# Patient Record
Sex: FEMALE | Race: WHITE | NOT HISPANIC OR LATINO | Employment: OTHER | URBAN - METROPOLITAN AREA
[De-identification: names, ages, dates, MRNs, and addresses within clinical notes are randomized per-mention and may not be internally consistent; named-entity substitution may affect disease eponyms.]

---

## 2018-05-21 LAB
ALBUMIN (HISTORICAL): 4.3 MG/DL
ALBUMIN SERPL BCP-MCNC: 4.4 G/DL (ref 3.5–5.7)
ALP SERPL-CCNC: 49 IU/L (ref 55–165)
ALT SERPL W P-5'-P-CCNC: 11 IU/L (ref 9–28)
ANION GAP SERPL CALCULATED.3IONS-SCNC: 14 MM/L
AST SERPL W P-5'-P-CCNC: 17 U/L (ref 7–26)
BASOPHILS # BLD AUTO: 0.1 X3/UL (ref 0–0.3)
BASOPHILS # BLD AUTO: 0.6 % (ref 0–2)
BILIRUB SERPL-MCNC: 0.4 MG/DL (ref 0.3–1)
BILIRUB UR QL STRIP: NEGATIVE
BUN SERPL-MCNC: 15 MG/DL (ref 7–25)
CALCIUM SERPL-MCNC: 10 MG/DL (ref 8.6–10.5)
CHLORIDE SERPL-SCNC: 95 MM/L (ref 98–107)
CHOLEST SERPL-MCNC: 206 MG/DL (ref 0–200)
CLARITY UR: CLEAR
CO2 SERPL-SCNC: 33 MM/L (ref 21–31)
COLOR UR: YELLOW
CREAT SERPL-MCNC: 0.62 MG/DL (ref 0.6–1.2)
DEPRECATED RDW RBC AUTO: 13.3 % (ref 11.5–14.5)
EGFR (HISTORICAL): > 60 GFR
EGFR AFRICAN AMERICAN (HISTORICAL): > 60 GFR
EOSINOPHIL # BLD AUTO: 0.4 X3/UL (ref 0–0.5)
EOSINOPHIL NFR BLD AUTO: 4.7 % (ref 0–5)
EST. AVERAGE GLUCOSE BLD GHB EST-MCNC: 138 MG/DL
GLUCOSE (HISTORICAL): 135 MG/DL (ref 65–99)
GLUCOSE UR STRIP-MCNC: NEGATIVE MG/DL
HBA1C MFR BLD HPLC: 6.4 % (ref 4–6.2)
HCT VFR BLD AUTO: 50.1 % (ref 37–47)
HDLC SERPL-MCNC: 48 MG/DL (ref 40–60)
HGB BLD-MCNC: 16.8 G/DL (ref 12–16)
HGB UR QL STRIP.AUTO: NEGATIVE
KETONES UR STRIP-MCNC: NEGATIVE MG/DL
LDLC SERPL CALC-MCNC: 116.3 MG/DL (ref 75–193)
LEUKOCYTE ESTERASE UR QL STRIP: NEGATIVE
LYMPHOCYTES # BLD AUTO: 2.7 X3/UL (ref 1.2–4.2)
LYMPHOCYTES NFR BLD AUTO: 30.4 % (ref 20.5–51.1)
MCH RBC QN AUTO: 31.3 PG (ref 26–34)
MCHC RBC AUTO-ENTMCNC: 33.5 G/DL (ref 31–36)
MCV RBC AUTO: 93.3 FL (ref 81–99)
MONOCYTES # BLD AUTO: 0.5 X3/UL (ref 0–1)
MONOCYTES NFR BLD AUTO: 5.3 % (ref 1.7–12)
NEUTROPHILS # BLD AUTO: 5.3 X3/UL (ref 1.4–6.5)
NEUTS SEG NFR BLD AUTO: 59 % (ref 42.2–75.2)
NITRITE UR QL STRIP: NEGATIVE
OSMOLALITY, SERUM (HISTORICAL): 279 MOSM (ref 262–291)
PH UR STRIP.AUTO: 6 [PH] (ref 4.5–8)
PLATELET # BLD AUTO: 264 X3/UL (ref 130–400)
PMV BLD AUTO: 8.8 FL (ref 8.6–11.7)
POTASSIUM SERPL-SCNC: 4 MM/L (ref 3.5–5.5)
PROT UR STRIP-MCNC: NEGATIVE MG/DL
RBC # BLD AUTO: 5.37 X6/UL (ref 3.9–5.2)
SODIUM SERPL-SCNC: 138 MM/L (ref 134–143)
SP GR UR STRIP.AUTO: 1.01 (ref 1–1.03)
TOTAL PROTEIN (HISTORICAL): 6.4 G/DL (ref 6.4–8.9)
TRIGL SERPL-MCNC: 210 MG/DL (ref 44–166)
TSH SERPL DL<=0.05 MIU/L-ACNC: 2.42 UIU/M (ref 0.45–5.33)
UROBILINOGEN UR QL STRIP.AUTO: 0.2 EU/DL (ref 0.2–8)
VLDL CHOLESTEROL (HISTORICAL): 42 MG/DL (ref 5–51)
WBC # BLD AUTO: 9 X3/UL (ref 4.8–10.8)

## 2018-07-18 ENCOUNTER — TRANSCRIBE ORDERS (OUTPATIENT)
Dept: ADMINISTRATIVE | Facility: HOSPITAL | Age: 73
End: 2018-07-18

## 2018-07-18 ENCOUNTER — APPOINTMENT (OUTPATIENT)
Dept: LAB | Facility: CLINIC | Age: 73
End: 2018-07-18
Payer: MEDICARE

## 2018-07-18 DIAGNOSIS — K21.9 GASTROESOPHAGEAL REFLUX DISEASE WITHOUT ESOPHAGITIS: ICD-10-CM

## 2018-07-18 DIAGNOSIS — I10 ESSENTIAL HYPERTENSION, MALIGNANT: ICD-10-CM

## 2018-07-18 DIAGNOSIS — E11.9 DIABETES MELLITUS WITHOUT COMPLICATION (HCC): ICD-10-CM

## 2018-07-18 DIAGNOSIS — G25.81 RESTLESS LEGS: ICD-10-CM

## 2018-07-18 DIAGNOSIS — K11.6 MUCOCELE OF SALIVARY GLAND: Primary | ICD-10-CM

## 2018-07-18 DIAGNOSIS — K11.6 MUCOCELE OF SALIVARY GLAND: ICD-10-CM

## 2018-07-18 LAB
ALBUMIN SERPL BCP-MCNC: 4 G/DL (ref 3.5–5)
ALP SERPL-CCNC: 52 U/L (ref 46–116)
ALT SERPL W P-5'-P-CCNC: 20 U/L (ref 12–78)
ANION GAP SERPL CALCULATED.3IONS-SCNC: 9 MMOL/L (ref 4–13)
AST SERPL W P-5'-P-CCNC: 20 U/L (ref 5–45)
BASOPHILS # BLD AUTO: 0.05 THOUSANDS/ΜL (ref 0–0.1)
BASOPHILS NFR BLD AUTO: 1 % (ref 0–1)
BILIRUB SERPL-MCNC: 0.51 MG/DL (ref 0.2–1)
BUN SERPL-MCNC: 9 MG/DL (ref 5–25)
CALCIUM SERPL-MCNC: 9.9 MG/DL (ref 8.3–10.1)
CHLORIDE SERPL-SCNC: 100 MMOL/L (ref 100–108)
CHOLEST SERPL-MCNC: 180 MG/DL (ref 50–200)
CO2 SERPL-SCNC: 30 MMOL/L (ref 21–32)
CREAT SERPL-MCNC: 0.63 MG/DL (ref 0.6–1.3)
EOSINOPHIL # BLD AUTO: 0.39 THOUSAND/ΜL (ref 0–0.61)
EOSINOPHIL NFR BLD AUTO: 5 % (ref 0–6)
ERYTHROCYTE [DISTWIDTH] IN BLOOD BY AUTOMATED COUNT: 13.2 % (ref 11.6–15.1)
GFR SERPL CREATININE-BSD FRML MDRD: 91 ML/MIN/1.73SQ M
GLUCOSE P FAST SERPL-MCNC: 125 MG/DL (ref 65–99)
HCT VFR BLD AUTO: 49.2 % (ref 34.8–46.1)
HDLC SERPL-MCNC: 47 MG/DL (ref 40–60)
HGB BLD-MCNC: 16.4 G/DL (ref 11.5–15.4)
IMM GRANULOCYTES # BLD AUTO: 0.02 THOUSAND/UL (ref 0–0.2)
IMM GRANULOCYTES NFR BLD AUTO: 0 % (ref 0–2)
LDLC SERPL CALC-MCNC: 91 MG/DL (ref 0–100)
LYMPHOCYTES # BLD AUTO: 2.91 THOUSANDS/ΜL (ref 0.6–4.47)
LYMPHOCYTES NFR BLD AUTO: 35 % (ref 14–44)
MCH RBC QN AUTO: 31.6 PG (ref 26.8–34.3)
MCHC RBC AUTO-ENTMCNC: 33.3 G/DL (ref 31.4–37.4)
MCV RBC AUTO: 95 FL (ref 82–98)
MONOCYTES # BLD AUTO: 0.54 THOUSAND/ΜL (ref 0.17–1.22)
MONOCYTES NFR BLD AUTO: 7 % (ref 4–12)
NEUTROPHILS # BLD AUTO: 4.32 THOUSANDS/ΜL (ref 1.85–7.62)
NEUTS SEG NFR BLD AUTO: 52 % (ref 43–75)
NONHDLC SERPL-MCNC: 133 MG/DL
NRBC BLD AUTO-RTO: 0 /100 WBCS
PLATELET # BLD AUTO: 250 THOUSANDS/UL (ref 149–390)
PMV BLD AUTO: 10.6 FL (ref 8.9–12.7)
POTASSIUM SERPL-SCNC: 3.7 MMOL/L (ref 3.5–5.3)
PROT SERPL-MCNC: 6.9 G/DL (ref 6.4–8.2)
RBC # BLD AUTO: 5.19 MILLION/UL (ref 3.81–5.12)
SODIUM SERPL-SCNC: 139 MMOL/L (ref 136–145)
TRIGL SERPL-MCNC: 212 MG/DL
WBC # BLD AUTO: 8.23 THOUSAND/UL (ref 4.31–10.16)

## 2018-07-18 PROCEDURE — 85025 COMPLETE CBC W/AUTO DIFF WBC: CPT

## 2018-07-18 PROCEDURE — 36415 COLL VENOUS BLD VENIPUNCTURE: CPT

## 2018-07-18 PROCEDURE — 80053 COMPREHEN METABOLIC PANEL: CPT

## 2018-07-18 PROCEDURE — 80061 LIPID PANEL: CPT

## 2018-10-15 ENCOUNTER — TRANSCRIBE ORDERS (OUTPATIENT)
Dept: ADMINISTRATIVE | Facility: HOSPITAL | Age: 73
End: 2018-10-15

## 2018-10-15 ENCOUNTER — APPOINTMENT (OUTPATIENT)
Dept: LAB | Facility: CLINIC | Age: 73
End: 2018-10-15
Payer: MEDICARE

## 2018-10-15 DIAGNOSIS — F41.9 ANXIETY HYPERVENTILATION: ICD-10-CM

## 2018-10-15 DIAGNOSIS — K21.9 GASTROESOPHAGEAL REFLUX DISEASE WITHOUT ESOPHAGITIS: ICD-10-CM

## 2018-10-15 DIAGNOSIS — F41.9 ANXIETY HYPERVENTILATION: Primary | ICD-10-CM

## 2018-10-15 DIAGNOSIS — L98.9 FIBROHISTIOCYTIC PROLIFERATION OF THE SKIN: ICD-10-CM

## 2018-10-15 DIAGNOSIS — E11.8 TYPE 2 DIABETES MELLITUS WITH COMPLICATION, UNSPECIFIED WHETHER LONG TERM INSULIN USE: ICD-10-CM

## 2018-10-15 DIAGNOSIS — K11.6 MUCOCELE OF SALIVARY GLAND: ICD-10-CM

## 2018-10-15 DIAGNOSIS — I10 ESSENTIAL HYPERTENSION, MALIGNANT: ICD-10-CM

## 2018-10-15 DIAGNOSIS — F45.8 ANXIETY HYPERVENTILATION: Primary | ICD-10-CM

## 2018-10-15 DIAGNOSIS — F45.8 ANXIETY HYPERVENTILATION: ICD-10-CM

## 2018-10-15 LAB
ALBUMIN SERPL BCP-MCNC: 4.1 G/DL (ref 3.5–5)
ALP SERPL-CCNC: 56 U/L (ref 46–116)
ALT SERPL W P-5'-P-CCNC: 17 U/L (ref 12–78)
ANION GAP SERPL CALCULATED.3IONS-SCNC: 5 MMOL/L (ref 4–13)
AST SERPL W P-5'-P-CCNC: 26 U/L (ref 5–45)
BASOPHILS # BLD AUTO: 0.05 THOUSANDS/ΜL (ref 0–0.1)
BASOPHILS NFR BLD AUTO: 1 % (ref 0–1)
BILIRUB SERPL-MCNC: 0.5 MG/DL (ref 0.2–1)
BUN SERPL-MCNC: 13 MG/DL (ref 5–25)
CALCIUM SERPL-MCNC: 9.6 MG/DL (ref 8.3–10.1)
CHLORIDE SERPL-SCNC: 100 MMOL/L (ref 100–108)
CHOLEST SERPL-MCNC: 180 MG/DL (ref 50–200)
CO2 SERPL-SCNC: 28 MMOL/L (ref 21–32)
CREAT SERPL-MCNC: 0.62 MG/DL (ref 0.6–1.3)
EOSINOPHIL # BLD AUTO: 0.29 THOUSAND/ΜL (ref 0–0.61)
EOSINOPHIL NFR BLD AUTO: 3 % (ref 0–6)
ERYTHROCYTE [DISTWIDTH] IN BLOOD BY AUTOMATED COUNT: 13.2 % (ref 11.6–15.1)
EST. AVERAGE GLUCOSE BLD GHB EST-MCNC: 131 MG/DL
GFR SERPL CREATININE-BSD FRML MDRD: 90 ML/MIN/1.73SQ M
GLUCOSE P FAST SERPL-MCNC: 129 MG/DL (ref 65–99)
HBA1C MFR BLD: 6.2 % (ref 4.2–6.3)
HCT VFR BLD AUTO: 51.1 % (ref 34.8–46.1)
HDLC SERPL-MCNC: 50 MG/DL (ref 40–60)
HGB BLD-MCNC: 17.1 G/DL (ref 11.5–15.4)
IMM GRANULOCYTES # BLD AUTO: 0.02 THOUSAND/UL (ref 0–0.2)
IMM GRANULOCYTES NFR BLD AUTO: 0 % (ref 0–2)
LDLC SERPL CALC-MCNC: 87 MG/DL (ref 0–100)
LYMPHOCYTES # BLD AUTO: 2.37 THOUSANDS/ΜL (ref 0.6–4.47)
LYMPHOCYTES NFR BLD AUTO: 28 % (ref 14–44)
MCH RBC QN AUTO: 31.5 PG (ref 26.8–34.3)
MCHC RBC AUTO-ENTMCNC: 33.5 G/DL (ref 31.4–37.4)
MCV RBC AUTO: 94 FL (ref 82–98)
MONOCYTES # BLD AUTO: 0.46 THOUSAND/ΜL (ref 0.17–1.22)
MONOCYTES NFR BLD AUTO: 5 % (ref 4–12)
NEUTROPHILS # BLD AUTO: 5.33 THOUSANDS/ΜL (ref 1.85–7.62)
NEUTS SEG NFR BLD AUTO: 63 % (ref 43–75)
NONHDLC SERPL-MCNC: 130 MG/DL
NRBC BLD AUTO-RTO: 0 /100 WBCS
PLATELET # BLD AUTO: 272 THOUSANDS/UL (ref 149–390)
PMV BLD AUTO: 10.3 FL (ref 8.9–12.7)
POTASSIUM SERPL-SCNC: 4.4 MMOL/L (ref 3.5–5.3)
PROT SERPL-MCNC: 7.2 G/DL (ref 6.4–8.2)
RBC # BLD AUTO: 5.43 MILLION/UL (ref 3.81–5.12)
SODIUM SERPL-SCNC: 133 MMOL/L (ref 136–145)
TRIGL SERPL-MCNC: 215 MG/DL
WBC # BLD AUTO: 8.52 THOUSAND/UL (ref 4.31–10.16)

## 2018-10-15 PROCEDURE — 80053 COMPREHEN METABOLIC PANEL: CPT

## 2018-10-15 PROCEDURE — 80061 LIPID PANEL: CPT

## 2018-10-15 PROCEDURE — 83036 HEMOGLOBIN GLYCOSYLATED A1C: CPT

## 2018-10-15 PROCEDURE — 36415 COLL VENOUS BLD VENIPUNCTURE: CPT

## 2018-10-15 PROCEDURE — 85025 COMPLETE CBC W/AUTO DIFF WBC: CPT

## 2018-11-27 DIAGNOSIS — E11.65 UNCONTROLLED TYPE 2 DIABETES MELLITUS WITH HYPERGLYCEMIA (HCC): Primary | ICD-10-CM

## 2018-11-27 RX ORDER — METFORMIN HYDROCHLORIDE 500 MG/1
1000 TABLET, EXTENDED RELEASE ORAL
COMMUNITY
Start: 2014-01-14 | End: 2018-11-27 | Stop reason: SDUPTHER

## 2018-11-27 RX ORDER — METFORMIN HYDROCHLORIDE 500 MG/1
1000 TABLET, EXTENDED RELEASE ORAL 2 TIMES DAILY WITH MEALS
Qty: 360 TABLET | Refills: 1 | Status: SHIPPED | OUTPATIENT
Start: 2018-11-27 | End: 2019-06-28 | Stop reason: SDUPTHER

## 2018-12-06 DIAGNOSIS — I10 ESSENTIAL HYPERTENSION: Primary | ICD-10-CM

## 2018-12-06 DIAGNOSIS — I10 ESSENTIAL HYPERTENSION: ICD-10-CM

## 2018-12-06 RX ORDER — POTASSIUM CHLORIDE 750 MG/1
TABLET, FILM COATED, EXTENDED RELEASE ORAL
COMMUNITY
Start: 2014-01-14 | End: 2018-12-06 | Stop reason: SDUPTHER

## 2018-12-06 RX ORDER — POTASSIUM CHLORIDE 750 MG/1
TABLET, FILM COATED, EXTENDED RELEASE ORAL
Qty: 180 TABLET | Refills: 1 | Status: SHIPPED | OUTPATIENT
Start: 2018-12-06 | End: 2018-12-06 | Stop reason: SDUPTHER

## 2018-12-06 RX ORDER — POTASSIUM CHLORIDE 750 MG/1
TABLET, FILM COATED, EXTENDED RELEASE ORAL
Qty: 180 TABLET | Refills: 0 | Status: SHIPPED | OUTPATIENT
Start: 2018-12-06 | End: 2019-09-23

## 2019-01-03 DIAGNOSIS — F32.A ANXIETY AND DEPRESSION: Primary | ICD-10-CM

## 2019-01-03 DIAGNOSIS — F41.9 ANXIETY AND DEPRESSION: Primary | ICD-10-CM

## 2019-01-03 RX ORDER — BUPROPION HYDROCHLORIDE 300 MG/1
300 TABLET ORAL DAILY
Qty: 90 TABLET | Refills: 0 | Status: SHIPPED | OUTPATIENT
Start: 2019-01-03 | End: 2019-02-15 | Stop reason: SDUPTHER

## 2019-01-11 ENCOUNTER — TRANSCRIBE ORDERS (OUTPATIENT)
Dept: ADMINISTRATIVE | Facility: HOSPITAL | Age: 74
End: 2019-01-11

## 2019-01-11 ENCOUNTER — APPOINTMENT (OUTPATIENT)
Dept: LAB | Facility: CLINIC | Age: 74
End: 2019-01-11
Payer: MEDICARE

## 2019-01-11 DIAGNOSIS — IMO0002 DISORDER OF ROTATOR CUFF SYNDROME OF RIGHT SHOULDER AND ALLIED DISORDER: ICD-10-CM

## 2019-01-11 DIAGNOSIS — K58.0 IRRITABLE BOWEL SYNDROME WITH DIARRHEA: ICD-10-CM

## 2019-01-11 DIAGNOSIS — I10 HYPERTENSION, UNSPECIFIED TYPE: ICD-10-CM

## 2019-01-11 DIAGNOSIS — K59.00 CONSTIPATION, UNSPECIFIED CONSTIPATION TYPE: ICD-10-CM

## 2019-01-11 DIAGNOSIS — E11.9 TYPE 2 DIABETES MELLITUS WITHOUT COMPLICATION, UNSPECIFIED WHETHER LONG TERM INSULIN USE (HCC): ICD-10-CM

## 2019-01-11 DIAGNOSIS — G25.81 RESTLESS LEG SYNDROME: ICD-10-CM

## 2019-01-11 DIAGNOSIS — F41.9 ANXIETY: Primary | ICD-10-CM

## 2019-01-11 DIAGNOSIS — F41.9 ANXIETY: ICD-10-CM

## 2019-01-11 LAB
ALBUMIN SERPL BCP-MCNC: 4.3 G/DL (ref 3.5–5)
ALP SERPL-CCNC: 56 U/L (ref 46–116)
ALT SERPL W P-5'-P-CCNC: 16 U/L (ref 12–78)
ANION GAP SERPL CALCULATED.3IONS-SCNC: 12 MMOL/L (ref 4–13)
AST SERPL W P-5'-P-CCNC: 16 U/L (ref 5–45)
BILIRUB SERPL-MCNC: 0.47 MG/DL (ref 0.2–1)
BUN SERPL-MCNC: 11 MG/DL (ref 5–25)
CALCIUM SERPL-MCNC: 9.9 MG/DL (ref 8.3–10.1)
CHLORIDE SERPL-SCNC: 96 MMOL/L (ref 100–108)
CHOLEST SERPL-MCNC: 184 MG/DL (ref 50–200)
CO2 SERPL-SCNC: 27 MMOL/L (ref 21–32)
CREAT SERPL-MCNC: 0.62 MG/DL (ref 0.6–1.3)
EST. AVERAGE GLUCOSE BLD GHB EST-MCNC: 140 MG/DL
GFR SERPL CREATININE-BSD FRML MDRD: 90 ML/MIN/1.73SQ M
GLUCOSE P FAST SERPL-MCNC: 121 MG/DL (ref 65–99)
HBA1C MFR BLD: 6.5 % (ref 4.2–6.3)
HDLC SERPL-MCNC: 49 MG/DL (ref 40–60)
LDLC SERPL CALC-MCNC: 99 MG/DL (ref 0–100)
NONHDLC SERPL-MCNC: 135 MG/DL
POTASSIUM SERPL-SCNC: 3.6 MMOL/L (ref 3.5–5.3)
PROT SERPL-MCNC: 7 G/DL (ref 6.4–8.2)
SODIUM SERPL-SCNC: 135 MMOL/L (ref 136–145)
TRIGL SERPL-MCNC: 180 MG/DL

## 2019-01-11 PROCEDURE — 80053 COMPREHEN METABOLIC PANEL: CPT

## 2019-01-11 PROCEDURE — 80061 LIPID PANEL: CPT

## 2019-01-11 PROCEDURE — 83036 HEMOGLOBIN GLYCOSYLATED A1C: CPT

## 2019-01-11 PROCEDURE — 36415 COLL VENOUS BLD VENIPUNCTURE: CPT

## 2019-02-12 PROBLEM — E11.42 TYPE 2 DIABETES MELLITUS WITH DIABETIC POLYNEUROPATHY (HCC): Status: ACTIVE | Noted: 2019-02-12

## 2019-02-15 ENCOUNTER — OFFICE VISIT (OUTPATIENT)
Dept: FAMILY MEDICINE CLINIC | Facility: CLINIC | Age: 74
End: 2019-02-15
Payer: MEDICARE

## 2019-02-15 VITALS
HEART RATE: 81 BPM | BODY MASS INDEX: 32.21 KG/M2 | HEIGHT: 61 IN | DIASTOLIC BLOOD PRESSURE: 72 MMHG | OXYGEN SATURATION: 87 % | SYSTOLIC BLOOD PRESSURE: 138 MMHG | WEIGHT: 170.6 LBS

## 2019-02-15 DIAGNOSIS — F41.9 ANXIETY AND DEPRESSION: ICD-10-CM

## 2019-02-15 DIAGNOSIS — F32.A ANXIETY AND DEPRESSION: ICD-10-CM

## 2019-02-15 DIAGNOSIS — E11.42 TYPE 2 DIABETES MELLITUS WITH DIABETIC POLYNEUROPATHY, WITHOUT LONG-TERM CURRENT USE OF INSULIN (HCC): Primary | ICD-10-CM

## 2019-02-15 DIAGNOSIS — I10 ESSENTIAL HYPERTENSION: ICD-10-CM

## 2019-02-15 PROCEDURE — 99214 OFFICE O/P EST MOD 30 MIN: CPT | Performed by: FAMILY MEDICINE

## 2019-02-15 RX ORDER — LANOLIN ALCOHOL/MO/W.PET/CERES
500 CREAM (GRAM) TOPICAL
COMMUNITY

## 2019-02-15 RX ORDER — CLONAZEPAM 1 MG/1
0.5 TABLET ORAL 2 TIMES DAILY
COMMUNITY
End: 2019-04-02 | Stop reason: SDUPTHER

## 2019-02-15 RX ORDER — HYDROCHLOROTHIAZIDE 50 MG/1
TABLET ORAL
COMMUNITY
Start: 2014-01-14 | End: 2019-09-07 | Stop reason: SDUPTHER

## 2019-02-15 RX ORDER — SITAGLIPTIN 100 MG/1
TABLET, FILM COATED ORAL
COMMUNITY
Start: 2019-02-13 | End: 2019-06-04 | Stop reason: SDUPTHER

## 2019-02-15 RX ORDER — BUPROPION HYDROCHLORIDE 300 MG/1
300 TABLET ORAL DAILY
Qty: 90 TABLET | Refills: 0 | Status: SHIPPED | OUTPATIENT
Start: 2019-02-15 | End: 2019-10-08 | Stop reason: SDUPTHER

## 2019-02-15 RX ORDER — NAPROXEN SODIUM 220 MG
220 TABLET ORAL
COMMUNITY

## 2019-02-15 RX ORDER — GABAPENTIN 400 MG/1
800 CAPSULE ORAL
COMMUNITY
Start: 2018-08-27 | End: 2020-01-17 | Stop reason: ALTCHOICE

## 2019-02-15 RX ORDER — PANTOPRAZOLE SODIUM 40 MG/1
TABLET, DELAYED RELEASE ORAL
COMMUNITY
Start: 2019-01-02 | End: 2020-01-17 | Stop reason: ALTCHOICE

## 2019-02-15 RX ORDER — CEPHALEXIN 500 MG/1
500 CAPSULE ORAL EVERY 6 HOURS SCHEDULED
COMMUNITY
End: 2019-09-23

## 2019-02-15 NOTE — PATIENT INSTRUCTIONS
10% - bad control"> 10% - bad control,Hemoglobin A1c (HbA1c) greater than 10% indicating poor diabetic control,Haemoglobin A1c greater than 10% indicating poor diabetic control">   Diabetes Mellitus Type 2 in Adults, Ambulatory Care   GENERAL INFORMATION:   Diabetes mellitus type 2  is a disease that affects how your body uses glucose (sugar)  Insulin helps move sugar out of the blood so it can be used for energy  Normally, when the blood sugar level increases, the pancreas makes more insulin  Type 2 diabetes develops because either the body cannot make enough insulin, or it cannot use the insulin correctly  After many years, your pancreas may stop making insulin  Common symptoms include the following:   · More hunger or thirst than usual     · Frequent urination     · Weight loss without trying     · Blurred vision  Seek immediate care for the following symptoms:   · Severe abdominal pain, or pain that spreads to your back  You may also be vomiting  · Trouble staying awake or focusing    · Shaking or sweating    · Blurred or double vision    · Breath has a fruity, sweet smell    · Breathing is deep and labored, or rapid and shallow    · Heartbeat is fast and weak  Treatment for diabetes mellitus type 2  includes keeping your blood sugar at a normal level  You must eat the right foods, and exercise regularly  You may also need medicine if you cannot control your blood sugar level with nutrition and exercise  Manage diabetes mellitus type 2:   · Check your blood sugar level  You will be taught how to check a small drop of blood in a glucose monitor  Ask your healthcare provider when and how often to check during the day  Ask your healthcare provider what your blood sugar levels should be when you check them  · Keep track of carbohydrates (sugar and starchy foods)  Your blood sugar level can get too high if you eat too many carbohydrates   Your dietitian will help you plan meals and snacks that have the right amount of carbohydrates  · Eat low-fat foods  Some examples are skinless chicken and low-fat milk  · Eat less sodium (salt)  Some examples of high-sodium foods to limit are soy sauce, potato chips, and soup  Do not add salt to food you cook  Limit your use of table salt  · Eat high-fiber foods  Foods that are a good source of fiber include vegetables, whole grain bread, and beans  · Limit alcohol  Alcohol affects your blood sugar level and can make it harder to manage your diabetes  Women should limit alcohol to 1 drink a day  Men should limit alcohol to 2 drinks a day  A drink of alcohol is 12 ounces of beer, 5 ounces of wine, or 1½ ounces of liquor  · Get regular exercise  Exercise can help keep your blood sugar level steady, decrease your risk of heart disease, and help you lose weight  Exercise for at least 30 minutes, 5 days a week  Include muscle strengthening activities 2 days each week  Work with your healthcare provider to create an exercise plan  · Check your feet each day  for injuries or open sores  Ask your healthcare provider for activities you can do if you have an open sore  · Quit smoking  If you smoke, it is never too late to quit  Smoking can worsen the problems that may occur with diabetes  Ask your healthcare provider for information about how to stop smoking if you are having trouble quitting  · Ask about your weight:  Ask healthcare providers if you need to lose weight, and how much to lose  Ask them to help you with a weight loss program  Even a 10 to 15 pound weight loss can help you manage your blood sugar level  · Carry medical alert identification  Wear medical alert jewelry or carry a card that says you have diabetes  Ask your healthcare provider where to get these items  · Ask about vaccines  Diabetes puts you at risk of serious illness if you get the flu, pneumonia, or hepatitis   Ask your healthcare provider if you should get a flu, pneumonia, or hepatitis B vaccine, and when to get the vaccine  Follow up with your healthcare provider as directed:  Write down your questions so you remember to ask them during your visits  CARE AGREEMENT:   You have the right to help plan your care  Learn about your health condition and how it may be treated  Discuss treatment options with your caregivers to decide what care you want to receive  You always have the right to refuse treatment  The above information is an  only  It is not intended as medical advice for individual conditions or treatments  Talk to your doctor, nurse or pharmacist before following any medical regimen to see if it is safe and effective for you  © 2014 [address] is for End User's use only and may not be sold, redistributed or otherwise used for commercial purposes  All illustrations and images included in CareNotes® are the copyrighted property of A D A M [employer]  or Nicola Hernández

## 2019-02-15 NOTE — ASSESSMENT & PLAN NOTE
Patient is relatively stable on her current medications this time continue bupropion for anxiety and generalized mood changes she has been stable sleeping well appetite is good continue medicine in no change at this time re-evaluate in 3 months

## 2019-02-15 NOTE — ASSESSMENT & PLAN NOTE
Hypertension stable at this time continue on current medication hydrochlorothiazide    Re-evaluate lab work in 3 months avoid sodium in the diet continue with exercise and maintain weight management

## 2019-02-15 NOTE — ASSESSMENT & PLAN NOTE
Lab Results   Component Value Date    HGBA1C 6 5 (H) 01/11/2019       No results for input(s): POCGLU in the last 72 hours  Blood Sugar Average: Last 72 hrs:   patient's last A1c was at 6 5 she needs to watch her diet and carbohydrate intake avoid significant concentrated sweets and exercise as much as she can when the weather starts to improve  I will re-evaluate her blood work in the spring time    Continue current medications and diet at this time

## 2019-02-15 NOTE — PROGRESS NOTES
Assessment/Plan:       Problem List Items Addressed This Visit        Endocrine    Type 2 diabetes mellitus with diabetic polyneuropathy ([facility] [address] ) - Primary     Lab Results   Component Value Date    HGBA1C 6 5 (H) 01/11/2019       No results for input(s): POCGLU in the last 72 hours  Blood Sugar Average: Last 72 hrs:   patient's last A1c was at 6 5 she needs to watch her diet and carbohydrate intake avoid significant concentrated sweets and exercise as much as she can when the weather starts to improve  I will re-evaluate her blood work in the spring time  Continue current medications and diet at this time         Relevant Medications    JANUVIA 100 MG tablet       Cardiovascular and Mediastinum    Essential hypertension     Hypertension stable at this time continue on current medication hydrochlorothiazide  Re-evaluate lab work in 3 months avoid sodium in the diet continue with exercise and maintain weight management         Relevant Medications    hydrochlorothiazide (HYDRODIURIL) 50 mg tablet       Other    Anxiety and depression     Patient is relatively stable on her current medications this time continue bupropion for anxiety and generalized mood changes she has been stable sleeping well appetite is good continue medicine in no change at this time re-evaluate in 3 months                 Subjective:      Patient ID: Jeffry Vera is a 68 y o  female  Patient is in the office today for general follow-up care review of medications and lab work she has been doing relatively well  I will reassess an A1c level on her diabetes at the next office visit along with CMP and full labs    She is instructed to watch her diet lose weight exercise more frequently and avoid carbohydrates      The following portions of the patient's history were reviewed and updated as appropriate: allergies, current medications, past family history, past medical history, past social history, past surgical history and problem list     Review of Systems   Constitutional: Negative for chills, fatigue and fever  HENT: Negative for congestion, nosebleeds, rhinorrhea, sinus pressure and sore throat  Eyes: Negative for discharge and redness  Respiratory: Negative for cough and shortness of breath  Cardiovascular: Negative for chest pain, palpitations and leg swelling  Gastrointestinal: Positive for constipation  Negative for abdominal pain, blood in stool and nausea  Endocrine: Negative for cold intolerance, heat intolerance and polyuria  Genitourinary: Negative for dysuria and frequency  Musculoskeletal: Positive for arthralgias  Negative for back pain and myalgias  Skin: Negative for rash  Neurological: Positive for headaches  Negative for dizziness and weakness  Hematological: Negative for adenopathy  Psychiatric/Behavioral: Negative for behavioral problems and sleep disturbance  The patient is not nervous/anxious  Objective:      /72 (BP Location: Left arm, Patient Position: Sitting)   Pulse 81   Ht 5' 1" (1 549 m)   Wt 77 4 kg (170 lb 9 6 oz)   SpO2 (!) 87%   BMI 32 23 kg/m²        Physical Exam   Constitutional: She is oriented to person, place, and time  She appears well-developed and well-nourished  No distress  HENT:   Head: Normocephalic and atraumatic  Right Ear: External ear normal    Left Ear: External ear normal    Nose: Nose normal    Mouth/Throat: Oropharynx is clear and moist  No oropharyngeal exudate  Eyes: Pupils are equal, round, and reactive to light  Conjunctivae and EOM are normal  Right eye exhibits no discharge  Left eye exhibits no discharge  No scleral icterus  Neck: Normal range of motion  No JVD present  No thyromegaly present  Cardiovascular: Normal rate, regular rhythm and normal heart sounds  No murmur heard  Pulmonary/Chest: Effort normal  She has no wheezes  She has no rales  She exhibits no tenderness  Abdominal: Soft   Bowel sounds are normal  She exhibits no distension and no mass  There is no tenderness  Musculoskeletal: Normal range of motion  She exhibits no edema, tenderness or deformity  Lymphadenopathy:     She has no cervical adenopathy  Neurological: She is alert and oriented to person, place, and time  She has normal reflexes  She displays normal reflexes  No cranial nerve deficit  Coordination normal    Skin: Skin is warm and dry  No rash noted  Psychiatric: She has a normal mood and affect  Her behavior is normal  Judgment and thought content normal    Nursing note and vitals reviewed  Data:    Laboratory Results: I have personally reviewed the pertinent laboratory results/reports   Radiology/Other Diagnostic Testing Results: I have personally reviewed pertinent reports         Lab Results   Component Value Date    WBC 8 52 10/15/2018    HGB 17 1 (H) 10/15/2018    HCT 51 1 (H) 10/15/2018    MCV 94 10/15/2018     10/15/2018     Lab Results   Component Value Date     05/21/2018    K 3 6 01/11/2019    CL 96 (L) 01/11/2019    CO2 27 01/11/2019    ANIONGAP 14 0 05/21/2018    BUN 11 01/11/2019    CREATININE 0 62 01/11/2019    GLUF 121 (H) 01/11/2019    CALCIUM 9 9 01/11/2019    AST 16 01/11/2019    ALT 16 01/11/2019    ALKPHOS 56 01/11/2019    PROT 6 4 05/21/2018    BILITOT 0 4 05/21/2018    EGFR 90 01/11/2019     Lab Results   Component Value Date    CHOLESTEROL 184 01/11/2019    CHOLESTEROL 180 10/15/2018    CHOLESTEROL 180 07/18/2018     Lab Results   Component Value Date    HDL 49 01/11/2019    HDL 50 10/15/2018    HDL 47 07/18/2018     Lab Results   Component Value Date    LDLCALC 99 01/11/2019    LDLCALC 87 10/15/2018    LDLCALC 91 07/18/2018     Lab Results   Component Value Date    TRIG 180 (H) 01/11/2019    TRIG 215 (H) 10/15/2018    TRIG 212 (H) 07/18/2018     No results found for: [place], [place]  Lab Results   Component Value Date    BPO1UBCRGFER 2 42 05/21/2018     Lab Results   Component Value Date    HGBA1C 6 5 (H) 01/11/2019     No results found for: DOE Jaramillo, DO

## 2019-03-15 ENCOUNTER — TELEPHONE (OUTPATIENT)
Dept: FAMILY MEDICINE CLINIC | Facility: CLINIC | Age: 74
End: 2019-03-15

## 2019-03-15 DIAGNOSIS — E11.42 TYPE 2 DIABETES MELLITUS WITH DIABETIC POLYNEUROPATHY, WITHOUT LONG-TERM CURRENT USE OF INSULIN (HCC): Primary | ICD-10-CM

## 2019-03-15 RX ORDER — CIPROFLOXACIN 500 MG/1
500 TABLET, FILM COATED ORAL EVERY 12 HOURS SCHEDULED
Qty: 20 TABLET | Refills: 0 | Status: SHIPPED | OUTPATIENT
Start: 2019-03-15 | End: 2019-03-25

## 2019-03-15 NOTE — TELEPHONE ENCOUNTER
Pt called and said that she has the red spots came back and she has an appt next month she would like and antibotic for it

## 2019-04-02 DIAGNOSIS — F32.A ANXIETY AND DEPRESSION: Primary | ICD-10-CM

## 2019-04-02 DIAGNOSIS — F41.9 ANXIETY AND DEPRESSION: Primary | ICD-10-CM

## 2019-04-02 RX ORDER — CLONAZEPAM 1 MG/1
TABLET ORAL
Qty: 30 TABLET | Refills: 2 | Status: SHIPPED | OUTPATIENT
Start: 2019-04-02 | End: 2019-08-07 | Stop reason: SDUPTHER

## 2019-04-16 DIAGNOSIS — F41.9 ANXIETY AND DEPRESSION: ICD-10-CM

## 2019-04-16 DIAGNOSIS — F32.A ANXIETY AND DEPRESSION: ICD-10-CM

## 2019-04-16 RX ORDER — BUPROPION HYDROCHLORIDE 300 MG/1
TABLET ORAL
Qty: 90 TABLET | Refills: 1 | Status: SHIPPED | OUTPATIENT
Start: 2019-04-16 | End: 2019-09-23

## 2019-05-07 ENCOUNTER — APPOINTMENT (OUTPATIENT)
Dept: LAB | Facility: CLINIC | Age: 74
End: 2019-05-07
Payer: MEDICARE

## 2019-05-07 DIAGNOSIS — I10 ESSENTIAL HYPERTENSION: ICD-10-CM

## 2019-05-07 DIAGNOSIS — E11.42 TYPE 2 DIABETES MELLITUS WITH DIABETIC POLYNEUROPATHY, WITHOUT LONG-TERM CURRENT USE OF INSULIN (HCC): ICD-10-CM

## 2019-05-07 LAB
ALBUMIN SERPL BCP-MCNC: 4.1 G/DL (ref 3.5–5)
ALP SERPL-CCNC: 52 U/L (ref 46–116)
ALT SERPL W P-5'-P-CCNC: 19 U/L (ref 12–78)
ANION GAP SERPL CALCULATED.3IONS-SCNC: 6 MMOL/L (ref 4–13)
AST SERPL W P-5'-P-CCNC: 15 U/L (ref 5–45)
BASOPHILS # BLD AUTO: 0.06 THOUSANDS/ΜL (ref 0–0.1)
BASOPHILS NFR BLD AUTO: 1 % (ref 0–1)
BILIRUB SERPL-MCNC: 0.36 MG/DL (ref 0.2–1)
BUN SERPL-MCNC: 10 MG/DL (ref 5–25)
CALCIUM SERPL-MCNC: 9.5 MG/DL (ref 8.3–10.1)
CHLORIDE SERPL-SCNC: 98 MMOL/L (ref 100–108)
CHOLEST SERPL-MCNC: 164 MG/DL (ref 50–200)
CO2 SERPL-SCNC: 32 MMOL/L (ref 21–32)
CREAT SERPL-MCNC: 0.64 MG/DL (ref 0.6–1.3)
EOSINOPHIL # BLD AUTO: 0.31 THOUSAND/ΜL (ref 0–0.61)
EOSINOPHIL NFR BLD AUTO: 4 % (ref 0–6)
ERYTHROCYTE [DISTWIDTH] IN BLOOD BY AUTOMATED COUNT: 13.1 % (ref 11.6–15.1)
EST. AVERAGE GLUCOSE BLD GHB EST-MCNC: 131 MG/DL
GFR SERPL CREATININE-BSD FRML MDRD: 89 ML/MIN/1.73SQ M
GLUCOSE P FAST SERPL-MCNC: 110 MG/DL (ref 65–99)
HBA1C MFR BLD: 6.2 % (ref 4.2–6.3)
HCT VFR BLD AUTO: 49.2 % (ref 34.8–46.1)
HDLC SERPL-MCNC: 46 MG/DL (ref 40–60)
HGB BLD-MCNC: 15.9 G/DL (ref 11.5–15.4)
IMM GRANULOCYTES # BLD AUTO: 0.02 THOUSAND/UL (ref 0–0.2)
IMM GRANULOCYTES NFR BLD AUTO: 0 % (ref 0–2)
LDLC SERPL CALC-MCNC: 85 MG/DL (ref 0–100)
LYMPHOCYTES # BLD AUTO: 3.07 THOUSANDS/ΜL (ref 0.6–4.47)
LYMPHOCYTES NFR BLD AUTO: 36 % (ref 14–44)
MCH RBC QN AUTO: 30.9 PG (ref 26.8–34.3)
MCHC RBC AUTO-ENTMCNC: 32.3 G/DL (ref 31.4–37.4)
MCV RBC AUTO: 96 FL (ref 82–98)
MONOCYTES # BLD AUTO: 0.51 THOUSAND/ΜL (ref 0.17–1.22)
MONOCYTES NFR BLD AUTO: 6 % (ref 4–12)
NEUTROPHILS # BLD AUTO: 4.66 THOUSANDS/ΜL (ref 1.85–7.62)
NEUTS SEG NFR BLD AUTO: 53 % (ref 43–75)
NONHDLC SERPL-MCNC: 118 MG/DL
NRBC BLD AUTO-RTO: 0 /100 WBCS
PLATELET # BLD AUTO: 236 THOUSANDS/UL (ref 149–390)
PMV BLD AUTO: 10.3 FL (ref 8.9–12.7)
POTASSIUM SERPL-SCNC: 4.1 MMOL/L (ref 3.5–5.3)
PROT SERPL-MCNC: 6.7 G/DL (ref 6.4–8.2)
RBC # BLD AUTO: 5.15 MILLION/UL (ref 3.81–5.12)
SODIUM SERPL-SCNC: 136 MMOL/L (ref 136–145)
TRIGL SERPL-MCNC: 166 MG/DL
TSH SERPL DL<=0.05 MIU/L-ACNC: 2.63 UIU/ML (ref 0.36–3.74)
WBC # BLD AUTO: 8.63 THOUSAND/UL (ref 4.31–10.16)

## 2019-05-07 PROCEDURE — 83036 HEMOGLOBIN GLYCOSYLATED A1C: CPT

## 2019-05-07 PROCEDURE — 36415 COLL VENOUS BLD VENIPUNCTURE: CPT

## 2019-05-07 PROCEDURE — 85025 COMPLETE CBC W/AUTO DIFF WBC: CPT

## 2019-05-07 PROCEDURE — 80053 COMPREHEN METABOLIC PANEL: CPT

## 2019-05-07 PROCEDURE — 84443 ASSAY THYROID STIM HORMONE: CPT

## 2019-05-07 PROCEDURE — 80061 LIPID PANEL: CPT

## 2019-05-17 ENCOUNTER — OFFICE VISIT (OUTPATIENT)
Dept: FAMILY MEDICINE CLINIC | Facility: CLINIC | Age: 74
End: 2019-05-17
Payer: MEDICARE

## 2019-05-17 VITALS
BODY MASS INDEX: 32.7 KG/M2 | DIASTOLIC BLOOD PRESSURE: 80 MMHG | SYSTOLIC BLOOD PRESSURE: 140 MMHG | HEART RATE: 82 BPM | WEIGHT: 173.2 LBS | OXYGEN SATURATION: 92 % | TEMPERATURE: 99.2 F | HEIGHT: 61 IN

## 2019-05-17 DIAGNOSIS — Z11.59 ENCOUNTER FOR HEPATITIS C SCREENING TEST FOR LOW RISK PATIENT: ICD-10-CM

## 2019-05-17 DIAGNOSIS — F41.9 ANXIETY AND DEPRESSION: ICD-10-CM

## 2019-05-17 DIAGNOSIS — E11.42 TYPE 2 DIABETES MELLITUS WITH DIABETIC POLYNEUROPATHY, WITHOUT LONG-TERM CURRENT USE OF INSULIN (HCC): Primary | ICD-10-CM

## 2019-05-17 DIAGNOSIS — Z13.89 SCREENING FOR BLOOD OR PROTEIN IN URINE: ICD-10-CM

## 2019-05-17 DIAGNOSIS — F32.A ANXIETY AND DEPRESSION: ICD-10-CM

## 2019-05-17 DIAGNOSIS — I10 ESSENTIAL HYPERTENSION: ICD-10-CM

## 2019-05-17 PROCEDURE — 99214 OFFICE O/P EST MOD 30 MIN: CPT | Performed by: FAMILY MEDICINE

## 2019-06-04 DIAGNOSIS — I10 ESSENTIAL HYPERTENSION: ICD-10-CM

## 2019-06-04 DIAGNOSIS — E11.42 TYPE 2 DIABETES MELLITUS WITH DIABETIC POLYNEUROPATHY, WITHOUT LONG-TERM CURRENT USE OF INSULIN (HCC): Primary | ICD-10-CM

## 2019-06-04 RX ORDER — POTASSIUM CHLORIDE 750 MG/1
TABLET, FILM COATED, EXTENDED RELEASE ORAL
Qty: 180 TABLET | Refills: 1 | Status: SHIPPED | OUTPATIENT
Start: 2019-06-04 | End: 2020-01-09 | Stop reason: SDUPTHER

## 2019-06-04 RX ORDER — SITAGLIPTIN 100 MG/1
TABLET, FILM COATED ORAL
Qty: 30 TABLET | Refills: 3 | Status: SHIPPED | OUTPATIENT
Start: 2019-06-04 | End: 2019-10-08 | Stop reason: SDUPTHER

## 2019-06-28 DIAGNOSIS — E11.65 UNCONTROLLED TYPE 2 DIABETES MELLITUS WITH HYPERGLYCEMIA (HCC): ICD-10-CM

## 2019-06-28 RX ORDER — METFORMIN HYDROCHLORIDE 500 MG/1
TABLET, EXTENDED RELEASE ORAL
Qty: 360 TABLET | Refills: 1 | Status: SHIPPED | OUTPATIENT
Start: 2019-06-28 | End: 2020-01-09 | Stop reason: SDUPTHER

## 2019-08-07 DIAGNOSIS — F32.A ANXIETY AND DEPRESSION: ICD-10-CM

## 2019-08-07 DIAGNOSIS — F41.9 ANXIETY AND DEPRESSION: ICD-10-CM

## 2019-08-08 RX ORDER — CLONAZEPAM 1 MG/1
TABLET ORAL
Qty: 30 TABLET | Refills: 2 | Status: SHIPPED | OUTPATIENT
Start: 2019-08-08 | End: 2020-01-09 | Stop reason: SDUPTHER

## 2019-08-09 ENCOUNTER — TELEPHONE (OUTPATIENT)
Dept: FAMILY MEDICINE CLINIC | Facility: CLINIC | Age: 74
End: 2019-08-09

## 2019-08-09 DIAGNOSIS — E11.42 TYPE 2 DIABETES MELLITUS WITH DIABETIC POLYNEUROPATHY, WITHOUT LONG-TERM CURRENT USE OF INSULIN (HCC): Primary | ICD-10-CM

## 2019-08-09 RX ORDER — CEPHALEXIN 500 MG/1
1000 CAPSULE ORAL EVERY 12 HOURS SCHEDULED
Qty: 28 CAPSULE | Refills: 0 | Status: SHIPPED | OUTPATIENT
Start: 2019-08-09 | End: 2019-08-16

## 2019-08-09 NOTE — TELEPHONE ENCOUNTER
PT has not been feeling well, she had some keflex but ran out, can you send in a RX for more to [facility] in [place]

## 2019-08-12 ENCOUNTER — TELEPHONE (OUTPATIENT)
Dept: FAMILY MEDICINE CLINIC | Facility: CLINIC | Age: 74
End: 2019-08-12

## 2019-08-12 DIAGNOSIS — Z12.39 BREAST CANCER SCREENING: Primary | ICD-10-CM

## 2019-08-12 RX ORDER — BLOOD-GLUCOSE METER
KIT MISCELLANEOUS
Qty: 100 EACH | Refills: 5 | Status: SHIPPED | OUTPATIENT
Start: 2019-08-12 | End: 2020-01-07

## 2019-09-07 DIAGNOSIS — I10 ESSENTIAL HYPERTENSION: Primary | ICD-10-CM

## 2019-09-09 RX ORDER — HYDROCHLOROTHIAZIDE 50 MG/1
TABLET ORAL
Qty: 90 TABLET | Refills: 3 | Status: SHIPPED | OUTPATIENT
Start: 2019-09-09 | End: 2020-08-31

## 2019-09-20 ENCOUNTER — APPOINTMENT (OUTPATIENT)
Dept: LAB | Facility: CLINIC | Age: 74
End: 2019-09-20
Payer: MEDICARE

## 2019-09-20 DIAGNOSIS — Z11.59 ENCOUNTER FOR HEPATITIS C SCREENING TEST FOR LOW RISK PATIENT: ICD-10-CM

## 2019-09-20 DIAGNOSIS — E11.42 TYPE 2 DIABETES MELLITUS WITH DIABETIC POLYNEUROPATHY, WITHOUT LONG-TERM CURRENT USE OF INSULIN (HCC): ICD-10-CM

## 2019-09-20 LAB
ALBUMIN SERPL BCP-MCNC: 3.8 G/DL (ref 3.5–5)
ALP SERPL-CCNC: 59 U/L (ref 46–116)
ALT SERPL W P-5'-P-CCNC: 15 U/L (ref 12–78)
ANION GAP SERPL CALCULATED.3IONS-SCNC: 5 MMOL/L (ref 4–13)
AST SERPL W P-5'-P-CCNC: 12 U/L (ref 5–45)
BASOPHILS # BLD AUTO: 0.05 THOUSANDS/ΜL (ref 0–0.1)
BASOPHILS NFR BLD AUTO: 1 % (ref 0–1)
BILIRUB SERPL-MCNC: 0.19 MG/DL (ref 0.2–1)
BUN SERPL-MCNC: 10 MG/DL (ref 5–25)
CALCIUM SERPL-MCNC: 10.1 MG/DL (ref 8.3–10.1)
CHLORIDE SERPL-SCNC: 96 MMOL/L (ref 100–108)
CHOLEST SERPL-MCNC: 186 MG/DL (ref 50–200)
CO2 SERPL-SCNC: 31 MMOL/L (ref 21–32)
CREAT SERPL-MCNC: 0.6 MG/DL (ref 0.6–1.3)
CREAT UR-MCNC: 179 MG/DL
EOSINOPHIL # BLD AUTO: 0.23 THOUSAND/ΜL (ref 0–0.61)
EOSINOPHIL NFR BLD AUTO: 3 % (ref 0–6)
ERYTHROCYTE [DISTWIDTH] IN BLOOD BY AUTOMATED COUNT: 12.9 % (ref 11.6–15.1)
EST. AVERAGE GLUCOSE BLD GHB EST-MCNC: 148 MG/DL
GFR SERPL CREATININE-BSD FRML MDRD: 91 ML/MIN/1.73SQ M
GLUCOSE P FAST SERPL-MCNC: 140 MG/DL (ref 65–99)
HBA1C MFR BLD: 6.8 % (ref 4.2–6.3)
HCT VFR BLD AUTO: 48.8 % (ref 34.8–46.1)
HCV AB SER QL: NORMAL
HDLC SERPL-MCNC: 49 MG/DL (ref 40–60)
HGB BLD-MCNC: 15.8 G/DL (ref 11.5–15.4)
IMM GRANULOCYTES # BLD AUTO: 0.01 THOUSAND/UL (ref 0–0.2)
IMM GRANULOCYTES NFR BLD AUTO: 0 % (ref 0–2)
LDLC SERPL CALC-MCNC: 107 MG/DL (ref 0–100)
LYMPHOCYTES # BLD AUTO: 2.63 THOUSANDS/ΜL (ref 0.6–4.47)
LYMPHOCYTES NFR BLD AUTO: 28 % (ref 14–44)
MCH RBC QN AUTO: 30.7 PG (ref 26.8–34.3)
MCHC RBC AUTO-ENTMCNC: 32.4 G/DL (ref 31.4–37.4)
MCV RBC AUTO: 95 FL (ref 82–98)
MICROALBUMIN UR-MCNC: 54 MG/L (ref 0–20)
MICROALBUMIN/CREAT 24H UR: 30 MG/G CREATININE (ref 0–30)
MONOCYTES # BLD AUTO: 0.52 THOUSAND/ΜL (ref 0.17–1.22)
MONOCYTES NFR BLD AUTO: 6 % (ref 4–12)
NEUTROPHILS # BLD AUTO: 5.81 THOUSANDS/ΜL (ref 1.85–7.62)
NEUTS SEG NFR BLD AUTO: 62 % (ref 43–75)
NONHDLC SERPL-MCNC: 137 MG/DL
NRBC BLD AUTO-RTO: 0 /100 WBCS
PLATELET # BLD AUTO: 283 THOUSANDS/UL (ref 149–390)
PMV BLD AUTO: 9.9 FL (ref 8.9–12.7)
POTASSIUM SERPL-SCNC: 4 MMOL/L (ref 3.5–5.3)
PROT SERPL-MCNC: 7.1 G/DL (ref 6.4–8.2)
RBC # BLD AUTO: 5.15 MILLION/UL (ref 3.81–5.12)
SODIUM SERPL-SCNC: 132 MMOL/L (ref 136–145)
TRIGL SERPL-MCNC: 152 MG/DL
WBC # BLD AUTO: 9.25 THOUSAND/UL (ref 4.31–10.16)

## 2019-09-20 PROCEDURE — 85025 COMPLETE CBC W/AUTO DIFF WBC: CPT

## 2019-09-20 PROCEDURE — 82570 ASSAY OF URINE CREATININE: CPT | Performed by: FAMILY MEDICINE

## 2019-09-20 PROCEDURE — 80053 COMPREHEN METABOLIC PANEL: CPT

## 2019-09-20 PROCEDURE — 36415 COLL VENOUS BLD VENIPUNCTURE: CPT

## 2019-09-20 PROCEDURE — 82043 UR ALBUMIN QUANTITATIVE: CPT | Performed by: FAMILY MEDICINE

## 2019-09-20 PROCEDURE — 86803 HEPATITIS C AB TEST: CPT

## 2019-09-20 PROCEDURE — 83036 HEMOGLOBIN GLYCOSYLATED A1C: CPT

## 2019-09-20 PROCEDURE — 80061 LIPID PANEL: CPT

## 2019-09-23 ENCOUNTER — OFFICE VISIT (OUTPATIENT)
Dept: FAMILY MEDICINE CLINIC | Facility: CLINIC | Age: 74
End: 2019-09-23
Payer: MEDICARE

## 2019-09-23 VITALS
DIASTOLIC BLOOD PRESSURE: 72 MMHG | HEIGHT: 61 IN | BODY MASS INDEX: 31.72 KG/M2 | TEMPERATURE: 98.6 F | OXYGEN SATURATION: 96 % | WEIGHT: 168 LBS | HEART RATE: 84 BPM | SYSTOLIC BLOOD PRESSURE: 132 MMHG

## 2019-09-23 DIAGNOSIS — F41.9 ANXIETY AND DEPRESSION: ICD-10-CM

## 2019-09-23 DIAGNOSIS — Z00.00 MEDICARE ANNUAL WELLNESS VISIT, SUBSEQUENT: ICD-10-CM

## 2019-09-23 DIAGNOSIS — F32.A ANXIETY AND DEPRESSION: ICD-10-CM

## 2019-09-23 DIAGNOSIS — E11.42 TYPE 2 DIABETES MELLITUS WITH DIABETIC POLYNEUROPATHY, WITHOUT LONG-TERM CURRENT USE OF INSULIN (HCC): Primary | ICD-10-CM

## 2019-09-23 DIAGNOSIS — I10 ESSENTIAL HYPERTENSION: ICD-10-CM

## 2019-09-23 PROCEDURE — 99214 OFFICE O/P EST MOD 30 MIN: CPT | Performed by: FAMILY MEDICINE

## 2019-09-23 PROCEDURE — G0439 PPPS, SUBSEQ VISIT: HCPCS | Performed by: FAMILY MEDICINE

## 2019-09-23 RX ORDER — SUCRALFATE 1 G/1
1 TABLET ORAL 4 TIMES DAILY
COMMUNITY

## 2019-09-23 NOTE — PATIENT INSTRUCTIONS
10% - bad control"> 10% - bad control,Hemoglobin A1c (HbA1c) greater than 10% indicating poor diabetic control,Haemoglobin A1c greater than 10% indicating poor diabetic control">   Diabetes Mellitus Type 2 in Adults, Ambulatory Care   GENERAL INFORMATION:   Diabetes mellitus type 2  is a disease that affects how your body uses glucose (sugar)  Insulin helps move sugar out of the blood so it can be used for energy  Normally, when the blood sugar level increases, the pancreas makes more insulin  Type 2 diabetes develops because either the body cannot make enough insulin, or it cannot use the insulin correctly  After many years, your pancreas may stop making insulin  Common symptoms include the following:   · More hunger or thirst than usual     · Frequent urination     · Weight loss without trying     · Blurred vision  Seek immediate care for the following symptoms:   · Severe abdominal pain, or pain that spreads to your back  You may also be vomiting  · Trouble staying awake or focusing    · Shaking or sweating    · Blurred or double vision    · Breath has a fruity, sweet smell    · Breathing is deep and labored, or rapid and shallow    · Heartbeat is fast and weak  Treatment for diabetes mellitus type 2  includes keeping your blood sugar at a normal level  You must eat the right foods, and exercise regularly  You may also need medicine if you cannot control your blood sugar level with nutrition and exercise  Manage diabetes mellitus type 2:   · Check your blood sugar level  You will be taught how to check a small drop of blood in a glucose monitor  Ask your healthcare provider when and how often to check during the day  Ask your healthcare provider what your blood sugar levels should be when you check them  · Keep track of carbohydrates (sugar and starchy foods)  Your blood sugar level can get too high if you eat too many carbohydrates   Your dietitian will help you plan meals and snacks that have the right amount of carbohydrates  · Eat low-fat foods  Some examples are skinless chicken and low-fat milk  · Eat less sodium (salt)  Some examples of high-sodium foods to limit are soy sauce, potato chips, and soup  Do not add salt to food you cook  Limit your use of table salt  · Eat high-fiber foods  Foods that are a good source of fiber include vegetables, whole grain bread, and beans  · Limit alcohol  Alcohol affects your blood sugar level and can make it harder to manage your diabetes  Women should limit alcohol to 1 drink a day  Men should limit alcohol to 2 drinks a day  A drink of alcohol is 12 ounces of beer, 5 ounces of wine, or 1½ ounces of liquor  · Get regular exercise  Exercise can help keep your blood sugar level steady, decrease your risk of heart disease, and help you lose weight  Exercise for at least 30 minutes, 5 days a week  Include muscle strengthening activities 2 days each week  Work with your healthcare provider to create an exercise plan  · Check your feet each day  for injuries or open sores  Ask your healthcare provider for activities you can do if you have an open sore  · Quit smoking  If you smoke, it is never too late to quit  Smoking can worsen the problems that may occur with diabetes  Ask your healthcare provider for information about how to stop smoking if you are having trouble quitting  · Ask about your weight:  Ask healthcare providers if you need to lose weight, and how much to lose  Ask them to help you with a weight loss program  Even a 10 to 15 pound weight loss can help you manage your blood sugar level  · Carry medical alert identification  Wear medical alert jewelry or carry a card that says you have diabetes  Ask your healthcare provider where to get these items  · Ask about vaccines  Diabetes puts you at risk of serious illness if you get the flu, pneumonia, or hepatitis   Ask your healthcare provider if you should get a flu, pneumonia, or hepatitis B vaccine, and when to get the vaccine  Follow up with your healthcare provider as directed:  Write down your questions so you remember to ask them during your visits  CARE AGREEMENT:   You have the right to help plan your care  Learn about your health condition and how it may be treated  Discuss treatment options with your caregivers to decide what care you want to receive  You always have the right to refuse treatment  The above information is an  only  It is not intended as medical advice for individual conditions or treatments  Talk to your doctor, nurse or pharmacist before following any medical regimen to see if it is safe and effective for you  © 2014 [address] is for End User's use only and may not be sold, redistributed or otherwise used for commercial purposes  All illustrations and images included in CareNotes® are the copyrighted property of A D A M , Inc  or Nicola Hernández

## 2019-09-23 NOTE — PROGRESS NOTES
Assessment/Plan:       Problem List Items Addressed This Visit        Endocrine    Type 2 diabetes mellitus with diabetic polyneuropathy ([facility] [address] ) - Primary     Lab Results   Component Value Date    HGBA1C 6 8 (H) 09/20/2019       No results for input(s): POCGLU in the last 72 hours  Blood Sugar Average: Last 72 hrs:   diabetes with neuropathy A1c is at 6 8 at this time she will maintain diet exercise and reassess in 4 months  Reviewed lab work at this time with patient            Cardiovascular and Mediastinum    Essential hypertension     Essential hypertension stable at this time continue with current medication blood pressure 132/72 no change at this point in regimen continue with diet exercise and avoid sodium            Other    Anxiety and depression     Anxiety depression patient is stable at this time continue with current medications clonazepam as needed sleep patterns have been improved         Medicare annual wellness visit, subsequent            Subjective:      Patient ID: Adam Klein is a 69 y o  female  Patient presents today for annual Medicare wellness visit doing well overall here to review lab work and medications  Diarrhea and had upper endoscopy  The following portions of the patient's history were reviewed and updated as appropriate: allergies, current medications, past family history, past medical history, past social history, past surgical history and problem list     Review of Systems   Constitutional: Negative for chills, fatigue and fever  HENT: Positive for congestion and rhinorrhea  Negative for nosebleeds, sinus pressure and sore throat  Eyes: Negative for discharge and redness  Respiratory: Negative for cough and shortness of breath  Cardiovascular: Negative for chest pain, palpitations and leg swelling  Gastrointestinal: Negative for abdominal pain, blood in stool and nausea  Endocrine: Negative for cold intolerance, heat intolerance and polyuria  Genitourinary: Negative for dysuria and frequency  Musculoskeletal: Positive for arthralgias  Negative for back pain and myalgias  Skin: Negative for rash  Neurological: Negative for dizziness, weakness and headaches  Hematological: Negative for adenopathy  Psychiatric/Behavioral: Negative for behavioral problems and sleep disturbance  The patient is not nervous/anxious  Objective:      /72 (BP Location: Left arm, Patient Position: Sitting)   Pulse 84   Temp 98 6 °F (37 °C) (Tympanic)   Ht 5' 1" (1 549 m)   Wt 76 2 kg (168 lb)   SpO2 96%   BMI 31 74 kg/m²        Physical Exam   Constitutional: She is oriented to person, place, and time  She appears well-developed and well-nourished  No distress  HENT:   Head: Normocephalic and atraumatic  Right Ear: External ear normal    Left Ear: External ear normal    Nose: Nose normal    Mouth/Throat: Oropharynx is clear and moist  No oropharyngeal exudate  Eyes: Pupils are equal, round, and reactive to light  Conjunctivae and EOM are normal  Right eye exhibits no discharge  Left eye exhibits no discharge  No scleral icterus  Neck: Normal range of motion  No JVD present  No thyromegaly present  Cardiovascular: Normal rate, regular rhythm and normal heart sounds  No murmur heard  Pulmonary/Chest: Effort normal  She has no wheezes  She has no rales  She exhibits no tenderness  Abdominal: Soft  Bowel sounds are normal  She exhibits no distension and no mass  There is no tenderness  Musculoskeletal: Normal range of motion  She exhibits no edema, tenderness or deformity  Lymphadenopathy:     She has no cervical adenopathy  Neurological: She is alert and oriented to person, place, and time  She has normal reflexes  She displays normal reflexes  No cranial nerve deficit  Coordination normal    Skin: Skin is warm and dry  No rash noted  Psychiatric: She has a normal mood and affect   Her behavior is normal  Judgment and thought content normal    Nursing note and vitals reviewed  Data:    Laboratory Results: I have personally reviewed the pertinent laboratory results/reports   Radiology/Other Diagnostic Testing Results: I have personally reviewed pertinent reports         Lab Results   Component Value Date    WBC 9 25 09/20/2019    HGB 15 8 (H) 09/20/2019    HCT 48 8 (H) 09/20/2019    MCV 95 09/20/2019     09/20/2019     Lab Results   Component Value Date     05/21/2018    K 4 0 09/20/2019    CL 96 (L) 09/20/2019    CO2 31 09/20/2019    ANIONGAP 14 0 05/21/2018    BUN 10 09/20/2019    CREATININE 0 60 09/20/2019    GLUF 140 (H) 09/20/2019    CALCIUM 10 1 09/20/2019    AST 12 09/20/2019    ALT 15 09/20/2019    ALKPHOS 59 09/20/2019    PROT 6 4 05/21/2018    BILITOT 0 4 05/21/2018    EGFR 91 09/20/2019     Lab Results   Component Value Date    CHOLESTEROL 186 09/20/2019    CHOLESTEROL 164 05/07/2019    CHOLESTEROL 184 01/11/2019     Lab Results   Component Value Date    HDL 49 09/20/2019    HDL 46 05/07/2019    HDL 49 01/11/2019     Lab Results   Component Value Date    LDLCALC 107 (H) 09/20/2019    LDLCALC 85 05/07/2019    LDLCALC 99 01/11/2019     Lab Results   Component Value Date    TRIG 152 (H) 09/20/2019    TRIG 166 (H) 05/07/2019    TRIG 180 (H) 01/11/2019     No results found for: [place], [place]  Lab Results   Component Value Date    [id number] 2 630 05/07/2019     Lab Results   Component Value Date    HGBA1C 6 8 (H) 09/20/2019     No results found for: PSA    Rafita Brewer DO

## 2019-09-23 NOTE — ASSESSMENT & PLAN NOTE
Anxiety depression patient is stable at this time continue with current medications clonazepam as needed sleep patterns have been improved

## 2019-09-23 NOTE — ASSESSMENT & PLAN NOTE
Lab Results   Component Value Date    HGBA1C 6 8 (H) 09/20/2019       No results for input(s): POCGLU in the last 72 hours  Blood Sugar Average: Last 72 hrs:   diabetes with neuropathy A1c is at 6 8 at this time she will maintain diet exercise and reassess in 4 months    Reviewed lab work at this time with patient

## 2019-09-23 NOTE — PROGRESS NOTES
Assessment and Plan:     Problem List Items Addressed This Visit     None           Preventive health issues were discussed with patient, and age appropriate screening tests were ordered as noted in patient's After Visit Summary  Personalized health advice and appropriate referrals for health education or preventive services given if needed, as noted in patient's After Visit Summary  History of Present Illness:     Patient presents for Medicare Annual Wellness visit    Patient Care Team:  Marvin Stratton DO as PCP - General (Family Medicine)     Problem List:     Patient Active Problem List   Diagnosis    Type 2 diabetes mellitus with diabetic polyneuropathy ([facility] )    Essential hypertension    Anxiety and depression      Past Medical and Surgical History:     Past Medical History:   Diagnosis Date    Anxiety     Diabetes mellitus ([facility] )     Hypertension     IBS (irritable bowel syndrome)      History reviewed  No pertinent surgical history     Family History:     Family History   Problem Relation Age of Onset   Bertrand Emphysema Mother     COPD Mother     Emphysema Father       Social History:     Social History     Socioeconomic History    Marital status: /Civil Union     Spouse name: None    Number of children: None    Years of education: None    Highest education level: None   Occupational History    None   Social Needs    Financial resource strain: None    Food insecurity:     Worry: None     Inability: None    Transportation needs:     Medical: None     Non-medical: None   Tobacco Use    Smoking status: Current Every Day Smoker     Packs/day: 1 50    Smokeless tobacco: Never Used   Substance and Sexual Activity    Alcohol use: Not Currently    Drug use: None    Sexual activity: None   Lifestyle    Physical activity:     Days per week: None     Minutes per session: None    Stress: None   Relationships    Social connections:     Talks on phone: None     Gets together: None Attends Anabaptist service: None     Active member of club or organization: None     Attends meetings of clubs or organizations: None     Relationship status: None    Intimate partner violence:     Fear of current or ex partner: None     Emotionally abused: None     Physically abused: None     Forced sexual activity: None   Other Topics Concern    None   Social History Narrative    None       Medications and Allergies:     Current Outpatient Medications   Medication Sig Dispense Refill    Aspirin (ASPIR-81 PO) take one by mouth daily      buPROPion (WELLBUTRIN XL) 300 mg 24 hr tablet Take 1 tablet (300 mg total) by mouth daily 90 tablet 0    clonazePAM (KlonoPIN) 1 mg tablet TAKE 1 TABLET BY MOUTH EVERY EVENING 30 tablet 2    FREESTYLE LITE test strip TEST TWICE A  each 5    gabapentin (NEURONTIN) 400 mg capsule Take 800 mg by mouth      hydrochlorothiazide (HYDRODIURIL) 50 mg tablet TAKE ONE TABLET BY MOUTH EVERY DAY 90 tablet 3    JANUVIA 100 MG tablet TAKE 1 TABLET BY MOUTH ONCE DAILY 30 tablet 3    metFORMIN (GLUCOPHAGE-XR) 500 mg 24 hr tablet TAKE 2 TABLETS BY MOUTH TWO TIMES A DAY WITH MEALS 360 tablet 1    naproxen sodium (ALEVE) 220 MG tablet Take 220 mg by mouth      niacin 500 mg ER capsule Take 500 mg by mouth daily at bedtime      Omega-3 Fatty Acids (FISH OIL PO) take one tab/cap by mouth daily      pantoprazole (PROTONIX) 40 mg tablet       potassium chloride (K-DUR) 10 mEq tablet TAKE 2 TABLETS BY MOUTH ONCE DAILY 180 tablet 1    sucralfate (CARAFATE) 1 g tablet Take 1 g by mouth 4 (four) times a day       No current facility-administered medications for this visit  Allergies   Allergen Reactions    Adhesive [Medical Tape]     Amoxicillin-Pot Clavulanate Hives     Patient states that she is not allergic to this medication      Latex     Paroxetine     Sulfa Antibiotics     Sulfamethoxazole-Trimethoprim Hives      Immunizations:     Immunization History   Administered Date(s) Administered    Hep B / HiB 03/27/2013, 04/30/2013, 10/03/2013    INFLUENZA 03/24/2014    Tdap 01/29/2017      Health Maintenance:         Topic Date Due    MAMMOGRAM  08/10/2019    CRC Screening: Colonoscopy  09/15/2021    Hepatitis C Screening  Completed         Topic Date Due    INFLUENZA VACCINE  07/01/2019      Medicare Health Risk Assessment:     /72 (BP Location: Left arm, Patient Position: Sitting)   Pulse 84   Temp 98 6 °F (37 °C) (Tympanic)   Ht 5' 1" (1 549 m)   Wt 76 2 kg (168 lb)   SpO2 96%   BMI 31 74 kg/m²      Gemma is here for her Subsequent Wellness visit  Health Risk Assessment:   Patient rates overall health as good  Patient feels that their physical health rating is same  Eyesight was rated as same  Hearing was rated as same  Patient feels that their emotional and mental health rating is slightly better  Pain experienced in the last 7 days has been some  Patient's pain rating has been 6/10  Patient states that she has experienced no weight loss or gain in last 6 months  Depression Screening:   PHQ-2 Score: 0  PHQ-9 Score: 0      Fall Risk Screening: In the past year, patient has experienced: no history of falling in past year      Urinary Incontinence Screening:   Patient has leaked urine accidently in the last six months  Home Safety:  Patient does not have trouble with stairs inside or outside of their home  Patient has working smoke alarms and has working carbon monoxide detector  Home safety hazards include: none  Nutrition:   Current diet is Diabetic and Limited junk food  Medications:   Patient is currently taking over-the-counter supplements  OTC medications include: see medication list  Patient is able to manage medications       Activities of Daily Living (ADLs)/Instrumental Activities of Daily Living (IADLs):   Walk and transfer into and out of bed and chair?: Yes  Dress and groom yourself?: Yes    Bathe or shower yourself?: Yes    Feed yourself?  Yes  Do your laundry/housekeeping?: Yes  Manage your money, pay your bills and track your expenses?: Yes  Make your own meals?: Yes    Do your own shopping?: Yes    Previous Hospitalizations:   Any hospitalizations or ED visits within the last 12 months?: No      Advance Care Planning:     End of Life Decisions reviewed with patient: Yes      PREVENTIVE SCREENINGS      Cardiovascular Screening:    General: Screening Current      Diabetes Screening:     General: Screening Not Indicated and History Diabetes      Colorectal Cancer Screening:     General: Screening Current      Breast Cancer Screening:     General: Screening Current      Cervical Cancer Screening:    General: Screening Not Indicated and Risks and Benefits Discussed      Osteoporosis Screening:    General: Risks and Benefits Discussed      Abdominal Aortic Aneurysm (AAA) Screening:        General: Risks and Benefits Discussed      Lung Cancer Screening:     General: Risks and Benefits Discussed      Hepatitis C Screening:    General: Screening Current      Shady Jaramillo, DO

## 2019-09-23 NOTE — ASSESSMENT & PLAN NOTE
Essential hypertension stable at this time continue with current medication blood pressure 132/72 no change at this point in regimen continue with diet exercise and avoid sodium

## 2019-10-08 DIAGNOSIS — E11.42 TYPE 2 DIABETES MELLITUS WITH DIABETIC POLYNEUROPATHY, WITHOUT LONG-TERM CURRENT USE OF INSULIN (HCC): ICD-10-CM

## 2019-10-08 DIAGNOSIS — F41.9 ANXIETY AND DEPRESSION: ICD-10-CM

## 2019-10-08 DIAGNOSIS — I10 ESSENTIAL HYPERTENSION: ICD-10-CM

## 2019-10-08 DIAGNOSIS — F32.A ANXIETY AND DEPRESSION: ICD-10-CM

## 2019-10-08 RX ORDER — BUPROPION HYDROCHLORIDE 300 MG/1
300 TABLET ORAL DAILY
Qty: 90 TABLET | Refills: 0 | Status: SHIPPED | OUTPATIENT
Start: 2019-10-08 | End: 2020-02-24

## 2019-10-16 ENCOUNTER — OFFICE VISIT (OUTPATIENT)
Dept: FAMILY MEDICINE CLINIC | Facility: CLINIC | Age: 74
End: 2019-10-16
Payer: MEDICARE

## 2019-10-16 VITALS
WEIGHT: 163 LBS | OXYGEN SATURATION: 97 % | BODY MASS INDEX: 30.78 KG/M2 | HEIGHT: 61 IN | HEART RATE: 84 BPM | DIASTOLIC BLOOD PRESSURE: 82 MMHG | SYSTOLIC BLOOD PRESSURE: 140 MMHG

## 2019-10-16 DIAGNOSIS — Z01.00 DIABETIC EYE EXAM (HCC): Primary | ICD-10-CM

## 2019-10-16 DIAGNOSIS — I10 ESSENTIAL HYPERTENSION: ICD-10-CM

## 2019-10-16 DIAGNOSIS — K21.9 GERD WITHOUT ESOPHAGITIS: ICD-10-CM

## 2019-10-16 DIAGNOSIS — E11.42 TYPE 2 DIABETES MELLITUS WITH DIABETIC POLYNEUROPATHY, WITHOUT LONG-TERM CURRENT USE OF INSULIN (HCC): ICD-10-CM

## 2019-10-16 DIAGNOSIS — E11.9 DIABETIC EYE EXAM (HCC): Primary | ICD-10-CM

## 2019-10-16 PROCEDURE — 99214 OFFICE O/P EST MOD 30 MIN: CPT | Performed by: FAMILY MEDICINE

## 2019-10-16 RX ORDER — DEXLANSOPRAZOLE 60 MG/1
60 CAPSULE, DELAYED RELEASE ORAL DAILY
Qty: 30 CAPSULE | Refills: 5 | Status: SHIPPED | OUTPATIENT
Start: 2019-10-16

## 2019-10-16 NOTE — ASSESSMENT & PLAN NOTE
GERD symptoms without esophagitis she has taken Protonix she try doubling it along with Carafate but developed diarrhea she tried AcipHex which did not help she is in uncomfortable pain at this point and requesting further treatment    At this point I would like her to try Dexilant to see if that has a better control for her and add Pepcid Complete to be taken twice daily

## 2019-10-16 NOTE — PROGRESS NOTES
Diabetic Foot Exam    Patient's shoes and socks removed  Right Foot/Ankle   Right Foot Inspection  Skin Exam: skin normal and skin intact no dry skin, no warmth, no callus, no erythema, no maceration, no abnormal color, no pre-ulcer, no ulcer and no callus                          Toe Exam: ROM and strength within normal limits  Sensory       Monofilament testing: intact  Vascular    The right DP pulse is 2+  Right Toe  - Comprehensive Exam  Ecchymosis: none  Arch: normal  Hammertoes: absent  Claw Toes: absent  Swelling: none   Tenderness: none         Left Foot/Ankle  Left Foot Inspection  Skin Exam: skin normal and skin intactno dry skin, no warmth, no erythema, no maceration, normal color, no pre-ulcer, no ulcer and no callus                         Toe Exam: ROM and strength within normal limits                   Sensory       Monofilament: intact  Vascular    The left DP pulse is 2+  Left Toe  - Comprehensive Exam  Ecchymosis: none  Arch: normal  Hammertoes: absent  Claw toes: absent  Swelling: none   Tenderness: none       Assign Risk Category:  No deformity present; No loss of protective sensation;  No weak pulses       Risk: 1

## 2019-10-16 NOTE — PATIENT INSTRUCTIONS
Diet for Stomach Ulcers and Gastritis   WHAT YOU NEED TO KNOW:   What is a diet for stomach ulcers and gastritis? A diet for ulcers and gastritis is a meal plan that limits foods that irritate your stomach  Certain foods may worsen symptoms such as stomach pain, bloating, heartburn, or indigestion  Which foods should I limit or avoid? You may need to avoid acidic, spicy, or high-fat foods  Not all foods affect everyone the same way  You will need to learn which foods worsen your symptoms and limit those foods  The following are some foods that may worsen ulcer or gastritis symptoms:  · Beverages:      ¨ Whole milk and chocolate milk    ¨ Hot cocoa and cola    ¨ Any beverage with caffeine    ¨ Regular and decaffeinated coffee    ¨ Peppermint and spearmint tea    ¨ Green and black tea, with or without caffeine    ¨ Orange and grapefruit juices    ¨ Drinks that contain alcohol    · Spices and seasonings:      ¨ Black and red pepper    ¨ Chili powder    ¨ Mustard seed and nutmeg    · Other foods:      ¨ Dairy foods made from whole milk or cream    ¨ Chocolate    ¨ Spicy or strongly flavored cheeses, such as jalapeno or black pepper    ¨ Highly seasoned, high-fat meats, such as sausage, salami, morillo, ham, and cold cuts    ¨ Hot chiles and peppers    ¨ Tomato products, such as tomato paste, tomato sauce, or tomato juice  Which foods can I eat and drink? Eat a variety of healthy foods from all the food groups  Eat fruits, vegetables, whole grains, and fat-free or low-fat dairy foods  Whole grains include whole-wheat breads, cereals, pasta, and brown rice  Choose lean meats, poultry (chicken and turkey), fish, beans, eggs, and nuts  A healthy meal plan is low in unhealthy fats, salt, and added sugar  Healthy fats include olive oil and canola oil  Ask your dietitian for more information about a healthy meal plan  What other guidelines may be helpful? · Do not eat right before bedtime    Stop eating at least 2 hours before bedtime  · Eat small, frequent meals  Your stomach may tolerate small, frequent meals better than large meals  CARE AGREEMENT:   You have the right to help plan your care  Discuss treatment options with your caregivers to decide what care you want to receive  You always have the right to refuse treatment  The above information is an  only  It is not intended as medical advice for individual conditions or treatments  Talk to your doctor, nurse or pharmacist before following any medical regimen to see if it is safe and effective for you  © 2017 [address] Information is for End User's use only and may not be sold, redistributed or otherwise used for commercial purposes  All illustrations and images included in CareNotes® are the copyrighted property of [employer]  or Nicola Hernández

## 2019-10-16 NOTE — PROGRESS NOTES
Assessment/Plan:       Problem List Items Addressed This Visit        Digestive    GERD without esophagitis     GERD symptoms without esophagitis she has taken Protonix she try doubling it along with Carafate but developed diarrhea she tried AcipHex which did not help she is in uncomfortable pain at this point and requesting further treatment  At this point I would like her to try Dexilant to see if that has a better control for her and add Pepcid Complete to be taken twice daily         Relevant Medications    dexlansoprazole (DEXILANT) 60 MG capsule       Endocrine    Type 2 diabetes mellitus with diabetic polyneuropathy ([facility] )       Lab Results   Component Value Date    HGBA1C 6 8 (H) 09/20/2019    Continue current medication follow-up with next office visit with A1c            Cardiovascular and Mediastinum    Essential hypertension     Hypertension controlled at this time continue current medication as directed follow-up at next office visit           Other Visit Diagnoses     Diabetic eye exam [facility])    -  Primary    Relevant Orders    Ambulatory Referral to Ophthalmology            Subjective:      Patient ID: Jin Benítez is a 69 y o  female  HPI    The following portions of the patient's history were reviewed and updated as appropriate: allergies, current medications, past family history, past medical history, past social history, past surgical history and problem list     Review of Systems   Gastrointestinal: Positive for abdominal distention, abdominal pain, diarrhea and nausea  Objective:      /82 (BP Location: Left arm, Patient Position: Sitting)   Pulse 84   Ht 5' 1" (1 549 m)   Wt 73 9 kg (163 lb)   SpO2 97%   BMI 30 80 kg/m²        Physical Exam   Constitutional: She is oriented to person, place, and time  She appears well-developed and well-nourished  No distress  HENT:   Head: Normocephalic and atraumatic     Right Ear: External ear normal    Left Ear: External ear normal  Nose: Nose normal    Mouth/Throat: Oropharynx is clear and moist  No oropharyngeal exudate  Eyes: Pupils are equal, round, and reactive to light  Conjunctivae and EOM are normal  Right eye exhibits no discharge  Left eye exhibits no discharge  No scleral icterus  Neck: Normal range of motion  No JVD present  No thyromegaly present  Cardiovascular: Normal rate, regular rhythm and normal heart sounds  No murmur heard  Pulmonary/Chest: Effort normal  She has no wheezes  She has no rales  She exhibits no tenderness  Abdominal: Soft  Bowel sounds are normal  She exhibits no distension and no mass  There is tenderness in the right upper quadrant and epigastric area  Musculoskeletal: Normal range of motion  She exhibits no edema, tenderness or deformity  Lymphadenopathy:     She has no cervical adenopathy  Neurological: She is alert and oriented to person, place, and time  She has normal reflexes  She displays normal reflexes  No cranial nerve deficit  Coordination normal    Skin: Skin is warm and dry  No rash noted  Psychiatric: She has a normal mood and affect  Her behavior is normal  Judgment and thought content normal    Nursing note and vitals reviewed  Data:    Laboratory Results: I have personally reviewed the pertinent laboratory results/reports   Radiology/Other Diagnostic Testing Results: I have personally reviewed pertinent reports         Lab Results   Component Value Date    WBC 9 25 09/20/2019    HGB 15 8 (H) 09/20/2019    HCT 48 8 (H) 09/20/2019    MCV 95 09/20/2019     09/20/2019     Lab Results   Component Value Date     05/21/2018    K 4 0 09/20/2019    CL 96 (L) 09/20/2019    CO2 31 09/20/2019    ANIONGAP 14 0 05/21/2018    BUN 10 09/20/2019    CREATININE 0 60 09/20/2019    GLUF 140 (H) 09/20/2019    CALCIUM 10 1 09/20/2019    AST 12 09/20/2019    ALT 15 09/20/2019    ALKPHOS 59 09/20/2019    PROT 6 4 05/21/2018    BILITOT 0 4 05/21/2018    EGFR 91 09/20/2019 Lab Results   Component Value Date    CHOLESTEROL 186 09/20/2019    CHOLESTEROL 164 05/07/2019    CHOLESTEROL 184 01/11/2019     Lab Results   Component Value Date    HDL 49 09/20/2019    HDL 46 05/07/2019    HDL 49 01/11/2019     Lab Results   Component Value Date    LDLCALC 107 (H) 09/20/2019    LDLCALC 85 05/07/2019    LDLCALC 99 01/11/2019     Lab Results   Component Value Date    TRIG 152 (H) 09/20/2019    TRIG 166 (H) 05/07/2019    TRIG 180 (H) 01/11/2019     No results found for: [place], [place]  Lab Results   Component Value Date    HCH8QCZOREVA 2 630 05/07/2019     Lab Results   Component Value Date    HGBA1C 6 8 (H) 09/20/2019     No results found for: DOE Doshi, DO

## 2019-10-16 NOTE — ASSESSMENT & PLAN NOTE
Hypertension controlled at this time continue current medication as directed follow-up at next office visit

## 2019-10-16 NOTE — ASSESSMENT & PLAN NOTE
Lab Results   Component Value Date    HGBA1C 6 8 (H) 09/20/2019    Continue current medication follow-up with next office visit with A1c

## 2019-10-21 ENCOUNTER — TELEPHONE (OUTPATIENT)
Dept: FAMILY MEDICINE CLINIC | Facility: CLINIC | Age: 74
End: 2019-10-21

## 2019-10-21 DIAGNOSIS — E11.42 TYPE 2 DIABETES MELLITUS WITH DIABETIC POLYNEUROPATHY, WITHOUT LONG-TERM CURRENT USE OF INSULIN (HCC): Primary | ICD-10-CM

## 2019-10-21 DIAGNOSIS — K21.9 GERD WITHOUT ESOPHAGITIS: Primary | ICD-10-CM

## 2019-10-21 RX ORDER — CEPHALEXIN 500 MG/1
1000 CAPSULE ORAL EVERY 12 HOURS SCHEDULED
Qty: 28 CAPSULE | Refills: 0 | Status: SHIPPED | OUTPATIENT
Start: 2019-10-21 | End: 2019-10-28

## 2019-10-21 NOTE — TELEPHONE ENCOUNTER
Notify patient referral to Dr Zane Dorman placed she needs an appointment ASAP she can contact them and notify me if any problem

## 2019-10-21 NOTE — TELEPHONE ENCOUNTER
She should continue with the [address] for this week until she sees the new gastroenterologist however if she does not get an appointment soon with a new gastroenterologist she can stop the [address] after the next 3 days if she does not see any significant difference or improvement on this she will need to stop it so the diarrhea resolves and contact me by the end of this week either way

## 2019-10-21 NOTE — TELEPHONE ENCOUNTER
Patient received  your message about antibiotic   Should she stop the Dexilant or continue taking as directed ?  Please advise

## 2019-10-21 NOTE — TELEPHONE ENCOUNTER
She was up all night for the past 3 nights with pain in stomach she got the dexilant and with the carafate it doesn't help it just gives diarrhea, tylenol does not help with the pain either, she is asking for abx

## 2019-10-21 NOTE — TELEPHONE ENCOUNTER
Notify patient antibiotic sent in she can start this and contact me if not improved also if she is interested in a 2nd opinion for a gastroenterologist I can refer her additionally for that    I recommend that she stop eating for 24 hours only clear liquids to allow her stomach to rest and this would reduce the acid secretions

## 2019-10-25 ENCOUNTER — TELEPHONE (OUTPATIENT)
Dept: FAMILY MEDICINE CLINIC | Facility: CLINIC | Age: 74
End: 2019-10-25

## 2019-10-25 NOTE — TELEPHONE ENCOUNTER
The keflex is not working   Been on for 4 days now  Also is in a lot of pain  IS there anything she can take OTC for the pain? And do you think she should start another antibiotic?

## 2019-10-25 NOTE — TELEPHONE ENCOUNTER
She can only take extra-strength Tylenol at this point or Tylenol Arthritis 2 caplets every 6 hours as the other medications can bother the stomach continue with the antibiotic over the weekend I do not believe other antibiotics will make a difference at this point and could create more side effects in the stomach and digestive tract but contact me by Monday if not finding relief    Also she should check with the Gastroenterology office until she sees the new referral    Her current GI office can make a recommendation as well prior to her next appointment with the new gastro

## 2019-10-28 DIAGNOSIS — E11.42 TYPE 2 DIABETES MELLITUS WITH DIABETIC POLYNEUROPATHY, WITHOUT LONG-TERM CURRENT USE OF INSULIN (HCC): Primary | ICD-10-CM

## 2019-10-28 RX ORDER — LANCETS 28 GAUGE
EACH MISCELLANEOUS
Qty: 100 EACH | Refills: 4 | Status: SHIPPED | OUTPATIENT
Start: 2019-10-28 | End: 2020-01-07

## 2019-11-06 ENCOUNTER — OFFICE VISIT (OUTPATIENT)
Dept: GASTROENTEROLOGY | Facility: CLINIC | Age: 74
End: 2019-11-06
Payer: MEDICARE

## 2019-11-06 VITALS
HEART RATE: 76 BPM | BODY MASS INDEX: 31.19 KG/M2 | DIASTOLIC BLOOD PRESSURE: 62 MMHG | HEIGHT: 61 IN | SYSTOLIC BLOOD PRESSURE: 148 MMHG | WEIGHT: 165.2 LBS

## 2019-11-06 DIAGNOSIS — K21.9 GERD WITHOUT ESOPHAGITIS: Primary | ICD-10-CM

## 2019-11-06 PROCEDURE — 99203 OFFICE O/P NEW LOW 30 MIN: CPT | Performed by: PHYSICIAN ASSISTANT

## 2019-11-06 RX ORDER — PANTOPRAZOLE SODIUM 40 MG/1
40 TABLET, DELAYED RELEASE ORAL DAILY
Qty: 90 TABLET | Refills: 3 | Status: SHIPPED | OUTPATIENT
Start: 2019-11-06 | End: 2021-04-06 | Stop reason: SDUPTHER

## 2019-11-06 NOTE — PATIENT INSTRUCTIONS
Gastroesophageal Reflux Disease   WHAT YOU NEED TO KNOW:   Gastroesophageal reflux occurs when acid and food in the stomach back up into the esophagus  Gastroesophageal reflux disease (GERD) is reflux that occurs more than twice a week for a few weeks  It usually causes heartburn and other symptoms  GERD can cause other health problems over time if it is not treated  DISCHARGE INSTRUCTIONS:   Return to the emergency department if:   · You feel full and cannot burp or vomit  · You have severe chest pain and sudden trouble breathing  · Your bowel movements are black, bloody, or tarry-looking  · Your vomit looks like coffee grounds or has blood in it  Contact your healthcare provider if:   · You vomit large amounts, or you vomit often  · You have trouble breathing after you vomit  · You have trouble swallowing, or pain with swallowing  · You are losing weight without trying  · Your symptoms get worse or do not improve with treatment  · You have questions or concerns about your condition or care  Medicines:   · Medicines  are used to decrease stomach acid  Medicine may also be used to help your lower esophageal sphincter and stomach contract (tighten) more  · Take your medicine as directed  Contact your healthcare provider if you think your medicine is not helping or if you have side effects  Tell him of her if you are allergic to any medicine  Keep a list of the medicines, vitamins, and herbs you take  Include the amounts, and when and why you take them  Bring the list or the pill bottles to follow-up visits  Carry your medicine list with you in case of an emergency  Manage GERD:   · Do not have foods or drinks that may increase heartburn  These include chocolate, peppermint, fried or fatty foods, drinks that contain caffeine, or carbonated drinks (soda)  Other foods include spicy foods, onions, tomatoes, and tomato-based foods   Do not have foods or drinks that can irritate your esophagus, such as citrus fruits, juices, and alcohol  · Do not eat large meals  When you eat a lot of food at one time, your stomach needs more acid to digest it  Eat 6 small meals each day instead of 3 large ones, and eat slowly  Do not eat meals 2 to 3 hours before bedtime  · Elevate the head of your bed  Place 6-inch blocks under the head of your bed frame  You may also use more than one pillow under your head and shoulders while you sleep  · Maintain a healthy weight  If you are overweight, weight loss may help relieve symptoms of GERD  · Do not smoke  Smoking weakens the lower esophageal sphincter and increases the risk of GERD  Ask your healthcare provider for information if you currently smoke and need help to quit  E-cigarettes or smokeless tobacco still contain nicotine  Talk to your healthcare provider before you use these products  · Do not wear clothing that is tight around your waist   Tight clothing can put pressure on your stomach and cause or worsen GERD symptoms  Follow up with your healthcare provider as directed:  Write down your questions so you remember to ask them during your visits  © 2017 [facility] Information is for End User's use only and may not be sold, redistributed or otherwise used for commercial purposes  All illustrations and images included in CareNotes® are the copyrighted property of [employer]  or Nicola Hernández  The above information is an  only  It is not intended as medical advice for individual conditions or treatments  Talk to your doctor, nurse or pharmacist before following any medical regimen to see if it is safe and effective for you

## 2019-11-06 NOTE — PROGRESS NOTES
[facility] - Outpatient Consultation  [facility] 69 y o  female MRN: [id number]  Encounter: [id number]          ASSESSMENT AND PLAN:      1  GERD without esophagitis  GERD handout given  Will represcribed pantoprazole 40 mg daily  No plans for repeat endoscopic evaluation at this time  Colonoscopy in 2021  Will have patient sign a records release to obtain all records  ______________________________________________________________________    HPI:   71-year-old female who is here with acid reflux disease  Patient reports that the end of August she started developed acute onset of right upper quadrant as well as epigastric abdominal pain  She went to see her gastroenterologist who recommended endoscopy  Patient underwent upper endoscopy the beginning of September and she did have evidence of gastritis as well as an irregular Z-line  After the endoscopy was completed her medications were being changed from PPI to PPI  She reports that the combination of the PPIs with her gabapentin were causing her severe diarrhea  She also did 1 month of Carafate therapy  She reports that she was taking Protonix prior to the endoscopy in this actually was helping her symptoms of acid reflux  Patient's endoscopy from 09/06/2019 was reviewed  Patient was told she did not receive a phone call that her pathology was negative  She denies any alarm symptoms at the present time  She does report that she believes that she caused this acute gastritis due to Aleve ingestion  She reports she was taking Aleve daily due to chronic back pain  She reports that she is no longer taking Aleve and is now taking Tylenol  She denies any melena or rectal bleeding  She is up-to-date with her colonoscopy and her next 1 is not due until 2021  REVIEW OF SYSTEMS:    CONSTITUTIONAL: Denies any fever, chills, rigors, and weight loss  HEENT: No earache or tinnitus   Denies hearing loss or visual disturbances  CARDIOVASCULAR: No chest pain or palpitations  RESPIRATORY: Denies any cough, hemoptysis, shortness of breath or dyspnea on exertion  GASTROINTESTINAL: As noted in the History of Present Illness  GENITOURINARY: No problems with urination  Denies any hematuria or dysuria  NEUROLOGIC: No dizziness or vertigo, denies headaches  MUSCULOSKELETAL: Denies any muscle or joint pain  SKIN: Denies skin rashes or itching  ENDOCRINE: Denies excessive thirst  Denies intolerance to heat or cold  PSYCHOSOCIAL: Denies depression or anxiety  Denies any recent memory loss  Historical Information   Past Medical History:   Diagnosis Date    Anxiety     Diabetes mellitus ([address] )     Hypertension     IBS (irritable bowel syndrome)      History reviewed  No pertinent surgical history    Social History   Social History     Substance and Sexual Activity   Alcohol Use Not Currently     Social History     Substance and Sexual Activity   Drug Use Never     Social History     Tobacco Use   Smoking Status Current Every Day Smoker    Packs/day: 1 50   Smokeless Tobacco Never Used     Family History   Problem Relation Age of Onset    Emphysema Mother     COPD Mother     Emphysema Father        Meds/Allergies       Current Outpatient Medications:     buPROPion (WELLBUTRIN XL) 300 mg 24 hr tablet    clonazePAM (KlonoPIN) 1 mg tablet    dexlansoprazole (DEXILANT) 60 MG capsule    FREESTYLE LITE test strip    gabapentin (NEURONTIN) 400 mg capsule    hydrochlorothiazide (HYDRODIURIL) 50 mg tablet    metFORMIN (GLUCOPHAGE-XR) 500 mg 24 hr tablet    pantoprazole (PROTONIX) 40 mg tablet    potassium chloride (K-DUR) 10 mEq tablet    sitaGLIPtin (JANUVIA) 100 mg tablet    sucralfate (CARAFATE) 1 g tablet    Aspirin (ASPIR-81 PO)    Lancets (FREESTYLE) lancets    naproxen sodium (ALEVE) 220 MG tablet    niacin 500 mg ER capsule    Omega-3 Fatty Acids (FISH OIL PO)    pantoprazole (PROTONIX) 40 mg tablet    Allergies   Allergen Reactions    Adhesive [Medical Tape]     Amoxicillin-Pot Clavulanate Hives     Patient states that she is not allergic to this medication   Latex     Paroxetine     Sulfa Antibiotics     Sulfamethoxazole-Trimethoprim Hives           Objective     Blood pressure 148/62, pulse 76, height 5' 1" (1 549 m), weight 74 9 kg (165 lb 3 2 oz)  Body mass index is 31 21 kg/m²  PHYSICAL EXAM:      General Appearance:   Alert, cooperative, no distress   HEENT:   Normocephalic, atraumatic, anicteric      Neck:  Supple, symmetrical, trachea midline   Lungs:   Clear to auscultation bilaterally; no rales, rhonchi or wheezing; respirations unlabored    Heart[de-identified]   Regular rate and rhythm; no murmur, rub, or gallop  Abdomen:   Soft, non-tender, non-distended; normal bowel sounds; no masses, no organomegaly    Genitalia:   Deferred    Rectal:   Deferred    Extremities:  No cyanosis, clubbing or edema    Pulses:  2+ and symmetric    Skin:  No jaundice, rashes, or lesions    Lymph nodes:  No palpable cervical lymphadenopathy        Lab Results:   No visits with results within 1 Day(s) from this visit     Latest known visit with results is:   Appointment on 09/20/2019   Component Date Value    Hepatitis C Ab 09/20/2019 Non-reactive     WBC 09/20/2019 9 25     RBC 09/20/2019 5 15*    Hemoglobin 09/20/2019 15 8*    Hematocrit 09/20/2019 48 8*    MCV 09/20/2019 95     MCH 09/20/2019 30 7     MCHC 09/20/2019 32 4     RDW 09/20/2019 12 9     MPV 09/20/2019 9 9     Platelets 97/94/3941 283     nRBC 09/20/2019 0     Neutrophils Relative 09/20/2019 62     Immat GRANS % 09/20/2019 0     Lymphocytes Relative 09/20/2019 28     Monocytes Relative 09/20/2019 6     Eosinophils Relative 09/20/2019 3     Basophils Relative 09/20/2019 1     Neutrophils Absolute 09/20/2019 5 81     Immature Grans Absolute 09/20/2019 0 01     Lymphocytes Absolute 09/20/2019 2 63     Monocytes Absolute 09/20/2019 0 52     Eosinophils Absolute 09/20/2019 0 23     Basophils Absolute 09/20/2019 0 05     Sodium 09/20/2019 132*    Potassium 09/20/2019 4 0     Chloride 09/20/2019 96*    CO2 09/20/2019 31     ANION GAP 09/20/2019 5     BUN 09/20/2019 10     Creatinine 09/20/2019 0 60     Glucose, Fasting 09/20/2019 140*    Calcium 09/20/2019 10 1     AST 09/20/2019 12     ALT 09/20/2019 15     Alkaline Phosphatase 09/20/2019 59     Total Protein 09/20/2019 7 1     Albumin 09/20/2019 3 8     Total Bilirubin 09/20/2019 0 19*    eGFR 09/20/2019 91     Hemoglobin A1C 09/20/2019 6 8*    EAG 09/20/2019 148     Cholesterol 09/20/2019 186     Triglycerides 09/20/2019 152*    HDL, Direct 09/20/2019 49     LDL Calculated 09/20/2019 107*    Non-HDL-Chol (CHOL-HDL) 09/20/2019 137          Radiology Results:   No results found

## 2019-11-07 ENCOUNTER — CLINICAL SUPPORT (OUTPATIENT)
Dept: FAMILY MEDICINE CLINIC | Facility: CLINIC | Age: 74
End: 2019-11-07
Payer: MEDICARE

## 2019-11-07 ENCOUNTER — TELEPHONE (OUTPATIENT)
Dept: GASTROENTEROLOGY | Facility: CLINIC | Age: 74
End: 2019-11-07

## 2019-11-07 DIAGNOSIS — Z23 ENCOUNTER FOR VACCINATION: Primary | ICD-10-CM

## 2019-11-07 DIAGNOSIS — Z12.31 SCREENING MAMMOGRAM, ENCOUNTER FOR: Primary | ICD-10-CM

## 2019-11-07 PROCEDURE — G0008 ADMIN INFLUENZA VIRUS VAC: HCPCS | Performed by: FAMILY MEDICINE

## 2019-11-07 PROCEDURE — 90662 IIV NO PRSV INCREASED AG IM: CPT | Performed by: FAMILY MEDICINE

## 2019-11-07 NOTE — TELEPHONE ENCOUNTER
Dereck Barton requested medical records from [facility] on 11/6/19    Records have been received via fax - Result given to PA for review

## 2020-01-03 ENCOUNTER — TELEPHONE (OUTPATIENT)
Dept: FAMILY MEDICINE CLINIC | Facility: CLINIC | Age: 75
End: 2020-01-03

## 2020-01-03 NOTE — TELEPHONE ENCOUNTER
Pt needs a RX for potassium chloride (K-DUR) 10meq, but would like capsules instead of tabs due to she doesn't feel like they are doing what they need to, she feels that she can see in in her stool still whole

## 2020-01-07 DIAGNOSIS — E11.42 TYPE 2 DIABETES MELLITUS WITH DIABETIC POLYNEUROPATHY, WITHOUT LONG-TERM CURRENT USE OF INSULIN (HCC): ICD-10-CM

## 2020-01-07 DIAGNOSIS — E11.65 UNCONTROLLED TYPE 2 DIABETES MELLITUS WITH HYPERGLYCEMIA (HCC): ICD-10-CM

## 2020-01-07 RX ORDER — BLOOD-GLUCOSE METER
EACH MISCELLANEOUS 2 TIMES DAILY
Qty: 1 KIT | Refills: 0 | Status: SHIPPED | OUTPATIENT
Start: 2020-01-07

## 2020-01-07 RX ORDER — LANCETS
EACH MISCELLANEOUS
Qty: 100 EACH | Refills: 5 | Status: SHIPPED | OUTPATIENT
Start: 2020-01-07 | End: 2021-02-04

## 2020-01-08 LAB
LEFT EYE DIABETIC RETINOPATHY: NORMAL
RIGHT EYE DIABETIC RETINOPATHY: NORMAL

## 2020-01-08 PROCEDURE — 2023F DILAT RTA XM W/O RTNOPTHY: CPT | Performed by: FAMILY MEDICINE

## 2020-01-08 NOTE — TELEPHONE ENCOUNTER
Patient's potassium level was perfect at 4 0 on her last blood test in September she is due to have more blood work done for her next office visit  I recommend that she remain on the current dosage that she is taking  These types of tablets many of them are time released and the shell of the  tablet remains intact and there is a microscopic hole in the capsule where the potassium is slowly released over 24 hours and the shell of the capsule  - Or tablet-can be seen in the bowel movement   I will discuss it further with her at her next office visit

## 2020-01-09 DIAGNOSIS — I10 ESSENTIAL HYPERTENSION: ICD-10-CM

## 2020-01-09 DIAGNOSIS — F41.9 ANXIETY AND DEPRESSION: ICD-10-CM

## 2020-01-09 DIAGNOSIS — E11.65 UNCONTROLLED TYPE 2 DIABETES MELLITUS WITH HYPERGLYCEMIA (HCC): ICD-10-CM

## 2020-01-09 DIAGNOSIS — F32.A ANXIETY AND DEPRESSION: ICD-10-CM

## 2020-01-09 RX ORDER — CLONAZEPAM 1 MG/1
1 TABLET ORAL EVERY EVENING
Qty: 30 TABLET | Refills: 0 | Status: SHIPPED | OUTPATIENT
Start: 2020-01-09 | End: 2020-03-11

## 2020-01-09 RX ORDER — POTASSIUM CHLORIDE 750 MG/1
TABLET, FILM COATED, EXTENDED RELEASE ORAL
Qty: 180 TABLET | Refills: 1 | Status: SHIPPED | OUTPATIENT
Start: 2020-01-09 | End: 2020-08-10 | Stop reason: SDUPTHER

## 2020-01-09 RX ORDER — METFORMIN HYDROCHLORIDE 500 MG/1
500 TABLET, EXTENDED RELEASE ORAL 2 TIMES DAILY
Qty: 360 TABLET | Refills: 1 | Status: SHIPPED | OUTPATIENT
Start: 2020-01-09 | End: 2020-01-20 | Stop reason: SDUPTHER

## 2020-01-10 ENCOUNTER — APPOINTMENT (OUTPATIENT)
Dept: LAB | Facility: CLINIC | Age: 75
End: 2020-01-10
Payer: COMMERCIAL

## 2020-01-10 DIAGNOSIS — E11.42 TYPE 2 DIABETES MELLITUS WITH DIABETIC POLYNEUROPATHY, WITHOUT LONG-TERM CURRENT USE OF INSULIN (HCC): ICD-10-CM

## 2020-01-10 LAB
ALBUMIN SERPL BCP-MCNC: 3.8 G/DL (ref 3.5–5)
ALP SERPL-CCNC: 54 U/L (ref 46–116)
ALT SERPL W P-5'-P-CCNC: 16 U/L (ref 12–78)
ANION GAP SERPL CALCULATED.3IONS-SCNC: 5 MMOL/L (ref 4–13)
AST SERPL W P-5'-P-CCNC: 12 U/L (ref 5–45)
BASOPHILS # BLD AUTO: 0.06 THOUSANDS/ΜL (ref 0–0.1)
BASOPHILS NFR BLD AUTO: 1 % (ref 0–1)
BILIRUB SERPL-MCNC: 0.37 MG/DL (ref 0.2–1)
BUN SERPL-MCNC: 8 MG/DL (ref 5–25)
CALCIUM SERPL-MCNC: 9.9 MG/DL (ref 8.3–10.1)
CHLORIDE SERPL-SCNC: 99 MMOL/L (ref 100–108)
CHOLEST SERPL-MCNC: 161 MG/DL (ref 50–200)
CO2 SERPL-SCNC: 33 MMOL/L (ref 21–32)
CREAT SERPL-MCNC: 0.6 MG/DL (ref 0.6–1.3)
EOSINOPHIL # BLD AUTO: 0.35 THOUSAND/ΜL (ref 0–0.61)
EOSINOPHIL NFR BLD AUTO: 3 % (ref 0–6)
ERYTHROCYTE [DISTWIDTH] IN BLOOD BY AUTOMATED COUNT: 13.8 % (ref 11.6–15.1)
EST. AVERAGE GLUCOSE BLD GHB EST-MCNC: 131 MG/DL
GFR SERPL CREATININE-BSD FRML MDRD: 91 ML/MIN/1.73SQ M
GLUCOSE P FAST SERPL-MCNC: 120 MG/DL (ref 65–99)
HBA1C MFR BLD: 6.2 % (ref 4.2–6.3)
HCT VFR BLD AUTO: 50.1 % (ref 34.8–46.1)
HDLC SERPL-MCNC: 43 MG/DL
HGB BLD-MCNC: 16 G/DL (ref 11.5–15.4)
IMM GRANULOCYTES # BLD AUTO: 0.02 THOUSAND/UL (ref 0–0.2)
IMM GRANULOCYTES NFR BLD AUTO: 0 % (ref 0–2)
LDLC SERPL CALC-MCNC: 84 MG/DL (ref 0–100)
LYMPHOCYTES # BLD AUTO: 3.04 THOUSANDS/ΜL (ref 0.6–4.47)
LYMPHOCYTES NFR BLD AUTO: 28 % (ref 14–44)
MCH RBC QN AUTO: 30.8 PG (ref 26.8–34.3)
MCHC RBC AUTO-ENTMCNC: 31.9 G/DL (ref 31.4–37.4)
MCV RBC AUTO: 97 FL (ref 82–98)
MONOCYTES # BLD AUTO: 0.55 THOUSAND/ΜL (ref 0.17–1.22)
MONOCYTES NFR BLD AUTO: 5 % (ref 4–12)
NEUTROPHILS # BLD AUTO: 6.69 THOUSANDS/ΜL (ref 1.85–7.62)
NEUTS SEG NFR BLD AUTO: 63 % (ref 43–75)
NONHDLC SERPL-MCNC: 118 MG/DL
NRBC BLD AUTO-RTO: 0 /100 WBCS
PLATELET # BLD AUTO: 284 THOUSANDS/UL (ref 149–390)
PMV BLD AUTO: 9.5 FL (ref 8.9–12.7)
POTASSIUM SERPL-SCNC: 3.9 MMOL/L (ref 3.5–5.3)
PROT SERPL-MCNC: 6.5 G/DL (ref 6.4–8.2)
RBC # BLD AUTO: 5.19 MILLION/UL (ref 3.81–5.12)
SODIUM SERPL-SCNC: 137 MMOL/L (ref 136–145)
TRIGL SERPL-MCNC: 172 MG/DL
TSH SERPL DL<=0.05 MIU/L-ACNC: 2.46 UIU/ML (ref 0.36–3.74)
WBC # BLD AUTO: 10.71 THOUSAND/UL (ref 4.31–10.16)

## 2020-01-10 PROCEDURE — 80053 COMPREHEN METABOLIC PANEL: CPT

## 2020-01-10 PROCEDURE — 84443 ASSAY THYROID STIM HORMONE: CPT

## 2020-01-10 PROCEDURE — 36415 COLL VENOUS BLD VENIPUNCTURE: CPT

## 2020-01-10 PROCEDURE — 83036 HEMOGLOBIN GLYCOSYLATED A1C: CPT

## 2020-01-10 PROCEDURE — 85025 COMPLETE CBC W/AUTO DIFF WBC: CPT

## 2020-01-10 PROCEDURE — 80061 LIPID PANEL: CPT

## 2020-01-17 ENCOUNTER — OFFICE VISIT (OUTPATIENT)
Dept: FAMILY MEDICINE CLINIC | Facility: CLINIC | Age: 75
End: 2020-01-17
Payer: COMMERCIAL

## 2020-01-17 VITALS
HEART RATE: 61 BPM | SYSTOLIC BLOOD PRESSURE: 130 MMHG | BODY MASS INDEX: 31.3 KG/M2 | HEIGHT: 61 IN | DIASTOLIC BLOOD PRESSURE: 70 MMHG | OXYGEN SATURATION: 95 % | WEIGHT: 165.8 LBS

## 2020-01-17 DIAGNOSIS — I10 ESSENTIAL HYPERTENSION: ICD-10-CM

## 2020-01-17 DIAGNOSIS — F32.A ANXIETY AND DEPRESSION: ICD-10-CM

## 2020-01-17 DIAGNOSIS — K21.9 GERD WITHOUT ESOPHAGITIS: ICD-10-CM

## 2020-01-17 DIAGNOSIS — E11.42 TYPE 2 DIABETES MELLITUS WITH DIABETIC POLYNEUROPATHY, WITHOUT LONG-TERM CURRENT USE OF INSULIN (HCC): ICD-10-CM

## 2020-01-17 DIAGNOSIS — F41.9 ANXIETY AND DEPRESSION: ICD-10-CM

## 2020-01-17 DIAGNOSIS — Z23 ENCOUNTER FOR IMMUNIZATION: Primary | ICD-10-CM

## 2020-01-17 PROCEDURE — G0009 ADMIN PNEUMOCOCCAL VACCINE: HCPCS | Performed by: FAMILY MEDICINE

## 2020-01-17 PROCEDURE — 3078F DIAST BP <80 MM HG: CPT | Performed by: FAMILY MEDICINE

## 2020-01-17 PROCEDURE — 3075F SYST BP GE 130 - 139MM HG: CPT | Performed by: FAMILY MEDICINE

## 2020-01-17 PROCEDURE — 90670 PCV13 VACCINE IM: CPT | Performed by: FAMILY MEDICINE

## 2020-01-17 PROCEDURE — 99214 OFFICE O/P EST MOD 30 MIN: CPT | Performed by: FAMILY MEDICINE

## 2020-01-17 RX ORDER — GABAPENTIN 600 MG/1
TABLET ORAL
COMMUNITY
Start: 2019-12-03 | End: 2021-04-06 | Stop reason: SDUPTHER

## 2020-01-17 NOTE — ASSESSMENT & PLAN NOTE
Essential hypertension under good control at this time initially blood pressure elevated 164/86 due to stress and anxiety coffee and pain however after repeat her blood pressure resolved and came down at 663 systolic with the diastolic changing minimally to 84 she will continue with her current medications

## 2020-01-17 NOTE — ASSESSMENT & PLAN NOTE
GERD symptoms continue current medication as directed Dexilant 60 mg follow-up at next appointment see GI as scheduled

## 2020-01-17 NOTE — PROGRESS NOTES
Assessment/Plan:       Problem List Items Addressed This Visit        Digestive    GERD without esophagitis     GERD symptoms continue current medication as directed Dexilant 60 mg follow-up at next appointment see GI as scheduled            Endocrine    Type 2 diabetes mellitus with diabetic polyneuropathy ([facility] [address] )       Lab Results   Component Value Date    HGBA1C 6 2 01/10/2020    Diabetes with A1c stable at 6 2 at this time continue with current regimen monitoring diet exercise and weight and follow up with A1c in 4 months         Relevant Orders    CBC and differential    Comprehensive metabolic panel    Hemoglobin A1C    Lipid panel    Microalbumin / creatinine urine ratio    TSH, 3rd generation with Free T4 reflex       Cardiovascular and Mediastinum    Essential hypertension     Essential hypertension under good control at this time initially blood pressure elevated 164/86 due to stress and anxiety coffee and pain however after repeat her blood pressure resolved and came down at 138 systolic with the diastolic changing minimally to 84 she will continue with her current medications            Other    Anxiety and depression     Generalized anxiety depression continue with clonazepam as needed on a p r n  Basis other medications will remain the same Wellbutrin  mg she is stable sleeping well diet is good interacting with her  and overall no mood changes will continue medication follow-up at next office visit           Other Visit Diagnoses     Encounter for immunization    -  Primary    Relevant Orders    PNEUMOCOCCAL CONJUGATE VACCINE 13-VALENT GREATER THAN 6 MONTHS (Completed)            Subjective:      Patient ID: Javier Novoa is a 69 y o  female  Patient presents today for review of laboratory work and medication refills doing well overall  Stopped Januvia due to insurance change and cost, but now changing her plan        The following portions of the patient's history were reviewed and updated as appropriate: allergies, current medications, past family history, past medical history, past social history, past surgical history and problem list     Review of Systems   Constitutional: Negative for chills, fatigue and fever  HENT: Negative for congestion, nosebleeds, rhinorrhea, sinus pressure and sore throat  Eyes: Negative for discharge and redness  Respiratory: Negative for cough and shortness of breath  Cardiovascular: Negative for chest pain, palpitations and leg swelling  Gastrointestinal: Negative for abdominal pain, blood in stool and nausea  Endocrine: Negative for cold intolerance, heat intolerance and polyuria  Genitourinary: Negative for dysuria and frequency  Musculoskeletal: Negative for arthralgias, back pain and myalgias  Skin: Negative for rash  Neurological: Negative for dizziness, weakness and headaches  Hematological: Negative for adenopathy  Psychiatric/Behavioral: Negative for behavioral problems and sleep disturbance  The patient is not nervous/anxious  Objective:      /70   Pulse 61   Ht 5' 1" (1 549 m)   Wt 75 2 kg (165 lb 12 8 oz)   SpO2 95%   BMI 31 33 kg/m²        Physical Exam   Constitutional: She is oriented to person, place, and time  She appears well-developed and well-nourished  No distress  HENT:   Head: Normocephalic and atraumatic  Right Ear: External ear normal    Left Ear: External ear normal    Nose: Nose normal    Mouth/Throat: Oropharynx is clear and moist  No oropharyngeal exudate  Eyes: Pupils are equal, round, and reactive to light  Conjunctivae and EOM are normal  Right eye exhibits no discharge  Left eye exhibits no discharge  No scleral icterus  Neck: Normal range of motion  No JVD present  No thyromegaly present  Cardiovascular: Normal rate, regular rhythm and normal heart sounds  No murmur heard  Pulmonary/Chest: Effort normal  She has no wheezes  She has no rales  She exhibits no tenderness  Abdominal: Soft  Bowel sounds are normal  She exhibits no distension and no mass  There is no tenderness  Musculoskeletal: Normal range of motion  She exhibits no edema, tenderness or deformity  Lymphadenopathy:     She has no cervical adenopathy  Neurological: She is alert and oriented to person, place, and time  She has normal reflexes  She displays normal reflexes  No cranial nerve deficit  Coordination normal    Skin: Skin is warm and dry  No rash noted  Psychiatric: She has a normal mood and affect  Her behavior is normal  Judgment and thought content normal    Nursing note and vitals reviewed  Data:    Laboratory Results: I have personally reviewed the pertinent laboratory results/reports   Radiology/Other Diagnostic Testing Results: I have personally reviewed pertinent reports         Lab Results   Component Value Date    WBC 10 71 (H) 01/10/2020    HGB 16 0 (H) 01/10/2020    HCT 50 1 (H) 01/10/2020    MCV 97 01/10/2020     01/10/2020     Lab Results   Component Value Date     05/21/2018    K 3 9 01/10/2020    CL 99 (L) 01/10/2020    CO2 33 (H) 01/10/2020    ANIONGAP 14 0 05/21/2018    BUN 8 01/10/2020    CREATININE 0 60 01/10/2020    GLUF 120 (H) 01/10/2020    CALCIUM 9 9 01/10/2020    AST 12 01/10/2020    ALT 16 01/10/2020    ALKPHOS 54 01/10/2020    PROT 6 4 05/21/2018    BILITOT 0 4 05/21/2018    EGFR 91 01/10/2020     Lab Results   Component Value Date    CHOLESTEROL 161 01/10/2020    CHOLESTEROL 186 09/20/2019    CHOLESTEROL 164 05/07/2019     Lab Results   Component Value Date    HDL 43 01/10/2020    HDL 49 09/20/2019    HDL 46 05/07/2019     Lab Results   Component Value Date    LDLCALC 84 01/10/2020    LDLCALC 107 (H) 09/20/2019    LDLCALC 85 05/07/2019     Lab Results   Component Value Date    TRIG 172 (H) 01/10/2020    TRIG 152 (H) 09/20/2019    TRIG 166 (H) 05/07/2019     No results found for: [place], [place]  Lab Results   Component Value Date    SOM6URLPGHRF 2 460 01/10/2020 Lab Results   Component Value Date    HGBA1C 6 2 01/10/2020     No results found for: DOE Jaramillo DO

## 2020-01-17 NOTE — ASSESSMENT & PLAN NOTE
Generalized anxiety depression continue with clonazepam as needed on a p r n   Basis other medications will remain the same Wellbutrin  mg she is stable sleeping well diet is good interacting with her  and overall no mood changes will continue medication follow-up at next office visit

## 2020-01-17 NOTE — PATIENT INSTRUCTIONS
10% - bad control"> 10% - bad control,Hemoglobin A1c (HbA1c) greater than 10% indicating poor diabetic control,Haemoglobin A1c greater than 10% indicating poor diabetic control">   Diabetes Mellitus Type 2 in Adults, Ambulatory Care   GENERAL INFORMATION:   Diabetes mellitus type 2  is a disease that affects how your body uses glucose (sugar)  Insulin helps move sugar out of the blood so it can be used for energy  Normally, when the blood sugar level increases, the pancreas makes more insulin  Type 2 diabetes develops because either the body cannot make enough insulin, or it cannot use the insulin correctly  After many years, your pancreas may stop making insulin  Common symptoms include the following:   · More hunger or thirst than usual     · Frequent urination     · Weight loss without trying     · Blurred vision  Seek immediate care for the following symptoms:   · Severe abdominal pain, or pain that spreads to your back  You may also be vomiting  · Trouble staying awake or focusing    · Shaking or sweating    · Blurred or double vision    · Breath has a fruity, sweet smell    · Breathing is deep and labored, or rapid and shallow    · Heartbeat is fast and weak  Treatment for diabetes mellitus type 2  includes keeping your blood sugar at a normal level  You must eat the right foods, and exercise regularly  You may also need medicine if you cannot control your blood sugar level with nutrition and exercise  Manage diabetes mellitus type 2:   · Check your blood sugar level  You will be taught how to check a small drop of blood in a glucose monitor  Ask your healthcare provider when and how often to check during the day  Ask your healthcare provider what your blood sugar levels should be when you check them  · Keep track of carbohydrates (sugar and starchy foods)  Your blood sugar level can get too high if you eat too many carbohydrates   Your dietitian will help you plan meals and snacks that have the right amount of carbohydrates  · Eat low-fat foods  Some examples are skinless chicken and low-fat milk  · Eat less sodium (salt)  Some examples of high-sodium foods to limit are soy sauce, potato chips, and soup  Do not add salt to food you cook  Limit your use of table salt  · Eat high-fiber foods  Foods that are a good source of fiber include vegetables, whole grain bread, and beans  · Limit alcohol  Alcohol affects your blood sugar level and can make it harder to manage your diabetes  Women should limit alcohol to 1 drink a day  Men should limit alcohol to 2 drinks a day  A drink of alcohol is 12 ounces of beer, 5 ounces of wine, or 1½ ounces of liquor  · Get regular exercise  Exercise can help keep your blood sugar level steady, decrease your risk of heart disease, and help you lose weight  Exercise for at least 30 minutes, 5 days a week  Include muscle strengthening activities 2 days each week  Work with your healthcare provider to create an exercise plan  · Check your feet each day  for injuries or open sores  Ask your healthcare provider for activities you can do if you have an open sore  · Quit smoking  If you smoke, it is never too late to quit  Smoking can worsen the problems that may occur with diabetes  Ask your healthcare provider for information about how to stop smoking if you are having trouble quitting  · Ask about your weight:  Ask healthcare providers if you need to lose weight, and how much to lose  Ask them to help you with a weight loss program  Even a 10 to 15 pound weight loss can help you manage your blood sugar level  · Carry medical alert identification  Wear medical alert jewelry or carry a card that says you have diabetes  Ask your healthcare provider where to get these items  · Ask about vaccines  Diabetes puts you at risk of serious illness if you get the flu, pneumonia, or hepatitis   Ask your healthcare provider if you should get a flu, pneumonia, or hepatitis B vaccine, and when to get the vaccine  Follow up with your healthcare provider as directed:  Write down your questions so you remember to ask them during your visits  CARE AGREEMENT:   You have the right to help plan your care  Learn about your health condition and how it may be treated  Discuss treatment options with your caregivers to decide what care you want to receive  You always have the right to refuse treatment  The above information is an  only  It is not intended as medical advice for individual conditions or treatments  Talk to your doctor, nurse or pharmacist before following any medical regimen to see if it is safe and effective for you  © 2014 [address] is for End User's use only and may not be sold, redistributed or otherwise used for commercial purposes  All illustrations and images included in CareNotes® are the copyrighted property of A D A M [employer]  or Nicola Hernández

## 2020-01-17 NOTE — ASSESSMENT & PLAN NOTE
Lab Results   Component Value Date    HGBA1C 6 2 01/10/2020    Diabetes with A1c stable at 6 2 at this time continue with current regimen monitoring diet exercise and weight and follow up with A1c in 4 months

## 2020-01-20 DIAGNOSIS — E11.65 UNCONTROLLED TYPE 2 DIABETES MELLITUS WITH HYPERGLYCEMIA (HCC): ICD-10-CM

## 2020-01-20 RX ORDER — METFORMIN HYDROCHLORIDE 500 MG/1
500 TABLET, EXTENDED RELEASE ORAL 4 TIMES DAILY
Qty: 120 TABLET | Refills: 1 | Status: SHIPPED | OUTPATIENT
Start: 2020-01-20 | End: 2020-02-24

## 2020-01-30 ENCOUNTER — OFFICE VISIT (OUTPATIENT)
Dept: FAMILY MEDICINE CLINIC | Facility: CLINIC | Age: 75
End: 2020-01-30
Payer: COMMERCIAL

## 2020-01-30 VITALS
WEIGHT: 163.7 LBS | DIASTOLIC BLOOD PRESSURE: 88 MMHG | HEART RATE: 88 BPM | RESPIRATION RATE: 18 BRPM | HEIGHT: 61 IN | TEMPERATURE: 97.4 F | OXYGEN SATURATION: 90 % | BODY MASS INDEX: 30.91 KG/M2 | SYSTOLIC BLOOD PRESSURE: 132 MMHG

## 2020-01-30 DIAGNOSIS — B96.89 ACUTE BACTERIAL BRONCHITIS: Primary | ICD-10-CM

## 2020-01-30 DIAGNOSIS — J20.8 ACUTE BACTERIAL BRONCHITIS: Primary | ICD-10-CM

## 2020-01-30 PROCEDURE — 4040F PNEUMOC VAC/ADMIN/RCVD: CPT | Performed by: NURSE PRACTITIONER

## 2020-01-30 PROCEDURE — 99213 OFFICE O/P EST LOW 20 MIN: CPT | Performed by: NURSE PRACTITIONER

## 2020-01-30 PROCEDURE — 1160F RVW MEDS BY RX/DR IN RCRD: CPT | Performed by: NURSE PRACTITIONER

## 2020-01-30 PROCEDURE — 3008F BODY MASS INDEX DOCD: CPT | Performed by: NURSE PRACTITIONER

## 2020-01-30 RX ORDER — AZITHROMYCIN 250 MG/1
250 TABLET, FILM COATED ORAL DAILY
Qty: 6 TABLET | Refills: 1 | Status: SHIPPED | OUTPATIENT
Start: 2020-01-30 | End: 2020-02-04

## 2020-01-30 RX ORDER — BENZONATATE 100 MG/1
100 CAPSULE ORAL 3 TIMES DAILY PRN
Qty: 20 CAPSULE | Refills: 0 | Status: SHIPPED | OUTPATIENT
Start: 2020-01-30 | End: 2020-02-06

## 2020-01-30 RX ORDER — PREDNISONE 20 MG/1
40 TABLET ORAL DAILY
Qty: 10 TABLET | Refills: 0 | Status: SHIPPED | OUTPATIENT
Start: 2020-01-30 | End: 2021-06-25 | Stop reason: ALTCHOICE

## 2020-01-30 RX ORDER — AZITHROMYCIN 250 MG/1
250 TABLET, FILM COATED ORAL DAILY
Qty: 6 TABLET | Refills: 0 | Status: SHIPPED | OUTPATIENT
Start: 2020-01-30 | End: 2020-01-30 | Stop reason: SDUPTHER

## 2020-01-30 NOTE — ASSESSMENT & PLAN NOTE
Take medication as prescribed, smoking cessation advised  May need lung studies done, most likely has underlying copd

## 2020-01-30 NOTE — PROGRESS NOTES
OFFICE VISIT  Luane Bence 69 y o  female MRN: [id number]          Assessment / Plan:  Problem List Items Addressed This Visit        Respiratory    Acute bacterial bronchitis - Primary     Take medication as prescribed, smoking cessation advised  May need lung studies done, most likely has underlying copd  Relevant Medications    predniSONE 20 mg tablet    benzonatate (TESSALON PERLES) 100 mg capsule    azithromycin (ZITHROMAX) 250 mg tablet            Reason For Visit / Chief Complaint  Chief Complaint   Patient presents with    acute sick visit        HPI:  Luane Bence is a 69 y o  female who presents today for acute sick visit  She reports feeling sick since saturday  She reports a cough, clear phelgm  She has tried mucinex and robitussin  She reports sore ribs from coughing  No fever, no chills, no sore throat, she reports runny nose, headache  She reports no ill contacts at home  Historical Information   Past Medical History:   Diagnosis Date    Anxiety     Diabetes mellitus ([facility] [address] )     Hypertension     IBS (irritable bowel syndrome)      History reviewed  No pertinent surgical history  Social History   Social History     Substance and Sexual Activity   Alcohol Use Not Currently     Social History     Substance and Sexual Activity   Drug Use Never     Social History     Tobacco Use   Smoking Status Current Every Day Smoker    Packs/day: 1 00   Smokeless Tobacco Never Used     Family History   Problem Relation Age of Onset    Emphysema Mother     COPD Mother     Emphysema Father        Meds/Allergies   Allergies   Allergen Reactions    Adhesive [Medical Tape]     Amoxicillin-Pot Clavulanate Hives     Patient states that she is not allergic to this medication      Latex     Paroxetine     Sulfa Antibiotics     Sulfamethoxazole-Trimethoprim Hives       Meds:    Current Outpatient Medications:     Aspirin (ASPIR-81 PO), take one by mouth daily, Disp: , Rfl:     azithromycin (ZITHROMAX) 250 mg tablet, Take 1 tablet (250 mg total) by mouth daily for 5 days 2 pills today, then one daily for 4 more days, Disp: 6 tablet, Rfl: 1    benzonatate (TESSALON PERLES) 100 mg capsule, Take 1 capsule (100 mg total) by mouth 3 (three) times a day as needed for cough for up to 7 days, Disp: 20 capsule, Rfl: 0    Blood Glucose Monitoring Suppl (Margot McDowell) w/Device KIT, by Does not apply route 2 (two) times a day, Disp: 1 kit, Rfl: 0    buPROPion (WELLBUTRIN XL) 300 mg 24 hr tablet, Take 1 tablet (300 mg total) by mouth daily, Disp: 90 tablet, Rfl: 0    clonazePAM (KlonoPIN) 1 mg tablet, Take 1 tablet (1 mg total) by mouth every evening, Disp: 30 tablet, Rfl: 0    dexlansoprazole (DEXILANT) 60 MG capsule, Take 1 capsule (60 mg total) by mouth daily, Disp: 30 capsule, Rfl: 5    gabapentin (NEURONTIN) 600 MG tablet, TAKE 1 TABLET BY MOUTH NIGHTLY, Disp: , Rfl:     glucose blood (ONETOUCH VERIO) test strip, Test blood glucose twice daily, Disp: 100 each, Rfl: 5    hydrochlorothiazide (HYDRODIURIL) 50 mg tablet, TAKE ONE TABLET BY MOUTH EVERY DAY, Disp: 90 tablet, Rfl: 3    Lancets (ONETOUCH ULTRASOFT) lancets, Test blood glucose twice daily, Disp: 100 each, Rfl: 5    metFORMIN (GLUCOPHAGE-XR) 500 mg 24 hr tablet, Take 1 tablet (500 mg total) by mouth 4 (four) times a day, Disp: 120 tablet, Rfl: 1    naproxen sodium (ALEVE) 220 MG tablet, Take 220 mg by mouth, Disp: , Rfl:     niacin 500 mg ER capsule, Take 500 mg by mouth daily at bedtime, Disp: , Rfl:     Omega-3 Fatty Acids (FISH OIL PO), take one tab/cap by mouth daily, Disp: , Rfl:     pantoprazole (PROTONIX) 40 mg tablet, Take 1 tablet (40 mg total) by mouth daily, Disp: 90 tablet, Rfl: 3    potassium chloride (K-DUR) 10 mEq tablet, TAKE 2 TABLETS BY MOUTH ONCE DAILY, Disp: 180 tablet, Rfl: 1    predniSONE 20 mg tablet, Take 2 tablets (40 mg total) by mouth daily, Disp: 10 tablet, Rfl: 0    sitaGLIPtin (JANUVIA) 100 mg tablet, Take 1 tablet (100 mg total) by mouth daily, Disp: 30 tablet, Rfl: 3    sucralfate (CARAFATE) 1 g tablet, Take 1 g by mouth 4 (four) times a day, Disp: , Rfl:       REVIEW OF SYSTEMS  Review of Systems   Constitutional: Negative for activity change, chills, fatigue and fever  HENT: Negative for congestion, ear discharge, ear pain, sinus pressure, sinus pain, sore throat, tinnitus and trouble swallowing  Eyes: Negative for photophobia, pain, discharge, itching and visual disturbance  Respiratory: Positive for cough  Negative for chest tightness, shortness of breath and wheezing  Cardiovascular: Negative for chest pain and leg swelling  Gastrointestinal: Negative for abdominal distention, abdominal pain, constipation, diarrhea, nausea and vomiting  Endocrine: Negative for polydipsia, polyphagia and polyuria  Genitourinary: Negative for dysuria and frequency  Musculoskeletal: Negative for arthralgias, myalgias, neck pain and neck stiffness  Skin: Negative for color change  Neurological: Negative for dizziness, syncope, weakness, numbness and headaches  Hematological: Does not bruise/bleed easily  Psychiatric/Behavioral: Negative for behavioral problems, confusion, self-injury, sleep disturbance and suicidal ideas  The patient is not nervous/anxious  Current Vitals:   Blood Pressure: 132/88 (01/30/20 1349)  Pulse: 88 (01/30/20 1349)  Temperature: (!) 97 4 °F (36 3 °C) (01/30/20 1349)  Respirations: 18 (01/30/20 1349)  Height: 5' 1" (154 9 cm) (01/30/20 1349)  Weight - Scale: 74 3 kg (163 lb 11 2 oz) (01/30/20 1349)  SpO2: 90 % (01/30/20 1349)  [unfilled]    PHYSICAL EXAMS:  Physical Exam   Constitutional: She is oriented to person, place, and time  She appears well-developed and well-nourished  HENT:   Head: Normocephalic     Right Ear: External ear normal    Left Ear: External ear normal    Mouth/Throat: Oropharynx is clear and moist    Eyes: Pupils are equal, round, and reactive to light  Conjunctivae are normal    Neck: Neck supple  Cardiovascular: Normal rate and regular rhythm  Pulmonary/Chest: Effort normal  She has decreased breath sounds  She has wheezes  Abdominal: Soft  Bowel sounds are normal  She exhibits no distension  There is no tenderness  Musculoskeletal: Normal range of motion  Neurological: She is alert and oriented to person, place, and time  Skin: Skin is warm and dry  Psychiatric: She has a normal mood and affect  Lab, imaging and other studies: I have personally reviewed pertinent reports  Selena Barry

## 2020-02-03 ENCOUNTER — TELEPHONE (OUTPATIENT)
Dept: FAMILY MEDICINE CLINIC | Facility: CLINIC | Age: 75
End: 2020-02-03

## 2020-02-03 DIAGNOSIS — J20.8 ACUTE BACTERIAL BRONCHITIS: Primary | ICD-10-CM

## 2020-02-03 DIAGNOSIS — B96.89 ACUTE BACTERIAL BRONCHITIS: Primary | ICD-10-CM

## 2020-02-03 RX ORDER — FLUTICASONE FUROATE AND VILANTEROL 100; 25 UG/1; UG/1
1 POWDER RESPIRATORY (INHALATION) DAILY
Qty: 1 INHALER | Refills: 1 | Status: SHIPPED | OUTPATIENT
Start: 2020-02-03 | End: 2020-02-17 | Stop reason: SDUPTHER

## 2020-02-03 NOTE — TELEPHONE ENCOUNTER
Notify patient that I would like her to start using the Breo inhaler I sent into the pharmacy this is a once daily inhaler that will help with cough and reduce mucus production the antibiotic that was given last week is now at the end of its 5 day course and will continue to work for an additional 5 days because it is the [place]  Before I suggest or start another antibiotic this may take care of the problem or it may be a viral etiology    Additionally she can come in this week we have openings Wednesday or Thursday or Friday if not improving or if she would like to schedule with me ,  Before changing to a different antibiotic altogether

## 2020-02-03 NOTE — TELEPHONE ENCOUNTER
Patient was seen with Myrna on 1/30/20  Still coughing  Think mucus  Not getting better  Requesting another antibiotic

## 2020-02-14 ENCOUNTER — TELEPHONE (OUTPATIENT)
Dept: FAMILY MEDICINE CLINIC | Facility: CLINIC | Age: 75
End: 2020-02-14

## 2020-02-14 DIAGNOSIS — B96.89 ACUTE BACTERIAL BRONCHITIS: Primary | ICD-10-CM

## 2020-02-14 DIAGNOSIS — J20.8 ACUTE BACTERIAL BRONCHITIS: Primary | ICD-10-CM

## 2020-02-14 RX ORDER — DEXTROMETHORPHAN HYDROBROMIDE AND PROMETHAZINE HYDROCHLORIDE 15; 6.25 MG/5ML; MG/5ML
10 SOLUTION ORAL 3 TIMES DAILY PRN
Qty: 240 ML | Refills: 1 | Status: SHIPPED | OUTPATIENT
Start: 2020-02-14 | End: 2020-02-17 | Stop reason: ALTCHOICE

## 2020-02-14 RX ORDER — AZITHROMYCIN 250 MG/1
TABLET, FILM COATED ORAL
Qty: 6 TABLET | Refills: 0 | Status: SHIPPED | OUTPATIENT
Start: 2020-02-14 | End: 2020-02-19

## 2020-02-14 NOTE — TELEPHONE ENCOUNTER
Ask patient to refill the antibiotic as sent 1 in now and take an additional cough medicine continue with the inhaler contact me in come in next week this is the 2nd treatment I have given her since she was here    The last phone call came in last week for the inhaler and that is not helping often these cough problems can last despite the treatments as long as she is not running a fever but if she feels worse she needs to go get a chest x-ray

## 2020-02-14 NOTE — TELEPHONE ENCOUNTER
Pt was in to see Myrna and the inhaler doesn't seem to be working and she can't get rid of her cough and is congested  Can you send something in for her?

## 2020-02-17 ENCOUNTER — OFFICE VISIT (OUTPATIENT)
Dept: FAMILY MEDICINE CLINIC | Facility: CLINIC | Age: 75
End: 2020-02-17
Payer: COMMERCIAL

## 2020-02-17 VITALS
SYSTOLIC BLOOD PRESSURE: 136 MMHG | DIASTOLIC BLOOD PRESSURE: 78 MMHG | OXYGEN SATURATION: 96 % | HEIGHT: 61 IN | WEIGHT: 161.8 LBS | HEART RATE: 86 BPM | TEMPERATURE: 99.8 F | BODY MASS INDEX: 30.55 KG/M2

## 2020-02-17 DIAGNOSIS — E11.42 TYPE 2 DIABETES MELLITUS WITH DIABETIC POLYNEUROPATHY, WITHOUT LONG-TERM CURRENT USE OF INSULIN (HCC): ICD-10-CM

## 2020-02-17 DIAGNOSIS — F32.A ANXIETY AND DEPRESSION: ICD-10-CM

## 2020-02-17 DIAGNOSIS — I10 ESSENTIAL HYPERTENSION: ICD-10-CM

## 2020-02-17 DIAGNOSIS — B96.89 ACUTE BACTERIAL BRONCHITIS: ICD-10-CM

## 2020-02-17 DIAGNOSIS — F41.9 ANXIETY AND DEPRESSION: ICD-10-CM

## 2020-02-17 DIAGNOSIS — J45.21 MILD INTERMITTENT REACTIVE AIRWAY DISEASE WITH ACUTE EXACERBATION: ICD-10-CM

## 2020-02-17 DIAGNOSIS — J20.8 ACUTE BACTERIAL BRONCHITIS: ICD-10-CM

## 2020-02-17 DIAGNOSIS — K21.9 GERD WITHOUT ESOPHAGITIS: Primary | ICD-10-CM

## 2020-02-17 DIAGNOSIS — R05.9 COUGH: ICD-10-CM

## 2020-02-17 PROBLEM — J45.901 REACTIVE AIRWAY DISEASE WITH ACUTE EXACERBATION: Status: ACTIVE | Noted: 2020-02-17

## 2020-02-17 PROBLEM — R07.89 CHEST WALL PAIN: Status: ACTIVE | Noted: 2020-02-17

## 2020-02-17 PROCEDURE — 3078F DIAST BP <80 MM HG: CPT | Performed by: FAMILY MEDICINE

## 2020-02-17 PROCEDURE — 2022F DILAT RTA XM EVC RTNOPTHY: CPT | Performed by: FAMILY MEDICINE

## 2020-02-17 PROCEDURE — 3044F HG A1C LEVEL LT 7.0%: CPT | Performed by: FAMILY MEDICINE

## 2020-02-17 PROCEDURE — 3008F BODY MASS INDEX DOCD: CPT | Performed by: FAMILY MEDICINE

## 2020-02-17 PROCEDURE — 4040F PNEUMOC VAC/ADMIN/RCVD: CPT | Performed by: FAMILY MEDICINE

## 2020-02-17 PROCEDURE — 1160F RVW MEDS BY RX/DR IN RCRD: CPT | Performed by: FAMILY MEDICINE

## 2020-02-17 PROCEDURE — 99214 OFFICE O/P EST MOD 30 MIN: CPT | Performed by: FAMILY MEDICINE

## 2020-02-17 PROCEDURE — 3075F SYST BP GE 130 - 139MM HG: CPT | Performed by: FAMILY MEDICINE

## 2020-02-17 RX ORDER — FLUTICASONE FUROATE AND VILANTEROL 100; 25 UG/1; UG/1
POWDER RESPIRATORY (INHALATION)
Qty: 1 INHALER | Refills: 1 | Status: SHIPPED | OUTPATIENT
Start: 2020-02-17 | End: 2021-06-25 | Stop reason: HOSPADM

## 2020-02-17 RX ORDER — HYDROCODONE POLISTIREX AND CHLORPHENIRAMINE POLISTIREX 10; 8 MG/5ML; MG/5ML
5 SUSPENSION, EXTENDED RELEASE ORAL EVERY 12 HOURS PRN
Qty: 120 ML | Refills: 0 | Status: SHIPPED | OUTPATIENT
Start: 2020-02-17 | End: 2020-03-16 | Stop reason: SDUPTHER

## 2020-02-17 NOTE — ASSESSMENT & PLAN NOTE
Hypertension initially elevated she needs to monitor her blood pressure in light of the cough medications and decongestants started at this point

## 2020-02-17 NOTE — ASSESSMENT & PLAN NOTE
Reactive airway disease at this time with continue with cough keeping her awake at night and giving her a sore throat headache and chest pain from the chest wall  At this point tests in X will be given as a cough suppressant she will take Sudafed 12 hour tablets and I will increase her Breo inhaler twice daily if she will contact me by the end of the week if no improvement her symptoms are from viral etiology    She completed her 2 courses of antibiotics

## 2020-02-17 NOTE — ASSESSMENT & PLAN NOTE
Patient has a persistent nonproductive cough now with sore throat chest pain as a result in addition to headaches additional medication will be prescribed Tussionex at this time she will increase her Breo inhaler twice daily she has reactive airway disease as a result and pharyngitis    She will contact me if not improved by 4-5 days this week

## 2020-02-17 NOTE — ASSESSMENT & PLAN NOTE
Bronchitis resolving at this point she will continue with her current regimen and I will add Tussionex cough medication along with increasing the Breo inhaler twice daily now and she will add Sudafed 12 hour tablets

## 2020-02-17 NOTE — ASSESSMENT & PLAN NOTE
Chest wall pain as a result of coughing  Patient reassured and will continue medications and call in 3 days if not improved

## 2020-02-17 NOTE — PROGRESS NOTES
Assessment/Plan:       Problem List Items Addressed This Visit        Digestive    GERD without esophagitis - Primary       Endocrine    Type 2 diabetes mellitus with diabetic polyneuropathy (HCC)       Respiratory    Acute bacterial bronchitis     Bronchitis resolving at this point she will continue with her current regimen and I will add Tussionex cough medication along with increasing the Breo inhaler twice daily now and she will add Sudafed 12 hour tablets         Relevant Medications    hydrocodone-chlorpheniramine polistirex (TUSSIONEX) 10-8 mg/5 mL ER suspension    fluticasone-vilanterol (BREO ELLIPTA) 100-25 mcg/inh inhaler    Reactive airway disease with acute exacerbation     Reactive airway disease at this time with continue with cough keeping her awake at night and giving her a sore throat headache and chest pain from the chest wall  At this point tests in X will be given as a cough suppressant she will take Sudafed 12 hour tablets and I will increase her Breo inhaler twice daily if she will contact me by the end of the week if no improvement her symptoms are from viral etiology  She completed her 2 courses of antibiotics            Cardiovascular and Mediastinum    Essential hypertension     Hypertension initially elevated she needs to monitor her blood pressure in light of the cough medications and decongestants started at this point            Other    Anxiety and depression    Cough     Patient has a persistent nonproductive cough now with sore throat chest pain as a result in addition to headaches additional medication will be prescribed Tussionex at this time she will increase her Breo inhaler twice daily she has reactive airway disease as a result and pharyngitis  She will contact me if not improved by 4-5 days this week                 Subjective:      Patient ID: Barbie Pinto is a 69 y o  female      Patient presents for ongoing continuous cough not relieved by recent treatment plan after 2 courses of Zithromax antibiotics a Breo inhaler and promethazine DM along with Tessalon Perle she has a sore throat chest pain and headaches from continue with coughing keeping her awake at night  The following portions of the patient's history were reviewed and updated as appropriate: allergies, current medications, past family history, past medical history, past social history, past surgical history and problem list     Review of Systems   Constitutional: Negative for chills, fatigue and fever  HENT: Positive for postnasal drip, rhinorrhea and sore throat  Negative for congestion, nosebleeds and sinus pressure  Eyes: Negative for discharge and redness  Respiratory: Negative for cough and shortness of breath  Cardiovascular: Positive for chest pain  Negative for palpitations and leg swelling  Gastrointestinal: Negative for abdominal pain, blood in stool and nausea  Endocrine: Negative for cold intolerance, heat intolerance and polyuria  Genitourinary: Negative for dysuria and frequency  Musculoskeletal: Negative for arthralgias, back pain and myalgias  Skin: Negative for rash  Neurological: Positive for headaches  Negative for dizziness and weakness  Hematological: Negative for adenopathy  Psychiatric/Behavioral: Negative for behavioral problems and sleep disturbance  The patient is not nervous/anxious  Objective:      /78   Pulse 86   Temp 99 8 °F (37 7 °C)   Ht 5' 1" (1 549 m)   Wt 73 4 kg (161 lb 12 8 oz)   SpO2 96%   BMI 30 57 kg/m²        Physical Exam   Constitutional: She is oriented to person, place, and time  She appears well-developed and well-nourished  No distress  HENT:   Head: Normocephalic and atraumatic  Right Ear: External ear normal    Left Ear: External ear normal    Nose: Nose normal    Mouth/Throat: Oropharynx is clear and moist  No oropharyngeal exudate  Eyes: Pupils are equal, round, and reactive to light   Conjunctivae and EOM are normal  Right eye exhibits no discharge  Left eye exhibits no discharge  No scleral icterus  Neck: Normal range of motion  No JVD present  No thyromegaly present  Cardiovascular: Normal rate, regular rhythm and normal heart sounds  No murmur heard  Pulmonary/Chest: Effort normal  Stridor present  She has wheezes  She has no rales  She exhibits no tenderness  Abdominal: Soft  Bowel sounds are normal  She exhibits no distension and no mass  There is no tenderness  Musculoskeletal: Normal range of motion  She exhibits no edema, tenderness or deformity  Lymphadenopathy:     She has no cervical adenopathy  Neurological: She is alert and oriented to person, place, and time  She has normal reflexes  She displays normal reflexes  No cranial nerve deficit  Coordination normal    Skin: Skin is warm and dry  No rash noted  Psychiatric: She has a normal mood and affect  Her behavior is normal  Judgment and thought content normal    Nursing note and vitals reviewed  Data:    Laboratory Results: I have personally reviewed the pertinent laboratory results/reports   Radiology/Other Diagnostic Testing Results: I have personally reviewed pertinent reports         Lab Results   Component Value Date    WBC 10 71 (H) 01/10/2020    HGB 16 0 (H) 01/10/2020    HCT 50 1 (H) 01/10/2020    MCV 97 01/10/2020     01/10/2020     Lab Results   Component Value Date     05/21/2018    K 3 9 01/10/2020    CL 99 (L) 01/10/2020    CO2 33 (H) 01/10/2020    ANIONGAP 14 0 05/21/2018    BUN 8 01/10/2020    CREATININE 0 60 01/10/2020    GLUF 120 (H) 01/10/2020    CALCIUM 9 9 01/10/2020    AST 12 01/10/2020    ALT 16 01/10/2020    ALKPHOS 54 01/10/2020    PROT 6 4 05/21/2018    BILITOT 0 4 05/21/2018    EGFR 91 01/10/2020     Lab Results   Component Value Date    CHOLESTEROL 161 01/10/2020    CHOLESTEROL 186 09/20/2019    CHOLESTEROL 164 05/07/2019     Lab Results   Component Value Date    HDL 43 01/10/2020    HDL 49 09/20/2019    HDL 46 05/07/2019     Lab Results   Component Value Date    LDLCALC 84 01/10/2020    LDLCALC 107 (H) 09/20/2019    LDLCALC 85 05/07/2019     Lab Results   Component Value Date    TRIG 172 (H) 01/10/2020    TRIG 152 (H) 09/20/2019    TRIG 166 (H) 05/07/2019     No results found for: [place], [place]  Lab Results   Component Value Date    EBY7XUUZGLPW 2 460 01/10/2020     Lab Results   Component Value Date    HGBA1C 6 2 01/10/2020     No results found for: DOE Jaramillo, DO

## 2020-02-17 NOTE — PATIENT INSTRUCTIONS
Acute Cough   AMBULATORY CARE:   An acute cough  can last up to 3 weeks  Common causes of an acute cough include a cold, allergies, or a lung infection  Seek care immediately if:   · You have trouble breathing or feel short of breath  · You cough up blood, or you see blood in your mucus  · You faint or feel weak or dizzy  · You have chest pain when you cough or take a deep breath  · You have new wheezing  Contact your healthcare provider if:   · You have a fever  · Your cough lasts longer than 4 weeks  · Your symptoms do not improve with treatment  · You have questions or concerns about your condition or care  Treatment:  An acute cough usually goes away on its own  Ask your healthcare provider about medicines you can take to decrease your cough  You may need medicine to stop the cough, decrease swelling in your airways, or help open your airways  Medicine may also be given to help you cough up mucus  If you have an infection caused by bacteria, you may need antibiotics  Manage your symptoms:   · Do not smoke and stay away from others who smoke  Nicotine and other chemicals in cigarettes and cigars can cause lung damage and make your cough worse  Ask your healthcare provider for information if you currently smoke and need help to quit  E-cigarettes or smokeless tobacco still contain nicotine  Talk to your healthcare provider before you use these products  · Drink extra liquids as directed  Liquids will help thin and loosen mucus so you can cough it up  Liquids will also help prevent dehydration  Examples of good liquids to drink include water, fruit juice, and broth  Do not drink liquids that contain caffeine  Caffeine can increase your risk for dehydration  Ask your healthcare provider how much liquid to drink each day  · Rest as directed  Do not do activities that make your cough worse, such as exercise  · Use a humidifier or vaporizer    Use a cool mist humidifier or a vaporizer to increase air moisture in your home  This may make it easier for you to breathe and help decrease your cough  · Eat 2 to 5 mL of honey 2 times each day  Honey can help thin mucus and decrease your cough  · Use cough drops or lozenges  These can help decrease throat irritation and your cough  Follow up with your healthcare provider as directed:  Write down your questions so you remember to ask them during your visits  © 2017 [facility] Information is for End User's use only and may not be sold, redistributed or otherwise used for commercial purposes  All illustrations and images included in CareNotes® are the copyrighted property of A D A M [employer] Inc  or Nicola Hernández  The above information is an  only  It is not intended as medical advice for individual conditions or treatments  Talk to your doctor, nurse or pharmacist before following any medical regimen to see if it is safe and effective for you

## 2020-02-23 DIAGNOSIS — F41.9 ANXIETY AND DEPRESSION: ICD-10-CM

## 2020-02-23 DIAGNOSIS — E11.65 UNCONTROLLED TYPE 2 DIABETES MELLITUS WITH HYPERGLYCEMIA (HCC): ICD-10-CM

## 2020-02-23 DIAGNOSIS — F32.A ANXIETY AND DEPRESSION: ICD-10-CM

## 2020-02-24 DIAGNOSIS — E11.65 UNCONTROLLED TYPE 2 DIABETES MELLITUS WITH HYPERGLYCEMIA (HCC): ICD-10-CM

## 2020-02-24 RX ORDER — METFORMIN HYDROCHLORIDE 500 MG/1
500 TABLET, EXTENDED RELEASE ORAL 2 TIMES DAILY
Qty: 120 TABLET | Refills: 1 | Status: SHIPPED | OUTPATIENT
Start: 2020-02-24 | End: 2020-08-10 | Stop reason: SDUPTHER

## 2020-02-24 RX ORDER — METFORMIN HYDROCHLORIDE 500 MG/1
TABLET, EXTENDED RELEASE ORAL
Qty: 360 TABLET | Refills: 1 | Status: SHIPPED | OUTPATIENT
Start: 2020-02-24 | End: 2020-08-10 | Stop reason: DRUGHIGH

## 2020-02-24 RX ORDER — BUPROPION HYDROCHLORIDE 300 MG/1
TABLET ORAL
Qty: 90 TABLET | Refills: 0 | Status: SHIPPED | OUTPATIENT
Start: 2020-02-24 | End: 2020-06-03 | Stop reason: SDUPTHER

## 2020-03-11 DIAGNOSIS — F32.A ANXIETY AND DEPRESSION: ICD-10-CM

## 2020-03-11 DIAGNOSIS — F41.9 ANXIETY AND DEPRESSION: ICD-10-CM

## 2020-03-11 RX ORDER — CLONAZEPAM 1 MG/1
TABLET ORAL
Qty: 30 TABLET | Refills: 0 | Status: SHIPPED | OUTPATIENT
Start: 2020-03-11 | End: 2020-04-17

## 2020-03-16 ENCOUNTER — TELEPHONE (OUTPATIENT)
Dept: FAMILY MEDICINE CLINIC | Facility: CLINIC | Age: 75
End: 2020-03-16

## 2020-03-16 DIAGNOSIS — J20.8 ACUTE BACTERIAL BRONCHITIS: ICD-10-CM

## 2020-03-16 DIAGNOSIS — B96.89 ACUTE BACTERIAL BRONCHITIS: ICD-10-CM

## 2020-03-16 RX ORDER — HYDROCODONE POLISTIREX AND CHLORPHENIRAMINE POLISTIREX 10; 8 MG/5ML; MG/5ML
5 SUSPENSION, EXTENDED RELEASE ORAL EVERY 12 HOURS PRN
Qty: 120 ML | Refills: 0 | Status: SHIPPED | OUTPATIENT
Start: 2020-03-16 | End: 2020-04-06 | Stop reason: SDUPTHER

## 2020-03-16 NOTE — TELEPHONE ENCOUNTER
Pt asking for a refill on the cough medicine, she still has the cough from the last time she was here  She still has no fever

## 2020-04-06 DIAGNOSIS — J20.8 ACUTE BACTERIAL BRONCHITIS: ICD-10-CM

## 2020-04-06 DIAGNOSIS — B96.89 ACUTE BACTERIAL BRONCHITIS: ICD-10-CM

## 2020-04-06 RX ORDER — HYDROCODONE POLISTIREX AND CHLORPHENIRAMINE POLISTIREX 10; 8 MG/5ML; MG/5ML
5 SUSPENSION, EXTENDED RELEASE ORAL EVERY 12 HOURS PRN
Qty: 120 ML | Refills: 0 | Status: SHIPPED | OUTPATIENT
Start: 2020-04-06 | End: 2021-06-25 | Stop reason: HOSPADM

## 2020-04-17 DIAGNOSIS — F41.9 ANXIETY AND DEPRESSION: ICD-10-CM

## 2020-04-17 DIAGNOSIS — F32.A ANXIETY AND DEPRESSION: ICD-10-CM

## 2020-04-17 RX ORDER — CLONAZEPAM 1 MG/1
TABLET ORAL
Qty: 30 TABLET | Refills: 0 | Status: SHIPPED | OUTPATIENT
Start: 2020-04-17 | End: 2020-05-12 | Stop reason: SDUPTHER

## 2020-05-06 ENCOUNTER — TELEPHONE (OUTPATIENT)
Dept: FAMILY MEDICINE CLINIC | Facility: CLINIC | Age: 75
End: 2020-05-06

## 2020-05-11 ENCOUNTER — APPOINTMENT (OUTPATIENT)
Dept: LAB | Facility: CLINIC | Age: 75
End: 2020-05-11
Payer: COMMERCIAL

## 2020-05-11 DIAGNOSIS — E11.42 TYPE 2 DIABETES MELLITUS WITH DIABETIC POLYNEUROPATHY, WITHOUT LONG-TERM CURRENT USE OF INSULIN (HCC): ICD-10-CM

## 2020-05-11 LAB
ALBUMIN SERPL BCP-MCNC: 3.9 G/DL (ref 3.5–5)
ALP SERPL-CCNC: 52 U/L (ref 46–116)
ALT SERPL W P-5'-P-CCNC: 16 U/L (ref 12–78)
ANION GAP SERPL CALCULATED.3IONS-SCNC: 5 MMOL/L (ref 4–13)
AST SERPL W P-5'-P-CCNC: 14 U/L (ref 5–45)
BASOPHILS # BLD AUTO: 0.05 THOUSANDS/ΜL (ref 0–0.1)
BASOPHILS NFR BLD AUTO: 1 % (ref 0–1)
BILIRUB SERPL-MCNC: 0.24 MG/DL (ref 0.2–1)
BUN SERPL-MCNC: 11 MG/DL (ref 5–25)
CALCIUM SERPL-MCNC: 9.4 MG/DL (ref 8.3–10.1)
CHLORIDE SERPL-SCNC: 99 MMOL/L (ref 100–108)
CHOLEST SERPL-MCNC: 200 MG/DL (ref 50–200)
CO2 SERPL-SCNC: 32 MMOL/L (ref 21–32)
CREAT SERPL-MCNC: 0.51 MG/DL (ref 0.6–1.3)
CREAT UR-MCNC: 34.5 MG/DL
EOSINOPHIL # BLD AUTO: 0.28 THOUSAND/ΜL (ref 0–0.61)
EOSINOPHIL NFR BLD AUTO: 3 % (ref 0–6)
ERYTHROCYTE [DISTWIDTH] IN BLOOD BY AUTOMATED COUNT: 13.6 % (ref 11.6–15.1)
EST. AVERAGE GLUCOSE BLD GHB EST-MCNC: 126 MG/DL
GFR SERPL CREATININE-BSD FRML MDRD: 96 ML/MIN/1.73SQ M
GLUCOSE SERPL-MCNC: 122 MG/DL (ref 65–140)
HBA1C MFR BLD: 6 %
HCT VFR BLD AUTO: 47.4 % (ref 34.8–46.1)
HDLC SERPL-MCNC: 52 MG/DL
HGB BLD-MCNC: 15.6 G/DL (ref 11.5–15.4)
IMM GRANULOCYTES # BLD AUTO: 0.01 THOUSAND/UL (ref 0–0.2)
IMM GRANULOCYTES NFR BLD AUTO: 0 % (ref 0–2)
LDLC SERPL CALC-MCNC: 111 MG/DL (ref 0–100)
LYMPHOCYTES # BLD AUTO: 2.96 THOUSANDS/ΜL (ref 0.6–4.47)
LYMPHOCYTES NFR BLD AUTO: 33 % (ref 14–44)
MCH RBC QN AUTO: 31.3 PG (ref 26.8–34.3)
MCHC RBC AUTO-ENTMCNC: 32.9 G/DL (ref 31.4–37.4)
MCV RBC AUTO: 95 FL (ref 82–98)
MICROALBUMIN UR-MCNC: 47.5 MG/L (ref 0–20)
MICROALBUMIN/CREAT 24H UR: 138 MG/G CREATININE (ref 0–30)
MONOCYTES # BLD AUTO: 0.5 THOUSAND/ΜL (ref 0.17–1.22)
MONOCYTES NFR BLD AUTO: 6 % (ref 4–12)
NEUTROPHILS # BLD AUTO: 5.06 THOUSANDS/ΜL (ref 1.85–7.62)
NEUTS SEG NFR BLD AUTO: 57 % (ref 43–75)
NONHDLC SERPL-MCNC: 148 MG/DL
NRBC BLD AUTO-RTO: 0 /100 WBCS
PLATELET # BLD AUTO: 295 THOUSANDS/UL (ref 149–390)
PMV BLD AUTO: 10.3 FL (ref 8.9–12.7)
POTASSIUM SERPL-SCNC: 3.3 MMOL/L (ref 3.5–5.3)
PROT SERPL-MCNC: 7 G/DL (ref 6.4–8.2)
RBC # BLD AUTO: 4.99 MILLION/UL (ref 3.81–5.12)
SODIUM SERPL-SCNC: 136 MMOL/L (ref 136–145)
TRIGL SERPL-MCNC: 186 MG/DL
TSH SERPL DL<=0.05 MIU/L-ACNC: 2.21 UIU/ML (ref 0.36–3.74)
WBC # BLD AUTO: 8.86 THOUSAND/UL (ref 4.31–10.16)

## 2020-05-11 PROCEDURE — 3060F POS MICROALBUMINURIA REV: CPT | Performed by: FAMILY MEDICINE

## 2020-05-11 PROCEDURE — 84443 ASSAY THYROID STIM HORMONE: CPT

## 2020-05-11 PROCEDURE — 85025 COMPLETE CBC W/AUTO DIFF WBC: CPT

## 2020-05-11 PROCEDURE — 82570 ASSAY OF URINE CREATININE: CPT | Performed by: FAMILY MEDICINE

## 2020-05-11 PROCEDURE — 80053 COMPREHEN METABOLIC PANEL: CPT

## 2020-05-11 PROCEDURE — 83036 HEMOGLOBIN GLYCOSYLATED A1C: CPT

## 2020-05-11 PROCEDURE — 3044F HG A1C LEVEL LT 7.0%: CPT | Performed by: FAMILY MEDICINE

## 2020-05-11 PROCEDURE — 80061 LIPID PANEL: CPT

## 2020-05-11 PROCEDURE — 36415 COLL VENOUS BLD VENIPUNCTURE: CPT

## 2020-05-11 PROCEDURE — 82043 UR ALBUMIN QUANTITATIVE: CPT | Performed by: FAMILY MEDICINE

## 2020-05-12 ENCOUNTER — TELEMEDICINE (OUTPATIENT)
Dept: FAMILY MEDICINE CLINIC | Facility: CLINIC | Age: 75
End: 2020-05-12
Payer: COMMERCIAL

## 2020-05-12 DIAGNOSIS — E11.42 TYPE 2 DIABETES MELLITUS WITH DIABETIC POLYNEUROPATHY, WITHOUT LONG-TERM CURRENT USE OF INSULIN (HCC): ICD-10-CM

## 2020-05-12 DIAGNOSIS — I10 ESSENTIAL HYPERTENSION: ICD-10-CM

## 2020-05-12 DIAGNOSIS — F41.9 ANXIETY AND DEPRESSION: ICD-10-CM

## 2020-05-12 DIAGNOSIS — F32.A ANXIETY AND DEPRESSION: ICD-10-CM

## 2020-05-12 DIAGNOSIS — K21.9 GERD WITHOUT ESOPHAGITIS: Primary | ICD-10-CM

## 2020-05-12 PROCEDURE — 99214 OFFICE O/P EST MOD 30 MIN: CPT | Performed by: FAMILY MEDICINE

## 2020-05-12 PROCEDURE — 1160F RVW MEDS BY RX/DR IN RCRD: CPT | Performed by: FAMILY MEDICINE

## 2020-05-12 RX ORDER — CLONAZEPAM 1 MG/1
1 TABLET ORAL EVERY EVENING
Qty: 30 TABLET | Refills: 3 | Status: SHIPPED | OUTPATIENT
Start: 2020-05-12 | End: 2020-10-02 | Stop reason: SDUPTHER

## 2020-06-03 DIAGNOSIS — F41.9 ANXIETY AND DEPRESSION: ICD-10-CM

## 2020-06-03 DIAGNOSIS — F32.A ANXIETY AND DEPRESSION: ICD-10-CM

## 2020-06-03 RX ORDER — BUPROPION HYDROCHLORIDE 300 MG/1
300 TABLET ORAL DAILY
Qty: 90 TABLET | Refills: 1 | Status: SHIPPED | OUTPATIENT
Start: 2020-06-03 | End: 2020-06-08

## 2020-06-08 DIAGNOSIS — F41.9 ANXIETY AND DEPRESSION: ICD-10-CM

## 2020-06-08 DIAGNOSIS — F32.A ANXIETY AND DEPRESSION: ICD-10-CM

## 2020-06-08 RX ORDER — BUPROPION HYDROCHLORIDE 300 MG/1
TABLET ORAL
Qty: 90 TABLET | Refills: 0 | Status: SHIPPED | OUTPATIENT
Start: 2020-06-08 | End: 2020-10-02 | Stop reason: SDUPTHER

## 2020-08-10 DIAGNOSIS — E11.65 UNCONTROLLED TYPE 2 DIABETES MELLITUS WITH HYPERGLYCEMIA (HCC): ICD-10-CM

## 2020-08-10 DIAGNOSIS — I10 ESSENTIAL HYPERTENSION: ICD-10-CM

## 2020-08-10 RX ORDER — POTASSIUM CHLORIDE 750 MG/1
TABLET, FILM COATED, EXTENDED RELEASE ORAL
Qty: 180 TABLET | Refills: 1 | Status: SHIPPED | OUTPATIENT
Start: 2020-08-10 | End: 2021-02-24 | Stop reason: SDUPTHER

## 2020-08-10 RX ORDER — METFORMIN HYDROCHLORIDE 500 MG/1
1000 TABLET, EXTENDED RELEASE ORAL 2 TIMES DAILY
Qty: 360 TABLET | Refills: 2 | Status: SHIPPED | OUTPATIENT
Start: 2020-08-10 | End: 2021-04-27

## 2020-08-10 RX ORDER — METFORMIN HYDROCHLORIDE 500 MG/1
500 TABLET, EXTENDED RELEASE ORAL 2 TIMES DAILY
Qty: 180 TABLET | Refills: 1 | Status: SHIPPED | OUTPATIENT
Start: 2020-08-10 | End: 2020-08-10 | Stop reason: SDUPTHER

## 2020-08-10 NOTE — TELEPHONE ENCOUNTER
Confirm with patient away she is now taking the medication? In February both dosage forms were placed in the record as her dose was increased two tablets twice daily unless otherwise changed    If that is still correct and the prescription needs to be changed to accommodate for two tablets daily send a new prescription request back to me with three refills and I will renew it now

## 2020-08-10 NOTE — TELEPHONE ENCOUNTER
Questioning the metformin rx that was sent  Direct 1 BID take 2 in AM and 2 in PM, what do you really want her to take?

## 2020-08-30 DIAGNOSIS — I10 ESSENTIAL HYPERTENSION: ICD-10-CM

## 2020-08-31 RX ORDER — HYDROCHLOROTHIAZIDE 50 MG/1
TABLET ORAL
Qty: 90 TABLET | Refills: 3 | Status: SHIPPED | OUTPATIENT
Start: 2020-08-31 | End: 2021-06-25 | Stop reason: SDUPTHER

## 2020-09-24 ENCOUNTER — TELEPHONE (OUTPATIENT)
Dept: ADMINISTRATIVE | Facility: OTHER | Age: 75
End: 2020-09-24

## 2020-09-24 NOTE — TELEPHONE ENCOUNTER
Upon review of the In Basket request we were able to locate, review, and update the patient chart as requested for Mammogram     Any additional questions or concerns should be emailed to the Practice Liaisons via [email or web address] email, please do not reply via In Basket      Thank you  Charli Granado, [address] [place]

## 2020-09-24 NOTE — TELEPHONE ENCOUNTER
----- Message from Jorge Vela sent at 9/24/2020 11:22 AM EDT -----  Regarding: Mammo  09/24/20 11:22 AM    Hello, our patient Marshall Calero has had Mammogram completed/performed  Please assist in updating the patient chart by pulling a previous Electronic Medical Record (EMR) document  The previous EMR is Care Everywhere, [facility]  The date of service is 9/24/2020  Thank you,  HUGO Plascencia PG

## 2020-10-01 ENCOUNTER — APPOINTMENT (OUTPATIENT)
Dept: LAB | Facility: CLINIC | Age: 75
End: 2020-10-01
Payer: COMMERCIAL

## 2020-10-01 DIAGNOSIS — E11.42 TYPE 2 DIABETES MELLITUS WITH DIABETIC POLYNEUROPATHY, WITHOUT LONG-TERM CURRENT USE OF INSULIN (HCC): ICD-10-CM

## 2020-10-01 LAB
ALBUMIN SERPL BCP-MCNC: 3.9 G/DL (ref 3.5–5)
ALP SERPL-CCNC: 62 U/L (ref 46–116)
ALT SERPL W P-5'-P-CCNC: 14 U/L (ref 12–78)
ANION GAP SERPL CALCULATED.3IONS-SCNC: 5 MMOL/L (ref 4–13)
AST SERPL W P-5'-P-CCNC: 13 U/L (ref 5–45)
BASOPHILS # BLD AUTO: 0.05 THOUSANDS/ΜL (ref 0–0.1)
BASOPHILS NFR BLD AUTO: 1 % (ref 0–1)
BILIRUB SERPL-MCNC: 0.38 MG/DL (ref 0.2–1)
BUN SERPL-MCNC: 9 MG/DL (ref 5–25)
CALCIUM SERPL-MCNC: 10.4 MG/DL (ref 8.3–10.1)
CHLORIDE SERPL-SCNC: 99 MMOL/L (ref 100–108)
CHOLEST SERPL-MCNC: 200 MG/DL (ref 50–200)
CO2 SERPL-SCNC: 34 MMOL/L (ref 21–32)
CREAT SERPL-MCNC: 0.66 MG/DL (ref 0.6–1.3)
EOSINOPHIL # BLD AUTO: 0.24 THOUSAND/ΜL (ref 0–0.61)
EOSINOPHIL NFR BLD AUTO: 2 % (ref 0–6)
ERYTHROCYTE [DISTWIDTH] IN BLOOD BY AUTOMATED COUNT: 13.3 % (ref 11.6–15.1)
EST. AVERAGE GLUCOSE BLD GHB EST-MCNC: 134 MG/DL
GFR SERPL CREATININE-BSD FRML MDRD: 87 ML/MIN/1.73SQ M
GLUCOSE P FAST SERPL-MCNC: 126 MG/DL (ref 65–99)
HBA1C MFR BLD: 6.3 %
HCT VFR BLD AUTO: 51.5 % (ref 34.8–46.1)
HDLC SERPL-MCNC: 50 MG/DL
HGB BLD-MCNC: 16.5 G/DL (ref 11.5–15.4)
IMM GRANULOCYTES # BLD AUTO: 0.03 THOUSAND/UL (ref 0–0.2)
IMM GRANULOCYTES NFR BLD AUTO: 0 % (ref 0–2)
LDLC SERPL CALC-MCNC: 116 MG/DL (ref 0–100)
LYMPHOCYTES # BLD AUTO: 2.66 THOUSANDS/ΜL (ref 0.6–4.47)
LYMPHOCYTES NFR BLD AUTO: 24 % (ref 14–44)
MCH RBC QN AUTO: 30.8 PG (ref 26.8–34.3)
MCHC RBC AUTO-ENTMCNC: 32 G/DL (ref 31.4–37.4)
MCV RBC AUTO: 96 FL (ref 82–98)
MONOCYTES # BLD AUTO: 0.66 THOUSAND/ΜL (ref 0.17–1.22)
MONOCYTES NFR BLD AUTO: 6 % (ref 4–12)
NEUTROPHILS # BLD AUTO: 7.44 THOUSANDS/ΜL (ref 1.85–7.62)
NEUTS SEG NFR BLD AUTO: 67 % (ref 43–75)
NONHDLC SERPL-MCNC: 150 MG/DL
NRBC BLD AUTO-RTO: 0 /100 WBCS
PLATELET # BLD AUTO: 309 THOUSANDS/UL (ref 149–390)
PMV BLD AUTO: 10.3 FL (ref 8.9–12.7)
POTASSIUM SERPL-SCNC: 3.7 MMOL/L (ref 3.5–5.3)
PROT SERPL-MCNC: 7.4 G/DL (ref 6.4–8.2)
RBC # BLD AUTO: 5.36 MILLION/UL (ref 3.81–5.12)
SODIUM SERPL-SCNC: 138 MMOL/L (ref 136–145)
T4 FREE SERPL-MCNC: 1.05 NG/DL (ref 0.76–1.46)
TRIGL SERPL-MCNC: 171 MG/DL
TSH SERPL DL<=0.05 MIU/L-ACNC: 4.9 UIU/ML (ref 0.36–3.74)
WBC # BLD AUTO: 11.08 THOUSAND/UL (ref 4.31–10.16)

## 2020-10-01 PROCEDURE — 80061 LIPID PANEL: CPT

## 2020-10-01 PROCEDURE — 83036 HEMOGLOBIN GLYCOSYLATED A1C: CPT

## 2020-10-01 PROCEDURE — 84443 ASSAY THYROID STIM HORMONE: CPT

## 2020-10-01 PROCEDURE — 80053 COMPREHEN METABOLIC PANEL: CPT

## 2020-10-01 PROCEDURE — 84439 ASSAY OF FREE THYROXINE: CPT

## 2020-10-01 PROCEDURE — 36415 COLL VENOUS BLD VENIPUNCTURE: CPT

## 2020-10-01 PROCEDURE — 85025 COMPLETE CBC W/AUTO DIFF WBC: CPT

## 2020-10-01 PROCEDURE — 3044F HG A1C LEVEL LT 7.0%: CPT | Performed by: FAMILY MEDICINE

## 2020-10-02 ENCOUNTER — OFFICE VISIT (OUTPATIENT)
Dept: FAMILY MEDICINE CLINIC | Facility: CLINIC | Age: 75
End: 2020-10-02
Payer: COMMERCIAL

## 2020-10-02 VITALS
HEIGHT: 61 IN | OXYGEN SATURATION: 98 % | TEMPERATURE: 97.7 F | WEIGHT: 161.6 LBS | BODY MASS INDEX: 30.51 KG/M2 | SYSTOLIC BLOOD PRESSURE: 132 MMHG | DIASTOLIC BLOOD PRESSURE: 80 MMHG | HEART RATE: 80 BPM

## 2020-10-02 DIAGNOSIS — E11.42 TYPE 2 DIABETES MELLITUS WITH DIABETIC POLYNEUROPATHY, WITHOUT LONG-TERM CURRENT USE OF INSULIN (HCC): ICD-10-CM

## 2020-10-02 DIAGNOSIS — K21.9 GERD WITHOUT ESOPHAGITIS: ICD-10-CM

## 2020-10-02 DIAGNOSIS — Z23 ENCOUNTER FOR IMMUNIZATION: Primary | ICD-10-CM

## 2020-10-02 DIAGNOSIS — F32.A ANXIETY AND DEPRESSION: ICD-10-CM

## 2020-10-02 DIAGNOSIS — Z00.00 MEDICARE ANNUAL WELLNESS VISIT, SUBSEQUENT: ICD-10-CM

## 2020-10-02 DIAGNOSIS — I10 ESSENTIAL HYPERTENSION: ICD-10-CM

## 2020-10-02 DIAGNOSIS — J45.21 MILD INTERMITTENT REACTIVE AIRWAY DISEASE WITH ACUTE EXACERBATION: ICD-10-CM

## 2020-10-02 DIAGNOSIS — N39.46 MIXED STRESS AND URGE URINARY INCONTINENCE: ICD-10-CM

## 2020-10-02 DIAGNOSIS — F41.9 ANXIETY AND DEPRESSION: ICD-10-CM

## 2020-10-02 PROCEDURE — G0439 PPPS, SUBSEQ VISIT: HCPCS | Performed by: FAMILY MEDICINE

## 2020-10-02 PROCEDURE — 1160F RVW MEDS BY RX/DR IN RCRD: CPT | Performed by: FAMILY MEDICINE

## 2020-10-02 PROCEDURE — 3075F SYST BP GE 130 - 139MM HG: CPT | Performed by: FAMILY MEDICINE

## 2020-10-02 PROCEDURE — 1125F AMNT PAIN NOTED PAIN PRSNT: CPT | Performed by: FAMILY MEDICINE

## 2020-10-02 PROCEDURE — 3079F DIAST BP 80-89 MM HG: CPT | Performed by: FAMILY MEDICINE

## 2020-10-02 PROCEDURE — G0008 ADMIN INFLUENZA VIRUS VAC: HCPCS | Performed by: FAMILY MEDICINE

## 2020-10-02 PROCEDURE — 3725F SCREEN DEPRESSION PERFORMED: CPT | Performed by: FAMILY MEDICINE

## 2020-10-02 PROCEDURE — 99214 OFFICE O/P EST MOD 30 MIN: CPT | Performed by: FAMILY MEDICINE

## 2020-10-02 PROCEDURE — 90662 IIV NO PRSV INCREASED AG IM: CPT | Performed by: FAMILY MEDICINE

## 2020-10-02 PROCEDURE — 1170F FXNL STATUS ASSESSED: CPT | Performed by: FAMILY MEDICINE

## 2020-10-02 RX ORDER — BUPROPION HYDROCHLORIDE 300 MG/1
300 TABLET ORAL DAILY
Qty: 90 TABLET | Refills: 0 | Status: SHIPPED | OUTPATIENT
Start: 2020-10-02 | End: 2021-01-04

## 2020-10-02 RX ORDER — SOLIFENACIN SUCCINATE 5 MG/1
5 TABLET, FILM COATED ORAL DAILY
Qty: 30 TABLET | Refills: 5 | Status: SHIPPED | OUTPATIENT
Start: 2020-10-02 | End: 2021-04-06 | Stop reason: SDUPTHER

## 2020-10-02 RX ORDER — ACETAMINOPHEN 325 MG/1
650 TABLET ORAL EVERY 6 HOURS PRN
COMMUNITY

## 2020-10-02 RX ORDER — CLONAZEPAM 1 MG/1
1 TABLET ORAL EVERY EVENING
Qty: 30 TABLET | Refills: 3 | Status: SHIPPED | OUTPATIENT
Start: 2020-10-02 | End: 2021-02-11

## 2020-11-20 DIAGNOSIS — I10 ESSENTIAL HYPERTENSION: ICD-10-CM

## 2020-11-20 DIAGNOSIS — E11.42 TYPE 2 DIABETES MELLITUS WITH DIABETIC POLYNEUROPATHY, WITHOUT LONG-TERM CURRENT USE OF INSULIN (HCC): ICD-10-CM

## 2021-01-02 DIAGNOSIS — F32.A ANXIETY AND DEPRESSION: ICD-10-CM

## 2021-01-02 DIAGNOSIS — F41.9 ANXIETY AND DEPRESSION: ICD-10-CM

## 2021-01-04 RX ORDER — BUPROPION HYDROCHLORIDE 300 MG/1
TABLET ORAL
Qty: 90 TABLET | Refills: 1 | Status: SHIPPED | OUTPATIENT
Start: 2021-01-04 | End: 2021-04-06 | Stop reason: SDUPTHER

## 2021-02-04 DIAGNOSIS — E11.42 TYPE 2 DIABETES MELLITUS WITH DIABETIC POLYNEUROPATHY, WITHOUT LONG-TERM CURRENT USE OF INSULIN (HCC): ICD-10-CM

## 2021-02-04 RX ORDER — LANCETS 33 GAUGE
EACH MISCELLANEOUS
Qty: 100 EACH | Refills: 5 | Status: SHIPPED | OUTPATIENT
Start: 2021-02-04 | End: 2021-10-27 | Stop reason: SDUPTHER

## 2021-02-04 RX ORDER — BLOOD SUGAR DIAGNOSTIC
STRIP MISCELLANEOUS
Qty: 100 EACH | Refills: 5 | Status: SHIPPED | OUTPATIENT
Start: 2021-02-04 | End: 2021-10-27 | Stop reason: SDUPTHER

## 2021-02-11 DIAGNOSIS — F32.A ANXIETY AND DEPRESSION: ICD-10-CM

## 2021-02-11 DIAGNOSIS — F41.9 ANXIETY AND DEPRESSION: ICD-10-CM

## 2021-02-11 RX ORDER — CLONAZEPAM 1 MG/1
TABLET ORAL
Qty: 30 TABLET | Refills: 3 | Status: SHIPPED | OUTPATIENT
Start: 2021-02-11 | End: 2021-06-25 | Stop reason: SDUPTHER

## 2021-02-20 ENCOUNTER — LAB (OUTPATIENT)
Dept: LAB | Facility: CLINIC | Age: 76
End: 2021-02-20
Payer: COMMERCIAL

## 2021-02-20 DIAGNOSIS — J45.21 MILD INTERMITTENT REACTIVE AIRWAY DISEASE WITH ACUTE EXACERBATION: ICD-10-CM

## 2021-02-20 DIAGNOSIS — Z23 ENCOUNTER FOR IMMUNIZATION: ICD-10-CM

## 2021-02-20 DIAGNOSIS — F32.A ANXIETY AND DEPRESSION: ICD-10-CM

## 2021-02-20 DIAGNOSIS — I10 ESSENTIAL HYPERTENSION: ICD-10-CM

## 2021-02-20 DIAGNOSIS — K21.9 GERD WITHOUT ESOPHAGITIS: ICD-10-CM

## 2021-02-20 DIAGNOSIS — Z00.00 MEDICARE ANNUAL WELLNESS VISIT, SUBSEQUENT: ICD-10-CM

## 2021-02-20 DIAGNOSIS — E11.42 TYPE 2 DIABETES MELLITUS WITH DIABETIC POLYNEUROPATHY, WITHOUT LONG-TERM CURRENT USE OF INSULIN (HCC): ICD-10-CM

## 2021-02-20 DIAGNOSIS — F41.9 ANXIETY AND DEPRESSION: ICD-10-CM

## 2021-02-20 LAB
ALBUMIN SERPL BCP-MCNC: 3.8 G/DL (ref 3.5–5)
ALP SERPL-CCNC: 52 U/L (ref 46–116)
ALT SERPL W P-5'-P-CCNC: 14 U/L (ref 12–78)
ANION GAP SERPL CALCULATED.3IONS-SCNC: 7 MMOL/L (ref 4–13)
AST SERPL W P-5'-P-CCNC: 10 U/L (ref 5–45)
BASOPHILS # BLD AUTO: 0.05 THOUSANDS/ΜL (ref 0–0.1)
BASOPHILS NFR BLD AUTO: 1 % (ref 0–1)
BILIRUB SERPL-MCNC: 0.3 MG/DL (ref 0.2–1)
BUN SERPL-MCNC: 7 MG/DL (ref 5–25)
CALCIUM SERPL-MCNC: 9.8 MG/DL (ref 8.3–10.1)
CHLORIDE SERPL-SCNC: 95 MMOL/L (ref 100–108)
CHOLEST SERPL-MCNC: 156 MG/DL (ref 50–200)
CO2 SERPL-SCNC: 33 MMOL/L (ref 21–32)
CREAT SERPL-MCNC: 0.53 MG/DL (ref 0.6–1.3)
EOSINOPHIL # BLD AUTO: 0.3 THOUSAND/ΜL (ref 0–0.61)
EOSINOPHIL NFR BLD AUTO: 3 % (ref 0–6)
ERYTHROCYTE [DISTWIDTH] IN BLOOD BY AUTOMATED COUNT: 13 % (ref 11.6–15.1)
EST. AVERAGE GLUCOSE BLD GHB EST-MCNC: 123 MG/DL
GFR SERPL CREATININE-BSD FRML MDRD: 94 ML/MIN/1.73SQ M
GLUCOSE P FAST SERPL-MCNC: 109 MG/DL (ref 65–99)
HBA1C MFR BLD: 5.9 %
HCT VFR BLD AUTO: 49.2 % (ref 34.8–46.1)
HDLC SERPL-MCNC: 46 MG/DL
HGB BLD-MCNC: 16.2 G/DL (ref 11.5–15.4)
IMM GRANULOCYTES # BLD AUTO: 0.03 THOUSAND/UL (ref 0–0.2)
IMM GRANULOCYTES NFR BLD AUTO: 0 % (ref 0–2)
LDLC SERPL CALC-MCNC: 72 MG/DL (ref 0–100)
LYMPHOCYTES # BLD AUTO: 3.15 THOUSANDS/ΜL (ref 0.6–4.47)
LYMPHOCYTES NFR BLD AUTO: 31 % (ref 14–44)
MCH RBC QN AUTO: 31.2 PG (ref 26.8–34.3)
MCHC RBC AUTO-ENTMCNC: 32.9 G/DL (ref 31.4–37.4)
MCV RBC AUTO: 95 FL (ref 82–98)
MONOCYTES # BLD AUTO: 0.63 THOUSAND/ΜL (ref 0.17–1.22)
MONOCYTES NFR BLD AUTO: 6 % (ref 4–12)
NEUTROPHILS # BLD AUTO: 6.18 THOUSANDS/ΜL (ref 1.85–7.62)
NEUTS SEG NFR BLD AUTO: 59 % (ref 43–75)
NONHDLC SERPL-MCNC: 110 MG/DL
NRBC BLD AUTO-RTO: 0 /100 WBCS
PLATELET # BLD AUTO: 336 THOUSANDS/UL (ref 149–390)
PMV BLD AUTO: 10.1 FL (ref 8.9–12.7)
POTASSIUM SERPL-SCNC: 3.7 MMOL/L (ref 3.5–5.3)
PROT SERPL-MCNC: 7 G/DL (ref 6.4–8.2)
RBC # BLD AUTO: 5.19 MILLION/UL (ref 3.81–5.12)
SODIUM SERPL-SCNC: 135 MMOL/L (ref 136–145)
TRIGL SERPL-MCNC: 192 MG/DL
TSH SERPL DL<=0.05 MIU/L-ACNC: 2.02 UIU/ML (ref 0.36–3.74)
WBC # BLD AUTO: 10.34 THOUSAND/UL (ref 4.31–10.16)

## 2021-02-20 PROCEDURE — 80053 COMPREHEN METABOLIC PANEL: CPT

## 2021-02-20 PROCEDURE — 36415 COLL VENOUS BLD VENIPUNCTURE: CPT

## 2021-02-20 PROCEDURE — 80061 LIPID PANEL: CPT

## 2021-02-20 PROCEDURE — 3044F HG A1C LEVEL LT 7.0%: CPT | Performed by: FAMILY MEDICINE

## 2021-02-20 PROCEDURE — 84443 ASSAY THYROID STIM HORMONE: CPT

## 2021-02-20 PROCEDURE — 85025 COMPLETE CBC W/AUTO DIFF WBC: CPT

## 2021-02-20 PROCEDURE — 83036 HEMOGLOBIN GLYCOSYLATED A1C: CPT

## 2021-02-24 ENCOUNTER — OFFICE VISIT (OUTPATIENT)
Dept: FAMILY MEDICINE CLINIC | Facility: CLINIC | Age: 76
End: 2021-02-24
Payer: COMMERCIAL

## 2021-02-24 VITALS
TEMPERATURE: 98.8 F | OXYGEN SATURATION: 95 % | BODY MASS INDEX: 30.4 KG/M2 | HEART RATE: 84 BPM | HEIGHT: 61 IN | SYSTOLIC BLOOD PRESSURE: 124 MMHG | WEIGHT: 161 LBS | DIASTOLIC BLOOD PRESSURE: 82 MMHG

## 2021-02-24 DIAGNOSIS — F32.A ANXIETY AND DEPRESSION: ICD-10-CM

## 2021-02-24 DIAGNOSIS — I10 ESSENTIAL HYPERTENSION: ICD-10-CM

## 2021-02-24 DIAGNOSIS — F41.9 ANXIETY AND DEPRESSION: ICD-10-CM

## 2021-02-24 DIAGNOSIS — K21.9 GERD WITHOUT ESOPHAGITIS: ICD-10-CM

## 2021-02-24 DIAGNOSIS — E11.42 TYPE 2 DIABETES MELLITUS WITH DIABETIC POLYNEUROPATHY, WITHOUT LONG-TERM CURRENT USE OF INSULIN (HCC): Primary | ICD-10-CM

## 2021-02-24 PROCEDURE — 3725F SCREEN DEPRESSION PERFORMED: CPT | Performed by: FAMILY MEDICINE

## 2021-02-24 PROCEDURE — 1160F RVW MEDS BY RX/DR IN RCRD: CPT | Performed by: FAMILY MEDICINE

## 2021-02-24 PROCEDURE — 99214 OFFICE O/P EST MOD 30 MIN: CPT | Performed by: FAMILY MEDICINE

## 2021-02-24 PROCEDURE — 3074F SYST BP LT 130 MM HG: CPT | Performed by: FAMILY MEDICINE

## 2021-02-24 PROCEDURE — 3079F DIAST BP 80-89 MM HG: CPT | Performed by: FAMILY MEDICINE

## 2021-02-24 PROCEDURE — 3008F BODY MASS INDEX DOCD: CPT | Performed by: FAMILY MEDICINE

## 2021-02-24 RX ORDER — POTASSIUM CHLORIDE 750 MG/1
TABLET, FILM COATED, EXTENDED RELEASE ORAL
Qty: 180 TABLET | Refills: 1 | Status: SHIPPED | OUTPATIENT
Start: 2021-02-24 | End: 2021-09-10 | Stop reason: SDUPTHER

## 2021-02-24 NOTE — ASSESSMENT & PLAN NOTE
Anxiety depression stable mood has been stable sleep patterns good energy levels are okay now follow-up in 4 months continue current medications as needed reduce anxiety medication now and taper down as needed

## 2021-02-24 NOTE — ASSESSMENT & PLAN NOTE
Lab Results   Component Value Date    HGBA1C 5 9 (H) 02/20/2021    diabetes with polyneuropathy continue with current dosage on medications following proper diet guidelines A1c is stable at 5 9 now she is doing well overall she will work on increasing exercise and diet moving forward into the spring and re-evaluate in 4 months

## 2021-02-24 NOTE — PROGRESS NOTES
Assessment/Plan:       Problem List Items Addressed This Visit        Digestive    GERD without esophagitis      GERD without esophagitis stable continue with Dexilant 60 mg capsules         Relevant Orders    CBC and differential    Comprehensive metabolic panel    Lipid panel    Hemoglobin A1C    TSH, 3rd generation with Free T4 reflex       Endocrine    Type 2 diabetes mellitus with diabetic polyneuropathy ([facility] [address] ) - Primary       Lab Results   Component Value Date    HGBA1C 5 9 (H) 02/20/2021    diabetes with polyneuropathy continue with current dosage on medications following proper diet guidelines A1c is stable at 5 9 now she is doing well overall she will work on increasing exercise and diet moving forward into the spring and re-evaluate in 4 months         Relevant Orders    Microalbumin / creatinine urine ratio    CBC and differential    Comprehensive metabolic panel    Lipid panel    Hemoglobin A1C    TSH, 3rd generation with Free T4 reflex       Cardiovascular and Mediastinum    Essential hypertension      Hypertension stable continue current medications as directed patient doing well overall avoiding salt in excess she will work on diet and exercise as we move forward into the spring and re-evaluate her back here in 4 months         Relevant Orders    CBC and differential    Comprehensive metabolic panel    Lipid panel    Hemoglobin A1C    TSH, 3rd generation with Free T4 reflex       Other    Anxiety and depression      Anxiety depression stable mood has been stable sleep patterns good energy levels are okay now follow-up in 4 months continue current medications as needed reduce anxiety medication now and taper down as needed         Relevant Orders    CBC and differential    Comprehensive metabolic panel    Lipid panel    Hemoglobin A1C    TSH, 3rd generation with Free T4 reflex            Subjective:      Patient ID: Barbie Pinto is a 77 y o  female       Patient presents for general checkup and evaluation review of medications and laboratory work she is doing relatively well  Stressed over friend woman moved in going through divorce  The following portions of the patient's history were reviewed and updated as appropriate: allergies, current medications, past family history, past medical history, past social history, past surgical history and problem list     Review of Systems   Constitutional: Negative for chills, fatigue and fever  HENT: Positive for postnasal drip  Negative for congestion, nosebleeds, rhinorrhea, sinus pressure and sore throat  Eyes: Negative for discharge and redness  Respiratory: Negative for cough and shortness of breath  Cardiovascular: Negative for chest pain, palpitations and leg swelling  Gastrointestinal: Negative for abdominal pain, blood in stool and nausea  Endocrine: Negative for cold intolerance, heat intolerance and polyuria  Genitourinary: Negative for dysuria and frequency  Musculoskeletal: Negative for arthralgias, back pain and myalgias  Skin: Negative for rash  Neurological: Negative for dizziness, weakness and headaches  Hematological: Negative for adenopathy  Psychiatric/Behavioral: Negative for behavioral problems and sleep disturbance  The patient is not nervous/anxious  Objective:      /82   Pulse 84   Temp 98 8 °F (37 1 °C)   Ht 5' 1" (1 549 m)   Wt 73 kg (161 lb)   SpO2 95%   BMI 30 42 kg/m²        Physical Exam  Vitals signs and nursing note reviewed  Constitutional:       General: She is not in acute distress  Appearance: Normal appearance  She is well-developed and normal weight  HENT:      Head: Normocephalic and atraumatic  Right Ear: Tympanic membrane, ear canal and external ear normal       Left Ear: Tympanic membrane, ear canal and external ear normal       Nose: Nose normal       Mouth/Throat:      Mouth: Mucous membranes are moist       Pharynx: Oropharynx is clear   No oropharyngeal exudate  Eyes:      General: No scleral icterus  Right eye: No discharge  Left eye: No discharge  Conjunctiva/sclera: Conjunctivae normal       Pupils: Pupils are equal, round, and reactive to light  Neck:      Musculoskeletal: Normal range of motion and neck supple  Thyroid: No thyromegaly  Vascular: No JVD  Cardiovascular:      Rate and Rhythm: Normal rate and regular rhythm  Heart sounds: Normal heart sounds  No murmur  Pulmonary:      Effort: Pulmonary effort is normal       Breath sounds: No wheezing or rales  Chest:      Chest wall: No tenderness  Abdominal:      General: Abdomen is flat  Bowel sounds are normal  There is no distension  Palpations: Abdomen is soft  There is no mass  Tenderness: There is no abdominal tenderness  Musculoskeletal: Normal range of motion  General: No tenderness or deformity  Lymphadenopathy:      Cervical: No cervical adenopathy  Skin:     General: Skin is warm and dry  Capillary Refill: Capillary refill takes less than 2 seconds  Findings: No rash  Neurological:      General: No focal deficit present  Mental Status: She is alert and oriented to person, place, and time  Cranial Nerves: No cranial nerve deficit  Coordination: Coordination normal       Deep Tendon Reflexes: Reflexes are normal and symmetric  Reflexes normal    Psychiatric:         Mood and Affect: Mood normal          Behavior: Behavior normal          Thought Content: Thought content normal          Judgment: Judgment normal           Data:    Laboratory Results: I have personally reviewed the pertinent laboratory results/reports   Radiology/Other Diagnostic Testing Results: I have personally reviewed pertinent reports         Lab Results   Component Value Date    WBC 10 34 (H) 02/20/2021    HGB 16 2 (H) 02/20/2021    HCT 49 2 (H) 02/20/2021    MCV 95 02/20/2021     02/20/2021     Lab Results   Component Value Date     05/21/2018    K 3 7 02/20/2021    CL 95 (L) 02/20/2021    CO2 33 (H) 02/20/2021    ANIONGAP 14 0 05/21/2018    BUN 7 02/20/2021    CREATININE 0 53 (L) 02/20/2021    GLUF 109 (H) 02/20/2021    CALCIUM 9 8 02/20/2021    AST 10 02/20/2021    ALT 14 02/20/2021    ALKPHOS 52 02/20/2021    PROT 6 4 05/21/2018    BILITOT 0 4 05/21/2018    EGFR 94 02/20/2021     Lab Results   Component Value Date    CHOLESTEROL 156 02/20/2021    CHOLESTEROL 200 10/01/2020    CHOLESTEROL 200 05/11/2020     Lab Results   Component Value Date    HDL 46 02/20/2021    HDL 50 10/01/2020    HDL 52 05/11/2020     Lab Results   Component Value Date    LDLCALC 72 02/20/2021    LDLCALC 116 (H) 10/01/2020    LDLCALC 111 (H) 05/11/2020     Lab Results   Component Value Date    TRIG 192 (H) 02/20/2021    TRIG 171 (H) 10/01/2020    TRIG 186 (H) 05/11/2020     No results found for: [place], [place]  Lab Results   Component Value Date    BOE1NSMLRIFI 2 020 02/20/2021     Lab Results   Component Value Date    HGBA1C 5 9 (H) 02/20/2021     [facility] results found for: [facility]

## 2021-02-24 NOTE — PATIENT INSTRUCTIONS
Meal Planning with Diabetes Exchanges   AMBULATORY CARE:   Diabetes exchanges  are servings of food that contain similar amounts of carbohydrate, fat, protein, and calories within a food group  The exchanges can be used to develop a healthy meal plan that helps to keep your blood sugar within the recommended levels  A meal plan with the right amount of carbohydrates is especially important  Your blood sugar naturally rises after you eat carbohydrates  Too many carbohydrates in 1 meal or snack can raise your blood sugar level  Carbohydrates are found in starches, fruit, milk, yogurt, and sweets  Call your doctor if:   · You have high blood sugar levels during a certain time of day, or almost all of the time  · You often have low blood sugar levels  · You have questions or concerns about your condition or care  Create a meal plan with exchanges:  A dietitian will work with you to develop a healthy meal plan that is right for you  This meal plan will include the amount of exchanges you can have from each food group throughout the day  Follow your meal plan by keeping track of the amount of exchanges you eat for each meal and snack  Your meal plan will be based on your age, weight, blood sugar levels, medicine, and activity level  Starch food group exchanges:  Each exchange below contains about 15 grams of carbohydrate , 3 grams of protein, 1 gram of fat, and 80 calories  · 1 ounce of white, whole wheat or rye bread (1 slice)    · 1 ounce of bagel (about ¼ of a bagel)    · 1 6-inch flour or corn tortilla or 1 4-inch pancake (about ¼ inch thick)    · ?  cup of cooked pasta or rice    · ¾ cup of dry, ready-to-eat cereal with no sugar added     · ½ cup of cooked cereal, such as oatmeal    · 3 lavon cracker squares or 8 animal crackers    · 6 saltine-type crackers or     · 3 cups of popcorn or ¾ ounce of pretzels     · Starchy vegetables and cooked legumes:      ? ½ cup of corn, green peas, sweet potatoes, or mashed potatoes     ? ¼ of a large baked potato     ? 1 cup of acorn, butternut squash, or pumpkin     ? ½ cup of beans, lentils, or peas (such as queen, kidney, or black-eyed)    ? ? cup of lima beans    Fruit group exchanges:  Each exchange contains about 15 grams of carbohydrate  and 60 calories  · 1 small (4 ounce) apple, banana orange, or nectarine    · ½ cup of canned or fresh fruit    · ½ cup (4 ounces) of unsweetened fruit juice    · 2 tablespoons of dried fruit    Milk group exchanges:  Each exchange contains about 12 grams of carbohydrate  and 8 grams of protein  The amount of fat and calories in each serving depends on the type of milk (such as whole, low-fat, or fat-free)  · 1 cup fat-free or low-fat milk    · ¾ cup of plain, nonfat yogurt    · 1 cup fat-free, flavored yogurt with artificial (no calorie) sweetener    Non-starchy vegetable group exchanges:  Each exchange contains about 5 grams of carbohydrate , 2 grams of protein, and 25 calories  Examples include beets, broccoli, cabbage, carrots, cauliflower, cucumber, mushrooms, tomatoes, and zucchini  · ½ cup of cooked vegetables or 1 cup of raw vegetables     · ½ cup of vegetable juice    Meat and meat substitute group exchanges:  Each exchange of a lean meat  listed below contains about 7 grams of protein, 0 to 3 grams of fat, and 45 calories  The meat and meat substitutes food group does not contain any carbohydrates  Medium and high-fat meats have more calories  · 1 ounce of chicken or turkey without skin, or 1 ounce of fish (not breaded or fried)     · 1 ounce of lean beef, pork, or lamb     · 1-inch cube or 1 ounce of low-fat cheese     · 2 egg whites or ¼ cup of egg substitute     · ½ cup of tofu    Sweets, desserts, and other carbohydrate group exchanges:   · Sweets and other desserts:  Each exchange has about 15 grams of carbohydrate   ? 1 ounce of karla food cake or 2-inch square cake (unfrosted)    ? 2 small cookies     ?  ½ cup of sugar-free, fat-free ice cream    ? 1 tablespoon of syrup, jam, jelly, table sugar, or honey    · Combination foods:     ? 1 cup of an entrée, such as lasagna, spaghetti with meatballs, macaroni and cheese, and chili with beans (each serving counts as 2 carbohydrate exchanges )     ? 1 cup of tomato or vegetable beef soup (each serving counts as 1 carbohydrate exchange )    Fat group exchanges:  Each exchange contains 5 grams of fat and 45 calories  · 1 teaspoon of oil (such as canola, olive, or corn oil)     · 6 almonds or cashews, 10 peanuts, or 4 pecan halves     · 2 tablespoons of avocado     · ½ tablespoon of peanut butter     · 1 teaspoon of regular margarine or 2 teaspoons of low-fat margarine     · 1 teaspoon of regular butter or 1 tablespoon of low-fat butter     · 1 teaspoon of regular mayonnaise or 1 tablespoon of low-fat mayonnaise     · 1 tablespoon of regular salad dressing or 2 tablespoons of low-fat salad dressing    Free foods: The foods on this list are called free foods because they have very few calories  Free foods usually do not increase your blood sugar if you limit them  · 1 tablespoon of catsup or taco sauce     · ¼ cup of salsa     · 2 tablespoons of sugar-free syrup or 2 teaspoons of light jam or jelly     · 1 tablespoon of fat-free salad dressing     · 4 tablespoons of fat-free margarine or fat-free mayonnaise     · Sugar-free drinks: diet soda, sugar-free drink mixes, or mineral water     · Low-sodium bouillon or fat-free broth     · Mustard     · Seasonings such as spices, herbs, and garlic     · Sugar-free gelatin without added fruit    Other healthy nutrition guidelines:   · Limit drinks with sugar substitutes  Your dietitian or healthcare provider will encourage you to drink water  Water helps your kidneys to function properly  Ask how much water you should drink every day  · Eat more fiber    Choose foods that are good sources of fiber, such as fruits, vegetables, and whole grains  Cereals that contain 5 or more grams of fiber per serving are good sources of fiber  Legumes such as garbanzo, queen beans, kidney beans, and lentils are also good sources  · Limit fat  Ask your dietitian or healthcare provider how much fat you should eat each day  Choose foods low in fat, saturated fat, trans fat, and cholesterol  Examples include turkey or chicken without the skin, fish, lean cuts of meat, and beans  Low-fat dairy foods, such as low-fat or fat-free milk and low-fat yogurt are also good choices  Omega-3 fatty acids are healthy fats that are found in canola oil, soybean oil and fatty fish  [place], albacore tuna, and sardines are good sources of omega 3 fatty acids  Eat 2 servings of these types of fish each week  Do not eat fried fish  · Limit sugar  Sugar and sweets must be counted toward the carbohydrate exchanges that you can have within your meal plan  Limit sugar and sweets because they are usually also high in calories and fat  Eat smaller portions of sweets by sharing a dessert or asking for a child-size portion at a restaurant  · Limit sodium  (salt) to about 2,300 mg per day  You may need to eat even less sodium if you have certain medical conditions  Foods high in sodium include soy sauce, potato chips, and soup  · Limit alcohol  Ask your healthcare provider if it is safe for you to drink alcohol  If alcohol is safe for you to have, eat a meal when you drink alcohol  If you drink alcohol on an empty stomach, your blood sugar may drop to a low level  Women 21 years or older and men 72 years or older should limit alcohol to 1 drink a day  Men aged 24 to 59 years should limit alcohol to 2 drinks a day  A drink of alcohol is 5 ounces of wine, 12 ounces of beer, or 1½ ounces of liquor  Other ways to manage your diabetes:   · Control your blood sugar level  Test your blood sugar level regularly and keep a record of the results   Ask your healthcare provider when and how often to test your blood sugar  You may need to check your blood sugar level at least 3 times each day  · Talk to your healthcare provider about your weight  Ask if you need to lose weight, and how much you need to lose  If you are overweight, you may need to make other changes to lose weight  Ask your healthcare provider to help you create a weight loss program      · Get regular physical activity  Physical activity can help decrease your blood sugar level  It can also help to decrease your risk for heart disease and help you lose weight  Adults should have moderate intensity physical activity for at least 150 minutes every week  Spread the amount of activity over at least 3 days a week  Do not skip more than 2 days in a row  Children should get at least 60 minutes of moderate physical activity on most days of the week  Examples of moderate physical activity include brisk walking, running, and swimming  Do not sit for longer than 30 minutes  Work with your healthcare provider to create a plan for physical activity  © Copyright [address] Information is for End User's use only and may not be sold, redistributed or otherwise used for commercial purposes  All illustrations and images included in CareNotes® are the copyrighted property of [employer]  or [address] [place] [address]  The above information is an  only  It is not intended as medical advice for individual conditions or treatments  Talk to your doctor, nurse or pharmacist before following any medical regimen to see if it is safe and effective for you

## 2021-02-24 NOTE — ASSESSMENT & PLAN NOTE
Hypertension stable continue current medications as directed patient doing well overall avoiding salt in excess she will work on diet and exercise as we move forward into the spring and re-evaluate her back here in 4 months

## 2021-04-06 DIAGNOSIS — K21.9 GERD WITHOUT ESOPHAGITIS: ICD-10-CM

## 2021-04-06 DIAGNOSIS — F32.A ANXIETY AND DEPRESSION: ICD-10-CM

## 2021-04-06 DIAGNOSIS — N39.46 MIXED STRESS AND URGE URINARY INCONTINENCE: ICD-10-CM

## 2021-04-06 DIAGNOSIS — E11.9 DIABETES MELLITUS WITHOUT COMPLICATION (HCC): Primary | ICD-10-CM

## 2021-04-06 DIAGNOSIS — F41.9 ANXIETY AND DEPRESSION: ICD-10-CM

## 2021-04-06 RX ORDER — PANTOPRAZOLE SODIUM 40 MG/1
40 TABLET, DELAYED RELEASE ORAL DAILY
Qty: 90 TABLET | Refills: 3 | Status: SHIPPED | OUTPATIENT
Start: 2021-04-06 | End: 2021-12-15 | Stop reason: SDUPTHER

## 2021-04-06 RX ORDER — BUPROPION HYDROCHLORIDE 300 MG/1
300 TABLET ORAL DAILY
Qty: 90 TABLET | Refills: 1 | Status: SHIPPED | OUTPATIENT
Start: 2021-04-06 | End: 2021-10-18

## 2021-04-06 RX ORDER — GABAPENTIN 600 MG/1
600 TABLET ORAL EVERY EVENING
Qty: 90 TABLET | Refills: 0 | Status: SHIPPED | OUTPATIENT
Start: 2021-04-06

## 2021-04-06 RX ORDER — SOLIFENACIN SUCCINATE 5 MG/1
5 TABLET, FILM COATED ORAL DAILY
Qty: 90 TABLET | Refills: 0 | Status: SHIPPED | OUTPATIENT
Start: 2021-04-06 | End: 2021-06-25 | Stop reason: SDUPTHER

## 2021-04-06 RX ORDER — GABAPENTIN 400 MG/1
400 CAPSULE ORAL 2 TIMES DAILY
Qty: 180 CAPSULE | Refills: 0 | Status: SHIPPED | OUTPATIENT
Start: 2021-04-06

## 2021-04-27 DIAGNOSIS — E11.65 UNCONTROLLED TYPE 2 DIABETES MELLITUS WITH HYPERGLYCEMIA (HCC): ICD-10-CM

## 2021-04-27 RX ORDER — METFORMIN HYDROCHLORIDE 500 MG/1
TABLET, EXTENDED RELEASE ORAL
Qty: 360 TABLET | Refills: 2 | Status: SHIPPED | OUTPATIENT
Start: 2021-04-27 | End: 2021-08-11

## 2021-06-24 ENCOUNTER — APPOINTMENT (OUTPATIENT)
Dept: LAB | Facility: CLINIC | Age: 76
End: 2021-06-24
Payer: COMMERCIAL

## 2021-06-24 DIAGNOSIS — F41.9 ANXIETY AND DEPRESSION: ICD-10-CM

## 2021-06-24 DIAGNOSIS — E11.42 TYPE 2 DIABETES MELLITUS WITH DIABETIC POLYNEUROPATHY, WITHOUT LONG-TERM CURRENT USE OF INSULIN (HCC): ICD-10-CM

## 2021-06-24 DIAGNOSIS — I10 ESSENTIAL HYPERTENSION: ICD-10-CM

## 2021-06-24 DIAGNOSIS — K21.9 GERD WITHOUT ESOPHAGITIS: ICD-10-CM

## 2021-06-24 DIAGNOSIS — F32.A ANXIETY AND DEPRESSION: ICD-10-CM

## 2021-06-24 LAB
ALBUMIN SERPL BCP-MCNC: 3.6 G/DL (ref 3.5–5)
ALP SERPL-CCNC: 53 U/L (ref 46–116)
ALT SERPL W P-5'-P-CCNC: 14 U/L (ref 12–78)
ANION GAP SERPL CALCULATED.3IONS-SCNC: 7 MMOL/L (ref 4–13)
AST SERPL W P-5'-P-CCNC: 8 U/L (ref 5–45)
BASOPHILS # BLD AUTO: 0.06 THOUSANDS/ΜL (ref 0–0.1)
BASOPHILS NFR BLD AUTO: 1 % (ref 0–1)
BILIRUB SERPL-MCNC: 0.28 MG/DL (ref 0.2–1)
BUN SERPL-MCNC: 10 MG/DL (ref 5–25)
CALCIUM SERPL-MCNC: 9.9 MG/DL (ref 8.3–10.1)
CHLORIDE SERPL-SCNC: 97 MMOL/L (ref 100–108)
CHOLEST SERPL-MCNC: 178 MG/DL (ref 50–200)
CO2 SERPL-SCNC: 32 MMOL/L (ref 21–32)
CREAT SERPL-MCNC: 0.46 MG/DL (ref 0.6–1.3)
CREAT UR-MCNC: 103 MG/DL
EOSINOPHIL # BLD AUTO: 0.33 THOUSAND/ΜL (ref 0–0.61)
EOSINOPHIL NFR BLD AUTO: 4 % (ref 0–6)
ERYTHROCYTE [DISTWIDTH] IN BLOOD BY AUTOMATED COUNT: 13 % (ref 11.6–15.1)
EST. AVERAGE GLUCOSE BLD GHB EST-MCNC: 134 MG/DL
GFR SERPL CREATININE-BSD FRML MDRD: 98 ML/MIN/1.73SQ M
GLUCOSE P FAST SERPL-MCNC: 122 MG/DL (ref 65–99)
HBA1C MFR BLD: 6.3 %
HCT VFR BLD AUTO: 46.7 % (ref 34.8–46.1)
HDLC SERPL-MCNC: 47 MG/DL
HGB BLD-MCNC: 15.5 G/DL (ref 11.5–15.4)
IMM GRANULOCYTES # BLD AUTO: 0.03 THOUSAND/UL (ref 0–0.2)
IMM GRANULOCYTES NFR BLD AUTO: 0 % (ref 0–2)
LDLC SERPL CALC-MCNC: 91 MG/DL (ref 0–100)
LYMPHOCYTES # BLD AUTO: 2.94 THOUSANDS/ΜL (ref 0.6–4.47)
LYMPHOCYTES NFR BLD AUTO: 32 % (ref 14–44)
MCH RBC QN AUTO: 32.2 PG (ref 26.8–34.3)
MCHC RBC AUTO-ENTMCNC: 33.2 G/DL (ref 31.4–37.4)
MCV RBC AUTO: 97 FL (ref 82–98)
MICROALBUMIN UR-MCNC: 35 MG/L (ref 0–20)
MICROALBUMIN/CREAT 24H UR: 34 MG/G CREATININE (ref 0–30)
MONOCYTES # BLD AUTO: 0.65 THOUSAND/ΜL (ref 0.17–1.22)
MONOCYTES NFR BLD AUTO: 7 % (ref 4–12)
NEUTROPHILS # BLD AUTO: 5.29 THOUSANDS/ΜL (ref 1.85–7.62)
NEUTS SEG NFR BLD AUTO: 56 % (ref 43–75)
NONHDLC SERPL-MCNC: 131 MG/DL
NRBC BLD AUTO-RTO: 0 /100 WBCS
PLATELET # BLD AUTO: 246 THOUSANDS/UL (ref 149–390)
PMV BLD AUTO: 10.3 FL (ref 8.9–12.7)
POTASSIUM SERPL-SCNC: 3.7 MMOL/L (ref 3.5–5.3)
PROT SERPL-MCNC: 6.7 G/DL (ref 6.4–8.2)
RBC # BLD AUTO: 4.81 MILLION/UL (ref 3.81–5.12)
SODIUM SERPL-SCNC: 136 MMOL/L (ref 136–145)
TRIGL SERPL-MCNC: 199 MG/DL
TSH SERPL DL<=0.05 MIU/L-ACNC: 3.59 UIU/ML (ref 0.36–3.74)
WBC # BLD AUTO: 9.3 THOUSAND/UL (ref 4.31–10.16)

## 2021-06-24 PROCEDURE — 3060F POS MICROALBUMINURIA REV: CPT | Performed by: FAMILY MEDICINE

## 2021-06-24 PROCEDURE — 82570 ASSAY OF URINE CREATININE: CPT | Performed by: FAMILY MEDICINE

## 2021-06-24 PROCEDURE — 82043 UR ALBUMIN QUANTITATIVE: CPT | Performed by: FAMILY MEDICINE

## 2021-06-24 PROCEDURE — 80053 COMPREHEN METABOLIC PANEL: CPT

## 2021-06-24 PROCEDURE — 83036 HEMOGLOBIN GLYCOSYLATED A1C: CPT

## 2021-06-24 PROCEDURE — 85025 COMPLETE CBC W/AUTO DIFF WBC: CPT

## 2021-06-24 PROCEDURE — 80061 LIPID PANEL: CPT

## 2021-06-24 PROCEDURE — 84443 ASSAY THYROID STIM HORMONE: CPT

## 2021-06-24 PROCEDURE — 36415 COLL VENOUS BLD VENIPUNCTURE: CPT

## 2021-06-24 PROCEDURE — 3044F HG A1C LEVEL LT 7.0%: CPT | Performed by: FAMILY MEDICINE

## 2021-06-25 ENCOUNTER — OFFICE VISIT (OUTPATIENT)
Dept: FAMILY MEDICINE CLINIC | Facility: CLINIC | Age: 76
End: 2021-06-25
Payer: COMMERCIAL

## 2021-06-25 VITALS
TEMPERATURE: 98.2 F | HEIGHT: 61 IN | BODY MASS INDEX: 30.78 KG/M2 | HEART RATE: 55 BPM | WEIGHT: 163 LBS | OXYGEN SATURATION: 97 % | SYSTOLIC BLOOD PRESSURE: 130 MMHG | DIASTOLIC BLOOD PRESSURE: 80 MMHG

## 2021-06-25 DIAGNOSIS — I10 ESSENTIAL HYPERTENSION: ICD-10-CM

## 2021-06-25 DIAGNOSIS — N39.46 MIXED STRESS AND URGE URINARY INCONTINENCE: ICD-10-CM

## 2021-06-25 DIAGNOSIS — K21.9 GERD WITHOUT ESOPHAGITIS: ICD-10-CM

## 2021-06-25 DIAGNOSIS — J45.21 MILD INTERMITTENT REACTIVE AIRWAY DISEASE WITH ACUTE EXACERBATION: ICD-10-CM

## 2021-06-25 DIAGNOSIS — E11.42 TYPE 2 DIABETES MELLITUS WITH DIABETIC POLYNEUROPATHY, WITHOUT LONG-TERM CURRENT USE OF INSULIN (HCC): ICD-10-CM

## 2021-06-25 DIAGNOSIS — F32.A ANXIETY AND DEPRESSION: ICD-10-CM

## 2021-06-25 DIAGNOSIS — F41.9 ANXIETY AND DEPRESSION: ICD-10-CM

## 2021-06-25 DIAGNOSIS — Z12.11 SCREENING FOR COLORECTAL CANCER: Primary | ICD-10-CM

## 2021-06-25 DIAGNOSIS — Z12.12 SCREENING FOR COLORECTAL CANCER: Primary | ICD-10-CM

## 2021-06-25 DIAGNOSIS — F17.219 CIGARETTE NICOTINE DEPENDENCE WITH NICOTINE-INDUCED DISORDER: ICD-10-CM

## 2021-06-25 PROCEDURE — 3075F SYST BP GE 130 - 139MM HG: CPT | Performed by: FAMILY MEDICINE

## 2021-06-25 PROCEDURE — 3008F BODY MASS INDEX DOCD: CPT | Performed by: FAMILY MEDICINE

## 2021-06-25 PROCEDURE — 1160F RVW MEDS BY RX/DR IN RCRD: CPT | Performed by: FAMILY MEDICINE

## 2021-06-25 PROCEDURE — 99214 OFFICE O/P EST MOD 30 MIN: CPT | Performed by: FAMILY MEDICINE

## 2021-06-25 PROCEDURE — 3079F DIAST BP 80-89 MM HG: CPT | Performed by: FAMILY MEDICINE

## 2021-06-25 RX ORDER — HYDROCHLOROTHIAZIDE 50 MG/1
50 TABLET ORAL DAILY
Qty: 90 TABLET | Refills: 3 | Status: SHIPPED | OUTPATIENT
Start: 2021-06-25 | End: 2021-08-19

## 2021-06-25 RX ORDER — PREGABALIN 200 MG/1
200 CAPSULE ORAL 3 TIMES DAILY
Qty: 90 CAPSULE | Refills: 0 | Status: SHIPPED | OUTPATIENT
Start: 2021-06-25 | End: 2021-09-20 | Stop reason: SDUPTHER

## 2021-06-25 RX ORDER — CLONAZEPAM 1 MG/1
1 TABLET ORAL EVERY EVENING
Qty: 30 TABLET | Refills: 3 | Status: SHIPPED | OUTPATIENT
Start: 2021-06-25 | End: 2021-09-10 | Stop reason: SDUPTHER

## 2021-06-25 RX ORDER — SOLIFENACIN SUCCINATE 5 MG/1
5 TABLET, FILM COATED ORAL DAILY
Qty: 90 TABLET | Refills: 1 | Status: SHIPPED | OUTPATIENT
Start: 2021-06-25 | End: 2022-01-14

## 2021-06-25 NOTE — ASSESSMENT & PLAN NOTE
GERD symptoms without esophagitis stable currently patient doing well with her current diet and continuation of Dexilant 60 mg capsule no change follow-up at next visit

## 2021-06-25 NOTE — PATIENT INSTRUCTIONS
Meal Planning with Diabetes Exchanges   AMBULATORY CARE:   Diabetes exchanges  are servings of food that contain similar amounts of carbohydrate, fat, protein, and calories within a food group  The exchanges can be used to develop a healthy meal plan that helps to keep your blood sugar within the recommended levels  A meal plan with the right amount of carbohydrates is especially important  Your blood sugar naturally rises after you eat carbohydrates  Too many carbohydrates in 1 meal or snack can raise your blood sugar level  Carbohydrates are found in starches, fruit, milk, yogurt, and sweets  Call your doctor if:   · You have high blood sugar levels during a certain time of day, or almost all of the time  · You often have low blood sugar levels  · You have questions or concerns about your condition or care  Create a meal plan with exchanges:  A dietitian will work with you to develop a healthy meal plan that is right for you  This meal plan will include the amount of exchanges you can have from each food group throughout the day  Follow your meal plan by keeping track of the amount of exchanges you eat for each meal and snack  Your meal plan will be based on your age, weight, blood sugar levels, medicine, and activity level  Starch food group exchanges:  Each exchange below contains about 15 grams of carbohydrate , 3 grams of protein, 1 gram of fat, and 80 calories  · 1 ounce of white, whole wheat or rye bread (1 slice)    · 1 ounce of bagel (about ¼ of a bagel)    · 1 6-inch flour or corn tortilla or 1 4-inch pancake (about ¼ inch thick)    · ?  cup of cooked pasta or rice    · ¾ cup of dry, ready-to-eat cereal with no sugar added     · ½ cup of cooked cereal, such as oatmeal    · 3 lavon cracker squares or 8 animal crackers    · 6 saltine-type crackers or     · 3 cups of popcorn or ¾ ounce of pretzels     · Starchy vegetables and cooked legumes:      ? ½ cup of corn, green peas, sweet potatoes, or mashed potatoes     ? ¼ of a large baked potato     ? 1 cup of acorn, butternut squash, or pumpkin     ? ½ cup of beans, lentils, or peas (such as queen, kidney, or black-eyed)    ? ? cup of lima beans    Fruit group exchanges:  Each exchange contains about 15 grams of carbohydrate  and 60 calories  · 1 small (4 ounce) apple, banana orange, or nectarine    · ½ cup of canned or fresh fruit    · ½ cup (4 ounces) of unsweetened fruit juice    · 2 tablespoons of dried fruit    Milk group exchanges:  Each exchange contains about 12 grams of carbohydrate  and 8 grams of protein  The amount of fat and calories in each serving depends on the type of milk (such as whole, low-fat, or fat-free)  · 1 cup fat-free or low-fat milk    · ¾ cup of plain, nonfat yogurt    · 1 cup fat-free, flavored yogurt with artificial (no calorie) sweetener    Non-starchy vegetable group exchanges:  Each exchange contains about 5 grams of carbohydrate , 2 grams of protein, and 25 calories  Examples include beets, broccoli, cabbage, carrots, cauliflower, cucumber, mushrooms, tomatoes, and zucchini  · ½ cup of cooked vegetables or 1 cup of raw vegetables     · ½ cup of vegetable juice    Meat and meat substitute group exchanges:  Each exchange of a lean meat  listed below contains about 7 grams of protein, 0 to 3 grams of fat, and 45 calories  The meat and meat substitutes food group does not contain any carbohydrates  Medium and high-fat meats have more calories  · 1 ounce of chicken or turkey without skin, or 1 ounce of fish (not breaded or fried)     · 1 ounce of lean beef, pork, or lamb     · 1-inch cube or 1 ounce of low-fat cheese     · 2 egg whites or ¼ cup of egg substitute     · ½ cup of tofu    Sweets, desserts, and other carbohydrate group exchanges:   · Sweets and other desserts:  Each exchange has about 15 grams of carbohydrate   ? 1 ounce of karla food cake or 2-inch square cake (unfrosted)    ? 2 small cookies     ?  ½ cup of sugar-free, fat-free ice cream    ? 1 tablespoon of syrup, jam, jelly, table sugar, or honey    · Combination foods:     ? 1 cup of an entrée, such as lasagna, spaghetti with meatballs, macaroni and cheese, and chili with beans (each serving counts as 2 carbohydrate exchanges )     ? 1 cup of tomato or vegetable beef soup (each serving counts as 1 carbohydrate exchange )    Fat group exchanges:  Each exchange contains 5 grams of fat and 45 calories  · 1 teaspoon of oil (such as canola, olive, or corn oil)     · 6 almonds or cashews, 10 peanuts, or 4 pecan halves     · 2 tablespoons of avocado     · ½ tablespoon of peanut butter     · 1 teaspoon of regular margarine or 2 teaspoons of low-fat margarine     · 1 teaspoon of regular butter or 1 tablespoon of low-fat butter     · 1 teaspoon of regular mayonnaise or 1 tablespoon of low-fat mayonnaise     · 1 tablespoon of regular salad dressing or 2 tablespoons of low-fat salad dressing    Free foods: The foods on this list are called free foods because they have very few calories  Free foods usually do not increase your blood sugar if you limit them  · 1 tablespoon of catsup or taco sauce     · ¼ cup of salsa     · 2 tablespoons of sugar-free syrup or 2 teaspoons of light jam or jelly     · 1 tablespoon of fat-free salad dressing     · 4 tablespoons of fat-free margarine or fat-free mayonnaise     · Sugar-free drinks: diet soda, sugar-free drink mixes, or mineral water     · Low-sodium bouillon or fat-free broth     · Mustard     · Seasonings such as spices, herbs, and garlic     · Sugar-free gelatin without added fruit    Other healthy nutrition guidelines:   · Limit drinks with sugar substitutes  Your dietitian or healthcare provider will encourage you to drink water  Water helps your kidneys to function properly  Ask how much water you should drink every day  · Eat more fiber    Choose foods that are good sources of fiber, such as fruits, vegetables, and whole grains  Cereals that contain 5 or more grams of fiber per serving are good sources of fiber  Legumes such as garbanzo, queen beans, kidney beans, and lentils are also good sources  · Limit fat  Ask your dietitian or healthcare provider how much fat you should eat each day  Choose foods low in fat, saturated fat, trans fat, and cholesterol  Examples include turkey or chicken without the skin, fish, lean cuts of meat, and beans  Low-fat dairy foods, such as low-fat or fat-free milk and low-fat yogurt are also good choices  Omega-3 fatty acids are healthy fats that are found in canola oil, soybean oil and fatty fish  Elmer, albacore tuna, and sardines are good sources of omega 3 fatty acids  Eat 2 servings of these types of fish each week  Do not eat fried fish  · Limit sugar  Sugar and sweets must be counted toward the carbohydrate exchanges that you can have within your meal plan  Limit sugar and sweets because they are usually also high in calories and fat  Eat smaller portions of sweets by sharing a dessert or asking for a child-size portion at a restaurant  · Limit sodium  (salt) to about 2,300 mg per day  You may need to eat even less sodium if you have certain medical conditions  Foods high in sodium include soy sauce, potato chips, and soup  · Limit alcohol  Ask your healthcare provider if it is safe for you to drink alcohol  If alcohol is safe for you to have, eat a meal when you drink alcohol  If you drink alcohol on an empty stomach, your blood sugar may drop to a low level  Women 21 years or older and men 72 years or older should limit alcohol to 1 drink a day  Men aged 24 to 59 years should limit alcohol to 2 drinks a day  A drink of alcohol is 5 ounces of wine, 12 ounces of beer, or 1½ ounces of liquor  Other ways to manage your diabetes:   · Control your blood sugar level  Test your blood sugar level regularly and keep a record of the results   Ask your healthcare provider when and how often to test your blood sugar  You may need to check your blood sugar level at least 3 times each day  · Talk to your healthcare provider about your weight  Ask if you need to lose weight, and how much you need to lose  If you are overweight, you may need to make other changes to lose weight  Ask your healthcare provider to help you create a weight loss program      · Get regular physical activity  Physical activity can help decrease your blood sugar level  It can also help to decrease your risk for heart disease and help you lose weight  Adults should have moderate intensity physical activity for at least 150 minutes every week  Spread the amount of activity over at least 3 days a week  Do not skip more than 2 days in a row  Children should get at least 60 minutes of moderate physical activity on most days of the week  Examples of moderate physical activity include brisk walking, running, and swimming  Do not sit for longer than 30 minutes  Work with your healthcare provider to create a plan for physical activity  © Copyright [address] Information is for End User's use only and may not be sold, redistributed or otherwise used for commercial purposes  All illustrations and images included in CareNotes® are the copyrighted property of [employer]  or [address] [place] [address]  The above information is an  only  It is not intended as medical advice for individual conditions or treatments  Talk to your doctor, nurse or pharmacist before following any medical regimen to see if it is safe and effective for you

## 2021-06-25 NOTE — ASSESSMENT & PLAN NOTE
Lab Results   Component Value Date    HGBA1C 6 3 (H) 06/24/2021    A1c at 6 3 patient is stable medically with metformin continue with current diet and laboratory work follow-up with A1c at next visit continue with diabetic diet discuss with dietitian if needed in the future but patient is overall doing well

## 2021-06-25 NOTE — PROGRESS NOTES
Assessment/Plan:       Problem List Items Addressed This Visit        Digestive    GERD without esophagitis     GERD symptoms without esophagitis stable currently patient doing well with her current diet and continuation of Dexilant 60 mg capsule no change follow-up at next visit            Endocrine    Type 2 diabetes mellitus with diabetic polyneuropathy ([facility] [address] )       Lab Results   Component Value Date    HGBA1C 6 3 (H) 06/24/2021    A1c at 6 3 patient is stable medically with metformin continue with current diet and laboratory work follow-up with A1c at next visit continue with diabetic diet discuss with dietitian if needed in the future but patient is overall doing well            Respiratory    Reactive airway disease with acute exacerbation     Stable now on current regimen doing well  Cardiovascular and Mediastinum    Essential hypertension       Other    Anxiety and depression      Other Visit Diagnoses     Screening for colorectal cancer    -  Primary    Mixed stress and urge urinary incontinence                Subjective:      Patient ID: Prashant Li is a 77 y o  female  Patient presents for 4 month follow-up evaluation and review of laboratory work she is doing well overall      The following portions of the patient's history were reviewed and updated as appropriate: allergies, current medications, past family history, past medical history, past social history, past surgical history and problem list     Review of Systems   Constitutional: Negative for chills, fatigue and fever  HENT: Negative for congestion, nosebleeds, rhinorrhea, sinus pressure and sore throat  Eyes: Negative for discharge and redness  Respiratory: Negative for cough and shortness of breath  Cardiovascular: Negative for chest pain, palpitations and leg swelling  Gastrointestinal: Negative for abdominal pain, blood in stool and nausea  Endocrine: Negative for cold intolerance, heat intolerance and polyuria  Genitourinary: Negative for dysuria and frequency  Musculoskeletal: Negative for arthralgias, back pain and myalgias  Skin: Negative for rash  Neurological: Negative for dizziness, weakness and headaches  Hematological: Negative for adenopathy  Psychiatric/Behavioral: Negative for behavioral problems and sleep disturbance  The patient is not nervous/anxious  Objective:      /80 (BP Location: Left arm, Patient Position: Sitting)   Pulse 55   Temp 98 2 °F (36 8 °C)   Ht 5' 1" (1 549 m)   Wt 73 9 kg (163 lb)   SpO2 97%   BMI 30 80 kg/m²        Physical Exam  Vitals and nursing note reviewed  Constitutional:       General: She is not in acute distress  Appearance: Normal appearance  She is well-developed  HENT:      Head: Normocephalic and atraumatic  Right Ear: Tympanic membrane and external ear normal       Left Ear: Tympanic membrane and external ear normal       Nose: Nose normal       Mouth/Throat:      Mouth: Mucous membranes are moist       Pharynx: Oropharynx is clear  No oropharyngeal exudate  Eyes:      General: No scleral icterus  Right eye: No discharge  Left eye: No discharge  Conjunctiva/sclera: Conjunctivae normal       Pupils: Pupils are equal, round, and reactive to light  Neck:      Thyroid: No thyromegaly  Vascular: No JVD  Cardiovascular:      Rate and Rhythm: Normal rate and regular rhythm  Pulses: Normal pulses  Heart sounds: Normal heart sounds  No murmur heard  Pulmonary:      Effort: Pulmonary effort is normal       Breath sounds: No wheezing or rales  Chest:      Chest wall: No tenderness  Abdominal:      General: Bowel sounds are normal  There is no distension  Palpations: Abdomen is soft  There is no mass  Tenderness: There is no abdominal tenderness  Musculoskeletal:         General: No tenderness or deformity  Normal range of motion  Cervical back: Normal range of motion  Lymphadenopathy:      Cervical: No cervical adenopathy  Skin:     General: Skin is warm and dry  Capillary Refill: Capillary refill takes less than 2 seconds  Findings: No rash  Neurological:      General: No focal deficit present  Mental Status: She is alert and oriented to person, place, and time  Mental status is at baseline  Cranial Nerves: No cranial nerve deficit  Coordination: Coordination normal       Deep Tendon Reflexes: Reflexes are normal and symmetric  Reflexes normal    Psychiatric:         Mood and Affect: Mood normal          Behavior: Behavior normal          Thought Content: Thought content normal          Judgment: Judgment normal           Data:    Laboratory Results: I have personally reviewed the pertinent laboratory results/reports   Radiology/Other Diagnostic Testing Results: I have personally reviewed pertinent reports         Lab Results   Component Value Date    WBC 9 30 06/24/2021    HGB 15 5 (H) 06/24/2021    HCT 46 7 (H) 06/24/2021    MCV 97 06/24/2021     06/24/2021     Lab Results   Component Value Date     05/21/2018    K 3 7 06/24/2021    CL 97 (L) 06/24/2021    CO2 32 06/24/2021    ANIONGAP 14 0 05/21/2018    BUN 10 06/24/2021    CREATININE 0 46 (L) 06/24/2021    GLUF 122 (H) 06/24/2021    CALCIUM 9 9 06/24/2021    AST 8 06/24/2021    ALT 14 06/24/2021    ALKPHOS 53 06/24/2021    PROT 6 4 05/21/2018    BILITOT 0 4 05/21/2018    EGFR 98 06/24/2021     Lab Results   Component Value Date    CHOLESTEROL 178 06/24/2021    CHOLESTEROL 156 02/20/2021    CHOLESTEROL 200 10/01/2020     Lab Results   Component Value Date    HDL 47 06/24/2021    HDL 46 02/20/2021    HDL 50 10/01/2020     Lab Results   Component Value Date    LDLCALC 91 06/24/2021    LDLCALC 72 02/20/2021    LDLCALC 116 (H) 10/01/2020     Lab Results   Component Value Date    TRIG 199 (H) 06/24/2021    TRIG 192 (H) 02/20/2021    TRIG 171 (H) 10/01/2020     No results found for: [place], [place]  Lab Results   Component Value Date    WBB2XBFWSYNJ 3 590 06/24/2021     Lab Results   Component Value Date    HGBA1C 6 3 (H) 06/24/2021     No results found for: DOE Ansari DO

## 2021-06-25 NOTE — PROGRESS NOTES
BMI Counseling: Body mass index is 30 8 kg/m²  The BMI is above normal  Nutrition recommendations include reducing portion sizes, decreasing overall calorie intake, 3-5 servings of fruits/vegetables daily, reducing fast food intake, consuming healthier snacks, decreasing soda and/or juice intake, moderation in carbohydrate intake, increasing intake of lean protein, reducing intake of saturated fat and trans fat and reducing intake of cholesterol  Exercise recommendations include exercising 3-5 times per week

## 2021-08-03 ENCOUNTER — VBI (OUTPATIENT)
Dept: ADMINISTRATIVE | Facility: OTHER | Age: 76
End: 2021-08-03

## 2021-08-11 DIAGNOSIS — E11.65 UNCONTROLLED TYPE 2 DIABETES MELLITUS WITH HYPERGLYCEMIA (HCC): ICD-10-CM

## 2021-08-11 RX ORDER — METFORMIN HYDROCHLORIDE 500 MG/1
TABLET, EXTENDED RELEASE ORAL
Qty: 360 TABLET | Refills: 2 | Status: SHIPPED | OUTPATIENT
Start: 2021-08-11 | End: 2021-10-27 | Stop reason: SDUPTHER

## 2021-08-19 DIAGNOSIS — I10 ESSENTIAL HYPERTENSION: ICD-10-CM

## 2021-08-19 RX ORDER — HYDROCHLOROTHIAZIDE 50 MG/1
TABLET ORAL
Qty: 90 TABLET | Refills: 3 | Status: SHIPPED | OUTPATIENT
Start: 2021-08-19

## 2021-09-10 DIAGNOSIS — I10 ESSENTIAL HYPERTENSION: ICD-10-CM

## 2021-09-10 DIAGNOSIS — F32.A ANXIETY AND DEPRESSION: ICD-10-CM

## 2021-09-10 DIAGNOSIS — F41.9 ANXIETY AND DEPRESSION: ICD-10-CM

## 2021-09-10 RX ORDER — CLONAZEPAM 1 MG/1
1 TABLET ORAL EVERY EVENING
Qty: 30 TABLET | Refills: 3 | Status: SHIPPED | OUTPATIENT
Start: 2021-09-10 | End: 2021-12-15 | Stop reason: SDUPTHER

## 2021-09-10 RX ORDER — POTASSIUM CHLORIDE 750 MG/1
TABLET, FILM COATED, EXTENDED RELEASE ORAL
Qty: 180 TABLET | Refills: 1 | Status: SHIPPED | OUTPATIENT
Start: 2021-09-10 | End: 2022-04-07 | Stop reason: SDUPTHER

## 2021-09-20 DIAGNOSIS — E11.42 TYPE 2 DIABETES MELLITUS WITH DIABETIC POLYNEUROPATHY, WITHOUT LONG-TERM CURRENT USE OF INSULIN (HCC): ICD-10-CM

## 2021-09-20 RX ORDER — PREGABALIN 200 MG/1
200 CAPSULE ORAL 3 TIMES DAILY
Qty: 90 CAPSULE | Refills: 0 | Status: SHIPPED | OUTPATIENT
Start: 2021-09-20 | End: 2021-10-27 | Stop reason: SDUPTHER

## 2021-09-28 ENCOUNTER — APPOINTMENT (OUTPATIENT)
Dept: LAB | Facility: CLINIC | Age: 76
End: 2021-09-28
Payer: COMMERCIAL

## 2021-09-28 DIAGNOSIS — Z12.12 SCREENING FOR COLORECTAL CANCER: ICD-10-CM

## 2021-09-28 DIAGNOSIS — F41.9 ANXIETY AND DEPRESSION: ICD-10-CM

## 2021-09-28 DIAGNOSIS — J45.21 MILD INTERMITTENT REACTIVE AIRWAY DISEASE WITH ACUTE EXACERBATION: ICD-10-CM

## 2021-09-28 DIAGNOSIS — Z12.11 SCREENING FOR COLORECTAL CANCER: ICD-10-CM

## 2021-09-28 DIAGNOSIS — F17.219 CIGARETTE NICOTINE DEPENDENCE WITH NICOTINE-INDUCED DISORDER: ICD-10-CM

## 2021-09-28 DIAGNOSIS — K21.9 GERD WITHOUT ESOPHAGITIS: ICD-10-CM

## 2021-09-28 DIAGNOSIS — I10 ESSENTIAL HYPERTENSION: ICD-10-CM

## 2021-09-28 DIAGNOSIS — E11.42 TYPE 2 DIABETES MELLITUS WITH DIABETIC POLYNEUROPATHY, WITHOUT LONG-TERM CURRENT USE OF INSULIN (HCC): ICD-10-CM

## 2021-09-28 DIAGNOSIS — N39.46 MIXED STRESS AND URGE URINARY INCONTINENCE: ICD-10-CM

## 2021-09-28 DIAGNOSIS — F32.A ANXIETY AND DEPRESSION: ICD-10-CM

## 2021-09-28 LAB
ALBUMIN SERPL BCP-MCNC: 3.8 G/DL (ref 3.5–5)
ALP SERPL-CCNC: 59 U/L (ref 46–116)
ALT SERPL W P-5'-P-CCNC: 20 U/L (ref 12–78)
ANION GAP SERPL CALCULATED.3IONS-SCNC: 7 MMOL/L (ref 4–13)
AST SERPL W P-5'-P-CCNC: 20 U/L (ref 5–45)
BASOPHILS # BLD AUTO: 0.05 THOUSANDS/ΜL (ref 0–0.1)
BASOPHILS NFR BLD AUTO: 1 % (ref 0–1)
BILIRUB SERPL-MCNC: 0.55 MG/DL (ref 0.2–1)
BUN SERPL-MCNC: 8 MG/DL (ref 5–25)
CALCIUM SERPL-MCNC: 9.9 MG/DL (ref 8.3–10.1)
CHLORIDE SERPL-SCNC: 95 MMOL/L (ref 100–108)
CHOLEST SERPL-MCNC: 179 MG/DL (ref 50–200)
CO2 SERPL-SCNC: 27 MMOL/L (ref 21–32)
CREAT SERPL-MCNC: 0.6 MG/DL (ref 0.6–1.3)
EOSINOPHIL # BLD AUTO: 0.21 THOUSAND/ΜL (ref 0–0.61)
EOSINOPHIL NFR BLD AUTO: 3 % (ref 0–6)
ERYTHROCYTE [DISTWIDTH] IN BLOOD BY AUTOMATED COUNT: 12.8 % (ref 11.6–15.1)
EST. AVERAGE GLUCOSE BLD GHB EST-MCNC: 128 MG/DL
GFR SERPL CREATININE-BSD FRML MDRD: 89 ML/MIN/1.73SQ M
GLUCOSE P FAST SERPL-MCNC: 166 MG/DL (ref 65–99)
HBA1C MFR BLD: 6.1 %
HCT VFR BLD AUTO: 49.7 % (ref 34.8–46.1)
HDLC SERPL-MCNC: 40 MG/DL
HGB BLD-MCNC: 16.9 G/DL (ref 11.5–15.4)
IMM GRANULOCYTES # BLD AUTO: 0.03 THOUSAND/UL (ref 0–0.2)
IMM GRANULOCYTES NFR BLD AUTO: 0 % (ref 0–2)
LDLC SERPL CALC-MCNC: 95 MG/DL (ref 0–100)
LYMPHOCYTES # BLD AUTO: 2.3 THOUSANDS/ΜL (ref 0.6–4.47)
LYMPHOCYTES NFR BLD AUTO: 28 % (ref 14–44)
MCH RBC QN AUTO: 31.5 PG (ref 26.8–34.3)
MCHC RBC AUTO-ENTMCNC: 34 G/DL (ref 31.4–37.4)
MCV RBC AUTO: 93 FL (ref 82–98)
MONOCYTES # BLD AUTO: 0.41 THOUSAND/ΜL (ref 0.17–1.22)
MONOCYTES NFR BLD AUTO: 5 % (ref 4–12)
NEUTROPHILS # BLD AUTO: 5.35 THOUSANDS/ΜL (ref 1.85–7.62)
NEUTS SEG NFR BLD AUTO: 63 % (ref 43–75)
NONHDLC SERPL-MCNC: 139 MG/DL
NRBC BLD AUTO-RTO: 0 /100 WBCS
PLATELET # BLD AUTO: 296 THOUSANDS/UL (ref 149–390)
PMV BLD AUTO: 9.8 FL (ref 8.9–12.7)
POTASSIUM SERPL-SCNC: 3.8 MMOL/L (ref 3.5–5.3)
PROT SERPL-MCNC: 7.1 G/DL (ref 6.4–8.2)
RBC # BLD AUTO: 5.37 MILLION/UL (ref 3.81–5.12)
SODIUM SERPL-SCNC: 129 MMOL/L (ref 136–145)
TRIGL SERPL-MCNC: 220 MG/DL
TSH SERPL DL<=0.05 MIU/L-ACNC: 3.18 UIU/ML (ref 0.36–3.74)
WBC # BLD AUTO: 8.35 THOUSAND/UL (ref 4.31–10.16)

## 2021-09-28 PROCEDURE — 84443 ASSAY THYROID STIM HORMONE: CPT

## 2021-09-28 PROCEDURE — 80061 LIPID PANEL: CPT

## 2021-09-28 PROCEDURE — 80053 COMPREHEN METABOLIC PANEL: CPT

## 2021-09-28 PROCEDURE — 83036 HEMOGLOBIN GLYCOSYLATED A1C: CPT

## 2021-09-28 PROCEDURE — 36415 COLL VENOUS BLD VENIPUNCTURE: CPT

## 2021-09-28 PROCEDURE — 85025 COMPLETE CBC W/AUTO DIFF WBC: CPT

## 2021-10-04 ENCOUNTER — TELEPHONE (OUTPATIENT)
Dept: ADMINISTRATIVE | Facility: OTHER | Age: 76
End: 2021-10-04

## 2021-10-18 DIAGNOSIS — F32.A ANXIETY AND DEPRESSION: ICD-10-CM

## 2021-10-18 DIAGNOSIS — F41.9 ANXIETY AND DEPRESSION: ICD-10-CM

## 2021-10-18 RX ORDER — BUPROPION HYDROCHLORIDE 300 MG/1
TABLET ORAL
Qty: 90 TABLET | Refills: 1 | Status: SHIPPED | OUTPATIENT
Start: 2021-10-18 | End: 2021-10-27 | Stop reason: SDUPTHER

## 2021-10-27 ENCOUNTER — OFFICE VISIT (OUTPATIENT)
Dept: FAMILY MEDICINE CLINIC | Facility: CLINIC | Age: 76
End: 2021-10-27
Payer: COMMERCIAL

## 2021-10-27 VITALS
BODY MASS INDEX: 32.25 KG/M2 | TEMPERATURE: 98.6 F | DIASTOLIC BLOOD PRESSURE: 88 MMHG | HEART RATE: 76 BPM | OXYGEN SATURATION: 96 % | WEIGHT: 170.8 LBS | RESPIRATION RATE: 18 BRPM | SYSTOLIC BLOOD PRESSURE: 120 MMHG | HEIGHT: 61 IN

## 2021-10-27 DIAGNOSIS — Z00.00 MEDICARE ANNUAL WELLNESS VISIT, SUBSEQUENT: ICD-10-CM

## 2021-10-27 DIAGNOSIS — K21.9 GERD WITHOUT ESOPHAGITIS: ICD-10-CM

## 2021-10-27 DIAGNOSIS — I10 ESSENTIAL HYPERTENSION: ICD-10-CM

## 2021-10-27 DIAGNOSIS — E11.65 UNCONTROLLED TYPE 2 DIABETES MELLITUS WITH HYPERGLYCEMIA (HCC): ICD-10-CM

## 2021-10-27 DIAGNOSIS — F41.9 ANXIETY AND DEPRESSION: ICD-10-CM

## 2021-10-27 DIAGNOSIS — J45.21 MILD INTERMITTENT REACTIVE AIRWAY DISEASE WITH ACUTE EXACERBATION: Primary | ICD-10-CM

## 2021-10-27 DIAGNOSIS — Z23 ENCOUNTER FOR IMMUNIZATION: ICD-10-CM

## 2021-10-27 DIAGNOSIS — E11.42 TYPE 2 DIABETES MELLITUS WITH DIABETIC POLYNEUROPATHY, WITHOUT LONG-TERM CURRENT USE OF INSULIN (HCC): ICD-10-CM

## 2021-10-27 DIAGNOSIS — F32.A ANXIETY AND DEPRESSION: ICD-10-CM

## 2021-10-27 PROCEDURE — G0008 ADMIN INFLUENZA VIRUS VAC: HCPCS | Performed by: FAMILY MEDICINE

## 2021-10-27 PROCEDURE — 1170F FXNL STATUS ASSESSED: CPT | Performed by: FAMILY MEDICINE

## 2021-10-27 PROCEDURE — 90662 IIV NO PRSV INCREASED AG IM: CPT | Performed by: FAMILY MEDICINE

## 2021-10-27 PROCEDURE — 1101F PT FALLS ASSESS-DOCD LE1/YR: CPT | Performed by: FAMILY MEDICINE

## 2021-10-27 PROCEDURE — 3074F SYST BP LT 130 MM HG: CPT | Performed by: FAMILY MEDICINE

## 2021-10-27 PROCEDURE — 1160F RVW MEDS BY RX/DR IN RCRD: CPT | Performed by: FAMILY MEDICINE

## 2021-10-27 PROCEDURE — 3725F SCREEN DEPRESSION PERFORMED: CPT | Performed by: FAMILY MEDICINE

## 2021-10-27 PROCEDURE — 3008F BODY MASS INDEX DOCD: CPT | Performed by: FAMILY MEDICINE

## 2021-10-27 PROCEDURE — 1125F AMNT PAIN NOTED PAIN PRSNT: CPT | Performed by: FAMILY MEDICINE

## 2021-10-27 PROCEDURE — 3288F FALL RISK ASSESSMENT DOCD: CPT | Performed by: FAMILY MEDICINE

## 2021-10-27 PROCEDURE — G0439 PPPS, SUBSEQ VISIT: HCPCS | Performed by: FAMILY MEDICINE

## 2021-10-27 PROCEDURE — 3079F DIAST BP 80-89 MM HG: CPT | Performed by: FAMILY MEDICINE

## 2021-10-27 PROCEDURE — 99214 OFFICE O/P EST MOD 30 MIN: CPT | Performed by: FAMILY MEDICINE

## 2021-10-27 RX ORDER — METFORMIN HYDROCHLORIDE 500 MG/1
1000 TABLET, EXTENDED RELEASE ORAL 2 TIMES DAILY
Qty: 360 TABLET | Refills: 2 | Status: SHIPPED | OUTPATIENT
Start: 2021-10-27 | End: 2022-08-03

## 2021-10-27 RX ORDER — BUPROPION HYDROCHLORIDE 300 MG/1
300 TABLET ORAL DAILY
Qty: 90 TABLET | Refills: 1 | Status: SHIPPED | OUTPATIENT
Start: 2021-10-27 | End: 2022-05-06

## 2021-10-27 RX ORDER — LANCETS 33 GAUGE
EACH MISCELLANEOUS
Qty: 100 EACH | Refills: 5 | Status: SHIPPED | OUTPATIENT
Start: 2021-10-27

## 2021-10-27 RX ORDER — PREGABALIN 200 MG/1
200 CAPSULE ORAL 3 TIMES DAILY
Qty: 90 CAPSULE | Refills: 0 | Status: SHIPPED | OUTPATIENT
Start: 2021-10-27 | End: 2022-05-06

## 2021-10-27 RX ORDER — BLOOD SUGAR DIAGNOSTIC
STRIP MISCELLANEOUS
Qty: 100 EACH | Refills: 5 | Status: SHIPPED | OUTPATIENT
Start: 2021-10-27

## 2021-12-15 DIAGNOSIS — F32.A ANXIETY AND DEPRESSION: ICD-10-CM

## 2021-12-15 DIAGNOSIS — F41.9 ANXIETY AND DEPRESSION: ICD-10-CM

## 2021-12-15 DIAGNOSIS — K21.9 GERD WITHOUT ESOPHAGITIS: ICD-10-CM

## 2021-12-15 RX ORDER — CLONAZEPAM 1 MG/1
1 TABLET ORAL EVERY EVENING
Qty: 30 TABLET | Refills: 3 | Status: SHIPPED | OUTPATIENT
Start: 2021-12-15 | End: 2022-05-20

## 2021-12-15 RX ORDER — PANTOPRAZOLE SODIUM 40 MG/1
40 TABLET, DELAYED RELEASE ORAL DAILY
Qty: 90 TABLET | Refills: 3 | Status: SHIPPED | OUTPATIENT
Start: 2021-12-15

## 2022-01-14 DIAGNOSIS — N39.46 MIXED STRESS AND URGE URINARY INCONTINENCE: ICD-10-CM

## 2022-01-14 RX ORDER — SOLIFENACIN SUCCINATE 5 MG/1
TABLET, FILM COATED ORAL
Qty: 90 TABLET | Refills: 1 | Status: SHIPPED | OUTPATIENT
Start: 2022-01-14 | End: 2022-08-03

## 2022-02-23 ENCOUNTER — RA CDI HCC (OUTPATIENT)
Dept: OTHER | Facility: HOSPITAL | Age: 77
End: 2022-02-23

## 2022-02-23 NOTE — PROGRESS NOTES
[facility]  coding opportunities       Chart reviewed, no opportunity found: CHART REVIEWED, NO OPPORTUNITY FOUND                        Patients insurance company: [employer]

## 2022-03-01 ENCOUNTER — APPOINTMENT (OUTPATIENT)
Dept: LAB | Facility: CLINIC | Age: 77
End: 2022-03-01
Payer: COMMERCIAL

## 2022-03-01 DIAGNOSIS — E11.42 TYPE 2 DIABETES MELLITUS WITH DIABETIC POLYNEUROPATHY, WITHOUT LONG-TERM CURRENT USE OF INSULIN (HCC): ICD-10-CM

## 2022-03-01 DIAGNOSIS — F32.A ANXIETY AND DEPRESSION: ICD-10-CM

## 2022-03-01 DIAGNOSIS — K21.9 GERD WITHOUT ESOPHAGITIS: ICD-10-CM

## 2022-03-01 DIAGNOSIS — E11.65 UNCONTROLLED TYPE 2 DIABETES MELLITUS WITH HYPERGLYCEMIA (HCC): ICD-10-CM

## 2022-03-01 DIAGNOSIS — I10 ESSENTIAL HYPERTENSION: ICD-10-CM

## 2022-03-01 DIAGNOSIS — F41.9 ANXIETY AND DEPRESSION: ICD-10-CM

## 2022-03-01 DIAGNOSIS — Z00.00 MEDICARE ANNUAL WELLNESS VISIT, SUBSEQUENT: ICD-10-CM

## 2022-03-01 DIAGNOSIS — J45.21 MILD INTERMITTENT REACTIVE AIRWAY DISEASE WITH ACUTE EXACERBATION: ICD-10-CM

## 2022-03-01 LAB
ALBUMIN SERPL BCP-MCNC: 3.9 G/DL (ref 3.5–5)
ALP SERPL-CCNC: 56 U/L (ref 46–116)
ALT SERPL W P-5'-P-CCNC: 16 U/L (ref 12–78)
ANION GAP SERPL CALCULATED.3IONS-SCNC: 7 MMOL/L (ref 4–13)
AST SERPL W P-5'-P-CCNC: 14 U/L (ref 5–45)
BASOPHILS # BLD AUTO: 0.07 THOUSANDS/ΜL (ref 0–0.1)
BASOPHILS NFR BLD AUTO: 1 % (ref 0–1)
BILIRUB SERPL-MCNC: 0.34 MG/DL (ref 0.2–1)
BUN SERPL-MCNC: 9 MG/DL (ref 5–25)
CALCIUM SERPL-MCNC: 10.1 MG/DL (ref 8.3–10.1)
CHLORIDE SERPL-SCNC: 92 MMOL/L (ref 100–108)
CHOLEST SERPL-MCNC: 179 MG/DL
CO2 SERPL-SCNC: 31 MMOL/L (ref 21–32)
CREAT SERPL-MCNC: 0.57 MG/DL (ref 0.6–1.3)
EOSINOPHIL # BLD AUTO: 0.29 THOUSAND/ΜL (ref 0–0.61)
EOSINOPHIL NFR BLD AUTO: 3 % (ref 0–6)
ERYTHROCYTE [DISTWIDTH] IN BLOOD BY AUTOMATED COUNT: 12.8 % (ref 11.6–15.1)
GFR SERPL CREATININE-BSD FRML MDRD: 90 ML/MIN/1.73SQ M
GLUCOSE P FAST SERPL-MCNC: 135 MG/DL (ref 65–99)
HCT VFR BLD AUTO: 47.6 % (ref 34.8–46.1)
HDLC SERPL-MCNC: 47 MG/DL
HGB BLD-MCNC: 16.4 G/DL (ref 11.5–15.4)
IMM GRANULOCYTES # BLD AUTO: 0.02 THOUSAND/UL (ref 0–0.2)
IMM GRANULOCYTES NFR BLD AUTO: 0 % (ref 0–2)
LDLC SERPL CALC-MCNC: 83 MG/DL (ref 0–100)
LYMPHOCYTES # BLD AUTO: 3.51 THOUSANDS/ΜL (ref 0.6–4.47)
LYMPHOCYTES NFR BLD AUTO: 33 % (ref 14–44)
MCH RBC QN AUTO: 31.4 PG (ref 26.8–34.3)
MCHC RBC AUTO-ENTMCNC: 34.5 G/DL (ref 31.4–37.4)
MCV RBC AUTO: 91 FL (ref 82–98)
MONOCYTES # BLD AUTO: 0.64 THOUSAND/ΜL (ref 0.17–1.22)
MONOCYTES NFR BLD AUTO: 6 % (ref 4–12)
NEUTROPHILS # BLD AUTO: 6.25 THOUSANDS/ΜL (ref 1.85–7.62)
NEUTS SEG NFR BLD AUTO: 57 % (ref 43–75)
NONHDLC SERPL-MCNC: 132 MG/DL
NRBC BLD AUTO-RTO: 0 /100 WBCS
PLATELET # BLD AUTO: 272 THOUSANDS/UL (ref 149–390)
PMV BLD AUTO: 10.2 FL (ref 8.9–12.7)
POTASSIUM SERPL-SCNC: 3.2 MMOL/L (ref 3.5–5.3)
PROT SERPL-MCNC: 7.1 G/DL (ref 6.4–8.2)
RBC # BLD AUTO: 5.22 MILLION/UL (ref 3.81–5.12)
SODIUM SERPL-SCNC: 130 MMOL/L (ref 136–145)
T4 FREE SERPL-MCNC: 0.99 NG/DL (ref 0.76–1.46)
TRIGL SERPL-MCNC: 247 MG/DL
TSH SERPL DL<=0.05 MIU/L-ACNC: 4.28 UIU/ML (ref 0.36–3.74)
WBC # BLD AUTO: 10.78 THOUSAND/UL (ref 4.31–10.16)

## 2022-03-01 PROCEDURE — 80053 COMPREHEN METABOLIC PANEL: CPT

## 2022-03-01 PROCEDURE — 80061 LIPID PANEL: CPT

## 2022-03-01 PROCEDURE — 84439 ASSAY OF FREE THYROXINE: CPT

## 2022-03-01 PROCEDURE — 83036 HEMOGLOBIN GLYCOSYLATED A1C: CPT

## 2022-03-01 PROCEDURE — 84443 ASSAY THYROID STIM HORMONE: CPT

## 2022-03-01 PROCEDURE — 85025 COMPLETE CBC W/AUTO DIFF WBC: CPT

## 2022-03-01 PROCEDURE — 36415 COLL VENOUS BLD VENIPUNCTURE: CPT

## 2022-03-02 ENCOUNTER — OFFICE VISIT (OUTPATIENT)
Dept: FAMILY MEDICINE CLINIC | Facility: CLINIC | Age: 77
End: 2022-03-02
Payer: COMMERCIAL

## 2022-03-02 VITALS
TEMPERATURE: 97.8 F | SYSTOLIC BLOOD PRESSURE: 124 MMHG | DIASTOLIC BLOOD PRESSURE: 70 MMHG | BODY MASS INDEX: 31.3 KG/M2 | RESPIRATION RATE: 18 BRPM | HEART RATE: 84 BPM | WEIGHT: 165.8 LBS | OXYGEN SATURATION: 97 % | HEIGHT: 61 IN

## 2022-03-02 DIAGNOSIS — F32.A ANXIETY AND DEPRESSION: ICD-10-CM

## 2022-03-02 DIAGNOSIS — F41.9 ANXIETY AND DEPRESSION: ICD-10-CM

## 2022-03-02 DIAGNOSIS — I10 ESSENTIAL HYPERTENSION: ICD-10-CM

## 2022-03-02 DIAGNOSIS — Z12.12 SCREENING FOR COLORECTAL CANCER: Primary | ICD-10-CM

## 2022-03-02 DIAGNOSIS — E11.42 TYPE 2 DIABETES MELLITUS WITH DIABETIC POLYNEUROPATHY, WITHOUT LONG-TERM CURRENT USE OF INSULIN (HCC): ICD-10-CM

## 2022-03-02 DIAGNOSIS — Z12.11 SCREENING FOR COLORECTAL CANCER: Primary | ICD-10-CM

## 2022-03-02 DIAGNOSIS — K21.9 GERD WITHOUT ESOPHAGITIS: ICD-10-CM

## 2022-03-02 LAB
EST. AVERAGE GLUCOSE BLD GHB EST-MCNC: 131 MG/DL
HBA1C MFR BLD: 6.2 %

## 2022-03-02 PROCEDURE — 1160F RVW MEDS BY RX/DR IN RCRD: CPT | Performed by: FAMILY MEDICINE

## 2022-03-02 PROCEDURE — 3044F HG A1C LEVEL LT 7.0%: CPT | Performed by: FAMILY MEDICINE

## 2022-03-02 PROCEDURE — 3078F DIAST BP <80 MM HG: CPT | Performed by: FAMILY MEDICINE

## 2022-03-02 PROCEDURE — 3074F SYST BP LT 130 MM HG: CPT | Performed by: FAMILY MEDICINE

## 2022-03-02 PROCEDURE — 99214 OFFICE O/P EST MOD 30 MIN: CPT | Performed by: FAMILY MEDICINE

## 2022-03-02 NOTE — ASSESSMENT & PLAN NOTE
Longstanding anxiety depression stable with current medication regimen follow-up at next visit in 3 months

## 2022-03-02 NOTE — ASSESSMENT & PLAN NOTE
Essential hypertension under stable control continue with current medication regimen now blood pressure 124/70 avoid sodium follow-up in 3 months

## 2022-03-02 NOTE — PATIENT INSTRUCTIONS
10% - bad control"> 10% - bad control,Hemoglobin A1c (HbA1c) greater than 10% indicating poor diabetic control,Haemoglobin A1c greater than 10% indicating poor diabetic control">   Diabetes Mellitus Type 2 in Adults, Ambulatory Care   GENERAL INFORMATION:   Diabetes mellitus type 2  is a disease that affects how your body uses glucose (sugar)  Insulin helps move sugar out of the blood so it can be used for energy  Normally, when the blood sugar level increases, the pancreas makes more insulin  Type 2 diabetes develops because either the body cannot make enough insulin, or it cannot use the insulin correctly  After many years, your pancreas may stop making insulin  Common symptoms include the following:   · More hunger or thirst than usual     · Frequent urination     · Weight loss without trying     · Blurred vision  Seek immediate care for the following symptoms:   · Severe abdominal pain, or pain that spreads to your back  You may also be vomiting  · Trouble staying awake or focusing    · Shaking or sweating    · Blurred or double vision    · Breath has a fruity, sweet smell    · Breathing is deep and labored, or rapid and shallow    · Heartbeat is fast and weak  Treatment for diabetes mellitus type 2  includes keeping your blood sugar at a normal level  You must eat the right foods, and exercise regularly  You may also need medicine if you cannot control your blood sugar level with nutrition and exercise  Manage diabetes mellitus type 2:   · Check your blood sugar level  You will be taught how to check a small drop of blood in a glucose monitor  Ask your healthcare provider when and how often to check during the day  Ask your healthcare provider what your blood sugar levels should be when you check them  · Keep track of carbohydrates (sugar and starchy foods)  Your blood sugar level can get too high if you eat too many carbohydrates   Your dietitian will help you plan meals and snacks that have the right amount of carbohydrates  · Eat low-fat foods  Some examples are skinless chicken and low-fat milk  · Eat less sodium (salt)  Some examples of high-sodium foods to limit are soy sauce, potato chips, and soup  Do not add salt to food you cook  Limit your use of table salt  · Eat high-fiber foods  Foods that are a good source of fiber include vegetables, whole grain bread, and beans  · Limit alcohol  Alcohol affects your blood sugar level and can make it harder to manage your diabetes  Women should limit alcohol to 1 drink a day  Men should limit alcohol to 2 drinks a day  A drink of alcohol is 12 ounces of beer, 5 ounces of wine, or 1½ ounces of liquor  · Get regular exercise  Exercise can help keep your blood sugar level steady, decrease your risk of heart disease, and help you lose weight  Exercise for at least 30 minutes, 5 days a week  Include muscle strengthening activities 2 days each week  Work with your healthcare provider to create an exercise plan  · Check your feet each day  for injuries or open sores  Ask your healthcare provider for activities you can do if you have an open sore  · Quit smoking  If you smoke, it is never too late to quit  Smoking can worsen the problems that may occur with diabetes  Ask your healthcare provider for information about how to stop smoking if you are having trouble quitting  · Ask about your weight:  Ask healthcare providers if you need to lose weight, and how much to lose  Ask them to help you with a weight loss program  Even a 10 to 15 pound weight loss can help you manage your blood sugar level  · Carry medical alert identification  Wear medical alert jewelry or carry a card that says you have diabetes  Ask your healthcare provider where to get these items  · Ask about vaccines  Diabetes puts you at risk of serious illness if you get the flu, pneumonia, or hepatitis   Ask your healthcare provider if you should get a flu, pneumonia, or hepatitis B vaccine, and when to get the vaccine  Follow up with your healthcare provider as directed:  Write down your questions so you remember to ask them during your visits  CARE AGREEMENT:   You have the right to help plan your care  Learn about your health condition and how it may be treated  Discuss treatment options with your caregivers to decide what care you want to receive  You always have the right to refuse treatment  The above information is an  only  It is not intended as medical advice for individual conditions or treatments  Talk to your doctor, nurse or pharmacist before following any medical regimen to see if it is safe and effective for you  © 2014 [address] is for End User's use only and may not be sold, redistributed or otherwise used for commercial purposes  All illustrations and images included in CareNotes® are the copyrighted property of [employer] Inc  or Nicola Hernández

## 2022-03-02 NOTE — PROGRESS NOTES
Assessment/Plan:       Problem List Items Addressed This Visit        Digestive    GERD without esophagitis     GERD symptoms stable no recent heartburn or worsening  Indigestion continue on Dexilant 60 milligram capsules         Relevant Orders    DXA bone density spine hip and pelvis    Hemoglobin A1C ([facility])    Ambulatory Referral to Ophthalmology    Hemoglobin A1C    Comprehensive metabolic panel    Lipid Panel with Direct LDL reflex    Microalbumin / creatinine urine ratio    CBC and Platelet    TSH, 3rd generation with Free T4 reflex       Endocrine    Type 2 diabetes mellitus with diabetic polyneuropathy ([facility] [address] )       Lab Results   Component Value Date    HGBA1C 6 1 (H) 09/28/2021   Patient is doing well overall with A1c at 6 1 now continuing with metformin monitoring diet avoiding excess carbohydrates and concentrated sweets her weight is down 5 pound since October    I would like to encourage her to continue with weight reduction diet and exercise and follow up at her next visit here in 3 months         Relevant Orders    DXA bone density spine hip and pelvis    Hemoglobin A1C ([facility])    Ambulatory Referral to Ophthalmology    Hemoglobin A1C    Comprehensive metabolic panel    Lipid Panel with Direct LDL reflex    Microalbumin / creatinine urine ratio    CBC and Platelet    TSH, 3rd generation with Free T4 reflex       Cardiovascular and Mediastinum    Essential hypertension     Essential hypertension under stable control continue with current medication regimen now blood pressure 124/70 avoid sodium follow-up in 3 months         Relevant Orders    DXA bone density spine hip and pelvis    Hemoglobin A1C ([facility])    Ambulatory Referral to Ophthalmology    Hemoglobin A1C    Comprehensive metabolic panel    Lipid Panel with Direct LDL reflex    Microalbumin / creatinine urine ratio    CBC and Platelet    TSH, 3rd generation with Free T4 reflex       Other    Anxiety and depression Longstanding anxiety depression stable with current medication regimen follow-up at next visit in 3 months         Relevant Orders    DXA bone density spine hip and pelvis    Hemoglobin A1C ([facility])    Ambulatory Referral to Ophthalmology    Hemoglobin A1C    Comprehensive metabolic panel    Lipid Panel with Direct LDL reflex    Microalbumin / creatinine urine ratio    CBC and Platelet    TSH, 3rd generation with Free T4 reflex      Other Visit Diagnoses     Screening for colorectal cancer    -  Primary    Relevant Orders    DXA bone density spine hip and pelvis    Hemoglobin A1C ([facility])    Ambulatory Referral to Ophthalmology    Hemoglobin A1C    Comprehensive metabolic panel    Lipid Panel with Direct LDL reflex    Microalbumin / creatinine urine ratio    CBC and Platelet    TSH, 3rd generation with Free T4 reflex            Subjective:      Patient ID: Denisse Davila is a 77 y o  female  Patient doing well overall here for follow-up visit and review of lab work      The following portions of the patient's history were reviewed and updated as appropriate: allergies, current medications, past family history, past medical history, past social history, past surgical history and problem list     Review of Systems   Constitutional: Negative for chills, fatigue and fever  HENT: Negative for congestion, nosebleeds, rhinorrhea, sinus pressure and sore throat  Eyes: Negative for discharge and redness  Respiratory: Negative for cough and shortness of breath  Cardiovascular: Negative for chest pain, palpitations and leg swelling  Gastrointestinal: Negative for abdominal pain, blood in stool and nausea  Endocrine: Negative for cold intolerance, heat intolerance and polyuria  Genitourinary: Negative for dysuria and frequency  Musculoskeletal: Negative for arthralgias, back pain and myalgias  Skin: Negative for rash  Neurological: Negative for dizziness, weakness and headaches  Hematological: Negative for adenopathy  Psychiatric/Behavioral: Negative for behavioral problems and sleep disturbance  The patient is not nervous/anxious  Objective:      /70 (BP Location: Left arm, Patient Position: Sitting)   Pulse 84   Temp 97 8 °F (36 6 °C)   Resp 18   Ht 5' 1" (1 549 m)   Wt 75 2 kg (165 lb 12 8 oz)   SpO2 97%   BMI 31 33 kg/m²        Physical Exam  Vitals and nursing note reviewed  Constitutional:       General: She is not in acute distress  Appearance: Normal appearance  She is well-developed and normal weight  HENT:      Head: Normocephalic and atraumatic  Right Ear: Tympanic membrane, ear canal and external ear normal       Left Ear: Tympanic membrane, ear canal and external ear normal       Nose: Nose normal       Mouth/Throat:      Mouth: Mucous membranes are moist       Pharynx: Oropharynx is clear  No oropharyngeal exudate  Eyes:      General: No scleral icterus  Right eye: No discharge  Left eye: No discharge  Extraocular Movements: Extraocular movements intact  Conjunctiva/sclera: Conjunctivae normal       Pupils: Pupils are equal, round, and reactive to light  Neck:      Thyroid: No thyromegaly  Vascular: No JVD  Cardiovascular:      Rate and Rhythm: Normal rate and regular rhythm  Pulses: Normal pulses  Heart sounds: Normal heart sounds  No murmur heard  Pulmonary:      Effort: Pulmonary effort is normal       Breath sounds: No wheezing or rales  Chest:      Chest wall: No tenderness  Abdominal:      General: Bowel sounds are normal  There is no distension  Palpations: Abdomen is soft  There is no mass  Tenderness: There is no abdominal tenderness  Musculoskeletal:         General: No tenderness or deformity  Normal range of motion  Cervical back: Normal range of motion  Lymphadenopathy:      Cervical: No cervical adenopathy  Skin:     General: Skin is warm and dry  Findings: No rash  Neurological:      General: No focal deficit present  Mental Status: She is alert and oriented to person, place, and time  Mental status is at baseline  Cranial Nerves: No cranial nerve deficit  Coordination: Coordination normal       Deep Tendon Reflexes: Reflexes are normal and symmetric  Reflexes normal    Psychiatric:         Mood and Affect: Mood normal          Behavior: Behavior normal          Thought Content: Thought content normal          Judgment: Judgment normal           Data:    Laboratory Results: I have personally reviewed the pertinent laboratory results/reports   Radiology/Other Diagnostic Testing Results: I have personally reviewed pertinent reports         Lab Results   Component Value Date    WBC 10 78 (H) 03/01/2022    HGB 16 4 (H) 03/01/2022    HCT 47 6 (H) 03/01/2022    MCV 91 03/01/2022     03/01/2022     Lab Results   Component Value Date     05/21/2018    K 3 2 (L) 03/01/2022    CL 92 (L) 03/01/2022    CO2 31 03/01/2022    ANIONGAP 14 0 05/21/2018    BUN 9 03/01/2022    CREATININE 0 57 (L) 03/01/2022    GLUF 135 (H) 03/01/2022    CALCIUM 10 1 03/01/2022    AST 14 03/01/2022    ALT 16 03/01/2022    ALKPHOS 56 03/01/2022    PROT 6 4 05/21/2018    BILITOT 0 4 05/21/2018    EGFR 90 03/01/2022     Lab Results   Component Value Date    CHOLESTEROL 179 03/01/2022    CHOLESTEROL 179 09/28/2021    CHOLESTEROL 178 06/24/2021     Lab Results   Component Value Date    HDL 47 (L) 03/01/2022    HDL 40 09/28/2021    HDL 47 06/24/2021     Lab Results   Component Value Date    LDLCALC 83 03/01/2022    LDLCALC 95 09/28/2021    LDLCALC 91 06/24/2021     Lab Results   Component Value Date    TRIG 247 (H) 03/01/2022    TRIG 220 (H) 09/28/2021    TRIG 199 (H) 06/24/2021     No results found for: [place], [place]  Lab Results   Component Value Date    [id number] 4 280 (H) 03/01/2022     Lab Results   Component Value Date    HGBA1C 6 1 (H) 09/28/2021     No results found for: PSA    Luciana Eduar DO

## 2022-03-02 NOTE — ASSESSMENT & PLAN NOTE
Lab Results   Component Value Date    HGBA1C 6 1 (H) 09/28/2021   Patient is doing well overall with A1c at 6 1 now continuing with metformin monitoring diet avoiding excess carbohydrates and concentrated sweets her weight is down 5 pound since October    I would like to encourage her to continue with weight reduction diet and exercise and follow up at her next visit here in 3 months

## 2022-04-07 DIAGNOSIS — I10 ESSENTIAL HYPERTENSION: ICD-10-CM

## 2022-04-07 RX ORDER — POTASSIUM CHLORIDE 750 MG/1
TABLET, FILM COATED, EXTENDED RELEASE ORAL
Qty: 180 TABLET | Refills: 1 | Status: SHIPPED | OUTPATIENT
Start: 2022-04-07

## 2022-05-06 DIAGNOSIS — F41.9 ANXIETY AND DEPRESSION: ICD-10-CM

## 2022-05-06 DIAGNOSIS — F32.A ANXIETY AND DEPRESSION: ICD-10-CM

## 2022-05-06 DIAGNOSIS — E11.42 TYPE 2 DIABETES MELLITUS WITH DIABETIC POLYNEUROPATHY, WITHOUT LONG-TERM CURRENT USE OF INSULIN (HCC): ICD-10-CM

## 2022-05-06 RX ORDER — BUPROPION HYDROCHLORIDE 300 MG/1
TABLET ORAL
Qty: 90 TABLET | Refills: 1 | Status: SHIPPED | OUTPATIENT
Start: 2022-05-06

## 2022-05-06 RX ORDER — PREGABALIN 200 MG/1
CAPSULE ORAL
Qty: 90 CAPSULE | Refills: 0 | Status: SHIPPED | OUTPATIENT
Start: 2022-05-06

## 2022-05-19 DIAGNOSIS — F41.9 ANXIETY AND DEPRESSION: ICD-10-CM

## 2022-05-19 DIAGNOSIS — F32.A ANXIETY AND DEPRESSION: ICD-10-CM

## 2022-05-20 RX ORDER — CLONAZEPAM 1 MG/1
TABLET ORAL
Qty: 30 TABLET | Refills: 3 | Status: SHIPPED | OUTPATIENT
Start: 2022-05-20

## 2022-06-02 ENCOUNTER — RA CDI HCC (OUTPATIENT)
Dept: OTHER | Facility: HOSPITAL | Age: 77
End: 2022-06-02

## 2022-06-02 NOTE — PROGRESS NOTES
[facility]  coding opportunities       Chart reviewed, no opportunity found:   [address]        Patients Insurance     Medicare Insurance: [employer]

## 2022-06-06 ENCOUNTER — APPOINTMENT (OUTPATIENT)
Dept: LAB | Facility: CLINIC | Age: 77
End: 2022-06-06
Payer: COMMERCIAL

## 2022-06-06 DIAGNOSIS — I10 ESSENTIAL HYPERTENSION: ICD-10-CM

## 2022-06-06 DIAGNOSIS — K21.9 GERD WITHOUT ESOPHAGITIS: ICD-10-CM

## 2022-06-06 DIAGNOSIS — E11.42 TYPE 2 DIABETES MELLITUS WITH DIABETIC POLYNEUROPATHY, WITHOUT LONG-TERM CURRENT USE OF INSULIN (HCC): ICD-10-CM

## 2022-06-06 DIAGNOSIS — Z12.12 SCREENING FOR COLORECTAL CANCER: ICD-10-CM

## 2022-06-06 DIAGNOSIS — Z12.11 SCREENING FOR COLORECTAL CANCER: ICD-10-CM

## 2022-06-06 DIAGNOSIS — F41.9 ANXIETY AND DEPRESSION: ICD-10-CM

## 2022-06-06 DIAGNOSIS — F32.A ANXIETY AND DEPRESSION: ICD-10-CM

## 2022-06-06 LAB
ALBUMIN SERPL BCP-MCNC: 3.6 G/DL (ref 3.5–5)
ALP SERPL-CCNC: 60 U/L (ref 46–116)
ALT SERPL W P-5'-P-CCNC: 21 U/L (ref 12–78)
ANION GAP SERPL CALCULATED.3IONS-SCNC: 10 MMOL/L (ref 4–13)
AST SERPL W P-5'-P-CCNC: 15 U/L (ref 5–45)
BILIRUB SERPL-MCNC: 0.44 MG/DL (ref 0.2–1)
BUN SERPL-MCNC: 6 MG/DL (ref 5–25)
CALCIUM SERPL-MCNC: 10.5 MG/DL (ref 8.3–10.1)
CHLORIDE SERPL-SCNC: 93 MMOL/L (ref 100–108)
CHOLEST SERPL-MCNC: 159 MG/DL
CO2 SERPL-SCNC: 30 MMOL/L (ref 21–32)
CREAT SERPL-MCNC: 0.57 MG/DL (ref 0.6–1.3)
CREAT UR-MCNC: 73.8 MG/DL
ERYTHROCYTE [DISTWIDTH] IN BLOOD BY AUTOMATED COUNT: 12.9 % (ref 11.6–15.1)
EST. AVERAGE GLUCOSE BLD GHB EST-MCNC: 128 MG/DL
GFR SERPL CREATININE-BSD FRML MDRD: 90 ML/MIN/1.73SQ M
GLUCOSE P FAST SERPL-MCNC: 145 MG/DL (ref 65–99)
HBA1C MFR BLD: 6.1 %
HCT VFR BLD AUTO: 47.3 % (ref 34.8–46.1)
HDLC SERPL-MCNC: 36 MG/DL
HGB BLD-MCNC: 15.7 G/DL (ref 11.5–15.4)
LDLC SERPL CALC-MCNC: 83 MG/DL (ref 0–100)
MCH RBC QN AUTO: 31 PG (ref 26.8–34.3)
MCHC RBC AUTO-ENTMCNC: 33.2 G/DL (ref 31.4–37.4)
MCV RBC AUTO: 94 FL (ref 82–98)
MICROALBUMIN UR-MCNC: 52.1 MG/L (ref 0–20)
MICROALBUMIN/CREAT 24H UR: 71 MG/G CREATININE (ref 0–30)
PLATELET # BLD AUTO: 332 THOUSANDS/UL (ref 149–390)
PMV BLD AUTO: 9.6 FL (ref 8.9–12.7)
POTASSIUM SERPL-SCNC: 3.5 MMOL/L (ref 3.5–5.3)
PROT SERPL-MCNC: 7 G/DL (ref 6.4–8.2)
RBC # BLD AUTO: 5.06 MILLION/UL (ref 3.81–5.12)
SODIUM SERPL-SCNC: 133 MMOL/L (ref 136–145)
TRIGL SERPL-MCNC: 201 MG/DL
TSH SERPL DL<=0.05 MIU/L-ACNC: 3.36 UIU/ML (ref 0.45–4.5)
WBC # BLD AUTO: 8.1 THOUSAND/UL (ref 4.31–10.16)

## 2022-06-06 PROCEDURE — 36415 COLL VENOUS BLD VENIPUNCTURE: CPT

## 2022-06-06 PROCEDURE — 82570 ASSAY OF URINE CREATININE: CPT

## 2022-06-06 PROCEDURE — 84443 ASSAY THYROID STIM HORMONE: CPT

## 2022-06-06 PROCEDURE — 83036 HEMOGLOBIN GLYCOSYLATED A1C: CPT

## 2022-06-06 PROCEDURE — 85027 COMPLETE CBC AUTOMATED: CPT

## 2022-06-06 PROCEDURE — 80061 LIPID PANEL: CPT

## 2022-06-06 PROCEDURE — 3060F POS MICROALBUMINURIA REV: CPT | Performed by: FAMILY MEDICINE

## 2022-06-06 PROCEDURE — 82043 UR ALBUMIN QUANTITATIVE: CPT

## 2022-06-06 PROCEDURE — 80053 COMPREHEN METABOLIC PANEL: CPT

## 2022-06-06 PROCEDURE — 3044F HG A1C LEVEL LT 7.0%: CPT | Performed by: FAMILY MEDICINE

## 2022-06-14 ENCOUNTER — OFFICE VISIT (OUTPATIENT)
Dept: FAMILY MEDICINE CLINIC | Facility: CLINIC | Age: 77
End: 2022-06-14
Payer: COMMERCIAL

## 2022-06-14 VITALS
RESPIRATION RATE: 18 BRPM | TEMPERATURE: 97.5 F | OXYGEN SATURATION: 96 % | DIASTOLIC BLOOD PRESSURE: 70 MMHG | HEIGHT: 61 IN | SYSTOLIC BLOOD PRESSURE: 130 MMHG | HEART RATE: 73 BPM | WEIGHT: 162.8 LBS | BODY MASS INDEX: 30.73 KG/M2

## 2022-06-14 DIAGNOSIS — I10 ESSENTIAL HYPERTENSION: ICD-10-CM

## 2022-06-14 DIAGNOSIS — F41.9 ANXIETY AND DEPRESSION: ICD-10-CM

## 2022-06-14 DIAGNOSIS — K21.9 GERD WITHOUT ESOPHAGITIS: Primary | ICD-10-CM

## 2022-06-14 DIAGNOSIS — F32.A ANXIETY AND DEPRESSION: ICD-10-CM

## 2022-06-14 DIAGNOSIS — E11.42 TYPE 2 DIABETES MELLITUS WITH DIABETIC POLYNEUROPATHY, WITHOUT LONG-TERM CURRENT USE OF INSULIN (HCC): ICD-10-CM

## 2022-06-14 PROCEDURE — 3075F SYST BP GE 130 - 139MM HG: CPT | Performed by: FAMILY MEDICINE

## 2022-06-14 PROCEDURE — 1160F RVW MEDS BY RX/DR IN RCRD: CPT | Performed by: FAMILY MEDICINE

## 2022-06-14 PROCEDURE — 3078F DIAST BP <80 MM HG: CPT | Performed by: FAMILY MEDICINE

## 2022-06-14 PROCEDURE — 99214 OFFICE O/P EST MOD 30 MIN: CPT | Performed by: FAMILY MEDICINE

## 2022-06-14 NOTE — ASSESSMENT & PLAN NOTE
Lab Results   Component Value Date    HGBA1C 6 1 (H) 06/06/2022   A1c at 6 1 patient has been under stress but doing relatively well with her diabetes at times her blood sugars elevated on average over 120 in the morning but last done at was at 82 she checks at different times in the day she will continue her same regimen now as her A1c is stable and I will follow up with her in 4 months

## 2022-06-14 NOTE — PROGRESS NOTES
Assessment/Plan:       Problem List Items Addressed This Visit        Digestive    GERD without esophagitis - Primary     GERD symptoms stable patient doing well overall will continue with pantoprazole              Endocrine    Type 2 diabetes mellitus with diabetic polyneuropathy ([facility])       Lab Results   Component Value Date    HGBA1C 6 1 (H) 06/06/2022   A1c at 6 1 patient has been under stress but doing relatively well with her diabetes at times her blood sugars elevated on average over 120 in the morning but last done at was at 81 she checks at different times in the day she will continue her same regimen now as her A1c is stable and I will follow up with her in 4 months              Cardiovascular and Mediastinum    Essential hypertension     Hypertension under stable control continue on current medication hydrochlorothiazide              Other    Anxiety and depression     Stable continue same medications patient is under more stress over [place] with kidney function and dialysis issues                   Subjective:      Patient ID: Gissell Wood is a 77 y o  female  Patient presents for 4 month follow-up evaluation and review of lab work    Diabetes  Hypoglycemia symptoms include nervousness/anxiousness  Pertinent negatives for hypoglycemia include no dizziness or headaches  Pertinent negatives for diabetes include no chest pain, no fatigue, no polyuria and no weakness  The following portions of the patient's history were reviewed and updated as appropriate: allergies, current medications, past family history, past medical history, past social history, past surgical history and problem list     Review of Systems   Constitutional: Negative for chills, fatigue and fever  HENT: Negative for congestion, nosebleeds, rhinorrhea, sinus pressure and sore throat  Eyes: Negative for discharge and redness  Respiratory: Negative for cough and shortness of breath      Cardiovascular: Negative for chest pain, palpitations and leg swelling  Gastrointestinal: Negative for abdominal pain, blood in stool and nausea  Endocrine: Negative for cold intolerance, heat intolerance and polyuria  Genitourinary: Negative for dysuria and frequency  Musculoskeletal: Positive for arthralgias and back pain  Negative for myalgias  Skin: Negative for rash  Neurological: Negative for dizziness, weakness and headaches  Hematological: Negative for adenopathy  Psychiatric/Behavioral: Negative for behavioral problems and sleep disturbance  The patient is nervous/anxious  Objective:      /70   Pulse 73   Temp 97 5 °F (36 4 °C)   Resp 18   Ht 5' 1" (1 549 m)   Wt 73 8 kg (162 lb 12 8 oz)   SpO2 96%   BMI 30 76 kg/m²        Physical Exam  Vitals and nursing note reviewed  Constitutional:       General: She is not in acute distress  Appearance: Normal appearance  She is well-developed and normal weight  HENT:      Head: Normocephalic and atraumatic  Right Ear: Tympanic membrane and external ear normal       Left Ear: Tympanic membrane and external ear normal       Nose: Nose normal       Mouth/Throat:      Mouth: Mucous membranes are moist       Pharynx: Oropharynx is clear  No oropharyngeal exudate  Eyes:      General: No scleral icterus  Right eye: No discharge  Left eye: No discharge  Extraocular Movements: Extraocular movements intact  Conjunctiva/sclera: Conjunctivae normal       Pupils: Pupils are equal, round, and reactive to light  Neck:      Thyroid: No thyromegaly  Vascular: No JVD  Cardiovascular:      Rate and Rhythm: Normal rate and regular rhythm  Pulses: Normal pulses  Heart sounds: Normal heart sounds  No murmur heard  Pulmonary:      Effort: Pulmonary effort is normal       Breath sounds: No wheezing or rales  Chest:      Chest wall: No tenderness  Abdominal:      General: Bowel sounds are normal  There is no distension  Palpations: Abdomen is soft  There is no mass  Tenderness: There is no abdominal tenderness  Musculoskeletal:         General: No tenderness or deformity  Normal range of motion  Cervical back: Normal range of motion  Lymphadenopathy:      Cervical: No cervical adenopathy  Skin:     General: Skin is warm and dry  Capillary Refill: Capillary refill takes less than 2 seconds  Findings: No rash  Neurological:      General: No focal deficit present  Mental Status: She is alert and oriented to person, place, and time  Mental status is at baseline  Cranial Nerves: No cranial nerve deficit  Coordination: Coordination normal       Deep Tendon Reflexes: Reflexes are normal and symmetric  Reflexes normal    Psychiatric:         Mood and Affect: Mood normal          Behavior: Behavior normal          Thought Content: Thought content normal          Judgment: Judgment normal           Data:    Laboratory Results: I have personally reviewed the pertinent laboratory results/reports   Radiology/Other Diagnostic Testing Results: I have personally reviewed pertinent reports          Lab Results   Component Value Date    WBC 8 10 06/06/2022    HGB 15 7 (H) 06/06/2022    HCT 47 3 (H) 06/06/2022    MCV 94 06/06/2022     06/06/2022     Lab Results   Component Value Date     05/21/2018    K 3 5 06/06/2022    CL 93 (L) 06/06/2022    CO2 30 06/06/2022    ANIONGAP 14 0 05/21/2018    BUN 6 06/06/2022    CREATININE 0 57 (L) 06/06/2022    GLUF 145 (H) 06/06/2022    CALCIUM 10 5 (H) 06/06/2022    AST 15 06/06/2022    ALT 21 06/06/2022    ALKPHOS 60 06/06/2022    PROT 6 4 05/21/2018    BILITOT 0 4 05/21/2018    EGFR 90 06/06/2022     Lab Results   Component Value Date    CHOLESTEROL 159 06/06/2022    CHOLESTEROL 179 03/01/2022    CHOLESTEROL 179 09/28/2021     Lab Results   Component Value Date    HDL 36 (L) 06/06/2022    HDL 47 (L) 03/01/2022    HDL 40 09/28/2021     Lab Results   Component Value Date    LDLCALC 83 06/06/2022    LDLCALC 83 03/01/2022    LDLCALC 95 09/28/2021     Lab Results   Component Value Date    TRIG 201 (H) 06/06/2022    TRIG 247 (H) 03/01/2022    TRIG 220 (H) 09/28/2021     No results found for: [place], [place]  Lab Results   Component Value Date    [id number] 3 360 06/06/2022     Lab Results   Component Value Date    HGBA1C 6 1 (H) 06/06/2022     No results found for: DOE Tellez DO

## 2022-06-14 NOTE — PROGRESS NOTES
BMI Counseling: Body mass index is 30 76 kg/m²  The BMI is above normal  Nutrition recommendations include reducing portion sizes, decreasing overall calorie intake, reducing fast food intake, consuming healthier snacks, decreasing soda and/or juice intake, moderation in carbohydrate intake, increasing intake of lean protein, reducing intake of saturated fat and trans fat and reducing intake of cholesterol  Exercise recommendations include exercising 3-5 times per week

## 2022-06-14 NOTE — ASSESSMENT & PLAN NOTE
Stable continue same medications patient is under more stress over [place] with kidney function and dialysis issues

## 2022-06-30 LAB
LEFT EYE DIABETIC RETINOPATHY: NORMAL
RIGHT EYE DIABETIC RETINOPATHY: NORMAL

## 2022-08-15 DIAGNOSIS — I10 ESSENTIAL HYPERTENSION: ICD-10-CM

## 2022-08-15 RX ORDER — HYDROCHLOROTHIAZIDE 50 MG/1
TABLET ORAL
Qty: 90 TABLET | Refills: 3 | Status: SHIPPED | OUTPATIENT
Start: 2022-08-15

## 2022-09-15 ENCOUNTER — APPOINTMENT (OUTPATIENT)
Dept: LAB | Facility: CLINIC | Age: 77
End: 2022-09-15
Payer: COMMERCIAL

## 2022-09-15 DIAGNOSIS — F32.A ANXIETY AND DEPRESSION: ICD-10-CM

## 2022-09-15 DIAGNOSIS — E11.42 TYPE 2 DIABETES MELLITUS WITH DIABETIC POLYNEUROPATHY, WITHOUT LONG-TERM CURRENT USE OF INSULIN (HCC): ICD-10-CM

## 2022-09-15 DIAGNOSIS — K21.9 GERD WITHOUT ESOPHAGITIS: ICD-10-CM

## 2022-09-15 DIAGNOSIS — F41.9 ANXIETY AND DEPRESSION: ICD-10-CM

## 2022-09-15 DIAGNOSIS — I10 ESSENTIAL HYPERTENSION: ICD-10-CM

## 2022-09-15 LAB
ALBUMIN SERPL BCP-MCNC: 3.6 G/DL (ref 3.5–5)
ALP SERPL-CCNC: 69 U/L (ref 46–116)
ALT SERPL W P-5'-P-CCNC: 20 U/L (ref 12–78)
ANION GAP SERPL CALCULATED.3IONS-SCNC: 5 MMOL/L (ref 4–13)
AST SERPL W P-5'-P-CCNC: 15 U/L (ref 5–45)
BASOPHILS # BLD AUTO: 0.06 THOUSANDS/ΜL (ref 0–0.1)
BASOPHILS NFR BLD AUTO: 1 % (ref 0–1)
BILIRUB SERPL-MCNC: 0.36 MG/DL (ref 0.2–1)
BUN SERPL-MCNC: 8 MG/DL (ref 5–25)
CALCIUM SERPL-MCNC: 9.8 MG/DL (ref 8.3–10.1)
CHLORIDE SERPL-SCNC: 97 MMOL/L (ref 96–108)
CHOLEST SERPL-MCNC: 144 MG/DL
CO2 SERPL-SCNC: 31 MMOL/L (ref 21–32)
CREAT SERPL-MCNC: 0.64 MG/DL (ref 0.6–1.3)
CREAT UR-MCNC: 89.8 MG/DL
EOSINOPHIL # BLD AUTO: 0.33 THOUSAND/ΜL (ref 0–0.61)
EOSINOPHIL NFR BLD AUTO: 4 % (ref 0–6)
ERYTHROCYTE [DISTWIDTH] IN BLOOD BY AUTOMATED COUNT: 13.5 % (ref 11.6–15.1)
GFR SERPL CREATININE-BSD FRML MDRD: 86 ML/MIN/1.73SQ M
GLUCOSE P FAST SERPL-MCNC: 142 MG/DL (ref 65–99)
HCT VFR BLD AUTO: 49 % (ref 34.8–46.1)
HDLC SERPL-MCNC: 40 MG/DL
HGB BLD-MCNC: 16.1 G/DL (ref 11.5–15.4)
IMM GRANULOCYTES # BLD AUTO: 0.03 THOUSAND/UL (ref 0–0.2)
IMM GRANULOCYTES NFR BLD AUTO: 0 % (ref 0–2)
LDLC SERPL CALC-MCNC: 71 MG/DL (ref 0–100)
LYMPHOCYTES # BLD AUTO: 2.76 THOUSANDS/ΜL (ref 0.6–4.47)
LYMPHOCYTES NFR BLD AUTO: 30 % (ref 14–44)
MCH RBC QN AUTO: 31.2 PG (ref 26.8–34.3)
MCHC RBC AUTO-ENTMCNC: 32.9 G/DL (ref 31.4–37.4)
MCV RBC AUTO: 95 FL (ref 82–98)
MICROALBUMIN UR-MCNC: 15.9 MG/L (ref 0–20)
MICROALBUMIN/CREAT 24H UR: 18 MG/G CREATININE (ref 0–30)
MONOCYTES # BLD AUTO: 0.49 THOUSAND/ΜL (ref 0.17–1.22)
MONOCYTES NFR BLD AUTO: 5 % (ref 4–12)
NEUTROPHILS # BLD AUTO: 5.6 THOUSANDS/ΜL (ref 1.85–7.62)
NEUTS SEG NFR BLD AUTO: 60 % (ref 43–75)
NONHDLC SERPL-MCNC: 104 MG/DL
NRBC BLD AUTO-RTO: 0 /100 WBCS
PLATELET # BLD AUTO: 310 THOUSANDS/UL (ref 149–390)
PMV BLD AUTO: 10.1 FL (ref 8.9–12.7)
POTASSIUM SERPL-SCNC: 3.6 MMOL/L (ref 3.5–5.3)
PROT SERPL-MCNC: 7.5 G/DL (ref 6.4–8.4)
RBC # BLD AUTO: 5.16 MILLION/UL (ref 3.81–5.12)
SODIUM SERPL-SCNC: 133 MMOL/L (ref 135–147)
TRIGL SERPL-MCNC: 163 MG/DL
TSH SERPL DL<=0.05 MIU/L-ACNC: 2.33 UIU/ML (ref 0.45–4.5)
WBC # BLD AUTO: 9.27 THOUSAND/UL (ref 4.31–10.16)

## 2022-09-15 PROCEDURE — 84443 ASSAY THYROID STIM HORMONE: CPT

## 2022-09-15 PROCEDURE — 82570 ASSAY OF URINE CREATININE: CPT

## 2022-09-15 PROCEDURE — 85025 COMPLETE CBC W/AUTO DIFF WBC: CPT

## 2022-09-15 PROCEDURE — 83036 HEMOGLOBIN GLYCOSYLATED A1C: CPT

## 2022-09-15 PROCEDURE — 80053 COMPREHEN METABOLIC PANEL: CPT

## 2022-09-15 PROCEDURE — 36415 COLL VENOUS BLD VENIPUNCTURE: CPT

## 2022-09-15 PROCEDURE — 82043 UR ALBUMIN QUANTITATIVE: CPT

## 2022-09-15 PROCEDURE — 80061 LIPID PANEL: CPT

## 2022-09-16 DIAGNOSIS — E11.42 TYPE 2 DIABETES MELLITUS WITH DIABETIC POLYNEUROPATHY, WITHOUT LONG-TERM CURRENT USE OF INSULIN (HCC): ICD-10-CM

## 2022-09-16 LAB
EST. AVERAGE GLUCOSE BLD GHB EST-MCNC: 128 MG/DL
HBA1C MFR BLD: 6.1 %

## 2022-09-19 RX ORDER — PREGABALIN 200 MG/1
200 CAPSULE ORAL 3 TIMES DAILY
Qty: 90 CAPSULE | Refills: 0 | Status: SHIPPED | OUTPATIENT
Start: 2022-09-19 | End: 2022-10-27 | Stop reason: SDUPTHER

## 2022-10-03 DIAGNOSIS — Z12.31 SCREENING MAMMOGRAM, ENCOUNTER FOR: Primary | ICD-10-CM

## 2022-10-09 DIAGNOSIS — Z12.31 SCREENING MAMMOGRAM, ENCOUNTER FOR: ICD-10-CM

## 2022-10-17 ENCOUNTER — RA CDI HCC (OUTPATIENT)
Dept: OTHER | Facility: HOSPITAL | Age: 77
End: 2022-10-17

## 2022-10-17 NOTE — PROGRESS NOTES
[facility]  coding opportunities       Chart reviewed, no opportunity found:   [address]        Patients Insurance     Medicare Insurance: [employer]

## 2022-10-18 ENCOUNTER — OFFICE VISIT (OUTPATIENT)
Dept: FAMILY MEDICINE CLINIC | Facility: CLINIC | Age: 77
End: 2022-10-18
Payer: COMMERCIAL

## 2022-10-18 VITALS
RESPIRATION RATE: 18 BRPM | WEIGHT: 164.8 LBS | BODY MASS INDEX: 31.11 KG/M2 | TEMPERATURE: 97.6 F | OXYGEN SATURATION: 94 % | SYSTOLIC BLOOD PRESSURE: 130 MMHG | DIASTOLIC BLOOD PRESSURE: 70 MMHG | HEIGHT: 61 IN | HEART RATE: 81 BPM

## 2022-10-18 DIAGNOSIS — N39.46 MIXED STRESS AND URGE URINARY INCONTINENCE: ICD-10-CM

## 2022-10-18 DIAGNOSIS — E11.9 DIABETES MELLITUS WITHOUT COMPLICATION (HCC): ICD-10-CM

## 2022-10-18 DIAGNOSIS — E11.42 TYPE 2 DIABETES MELLITUS WITH DIABETIC POLYNEUROPATHY, WITHOUT LONG-TERM CURRENT USE OF INSULIN (HCC): ICD-10-CM

## 2022-10-18 DIAGNOSIS — Z23 ENCOUNTER FOR IMMUNIZATION: Primary | ICD-10-CM

## 2022-10-18 DIAGNOSIS — F32.A ANXIETY AND DEPRESSION: ICD-10-CM

## 2022-10-18 DIAGNOSIS — E11.65 UNCONTROLLED TYPE 2 DIABETES MELLITUS WITH HYPERGLYCEMIA (HCC): ICD-10-CM

## 2022-10-18 DIAGNOSIS — K21.9 GERD WITHOUT ESOPHAGITIS: ICD-10-CM

## 2022-10-18 DIAGNOSIS — F41.9 ANXIETY AND DEPRESSION: ICD-10-CM

## 2022-10-18 DIAGNOSIS — I10 ESSENTIAL HYPERTENSION: ICD-10-CM

## 2022-10-18 PROCEDURE — 90662 IIV NO PRSV INCREASED AG IM: CPT | Performed by: FAMILY MEDICINE

## 2022-10-18 PROCEDURE — 99214 OFFICE O/P EST MOD 30 MIN: CPT | Performed by: FAMILY MEDICINE

## 2022-10-18 PROCEDURE — G0008 ADMIN INFLUENZA VIRUS VAC: HCPCS | Performed by: FAMILY MEDICINE

## 2022-10-18 RX ORDER — CLONAZEPAM 1 MG/1
1 TABLET ORAL EVERY EVENING
Qty: 30 TABLET | Refills: 3 | Status: SHIPPED | OUTPATIENT
Start: 2022-10-18

## 2022-10-18 RX ORDER — METFORMIN HYDROCHLORIDE 500 MG/1
1000 TABLET, EXTENDED RELEASE ORAL 2 TIMES DAILY
Qty: 360 TABLET | Refills: 0 | Status: SHIPPED | OUTPATIENT
Start: 2022-10-18

## 2022-10-18 RX ORDER — POTASSIUM CHLORIDE 750 MG/1
TABLET, FILM COATED, EXTENDED RELEASE ORAL
Qty: 180 TABLET | Refills: 1 | Status: SHIPPED | OUTPATIENT
Start: 2022-10-18

## 2022-10-18 RX ORDER — GABAPENTIN 600 MG/1
600 TABLET ORAL EVERY EVENING
Qty: 90 TABLET | Refills: 0 | Status: SHIPPED | OUTPATIENT
Start: 2022-10-18

## 2022-10-18 RX ORDER — SOLIFENACIN SUCCINATE 5 MG/1
5 TABLET, FILM COATED ORAL DAILY
Qty: 90 TABLET | Refills: 0 | Status: SHIPPED | OUTPATIENT
Start: 2022-10-18

## 2022-10-18 NOTE — PROGRESS NOTES
Assessment/Plan:       Problem List Items Addressed This Visit        Digestive    GERD without esophagitis     Current symptoms are stable continue with Dexilant            Endocrine    Type 2 diabetes mellitus with diabetic polyneuropathy ([facility] )       Lab Results   Component Value Date    HGBA1C 6 1 (H) 09/15/2022   A1c stable at 6 1 continue with current medication regimen and diet follow-up in 4 months            Cardiovascular and Mediastinum    Essential hypertension     Hypertension under stable control continue with current medication regimen and monitor salt intake            Other    Anxiety and depression     Longstanding generalized anxiety continuing with clonazepam recommend reduction in dosage and use p r n  Other Visit Diagnoses     Encounter for immunization    -  Primary    Uncontrolled type 2 diabetes mellitus with hyperglycemia ([facility] )        Diabetes mellitus without complication ([place] [id number] )        Mixed stress and urge urinary incontinence                Subjective:      Patient ID: Amanda Silver is a 69 y o  female  4 month follow-up evaluation and review of lab work      The following portions of the patient's history were reviewed and updated as appropriate: allergies, current medications, past family history, past medical history, past social history, past surgical history and problem list     Review of Systems   Constitutional: Negative for chills, fatigue and fever  HENT: Negative for congestion, nosebleeds, rhinorrhea, sinus pressure and sore throat  Eyes: Negative for discharge and redness  Respiratory: Negative for cough and shortness of breath  Cardiovascular: Negative for chest pain, palpitations and leg swelling  Gastrointestinal: Negative for abdominal pain, blood in stool and nausea  Endocrine: Negative for cold intolerance, heat intolerance and polyuria  Genitourinary: Negative for dysuria and frequency     Musculoskeletal: Negative for arthralgias, back pain and myalgias  Skin: Negative for rash  Neurological: Negative for dizziness, weakness and headaches  Hematological: Negative for adenopathy  Psychiatric/Behavioral: Negative for behavioral problems and sleep disturbance  The patient is not nervous/anxious  Objective:      /70 (BP Location: Left arm, Patient Position: Sitting)   Pulse 81   Temp 97 6 °F (36 4 °C)   Resp 18   Ht 5' 1" (1 549 m)   Wt 74 8 kg (164 lb 12 8 oz)   SpO2 94%   BMI 31 14 kg/m²        Physical Exam  Vitals and nursing note reviewed  Constitutional:       General: She is not in acute distress  Appearance: Normal appearance  She is well-developed  HENT:      Head: Normocephalic and atraumatic  Right Ear: Tympanic membrane, ear canal and external ear normal       Left Ear: Tympanic membrane, ear canal and external ear normal       Nose: Nose normal       Mouth/Throat:      Mouth: Mucous membranes are moist       Pharynx: Oropharynx is clear  No oropharyngeal exudate  Eyes:      General: No scleral icterus  Right eye: No discharge  Left eye: No discharge  Conjunctiva/sclera: Conjunctivae normal       Pupils: Pupils are equal, round, and reactive to light  Neck:      Thyroid: No thyromegaly  Vascular: No JVD  Cardiovascular:      Rate and Rhythm: Normal rate and regular rhythm  Heart sounds: Normal heart sounds  No murmur heard  Pulmonary:      Effort: Pulmonary effort is normal       Breath sounds: Normal breath sounds  No wheezing or rales  Chest:      Chest wall: No tenderness  Abdominal:      General: Bowel sounds are normal  There is no distension  Palpations: Abdomen is soft  There is no mass  Tenderness: There is no abdominal tenderness  Musculoskeletal:         General: Tenderness present  No deformity  Normal range of motion  Cervical back: Normal range of motion  Lymphadenopathy:      Cervical: No cervical adenopathy     Skin:     General: Skin is warm and dry  Findings: Rash present  Neurological:      Mental Status: She is alert and oriented to person, place, and time  Cranial Nerves: No cranial nerve deficit  Coordination: Coordination normal       Deep Tendon Reflexes: Reflexes are normal and symmetric  Reflexes normal    Psychiatric:         Mood and Affect: Mood normal          Behavior: Behavior normal          Thought Content: Thought content normal          Judgment: Judgment normal           Data:    Laboratory Results: I have personally reviewed the pertinent laboratory results/reports   Radiology/Other Diagnostic Testing Results: I have personally reviewed pertinent reports         Lab Results   Component Value Date    WBC 9 27 09/15/2022    HGB 16 1 (H) 09/15/2022    HCT 49 0 (H) 09/15/2022    MCV 95 09/15/2022     09/15/2022     Lab Results   Component Value Date     05/21/2018    K 3 6 09/15/2022    CL 97 09/15/2022    CO2 31 09/15/2022    ANIONGAP 14 0 05/21/2018    BUN 8 09/15/2022    CREATININE 0 64 09/15/2022    GLUF 142 (H) 09/15/2022    CALCIUM 9 8 09/15/2022    AST 15 09/15/2022    ALT 20 09/15/2022    ALKPHOS 69 09/15/2022    PROT 6 4 05/21/2018    BILITOT 0 4 05/21/2018    EGFR 86 09/15/2022     Lab Results   Component Value Date    CHOLESTEROL 144 09/15/2022    CHOLESTEROL 159 06/06/2022    CHOLESTEROL 179 03/01/2022     Lab Results   Component Value Date    HDL 40 (L) 09/15/2022    HDL 36 (L) 06/06/2022    HDL 47 (L) 03/01/2022     Lab Results   Component Value Date    LDLCALC 71 09/15/2022    LDLCALC 83 06/06/2022    LDLCALC 83 03/01/2022     Lab Results   Component Value Date    TRIG 163 (H) 09/15/2022    TRIG 201 (H) 06/06/2022    TRIG 247 (H) 03/01/2022     No results found for: [place], [place]  Lab Results   Component Value Date    [id number] 2 330 09/15/2022     Lab Results   Component Value Date    HGBA1C 6 1 (H) 09/15/2022     No results found for: PSA    [facility]

## 2022-10-18 NOTE — ASSESSMENT & PLAN NOTE
Longstanding generalized anxiety continuing with clonazepam recommend reduction in dosage and use p r n

## 2022-10-18 NOTE — ASSESSMENT & PLAN NOTE
Lab Results   Component Value Date    HGBA1C 6 1 (H) 09/15/2022   A1c stable at 6 1 continue with current medication regimen and diet follow-up in 4 months

## 2022-10-27 DIAGNOSIS — E11.42 TYPE 2 DIABETES MELLITUS WITH DIABETIC POLYNEUROPATHY, WITHOUT LONG-TERM CURRENT USE OF INSULIN (HCC): ICD-10-CM

## 2022-10-27 RX ORDER — PREGABALIN 200 MG/1
200 CAPSULE ORAL 3 TIMES DAILY
Qty: 90 CAPSULE | Refills: 0 | Status: SHIPPED | OUTPATIENT
Start: 2022-10-27 | End: 2022-11-01

## 2022-11-01 DIAGNOSIS — E11.42 TYPE 2 DIABETES MELLITUS WITH DIABETIC POLYNEUROPATHY, WITHOUT LONG-TERM CURRENT USE OF INSULIN (HCC): ICD-10-CM

## 2022-11-01 DIAGNOSIS — F41.9 ANXIETY AND DEPRESSION: ICD-10-CM

## 2022-11-01 DIAGNOSIS — F32.A ANXIETY AND DEPRESSION: ICD-10-CM

## 2022-11-01 RX ORDER — PREGABALIN 200 MG/1
CAPSULE ORAL
Qty: 90 CAPSULE | Refills: 0 | Status: SHIPPED | OUTPATIENT
Start: 2022-11-01

## 2022-11-01 RX ORDER — CLONAZEPAM 1 MG/1
TABLET ORAL
Qty: 30 TABLET | Refills: 3 | Status: SHIPPED | OUTPATIENT
Start: 2022-11-01

## 2022-11-15 DIAGNOSIS — E11.42 TYPE 2 DIABETES MELLITUS WITH DIABETIC POLYNEUROPATHY, WITHOUT LONG-TERM CURRENT USE OF INSULIN (HCC): ICD-10-CM

## 2022-11-15 RX ORDER — BLOOD SUGAR DIAGNOSTIC
STRIP MISCELLANEOUS
Qty: 100 STRIP | Refills: 5 | Status: SHIPPED | OUTPATIENT
Start: 2022-11-15

## 2022-11-15 RX ORDER — LANCETS 33 GAUGE
EACH MISCELLANEOUS
Qty: 100 EACH | Refills: 5 | Status: SHIPPED | OUTPATIENT
Start: 2022-11-15

## 2022-11-28 DIAGNOSIS — F32.A ANXIETY AND DEPRESSION: ICD-10-CM

## 2022-11-28 DIAGNOSIS — F41.9 ANXIETY AND DEPRESSION: ICD-10-CM

## 2022-11-28 RX ORDER — BUPROPION HYDROCHLORIDE 300 MG/1
TABLET ORAL
Qty: 90 TABLET | Refills: 1 | Status: SHIPPED | OUTPATIENT
Start: 2022-11-28

## 2022-12-06 ENCOUNTER — APPOINTMENT (OUTPATIENT)
Dept: LAB | Facility: CLINIC | Age: 77
End: 2022-12-06

## 2022-12-06 DIAGNOSIS — E11.65 UNCONTROLLED TYPE 2 DIABETES MELLITUS WITH HYPERGLYCEMIA (HCC): ICD-10-CM

## 2022-12-06 LAB
ALBUMIN SERPL BCP-MCNC: 4.1 G/DL (ref 3.5–5)
ALP SERPL-CCNC: 58 U/L (ref 46–116)
ALT SERPL W P-5'-P-CCNC: 16 U/L (ref 12–78)
ANION GAP SERPL CALCULATED.3IONS-SCNC: 5 MMOL/L (ref 4–13)
AST SERPL W P-5'-P-CCNC: 14 U/L (ref 5–45)
BASOPHILS # BLD AUTO: 0.07 THOUSANDS/ÂΜL (ref 0–0.1)
BASOPHILS NFR BLD AUTO: 1 % (ref 0–1)
BILIRUB SERPL-MCNC: 0.31 MG/DL (ref 0.2–1)
BUN SERPL-MCNC: 11 MG/DL (ref 5–25)
CALCIUM SERPL-MCNC: 10.3 MG/DL (ref 8.3–10.1)
CHLORIDE SERPL-SCNC: 97 MMOL/L (ref 96–108)
CHOLEST SERPL-MCNC: 172 MG/DL
CO2 SERPL-SCNC: 30 MMOL/L (ref 21–32)
CREAT SERPL-MCNC: 0.55 MG/DL (ref 0.6–1.3)
EOSINOPHIL # BLD AUTO: 0.3 THOUSAND/ÂΜL (ref 0–0.61)
EOSINOPHIL NFR BLD AUTO: 3 % (ref 0–6)
ERYTHROCYTE [DISTWIDTH] IN BLOOD BY AUTOMATED COUNT: 13.2 % (ref 11.6–15.1)
EST. AVERAGE GLUCOSE BLD GHB EST-MCNC: 131 MG/DL
GFR SERPL CREATININE-BSD FRML MDRD: 91 ML/MIN/1.73SQ M
GLUCOSE P FAST SERPL-MCNC: 135 MG/DL (ref 65–99)
HBA1C MFR BLD: 6.2 %
HCT VFR BLD AUTO: 49.2 % (ref 34.8–46.1)
HDLC SERPL-MCNC: 48 MG/DL
HGB BLD-MCNC: 15.9 G/DL (ref 11.5–15.4)
IMM GRANULOCYTES # BLD AUTO: 0.02 THOUSAND/UL (ref 0–0.2)
IMM GRANULOCYTES NFR BLD AUTO: 0 % (ref 0–2)
LDLC SERPL CALC-MCNC: 84 MG/DL (ref 0–100)
LYMPHOCYTES # BLD AUTO: 2.63 THOUSANDS/ÂΜL (ref 0.6–4.47)
LYMPHOCYTES NFR BLD AUTO: 29 % (ref 14–44)
MCH RBC QN AUTO: 30.8 PG (ref 26.8–34.3)
MCHC RBC AUTO-ENTMCNC: 32.3 G/DL (ref 31.4–37.4)
MCV RBC AUTO: 95 FL (ref 82–98)
MONOCYTES # BLD AUTO: 0.46 THOUSAND/ÂΜL (ref 0.17–1.22)
MONOCYTES NFR BLD AUTO: 5 % (ref 4–12)
NEUTROPHILS # BLD AUTO: 5.76 THOUSANDS/ÂΜL (ref 1.85–7.62)
NEUTS SEG NFR BLD AUTO: 62 % (ref 43–75)
NONHDLC SERPL-MCNC: 124 MG/DL
NRBC BLD AUTO-RTO: 0 /100 WBCS
PLATELET # BLD AUTO: 290 THOUSANDS/UL (ref 149–390)
PMV BLD AUTO: 10 FL (ref 8.9–12.7)
POTASSIUM SERPL-SCNC: 3.8 MMOL/L (ref 3.5–5.3)
PROT SERPL-MCNC: 7.1 G/DL (ref 6.4–8.4)
RBC # BLD AUTO: 5.16 MILLION/UL (ref 3.81–5.12)
SODIUM SERPL-SCNC: 132 MMOL/L (ref 135–147)
TRIGL SERPL-MCNC: 201 MG/DL
TSH SERPL DL<=0.05 MIU/L-ACNC: 3.57 UIU/ML (ref 0.45–4.5)
WBC # BLD AUTO: 9.24 THOUSAND/UL (ref 4.31–10.16)

## 2023-01-23 DIAGNOSIS — N39.46 MIXED STRESS AND URGE URINARY INCONTINENCE: ICD-10-CM

## 2023-01-23 DIAGNOSIS — K21.9 GERD WITHOUT ESOPHAGITIS: ICD-10-CM

## 2023-01-23 RX ORDER — SOLIFENACIN SUCCINATE 5 MG/1
5 TABLET, FILM COATED ORAL DAILY
Qty: 90 TABLET | Refills: 0 | Status: SHIPPED | OUTPATIENT
Start: 2023-01-23 | End: 2023-02-09 | Stop reason: SDUPTHER

## 2023-01-23 RX ORDER — PANTOPRAZOLE SODIUM 40 MG/1
40 TABLET, DELAYED RELEASE ORAL DAILY
Qty: 90 TABLET | Refills: 3 | Status: SHIPPED | OUTPATIENT
Start: 2023-01-23

## 2023-02-09 ENCOUNTER — OFFICE VISIT (OUTPATIENT)
Dept: FAMILY MEDICINE CLINIC | Facility: CLINIC | Age: 78
End: 2023-02-09

## 2023-02-09 VITALS
HEART RATE: 81 BPM | BODY MASS INDEX: 31.19 KG/M2 | HEIGHT: 61 IN | TEMPERATURE: 98.2 F | WEIGHT: 165.2 LBS | OXYGEN SATURATION: 95 % | SYSTOLIC BLOOD PRESSURE: 130 MMHG | RESPIRATION RATE: 18 BRPM | DIASTOLIC BLOOD PRESSURE: 78 MMHG

## 2023-02-09 DIAGNOSIS — F41.9 ANXIETY AND DEPRESSION: ICD-10-CM

## 2023-02-09 DIAGNOSIS — I10 ESSENTIAL HYPERTENSION: ICD-10-CM

## 2023-02-09 DIAGNOSIS — N39.46 MIXED STRESS AND URGE URINARY INCONTINENCE: ICD-10-CM

## 2023-02-09 DIAGNOSIS — F32.A ANXIETY AND DEPRESSION: ICD-10-CM

## 2023-02-09 DIAGNOSIS — K21.9 GERD WITHOUT ESOPHAGITIS: ICD-10-CM

## 2023-02-09 DIAGNOSIS — E11.42 TYPE 2 DIABETES MELLITUS WITH DIABETIC POLYNEUROPATHY, WITHOUT LONG-TERM CURRENT USE OF INSULIN (HCC): ICD-10-CM

## 2023-02-09 DIAGNOSIS — E11.65 UNCONTROLLED TYPE 2 DIABETES MELLITUS WITH HYPERGLYCEMIA (HCC): ICD-10-CM

## 2023-02-09 DIAGNOSIS — Z00.00 MEDICARE ANNUAL WELLNESS VISIT, SUBSEQUENT: Primary | ICD-10-CM

## 2023-02-09 RX ORDER — METFORMIN HYDROCHLORIDE 500 MG/1
1000 TABLET, EXTENDED RELEASE ORAL 2 TIMES DAILY
Qty: 360 TABLET | Refills: 0 | Status: SHIPPED | OUTPATIENT
Start: 2023-02-09

## 2023-02-09 RX ORDER — PREGABALIN 200 MG/1
CAPSULE ORAL
Qty: 90 CAPSULE | Refills: 0 | Status: SHIPPED | OUTPATIENT
Start: 2023-02-09

## 2023-02-09 RX ORDER — SOLIFENACIN SUCCINATE 5 MG/1
5 TABLET, FILM COATED ORAL DAILY
Qty: 90 TABLET | Refills: 0 | Status: SHIPPED | OUTPATIENT
Start: 2023-02-09

## 2023-02-09 RX ORDER — CLONAZEPAM 1 MG/1
1 TABLET ORAL EVERY EVENING
Qty: 30 TABLET | Refills: 3 | Status: SHIPPED | OUTPATIENT
Start: 2023-02-09

## 2023-02-09 RX ORDER — POTASSIUM CHLORIDE 750 MG/1
TABLET, FILM COATED, EXTENDED RELEASE ORAL
Qty: 180 TABLET | Refills: 1 | Status: SHIPPED | OUTPATIENT
Start: 2023-02-09

## 2023-02-09 NOTE — ASSESSMENT & PLAN NOTE
Lab Results   Component Value Date    HGBA1C 6 2 (H) 12/06/2022   Diabetes under stable control A1c is 6 2 now patient doing well overall with metformin and Januvia she monitors blood glucose periodically    Continue with diet

## 2023-02-09 NOTE — PROGRESS NOTES
Assessment and Plan:     Problem List Items Addressed This Visit        Digestive    GERD without esophagitis     GERD without esophagitis doing well currently patient will continue with pantoprazole 40 mg            Endocrine    Type 2 diabetes mellitus with diabetic polyneuropathy ([facility] [address] )       Lab Results   Component Value Date    HGBA1C 6 2 (H) 12/06/2022   Diabetes under stable control A1c is 6 2 now patient doing well overall with metformin and Januvia she monitors blood glucose periodically  Continue with diet         Relevant Medications    pregabalin (LYRICA) 200 MG capsule    metFORMIN (GLUCOPHAGE-XR) 500 mg 24 hr tablet    Other Relevant Orders    Hemoglobin A1C    Comprehensive metabolic panel    Microalbumin / creatinine urine ratio    TSH, 3rd generation with Free T4 reflex    Lipid Panel with Direct LDL reflex       Cardiovascular and Mediastinum    Essential hypertension     Hypertension under stable control continue with same medication regimen hydrochlorothiazide         Relevant Medications    potassium chloride (Klor-Con) 10 mEq tablet       Other    Anxiety and depression    Relevant Medications    clonazePAM (KlonoPIN) 1 mg tablet    Medicare annual wellness visit, subsequent - Primary   Other Visit Diagnoses     Mixed stress and urge urinary incontinence        Relevant Medications    solifenacin (VESICARE) 5 mg tablet    Uncontrolled type 2 diabetes mellitus with hyperglycemia (HCC)        Relevant Medications    metFORMIN (GLUCOPHAGE-XR) 500 mg 24 hr tablet           Preventive health issues were discussed with patient, and age appropriate screening tests were ordered as noted in patient's After Visit Summary  Personalized health advice and appropriate referrals for health education or preventive services given if needed, as noted in patient's After Visit Summary       History of Present Illness:     Patient presents for a Medicare Wellness Visit    Patient presents today for follow-up evaluation Medicare wellness visit and review of lab work medication refills at this time also     Patient Care Team:  Marly Carter DO as PCP - General (Family Medicine)  Marly Carter DO as PCP - [address] [place] [address] (RTE)  Marly Carter DO as PCP - PCP-[facility] (RTE)  Jace Rachel PA-C as Physician Assistant (Physician Assistant)     Review of Systems:     Review of Systems   Constitutional: Negative for chills, fatigue and fever  HENT: Negative for congestion, nosebleeds, rhinorrhea, sinus pressure and sore throat  Eyes: Negative for discharge and redness  Respiratory: Negative for cough and shortness of breath  Cardiovascular: Negative for chest pain, palpitations and leg swelling  Gastrointestinal: Negative for abdominal pain, blood in stool and nausea  Endocrine: Negative for cold intolerance, heat intolerance and polyuria  Genitourinary: Negative for dysuria and frequency  Musculoskeletal: Negative for arthralgias, back pain and myalgias  Skin: Negative for rash  Neurological: Negative for dizziness, weakness and headaches  Hematological: Negative for adenopathy  Psychiatric/Behavioral: Negative for behavioral problems and sleep disturbance  The patient is not nervous/anxious  Problem List:     Patient Active Problem List   Diagnosis   • Type 2 diabetes mellitus with diabetic polyneuropathy ([facility] )   • Essential hypertension   • Anxiety and depression   • Medicare annual wellness visit, subsequent   • GERD without esophagitis   • Acute bacterial bronchitis   • Reactive airway disease with acute exacerbation   • Cough   • Chest wall pain      Past Medical and Surgical History:     Past Medical History:   Diagnosis Date   • Anxiety    • Diabetes mellitus ([facility] [address] )    • Hypertension    • IBS (irritable bowel syndrome)      History reviewed  No pertinent surgical history     Family History:     Family History   Problem Relation Age of Onset   • Emphysema Mother    • COPD Mother    • Emphysema Father       Social History:     Social History     Socioeconomic History   • Marital status: /Civil Union     Spouse name: None   • Number of children: None   • Years of education: None   • Highest education level: None   Occupational History   • None   Tobacco Use   • Smoking status: Every Day     Packs/day: 1 00     Years: 50 00     Pack years: 50 00     Types: Cigarettes     Start date: 1961   • Smokeless tobacco: Never   • Tobacco comments:     on & off    Vaping Use   • Vaping Use: Never used   Substance and Sexual Activity   • Alcohol use: Not Currently   • Drug use: Never   • Sexual activity: None   Other Topics Concern   • None   Social History Narrative   • None     Social Determinants of Health     Financial Resource Strain: Low Risk    • Difficulty of Paying Living Expenses: Not hard at all   Food Insecurity: Not on file   Transportation Needs: No Transportation Needs   • Lack of Transportation (Medical): No   • Lack of Transportation (Non-Medical):  No   Physical Activity: Not on file   Stress: Not on file   Social Connections: Not on file   Intimate Partner Violence: Not on file   Housing Stability: Not on file      Medications and Allergies:     Current Outpatient Medications   Medication Sig Dispense Refill   • acetaminophen (TYLENOL) 325 mg tablet Take 650 mg by mouth every 6 (six) hours as needed for mild pain     • Apoaequorin (Prevagen Extra Strength) 20 MG CAPS Take 300 mL/day by mouth     • Aspirin (ASPIR-81 PO) take one by mouth daily     • Blood Glucose Monitoring Suppl (ONETOUCH VERIO) w/Device KIT by Does not apply route 2 (two) times a day 1 kit 0   • buPROPion (WELLBUTRIN XL) 300 mg 24 hr tablet TAKE ONE TABLET BY MOUTH EVERY DAY 90 tablet 1   • clonazePAM (KlonoPIN) 1 mg tablet Take 1 tablet (1 mg total) by mouth every evening 30 tablet 3   • dexlansoprazole (DEXILANT) 60 MG capsule Take 1 capsule (60 mg total) by mouth daily 30 capsule 5   • gabapentin (NEURONTIN) 400 mg capsule Take 1 capsule (400 mg total) by mouth 2 (two) times a day 180 capsule 0   • gabapentin (Neurontin) 600 MG tablet Take 1 tablet (600 mg total) by mouth every evening 90 tablet 0   • glucose blood (OneTouch Verio) test strip TEST BLOOD GLUCOSE TWICE A  strip 5   • hydrochlorothiazide (HYDRODIURIL) 50 mg tablet TAKE ONE TABLET BY MOUTH EVERY DAY 90 tablet 3   • Lancets (OneTouch Delica Plus [id number]) MISC TEST TWICE A  each 5   • metFORMIN (GLUCOPHAGE-XR) 500 mg 24 hr tablet Take 2 tablets (1,000 mg total) by mouth 2 (two) times a day 360 tablet 0   • naproxen sodium (ALEVE) 220 MG tablet Take 220 mg by mouth     • niacin 500 mg ER capsule Take 500 mg by mouth daily at bedtime     • Omega-3 Fatty Acids (FISH OIL PO) take one tab/cap by mouth daily     • pantoprazole (PROTONIX) 40 mg tablet Take 1 tablet (40 mg total) by mouth daily 90 tablet 3   • potassium chloride (Klor-Con) 10 mEq tablet TAKE 2 TABLETS BY MOUTH ONCE DAILY 180 tablet 1   • pregabalin (LYRICA) 200 MG capsule 3 times daily 90 capsule 0   • sitaGLIPtin (Januvia) 100 mg tablet Take 1 tablet (100 mg total) by mouth daily 30 tablet 3   • solifenacin (VESICARE) 5 mg tablet Take 1 tablet (5 mg total) by mouth daily 90 tablet 0   • sucralfate (CARAFATE) 1 g tablet Take 1 g by mouth 4 (four) times a day       No current facility-administered medications for this visit  Allergies   Allergen Reactions   • Adhesive [Medical Tape]    • Amoxicillin-Pot Clavulanate Hives     Patient states that she is not allergic to this medication     • Latex    • Paroxetine    • Sulfa Antibiotics    • Sulfamethoxazole-Trimethoprim Hives      Immunizations:     Immunization History   Administered Date(s) Administered   • COVID-19 MODERNA VACC 0 5 ML IM 04/26/2021, 05/24/2021, 12/13/2021   • COVID-19 Moderna Vac BIVALENT 12 Yr+ IM (BOOSTER ONLY) 0 5 ML 12/07/2022   • Hep B / HiB 03/27/2013, 04/30/2013, 10/03/2013   • INFLUENZA 03/24/2014, 10/18/2022   • Influenza, high dose seasonal 0 7 mL 11/07/2019, 10/02/2020, 10/27/2021, 10/18/2022   • Pneumococcal Conjugate 13-Valent 01/17/2020   • Tdap 01/29/2017      Health Maintenance:         Topic Date Due   • Lung Cancer Screening  Never done   • Breast Cancer Screening: Mammogram  10/03/2023   • Colorectal Cancer Screening  09/15/2026   • Hepatitis C Screening  Completed         Topic Date Due   • Pneumococcal Vaccine: 65+ Years (2 - PPSV23 if available, else PCV20) 01/17/2021   • COVID-19 Vaccine (4 - Booster for Moderna series) 02/01/2023      Medicare Screening Tests and Risk Assessments: Dano Gannon is here for her Initial Wellness visit  Last Medicare Wellness visit information reviewed, patient interviewed, no change since last AWV  PREVENTIVE SCREENINGS      Cardiovascular Screening:    General: Screening Current      Diabetes Screening:     General: Screening Not Indicated and History Diabetes      Colorectal Cancer Screening:     General: Screening Current      Breast Cancer Screening:     General: Screening Current      Cervical Cancer Screening:    General: Screening Not Indicated      Hepatitis C Screening:    General: Screening Current    No results found  Physical Exam:     /78 (BP Location: Left arm, Patient Position: Sitting, Cuff Size: Standard)   Pulse 81   Temp 98 2 °F (36 8 °C) (Tympanic)   Resp 18   Ht 5' 1" (1 549 m)   Wt 74 9 kg (165 lb 3 2 oz)   SpO2 95%   BMI 31 21 kg/m²     Physical Exam  Vitals and nursing note reviewed  Constitutional:       General: She is not in acute distress  Appearance: Normal appearance  She is well-developed and normal weight  HENT:      Head: Normocephalic and atraumatic        Right Ear: Tympanic membrane, ear canal and external ear normal       Left Ear: Tympanic membrane, ear canal and external ear normal       Nose: Nose normal       Mouth/Throat:      Mouth: Mucous membranes are moist       Pharynx: Oropharynx is clear  Eyes:      Extraocular Movements: Extraocular movements intact  Conjunctiva/sclera: Conjunctivae normal       Pupils: Pupils are equal, round, and reactive to light  Cardiovascular:      Rate and Rhythm: Normal rate and regular rhythm  Pulses: Normal pulses  Heart sounds: No murmur heard  Pulmonary:      Effort: Pulmonary effort is normal  No respiratory distress  Breath sounds: Normal breath sounds  Abdominal:      General: Bowel sounds are normal       Palpations: Abdomen is soft  Tenderness: There is no abdominal tenderness  Musculoskeletal:         General: No swelling  Normal range of motion  Cervical back: Normal range of motion and neck supple  Skin:     General: Skin is warm and dry  Capillary Refill: Capillary refill takes less than 2 seconds  Neurological:      General: No focal deficit present  Mental Status: She is alert and oriented to person, place, and time  Mental status is at baseline  Psychiatric:         Mood and Affect: Mood normal          Behavior: Behavior normal          Thought Content:  Thought content normal          Judgment: Judgment normal           Ulises Moy DO

## 2023-03-23 ENCOUNTER — RA CDI HCC (OUTPATIENT)
Dept: OTHER | Facility: HOSPITAL | Age: 78
End: 2023-03-23

## 2023-03-24 NOTE — PROGRESS NOTES
[facility]  coding opportunities       Chart reviewed, no opportunity found:   [address]        Patients Insurance     Medicare Insurance: [employer]

## 2023-04-13 PROBLEM — R32 URINARY INCONTINENCE: Status: ACTIVE | Noted: 2023-04-13

## 2023-05-14 DIAGNOSIS — E11.65 UNCONTROLLED TYPE 2 DIABETES MELLITUS WITH HYPERGLYCEMIA (HCC): ICD-10-CM

## 2023-05-15 RX ORDER — METFORMIN HYDROCHLORIDE 500 MG/1
TABLET, EXTENDED RELEASE ORAL
Qty: 360 TABLET | Refills: 0 | Status: SHIPPED | OUTPATIENT
Start: 2023-05-15

## 2023-05-22 ENCOUNTER — APPOINTMENT (OUTPATIENT)
Dept: RADIOLOGY | Facility: CLINIC | Age: 78
End: 2023-05-22
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
## 2023-05-22 NOTE — LETTER
[address] [place] [address]  [address] [facility] [address]      May 30, 2023    MRN: [id number]     Phone: [phone]     Dear Ms  Jaimie Angeles recently had a(n) Diagnostic Imaging performed on 5/22/2023 at  [address] [place] [address] that was requested by Manda Lucas  The study was reviewed by a radiologist, which is a physician who specializes in medical imaging  The radiologist issued a report describing his or her findings  In that report there was a finding that the radiologist felt warranted further discussion with your health care provider and that discussion would be beneficial to you  The results were sent to Ramon Cobb on 05/23/2023  1:49 PM  We recommend that you contact Ramon Cobb at [phone] or set up an appointment to discuss the results of the imaging test  If you have already heard from Manda Lucas regarding the results of your study, you can disregard this letter  This letter is not meant to alarm you, but intended to encourage you to follow-up on your results with the provider that sent you for the imaging study  In addition, we have enclosed answers to frequently asked questions by other patients who have also received a letter to review results with their health care provider (see page two)  Thank you for choosing [address] [place] [address] for your medical imaging needs  FREQUENTLY ASKED QUESTIONS    Why am I receiving this letter? [facility] requires us to notify patients who have findings on imaging exams that may require more testing or follow-up with a health professional within the next 3 months          How serious is the finding on the imaging test?  This letter is sent to all patients who may need follow-up or more testing within the next 3 months  Receiving this letter does not necessarily mean you have a life-threatening imaging finding or disease  Recommendations in the radiologist’s imaging report are general in nature and it is up to your healthcare provider to say whether those recommendations make sense for your situation  You are strongly encouraged to talk to your health care provider about the results and ask whether additional steps need to be taken  Where can I get a copy of the final report for my recent radiology exam?  To get a full copy of the report you can access your records online at [email or web address] or please contact [facility] at [phone] Monday through Friday between 8 am and 6 pm          What do I need to do now? Please contact your health care provider who requested the imaging study to discuss what further actions (if any) are needed  You may have already heard from (your ordering provider) in regard to this test in which case you can disregard this letter  NOTICE IN ACCORDANCE WITH THE [place] STATE “PATIENT TEST RESULT INFORMATION ACT OF 2018”    You are receiving this notice as a result of a determination by your diagnostic imaging service that further discussions of your test results are warranted and would be beneficial to you  The complete results of your test or tests have been or will be sent to the health care practitioner that ordered the test or tests  It is recommended that you contact your health care practitioner to discuss your results as soon as possible

## 2023-05-23 NOTE — RESULT ENCOUNTER NOTE
Please let her know her x-ray is confirming a fracture  Offer follow-up in office visit if able  Radiology is also recommending lumbar spine x-rays due to severe degenerative changes, if agreeable I will order

## 2023-05-24 ENCOUNTER — OFFICE VISIT (OUTPATIENT)
Dept: FAMILY MEDICINE CLINIC | Facility: CLINIC | Age: 78
End: 2023-05-24

## 2023-05-24 VITALS
SYSTOLIC BLOOD PRESSURE: 171 MMHG | HEIGHT: 61 IN | DIASTOLIC BLOOD PRESSURE: 72 MMHG | BODY MASS INDEX: 30.7 KG/M2 | TEMPERATURE: 98.2 F | HEART RATE: 85 BPM | OXYGEN SATURATION: 97 % | WEIGHT: 162.6 LBS
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
## 2023-05-24 DIAGNOSIS — S32.2XXA CLOSED FRACTURE OF COCCYX, INITIAL ENCOUNTER (HCC): Primary | ICD-10-CM

## 2023-05-24 DIAGNOSIS — I10 ESSENTIAL HYPERTENSION: ICD-10-CM

## 2023-05-24 DIAGNOSIS — M51.36 DEGENERATIVE LUMBAR DISC: ICD-10-CM
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
## 2023-05-24 PROBLEM — M51.369 DEGENERATIVE LUMBAR DISC: Status: ACTIVE | Noted: 2023-05-24

## 2023-05-24 NOTE — PROGRESS NOTES
OFFICE VISIT  Margot Cooney 69 y o  female MRN: [id number]          Assessment / Plan:  Problem List Items Addressed This Visit        Cardiovascular and Mediastinum    Essential hypertension     Blood pressure elevated in office today, she did report she will check her blood pressure once her pain is under better control at home, she will call the office if remains elevated  Smoking cessation advised along with low-sodium diet            Musculoskeletal and Integument    Closed fracture of coccyx (HCC) - Primary     Using Biofreeze, Yovanny  Will order a cushion  To be sent to DME  Relevant Medications    Misc  Devices (Carex Coccyx Cushion) MISC    Other Relevant Orders    DXA bone density spine hip and pelvis    Degenerative lumbar disc     Declined any further work-up due to abnormal x-ray, is seeing a chiropractor every 2 weeks and pleased with results  Other    Fall at home         Reason For Visit / Chief Complaint  Chief Complaint   Patient presents with   • Follow-up     Xray follow up from fall         HPI:  Margot Cooney is a 69 y o  female who presents today for a fall that occurred last week, she reports sitting on her porch, she got up and had immediate pain in her tail bone  She denies hitting her head, no LOC  she called the office Monday with buttocks pain, xray completed  She has know back pain, she reports seeing the chiropractor every two weeks  She denies numbness or tingling In her legs  Historical Information   Past Medical History:   Diagnosis Date   • Anxiety    • Diabetes mellitus ([address] )    • Hypertension    • IBS (irritable bowel syndrome)      History reviewed  No pertinent surgical history    Social History   Social History     Substance and Sexual Activity   Alcohol Use Not Currently     Social History     Substance and Sexual Activity   Drug Use Never     Social History     Tobacco Use   Smoking Status Every Day   • Packs/day: 1 00   • Years: 50 00   • Total pack years: 50 00   • Types: Cigarettes   • Start date: 1961   Smokeless Tobacco Never   Tobacco Comments    on & off      Family History   Problem Relation Age of Onset   • Emphysema Mother    • COPD Mother    • Emphysema Father        Meds/Allergies   Allergies   Allergen Reactions   • Adhesive [Medical Tape]    • Amoxicillin-Pot Clavulanate Hives     Patient states that she is not allergic to this medication     • Latex    • Paroxetine    • Sulfa Antibiotics    • Sulfamethoxazole-Trimethoprim Hives       Meds:    Current Outpatient Medications:   •  acetaminophen (TYLENOL) 325 mg tablet, Take 650 mg by mouth every 6 (six) hours as needed for mild pain, Disp: , Rfl:   •  Apoaequorin (Prevagen Extra Strength) 20 MG CAPS, Take 300 mL/day by mouth, Disp: , Rfl:   •  Aspirin (ASPIR-81 PO), take one by mouth daily, Disp: , Rfl:   •  Blood Glucose Monitoring Suppl (ONETOUCH VERIO) w/Device KIT, by Does not apply route 2 (two) times a day, Disp: 1 kit, Rfl: 0  •  buPROPion (WELLBUTRIN XL) 300 mg 24 hr tablet, TAKE ONE TABLET BY MOUTH EVERY DAY, Disp: 90 tablet, Rfl: 1  •  clonazePAM (KlonoPIN) 1 mg tablet, Take 1 tablet (1 mg total) by mouth every evening, Disp: 30 tablet, Rfl: 3  •  dexlansoprazole (DEXILANT) 60 MG capsule, Take 1 capsule (60 mg total) by mouth daily, Disp: 30 capsule, Rfl: 5  •  gabapentin (NEURONTIN) 400 mg capsule, Take 1 capsule (400 mg total) by mouth 2 (two) times a day, Disp: 180 capsule, Rfl: 0  •  gabapentin (Neurontin) 600 MG tablet, Take 1 tablet (600 mg total) by mouth every evening, Disp: 90 tablet, Rfl: 0  •  glucose blood (OneTouch Verio) test strip, TEST BLOOD GLUCOSE TWICE A DAY, Disp: 100 strip, Rfl: 5  •  hydrochlorothiazide (HYDRODIURIL) 50 mg tablet, TAKE ONE TABLET BY MOUTH EVERY DAY, Disp: 90 tablet, Rfl: 3  •  Januvia 100 MG tablet, TAKE ONE TABLET BY MOUTH EVERY DAY, Disp: 90 tablet, Rfl: 2  •  Lancets (OneTouch Delica Plus [id number]) MISC, TEST TWICE A DAY, Disp: 100 each, Rfl: 5  •  metFORMIN (GLUCOPHAGE-XR) 500 mg 24 hr tablet, TAKE TWO TABLETS BY MOUTH TWICE A DAY, Disp: 360 tablet, Rfl: 0  •  Misc  Devices (Carex Coccyx Cushion) MISC, Use in the morning, Disp: 1 each, Rfl: 0  •  naproxen sodium (ALEVE) 220 MG tablet, Take 220 mg by mouth, Disp: , Rfl:   •  niacin 500 mg ER capsule, Take 500 mg by mouth daily at bedtime, Disp: , Rfl:   •  Omega-3 Fatty Acids (FISH OIL PO), take one tab/cap by mouth daily, Disp: , Rfl:   •  pantoprazole (PROTONIX) 40 mg tablet, Take 1 tablet (40 mg total) by mouth daily, Disp: 90 tablet, Rfl: 3  •  potassium chloride (Klor-Con) 10 mEq tablet, TAKE TWO TABLETS BY MOUTH EVERY DAY, Disp: 180 tablet, Rfl: 1  •  pregabalin (LYRICA) 200 MG capsule, 3 times daily, Disp: 90 capsule, Rfl: 0  •  solifenacin (VESICARE) 5 mg tablet, Take 1 tablet (5 mg total) by mouth daily, Disp: 90 tablet, Rfl: 0  •  sucralfate (CARAFATE) 1 g tablet, Take 1 g by mouth 4 (four) times a day, Disp: , Rfl:       REVIEW OF SYSTEMS  Review of Systems   Constitutional: Negative for chills, fatigue and fever  HENT: Negative for congestion, ear discharge, ear pain, sore throat, trouble swallowing and voice change  Eyes: Negative for pain and redness  Respiratory: Negative for cough, chest tightness, shortness of breath and wheezing  Gastrointestinal: Negative for abdominal pain, blood in stool, constipation, diarrhea, nausea and vomiting  Endocrine: Negative for cold intolerance, heat intolerance, polydipsia, polyphagia and polyuria  Genitourinary: Negative for decreased urine volume, dysuria, frequency and urgency  Musculoskeletal: Positive for arthralgias, back pain and gait problem  Negative for myalgias and neck pain  Tailbone pain   Skin: Negative for color change and rash  Neurological: Negative for dizziness, syncope, weakness, light-headedness, numbness and headaches  Psychiatric/Behavioral: Negative for sleep disturbance and suicidal ideas   The patient "is not nervous/anxious  Current Vitals:   Blood Pressure: (!) 171/72 (05/24/23 0906)  Pulse: 85 (05/24/23 0906)  Temperature: 98 2 °F (36 8 °C) (05/24/23 0906)  Temp Source: Tympanic (05/24/23 0906)  Height: 5' 1\" (154 9 cm) (05/24/23 0906)  Weight - Scale: 73 8 kg (162 lb 9 6 oz) (05/24/23 0906)  SpO2: 97 % (05/24/23 0906)  [unfilled]    PHYSICAL EXAMS:  Physical Exam  Vitals and nursing note reviewed  Constitutional:       Appearance: She is well-developed  She is obese  HENT:      Head: Normocephalic and atraumatic  Cardiovascular:      Rate and Rhythm: Normal rate and regular rhythm  Pulses: Normal pulses  Pulmonary:      Effort: Pulmonary effort is normal       Breath sounds: Normal breath sounds  No wheezing or rhonchi  Abdominal:      General: There is no distension  Palpations: Abdomen is soft  Tenderness: There is no abdominal tenderness  Musculoskeletal:         General: Tenderness present  No swelling, deformity or signs of injury  Cervical back: Normal range of motion  Right lower leg: No edema  Left lower leg: No edema  Comments: Sacral area   Skin:     Findings: Bruising present  No erythema, lesion or rash  Neurological:      General: No focal deficit present  Mental Status: She is alert and oriented to person, place, and time  Psychiatric:         Mood and Affect: Mood normal          Behavior: Behavior normal          Thought Content: Thought content normal          Judgment: Judgment normal              Lab, imaging and other studies: I have personally reviewed pertinent films in PACS               "

## 2023-05-24 NOTE — ASSESSMENT & PLAN NOTE
Blood pressure elevated in office today, she did report she will check her blood pressure once her pain is under better control at home, she will call the office if remains elevated    Smoking cessation advised along with low-sodium diet

## 2023-05-24 NOTE — ASSESSMENT & PLAN NOTE
Declined any further work-up due to abnormal x-ray, is seeing a chiropractor every 2 weeks and pleased with results

## 2023-06-07 ENCOUNTER — APPOINTMENT (OUTPATIENT)
Dept: RADIOLOGY | Facility: CLINIC | Age: 78
End: 2023-06-07
Payer: COMMERCIAL
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
## 2023-06-07 PROCEDURE — 73522 X-RAY EXAM HIPS BI 3-4 VIEWS: CPT

## 2023-06-12 DIAGNOSIS — F41.9 ANXIETY AND DEPRESSION: ICD-10-CM

## 2023-06-12 DIAGNOSIS — F32.A ANXIETY AND DEPRESSION: ICD-10-CM

## 2023-06-12 DIAGNOSIS — E11.42 TYPE 2 DIABETES MELLITUS WITH DIABETIC POLYNEUROPATHY, WITHOUT LONG-TERM CURRENT USE OF INSULIN (HCC): ICD-10-CM

## 2023-06-12 RX ORDER — BUPROPION HYDROCHLORIDE 300 MG/1
TABLET ORAL
Qty: 90 TABLET | Refills: 1 | Status: SHIPPED | OUTPATIENT
Start: 2023-06-12

## 2023-06-12 RX ORDER — PREGABALIN 200 MG/1
CAPSULE ORAL
Qty: 90 CAPSULE | Refills: 0 | Status: SHIPPED | OUTPATIENT
Start: 2023-06-12

## 2023-06-27 ENCOUNTER — TELEPHONE (OUTPATIENT)
Dept: FAMILY MEDICINE CLINIC | Facility: CLINIC | Age: 78
End: 2023-06-27

## 2023-06-27 DIAGNOSIS — E11.42 TYPE 2 DIABETES MELLITUS WITH DIABETIC POLYNEUROPATHY, WITHOUT LONG-TERM CURRENT USE OF INSULIN (HCC): Primary | ICD-10-CM

## 2023-06-27 DIAGNOSIS — R32 URINARY INCONTINENCE, UNSPECIFIED TYPE: ICD-10-CM

## 2023-06-27 NOTE — TELEPHONE ENCOUNTER
Pt called asking if you can add a urine test to her lab work that she will be getting done before her apt on 07/24  Pt reports that her urine has been a darker color lately       Can you please order a urine test for this issue   Ty

## 2023-07-05 ENCOUNTER — APPOINTMENT (OUTPATIENT)
Dept: LAB | Facility: CLINIC | Age: 78
End: 2023-07-05
Payer: COMMERCIAL

## 2023-07-05 DIAGNOSIS — E11.42 TYPE 2 DIABETES MELLITUS WITH DIABETIC POLYNEUROPATHY, WITHOUT LONG-TERM CURRENT USE OF INSULIN (HCC): ICD-10-CM

## 2023-07-05 DIAGNOSIS — R32 URINARY INCONTINENCE, UNSPECIFIED TYPE: ICD-10-CM

## 2023-07-05 DIAGNOSIS — K21.9 GERD WITHOUT ESOPHAGITIS: ICD-10-CM

## 2023-07-05 DIAGNOSIS — I10 ESSENTIAL HYPERTENSION: ICD-10-CM

## 2023-07-05 DIAGNOSIS — Z13.820 SCREENING FOR OSTEOPOROSIS: ICD-10-CM

## 2023-07-05 LAB
ALBUMIN SERPL BCP-MCNC: 3.8 G/DL (ref 3.5–5)
ALP SERPL-CCNC: 65 U/L (ref 46–116)
ALT SERPL W P-5'-P-CCNC: 22 U/L (ref 12–78)
ANION GAP SERPL CALCULATED.3IONS-SCNC: 5 MMOL/L
AST SERPL W P-5'-P-CCNC: 19 U/L (ref 5–45)
BACTERIA UR QL AUTO: ABNORMAL /HPF
BILIRUB SERPL-MCNC: 0.38 MG/DL (ref 0.2–1)
BILIRUB UR QL STRIP: NEGATIVE
BUN SERPL-MCNC: 11 MG/DL (ref 5–25)
CALCIUM SERPL-MCNC: 10.4 MG/DL (ref 8.3–10.1)
CHLORIDE SERPL-SCNC: 98 MMOL/L (ref 96–108)
CHOLEST SERPL-MCNC: 138 MG/DL
CLARITY UR: ABNORMAL
CO2 SERPL-SCNC: 30 MMOL/L (ref 21–32)
COLOR UR: ABNORMAL
CREAT SERPL-MCNC: 0.58 MG/DL (ref 0.6–1.3)
CREAT UR-MCNC: 73.7 MG/DL
ERYTHROCYTE [DISTWIDTH] IN BLOOD BY AUTOMATED COUNT: 13.2 % (ref 11.6–15.1)
GFR SERPL CREATININE-BSD FRML MDRD: 89 ML/MIN/1.73SQ M
GLUCOSE P FAST SERPL-MCNC: 86 MG/DL (ref 65–99)
GLUCOSE UR STRIP-MCNC: NEGATIVE MG/DL
HCT VFR BLD AUTO: 46.9 % (ref 34.8–46.1)
HDLC SERPL-MCNC: 40 MG/DL
HGB BLD-MCNC: 15.4 G/DL (ref 11.5–15.4)
HGB UR QL STRIP.AUTO: ABNORMAL
KETONES UR STRIP-MCNC: NEGATIVE MG/DL
LDLC SERPL CALC-MCNC: 48 MG/DL (ref 0–100)
LEUKOCYTE ESTERASE UR QL STRIP: NEGATIVE
MCH RBC QN AUTO: 31.6 PG (ref 26.8–34.3)
MCHC RBC AUTO-ENTMCNC: 32.8 G/DL (ref 31.4–37.4)
MCV RBC AUTO: 96 FL (ref 82–98)
MICROALBUMIN UR-MCNC: 61.5 MG/L (ref 0–20)
MICROALBUMIN/CREAT 24H UR: 83 MG/G CREATININE (ref 0–30)
MUCOUS THREADS UR QL AUTO: ABNORMAL
NITRITE UR QL STRIP: NEGATIVE
NON-SQ EPI CELLS URNS QL MICRO: ABNORMAL /HPF
PH UR STRIP.AUTO: 6 [PH]
PLATELET # BLD AUTO: 297 THOUSANDS/UL (ref 149–390)
PMV BLD AUTO: 10.2 FL (ref 8.9–12.7)
POTASSIUM SERPL-SCNC: 4 MMOL/L (ref 3.5–5.3)
PROT SERPL-MCNC: 8 G/DL (ref 6.4–8.4)
PROT UR STRIP-MCNC: ABNORMAL MG/DL
RBC # BLD AUTO: 4.87 MILLION/UL (ref 3.81–5.12)
RBC #/AREA URNS AUTO: ABNORMAL /HPF
SODIUM SERPL-SCNC: 133 MMOL/L (ref 135–147)
SP GR UR STRIP.AUTO: 1.01 (ref 1–1.03)
TRIGL SERPL-MCNC: 248 MG/DL
TSH SERPL DL<=0.05 MIU/L-ACNC: 2.89 UIU/ML (ref 0.45–4.5)
UROBILINOGEN UR STRIP-ACNC: <2 MG/DL
WBC # BLD AUTO: 11.31 THOUSAND/UL (ref 4.31–10.16)
WBC #/AREA URNS AUTO: ABNORMAL /HPF

## 2023-07-05 PROCEDURE — 83036 HEMOGLOBIN GLYCOSYLATED A1C: CPT

## 2023-07-05 PROCEDURE — 85027 COMPLETE CBC AUTOMATED: CPT

## 2023-07-05 PROCEDURE — 80053 COMPREHEN METABOLIC PANEL: CPT

## 2023-07-05 PROCEDURE — 80061 LIPID PANEL: CPT

## 2023-07-05 PROCEDURE — 82570 ASSAY OF URINE CREATININE: CPT

## 2023-07-05 PROCEDURE — 82043 UR ALBUMIN QUANTITATIVE: CPT

## 2023-07-05 PROCEDURE — 87086 URINE CULTURE/COLONY COUNT: CPT

## 2023-07-05 PROCEDURE — 81001 URINALYSIS AUTO W/SCOPE: CPT

## 2023-07-05 PROCEDURE — 36415 COLL VENOUS BLD VENIPUNCTURE: CPT

## 2023-07-05 PROCEDURE — 84443 ASSAY THYROID STIM HORMONE: CPT

## 2023-07-07 LAB
BACTERIA UR CULT: NORMAL
EST. AVERAGE GLUCOSE BLD GHB EST-MCNC: 128 MG/DL
HBA1C MFR BLD: 6.1 %

## 2023-07-24 ENCOUNTER — OFFICE VISIT (OUTPATIENT)
Dept: FAMILY MEDICINE CLINIC | Facility: CLINIC | Age: 78
End: 2023-07-24
Payer: COMMERCIAL

## 2023-07-24 ENCOUNTER — RA CDI HCC (OUTPATIENT)
Dept: OTHER | Facility: HOSPITAL | Age: 78
End: 2023-07-24

## 2023-07-24 VITALS
DIASTOLIC BLOOD PRESSURE: 80 MMHG | WEIGHT: 162.4 LBS | HEART RATE: 80 BPM | BODY MASS INDEX: 30.66 KG/M2 | HEIGHT: 61 IN | OXYGEN SATURATION: 95 % | TEMPERATURE: 98.2 F | SYSTOLIC BLOOD PRESSURE: 140 MMHG | RESPIRATION RATE: 18 BRPM

## 2023-07-24 DIAGNOSIS — K21.9 GERD WITHOUT ESOPHAGITIS: ICD-10-CM

## 2023-07-24 DIAGNOSIS — F41.9 ANXIETY AND DEPRESSION: ICD-10-CM

## 2023-07-24 DIAGNOSIS — S32.2XXD CLOSED FRACTURE OF COCCYX WITH ROUTINE HEALING, SUBSEQUENT ENCOUNTER: Primary | ICD-10-CM

## 2023-07-24 DIAGNOSIS — N39.46 MIXED STRESS AND URGE URINARY INCONTINENCE: ICD-10-CM

## 2023-07-24 DIAGNOSIS — I10 ESSENTIAL HYPERTENSION: ICD-10-CM

## 2023-07-24 DIAGNOSIS — E11.42 TYPE 2 DIABETES MELLITUS WITH DIABETIC POLYNEUROPATHY, WITHOUT LONG-TERM CURRENT USE OF INSULIN (HCC): ICD-10-CM

## 2023-07-24 DIAGNOSIS — E11.65 UNCONTROLLED TYPE 2 DIABETES MELLITUS WITH HYPERGLYCEMIA (HCC): ICD-10-CM

## 2023-07-24 DIAGNOSIS — F32.A ANXIETY AND DEPRESSION: ICD-10-CM

## 2023-07-24 PROCEDURE — 99214 OFFICE O/P EST MOD 30 MIN: CPT | Performed by: FAMILY MEDICINE

## 2023-07-24 RX ORDER — SOLIFENACIN SUCCINATE 5 MG/1
5 TABLET, FILM COATED ORAL DAILY
Qty: 90 TABLET | Refills: 0 | Status: SHIPPED | OUTPATIENT
Start: 2023-07-24

## 2023-07-24 RX ORDER — POTASSIUM CHLORIDE 750 MG/1
TABLET, FILM COATED, EXTENDED RELEASE ORAL
Qty: 180 TABLET | Refills: 1 | Status: SHIPPED | OUTPATIENT
Start: 2023-07-24

## 2023-07-24 RX ORDER — CLONAZEPAM 1 MG/1
1 TABLET ORAL EVERY EVENING
Qty: 30 TABLET | Refills: 3 | Status: SHIPPED | OUTPATIENT
Start: 2023-07-24

## 2023-07-24 RX ORDER — NITROFURANTOIN 25; 75 MG/1; MG/1
100 CAPSULE ORAL 2 TIMES DAILY
Qty: 14 CAPSULE | Refills: 1 | Status: SHIPPED | OUTPATIENT
Start: 2023-07-24

## 2023-07-24 RX ORDER — METFORMIN HYDROCHLORIDE 500 MG/1
1000 TABLET, EXTENDED RELEASE ORAL 2 TIMES DAILY
Qty: 360 TABLET | Refills: 0 | Status: SHIPPED | OUTPATIENT
Start: 2023-07-24

## 2023-07-24 NOTE — ASSESSMENT & PLAN NOTE
Overall less pain in the sacrum doing better since her fall in May she is healing has some discomfort but no worsening symptoms she will continue her current plan of treatment

## 2023-07-24 NOTE — ASSESSMENT & PLAN NOTE
Pretension stable control doing well overall with current hydrochlorothiazide combination and reduction of sodium intake follow-up with me at next visit in 4 months

## 2023-07-24 NOTE — ASSESSMENT & PLAN NOTE
Symptoms stable no change in treatment overall avoid late night eating and continue with pantoprazole

## 2023-07-24 NOTE — PROGRESS NOTES
[address] [facility] coding opportunities       Chart reviewed, no opportunity found: CHART REVIEWED, [facility]     Patients Insurance     Medicare Insurance: [employer]

## 2023-07-24 NOTE — PROGRESS NOTES
Assessment/Plan:       Problem List Items Addressed This Visit        Digestive    GERD without esophagitis     Symptoms stable no change in treatment overall avoid late night eating and continue with pantoprazole            Endocrine    Type 2 diabetes mellitus with diabetic polyneuropathy ([address])     Diabetes stable A1c is at 6.1 now continuing with diet plan Januvia and metformin are working well lab work shows good reports and she will follow-up with me in 4 months  Lab Results   Component Value Date    HGBA1C 6.1 (H) 07/05/2023            Relevant Medications    metFORMIN (GLUCOPHAGE-XR) 500 mg 24 hr tablet    Other Relevant Orders    CBC and differential    Comprehensive metabolic panel    Lipid panel    TSH, 3rd generation with Free T4 reflex       Cardiovascular and Mediastinum    Essential hypertension     Pretension stable control doing well overall with current hydrochlorothiazide combination and reduction of sodium intake follow-up with me at next visit in 4 months         Relevant Medications    potassium chloride (Klor-Con) 10 mEq tablet    Other Relevant Orders    Comprehensive metabolic panel    Lipid panel    TSH, 3rd generation with Free T4 reflex       Musculoskeletal and Integument    Closed fracture of coccyx (HCC) - Primary     Overall less pain in the sacrum doing better since her fall in May she is healing has some discomfort but no worsening symptoms she will continue her current plan of treatment            Other    Anxiety and depression     Generalized anxiety depression continue with same medication regimen         Relevant Medications    clonazePAM (KlonoPIN) 1 mg tablet    Urinary incontinence     Continue with Kegel exercises         Relevant Medications    solifenacin (VESICARE) 5 mg tablet    nitrofurantoin (MACROBID) 100 mg capsule   Other Visit Diagnoses     Uncontrolled type 2 diabetes mellitus with hyperglycemia (HCC)        Relevant Medications    metFORMIN (GLUCOPHAGE-XR) 500 mg 24 hr tablet    nitrofurantoin (MACROBID) 100 mg capsule    Other Relevant Orders    Albumin / creatinine urine ratio    Comprehensive metabolic panel    Hemoglobin A1C    Lipid Panel with Direct LDL reflex    TSH, 3rd generation with Free T4 reflex    CBC and differential    Comprehensive metabolic panel    Lipid panel    TSH, 3rd generation with Free T4 reflex            Subjective:      Patient ID: Gerson Valencia is a 69 y.o. female. Follow-up evaluation and review of lab work doing well overall      The following portions of the patient's history were reviewed and updated as appropriate: allergies, current medications, past family history, past medical history, past social history, past surgical history and problem list.    Review of Systems   Constitutional: Negative for chills, fatigue and fever. HENT: Negative for congestion, nosebleeds, rhinorrhea, sinus pressure and sore throat. Eyes: Negative for discharge and redness. Respiratory: Negative for cough and shortness of breath. Cardiovascular: Negative for chest pain, palpitations and leg swelling. Gastrointestinal: Negative for abdominal pain, blood in stool and nausea. Endocrine: Negative for cold intolerance, heat intolerance and polyuria. Genitourinary: Negative for dysuria and frequency. Musculoskeletal: Negative for arthralgias, back pain and myalgias. Skin: Negative for rash. Neurological: Negative for dizziness, weakness and headaches. Hematological: Negative for adenopathy. Psychiatric/Behavioral: Negative for behavioral problems and sleep disturbance. The patient is not nervous/anxious. Objective:      /80 (BP Location: Left arm, Patient Position: Sitting, Cuff Size: Standard)   Pulse 80   Temp 98.2 °F (36.8 °C) (Tympanic)   Resp 18   Ht 5' 1" (1.549 m)   Wt 73.7 kg (162 lb 6.4 oz)   SpO2 95%   BMI 30.69 kg/m²        Physical Exam  Vitals and nursing note reviewed.    Constitutional:       General: She is not in acute distress. Appearance: Normal appearance. She is well-developed. HENT:      Head: Normocephalic and atraumatic. Right Ear: Tympanic membrane and external ear normal.      Left Ear: Tympanic membrane and external ear normal.      Nose: Nose normal.      Mouth/Throat:      Mouth: Mucous membranes are moist.      Pharynx: No oropharyngeal exudate. Eyes:      General: No scleral icterus. Right eye: No discharge. Left eye: No discharge. Conjunctiva/sclera: Conjunctivae normal.      Pupils: Pupils are equal, round, and reactive to light. Neck:      Thyroid: No thyromegaly. Vascular: No JVD. Cardiovascular:      Rate and Rhythm: Normal rate and regular rhythm. Pulses: Normal pulses. Heart sounds: Normal heart sounds. No murmur heard. Pulmonary:      Effort: Pulmonary effort is normal.      Breath sounds: Normal breath sounds. No wheezing or rales. Chest:      Chest wall: No tenderness. Abdominal:      General: Bowel sounds are normal. There is no distension. Palpations: Abdomen is soft. There is no mass. Tenderness: There is no abdominal tenderness. Musculoskeletal:         General: Tenderness present. No deformity. Normal range of motion. Cervical back: Normal range of motion. Lymphadenopathy:      Cervical: No cervical adenopathy. Skin:     General: Skin is warm and dry. Findings: No rash. Neurological:      General: No focal deficit present. Mental Status: She is alert and oriented to person, place, and time. Cranial Nerves: No cranial nerve deficit. Coordination: Coordination normal.      Deep Tendon Reflexes: Reflexes are normal and symmetric. Reflexes normal.   Psychiatric:         Mood and Affect: Mood normal.         Behavior: Behavior normal.         Thought Content:  Thought content normal.         Judgment: Judgment normal.          Data:    Laboratory Results: I have personally reviewed the pertinent laboratory results/reports   Radiology/Other Diagnostic Testing Results: I have personally reviewed pertinent reports.        Lab Results   Component Value Date    WBC 11.31 (H) 07/05/2023    HGB 15.4 07/05/2023    HCT 46.9 (H) 07/05/2023    MCV 96 07/05/2023     07/05/2023     Lab Results   Component Value Date     05/21/2018    K 4.0 07/05/2023    CL 98 07/05/2023    CO2 30 07/05/2023    ANIONGAP 14.0 05/21/2018    BUN 11 07/05/2023    CREATININE 0.58 (L) 07/05/2023    GLUF 86 07/05/2023    CALCIUM 10.4 (H) 07/05/2023    AST 19 07/05/2023    ALT 22 07/05/2023    ALKPHOS 65 07/05/2023    PROT 6.4 05/21/2018    BILITOT 0.4 05/21/2018    EGFR 89 07/05/2023     Lab Results   Component Value Date    CHOLESTEROL 138 07/05/2023    CHOLESTEROL 172 12/06/2022    CHOLESTEROL 144 09/15/2022     Lab Results   Component Value Date    HDL 40 (L) 07/05/2023    HDL 48 (L) 12/06/2022    HDL 40 (L) 09/15/2022     Lab Results   Component Value Date    LDLCALC 48 07/05/2023    LDLCALC 84 12/06/2022    LDLCALC 71 09/15/2022     Lab Results   Component Value Date    TRIG 248 (H) 07/05/2023    TRIG 201 (H) 12/06/2022    TRIG 163 (H) 09/15/2022     No results found for: "[place], [place]"  Lab Results   Component Value Date    QKV0QKRTRYRR 2.892 07/05/2023     Lab Results   Component Value Date    HGBA1C 6.1 (H) 07/05/2023     No results found for: "PSA"    Josias Wright DO

## 2023-07-24 NOTE — PROGRESS NOTES
Diabetic Foot Exam    Patient's shoes and socks removed. Right Foot/Ankle   Right Foot Inspection  Skin Exam: skin normal and skin intact. No dry skin, no warmth, no callus, no erythema, no maceration, no abnormal color, no pre-ulcer, no ulcer and no callus. Toe Exam: ROM and strength within normal limits. Sensory   Monofilament testing: intact    Vascular  Capillary refills: < 3 seconds  The right DP pulse is 2+. The right PT pulse is 2+. Left Foot/Ankle  Left Foot Inspection  Skin Exam: skin normal and skin intact. No dry skin, no warmth, no erythema, no maceration, normal color, no pre-ulcer, no ulcer and no callus. Toe Exam: ROM and strength within normal limits. Sensory   Monofilament testing: intact    Vascular  Capillary refills: < 3 seconds  The left DP pulse is 2+. The left PT pulse is 2+.      Assign Risk Category  No deformity present  No loss of protective sensation  No weak pulses  Risk: 0

## 2023-07-24 NOTE — ASSESSMENT & PLAN NOTE
Diabetes stable A1c is at 6.1 now continuing with diet plan Januvia and metformin are working well lab work shows good reports and she will follow-up with me in 4 months  Lab Results   Component Value Date    HGBA1C 6.1 (H) 07/05/2023

## 2023-08-08 ENCOUNTER — TELEPHONE (OUTPATIENT)
Dept: FAMILY MEDICINE CLINIC | Facility: CLINIC | Age: 78
End: 2023-08-08

## 2023-08-08 DIAGNOSIS — R32 URINARY INCONTINENCE, UNSPECIFIED TYPE: Primary | ICD-10-CM

## 2023-08-08 NOTE — TELEPHONE ENCOUNTER
Patient does not believe the Macrobid medication is working. Patient is not in any pain but still rust colored urine. Patient would like a urine analysis done.

## 2023-08-11 ENCOUNTER — HOSPITAL ENCOUNTER (INPATIENT)
Facility: HOSPITAL | Age: 78
LOS: 3 days | Discharge: HOME | DRG: 190 | End: 2023-08-14
Attending: EMERGENCY MEDICINE | Admitting: HOSPITALIST
Payer: COMMERCIAL

## 2023-08-11 ENCOUNTER — APPOINTMENT (INPATIENT)
Dept: CT IMAGING | Facility: HOSPITAL | Age: 78
DRG: 190 | End: 2023-08-11
Payer: COMMERCIAL

## 2023-08-11 ENCOUNTER — APPOINTMENT (EMERGENCY)
Dept: RADIOLOGY | Facility: HOSPITAL | Age: 78
DRG: 190 | End: 2023-08-11
Payer: COMMERCIAL

## 2023-08-11 DIAGNOSIS — J44.9 COPD (CHRONIC OBSTRUCTIVE PULMONARY DISEASE) (HCC): ICD-10-CM

## 2023-08-11 DIAGNOSIS — I10 ESSENTIAL HYPERTENSION: ICD-10-CM

## 2023-08-11 DIAGNOSIS — R09.02 HYPOXIA: Primary | ICD-10-CM

## 2023-08-11 DIAGNOSIS — R77.8 ELEVATED TROPONIN: ICD-10-CM

## 2023-08-11 DIAGNOSIS — R31.9 HEMATURIA: ICD-10-CM

## 2023-08-11 DIAGNOSIS — R91.8 MULTIPLE LUNG NODULES ON CT: Primary | ICD-10-CM

## 2023-08-11 DIAGNOSIS — Z00.00 MEDICARE ANNUAL WELLNESS VISIT, SUBSEQUENT: ICD-10-CM

## 2023-08-11 PROBLEM — E11.42 TYPE 2 DIABETES MELLITUS WITH DIABETIC POLYNEUROPATHY (HCC): Chronic | Status: ACTIVE | Noted: 2019-02-12

## 2023-08-11 PROBLEM — J20.8 ACUTE BACTERIAL BRONCHITIS: Status: RESOLVED | Noted: 2020-01-30 | Resolved: 2023-08-11

## 2023-08-11 PROBLEM — F41.9 ANXIETY AND DEPRESSION: Chronic | Status: ACTIVE | Noted: 2019-02-15

## 2023-08-11 PROBLEM — E87.20 LACTIC ACIDOSIS: Status: ACTIVE | Noted: 2023-08-11
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
## 2023-08-11 PROBLEM — E87.6 HYPOKALEMIA: Status: ACTIVE | Noted: 2023-08-11

## 2023-08-11 PROBLEM — K21.9 GERD WITHOUT ESOPHAGITIS: Chronic | Status: ACTIVE | Noted: 2019-10-16

## 2023-08-11 PROBLEM — R07.89 CHEST WALL PAIN: Status: RESOLVED | Noted: 2020-02-17 | Resolved: 2023-08-11

## 2023-08-11 PROBLEM — S32.2XXA CLOSED FRACTURE OF COCCYX (HCC): Status: RESOLVED | Noted: 2023-05-24 | Resolved: 2023-08-11

## 2023-08-11 PROBLEM — E83.42 HYPOMAGNESEMIA: Status: ACTIVE | Noted: 2023-08-11

## 2023-08-11 PROBLEM — R79.89 ELEVATED TROPONIN: Status: ACTIVE | Noted: 2023-08-11

## 2023-08-11 PROBLEM — B96.89 ACUTE BACTERIAL BRONCHITIS: Status: RESOLVED | Noted: 2020-01-30 | Resolved: 2023-08-11
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
## 2023-08-11 PROBLEM — R05.9 COUGH: Status: RESOLVED | Noted: 2020-02-17 | Resolved: 2023-08-11

## 2023-08-11 PROBLEM — F32.A ANXIETY AND DEPRESSION: Chronic | Status: ACTIVE | Noted: 2019-02-15

## 2023-08-11 LAB
2HR DELTA HS TROPONIN: 74 NG/L
4HR DELTA HS TROPONIN: 96 NG/L
ALBUMIN SERPL BCP-MCNC: 3.9 G/DL (ref 3.5–5)
ALP SERPL-CCNC: 98 U/L (ref 34–104)
ALT SERPL W P-5'-P-CCNC: 21 U/L (ref 7–52)
ANION GAP SERPL CALCULATED.3IONS-SCNC: 11 MMOL/L
ANION GAP SERPL CALCULATED.3IONS-SCNC: 9 MMOL/L
APTT PPP: 28 SECONDS (ref 23–37)
ARTERIAL PATENCY WRIST A: YES
AST SERPL W P-5'-P-CCNC: 62 U/L (ref 13–39)
ATRIAL RATE: 104 BPM
ATRIAL RATE: 105 BPM
ATRIAL RATE: 90 BPM
BACTERIA UR QL AUTO: ABNORMAL /HPF
BASE EXCESS BLDA CALC-SCNC: 5.2 MMOL/L
BASOPHILS # BLD MANUAL: 0 THOUSAND/UL (ref 0–0.1)
BASOPHILS NFR MAR MANUAL: 0 % (ref 0–1)
BILIRUB SERPL-MCNC: 0.84 MG/DL (ref 0.2–1)
BILIRUB UR QL STRIP: NEGATIVE
BUN SERPL-MCNC: 12 MG/DL (ref 5–25)
BUN SERPL-MCNC: 12 MG/DL (ref 5–25)
CALCIUM SERPL-MCNC: 9.1 MG/DL (ref 8.4–10.2)
CALCIUM SERPL-MCNC: 9.8 MG/DL (ref 8.4–10.2)
CARDIAC TROPONIN I PNL SERPL HS: 107 NG/L
CARDIAC TROPONIN I PNL SERPL HS: 129 NG/L
CARDIAC TROPONIN I PNL SERPL HS: 33 NG/L
CHLORIDE SERPL-SCNC: 90 MMOL/L (ref 96–108)
CHLORIDE SERPL-SCNC: 92 MMOL/L (ref 96–108)
CLARITY UR: CLEAR
CO2 SERPL-SCNC: 31 MMOL/L (ref 21–32)
CO2 SERPL-SCNC: 32 MMOL/L (ref 21–32)
COLOR UR: ABNORMAL
CREAT SERPL-MCNC: 0.52 MG/DL (ref 0.6–1.3)
CREAT SERPL-MCNC: 0.59 MG/DL (ref 0.6–1.3)
EOSINOPHIL # BLD MANUAL: 0.17 THOUSAND/UL (ref 0–0.4)
EOSINOPHIL NFR BLD MANUAL: 2 % (ref 0–6)
ERYTHROCYTE [DISTWIDTH] IN BLOOD BY AUTOMATED COUNT: 12.5 % (ref 11.6–15.1)
FLUAV RNA RESP QL NAA+PROBE: NEGATIVE
FLUBV RNA RESP QL NAA+PROBE: NEGATIVE
GFR SERPL CREATININE-BSD FRML MDRD: 88 ML/MIN/1.73SQ M
GFR SERPL CREATININE-BSD FRML MDRD: 92 ML/MIN/1.73SQ M
GLUCOSE SERPL-MCNC: 135 MG/DL (ref 65–140)
GLUCOSE SERPL-MCNC: 163 MG/DL (ref 65–140)
GLUCOSE SERPL-MCNC: 169 MG/DL (ref 65–140)
GLUCOSE SERPL-MCNC: 177 MG/DL (ref 65–140)
GLUCOSE SERPL-MCNC: 195 MG/DL (ref 65–140)
GLUCOSE SERPL-MCNC: 262 MG/DL (ref 65–140)
GLUCOSE UR STRIP-MCNC: NEGATIVE MG/DL
HCO3 BLDA-SCNC: 30.6 MMOL/L (ref 22–28)
HCT VFR BLD AUTO: 45.3 % (ref 34.8–46.1)
HGB BLD-MCNC: 15.5 G/DL (ref 11.5–15.4)
HGB UR QL STRIP.AUTO: ABNORMAL
INR PPP: 1.08 (ref 0.84–1.19)
KETONES UR STRIP-MCNC: ABNORMAL MG/DL
LACTATE SERPL-SCNC: 1.7 MMOL/L (ref 0.5–2)
LACTATE SERPL-SCNC: 2.5 MMOL/L (ref 0.5–2)
LEUKOCYTE ESTERASE UR QL STRIP: NEGATIVE
LYMPHOCYTES # BLD AUTO: 0.17 THOUSAND/UL (ref 0.6–4.47)
LYMPHOCYTES # BLD AUTO: 2 % (ref 14–44)
MAGNESIUM SERPL-MCNC: 1 MG/DL (ref 1.9–2.7)
MAGNESIUM SERPL-MCNC: 2.1 MG/DL (ref 1.9–2.7)
MCH RBC QN AUTO: 31.8 PG (ref 26.8–34.3)
MCHC RBC AUTO-ENTMCNC: 34.2 G/DL (ref 31.4–37.4)
MCV RBC AUTO: 93 FL (ref 82–98)
MONOCYTES # BLD AUTO: 0.09 THOUSAND/UL (ref 0–1.22)
MONOCYTES NFR BLD: 1 % (ref 4–12)
MUCOUS THREADS UR QL AUTO: ABNORMAL
NASAL CANNULA: 3.5
NEUTROPHILS # BLD MANUAL: 8.3 THOUSAND/UL (ref 1.85–7.62)
NEUTS BAND NFR BLD MANUAL: 9 % (ref 0–8)
NEUTS SEG NFR BLD AUTO: 86 % (ref 43–75)
NITRITE UR QL STRIP: NEGATIVE
NON-SQ EPI CELLS URNS QL MICRO: ABNORMAL /HPF
O2 CT BLDA-SCNC: 19 ML/DL (ref 16–23)
OXYHGB MFR BLDA: 90.1 % (ref 94–97)
P AXIS: 40 DEGREES
PCO2 BLDA: 47.4 MM HG (ref 36–44)
PH BLDA: 7.43 [PH] (ref 7.35–7.45)
PH UR STRIP.AUTO: 5.5 [PH]
PLATELET # BLD AUTO: 172 THOUSANDS/UL (ref 149–390)
PLATELET BLD QL SMEAR: ADEQUATE
PMV BLD AUTO: 9.5 FL (ref 8.9–12.7)
PO2 BLDA: 67.9 MM HG (ref 75–129)
POTASSIUM SERPL-SCNC: 2.6 MMOL/L (ref 3.5–5.3)
POTASSIUM SERPL-SCNC: 3.4 MMOL/L (ref 3.5–5.3)
PROCALCITONIN SERPL-MCNC: 1.92 NG/ML
PROT SERPL-MCNC: 6.6 G/DL (ref 6.4–8.4)
PROT UR STRIP-MCNC: ABNORMAL MG/DL
PROTHROMBIN TIME: 14.1 SECONDS (ref 11.6–14.5)
QRS AXIS: -27 DEGREES
QRS AXIS: -33 DEGREES
QRS AXIS: 95 DEGREES
QRSD INTERVAL: 102 MS
QRSD INTERVAL: 108 MS
QRSD INTERVAL: 112 MS
QT INTERVAL: 338 MS
QT INTERVAL: 358 MS
QT INTERVAL: 362 MS
QTC INTERVAL: 438 MS
QTC INTERVAL: 445 MS
QTC INTERVAL: 478 MS
RBC # BLD AUTO: 4.88 MILLION/UL (ref 3.81–5.12)
RBC #/AREA URNS AUTO: ABNORMAL /HPF
RBC MORPH BLD: NORMAL
RSV RNA RESP QL NAA+PROBE: NEGATIVE
SARS-COV-2 RNA RESP QL NAA+PROBE: NEGATIVE
SODIUM SERPL-SCNC: 130 MMOL/L (ref 135–147)
SODIUM SERPL-SCNC: 135 MMOL/L (ref 135–147)
SP GR UR STRIP.AUTO: 1.02
SPECIMEN SOURCE: ABNORMAL
T WAVE AXIS: -81 DEGREES
T WAVE AXIS: 82 DEGREES
T WAVE AXIS: 87 DEGREES
UROBILINOGEN UR QL STRIP.AUTO: 0.2 E.U./DL
VENTRICULAR RATE: 101 BPM
VENTRICULAR RATE: 105 BPM
VENTRICULAR RATE: 93 BPM
WBC # BLD AUTO: 8.74 THOUSAND/UL (ref 4.31–10.16)
WBC #/AREA URNS AUTO: ABNORMAL /HPF

## 2023-08-11 PROCEDURE — 96366 THER/PROPH/DIAG IV INF ADDON: CPT

## 2023-08-11 PROCEDURE — 85027 COMPLETE CBC AUTOMATED: CPT | Performed by: EMERGENCY MEDICINE

## 2023-08-11 PROCEDURE — 83735 ASSAY OF MAGNESIUM: CPT | Performed by: PHYSICIAN ASSISTANT

## 2023-08-11 PROCEDURE — 85007 BL SMEAR W/DIFF WBC COUNT: CPT | Performed by: EMERGENCY MEDICINE

## 2023-08-11 PROCEDURE — 36600 WITHDRAWAL OF ARTERIAL BLOOD: CPT

## 2023-08-11 PROCEDURE — 94760 N-INVAS EAR/PLS OXIMETRY 1: CPT

## 2023-08-11 PROCEDURE — 0241U HB NFCT DS VIR RESP RNA 4 TRGT: CPT | Performed by: EMERGENCY MEDICINE

## 2023-08-11 PROCEDURE — 80048 BASIC METABOLIC PNL TOTAL CA: CPT | Performed by: PHYSICIAN ASSISTANT

## 2023-08-11 PROCEDURE — 85610 PROTHROMBIN TIME: CPT | Performed by: EMERGENCY MEDICINE

## 2023-08-11 PROCEDURE — 81003 URINALYSIS AUTO W/O SCOPE: CPT | Performed by: EMERGENCY MEDICINE

## 2023-08-11 PROCEDURE — 99223 1ST HOSP IP/OBS HIGH 75: CPT | Performed by: HOSPITALIST

## 2023-08-11 PROCEDURE — 74178 CT ABD&PLV WO CNTR FLWD CNTR: CPT

## 2023-08-11 PROCEDURE — 87040 BLOOD CULTURE FOR BACTERIA: CPT | Performed by: EMERGENCY MEDICINE

## 2023-08-11 PROCEDURE — G1004 CDSM NDSC: HCPCS

## 2023-08-11 PROCEDURE — 88112 CYTOPATH CELL ENHANCE TECH: CPT | Performed by: STUDENT IN AN ORGANIZED HEALTH CARE EDUCATION/TRAINING PROGRAM

## 2023-08-11 PROCEDURE — 36415 COLL VENOUS BLD VENIPUNCTURE: CPT | Performed by: EMERGENCY MEDICINE

## 2023-08-11 PROCEDURE — 96365 THER/PROPH/DIAG IV INF INIT: CPT

## 2023-08-11 PROCEDURE — 84484 ASSAY OF TROPONIN QUANT: CPT | Performed by: EMERGENCY MEDICINE

## 2023-08-11 PROCEDURE — 71045 X-RAY EXAM CHEST 1 VIEW: CPT

## 2023-08-11 PROCEDURE — 94664 DEMO&/EVAL PT USE INHALER: CPT

## 2023-08-11 PROCEDURE — 93005 ELECTROCARDIOGRAM TRACING: CPT

## 2023-08-11 PROCEDURE — 85730 THROMBOPLASTIN TIME PARTIAL: CPT | Performed by: EMERGENCY MEDICINE

## 2023-08-11 PROCEDURE — 99284 EMERGENCY DEPT VISIT MOD MDM: CPT

## 2023-08-11 PROCEDURE — 83605 ASSAY OF LACTIC ACID: CPT | Performed by: EMERGENCY MEDICINE

## 2023-08-11 PROCEDURE — 71250 CT THORAX DX C-: CPT

## 2023-08-11 PROCEDURE — 99223 1ST HOSP IP/OBS HIGH 75: CPT | Performed by: INTERNAL MEDICINE

## 2023-08-11 PROCEDURE — NC001 PR NO CHARGE: Performed by: EMERGENCY MEDICINE

## 2023-08-11 PROCEDURE — 99285 EMERGENCY DEPT VISIT HI MDM: CPT | Performed by: EMERGENCY MEDICINE

## 2023-08-11 PROCEDURE — 93010 ELECTROCARDIOGRAM REPORT: CPT | Performed by: INTERNAL MEDICINE

## 2023-08-11 PROCEDURE — 82805 BLOOD GASES W/O2 SATURATION: CPT | Performed by: EMERGENCY MEDICINE

## 2023-08-11 PROCEDURE — 80053 COMPREHEN METABOLIC PANEL: CPT | Performed by: EMERGENCY MEDICINE

## 2023-08-11 PROCEDURE — 82948 REAGENT STRIP/BLOOD GLUCOSE: CPT

## 2023-08-11 PROCEDURE — 84145 PROCALCITONIN (PCT): CPT | Performed by: EMERGENCY MEDICINE

## 2023-08-11 PROCEDURE — 81001 URINALYSIS AUTO W/SCOPE: CPT | Performed by: EMERGENCY MEDICINE

## 2023-08-11 RX ORDER — BUPROPION HYDROCHLORIDE 150 MG/1
300 TABLET ORAL DAILY
Status: DISCONTINUED | OUTPATIENT
Start: 2023-08-11 | End: 2023-08-14 | Stop reason: HOSPADM

## 2023-08-11 RX ORDER — MAGNESIUM SULFATE HEPTAHYDRATE 40 MG/ML
4 INJECTION, SOLUTION INTRAVENOUS ONCE
Status: COMPLETED | OUTPATIENT
Start: 2023-08-11 | End: 2023-08-11

## 2023-08-11 RX ORDER — ASPIRIN 81 MG/1
324 TABLET, CHEWABLE ORAL ONCE
Status: COMPLETED | OUTPATIENT
Start: 2023-08-11 | End: 2023-08-11

## 2023-08-11 RX ORDER — ONDANSETRON 2 MG/ML
4 INJECTION INTRAMUSCULAR; INTRAVENOUS EVERY 6 HOURS PRN
Status: DISCONTINUED | OUTPATIENT
Start: 2023-08-11 | End: 2023-08-14 | Stop reason: HOSPADM

## 2023-08-11 RX ORDER — CHLORAL HYDRATE 500 MG
1000 CAPSULE ORAL DAILY
Status: DISCONTINUED | OUTPATIENT
Start: 2023-08-11 | End: 2023-08-14 | Stop reason: HOSPADM

## 2023-08-11 RX ORDER — ACETAMINOPHEN 325 MG/1
650 TABLET ORAL EVERY 4 HOURS PRN
Status: DISCONTINUED | OUTPATIENT
Start: 2023-08-11 | End: 2023-08-14 | Stop reason: HOSPADM

## 2023-08-11 RX ORDER — INSULIN LISPRO 100 [IU]/ML
1-5 INJECTION, SOLUTION INTRAVENOUS; SUBCUTANEOUS
Status: DISCONTINUED | OUTPATIENT
Start: 2023-08-11 | End: 2023-08-14 | Stop reason: HOSPADM

## 2023-08-11 RX ORDER — PREGABALIN 100 MG/1
200 CAPSULE ORAL 3 TIMES DAILY
Status: DISCONTINUED | OUTPATIENT
Start: 2023-08-11 | End: 2023-08-14 | Stop reason: HOSPADM

## 2023-08-11 RX ORDER — NICOTINE 21 MG/24HR
1 PATCH, TRANSDERMAL 24 HOURS TRANSDERMAL DAILY
Status: DISCONTINUED | OUTPATIENT
Start: 2023-08-11 | End: 2023-08-14 | Stop reason: HOSPADM

## 2023-08-11 RX ORDER — ASPIRIN 81 MG/1
81 TABLET, CHEWABLE ORAL DAILY
Status: DISCONTINUED | OUTPATIENT
Start: 2023-08-11 | End: 2023-08-14 | Stop reason: HOSPADM

## 2023-08-11 RX ORDER — POTASSIUM CHLORIDE 14.9 MG/ML
20 INJECTION INTRAVENOUS ONCE
Status: COMPLETED | OUTPATIENT
Start: 2023-08-11 | End: 2023-08-11

## 2023-08-11 RX ORDER — PANTOPRAZOLE SODIUM 40 MG/1
40 TABLET, DELAYED RELEASE ORAL DAILY
Status: DISCONTINUED | OUTPATIENT
Start: 2023-08-11 | End: 2023-08-14 | Stop reason: HOSPADM

## 2023-08-11 RX ORDER — CEFTRIAXONE 2 G/50ML
2000 INJECTION, SOLUTION INTRAVENOUS EVERY 24 HOURS
Status: DISCONTINUED | OUTPATIENT
Start: 2023-08-11 | End: 2023-08-14 | Stop reason: HOSPADM

## 2023-08-11 RX ORDER — ENOXAPARIN SODIUM 100 MG/ML
40 INJECTION SUBCUTANEOUS DAILY
Status: DISCONTINUED | OUTPATIENT
Start: 2023-08-11 | End: 2023-08-14 | Stop reason: HOSPADM

## 2023-08-11 RX ORDER — IPRATROPIUM BROMIDE AND ALBUTEROL SULFATE 2.5; .5 MG/3ML; MG/3ML
3 SOLUTION RESPIRATORY (INHALATION) ONCE
Status: COMPLETED | OUTPATIENT
Start: 2023-08-11 | End: 2023-08-11

## 2023-08-11 RX ORDER — POTASSIUM CHLORIDE 750 MG/1
20 TABLET, EXTENDED RELEASE ORAL
Status: DISCONTINUED | OUTPATIENT
Start: 2023-08-11 | End: 2023-08-14 | Stop reason: HOSPADM

## 2023-08-11 RX ORDER — PREDNISONE 20 MG/1
40 TABLET ORAL DAILY
Status: DISCONTINUED | OUTPATIENT
Start: 2023-08-11 | End: 2023-08-14 | Stop reason: HOSPADM

## 2023-08-11 RX ORDER — SUCRALFATE 1 G/1
1 TABLET ORAL 4 TIMES DAILY
Status: DISCONTINUED | OUTPATIENT
Start: 2023-08-11 | End: 2023-08-14 | Stop reason: HOSPADM

## 2023-08-11 RX ORDER — ALBUTEROL SULFATE 2.5 MG/3ML
2.5 SOLUTION RESPIRATORY (INHALATION) EVERY 4 HOURS PRN
Status: DISCONTINUED | OUTPATIENT
Start: 2023-08-11 | End: 2023-08-14 | Stop reason: HOSPADM

## 2023-08-11 RX ORDER — POTASSIUM CHLORIDE 20 MEQ/1
40 TABLET, EXTENDED RELEASE ORAL ONCE
Status: COMPLETED | OUTPATIENT
Start: 2023-08-11 | End: 2023-08-11

## 2023-08-11 RX ORDER — IPRATROPIUM BROMIDE AND ALBUTEROL SULFATE 2.5; .5 MG/3ML; MG/3ML
3 SOLUTION RESPIRATORY (INHALATION)
Status: DISCONTINUED | OUTPATIENT
Start: 2023-08-11 | End: 2023-08-11

## 2023-08-11 RX ORDER — CLONAZEPAM 1 MG/1
1 TABLET ORAL
Status: DISCONTINUED | OUTPATIENT
Start: 2023-08-11 | End: 2023-08-14 | Stop reason: HOSPADM

## 2023-08-11 RX ADMIN — INSULIN LISPRO 1 UNITS: 100 INJECTION, SOLUTION INTRAVENOUS; SUBCUTANEOUS at 21:55

## 2023-08-11 RX ADMIN — POTASSIUM CHLORIDE 20 MEQ: 750 TABLET, EXTENDED RELEASE ORAL at 07:52

## 2023-08-11 RX ADMIN — INSULIN LISPRO 2 UNITS: 100 INJECTION, SOLUTION INTRAVENOUS; SUBCUTANEOUS at 16:22

## 2023-08-11 RX ADMIN — INSULIN LISPRO 1 UNITS: 100 INJECTION, SOLUTION INTRAVENOUS; SUBCUTANEOUS at 09:36

## 2023-08-11 RX ADMIN — CEFTRIAXONE 2000 MG: 2 INJECTION, SOLUTION INTRAVENOUS at 04:48

## 2023-08-11 RX ADMIN — SUCRALFATE 1 G: 1 TABLET ORAL at 21:56

## 2023-08-11 RX ADMIN — MAGNESIUM SULFATE HEPTAHYDRATE 4 G: 40 INJECTION, SOLUTION INTRAVENOUS at 06:46

## 2023-08-11 RX ADMIN — POTASSIUM CHLORIDE 40 MEQ: 1500 TABLET, EXTENDED RELEASE ORAL at 06:07

## 2023-08-11 RX ADMIN — POTASSIUM CHLORIDE 20 MEQ: 14.9 INJECTION, SOLUTION INTRAVENOUS at 01:50

## 2023-08-11 RX ADMIN — ASPIRIN 81 MG CHEWABLE TABLET 81 MG: 81 TABLET CHEWABLE at 09:36

## 2023-08-11 RX ADMIN — PREGABALIN 200 MG: 100 CAPSULE ORAL at 21:56

## 2023-08-11 RX ADMIN — PREGABALIN 200 MG: 100 CAPSULE ORAL at 09:36

## 2023-08-11 RX ADMIN — IPRATROPIUM BROMIDE AND ALBUTEROL SULFATE 3 ML: .5; 3 SOLUTION RESPIRATORY (INHALATION) at 02:39

## 2023-08-11 RX ADMIN — SUCRALFATE 1 G: 1 TABLET ORAL at 12:03

## 2023-08-11 RX ADMIN — ENOXAPARIN SODIUM 40 MG: 40 INJECTION SUBCUTANEOUS at 09:36

## 2023-08-11 RX ADMIN — PREGABALIN 200 MG: 100 CAPSULE ORAL at 16:22

## 2023-08-11 RX ADMIN — CLONAZEPAM 1 MG: 1 TABLET ORAL at 21:56

## 2023-08-11 RX ADMIN — OMEGA-3 FATTY ACIDS CAP 1000 MG 1000 MG: 1000 CAP at 09:36

## 2023-08-11 RX ADMIN — PANTOPRAZOLE SODIUM 40 MG: 40 TABLET, DELAYED RELEASE ORAL at 06:30

## 2023-08-11 RX ADMIN — PREDNISONE 40 MG: 20 TABLET ORAL at 09:36

## 2023-08-11 RX ADMIN — ASPIRIN 81 MG 324 MG: 81 TABLET ORAL at 04:24

## 2023-08-11 RX ADMIN — SUCRALFATE 1 G: 1 TABLET ORAL at 09:36

## 2023-08-11 RX ADMIN — SODIUM CHLORIDE 1000 ML: 0.9 INJECTION, SOLUTION INTRAVENOUS at 01:52

## 2023-08-11 RX ADMIN — POTASSIUM CHLORIDE 20 MEQ: 14.9 INJECTION, SOLUTION INTRAVENOUS at 06:07

## 2023-08-11 RX ADMIN — IOHEXOL 100 ML: 350 INJECTION, SOLUTION INTRAVENOUS at 05:04

## 2023-08-11 RX ADMIN — INSULIN LISPRO 1 UNITS: 100 INJECTION, SOLUTION INTRAVENOUS; SUBCUTANEOUS at 12:04

## 2023-08-11 RX ADMIN — SUCRALFATE 1 G: 1 TABLET ORAL at 17:30

## 2023-08-11 RX ADMIN — BUPROPION HYDROCHLORIDE 300 MG: 150 TABLET, EXTENDED RELEASE ORAL at 09:36

## 2023-08-11 NOTE — PLAN OF CARE
Problem: PAIN - ADULT  Goal: Verbalizes/displays adequate comfort level or baseline comfort level  Description: Interventions:  - Encourage patient to monitor pain and request assistance  - Assess pain using appropriate pain scale  - Administer analgesics based on type and severity of pain and evaluate response  - Implement non-pharmacological measures as appropriate and evaluate response  - Consider cultural and social influences on pain and pain management  - Notify physician/advanced practitioner if interventions unsuccessful or patient reports new pain  Outcome: Progressing     Problem: INFECTION - ADULT  Goal: Absence or prevention of progression during hospitalization  Description: INTERVENTIONS:  - Assess and monitor for signs and symptoms of infection  - Monitor lab/diagnostic results  - Monitor all insertion sites, i.e. indwelling lines, tubes, and drains  - Monitor endotracheal if appropriate and nasal secretions for changes in amount and color  - [facility] appropriate cooling/warming therapies per order  - Administer medications as ordered  - Instruct and encourage patient and family to use good hand hygiene technique  - Identify and instruct in appropriate isolation precautions for identified infection/condition  Outcome: Progressing  Goal: Absence of fever/infection during neutropenic period  Description: INTERVENTIONS:  - Monitor WBC    Outcome: Progressing     Problem: SAFETY ADULT  Goal: Patient will remain free of falls  Description: INTERVENTIONS:  - Educate patient/family on patient safety including physical limitations  - Instruct patient to call for assistance with activity   - Consult OT/PT to assist with strengthening/mobility   - Keep Call bell within reach  - Keep bed low and locked with side rails adjusted as appropriate  - Keep care items and personal belongings within reach  - Initiate and maintain comfort rounds  - Make Fall Risk Sign visible to staff  - Offer Toileting every 2 Hours, in advance of need  - Apply yellow socks and bracelet for high fall risk patients  - Consider moving patient to room near nurses station  Outcome: Progressing  Goal: Maintain or return to baseline ADL function  Description: INTERVENTIONS:  -  Assess patient's ability to carry out ADLs; assess patient's baseline for ADL function and identify physical deficits which impact ability to perform ADLs (bathing, care of mouth/teeth, toileting, grooming, dressing, etc.)  - Assess/evaluate cause of self-care deficits   - Assess range of motion  - Assess patient's mobility; develop plan if impaired  - Assess patient's need for assistive devices and provide as appropriate  - Encourage maximum independence but intervene and supervise when necessary  - Involve family in performance of ADLs  - Assess for home care needs following discharge   - Consider OT consult to assist with ADL evaluation and planning for discharge  - Provide patient education as appropriate  Outcome: Progressing  Goal: Maintains/Returns to pre admission functional level  Description: INTERVENTIONS:  - Perform BMAT or MOVE assessment daily.   - Set and communicate daily mobility goal to care team and patient/family/caregiver. - Collaborate with rehabilitation services on mobility goals if consulted  - Perform Range of Motion 3 times a day. - Reposition patient every 2 hours.   - Dangle patient 3 times a day  - Stand patient 3 times a day  - Ambulate patient 3 times a day  - Out of bed to chair 3 times a day   - Out of bed for meals 3 times a day  - Out of bed for toileting  - Record patient progress and toleration of activity level   Outcome: Progressing     Problem: DISCHARGE PLANNING  Goal: Discharge to home or other facility with appropriate resources  Description: INTERVENTIONS:  - Identify barriers to discharge w/patient and caregiver  - Arrange for needed discharge resources and transportation as appropriate  - Identify discharge learning needs (meds, wound care, etc.)  - Arrange for interpretive services to assist at discharge as needed  - Refer to Case Management Department for coordinating discharge planning if the patient needs post-hospital services based on physician/advanced practitioner order or complex needs related to functional status, cognitive ability, or social support system  Outcome: Progressing     Problem: Knowledge Deficit  Goal: Patient/family/caregiver demonstrates understanding of disease process, treatment plan, medications, and discharge instructions  Description: Complete learning assessment and assess knowledge base.   Interventions:  - Provide teaching at level of understanding  - Provide teaching via preferred learning methods  Outcome: Progressing

## 2023-08-11 NOTE — H&P
[address]  H&P  Name: Richie Tang 69 y.o. female I MRN: [id number]  Unit/Bed#: -01 I Date of Admission: 8/11/2023   Date of Service: 8/11/2023 I Hospital Day: 0      Assessment/Plan   * Reactive airway disease with acute exacerbation  Assessment & Plan  Patient with hypoxia on 3.5 L NC (not on oxygen at home). Given Neb in ED. Patient is a smoker with 1 pack/day no formal diagnosis of COPD. Possible bronchitis  · steroids  · IV rocephin  · Pulmonary consult  · Flu/covid/rsv negative    Hematuria  Assessment & Plan  Patient noted blood in urine at home. UA 3+ blood. Family reports rust colored urine for 2 weeks  · Patient reports she completed 2 rounds of antibiotics  · Will obtain CT renal protocol    Lactic acidosis  Assessment & Plan  Patient is on metformin, has acute illness. Wbc normal  · IVF  · monitor    Hypomagnesemia  Assessment & Plan  · Replete and monitor    Hypokalemia  Assessment & Plan  · Replete and monitor    Elevated troponin  Assessment & Plan  Likely secondary to acute illness/hypoxia  · Cardiology consult  · Continue to trend    GERD without esophagitis  Assessment & Plan  · Continue PPI    Anxiety and depression  Assessment & Plan  · Continue home medication with bupropion 300 mg daily and clonazepam daily at bedtime    Type 2 diabetes mellitus with diabetic polyneuropathy ([address])  Assessment & Plan  Lab Results   Component Value Date    HGBA1C 6.1 (H) 07/05/2023       No results for input(s): "POCGLU" in the last 72 hours. Blood Sugar Average: Last 72 hrs:  · hold oral medication  · SSI coverage while in the hospital and adjust as needed  · Continue Lyrica for neuropathy    VTE Pharmacologic Prophylaxis: VTE Score: 6 High Risk (Score >/= 5) - Pharmacological DVT Prophylaxis Ordered: enoxaparin (Lovenox). Sequential Compression Devices Ordered.   Code Status: Level 3 - DNAR and DNI   Discussion with family: Updated  ( and daughter) at bedside. Anticipated Length of Stay: Patient will be admitted on an inpatient basis with an anticipated length of stay of greater than 2 midnights secondary to IV abx, specialist input. Total Time Spent on Date of Encounter in care of patient: 75 minutes This time was spent on one or more of the following: performing physical exam; counseling and coordination of care; obtaining or reviewing history; documenting in the medical record; reviewing/ordering tests, medications or procedures; communicating with other healthcare professionals and discussing with patient's family/caregivers. Chief Complaint: hematuria    History of Present Illness: Solomon Phan is a 69 y.o. female with a PMH of diabetes mellitus type 2 not on insulin, hypertension, GERD, anxiety and depression who presents with hematuria. Per daughter her mom has not been feeling well all week, had increased fatigue and sleeping. Notes chills/shivering and then when she would cover up she would start sweating. She has had rust colored urine for several weeks and finished 2 rounds of antibiotics. She notes SOB w/ humidity. Had diarrhea Tuesday and Wednesday. Daughter feels she is more confused than her baseline. Review of Systems:  Review of Systems   Constitutional: Positive for chills, fatigue and fever. HENT: Negative for rhinorrhea, sore throat and trouble swallowing. Eyes: Negative for discharge and redness. Respiratory: Positive for shortness of breath. Negative for cough and wheezing. Gastrointestinal: Positive for diarrhea. Negative for abdominal pain, nausea and vomiting. Genitourinary: Positive for hematuria. Negative for dysuria. Musculoskeletal: Negative for back pain and neck pain. Skin: Negative for rash and wound. Neurological: Positive for weakness and headaches. Negative for dizziness. Psychiatric/Behavioral: Positive for confusion. Negative for agitation.        Past Medical and Surgical History:   Past Medical History:   Diagnosis Date   • Anxiety    • Closed fracture of coccyx ([address]) 2023   • Diabetes mellitus (HCC)    • Hypertension    • IBS (irritable bowel syndrome)    • Shoulder fracture        Past Surgical History:   Procedure Laterality Date   • ANKLE FRACTURE SURGERY Right     has screw in place   •  SECTION      x2   • CHOLECYSTECTOMY     • HERNIA REPAIR      umbilicus w/ mesh       Meds/Allergies:  Prior to Admission medications    Medication Sig Start Date End Date Taking?  Authorizing Provider   acetaminophen (TYLENOL) 325 mg tablet Take 650 mg by mouth every 6 (six) hours as needed for mild pain    Historical Provider, MD   Apoaequorin (Prevagen Extra Strength) 20 MG CAPS Take 300 mL/day by mouth  Patient not taking: Reported on 2023    Historical Provider, MD   Aspirin (ASPIR-81 PO) take one by mouth daily 14   Historical Provider, MD   Blood Glucose Monitoring Suppl (Pippa Alvarez) w/Device KIT by Does not apply route 2 (two) times a day 20   Kody Arredondo,    buPROPion (WELLBUTRIN XL) 300 mg 24 hr tablet TAKE ONE TABLET BY MOUTH EVERY DAY 23   Vivek Preston DO   clonazePAM (KlonoPIN) 1 mg tablet Take 1 tablet (1 mg total) by mouth every evening 23   Kody Arredondo DO   dexlansoprazole (DEXILANT) 60 MG capsule Take 1 capsule (60 mg total) by mouth daily 10/16/19   Julianne Preston DO   gabapentin (NEURONTIN) 400 mg capsule Take 1 capsule (400 mg total) by mouth 2 (two) times a day  Patient not taking: Reported on 2023   Kody Arredondo DO   gabapentin (Neurontin) 600 MG tablet Take 1 tablet (600 mg total) by mouth every evening  Patient not taking: Reported on 2023 10/18/22   Kody Arredondo DO   glucose blood (OneTouch Verio) test strip TEST BLOOD GLUCOSE TWICE A DAY 11/15/22   Vivek Preston DO   hydrochlorothiazide (HYDRODIURIL) 50 mg tablet TAKE ONE TABLET BY MOUTH EVERY DAY 8/15/22   Vivek Preston DO   Januvia 100 MG tablet TAKE ONE TABLET BY MOUTH EVERY DAY 4/13/23   Du Lopez DO   Lancets (OneTouch Delica Plus AMAOUQ18C) MISC TEST TWICE A DAY 11/15/22   Vivek Preston DO   metFORMIN (GLUCOPHAGE-XR) 500 mg 24 hr tablet Take 2 tablets (1,000 mg total) by mouth 2 (two) times a day 7/24/23   Du Lopez DO   Misc. Devices (Carex Coccyx Cushion) MISC Use in the morning 5/24/23   ROXANE Garcia   naproxen sodium (ALEVE) 220 MG tablet Take 220 mg by mouth  Patient not taking: Reported on 7/24/2023    Historical Provider, MD   niacin 500 mg ER capsule Take 500 mg by mouth daily at bedtime  Patient not taking: Reported on 7/24/2023    Historical Provider, MD   nitrofurantoin (MACROBID) 100 mg capsule Take 1 capsule (100 mg total) by mouth 2 (two) times a day 7/24/23   Du Lopez DO   Omega-3 Fatty Acids (FISH OIL PO) take one tab/cap by mouth daily 3/24/14   Historical Provider, MD   pantoprazole (PROTONIX) 40 mg tablet Take 1 tablet (40 mg total) by mouth daily 1/23/23   Du Lopez DO   potassium chloride (Klor-Con) 10 mEq tablet TAKE TWO TABLETS BY MOUTH EVERY DAY 7/24/23   Vivek Preston DO   pregabalin (LYRICA) 200 MG capsule TAKE ONE CAPSULE BY MOUTH THREE TIMES A DAY 6/12/23   Vivek Preston DO   solifenacin (VESICARE) 5 mg tablet Take 1 tablet (5 mg total) by mouth daily 7/24/23   Vivek Preston DO   sucralfate (CARAFATE) 1 g tablet Take 1 g by mouth 4 (four) times a day    Historical Provider, MD STAPLES have reviewed home medications with patient personally. Allergies:    Allergies   Allergen Reactions   • Adhesive [Medical Tape]    • Latex    • Paroxetine    • Sulfa Antibiotics    • Sulfamethoxazole-Trimethoprim Hives       Social History:  Marital Status: /Civil Union   Occupation: retired  Patient Pre-hospital Living Situation: With spouse  Patient Pre-hospital Level of Mobility: walks with cane when out for distance  Patient Pre-hospital Diet Restrictions: none  Substance Use History:   Social History     Substance and Sexual Activity   Alcohol Use Not Currently     Social History     Tobacco Use   Smoking Status Every Day   • Packs/day: 1.00   • Years: 50.00   • Total pack years: 50.00   • Types: Cigarettes   • Start date: 1961   Smokeless Tobacco Never   Tobacco Comments    on & off      Social History     Substance and Sexual Activity   Drug Use Never       Family History:  Family History   Problem Relation Age of Onset   • COPD Mother    • Emphysema Mother    • COPD Father    • Emphysema Father        Physical Exam:     Vitals:   Blood Pressure: 143/66 (08/11/23 0300)  Pulse: 104 (08/11/23 0500)  Temperature: 100.3 °F (37.9 °C) (08/11/23 0008)  Temp Source: Oral (08/11/23 0008)  Respirations: 16 (08/11/23 0500)  Height: 5' 1" (154.9 cm) (08/11/23 0519)  Weight - Scale: 71.5 kg (157 lb 8.3 oz) (08/11/23 0519)  SpO2: 92 % (08/11/23 0500)    Physical Exam  Vitals reviewed. Constitutional:       Appearance: Normal appearance. Interventions: Nasal cannula in place. Comments: Fatigued and ill-appearing elderly  female   HENT:      Head: Normocephalic and atraumatic. Nose: Nose normal.   Eyes:      General:         Right eye: No discharge. Left eye: No discharge. Extraocular Movements: Extraocular movements intact. Conjunctiva/sclera: Conjunctivae normal.   Cardiovascular:      Rate and Rhythm: Regular rhythm. Tachycardia present. Pulmonary:      Effort: Pulmonary effort is normal. No respiratory distress. Breath sounds: Decreased breath sounds and wheezing present. Abdominal:      General: Bowel sounds are normal. There is no distension. Palpations: Abdomen is soft. Tenderness: There is no abdominal tenderness. There is no guarding. Musculoskeletal:         General: No swelling or tenderness. Normal range of motion. Cervical back: Normal range of motion. Right lower leg: No edema.       Left lower leg: No edema. Skin:     General: Skin is warm and dry. Capillary Refill: Capillary refill takes less than 2 seconds. Neurological:      General: No focal deficit present. Mental Status: She is alert. Mental status is at baseline. Motor: Weakness present. Psychiatric:         Mood and Affect: Mood normal.         Behavior: Behavior normal.            Additional Data:     Lab Results:  Results from last 7 days   Lab Units 08/11/23  0025   WBC Thousand/uL 8.74   HEMOGLOBIN g/dL 15.5*   HEMATOCRIT % 45.3   PLATELETS Thousands/uL 172   BANDS PCT % 9*   LYMPHO PCT % 2*   MONO PCT % 1*   EOS PCT % 2     Results from last 7 days   Lab Units 08/11/23  0025   SODIUM mmol/L 135   POTASSIUM mmol/L 2.6*   CHLORIDE mmol/L 92*   CO2 mmol/L 32   BUN mg/dL 12   CREATININE mg/dL 0.59*   ANION GAP mmol/L 11   CALCIUM mg/dL 9.8   ALBUMIN g/dL 3.9   TOTAL BILIRUBIN mg/dL 0.84   ALK PHOS U/L 98   ALT U/L 21   AST U/L 62*   GLUCOSE RANDOM mg/dL 135     Results from last 7 days   Lab Units 08/11/23  0025   INR  1.08             Results from last 7 days   Lab Units 08/11/23  0251 08/11/23  0025   LACTIC ACID mmol/L 1.7 2.5*   PROCALCITONIN ng/ml  --  1.92*       Lines/Drains:  Invasive Devices     Peripheral Intravenous Line  Duration           Peripheral IV 08/11/23 Left Antecubital <1 day    Peripheral IV 08/11/23 Right Antecubital <1 day                Imaging: formal read pending  XR chest portable   ED Interpretation by Ana Deras DO (08/11 6460)   Mild pulmonary vascular congestion noted. CT renal protocol    (Results Pending)       EKG and Other Studies Reviewed on Admission:   · EKG: accelerated junctional 101 reviewed in MUSE personally. ** Please Note: This note has been constructed using a voice recognition system.  **

## 2023-08-11 NOTE — ASSESSMENT & PLAN NOTE
Patient with hypoxia on 3.5 L NC (not on oxygen at home). Given Neb in ED. Patient is a smoker with 1 pack/day no formal diagnosis of COPD.   Possible bronchitis  · steroids  · IV rocephin  · Pulmonary consult  · Flu/covid/rsv negative

## 2023-08-11 NOTE — PLAN OF CARE
Problem: SAFETY ADULT  Goal: Patient will remain free of falls  Description: INTERVENTIONS:  - Educate patient/family on patient safety including physical limitations  - Instruct patient to call for assistance with activity   - Consult OT/PT to assist with strengthening/mobility   - Keep Call bell within reach  - Keep bed low and locked with side rails adjusted as appropriate  - Keep care items and personal belongings within reach  - Initiate and maintain comfort rounds  - Make Fall Risk Sign visible to staff  - Offer Toileting every 2 Hours, in advance of need  - Initiate/Maintain bed alarm  - Obtain necessary fall risk management equipment non skid socks  - Apply yellow socks and bracelet for high fall risk patients  - Consider moving patient to room near nurses station  Outcome: Progressing

## 2023-08-11 NOTE — ASSESSMENT & PLAN NOTE
Lab Results   Component Value Date    HGBA1C 6.1 (H) 07/05/2023       No results for input(s): "POCGLU" in the last 72 hours.     Blood Sugar Average: Last 72 hrs:  · hold oral medication  · SSI coverage while in the hospital and adjust as needed  · Continue Lyrica for neuropathy

## 2023-08-11 NOTE — RESPIRATORY THERAPY NOTE
RT Protocol Note  Darlyn Millan 69 y.o. female MRN: [id number]  Unit/Bed#: -01 Encounter: [id number]    Assessment    Principal Problem:    Reactive airway disease with acute exacerbation  Active Problems:    Type 2 diabetes mellitus with diabetic polyneuropathy (HCC)    Anxiety and depression    GERD without esophagitis    Hypokalemia    Hematuria    Elevated troponin    Lactic acidosis    Hypomagnesemia      Home Pulmonary Medications:    Home Devices/Therapy: Other (Comment) (none)    Past Medical History:   Diagnosis Date    Anxiety     Closed fracture of coccyx ([address]) 5/24/2023    Diabetes mellitus ([address])     Hypertension     IBS (irritable bowel syndrome)      Social History     Socioeconomic History    Marital status: /Civil Union     Spouse name: None    Number of children: None    Years of education: None    Highest education level: None   Occupational History    None   Tobacco Use    Smoking status: Every Day     Packs/day: 1.00     Years: 50.00     Total pack years: 50.00     Types: Cigarettes     Start date: 1961    Smokeless tobacco: Never    Tobacco comments:     on & off    Vaping Use    Vaping Use: Never used   Substance and Sexual Activity    Alcohol use: Not Currently    Drug use: Never    Sexual activity: None   Other Topics Concern    None   Social History Narrative    None     Social Determinants of Health     Financial Resource Strain: Low Risk  (2/9/2023)    Overall Financial Resource Strain (CARDIA)     Difficulty of Paying Living Expenses: Not hard at all   Food Insecurity: Not on file   Transportation Needs: No Transportation Needs (2/9/2023)    PRAPARE - Transportation     Lack of Transportation (Medical): No     Lack of Transportation (Non-Medical):  No   Physical Activity: Not on file   Stress: Not on file   Social Connections: Not on file   Intimate Partner Violence: Not on file   Housing Stability: Not on file       Subjective         Objective    Physical Exam:   Assessment Type: Assess only  General Appearance: Awake, Alert (confused)  Respiratory Pattern: Tachypneic, Normal  Chest Assessment: Chest expansion symmetrical  Bilateral Breath Sounds: Diminished, Crackles (at bases)  O2 Device: (P) NC    Vitals:  Blood pressure 143/66, pulse (P) 104, temperature 100.3 °F (37.9 °C), temperature source Oral, resp. rate (P) 16, height 5' 1" (1.549 m), weight 71.5 kg (157 lb 8.3 oz), SpO2 (P) 92 %. Results from last 7 days   Lab Units 08/11/23  0130   PH ART  7.428   PCO2 ART mm Hg 47.4*   PO2 ART mm Hg 67.9*   HCO3 ART mmol/L 30.6*   BASE EXC ART mmol/L 5.2   O2 CONTENT ART mL/dL 19.0   O2 HGB, ARTERIAL % 90.1*   ABG SOURCE  Radial, Left   ABILIO TEST  Yes       Imaging and other studies: I have personally reviewed pertinent reports.       O2 Device: (P) NC     Plan    Respiratory Plan: (P)  (no pulm hx)        Resp Comments: (P) pt assessed was given tx by rn, pt here for UTI and confused  no COPD hx that hs been dx has smoking hx > 50 yrs and cont to smoke > 1 ppd, pt does not use any home resp meds, per CXR PUlm vascular congestion, BS dim crackles at bases, no resp distress no labored breathing, did ABG on 3.5 l/m NC 7.42/47/68/90% on 3.5 l/m, pt was lying on L side in no distress at that time of ABg draw 0100 , no indication for nebs at this time will cont prn txs

## 2023-08-11 NOTE — ED PROVIDER NOTES
History  Chief Complaint   Patient presents with   • Medical Problem     Not feeling well all week/ chills/unable to eat     Patient is a 81-year-old female with history of COPD who presents the emergency room due to mild confusion as well as a historic fever and according to the patient and family, urinating blood. The patient denies any pain except on her backside from laying on it, however denies any chest pain or shortness of breath. Patient's O2 sat has been anywhere between 88 to 92% on supplemental oxygen, but the patient appears to be in no acute distress. Patient is here for evaluation due to potential sepsis. Prior to Admission Medications   Prescriptions Last Dose Informant Patient Reported? Taking? Aspirin (ASPIR-81 PO)  Self Yes No   Sig: take one by mouth daily   Blood Glucose Monitoring Suppl (ONETOUCH VERIO) w/Device KIT  Self No No   Sig: by Does not apply route 2 (two) times a day   Januvia 100 MG tablet   No No   Sig: TAKE ONE TABLET BY MOUTH EVERY DAY   Lancets (OneTouch Delica Plus [id number]) MISC   No No   Sig: TEST TWICE A DAY   Misc.  Devices (Carex Coccyx Cushion) MISC   No No   Sig: Use in the morning   Omega-3 Fatty Acids (FISH OIL PO)  Self Yes No   Sig: take one tab/cap by mouth daily   acetaminophen (TYLENOL) 325 mg tablet  Self Yes No   Sig: Take 650 mg by mouth every 6 (six) hours as needed for mild pain   buPROPion (WELLBUTRIN XL) 300 mg 24 hr tablet   No No   Sig: TAKE ONE TABLET BY MOUTH EVERY DAY   clonazePAM (KlonoPIN) 1 mg tablet   No No   Sig: Take 1 tablet (1 mg total) by mouth every evening   glucose blood (OneTouch Verio) test strip   No No   Sig: TEST BLOOD GLUCOSE TWICE A DAY   hydrochlorothiazide (HYDRODIURIL) 50 mg tablet   No No   Sig: TAKE ONE TABLET BY MOUTH EVERY DAY   metFORMIN (GLUCOPHAGE-XR) 500 mg 24 hr tablet   No No   Sig: Take 2 tablets (1,000 mg total) by mouth 2 (two) times a day   pantoprazole (PROTONIX) 40 mg tablet   No No   Sig: Take 1 tablet (40 mg total) by mouth daily   potassium chloride (Klor-Con) 10 mEq tablet   No No   Sig: TAKE TWO TABLETS BY MOUTH EVERY DAY   pregabalin (LYRICA) 200 MG capsule   No No   Sig: TAKE ONE CAPSULE BY MOUTH THREE TIMES A DAY   solifenacin (VESICARE) 5 mg tablet   No No   Sig: Take 1 tablet (5 mg total) by mouth daily   sucralfate (CARAFATE) 1 g tablet  Self Yes No   Sig: Take 1 g by mouth 4 (four) times a day      Facility-Administered Medications: None       Past Medical History:   Diagnosis Date   • Anxiety    • Closed fracture of coccyx ([address]) 2023   • Diabetes mellitus (HCC)    • Hypertension    • IBS (irritable bowel syndrome)    • Shoulder fracture        Past Surgical History:   Procedure Laterality Date   • ANKLE FRACTURE SURGERY Right     has screw in place   •  SECTION      x2   • CHOLECYSTECTOMY     • HERNIA REPAIR      umbilicus w/ mesh       Family History   Problem Relation Age of Onset   • COPD Mother    • Emphysema Mother    • COPD Father    • Emphysema Father      I have reviewed and agree with the history as documented. E-Cigarette/Vaping   • E-Cigarette Use Never User      E-Cigarette/Vaping Substances   • Nicotine No    • THC No    • CBD No    • Flavoring No    • Other No    • Unknown No      Social History     Tobacco Use   • Smoking status: Every Day     Packs/day: 1.00     Years: 50.00     Total pack years: 50.00     Types: Cigarettes     Start date:    • Smokeless tobacco: Never   • Tobacco comments:     on & off    Vaping Use   • Vaping Use: Never used   Substance Use Topics   • Alcohol use: Not Currently   • Drug use: Never       Review of Systems   Constitutional: Positive for activity change, fatigue and fever. Negative for chills. HENT: Negative for ear pain and sore throat. Eyes: Negative for pain and visual disturbance. Respiratory: Negative for cough and shortness of breath. Cardiovascular: Negative for chest pain and palpitations.    Gastrointestinal: Negative for abdominal pain and vomiting. Genitourinary: Negative for dysuria and hematuria. Musculoskeletal: Negative for arthralgias and back pain. Skin: Negative for color change and rash. Neurological: Negative for seizures and syncope. All other systems reviewed and are negative. Physical Exam  Physical Exam  Vitals and nursing note reviewed. Constitutional:       General: She is not in acute distress. Appearance: Normal appearance. She is well-developed. She is ill-appearing. HENT:      Head: Normocephalic and atraumatic. Right Ear: External ear normal.      Left Ear: External ear normal.      Nose: Nose normal.      Mouth/Throat:      Mouth: Mucous membranes are moist.   Eyes:      Conjunctiva/sclera: Conjunctivae normal.   Cardiovascular:      Rate and Rhythm: Normal rate and regular rhythm. Pulses: Normal pulses. Heart sounds: Normal heart sounds. No murmur heard. Pulmonary:      Effort: Respiratory distress present. Breath sounds: Rhonchi present. Abdominal:      General: Bowel sounds are normal.      Palpations: Abdomen is soft. Tenderness: There is no abdominal tenderness. Musculoskeletal:         General: No swelling or deformity. Cervical back: Neck supple. No rigidity. Skin:     General: Skin is warm and dry. Capillary Refill: Capillary refill takes less than 2 seconds. Neurological:      General: No focal deficit present. Mental Status: She is alert and oriented to person, place, and time. Mental status is at baseline.          Vital Signs  ED Triage Vitals [08/11/23 0008]   Temperature Pulse Respirations Blood Pressure SpO2   100.3 °F (37.9 °C) (!) 107 16 135/63 92 %      Temp Source Heart Rate Source Patient Position - Orthostatic VS BP Location FiO2 (%)   Oral Monitor Sitting Left arm --      Pain Score       --           Vitals:    08/11/23 0230 08/11/23 0300 08/11/23 0500 08/11/23 0544   BP: 135/61 143/66  112/52   Pulse: 99 100 104 81   Patient Position - Orthostatic VS:    Lying         Visual Acuity      ED Medications  Medications   potassium chloride 20 mEq IVPB (premix) (20 mEq Intravenous New Bag 8/11/23 0607)   insulin lispro (HumaLOG) 100 units/mL subcutaneous injection 1-5 Units (has no administration in time range)   insulin lispro (HumaLOG) 100 units/mL subcutaneous injection 1-5 Units (has no administration in time range)   acetaminophen (TYLENOL) tablet 650 mg (has no administration in time range)   ondansetron (ZOFRAN) injection 4 mg (has no administration in time range)   nicotine (NICODERM CQ) 21 mg/24 hr TD 24 hr patch 1 patch (has no administration in time range)   enoxaparin (LOVENOX) subcutaneous injection 40 mg (has no administration in time range)   cefTRIAXone (ROCEPHIN) IVPB (premix in dextrose) 2,000 mg 50 mL (2,000 mg Intravenous New Bag 8/11/23 0448)   predniSONE tablet 40 mg (has no administration in time range)   magnesium sulfate 4 g/100 mL IVPB (premix) 4 g (has no administration in time range)   albuterol inhalation solution 2.5 mg (has no administration in time range)   aspirin chewable tablet 81 mg (has no administration in time range)   buPROPion (WELLBUTRIN XL) 24 hr tablet 300 mg (has no administration in time range)   clonazePAM (KlonoPIN) tablet 1 mg (has no administration in time range)   pregabalin (LYRICA) capsule 200 mg (has no administration in time range)   sucralfate (CARAFATE) tablet 1 g (has no administration in time range)   potassium chloride (K-DUR,KLOR-CON) CR tablet 20 mEq (has no administration in time range)   pantoprazole (PROTONIX) EC tablet 40 mg (40 mg Oral Given 8/11/23 0630)   fish oil capsule 1,000 mg (has no administration in time range)   potassium chloride 20 mEq IVPB (premix) (0 mEq Intravenous Stopped 8/11/23 0436)   sodium chloride 0.9 % bolus 1,000 mL (1,000 mL Intravenous New Bag 8/11/23 0152)   ipratropium-albuterol (DUO-NEB) 0.5-2.5 mg/3 mL inhalation solution 3 mL (3 mL Nebulization Given 8/11/23 0239)   aspirin chewable tablet 324 mg (324 mg Oral Given 8/11/23 0424)   potassium chloride (K-DUR,KLOR-CON) CR tablet 40 mEq (40 mEq Oral Given 8/11/23 0607)   iohexol (OMNIPAQUE) 350 MG/ML injection (SINGLE-DOSE) 100 mL (100 mL Intravenous Given 8/11/23 0504)       Diagnostic Studies  Results Reviewed     Procedure Component Value Units Date/Time    FLU/RSV/COVID - if FLU/RSV clinically relevant [[id number]]  (Normal) Collected: 08/11/23 0430    Lab Status: Final result Specimen: Nares from Nose Updated: 08/11/23 0518     SARS-CoV-2 Negative     INFLUENZA A PCR Negative     INFLUENZA B PCR Negative     RSV PCR Negative    Narrative:      FOR PEDIATRIC PATIENTS - copy/paste COVID Guidelines URL to browser: [email or web address]    SARS-CoV-2 assay is a Nucleic Acid Amplification assay intended for the  qualitative detection of nucleic acid from SARS-CoV-2 in nasopharyngeal  swabs. Results are for the presumptive identification of SARS-CoV-2 RNA. Positive results are indicative of infection with SARS-CoV-2, the virus  causing COVID-19, but do not rule out bacterial infection or co-infection  with other viruses. Laboratories within the [facility] and its  territories are required to report all positive results to the appropriate  public health authorities. Negative results do not preclude SARS-CoV-2  infection and should not be used as the sole basis for treatment or other  patient management decisions. Negative results must be combined with  clinical observations, patient history, and epidemiological information. This test has not been FDA cleared or approved. This test has been authorized by FDA under an Emergency Use Authorization  (EUA).  This test is only authorized for the duration of time the  declaration that circumstances exist justifying the authorization of the  emergency use of an in vitro diagnostic tests for detection of SARS-CoV-2  virus and/or diagnosis of COVID-19 infection under section 564(b)(1) of  the Act, 21 U. S.C. 779TSM-7(X)(5), unless the authorization is terminated  or revoked sooner. The test has been validated but independent review by FDA  and CLIA is pending. Test performed using [employer] GeneXpert: This RT-PCR assay targets N2,  a region unique to SARS-CoV-2. A conserved region in the E-gene was chosen  for pan-Sarbecovirus detection which includes SARS-CoV-2. According to CMS-2020-01-R, this platform meets the definition of high-throughput technology.     HS Troponin I 4hr [[id number]]  (Abnormal) Collected: 08/11/23 0430    Lab Status: Final result Specimen: Blood from Line, Venous Updated: 08/11/23 0506     hs TnI 4hr 129 ng/L      Delta 4hr hsTnI 96 ng/L     Magnesium [[id number]]  (Abnormal) Collected: 08/11/23 0025    Lab Status: Final result Specimen: Blood from Arm, Right Updated: 08/11/23 0435     Magnesium 1.0 mg/dL     Urine Microscopic [[id number]]  (Abnormal) Collected: 08/11/23 0325    Lab Status: Final result Specimen: Urine, Straight Cath Updated: 08/11/23 0339     RBC, UA Innumerable /hpf      WBC, UA 1-2 /hpf      Epithelial Cells Occasional /hpf      Bacteria, UA Moderate /hpf      MUCUS THREADS Occasional    UA w Reflex to Microscopic w Reflex to Culture [[id number]]  (Abnormal) Collected: 08/11/23 0325    Lab Status: Final result Specimen: Urine, Straight Cath Updated: 08/11/23 0332     Color, UA Zoe     Clarity, UA Clear     Specific Gravity, UA 1.020     pH, UA 5.5     Leukocytes, UA Negative     Nitrite, UA Negative     Protein, UA 2+ mg/dl      Glucose, UA Negative mg/dl      Ketones, UA Trace mg/dl      Urobilinogen, UA 0.2 E.U./dl      Bilirubin, UA Negative     Occult Blood, UA 3+    HS Troponin I 2hr [[id number]]  (Abnormal) Collected: 08/11/23 0251    Lab Status: Final result Specimen: Blood from Line, Venous Updated: 08/11/23 0320     hs TnI 2hr 107 ng/L      Delta 2hr hsTnI 74 ng/L     Lactic acid 2 Hours [[id number]]  (Normal) Collected: 08/11/23 0251    Lab Status: Final result Specimen: Blood from Line, Venous Updated: 08/11/23 0316     LACTIC ACID 1.7 mmol/L     Narrative:      Result may be elevated if tourniquet was used during collection. HS Troponin 0hr (reflex protocol) [[id number]]  (Normal) Collected: 08/11/23 0025    Lab Status: Final result Specimen: Blood Updated: 08/11/23 0205     hs TnI 0hr 33 ng/L     Blood gas, arterial [[id number]]  (Abnormal) Collected: 08/11/23 0130    Lab Status: Final result Specimen: Blood, Arterial from Radial, Left Updated: 08/11/23 0135     pH, Arterial 7.428     pCO2, Arterial 47.4 mm Hg      pO2, Arterial 67.9 mm Hg      HCO3, Arterial 30.6 mmol/L      Base Excess, Arterial 5.2 mmol/L      O2 Content, Arterial 19.0 mL/dL      O2 HGB,Arterial  90.1 %      SOURCE Radial, Left     ABILIO TEST Yes     Nasal Cannula 3.5    RBC Morphology Reflex Test [[id number]] Collected: 08/11/23 0025    Lab Status: Final result Specimen: Blood from Arm, Right Updated: 08/11/23 0102    CBC and differential [[id number]]  (Abnormal) Collected: 08/11/23 0025    Lab Status: Final result Specimen: Blood from Arm, Right Updated: 08/11/23 0100     WBC 8.74 Thousand/uL      RBC 4.88 Million/uL      Hemoglobin 15.5 g/dL      Hematocrit 45.3 %      MCV 93 fL      MCH 31.8 pg      MCHC 34.2 g/dL      RDW 12.5 %      MPV 9.5 fL      Platelets 502 Thousands/uL     Narrative: This is an appended report. These results have been appended to a previously verified report.     Manual Differential(PHLEBS Do Not Order) [[id number]]  (Abnormal) Collected: 08/11/23 0025    Lab Status: Final result Specimen: Blood from Arm, Right Updated: 08/11/23 0100     Segmented % 86 %      Bands % 9 %      Lymphocytes % 2 %      Monocytes % 1 %      Eosinophils, % 2 %      Basophils % 0 %      Absolute Neutrophils 8.30 Thousand/uL      Lymphocytes Absolute 0.17 Thousand/uL      Monocytes Absolute 0.09 Thousand/uL      Eosinophils Absolute 0.17 Thousand/uL      Basophils Absolute 0.00 Thousand/uL      Total Counted --     RBC Morphology Normal     Platelet Estimate Adequate    Blood culture #1 [[id number]] Collected: 08/11/23 0055    Lab Status: In process Specimen: Blood from Arm, Left Updated: 08/11/23 0059    Procalcitonin [[id number]]  (Abnormal) Collected: 08/11/23 0025    Lab Status: Final result Specimen: Blood from Arm, Right Updated: 08/11/23 0058     Procalcitonin 1.92 ng/ml     Lactic acid [[id number]]  (Abnormal) Collected: 08/11/23 0025    Lab Status: Final result Specimen: Blood from Arm, Right Updated: 08/11/23 0057     LACTIC ACID 2.5 mmol/L     Narrative:      Result may be elevated if tourniquet was used during collection.     Comprehensive metabolic panel [[id number]]  (Abnormal) Collected: 08/11/23 0025    Lab Status: Final result Specimen: Blood from Arm, Right Updated: 08/11/23 0056     Sodium 135 mmol/L      Potassium 2.6 mmol/L      Chloride 92 mmol/L      CO2 32 mmol/L      ANION GAP 11 mmol/L      BUN 12 mg/dL      Creatinine 0.59 mg/dL      Glucose 135 mg/dL      Calcium 9.8 mg/dL      AST 62 U/L      ALT 21 U/L      Alkaline Phosphatase 98 U/L      Total Protein 6.6 g/dL      Albumin 3.9 g/dL      Total Bilirubin 0.84 mg/dL      eGFR 88 ml/min/1.73sq m     Narrative:      [facility] guidelines for Chronic Kidney Disease (CKD):   •  Stage 1 with normal or high GFR (GFR > 90 mL/min/1.73 square meters)  •  Stage 2 Mild CKD (GFR = 60-89 mL/min/1.73 square meters)  •  Stage 3A Moderate CKD (GFR = 45-59 mL/min/1.73 square meters)  •  Stage 3B Moderate CKD (GFR = 30-44 mL/min/1.73 square meters)  •  Stage 4 Severe CKD (GFR = 15-29 mL/min/1.73 square meters)  •  Stage 5 End Stage CKD (GFR <15 mL/min/1.73 square meters)  Note: GFR calculation is accurate only with a steady state creatinine    Protime-INR [[id number]]  (Normal) Collected: 08/11/23 0025    Lab Status: Final result Specimen: Blood from Arm, Right Updated: 08/11/23 0045     Protime 14.1 seconds      INR 1.08    APTT [[id number]]  (Normal) Collected: 08/11/23 0025    Lab Status: Final result Specimen: Blood from Arm, Right Updated: 08/11/23 0045     PTT 28 seconds     Blood culture #2 [[id number]] Collected: 08/11/23 0025    Lab Status: In process Specimen: Blood from Arm, Right Updated: 08/11/23 0029                 CT renal protocol   Final Result by Jaswant Matthews MD (08/11 0545)      1.4 cm nodular soft tissue in the upper pole collecting system of the left kidney raising the question of a urothelial tumor. Suggest urology consultation. Mildly enlarged celiac axis and portacaval lymph nodes, which are nonspecific. No retroperitoneal adenopathy. The study was marked in [facility] for immediate notification. Workstation performed: [id number]         XR chest portable   ED Interpretation by Jose Parham DO (08/11 9599)   Mild pulmonary vascular congestion noted. Procedures  ECG 12 Lead Documentation Only    Date/Time: 8/11/2023 12:56 AM    Performed by: Jose Parham DO  Authorized by: Jose Parham DO    ECG reviewed by me, the ED Provider: yes    Patient location:  ED  Comments:      EKG is a sinus tachycardia 105 beats a minute with left axis deviation. There is a left anterior fascicular block pattern noted. There is 1 PVC noted. The patient has nonspecific anterior septal T wave flattening otherwise no other definitive acute ST or T wave changes present. ED Course  ED Course as of 08/11/23 0634   Fri Aug 11, 2023   2299 LACTIC ACID(!!): 2.5   0101 Bands Relative(!): 9   0208 hs TnI 0hr: 35   0234 Patient found to be very wheezy on evaluation. DuoNeb ordered. SBIRT 20yo+    Flowsheet Row Most Recent Value   Initial Alcohol Screen: US AUDIT-C     1.  How often do you have a drink containing alcohol? 0 Filed at: 08/11/2023 0019   2. How many drinks containing alcohol do you have on a typical day you are drinking? 0 Filed at: 08/11/2023 0019   3a. Male UNDER 65: How often do you have five or more drinks on one occasion? 0 Filed at: 08/11/2023 0019   3b. FEMALE Any Age, or MALE 65+: How often do you have 4 or more drinks on one occassion? 0 Filed at: 08/11/2023 0019   Audit-C Score 0 Filed at: 08/11/2023 0404   JENNIFER: How many times in the past year have you. .. Used an illegal drug or used a prescription medication for non-medical reasons? Never Filed at: 08/11/2023 0019                    Medical Decision Making  Patient is 81-year-old female who presents emergency department complaining of fever and hematuria. According to the family she was diagnosed with a urinary tract infection some days ago and did have a course of Macrobid. They noticed that she finished the last pill a few days ago and are not sure if that may be part of the problem. Family did not like the way she was acting and felt that that she seemed confused at times and has had a fever over 100. The patient is having hematuria for the last week or so despite antibiotics for urinary tract infection. On my evaluation, the patient seems slightly fatigued and having O2 sats in the high 80s. The patient does have a history of COPD and supplemental oxygen was started. The patient's chest x-ray shows no definitive pneumonia, however troponin continues to elevate. Patient denies chest pain however due to these changes, it is felt that the patient should be admitted for further evaluation. Family concerned about hematuria so this was discussed with the medicine service will order CT of the kidneys. Patient will be admitted    Amount and/or Complexity of Data Reviewed  Labs: ordered. Decision-making details documented in ED Course. Radiology: ordered and independent interpretation performed. Risk  OTC drugs.   Prescription drug management. Decision regarding hospitalization. Disposition  Final diagnoses:   Hypoxia     Time reflects when diagnosis was documented in both MDM as applicable and the Disposition within this note     Time User Action Codes Description Comment    8/11/2023  4:27 AM Gay SALINAS Add [R09.02] Hypoxia       ED Disposition     ED Disposition   Admit    Condition   Stable    Date/Time   Fri Aug 11, 2023  4:15 AM    Comment   Case was discussed with Jessica Law and the patient's admission status was agreed to be Admission Status: observation status to the service of Dr. Estephania Smart.            Follow-up Information    None         Current Discharge Medication List      CONTINUE these medications which have NOT CHANGED    Details   acetaminophen (TYLENOL) 325 mg tablet Take 650 mg by mouth every 6 (six) hours as needed for mild pain      Aspirin (ASPIR-81 PO) take one by mouth daily      Blood Glucose Monitoring Suppl (Anastacio Gabriel) w/Device KIT by Does not apply route 2 (two) times a day  Qty: 1 kit, Refills: 0    Associated Diagnoses: Type 2 diabetes mellitus with diabetic polyneuropathy, without long-term current use of insulin ([facility])      buPROPion (WELLBUTRIN XL) 300 mg 24 hr tablet TAKE ONE TABLET BY MOUTH EVERY DAY  Qty: 90 tablet, Refills: 1    Associated Diagnoses: Anxiety and depression      clonazePAM (KlonoPIN) 1 mg tablet Take 1 tablet (1 mg total) by mouth every evening  Qty: 30 tablet, Refills: 3    Associated Diagnoses: Anxiety and depression      glucose blood (OneTouch Verio) test strip TEST BLOOD GLUCOSE TWICE A DAY  Qty: 100 strip, Refills: 5    Associated Diagnoses: Type 2 diabetes mellitus with diabetic polyneuropathy, without long-term current use of insulin ([facility])      hydrochlorothiazide (HYDRODIURIL) 50 mg tablet TAKE ONE TABLET BY MOUTH EVERY DAY  Qty: 90 tablet, Refills: 3    Associated Diagnoses: Essential hypertension      Januvia 100 MG tablet TAKE ONE TABLET BY MOUTH EVERY DAY  Qty: 90 tablet, Refills: 2    Associated Diagnoses: Essential hypertension; Type 2 diabetes mellitus with diabetic polyneuropathy, without long-term current use of insulin ([facility])      Lancets (OneTouch Delica Plus [id number]) MISC TEST TWICE A DAY  Qty: 100 each, Refills: 5    Associated Diagnoses: Type 2 diabetes mellitus with diabetic polyneuropathy, without long-term current use of insulin ([facility])      metFORMIN (GLUCOPHAGE-XR) 500 mg 24 hr tablet Take 2 tablets (1,000 mg total) by mouth 2 (two) times a day  Qty: 360 tablet, Refills: 0    Associated Diagnoses: Uncontrolled type 2 diabetes mellitus with hyperglycemia ([address])      Misc. Devices (Carex Coccyx Cushion) MISC Use in the morning  Qty: 1 each, Refills: 0    Associated Diagnoses: Closed fracture of coccyx, initial encounter ([facility])      Omega-3 Fatty Acids (FISH OIL PO) take one tab/cap by mouth daily      pantoprazole (PROTONIX) 40 mg tablet Take 1 tablet (40 mg total) by mouth daily  Qty: 90 tablet, Refills: 3    Associated Diagnoses: GERD without esophagitis      potassium chloride (Klor-Con) 10 mEq tablet TAKE TWO TABLETS BY MOUTH EVERY DAY  Qty: 180 tablet, Refills: 1    Associated Diagnoses: Essential hypertension      pregabalin (LYRICA) 200 MG capsule TAKE ONE CAPSULE BY MOUTH THREE TIMES A DAY  Qty: 90 capsule, Refills: 0    Associated Diagnoses: Type 2 diabetes mellitus with diabetic polyneuropathy, without long-term current use of insulin ([facility])      solifenacin (VESICARE) 5 mg tablet Take 1 tablet (5 mg total) by mouth daily  Qty: 90 tablet, Refills: 0    Associated Diagnoses: Mixed stress and urge urinary incontinence      sucralfate (CARAFATE) 1 g tablet Take 1 g by mouth 4 (four) times a day             No discharge procedures on file.     PDMP Review     None          ED Provider  Electronically Signed by           Stefani Escobar.,   08/11/23 9220

## 2023-08-11 NOTE — ASSESSMENT & PLAN NOTE
Patient noted blood in urine at home. UA 3+ blood.   Family reports rust colored urine for 2 weeks  · Patient reports she completed 2 rounds of antibiotics  · Will obtain CT renal protocol

## 2023-08-11 NOTE — CONSULTS
PULMONOLOGY CONSULT NOTE     Name: Av Herrmann   Age & Sex: 69 y.o. female   MRN: [id number]  Unit/Bed#: -01   Encounter: [id number]        Reason for consultation: Hypoxemia    Requesting physician: Internal Medicine    Assessment:  Acute hypoxemic respiratory failure- now 89-95% on room air- could have had some component of exacerbation of underlying undiagnosed airways disease- no PFTs  Hematuria with 1.4 cm Left kidney lesion concerning for urothelial tumor  Possible UTI  Abnormal Chest CT- multiple perilymphatic reticulonodular densities- more in Upper lung zones- infectious/inflammatory vs metastatic (although not typical appearing for this). She has no prior imaging to review. No relevant occupational/environmental factors. Tobacco use disorder    Recommendations:  Wean off O2 for SPO2 >88%  OK to continue prednisone for 5 days given presentation per H/P .  PRN albuterol  Would treat with antibiotics for 5 days- currently ceftriaxone  Check RP2 panel  Check sputum culture if she makes sputum  Recommend outpatient PFTs and PRN albuterol on discharge  Recommend outpatient CT chest in 2 months to reevaluate the nodules and follow-up with me in the office  Recommend urology evaluation as inpatient regarding hematuria and kidney lesion    Discussed with Dr Estephania Smart with [facility] in person      History of Present Illness   HPI:  Av Herrmann is a 69 y.o. female with PMHx tobacco use disorder, GERD, prediabetes, hypertension who presents with fevers/chills, urinary frequency, urine changes- rust-colored. No dyspnea beyond her baseline (which is with exertion walking distances). She has a chronic cough-occasionally productive. No wheeze. No chest pain. She has smoked 1.5-2 PPD x 63 years. No vaping. No drugs  She has 3 dogs, 3 cats. No birds.  No down feathers in house  No rashes  No joint pain  Fam history regarding pulmonary- father  of emphysema, was a smoker  She has no history of lung disease other than being told she "might have COPD."  She has never been on inhalers  No PFTs  She was put on O2 earlier. S he currently is on room air SPO2 89-95% in the room while I am talking to her. Outpatient notes with SPO2 93-95%    Per H/P she had some wheezing on exam.  She was put on steroids and antibiotics      Review of systems:  12 point review of systems was completed and was otherwise negative except as listed in HPI.       Historical Information   Past Medical History:   Diagnosis Date   • Anxiety    • Closed fracture of coccyx ([address]) 2023   • Diabetes mellitus (HCC)    • Hypertension    • IBS (irritable bowel syndrome)    • Shoulder fracture      Past Surgical History:   Procedure Laterality Date   • ANKLE FRACTURE SURGERY Right     has screw in place   •  SECTION      x2   • CHOLECYSTECTOMY     • HERNIA REPAIR      umbilicus w/ mesh     Family History   Problem Relation Age of Onset   • COPD Mother    • Emphysema Mother    • COPD Father    • Emphysema Father          Social History:   Social History     Tobacco Use   Smoking Status Every Day   • Packs/day: 1.00   • Years: 50.00   • Total pack years: 50.00   • Types: Cigarettes   • Start date:    Smokeless Tobacco Never   Tobacco Comments    on & off          Meds/Allergies   Current Facility-Administered Medications   Medication Dose Route Frequency   • acetaminophen (TYLENOL) tablet 650 mg  650 mg Oral Q4H PRN   • albuterol inhalation solution 2.5 mg  2.5 mg Nebulization Q4H PRN   • aspirin chewable tablet 81 mg  81 mg Oral Daily   • buPROPion (WELLBUTRIN XL) 24 hr tablet 300 mg  300 mg Oral Daily   • cefTRIAXone (ROCEPHIN) IVPB (premix in dextrose) 2,000 mg 50 mL  2,000 mg Intravenous Q24H   • clonazePAM (KlonoPIN) tablet 1 mg  1 mg Oral HS   • enoxaparin (LOVENOX) subcutaneous injection 40 mg  40 mg Subcutaneous Daily   • fish oil capsule 1,000 mg  1,000 mg Oral Daily   • insulin lispro (HumaLOG) 100 units/mL subcutaneous injection 1-5 Units 1-5 Units Subcutaneous TID AC   • insulin lispro (HumaLOG) 100 units/mL subcutaneous injection 1-5 Units  1-5 Units Subcutaneous HS   • nicotine (NICODERM CQ) 21 mg/24 hr TD 24 hr patch 1 patch  1 patch Transdermal Daily   • ondansetron (ZOFRAN) injection 4 mg  4 mg Intravenous Q6H PRN   • pantoprazole (PROTONIX) EC tablet 40 mg  40 mg Oral Daily   • potassium chloride (K-DUR,KLOR-CON) CR tablet 20 mEq  20 mEq Oral Daily With Breakfast   • predniSONE tablet 40 mg  40 mg Oral Daily   • pregabalin (LYRICA) capsule 200 mg  200 mg Oral TID   • sucralfate (CARAFATE) tablet 1 g  1 g Oral 4x Daily     Medications Prior to Admission   Medication   • acetaminophen (TYLENOL) 325 mg tablet   • Aspirin (ASPIR-81 PO)   • Blood Glucose Monitoring Suppl (ONETOUCH VERIO) w/Device KIT   • buPROPion (WELLBUTRIN XL) 300 mg 24 hr tablet   • clonazePAM (KlonoPIN) 1 mg tablet   • glucose blood (OneTouch Verio) test strip   • hydrochlorothiazide (HYDRODIURIL) 50 mg tablet   • Januvia 100 MG tablet   • Lancets (OneTouch Delica Plus [id number]) MISC   • metFORMIN (GLUCOPHAGE-XR) 500 mg 24 hr tablet   • Misc. Devices (Carex Coccyx Cushion) MISC   • Omega-3 Fatty Acids (FISH OIL PO)   • pantoprazole (PROTONIX) 40 mg tablet   • potassium chloride (Klor-Con) 10 mEq tablet   • pregabalin (LYRICA) 200 MG capsule   • solifenacin (VESICARE) 5 mg tablet   • sucralfate (CARAFATE) 1 g tablet     Allergies   Allergen Reactions   • Adhesive [Medical Tape]    • Latex    • Paroxetine    • Sulfa Antibiotics    • Sulfamethoxazole-Trimethoprim Hives       Vitals: Blood pressure (!) 110/46, pulse 72, temperature 97.8 °F (36.6 °C), resp. rate 18, height 5' 1" (1.549 m), weight 72 kg (158 lb 11.7 oz), SpO2 92 %., 2.5 lpm of supplemental oxygen, Body mass index is 29.99 kg/m².       Intake/Output Summary (Last 24 hours) at 8/11/2023 1132  Last data filed at 8/11/2023 1100  Gross per 24 hour   Intake 0 ml   Output 200 ml   Net -200 ml       Physical Exam    Labs: I have personally reviewed pertinent lab results. Laboratory and Diagnostics  Results from last 7 days   Lab Units 08/11/23  0025   WBC Thousand/uL 8.74   HEMOGLOBIN g/dL 15.5*   HEMATOCRIT % 45.3   PLATELETS Thousands/uL 172   BANDS PCT % 9*   MONO PCT % 1*   EOS PCT % 2     Results from last 7 days   Lab Units 08/11/23  0859 08/11/23  0025   SODIUM mmol/L 130* 135   POTASSIUM mmol/L 3.4* 2.6*   CHLORIDE mmol/L 90* 92*   CO2 mmol/L 31 32   ANION GAP mmol/L 9 11   BUN mg/dL 12 12   CREATININE mg/dL 0.52* 0.59*   CALCIUM mg/dL 9.1 9.8   GLUCOSE RANDOM mg/dL 169* 135   ALT U/L  --  21   AST U/L  --  62*   ALK PHOS U/L  --  98   ALBUMIN g/dL  --  3.9   TOTAL BILIRUBIN mg/dL  --  0.84     Results from last 7 days   Lab Units 08/11/23  0025   MAGNESIUM mg/dL 1.0*      Results from last 7 days   Lab Units 08/11/23  0025   INR  1.08   PTT seconds 28          Results from last 7 days   Lab Units 08/11/23  0251 08/11/23  0025   LACTIC ACID mmol/L 1.7 2.5*                     Results from last 7 days   Lab Units 08/11/23  0025   PROCALCITONIN ng/ml 1.92*       Microbiology:  Results from last 7 days   Lab Units 08/11/23  0055 08/11/23  0025   BLOOD CULTURE  Received in Microbiology Lab. Culture in Progress. Received in Microbiology Lab. Culture in Progress. Component      Latest Ref Rng 8/11/2023   Color, UA      Yellow, Straw  Zoe ! Clarity, UA      Hazy, Clear  Clear    SL AMB SPECIFIC GRAVITY_URINE      >1.005 - <1.030  1.020    pH, UA      5.0, 5.5, 6.0, 6.5, 7.0, 7.5  5.5    Leukocytes, UA      Negative  Negative    Nitrite, UA      Negative  Negative    POCT URINE PROTEIN      Negative, Interference- unable to analyze mg/dl 2+ ! Glucose, UA      Negative mg/dl Negative    Ketones, UA      Negative mg/dl Trace ! SL AMB POCT UROBILINOGEN      0.2, 1.0 E.U./dl E.U./dl 0.2    Bilirubin, UA      Negative  Negative    Blood, UA      Negative  3+ !     RBC, UA      None Seen, 0-1, 1-2, 2-4, 0-5 /hpf Innumerable !    WBC, UA      None Seen, 0-1, 1-2, 0-5, 2-4 /hpf 1-2    Epithelial Cells      None Seen, Occasional /hpf Occasional    Bacteria, UA      None Seen, Occasional /hpf Moderate ! MUCUS THREADS      None Seen  Occasional !       Legend:  ! Abnormal      ABG:   Lab Results   Component Value Date    PHART 7.428 08/11/2023    LGX4NBZ 47.4 (H) 08/11/2023    PO2ART 67.9 (L) 08/11/2023    IEH6MGT 30.6 (H) 08/11/2023    BEART 5.2 08/11/2023    SOURCE Radial, Left 08/11/2023         Imaging and other studies: I have personally reviewed pertinent reports. and I have personally reviewed pertinent films in PACS     CT Chest 8/11/23  Extensive predominantly subpleural perilymphatic reticulonodular densities, predominantly upper lung zones, likely infectious or inflammatory. Small consolidation in the subpleural right lower lobe. Consider follow-up. Coronary artery calcifications. Precarinal lymph node, mildly enlarged, possibly reactive. XR chest portable  Result Date: 8/11/2023  Impression: Increased interstitial lung markings. Differential includes emphysema, mild interstitial pulmonary edema, atypical pneumonia. CT renal protocol  Result Date: 8/11/2023  Impression: 1.4 cm nodular soft tissue in the upper pole collecting system of the left kidney raising the question of a urothelial tumor. Suggest urology consultation. Mildly enlarged celiac axis and portacaval lymph nodes, which are nonspecific. No retroperitoneal adenopathy. Pulmonary function testing: None available    EKG, Pathology, and Other Studies: I have personally reviewed pertinent reports.         Code Status: Level 3 - DNAR and DNI        Jessica Troncoso MD  Attending Physician  Pulmonary and Critical Care Medicine

## 2023-08-12 LAB
ANION GAP SERPL CALCULATED.3IONS-SCNC: 5 MMOL/L
BUN SERPL-MCNC: 15 MG/DL (ref 5–25)
CALCIUM SERPL-MCNC: 9.1 MG/DL (ref 8.4–10.2)
CHLORIDE SERPL-SCNC: 91 MMOL/L (ref 96–108)
CO2 SERPL-SCNC: 33 MMOL/L (ref 21–32)
CREAT SERPL-MCNC: 0.59 MG/DL (ref 0.6–1.3)
ERYTHROCYTE [DISTWIDTH] IN BLOOD BY AUTOMATED COUNT: 12.7 % (ref 11.6–15.1)
GFR SERPL CREATININE-BSD FRML MDRD: 88 ML/MIN/1.73SQ M
GLUCOSE SERPL-MCNC: 130 MG/DL (ref 65–140)
GLUCOSE SERPL-MCNC: 152 MG/DL (ref 65–140)
GLUCOSE SERPL-MCNC: 178 MG/DL (ref 65–140)
GLUCOSE SERPL-MCNC: 227 MG/DL (ref 65–140)
GLUCOSE SERPL-MCNC: 264 MG/DL (ref 65–140)
HCT VFR BLD AUTO: 37.4 % (ref 34.8–46.1)
HGB BLD-MCNC: 12.6 G/DL (ref 11.5–15.4)
MAGNESIUM SERPL-MCNC: 2.2 MG/DL (ref 1.9–2.7)
MCH RBC QN AUTO: 31.7 PG (ref 26.8–34.3)
MCHC RBC AUTO-ENTMCNC: 33.7 G/DL (ref 31.4–37.4)
MCV RBC AUTO: 94 FL (ref 82–98)
PLATELET # BLD AUTO: 199 THOUSANDS/UL (ref 149–390)
PMV BLD AUTO: 10.1 FL (ref 8.9–12.7)
POTASSIUM SERPL-SCNC: 4 MMOL/L (ref 3.5–5.3)
RBC # BLD AUTO: 3.97 MILLION/UL (ref 3.81–5.12)
SODIUM SERPL-SCNC: 129 MMOL/L (ref 135–147)
WBC # BLD AUTO: 10.14 THOUSAND/UL (ref 4.31–10.16)

## 2023-08-12 PROCEDURE — 99222 1ST HOSP IP/OBS MODERATE 55: CPT | Performed by: INTERNAL MEDICINE

## 2023-08-12 PROCEDURE — 99232 SBSQ HOSP IP/OBS MODERATE 35: CPT | Performed by: INTERNAL MEDICINE

## 2023-08-12 PROCEDURE — 80048 BASIC METABOLIC PNL TOTAL CA: CPT | Performed by: PHYSICIAN ASSISTANT

## 2023-08-12 PROCEDURE — 83735 ASSAY OF MAGNESIUM: CPT | Performed by: PHYSICIAN ASSISTANT

## 2023-08-12 PROCEDURE — 99232 SBSQ HOSP IP/OBS MODERATE 35: CPT | Performed by: HOSPITALIST

## 2023-08-12 PROCEDURE — 85027 COMPLETE CBC AUTOMATED: CPT | Performed by: PHYSICIAN ASSISTANT

## 2023-08-12 PROCEDURE — 82948 REAGENT STRIP/BLOOD GLUCOSE: CPT

## 2023-08-12 RX ORDER — LISINOPRIL 2.5 MG/1
2.5 TABLET ORAL DAILY
Status: DISCONTINUED | OUTPATIENT
Start: 2023-08-12 | End: 2023-08-14 | Stop reason: HOSPADM

## 2023-08-12 RX ORDER — ATORVASTATIN CALCIUM 10 MG/1
10 TABLET, FILM COATED ORAL
Status: DISCONTINUED | OUTPATIENT
Start: 2023-08-12 | End: 2023-08-14 | Stop reason: HOSPADM

## 2023-08-12 RX ORDER — INSULIN GLARGINE 100 [IU]/ML
10 INJECTION, SOLUTION SUBCUTANEOUS
Status: DISCONTINUED | OUTPATIENT
Start: 2023-08-12 | End: 2023-08-14 | Stop reason: HOSPADM

## 2023-08-12 RX ADMIN — ASPIRIN 81 MG CHEWABLE TABLET 81 MG: 81 TABLET CHEWABLE at 08:10

## 2023-08-12 RX ADMIN — PANTOPRAZOLE SODIUM 40 MG: 40 TABLET, DELAYED RELEASE ORAL at 05:56

## 2023-08-12 RX ADMIN — BUPROPION HYDROCHLORIDE 300 MG: 150 TABLET, EXTENDED RELEASE ORAL at 08:10

## 2023-08-12 RX ADMIN — PREGABALIN 200 MG: 100 CAPSULE ORAL at 08:10

## 2023-08-12 RX ADMIN — SUCRALFATE 1 G: 1 TABLET ORAL at 17:05

## 2023-08-12 RX ADMIN — ATORVASTATIN CALCIUM 10 MG: 10 TABLET, FILM COATED ORAL at 16:35

## 2023-08-12 RX ADMIN — SUCRALFATE 1 G: 1 TABLET ORAL at 21:29

## 2023-08-12 RX ADMIN — CLONAZEPAM 1 MG: 1 TABLET ORAL at 21:29

## 2023-08-12 RX ADMIN — INSULIN LISPRO 2 UNITS: 100 INJECTION, SOLUTION INTRAVENOUS; SUBCUTANEOUS at 16:35

## 2023-08-12 RX ADMIN — POTASSIUM CHLORIDE 20 MEQ: 750 TABLET, EXTENDED RELEASE ORAL at 07:22

## 2023-08-12 RX ADMIN — ENOXAPARIN SODIUM 40 MG: 40 INJECTION SUBCUTANEOUS at 08:10

## 2023-08-12 RX ADMIN — SUCRALFATE 1 G: 1 TABLET ORAL at 08:10

## 2023-08-12 RX ADMIN — LISINOPRIL 2.5 MG: 2.5 TABLET ORAL at 11:18

## 2023-08-12 RX ADMIN — INSULIN GLARGINE 10 UNITS: 100 INJECTION, SOLUTION SUBCUTANEOUS at 21:28

## 2023-08-12 RX ADMIN — OMEGA-3 FATTY ACIDS CAP 1000 MG 1000 MG: 1000 CAP at 08:10

## 2023-08-12 RX ADMIN — PREGABALIN 200 MG: 100 CAPSULE ORAL at 21:29

## 2023-08-12 RX ADMIN — INSULIN LISPRO 2 UNITS: 100 INJECTION, SOLUTION INTRAVENOUS; SUBCUTANEOUS at 11:19

## 2023-08-12 RX ADMIN — SUCRALFATE 1 G: 1 TABLET ORAL at 11:18

## 2023-08-12 RX ADMIN — PREDNISONE 40 MG: 20 TABLET ORAL at 08:10

## 2023-08-12 RX ADMIN — INSULIN LISPRO 1 UNITS: 100 INJECTION, SOLUTION INTRAVENOUS; SUBCUTANEOUS at 21:28

## 2023-08-12 RX ADMIN — PREGABALIN 200 MG: 100 CAPSULE ORAL at 16:35

## 2023-08-12 RX ADMIN — CEFTRIAXONE 2000 MG: 2 INJECTION, SOLUTION INTRAVENOUS at 04:12

## 2023-08-12 RX ADMIN — INSULIN LISPRO 1 UNITS: 100 INJECTION, SOLUTION INTRAVENOUS; SUBCUTANEOUS at 07:22

## 2023-08-12 NOTE — CONSULTS
Consultation - Urology   Lexy De eLon 69 y.o. female MRN: [id number]  Unit/Bed#: -01 Encounter: [id number]      Assessment/Plan      Assessment:  Gross hematuria with CT scan showing 1.4 cm left upper pole soft tissue lesion. Urine is anand to tea color currently. Plan:  Urine cytology pending. Discussed findings with the patient and recommended outpatient evaluation which will likely include cystoscopy with retrograde urogram, ureteroscopy and possible biopsy. She will require medical clearance prior to this procedure due to her current pulmonary exacerbation and elevated troponins. History of Present Illness   Attending: Constantino Da Silva MD  Reason for Consult / Principal Problem: Gross hematuria  HPI: Lexy De Leon is a 69y.o. year old female who presents with increasing weakness, shortness of breath as well as a 1 month history of gross hematuria. She describes the hematuria as a rust color intermittently with episodes of clearing. She denies any difficulty voiding or significant clots. She denies any dysuria, abdominal or flank pain. She does have a long history of tobacco use. She is currently admitted because of pulmonary exacerbation. She also has elevated troponins awaiting cardiology evaluation. Admission urinalysis shows anand urine with significant microhematuria. CT chest showed abnormal lung findings. CT abdomen showed a 1.4 cm soft tissue lesion in the upper pole of the left kidney. She denies any prior history of hematuria or kidney stones. She did have UTIs during 2 of her pregnancies. Inpatient consult to Urology  Consult performed by: Raquel New MD  Consult ordered by: Constantino Da Silva MD          Review of Systems   Gastrointestinal: Negative for abdominal distention and abdominal pain. Genitourinary: Positive for hematuria. Negative for difficulty urinating, dysuria and flank pain.        Historical Information   Past Medical History:   Diagnosis Date   • Anxiety    • Closed fracture of coccyx ([address]) 2023   • Diabetes mellitus ([address])    • Hypertension    • IBS (irritable bowel syndrome)    • Shoulder fracture      Past Surgical History:   Procedure Laterality Date   • ANKLE FRACTURE SURGERY Right     has screw in place   •  SECTION      x2   • CHOLECYSTECTOMY     • HERNIA REPAIR      umbilicus w/ mesh     Social History   Social History     Substance and Sexual Activity   Alcohol Use Not Currently     @DRUGHX  E-Cigarette/Vaping   • E-Cigarette Use Never User      E-Cigarette/Vaping Substances   • Nicotine No    • THC No    • CBD No    • Flavoring No    • Other No    • Unknown No      Social History     Tobacco Use   Smoking Status Every Day   • Packs/day: 1.00   • Years: 50.00   • Total pack years: 50.00   • Types: Cigarettes   • Start date:    Smokeless Tobacco Never   Tobacco Comments    on & off      Family History: non-contributory    Meds/Allergies   all current active meds have been reviewed  Allergies   Allergen Reactions   • Adhesive [Medical Tape]    • Latex    • Paroxetine    • Sulfa Antibiotics    • Sulfamethoxazole-Trimethoprim Hives       Objective   Vitals: Blood pressure 122/59, pulse 75, temperature (!) 97.4 °F (36.3 °C), temperature source Oral, resp. rate 18, height 5' 1" (1.549 m), weight 72 kg (158 lb 11.7 oz), SpO2 94 %. I/O last 24 hours: In: 18 [P.O.:1380; IV Piggyback:100]  Out: 950 [Urine:950]    Invasive Devices     Peripheral Intravenous Line  Duration           Peripheral IV 23 Left Antecubital 1 day    Peripheral IV 23 Right Antecubital 1 day                Physical Exam  Abdominal:      General: There is no distension. Palpations: Abdomen is soft. Tenderness: There is no abdominal tenderness. There is no right CVA tenderness or left CVA tenderness.          Lab Results:   CBC:   Lab Results   Component Value Date    WBC 10.14 2023    HGB 12.6 2023    HCT 37.4 2023 MCV 94 08/12/2023     08/12/2023    RBC 3.97 08/12/2023    MCH 31.7 08/12/2023    MCHC 33.7 08/12/2023    RDW 12.7 08/12/2023    MPV 10.1 08/12/2023     CMP:   Lab Results   Component Value Date    SODIUM 129 (L) 08/12/2023    CL 91 (L) 08/12/2023    CO2 33 (H) 08/12/2023    BUN 15 08/12/2023    CREATININE 0.59 (L) 08/12/2023    CALCIUM 9.1 08/12/2023    EGFR 88 08/12/2023     Urinalysis: No results found for: "COLORU", "CLARITYU", "SPECGRAV", "PHUR", "LEUKOCYTESUR", "NITRITE", "PROTEINUA", "GLUCOSEU", "Doron Sary", "BILIRUBINUR", "BLOODU"  Urine Culture: No results found for: "Rajan Paez"  Imaging Studies: I have personally reviewed pertinent reports. EKG, Pathology, and Other Studies: I have personally reviewed pertinent reports. VTE Prophylaxis: Sequential compression device Anh Alan)     Code Status: Level 3 - DNAR and DNI  Advance Directive and Living Will:      Power of :    POLST: Yes    Counseling / Coordination of Care  Total floor / unit time spent today 30 minutes. Greater than 50% of total time was spent with the patient and / or family counseling and / or coordination of care.  A description of the counseling / coordination of care: I discussed the case with the hospitalist.

## 2023-08-12 NOTE — ASSESSMENT & PLAN NOTE
· Has improved   · Highest oxygen requirement was 3.5 L of supplemental oxygen via nasal cannula, currently it is down to 2 L of supplemental oxygen   · Patient has greater than a 65-pack-year smoking history -but does not carry a formal diagnosis of COPD -current symptoms secondary to a suspected bronchitis  · Appreciate pulmonary input  · Continue respiratory protocol  · Continue prednisone 40 mg daily day #2  · Continue IV ceftriaxone day #2  · Flu/covid/rsv negative  · Patient will benefit from an exercise desat study prior to discharge

## 2023-08-12 NOTE — PLAN OF CARE
Problem: PAIN - ADULT  Goal: Verbalizes/displays adequate comfort level or baseline comfort level  Description: Interventions:  - Encourage patient to monitor pain and request assistance  - Assess pain using appropriate pain scale  - Administer analgesics based on type and severity of pain and evaluate response  - Implement non-pharmacological measures as appropriate and evaluate response  - Consider cultural and social influences on pain and pain management  - Notify physician/advanced practitioner if interventions unsuccessful or patient reports new pain  Outcome: Progressing     Problem: INFECTION - ADULT  Goal: Absence or prevention of progression during hospitalization  Description: INTERVENTIONS:  - Assess and monitor for signs and symptoms of infection  - Monitor lab/diagnostic results  - Monitor all insertion sites, i.e. indwelling lines, tubes, and drains  - Monitor endotracheal if appropriate and nasal secretions for changes in amount and color  - [place] appropriate cooling/warming therapies per order  - Administer medications as ordered  - Instruct and encourage patient and family to use good hand hygiene technique  - Identify and instruct in appropriate isolation precautions for identified infection/condition  Outcome: Progressing  Goal: Absence of fever/infection during neutropenic period  Description: INTERVENTIONS:  - Monitor WBC    Outcome: Progressing     Problem: SAFETY ADULT  Goal: Patient will remain free of falls  Description: INTERVENTIONS:  - Educate patient/family on patient safety including physical limitations  - Instruct patient to call for assistance with activity   - Consult OT/PT to assist with strengthening/mobility   - Keep Call bell within reach  - Keep bed low and locked with side rails adjusted as appropriate  - Keep care items and personal belongings within reach  - Initiate and maintain comfort rounds  - Make Fall Risk Sign visible to staff  - Offer Toileting every 2 Hours, in advance of need  - Initiate/Maintain bed alarm  - Apply yellow socks and bracelet for high fall risk patients  - Consider moving patient to room near nurses station  Outcome: Progressing  Goal: Maintain or return to baseline ADL function  Description: INTERVENTIONS:  -  Assess patient's ability to carry out ADLs; assess patient's baseline for ADL function and identify physical deficits which impact ability to perform ADLs (bathing, care of mouth/teeth, toileting, grooming, dressing, etc.)  - Assess/evaluate cause of self-care deficits   - Assess range of motion  - Assess patient's mobility; develop plan if impaired  - Assess patient's need for assistive devices and provide as appropriate  - Encourage maximum independence but intervene and supervise when necessary  - Involve family in performance of ADLs  - Assess for home care needs following discharge   - Consider OT consult to assist with ADL evaluation and planning for discharge  - Provide patient education as appropriate  Outcome: Progressing  Goal: Maintains/Returns to pre admission functional level  Description: INTERVENTIONS:  - Perform BMAT or MOVE assessment daily.   - Set and communicate daily mobility goal to care team and patient/family/caregiver. - Collaborate with rehabilitation services on mobility goals if consulted  - Perform Range of Motion 3 times a day. - Reposition patient every 2 hours.   - Dangle patient 3 times a day  - Stand patient 3 times a day  - Ambulate patient 3 times a day  - Out of bed to chair 3 times a day   - Out of bed for meals 3 times a day  - Out of bed for toileting  - Record patient progress and toleration of activity level   Outcome: Progressing     Problem: DISCHARGE PLANNING  Goal: Discharge to home or other facility with appropriate resources  Description: INTERVENTIONS:  - Identify barriers to discharge w/patient and caregiver  - Arrange for needed discharge resources and transportation as appropriate  - Identify discharge learning needs (meds, wound care, etc.)  - Arrange for interpretive services to assist at discharge as needed  - Refer to Case Management Department for coordinating discharge planning if the patient needs post-hospital services based on physician/advanced practitioner order or complex needs related to functional status, cognitive ability, or social support system  Outcome: Progressing     Problem: Knowledge Deficit  Goal: Patient/family/caregiver demonstrates understanding of disease process, treatment plan, medications, and discharge instructions  Description: Complete learning assessment and assess knowledge base.   Interventions:  - Provide teaching at level of understanding  - Provide teaching via preferred learning methods  Outcome: Progressing

## 2023-08-12 NOTE — PROGRESS NOTES
[address]  Progress Note  Name: Alexander Carbajal  MRN: [id number]  Unit/Bed#: -01 I Date of Admission: 8/11/2023   Date of Service: 8/12/2023 I Hospital Day: 1    Assessment/Plan   * Reactive airway disease with acute exacerbation  Assessment & Plan  · Has improved   · Highest oxygen requirement was 3.5 L of supplemental oxygen via nasal cannula, currently it is down to 2 L of supplemental oxygen   · Patient has greater than a 65-pack-year smoking history -but does not carry a formal diagnosis of COPD -current symptoms secondary to a suspected bronchitis  · Appreciate pulmonary input  · Continue respiratory protocol  · Continue prednisone 40 mg daily day #2  · Continue IV ceftriaxone day #2  · Flu/covid/rsv negative  · Patient will benefit from an exercise desat study prior to discharge    Elevated troponin  Assessment & Plan  · None myocardial injury/ischemia related elevated troponin secondary to acute illness/hypoxia   · Appreciate cardiology input  · Case reviewed with Dr. Nevin Collet- patient to remain in house until Monday, 8/14/2023 for 2D echocardiogram evaluation  · Patient denies any chest pain  · Continue current cardiac based medications include aspirin 81 mg p.o. daily, lisinopril 2.5 mg p.o. 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  · Status post a urology evaluation  · No further inpatient testing, treatment, and work-up is needed, urology will be following up in the near future in the outpatient setting for cystoscopy, and further management  · H&H is remained stable    Type 2 diabetes mellitus with diabetic polyneuropathy [facility])  Assessment & Plan  Lab Results   Component Value Date    HGBA1C 6.1 (H) 07/05/2023       Recent Labs     08/11/23  1200 08/11/23  1605 08/11/23  2117 08/12/23  0659   POCGLU 195* 262* 177* 152*       Blood Sugar Average: Last 72 hrs:  · (P) 189.8   · Oral hypoglycemic medications remain on hold  · Target blood sugar for the hospital is 140-180  · Currently on Accu-Cheks before meals and at bedtime with sliding scale coverage  · We will add basal Lantus  · Possibly worsened by steroids    Hypokalemia  Assessment & Plan  · Resolved  · Hyponatremia-chronic sodium stable    Anxiety and depression  Assessment & Plan  · Continue home medication with bupropion 300 mg daily and clonazepam daily at bedtime    GERD without esophagitis  Assessment & Plan  · Continue PPI    Hypomagnesemia  Assessment & Plan  · Resolved with repletion    Lactic acidosis  Assessment & Plan  · Resolved most recent lactic acid level is 1.7             VTE Prophylaxis:  Enoxaparin (Lovenox)    Patient Centered Rounds: I have performed bedside rounds with nursing staff today. Discussions with Specialists or Other Care Team Provider: Cardiology, pulmonary, urology, case management, nursing  Education and Discussions with Family / Patient: Patient was brought up to par    Current Length of Stay: 1 day(s)    Current Patient Status: Inpatient   Certification Statement: The patient will continue to require additional inpatient hospital stay due to Continued management of elevated troponins, the need for an echocardiogram prior to discharge    Discharge Plan: Hopeful discharge planning for Monday, 2023    Code Status: Level 3 - DNAR and DNI    Subjective:   Patient seen, reports feeling a lot better than prior to coming into the hospital, sitting on the edge of her bed, is a little upset, she wants to go home today because her daughter is leaving for [place], but understands reasons as to why she needs to stay    Objective:     Vitals:   Temp (24hrs), Av.6 °F (36.4 °C), Min:97.4 °F (36.3 °C), Max:97.7 °F (36.5 °C)    Temp:  [97.4 °F (36.3 °C)-97.7 °F (36.5 °C)] 97.4 °F (36.3 °C)  HR:  [70-75] 75  Resp:  [18] 18  BP: (122-129)/(57-64) 122/59  SpO2:  [78 %-96 %] 94 %  Body mass index is 29.99 kg/m².      Input and Output Summary (last 24 hours): Intake/Output Summary (Last 24 hours) at 8/12/2023 1112  Last data filed at 8/12/2023 0840  Gross per 24 hour   Intake 1480 ml   Output 950 ml   Net 530 ml       Physical Exam:   Physical Exam  Constitutional:       General: She is not in acute distress. Appearance: Normal appearance. She is normal weight. She is not ill-appearing. HENT:      Head: Normocephalic and atraumatic. Nose: Nose normal.      Mouth/Throat:      Mouth: Mucous membranes are moist.   Eyes:      Extraocular Movements: Extraocular movements intact. Pupils: Pupils are equal, round, and reactive to light. Cardiovascular:      Rate and Rhythm: Normal rate and regular rhythm. Pulses: Normal pulses. Heart sounds: Normal heart sounds. No murmur heard. No friction rub. No gallop. Pulmonary:      Effort: Pulmonary effort is normal. No respiratory distress. Breath sounds: Normal breath sounds. No wheezing, rhonchi or rales. Abdominal:      General: There is no distension. Palpations: Abdomen is soft. There is no mass. Tenderness: There is no abdominal tenderness. Hernia: No hernia is present. Musculoskeletal:         General: No swelling or tenderness. Normal range of motion. Cervical back: Normal range of motion and neck supple. No rigidity. Right lower leg: No edema. Left lower leg: No edema. Skin:     General: Skin is warm. Capillary Refill: Capillary refill takes less than 2 seconds. Findings: No erythema or rash. Neurological:      General: No focal deficit present. Mental Status: She is alert and oriented to person, place, and time. Mental status is at baseline. Cranial Nerves: No cranial nerve deficit. Motor: No weakness.    Psychiatric:         Mood and Affect: Mood normal.         Behavior: Behavior normal.         Additional Data:     Labs:    Results from last 7 days   Lab Units 08/12/23  3199 08/11/23  0025   WBC Thousand/uL 10.14 8.74   HEMOGLOBIN g/dL 12.6 15.5*   HEMATOCRIT % 37.4 45.3   PLATELETS Thousands/uL 199 172   LYMPHO PCT %  --  2*   MONO PCT %  --  1*   EOS PCT %  --  2     Results from last 7 days   Lab Units 08/12/23  0425 08/11/23  0859 08/11/23  0025   SODIUM mmol/L 129*   < > 135   POTASSIUM mmol/L 4.0   < > 2.6*   CHLORIDE mmol/L 91*   < > 92*   CO2 mmol/L 33*   < > 32   BUN mg/dL 15   < > 12   CREATININE mg/dL 0.59*   < > 0.59*   CALCIUM mg/dL 9.1   < > 9.8   ALK PHOS U/L  --   --  98   ALT U/L  --   --  21   AST U/L  --   --  62*    < > = values in this interval not displayed. Results from last 7 days   Lab Units 08/11/23  0025   INR  1.08     Results from last 7 days   Lab Units 08/12/23  0659 08/11/23  2117 08/11/23  1605 08/11/23  1200 08/11/23  0729   POC GLUCOSE mg/dl 152* 177* 262* 195* 163*           * I Have Reviewed All Lab Data Listed Above. * Additional Pertinent Lab Tests Reviewed: [address] [facility] [address] Admission  Reviewed    Imaging:  Imaging Reports Reviewed Today Include: None    Recent Cultures (last 7 days):     Results from last 7 days   Lab Units 08/11/23  0055 08/11/23  0025   BLOOD CULTURE  No Growth at 24 hrs. No Growth at 24 hrs.        Last 24 Hours Medication List:   Current Facility-Administered Medications   Medication Dose Route Frequency Provider Last Rate   • acetaminophen  650 mg Oral Q4H PRN Lesley Chowdhury PA-C     • albuterol  2.5 mg Nebulization Q4H PRN Constantino Da Silva MD     • aspirin  81 mg Oral Daily Carolin Zuluaga     • atorvastatin  10 mg Oral Daily With Lion Christopher DO     • buPROPion  300 mg Oral Daily Lesley Chowdhury PA-C     • cefTRIAXone  2,000 mg Intravenous Q24H Lesley Chowdhury PA-C 2,000 mg (08/12/23 0412)   • clonazePAM  1 mg Oral HS Lesley Chowdhury PA-C     • enoxaparin  40 mg Subcutaneous Daily Lesley Chowdhury PA-C     • fish oil  1,000 mg Oral Daily Ирина Dobbins PA-C     • insulin glargine  10 Units Subcutaneous HS Yumiko Lawrence MD     • insulin lispro  1-5 Units Subcutaneous TID AC Ирина Dobbins PA-C     • insulin lispro  1-5 Units Subcutaneous HS Ирина Dobbins PA-C     • lisinopril  2.5 mg Oral Daily Bianca Martinez DO     • nicotine  1 patch Transdermal Daily Ирина Dobbins PA-C     • ondansetron  4 mg Intravenous Q6H PRN Ирина Dobbins PA-C     • pantoprazole  40 mg Oral Daily Ирина Dobbins PA-C     • potassium chloride  20 mEq Oral Daily With Breakfast Ирина Dobbins PA-C     • predniSONE  40 mg Oral Daily Ирина Dobbins PA-C     • pregabalin  200 mg Oral TID Ирина Dobbins PA-C     • sucralfate  1 g Oral 4x Daily Ирина Dobbins PA-C          Today, Patient Was Seen By: Yumiko Lawrence MD    ** Please Note: Dictation voice to text software may have been used in the creation of this document.  **

## 2023-08-12 NOTE — ASSESSMENT & PLAN NOTE
· None myocardial injury/ischemia related elevated troponin secondary to acute illness/hypoxia   · Appreciate cardiology input  · Case reviewed with Dr. Charissa Spencer- patient to remain in house until Monday, 8/14/2023 for 2D echocardiogram evaluation  · Patient denies any chest pain  · Continue current cardiac based medications include aspirin 81 mg p.o. daily, lisinopril 2.5 mg p.o. daily and atorvastatin 10 mg daily

## 2023-08-12 NOTE — ASSESSMENT & PLAN NOTE
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

## 2023-08-12 NOTE — ASSESSMENT & PLAN NOTE
Lab Results   Component Value Date    HGBA1C 6.1 (H) 07/05/2023       Recent Labs     08/11/23  1200 08/11/23  1605 08/11/23  2117 08/12/23  0659   POCGLU 195* 262* 177* 152*       Blood Sugar Average: Last 72 hrs:  · (P) 189.8   · Oral hypoglycemic medications remain on hold  · Target blood sugar for the hospital is 140-180  · Currently on Accu-Cheks before meals and at bedtime with sliding scale coverage  · We will add basal Lantus  · Possibly worsened by steroids

## 2023-08-12 NOTE — ASSESSMENT & PLAN NOTE
- Possibly related to acute illness and hypoxia present on admission with lactic acidosis and significant electrolyte abnormalities  -We will check transthoracic echocardiogram to evaluate overall cardiac structure and function  -After discussion patient is agreeable to medical therapy but would not want any invasive or aggressive treatments or testing.  -Patient denies active symptoms at this time  -We will attempt to initiate medical therapy with atorvastatin lisinopril and once improved from respiratory status initiation of beta-blocker therapy

## 2023-08-12 NOTE — PROGRESS NOTES
Progress Note - Pulmonary   Surya Ashton 69 y.o. female MRN: [id number]  Unit/Bed#: -01 Encounter: [id number]    Assessment:  Acute hypoxemic respiratory failure- now 93--97% on room air- could have had some component of exacerbation of underlying undiagnosed airways disease- no PFTs. Had some nocturnal hypoxemia last evening. 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 She has no prior imaging to review. No relevant occupational/environmental factors. Tobacco use disorder     Recommendations:  Wean off O2 for SPO2 >88%  OK to continue prednisone for 5 days given presentation per H/P .  PRN albuterol. She reports as outpatient she improved a bit when taking Trelegy but this was too expensive  She does have some peripheral eosinophilia. . I think Breztri or Trelegy would be reasonable for discharge- or else an ICS/LABA. Also PRN albuterol. Will need eventually to get outpatient PFTs. Would treat with antibiotics for 5 days- currently ceftriaxone  Recommend outpatient CT chest in 2 months to reevaluate the nodules and follow-up with me in the office  Urology evaluation ongoing- planned cystoscopy as an outpatient    Regarding pulmonary risk stratification for this procedure. She overall is in no respiratory distress and is on room air. She is being treated with prednisone and bronchodilators for possible exacerbation of reactive airways disease vs COPD but now appears to be at baseline  Her ARISCAT score is acceptable. She can proceed to get a cystoscopy without further testing from a pulmonary perspective.       Subjective:   Patient feels well today. Is 93-97% on room air when I am speaking with her. Some coughing overnight.  No dyspnea    Objective:     Vitals: Blood pressure 122/59, pulse 75, temperature (!) 97.4 °F (36.3 °C), temperature source Oral, resp. rate 18, height 5' 1" (1.549 m), weight 72 kg (158 lb 11.7 oz), SpO2 94 %. ,Body mass index is 29.99 kg/m². Intake/Output Summary (Last 24 hours) at 8/12/2023 1241  Last data filed at 8/12/2023 1215  Gross per 24 hour   Intake 1720 ml   Output 950 ml   Net 770 ml       Invasive Devices     Peripheral Intravenous Line  Duration           Peripheral IV 08/11/23 Left Antecubital 1 day    Peripheral IV 08/11/23 Right Antecubital 1 day              Vitals:    08/12/23 1100   BP: 122/59   Pulse: 75   Resp: 18   Temp: (!) 97.4 °F (36.3 °C)   SpO2: 94%     General:  Patient is awake, alert, non-toxic and in no acute respiratory distress  Eyes: PERRL, no scleral icterus  Neck: No JVD  CV:  Regular, +S1 and S2, No murmurs, gallops or rubs appreciated  Lungs: Mild wheeze  Abdomen: Soft, +BS, Non-tender, non-distended  Extremities: No clubbing, cyanosis or edema  Neuro: No focal motor/sensory deficits  Skin: Warm, dry      Labs: I have personally reviewed pertinent lab results. Imaging and other studies: I have personally reviewed pertinent reports.    and I have personally reviewed pertinent films in PACS

## 2023-08-12 NOTE — CONSULTS
[address]  Consult  Name: Darlyn Millan 69 y.o. female I MRN: [id number]  Unit/Bed#: -01 I Date of Admission: 8/11/2023   Date of Service: 8/12/2023 I Hospital Day: 1    Inpatient consult to Cardiology  Consult performed by: Daryle Grate, DO  Consult ordered by:  Davy Mackenzie MD          Assessment/Plan   Hypomagnesemia  Assessment & Plan  - Continue to monitor  -Replete to goal 2.0    Elevated troponin  Assessment & Plan  - Possibly related to acute illness and hypoxia present on admission with lactic acidosis and significant electrolyte abnormalities  -We will check transthoracic echocardiogram to evaluate overall cardiac structure and function  -After discussion patient is agreeable to medical therapy but would not want any invasive or aggressive treatments or testing.  -Patient denies active symptoms at this time  -We will attempt to initiate medical therapy with atorvastatin lisinopril and once improved from respiratory status initiation of beta-blocker therapy    Hypokalemia  Assessment & Plan  - Continue to monitor  -Replete to goal potassium 4.0    Type 2 diabetes mellitus with diabetic polyneuropathy [facility])  Assessment & Plan  Lab Results   Component Value Date    HGBA1C 6.1 (H) 07/05/2023       Recent Labs     08/11/23  1200 08/11/23  1605 08/11/23  2117 08/12/23  0659   POCGLU 195* 262* 177* 152*       Blood Sugar Average: Last 72 hrs:  (P) 189.8     -Plan of care per primary team    * Reactive airway disease with acute exacerbation  Assessment & Plan  - Plan of care per primary team and pulmonology    Other summary comments:   -Troponin elevation seen possibly in the setting of acute illness with hypoxia and COPD exacerbation on admission  -As patient does have coronary calcifications present on CT imaging will initiate atorvastatin beginning at 10 mg daily with up titration as patient tolerates and will recheck CMP tomorrow as AST was slightly elevated on admission  -Patient notes after discussion she would not want any invasive or aggressive treatments or therapies including but not limited to cardiac catheterization.  -In that setting she is agreeable to medical therapy and we will have her undergo transthoracic echocardiogram to assist with medical management.  -Patient counseled on dietary lifestyle modifications including tobacco cessation  -As patient appears reasonably stable will initiate low-dose lisinopril therapy at 2.5 mg daily and once improved from respiratory standpoint we will likely initiate beta-blocker therapy at that time  -Continue to monitor patient clinically. Outpatient Cardiologist: W[facility]     HPI: Katiuska Walls is a 69y.o. year old female COPD, diabetes mellitus, current tobacco use approximately 1.5 packs/day, obesity, hypertension and GERD who presented to [address] [place] [address] [place] 8/11/2023 for fatigue nausea and vomiting fevers and chills. Patient had been at a family reunion earlier in the week and shortly thereafter began to develop significant symptoms of diarrhea and then nausea leading then into fevers and chills for the preceding 3 days prior to her hospitalization. When she became somewhat confused this concerned her daughter and brought mother to the hospital for further evaluation. Patient also notes that she has been having a rust colored urine issue for several weeks at this time. On admission patient has significant electrolyte abnormalities with hypokalemia, hypomagnesemia, lactic acidosis and elevated procalcitonin level. She was also found to have hematuria with 3+ blood and urinalysis  -Currently patient denies any chest pain, palpitations, lightness or dizziness, loss of consciousness and notes her shortness of breath is improved with nasal cannula oxygen therapy.   She states her abdominal issues have also been improving and overall feels much better. She states at home while she does smoke and can sometimes get short winded with exertion denies that this is a significant issue for her and still performs her activities of daily living and never had issues with chest pain or palpitations. EKG:   -Sinus rhythm with PVCs and bigeminy pattern, right axis deviation with septal infarct age-indeterminate nonspecific ST/T wave abnormalities. Review of Systems:   Review of Systems   Constitutional: Negative for chills, diaphoresis, fatigue and fever. HENT: Negative for trouble swallowing and voice change. Eyes: Negative for pain and redness. Respiratory: Positive for cough and wheezing. Negative for shortness of breath. Cardiovascular: Negative for chest pain, palpitations and leg swelling. Gastrointestinal: Negative for abdominal pain, constipation, diarrhea, nausea and vomiting. Genitourinary: Positive for hematuria. Musculoskeletal: Positive for arthralgias. Negative for neck pain and neck stiffness. Skin: Negative for rash. Neurological: Negative for dizziness, syncope, light-headedness and headaches. Psychiatric/Behavioral: Negative for agitation and confusion. All other systems reviewed and are negative.         Historical Information   Past Medical History:   Diagnosis Date   • Anxiety    • Closed fracture of coccyx ([address]) 2023   • Diabetes mellitus ([address])    • Hypertension    • IBS (irritable bowel syndrome)    • Shoulder fracture      Past Surgical History:   Procedure Laterality Date   • ANKLE FRACTURE SURGERY Right     has screw in place   •  SECTION      x2   • CHOLECYSTECTOMY     • HERNIA REPAIR      umbilicus w/ mesh     Social History     Substance and Sexual Activity   Alcohol Use Not Currently     Social History     Substance and Sexual Activity   Drug Use Never     Social History     Tobacco Use   Smoking Status Every Day   • Packs/day: 1.00   • Years: 50.00   • Total pack years: 50.00   • Types: Cigarettes   • Start date: 1961   Smokeless Tobacco Never   Tobacco Comments    on & off        Family History:   Family History   Problem Relation Age of Onset   • COPD Mother    • Emphysema Mother    • COPD Father    • Emphysema Father        Meds/Allergies   all current active meds have been reviewed  Medications Prior to Admission   Medication   • acetaminophen (TYLENOL) 325 mg tablet   • Aspirin (ASPIR-81 PO)   • Blood Glucose Monitoring Suppl (ONETOUCH VERIO) w/Device KIT   • buPROPion (WELLBUTRIN XL) 300 mg 24 hr tablet   • clonazePAM (KlonoPIN) 1 mg tablet   • glucose blood (OneTouch Verio) test strip   • hydrochlorothiazide (HYDRODIURIL) 50 mg tablet   • Januvia 100 MG tablet   • Lancets (OneTouch Delica Plus FLSPTR19D) MISC   • metFORMIN (GLUCOPHAGE-XR) 500 mg 24 hr tablet   • Misc. Devices (Carex Coccyx Cushion) MISC   • Omega-3 Fatty Acids (FISH OIL PO)   • pantoprazole (PROTONIX) 40 mg tablet   • potassium chloride (Klor-Con) 10 mEq tablet   • pregabalin (LYRICA) 200 MG capsule   • solifenacin (VESICARE) 5 mg tablet   • sucralfate (CARAFATE) 1 g tablet       Allergies   Allergen Reactions   • Adhesive [Medical Tape]    • Latex    • Paroxetine    • Sulfa Antibiotics    • Sulfamethoxazole-Trimethoprim Hives       Objective   Vitals: Blood pressure 122/59, pulse 75, temperature (!) 97.4 °F (36.3 °C), temperature source Oral, resp. rate 18, height 5' 1" (1.549 m), weight 72 kg (158 lb 11.7 oz), SpO2 94 %. , Body mass index is 29.99 kg/m².,   Orthostatic Blood Pressures    Flowsheet Row Most Recent Value   Blood Pressure 122/59 filed at 08/12/2023 0701   Patient Position - Orthostatic VS Lying filed at 08/12/2023 0180          Systolic (13ZSR), GXB:988 , Min:110 , GLU:841     Diastolic (13KCU), QXI:22, Min:46, Max:64      Physical Exam:  Physical Exam  Vitals reviewed. Constitutional:       General: She is not in acute distress. Appearance: She is obese. HENT:      Head: Normocephalic and atraumatic. Comments: Nasal cannula oxygen in place  Eyes:      General:         Right eye: No discharge. Left eye: No discharge. Neck:      Comments: Trachea midline, neck obese, difficult to assess JVD  Cardiovascular:      Rate and Rhythm: Normal rate and regular rhythm. Heart sounds: No friction rub. Pulmonary:      Effort: No respiratory distress. Breath sounds: Wheezing present. Comments: Decreased breath sounds bilaterally  Chest:      Chest wall: No tenderness. Abdominal:      General: Bowel sounds are normal.      Palpations: Abdomen is soft. Tenderness: There is no abdominal tenderness. There is no rebound. Musculoskeletal:      Right lower leg: No edema. Left lower leg: No edema. Skin:     General: Skin is warm and dry. Neurological:      Mental Status: She is alert. Comments: Awake, alert, able to answer questions appropriately, able to move extremities bilaterally.    Psychiatric:         Mood and Affect: Mood normal.         Behavior: Behavior normal.         Lab Results:     Troponins:    Results from last 7 days   Lab Units 08/11/23  0430 08/11/23  0251 08/11/23  0025   HS TNI 0HR ng/L  --   --  33   HS TNI 2HR ng/L  --  107*  --    HSTNI D2 ng/L  --  74*  --    HS TNI 4HR ng/L 129*  --   --    HSTNI D4 ng/L 96*  --   --      BNP:       CBC :   Results from last 7 days   Lab Units 08/12/23  0425 08/11/23  0025   WBC Thousand/uL 10.14 8.74   HEMOGLOBIN g/dL 12.6 15.5*   HEMATOCRIT % 37.4 45.3   MCV fL 94 93   PLATELETS Thousands/uL 199 172     TSH:     CMP:   Results from last 7 days   Lab Units 08/12/23  0425 08/11/23  0859 08/11/23  0025   POTASSIUM mmol/L 4.0 3.4* 2.6*   CHLORIDE mmol/L 91* 90* 92*   CO2 mmol/L 33* 31 32   BUN mg/dL 15 12 12   CREATININE mg/dL 0.59* 0.52* 0.59*   AST U/L  --   --  62*   ALT U/L  --   --  21   EGFR ml/min/1.73sq m 88 92 88     Lipid Profile:     Coags:   Results from last 7 days   Lab Units 08/11/23  0025   INR  1.08

## 2023-08-12 NOTE — PLAN OF CARE
Problem: SAFETY ADULT  Goal: Patient will remain free of falls  Description: INTERVENTIONS:  - Educate patient/family on patient safety including physical limitations  - Instruct patient to call for assistance with activity   - Consult OT/PT to assist with strengthening/mobility   - Keep Call bell within reach  - Keep bed low and locked with side rails adjusted as appropriate  - Keep care items and personal belongings within reach  - Initiate and maintain comfort rounds  - Make Fall Risk Sign visible to staff  - Offer Toileting every 2 Hours, in advance of need  - Initiate/Maintain bed alarm  - Obtain necessary fall risk management equipment: non skid socks  - Apply yellow socks and bracelet for high fall risk patients  - Consider moving patient to room near nurses station  8/12/2023 0903 by Selvin Zaragoza RN  Outcome: Progressing  8/12/2023 0903 by Selvin Zaragoza, RN  Outcome: Progressing

## 2023-08-12 NOTE — ASSESSMENT & PLAN NOTE
· Resolved most recent lactic acid level is 1.7 Home Suture Removal Text: Patient was provided a home suture removal kit and will remove their sutures at home.  If they have any questions or difficulties they will call the office.

## 2023-08-13 LAB
ANION GAP SERPL CALCULATED.3IONS-SCNC: 6 MMOL/L
BASOPHILS # BLD AUTO: 0.01 THOUSANDS/ÂΜL (ref 0–0.1)
BASOPHILS NFR BLD AUTO: 0 % (ref 0–1)
BUN SERPL-MCNC: 11 MG/DL (ref 5–25)
CALCIUM SERPL-MCNC: 9.7 MG/DL (ref 8.4–10.2)
CHLORIDE SERPL-SCNC: 97 MMOL/L (ref 96–108)
CO2 SERPL-SCNC: 33 MMOL/L (ref 21–32)
CREAT SERPL-MCNC: 0.47 MG/DL (ref 0.6–1.3)
EOSINOPHIL # BLD AUTO: 0.33 THOUSAND/ÂΜL (ref 0–0.61)
EOSINOPHIL NFR BLD AUTO: 4 % (ref 0–6)
ERYTHROCYTE [DISTWIDTH] IN BLOOD BY AUTOMATED COUNT: 12.6 % (ref 11.6–15.1)
GFR SERPL CREATININE-BSD FRML MDRD: 95 ML/MIN/1.73SQ M
GLUCOSE SERPL-MCNC: 128 MG/DL (ref 65–140)
GLUCOSE SERPL-MCNC: 136 MG/DL (ref 65–140)
GLUCOSE SERPL-MCNC: 155 MG/DL (ref 65–140)
GLUCOSE SERPL-MCNC: 171 MG/DL (ref 65–140)
GLUCOSE SERPL-MCNC: 286 MG/DL (ref 65–140)
HCT VFR BLD AUTO: 36.8 % (ref 34.8–46.1)
HGB BLD-MCNC: 12.4 G/DL (ref 11.5–15.4)
IMM GRANULOCYTES # BLD AUTO: 0.03 THOUSAND/UL (ref 0–0.2)
IMM GRANULOCYTES NFR BLD AUTO: 0 % (ref 0–2)
LYMPHOCYTES # BLD AUTO: 2.25 THOUSANDS/ÂΜL (ref 0.6–4.47)
LYMPHOCYTES NFR BLD AUTO: 25 % (ref 14–44)
MCH RBC QN AUTO: 31.7 PG (ref 26.8–34.3)
MCHC RBC AUTO-ENTMCNC: 33.7 G/DL (ref 31.4–37.4)
MCV RBC AUTO: 94 FL (ref 82–98)
MONOCYTES # BLD AUTO: 0.5 THOUSAND/ÂΜL (ref 0.17–1.22)
MONOCYTES NFR BLD AUTO: 6 % (ref 4–12)
NEUTROPHILS # BLD AUTO: 5.8 THOUSANDS/ÂΜL (ref 1.85–7.62)
NEUTS SEG NFR BLD AUTO: 65 % (ref 43–75)
NRBC BLD AUTO-RTO: 0 /100 WBCS
PLATELET # BLD AUTO: 274 THOUSANDS/UL (ref 149–390)
PMV BLD AUTO: 9.8 FL (ref 8.9–12.7)
POTASSIUM SERPL-SCNC: 3.9 MMOL/L (ref 3.5–5.3)
RBC # BLD AUTO: 3.91 MILLION/UL (ref 3.81–5.12)
SODIUM SERPL-SCNC: 136 MMOL/L (ref 135–147)
WBC # BLD AUTO: 8.92 THOUSAND/UL (ref 4.31–10.16)

## 2023-08-13 PROCEDURE — 80048 BASIC METABOLIC PNL TOTAL CA: CPT | Performed by: HOSPITALIST

## 2023-08-13 PROCEDURE — 82948 REAGENT STRIP/BLOOD GLUCOSE: CPT

## 2023-08-13 PROCEDURE — 99232 SBSQ HOSP IP/OBS MODERATE 35: CPT | Performed by: HOSPITALIST

## 2023-08-13 PROCEDURE — 85025 COMPLETE CBC W/AUTO DIFF WBC: CPT | Performed by: HOSPITALIST

## 2023-08-13 PROCEDURE — 99232 SBSQ HOSP IP/OBS MODERATE 35: CPT | Performed by: INTERNAL MEDICINE

## 2023-08-13 RX ADMIN — INSULIN LISPRO 1 UNITS: 100 INJECTION, SOLUTION INTRAVENOUS; SUBCUTANEOUS at 21:25

## 2023-08-13 RX ADMIN — INSULIN GLARGINE 10 UNITS: 100 INJECTION, SOLUTION SUBCUTANEOUS at 21:25

## 2023-08-13 RX ADMIN — PREGABALIN 200 MG: 100 CAPSULE ORAL at 08:24

## 2023-08-13 RX ADMIN — PREDNISONE 40 MG: 20 TABLET ORAL at 08:24

## 2023-08-13 RX ADMIN — SUCRALFATE 1 G: 1 TABLET ORAL at 17:07

## 2023-08-13 RX ADMIN — CEFTRIAXONE 2000 MG: 2 INJECTION, SOLUTION INTRAVENOUS at 05:26

## 2023-08-13 RX ADMIN — INSULIN LISPRO 3 UNITS: 100 INJECTION, SOLUTION INTRAVENOUS; SUBCUTANEOUS at 16:25

## 2023-08-13 RX ADMIN — BUPROPION HYDROCHLORIDE 300 MG: 150 TABLET, EXTENDED RELEASE ORAL at 08:24

## 2023-08-13 RX ADMIN — OMEGA-3 FATTY ACIDS CAP 1000 MG 1000 MG: 1000 CAP at 08:24

## 2023-08-13 RX ADMIN — PREGABALIN 200 MG: 100 CAPSULE ORAL at 21:24

## 2023-08-13 RX ADMIN — SUCRALFATE 1 G: 1 TABLET ORAL at 08:24

## 2023-08-13 RX ADMIN — PANTOPRAZOLE SODIUM 40 MG: 40 TABLET, DELAYED RELEASE ORAL at 05:26

## 2023-08-13 RX ADMIN — SUCRALFATE 1 G: 1 TABLET ORAL at 21:25

## 2023-08-13 RX ADMIN — LISINOPRIL 2.5 MG: 2.5 TABLET ORAL at 08:24

## 2023-08-13 RX ADMIN — ASPIRIN 81 MG CHEWABLE TABLET 81 MG: 81 TABLET CHEWABLE at 08:24

## 2023-08-13 RX ADMIN — PREGABALIN 200 MG: 100 CAPSULE ORAL at 16:25

## 2023-08-13 RX ADMIN — SUCRALFATE 1 G: 1 TABLET ORAL at 11:42

## 2023-08-13 RX ADMIN — ATORVASTATIN CALCIUM 10 MG: 10 TABLET, FILM COATED ORAL at 16:25

## 2023-08-13 RX ADMIN — POTASSIUM CHLORIDE 20 MEQ: 750 TABLET, EXTENDED RELEASE ORAL at 08:24

## 2023-08-13 RX ADMIN — INSULIN LISPRO 1 UNITS: 100 INJECTION, SOLUTION INTRAVENOUS; SUBCUTANEOUS at 11:27

## 2023-08-13 RX ADMIN — CLONAZEPAM 1 MG: 1 TABLET ORAL at 21:25

## 2023-08-13 RX ADMIN — ENOXAPARIN SODIUM 40 MG: 40 INJECTION SUBCUTANEOUS at 08:24

## 2023-08-13 NOTE — ASSESSMENT & PLAN NOTE
· None myocardial injury/ischemia related elevated troponin secondary to acute illness/hypoxia   · Appreciate cardiology input  · Patient remains asymptomatic from a cardiac standpoint-denies any chest pain  · Case reviewed with Dr. Diane Ness- patient to remain in house until Monday, 8/14/2023 for 2D echocardiogram evaluation  · Continue current cardiac based medications include aspirin 81 mg p.o. daily, lisinopril 2.5 mg p.o. daily and atorvastatin 10 mg daily

## 2023-08-13 NOTE — UTILIZATION REVIEW
Date: 8/13    Day 3: Has surpassed a 2nd midnight with active treatments and services, which include tx COPD, hematuria, poss on IV antibiotics, PO Prednisone, oxygen. Initial Clinical Review    Admission: Date/Time/Statement:   Admission Orders (From admission, onward)     Ordered        08/11/23 0416  INPATIENT ADMISSION  Once                      Orders Placed This Encounter   Procedures   • INPATIENT ADMISSION     Standing Status:   Standing     Number of Occurrences:   1     Order Specific Question:   Level of Care     Answer:   Med Surg [16]     Order Specific Question:   Bed request comments     Answer:   with tele     Order Specific Question:   Estimated length of stay     Answer:   More than 2 Midnights     Order Specific Question:   Certification     Answer:   I certify that inpatient services are medically necessary for this patient for a duration of greater than two midnights. See H&P and MD Progress Notes for additional information about the patient's course of treatment. ED Arrival Information     Expected   -    Arrival   8/11/2023 00:03    Acuity   Urgent            Means of arrival   Ambulance    Escorted by   [facility]    Service   Hospitalist    Admission type   Emergency            Arrival complaint   uti           Chief Complaint   Patient presents with   • Medical Problem     Not feeling well all week/ chills/unable to eat       Initial Presentation: 69 y.o. female presents to the ED via EMS from home with c/o urinating blood, fever, mild confusion, weakness, increased fatigue w/ sleeping, chills, shivering, sweating when covered up, SOB, diarrhea during the week x 2 days. Had completed 2 rounds antibiotics in last several weeks. PMH: COPD, NIDDM, HTN, GERD, anxiety, depression. In the ED she is febrile and tachycardic. Labs - elevated troponins, procal, lactic acid, Abnormal UA, low K 2.6.   Imaging - extensive upper lungs subpleural perilymphatic reticulonodular densities, small consolidation RLL, enlarged precarinal lymph node; poss upper pole urothelial tumor. Treated with IV fluids, IV KCl, DuoNeb, ASA, IV antibiotics. On exam tachycardic, decreased breath sounds, wheezing, weakness. She is admitted to INPATIENT status with Acute exac COPD - Prednisone, IV antibiotics, chest CT, pulmonary consult. Hematuria - suspicion for renal cell CA high, urology consult. Lactic acidosis - IV fluids, trend til WNL. Hypomagnesemia, Hypokalemia - replete, trend. Elevated troponins - Cardio consult    8/11 Pulmonary Consult - acute hypoxic resp failure - wean off oxygen, poss exac COPD - undiagnosed, no PFTs. Abnormal Chest CT. Continue Prednisone x 5 days, PRN Albuterol. RP2 panel, sputum culture, OP PFTs, PRN Albuterol on d/c, CT Chest OP in 2 months. On exam wheezing. Continue Steroids, antibiotics. Date: 8/12   Day 2:   Pt to continue with IV antibiotics, Prednisone, wean oxygen, exercise desat study prior to d/c. H/H stable, lytes WNL. On exam no c/o CP, feels improvement, requesting to d/c to home. Lungs clear. 8/12 Urology Consult - CT scan showing 1.4 cm left upper pole soft tissue lesion. Urine is anand to tea color currently. Will need OP cystoscopy with retrograde urogram, ureteroscopy and possible biopsy. On exam no CVA tenderness, abd distention. 8/12 Cardio Consult - reactive airway disease w/ acute exac, elevated troponins, hypokalemia, hypomagnesemia - replete lytes, troponins could be d/t acute illness, will get TTE, pt does not want aggressive tx or testing. On exam no acute complaints. Will start statin, Lisinopril, BB therapy when resp status improves. On exam + wheezing, on oxygen, RRR cardiac status.       ED Triage Vitals   Temperature Pulse Respirations Blood Pressure SpO2   08/11/23 0008 08/11/23 0008 08/11/23 0008 08/11/23 0008 08/11/23 0008   100.3 °F (37.9 °C) (!) 107 16 135/63 92 %      Temp Source Heart Rate Source Patient Position - Orthostatic VS BP Location FiO2 (%)   08/11/23 0008 08/11/23 0008 08/11/23 0008 08/11/23 0008 --   Oral Monitor Sitting Left arm       Pain Score       08/11/23 0635       No Pain          Wt Readings from Last 1 Encounters:   08/11/23 72 kg (158 lb 11.7 oz)     Additional Vital Signs:   08/13/23 07:31:17 98.1 °F (36.7 °C) 64 20 145/68 94 94 % -- -- None (Room air) Sitting   08/12/23 22:37:51 98.3 °F (36.8 °C) 70 17 118/60 79 93 % -- -- None (Room air) --   08/12/23 15:05:55 97.6 °F (36.4 °C) 77 18 141/59 86 87 % Abnormal  -- -- -- --   08/12/23 1100 97.4 °F (36.3 °C) Abnormal  75 18 122/59 80 94 % -- -- Nasal cannula Sitting   08/12/23 0701 97.4 °F (36.3 °C) Abnormal  75 18 122/59 80 94 % -- -- -- Lying   08/11/23 2325 -- -- -- -- -- 96 % 28 2 L/min Nasal cannula --   08/11/23 2320 -- -- -- -- -- 78 % Abnormal  -- -- None (Room air) --   08/11/23 22:28:30 97.7 °F (36.5 °C) 75 18 129/64 86 90 % -- -- -- --   08/11/23 15:21:25 97.7 °F (36.5 °C) 70 18 122/57 79 94 % -- -- -- --   08/11/23 11:10:31 97.8 °F (36.6 °C) 72 18 110/46 Abnormal  67 92 % -- -- -- --   08/11/23 0746 -- -- -- -- -- 91 % 30 2.5 L/min Nasal cannula --   08/11/23 07:31:41 98.4 °F (36.9 °C) 87 19 117/40 Abnormal  66 89 % Abnormal  -- -- -- --   08/11/23 05:44:54 99.1 °F (37.3 °C) 81 18 112/52 72 92 % -- -- Nasal cannula Lying   08/11/23 0500 -- 104 16 -- -- 92 % 34 3.5 L/min Nasal cannula --   08/11/23 0300 -- 100 20 143/66 95 88 % Abnormal  -- -- -- --   08/11/23 0230 -- 99 20 135/61 88 90 % -- -- -- --   08/11/23 0224 -- -- -- -- -- -- -- -- Nasal cannula --   08/11/23 0200 -- 100 -- 147/65 94 92 % -- -- -- --   08/11/23 0131 -- 101 16 -- -- 93 % 34 3.5 L/min Nasal cannula --     Pertinent Labs/Diagnostic Test Results:       CT chest wo contrast   Final Result by Cee Diaz MD (08/11 1152)   Extensive predominantly subpleural perilymphatic reticulonodular densities, predominantly upper lung zones, likely infectious or inflammatory. Small consolidation in the subpleural right lower lobe. Consider follow-up. Coronary artery calcifications. Precarinal lymph node, mildly enlarged, possibly reactive. Chronic findings, as per the body of the report. Workstation performed: [id number]         CT renal protocol   Final Result by Yola Guerra MD (08/11 0545)      1.4 cm nodular soft tissue in the upper pole collecting system of the left kidney raising the question of a urothelial tumor. Suggest urology consultation. Mildly enlarged celiac axis and portacaval lymph nodes, which are nonspecific. No retroperitoneal adenopathy. The study was marked in [facility] for immediate notification. Workstation performed: [id number]         XR chest portable   ED Interpretation by Marika Vaughan.DO (08/11 8211)   Mild pulmonary vascular congestion noted. Final Result by Cheo Garcia MD (80/68 1192)      Increased interstitial lung markings. Differential includes emphysema, mild interstitial pulmonary edema, atypical pneumonia. The study was marked in [facility] for immediate notification.                      Workstation performed: [id number]           Results from last 7 days   Lab Units 08/11/23  0430   SARS-COV-2  Negative     Results from last 7 days   Lab Units 08/13/23  0531 08/12/23  0425 08/11/23  0025   WBC Thousand/uL 8.92 10.14 8.74   HEMOGLOBIN g/dL 12.4 12.6 15.5*   HEMATOCRIT % 36.8 37.4 45.3   PLATELETS Thousands/uL 274 199 172   NEUTROS ABS Thousands/µL 5.80  --   --    BANDS PCT %  --   --  9*         Results from last 7 days   Lab Units 08/13/23  0531 08/12/23  0425 08/11/23  0859 08/11/23  0025   SODIUM mmol/L 136 129* 130* 135   POTASSIUM mmol/L 3.9 4.0 3.4* 2.6*   CHLORIDE mmol/L 97 91* 90* 92*   CO2 mmol/L 33* 33* 31 32   ANION GAP mmol/L 6 5 9 11   BUN mg/dL 11 15 12 12   CREATININE mg/dL 0.47* 0.59* 0.52* 0.59*   EGFR ml/min/1.73sq m 95 88 92 88   CALCIUM mg/dL 9.7 9.1 9.1 9.8   MAGNESIUM mg/dL  --  2.2 2.1 1.0*     Results from last 7 days   Lab Units 08/11/23  0025   AST U/L 62*   ALT U/L 21   ALK PHOS U/L 98   TOTAL PROTEIN g/dL 6.6   ALBUMIN g/dL 3.9   TOTAL BILIRUBIN mg/dL 0.84     Results from last 7 days   Lab Units 08/13/23  0740 08/12/23  2122 08/12/23  1617 08/12/23  1119 08/12/23  0659 08/11/23  2117 08/11/23  1605 08/11/23  1200 08/11/23  0729   POC GLUCOSE mg/dl 136 178* 264* 227* 152* 177* 262* 195* 163*     Results from last 7 days   Lab Units 08/13/23  0531 08/12/23  0425 08/11/23  0859 08/11/23  0025   GLUCOSE RANDOM mg/dL 128 130 169* 135     Results from last 7 days   Lab Units 08/11/23  0130   PH ART  7.428   PCO2 ART mm Hg 47.4*   PO2 ART mm Hg 67.9*   HCO3 ART mmol/L 30.6*   BASE EXC ART mmol/L 5.2   O2 CONTENT ART mL/dL 19.0   O2 HGB, ARTERIAL % 90.1*   ABG SOURCE  Radial, Left                 Results from last 7 days   Lab Units 08/11/23  0430 08/11/23  0251 08/11/23  0025   HS TNI 0HR ng/L  --   --  33   HS TNI 2HR ng/L  --  107*  --    HSTNI D2 ng/L  --  74*  --    HS TNI 4HR ng/L 129*  --   --    HSTNI D4 ng/L 96*  --   --          Results from last 7 days   Lab Units 08/11/23  0025   PROTIME seconds 14.1   INR  1.08   PTT seconds 28         Results from last 7 days   Lab Units 08/11/23  0025   PROCALCITONIN ng/ml 1.92*     Results from last 7 days   Lab Units 08/11/23  0251 08/11/23  0025   LACTIC ACID mmol/L 1.7 2.5*     Results from last 7 days   Lab Units 08/11/23  0325   CLARITY UA  Clear   COLOR UA  Zoe*   SPEC GRAV UA  1.020   PH UA  5.5   GLUCOSE UA mg/dl Negative   KETONES UA mg/dl Trace*   BLOOD UA  3+*   PROTEIN UA mg/dl 2+*   NITRITE UA  Negative   BILIRUBIN UA  Negative   UROBILINOGEN UA E.U./dl 0.2   LEUKOCYTES UA  Negative   WBC UA /hpf 1-2   RBC UA /hpf Innumerable*   BACTERIA UA /hpf Moderate*   EPITHELIAL CELLS WET PREP /hpf Occasional   MUCUS THREADS  Occasional*     Results from last 7 days   Lab Units 08/11/23  0430   INFLUENZA A PCR  Negative INFLUENZA B PCR  Negative   RSV PCR  Negative     Results from last 7 days   Lab Units 08/11/23  0055 08/11/23  0025   BLOOD CULTURE  No Growth at 48 hrs. No Growth at 48 hrs.      ED Treatment:   Medication Administration from 08/10/2023 2357 to 08/11/2023 0521       Date/Time Order Dose Route Action     08/11/2023 0150 EDT potassium chloride 20 mEq IVPB (premix) 20 mEq Intravenous New Bag     08/11/2023 0152 EDT sodium chloride 0.9 % bolus 1,000 mL 1,000 mL Intravenous New Bag     08/11/2023 0239 EDT ipratropium-albuterol (DUO-NEB) 0.5-2.5 mg/3 mL inhalation solution 3 mL 3 mL Nebulization Given     08/11/2023 0424 EDT aspirin chewable tablet 324 mg 324 mg Oral Given     08/11/2023 0448 EDT cefTRIAXone (ROCEPHIN) IVPB (premix in dextrose) 2,000 mg 50 mL 2,000 mg Intravenous New Bag     08/11/2023 0504 EDT iohexol (OMNIPAQUE) 350 MG/ML injection (SINGLE-DOSE) 100 mL 100 mL Intravenous Given        Past Medical History:   Diagnosis Date   • Anxiety    • Closed fracture of coccyx ([address]) 05/24/2023   • Diabetes mellitus (HCC)    • Hypertension    • IBS (irritable bowel syndrome)    • Shoulder fracture      Present on Admission:  • Hypokalemia  • Hematuria  • Elevated troponin  • Anxiety and depression  • Type 2 diabetes mellitus with diabetic polyneuropathy (HCC)  • Lactic acidosis  • Reactive airway disease with acute exacerbation  • GERD without esophagitis  • Hypomagnesemia      Admitting Diagnosis: UTI (urinary tract infection) [N39.0]  Hypoxia [R09.02]  Age/Sex: 69 y.o. female  Admission Orders:  Scheduled Medications:  aspirin, 81 mg, Oral, Daily  atorvastatin, 10 mg, Oral, Daily With Dinner  buPROPion, 300 mg, Oral, Daily  cefTRIAXone, 2,000 mg, Intravenous, Q24H  clonazePAM, 1 mg, Oral, HS  enoxaparin, 40 mg, Subcutaneous, Daily  fish oil, 1,000 mg, Oral, Daily  insulin glargine, 10 Units, Subcutaneous, HS  insulin lispro, 1-5 Units, Subcutaneous, TID AC  insulin lispro, 1-5 Units, Subcutaneous, HS  lisinopril, 2.5 mg, Oral, Daily  nicotine, 1 patch, Transdermal, Daily  pantoprazole, 40 mg, Oral, Daily  potassium chloride, 20 mEq, Oral, Daily With Breakfast  predniSONE, 40 mg, Oral, Daily  pregabalin, 200 mg, Oral, TID  sucralfate, 1 g, Oral, 4x Daily      Continuous IV Infusions:     PRN Meds:  acetaminophen, 650 mg, Oral, Q4H PRN  albuterol, 2.5 mg, Nebulization, Q4H PRN  ondansetron, 4 mg, Intravenous, Q6H PRN    POC GLUCOSE AC/HS WITH [employer]SI COVERAGE   Echo  IP CONSULT TO PULMONOLOGY  IP CONSULT TO UROLOGY  IP CONSULT TO CARDIOLOGY    Network Utilization Review Department  ATTENTION: Please call with any questions or concerns to [phone] and carefully listen to the prompts so that you are directed to the right person. All voicemails are confidential.  [facility] all requests for admission clinical reviews, approved or denied determinations and any other requests to dedicated fax number below belonging to the campus where the patient is receiving treatment.  List of dedicated fax numbers for the Facilities:  [place] DENIALS (Administrative/Medical Necessity) [phone]   [facility] (Maternity/NICU/Pediatrics) [phone]   [address] [place] [address] Drive [phone]   [facility] [phone]   [facility] [address] [facility] [id number] [place] [address] [phone]   [address] [address] [phone]

## 2023-08-13 NOTE — ASSESSMENT & PLAN NOTE
- Possibly related to acute illness and hypoxia present on admission with lactic acidosis and significant electrolyte abnormalities  -We will follow-up transthoracic echocardiogram  -Patient does not want any invasive or aggressive treatments or testing but is agreeable to medical therapy  -Continue atorvastatin and lisinopril at this time pending results of transthoracic echocardiogram May consider initiation of low-dose beta-blocker therapy

## 2023-08-13 NOTE — PLAN OF CARE
Problem: SAFETY ADULT  Goal: Patient will remain free of falls  Description: INTERVENTIONS:  - Educate patient/family on patient safety including physical limitations  - Instruct patient to call for assistance with activity   - Consult OT/PT to assist with strengthening/mobility   - Keep Call bell within reach  - Keep bed low and locked with side rails adjusted as appropriate  - Keep care items and personal belongings within reach  - Initiate and maintain comfort rounds  - Make Fall Risk Sign visible to staff  - Offer Toileting every 2 Hours, in advance of need  - Initiate/Maintain bed alarm  - Obtain necessary fall risk management equipment: non skid socks  - Apply yellow socks and bracelet for high fall risk patients  - Consider moving patient to room near nurses station  Outcome: Progressing     Problem: DISCHARGE PLANNING  Goal: Discharge to home or other facility with appropriate resources  Description: INTERVENTIONS:  - Identify barriers to discharge w/patient and caregiver  - Arrange for needed discharge resources and transportation as appropriate  - Identify discharge learning needs (meds, wound care, etc.)  - Arrange for interpretive services to assist at discharge as needed  - Refer to Case Management Department for coordinating discharge planning if the patient needs post-hospital services based on physician/advanced practitioner order or complex needs related to functional status, cognitive ability, or social support system  Outcome: Progressing

## 2023-08-13 NOTE — ASSESSMENT & PLAN NOTE
· Has resolved  · Highest oxygen requirement was 3.5 L of supplemental oxygen via nasal cannula, currently patient is on room air    · Patient has greater than a 65-pack-year smoking history -but does not carry a formal diagnosis of COPD -current symptoms secondary to a suspected bronchitis/COPD exacerbation  · Appreciate pulmonary input  · Continue respiratory protocol  · Continue prednisone 40 mg daily day #3/5  · Continue IV ceftriaxone day #3/5  · Upon completion of echocardiogram on Monday, 8/14/2023, patient should be stable for discharge  · Patient may need case management assistance in securing nephrology for discharge as per pulmonary recommendation  · Please refer to the pulmonary progress note from 8/12/2023 for additional details  ·   · Flu/covid/rsv negative  · Patient will benefit from an exercise desat study prior to discharge

## 2023-08-13 NOTE — PROGRESS NOTES
Progress Note - Pulmonary   Billayaan Herrmann 69 y.o. female MRN: [id number]  Unit/Bed#: -01 Encounter: [id number]    Assessment:  Acute hypoxemic respiratory failure-resolved-- could have had some component of exacerbation of underlying undiagnosed airways disease- no PFTs. Had some nocturnal hypoxemia last evening. 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 She has no prior imaging to review. No relevant occupational/environmental factors. Tobacco use disorder     Recommendations: On room air  OK to continue prednisone 40 for 5 days total steroid course given presentation per H/P .  PRN albuterol. She reports as outpatient she improved a bit when taking Trelegy but this was too expensive  She does have some peripheral eosinophilia. . I think Breztri or Trelegy would be reasonable for discharge- or else an ICS/LABA such as Advair/Breo/Symbicort. Dulera. Also PRN albuterol. Will need eventually to get outpatient PFTs. Antibiotics per primary team.  Recommend outpatient CT chest in 2 months to reevaluate the nodules and follow-up with me in the office  I messaged the office to arrange this. I also provided her my card and put my information in the discharge pathway in Epic. Urology evaluation ongoing- planned cystoscopy as an outpatient    Regarding pulmonary risk stratification for this procedure. She overall is in no respiratory distress and is on room air. She is being treated with prednisone and bronchodilators for possible exacerbation of reactive airways disease vs COPD but now is at baseline and has no complaints. Her ARISCAT score is acceptable. She can proceed to get a cystoscopy without further testing from a pulmonary perspective. Pulmonary medicine will sign off.   Call back with questions.       Subjective:   She feels better today. No dyspnea. Some chronic cough. No fever. She is on room air    Objective:     Vitals: Blood pressure 145/68, pulse 64, temperature 98.1 °F (36.7 °C), temperature source Oral, resp. rate 20, height 5' 1" (1.549 m), weight 72 kg (158 lb 11.7 oz), SpO2 94 %. ,Body mass index is 29.99 kg/m². Intake/Output Summary (Last 24 hours) at 8/13/2023 1132  Last data filed at 8/13/2023 0900  Gross per 24 hour   Intake 600 ml   Output 1200 ml   Net -600 ml       Invasive Devices     Peripheral Intravenous Line  Duration           Peripheral IV 08/11/23 Left Antecubital 2 days    Peripheral IV 08/11/23 Right Antecubital 2 days              Vitals:    08/13/23 0731   BP: 145/68   Pulse: 64   Resp: 20   Temp: 98.1 °F (36.7 °C)   SpO2: 94%     General:  Patient is awake, alert, non-toxic and in no acute respiratory distress  Eyes: PERRL, no scleral icterus  Neck: No JVD  CV:  Regular, +S1 and S2, No murmurs, gallops or rubs appreciated  Lungs: Clear to auscultation bilateral without wheeze, rales or rhonci  Abdomen: Soft, +BS, Non-tender, non-distended  Extremities: No clubbing, cyanosis or edema  Neuro: No focal motor/sensory deficits  Skin: Warm, No rashes or ulcerations        Labs: I have personally reviewed pertinent lab results. Imaging and other studies: I have personally reviewed pertinent reports.    and I have personally reviewed pertinent films in PACS

## 2023-08-13 NOTE — ASSESSMENT & PLAN NOTE
Lab Results   Component Value Date    HGBA1C 6.1 (H) 07/05/2023       Recent Labs     08/12/23  1119 08/12/23  1617 08/12/23  2122 08/13/23  0740   POCGLU 227* 264* 178* 136       Blood Sugar Average: Last 72 hrs:  (P) [id number]     -Counseled patient on dietary lifestyle modifications  -Plan of care per primary team

## 2023-08-13 NOTE — ASSESSMENT & PLAN NOTE
Lab Results   Component Value Date    HGBA1C 6.1 (H) 07/05/2023       Recent Labs     08/12/23  1119 08/12/23  1617 08/12/23  2122 08/13/23  0740   POCGLU 227* 264* 178* 136       Blood Sugar Average: Last 72 hrs:  · (P) [id number]   · Oral hypoglycemic medications remain on hold  · Target blood sugar for the hospital is 140-180  · Currently on Accu-Cheks before meals and at bedtime with sliding scale coverage  · Better control noted with Lantus  · Possibly worsened by steroids

## 2023-08-13 NOTE — ASSESSMENT & PLAN NOTE
- Symptoms seem to be improving currently off nasal cannula oxygen  -Plan of care per primary team and pulmonology

## 2023-08-13 NOTE — UTILIZATION REVIEW
NOTIFICATION OF INPATIENT ADMISSION   AUTHORIZATION REQUEST   SERVICING FACILITY:   [address] [place] [address]  [id number] [place] [address]  Tax ID: [id number]  NPI: [id number]   ATTENDING PROVIDER:  Attending Name and NPI#: Sharon Joseph [place] [[id number]]  Address: [address] [place] [address]  Phone: [phone]     ADMISSION INFORMATION:  [facility]   Place of Service Code: 21  Inpatient Admission Date/Time: 8/11/23  4:16 AM  Discharge Date/Time: No discharge date for patient encounter. Admitting Diagnosis Code/Description:  UTI (urinary tract infection) [N39.0]  Hypoxia [R09.02]     UTILIZATION REVIEW CONTACT:  Jessenia Garces Utilization   Network Utilization Review Department  Phone: [phone]  Fax [phone]  Email: [email or web address]  Contact for approvals/pending authorizations, clinical reviews, and discharge. PHYSICIAN ADVISORY SERVICES:  Medical Necessity Denial & [employer] Review  Phone: [phone]  Fax: [phone]  Email: [email or web address]

## 2023-08-13 NOTE — PROGRESS NOTES
[address]  Progress Note  Name: Adolph Rojas I  MRN: [id number]  Unit/Bed#: -01 I Date of Admission: 8/11/2023   Date of Service: 8/13/2023 I Hospital Day: 2    Assessment/Plan   Hypomagnesemia  Assessment & Plan  - Continue to monitor  -Replete to goal magnesium 2.0    Elevated troponin  Assessment & Plan  - Possibly related to acute illness and hypoxia present on admission with lactic acidosis and significant electrolyte abnormalities  -We will follow-up transthoracic echocardiogram  -Patient does not want any invasive or aggressive treatments or testing but is agreeable to medical therapy  -Continue atorvastatin and lisinopril at this time pending results of transthoracic echocardiogram May consider initiation of low-dose beta-blocker therapy      Hypokalemia  Assessment & Plan  - Continue to monitor  -Replete to goal potassium 4.0    Type 2 diabetes mellitus with diabetic polyneuropathy [facility])  Assessment & Plan  Lab Results   Component Value Date    HGBA1C 6.1 (H) 07/05/2023       Recent Labs     08/12/23  1119 08/12/23  1617 08/12/23  2122 08/13/23  0740   POCGLU 227* 264* 178* 136       Blood Sugar Average: Last 72 hrs:  (P) [id number]     -Counseled patient on dietary lifestyle modifications  -Plan of care per primary team    * Reactive airway disease with acute exacerbation  Assessment & Plan  - Symptoms seem to be improving currently off nasal cannula oxygen  -Plan of care per primary team and pulmonology      Outpatient Cardiologist: Wants to follow-up with Dr. Giovany Akers in [address] office like her       Subjective:   Patient seen and examined. Per patient she denies any chest pain, palpitations, lightness or dizziness, loss conscious, shortness of breath, lower extremity edema, orthopnea or bendopnea.   Overall she notes feeling much better than when she came into the hospital.      Summary comments:  -Patient seems to be tolerating initiation of medical therapy well  -We will follow-up transthoracic cardiogram results to assist with up titration of medical therapy  -We will follow-up CMP to monitor renal function and liver function testing now on atorvastatin  -After discussion again patient notes agreeable to medical therapy but does not wish to undergo any invasive or aggressive treatments or therapies including but not limited to cardiac catheterization.  -Patient counseled on the importance of dietary and lifestyle modifications along with complete tobacco cessation. Vitals: Blood pressure 145/68, pulse 64, temperature 98.1 °F (36.7 °C), temperature source Oral, resp. rate 20, height 5' 1" (1.549 m), weight 72 kg (158 lb 11.7 oz), SpO2 94 %.,   Orthostatic Blood Pressures    Flowsheet Row Most Recent Value   Blood Pressure 145/68 filed at 08/13/2023 0731   Patient Position - Orthostatic VS Sitting filed at 08/13/2023 0731      ,   Weight (last 2 days)     Date/Time Weight    08/11/23 05:44:54 72 (158.73)    08/11/23 0519 71.5 (157.52)          Physical Exam:  Physical Exam  Vitals reviewed. Constitutional:       General: She is not in acute distress. Appearance: She is obese. She is not diaphoretic. HENT:      Head: Normocephalic and atraumatic. Eyes:      General:         Right eye: No discharge. Left eye: No discharge. Neck:      Comments: Trachea midline, neck obese, difficult to assess JVD  Cardiovascular:      Rate and Rhythm: Normal rate and regular rhythm. Heart sounds: No friction rub. Pulmonary:      Effort: No respiratory distress. Breath sounds: No wheezing. Comments: Decreased breath sounds bilaterally  Chest:      Chest wall: No tenderness. Abdominal:      General: Bowel sounds are normal.      Palpations: Abdomen is soft. Tenderness: There is no abdominal tenderness. There is no rebound. Musculoskeletal:      Right lower leg: No edema. Left lower leg: No edema.    Skin: General: Skin is warm and dry. Neurological:      Mental Status: She is alert. Comments: Awake, alert, able to answer questions appropriately, able to move extremities bilaterally.    Psychiatric:         Mood and Affect: Mood normal.         Behavior: Behavior normal.       Medications:      Current Facility-Administered Medications:   •  acetaminophen (TYLENOL) tablet 650 mg, 650 mg, Oral, Q4H PRN, Tiki Talavera PA-C  •  albuterol inhalation solution 2.5 mg, 2.5 mg, Nebulization, Q4H PRN, Dung Martinez MD  •  aspirin chewable tablet 81 mg, 81 mg, Oral, Daily, Tiki Talavera PA-C, 81 mg at 08/13/23 1603  •  atorvastatin (LIPITOR) tablet 10 mg, 10 mg, Oral, Daily With Shasta Rodriguez DO, 10 mg at 08/12/23 1635  •  buPROPion (WELLBUTRIN XL) 24 hr tablet 300 mg, 300 mg, Oral, Daily, Tiki Talavera PA-C, 300 mg at 08/13/23 6999  •  cefTRIAXone (ROCEPHIN) IVPB (premix in dextrose) 2,000 mg 50 mL, 2,000 mg, Intravenous, Q24H, Pam Ambrocio PA-C, Last Rate: 100 mL/hr at 08/13/23 0526, 2,000 mg at 08/13/23 0526  •  clonazePAM (KlonoPIN) tablet 1 mg, 1 mg, Oral, HS, Pam Ambrocio PA-C, 1 mg at 08/12/23 2129  •  enoxaparin (LOVENOX) subcutaneous injection 40 mg, 40 mg, Subcutaneous, Daily, Tiki Talavera PA-C, 40 mg at 08/13/23 5205  •  fish oil capsule 1,000 mg, 1,000 mg, Oral, Daily, Tiki Talavera PA-C, 1,000 mg at 08/13/23 1257  •  insulin glargine (LANTUS) subcutaneous injection 10 Units 0.1 mL, 10 Units, Subcutaneous, HS, Dung Martinez MD, 10 Units at 08/12/23 2128  •  insulin lispro (HumaLOG) 100 units/mL subcutaneous injection 1-5 Units, 1-5 Units, Subcutaneous, TID AC, 2 Units at 08/12/23 1635 **AND** Fingerstick Glucose (POCT), , , TID AC, Pam Ambrocio PA-C  •  insulin lispro (HumaLOG) 100 units/mL subcutaneous injection 1-5 Units, 1-5 Units, Subcutaneous, , Pam Ambrocio PA-C, 1 Units at 08/12/23 2128  •  lisinopril (ZESTRIL) tablet 2.5 mg, 2.5 mg, Oral, Daily, Rosa Garcia DO, 2.5 mg at 08/13/23 6289  •  nicotine (NICODERM CQ) 21 mg/24 hr TD 24 hr patch 1 patch, 1 patch, Transdermal, Daily, Pam Ambrocio PA-C  •  ondansetron [facility]) injection 4 mg, 4 mg, Intravenous, Q6H PRN, Edilberto Duran PA-C  •  pantoprazole (PROTONIX) EC tablet 40 mg, 40 mg, Oral, Daily, Edilberto Duran PA-C, 40 mg at 08/13/23 7870  •  potassium chloride (K-DUR,KLOR-CON) CR tablet 20 mEq, 20 mEq, Oral, Daily With Breakfast, Edilberto Duran PA-C, 20 mEq at 08/13/23 6672  •  predniSONE tablet 40 mg, 40 mg, Oral, Daily, Edilberto Duran PA-C, 40 mg at 08/13/23 6559  •  pregabalin (LYRICA) capsule 200 mg, 200 mg, Oral, TID, Edilberto Duran PA-C, 200 mg at 08/13/23 5400  •  sucralfate (CARAFATE) tablet 1 g, 1 g, Oral, 4x Daily, Edilberto Duran PA-C, 1 g at 08/13/23 4792     Labs & Results:    Troponins:    Results from last 7 days   Lab Units 08/11/23  0430 08/11/23  0251 08/11/23  0025   HS TNI 0HR ng/L  --   --  33   HS TNI 2HR ng/L  --  107*  --    HSTNI D2 ng/L  --  74*  --    HS TNI 4HR ng/L 129*  --   --    HSTNI D4 ng/L 96*  --   --         BNP:     CBC with diff:   Results from last 7 days   Lab Units 08/13/23  0531 08/12/23  0425   WBC Thousand/uL 8.92 10.14   HEMOGLOBIN g/dL 12.4 12.6   HEMATOCRIT % 36.8 37.4   MCV fL 94 94   PLATELETS Thousands/uL 274 199     TSH:     CMP:   Results from last 7 days   Lab Units 08/13/23  0531 08/12/23  0425 08/11/23  0859 08/11/23  0025   POTASSIUM mmol/L 3.9 4.0   < > 2.6*   CHLORIDE mmol/L 97 91*   < > 92*   CO2 mmol/L 33* 33*   < > 32   BUN mg/dL 11 15   < > 12   CREATININE mg/dL 0.47* 0.59*   < > 0.59*   AST U/L  --   --   --  62*   ALT U/L  --   --   --  21   EGFR ml/min/1.73sq m 95 88   < > 88    < > = values in this interval not displayed.      Lipid Profile:     Coags:   Results from last 7 days   Lab Units 08/11/23  0025   INR  1.08

## 2023-08-13 NOTE — PLAN OF CARE
Problem: PAIN - ADULT  Goal: Verbalizes/displays adequate comfort level or baseline comfort level  Description: Interventions:  - Encourage patient to monitor pain and request assistance  - Assess pain using appropriate pain scale  - Administer analgesics based on type and severity of pain and evaluate response  - Implement non-pharmacological measures as appropriate and evaluate response  - Consider cultural and social influences on pain and pain management  - Notify physician/advanced practitioner if interventions unsuccessful or patient reports new pain  Outcome: Progressing     Problem: INFECTION - ADULT  Goal: Absence or prevention of progression during hospitalization  Description: INTERVENTIONS:  - Assess and monitor for signs and symptoms of infection  - Monitor lab/diagnostic results  - Monitor all insertion sites, i.e. indwelling lines, tubes, and drains  - Monitor endotracheal if appropriate and nasal secretions for changes in amount and color  - [place] appropriate cooling/warming therapies per order  - Administer medications as ordered  - Instruct and encourage patient and family to use good hand hygiene technique  - Identify and instruct in appropriate isolation precautions for identified infection/condition  Outcome: Progressing  Goal: Absence of fever/infection during neutropenic period  Description: INTERVENTIONS:  - Monitor WBC    Outcome: Progressing     Problem: SAFETY ADULT  Goal: Patient will remain free of falls  Description: INTERVENTIONS:  - Educate patient/family on patient safety including physical limitations  - Instruct patient to call for assistance with activity   - Consult OT/PT to assist with strengthening/mobility   - Keep Call bell within reach  - Keep bed low and locked with side rails adjusted as appropriate  - Keep care items and personal belongings within reach  - Initiate and maintain comfort rounds  - Make Fall Risk Sign visible to staff  - Offer Toileting every 2 Hours, in advance of need  - Initiate/Maintain bed alarm  - Obtain necessary fall risk management equipment  - Apply yellow socks and bracelet for high fall risk patients  - Consider moving patient to room near nurses station  Outcome: Progressing  Goal: Maintain or return to baseline ADL function  Description: INTERVENTIONS:  -  Assess patient's ability to carry out ADLs; assess patient's baseline for ADL function and identify physical deficits which impact ability to perform ADLs (bathing, care of mouth/teeth, toileting, grooming, dressing, etc.)  - Assess/evaluate cause of self-care deficits   - Assess range of motion  - Assess patient's mobility; develop plan if impaired  - Assess patient's need for assistive devices and provide as appropriate  - Encourage maximum independence but intervene and supervise when necessary  - Involve family in performance of ADLs  - Assess for home care needs following discharge   - Consider OT consult to assist with ADL evaluation and planning for discharge  - Provide patient education as appropriate  Outcome: Progressing  Goal: Maintains/Returns to pre admission functional level  Description: INTERVENTIONS:  - Perform BMAT or MOVE assessment daily.   - Set and communicate daily mobility goal to care team and patient/family/caregiver. - Collaborate with rehabilitation services on mobility goals if consulted  - Perform Range of Motion 3 times a day. - Reposition patient every 2 hours.   - Dangle patient 3 times a day  - Stand patient 3 times a day  - Ambulate patient 3 times a day  - Out of bed to chair 3 times a day   - Out of bed for meals 3 times a day  - Out of bed for toileting  - Record patient progress and toleration of activity level   Outcome: Progressing     Problem: DISCHARGE PLANNING  Goal: Discharge to home or other facility with appropriate resources  Description: INTERVENTIONS:  - Identify barriers to discharge w/patient and caregiver  - Arrange for needed discharge resources and transportation as appropriate  - Identify discharge learning needs (meds, wound care, etc.)  - Arrange for interpretive services to assist at discharge as needed  - Refer to Case Management Department for coordinating discharge planning if the patient needs post-hospital services based on physician/advanced practitioner order or complex needs related to functional status, cognitive ability, or social support system  Outcome: Progressing     Problem: Knowledge Deficit  Goal: Patient/family/caregiver demonstrates understanding of disease process, treatment plan, medications, and discharge instructions  Description: Complete learning assessment and assess knowledge base.   Interventions:  - Provide teaching at level of understanding  - Provide teaching via preferred learning methods  Outcome: Progressing     Problem: Prexisting or High Potential for Compromised Skin Integrity  Goal: Skin integrity is maintained or improved  Description: INTERVENTIONS:  - Identify patients at risk for skin breakdown  - Assess and monitor skin integrity  - Assess and monitor nutrition and hydration status  - Monitor labs   - Assess for incontinence   - Turn and reposition patient  - Assist with mobility/ambulation  - Relieve pressure over bony prominences  - Avoid friction and shearing  - Provide appropriate hygiene as needed including keeping skin clean and dry  - Evaluate need for skin moisturizer/barrier cream  - Collaborate with interdisciplinary team   - Patient/family teaching  - Consider wound care consult   Outcome: Progressing

## 2023-08-13 NOTE — PROGRESS NOTES
[id number] [facility]  Progress Note  Name: Marlene Perales  MRN: [id number]  Unit/Bed#: -01 I Date of Admission: 8/11/2023   Date of Service: 8/13/2023 I Hospital Day: 2    Assessment/Plan   * Reactive airway disease with acute exacerbation  Assessment & Plan  · Has resolved  · Highest oxygen requirement was 3.5 L of supplemental oxygen via nasal cannula, currently patient is on room air    · Patient has greater than a 65-pack-year smoking history -but does not carry a formal diagnosis of COPD -current symptoms secondary to a suspected bronchitis/COPD exacerbation  · Appreciate pulmonary input  · Continue respiratory protocol  · Continue prednisone 40 mg daily day #3/5  · Continue IV ceftriaxone day #3/5  · Upon completion of echocardiogram on Monday, 8/14/2023, patient should be stable for discharge  · Patient may need case management assistance in securing nephrology for discharge as per pulmonary recommendation  · Please refer to the pulmonary progress note from 8/12/2023 for additional details  ·   · Flu/covid/rsv negative  · Patient will benefit from an exercise desat study prior to discharge    Elevated troponin  Assessment & Plan  · None myocardial injury/ischemia related elevated troponin secondary to acute illness/hypoxia   · Appreciate cardiology input  · Patient remains asymptomatic from a cardiac standpoint-denies any chest pain  · Case reviewed with Dr. Candace Donnelly- patient to remain in house until Monday, 8/14/2023 for 2D echocardiogram evaluation  · Continue current cardiac based medications include aspirin 81 mg p.o. daily, lisinopril 2.5 mg p.o. 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  · Status post a urology evaluation  · No further inpatient testing, treatment, and work-up is needed, urology will be following up in the near future in the outpatient setting for cystoscopy, and further management  · H&H has remained stable    Type 2 diabetes mellitus with diabetic polyneuropathy [facility])  Assessment & Plan  Lab Results   Component Value Date    HGBA1C 6.1 (H) 2023       Recent Labs     23  1119 23  1617 23  2122 23  0740   POCGLU 227* 264* 178* 136       Blood Sugar Average: Last 72 hrs:  · (P) [id number]   · Oral hypoglycemic medications remain on hold  · Target blood sugar for the hospital is 140-180  · Currently on Accu-Cheks before meals and at bedtime with sliding scale coverage  · Better control noted with Lantus  · Possibly worsened by steroids    Hypokalemia  Assessment & Plan  · Resolved with repletion  · Current sodium is 136    Anxiety and depression  Assessment & Plan  · Continue home medication with bupropion 300 mg daily and clonazepam daily at bedtime    GERD without esophagitis  Assessment & Plan  · Continue PPI    Hypomagnesemia  Assessment & Plan  · Resolved with repletion    Lactic acidosis  Assessment & Plan  · Resolved most recent lactic acid level is 1.7             VTE Prophylaxis:  Enoxaparin (Lovenox)    Patient Centered Rounds: I have performed bedside rounds with nursing staff today.     Discussions with Specialists or Other Care Team Provider: Pulmonary, case management, nursing  Education and Discussions with Family / Patient: Patient was brought up to par, she once again reported that she would update her family members herself    Current Length of Stay: 2 day(s)    Current Patient Status: Inpatient   Certification Statement: The patient will continue to require additional inpatient hospital stay due to The need for a 2D echocardiogram on Monday, 2023    Discharge Plan: Discharge planning for 2023    Code Status: Level 3 - DNAR and DNI    Subjective:   Patient seen, reports feeling a lot better, denies any pain or discomfort    Objective:     Vitals:   Temp (24hrs), Av.9 °F (36.6 °C), Min:97.4 °F (36.3 °C), Max:98.3 °F (36.8 °C)    Temp:  [97.4 °F (36.3 °C)-98.3 °F (36.8 °C)] 98.1 °F (36.7 °C)  HR:  [64-77] 64  Resp:  [17-20] 20  BP: (118-145)/(59-68) 145/68  SpO2:  [87 %-94 %] 94 %  Body mass index is 29.99 kg/m². Input and Output Summary (last 24 hours): Intake/Output Summary (Last 24 hours) at 8/13/2023 0752  Last data filed at 8/13/2023 2606  Gross per 24 hour   Intake 720 ml   Output 2000 ml   Net -1280 ml       Physical Exam:   Physical Exam  Constitutional:       General: She is not in acute distress. Appearance: Normal appearance. She is normal weight. She is not ill-appearing. HENT:      Head: Normocephalic and atraumatic. Nose: Nose normal.      Mouth/Throat:      Mouth: Mucous membranes are moist.   Eyes:      Extraocular Movements: Extraocular movements intact. Pupils: Pupils are equal, round, and reactive to light. Cardiovascular:      Rate and Rhythm: Normal rate and regular rhythm. Pulses: Normal pulses. Heart sounds: Normal heart sounds. No murmur heard. No friction rub. No gallop. Pulmonary:      Effort: Pulmonary effort is normal. No respiratory distress. Breath sounds: Normal breath sounds. No wheezing, rhonchi or rales. Abdominal:      General: There is no distension. Palpations: Abdomen is soft. There is no mass. Tenderness: There is no abdominal tenderness. Hernia: No hernia is present. Musculoskeletal:         General: No swelling or tenderness. Normal range of motion. Cervical back: Normal range of motion and neck supple. No rigidity. Right lower leg: No edema. Left lower leg: No edema. Skin:     General: Skin is warm. Capillary Refill: Capillary refill takes less than 2 seconds. Findings: No erythema or rash. Neurological:      General: No focal deficit present. Mental Status: She is alert and oriented to person, place, and time. Mental status is at baseline. Cranial Nerves: No cranial nerve deficit. Motor: No weakness. Psychiatric:         Mood and Affect: Mood normal.         Behavior: Behavior normal.         Additional Data:     Labs:    Results from last 7 days   Lab Units 08/13/23  0531   WBC Thousand/uL 8.92   HEMOGLOBIN g/dL 12.4   HEMATOCRIT % 36.8   PLATELETS Thousands/uL 274   NEUTROS PCT % 65   LYMPHS PCT % 25   MONOS PCT % 6   EOS PCT % 4     Results from last 7 days   Lab Units 08/13/23  0531 08/11/23  0859 08/11/23  0025   SODIUM mmol/L 136   < > 135   POTASSIUM mmol/L 3.9   < > 2.6*   CHLORIDE mmol/L 97   < > 92*   CO2 mmol/L 33*   < > 32   BUN mg/dL 11   < > 12   CREATININE mg/dL 0.47*   < > 0.59*   CALCIUM mg/dL 9.7   < > 9.8   ALK PHOS U/L  --   --  98   ALT U/L  --   --  21   AST U/L  --   --  62*    < > = values in this interval not displayed. Results from last 7 days   Lab Units 08/11/23  0025   INR  1.08     Results from last 7 days   Lab Units 08/13/23  0740 08/12/23  2122 08/12/23  1617 08/12/23  1119 08/12/23  0659 08/11/23  2117 08/11/23  1605 08/11/23  1200 08/11/23  0729   POC GLUCOSE mg/dl 136 178* 264* 227* 152* 177* 262* 195* 163*           * I Have Reviewed All Lab Data Listed Above. * Additional Pertinent Lab Tests Reviewed: [facility] Admission  Reviewed    Imaging:  Imaging Reports Reviewed Today Include: None    Recent Cultures (last 7 days):     Results from last 7 days   Lab Units 08/11/23  0055 08/11/23  0025   BLOOD CULTURE  No Growth at 24 hrs. No Growth at 24 hrs.        Last 24 Hours Medication List:   Current Facility-Administered Medications   Medication Dose Route Frequency Provider Last Rate   • acetaminophen  650 mg Oral Q4H PRN Sharon Moyer PA-C     • albuterol  2.5 mg Nebulization Q4H PRN Kj Puga MD     • aspirin  81 mg Oral Daily Sharon Moyer [place]     • atorvastatin  10 mg Oral Daily With Trinity Austin DO     • buPROPion  300 mg Oral Daily Sharon Moyer PA-C     • cefTRIAXone 2,000 mg Intravenous Q24H Sienna Tejeda PA-C 2,000 mg (08/13/23 0526)   • clonazePAM  1 mg Oral HS Sienna Tejeda PA-C     • enoxaparin  40 mg Subcutaneous Daily Sienna Tejeda PA-C     • fish oil  1,000 mg Oral Daily Sienna Tejeda [place]     • insulin glargine  10 Units Subcutaneous HS Emperatriz Holbrook MD     • insulin lispro  1-5 Units Subcutaneous TID AC Sienna Tejeda PA-C     • insulin lispro  1-5 Units Subcutaneous HS Sienna Tejeda PA-C     • lisinopril  2.5 mg Oral Daily Jose Bone DO     • nicotine  1 patch Transdermal Daily Sienna Tejeda PA-C     • ondansetron  4 mg Intravenous Q6H PRN Sienna Tejeda PA-C     • pantoprazole  40 mg Oral Daily Sienna Tejeda PA-C     • potassium chloride  20 mEq Oral Daily With Breakfast Sienna Tejeda PA-C     • predniSONE  40 mg Oral Daily Sienna Tejeda PA-C     • pregabalin  200 mg Oral TID Sienna Tejeda PA-C     • sucralfate  1 g Oral 4x Daily Sienna Tejeda PA-C          Today, Patient Was Seen By: Emperatriz Holbrook MD    ** Please Note: Dictation voice to text software may have been used in the creation of this document.  **

## 2023-08-13 NOTE — ASSESSMENT & PLAN NOTE
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

## 2023-08-14 ENCOUNTER — APPOINTMENT (INPATIENT)
Dept: NON INVASIVE DIAGNOSTICS | Facility: HOSPITAL | Age: 78
DRG: 190 | End: 2023-08-14
Payer: COMMERCIAL

## 2023-08-14 ENCOUNTER — TRANSITIONAL CARE MANAGEMENT (OUTPATIENT)
Dept: FAMILY MEDICINE CLINIC | Facility: CLINIC | Age: 78
End: 2023-08-14

## 2023-08-14 VITALS
DIASTOLIC BLOOD PRESSURE: 71 MMHG | BODY MASS INDEX: 29.83 KG/M2 | RESPIRATION RATE: 19 BRPM | OXYGEN SATURATION: 92 % | HEART RATE: 74 BPM | TEMPERATURE: 98.3 F | SYSTOLIC BLOOD PRESSURE: 115 MMHG | HEIGHT: 61 IN | WEIGHT: 158 LBS

## 2023-08-14 LAB
ALBUMIN SERPL BCP-MCNC: 3.4 G/DL (ref 3.5–5)
ALP SERPL-CCNC: 47 U/L (ref 34–104)
ALT SERPL W P-5'-P-CCNC: 11 U/L (ref 7–52)
ANION GAP SERPL CALCULATED.3IONS-SCNC: 5 MMOL/L
AORTIC ROOT: 3.1 CM
AORTIC VALVE MEAN VELOCITY: 8.5 M/S
APICAL FOUR CHAMBER EJECTION FRACTION: 48 %
ASCENDING AORTA: 3.3 CM
AST SERPL W P-5'-P-CCNC: 13 U/L (ref 13–39)
AV AREA BY CONTINUOUS VTI: 2.4 CM2
AV AREA PEAK VELOCITY: 2.3 CM2
AV LVOT MEAN GRADIENT: 2 MMHG
AV LVOT PEAK GRADIENT: 3 MMHG
AV MEAN GRADIENT: 3 MMHG
AV PEAK GRADIENT: 6 MMHG
AV VALVE AREA: 2.36 CM2
AV VELOCITY RATIO: 0.74
BILIRUB SERPL-MCNC: 0.31 MG/DL (ref 0.2–1)
BUN SERPL-MCNC: 10 MG/DL (ref 5–25)
CALCIUM ALBUM COR SERPL-MCNC: 10.3 MG/DL (ref 8.3–10.1)
CALCIUM SERPL-MCNC: 9.8 MG/DL (ref 8.4–10.2)
CHLORIDE SERPL-SCNC: 98 MMOL/L (ref 96–108)
CO2 SERPL-SCNC: 34 MMOL/L (ref 21–32)
CREAT SERPL-MCNC: 0.49 MG/DL (ref 0.6–1.3)
DOP CALC AO PEAK VEL: 1.24 M/S
DOP CALC AO VTI: 28.74 CM
DOP CALC LVOT AREA: 3.14 CM2
DOP CALC LVOT CARDIAC INDEX: 2.68 L/MIN/M2
DOP CALC LVOT CARDIAC OUTPUT: 4.59 L/MIN
DOP CALC LVOT DIAMETER: 2 CM
DOP CALC LVOT PEAK VEL VTI: 21.62 CM
DOP CALC LVOT PEAK VEL: 0.92 M/S
DOP CALC LVOT STROKE INDEX: 38.6 ML/M2
DOP CALC LVOT STROKE VOLUME: 67.89
E WAVE DECELERATION TIME: 117 MS
FRACTIONAL SHORTENING: 31 (ref 28–44)
GFR SERPL CREATININE-BSD FRML MDRD: 94 ML/MIN/1.73SQ M
GLUCOSE SERPL-MCNC: 129 MG/DL (ref 65–140)
GLUCOSE SERPL-MCNC: 132 MG/DL (ref 65–140)
GLUCOSE SERPL-MCNC: 149 MG/DL (ref 65–140)
INTERVENTRICULAR SEPTUM IN DIASTOLE (PARASTERNAL SHORT AXIS VIEW): 1.3 CM
INTERVENTRICULAR SEPTUM: 1.3 CM (ref 0.6–1.1)
LAAS-AP2: 14.8 CM2
LAAS-AP4: 16 CM2
LEFT ATRIUM SIZE: 3.1 CM
LEFT ATRIUM VOLUME (MOD BIPLANE): 42 ML
LEFT INTERNAL DIMENSION IN SYSTOLE: 3.1 CM (ref 2.1–4)
LEFT VENTRICULAR INTERNAL DIMENSION IN DIASTOLE: 4.5 CM (ref 3.5–6)
LEFT VENTRICULAR POSTERIOR WALL IN END DIASTOLE: 1.2 CM
LEFT VENTRICULAR STROKE VOLUME: 54 ML
LVSV (TEICH): 54 ML
MV E'TISSUE VEL-SEP: 10 CM/S
MV PEAK A VEL: 1.11 M/S
MV PEAK E VEL: 88 CM/S
MV STENOSIS PRESSURE HALF TIME: 34 MS
MV VALVE AREA P 1/2 METHOD: 6.47
POTASSIUM SERPL-SCNC: 4.2 MMOL/L (ref 3.5–5.3)
PROT SERPL-MCNC: 5.8 G/DL (ref 6.4–8.4)
RA PRESSURE ESTIMATED: 8 MMHG
RIGHT ATRIUM AREA SYSTOLE A4C: 8.9 CM2
RIGHT VENTRICLE ID DIMENSION: 2.7 CM
RV PSP: 31 MMHG
SL CV LEFT ATRIUM LENGTH A2C: 4.6 CM
SL CV LV EF: 50
SL CV PED ECHO LEFT VENTRICLE DIASTOLIC VOLUME (MOD BIPLANE) 2D: 91 ML
SL CV PED ECHO LEFT VENTRICLE SYSTOLIC VOLUME (MOD BIPLANE) 2D: 36 ML
SODIUM SERPL-SCNC: 137 MMOL/L (ref 135–147)
TR MAX PG: 23 MMHG
TR PEAK VELOCITY: 2.4 M/S
TRICUSPID ANNULAR PLANE SYSTOLIC EXCURSION: 2 CM
TRICUSPID VALVE PEAK REGURGITATION VELOCITY: 2.42 M/S

## 2023-08-14 PROCEDURE — 94664 DEMO&/EVAL PT USE INHALER: CPT

## 2023-08-14 PROCEDURE — 93306 TTE W/DOPPLER COMPLETE: CPT

## 2023-08-14 PROCEDURE — 94760 N-INVAS EAR/PLS OXIMETRY 1: CPT

## 2023-08-14 PROCEDURE — 99232 SBSQ HOSP IP/OBS MODERATE 35: CPT | Performed by: INTERNAL MEDICINE

## 2023-08-14 PROCEDURE — 82948 REAGENT STRIP/BLOOD GLUCOSE: CPT

## 2023-08-14 PROCEDURE — 80053 COMPREHEN METABOLIC PANEL: CPT | Performed by: INTERNAL MEDICINE

## 2023-08-14 PROCEDURE — 99239 HOSP IP/OBS DSCHRG MGMT >30: CPT | Performed by: INTERNAL MEDICINE

## 2023-08-14 PROCEDURE — 93306 TTE W/DOPPLER COMPLETE: CPT | Performed by: INTERNAL MEDICINE

## 2023-08-14 RX ORDER — LISINOPRIL 2.5 MG/1
2.5 TABLET ORAL DAILY
Qty: 30 TABLET | Refills: 0 | Status: SHIPPED | OUTPATIENT
Start: 2023-08-15 | End: 2023-08-21 | Stop reason: SDUPTHER

## 2023-08-14 RX ORDER — ALBUTEROL SULFATE 90 UG/1
2 AEROSOL, METERED RESPIRATORY (INHALATION) EVERY 6 HOURS PRN
Qty: 18 G | Refills: 0 | Status: SHIPPED | OUTPATIENT
Start: 2023-08-14

## 2023-08-14 RX ORDER — CEFDINIR 300 MG/1
300 CAPSULE ORAL EVERY 12 HOURS SCHEDULED
Qty: 4 CAPSULE | Refills: 0 | Status: SHIPPED | OUTPATIENT
Start: 2023-08-14 | End: 2023-08-16

## 2023-08-14 RX ORDER — FLUTICASONE PROPIONATE AND SALMETEROL 250; 50 UG/1; UG/1
1 POWDER RESPIRATORY (INHALATION) 2 TIMES DAILY
Qty: 60 BLISTER | Refills: 0 | Status: SHIPPED | OUTPATIENT
Start: 2023-08-14 | End: 2023-09-13

## 2023-08-14 RX ORDER — PREDNISONE 20 MG/1
40 TABLET ORAL DAILY
Qty: 2 TABLET | Refills: 0 | Status: SHIPPED | OUTPATIENT
Start: 2023-08-15 | End: 2023-08-16

## 2023-08-14 RX ORDER — ATORVASTATIN CALCIUM 10 MG/1
10 TABLET, FILM COATED ORAL
Qty: 30 TABLET | Refills: 0 | Status: SHIPPED | OUTPATIENT
Start: 2023-08-14 | End: 2023-08-21 | Stop reason: SDUPTHER

## 2023-08-14 RX ORDER — BUDESONIDE AND FORMOTEROL FUMARATE DIHYDRATE 160; 4.5 UG/1; UG/1
2 AEROSOL RESPIRATORY (INHALATION) 2 TIMES DAILY
Qty: 10.2 G | Refills: 0 | Status: SHIPPED | OUTPATIENT
Start: 2023-08-14 | End: 2023-08-14

## 2023-08-14 RX ADMIN — ASPIRIN 81 MG CHEWABLE TABLET 81 MG: 81 TABLET CHEWABLE at 08:26

## 2023-08-14 RX ADMIN — CEFTRIAXONE 2000 MG: 2 INJECTION, SOLUTION INTRAVENOUS at 05:04

## 2023-08-14 RX ADMIN — SUCRALFATE 1 G: 1 TABLET ORAL at 08:26

## 2023-08-14 RX ADMIN — LISINOPRIL 2.5 MG: 2.5 TABLET ORAL at 08:26

## 2023-08-14 RX ADMIN — PREGABALIN 200 MG: 100 CAPSULE ORAL at 08:26

## 2023-08-14 RX ADMIN — OMEGA-3 FATTY ACIDS CAP 1000 MG 1000 MG: 1000 CAP at 08:26

## 2023-08-14 RX ADMIN — ENOXAPARIN SODIUM 40 MG: 40 INJECTION SUBCUTANEOUS at 08:26

## 2023-08-14 RX ADMIN — SUCRALFATE 1 G: 1 TABLET ORAL at 11:58

## 2023-08-14 RX ADMIN — POTASSIUM CHLORIDE 20 MEQ: 750 TABLET, EXTENDED RELEASE ORAL at 07:42

## 2023-08-14 RX ADMIN — PREDNISONE 40 MG: 20 TABLET ORAL at 08:26

## 2023-08-14 RX ADMIN — PANTOPRAZOLE SODIUM 40 MG: 40 TABLET, DELAYED RELEASE ORAL at 05:04

## 2023-08-14 RX ADMIN — BUPROPION HYDROCHLORIDE 300 MG: 150 TABLET, EXTENDED RELEASE ORAL at 08:26

## 2023-08-14 NOTE — ASSESSMENT & PLAN NOTE
- Symptoms seem to be improving off nasal cannula oxygen  -Plan of care per primary team and pulmonology

## 2023-08-14 NOTE — DISCHARGE SUMMARY
[id number] [facility]  Discharge- Kayden Venegas 1945, 69 y.o. female MRN: [id number]  Unit/Bed#: -01 Encounter: [id number]  Primary Care Provider: Evens Jones DO   Date and time admitted to hospital: 8/11/2023 12:03 AM    * Reactive airway disease with acute exacerbation  Assessment & Plan  · Has resolved  · Highest oxygen requirement was 3.5 L of supplemental oxygen via nasal cannula, currently patient is on room air    · Patient has greater than a 65-pack-year smoking history -but does not carry a formal diagnosis of COPD -current symptoms secondary to a suspected bronchitis/COPD exacerbation  · Appreciate pulmonary input  · Will discharge on prednisone and cefdinir to complete course of therapy  · Per pulmonology we will also discharged on albuterol inhaler as well as Symbicort  · Follow-up with pulmonology as outpatient  · Flu/covid/rsv negative  · Will need repeat chest CT scan in 2 months per pulmonology to follow-up CT findings    Elevated troponin  Assessment & Plan  · None myocardial injury/ischemia related elevated troponin secondary to acute illness/hypoxia   · Appreciate cardiology input  · Patient remains asymptomatic from a cardiac standpoint-denies any chest pain  · Case reviewed with Dr. Dc Ruvalcaba- patient to remain in house until Monday, 8/14/2023 for 2D echocardiogram evaluation  · Continue current cardiac based medications include aspirin 81 mg p.o. daily, lisinopril 2.5 mg p.o. daily and atorvastatin 10 mg daily  · Per cardiology patient cleared from cardiac standpoint for discharge.   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  · Status post a urology evaluation  · No further inpatient testing, treatment, and work-up is needed, urology will be following up in the near future in the outpatient setting for cystoscopy, and further management  · H&H has remained stable    Hypokalemia  Assessment & Plan  · Resolved with repletion  · Follow-up with PCP as outpatient for repeat blood work in 1 week    GERD without esophagitis  Assessment & Plan  · Continue PPI    Anxiety and depression  Assessment & Plan  · Continue home medication with bupropion 300 mg daily and clonazepam daily at bedtime    Type 2 diabetes mellitus with diabetic polyneuropathy [facility])  Assessment & Plan  Lab Results   Component Value Date    HGBA1C 6.1 (H) 07/05/2023       Recent Labs     08/13/23  1127 08/13/23  1602 08/13/23  2108 08/14/23  0636   POCGLU 155* 286* 171* 132       Blood Sugar Average: Last 72 hrs:  · (P) 649.0403189435374675   · Continue home diabetic regimen      Discharging Physician / Practitioner: Manny Calle MD  PCP: Casey Babin DO  Admission Date:   Admission Orders (From admission, onward)     Ordered        08/11/23 0416  INPATIENT ADMISSION  Once                      Discharge Date: 08/14/23    Medical Problems     Resolved Problems  Date Reviewed: 8/11/2023   None         Consultations During Hospital Stay:  · Pulmonology, cardiology, urology    Procedures Performed:   · none    Significant Findings / Test Results:   · Reactive airway disease    Incidental Findings:   · Extensive predominantly subpleural perilymphatic reticulonodular densities, pulmonology to follow-up patient in the office to have repeat imaging  · 1.4 cm soft tissue nodule upper pole collecting system of the left kidney. Urology to follow-up as outpatient    Test Results Pending at Discharge (will require follow up): · None     Outpatient Tests Requested:  · Routine labs with PCP in 1 week    Complications:    • None    Reason for Admission: Reactive airway disease    Hospital Course: Nicolette Hubbard is a 69 y.o. female patient who originally presented to the hospital on 8/11/2023 due to hematuria. Patient had CT finding of renal soft tissue nodule. Urology was consulted. Urine was sent for cytology, currently pending. Per urology patient will need outpatient follow-up for cystoscopy and possible biopsy. Hemoglobin level has remained stable. No further inpatient work-up at this time  During hospitalization patient also noted to have acute exacerbation of COPD however patient does not have an official diagnosis of COPD as there are no previous PFTs. Patient was started on steroids, antibiotics, nebulizers. Patient was initially requiring oxygen and gradually titrated off. Currently saturating well on room air with saturations greater than 88%. Neurology recommended discharge on albuterol inhaler and Symbicort. Patient was discharged on oral steroids as well as antibiotics for possible underlying infection. Patient will need outpatient follow-up with pulmonology to repeat CT imaging given chest CT findings  Also seen by cardiology for elevated troponins. Medications have been optimized. Echocardiogram was performed. Patient to follow-up with cardiology as outpatient. Patient also mentioned inflammation of right second toe. No significant erythema or drainage noted. Patient referred to podiatry as outpatient. Advised to return if she had any pain, drainage, worsening erythema. Patient currently on antibiotics for pulmonary infection which should also cover any potential soft tissue infection although no obvious signs of infection noted of foot at this time but given history of diabetes will need close podiatry follow-up    Please see above list of diagnoses and related plan for additional information.      Condition at Discharge: stable     Discharge Day Visit / Exam:     Subjective: No complaints at this time    Vitals: Blood Pressure: 115/71 (08/14/23 0905)  Pulse: 74 (08/14/23 0905)  Temperature: 98.3 °F (36.8 °C) (08/13/23 2243)  Temp Source: Oral (08/13/23 1528)  Respirations: 19 (08/13/23 2243)  Height: 5' 1" (154.9 cm) (08/14/23 0905)  Weight - Scale: 71.7 kg (158 lb) (08/14/23 0905)  SpO2: 92 % (08/14/23 0722)     Exam:   Physical Exam  Constitutional:       General: She is not in acute distress. HENT:      Head: Normocephalic and atraumatic. Nose: Nose normal.      Mouth/Throat:      Mouth: Mucous membranes are moist.   Eyes:      Extraocular Movements: Extraocular movements intact. Conjunctiva/sclera: Conjunctivae normal.   Cardiovascular:      Rate and Rhythm: Normal rate and regular rhythm. Pulmonary:      Effort: Pulmonary effort is normal. No respiratory distress. Abdominal:      Palpations: Abdomen is soft. Tenderness: There is no abdominal tenderness. Musculoskeletal:         General: Normal range of motion. Cervical back: Normal range of motion and neck supple. Skin:     General: Skin is warm and dry. Neurological:      General: No focal deficit present. Mental Status: She is alert. Mental status is at baseline. Cranial Nerves: No cranial nerve deficit. Psychiatric:         Mood and Affect: Mood normal.         Behavior: Behavior normal.         Discussion with Family: Updated patient regarding discharge plan    Discharge instructions/Information to patient and family:   See after visit summary for information provided to patient and family. Provisions for Follow-Up Care:  See after visit summary for information related to follow-up care and any pertinent home health orders. Disposition:     Home      Planned Readmission:   • no     Discharge Statement:  I spent 35 minutes discharging the patient. This time was spent on the day of discharge. I had direct contact with the patient on the day of discharge. Greater than 50% of the total time was spent examining patient, answering all patient questions, arranging and discussing plan of care with patient as well as directly providing post-discharge instructions. Additional time then spent on discharge activities.     Discharge Medications:  See after visit summary for reconciled discharge medications provided to patient and family.       ** Please Note: This note has been constructed using a voice recognition system **

## 2023-08-14 NOTE — NURSING NOTE
Discharge instructions reviewed with pt and family. Verbalized understanding. Left in stable condition with family and all belongings.

## 2023-08-14 NOTE — DISCHARGE INSTR - AVS FIRST PAGE
Please have repeat blood work, specifically BMP to monitor kidney function and potassium level, in 1 week

## 2023-08-14 NOTE — ASSESSMENT & PLAN NOTE
- Well-controlled at this time  -Continue lisinopril  -Would not restart home hydrochlorothiazide but patient counseled to monitor blood pressures at home and let our office know or her primary care physician know for up titration of medical therapy if blood pressures remain elevated greater than 130s/80s mmHg.

## 2023-08-14 NOTE — ASSESSMENT & PLAN NOTE
- Thought to be non-MI troponin elevation related to acute illness and hypoxia present on admission with lactic acidosis and significant electrolyte abnormalities  -Transthoracic echocardiogram showing overall low normal LVEF  -Patient does not want any invasive or surgical treatments or testing but is agreeable to medical therapy  -Continue atorvastatin and lisinopril at this time  -I have sent message to office staff to help set patient up for outpatient follow-up.  -Patient should have BMP in 1 week to monitor renal function electrolytes  -Would not restart hydrochlorothiazide.

## 2023-08-14 NOTE — ASSESSMENT & PLAN NOTE
Lab Results   Component Value Date    HGBA1C 6.1 (H) 07/05/2023       Recent Labs     08/13/23  1127 08/13/23  1602 08/13/23  2108 08/14/23  0636   POCGLU 155* 286* 171* 132       Blood Sugar Average: Last 72 hrs:  (P) 318.3161125139392137     -Counseled on dietary and lifestyle modifications  -Plan of care per primary team

## 2023-08-14 NOTE — PROGRESS NOTES
[address] [facility]  Progress Note  Name: Frannie Ahumada I  MRN: [id number]  Unit/Bed#: -01 I Date of Admission: 8/11/2023   Date of Service: 8/14/2023 I Hospital Day: 3    Assessment/Plan   Essential hypertension  Assessment & Plan  - Well-controlled at this time  -Continue lisinopril  -Would not restart home hydrochlorothiazide but patient counseled to monitor blood pressures at home and let our office know or her primary care physician know for up titration of medical therapy if blood pressures remain elevated greater than 130s/80s mmHg. Hypomagnesemia  Assessment & Plan  - Continue to monitor at this time    Elevated troponin  Assessment & Plan  - Thought to be non-MI troponin elevation related to acute illness and hypoxia present on admission with lactic acidosis and significant electrolyte abnormalities  -Transthoracic echocardiogram showing overall low normal LVEF  -Patient does not want any invasive or surgical treatments or testing but is agreeable to medical therapy  -Continue atorvastatin and lisinopril at this time  -I have sent message to office staff to help set patient up for outpatient follow-up.  -Patient should have BMP in 1 week to monitor renal function electrolytes  -Would not restart hydrochlorothiazide.       Hypokalemia  Assessment & Plan  - Continue to monitor at this time    Type 2 diabetes mellitus with diabetic polyneuropathy [facility])  Assessment & Plan  Lab Results   Component Value Date    HGBA1C 6.1 (H) 07/05/2023       Recent Labs     08/13/23  1127 08/13/23  1602 08/13/23  2108 08/14/23  0636   POCGLU 155* 286* 171* 132       Blood Sugar Average: Last 72 hrs:  (P) 731.6542665908866784     -Counseled on dietary and lifestyle modifications  -Plan of care per primary team    * Reactive airway disease with acute exacerbation  Assessment & Plan  - Symptoms seem to be improving off nasal cannula oxygen  -Plan of care per primary team and pulmonology      Outpatient Cardiologist: Wants to see Dr. Titus Henning in the outpatient setting at [facility] office like her . Subjective:   Patient seen and examined. Per patient denies any chest pain, palpitations, lightness or dizziness, loss of consciousness or shortness of breath. She notes overall feeling well today and wishes to be discharged. Summary comments:  -Continue current medical therapy with lisinopril and atorvastatin  -I have sent message to office staff to contact patient to help set her up with hospital follow-up appointment  -Patient would not want any invasive or aggressive treatments or testing.  -Patient to have BMP in 1 week to monitor renal function and electrolytes on medical regimen  -Would not restart hydrochlorothiazide to avoid worsening electrolyte abnormalities which were significant on admission  -Patient should monitor blood pressure readings at home  -Patient stable from cardiac standpoint for discharge      Vitals: Blood pressure 115/71, pulse 74, temperature 98.3 °F (36.8 °C), resp. rate 19, height 5' 1" (1.549 m), weight 71.7 kg (158 lb), SpO2 92 %.,   Orthostatic Blood Pressures    Flowsheet Row Most Recent Value   Blood Pressure 115/71 filed at 08/14/2023 0905   Patient Position - Orthostatic VS Sitting filed at 08/13/2023 1528      ,   Weight (last 2 days)     Date/Time Weight    08/14/23 0905 71.7 (158)          Physical Exam:  Physical Exam  Vitals reviewed. Constitutional:       General: She is not in acute distress. Appearance: She is obese. She is not diaphoretic. HENT:      Head: Normocephalic and atraumatic. Eyes:      General:         Right eye: No discharge. Left eye: No discharge. Neck:      Comments: Trachea midline, neck obese, difficult to assess JVD  Cardiovascular:      Rate and Rhythm: Normal rate and regular rhythm. Heart sounds: No friction rub. Pulmonary:      Effort: No respiratory distress. Breath sounds: No wheezing. Comments: Decreased breath sounds bilaterally  Chest:      Chest wall: No tenderness. Abdominal:      General: Bowel sounds are normal.      Palpations: Abdomen is soft. Tenderness: There is no abdominal tenderness. There is no rebound. Musculoskeletal:      Right lower leg: No edema. Left lower leg: No edema. Skin:     General: Skin is warm and dry. Neurological:      Mental Status: She is alert. Comments: Awake, alert, able to answer questions appropriately, able to move extremities bilaterally.    Psychiatric:         Mood and Affect: Mood normal.         Behavior: Behavior normal.         Medications:      Current Facility-Administered Medications:   •  acetaminophen (TYLENOL) tablet 650 mg, 650 mg, Oral, Q4H PRN, Sharon Moyer PA-C  •  albuterol inhalation solution 2.5 mg, 2.5 mg, Nebulization, Q4H PRN, Kj Puga MD  •  aspirin chewable tablet 81 mg, 81 mg, Oral, Daily, Sharon Moyer PA-C, 81 mg at 08/14/23 0883  •  atorvastatin (LIPITOR) tablet 10 mg, 10 mg, Oral, Daily With Trinity Austin DO, 10 mg at 08/13/23 1625  •  buPROPion (WELLBUTRIN XL) 24 hr tablet 300 mg, 300 mg, Oral, Daily, Sharon Moyer PA-C, 300 mg at 08/14/23 4511  •  cefTRIAXone (ROCEPHIN) IVPB (premix in dextrose) 2,000 mg 50 mL, 2,000 mg, Intravenous, Q24H, Pam Ambrocio PA-C, Last Rate: 100 mL/hr at 08/14/23 0504, 2,000 mg at 08/14/23 0504  •  clonazePAM (KlonoPIN) tablet 1 mg, 1 mg, Oral, HS, Pam Ambrocio PA-C, 1 mg at 08/13/23 2125  •  enoxaparin (LOVENOX) subcutaneous injection 40 mg, 40 mg, Subcutaneous, Daily, Sharon Moyer PA-C, 40 mg at 08/14/23 4607  •  fish oil capsule 1,000 mg, 1,000 mg, Oral, Daily, Jr Ambrocio PA-C, 1,000 mg at 08/14/23 3079  •  insulin glargine (LANTUS) subcutaneous injection 10 Units 0.1 mL, 10 Units, Subcutaneous, LEILA, Kj Puga MD, 10 Units at 08/13/23 2122  •  insulin lispro (HumaLOG) 100 units/mL subcutaneous injection 1-5 Units, 1-5 Units, Subcutaneous, TID AC, 3 Units at 08/13/23 1625 **AND** Fingerstick Glucose (POCT), , , TID AC, Pam Ambrocio PA-C  •  insulin lispro (HumaLOG) 100 units/mL subcutaneous injection 1-5 Units, 1-5 Units, Subcutaneous, HS, [employer], PA-C, 1 Units at 08/13/23 2125  •  lisinopril (ZESTRIL) tablet 2.5 mg, 2.5 mg, Oral, Daily, Mone Davis DO, 2.5 mg at 08/14/23 4698  •  nicotine (NICODERM CQ) 21 mg/24 hr TD 24 hr patch 1 patch, 1 patch, Transdermal, Daily, Pam Ambrocio PA-C  •  ondansetron [facility]) injection 4 mg, 4 mg, Intravenous, Q6H PRN, [employer], PA-C  •  pantoprazole (PROTONIX) EC tablet 40 mg, 40 mg, Oral, Daily, [employer], PA-C, 40 mg at 08/14/23 5765  •  potassium chloride (K-DUR,KLOR-CON) CR tablet 20 mEq, 20 mEq, Oral, Daily With Breakfast, Pam Ambrocio PA-C, 20 mEq at 08/14/23 5220  •  predniSONE tablet 40 mg, 40 mg, Oral, Daily, [employer], PA-C, 40 mg at 08/14/23 8975  •  pregabalin (LYRICA) capsule 200 mg, 200 mg, Oral, TID, Pam Ambrocio PA-C, 200 mg at 08/14/23 2031  •  sucralfate (CARAFATE) tablet 1 g, 1 g, Oral, 4x Daily, [employer], PA-C, 1 g at 08/14/23 0826     Labs & Results:    Troponins:    Results from last 7 days   Lab Units 08/11/23  0430 08/11/23  0251 08/11/23  0025   HS TNI 0HR ng/L  --   --  33   HS TNI 2HR ng/L  --  107*  --    HSTNI D2 ng/L  --  74*  --    HS TNI 4HR ng/L 129*  --   --    HSTNI D4 ng/L 96*  --   --         BNP:     CBC with diff:   Results from last 7 days   Lab Units 08/13/23  0531 08/12/23  0425   WBC Thousand/uL 8.92 10.14   HEMOGLOBIN g/dL 12.4 12.6   HEMATOCRIT % 36.8 37.4   MCV fL 94 94   PLATELETS Thousands/uL 274 199     TSH:     CMP:   Results from last 7 days   Lab Units 08/14/23  0504 08/13/23  0531 08/11/23  0859 08/11/23  0025   POTASSIUM mmol/L 4.2 3.9   < > 2.6*   CHLORIDE mmol/L 98 97   < > 92*   CO2 mmol/L 34* 33*   < > 32   BUN mg/dL 10 11   < > 12   CREATININE mg/dL 0.49* 0.47*   < > 0.59*   AST U/L 13  --   --  62*   ALT U/L 11  --   --  21   EGFR ml/min/1.73sq m 94 95   < > 88    < > = values in this interval not displayed.      Lipid Profile:     Coags:   Results from last 7 days   Lab Units 08/11/23  0025   INR  1.08

## 2023-08-14 NOTE — PROGRESS NOTES
Progress Note - Maci Scott 69 y.o. female MRN: [id number]    Unit/Bed#: -01 Encounter: [id number]      Assessment:  Voiding well with tea colored urine. Urine cytology pending. Discussed with the hospitalist and echocardiogram was unremarkable and she was cleared by pulmonary for urologic procedure. Plan:  Plan outpatient cystoscopy with retrograde urogram, ureteroscopy and possible biopsy. I will follow-up with her as an outpatient. Subjective:   Voiding well without difficulty or complaint    Objective:     Vitals: Blood pressure 115/71, pulse 74, temperature 98.3 °F (36.8 °C), resp. rate 19, height 5' 1" (1.549 m), weight 71.7 kg (158 lb), SpO2 92 %. ,Body mass index is 29.85 kg/m². Intake/Output Summary (Last 24 hours) at 8/14/2023 1105  Last data filed at 8/14/2023 0530  Gross per 24 hour   Intake 720 ml   Output 400 ml   Net 320 ml       Physical Exam: No abdominal or flank tenderness. Urine is tea colored. Invasive Devices     Peripheral Intravenous Line  Duration           Peripheral IV 08/11/23 Left Antecubital 3 days    Peripheral IV 08/11/23 Right Antecubital 3 days                Lab, Imaging and other studies: I have personally reviewed pertinent reports.     VTE Pharmacologic Prophylaxis: Sequential compression device (Venodyne)   VTE Mechanical Prophylaxis: sequential compression device

## 2023-08-14 NOTE — ASSESSMENT & PLAN NOTE
· None myocardial injury/ischemia related elevated troponin secondary to acute illness/hypoxia   · Appreciate cardiology input  · Patient remains asymptomatic from a cardiac standpoint-denies any chest pain  · Case reviewed with Dr. Minesh Christy- patient to remain in house until Monday, 8/14/2023 for 2D echocardiogram evaluation  · Continue current cardiac based medications include aspirin 81 mg p.o. daily, lisinopril 2.5 mg p.o. daily and atorvastatin 10 mg daily  · Per cardiology patient cleared from cardiac standpoint for discharge.   Echo results reviewed

## 2023-08-14 NOTE — ASSESSMENT & PLAN NOTE
· Has resolved  · Highest oxygen requirement was 3.5 L of supplemental oxygen via nasal cannula, currently patient is on room air    · Patient has greater than a 65-pack-year smoking history -but does not carry a formal diagnosis of COPD -current symptoms secondary to a suspected bronchitis/COPD exacerbation  · Appreciate pulmonary input  · Will discharge on prednisone and cefdinir to complete course of therapy  · Per pulmonology we will also discharged on albuterol inhaler as well as Symbicort  · Follow-up with pulmonology as outpatient  · Flu/covid/rsv negative  · Will need repeat chest CT scan in 2 months per pulmonology to follow-up CT findings

## 2023-08-14 NOTE — PLAN OF CARE
Problem: MOBILITY - ADULT  Goal: Maintain or return to baseline ADL function  Description: INTERVENTIONS:  -  Assess patient's ability to carry out ADLs; assess patient's baseline for ADL function and identify physical deficits which impact ability to perform ADLs (bathing, care of mouth/teeth, toileting, grooming, dressing, etc.)  - Assess/evaluate cause of self-care deficits   - Assess range of motion  - Assess patient's mobility; develop plan if impaired  - Assess patient's need for assistive devices and provide as appropriate  - Encourage maximum independence but intervene and supervise when necessary  - Involve family in performance of ADLs  - Assess for home care needs following discharge   - Consider OT consult to assist with ADL evaluation and planning for discharge  - Provide patient education as appropriate  Outcome: Progressing  Patient ambulates around the room

## 2023-08-14 NOTE — PLAN OF CARE
Problem: PAIN - ADULT  Goal: Verbalizes/displays adequate comfort level or baseline comfort level  Description: Interventions:  - Encourage patient to monitor pain and request assistance  - Assess pain using appropriate pain scale  - Administer analgesics based on type and severity of pain and evaluate response  - Implement non-pharmacological measures as appropriate and evaluate response  - Consider cultural and social influences on pain and pain management  - Notify physician/advanced practitioner if interventions unsuccessful or patient reports new pain  Outcome: Progressing     Problem: INFECTION - ADULT  Goal: Absence or prevention of progression during hospitalization  Description: INTERVENTIONS:  - Assess and monitor for signs and symptoms of infection  - Monitor lab/diagnostic results  - Monitor all insertion sites, i.e. indwelling lines, tubes, and drains  - Monitor endotracheal if appropriate and nasal secretions for changes in amount and color  - Eloy appropriate cooling/warming therapies per order  - Administer medications as ordered  - Instruct and encourage patient and family to use good hand hygiene technique  - Identify and instruct in appropriate isolation precautions for identified infection/condition  Outcome: Progressing  Goal: Absence of fever/infection during neutropenic period  Description: INTERVENTIONS:  - Monitor WBC    Outcome: Progressing     Problem: SAFETY ADULT  Goal: Patient will remain free of falls  Description: INTERVENTIONS:  - Educate patient/family on patient safety including physical limitations  - Instruct patient to call for assistance with activity   - Consult OT/PT to assist with strengthening/mobility   - Keep Call bell within reach  - Keep bed low and locked with side rails adjusted as appropriate  - Keep care items and personal belongings within reach  - Initiate and maintain comfort rounds  - Make Fall Risk Sign visible to staff  - Offer Toileting every 2 Hours, in advance of need  - Initiate/Maintain bed alarm  - Obtain necessary fall risk management equipment  - Apply yellow socks and bracelet for high fall risk patients  - Consider moving patient to room near nurses station  Outcome: Progressing  Goal: Maintain or return to baseline ADL function  Description: INTERVENTIONS:  -  Assess patient's ability to carry out ADLs; assess patient's baseline for ADL function and identify physical deficits which impact ability to perform ADLs (bathing, care of mouth/teeth, toileting, grooming, dressing, etc.)  - Assess/evaluate cause of self-care deficits   - Assess range of motion  - Assess patient's mobility; develop plan if impaired  - Assess patient's need for assistive devices and provide as appropriate  - Encourage maximum independence but intervene and supervise when necessary  - Involve family in performance of ADLs  - Assess for home care needs following discharge   - Consider OT consult to assist with ADL evaluation and planning for discharge  - Provide patient education as appropriate  Outcome: Progressing     Problem: DISCHARGE PLANNING  Goal: Discharge to home or other facility with appropriate resources  Description: INTERVENTIONS:  - Identify barriers to discharge w/patient and caregiver  - Arrange for needed discharge resources and transportation as appropriate  - Identify discharge learning needs (meds, wound care, etc.)  - Arrange for interpretive services to assist at discharge as needed  - Refer to Case Management Department for coordinating discharge planning if the patient needs post-hospital services based on physician/advanced practitioner order or complex needs related to functional status, cognitive ability, or social support system  Outcome: Progressing     Problem: Knowledge Deficit  Goal: Patient/family/caregiver demonstrates understanding of disease process, treatment plan, medications, and discharge instructions  Description: Complete learning assessment and assess knowledge base.   Interventions:  - Provide teaching at level of understanding  - Provide teaching via preferred learning methods  Outcome: Progressing     Problem: Prexisting or High Potential for Compromised Skin Integrity  Goal: Skin integrity is maintained or improved  Description: INTERVENTIONS:  - Identify patients at risk for skin breakdown  - Assess and monitor skin integrity  - Assess and monitor nutrition and hydration status  - Monitor labs   - Assess for incontinence   - Turn and reposition patient  - Assist with mobility/ambulation  - Relieve pressure over bony prominences  - Avoid friction and shearing  - Provide appropriate hygiene as needed including keeping skin clean and dry  - Evaluate need for skin moisturizer/barrier cream  - Collaborate with interdisciplinary team   - Patient/family teaching  - Consider wound care consult   Outcome: Progressing     Problem: MOBILITY - ADULT  Goal: Maintain or return to baseline ADL function  Description: INTERVENTIONS:  -  Assess patient's ability to carry out ADLs; assess patient's baseline for ADL function and identify physical deficits which impact ability to perform ADLs (bathing, care of mouth/teeth, toileting, grooming, dressing, etc.)  - Assess/evaluate cause of self-care deficits   - Assess range of motion  - Assess patient's mobility; develop plan if impaired  - Assess patient's need for assistive devices and provide as appropriate  - Encourage maximum independence but intervene and supervise when necessary  - Involve family in performance of ADLs  - Assess for home care needs following discharge   - Consider OT consult to assist with ADL evaluation and planning for discharge  - Provide patient education as appropriate  Outcome: Progressing  Goal: Maintains/Returns to pre admission functional level  Description: INTERVENTIONS:  - Perform BMAT or MOVE assessment daily.   - Set and communicate daily mobility goal to care team and patient/family/caregiver. - Collaborate with rehabilitation services on mobility goals if consulted  - Perform Range of Motion 3 times a day. - Reposition patient every 2 hours.   - Dangle patient 3 times a day  - Stand patient 3 times a day  - Ambulate patient 3 times a day  - Out of bed to chair 3 times a day   - Out of bed for meals 3 times a day  - Out of bed for toileting  - Record patient progress and toleration of activity level   Outcome: Progressing

## 2023-08-14 NOTE — ASSESSMENT & PLAN NOTE
Lab Results   Component Value Date    HGBA1C 6.1 (H) 07/05/2023       Recent Labs     08/13/23  1127 08/13/23  1602 08/13/23  2108 08/14/23  0636   POCGLU 155* 286* 171* 132       Blood Sugar Average: Last 72 hrs:  · (P) [id number]   · Continue home diabetic regimen

## 2023-08-15 NOTE — UTILIZATION REVIEW
NOTIFICATION OF ADMISSION DISCHARGE   This is a Notification of Discharge from [facility]. Please be advised that this patient has been discharge from our facility. Below you will find the admission and discharge date and time including the patient’s disposition. UTILIZATION REVIEW CONTACT:  Carol Ann Espinosa  Utilization   Network Utilization Review Department  Phone: [phone] carefully listen to the prompts. All voicemails are confidential.  Email: [email or web address]     ADMISSION INFORMATION  PRESENTATION DATE: 8/11/2023 12:03 AM  OBERVATION ADMISSION DATE:   INPATIENT ADMISSION DATE: 8/11/23  4:16 AM   DISCHARGE DATE: 8/14/2023 12:52 PM   DISPOSITION:Home/Self Care    IMPORTANT INFORMATION:  Send all requests for admission clinical reviews, approved or denied determinations and any other requests to dedicated fax number below belonging to the campus where the patient is receiving treatment.  List of dedicated fax numbers:  [place] DENIALS (Administrative/Medical Necessity) [phone] [address] [facility] (Maternity/NICU/Pediatrics) [phone]   [facility] [phone]   [facility] [phone] [address] [phone]   [address] [place] [address] [phone]   [facility] [phone]   [address] [place] [address] [phone]   [address] [place] [address] [phone]   [address] [facility] [phone] [address] [facility] [address] [facility] [phone]

## 2023-08-16 LAB
BACTERIA BLD CULT: NORMAL
BACTERIA BLD CULT: NORMAL

## 2023-08-16 PROCEDURE — 88112 CYTOPATH CELL ENHANCE TECH: CPT | Performed by: STUDENT IN AN ORGANIZED HEALTH CARE EDUCATION/TRAINING PROGRAM

## 2023-08-21 ENCOUNTER — OFFICE VISIT (OUTPATIENT)
Dept: FAMILY MEDICINE CLINIC | Facility: CLINIC | Age: 78
End: 2023-08-21
Payer: COMMERCIAL

## 2023-08-21 VITALS
BODY MASS INDEX: 29.72 KG/M2 | RESPIRATION RATE: 18 BRPM | HEIGHT: 61 IN | OXYGEN SATURATION: 95 % | HEART RATE: 75 BPM | SYSTOLIC BLOOD PRESSURE: 112 MMHG | DIASTOLIC BLOOD PRESSURE: 68 MMHG | TEMPERATURE: 98.3 F | WEIGHT: 157.4 LBS

## 2023-08-21 DIAGNOSIS — K21.9 GERD WITHOUT ESOPHAGITIS: Chronic | ICD-10-CM

## 2023-08-21 DIAGNOSIS — J45.21 MILD INTERMITTENT REACTIVE AIRWAY DISEASE WITH ACUTE EXACERBATION: Primary | ICD-10-CM

## 2023-08-21 DIAGNOSIS — F41.9 ANXIETY AND DEPRESSION: ICD-10-CM

## 2023-08-21 DIAGNOSIS — N39.46 MIXED STRESS AND URGE URINARY INCONTINENCE: ICD-10-CM

## 2023-08-21 DIAGNOSIS — E11.42 TYPE 2 DIABETES MELLITUS WITH DIABETIC POLYNEUROPATHY, WITHOUT LONG-TERM CURRENT USE OF INSULIN (HCC): ICD-10-CM

## 2023-08-21 DIAGNOSIS — R09.02 HYPOXIA: ICD-10-CM

## 2023-08-21 DIAGNOSIS — I10 ESSENTIAL HYPERTENSION: ICD-10-CM

## 2023-08-21 DIAGNOSIS — E11.65 UNCONTROLLED TYPE 2 DIABETES MELLITUS WITH HYPERGLYCEMIA (HCC): ICD-10-CM

## 2023-08-21 DIAGNOSIS — F32.A ANXIETY AND DEPRESSION: ICD-10-CM

## 2023-08-21 PROCEDURE — 99496 TRANSJ CARE MGMT HIGH F2F 7D: CPT | Performed by: FAMILY MEDICINE

## 2023-08-21 RX ORDER — ATORVASTATIN CALCIUM 10 MG/1
10 TABLET, FILM COATED ORAL
Qty: 30 TABLET | Refills: 3 | Status: SHIPPED | OUTPATIENT
Start: 2023-08-21

## 2023-08-21 RX ORDER — SUCRALFATE 1 G/1
1 TABLET ORAL 4 TIMES DAILY
Qty: 120 TABLET | Refills: 3 | Status: SHIPPED | OUTPATIENT
Start: 2023-08-21

## 2023-08-21 RX ORDER — BUPROPION HYDROCHLORIDE 300 MG/1
300 TABLET ORAL DAILY
Qty: 90 TABLET | Refills: 1 | Status: SHIPPED | OUTPATIENT
Start: 2023-08-21

## 2023-08-21 RX ORDER — SOLIFENACIN SUCCINATE 5 MG/1
5 TABLET, FILM COATED ORAL DAILY
Qty: 90 TABLET | Refills: 2 | Status: SHIPPED | OUTPATIENT
Start: 2023-08-21

## 2023-08-21 RX ORDER — PREGABALIN 200 MG/1
CAPSULE ORAL
Qty: 90 CAPSULE | Refills: 2 | Status: SHIPPED | OUTPATIENT
Start: 2023-08-21

## 2023-08-21 RX ORDER — CLONAZEPAM 1 MG/1
1 TABLET ORAL EVERY EVENING
Qty: 30 TABLET | Refills: 3 | Status: SHIPPED | OUTPATIENT
Start: 2023-08-21

## 2023-08-21 RX ORDER — SITAGLIPTIN 100 MG/1
100 TABLET, FILM COATED ORAL DAILY
Qty: 90 TABLET | Refills: 2 | Status: SHIPPED | OUTPATIENT
Start: 2023-08-21

## 2023-08-21 RX ORDER — LISINOPRIL 2.5 MG/1
2.5 TABLET ORAL DAILY
Qty: 30 TABLET | Refills: 3 | Status: SHIPPED | OUTPATIENT
Start: 2023-08-21

## 2023-08-21 RX ORDER — METFORMIN HYDROCHLORIDE 500 MG/1
1000 TABLET, EXTENDED RELEASE ORAL 2 TIMES DAILY
Qty: 360 TABLET | Refills: 1 | Status: SHIPPED | OUTPATIENT
Start: 2023-08-21

## 2023-08-21 NOTE — PROGRESS NOTES
Assessment & Plan     1. Mild intermittent reactive airway disease with acute exacerbation  Assessment & Plan:  Post hospitalization doing better overall but needs asistance with generic Advair inhaler. 2. Hypoxia  -     atorvastatin (LIPITOR) 10 mg tablet; Take 1 tablet (10 mg total) by mouth daily with dinner    3. Anxiety and depression  -     buPROPion (WELLBUTRIN XL) 300 mg 24 hr tablet; Take 1 tablet (300 mg total) by mouth daily  -     clonazePAM (KlonoPIN) 1 mg tablet; Take 1 tablet (1 mg total) by mouth every evening    4. Essential hypertension  Assessment & Plan:  Stable now on current medications and follow up as scheduled-meds changed at hospital.    Orders:  -     Januvia 100 MG tablet; Take 1 tablet (100 mg total) by mouth daily  -     lisinopril (ZESTRIL) 2.5 mg tablet; Take 1 tablet (2.5 mg total) by mouth daily    5. Type 2 diabetes mellitus with diabetic polyneuropathy, without long-term current use of insulin (HCC)  Assessment & Plan:  Stable re check labs at next OV  Lab Results   Component Value Date    HGBA1C 6.1 (H) 07/05/2023       Orders:  -     Januvia 100 MG tablet; Take 1 tablet (100 mg total) by mouth daily  -     pregabalin (LYRICA) 200 MG capsule; TAKE ONE CAPSULE BY MOUTH THREE TIMES A DAY    6. Uncontrolled type 2 diabetes mellitus with hyperglycemia (HCC)  -     metFORMIN (GLUCOPHAGE-XR) 500 mg 24 hr tablet; Take 2 tablets (1,000 mg total) by mouth 2 (two) times a day    7. Mixed stress and urge urinary incontinence  -     solifenacin (VESICARE) 5 mg tablet; Take 1 tablet (5 mg total) by mouth daily    8. GERD without esophagitis  -     sucralfate (CARAFATE) 1 g tablet; Take 1 tablet (1 g total) by mouth 4 (four) times a day       Subjective     Transitional Care Management Review: Brianna Denson is a 69 y.o. female here for TCM follow up.      During the TCM phone call patient stated:  TCM Call     Date and time call was made  8/14/2023  5:09 PM    Hospital care reviewed Records reviewed    Patient was hospitialized at  [address] [facility]    Date of Admission  08/11/23    Date of discharge  08/14/23    Diagnosis  Reactive airway disease with acute exacerbation    Disposition  Home    Were the patients medications reviewed and updated  No    Current Symptoms  None      TCM Call     Post hospital issues  None    Should patient be enrolled in anticoag monitoring? No    Scheduled for follow up? Yes    Did you obtain your prescribed medications  Yes    Do you need help managing your prescriptions or medications  No    Is transportation to your appointment needed  No    I have advised the patient to call PCP with any new or worsening symptoms  ehsan harrington        Post hospital evaluation SOB and mental status change    Review of Systems   Constitutional: Negative for chills, fatigue and fever. HENT: Negative for congestion, nosebleeds, rhinorrhea, sinus pressure and sore throat. Eyes: Negative for discharge and redness. Respiratory: Positive for shortness of breath. Negative for cough. Cardiovascular: Negative for chest pain, palpitations and leg swelling. Gastrointestinal: Negative for abdominal pain, blood in stool and nausea. Endocrine: Negative for cold intolerance, heat intolerance and polyuria. Genitourinary: Negative for dysuria and frequency. Musculoskeletal: Negative for arthralgias, back pain and myalgias. Skin: Negative for rash. Neurological: Negative for dizziness, weakness and headaches. Hematological: Negative for adenopathy. Psychiatric/Behavioral: Negative for behavioral problems and sleep disturbance. The patient is not nervous/anxious. Objective     /68 (BP Location: Right arm, Patient Position: Sitting, Cuff Size: Large)   Pulse 75   Temp 98.3 °F (36.8 °C) (Tympanic)   Resp 18   Ht 5' 1" (1.549 m)   Wt 71.4 kg (157 lb 6.4 oz)   SpO2 95%   BMI 29.74 kg/m²      Physical Exam  Vitals and nursing note reviewed. Constitutional:       General: She is not in acute distress. Appearance: Normal appearance. She is well-developed and normal weight. HENT:      Head: Normocephalic and atraumatic. Right Ear: Tympanic membrane, ear canal and external ear normal.      Left Ear: Tympanic membrane, ear canal and external ear normal.      Nose: Nose normal.      Mouth/Throat:      Mouth: Mucous membranes are moist.      Pharynx: Oropharynx is clear. Eyes:      Extraocular Movements: Extraocular movements intact. Conjunctiva/sclera: Conjunctivae normal.      Pupils: Pupils are equal, round, and reactive to light. Cardiovascular:      Rate and Rhythm: Normal rate and regular rhythm. Pulses: Normal pulses. Heart sounds: Normal heart sounds. No murmur heard. Pulmonary:      Effort: Pulmonary effort is normal. No respiratory distress. Breath sounds: Normal breath sounds. Abdominal:      Palpations: Abdomen is soft. Tenderness: There is no abdominal tenderness. Musculoskeletal:         General: No swelling. Normal range of motion. Cervical back: Normal range of motion and neck supple. Skin:     General: Skin is warm and dry. Capillary Refill: Capillary refill takes less than 2 seconds. Neurological:      General: No focal deficit present. Mental Status: She is alert. Mental status is at baseline. Psychiatric:         Mood and Affect: Mood normal.         Thought Content:  Thought content normal.         Judgment: Judgment normal.       Medications have been reviewed by provider in current encounter    Shady Jaramillo DO

## 2023-08-29 ENCOUNTER — HOSPITAL ENCOUNTER (OUTPATIENT)
Dept: BONE DENSITY | Facility: HOSPITAL | Age: 78
Discharge: HOME | End: 2023-08-29
Payer: COMMERCIAL

## 2023-08-29 VITALS — BODY MASS INDEX: 29.76 KG/M2 | HEIGHT: 61 IN | WEIGHT: 157.63 LBS

## 2023-08-29 DIAGNOSIS — S32.2XXA CLOSED FRACTURE OF COCCYX, INITIAL ENCOUNTER (HCC): ICD-10-CM

## 2023-08-29 PROCEDURE — 77080 DXA BONE DENSITY AXIAL: CPT

## 2023-09-06 ENCOUNTER — APPOINTMENT (OUTPATIENT)
Dept: LAB | Facility: CLINIC | Age: 78
End: 2023-09-06
Payer: COMMERCIAL

## 2023-09-06 DIAGNOSIS — R32 URINARY INCONTINENCE, UNSPECIFIED TYPE: ICD-10-CM

## 2023-09-06 DIAGNOSIS — I10 ESSENTIAL HYPERTENSION: ICD-10-CM

## 2023-09-06 DIAGNOSIS — E11.42 TYPE 2 DIABETES MELLITUS WITH DIABETIC POLYNEUROPATHY, WITHOUT LONG-TERM CURRENT USE OF INSULIN (HCC): ICD-10-CM

## 2023-09-06 DIAGNOSIS — N39.0 URINARY TRACT INFECTION WITHOUT HEMATURIA, SITE UNSPECIFIED: ICD-10-CM

## 2023-09-06 DIAGNOSIS — Z01.818 PRE-OP TESTING: ICD-10-CM

## 2023-09-06 DIAGNOSIS — E11.65 UNCONTROLLED TYPE 2 DIABETES MELLITUS WITH HYPERGLYCEMIA (HCC): ICD-10-CM

## 2023-09-06 DIAGNOSIS — R31.0 GROSS HEMATURIA: ICD-10-CM

## 2023-09-06 LAB
ALBUMIN SERPL BCP-MCNC: 3.7 G/DL (ref 3.5–5)
ALP SERPL-CCNC: 44 U/L (ref 34–104)
ALT SERPL W P-5'-P-CCNC: 6 U/L (ref 7–52)
ANION GAP SERPL CALCULATED.3IONS-SCNC: 8 MMOL/L
AST SERPL W P-5'-P-CCNC: 14 U/L (ref 13–39)
BACTERIA UR QL AUTO: ABNORMAL /HPF
BILIRUB SERPL-MCNC: 0.22 MG/DL (ref 0.2–1)
BILIRUB UR QL STRIP: NEGATIVE
BUN SERPL-MCNC: 8 MG/DL (ref 5–25)
CALCIUM SERPL-MCNC: 9.5 MG/DL (ref 8.4–10.2)
CHLORIDE SERPL-SCNC: 100 MMOL/L (ref 96–108)
CLARITY UR: CLEAR
CO2 SERPL-SCNC: 32 MMOL/L (ref 21–32)
COLOR UR: ABNORMAL
CREAT SERPL-MCNC: 0.5 MG/DL (ref 0.6–1.3)
ERYTHROCYTE [DISTWIDTH] IN BLOOD BY AUTOMATED COUNT: 13.9 % (ref 11.6–15.1)
GFR SERPL CREATININE-BSD FRML MDRD: 93 ML/MIN/1.73SQ M
GLUCOSE P FAST SERPL-MCNC: 122 MG/DL (ref 65–99)
GLUCOSE UR STRIP-MCNC: NEGATIVE MG/DL
HCT VFR BLD AUTO: 44 % (ref 34.8–46.1)
HGB BLD-MCNC: 14.3 G/DL (ref 11.5–15.4)
HGB UR QL STRIP.AUTO: ABNORMAL
KETONES UR STRIP-MCNC: NEGATIVE MG/DL
LEUKOCYTE ESTERASE UR QL STRIP: NEGATIVE
MCH RBC QN AUTO: 31.1 PG (ref 26.8–34.3)
MCHC RBC AUTO-ENTMCNC: 32.5 G/DL (ref 31.4–37.4)
MCV RBC AUTO: 96 FL (ref 82–98)
NITRITE UR QL STRIP: NEGATIVE
NON-SQ EPI CELLS URNS QL MICRO: ABNORMAL /HPF
PH UR STRIP.AUTO: 6 [PH]
PLATELET # BLD AUTO: 291 THOUSANDS/UL (ref 149–390)
PMV BLD AUTO: 9.9 FL (ref 8.9–12.7)
POTASSIUM SERPL-SCNC: 4.2 MMOL/L (ref 3.5–5.3)
PROT SERPL-MCNC: 6.1 G/DL (ref 6.4–8.4)
PROT UR STRIP-MCNC: ABNORMAL MG/DL
RBC # BLD AUTO: 4.6 MILLION/UL (ref 3.81–5.12)
RBC #/AREA URNS AUTO: ABNORMAL /HPF
SODIUM SERPL-SCNC: 140 MMOL/L (ref 135–147)
SP GR UR STRIP.AUTO: 1.01 (ref 1–1.03)
UROBILINOGEN UR STRIP-ACNC: <2 MG/DL
WBC # BLD AUTO: 6.76 THOUSAND/UL (ref 4.31–10.16)
WBC #/AREA URNS AUTO: ABNORMAL /HPF

## 2023-09-06 PROCEDURE — 87086 URINE CULTURE/COLONY COUNT: CPT

## 2023-09-06 PROCEDURE — 36415 COLL VENOUS BLD VENIPUNCTURE: CPT

## 2023-09-06 PROCEDURE — 80053 COMPREHEN METABOLIC PANEL: CPT

## 2023-09-06 PROCEDURE — 85027 COMPLETE CBC AUTOMATED: CPT

## 2023-09-06 PROCEDURE — 81001 URINALYSIS AUTO W/SCOPE: CPT

## 2023-09-07 LAB — BACTERIA UR CULT: NORMAL

## 2023-09-11 ENCOUNTER — OFFICE VISIT (OUTPATIENT)
Dept: FAMILY MEDICINE CLINIC | Facility: CLINIC | Age: 78
End: 2023-09-11
Payer: COMMERCIAL

## 2023-09-11 VITALS
SYSTOLIC BLOOD PRESSURE: 144 MMHG | DIASTOLIC BLOOD PRESSURE: 72 MMHG | HEIGHT: 61 IN | RESPIRATION RATE: 18 BRPM | WEIGHT: 158 LBS | TEMPERATURE: 97.6 F | HEART RATE: 77 BPM | OXYGEN SATURATION: 96 % | BODY MASS INDEX: 29.83 KG/M2

## 2023-09-11 DIAGNOSIS — D49.4 BLADDER NEOPLASM: ICD-10-CM

## 2023-09-11 DIAGNOSIS — M51.36 DEGENERATIVE LUMBAR DISC: ICD-10-CM

## 2023-09-11 DIAGNOSIS — I10 ESSENTIAL HYPERTENSION: ICD-10-CM

## 2023-09-11 DIAGNOSIS — J45.21 MILD INTERMITTENT REACTIVE AIRWAY DISEASE WITH ACUTE EXACERBATION: ICD-10-CM

## 2023-09-11 DIAGNOSIS — R31.9 HEMATURIA, UNSPECIFIED TYPE: ICD-10-CM

## 2023-09-11 DIAGNOSIS — E11.42 TYPE 2 DIABETES MELLITUS WITH DIABETIC POLYNEUROPATHY, WITHOUT LONG-TERM CURRENT USE OF INSULIN (HCC): Chronic | ICD-10-CM

## 2023-09-11 DIAGNOSIS — R32 URINARY INCONTINENCE, UNSPECIFIED TYPE: ICD-10-CM

## 2023-09-11 DIAGNOSIS — Z01.818 PREOPERATIVE EXAMINATION: Primary | ICD-10-CM

## 2023-09-11 DIAGNOSIS — N28.89 LEFT RENAL MASS: ICD-10-CM

## 2023-09-11 DIAGNOSIS — K21.9 GERD WITHOUT ESOPHAGITIS: Chronic | ICD-10-CM

## 2023-09-11 PROCEDURE — 99215 OFFICE O/P EST HI 40 MIN: CPT | Performed by: FAMILY MEDICINE

## 2023-09-11 PROCEDURE — 93000 ELECTROCARDIOGRAM COMPLETE: CPT | Performed by: FAMILY MEDICINE

## 2023-09-11 NOTE — PROGRESS NOTES
FAMILY MEDICINE PRE-OPERATIVE EVALUATION  [facility]YS[facility]IAN [facility] - [facility] [address] [facility] PR[facility]    NAME: Wilmar Cotto  AGE: 69 y.o. SEX: female  : 1945     DATE: 2023     Family Medicine Pre-Operative Evaluation:     Chief Complaint: Pre-operative Evaluation     Surgery: Cystoscopy retrograde ureteroscopy possible biopsy possible laser ablation left bladder  Anticipated Date of Surgery: 2023  Referring Provider: Dann Juárez MD        History of Present Illness: Wilmar Cotto is a 69 y.o. female who presents to the office today for a preoperative consultation at the request of surgeon, Bay Lynch MD, who plans on performing cystoscopy retrograde ureteroscopy possible biopsy with laser ablation on 2023. planned anesthesia is general. Patient has a bleeding risk of: no recent abnormal bleeding. Patient does not have objections to receiving blood products if needed. Current anti-platelet/anti-coagulation medications that the patient is prescribed includes: Aspirin --the patient will stop as of 5 days prior to surgery as well as omega-3 fish oils     Assessment of Chronic Conditions:   - Diabetes Mellitus: To hold all medications day of surgery          Review of Systems:     Review of Systems   Constitutional: Negative for chills, fatigue and fever. HENT: Negative for congestion, nosebleeds, rhinorrhea, sinus pressure and sore throat. Eyes: Negative for discharge and redness. Respiratory: Negative for cough and shortness of breath. Cardiovascular: Negative for chest pain, palpitations and leg swelling. Gastrointestinal: Negative for abdominal pain, blood in stool and nausea. Endocrine: Negative for cold intolerance, heat intolerance and polyuria. Genitourinary: Negative for dysuria and frequency. Musculoskeletal: Negative for arthralgias, back pain and myalgias. Skin: Negative for rash.    Neurological: Negative for dizziness, weakness and headaches. Hematological: Negative for adenopathy. Psychiatric/Behavioral: Negative for behavioral problems and sleep disturbance. The patient is not nervous/anxious. Problem List:     Patient Active Problem List   Diagnosis   • Type 2 diabetes mellitus with diabetic polyneuropathy ([address])   • Essential hypertension   • Anxiety and depression   • Medicare annual wellness visit, subsequent   • GERD without esophagitis   • Reactive airway disease with acute exacerbation   • Urinary incontinence   • Degenerative lumbar disc   • Hypokalemia   • Hematuria   • Elevated troponin   • Lactic acidosis   • Hypomagnesemia   • Preoperative examination   • Bladder neoplasm   • Left renal mass        Allergies:      Allergies   Allergen Reactions   • Adhesive [Medical Tape] Itching   • Latex    • Paroxetine    • Sulfa Antibiotics    • Sulfamethoxazole-Trimethoprim Hives        Current Medications:       Current Outpatient Medications:   •  acetaminophen (TYLENOL) 325 mg tablet, Take 650 mg by mouth every 6 (six) hours as needed for mild pain, Disp: , Rfl:   •  albuterol (Ventolin HFA) 90 mcg/act inhaler, Inhale 2 puffs every 6 (six) hours as needed for wheezing, Disp: 18 g, Rfl: 0  •  Apoaequorin (Prevagen) 10 MG CAPS, Take by mouth, Disp: , Rfl:   •  Aspirin (ASPIR-81 PO), take one by mouth daily, Disp: , Rfl:   •  atorvastatin (LIPITOR) 10 mg tablet, Take 1 tablet (10 mg total) by mouth daily with dinner, Disp: 30 tablet, Rfl: 3  •  Blood Glucose Monitoring Suppl (ONETOUCH VERIO) w/Device KIT, by Does not apply route 2 (two) times a day, Disp: 1 kit, Rfl: 0  •  buPROPion (WELLBUTRIN XL) 300 mg 24 hr tablet, Take 1 tablet (300 mg total) by mouth daily, Disp: 90 tablet, Rfl: 1  •  clonazePAM (KlonoPIN) 1 mg tablet, Take 1 tablet (1 mg total) by mouth every evening, Disp: 30 tablet, Rfl: 3  •  Fluticasone-Salmeterol (Advair Diskus) 250-50 mcg/dose inhaler, Inhale 1 puff 2 (two) times a day Rinse mouth after use., Disp: 60 blister, Rfl: 0  •  glucose blood (OneTouch Verio) test strip, TEST BLOOD GLUCOSE TWICE A DAY, Disp: 100 strip, Rfl: 5  •  Januvia 100 MG tablet, Take 1 tablet (100 mg total) by mouth daily, Disp: 90 tablet, Rfl: 2  •  Lancets (OneTouch Delica Plus [id number]) MISC, TEST TWICE A DAY, Disp: 100 each, Rfl: 5  •  lisinopril (ZESTRIL) 2.5 mg tablet, Take 1 tablet (2.5 mg total) by mouth daily, Disp: 30 tablet, Rfl: 3  •  metFORMIN (GLUCOPHAGE-XR) 500 mg 24 hr tablet, Take 2 tablets (1,000 mg total) by mouth 2 (two) times a day, Disp: 360 tablet, Rfl: 1  •  Misc.  Devices (Carex Coccyx Cushion) MISC, Use in the morning, Disp: 1 each, Rfl: 0  •  Omega-3 Fatty Acids (FISH OIL PO), , Disp: , Rfl:   •  potassium chloride (Klor-Con) 10 mEq tablet, TAKE TWO TABLETS BY MOUTH EVERY DAY, Disp: 180 tablet, Rfl: 1  •  pregabalin (LYRICA) 200 MG capsule, TAKE ONE CAPSULE BY MOUTH THREE TIMES A DAY, Disp: 90 capsule, Rfl: 2  •  solifenacin (VESICARE) 5 mg tablet, Take 1 tablet (5 mg total) by mouth daily, Disp: 90 tablet, Rfl: 2  •  sucralfate (CARAFATE) 1 g tablet, Take 1 tablet (1 g total) by mouth 4 (four) times a day, Disp: 120 tablet, Rfl: 3     Past History:     Past Medical History:   Diagnosis Date   • Anxiety    • Closed fracture of coccyx ([address]) 2023   • Diabetes mellitus (HCC)    • GERD (gastroesophageal reflux disease)    • Hyperlipidemia    • Hypertension    • IBS (irritable bowel syndrome)    • Shoulder fracture         Past Surgical History:   Procedure Laterality Date   • ANKLE FRACTURE SURGERY Right     has screw in place   •  SECTION      x2   • CHOLECYSTECTOMY     • HERNIA REPAIR      umbilicus w/ mesh        Family History   Problem Relation Age of Onset   • COPD Mother    • Emphysema Mother    • COPD Father    • Emphysema Father         Social History     Socioeconomic History   • Marital status: /Civil Union     Spouse name: Not on file   • Number of children: Not on file   • Years of education: Not on file   • Highest education level: Not on file   Occupational History   • Not on file   Tobacco Use   • Smoking status: Every Day     Packs/day: 1.00     Years: 50.00     Total pack years: 50.00     Types: Cigarettes     Start date: 1961   • Smokeless tobacco: Never   • Tobacco comments:     on & off    Vaping Use   • Vaping Use: Never used   Substance and Sexual Activity   • Alcohol use: Not Currently   • Drug use: Never   • Sexual activity: Not on file   Other Topics Concern   • Not on file   Social History Narrative   • Not on file     Social Determinants of Health     Financial Resource Strain: Low Risk  (2/9/2023)    Overall Financial Resource Strain (CARDIA)    • Difficulty of Paying Living Expenses: Not hard at all   Food Insecurity: No Food Insecurity (8/11/2023)    Hunger Vital Sign    • Worried About Running Out of Food in the Last Year: Never true    • Ran Out of Food in the Last Year: Never true   Transportation Needs: No Transportation Needs (8/11/2023)    PRAPARE - Transportation    • Lack of Transportation (Medical): No    • Lack of Transportation (Non-Medical): No   Physical Activity: Not on file   Stress: Not on file   Social Connections: Not on file   Intimate Partner Violence: Not on file   Housing Stability: Low Risk  (8/11/2023)    Housing Stability Vital Sign    • Unable to Pay for Housing in the Last Year: No    • Number of Places Lived in the Last Year: 1    • Unstable Housing in the Last Year: No        Physical Exam:      /72 (BP Location: Right arm, Patient Position: Sitting, Cuff Size: Large)   Pulse 77   Temp 97.6 °F (36.4 °C) (Tympanic)   Resp 18   Ht 5' 1" (1.549 m)   Wt 71.7 kg (158 lb)   SpO2 96%   BMI 29.85 kg/m²     Physical Exam  Vitals and nursing note reviewed. Constitutional:       General: She is not in acute distress. Appearance: Normal appearance. She is well-developed. HENT:      Head: Normocephalic and atraumatic.       Right Ear: Tympanic membrane and external ear normal.      Left Ear: Tympanic membrane and external ear normal.      Nose: Nose normal.      Mouth/Throat:      Mouth: Mucous membranes are moist.      Pharynx: Oropharynx is clear. No oropharyngeal exudate. Eyes:      General: No scleral icterus. Right eye: No discharge. Left eye: No discharge. Conjunctiva/sclera: Conjunctivae normal.      Pupils: Pupils are equal, round, and reactive to light. Neck:      Thyroid: No thyromegaly. Vascular: No JVD. Cardiovascular:      Rate and Rhythm: Normal rate and regular rhythm. Pulses: Normal pulses. Heart sounds: Normal heart sounds. No murmur heard. Pulmonary:      Effort: Pulmonary effort is normal.      Breath sounds: No wheezing or rales. Chest:      Chest wall: No tenderness. Abdominal:      General: Bowel sounds are normal. There is no distension. Palpations: Abdomen is soft. There is no mass. Tenderness: There is no abdominal tenderness. Musculoskeletal:         General: No tenderness or deformity. Normal range of motion. Cervical back: Normal range of motion. Lymphadenopathy:      Cervical: No cervical adenopathy. Skin:     General: Skin is warm and dry. Capillary Refill: Capillary refill takes less than 2 seconds. Findings: No rash. Neurological:      General: No focal deficit present. Mental Status: She is alert and oriented to person, place, and time. Mental status is at baseline. Cranial Nerves: No cranial nerve deficit. Coordination: Coordination normal.      Deep Tendon Reflexes: Reflexes are normal and symmetric. Reflexes normal.   Psychiatric:         Mood and Affect: Mood normal.         Behavior: Behavior normal.         Thought Content:  Thought content normal.         Judgment: Judgment normal.     I have spent a total time of 45 minutes on 09/11/23 in caring for this patient including Diagnostic results, Prognosis, Risks and benefits of tx options, Instructions for management, Importance of tx compliance, Risk factor reductions, Impressions, Counseling / Coordination of care, Documenting in the medical record, Reviewing / ordering tests, medicine, procedures  , Obtaining or reviewing history   and Communicating with other healthcare professionals . Data:     Pre-operative work-up    Laboratory Results: I have personally reviewed the pertinent laboratory results/reports     EKG: I have personally reviewed pertinent reports. Chest x-ray: I have personally reviewed pertinent reports. Previous cardiopulmonary studies within the past year:  Reviewed       Assessment:     1. Preoperative examination  POCT ECG      2. Bladder neoplasm        3. Left renal mass  POCT ECG      4. Hematuria, unspecified type        5. Type 2 diabetes mellitus with diabetic polyneuropathy, without long-term current use of insulin ([address])        6. Mild intermittent reactive airway disease with acute exacerbation        7. Essential hypertension  POCT ECG      8. Degenerative lumbar disc        9. GERD without esophagitis        10. Urinary incontinence, unspecified type             Plan:     69 y.o. female with planned surgery: Cystoscopy retrograde ureteroscopy possible biopsy possible laser ablation    Cardiac Risk Estimation: per the Revised Cardiac Risk Index (Circ. 100:1043, 1999), the patient's risk factors for cardiac complications include Beatties hypertension mild reactive airway disease intermittent, putting her in: RCI RISK CLASS II (1 risk factor, risk of major cardiac compl. appr. 1.3%). 1. Further preoperative workup as follows:   - None; no further preoperative work-up is required    2. Medication Management/Recommendations:   - None, continue medication regimen including morning of surgery, with sip of water    3. Prophylaxis for cardiac events with perioperative beta-blockers: not indicated.     4. Patient requires further consultation with: None    Clearance  Patient is CLEARED for surgery without any additional cardiac testing.      Haley Mancera DO  [address] [address]  Phone#  [phone]  Fax#  [phone]

## 2023-09-11 NOTE — PRE-PROCEDURE INSTRUCTIONS
Pre-Surgery Instructions:   Medication Instructions   • acetaminophen (TYLENOL) 325 mg tablet Uses PRN- OK to take day of surgery   • albuterol (Ventolin HFA) 90 mcg/act inhaler Uses PRN- OK to take day of surgery   • Apoaequorin (Prevagen) 10 MG CAPS Stop taking 7 days prior to surgery. • Aspirin (ASPIR-81 PO) Stop taking 7 days prior to surgery. - Per pt, instructed by Dr. Juan A Rojas to hold 7 days prior. • atorvastatin (LIPITOR) 10 mg tablet Take night before surgery   • Blood Glucose Monitoring Suppl (ONETOUCH VERIO) w/Device KIT medical supply    • buPROPion (WELLBUTRIN XL) 300 mg 24 hr tablet Hold day of surgery. -- Per pt, instructed by Dr. Juan A Rojas 'no medications DOS. '   • clonazePAM (KlonoPIN) 1 mg tablet Take night before surgery   • Fluticasone-Salmeterol (Advair Diskus) 250-50 mcg/dose inhaler Take day of surgery. • glucose blood (OneTouch Verio) test strip medical supply   • Januvia 100 MG tablet Hold day of surgery. • Lancets (OneTouch Delica Plus [id number]) [facility] medical supply    • lisinopril (ZESTRIL) 2.5 mg tablet Hold day of surgery. • metFORMIN (GLUCOPHAGE-XR) 500 mg 24 hr tablet Hold day of surgery. • Misc. Devices (Carex Coccyx Cushion) MISC medical supply   • Omega-3 Fatty Acids (FISH OIL PO) Stop taking 7 days prior to surgery. • potassium chloride (Klor-Con) 10 mEq tablet Hold day of surgery. • pregabalin (LYRICA) 200 MG capsule Per pt, instructed by Dr. Juan A Rojas 'no medications DOS. '   • solifenacin (VESICARE) 5 mg tablet Hold day of surgery. • sucralfate (CARAFATE) 1 g tablet Take day of surgery. Spoke to pt 9/11. She states she is almost out of lisinopril and inhaler. Instructed her to contact her pharmacy for refills. Medication instructions for day surgery reviewed. Please use only a sip of water to take your instructed medications. Avoid all over the counter vitamins, supplements and NSAIDS for one week prior to surgery per anesthesia guidelines.  Tylenol is ok to take as needed. You will receive a call one business day prior to surgery with an arrival time and hospital directions. If your surgery is scheduled on a Monday, the hospital will be calling you on the Friday prior to your surgery. If you have not heard from anyone by 8pm, please call the hospital supervisor through the hospital  at [phone]. Stu Tejeda [phone]). Do not eat or drink anything after midnight the night before your surgery, including candy, mints, lifesavers, or chewing gum. Do not drink alcohol 24hrs before your surgery. Try not to smoke at least 24hrs before your surgery. Follow the pre surgery showering instructions as listed in the [facility] Surgical Experience Booklet” or otherwise provided by your surgeon's office. Do not shave the surgical area 24 hours before surgery. Do not apply any lotions, creams, including makeup, cologne, deodorant, or perfumes after showering on the day of your surgery. No contact lenses, eye make-up, or artificial eyelashes. Remove nail polish, including gel polish, and any artificial, gel, or acrylic nails if possible. Remove all jewelry including rings and body piercing jewelry. Wear causal clothing that is easy to take on and off. Consider your type of surgery. Keep any valuables, jewelry, piercings at home. Please bring any specially ordered equipment (sling, braces) if indicated. Arrange for a responsible person to drive you to and from the hospital on the day of your surgery. Visitor Guidelines discussed. Call the surgeon's office with any new illnesses, exposures, or additional questions prior to surgery. Please reference your [facility] Surgical Experience Booklet” for additional information to prepare for your upcoming surgery.

## 2023-09-11 NOTE — LETTER
2023     Mahsa Fitzpatrick, [address]    Patient: Jacques Hudson   YOB: 1945   Date of Visit: 2023       Dear Dr. Sarah Arango: Thank you for referring Jacques Husdon to me for evaluation. Below are my notes for this consultation. If you have questions, please do not hesitate to call me. I look forward to following your patient along with you. Sincerely,        Roldan Bledsoe DO        CC: No Recipients    Roldan Bledsoe DO  2023 11:30 AM  Incomplete  FAMILY MEDICINE PRE-OPERATIVE EVALUATION  [facility] - [facility] [id number] [facility] PRACTICE    NAME: Jacques Hudson  AGE: 69 y.o. SEX: female  : 1945     DATE: 2023     Family Medicine Pre-Operative Evaluation:     Chief Complaint: Pre-operative Evaluation     Surgery: Cystoscopy retrograde ureteroscopy possible biopsy possible laser ablation left bladder  Anticipated Date of Surgery: 2023  Referring Provider: Carito Kunz MD        History of Present Illness: Jacques Hudson is a 69 y.o. female who presents to the office today for a preoperative consultation at the request of surgeon, Sarah Arango MD, who plans on performing cystoscopy retrograde ureteroscopy possible biopsy with laser ablation on 2023. planned anesthesia is general. Patient has a bleeding risk of: no recent abnormal bleeding. Patient does not have objections to receiving blood products if needed. Current anti-platelet/anti-coagulation medications that the patient is prescribed includes: Aspirin --the patient will stop as of 5 days prior to surgery as well as omega-3 fish oils     Assessment of Chronic Conditions:   - Diabetes Mellitus:  To hold all medications day of surgery          Review of Systems:     Review of Systems     Problem List:     Patient Active Problem List   Diagnosis   • Type 2 diabetes mellitus with diabetic polyneuropathy ([address])   • Essential hypertension   • Anxiety and depression   • Medicare annual wellness visit, subsequent   • GERD without esophagitis   • Reactive airway disease with acute exacerbation   • Urinary incontinence   • Degenerative lumbar disc   • Hypokalemia   • Hematuria   • Elevated troponin   • Lactic acidosis   • Hypomagnesemia   • Preoperative examination   • Bladder neoplasm   • Left renal mass        Allergies:      Allergies   Allergen Reactions   • Adhesive [Medical Tape] Itching   • Latex    • Paroxetine    • Sulfa Antibiotics    • Sulfamethoxazole-Trimethoprim Hives        Current Medications:       Current Outpatient Medications:   •  acetaminophen (TYLENOL) 325 mg tablet, Take 650 mg by mouth every 6 (six) hours as needed for mild pain, Disp: , Rfl:   •  albuterol (Ventolin HFA) 90 mcg/act inhaler, Inhale 2 puffs every 6 (six) hours as needed for wheezing, Disp: 18 g, Rfl: 0  •  Apoaequorin (Prevagen) 10 MG CAPS, Take by mouth, Disp: , Rfl:   •  Aspirin (ASPIR-81 PO), take one by mouth daily, Disp: , Rfl:   •  atorvastatin (LIPITOR) 10 mg tablet, Take 1 tablet (10 mg total) by mouth daily with dinner, Disp: 30 tablet, Rfl: 3  •  Blood Glucose Monitoring Suppl (ONETOUCH VERIO) w/Device KIT, by Does not apply route 2 (two) times a day, Disp: 1 kit, Rfl: 0  •  buPROPion (WELLBUTRIN XL) 300 mg 24 hr tablet, Take 1 tablet (300 mg total) by mouth daily, Disp: 90 tablet, Rfl: 1  •  clonazePAM (KlonoPIN) 1 mg tablet, Take 1 tablet (1 mg total) by mouth every evening, Disp: 30 tablet, Rfl: 3  •  Fluticasone-Salmeterol (Advair Diskus) 250-50 mcg/dose inhaler, Inhale 1 puff 2 (two) times a day Rinse mouth after use., Disp: 60 blister, Rfl: 0  •  glucose blood (OneTouch Verio) test strip, TEST BLOOD GLUCOSE TWICE A DAY, Disp: 100 strip, Rfl: 5  •  Januvia 100 MG tablet, Take 1 tablet (100 mg total) by mouth daily, Disp: 90 tablet, Rfl: 2  •  Lancets (OneTouch Delica Plus [id number]) MISC, TEST TWICE A DAY, Disp: 100 each, Rfl: 5  •  lisinopril (ZESTRIL) 2.5 mg tablet, Take 1 tablet (2.5 mg total) by mouth daily, Disp: 30 tablet, Rfl: 3  •  metFORMIN (GLUCOPHAGE-XR) 500 mg 24 hr tablet, Take 2 tablets (1,000 mg total) by mouth 2 (two) times a day, Disp: 360 tablet, Rfl: 1  •  Misc.  Devices (Carex Coccyx Cushion) MISC, Use in the morning, Disp: 1 each, Rfl: 0  •  Omega-3 Fatty Acids (FISH OIL PO), , Disp: , Rfl:   •  potassium chloride (Klor-Con) 10 mEq tablet, TAKE TWO TABLETS BY MOUTH EVERY DAY, Disp: 180 tablet, Rfl: 1  •  pregabalin (LYRICA) 200 MG capsule, TAKE ONE CAPSULE BY MOUTH THREE TIMES A DAY, Disp: 90 capsule, Rfl: 2  •  solifenacin (VESICARE) 5 mg tablet, Take 1 tablet (5 mg total) by mouth daily, Disp: 90 tablet, Rfl: 2  •  sucralfate (CARAFATE) 1 g tablet, Take 1 tablet (1 g total) by mouth 4 (four) times a day, Disp: 120 tablet, Rfl: 3     Past History:     Past Medical History:   Diagnosis Date   • Anxiety    • Closed fracture of coccyx ([address]) 2023   • Diabetes mellitus (HCC)    • GERD (gastroesophageal reflux disease)    • Hyperlipidemia    • Hypertension    • IBS (irritable bowel syndrome)    • Shoulder fracture         Past Surgical History:   Procedure Laterality Date   • ANKLE FRACTURE SURGERY Right     has screw in place   •  SECTION      x2   • CHOLECYSTECTOMY     • HERNIA REPAIR      umbilicus w/ mesh        Family History   Problem Relation Age of Onset   • COPD Mother    • Emphysema Mother    • COPD Father    • Emphysema Father         Social History     Socioeconomic History   • Marital status: /Civil Union     Spouse name: Not on file   • Number of children: Not on file   • Years of education: Not on file   • Highest education level: Not on file   Occupational History   • Not on file   Tobacco Use   • Smoking status: Every Day     Packs/day: 1.00     Years: 50.00     Total pack years: 50.00     Types: Cigarettes     Start date:    • Smokeless tobacco: Never   • Tobacco comments:     on & off    Vaping Use   • Vaping Use: Never used   Substance and Sexual Activity   • Alcohol use: Not Currently   • Drug use: Never   • Sexual activity: Not on file   Other Topics Concern   • Not on file   Social History Narrative   • Not on file     Social Determinants of Health     Financial Resource Strain: Low Risk  (2/9/2023)    Overall Financial Resource Strain (CARDIA)    • Difficulty of Paying Living Expenses: Not hard at all   Food Insecurity: No Food Insecurity (8/11/2023)    Hunger Vital Sign    • Worried About Running Out of Food in the Last Year: Never true    • Ran Out of Food in the Last Year: Never true   Transportation Needs: No Transportation Needs (8/11/2023)    PRAPARE - Transportation    • Lack of Transportation (Medical): No    • Lack of Transportation (Non-Medical): No   Physical Activity: Not on file   Stress: Not on file   Social Connections: Not on file   Intimate Partner Violence: Not on file   Housing Stability: Low Risk  (8/11/2023)    Housing Stability Vital Sign    • Unable to Pay for Housing in the Last Year: No    • Number of Places Lived in the Last Year: 1    • Unstable Housing in the Last Year: No        Physical Exam:      /72 (BP Location: Right arm, Patient Position: Sitting, Cuff Size: Large)   Pulse 77   Temp 97.6 °F (36.4 °C) (Tympanic)   Resp 18   Ht 5' 1" (1.549 m)   Wt 71.7 kg (158 lb)   SpO2 96%   BMI 29.85 kg/m²     Physical Exam      Data:     Pre-operative work-up    Laboratory Results: I have personally reviewed the pertinent laboratory results/reports     EKG: I have personally reviewed pertinent reports. Chest x-ray: I have personally reviewed pertinent reports. Previous cardiopulmonary studies within the past year:  Reviewed       Assessment:     1. Preoperative examination  POCT ECG      2. Bladder neoplasm        3. Left renal mass  POCT ECG      4. Hematuria, unspecified type        5.  Type 2 diabetes mellitus with diabetic polyneuropathy, without long-term current use of insulin ([address])        6. Mild intermittent reactive airway disease with acute exacerbation        7. Essential hypertension  POCT ECG      8. Degenerative lumbar disc        9. GERD without esophagitis        10. Urinary incontinence, unspecified type             Plan:     69 y.o. female with planned surgery: Cystoscopy retrograde ureteroscopy possible biopsy possible laser ablation    Cardiac Risk Estimation: per the Revised Cardiac Risk Index (Circ. 100:1043, 1999), the patient's risk factors for cardiac complications include Beatties hypertension mild reactive airway disease intermittent, putting her in: RCI RISK CLASS II (1 risk factor, risk of major cardiac compl. appr. 1.3%). 1. Further preoperative workup as follows:   - None; no further preoperative work-up is required    2. Medication Management/Recommendations:   - None, continue medication regimen including morning of surgery, with sip of water    3. Prophylaxis for cardiac events with perioperative beta-blockers: not indicated. 4. Patient requires further consultation with: None    Clearance  Patient is CLEARED for surgery without any additional cardiac testing.      [facility]  [address] [address]  Phone#  [phone]  Fax#  [phone]

## 2023-09-18 ENCOUNTER — ANESTHESIA EVENT (OUTPATIENT)
Dept: PERIOP | Facility: HOSPITAL | Age: 78
End: 2023-09-18
Payer: COMMERCIAL

## 2023-09-18 ENCOUNTER — HOSPITAL ENCOUNTER (OUTPATIENT)
Facility: HOSPITAL | Age: 78
Setting detail: OUTPATIENT SURGERY
Discharge: HOME | End: 2023-09-18
Attending: UROLOGY | Admitting: UROLOGY
Payer: COMMERCIAL

## 2023-09-18 ENCOUNTER — ANESTHESIA (OUTPATIENT)
Dept: PERIOP | Facility: HOSPITAL | Age: 78
End: 2023-09-18
Payer: COMMERCIAL

## 2023-09-18 ENCOUNTER — APPOINTMENT (OUTPATIENT)
Dept: RADIOLOGY | Facility: HOSPITAL | Age: 78
End: 2023-09-18
Payer: COMMERCIAL

## 2023-09-18 VITALS
RESPIRATION RATE: 20 BRPM | BODY MASS INDEX: 29.83 KG/M2 | TEMPERATURE: 97.4 F | DIASTOLIC BLOOD PRESSURE: 95 MMHG | SYSTOLIC BLOOD PRESSURE: 183 MMHG | HEIGHT: 61 IN | WEIGHT: 158 LBS | OXYGEN SATURATION: 92 % | HEART RATE: 143 BPM

## 2023-09-18 DIAGNOSIS — D41.4 NEOPLASM OF UNCERTAIN BEHAVIOR OF BLADDER: ICD-10-CM

## 2023-09-18 LAB
BILIRUB UR QL STRIP: NEGATIVE
CLARITY UR: CLEAR
COLOR UR: YELLOW
GLUCOSE SERPL-MCNC: 130 MG/DL (ref 65–140)
GLUCOSE SERPL-MCNC: 148 MG/DL (ref 65–140)
GLUCOSE UR STRIP-MCNC: NEGATIVE MG/DL
HGB UR QL STRIP.AUTO: NEGATIVE
KETONES UR STRIP-MCNC: NEGATIVE MG/DL
LEUKOCYTE ESTERASE UR QL STRIP: NEGATIVE
NITRITE UR QL STRIP: NEGATIVE
PH UR STRIP.AUTO: 7.5 [PH]
PROT UR STRIP-MCNC: NEGATIVE MG/DL
SP GR UR STRIP.AUTO: 1.01
UROBILINOGEN UR QL STRIP.AUTO: 0.2 E.U./DL

## 2023-09-18 PROCEDURE — 74420 UROGRAPHY RTRGR +-KUB: CPT

## 2023-09-18 PROCEDURE — 87086 URINE CULTURE/COLONY COUNT: CPT | Performed by: UROLOGY

## 2023-09-18 PROCEDURE — C1769 GUIDE WIRE: HCPCS | Performed by: UROLOGY

## 2023-09-18 PROCEDURE — C1747 URETEROSCOPE DIGITAL FLEX SNGL USE STD DEFLECTION APTRA: HCPCS | Performed by: UROLOGY

## 2023-09-18 PROCEDURE — 88112 CYTOPATH CELL ENHANCE TECH: CPT | Performed by: PATHOLOGY

## 2023-09-18 PROCEDURE — 81003 URINALYSIS AUTO W/O SCOPE: CPT | Performed by: UROLOGY

## 2023-09-18 PROCEDURE — C2617 STENT, NON-COR, TEM W/O DEL: HCPCS | Performed by: UROLOGY

## 2023-09-18 PROCEDURE — 82948 REAGENT STRIP/BLOOD GLUCOSE: CPT

## 2023-09-18 PROCEDURE — 88305 TISSUE EXAM BY PATHOLOGIST: CPT | Performed by: PATHOLOGY

## 2023-09-18 DEVICE — INLAY OPTIMA URETERAL STENT W/O GUIDEWIRE
Type: IMPLANTABLE DEVICE | Site: URETER | Status: FUNCTIONAL
Brand: BARD® INLAY OPTIMA® URETERAL STENT

## 2023-09-18 RX ORDER — CEFTRIAXONE 1 G/50ML
1000 INJECTION, SOLUTION INTRAVENOUS EVERY 24 HOURS
Status: DISCONTINUED | OUTPATIENT
Start: 2023-09-18 | End: 2023-09-18 | Stop reason: HOSPADM

## 2023-09-18 RX ORDER — MAGNESIUM HYDROXIDE 1200 MG/15ML
LIQUID ORAL AS NEEDED
Status: DISCONTINUED | OUTPATIENT
Start: 2023-09-18 | End: 2023-09-18 | Stop reason: HOSPADM

## 2023-09-18 RX ORDER — SODIUM CHLORIDE, SODIUM LACTATE, POTASSIUM CHLORIDE, CALCIUM CHLORIDE 600; 310; 30; 20 MG/100ML; MG/100ML; MG/100ML; MG/100ML
INJECTION, SOLUTION INTRAVENOUS CONTINUOUS PRN
Status: DISCONTINUED | OUTPATIENT
Start: 2023-09-18 | End: 2023-09-18

## 2023-09-18 RX ORDER — FENTANYL CITRATE/PF 50 MCG/ML
25 SYRINGE (ML) INJECTION
Status: DISCONTINUED | OUTPATIENT
Start: 2023-09-18 | End: 2023-09-18 | Stop reason: HOSPADM

## 2023-09-18 RX ORDER — ONDANSETRON 2 MG/ML
INJECTION INTRAMUSCULAR; INTRAVENOUS AS NEEDED
Status: DISCONTINUED | OUTPATIENT
Start: 2023-09-18 | End: 2023-09-18

## 2023-09-18 RX ORDER — DEXAMETHASONE SODIUM PHOSPHATE 10 MG/ML
INJECTION, SOLUTION INTRAMUSCULAR; INTRAVENOUS AS NEEDED
Status: DISCONTINUED | OUTPATIENT
Start: 2023-09-18 | End: 2023-09-18

## 2023-09-18 RX ORDER — GLYCOPYRROLATE 0.2 MG/ML
INJECTION INTRAMUSCULAR; INTRAVENOUS AS NEEDED
Status: DISCONTINUED | OUTPATIENT
Start: 2023-09-18 | End: 2023-09-18

## 2023-09-18 RX ORDER — FENTANYL CITRATE 50 UG/ML
INJECTION, SOLUTION INTRAMUSCULAR; INTRAVENOUS AS NEEDED
Status: DISCONTINUED | OUTPATIENT
Start: 2023-09-18 | End: 2023-09-18

## 2023-09-18 RX ORDER — PROPOFOL 10 MG/ML
INJECTION, EMULSION INTRAVENOUS AS NEEDED
Status: DISCONTINUED | OUTPATIENT
Start: 2023-09-18 | End: 2023-09-18

## 2023-09-18 RX ORDER — SODIUM CHLORIDE, SODIUM LACTATE, POTASSIUM CHLORIDE, CALCIUM CHLORIDE 600; 310; 30; 20 MG/100ML; MG/100ML; MG/100ML; MG/100ML
125 INJECTION, SOLUTION INTRAVENOUS CONTINUOUS
Status: DISCONTINUED | OUTPATIENT
Start: 2023-09-18 | End: 2023-09-18 | Stop reason: HOSPADM

## 2023-09-18 RX ORDER — SODIUM CHLORIDE, SODIUM LACTATE, POTASSIUM CHLORIDE, CALCIUM CHLORIDE 600; 310; 30; 20 MG/100ML; MG/100ML; MG/100ML; MG/100ML
20 INJECTION, SOLUTION INTRAVENOUS CONTINUOUS
Status: DISCONTINUED | OUTPATIENT
Start: 2023-09-18 | End: 2023-09-18 | Stop reason: HOSPADM

## 2023-09-18 RX ADMIN — CEFTRIAXONE 1000 MG: 1 INJECTION, SOLUTION INTRAVENOUS at 15:07

## 2023-09-18 RX ADMIN — SODIUM CHLORIDE, SODIUM LACTATE, POTASSIUM CHLORIDE, AND CALCIUM CHLORIDE 125 ML/HR: .6; .31; .03; .02 INJECTION, SOLUTION INTRAVENOUS at 12:51

## 2023-09-18 RX ADMIN — SODIUM CHLORIDE, SODIUM LACTATE, POTASSIUM CHLORIDE, AND CALCIUM CHLORIDE: .6; .31; .03; .02 INJECTION, SOLUTION INTRAVENOUS at 16:56

## 2023-09-18 RX ADMIN — Medication 8 MCG: at 15:09

## 2023-09-18 RX ADMIN — DEXAMETHASONE SODIUM PHOSPHATE 10 MG: 10 INJECTION, SOLUTION INTRAMUSCULAR; INTRAVENOUS at 15:15

## 2023-09-18 RX ADMIN — SODIUM CHLORIDE, SODIUM LACTATE, POTASSIUM CHLORIDE, AND CALCIUM CHLORIDE: .6; .31; .03; .02 INJECTION, SOLUTION INTRAVENOUS at 15:07

## 2023-09-18 RX ADMIN — GLYCOPYRROLATE 0.2 MG: 0.2 INJECTION INTRAMUSCULAR; INTRAVENOUS at 15:15

## 2023-09-18 RX ADMIN — FENTANYL CITRATE 50 MCG: 50 INJECTION, SOLUTION INTRAMUSCULAR; INTRAVENOUS at 15:15

## 2023-09-18 RX ADMIN — PROPOFOL 100 MG: 10 INJECTION, EMULSION INTRAVENOUS at 15:15

## 2023-09-18 RX ADMIN — FENTANYL CITRATE 50 MCG: 50 INJECTION, SOLUTION INTRAMUSCULAR; INTRAVENOUS at 16:16

## 2023-09-18 RX ADMIN — ONDANSETRON 4 MG: 2 INJECTION INTRAMUSCULAR; INTRAVENOUS at 16:46

## 2023-09-18 RX ADMIN — PROPOFOL 100 MG: 10 INJECTION, EMULSION INTRAVENOUS at 15:16

## 2023-09-18 NOTE — OP NOTE
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    Procedure(s):  Left - CYSTOSCOPY; RETROGRADE; URETEROSCOPY; BIOPSY; LEFT -INSERTION OF STENT    Specimen(s):  ID Type Source Tests Collected by Time Destination   1 :  Urine Urine, Other CYTOLOGY, URINE, URINE CULTURE, URINALYSIS WITH REFLEX TO SCOPE Adelaide Bosworth, MD 2023 1528    2 : LEFT KIDNEY Urine Urine, Other CYTOLOGY, URINE Adelaide Bosworth, MD 2023 1537    3 : LEFT KIDNEY WASHINGS Urine Urine, Other CYTOLOGY, URINE Adelaide Bosworth, MD 2023 1542    4 : LEFT UPPER POLE RENAL MASS Tissue Soft Tissue, Other TISSUE EXAM Adelaide Bosworth, MD 2023 1617        Estimated Blood Loss:   Minimal    Drains:  Ureteral Internal Stent Left ureter 6 Fr. (Active)   Number of days: 0       Anesthesia Type:   General    Operative Indications:  Neoplasm of uncertain behavior of bladder [D41.4]  This is a 26-year-old female with persistent intermittent gross hematuria with CT urogram showing 1.4 cm filling defect in the upper pole collecting system located within the proximal upper pole infundibulum. Operative Findings:  Papillary urothelial tumor located within the left upper pole collecting system corresponding to the CT scan    Complications:   None    Procedure and Technique:  The patient was properly identified in the operating room and after adequate general anesthesia was placed in the lithotomy position. The lower abdomen and genitalia were prepped and draped in the usual fashion. 21 Croatian cystoscope was inserted into the bladder and urine was drained and sent for urinalysis, culture and cytology. Cystoscopy was performed with 30 degree and 70 degree lenses. The bladder was smooth with no masses or calcifications. There was a mild cystocele. The ureteral orifices were normal bilaterally. In order to collect cytology, a 5 [place] open-ended catheter was passed into the left ureteral orifice up to the level of the kidney. Urine was drained and sent for cytology. Urine was a light rust color. Washing was then obtained with normal saline and sent for cytology. Dilute contrast was then instilled showing that the tip of the catheter was within the renal pelvis. A subtle but persistent filling defect was seen within the upper pole infundibulum below the upper pole calyx as previously seen on CT urogram.  The remainder of the collecting system was normal without additional filling defect. A Solo guidewire was passed through the catheter into the upper pole and externalized and secured as a safety wire. A second guidewire was then passed cystoscopically into the left ureter into the upper pole. Attempt was made to pass a single use ureteroscope over this wire, but it would not pass through the ureteral orifice. A smaller permanent ureteroscope was then passed over this wire and did pass up the ureter into the collecting system without difficulty. The wire was removed. The ureteroscope was passed into the upper pole infundibulum and a papillary urothelial tumor was seen circumferentially taking up most of the lumen. There was some mild bleeding decreasing visibility. A [place] biopsy forcep was utilized to biopsy the lesion several times and sent as specimen. Good specimen appeared to be obtained within the biopsy container. Dilute contrast was then instilled showing no extravasation. The ureteroscope was then removed under direct vision throughout the renal pelvis and entire ureter showing no additional abnormalities. The wire was then backloaded onto the cystoscope.   A 5 [place] open-ended catheter was passed over the wire into the collecting system. The ureteral length was measured. The catheter was removed leaving the wire in place. A 6 [place]olan 24 cm Bard inlay [place] stent was passed over the wire and the wire was removed leaving a good curl of stent within the renal pelvis and within the bladder. The bladder was emptied and the cystoscope was removed. The patient tolerated the procedure well and left the operating room in good condition. I was present for the entire procedure.     Patient Disposition:  PACU         SIGNATURE: Chong Venegas MD  DATE: September 18, 2023  TIME: 5:04 PM

## 2023-09-18 NOTE — ANESTHESIA POSTPROCEDURE EVALUATION
Post-Op Assessment Note    CV Status:  Stable  Pain Score: 0    Pain management: adequate     Mental Status:  Alert and awake   Hydration Status:  Euvolemic   PONV Controlled:  Controlled   Airway Patency:  Patent      Post Op Vitals Reviewed: Yes      Staff: CRNA         No notable events documented.     BP  150/76   Temp 97   Pulse 78   Resp 14   SpO2 99

## 2023-09-18 NOTE — ANESTHESIA PREPROCEDURE EVALUATION
Procedure:  CYSTOSCOPY; RETROGRADE; URETEROSCOPY; POSSIBLE BIOPSY; POSSIBLE LASER ABLATION (Left: Bladder)    Relevant Problems   CARDIO   (+) Essential hypertension      ENDO   (+) Type 2 diabetes mellitus with diabetic polyneuropathy (HCC)      GI/HEPATIC   (+) GERD without esophagitis      MUSCULOSKELETAL   (+) Degenerative lumbar disc      NEURO/PSYCH   (+) Anxiety and depression     Asthma  Echo 8/2023  •  Left Ventricle: Left ventricular cavity size is normal. Wall thickness is mild-moderately increased. The left ventricular ejection fraction is 50-55%. Systolic function is low normal. Although no diagnostic regional wall motion abnormality was identified, this possibility cannot be completely excluded on the basis of this study. Diastolic function is mildly abnormal, consistent with grade I (abnormal) relaxation. •  Left Atrium: The atrium is mildly dilated. •  Mitral Valve: There is mild annular calcification. There is mild regurgitation. •  Tricuspid Valve: There is mild to moderate regurgitation. •  Pulmonic Valve: There is mild regurgitation. Physical Exam    Airway    Mallampati score: II  TM Distance: >3 FB  Neck ROM: full     Dental       Cardiovascular  Rhythm: regular, Rate: normal,     Pulmonary  Breath sounds clear to auscultation,     Other Findings        Anesthesia Plan  ASA Score- 2     Anesthesia Type- general with ASA Monitors. Additional Monitors:   Airway Plan: LMA. Comment: Risks/benefits and alternatives discussed with patient including possible PONV, sore throat, damage to teeth/lips/gums/esophagus, and possibility of rare anesthetic and surgical emergencies including but not limited to heart attack, stroke, and/or death. All questions were answered. \. Plan Factors-    Chart reviewed. Existing labs reviewed. Patient summary reviewed. Patient is not a current smoker. Obstructive sleep apnea risk education given perioperatively.     Induction- intravenous. Postoperative Plan-     Informed Consent- Anesthetic plan and risks discussed with patient. I personally reviewed this patient with the CRNA. Discussed and agreed on the Anesthesia Plan with the CRNA. Ida Mason

## 2023-09-18 NOTE — INTERVAL H&P NOTE
H&P reviewed. After examining the patient I find no changes in the patients condition since the H&P had been written.     Vitals:    09/18/23 1238   BP: (!) 184/76   Pulse: 84   Resp: 20   Temp: (!) 97.1 °F (36.2 °C)   SpO2: 98%

## 2023-09-18 NOTE — DISCHARGE INSTR - AVS FIRST PAGE
Take Keflex 500 mg 3 times a day starting tomorrow  Dr. Tahmina Stout office will call for follow-up appointment

## 2023-09-19 LAB — BACTERIA UR CULT: NORMAL

## 2023-09-20 DIAGNOSIS — I10 ESSENTIAL HYPERTENSION: ICD-10-CM

## 2023-09-20 DIAGNOSIS — J44.9 COPD (CHRONIC OBSTRUCTIVE PULMONARY DISEASE) (HCC): ICD-10-CM

## 2023-09-20 DIAGNOSIS — R09.02 HYPOXIA: ICD-10-CM

## 2023-09-20 RX ORDER — FLUTICASONE PROPIONATE AND SALMETEROL 250; 50 UG/1; UG/1
1 POWDER RESPIRATORY (INHALATION) 2 TIMES DAILY
Qty: 60 BLISTER | Refills: 0 | Status: SHIPPED | OUTPATIENT
Start: 2023-09-20 | End: 2023-10-20

## 2023-09-20 RX ORDER — LISINOPRIL 2.5 MG/1
2.5 TABLET ORAL DAILY
Qty: 30 TABLET | Refills: 3 | Status: SHIPPED | OUTPATIENT
Start: 2023-09-20 | End: 2023-09-27 | Stop reason: SDUPTHER

## 2023-09-20 RX ORDER — HYDROCHLOROTHIAZIDE 50 MG/1
50 TABLET ORAL DAILY
Qty: 30 TABLET | Refills: 3 | Status: SHIPPED | OUTPATIENT
Start: 2023-09-20

## 2023-09-20 RX ORDER — ATORVASTATIN CALCIUM 10 MG/1
10 TABLET, FILM COATED ORAL
Qty: 30 TABLET | Refills: 3 | Status: SHIPPED | OUTPATIENT
Start: 2023-09-20 | End: 2023-09-27 | Stop reason: SDUPTHER

## 2023-09-20 RX ORDER — ALBUTEROL SULFATE 90 UG/1
2 AEROSOL, METERED RESPIRATORY (INHALATION) EVERY 6 HOURS PRN
Qty: 18 G | Refills: 0 | Status: SHIPPED | OUTPATIENT
Start: 2023-09-20

## 2023-09-21 PROCEDURE — 88112 CYTOPATH CELL ENHANCE TECH: CPT | Performed by: PATHOLOGY

## 2023-09-21 PROCEDURE — 88305 TISSUE EXAM BY PATHOLOGIST: CPT | Performed by: PATHOLOGY

## 2023-09-27 DIAGNOSIS — R09.02 HYPOXIA: ICD-10-CM

## 2023-09-27 DIAGNOSIS — I10 ESSENTIAL HYPERTENSION: ICD-10-CM

## 2023-09-27 RX ORDER — LISINOPRIL 2.5 MG/1
2.5 TABLET ORAL DAILY
Qty: 90 TABLET | Refills: 1 | Status: SHIPPED | OUTPATIENT
Start: 2023-09-27

## 2023-09-27 RX ORDER — ATORVASTATIN CALCIUM 10 MG/1
10 TABLET, FILM COATED ORAL
Qty: 90 TABLET | Refills: 1 | Status: SHIPPED | OUTPATIENT
Start: 2023-09-27

## 2023-10-10 ENCOUNTER — OFFICE VISIT (OUTPATIENT)
Dept: CARDIOLOGY CLINIC | Facility: CLINIC | Age: 78
End: 2023-10-10
Payer: COMMERCIAL

## 2023-10-10 VITALS
WEIGHT: 158 LBS | SYSTOLIC BLOOD PRESSURE: 158 MMHG | BODY MASS INDEX: 29.83 KG/M2 | DIASTOLIC BLOOD PRESSURE: 70 MMHG | HEART RATE: 84 BPM | HEIGHT: 61 IN

## 2023-10-10 DIAGNOSIS — R09.02 HYPOXIA: ICD-10-CM

## 2023-10-10 DIAGNOSIS — I10 ESSENTIAL HYPERTENSION: Primary | ICD-10-CM

## 2023-10-10 DIAGNOSIS — I73.9 PVD (PERIPHERAL VASCULAR DISEASE) (HCC): ICD-10-CM

## 2023-10-10 DIAGNOSIS — E87.6 HYPOKALEMIA: ICD-10-CM

## 2023-10-10 PROCEDURE — 99214 OFFICE O/P EST MOD 30 MIN: CPT | Performed by: INTERNAL MEDICINE

## 2023-10-10 RX ORDER — LISINOPRIL 5 MG/1
5 TABLET ORAL DAILY
Qty: 90 TABLET | Refills: 5 | Status: SHIPPED | OUTPATIENT
Start: 2023-10-10

## 2023-10-10 RX ORDER — AMILORIDE HYDROCHLORIDE 5 MG/1
5 TABLET ORAL DAILY
Qty: 90 TABLET | Refills: 5 | Status: SHIPPED | OUTPATIENT
Start: 2023-10-10

## 2023-10-10 NOTE — PROGRESS NOTES
Patient ID: Esther Meadows is a 69 y.o. female. Plan:      PVD (peripheral vascular disease) ([address])  No palpable leg pulses but feet are warm. Will reassess by doppler. Essential hypertension  Will increase the Lisinopril dose. Hypokalemia  Will change the diuretic to potassium sparing. Follow up Plan/Other summary comments:  She will f/u in general with Dr. Thong Allen. Here as needed. If K too high on amiloride, this can be stopped. HPI: Patient is seen in follow-up today. Recent hospital stay was reviewed. She had presented with confusion. There were lots of PVCs originally. Potassium level was 2.6 and troponin was mildly elevated. Follow-up with cardiology was recommended. My review is that the troponin elevation is not heart related. Fortunately echocardiography was normal.  Since that hospital stay she had cystoscopy and there is no mention of PVCs. No chest pain or chest pressure. There is mild chronic dyspnea. She continues to be a 1 pack/day cigarette smoker down from 2 packs/day. Leg pulses are significantly diminished and she has some numbness with standing but no clear-cut claudication. Most recent or relevant cardiac/vascular testing:    TTE 2023: Normal LV systolic function.       Past Surgical History:   Procedure Laterality Date   • ANKLE FRACTURE SURGERY Right     has screw in place   •  SECTION      x2   • CHOLECYSTECTOMY     • CYSTOSCOPY W/ LASER LITHOTRIPSY Left 2023    Procedure: CYSTOSCOPY; RETROGRADE; URETEROSCOPY; BIOPSY; LEFT -INSERTION OF STENT;  Surgeon: Lonzo Closs, MD;  Location: [facility];  Service: Urology   • FL RETROGRADE PYELOGRAM  2023   • HERNIA REPAIR      umbilicus w/ mesh       Lipid Profile:   Lab Results   Component Value Date    CHOL 206 (H) 2018    TRIG 248 (H) 2023    TRIG 210 (H) 2018    HDL 40 (L) 2023    HDL 48 2018         Review of Systems   10  point ROS  was otherwise non pertinent or negative except as per HPI or as below. Gait: Normal.        Objective:     /70   Pulse 84   Ht 5' 1" (1.549 m)   Wt 71.7 kg (158 lb)   BMI 29.85 kg/m²     PHYSICAL EXAM:    General:  Normal appearance in no distress. Eyes:  Anicteric. Oral mucosa:  Moist.  Neck:  No JVD. Carotid upstrokes are brisk without bruits. No masses. Chest:  Clear to auscultation. Cardiac:  No palpable PMI. Normal S1 and S2. No murmur gallop or rub. Abdomen:  Soft and nontender. No palpable organomegaly or aortic enlargement. Extremities:  No peripheral edema. Musculoskeletal:  Symmetric. Vascular:  Femoral popliteal and pedal pulses are absent. The feet are warm however. Neuro:  Grossly symmetric. Psych:  Alert and oriented x3.         Current Outpatient Medications:   •  acetaminophen (TYLENOL) 325 mg tablet, Take 650 mg by mouth every 6 (six) hours as needed for mild pain, Disp: , Rfl:   •  albuterol (Ventolin HFA) 90 mcg/act inhaler, Inhale 2 puffs every 6 (six) hours as needed for wheezing, Disp: 18 g, Rfl: 0  •  AMILoride 5 mg tablet, Take 1 tablet (5 mg total) by mouth daily, Disp: 90 tablet, Rfl: 5  •  Apoaequorin (Prevagen) 10 MG CAPS, Take by mouth, Disp: , Rfl:   •  Aspirin (ASPIR-81 PO), take one by mouth daily, Disp: , Rfl:   •  atorvastatin (LIPITOR) 10 mg tablet, Take 1 tablet (10 mg total) by mouth daily with dinner, Disp: 90 tablet, Rfl: 1  •  Blood Glucose Monitoring Suppl (ONETOUCH VERIO) w/Device KIT, by Does not apply route 2 (two) times a day, Disp: 1 kit, Rfl: 0  •  buPROPion (WELLBUTRIN XL) 300 mg 24 hr tablet, Take 1 tablet (300 mg total) by mouth daily, Disp: 90 tablet, Rfl: 1  •  clonazePAM (KlonoPIN) 1 mg tablet, Take 1 tablet (1 mg total) by mouth every evening, Disp: 30 tablet, Rfl: 3  •  Fluticasone-Salmeterol (Advair Diskus) 250-50 mcg/dose inhaler, Inhale 1 puff 2 (two) times a day Rinse mouth after use., Disp: 60 blister, Rfl: 0  •  glucose blood (OneTouch Verio) test strip, TEST BLOOD GLUCOSE TWICE A DAY, Disp: 100 strip, Rfl: 5  •  Januvia 100 MG tablet, Take 1 tablet (100 mg total) by mouth daily, Disp: 90 tablet, Rfl: 2  •  Lancets (OneTouch Delica Plus [id number]) MISC, TEST TWICE A DAY, Disp: 100 each, Rfl: 5  •  lisinopril (ZESTRIL) 5 mg tablet, Take 1 tablet (5 mg total) by mouth daily, Disp: 90 tablet, Rfl: 5  •  metFORMIN (GLUCOPHAGE-XR) 500 mg 24 hr tablet, Take 2 tablets (1,000 mg total) by mouth 2 (two) times a day, Disp: 360 tablet, Rfl: 1  •  Misc.  Devices (Carex Coccyx Cushion) MISC, Use in the morning, Disp: 1 each, Rfl: 0  •  Omega-3 Fatty Acids (FISH OIL PO), , Disp: , Rfl:   •  pregabalin (LYRICA) 200 MG capsule, TAKE ONE CAPSULE BY MOUTH THREE TIMES A DAY, Disp: 90 capsule, Rfl: 2  •  solifenacin (VESICARE) 5 mg tablet, Take 1 tablet (5 mg total) by mouth daily, Disp: 90 tablet, Rfl: 2  •  sucralfate (CARAFATE) 1 g tablet, Take 1 tablet (1 g total) by mouth 4 (four) times a day (Patient not taking: Reported on 10/10/2023), Disp: 120 tablet, Rfl: 3  Allergies   Allergen Reactions   • Adhesive [Medical Tape] Itching   • Latex    • Paroxetine    • Sulfa Antibiotics    • Sulfamethoxazole-Trimethoprim Hives     Past Medical History:   Diagnosis Date   • Anxiety    • Closed fracture of coccyx ([address]) 05/24/2023   • Diabetes mellitus ([address])    • GERD (gastroesophageal reflux disease)    • Hyperlipidemia    • Hypertension    • IBS (irritable bowel syndrome)    • Shoulder fracture            Social History     Tobacco Use   Smoking Status Every Day   • Packs/day: 1.00   • Years: 50.00   • Total pack years: 50.00   • Types: Cigarettes   • Start date: 66   Smokeless Tobacco Never   Tobacco Comments    on & off

## 2023-10-17 ENCOUNTER — OFFICE VISIT (OUTPATIENT)
Dept: PULMONOLOGY | Facility: CLINIC | Age: 78
End: 2023-10-17
Payer: COMMERCIAL

## 2023-10-17 VITALS
TEMPERATURE: 98.2 F | DIASTOLIC BLOOD PRESSURE: 68 MMHG | HEART RATE: 64 BPM | BODY MASS INDEX: 30.43 KG/M2 | OXYGEN SATURATION: 96 % | WEIGHT: 161.2 LBS | HEIGHT: 61 IN | SYSTOLIC BLOOD PRESSURE: 142 MMHG

## 2023-10-17 DIAGNOSIS — R93.89 ABNORMAL CT OF THE CHEST: ICD-10-CM

## 2023-10-17 DIAGNOSIS — J44.9 COPD (CHRONIC OBSTRUCTIVE PULMONARY DISEASE) (HCC): ICD-10-CM

## 2023-10-17 DIAGNOSIS — J44.9 COPD, SEVERITY TO BE DETERMINED (HCC): Primary | ICD-10-CM

## 2023-10-17 DIAGNOSIS — F17.200 TOBACCO USE DISORDER: ICD-10-CM

## 2023-10-17 PROCEDURE — 99214 OFFICE O/P EST MOD 30 MIN: CPT

## 2023-10-17 NOTE — ASSESSMENT & PLAN NOTE
S/p hospitalization for acute exacerbation of presumed COPD. She has had no formal PFTs/diagnosis. We will have patient complete PFTs and follow-up at her next office visit. For now, patient will continue using Symbicort 2 puffs twice daily and albuterol as needed. At her next office visit, may benefit from addition of LAMA therapy. She is UTD on vaccinations. Encouraged smoking cessation.

## 2023-10-17 NOTE — PROGRESS NOTES
Pulmonary Follow Up Note  Rell Bruce 69 y.o. female MRN: [id number]  10/17/2023    Assessment:    Tobacco use disorder  Patient with at least 50-pack-year smoking history, continues to smoke 1 pack/day. We discussed adverse health risks associated with continued tobacco/cigarette smoking including progressive lung disease, risk of MI, risk of PVD, delayed wound healing, cancer, etc. She had success previously with Chantix, but is not interested in trying to quit at this time. We will discuss again at her next office visit. Abnormal CT of the chest  CT chest from 8/11 with extensive perilymphatic reticulonodular densities greater in the upper lung zones, likely infectious or inflammatory. I have ordered a repeat CT chest for 2-month follow-up. We will follow-up with patient with these results. COPD, severity to be determined [facility])  S/p hospitalization for acute exacerbation of presumed COPD. She has had no formal PFTs/diagnosis. We will have patient complete PFTs and follow-up at her next office visit. For now, patient will continue using Symbicort 2 puffs twice daily and albuterol as needed. At her next office visit, may benefit from addition of LAMA therapy. She is UTD on vaccinations. Encouraged smoking cessation. Plan:    Diagnoses and all orders for this visit:    Abnormal CT of the chest  -     CT chest wo contrast; Future    COPD, severity to be determined ([address])  -     Complete PFT with post Bronchodilator and Six Minute walk; Future    COPD (chronic obstructive pulmonary disease) ([address])  -     Ambulatory referral to Pulmonology    Tobacco use disorder        Return in about 2 months (around 12/17/2023).       History of Present Illness     Chief Complaint:   Chief Complaint   Patient presents with    Follow-up       Patient ID: Cong Fredeman is a 69 y.o. y.o. female has a past medical history of Anxiety, Closed fracture of coccyx ([address]) (05/24/2023), Diabetes mellitus ([address]), GERD (gastroesophageal reflux disease), Hyperlipidemia, Hypertension, IBS (irritable bowel syndrome), and Shoulder fracture. 10/17/2023  HPI: Della Zamarripa is a 69 y.o. female who presents to the office today for a hospital follow-up visit. She has a past medical history including but not limited to GERD, prediabetes, and hypertension. She is a current smoker with at least 50-pack-year history. She initially presented to the ED on 8/11 due to hematuria. During hospitalization, was also noted to have acute exacerbation of COPD. Initially requiring oxygen and then titrated off. She was also treated with IV steroids, antibiotics, and nebulizer treatments. CT chest showed multiple perilymphatic reticulonodular densities greater in the upper lung zones. Likely infectious/inflammatory versus metastatic, but no prior CT imaging to compare to. She was sent home with albuterol and Symbicort on discharge. Also discharged with prednisone taper and cefdinir. Patient states she feels great since hospital discharge. She denies any shortness of breath, wheezing, chest tightness/chest pain. Has occasional cough is worse in the morning with productive tan mucus. Patient has been using her Symbicort inhaler twice daily as prescribed and feels like it is working well for her. Rarely requires use of her albuterol inhaler. She continues to smoke 1 pack/day. She used Chantix previously and was able to quit for 9 months and then relapsed due to stress. She is not interested in quitting at this time. 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 Review of Systems   Constitutional:  Negative for activity change, chills, diaphoresis, fever and unexpected weight change. HENT:  Negative for congestion, postnasal drip, rhinorrhea, sore throat, trouble swallowing and voice change. Respiratory:  Positive for cough.  Negative for chest tightness, shortness of breath and wheezing. Cardiovascular:  Negative for chest pain, palpitations and leg swelling. Allergic/Immunologic: Negative. Historical Information   Past Medical History:   Diagnosis Date    Anxiety     Closed fracture of coccyx ([address]) 2023    Diabetes mellitus (HCC)     GERD (gastroesophageal reflux disease)     Hyperlipidemia     Hypertension     IBS (irritable bowel syndrome)     Shoulder fracture      Past Surgical History:   Procedure Laterality Date    ANKLE FRACTURE SURGERY Right     has screw in place     SECTION      x2    CHOLECYSTECTOMY      CYSTOSCOPY W/ LASER LITHOTRIPSY Left 2023    Procedure: CYSTOSCOPY; RETROGRADE; URETEROSCOPY; BIOPSY; LEFT -INSERTION OF STENT;  Surgeon: Inessa Clark MD;  Location: [facility];  Service: Urology    FL RETROGRADE PYELOGRAM  2023    HERNIA REPAIR      umbilicus w/ mesh     Family History   Problem Relation Age of Onset    COPD Mother     Emphysema Mother     COPD Father     Emphysema Father        Smoking history: She reports that she has been smoking cigarettes. She started smoking about 62 years ago. She has a 50.00 pack-year smoking history.  She has never used smokeless tobacco.    Occupational History:     Immunization History   Administered Date(s) Administered    COVID-19 MODERNA VACC 0.5 ML IM 2021, 2021, 2021    COVID-19 Moderna Vac BIVALENT 12 Yr+ IM 0.5 ML 2022    Hep B / HiB 2013, 2013, 10/03/2013    INFLUENZA 2014, 10/18/2022    Influenza, high dose seasonal 0.7 mL 2019, 10/02/2020, 10/27/2021, 10/18/2022    Pneumococcal Conjugate 13-Valent 2020    Tdap 2017       Meds/Allergies     Current Outpatient Medications:     acetaminophen (TYLENOL) 325 mg tablet, Take 650 mg by mouth every 6 (six) hours as needed for mild pain, Disp: , Rfl:     albuterol (Ventolin HFA) 90 mcg/act inhaler, Inhale 2 puffs every 6 (six) hours as needed for wheezing, Disp: 18 g, Rfl: 0    AMILoride 5 mg tablet, Take 1 tablet (5 mg total) by mouth daily, Disp: 90 tablet, Rfl: 5    Apoaequorin (Prevagen) 10 MG CAPS, Take by mouth, Disp: , Rfl:     Aspirin (ASPIR-81 PO), take one by mouth daily, Disp: , Rfl:     atorvastatin (LIPITOR) 10 mg tablet, Take 1 tablet (10 mg total) by mouth daily with dinner, Disp: 90 tablet, Rfl: 1    Blood Glucose Monitoring Suppl ([facility] [place]) w/Device KIT, by Does not apply route 2 (two) times a day, Disp: 1 kit, Rfl: 0    buPROPion (WELLBUTRIN XL) 300 mg 24 hr tablet, Take 1 tablet (300 mg total) by mouth daily, Disp: 90 tablet, Rfl: 1    clonazePAM (KlonoPIN) 1 mg tablet, Take 1 tablet (1 mg total) by mouth every evening, Disp: 30 tablet, Rfl: 3    Fluticasone-Salmeterol (Advair Diskus) 250-50 mcg/dose inhaler, Inhale 1 puff 2 (two) times a day Rinse mouth after use., Disp: 60 blister, Rfl: 0    glucose blood (OneTouch Verio) test strip, TEST BLOOD GLUCOSE TWICE A DAY, Disp: 100 strip, Rfl: 5    Januvia 100 MG tablet, Take 1 tablet (100 mg total) by mouth daily, Disp: 90 tablet, Rfl: 2    Lancets (OneTouch Delica Plus [id number]) MISC, TEST TWICE A DAY, Disp: 100 each, Rfl: 5    lisinopril (ZESTRIL) 5 mg tablet, Take 1 tablet (5 mg total) by mouth daily, Disp: 90 tablet, Rfl: 5    metFORMIN (GLUCOPHAGE-XR) 500 mg 24 hr tablet, Take 2 tablets (1,000 mg total) by mouth 2 (two) times a day, Disp: 360 tablet, Rfl: 1    Misc. Devices (Carex Coccyx Cushion) MISC, Use in the morning, Disp: 1 each, Rfl: 0    Omega-3 Fatty Acids (FISH OIL PO), , Disp: , Rfl:     pregabalin (LYRICA) 200 MG capsule, TAKE ONE CAPSULE BY MOUTH THREE TIMES A DAY, Disp: 90 capsule, Rfl: 2    solifenacin (VESICARE) 5 mg tablet, Take 1 tablet (5 mg total) by mouth daily, Disp: 90 tablet, Rfl: 2    sucralfate (CARAFATE) 1 g tablet, Take 1 tablet (1 g total) by mouth 4 (four) times a day (Patient not taking: Reported on 10/17/2023), Disp: 120 tablet, Rfl: 3  Allergies:    Allergies   Allergen Reactions    Adhesive [Medical Tape] Itching    Latex     Paroxetine     Sulfa Antibiotics     Sulfamethoxazole-Trimethoprim Hives         Vitals:  Vitals:    10/17/23 1010   BP: 142/68   BP Location: Left arm   Patient Position: Sitting   Cuff Size: Adult   Pulse: 64   Temp: 98.2 °F (36.8 °C)   TempSrc: Temporal   SpO2: 96%   Weight: 73.1 kg (161 lb 3.2 oz)   Height: 5' 1" (1.549 m)   Oxygen Therapy  SpO2: 96 %  . Wt Readings from Last 3 Encounters:   10/17/23 73.1 kg (161 lb 3.2 oz)   10/10/23 71.7 kg (158 lb)   09/18/23 71.7 kg (158 lb)     Body mass index is 30.46 kg/m². Physical Exam  Vitals and nursing note reviewed. Constitutional:       General: She is not in acute distress. Appearance: Normal appearance. She is well-developed. Cardiovascular:      Rate and Rhythm: Normal rate and regular rhythm. Heart sounds: Normal heart sounds, S1 normal and S2 normal. No murmur heard. Pulmonary:      Effort: Pulmonary effort is normal.      Breath sounds: Normal breath sounds. No decreased breath sounds, wheezing, rhonchi or rales. Musculoskeletal:         General: No swelling. Right lower leg: No edema. Left lower leg: No edema. Neurological:      Mental Status: She is alert. Psychiatric:         Mood and Affect: Mood and affect normal.         Behavior: Behavior normal. Behavior is cooperative. Labs: I have personally reviewed pertinent lab results.   Lab Results   Component Value Date    WBC 6.76 09/06/2023    HGB 14.3 09/06/2023    HCT 44.0 09/06/2023    MCV 96 09/06/2023     09/06/2023     Lab Results   Component Value Date    CALCIUM 9.5 09/06/2023     05/21/2018    K 4.2 09/06/2023    CO2 32 09/06/2023     09/06/2023    BUN 8 09/06/2023    CREATININE 0.50 (L) 09/06/2023     No results found for: "IGE"  Lab Results   Component Value Date    ALT 6 (L) 09/06/2023    AST 14 09/06/2023    ALKPHOS 44 09/06/2023    BILITOT 0.4 05/21/2018       Imaging and other studies: I have personally reviewed pertinent reports and I have personally reviewed pertinent films in PACS     CT chest 8/11/2023  Extensive predominantly subpleural perilymphatic reticulonodular densities, predominantly upper lung zones, likely infectious or inflammatory. Small consolidation in the subpleural right lower lobe. Consider follow-up. Coronary artery calcifications. Precarinal lymph node, mildly enlarged, possibly reactive. Chronic findings, as per the body of the report.     Pulmonary function testing: none prior for review

## 2023-10-17 NOTE — ASSESSMENT & PLAN NOTE
Patient with at least 50-pack-year smoking history, continues to smoke 1 pack/day. We discussed adverse health risks associated with continued tobacco/cigarette smoking including progressive lung disease, risk of MI, risk of PVD, delayed wound healing, cancer, etc. She had success previously with Chantix, but is not interested in trying to quit at this time. We will discuss again at her next office visit.

## 2023-10-17 NOTE — ASSESSMENT & PLAN NOTE
CT chest from 8/11 with extensive perilymphatic reticulonodular densities greater in the upper lung zones, likely infectious or inflammatory. I have ordered a repeat CT chest for 2-month follow-up. We will follow-up with patient with these results.

## 2023-10-31 ENCOUNTER — RA CDI HCC (OUTPATIENT)
Dept: OTHER | Facility: HOSPITAL | Age: 78
End: 2023-10-31

## 2023-10-31 NOTE — PROGRESS NOTES
[address] coding opportunities       Chart reviewed, no opportunity found: [address] Review     Patients Insurance     Medicare Insurance: [employer]

## 2023-11-07 ENCOUNTER — APPOINTMENT (OUTPATIENT)
Dept: LAB | Facility: CLINIC | Age: 78
End: 2023-11-07
Payer: COMMERCIAL

## 2023-11-07 DIAGNOSIS — N39.0 URINARY TRACT INFECTION WITHOUT HEMATURIA, SITE UNSPECIFIED: ICD-10-CM

## 2023-11-07 DIAGNOSIS — E11.65 UNCONTROLLED TYPE 2 DIABETES MELLITUS WITH HYPERGLYCEMIA (HCC): ICD-10-CM

## 2023-11-07 DIAGNOSIS — I10 ESSENTIAL HYPERTENSION: ICD-10-CM

## 2023-11-07 DIAGNOSIS — Z01.818 PRE-OP TESTING: ICD-10-CM

## 2023-11-07 LAB
ALBUMIN SERPL BCP-MCNC: 4.1 G/DL (ref 3.5–5)
ALP SERPL-CCNC: 50 U/L (ref 34–104)
ALT SERPL W P-5'-P-CCNC: 6 U/L (ref 7–52)
ANION GAP SERPL CALCULATED.3IONS-SCNC: 10 MMOL/L
AST SERPL W P-5'-P-CCNC: 14 U/L (ref 13–39)
BASOPHILS # BLD AUTO: 0.05 THOUSANDS/ÂΜL (ref 0–0.1)
BASOPHILS NFR BLD AUTO: 1 % (ref 0–1)
BILIRUB SERPL-MCNC: 0.36 MG/DL (ref 0.2–1)
BUN SERPL-MCNC: 8 MG/DL (ref 5–25)
CALCIUM SERPL-MCNC: 10 MG/DL (ref 8.4–10.2)
CHLORIDE SERPL-SCNC: 100 MMOL/L (ref 96–108)
CHOLEST SERPL-MCNC: 167 MG/DL
CO2 SERPL-SCNC: 28 MMOL/L (ref 21–32)
CREAT SERPL-MCNC: 0.49 MG/DL (ref 0.6–1.3)
CREAT UR-MCNC: 54.9 MG/DL
EOSINOPHIL # BLD AUTO: 0.22 THOUSAND/ÂΜL (ref 0–0.61)
EOSINOPHIL NFR BLD AUTO: 2 % (ref 0–6)
ERYTHROCYTE [DISTWIDTH] IN BLOOD BY AUTOMATED COUNT: 14 % (ref 11.6–15.1)
EST. AVERAGE GLUCOSE BLD GHB EST-MCNC: 140 MG/DL
GFR SERPL CREATININE-BSD FRML MDRD: 94 ML/MIN/1.73SQ M
GLUCOSE P FAST SERPL-MCNC: 134 MG/DL (ref 65–99)
HBA1C MFR BLD: 6.5 %
HCT VFR BLD AUTO: 46 % (ref 34.8–46.1)
HDLC SERPL-MCNC: 48 MG/DL
HGB BLD-MCNC: 15.2 G/DL (ref 11.5–15.4)
IMM GRANULOCYTES # BLD AUTO: 0.03 THOUSAND/UL (ref 0–0.2)
IMM GRANULOCYTES NFR BLD AUTO: 0 % (ref 0–2)
LDLC SERPL CALC-MCNC: 80 MG/DL (ref 0–100)
LYMPHOCYTES # BLD AUTO: 2.41 THOUSANDS/ÂΜL (ref 0.6–4.47)
LYMPHOCYTES NFR BLD AUTO: 25 % (ref 14–44)
MCH RBC QN AUTO: 32.2 PG (ref 26.8–34.3)
MCHC RBC AUTO-ENTMCNC: 33 G/DL (ref 31.4–37.4)
MCV RBC AUTO: 98 FL (ref 82–98)
MICROALBUMIN UR-MCNC: 7.5 MG/L
MICROALBUMIN/CREAT 24H UR: 14 MG/G CREATININE (ref 0–30)
MONOCYTES # BLD AUTO: 0.62 THOUSAND/ÂΜL (ref 0.17–1.22)
MONOCYTES NFR BLD AUTO: 6 % (ref 4–12)
NEUTROPHILS # BLD AUTO: 6.45 THOUSANDS/ÂΜL (ref 1.85–7.62)
NEUTS SEG NFR BLD AUTO: 66 % (ref 43–75)
NRBC BLD AUTO-RTO: 0 /100 WBCS
PLATELET # BLD AUTO: 259 THOUSANDS/UL (ref 149–390)
PMV BLD AUTO: 10.3 FL (ref 8.9–12.7)
POTASSIUM SERPL-SCNC: 4.9 MMOL/L (ref 3.5–5.3)
PROT SERPL-MCNC: 6.4 G/DL (ref 6.4–8.4)
RBC # BLD AUTO: 4.72 MILLION/UL (ref 3.81–5.12)
SODIUM SERPL-SCNC: 138 MMOL/L (ref 135–147)
TRIGL SERPL-MCNC: 193 MG/DL
TSH SERPL DL<=0.05 MIU/L-ACNC: 2.88 UIU/ML (ref 0.45–4.5)
WBC # BLD AUTO: 9.78 THOUSAND/UL (ref 4.31–10.16)

## 2023-11-07 PROCEDURE — 84443 ASSAY THYROID STIM HORMONE: CPT

## 2023-11-07 PROCEDURE — 36415 COLL VENOUS BLD VENIPUNCTURE: CPT

## 2023-11-07 PROCEDURE — 87086 URINE CULTURE/COLONY COUNT: CPT

## 2023-11-07 PROCEDURE — 82043 UR ALBUMIN QUANTITATIVE: CPT

## 2023-11-07 PROCEDURE — 83036 HEMOGLOBIN GLYCOSYLATED A1C: CPT

## 2023-11-07 PROCEDURE — 80061 LIPID PANEL: CPT

## 2023-11-07 PROCEDURE — 82570 ASSAY OF URINE CREATININE: CPT

## 2023-11-07 PROCEDURE — 80053 COMPREHEN METABOLIC PANEL: CPT

## 2023-11-08 LAB — BACTERIA UR CULT: NORMAL

## 2023-11-09 ENCOUNTER — OFFICE VISIT (OUTPATIENT)
Dept: FAMILY MEDICINE CLINIC | Facility: CLINIC | Age: 78
End: 2023-11-09
Payer: COMMERCIAL

## 2023-11-09 VITALS
WEIGHT: 156.3 LBS | SYSTOLIC BLOOD PRESSURE: 156 MMHG | BODY MASS INDEX: 29.51 KG/M2 | DIASTOLIC BLOOD PRESSURE: 100 MMHG | HEART RATE: 80 BPM | OXYGEN SATURATION: 94 % | HEIGHT: 61 IN | RESPIRATION RATE: 18 BRPM | TEMPERATURE: 98.1 F

## 2023-11-09 DIAGNOSIS — F32.A ANXIETY AND DEPRESSION: Chronic | ICD-10-CM

## 2023-11-09 DIAGNOSIS — Z01.818 PREOP EXAMINATION: Primary | ICD-10-CM

## 2023-11-09 DIAGNOSIS — K21.9 GERD WITHOUT ESOPHAGITIS: Chronic | ICD-10-CM

## 2023-11-09 DIAGNOSIS — I73.9 PVD (PERIPHERAL VASCULAR DISEASE) (HCC): ICD-10-CM

## 2023-11-09 DIAGNOSIS — F17.200 TOBACCO USE DISORDER: ICD-10-CM

## 2023-11-09 DIAGNOSIS — Z23 ENCOUNTER FOR IMMUNIZATION: ICD-10-CM

## 2023-11-09 DIAGNOSIS — J44.9 COPD, SEVERITY TO BE DETERMINED (HCC): ICD-10-CM

## 2023-11-09 DIAGNOSIS — R32 URINARY INCONTINENCE, UNSPECIFIED TYPE: ICD-10-CM

## 2023-11-09 DIAGNOSIS — Z01.818 PREOPERATIVE EXAMINATION: ICD-10-CM

## 2023-11-09 DIAGNOSIS — I10 ESSENTIAL HYPERTENSION: ICD-10-CM

## 2023-11-09 DIAGNOSIS — D49.4 BLADDER NEOPLASM: ICD-10-CM

## 2023-11-09 DIAGNOSIS — J45.21 MILD INTERMITTENT REACTIVE AIRWAY DISEASE WITH ACUTE EXACERBATION: ICD-10-CM

## 2023-11-09 DIAGNOSIS — E11.42 TYPE 2 DIABETES MELLITUS WITH DIABETIC POLYNEUROPATHY, WITHOUT LONG-TERM CURRENT USE OF INSULIN (HCC): Chronic | ICD-10-CM

## 2023-11-09 DIAGNOSIS — M51.36 DEGENERATIVE LUMBAR DISC: ICD-10-CM

## 2023-11-09 DIAGNOSIS — N28.89 LEFT RENAL MASS: ICD-10-CM

## 2023-11-09 DIAGNOSIS — F41.9 ANXIETY AND DEPRESSION: Chronic | ICD-10-CM

## 2023-11-09 PROCEDURE — 99215 OFFICE O/P EST HI 40 MIN: CPT | Performed by: FAMILY MEDICINE

## 2023-11-09 PROCEDURE — G0008 ADMIN INFLUENZA VIRUS VAC: HCPCS | Performed by: FAMILY MEDICINE

## 2023-11-09 PROCEDURE — 93000 ELECTROCARDIOGRAM COMPLETE: CPT | Performed by: FAMILY MEDICINE

## 2023-11-09 PROCEDURE — 90662 IIV NO PRSV INCREASED AG IM: CPT | Performed by: FAMILY MEDICINE

## 2023-11-09 RX ORDER — PANTOPRAZOLE SODIUM 40 MG/1
40 TABLET, DELAYED RELEASE ORAL
COMMUNITY
Start: 2023-10-31

## 2023-11-09 NOTE — PROGRESS NOTES
FAMILY MEDICINE PRE-OPERATIVE EVALUATION  [facility] PHYSICIAN GROUP - [facility] [address] [facility] PRACTICE    NAME: Wilmar Cotto  AGE: 69 y.o. SEX: female  : 1945     DATE: 2023     Family Medicine Pre-Operative Evaluation:     Chief Complaint: Pre-operative Evaluation     Surgery: Cystoscopy retrograde laser ablation of left renal collecting system tumor  Anticipated Date of Surgery: 2023  Referring Provider: Dr. Bay Lynch    History of Present Illness: Wilmar Cotto is a 69 y.o. female who presents to the office today for a preoperative consultation at the request of surgeon, Dr. Bay Lynch, who plans on performing cystoscopy retrograde laser ablation of left renal collecting system tumor on 2023. Planned anesthesia is general. Patient has a bleeding risk of: no recent abnormal bleeding. Patient does not have objections to receiving blood products if needed. Current anti-platelet/anti-coagulation medications that the patient is prescribed includes: Aspirin. Assessment of Chronic Conditions:   Reviewed all preoperative conditions with patient at this time         Review of Systems:     Review of Systems   Constitutional:  Negative for chills and fever. HENT:  Negative for ear pain and sore throat. Eyes:  Negative for pain and visual disturbance. Respiratory:  Negative for cough and shortness of breath. Cardiovascular:  Negative for chest pain and palpitations. Gastrointestinal:  Negative for abdominal pain and vomiting. Genitourinary:  Negative for dysuria and hematuria. Musculoskeletal:  Negative for arthralgias and back pain. Skin:  Negative for color change and rash. Neurological:  Negative for seizures and syncope. All other systems reviewed and are negative.        Problem List:     Patient Active Problem List   Diagnosis    Type 2 diabetes mellitus with diabetic polyneuropathy ([address])    Essential hypertension    Anxiety and depression    Medicare annual wellness visit, subsequent    GERD without esophagitis    Reactive airway disease with acute exacerbation    Urinary incontinence    Degenerative lumbar disc    Hypokalemia    Hematuria    Elevated troponin    Lactic acidosis    Hypomagnesemia    Preoperative examination    Bladder neoplasm    Left renal mass    PVD (peripheral vascular disease) (HCC)    Abnormal CT of the chest    COPD, severity to be determined ([address])    Tobacco use disorder        Allergies:      Allergies   Allergen Reactions    Adhesive [Medical Tape] Itching    Carafate [Sucralfate] Other (See Comments)     Mouth sores    Latex     Paroxetine     Sulfa Antibiotics     Sulfamethoxazole-Trimethoprim Hives        Current Medications:       Current Outpatient Medications:     acetaminophen (TYLENOL) 325 mg tablet, Take 650 mg by mouth every 6 (six) hours as needed for mild pain, Disp: , Rfl:     albuterol (Ventolin HFA) 90 mcg/act inhaler, Inhale 2 puffs every 6 (six) hours as needed for wheezing, Disp: 18 g, Rfl: 0    AMILoride 5 mg tablet, Take 1 tablet (5 mg total) by mouth daily, Disp: 90 tablet, Rfl: 5    Apoaequorin (Prevagen) 10 MG CAPS, Take by mouth, Disp: , Rfl:     Aspirin (ASPIR-81 PO), take one by mouth daily, Disp: , Rfl:     atorvastatin (LIPITOR) 10 mg tablet, Take 1 tablet (10 mg total) by mouth daily with dinner, Disp: 90 tablet, Rfl: 1    Blood Glucose Monitoring Suppl (ONETOUCH VERIO) w/Device KIT, by Does not apply route 2 (two) times a day, Disp: 1 kit, Rfl: 0    buPROPion (WELLBUTRIN XL) 300 mg 24 hr tablet, Take 1 tablet (300 mg total) by mouth daily, Disp: 90 tablet, Rfl: 1    clonazePAM (KlonoPIN) 1 mg tablet, Take 1 tablet (1 mg total) by mouth every evening, Disp: 30 tablet, Rfl: 3    Fluticasone-Salmeterol (Advair Diskus) 250-50 mcg/dose inhaler, Inhale 1 puff 2 (two) times a day Rinse mouth after use., Disp: 60 blister, Rfl: 0    glucose blood (OneTouch Verio) test strip, TEST BLOOD GLUCOSE TWICE A DAY, Disp: 100 strip, Rfl: 5    Januvia 100 MG tablet, Take 1 tablet (100 mg total) by mouth daily, Disp: 90 tablet, Rfl: 2    Lancets (OneTouch Delica Plus [id number]) MISC, TEST TWICE A DAY, Disp: 100 each, Rfl: 5    lisinopril (ZESTRIL) 5 mg tablet, Take 1 tablet (5 mg total) by mouth daily, Disp: 90 tablet, Rfl: 5    metFORMIN (GLUCOPHAGE-XR) 500 mg 24 hr tablet, Take 2 tablets (1,000 mg total) by mouth 2 (two) times a day, Disp: 360 tablet, Rfl: 1    Misc.  Devices (Carex Coccyx Cushion) MISC, Use in the morning, Disp: 1 each, Rfl: 0    Omega-3 Fatty Acids (FISH OIL PO), , Disp: , Rfl:     pantoprazole (PROTONIX) 40 mg tablet, 40 mg, Disp: , Rfl:     pregabalin (LYRICA) 200 MG capsule, TAKE ONE CAPSULE BY MOUTH THREE TIMES A DAY, Disp: 90 capsule, Rfl: 2    solifenacin (VESICARE) 5 mg tablet, Take 1 tablet (5 mg total) by mouth daily, Disp: 90 tablet, Rfl: 2    sucralfate (CARAFATE) 1 g tablet, Take 1 tablet (1 g total) by mouth 4 (four) times a day (Patient not taking: Reported on 10/17/2023), Disp: 120 tablet, Rfl: 3     Past History:     Past Medical History:   Diagnosis Date    Anxiety     Closed fracture of coccyx ([address]) 2023    Diabetes mellitus (HCC)     GERD (gastroesophageal reflux disease)     Hyperlipidemia     Hypertension     IBS (irritable bowel syndrome)     Shoulder fracture         Past Surgical History:   Procedure Laterality Date    ANKLE FRACTURE SURGERY Right     has screw in place     SECTION      x2    CHOLECYSTECTOMY      CYSTOSCOPY W/ LASER LITHOTRIPSY Left 2023    Procedure: CYSTOSCOPY; RETROGRADE; URETEROSCOPY; BIOPSY; LEFT -INSERTION OF STENT;  Surgeon: Neyda Duncan MD;  Location: [facility];  Service: Urology    FL RETROGRADE PYELOGRAM  2023    HERNIA REPAIR      umbilicus w/ mesh        Family History   Problem Relation Age of Onset    COPD Mother     Emphysema Mother     COPD Father     Emphysema Father         Social History     Socioeconomic History    Marital status: /Civil [employer]     Spouse name: Not on file    Number of children: Not on file    Years of education: Not on file    Highest education level: Not on file   Occupational History    Not on file   Tobacco Use    Smoking status: Every Day     Packs/day: 1.00     Years: 50.00     Total pack years: 50.00     Types: Cigarettes     Start date: 1961    Smokeless tobacco: Never    Tobacco comments:     on & off    Vaping Use    Vaping Use: Never used   Substance and Sexual Activity    Alcohol use: Not Currently    Drug use: Never    Sexual activity: Not on file   Other Topics Concern    Not on file   Social History Narrative    Not on file     Social Determinants of Health     Financial Resource Strain: Low Risk  (2/9/2023)    Overall Financial Resource Strain (CARDIA)     Difficulty of Paying Living Expenses: Not hard at all   Food Insecurity: No Food Insecurity (8/11/2023)    Hunger Vital Sign     Worried About Running Out of Food in the Last Year: Never true     [address] in the Last Year: Never true   Transportation Needs: No Transportation Needs (8/11/2023)    PRAPARE - Transportation     Lack of Transportation (Medical): No     Lack of Transportation (Non-Medical): No   Physical Activity: Not on file   Stress: Not on file   Social Connections: Not on file   Intimate Partner Violence: Not on file   Housing Stability: Low Risk  (8/11/2023)    Housing Stability Vital Sign     Unable to Pay for Housing in the Last Year: No     Number of Places Lived in the Last Year: 1     Unstable Housing in the Last Year: No        Physical Exam:      /100 (BP Location: Left arm, Patient Position: Sitting, Cuff Size: Standard)   Pulse 80   Temp 98.1 °F (36.7 °C) (Tympanic)   Resp 18   Ht 5' 1" (1.549 m)   Wt 70.9 kg (156 lb 4.8 oz)   SpO2 94%   BMI 29.53 kg/m²     Physical Exam  Vitals and nursing note reviewed. Constitutional:       General: She is not in acute distress. Appearance: Normal appearance.  She is well-developed. HENT:      Head: Normocephalic and atraumatic. Right Ear: Tympanic membrane and external ear normal.      Left Ear: Tympanic membrane and external ear normal.      Nose: Nose normal.      Mouth/Throat:      Mouth: Mucous membranes are moist.      Pharynx: Oropharynx is clear. No oropharyngeal exudate. Eyes:      General: No scleral icterus. Right eye: No discharge. Left eye: No discharge. Conjunctiva/sclera: Conjunctivae normal.      Pupils: Pupils are equal, round, and reactive to light. Neck:      Thyroid: No thyromegaly. Vascular: No JVD. Cardiovascular:      Rate and Rhythm: Normal rate and regular rhythm. Pulses: Normal pulses. Heart sounds: Normal heart sounds. No murmur heard. Pulmonary:      Effort: Pulmonary effort is normal.      Breath sounds: No wheezing or rales. Chest:      Chest wall: No tenderness. Abdominal:      General: Bowel sounds are normal. There is no distension. Palpations: Abdomen is soft. There is no mass. Tenderness: There is no abdominal tenderness. Musculoskeletal:         General: No tenderness or deformity. Normal range of motion. Cervical back: Normal range of motion. Lymphadenopathy:      Cervical: No cervical adenopathy. Skin:     General: Skin is warm and dry. Capillary Refill: Capillary refill takes less than 2 seconds. Findings: No rash. Neurological:      General: No focal deficit present. Mental Status: She is alert and oriented to person, place, and time. Cranial Nerves: No cranial nerve deficit. Coordination: Coordination normal.      Deep Tendon Reflexes: Reflexes are normal and symmetric. Reflexes normal.   Psychiatric:         Mood and Affect: Mood normal.         Behavior: Behavior normal.         Thought Content:  Thought content normal.         Judgment: Judgment normal.     I have spent a total time of 55 minutes on 11/09/23 in caring for this patient including Diagnostic results, Prognosis, Risks and benefits of tx options, Instructions for management, Patient and family education, Importance of tx compliance, Risk factor reductions, Impressions, Counseling / Coordination of care, Documenting in the medical record, Reviewing / ordering tests, medicine, procedures  , Obtaining or reviewing history  , and Communicating with other healthcare professionals . Data:     Pre-operative work-up    Laboratory Results: I have personally reviewed the pertinent laboratory results/reports     EKG: I have personally reviewed pertinent reports. Chest x-ray: I have personally reviewed pertinent reports. Previous cardiopulmonary studies within the past year:  Reviewed       Assessment:     1. Preop examination  POCT ECG      2. Preoperative examination        3. Left renal mass        4. Essential hypertension        5. Type 2 diabetes mellitus with diabetic polyneuropathy, without long-term current use of insulin ([address])        6. Encounter for immunization  influenza vaccine, high-dose, PF 0.7 mL (FLUZONE HIGH-DOSE)      7. GERD without esophagitis        8. PVD (peripheral vascular disease) ([address])        9. COPD, severity to be determined ([address])        10. Mild intermittent reactive airway disease with acute exacerbation        11. Degenerative lumbar disc        12. Bladder neoplasm        13. Urinary incontinence, unspecified type        14. Tobacco use disorder        15. Anxiety and depression             Plan:     69 y.o. female with planned surgery: Cystoscopy retrograde laser ablation of left renal collecting system tumor. Cardiac Risk Estimation: per the Revised Cardiac Risk Index (Circ. 100:1043, 1999), the patient's risk factors for cardiac complications include hypertension diabetes peripheral vascular disease COPD, putting her in: RCI RISK CLASS II (1 risk factor, risk of major cardiac compl. appr. 1.3%).     1. Further preoperative workup as follows:   - None; no further preoperative work-up is required    2. Medication Management/Recommendations:   - None, continue medication regimen including morning of surgery, with sip of water    3. Prophylaxis for cardiac events with perioperative beta-blockers: not indicated. 4. Patient requires further consultation with: None    Clearance  Patient is CLEARED for surgery without any additional cardiac testing.      Patricia Dodd DO  [address] [address]  Phone#  [phone]  Fax#  [phone]

## 2023-11-09 NOTE — LETTER
2023     Mahsa Fitzpatrick, [address]    Patient: Jacques Hudson   YOB: 1945   Date of Visit: 2023       Dear Dr. Florence: Thank you for referring Jacques Hudson to me for evaluation. Below are my notes for this consultation. If you have questions, please do not hesitate to call me. I look forward to following your patient along with you. Sincerely,        Roldan Bledsoe DO        CC: No Recipients    Roldan Bledsoe DO  2023 11:43 AM  Incomplete  FAMILY MEDICINE PRE-OPERATIVE EVALUATION  [facility] PHYSICIAN [facility] [address] [facility]    NAME: Jacques Hudson  AGE: 69 y.o. SEX: female  : 1945     DATE: 2023     Family Medicine Pre-Operative Evaluation:     Chief Complaint: Pre-operative Evaluation     Surgery: Cystoscopy retrograde laser ablation of left renal collecting system tumor  Anticipated Date of Surgery: 2023  Referring Provider: Dr. Florence    History of Present Illness: Jacques Hudson is a 69 y.o. female who presents to the office today for a preoperative consultation at the request of surgeon, Dr. Florence, who plans on performing cystoscopy retrograde laser ablation of left renal collecting system tumor on 2023. Planned anesthesia is general. Patient has a bleeding risk of: no recent abnormal bleeding. Patient does not have objections to receiving blood products if needed. Current anti-platelet/anti-coagulation medications that the patient is prescribed includes: Aspirin. Assessment of Chronic Conditions:   Reviewed all preoperative conditions with patient at this time         Review of Systems:     Review of Systems   Constitutional:  Negative for chills and fever. HENT:  Negative for ear pain and sore throat. Eyes:  Negative for pain and visual disturbance. Respiratory:  Negative for cough and shortness of breath.     Cardiovascular:  Negative for chest pain and palpitations. Gastrointestinal:  Negative for abdominal pain and vomiting. Genitourinary:  Negative for dysuria and hematuria. Musculoskeletal:  Negative for arthralgias and back pain. Skin:  Negative for color change and rash. Neurological:  Negative for seizures and syncope. All other systems reviewed and are negative. Problem List:     Patient Active Problem List   Diagnosis   • Type 2 diabetes mellitus with diabetic polyneuropathy ([address])   • Essential hypertension   • Anxiety and depression   • Medicare annual wellness visit, subsequent   • GERD without esophagitis   • Reactive airway disease with acute exacerbation   • Urinary incontinence   • Degenerative lumbar disc   • Hypokalemia   • Hematuria   • Elevated troponin   • Lactic acidosis   • Hypomagnesemia   • Preoperative examination   • Bladder neoplasm   • Left renal mass   • PVD (peripheral vascular disease) ([facility])   • Abnormal CT of the chest   • COPD, severity to be determined ([address])   • Tobacco use disorder        Allergies:      Allergies   Allergen Reactions   • Adhesive [Medical Tape] Itching   • Carafate [Sucralfate] Other (See Comments)     Mouth sores   • Latex    • Paroxetine    • Sulfa Antibiotics    • Sulfamethoxazole-Trimethoprim Hives        Current Medications:       Current Outpatient Medications:   •  acetaminophen (TYLENOL) 325 mg tablet, Take 650 mg by mouth every 6 (six) hours as needed for mild pain, Disp: , Rfl:   •  albuterol (Ventolin HFA) 90 mcg/act inhaler, Inhale 2 puffs every 6 (six) hours as needed for wheezing, Disp: 18 g, Rfl: 0  •  AMILoride 5 mg tablet, Take 1 tablet (5 mg total) by mouth daily, Disp: 90 tablet, Rfl: 5  •  Apoaequorin (Prevagen) 10 MG CAPS, Take by mouth, Disp: , Rfl:   •  Aspirin (ASPIR-81 PO), take one by mouth daily, Disp: , Rfl:   •  atorvastatin (LIPITOR) 10 mg tablet, Take 1 tablet (10 mg total) by mouth daily with dinner, Disp: 90 tablet, Rfl: 1  •  Blood Glucose Monitoring Suppl (Dontrell Yoon) w/Device KIT, by Does not apply route 2 (two) times a day, Disp: 1 kit, Rfl: 0  •  buPROPion (WELLBUTRIN XL) 300 mg 24 hr tablet, Take 1 tablet (300 mg total) by mouth daily, Disp: 90 tablet, Rfl: 1  •  clonazePAM (KlonoPIN) 1 mg tablet, Take 1 tablet (1 mg total) by mouth every evening, Disp: 30 tablet, Rfl: 3  •  Fluticasone-Salmeterol (Advair Diskus) 250-50 mcg/dose inhaler, Inhale 1 puff 2 (two) times a day Rinse mouth after use., Disp: 60 blister, Rfl: 0  •  glucose blood (OneTouch Verio) test strip, TEST BLOOD GLUCOSE TWICE A DAY, Disp: 100 strip, Rfl: 5  •  Januvia 100 MG tablet, Take 1 tablet (100 mg total) by mouth daily, Disp: 90 tablet, Rfl: 2  •  Lancets (OneTouch Delica Plus CRYYLG21F) MISC, TEST TWICE A DAY, Disp: 100 each, Rfl: 5  •  lisinopril (ZESTRIL) 5 mg tablet, Take 1 tablet (5 mg total) by mouth daily, Disp: 90 tablet, Rfl: 5  •  metFORMIN (GLUCOPHAGE-XR) 500 mg 24 hr tablet, Take 2 tablets (1,000 mg total) by mouth 2 (two) times a day, Disp: 360 tablet, Rfl: 1  •  Misc.  Devices (Carex Coccyx Cushion) MISC, Use in the morning, Disp: 1 each, Rfl: 0  •  Omega-3 Fatty Acids (FISH OIL PO), , Disp: , Rfl:   •  pantoprazole (PROTONIX) 40 mg tablet, 40 mg, Disp: , Rfl:   •  pregabalin (LYRICA) 200 MG capsule, TAKE ONE CAPSULE BY MOUTH THREE TIMES A DAY, Disp: 90 capsule, Rfl: 2  •  solifenacin (VESICARE) 5 mg tablet, Take 1 tablet (5 mg total) by mouth daily, Disp: 90 tablet, Rfl: 2  •  sucralfate (CARAFATE) 1 g tablet, Take 1 tablet (1 g total) by mouth 4 (four) times a day (Patient not taking: Reported on 10/17/2023), Disp: 120 tablet, Rfl: 3     Past History:     Past Medical History:   Diagnosis Date   • Anxiety    • Closed fracture of coccyx ([address]) 05/24/2023   • Diabetes mellitus ([address])    • GERD (gastroesophageal reflux disease)    • Hyperlipidemia    • Hypertension    • IBS (irritable bowel syndrome)    • Shoulder fracture         Past Surgical History:   Procedure Laterality Date   • ANKLE FRACTURE SURGERY Right     has screw in place   •  SECTION      x2   • CHOLECYSTECTOMY     • CYSTOSCOPY W/ LASER LITHOTRIPSY Left 2023    Procedure: CYSTOSCOPY; RETROGRADE; URETEROSCOPY; BIOPSY; LEFT -INSERTION OF STENT;  Surgeon: Lawanda Brittle, MD;  Location: [facility];  Service: Urology   • FL RETROGRADE PYELOGRAM  2023   • HERNIA REPAIR      umbilicus w/ mesh        Family History   Problem Relation Age of Onset   • COPD Mother    • Emphysema Mother    • COPD Father    • Emphysema Father         Social History     Socioeconomic History   • Marital status: /Civil Union     Spouse name: Not on file   • Number of children: Not on file   • Years of education: Not on file   • Highest education level: Not on file   Occupational History   • Not on file   Tobacco Use   • Smoking status: Every Day     Packs/day: 1.00     Years: 50.00     Total pack years: 50.00     Types: Cigarettes     Start date:    • Smokeless tobacco: Never   • Tobacco comments:     on & off    Vaping Use   • Vaping Use: Never used   Substance and Sexual Activity   • Alcohol use: Not Currently   • Drug use: Never   • Sexual activity: Not on file   Other Topics Concern   • Not on file   Social History Narrative   • Not on file     Social Determinants of Health     Financial Resource Strain: Low Risk  (2023)    Overall Financial Resource Strain (CARDIA)    • Difficulty of Paying Living Expenses: Not hard at all   Food Insecurity: No Food Insecurity (2023)    Hunger Vital Sign    • Worried About Running Out of Food in the Last Year: Never true    • Ran Out of Food in the Last Year: Never true   Transportation Needs: No Transportation Needs (2023)    PRAPARE - Transportation    • Lack of Transportation (Medical): No    • Lack of Transportation (Non-Medical):  No   Physical Activity: Not on file   Stress: Not on file   Social Connections: Not on file   Intimate Partner Violence: Not on file Housing Stability: Low Risk  (8/11/2023)    Housing Stability Vital Sign    • Unable to Pay for Housing in the Last Year: No    • Number of Places Lived in the Last Year: 1    • Unstable Housing in the Last Year: No        Physical Exam:      /100 (BP Location: Left arm, Patient Position: Sitting, Cuff Size: Standard)   Pulse 80   Temp 98.1 °F (36.7 °C) (Tympanic)   Resp 18   Ht 5' 1" (1.549 m)   Wt 70.9 kg (156 lb 4.8 oz)   SpO2 94%   BMI 29.53 kg/m²     Physical Exam  Vitals and nursing note reviewed. Constitutional:       General: She is not in acute distress. Appearance: Normal appearance. She is well-developed. HENT:      Head: Normocephalic and atraumatic. Right Ear: Tympanic membrane and external ear normal.      Left Ear: Tympanic membrane and external ear normal.      Nose: Nose normal.      Mouth/Throat:      Mouth: Mucous membranes are moist.      Pharynx: Oropharynx is clear. No oropharyngeal exudate. Eyes:      General: No scleral icterus. Right eye: No discharge. Left eye: No discharge. Conjunctiva/sclera: Conjunctivae normal.      Pupils: Pupils are equal, round, and reactive to light. Neck:      Thyroid: No thyromegaly. Vascular: No JVD. Cardiovascular:      Rate and Rhythm: Normal rate and regular rhythm. Pulses: Normal pulses. Heart sounds: Normal heart sounds. No murmur heard. Pulmonary:      Effort: Pulmonary effort is normal.      Breath sounds: No wheezing or rales. Chest:      Chest wall: No tenderness. Abdominal:      General: Bowel sounds are normal. There is no distension. Palpations: Abdomen is soft. There is no mass. Tenderness: There is no abdominal tenderness. Musculoskeletal:         General: No tenderness or deformity. Normal range of motion. Cervical back: Normal range of motion. Lymphadenopathy:      Cervical: No cervical adenopathy. Skin:     General: Skin is warm and dry.       Capillary Refill: Capillary refill takes less than 2 seconds. Findings: No rash. Neurological:      General: No focal deficit present. Mental Status: She is alert and oriented to person, place, and time. Cranial Nerves: No cranial nerve deficit. Coordination: Coordination normal.      Deep Tendon Reflexes: Reflexes are normal and symmetric. Reflexes normal.   Psychiatric:         Mood and Affect: Mood normal.         Behavior: Behavior normal.         Thought Content: Thought content normal.         Judgment: Judgment normal.           Data:     Pre-operative work-up    Laboratory Results: I have personally reviewed the pertinent laboratory results/reports     EKG: I have personally reviewed pertinent reports. Chest x-ray: I have personally reviewed pertinent reports. Previous cardiopulmonary studies within the past year:  Reviewed       Assessment:     1. Preop examination  POCT ECG      2. Preoperative examination        3. Left renal mass        4. Essential hypertension        5. Type 2 diabetes mellitus with diabetic polyneuropathy, without long-term current use of insulin ([address])        6. Encounter for immunization  influenza vaccine, high-dose, PF 0.7 mL (FLUZONE HIGH-DOSE)      7. GERD without esophagitis        8. PVD (peripheral vascular disease) ([address])        9. COPD, severity to be determined ([address])        10. Mild intermittent reactive airway disease with acute exacerbation        11. Degenerative lumbar disc        12. Bladder neoplasm        13. Urinary incontinence, unspecified type        14. Tobacco use disorder        15. Anxiety and depression             Plan:     69 y.o. female with planned surgery: Cystoscopy retrograde laser ablation of left renal collecting system tumor.       Cardiac Risk Estimation: per the Revised Cardiac Risk Index (Circ. 100:1043, 1999), the patient's risk factors for cardiac complications include hypertension diabetes peripheral vascular disease COPD, putting her in: RCI RISK CLASS II (1 risk factor, risk of major cardiac compl. appr. 1.3%). 1. Further preoperative workup as follows:   - None; no further preoperative work-up is required    2. Medication Management/Recommendations:   - None, continue medication regimen including morning of surgery, with sip of water    3. Prophylaxis for cardiac events with perioperative beta-blockers: not indicated. 4. Patient requires further consultation with: None    Clearance  Patient is CLEARED for surgery without any additional cardiac testing.      Kitty Daly DO  [address] [address]  Phone#  [phone]  Fax#  [phone]

## 2023-11-10 ENCOUNTER — HOSPITAL ENCOUNTER (OUTPATIENT)
Dept: NON INVASIVE DIAGNOSTICS | Facility: HOSPITAL | Age: 78
Discharge: HOME | End: 2023-11-10
Attending: INTERNAL MEDICINE

## 2023-11-10 DIAGNOSIS — E11.65 UNCONTROLLED TYPE 2 DIABETES MELLITUS WITH HYPERGLYCEMIA (HCC): ICD-10-CM

## 2023-11-10 DIAGNOSIS — I73.9 PVD (PERIPHERAL VASCULAR DISEASE) (HCC): ICD-10-CM

## 2023-11-10 DIAGNOSIS — J44.9 COPD (CHRONIC OBSTRUCTIVE PULMONARY DISEASE) (HCC): ICD-10-CM

## 2023-11-10 RX ORDER — FLUTICASONE PROPIONATE AND SALMETEROL 250; 50 UG/1; UG/1
1 POWDER RESPIRATORY (INHALATION) 2 TIMES DAILY
Qty: 60 BLISTER | Refills: 0 | Status: SHIPPED | OUTPATIENT
Start: 2023-11-10 | End: 2023-12-10

## 2023-11-10 RX ORDER — ALBUTEROL SULFATE 90 UG/1
2 AEROSOL, METERED RESPIRATORY (INHALATION) EVERY 6 HOURS PRN
Qty: 18 G | Refills: 0 | Status: SHIPPED | OUTPATIENT
Start: 2023-11-10

## 2023-11-10 RX ORDER — METFORMIN HYDROCHLORIDE 500 MG/1
1000 TABLET, EXTENDED RELEASE ORAL 2 TIMES DAILY
Qty: 360 TABLET | Refills: 1 | Status: SHIPPED | OUTPATIENT
Start: 2023-11-10

## 2023-11-14 NOTE — PRE-PROCEDURE INSTRUCTIONS
Pre-Surgery Instructions:   Medication Instructions    acetaminophen (TYLENOL) 325 mg tablet Uses PRN- OK to take day of surgery    albuterol (Ventolin HFA) 90 mcg/act inhaler Uses PRN- OK to take day of surgery    AMILoride 5 mg tablet Hold day of surgery. Apoaequorin (Prevagen) 10 MG CAPS Stop taking 7 days prior to surgery. Aspirin (ASPIR-81 PO) Instructions provided by MD    atorvastatin (LIPITOR) 10 mg tablet Take day of surgery. buPROPion (WELLBUTRIN XL) 300 mg 24 hr tablet Take day of surgery. clonazePAM (KlonoPIN) 1 mg tablet Take night before surgery    Fluticasone-Salmeterol (Advair Diskus) 250-50 mcg/dose inhaler Take day of surgery. Januvia 100 MG tablet Hold day of surgery. lisinopril (ZESTRIL) 5 mg tablet Hold day of surgery. metFORMIN (GLUCOPHAGE-XR) 500 mg 24 hr tablet Hold day of surgery. Omega-3 Fatty Acids (FISH OIL PO) Stop taking 7 days prior to surgery. pantoprazole (PROTONIX) 40 mg tablet Take day of surgery. pregabalin (LYRICA) 200 MG capsule Take day of surgery. solifenacin (VESICARE) 5 mg tablet Hold day of surgery. Medication instructions for day surgery reviewed. Please use only a sip of water to take your instructed medications. Avoid all over the counter vitamins, supplements and NSAIDS for one week prior to surgery per anesthesia guidelines. Tylenol is ok to take as needed. You will receive a call one business day prior to surgery with an arrival time and hospital directions. If your surgery is scheduled on a Monday, the hospital will be calling you on the Friday prior to your surgery. If you have not heard from anyone by 8pm, please call the hospital supervisor through the hospital  at [phone]. Gabino Pace [phone]). Do not eat or drink anything after midnight the night before your surgery, including candy, mints, lifesavers, or chewing gum. Do not drink alcohol 24hrs before your surgery.  Try not to smoke at least 24hrs before your surgery. Follow the pre surgery showering instructions as listed in the [facility] Surgical Experience Booklet” or otherwise provided by your surgeon's office. Do not use a blade to shave the surgical area 1 week before surgery. It is okay to use a clean electric clippers up to 24 hours before surgery. Do not apply any lotions, creams, including makeup, cologne, deodorant, or perfumes after showering on the day of your surgery. Do not use dry shampoo, hair spray, hair gel, or any type of hair products. No contact lenses, eye make-up, or artificial eyelashes. Remove nail polish, including gel polish, and any artificial, gel, or acrylic nails if possible. Remove all jewelry including rings and body piercing jewelry. Wear causal clothing that is easy to take on and off. Consider your type of surgery. Keep any valuables, jewelry, piercings at home. Please bring any specially ordered equipment (sling, braces) if indicated. Arrange for a responsible person to drive you to and from the hospital on the day of your surgery. Visitor Guidelines discussed. Call the surgeon's office with any new illnesses, exposures, or additional questions prior to surgery. Please reference your [facility] Surgical Experience Booklet” for additional information to prepare for your upcoming surgery.

## 2023-11-16 ENCOUNTER — RA CDI HCC (OUTPATIENT)
Dept: OTHER | Facility: HOSPITAL | Age: 78
End: 2023-11-16

## 2023-11-16 NOTE — PROGRESS NOTES
[address] coding opportunities       Chart reviewed, no opportunity found: [address] Review     Patients Insurance     Medicare Insurance: [employer]

## 2023-11-18 ENCOUNTER — ANESTHESIA EVENT (OUTPATIENT)
Dept: PERIOP | Facility: HOSPITAL | Age: 78
End: 2023-11-18
Payer: COMMERCIAL

## 2023-11-20 ENCOUNTER — ANESTHESIA (OUTPATIENT)
Dept: PERIOP | Facility: HOSPITAL | Age: 78
End: 2023-11-20
Payer: COMMERCIAL

## 2023-11-20 ENCOUNTER — HOSPITAL ENCOUNTER (OUTPATIENT)
Facility: HOSPITAL | Age: 78
Setting detail: OUTPATIENT SURGERY
Discharge: HOME | End: 2023-11-20
Attending: UROLOGY | Admitting: UROLOGY
Payer: COMMERCIAL

## 2023-11-20 ENCOUNTER — APPOINTMENT (OUTPATIENT)
Dept: RADIOLOGY | Facility: HOSPITAL | Age: 78
End: 2023-11-20
Payer: COMMERCIAL

## 2023-11-20 VITALS
BODY MASS INDEX: 29.45 KG/M2 | HEIGHT: 61 IN | RESPIRATION RATE: 22 BRPM | TEMPERATURE: 97.6 F | WEIGHT: 156 LBS | HEART RATE: 70 BPM | DIASTOLIC BLOOD PRESSURE: 65 MMHG | SYSTOLIC BLOOD PRESSURE: 140 MMHG | OXYGEN SATURATION: 92 %

## 2023-11-20 DIAGNOSIS — C65.9: ICD-10-CM

## 2023-11-20 LAB
BILIRUB UR QL STRIP: NEGATIVE
CLARITY UR: CLEAR
COLOR UR: YELLOW
GLUCOSE SERPL-MCNC: 124 MG/DL (ref 65–140)
GLUCOSE SERPL-MCNC: 132 MG/DL (ref 65–140)
GLUCOSE UR STRIP-MCNC: NEGATIVE MG/DL
HGB UR QL STRIP.AUTO: NEGATIVE
KETONES UR STRIP-MCNC: NEGATIVE MG/DL
LEUKOCYTE ESTERASE UR QL STRIP: NEGATIVE
NITRITE UR QL STRIP: NEGATIVE
PH UR STRIP.AUTO: 5 [PH]
PROT UR STRIP-MCNC: NEGATIVE MG/DL
SP GR UR STRIP.AUTO: 1.01
UROBILINOGEN UR QL STRIP.AUTO: 0.2 E.U./DL

## 2023-11-20 PROCEDURE — 74420 UROGRAPHY RTRGR +-KUB: CPT

## 2023-11-20 PROCEDURE — 87086 URINE CULTURE/COLONY COUNT: CPT | Performed by: UROLOGY

## 2023-11-20 PROCEDURE — C1769 GUIDE WIRE: HCPCS | Performed by: UROLOGY

## 2023-11-20 PROCEDURE — C1747 URETEROSCOPE DIGITAL FLEX SNGL USE STD DEFLECTION APTRA: HCPCS | Performed by: UROLOGY

## 2023-11-20 PROCEDURE — 82948 REAGENT STRIP/BLOOD GLUCOSE: CPT

## 2023-11-20 PROCEDURE — 81003 URINALYSIS AUTO W/O SCOPE: CPT | Performed by: UROLOGY

## 2023-11-20 PROCEDURE — C2617 STENT, NON-COR, TEM W/O DEL: HCPCS | Performed by: UROLOGY

## 2023-11-20 PROCEDURE — C1894 INTRO/SHEATH, NON-LASER: HCPCS | Performed by: UROLOGY

## 2023-11-20 RX ORDER — ONDANSETRON 2 MG/ML
4 INJECTION INTRAMUSCULAR; INTRAVENOUS ONCE AS NEEDED
Status: DISCONTINUED | OUTPATIENT
Start: 2023-11-20 | End: 2023-11-20 | Stop reason: HOSPADM

## 2023-11-20 RX ORDER — LIDOCAINE HYDROCHLORIDE 10 MG/ML
INJECTION, SOLUTION EPIDURAL; INFILTRATION; INTRACAUDAL; PERINEURAL AS NEEDED
Status: DISCONTINUED | OUTPATIENT
Start: 2023-11-20 | End: 2023-11-20

## 2023-11-20 RX ORDER — DEXAMETHASONE SODIUM PHOSPHATE 10 MG/ML
INJECTION, SOLUTION INTRAMUSCULAR; INTRAVENOUS AS NEEDED
Status: DISCONTINUED | OUTPATIENT
Start: 2023-11-20 | End: 2023-11-20

## 2023-11-20 RX ORDER — PROPOFOL 10 MG/ML
INJECTION, EMULSION INTRAVENOUS AS NEEDED
Status: DISCONTINUED | OUTPATIENT
Start: 2023-11-20 | End: 2023-11-20

## 2023-11-20 RX ORDER — HYDROMORPHONE HCL/PF 1 MG/ML
0.4 SYRINGE (ML) INJECTION
Status: DISCONTINUED | OUTPATIENT
Start: 2023-11-20 | End: 2023-11-20 | Stop reason: HOSPADM

## 2023-11-20 RX ORDER — MAGNESIUM HYDROXIDE 1200 MG/15ML
LIQUID ORAL AS NEEDED
Status: DISCONTINUED | OUTPATIENT
Start: 2023-11-20 | End: 2023-11-20 | Stop reason: HOSPADM

## 2023-11-20 RX ORDER — SODIUM CHLORIDE, SODIUM LACTATE, POTASSIUM CHLORIDE, CALCIUM CHLORIDE 600; 310; 30; 20 MG/100ML; MG/100ML; MG/100ML; MG/100ML
INJECTION, SOLUTION INTRAVENOUS CONTINUOUS PRN
Status: DISCONTINUED | OUTPATIENT
Start: 2023-11-20 | End: 2023-11-20

## 2023-11-20 RX ORDER — ONDANSETRON 2 MG/ML
INJECTION INTRAMUSCULAR; INTRAVENOUS AS NEEDED
Status: DISCONTINUED | OUTPATIENT
Start: 2023-11-20 | End: 2023-11-20

## 2023-11-20 RX ORDER — CEFTRIAXONE 1 G/50ML
1000 INJECTION, SOLUTION INTRAVENOUS ONCE
Status: COMPLETED | OUTPATIENT
Start: 2023-11-20 | End: 2023-11-20

## 2023-11-20 RX ORDER — FENTANYL CITRATE 50 UG/ML
INJECTION, SOLUTION INTRAMUSCULAR; INTRAVENOUS AS NEEDED
Status: DISCONTINUED | OUTPATIENT
Start: 2023-11-20 | End: 2023-11-20

## 2023-11-20 RX ORDER — KETOROLAC TROMETHAMINE 30 MG/ML
INJECTION, SOLUTION INTRAMUSCULAR; INTRAVENOUS AS NEEDED
Status: DISCONTINUED | OUTPATIENT
Start: 2023-11-20 | End: 2023-11-20

## 2023-11-20 RX ADMIN — KETOROLAC TROMETHAMINE 15 MG: 30 INJECTION, SOLUTION INTRAMUSCULAR at 09:17

## 2023-11-20 RX ADMIN — PROPOFOL 200 MG: 10 INJECTION, EMULSION INTRAVENOUS at 07:30

## 2023-11-20 RX ADMIN — FENTANYL CITRATE 50 MCG: 50 INJECTION, SOLUTION INTRAMUSCULAR; INTRAVENOUS at 08:13

## 2023-11-20 RX ADMIN — FENTANYL CITRATE 50 MCG: 50 INJECTION, SOLUTION INTRAMUSCULAR; INTRAVENOUS at 07:39

## 2023-11-20 RX ADMIN — DEXAMETHASONE SODIUM PHOSPHATE 10 MG: 10 INJECTION, SOLUTION INTRAMUSCULAR; INTRAVENOUS at 07:37

## 2023-11-20 RX ADMIN — CEFTRIAXONE 1000 MG: 1 INJECTION, SOLUTION INTRAVENOUS at 07:40

## 2023-11-20 RX ADMIN — FENTANYL CITRATE 100 MCG: 50 INJECTION, SOLUTION INTRAMUSCULAR; INTRAVENOUS at 08:59

## 2023-11-20 RX ADMIN — LIDOCAINE HYDROCHLORIDE 50 MG: 10 INJECTION, SOLUTION EPIDURAL; INFILTRATION; INTRACAUDAL; PERINEURAL at 07:30

## 2023-11-20 RX ADMIN — ONDANSETRON 4 MG: 2 INJECTION INTRAMUSCULAR; INTRAVENOUS at 09:17

## 2023-11-20 RX ADMIN — SODIUM CHLORIDE, SODIUM LACTATE, POTASSIUM CHLORIDE, AND CALCIUM CHLORIDE: .6; .31; .03; .02 INJECTION, SOLUTION INTRAVENOUS at 07:48

## 2023-11-20 NOTE — ANESTHESIA POSTPROCEDURE EVALUATION
Post-Op Assessment Note    CV Status:  Stable  Pain Score: 0    Pain management: adequate       Mental Status:  Alert   Hydration Status:  Euvolemic   PONV Controlled:  Controlled   Airway Patency:  Patent    No anethesia notable event occurred.     Staff: Anesthesiologist               BP      Temp     Pulse     Resp      SpO2

## 2023-11-20 NOTE — INTERVAL H&P NOTE
H&P reviewed. After examining the patient I find no changes in the patients condition since the H&P had been written.     Vitals:    11/20/23 0656   BP: 142/63   Pulse: 76   Resp: 22   Temp: (!) 97.1 °F (36.2 °C)   SpO2: 95%

## 2023-11-20 NOTE — DISCHARGE INSTR - AVS FIRST PAGE
Take cefpodoxime 200 mg twice a day starting tomorrow 11/21/2023. Restart aspirin and supplements tomorrow. Dr. Duane Egan office will call for appointment in 1 to 2 weeks.

## 2023-11-20 NOTE — ANESTHESIA PREPROCEDURE EVALUATION
Procedure:  CYSTOSCOPY; RETROGRADE; URETEROSCOPY; 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 (Left: Bladder)    Relevant Problems   CARDIO   (+) Essential hypertension      ENDO   (+) Type 2 diabetes mellitus with diabetic polyneuropathy (HCC)      GI/HEPATIC   (+) GERD without esophagitis      MUSCULOSKELETAL   (+) Degenerative lumbar disc      NEURO/PSYCH   (+) Anxiety and depression      PULMONARY   (+) COPD, severity to be determined [facility])        Physical Exam    Airway    Mallampati score: II  TM Distance: >3 FB  Neck ROM: full     Dental   No notable dental hx     Cardiovascular  Rhythm: regular, Rate: normal, Cardiovascular exam normal    Pulmonary  Pulmonary exam normal Breath sounds clear to auscultation    Other Findings  8/14/2023 ECHO:       ·  Left Ventricle: Left ventricular cavity size is normal. Wall thickness is mild-moderately increased. The left ventricular ejection fraction is 50-55%. Systolic function is low normal. Although no diagnostic regional wall motion abnormality was identified, this possibility cannot be completely excluded on the basis of this study. Diastolic function is mildly abnormal, consistent with grade I (abnormal) relaxation. ·  Left Atrium: The atrium is mildly dilated. ·  Mitral Valve: There is mild annular calcification. There is mild regurgitation. ·  Tricuspid Valve: There is mild to moderate regurgitation. ·  Pulmonic Valve: There is mild regurgitation. post-pubertal.      Anesthesia Plan  ASA Score- 2     Anesthesia Type- general with ASA Monitors. Additional Monitors:     Airway Plan: LMA. Plan Factors-Exercise tolerance (METS): >4 METS. Chart reviewed. EKG reviewed. Imaging results reviewed. Existing labs reviewed. Patient summary reviewed. Current smoker: Every Day - 50 pack years. Patient smoked on day of surgery. Obstructive sleep apnea risk education given perioperatively.         Induction- intravenous. Postoperative Plan- Plan for postoperative opioid use. Planned trial extubation    Informed Consent- Anesthetic plan and risks discussed with patient.

## 2023-11-20 NOTE — OP NOTE
OPERATIVE REPORT  PATIENT NAME: Larry Lang    :  1945  MRN: [id number]  Pt Location: [facility] OR ROOM 01    SURGERY DATE: 2023    Surgeon(s) and Role:     * Kimani Smith MD - Primary    Preop Diagnosis:  Malignant neoplasm of unspecified renal pelvis ([address]) [C65.9]    Post-Op Diagnosis Codes:     * Malignant neoplasm of unspecified renal pelvis ([address]) [C65.9]    Procedure(s):  Left - CYSTOSCOPY; RETROGRADE; URETEROSCOPY; 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    Specimen(s):  ID Type Source Tests Collected by Time Destination   A : UA C&S Urine Urinary Bladder URINE CULTURE, URINALYSIS WITH REFLEX TO SCOPE Kimani Smith MD 2023 [id number]        Estimated Blood Loss:   Minimal    Drains:  Ureteral Internal Stent Left ureter 6 Fr. (Active)   Number of days: 63       Ureteral Internal Stent Left ureter 6 Fr. (Active)   Number of days: 0       Anesthesia Type:   General    Operative Indications:  Malignant neoplasm of unspecified renal pelvis ([address]) [C65.9]  This is a 67-year-old female initially presenting with gross hematuria with CT scan showing a 1.4 cm filling defect in the left upper pole infundibulum consistent with tumor. 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  Cytologies were negative. Options, procedures, benefits and risks were discussed and she agreed to the planned procedure. Operative Findings:  Bladder and left ureter free of any filling defects or masses on retrograde urogram and ureteroscopy. Papillary urothelial tumor present circumferentially along the left upper pole infundibulum as described on the CT scan. There were multiple papillary projections but not particularly bulky.   The remainder of the right renal collecting system as well as the calyx above the tumor were smooth and free of any suspicious lesions. Complications:   None    Procedure and Technique:  The patient was properly identified in the operating room and after adequate general anesthesia was placed in the lithotomy position. The lower abdomen and genitalia were prepped and draped in the usual fashion. 21 Korean cystoscope was inserted into the bladder and urine was drained and sent for urinalysis and culture. Cystoscopy was performed with 30 degree and 70 degree lenses. The bladder was smooth with no masses. The ureteral orifices were normal bilaterally. A 5 Korean open-ended ureteral catheter was passed into the left ureteral orifice and contrast material was instilled under fluoroscopic guidance. The course and caliber of the ureter was normal with no dilation or filling defects. The collecting system was also normal except for filling defect in the upper pole infundibulum as previously described. A 0.038 Solo guidewire was passed through the catheter into the collecting system and the catheter was removed. Semirigid ureteroscopy was then performed throughout the entire left ureter up to the UPJ with no suspicious lesions seen. The ureteroscope was removed. A 12/14 Korean 35 cm ureteral access sheath was passed over the wire up to the proximal ureter. The inner sheath was removed. The wire was secured as a safety wire. A single use flexible ureteroscope was then passed through the sheath into the collecting system under direct vision. The wire was removed. Inspection of the collecting system was performed with findings as above. The tumor was then treated with the holmium laser with the soft tissues settings. Ablation was set at 0.8 J and 12 Hz and coagulation was set at 0.8 J and 8 Hz. A 272 µm fiber was utilized. All of the papillary tumor was then completely treated mostly with ablation setting and occasionally with coagulation setting. The tumor ablated relatively easily with very little bleeding.   The entire tumor was systematically treated from distal to proximal until all of the visible tumor was completely ablated. Upon completion, no visible viable tumor remained and hemostasis was achieved. The ureteroscope was removed. The inner sheath was placed into the outer sheath. Dilute contrast was instilled outlining a normal proximal ureter and filling out of the upper pole infundibulum. There was some extravasation medially at the level of the tumor. The wire was replaced through the sheath into the upper pole and the sheath was removed. The wire was backloaded onto the cystoscope. A 6 Indonesian 24 cm Bard inlay [place] stent was passed over the wire and the wire was removed leaving the stent within the collecting system below the level of the tumor and a good curl within the bladder. The bladder was emptied and the cystoscope was removed. The patient tolerated the procedure well and left the operating room in good condition. I was present for the entire procedure.     Patient Disposition:  PACU         SIGNATURE: Blanca Talbert MD  DATE: November 20, 2023  TIME: 9:31 AM

## 2023-11-22 LAB — BACTERIA UR CULT: NORMAL

## 2023-11-28 ENCOUNTER — RA CDI HCC (OUTPATIENT)
Dept: OTHER | Facility: HOSPITAL | Age: 78
End: 2023-11-28

## 2023-11-28 ENCOUNTER — OFFICE VISIT (OUTPATIENT)
Dept: FAMILY MEDICINE CLINIC | Facility: CLINIC | Age: 78
End: 2023-11-28
Payer: COMMERCIAL

## 2023-11-28 VITALS
WEIGHT: 155 LBS | OXYGEN SATURATION: 95 % | TEMPERATURE: 98.3 F | HEART RATE: 74 BPM | HEIGHT: 61 IN | DIASTOLIC BLOOD PRESSURE: 70 MMHG | RESPIRATION RATE: 18 BRPM | SYSTOLIC BLOOD PRESSURE: 140 MMHG | BODY MASS INDEX: 29.27 KG/M2

## 2023-11-28 DIAGNOSIS — J44.9 COPD, SEVERITY TO BE DETERMINED (HCC): ICD-10-CM

## 2023-11-28 DIAGNOSIS — M81.0 AGE-RELATED OSTEOPOROSIS WITHOUT CURRENT PATHOLOGICAL FRACTURE: ICD-10-CM

## 2023-11-28 DIAGNOSIS — E11.42 TYPE 2 DIABETES MELLITUS WITH DIABETIC POLYNEUROPATHY, WITHOUT LONG-TERM CURRENT USE OF INSULIN (HCC): Chronic | ICD-10-CM

## 2023-11-28 DIAGNOSIS — K21.9 GERD WITHOUT ESOPHAGITIS: Chronic | ICD-10-CM

## 2023-11-28 DIAGNOSIS — N39.46 MIXED STRESS AND URGE URINARY INCONTINENCE: ICD-10-CM

## 2023-11-28 DIAGNOSIS — I10 ESSENTIAL HYPERTENSION: ICD-10-CM

## 2023-11-28 DIAGNOSIS — F41.9 ANXIETY AND DEPRESSION: Primary | ICD-10-CM

## 2023-11-28 DIAGNOSIS — F32.A ANXIETY AND DEPRESSION: Primary | ICD-10-CM

## 2023-11-28 DIAGNOSIS — Z23 ENCOUNTER FOR IMMUNIZATION: Primary | ICD-10-CM

## 2023-11-28 DIAGNOSIS — R31.9 HEMATURIA, UNSPECIFIED TYPE: ICD-10-CM

## 2023-11-28 PROCEDURE — 90677 PCV20 VACCINE IM: CPT | Performed by: FAMILY MEDICINE

## 2023-11-28 PROCEDURE — 99214 OFFICE O/P EST MOD 30 MIN: CPT | Performed by: FAMILY MEDICINE

## 2023-11-28 PROCEDURE — G0009 ADMIN PNEUMOCOCCAL VACCINE: HCPCS | Performed by: FAMILY MEDICINE

## 2023-11-28 RX ORDER — POTASSIUM CHLORIDE 750 MG/1
10 TABLET, EXTENDED RELEASE ORAL 2 TIMES DAILY
Qty: 180 TABLET | Refills: 0 | Status: SHIPPED | OUTPATIENT
Start: 2023-11-28

## 2023-11-28 RX ORDER — SOLIFENACIN SUCCINATE 5 MG/1
5 TABLET, FILM COATED ORAL DAILY
Qty: 90 TABLET | Refills: 0 | Status: SHIPPED | OUTPATIENT
Start: 2023-11-28

## 2023-11-28 RX ORDER — BUPROPION HYDROCHLORIDE 300 MG/1
300 TABLET ORAL DAILY
Qty: 90 TABLET | Refills: 0 | Status: SHIPPED | OUTPATIENT
Start: 2023-11-28

## 2023-11-28 NOTE — ASSESSMENT & PLAN NOTE
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 Devyn Francis she will follow-up with him next week

## 2023-11-28 NOTE — PROGRESS NOTES
Assessment/Plan:       Problem List Items Addressed This Visit          Digestive    GERD without esophagitis (Chronic)     Stable continue same medication regimen pantoprazole 40 mg daily            Endocrine    Type 2 diabetes mellitus with diabetic polyneuropathy (HCC) (Chronic)     Patient's diabetes is under stable control at this time A1c is 6.5 continuing with current treatment plan she is doing well continue same medication regimen avoiding concentrated sweets repeat laboratory work at next visit in 4 months  Lab Results   Component Value Date    HGBA1C 6.5 (H) 11/07/2023          Relevant Orders    Albumin / creatinine urine ratio    Comprehensive metabolic panel    Hemoglobin A1C    Lipid Panel with Direct LDL reflex    TSH, 3rd generation with Free T4 reflex       Respiratory    COPD, severity to be determined ([address])     COPD is stable doing well right now no shortness of breath continue Advair as needed            Cardiovascular and Mediastinum    Essential hypertension     Hypertension stable control continue same medications no change at this point follow-up in 4 months            Musculoskeletal and Integument    Age-related osteoporosis without current pathological fracture     Patient will continue with calcium vitamin D and she is interested in starting Prolia injections            Other    Hematuria     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 Hernando Granados she will follow-up with him next week          Other Visit Diagnoses       Encounter for immunization    -  Primary    Relevant Orders    Pneumococcal Conjugate Vaccine 20-valent (Pcv20) (Completed)              Subjective:      Patient ID: Ariadne Christina is a 69 y.o. female.     Follow up evaluation and review bone density test lab work and recent cystoscopy with laser ablation of neoplasm also urinary frequency with stent and urology workup        The following portions of the patient's history were reviewed and updated as appropriate: allergies, current medications, past family history, past medical history, past social history, past surgical history and problem list.    Review of Systems   Constitutional:  Negative for chills, fatigue and fever. HENT:  Negative for congestion, nosebleeds, rhinorrhea, sinus pressure and sore throat. Eyes:  Negative for discharge and redness. Respiratory:  Negative for cough and shortness of breath. Cardiovascular:  Negative for chest pain, palpitations and leg swelling. Gastrointestinal:  Negative for abdominal pain, blood in stool and nausea. Endocrine: Negative for cold intolerance, heat intolerance and polyuria. Genitourinary:  Negative for dysuria and frequency. Musculoskeletal:  Negative for arthralgias, back pain and myalgias. Skin:  Negative for rash. Neurological:  Negative for dizziness, weakness and headaches. Hematological:  Negative for adenopathy. Psychiatric/Behavioral:  Negative for behavioral problems and sleep disturbance. The patient is not nervous/anxious. Objective:      /70 (BP Location: Left arm, Patient Position: Sitting, Cuff Size: Standard)   Pulse 74   Temp 98.3 °F (36.8 °C) (Temporal)   Resp 18   Ht 5' 1" (1.549 m)   Wt 70.3 kg (155 lb)   SpO2 95%   BMI 29.29 kg/m²        Physical Exam  Vitals and nursing note reviewed. Constitutional:       General: She is not in acute distress. Appearance: Normal appearance. She is well-developed. HENT:      Head: Normocephalic and atraumatic. Right Ear: Tympanic membrane, ear canal and external ear normal.      Left Ear: Tympanic membrane, ear canal and external ear normal.      Nose: Nose normal.      Mouth/Throat:      Mouth: Mucous membranes are moist.      Pharynx: Oropharynx is clear. No oropharyngeal exudate. Eyes:      General: No scleral icterus. Right eye: No discharge. Left eye: No discharge.       Conjunctiva/sclera: Conjunctivae normal.      Pupils: Pupils are equal, round, and reactive to light. Neck:      Thyroid: No thyromegaly. Vascular: No JVD. Cardiovascular:      Rate and Rhythm: Normal rate and regular rhythm. Heart sounds: Normal heart sounds. No murmur heard. Pulmonary:      Effort: Pulmonary effort is normal.      Breath sounds: No wheezing or rales. Chest:      Chest wall: No tenderness. Abdominal:      General: Bowel sounds are normal. There is no distension. Palpations: Abdomen is soft. There is no mass. Tenderness: There is no abdominal tenderness. Musculoskeletal:         General: No tenderness or deformity. Normal range of motion. Cervical back: Normal range of motion. Lymphadenopathy:      Cervical: No cervical adenopathy. Skin:     General: Skin is warm and dry. Findings: No rash. Neurological:      General: No focal deficit present. Mental Status: She is alert and oriented to person, place, and time. Cranial Nerves: No cranial nerve deficit. Coordination: Coordination normal.      Deep Tendon Reflexes: Reflexes are normal and symmetric. Reflexes normal.   Psychiatric:         Mood and Affect: Mood normal.         Behavior: Behavior normal.         Thought Content: Thought content normal.         Judgment: Judgment normal.          Data:    Laboratory Results: I have personally reviewed the pertinent laboratory results/reports   Radiology/Other Diagnostic Testing Results: I have personally reviewed pertinent reports.        Lab Results   Component Value Date    WBC 9.78 11/07/2023    HGB 15.2 11/07/2023    HCT 46.0 11/07/2023    MCV 98 11/07/2023     11/07/2023     Lab Results   Component Value Date     05/21/2018    K 4.9 11/07/2023     11/07/2023    CO2 28 11/07/2023    ANIONGAP 14.0 05/21/2018    BUN 8 11/07/2023    CREATININE 0.49 (L) 11/07/2023    GLUF 134 (H) 11/07/2023    CALCIUM 10.0 11/07/2023    CORRECTEDCA 10.3 (H) 08/14/2023    AST 14 11/07/2023    ALT 6 (L) 11/07/2023    ALKPHOS 50 11/07/2023    PROT 6.4 05/21/2018    BILITOT 0.4 05/21/2018    EGFR 94 11/07/2023     Lab Results   Component Value Date    CHOLESTEROL 167 11/07/2023    CHOLESTEROL 138 07/05/2023    CHOLESTEROL 172 12/06/2022     Lab Results   Component Value Date    HDL 48 (L) 11/07/2023    HDL 40 (L) 07/05/2023    HDL 48 (L) 12/06/2022     Lab Results   Component Value Date    LDLCALC 80 11/07/2023    LDLCALC 48 07/05/2023    LDLCALC 84 12/06/2022     Lab Results   Component Value Date    TRIG 193 (H) 11/07/2023    TRIG 248 (H) 07/05/2023    TRIG 201 (H) 12/06/2022     No results found for: "[place], [place]"  Lab Results   Component Value Date    JOG3TREQHCIX 2.875 11/07/2023     Lab Results   Component Value Date    HGBA1C 6.5 (H) 11/07/2023     No results found for: "PSA"    Roldan Bledsoe, DO

## 2023-11-28 NOTE — ASSESSMENT & PLAN NOTE
Patient's diabetes is under stable control at this time A1c is 6.5 continuing with current treatment plan she is doing well continue same medication regimen avoiding concentrated sweets repeat laboratory work at next visit in 4 months  Lab Results   Component Value Date    HGBA1C 6.5 (H) 11/07/2023

## 2023-11-28 NOTE — PROGRESS NOTES
[facility] coding opportunities       Chart reviewed, no opportunity found: CHART REVIEWED, [address]     Patients Insurance     Medicare Insurance: [employer]

## 2023-12-08 DIAGNOSIS — E11.42 TYPE 2 DIABETES MELLITUS WITH DIABETIC POLYNEUROPATHY, WITHOUT LONG-TERM CURRENT USE OF INSULIN (HCC): ICD-10-CM

## 2023-12-08 RX ORDER — LANCETS 33 GAUGE
EACH MISCELLANEOUS
Qty: 100 EACH | Refills: 5 | Status: SHIPPED | OUTPATIENT
Start: 2023-12-08

## 2023-12-08 RX ORDER — BLOOD SUGAR DIAGNOSTIC
STRIP MISCELLANEOUS
Qty: 100 STRIP | Refills: 5 | Status: SHIPPED | OUTPATIENT
Start: 2023-12-08

## 2023-12-14 ENCOUNTER — OFFICE VISIT (OUTPATIENT)
Dept: FAMILY MEDICINE CLINIC | Facility: CLINIC | Age: 78
End: 2023-12-14
Payer: COMMERCIAL

## 2023-12-14 VITALS
DIASTOLIC BLOOD PRESSURE: 80 MMHG | HEIGHT: 61 IN | TEMPERATURE: 97.6 F | BODY MASS INDEX: 28.81 KG/M2 | WEIGHT: 152.6 LBS | OXYGEN SATURATION: 99 % | HEART RATE: 87 BPM | SYSTOLIC BLOOD PRESSURE: 122 MMHG

## 2023-12-14 DIAGNOSIS — I73.9 PVD (PERIPHERAL VASCULAR DISEASE) (HCC): ICD-10-CM

## 2023-12-14 DIAGNOSIS — I10 ESSENTIAL HYPERTENSION: ICD-10-CM

## 2023-12-14 DIAGNOSIS — M81.0 AGE-RELATED OSTEOPOROSIS WITHOUT CURRENT PATHOLOGICAL FRACTURE: ICD-10-CM

## 2023-12-14 DIAGNOSIS — L97.312 ANKLE ULCER, RIGHT, WITH FAT LAYER EXPOSED (HCC): Primary | ICD-10-CM

## 2023-12-14 DIAGNOSIS — E11.42 TYPE 2 DIABETES MELLITUS WITH DIABETIC POLYNEUROPATHY, WITHOUT LONG-TERM CURRENT USE OF INSULIN (HCC): Chronic | ICD-10-CM

## 2023-12-14 PROCEDURE — 99214 OFFICE O/P EST MOD 30 MIN: CPT | Performed by: FAMILY MEDICINE

## 2023-12-14 RX ORDER — LEVOFLOXACIN 500 MG/1
500 TABLET, FILM COATED ORAL EVERY 24 HOURS
Qty: 7 TABLET | Refills: 1 | Status: SHIPPED | OUTPATIENT
Start: 2023-12-14 | End: 2023-12-21

## 2023-12-14 NOTE — ASSESSMENT & PLAN NOTE
Refer to wound care at this point. Area was dressed and changed and rewrapped today. She requested Levaquin antibiotic as this helped her in the past on examination does not appear to be actively infected there is no surrounding erythema or cellulitis.   We will prescribe her the antibiotic now as a preventative and send her over for wound care follow-up

## 2023-12-14 NOTE — ASSESSMENT & PLAN NOTE
Blood sugar stable last A1c 6.5 continue same medication regimen monitor blood glucose accurately and avoid concentrated sweets carbohydrates or other foods that would elevate blood sugar during this active ulceration process to help with healing  Lab Results   Component Value Date    HGBA1C 6.5 (H) 11/07/2023

## 2023-12-14 NOTE — ASSESSMENT & PLAN NOTE
Monitor blood pressure closely as worsening hypertension can decrease blood flow to the extremity and slow healing process as described and explained with patient today we will follow-up closely.   Refer to wound care now

## 2023-12-14 NOTE — ASSESSMENT & PLAN NOTE
Peripheral vascular disease some in the process of healing for right ankle ulcer which previously occurred years ago and was healed for a long time but recently reopened and has worsened.   I will send her for wound care follow-up now

## 2023-12-14 NOTE — PROGRESS NOTES
Assessment/Plan:       Problem List Items Addressed This Visit          Endocrine    Type 2 diabetes mellitus with diabetic polyneuropathy (HCC) (Chronic)     Blood sugar stable last A1c 6.5 continue same medication regimen monitor blood glucose accurately and avoid concentrated sweets carbohydrates or other foods that would elevate blood sugar during this active ulceration process to help with healing  Lab Results   Component Value Date    HGBA1C 6.5 (H) 11/07/2023               Cardiovascular and Mediastinum    Essential hypertension     Monitor blood pressure closely as worsening hypertension can decrease blood flow to the extremity and slow healing process as described and explained with patient today we will follow-up closely. Refer to wound care now         PVD (peripheral vascular disease) ([address])     Peripheral vascular disease some in the process of healing for right ankle ulcer which previously occurred years ago and was healed for a long time but recently reopened and has worsened. I will send her for wound care follow-up now            Musculoskeletal and Integument    Age-related osteoporosis without current pathological fracture    Relevant Orders    DXA bone density spine hip and pelvis    Ankle ulcer, right, with fat layer exposed ([address]) - Primary     Refer to wound care at this point. Area was dressed and changed and rewrapped today. She requested Levaquin antibiotic as this helped her in the past on examination does not appear to be actively infected there is no surrounding erythema or cellulitis. We will prescribe her the antibiotic now as a preventative and send her over for wound care follow-up         Relevant Medications    levofloxacin (LEVAQUIN) 500 mg tablet    Other Relevant Orders    Ambulatory Referral to Wound Care         Subjective:      Patient ID: Dionisio Carrillo is a 69 y.o. female.     Ankle Pain         The following portions of the patient's history were reviewed and updated as appropriate: allergies, current medications, past family history, past medical history, past social history, past surgical history and problem list.    Review of Systems   Constitutional:  Negative for chills, fatigue and fever. HENT:  Negative for congestion, nosebleeds, rhinorrhea, sinus pressure and sore throat. Eyes:  Negative for discharge and redness. Respiratory:  Negative for cough and shortness of breath. Cardiovascular:  Negative for chest pain, palpitations and leg swelling. Gastrointestinal:  Negative for abdominal pain, blood in stool and nausea. Endocrine: Negative for cold intolerance, heat intolerance and polyuria. Genitourinary:  Negative for dysuria and frequency. Musculoskeletal:  Negative for arthralgias, back pain and myalgias. Skin:  Negative for rash. Neurological:  Negative for dizziness, weakness and headaches. Hematological:  Negative for adenopathy. Psychiatric/Behavioral:  Negative for behavioral problems and sleep disturbance. The patient is not nervous/anxious. Objective:      /80 (BP Location: Left arm, Patient Position: Sitting)   Pulse 87   Temp 97.6 °F (36.4 °C)   Ht 5' 1" (1.549 m)   Wt 69.2 kg (152 lb 9.6 oz)   SpO2 99%   BMI 28.83 kg/m²        Physical Exam  Vitals and nursing note reviewed. Constitutional:       General: She is not in acute distress. Appearance: She is well-developed. HENT:      Head: Normocephalic and atraumatic. Right Ear: External ear normal.      Left Ear: External ear normal.      Nose: Nose normal.      Mouth/Throat:      Mouth: Mucous membranes are moist.      Pharynx: Oropharynx is clear. No oropharyngeal exudate. Eyes:      General: No scleral icterus. Right eye: No discharge. Left eye: No discharge. Conjunctiva/sclera: Conjunctivae normal.      Pupils: Pupils are equal, round, and reactive to light. Neck:      Thyroid: No thyromegaly. Vascular: No JVD. Cardiovascular:      Rate and Rhythm: Normal rate and regular rhythm. Heart sounds: Normal heart sounds. No murmur heard. Pulmonary:      Effort: Pulmonary effort is normal.      Breath sounds: No wheezing or rales. Chest:      Chest wall: No tenderness. Abdominal:      General: Bowel sounds are normal. There is no distension. Palpations: Abdomen is soft. There is no mass. Tenderness: There is no abdominal tenderness. Musculoskeletal:         General: No tenderness or deformity. Normal range of motion. Cervical back: Normal range of motion. Lymphadenopathy:      Cervical: No cervical adenopathy. Skin:     General: Skin is warm and dry. Findings: No rash. Comments: Right ankle ulcer   Neurological:      General: No focal deficit present. Mental Status: She is alert and oriented to person, place, and time. Cranial Nerves: No cranial nerve deficit. Coordination: Coordination normal.      Deep Tendon Reflexes: Reflexes are normal and symmetric. Reflexes normal.   Psychiatric:         Mood and Affect: Mood normal.         Behavior: Behavior normal.         Thought Content: Thought content normal.         Judgment: Judgment normal.          Data:    Laboratory Results: I have personally reviewed the pertinent laboratory results/reports   Radiology/Other Diagnostic Testing Results: I have personally reviewed pertinent reports.        Lab Results   Component Value Date    WBC 9.78 11/07/2023    HGB 15.2 11/07/2023    HCT 46.0 11/07/2023    MCV 98 11/07/2023     11/07/2023     Lab Results   Component Value Date     05/21/2018    K 4.9 11/07/2023     11/07/2023    CO2 28 11/07/2023    ANIONGAP 14.0 05/21/2018    BUN 8 11/07/2023    CREATININE 0.49 (L) 11/07/2023    GLUF 134 (H) 11/07/2023    CALCIUM 10.0 11/07/2023    CORRECTEDCA 10.3 (H) 08/14/2023    AST 14 11/07/2023    ALT 6 (L) 11/07/2023    ALKPHOS 50 11/07/2023    PROT 6.4 05/21/2018    BILITOT 0.4 05/21/2018    EGFR 94 11/07/2023     Lab Results   Component Value Date    CHOLESTEROL 167 11/07/2023    CHOLESTEROL 138 07/05/2023    CHOLESTEROL 172 12/06/2022     Lab Results   Component Value Date    HDL 48 (L) 11/07/2023    HDL 40 (L) 07/05/2023    HDL 48 (L) 12/06/2022     Lab Results   Component Value Date    LDLCALC 80 11/07/2023    LDLCALC 48 07/05/2023    LDLCALC 84 12/06/2022     Lab Results   Component Value Date    TRIG 193 (H) 11/07/2023    TRIG 248 (H) 07/05/2023    TRIG 201 (H) 12/06/2022     No results found for: "[place], [place]"  Lab Results   Component Value Date    [id number] 2.875 11/07/2023     Lab Results   Component Value Date    HGBA1C 6.5 (H) 11/07/2023     No results found for: "PSA"    Kandy Dean, DO

## 2024-01-09 ENCOUNTER — OFFICE VISIT (OUTPATIENT)
Dept: WOUND CARE | Facility: CLINIC | Age: 79
End: 2024-01-09
Payer: COMMERCIAL

## 2024-01-09 VITALS
SYSTOLIC BLOOD PRESSURE: 127 MMHG | TEMPERATURE: 97.5 F | DIASTOLIC BLOOD PRESSURE: 62 MMHG | WEIGHT: 152 LBS | HEART RATE: 86 BPM | RESPIRATION RATE: 20 BRPM | HEIGHT: 61 IN | BODY MASS INDEX: 28.7 KG/M2

## 2024-01-09 DIAGNOSIS — E11.621 DIABETIC ULCER OF TOE OF RIGHT FOOT ASSOCIATED WITH TYPE 2 DIABETES MELLITUS, WITH BONE INVOLVEMENT WITHOUT EVIDENCE OF NECROSIS (HCC): Primary | ICD-10-CM

## 2024-01-09 DIAGNOSIS — I73.9 PAD (PERIPHERAL ARTERY DISEASE) (HCC): ICD-10-CM

## 2024-01-09 DIAGNOSIS — L98.499 ARTERIAL INSUFFICIENCY WITH ISCHEMIC ULCER (HCC): ICD-10-CM

## 2024-01-09 DIAGNOSIS — I77.1 ARTERIAL INSUFFICIENCY WITH ISCHEMIC ULCER (HCC): ICD-10-CM

## 2024-01-09 DIAGNOSIS — L97.516 DIABETIC ULCER OF TOE OF RIGHT FOOT ASSOCIATED WITH TYPE 2 DIABETES MELLITUS, WITH BONE INVOLVEMENT WITHOUT EVIDENCE OF NECROSIS (HCC): Primary | ICD-10-CM

## 2024-01-09 PROCEDURE — 11042 DBRDMT SUBQ TIS 1ST 20SQCM/<: CPT | Performed by: STUDENT IN AN ORGANIZED HEALTH CARE EDUCATION/TRAINING PROGRAM

## 2024-01-09 PROCEDURE — 99204 OFFICE O/P NEW MOD 45 MIN: CPT | Performed by: STUDENT IN AN ORGANIZED HEALTH CARE EDUCATION/TRAINING PROGRAM

## 2024-01-09 PROCEDURE — 97597 DBRDMT OPN WND 1ST 20 CM/<: CPT | Performed by: STUDENT IN AN ORGANIZED HEALTH CARE EDUCATION/TRAINING PROGRAM

## 2024-01-09 PROCEDURE — 99215 OFFICE O/P EST HI 40 MIN: CPT | Performed by: STUDENT IN AN ORGANIZED HEALTH CARE EDUCATION/TRAINING PROGRAM

## 2024-01-09 PROCEDURE — 11045 DBRDMT SUBQ TISS EACH ADDL: CPT | Performed by: STUDENT IN AN ORGANIZED HEALTH CARE EDUCATION/TRAINING PROGRAM

## 2024-01-09 RX ORDER — LIDOCAINE 40 MG/G
CREAM TOPICAL ONCE
Status: COMPLETED | OUTPATIENT
Start: 2024-01-09 | End: 2024-01-09

## 2024-01-09 RX ADMIN — LIDOCAINE: 40 CREAM TOPICAL at 15:52

## 2024-01-09 NOTE — PATIENT INSTRUCTIONS
Orders Placed This Encounter   Procedures    Wound cleansing and dressings     Wash your hands with soap and water.  Remove old dressing, discard into plastic bag and place in trash.  Cleanse the wound with unscented soap and water prior to applying a clean dressing. Do not use tissue or cotton balls. Do not scrub the wound. Pat dry using gauze.  Shower no Do not get dressing wet, do not soak foot in any water    Right ankle medial, lateral, and posterior wounds   Apply Mupirocin to all wounds.  Cover with Adaptic.  Cover with ABD pads.   Secure with Alicia and tape   Change dressing 3 times per week     Left ankle lateral wound   Apply Mupirocin to all wounds.  Cover with Adaptic.  Cover with gauze.   Secure with Alicia and tape   Change dressing 3 times per week    Right 2nd toe wound   Apply Acticoat 3 to toe wound, cover with gauze.   Secure with tape  Change dressing 3 times per week    Please try to keep Blood Sugars <120    Increase protein in your diet with each meal (eggs, nuts, protein shakes, meat, chicken, greek yogurt)        prescription for Mupirocin from Pharmacy    If you have any Signs or Symptoms of infection (fever, redness, increased pain or drainage or odor) please go to emergency room to be evaluated - [facility]     Obtain vascular study as ordered as soon as possible   Obtain X-Rays as ordered prior to next visit   MRI foot and ankle as ordered     [facility] ordered for you today     Follow up in 1 week     Standing Status:   Future     Standing Expiration Date:   1/9/2025    XR foot 3+ vw right     Right 2nd toe ulcer probes to bone     Standing Status:   Future     Standing Expiration Date:   1/9/2028     Scheduling Instructions:      Bring along any outside films relating to this procedure.          MRI ankle/heel right wo contrast     Standing Status:   Future     Standing Expiration Date:   1/9/2028     Scheduling Instructions:      There is no preparation  for this test. Please leave your jewelry and valuables at home, wedding rings are the exception. All patients will be required to change into a hospital gown and pants.  Street clothes are not permitted in the MRI.  Magnetic nail polish must be removed prior to arrival for your test. Please bring your insurance cards, a form of photo ID and a list of your medications with you. Arrive 15 minutes prior to your appointment time in order to register. Please bring any prior CT or MRI studies of this area that were not performed at a [facility].            To schedule this appointment, please contact Central Scheduling at [phone].            Prior to your appointment, please make sure you complete the MRI Screening Form when you e-Check in for your appointment. This will be available starting 7 days before your appointment in [employer]. You may receive an e-mail with an activation code if you do not have a [employer] account. If you do not have access to a device, we will complete your screening at your appointment.     Order Specific Question:   What is the patient's sedation requirement? If Medication for Claustrophobia is selected, order medication at this point.     Answer:   No Sedation     Order Specific Question:   Does the patient have metallic implants?     Answer:   No     Order Specific Question:   Does this procedure require the 3T MRI at [place] or [place]?     Answer:   No     Order Specific Question:   Release to patient through [employer]     Answer:   Immediate     Order Specific Question:   Is order priority selected as STAT?     Answer:   No     Order Specific Question:   Reason for Exam (FREE TEXT)     Answer:   Right posterior heel ulcer with exposed Achilles tendon, medial ankle ulcer, lateral ankle ulcer. Suspect OM given chronic wound     Order Specific Question:   When should the test be performed?     Answer:   in 1 week    MRI foot/forefoot toes right wo contrast     Standing Status:   Future      Standing Expiration Date:   1/9/2028     Scheduling Instructions:      There is no preparation for this test. Please leave your jewelry and valuables at home, wedding rings are the exception. All patients will be required to change into a hospital gown and pants.  Street clothes are not permitted in the MRI.  Magnetic nail polish must be removed prior to arrival for your test. Please bring your insurance cards, a form of photo ID and a list of your medications with you. Arrive 15 minutes prior to your appointment time in order to register. Please bring any prior CT or MRI studies of this area that were not performed at a [facility].            To schedule this appointment, please contact Central Scheduling at [phone].            Prior to your appointment, please make sure you complete the MRI Screening Form when you e-Check in for your appointment. This will be available starting 7 days before your appointment in [employer]. You may receive an e-mail with an activation code if you do not have a [employer] account. If you do not have access to a device, we will complete your screening at your appointment.     Order Specific Question:   What is the patient's sedation requirement? If Medication for Claustrophobia is selected, order medication at this point.     Answer:   No Sedation     Order Specific Question:   Does the patient have metallic implants?     Answer:   No     Order Specific Question:   Does this procedure require the 3T MRI at [place] or [place]?     Answer:   No     Order Specific Question:   Release to patient through [employer]     Answer:   Immediate     Order Specific Question:   Is order priority selected as STAT?     Answer:   No     Order Specific Question:   Reason for Exam (FREE TEXT)     Answer:   RIght 2nd toe ulcer, OM

## 2024-01-10 ENCOUNTER — HOME HEALTH ADMISSION (OUTPATIENT)
Dept: HOME HEALTH SERVICES | Facility: HOME HEALTHCARE | Age: 79
End: 2024-01-10

## 2024-01-10 NOTE — PROGRESS NOTES
"Debridement   Wound 01/09/24 Arterial Ulcer Ankle Medial;Right    Universal Protocol:  Consent: Verbal consent obtained.  Risks and benefits: risks, benefits and alternatives were discussed  Consent given by: patient  Time out: Immediately prior to procedure a \"time out\" was called to verify the correct patient, procedure, equipment, support staff and site/side marked as required.  Patient understanding: patient states understanding of the procedure being performed  Patient identity confirmed: verbally with patient    Debridement Details  Performed by: physician  Debridement type: selective  Pain control: lidocaine 4%      Post-debridement measurements  Length (cm): 2.4  Width (cm): 3.8  Depth (cm): 0.2  Percent debrided: 100%  Surface Area (cm^2): 9.12  Area Debrided (cm^2): 9.12  Volume (cm^3): 1.82    Devitalized tissue debrided: biofilm, fibrin, necrotic debris and slough  Instrument(s) utilized: blade  Bleeding: none  Hemostasis obtained with: not applicable  Procedural pain (0-10): insensate  Post-procedural pain: insensate   Response to treatment: procedure was tolerated well        "

## 2024-01-10 NOTE — PROGRESS NOTES
"Debridement   Wound 01/09/24 Arterial Ulcer Ankle Right;Posterior    Universal Protocol:  Consent: Verbal consent obtained.  Risks and benefits: risks, benefits and alternatives were discussed  Consent given by: patient  Time out: Immediately prior to procedure a \"time out\" was called to verify the correct patient, procedure, equipment, support staff and site/side marked as required.  Patient understanding: patient states understanding of the procedure being performed  Patient identity confirmed: verbally with patient    Debridement Details  Performed by: physician  Debridement type: selective  Pain control: lidocaine 4%      Post-debridement measurements  Length (cm): 4  Width (cm): 2  Depth (cm): 0.3  Percent debrided: 100%  Surface Area (cm^2): 8  Area Debrided (cm^2): 8  Volume (cm^3): 2.4    Devitalized tissue debrided: biofilm, necrotic debris and slough  Instrument(s) utilized: blade and forceps  Bleeding: none  Hemostasis obtained with: not applicable  Procedural pain (0-10): insensate  Post-procedural pain: insensate   Response to treatment: procedure was tolerated well        "

## 2024-01-10 NOTE — PROGRESS NOTES
"Debridement   Wound 01/09/24 Arterial Ulcer Ankle Lateral;Right    Universal Protocol:  Consent: Verbal consent obtained.  Risks and benefits: risks, benefits and alternatives were discussed  Consent given by: patient  Time out: Immediately prior to procedure a \"time out\" was called to verify the correct patient, procedure, equipment, support staff and site/side marked as required.  Patient understanding: patient states understanding of the procedure being performed  Patient identity confirmed: verbally with patient    Debridement Details  Performed by: physician  Debridement type: selective  Pain control: lidocaine 4%      Post-debridement measurements  Length (cm): 0.7  Width (cm): 0.4  Depth (cm): 0.1  Percent debrided: 100%  Surface Area (cm^2): 0.28  Area Debrided (cm^2): 0.28  Volume (cm^3): 0.03    Devitalized tissue debrided: fibrin, necrotic debris and slough  Instrument(s) utilized: curette  Bleeding: none  Hemostasis obtained with: not applicable  Procedural pain (0-10): insensate  Post-procedural pain: insensate   Response to treatment: procedure was tolerated well        "

## 2024-01-10 NOTE — PROGRESS NOTES
"Debridement   Wound 01/09/24 Arterial Ulcer Ankle Left;Lateral    Universal Protocol:  Consent: Verbal consent obtained.  Risks and benefits: risks, benefits and alternatives were discussed  Consent given by: patient  Time out: Immediately prior to procedure a \"time out\" was called to verify the correct patient, procedure, equipment, support staff and site/side marked as required.  Patient understanding: patient states understanding of the procedure being performed  Patient identity confirmed: verbally with patient    Debridement Details  Performed by: physician  Debridement type: selective  Pain control: lidocaine 4%      Post-debridement measurements  Length (cm): 0.5  Width (cm): 0.3  Depth (cm): 0.1  Percent debrided: 100%  Surface Area (cm^2): 0.15  Area Debrided (cm^2): 0.15  Volume (cm^3): 0.02    Devitalized tissue debrided: callus, necrotic debris and slough  Instrument(s) utilized: curette  Bleeding: none  Hemostasis obtained with: not applicable  Procedural pain (0-10): insensate  Post-procedural pain: insensate   Response to treatment: procedure was tolerated well        "

## 2024-01-10 NOTE — PROGRESS NOTES
Patient ID: Lona Cedeno is a 78 y.o. female Date of Birth 1945     Diagnosis:  1. Diabetic ulcer of toe of right foot associated with type 2 diabetes mellitus, with bone involvement without evidence of necrosis (HCC)  -     lidocaine (LMX) 4 % cream  -     XR foot 3+ vw right; Future; Expected date: 01/09/2024  -     MRI ankle/heel right wo contrast; Future; Expected date: 01/09/2024  -     MRI foot/forefoot toes right wo contrast; Future; Expected date: 01/09/2024  -     VAS ARTERIAL DUPLEX- LOWER LIMB BILATERAL; Future; Expected date: 01/09/2024  -     Wound cleansing and dressings; Future  -     Referral to [facility]; Future  -     Debridement    2. Arterial insufficiency with ischemic ulcer (HCC)  -     lidocaine (LMX) 4 % cream  -     XR foot 3+ vw right; Future; Expected date: 01/09/2024  -     MRI ankle/heel right wo contrast; Future; Expected date: 01/09/2024  -     MRI foot/forefoot toes right wo contrast; Future; Expected date: 01/09/2024  -     VAS ARTERIAL DUPLEX- LOWER LIMB BILATERAL; Future; Expected date: 01/09/2024  -     Wound cleansing and dressings; Future  -     Referral to [facility]; Future    3. PAD (peripheral artery disease) (HCC)  -     XR foot 3+ vw right; Future; Expected date: 01/09/2024  -     MRI ankle/heel right wo contrast; Future; Expected date: 01/09/2024  -     MRI foot/forefoot toes right wo contrast; Future; Expected date: 01/09/2024  -     VAS ARTERIAL DUPLEX- LOWER LIMB BILATERAL; Future; Expected date: 01/09/2024  -     Referral to [facility]; Future        Diagnosis ICD-10-CM Associated Orders   1. Diabetic ulcer of toe of right foot associated with type 2 diabetes mellitus, with bone involvement without evidence of necrosis (HCC)  E11.621 lidocaine (LMX) 4 % cream    L97.516 XR foot 3+ vw right     MRI ankle/heel right wo contrast     MRI foot/forefoot toes right wo contrast     VAS ARTERIAL DUPLEX- LOWER LIMB  BILATERAL     Wound cleansing and dressings     Referral to [facility]     Debridement      2. Arterial insufficiency with ischemic ulcer ([facility])  I77.1 lidocaine (LMX) 4 % cream    L98.499 XR foot 3+ vw right     MRI ankle/heel right wo contrast     MRI foot/forefoot toes right wo contrast     VAS ARTERIAL DUPLEX- LOWER LIMB BILATERAL     Wound cleansing and dressings     Referral to [facility]      3. PAD (peripheral artery disease) ([facility])  I73.9 XR foot 3+ vw right     MRI ankle/heel right wo contrast     MRI foot/forefoot toes right wo contrast     VAS ARTERIAL DUPLEX- LOWER LIMB BILATERAL     Referral to [facility]             -Multiple bilateral lower extremity ulcerations with some probable to bone and some exposed tendon complicated by diabetes peripheral arterial disease and noncompliance.  At this time I will obtain x-rays and MRIs and peripheral arterial disease to evaluate for infection as well as ability to heal ulcers.  Patient and daughter were both informed patient is at high risk of limb loss given significant ulcerations.  -Continue local wound care as detailed below.  Wounds debrided as below.  -Discussed importance of offloading the wound area  -Discussed importance of glycemic control proper protein intake for wound healing  -Discussed importance of smoking cessation and overall its effect on wound healing and distal perfusion.  -Recent PCP notes reviewed recent blood work reviewed  -Patient and daughter were both educated on clinical signs of wound infection if present there to visit nearby ER immediately.  -Return in 1 week for follow-up    Lab Results   Component Value Date    HGBA1C 6.5 (H) 11/07/2023           Chief Complaint   Patient presents with    New Patient Visit     Right ankle wounds , medial, lateral and posterior started November 2023. Applying alginate and a dry dressing every 1 to 2 days. Right 2nd toe wound started in October  , left lateral ankle scab with no dressing          Subjective:   78-year-old female with past medical history as below presents for evaluation of bilateral lower extremity ulcerations with duration of months.  Patient is accompanied by daughter who reports the wounds most likely have started in 2023.  Patient denies pain from the ulcers.  She does not recall how she initially had these ulcers.  She denies any trauma.  She is type II diabetic and active cigarette smoker.  Denies nausea vomit fever chills shortness of breath.  Discussed importance smoking cessation and overall its effect on wound healing.        The following portions of the patient's history were reviewed and updated as appropriate:   Patient Active Problem List   Diagnosis    Type 2 diabetes mellitus with diabetic polyneuropathy (HCC)    Essential hypertension    Anxiety and depression    Medicare annual wellness visit, subsequent    GERD without esophagitis    Reactive airway disease with acute exacerbation    Urinary incontinence    Degenerative lumbar disc    Hypokalemia    Hematuria    Elevated troponin    Lactic acidosis    Hypomagnesemia    Preoperative examination    Bladder neoplasm    Left renal mass    PVD (peripheral vascular disease) ([facility])    Abnormal CT of the chest    COPD, severity to be determined ([facility])    Tobacco use disorder    Age-related osteoporosis without current pathological fracture    Ankle ulcer, right, with fat layer exposed ([facility])     Past Medical History:   Diagnosis Date    Anxiety     Closed fracture of coccyx ([facility]) 2023    Diabetes mellitus (HCC)     GERD (gastroesophageal reflux disease)     Hyperlipidemia     Hypertension     IBS (irritable bowel syndrome)     Shoulder fracture      Past Surgical History:   Procedure Laterality Date    ANKLE FRACTURE SURGERY Right     has screw in place     SECTION      x2    CHOLECYSTECTOMY      CYSTOSCOPY W/ LASER LITHOTRIPSY Left 2023    Procedure:  CYSTOSCOPY; RETROGRADE; URETEROSCOPY; BIOPSY; LEFT -INSERTION OF STENT;  Surgeon: Cristian Child MD;  Location: [facility];  Service: Urology    CYSTOSCOPY W/ LASER LITHOTRIPSY Left 11/20/2023    Procedure: CYSTOSCOPY; RETROGRADE; URETEROSCOPY; 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;  Surgeon: Cristian Child MD;  Location: [facility];  Service: Urology    FL RETROGRADE PYELOGRAM  9/18/2023    HERNIA REPAIR      umbilicus w/ mesh     Social History     Socioeconomic History    Marital status: /Civil Union     Spouse name: Not on file    Number of children: Not on file    Years of education: Not on file    Highest education level: Not on file   Occupational History    Not on file   Tobacco Use    Smoking status: Every Day     Current packs/day: 1.00     Average packs/day: 1 pack/day for 63.0 years (63.0 ttl pk-yrs)     Types: Cigarettes     Start date: 1961    Smokeless tobacco: Never    Tobacco comments:     11/14/23 smokes about 3/4 PPD   Vaping Use    Vaping status: Never Used   Substance and Sexual Activity    Alcohol use: Not Currently    Drug use: Never    Sexual activity: Not on file   Other Topics Concern    Not on file   Social History Narrative    Not on file     Social Determinants of Health     Financial Resource Strain: Low Risk  (2/9/2023)    Overall Financial Resource Strain (CARDIA)     Difficulty of Paying Living Expenses: Not hard at all   Food Insecurity: No Food Insecurity (8/11/2023)    Hunger Vital Sign     Worried About Running Out of Food in the Last Year: Never true     Ran Out of Food in the Last Year: Never true   Transportation Needs: No Transportation Needs (8/11/2023)    PRAPARE - Transportation     Lack of Transportation (Medical): No     Lack of Transportation (Non-Medical): No   Physical Activity: Not on file   Stress: Not on file   Social Connections: Not on file   Intimate Partner Violence: Not on file   Housing Stability: Low Risk  (8/11/2023)    Housing  Stability Vital Sign     Unable to Pay for Housing in the Last Year: No     Number of Places Lived in the Last Year: 1     Unstable Housing in the Last Year: No        Current Outpatient Medications:     acetaminophen (TYLENOL) 325 mg tablet, Take 650 mg by mouth every 6 (six) hours as needed for mild pain, Disp: , Rfl:     albuterol (Ventolin HFA) 90 mcg/act inhaler, Inhale 2 puffs every 6 (six) hours as needed for wheezing, Disp: 18 g, Rfl: 0    AMILoride 5 mg tablet, Take 1 tablet (5 mg total) by mouth daily, Disp: 90 tablet, Rfl: 5    Apoaequorin (Prevagen) 10 MG CAPS, Take by mouth, Disp: , Rfl:     Aspirin (ASPIR-81 PO), take one by mouth daily, Disp: , Rfl:     atorvastatin (LIPITOR) 10 mg tablet, Take 1 tablet (10 mg total) by mouth daily with dinner, Disp: 90 tablet, Rfl: 1    Blood Glucose Monitoring Suppl (ONETOUCH VERIO) w/Device KIT, by Does not apply route 2 (two) times a day, Disp: 1 kit, Rfl: 0    buPROPion (WELLBUTRIN XL) 300 mg 24 hr tablet, Take 1 tablet (300 mg total) by mouth daily, Disp: 90 tablet, Rfl: 0    clonazePAM (KlonoPIN) 1 mg tablet, Take 1 tablet (1 mg total) by mouth every evening, Disp: 30 tablet, Rfl: 3    Fluticasone-Salmeterol (Advair Diskus) 250-50 mcg/dose inhaler, Inhale 1 puff 2 (two) times a day Rinse mouth after use., Disp: 60 blister, Rfl: 0    glucose blood (OneTouch Verio) test strip, TEST BLOOD GLUCOSE TWO TIMES A DAY, Disp: 100 strip, Rfl: 5    Januvia 100 MG tablet, Take 1 tablet (100 mg total) by mouth daily, Disp: 90 tablet, Rfl: 2    Lancets (OneTouch Delica Plus [id number]) MISC, TEST TWO TIMES A DAY, Disp: 100 each, Rfl: 5    lisinopril (ZESTRIL) 5 mg tablet, Take 1 tablet (5 mg total) by mouth daily, Disp: 90 tablet, Rfl: 5    metFORMIN (GLUCOPHAGE-XR) 500 mg 24 hr tablet, Take 2 tablets (1,000 mg total) by mouth 2 (two) times a day, Disp: 360 tablet, Rfl: 1    Misc. Devices (Carex Coccyx Cushion) MISC, Use in the morning, Disp: 1 each, Rfl: 0    Omega-3 Fatty  "Acids (FISH OIL PO), , Disp: , Rfl:     pantoprazole (PROTONIX) 40 mg tablet, 40 mg, Disp: , Rfl:     potassium chloride (K-DUR,KLOR-CON) 10 mEq tablet, Take 1 tablet (10 mEq total) by mouth 2 (two) times a day, Disp: 180 tablet, Rfl: 0    pregabalin (LYRICA) 200 MG capsule, TAKE ONE CAPSULE BY MOUTH THREE TIMES A DAY, Disp: 90 capsule, Rfl: 2    solifenacin (VESICARE) 5 mg tablet, Take 1 tablet (5 mg total) by mouth daily, Disp: 90 tablet, Rfl: 0  No current facility-administered medications for this visit.  Family History   Problem Relation Age of Onset    COPD Mother     Emphysema Mother     COPD Father     Emphysema Father       Review of Systems   All other systems reviewed and are negative.      Allergies  Paroxetine, Adhesive [medical tape], Carafate [sucralfate], Sulfa antibiotics, and Sulfamethoxazole-trimethoprim    Objective:  /62   Pulse 86   Temp 97.5 °F (36.4 °C)   Resp 20   Ht 5' 1\" (1.549 m)   Wt 68.9 kg (152 lb)   BMI 28.72 kg/m²     Physical Exam  Vitals reviewed.   Cardiovascular:      Pulses:           Dorsalis pedis pulses are detected w/ Doppler on the right side and detected w/ Doppler on the left side.        Posterior tibial pulses are detected w/ Doppler on the right side and detected w/ Doppler on the left side.   Musculoskeletal:      Right foot: Deformity present.      Left foot: Deformity present.   Feet:      Right foot:      Skin integrity: Ulcer and skin breakdown present.      Left foot:      Skin integrity: Ulcer and skin breakdown present.      Comments: Right medial and posterior ankle ulcer noted.  Posterior ulcer with exposed Achilles tendon.  Tendon is fibrotic and necrotic.  Right medial ulcer probable to fascia with fibrotic and necrotic wound base.  Right lateral ulcer ankle with scab.  Right second toe ulcer probable to bone still tip.  Toe is edematous.  No erythema present no active drainage no malodor.  -Left lateral ankle ulcer with scab.    Please see " pictures for complete evaluation.            Wound 01/09/24 Arterial Ulcer Ankle Medial;Right (Active)   Wound Image   01/09/24 1455   Wound Description Yellow;Brown;Slough;Pale;Pink 01/09/24 1502   Ani-wound Assessment Pink;Edema 01/09/24 1502   Wound Length (cm) 2.4 cm 01/09/24 1502   Wound Width (cm) 3.8 cm 01/09/24 1502   Wound Depth (cm) 0.2 cm 01/09/24 1502   Wound Surface Area (cm^2) 9.12 cm^2 01/09/24 1502   Wound Volume (cm^3) 1.824 cm^3 01/09/24 1502   Calculated Wound Volume (cm^3) 1.82 cm^3 01/09/24 1502   Drainage Amount Moderate 01/09/24 1502   Drainage Description Serous;Brown 01/09/24 1502   Non-staged Wound Description Full thickness 01/09/24 1502   Dressing Status Intact 01/09/24 1502       Wound 01/09/24 Arterial Ulcer Ankle Lateral;Right (Active)   Wound Image   01/09/24 1455   Wound Description Other (Comment);Eschar 01/09/24 1503   Ani-wound Assessment [place] 01/09/24 1503   Wound Length (cm) 0.7 cm 01/09/24 1503   Wound Width (cm) 0.4 cm 01/09/24 1503   Wound Depth (cm) 0 cm 01/09/24 1503   Wound Surface Area (cm^2) 0.28 cm^2 01/09/24 1503   Wound Volume (cm^3) 0 cm^3 01/09/24 1503   Calculated Wound Volume (cm^3) 0 cm^3 01/09/24 1503   Drainage Amount None 01/09/24 1503   Dressing Status Intact 01/09/24 1503       Wound 01/09/24 Arterial Ulcer Ankle Right;Posterior (Active)   Wound Image   01/09/24 1456   Wound Description Black;Yellow;Eschar;Exposed tendon;White 01/09/24 1505   Ani-wound Assessment [place] 01/09/24 1505   Wound Length (cm) 4 cm 01/09/24 1505   Wound Width (cm) 2 cm 01/09/24 1505   Wound Depth (cm) 0.1 cm 01/09/24 1505   Wound Surface Area (cm^2) 8 cm^2 01/09/24 1505   Wound Volume (cm^3) 0.8 cm^3 01/09/24 1505   Calculated Wound Volume (cm^3) 0.8 cm^3 01/09/24 1505   Drainage Amount Scant 01/09/24 1505   Drainage Description Serous 01/09/24 1505   Dressing Status Intact 01/09/24 1505       Wound 01/09/24 Arterial Ulcer Ankle Left;Lateral (Active)   Wound Image   01/09/24 7041  "  Wound Description Other (Comment) 01/09/24 1506   Ani-wound Assessment Intact 01/09/24 1506   Wound Length (cm) 0.5 cm 01/09/24 1506   Wound Width (cm) 0.3 cm 01/09/24 1506   Wound Depth (cm) 0 cm 01/09/24 1506   Wound Surface Area (cm^2) 0.15 cm^2 01/09/24 1506   Wound Volume (cm^3) 0 cm^3 01/09/24 1506   Calculated Wound Volume (cm^3) 0 cm^3 01/09/24 1506   Drainage Amount None 01/09/24 1506   Dressing Status Other (Comment) 01/09/24 1506       Wound 01/09/24 Diabetic Ulcer Toe D2, second Right (Active)   Enter Daniel score: Daniel Grade 2: Deep ulcer extended to ligament, tendon, joint capsule, bone, or deep fascia without abscess or osteomyelitis (OM) 01/09/24 1459   Wound Image   01/09/24 1529   Wound Description Dry;Tan;Brown 01/09/24 1459   Ani-wound Assessment Dry;Callus;Scaly;Pink 01/09/24 1459   Wound Length (cm) 0.4 cm 01/09/24 1459   Wound Width (cm) 0.3 cm 01/09/24 1459   Wound Depth (cm) 0 cm 01/09/24 1459   Wound Surface Area (cm^2) 0.12 cm^2 01/09/24 1459   Wound Volume (cm^3) 0 cm^3 01/09/24 1459   Calculated Wound Volume (cm^3) 0 cm^3 01/09/24 1459   Drainage Amount None 01/09/24 1459   Dressing Status Intact 01/09/24 1459                             Debridement   Wound 01/09/24 Diabetic Ulcer Toe D2, second Right    Universal Protocol:  Consent: Verbal consent obtained.  Risks and benefits: risks, benefits and alternatives were discussed  Consent given by: patient  Time out: Immediately prior to procedure a \"time out\" was called to verify the correct patient, procedure, equipment, support staff and site/side marked as required.  Patient understanding: patient states understanding of the procedure being performed  Patient identity confirmed: verbally with patient    Debridement Details  Performed by: physician  Debridement type: surgical  Level of debridement: subcutaneous tissue  Pain control: lidocaine 4%      Post-debridement measurements  Length (cm): 0.4  Width (cm): 0.5  Depth (cm): " 0.5  Percent debrided: 100%  Surface Area (cm^2): 0.2  Area Debrided (cm^2): 0.2  Volume (cm^3): 0.1    Tissue and other material debrided: subcutaneous tissue  Devitalized tissue debrided: biofilm, callus, exudate, slough and eschar  Instrument(s) utilized: blade  Bleeding: small  Hemostasis obtained with: pressure  Procedural pain (0-10): insensate  Post-procedural pain: insensate   Response to treatment: procedure was tolerated well               Wound Instructions:  Orders Placed This Encounter   Procedures    Wound cleansing and dressings     Wash your hands with soap and water.  Remove old dressing, discard into plastic bag and place in trash.  Cleanse the wound with unscented soap and water prior to applying a clean dressing. Do not use tissue or cotton balls. Do not scrub the wound. Pat dry using gauze.  Shower no Do not get dressing wet, do not soak foot in any water    Right ankle medial, lateral, and posterior wounds   Apply Mupirocin to all wounds.  Cover with Adaptic.  Cover with ABD pads.   Secure with Alicia and tape   Change dressing 3 times per week     Left ankle lateral wound   Apply Mupirocin to all wounds.  Cover with Adaptic.  Cover with gauze.   Secure with Alicia and tape   Change dressing 3 times per week    Right 2nd toe wound   Apply Acticoat 3 to toe wound, cover with gauze.   Secure with tape  Change dressing 3 times per week    Please try to keep Blood Sugars <120    Increase protein in your diet with each meal (eggs, nuts, protein shakes, meat, chicken, greek yogurt)        prescription for Mupirocin from Pharmacy    If you have any Signs or Symptoms of infection (fever, redness, increased pain or drainage or odor) please go to emergency room to be evaluated - [facility]     Obtain vascular study as ordered as soon as possible   Obtain X-Rays as ordered prior to next visit   MRI foot and ankle as ordered     [facility] ordered for you today     Follow up  in 1 week     Standing Status:   Future     Standing Expiration Date:   1/9/2025    Debridement     This order was created via procedure documentation    XR foot 3+ vw right     Right 2nd toe ulcer probes to bone     Standing Status:   Future     Standing Expiration Date:   1/9/2028     Scheduling Instructions:      Bring along any outside films relating to this procedure.          MRI ankle/heel right wo contrast     Standing Status:   Future     Standing Expiration Date:   1/9/2028     Scheduling Instructions:      There is no preparation for this test. Please leave your jewelry and valuables at home, wedding rings are the exception. All patients will be required to change into a hospital gown and pants.  Street clothes are not permitted in the MRI.  Magnetic nail polish must be removed prior to arrival for your test. Please bring your insurance cards, a form of photo ID and a list of your medications with you. Arrive 15 minutes prior to your appointment time in order to register. Please bring any prior CT or MRI studies of this area that were not performed at a [facility].            To schedule this appointment, please contact Central Scheduling at [phone].            Prior to your appointment, please make sure you complete the MRI Screening Form when you e-Check in for your appointment. This will be available starting 7 days before your appointment in [employer]. You may receive an e-mail with an activation code if you do not have a [employer] account. If you do not have access to a device, we will complete your screening at your appointment.     Order Specific Question:   What is the patient's sedation requirement? If Medication for Claustrophobia is selected, order medication at this point.     Answer:   No Sedation     Order Specific Question:   Does the patient have metallic implants?     Answer:   No     Order Specific Question:   Does this procedure require the 3T MRI at [place] or [place]?      Answer:   No     Order Specific Question:   Release to patient through "[employer]     Answer:   Immediate     Order Specific Question:   Is order priority selected as STAT?     Answer:   No     Order Specific Question:   Reason for Exam (FREE TEXT)     Answer:   Right posterior heel ulcer with exposed Achilles tendon, medial ankle ulcer, lateral ankle ulcer. Suspect OM given chronic wound     Order Specific Question:   When should the test be performed?     Answer:   in 1 week    MRI foot/forefoot toes right wo contrast     Standing Status:   Future     Standing Expiration Date:   1/9/2028     Scheduling Instructions:      There is no preparation for this test. Please leave your jewelry and valuables at home, wedding rings are the exception. All patients will be required to change into a hospital gown and pants.  Street clothes are not permitted in the MRI.  Magnetic nail polish must be removed prior to arrival for your test. Please bring your insurance cards, a form of photo ID and a list of your medications with you. Arrive 15 minutes prior to your appointment time in order to register. Please bring any prior CT or MRI studies of this area that were not performed at a [facility].            To schedule this appointment, please contact Central Scheduling at [phone].            Prior to your appointment, please make sure you complete the MRI Screening Form when you e-Check in for your appointment. This will be available starting 7 days before your appointment in [employer]. You may receive an e-mail with an activation code if you do not have a [employer] account. If you do not have access to a device, we will complete your screening at your appointment.     Order Specific Question:   What is the patient's sedation requirement? If Medication for Claustrophobia is selected, order medication at this point.     Answer:   No Sedation     Order Specific Question:   Does the patient have metallic implants?      "Answer:   No     Order Specific Question:   Does this procedure require the 3T MRI at [place] or [place]?     Answer:   No     Order Specific Question:   [facility]     Answer:   Immediate     Order Specific Question:   Is order priority selected as STAT?     Answer:   No     Order Specific Question:   Reason for Exam (FREE TEXT)     Answer:   RIght 2nd toe ulcer, OM    Referral to Home [facility]     Standing Status:   Future     Standing Expiration Date:   1/9/2025     Referral Priority:   Routine     Referral Type:   Home Health     Referral Reason:   Specialty Services Required     Requested Specialty:   Home Health Services     Number of Visits Requested:   1     Expiration Date:   1/9/2025         Jennifer Gann DPM    Portions of the record may have been created with voice recognition software. Occasional wrong word or \"sound a like\" substitutions may have occurred due to the inherent limitations of voice recognition software. Read the chart carefully and recognize, using context, where substitutions have occurred.      "

## 2024-01-11 ENCOUNTER — HOME CARE VISIT (OUTPATIENT)
Dept: HOME HEALTH SERVICES | Facility: HOME HEALTHCARE | Age: 79
End: 2024-01-11

## 2024-01-11 NOTE — CASE COMMUNICATION
Cancelled HH Admission:   Pt not homebound and does not need HH services. Pt's son -in-law performs the woundcare.

## 2024-01-14 NOTE — HOME HEALTH
SN arrived at patient's home as scheduled. SN introduced herself and reviewed Home health care services with patient . SN reviewed Homecare criteria.  Pt verbalized understanding.  Initially patient in agreement to have sn come in, but as the visit went on, it was made clear that the patient was not homebound.  Pt told sn that she is the sole  for the family and is out frequently doing her errors, etc.  SN inquired if someone was instructed on the woundcare. Patient told sn that her son-in-law knew how to do it and has been doing it.  SN reviewed the wound care orders, s.s of infection, medications. Pt verbalized good understanding. SN gave patient phone number for   homecare, in case she had any questions. Pt not admitted to homecare services.

## 2024-01-15 ENCOUNTER — APPOINTMENT (OUTPATIENT)
Dept: RADIOLOGY | Facility: CLINIC | Age: 79
End: 2024-01-15
Payer: COMMERCIAL

## 2024-01-15 DIAGNOSIS — I77.1 ARTERIAL INSUFFICIENCY WITH ISCHEMIC ULCER (HCC): ICD-10-CM

## 2024-01-15 DIAGNOSIS — I73.9 PAD (PERIPHERAL ARTERY DISEASE) (HCC): ICD-10-CM

## 2024-01-15 DIAGNOSIS — L97.516 DIABETIC ULCER OF TOE OF RIGHT FOOT ASSOCIATED WITH TYPE 2 DIABETES MELLITUS, WITH BONE INVOLVEMENT WITHOUT EVIDENCE OF NECROSIS (HCC): ICD-10-CM

## 2024-01-15 DIAGNOSIS — E11.621 DIABETIC ULCER OF TOE OF RIGHT FOOT ASSOCIATED WITH TYPE 2 DIABETES MELLITUS, WITH BONE INVOLVEMENT WITHOUT EVIDENCE OF NECROSIS (HCC): ICD-10-CM

## 2024-01-15 DIAGNOSIS — L98.499 ARTERIAL INSUFFICIENCY WITH ISCHEMIC ULCER (HCC): ICD-10-CM

## 2024-01-15 PROCEDURE — 73630 X-RAY EXAM OF FOOT: CPT

## 2024-01-22 ENCOUNTER — HOSPITAL ENCOUNTER (OUTPATIENT)
Dept: NON INVASIVE DIAGNOSTICS | Facility: HOSPITAL | Age: 79
Discharge: HOME | DRG: 638 | End: 2024-01-22
Attending: STUDENT IN AN ORGANIZED HEALTH CARE EDUCATION/TRAINING PROGRAM
Payer: COMMERCIAL

## 2024-01-22 DIAGNOSIS — L98.499 ARTERIAL INSUFFICIENCY WITH ISCHEMIC ULCER (HCC): ICD-10-CM

## 2024-01-22 DIAGNOSIS — I77.1 ARTERIAL INSUFFICIENCY WITH ISCHEMIC ULCER (HCC): ICD-10-CM

## 2024-01-22 DIAGNOSIS — L97.516 DIABETIC ULCER OF TOE OF RIGHT FOOT ASSOCIATED WITH TYPE 2 DIABETES MELLITUS, WITH BONE INVOLVEMENT WITHOUT EVIDENCE OF NECROSIS (HCC): ICD-10-CM

## 2024-01-22 DIAGNOSIS — I73.9 PAD (PERIPHERAL ARTERY DISEASE) (HCC): ICD-10-CM

## 2024-01-22 DIAGNOSIS — E11.621 DIABETIC ULCER OF TOE OF RIGHT FOOT ASSOCIATED WITH TYPE 2 DIABETES MELLITUS, WITH BONE INVOLVEMENT WITHOUT EVIDENCE OF NECROSIS (HCC): ICD-10-CM

## 2024-01-22 PROCEDURE — 93923 UPR/LXTR ART STDY 3+ LVLS: CPT | Performed by: SURGERY

## 2024-01-22 PROCEDURE — 93925 LOWER EXTREMITY STUDY: CPT

## 2024-01-22 PROCEDURE — 93923 UPR/LXTR ART STDY 3+ LVLS: CPT

## 2024-01-22 PROCEDURE — 93925 LOWER EXTREMITY STUDY: CPT | Performed by: SURGERY

## 2024-01-23 ENCOUNTER — OFFICE VISIT (OUTPATIENT)
Dept: WOUND CARE | Facility: CLINIC | Age: 79
End: 2024-01-23
Payer: COMMERCIAL

## 2024-01-23 ENCOUNTER — HOSPITAL ENCOUNTER (INPATIENT)
Facility: HOSPITAL | Age: 79
LOS: 3 days | Discharge: ANOTHER HOSPITAL | DRG: 638 | End: 2024-01-26
Attending: EMERGENCY MEDICINE | Admitting: HOSPITALIST
Payer: COMMERCIAL

## 2024-01-23 ENCOUNTER — APPOINTMENT (EMERGENCY)
Dept: RADIOLOGY | Facility: HOSPITAL | Age: 79
DRG: 638 | End: 2024-01-23
Payer: COMMERCIAL

## 2024-01-23 VITALS
HEART RATE: 85 BPM | TEMPERATURE: 97.7 F | DIASTOLIC BLOOD PRESSURE: 78 MMHG | SYSTOLIC BLOOD PRESSURE: 128 MMHG | RESPIRATION RATE: 18 BRPM

## 2024-01-23 DIAGNOSIS — M86.671 OTHER CHRONIC OSTEOMYELITIS OF RIGHT FOOT (HCC): ICD-10-CM

## 2024-01-23 DIAGNOSIS — M86.9 OSTEOMYELITIS (HCC): Primary | ICD-10-CM

## 2024-01-23 DIAGNOSIS — L03.031 CELLULITIS OF TOE OF RIGHT FOOT: ICD-10-CM

## 2024-01-23 DIAGNOSIS — E11.621 DIABETIC ULCER OF TOE OF RIGHT FOOT ASSOCIATED WITH TYPE 2 DIABETES MELLITUS, WITH BONE INVOLVEMENT WITHOUT EVIDENCE OF NECROSIS (HCC): Primary | ICD-10-CM

## 2024-01-23 DIAGNOSIS — I73.9 PERIPHERAL ARTERIAL DISEASE (HCC): ICD-10-CM

## 2024-01-23 DIAGNOSIS — I77.1 ARTERIAL INSUFFICIENCY WITH ISCHEMIC ULCER (HCC): ICD-10-CM

## 2024-01-23 DIAGNOSIS — L97.516 DIABETIC ULCER OF TOE OF RIGHT FOOT ASSOCIATED WITH TYPE 2 DIABETES MELLITUS, WITH BONE INVOLVEMENT WITHOUT EVIDENCE OF NECROSIS (HCC): Primary | ICD-10-CM

## 2024-01-23 DIAGNOSIS — I73.9 PAD (PERIPHERAL ARTERY DISEASE) (HCC): ICD-10-CM

## 2024-01-23 DIAGNOSIS — L97.312 ANKLE ULCER, RIGHT, WITH FAT LAYER EXPOSED (HCC): ICD-10-CM

## 2024-01-23 DIAGNOSIS — L03.90 CELLULITIS: ICD-10-CM

## 2024-01-23 DIAGNOSIS — E11.49 OTHER DIABETIC NEUROLOGICAL COMPLICATION ASSOCIATED WITH TYPE 2 DIABETES MELLITUS (HCC): ICD-10-CM

## 2024-01-23 DIAGNOSIS — L98.499 ARTERIAL INSUFFICIENCY WITH ISCHEMIC ULCER (HCC): ICD-10-CM

## 2024-01-23 PROBLEM — R93.89 ABNORMAL CT OF THE CHEST: Chronic | Status: ACTIVE | Noted: 2023-10-17

## 2024-01-23 PROBLEM — R79.89 ELEVATED TROPONIN: Status: RESOLVED | Noted: 2023-08-11 | Resolved: 2024-01-23

## 2024-01-23 PROBLEM — J45.901 REACTIVE AIRWAY DISEASE WITH ACUTE EXACERBATION: Status: RESOLVED | Noted: 2020-02-17 | Resolved: 2024-01-23

## 2024-01-23 PROBLEM — E87.6 HYPOKALEMIA: Status: RESOLVED | Noted: 2023-08-11 | Resolved: 2024-01-23

## 2024-01-23 PROBLEM — D49.4 BLADDER NEOPLASM: Chronic | Status: ACTIVE | Noted: 2023-09-11

## 2024-01-23 PROBLEM — E87.20 LACTIC ACIDOSIS: Chronic | Status: ACTIVE | Noted: 2023-08-11

## 2024-01-23 PROBLEM — I10 ESSENTIAL HYPERTENSION: Chronic | Status: ACTIVE | Noted: 2019-02-15

## 2024-01-23 PROBLEM — F17.200 TOBACCO USE DISORDER: Chronic | Status: ACTIVE | Noted: 2023-10-17

## 2024-01-23 PROBLEM — L97.509 DIABETIC FOOT ULCER WITH OSTEOMYELITIS (HCC): Status: ACTIVE | Noted: 2024-01-23

## 2024-01-23 PROBLEM — Z01.818 PREOPERATIVE EXAMINATION: Status: RESOLVED | Noted: 2023-09-11 | Resolved: 2024-01-23

## 2024-01-23 PROBLEM — R31.9 HEMATURIA: Status: RESOLVED | Noted: 2023-08-11 | Resolved: 2024-01-23

## 2024-01-23 PROBLEM — J44.9 COPD, SEVERITY TO BE DETERMINED (HCC): Chronic | Status: ACTIVE | Noted: 2023-10-17

## 2024-01-23 PROBLEM — E11.69 DIABETIC FOOT ULCER WITH OSTEOMYELITIS (HCC): Status: ACTIVE | Noted: 2024-01-23

## 2024-01-23 LAB
2HR DELTA HS TROPONIN: 0 NG/L
ALBUMIN SERPL BCP-MCNC: 4.2 G/DL (ref 3.5–5)
ALP SERPL-CCNC: 64 U/L (ref 34–104)
ALT SERPL W P-5'-P-CCNC: 7 U/L (ref 7–52)
ANION GAP SERPL CALCULATED.3IONS-SCNC: 11 MMOL/L
APTT PPP: 29 SECONDS (ref 23–37)
AST SERPL W P-5'-P-CCNC: 10 U/L (ref 13–39)
BASOPHILS # BLD AUTO: 0.06 THOUSANDS/ÂΜL (ref 0–0.1)
BASOPHILS NFR BLD AUTO: 1 % (ref 0–1)
BILIRUB SERPL-MCNC: 0.21 MG/DL (ref 0.2–1)
BUN SERPL-MCNC: 12 MG/DL (ref 5–25)
CALCIUM SERPL-MCNC: 10.3 MG/DL (ref 8.4–10.2)
CARDIAC TROPONIN I PNL SERPL HS: 4 NG/L
CARDIAC TROPONIN I PNL SERPL HS: 4 NG/L
CHLORIDE SERPL-SCNC: 99 MMOL/L (ref 96–108)
CK SERPL-CCNC: 40 U/L (ref 26–192)
CO2 SERPL-SCNC: 25 MMOL/L (ref 21–32)
CREAT SERPL-MCNC: 0.54 MG/DL (ref 0.6–1.3)
CRP SERPL QL: 24.2 MG/L
EOSINOPHIL # BLD AUTO: 0.35 THOUSAND/ÂΜL (ref 0–0.61)
EOSINOPHIL NFR BLD AUTO: 3 % (ref 0–6)
ERYTHROCYTE [DISTWIDTH] IN BLOOD BY AUTOMATED COUNT: 12.7 % (ref 11.6–15.1)
ERYTHROCYTE [SEDIMENTATION RATE] IN BLOOD: 38 MM/HOUR (ref 0–29)
GFR SERPL CREATININE-BSD FRML MDRD: 91 ML/MIN/1.73SQ M
GLUCOSE SERPL-MCNC: 120 MG/DL (ref 65–140)
GLUCOSE SERPL-MCNC: 87 MG/DL (ref 65–140)
HCT VFR BLD AUTO: 42.7 % (ref 34.8–46.1)
HGB BLD-MCNC: 14 G/DL (ref 11.5–15.4)
IMM GRANULOCYTES # BLD AUTO: 0.03 THOUSAND/UL (ref 0–0.2)
IMM GRANULOCYTES NFR BLD AUTO: 0 % (ref 0–2)
INR PPP: 1.03 (ref 0.84–1.19)
LACTATE SERPL-SCNC: 1.7 MMOL/L (ref 0.5–2)
LACTATE SERPL-SCNC: 3.3 MMOL/L (ref 0.5–2)
LYMPHOCYTES # BLD AUTO: 3.49 THOUSANDS/ÂΜL (ref 0.6–4.47)
LYMPHOCYTES NFR BLD AUTO: 27 % (ref 14–44)
MCH RBC QN AUTO: 31.5 PG (ref 26.8–34.3)
MCHC RBC AUTO-ENTMCNC: 32.8 G/DL (ref 31.4–37.4)
MCV RBC AUTO: 96 FL (ref 82–98)
MONOCYTES # BLD AUTO: 0.76 THOUSAND/ÂΜL (ref 0.17–1.22)
MONOCYTES NFR BLD AUTO: 6 % (ref 4–12)
NEUTROPHILS # BLD AUTO: 8.04 THOUSANDS/ÂΜL (ref 1.85–7.62)
NEUTS SEG NFR BLD AUTO: 63 % (ref 43–75)
NRBC BLD AUTO-RTO: 0 /100 WBCS
PLATELET # BLD AUTO: 404 THOUSANDS/UL (ref 149–390)
PMV BLD AUTO: 8.9 FL (ref 8.9–12.7)
POTASSIUM SERPL-SCNC: 4.5 MMOL/L (ref 3.5–5.3)
PROCALCITONIN SERPL-MCNC: <0.05 NG/ML
PROT SERPL-MCNC: 7.3 G/DL (ref 6.4–8.4)
PROTHROMBIN TIME: 13.6 SECONDS (ref 11.6–14.5)
RBC # BLD AUTO: 4.45 MILLION/UL (ref 3.81–5.12)
SODIUM SERPL-SCNC: 135 MMOL/L (ref 135–147)
WBC # BLD AUTO: 12.73 THOUSAND/UL (ref 4.31–10.16)

## 2024-01-23 PROCEDURE — 93005 ELECTROCARDIOGRAM TRACING: CPT

## 2024-01-23 PROCEDURE — 85652 RBC SED RATE AUTOMATED: CPT | Performed by: PHYSICIAN ASSISTANT

## 2024-01-23 PROCEDURE — 99285 EMERGENCY DEPT VISIT HI MDM: CPT | Performed by: EMERGENCY MEDICINE

## 2024-01-23 PROCEDURE — 83605 ASSAY OF LACTIC ACID: CPT | Performed by: EMERGENCY MEDICINE

## 2024-01-23 PROCEDURE — 85025 COMPLETE CBC W/AUTO DIFF WBC: CPT | Performed by: EMERGENCY MEDICINE

## 2024-01-23 PROCEDURE — 73630 X-RAY EXAM OF FOOT: CPT

## 2024-01-23 PROCEDURE — 87070 CULTURE OTHR SPECIMN AEROBIC: CPT | Performed by: EMERGENCY MEDICINE

## 2024-01-23 PROCEDURE — 85610 PROTHROMBIN TIME: CPT | Performed by: EMERGENCY MEDICINE

## 2024-01-23 PROCEDURE — 99215 OFFICE O/P EST HI 40 MIN: CPT | Performed by: STUDENT IN AN ORGANIZED HEALTH CARE EDUCATION/TRAINING PROGRAM

## 2024-01-23 PROCEDURE — 99223 1ST HOSP IP/OBS HIGH 75: CPT | Performed by: PHYSICIAN ASSISTANT

## 2024-01-23 PROCEDURE — 99284 EMERGENCY DEPT VISIT MOD MDM: CPT

## 2024-01-23 PROCEDURE — 84484 ASSAY OF TROPONIN QUANT: CPT | Performed by: EMERGENCY MEDICINE

## 2024-01-23 PROCEDURE — 87040 BLOOD CULTURE FOR BACTERIA: CPT | Performed by: EMERGENCY MEDICINE

## 2024-01-23 PROCEDURE — 96365 THER/PROPH/DIAG IV INF INIT: CPT

## 2024-01-23 PROCEDURE — 84145 PROCALCITONIN (PCT): CPT | Performed by: EMERGENCY MEDICINE

## 2024-01-23 PROCEDURE — 87205 SMEAR GRAM STAIN: CPT | Performed by: EMERGENCY MEDICINE

## 2024-01-23 PROCEDURE — 80053 COMPREHEN METABOLIC PANEL: CPT | Performed by: EMERGENCY MEDICINE

## 2024-01-23 PROCEDURE — 82550 ASSAY OF CK (CPK): CPT | Performed by: EMERGENCY MEDICINE

## 2024-01-23 PROCEDURE — 86140 C-REACTIVE PROTEIN: CPT | Performed by: PHYSICIAN ASSISTANT

## 2024-01-23 PROCEDURE — 82948 REAGENT STRIP/BLOOD GLUCOSE: CPT

## 2024-01-23 PROCEDURE — 85730 THROMBOPLASTIN TIME PARTIAL: CPT | Performed by: EMERGENCY MEDICINE

## 2024-01-23 PROCEDURE — 36415 COLL VENOUS BLD VENIPUNCTURE: CPT | Performed by: EMERGENCY MEDICINE

## 2024-01-23 RX ORDER — INSULIN LISPRO 100 [IU]/ML
1-5 INJECTION, SOLUTION INTRAVENOUS; SUBCUTANEOUS
Status: DISCONTINUED | OUTPATIENT
Start: 2024-01-23 | End: 2024-01-26 | Stop reason: HOSPADM

## 2024-01-23 RX ORDER — NICOTINE 21 MG/24HR
1 PATCH, TRANSDERMAL 24 HOURS TRANSDERMAL DAILY
Status: DISCONTINUED | OUTPATIENT
Start: 2024-01-24 | End: 2024-01-26 | Stop reason: HOSPADM

## 2024-01-23 RX ORDER — ENOXAPARIN SODIUM 100 MG/ML
40 INJECTION SUBCUTANEOUS DAILY
Status: DISCONTINUED | OUTPATIENT
Start: 2024-01-24 | End: 2024-01-26 | Stop reason: HOSPADM

## 2024-01-23 RX ORDER — AMILORIDE HYDROCHLORIDE 5 MG/1
5 TABLET ORAL DAILY
Status: DISCONTINUED | OUTPATIENT
Start: 2024-01-24 | End: 2024-01-26 | Stop reason: HOSPADM

## 2024-01-23 RX ORDER — ATORVASTATIN CALCIUM 10 MG/1
10 TABLET, FILM COATED ORAL
Status: DISCONTINUED | OUTPATIENT
Start: 2024-01-24 | End: 2024-01-26 | Stop reason: HOSPADM

## 2024-01-23 RX ORDER — INSULIN LISPRO 100 [IU]/ML
5 INJECTION, SOLUTION INTRAVENOUS; SUBCUTANEOUS
Status: DISCONTINUED | OUTPATIENT
Start: 2024-01-24 | End: 2024-01-26 | Stop reason: HOSPADM

## 2024-01-23 RX ORDER — INSULIN LISPRO 100 [IU]/ML
1-5 INJECTION, SOLUTION INTRAVENOUS; SUBCUTANEOUS
Status: DISCONTINUED | OUTPATIENT
Start: 2024-01-24 | End: 2024-01-26 | Stop reason: HOSPADM

## 2024-01-23 RX ORDER — ACETAMINOPHEN 325 MG/1
650 TABLET ORAL EVERY 4 HOURS PRN
Status: DISCONTINUED | OUTPATIENT
Start: 2024-01-23 | End: 2024-01-26 | Stop reason: HOSPADM

## 2024-01-23 RX ORDER — CLONAZEPAM 1 MG/1
1 TABLET ORAL EVERY EVENING
Status: DISCONTINUED | OUTPATIENT
Start: 2024-01-23 | End: 2024-01-26 | Stop reason: HOSPADM

## 2024-01-23 RX ORDER — FLUTICASONE FUROATE AND VILANTEROL 200; 25 UG/1; UG/1
1 POWDER RESPIRATORY (INHALATION)
Status: DISCONTINUED | OUTPATIENT
Start: 2024-01-24 | End: 2024-01-26 | Stop reason: HOSPADM

## 2024-01-23 RX ORDER — ALBUTEROL SULFATE 90 UG/1
2 AEROSOL, METERED RESPIRATORY (INHALATION) EVERY 6 HOURS PRN
Status: DISCONTINUED | OUTPATIENT
Start: 2024-01-23 | End: 2024-01-26 | Stop reason: HOSPADM

## 2024-01-23 RX ORDER — PREGABALIN 100 MG/1
200 CAPSULE ORAL 3 TIMES DAILY
Status: DISCONTINUED | OUTPATIENT
Start: 2024-01-23 | End: 2024-01-26 | Stop reason: HOSPADM

## 2024-01-23 RX ORDER — CHLORAL HYDRATE 500 MG
1000 CAPSULE ORAL DAILY
Status: DISCONTINUED | OUTPATIENT
Start: 2024-01-24 | End: 2024-01-26 | Stop reason: HOSPADM

## 2024-01-23 RX ORDER — LISINOPRIL 5 MG/1
5 TABLET ORAL DAILY
Status: DISCONTINUED | OUTPATIENT
Start: 2024-01-24 | End: 2024-01-26 | Stop reason: HOSPADM

## 2024-01-23 RX ORDER — ONDANSETRON 2 MG/ML
4 INJECTION INTRAMUSCULAR; INTRAVENOUS EVERY 6 HOURS PRN
Status: DISCONTINUED | OUTPATIENT
Start: 2024-01-23 | End: 2024-01-26 | Stop reason: HOSPADM

## 2024-01-23 RX ORDER — BUPROPION HYDROCHLORIDE 150 MG/1
300 TABLET ORAL DAILY
Status: DISCONTINUED | OUTPATIENT
Start: 2024-01-24 | End: 2024-01-26 | Stop reason: HOSPADM

## 2024-01-23 RX ORDER — SODIUM CHLORIDE 9 MG/ML
100 INJECTION, SOLUTION INTRAVENOUS CONTINUOUS
Status: DISCONTINUED | OUTPATIENT
Start: 2024-01-23 | End: 2024-01-26 | Stop reason: HOSPADM

## 2024-01-23 RX ORDER — OXYBUTYNIN CHLORIDE 5 MG/1
5 TABLET, EXTENDED RELEASE ORAL DAILY
Status: DISCONTINUED | OUTPATIENT
Start: 2024-01-24 | End: 2024-01-26 | Stop reason: HOSPADM

## 2024-01-23 RX ORDER — PANTOPRAZOLE SODIUM 40 MG/1
40 TABLET, DELAYED RELEASE ORAL
Status: DISCONTINUED | OUTPATIENT
Start: 2024-01-24 | End: 2024-01-26 | Stop reason: HOSPADM

## 2024-01-23 RX ADMIN — PIPERACILLIN AND TAZOBACTAM 3.38 G: 3; .375 INJECTION, POWDER, FOR SOLUTION INTRAVENOUS at 19:53

## 2024-01-23 RX ADMIN — CLONAZEPAM 1 MG: 1 TABLET ORAL at 22:04

## 2024-01-23 RX ADMIN — PREGABALIN 200 MG: 100 CAPSULE ORAL at 22:04

## 2024-01-23 RX ADMIN — SODIUM CHLORIDE 100 ML/HR: 0.9 INJECTION, SOLUTION INTRAVENOUS at 22:04

## 2024-01-23 NOTE — PROGRESS NOTES
Patient ID: Lona Cedeno is a 78 y.o. female Date of Birth 1945     Diagnosis:  1. Diabetic ulcer of toe of right foot associated with type 2 diabetes mellitus, with bone involvement without evidence of necrosis ([facility])  -     Wound cleansing and dressings; Future    2. Arterial insufficiency with ischemic ulcer ([facility])    3. PAD (peripheral artery disease) ([facility])    4. Other diabetic neurological complication associated with type 2 diabetes mellitus ([facility])    5. Other chronic osteomyelitis of right foot ([facility])    6. Cellulitis of toe of right foot       Diagnosis ICD-10-CM Associated Orders   1. Diabetic ulcer of toe of right foot associated with type 2 diabetes mellitus, with bone involvement without evidence of necrosis ([facility])  E11.621 Wound cleansing and dressings    L97.516       2. Arterial insufficiency with ischemic ulcer ([facility])  I77.1     L98.499       3. PAD (peripheral artery disease) ([facility])  I73.9       4. Other diabetic neurological complication associated with type 2 diabetes mellitus ([facility])  E11.49       5. Other chronic osteomyelitis of right foot ([facility])  M86.671       6. Cellulitis of toe of right foot  L03.031            Assessment & Plan:  Right second toe cellulitis with underlying osteomyelitis diabetic foot ulcer.  Right posterior full-thickness ankle wound with exposed Achilles tendon  Right lateral ankle ulcer ischemic/diabetic etiology  Diabetic neuropathy  Peripheral arterial disease      -Patient presents with right second toe cellulitis wound probable to bone concern of bone infection/osteomyelitis.  I personally interpreted the right foot x-ray with patient which is consistent with cortical irregularities and loss of bone at the distal phalanx second toe compatible with osteomyelitis given wound probable to bone.  I recommended patient to visit nearby ER for infection workup, upon admission patient will need blood work ESR CRP, right foot and ankle MRI, IV antibiotics as well as vascular  surgery consultation.  Informed patient she is at risk of limb loss given osteomyelitis, exposed Achilles tendon and compromise distal perfusion/PAD.  We discussed possible risks of bacteremia, sepsis and life-threatening events if patient does not go to hospital soon.  Patient expressed understanding and will report to hospital sooner than later.  -Vascular studies reviewed consistent with Diffuse disease noted throughout the femoral-popliteal arteries without significant focal stenosis. Monophasic/ continuous Doppler waveforms in the common femoral artery may be suggestive of more proximal disease. R JOSE 0.43, met pressure and toe pressure 0 mmHg, on the left side JOSE 0.38, met pressure 9 and great toe pressure 19 mmHg.  ABIs are within ischemic disease category.  -Wound care as detailed below  -This was communicated in detail with patient's son-in-law.    No chief complaint on file.          Subjective:   78-year-old female with past medical history as below presents for evaluation of bilateral lower extremity ulcerations complicated by diabetes, peripheral arterial disease and pressure.  Patient reports she feels okay denies nausea vomit fever chills.  Patient's son-in-law reports patient has not been herself lately.  No other complaints at this time.        The following portions of the patient's history were reviewed and updated as appropriate:   Patient Active Problem List   Diagnosis    Type 2 diabetes mellitus with diabetic polyneuropathy (HCC)    Essential hypertension    Anxiety and depression    Medicare annual wellness visit, subsequent    GERD without esophagitis    Reactive airway disease with acute exacerbation    Urinary incontinence    Degenerative lumbar disc    Hypokalemia    Hematuria    Elevated troponin    Lactic acidosis    Hypomagnesemia    Preoperative examination    Bladder neoplasm    Left renal mass    PVD (peripheral vascular disease) (HCC)    Abnormal CT of the chest    COPD, severity  to be determined (HCC)    Tobacco use disorder    Age-related osteoporosis without current pathological fracture    Ankle ulcer, right, with fat layer exposed (HCC)     Past Medical History:   Diagnosis Date    Anxiety     Closed fracture of coccyx (HCC) 2023    Diabetes mellitus (HCC)     GERD (gastroesophageal reflux disease)     Hyperlipidemia     Hypertension     IBS (irritable bowel syndrome)     Shoulder fracture      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;  Surgeon: Cristian Child MD;  Location: [facility];  Service: Urology    FL RETROGRADE PYELOGRAM  2023    HERNIA REPAIR      umbilicus w/ mesh     Social History     Socioeconomic History    Marital status: /Civil Union     Spouse name: Not on file    Number of children: Not on file    Years of education: Not on file    Highest education level: Not on file   Occupational History    Not on file   Tobacco Use    Smoking status: Every Day     Current packs/day: 1.00     Average packs/day: 1 pack/day for 63.1 years (63.1 ttl pk-yrs)     Types: Cigarettes     Start date:     Smokeless tobacco: Never    Tobacco comments:     23 smokes about 3/4 PPD   Vaping Use    Vaping status: Never Used   Substance and Sexual Activity    Alcohol use: Not Currently    Drug use: Never    Sexual activity: Not on file   Other Topics Concern    Not on file   Social History Narrative    Not on file     Social Determinants of Health     Financial Resource Strain: Low Risk  (2023)     Overall Financial Resource Strain (CARDIA)     Difficulty of Paying Living Expenses: Not hard at all   Food Insecurity: No Food Insecurity (8/11/2023)    Hunger Vital Sign     Worried About Running Out of Food in the Last Year: Never true     Ran Out of Food in the Last Year: Never true   Transportation Needs: No Transportation Needs (8/11/2023)    PRAPARE - Transportation     Lack of Transportation (Medical): No     Lack of Transportation (Non-Medical): No   Physical Activity: Not on file   Stress: Not on file   Social Connections: Not on file   Intimate Partner Violence: Not on file   Housing Stability: Low Risk  (8/11/2023)    Housing Stability Vital Sign     Unable to Pay for Housing in the Last Year: No     Number of Places Lived in the Last Year: 1     Unstable Housing in the Last Year: No        Current Outpatient Medications:     acetaminophen (TYLENOL) 325 mg tablet, Take 650 mg by mouth every 6 (six) hours as needed for mild pain, Disp: , Rfl:     albuterol (Ventolin HFA) 90 mcg/act inhaler, Inhale 2 puffs every 6 (six) hours as needed for wheezing, Disp: 18 g, Rfl: 0    AMILoride 5 mg tablet, Take 1 tablet (5 mg total) by mouth daily, Disp: 90 tablet, Rfl: 5    Apoaequorin (Prevagen) 10 MG CAPS, Take by mouth, Disp: , Rfl:     Aspirin (ASPIR-81 PO), take one by mouth daily, Disp: , Rfl:     atorvastatin (LIPITOR) 10 mg tablet, Take 1 tablet (10 mg total) by mouth daily with dinner, Disp: 90 tablet, Rfl: 1    Blood Glucose Monitoring Suppl (ONETOUCH VERIO) w/Device KIT, by Does not apply route 2 (two) times a day, Disp: 1 kit, Rfl: 0    buPROPion (WELLBUTRIN XL) 300 mg 24 hr tablet, Take 1 tablet (300 mg total) by mouth daily, Disp: 90 tablet, Rfl: 0    clonazePAM (KlonoPIN) 1 mg tablet, Take 1 tablet (1 mg total) by mouth every evening, Disp: 30 tablet, Rfl: 3    Fluticasone-Salmeterol (Advair Diskus) 250-50 mcg/dose inhaler, Inhale 1 puff 2 (two) times a day Rinse mouth after use., Disp: 60 blister,  Rfl: 0    glucose blood (OneTouch Verio) test strip, TEST BLOOD GLUCOSE TWO TIMES A DAY, Disp: 100 strip, Rfl: 5    Januvia 100 MG tablet, Take 1 tablet (100 mg total) by mouth daily, Disp: 90 tablet, Rfl: 2    Lancets (OneTouch Delica Plus Hlakey64V) MISC, TEST TWO TIMES A DAY, Disp: 100 each, Rfl: 5    lisinopril (ZESTRIL) 5 mg tablet, Take 1 tablet (5 mg total) by mouth daily, Disp: 90 tablet, Rfl: 5    metFORMIN (GLUCOPHAGE-XR) 500 mg 24 hr tablet, Take 2 tablets (1,000 mg total) by mouth 2 (two) times a day, Disp: 360 tablet, Rfl: 1    Misc. Devices (Carex Coccyx Cushion) MISC, Use in the morning, Disp: 1 each, Rfl: 0    Omega-3 Fatty Acids (FISH OIL PO), , Disp: , Rfl:     pantoprazole (PROTONIX) 40 mg tablet, 40 mg, Disp: , Rfl:     potassium chloride (K-DUR,KLOR-CON) 10 mEq tablet, Take 1 tablet (10 mEq total) by mouth 2 (two) times a day, Disp: 180 tablet, Rfl: 0    pregabalin (LYRICA) 200 MG capsule, TAKE ONE CAPSULE BY MOUTH THREE TIMES A DAY, Disp: 90 capsule, Rfl: 2    solifenacin (VESICARE) 5 mg tablet, Take 1 tablet (5 mg total) by mouth daily, Disp: 90 tablet, Rfl: 0  Family History   Problem Relation Age of Onset    COPD Mother     Emphysema Mother     COPD Father     Emphysema Father       Review of Systems   All other systems reviewed and are negative.    Allergies:  Paroxetine, Adhesive [medical tape], Carafate [sucralfate], Sulfa antibiotics, and Sulfamethoxazole-trimethoprim      Objective:  /78   Pulse 85   Temp 97.7 °F (36.5 °C)   Resp 18     Physical Exam  Vitals reviewed.   Feet:      Right foot:      Skin integrity: Ulcer present.      Left foot:      Skin integrity: Ulcer present.      Comments: Right medial and posterior ankle ulcer noted.  Posterior ulcer with exposed Achilles tendon.  Tendon is fibrotic and necrotic.  Right medial ulcer probable to fascia with fibrotic and necrotic wound base.  Right lateral ulcer ankle with scab.  Right second toe ulcer probable to bone still  tip.  Toe is edematous and positive for erythema. No active drainage.   -Left lateral ankle ulcer with scab.     Please see pictures for complete evaluation.              Wound 01/09/24 Arterial Ulcer Ankle Medial;Right (Active)   Wound Image   01/23/24 1532   Wound Description Yellow;Brown;Slough;Pale;Pink;Granulation tissue 01/23/24 1532   Ani-wound Assessment Pink;Edema 01/23/24 1532   Wound Length (cm) 2.4 cm 01/23/24 1532   Wound Width (cm) 3.2 cm 01/23/24 1532   Wound Depth (cm) 0.2 cm 01/23/24 1532   Wound Surface Area (cm^2) 7.68 cm^2 01/23/24 1532   Wound Volume (cm^3) 1.536 cm^3 01/23/24 1532   Calculated Wound Volume (cm^3) 1.54 cm^3 01/23/24 1532   Change in Wound Size % 15.38 01/23/24 1532   Drainage Amount Moderate 01/23/24 1532   Drainage Description Serous;Brown 01/23/24 1532   Non-staged Wound Description Full thickness 01/23/24 1532   Dressing Status Intact;Old drainage 01/23/24 1532       Wound 01/09/24 Arterial Ulcer Ankle Lateral;Right (Active)   Wound Image   01/09/24 1455   Wound Description Other (Comment);Eschar 01/09/24 1503   Ani-wound Assessment Scaly;Dry 01/23/24 1533   Wound Length (cm) 0.1 cm 01/23/24 1533   Wound Width (cm) 0.1 cm 01/23/24 1533   Wound Depth (cm) 0.1 cm 01/23/24 1533   Wound Surface Area (cm^2) 0.01 cm^2 01/23/24 1533   Wound Volume (cm^3) 0.001 cm^3 01/23/24 1533   Calculated Wound Volume (cm^3) 0 cm^3 01/23/24 1533   Drainage Amount None 01/23/24 1533   Dressing Status Intact 01/09/24 1503       Wound 01/09/24 Arterial Ulcer Ankle Right;Posterior (Active)   Wound Image   01/23/24 1534   Wound Description Black;Yellow;Eschar;Exposed tendon;White;Tan;Slough 01/23/24 1534   Ani-wound Assessment Pink 01/23/24 1534   Wound Length (cm) 5.8 cm 01/23/24 1534   Wound Width (cm) 2.5 cm 01/23/24 1534   Wound Depth (cm) 0.2 cm 01/23/24 1534   Wound Surface Area (cm^2) 14.5 cm^2 01/23/24 1534   Wound Volume (cm^3) 2.9 cm^3 01/23/24 1534   Calculated Wound Volume (cm^3) 2.9  cm^3 01/23/24 1534   Change in Wound Size % -262.5 01/23/24 1534   Drainage Amount Moderate 01/23/24 1534   Drainage Description Serosanguineous 01/23/24 1534   Non-staged Wound Description Full thickness 01/23/24 1534   Dressing Status Intact;Old drainage 01/23/24 1534       Wound 01/09/24 Arterial Ulcer Ankle Left;Lateral (Active)   Wound Image   01/23/24 1533   Wound Description Other (Comment) 01/09/24 1506   Ani-wound Assessment Clean;Dry;Intact 01/23/24 1525   Wound Length (cm) 0 cm 01/23/24 1525   Wound Width (cm) 0 cm 01/23/24 1525   Wound Depth (cm) 0 cm 01/23/24 1525   Wound Surface Area (cm^2) 0 cm^2 01/23/24 1525   Wound Volume (cm^3) 0 cm^3 01/23/24 1525   Calculated Wound Volume (cm^3) 0 cm^3 01/23/24 1525   Drainage Amount None 01/23/24 1525   Dressing Status Other (Comment) 01/09/24 1506       Wound 01/09/24 Diabetic Ulcer Toe D2, second Right (Active)   Enter Daniel score: Daniel Grade 2: Deep ulcer extended to ligament, tendon, joint capsule, bone, or deep fascia without abscess or osteomyelitis (OM) 01/09/24 1459   Wound Image   01/23/24 1531   Wound Description Dry;Tan;Brown 01/23/24 1529   Ani-wound Assessment Dry;Callus;Scaly 01/23/24 1529   Wound Length (cm) 0.1 cm 01/23/24 1529   Wound Width (cm) 0.1 cm 01/23/24 1529   Wound Depth (cm) 0.1 cm 01/23/24 1529   Wound Surface Area (cm^2) 0.01 cm^2 01/23/24 1529   Wound Volume (cm^3) 0.001 cm^3 01/23/24 1529   Calculated Wound Volume (cm^3) 0 cm^3 01/23/24 1529   Drainage Amount None 01/23/24 1529   Dressing Status Intact;Old drainage 01/23/24 1529                         Procedures             Wound Instructions:  Orders Placed This Encounter   Procedures    Wound cleansing and dressings     **Dr. Gann recommends going to [facility].  There is an infection in your right 2nd toe based on the x ray. Dr. Gann will see you in the hospital**      Wound cleansing and dressings       Wash your hands with soap and water.   "Remove old dressing, discard into plastic bag and place in trash.  Cleanse the wound with unscented soap and water prior to applying a clean dressing. Do not use tissue or cotton balls. Do not scrub the wound. Pat dry using gauze.  Shower no Do not get dressing wet, do not soak foot in any water     Right ankle medial, lateral, and posterior wounds   Apply Mupirocin to all wounds.  Cover with Adaptic.  Cover with ABD pads.   Secure with Alicia and tape   Change dressing 3 times per week      Left ankle lateral wound   Apply Mupirocin to all wounds.  Cover with Adaptic.  Cover with gauze.   Secure with Alicia and tape   Change dressing 3 times per week     Right 2nd toe wound   Apply Acticoat 3 to toe wound, cover with gauze.   Secure with tape  Change dressing 3 times per week     Please try to keep Blood Sugars <120     Increase protein in your diet with each meal (eggs, nuts, protein shakes, meat, chicken, greek yogurt)     Standing Status:   Future     Standing Expiration Date:   1/23/2025         Jennifer Gann DPM      Portions of the record may have been created with voice recognition software. Occasional wrong word or \"sound a like\" substitutions may have occurred due to the inherent limitations of voice recognition software. Read the chart carefully and recognize, using context, where substitutions have occurred.      "

## 2024-01-23 NOTE — PATIENT INSTRUCTIONS
Orders Placed This Encounter   Procedures    Wound cleansing and dressings     **Dr. Gann recommends going to [facility].  There is an infection in your right 2nd toe based on the x ray. Dr. Gann will see you in the hospital**      Wound cleansing and dressings       Wash your hands with soap and water.  Remove old dressing, discard into plastic bag and place in trash.  Cleanse the wound with unscented soap and water prior to applying a clean dressing. Do not use tissue or cotton balls. Do not scrub the wound. Pat dry using gauze.  Shower no Do not get dressing wet, do not soak foot in any water     Right ankle medial, lateral, and posterior wounds   Apply Mupirocin to all wounds.  Cover with Adaptic.  Cover with ABD pads.   Secure with Alicia and tape   Change dressing 3 times per week      Left ankle lateral wound   Apply Mupirocin to all wounds.  Cover with Adaptic.  Cover with gauze.   Secure with Alicia and tape   Change dressing 3 times per week     Right 2nd toe wound   Apply Acticoat 3 to toe wound, cover with gauze.   Secure with tape  Change dressing 3 times per week     Please try to keep Blood Sugars <120     Increase protein in your diet with each meal (eggs, nuts, protein shakes, meat, chicken, greek yogurt)     Standing Status:   Future     Standing Expiration Date:   1/23/2025

## 2024-01-24 ENCOUNTER — APPOINTMENT (INPATIENT)
Dept: CT IMAGING | Facility: HOSPITAL | Age: 79
DRG: 638 | End: 2024-01-24
Payer: COMMERCIAL

## 2024-01-24 ENCOUNTER — APPOINTMENT (INPATIENT)
Dept: MRI IMAGING | Facility: HOSPITAL | Age: 79
DRG: 638 | End: 2024-01-24
Payer: COMMERCIAL

## 2024-01-24 LAB
ANION GAP SERPL CALCULATED.3IONS-SCNC: 7 MMOL/L
ATRIAL RATE: 78 BPM
BUN SERPL-MCNC: 10 MG/DL (ref 5–25)
CALCIUM SERPL-MCNC: 10.1 MG/DL (ref 8.4–10.2)
CHLORIDE SERPL-SCNC: 104 MMOL/L (ref 96–108)
CO2 SERPL-SCNC: 26 MMOL/L (ref 21–32)
CREAT SERPL-MCNC: 0.53 MG/DL (ref 0.6–1.3)
ERYTHROCYTE [DISTWIDTH] IN BLOOD BY AUTOMATED COUNT: 12.8 % (ref 11.6–15.1)
GFR SERPL CREATININE-BSD FRML MDRD: 91 ML/MIN/1.73SQ M
GLUCOSE SERPL-MCNC: 113 MG/DL (ref 65–140)
GLUCOSE SERPL-MCNC: 121 MG/DL (ref 65–140)
GLUCOSE SERPL-MCNC: 73 MG/DL (ref 65–140)
GLUCOSE SERPL-MCNC: 91 MG/DL (ref 65–140)
GLUCOSE SERPL-MCNC: 98 MG/DL (ref 65–140)
HCT VFR BLD AUTO: 42.7 % (ref 34.8–46.1)
HGB BLD-MCNC: 13.9 G/DL (ref 11.5–15.4)
MCH RBC QN AUTO: 31.5 PG (ref 26.8–34.3)
MCHC RBC AUTO-ENTMCNC: 32.6 G/DL (ref 31.4–37.4)
MCV RBC AUTO: 97 FL (ref 82–98)
P AXIS: 57 DEGREES
PLATELET # BLD AUTO: 363 THOUSANDS/UL (ref 149–390)
PMV BLD AUTO: 8.8 FL (ref 8.9–12.7)
POTASSIUM SERPL-SCNC: 4.4 MMOL/L (ref 3.5–5.3)
PR INTERVAL: 178 MS
PROCALCITONIN SERPL-MCNC: <0.05 NG/ML
QRS AXIS: -28 DEGREES
QRSD INTERVAL: 102 MS
QT INTERVAL: 356 MS
QTC INTERVAL: 405 MS
RBC # BLD AUTO: 4.41 MILLION/UL (ref 3.81–5.12)
SODIUM SERPL-SCNC: 137 MMOL/L (ref 135–147)
T WAVE AXIS: 80 DEGREES
VENTRICULAR RATE: 78 BPM
WBC # BLD AUTO: 9.8 THOUSAND/UL (ref 4.31–10.16)

## 2024-01-24 PROCEDURE — 82948 REAGENT STRIP/BLOOD GLUCOSE: CPT

## 2024-01-24 PROCEDURE — 75635 CT ANGIO ABDOMINAL ARTERIES: CPT

## 2024-01-24 PROCEDURE — 99223 1ST HOSP IP/OBS HIGH 75: CPT | Performed by: SURGERY

## 2024-01-24 PROCEDURE — 93010 ELECTROCARDIOGRAM REPORT: CPT | Performed by: INTERNAL MEDICINE

## 2024-01-24 PROCEDURE — 84145 PROCALCITONIN (PCT): CPT | Performed by: PHYSICIAN ASSISTANT

## 2024-01-24 PROCEDURE — G1004 CDSM NDSC: HCPCS

## 2024-01-24 PROCEDURE — 73721 MRI JNT OF LWR EXTRE W/O DYE: CPT

## 2024-01-24 PROCEDURE — 99232 SBSQ HOSP IP/OBS MODERATE 35: CPT | Performed by: HOSPITALIST

## 2024-01-24 PROCEDURE — 85027 COMPLETE CBC AUTOMATED: CPT | Performed by: PHYSICIAN ASSISTANT

## 2024-01-24 PROCEDURE — 80048 BASIC METABOLIC PNL TOTAL CA: CPT | Performed by: PHYSICIAN ASSISTANT

## 2024-01-24 PROCEDURE — 73718 MRI LOWER EXTREMITY W/O DYE: CPT

## 2024-01-24 RX ADMIN — ASPIRIN 81 MG: 81 TABLET, COATED ORAL at 09:17

## 2024-01-24 RX ADMIN — PREGABALIN 200 MG: 100 CAPSULE ORAL at 09:17

## 2024-01-24 RX ADMIN — PREGABALIN 200 MG: 100 CAPSULE ORAL at 16:50

## 2024-01-24 RX ADMIN — INSULIN LISPRO 5 UNITS: 100 INJECTION, SOLUTION INTRAVENOUS; SUBCUTANEOUS at 12:12

## 2024-01-24 RX ADMIN — PANTOPRAZOLE SODIUM 40 MG: 40 TABLET, DELAYED RELEASE ORAL at 08:10

## 2024-01-24 RX ADMIN — PIPERACILLIN AND TAZOBACTAM 3.38 G: 3; .375 INJECTION, POWDER, FOR SOLUTION INTRAVENOUS at 09:00

## 2024-01-24 RX ADMIN — AMILORIDE HYDROCLORIDE 5 MG: 5 TABLET ORAL at 11:25

## 2024-01-24 RX ADMIN — CLONAZEPAM 1 MG: 1 TABLET ORAL at 18:12

## 2024-01-24 RX ADMIN — IOHEXOL 120 ML: 350 INJECTION, SOLUTION INTRAVENOUS at 20:25

## 2024-01-24 RX ADMIN — ENOXAPARIN SODIUM 40 MG: 40 INJECTION SUBCUTANEOUS at 09:16

## 2024-01-24 RX ADMIN — OMEGA-3 FATTY ACIDS CAP 1000 MG 1000 MG: 1000 CAP at 09:17

## 2024-01-24 RX ADMIN — SODIUM CHLORIDE 100 ML/HR: 0.9 INJECTION, SOLUTION INTRAVENOUS at 13:47

## 2024-01-24 RX ADMIN — OXYBUTYNIN CHLORIDE 5 MG: 5 TABLET, EXTENDED RELEASE ORAL at 09:17

## 2024-01-24 RX ADMIN — INSULIN LISPRO 5 UNITS: 100 INJECTION, SOLUTION INTRAVENOUS; SUBCUTANEOUS at 17:27

## 2024-01-24 RX ADMIN — PIPERACILLIN AND TAZOBACTAM 3.38 G: 3; .375 INJECTION, POWDER, FOR SOLUTION INTRAVENOUS at 20:13

## 2024-01-24 RX ADMIN — LISINOPRIL 5 MG: 5 TABLET ORAL at 09:19

## 2024-01-24 RX ADMIN — PIPERACILLIN AND TAZOBACTAM 3.38 G: 3; .375 INJECTION, POWDER, FOR SOLUTION INTRAVENOUS at 14:35

## 2024-01-24 RX ADMIN — PREGABALIN 200 MG: 100 CAPSULE ORAL at 22:00

## 2024-01-24 RX ADMIN — FLUTICASONE FUROATE AND VILANTEROL TRIFENATATE 1 PUFF: 200; 25 POWDER RESPIRATORY (INHALATION) at 11:25

## 2024-01-24 RX ADMIN — PIPERACILLIN AND TAZOBACTAM 3.38 G: 3; .375 INJECTION, POWDER, FOR SOLUTION INTRAVENOUS at 02:26

## 2024-01-24 RX ADMIN — BUPROPION HYDROCHLORIDE 300 MG: 150 TABLET, EXTENDED RELEASE ORAL at 09:17

## 2024-01-24 RX ADMIN — INSULIN LISPRO 5 UNITS: 100 INJECTION, SOLUTION INTRAVENOUS; SUBCUTANEOUS at 08:10

## 2024-01-24 RX ADMIN — ATORVASTATIN CALCIUM 10 MG: 10 TABLET, FILM COATED ORAL at 16:50

## 2024-01-24 NOTE — ASSESSMENT & PLAN NOTE
Lab Results   Component Value Date    HGBA1C 6.5 (H) 11/07/2023       Recent Labs     01/23/24  2215 01/24/24  0707 01/24/24  1058   POCGLU 87 91 121       Blood Sugar Average: Last 72 hrs:  (P) [id number]  Target blood sugar for the hospital is 140-180  Blood sugars are stable at this time  Continue Accu-Cheks before meals and at bedtime with sliding scale coverage  Oral hypoglycemics remain on hold  Diabetic neuropathy-continue Lyrica

## 2024-01-24 NOTE — H&P
Formerly [facility], later [facility]  H&P  Name: Lona Cedeno 78 y.o. female I MRN: [id number]  Unit/Bed#: -01 I Date of Admission: 1/23/2024   Date of Service: 1/23/2024 I Hospital Day: 0      Assessment/Plan   * Diabetic foot ulcer with osteomyelitis (HCC)  Assessment & Plan  Patient with right second toe cellulitis and Xrays reviewed w/ Podiatry with cortical irreglarties. Patient sent to ED   IV abx  MRI  ESR 38, CRP 24, follow-up blood cultures  Podiatry consult      Ankle ulcer, right, with fat layer exposed ([facility])  Assessment & Plan  Patient seen at podiatry noted to have right posterior full-thickness ankle wound with exposed Achilles tendon  MRI right ankle  Podiatry consult    Lactic acidosis  Assessment & Plan  IV fluids and monitor  Does not meet sepsis at this time  Was 3.3 on admission, repeat 1.7    Tobacco use disorder  Assessment & Plan  Smokes 1 pack/day  Smoking cessation recommended  Nicotine patch 21 mg    COPD, severity to be determined ([facility])  Assessment & Plan  No exacerbation  Continue home medications  Patient is to have a PFT scheduled as an outpatient     Abnormal CT of the chest  Assessment & Plan  Patient w/ abnormal CT chest 8/11/23. Seen by Pulmonary.  Repeat CT chest was recommended  F/U pulmonary    PAD (peripheral artery disease) ([facility])  Assessment & Plan  Recent arterial Dopplers with diffuse disease throughout the femoral-popliteal arteries without significant focal stenosis  Vascular surgery consult    Bladder neoplasm  Assessment & Plan  Urology follow-up as an outpatient    GERD without esophagitis  Assessment & Plan  Continue PPI    Anxiety and depression  Assessment & Plan  Continue home Wellbutrin daily and clonazepam in the evening    Essential hypertension  Assessment & Plan  Continue home medication with hold parameters    Type 2 diabetes mellitus with diabetic polyneuropathy ([facility])  Assessment & Plan  Lab Results   Component Value Date    HGBA1C 6.5 (H) 11/07/2023  "      No results for input(s): \"POCGLU\" in the last 72 hours.    Blood Sugar Average: Last 72 hrs:  Hold Januvia and metformin with lactic acidosis  Sliding scale insulin coverage with Accu-Cheks  Continue Lyrica for neuropathy         VTE Pharmacologic Prophylaxis: VTE Score: 7 High Risk (Score >/= 5) - Pharmacological DVT Prophylaxis Ordered: enoxaparin (Lovenox). Sequential Compression Devices Ordered.  Code Status: DNR/DNI  Discussion with patient    Anticipated Length of Stay: Patient will be admitted on an inpatient basis with an anticipated length of stay of greater than 2 midnights secondary to IV abx, MRI, specialist input.    Total Time Spent on Date of Encounter in care of patient: 75 mins. This time was spent on one or more of the following: performing physical exam; counseling and coordination of care; obtaining or reviewing history; documenting in the medical record; reviewing/ordering tests, medications or procedures; communicating with other healthcare professionals and discussing with patient's family/caregivers.    Chief Complaint: diabetic ulcer toe and right leg wound    History of Present Illness:  Lona Cedeno is a 78 y.o. female with a PMH of COPD, DM2 w/ neuropathy not in on sulin, HTN, HLD, Tobacco abuse, Anxiety and depression, PAD, bladder neoplasm who presents with diabetic ulcer of toe and right ankle wound with achilles tendon exposed. Patient was seen at Podiatry office today and sent the ED for further evaluation with MRI, labs, IV abx, and Vascular consult. Patient reports wound started in summer, she notes trying to wear low socks, that turned into a blister. The toe she thinks occurred in summer she did something wearing her flip flops. She denies pain. Notes some drainage. Uses a cane to walk.     Review of Systems:  Review of Systems   Constitutional:  Negative for chills and fever.   HENT:  Negative for rhinorrhea, sore throat and trouble swallowing.    Eyes:  Negative for " discharge and redness.   Respiratory:  Negative for cough and shortness of breath.    Cardiovascular:  Negative for chest pain and leg swelling.   Gastrointestinal:  Negative for abdominal pain, diarrhea, nausea and vomiting.   Genitourinary:  Negative for dysuria and hematuria.   Musculoskeletal:  Negative for back pain, myalgias and neck pain.   Skin:  Positive for wound. Negative for rash.   Neurological:  Negative for dizziness, weakness and headaches.   Psychiatric/Behavioral:  Negative for agitation and confusion.        Past Medical and Surgical History:   Past Medical History:   Diagnosis Date    Anxiety     Closed fracture of coccyx (HCC) 2023    Diabetes mellitus ([facility])     GERD (gastroesophageal reflux disease)     Hyperlipidemia     Hypertension     IBS (irritable bowel syndrome)     Shoulder fracture        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 Authorizing Provider   acetaminophen (TYLENOL) 325 mg tablet Take 650 mg by mouth every 6 (six) hours as needed for mild pain    Historical Provider, MD   albuterol (Ventolin HFA) 90 mcg/act inhaler Inhale 2 puffs every 6 (six) hours as needed for wheezing 11/10/23   Vivek Preston DO   AMILoride 5 mg tablet Take 1 tablet (5 mg total) by  mouth daily 10/10/23   Alexander Cespedes MD   Apoaequorin (Prevagen) 10 MG CAPS Take by mouth    Historical Provider, MD   Aspirin (ASPIR-81 PO) take one by mouth daily 1/14/14   Historical Provider, MD   atorvastatin (LIPITOR) 10 mg tablet Take 1 tablet (10 mg total) by mouth daily with dinner 9/27/23   Vivek Preston DO   Blood Glucose Monitoring Suppl (ONETOUCH VERIO) w/Device KIT by Does not apply route 2 (two) times a day 1/7/20   Vivek Preston DO   buPROPion (WELLBUTRIN XL) 300 mg 24 hr tablet Take 1 tablet (300 mg total) by mouth daily 11/28/23   Vivek Preston DO   clonazePAM (KlonoPIN) 1 mg tablet Take 1 tablet (1 mg total) by mouth every evening 8/21/23   Vivek Preston DO   Fluticasone-Salmeterol (Advair Diskus) 250-50 mcg/dose inhaler Inhale 1 puff 2 (two) times a day Rinse mouth after use. 11/10/23 12/10/23  Vivek Preston DO   glucose blood (OneTouch Verio) test strip TEST BLOOD GLUCOSE TWO TIMES A DAY 12/8/23   Vivek Preston DO   Januvia 100 MG tablet Take 1 tablet (100 mg total) by mouth daily 8/21/23   Vivek Preston DO   Lancets (OneTouch Delica Plus Qceccp09V) MISC TEST TWO TIMES A DAY 12/8/23   Vivek Preston DO   lisinopril (ZESTRIL) 5 mg tablet Take 1 tablet (5 mg total) by mouth daily 10/10/23   Alexander Cespedes MD   metFORMIN (GLUCOPHAGE-XR) 500 mg 24 hr tablet Take 2 tablets (1,000 mg total) by mouth 2 (two) times a day 11/10/23   Vivek Preston DO   Misc. Devices (Carex Coccyx Cushion) MISC Use in the morning 5/24/23   ROXANE Garcia   Omega-3 Fatty Acids (FISH OIL PO)  3/24/14   Historical Provider, MD   pantoprazole (PROTONIX) 40 mg tablet 40 mg 10/31/23   Historical Provider, MD   potassium chloride (K-DUR,KLOR-CON) 10 mEq tablet Take 1 tablet (10 mEq total) by mouth 2 (two) times a day 11/28/23   Vivek Preston DO   pregabalin (LYRICA) 200 MG capsule TAKE ONE CAPSULE BY MOUTH THREE TIMES A DAY 8/21/23   Vivek Preston DO   solifenacin (VESICARE) 5  "mg tablet Take 1 tablet (5 mg total) by mouth daily 11/28/23   Vivek Preston, DO     I have reviewed home medications with patient personally.    Allergies:   Allergies   Allergen Reactions    Paroxetine Other (See Comments)     Became suicidal    Adhesive [Medical Tape] Itching and Blisters    Carafate [Sucralfate] Other (See Comments)     Mouth sores, tongue sore    Sulfa Antibiotics Hives and Rash    Sulfamethoxazole-Trimethoprim Hives       Social History:  Marital Status: /Civil Union   Occupation: retired  Patient Pre-hospital Living Situation: With spouse  Patient Pre-hospital Level of Mobility: walks with cane  Patient Pre-hospital Diet Restrictions: none  Substance Use History:   Social History     Substance and Sexual Activity   Alcohol Use Not Currently     Social History     Tobacco Use   Smoking Status Every Day    Current packs/day: 1.00    Average packs/day: 1 pack/day for 63.1 years (63.1 ttl pk-yrs)    Types: Cigarettes    Start date: 1961   Smokeless Tobacco Never   Tobacco Comments    11/14/23 smokes about 3/4 PPD     Social History     Substance and Sexual Activity   Drug Use Never       Family History:  Family History   Problem Relation Age of Onset    COPD Mother     Emphysema Mother     COPD Father     Emphysema Father        Physical Exam:     Vitals:   Blood Pressure: 128/62 (01/23/24 2105)  Pulse: 76 (01/23/24 2105)  Temperature: 99 °F (37.2 °C) (01/23/24 1935)  Respirations: 20 (01/23/24 2105)  Height: 5' 1\" (154.9 cm) (01/23/24 2105)  Weight - Scale: 66.1 kg (145 lb 11.6 oz) (01/23/24 2105)  SpO2: 95 % (01/23/24 2105)    Physical Exam  Vitals reviewed.   Constitutional:       Appearance: Normal appearance.      Comments: Elderly  female in no distress   HENT:      Head: Normocephalic and atraumatic.      Nose: Nose normal.   Eyes:      General:         Right eye: No discharge.         Left eye: No discharge.      Extraocular Movements: Extraocular movements intact.      " Conjunctiva/sclera: Conjunctivae normal.      Comments: Glasses in place   Cardiovascular:      Rate and Rhythm: Normal rate and regular rhythm.   Pulmonary:      Effort: Pulmonary effort is normal. No respiratory distress.      Breath sounds: Normal breath sounds. No wheezing.   Abdominal:      General: Bowel sounds are normal. There is no distension.      Palpations: Abdomen is soft.      Tenderness: There is no abdominal tenderness. There is no guarding.   Musculoskeletal:         General: No swelling or tenderness. Normal range of motion.      Cervical back: Normal range of motion.      Right lower leg: No edema.      Left lower leg: No edema.        Feet:    Feet:      Comments: See images in media  Skin:     General: Skin is warm and dry.      Capillary Refill: Capillary refill takes less than 2 seconds.   Neurological:      General: No focal deficit present.      Mental Status: She is alert and oriented to person, place, and time. Mental status is at baseline.   Psychiatric:         Mood and Affect: Mood normal.         Behavior: Behavior normal.         Thought Content: Thought content normal.         Judgment: Judgment normal.        Additional Data:     Lab Results:  Results from last 7 days   Lab Units 01/23/24 1940   WBC Thousand/uL 12.73*   HEMOGLOBIN g/dL 14.0   HEMATOCRIT % 42.7   PLATELETS Thousands/uL 404*   NEUTROS PCT % 63   LYMPHS PCT % 27   MONOS PCT % 6   EOS PCT % 3     Results from last 7 days   Lab Units 01/23/24  1940   SODIUM mmol/L 135   POTASSIUM mmol/L 4.5   CHLORIDE mmol/L 99   CO2 mmol/L 25   BUN mg/dL 12   CREATININE mg/dL 0.54*   ANION GAP mmol/L 11   CALCIUM mg/dL 10.3*   ALBUMIN g/dL 4.2   TOTAL BILIRUBIN mg/dL 0.21   ALK PHOS U/L 64   ALT U/L 7   AST U/L 10*   GLUCOSE RANDOM mg/dL 120     Results from last 7 days   Lab Units 01/23/24  1940   INR  1.03     Results from last 7 days   Lab Units 01/23/24  2215   POC GLUCOSE mg/dl 87         Results from last 7 days   Lab Units  01/23/24 2223 01/23/24 1940   LACTIC ACID mmol/L 1.7 3.3*   PROCALCITONIN ng/ml  --  <0.05       Lines/Drains:  Invasive Devices       Peripheral Intravenous Line  Duration             Peripheral IV 01/23/24 Right;Ventral (anterior) Forearm <1 day              Drain  Duration             Ureteral Internal Stent Left ureter 6 Fr. 64 days                    Imaging: screw in right tibia, formal read pending  XR foot 3+ views RIGHT   ED Interpretation by Narinder Ely Jr., DO (01/23 2027)   Presumed osteomyelitis of the distal phalanx of the second toe of the right foot.  There is also degenerative changes, and hardware in the distal tibia.      MRI inpatient order    (Results Pending)       EKG and Other Studies Reviewed on Admission:   EKG: NSR. HR 78, reviewed in MUSE personally.    ** Please Note: This note has been constructed using a voice recognition system. **

## 2024-01-24 NOTE — ASSESSMENT & PLAN NOTE
No exacerbation  Continue home medications  Patient is to have a PFT scheduled as an outpatient   Of note, patient continues to smoke-cessation counseling provided, nicotine patch is in place

## 2024-01-24 NOTE — CASE MANAGEMENT
Case Management Progress Note    Patient name Lona Cedeno  Location /-01 MRN [id number]  : 1945 Date 2024       LOS (days): 1  Geometric Mean LOS (GMLOS) (days): 3  Days to GMLOS:2.3        OBJECTIVE:        Current admission status: Inpatient  Preferred Pharmacy:   [id number] [place] [facility] [address] [place] [id number]  Phone: [phone] Fax: [phone]    [facility] [place] - [address]  [address] [facility] [place] [address]  Phone: [phone] Fax: [phone]    Primary Care Provider: Vivek Preston DO    Primary Insurance: [place]  REP  Secondary Insurance:     PROGRESS NOTE:  Pt off the floor for her mri- cm unable to assess, cm will continue to follow.

## 2024-01-24 NOTE — ASSESSMENT & PLAN NOTE
Recent arterial Dopplers with diffuse disease throughout the femoral-popliteal arteries without significant focal stenosis  Vascular surgery consult

## 2024-01-24 NOTE — PLAN OF CARE
Problem: PAIN - ADULT  Goal: Verbalizes/displays adequate comfort level or baseline comfort level  Description: Interventions:  - Encourage patient to monitor pain and request assistance  - Assess pain using appropriate pain scale  - Administer analgesics based on type and severity of pain and evaluate response  - Implement non-pharmacological measures as appropriate and evaluate response  - Consider cultural and social influences on pain and pain management  - Notify physician/advanced practitioner if interventions unsuccessful or patient reports new pain  Outcome: Progressing     Problem: INFECTION - ADULT  Goal: Absence or prevention of progression during hospitalization  Description: INTERVENTIONS:  - Assess and monitor for signs and symptoms of infection  - Monitor lab/diagnostic results  - Monitor all insertion sites, i.e. indwelling lines, tubes, and drains  - Monitor endotracheal if appropriate and nasal secretions for changes in amount and color  - [place] appropriate cooling/warming therapies per order  - Administer medications as ordered  - Instruct and encourage patient and family to use good hand hygiene technique  - Identify and instruct in appropriate isolation precautions for identified infection/condition  Outcome: Progressing     Problem: Knowledge Deficit  Goal: Patient/family/caregiver demonstrates understanding of disease process, treatment plan, medications, and discharge instructions  Description: Complete learning assessment and assess knowledge base.  Interventions:  - Provide teaching at level of understanding  - Provide teaching via preferred learning methods  Outcome: Progressing     Problem: Nutrition/Hydration-ADULT  Goal: Nutrient/Hydration intake appropriate for improving, restoring or maintaining nutritional needs  Description: Monitor and assess patient's nutrition/hydration status for malnutrition. Collaborate with interdisciplinary team and initiate plan and interventions as  ordered.  Monitor patient's weight and dietary intake as ordered or per policy. Utilize nutrition screening tool and intervene as necessary. Determine patient's food preferences and provide high-protein, high-caloric foods as appropriate.     INTERVENTIONS:  - Monitor oral intake, urinary output, labs, and treatment plans  - Assess nutrition and hydration status and recommend course of action  - Evaluate amount of meals eaten  - Assist patient with eating if necessary   - Allow adequate time for meals  - Recommend/ encourage appropriate diets, oral nutritional supplements, and vitamin/mineral supplements  - Order, calculate, and assess calorie counts as needed  - Recommend, monitor, and adjust tube feedings and TPN/PPN based on assessed needs  - Assess need for intravenous fluids  - Provide specific nutrition/hydration education as appropriate  - Include patient/family/caregiver in decisions related to nutrition  Outcome: Progressing

## 2024-01-24 NOTE — ED PROVIDER NOTES
History  Chief Complaint   Patient presents with    Wound Check     Referred by Dr nguyen for possible amputation; wound to right foot     78-year-old female presents emergency department secondary to being evaluated by Dr. Nguyen, and is noted to have a wound to the second toe on the right foot as well as a dorsal foot wound as well as a significant Achilles wound with exposed tendon.  The patient had an ultrasound done which showed significant peripheral vascular disease, and the patient continues to smoke.  Patient is also diabetic.  Patient was sent from the office to the ER to be admitted for surgery.        Prior to Admission Medications   Prescriptions Last Dose Informant Patient Reported? Taking?   AMILoride 5 mg tablet   No No   Sig: Take 1 tablet (5 mg total) by mouth daily   Apoaequorin (Prevagen) 10 MG CAPS   Yes No   Sig: Take by mouth   Aspirin (ASPIR-81 PO)  Self Yes No   Sig: take one by mouth daily   Blood Glucose Monitoring Suppl (ONETOUCH VERIO) w/Device KIT  Self No No   Sig: by Does not apply route 2 (two) times a day   Fluticasone-Salmeterol (Advair Diskus) 250-50 mcg/dose inhaler   No No   Sig: Inhale 1 puff 2 (two) times a day Rinse mouth after use.   Januvia 100 MG tablet   No No   Sig: Take 1 tablet (100 mg total) by mouth daily   Lancets (OneTouch Delica Plus [id number]) MISC   No No   Sig: TEST TWO TIMES A DAY   Misc. Devices (Carex Coccyx Cushion) MISC   No No   Sig: Use in the morning   Omega-3 Fatty Acids (FISH OIL PO)  Self Yes No   acetaminophen (TYLENOL) 325 mg tablet  Self Yes No   Sig: Take 650 mg by mouth every 6 (six) hours as needed for mild pain   albuterol (Ventolin HFA) 90 mcg/act inhaler   No No   Sig: Inhale 2 puffs every 6 (six) hours as needed for wheezing   atorvastatin (LIPITOR) 10 mg tablet   No No   Sig: Take 1 tablet (10 mg total) by mouth daily with dinner   buPROPion (WELLBUTRIN XL) 300 mg 24 hr tablet   No No   Sig: Take 1 tablet (300 mg total) by mouth daily    clonazePAM (KlonoPIN) 1 mg tablet   No No   Sig: Take 1 tablet (1 mg total) by mouth every evening   glucose blood (OneTouch Verio) test strip   No No   Sig: TEST BLOOD GLUCOSE TWO TIMES A DAY   lisinopril (ZESTRIL) 5 mg tablet   No No   Sig: Take 1 tablet (5 mg total) by mouth daily   metFORMIN (GLUCOPHAGE-XR) 500 mg 24 hr tablet   No No   Sig: Take 2 tablets (1,000 mg total) by mouth 2 (two) times a day   pantoprazole (PROTONIX) 40 mg tablet   Yes No   Si mg   potassium chloride (K-DUR,KLOR-CON) 10 mEq tablet   No No   Sig: Take 1 tablet (10 mEq total) by mouth 2 (two) times a day   pregabalin (LYRICA) 200 MG capsule   No No   Sig: TAKE ONE CAPSULE BY MOUTH THREE TIMES A DAY   solifenacin (VESICARE) 5 mg tablet   No No   Sig: Take 1 tablet (5 mg total) by mouth daily      Facility-Administered Medications: None       Past Medical History:   Diagnosis Date    Anxiety     Closed fracture of coccyx (HCC) 2023    Diabetes mellitus (HCC)     GERD (gastroesophageal reflux disease)     Hyperlipidemia     Hypertension     IBS (irritable bowel syndrome)     Shoulder fracture        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    E-Cigarette/Vaping    E-Cigarette Use Never User      E-Cigarette/Vaping Substances    Nicotine No     THC No     CBD No     Flavoring No     Other No     Unknown No      Social History     Tobacco Use    Smoking status: Every Day     Current packs/day: 1.00     Average packs/day: 1 pack/day for 63.1 years (63.1 ttl pk-yrs)     Types: Cigarettes     Start date: 1961    Smokeless tobacco: Never    Tobacco comments:     11/14/23 smokes about 3/4 PPD   Vaping Use    Vaping status: Never Used   Substance Use Topics    Alcohol use: Not Currently    Drug use: Never       Review of Systems   Constitutional:  Negative for chills and fever.   Eyes:  Negative for pain and visual disturbance.   Respiratory:  Negative for cough and shortness of breath.    Cardiovascular:  Negative for chest pain and palpitations.   Gastrointestinal:  Negative for abdominal pain and vomiting.   Genitourinary:  Negative for dysuria and hematuria.   Musculoskeletal:  Negative for arthralgias and back pain.   Skin:  Positive for color change and wound. Negative for rash.   Neurological:  Negative for syncope.   All other systems reviewed and are negative.      Physical Exam  Physical Exam  Vitals and nursing note reviewed.   Constitutional:       General: She is not in acute distress.     Appearance: She is well-developed. She is ill-appearing.   HENT:      Head: Normocephalic and atraumatic.   Eyes:      Conjunctiva/sclera: Conjunctivae normal.   Cardiovascular:      Rate and Rhythm: Normal rate and regular rhythm.      Heart sounds: No murmur heard.  Pulmonary:      Effort: Pulmonary effort is normal. No respiratory distress.      Breath sounds: Normal breath sounds.   Abdominal:      Palpations: Abdomen is soft.      Tenderness: There is no abdominal tenderness.   Musculoskeletal:         General: Deformity and signs of injury present. No swelling.      Cervical back: Neck supple.      Comments: Cannot appreciate pulses on the  right foot.   Skin:     General: Skin is warm and dry.      Capillary Refill: Capillary refill takes less than 2 seconds.      Coloration: Skin is pale.      Findings: Lesion present.      Comments: Patient with cyanotic appearing right foot second toe as well as a circular 2.75 cm ulcerative lesion on the dorsum of the right foot.  There is also exposed Achilles tendon with a large Achilles wound.   Neurological:      Mental Status: She is alert.   Psychiatric:         Mood and Affect: Mood normal.         Vital Signs  ED Triage Vitals   Temperature Pulse Respirations Blood Pressure SpO2   01/23/24 1935 01/23/24 1935 01/23/24 1935 01/23/24 1935 01/23/24 1935   99 °F (37.2 °C) 88 18 134/60 98 %      Temp src Heart Rate Source Patient Position - Orthostatic VS BP Location FiO2 (%)   -- 01/23/24 1935 01/23/24 2105 01/23/24 1935 --    Monitor Lying Left arm       Pain Score       01/23/24 1935       No Pain           Vitals:    01/23/24 1935 01/23/24 2105   BP: 134/60 128/62   Pulse: 88 76   Patient Position - Orthostatic VS:  Lying         Visual Acuity      ED Medications  Medications   insulin lispro (HumaLOG) 100 units/mL subcutaneous injection 5 Units (has no administration in time range)   insulin lispro (HumaLOG) 100 units/mL subcutaneous injection 1-5 Units (has no administration in time range)   insulin lispro (HumaLOG) 100 units/mL subcutaneous injection 1-5 Units (has no administration in time range)   piperacillin-tazobactam (ZOSYN) IVPB 3.375 g (has no administration in time range)   acetaminophen (TYLENOL) tablet 650 mg (has no administration in time range)   ondansetron (ZOFRAN) injection 4 mg (has no administration in time range)   sodium chloride 0.9 % infusion (100 mL/hr Intravenous New Bag 1/23/24 2204)   nicotine (NICODERM CQ) 21 mg/24 hr TD 24 hr patch 1 patch (has no administration in time range)   enoxaparin (LOVENOX) subcutaneous injection 40 mg (has no administration in time range)   albuterol  (PROVENTIL HFA,VENTOLIN HFA) inhaler 2 puff (has no administration in time range)   AMILoride tablet 5 mg (has no administration in time range)   aspirin (ECOTRIN LOW STRENGTH) EC tablet 81 mg (has no administration in time range)   atorvastatin (LIPITOR) tablet 10 mg (has no administration in time range)   buPROPion (WELLBUTRIN XL) 24 hr tablet 300 mg (has no administration in time range)   clonazePAM (KlonoPIN) tablet 1 mg (has no administration in time range)   fluticasone-vilanterol 200-25 mcg/actuation 1 puff (has no administration in time range)   lisinopril (ZESTRIL) tablet 5 mg (has no administration in time range)   fish oil capsule 1,000 mg (has no administration in time range)   pantoprazole (PROTONIX) EC tablet 40 mg (has no administration in time range)   pregabalin (LYRICA) capsule 200 mg (has no administration in time range)   oxybutynin (DITROPAN-XL) 24 hr tablet 5 mg (has no administration in time range)   piperacillin-tazobactam (ZOSYN) IVPB 3.375 g (3.375 g Intravenous New Bag 1/23/24 1953)       Diagnostic Studies  Results Reviewed       Procedure Component Value Units Date/Time    C-reactive protein [[id number]]  (Abnormal) Collected: 01/23/24 1940    Lab Status: Final result Specimen: Blood from Arm, Right Updated: 01/23/24 2054     CRP 24.2 mg/L     Sedimentation rate, automated [[id number]]  (Abnormal) Collected: 01/23/24 1940    Lab Status: Final result Specimen: Blood from Arm, Right Updated: 01/23/24 2042     Sed Rate 38 mm/hour     Procalcitonin [[id number]]  (Normal) Collected: 01/23/24 1940    Lab Status: Final result Specimen: Blood from Arm, Right Updated: 01/23/24 2012     Procalcitonin <0.05 ng/ml     Lactic acid [[id number]]  (Abnormal) Collected: 01/23/24 1940    Lab Status: Final result Specimen: Blood from Arm, Right Updated: 01/23/24 2011     LACTIC ACID 3.3 mmol/L     Narrative:      Slightly Hemolyzed:Results may be affected.  Result may be elevated if tourniquet was used during  collection.    Lactic acid 2 Hours [[id number]]     Lab Status: No result Specimen: Blood     HS Troponin 0hr (reflex protocol) [[id number]]  (Normal) Collected: 01/23/24 1940    Lab Status: Final result Specimen: Blood from Arm, Right Updated: 01/23/24 2010     hs TnI 0hr 4 ng/L     HS Troponin I 2hr [[id number]]     Lab Status: No result Specimen: Blood     HS Troponin I 4hr [[id number]]     Lab Status: No result Specimen: Blood     Comprehensive metabolic panel [[id number]]  (Abnormal) Collected: 01/23/24 1940    Lab Status: Final result Specimen: Blood from Arm, Right Updated: 01/23/24 2009     Sodium 135 mmol/L      Potassium 4.5 mmol/L      Chloride 99 mmol/L      CO2 25 mmol/L      ANION GAP 11 mmol/L      BUN 12 mg/dL      Creatinine 0.54 mg/dL      Glucose 120 mg/dL      Calcium 10.3 mg/dL      AST 10 U/L      ALT 7 U/L      Alkaline Phosphatase 64 U/L      Total Protein 7.3 g/dL      Albumin 4.2 g/dL      Total Bilirubin 0.21 mg/dL      eGFR 91 ml/min/1.73sq m     Narrative:      [employer] guidelines for Chronic Kidney Disease (CKD):     Stage 1 with normal or high GFR (GFR > 90 mL/min/1.73 square meters)    Stage 2 Mild CKD (GFR = 60-89 mL/min/1.73 square meters)    Stage 3A Moderate CKD (GFR = 45-59 mL/min/1.73 square meters)    Stage 3B Moderate CKD (GFR = 30-44 mL/min/1.73 square meters)    Stage 4 Severe CKD (GFR = 15-29 mL/min/1.73 square meters)    Stage 5 End Stage CKD (GFR <15 mL/min/1.73 square meters)  Note: GFR calculation is accurate only with a steady state creatinine    CK [[id number]]  (Normal) Collected: 01/23/24 1940    Lab Status: Final result Specimen: Blood from Arm, Right Updated: 01/23/24 2009     Total CK 40 U/L     Protime-INR [[id number]]  (Normal) Collected: 01/23/24 1940    Lab Status: Final result Specimen: Blood from Arm, Right Updated: 01/23/24 2000     Protime 13.6 seconds      INR 1.03    APTT [[id number]]  (Normal) Collected: 01/23/24 1940    Lab Status:  Final result Specimen: Blood from Arm, Right Updated: 01/23/24 2000     PTT 29 seconds     Blood culture #1 [[id number]] Collected: 01/23/24 1952    Lab Status: In process Specimen: Blood from Hand, Left Updated: 01/23/24 1957    Wound culture and Gram stain [[id number]] Collected: 01/23/24 1945    Lab Status: In process Specimen: Wound from Foot, Right Updated: 01/23/24 1957    CBC and differential [[id number]]  (Abnormal) Collected: 01/23/24 1940    Lab Status: Final result Specimen: Blood from Arm, Right Updated: 01/23/24 1947     WBC 12.73 Thousand/uL      RBC 4.45 Million/uL      Hemoglobin 14.0 g/dL      Hematocrit 42.7 %      MCV 96 fL      MCH 31.5 pg      MCHC 32.8 g/dL      RDW 12.7 %      MPV 8.9 fL      Platelets 404 Thousands/uL      nRBC 0 /100 WBCs      Neutrophils Relative 63 %      Immat GRANS % 0 %      Lymphocytes Relative 27 %      Monocytes Relative 6 %      Eosinophils Relative 3 %      Basophils Relative 1 %      Neutrophils Absolute 8.04 Thousands/µL      Immature Grans Absolute 0.03 Thousand/uL      Lymphocytes Absolute 3.49 Thousands/µL      Monocytes Absolute 0.76 Thousand/µL      Eosinophils Absolute 0.35 Thousand/µL      Basophils Absolute 0.06 Thousands/µL     Blood culture #2 [[id number]] Collected: 01/23/24 1940    Lab Status: In process Specimen: Blood from Arm, Right Updated: 01/23/24 1945                   XR foot 3+ views RIGHT   ED Interpretation by Narinder Ely Jr., DO (01/23 2027)   Presumed osteomyelitis of the distal phalanx of the second toe of the right foot.  There is also degenerative changes, and hardware in the distal tibia.      MRI inpatient order    (Results Pending)              Procedures  ECG 12 Lead Documentation Only    Date/Time: 1/23/2024 8:25 PM    Performed by: Narinder Ely Jr., DO  Authorized by: Narinder Ely Jr., DO    ECG reviewed by me, the ED Provider: yes    Patient location:  ED  Comments:      EKG shows a sinus rhythm at 70 beats a  minute with a left axis deviation.  There is a left anterior fascicular block pattern noted.  There is no other definitive acute ST or T wave changes appreciated.           ED Course  ED Course as of 01/23/24 2204 Tue Jan 23, 2024 1951 Per Podiatry: Needs mri, start iv abx and consult vascular and podiatry when admitted please. Thanks   2017 LACTIC ACID(!!): 3.3   2029 Although patient's lactate is elevated.  The patient does not meet sepsis criteria as she only has 1 SIRS at this time.                                             Medical Decision Making  78-year-old female presents emergency department secondary to visit by Dr. Gann where she relates that the patient is suffering from chronic osteomyelitis of the right foot, second toe, and also has an open wound of the back of the heel with exposed Achilles tendon.  The patient is not febrile but admits to minimal pain but does have a history of diabetes and diabetic neuropathy.  The patient recently also had a vascular ultrasound which showed very decreased flow to the right foot.  The patient will be admitted to the medical floor for IV antibiotics and possible surgery.  The patient was given a dose of IV vancomycin while in the emergency department.  Patient's lactate was elevated however the patient did not have more than 1 SIRS criteria.  Patient accepted to the medicine service for further evaluation.  Patient to have further testing tomorrow with an MRI, vascular consult and more IV antibiotics.  Potential surgery.  Patient admitted.    Amount and/or Complexity of Data Reviewed  Labs: ordered. Decision-making details documented in ED Course.  Radiology: ordered and independent interpretation performed.    Risk  Decision regarding hospitalization.             Disposition  Final diagnoses:   Osteomyelitis (HCC)   Peripheral arterial disease (HCC)   Cellulitis     Time reflects when diagnosis was documented in both MDM as applicable and the Disposition  within this note       Time User Action Codes Description Comment    1/23/2024  8:27 PM Narinder Ely Add [M86.9] Osteomyelitis ([facility])     1/23/2024  8:28 PM Narinder Ely Add [I73.9] Peripheral arterial disease ([facility])     1/23/2024  8:28 PM Narinder Ely Add [L03.90] Cellulitis     1/23/2024  8:34 PM Pam Ambrocio Add [L97.312] Ankle ulcer, right, with fat layer exposed ([facility])           ED Disposition       ED Disposition   Admit    Condition   Stable    Date/Time   Tue Jan 23, 2024  8:28 PM    Comment   Case was discussed with Pam Cox and the patient's admission status was agreed to be Admission Status: inpatient status to the service of Dr. Pretty .               Follow-up Information    None         Current Discharge Medication List        CONTINUE these medications which have NOT CHANGED    Details   acetaminophen (TYLENOL) 325 mg tablet Take 650 mg by mouth every 6 (six) hours as needed for mild pain      albuterol (Ventolin HFA) 90 mcg/act inhaler Inhale 2 puffs every 6 (six) hours as needed for wheezing  Qty: 18 g, Refills: 0    Comments: Substitution to a formulary equivalent within the same pharmaceutical class is authorized.  Associated Diagnoses: COPD (chronic obstructive pulmonary disease) ([facility])      AMILoride 5 mg tablet Take 1 tablet (5 mg total) by mouth daily  Qty: 90 tablet, Refills: 5    Associated Diagnoses: Essential hypertension      Apoaequorin (Prevagen) 10 MG CAPS Take by mouth      Aspirin (ASPIR-81 PO) take one by mouth daily      atorvastatin (LIPITOR) 10 mg tablet Take 1 tablet (10 mg total) by mouth daily with dinner  Qty: 90 tablet, Refills: 1    Associated Diagnoses: Hypoxia      Blood Glucose Monitoring Suppl (ONETOUCH VERIO) w/Device KIT by Does not apply route 2 (two) times a day  Qty: 1 kit, Refills: 0    Associated Diagnoses: Type 2 diabetes mellitus with diabetic polyneuropathy, without long-term current use of insulin ([facility])      buPROPion (WELLBUTRIN XL) 300  mg 24 hr tablet Take 1 tablet (300 mg total) by mouth daily  Qty: 90 tablet, Refills: 0    Associated Diagnoses: Anxiety and depression      clonazePAM (KlonoPIN) 1 mg tablet Take 1 tablet (1 mg total) by mouth every evening  Qty: 30 tablet, Refills: 3    Associated Diagnoses: Anxiety and depression      Fluticasone-Salmeterol (Advair Diskus) 250-50 mcg/dose inhaler Inhale 1 puff 2 (two) times a day Rinse mouth after use.  Qty: 60 blister, Refills: 0    Comments: Substitution to a formulary equivalent within the same pharmaceutical class is authorized.  Associated Diagnoses: COPD (chronic obstructive pulmonary disease) ([facility])      glucose blood (OneTouch Verio) test strip TEST BLOOD GLUCOSE TWO TIMES A DAY  Qty: 100 strip, Refills: 5    Associated Diagnoses: Type 2 diabetes mellitus with diabetic polyneuropathy, without long-term current use of insulin ([facility])      Januvia 100 MG tablet Take 1 tablet (100 mg total) by mouth daily  Qty: 90 tablet, Refills: 2    Associated Diagnoses: Essential hypertension; Type 2 diabetes mellitus with diabetic polyneuropathy, without long-term current use of insulin ([facility])      Lancets (OneTouch Delica Plus [id number]) MISC TEST TWO TIMES A DAY  Qty: 100 each, Refills: 5    Associated Diagnoses: Type 2 diabetes mellitus with diabetic polyneuropathy, without long-term current use of insulin ([facility])      lisinopril (ZESTRIL) 5 mg tablet Take 1 tablet (5 mg total) by mouth daily  Qty: 90 tablet, Refills: 5    Associated Diagnoses: Essential hypertension      metFORMIN (GLUCOPHAGE-XR) 500 mg 24 hr tablet Take 2 tablets (1,000 mg total) by mouth 2 (two) times a day  Qty: 360 tablet, Refills: 1    Associated Diagnoses: Uncontrolled type 2 diabetes mellitus with hyperglycemia ([facility])      Misc. Devices (Carex Coccyx Cushion) MISC Use in the morning  Qty: 1 each, Refills: 0    Associated Diagnoses: Closed fracture of coccyx, initial encounter ([facility])      Omega-3 Fatty Acids (FISH OIL PO)        pantoprazole (PROTONIX) 40 mg tablet 40 mg      potassium chloride (K-DUR,KLOR-CON) 10 mEq tablet Take 1 tablet (10 mEq total) by mouth 2 (two) times a day  Qty: 180 tablet, Refills: 0    Associated Diagnoses: Mixed stress and urge urinary incontinence; Hematuria, unspecified type      pregabalin (LYRICA) 200 MG capsule TAKE ONE CAPSULE BY MOUTH THREE TIMES A DAY  Qty: 90 capsule, Refills: 2    Associated Diagnoses: Type 2 diabetes mellitus with diabetic polyneuropathy, without long-term current use of insulin (HCC)      solifenacin (VESICARE) 5 mg tablet Take 1 tablet (5 mg total) by mouth daily  Qty: 90 tablet, Refills: 0    Associated Diagnoses: Mixed stress and urge urinary incontinence             No discharge procedures on file.    PDMP Review       None            ED Provider  Electronically Signed by             Narinder Ely Jr.,   01/23/24 [id number]

## 2024-01-24 NOTE — ASSESSMENT & PLAN NOTE
Patient seen at podiatry noted to have right posterior full-thickness ankle wound with exposed Achilles tendon  MRI right ankle  Podiatry consult

## 2024-01-24 NOTE — ASSESSMENT & PLAN NOTE
Patient with right second toe cellulitis and Xrays reviewed w/ Podiatry with cortical irreglarties. Patient sent to ED   IV abx  MRI  ESR 38, CRP 24, follow-up blood cultures  Podiatry consult

## 2024-01-24 NOTE — PLAN OF CARE
Problem: Prexisting or High Potential for Compromised Skin Integrity  Goal: Skin integrity is maintained or improved  Description: INTERVENTIONS:  - Identify patients at risk for skin breakdown  - Assess and monitor skin integrity  - Assess and monitor nutrition and hydration status  - Monitor labs   - Assess for incontinence   - Turn and reposition patient  - Assist with mobility/ambulation  - Relieve pressure over bony prominences  - Avoid friction and shearing  - Provide appropriate hygiene as needed including keeping skin clean and dry  - Evaluate need for skin moisturizer/barrier cream  - Collaborate with interdisciplinary team   - Patient/family teaching  - Consider wound care consult   1/24/2024 0235 by Luz Maria Jacobs, PAULINA  Outcome: Progressing  1/23/2024 2316 by Luz Maria Jacobs RN  Outcome: Progressing     Problem: PAIN - ADULT  Goal: Verbalizes/displays adequate comfort level or baseline comfort level  Description: Interventions:  - Encourage patient to monitor pain and request assistance  - Assess pain using appropriate pain scale  - Administer analgesics based on type and severity of pain and evaluate response  - Implement non-pharmacological measures as appropriate and evaluate response  - Consider cultural and social influences on pain and pain management  - Notify physician/advanced practitioner if interventions unsuccessful or patient reports new pain  Outcome: Progressing     Problem: INFECTION - ADULT  Goal: Absence or prevention of progression during hospitalization  Description: INTERVENTIONS:  - Assess and monitor for signs and symptoms of infection  - Monitor lab/diagnostic results  - Monitor all insertion sites, i.e. indwelling lines, tubes, and drains  - Monitor endotracheal if appropriate and nasal secretions for changes in amount and color  - Lee appropriate cooling/warming therapies per order  - Administer medications as ordered  - Instruct and encourage patient and family to  use good hand hygiene technique  - Identify and instruct in appropriate isolation precautions for identified infection/condition  Outcome: Progressing  Goal: Absence of fever/infection during neutropenic period  Description: INTERVENTIONS:  - Monitor WBC    Outcome: Progressing     Problem: SAFETY ADULT  Goal: Patient will remain free of falls  Description: INTERVENTIONS:  - Educate patient/family on patient safety including physical limitations  - Instruct patient to call for assistance with activity   - Consult OT/PT to assist with strengthening/mobility   - Keep Call bell within reach  - Keep bed low and locked with side rails adjusted as appropriate  - Keep care items and personal belongings within reach  - Initiate and maintain comfort rounds  - Make Fall Risk Sign visible to staff  - Offer Toileting every 4 Hours, in advance of need  - Initiate/Maintain bedalarm  - Obtain necessary fall risk management equipment:  - Apply yellow socks and bracelet for high fall risk patients  - Consider moving patient to room near nurses station  Outcome: Progressing  Goal: Maintain or return to baseline ADL function  Description: INTERVENTIONS:  -  Assess patient's ability to carry out ADLs; assess patient's baseline for ADL function and identify physical deficits which impact ability to perform ADLs (bathing, care of mouth/teeth, toileting, grooming, dressing, etc.)  - Assess/evaluate cause of self-care deficits   - Assess range of motion  - Assess patient's mobility; develop plan if impaired  - Assess patient's need for assistive devices and provide as appropriate  - Encourage maximum independence but intervene and supervise when necessary  - Involve family in performance of ADLs  - Assess for home care needs following discharge   - Consider OT consult to assist with ADL evaluation and planning for discharge  - Provide patient education as appropriate  Outcome: Progressing  Goal: Maintains/Returns to pre admission  functional level  Description: INTERVENTIONS:  - Perform AM-PAC 6 Click Basic Mobility/ Daily Activity assessment daily.  - Set and communicate daily mobility goal to care team and patient/family/caregiver.   - Collaborate with rehabilitation services on mobility goals if consulted  - Perform Range of Motion 2 times a day.  - Reposition patient every prn q2  hours.  - Dangle patient 2 times a day  - Stand patient 2 times a day  - Ambulate patient 2 times a day  - Out of bed to chair 2 times a day   - Out of bed for meals 2 times a day  - Out of bed for toileting  - Record patient progress and toleration of activity level   Outcome: Progressing     Problem: DISCHARGE PLANNING  Goal: Discharge to home or other facility with appropriate resources  Description: INTERVENTIONS:  - Identify barriers to discharge w/patient and caregiver  - Arrange for needed discharge resources and transportation as appropriate  - Identify discharge learning needs (meds, wound care, etc.)  - Arrange for interpretive services to assist at discharge as needed  - Refer to Case Management Department for coordinating discharge planning if the patient needs post-hospital services based on physician/advanced practitioner order or complex needs related to functional status, cognitive ability, or social support system  Outcome: Progressing     Problem: Knowledge Deficit  Goal: Patient/family/caregiver demonstrates understanding of disease process, treatment plan, medications, and discharge instructions  Description: Complete learning assessment and assess knowledge base.  Interventions:  - Provide teaching at level of understanding  - Provide teaching via preferred learning methods  Outcome: Progressing     Problem: Nutrition/Hydration-ADULT  Goal: Nutrient/Hydration intake appropriate for improving, restoring or maintaining nutritional needs  Description: Monitor and assess patient's nutrition/hydration status for malnutrition. Collaborate with  interdisciplinary team and initiate plan and interventions as ordered.  Monitor patient's weight and dietary intake as ordered or per policy. Utilize nutrition screening tool and intervene as necessary. Determine patient's food preferences and provide high-protein, high-caloric foods as appropriate.     INTERVENTIONS:  - Monitor oral intake, urinary output, labs, and treatment plans  - Assess nutrition and hydration status and recommend course of action  - Evaluate amount of meals eaten  - Assist patient with eating if necessary   - Allow adequate time for meals  - Recommend/ encourage appropriate diets, oral nutritional supplements, and vitamin/mineral supplements  - Order, calculate, and assess calorie counts as needed  - Recommend, monitor, and adjust tube feedings and TPN/PPN based on assessed needs  - Assess need for intravenous fluids  - Provide specific nutrition/hydration education as appropriate  - Include patient/family/caregiver in decisions related to nutrition  Outcome: Progressing     Problem: SKIN/TISSUE INTEGRITY - ADULT  Goal: Skin Integrity remains intact(Skin Breakdown Prevention)  Description: Assess:  -Perform Brooks assessment every shift  -Clean and moisturize skin every daily  -Inspect skin when repositioning, toileting, and assisting with ADLS  -Assess under medical devices such as masimo/dressings every shift  -Assess extremities for adequate circulation and sensation     Bed Management:  -Have minimal linens on bed & keep smooth, unwrinkled  -Change linens as needed when moist or perspiring  -Avoid sitting or lying in one position for more than 2 hours while in bed  -Keep HOB at 30degrees     Toileting:  -Offer bedside commode  -Assess for incontinence every 4  -Use incontinent care products after each incontinent episode such as     Activity:  -Mobilize patient 2 times a day  -Encourage activity and walks on unit  -Encourage or provide ROM exercises   -Turn and reposition patient every 2  Hours  -Use appropriate equipment to lift or move patient in bed  -Instruct/ Assist with weight shifting every 2hrs when out of bed in chair  -Consider limitation of chair time 2 hour intervals    Skin Care:  -Avoid use of baby powder, tape, friction and shearing, hot water or constrictive clothing  -Relieve pressure over bony prominences using alevyn  -Do not massage red bony areas    Next Steps:  -Teach patient strategies to minimize risks such as    -Consider consults to  interdisciplinary teams such as wound  Outcome: Progressing  Goal: Incision(s), wounds(s) or drain site(s) healing without S/S of infection  Description: INTERVENTIONS  - Assess and document dressing, incision, wound bed, drain sites and surrounding tissue  - Provide patient and family education  - Perform skin care/dressing changes every day  Outcome: Progressing

## 2024-01-24 NOTE — CONSULTS
"Consultation - Vascular Surgery   Lona Cedeno 78 y.o. female MRN: [id number]  Unit/Bed#: -01 Encounter: [id number]    Assessment/Plan     Assessment:  79yo female PMH HTN, HLD, DM, neuropathy, current smoker presents with multiple wounds of the right foot   -LEADs 1/22: \"RIGHT LOWER LIMB: Diffuse disease noted throughout the femoral-popliteal arteries without significant focal stenosis. Monophasic/ continuous Doppler waveforms in the common femoral artery may be suggestive of more proximal disease. Ankle/Brachial index:   0.43  which is in the ischemic disease category. PVR/ PPG tracings are heavily attenuated. Metatarsal pressure of  0 mmHg. Great toe pressure of 0 mmHg  LEFT LOWER LIMB: Diffuse disease noted throughout the femoral-popliteal arteries without significant focal stenosis. Monophasic/ continuous Doppler waveforms in the common femoral artery may be suggestive of more proximal disease. Ankle/Brachial index:   0.38  which is in the ischemic disease category. PVR/ PPG tracings are heavily attenuated. Metatarsal pressure of 9 mmHg. Great toe pressure of 19 mmHg, below the healing range.\"    -patient has right second toe open wound, right medial ankle wound, right posterior ankle open wound with tendon exposed; R PT pulse with doppler   -afebrile, VSS   -no leukocytosis   -Hgb 13.9, stable   -elytes unremarkable    Plan:  -MRI pending; vascular plan pending results and discussion with podiatry regarding limb salvage of the right foot  -continue IV abx  -continue ASA/statin  -local wound care to right foot; appreciate podiatry recs  -discussed with on call vascular surgeon Dr. Maddy Scott; rest of vascular surgery plan per attending attestation    History of Present Illness     HPI:  Lona Cedeno is a 78 y.o. female who presents with right foot wounds. Patient states that she has been dealing with wounds on her right foot since last summer. The wounds do not cause her pain. She does ambulate and " has recently noticed pain in her calf with walking. It does not prevent her from walking. She does not have pain at rest. She had not seen anyone about the wounds on her foot until her daughter encouraged her to see Dr Gann with Podiatry. She was seen  in the office and debridement was performed. She was seen again  and it was recommended the patient come tot the ED for evaluation with concerns for osteomyelitis. Patient has been a 1.5 pack smoker for 63 years. She has never seen a vascular surgeon or had intervention previously.     Inpatient consult to Vascular Surgery  Consult performed by: Sandra King PA-C  Consult ordered by: Pam Ambrocio PA-C          Review of Systems   Constitutional: Negative.    HENT: Negative.     Respiratory: Negative.     Cardiovascular:  Positive for leg swelling.   Gastrointestinal: Negative.    Genitourinary: Negative.    Musculoskeletal: Negative.    Skin:  Positive for wound.   Neurological: Negative.    Psychiatric/Behavioral: Negative.         Historical Information   Past Medical History:   Diagnosis Date    Anxiety     Closed fracture of coccyx (HCC) 2023    Diabetes mellitus ([facility])     GERD (gastroesophageal reflux disease)     Hyperlipidemia     Hypertension     IBS (irritable bowel syndrome)     Shoulder fracture      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;  Surgeon: Cristian Child MD;  Location: [facility];  Service: Urology    FL RETROGRADE PYELOGRAM  2023     "HERNIA REPAIR      umbilicus w/ mesh     Social History   Social History     Substance and Sexual Activity   Alcohol Use Not Currently     Social History     Substance and Sexual Activity   Drug Use Never     E-Cigarette/Vaping    E-Cigarette Use Never User      E-Cigarette/Vaping Substances    Nicotine No     THC No     CBD No     Flavoring No     Other No     Unknown No      Social History     Tobacco Use   Smoking Status Every Day    Current packs/day: 1.00    Average packs/day: 1 pack/day for 63.1 years (63.1 ttl pk-yrs)    Types: Cigarettes    Start date: 1961   Smokeless Tobacco Never   Tobacco Comments    11/14/23 smokes about 3/4 PPD     Family History: non-contributory    Meds/Allergies   all current active meds have been reviewed  Allergies   Allergen Reactions    Paroxetine Other (See Comments)     Became suicidal    Adhesive [Medical Tape] Itching and Blisters    Carafate [Sucralfate] Other (See Comments)     Mouth sores, tongue sore    Sulfa Antibiotics Hives and Rash    Sulfamethoxazole-Trimethoprim Hives       Objective   First Vitals:   Blood Pressure: 134/60 (01/23/24 1935)  Pulse: 88 (01/23/24 1935)  Temperature: 99 °F (37.2 °C) (01/23/24 1935)  Temp Source: Oral (01/23/24 2105)  Respirations: 18 (01/23/24 1935)  Height: 5' 1\" (154.9 cm) (01/23/24 2105)  Weight - Scale: 66.1 kg (145 lb 11.6 oz) (01/23/24 2105)  SpO2: 98 % (01/23/24 1935)    Current Vitals:   Blood Pressure: 135/60 (01/24/24 0708)  Pulse: 82 (01/24/24 0708)  Temperature: (!) 97.2 °F (36.2 °C) (01/24/24 0708)  Temp Source: Oral (01/23/24 2105)  Respirations: 20 (01/23/24 2105)  Height: 5' 1\" (154.9 cm) (01/23/24 2105)  Weight - Scale: 66.1 kg (145 lb 11.6 oz) (01/23/24 2105)  SpO2: 94 % (01/24/24 0708)      Intake/Output Summary (Last 24 hours) at 1/24/2024 0924  Last data filed at 1/24/2024 0501  Gross per 24 hour   Intake 1215 ml   Output --   Net 1215 ml       Invasive Devices       Peripheral Intravenous Line  Duration          "    Peripheral IV 01/23/24 Right;Ventral (anterior) Forearm <1 day              Drain  Duration             Ureteral Internal Stent Left ureter 6 Fr. 65 days                    Physical Exam  Constitutional:       Appearance: Normal appearance.   HENT:      Head: Normocephalic and atraumatic.      Nose: Nose normal.      Mouth/Throat:      Mouth: Mucous membranes are moist.      Pharynx: Oropharynx is clear.   Eyes:      Conjunctiva/sclera: Conjunctivae normal.      Pupils: Pupils are equal, round, and reactive to light.   Cardiovascular:      Rate and Rhythm: Normal rate and regular rhythm.      Pulses: Normal pulses.   Pulmonary:      Effort: Pulmonary effort is normal.   Abdominal:      General: Abdomen is flat.      Palpations: Abdomen is soft.      Tenderness: There is no abdominal tenderness.   Musculoskeletal:         General: Normal range of motion.      Comments: Right foot wounds present. Right second toe, right medial ankle, and right posterior ankle. Nontender. R PT pulse with doppler. Left foot without wounds. Unable to palpate left DP/PT.    Skin:     General: Skin is warm and dry.   Neurological:      General: No focal deficit present.      Mental Status: She is alert.   Psychiatric:         Mood and Affect: Mood normal.         Lab Results: CBC:   Lab Results   Component Value Date    WBC 9.80 01/24/2024    HGB 13.9 01/24/2024    HCT 42.7 01/24/2024    MCV 97 01/24/2024     01/24/2024    RBC 4.41 01/24/2024    MCH 31.5 01/24/2024    MCHC 32.6 01/24/2024    RDW 12.8 01/24/2024    MPV 8.8 (L) 01/24/2024    NRBC 0 01/23/2024   , CMP:   Lab Results   Component Value Date    SODIUM 137 01/24/2024    K 4.4 01/24/2024     01/24/2024    CO2 26 01/24/2024    BUN 10 01/24/2024    CREATININE 0.53 (L) 01/24/2024    CALCIUM 10.1 01/24/2024    AST 10 (L) 01/23/2024    ALT 7 01/23/2024    ALKPHOS 64 01/23/2024    EGFR 91 01/24/2024       Counseling / Coordination of Care  Total floor / unit time spent  today 20 minutes.  Greater than 50% of total time was spent with the patient and / or family counseling and / or coordination of care.      Sandra King PA-C

## 2024-01-24 NOTE — PROGRESS NOTES
[facility]  Progress Note  Name: Lona Cedeno I  MRN: [id number]  Unit/Bed#: -01 I Date of Admission: 1/23/2024   Date of Service: 1/24/2024  Hospital Day: 1    Assessment/Plan   * Diabetic foot ulcer with osteomyelitis (HCC)  Assessment & Plan  Patient with right second toe cellulitis  X-ray right foot-Extensive erosive changes and cortical irregularity involving the second distal phalanx concerning for osteomyelitis. Soft tissue swelling of the second toe is noted.   MRI right foot has been ordered and is yet to be completed  Continue IV Zosyn day 2  Will consult ID to follow along  Formal podiatry, and vascular surgery consultation is pending      Ankle ulcer, right, with fat layer exposed ([facility])  Assessment & Plan  X-ray right foot-Large soft tissue wound superficial to the Achilles tendon.   Formal podiatry evaluation is pending  MRI foot to be completed  Continue local wound care    Type 2 diabetes mellitus with diabetic polyneuropathy ([facility])  Assessment & Plan  Lab Results   Component Value Date    HGBA1C 6.5 (H) 11/07/2023       Recent Labs     01/23/24  2215 01/24/24  0707 01/24/24  1058   POCGLU 87 91 121       Blood Sugar Average: Last 72 hrs:  (P) [id number]  Target blood sugar for the hospital is 140-180  Blood sugars are stable at this time  Continue Accu-Cheks before meals and at bedtime with sliding scale coverage  Oral hypoglycemics remain on hold  Diabetic neuropathy-continue Lyrica      Essential hypertension  Assessment & Plan  Blood pressure is well-controlled  Continue amiloride, and lisinopril    PAD (peripheral artery disease) ([facility])  Assessment & Plan  Recent arterial Dopplers with diffuse disease throughout the femoral-popliteal arteries without significant focal stenosis  Vascular surgical consultation is pending  Continue aspirin, and Lipitor    COPD, severity to be determined ([facility])  Assessment & Plan  No exacerbation  Continue home  medications  Patient is to have a PFT scheduled as an outpatient   Of note, patient continues to smoke-cessation counseling provided, nicotine patch is in place    Anxiety and depression  Assessment & Plan  Continue home Wellbutrin daily and clonazepam in the evening    GERD without esophagitis  Assessment & Plan  Continue PPI    Bladder neoplasm  Assessment & Plan  Urology follow-up as an outpatient    Lactic acidosis  Assessment & Plan  IV fluids and monitor  Does not meet sepsis at this time  Was 3.3 on admission, repeat 1.7               VTE Pharmacologic Prophylaxis: VTE Score: 7 High Risk (Score >/= 5) - Pharmacological DVT Prophylaxis Ordered: enoxaparin (Lovenox). Sequential Compression Devices Ordered.    Mobility:   Basic Mobility Inpatient Raw Score: 19  JH-HLM Goal: 6: Walk 10 steps or more  JH-HLM Achieved: 6: Walk 10 steps or more  HLM Goal achieved. Continue to encourage appropriate mobility.    Patient Centered Rounds: I performed bedside rounds with nursing staff today.   Discussions with Specialists or Other Care Team Provider: Podiatry, vascular surgery    Education and Discussions with Family / Patient: Patient declined call to .     Total Time Spent on Date of Encounter in care of patient: 40 mins. This time was spent on one or more of the following: performing physical exam; counseling and coordination of care; obtaining or reviewing history; documenting in the medical record; reviewing/ordering tests, medications or procedures; communicating with other healthcare professionals and discussing with patient's family/caregivers.    Current Length of Stay: 1 day(s)  Current Patient Status: Inpatient   Certification Statement: The patient will continue to require additional inpatient hospital stay due to need for continued IV antibiotics, and further management of osteomyelitis  Discharge Plan: Anticipate discharge in 48-72 hrs to discharge location to be determined pending rehab  evaluations.    Code Status: Level 3 - DNAR and DNI    Subjective:   Patient seen, resting in bed, no complaints    Objective:     Vitals:   Temp (24hrs), Av.2 °F (36.8 °C), Min:97.2 °F (36.2 °C), Max:99 °F (37.2 °C)    Temp:  [97.2 °F (36.2 °C)-99 °F (37.2 °C)] 97.2 °F (36.2 °C)  HR:  [76-88] 82  Resp:  [18-20] 20  BP: (128-135)/(60-78) 135/60  SpO2:  [93 %-98 %] 93 %  Body mass index is 27.53 kg/m².     Input and Output Summary (last 24 hours):     Intake/Output Summary (Last 24 hours) at 2024 1256  Last data filed at 2024 0501  Gross per 24 hour   Intake 1215 ml   Output --   Net 1215 ml       Physical Exam:   Physical Exam  Constitutional:       General: She is not in acute distress.     Appearance: Normal appearance. She is normal weight. She is not ill-appearing.   HENT:      Head: Normocephalic and atraumatic.      Nose: Nose normal.      Mouth/Throat:      Mouth: Mucous membranes are moist.   Eyes:      Extraocular Movements: Extraocular movements intact.      Pupils: Pupils are equal, round, and reactive to light.   Cardiovascular:      Rate and Rhythm: Normal rate and regular rhythm.      Pulses: Normal pulses.      Heart sounds: Normal heart sounds. No murmur heard.     No friction rub. No gallop.   Pulmonary:      Effort: Pulmonary effort is normal. No respiratory distress.      Breath sounds: Normal breath sounds. No wheezing, rhonchi or rales.   Abdominal:      General: There is no distension.      Palpations: Abdomen is soft. There is no mass.      Tenderness: There is no abdominal tenderness.      Hernia: No hernia is present.   Musculoskeletal:         General: No swelling or tenderness. Normal range of motion.      Cervical back: Normal range of motion and neck supple. No rigidity.      Right lower leg: No edema.      Left lower leg: No edema.      Comments: Right foot dressing is in place, dry, and intact   Skin:     General: Skin is warm.      Capillary Refill: Capillary refill  takes less than 2 seconds.      Findings: No erythema or rash.   Neurological:      General: No focal deficit present.      Mental Status: She is alert and oriented to person, place, and time. Mental status is at baseline.      Cranial Nerves: No cranial nerve deficit.      Motor: No weakness.   Psychiatric:         Mood and Affect: Mood normal.         Behavior: Behavior normal.          Additional Data:     Labs:  Results from last 7 days   Lab Units 01/24/24  0505 01/23/24  1940   WBC Thousand/uL 9.80 12.73*   HEMOGLOBIN g/dL 13.9 14.0   HEMATOCRIT % 42.7 42.7   PLATELETS Thousands/uL 363 404*   NEUTROS PCT %  --  63   LYMPHS PCT %  --  27   MONOS PCT %  --  6   EOS PCT %  --  3     Results from last 7 days   Lab Units 01/24/24  0505 01/23/24  1940   SODIUM mmol/L 137 135   POTASSIUM mmol/L 4.4 4.5   CHLORIDE mmol/L 104 99   CO2 mmol/L 26 25   BUN mg/dL 10 12   CREATININE mg/dL 0.53* 0.54*   ANION GAP mmol/L 7 11   CALCIUM mg/dL 10.1 10.3*   ALBUMIN g/dL  --  4.2   TOTAL BILIRUBIN mg/dL  --  0.21   ALK PHOS U/L  --  64   ALT U/L  --  7   AST U/L  --  10*   GLUCOSE RANDOM mg/dL 113 120     Results from last 7 days   Lab Units 01/23/24  1940   INR  1.03     Results from last 7 days   Lab Units 01/24/24  1058 01/24/24  0707 01/23/24  2215   POC GLUCOSE mg/dl 121 91 87         Results from last 7 days   Lab Units 01/24/24  0505 01/23/24  2223 01/23/24  1940   LACTIC ACID mmol/L  --  1.7 3.3*   PROCALCITONIN ng/ml <0.05  --  <0.05       Lines/Drains:  Invasive Devices       Peripheral Intravenous Line  Duration             Peripheral IV 01/23/24 Right;Ventral (anterior) Forearm <1 day              Drain  Duration             Ureteral Internal Stent Left ureter 6 Fr. 65 days                          Imaging: Reviewed radiology reports from this admission including: xray(s) x-ray right foot-concerning for second toe osteomyelitis    Recent Cultures (last 7 days):   Results from last 7 days   Lab Units 01/23/24 1952  01/23/24 1945 01/23/24 1940   BLOOD CULTURE  Received in Microbiology Lab. Culture in Progress.  --  Received in Microbiology Lab. Culture in Progress.   GRAM STAIN RESULT   --  No Polys or Bacteria seen  --        Last 24 Hours Medication List:   Current Facility-Administered Medications   Medication Dose Route Frequency Provider Last Rate    acetaminophen  650 mg Oral Q4H PRN Pam Ambrocio PA-C      albuterol  2 puff Inhalation Q6H PRN Pam Ambrocio PA-C      AMILoride  5 mg Oral Daily Pam Ambrocio PA-C      aspirin  81 mg Oral Daily Pam Ambrocio PA-C      atorvastatin  10 mg Oral Daily With Dinner Pam Ambrocio PA-C      buPROPion  300 mg Oral Daily Pam Ambrocio PA-C      clonazePAM  1 mg Oral QPM Pam Ambrocio PA-C      enoxaparin  40 mg Subcutaneous Daily Pam Ambrocio PA-C      fish oil  1,000 mg Oral Daily Pam Ambrocio PA-C      fluticasone-vilanterol  1 puff Inhalation Daily Pam Ambrocio PA-C      insulin lispro  1-5 Units Subcutaneous TID AC Pam Ambrocio PA-C      insulin lispro  1-5 Units Subcutaneous HS Pam Ambrocio PA-C      insulin lispro  5 Units Subcutaneous TID With Meals Pam Ambrocio PA-C      lisinopril  5 mg Oral Daily Pam Ambrocio PA-C      nicotine  1 patch Transdermal Daily Pam Ambrocio PA-C      ondansetron  4 mg Intravenous Q6H PRN Pam Ambrocio PA-C      oxybutynin  5 mg Oral Daily Pam Ambrocio PA-C      pantoprazole  40 mg Oral Early Morning Pam Ambrocio PA-C      piperacillin-tazobactam  3.375 g Intravenous Q6H Pam Ambrocio PA-C      pregabalin  200 mg Oral TID Pam Ambrocio PA-C      sodium chloride  100 mL/hr Intravenous Continuous Pam Ambrocio PA-C 100 mL/hr (01/23/24 2204)        Today, Patient Was Seen By: Claudine Pretty MD    **Please  Note: This note may have been constructed using a voice recognition system.**

## 2024-01-24 NOTE — ASSESSMENT & PLAN NOTE
Patient w/ abnormal CT chest 8/11/23. Seen by Pulmonary.  Repeat CT chest was recommended  F/U pulmonary

## 2024-01-24 NOTE — UTILIZATION REVIEW
Initial Clinical Review    Admission: Date/Time/Statement:   Admission Orders (From admission, onward)       Ordered        01/23/24 2028  INPATIENT ADMISSION  Once                          Orders Placed This Encounter   Procedures    INPATIENT ADMISSION     Standing Status:   Standing     Number of Occurrences:   1     Order Specific Question:   Level of Care     Answer:   Med Surg [16]     Order Specific Question:   Estimated length of stay     Answer:   More than 2 Midnights     Order Specific Question:   Certification     Answer:   I certify that inpatient services are medically necessary for this patient for a duration of greater than two midnights. See H&P and MD Progress Notes for additional information about the patient's course of treatment.     ED Arrival Information       Expected   -    Arrival   1/23/2024 19:24    Acuity   Urgent              Means of arrival   Walk-In    Escorted by   Family Member    Service   Hospitalist    Admission type   Emergency              Arrival complaint   Dr Nguyen ref toe infection             Chief Complaint   Patient presents with    Wound Check     Referred by Dr nguyen for possible amputation; wound to right foot       Initial Presentation: 78 y.o. female with a PMH of COPD, DM2 w/ neuropathy not in on sulin, HTN, HLD, Tobacco abuse, Anxiety and depression, PAD, bladder neoplasm who presents with diabetic ulcer of toe and right ankle wound with achilles tendon exposed. Patient was seen at Podiatry office today and sent the ED for further evaluation with MRI, labs, IV abx, and Vascular consult. Patient reports wound started in summer, she notes trying to wear low socks, that turned into a blister. The toe she thinks occurred in summer she did something wearing her flip flops. She denies pain. Notes some drainage. Uses a cane to walk. Plan: Inpatient admission for evaluation and treatment of diabetic foot ulcer with osteomyelitis, right ankle ulcer, lactic acidosis,  tobacco abuse, COPD, abnormal CT of the chest, PAD, GERD, bladder neoplasm, anxiety with depression, HTN, DM: IV Zosyn, MRI, follow blood cultures, Podiatry consult, IV fluids, nicotine patch, Vascular Surgery consult, continue PPI, Wellbutrin, hold Januvia and metformin, continue Lyrica and start SSI.     Date: 1/24   Day 2:     Vascular Surgery consult: MRI pending; vascular plan pending results and discussion with podiatry regarding limb salvage of the right foot. IV antibiotics. Continue ASA and statin. Local wound care.     Internal medicine: X-ray right foot-Extensive erosive changes and cortical irregularity involving the second distal phalanx concerning for osteomyelitis. Soft tissue swelling of the second toe is noted. MRI right foot pending. Continue IV Zosyn. ID consult. Podiatry consult. Local wound care. Continue Lyrica. SSI. Continue ASA and Lipitor. Nicotine patch. Wellbutrin and PPI.    Date: 1/25    Day 3: Has surpassed a 2nd midnight with active treatments and services, which include IV antibiotics and ID consult.      ED Triage Vitals   Temperature Pulse Respirations Blood Pressure SpO2   01/23/24 1935 01/23/24 1935 01/23/24 1935 01/23/24 1935 01/23/24 1935   99 °F (37.2 °C) 88 18 134/60 98 %      Temp Source Heart Rate Source Patient Position - Orthostatic VS BP Location FiO2 (%)   01/23/24 2105 01/23/24 1935 01/23/24 2105 01/23/24 1935 --   Oral Monitor Lying Left arm       Pain Score       01/23/24 1935       No Pain          Wt Readings from Last 1 Encounters:   01/23/24 66.1 kg (145 lb 11.6 oz)     Additional Vital Signs:     Date/Time Temp Pulse Resp BP MAP (mmHg) SpO2 O2 Device   01/24/24 15:10:53 98.8 °F (37.1 °C) 76 16 107/50 69 95 % --   01/24/24 0800 -- -- -- -- -- 93 % None (Room air)   01/24/24 07:08:37 97.2 °F (36.2 °C) Abnormal  82 -- 135/60 85 94 % --   01/23/24 2105 98.8 °F (37.1 °C) 76 20 128/62 -- 95 % None (Room air)     Pertinent Labs/Diagnostic Test Results:   MRI ankle/heel  right  wo contrast   Final Result by Bjorn King MD (01/24 5729)   1. Medial ankle skin ulceration with mild reactive marrow edema at the medial malleolus without T1 replacement signal at this time. Findings are low suspicion for osteomyelitis.   2. Posterior ankle skin ulceration with thinning of the adjacent Achilles tendon and mild critical zone tendinosis. There is no evidence of tear with an intact insertion.   4. Chronic healed posterior and medial malleoli are fractures with screw fixation of a distal fibular fracture, the latter incompletely evaluated due to susceptibility artifacts.      Workstation performed: [id number]         MRI foot/forefoot toest right wo contrast   Final Result by Bjorn King MD (01/24 1441)   Second toe distal skin ulceration with cellulitis as well as resorptive changes at the second distal tuft. The residual second distal phalanx exhibits increased T2 signal with T1 replacement signal concerning for osteomyelitis. There is also marrow edema    second proximal phalanx without T1 replacement signal at this time; therefore, low suspicion for osteomyelitis.      The study was marked in EPIC for immediate notification.      Workstation performed: [id number]         XR foot 3+ views RIGHT   ED Interpretation by Narinder Ely Jr., DO (01/23 2027)   Presumed osteomyelitis of the distal phalanx of the second toe of the right foot.  There is also degenerative changes, and hardware in the distal tibia.      Final Result by Shaq Lundy DO (01/24 0932)      Extensive erosive changes and cortical irregularity involving the second distal phalanx concerning for osteomyelitis. Soft tissue swelling of the second toe is noted.      Large soft tissue wound superficial to the Achilles tendon.      Resident: COLIN TYSON I, the attending radiologist, have reviewed the images and agree with the final report above.      Workstation performed: [id number]           1/23  EKG:  Normal sinus rhythm  Normal ECG  When compared with ECG of 11-AUG-2023 04:26,  Premature ventricular complexes are no longer Present      Results from last 7 days   Lab Units 01/24/24  0505 01/23/24 1940   WBC Thousand/uL 9.80 12.73*   HEMOGLOBIN g/dL 13.9 14.0   HEMATOCRIT % 42.7 42.7   PLATELETS Thousands/uL 363 404*   NEUTROS ABS Thousands/µL  --  8.04*         Results from last 7 days   Lab Units 01/24/24  0505 01/23/24 1940   SODIUM mmol/L 137 135   POTASSIUM mmol/L 4.4 4.5   CHLORIDE mmol/L 104 99   CO2 mmol/L 26 25   ANION GAP mmol/L 7 11   BUN mg/dL 10 12   CREATININE mg/dL 0.53* 0.54*   EGFR ml/min/1.73sq m 91 91   CALCIUM mg/dL 10.1 10.3*     Results from last 7 days   Lab Units 01/23/24 1940   AST U/L 10*   ALT U/L 7   ALK PHOS U/L 64   TOTAL PROTEIN g/dL 7.3   ALBUMIN g/dL 4.2   TOTAL BILIRUBIN mg/dL 0.21     Results from last 7 days   Lab Units 01/24/24  1058 01/24/24  0707 01/23/24 2215   POC GLUCOSE mg/dl 121 91 87     Results from last 7 days   Lab Units 01/24/24  0505 01/23/24 1940   GLUCOSE RANDOM mg/dL 113 120         Results from last 7 days   Lab Units 01/23/24 1940   CK TOTAL U/L 40     Results from last 7 days   Lab Units 01/23/24 2223 01/23/24 1940   HS TNI 0HR ng/L  --  4   HS TNI 2HR ng/L 4  --    HSTNI D2 ng/L 0  --          Results from last 7 days   Lab Units 01/23/24  1940   PROTIME seconds 13.6   INR  1.03   PTT seconds 29         Results from last 7 days   Lab Units 01/24/24  0505 01/23/24 1940   PROCALCITONIN ng/ml <0.05 <0.05     Results from last 7 days   Lab Units 01/23/24  2223 01/23/24 1940   LACTIC ACID mmol/L 1.7 3.3*           Results from last 7 days   Lab Units 01/23/24 1940   CRP mg/L 24.2*   SED RATE mm/hour 38*           Results from last 7 days   Lab Units 01/23/24 1952 01/23/24 1945 01/23/24 1940   BLOOD CULTURE  Received in Microbiology Lab. Culture in Progress.  --  Received in Microbiology Lab. Culture in Progress.   GRAM STAIN RESULT   --  No  Polys or Bacteria seen  --          ED Treatment:   Medication Administration from 01/23/2024 1924 to 01/23/2024 2108         Date/Time Order Dose Route Action     01/23/2024 1953 EST piperacillin-tazobactam (ZOSYN) IVPB 3.375 g 3.375 g Intravenous New Bag          Past Medical History:   Diagnosis Date    Anxiety     Closed fracture of coccyx (HCC) 05/24/2023    Diabetes mellitus (HCC)     GERD (gastroesophageal reflux disease)     Hyperlipidemia     Hypertension     IBS (irritable bowel syndrome)     Shoulder fracture      Present on Admission:   Tobacco use disorder   Lactic acidosis   Ankle ulcer, right, with fat layer exposed (HCC)   COPD, severity to be determined (HCC)   Type 2 diabetes mellitus with diabetic polyneuropathy (HCC)   PAD (peripheral artery disease) (HCC)   Abnormal CT of the chest   Diabetic foot ulcer with osteomyelitis (HCC)   GERD without esophagitis   Essential hypertension   Bladder neoplasm   Anxiety and depression      Admitting Diagnosis: Cellulitis [L03.90]  Osteomyelitis (HCC) [M86.9]  Peripheral arterial disease (HCC) [I73.9]  Wound discharge [T14.8XXA]  Ankle ulcer, right, with fat layer exposed (HCC) [L97.312]  Age/Sex: 78 y.o. female  Admission Orders:  Scheduled Medications:  AMILoride, 5 mg, Oral, Daily  aspirin, 81 mg, Oral, Daily  atorvastatin, 10 mg, Oral, Daily With Dinner  buPROPion, 300 mg, Oral, Daily  clonazePAM, 1 mg, Oral, QPM  enoxaparin, 40 mg, Subcutaneous, Daily  fish oil, 1,000 mg, Oral, Daily  fluticasone-vilanterol, 1 puff, Inhalation, Daily  insulin lispro, 1-5 Units, Subcutaneous, TID AC  insulin lispro, 1-5 Units, Subcutaneous, HS  insulin lispro, 5 Units, Subcutaneous, TID With Meals  lisinopril, 5 mg, Oral, Daily  nicotine, 1 patch, Transdermal, Daily  oxybutynin, 5 mg, Oral, Daily  pantoprazole, 40 mg, Oral, Early Morning  piperacillin-tazobactam, 3.375 g, Intravenous, Q6H  pregabalin, 200 mg, Oral, TID      Continuous IV Infusions:  sodium chloride, 100  mL/hr, Intravenous, Continuous      PRN Meds:  acetaminophen, 650 mg, Oral, Q4H PRN  albuterol, 2 puff, Inhalation, Q6H PRN  ondansetron, 4 mg, Intravenous, Q6H PRN        IP CONSULT TO VASCULAR SURGERY  IP CONSULT TO PODIATRY  IP CONSULT TO INFECTIOUS DISEASES    Network Utilization Review Department  ATTENTION: Please call with any questions or concerns to [phone] and carefully listen to the prompts so that you are directed to the right person. All voicemails are confidential.   For Discharge needs, contact Care Management DC Support Team at [phone] opt. 2  Send all requests for admission clinical reviews, approved or denied determinations and any other requests to dedicated fax number below belonging to the campus where the patient is receiving treatment. List of dedicated fax numbers for the Facilities:  FACILITY NAME UR FAX NUMBER   ADMISSION DENIALS (Administrative/Medical Necessity) [phone]   DISCHARGE SUPPORT TEAM (NETWORK) [phone]   PARENT CHILD HEALTH (Maternity/NICU/Pediatrics) [phone]   General acute hospital [phone]   [facility] [phone]   [facility] [phone]   [facility] [phone]   [facility] [phone]   [facility] [phone]   [facility] [phone]   [facility] [phone]   [facility] [phone]   [facility] [phone]   [facility] [phone]

## 2024-01-24 NOTE — ASSESSMENT & PLAN NOTE
Recent arterial Dopplers with diffuse disease throughout the femoral-popliteal arteries without significant focal stenosis  Vascular surgical consultation is pending  Continue aspirin, and Lipitor

## 2024-01-24 NOTE — CASE MANAGEMENT
Case Management Assessment & Discharge Planning Note    Patient name Lona Cedeno  Location /-01 MRN [id number]  : 1945 Date 2024       Current Admission Date: 2024  Current Admission Diagnosis:Diabetic foot ulcer with osteomyelitis (HCC)   Patient Active Problem List    Diagnosis Date Noted    Diabetic foot ulcer with osteomyelitis (HCC) 2024    Ankle ulcer, right, with fat layer exposed (HCC) 2023    Age-related osteoporosis without current pathological fracture 2023    Abnormal CT of the chest 10/17/2023    COPD, severity to be determined ([facility]) 10/17/2023    Tobacco use disorder 10/17/2023    PAD (peripheral artery disease) ([facility]) 10/10/2023    Bladder neoplasm 2023    Left renal mass 2023    Lactic acidosis 2023    Hypomagnesemia 2023    Degenerative lumbar disc 2023    Urinary incontinence 2023    GERD without esophagitis 10/16/2019    Medicare annual wellness visit, subsequent 2019    Essential hypertension 02/15/2019    Anxiety and depression 02/15/2019    Type 2 diabetes mellitus with diabetic polyneuropathy ([facility]) 2019      LOS (days): 1  Geometric Mean LOS (GMLOS) (days): 3  Days to GMLOS:2.2     OBJECTIVE:    Risk of Unplanned Readmission Score: 12.9         Current admission status: Inpatient  Referral Reason: Other (d/c  planning)    Preferred Pharmacy:   [id number] [address] [address]  Phone: [phone] Fax: [phone]    [place], [place] - [address]  [address] [facility] [place] [address]  Phone: [phone] Fax: [phone]    Primary Care Provider: Vivek Preston DO    Primary Insurance: HDF  REP  Secondary Insurance:     ASSESSMENT:  Active Health Care Proxies       Carmelo Cedeno Health Care Representative - Spouse   Primary Phone: [phone] (Home)                 Advance  Directives  Does patient have a Health Care POA?: Yes (family needs to bring in paperwork)  Does patient have Advance Directives?: Yes (family needs to bring in paperwork)         Readmission Root Cause  30 Day Readmission: No    Patient Information  Admitted from:: Home  Mental Status: Alert  During Assessment patient was accompanied by: Not accompanied during assessment  Assessment information provided by:: Patient  Primary Caregiver: Self  Support Systems: Spouse/significant other  County of Residence: [place]  What city do you live in?: [place]  Home entry access options. Select all that apply.: Stairs  Number of steps to enter home.: 9  Do the steps have railings?: Yes (2 rails)  Type of Current Residence: [facility]  Living Arrangements: Lives w/ Spouse/significant other  Is patient a ?: No    Activities of Daily Living Prior to Admission  Functional Status: Independent  Completes ADLs independently?: Yes  Ambulates independently?: Yes  Does patient use assisted devices?: Yes  Assisted Devices (DME) used: Straight Cane, Bedside Commode  Does patient currently own DME?: Yes  What DME does the patient currently own?: Straight Cane, Walker, Other (Comment) (seat in the shower , pulse ox, bp cuff)  Does patient have a history of Outpatient Therapy (PT/OT)?: Yes  Does the patient have a history of Short-Term Rehab?: No  Does patient have a history of HHC?: Yes (pt unsure of the agency)  Does patient currently have HHC?: No         Patient Information Continued  Income Source: Pension/alf  Does patient have prescription coverage?: Yes ([employer])  Does patient receive dialysis treatments?: No  Does patient have a history of substance abuse?: No  Does patient have a history of Mental Health Diagnosis?: Yes (depression/anxiety)  Is patient receiving treatment for mental health?: Yes (medication from pcp)  Has patient received inpatient treatment related to mental health in the last 2 years?:  No         Means of Transportation  Means of Transport to Appts:: Drives Self      Housing Stability: Low Risk  (1/24/2024)    Housing Stability Vital Sign     Unable to Pay for Housing in the Last Year: No     Number of Places Lived in the Last Year: 1     Unstable Housing in the Last Year: No   Food Insecurity: No Food Insecurity (1/24/2024)    Hunger Vital Sign     Worried About Running Out of Food in the Last Year: Never true     Ran Out of Food in the Last Year: Never true   Transportation Needs: No Transportation Needs (1/24/2024)    PRAPARE - Transportation     Lack of Transportation (Medical): No     Lack of Transportation (Non-Medical): No   Utilities: Not At Risk (1/24/2024)    [facility] Utilities     Threatened with loss of utilities: No       DISCHARGE DETAILS:    Discharge planning discussed with:: patient & spouse was calledat 14:33pm  Freedom of Choice: Yes  Comments - Freedom of Choice: pr denies any d/c needs - cm will continue to follow  CM contacted family/caregiver?: Yes             Contacts  Patient Contacts: Carmelo Cedeno  Relationship to Patient:: Family (spouse)  Contact Method: Phone  Phone Number: [phone]  Reason/Outcome: Discharge Planning                        Treatment Team Recommendation:  (d/c plan tbd- tbd)

## 2024-01-24 NOTE — UTILIZATION REVIEW
NOTIFICATION OF INPATIENT ADMISSION   AUTHORIZATION REQUEST   SERVICING FACILITY:   [place] [address]  Tax ID: [id number]  NPI: [id number]   ATTENDING PROVIDER:  Attending Name and NPI#: Claudine Pretty Md [[id number]]  Address: [address] [place] [address]  Phone: [phone]     ADMISSION INFORMATION:  Place of Service: Inpatient Acute [facility]  Place of Service Code: 21  Inpatient Admission Date/Time: 1/23/24  8:28 PM  Discharge Date/Time: No discharge date for patient encounter.  Admitting Diagnosis Code/Description:  Cellulitis [L03.90]  Osteomyelitis (HCC) [M86.9]  Peripheral arterial disease (HCC) [I73.9]  Wound discharge [T14.8XXA]  Ankle ulcer, right, with fat layer exposed (HCC) [L97.312]     UTILIZATION REVIEW CONTACT:  Rebecca Goldberg Utilization   Network Utilization Review Department  Phone: [phone]  Fax [phone]  Email: [email or web address]  Contact for approvals/pending authorizations, clinical reviews, and discharge.     PHYSICIAN ADVISORY SERVICES:  Medical Necessity Denial & Akvj-ji-Jtcm Review  Phone: [phone]  Fax: [phone]  Email: [email or web address]     DISCHARGE SUPPORT TEAM:  For Patients Discharge Needs & Updates  Phone: [phone] opt. 2 Fax: [phone]  Email: [email or web address]       [facility]  ACUTE REHAB UNIT  SPEECH/LANGUAGE PATHOLOGY      [x] Daily           [x] Discharge    Patient:Xavier Felix      :1935  XCX:[id number]  Rehab Dx/Hx: Acute cerebrovascular accident (CVA) ([facility] [address] [I63.9]  Acute cerebrovascular accident (CVA) ([facility] [address] [I63.9]   No Known Allergies  Precautions: Sit up for all meals and thereafter for 30 minutes and Eat with small bites (1/2 tsp; 1 tsp); Restrictions/Precautions: General Precautions, Fall Risk          Home Situation/IADL:   Social/Functional History  Lives With: Alone  Type of Home: Apartment  Home Layout: One level  Home Access: Elevator  Bathroom Shower/Tub: Tub/Shower unit, Shower chair with back  Bathroom Toilet: Standard  Bathroom Equipment: Grab bars in shower  Bathroom Accessibility: Walker accessible  Home Equipment: Cane(Uses cane for fxl mobility)  Receives Help From: Friend(s)  ADL Assistance: Independent  Homemaking Assistance: Needs assistance  Homemaking Responsibilities: Yes  Ambulation Assistance: Independent  Transfer Assistance: Independent  Active : No  Patient's  Info: Hired help or friends to drive to [employer]  Education: Bachelors degree  Occupation: Retired  Type of occupation: (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   He appeared very knowledgable about music and music theory. )  IADL Comments: Neighbors/friends assist with IADLs. (+) med mgmt  Additional Comments: Pt sleeps on flat bed at baseline      Date of Admit: 4/10/2020  Room #: 1027/1027-A     ST Number of Minutes/Billable Intervention  Cog/Memory Deficits 15   Aphasia/Language     Dysarthria/Speech     Apraxia/Speech     Dysphagia/Swallowing     Group     Other    TOTAL Minutes Billed  15              Date: 2020  Day of ARU Week:  4       SLP Individual Minutes  Time In: 0945  Time Out: 1000  Minutes: 15     Variance/Reason:  [] Refusal due to   [] Medical hold/reason  [] Illness   [] Off Unit for test/procedure  [] Extra time needed to complete task  [] Other (specify)    Activity completed: Pt completed review of learned safety and problem solved given situations    Pain: 3  Current Diet: No diet orders on file  Subjective: Pt alert and sitting up in chair. Goals and POC: Co-treats where appropriate with PT or OT to facilitate patient goals in functional tasks. LTG                           Short-term Goals  Timeframe for Short-term Goals: 3x/week x2 weeks 30 mins min  LTG: Improve problem solving and memory for implementation and carryover of strategies for safe return home . Partially met, continue--pt with visual perceptual deficts, cognitive impairments with poor insight and awareness, reduced memory and problem solving, reduced attn. Goal 1: Pt will  be given memory and visual strategies to assist with carryover of learned tasks in fx with min cues and 80% acc. 50% with mod cues; spaced retrieval for tasks showed limited carryover after 1 min delay. Goal 2: Pt will demonstrate improved insight and awareness of limitations for problem solving in fx via implelmentation of strategies with min cues with 75% acc. Max to mod cues 50%     The [employer] (KPT)provides an accurate indication of current judgment skill; however, it is not designed to make definitive cognitive diagnoses. The KPT Practical Judgment Subtest measures judgment as a specific executive function. The subtest score indicates the patient's ability to recognize problems and think through appropriate solutions. The Total KPT score is a broader test that combines practical judgment with communication skills and visual memory.  Because impairment in verbal skills and visual memory are often seen in persons with advanced dementia, the Total KPT score

## 2024-01-24 NOTE — NUTRITION
"   01/24/24 1218   Biochemical Data,Medical Tests, and Procedures   Biochemical Data/Medical Tests/Procedures Lab values reviewed;Meds reviewed   Labs (Comment) 1/24/2024 glucose 121, creatinine 0.53   Meds (Comment) Atorvastatin, fish oil, insulin, lisinopril, Protonix, sodium chloride infusion   Nutrition-Focused Physical Exam   Nutrition-Focused Physical Exam Findings RN skin assessment reviewed;Edema;Wound  (Trace bilateral lower extremity edema)   Nutrition-Focused Physical Exam Findings Diabetic ulcer at right anterior third toe, stage I pressure injury at right fifth anterior toe, arterial ulcer at right medial ankle, arterial ulcer at right lateral ankle, arterial ulcer at right posterior ankle, arterial ulcer at the left lateral ankle, diabetic ulcer at right second toe   Medical-Related Concerns diabetic foot ulcer with osteomyelitis, type 2 diabetes, hypertension, GERD, bladder neoplasm, tobacco use disorder   Current PO Intake   Current Diet Order Cardiac, CCD 2 diet, thin liquids   Current Meal Intake Adequate   Estimated calorie intake compared to estimated need Nutrient needs are met per patient report.   PES Statement   Nutrient (5) Increased nutrient needs (specify) NI-5.1  (Protein)   Related to Wound (s)   As evidenced by: Poor wound healing   Recommendations/Interventions   Malnutrition/BMI Present No   Summary Wound.  Presents for wound check.  Past medical history significant for diabetic foot ulcer with osteomyelitis, type 2 diabetes, hypertension, GERD, bladder neoplasm, tobacco use disorder.  Weight history reviewed.  Noted significant 17# weight loss in 6 months (10.4% body weight), 16# weight loss in 3 months 9.9% body weight), 7# weight loss in 1 month (5.3% body weight).  Trace bilateral lower extremity edema.  Skin integrity as above.  Prescribed a cardiac, CCD 2 diet, thin liquids.  Met with patient at bedside.  She reports her appetite is \"so far so good.\"  Usually has 2 meals daily, " breakfast and dinner.  No dietary restrictions.  NKFA.  Denies dysphagia.  Denies constipation, diarrhea, nausea, vomiting.  Both patient and her daughter cook and grocery shop.  Reports weight fluctuates 130#-150#.  Denies significant weight change.  To manage her type 2 diabetes she checks her blood sugar once daily in the morning ( mg/dL), does not take insulin.  Takes metformin and Januvia as prescribed.  Discussed diet as prescribed.  RD encouraged blood sugar control and adequate protein intake in setting of wound healing.  Encouraged intake of protein at every meal.  Patient reports familiarity with this information, offers no nutrition questions at this time.  RD to follow as needed.   Interventions/Recommendations Continue current diet order   Education Assessment   Education Education initiated/ completed   Patient Nutrition Goals   Goal Comprehend education;Improve skin integrity;Improve to healthful diet

## 2024-01-24 NOTE — NURSING NOTE
RECEIVED FROM ED TO  310 VIA W/C PLACED IN BED BY ED NURSE. . AWAKE AND ALERT/ORIENTED. R/A STATUS 02 SAT 95% DENIES PAIN. WOUND ULCER TO RIGHT  LATERAL FOOT AND ACHILLES AREA. WILL CLEAN /MEASURE AND ASSESS ALL. IVF STARTED. LABS DRAWN AS ORDERED. NO DISTRESS FAMILY AT BEDSIDE. SCDS APPLIED TO LEFT LEG ONLY

## 2024-01-24 NOTE — ASSESSMENT & PLAN NOTE
X-ray right foot-Large soft tissue wound superficial to the Achilles tendon.   Formal podiatry evaluation is pending  MRI foot to be completed  Continue local wound care

## 2024-01-24 NOTE — ASSESSMENT & PLAN NOTE
Patient with right second toe cellulitis  X-ray right foot-Extensive erosive changes and cortical irregularity involving the second distal phalanx concerning for osteomyelitis. Soft tissue swelling of the second toe is noted.   MRI right foot has been ordered and is yet to be completed  Continue IV Zosyn day 2  Will consult ID to follow along  Formal podiatry, and vascular surgery consultation is pending

## 2024-01-24 NOTE — ASSESSMENT & PLAN NOTE
"Lab Results   Component Value Date    HGBA1C 6.5 (H) 11/07/2023       No results for input(s): \"POCGLU\" in the last 72 hours.    Blood Sugar Average: Last 72 hrs:  Hold Januvia and metformin with lactic acidosis  Sliding scale insulin coverage with Accu-Cheks  Continue Lyrica for neuropathy    "

## 2024-01-25 DIAGNOSIS — F32.A ANXIETY AND DEPRESSION: ICD-10-CM

## 2024-01-25 DIAGNOSIS — F41.9 ANXIETY AND DEPRESSION: ICD-10-CM

## 2024-01-25 PROBLEM — Z01.810 PREOPERATIVE CARDIOVASCULAR EXAMINATION: Status: ACTIVE | Noted: 2024-01-25

## 2024-01-25 LAB
ANION GAP SERPL CALCULATED.3IONS-SCNC: 3 MMOL/L
BASOPHILS # BLD AUTO: 0.05 THOUSANDS/ÂΜL (ref 0–0.1)
BASOPHILS NFR BLD AUTO: 1 % (ref 0–1)
BUN SERPL-MCNC: 9 MG/DL (ref 5–25)
CALCIUM SERPL-MCNC: 9 MG/DL (ref 8.4–10.2)
CHLORIDE SERPL-SCNC: 108 MMOL/L (ref 96–108)
CO2 SERPL-SCNC: 28 MMOL/L (ref 21–32)
CREAT SERPL-MCNC: 0.64 MG/DL (ref 0.6–1.3)
EOSINOPHIL # BLD AUTO: 0.37 THOUSAND/ÂΜL (ref 0–0.61)
EOSINOPHIL NFR BLD AUTO: 4 % (ref 0–6)
ERYTHROCYTE [DISTWIDTH] IN BLOOD BY AUTOMATED COUNT: 13.1 % (ref 11.6–15.1)
GFR SERPL CREATININE-BSD FRML MDRD: 86 ML/MIN/1.73SQ M
GLUCOSE SERPL-MCNC: 102 MG/DL (ref 65–140)
GLUCOSE SERPL-MCNC: 110 MG/DL (ref 65–140)
GLUCOSE SERPL-MCNC: 125 MG/DL (ref 65–140)
GLUCOSE SERPL-MCNC: 133 MG/DL (ref 65–140)
GLUCOSE SERPL-MCNC: 94 MG/DL (ref 65–140)
HCT VFR BLD AUTO: 39 % (ref 34.8–46.1)
HGB BLD-MCNC: 12.2 G/DL (ref 11.5–15.4)
IMM GRANULOCYTES # BLD AUTO: 0.02 THOUSAND/UL (ref 0–0.2)
IMM GRANULOCYTES NFR BLD AUTO: 0 % (ref 0–2)
LYMPHOCYTES # BLD AUTO: 1.59 THOUSANDS/ÂΜL (ref 0.6–4.47)
LYMPHOCYTES NFR BLD AUTO: 19 % (ref 14–44)
MCH RBC QN AUTO: 31 PG (ref 26.8–34.3)
MCHC RBC AUTO-ENTMCNC: 31.3 G/DL (ref 31.4–37.4)
MCV RBC AUTO: 99 FL (ref 82–98)
MONOCYTES # BLD AUTO: 0.63 THOUSAND/ÂΜL (ref 0.17–1.22)
MONOCYTES NFR BLD AUTO: 8 % (ref 4–12)
NEUTROPHILS # BLD AUTO: 5.78 THOUSANDS/ÂΜL (ref 1.85–7.62)
NEUTS SEG NFR BLD AUTO: 68 % (ref 43–75)
NRBC BLD AUTO-RTO: 0 /100 WBCS
PLATELET # BLD AUTO: 328 THOUSANDS/UL (ref 149–390)
PMV BLD AUTO: 8.9 FL (ref 8.9–12.7)
POTASSIUM SERPL-SCNC: 4.7 MMOL/L (ref 3.5–5.3)
RBC # BLD AUTO: 3.93 MILLION/UL (ref 3.81–5.12)
SODIUM SERPL-SCNC: 139 MMOL/L (ref 135–147)
WBC # BLD AUTO: 8.44 THOUSAND/UL (ref 4.31–10.16)

## 2024-01-25 PROCEDURE — 82948 REAGENT STRIP/BLOOD GLUCOSE: CPT

## 2024-01-25 PROCEDURE — 99232 SBSQ HOSP IP/OBS MODERATE 35: CPT | Performed by: HOSPITALIST

## 2024-01-25 PROCEDURE — 99223 1ST HOSP IP/OBS HIGH 75: CPT | Performed by: INTERNAL MEDICINE

## 2024-01-25 PROCEDURE — 80048 BASIC METABOLIC PNL TOTAL CA: CPT | Performed by: HOSPITALIST

## 2024-01-25 PROCEDURE — 99223 1ST HOSP IP/OBS HIGH 75: CPT | Performed by: STUDENT IN AN ORGANIZED HEALTH CARE EDUCATION/TRAINING PROGRAM

## 2024-01-25 PROCEDURE — 85025 COMPLETE CBC W/AUTO DIFF WBC: CPT | Performed by: HOSPITALIST

## 2024-01-25 PROCEDURE — 99232 SBSQ HOSP IP/OBS MODERATE 35: CPT

## 2024-01-25 RX ORDER — CLONAZEPAM 1 MG/1
1 TABLET ORAL EVERY EVENING
Qty: 30 TABLET | Refills: 3 | Status: ON HOLD | OUTPATIENT
Start: 2024-01-25

## 2024-01-25 RX ADMIN — ATORVASTATIN CALCIUM 10 MG: 10 TABLET, FILM COATED ORAL at 16:10

## 2024-01-25 RX ADMIN — BUPROPION HYDROCHLORIDE 300 MG: 150 TABLET, EXTENDED RELEASE ORAL at 08:53

## 2024-01-25 RX ADMIN — PREGABALIN 200 MG: 100 CAPSULE ORAL at 16:09

## 2024-01-25 RX ADMIN — LISINOPRIL 5 MG: 5 TABLET ORAL at 08:54

## 2024-01-25 RX ADMIN — SODIUM CHLORIDE 100 ML/HR: 0.9 INJECTION, SOLUTION INTRAVENOUS at 02:09

## 2024-01-25 RX ADMIN — PIPERACILLIN AND TAZOBACTAM 3.38 G: 3; .375 INJECTION, POWDER, FOR SOLUTION INTRAVENOUS at 02:03

## 2024-01-25 RX ADMIN — ENOXAPARIN SODIUM 40 MG: 40 INJECTION SUBCUTANEOUS at 08:54

## 2024-01-25 RX ADMIN — OMEGA-3 FATTY ACIDS CAP 1000 MG 1000 MG: 1000 CAP at 08:52

## 2024-01-25 RX ADMIN — PREGABALIN 200 MG: 100 CAPSULE ORAL at 21:57

## 2024-01-25 RX ADMIN — PIPERACILLIN AND TAZOBACTAM 3.38 G: 3; .375 INJECTION, POWDER, FOR SOLUTION INTRAVENOUS at 08:59

## 2024-01-25 RX ADMIN — PREGABALIN 200 MG: 100 CAPSULE ORAL at 08:53

## 2024-01-25 RX ADMIN — PANTOPRAZOLE SODIUM 40 MG: 40 TABLET, DELAYED RELEASE ORAL at 08:00

## 2024-01-25 RX ADMIN — FLUTICASONE FUROATE AND VILANTEROL TRIFENATATE 1 PUFF: 200; 25 POWDER RESPIRATORY (INHALATION) at 08:54

## 2024-01-25 RX ADMIN — ASPIRIN 81 MG: 81 TABLET, COATED ORAL at 08:52

## 2024-01-25 RX ADMIN — AMILORIDE HYDROCLORIDE 5 MG: 5 TABLET ORAL at 08:54

## 2024-01-25 RX ADMIN — INSULIN LISPRO 5 UNITS: 100 INJECTION, SOLUTION INTRAVENOUS; SUBCUTANEOUS at 12:02

## 2024-01-25 RX ADMIN — OXYBUTYNIN CHLORIDE 5 MG: 5 TABLET, EXTENDED RELEASE ORAL at 08:53

## 2024-01-25 RX ADMIN — SODIUM CHLORIDE 100 ML/HR: 0.9 INJECTION, SOLUTION INTRAVENOUS at 20:08

## 2024-01-25 NOTE — NURSING NOTE
Awake and alert and oriented 02 is maintained 2liters n/c . Resp easy no sob. Denies pain . Ivf continue call bell near. Dressing is dry and intact to rt foot and no changes to same. Bed alarm on

## 2024-01-25 NOTE — ASSESSMENT & PLAN NOTE
Patient has multiple problems including some underlying lung disease.  There is no overt coronary disease at least by symptoms.  She states she can walk up 2 flights of steps.    She would certainly not be interested in cardiac revascularization for asymptomatic CAD.    Having said that, at this point her symptomatic problem is her leg and in my opinion it is reasonable to proceed with whatever procedure is needed for the most durable results including surgical abdominal approach.

## 2024-01-25 NOTE — ASSESSMENT & PLAN NOTE
Patient with right second toe cellulitis  X-ray right foot-Extensive erosive changes and cortical irregularity involving the second distal phalanx concerning for osteomyelitis. Soft tissue swelling of the second toe is noted.   MRI right foot-Second toe distal skin ulceration with cellulitis as well as resorptive changes at the second distal tuft. The residual second distal phalanx exhibits increased T2 signal with T1 replacement signal concerning for osteomyelitis. There is also marrow edema. Second proximal phalanx without T1 replacement signal at this time; therefore, low suspicion for osteomyelitis.   Continue IV Zosyn day #3  Formal ID consultation is pending  Blood cultures x 2 sets-no growth at 24 hours  Wound culture-no growth thus far  Awaiting additional podiatry input

## 2024-01-25 NOTE — CONSULTS
Consultation - Infectious Disease   Lona Cedeno 78 y.o. female MRN: [id number]  Unit/Bed#: -01 Encounter: [id number]      IMPRESSION & RECOMMENDATIONS:     1. Right ankle nonhealing wounds with exposed Achilles tendon. The patient has had wounds present over the right Achilles tendon, medial ankle and second toe for many months. The Achilles tendon is now exposed. LEADS show diffuse disease throughout bilateral femoral-popliteal arteries, JOSE in the ischemic category and right great toe pressure of 0mmHg. CTA shows aortic plaque. Given exposed Achilles tendon, antibiotics will not be helpful without debridement and wound coverage. There is no acute soft tissue infection present requiring urgent antibiotics. Regardless, in the setting of severe occlusive vascular disease with JOSE's in the ischemic range, antibiotic delivery is poor and likely futile. The patient is hemodynamically stable, afebrile without leukocytosis currently.   -Stop Zosyn and monitor off antibiotics   -Vascular intervention per vascular surgery   -Podiatry following   -Monitor wounds for developing signs of infection   -Repeat CBC tomorrow to monitor for infection    2.  PAD.  LEADS show severe bilateral occlusive disease with ABIs in the ischemic range.  Per report, CTA shows plaque throughout the visualized aorta.  Patient will need vascular intervention prior to any podiatric procedure   -Vascular surgery follow-up    3.  Type 2 diabetes mellitus with neuropathy.  Hemoglobin A1c 6.5%.  Still a risk factor for poor wound healing and infection.   -Glycemic management per primary team    4.  Tobacco abuse.  Patient continues to smoke.  This is a risk factor for poor wound healing.  Encourage cessation.    I have discussed the above management plan to discontinue Zosyn with Dr. [facility]. Discussed with the patient at bedside. ID will follow.    I have performed an extensive review of the medical records in Epic including review of the  notes, radiographs, and laboratory results     HISTORY OF PRESENT ILLNESS:  Reason for Consult: nonhealing wounds  HPI: Lona Cedeno is a 78 y.o. woman with a history of COPD, DM2 with neuropathy, PAD, tobacco use who was sent to the [facility] ED by Podiatry 24 due to right second toe infection and nonhealing wounds of the right ankle with exposed Achilles tendon. The patient states that her wounds have been present for several months and have been worsening. The heel wound started as a blister when she wore low socks and the toe wound developed due to wearing flip flops. She has a wound over the ankle as well. On presentation, the patient was afebrile, hemodynamically stable, with a WBC count of 12.73. She was started on Zosyn. LEADS from  show diffuse disease throughout the right femoral-popliteal arteries, JOSE in the ischemic category and toe pressure of 0mmHg. The was also significant occlusive disease on the left. MRI of the right foot/ankle show erosive changes of the second distal phalanx compatible with osteomyelitis, Achilles tendon tendinosis and medial skin ulceration but no definitive evidence of osteomyelitis.  Vascular surgery has been consulted and the patient would require revascularization surgery prior to any podiatric intervention.  The patient is unsure what she wants to do.  ID is consulted for further evaluation.    REVIEW OF SYSTEMS:  A complete review of systems is negative other than that noted in the HPI.    PAST MEDICAL HISTORY:  Past Medical History:   Diagnosis Date    Anxiety     Closed fracture of coccyx (HCC) 2023    Diabetes mellitus (HCC)     GERD (gastroesophageal reflux disease)     Hyperlipidemia     Hypertension     IBS (irritable bowel syndrome)     Shoulder fracture      Past Surgical History:   Procedure Laterality Date    ANKLE FRACTURE SURGERY Right     has screw in place     SECTION      x2    CHOLECYSTECTOMY      CYSTOSCOPY W/ LASER LITHOTRIPSY Left  2023    Procedure: CYSTOSCOPY; RETROGRADE; URETEROSCOPY; BIOPSY; LEFT -INSERTION OF STENT;  Surgeon: Cristian Child MD;  Location: [facility];  Service: Urology    CYSTOSCOPY W/ LASER LITHOTRIPSY Left 2023    Procedure: CYSTOSCOPY; RETROGRADE; URETEROSCOPY; 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;  Surgeon: Cristian Child MD;  Location: [facility];  Service: Urology    FL RETROGRADE PYELOGRAM  2023    HERNIA REPAIR      umbilicus w/ mesh       FAMILY HISTORY:  Non-contributory    SOCIAL HISTORY:  Social History   Social History     Substance and Sexual Activity   Alcohol Use Not Currently     Social History     Substance and Sexual Activity   Drug Use Never     Social History     Tobacco Use   Smoking Status Every Day    Current packs/day: 1.00    Average packs/day: 1 pack/day for 63.1 years (63.1 ttl pk-yrs)    Types: Cigarettes    Start date:    Smokeless Tobacco Never   Tobacco Comments    23 smokes about 3/4 PPD       ALLERGIES:  Allergies   Allergen Reactions    Paroxetine Other (See Comments)     Became suicidal    Adhesive [Medical Tape] Itching and Blisters    Carafate [Sucralfate] Other (See Comments)     Mouth sores, tongue sore    Sulfa Antibiotics Hives and Rash    Sulfamethoxazole-Trimethoprim Hives       MEDICATIONS:  All current active medications have been reviewed.      PHYSICAL EXAM:  Temp:  [97.7 °F (36.5 °C)-98.8 °F (37.1 °C)] 97.7 °F (36.5 °C)  HR:  [60-77] 76  Resp:  [16-20] 18  BP: (107-115)/(50-99) 115/99  SpO2:  [94 %-100 %] 94 %  Temp (24hrs), Av.3 °F (36.8 °C), Min:97.7 °F (36.5 °C), Max:98.8 °F (37.1 °C)  Current: Temperature: 97.7 °F (36.5 °C)    Intake/Output Summary (Last 24 hours) at 2024 1407  Last data filed at 2024 1300  Gross per 24 hour   Intake 2080 ml   Output --   Net 2080 ml       General Appearance:  Appearing well, nontoxic, and in no distress   Head:  Normocephalic, without obvious abnormality, atraumatic   Eyes:   Conjunctiva pink and sclera anicteric, both eyes   Nose: Nares normal, mucosa normal, no drainage   Throat: Oropharynx moist without lesions   Neck: Supple, symmetrical, no adenopathy, no tenderness/mass/nodules   Back:   Symmetric, no curvature, ROM normal, no CVA tenderness   Lungs:   Clear to auscultation bilaterally, respirations unlabored   Chest Wall:  No tenderness or deformity   Heart:  RRR; no murmur, rub or gallop   Abdomen:   Soft, non-tender, non-distended, positive bowel sounds    Extremities: No cyanosis, clubbing or edema   Skin: Right heel wound with exposed Achilles tendon with surrounding slough. Ulceration over medial right ankle, right second toe mild swelling and distal ulceration. No cellulitis    Lymph nodes: Cervical, supraclavicular nodes normal   Neurologic: Alert and oriented times 3, extremity strength 5/5 and symmetric       LABS, IMAGING, & OTHER STUDIES:  Lab Results:  I have personally reviewed pertinent labs.  Results from last 7 days   Lab Units 01/25/24 0426 01/24/24 0505 01/23/24 1940   WBC Thousand/uL 8.44 9.80 12.73*   HEMOGLOBIN g/dL 12.2 13.9 14.0   PLATELETS Thousands/uL 328 363 404*     Results from last 7 days   Lab Units 01/25/24 0426 01/24/24 0505 01/23/24 1940   SODIUM mmol/L 139 137 135   POTASSIUM mmol/L 4.7 4.4 4.5   CHLORIDE mmol/L 108 104 99   CO2 mmol/L 28 26 25   BUN mg/dL 9 10 12   CREATININE mg/dL 0.64 0.53* 0.54*   EGFR ml/min/1.73sq m 86 91 91   CALCIUM mg/dL 9.0 10.1 10.3*   AST U/L  --   --  10*   ALT U/L  --   --  7   ALK PHOS U/L  --   --  64     Results from last 7 days   Lab Units 01/23/24 1952 01/23/24 1945 01/23/24 1940   BLOOD CULTURE  No Growth at 24 hrs.  --  No Growth at 24 hrs.   GRAM STAIN RESULT   --  No Polys or Bacteria seen  --    WOUND CULTURE   --  Culture too young- will reincubate  --      Results from last 7 days   Lab Units 01/24/24  0505 01/23/24 1940   PROCALCITONIN ng/ml <0.05 <0.05     Results from last 7 days   Lab Units  01/23/24 1940   CRP mg/L 24.2*               Imaging Studies:   I have personally reviewed pertinent imaging study reports and images in PACS.    MRI right foot-second toe ulceration with cellulitis, distal phalanx osteomyelitis

## 2024-01-25 NOTE — PLAN OF CARE
Problem: Prexisting or High Potential for Compromised Skin Integrity  Goal: Skin integrity is maintained or improved  Description: INTERVENTIONS:  - Identify patients at risk for skin breakdown  - Assess and monitor skin integrity  - Assess and monitor nutrition and hydration status  - Monitor labs   - Assess for incontinence   - Turn and reposition patient  - Assist with mobility/ambulation  - Relieve pressure over bony prominences  - Avoid friction and shearing  - Provide appropriate hygiene as needed including keeping skin clean and dry  - Evaluate need for skin moisturizer/barrier cream  - Collaborate with interdisciplinary team   - Patient/family teaching  - Consider wound care consult   Outcome: Progressing     Problem: PAIN - ADULT  Goal: Verbalizes/displays adequate comfort level or baseline comfort level  Description: Interventions:  - Encourage patient to monitor pain and request assistance  - Assess pain using appropriate pain scale  - Administer analgesics based on type and severity of pain and evaluate response  - Implement non-pharmacological measures as appropriate and evaluate response  - Consider cultural and social influences on pain and pain management  - Notify physician/advanced practitioner if interventions unsuccessful or patient reports new pain  Outcome: Progressing     Problem: INFECTION - ADULT  Goal: Absence or prevention of progression during hospitalization  Description: INTERVENTIONS:  - Assess and monitor for signs and symptoms of infection  - Monitor lab/diagnostic results  - Monitor all insertion sites, i.e. indwelling lines, tubes, and drains  - Monitor endotracheal if appropriate and nasal secretions for changes in amount and color  - [place] appropriate cooling/warming therapies per order  - Administer medications as ordered  - Instruct and encourage patient and family to use good hand hygiene technique  - Identify and instruct in appropriate isolation precautions for  identified infection/condition  Outcome: Progressing  Goal: Absence of fever/infection during neutropenic period  Description: INTERVENTIONS:  - Monitor WBC    Outcome: Progressing     Problem: SAFETY ADULT  Goal: Patient will remain free of falls  Description: INTERVENTIONS:  - Educate patient/family on patient safety including physical limitations  - Instruct patient to call for assistance with activity   - Consult OT/PT to assist with strengthening/mobility   - Keep Call bell within reach  - Keep bed low and locked with side rails adjusted as appropriate  - Keep care items and personal belongings within reach  - Initiate and maintain comfort rounds  - Make Fall Risk Sign visible to staff  - Offer Toileting every 4 Hours, in advance of need  - Initiate/Maintain BED alarm  - Obtain necessary fall risk management equipment:   - Apply yellow socks and bracelet for high fall risk patients  - Consider moving patient to room near nurses station  Outcome: Progressing  Goal: Maintain or return to baseline ADL function  Description: INTERVENTIONS:  -  Assess patient's ability to carry out ADLs; assess patient's baseline for ADL function and identify physical deficits which impact ability to perform ADLs (bathing, care of mouth/teeth, toileting, grooming, dressing, etc.)  - Assess/evaluate cause of self-care deficits   - Assess range of motion  - Assess patient's mobility; develop plan if impaired  - Assess patient's need for assistive devices and provide as appropriate  - Encourage maximum independence but intervene and supervise when necessary  - Involve family in performance of ADLs  - Assess for home care needs following discharge   - Consider OT consult to assist with ADL evaluation and planning for discharge  - Provide patient education as appropriate  Outcome: Progressing  Goal: Maintains/Returns to pre admission functional level  Description: INTERVENTIONS:  - Perform AM-PAC 6 Click Basic Mobility/ Daily Activity  assessment daily.  - Set and communicate daily mobility goal to care team and patient/family/caregiver.   - Collaborate with rehabilitation services on mobility goals if consulted  - Perform Range of Motion 2 times a day.  - Reposition patient every 2 hours.  - Dangle patient 2 times a day  - Stand patient 2 times a day  - Ambulate patient 2 times a day  - Out of bed to chair 2 times a day   - Out of bed for meals 2 times a day  - Out of bed for toileting  - Record patient progress and toleration of activity level   Outcome: Progressing     Problem: DISCHARGE PLANNING  Goal: Discharge to home or other facility with appropriate resources  Description: INTERVENTIONS:  - Identify barriers to discharge w/patient and caregiver  - Arrange for needed discharge resources and transportation as appropriate  - Identify discharge learning needs (meds, wound care, etc.)  - Arrange for interpretive services to assist at discharge as needed  - Refer to Case Management Department for coordinating discharge planning if the patient needs post-hospital services based on physician/advanced practitioner order or complex needs related to functional status, cognitive ability, or social support system  Outcome: Progressing     Problem: Knowledge Deficit  Goal: Patient/family/caregiver demonstrates understanding of disease process, treatment plan, medications, and discharge instructions  Description: Complete learning assessment and assess knowledge base.  Interventions:  - Provide teaching at level of understanding  - Provide teaching via preferred learning methods  Outcome: Progressing     Problem: Nutrition/Hydration-ADULT  Goal: Nutrient/Hydration intake appropriate for improving, restoring or maintaining nutritional needs  Description: Monitor and assess patient's nutrition/hydration status for malnutrition. Collaborate with interdisciplinary team and initiate plan and interventions as ordered.  Monitor patient's weight and dietary  intake as ordered or per policy. Utilize nutrition screening tool and intervene as necessary. Determine patient's food preferences and provide high-protein, high-caloric foods as appropriate.     INTERVENTIONS:  - Monitor oral intake, urinary output, labs, and treatment plans  - Assess nutrition and hydration status and recommend course of action  - Evaluate amount of meals eaten  - Assist patient with eating if necessary   - Allow adequate time for meals  - Recommend/ encourage appropriate diets, oral nutritional supplements, and vitamin/mineral supplements  - Order, calculate, and assess calorie counts as needed  - Recommend, monitor, and adjust tube feedings and TPN/PPN based on assessed needs  - Assess need for intravenous fluids  - Provide specific nutrition/hydration education as appropriate  - Include patient/family/caregiver in decisions related to nutrition  Outcome: Progressing     Problem: SKIN/TISSUE INTEGRITY - ADULT  Goal: Skin Integrity remains intact(Skin Breakdown Prevention)  Description: Assess:  -Perform Brooks assessment every SHIFT  -Clean and moisturize skin every DAILY  -Inspect skin when repositioning, toileting, and assisting with ADLS  -Assess under medical devices such as DRESSINGS  every DAILY AND Q SHIFT  -Assess extremities for adequate circulation and sensation     Bed Management:  -Have minimal linens on bed & keep smooth, unwrinkled  -Change linens as needed when moist or perspiring  -Avoid sitting or lying in one position for more than 2 hours while in bed  -Keep HOB at 30degrees     Toileting:  -Offer bedside commode  -Assess for incontinence every 4  -Use incontinent care products after each incontinent episode such as [employer]    Activity:  -Mobilize patient 2 times a day  -Encourage activity and walks on unit  -Encourage or provide ROM exercises   -Turn and reposition patient every 2 Hours  -Use appropriate equipment to lift or move patient in bed  -Instruct/ Assist with  weight shifting every 2 when out of bed in chair  -Consider limitation of chair time 2 hour intervals    Skin Care:  -Avoid use of baby powder, tape, friction and shearing, hot water or constrictive clothing  -Relieve pressure over bony prominences using ALEVYN  -Do not massage red bony areas    Next Steps:  -Teach patient strategies to minimize risks such as    -Consider consults to  interdisciplinary teams such as   Outcome: Progressing  Goal: Incision(s), wounds(s) or drain site(s) healing without S/S of infection  Description: INTERVENTIONS  - Assess and document dressing, incision, wound bed, drain sites and surrounding tissue  - Provide patient and family education  - Perform skin care/dressing changes every DAILY  Outcome: Progressing

## 2024-01-25 NOTE — ASSESSMENT & PLAN NOTE
Recent arterial Dopplers with diffuse disease throughout the femoral-popliteal arteries without significant focal stenosis  Appreciate vascular surgical consultation  CTA abdomen with runoff, with and without contrast-is pending  Continue aspirin and Lipitor

## 2024-01-25 NOTE — ASSESSMENT & PLAN NOTE
X-ray right foot-Large soft tissue wound superficial to the Achilles tendon.   MRI right ankle - 1. Medial ankle skin ulceration with mild reactive marrow edema at the medial malleolus without T1 replacement signal at this time. Findings are low suspicion for osteomyelitis. 2. Posterior ankle skin ulceration with thinning of the adjacent Achilles tendon and mild critical zone tendinosis. There is no evidence of tear with an intact insertion. 4. Chronic healed posterior and medial malleoli are fractures with screw fixation of a distal fibular fracture, the latter incompletely evaluated due to susceptibility artifacts.   Continue local wound care  Await further input from podiatry

## 2024-01-25 NOTE — PROGRESS NOTES
[facility]  Progress Note  Name: Lona Cedeno I  MRN: [id number]  Unit/Bed#: -01 I Date of Admission: 1/23/2024   Date of Service: 1/25/2024 I Hospital Day: 2    Assessment/Plan   * Diabetic foot ulcer with osteomyelitis ([facility])  Assessment & Plan  Patient with right second toe cellulitis  X-ray right foot-Extensive erosive changes and cortical irregularity involving the second distal phalanx concerning for osteomyelitis. Soft tissue swelling of the second toe is noted.   MRI right foot-Second toe distal skin ulceration with cellulitis as well as resorptive changes at the second distal tuft. The residual second distal phalanx exhibits increased T2 signal with T1 replacement signal concerning for osteomyelitis. There is also marrow edema. Second proximal phalanx without T1 replacement signal at this time; therefore, low suspicion for osteomyelitis.   Continue IV Zosyn day #3  Formal ID consultation is pending  Blood cultures x 2 sets-no growth at 24 hours  Wound culture-no growth thus far  Awaiting additional podiatry input      Ankle ulcer, right, with fat layer exposed ([facility])  Assessment & Plan  X-ray right foot-Large soft tissue wound superficial to the Achilles tendon.   MRI right ankle - 1. Medial ankle skin ulceration with mild reactive marrow edema at the medial malleolus without T1 replacement signal at this time. Findings are low suspicion for osteomyelitis. 2. Posterior ankle skin ulceration with thinning of the adjacent Achilles tendon and mild critical zone tendinosis. There is no evidence of tear with an intact insertion. 4. Chronic healed posterior and medial malleoli are fractures with screw fixation of a distal fibular fracture, the latter incompletely evaluated due to susceptibility artifacts.   Continue local wound care  Await further input from podiatry      PAD (peripheral artery disease) ([facility])  Assessment & Plan  Recent arterial Dopplers with diffuse disease  throughout the femoral-popliteal arteries without significant focal stenosis  Appreciate vascular surgical consultation  CTA abdomen with runoff, with and without contrast-is pending  Continue aspirin and Lipitor    Essential hypertension  Assessment & Plan  Blood pressure is well-controlled  Continue amiloride, and lisinopril    Type 2 diabetes mellitus with diabetic polyneuropathy (HCC)  Assessment & Plan  Lab Results   Component Value Date    HGBA1C 6.5 (H) 11/07/2023       Recent Labs     01/24/24  1058 01/24/24  1556 01/24/24  2224 01/25/24  0711   POCGLU 121 73 98 94         Blood Sugar Average: Last 72 hrs:  (P) 94  Target blood sugar for the hospital is 140-180  Blood sugars are stable at this time  Continue Accu-Cheks before meals and at bedtime with sliding scale coverage  Oral hypoglycemics remain on hold  Diabetic neuropathy-continue Lyrica      COPD, severity to be determined (HCC)  Assessment & Plan  No exacerbation  Continue home medications  Patient is to have a PFT scheduled as an outpatient   Of note, patient continues to smoke-cessation counseling provided, nicotine patch is in place    Anxiety and depression  Assessment & Plan  Continue home Wellbutrin daily and clonazepam in the evening    GERD without esophagitis  Assessment & Plan  Continue PPI    Bladder neoplasm  Assessment & Plan  Urology follow-up as an outpatient    Lactic acidosis  Assessment & Plan  IV fluids and monitor  Does not meet sepsis at this time  Was 3.3 on admission, repeat 1.7               VTE Pharmacologic Prophylaxis: VTE Score: 7 High Risk (Score >/= 5) - Pharmacological DVT Prophylaxis Ordered: enoxaparin (Lovenox). Sequential Compression Devices Ordered.    Mobility:   Basic Mobility Inpatient Raw Score: 19  JH-HLM Goal: 6: Walk 10 steps or more  JH-HLM Achieved: 7: Walk 25 feet or more  HLM Goal achieved. Continue to encourage appropriate mobility.    Patient Centered Rounds: I performed bedside rounds with nursing  staff today.   Discussions with Specialists or Other Care Team Provider: Vascular surgery, podiatry, infectious disease    Education and Discussions with Family / Patient: Patient declined call to .     Total Time Spent on Date of Encounter in care of patient: 45 mins. This time was spent on one or more of the following: performing physical exam; counseling and coordination of care; obtaining or reviewing history; documenting in the medical record; reviewing/ordering tests, medications or procedures; communicating with other healthcare professionals and discussing with patient's family/caregivers.    Current Length of Stay: 2 day(s)  Current Patient Status: Inpatient   Certification Statement: The patient will continue to require additional inpatient hospital stay due to need for continued IV antibiotics, vascular surgery follow-up, ID follow-up, and podiatry follow-up  Discharge Plan:  Discharge planning as per continued medical course    Code Status: Level 3 - DNAR and DNI    Subjective:   Patient seen, sitting up in bed, doing okay, is concerned about potentially losing her leg.  Denies any pain or discomfort otherwise at this time    Objective:     Vitals:   Temp (24hrs), Av.3 °F (36.8 °C), Min:97.7 °F (36.5 °C), Max:98.8 °F (37.1 °C)    Temp:  [97.7 °F (36.5 °C)-98.8 °F (37.1 °C)] 97.7 °F (36.5 °C)  HR:  [60-77] 76  Resp:  [16-20] 18  BP: (107-115)/(50-99) 115/99  SpO2:  [94 %-100 %] 94 %  Body mass index is 27.53 kg/m².     Input and Output Summary (last 24 hours):     Intake/Output Summary (Last 24 hours) at 2024 1107  Last data filed at 2024 0900  Gross per 24 hour   Intake 2836.67 ml   Output --   Net 2836.67 ml       Physical Exam:   Physical Exam  Constitutional:       General: She is not in acute distress.     Appearance: Normal appearance. She is normal weight. She is not ill-appearing.   HENT:      Head: Normocephalic and atraumatic.      Nose: Nose normal.       Mouth/Throat:      Mouth: Mucous membranes are moist.   Eyes:      Extraocular Movements: Extraocular movements intact.      Pupils: Pupils are equal, round, and reactive to light.   Cardiovascular:      Rate and Rhythm: Normal rate and regular rhythm.      Pulses: Normal pulses.      Heart sounds: Normal heart sounds. No murmur heard.     No friction rub. No gallop.   Pulmonary:      Effort: Pulmonary effort is normal. No respiratory distress.      Breath sounds: Normal breath sounds. No wheezing, rhonchi or rales.   Abdominal:      General: There is no distension.      Palpations: Abdomen is soft. There is no mass.      Tenderness: There is no abdominal tenderness.      Hernia: No hernia is present.   Musculoskeletal:         General: No swelling or tenderness. Normal range of motion.      Cervical back: Normal range of motion and neck supple. No rigidity.      Right lower leg: No edema.      Left lower leg: No edema.      Comments: Right foot dressing is in place, dry, and intact   Skin:     General: Skin is warm.      Capillary Refill: Capillary refill takes less than 2 seconds.      Findings: No erythema or rash.   Neurological:      General: No focal deficit present.      Mental Status: She is alert and oriented to person, place, and time. Mental status is at baseline.      Cranial Nerves: No cranial nerve deficit.      Motor: No weakness.   Psychiatric:         Mood and Affect: Mood normal.         Behavior: Behavior normal.          Additional Data:     Labs:  Results from last 7 days   Lab Units 01/25/24  0426   WBC Thousand/uL 8.44   HEMOGLOBIN g/dL 12.2   HEMATOCRIT % 39.0   PLATELETS Thousands/uL 328   NEUTROS PCT % 68   LYMPHS PCT % 19   MONOS PCT % 8   EOS PCT % 4     Results from last 7 days   Lab Units 01/25/24  0426 01/24/24  0505 01/23/24  1940   SODIUM mmol/L 139   < > 135   POTASSIUM mmol/L 4.7   < > 4.5   CHLORIDE mmol/L 108   < > 99   CO2 mmol/L 28   < > 25   BUN mg/dL 9   < > 12   CREATININE  mg/dL 0.64   < > 0.54*   ANION GAP mmol/L 3   < > 11   CALCIUM mg/dL 9.0   < > 10.3*   ALBUMIN g/dL  --   --  4.2   TOTAL BILIRUBIN mg/dL  --   --  0.21   ALK PHOS U/L  --   --  64   ALT U/L  --   --  7   AST U/L  --   --  10*   GLUCOSE RANDOM mg/dL 110   < > 120    < > = values in this interval not displayed.     Results from last 7 days   Lab Units 01/23/24  1940   INR  1.03     Results from last 7 days   Lab Units 01/25/24  0711 01/24/24  2224 01/24/24  1556 01/24/24  1058 01/24/24  0707 01/23/24 2215   POC GLUCOSE mg/dl 94 98 73 121 91 87         Results from last 7 days   Lab Units 01/24/24  0505 01/23/24 2223 01/23/24 1940   LACTIC ACID mmol/L  --  1.7 3.3*   PROCALCITONIN ng/ml <0.05  --  <0.05       Lines/Drains:  Invasive Devices       Peripheral Intravenous Line  Duration             Peripheral IV 01/23/24 Right;Ventral (anterior) Forearm 1 day              Drain  Duration             Ureteral Internal Stent Left ureter 6 Fr. 66 days                          Imaging: Reviewed radiology reports from this admission including: MRI right foot, MRI right ankle-see the results as outlined above    Recent Cultures (last 7 days):   Results from last 7 days   Lab Units 01/23/24 1952 01/23/24 1945 01/23/24 1940   BLOOD CULTURE  No Growth at 24 hrs.  --  No Growth at 24 hrs.   GRAM STAIN RESULT   --  No Polys or Bacteria seen  --    WOUND CULTURE   --  Culture too young- will reincubate  --        Last 24 Hours Medication List:   Current Facility-Administered Medications   Medication Dose Route Frequency Provider Last Rate    acetaminophen  650 mg Oral Q4H PRN Pam Ambrocio PA-C      albuterol  2 puff Inhalation Q6H PRN Pam Ambrocio PA-C      AMILoride  5 mg Oral Daily Pam Ambrocio PA-C      aspirin  81 mg Oral Daily Pam Ambrocio PA-C      atorvastatin  10 mg Oral Daily With Dinner Pam Ambrocio PA-C      buPROPion  300 mg Oral Daily Pam Goodman  SABIHA Ambrocio      clonazePAM  1 mg Oral QPM Pam Ambrocio PA-C      enoxaparin  40 mg Subcutaneous Daily Pam Ambrocio PA-C      fish oil  1,000 mg Oral Daily Pam Ambrocio PA-C      fluticasone-vilanterol  1 puff Inhalation Daily Pam Ambrocio PA-C      insulin lispro  1-5 Units Subcutaneous TID AC Pam Ambrocio PA-C      insulin lispro  1-5 Units Subcutaneous HS Pam Ambrocio PA-C      insulin lispro  5 Units Subcutaneous TID With Meals Pam Ambrocio PA-C      lisinopril  5 mg Oral Daily aPm Ambrocio PA-C      nicotine  1 patch Transdermal Daily Pam Ambrocio PA-C      ondansetron  4 mg Intravenous Q6H PRN Pam Ambrocio PA-C      oxybutynin  5 mg Oral Daily Pam Ambrocio PA-C      pantoprazole  40 mg Oral Early Morning Pam Ambrocio PA-C      piperacillin-tazobactam  3.375 g Intravenous Q6H Pam Ambrocio PA-C 3.375 g (01/24/24 1435)    pregabalin  200 mg Oral TID Pma Ambrocio PA-C      sodium chloride  100 mL/hr Intravenous Continuous Pam Ambrocio PA-C 100 mL/hr (01/25/24 0209)        Today, Patient Was Seen By: Claudine Pretty MD    **Please Note: This note may have been constructed using a voice recognition system.**

## 2024-01-25 NOTE — CONSULTS
Consult - Podiatry   Lona Cedeno 78 y.o. female MRN: [id number]  Unit/Bed#: -01 Encounter: [id number]    Assessment/Plan     Assessment:  Right 2nd toe osteomyelitis   Right posterior lower leg ulcer with exposed AT   Right medial ankle diabetic ulcer  Diabetic neuropathy  PAD    Tobacco abuse     Plan:    - Right lower extremity can be salvaged if adequate blood flow is established. Patient will likely need multiple podiatric surgical interventions including toe amputation, repeat wound debridements with skin graft substitutes and possible STSG.  I informed patient limb salvage option would require her to be compliant with nonweightbearing status, serial wound debridements on outpatient settings as below as inpatient and close follow-up.  She continues to remain at high risk of limb loss given extent of her wounds.  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  She would like to discuss her treatment options with her family before she makes a definitive decision.  -No podiatric surgical intervention until revascularization has been established, appreciate vascular surgery for evaluation.  - Right foot MRI is reviewed consistent erosive change noted second distal phalanx with T1 replacement at the base of the second toe compatible with definite osteomyelitis   - Right ankle/heel MRI read as:  Medial ankle skin ulceration with mild reactive marrow edema at the medial malleolus without T1 replacement signal at this time. Findings are low suspicion for osteomyelitis. Posterior ankle skin ulceration with thinning of the adjacent Achilles tendon and mild critical zone tendinosis. There is no evidence of tear with an intact insertion. Chronic healed posterior and medial malleoli are fractures with screw fixation of a distal fibular fracture, the latter incompletely evaluated due to susceptibility artifacts.   -Continue local wound care as detailed  -Rest of care per primary  service      History of Present Illness       HPI:  Lona Cedeno is a 78 y.o. female with PMH significant for as detailed below presents for evaluation of right lower extremity multiple ulcerations and osteomyelitis of second toe.  Patient was sent from my office for further care and management due to cellulitis and osteomyelitis of second toe.  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    Inpatient consult to Podiatry  Consult performed by: Jennifer Gann DPM  Consult ordered by: Pam Ambrocio PA-C      Review of Systems   Constitutional: Negative.    HENT: Negative.    Eyes: Negative.    Respiratory: Negative.    Cardiovascular: Negative.    Gastrointestinal: Negative.    Musculoskeletal: Negative  Skin: Bilateral lower extremity ulcerations  Neurological: Negative.   Psych: negative.       Historical Information   Past Medical History:   Diagnosis Date    Anxiety     Closed fracture of coccyx (HCC) 2023    Diabetes mellitus (HCC)     GERD (gastroesophageal reflux disease)     Hyperlipidemia     Hypertension     IBS (irritable bowel syndrome)     Shoulder fracture      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;  Surgeon: Cristian Child MD;  Location: [facility];  Service: Urology    FL RETROGRADE PYELOGRAM  2023    HERNIA REPAIR      umbilicus w/ mesh     Social History   Social History     Substance and Sexual  Activity   Alcohol Use Not Currently     Social History     Substance and Sexual Activity   Drug Use Never     Social History     Tobacco Use   Smoking Status Every Day    Current packs/day: 1.00    Average packs/day: 1 pack/day for 63.1 years (63.1 ttl pk-yrs)    Types: Cigarettes    Start date: 1961   Smokeless Tobacco Never   Tobacco Comments    11/14/23 smokes about 3/4 PPD     Family History:   Family History   Problem Relation Age of Onset    COPD Mother     Emphysema Mother     COPD Father     Emphysema Father        Meds/Allergies   Medications Prior to Admission   Medication    acetaminophen (TYLENOL) 325 mg tablet    albuterol (Ventolin HFA) 90 mcg/act inhaler    AMILoride 5 mg tablet    Apoaequorin (Prevagen) 10 MG CAPS    Aspirin (ASPIR-81 PO)    atorvastatin (LIPITOR) 10 mg tablet    Blood Glucose Monitoring Suppl (ONETOUCH VERIO) w/Device KIT    buPROPion (WELLBUTRIN XL) 300 mg 24 hr tablet    clonazePAM (KlonoPIN) 1 mg tablet    Fluticasone-Salmeterol (Advair Diskus) 250-50 mcg/dose inhaler    glucose blood (OneTouch Verio) test strip    Januvia 100 MG tablet    Lancets (OneTouch Delica Plus [id number]) MISC    lisinopril (ZESTRIL) 5 mg tablet    metFORMIN (GLUCOPHAGE-XR) 500 mg 24 hr tablet    Misc. Devices (Carex Coccyx Cushion) MISC    Omega-3 Fatty Acids (FISH OIL PO)    pantoprazole (PROTONIX) 40 mg tablet    potassium chloride (K-DUR,KLOR-CON) 10 mEq tablet    pregabalin (LYRICA) 200 MG capsule    solifenacin (VESICARE) 5 mg tablet     Allergies   Allergen Reactions    Paroxetine Other (See Comments)     Became suicidal    Adhesive [Medical Tape] Itching and Blisters    Carafate [Sucralfate] Other (See Comments)     Mouth sores, tongue sore    Sulfa Antibiotics Hives and Rash    Sulfamethoxazole-Trimethoprim Hives       Objective   First Vitals:   Blood Pressure: 134/60 (01/23/24 1935)  Pulse: 88 (01/23/24 1935)  Temperature: 99 °F (37.2 °C) (01/23/24 1935)  Temp Source: Oral (01/23/24  "2105)  Respirations: 18 (01/23/24 1935)  Height: 5' 1\" (154.9 cm) (01/23/24 2105)  Weight - Scale: 66.1 kg (145 lb 11.6 oz) (01/23/24 2105)  SpO2: 98 % (01/23/24 1935)    Current Vitals:   Blood Pressure: 115/99 (01/25/24 0713)  Pulse: 76 (01/25/24 0808)  Temperature: 97.7 °F (36.5 °C) (01/25/24 0713)  Temp Source: Oral (01/23/24 2105)  Respirations: 18 (01/25/24 0713)  Height: 5' 1\" (154.9 cm) (01/23/24 2105)  Weight - Scale: 66.1 kg (145 lb 11.6 oz) (01/23/24 2105)  SpO2: 94 % (01/25/24 0808)    /99   Pulse 76   Temp 97.7 °F (36.5 °C)   Resp 18   Ht 5' 1\" (1.549 m)   Wt 66.1 kg (145 lb 11.6 oz)   SpO2 94%   BMI 27.53 kg/m²      Physical Exam    General Appearance:    Alert, cooperative, no distress   Head:    Normocephalic, without obvious abnormality, atraumatic   Neck:   Supple, symmetrical, trachea midline   Lungs:     Respirations unlabored, no audible wheezes   Abdomen:     Soft, non-tender   Lower Extremities:    Vascular:   Nonpalpable pedal pulses bilaterally.    Dermatological:  Right medial and posterior ankle ulcers,  =Posterior ulcer with exposed Achilles tendon.  Tendon is fibrotic and necrotic. Right medial ulcer probable to fascia with fibrotic and necrotic wound base. Right lateral ulcer ankle with scab.    Right second toe ulcer probable to bone distal tip.  Toe is edematous and positive for erythema. No active drainage.     Left lateral ankle ulcer with scab well adhered.    Neurological:  Gross sensation is intact. Protective sensation is diminished. Patient Reports numbness and/or paresthesias.    Lab Results:   Admission on 01/23/2024   Component Date Value    WBC 01/23/2024 12.73 (H)     RBC 01/23/2024 4.45     Hemoglobin 01/23/2024 14.0     Hematocrit 01/23/2024 42.7     MCV 01/23/2024 96     MCH 01/23/2024 31.5     MCHC 01/23/2024 32.8     RDW 01/23/2024 12.7     MPV 01/23/2024 8.9     Platelets 01/23/2024 404 (H)     nRBC 01/23/2024 0     Neutrophils Relative 01/23/2024 63     " Immat GRANS % 01/23/2024 0     Lymphocytes Relative 01/23/2024 27     Monocytes Relative 01/23/2024 6     Eosinophils Relative 01/23/2024 3     Basophils Relative 01/23/2024 1     Neutrophils Absolute 01/23/2024 8.04 (H)     Immature Grans Absolute 01/23/2024 0.03     Lymphocytes Absolute 01/23/2024 3.49     Monocytes Absolute 01/23/2024 0.76     Eosinophils Absolute 01/23/2024 0.35     Basophils Absolute 01/23/2024 0.06     Protime 01/23/2024 13.6     INR 01/23/2024 1.03     PTT 01/23/2024 29     Sodium 01/23/2024 135     Potassium 01/23/2024 4.5     Chloride 01/23/2024 99     CO2 01/23/2024 25     ANION GAP 01/23/2024 11     BUN 01/23/2024 12     Creatinine 01/23/2024 0.54 (L)     Glucose 01/23/2024 120     Calcium 01/23/2024 10.3 (H)     AST 01/23/2024 10 (L)     ALT 01/23/2024 7     Alkaline Phosphatase 01/23/2024 64     Total Protein 01/23/2024 7.3     Albumin 01/23/2024 4.2     Total Bilirubin 01/23/2024 0.21     eGFR 01/23/2024 91     Blood Culture 01/23/2024 Received in Microbiology Lab. Culture in Progress.     Blood Culture 01/23/2024 Received in Microbiology Lab. Culture in Progress.     Gram Stain Result 01/23/2024 No Polys or Bacteria seen     Total CK 01/23/2024 40     hs TnI 0hr 01/23/2024 4     LACTIC ACID 01/23/2024 3.3 (HH)     Procalcitonin 01/23/2024 <0.05     hs TnI 2hr 01/23/2024 4     Delta 2hr hsTnI 01/23/2024 0     LACTIC ACID 01/23/2024 1.7     Ventricular Rate 01/23/2024 78     Atrial Rate 01/23/2024 78     IN Interval 01/23/2024 178     QRSD Interval 01/23/2024 102     QT Interval 01/23/2024 356     QTC Interval 01/23/2024 405     P [place] 01/23/2024 57     QRS Axis 01/23/2024 -28     T Wave Axis 01/23/2024 80     Sed Rate 01/23/2024 38 (H)     CRP 01/23/2024 24.2 (H)     POC Glucose 01/23/2024 87     Sodium 01/24/2024 137     Potassium 01/24/2024 4.4     Chloride 01/24/2024 104     CO2 01/24/2024 26     ANION GAP 01/24/2024 7     BUN 01/24/2024 10     Creatinine 01/24/2024 0.53 (L)      Glucose 01/24/2024 113     Calcium 01/24/2024 10.1     eGFR 01/24/2024 91     WBC 01/24/2024 9.80     RBC 01/24/2024 4.41     Hemoglobin 01/24/2024 13.9     Hematocrit 01/24/2024 42.7     MCV 01/24/2024 97     MCH 01/24/2024 31.5     MCHC 01/24/2024 32.6     RDW 01/24/2024 12.8     Platelets 01/24/2024 363     MPV 01/24/2024 8.8 (L)     Procalcitonin 01/24/2024 <0.05     POC Glucose 01/24/2024 91     POC Glucose 01/24/2024 121     POC Glucose 01/24/2024 73     POC Glucose 01/24/2024 98     Sodium 01/25/2024 139     Potassium 01/25/2024 4.7     Chloride 01/25/2024 108     CO2 01/25/2024 28     ANION GAP 01/25/2024 3     BUN 01/25/2024 9     Creatinine 01/25/2024 0.64     Glucose 01/25/2024 110     Calcium 01/25/2024 9.0     eGFR 01/25/2024 86     WBC 01/25/2024 8.44     RBC 01/25/2024 3.93     Hemoglobin 01/25/2024 12.2     Hematocrit 01/25/2024 39.0     MCV 01/25/2024 99 (H)     MCH 01/25/2024 31.0     MCHC 01/25/2024 31.3 (L)     RDW 01/25/2024 13.1     MPV 01/25/2024 8.9     Platelets 01/25/2024 328     nRBC 01/25/2024 0     Neutrophils Relative 01/25/2024 68     Immat GRANS % 01/25/2024 0     Lymphocytes Relative 01/25/2024 19     Monocytes Relative 01/25/2024 8     Eosinophils Relative 01/25/2024 4     Basophils Relative 01/25/2024 1     Neutrophils Absolute 01/25/2024 5.78     Immature Grans Absolute 01/25/2024 0.02     Lymphocytes Absolute 01/25/2024 1.59     Monocytes Absolute 01/25/2024 0.63     Eosinophils Absolute 01/25/2024 0.37     Basophils Absolute 01/25/2024 0.05     POC Glucose 01/25/2024 94        Results from last 7 days   Lab Units 01/23/24 1945   GRAM STAIN RESULT  No Polys or Bacteria seen       Results from last 7 days   Lab Units 01/23/24 1952 01/23/24 1940   BLOOD CULTURE  Received in Microbiology Lab. Culture in Progress. Received in Microbiology Lab. Culture in Progress.               Imaging: I have personally reviewed pertinent films in PACS  EKG, Pathology, and Other Studies: I have  "personally reviewed pertinent reports.      Code Status: Level 3 - DNAR and DNI      Portions of the record may have been created with voice recognition software. Occasional wrong word or \"sound a like\" substitutions may have occurred due to the inherent limitations of voice recognition software. Read the chart carefully and recognize, using context, where substitutions have occurred.          "

## 2024-01-25 NOTE — PROGRESS NOTES
"Progress Note - General Surgery   Lona Cedeno 78 y.o. female MRN: [id number]  Unit/Bed#: -01 Encounter: [id number]    Assessment:  78 year old female with PMH significant for DM, HTN, anxiety, GERD, COPD, tobacco abuse, hx of R ankle fracture s/p ORIF, PAD presents with R foot wounds   Afebrile, vitals stable   No leukocytosis   Hgb 12.2   Cr 0.64  Arterial duplex suggestive of aortioiliac disease   MRI R ankle: \"Medial ankle skin ulceration with mild reactive marrow edema at the medial malleolus without T1 replacement signal at this time. Findings are low suspicion for osteomyelitis. Posterior ankle skin ulceration with thinning of the adjacent Achilles tendon and mild critical zone tendinosis. There is no evidence of tear with an intact insertion. Chronic healed posterior and medial malleoli are fractures with screw fixation of a distal fibular fracture, the latter incompletely evaluated due to susceptibility artifacts.\"  Nonpalpable R dp and pt pulses. R foot wounds with dressings in place    Plan:  Await results for CTA with runoff for further vascular planning   Await podiatry consult regarding salvageability   Continue IV abx   Continue ASA, statin   Analgesia and antiemetics prn   Rest of medical management per SLIM  Discussed with on call vascular surgeon, Dr. Chavez via TT    Subjective/Objective   Subjective: Patient has no complaints at this time. Denies pain. Eager to have a plan.     Objective:   Blood pressure 115/99, pulse 76, temperature 97.7 °F (36.5 °C), resp. rate 18, height 5' 1\" (1.549 m), weight 66.1 kg (145 lb 11.6 oz), SpO2 94%.,Body mass index is 27.53 kg/m².      Intake/Output Summary (Last 24 hours) at 1/25/2024 0901  Last data filed at 1/25/2024 0203  Gross per 24 hour   Intake 2476.67 ml   Output --   Net 2476.67 ml       Invasive Devices       Peripheral Intravenous Line  Duration             Peripheral IV 01/23/24 Right;Ventral (anterior) Forearm 1 day              Drain  Duration "             Ureteral Internal Stent Left ureter 6 Fr. 65 days                    Physical Exam  Vitals reviewed.   Constitutional:       General: She is not in acute distress.     Appearance: She is obese. She is not ill-appearing.   HENT:      Head: Normocephalic and atraumatic.   Cardiovascular:      Rate and Rhythm: Normal rate and regular rhythm.   Pulmonary:      Effort: Pulmonary effort is normal. No respiratory distress.   Abdominal:      General: There is no distension.      Palpations: Abdomen is soft.      Tenderness: There is no abdominal tenderness. There is no guarding.   Musculoskeletal:      Right lower leg: No edema.      Left lower leg: No edema.      Comments: Nonpalpable bilateral dp/pt pulses. R foot wounds present and wrapped with dressing. Sensation intact of RLE. R foot and RLE nontender to palpation    Skin:     General: Skin is warm and dry.   Neurological:      General: No focal deficit present.      Mental Status: She is alert. Mental status is at baseline.   Psychiatric:         Mood and Affect: Mood normal.         Behavior: Behavior normal.          Lab, Imaging and other studies:I have personally reviewed pertinent lab results.  , CBC:   Lab Results   Component Value Date    WBC 8.44 01/25/2024    HGB 12.2 01/25/2024    HCT 39.0 01/25/2024    MCV 99 (H) 01/25/2024     01/25/2024    RBC 3.93 01/25/2024    MCH 31.0 01/25/2024    MCHC 31.3 (L) 01/25/2024    RDW 13.1 01/25/2024    MPV 8.9 01/25/2024    NRBC 0 01/25/2024   , CMP:   Lab Results   Component Value Date    SODIUM 139 01/25/2024    K 4.7 01/25/2024     01/25/2024    CO2 28 01/25/2024    BUN 9 01/25/2024    CREATININE 0.64 01/25/2024    CALCIUM 9.0 01/25/2024    EGFR 86 01/25/2024     VTE Pharmacologic Prophylaxis: Enoxaparin (Lovenox)  VTE Mechanical Prophylaxis: sequential compression device    Shaneka Yancey PA-C

## 2024-01-25 NOTE — ASSESSMENT & PLAN NOTE
Severe.  Revascularization being considered.  Patient is ambivalent although I stressed to her that this will be important to salvage her lower leg.

## 2024-01-25 NOTE — CONSULTS
[facility]  Consult  Name: Lona Cedeno 78 y.o. female I MRN: [id number]  Unit/Bed#: -01 I Date of Admission: 1/23/2024   Date of Service: 1/25/2024 I Hospital Day: 2    Inpatient consult to Cardiology  Consult performed by: Alexander Cespedes MD  Consult ordered by: Sandra King PA-C          Assessment/Plan   Preoperative cardiovascular examination  Assessment & Plan  Patient has multiple problems including some underlying lung disease.  There is no overt coronary disease at least by symptoms.  She states she can walk up 2 flights of steps.    She would certainly not be interested in cardiac revascularization for asymptomatic CAD.    Having said that, at this point her symptomatic problem is her leg and in my opinion it is reasonable to proceed with whatever procedure is needed for the most durable results including surgical abdominal approach.    PAD (peripheral artery disease) (HCC)  Assessment & Plan  Severe.  Revascularization being considered.  Patient is ambivalent although I stressed to her that this will be important to salvage her lower leg.           Other summary comments:   We would be happy to see the patient again should the need arise.    Outpatient Cardiologist: I have seen the patient in the past.    HPI: Lona Cedeno is a 78 y.o. year old female who presented with right foot wounds.  Evaluation is ongoing but there is significant aortoiliac disease contributing to ischemia.  Surgery is being contemplated.  I have seen the patient in the past and reviewed my relatively recent note.  There is no chest pain or chest pressure.  There have been PVCs in the past when the potassium level was very low.  Patient is a significant cigarette smoker.  No syncope or near syncope.        EKG:   EKG 1/23/2024: Sinus rhythm.  Normal.  No PVCs are seen.    MOST  RECENT CARDIAC IMAGING:   TTE 8/14/2023: Normal LV systolic function.      Review of Systems: a 10 point review of  systems was conducted and is negative except for as mentioned in the HPI or as below.        Historical Information   Past Medical History:   Diagnosis Date    Anxiety     Closed fracture of coccyx (HCC) 2023    Diabetes mellitus (HCC)     GERD (gastroesophageal reflux disease)     Hyperlipidemia     Hypertension     IBS (irritable bowel syndrome)     Shoulder fracture      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;  Surgeon: Cristian Child MD;  Location: [facility];  Service: Urology    FL RETROGRADE PYELOGRAM  2023    HERNIA REPAIR      umbilicus w/ mesh     Social History     Substance and Sexual Activity   Alcohol Use Not Currently     Social History     Substance and Sexual Activity   Drug Use Never     Social History     Tobacco Use   Smoking Status Every Day    Current packs/day: 1.00    Average packs/day: 1 pack/day for 63.1 years (63.1 ttl pk-yrs)    Types: Cigarettes    Start date:    Smokeless Tobacco Never   Tobacco Comments    23 smokes about 3/4 PPD       Family History:   No longer relevant.    Meds/Allergies   all current active meds have been reviewed  Medications Prior to Admission   Medication    acetaminophen (TYLENOL) 325 mg tablet    albuterol (Ventolin HFA) 90 mcg/act inhaler    AMILoride 5 mg tablet    Apoaequorin (Prevagen) 10 MG CAPS    Aspirin (ASPIR-81 PO)    atorvastatin (LIPITOR) 10 mg tablet    Blood Glucose Monitoring Suppl (ONETOUCH VERIO) w/Device KIT    buPROPion (WELLBUTRIN XL) 300 mg 24 hr  "tablet    clonazePAM (KlonoPIN) 1 mg tablet    Fluticasone-Salmeterol (Advair Diskus) 250-50 mcg/dose inhaler    glucose blood (OneTouch Verio) test strip    Januvia 100 MG tablet    Lancets (OneTouch Delica Plus [id number]) MISC    lisinopril (ZESTRIL) 5 mg tablet    metFORMIN (GLUCOPHAGE-XR) 500 mg 24 hr tablet    Misc. Devices (Carex Coccyx Cushion) MISC    Omega-3 Fatty Acids (FISH OIL PO)    pantoprazole (PROTONIX) 40 mg tablet    potassium chloride (K-DUR,KLOR-CON) 10 mEq tablet    pregabalin (LYRICA) 200 MG capsule    solifenacin (VESICARE) 5 mg tablet       Allergies   Allergen Reactions    Paroxetine Other (See Comments)     Became suicidal    Adhesive [Medical Tape] Itching and Blisters    Carafate [Sucralfate] Other (See Comments)     Mouth sores, tongue sore    Sulfa Antibiotics Hives and Rash    Sulfamethoxazole-Trimethoprim Hives       Objective   Vitals: Blood pressure 115/99, pulse 76, temperature 97.7 °F (36.5 °C), resp. rate 18, height 5' 1\" (1.549 m), weight 66.1 kg (145 lb 11.6 oz), SpO2 94%., Body mass index is 27.53 kg/m².,   Orthostatic Blood Pressures      Flowsheet Row Most Recent Value   Blood Pressure 115/99 filed at 2024 0713   Patient Position - Orthostatic VS Lying filed at 2024 2105            Systolic (24hrs), Av , Min:107 , Max:115     Diastolic (24hrs), Av, Min:50, Max:99              Physical Exam:    General:  Normal appearance in no distress.  Eyes:  Anicteric.  Oral mucosa:  Moist.  Neck:  No JVD. Carotid upstrokes are brisk without bruits.  No masses.  Chest:  Clear to auscultation.  Cardiac:  No palpable PMI.  Normal S1 and S2.  No murmur gallop or rub.  Abdomen:  Soft and nontender. No palpable organomegaly or aortic enlargement.  Extremities: Right lower leg is bandaged.  Musculoskeletal:  Symmetric.   Vascular:  Femoral popliteal and pedal pulses are absent.  .  Neuro:  Grossly symmetric.  Psych:  Alert and oriented x3.        Lab Results:     Troponins: "    Results from last 7 days   Lab Units 01/23/24 2223 01/23/24 1940   HS TNI 0HR ng/L  --  4   HS TNI 2HR ng/L 4  --    HSTNI D2 ng/L 0  --      BNP:       CBC :   Results from last 7 days   Lab Units 01/25/24  0426 01/24/24  0505   WBC Thousand/uL 8.44 9.80   HEMOGLOBIN g/dL 12.2 13.9   HEMATOCRIT % 39.0 42.7   MCV fL 99* 97   PLATELETS Thousands/uL 328 363     TSH:     CMP:   Results from last 7 days   Lab Units 01/25/24 0426 01/24/24  0505 01/23/24 1940   POTASSIUM mmol/L 4.7 4.4 4.5   CHLORIDE mmol/L 108 104 99   CO2 mmol/L 28 26 25   BUN mg/dL 9 10 12   CREATININE mg/dL 0.64 0.53* 0.54*   AST U/L  --   --  10*   ALT U/L  --   --  7   EGFR ml/min/1.73sq m 86 91 91     Lipid Profile:     Coags:   Results from last 7 days   Lab Units 01/23/24 1940   INR  1.03

## 2024-01-25 NOTE — ASSESSMENT & PLAN NOTE
Lab Results   Component Value Date    HGBA1C 6.5 (H) 11/07/2023       Recent Labs     01/24/24  1058 01/24/24  1556 01/24/24  2224 01/25/24  0711   POCGLU 121 73 98 94         Blood Sugar Average: Last 72 hrs:  (P) 94  Target blood sugar for the hospital is 140-180  Blood sugars are stable at this time  Continue Accu-Cheks before meals and at bedtime with sliding scale coverage  Oral hypoglycemics remain on hold  Diabetic neuropathy-continue Lyrica

## 2024-01-25 NOTE — PLAN OF CARE
Problem: Prexisting or High Potential for Compromised Skin Integrity  Goal: Skin integrity is maintained or improved  Description: INTERVENTIONS:  - Identify patients at risk for skin breakdown  - Assess and monitor skin integrity  - Assess and monitor nutrition and hydration status  - Monitor labs   - Assess for incontinence   - Turn and reposition patient  - Assist with mobility/ambulation  - Relieve pressure over bony prominences  - Avoid friction and shearing  - Provide appropriate hygiene as needed including keeping skin clean and dry  - Evaluate need for skin moisturizer/barrier cream  - Collaborate with interdisciplinary team   - Patient/family teaching  - Consider wound care consult   Outcome: Progressing     Problem: INFECTION - ADULT  Goal: Absence or prevention of progression during hospitalization  Description: INTERVENTIONS:  - Assess and monitor for signs and symptoms of infection  - Monitor lab/diagnostic results  - Monitor all insertion sites, i.e. indwelling lines, tubes, and drains  - Monitor endotracheal if appropriate and nasal secretions for changes in amount and color  - [place] appropriate cooling/warming therapies per order  - Administer medications as ordered  - Instruct and encourage patient and family to use good hand hygiene technique  - Identify and instruct in appropriate isolation precautions for identified infection/condition  Outcome: Progressing  Goal: Absence of fever/infection during neutropenic period  Description: INTERVENTIONS:  - Monitor WBC    Outcome: Progressing

## 2024-01-26 ENCOUNTER — HOSPITAL ENCOUNTER (INPATIENT)
Facility: HOSPITAL | Age: 79
LOS: 14 days | DRG: 253 | End: 2024-02-09
Attending: STUDENT IN AN ORGANIZED HEALTH CARE EDUCATION/TRAINING PROGRAM | Admitting: GENERAL PRACTICE
Payer: COMMERCIAL

## 2024-01-26 VITALS
SYSTOLIC BLOOD PRESSURE: 130 MMHG | HEIGHT: 61 IN | OXYGEN SATURATION: 92 % | BODY MASS INDEX: 27.51 KG/M2 | TEMPERATURE: 98.2 F | RESPIRATION RATE: 16 BRPM | DIASTOLIC BLOOD PRESSURE: 57 MMHG | WEIGHT: 145.72 LBS | HEART RATE: 71 BPM

## 2024-01-26 DIAGNOSIS — E11.621 DIABETIC FOOT ULCER WITH OSTEOMYELITIS (HCC): ICD-10-CM

## 2024-01-26 DIAGNOSIS — L97.509 DIABETIC FOOT ULCER WITH OSTEOMYELITIS (HCC): ICD-10-CM

## 2024-01-26 DIAGNOSIS — M86.9 DIABETIC FOOT ULCER WITH OSTEOMYELITIS (HCC): ICD-10-CM

## 2024-01-26 DIAGNOSIS — S86.001A ACHILLES TENDON INJURY, RIGHT, INITIAL ENCOUNTER: ICD-10-CM

## 2024-01-26 DIAGNOSIS — L97.312 ANKLE ULCER, RIGHT, WITH FAT LAYER EXPOSED (HCC): ICD-10-CM

## 2024-01-26 DIAGNOSIS — I73.9 PAD (PERIPHERAL ARTERY DISEASE) (HCC): Primary | ICD-10-CM

## 2024-01-26 DIAGNOSIS — F17.200 TOBACCO USE DISORDER: Chronic | ICD-10-CM

## 2024-01-26 DIAGNOSIS — E11.69 DIABETIC FOOT ULCER WITH OSTEOMYELITIS (HCC): ICD-10-CM

## 2024-01-26 PROBLEM — K59.00 CONSTIPATION: Status: ACTIVE | Noted: 2024-01-26

## 2024-01-26 LAB
ANION GAP SERPL CALCULATED.3IONS-SCNC: 5 MMOL/L
BACTERIA WND AEROBE CULT: NORMAL
BASOPHILS # BLD AUTO: 0.04 THOUSANDS/ÂΜL (ref 0–0.1)
BASOPHILS NFR BLD AUTO: 1 % (ref 0–1)
BUN SERPL-MCNC: 11 MG/DL (ref 5–25)
CALCIUM SERPL-MCNC: 9.1 MG/DL (ref 8.4–10.2)
CHLORIDE SERPL-SCNC: 105 MMOL/L (ref 96–108)
CO2 SERPL-SCNC: 27 MMOL/L (ref 21–32)
CREAT SERPL-MCNC: 0.48 MG/DL (ref 0.6–1.3)
EOSINOPHIL # BLD AUTO: 0.4 THOUSAND/ÂΜL (ref 0–0.61)
EOSINOPHIL NFR BLD AUTO: 5 % (ref 0–6)
ERYTHROCYTE [DISTWIDTH] IN BLOOD BY AUTOMATED COUNT: 12.6 % (ref 11.6–15.1)
GFR SERPL CREATININE-BSD FRML MDRD: 94 ML/MIN/1.73SQ M
GLUCOSE SERPL-MCNC: 115 MG/DL (ref 65–140)
GLUCOSE SERPL-MCNC: 117 MG/DL (ref 65–140)
GLUCOSE SERPL-MCNC: 119 MG/DL (ref 65–140)
GLUCOSE SERPL-MCNC: 144 MG/DL (ref 65–140)
GLUCOSE SERPL-MCNC: 154 MG/DL (ref 65–140)
GRAM STN SPEC: NORMAL
HCT VFR BLD AUTO: 36.7 % (ref 34.8–46.1)
HGB BLD-MCNC: 12.1 G/DL (ref 11.5–15.4)
IMM GRANULOCYTES # BLD AUTO: 0.02 THOUSAND/UL (ref 0–0.2)
IMM GRANULOCYTES NFR BLD AUTO: 0 % (ref 0–2)
LYMPHOCYTES # BLD AUTO: 1.52 THOUSANDS/ÂΜL (ref 0.6–4.47)
LYMPHOCYTES NFR BLD AUTO: 21 % (ref 14–44)
MCH RBC QN AUTO: 31.9 PG (ref 26.8–34.3)
MCHC RBC AUTO-ENTMCNC: 33 G/DL (ref 31.4–37.4)
MCV RBC AUTO: 97 FL (ref 82–98)
MONOCYTES # BLD AUTO: 0.53 THOUSAND/ÂΜL (ref 0.17–1.22)
MONOCYTES NFR BLD AUTO: 7 % (ref 4–12)
NEUTROPHILS # BLD AUTO: 4.87 THOUSANDS/ÂΜL (ref 1.85–7.62)
NEUTS SEG NFR BLD AUTO: 66 % (ref 43–75)
NRBC BLD AUTO-RTO: 0 /100 WBCS
PLATELET # BLD AUTO: 322 THOUSANDS/UL (ref 149–390)
PMV BLD AUTO: 9 FL (ref 8.9–12.7)
POTASSIUM SERPL-SCNC: 4.1 MMOL/L (ref 3.5–5.3)
RBC # BLD AUTO: 3.79 MILLION/UL (ref 3.81–5.12)
SODIUM SERPL-SCNC: 137 MMOL/L (ref 135–147)
WBC # BLD AUTO: 7.38 THOUSAND/UL (ref 4.31–10.16)

## 2024-01-26 PROCEDURE — 99232 SBSQ HOSP IP/OBS MODERATE 35: CPT | Performed by: HOSPITALIST

## 2024-01-26 PROCEDURE — 80048 BASIC METABOLIC PNL TOTAL CA: CPT | Performed by: HOSPITALIST

## 2024-01-26 PROCEDURE — 85025 COMPLETE CBC W/AUTO DIFF WBC: CPT | Performed by: HOSPITALIST

## 2024-01-26 PROCEDURE — 99239 HOSP IP/OBS DSCHRG MGMT >30: CPT | Performed by: HOSPITALIST

## 2024-01-26 PROCEDURE — 99232 SBSQ HOSP IP/OBS MODERATE 35: CPT | Performed by: STUDENT IN AN ORGANIZED HEALTH CARE EDUCATION/TRAINING PROGRAM

## 2024-01-26 PROCEDURE — 82948 REAGENT STRIP/BLOOD GLUCOSE: CPT

## 2024-01-26 PROCEDURE — 99232 SBSQ HOSP IP/OBS MODERATE 35: CPT | Performed by: SURGERY

## 2024-01-26 PROCEDURE — 99223 1ST HOSP IP/OBS HIGH 75: CPT | Performed by: GENERAL PRACTICE

## 2024-01-26 RX ORDER — LISINOPRIL 5 MG/1
5 TABLET ORAL DAILY
Status: DISCONTINUED | OUTPATIENT
Start: 2024-01-27 | End: 2024-01-29

## 2024-01-26 RX ORDER — ENOXAPARIN SODIUM 100 MG/ML
40 INJECTION SUBCUTANEOUS DAILY
Status: DISCONTINUED | OUTPATIENT
Start: 2024-01-27 | End: 2024-02-01

## 2024-01-26 RX ORDER — CHLORAL HYDRATE 500 MG
1000 CAPSULE ORAL DAILY
Status: DISCONTINUED | OUTPATIENT
Start: 2024-01-27 | End: 2024-02-09 | Stop reason: HOSPADM

## 2024-01-26 RX ORDER — ALBUTEROL SULFATE 90 UG/1
2 AEROSOL, METERED RESPIRATORY (INHALATION) EVERY 6 HOURS PRN
Status: CANCELLED | OUTPATIENT
Start: 2024-01-26

## 2024-01-26 RX ORDER — INSULIN LISPRO 100 [IU]/ML
1-5 INJECTION, SOLUTION INTRAVENOUS; SUBCUTANEOUS
Status: DISCONTINUED | OUTPATIENT
Start: 2024-01-27 | End: 2024-02-09 | Stop reason: HOSPADM

## 2024-01-26 RX ORDER — INSULIN LISPRO 100 [IU]/ML
1-5 INJECTION, SOLUTION INTRAVENOUS; SUBCUTANEOUS
Status: DISCONTINUED | OUTPATIENT
Start: 2024-01-26 | End: 2024-02-09 | Stop reason: HOSPADM

## 2024-01-26 RX ORDER — ACETAMINOPHEN 325 MG/1
650 TABLET ORAL EVERY 4 HOURS PRN
Status: CANCELLED | OUTPATIENT
Start: 2024-01-26

## 2024-01-26 RX ORDER — ONDANSETRON 2 MG/ML
4 INJECTION INTRAMUSCULAR; INTRAVENOUS EVERY 6 HOURS PRN
Status: DISCONTINUED | OUTPATIENT
Start: 2024-01-26 | End: 2024-02-09 | Stop reason: HOSPADM

## 2024-01-26 RX ORDER — PANTOPRAZOLE SODIUM 40 MG/1
40 TABLET, DELAYED RELEASE ORAL
Status: DISCONTINUED | OUTPATIENT
Start: 2024-01-27 | End: 2024-02-09 | Stop reason: HOSPADM

## 2024-01-26 RX ORDER — PANTOPRAZOLE SODIUM 40 MG/1
40 TABLET, DELAYED RELEASE ORAL
Status: CANCELLED | OUTPATIENT
Start: 2024-01-27

## 2024-01-26 RX ORDER — INSULIN LISPRO 100 [IU]/ML
5 INJECTION, SOLUTION INTRAVENOUS; SUBCUTANEOUS
Status: DISCONTINUED | OUTPATIENT
Start: 2024-01-27 | End: 2024-02-09 | Stop reason: HOSPADM

## 2024-01-26 RX ORDER — ATORVASTATIN CALCIUM 10 MG/1
10 TABLET, FILM COATED ORAL
Status: CANCELLED | OUTPATIENT
Start: 2024-01-27

## 2024-01-26 RX ORDER — INSULIN LISPRO 100 [IU]/ML
1-5 INJECTION, SOLUTION INTRAVENOUS; SUBCUTANEOUS
Status: CANCELLED | OUTPATIENT
Start: 2024-01-27

## 2024-01-26 RX ORDER — INSULIN LISPRO 100 [IU]/ML
5 INJECTION, SOLUTION INTRAVENOUS; SUBCUTANEOUS
Status: CANCELLED | OUTPATIENT
Start: 2024-01-27

## 2024-01-26 RX ORDER — CHLORAL HYDRATE 500 MG
1000 CAPSULE ORAL DAILY
Status: CANCELLED | OUTPATIENT
Start: 2024-01-27

## 2024-01-26 RX ORDER — ATORVASTATIN CALCIUM 10 MG/1
10 TABLET, FILM COATED ORAL
Status: DISCONTINUED | OUTPATIENT
Start: 2024-01-27 | End: 2024-02-09 | Stop reason: HOSPADM

## 2024-01-26 RX ORDER — CLONAZEPAM 1 MG/1
1 TABLET ORAL EVERY EVENING
Status: CANCELLED | OUTPATIENT
Start: 2024-01-26

## 2024-01-26 RX ORDER — FLUTICASONE FUROATE AND VILANTEROL 200; 25 UG/1; UG/1
1 POWDER RESPIRATORY (INHALATION)
Status: DISCONTINUED | OUTPATIENT
Start: 2024-01-27 | End: 2024-02-09 | Stop reason: HOSPADM

## 2024-01-26 RX ORDER — AMILORIDE HYDROCHLORIDE 5 MG/1
5 TABLET ORAL DAILY
Status: CANCELLED | OUTPATIENT
Start: 2024-01-27

## 2024-01-26 RX ORDER — ACETAMINOPHEN 325 MG/1
650 TABLET ORAL EVERY 4 HOURS PRN
Status: DISCONTINUED | OUTPATIENT
Start: 2024-01-26 | End: 2024-01-30

## 2024-01-26 RX ORDER — DOCUSATE SODIUM 100 MG/1
100 CAPSULE, LIQUID FILLED ORAL 2 TIMES DAILY
Status: DISCONTINUED | OUTPATIENT
Start: 2024-01-26 | End: 2024-02-06

## 2024-01-26 RX ORDER — SENNOSIDES 8.6 MG
1 TABLET ORAL
Status: DISCONTINUED | OUTPATIENT
Start: 2024-01-26 | End: 2024-02-07

## 2024-01-26 RX ORDER — NICOTINE 21 MG/24HR
1 PATCH, TRANSDERMAL 24 HOURS TRANSDERMAL DAILY
Status: DISCONTINUED | OUTPATIENT
Start: 2024-01-26 | End: 2024-02-09 | Stop reason: HOSPADM

## 2024-01-26 RX ORDER — NICOTINE 21 MG/24HR
1 PATCH, TRANSDERMAL 24 HOURS TRANSDERMAL DAILY
Status: CANCELLED | OUTPATIENT
Start: 2024-01-27

## 2024-01-26 RX ORDER — ENOXAPARIN SODIUM 100 MG/ML
40 INJECTION SUBCUTANEOUS DAILY
Status: CANCELLED | OUTPATIENT
Start: 2024-01-27

## 2024-01-26 RX ORDER — ALBUTEROL SULFATE 90 UG/1
2 AEROSOL, METERED RESPIRATORY (INHALATION) EVERY 6 HOURS PRN
Status: DISCONTINUED | OUTPATIENT
Start: 2024-01-26 | End: 2024-02-09 | Stop reason: HOSPADM

## 2024-01-26 RX ORDER — BUPROPION HYDROCHLORIDE 150 MG/1
300 TABLET ORAL DAILY
Status: DISCONTINUED | OUTPATIENT
Start: 2024-01-27 | End: 2024-02-09 | Stop reason: HOSPADM

## 2024-01-26 RX ORDER — ONDANSETRON 2 MG/ML
4 INJECTION INTRAMUSCULAR; INTRAVENOUS EVERY 6 HOURS PRN
Status: CANCELLED | OUTPATIENT
Start: 2024-01-26

## 2024-01-26 RX ORDER — LISINOPRIL 5 MG/1
5 TABLET ORAL DAILY
Status: CANCELLED | OUTPATIENT
Start: 2024-01-27

## 2024-01-26 RX ORDER — CLONAZEPAM 1 MG/1
1 TABLET ORAL EVERY EVENING
Status: DISCONTINUED | OUTPATIENT
Start: 2024-01-27 | End: 2024-02-09 | Stop reason: HOSPADM

## 2024-01-26 RX ORDER — PREGABALIN 100 MG/1
200 CAPSULE ORAL 3 TIMES DAILY
Status: CANCELLED | OUTPATIENT
Start: 2024-01-26

## 2024-01-26 RX ORDER — FLUTICASONE FUROATE AND VILANTEROL 200; 25 UG/1; UG/1
1 POWDER RESPIRATORY (INHALATION)
Status: CANCELLED | OUTPATIENT
Start: 2024-01-27

## 2024-01-26 RX ORDER — OXYBUTYNIN CHLORIDE 5 MG/1
5 TABLET, EXTENDED RELEASE ORAL DAILY
Status: CANCELLED | OUTPATIENT
Start: 2024-01-27

## 2024-01-26 RX ORDER — AMILORIDE HYDROCHLORIDE 5 MG/1
5 TABLET ORAL DAILY
Status: DISCONTINUED | OUTPATIENT
Start: 2024-01-27 | End: 2024-02-09 | Stop reason: HOSPADM

## 2024-01-26 RX ORDER — BUPROPION HYDROCHLORIDE 150 MG/1
300 TABLET ORAL DAILY
Status: CANCELLED | OUTPATIENT
Start: 2024-01-27

## 2024-01-26 RX ORDER — PREGABALIN 100 MG/1
200 CAPSULE ORAL 3 TIMES DAILY
Status: DISCONTINUED | OUTPATIENT
Start: 2024-01-26 | End: 2024-02-09 | Stop reason: HOSPADM

## 2024-01-26 RX ORDER — OXYBUTYNIN CHLORIDE 5 MG/1
5 TABLET, EXTENDED RELEASE ORAL DAILY
Status: DISCONTINUED | OUTPATIENT
Start: 2024-01-27 | End: 2024-02-09 | Stop reason: HOSPADM

## 2024-01-26 RX ORDER — INSULIN LISPRO 100 [IU]/ML
1-5 INJECTION, SOLUTION INTRAVENOUS; SUBCUTANEOUS
Status: CANCELLED | OUTPATIENT
Start: 2024-01-26

## 2024-01-26 RX ADMIN — BUPROPION HYDROCHLORIDE 300 MG: 150 TABLET, EXTENDED RELEASE ORAL at 08:40

## 2024-01-26 RX ADMIN — PREGABALIN 200 MG: 100 CAPSULE ORAL at 21:58

## 2024-01-26 RX ADMIN — AMILORIDE HYDROCLORIDE 5 MG: 5 TABLET ORAL at 08:41

## 2024-01-26 RX ADMIN — SODIUM CHLORIDE 100 ML/HR: 0.9 INJECTION, SOLUTION INTRAVENOUS at 06:23

## 2024-01-26 RX ADMIN — INSULIN LISPRO 5 UNITS: 100 INJECTION, SOLUTION INTRAVENOUS; SUBCUTANEOUS at 17:30

## 2024-01-26 RX ADMIN — DOCUSATE SODIUM 100 MG: 100 CAPSULE, LIQUID FILLED ORAL at 21:58

## 2024-01-26 RX ADMIN — INSULIN LISPRO 5 UNITS: 100 INJECTION, SOLUTION INTRAVENOUS; SUBCUTANEOUS at 12:25

## 2024-01-26 RX ADMIN — PREGABALIN 200 MG: 100 CAPSULE ORAL at 08:40

## 2024-01-26 RX ADMIN — INSULIN LISPRO 1 UNITS: 100 INJECTION, SOLUTION INTRAVENOUS; SUBCUTANEOUS at 17:30

## 2024-01-26 RX ADMIN — ASPIRIN 81 MG: 81 TABLET, COATED ORAL at 08:40

## 2024-01-26 RX ADMIN — ATORVASTATIN CALCIUM 10 MG: 10 TABLET, FILM COATED ORAL at 16:05

## 2024-01-26 RX ADMIN — FLUTICASONE FUROATE AND VILANTEROL TRIFENATATE 1 PUFF: 200; 25 POWDER RESPIRATORY (INHALATION) at 08:40

## 2024-01-26 RX ADMIN — SENNOSIDES 8.6 MG: 8.6 TABLET, FILM COATED ORAL at 21:58

## 2024-01-26 RX ADMIN — PREGABALIN 200 MG: 100 CAPSULE ORAL at 16:04

## 2024-01-26 RX ADMIN — INSULIN LISPRO 5 UNITS: 100 INJECTION, SOLUTION INTRAVENOUS; SUBCUTANEOUS at 08:42

## 2024-01-26 RX ADMIN — CLONAZEPAM 1 MG: 1 TABLET ORAL at 17:42

## 2024-01-26 RX ADMIN — NICOTINE 1 PATCH: 21 PATCH, EXTENDED RELEASE TRANSDERMAL at 21:58

## 2024-01-26 RX ADMIN — LISINOPRIL 5 MG: 5 TABLET ORAL at 08:40

## 2024-01-26 RX ADMIN — OMEGA-3 FATTY ACIDS CAP 1000 MG 1000 MG: 1000 CAP at 08:40

## 2024-01-26 RX ADMIN — OXYBUTYNIN CHLORIDE 5 MG: 5 TABLET, EXTENDED RELEASE ORAL at 08:40

## 2024-01-26 RX ADMIN — PANTOPRAZOLE SODIUM 40 MG: 40 TABLET, DELAYED RELEASE ORAL at 08:30

## 2024-01-26 RX ADMIN — ENOXAPARIN SODIUM 40 MG: 40 INJECTION SUBCUTANEOUS at 08:45

## 2024-01-26 NOTE — PROGRESS NOTES
Progress Note - Infectious Disease   Lona Cedeno 78 y.o. female MRN: [id number]  Unit/Bed#: -01 Encounter: [id number]      Impression/Plan:    1. Right ankle nonhealing wounds with exposed Achilles tendon. The patient has had wounds present over the right Achilles tendon, medial ankle and second toe for many months. The Achilles tendon is now exposed. LEADS show diffuse disease throughout bilateral femoral-popliteal arteries, JOSE in the ischemic category and right great toe pressure of 0mmHg. CTA shows aortic plaque. Given exposed Achilles tendon, antibiotics will not be helpful without debridement and wound coverage. There is no acute soft tissue infection present requiring urgent antibiotics. Regardless, in the setting of severe occlusive vascular disease with JOSE's in the ischemic range, antibiotic delivery is poor and likely futile. The patient is hemodynamically stable, afebrile without leukocytosis.              -continue to monitor off antibiotics              -plan for transfer to [facility] for vascular surgery intervention              -Podiatry following              -Monitor wounds for developing signs of infection              -Repeat CBC tomorrow to monitor for infection     2.  PAD.  LEADS show severe bilateral occlusive disease with ABIs in the ischemic range.  Per report, CTA shows plaque throughout the visualized aorta.  Patient will need vascular intervention prior to any podiatric procedure, scheduled for transfer to [facility]              -Vascular surgery follow-up     3.  Type 2 diabetes mellitus with neuropathy.  Hemoglobin A1c 6.5%.  Still a risk factor for poor wound healing and infection.              -Glycemic management per primary team     4.  Tobacco abuse.  Patient continues to smoke.  This is a risk factor for poor wound healing.  Encourage cessation.     I have discussed the above management plan to monitor off antibiotics with Dr. Pretty of [facility]. Discussed with the patient at  bedside. Plan for transfer to [facility] tomorrow. ID can be reconsulted if needed after transfer to [facility].    Antibiotics:  Off antibiotics     Subjective:  The patient is feeling okay today, denies pain in the right leg. No fever, chills, nausea, vomiting. Dressing changed at bedside of right leg wounds evaluated.    Objective:  Vitals:  Temp:  [97.4 °F (36.3 °C)-98 °F (36.7 °C)] 98 °F (36.7 °C)  HR:  [77-79] 79  Resp:  [16-18] 16  BP: ()/(59-64) 135/63  SpO2:  [94 %-95 %] 95 %  Temp (24hrs), Av.7 °F (36.5 °C), Min:97.4 °F (36.3 °C), Max:98 °F (36.7 °C)  Current: Temperature: 98 °F (36.7 °C)    Physical Exam:   General Appearance:  Alert, interactive, nontoxic, no acute distress.   Throat: Oropharynx moist without lesions.    Lungs:   Clear to auscultation bilaterally; no wheezes, rhonchi or rales; respirations unlabored   Heart:  RRR; no murmur, rub or gallop   Abdomen:   Soft, non-tender, non-distended, positive bowel sounds.     Extremities: No clubbing, cyanosis or edema   Skin: Right heel wound with exposed Achilles tendon with surrounding slough. Ulceration over medial right ankle, right second toe mild swelling and distal ulceration. No cellulitis         Labs:   All pertinent labs and imaging studies were personally reviewed  Results from last 7 days   Lab Units 24  0443 24  0426 24  0505   WBC Thousand/uL 7.38 8.44 9.80   HEMOGLOBIN g/dL 12.1 12.2 13.9   PLATELETS Thousands/uL 322 328 363     Results from last 7 days   Lab Units 24  0443 24  0426 24  0505 24  1940   SODIUM mmol/L 137 139 137 135   POTASSIUM mmol/L 4.1 4.7 4.4 4.5   CHLORIDE mmol/L 105 108 104 99   CO2 mmol/L 27 28 26 25   BUN mg/dL 11 9 10 12   CREATININE mg/dL 0.48* 0.64 0.53* 0.54*   EGFR ml/min/1.73sq m 94 86 91 91   CALCIUM mg/dL 9.1 9.0 10.1 10.3*   AST U/L  --   --   --  10*   ALT U/L  --   --   --  7   ALK PHOS U/L  --   --   --  64     Results from last 7 days   Lab Units  01/24/24  0505 01/23/24 1940   PROCALCITONIN ng/ml <0.05 <0.05     Results from last 7 days   Lab Units 01/23/24 1940   CRP mg/L 24.2*               Micro:  Results from last 7 days   Lab Units 01/23/24 1952 01/23/24 1945 01/23/24 1940   BLOOD CULTURE  No Growth at 48 hrs.  --  No Growth at 48 hrs.   GRAM STAIN RESULT   --  No Polys or Bacteria seen  --    WOUND CULTURE   --  1+ Growth of  --        Imaging:  I have personally reviewed pertinent imaging study reports and images in PACS.     MRI right foot-second toe ulceration with cellulitis, distal phalanx osteomyelitis

## 2024-01-26 NOTE — ASSESSMENT & PLAN NOTE
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 for an [id number] bypass vs axillary-bifemoral bypass   Case reviewed with Dr. Bhatti with the [facility] internal medicine service at the [facility].  Patient has been accepted in transfer to [facility] for higher level of care and the need for surgery, however at this time we are still awaiting for a bed to be made available

## 2024-01-26 NOTE — PROGRESS NOTES
[facility]  Progress Note  Name: Lona Cedeno I  MRN: [id number]  Unit/Bed#: -01 I Date of Admission: 1/23/2024   Date of Service: 1/26/2024 I Hospital Day: 3    Assessment/Plan   * Diabetic foot ulcer with osteomyelitis (HCC)  Assessment & Plan  Patient with right second toe cellulitis  X-ray right foot-Extensive erosive changes and cortical irregularity involving the second distal phalanx concerning for osteomyelitis. Soft tissue swelling of the second toe is noted.   MRI right foot-Second toe distal skin ulceration with cellulitis as well as resorptive changes at the second distal tuft. The residual second distal phalanx exhibits increased T2 signal with T1 replacement signal concerning for osteomyelitis. There is also marrow edema. Second proximal phalanx without T1 replacement signal at this time; therefore, low suspicion for osteomyelitis.   Appreciate ID input, antibiotics have been discontinued  Appreciate podiatry input, no acute phase inpatient surgical intervention required until the patient has been cleared from a vascular surgical standpoint, after which there will be a plan to remove the second toe of the right  See the remaining details as outlined below    Ankle ulcer, right, with fat layer exposed (HCC)  Assessment & Plan  X-ray right foot-Large soft tissue wound superficial to the Achilles tendon.   MRI right ankle - 1. Medial ankle skin ulceration with mild reactive marrow edema at the medial malleolus without T1 replacement signal at this time. Findings are low suspicion for osteomyelitis. 2. Posterior ankle skin ulceration with thinning of the adjacent Achilles tendon and mild critical zone tendinosis. There is no evidence of tear with an intact insertion. 4. Chronic healed posterior and medial malleoli are fractures with screw fixation of a distal fibular fracture, the latter incompletely evaluated due to susceptibility artifacts.   Continue wound care  Continued  management as per podiatry, patient will need a podiatry evaluation upon arrival into the [facility]      PAD (peripheral artery disease) ([facility])  Assessment & Plan  Recent arterial Dopplers with diffuse disease throughout the femoral-popliteal arteries without significant focal stenosis  Appreciate vascular surgical consultation  Case reviewed with vascular surgery who would like the patient to be transferred to [facility] for an [id number]-[id number] bypass vs axillary-bifemoral bypass   Case reviewed with Dr. Bhatti with the [facility] internal medicine service at the [facility].  Patient has been accepted in transfer to [facility] for higher level of care and the need for surgery, however at this time we are still awaiting for a bed to be made available    Essential hypertension  Assessment & Plan  Blood pressure is well-controlled  Continue amiloride, and lisinopril    Type 2 diabetes mellitus with diabetic polyneuropathy ([facility])  Assessment & Plan  Lab Results   Component Value Date    HGBA1C 6.5 (H) 11/07/2023       Recent Labs     01/25/24  1610 01/25/24 2032 01/26/24  0712 01/26/24  1119   POCGLU 102 125 117 119         Blood Sugar Average: Last 72 hrs:  (P) 105.3069287391919533  Target blood sugar for the hospital is 140-180  Blood sugars are stable at this time  Continue Accu-Cheks before meals and at bedtime with sliding scale coverage  Oral hypoglycemics remain on hold  Diabetic neuropathy-continue Lyrica      COPD, severity to be determined ([facility])  Assessment & Plan  No exacerbation  Continue home medications  Patient is to have a PFT scheduled as an outpatient   Of note, patient continues to smoke-cessation counseling provided, nicotine patch is in place    Anxiety and depression  Assessment & Plan  Continue home Wellbutrin daily and clonazepam in the evening    GERD without esophagitis  Assessment & Plan  Continue PPI    Bladder neoplasm  Assessment & Plan  Urology follow-up  as an outpatient    Lactic acidosis  Assessment & Plan  IV fluids and monitor  Does not meet sepsis at this time  Was 3.3 on admission, repeat 1.7               VTE Pharmacologic Prophylaxis: VTE Score: 7 High Risk (Score >/= 5) - Pharmacological DVT Prophylaxis Ordered: enoxaparin (Lovenox). Sequential Compression Devices Ordered.    Mobility:   Basic Mobility Inpatient Raw Score: 19  JH-HLM Goal: 6: Walk 10 steps or more  JH-HLM Achieved: 4: Move to chair/commode  HLM Goal achieved. Continue to encourage appropriate mobility.    Patient Centered Rounds: I performed bedside rounds with nursing staff today.   Discussions with Specialists or Other Care Team Provider: Vascular surgery, ID, podiatry    Education and Discussions with Family / Patient: Updated  ( and daughter) at bedside.    Total Time Spent on Date of Encounter in care of patient: 45 mins. This time was spent on one or more of the following: performing physical exam; counseling and coordination of care; obtaining or reviewing history; documenting in the medical record; reviewing/ordering tests, medications or procedures; communicating with other healthcare professionals and discussing with patient's family/caregivers.    Current Length of Stay: 3 day(s)  Current Patient Status: Inpatient   Certification Statement: The patient will continue to require additional inpatient hospital stay due to the need for a bed to be made available at the [facility]  Discharge Plan: Anticipate discharge tomorrow to [facility]    Code Status: Level 3 - DNAR and DNI    Subjective:   Patient seen, sitting in bed, reports feeling okay while at rest    Objective:     Vitals:   Temp (24hrs), Av.7 °F (36.5 °C), Min:97.4 °F (36.3 °C), Max:98 °F (36.7 °C)    Temp:  [97.4 °F (36.3 °C)-98 °F (36.7 °C)] 98 °F (36.7 °C)  HR:  [77-79] 79  Resp:  [16-18] 16  BP: (135-142)/(63-64) 135/63  SpO2:  [94 %-95 %] 95 %  Body mass index is  27.53 kg/m².     Input and Output Summary (last 24 hours):     Intake/Output Summary (Last 24 hours) at 1/26/2024 1504  Last data filed at 1/26/2024 0717  Gross per 24 hour   Intake 2690 ml   Output 600 ml   Net 2090 ml       Physical Exam:   Physical Exam  Constitutional:       General: She is not in acute distress.     Appearance: Normal appearance. She is normal weight. She is not ill-appearing.   HENT:      Head: Normocephalic and atraumatic.      Nose: Nose normal.      Mouth/Throat:      Mouth: Mucous membranes are moist.   Eyes:      Extraocular Movements: Extraocular movements intact.      Pupils: Pupils are equal, round, and reactive to light.   Cardiovascular:      Rate and Rhythm: Normal rate and regular rhythm.      Pulses: Normal pulses.      Heart sounds: Normal heart sounds. No murmur heard.     No friction rub. No gallop.   Pulmonary:      Effort: Pulmonary effort is normal. No respiratory distress.      Breath sounds: Normal breath sounds. No wheezing, rhonchi or rales.   Abdominal:      General: There is no distension.      Palpations: Abdomen is soft. There is no mass.      Tenderness: There is no abdominal tenderness.      Hernia: No hernia is present.   Musculoskeletal:         General: No swelling or tenderness. Normal range of motion.      Cervical back: Normal range of motion and neck supple. No rigidity.      Right lower leg: No edema.      Left lower leg: No edema.      Comments: Right foot dressing is in place, dry, and intact   Skin:     General: Skin is warm.      Capillary Refill: Capillary refill takes less than 2 seconds.      Findings: No erythema or rash.   Neurological:      General: No focal deficit present.      Mental Status: She is alert and oriented to person, place, and time. Mental status is at baseline.      Cranial Nerves: No cranial nerve deficit.      Motor: No weakness.   Psychiatric:         Mood and Affect: Mood normal.         Behavior: Behavior normal.           Additional Data:     Labs:  Results from last 7 days   Lab Units 01/26/24  0443   WBC Thousand/uL 7.38   HEMOGLOBIN g/dL 12.1   HEMATOCRIT % 36.7   PLATELETS Thousands/uL 322   NEUTROS PCT % 66   LYMPHS PCT % 21   MONOS PCT % 7   EOS PCT % 5     Results from last 7 days   Lab Units 01/26/24  0443 01/24/24  0505 01/23/24  1940   SODIUM mmol/L 137   < > 135   POTASSIUM mmol/L 4.1   < > 4.5   CHLORIDE mmol/L 105   < > 99   CO2 mmol/L 27   < > 25   BUN mg/dL 11   < > 12   CREATININE mg/dL 0.48*   < > 0.54*   ANION GAP mmol/L 5   < > 11   CALCIUM mg/dL 9.1   < > 10.3*   ALBUMIN g/dL  --   --  4.2   TOTAL BILIRUBIN mg/dL  --   --  0.21   ALK PHOS U/L  --   --  64   ALT U/L  --   --  7   AST U/L  --   --  10*   GLUCOSE RANDOM mg/dL 115   < > 120    < > = values in this interval not displayed.     Results from last 7 days   Lab Units 01/23/24  1940   INR  1.03     Results from last 7 days   Lab Units 01/26/24  1119 01/26/24  0712 01/25/24  2032 01/25/24  1610 01/25/24  1123 01/25/24  0711 01/24/24  2224 01/24/24  1556 01/24/24  1058 01/24/24  0707 01/23/24  2215   POC GLUCOSE mg/dl 119 117 125 102 133 94 98 73 121 91 87         Results from last 7 days   Lab Units 01/24/24  0505 01/23/24  2223 01/23/24 1940   LACTIC ACID mmol/L  --  1.7 3.3*   PROCALCITONIN ng/ml <0.05  --  <0.05       Lines/Drains:  Invasive Devices       Peripheral Intravenous Line  Duration             Peripheral IV 01/23/24 Right;Ventral (anterior) Forearm 2 days    Peripheral IV 01/25/24 Distal;Dorsal (posterior);Right Forearm 1 day              Drain  Duration             Ureteral Internal Stent Left ureter 6 Fr. 67 days                          Imaging: No pertinent imaging reviewed.    Recent Cultures (last 7 days):   Results from last 7 days   Lab Units 01/23/24 1952 01/23/24 1945 01/23/24 1940   BLOOD CULTURE  No Growth at 48 hrs.  --  No Growth at 48 hrs.   GRAM STAIN RESULT   --  No Polys or Bacteria seen  --    WOUND CULTURE   --  1+  Growth of  --        Last 24 Hours Medication List:   Current Facility-Administered Medications   Medication Dose Route Frequency Provider Last Rate    acetaminophen  650 mg Oral Q4H PRN Pam Ambrocio PA-C      albuterol  2 puff Inhalation Q6H PRN Pam Ambrocio PA-C      AMILoride  5 mg Oral Daily Pam Ambrocio PA-C      aspirin  81 mg Oral Daily Pam Ambrocio PA-C      atorvastatin  10 mg Oral Daily With Dinner Pam Ambrocio PA-C      buPROPion  300 mg Oral Daily Pam Ambrocio PA-C      clonazePAM  1 mg Oral QPM Pam Ambrocio PA-C      enoxaparin  40 mg Subcutaneous Daily Pam Ambrocio PA-C      fish oil  1,000 mg Oral Daily Pam Ambrocio PA-C      fluticasone-vilanterol  1 puff Inhalation Daily Pam Ambrocio PA-C      insulin lispro  1-5 Units Subcutaneous TID AC Pam Ambrocio PA-C      insulin lispro  1-5 Units Subcutaneous HS Pam Ambrocio PA-C      insulin lispro  5 Units Subcutaneous TID With Meals Pam Ambrocio PA-C      lisinopril  5 mg Oral Daily Pam Ambrocio PA-C      nicotine  1 patch Transdermal Daily Pam Ambrocio PA-C      ondansetron  4 mg Intravenous Q6H PRN Pam Ambrocio PA-C      oxybutynin  5 mg Oral Daily Pam Ambrocio PA-C      pantoprazole  40 mg Oral Early Morning Pam Ambrocio PA-C      pregabalin  200 mg Oral TID Pam Ambrocio PA-C      sodium chloride  100 mL/hr Intravenous Continuous Pam Ambrocio PA-C Stopped (01/26/24 1501)        Today, Patient Was Seen By: Claudine Pretty MD    **Please Note: This note may have been constructed using a voice recognition system.**

## 2024-01-26 NOTE — DISCHARGE SUMMARY
[facility]  Discharge- Lona Cedeno 1945, 78 y.o. female MRN: [id number]  Unit/Bed#: -01 Encounter: [id number]  Primary Care Provider: Vivek Preston DO   Date and time admitted to hospital: 1/23/2024  7:28 PM    * Diabetic foot ulcer with osteomyelitis (HCC)  Assessment & Plan  Patient with right second toe cellulitis  X-ray right foot-Extensive erosive changes and cortical irregularity involving the second distal phalanx concerning for osteomyelitis. Soft tissue swelling of the second toe is noted.   MRI right foot-Second toe distal skin ulceration with cellulitis as well as resorptive changes at the second distal tuft. The residual second distal phalanx exhibits increased T2 signal with T1 replacement signal concerning for osteomyelitis. There is also marrow edema. Second proximal phalanx without T1 replacement signal at this time; therefore, low suspicion for osteomyelitis.   Appreciate ID input, antibiotics have been discontinued  Appreciate podiatry input, no acute phase inpatient surgical intervention required until the patient has been cleared from a vascular surgical standpoint, after which there will be a plan to remove the second toe of the right  See the remaining details as outlined below    Addendum at 5:45 PM-notified by the patient access center that the patient has been assigned a bed at the [facility], will transfer there for higher level of care since she will need a vascular surgical intervention as outlined below.  Additional management will be as per the the physicians at [facility].  An EMTALA form was completed.    Ankle ulcer, right, with fat layer exposed (HCC)  Assessment & Plan  X-ray right foot-Large soft tissue wound superficial to the Achilles tendon.   MRI right ankle - 1. Medial ankle skin ulceration with mild reactive marrow edema at the medial malleolus without T1 replacement signal at this time. Findings are low  suspicion for osteomyelitis. 2. Posterior ankle skin ulceration with thinning of the adjacent Achilles tendon and mild critical zone tendinosis. There is no evidence of tear with an intact insertion. 4. Chronic healed posterior and medial malleoli are fractures with screw fixation of a distal fibular fracture, the latter incompletely evaluated due to susceptibility artifacts.   Continue wound care  Continued management as per podiatry, patient will need a podiatry evaluation upon arrival into the [facility]      PAD (peripheral artery disease) ([facility])  Assessment & Plan  Recent arterial Dopplers with diffuse disease throughout the femoral-popliteal arteries without significant focal stenosis  Appreciate vascular surgical consultation  Case reviewed with vascular surgery who would like the patient to be transferred to [facility] for an [id number]-[id number] bypass vs axillary-bifemoral bypass   Case reviewed with Dr. Bhatti with the [facility] internal medicine service at the [facility].  Patient has been accepted in transfer to [facility] for higher level of care and the need for surgery, however at this time we are still awaiting for a bed to be made available    Essential hypertension  Assessment & Plan  Blood pressure is well-controlled  Continue amiloride, and lisinopril    Type 2 diabetes mellitus with diabetic polyneuropathy ([facility])  Assessment & Plan  Lab Results   Component Value Date    HGBA1C 6.5 (H) 11/07/2023       Recent Labs     01/25/24  2032 01/26/24  0712 01/26/24  1119 01/26/24  1609   POCGLU 125 117 119 154*         Blood Sugar Average: Last 72 hrs:  (P) 109.5  Target blood sugar for the hospital is 140-180  Blood sugars are stable at this time  Continue Accu-Cheks before meals and at bedtime with sliding scale coverage  Oral hypoglycemics remain on hold  Diabetic neuropathy-continue Lyrica      COPD, severity to be determined ([facility])  Assessment & Plan  No  exacerbation  Continue home medications  Patient is to have a PFT scheduled as an outpatient   Of note, patient continues to smoke-cessation counseling provided, nicotine patch is in place    Anxiety and depression  Assessment & Plan  Continue home Wellbutrin daily and clonazepam in the evening    GERD without esophagitis  Assessment & Plan  Continue PPI    Bladder neoplasm  Assessment & Plan  Urology follow-up as an outpatient    Lactic acidosis  Assessment & Plan  IV fluids and monitor  Does not meet sepsis at this time  Was 3.3 on admission, repeat 1.7    Preoperative cardiovascular examination  Assessment & Plan  Completed by cardiology, cleared for surgical intervention        Medical Problems       Resolved Problems  Date Reviewed: 1/23/2024   None       Discharging Physician / Practitioner: Claudine Pretty MD  PCP: Vivek Preston DO  Admission Date:   Admission Orders (From admission, onward)       Ordered        01/23/24 2028  INPATIENT ADMISSION  Once                          Discharge Date: 01/26/24    Consultations During Hospital Stay:  Cardiology  Vascular surgery  Podiatry  Infectious disease    Procedures Performed:   None    Significant Findings / Test Results:   CTA abdomen runoff with and without contrast - Extensive calcific atherosclerotic disease in the abdominal aorta resulting in obliteration of the lumen. Patency is difficult to assess, but the hypertrophied arch of Meno suggest a significant stenosis or occlusion. Bilateral iliofemoral segment disease. Three-vessel bilateral lower extremity runoff with mild atherosclerotic disease. Diverticulosis. Duodenal diverticulum containing stones.   MRI right ankle-1. Medial ankle skin ulceration with mild reactive marrow edema at the medial malleolus without T1 replacement signal at this time. Findings are low suspicion for osteomyelitis.   2. Posterior ankle skin ulceration with thinning of the adjacent Achilles tendon and mild critical  zone tendinosis. There is no evidence of tear with an intact insertion.   4. Chronic healed posterior and medial malleoli are fractures with screw fixation of a distal fibular fracture, the latter incompletely evaluated due to susceptibility artifacts.   MRI right foot-Second toe distal skin ulceration with cellulitis as well as resorptive changes at the second distal tuft. The residual second distal phalanx exhibits increased T2 signal with T1 replacement signal concerning for osteomyelitis. There is also marrow edema    second proximal phalanx without T1 replacement signal at this time; therefore, low suspicion for osteomyelitis.   X-ray right foot-Extensive erosive changes and cortical irregularity involving the second distal phalanx concerning for osteomyelitis. Soft tissue swelling of the second toe is noted. Large soft tissue wound superficial to the Achilles tendon.           Incidental Findings:   None    Test Results Pending at Discharge (will require follow up):   None     Outpatient Tests Requested:  None    Complications: None    Reason for Admission: Right foot wound/ankle wound, osteomyelitis of the second toe    Hospital Course:   Lona Cedeno is a 78 y.o. female patient who originally presented to the hospital on 1/23/2024 due to a diabetic ulcer toe, and nonhealing wound of the right ankle.  Please refer to the initial history and physical examination completed by Pam glynn PA-C for the initial presenting features and complaints.  In brief, the patient is a 78-year-old female, with a known history of diabetes that came in with a nonhealing wound of her right ankle, and second toe on the right foot.  Initial considerations included were osteomyelitis.  She received 3 days worth of IV Zosyn.  She was seen in conjunction with ID, antibiotics were discontinued since this was felt to be more related to severe peripheral vascular disease.  She was seen in conjunction with podiatry, and vascular  surgery.  Podiatry felt that the patient will ultimately benefit from a right second toe amputation, but wanted the patient to be cleared from a vascular standpoint.  Vascular surgery evaluated the patient completed the testing as resulted above, and felt that the patient needed to be transferred to the [facility] for an aorto by iliac bypass surgical intervention.  Patient was accepted in transfer by the [facility] internal medicine service.  Patient will be transferred to [place], her family and the patient were in agreement with this plan.  Please refer to the assessment/plan portion of this discharge summary as outlined above for additional details.        Please see above list of diagnoses and related plan for additional information.     Condition at Discharge: fair    Discharge Day Visit / Exam:   Please refer to my progress note from earlier today for the discharge Day visit/exam details    Discussion with Family: Updated  ( and daughter) at bedside.    Discharge instructions/Information to patient and family:   See after visit summary for information provided to patient and family.      Provisions for Follow-Up Care:  See after visit summary for information related to follow-up care and any pertinent home health orders.      Mobility at time of Discharge:   Basic Mobility Inpatient Raw Score: 19  JH-HLM Goal: 6: Walk 10 steps or more  JH-HLM Achieved: 4: Move to chair/commode  HLM Goal achieved. Continue to encourage appropriate mobility.     Disposition:   Acute Care Hospital Transfer to [facility]    Planned Readmission: To [facility]     Discharge Statement:  I spent 55 minutes discharging the patient. This time was spent on the day of discharge. I had direct contact with the patient on the day of discharge. Greater than 50% of the total time was spent examining patient, answering all patient questions, arranging and discussing plan of care with patient as  well as directly providing post-discharge instructions.  Additional time then spent on discharge activities.    Discharge Medications:  See after visit summary for reconciled discharge medications provided to patient and/or family.      **Please Note: This note may have been constructed using a voice recognition system**

## 2024-01-26 NOTE — ASSESSMENT & PLAN NOTE
X-ray right foot-Large soft tissue wound superficial to the Achilles tendon.   MRI right ankle - 1. Medial ankle skin ulceration with mild reactive marrow edema at the medial malleolus without T1 replacement signal at this time. Findings are low suspicion for osteomyelitis. 2. Posterior ankle skin ulceration with thinning of the adjacent Achilles tendon and mild critical zone tendinosis. There is no evidence of tear with an intact insertion. 4. Chronic healed posterior and medial malleoli are fractures with screw fixation of a distal fibular fracture, the latter incompletely evaluated due to susceptibility artifacts.   Continue wound care  Continued management as per podiatry, patient will need a podiatry evaluation upon arrival into the [facility]

## 2024-01-26 NOTE — PROGRESS NOTES
"Progress Note - Vascular Surgery   Lona Cedeno 78 y.o. 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 R foot wounds with dressings in place     Plan:  Had a long discussion with patient and family regarding the nature of the patient's disease. Discussed the options of surgical intervention vs no intervention. She understands that she is at a high risk for limb loss if she opts for no intervention. Patient would like to proceed with surgical intervention. A brief overview of an mzkao-iz-[id number] bypass vs axillary-bifemoral bypass was provided for the patient.   Discussed with Dr. Pretty who is facilitating transfer to [facility]   CTA with runoff not formally read, but discussed with Dr. Chavez who evaluated the images herself and is concerned for a near aortic occlusion   Continue ASA, statin   Analgesia and antiemetics prn   Appreciate podiatry, cardiology and infectious disease recommendations   Rest of medical management per SLIM  Discussed with vascular surgeon, Dr. Ana Rosa Chavez     Subjective/Objective   Subjective: Had a long discussion with patient and family regarding the nature of the patient's disease. Discussed the options of surgical intervention vs no intervention. Patient understands that she is at a high risk for limb loss if she opts for no intervention. She is nervous about a procedure, but is willing to proceed. All questions answered to the best of my ability.     Objective:  Blood pressure 135/63, pulse 79, temperature 98 °F (36.7 °C), resp. rate 16, height 5' 1\" (1.549 m), weight 66.1 kg (145 lb 11.6 oz), SpO2 95%.,Body mass index is 27.53 " kg/m².      Intake/Output Summary (Last 24 hours) at 1/26/2024 1101  Last data filed at 1/26/2024 0717  Gross per 24 hour   Intake 2810 ml   Output 600 ml   Net 2210 ml       Invasive Devices       Peripheral Intravenous Line  Duration             Peripheral IV 01/23/24 Right;Ventral (anterior) Forearm 2 days    Peripheral IV 01/25/24 Distal;Dorsal (posterior);Right Forearm <1 day              Drain  Duration             Ureteral Internal Stent Left ureter 6 Fr. 67 days                    Physical Exam  Vitals reviewed.   Constitutional:       General: She is not in acute distress.     Appearance: She is obese. She is not ill-appearing.   HENT:      Head: Normocephalic and atraumatic.   Cardiovascular:      Rate and Rhythm: Normal rate and regular rhythm.   Pulmonary:      Effort: Pulmonary effort is normal. No respiratory distress.   Abdominal:      General: Abdomen is flat. There is no distension.      Palpations: Abdomen is soft.      Tenderness: There is no abdominal tenderness. There is no guarding.   Musculoskeletal:      Right lower leg: No edema.      Left lower leg: No edema.      Comments: R foot wounds with dressings in place. Nonpalpable R dp and pt pulses. Nonpalpable R femoral pulse   Skin:     General: Skin is warm and dry.   Neurological:      General: No focal deficit present.      Mental Status: She is alert. Mental status is at baseline.   Psychiatric:         Mood and Affect: Mood normal.         Behavior: Behavior normal.          Lab, Imaging and other studies:I have personally reviewed pertinent lab results.  , CBC:   Lab Results   Component Value Date    WBC 7.38 01/26/2024    HGB 12.1 01/26/2024    HCT 36.7 01/26/2024    MCV 97 01/26/2024     01/26/2024    RBC 3.79 (L) 01/26/2024    MCH 31.9 01/26/2024    MCHC 33.0 01/26/2024    RDW 12.6 01/26/2024    MPV 9.0 01/26/2024    NRBC 0 01/26/2024   , CMP:   Lab Results   Component Value Date    SODIUM 137 01/26/2024    K 4.1 01/26/2024      01/26/2024    CO2 27 01/26/2024    BUN 11 01/26/2024    CREATININE 0.48 (L) 01/26/2024    CALCIUM 9.1 01/26/2024    EGFR 94 01/26/2024     VTE Pharmacologic Prophylaxis: Enoxaparin (Lovenox)  VTE Mechanical Prophylaxis: sequential compression device    Shaneka Yancey PA-C

## 2024-01-26 NOTE — ASSESSMENT & PLAN NOTE
X-ray right foot-Large soft tissue wound superficial to the Achilles tendon.   MRI right ankle - 1. Medial ankle skin ulceration with mild reactive marrow edema at the medial malleolus without T1 replacement signal at this time. Findings are low suspicion for osteomyelitis. 2. Posterior ankle skin ulceration with thinning of the adjacent Achilles tendon and mild critical zone tendinosis. There is no evidence of tear with an intact insertion. 4. Chronic healed posterior and medial malleoli are fractures with screw fixation of a distal fibular fracture, the latter incompletely evaluated due to susceptibility artifacts.   Continue wound care  Continued management as per podiatry, patient will need a podiatry evaluation upon arrival into the [facility]

## 2024-01-26 NOTE — CASE MANAGEMENT
Case Management Discharge Planning Note    Patient name Lona Cedeno  Location /-01 MRN [id number]  : 1945 Date 2024       Current Admission Date: 2024  Current Admission Diagnosis:Diabetic foot ulcer with osteomyelitis (HCC)   Patient Active Problem List    Diagnosis Date Noted    Preoperative cardiovascular examination 2024    Diabetic foot ulcer with osteomyelitis (HCC) 2024    Ankle ulcer, right, with fat layer exposed (HCC) 2023    Age-related osteoporosis without current pathological fracture 2023    Abnormal CT of the chest 10/17/2023    COPD, severity to be determined ([facility]) 10/17/2023    Tobacco use disorder 10/17/2023    PAD (peripheral artery disease) ([facility]) 10/10/2023    Bladder neoplasm 2023    Left renal mass 2023    Lactic acidosis 2023    Hypomagnesemia 2023    Degenerative lumbar disc 2023    Urinary incontinence 2023    GERD without esophagitis 10/16/2019    Medicare annual wellness visit, subsequent 2019    Essential hypertension 02/15/2019    Anxiety and depression 02/15/2019    Type 2 diabetes mellitus with diabetic polyneuropathy ([facility]) 2019      LOS (days): 3  Geometric Mean LOS (GMLOS) (days): 3  Days to GMLOS:0.1     OBJECTIVE:  Risk of Unplanned Readmission Score: 13.01         Current admission status: Inpatient   Preferred Pharmacy:   [id number] - [address] [address]  Phone: [phone] Fax: [phone]    [place], [place] - [address]  [address] [facility] [place] [address]  Phone: [phone] Fax: [phone]    Primary Care Provider: Vivek Preston DO    Primary Insurance: [facility] REP  Secondary Insurance:     DISCHARGE DETAILS:    Discharge planning discussed with:: patient  & spouse was called a 17:41 pm GREER & Ariela was called at 17:46pm  Freedom of Choice: Yes  Comments - Freedom  of Choice: pt needs to be transferred for higher level of care- pt needs vascular surgery- pt & family are in agreement with the transfer  CM contacted family/caregiver?: Yes  Were Treatment Team discharge recommendations reviewed with patient/caregiver?: Yes  Did patient/caregiver verbalize understanding of patient care needs?: Yes  Were patient/caregiver advised of the risks associated with not following Treatment Team discharge recommendations?: Yes    Contacts  Patient Contacts: Carmelo Cedeno  & Ariela Feng  Relationship to Patient:: Family (spouse & daughter)  Contact Method: Phone  Phone Number: [phone] LM spouse    daughter   [phone]  Reason/Outcome: Discharge Planning    Requested Home Health Care         Is the patient interested in C at discharge?: No         Other Referral/Resources/Interventions Provided:  Interventions: Acute Hospital Transfer  Referral Comments: pt transfered to [facility]    Would you like to participate in our [facility] service program?  : No - Declined    Treatment Team Recommendation: Acute Hospital Transfer ([facility])  Discharge Destination Plan:: Acute Hospital Transfer ([facility])                                      Family notified:: family was called

## 2024-01-26 NOTE — ASSESSMENT & PLAN NOTE
Patient with right second toe cellulitis  X-ray right foot-Extensive erosive changes and cortical irregularity involving the second distal phalanx concerning for osteomyelitis. Soft tissue swelling of the second toe is noted.   MRI right foot-Second toe distal skin ulceration with cellulitis as well as resorptive changes at the second distal tuft. The residual second distal phalanx exhibits increased T2 signal with T1 replacement signal concerning for osteomyelitis. There is also marrow edema. Second proximal phalanx without T1 replacement signal at this time; therefore, low suspicion for osteomyelitis.   Appreciate ID input, antibiotics have been discontinued  Appreciate podiatry input, no acute phase inpatient surgical intervention required until the patient has been cleared from a vascular surgical standpoint, after which there will be a plan to remove the second toe of the right  See the remaining details as outlined below

## 2024-01-26 NOTE — EMTALA/ACUTE CARE TRANSFER
[facility]  [address] [place] [address]  No information on file.      ACUTE CARE TRANSFER CONSENT    NAME Lona Cedeno                                         1945                              MRN [id number]    I have been informed of my rights regarding examination, treatment, and transfer   by Dr. Claudine Pretty MD    Benefits:  Vascular surgical intervention/surgery    Risks:  Transportation accident      Consent for Transfer:  I acknowledge that my medical condition has been evaluated and explained to me by the treating physician or other qualified medical person and/or my attending physician, who has recommended that I be transferred to the service of    at  . The above potential benefits of such transfer, the potential risks associated with such transfer, and the probable risks of not being transferred have been explained to me, and I fully understand them.  The doctor has explained that, in my case, the benefits of transfer outweigh the risks.  I agree to be transferred.    I authorize the performance of emergency medical procedures and treatments upon me in both transit and upon arrival at the receiving facility.  Additionally, I authorize the release of any and all medical records to the receiving facility and request they be transported with me, if possible.  I understand that the safest mode of transportation during a medical emergency is an ambulance and that the Hospital advocates the use of this mode of transport. Risks of traveling to the receiving facility by car, including absence of medical control, life sustaining equipment, such as oxygen, and medical personnel has been explained to me and I fully understand them.    (IRON CORRECT BOX BELOW)  [  ]  I consent to the stated transfer and to be transported by ambulance/helicopter.  [  ]  I consent to the stated transfer, but refuse transportation by ambulance and accept full  responsibility for my transportation by car.  I understand the risks of non-ambulance transfers and I exonerate the Hospital and its staff from any deterioration in my condition that results from this refusal.    X___________________________________________    DATE  24  TIME________  Signature of patient or legally responsible individual signing on patient behalf           RELATIONSHIP TO PATIENT_________________________          Provider Certification    NAME Lona Cedeno                                         1945                              MRN [id number]    A medical screening exam was performed on the above named patient.  Based on the examination:    Condition Necessitating Transfer severe bilateral lower extremity peripheral vascular disease    Patient Condition:  Stable    Reason for Transfer:  Higher level of care, need for vascular surgical intervention    Transfer Requirements: Facility     Space available and qualified personnel available for treatment as acknowledged by  the patient access center  Agreed to accept transfer and to provide appropriate medical treatment as acknowledged by Dr. Wood          Appropriate medical records of the examination and treatment of the patient are provided at the time of transfer   STAFF INITIAL WHEN COMPLETED _______  Transfer will be performed by qualified personnel from    and appropriate transfer equipment as required, including the use of necessary and appropriate life support measures.    Provider Certification: I have examined the patient and explained the following risks and benefits of being transferred/refusing transfer to the patient/family:         Based on these reasonable risks and benefits to the patient and/or the unborn child(juan), and based upon the information available at the time of the patient’s examination, I certify that the medical benefits reasonably to be expected from the provision of appropriate medical treatments at  another medical facility outweigh the increasing risks, if any, to the individual’s medical condition, and in the case of labor to the unborn child, from effecting the transfer.    X____________________________________________ DATE 01/26/24        TIME_______      ORIGINAL - SEND TO MEDICAL RECORDS   COPY - SEND WITH PATIENT DURING TRANSFER

## 2024-01-26 NOTE — ASSESSMENT & PLAN NOTE
Lab Results   Component Value Date    HGBA1C 6.5 (H) 11/07/2023       Recent Labs     01/25/24  1610 01/25/24 2032 01/26/24  0712 01/26/24  1119   POCGLU 102 125 117 119         Blood Sugar Average: Last 72 hrs:  (P) [id number]  Target blood sugar for the hospital is 140-180  Blood sugars are stable at this time  Continue Accu-Cheks before meals and at bedtime with sliding scale coverage  Oral hypoglycemics remain on hold  Diabetic neuropathy-continue Lyrica

## 2024-01-26 NOTE — ASSESSMENT & PLAN NOTE
Patient with right second toe cellulitis  X-ray right foot-Extensive erosive changes and cortical irregularity involving the second distal phalanx concerning for osteomyelitis. Soft tissue swelling of the second toe is noted.   MRI right foot-Second toe distal skin ulceration with cellulitis as well as resorptive changes at the second distal tuft. The residual second distal phalanx exhibits increased T2 signal with T1 replacement signal concerning for osteomyelitis. There is also marrow edema. Second proximal phalanx without T1 replacement signal at this time; therefore, low suspicion for osteomyelitis.   Appreciate ID input, antibiotics have been discontinued  Appreciate podiatry input, no acute phase inpatient surgical intervention required until the patient has been cleared from a vascular surgical standpoint, after which there will be a plan to remove the second toe of the right  See the remaining details as outlined below    Addendum at 5:45 PM-notified by the patient access center that the patient has been assigned a bed at the [facility], will transfer there for higher level of care since she will need a vascular surgical intervention as outlined below.  Additional management will be as per the the physicians at [facility].  An EMTALA form was completed.

## 2024-01-26 NOTE — TRANSPORTATION MEDICAL NECESSITY
"Section I - General Information    Name of Patient: Lona Cedeno                 : 1945    Medicare #: [id number]  Transport Date: 24 (PCS is valid for round trips on this date and for all repetitive trips in the 60-day range as noted below.)  Origin: [facility]                                                         Destination: [address] [address]  Is the pt's stay covered under Medicare Part A (PPS/DRG)   [x]     Closest appropriate facility? If no, why is transport to more distant facility required? Yes  If hospice pt, is this transport related to pt's terminal illness? NA       Section II - Medical Necessity Questionnaire  Ambulance transportation is medically necessary only if other means of transport are contraindicated or would be potentially harmful to the patient. To meet this requirement, the patient must either be \"bed confined\" or suffer from a condition such that transport by means other than ambulance is contraindicated by the patient's condition. The following questions must be answered by the medical professional signing below for this form to be valid:    1)  Describe the MEDICAL CONDITION (physical and/or mental) of this patient AT THE TIME OF AMBULANCE TRANSPORT that requires the patient to be transported in an ambulance and why transport by other means is contraindicated by the patient's condition: Diabetic foot  with Osteo- pt needs higher level of care- pt needs vascular surgery    2) Is the patient \"bed confined\" as defined below?     No  To be \"be confined\" the patient must satisfy all three of the following conditions: (1) unable to get up from bed without Assistance; AND (2) unable to ambulate; AND (3) unable to sit in a chair or wheelchair.    3) Can this patient safely be transported by car or wheelchair van (i.e., seated during transport without a medical attendant or monitoring)?   No    4) In " ED HPI GENERAL MEDICAL PROBLEM





- General


Chief Complaint: Laceration


Stated Complaint: HIT COFFEE TABLE CUT FOREHEAD


Time Seen by Provider: 02/12/19 19:50


Source of Information: Reports: Family


History Limitations: Reports: No Limitations





- History of Present Illness


INITIAL COMMENTS - FREE TEXT/NARRATIVE: 


2 year 5-month-old child fell and hit his head on the coffee table sustaining a 

laceration to his forehead. No loss of consciousness, no nausea or vomiting. He 

is otherwise healthy, no other injury.


Onset: Sudden


Duration: Hour(s): (Within the last hour)


Location: Reports: Head


Associated Symptoms: Reports: No Other Symptoms





- Related Data


 Allergies











Allergy/AdvReac Type Severity Reaction Status Date / Time


 


No Known Allergies Allergy   Verified 08/29/17 19:09











Home Meds: 


 Home Meds





NK [No Known Home Meds]  09/12/17 [History]











Past Medical History





- Past Health History


Medical/Surgical History: Denies Medical/Surgical History





Social & Family History





- Tobacco Use


Smoking Status *Q: Never Smoker





- Caffeine Use


Caffeine Use: Reports: None





- Recreational Drug Use


Recreational Drug Use: No





ED ROS GENERAL





- Review of Systems


Review Of Systems: See Below


Constitutional: Denies: Fever


Respiratory: Denies: Shortness of Breath


GI/Abdominal: Denies: Nausea, Vomiting





ED EXAM, SKIN/RASH


Exam: See Below


Exam Limited By: No Limitations


General Appearance: Alert, No Apparent Distress


Eye Exam: Bilateral Eye: Normal Inspection


Nose: Normal Inspection


Head: Other (Child has a 3 cm longitudinal laceration in the center high 

forehead.)


Respiratory/Chest: No Respiratory Distress





Course





- Vital Signs


Last Recorded V/S: 


 Last Vital Signs











Temp  98 F   02/12/19 19:49


 


Pulse  137   02/12/19 19:49


 


Resp  28   02/12/19 19:49


 


BP      


 


Pulse Ox  98   02/12/19 19:49














- Orders/Labs/Meds


Meds: 


Medications














Discontinued Medications














Generic Name Dose Route Start Last Admin





  Trade Name Loc  PRN Reason Stop Dose Admin


 


Bacitracin  1 dose  02/12/19 19:58  02/12/19 20:01





  Bacitracin Oint 1 Gm  TOP  02/12/19 19:59  1 dose





  ONETIME ONE   Administration





     





     





     





     


 


Lidocaine HCl  5 ml  02/12/19 19:58  02/12/19 20:01





  Xylocaine-Mpf 1%  INJECT  02/12/19 19:59  5 ml





  ONETIME ONE   Administration





     





     





     





     














- Re-Assessments/Exams


Free Text/Narrative Re-Assessment/Exam: 





02/12/19 20:00


1% lidocaine was used to infiltrate the laceration.


02/12/19 20:18


4 6-0 Ethilon sutures were used to close the wound. Topical bacitracin and a 

Band-Aid was applied, sutures can be removed Monday morning in 5-1/2 days.





Departure





- Departure


Time of Disposition: 20:32


Disposition: Home, Self-Care 01


Condition: Good


Clinical Impression: 


Laceration of forehead without complication


Qualifiers:


 Encounter type: initial encounter Qualified Code(s): S01.81XA - Laceration 

without foreign body of other part of head, initial encounter








- Discharge Information


Instructions:  Laceration Care, Pediatric


Referrals: 


PCP,None [Primary Care Provider] - 


Forms:  ED Department Discharge


Care Plan Goals: 


Keep wound covered and clean while healing and sutures can be removed on Monday 

morning next week, the 18th. Return sooner if concerns of infection or not 

healing satisfactorily. addition to completing questions 1-3 above, please check any of the following conditions that apply*:   *Note: supporting documentation for any boxes checked must be maintained in the patient's medical records.  If hosp-hosp transfer, describe services needed at 2nd facility not available at 1st facility?   Unable to tolerate seated position for time needed to transport       Section III - Signature of Physician or Healthcare Professional  I certify that the above information is true and correct based on my evaluation of this patient, and represent that the patient requires transport by ambulance and that other forms of transport are contraindicated. I understand that this information will be used by the Centers for Medicare and Medicaid Services (CMS) to support the determination of medical necessity for ambulance services, and I represent that I have personal knowledge of the patient's condition at time of transport.    []  If this box is checked, I also certify that the patient is physically or mentally incapable of signing the ambulance service's claim and that the institution with which I am affiliated has furnished care, services, or assistance to the patient.    My signature below is made on behalf of the patient pursuant to 42 CFR §424.36(b)(4). In accordance with 42 CFR §424.37, the specific reason(s) that the patient is physically or mentally incapable of signing the claim form is as follows:       Signature of Physician* or Healthcare Professional______________________________________________________________  Signature Date 01/26/24 (For scheduled repetitive transports, this form is not valid for transports performed more than 60 days after this date)    Printed Name & Credentials of Physician or Healthcare Professional (MD, DO, RN, etc.)______Zuly Sanchez RN__________________________  *Form must be signed by patient's attending physician for scheduled, repetitive transports. For non-repetitive,  unscheduled ambulance transports, if unable to obtain the signature of the attending physician, any of the following may sign (choose appropriate option below)  [] Physician Assistant []  Clinical Nurse Specialist [x]  Registered Nurse  []  Nurse Practitioner  [x] Discharge Planner

## 2024-01-26 NOTE — PLAN OF CARE
Problem: Prexisting or High Potential for Compromised Skin Integrity  Goal: Skin integrity is maintained or improved  Description: INTERVENTIONS:  - Identify patients at risk for skin breakdown  - Assess and monitor skin integrity  - Assess and monitor nutrition and hydration status  - Monitor labs   - Assess for incontinence   - Turn and reposition patient  - Assist with mobility/ambulation  - Relieve pressure over bony prominences  - Avoid friction and shearing  - Provide appropriate hygiene as needed including keeping skin clean and dry  - Evaluate need for skin moisturizer/barrier cream  - Collaborate with interdisciplinary team   - Patient/family teaching  - Consider wound care consult   Outcome: Progressing     Problem: PAIN - ADULT  Goal: Verbalizes/displays adequate comfort level or baseline comfort level  Description: Interventions:  - Encourage patient to monitor pain and request assistance  - Assess pain using appropriate pain scale  - Administer analgesics based on type and severity of pain and evaluate response  - Implement non-pharmacological measures as appropriate and evaluate response  - Consider cultural and social influences on pain and pain management  - Notify physician/advanced practitioner if interventions unsuccessful or patient reports new pain  Outcome: Progressing     Problem: INFECTION - ADULT  Goal: Absence or prevention of progression during hospitalization  Description: INTERVENTIONS:  - Assess and monitor for signs and symptoms of infection  - Monitor lab/diagnostic results  - Monitor all insertion sites, i.e. indwelling lines, tubes, and drains  - Monitor endotracheal if appropriate and nasal secretions for changes in amount and color  - [place] appropriate cooling/warming therapies per order  - Administer medications as ordered  - Instruct and encourage patient and family to use good hand hygiene technique  - Identify and instruct in appropriate isolation precautions for  identified infection/condition  Outcome: Progressing  Goal: Absence of fever/infection during neutropenic period  Description: INTERVENTIONS:  - Monitor WBC    Outcome: Progressing     Problem: SAFETY ADULT  Goal: Patient will remain free of falls  Description: INTERVENTIONS:  - Educate patient/family on patient safety including physical limitations  - Instruct patient to call for assistance with activity   - Consult OT/PT to assist with strengthening/mobility   - Keep Call bell within reach  - Keep bed low and locked with side rails adjusted as appropriate  - Keep care items and personal belongings within reach  - Initiate and maintain comfort rounds  - Make Fall Risk Sign visible to staff  - Offer Toileting every 4 Hours, in advance of need  - Initiate/Maintain bed alarm  - Obtain necessary fall risk management equipment: .  - Apply yellow socks and bracelet for high fall risk patients  - Consider moving patient to room near nurses station  Outcome: Progressing  Goal: Maintain or return to baseline ADL function  Description: INTERVENTIONS:  -  Assess patient's ability to carry out ADLs; assess patient's baseline for ADL function and identify physical deficits which impact ability to perform ADLs (bathing, care of mouth/teeth, toileting, grooming, dressing, etc.)  - Assess/evaluate cause of self-care deficits   - Assess range of motion  - Assess patient's mobility; develop plan if impaired  - Assess patient's need for assistive devices and provide as appropriate  - Encourage maximum independence but intervene and supervise when necessary  - Involve family in performance of ADLs  - Assess for home care needs following discharge   - Consider OT consult to assist with ADL evaluation and planning for discharge  - Provide patient education as appropriate  Outcome: Progressing  Goal: Maintains/Returns to pre admission functional level  Description: INTERVENTIONS:  - Perform AM-PAC 6 Click Basic Mobility/ Daily Activity  assessment daily.  - Set and communicate daily mobility goal to care team and patient/family/caregiver.   - Collaborate with rehabilitation services on mobility goals if consulted  - Perform Range of Motion 3 times a day.  - Reposition patient every 2 hours.  - Dangle patient 3 times a day  - Stand patient 3 times a day  - Ambulate patient 3 times a day  - Out of bed to chair 3 times a day   - Out of bed for meals 3 times a day  - Out of bed for toileting  - Record patient progress and toleration of activity level   Outcome: Progressing     Problem: DISCHARGE PLANNING  Goal: Discharge to home or other facility with appropriate resources  Description: INTERVENTIONS:  - Identify barriers to discharge w/patient and caregiver  - Arrange for needed discharge resources and transportation as appropriate  - Identify discharge learning needs (meds, wound care, etc.)  - Arrange for interpretive services to assist at discharge as needed  - Refer to Case Management Department for coordinating discharge planning if the patient needs post-hospital services based on physician/advanced practitioner order or complex needs related to functional status, cognitive ability, or social support system  Outcome: Progressing     Problem: Knowledge Deficit  Goal: Patient/family/caregiver demonstrates understanding of disease process, treatment plan, medications, and discharge instructions  Description: Complete learning assessment and assess knowledge base.  Interventions:  - Provide teaching at level of understanding  - Provide teaching via preferred learning methods  Outcome: Progressing     Problem: Nutrition/Hydration-ADULT  Goal: Nutrient/Hydration intake appropriate for improving, restoring or maintaining nutritional needs  Description: Monitor and assess patient's nutrition/hydration status for malnutrition. Collaborate with interdisciplinary team and initiate plan and interventions as ordered.  Monitor patient's weight and dietary  intake as ordered or per policy. Utilize nutrition screening tool and intervene as necessary. Determine patient's food preferences and provide high-protein, high-caloric foods as appropriate.     INTERVENTIONS:  - Monitor oral intake, urinary output, labs, and treatment plans  - Assess nutrition and hydration status and recommend course of action  - Evaluate amount of meals eaten  - Assist patient with eating if necessary   - Allow adequate time for meals  - Recommend/ encourage appropriate diets, oral nutritional supplements, and vitamin/mineral supplements  - Order, calculate, and assess calorie counts as needed  - Recommend, monitor, and adjust tube feedings and TPN/PPN based on assessed needs  - Assess need for intravenous fluids  - Provide specific nutrition/hydration education as appropriate  - Include patient/family/caregiver in decisions related to nutrition  Outcome: Progressing     Problem: SKIN/TISSUE INTEGRITY - ADULT  Goal: Skin Integrity remains intact(Skin Breakdown Prevention)  Description: Assess:  -Perform Brooks assessment every shift  -Clean and moisturize skin every day   -Inspect skin when repositioning, toileting, and assisting with ADLS  -Assess under medical devices such as wound bandages every shift  -Assess extremities for adequate circulation and sensation     Bed Management:  -Have minimal linens on bed & keep smooth, unwrinkled  -Change linens as needed when moist or perspiring  -Avoid sitting or lying in one position for more than 2 hours while in bed  -Keep HOB at 30degrees     Toileting:  -Offer bedside commode  -Assess for incontinence every 4 hrs   -Use incontinent care products after each incontinent episode such as foam cleanser     Activity:  -Mobilize patient 3 times a day  -Encourage activity and walks on unit  -Encourage or provide ROM exercises   -Turn and reposition patient every 2 Hours  -Use appropriate equipment to lift or move patient in bed  -Instruct/ Assist with  weight shifting every 2 when out of bed in chair  -Consider limitation of chair time 2 hour intervals    Skin Care:  -Avoid use of baby powder, tape, friction and shearing, hot water or constrictive clothing  -Relieve pressure over bony prominences using allyvn   -Do not massage red bony areas    Next Steps:  -Teach patient strategies to minimize risks such as .   -Consider consults to  interdisciplinary teams such as .  Outcome: Progressing  Goal: Incision(s), wounds(s) or drain site(s) healing without S/S of infection  Description: INTERVENTIONS  - Assess and document dressing, incision, wound bed, drain sites and surrounding tissue  - Provide patient and family education  - Perform skin care/dressing changes every .  Outcome: Progressing

## 2024-01-26 NOTE — ASSESSMENT & PLAN NOTE
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 for an [id number] bypass vs axillary-bifemoral bypass   Case reviewed with Dr. Bhatti with the [facility] internal medicine service at the [facility].  Patient has been accepted in transfer to [facility] for higher level of care and the need for surgery, however at this time we are still awaiting for a bed to be made available

## 2024-01-26 NOTE — PLAN OF CARE
Problem: Prexisting or High Potential for Compromised Skin Integrity  Goal: Skin integrity is maintained or improved  Description: INTERVENTIONS:  - Identify patients at risk for skin breakdown  - Assess and monitor skin integrity  - Assess and monitor nutrition and hydration status  - Monitor labs   - Assess for incontinence   - Turn and reposition patient  - Assist with mobility/ambulation  - Relieve pressure over bony prominences  - Avoid friction and shearing  - Provide appropriate hygiene as needed including keeping skin clean and dry  - Evaluate need for skin moisturizer/barrier cream  - Collaborate with interdisciplinary team   - Patient/family teaching  - Consider wound care consult   Outcome: Progressing     Problem: INFECTION - ADULT  Goal: Absence or prevention of progression during hospitalization  Description: INTERVENTIONS:  - Assess and monitor for signs and symptoms of infection  - Monitor lab/diagnostic results  - Monitor all insertion sites, i.e. indwelling lines, tubes, and drains  - Monitor endotracheal if appropriate and nasal secretions for changes in amount and color  - [place] appropriate cooling/warming therapies per order  - Administer medications as ordered  - Instruct and encourage patient and family to use good hand hygiene technique  - Identify and instruct in appropriate isolation precautions for identified infection/condition  Outcome: Progressing  Goal: Absence of fever/infection during neutropenic period  Description: INTERVENTIONS:  - Monitor WBC    Outcome: Progressing

## 2024-01-27 LAB
ANION GAP SERPL CALCULATED.3IONS-SCNC: 7 MMOL/L
BASOPHILS # BLD AUTO: 0.02 THOUSANDS/ÂΜL (ref 0–0.1)
BASOPHILS NFR BLD AUTO: 0 % (ref 0–1)
BUN SERPL-MCNC: 8 MG/DL (ref 5–25)
CALCIUM SERPL-MCNC: 10 MG/DL (ref 8.4–10.2)
CHLORIDE SERPL-SCNC: 103 MMOL/L (ref 96–108)
CO2 SERPL-SCNC: 30 MMOL/L (ref 21–32)
CREAT SERPL-MCNC: 0.51 MG/DL (ref 0.6–1.3)
EOSINOPHIL # BLD AUTO: 0.46 THOUSAND/ÂΜL (ref 0–0.61)
EOSINOPHIL NFR BLD AUTO: 6 % (ref 0–6)
ERYTHROCYTE [DISTWIDTH] IN BLOOD BY AUTOMATED COUNT: 12.9 % (ref 11.6–15.1)
FLUAV RNA RESP QL NAA+PROBE: NEGATIVE
FLUBV RNA RESP QL NAA+PROBE: NEGATIVE
GFR SERPL CREATININE-BSD FRML MDRD: 93 ML/MIN/1.73SQ M
GLUCOSE SERPL-MCNC: 112 MG/DL (ref 65–140)
GLUCOSE SERPL-MCNC: 115 MG/DL (ref 65–140)
GLUCOSE SERPL-MCNC: 146 MG/DL (ref 65–140)
GLUCOSE SERPL-MCNC: 190 MG/DL (ref 65–140)
GLUCOSE SERPL-MCNC: 76 MG/DL (ref 65–140)
HCT VFR BLD AUTO: 43.6 % (ref 34.8–46.1)
HGB BLD-MCNC: 13.8 G/DL (ref 11.5–15.4)
IMM GRANULOCYTES # BLD AUTO: 0.02 THOUSAND/UL (ref 0–0.2)
IMM GRANULOCYTES NFR BLD AUTO: 0 % (ref 0–2)
LYMPHOCYTES # BLD AUTO: 1.7 THOUSANDS/ÂΜL (ref 0.6–4.47)
LYMPHOCYTES NFR BLD AUTO: 21 % (ref 14–44)
MAGNESIUM SERPL-MCNC: 1.7 MG/DL (ref 1.9–2.7)
MCH RBC QN AUTO: 30.7 PG (ref 26.8–34.3)
MCHC RBC AUTO-ENTMCNC: 31.7 G/DL (ref 31.4–37.4)
MCV RBC AUTO: 97 FL (ref 82–98)
MONOCYTES # BLD AUTO: 0.53 THOUSAND/ÂΜL (ref 0.17–1.22)
MONOCYTES NFR BLD AUTO: 7 % (ref 4–12)
NEUTROPHILS # BLD AUTO: 5.22 THOUSANDS/ÂΜL (ref 1.85–7.62)
NEUTS SEG NFR BLD AUTO: 66 % (ref 43–75)
NRBC BLD AUTO-RTO: 0 /100 WBCS
PHOSPHATE SERPL-MCNC: 3.6 MG/DL (ref 2.3–4.1)
PLATELET # BLD AUTO: 349 THOUSANDS/UL (ref 149–390)
PMV BLD AUTO: 9.5 FL (ref 8.9–12.7)
POTASSIUM SERPL-SCNC: 3.7 MMOL/L (ref 3.5–5.3)
RBC # BLD AUTO: 4.5 MILLION/UL (ref 3.81–5.12)
RSV RNA RESP QL NAA+PROBE: NEGATIVE
SARS-COV-2 RNA RESP QL NAA+PROBE: NEGATIVE
SODIUM SERPL-SCNC: 140 MMOL/L (ref 135–147)
WBC # BLD AUTO: 7.95 THOUSAND/UL (ref 4.31–10.16)

## 2024-01-27 PROCEDURE — 0241U HB NFCT DS VIR RESP RNA 4 TRGT: CPT | Performed by: PHYSICIAN ASSISTANT

## 2024-01-27 PROCEDURE — 99232 SBSQ HOSP IP/OBS MODERATE 35: CPT | Performed by: PHYSICIAN ASSISTANT

## 2024-01-27 PROCEDURE — 99233 SBSQ HOSP IP/OBS HIGH 50: CPT

## 2024-01-27 PROCEDURE — 82948 REAGENT STRIP/BLOOD GLUCOSE: CPT

## 2024-01-27 PROCEDURE — 84100 ASSAY OF PHOSPHORUS: CPT | Performed by: GENERAL PRACTICE

## 2024-01-27 PROCEDURE — 83735 ASSAY OF MAGNESIUM: CPT | Performed by: GENERAL PRACTICE

## 2024-01-27 PROCEDURE — 80048 BASIC METABOLIC PNL TOTAL CA: CPT | Performed by: GENERAL PRACTICE

## 2024-01-27 PROCEDURE — 97163 PT EVAL HIGH COMPLEX 45 MIN: CPT

## 2024-01-27 PROCEDURE — 99223 1ST HOSP IP/OBS HIGH 75: CPT | Performed by: PODIATRIST

## 2024-01-27 PROCEDURE — 85025 COMPLETE CBC W/AUTO DIFF WBC: CPT | Performed by: GENERAL PRACTICE

## 2024-01-27 PROCEDURE — 97110 THERAPEUTIC EXERCISES: CPT

## 2024-01-27 RX ADMIN — SENNOSIDES 8.6 MG: 8.6 TABLET, FILM COATED ORAL at 21:55

## 2024-01-27 RX ADMIN — ATORVASTATIN CALCIUM 10 MG: 10 TABLET, FILM COATED ORAL at 17:12

## 2024-01-27 RX ADMIN — AMILORIDE HYDROCLORIDE 5 MG: 5 TABLET ORAL at 08:49

## 2024-01-27 RX ADMIN — PREGABALIN 200 MG: 100 CAPSULE ORAL at 17:12

## 2024-01-27 RX ADMIN — BUPROPION HYDROCHLORIDE 300 MG: 150 TABLET, FILM COATED, EXTENDED RELEASE ORAL at 08:49

## 2024-01-27 RX ADMIN — CLONAZEPAM 1 MG: 1 TABLET ORAL at 17:12

## 2024-01-27 RX ADMIN — ENOXAPARIN SODIUM 40 MG: 40 INJECTION SUBCUTANEOUS at 08:48

## 2024-01-27 RX ADMIN — DOCUSATE SODIUM 100 MG: 100 CAPSULE, LIQUID FILLED ORAL at 17:12

## 2024-01-27 RX ADMIN — FLUTICASONE FUROATE AND VILANTEROL TRIFENATATE 1 PUFF: 200; 25 POWDER RESPIRATORY (INHALATION) at 08:49

## 2024-01-27 RX ADMIN — INSULIN LISPRO 5 UNITS: 100 INJECTION, SOLUTION INTRAVENOUS; SUBCUTANEOUS at 13:59

## 2024-01-27 RX ADMIN — NICOTINE 1 PATCH: 21 PATCH, EXTENDED RELEASE TRANSDERMAL at 08:49

## 2024-01-27 RX ADMIN — LISINOPRIL 5 MG: 5 TABLET ORAL at 08:49

## 2024-01-27 RX ADMIN — OMEGA-3 FATTY ACIDS CAP 1000 MG 1000 MG: 1000 CAP at 08:49

## 2024-01-27 RX ADMIN — PREGABALIN 200 MG: 100 CAPSULE ORAL at 21:54

## 2024-01-27 RX ADMIN — PANTOPRAZOLE SODIUM 40 MG: 40 TABLET, DELAYED RELEASE ORAL at 08:58

## 2024-01-27 RX ADMIN — INSULIN LISPRO 1 UNITS: 100 INJECTION, SOLUTION INTRAVENOUS; SUBCUTANEOUS at 21:55

## 2024-01-27 RX ADMIN — DOCUSATE SODIUM 100 MG: 100 CAPSULE, LIQUID FILLED ORAL at 08:49

## 2024-01-27 RX ADMIN — INSULIN LISPRO 5 UNITS: 100 INJECTION, SOLUTION INTRAVENOUS; SUBCUTANEOUS at 08:49

## 2024-01-27 RX ADMIN — ASPIRIN 81 MG: 81 TABLET, COATED ORAL at 08:49

## 2024-01-27 RX ADMIN — PREGABALIN 200 MG: 100 CAPSULE ORAL at 08:49

## 2024-01-27 RX ADMIN — OXYBUTYNIN CHLORIDE 5 MG: 5 TABLET, EXTENDED RELEASE ORAL at 08:49

## 2024-01-27 NOTE — UTILIZATION REVIEW
Initial Clinical Review    The patient was transferred to [facility] on 1/26 from [facility], where care began on 1/23. 3 midnights have already been surpassed with active ongoing care.      Admission: Date/Time/Statement:   Admission Orders (From admission, onward)       Ordered        01/26/24 2056  Inpatient Admission  Once                          Orders Placed This Encounter   Procedures    Inpatient Admission     Standing Status:   Standing     Number of Occurrences:   1     Order Specific Question:   Level of Care     Answer:   Med Surg [16]     Order Specific Question:   Estimated length of stay     Answer:   More than 2 Midnights     Order Specific Question:   Certification     Answer:   I certify that inpatient services are medically necessary for this patient for a duration of greater than two midnights. See H&P and MD Progress Notes for additional information about the patient's course of treatment.     ED Arrival Information       Patient not seen in ED                         Initial Presentation: 78 y.o. female, Transfer from [facility] med surg unit, initially presented there on 1/23 diabetic ulcer toe, and nonhealing wound of the right ankle. Pt found with right foot osteomyelitis. Vascular surgery was consulted due to PAD and decision made to pursue bypass at [facility].  PMH for PAD, DM,  GERD, COPD, R ankle fx s/p ORIF '08, AOID with nonhealing R foot wounds.   Admit Inpatient level of care for Diabetic foot ulcer with osteomyelitis and Constipation. Vascular Surgery consult for Bypass before Podiatry takes to OR. RLE NWB. Monitor off antibiotics per ID at [facility]. Add colace/ senna.     1/27  Vascular surgery cons; Pt endorses delayed wound healing following previous ankle fracture repair that required continued wound care follow-up, eventual healing. Now developed wound to R foot 3-4 months ago, without healing. Ambulates via rolling  walker.   R bone changes concerning for 2nd toe OM, would benefit from revascularization. Recommend continue LWC with Podiatry. Continue aspirin/ statin. Cardiology consult for risk stratification.   Discussed options for revascularization including aorto-bifemoral vs. Ax-bifemoral bypass, timing to be determined.    Date: 1/25    Day 3: Has surpassed a 2nd midnight with active treatments and services, which include ongoing workup and treat for delayed wound healing..    Vitals   Temperature Pulse Respirations Blood Pressure SpO2   01/26/24 2109 01/26/24 2109 01/27/24 0736 01/26/24 2109 01/26/24 2109   98.1 °F (36.7 °C) 75 16 131/64 92 %      Temp src Heart Rate Source Patient Position - Orthostatic VS BP Location FiO2 (%)   -- -- -- -- --             Pain Score       01/26/24 2102       4          Wt Readings from Last 1 Encounters:   01/23/24 66.1 kg (145 lb 11.6 oz)     Additional Vital Signs:   01/27/24 07:36:17 97.4 °F (36.3 °C) Abnormal  80 16 144/69 94 96 %   01/26/24 22:36:11 98.5 °F (36.9 °C) 70 -- 134/64 87 93 %   01/26/24 21:09:56 98.1 °F (36.7 °C) 72 -- 131/64 86 93 %     Pertinent Labs/Diagnostic Test Results:   No orders to display         Results from last 7 days   Lab Units 01/27/24  0529 01/26/24  0443   WBC Thousand/uL 7.95 7.38   HEMOGLOBIN g/dL 13.8 12.1   HEMATOCRIT % 43.6 36.7   PLATELETS Thousands/uL 349 322   NEUTROS ABS Thousands/µL 5.22 4.87         Results from last 7 days   Lab Units 01/27/24  0529 01/26/24  0443   SODIUM mmol/L 140 137   POTASSIUM mmol/L 3.7 4.1   CHLORIDE mmol/L 103 105   CO2 mmol/L 30 27   ANION GAP mmol/L 7 5   BUN mg/dL 8 11   CREATININE mg/dL 0.51* 0.48*   EGFR ml/min/1.73sq m 93 94   CALCIUM mg/dL 10.0 9.1   MAGNESIUM mg/dL 1.7*  --    PHOSPHORUS mg/dL 3.6  --        Results from last 7 days   Lab Units 01/27/24  0542 01/26/24  2140 01/26/24  1609 01/26/24  1119 01/26/24  0712   POC GLUCOSE mg/dl 112 144* 154* 119 117     Results from last 7 days   Lab Units  01/27/24  0529 01/26/24  0443   GLUCOSE RANDOM mg/dL 115 115         Past Medical History:   Diagnosis Date    Anxiety     Closed fracture of coccyx ([facility]) 05/24/2023    Diabetes mellitus ([facility])     GERD (gastroesophageal reflux disease)     Hyperlipidemia     Hypertension     IBS (irritable bowel syndrome)     Shoulder fracture      Present on Admission:   Diabetic foot ulcer with osteomyelitis ([facility])   PAD (peripheral artery disease) ([facility])   Type 2 diabetes mellitus with diabetic polyneuropathy ([facility])   Tobacco use disorder      Admitting Diagnosis: Diabetic foot ulcer with osteomyelitis ([facility]) [E11.621, E11.69, L97.509, M86.9]  Age/Sex: 78 y.o. female    Admission Orders:  Scheduled Medications:  AMILoride, 5 mg, Oral, Daily  aspirin, 81 mg, Oral, Daily  atorvastatin, 10 mg, Oral, Daily With Dinner  buPROPion, 300 mg, Oral, Daily  clonazePAM, 1 mg, Oral, QPM  docusate sodium, 100 mg, Oral, BID  enoxaparin, 40 mg, Subcutaneous, Daily  fish oil, 1,000 mg, Oral, Daily  fluticasone-vilanterol, 1 puff, Inhalation, Daily  insulin lispro, 1-5 Units, Subcutaneous, TID AC  insulin lispro, 1-5 Units, Subcutaneous, HS  insulin lispro, 5 Units, Subcutaneous, TID With Meals  lisinopril, 5 mg, Oral, Daily  nicotine, 1 patch, Transdermal, Daily  oxybutynin, 5 mg, Oral, Daily  pantoprazole, 40 mg, Oral, Early Morning  pregabalin, 200 mg, Oral, TID  senna, 1 tablet, Oral, HS      Continuous IV Infusions: None     PRN Meds:  acetaminophen, 650 mg, Oral, Q4H PRN  albuterol, 2 puff, Inhalation, Q6H PRN  ondansetron, 4 mg, Intravenous, Q6H PRN        IP CONSULT TO VASCULAR SURGERY  IP CONSULT TO PODIATRY    Network Utilization Review Department  ATTENTION: Please call with any questions or concerns to [phone] and carefully listen to the prompts so that you are directed to the right person. All voicemails are confidential.   For Discharge needs, contact Care Management DC Support Team at [phone] opt. [phone]  Send all requests for  admission clinical reviews, approved or denied determinations and any other requests to dedicated fax number below belonging to the campus where the patient is receiving treatment. List of dedicated fax numbers for the Facilities:  FACILITY NAME UR FAX NUMBER   ADMISSION DENIALS (Administrative/Medical Necessity) [phone]   DISCHARGE SUPPORT TEAM (NETWORK) [phone]   PARENT CHILD HEALTH (Maternity/NICU/Pediatrics) [phone]   [facility] [phone]   [facility] [phone]   [facility] [phone]   [facility] [phone]   [facility] [phone]   [facility] [phone]   [facility] [phone]   [facility] [phone]   [facility] [phone]   [facility] [phone]   [facility] [phone]   [facility] [phone]

## 2024-01-27 NOTE — ASSESSMENT & PLAN NOTE
Lab Results   Component Value Date    HGBA1C 6.5 (H) 11/07/2023       Recent Labs     01/25/24 2032 01/26/24  0712 01/26/24  1119 01/26/24  1609   POCGLU 125 117 119 154*       Blood Sugar Average: Last 72 hrs:    C/w Humalog AC  C/w ISS

## 2024-01-27 NOTE — ASSESSMENT & PLAN NOTE
Lab Results   Component Value Date    HGBA1C 6.5 (H) 11/07/2023       Recent Labs     01/26/24  1119 01/26/24  1609 01/26/24  2140 01/27/24  0542   POCGLU 119 154* 144* 112         Blood Sugar Average: Last 72 hrs:  (P) 128  Will need bypass w/ vascular before podiatry takes to OR  Cardio provided clearance for any procedures needed  RLE NWB  [place] [facility] [place] abx

## 2024-01-27 NOTE — ASSESSMENT & PLAN NOTE
Lab Results   Component Value Date    HGBA1C 6.5 (H) 11/07/2023       Recent Labs     01/25/24 2032 01/26/24  0712 01/26/24  1119 01/26/24  1609   POCGLU 125 117 119 154*       Blood Sugar Average: Last 72 hrs:    Will need bypass w/ vascular before podiatry takes to OR  Cardio provided clearance for any procedures needed  RLE NWB  [place] [facility] [place] and rec to watch off abx

## 2024-01-27 NOTE — PLAN OF CARE
Problem: PHYSICAL THERAPY ADULT  Goal: Performs mobility at highest level of function for planned discharge setting.  See evaluation for individualized goals.  Description: Treatment/Interventions: Functional transfer training, Therapeutic exercise, Endurance training, Gait training, Bed mobility, Equipment eval/education, Elevations  Equipment Recommended: Walker       See flowsheet documentation for full assessment, interventions and recommendations.  Outcome: Progressing  Note: Prognosis: Fair     Assessment: Pt is 78 y.o. female seen for PT evaluation s/p admit to [facility] on 1/26/2024 w/ Diabetic foot ulcer with osteomyelitis (HCC). PT consulted to assess pt's functional mobility and d/c needs. Order placed for PT eval and tx, w/ NWB RLE order. Pt agreeable to PT  session upon arrival, pt found supine in bed.  PTA, pt was independent w/ all functional mobility w/ no DA, ambulates unrestricted distances and all terrain, has 10 PATO, lives w/ family in 1 level home, and retired.  Pt to benefit from continued PT tx to address deficits and maximize level of functional independent mobility and consistency. Upon conclusion pt  seated in recliner. Complexity: Comorbidities affecting pt's physical performance at time of assessment include: COPD, DM, anxiety, and PAD . Personal factors affecting pt at time of IE include: active prior to admission, limited mobility, ambulating with assistive device, steps to enter home, inability to ambulate household distances, anxiety, and unable to perform caregiver tasks. Please find objective findings from PT assessment regarding body systems outlined above with impairments and limitations including impaired balance, decreased endurance, gait deviations, decreased activity tolerance, decreased functional mobility tolerance, altered sensation, fall risk, and decreased skin integrity.  Pt's clinical presentation is currently unstable/unpredictable seen in pt's  presentation of abnormal blood sugar levels, wounds, and pending procedures. The patient's AM-PAC Basic Mobility Inpatient Short Form Raw Score is 17.  Based on patient presentations and impairments, pt would most appropriately benefit from Level 2 resource intensity upon discharge. Please also refer to the recommendation of the Physical Therapist for safe discharge planning. RN verbalized pt appropriate for PT session.  Barriers to Discharge: Inaccessible home environment (pt unsure of how she will be able to perform steps to get into the home)     Rehab Resource Intensity Level, PT: II (Moderate Resource Intensity)    See flowsheet documentation for full assessment.

## 2024-01-27 NOTE — PROGRESS NOTES
[facility]  Progress Note  Name: Lona Cedeno I  MRN: [id number]  Unit/Bed#: -01 I Date of Admission: 1/26/2024   Date of Service: 1/27/2024  Hospital Day: 1    Assessment/Plan   * Diabetic foot ulcer with osteomyelitis (HCC)  Assessment & Plan  Lab Results   Component Value Date    HGBA1C 6.5 (H) 11/07/2023       Recent Labs     01/26/24  1119 01/26/24  1609 01/26/24  2140 01/27/24  0542   POCGLU 119 154* 144* 112         Blood Sugar Average: Last 72 hrs:  (P) 128  Will need bypass w/ vascular before podiatry takes to OR  Cardio provided clearance for any procedures needed  RLE NWB  [facility] and rec to watch off abx    Constipation  Assessment & Plan  Add colace/senna    Tobacco use disorder  Assessment & Plan  Nicotine TD    PAD (peripheral artery disease) ([facility])  Assessment & Plan  ASA and Lipitor  LDL 80  Vascular consulted for bypass which will be done after the weekend  [facility] cleared for bypass    Type 2 diabetes mellitus with diabetic polyneuropathy ([facility])  Assessment & Plan  Lab Results   Component Value Date    HGBA1C 6.5 (H) 11/07/2023       Recent Labs     01/26/24  1119 01/26/24  1609 01/26/24  2140 01/27/24  0542   POCGLU 119 154* 144* 112         Blood Sugar Average: Last 72 hrs:  (P) 128  C/w Humalog AC  C/w ISS           VTE Pharmacologic Prophylaxis: VTE Score: 4 Moderate Risk (Score 3-4) - Pharmacological DVT Prophylaxis Ordered: enoxaparin (Lovenox).    Mobility:   Basic Mobility Inpatient Raw Score: 20  JH-HLM Goal: 6: Walk 10 steps or more  JH-HLM Achieved: 6: Walk 10 steps or more  HLM Goal achieved. Continue to encourage appropriate mobility.    Patient Centered Rounds: I performed bedside rounds with nursing staff today.   Discussions with Specialists or Other Care Team Provider: RN     Education and Discussions with Family / Patient: Patient declined call to .     Total Time Spent on Date of Encounter in care of  patient: 20 mins. This time was spent on one or more of the following: performing physical exam; counseling and coordination of care; obtaining or reviewing history; documenting in the medical record; reviewing/ordering tests, medications or procedures; communicating with other healthcare professionals and discussing with patient's family/caregivers.    Current Length of Stay: 1 day(s)  Current Patient Status: Inpatient   Certification Statement: The patient will continue to require additional inpatient hospital stay due to OM of foot, needs revascularization  Discharge Plan: Anticipate discharge in >72 hrs to discharge location to be determined pending rehab evaluations.    Code Status: Level 2 - DNAR: but accepts endotracheal intubation    Subjective:   No acute complaints, doing ok.  She does note some rhinorrhea that started today     Objective:     Vitals:   Temp (24hrs), Av.1 °F (36.7 °C), Min:97.4 °F (36.3 °C), Max:98.5 °F (36.9 °C)    Temp:  [97.4 °F (36.3 °C)-98.5 °F (36.9 °C)] 97.4 °F (36.3 °C)  HR:  [70-80] 80  Resp:  [16] 16  BP: (130-144)/(57-69) 144/69  SpO2:  [92 %-96 %] 96 %  There is no height or weight on file to calculate BMI.     Input and Output Summary (last 24 hours):   No intake or output data in the 24 hours ending 24 7218    Physical Exam:   Physical Exam  Vitals reviewed.   Constitutional:       General: She is not in acute distress.     Appearance: She is not toxic-appearing.   HENT:      Head: Normocephalic and atraumatic.   Eyes:      Extraocular Movements: Extraocular movements intact.   Cardiovascular:      Rate and Rhythm: Normal rate and regular rhythm.   Pulmonary:      Effort: Pulmonary effort is normal. No respiratory distress.      Breath sounds: Normal breath sounds.   Abdominal:      General: Bowel sounds are normal. There is no distension.      Palpations: Abdomen is soft.   Musculoskeletal:         General: Normal range of motion.      Comments: RLE wrapped     Neurological:      General: No focal deficit present.      Mental Status: She is alert and oriented to person, place, and time.   Psychiatric:         Mood and Affect: Mood normal.         Behavior: Behavior normal.          Additional Data:     Labs:  Results from last 7 days   Lab Units 01/27/24  0529   WBC Thousand/uL 7.95   HEMOGLOBIN g/dL 13.8   HEMATOCRIT % 43.6   PLATELETS Thousands/uL 349   NEUTROS PCT % 66   LYMPHS PCT % 21   MONOS PCT % 7   EOS PCT % 6     Results from last 7 days   Lab Units 01/27/24  0529 01/24/24  0505 01/23/24  1940   SODIUM mmol/L 140   < > 135   POTASSIUM mmol/L 3.7   < > 4.5   CHLORIDE mmol/L 103   < > 99   CO2 mmol/L 30   < > 25   BUN mg/dL 8   < > 12   CREATININE mg/dL 0.51*   < > 0.54*   ANION GAP mmol/L 7   < > 11   CALCIUM mg/dL 10.0   < > 10.3*   ALBUMIN g/dL  --   --  4.2   TOTAL BILIRUBIN mg/dL  --   --  0.21   ALK PHOS U/L  --   --  64   ALT U/L  --   --  7   AST U/L  --   --  10*   GLUCOSE RANDOM mg/dL 115   < > 120    < > = values in this interval not displayed.     Results from last 7 days   Lab Units 01/23/24  1940   INR  1.03     Results from last 7 days   Lab Units 01/27/24  1044 01/27/24  0542 01/26/24  2140 01/26/24  1609 01/26/24  1119 01/26/24  0712 01/25/24  2032 01/25/24  1610 01/25/24  1123 01/25/24  0711 01/24/24  2224 01/24/24  1556   POC GLUCOSE mg/dl 146* 112 144* 154* 119 117 125 102 133 94 98 73         Results from last 7 days   Lab Units 01/24/24  0505 01/23/24  2223 01/23/24  1940   LACTIC ACID mmol/L  --  1.7 3.3*   PROCALCITONIN ng/ml <0.05  --  <0.05       Lines/Drains:  Invasive Devices       Peripheral Intravenous Line  Duration             Peripheral IV 01/23/24 Right;Ventral (anterior) Forearm 3 days    Peripheral IV 01/25/24 Distal;Dorsal (posterior);Right Forearm 2 days              Drain  Duration             Ureteral Internal Stent Left ureter 6 Fr. 68 days                          Imaging: No pertinent imaging reviewed.    Recent Cultures  (last 7 days):   Results from last 7 days   Lab Units 01/23/24 1952 01/23/24 1945 01/23/24 1940   BLOOD CULTURE  No Growth at 72 hrs.  --  No Growth at 72 hrs.   GRAM STAIN RESULT   --  No Polys or Bacteria seen  --    WOUND CULTURE   --  1+ Growth of  --        Last 24 Hours Medication List:   Current Facility-Administered Medications   Medication Dose Route Frequency Provider Last Rate    acetaminophen  650 mg Oral Q4H PRN Cristian Cerna, DO      albuterol  2 puff Inhalation Q6H PRN Cristian Cerna, DO      AMILoride  5 mg Oral Daily Cristian Cerna, DO      aspirin  81 mg Oral Daily Cristian Cerna, DO      atorvastatin  10 mg Oral Daily With Dinner Cristian Cerna, DO      buPROPion  300 mg Oral Daily Cristian Cerna, DO      clonazePAM  1 mg Oral QPM Cristian Cerna, DO      docusate sodium  100 mg Oral BID Cristian Cerna, DO      enoxaparin  40 mg Subcutaneous Daily Cristian Cerna, DO      fish oil  1,000 mg Oral Daily Cristian Cerna, DO      fluticasone-vilanterol  1 puff Inhalation Daily Cristian Cerna, DO      insulin lispro  1-5 Units Subcutaneous TID AC Cristian Cerna, DO      insulin lispro  1-5 Units Subcutaneous HS Cristian Cerna, DO      insulin lispro  5 Units Subcutaneous TID With Meals Cristian Cerna, DO      lisinopril  5 mg Oral Daily Cristian Cerna, DO      nicotine  1 patch Transdermal Daily Cristian Cerna,       ondansetron  4 mg Intravenous Q6H PRN Cristian Cerna, DO      oxybutynin  5 mg Oral Daily Cristian Cerna, DO      pantoprazole  40 mg Oral Early Morning Cristian Cerna, DO      pregabalin  200 mg Oral TID Cristian Cerna, DO      senna  1 tablet Oral HS Cristian Cerna, DO          Today, Patient Was Seen By: Penny Herr PA-C    **Please Note: This note may have been constructed using a voice recognition system.**

## 2024-01-27 NOTE — H&P
[facility]  H&P  Name: Lona Cedeno 78 y.o. female I MRN: [id number]  Unit/Bed#: -01 I Date of Admission: 1/26/2024   Date of Service: 1/26/2024 I Hospital Day: 0      Assessment/Plan   * Diabetic foot ulcer with osteomyelitis (HCC)  Assessment & Plan  Lab Results   Component Value Date    HGBA1C 6.5 (H) 11/07/2023       Recent Labs     01/25/24 2032 01/26/24  0712 01/26/24  1119 01/26/24  1609   POCGLU 125 117 119 154*       Blood Sugar Average: Last 72 hrs:    Will need bypass w/ vascular before podiatry takes to OR  Cardio provided clearance for any procedures needed  RLE NWB  ID eval at [facility] and rec to watch off abx      Constipation  Assessment & Plan  Add colace/senna    Tobacco use disorder  Assessment & Plan  Nicotine TD    PAD (peripheral artery disease) ([facility])  Assessment & Plan  ASA and Lipitor  LDL 80  Vascular consulted for bypass which will be done after the weekend  Cardio at [facility] cleared for bypass    Type 2 diabetes mellitus with diabetic polyneuropathy (HCC)  Assessment & Plan  Lab Results   Component Value Date    HGBA1C 6.5 (H) 11/07/2023       Recent Labs     01/25/24 2032 01/26/24 0712 01/26/24 1119 01/26/24  1609   POCGLU 125 117 119 154*       Blood Sugar Average: Last 72 hrs:    C/w Humalog AC  C/w ISS           VTE Pharmacologic Prophylaxis: VTE Score: 4 Moderate Risk (Score 3-4) - Pharmacological DVT Prophylaxis Ordered: enoxaparin (Lovenox).  Code Status: Level 2 - DNAR: but accepts endotracheal intubation   Discussion with family: Updated  (daughter and son in law) at bedside.    Anticipated Length of Stay: Patient will be admitted on an inpatient basis with an anticipated length of stay of greater than 2 midnights secondary to need for surgery.        Chief Complaint: need to improve blood flow in leg    History of Present Illness:  Lona Cedeno is a 78 y.o. female with a PMH of PAD and DM who presents with right foot  osteomyelitis.   Vascular surgery was consulted due to PAD and decision made to pursue bypass at [facility].  At time of my encounter, patient offers no acute complaints.  She denies any pain.  She denies any chest pain or shortness of breath.  No nausea or vomiting.    Review of Systems:  Review of Systems   Constitutional: Negative.    HENT: Negative.     Eyes: Negative.    Respiratory: Negative.     Cardiovascular: Negative.    Gastrointestinal: Negative.    Endocrine: Negative.    Genitourinary: Negative.    Musculoskeletal: Negative.    Skin: Negative.    Allergic/Immunologic: Negative.    Neurological: Negative.    Hematological: Negative.    Psychiatric/Behavioral: Negative.         Past Medical and Surgical History:   Past Medical History:   Diagnosis Date    Anxiety     Closed fracture of coccyx (HCC) 2023    Diabetes mellitus ([facility])     GERD (gastroesophageal reflux disease)     Hyperlipidemia     Hypertension     IBS (irritable bowel syndrome)     Shoulder fracture        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 Authorizing Provider   Aspirin (ASPIR-81 PO) take one by mouth daily 14  Yes Historical Provider, MD   acetaminophen (TYLENOL) 325 mg tablet Take 650 mg by mouth  every 6 (six) hours as needed for mild pain    Historical Provider, MD   albuterol (Ventolin HFA) 90 mcg/act inhaler Inhale 2 puffs every 6 (six) hours as needed for wheezing 11/10/23   Vivek Preston DO   AMILoride 5 mg tablet Take 1 tablet (5 mg total) by mouth daily 10/10/23   Alexander Cespedes MD   Apoaequorin (Prevagen) 10 MG CAPS Take by mouth    Historical Provider, MD   atorvastatin (LIPITOR) 10 mg tablet Take 1 tablet (10 mg total) by mouth daily with dinner 9/27/23   Vivek Preston DO   Blood Glucose Monitoring Suppl (ONETOUCH VERIO) w/Device KIT by Does not apply route 2 (two) times a day 1/7/20   Vivek Preston DO   buPROPion (WELLBUTRIN XL) 300 mg 24 hr tablet Take 1 tablet (300 mg total) by mouth daily 11/28/23   Vivek Preston DO   clonazePAM (KlonoPIN) 1 mg tablet Take 1 tablet (1 mg total) by mouth every evening 1/25/24   Vivek Preston DO   Fluticasone-Salmeterol (Advair Diskus) 250-50 mcg/dose inhaler Inhale 1 puff 2 (two) times a day Rinse mouth after use. 11/10/23 12/10/23  Vivek Preston DO   glucose blood (OneTouch Verio) test strip TEST BLOOD GLUCOSE TWO TIMES A DAY 12/8/23   Vivek Preston DO   Januvia 100 MG tablet Take 1 tablet (100 mg total) by mouth daily 8/21/23   Vivek Preston DO   Lancets (OneTouch Delica Plus Mimixf28M) MISC TEST TWO TIMES A DAY 12/8/23   Vivek Preston DO   lisinopril (ZESTRIL) 5 mg tablet Take 1 tablet (5 mg total) by mouth daily 10/10/23   Alexander Cespedes MD   metFORMIN (GLUCOPHAGE-XR) 500 mg 24 hr tablet Take 2 tablets (1,000 mg total) by mouth 2 (two) times a day 11/10/23   Vivek Preston DO   Misc. Devices (Carex Coccyx Cushion) MISC Use in the morning 5/24/23   ROXANE Garcia   Omega-3 Fatty Acids (FISH OIL PO)  3/24/14   Historical Provider, MD   pantoprazole (PROTONIX) 40 mg tablet 40 mg 10/31/23   Historical Provider, MD   potassium chloride (K-DUR,KLOR-CON) 10 mEq tablet Take 1 tablet (10 mEq total) by mouth 2 (two)  times a day 11/28/23   Vivek Preston DO   pregabalin (LYRICA) 200 MG capsule TAKE ONE CAPSULE BY MOUTH THREE TIMES A DAY 8/21/23   Vivek Preston DO   solifenacin (VESICARE) 5 mg tablet Take 1 tablet (5 mg total) by mouth daily 11/28/23   Vivek Preston DO     I have reviewed home medications using recent Epic encounter.    Allergies:   Allergies   Allergen Reactions    Paroxetine Other (See Comments)     Became suicidal    Adhesive [Medical Tape] Itching and Blisters    Carafate [Sucralfate] Other (See Comments)     Mouth sores, tongue sore    Sulfa Antibiotics Hives and Rash    Sulfamethoxazole-Trimethoprim Hives       Social History:  Marital Status: /Civil Union     Substance Use History:   Social History     Substance and Sexual Activity   Alcohol Use Not Currently     Social History     Tobacco Use   Smoking Status Every Day    Current packs/day: 1.00    Average packs/day: 1 pack/day for 63.1 years (63.1 ttl pk-yrs)    Types: Cigarettes    Start date: 1961   Smokeless Tobacco Never   Tobacco Comments    11/14/23 smokes about 3/4 PPD     Social History     Substance and Sexual Activity   Drug Use Never       Family History:  Family History   Problem Relation Age of Onset    COPD Mother     Emphysema Mother     COPD Father     Emphysema Father        Physical Exam:     Vitals:   Blood Pressure: 131/64 (01/26/24 2109)  Pulse: 72 (01/26/24 2109)  Temperature: 98.1 °F (36.7 °C) (01/26/24 2109)  SpO2: 93 % (01/26/24 2109)    Physical Exam  HENT:      Head: Normocephalic and atraumatic.      Nose: Nose normal.      Mouth/Throat:      Mouth: Mucous membranes are moist.   Eyes:      Extraocular Movements: Extraocular movements intact.      Conjunctiva/sclera: Conjunctivae normal.   Cardiovascular:      Rate and Rhythm: Normal rate and regular rhythm.   Pulmonary:      Effort: Pulmonary effort is normal.      Breath sounds: Normal breath sounds.   Abdominal:      General: Bowel sounds are normal.       Palpations: Abdomen is soft.   Musculoskeletal:         General: Normal range of motion.      Cervical back: Normal range of motion and neck supple.      Right lower leg: No edema.      Left lower leg: No edema.   Skin:     Comments: RLE dressing c/d/i   Neurological:      Mental Status: She is alert and oriented to person, place, and time.          Additional Data:     Lab Results:  Results from last 7 days   Lab Units 01/26/24  0443   WBC Thousand/uL 7.38   HEMOGLOBIN g/dL 12.1   HEMATOCRIT % 36.7   PLATELETS Thousands/uL 322   NEUTROS PCT % 66   LYMPHS PCT % 21   MONOS PCT % 7   EOS PCT % 5     Results from last 7 days   Lab Units 01/26/24  0443 01/24/24  0505 01/23/24  1940   SODIUM mmol/L 137   < > 135   POTASSIUM mmol/L 4.1   < > 4.5   CHLORIDE mmol/L 105   < > 99   CO2 mmol/L 27   < > 25   BUN mg/dL 11   < > 12   CREATININE mg/dL 0.48*   < > 0.54*   ANION GAP mmol/L 5   < > 11   CALCIUM mg/dL 9.1   < > 10.3*   ALBUMIN g/dL  --   --  4.2   TOTAL BILIRUBIN mg/dL  --   --  0.21   ALK PHOS U/L  --   --  64   ALT U/L  --   --  7   AST U/L  --   --  10*   GLUCOSE RANDOM mg/dL 115   < > 120    < > = values in this interval not displayed.     Results from last 7 days   Lab Units 01/23/24  1940   INR  1.03     Results from last 7 days   Lab Units 01/26/24  1609 01/26/24  1119 01/26/24  0712 01/25/24  2032 01/25/24  1610 01/25/24  1123 01/25/24  0711 01/24/24  2224 01/24/24  1556 01/24/24  1058 01/24/24  0707 01/23/24  2215   POC GLUCOSE mg/dl 154* 119 117 125 102 133 94 98 73 121 91 87         Results from last 7 days   Lab Units 01/24/24  0505 01/23/24  2223 01/23/24  1940   LACTIC ACID mmol/L  --  1.7 3.3*   PROCALCITONIN ng/ml <0.05  --  <0.05       Lines/Drains:  Invasive Devices       Peripheral Intravenous Line  Duration             Peripheral IV 01/23/24 Right;Ventral (anterior) Forearm 3 days    Peripheral IV 01/25/24 Distal;Dorsal (posterior);Right Forearm 1 day              Drain  Duration              Ureteral Internal Stent Left ureter 6 Fr. 67 days                        Imaging: Reviewed radiology reports from this admission including: abdominal/pelvic CT and MRI foot  No orders to display       EKG and Other Studies Reviewed on Admission:   EKG:  NSR.    ** Please Note: This note has been constructed using a voice recognition system. **

## 2024-01-27 NOTE — ASSESSMENT & PLAN NOTE
Lab Results   Component Value Date    HGBA1C 6.5 (H) 11/07/2023       Recent Labs     01/26/24  1119 01/26/24  1609 01/26/24  2140 01/27/24  0542   POCGLU 119 154* 144* 112         Blood Sugar Average: Last 72 hrs:  (P) 128  C/w Humalog AC  C/w ISS

## 2024-01-27 NOTE — ASSESSMENT & PLAN NOTE
ASA and Lipitor  LDL 80  Vascular consulted for bypass which will be done after the weekend  [place] [facility] [place]

## 2024-01-27 NOTE — CONSULTS
Podiatry - Consultation    Patient Information:   Lona Cedeno 78 y.o. female MRN: [id number]  Unit/Bed#: -01 Encounter: [id number]  PCP: Vivek Preston DO  Date of Admission:  1/26/2024  Date of Consultation: 01/27/24  Requesting Physician: Susy New MD      ASSESSMENT:    Lona Cedeno is a 78 y.o. female with:    Osteomyelitis of right second toe  Right posterior ankle ulcer with exposed achilles tendon  Right medial ankle ulcer, limited to breakdown of skin  T2DM  PAD  Tobacco use disorder  Diabetic polyneuropathy    PLAN:    Multiple stable right lower extremity wounds including medial ankle and posterior ankle with exposed Achilles tendon, no acute clinical signs of infection.  Plan to continue with local wound care at this time pending vascular surgery intervention recommendations.  Follow-up podiatry plan after vascular surgery intervention, patient is at risk for proximal amputation given extent of wounds. If patient is unable to be vascularly optimized, may further discuss proximal amputation vs. Palliative care  Reviewed RLE LEADs and vascular surgery recommendations: plan to attempt revascularization, likely undergo intervention until early next week  Reviewed right foot MRI from 1/24, agree with read: second distal phalanx exhibits increased T2 signal with T1 replacement signal concerning for osteomyelitis.   Reviewed right rearfoot MRI from 1/24, agree with final read: low suspicion for osteomyelitis of medial malleolus, thinning of achilles tendon without evidence of tear or rupture  Local wound care consisting of adaptic, DSD. Wound care instructions placed.  Elevation on green foam wedges or pillows when non-ambulatory  Rest of care per primary team  Will discuss this plan with my attending and update as needed.    Weightbearing status: Nonweightbearing to right lower extremity    SUBJECTIVE:    History of Present Illness:    Lona Cedeno is a 78 y.o. female who is originally  admitted 2024 due to vascular workup and intervention, which required her transfer from [facility]. Patient has a past medical history of T2DM, PAD, HTN, anxiety, GERD, left renal mass, COPD.    We are consulted for continued care as patient was previously evaluated by podiatry prior to transfer to [facility].  Noted to have osteomyelitis of the right second toe, a posterior right ankle ulcer with exposed Achilles tendon, and right medial ankle ulcer.  Patient is vascularly compromised, so she was transferred to [facility] for further vascular workup and intervention.  The patient states that she thinks that she has had these wounds on her right lower leg for about 1 month in duration, says that she has been following outpatient with wound care and was advised that she go to the hospital given the extent of her wounds.  She denies any pain in her right lower extremity at this time    Patient denies nausea, vomiting, shortness of breath, chest pain, fevers, chills    Review of Systems:    Constitutional: Negative.    HENT: Negative.    Eyes: Negative.    Respiratory: Negative.    Cardiovascular: Negative.    Gastrointestinal: Negative.    Musculoskeletal: negative   Skin: right lower extremity wounds   Neurological: occasional numbness in feet bilaterally   Psych: Negative.     Past Medical and Surgical History:     Past Medical History:   Diagnosis Date    Anxiety     Closed fracture of coccyx (HCC) 2023    Diabetes mellitus (HCC)     GERD (gastroesophageal reflux disease)     Hyperlipidemia     Hypertension     IBS (irritable bowel syndrome)     Shoulder fracture        Past Surgical History:   Procedure Laterality Date    ANKLE FRACTURE SURGERY Right     has screw in place     SECTION      x2    CHOLECYSTECTOMY      CYSTOSCOPY W/ LASER LITHOTRIPSY Left 2023    Procedure: CYSTOSCOPY; RETROGRADE; URETEROSCOPY; BIOPSY; LEFT -INSERTION OF STENT;  Surgeon: Cristian Pablo  MD Danyell;  Location: [facility];  Service: Urology    CYSTOSCOPY W/ LASER LITHOTRIPSY Left 11/20/2023    Procedure: CYSTOSCOPY; RETROGRADE; URETEROSCOPY; 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;  Surgeon: Cristian Child MD;  Location: [facility];  Service: Urology    FL RETROGRADE PYELOGRAM  9/18/2023    HERNIA REPAIR      umbilicus w/ mesh       Meds/Allergies:    Medications Prior to Admission   Medication    Aspirin (ASPIR-81 PO)    acetaminophen (TYLENOL) 325 mg tablet    albuterol (Ventolin HFA) 90 mcg/act inhaler    AMILoride 5 mg tablet    Apoaequorin (Prevagen) 10 MG CAPS    atorvastatin (LIPITOR) 10 mg tablet    Blood Glucose Monitoring Suppl (ONETOUCH VERIO) w/Device KIT    buPROPion (WELLBUTRIN XL) 300 mg 24 hr tablet    clonazePAM (KlonoPIN) 1 mg tablet    Fluticasone-Salmeterol (Advair Diskus) 250-50 mcg/dose inhaler    glucose blood (OneTouch Verio) test strip    Januvia 100 MG tablet    Lancets (OneTouch Delica Plus [id number]) MISC    lisinopril (ZESTRIL) 5 mg tablet    metFORMIN (GLUCOPHAGE-XR) 500 mg 24 hr tablet    Misc. Devices (Carex Coccyx Cushion) MISC    Omega-3 Fatty Acids (FISH OIL PO)    pantoprazole (PROTONIX) 40 mg tablet    potassium chloride (K-DUR,KLOR-CON) 10 mEq tablet    pregabalin (LYRICA) 200 MG capsule    solifenacin (VESICARE) 5 mg tablet       Allergies   Allergen Reactions    Paroxetine Other (See Comments)     Became suicidal    Adhesive [Medical Tape] Itching and Blisters    Carafate [Sucralfate] Other (See Comments)     Mouth sores, tongue sore    Sulfa Antibiotics Hives and Rash    Sulfamethoxazole-Trimethoprim Hives       Social History:     Marital Status: /Civil Union    Substance Use History:   Social History     Substance and Sexual Activity   Alcohol Use Not Currently     Social History     Tobacco Use   Smoking Status Every Day    Current packs/day: 1.00    Average packs/day: 1 pack/day for 63.1 years (63.1 ttl pk-yrs)    Types: Cigarettes     Start date: 1961   Smokeless Tobacco Never   Tobacco Comments    11/14/23 smokes about 3/4 PPD     Social History     Substance and Sexual Activity   Drug Use Never       Family History:    Family History   Problem Relation Age of Onset    COPD Mother     Emphysema Mother     COPD Father     Emphysema Father          OBJECTIVE:    Vitals:   Blood Pressure: 144/69 (01/27/24 0736)  Pulse: 80 (01/27/24 0736)  Temperature: (!) 97.4 °F (36.3 °C) (01/27/24 0736)  Respirations: 16 (01/27/24 0736)  SpO2: 96 % (01/27/24 0736)    Physical Exam:    General Appearance: Alert, cooperative, no distress.  HEENT: Head normocephalic, atraumatic, without obvious abnormality.  Heart: Normal rate and rhythm.  Lungs: Non-labored breathing. No respiratory distress.  Abdomen: Without distension.  Psychiatric: AAOx3  Lower Extremity:  Vascular:   Right DP and PT pulses are nonpalpable. Left DP and PT pulses are nonpalpable. CRT < 3 seconds at the digits. +0/4 edema noted at bilateral lower extremities. Pedal hair is absent. Skin temperature is cool bilaterally.    Musculoskeletal:  MMT is 5/5 in all muscle compartments bilaterally. ROM at the 1st MPJ and ankle joint are reduced bilaterally with the leg extended. No Pain on palpation of right lower extremity. No gross deformities noted.     Dermatological:  Lower extremity wound(s) as noted below:    Wound #: 1  Location: right medial ankle  Length 3cm: Width 2.5cm: Depth 0.2cm:   Deepest Tissue Noted in Base: subcutaneous  Probe to Bone: No  Peripheral Skin Description: Attached  Granulation: 60% Fibrotic Tissue: 40% Necrotic Tissue: 0%   Drainage Amount: minimal, serous  Signs of Infection: No      Wound #: 2  Location: right posterior ankle, exposed achilles tendon  Length 5.5cm: Width 3cm: Depth 0.4cm:   Deepest Tissue Noted in Base: tendon  Probe to Bone: Yes  Peripheral Skin Description: Attached  Granulation: 0% Fibrotic Tissue: 50% Necrotic Tissue: 50%   Drainage Amount: minimal,  "serous  Signs of Infection: No      Right second digit: post ulcerative lesion noted to distal tuft of digit with mild surrounding erythema and edema, no purulence, no malodor, no ascending erythema, no crepitus, no fluctuance.      Neurological:  Gross sensation is intact. Protective sensation is diminished. Patient Reports numbness and/or paresthesias.    Clinical Images 01/27/24:                            Additional data:     Lab Results: I have personally reviewed pertinent labs including:    Results from last 7 days   Lab Units 01/27/24  0529   WBC Thousand/uL 7.95   HEMOGLOBIN g/dL 13.8   HEMATOCRIT % 43.6   PLATELETS Thousands/uL 349   NEUTROS PCT % 66   LYMPHS PCT % 21   MONOS PCT % 7   EOS PCT % 6     Results from last 7 days   Lab Units 01/27/24  0529 01/24/24  0505 01/23/24 1940   POTASSIUM mmol/L 3.7   < > 4.5   CHLORIDE mmol/L 103   < > 99   CO2 mmol/L 30   < > 25   BUN mg/dL 8   < > 12   CREATININE mg/dL 0.51*   < > 0.54*   CALCIUM mg/dL 10.0   < > 10.3*   ALK PHOS U/L  --   --  64   ALT U/L  --   --  7   AST U/L  --   --  10*    < > = values in this interval not displayed.     Results from last 7 days   Lab Units 01/23/24  1940   INR  1.03       Cultures: I have personally reviewed pertinent cultures including:    Results from last 7 days   Lab Units 01/23/24 1952 01/23/24 1945 01/23/24  1940   BLOOD CULTURE  No Growth at 72 hrs.  --  No Growth at 72 hrs.   GRAM STAIN RESULT   --  No Polys or Bacteria seen  --    WOUND CULTURE   --  1+ Growth of  --            Imaging: I have personally reviewed pertinent reports in PACS.  EKG, Pathology, and Other Studies: I have personally reviewed pertinent reports.        ** Please Note: Portions of the record may have been created with voice recognition software. Occasional wrong word or \"sound a like\" substitutions may have occurred due to the inherent limitations of voice recognition software. Read the chart carefully and recognize, using context, where " substitutions have occurred. **

## 2024-01-27 NOTE — ASSESSMENT & PLAN NOTE
ASA and Lipitor  LDL 80  Vascular consulted for bypass which will be done after the weekend  [place] [facility] [place]

## 2024-01-27 NOTE — UTILIZATION REVIEW
NOTIFICATION OF INPATIENT ADMISSION   AUTHORIZATION REQUEST   SERVICING FACILITY:   [facility]  Address: [address] [place] [address]  Tax ID: [id number]  NPI: [id number] ATTENDING PROVIDER:  Attending Name and NPI#: Susy New Md [[id number]]  Address: [address] [place] [address]  Phone: [phone]   ADMISSION INFORMATION:  Place of Service: Inpatient [facility]  Place of Service Code: 21  Inpatient Admission Date/Time: 1/26/24  8:47 PM  Discharge Date/Time: No discharge date for patient encounter.  Admitting Diagnosis Code/Description:  Diabetic foot ulcer with osteomyelitis (HCC) [E11.621, E11.69, L97.509, M86.9]     UTILIZATION REVIEW CONTACT:  Mehid Morrison Utilization   Network Utilization Review Department  Phone: [phone]  Fax: [phone]  Email: [email or web address]  Contact for approvals/pending authorizations, clinical reviews, and discharge.     PHYSICIAN ADVISORY SERVICES:  Medical Necessity Denial & Naug-kg-Enji Review  Phone: [phone]  Fax: [phone]  Email: [email or web address]     DISCHARGE SUPPORT TEAM:  For Patients Discharge Needs & Updates  Phone: [phone] opt. 2 Fax: [phone]  Email: [email or web address]

## 2024-01-27 NOTE — PHYSICAL THERAPY NOTE
PHYSICAL THERAPY EVALUATION  NAME:  Lona Cedeno  DATE: 24    AGE:   78 y.o.  Mrn:   [id number]  ADMIT DX:  Diabetic foot ulcer with osteomyelitis (HCC) [E11.621, E11.69, L97.509, M86.9]  Problem List:   Patient Active Problem List   Diagnosis    Type 2 diabetes mellitus with diabetic polyneuropathy (HCC)    Essential hypertension    Anxiety and depression    Medicare annual wellness visit, subsequent    GERD without esophagitis    Urinary incontinence    Degenerative lumbar disc    Lactic acidosis    Hypomagnesemia    Bladder neoplasm    Left renal mass    PAD (peripheral artery disease) ([facility])    Abnormal CT of the chest    COPD, severity to be determined ([facility])    Tobacco use disorder    Age-related osteoporosis without current pathological fracture    Ankle ulcer, right, with fat layer exposed ([facility])    Diabetic foot ulcer with osteomyelitis ([facility])    Preoperative cardiovascular examination    Constipation       Past Medical History  Past Medical History:   Diagnosis Date    Anxiety     Closed fracture of coccyx (HCC) 2023    Diabetes mellitus (HCC)     GERD (gastroesophageal reflux disease)     Hyperlipidemia     Hypertension     IBS (irritable bowel syndrome)     Shoulder fracture        Past Surgical History  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;  Surgeon: Cristian Child MD;  Location: [facility];  Service: Urology    FL RETROGRADE PYELOGRAM  2023    HERNIA REPAIR      umbilicus w/ mesh       Length Of Stay:  1  Performed at least 2 patient identifiers during session: Name and ID bracelet         01/27/24 0932   PT Last Visit   PT Visit Date 01/27/24   Note Type   Note type Evaluation   Pain Assessment   Pain Assessment Tool 0-10   Pain Score No Pain   Restrictions/Precautions   Weight Bearing Precautions Per Order Yes   RLE Weight Bearing Per Order NWB   Braces or Orthoses   (none reported)   Other Precautions WBS;Fall Risk;Hard of hearing   Home Living   Type of Home House   Home Layout One level;Stairs to enter with rails  (5+5 PATO)   Bathroom Shower/Tub Walk-in shower   Bathroom Toilet Raised   Bathroom Equipment Grab bars in shower;Shower chair   Home Equipment   (no AD used at baseline)   Prior Function   Level of [place]ukes Independent with ADLs;Independent with functional mobility;Independent with IADLS   Lives With Spouse;Daughter  (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 and daughter with a cognitve impairment)   Receives Help From Family   IADLs Independent with driving;Independent with meal prep;Independent with medication management   Falls in the last 6 months 0   Vocational Retired   General   Family/Caregiver Present No   Cognition   Overall Cognitive Status WFL   Arousal/Participation Responsive   Orientation Level Oriented X4   Memory Within functional limits   Following Commands Follows all commands and directions without difficulty   RLE Assessment   RLE Assessment WFL   LLE Assessment   LLE Assessment WFL   Vision-Basic Assessment   Current Vision Wears glasses all the time   Coordination   Sensation X   Light Touch   RLE Light Touch Impaired   LLE Light Touch Impaired   Bed Mobility   Supine to Sit 5  Supervision   Additional items Assist x 1;Increased time required;HOB elevated   Sit to Supine 5  Supervision   Additional items Assist x 1;Verbal cues;Increased time required   Additional Comments pt denied dizziness with transitional movement   Transfers   Sit to Stand 4  Minimal assistance    Additional items Assist x 1;Verbal cues;Increased time required   Stand to Sit 4  Minimal assistance   Additional items Assist x 1;Armrests;Increased time required;Verbal cues   Additional Comments pt required cues for proper hand placement; demonstrated performed on how to maintain WBS   Ambulation/Elevation   Gait pattern   (hops on LLE; difficulty maintaining WBS on 1st step but able to maintain WBS post 1st step)   Gait Assistance 4  Minimal assist   Additional items Assist x 1;Verbal cues   Assistive Device Rolling walker   Distance 4 ft   Balance   Static Sitting Good   Dynamic Sitting Fair +   Static Standing Fair -   Dynamic Standing Poor +   Ambulatory Poor +   Endurance Deficit   Endurance Deficit Yes   Endurance Deficit Description pt reported fatigue with activity   Activity Tolerance   Activity Tolerance Patient limited by fatigue   Assessment   Prognosis Fair   Assessment Pt is 78 y.o. female seen for PT evaluation s/p admit to [facility] on 1/26/2024 w/ Diabetic foot ulcer with osteomyelitis (HCC). PT consulted to assess pt's functional mobility and d/c needs. Order placed for PT eval and tx, w/ NWB RLE order. Pt agreeable to PT  session upon arrival, pt found supine in bed.  PTA, pt was independent w/ all functional mobility w/ no DA, ambulates unrestricted distances and all terrain, has 10 PATO, lives w/ family in 1 level home, and retired.  Pt to benefit from continued PT tx to address deficits and maximize level of functional independent mobility and consistency. Upon conclusion pt  seated in recliner. Complexity: Comorbidities affecting pt's physical performance at time of assessment include: COPD, DM, anxiety, and PAD . Personal factors affecting pt at time of IE include: active prior to admission, limited mobility, ambulating with assistive device, steps to enter home, inability to ambulate household distances, anxiety, and unable to perform caregiver tasks. Please find objective  findings from PT assessment regarding body systems outlined above with impairments and limitations including impaired balance, decreased endurance, gait deviations, decreased activity tolerance, decreased functional mobility tolerance, altered sensation, fall risk, and decreased skin integrity.  Pt's clinical presentation is currently unstable/unpredictable seen in pt's presentation of abnormal blood sugar levels, wounds, and pending procedures. The patient's [facility] Basic Mobility Inpatient Short Form Raw Score is 17.  Based on patient presentations and impairments, pt would most appropriately benefit from Level 2 resource intensity upon discharge. Please also refer to the recommendation of the Physical Therapist for safe discharge planning. RN verbalized pt appropriate for PT session.   Barriers to Discharge Inaccessible home environment  (pt unsure of how she will be able to perform steps to get into the home)   Goals   Patient Goals to get better   LTG Expiration Date 02/06/24   Long Term Goal #1 Pt will: Perform bed mobility tasks to modified I to improve ease of bed mobility. Perform transfers to modified I to improve ease of transfers. Perform ambulation with MI and RW for 250 feet to increase Indep in home environment. Increase dynamic standing balance to F+ to decrease fall risk. Increase OOB activity tolerance to 10 minutes without s/s of exertion to decrease fall risk. Navigate up and down 10 steps with MI so patient can enter and exit home.   Plan   Treatment/Interventions Functional transfer training;Therapeutic exercise;Endurance training;Gait training;Bed mobility;Equipment eval/education;Elevations   PT Frequency 3-5x/wk   Discharge Recommendation   Rehab Resource Intensity Level, PT II (Moderate Resource Intensity)   Equipment Recommended Walker   Walker Package Recommended Wheeled walker   [facility] Basic Mobility Inpatient   Turning in Flat Bed Without Bedrails 4   Lying on Back to Sitting on Edge of  Flat Bed Without Bedrails 4   Moving Bed to Chair 3   Standing Up From Chair Using Arms 3   Walk in Room 2   Climb 3-5 Stairs With Railing 1   Basic Mobility Inpatient Raw Score 17   Basic Mobility Standardized Score 39.67   Highest Level Of Mobility   -HLM Goal 5: Stand one or more mins   JH-HLM Achieved 4: Move to chair/commode   Additional Treatment Session   Start Time 0921   End Time 0932   Treatment Assessment Therex to increase activity tolerance to improve functional mobility and eudcate pt on proper exercises for current medical condition   Exercises   Glute Sets 15 reps;AROM;Bilateral;Sitting   Hip Flexion 15 reps;AROM;Bilateral;Sitting   Hip Adduction 15 reps;AROM;Bilateral;Sitting   Knee AROM Long Arc Quad 15 reps;AROM;Bilateral;Sitting   Ankle Pumps Sitting;15 reps;AROM;Left     Time In: 0905  Time Out: 0920   Total Evaluation Minutes: 15    Thalia Cassidy, PT

## 2024-01-28 ENCOUNTER — APPOINTMENT (INPATIENT)
Dept: NON INVASIVE DIAGNOSTICS | Facility: HOSPITAL | Age: 79
DRG: 253 | End: 2024-01-28
Payer: COMMERCIAL

## 2024-01-28 LAB
ANION GAP SERPL CALCULATED.3IONS-SCNC: 8 MMOL/L
BACTERIA BLD CULT: NORMAL
BACTERIA BLD CULT: NORMAL
BASOPHILS # BLD AUTO: 0.04 THOUSANDS/ÂΜL (ref 0–0.1)
BASOPHILS NFR BLD AUTO: 1 % (ref 0–1)
BUN SERPL-MCNC: 10 MG/DL (ref 5–25)
CALCIUM SERPL-MCNC: 9.7 MG/DL (ref 8.4–10.2)
CHLORIDE SERPL-SCNC: 105 MMOL/L (ref 96–108)
CO2 SERPL-SCNC: 28 MMOL/L (ref 21–32)
CREAT SERPL-MCNC: 0.42 MG/DL (ref 0.6–1.3)
EOSINOPHIL # BLD AUTO: 0.47 THOUSAND/ÂΜL (ref 0–0.61)
EOSINOPHIL NFR BLD AUTO: 6 % (ref 0–6)
ERYTHROCYTE [DISTWIDTH] IN BLOOD BY AUTOMATED COUNT: 13 % (ref 11.6–15.1)
GFR SERPL CREATININE-BSD FRML MDRD: 99 ML/MIN/1.73SQ M
GLUCOSE SERPL-MCNC: 127 MG/DL (ref 65–140)
GLUCOSE SERPL-MCNC: 133 MG/DL (ref 65–140)
GLUCOSE SERPL-MCNC: 151 MG/DL (ref 65–140)
GLUCOSE SERPL-MCNC: 193 MG/DL (ref 65–140)
GLUCOSE SERPL-MCNC: 72 MG/DL (ref 65–140)
HCT VFR BLD AUTO: 40.8 % (ref 34.8–46.1)
HGB BLD-MCNC: 13.2 G/DL (ref 11.5–15.4)
IMM GRANULOCYTES # BLD AUTO: 0.01 THOUSAND/UL (ref 0–0.2)
IMM GRANULOCYTES NFR BLD AUTO: 0 % (ref 0–2)
LYMPHOCYTES # BLD AUTO: 1.76 THOUSANDS/ÂΜL (ref 0.6–4.47)
LYMPHOCYTES NFR BLD AUTO: 22 % (ref 14–44)
MCH RBC QN AUTO: 31.1 PG (ref 26.8–34.3)
MCHC RBC AUTO-ENTMCNC: 32.4 G/DL (ref 31.4–37.4)
MCV RBC AUTO: 96 FL (ref 82–98)
MONOCYTES # BLD AUTO: 0.55 THOUSAND/ÂΜL (ref 0.17–1.22)
MONOCYTES NFR BLD AUTO: 7 % (ref 4–12)
NEUTROPHILS # BLD AUTO: 5.22 THOUSANDS/ÂΜL (ref 1.85–7.62)
NEUTS SEG NFR BLD AUTO: 64 % (ref 43–75)
NRBC BLD AUTO-RTO: 0 /100 WBCS
PLATELET # BLD AUTO: 332 THOUSANDS/UL (ref 149–390)
PMV BLD AUTO: 9.2 FL (ref 8.9–12.7)
POTASSIUM SERPL-SCNC: 3.9 MMOL/L (ref 3.5–5.3)
RBC # BLD AUTO: 4.25 MILLION/UL (ref 3.81–5.12)
SODIUM SERPL-SCNC: 141 MMOL/L (ref 135–147)
WBC # BLD AUTO: 8.05 THOUSAND/UL (ref 4.31–10.16)

## 2024-01-28 PROCEDURE — 80048 BASIC METABOLIC PNL TOTAL CA: CPT | Performed by: PHYSICIAN ASSISTANT

## 2024-01-28 PROCEDURE — 85025 COMPLETE CBC W/AUTO DIFF WBC: CPT | Performed by: PHYSICIAN ASSISTANT

## 2024-01-28 PROCEDURE — 99232 SBSQ HOSP IP/OBS MODERATE 35: CPT | Performed by: PHYSICIAN ASSISTANT

## 2024-01-28 PROCEDURE — 93930 UPPER EXTREMITY STUDY: CPT

## 2024-01-28 PROCEDURE — 82948 REAGENT STRIP/BLOOD GLUCOSE: CPT

## 2024-01-28 RX ADMIN — ENOXAPARIN SODIUM 40 MG: 40 INJECTION SUBCUTANEOUS at 09:43

## 2024-01-28 RX ADMIN — INSULIN LISPRO 5 UNITS: 100 INJECTION, SOLUTION INTRAVENOUS; SUBCUTANEOUS at 09:43

## 2024-01-28 RX ADMIN — DOCUSATE SODIUM 100 MG: 100 CAPSULE, LIQUID FILLED ORAL at 17:13

## 2024-01-28 RX ADMIN — ATORVASTATIN CALCIUM 10 MG: 10 TABLET, FILM COATED ORAL at 17:13

## 2024-01-28 RX ADMIN — BUPROPION HYDROCHLORIDE 300 MG: 150 TABLET, FILM COATED, EXTENDED RELEASE ORAL at 09:44

## 2024-01-28 RX ADMIN — NICOTINE 1 PATCH: 21 PATCH, EXTENDED RELEASE TRANSDERMAL at 09:42

## 2024-01-28 RX ADMIN — DOCUSATE SODIUM 100 MG: 100 CAPSULE, LIQUID FILLED ORAL at 09:44

## 2024-01-28 RX ADMIN — PREGABALIN 200 MG: 100 CAPSULE ORAL at 21:26

## 2024-01-28 RX ADMIN — INSULIN LISPRO 1 UNITS: 100 INJECTION, SOLUTION INTRAVENOUS; SUBCUTANEOUS at 12:27

## 2024-01-28 RX ADMIN — FLUTICASONE FUROATE AND VILANTEROL TRIFENATATE 1 PUFF: 200; 25 POWDER RESPIRATORY (INHALATION) at 09:42

## 2024-01-28 RX ADMIN — PANTOPRAZOLE SODIUM 40 MG: 40 TABLET, DELAYED RELEASE ORAL at 09:44

## 2024-01-28 RX ADMIN — CLONAZEPAM 1 MG: 1 TABLET ORAL at 17:13

## 2024-01-28 RX ADMIN — OXYBUTYNIN CHLORIDE 5 MG: 5 TABLET, EXTENDED RELEASE ORAL at 09:44

## 2024-01-28 RX ADMIN — PREGABALIN 200 MG: 100 CAPSULE ORAL at 17:13

## 2024-01-28 RX ADMIN — OMEGA-3 FATTY ACIDS CAP 1000 MG 1000 MG: 1000 CAP at 09:44

## 2024-01-28 RX ADMIN — AMILORIDE HYDROCLORIDE 5 MG: 5 TABLET ORAL at 09:43

## 2024-01-28 RX ADMIN — SENNOSIDES 8.6 MG: 8.6 TABLET, FILM COATED ORAL at 21:26

## 2024-01-28 RX ADMIN — LISINOPRIL 5 MG: 5 TABLET ORAL at 09:44

## 2024-01-28 RX ADMIN — PREGABALIN 200 MG: 100 CAPSULE ORAL at 09:44

## 2024-01-28 RX ADMIN — INSULIN LISPRO 5 UNITS: 100 INJECTION, SOLUTION INTRAVENOUS; SUBCUTANEOUS at 12:27

## 2024-01-28 RX ADMIN — ASPIRIN 81 MG: 81 TABLET, COATED ORAL at 09:44

## 2024-01-28 RX ADMIN — INSULIN LISPRO 1 UNITS: 100 INJECTION, SOLUTION INTRAVENOUS; SUBCUTANEOUS at 21:26

## 2024-01-28 NOTE — PLAN OF CARE
Problem: SAFETY ADULT  Goal: Patient will remain free of falls  Description: INTERVENTIONS:  - Educate patient/family on patient safety including physical limitations  - Instruct patient to call for assistance with activity   - Consult OT/PT to assist with strengthening/mobility   - Keep Call bell within reach  - Keep bed low and locked with side rails adjusted as appropriate  - Keep care items and personal belongings within reach  - Initiate and maintain comfort rounds  - Make Fall Risk Sign visible to staff  - Offer Toileting every  Hours, in advance of need  - Initiate/Maintain alarm  - Obtain necessary fall risk management equipment:   - Apply yellow socks and bracelet for high fall risk patients  - Consider moving patient to room near nurses station  Outcome: Progressing

## 2024-01-28 NOTE — ASSESSMENT & PLAN NOTE
ASA and Lipitor  LDL 80  Vascular consulted for bypass which will be done after the weekend  [place] [facility] [place]

## 2024-01-28 NOTE — ASSESSMENT & PLAN NOTE
Lab Results   Component Value Date    HGBA1C 6.5 (H) 11/07/2023       Recent Labs     01/27/24  1044 01/27/24  1637 01/27/24  2119 01/28/24  0641   POCGLU 146* 76 190* 133         Blood Sugar Average: Last 72 hrs:  (P) 133.5  Will need bypass w/ vascular before podiatry takes to OR  Cardio provided clearance for any procedures needed  RLE NWB  [place] [facility] watch off abx

## 2024-01-28 NOTE — CASE MANAGEMENT
Case Management Discharge Planning Note    Patient name Lona Cedeno  Location /-01 MRN [id number]  : 1945 Date 2024       Current Admission Date: 2024  Current Admission Diagnosis:Diabetic foot ulcer with osteomyelitis (HCC)   Patient Active Problem List    Diagnosis Date Noted    Constipation 2024    Preoperative cardiovascular examination 2024    Diabetic foot ulcer with osteomyelitis (HCC) 2024    Ankle ulcer, right, with fat layer exposed (HCC) 2023    Age-related osteoporosis without current pathological fracture 2023    Abnormal CT of the chest 10/17/2023    COPD, severity to be determined ([facility]) 10/17/2023    Tobacco use disorder 10/17/2023    PAD (peripheral artery disease) ([facility]) 10/10/2023    Bladder neoplasm 2023    Left renal mass 2023    Lactic acidosis 2023    Hypomagnesemia 2023    Degenerative lumbar disc 2023    Urinary incontinence 2023    GERD without esophagitis 10/16/2019    Medicare annual wellness visit, subsequent 2019    Essential hypertension 02/15/2019    Anxiety and depression 02/15/2019    Type 2 diabetes mellitus with diabetic polyneuropathy (HCC) 2019      LOS (days): 2  Geometric Mean LOS (GMLOS) (days):   Days to GMLOS:     OBJECTIVE:  Risk of Unplanned Readmission Score: 15.26         Current admission status: Inpatient   Preferred Pharmacy:   [id number] [address] [address]  Phone: [phone] Fax: [phone]    [facility] [place] - [address]  [address] [facility] [place] [address]  Phone: [phone] Fax: [phone]    Primary Care Provider: Vivek Preston DO    Primary Insurance: [facility]  Secondary Insurance:     DISCHARGE DETAILS:    Discharge planning discussed with:: pt at bedside  [place] of Choice: Yes     CM contacted family/caregiver?: No- see  comments  Were Treatment Team discharge recommendations reviewed with patient/caregiver?: Yes  Did patient/caregiver verbalize understanding of patient care needs?: Yes  Were patient/caregiver advised of the risks associated with not following Treatment Team discharge recommendations?: Yes    Contacts  Patient Contacts: Ariela Feng  Relationship to Patient:: Family  Contact Method: Phone  Phone Number: [phone]  Reason/Outcome: Continuity of Care, Emergency Contact, Discharge Planning    Requested Home Health Care         Is the patient interested in HHC at discharge?: Yes  Home Health Discipline requested:: Occupational Therapy, Physical Therapy  Home Health Follow-Up Provider:: PCP  Home Health Services Needed:: Strengthening/Theraputic Exercises to Improve Function, Gait/ADL Training, Evaluate Functional Status and Safety  Homebound Criteria Met:: Requires the Assistance of Another Person for Safe Ambulation or to Leave the Home, Uses an Assist Device (i.e. cane, walker, etc)  Supporting Clincal Findings:: Fatigues Easliy in Short Distances, Limited Endurance    DME Referral Provided  Referral made for DME?: No    Other Referral/Resources/Interventions Provided:  Interventions: HHC  Referral Comments: [place] referrals submitted for HHC via [employer]    Would you like to participate in our [facility] service program?  : No - Declined    Treatment Team Recommendation: Home with Home Health Care  Discharge Destination Plan:: Home with Home Health Care  Transport at Discharge : Family                                      Additional Comments: CM introduced herself and role to pt at bedside. Assessment completed for pt on 1/24/24. Pt stated she is independent at baseline with ADLS/IADLS. 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 Pt stated she drives. Pt stated she owns a walker and cane. Pt sated once medically cleared and ready for  discharge her daughter Ariela will be able to transport. CM discussed discharge recommendation with pt at bedside. Pt stated she is agreeable to PT/OT in the home, due to her to being the caregiver for her spouse and daughter. [place] referrals submitted for C via aidin. [facility] is pt first choice . CM will continue to follow as needed.

## 2024-01-28 NOTE — PROGRESS NOTES
[facility]  Progress Note  Name: Lona Cedeno I  MRN: [id number]  Unit/Bed#: -01 I Date of Admission: 1/26/2024   Date of Service: 1/28/2024 I Hospital Day: 2    Assessment/Plan   * Diabetic foot ulcer with osteomyelitis (HCC)  Assessment & Plan  Lab Results   Component Value Date    HGBA1C 6.5 (H) 11/07/2023       Recent Labs     01/27/24  1044 01/27/24  1637 01/27/24 2119 01/28/24  0641   POCGLU 146* 76 190* 133         Blood Sugar Average: Last 72 hrs:  (P) 133.5  Will need bypass w/ vascular before podiatry takes to OR  Cardio provided clearance for any procedures needed  RLE NWB  ID eval at [facility] and rec to watch off abx    Constipation  Assessment & Plan  Add colace/senna    Tobacco use disorder  Assessment & Plan  Nicotine TD    PAD (peripheral artery disease) ([facility])  Assessment & Plan  ASA and Lipitor  LDL 80  Vascular consulted for bypass which will be done after the weekend  Cardio at [facility] cleared for bypass    Type 2 diabetes mellitus with diabetic polyneuropathy ([facility])  Assessment & Plan  Lab Results   Component Value Date    HGBA1C 6.5 (H) 11/07/2023       Recent Labs     01/27/24  1044 01/27/24  1637 01/27/24 2119 01/28/24  0641   POCGLU 146* 76 190* 133         Blood Sugar Average: Last 72 hrs:  (P) 133.5  C/w Humalog AC  C/w ISS           VTE Pharmacologic Prophylaxis: VTE Score: 4 Moderate Risk (Score 3-4) - Pharmacological DVT Prophylaxis Ordered: enoxaparin (Lovenox).    Mobility:   Basic Mobility Inpatient Raw Score: 20  JH-HLM Goal: 6: Walk 10 steps or more  JH-HLM Achieved: 6: Walk 10 steps or more  HLM Goal achieved. Continue to encourage appropriate mobility.    Patient Centered Rounds: I performed bedside rounds with nursing staff today.   Discussions with Specialists or Other Care Team Provider:     Education and Discussions with Family / Patient: Patient declined call to .     Total Time Spent on Date of Encounter in care of patient:  20 mins. This time was spent on one or more of the following: performing physical exam; counseling and coordination of care; obtaining or reviewing history; documenting in the medical record; reviewing/ordering tests, medications or procedures; communicating with other healthcare professionals and discussing with patient's family/caregivers.    Current Length of Stay: 2 day(s)  Current Patient Status: Inpatient   Certification Statement: The patient will continue to require additional inpatient hospital stay due to RLE OM pending vascular and podiatry intervention   Discharge Plan: Anticipate discharge in 48-72 hrs to home.    Code Status: Level 2 - DNAR: but accepts endotracheal intubation    Subjective:   No acute complaints.  Feels fine.  Pain controlled.      Objective:     Vitals:   Temp (24hrs), Av.6 °F (36.4 °C), Min:97.3 °F (36.3 °C), Max:98.1 °F (36.7 °C)    Temp:  [97.3 °F (36.3 °C)-98.1 °F (36.7 °C)] 98.1 °F (36.7 °C)  HR:  [67-78] 78  Resp:  [16] 16  BP: (136-145)/(65-74) 145/74  SpO2:  [88 %-95 %] 88 %  There is no height or weight on file to calculate BMI.     Input and Output Summary (last 24 hours):     Intake/Output Summary (Last 24 hours) at 2024 1025  Last data filed at 2024 0900  Gross per 24 hour   Intake --   Output 550 ml   Net -550 ml       Physical Exam:   Physical Exam  Vitals reviewed.   Constitutional:       General: She is not in acute distress.     Appearance: She is not toxic-appearing.   HENT:      Head: Normocephalic and atraumatic.   Eyes:      Extraocular Movements: Extraocular movements intact.   Pulmonary:      Effort: Pulmonary effort is normal. No respiratory distress.   Musculoskeletal:         General: Normal range of motion.   Neurological:      General: No focal deficit present.      Mental Status: She is alert and oriented to person, place, and time.   Psychiatric:         Mood and Affect: Mood normal.         Behavior: Behavior normal.         Thought  Content: Thought content normal.         Additional Data:     Labs:  Results from last 7 days   Lab Units 01/28/24  0619   WBC Thousand/uL 8.05   HEMOGLOBIN g/dL 13.2   HEMATOCRIT % 40.8   PLATELETS Thousands/uL 332   NEUTROS PCT % 64   LYMPHS PCT % 22   MONOS PCT % 7   EOS PCT % 6     Results from last 7 days   Lab Units 01/28/24  0619 01/24/24  0505 01/23/24  1940   SODIUM mmol/L 141   < > 135   POTASSIUM mmol/L 3.9   < > 4.5   CHLORIDE mmol/L 105   < > 99   CO2 mmol/L 28   < > 25   BUN mg/dL 10   < > 12   CREATININE mg/dL 0.42*   < > 0.54*   ANION GAP mmol/L 8   < > 11   CALCIUM mg/dL 9.7   < > 10.3*   ALBUMIN g/dL  --   --  4.2   TOTAL BILIRUBIN mg/dL  --   --  0.21   ALK PHOS U/L  --   --  64   ALT U/L  --   --  7   AST U/L  --   --  10*   GLUCOSE RANDOM mg/dL 127   < > 120    < > = values in this interval not displayed.     Results from last 7 days   Lab Units 01/23/24  1940   INR  1.03     Results from last 7 days   Lab Units 01/28/24  0641 01/27/24  2119 01/27/24  1637 01/27/24  1044 01/27/24  0542 01/26/24  2140 01/26/24  1609 01/26/24  1119 01/26/24  0712 01/25/24  2032 01/25/24  1610 01/25/24  1123   POC GLUCOSE mg/dl 133 190* 76 146* 112 144* 154* 119 117 125 102 133         Results from last 7 days   Lab Units 01/24/24  0505 01/23/24  2223 01/23/24  1940   LACTIC ACID mmol/L  --  1.7 3.3*   PROCALCITONIN ng/ml <0.05  --  <0.05       Lines/Drains:  Invasive Devices       Peripheral Intravenous Line  Duration             Peripheral IV 01/25/24 Distal;Dorsal (posterior);Right Forearm 2 days              Drain  Duration             Ureteral Internal Stent Left ureter 6 Fr. 69 days                          Imaging: No pertinent imaging reviewed.    Recent Cultures (last 7 days):   Results from last 7 days   Lab Units 01/23/24 1952 01/23/24 1945 01/23/24 1940   BLOOD CULTURE  No Growth After 4 Days.  --  No Growth After 4 Days.   GRAM STAIN RESULT   --  No Polys or Bacteria seen  --    WOUND CULTURE   --   1+ Growth of  --        Last 24 Hours Medication List:   Current Facility-Administered Medications   Medication Dose Route Frequency Provider Last Rate    acetaminophen  650 mg Oral Q4H PRN Cristian Cerna, DO      albuterol  2 puff Inhalation Q6H PRN Cristian Cerna, DO      AMILoride  5 mg Oral Daily Cristian Cerna, DO      aspirin  81 mg Oral Daily Cristian Cerna, DO      atorvastatin  10 mg Oral Daily With Dinner Cristian Cerna, DO      buPROPion  300 mg Oral Daily Cristian Cerna, DO      clonazePAM  1 mg Oral QPM Cristian Cerna, DO      docusate sodium  100 mg Oral BID Cristian Cerna, DO      enoxaparin  40 mg Subcutaneous Daily Cristian Cerna, DO      fish oil  1,000 mg Oral Daily Cristian Cerna, DO      fluticasone-vilanterol  1 puff Inhalation Daily Cristian Cerna, DO      insulin lispro  1-5 Units Subcutaneous TID AC Cristian Cerna, DO      insulin lispro  1-5 Units Subcutaneous HS Cristian Cerna, DO      insulin lispro  5 Units Subcutaneous TID With Meals Cristian Cerna,       lisinopril  5 mg Oral Daily Cristian Cerna, DO      nicotine  1 patch Transdermal Daily Cristian Cerna,       ondansetron  4 mg Intravenous Q6H PRN Cristian Cerna, DO      oxybutynin  5 mg Oral Daily Cristian Cerna, DO      pantoprazole  40 mg Oral Early Morning Cristian Cerna, DO      pregabalin  200 mg Oral TID Cristian Cerna, DO      senna  1 tablet Oral HS Cristian Cerna, DO          Today, Patient Was Seen By: Penny Herr PA-C    **Please Note: This note may have been constructed using a voice recognition system.**

## 2024-01-28 NOTE — ASSESSMENT & PLAN NOTE
Lab Results   Component Value Date    HGBA1C 6.5 (H) 11/07/2023       Recent Labs     01/27/24  1044 01/27/24  1637 01/27/24  2119 01/28/24  0641   POCGLU 146* 76 190* 133         Blood Sugar Average: Last 72 hrs:  (P) 133.5  C/w Humalog AC  C/w ISS

## 2024-01-29 ENCOUNTER — TRANSITIONAL CARE MANAGEMENT (OUTPATIENT)
Dept: FAMILY MEDICINE CLINIC | Facility: CLINIC | Age: 79
End: 2024-01-29

## 2024-01-29 LAB
ABO GROUP BLD: NORMAL
ABO GROUP BLD: NORMAL
BACTERIA BLD CULT: NORMAL
BACTERIA BLD CULT: NORMAL
BLD GP AB SCN SERPL QL: NEGATIVE
GLUCOSE SERPL-MCNC: 121 MG/DL (ref 65–140)
GLUCOSE SERPL-MCNC: 152 MG/DL (ref 65–140)
GLUCOSE SERPL-MCNC: 153 MG/DL (ref 65–140)
GLUCOSE SERPL-MCNC: 84 MG/DL (ref 65–140)
RH BLD: POSITIVE
RH BLD: POSITIVE
SPECIMEN EXPIRATION DATE: NORMAL

## 2024-01-29 PROCEDURE — 86850 RBC ANTIBODY SCREEN: CPT | Performed by: STUDENT IN AN ORGANIZED HEALTH CARE EDUCATION/TRAINING PROGRAM

## 2024-01-29 PROCEDURE — 86900 BLOOD TYPING SEROLOGIC ABO: CPT | Performed by: STUDENT IN AN ORGANIZED HEALTH CARE EDUCATION/TRAINING PROGRAM

## 2024-01-29 PROCEDURE — 99232 SBSQ HOSP IP/OBS MODERATE 35: CPT | Performed by: SURGERY

## 2024-01-29 PROCEDURE — 97530 THERAPEUTIC ACTIVITIES: CPT

## 2024-01-29 PROCEDURE — 86920 COMPATIBILITY TEST SPIN: CPT

## 2024-01-29 PROCEDURE — 99232 SBSQ HOSP IP/OBS MODERATE 35: CPT | Performed by: PHYSICIAN ASSISTANT

## 2024-01-29 PROCEDURE — NC001 PR NO CHARGE: Performed by: SURGERY

## 2024-01-29 PROCEDURE — 86901 BLOOD TYPING SEROLOGIC RH(D): CPT | Performed by: STUDENT IN AN ORGANIZED HEALTH CARE EDUCATION/TRAINING PROGRAM

## 2024-01-29 PROCEDURE — 97116 GAIT TRAINING THERAPY: CPT

## 2024-01-29 PROCEDURE — 97167 OT EVAL HIGH COMPLEX 60 MIN: CPT

## 2024-01-29 PROCEDURE — 82948 REAGENT STRIP/BLOOD GLUCOSE: CPT

## 2024-01-29 RX ORDER — CEFAZOLIN SODIUM 1 G/50ML
1000 SOLUTION INTRAVENOUS ONCE
Status: CANCELLED | OUTPATIENT
Start: 2024-01-29 | End: 2024-01-29

## 2024-01-29 RX ORDER — CHLORHEXIDINE GLUCONATE ORAL RINSE 1.2 MG/ML
15 SOLUTION DENTAL ONCE
Qty: 15 ML | Refills: 0 | Status: DISCONTINUED | OUTPATIENT
Start: 2024-01-30 | End: 2024-01-29

## 2024-01-29 RX ADMIN — CLONAZEPAM 1 MG: 1 TABLET ORAL at 17:03

## 2024-01-29 RX ADMIN — INSULIN LISPRO 1 UNITS: 100 INJECTION, SOLUTION INTRAVENOUS; SUBCUTANEOUS at 06:08

## 2024-01-29 RX ADMIN — OMEGA-3 FATTY ACIDS CAP 1000 MG 1000 MG: 1000 CAP at 09:58

## 2024-01-29 RX ADMIN — OXYBUTYNIN CHLORIDE 5 MG: 5 TABLET, EXTENDED RELEASE ORAL at 09:57

## 2024-01-29 RX ADMIN — NICOTINE 1 PATCH: 21 PATCH, EXTENDED RELEASE TRANSDERMAL at 09:58

## 2024-01-29 RX ADMIN — PREGABALIN 200 MG: 100 CAPSULE ORAL at 20:28

## 2024-01-29 RX ADMIN — ENOXAPARIN SODIUM 40 MG: 40 INJECTION SUBCUTANEOUS at 09:58

## 2024-01-29 RX ADMIN — PREGABALIN 200 MG: 100 CAPSULE ORAL at 16:48

## 2024-01-29 RX ADMIN — INSULIN LISPRO 1 UNITS: 100 INJECTION, SOLUTION INTRAVENOUS; SUBCUTANEOUS at 12:23

## 2024-01-29 RX ADMIN — PANTOPRAZOLE SODIUM 40 MG: 40 TABLET, DELAYED RELEASE ORAL at 06:32

## 2024-01-29 RX ADMIN — ASPIRIN 81 MG: 81 TABLET, COATED ORAL at 09:57

## 2024-01-29 RX ADMIN — PREGABALIN 200 MG: 100 CAPSULE ORAL at 09:57

## 2024-01-29 RX ADMIN — BUPROPION HYDROCHLORIDE 300 MG: 150 TABLET, FILM COATED, EXTENDED RELEASE ORAL at 09:57

## 2024-01-29 RX ADMIN — INSULIN LISPRO 5 UNITS: 100 INJECTION, SOLUTION INTRAVENOUS; SUBCUTANEOUS at 12:23

## 2024-01-29 RX ADMIN — AMILORIDE HYDROCLORIDE 5 MG: 5 TABLET ORAL at 10:01

## 2024-01-29 RX ADMIN — DOCUSATE SODIUM 100 MG: 100 CAPSULE, LIQUID FILLED ORAL at 09:57

## 2024-01-29 RX ADMIN — ATORVASTATIN CALCIUM 10 MG: 10 TABLET, FILM COATED ORAL at 16:48

## 2024-01-29 RX ADMIN — INSULIN LISPRO 5 UNITS: 100 INJECTION, SOLUTION INTRAVENOUS; SUBCUTANEOUS at 09:59

## 2024-01-29 RX ADMIN — FLUTICASONE FUROATE AND VILANTEROL TRIFENATATE 1 PUFF: 200; 25 POWDER RESPIRATORY (INHALATION) at 10:00

## 2024-01-29 NOTE — ASSESSMENT & PLAN NOTE
77 y/o F w/ history of smoking, HTN, T2DM, GERD, COPD, R ankle fx s/p ORIF '08, AOID with nonhealing R foot wounds.      1/24 CTA abd runoff: dense coral reef plaque juxtarenal to mid aorta with multifocal moderate stenoses throughout bilateral iliac segments, intact b/l fem-pop and 3 vessel runoff     Plan:  - Pt will require revascularization w/ extra-anatomic bypass; likely right subclavian to right femoral bypass tomorrow  - NPO after MN  - Podiatry following, question salvageability of right foot with exposed necrotic Achilles tendon  - Continue ASA/Lipitor  - Cardiology acceptable risk  - ID following

## 2024-01-29 NOTE — ASSESSMENT & PLAN NOTE
Tx to [facility] for vascular eval/ongoing podiatry eval   Will need bypass w/ vascular before podiatry takes to OR   Podiatry and vascular d/w pt and family, recommending BKA.  Further discussions to be had/timing TBD   Cardio provided clearance for any procedures needed  RLE NWB  ID eval at [facility] and rec to watch off abx

## 2024-01-29 NOTE — ASSESSMENT & PLAN NOTE
Lab Results   Component Value Date    HGBA1C 6.5 (H) 11/07/2023       Recent Labs     01/28/24  1605 01/28/24  2117 01/29/24  0607 01/29/24  1101   POCGLU 72 193* 152* 153*         Blood Sugar Average: Last 72 hrs:  (P) 138.3191831475704610  C/w Humalog AC  C/w ISS

## 2024-01-29 NOTE — UTILIZATION REVIEW
NOTIFICATION OF ADMISSION DISCHARGE   This is a Notification of Discharge from [facility]. Please be advised that this patient has been discharge from our facility. Below you will find the admission and discharge date and time including the patient’s disposition.   UTILIZATION REVIEW CONTACT:  Rebecca Goldberg  Utilization   Network Utilization Review Department  Phone: [phone] carefully listen to the prompts. All voicemails are confidential.  Email: [email or web address]     ADMISSION INFORMATION  PRESENTATION DATE: 1/23/2024  7:28 PM  OBERVATION ADMISSION DATE:   INPATIENT ADMISSION DATE: 1/23/24  8:28 PM   DISCHARGE DATE: 1/26/2024  7:55 PM   DISPOSITION:[facility] Utilization Review Department  ATTENTION: Please call with any questions or concerns to [phone] and carefully listen to the prompts so that you are directed to the right person. All voicemails are confidential.   For Discharge needs, contact Care Management DC Support Team at [phone] opt. [phone]  Send all requests for admission clinical reviews, approved or denied determinations and any other requests to dedicated fax number below belonging to the campus where the patient is receiving treatment. List of dedicated fax numbers for the Facilities:  FACILITY NAME UR FAX NUMBER   ADMISSION DENIALS (Administrative/Medical Necessity) [phone]   DISCHARGE SUPPORT TEAM ([facility]) [phone]   PARENT CHILD HEALTH (Maternity/NICU/Pediatrics) [phone]   [facility] [phone]   [facility] [phone]   [facility] [phone]   [facility] [phone]   [facility] [phone]   [facility] [phone]   [facility] [phone]   [facility]  [phone]   [facility] [phone]   [facility] [phone]   [facility] [phone]   [facility] [phone]

## 2024-01-29 NOTE — PROGRESS NOTES
[facility]  Progress Note  Name: Lona Cedeno I  MRN: [id number]  Unit/Bed#: -01 I Date of Admission: 1/26/2024   Date of Service: 1/29/2024 I Hospital Day: 3    Assessment/Plan   * Diabetic foot ulcer with osteomyelitis (HCC)  Assessment & Plan  Tx to [facility] for vascular eval/ongoing podiatry eval   Will need bypass w/ vascular before podiatry takes to OR   Podiatry and vascular d/w pt and family, recommending BKA.  Further discussions to be had/timing TBD   Cardio provided clearance for any procedures needed  RLE NWB  ID eval at [facility] and rec to watch off abx    Constipation  Assessment & Plan  Bowel regimen   Reports she had BM 1/29     Tobacco use disorder  Assessment & Plan  Nicotine TD    PAD (peripheral artery disease) ([facility])  Assessment & Plan  ASA and Lipitor  LDL 80  Vascular consulted for potential bypass surgery with wounds as above   Cardio at [facility] cleared for bypass    Type 2 diabetes mellitus with diabetic polyneuropathy ([facility])  Assessment & Plan  Lab Results   Component Value Date    HGBA1C 6.5 (H) 11/07/2023       Recent Labs     01/28/24  1605 01/28/24  2117 01/29/24  0607 01/29/24  1101   POCGLU 72 193* 152* 153*         Blood Sugar Average: Last 72 hrs:  (P) 138.9338463923704054  C/w Humalog AC  C/w ISS           VTE Pharmacologic Prophylaxis: VTE Score: 4 Moderate Risk (Score 3-4) - Pharmacological DVT Prophylaxis Ordered: enoxaparin (Lovenox).    Mobility:   Basic Mobility Inpatient Raw Score: 16  JH-HLM Goal: 5: Stand one or more mins  JH-HLM Achieved: 6: Walk 10 steps or more  HLM Goal achieved. Continue to encourage appropriate mobility.    Patient Centered Rounds: I performed bedside rounds with nursing staff today.   Discussions with Specialists or Other Care Team Provider: CM     Education and Discussions with Family / Patient: Updated  (daughter) at bedside. Ariela     Total Time Spent on Date of Encounter in care of patient: 20 mins.  This time was spent on one or more of the following: performing physical exam; counseling and coordination of care; obtaining or reviewing history; documenting in the medical record; reviewing/ordering tests, medications or procedures; communicating with other healthcare professionals and discussing with patient's family/caregivers.    Current Length of Stay: 3 day(s)  Current Patient Status: Inpatient   Certification Statement: The patient will continue to require additional inpatient hospital stay due to surgical planning  Discharge Plan: Anticipate discharge in >72 hrs to discharge location to be determined pending rehab evaluations.    Code Status: Level 2 - DNAR: but accepts endotracheal intubation    Subjective:   Upset with news that she is recommended for BKA, discussing with her family what she will do.     Objective:     Vitals:   Temp (24hrs), Av.8 °F (36.6 °C), Min:97.4 °F (36.3 °C), Max:98.2 °F (36.8 °C)    Temp:  [97.4 °F (36.3 °C)-98.2 °F (36.8 °C)] 97.4 °F (36.3 °C)  HR:  [83] 83  Resp:  [16] 16  BP: (109-134)/(65-77) 109/65  SpO2:  [92 %] 92 %  There is no height or weight on file to calculate BMI.     Input and Output Summary (last 24 hours):   No intake or output data in the 24 hours ending 24 7814    Physical Exam:   Physical Exam  Vitals reviewed.   Constitutional:       General: She is not in acute distress.     Appearance: She is not toxic-appearing.   HENT:      Head: Normocephalic and atraumatic.   Eyes:      Extraocular Movements: Extraocular movements intact.   Cardiovascular:      Rate and Rhythm: Normal rate and regular rhythm.   Pulmonary:      Effort: Pulmonary effort is normal. No respiratory distress.   Abdominal:      General: Bowel sounds are normal. There is no distension.      Palpations: Abdomen is soft.   Musculoskeletal:         General: Normal range of motion.      Comments: R foot wrapped    Neurological:      General: No focal deficit present.      Mental Status:  She is alert and oriented to person, place, and time.   Psychiatric:         Mood and Affect: Mood normal.         Behavior: Behavior normal.         Thought Content: Thought content normal.          Additional Data:     Labs:  Results from last 7 days   Lab Units 01/28/24  0619   WBC Thousand/uL 8.05   HEMOGLOBIN g/dL 13.2   HEMATOCRIT % 40.8   PLATELETS Thousands/uL 332   NEUTROS PCT % 64   LYMPHS PCT % 22   MONOS PCT % 7   EOS PCT % 6     Results from last 7 days   Lab Units 01/28/24  0619 01/24/24  0505 01/23/24  1940   SODIUM mmol/L 141   < > 135   POTASSIUM mmol/L 3.9   < > 4.5   CHLORIDE mmol/L 105   < > 99   CO2 mmol/L 28   < > 25   BUN mg/dL 10   < > 12   CREATININE mg/dL 0.42*   < > 0.54*   ANION GAP mmol/L 8   < > 11   CALCIUM mg/dL 9.7   < > 10.3*   ALBUMIN g/dL  --   --  4.2   TOTAL BILIRUBIN mg/dL  --   --  0.21   ALK PHOS U/L  --   --  64   ALT U/L  --   --  7   AST U/L  --   --  10*   GLUCOSE RANDOM mg/dL 127   < > 120    < > = values in this interval not displayed.     Results from last 7 days   Lab Units 01/23/24  1940   INR  1.03     Results from last 7 days   Lab Units 01/29/24  1101 01/29/24  0607 01/28/24  2117 01/28/24  1605 01/28/24  1044 01/28/24  0641 01/27/24  2119 01/27/24  1637 01/27/24  1044 01/27/24  0542 01/26/24  2140 01/26/24  1609   POC GLUCOSE mg/dl 153* 152* 193* 72 151* 133 190* 76 146* 112 144* 154*         Results from last 7 days   Lab Units 01/24/24  0505 01/23/24  2223 01/23/24  1940   LACTIC ACID mmol/L  --  1.7 3.3*   PROCALCITONIN ng/ml <0.05  --  <0.05       Lines/Drains:  Invasive Devices       Peripheral Intravenous Line  Duration             Peripheral IV 01/29/24 Left;Ventral (anterior) Forearm <1 day              Drain  Duration             Ureteral Internal Stent Left ureter 6 Fr. 70 days                          Imaging: No pertinent imaging reviewed.    Recent Cultures (last 7 days):   Results from last 7 days   Lab Units 01/23/24 1952 01/23/24 1945  01/23/24 1940   BLOOD CULTURE  No Growth After 5 Days.  --  No Growth After 5 Days.   GRAM STAIN RESULT   --  No Polys or Bacteria seen  --    WOUND CULTURE   --  1+ Growth of  --        Last 24 Hours Medication List:   Current Facility-Administered Medications   Medication Dose Route Frequency Provider Last Rate    acetaminophen  650 mg Oral Q4H PRN Cristian Cerna, DO      albuterol  2 puff Inhalation Q6H PRN Cristian Cerna, DO      AMILoride  5 mg Oral Daily Cristian Cerna, DO      aspirin  81 mg Oral Daily Cristian Cerna, DO      atorvastatin  10 mg Oral Daily With Dinner Cristian Cerna, DO      buPROPion  300 mg Oral Daily Cristian Cerna, DO      clonazePAM  1 mg Oral QPM Cristian Cerna, DO      docusate sodium  100 mg Oral BID Cristian Cerna, DO      enoxaparin  40 mg Subcutaneous Daily Cristian Cerna, DO      fish oil  1,000 mg Oral Daily Cristian Cerna, DO      fluticasone-vilanterol  1 puff Inhalation Daily Cristian Cerna, DO      insulin lispro  1-5 Units Subcutaneous TID AC Cristian Cerna, DO      insulin lispro  1-5 Units Subcutaneous HS Cristian Cerna, DO      insulin lispro  5 Units Subcutaneous TID With Meals Cristian Cerna, DO      nicotine  1 patch Transdermal Daily Cristian Cerna,       ondansetron  4 mg Intravenous Q6H PRN Cristian Cerna, DO      oxybutynin  5 mg Oral Daily Cristian Cerna, DO      pantoprazole  40 mg Oral Early Morning Cristian Cerna, DO      pregabalin  200 mg Oral TID Cristian Cerna, DO      senna  1 tablet Oral HS Cristian Cerna, DO          Today, Patient Was Seen By: Penny Herr PA-C    **Please Note: This note may have been constructed using a voice recognition system.**

## 2024-01-29 NOTE — OCCUPATIONAL THERAPY NOTE
Occupational Therapy Evaluation     Patient Name: Lona Cedeno  Today's Date: 2024  Problem List  Principal Problem:    Diabetic foot ulcer with osteomyelitis ([facility])  Active Problems:    Type 2 diabetes mellitus with diabetic polyneuropathy (HCC)    PAD (peripheral artery disease) ([facility])    Tobacco use disorder    Constipation    Past Medical History  Past Medical History:   Diagnosis Date    Anxiety     Closed fracture of coccyx (HCC) 2023    Diabetes mellitus ([facility])     GERD (gastroesophageal reflux disease)     Hyperlipidemia     Hypertension     IBS (irritable bowel syndrome)     Shoulder fracture      Past Surgical History  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;  Surgeon: Cristian Child MD;  Location: [facility];  Service: Urology    FL RETROGRADE PYELOGRAM  2023    HERNIA REPAIR      umbilicus w/ mesh           24 1335   OT Last Visit   OT Visit Date 24   Note Type   Note type Evaluation   Pain Assessment   Pain Assessment Tool 0-10   Pain Score No Pain   Restrictions/Precautions   Weight Bearing Precautions Per Order Yes   RLE Weight Bearing Per Order (S)  NWB   Other Precautions Fall Risk;Pain;WBS   Home Living   Type of Home House   Home Layout One level  (5+5STE)   Bathroom Shower/Tub Walk-in shower   Bathroom Toilet Raised   Bathroom Equipment Grab bars in shower;Shower chair   Home Equipment Cane  (+ rollator. Reports only using SPC for stairs, uses rollator when shopping)   Prior Function   Level of  "[place] Independent with ADLs;Independent with IADLS   Lives With Spouse;Daughter  (spouse is legally blind + has parkinson's, dtr is mentally challenged)   Receives Help From Family   IADLs Independent with driving;Independent with meal prep;Independent with medication management   Falls in the last 6 months 0   Vocational Retired   Lifestyle   Autonomy PTA, pt reports being I with ADLs/IADLs, no AD for fnxl mobility in home, uses SPC for stairs and rollator in stores, (+)    Reciprocal Relationships Family, has additional dtr who lives locally and can assist if needed   Service to Others Retired   Intrinsic Gratification TV - likes crime shows   Subjective   Subjective \"I haven't had pain\"   ADL   Where Assessed Edge of bed   Eating Assistance 7  Independent   Grooming Assistance 7  Independent   UB Bathing Assistance 5  Supervision/Setup   LB Bathing Assistance 4  Minimal Assistance   UB Dressing Assistance 5  Supervision/Setup   LB Dressing Assistance 4  Minimal Assistance   Toileting Assistance  4  Minimal Assistance   Bed Mobility   Supine to Sit Unable to assess   Sit to Supine Unable to assess   Additional Comments Pt seated upright in bed upon arrival   Transfers   Sit to Stand 4  Minimal assistance   Additional items Assist x 1;Increased time required   Stand to Sit 4  Minimal assistance   Additional items Assist x 1;Increased time required   Additional Comments transfers with RW   Functional Mobility   Functional Mobility 4  Minimal assistance   Additional Comments min Ax1 + SBA of another, cues to maintain NWB   Additional items Rolling walker   Balance   Static Sitting Good   Dynamic Sitting Fair +   Static Standing Fair -   Dynamic Standing Poor +   Ambulatory Poor +   Activity Tolerance   Activity Tolerance Patient limited by fatigue   Medical Staff Made Aware PT Georgie   Nurse Made Aware RN clearance for session   RUE Assessment   RUE Assessment WFL  (hx of shoulder fxs - ROM WFL)   LUE " Assessment   LUE Assessment WFL   Cognition   Overall Cognitive Status WFL   Arousal/Participation Responsive;Cooperative   Attention Within functional limits   Orientation Level Oriented X4   Memory Within functional limits   Following Commands Follows one step commands without difficulty   Comments Pt pleasant and cooperative t/o session   Assessment   Limitation Decreased ADL status;Decreased Safe judgement during ADL;Decreased endurance;Decreased self-care trans;Decreased high-level ADLs   Prognosis Good   Assessment Pt is a 78 y.o. female [facility] w/ Diabetic foot ulcer with osteomyelitis (HCC). Pt tentatively planned for revascularization. No formal plan for amputation at this time.  has a past medical history of Anxiety, Closed fracture of coccyx  (05/24/2023), Diabetes mellitus, GERD, Hyperlipidemia, Hypertension, IBS, and Shoulder fracture. Pt with active OT orders and up and OOB as tolerated orders. Pt seen with PT due to the patient's co-morbidities, clinically unstable presentation/clinical complexity, and present impairments. As per pt report, pta, resides with her  and dtr in a 1STH, 5+5STE. Pt was I w/  ADLS and IADLS, (+) drove. Upon evaluation, pt currently requires MIN A with RW for transfers and MIN A + SBA of another with RW for mobility. Pt currently requires I eating, I grooming, S UB ADLs, MIN A LB ADLs, and MIN A toileting. Pt is limited at this time 2*: endurance, activity tolerance, functional mobility, balance, functional standing tolerance, decreased I w/ ADLS/IADLS, and strength.The following Occupational Performance Areas to address include: grooming, bathing/shower, toilet hygiene, dressing, health maintenance, functional mobility, community mobility, and clothing management. Pt to benefit from immediate acute skilled OT to address above deficits, improve overall functional independence, maximize fnxl mobility and reduce caregiver burden. From OT standpoint,  recommendation at time of d/c would be STR - pending progress.  Pt was left after session with all current needs met. The patient's raw score on the AM-PAC Daily Activity Inpatient Short Form is 20. A raw score of greater than or equal to 19 suggests the patient may benefit from discharge to home. Please refer to the recommendation of the Occupational Therapist for safe discharge planning.   Goals   [facility] Time Frame 10-14   Plan   Treatment Interventions ADL retraining;Functional transfer training;Endurance training;UE strengthening/ROM;Patient/family training;Equipment evaluation/education;Compensatory technique education;Continued evaluation;Energy conservation;Activityengagement   Goal Expiration Date 02/12/24   OT Frequency 2-3x/wk   Discharge Recommendation   Rehab Resource Intensity Level, OT I (Maximum Resource Intensity)   AM-PAC Daily Activity Inpatient   Lower Body Dressing 3   Bathing 3   Toileting 3   Upper Body Dressing 3   Grooming 4   Eating 4   Daily Activity Raw Score 20   Daily Activity Standardized Score (Calc for Raw Score >=11) 42.03   AM-[facility] Applied Cognition Inpatient   Following a Speech/Presentation 4   Understanding Ordinary Conversation 4   Taking Medications 4   Remembering Where Things Are Placed or Put Away 4   Remembering List of 4-5 Errands 4   Taking Care of Complicated Tasks 4   Applied Cognition Raw Score 24   Applied Cognition Standardized Score 62.21     GOALS    - Pt will complete UB dressing/self care/bathing w/ mod I using adaptive device and DME as needed    - Pt will complete LB dressing/self care/bathing w/ mod I using adaptive device and DME as needed    - Pt will complete toileting w/ mod I w/ G hygiene/thoroughness using DME as needed    - Pt will improve functional transfers to Mod I on/off all surfaces using DME as needed w/ G balance/safety     - Pt will improve functional mobility during ADL/IADL/leisure tasks to Mod I using DME as needed w/ G balance/safety     - Pt  will demonstrate G carryover of pt/caregiver education and training as appropriate.    - Pt will demonstrate 100% carryover of energy conservation techniques t/o functional I/ADL/leisure tasks w/o cues s/p skilled education    - Pt will engage in ongoing cognitive assessment w/ G participation to assist w/ safe d/c planning/recommendations    - Pt will increase static and dynamic standing/sitting balance to F+  using AD/DME as needed to increase safety and I during fnxl transfers and ADLs    - Pt will maintain upright sitting position for at least 20 min during dynamic fnxl activity with F+  balance and endurance to improve ADL performance and independence, as well as reduce risk of falls     - Pt will participate in simulated IADL management task with DME as needed to increase independence to  w/ G safety and endurance    Osiris Lamar MS, OTR/L

## 2024-01-29 NOTE — PROGRESS NOTES
Vascular Surgery   Progress Note    Pt seen at bedside.   Podiatry resident, Dr. Zaid Mccarthy present and currently broaching unlikelihood of salvageability of RLE and recommendation for higher level amputation.     Discussed process of BKA and recovery/ timeline of basic expectations.    Also, notified pt that due to emergency and shifts in operative schedule, surgery planned for tomorrow is now cancelled.   This will give us the opportunity for ongoing discussion and determination of whether to proceed w/ R AxFem and R BKA (if pt agreeable) as combined or staged procedure.  Surgery date TBD.  Lisinopril held for ELIZA risk reduction    All questions answered.   Daughter, Rozina Feng, present at bedside and requesting updates as able:  [phone].  Pt agreed that daughter is her primary contact and should receive information.        Felipa Guevara-Callie  1/29/2024

## 2024-01-29 NOTE — PLAN OF CARE
Problem: Prexisting or High Potential for Compromised Skin Integrity  Goal: Skin integrity is maintained or improved  Description: INTERVENTIONS:  - Identify patients at risk for skin breakdown  - Assess and monitor skin integrity  - Assess and monitor nutrition and hydration status  - Monitor labs   - Assess for incontinence   - Turn and reposition patient  - Assist with mobility/ambulation  - Relieve pressure over bony prominences  - Avoid friction and shearing  - Provide appropriate hygiene as needed including keeping skin clean and dry  - Evaluate need for skin moisturizer/barrier cream  - Collaborate with interdisciplinary team   - Patient/family teaching  - Consider wound care consult   Outcome: Progressing     Problem: PAIN - ADULT  Goal: Verbalizes/displays adequate comfort level or baseline comfort level  Description: Interventions:  - Encourage patient to monitor pain and request assistance  - Assess pain using appropriate pain scale  - Administer analgesics based on type and severity of pain and evaluate response  - Implement non-pharmacological measures as appropriate and evaluate response  - Consider cultural and social influences on pain and pain management  - Notify physician/advanced practitioner if interventions unsuccessful or patient reports new pain  Outcome: Progressing     Problem: INFECTION - ADULT  Goal: Absence or prevention of progression during hospitalization  Description: INTERVENTIONS:  - Assess and monitor for signs and symptoms of infection  - Monitor lab/diagnostic results  - Monitor all insertion sites, i.e. indwelling lines, tubes, and drains  - Monitor endotracheal if appropriate and nasal secretions for changes in amount and color  - [place] appropriate cooling/warming therapies per order  - Administer medications as ordered  - Instruct and encourage patient and family to use good hand hygiene technique  - Identify and instruct in appropriate isolation precautions for  identified infection/condition  Outcome: Progressing  Goal: Absence of fever/infection during neutropenic period  Description: INTERVENTIONS:  - Monitor WBC    Outcome: Progressing     Problem: SAFETY ADULT  Goal: Patient will remain free of falls  Description: INTERVENTIONS:  - Educate patient/family on patient safety including physical limitations  - Instruct patient to call for assistance with activity   - Consult OT/PT to assist with strengthening/mobility   - Keep Call bell within reach  - Keep bed low and locked with side rails adjusted as appropriate  - Keep care items and personal belongings within reach  - Initiate and maintain comfort rounds  - Make Fall Risk Sign visible to staff  - Offer Toileting every  Hours, in advance of need  - Initiate/Maintain alarm  - Obtain necessary fall risk management equipment:   - Apply yellow socks and bracelet for high fall risk patients  - Consider moving patient to room near nurses station  Outcome: Progressing  Goal: Maintain or return to baseline ADL function  Description: INTERVENTIONS:  -  Assess patient's ability to carry out ADLs; assess patient's baseline for ADL function and identify physical deficits which impact ability to perform ADLs (bathing, care of mouth/teeth, toileting, grooming, dressing, etc.)  - Assess/evaluate cause of self-care deficits   - Assess range of motion  - Assess patient's mobility; develop plan if impaired  - Assess patient's need for assistive devices and provide as appropriate  - Encourage maximum independence but intervene and supervise when necessary  - Involve family in performance of ADLs  - Assess for home care needs following discharge   - Consider OT consult to assist with ADL evaluation and planning for discharge  - Provide patient education as appropriate  Outcome: Progressing  Goal: Maintains/Returns to pre admission functional level  Description: INTERVENTIONS:  - Perform AM-PAC 6 Click Basic Mobility/ Daily Activity  assessment daily.  - Set and communicate daily mobility goal to care team and patient/family/caregiver.   - Collaborate with rehabilitation services on mobility goals if consulted  - Perform Range of Motion  times a day.  - Reposition patient every  hours.  - Dangle patient  times a day  - Stand patient  times a day  - Ambulate patient  times a day  - Out of bed to chair  times a day   - Out of bed for meal times a day  - Out of bed for toileting  - Record patient progress and toleration of activity level   Outcome: Progressing     Problem: DISCHARGE PLANNING  Goal: Discharge to home or other facility with appropriate resources  Description: INTERVENTIONS:  - Identify barriers to discharge w/patient and caregiver  - Arrange for needed discharge resources and transportation as appropriate  - Identify discharge learning needs (meds, wound care, etc.)  - Arrange for interpretive services to assist at discharge as needed  - Refer to Case Management Department for coordinating discharge planning if the patient needs post-hospital services based on physician/advanced practitioner order or complex needs related to functional status, cognitive ability, or social support system  Outcome: Progressing     Problem: Knowledge Deficit  Goal: Patient/family/caregiver demonstrates understanding of disease process, treatment plan, medications, and discharge instructions  Description: Complete learning assessment and assess knowledge base.  Interventions:  - Provide teaching at level of understanding  - Provide teaching via preferred learning methods  Outcome: Progressing

## 2024-01-29 NOTE — PLAN OF CARE
Problem: INFECTION - ADULT  Goal: Absence or prevention of progression during hospitalization  Description: INTERVENTIONS:  - Assess and monitor for signs and symptoms of infection  - Monitor lab/diagnostic results  - Monitor all insertion sites, i.e. indwelling lines, tubes, and drains  - Monitor endotracheal if appropriate and nasal secretions for changes in amount and color  - [place] appropriate cooling/warming therapies per order  - Administer medications as ordered  - Instruct and encourage patient and family to use good hand hygiene technique  - Identify and instruct in appropriate isolation precautions for identified infection/condition  Outcome: Progressing

## 2024-01-29 NOTE — PLAN OF CARE
Problem: PHYSICAL THERAPY ADULT  Goal: Performs mobility at highest level of function for planned discharge setting.  See evaluation for individualized goals.  Description: Treatment/Interventions: Functional transfer training, Therapeutic exercise, Endurance training, Gait training, Bed mobility, Equipment eval/education, Elevations  Equipment Recommended: Walker       See flowsheet documentation for full assessment, interventions and recommendations.  Outcome: Progressing  Note: Prognosis: Fair  Problem List: Decreased strength, Decreased range of motion, Decreased endurance, Impaired balance, Decreased mobility, Decreased safety awareness, Impaired judgement, Orthopedic restrictions  Assessment: Pt seen for PT treatment session with focus on functional transfers, functional mobility, pt education.  Pt making slow but steady progress toward goals this session.  Pt continues to have difficulty maintaining WBS during transfers and mobility despite education.  Pt also with few LOB during attempts to hop to with RW, requires increased assist to recover.  Pt remains at risk for falls due to same.  Pt left supine in bed with bed alarm intact and with all needs in reach.  Pt will benefit from skilled therapy in order to address current impairments and functional limitations. PT to follow pt and recommending rehab.  The patient's AM-PAC Basic Mobility Inpatient Short Form Raw Score is 16. A Raw score of less than or equal to 16 suggests the patient may benefit from discharge to post-acute rehabilitation services. Please also refer to the recommendation of the Physical Therapist for safe discharge planning.  Barriers to Discharge: Inaccessible home environment, Decreased caregiver support     Rehab Resource Intensity Level, PT: II (Moderate Resource Intensity)    See flowsheet documentation for full assessment.

## 2024-01-29 NOTE — TREATMENT PLAN
Podiatry Treatment Plan   -Patient seen at bedside with vascular team and patients daughter regarding the salvageability of her right lower extremity.  -Given the significant peripheral arterial disease, exposed Achilles tendon and osteomyelitis of the forefoot salvageability of the right lower extremity is highly unlikely.  -Discussed basic timelines regarding limb salvage versus proximal amputation.  At this time it appears the patient's priority is avoiding a prolonged healing time.  We discussed that attempted limb salvage has a high chance to fail and will require an extended period of nonweightbearing and healing.  -Will return tomorrow 1/30 for a finalized discussion regarding limb salvage versus proximal amputation.

## 2024-01-29 NOTE — PROGRESS NOTES
[facility]  Progress Note  Name: Lona Cedeno I  MRN: [id number]  Unit/Bed#: -01 I Date of Admission: 1/26/2024   Date of Service: 1/29/2024 I Hospital Day: 3    Assessment/Plan   PAD (peripheral artery disease) ([facility])  Assessment & Plan  77 y/o F w/ history of smoking, HTN, T2DM, GERD, COPD, R ankle fx s/p ORIF '08, AOID with nonhealing R foot wounds.      1/24 CTA abd runoff: dense coral reef plaque juxtarenal to mid aorta with multifocal moderate stenoses throughout bilateral iliac segments, intact b/l fem-pop and 3 vessel runoff     Plan:  - Pt will require revascularization w/ extra-anatomic bypass; likely right subclavian to right femoral bypass tomorrow  - NPO after MN  - Podiatry following, question salvageability of right foot with exposed necrotic Achilles tendon  - Continue ASA/Lipitor  - Cardiology acceptable risk  - ID following               Subjective:  Pt doing well, no events overnight    Vitals:  /65   Pulse 83   Temp (!) 97.4 °F (36.3 °C)   Resp 16   SpO2 92%     I/Os:  I/O last 3 completed shifts:  In: 280 [P.O.:280]  Out: 550 [Urine:550]  No intake/output data recorded.    Lab Results and Cultures:   Lab Results   Component Value Date    WBC 8.05 01/28/2024    HGB 13.2 01/28/2024    HCT 40.8 01/28/2024    MCV 96 01/28/2024     01/28/2024     Lab Results   Component Value Date    CALCIUM 9.7 01/28/2024     05/21/2018    K 3.9 01/28/2024    CO2 28 01/28/2024     01/28/2024    BUN 10 01/28/2024    CREATININE 0.42 (L) 01/28/2024     Lab Results   Component Value Date    INR 1.03 01/23/2024    INR 1.08 08/11/2023    PROTIME 13.6 01/23/2024    PROTIME 14.1 08/11/2023        Blood Culture:   Lab Results   Component Value Date    BLOODCX No Growth After 4 Days. 01/23/2024   ,   Urinalysis:   Lab Results   Component Value Date    COLORU Yellow 11/20/2023    COLORU YELLOW 05/21/2018    CLARITYU Clear 11/20/2023    CLARITYU CLEAR  05/21/2018    SPECGRAV 1.010 11/20/2023    SPECGRAV 1.015 05/21/2018    PHUR 5.0 11/20/2023    PHUR 6.0 05/21/2018    LEUKOCYTESUR Negative 11/20/2023    LEUKOCYTESUR NEGATIVE 05/21/2018    NITRITE Negative 11/20/2023    NITRITE NEGATIVE 05/21/2018    PROTEINUA NEGATIVE 05/21/2018    GLUCOSEU Negative 11/20/2023    GLUCOSEU NEGATIVE 05/21/2018    KETONESU Negative 11/20/2023    KETONESU NEGATIVE 05/21/2018    BILIRUBINUR Negative 11/20/2023    BILIRUBINUR NEGATIVE 05/21/2018    BLOODU Negative 11/20/2023    BLOODU NEGATIVE 05/21/2018   ,   Urine Culture:   Lab Results   Component Value Date    URINECX No Growth <1000 cfu/mL 11/20/2023   ,   Wound Culure:   Lab Results   Component Value Date    WOUNDCULT 1+ Growth of 01/23/2024       Medications:  Current Facility-Administered Medications   Medication Dose Route Frequency    acetaminophen (TYLENOL) tablet 650 mg  650 mg Oral Q4H PRN    albuterol (PROVENTIL HFA,VENTOLIN HFA) inhaler 2 puff  2 puff Inhalation Q6H PRN    AMILoride tablet 5 mg  5 mg Oral Daily    aspirin (ECOTRIN LOW STRENGTH) EC tablet 81 mg  81 mg Oral Daily    atorvastatin (LIPITOR) tablet 10 mg  10 mg Oral Daily With Dinner    buPROPion (WELLBUTRIN XL) 24 hr tablet 300 mg  300 mg Oral Daily    [START ON 1/30/2024] chlorhexidine (PERIDEX) 0.12 % oral rinse 15 mL  15 mL Swish & Spit Once    clonazePAM (KlonoPIN) tablet 1 mg  1 mg Oral QPM    docusate sodium (COLACE) capsule 100 mg  100 mg Oral BID    enoxaparin (LOVENOX) subcutaneous injection 40 mg  40 mg Subcutaneous Daily    fish oil capsule 1,000 mg  1,000 mg Oral Daily    fluticasone-vilanterol 200-25 mcg/actuation 1 puff  1 puff Inhalation Daily    insulin lispro (HumaLOG) 100 units/mL subcutaneous injection 1-5 Units  1-5 Units Subcutaneous TID AC    insulin lispro (HumaLOG) 100 units/mL subcutaneous injection 1-5 Units  1-5 Units Subcutaneous HS    insulin lispro (HumaLOG) 100 units/mL subcutaneous injection 5 Units  5 Units Subcutaneous TID  With Meals    nicotine (NICODERM CQ) 21 mg/24 hr TD 24 hr patch 1 patch  1 patch Transdermal Daily    ondansetron (ZOFRAN) injection 4 mg  4 mg Intravenous Q6H PRN    oxybutynin (DITROPAN-XL) 24 hr tablet 5 mg  5 mg Oral Daily    pantoprazole (PROTONIX) EC tablet 40 mg  40 mg Oral Early Morning    pregabalin (LYRICA) capsule 200 mg  200 mg Oral TID    senna (SENOKOT) tablet 8.6 mg  1 tablet Oral HS         Physical Exam:    General appearance: alert and oriented, in no acute distress  Lungs: clear to auscultation bilaterally  Heart: regular rate and rhythm, S1, S2 normal, no murmur, click, rub or gallop  Abdomen: soft, non-tender; bowel sounds normal; no masses,  no organomegaly  Extremities:  Right foot with achilles and  dorsomedial ankle wound, M/S intact    Wound/Incision:  dry     Pulse exam  Femoral: Right: non-palpable Left: non-palpable  DP: Right: doppler signal Left: doppler signal  PT: Right: doppler signal Left: doppler signal      Shaneka Ortega PA-C  1/29/2024

## 2024-01-29 NOTE — ASSESSMENT & PLAN NOTE
ASA and Lipitor  LDL 80  Vascular consulted for potential bypass surgery with wounds as above   Cardio at [facility] cleared for bypass

## 2024-01-29 NOTE — PHYSICAL THERAPY NOTE
PHYSICAL THERAPY NOTE          Patient Name: Lona Cedeno  Today's Date: 1/29/2024 01/29/24 1345   PT Last Visit   PT Visit Date 01/29/24   Note Type   Note Type Treatment   Pain Assessment   Pain Assessment Tool 0-10   Pain Score No Pain   Restrictions/Precautions   Weight Bearing Precautions Per Order Yes   RLE Weight Bearing Per Order (S)  NWB   Other Precautions Fall Risk;Pain;WBS   General   Chart Reviewed Yes   Response to Previous Treatment Patient with no complaints from previous session.   Family/Caregiver Present Yes   Cognition   Overall Cognitive Status WFL   Arousal/Participation Alert   Attention Within functional limits   Memory Decreased recall of precautions   Following Commands Follows one step commands without difficulty   Comments Poor-fair carryover of education regarding WBS.   Subjective   Subjective Pleasant and agreeable to participate.   Bed Mobility   Sit to Supine 5  Supervision   Additional items HOB elevated   Additional Comments Found sitting EOB.  Left supine in bed with bed alarm intact and all needs in reach.   Transfers   Sit to Stand 4  Minimal assistance   Additional items Assist x 1;Increased time required;Verbal cues   Stand to Sit 4  Minimal assistance   Additional items Assist x 1;Increased time required;Verbal cues   Additional Comments with RW.  STS performed multiple times throughout session.   Ambulation/Elevation   Gait pattern   (hop to)   Gait Assistance 3  Moderate assist   Additional items Assist x 1;Verbal cues;Tactile cues   Assistive Device Rolling walker   Distance 6 ft x 1   Balance   Static Sitting Good   Dynamic Sitting Fair +   Static Standing Fair -   Dynamic Standing Poor +   Ambulatory Poor +   Activity Tolerance   Activity Tolerance Patient limited by fatigue   Medical Staff Made Aware MIRNA Newell   Nurse Made Aware RN cleared pt to be seen by PT   Exercises   Neuro  re-ed STS performed 3x with focus on static standing balance once upright while maintaining NWB R LE.  Able to tolerate standing for ~1 min each trial.   Assessment   Prognosis Fair   Problem List Decreased strength;Decreased range of motion;Decreased endurance;Impaired balance;Decreased mobility;Decreased safety awareness;Impaired judgement;Orthopedic restrictions   Assessment Pt seen for PT treatment session with focus on functional transfers, functional mobility, pt education.  Pt making slow but steady progress toward goals this session.  Pt continues to have difficulty maintaining WBS during transfers and mobility despite education.  Pt also with few LOB during attempts to hop to with RW, requires increased assist to recover.  Pt remains at risk for falls due to same.  Pt left supine in bed with bed alarm intact and with all needs in reach.  Pt will benefit from skilled therapy in order to address current impairments and functional limitations. PT to follow pt and recommending rehab.  The patient's [facility] Basic Mobility Inpatient Short Form Raw Score is 16. A Raw score of less than or equal to 16 suggests the patient may benefit from discharge to post-acute rehabilitation services. Please also refer to the recommendation of the Physical Therapist for safe discharge planning.   Barriers to Discharge Inaccessible home environment;Decreased caregiver support   Goals   Patient Goals to be able to walk on her foot   LTG Expiration Date 02/06/24   Plan   Treatment/Interventions Functional transfer training;LE strengthening/ROM;Therapeutic exercise;Endurance training;Patient/family training;Equipment eval/education;Gait training;Bed mobility;Spoke to nursing   Progress Slow progress, decreased activity tolerance   PT Frequency 3-5x/wk   Discharge Recommendation   Rehab Resource Intensity Level, PT II (Moderate Resource Intensity)   AM-PAC Basic Mobility Inpatient   Turning in Flat Bed Without Bedrails 4   Lying on  Back to Sitting on Edge of Flat Bed Without Bedrails 3   Moving Bed to Chair 3   Standing Up From Chair Using Arms 3   Walk in Room 2   Climb 3-5 Stairs With Railing 1   Basic Mobility Inpatient Raw Score 16   Basic Mobility Standardized Score 38.32   Highest Level Of Mobility   -HLM Goal 5: Stand one or more mins   JH-HLM Achieved 5: Stand (1 or more minutes)       Georgie Qureshi, PT, DPT

## 2024-01-29 NOTE — PLAN OF CARE
Problem: OCCUPATIONAL THERAPY ADULT  Goal: Performs self-care activities at highest level of function for planned discharge setting.  See evaluation for individualized goals.  Description: Treatment Interventions: ADL retraining, Functional transfer training, Endurance training, UE strengthening/ROM, Patient/family training, Equipment evaluation/education, Compensatory technique education, Continued evaluation, Energy conservation, Activityengagement          See flowsheet documentation for full assessment, interventions and recommendations.   Note: Limitation: Decreased ADL status, Decreased Safe judgement during ADL, Decreased endurance, Decreased self-care trans, Decreased high-level ADLs  Prognosis: Good  Assessment: Pt is a 78 y.o. female admitted to \[facility]\"" on 1/26/2024 w/ Diabetic foot ulcer with osteomyelitis (HCC). Pt tentatively planned for revascularization. No formal plan for amputation at this time.  has a past medical history of Anxiety, Closed fracture of coccyx  (05/24/2023), Diabetes mellitus, GERD, Hyperlipidemia, Hypertension, IBS, and Shoulder fracture. Pt with active OT orders and up and OOB as tolerated orders. Pt seen with PT due to the patient's co-morbidities, clinically unstable presentation/clinical complexity, and present impairments. As per pt report, pta, resides with her  and dtr in a 1STH, 5+5STE. Pt was I w/  ADLS and IADLS, (+) drove. Upon evaluation, pt currently requires MIN A with RW for transfers and MIN A + SBA of another with RW for mobility. Pt currently requires I eating, I grooming, S UB ADLs, MIN A LB ADLs, and MIN A toileting. Pt is limited at this time 2*: endurance, activity tolerance, functional mobility, balance, functional standing tolerance, decreased I w/ ADLS/IADLS, and strength.The following Occupational Performance Areas to address include: grooming, bathing/shower, toilet hygiene, dressing, health maintenance, functional mobility, community mobility, and  clothing management. Pt to benefit from immediate acute skilled OT to address above deficits, improve overall functional independence, maximize fnxl mobility and reduce caregiver burden. From OT standpoint, recommendation at time of d/c would be STR - pending progress.  Pt was left after session with all current needs met. The patient's raw score on the AM-PAC Daily Activity Inpatient Short Form is 20. A raw score of greater than or equal to 19 suggests the patient may benefit from discharge to home. Please refer to the recommendation of the Occupational Therapist for safe discharge planning.     Rehab Resource Intensity Level, OT: I (Maximum Resource Intensity)

## 2024-01-30 LAB
ANION GAP SERPL CALCULATED.3IONS-SCNC: 7 MMOL/L
BUN SERPL-MCNC: 12 MG/DL (ref 5–25)
CALCIUM SERPL-MCNC: 9.6 MG/DL (ref 8.4–10.2)
CHLORIDE SERPL-SCNC: 103 MMOL/L (ref 96–108)
CO2 SERPL-SCNC: 30 MMOL/L (ref 21–32)
CREAT SERPL-MCNC: 0.59 MG/DL (ref 0.6–1.3)
ERYTHROCYTE [DISTWIDTH] IN BLOOD BY AUTOMATED COUNT: 13.2 % (ref 11.6–15.1)
GFR SERPL CREATININE-BSD FRML MDRD: 88 ML/MIN/1.73SQ M
GLUCOSE SERPL-MCNC: 114 MG/DL (ref 65–140)
GLUCOSE SERPL-MCNC: 134 MG/DL (ref 65–140)
GLUCOSE SERPL-MCNC: 137 MG/DL (ref 65–140)
GLUCOSE SERPL-MCNC: 137 MG/DL (ref 65–140)
GLUCOSE SERPL-MCNC: 75 MG/DL (ref 65–140)
HCT VFR BLD AUTO: 40.7 % (ref 34.8–46.1)
HGB BLD-MCNC: 13 G/DL (ref 11.5–15.4)
MCH RBC QN AUTO: 30.4 PG (ref 26.8–34.3)
MCHC RBC AUTO-ENTMCNC: 31.9 G/DL (ref 31.4–37.4)
MCV RBC AUTO: 95 FL (ref 82–98)
PLATELET # BLD AUTO: 316 THOUSANDS/UL (ref 149–390)
PMV BLD AUTO: 9.1 FL (ref 8.9–12.7)
POTASSIUM SERPL-SCNC: 4.4 MMOL/L (ref 3.5–5.3)
RBC # BLD AUTO: 4.28 MILLION/UL (ref 3.81–5.12)
SODIUM SERPL-SCNC: 140 MMOL/L (ref 135–147)
WBC # BLD AUTO: 7.77 THOUSAND/UL (ref 4.31–10.16)

## 2024-01-30 PROCEDURE — 99222 1ST HOSP IP/OBS MODERATE 55: CPT | Performed by: NURSE PRACTITIONER

## 2024-01-30 PROCEDURE — 97116 GAIT TRAINING THERAPY: CPT

## 2024-01-30 PROCEDURE — 85027 COMPLETE CBC AUTOMATED: CPT | Performed by: PHYSICIAN ASSISTANT

## 2024-01-30 PROCEDURE — 82948 REAGENT STRIP/BLOOD GLUCOSE: CPT

## 2024-01-30 PROCEDURE — 97110 THERAPEUTIC EXERCISES: CPT

## 2024-01-30 PROCEDURE — NC001 PR NO CHARGE: Performed by: SURGERY

## 2024-01-30 PROCEDURE — 99223 1ST HOSP IP/OBS HIGH 75: CPT | Performed by: PHYSICIAN ASSISTANT

## 2024-01-30 PROCEDURE — 99232 SBSQ HOSP IP/OBS MODERATE 35: CPT | Performed by: INTERNAL MEDICINE

## 2024-01-30 PROCEDURE — 80048 BASIC METABOLIC PNL TOTAL CA: CPT | Performed by: PHYSICIAN ASSISTANT

## 2024-01-30 PROCEDURE — 99232 SBSQ HOSP IP/OBS MODERATE 35: CPT | Performed by: SURGERY

## 2024-01-30 RX ORDER — CHLORHEXIDINE GLUCONATE ORAL RINSE 1.2 MG/ML
15 SOLUTION DENTAL ONCE
Status: COMPLETED | OUTPATIENT
Start: 2024-01-31 | End: 2024-01-31

## 2024-01-30 RX ORDER — ACETAMINOPHEN 325 MG/1
975 TABLET ORAL EVERY 8 HOURS SCHEDULED
Status: DISCONTINUED | OUTPATIENT
Start: 2024-01-30 | End: 2024-02-09 | Stop reason: HOSPADM

## 2024-01-30 RX ORDER — METHOCARBAMOL 500 MG/1
250 TABLET, FILM COATED ORAL EVERY 8 HOURS SCHEDULED
Status: DISCONTINUED | OUTPATIENT
Start: 2024-01-30 | End: 2024-02-09 | Stop reason: HOSPADM

## 2024-01-30 RX ORDER — OXYCODONE HYDROCHLORIDE 5 MG/1
5 TABLET ORAL EVERY 4 HOURS PRN
Status: DISCONTINUED | OUTPATIENT
Start: 2024-01-30 | End: 2024-02-09 | Stop reason: HOSPADM

## 2024-01-30 RX ORDER — POLYETHYLENE GLYCOL 3350 17 G/17G
17 POWDER, FOR SOLUTION ORAL DAILY PRN
Status: DISCONTINUED | OUTPATIENT
Start: 2024-01-30 | End: 2024-02-06

## 2024-01-30 RX ORDER — HYDROMORPHONE HCL IN WATER/PF 6 MG/30 ML
0.2 PATIENT CONTROLLED ANALGESIA SYRINGE INTRAVENOUS EVERY 4 HOURS PRN
Status: DISCONTINUED | OUTPATIENT
Start: 2024-01-30 | End: 2024-02-09 | Stop reason: HOSPADM

## 2024-01-30 RX ADMIN — FLUTICASONE FUROATE AND VILANTEROL TRIFENATATE 1 PUFF: 200; 25 POWDER RESPIRATORY (INHALATION) at 08:38

## 2024-01-30 RX ADMIN — NICOTINE 1 PATCH: 21 PATCH, EXTENDED RELEASE TRANSDERMAL at 08:38

## 2024-01-30 RX ADMIN — PREGABALIN 200 MG: 100 CAPSULE ORAL at 17:06

## 2024-01-30 RX ADMIN — OXYBUTYNIN CHLORIDE 5 MG: 5 TABLET, EXTENDED RELEASE ORAL at 08:37

## 2024-01-30 RX ADMIN — CLONAZEPAM 1 MG: 1 TABLET ORAL at 17:05

## 2024-01-30 RX ADMIN — PANTOPRAZOLE SODIUM 40 MG: 40 TABLET, DELAYED RELEASE ORAL at 08:37

## 2024-01-30 RX ADMIN — INSULIN LISPRO 5 UNITS: 100 INJECTION, SOLUTION INTRAVENOUS; SUBCUTANEOUS at 08:37

## 2024-01-30 RX ADMIN — METHOCARBAMOL 250 MG: 500 TABLET ORAL at 17:05

## 2024-01-30 RX ADMIN — ATORVASTATIN CALCIUM 10 MG: 10 TABLET, FILM COATED ORAL at 17:05

## 2024-01-30 RX ADMIN — ACETAMINOPHEN 975 MG: 325 TABLET, FILM COATED ORAL at 17:06

## 2024-01-30 RX ADMIN — METHOCARBAMOL 250 MG: 500 TABLET ORAL at 21:23

## 2024-01-30 RX ADMIN — ACETAMINOPHEN 975 MG: 325 TABLET, FILM COATED ORAL at 21:23

## 2024-01-30 RX ADMIN — DOCUSATE SODIUM 100 MG: 100 CAPSULE, LIQUID FILLED ORAL at 17:06

## 2024-01-30 RX ADMIN — AMILORIDE HYDROCLORIDE 5 MG: 5 TABLET ORAL at 08:38

## 2024-01-30 RX ADMIN — DOCUSATE SODIUM 100 MG: 100 CAPSULE, LIQUID FILLED ORAL at 08:37

## 2024-01-30 RX ADMIN — PREGABALIN 200 MG: 100 CAPSULE ORAL at 21:23

## 2024-01-30 RX ADMIN — OMEGA-3 FATTY ACIDS CAP 1000 MG 1000 MG: 1000 CAP at 08:37

## 2024-01-30 RX ADMIN — INSULIN LISPRO 5 UNITS: 100 INJECTION, SOLUTION INTRAVENOUS; SUBCUTANEOUS at 12:40

## 2024-01-30 RX ADMIN — PREGABALIN 200 MG: 100 CAPSULE ORAL at 08:37

## 2024-01-30 RX ADMIN — ENOXAPARIN SODIUM 40 MG: 40 INJECTION SUBCUTANEOUS at 08:37

## 2024-01-30 RX ADMIN — ASPIRIN 81 MG: 81 TABLET, COATED ORAL at 08:37

## 2024-01-30 RX ADMIN — BUPROPION HYDROCHLORIDE 300 MG: 150 TABLET, FILM COATED, EXTENDED RELEASE ORAL at 08:37

## 2024-01-30 NOTE — PROGRESS NOTES
[facility]  Progress Note  Name: Lona Cedeno I  MRN: [id number]  Unit/Bed#: -01 I Date of Admission: 1/26/2024   Date of Service: 1/30/2024 I Hospital Day: 4    Assessment/Plan   PAD (peripheral artery disease) ([facility])  Assessment & Plan  77 y/o F w/ history of smoking, HTN, T2DM, GERD, COPD, R ankle fx s/p ORIF '08, AOID with nonhealing R foot wounds.      1/24 CTA abd runoff: dense coral reef plaque juxtarenal to mid aorta with multifocal moderate stenoses throughout bilateral iliac segments, intact b/l fem-pop and 3 vessel runoff     Plan:  - Pt will require revascularization w/ extra-anatomic bypass- Right Axillary to Femoral bypass and Right BKA planned for tomorrow 1/31  - NPO after MN  - Podiatry following, Non-salvageable right foot with forefoot OM and exposed necrotic Achilles tendon  - Continue ASA/Lipitor  - Cardiology acceptable risk  - ID following               Subjective:  Pt agreeable to planned surgery for tomorrow    Vitals:  /73   Pulse 70   Temp 97.7 °F (36.5 °C)   Resp 16   SpO2 96%     I/Os:  No intake/output data recorded.  No intake/output data recorded.    Lab Results and Cultures:   Lab Results   Component Value Date    WBC 7.77 01/30/2024    HGB 13.0 01/30/2024    HCT 40.7 01/30/2024    MCV 95 01/30/2024     01/30/2024     Lab Results   Component Value Date    CALCIUM 9.6 01/30/2024     05/21/2018    K 4.4 01/30/2024    CO2 30 01/30/2024     01/30/2024    BUN 12 01/30/2024    CREATININE 0.59 (L) 01/30/2024     Lab Results   Component Value Date    INR 1.03 01/23/2024    INR 1.08 08/11/2023    PROTIME 13.6 01/23/2024    PROTIME 14.1 08/11/2023        Blood Culture:   Lab Results   Component Value Date    BLOODCX No Growth After 5 Days. 01/23/2024   ,   Urinalysis:   Lab Results   Component Value Date    COLORU Yellow 11/20/2023    COLORU YELLOW 05/21/2018    CLARITYU Clear 11/20/2023    CLARITYU CLEAR 05/21/2018     SPECGRAV 1.010 11/20/2023    SPECGRAV 1.015 05/21/2018    PHUR 5.0 11/20/2023    PHUR 6.0 05/21/2018    LEUKOCYTESUR Negative 11/20/2023    LEUKOCYTESUR NEGATIVE 05/21/2018    NITRITE Negative 11/20/2023    NITRITE NEGATIVE 05/21/2018    PROTEINUA NEGATIVE 05/21/2018    GLUCOSEU Negative 11/20/2023    GLUCOSEU NEGATIVE 05/21/2018    KETONESU Negative 11/20/2023    KETONESU NEGATIVE 05/21/2018    BILIRUBINUR Negative 11/20/2023    BILIRUBINUR NEGATIVE 05/21/2018    BLOODU Negative 11/20/2023    BLOODU NEGATIVE 05/21/2018   ,   Urine Culture:   Lab Results   Component Value Date    URINECX No Growth <1000 cfu/mL 11/20/2023   ,   Wound Culure:   Lab Results   Component Value Date    WOUNDCULT 1+ Growth of 01/23/2024       Medications:  Current Facility-Administered Medications   Medication Dose Route Frequency    acetaminophen (TYLENOL) tablet 650 mg  650 mg Oral Q4H PRN    albuterol (PROVENTIL HFA,VENTOLIN HFA) inhaler 2 puff  2 puff Inhalation Q6H PRN    AMILoride tablet 5 mg  5 mg Oral Daily    aspirin (ECOTRIN LOW STRENGTH) EC tablet 81 mg  81 mg Oral Daily    atorvastatin (LIPITOR) tablet 10 mg  10 mg Oral Daily With Dinner    buPROPion (WELLBUTRIN XL) 24 hr tablet 300 mg  300 mg Oral Daily    clonazePAM (KlonoPIN) tablet 1 mg  1 mg Oral QPM    docusate sodium (COLACE) capsule 100 mg  100 mg Oral BID    enoxaparin (LOVENOX) subcutaneous injection 40 mg  40 mg Subcutaneous Daily    fish oil capsule 1,000 mg  1,000 mg Oral Daily    fluticasone-vilanterol 200-25 mcg/actuation 1 puff  1 puff Inhalation Daily    heparin (porcine) 2,000 Units, papaverine 60 mg in multi-electrolyte (PLASMALYTE-A/ISOLYTE-S PH 7.4) 500 mL irrigation   Irrigation Once    insulin lispro (HumaLOG) 100 units/mL subcutaneous injection 1-5 Units  1-5 Units Subcutaneous TID AC    insulin lispro (HumaLOG) 100 units/mL subcutaneous injection 1-5 Units  1-5 Units Subcutaneous HS    insulin lispro (HumaLOG) 100 units/mL subcutaneous injection 5 Units   5 Units Subcutaneous TID With Meals    nicotine (NICODERM CQ) 21 mg/24 hr TD 24 hr patch 1 patch  1 patch Transdermal Daily    ondansetron (ZOFRAN) injection 4 mg  4 mg Intravenous Q6H PRN    oxybutynin (DITROPAN-XL) 24 hr tablet 5 mg  5 mg Oral Daily    pantoprazole (PROTONIX) EC tablet 40 mg  40 mg Oral Early Morning    pregabalin (LYRICA) capsule 200 mg  200 mg Oral TID    senna (SENOKOT) tablet 8.6 mg  1 tablet Oral HS         Physical Exam:    General appearance: alert and oriented, in no acute distress  Lungs: clear to auscultation bilaterally  Heart: regular rate and rhythm, S1, S2 normal, no murmur, click, rub or gallop  Abdomen: soft, non-tender; bowel sounds normal; no masses,  no organomegaly  Extremities:  Right foot dressing intact and dry, M/S intact      Shaneka Ortega PA-C  1/30/2024

## 2024-01-30 NOTE — PLAN OF CARE
Problem: PHYSICAL THERAPY ADULT  Goal: Performs mobility at highest level of function for planned discharge setting.  See evaluation for individualized goals.  Description: Treatment/Interventions: Functional transfer training, Therapeutic exercise, Endurance training, Gait training, Bed mobility, Equipment eval/education, Elevations  Equipment Recommended: Walker       See flowsheet documentation for full assessment, interventions and recommendations.  Outcome: Progressing  Note: Prognosis: Fair  Problem List: Decreased strength, Decreased range of motion, Decreased endurance, Impaired balance, Decreased mobility, Decreased safety awareness, Impaired judgement, Orthopedic restrictions  Assessment: Pt seen for PT treatment session this date with interventions consisting of gait training to normalize gait pattern to decrease fall risk and therapeutic exercise to improve strength to improve functional mobility. Pt agreeable to PT treatment session upon arrival, pt found  sitting EOB , in no apparent distress. Since previous session, pt has made good progress as evidenced by increased distance ambulated  Barriers during this session include fatigue.  Pt continues to be functioning below baseline level, and remains limited 2* factors listed above and including impaired activity tolerance, impaired  balance, decreased endurance, decreased mobility, and WBS .  Pt prognosis for achieving goals is fair, pending pt progress with hospitalization/medical status improvements, and indicated by motivated to participate in therapy, improvement with mobility status, and continued required assistance. PT will continue to see pt during current hospitalization in order to address the deficits listed above and provide interventions consistent w/ POC in effort to achieve goals. Current goals and POC remain appropriate, pt continues to have rehab potential  Upon conclusion pt  sitting EOB . The patient's AM-PAC Basic Mobility Inpatient  Short Form Raw Score is 18.  Based on patient presentations and impairments, pt would most appropriately benefit from Level 2 resource intensity upon discharge.  Please also refer to the recommendation of the Physical Therapist for safe discharge planning. RN verbalized pt appropriate for PT session.  Barriers to Discharge: Inaccessible home environment, Decreased caregiver support     Rehab Resource Intensity Level, PT: II (Moderate Resource Intensity)    See flowsheet documentation for full assessment.

## 2024-01-30 NOTE — ASSESSMENT & PLAN NOTE
ASA and Lipitor  LDL 80  Vascular consulted for potential bypass surgery with wounds as above   Cardio at [facility] cleared for bypass

## 2024-01-30 NOTE — CONSULTS
Consultation - Acute Pain Service  Lona Cedeno 78 y.o. female MRN: [id number]  Unit/Bed#: -01 Encounter: [id number]               Lona Cedeno is a 78 y.o. female with past medical history of severe PAD now with nonhealing right foot wounds.  Scheduled for right ax fem bypass and possible right BKA tomorrow.    Tobacco use disorder  Assessment & Plan  Smokers have been shown to require higher doses of opioid pain medication and are more likely to develop chronic pain.  Encourage smoking cessation.      COPD, severity to be determined (HCC)  Assessment & Plan  Patients with underlying pulmonary disease are at higher risk for respiratory compromise with opioid pain medications.  Monitor respiratory status closely and minimize use of opioids as possible.      PAD (peripheral artery disease) (HCC)  Assessment & Plan  Patient scheduled for right axillofemoral bypass and likely right BKA tomorrow.  Patient without current pain and denies any history of right lower extremity pain.  Takes Lyrica 200 mg p.o. 3 times daily for mild chronic back pain prior to admission.  In anticipation of postoperative pain and to decrease the risk of phantom pain postoperatively, suggest the following:  Change Tylenol to 975 mg p.o. every 8 hours scheduled.  Continue Lyrica 200 mg p.o. 3 times daily.  Start Robaxin 250 mg p.o. every 8 hours scheduled.  Will avoid initiation of Cymbalta or Effexor or as patient is on higher dose of Wellbutrin.  Oxycodone 2.5 mg p.o. every 4 hours as needed moderate pain or 5 mg p.o. every 4 hours as needed severe pain (can start postoperatively).  Dilaudid 0.2 mg IV every 4 hours as needed breakthrough pain (can start postoperatively).  Continue bowel regimen to avoid opioid-induced constipation while on opioid pain medication.  Suggest peripheral nerve block preoperatively for intraoperative and postoperative pain management.  Add Narcan as needed in setting of anticipated use of opioid pain  "medication with ordered benzodiazepine.    Anxiety and depression  Assessment & Plan  Patients with depression and/or anxiety have more perceived pain on average and often require higher doses of opioid pain medication.  Treat depression and/or anxiety aggressively.          APS will continue to follow. Please contact Acute Pain Service - via [employer] from [phone] with additional questions or concerns. See STEARCLE[facility]rimma or Jordana for additional contacts and after hours information.     History of Present Illness    Admit Date:  1/26/2024  Hospital Day:  4 days  Primary Service:  Hospitalist  Attending Provider:  Ming Buchanan DO  Physician Requesting Consult: Ming Buchanan DO  Reason for Consult / Principal Problem: Planned right BKA  HPI: Lona Cedeno is a 78 y.o. year old female who presents with nonhealing right lower extremity wound felt to be nonsalvageable in setting of severe PAD.  As by vascular surgery to see this patient preoperatively in order to establish good pain control postoperatively and help prevent or mitigate potential for phantom pain.  Patient is scheduled for right axillary to femoral bypass with likely right BKA tomorrow.  Currently, patient denies any pain in her right lower extremity or in any other location.  Patient denies having had any extremity pain at all during her course.  Patient is on Lyrica 200 mg p.o. 3 times daily for mild intermittent back pain and states that it is also for sleep and \"nerves\".    I discussed with the patient the role of opioids in pain management including frequency of administration and dosing. Educated patient on potential side effects such as sedation, dizziness, nausea, vomiting, constipation, physical dependence, tolerance, addiction, and respiratory depression. The patient verbalized understanding, and all questions and concerns were addressed and answered to the patient/family's satisfaction.  Educated the patient on using stool softeners and laxatives " as part of the pain management regimen as constipation is one of the most common side effects of opioid therapy. Included in the discussion were dietary options high in fiber such as yogurts, oatmeal, prune juice, and most fruit. The patient verbalized understanding, and all questions and concerns were addressed and answered to the patient/family's satisfaction.  Informed patient that naloxone maybe required to reverse the adverse effects of opioids in the event of opioid overdose resulting in respiratory depression or unresponsiveness. The side effects of naloxone may include but are not limited to; feeling nervous, restless, or irritable, possible body aches, dizziness or weakness, diarrhea, stomach pain, or nausea. The patient verbalized understanding. All questions and concerns were addressed and answered to the patient/family's satisfaction.  The patient is currently prescribed a combination of opioids and benzodiazepines for pain control. We discussed the rationale for using such medications together, the benefits of this blend in acute pain as well as the risks involved. Informed the patient that both medications are central nervous system depressants and are known to cause respiratory depression, sedation and can be addictive. Advised the patient should they experience symptoms of unusual dizziness or lightheadedness, extreme sleepiness, or slowed or difficult breathing they should notify a staff member immediately. The patient verbalized understanding. All questions and concerns were addressed and answered to the patient/family's satisfaction.      Current pain location(s): Pain Score: 0  Pain Location/Orientation: Orientation: Right, Location: Leg  Pain Scale: Pain Assessment Tool: 0-10  Current Analgesic regimen:  Tylenol 650 mg p.o. every 4 hours as needed mild pain.  Lyrica 200 mg p.o. 3 times daily.    Pain History: Mild chronic lower back pain  Pain Management Physician: None    I have reviewed the  patient's controlled substance dispensing history in the Prescription Drug Monitoring Program in compliance with the [facility] regulations before prescribing any controlled substances.     Inpatient consult to Acute Pain Service  Consult performed by: Ricardo Stark PA-C  Consult ordered by: Felipa Foy PA-C          Review of Systems   Musculoskeletal:  Positive for back pain (Mild intermittent, none currently.).   All other systems reviewed and are negative.      Historical Information   Past Medical History:   Diagnosis Date    Anxiety     Closed fracture of coccyx (HCC) 2023    Diabetes mellitus (HCC)     GERD (gastroesophageal reflux disease)     Hyperlipidemia     Hypertension     IBS (irritable bowel syndrome)     Shoulder fracture      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;  Surgeon: Cristian Child MD;  Location: [facility];  Service: Urology    FL RETROGRADE PYELOGRAM  2023    HERNIA REPAIR      umbilicus w/ mesh     Social History   Social History     Substance and Sexual Activity   Alcohol Use Not Currently     Social History     Substance and Sexual Activity   Drug Use Never     Social History     Tobacco Use   Smoking Status Every Day    Current packs/day: 1.00    Average packs/day: 1 pack/day for 63.1 years (63.1 ttl pk-yrs)    Types: Cigarettes    Start date:    Smokeless Tobacco Never   Tobacco Comments    23 smokes about 3/4 PPD     Family History:   Family History   Problem Relation Age of Onset     COPD Mother     Emphysema Mother     COPD Father     Emphysema Father        Meds/Allergies   all current active meds have been reviewed, current meds:   Current Facility-Administered Medications   Medication Dose Route Frequency    acetaminophen (TYLENOL) tablet 650 mg  650 mg Oral Q4H PRN    albuterol (PROVENTIL HFA,VENTOLIN HFA) inhaler 2 puff  2 puff Inhalation Q6H PRN    AMILoride tablet 5 mg  5 mg Oral Daily    aspirin (ECOTRIN LOW STRENGTH) EC tablet 81 mg  81 mg Oral Daily    atorvastatin (LIPITOR) tablet 10 mg  10 mg Oral Daily With Dinner    buPROPion (WELLBUTRIN XL) 24 hr tablet 300 mg  300 mg Oral Daily    [START ON 1/31/2024] chlorhexidine (PERIDEX) 0.12 % oral rinse 15 mL  15 mL Swish & Spit Once    clonazePAM (KlonoPIN) tablet 1 mg  1 mg Oral QPM    docusate sodium (COLACE) capsule 100 mg  100 mg Oral BID    enoxaparin (LOVENOX) subcutaneous injection 40 mg  40 mg Subcutaneous Daily    fish oil capsule 1,000 mg  1,000 mg Oral Daily    fluticasone-vilanterol 200-25 mcg/actuation 1 puff  1 puff Inhalation Daily    heparin (porcine) 2,000 Units, papaverine 60 mg in multi-electrolyte (PLASMALYTE-A/ISOLYTE-S PH 7.4) 500 mL irrigation   Irrigation Once    insulin lispro (HumaLOG) 100 units/mL subcutaneous injection 1-5 Units  1-5 Units Subcutaneous TID AC    insulin lispro (HumaLOG) 100 units/mL subcutaneous injection 1-5 Units  1-5 Units Subcutaneous HS    insulin lispro (HumaLOG) 100 units/mL subcutaneous injection 5 Units  5 Units Subcutaneous TID With Meals    nicotine (NICODERM CQ) 21 mg/24 hr TD 24 hr patch 1 patch  1 patch Transdermal Daily    ondansetron (ZOFRAN) injection 4 mg  4 mg Intravenous Q6H PRN    oxybutynin (DITROPAN-XL) 24 hr tablet 5 mg  5 mg Oral Daily    pantoprazole (PROTONIX) EC tablet 40 mg  40 mg Oral Early Morning    pregabalin (LYRICA) capsule 200 mg  200 mg Oral TID    senna (SENOKOT) tablet 8.6 mg  1 tablet Oral HS   , and PTA meds:   Prior to Admission Medications    Prescriptions Last Dose Informant Patient Reported? Taking?   AMILoride 5 mg tablet   No No   Sig: Take 1 tablet (5 mg total) by mouth daily   Apoaequorin (Prevagen) 10 MG CAPS   Yes No   Sig: Take by mouth   Aspirin (ASPIR-81 PO) 2024 Self Yes Yes   Sig: take one by mouth daily   Blood Glucose Monitoring Suppl (ONETOUCH VERIO) w/Device KIT  Self No No   Sig: by Does not apply route 2 (two) times a day   Fluticasone-Salmeterol (Advair Diskus) 250-50 mcg/dose inhaler   No No   Sig: Inhale 1 puff 2 (two) times a day Rinse mouth after use.   Januvia 100 MG tablet   No No   Sig: Take 1 tablet (100 mg total) by mouth daily   Lancets (OneTouch Delica Plus [id number]) MISC   No No   Sig: TEST TWO TIMES A DAY   Misc. Devices (Carex Coccyx Cushion) MISC   No No   Sig: Use in the morning   Omega-3 Fatty Acids (FISH OIL PO)  Self Yes No   acetaminophen (TYLENOL) 325 mg tablet  Self Yes No   Sig: Take 650 mg by mouth every 6 (six) hours as needed for mild pain   albuterol (Ventolin HFA) 90 mcg/act inhaler   No No   Sig: Inhale 2 puffs every 6 (six) hours as needed for wheezing   atorvastatin (LIPITOR) 10 mg tablet   No No   Sig: Take 1 tablet (10 mg total) by mouth daily with dinner   buPROPion (WELLBUTRIN XL) 300 mg 24 hr tablet   No No   Sig: Take 1 tablet (300 mg total) by mouth daily   clonazePAM (KlonoPIN) 1 mg tablet   No No   Sig: Take 1 tablet (1 mg total) by mouth every evening   glucose blood (OneTouch Verio) test strip   No No   Sig: TEST BLOOD GLUCOSE TWO TIMES A DAY   lisinopril (ZESTRIL) 5 mg tablet   No No   Sig: Take 1 tablet (5 mg total) by mouth daily   metFORMIN (GLUCOPHAGE-XR) 500 mg 24 hr tablet   No No   Sig: Take 2 tablets (1,000 mg total) by mouth 2 (two) times a day   pantoprazole (PROTONIX) 40 mg tablet   Yes No   Si mg   potassium chloride (K-DUR,KLOR-CON) 10 mEq tablet   No No   Sig: Take 1 tablet (10 mEq total) by mouth 2 (two) times a day   pregabalin (LYRICA) 200 MG capsule   No No    Sig: TAKE ONE CAPSULE BY MOUTH THREE TIMES A DAY   solifenacin (VESICARE) 5 mg tablet   No No   Sig: Take 1 tablet (5 mg total) by mouth daily      Facility-Administered Medications: None       Allergies   Allergen Reactions    Paroxetine Other (See Comments)     Became suicidal    Adhesive [Medical Tape] Itching and Blisters    Carafate [Sucralfate] Other (See Comments)     Mouth sores, tongue sore    Sulfa Antibiotics Hives and Rash    Sulfamethoxazole-Trimethoprim Hives       Objective   Vitals:    01/29/24 0737 01/29/24 1602 01/29/24 2239 01/30/24 0710   BP: 109/65 110/69 (!) 128/106 150/73   Pulse: 83 77 79 70   Resp: 16 16  16   Temp: (!) 97.4 °F (36.3 °C) 97.9 °F (36.6 °C) 97.8 °F (36.6 °C) 97.7 °F (36.5 °C)   SpO2: 92% 92% 91% 96%       No intake or output data in the 24 hours ending 01/30/24 1357    Physical Exam  Vitals and nursing note reviewed.   Constitutional:       General: She is awake. She is not in acute distress.     Appearance: She is not ill-appearing, toxic-appearing or diaphoretic.   Cardiovascular:      Rate and Rhythm: Normal rate and regular rhythm.   Skin:     General: Skin is warm and dry.   Neurological:      Mental Status: She is alert and oriented to person, place, and time.      GCS: GCS eye subscore is 4. GCS verbal subscore is 5. GCS motor subscore is 6.   Psychiatric:         Attention and Perception: Attention normal.         Speech: Speech normal.         Behavior: Behavior normal. Behavior is cooperative.           Lab Results:  Estimated Creatinine Clearance: 69.5 mL/min (A) (by C-G formula based on SCr of 0.59 mg/dL (L)).  Lab Results   Component Value Date    WBC 7.77 01/30/2024    WBC 9.0 05/21/2018    HGB 13.0 01/30/2024    HGB 16.8 (H) 05/21/2018    HCT 40.7 01/30/2024    HCT 50.1 (H) 05/21/2018     01/30/2024     05/21/2018         Component Value Date/Time     05/21/2018 1032    K 4.4 01/30/2024 0525    K 4.0 05/21/2018 1032     01/30/2024  0525    CL 95 (L) 05/21/2018 1032    CO2 30 01/30/2024 0525    CO2 33 (H) 05/21/2018 1032    BUN 12 01/30/2024 0525    BUN 15 05/21/2018 1032    CREATININE 0.59 (L) 01/30/2024 0525    CREATININE 0.62 05/21/2018 1032         Component Value Date/Time    CALCIUM 9.6 01/30/2024 0525    CALCIUM 10.0 05/21/2018 1032    ALKPHOS 64 01/23/2024 1940    ALKPHOS 49 (L) 05/21/2018 1032    AST 10 (L) 01/23/2024 1940    AST 17 05/21/2018 1032    ALT 7 01/23/2024 1940    ALT 11 05/21/2018 1032    BILITOT 0.4 05/21/2018 1032    TP 7.3 01/23/2024 1940    ALB 4.2 01/23/2024 1940    ALB 4.4 05/21/2018 1032    ALB 4.3 05/21/2018 1032       Imaging Studies/EKG: I have personally reviewed pertinent reports.      Counseling / Coordination of Care  Total floor / unit time spent today 43 minutes. Greater than 50% of total time was spent with the patient and / or family counseling and / or coordination of care. A description of the counseling / coordination of care: Patient interview, physical examination, review of medical records, review of imaging and laboratory data, development of pain management plan, discussion of pain management plan with patient and primary service.    Please note that the APS provides consultative services regarding pain management only.  With the exception of ketamine and epidural infusions and except when indicated, final decisions regarding starting or changing doses of analgesic medications are at the discretion of the consulting service.  Ricardo Elmore PA-C  Acute Pain Service

## 2024-01-30 NOTE — ASSESSMENT & PLAN NOTE
Smokers have been shown to require higher doses of opioid pain medication and are more likely to develop chronic pain.  Encourage smoking cessation.

## 2024-01-30 NOTE — PHYSICAL THERAPY NOTE
PT Treatment Note    NAME:  Lona Cedeno  DATE: 01/30/24    AGE:   78 y.o.  Mrn:   [id number]  ADMIT DX:  Diabetic foot ulcer with osteomyelitis (HCC) [E11.621, E11.69, L97.509, M86.9]  Performed at least 2 patient identifiers during session: Name and ID bracelet       01/30/24 0843   PT Last Visit   PT Visit Date 01/30/24   Note Type   Note Type Treatment   Pain Assessment   Pain Assessment Tool 0-10   Pain Score No Pain   Restrictions/Precautions   Weight Bearing Precautions Per Order No   RLE Weight Bearing Per Order NWB   Other Precautions WBS;Fall Risk;O2  (pt given O2 tubing extension)   General   Chart Reviewed Yes   Response to Previous Treatment Patient with no complaints from previous session.   Family/Caregiver Present No   Cognition   Overall Cognitive Status WFL   Arousal/Participation Alert   Attention Within functional limits   Orientation Level Oriented X4   Memory Within functional limits   Following Commands Follows all commands and directions without difficulty   Transfers   Sit to Stand 4  Minimal assistance   Additional items Assist x 1;Verbal cues;Increased time required   Stand to Sit 4  Minimal assistance   Additional items Assist x 1;Verbal cues;Increased time required   Additional Comments pt denied dizziness with transitional movement   Ambulation/Elevation   Gait pattern   (hopped on LLE; had LOB that she was able to correct with increased UE support)   Gait Assistance 4  Minimal assist   Additional items Assist x 1;Verbal cues   Assistive Device Rolling walker   Distance 6 ft x 2   Balance   Static Sitting Normal   Dynamic Sitting Good   Static Standing Fair -   Dynamic Standing Poor +   Ambulatory Poor +   Endurance Deficit   Endurance Deficit Yes   Endurance Deficit Description fatigue   Activity Tolerance   Activity Tolerance Patient limited by fatigue   Exercises   Glute Sets Sitting;15 reps;AROM;Bilateral   Hip Flexion Sitting;15 reps;AROM;Bilateral   Hip Abduction Sitting;15  reps;AROM;Bilateral   Hip Adduction Sitting;15 reps;AROM;Bilateral   Knee AROM Long Arc Quad Sitting;15 reps;AROM;Bilateral   Ankle Pumps Sitting;15 reps;AROM;Bilateral   Assessment   Prognosis Fair   Assessment Pt seen for PT treatment session this date with interventions consisting of gait training to normalize gait pattern to decrease fall risk and therapeutic exercise to improve strength to improve functional mobility. Pt agreeable to PT treatment session upon arrival, pt found  sitting EOB , in no apparent distress. Since previous session, pt has made good progress as evidenced by increased distance ambulated  Barriers during this session include fatigue.  Pt continues to be functioning below baseline level, and remains limited 2* factors listed above and including impaired activity tolerance, impaired  balance, decreased endurance, decreased mobility, and WBS .  Pt prognosis for achieving goals is fair, pending pt progress with hospitalization/medical status improvements, and indicated by motivated to participate in therapy, improvement with mobility status, and continued required assistance. PT will continue to see pt during current hospitalization in order to address the deficits listed above and provide interventions consistent w/ POC in effort to achieve goals. Current goals and POC remain appropriate, pt continues to have rehab potential  Upon conclusion pt  sitting EOB . The patient's AM-PAC Basic Mobility Inpatient Short Form Raw Score is 18.  Based on patient presentations and impairments, pt would most appropriately benefit from Level 2 resource intensity upon discharge.  Please also refer to the recommendation of the Physical Therapist for safe discharge planning. RN verbalized pt appropriate for PT session.   Barriers to Discharge Inaccessible home environment;Decreased caregiver support   Goals   Patient Goals to get her surgery tomorrow   LTG Expiration Date 02/06/24   PT Treatment Day 1   Plan    Treatment/Interventions Functional transfer training;LE strengthening/ROM;Therapeutic exercise;Endurance training;Gait training;Equipment eval/education   Progress Progressing toward goals   PT Frequency 3-5x/wk   Discharge Recommendation   Rehab Resource Intensity Level, PT II (Moderate Resource Intensity)   Equipment Recommended Walker   Walker Package Recommended Wheeled walker   AM-PAC Basic Mobility Inpatient   Turning in Flat Bed Without Bedrails 4   Lying on Back to Sitting on Edge of Flat Bed Without Bedrails 4   Moving Bed to Chair 3   Standing Up From Chair Using Arms 3   Walk in Room 3   Climb 3-5 Stairs With Railing 1   Basic Mobility Inpatient Raw Score 18   Basic Mobility Standardized Score 41.05   Highest Level Of Mobility   JH-HLM Goal 6: Walk 10 steps or more   JH-HLM Achieved 6: Walk 10 steps or more       Time In: 0819  Time Out: 0843  Total Treatment Minutes: 24    Thalia Cassidy, PT

## 2024-01-30 NOTE — CONSULTS
PHYSICAL MEDICINE AND REHABILITATION CONSULT NOTE  Lona Cedeno 78 y.o. female MRN: [id number]  Unit/Bed#: -01 Encounter: [id number]    Requested by (Physician/Service): Ming Buchanan DO  Reason for Consultation:  Assessment of rehabilitation needs    Assessment:  Rehabilitation Diagnosis:   Right foot osteomyelitis pending BKA  Impaired mobility and self care    Recommendations:  Rehabilitation Plan:  Continue PT/OT (SLP) while on acute care. She will need re-evaluation post surgery.   The patient will likely be a candidate for acute inpatient rehabilitation once medically cleared and able to tolerate. She is pending BKA 1/31/2024. It was discussed with the patient that she will likely need a wheelchair as it will be a few months until she will be able to have a prosthetic depending on how she heals. Recommend follow up as an outpatient with PM&R in amputee clinic.       Medical Co-morbidities Plan:  Diabetes   Neuropathy  PAD   COPD  Hypertension  Hyperlipidemia   Tobacco abuse  Anxiety/depression   Bowel plan: LBM 1-24-24  Bladder plan: incontinent   DVT ppx: Lovenox and SCD    Thank you for this consultation.  Do not hesitate to contact service with further questions.      ROXANE Franco  PM&R    I have spent a total time of 30 minutes on 01/30/24 in caring for this patient including Patient and family education, Counseling / Coordination of care, Documenting in the medical record, Reviewing / ordering tests, medicine, procedures  , Obtaining or reviewing history  , and Communicating with other healthcare professionals .        History of Present Illness:  Lona Cedeno is a 78 y.o. female with a PMH of PAD, COPD, neuropathy, HTN, HLD, tobacco abuse, anxiety, depression, bladder neoplasm and diabetes who presented to the [facility] on 1/23/2024 from podiatry office with diabetic ulcer of the right toe and right ankle wound with achilles tendon exposed. She was found  to have osteomyelitis of the right foot and was transferred to [place] for possible bypass. She will undergo extra-anatomic bypass right axillary to femoral bypass and right BKA on 1/31/2024. PM&R are consulted for rehabilitation recommendations.    The patient was seen in her room. She currently denies any pain. She is anxious about surgery and the pain that she may have following the procedure. She reports that her spouse has Parkinson's and can not assist her. Her daughter lives with them and has intellectual delay but is able to help out by doing laundry and putting pellets in the stove.     Review of Systems: 10 point ROS negative except for what is noted in HPI    Function:  Prior level of function and living situation: The patient lives with her spouse and daughter who has intellectual delay, spouse has Parkinson's and is legally blind. She has an additional daughter who lives nearby and can assist if needed. They live in a ranch style home with 5 PATO.       Current level of function:  Physical Therapy: supervision for bed mobility, minimal assist for transfers and ambulation.   Occupational Therapy: Independent for eating, grooming, supervision for UB bathing/dressing, minimal assist for LB bathing/dressing and toileting     Physical Exam:  /73   Pulse 70   Temp 97.7 °F (36.5 °C)   Resp 16   SpO2 96%      No intake or output data in the 24 hours ending 01/30/24 1205    There is no height or weight on file to calculate BMI.      Physical Exam  Constitutional:       General: She is not in acute distress.     Appearance: She is not toxic-appearing.   HENT:      Head: Normocephalic and atraumatic.      Right Ear: External ear normal.      Left Ear: External ear normal.      Nose: Nose normal.      Mouth/Throat:      Mouth: Mucous membranes are moist.      Pharynx: Oropharynx is clear.   Eyes:      Extraocular Movements: Extraocular movements intact.   Pulmonary:      Effort: Pulmonary effort is  normal. No respiratory distress.   Abdominal:      General: There is no distension.   Musculoskeletal:         General: Normal range of motion.      Comments: Right ankle with dressing in place    Skin:     General: Skin is warm and dry.   Neurological:      Mental Status: She is alert and oriented to person, place, and time.   Psychiatric:         Mood and Affect: Mood normal.            Social History:    Social History     Socioeconomic History    Marital status: /Civil Union     Spouse name: Not on file    Number of children: Not on file    Years of education: Not on file    Highest education level: Not on file   Occupational History    Not on file   Tobacco Use    Smoking status: Every Day     Current packs/day: 1.00     Average packs/day: 1 pack/day for 63.1 years (63.1 ttl pk-yrs)     Types: Cigarettes     Start date: 1961    Smokeless tobacco: Never    Tobacco comments:     11/14/23 smokes about 3/4 PPD   Vaping Use    Vaping status: Never Used   Substance and Sexual Activity    Alcohol use: Not Currently    Drug use: Never    Sexual activity: Not on file   Other Topics Concern    Not on file   Social History Narrative    Not on file     Social Determinants of Health     Financial Resource Strain: Low Risk  (2/9/2023)    Overall Financial Resource Strain (CARDIA)     Difficulty of Paying Living Expenses: Not hard at all   Food Insecurity: No Food Insecurity (1/24/2024)    Hunger Vital Sign     Worried About Running Out of Food in the Last Year: Never true     Ran Out of Food in the Last Year: Never true   Transportation Needs: No Transportation Needs (1/24/2024)    PRAPARE - Transportation     Lack of Transportation (Medical): No     Lack of Transportation (Non-Medical): No   Physical Activity: Not on file   Stress: Not on file   Social Connections: Not on file   Intimate Partner Violence: Not on file   Housing Stability: Low Risk  (1/24/2024)    Housing Stability Vital Sign     Unable to Pay for  Housing in the Last Year: No     Number of Places Lived in the Last Year: 1     Unstable Housing in the Last Year: No        Family History:    Family History   Problem Relation Age of Onset    COPD Mother     Emphysema Mother     COPD Father     Emphysema Father          Medications:     Current Facility-Administered Medications:     acetaminophen (TYLENOL) tablet 650 mg, 650 mg, Oral, Q4H PRN, Cristian Cerna DO    albuterol (PROVENTIL HFA,VENTOLIN HFA) inhaler 2 puff, 2 puff, Inhalation, Q6H PRN, Cristian Cerna DO    AMILoride tablet 5 mg, 5 mg, Oral, Daily, Cristian Cerna DO, 5 mg at 01/30/24 0838    aspirin (ECOTRIN LOW STRENGTH) EC tablet 81 mg, 81 mg, Oral, Daily, Cristian Cerna DO, 81 mg at 01/30/24 0837    atorvastatin (LIPITOR) tablet 10 mg, 10 mg, Oral, Daily With Dinner, Cristian Cerna DO, 10 mg at 01/29/24 1648    buPROPion (WELLBUTRIN XL) 24 hr tablet 300 mg, 300 mg, Oral, Daily, Cristian Cerna DO, 300 mg at 01/30/24 0837    [START ON 1/31/2024] chlorhexidine (PERIDEX) 0.12 % oral rinse 15 mL, 15 mL, Swish & Spit, Once, Felipa Foy PA-C    clonazePAM (KlonoPIN) tablet 1 mg, 1 mg, Oral, QPM, Cristian Cerna DO, 1 mg at 01/29/24 1703    docusate sodium (COLACE) capsule 100 mg, 100 mg, Oral, BID, Cristian Cerna DO, 100 mg at 01/30/24 0837    enoxaparin (LOVENOX) subcutaneous injection 40 mg, 40 mg, Subcutaneous, Daily, Cristian Cerna DO, 40 mg at 01/30/24 0837    fish oil capsule 1,000 mg, 1,000 mg, Oral, Daily, Cristian Cerna DO, 1,000 mg at 01/30/24 0837    fluticasone-vilanterol 200-25 mcg/actuation 1 puff, 1 puff, Inhalation, Daily, Cristian Cerna DO, 1 puff at 01/30/24 0838    heparin (porcine) 2,000 Units, papaverine 60 mg in multi-electrolyte (PLASMALYTE-A/ISOLYTE-S PH 7.4) 500 mL irrigation, , Irrigation, Once, Ana Rosa Chavez MD    insulin lispro (HumaLOG) 100 units/mL subcutaneous injection 1-5 Units, 1-5 Units, Subcutaneous, TID AC, 1 Units at 01/29/24 1223 **AND** Fingerstick Glucose  (POCT), , , TID AC, Cristian Cerna DO    insulin lispro (HumaLOG) 100 units/mL subcutaneous injection 1-5 Units, 1-5 Units, Subcutaneous, HS, Cristian Cerna DO, 1 Units at 24    insulin lispro (HumaLOG) 100 units/mL subcutaneous injection 5 Units, 5 Units, Subcutaneous, TID With Meals, Cristian Cerna DO, 5 Units at 24 0837    nicotine (NICODERM CQ) 21 mg/24 hr TD 24 hr patch 1 patch, 1 patch, Transdermal, Daily, Cristian Cerna DO, 1 patch at 24 0838    ondansetron (ZOFRAN) injection 4 mg, 4 mg, Intravenous, Q6H PRN, Cristian Cerna DO    oxybutynin (DITROPAN-XL) 24 hr tablet 5 mg, 5 mg, Oral, Daily, Cristian Cerna DO, 5 mg at 24 0837    pantoprazole (PROTONIX) EC tablet 40 mg, 40 mg, Oral, Early Morning, Cristian Cerna DO, 40 mg at 24 0837    pregabalin (LYRICA) capsule 200 mg, 200 mg, Oral, TID, Cristian Cerna DO, 200 mg at 24 0837    senna (SENOKOT) tablet 8.6 mg, 1 tablet, Oral, HS, Cristian Cerna DO, 8.6 mg at 24    Past Medical History:     Past Medical History:   Diagnosis Date    Anxiety     Closed fracture of coccyx (HCC) 2023    Diabetes mellitus (HCC)     GERD (gastroesophageal reflux disease)     Hyperlipidemia     Hypertension     IBS (irritable bowel syndrome)     Shoulder fracture         Past Surgical History:     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;  Surgeon: Cristian Child MD;  Location: [facility];  Service: Urology    FL RETROGRADE PYELOGRAM  2023    HERNIA  REPAIR      umbilicus w/ mesh         Allergies:     Allergies   Allergen Reactions    Paroxetine Other (See Comments)     Became suicidal    Adhesive [Medical Tape] Itching and Blisters    Carafate [Sucralfate] Other (See Comments)     Mouth sores, tongue sore    Sulfa Antibiotics Hives and Rash    Sulfamethoxazole-Trimethoprim Hives           LABORATORY RESULTS:      Lab Results   Component Value Date    HGB 13.0 01/30/2024    HGB 16.8 (H) 05/21/2018    HCT 40.7 01/30/2024    HCT 50.1 (H) 05/21/2018    WBC 7.77 01/30/2024    WBC 9.0 05/21/2018     Lab Results   Component Value Date    BUN 12 01/30/2024    BUN 15 05/21/2018     05/21/2018    K 4.4 01/30/2024    K 4.0 05/21/2018     01/30/2024    CL 95 (L) 05/21/2018    CREATININE 0.59 (L) 01/30/2024    CREATININE 0.62 05/21/2018     Lab Results   Component Value Date    PROTIME 13.6 01/23/2024    INR 1.03 01/23/2024        DIAGNOSTIC STUDIES: Reviewed  No results found.

## 2024-01-30 NOTE — PROGRESS NOTES
Vascular Surgery  Progress Note      Pt planned for R Ax-Fem bypass w/ possible R BKA tomorrow, Weds, 1/31, w/ Dr. Hoyos  Daughter, Rozina Feng, called & updated. All Q's answered.    --Consult PMR  --Consult APS      Felipa Guevara-Scaff  1/30/2024

## 2024-01-30 NOTE — PLAN OF CARE
Problem: Prexisting or High Potential for Compromised Skin Integrity  Goal: Skin integrity is maintained or improved  Description: INTERVENTIONS:  - Identify patients at risk for skin breakdown  - Assess and monitor skin integrity  - Assess and monitor nutrition and hydration status  - Monitor labs   - Assess for incontinence   - Turn and reposition patient  - Assist with mobility/ambulation  - Relieve pressure over bony prominences  - Avoid friction and shearing  - Provide appropriate hygiene as needed including keeping skin clean and dry  - Evaluate need for skin moisturizer/barrier cream  - Collaborate with interdisciplinary team   - Patient/family teaching  - Consider wound care consult   Outcome: Progressing     Problem: PAIN - ADULT  Goal: Verbalizes/displays adequate comfort level or baseline comfort level  Description: Interventions:  - Encourage patient to monitor pain and request assistance  - Assess pain using appropriate pain scale  - Administer analgesics based on type and severity of pain and evaluate response  - Implement non-pharmacological measures as appropriate and evaluate response  - Consider cultural and social influences on pain and pain management  - Notify physician/advanced practitioner if interventions unsuccessful or patient reports new pain  Outcome: Progressing     Problem: INFECTION - ADULT  Goal: Absence or prevention of progression during hospitalization  Description: INTERVENTIONS:  - Assess and monitor for signs and symptoms of infection  - Monitor lab/diagnostic results  - Monitor all insertion sites, i.e. indwelling lines, tubes, and drains  - Monitor endotracheal if appropriate and nasal secretions for changes in amount and color  - [place] appropriate cooling/warming therapies per order  - Administer medications as ordered  - Instruct and encourage patient and family to use good hand hygiene technique  - Identify and instruct in appropriate isolation precautions for  identified infection/condition  Outcome: Progressing  Goal: Absence of fever/infection during neutropenic period  Description: INTERVENTIONS:  - Monitor WBC    Outcome: Progressing     Problem: SAFETY ADULT  Goal: Patient will remain free of falls  Description: INTERVENTIONS:  - Educate patient/family on patient safety including physical limitations  - Instruct patient to call for assistance with activity   - Consult OT/PT to assist with strengthening/mobility   - Keep Call bell within reach  - Keep bed low and locked with side rails adjusted as appropriate  - Keep care items and personal belongings within reach  - Initiate and maintain comfort rounds  - Make Fall Risk Sign visible to staff  - Apply yellow socks and bracelet for high fall risk patients  - Consider moving patient to room near nurses station  Outcome: Progressing  Goal: Maintain or return to baseline ADL function  Description: INTERVENTIONS:  -  Assess patient's ability to carry out ADLs; assess patient's baseline for ADL function and identify physical deficits which impact ability to perform ADLs (bathing, care of mouth/teeth, toileting, grooming, dressing, etc.)  - Assess/evaluate cause of self-care deficits   - Assess range of motion  - Assess patient's mobility; develop plan if impaired  - Assess patient's need for assistive devices and provide as appropriate  - Encourage maximum independence but intervene and supervise when necessary  - Involve family in performance of ADLs  - Assess for home care needs following discharge   - Consider OT consult to assist with ADL evaluation and planning for discharge  - Provide patient education as appropriate  Outcome: Progressing  Goal: Maintains/Returns to pre admission functional level  Description: INTERVENTIONS:  - Perform AM-PAC 6 Click Basic Mobility/ Daily Activity assessment daily.  - Set and communicate daily mobility goal to care team and patient/family/caregiver.   - Collaborate with rehabilitation  services on mobility goals if consulted  - Out of bed to chair 3 times a day   - Out of bed for meals 3 times a day  - Out of bed for toileting  - Record patient progress and toleration of activity level   Outcome: Progressing     Problem: DISCHARGE PLANNING  Goal: Discharge to home or other facility with appropriate resources  Description: INTERVENTIONS:  - Identify barriers to discharge w/patient and caregiver  - Arrange for needed discharge resources and transportation as appropriate  - Identify discharge learning needs (meds, wound care, etc.)  - Arrange for interpretive services to assist at discharge as needed  - Refer to Case Management Department for coordinating discharge planning if the patient needs post-hospital services based on physician/advanced practitioner order or complex needs related to functional status, cognitive ability, or social support system  Outcome: Progressing     Problem: Knowledge Deficit  Goal: Patient/family/caregiver demonstrates understanding of disease process, treatment plan, medications, and discharge instructions  Description: Complete learning assessment and assess knowledge base.  Interventions:  - Provide teaching at level of understanding  - Provide teaching via preferred learning methods  Outcome: Progressing

## 2024-01-30 NOTE — PROGRESS NOTES
[facility]  Progress Note  Name: Lona Cedeno I  MRN: [id number]  Unit/Bed#: -01 I Date of Admission: 1/26/2024   Date of Service: 1/30/2024 I Hospital Day: 4    Assessment/Plan   Constipation  Assessment & Plan  Bowel regimen   Reports she had BM 1/29     Tobacco use disorder  Assessment & Plan  Nicotine TD    PAD (peripheral artery disease) (HCC)  Assessment & Plan  ASA and Lipitor  LDL 80  Vascular consulted for potential bypass surgery with wounds as above   Cardio at [facility] cleared for bypass    Type 2 diabetes mellitus with diabetic polyneuropathy ([facility])  Assessment & Plan  Lab Results   Component Value Date    HGBA1C 6.5 (H) 11/07/2023       Recent Labs     01/29/24  2120 01/30/24  0624 01/30/24  1153 01/30/24  1639   POCGLU 121 137 114 75         Blood Sugar Average: Last 72 hrs:  (P) [id number]  C/w Humalog AC  C/w ISS    * Diabetic foot ulcer with osteomyelitis (HCC)  Assessment & Plan  Tx to [facility] for vascular eval/ongoing podiatry eval   Will need bypass w/ vascular before podiatry takes to OR   Podiatry and vascular d/w pt and family, recommending BKA.  Procedure pending for tomorrow  Cardio provided clearance for any procedures needed  RLE NWB  ID eval at [facility] and rec to watch off abx               VTE Pharmacologic Prophylaxis: VTE Score: 4 Moderate Risk (Score 3-4) - Pharmacological DVT Prophylaxis Ordered: enoxaparin (Lovenox).    Mobility:   Basic Mobility Inpatient Raw Score: 18  JH-HLM Goal: 6: Walk 10 steps or more  JH-HLM Achieved: 6: Walk 10 steps or more  HLM Goal achieved. Continue to encourage appropriate mobility.    Patient Centered Rounds: I performed bedside rounds with nursing staff today.   Discussions with Specialists or Other Care Team Provider:     Education and Discussions with Family / Patient: Patient declined call to .     Total Time Spent on Date of Encounter in care of patient:  mins. This time was spent on one or  more of the following: performing physical exam; counseling and coordination of care; obtaining or reviewing history; documenting in the medical record; reviewing/ordering tests, medications or procedures; communicating with other healthcare professionals and discussing with patient's family/caregivers.    Current Length of Stay: 4 day(s)  Current Patient Status: Inpatient   Certification Statement: The patient will continue to require additional inpatient hospital stay due to pending bka  Discharge Plan: Anticipate discharge in >72 hrs to discharge location to be determined pending rehab evaluations.    Code Status: Level 2 - DNAR: but accepts endotracheal intubation    Subjective:   Patient denies any acute complaints    Objective:     Vitals:   Temp (24hrs), Av.9 °F (36.6 °C), Min:97.7 °F (36.5 °C), Max:98.1 °F (36.7 °C)    Temp:  [97.7 °F (36.5 °C)-98.1 °F (36.7 °C)] 98.1 °F (36.7 °C)  HR:  [70-79] 75  Resp:  [16] 16  BP: (128-150)/() 134/67  SpO2:  [91 %-96 %] 92 %  There is no height or weight on file to calculate BMI.     Input and Output Summary (last 24 hours):     Intake/Output Summary (Last 24 hours) at 2024 1704  Last data filed at 2024 1511  Gross per 24 hour   Intake --   Output 1300 ml   Net -1300 ml       Physical Exam:   Physical Exam  Vitals and nursing note reviewed.   Constitutional:       General: She is not in acute distress.     Appearance: She is well-developed. She is not toxic-appearing.   HENT:      Head: Normocephalic and atraumatic.   Eyes:      General: No scleral icterus.     Conjunctiva/sclera: Conjunctivae normal.   Cardiovascular:      Rate and Rhythm: Normal rate and regular rhythm.      Heart sounds: No murmur heard.     No friction rub. No gallop.   Pulmonary:      Effort: Pulmonary effort is normal. No respiratory distress.      Breath sounds: Normal breath sounds. No stridor. No wheezing, rhonchi or rales.   Chest:      Chest wall: No tenderness.    Abdominal:      General: There is no distension.      Palpations: Abdomen is soft. There is no mass.      Tenderness: There is no abdominal tenderness. There is no guarding or rebound.      Hernia: No hernia is present.   Musculoskeletal:         General: No swelling or tenderness.      Cervical back: Neck supple.      Comments: Right foot wrapped clean dry intact   Skin:     General: Skin is warm and dry.      Capillary Refill: Capillary refill takes less than 2 seconds.   Neurological:      Mental Status: She is alert and oriented to person, place, and time.   Psychiatric:         Mood and Affect: Mood normal.          Additional Data:     Labs:  Results from last 7 days   Lab Units 01/30/24  0525 01/28/24  0619   WBC Thousand/uL 7.77 8.05   HEMOGLOBIN g/dL 13.0 13.2   HEMATOCRIT % 40.7 40.8   PLATELETS Thousands/uL 316 332   NEUTROS PCT %  --  64   LYMPHS PCT %  --  22   MONOS PCT %  --  7   EOS PCT %  --  6     Results from last 7 days   Lab Units 01/30/24  0525 01/24/24  0505 01/23/24  1940   SODIUM mmol/L 140   < > 135   POTASSIUM mmol/L 4.4   < > 4.5   CHLORIDE mmol/L 103   < > 99   CO2 mmol/L 30   < > 25   BUN mg/dL 12   < > 12   CREATININE mg/dL 0.59*   < > 0.54*   ANION GAP mmol/L 7   < > 11   CALCIUM mg/dL 9.6   < > 10.3*   ALBUMIN g/dL  --   --  4.2   TOTAL BILIRUBIN mg/dL  --   --  0.21   ALK PHOS U/L  --   --  64   ALT U/L  --   --  7   AST U/L  --   --  10*   GLUCOSE RANDOM mg/dL 134   < > 120    < > = values in this interval not displayed.     Results from last 7 days   Lab Units 01/23/24  1940   INR  1.03     Results from last 7 days   Lab Units 01/30/24  1639 01/30/24  1153 01/30/24  0624 01/29/24  2120 01/29/24  1643 01/29/24  1101 01/29/24  0607 01/28/24  2117 01/28/24  1605 01/28/24  1044 01/28/24  0641 01/27/24  2119   POC GLUCOSE mg/dl 75 114 137 121 84 153* 152* 193* 72 151* 133 190*         Results from last 7 days   Lab Units 01/24/24  0505 01/23/24  2223 01/23/24 1940   LACTIC ACID  mmol/L  --  1.7 3.3*   PROCALCITONIN ng/ml <0.05  --  <0.05       Lines/Drains:  Invasive Devices       Peripheral Intravenous Line  Duration             Peripheral IV 01/29/24 Left;Ventral (anterior) Forearm 1 day              Drain  Duration             Ureteral Internal Stent Left ureter 6 Fr. 71 days                          Imaging: No pertinent imaging reviewed.    Recent Cultures (last 7 days):   Results from last 7 days   Lab Units 01/23/24 1952 01/23/24 1945 01/23/24 1940   BLOOD CULTURE  No Growth After 5 Days.  --  No Growth After 5 Days.   GRAM STAIN RESULT   --  No Polys or Bacteria seen  --    WOUND CULTURE   --  1+ Growth of  --        Last 24 Hours Medication List:   Current Facility-Administered Medications   Medication Dose Route Frequency Provider Last Rate    acetaminophen  975 mg Oral Q8H [facility] Felipa Foy PA-C      albuterol  2 puff Inhalation Q6H PRN Cristian Cerna DO      AMILoride  5 mg Oral Daily Cristian Cerna DO      aspirin  81 mg Oral Daily Cristian Cerna DO      atorvastatin  10 mg Oral Daily With Dinner Cristian Cerna DO      buPROPion  300 mg Oral Daily Cristian Cerna DO      [START ON 1/31/2024] chlorhexidine  15 mL Swish & Spit Once Felipa Foy PA-C      clonazePAM  1 mg Oral QPM Cristian Cerna DO      docusate sodium  100 mg Oral BID Cristian Cerna DO      enoxaparin  40 mg Subcutaneous Daily Cristian Cerna DO      fish oil  1,000 mg Oral Daily Cristian Cerna DO      fluticasone-vilanterol  1 puff Inhalation Daily Cristian Cerna DO      heparin (porcine) 2,000 Units, papaverine 60 mg in multi-electrolyte (PLASMALYTE-A/ISOLYTE-S PH 7.4) 500 mL irrigation   Irrigation Once Ana Rosa Chavez MD      HYDROmorphone  0.2 mg Intravenous Q4H PRN Felipa Foy PA-C      insulin lispro  1-5 Units Subcutaneous TID AC Cristian Cerna DO      insulin lispro  1-5 Units Subcutaneous HS Cristian Cerna DO      insulin lispro  5 Units Subcutaneous TID With Meals Cristian  Nehemiah,       methocarbamol  250 mg Oral Q8H JILLIAN Felipa Foy PA-C      nicotine  1 patch Transdermal Daily Cristian Cerna,       ondansetron  4 mg Intravenous Q6H PRN Cristian Cerna,       oxybutynin  5 mg Oral Daily Cristian Cerna,       oxyCODONE  2.5 mg Oral Q4H PRN Felipa Foy PA-C      Or    oxyCODONE  5 mg Oral Q4H PRN Felipa Foy PA-C      pantoprazole  40 mg Oral Early Morning Cristian Cerna,       polyethylene glycol  17 g Oral Daily PRN Felipa Foy PA-C      pregabalin  200 mg Oral TID Cristian Cerna DO      senna  1 tablet Oral HS Cristian Cerna DO          Today, Patient Was Seen By: Ming Buchanan DO    **Please Note: This note may have been constructed using a voice recognition system.**

## 2024-01-30 NOTE — TREATMENT PLAN
Patient seen at bedside today.  Discussed continued palliative local wound care versus right proximal amputation.    Patient is amenable to right proximal amputation with vascular surgery.  Patient is scheduled for right BKA and extra-anatomic axillary to femoral bypass tomorrow 1/31/2024.  Will continue to follow patient until after proximal amputation.  Plan to continue local wound care at this time.

## 2024-01-30 NOTE — ASSESSMENT & PLAN NOTE
79 y/o F w/ history of smoking, HTN, T2DM, GERD, COPD, R ankle fx s/p ORIF '08, AOID with nonhealing R foot wounds.      1/24 CTA abd runoff: dense [place] plaque juxtarenal to mid aorta with multifocal moderate stenoses throughout bilateral iliac segments, intact b/l fem-pop and 3 vessel runoff     Plan:  - Pt will require revascularization w/ extra-anatomic bypass- Right Axillary to Femoral bypass and Right BKA planned for tomorrow 1/31  - NPO after MN  - Podiatry following, Non-salvageable right foot with forefoot OM and exposed necrotic Achilles tendon  - Continue ASA/Lipitor  - Cardiology acceptable risk  - ID following

## 2024-01-30 NOTE — CASE MANAGEMENT
Case Management Discharge Planning Note    Patient name Lona Cedeno  Location /-01 MRN [id number]  : 1945 Date 2024       Current Admission Date: 2024  Current Admission Diagnosis:Diabetic foot ulcer with osteomyelitis (HCC)   Patient Active Problem List    Diagnosis Date Noted    Constipation 2024    Preoperative cardiovascular examination 2024    Diabetic foot ulcer with osteomyelitis (HCC) 2024    Ankle ulcer, right, with fat layer exposed (HCC) 2023    Age-related osteoporosis without current pathological fracture 2023    Abnormal CT of the chest 10/17/2023    COPD, severity to be determined ([facility]) 10/17/2023    Tobacco use disorder 10/17/2023    PAD (peripheral artery disease) ([facility]) 10/10/2023    Bladder neoplasm 2023    Left renal mass 2023    Lactic acidosis 2023    Hypomagnesemia 2023    Degenerative lumbar disc 2023    Urinary incontinence 2023    GERD without esophagitis 10/16/2019    Medicare annual wellness visit, subsequent 2019    Essential hypertension 02/15/2019    Anxiety and depression 02/15/2019    Type 2 diabetes mellitus with diabetic polyneuropathy (HCC) 2019      LOS (days): 4  Geometric Mean LOS (GMLOS) (days): 3.1  Days to GMLOS:-0.5     OBJECTIVE:  Risk of Unplanned Readmission Score: 14.33         Current admission status: Inpatient   Preferred Pharmacy:   [id number] [address] [address]  Phone: [phone] Fax: [phone]    [facility]  [place] - [address]  [address]   [place] [address]  Phone: [phone] Fax: [phone]    Primary Care Provider: Vivek Preston DO    Primary Insurance: [employer]  REP  Secondary Insurance:     DISCHARGE DETAILS:          Comments - Freedom of Choice: Not medically cleared going for a R BKA on .

## 2024-01-30 NOTE — PLAN OF CARE
Problem: Prexisting or High Potential for Compromised Skin Integrity  Goal: Skin integrity is maintained or improved  Description: INTERVENTIONS:  - Identify patients at risk for skin breakdown  - Assess and monitor skin integrity  - Assess and monitor nutrition and hydration status  - Monitor labs   - Assess for incontinence   - Turn and reposition patient  - Assist with mobility/ambulation  - Relieve pressure over bony prominences  - Avoid friction and shearing  - Provide appropriate hygiene as needed including keeping skin clean and dry  - Evaluate need for skin moisturizer/barrier cream  - Collaborate with interdisciplinary team   - Patient/family teaching  - Consider wound care consult   Outcome: Progressing     Problem: PAIN - ADULT  Goal: Verbalizes/displays adequate comfort level or baseline comfort level  Description: Interventions:  - Encourage patient to monitor pain and request assistance  - Assess pain using appropriate pain scale  - Administer analgesics based on type and severity of pain and evaluate response  - Implement non-pharmacological measures as appropriate and evaluate response  - Consider cultural and social influences on pain and pain management  - Notify physician/advanced practitioner if interventions unsuccessful or patient reports new pain  Outcome: Progressing     Problem: INFECTION - ADULT  Goal: Absence or prevention of progression during hospitalization  Description: INTERVENTIONS:  - Assess and monitor for signs and symptoms of infection  - Monitor lab/diagnostic results  - Monitor all insertion sites, i.e. indwelling lines, tubes, and drains  - Monitor endotracheal if appropriate and nasal secretions for changes in amount and color  - [place] appropriate cooling/warming therapies per order  - Administer medications as ordered  - Instruct and encourage patient and family to use good hand hygiene technique  - Identify and instruct in appropriate isolation precautions for  identified infection/condition  Outcome: Progressing  Goal: Absence of fever/infection during neutropenic period  Description: INTERVENTIONS:  - Monitor WBC    Outcome: Progressing     Problem: SAFETY ADULT  Goal: Patient will remain free of falls  Description: INTERVENTIONS:  - Educate patient/family on patient safety including physical limitations  - Instruct patient to call for assistance with activity   - Consult OT/PT to assist with strengthening/mobility   - Keep Call bell within reach  - Keep bed low and locked with side rails adjusted as appropriate  - Keep care items and personal belongings within reach  - Initiate and maintain comfort rounds  - Make Fall Risk Sign visible to staff  - Offer Toileting every    Hours, in advance of need  - Initiate/Maintain alarm  - Obtain necessary fall risk management equipment:   - Apply yellow socks and bracelet for high fall risk patients  - Consider moving patient to room near nurses station  Outcome: Progressing  Goal: Maintain or return to baseline ADL function  Description: INTERVENTIONS:  -  Assess patient's ability to carry out ADLs; assess patient's baseline for ADL function and identify physical deficits which impact ability to perform ADLs (bathing, care of mouth/teeth, toileting, grooming, dressing, etc.)  - Assess/evaluate cause of self-care deficits   - Assess range of motion  - Assess patient's mobility; develop plan if impaired  - Assess patient's need for assistive devices and provide as appropriate  - Encourage maximum independence but intervene and supervise when necessary  - Involve family in performance of ADLs  - Assess for home care needs following discharge   - Consider OT consult to assist with ADL evaluation and planning for discharge  - Provide patient education as appropriate  Outcome: Progressing  Goal: Maintains/Returns to pre admission functional level  Description: INTERVENTIONS:  - Perform AM-PAC 6 Click Basic Mobility/ Daily Activity  assessment daily.  - Set and communicate daily mobility goal to care team and patient/family/caregiver.   - Collaborate with rehabilitation services on mobility goals if consulted  - Perform Range of Motion  times a day.  - Reposition patient every  hours.  - Dangle patient times a day  - Stand patient  times a day  - Ambulate patient  times a day  - Out of bed to chair  times a day   - Out of bed for meals  times a day  - Out of bed for toileting  - Record patient progress and toleration of activity level   Outcome: Progressing     Problem: DISCHARGE PLANNING  Goal: Discharge to home or other facility with appropriate resources  Description: INTERVENTIONS:  - Identify barriers to discharge w/patient and caregiver  - Arrange for needed discharge resources and transportation as appropriate  - Identify discharge learning needs (meds, wound care, etc.)  - Arrange for interpretive services to assist at discharge as needed  - Refer to Case Management Department for coordinating discharge planning if the patient needs post-hospital services based on physician/advanced practitioner order or complex needs related to functional status, cognitive ability, or social support system  Outcome: Progressing     Problem: Knowledge Deficit  Goal: Patient/family/caregiver demonstrates understanding of disease process, treatment plan, medications, and discharge instructions  Description: Complete learning assessment and assess knowledge base.  Interventions:  - Provide teaching at level of understanding  - Provide teaching via preferred learning methods  Outcome: Progressing

## 2024-01-30 NOTE — ASSESSMENT & PLAN NOTE
Tx to [facility] for vascular eval/ongoing podiatry eval   Will need bypass w/ vascular before podiatry takes to OR   Podiatry and vascular d/w pt and family, recommending BKA.  Procedure pending for tomorrow  Cardio provided clearance for any procedures needed  RLE NWB  ID eval at [facility] and rec to watch off abx

## 2024-01-30 NOTE — ASSESSMENT & PLAN NOTE
Status post right axillobifemoral bypass on 1/31/2024 -did not require right BKA as tentatively planned.  Takes Lyrica 200 mg p.o. 3 times daily for mild chronic back pain prior to admission.  Tylenol 975 mg p.o. every 8 hours scheduled.  Lyrica 200 mg p.o. 3 times daily.  Robaxin 250 mg p.o. every 8 hours scheduled.  Oxycodone 2.5 mg p.o. every 4 hours as needed moderate pain or 5 mg p.o. every 4 hours as needed severe pain.  Dilaudid 0.2 mg IV every 4 hours as needed breakthrough pain and discontinue by 2-20 4 AM.  Continue bowel regimen to avoid opioid-induced constipation while on opioid pain medication.  Narcan as needed in setting of anticipated use of opioid pain medication with ordered benzodiazepine.  At discharge, suggest the following:  Lyrica 200 mg p.o. 3 times daily.  Tylenol 975 mg p.o. every 8 hours as needed mild pain.  Robaxin 250 mg p.o. every 8 hours as needed muscle spasm x 3 days, then discontinue.  Oxycodone 2.5 mg p.o. every 6 hours as needed severe pain x 3 days, then discontinue.  Bowel regimen to avoid opioid-induced constipation while on opioid pain medication.

## 2024-01-30 NOTE — ASSESSMENT & PLAN NOTE
Patients with depression and/or anxiety have more perceived pain on average and often require higher doses of opioid pain medication.  Treat depression and/or anxiety aggressively.

## 2024-01-30 NOTE — ASSESSMENT & PLAN NOTE
Lab Results   Component Value Date    HGBA1C 6.5 (H) 11/07/2023       Recent Labs     01/29/24  2120 01/30/24  0624 01/30/24  1153 01/30/24  1639   POCGLU 121 137 114 75         Blood Sugar Average: Last 72 hrs:  (P) [id number]  C/w Humalog AC  C/w ISS

## 2024-01-30 NOTE — ASSESSMENT & PLAN NOTE
Patients with underlying pulmonary disease are at higher risk for respiratory compromise with opioid pain medications.  Monitor respiratory status closely and minimize use of opioids as possible.

## 2024-01-31 ENCOUNTER — ANESTHESIA EVENT (INPATIENT)
Dept: PERIOP | Facility: HOSPITAL | Age: 79
DRG: 253 | End: 2024-01-31
Payer: COMMERCIAL

## 2024-01-31 ENCOUNTER — ANESTHESIA (INPATIENT)
Dept: PERIOP | Facility: HOSPITAL | Age: 79
DRG: 253 | End: 2024-01-31
Payer: COMMERCIAL

## 2024-01-31 LAB
ABO GROUP BLD BPU: NORMAL
BASE EXCESS BLDA CALC-SCNC: 0 MMOL/L (ref -2–3)
BASE EXCESS BLDA CALC-SCNC: 0 MMOL/L (ref -2–3)
BPU ID: NORMAL
CA-I BLD-SCNC: 1.31 MMOL/L (ref 1.12–1.32)
CA-I BLD-SCNC: 1.35 MMOL/L (ref 1.12–1.32)
CROSSMATCH: NORMAL
GLUCOSE SERPL-MCNC: 118 MG/DL (ref 65–140)
GLUCOSE SERPL-MCNC: 130 MG/DL (ref 65–140)
GLUCOSE SERPL-MCNC: 132 MG/DL (ref 65–140)
GLUCOSE SERPL-MCNC: 132 MG/DL (ref 65–140)
GLUCOSE SERPL-MCNC: 142 MG/DL (ref 65–140)
GLUCOSE SERPL-MCNC: 143 MG/DL (ref 65–140)
HCO3 BLDA-SCNC: 25.6 MMOL/L (ref 22–28)
HCO3 BLDA-SCNC: 26 MMOL/L (ref 22–28)
HCT VFR BLD CALC: 35 % (ref 34.8–46.1)
HCT VFR BLD CALC: 36 % (ref 34.8–46.1)
HGB BLDA-MCNC: 11.9 G/DL (ref 11.5–15.4)
HGB BLDA-MCNC: 12.2 G/DL (ref 11.5–15.4)
KCT BLD-ACNC: 211 SEC (ref 89–137)
KCT BLD-ACNC: 252 SEC (ref 89–137)
PCO2 BLD: 27 MMOL/L (ref 21–32)
PCO2 BLD: 27 MMOL/L (ref 21–32)
PCO2 BLD: 43.7 MM HG (ref 36–44)
PCO2 BLD: 46.3 MM HG (ref 36–44)
PH BLD: 7.36 [PH] (ref 7.35–7.45)
PH BLD: 7.38 [PH] (ref 7.35–7.45)
PO2 BLD: 121 MM HG (ref 75–129)
PO2 BLD: 81 MM HG (ref 75–129)
POTASSIUM BLD-SCNC: 4 MMOL/L (ref 3.5–5.3)
POTASSIUM BLD-SCNC: 4.3 MMOL/L (ref 3.5–5.3)
SAO2 % BLD FROM PO2: 96 % (ref 60–85)
SAO2 % BLD FROM PO2: 99 % (ref 60–85)
SODIUM BLD-SCNC: 139 MMOL/L (ref 136–145)
SODIUM BLD-SCNC: 140 MMOL/L (ref 136–145)
SPECIMEN SOURCE: ABNORMAL
UNIT DISPENSE STATUS: NORMAL
UNIT PRODUCT CODE: NORMAL
UNIT PRODUCT VOLUME: 300 ML
UNIT PRODUCT VOLUME: 350 ML
UNIT RH: NORMAL

## 2024-01-31 PROCEDURE — 82330 ASSAY OF CALCIUM: CPT

## 2024-01-31 PROCEDURE — 93930 UPPER EXTREMITY STUDY: CPT | Performed by: SURGERY

## 2024-01-31 PROCEDURE — 82948 REAGENT STRIP/BLOOD GLUCOSE: CPT

## 2024-01-31 PROCEDURE — 03150J6 BYPASS RIGHT AXILLARY ARTERY TO RIGHT UPPER LEG ARTERY WITH SYNTHETIC SUBSTITUTE, OPEN APPROACH: ICD-10-PCS | Performed by: SURGERY

## 2024-01-31 PROCEDURE — C1768 GRAFT, VASCULAR: HCPCS | Performed by: SURGERY

## 2024-01-31 PROCEDURE — 82803 BLOOD GASES ANY COMBINATION: CPT

## 2024-01-31 PROCEDURE — 82947 ASSAY GLUCOSE BLOOD QUANT: CPT

## 2024-01-31 PROCEDURE — 99232 SBSQ HOSP IP/OBS MODERATE 35: CPT | Performed by: INTERNAL MEDICINE

## 2024-01-31 PROCEDURE — 85347 COAGULATION TIME ACTIVATED: CPT

## 2024-01-31 PROCEDURE — 85014 HEMATOCRIT: CPT

## 2024-01-31 PROCEDURE — 84132 ASSAY OF SERUM POTASSIUM: CPT

## 2024-01-31 PROCEDURE — 84295 ASSAY OF SERUM SODIUM: CPT

## 2024-01-31 PROCEDURE — 35621 BPG AXILLARY-FEMORAL: CPT | Performed by: SURGERY

## 2024-01-31 PROCEDURE — 99233 SBSQ HOSP IP/OBS HIGH 50: CPT | Performed by: SURGERY

## 2024-01-31 DEVICE — PROPATEN VASCULAR GRAFT TW RR 8MMX80CM 70CM RINGS HEPARIN
Type: IMPLANTABLE DEVICE | Site: AXILLA | Status: FUNCTIONAL
Brand: GORE PROPATEN VASCULAR GRAFT

## 2024-01-31 RX ORDER — ONDANSETRON 2 MG/ML
4 INJECTION INTRAMUSCULAR; INTRAVENOUS ONCE AS NEEDED
Status: DISCONTINUED | OUTPATIENT
Start: 2024-01-31 | End: 2024-01-31 | Stop reason: HOSPADM

## 2024-01-31 RX ORDER — SODIUM CHLORIDE 9 MG/ML
75 INJECTION, SOLUTION INTRAVENOUS CONTINUOUS
Status: DISCONTINUED | OUTPATIENT
Start: 2024-01-31 | End: 2024-02-01

## 2024-01-31 RX ORDER — OXYCODONE HYDROCHLORIDE 5 MG/1
5 TABLET ORAL EVERY 4 HOURS PRN
Status: DISCONTINUED | OUTPATIENT
Start: 2024-01-31 | End: 2024-01-31

## 2024-01-31 RX ORDER — HEPARIN SODIUM 1000 [USP'U]/ML
INJECTION, SOLUTION INTRAVENOUS; SUBCUTANEOUS AS NEEDED
Status: DISCONTINUED | OUTPATIENT
Start: 2024-01-31 | End: 2024-01-31

## 2024-01-31 RX ORDER — HYDROMORPHONE HCL/PF 1 MG/ML
0.4 SYRINGE (ML) INJECTION
Status: DISCONTINUED | OUTPATIENT
Start: 2024-01-31 | End: 2024-01-31 | Stop reason: HOSPADM

## 2024-01-31 RX ORDER — HYDROMORPHONE HCL IN WATER/PF 6 MG/30 ML
0.2 PATIENT CONTROLLED ANALGESIA SYRINGE INTRAVENOUS
Status: DISCONTINUED | OUTPATIENT
Start: 2024-01-31 | End: 2024-01-31 | Stop reason: HOSPADM

## 2024-01-31 RX ORDER — SODIUM CHLORIDE, SODIUM LACTATE, POTASSIUM CHLORIDE, CALCIUM CHLORIDE 600; 310; 30; 20 MG/100ML; MG/100ML; MG/100ML; MG/100ML
INJECTION, SOLUTION INTRAVENOUS CONTINUOUS PRN
Status: DISCONTINUED | OUTPATIENT
Start: 2024-01-31 | End: 2024-01-31

## 2024-01-31 RX ORDER — HYDROMORPHONE HCL/PF 1 MG/ML
SYRINGE (ML) INJECTION AS NEEDED
Status: DISCONTINUED | OUTPATIENT
Start: 2024-01-31 | End: 2024-01-31

## 2024-01-31 RX ORDER — FENTANYL CITRATE/PF 50 MCG/ML
25 SYRINGE (ML) INJECTION
Status: DISCONTINUED | OUTPATIENT
Start: 2024-01-31 | End: 2024-01-31 | Stop reason: HOSPADM

## 2024-01-31 RX ORDER — PROPOFOL 10 MG/ML
INJECTION, EMULSION INTRAVENOUS AS NEEDED
Status: DISCONTINUED | OUTPATIENT
Start: 2024-01-31 | End: 2024-01-31

## 2024-01-31 RX ORDER — FENTANYL CITRATE 50 UG/ML
INJECTION, SOLUTION INTRAMUSCULAR; INTRAVENOUS AS NEEDED
Status: DISCONTINUED | OUTPATIENT
Start: 2024-01-31 | End: 2024-01-31

## 2024-01-31 RX ORDER — LABETALOL HYDROCHLORIDE 5 MG/ML
INJECTION, SOLUTION INTRAVENOUS AS NEEDED
Status: DISCONTINUED | OUTPATIENT
Start: 2024-01-31 | End: 2024-01-31

## 2024-01-31 RX ORDER — HEPARIN SODIUM 5000 [USP'U]/ML
5000 INJECTION, SOLUTION INTRAVENOUS; SUBCUTANEOUS EVERY 8 HOURS SCHEDULED
Status: DISCONTINUED | OUTPATIENT
Start: 2024-01-31 | End: 2024-02-01

## 2024-01-31 RX ORDER — SODIUM CHLORIDE, SODIUM LACTATE, POTASSIUM CHLORIDE, CALCIUM CHLORIDE 600; 310; 30; 20 MG/100ML; MG/100ML; MG/100ML; MG/100ML
75 INJECTION, SOLUTION INTRAVENOUS CONTINUOUS
Status: DISCONTINUED | OUTPATIENT
Start: 2024-01-31 | End: 2024-02-01

## 2024-01-31 RX ORDER — MIDAZOLAM HYDROCHLORIDE 2 MG/2ML
INJECTION, SOLUTION INTRAMUSCULAR; INTRAVENOUS AS NEEDED
Status: DISCONTINUED | OUTPATIENT
Start: 2024-01-31 | End: 2024-01-31

## 2024-01-31 RX ORDER — CEFAZOLIN SODIUM 1 G/50ML
1000 SOLUTION INTRAVENOUS EVERY 8 HOURS
Qty: 100 ML | Refills: 0 | Status: COMPLETED | OUTPATIENT
Start: 2024-01-31 | End: 2024-02-01

## 2024-01-31 RX ORDER — PHENYLEPHRINE HCL IN 0.9% NACL 1 MG/10 ML
SYRINGE (ML) INTRAVENOUS AS NEEDED
Status: DISCONTINUED | OUTPATIENT
Start: 2024-01-31 | End: 2024-01-31

## 2024-01-31 RX ORDER — MAGNESIUM HYDROXIDE 1200 MG/15ML
LIQUID ORAL AS NEEDED
Status: DISCONTINUED | OUTPATIENT
Start: 2024-01-31 | End: 2024-01-31 | Stop reason: HOSPADM

## 2024-01-31 RX ORDER — SODIUM CHLORIDE 9 MG/ML
INJECTION, SOLUTION INTRAVENOUS CONTINUOUS PRN
Status: DISCONTINUED | OUTPATIENT
Start: 2024-01-31 | End: 2024-01-31

## 2024-01-31 RX ORDER — OXYCODONE HYDROCHLORIDE 10 MG/1
10 TABLET ORAL EVERY 4 HOURS PRN
Status: DISCONTINUED | OUTPATIENT
Start: 2024-01-31 | End: 2024-01-31

## 2024-01-31 RX ORDER — PROTAMINE SULFATE 10 MG/ML
INJECTION, SOLUTION INTRAVENOUS AS NEEDED
Status: DISCONTINUED | OUTPATIENT
Start: 2024-01-31 | End: 2024-01-31

## 2024-01-31 RX ORDER — CEFAZOLIN SODIUM 1 G/50ML
1000 SOLUTION INTRAVENOUS ONCE
Status: COMPLETED | OUTPATIENT
Start: 2024-01-31 | End: 2024-01-31

## 2024-01-31 RX ORDER — LIDOCAINE HYDROCHLORIDE 10 MG/ML
INJECTION, SOLUTION EPIDURAL; INFILTRATION; INTRACAUDAL; PERINEURAL AS NEEDED
Status: DISCONTINUED | OUTPATIENT
Start: 2024-01-31 | End: 2024-01-31

## 2024-01-31 RX ORDER — ROCURONIUM BROMIDE 10 MG/ML
INJECTION, SOLUTION INTRAVENOUS AS NEEDED
Status: DISCONTINUED | OUTPATIENT
Start: 2024-01-31 | End: 2024-01-31

## 2024-01-31 RX ORDER — ACETAMINOPHEN 325 MG/1
975 TABLET ORAL EVERY 6 HOURS SCHEDULED
Status: DISCONTINUED | OUTPATIENT
Start: 2024-01-31 | End: 2024-01-31

## 2024-01-31 RX ORDER — HEPARIN SODIUM 200 [USP'U]/100ML
INJECTION, SOLUTION INTRAVENOUS
Status: COMPLETED | OUTPATIENT
Start: 2024-01-31 | End: 2024-01-31

## 2024-01-31 RX ORDER — ONDANSETRON 2 MG/ML
INJECTION INTRAMUSCULAR; INTRAVENOUS AS NEEDED
Status: DISCONTINUED | OUTPATIENT
Start: 2024-01-31 | End: 2024-01-31

## 2024-01-31 RX ADMIN — Medication 2.5 MG: at 21:36

## 2024-01-31 RX ADMIN — PROTAMINE SULFATE 50 MG: 10 INJECTION, SOLUTION INTRAVENOUS at 16:44

## 2024-01-31 RX ADMIN — SODIUM CHLORIDE 75 ML/HR: 0.9 INJECTION, SOLUTION INTRAVENOUS at 19:46

## 2024-01-31 RX ADMIN — PREGABALIN 200 MG: 100 CAPSULE ORAL at 20:08

## 2024-01-31 RX ADMIN — BUPROPION HYDROCHLORIDE 300 MG: 150 TABLET, FILM COATED, EXTENDED RELEASE ORAL at 08:25

## 2024-01-31 RX ADMIN — LABETALOL HYDROCHLORIDE 5 MG: 5 INJECTION, SOLUTION INTRAVENOUS at 17:30

## 2024-01-31 RX ADMIN — CEFAZOLIN SODIUM 1000 MG: 1 SOLUTION INTRAVENOUS at 21:25

## 2024-01-31 RX ADMIN — Medication 100 MCG: at 16:40

## 2024-01-31 RX ADMIN — FENTANYL CITRATE 25 MCG: 50 INJECTION INTRAMUSCULAR; INTRAVENOUS at 18:13

## 2024-01-31 RX ADMIN — SODIUM CHLORIDE, SODIUM LACTATE, POTASSIUM CHLORIDE, AND CALCIUM CHLORIDE: .6; .31; .03; .02 INJECTION, SOLUTION INTRAVENOUS at 13:40

## 2024-01-31 RX ADMIN — HEPARIN SODIUM 5000 UNITS: 5000 INJECTION INTRAVENOUS; SUBCUTANEOUS at 21:28

## 2024-01-31 RX ADMIN — CEFAZOLIN SODIUM 1000 MG: 1 SOLUTION INTRAVENOUS at 14:01

## 2024-01-31 RX ADMIN — METHOCARBAMOL 250 MG: 500 TABLET ORAL at 05:19

## 2024-01-31 RX ADMIN — ROCURONIUM BROMIDE 50 MG: 10 INJECTION, SOLUTION INTRAVENOUS at 13:52

## 2024-01-31 RX ADMIN — MIDAZOLAM 2 MG: 1 INJECTION INTRAMUSCULAR; INTRAVENOUS at 13:40

## 2024-01-31 RX ADMIN — SODIUM CHLORIDE: 0.9 INJECTION, SOLUTION INTRAVENOUS at 13:57

## 2024-01-31 RX ADMIN — LIDOCAINE HYDROCHLORIDE 50 MG: 10 INJECTION, SOLUTION EPIDURAL; INFILTRATION; INTRACAUDAL at 13:51

## 2024-01-31 RX ADMIN — CHLORHEXIDINE GLUCONATE 15 ML: 1.2 SOLUTION ORAL at 05:19

## 2024-01-31 RX ADMIN — METHOCARBAMOL 250 MG: 500 TABLET ORAL at 21:26

## 2024-01-31 RX ADMIN — ROCURONIUM BROMIDE 10 MG: 10 INJECTION, SOLUTION INTRAVENOUS at 14:31

## 2024-01-31 RX ADMIN — PANTOPRAZOLE SODIUM 40 MG: 40 TABLET, DELAYED RELEASE ORAL at 08:25

## 2024-01-31 RX ADMIN — ACETAMINOPHEN 975 MG: 325 TABLET, FILM COATED ORAL at 05:19

## 2024-01-31 RX ADMIN — HEPARIN SODIUM 1000 UNITS: 1000 INJECTION INTRAVENOUS; SUBCUTANEOUS at 16:01

## 2024-01-31 RX ADMIN — FENTANYL CITRATE 50 MCG: 50 INJECTION INTRAMUSCULAR; INTRAVENOUS at 13:51

## 2024-01-31 RX ADMIN — ROCURONIUM BROMIDE 10 MG: 10 INJECTION, SOLUTION INTRAVENOUS at 16:19

## 2024-01-31 RX ADMIN — ACETAMINOPHEN 975 MG: 325 TABLET, FILM COATED ORAL at 21:25

## 2024-01-31 RX ADMIN — SENNOSIDES 8.6 MG: 8.6 TABLET, FILM COATED ORAL at 21:26

## 2024-01-31 RX ADMIN — FENTANYL CITRATE 50 MCG: 50 INJECTION INTRAMUSCULAR; INTRAVENOUS at 14:55

## 2024-01-31 RX ADMIN — SUGAMMADEX 200 MG: 100 INJECTION, SOLUTION INTRAVENOUS at 17:05

## 2024-01-31 RX ADMIN — OMEGA-3 FATTY ACIDS CAP 1000 MG 1000 MG: 1000 CAP at 08:25

## 2024-01-31 RX ADMIN — Medication 50 MCG: at 16:39

## 2024-01-31 RX ADMIN — AMILORIDE HYDROCLORIDE 5 MG: 5 TABLET ORAL at 08:26

## 2024-01-31 RX ADMIN — NICOTINE 1 PATCH: 21 PATCH, EXTENDED RELEASE TRANSDERMAL at 08:30

## 2024-01-31 RX ADMIN — LABETALOL HYDROCHLORIDE 5 MG: 5 INJECTION, SOLUTION INTRAVENOUS at 17:13

## 2024-01-31 RX ADMIN — PREGABALIN 200 MG: 100 CAPSULE ORAL at 08:25

## 2024-01-31 RX ADMIN — ONDANSETRON 4 MG: 2 INJECTION INTRAMUSCULAR; INTRAVENOUS at 16:58

## 2024-01-31 RX ADMIN — DOCUSATE SODIUM 100 MG: 100 CAPSULE, LIQUID FILLED ORAL at 08:25

## 2024-01-31 RX ADMIN — ASPIRIN 81 MG: 81 TABLET, COATED ORAL at 08:25

## 2024-01-31 RX ADMIN — ROCURONIUM BROMIDE 10 MG: 10 INJECTION, SOLUTION INTRAVENOUS at 15:26

## 2024-01-31 RX ADMIN — SODIUM CHLORIDE: 0.9 INJECTION, SOLUTION INTRAVENOUS at 16:45

## 2024-01-31 RX ADMIN — ENOXAPARIN SODIUM 40 MG: 40 INJECTION SUBCUTANEOUS at 08:25

## 2024-01-31 RX ADMIN — HYDROMORPHONE HYDROCHLORIDE 0.5 MG: 1 INJECTION, SOLUTION INTRAMUSCULAR; INTRAVENOUS; SUBCUTANEOUS at 17:35

## 2024-01-31 RX ADMIN — HEPARIN SODIUM 6000 UNITS: 1000 INJECTION INTRAVENOUS; SUBCUTANEOUS at 15:30

## 2024-01-31 RX ADMIN — PROPOFOL 100 MG: 10 INJECTION, EMULSION INTRAVENOUS at 13:51

## 2024-01-31 RX ADMIN — FENTANYL CITRATE 50 MCG: 50 INJECTION INTRAMUSCULAR; INTRAVENOUS at 14:50

## 2024-01-31 RX ADMIN — FLUTICASONE FUROATE AND VILANTEROL TRIFENATATE 1 PUFF: 200; 25 POWDER RESPIRATORY (INHALATION) at 10:37

## 2024-01-31 RX ADMIN — OXYBUTYNIN CHLORIDE 5 MG: 5 TABLET, EXTENDED RELEASE ORAL at 08:25

## 2024-01-31 RX ADMIN — FENTANYL CITRATE 50 MCG: 50 INJECTION INTRAMUSCULAR; INTRAVENOUS at 14:18

## 2024-01-31 NOTE — PLAN OF CARE
Problem: Prexisting or High Potential for Compromised Skin Integrity  Goal: Skin integrity is maintained or improved  Description: INTERVENTIONS:  - Identify patients at risk for skin breakdown  - Assess and monitor skin integrity  - Assess and monitor nutrition and hydration status  - Monitor labs   - Assess for incontinence   - Turn and reposition patient  - Assist with mobility/ambulation  - Relieve pressure over bony prominences  - Avoid friction and shearing  - Provide appropriate hygiene as needed including keeping skin clean and dry  - Evaluate need for skin moisturizer/barrier cream  - Collaborate with interdisciplinary team   - Patient/family teaching  - Consider wound care consult   Outcome: Progressing     Problem: PAIN - ADULT  Goal: Verbalizes/displays adequate comfort level or baseline comfort level  Description: Interventions:  - Encourage patient to monitor pain and request assistance  - Assess pain using appropriate pain scale  - Administer analgesics based on type and severity of pain and evaluate response  - Implement non-pharmacological measures as appropriate and evaluate response  - Consider cultural and social influences on pain and pain management  - Notify physician/advanced practitioner if interventions unsuccessful or patient reports new pain  Outcome: Progressing     Problem: INFECTION - ADULT  Goal: Absence or prevention of progression during hospitalization  Description: INTERVENTIONS:  - Assess and monitor for signs and symptoms of infection  - Monitor lab/diagnostic results  - Monitor all insertion sites, i.e. indwelling lines, tubes, and drains  - Monitor endotracheal if appropriate and nasal secretions for changes in amount and color  - [place] appropriate cooling/warming therapies per order  - Administer medications as ordered  - Instruct and encourage patient and family to use good hand hygiene technique  - Identify and instruct in appropriate isolation precautions for  identified infection/condition  Outcome: Progressing  Goal: Absence of fever/infection during neutropenic period  Description: INTERVENTIONS:  - Monitor WBC    Outcome: Progressing     Problem: SAFETY ADULT  Goal: Patient will remain free of falls  Description: INTERVENTIONS:  - Educate patient/family on patient safety including physical limitations  - Instruct patient to call for assistance with activity   - Consult OT/PT to assist with strengthening/mobility   - Keep Call bell within reach  - Keep bed low and locked with side rails adjusted as appropriate  - Keep care items and personal belongings within reach  - Initiate and maintain comfort rounds  - Make Fall Risk Sign visible to staff  - Offer Toileting every *** Hours, in advance of need  - Initiate/Maintain ***alarm  - Obtain necessary fall risk management equipment: ***  - Apply yellow socks and bracelet for high fall risk patients  - Consider moving patient to room near nurses station  Outcome: Progressing  Goal: Maintain or return to baseline ADL function  Description: INTERVENTIONS:  -  Assess patient's ability to carry out ADLs; assess patient's baseline for ADL function and identify physical deficits which impact ability to perform ADLs (bathing, care of mouth/teeth, toileting, grooming, dressing, etc.)  - Assess/evaluate cause of self-care deficits   - Assess range of motion  - Assess patient's mobility; develop plan if impaired  - Assess patient's need for assistive devices and provide as appropriate  - Encourage maximum independence but intervene and supervise when necessary  - Involve family in performance of ADLs  - Assess for home care needs following discharge   - Consider OT consult to assist with ADL evaluation and planning for discharge  - Provide patient education as appropriate  Outcome: Progressing  Goal: Maintains/Returns to pre admission functional level  Description: INTERVENTIONS:  - Perform AM-PAC 6 Click Basic Mobility/ Daily  Activity assessment daily.  - Set and communicate daily mobility goal to care team and patient/family/caregiver.   - Collaborate with rehabilitation services on mobility goals if consulted  - Perform Range of Motion *** times a day.  - Reposition patient every *** hours.  - Dangle patient *** times a day  - Stand patient *** times a day  - Ambulate patient *** times a day  - Out of bed to chair *** times a day   - Out of bed for meals *** times a day  - Out of bed for toileting  - Record patient progress and toleration of activity level   Outcome: Progressing     Problem: DISCHARGE PLANNING  Goal: Discharge to home or other facility with appropriate resources  Description: INTERVENTIONS:  - Identify barriers to discharge w/patient and caregiver  - Arrange for needed discharge resources and transportation as appropriate  - Identify discharge learning needs (meds, wound care, etc.)  - Arrange for interpretive services to assist at discharge as needed  - Refer to Case Management Department for coordinating discharge planning if the patient needs post-hospital services based on physician/advanced practitioner order or complex needs related to functional status, cognitive ability, or social support system  Outcome: Progressing     Problem: Knowledge Deficit  Goal: Patient/family/caregiver demonstrates understanding of disease process, treatment plan, medications, and discharge instructions  Description: Complete learning assessment and assess knowledge base.  Interventions:  - Provide teaching at level of understanding  - Provide teaching via preferred learning methods  Outcome: Progressing

## 2024-01-31 NOTE — OCCUPATIONAL THERAPY NOTE
"          Occupational Therapy Cancel Note        Patient Name: Lona Cedeno  Today's Date: 1/31/2024 01/31/24 0803   OT Last Visit   OT Visit Date 01/31/24   Note Type   Note Type Cancelled Session   Cancel Reasons Patient to operating room       Per chart review, pt pending OR for \"Right Ax-Fem, Poss BKA\" today. Will hold and see s/p procedure when medically appropriate.     Shara Milan MS, OTR/L                         "

## 2024-01-31 NOTE — ANESTHESIA PREPROCEDURE EVALUATION
"Procedure:  BYPASS AXILLO-FEMORAL RIGHT (Right: Leg Upper)  AMPUTATION BELOW KNEE (BKA) POSSIBLE (Right: Leg Lower)  \"Assessment:     78 y.o. with PMH HTN, HLD, COPD, DM, Right Ankle ORIF presenting with AIOD, Right CLTI c exposed, necrotic heel wound.     Vascular surgery consulted for AIOD, Right CLTI c exposed necrotic heel wound.      Plan:  - OR 1/31 for Right Ax-Fem, Poss BKA  - Poss staged BKA  - RUE Art duplex without arterial disease; palpable right brachial, radial  - 2u PRBC OCTOR  - Diet NPO  - Abx None  - VTEppx: SQH  - Cont Aspirin, Statin  - Further recommendations pending clinical course \"        Relevant Problems   CARDIO   (+) Essential hypertension   (+) PAD (peripheral artery disease) (HCC)      ENDO   (+) Type 2 diabetes mellitus with diabetic polyneuropathy (HCC)      GI/HEPATIC   (+) GERD without esophagitis      MUSCULOSKELETAL   (+) Degenerative lumbar disc      NEURO/PSYCH   (+) Anxiety and depression      PULMONARY   (+) COPD, severity to be determined (HCC)      Other   (+) Tobacco use disorder        Physical Exam    Airway    Mallampati score: II  TM Distance: >3 FB  Neck ROM: limited     Dental        Cardiovascular      Pulmonary      Other Findings  post-pubertal.      Anesthesia Plan  ASA Score- 3     Anesthesia Type- general with ASA Monitors.         Additional Monitors: arterial line.    Airway Plan: ETT.    Comment: Right Fem Pop block.       Plan Factors-    Chart reviewed. EKG reviewed. Imaging results reviewed. Existing labs reviewed. Patient summary reviewed.    Patient is a current smoker.              Induction- intravenous.    Postoperative Plan- Plan for postoperative opioid use. Planned trial extubation    Informed Consent- Anesthetic plan and risks discussed with patient.  I personally reviewed this patient with the CRNA. Discussed and agreed on the Anesthesia Plan with the CRNA..            VITALS  /74   Pulse 66   Temp 97.5 °F (36.4 °C)   Resp 18   Ht 5' 1\" " Internal Medicine Follow Up    Chief Complaint  Mona Wilkerson is a 54 y.o. female who presents today for follow up of chronic medical conditions outlined below.    Chief Complaint   Patient presents with   • Med Refill     Hypothyroidism, GERD, insomnia, anxiety and depression        HPI  Ms. Wilkerson comes in today for medication refills. She is several months overdue for her visit. She notes that since last visit she has been diagnosed with cervical DDD and had neurosurgery eval. Using flexeril PRN for relief. No surgical intervention. She has cut back on alcohol use and with this has also quit smoking. Her last drink was 2 weeks ago. She notes plan to establish soon with [facility] for psychiatric care. She notes that she did not tolerate elavil so back on trazodone but feels it causes mental fog. Would like referral again to sleep medicine.       Review of Systems  Review of Systems   Constitutional: Negative.    Respiratory: Negative.    Cardiovascular: Negative.    Genitourinary: Negative.    Musculoskeletal: Positive for neck pain.   Neurological: Positive for memory problem (brain fog).   Psychiatric/Behavioral: Positive for sleep disturbance.        Current Medications  Current Outpatient Medications on File Prior to Visit   Medication Sig Dispense Refill   • cetirizine (zyrTEC) 10 MG tablet Take 10 mg by mouth Daily.     • cholecalciferol (VITAMIN D3) 25 MCG (1000 UT) tablet Take 5,000 Units by mouth Daily.     • estradiol (Vagifem) 10 MCG tablet vaginal tablet Insert 1 tablet into the vagina 2 (Two) Times a Week. 24 tablet 3   • traZODone (DESYREL) 100 MG tablet Take 1 tablet by mouth Every Night. 90 tablet 1   • [DISCONTINUED] cyclobenzaprine (FLEXERIL) 10 MG tablet Take 1 tablet by mouth 3 (Three) Times a Day As Needed for Muscle Spasms. 30 tablet 0   • [DISCONTINUED] FLUoxetine (PROzac) 40 MG capsule TAKE ONE CAPSULE BY MOUTH ONE TIME DAILY 45 capsule 0   • [DISCONTINUED] levothyroxine  "(SYNTHROID, LEVOTHROID) 50 MCG tablet TAKE ONE TABLET BY MOUTH ONE TIME DAILY 45 tablet 0   • [DISCONTINUED] omeprazole (priLOSEC) 20 MG capsule Take 1 capsule by mouth Daily. 90 capsule 1   • Biotin 1000 MCG tablet Take 1,000 mcg by mouth Daily.     • [DISCONTINUED] ibuprofen (ADVIL,MOTRIN) 600 MG tablet Take 1 tablet by mouth Every 6 (Six) Hours As Needed for Mild Pain . 40 tablet 0     No current facility-administered medications on file prior to visit.       Allergies  No Known Allergies    Objective  Visit Vitals  /72   Pulse 80   Temp 96.8 °F (36 °C)   Resp 18   Ht 160 cm (62.99\")   Wt 78.7 kg (173 lb 6.4 oz)   SpO2 98%   BMI 30.72 kg/m²        Physical Exam  Physical Exam  Vitals and nursing note reviewed.   Constitutional:       General: She is not in acute distress.     Appearance: She is well-developed. She is not ill-appearing or toxic-appearing.   HENT:      Head: Normocephalic and atraumatic.   Eyes:      Conjunctiva/sclera: Conjunctivae normal.   Pulmonary:      Effort: Pulmonary effort is normal. No respiratory distress.   Skin:     General: Skin is warm and dry.   Neurological:      Mental Status: She is alert and oriented to person, place, and time. Mental status is at baseline.      Gait: Gait normal.   Psychiatric:         Mood and Affect: Mood normal.         Behavior: Behavior normal.         Results  Results for orders placed or performed in visit on 01/14/22   Celiac Comprehensive Panel    Specimen: Blood   Result Value Ref Range    Gliadin Deamidated Peptide Ab, IgA 6 0 - 19 units    Deaminated Gliadin Ab IgG 4 0 - 19 units    Tissue Transglutaminase IgA <2 0 - 3 U/mL    Tissue Transglutaminase IgG <2 0 - 5 U/mL    Endomysial IgA Negative Negative    IgA 214 87 - 352 mg/dL        Assessment and Plan  Diagnoses and all orders for this visit:    Acquired hypothyroidism  - Continue levothyroxine 50mcg daily and update TSH    GERD without esophagitis  - controlled, continue omeprazole 20mg " (1.549 m)   Wt 66.1 kg (145 lb 11.6 oz)   SpO2 93%   BMI 27.53 kg/m²   BP Readings from Last 3 Encounters:   01/31/24 142/74   01/26/24 130/57   01/23/24 128/78     LABS  Results from Last 12 Months   Lab Units 01/30/24  0525 01/28/24  0619 01/27/24  0529 01/24/24  0505 01/23/24  1940 11/07/23  1015 08/13/23  0531 08/12/23  0425 08/11/23  0025 07/05/23  1012   SODIUM mmol/L 140 141 140   < > 135 138   < > 129*   < > 133*   POTASSIUM mmol/L 4.4 3.9 3.7   < > 4.5 4.9   < > 4.0   < > 4.0   CHLORIDE mmol/L 103 105 103   < > 99 100   < > 91*   < > 98   CO2 mmol/L 30 28 30   < > 25 28   < > 33*   < > 30   ANION GAP mmol/L 7 8 7   < > 11 10   < > 5   < > 5   BUN mg/dL 12 10 8   < > 12 8   < > 15   < > 11   CREATININE mg/dL 0.59* 0.42* 0.51*   < > 0.54* 0.49*   < > 0.59*   < > 0.58*   CALCIUM mg/dL 9.6 9.7 10.0   < > 10.3* 10.0   < > 9.1   < > 10.4*   GLUCOSE RANDOM mg/dL 134 127 115   < > 120  --    < > 130   < >  --    PHOSPHORUS mg/dL  --   --  3.6  --   --   --   --   --   --   --    MAGNESIUM mg/dL  --   --  1.7*  --   --   --   --  2.2   < >  --    AST U/L  --   --   --   --  10* 14   < >  --    < > 19   ALT U/L  --   --   --   --  7 6*   < >  --    < > 22   ALK PHOS U/L  --   --   --   --  64 50   < >  --    < > 65   TOTAL BILIRUBIN mg/dL  --   --   --   --  0.21 0.36   < >  --    < > 0.38   ALBUMIN g/dL  --   --   --   --  4.2 4.1   < >  --    < > 3.8   HEMOGLOBIN A1C %  --   --   --   --   --  6.5*  --   --   --  6.1*    < > = values in this interval not displayed.     Results from Last 12 Months   Lab Units 01/30/24  0525 01/28/24  0619   WBC Thousand/uL 7.77 8.05   HEMOGLOBIN g/dL 13.0 13.2   HEMATOCRIT % 40.7 40.8   PLATELETS Thousands/uL 316 332     Results from Last 12 Months   Lab Units 01/23/24  1940 08/11/23  0025   INR  1.03 1.08   PTT seconds 29 28       ECG  Encounter Date: 01/23/24   ECG 12 lead   Result Value    Ventricular Rate 78    Atrial Rate 78    WI Interval 178    QRSD Interval 102    QT  Interval 356    QTC Interval 405    P Axis 57    QRS Axis -28    T Wave Axis 80    Narrative    Normal sinus rhythm  Normal ECG  When compared with ECG of 11-AUG-2023 04:26,  Premature ventricular complexes are no longer Present  Confirmed by Alexander Cespedes ([id number]) on 1/24/2024 9:11:49 AM     No results found for this or any previous visit.    ECHOCARDIOGRAPHY AND OTHER TESTING/IMAGING  2023    History    CAD, GERD, DM, Increased Troponin level, HTN     Interpretation Summary         Left Ventricle: Left ventricular cavity size is normal. Wall thickness is mild-moderately increased. The left ventricular ejection fraction is 50-55%. Systolic function is low normal. Although no diagnostic regional wall motion abnormality was identified, this possibility cannot be completely excluded on the basis of this study. Diastolic function is mildly abnormal, consistent with grade I (abnormal) relaxation.    Left Atrium: The atrium is mildly dilated.    Mitral Valve: There is mild annular calcification. There is mild regurgitation.    Tricuspid Valve: There is mild to moderate regurgitation.    Pulmonic Valve: There is mild regurgitation.    ANESTHESIA RISK-BENEFIT DISCUSSION  BENEFITS INCLUDE (NBK [id number], PMID 90512867):   (1) A specialized anesthesia team reduces mortality and morbidity for major surgeries.  (2) The team provides analgesia/sedation/amnesia/akinesia as safely as possible.  (3) The team strives to reduce discomfort as much as reasonably possible.    RISKS ASSESSMENT (AND PLANS TO MITIGATE RISKS) INCLUDE:    Neurologic system: IntraOp awareness (Risk is ~1:1,000 - 1:14,000; PMID 70530164), Stroke (Risk ~<0.1-2% for most cases; PMID 63309130), nerve injury, vision loss, and POCD.     Airway and Pulmonary system: Dental or mouth injury, throat pain, critical hypoxia, pneumothorax, prolonged intubation, post-op respiratory compromise.  Airway/Intubation risks and prior data: Mask ventilation not attempted (0);  daily    Anxiety and Recurrent major depressive disorder, in partial remission (HCC)  - On prozac 40mg daily, notes upcoming psychiatry evaluation to consider alternative treatment    Insomnia due to other mental disorder  - On trazodone currently but notes daytime fog. Has used elavil with intolerance. Wants to try lunesta. Recommend discussing alternatives with sleep medicine.    Alcohol use  - She recently has quit drinking, congratulated    Iron deficiency anemia, unspecified iron deficiency anemia type  - Hgb 11.2 4/2021. Subsequently found to have iron deficiency. Folate and B12 nl. Anemia had resolved in 9/2021.  - Iron deficiency potentially due to alcohol use, poor nutrition. GI loss also a consideration.  - Colonoscopy negative 11/2021  - She has stopped iron supplement, also no longer drinking alcohol  - Check CBC, iron studies today    Stage 3a chronic kidney disease (HCC)  - Baseline gfr 59.   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  - Will check vitamin D level    ASCUS of cervix with negative high risk HPV  - Had pap smear 9/2021 with ASCUS negative HPV. Established with GYN who started vaginal estrogen but she has been lost to follow up. Noted that if repeat pap was ASCUS would need colpo.  - Recommend she reschedule missed GYN appt    DDD (degenerative disc disease), cervical  - Seen in ED 3/2022 after fall. CT with C4-5 and C5-6 mild spinal stenosis. She subsequently had neurosurgery eval without evidence of radiculopathy or myelopathy. Surgery deferred.  - Managing pain with flexeril PRN    Screening, lipid  -     Lipid Panel; Future    Screening for diabetes mellitus  -     Hemoglobin A1c; Future     Health Maintenance  - Pap smear:  2021 ASCUS, neg HPV. Following with GYN.  - Mammogram: 2021 BIRADS 1  - Colonoscopy: 2021, repeat 5y  - Lung cancer screenin2021, repeat annually  - HCV: negative  - Immunizations: COVID booster discussed. Tdap 2015. Shingrix #1.  - Depression screening: negative 2021    Return in about 7 weeks (around 6/10/2022) for as scheduled, Labs today.   Brand: LMA; Size: 4; Insertion Attempts: 1; Placement Verify: Auscultation, End tidal CO2   Major ARISCAT risk factors for pulmonary complications include: Other factors/Clinical gestalt: 1 pt, yielding a score 0-1= Low risk, 1.6%.  Cardiovascular system: Hypotension/Vasoplegia, arrhythmias, blood clot, az-op cardiac injury/MACE, bleeding, infection, or injury to vascular structures.  Signs of active, severe cardiac instability: none  Winston's RCRI score items: ICM, yielding an RCRI Score of 1= 0.6% risk of MACE  Are az-op or intra-op beta blockers indicated? (PMID 74782327): no  FEN/GI system: Aspiration risk (~0.5% [facility] [id number]) and PONV (10-80% per Apfel score).  ASA NPO guideline compliance?: Yes  Medication risk assessment: Allergic reactions, bleeding due to anticoagulant use, overdoses, drug-drug interactions, injury to a fetus or  in pregnant or breastfeeding patients, sedation while driving/operating heavy machinery.  Recent relevant medications: See MAR or Med Review  Personal or family history of anesthesia complications: no  Pregnancy Status: N/A  Estimate mortality risks associated with anesthesia based on ASA-PS (PMID 00750092): ASA-PS III: 1:3,500      DNR/I status discussion.  The patient is currently DNR/I.  She patient desires to go to full code for the duration of the surgery and PACU period and then return to DNR/I afterwards

## 2024-01-31 NOTE — PROGRESS NOTES
Progress Note - Vascular Surgery   Lona Cedeno 78 y.o. female [id number]  Unit/Bed#:-01 Encounter: [id number]      Assessment:    78 y.o. with PMH HTN, HLD, COPD, DM, Right Ankle ORIF presenting with AIOD, Right CLTI c exposed, necrotic heel wound.     Vascular surgery consulted for AIOD, Right CLTI c exposed necrotic heel wound.     Plan:  - OR 1/31 for Right Ax-Fem, Poss BKA  - Poss staged BKA  - RUE Art duplex without arterial disease; palpable right brachial, radial  - 2u PRBC OCTOR  - Diet NPO  - Abx None  - VTEppx: SQH  - Cont Aspirin, Statin  - Further recommendations pending clinical course       Subjective:    Pt s/e. No acute events overnight. Pt awake, alert.    Tolerating diet. Not OOB. Voiding.      No new complaints. Denies n/v, sob, chest pain, f/c. No sensory change, numbness, or weakness of lower extremities    I/Os:  I/O last 3 completed shifts:  In: 240 [P.O.:240]  Out: 2100 [Urine:2100]  No intake/output data recorded.    Review of Systems   All other systems reviewed and are negative.      Vitals:    01/31/24 0708   BP: 142/74   Pulse: 66   Resp: 18   Temp: 97.5 °F (36.4 °C)   SpO2: 93%        Physical Exam  Vitals and nursing note reviewed.   Constitutional:       General: She is not in acute distress.     Appearance: She is well-developed. She is not diaphoretic.   HENT:      Head: Normocephalic and atraumatic.   Eyes:      Conjunctiva/sclera: Conjunctivae normal.   Cardiovascular:      Rate and Rhythm: Normal rate and regular rhythm.      Heart sounds: No murmur heard.     Comments: RUE Palp brachial, radial pulse     B/L no-palp fem  RLE no pedal signals   Pulmonary:      Effort: Pulmonary effort is normal. No respiratory distress.      Breath sounds: Normal breath sounds.   Abdominal:      Palpations: Abdomen is soft.      Tenderness: There is no abdominal tenderness.   Musculoskeletal:         General: Signs of injury present. No swelling.      Cervical back: Neck supple.       Comments: Right heel exposed achilles tendon, no erythema, no drainage    Skin:     General: Skin is warm and dry.      Capillary Refill: Capillary refill takes less than 2 seconds.   Neurological:      General: No focal deficit present.      Mental Status: She is alert and oriented to person, place, and time. Mental status is at baseline.   Psychiatric:         Mood and Affect: Mood normal.         Imaging:I have personally reviewed pertinent reports.      Lab Results and Cultures:   Lab Results   Component Value Date    WBC 7.77 01/30/2024    HGB 13.0 01/30/2024    HCT 40.7 01/30/2024    MCV 95 01/30/2024     01/30/2024     Lab Results   Component Value Date    CALCIUM 9.6 01/30/2024     05/21/2018    K 4.4 01/30/2024    CO2 30 01/30/2024     01/30/2024    BUN 12 01/30/2024    CREATININE 0.59 (L) 01/30/2024     Lab Results   Component Value Date    INR 1.03 01/23/2024    INR 1.08 08/11/2023    PROTIME 13.6 01/23/2024    PROTIME 14.1 08/11/2023        Blood Culture:   Lab Results   Component Value Date    BLOODCX No Growth After 5 Days. 01/23/2024   ,   Urinalysis:   Lab Results   Component Value Date    COLORU Yellow 11/20/2023    COLORU YELLOW 05/21/2018    CLARITYU Clear 11/20/2023    CLARITYU CLEAR 05/21/2018    SPECGRAV 1.010 11/20/2023    SPECGRAV 1.015 05/21/2018    PHUR 5.0 11/20/2023    PHUR 6.0 05/21/2018    LEUKOCYTESUR Negative 11/20/2023    LEUKOCYTESUR NEGATIVE 05/21/2018    NITRITE Negative 11/20/2023    NITRITE NEGATIVE 05/21/2018    PROTEINUA NEGATIVE 05/21/2018    GLUCOSEU Negative 11/20/2023    GLUCOSEU NEGATIVE 05/21/2018    KETONESU Negative 11/20/2023    KETONESU NEGATIVE 05/21/2018    BILIRUBINUR Negative 11/20/2023    BILIRUBINUR NEGATIVE 05/21/2018    BLOODU Negative 11/20/2023    BLOODU NEGATIVE 05/21/2018   ,   Urine Culture:   Lab Results   Component Value Date    URINECX No Growth <1000 cfu/mL 11/20/2023   ,   Wound Culure:   Lab Results   Component Value Date     WOUNDCULT 1+ Growth of 01/23/2024       Medications:  Current Facility-Administered Medications   Medication Dose Route Frequency    acetaminophen (TYLENOL) tablet 975 mg  975 mg Oral Q8H JILLIAN    albuterol (PROVENTIL HFA,VENTOLIN HFA) inhaler 2 puff  2 puff Inhalation Q6H PRN    AMILoride tablet 5 mg  5 mg Oral Daily    aspirin (ECOTRIN LOW STRENGTH) EC tablet 81 mg  81 mg Oral Daily    atorvastatin (LIPITOR) tablet 10 mg  10 mg Oral Daily With Dinner    buPROPion (WELLBUTRIN XL) 24 hr tablet 300 mg  300 mg Oral Daily    clonazePAM (KlonoPIN) tablet 1 mg  1 mg Oral QPM    docusate sodium (COLACE) capsule 100 mg  100 mg Oral BID    enoxaparin (LOVENOX) subcutaneous injection 40 mg  40 mg Subcutaneous Daily    fish oil capsule 1,000 mg  1,000 mg Oral Daily    fluticasone-vilanterol 200-25 mcg/actuation 1 puff  1 puff Inhalation Daily    heparin (porcine) 2,000 Units, papaverine 60 mg in multi-electrolyte (PLASMALYTE-A/ISOLYTE-S PH 7.4) 500 mL irrigation   Irrigation Once    HYDROmorphone HCl (DILAUDID) injection 0.2 mg  0.2 mg Intravenous Q4H PRN    insulin lispro (HumaLOG) 100 units/mL subcutaneous injection 1-5 Units  1-5 Units Subcutaneous TID AC    insulin lispro (HumaLOG) 100 units/mL subcutaneous injection 1-5 Units  1-5 Units Subcutaneous HS    insulin lispro (HumaLOG) 100 units/mL subcutaneous injection 5 Units  5 Units Subcutaneous TID With Meals    methocarbamol (ROBAXIN) tablet 250 mg  250 mg Oral Q8H JILLIAN    nicotine (NICODERM CQ) 21 mg/24 hr TD 24 hr patch 1 patch  1 patch Transdermal Daily    ondansetron (ZOFRAN) injection 4 mg  4 mg Intravenous Q6H PRN    oxybutynin (DITROPAN-XL) 24 hr tablet 5 mg  5 mg Oral Daily    oxyCODONE (ROXICODONE) split tablet 2.5 mg  2.5 mg Oral Q4H PRN    Or    oxyCODONE (ROXICODONE) IR tablet 5 mg  5 mg Oral Q4H PRN    pantoprazole (PROTONIX) EC tablet 40 mg  40 mg Oral Early Morning    polyethylene glycol (MIRALAX) packet 17 g  17 g Oral Daily PRN    pregabalin (LYRICA)  capsule 200 mg  200 mg Oral TID    senna (SENOKOT) tablet 8.6 mg  1 tablet Oral HS       Patient Active Problem List   Diagnosis    Type 2 diabetes mellitus with diabetic polyneuropathy (HCC)    Essential hypertension    Anxiety and depression    Medicare annual wellness visit, subsequent    GERD without esophagitis    Urinary incontinence    Degenerative lumbar disc    Lactic acidosis    Hypomagnesemia    Bladder neoplasm    Left renal mass    PAD (peripheral artery disease) (HCC)    Abnormal CT of the chest    COPD, severity to be determined (HCC)    Tobacco use disorder    Age-related osteoporosis without current pathological fracture    Ankle ulcer, right, with fat layer exposed (HCC)    Diabetic foot ulcer with osteomyelitis (HCC)    Preoperative cardiovascular examination    Constipation       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;  Surgeon: Cristian Child MD;  Location: [facility];  Service: Urology    FL RETROGRADE PYELOGRAM  2023    HERNIA REPAIR      umbilicus w/ mesh       Family History   Problem Relation Age of Onset    COPD Mother     Emphysema Mother     COPD Father     Emphysema Father        Social History     Socioeconomic History    Marital status: /Civil Union     Spouse name: Not on file    Number of children: Not on file    Years of education: Not on file    Highest education level: Not on file   Occupational History    Not on file   Tobacco Use    Smoking status: Every Day     Current packs/day: 1.00      Average packs/day: 1 pack/day for 63.1 years (63.1 ttl pk-yrs)     Types: Cigarettes     Start date: 1961    Smokeless tobacco: Never    Tobacco comments:     11/14/23 smokes about 3/4 PPD   Vaping Use    Vaping status: Never Used   Substance and Sexual Activity    Alcohol use: Not Currently    Drug use: Never    Sexual activity: Not on file   Other Topics Concern    Not on file   Social History Narrative    Not on file     Social Determinants of Health     Financial Resource Strain: Low Risk  (2/9/2023)    Overall Financial Resource Strain (CARDIA)     Difficulty of Paying Living Expenses: Not hard at all   Food Insecurity: No Food Insecurity (1/24/2024)    Hunger Vital Sign     Worried About Running Out of Food in the Last Year: Never true     Ran Out of Food in the Last Year: Never true   Transportation Needs: No Transportation Needs (1/24/2024)    PRAPARE - Transportation     Lack of Transportation (Medical): No     Lack of Transportation (Non-Medical): No   Physical Activity: Not on file   Stress: Not on file   Social Connections: Not on file   Intimate Partner Violence: Not on file   Housing Stability: Low Risk  (1/24/2024)    Housing Stability Vital Sign     Unable to Pay for Housing in the Last Year: No     Number of Places Lived in the Last Year: 1     Unstable Housing in the Last Year: No       Allergies   Allergen Reactions    Paroxetine Other (See Comments)     Became suicidal    Adhesive [Medical Tape] Itching and Blisters    Carafate [Sucralfate] Other (See Comments)     Mouth sores, tongue sore    Sulfa Antibiotics Hives and Rash    Sulfamethoxazole-Trimethoprim Hives

## 2024-01-31 NOTE — ASSESSMENT & PLAN NOTE
ASA and Lipitor  LDL 80  Vascular consulted for potential bypass surgery with wounds as above   [place] [facility] [place]

## 2024-01-31 NOTE — ASSESSMENT & PLAN NOTE
Lab Results   Component Value Date    HGBA1C 6.5 (H) 11/07/2023       Recent Labs     01/30/24  1639 01/30/24  2150 01/31/24  0518 01/31/24  1128   POCGLU 75 137 132 130         Blood Sugar Average: Last 72 hrs:  (P) [id number]  C/w Humalog AC  C/w ISS

## 2024-01-31 NOTE — PLAN OF CARE
Problem: Prexisting or High Potential for Compromised Skin Integrity  Goal: Skin integrity is maintained or improved  Description: INTERVENTIONS:  - Identify patients at risk for skin breakdown  - Assess and monitor skin integrity  - Assess and monitor nutrition and hydration status  - Monitor labs   - Assess for incontinence   - Turn and reposition patient  - Assist with mobility/ambulation  - Relieve pressure over bony prominences  - Avoid friction and shearing  - Provide appropriate hygiene as needed including keeping skin clean and dry  - Evaluate need for skin moisturizer/barrier cream  - Collaborate with interdisciplinary team   - Patient/family teaching  - Consider wound care consult   1/31/2024 0002 by Esther Aranda RN  Outcome: Progressing  1/31/2024 0001 by Esther Aranda RN  Outcome: Progressing     Problem: PAIN - ADULT  Goal: Verbalizes/displays adequate comfort level or baseline comfort level  Description: Interventions:  - Encourage patient to monitor pain and request assistance  - Assess pain using appropriate pain scale  - Administer analgesics based on type and severity of pain and evaluate response  - Implement non-pharmacological measures as appropriate and evaluate response  - Consider cultural and social influences on pain and pain management  - Notify physician/advanced practitioner if interventions unsuccessful or patient reports new pain  1/31/2024 0002 by Esther Aranda RN  Outcome: Progressing  1/31/2024 0001 by Esther Aranda RN  Outcome: Progressing     Problem: INFECTION - ADULT  Goal: Absence or prevention of progression during hospitalization  Description: INTERVENTIONS:  - Assess and monitor for signs and symptoms of infection  - Monitor lab/diagnostic results  - Monitor all insertion sites, i.e. indwelling lines, tubes, and drains  - Monitor endotracheal if appropriate and nasal secretions for changes in amount and color  - [place] appropriate cooling/warming therapies per  order  - Administer medications as ordered  - Instruct and encourage patient and family to use good hand hygiene technique  - Identify and instruct in appropriate isolation precautions for identified infection/condition  1/31/2024 0002 by Esther Aranda RN  Outcome: Progressing  1/31/2024 0001 by Esther Aranda RN  Outcome: Progressing  Goal: Absence of fever/infection during neutropenic period  Description: INTERVENTIONS:  - Monitor WBC    1/31/2024 0002 by Esther Aranda RN  Outcome: Progressing  1/31/2024 0001 by Esther Aranda RN  Outcome: Progressing     Problem: SAFETY ADULT  Goal: Patient will remain free of falls  Description: INTERVENTIONS:  - Educate patient/family on patient safety including physical limitations  - Instruct patient to call for assistance with activity   - Consult OT/PT to assist with strengthening/mobility   - Keep Call bell within reach  - Keep bed low and locked with side rails adjusted as appropriate  - Keep care items and personal belongings within reach  - Initiate and maintain comfort rounds  - Make Fall Risk Sign visible to staff  - Offer Toileting every *** Hours, in advance of need  - Initiate/Maintain ***alarm  - Obtain necessary fall risk management equipment: ***  - Apply yellow socks and bracelet for high fall risk patients  - Consider moving patient to room near nurses station  1/31/2024 0002 by Esther Aranda RN  Outcome: Progressing  1/31/2024 0001 by Esther Aranda RN  Outcome: Progressing  Goal: Maintain or return to baseline ADL function  Description: INTERVENTIONS:  -  Assess patient's ability to carry out ADLs; assess patient's baseline for ADL function and identify physical deficits which impact ability to perform ADLs (bathing, care of mouth/teeth, toileting, grooming, dressing, etc.)  - Assess/evaluate cause of self-care deficits   - Assess range of motion  - Assess patient's mobility; develop plan if impaired  - Assess patient's need for assistive devices and  provide as appropriate  - Encourage maximum independence but intervene and supervise when necessary  - Involve family in performance of ADLs  - Assess for home care needs following discharge   - Consider OT consult to assist with ADL evaluation and planning for discharge  - Provide patient education as appropriate  1/31/2024 0002 by Esther Aranda RN  Outcome: Progressing  1/31/2024 0001 by Esther Aranda RN  Outcome: Progressing  Goal: Maintains/Returns to pre admission functional level  Description: INTERVENTIONS:  - Perform AM-PAC 6 Click Basic Mobility/ Daily Activity assessment daily.  - Set and communicate daily mobility goal to care team and patient/family/caregiver.   - Collaborate with rehabilitation services on mobility goals if consulted  - Perform Range of Motion *** times a day.  - Reposition patient every *** hours.  - Dangle patient *** times a day  - Stand patient *** times a day  - Ambulate patient *** times a day  - Out of bed to chair *** times a day   - Out of bed for meals *** times a day  - Out of bed for toileting  - Record patient progress and toleration of activity level   1/31/2024 0002 by Esther Aranda RN  Outcome: Progressing  1/31/2024 0001 by Esther Aranda RN  Outcome: Progressing     Problem: DISCHARGE PLANNING  Goal: Discharge to home or other facility with appropriate resources  Description: INTERVENTIONS:  - Identify barriers to discharge w/patient and caregiver  - Arrange for needed discharge resources and transportation as appropriate  - Identify discharge learning needs (meds, wound care, etc.)  - Arrange for interpretive services to assist at discharge as needed  - Refer to Case Management Department for coordinating discharge planning if the patient needs post-hospital services based on physician/advanced practitioner order or complex needs related to functional status, cognitive ability, or social support system  1/31/2024 0002 by Esther Aranda RN  Outcome:  Progressing  1/31/2024 0001 by Esther Aranda RN  Outcome: Progressing     Problem: Knowledge Deficit  Goal: Patient/family/caregiver demonstrates understanding of disease process, treatment plan, medications, and discharge instructions  Description: Complete learning assessment and assess knowledge base.  Interventions:  - Provide teaching at level of understanding  - Provide teaching via preferred learning methods  1/31/2024 0002 by Esther Aranda RN  Outcome: Progressing  1/31/2024 0001 by Esther Aranda RN  Outcome: Progressing

## 2024-01-31 NOTE — PLAN OF CARE
Problem: PAIN - ADULT  Goal: Verbalizes/displays adequate comfort level or baseline comfort level  Description: Interventions:  - Encourage patient to monitor pain and request assistance  - Assess pain using appropriate pain scale  - Administer analgesics based on type and severity of pain and evaluate response  - Implement non-pharmacological measures as appropriate and evaluate response  - Consider cultural and social influences on pain and pain management  - Notify physician/advanced practitioner if interventions unsuccessful or patient reports new pain  Outcome: Progressing     Problem: INFECTION - ADULT  Goal: Absence or prevention of progression during hospitalization  Description: INTERVENTIONS:  - Assess and monitor for signs and symptoms of infection  - Monitor lab/diagnostic results  - Monitor all insertion sites, i.e. indwelling lines, tubes, and drains  - Monitor endotracheal if appropriate and nasal secretions for changes in amount and color  - [place] appropriate cooling/warming therapies per order  - Administer medications as ordered  - Instruct and encourage patient and family to use good hand hygiene technique  - Identify and instruct in appropriate isolation precautions for identified infection/condition  Outcome: Progressing  Goal: Absence of fever/infection during neutropenic period  Description: INTERVENTIONS:  - Monitor WBC    Outcome: Progressing

## 2024-01-31 NOTE — PROGRESS NOTES
[facility]  Progress Note  Name: Lona Cedeno I  MRN: [id number]  Unit/Bed#: OR POOL I Date of Admission: 1/26/2024   Date of Service: 1/31/2024 I Hospital Day: 5    Assessment/Plan   Constipation  Assessment & Plan  Bowel regimen   Reports she had BM 1/29     Tobacco use disorder  Assessment & Plan  Nicotine TD    PAD (peripheral artery disease) ([facility])  Assessment & Plan  ASA and Lipitor  LDL 80  Vascular consulted for potential bypass surgery with wounds as above   Cardio at [facility] cleared for bypass    Type 2 diabetes mellitus with diabetic polyneuropathy ([facility])  Assessment & Plan  Lab Results   Component Value Date    HGBA1C 6.5 (H) 11/07/2023       Recent Labs     01/30/24  1639 01/30/24  2150 01/31/24  0518 01/31/24  1128   POCGLU 75 137 132 130         Blood Sugar Average: Last 72 hrs:  (P) [id number]  C/w Humalog AC  C/w ISS    * Diabetic foot ulcer with osteomyelitis (HCC)  Assessment & Plan  Tx to [facility] for vascular eval/ongoing podiatry eval   Will need bypass w/ vascular before podiatry takes to OR   Podiatry and vascular d/w pt and family, recommending BKA.  Procedure pending for today  Cardio provided clearance for any procedures needed  RLE NWB  ID eval at [facility] and rec to watch off abx             VTE Pharmacologic Prophylaxis: VTE Score: 4 Moderate Risk (Score 3-4) - Pharmacological DVT Prophylaxis Ordered: heparin.    Mobility:   Basic Mobility Inpatient Raw Score: 19  JH-HLM Goal: 6: Walk 10 steps or more  JH-HLM Achieved: 4: Move to chair/commode  HLM Goal achieved. Continue to encourage appropriate mobility.    Patient Centered Rounds: I performed bedside rounds with nursing staff today.   Discussions with Specialists or Other Care Team Provider:     Education and Discussions with Family / Patient: Updated  (daughter and significant other) at bedside.    Total Time Spent on Date of Encounter in care of patient:  mins. This time was spent on  one or more of the following: performing physical exam; counseling and coordination of care; obtaining or reviewing history; documenting in the medical record; reviewing/ordering tests, medications or procedures; communicating with other healthcare professionals and discussing with patient's family/caregivers.    Current Length of Stay: 5 day(s)  Current Patient Status: Inpatient   Certification Statement: The patient will continue to require additional inpatient hospital stay due to bka bypass  Discharge Plan: Anticipate discharge in >72 hrs to discharge location to be determined pending rehab evaluations.    Code Status: Level 1 - Full Code    Subjective:   Denies any acute complaints    Objective:     Vitals:   Temp (24hrs), Av.4 °F (36.3 °C), Min:96.4 °F (35.8 °C), Max:98.1 °F (36.7 °C)    Temp:  [96.4 °F (35.8 °C)-98.1 °F (36.7 °C)] 96.4 °F (35.8 °C)  HR:  [58-75] 66  Resp:  [17-18] 18  BP: (124-142)/(64-74) 142/74  SpO2:  [89 %-93 %] 93 %  Body mass index is 27.53 kg/m².     Input and Output Summary (last 24 hours):     Intake/Output Summary (Last 24 hours) at 2024 1343  Last data filed at 2024 0501  Gross per 24 hour   Intake 240 ml   Output 2100 ml   Net -1860 ml       Physical Exam:   Physical Exam  Vitals and nursing note reviewed.   Constitutional:       General: She is not in acute distress.     Appearance: She is well-developed. She is not toxic-appearing or diaphoretic.   HENT:      Head: Normocephalic and atraumatic.   Eyes:      General: No scleral icterus.     Conjunctiva/sclera: Conjunctivae normal.   Cardiovascular:      Rate and Rhythm: Normal rate and regular rhythm.      Heart sounds: No murmur heard.     No friction rub. No gallop.   Pulmonary:      Effort: Pulmonary effort is normal. No respiratory distress.      Breath sounds: Normal breath sounds. No stridor. No wheezing, rhonchi or rales.   Chest:      Chest wall: No tenderness.   Abdominal:      General: There is no  distension.      Palpations: Abdomen is soft. There is no mass.      Tenderness: There is no abdominal tenderness. There is no guarding or rebound.      Hernia: No hernia is present.   Musculoskeletal:         General: No swelling or tenderness.      Cervical back: Neck supple.      Comments: Rt Foot wrapped clean dry intact   Skin:     General: Skin is warm and dry.      Capillary Refill: Capillary refill takes less than 2 seconds.   Neurological:      Mental Status: She is alert and oriented to person, place, and time.   Psychiatric:         Mood and Affect: Mood normal.          Additional Data:     Labs:  Results from last 7 days   Lab Units 01/30/24  0525 01/28/24  0619   WBC Thousand/uL 7.77 8.05   HEMOGLOBIN g/dL 13.0 13.2   HEMATOCRIT % 40.7 40.8   PLATELETS Thousands/uL 316 332   NEUTROS PCT %  --  64   LYMPHS PCT %  --  22   MONOS PCT %  --  7   EOS PCT %  --  6     Results from last 7 days   Lab Units 01/30/24  0525   SODIUM mmol/L 140   POTASSIUM mmol/L 4.4   CHLORIDE mmol/L 103   CO2 mmol/L 30   BUN mg/dL 12   CREATININE mg/dL 0.59*   ANION GAP mmol/L 7   CALCIUM mg/dL 9.6   GLUCOSE RANDOM mg/dL 134         Results from last 7 days   Lab Units 01/31/24  1128 01/31/24  0518 01/30/24  2150 01/30/24  1639 01/30/24  1153 01/30/24  0624 01/29/24  2120 01/29/24  1643 01/29/24  1101 01/29/24  0607 01/28/24  2117 01/28/24  1605   POC GLUCOSE mg/dl 130 132 137 75 114 137 121 84 153* 152* 193* 72               Lines/Drains:  Invasive Devices       Peripheral Intravenous Line  Duration             Peripheral IV 01/29/24 Left;Ventral (anterior) Forearm 2 days              Drain  Duration             Ureteral Internal Stent Left ureter 6 Fr. 72 days                          Imaging: No pertinent imaging reviewed.    Recent Cultures (last 7 days):         Last 24 Hours Medication List:   Current Facility-Administered Medications   Medication Dose Route Frequency Provider Last Rate    [Transfer Hold] acetaminophen   975 mg Oral Q8H [facility] Felipa Foy PA-C      [Transfer Hold] albuterol  2 puff Inhalation Q6H PRN Cristian Cerna DO      [Transfer Hold] AMILoride  5 mg Oral Daily Cristian Cerna DO      [Transfer Hold] aspirin  81 mg Oral Daily Cristian Cerna DO      [Transfer Hold] atorvastatin  10 mg Oral Daily With Dinner Cristian Cerna DO      [Transfer Hold] buPROPion  300 mg Oral Daily Cristian Cerna DO      cefazolin  1,000 mg Intravenous Once Felipa Foy PA-C      [Transfer Hold] clonazePAM  1 mg Oral QPM Cristian Cerna DO      [Transfer Hold] docusate sodium  100 mg Oral BID Cristian Cerna DO      [Transfer Hold] enoxaparin  40 mg Subcutaneous Daily Cristian Cerna DO      [Transfer Hold] fish oil  1,000 mg Oral Daily Cristian Cerna DO      [Transfer Hold] fluticasone-vilanterol  1 puff Inhalation Daily Cristian Cerna DO      heparin (porcine) 2,000 Units, papaverine 60 mg in multi-electrolyte (PLASMALYTE-A/ISOLYTE-S PH 7.4) 500 mL irrigation   Irrigation Once Ana Rosa Chavez MD      [Transfer Hold] HYDROmorphone  0.2 mg Intravenous Q4H PRN Felipa Foy PA-C      [Transfer Hold] insulin lispro  1-5 Units Subcutaneous TID AC Cristian Cerna DO      [Transfer Hold] insulin lispro  1-5 Units Subcutaneous HS Cristian Cerna DO      [Transfer Hold] insulin lispro  5 Units Subcutaneous TID With Meals Cristian Cerna DO      [Transfer Hold] methocarbamol  250 mg Oral Q8H JILLIAN Foy PA-C      [Transfer Hold] nicotine  1 patch Transdermal Daily Cristian Cerna DO      [Transfer Hold] ondansetron  4 mg Intravenous Q6H PRN Cristian Cerna DO      [Transfer Hold] oxybutynin  5 mg Oral Daily Cristian Cerna DO      [Transfer Hold] oxyCODONE  2.5 mg Oral Q4H PRN Felipa Foy PA-C      Or    [Transfer Hold] oxyCODONE  5 mg Oral Q4H PRN Felipa Foy PA-C      [Transfer Hold] pantoprazole  40 mg Oral Early Morning Cristian Cerna DO      [Transfer Hold] polyethylene glycol  17 g Oral  Daily PRN Felipa Foy PA-C      [Transfer Hold] pregabalin  200 mg Oral TID Cristian Cerna DO      [Transfer Hold] senna  1 tablet Oral HS Cristian Cerna DO          Today, Patient Was Seen By: Ming Buchanan DO    **Please Note: This note may have been constructed using a voice recognition system.**

## 2024-01-31 NOTE — ANESTHESIA POSTPROCEDURE EVALUATION
Post-Op Assessment Note    CV Status:  Stable  Pain Score: 0    Pain management: adequate       Mental Status:  Awake   Hydration Status:  Stable   PONV Controlled:  None   Airway Patency:  Patent     Post Op Vitals Reviewed: Yes    No anethesia notable event occurred.    Staff: Anesthesiologist, CRNA               BP   148/62   Temp (!) 97 °F (36.1 °C) (01/31/24 1744)    Pulse 62 (01/31/24 1745)   Resp 20 (01/31/24 1744)    SpO2 95 % (01/31/24 1745)

## 2024-01-31 NOTE — OP NOTE
OPERATIVE REPORT  PATIENT NAME: Lona Cedeno    :  1945  MRN: [id number]  Pt Location: [facility] HYBRID OR ROOM 02    SURGERY DATE: 2024    Surgeons and Role:     * Seth Hoyos MD - Primary     * Narinder Street DO - Assisting     * Shaq Mendosa DO - Fellow    Preop Diagnosis:  PAD (peripheral artery disease) ([facility]) [I73.9]    Post-Op Diagnosis Codes:     * PAD (peripheral artery disease) (HCC) [I73.9]    Procedure(s):  Right - BYPASS AXILLO-FEMORAL. RIGHT WITH 8MM RINGED PTFE GRAFT    Specimen(s):  * No specimens in log *    Estimated Blood Loss:   50 mL    Drains:  Urethral Catheter Latex (Active)   Number of days: 0       Ureteral Internal Stent Left ureter 6 Fr. (Active)   Number of days: 72       Anesthesia Type:   Choice    Operative Indications:  PAD (peripheral artery disease) ([facility]) [I73.9]    78 y.o. with PMH HTN, HLD, COPD, DM, Right Ankle ORIF presenting with AIOD, Right CLTI c exposed, necrotic heel wound. Discussed risk and benefits of intervention.     Operative Findings:    Right axillary to femoral bypass with 8 mm ePTFE    Right femoral endarterectomy and profundoplasty performed with Patch angioplasty using the PTFE bypass graft ruelas    At case conclusion patient had a palpable brachial and radial pulse on the right arm and a palpable popliteal and DP pulse on the right leg.    Complications:   None    Procedure and Technique:    Patient seen and examined in the preoperative holding area the preoperative nursing staff and anesthesiology team.  Patient brought to the hybrid operating room.  Torres catheter was placed.  General anesthesia was induced with endotracheal intubation.  A radial A-line was placed.  Preoperative antibiotics were administered.  A timeout was performed.  The right chest, flank, bilateral groins were prepped and draped in normal sterile fashion.    2 teams worked simultaneously.    A 15 blade scalpel was used to make a linear incision  approximately 1 fingerbreadth below the right clavicle.  Electrocautery was used to dissect the subcutaneous tissue and the pectoral fascia was opened.  A muscle-sparing pectoral exposure was performed.  The subcutaneous fat pad and subclavian vein were retracted inferiorly.  Any significant venous branches were clipped and ligated as appropriate.  The subclavian artery was controlled proximally and distally with Silastic loops.     A 15 blade scalpel was used to make a linear incision overlying the right femoral artery.  Electrocautery was used dissect subcutaneous tissues.  Any significant venous or lymphatic channels were clipped or ligated as appropriate.  The femoral artery was exposed by opening the femoral sheath.  The proximal femoral artery was controlled by reflecting the inguinal ligament with Silastic loop.  This SFA and profunda were controlled with Silastic loops.  There was significant atherosclerotic disease.     Using a [place]iltons tunneler a subcutaneous tunnel tract was made by passing the tunneler from the axillary exposure beneath the pectoralis minor through the right flank to the femoral exposure.  An 8 mm ePTFE was then passed through the [place]iltons tunneler.    The patient was fully heparinized and ACT's were trended throughout the case to maintain therapeutic levels.    2 teams were simultaneously again    The axillary artery was occluded with vascular clamps.  An 11 blade scalpel was used to make a longitudinal arteriotomy.  The PTFE graft was trimmed to an appropriate size with a Rivero scissors.  A end side anastomosis was created using a 5-0 Prolene in a running manner.  Prior to completion of the anastomosis the axillary artery was allowed to forward bleed and backward bleed appropriately.  The anastomosis was completed and a vascular clamp placed on the PTFE.  The anastomosis was hemostatic.  The wound was packed with fibrillar.    The femoral artery was opened with 11 blade and a Rivero scissors  extended the longitudinal arteriotomy onto the profunda.  There was significant atherosclerotic disease.  A femoral endarterectomy and profunda endarterectomy was performed using a spatula and standard endarterectomy technique.  The endpoint was appropriate.  There was backbleeding from the SFA and the profunda.  A large distal anastomosis was fashioned of the PTFE graft to accommodate the endarterectomy arteriotomy.  A end to side anastomosis was then created and a 4 point technique using a 6-0 Prolene.  Prior to completion the femoral artery was allowed to flush as well as the profunda and SFA.  The PTFE bypass graft was allowed to vigorously flushed prior to completion.  The anastomosis was completed.  It was hemostatic.  The wound was packed with fibrillar.      The patient had a palpable brachial and popliteal pulse.    The patient was administered 50 mg of protamine.  A diligent inspection for hemostasis occurred and the wounds were copiously irrigated with normal saline.    The axillary incision pectoral fascia was closed with 2-0 Monocryl.  The subcutaneous tissues were closed with 3-0 Monocryl.  The skin was closed with 4-0 Monocryl and Histoacryl glue.    The femoral sheath and deep layers were reapproximated with 2-0 Monocryl.  The subcutaneous tissues were closed multiple layers with 3-0 Monocryl.  The skin was closed with 4-0 Monocryl and glue.  A Prevena wound VAC system was placed on the groin.    Dr. Hoyos was present and scrubbed throughout the procedure.  All sharps and counts are correct x 2.      Patient Disposition:  PACU         SIGNATURE: Shaq Mendosa,   DATE: January 31, 2024  TIME: 5:37 PM      Vascular Quality Initiative - Supra-Inguinal Bypass    Urgency:  Urgent    Anesthesia: General  Skin Prep:Chlorhexidene    Groin Incision: Right.  Incision Orientation is: Vertical.   Groin Closure Type: Absorbable Subcuticular. Closure Dressing is: Cyanoacrylate Adhesive.. .      Total  Procedure Time:   Event Time In   Procedure Start 01/31/2024 1447   Procedure Closing    Procedure Finish 01/31/2024 1719       EBL: 50 mL    Graft Origin:   Artery: Axillary    Side: right Graft Diameter: 8mm    Graft Recipient 1:    Artery: Common Femoral  Graft Vein Type: PTFE  Side: right   Graft Diameter: 8mm    Concomitant Endarterectomy:  yes      Concomitant Infra-inguinal:   Right PVI:  no  Left PVI:  no   Right Bypass:no Left Bypass:  no     Completion Study:   Doppler: yes   Duplex: no   Arteriogram: no

## 2024-01-31 NOTE — ASSESSMENT & PLAN NOTE
Tx to [facility] for vascular eval/ongoing podiatry eval   Will need bypass w/ vascular before podiatry takes to OR   Podiatry and vascular d/w pt and family, recommending BKA.  Procedure pending for today  Cardio provided clearance for any procedures needed  RLE NWB  ID eval at [facility] and rec to watch off abx

## 2024-02-01 LAB
ANION GAP SERPL CALCULATED.3IONS-SCNC: 4 MMOL/L
BUN SERPL-MCNC: 16 MG/DL (ref 5–25)
CALCIUM SERPL-MCNC: 9.2 MG/DL (ref 8.4–10.2)
CHLORIDE SERPL-SCNC: 106 MMOL/L (ref 96–108)
CO2 SERPL-SCNC: 26 MMOL/L (ref 21–32)
CREAT SERPL-MCNC: 0.58 MG/DL (ref 0.6–1.3)
ERYTHROCYTE [DISTWIDTH] IN BLOOD BY AUTOMATED COUNT: 13.2 % (ref 11.6–15.1)
GFR SERPL CREATININE-BSD FRML MDRD: 89 ML/MIN/1.73SQ M
GLUCOSE SERPL-MCNC: 103 MG/DL (ref 65–140)
GLUCOSE SERPL-MCNC: 123 MG/DL (ref 65–140)
GLUCOSE SERPL-MCNC: 135 MG/DL (ref 65–140)
GLUCOSE SERPL-MCNC: 160 MG/DL (ref 65–140)
GLUCOSE SERPL-MCNC: 162 MG/DL (ref 65–140)
HCT VFR BLD AUTO: 38.2 % (ref 34.8–46.1)
HGB BLD-MCNC: 12 G/DL (ref 11.5–15.4)
MCH RBC QN AUTO: 30.5 PG (ref 26.8–34.3)
MCHC RBC AUTO-ENTMCNC: 31.4 G/DL (ref 31.4–37.4)
MCV RBC AUTO: 97 FL (ref 82–98)
PLATELET # BLD AUTO: 282 THOUSANDS/UL (ref 149–390)
PMV BLD AUTO: 9.7 FL (ref 8.9–12.7)
POTASSIUM SERPL-SCNC: 4.4 MMOL/L (ref 3.5–5.3)
RBC # BLD AUTO: 3.93 MILLION/UL (ref 3.81–5.12)
SODIUM SERPL-SCNC: 136 MMOL/L (ref 135–147)
WBC # BLD AUTO: 9.77 THOUSAND/UL (ref 4.31–10.16)

## 2024-02-01 PROCEDURE — 97535 SELF CARE MNGMENT TRAINING: CPT

## 2024-02-01 PROCEDURE — 80048 BASIC METABOLIC PNL TOTAL CA: CPT | Performed by: STUDENT IN AN ORGANIZED HEALTH CARE EDUCATION/TRAINING PROGRAM

## 2024-02-01 PROCEDURE — 99232 SBSQ HOSP IP/OBS MODERATE 35: CPT | Performed by: PHYSICIAN ASSISTANT

## 2024-02-01 PROCEDURE — 82948 REAGENT STRIP/BLOOD GLUCOSE: CPT

## 2024-02-01 PROCEDURE — 99222 1ST HOSP IP/OBS MODERATE 55: CPT | Performed by: STUDENT IN AN ORGANIZED HEALTH CARE EDUCATION/TRAINING PROGRAM

## 2024-02-01 PROCEDURE — 85027 COMPLETE CBC AUTOMATED: CPT | Performed by: STUDENT IN AN ORGANIZED HEALTH CARE EDUCATION/TRAINING PROGRAM

## 2024-02-01 PROCEDURE — 99024 POSTOP FOLLOW-UP VISIT: CPT | Performed by: SURGERY

## 2024-02-01 PROCEDURE — 99233 SBSQ HOSP IP/OBS HIGH 50: CPT | Performed by: PODIATRIST

## 2024-02-01 PROCEDURE — 99232 SBSQ HOSP IP/OBS MODERATE 35: CPT | Performed by: INTERNAL MEDICINE

## 2024-02-01 RX ORDER — ENOXAPARIN SODIUM 100 MG/ML
1 INJECTION SUBCUTANEOUS DAILY
Status: DISCONTINUED | OUTPATIENT
Start: 2024-02-02 | End: 2024-02-01

## 2024-02-01 RX ORDER — ENOXAPARIN SODIUM 100 MG/ML
1 INJECTION SUBCUTANEOUS EVERY 12 HOURS SCHEDULED
Status: DISCONTINUED | OUTPATIENT
Start: 2024-02-01 | End: 2024-02-03

## 2024-02-01 RX ADMIN — ENOXAPARIN SODIUM 70 MG: 80 INJECTION SUBCUTANEOUS at 21:19

## 2024-02-01 RX ADMIN — METHOCARBAMOL 250 MG: 500 TABLET ORAL at 15:16

## 2024-02-01 RX ADMIN — ACETAMINOPHEN 975 MG: 325 TABLET, FILM COATED ORAL at 15:16

## 2024-02-01 RX ADMIN — BUPROPION HYDROCHLORIDE 300 MG: 150 TABLET, FILM COATED, EXTENDED RELEASE ORAL at 10:04

## 2024-02-01 RX ADMIN — HEPARIN SODIUM 5000 UNITS: 5000 INJECTION INTRAVENOUS; SUBCUTANEOUS at 05:10

## 2024-02-01 RX ADMIN — ACETAMINOPHEN 975 MG: 325 TABLET, FILM COATED ORAL at 05:09

## 2024-02-01 RX ADMIN — ATORVASTATIN CALCIUM 10 MG: 10 TABLET, FILM COATED ORAL at 17:16

## 2024-02-01 RX ADMIN — INSULIN LISPRO 5 UNITS: 100 INJECTION, SOLUTION INTRAVENOUS; SUBCUTANEOUS at 12:12

## 2024-02-01 RX ADMIN — OXYBUTYNIN CHLORIDE 5 MG: 5 TABLET, EXTENDED RELEASE ORAL at 10:04

## 2024-02-01 RX ADMIN — PREGABALIN 200 MG: 100 CAPSULE ORAL at 15:16

## 2024-02-01 RX ADMIN — CLONAZEPAM 1 MG: 1 TABLET ORAL at 17:16

## 2024-02-01 RX ADMIN — OXYCODONE HYDROCHLORIDE 5 MG: 5 TABLET ORAL at 21:20

## 2024-02-01 RX ADMIN — SODIUM CHLORIDE 75 ML/HR: 0.9 INJECTION, SOLUTION INTRAVENOUS at 05:05

## 2024-02-01 RX ADMIN — METHOCARBAMOL 250 MG: 500 TABLET ORAL at 05:09

## 2024-02-01 RX ADMIN — ACETAMINOPHEN 975 MG: 325 TABLET, FILM COATED ORAL at 21:19

## 2024-02-01 RX ADMIN — DOCUSATE SODIUM 100 MG: 100 CAPSULE, LIQUID FILLED ORAL at 17:16

## 2024-02-01 RX ADMIN — CEFAZOLIN SODIUM 1000 MG: 1 SOLUTION INTRAVENOUS at 05:03

## 2024-02-01 RX ADMIN — SENNOSIDES 8.6 MG: 8.6 TABLET, FILM COATED ORAL at 21:19

## 2024-02-01 RX ADMIN — OXYCODONE HYDROCHLORIDE 5 MG: 5 TABLET ORAL at 15:17

## 2024-02-01 RX ADMIN — AMILORIDE HYDROCLORIDE 5 MG: 5 TABLET ORAL at 10:06

## 2024-02-01 RX ADMIN — METHOCARBAMOL 250 MG: 500 TABLET ORAL at 21:19

## 2024-02-01 RX ADMIN — OMEGA-3 FATTY ACIDS CAP 1000 MG 1000 MG: 1000 CAP at 10:06

## 2024-02-01 RX ADMIN — NICOTINE 1 PATCH: 21 PATCH, EXTENDED RELEASE TRANSDERMAL at 10:07

## 2024-02-01 RX ADMIN — OXYCODONE HYDROCHLORIDE 5 MG: 5 TABLET ORAL at 10:09

## 2024-02-01 RX ADMIN — ENOXAPARIN SODIUM 40 MG: 40 INJECTION SUBCUTANEOUS at 10:05

## 2024-02-01 RX ADMIN — PANTOPRAZOLE SODIUM 40 MG: 40 TABLET, DELAYED RELEASE ORAL at 10:04

## 2024-02-01 RX ADMIN — HYDROMORPHONE HYDROCHLORIDE 0.2 MG: 0.2 INJECTION, SOLUTION INTRAMUSCULAR; INTRAVENOUS; SUBCUTANEOUS at 12:14

## 2024-02-01 RX ADMIN — ASPIRIN 81 MG: 81 TABLET, COATED ORAL at 10:04

## 2024-02-01 RX ADMIN — PREGABALIN 200 MG: 100 CAPSULE ORAL at 21:20

## 2024-02-01 RX ADMIN — INSULIN LISPRO 1 UNITS: 100 INJECTION, SOLUTION INTRAVENOUS; SUBCUTANEOUS at 21:21

## 2024-02-01 RX ADMIN — DOCUSATE SODIUM 100 MG: 100 CAPSULE, LIQUID FILLED ORAL at 10:04

## 2024-02-01 RX ADMIN — PREGABALIN 200 MG: 100 CAPSULE ORAL at 10:04

## 2024-02-01 NOTE — PROGRESS NOTES
Progress Note - Vascular Surgery   Lona Cedeno 78 y.o. female [id number]  Unit/Bed#:[facility] 503-01 Encounter: [id number]      Assessment:    78 y.o. with PMH HTN, HLD, COPD, DM, Right Ankle ORIF presenting with AIOD, Right CLTI c exposed, necrotic heel wound.     Vascular surgery consulted for AIOD, Right CLTI c exposed necrotic heel wound.     1/31: Right Ax to Fem PTFE bypass    Plan:  - Doing well post-op; viable, well perfused limb with palpable pulses  - Ongoing discussions with podiatry, plastic surgery regarding limb salvage   - Poss staged BKA  - OOB as tolerated  - Diet as tolerated  - Abx SCIPs  - VTEppx: SQH  - Cont Aspirin, Statin  - Will plan for Xarelto 2.5mg BID after surgical course   - Further recommendations pending clinical course     Subjective:    Pt s/e. No acute events overnight. Pt awake, alert.    Tolerating diet. Not OOB. Voiding.      Pain controlled. Admits to pain at surgical site.     No new complaints. Denies n/v, sob, chest pain, f/c. No sensory change, numbness, or weakness of lower extremities    I/Os:  I/O last 3 completed shifts:  In: 3253.8 [P.O.:600; I.V.:2553.8; IV Piggyback:100]  Out: 2105 [Urine:2055; Blood:50]  I/O this shift:  In: 487.5 [P.O.:240; I.V.:247.5]  Out: -     Review of Systems   All other systems reviewed and are negative.      Vitals:    02/01/24 1009   BP:    Pulse:    Resp:    Temp:    SpO2: 94%        Physical Exam  Vitals and nursing note reviewed.   Constitutional:       General: She is not in acute distress.     Appearance: She is well-developed. She is not diaphoretic.   HENT:      Head: Normocephalic and atraumatic.   Eyes:      Conjunctiva/sclera: Conjunctivae normal.   Cardiovascular:      Rate and Rhythm: Normal rate and regular rhythm.      Heart sounds: No murmur heard.     Comments: RUE Palp brachial, radial pulse     LLE non-palp fem  RLE palp DP/PT, Pop  Pulmonary:      Effort: Pulmonary effort is normal. No respiratory distress.      Breath  sounds: Normal breath sounds.   Abdominal:      Palpations: Abdomen is soft.      Tenderness: There is no abdominal tenderness.   Musculoskeletal:         General: Signs of injury present. No swelling.      Cervical back: Neck supple.      Comments: Right heel exposed achilles tendon, no erythema, no drainage     Right chest wall incision soft, c/d/I  Right groin soft, no hematoma, no ecchymosis    Skin:     General: Skin is warm and dry.      Capillary Refill: Capillary refill takes less than 2 seconds.   Neurological:      General: No focal deficit present.      Mental Status: She is alert and oriented to person, place, and time. Mental status is at baseline.   Psychiatric:         Mood and Affect: Mood normal.         Imaging:I have personally reviewed pertinent reports.      Lab Results and Cultures:   Lab Results   Component Value Date    WBC 9.77 02/01/2024    HGB 12.0 02/01/2024    HCT 38.2 02/01/2024    MCV 97 02/01/2024     02/01/2024     Lab Results   Component Value Date    GLUCOSE 143 (H) 01/31/2024    CALCIUM 9.2 02/01/2024     05/21/2018    K 4.4 02/01/2024    CO2 26 02/01/2024     02/01/2024    BUN 16 02/01/2024    CREATININE 0.58 (L) 02/01/2024     Lab Results   Component Value Date    INR 1.03 01/23/2024    INR 1.08 08/11/2023    PROTIME 13.6 01/23/2024    PROTIME 14.1 08/11/2023        Blood Culture:   Lab Results   Component Value Date    BLOODCX No Growth After 5 Days. 01/23/2024   ,   Urinalysis:   Lab Results   Component Value Date    COLORU Yellow 11/20/2023    COLORU YELLOW 05/21/2018    CLARITYU Clear 11/20/2023    CLARITYU CLEAR 05/21/2018    SPECGRAV 1.010 11/20/2023    SPECGRAV 1.015 05/21/2018    PHUR 5.0 11/20/2023    PHUR 6.0 05/21/2018    LEUKOCYTESUR Negative 11/20/2023    LEUKOCYTESUR NEGATIVE 05/21/2018    NITRITE Negative 11/20/2023    NITRITE NEGATIVE 05/21/2018    PROTEINUA NEGATIVE 05/21/2018    GLUCOSEU Negative 11/20/2023    GLUCOSEU NEGATIVE 05/21/2018     KETONESU Negative 11/20/2023    KETONESU NEGATIVE 05/21/2018    BILIRUBINUR Negative 11/20/2023    BILIRUBINUR NEGATIVE 05/21/2018    BLOODU Negative 11/20/2023    BLOODU NEGATIVE 05/21/2018   ,   Urine Culture:   Lab Results   Component Value Date    URINECX No Growth <1000 cfu/mL 11/20/2023   ,   Wound Culure:   Lab Results   Component Value Date    WOUNDCULT 1+ Growth of 01/23/2024       Medications:  Current Facility-Administered Medications   Medication Dose Route Frequency    acetaminophen (TYLENOL) tablet 975 mg  975 mg Oral Q8H JILLIAN    albuterol (PROVENTIL HFA,VENTOLIN HFA) inhaler 2 puff  2 puff Inhalation Q6H PRN    AMILoride tablet 5 mg  5 mg Oral Daily    aspirin (ECOTRIN LOW STRENGTH) EC tablet 81 mg  81 mg Oral Daily    atorvastatin (LIPITOR) tablet 10 mg  10 mg Oral Daily With Dinner    buPROPion (WELLBUTRIN XL) 24 hr tablet 300 mg  300 mg Oral Daily    clonazePAM (KlonoPIN) tablet 1 mg  1 mg Oral QPM    docusate sodium (COLACE) capsule 100 mg  100 mg Oral BID    enoxaparin (LOVENOX) subcutaneous injection 40 mg  40 mg Subcutaneous Daily    fish oil capsule 1,000 mg  1,000 mg Oral Daily    fluticasone-vilanterol 200-25 mcg/actuation 1 puff  1 puff Inhalation Daily    heparin (porcine) subcutaneous injection 5,000 Units  5,000 Units Subcutaneous Q8H JILLIAN    HYDROmorphone HCl (DILAUDID) injection 0.2 mg  0.2 mg Intravenous Q4H PRN    insulin lispro (HumaLOG) 100 units/mL subcutaneous injection 1-5 Units  1-5 Units Subcutaneous TID AC    insulin lispro (HumaLOG) 100 units/mL subcutaneous injection 1-5 Units  1-5 Units Subcutaneous HS    insulin lispro (HumaLOG) 100 units/mL subcutaneous injection 5 Units  5 Units Subcutaneous TID With Meals    methocarbamol (ROBAXIN) tablet 250 mg  250 mg Oral Q8H JILLIAN    nicotine (NICODERM CQ) 21 mg/24 hr TD 24 hr patch 1 patch  1 patch Transdermal Daily    ondansetron (ZOFRAN) injection 4 mg  4 mg Intravenous Q6H PRN    oxybutynin (DITROPAN-XL) 24 hr tablet 5 mg  5 mg  Oral Daily    oxyCODONE (ROXICODONE) split tablet 2.5 mg  2.5 mg Oral Q4H PRN    Or    oxyCODONE (ROXICODONE) IR tablet 5 mg  5 mg Oral Q4H PRN    pantoprazole (PROTONIX) EC tablet 40 mg  40 mg Oral Early Morning    polyethylene glycol (MIRALAX) packet 17 g  17 g Oral Daily PRN    pregabalin (LYRICA) capsule 200 mg  200 mg Oral TID    senna (SENOKOT) tablet 8.6 mg  1 tablet Oral HS       Patient Active Problem List   Diagnosis    Type 2 diabetes mellitus with diabetic polyneuropathy (HCC)    Essential hypertension    Anxiety and depression    Medicare annual wellness visit, subsequent    GERD without esophagitis    Urinary incontinence    Degenerative lumbar disc    Lactic acidosis    Hypomagnesemia    Bladder neoplasm    Left renal mass    PAD (peripheral artery disease) (HCC)    Abnormal CT of the chest    COPD, severity to be determined (HCC)    Tobacco use disorder    Age-related osteoporosis without current pathological fracture    Ankle ulcer, right, with fat layer exposed (HCC)    Diabetic foot ulcer with osteomyelitis ([facility])    Preoperative cardiovascular examination    Constipation       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;  Surgeon: Cristian Child MD;  Location: [facility];  Service: Urology    FL RETROGRADE PYELOGRAM  2023    HERNIA REPAIR      umbilicus w/ mesh       Family History   Problem Relation Age of Onset    COPD Mother     Emphysema Mother     COPD Father     Emphysema Father        Social History     Socioeconomic  History    Marital status: /Civil Union     Spouse name: Not on file    Number of children: Not on file    Years of education: Not on file    Highest education level: Not on file   Occupational History    Not on file   Tobacco Use    Smoking status: Every Day     Current packs/day: 1.00     Average packs/day: 1 pack/day for 63.1 years (63.1 ttl pk-yrs)     Types: Cigarettes     Start date: 1961    Smokeless tobacco: Never    Tobacco comments:     11/14/23 smokes about 3/4 PPD   Vaping Use    Vaping status: Never Used   Substance and Sexual Activity    Alcohol use: Not Currently    Drug use: Never    Sexual activity: Not on file   Other Topics Concern    Not on file   Social History Narrative    Not on file     Social Determinants of Health     Financial Resource Strain: Low Risk  (2/9/2023)    Overall Financial Resource Strain (CARDIA)     Difficulty of Paying Living Expenses: Not hard at all   Food Insecurity: No Food Insecurity (1/24/2024)    Hunger Vital Sign     Worried About Running Out of Food in the Last Year: Never true     Ran Out of Food in the Last Year: Never true   Transportation Needs: No Transportation Needs (1/24/2024)    PRAPARE - Transportation     Lack of Transportation (Medical): No     Lack of Transportation (Non-Medical): No   Physical Activity: Not on file   Stress: Not on file   Social Connections: Not on file   Intimate Partner Violence: Not on file   Housing Stability: Low Risk  (1/24/2024)    Housing Stability Vital Sign     Unable to Pay for Housing in the Last Year: No     Number of Places Lived in the Last Year: 1     Unstable Housing in the Last Year: No       Allergies   Allergen Reactions    Paroxetine Other (See Comments)     Became suicidal    Adhesive [Medical Tape] Itching and Blisters    Carafate [Sucralfate] Other (See Comments)     Mouth sores, tongue sore    Sulfa Antibiotics Hives and Rash    Sulfamethoxazole-Trimethoprim Hives

## 2024-02-01 NOTE — PROGRESS NOTES
[facility]  Progress Note  Name: Lona Cedeno I  MRN: [id number]  Unit/Bed#: [facility] 503-01 I Date of Admission: 1/26/2024   Date of Service: 2/1/2024 I Hospital Day: 6    Assessment/Plan   Constipation  Assessment & Plan  Bowel regimen   Reports she had BM 1/29     Tobacco use disorder  Assessment & Plan  Nicotine TD    PAD (peripheral artery disease) (HCC)  Assessment & Plan  ASA and Lipitor  LDL 80  Vascular consulted for potential bypass surgery with wounds as above   Cardio at [facility] cleared for bypass    Type 2 diabetes mellitus with diabetic polyneuropathy ([facility])  Assessment & Plan  Lab Results   Component Value Date    HGBA1C 6.5 (H) 11/07/2023       Recent Labs     01/31/24  2042 02/01/24  0557 02/01/24  1116 02/01/24  1607   POCGLU 118 160* 123 103         Blood Sugar Average: Last 72 hrs:  (P) [id number]  C/w Humalog AC  C/w ISS    * Diabetic foot ulcer with osteomyelitis (HCC)  Assessment & Plan  Tx to [facility] for vascular eval/ongoing podiatry eval   Will need bypass w/ vascular before podiatry takes to OR   Podiatry and vascular d/w pt and family, patient sp bypass with vascular, she is still undecideded about proceeding to bka  Cardio provided clearance for any procedures needed  RLE NWB  ID eval at [facility] and rec to watch off abx               VTE Pharmacologic Prophylaxis: VTE Score: 4 Moderate Risk (Score 3-4) - Pharmacological DVT Prophylaxis Ordered: enoxaparin (Lovenox).    Mobility:   Basic Mobility Inpatient Raw Score: 19  JH-HLM Goal: 6: Walk 10 steps or more  JH-HLM Achieved: 4: Move to chair/commode  HLM Goal achieved. Continue to encourage appropriate mobility.    Patient Centered Rounds: I performed bedside rounds with nursing staff today.   Discussions with Specialists or Other Care Team Provider:     Education and Discussions with Family / Patient: Patient declined call to .     Total Time Spent on Date of Encounter in care of patient:   mins. This time was spent on one or more of the following: performing physical exam; counseling and coordination of care; obtaining or reviewing history; documenting in the medical record; reviewing/ordering tests, medications or procedures; communicating with other healthcare professionals and discussing with patient's family/caregivers.    Current Length of Stay: 6 day(s)  Current Patient Status: Inpatient   Certification Statement: The patient will continue to require additional inpatient hospital stay due to    Discharge Plan: Anticipate discharge in 48-72 hrs to discharge location to be determined pending rehab evaluations.    Code Status: Level 1 - Full Code    Subjective:   Patient denies any acute complaints    Objective:     Vitals:   Temp (24hrs), Av.2 °F (36.8 °C), Min:97 °F (36.1 °C), Max:99.9 °F (37.7 °C)    Temp:  [97 °F (36.1 °C)-99.9 °F (37.7 °C)] 99.9 °F (37.7 °C)  HR:  [58-86] 86  Resp:  [12-20] 18  BP: (104-143)/(52-66) 115/56  SpO2:  [92 %-98 %] 94 %  Body mass index is 27.53 kg/m².     Input and Output Summary (last 24 hours):     Intake/Output Summary (Last 24 hours) at 2024 1616  Last data filed at 2024 1513  Gross per 24 hour   Intake 2641.25 ml   Output 350 ml   Net 2291.25 ml       Physical Exam:   Physical Exam  Vitals and nursing note reviewed.   Constitutional:       General: She is not in acute distress.     Appearance: She is well-developed. She is not toxic-appearing or diaphoretic.   HENT:      Head: Normocephalic and atraumatic.   Eyes:      General: No scleral icterus.     Conjunctiva/sclera: Conjunctivae normal.   Cardiovascular:      Rate and Rhythm: Normal rate and regular rhythm.      Heart sounds: No murmur heard.     No friction rub. No gallop.   Pulmonary:      Effort: Pulmonary effort is normal. No respiratory distress.      Breath sounds: Normal breath sounds. No stridor. No wheezing, rhonchi or rales.   Chest:      Chest wall: No tenderness.   Abdominal:       General: There is no distension.      Palpations: Abdomen is soft. There is no mass.      Tenderness: There is no abdominal tenderness. There is no guarding or rebound.      Hernia: No hernia is present.   Musculoskeletal:         General: No swelling or tenderness.      Cervical back: Neck supple.      Comments: Lower extremity wound   Leg wrapped c/d/i   Skin:     General: Skin is warm and dry.      Capillary Refill: Capillary refill takes less than 2 seconds.   Neurological:      Mental Status: She is alert and oriented to person, place, and time.   Psychiatric:         Mood and Affect: Mood normal.          Additional Data:     Labs:  Results from last 7 days   Lab Units 02/01/24  0509 01/30/24  0525 01/28/24  0619   WBC Thousand/uL 9.77   < > 8.05   HEMOGLOBIN g/dL 12.0   < > 13.2   I STAT HEMOGLOBIN   --    < >  --    HEMATOCRIT % 38.2   < > 40.8   HEMATOCRIT, ISTAT   --    < >  --    PLATELETS Thousands/uL 282   < > 332   NEUTROS PCT %  --   --  64   LYMPHS PCT %  --   --  22   MONOS PCT %  --   --  7   EOS PCT %  --   --  6    < > = values in this interval not displayed.     Results from last 7 days   Lab Units 02/01/24  0509   SODIUM mmol/L 136   POTASSIUM mmol/L 4.4   CHLORIDE mmol/L 106   CO2 mmol/L 26   BUN mg/dL 16   CREATININE mg/dL 0.58*   ANION GAP mmol/L 4   CALCIUM mg/dL 9.2   GLUCOSE RANDOM mg/dL 135         Results from last 7 days   Lab Units 02/01/24  1607 02/01/24  1116 02/01/24  0557 01/31/24  2042 01/31/24  1750 01/31/24  1128 01/31/24  0518 01/30/24  2150 01/30/24  1639 01/30/24  1153 01/30/24  0624 01/29/24  2120   POC GLUCOSE mg/dl 103 123 160* 118 132 130 132 137 75 114 137 121               Lines/Drains:  Invasive Devices       Peripheral Intravenous Line  Duration             Peripheral IV 01/29/24 Left;Ventral (anterior) Forearm 3 days              Drain  Duration             Ureteral Internal Stent Left ureter 6 Fr. 73 days                          Imaging: No pertinent imaging  reviewed.    Recent Cultures (last 7 days):         Last 24 Hours Medication List:   Current Facility-Administered Medications   Medication Dose Route Frequency Provider Last Rate    acetaminophen  975 mg Oral Q8H [facility] Dagoberto, PA-C      albuterol  2 puff Inhalation Q6H PRN Sandra Hung, PA-C      AMILoride  5 mg Oral Daily Sandra Hung, PA-C      aspirin  81 mg Oral Daily Sandra Hung, PA-C      atorvastatin  10 mg Oral Daily With Dinner Sandrajahaira Arenas, PA-C      buPROPion  300 mg Oral Daily Sandra Dagobreto, PA-C      clonazePAM  1 mg Oral QPM Sandra Dagoberto, PA-C      docusate sodium  100 mg Oral BID Sandra Dagoberto, PA-C      enoxaparin  1 mg/kg Subcutaneous Q12H [facility] Ming Buchanan DO      fish oil  1,000 mg Oral Daily Sandra Hung, PA-C      fluticasone-vilanterol  1 puff Inhalation Daily Sandra Dagoberto, PA-C      HYDROmorphone  0.2 mg Intravenous Q4H PRN Sandra Dagoberto, PA-C      insulin lispro  1-5 Units Subcutaneous TID AC Sandra Hung, PA-C      insulin lispro  1-5 Units Subcutaneous HS Sandra Hung, PA-C      insulin lispro  5 Units Subcutaneous TID With Meals Sandrajahaira Arenas, PA-C      methocarbamol  250 mg Oral Q8H [facility], PA-C      nicotine  1 patch Transdermal Daily Sandra Hung, PA-C      ondansetron  4 mg Intravenous Q6H PRN Sandra Dagoberto, PA-C      oxybutynin  5 mg Oral Daily Sandra Hung, PA-C      oxyCODONE  2.5 mg Oral Q4H PRN Sandra Dagoberto, PA-C      Or    oxyCODONE  5 mg Oral Q4H PRN Sandra Dagoberto, PA-C      pantoprazole  40 mg Oral Early Morning Sandra Dagoberto, PA-C      polyethylene glycol  17 g Oral Daily PRN Sandra Dagoberto, PA-C      pregabalin  200 mg Oral TID Sandra Dagoberto, PA-C      senna  1 tablet [facility]          Today, Patient Was Seen By: Ming Buchanan DO    **Please Note: This note may have been constructed using a voice recognition system.**

## 2024-02-01 NOTE — DISCHARGE INSTR - AVS FIRST PAGE
DISCHARGE INSTRUCTIONS  LEG/BYPASS SURGERY    ACTIVITY:   Limit your physical activity to walking for the first week and then increase your activity as tolerated.  If you become short of breath or tired, stop and rest.  You may require help with walking or feel more secure with something to lean on.  Walking up steps and normal activities may be resumed as you feel ready.  Most people tire easily for the first few weeks following leg surgery.  This improves as conditioning returns.  Avoid strenuous activity such as vigorous exercise.  Avoid heavy lifting (do not lift more than 15 pounds) for the first four weeks after surgery.  You should not drive a car for at least two weeks following discharge from the hospital and you are off all narcotic pain medication.  You may ride in a car.    DIET:  Resume your normal diet.  Good nutrition is important for healing of your incision.  If you are discharged on narcotics for pain control, continue taking your stool softeners until you are having regular bowel movements.    INCISION:  You should shower daily.  Wash incision daily with soap and water, but do not rub or scrub the incision; rinse thoroughly and pat dry.  You may have stitches or staples to close your incision and it is okay for these to get wet.  Do not bathe in a tub or swim for the first 4 week following surgery or if you have any open wounds.  It is normal to have swelling or discoloration around the incision.   If increasing redness or pain develops, call our office immediately.  Numbness in the region of the incision may occur following the surgery.  This normally improves over six to twelve months.    You may have surgical glue over your incisions. There are stitches present under the skin which will absorb on their own.   The glue is used to cover the access, assist in closure, and prevent contamination. This adhesive will darken and peel away on its own within one to two weeks. Do not pick at it.    If you  have a groin wound/incision, place a clean dry piece of gauze to cover your groin incision to keep incision clean and dry and prevent your skin from sticking together.  Change gauze daily.  You may have staples or stitches at your incisions.  These will be removed at your follow-up appointment or when they are ready to come out.  If you have a dressing over your surgical site, remove this on the second day after surgery.  If you have foot or leg wounds, please follow your podiatrist/wound care doctor's instructions for care.  If any of your incisions are open and require dressing changes, you will be given instructions for your daily incision care. If you are not able to change the dressings, a visiting nurse will be arranged.    DO NOT put any powders, creams, ointments, or lotions on your incision.    LEG SWELLING: Most patients have noticeable leg swelling after leg surgery. This usually improves within a few weeks. If swelling is present, elevate the leg whenever possible. Avoid sitting with the leg hanging down for prolonged periods of time.  Walking is beneficial.  An ACE bandage or support stocking may be helpful, but this should be discussed with your physician prior to use if you have a bypass.    FOLLOW UP STUDIES:  Doppler ultrasound studies are very important for long-term management.  Your surgeon will arrange this at your first postoperative visit.  Repeat studies are then scheduled every three months for the first year and periodically after this.    FOLLOW UP APPOINTMENTS:  Making and keeping follow up appointments and ultrasound tests are important to your recovery.  If you have difficulty making it to or keeping your follow up appointments, call the office.    If you have increased pain, fever >101.5, increased drainage, redness or a bad smell at your surgery site, new coldness/numbness of your arm or leg, please call us immediately and GO directly to the ER.    Debbie painting/ Dr. Castañeda Doctor: 2/20/24 at  [facility], [place] office      PLEASE CALL THE OFFICE IF YOU HAVE ANY QUESTIONS  [phone]  [phone] [address], [place] [address]  [address] [facility], [address], [place] [address]  [address] [place] [address]  [address] [facility] [place] [address]  [address], [place] [address]  [address] [facility], [address], [address], [place] [address]  [address] [facility], [address], [place] [address]  [address], [address], [address], [place] [address]  [address] [facility] [address]  [address] [address]       Discharge Instructions - Podiatry    Weight Bearing Status: Non-weight bearing to right lower extremity                   Pain: Continue analgesics as needed    Follow-up appointment instructions: Please make an appointment within one week of discharge with Dr. Gann/ [facility] . Contact sooner if any increase in pain, or signs of infection occur    Wound Care: Leave dressings clean, dry, and intact between professional dressing changes    Nursing Instructions:  Wound Vac: Please apply a black sponge fit to the size of the right ankle wound to cover the Achilles tendon.  Please cover with the adhesive and the track pad.  Set the wound VAC settings to 125 mmHg continuous. This can be secured with gauze, kerlex, and an ACE wrap. Please change every Monday, Wednesday, and Friday.     Right 2nd toe incision: Please apply Betadine paint to right second toe incision, cover with Adaptic.  Then cover with gauze and secure with Alicia.  Please change dressing 3 times a week

## 2024-02-01 NOTE — ASSESSMENT & PLAN NOTE
Lab Results   Component Value Date    HGBA1C 6.5 (H) 11/07/2023       Recent Labs     01/31/24  2042 02/01/24  0557 02/01/24  1116 02/01/24  1607   POCGLU 118 160* 123 103         Blood Sugar Average: Last 72 hrs:  (P) [id number]  C/w Humalog AC  C/w ISS
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
## 2024-02-01 NOTE — PLAN OF CARE
Problem: OCCUPATIONAL THERAPY ADULT  Goal: Performs self-care activities at highest level of function for planned discharge setting.  See evaluation for individualized goals.  Description: Treatment Interventions: ADL retraining, Functional transfer training, Endurance training, UE strengthening/ROM, Patient/family training, Equipment evaluation/education, Compensatory technique education, Continued evaluation, Energy conservation, Activityengagement          See flowsheet documentation for full assessment, interventions and recommendations.   Outcome: Progressing  Note: Limitation: Decreased ADL status, Decreased Safe judgement during ADL, Decreased endurance, Decreased self-care trans, Decreased high-level ADLs  Prognosis: Good  Assessment: Pt seen on this date for OT session focusing on ADL retraining, body mechanics, transfer retraining, increasing activity tolerance/endurance and EOB sitting to increase ability to participate in ADL/functional tasks. Pt was found in bed and was left in chair w/ all needs within reach, chair alarm on. Pt completed transfers and FM w Mod Ax1 c rw, vc for adherence to WBS. Sat EOB w for ADLs: UB: min A, LB: Mod A. See above for further detail. Pt w/ improvements in activity tolerance, transfer ability, adl task completion, however is still limited 2* decreased ADL/High-level ADL status, decreased activity tolerance/endurance, decreased self-care trans.   The patient's raw score on the AM-PAC Daily Activity Inpatient Short Form is 19. A raw score of greater than or equal to 19 suggests the patient may benefit from discharge to post-acute rehabilitation services. Please refer to the recommendation of the Occupational Therapist for safe discharge planning.       Recommending pt D/C to STR when medically stable. Pt will continue to benefit from acute OT services to meet goals.     Rehab Resource Intensity Level, OT: I (Maximum Resource Intensity)

## 2024-02-01 NOTE — ASSESSMENT & PLAN NOTE
Tx to [facility] for vascular eval/ongoing podiatry eval   Will need bypass w/ vascular before podiatry takes to OR   Podiatry and vascular d/w pt and family, patient sp bypass with vascular, she is still undecideded about proceeding to bka  Cardio provided clearance for any procedures needed  RLE NWB  [place] eval at [facility] and rec to watch off abx

## 2024-02-01 NOTE — PROGRESS NOTES
Progress Note - Acute Pain Service    Lona Cedeno 78 y.o. female MRN: [id number]  Unit/Bed#: [facility] 503-01 Encounter: [id number]      Lona Cedeno is a 78 y.o. female with past medical history of severe PAD now with nonhealing right foot wounds status post right axillofemoral bypass on 1/31/2024.    Tobacco use disorder  Assessment & Plan  Smokers have been shown to require higher doses of opioid pain medication and are more likely to develop chronic pain.  Encourage smoking cessation.      COPD, severity to be determined (HCC)  Assessment & Plan  Patients with underlying pulmonary disease are at higher risk for respiratory compromise with opioid pain medications.  Monitor respiratory status closely and minimize use of opioids as possible.      PAD (peripheral artery disease) ([facility])  Assessment & Plan  Status post right axillobifemoral bypass on 1/31/2024 -did not require right BKA as tentatively planned.  Takes Lyrica 200 mg p.o. 3 times daily for mild chronic back pain prior to admission.  Tylenol 975 mg p.o. every 8 hours scheduled.  Lyrica 200 mg p.o. 3 times daily.  Robaxin 250 mg p.o. every 8 hours scheduled.  Oxycodone 2.5 mg p.o. every 4 hours as needed moderate pain or 5 mg p.o. every 4 hours as needed severe pain.  Dilaudid 0.2 mg IV every 4 hours as needed breakthrough pain and discontinue by 2-20 4 AM.  Continue bowel regimen to avoid opioid-induced constipation while on opioid pain medication.  Narcan as needed in setting of anticipated use of opioid pain medication with ordered benzodiazepine.  At discharge, suggest the following:  Lyrica 200 mg p.o. 3 times daily.  Tylenol 975 mg p.o. every 8 hours as needed mild pain.  Robaxin 250 mg p.o. every 8 hours as needed muscle spasm x 3 days, then discontinue.  Oxycodone 2.5 mg p.o. every 6 hours as needed severe pain x 3 days, then discontinue.  Bowel regimen to avoid opioid-induced constipation while on opioid pain medication.    Anxiety and  depression  Assessment & Plan  Patients with depression and/or anxiety have more perceived pain on average and often require higher doses of opioid pain medication.  Treat depression and/or anxiety aggressively.          APS will sign off at this time. Thank you for the consult. All opioids and other analgesics to be written at discretion of primary team. Please contact Acute Pain Service - via [employer] from [phone] with additional questions or concerns. See Gezlongt or Jordana for additional contacts and after hours information.    Pain History  Current pain location(s):  Pain Score: 9  Pain Location/Orientation: Orientation: Right, Location: Groin  Pain Scale: Pain Assessment Tool: 0-10  24 hour history: Patient complains of tolerable pain in the right groin and right upper chest.  No lower extremity pain.  States pain is mild to moderate at best and well-controlled on current pain regimen.    Opioid requirement previous 24 hours: Oxycodone 2.5 mg p.o. x 1, Dilaudid 0.5 mg IV x 1, fentanyl 200 mcg IV in PACU    Meds/Allergies   all current active meds have been reviewed, current meds:   Current Facility-Administered Medications   Medication Dose Route Frequency    acetaminophen (TYLENOL) tablet 975 mg  975 mg Oral Q8H JILLIAN    albuterol (PROVENTIL HFA,VENTOLIN HFA) inhaler 2 puff  2 puff Inhalation Q6H PRN    AMILoride tablet 5 mg  5 mg Oral Daily    aspirin (ECOTRIN LOW STRENGTH) EC tablet 81 mg  81 mg Oral Daily    atorvastatin (LIPITOR) tablet 10 mg  10 mg Oral Daily With Dinner    buPROPion (WELLBUTRIN XL) 24 hr tablet 300 mg  300 mg Oral Daily    clonazePAM (KlonoPIN) tablet 1 mg  1 mg Oral QPM    docusate sodium (COLACE) capsule 100 mg  100 mg Oral BID    enoxaparin (LOVENOX) subcutaneous injection 70 mg  1 mg/kg Subcutaneous Q12H JILLIAN    fish oil capsule 1,000 mg  1,000 mg Oral Daily    fluticasone-vilanterol 200-25 mcg/actuation 1 puff  1 puff Inhalation Daily    HYDROmorphone HCl (DILAUDID) injection 0.2  mg  0.2 mg Intravenous Q4H PRN    insulin lispro (HumaLOG) 100 units/mL subcutaneous injection 1-5 Units  1-5 Units Subcutaneous TID AC    insulin lispro (HumaLOG) 100 units/mL subcutaneous injection 1-5 Units  1-5 Units Subcutaneous HS    insulin lispro (HumaLOG) 100 units/mL subcutaneous injection 5 Units  5 Units Subcutaneous TID With Meals    methocarbamol (ROBAXIN) tablet 250 mg  250 mg Oral Q8H JILLIAN    nicotine (NICODERM CQ) 21 mg/24 hr TD 24 hr patch 1 patch  1 patch Transdermal Daily    ondansetron (ZOFRAN) injection 4 mg  4 mg Intravenous Q6H PRN    oxybutynin (DITROPAN-XL) 24 hr tablet 5 mg  5 mg Oral Daily    oxyCODONE (ROXICODONE) split tablet 2.5 mg  2.5 mg Oral Q4H PRN    Or    oxyCODONE (ROXICODONE) IR tablet 5 mg  5 mg Oral Q4H PRN    pantoprazole (PROTONIX) EC tablet 40 mg  40 mg Oral Early Morning    polyethylene glycol (MIRALAX) packet 17 g  17 g Oral Daily PRN    pregabalin (LYRICA) capsule 200 mg  200 mg Oral TID    senna (SENOKOT) tablet 8.6 mg  1 tablet Oral HS   , and PTA meds:   Prior to Admission Medications   Prescriptions Last Dose Informant Patient Reported? Taking?   AMILoride 5 mg tablet   No No   Sig: Take 1 tablet (5 mg total) by mouth daily   Apoaequorin (Prevagen) 10 MG CAPS   Yes No   Sig: Take by mouth   Aspirin (ASPIR-81 PO) 1/25/2024 Self Yes Yes   Sig: take one by mouth daily   Blood Glucose Monitoring Suppl (ONETOUCH VERIO) w/Device KIT  Self No No   Sig: by Does not apply route 2 (two) times a day   Fluticasone-Salmeterol (Advair Diskus) 250-50 mcg/dose inhaler   No No   Sig: Inhale 1 puff 2 (two) times a day Rinse mouth after use.   Januvia 100 MG tablet   No No   Sig: Take 1 tablet (100 mg total) by mouth daily   Lancets (OneTouch Delica Plus [id number]) MISC   No No   Sig: TEST TWO TIMES A DAY   Misc. Devices (Carex Coccyx Cushion) MISC   No No   Sig: Use in the morning   Omega-3 Fatty Acids (FISH OIL PO)  Self Yes No   acetaminophen (TYLENOL) 325 mg tablet  Self Yes No    Sig: Take 650 mg by mouth every 6 (six) hours as needed for mild pain   albuterol (Ventolin HFA) 90 mcg/act inhaler   No No   Sig: Inhale 2 puffs every 6 (six) hours as needed for wheezing   atorvastatin (LIPITOR) 10 mg tablet   No No   Sig: Take 1 tablet (10 mg total) by mouth daily with dinner   buPROPion (WELLBUTRIN XL) 300 mg 24 hr tablet   No No   Sig: Take 1 tablet (300 mg total) by mouth daily   clonazePAM (KlonoPIN) 1 mg tablet   No No   Sig: Take 1 tablet (1 mg total) by mouth every evening   glucose blood (OneTouch Verio) test strip   No No   Sig: TEST BLOOD GLUCOSE TWO TIMES A DAY   lisinopril (ZESTRIL) 5 mg tablet   No No   Sig: Take 1 tablet (5 mg total) by mouth daily   metFORMIN (GLUCOPHAGE-XR) 500 mg 24 hr tablet   No No   Sig: Take 2 tablets (1,000 mg total) by mouth 2 (two) times a day   pantoprazole (PROTONIX) 40 mg tablet   Yes No   Si mg   potassium chloride (K-DUR,KLOR-CON) 10 mEq tablet   No No   Sig: Take 1 tablet (10 mEq total) by mouth 2 (two) times a day   pregabalin (LYRICA) 200 MG capsule   No No   Sig: TAKE ONE CAPSULE BY MOUTH THREE TIMES A DAY   solifenacin (VESICARE) 5 mg tablet   No No   Sig: Take 1 tablet (5 mg total) by mouth daily      Facility-Administered Medications: None       Allergies   Allergen Reactions    Paroxetine Other (See Comments)     Became suicidal    Adhesive [Medical Tape] Itching and Blisters    Carafate [Sucralfate] Other (See Comments)     Mouth sores, tongue sore    Sulfa Antibiotics Hives and Rash    Sulfamethoxazole-Trimethoprim Hives       Objective        Vitals:    24 0300 24 0700 24 1009 24 1513   BP: 142/65 143/62  115/56   BP Location:    Left arm   Pulse: 78 84  86   Resp: 12 18  18   Temp:  98.6 °F (37 °C)  99.9 °F (37.7 °C)   TempSrc:  Oral  Oral   SpO2: 92% 93% 94% 94%   Weight:       Height:             Physical Exam  Vitals and nursing note reviewed.   Constitutional:       General: She is awake. She is not in  acute distress.     Appearance: She is not ill-appearing, toxic-appearing or diaphoretic.   Skin:     General: Skin is warm and dry.   Neurological:      Mental Status: She is alert and oriented to person, place, and time.      GCS: GCS eye subscore is 4. GCS verbal subscore is 5. GCS motor subscore is 6.   Psychiatric:         Attention and Perception: Attention normal.         Speech: Speech normal.         Behavior: Behavior normal. Behavior is cooperative.           Lab Results:   Estimated Creatinine Clearance: 70.7 mL/min (A) (by C-G formula based on SCr of 0.58 mg/dL (L)).  Lab Results   Component Value Date    WBC 9.77 02/01/2024    WBC 9.0 05/21/2018    HGB 12.0 02/01/2024    HGB 16.8 (H) 05/21/2018    HCT 38.2 02/01/2024    HCT 50.1 (H) 05/21/2018     02/01/2024     05/21/2018         Component Value Date/Time     05/21/2018 1032    K 4.4 02/01/2024 0509    K 4.0 05/21/2018 1032     02/01/2024 0509    CL 95 (L) 05/21/2018 1032    CO2 26 02/01/2024 0509    CO2 27 01/31/2024 1558    BUN 16 02/01/2024 0509    BUN 15 05/21/2018 1032    CREATININE 0.58 (L) 02/01/2024 0509    CREATININE 0.62 05/21/2018 1032         Component Value Date/Time    CALCIUM 9.2 02/01/2024 0509    CALCIUM 10.0 05/21/2018 1032    ALKPHOS 64 01/23/2024 1940    ALKPHOS 49 (L) 05/21/2018 1032    AST 10 (L) 01/23/2024 1940    AST 17 05/21/2018 1032    ALT 7 01/23/2024 1940    ALT 11 05/21/2018 1032    BILITOT 0.4 05/21/2018 1032    TP 7.3 01/23/2024 1940    ALB 4.2 01/23/2024 1940    ALB 4.4 05/21/2018 1032    ALB 4.3 05/21/2018 1032       Imaging Studies/EKG: I have personally reviewed pertinent reports.       Counseling / Coordination of Care  Total floor / unit time spent today 30 minutes minutes. Greater than 50% of total time was spent with the patient and / or family counseling and / or coordination of care. A description of the counseling / coordination of care: Patient interview, physical examination,  review of medical records, review of imaging and laboratory data, development of pain management plan, discussion of pain management plan with patient and primary service.    Please note that the APS provides consultative services regarding pain management only.  With the exception of ketamine and epidural infusions and except when indicated, final decisions regarding starting or changing doses of analgesic medications are at the discretion of the consulting service.    Ricardo Elmore PA-C   Acute Pain Service

## 2024-02-01 NOTE — CONSULTS
Consultation - Plastic Surgery   Lona Cedeno 78 y.o. female MRN: [id number]  Unit/Bed#: [facility] 503-01 Encounter: [id number]      Assessment/Plan      Assessment:  Pt is a 78 yr old female with PAD s/p right Ax-Fem bypass and femoral endarterectomy on 1/31 who is also found to have right foot wounds located over the achilles tendon and medial malleolus. Plastic surgery was consulted to evaluate for wound coverage   Plan:  -Had a long discussion with patient regarding plastic surgery options and our role in her care. I explained that would coverage can be achieved with skin grafts or local flaps but stated that Podiatry's plan for achilles reconstruction needs to be discussed because if they are unable to help her surgically, she were to lose function of her ankle/foot and although we can still cover a wound with soft tissue, is this something she truly desires. The options for local flaps are quite limited given the location of the wound, a sural artery flap or medial plantar artery flap would not be options. A free flap would not be a consideration in this patient.   - I also discussed with ehr that smoking will increase the chances of graft and flap loss so cessation should be initiated sooner rather than later if she desires any salvage of her foot.   -Will f/u Podiatry plan and would recommend if podiatry does plan intervention to place wound vac overtop and have her follow up in clinic. If granulating then we would consider integra vs skin graft as this would be her best option.   - Plan discussed with Dr. Rubin  - Thank you for the consult and dont hesitate to reach out with her further questions     History of Present Illness   Physician Requesting Consult: Seth Mak*  Reason for Consult / Principal Problem: Right foot wounds  Hx and PE limited by:   HPI: Lona Cedeno is a 78 y.o. year old female who presents with a history of PAD s/p right Ax-Fem bypass and femoral endarterectomy on 1/31, DM,  HLD, Tobacco use (one pack/day), and anxiety who was admitted on  for foot wounds noted by Podiatry in the outpatient setting. She was sent to the Ed and further work up was performed including a vascular surgery consult ultimately prompting a bypass. She reports the wounds began about 6 months ago although can't recall exact incident. She reports no pain but slight decrease in sensation to the foot. There was a discussion regarding right BKA, but that was not performed in the procedure omn  as further evaluating and opinions are wished to be explored prior to amputation. She denies fevers or chills    Currently on ASA 81 mg and SQH for DVT ppx. Per vascular will plan Xaretlo 2.5 mg BID after surgical course    Inpatient consult to Plastic Surgery  Consult performed by: Ricardo Chase MD  Consult ordered by: Madeline Gallardo MD          Review of Systems   Constitutional:  Negative for chills and fever.   Respiratory:  Negative for shortness of breath.    Cardiovascular:  Negative for chest pain.   Gastrointestinal:  Negative for nausea and vomiting.   Genitourinary:  Negative for dysuria.   All other systems reviewed and are negative.      Historical Information   Past Medical History:   Diagnosis Date    Anxiety     Closed fracture of coccyx (HCC) 2023    Diabetes mellitus (HCC)     GERD (gastroesophageal reflux disease)     Hyperlipidemia     Hypertension     IBS (irritable bowel syndrome)     Shoulder fracture      Past Surgical History:   Procedure Laterality Date    ANKLE FRACTURE SURGERY Right     has screw in place     SECTION      x2    CHOLECYSTECTOMY      CYSTOSCOPY W/ LASER LITHOTRIPSY Left 2023    Procedure: CYSTOSCOPY; RETROGRADE; URETEROSCOPY; BIOPSY; LEFT -INSERTION OF STENT;  Surgeon: Cristian Child MD;  Location: [facility];  Service: Urology    CYSTOSCOPY W/ LASER LITHOTRIPSY Left 2023    Procedure: CYSTOSCOPY; RETROGRADE; URETEROSCOPY; 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;  Surgeon: Cristian Child MD;  Location: [facility];  Service: Urology    FL RETROGRADE PYELOGRAM  9/18/2023    HERNIA REPAIR      umbilicus w/ mesh     Social History   Social History     Substance and Sexual Activity   Alcohol Use Not Currently     Social History     Substance and Sexual Activity   Drug Use Never     E-Cigarette/Vaping    E-Cigarette Use Never User      E-Cigarette/Vaping Substances    Nicotine No     THC No     CBD No     Flavoring No     Other No     Unknown No      Social History     Tobacco Use   Smoking Status Every Day    Current packs/day: 1.00    Average packs/day: 1 pack/day for 63.1 years (63.1 ttl pk-yrs)    Types: Cigarettes    Start date: 1961   Smokeless Tobacco Never   Tobacco Comments    11/14/23 smokes about 3/4 PPD     Family History: non-contributory    Meds/Allergies   all current active meds have been reviewed    Allergies   Allergen Reactions    Paroxetine Other (See Comments)     Became suicidal    Adhesive [Medical Tape] Itching and Blisters    Carafate [Sucralfate] Other (See Comments)     Mouth sores, tongue sore    Sulfa Antibiotics Hives and Rash    Sulfamethoxazole-Trimethoprim Hives       Objective     Intake/Output Summary (Last 24 hours) at 2/1/2024 1222  Last data filed at 2/1/2024 1013  Gross per 24 hour   Intake 3501.25 ml   Output 1305 ml   Net 2196.25 ml       Invasive Devices       Peripheral Intravenous Line  Duration             Peripheral IV 01/29/24 Left;Ventral (anterior) Forearm 3 days              Drain  Duration             Ureteral Internal Stent Left ureter 6 Fr. 73 days                    Physical Exam  General: No acuter distress  HEENT: atraumatic, nuck supple  CV: Regular rate  Pulm: non labored breathing  Abd: soft and non tender  MSK: R DP/PT signal dopplerable. Achilles wound ~5 cm and wound over medial malleolus.  Neuro: AAOx3  Pysch: mood consistent with affect      Lab Results:   Lab Results   Component Value  Date    WBC 9.77 02/01/2024    HGB 12.0 02/01/2024    HCT 38.2 02/01/2024    MCV 97 02/01/2024     02/01/2024      Lab Results   Component Value Date/Time    WOUNDCULT 1+ Growth of 01/23/2024 07:45 PM     Imaging Studies: None  EKG, Pathology, and Other Studies:   Lab Results   Component Value Date/Time    FINALDX  09/18/2023 04:17 PM     A. 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    Comment: The differential diagnosis includes papillary urothelial neoplasm of low malignant potential (PUNLMP) and low grade papillary urothelial carcinoma. High grade features are not present. Deeper levels examined.       FINALDX  09/18/2023 03:28 PM     A. Urine, Other:  Negative for high grade urothelial carcinoma (NHGUC) - see comment.  Urothelial cells, some in groups with papillary-like configuration.     Satisfactory for evaluation.    B. Urine, Other, Left Kidney:  Negative for high grade urothelial carcinoma (NHGUC) - see comment.  Urothelial cells, some in groups with papillary-like configuration.     Satisfactory for evaluation.    C. Urine, Other, Left Kidney Washings:  Negative for high grade urothelial carcinoma (NHGUC) - see comment.  Urothelial cells, some in groups with papillary-like configuration.     Satisfactory for evaluation.    Comment:  The above diagnostic category is from the recently published book, The Jessica System for Reporting Urinary Cytology, and is in keeping with the ongoing effort for utilization of standardized diagnostic terminology in urine cytology.*    *The Jessica System for Reporting Urinary Cytology. Ning Aldrich, Peace Henson, Leonard Chang; 2016.     See concurrent surgical pathology report, [id number].       VTE Prophylaxis: Heparin    Code Status: Level 1 - Full Code  Advance Directive and Living Will:      Power of :    POLST: Yes    Counseling / Coordination of Care  Total floor /  unit time spent today 40 minutes. Greater than 50% of total time was spent with the patient and / or family counseling and / or coordination of care.

## 2024-02-01 NOTE — QUICK NOTE
"Post-Op Check - Vascular Surgery   Lona Cedeno 78 y.o. female MRN: [id number]  Unit/Bed#: [facility] 503-01 Encounter: [id number]    Assessment  78yoF s/p R Ax-fem bypass with PTFE, R femoral endarterectomy and profundaplasty with patch angio using PTFE bypass graft ruelas     Plan:  - Incentive spirometry  - Diet as tolerated  - PRN Analgesia  - Goal SBP normotensive; -160  - I/Os  - Lovenox: VTE Prophylaxis; plan to restart hep gtt in AM   - continue frequent Neurovascular checks  - Maintain prevena vac in place  - ASA/Statin    Subjective: Patient endorses mild R groin surgical site incisional pain, denies new paresthesias, weakness.     Vitals:  /52   Pulse 60   Temp 98.2 °F (36.8 °C)   Resp 18   Ht 5' 1\" (1.549 m)   Wt 66.1 kg (145 lb 11.6 oz)   SpO2 97%   BMI 27.53 kg/m²     Physical Exam:  General appearance: alert and oriented, in no acute distress  Neurologic: Grossly neurologically intact  Neck: supple, symmetrical, trachea midline  Lungs:  Normal work of breathing, no accessory muscle use  Heart:  Regular rate and rhythm  Abdomen:  soft, non-tender abdomen. R groin with some tenderness to palpation,  soft, without noted hematoma, Prevena with good seal  Extremities:  Bilateral LE M/S intact. R foot with clean and dry dressings in place, wrapped.     Pulse exam:  DP: Right: doppler signal Left: doppler signal  PT: Right: doppler signal Left: doppler signal    I/Os:  I/O last 3 completed shifts:  In: 1940 [P.O.:240; I.V.:1700]  Out: 3105 [Urine:3055; Blood:50]  No intake/output data recorded.    Invasive Lines/Tubes:  Invasive Devices       Peripheral Intravenous Line  Duration             Peripheral IV 01/29/24 Left;Ventral (anterior) Forearm 2 days              Arterial Line  Duration             Arterial Line 01/31/24 Left Radial <1 day              Drain  Duration             Ureteral Internal Stent Left ureter 6 Fr. 72 days    Urethral Catheter Latex <1 day                  VTE " Prophylaxis: Enoxaparin (Lovenox)    Sandra Arenas PA-C  1/31/2024

## 2024-02-01 NOTE — TREATMENT PLAN
Podiatry Treatment Plan    - Discussion with patient at bedside regarding salvageability of right lower extremity s/p revascularization. Podiatry continues to recommend unlikely salvageability of RLE  - Discussed with patient that given the extensive exposure of the right Achilles tendon, significant surgical debridement would need to take place, potentially multiple procedures to ensure resection of entirety of necrotic tissue.  - Extensive debridement would likely leave the patient with a nonfunctional Achilles tendon, limiting function of her right foot. It would also entail an extended prior of nonweightbearing and healing.  - At this time, patient would like time to consider her options as they pertain to quality of life.  She is aware that even if successful debridement is possible, it would be a combined effort with plastics to attempt coverage of the wound. There are no guarantees that attempt at limb salvage would be successful.  - Plan to return tomorrow 2/2/24 for a finalized discussion regarding limb salvage vs. proximal amputation

## 2024-02-01 NOTE — ASSESSMENT & PLAN NOTE
ASA and Lipitor  LDL 80  Vascular consulted for potential bypass surgery with wounds as above   [place] [facility] [place]

## 2024-02-01 NOTE — CASE MANAGEMENT
Case Management Discharge Planning Note    Patient name Lona Cedeno  Location [facility] 503/[facility] 503-01 MRN [id number]  : 1945 Date 2024       Current Admission Date: 2024  Current Admission Diagnosis:Diabetic foot ulcer with osteomyelitis (HCC)   Patient Active Problem List    Diagnosis Date Noted    Constipation 2024    Preoperative cardiovascular examination 2024    Diabetic foot ulcer with osteomyelitis (HCC) 2024    Ankle ulcer, right, with fat layer exposed (HCC) 2023    Age-related osteoporosis without current pathological fracture 2023    Abnormal CT of the chest 10/17/2023    COPD, severity to be determined ([facility]) 10/17/2023    Tobacco use disorder 10/17/2023    PAD (peripheral artery disease) ([facility]) 10/10/2023    Bladder neoplasm 2023    Left renal mass 2023    Lactic acidosis 2023    Hypomagnesemia 2023    Degenerative lumbar disc 2023    Urinary incontinence 2023    GERD without esophagitis 10/16/2019    Medicare annual wellness visit, subsequent 2019    Essential hypertension 02/15/2019    Anxiety and depression 02/15/2019    Type 2 diabetes mellitus with diabetic polyneuropathy (HCC) 2019      LOS (days): 6  Geometric Mean LOS (GMLOS) (days): 3.1  Days to GMLOS:-2.6     OBJECTIVE:  Risk of Unplanned Readmission Score: 16.11         Current admission status: Inpatient   Preferred Pharmacy:   [id number] [address] [address]  Phone: [phone] Fax: [phone]    [facility] [place] [facility] [address]  [address] [facility]  [place] [address]  Phone: [phone] Fax: [phone]    Primary Care Provider: Vivek Preston DO    Primary Insurance: [employer] REP  Secondary Insurance:     DISCHARGE DETAILS:    Contacts  Reason/Outcome: Referral, Discharge Planning    Other Referral/Resources/Interventions Provided:  Referral  Comments: CM met w/ Pt to discuss rehab rec's/ Pt agreeable to blanket STR referrals, submitted in Aidin

## 2024-02-01 NOTE — DISCHARGE INSTR - OTHER ORDERS
Vascular Incisional care:   Wash incisions daily with soap and water.  Pat dry thoroughly.  Place clean, dry gauze to cover groin to keep area clean and dry and to prevent skin-skin contact        Discharge Instructions - Podiatry    Weight Bearing Status: Non-weight bearing to right lower extremity                   Pain: Continue analgesics as needed    Follow-up appointment instructions: Please make an appointment within one week of discharge with Dr. Gann/ [facility]. Contact sooner if any increase in pain, or signs of infection occur    Wound Care: Leave dressings clean, dry, and intact between professional dressing changes    Nursing Instructions:  Wound Vac: Please apply a black sponge fit to the size of the right ankle wound to cover the Achilles tendon.  Please cover with the adhesive and the track pad.  Set the wound VAC settings to 125 mmHg continuous. This can be secured with gauze, kerlex, and an ACE wrap. Please change every Monday, Wednesday, and Friday.     Right 2nd toe incision: Please apply Betadine paint to right second toe incision, cover with Adaptic.  Then cover with gauze and secure with Alicia.  Please change dressing 3 times a week

## 2024-02-02 ENCOUNTER — APPOINTMENT (INPATIENT)
Dept: RADIOLOGY | Facility: HOSPITAL | Age: 79
DRG: 253 | End: 2024-02-02
Attending: INTERNAL MEDICINE
Payer: COMMERCIAL

## 2024-02-02 LAB
ANION GAP SERPL CALCULATED.3IONS-SCNC: 3 MMOL/L
BUN SERPL-MCNC: 13 MG/DL (ref 5–25)
CALCIUM SERPL-MCNC: 9 MG/DL (ref 8.4–10.2)
CHLORIDE SERPL-SCNC: 102 MMOL/L (ref 96–108)
CO2 SERPL-SCNC: 30 MMOL/L (ref 21–32)
CREAT SERPL-MCNC: 0.47 MG/DL (ref 0.6–1.3)
ERYTHROCYTE [DISTWIDTH] IN BLOOD BY AUTOMATED COUNT: 13.4 % (ref 11.6–15.1)
GFR SERPL CREATININE-BSD FRML MDRD: 95 ML/MIN/1.73SQ M
GLUCOSE SERPL-MCNC: 121 MG/DL (ref 65–140)
GLUCOSE SERPL-MCNC: 141 MG/DL (ref 65–140)
GLUCOSE SERPL-MCNC: 157 MG/DL (ref 65–140)
GLUCOSE SERPL-MCNC: 192 MG/DL (ref 65–140)
GLUCOSE SERPL-MCNC: 215 MG/DL (ref 65–140)
HCT VFR BLD AUTO: 37.1 % (ref 34.8–46.1)
HGB BLD-MCNC: 11.7 G/DL (ref 11.5–15.4)
MCH RBC QN AUTO: 30.7 PG (ref 26.8–34.3)
MCHC RBC AUTO-ENTMCNC: 31.5 G/DL (ref 31.4–37.4)
MCV RBC AUTO: 97 FL (ref 82–98)
PLATELET # BLD AUTO: 238 THOUSANDS/UL (ref 149–390)
PMV BLD AUTO: 9.9 FL (ref 8.9–12.7)
POTASSIUM SERPL-SCNC: 4.8 MMOL/L (ref 3.5–5.3)
RBC # BLD AUTO: 3.81 MILLION/UL (ref 3.81–5.12)
SODIUM SERPL-SCNC: 135 MMOL/L (ref 135–147)
WBC # BLD AUTO: 10.18 THOUSAND/UL (ref 4.31–10.16)

## 2024-02-02 PROCEDURE — 85027 COMPLETE CBC AUTOMATED: CPT | Performed by: PHYSICIAN ASSISTANT

## 2024-02-02 PROCEDURE — 99233 SBSQ HOSP IP/OBS HIGH 50: CPT | Performed by: PODIATRIST

## 2024-02-02 PROCEDURE — 80048 BASIC METABOLIC PNL TOTAL CA: CPT | Performed by: PHYSICIAN ASSISTANT

## 2024-02-02 PROCEDURE — 97530 THERAPEUTIC ACTIVITIES: CPT

## 2024-02-02 PROCEDURE — 97535 SELF CARE MNGMENT TRAINING: CPT

## 2024-02-02 PROCEDURE — 99232 SBSQ HOSP IP/OBS MODERATE 35: CPT | Performed by: INTERNAL MEDICINE

## 2024-02-02 PROCEDURE — 82948 REAGENT STRIP/BLOOD GLUCOSE: CPT

## 2024-02-02 PROCEDURE — 97542 WHEELCHAIR MNGMENT TRAINING: CPT

## 2024-02-02 PROCEDURE — 71045 X-RAY EXAM CHEST 1 VIEW: CPT

## 2024-02-02 PROCEDURE — 99024 POSTOP FOLLOW-UP VISIT: CPT | Performed by: SURGERY

## 2024-02-02 RX ORDER — CEFAZOLIN SODIUM 1 G/50ML
1000 SOLUTION INTRAVENOUS ONCE
Status: DISCONTINUED | OUTPATIENT
Start: 2024-02-03 | End: 2024-02-02

## 2024-02-02 RX ORDER — CEFAZOLIN SODIUM 1 G/50ML
1000 SOLUTION INTRAVENOUS
Status: ACTIVE | OUTPATIENT
Start: 2024-02-03 | End: 2024-02-04

## 2024-02-02 RX ADMIN — NICOTINE 1 PATCH: 21 PATCH, EXTENDED RELEASE TRANSDERMAL at 08:09

## 2024-02-02 RX ADMIN — ENOXAPARIN SODIUM 70 MG: 80 INJECTION SUBCUTANEOUS at 21:08

## 2024-02-02 RX ADMIN — ACETAMINOPHEN 975 MG: 325 TABLET, FILM COATED ORAL at 06:15

## 2024-02-02 RX ADMIN — DOCUSATE SODIUM 100 MG: 100 CAPSULE, LIQUID FILLED ORAL at 08:03

## 2024-02-02 RX ADMIN — METHOCARBAMOL 250 MG: 500 TABLET ORAL at 14:56

## 2024-02-02 RX ADMIN — ATORVASTATIN CALCIUM 10 MG: 10 TABLET, FILM COATED ORAL at 17:00

## 2024-02-02 RX ADMIN — INSULIN LISPRO 1 UNITS: 100 INJECTION, SOLUTION INTRAVENOUS; SUBCUTANEOUS at 08:02

## 2024-02-02 RX ADMIN — METHOCARBAMOL 250 MG: 500 TABLET ORAL at 21:09

## 2024-02-02 RX ADMIN — PREGABALIN 200 MG: 100 CAPSULE ORAL at 08:02

## 2024-02-02 RX ADMIN — OXYBUTYNIN CHLORIDE 5 MG: 5 TABLET, EXTENDED RELEASE ORAL at 08:02

## 2024-02-02 RX ADMIN — SENNOSIDES 8.6 MG: 8.6 TABLET, FILM COATED ORAL at 21:12

## 2024-02-02 RX ADMIN — AMILORIDE HYDROCLORIDE 5 MG: 5 TABLET ORAL at 08:07

## 2024-02-02 RX ADMIN — PREGABALIN 200 MG: 100 CAPSULE ORAL at 21:09

## 2024-02-02 RX ADMIN — INSULIN LISPRO 5 UNITS: 100 INJECTION, SOLUTION INTRAVENOUS; SUBCUTANEOUS at 08:06

## 2024-02-02 RX ADMIN — ACETAMINOPHEN 975 MG: 325 TABLET, FILM COATED ORAL at 14:56

## 2024-02-02 RX ADMIN — CLONAZEPAM 1 MG: 1 TABLET ORAL at 17:00

## 2024-02-02 RX ADMIN — METHOCARBAMOL 250 MG: 500 TABLET ORAL at 06:15

## 2024-02-02 RX ADMIN — PANTOPRAZOLE SODIUM 40 MG: 40 TABLET, DELAYED RELEASE ORAL at 08:03

## 2024-02-02 RX ADMIN — INSULIN LISPRO 5 UNITS: 100 INJECTION, SOLUTION INTRAVENOUS; SUBCUTANEOUS at 17:01

## 2024-02-02 RX ADMIN — FLUTICASONE FUROATE AND VILANTEROL TRIFENATATE 1 PUFF: 200; 25 POWDER RESPIRATORY (INHALATION) at 08:02

## 2024-02-02 RX ADMIN — BUPROPION HYDROCHLORIDE 300 MG: 150 TABLET, FILM COATED, EXTENDED RELEASE ORAL at 08:03

## 2024-02-02 RX ADMIN — PREGABALIN 200 MG: 100 CAPSULE ORAL at 17:00

## 2024-02-02 RX ADMIN — DOCUSATE SODIUM 100 MG: 100 CAPSULE, LIQUID FILLED ORAL at 17:00

## 2024-02-02 RX ADMIN — ENOXAPARIN SODIUM 70 MG: 80 INJECTION SUBCUTANEOUS at 08:04

## 2024-02-02 RX ADMIN — INSULIN LISPRO 2 UNITS: 100 INJECTION, SOLUTION INTRAVENOUS; SUBCUTANEOUS at 11:38

## 2024-02-02 RX ADMIN — ASPIRIN 81 MG: 81 TABLET, COATED ORAL at 08:02

## 2024-02-02 RX ADMIN — OMEGA-3 FATTY ACIDS CAP 1000 MG 1000 MG: 1000 CAP at 08:03

## 2024-02-02 RX ADMIN — INSULIN LISPRO 5 UNITS: 100 INJECTION, SOLUTION INTRAVENOUS; SUBCUTANEOUS at 11:39

## 2024-02-02 NOTE — PLAN OF CARE
Problem: PHYSICAL THERAPY ADULT  Goal: Performs mobility at highest level of function for planned discharge setting.  See evaluation for individualized goals.  Description: Treatment/Interventions: Functional transfer training, Therapeutic exercise, Endurance training, Gait training, Bed mobility, Equipment eval/education, Elevations  Equipment Recommended: Walker       See flowsheet documentation for full assessment, interventions and recommendations.  Outcome: Progressing  Note: Prognosis: Fair  Problem List: Decreased strength, Decreased endurance, Impaired balance, Decreased mobility, Decreased cognition, Impaired judgement, Pain, Decreased skin integrity  Assessment: PT initiated treatment session in order to assist patient in achieving goals to improve transfers and overall activity tolerance and to initiate WC mobility. Despite education patient is not compliant with maintaining R LE NWB. She required mod-AX1 for stand pivot transfer (chair<-->WC). She self propelled manual WC x 60 feet with CG assist, poor tolerance requiring rest breaks every 10 feet. Throughout treatment session patient required both verbal and tactile cuing to improve safety, efficiency, and mechanics of mobility in addition to hands on assistance for all aspects of functional mobility. Additionally, he required increased time to execute specific mobility tasks with rest breaks in between secondary to gross fatigue and weakness. PT d/c recommendation for rehab remains appropriate. Patient will continue to benefit from continued skilled PT this admission to achieve maximal function and safety.  Barriers to Discharge: Inaccessible home environment, Decreased caregiver support     Rehab Resource Intensity Level, PT: II (Moderate Resource Intensity)    See flowsheet documentation for full assessment.

## 2024-02-02 NOTE — PROGRESS NOTES
Progress Note - Vascular Surgery   Loan Cedeno 78 y.o. female [id number]  Unit/Bed#:[facility] 503-01 Encounter: [id number]      Assessment:    78 y.o. with PMH HTN, HLD, COPD, DM, Right Ankle ORIF presenting with AIOD, Right CLTI c exposed, necrotic heel wound.     Vascular surgery consulted for AIOD, Right CLTI c exposed necrotic heel wound.     1/31: Right Ax to Fem PTFE bypass    Plan:  - Doing well post-op; viable, well perfused limb with palpable pulses  - Ongoing discussions with podiatry, plastic surgery regarding limb salvage   - Poss staged BKA  - OOB as tolerated  - Diet as tolerated  - Abx None  - VTEppx: SQH  - Cont Aspirin, Statin  - Will plan for Xarelto 2.5mg BID after surgical course   - Further recommendations pending clinical course     Subjective:    Pt s/e. No acute events overnight. Pt awake, alert.    Tolerating diet. OOB. Voiding.      Pain controlled. Admits to pain at surgical site.     No new complaints. Denies n/v, sob, chest pain, f/c. No sensory change, numbness, or weakness of lower extremities    I/Os:  I/O last 3 completed shifts:  In: 3131.3 [P.O.:1930; I.V.:1101.3; IV Piggyback:100]  Out: 1550 [Urine:1550]  I/O this shift:  In: 180 [P.O.:180]  Out: -     Review of Systems   All other systems reviewed and are negative.      Vitals:    02/02/24 0731   BP: 144/61   Pulse: 89   Resp: 20   Temp: 99.4 °F (37.4 °C)   SpO2: (!) 84%        Physical Exam  Vitals and nursing note reviewed.   Constitutional:       General: She is not in acute distress.     Appearance: She is well-developed. She is not diaphoretic.   HENT:      Head: Normocephalic and atraumatic.   Eyes:      Conjunctiva/sclera: Conjunctivae normal.   Cardiovascular:      Rate and Rhythm: Normal rate and regular rhythm.      Heart sounds: No murmur heard.     Comments: RUE Palp brachial, radial pulse     LLE non-palp fem  RLE palp  Pop, dop DP/PT  Pulmonary:      Effort: Pulmonary effort is normal. No respiratory distress.       Breath sounds: Normal breath sounds.   Abdominal:      Palpations: Abdomen is soft.      Tenderness: There is no abdominal tenderness.   Musculoskeletal:         General: Signs of injury present. No swelling.      Cervical back: Neck supple.      Comments: Right heel exposed achilles tendon, no erythema, no drainage     Right chest wall incision soft, c/d/I  Right groin soft, no hematoma, no ecchymosis    Skin:     General: Skin is warm and dry.      Capillary Refill: Capillary refill takes less than 2 seconds.   Neurological:      General: No focal deficit present.      Mental Status: She is alert and oriented to person, place, and time. Mental status is at baseline.   Psychiatric:         Mood and Affect: Mood normal.         Imaging:I have personally reviewed pertinent reports.      Lab Results and Cultures:   Lab Results   Component Value Date    WBC 10.18 (H) 02/02/2024    HGB 11.7 02/02/2024    HCT 37.1 02/02/2024    MCV 97 02/02/2024     02/02/2024     Lab Results   Component Value Date    GLUCOSE 143 (H) 01/31/2024    CALCIUM 9.0 02/02/2024     05/21/2018    K 4.8 02/02/2024    CO2 30 02/02/2024     02/02/2024    BUN 13 02/02/2024    CREATININE 0.47 (L) 02/02/2024     Lab Results   Component Value Date    INR 1.03 01/23/2024    INR 1.08 08/11/2023    PROTIME 13.6 01/23/2024    PROTIME 14.1 08/11/2023        Blood Culture:   Lab Results   Component Value Date    BLOODCX No Growth After 5 Days. 01/23/2024   ,   Urinalysis:   Lab Results   Component Value Date    COLORU Yellow 11/20/2023    COLORU YELLOW 05/21/2018    CLARITYU Clear 11/20/2023    CLARITYU CLEAR 05/21/2018    SPECGRAV 1.010 11/20/2023    SPECGRAV 1.015 05/21/2018    PHUR 5.0 11/20/2023    PHUR 6.0 05/21/2018    LEUKOCYTESUR Negative 11/20/2023    LEUKOCYTESUR NEGATIVE 05/21/2018    NITRITE Negative 11/20/2023    NITRITE NEGATIVE 05/21/2018    PROTEINUA NEGATIVE 05/21/2018    GLUCOSEU Negative 11/20/2023    GLUCOSEU NEGATIVE  05/21/2018    KETONESU Negative 11/20/2023    KETONESU NEGATIVE 05/21/2018    BILIRUBINUR Negative 11/20/2023    BILIRUBINUR NEGATIVE 05/21/2018    BLOODU Negative 11/20/2023    BLOODU NEGATIVE 05/21/2018   ,   Urine Culture:   Lab Results   Component Value Date    URINECX No Growth <1000 cfu/mL 11/20/2023   ,   Wound Culure:   Lab Results   Component Value Date    WOUNDCULT 1+ Growth of 01/23/2024       Medications:  Current Facility-Administered Medications   Medication Dose Route Frequency    acetaminophen (TYLENOL) tablet 975 mg  975 mg Oral Q8H JILLIAN    albuterol (PROVENTIL HFA,VENTOLIN HFA) inhaler 2 puff  2 puff Inhalation Q6H PRN    AMILoride tablet 5 mg  5 mg Oral Daily    aspirin (ECOTRIN LOW STRENGTH) EC tablet 81 mg  81 mg Oral Daily    atorvastatin (LIPITOR) tablet 10 mg  10 mg Oral Daily With Dinner    buPROPion (WELLBUTRIN XL) 24 hr tablet 300 mg  300 mg Oral Daily    clonazePAM (KlonoPIN) tablet 1 mg  1 mg Oral QPM    docusate sodium (COLACE) capsule 100 mg  100 mg Oral BID    enoxaparin (LOVENOX) subcutaneous injection 70 mg  1 mg/kg Subcutaneous Q12H [facility]    fish oil capsule 1,000 mg  1,000 mg Oral Daily    fluticasone-vilanterol 200-25 mcg/actuation 1 puff  1 puff Inhalation Daily    HYDROmorphone HCl (DILAUDID) injection 0.2 mg  0.2 mg Intravenous Q4H PRN    insulin lispro (HumaLOG) 100 units/mL subcutaneous injection 1-5 Units  1-5 Units Subcutaneous TID AC    insulin lispro (HumaLOG) 100 units/mL subcutaneous injection 1-5 Units  1-5 Units Subcutaneous HS    insulin lispro (HumaLOG) 100 units/mL subcutaneous injection 5 Units  5 Units Subcutaneous TID With Meals    methocarbamol (ROBAXIN) tablet 250 mg  250 mg Oral Q8H JILLIAN    nicotine (NICODERM CQ) 21 mg/24 hr TD 24 hr patch 1 patch  1 patch Transdermal Daily    ondansetron (ZOFRAN) injection 4 mg  4 mg Intravenous Q6H PRN    oxybutynin (DITROPAN-XL) 24 hr tablet 5 mg  5 mg Oral Daily    oxyCODONE (ROXICODONE) split tablet 2.5 mg  2.5 mg Oral Q4H  PRN    Or    oxyCODONE (ROXICODONE) IR tablet 5 mg  5 mg Oral Q4H PRN    pantoprazole (PROTONIX) EC tablet 40 mg  40 mg Oral Early Morning    polyethylene glycol (MIRALAX) packet 17 g  17 g Oral Daily PRN    pregabalin (LYRICA) capsule 200 mg  200 mg Oral TID    senna (SENOKOT) tablet 8.6 mg  1 tablet Oral HS       Patient Active Problem List   Diagnosis    Type 2 diabetes mellitus with diabetic polyneuropathy (HCC)    Essential hypertension    Anxiety and depression    Medicare annual wellness visit, subsequent    GERD without esophagitis    Urinary incontinence    Degenerative lumbar disc    Lactic acidosis    Hypomagnesemia    Bladder neoplasm    Left renal mass    PAD (peripheral artery disease) (HCC)    Abnormal CT of the chest    COPD, severity to be determined (HCC)    Tobacco use disorder    Age-related osteoporosis without current pathological fracture    Ankle ulcer, right, with fat layer exposed (HCC)    Diabetic foot ulcer with osteomyelitis (HCC)    Preoperative cardiovascular examination    Constipation       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;  Surgeon: Cristian Child MD;  Location: [facility];  Service: Urology    FL RETROGRADE PYELOGRAM  2023    HERNIA REPAIR      umbilicus w/ mesh    RI BYPASS W/VEIN AXILLARY-FEMORAL Right 2024    Procedure: BYPASS AXILLO-FEMORAL, RIGHT WITH 8MM RINGED PTFE GRAFT;  Surgeon: Seth Hoyos MD;  Location: [facility];  Service: Vascular       Family History   Problem  Relation Age of Onset    COPD Mother     Emphysema Mother     COPD Father     Emphysema Father        Social History     Socioeconomic History    Marital status: /Civil Union     Spouse name: Not on file    Number of children: Not on file    Years of education: Not on file    Highest education level: Not on file   Occupational History    Not on file   Tobacco Use    Smoking status: Every Day     Current packs/day: 1.00     Average packs/day: 1 pack/day for 63.1 years (63.1 ttl pk-yrs)     Types: Cigarettes     Start date: 1961    Smokeless tobacco: Never    Tobacco comments:     11/14/23 smokes about 3/4 PPD   Vaping Use    Vaping status: Never Used   Substance and Sexual Activity    Alcohol use: Not Currently    Drug use: Never    Sexual activity: Not on file   Other Topics Concern    Not on file   Social History Narrative    Not on file     Social Determinants of Health     Financial Resource Strain: Low Risk  (2/9/2023)    Overall Financial Resource Strain (CARDIA)     Difficulty of Paying Living Expenses: Not hard at all   Food Insecurity: No Food Insecurity (1/24/2024)    Hunger Vital Sign     Worried About Running Out of Food in the Last Year: Never true     Ran Out of Food in the Last Year: Never true   Transportation Needs: No Transportation Needs (1/24/2024)    PRAPARE - Transportation     Lack of Transportation (Medical): No     Lack of Transportation (Non-Medical): No   Physical Activity: Not on file   Stress: Not on file   Social Connections: Not on file   Intimate Partner Violence: Not on file   Housing Stability: Low Risk  (1/24/2024)    Housing Stability Vital Sign     Unable to Pay for Housing in the Last Year: No     Number of Places Lived in the Last Year: 1     Unstable Housing in the Last Year: No       Allergies   Allergen Reactions    Paroxetine Other (See Comments)     Became suicidal    Adhesive [Medical Tape] Itching and Blisters    Carafate [Sucralfate] Other (See Comments)     Mouth  sores, tongue sore    Sulfa Antibiotics Hives and Rash    Sulfamethoxazole-Trimethoprim Hives

## 2024-02-02 NOTE — OCCUPATIONAL THERAPY NOTE
Occupational Therapy Progress Note     Patient Name: Lona Cedeno  Today's Date: 2/2/2024  Problem List  Principal Problem:    Diabetic foot ulcer with osteomyelitis (HCC)  Active Problems:    Type 2 diabetes mellitus with diabetic polyneuropathy (HCC)    Anxiety and depression    PAD (peripheral artery disease) (HCC)    COPD, severity to be determined (HCC)    Tobacco use disorder    Constipation        02/02/24 0848   OT Last Visit   OT Visit Date 02/02/24   Note Type   Note Type Treatment   Pain Assessment   Pain Assessment Tool 0-10   Pain Score 8   Pain Location/Orientation Orientation: Right;Location: Leg   Hospital Pain Intervention(s) Ambulation/increased activity;Repositioned   Restrictions/Precautions   Weight Bearing Precautions Per Order Yes   RLE Weight Bearing Per Order NWB   Other Precautions O2;Fall Risk;Bed Alarm;Chair Alarm  (2 L O2)   ADL   Where Assessed Edge of bed   Grooming Assistance 4  Minimal Assistance   Grooming Deficit Supervision/safety;Setup;Wash/dry face;Brushing hair;Teeth care   Grooming Comments Pt required MIN A for hair brushing. Supervision for safety and setup while completing seated for all other seated grooming tasks.   UB Bathing Assistance 4  Minimal Assistance   UB Bathing Deficit Supervision/safety;Setup;Right arm;Chest;Left arm;Abdomen   UB Bathing Comments Pt requires MIN A for safety for washing back during UB bathing   LB Bathing Assistance 3  Moderate Assistance   LB Bathing Deficit Right upper leg;Left upper leg;Right lower leg including foot;Left lower leg including foot   LB Bathing Comments Pt required MOD A for lower LE bathing seated EOB   UB Dressing Assistance 4  Minimal Assistance   UB Dressing Deficit Thread RUE;Thread LUE   UB Dressing Comments Pt required MIN A to thread UEs through gown sleeves while seated EOB   LB Dressing Assistance 1  Total Assistance   LB Dressing Deficit Don/doff L sock   LB Dressing Comments Pt required total A to don L sock  while seated EOB.   Toileting Assistance  4  Minimal Assistance   Toileting Deficit Steadying   Toileting Comments Pt required MIN A for steadying assistance during hygiene   Bed Mobility   Supine to Sit 5  Supervision   Additional items HOB elevated;Bedrails;Increased time required   Additional Comments Pt requires superivison for safety during supine to sit bed mobility for safety   Transfers   Sit to Stand 4  Minimal assistance   Additional items Assist x 1;Increased time required;Verbal cues   Stand to Sit 4  Minimal assistance   Additional items Assist x 1;HOB elevated;Increased time required;Verbal cues   Additional Comments Pt requires MIN A to complete functional transfers. Pt requires vc to maintain NWB R LE.   Functional Mobility   Functional Mobility 3  Moderate assistance   Additional Comments Pt requires multiple vc to maintain NWB R LE during functional mobility.   Additional items Rolling walker   Toilet Transfers   Toilet Transfer From Bed   Toilet Transfer Type To and from   Toilet Transfer to Standard toilet   Toilet Transfer Technique Ambulating   Toilet Transfers Minimal assistance   Toilet Transfers Comments Pt requires MIN A to complete toilet transfer. Pt requires vc to maintain NWB status   Cognition   Overall Cognitive Status WFL   Arousal/Participation Alert;Responsive;Cooperative   Attention Within functional limits   Orientation Level Oriented X4   Memory Within functional limits   Following Commands Follows one step commands without difficulty   Comments Pt agreeable to therapy. Pt requires extensive vc to maintain NWB status of R LE   Activity Tolerance   Activity Tolerance Patient tolerated treatment well   Medical Staff Made Aware RN aware   Assessment   Assessment Pt was seen on 2/2/2024 to address ADL retraining, functional transfer training, and activity tolerance/endurance. Pt NWB R LE. Pt demonstrating improvements and is currently requires MIN A to complete seated UB ADLs,  grooming, and toileting. Pt requires MOD A for LB bathing and total assist to don L sock. Pt requires MIN A with rw to complete functional transfers and MOD A for functional mobility.Pt requiring vc frequently to maintain NWB status of R LE. Pt is limited by decreased ADL status, decreased functional mobility, and decreased ability to complete functional transfers. Pt supine in bed at beginning of session and seated in bedside chair at end of session with alarm set and all items within reach. The patient's raw score on the AM-PAC Daily Activity Inpatient Short Form is 18. A raw score of less than 19 suggests the patient may benefit from discharge to post-acute rehabilitation services. Please refer to the recommendation of the Occupational Therapist for safe discharge planning. Recommend level I Maximum Resource intensity OT services at d/c to maximize pt function.   Plan   Treatment Interventions ADL retraining;UE strengthening/ROM;Endurance training;Patient/family training;Energy conservation;Activityengagement   Goal Expiration Date 02/12/24   OT Treatment Day 2   OT Frequency 2-3x/wk   Discharge Recommendation   Rehab Resource Intensity Level, OT I (Maximum Resource Intensity)   AM-PAC Daily Activity Inpatient   Lower Body Dressing 2   Bathing 3   Toileting 3   Upper Body Dressing 3   Grooming 3   Eating 4   Daily Activity Raw Score 18   Daily Activity Standardized Score (Calc for Raw Score >=11) 38.66   AM-PAC Applied Cognition Inpatient   Following a Speech/Presentation 4   Understanding Ordinary Conversation 4   Taking Medications 4   Remembering Where Things Are Placed or Put Away 4   Remembering List of 4-5 Errands 4   Taking Care of Complicated Tasks 4   Applied Cognition Raw Score 24   Applied Cognition Standardized Score 62.21   End of Consult   Education Provided Yes   Patient Position at End of Consult Bedside chair   Nurse Communication Nurse aware of consult     MARTIN Morley, OTR/L

## 2024-02-02 NOTE — PHYSICAL THERAPY NOTE
"   PT Treatment       02/02/24 1341   PT Last Visit   PT Visit Date 02/02/24   Note Type   Note Type Treatment   Pain Assessment   Pain Assessment Tool 0-10   Pain Score 8   Pain Location/Orientation Orientation: Right;Location: Groin   Hospital Pain Intervention(s) Ambulation/increased activity;Repositioned   Restrictions/Precautions   Weight Bearing Precautions Per Order Yes   RLE Weight Bearing Per Order (S)  NWB   Other Precautions O2;Pain;Multiple lines;Bed Alarm;Chair Alarm;Cognitive  (2 L O2)   General   Chart Reviewed Yes   Response to Previous Treatment Patient with no complaints from previous session.   Family/Caregiver Present No   Cognition   Arousal/Participation Alert   Attention Within functional limits   Orientation Level Oriented to person;Oriented to place;Oriented to situation   Memory Decreased recall of precautions   Following Commands Follows one step commands with increased time or repetition   Comments limited insight into deficits; does not maintain R LE NWB despite education, removes O2 without asking   Subjective   Subjective \"this hurts\"- referring to right groin   Bed Mobility   Supine to Sit Unable to assess   Sit to Supine Unable to assess   Additional Comments patient OOB in chair pre and post treatment with alarm active   Transfers   Sit to Stand 4  Minimal assistance   Additional items Assist x 1;Increased time required;Verbal cues   Stand to Sit 4  Minimal assistance   Additional items Assist x 1;Increased time required;Verbal cues   Stand pivot 3  Moderate assistance   Additional items Assist x 1;Increased time required;Verbal cues   Additional Comments prior to performance of standing, therapist demonstrated/educated maintaining R LE NWB however patient appears to put in little effort to attempt to maintain compliance. min-AX1 for sit<-->stand transfers wtih RW and mod-AX1 for stand pivot from chair<-->WC   Wheelchair Activities   Wheelchair Cushion None   Pressure Relief Type " Self adjusts   Level of Assistance for Pressure Relief Activities Close supervision   Propulsion Yes   Propulsion Type 1 Manual   Level 1 Level tile   Method 1 Right upper extremity;Left upper extremity   Level of Assistance 1 Contact guard   Description/ Details 1 patient denies having ever used WC before. therapist educated to place hand far back on rim of wheel for effective push. patient with difficulty pushing WC in straight manner despite VC for which wheel to propel (tendency to steer toward right side). she negotiated 2 turns with VC. total WC propulsion 60 feet with rest breaks approx every 10 feet   Balance   Static Sitting Normal   Static Standing Fair -   Ambulatory Poor +   Endurance Deficit   Endurance Deficit Yes   Endurance Deficit Description patient on 2 L O2- patient removed for mobilty and sats > 90%. fatigue with WC mobility   Activity Tolerance   Activity Tolerance Patient tolerated treatment well   Nurse Made Aware shala to see per PAULINA Arechiga   Exercises   Neuro re-ed 2 stand pivot transfers with VC   Assessment   Prognosis Fair   Problem List Decreased strength;Decreased endurance;Impaired balance;Decreased mobility;Decreased cognition;Impaired judgement;Pain;Decreased skin integrity   Assessment PT initiated treatment session in order to assist patient in achieving goals to improve transfers and overall activity tolerance and to initiate WC mobility. Despite education patient is not compliant with maintaining R LE NWB. She required mod-AX1 for stand pivot transfer (chair<-->WC). She self propelled manual WC x 60 feet with CG assist, poor tolerance requiring rest breaks every 10 feet. Throughout treatment session patient required both verbal and tactile cuing to improve safety, efficiency, and mechanics of mobility in addition to hands on assistance for all aspects of functional mobility. Additionally, she required increased time to execute specific mobility tasks with rest breaks in between  secondary to gross fatigue and weakness. PT d/c recommendation for rehab remains appropriate. Patient will continue to benefit from continued skilled PT this admission to achieve maximal function and safety.   Goals   Patient Goals patient expressing she is bored, agreeable to participation in therapy   LTG Expiration Date 02/06/24   Long Term Goal #1 in addition to PT goals established during PT evaluation patient will 1) self propel manual WC x 150 feet S level and manage brakes/leg rests S level in order to participate in household and community level mobility   PT Treatment Day 2   Plan   Treatment/Interventions Functional transfer training;LE strengthening/ROM;Therapeutic exercise;Endurance training;Patient/family training;Equipment eval/education;Bed mobility;Gait training;OT;Spoke to nursing   Progress Progressing toward goals   PT Frequency 3-5x/wk   Discharge Recommendation   Rehab Resource Intensity Level, PT II (Moderate Resource Intensity)   Equipment Recommended Wheelchair;Walker   AM-PAC Basic Mobility Inpatient   Turning in Flat Bed Without Bedrails 4   Lying on Back to Sitting on Edge of Flat Bed Without Bedrails 3   Moving Bed to Chair 2   Standing Up From Chair Using Arms 3   Walk in Room 2   Climb 3-5 Stairs With Railing 1   Basic Mobility Inpatient Raw Score 15   Basic Mobility Standardized Score 36.97   Highest Level Of Mobility   JH-HLM Goal 4: Move to chair/commode   JH-HLM Achieved 4: Move to chair/commode       The patient's AM-PAC Basic Mobility Inpatient Standardized Score is less than 42.9, suggesting this patient may benefit from discharge to post-acute rehabilitation services. Please also refer to the recommendation of the Physical Therapist for safe discharge planning.    ALEXA HERNANDEZ PT, DPT

## 2024-02-02 NOTE — ASSESSMENT & PLAN NOTE
Tx to [facility] for vascular eval/ongoing podiatry eval   Will need bypass w/ vascular before podiatry takes to OR   Podiatry and vascular d/w pt and family, patient sp bypass with vascular, she is still undecideded about proceeding to bka  Cardio provided clearance for any procedures needed  RLE NWB  ID eval at [facility] and rec to watch off abx  Continue on lovenox pending procedure

## 2024-02-02 NOTE — ASSESSMENT & PLAN NOTE
ASA and Lipitor  LDL 80  Vascular consulted for potential bypass surgery with wounds as above   Cardio at [facility] cleared for bypass

## 2024-02-02 NOTE — PROGRESS NOTES
[facility] Podiatry - Progress Note  Patient: Lona Cedeno 78 y.o. female   MRN: [id number]  PCP: Vivek Preston DO  [facility] Encounter: [id number]  Date Of Visit: 02/01/24    ASSESSMENT:    Lona Cedeno is a 78 y.o. female with:    Right posterior ankle ulcer with exposed achilles tendon  Right medial ankle ulcer, limited to breakdown of skin  Osteomyelitis of right second toe  T2DM  PAD  Tobacco use disorder  Diabetic polyneuropathy      PLAN:    Discussed with patient at bedside the salvageability of right lower extremity s/p revascularization.  Podiatry continues to recommend unlikely salvageability of right lower extremity.   Given the extensive exposure of the right Achilles tendon, significant surgical debridement would need to take place, potentially multiple procedures to ensure resection of entirety of necrotic tissue.  Extensive debridement would likely leave the patient with a nonfunctional Achilles tendon, limiting function of her right foot. It would also entail an extended prior of nonweightbearing and healing.  Patient would like time to consider her options as they pertain to quality of life.  She is aware that even if successful debridement is possible, it would be a combined effort with plastics to attempt coverage of the wound. There are no guarantees that attempt at limb salvage would be successful.  Plan to return tomorrow 2/2/24 for a finalized discussion regarding limb salvage vs. proximal amputation  Appreciate vascular surgery recommendations s/p revascularization  Continue local wound care. Appreciate nursing assistance with dressing changes.  Elevation on green foam wedges or pillows when non-ambulatory.  Rest of care per primary team.    Weight bearing status: Nonweightbearing to right lower extremity      SUBJECTIVE:     The patient was seen, evaluated, and assessed at bedside today. The patient was awake, alert, and in no acute distress. No acute events overnight.  "The patient reports that she is doing well after the vascular procedure but it is a lot to think about regarding the plan for her right lower extremity. Patient denies N/V/F/chills/SOB/CP.      OBJECTIVE:     Vitals:   /56 (BP Location: Left arm)   Pulse 86   Temp 99.9 °F (37.7 °C) (Oral)   Resp 18   Ht 5' 1\" (1.549 m)   Wt 66.1 kg (145 lb 11.6 oz)   SpO2 94%   BMI 27.53 kg/m²     Temp (24hrs), Av.9 °F (37.2 °C), Min:98.2 °F (36.8 °C), Max:99.9 °F (37.7 °C)      Physical Exam:     General:  Alert, cooperative, and in no distress.  Lower extremity exam:  Cardiovascular status at baseline.  Neurological status at baseline.  Musculoskeletal status at baseline. No calf tenderness noted.     Dressings to right lower extremity left intact at bedside.    Additional Data:     Labs:    Results from last 7 days   Lab Units 24  0509 24  0525 24  0619   WBC Thousand/uL 9.77   < > 8.05   HEMOGLOBIN g/dL 12.0   < > 13.2   I STAT HEMOGLOBIN   --    < >  --    HEMATOCRIT % 38.2   < > 40.8   HEMATOCRIT, ISTAT   --    < >  --    PLATELETS Thousands/uL 282   < > 332   NEUTROS PCT %  --   --  64   LYMPHS PCT %  --   --  22   MONOS PCT %  --   --  7   EOS PCT %  --   --  6    < > = values in this interval not displayed.     Results from last 7 days   Lab Units 24  0509 24  1558   POTASSIUM mmol/L 4.4  --    CHLORIDE mmol/L 106  --    CO2 mmol/L 26  --    CO2, I-STAT mmol/L  --  27   BUN mg/dL 16  --    CREATININE mg/dL 0.58*  --    CALCIUM mg/dL 9.2  --    GLUCOSE, ISTAT mg/dl  --  143*           * I Have Reviewed All Lab Data Listed Above.    Recent Cultures (last 7 days):               Imaging: I have personally reviewed pertinent films in PACS  EKG, Pathology, and Other Studies: I have personally reviewed pertinent reports.      ** Please Note: Portions of the record may have been created with voice recognition software. Occasional wrong word or \"sound a like\" substitutions may have " occurred due to the inherent limitations of voice recognition software. Read the chart carefully and recognize, using context, where substitutions have occurred. **

## 2024-02-02 NOTE — PLAN OF CARE
Problem: Prexisting or High Potential for Compromised Skin Integrity  Goal: Skin integrity is maintained or improved  Description: INTERVENTIONS:  - Identify patients at risk for skin breakdown  - Assess and monitor skin integrity  - Assess and monitor nutrition and hydration status  - Monitor labs   - Assess for incontinence   - Turn and reposition patient  - Assist with mobility/ambulation  - Relieve pressure over bony prominences  - Avoid friction and shearing  - Provide appropriate hygiene as needed including keeping skin clean and dry  - Evaluate need for skin moisturizer/barrier cream  - Collaborate with interdisciplinary team   - Patient/family teaching  - Consider wound care consult   Outcome: Progressing     Problem: PAIN - ADULT  Goal: Verbalizes/displays adequate comfort level or baseline comfort level  Description: Interventions:  - Encourage patient to monitor pain and request assistance  - Assess pain using appropriate pain scale  - Administer analgesics based on type and severity of pain and evaluate response  - Implement non-pharmacological measures as appropriate and evaluate response  - Consider cultural and social influences on pain and pain management  - Notify physician/advanced practitioner if interventions unsuccessful or patient reports new pain  Outcome: Progressing     Problem: INFECTION - ADULT  Goal: Absence or prevention of progression during hospitalization  Description: INTERVENTIONS:  - Assess and monitor for signs and symptoms of infection  - Monitor lab/diagnostic results  - Monitor all insertion sites, i.e. indwelling lines, tubes, and drains  - Monitor endotracheal if appropriate and nasal secretions for changes in amount and color  - [place] appropriate cooling/warming therapies per order  - Administer medications as ordered  - Instruct and encourage patient and family to use good hand hygiene technique  - Identify and instruct in appropriate isolation precautions for  identified infection/condition  Outcome: Progressing  Goal: Absence of fever/infection during neutropenic period  Description: INTERVENTIONS:  - Monitor WBC    Outcome: Progressing     Problem: SAFETY ADULT  Goal: Patient will remain free of falls  Description: INTERVENTIONS:  - Educate patient/family on patient safety including physical limitations  - Instruct patient to call for assistance with activity   - Consult OT/PT to assist with strengthening/mobility   - Keep Call bell within reach  - Keep bed low and locked with side rails adjusted as appropriate  - Keep care items and personal belongings within reach  - Initiate and maintain comfort rounds  - Make Fall Risk Sign visible to staff  - Apply yellow socks and bracelet for high fall risk patients  - Consider moving patient to room near nurses station  Outcome: Progressing  Goal: Maintain or return to baseline ADL function  Description: INTERVENTIONS:  -  Assess patient's ability to carry out ADLs; assess patient's baseline for ADL function and identify physical deficits which impact ability to perform ADLs (bathing, care of mouth/teeth, toileting, grooming, dressing, etc.)  - Assess/evaluate cause of self-care deficits   - Assess range of motion  - Assess patient's mobility; develop plan if impaired  - Assess patient's need for assistive devices and provide as appropriate  - Encourage maximum independence but intervene and supervise when necessary  - Involve family in performance of ADLs  - Assess for home care needs following discharge   - Consider OT consult to assist with ADL evaluation and planning for discharge  - Provide patient education as appropriate  Outcome: Progressing  Goal: Maintains/Returns to pre admission functional level  Description: INTERVENTIONS:  - Perform AM-PAC 6 Click Basic Mobility/ Daily Activity assessment daily.  - Set and communicate daily mobility goal to care team and patient/family/caregiver.   - Collaborate with rehabilitation  services on mobility goals if consulted  - Perform Range of Motion 2 times a day.  - Reposition patient every 2 hours.  - Dangle patient 2 times a day  - Stand patient 2 times a day  - Ambulate patient 2 times a day  - Out of bed to chair 2 times a day   - Out of bed for meals 2 times a day  - Out of bed for toileting  - Record patient progress and toleration of activity level   Outcome: Progressing     Problem: DISCHARGE PLANNING  Goal: Discharge to home or other facility with appropriate resources  Description: INTERVENTIONS:  - Identify barriers to discharge w/patient and caregiver  - Arrange for needed discharge resources and transportation as appropriate  - Identify discharge learning needs (meds, wound care, etc.)  - Arrange for interpretive services to assist at discharge as needed  - Refer to Case Management Department for coordinating discharge planning if the patient needs post-hospital services based on physician/advanced practitioner order or complex needs related to functional status, cognitive ability, or social support system  Outcome: Progressing     Problem: Knowledge Deficit  Goal: Patient/family/caregiver demonstrates understanding of disease process, treatment plan, medications, and discharge instructions  Description: Complete learning assessment and assess knowledge base.  Interventions:  - Provide teaching at level of understanding  - Provide teaching via preferred learning methods  Outcome: Progressing     Problem: SKIN/TISSUE INTEGRITY - ADULT  Goal: Skin Integrity remains intact(Skin Breakdown Prevention)  Description: Assess:  -Perform Brooks assessment   -Clean and moisturize skin   -Inspect skin when repositioning, toileting, and assisting with ADLS  -Assess under medical devices   -Assess extremities for adequate circulation and sensation     Bed Management:  -Have minimal linens on bed & keep smooth, unwrinkled  -Change linens as needed when moist or perspiring    Toileting:  -Offer  bedside commode  -Assess for incontinence   -Use incontinent care products after each incontinent episode    Skin Care:  -Avoid use of baby powder, tape, friction and shearing, hot water or constrictive clothing  -Relieve pressure over bony prominences   -Do not massage red bony areas    Next Steps:  -Teach patient strategies to minimize risks   -Consider consults to  interdisciplinary teams   Outcome: Progressing  Goal: Incision(s), wounds(s) or drain site(s) healing without S/S of infection  Description: INTERVENTIONS  - Assess and document dressing, incision, wound bed, drain sites and surrounding tissue  - Provide patient and family education  - Perform skin care/dressing changes  Outcome: Progressing

## 2024-02-02 NOTE — ASSESSMENT & PLAN NOTE
Lab Results   Component Value Date    HGBA1C 6.5 (H) 11/07/2023       Recent Labs     02/01/24  1607 02/01/24  2102 02/02/24  0616 02/02/24  1050   POCGLU 103 162* 192* 215*         Blood Sugar Average: Last 72 hrs:  (P) [id number]  C/w Humalog AC  C/w ISS

## 2024-02-02 NOTE — PROGRESS NOTES
[facility] Podiatry - Progress Note  Patient: Lona Cedeno 78 y.o. female   MRN: [id number]  PCP: Vivek Preston DO  Unit/Bed#: [facility] 503-01 Encounter: [id number]  Date Of Visit: 02/02/24    ASSESSMENT:    Lona Cedeno is a 78 y.o. female with:    Right posterior ankle ulcer with exposed achilles tendon  Right medial ankle ulcer, limited to breakdown of skin  Osteomyelitis of right second toe  T2DM  PAD  Tobacco use disorder  Diabetic polyneuropathy      PLAN:    Patient was seen and evaluated at bedside.  Dressing changed today  Further discussion with patient regarding limb salvage including proximal amputation versus limb salvage with possible staged procedures consisting of debridement of right Achilles tendon and ulcer with application of cellular graft matrix (Stravix), with possible wound VAC.  Advised patient that should Achilles tendon be noted to consist of a majority of necrotic tissue that her right foot would be nonfunctional and a proximal amputation would be recommended.  Patient to discuss further with her family tonight, plan to follow-up patient and family's decision regarding right lower extremity.  Continue local wound care consisting of Betadine Adaptic DSD to right posterior ankle ulceration with exposed Achilles and Dermagran to the anterior medial right ankle wound with DSD, appreciate nursing assistance with dressing changes.  Wound care orders updated  Elevation on green foam wedges or pillows when non-ambulatory.  Rest of care per primary team.    Weight bearing status: Nonweightbearing to right lower extremity      SUBJECTIVE:     The patient was seen, evaluated, and assessed at bedside today. The patient was awake, alert, and in no acute distress. No acute events overnight. The patient reports no pain in the right lower extremity and diffuse weighing her options for surgical intervention. Patient denies N/V/F/chills/SOB/CP.      OBJECTIVE:     Vitals:   /55 (BP Location: Left  "arm)   Pulse 93   Temp 99.3 °F (37.4 °C) (Oral)   Resp 18   Ht 5' 1\" (1.549 m)   Wt 66.1 kg (145 lb 11.6 oz)   SpO2 91%   BMI 27.53 kg/m²     Temp (24hrs), Av.3 °F (37.4 °C), Min:98.8 °F (37.1 °C), Max:99.9 °F (37.7 °C)      Physical Exam:     General:  Alert, cooperative, and in no distress.  Lower extremity exam:  Cardiovascular status at baseline.  Neurological status at baseline.  Musculoskeletal status at baseline. No calf tenderness noted.     Lower extremity wound(s) as noted below:  Wound #: 1  Location: Right anterior medial ankle  Length 2.0 cm: Width 3.0 cm: Depth 0.3 cm:   Deepest Tissue Noted in Base: Subcutaneous  Probe to Bone: No  Peripheral Skin Description: Attached  Granulation: 60% Fibrotic Tissue: 30% Necrotic Tissue: 10%   Drainage Amount: minimal, serosanguinous  Signs of Infection: No    Wound #: 2  Location: Right posterior ankle with exposed Achilles tendon  Length 5.8 cm: Width 2.5 cm: Depth 1.5 cm:   Deepest Tissue Noted in Base: Achilles tendon  Probe to Bone: No  Peripheral Skin Description: Attached  Granulation: 10% Fibrotic Tissue: 20% Necrotic Tissue: 70%   Drainage Amount: minimal, serous  Signs of Infection: No    Clinical Images 24:            Additional Data:     Labs:    Results from last 7 days   Lab Units 24  0604 24  0525 24  0619   WBC Thousand/uL 10.18*   < > 8.05   HEMOGLOBIN g/dL 11.7   < > 13.2   I STAT HEMOGLOBIN   --    < >  --    HEMATOCRIT % 37.1   < > 40.8   HEMATOCRIT, ISTAT   --    < >  --    PLATELETS Thousands/uL 238   < > 332   NEUTROS PCT %  --   --  64   LYMPHS PCT %  --   --  22   MONOS PCT %  --   --  7   EOS PCT %  --   --  6    < > = values in this interval not displayed.     Results from last 7 days   Lab Units 24  0509 24  1558   POTASSIUM mmol/L 4.4  --    CHLORIDE mmol/L 106  --    CO2 mmol/L 26  --    CO2, I-STAT mmol/L  --  27   BUN mg/dL 16  --    CREATININE mg/dL 0.58*  --    CALCIUM mg/dL 9.2  --  " "  GLUCOSE, ISTAT mg/dl  --  143*           * I Have Reviewed All Lab Data Listed Above.    Recent Cultures (last 7 days):               Imaging: I have personally reviewed pertinent films in PACS  EKG, Pathology, and Other Studies: I have personally reviewed pertinent reports.      ** Please Note: Portions of the record may have been created with voice recognition software. Occasional wrong word or \"sound a like\" substitutions may have occurred due to the inherent limitations of voice recognition software. Read the chart carefully and recognize, using context, where substitutions have occurred. **      "

## 2024-02-02 NOTE — PROGRESS NOTES
[facility]  Progress Note  Name: Lona Cedeno I  MRN: [id number]  Unit/Bed#: [facility] 503-01 I Date of Admission: 1/26/2024   Date of Service: 2/2/2024 I Hospital Day: 7    Assessment/Plan   Constipation  Assessment & Plan  Bowel regimen   Reports she had BM 1/29     Tobacco use disorder  Assessment & Plan  Nicotine TD    PAD (peripheral artery disease) (HCC)  Assessment & Plan  ASA and Lipitor  LDL 80  Vascular consulted for potential bypass surgery with wounds as above   [place] [facility] [place]    Type 2 diabetes mellitus with diabetic polyneuropathy ([facility])  Assessment & Plan  Lab Results   Component Value Date    HGBA1C 6.5 (H) 11/07/2023       Recent Labs     02/01/24  1607 02/01/24  2102 02/02/24  0616 02/02/24  1050   POCGLU 103 162* 192* 215*         Blood Sugar Average: Last 72 hrs:  (P) 137.0510436313585498  C/w Humalog AC  C/w ISS    * Diabetic foot ulcer with osteomyelitis (HCC)  Assessment & Plan  Tx to [facility] for vascular eval/ongoing podiatry eval   Will need bypass w/ vascular before podiatry takes to OR   Podiatry and vascular d/w pt and family, patient sp bypass with vascular, she is still undecideded about proceeding to bka  Cardio provided clearance for any procedures needed  RLE [facility]B  [place] [facility] [place] rec to watch off abx  Continue on lovenox pending procedure               VTE Pharmacologic Prophylaxis: VTE Score: 4 High Risk (Score >/= 5) - Pharmacological DVT Prophylaxis Ordered: enoxaparin (Lovenox). Sequential Compression Devices Ordered.    Mobility:   Basic Mobility Inpatient Raw Score: 19  JH-HLM Goal: 6: Walk 10 steps or more  JH-HLM Achieved: 5: Stand (1 or more minutes)  HLM Goal achieved. Continue to encourage appropriate mobility.    Patient Centered Rounds: I performed bedside rounds with nursing staff today.   Discussions with Specialists or Other Care Team Provider:     Education and Discussions with Family / Patient: Patient declined call  to .     Total Time Spent on Date of Encounter in care of patient:  mins. This time was spent on one or more of the following: performing physical exam; counseling and coordination of care; obtaining or reviewing history; documenting in the medical record; reviewing/ordering tests, medications or procedures; communicating with other healthcare professionals and discussing with patient's family/caregivers.    Current Length of Stay: 7 day(s)  Current Patient Status: Inpatient   Certification Statement: The patient will continue to require additional inpatient hospital stay due to possible bka  Discharge Plan: Anticipate discharge in >72 hrs to rehab facility.    Code Status: Level 1 - Full Code    Subjective:   Patient denies any acute complaints today    Objective:     Vitals:   Temp (24hrs), Av.4 °F (37.4 °C), Min:98.8 °F (37.1 °C), Max:99.9 °F (37.7 °C)    Temp:  [98.8 °F (37.1 °C)-99.9 °F (37.7 °C)] 99.4 °F (37.4 °C)  HR:  [86-93] 89  Resp:  [18-20] 20  BP: (114-144)/(55-61) 144/61  SpO2:  [84 %-95 %] 95 %  Body mass index is 27.53 kg/m².     Input and Output Summary (last 24 hours):     Intake/Output Summary (Last 24 hours) at 2024 1426  Last data filed at 2024 1401  Gross per 24 hour   Intake 1690 ml   Output 1500 ml   Net 190 ml       Physical Exam:   Physical Exam  Vitals and nursing note reviewed.   Constitutional:       General: She is not in acute distress.     Appearance: She is well-developed. She is not toxic-appearing or diaphoretic.   HENT:      Head: Normocephalic and atraumatic.   Eyes:      General: No scleral icterus.     Conjunctiva/sclera: Conjunctivae normal.   Cardiovascular:      Rate and Rhythm: Normal rate and regular rhythm.      Heart sounds: No murmur heard.     No friction rub. No gallop.   Pulmonary:      Effort: Pulmonary effort is normal. No respiratory distress.      Breath sounds: Normal breath sounds. No stridor. No wheezing, rhonchi or rales.   Chest:       Chest wall: No tenderness.   Abdominal:      General: There is no distension.      Palpations: Abdomen is soft. There is no mass.      Tenderness: There is no abdominal tenderness. There is no guarding or rebound.      Hernia: No hernia is present.   Musculoskeletal:         General: No swelling or tenderness.      Cervical back: Neck supple.      Comments: Lower extremity wound   Leg wrapped c/d/i   Skin:     General: Skin is warm and dry.      Capillary Refill: Capillary refill takes less than 2 seconds.   Neurological:      Mental Status: She is alert and oriented to person, place, and time.   Psychiatric:         Mood and Affect: Mood normal.          Additional Data:     Labs:  Results from last 7 days   Lab Units 02/02/24  0604 01/30/24  0525 01/28/24  0619   WBC Thousand/uL 10.18*   < > 8.05   HEMOGLOBIN g/dL 11.7   < > 13.2   I STAT HEMOGLOBIN   --    < >  --    HEMATOCRIT % 37.1   < > 40.8   HEMATOCRIT, ISTAT   --    < >  --    PLATELETS Thousands/uL 238   < > 332   NEUTROS PCT %  --   --  64   LYMPHS PCT %  --   --  22   MONOS PCT %  --   --  7   EOS PCT %  --   --  6    < > = values in this interval not displayed.     Results from last 7 days   Lab Units 02/02/24  0604   SODIUM mmol/L 135   POTASSIUM mmol/L 4.8   CHLORIDE mmol/L 102   CO2 mmol/L 30   BUN mg/dL 13   CREATININE mg/dL 0.47*   ANION GAP mmol/L 3   CALCIUM mg/dL 9.0   GLUCOSE RANDOM mg/dL 157*         Results from last 7 days   Lab Units 02/02/24  1050 02/02/24  0616 02/01/24  2102 02/01/24  1607 02/01/24  1116 02/01/24  0557 01/31/24  2042 01/31/24  1750 01/31/24  1128 01/31/24  0518 01/30/24  2150 01/30/24  1639   POC GLUCOSE mg/dl 215* 192* 162* 103 123 160* 118 132 130 132 137 75               Lines/Drains:  Invasive Devices       Peripheral Intravenous Line  Duration             Peripheral IV 02/02/24 Right;Upper;Ventral (anterior) Arm <1 day              Drain  Duration             Ureteral Internal Stent Left ureter 6 Fr. 74 days                           Imaging: No pertinent imaging reviewed.    Recent Cultures (last 7 days):         Last 24 Hours Medication List:   Current Facility-Administered Medications   Medication Dose Route Frequency Provider Last Rate    acetaminophen  975 mg Oral Q8H [facility] Sandra Dagoberto, PA-C      albuterol  2 puff Inhalation Q6H PRN Sandra Dagoberto, PA-C      AMILoride  5 mg Oral Daily Sandra Dagoberto, PA-C      aspirin  81 mg Oral Daily Sandra Dagoberto, PA-C      atorvastatin  10 mg Oral Daily With Dinner Sandra Arenas, PA-C      buPROPion  300 mg Oral Daily Sandra Dagoberto, PA-C      clonazePAM  1 mg Oral QPM Sandrajahaira Arenas, PA-C      docusate sodium  100 mg Oral BID Sandra Dagoberto, PA-C      enoxaparin  1 mg/kg Subcutaneous Q12H [facility] Ming Buchanan DO      fish oil  1,000 mg Oral Daily Sandra Dagoberto, PA-C      fluticasone-vilanterol  1 puff Inhalation Daily Sandra Dagoberto, PA-C      HYDROmorphone  0.2 mg Intravenous Q4H PRN Sandrajahaira Arenas, PA-C      insulin lispro  1-5 Units Subcutaneous TID AC Sandra Arenas, PA-C      insulin lispro  1-5 Units Subcutaneous HS Sandra Dagoberto, PA-C      insulin lispro  5 Units Subcutaneous TID With Meals Sandra Arenas, PA-C      [facility]ocarbamol  250 mg [facility]H [facility]      nicotine  1 patch Transdermal Daily Sandra Dagoberto, PA-C      ondansetron  4 mg Intravenous Q6H PRN Sandra Dagoberto, PA-C      oxybutynin  5 mg Oral Daily Sandra Dagoberto, PA-C      oxyCODONE  2.5 mg Oral Q4H PRN Sandrajahaira Arenas, PA-C      Or    oxyCODONE  5 mg Oral Q4H PRN Sandrajahaira Arenas, PA-C      pantoprazole  40 mg Oral Early Morning Sandrajahaira Arenas, PA-C      polyethylene glycol  17 g Oral Daily PRN Sandrajahaira Arenas, PA-C      pregabalin  200 mg Oral TID Sandra Arenas, PA-C      senna  1 tablet Oral  Sandra Arenas, PA-C          Today, Patient Was Seen By: Ming Banai, DO    **Please Note: This note may have been constructed using a voice recognition system.**

## 2024-02-02 NOTE — PLAN OF CARE
Problem: OCCUPATIONAL THERAPY ADULT  Goal: Performs self-care activities at highest level of function for planned discharge setting.  See evaluation for individualized goals.  Description: Treatment Interventions: ADL retraining, Functional transfer training, Endurance training, UE strengthening/ROM, Patient/family training, Equipment evaluation/education, Compensatory technique education, Continued evaluation, Energy conservation, Activityengagement          See flowsheet documentation for full assessment, interventions and recommendations.   Outcome: Progressing  Note: Limitation: Decreased ADL status, Decreased Safe judgement during ADL, Decreased endurance, Decreased self-care trans, Decreased high-level ADLs  Prognosis: Good  Assessment: Pt was seen on 2/2/2024 to address ADL retraining, functional transfer training, and activity tolerance/endurance. Pt NWB R LE. Pt demonstrating improvements and is currently requires MIN A to complete seated UB ADLs, grooming, and toileting. Pt requires MOD A for LB bathing and total assist to don L sock. Pt requires MIN A with rw to complete functional transfers and MOD A for functional mobility.Pt requiring vc frequently to maintain NWB status of R LE. Pt is limited by decreased ADL status, decreased functional mobility, and decreased ability to complete functional transfers. Pt supine in bed at beginning of session and seated in bedside chair at end of session with alarm set and all items within reach. The patient's raw score on the AM-PAC Daily Activity Inpatient Short Form is 18. A raw score of less than 19 suggests the patient may benefit from discharge to post-acute rehabilitation services. Please refer to the recommendation of the Occupational Therapist for safe discharge planning. Recommend level I Maximum Resource intensity OT services at d/c to maximize pt function.     Rehab Resource Intensity Level, OT: I (Maximum Resource Intensity)

## 2024-02-03 LAB
ANION GAP SERPL CALCULATED.3IONS-SCNC: 8 MMOL/L
ANION GAP SERPL CALCULATED.3IONS-SCNC: 8 MMOL/L
BASOPHILS # BLD AUTO: 0.03 THOUSANDS/ÂΜL (ref 0–0.1)
BASOPHILS # BLD AUTO: 0.03 THOUSANDS/ÂΜL (ref 0–0.1)
BASOPHILS NFR BLD AUTO: 0 % (ref 0–1)
BASOPHILS NFR BLD AUTO: 0 % (ref 0–1)
BUN SERPL-MCNC: 11 MG/DL (ref 5–25)
BUN SERPL-MCNC: 15 MG/DL (ref 5–25)
CALCIUM SERPL-MCNC: 10.2 MG/DL (ref 8.4–10.2)
CALCIUM SERPL-MCNC: 9.5 MG/DL (ref 8.4–10.2)
CHLORIDE SERPL-SCNC: 96 MMOL/L (ref 96–108)
CHLORIDE SERPL-SCNC: 96 MMOL/L (ref 96–108)
CO2 SERPL-SCNC: 26 MMOL/L (ref 21–32)
CO2 SERPL-SCNC: 28 MMOL/L (ref 21–32)
CREAT SERPL-MCNC: 0.59 MG/DL (ref 0.6–1.3)
CREAT SERPL-MCNC: 0.6 MG/DL (ref 0.6–1.3)
EOSINOPHIL # BLD AUTO: 0.58 THOUSAND/ÂΜL (ref 0–0.61)
EOSINOPHIL # BLD AUTO: 0.89 THOUSAND/ÂΜL (ref 0–0.61)
EOSINOPHIL NFR BLD AUTO: 5 % (ref 0–6)
EOSINOPHIL NFR BLD AUTO: 8 % (ref 0–6)
ERYTHROCYTE [DISTWIDTH] IN BLOOD BY AUTOMATED COUNT: 13.1 % (ref 11.6–15.1)
ERYTHROCYTE [DISTWIDTH] IN BLOOD BY AUTOMATED COUNT: 13.2 % (ref 11.6–15.1)
GFR SERPL CREATININE-BSD FRML MDRD: 88 ML/MIN/1.73SQ M
GFR SERPL CREATININE-BSD FRML MDRD: 88 ML/MIN/1.73SQ M
GLUCOSE SERPL-MCNC: 117 MG/DL (ref 65–140)
GLUCOSE SERPL-MCNC: 126 MG/DL (ref 65–140)
GLUCOSE SERPL-MCNC: 134 MG/DL (ref 65–140)
GLUCOSE SERPL-MCNC: 146 MG/DL (ref 65–140)
GLUCOSE SERPL-MCNC: 164 MG/DL (ref 65–140)
GLUCOSE SERPL-MCNC: 169 MG/DL (ref 65–140)
HCT VFR BLD AUTO: 34.7 % (ref 34.8–46.1)
HCT VFR BLD AUTO: 40.9 % (ref 34.8–46.1)
HGB BLD-MCNC: 11.6 G/DL (ref 11.5–15.4)
HGB BLD-MCNC: 13 G/DL (ref 11.5–15.4)
IMM GRANULOCYTES # BLD AUTO: 0.03 THOUSAND/UL (ref 0–0.2)
IMM GRANULOCYTES # BLD AUTO: 0.07 THOUSAND/UL (ref 0–0.2)
IMM GRANULOCYTES NFR BLD AUTO: 0 % (ref 0–2)
IMM GRANULOCYTES NFR BLD AUTO: 1 % (ref 0–2)
INR PPP: 1.11 (ref 0.84–1.19)
LYMPHOCYTES # BLD AUTO: 2.02 THOUSANDS/ÂΜL (ref 0.6–4.47)
LYMPHOCYTES # BLD AUTO: 2.46 THOUSANDS/ÂΜL (ref 0.6–4.47)
LYMPHOCYTES NFR BLD AUTO: 18 % (ref 14–44)
LYMPHOCYTES NFR BLD AUTO: 22 % (ref 14–44)
MCH RBC QN AUTO: 30.1 PG (ref 26.8–34.3)
MCH RBC QN AUTO: 31.4 PG (ref 26.8–34.3)
MCHC RBC AUTO-ENTMCNC: 31.8 G/DL (ref 31.4–37.4)
MCHC RBC AUTO-ENTMCNC: 33.4 G/DL (ref 31.4–37.4)
MCV RBC AUTO: 94 FL (ref 82–98)
MCV RBC AUTO: 95 FL (ref 82–98)
MONOCYTES # BLD AUTO: 0.46 THOUSAND/ÂΜL (ref 0.17–1.22)
MONOCYTES # BLD AUTO: 0.77 THOUSAND/ÂΜL (ref 0.17–1.22)
MONOCYTES NFR BLD AUTO: 4 % (ref 4–12)
MONOCYTES NFR BLD AUTO: 7 % (ref 4–12)
NEUTROPHILS # BLD AUTO: 7.23 THOUSANDS/ÂΜL (ref 1.85–7.62)
NEUTROPHILS # BLD AUTO: 8.16 THOUSANDS/ÂΜL (ref 1.85–7.62)
NEUTS SEG NFR BLD AUTO: 62 % (ref 43–75)
NEUTS SEG NFR BLD AUTO: 73 % (ref 43–75)
NRBC BLD AUTO-RTO: 0 /100 WBCS
NRBC BLD AUTO-RTO: 0 /100 WBCS
PLATELET # BLD AUTO: 239 THOUSANDS/UL (ref 149–390)
PLATELET # BLD AUTO: 266 THOUSANDS/UL (ref 149–390)
PMV BLD AUTO: 9.6 FL (ref 8.9–12.7)
PMV BLD AUTO: 9.8 FL (ref 8.9–12.7)
POTASSIUM SERPL-SCNC: 3.8 MMOL/L (ref 3.5–5.3)
POTASSIUM SERPL-SCNC: 4.5 MMOL/L (ref 3.5–5.3)
PROCALCITONIN SERPL-MCNC: 0.06 NG/ML
PROTHROMBIN TIME: 14.2 SECONDS (ref 11.6–14.5)
RBC # BLD AUTO: 3.7 MILLION/UL (ref 3.81–5.12)
RBC # BLD AUTO: 4.32 MILLION/UL (ref 3.81–5.12)
SODIUM SERPL-SCNC: 130 MMOL/L (ref 135–147)
SODIUM SERPL-SCNC: 132 MMOL/L (ref 135–147)
WBC # BLD AUTO: 11.28 THOUSAND/UL (ref 4.31–10.16)
WBC # BLD AUTO: 11.45 THOUSAND/UL (ref 4.31–10.16)

## 2024-02-03 PROCEDURE — 82948 REAGENT STRIP/BLOOD GLUCOSE: CPT

## 2024-02-03 PROCEDURE — 99232 SBSQ HOSP IP/OBS MODERATE 35: CPT | Performed by: INTERNAL MEDICINE

## 2024-02-03 PROCEDURE — 85025 COMPLETE CBC W/AUTO DIFF WBC: CPT | Performed by: INTERNAL MEDICINE

## 2024-02-03 PROCEDURE — NC001 PR NO CHARGE: Performed by: PODIATRIST

## 2024-02-03 PROCEDURE — 84145 PROCALCITONIN (PCT): CPT | Performed by: INTERNAL MEDICINE

## 2024-02-03 PROCEDURE — 85610 PROTHROMBIN TIME: CPT | Performed by: INTERNAL MEDICINE

## 2024-02-03 PROCEDURE — 80048 BASIC METABOLIC PNL TOTAL CA: CPT | Performed by: INTERNAL MEDICINE

## 2024-02-03 RX ADMIN — PREGABALIN 200 MG: 100 CAPSULE ORAL at 21:03

## 2024-02-03 RX ADMIN — PREGABALIN 200 MG: 100 CAPSULE ORAL at 08:33

## 2024-02-03 RX ADMIN — NICOTINE 1 PATCH: 21 PATCH, EXTENDED RELEASE TRANSDERMAL at 08:35

## 2024-02-03 RX ADMIN — PREGABALIN 200 MG: 100 CAPSULE ORAL at 16:25

## 2024-02-03 RX ADMIN — OMEGA-3 FATTY ACIDS CAP 1000 MG 1000 MG: 1000 CAP at 08:33

## 2024-02-03 RX ADMIN — ACETAMINOPHEN 975 MG: 325 TABLET, FILM COATED ORAL at 05:48

## 2024-02-03 RX ADMIN — CLONAZEPAM 1 MG: 1 TABLET ORAL at 17:46

## 2024-02-03 RX ADMIN — BUPROPION HYDROCHLORIDE 300 MG: 150 TABLET, FILM COATED, EXTENDED RELEASE ORAL at 08:34

## 2024-02-03 RX ADMIN — DOCUSATE SODIUM 100 MG: 100 CAPSULE, LIQUID FILLED ORAL at 17:46

## 2024-02-03 RX ADMIN — ACETAMINOPHEN 975 MG: 325 TABLET, FILM COATED ORAL at 21:02

## 2024-02-03 RX ADMIN — ENOXAPARIN SODIUM 70 MG: 80 INJECTION SUBCUTANEOUS at 08:35

## 2024-02-03 RX ADMIN — SENNOSIDES 8.6 MG: 8.6 TABLET, FILM COATED ORAL at 21:02

## 2024-02-03 RX ADMIN — INSULIN LISPRO 1 UNITS: 100 INJECTION, SOLUTION INTRAVENOUS; SUBCUTANEOUS at 11:09

## 2024-02-03 RX ADMIN — ACETAMINOPHEN 975 MG: 325 TABLET, FILM COATED ORAL at 13:35

## 2024-02-03 RX ADMIN — INSULIN LISPRO 5 UNITS: 100 INJECTION, SOLUTION INTRAVENOUS; SUBCUTANEOUS at 11:09

## 2024-02-03 RX ADMIN — METHOCARBAMOL 250 MG: 500 TABLET ORAL at 21:02

## 2024-02-03 RX ADMIN — ATORVASTATIN CALCIUM 10 MG: 10 TABLET, FILM COATED ORAL at 16:25

## 2024-02-03 RX ADMIN — METHOCARBAMOL 250 MG: 500 TABLET ORAL at 05:48

## 2024-02-03 RX ADMIN — AMILORIDE HYDROCLORIDE 5 MG: 5 TABLET ORAL at 11:08

## 2024-02-03 RX ADMIN — FLUTICASONE FUROATE AND VILANTEROL TRIFENATATE 1 PUFF: 200; 25 POWDER RESPIRATORY (INHALATION) at 08:35

## 2024-02-03 RX ADMIN — METHOCARBAMOL 250 MG: 500 TABLET ORAL at 13:35

## 2024-02-03 RX ADMIN — INSULIN LISPRO 5 UNITS: 100 INJECTION, SOLUTION INTRAVENOUS; SUBCUTANEOUS at 16:43

## 2024-02-03 RX ADMIN — OXYBUTYNIN CHLORIDE 5 MG: 5 TABLET, EXTENDED RELEASE ORAL at 08:34

## 2024-02-03 RX ADMIN — PANTOPRAZOLE SODIUM 40 MG: 40 TABLET, DELAYED RELEASE ORAL at 08:33

## 2024-02-03 RX ADMIN — DOCUSATE SODIUM 100 MG: 100 CAPSULE, LIQUID FILLED ORAL at 08:33

## 2024-02-03 RX ADMIN — ASPIRIN 81 MG: 81 TABLET, COATED ORAL at 08:34

## 2024-02-03 NOTE — ASSESSMENT & PLAN NOTE
Lab Results   Component Value Date    HGBA1C 6.5 (H) 11/07/2023       Recent Labs     02/02/24  1547 02/02/24 2059 02/03/24  0559 02/03/24  1033   POCGLU 121 141* 146* 164*         Blood Sugar Average: Last 72 hrs:  (P) 145.5888910371724803  C/w Humalog AC  C/w ISS

## 2024-02-03 NOTE — PLAN OF CARE
Problem: Prexisting or High Potential for Compromised Skin Integrity  Goal: Skin integrity is maintained or improved  Description: INTERVENTIONS:  - Identify patients at risk for skin breakdown  - Assess and monitor skin integrity  - Assess and monitor nutrition and hydration status  - Monitor labs   - Assess for incontinence   - Turn and reposition patient  - Assist with mobility/ambulation  - Relieve pressure over bony prominences  - Avoid friction and shearing  - Provide appropriate hygiene as needed including keeping skin clean and dry  - Evaluate need for skin moisturizer/barrier cream  - Collaborate with interdisciplinary team   - Patient/family teaching  - Consider wound care consult   Outcome: Progressing     Problem: PAIN - ADULT  Goal: Verbalizes/displays adequate comfort level or baseline comfort level  Description: Interventions:  - Encourage patient to monitor pain and request assistance  - Assess pain using appropriate pain scale  - Administer analgesics based on type and severity of pain and evaluate response  - Implement non-pharmacological measures as appropriate and evaluate response  - Consider cultural and social influences on pain and pain management  - Notify physician/advanced practitioner if interventions unsuccessful or patient reports new pain  Outcome: Progressing

## 2024-02-03 NOTE — QUICK NOTE
Podiatry Quick Note     Due to OR and surgeon scheduling, surgery is cancelled today     Patient will now go to the OR tomorrow, 2/4/24 with Dr. Winston Richards placed now, patient will be NPO at midnight     Discussed with primary team.

## 2024-02-03 NOTE — ASSESSMENT & PLAN NOTE
Tx to [facility] for vascular eval/ongoing podiatry eval   Will need bypass w/ vascular before podiatry takes to OR   Podiatry and vascular d/w pt and family, patient sp bypass with vascular, she is still undecideded about proceeding to bka-discussion held with family today, podiatry will also be discussing with patient and family for finalization of plan  Cardio provided clearance for any procedures needed  RLE NWB  [place] [facility] and [place] to watch off abx  Continue on lovenox pending procedure

## 2024-02-03 NOTE — PROGRESS NOTES
[facility]  Progress Note  Name: Lona Cedeno I  MRN: [id number]  Unit/Bed#: [facility] 503-01 I Date of Admission: 1/26/2024   Date of Service: 2/3/2024 I Hospital Day: 8    Assessment/Plan   Constipation  Assessment & Plan  Bowel regimen   Reports she had BM 1/29     Tobacco use disorder  Assessment & Plan  Nicotine TD    COPD, severity to be determined (HCC)  Assessment & Plan  Patient currently stable on room air    PAD (peripheral artery disease) ([facility])  Assessment & Plan  ASA and Lipitor  LDL 80  Vascular consulted for potential bypass surgery with wounds as above   Cardio at [facility] cleared for bypass    Anxiety and depression  Assessment & Plan  Will continue on bupropion, patient with worsening reactive depression in the setting of possible limb loss    Type 2 diabetes mellitus with diabetic polyneuropathy ([facility])  Assessment & Plan  Lab Results   Component Value Date    HGBA1C 6.5 (H) 11/07/2023       Recent Labs     02/02/24  1547 02/02/24  2059 02/03/24  0559 02/03/24  1033   POCGLU 121 141* 146* 164*         Blood Sugar Average: Last 72 hrs:  (P) 145.2293339067267157  C/w Humalog AC  C/w ISS    * Diabetic foot ulcer with osteomyelitis ([facility])  Assessment & Plan  Tx to [facility] for vascular eval/ongoing podiatry eval   Will need bypass w/ vascular before podiatry takes to OR   Podiatry and vascular d/w pt and family, patient sp bypass with vascular, she is still undecideded about proceeding to bka-discussion held with family today, podiatry will also be discussing with patient and family for finalization of plan  Cardio provided clearance for any procedures needed  RLE NWB  ID eval at [facility] and rec to watch off abx  Continue on lovenox pending procedure               VTE Pharmacologic Prophylaxis: VTE Score: 4 Moderate Risk (Score 3-4) - Pharmacological DVT Prophylaxis Ordered: enoxaparin (Lovenox).    Mobility:   Basic Mobility Inpatient Raw Score: 15  -HLM Goal: 4: Move to  chair/commode  -HLM Achieved: 4: Move to chair/commode  HLM Goal achieved. Continue to encourage appropriate mobility.    Patient Centered Rounds: I performed bedside rounds with nursing staff today.   Discussions with Specialists or Other Care Team Provider:     Education and Discussions with Family / Patient: Updated  (significant other) at bedside.    Total Time Spent on Date of Encounter in care of patient:  mins. This time was spent on one or more of the following: performing physical exam; counseling and coordination of care; obtaining or reviewing history; documenting in the medical record; reviewing/ordering tests, medications or procedures; communicating with other healthcare professionals and discussing with patient's family/caregivers.    Current Length of Stay: 8 day(s)  Current Patient Status: Inpatient   Certification Statement: The patient will continue to require additional inpatient hospital stay due to p[ending procedure  Discharge Plan: Anticipate discharge in >72 hrs to discharge location to be determined pending rehab evaluations.    Code Status: Level 1 - Full Code    Subjective:   Patient denies any acute complaints    Objective:     Vitals:   Temp (24hrs), Av.4 °F (36.9 °C), Min:97.8 °F (36.6 °C), Max:99.7 °F (37.6 °C)    Temp:  [97.8 °F (36.6 °C)-99.7 °F (37.6 °C)] 98.1 °F (36.7 °C)  HR:  [82-91] 82  Resp:  [17-20] 17  BP: (125-147)/(60-73) 125/60  SpO2:  [89 %-100 %] 94 %  Body mass index is 27.53 kg/m².     Input and Output Summary (last 24 hours):     Intake/Output Summary (Last 24 hours) at 2/3/2024 1438  Last data filed at 2/3/2024 1201  Gross per 24 hour   Intake 540 ml   Output 1700 ml   Net -1160 ml       Physical Exam:   Physical Exam  Vitals and nursing note reviewed.   Constitutional:       General: She is not in acute distress.     Appearance: She is well-developed. She is not toxic-appearing or diaphoretic.   HENT:      Head: Normocephalic and atraumatic.    Eyes:      General: No scleral icterus.     Conjunctiva/sclera: Conjunctivae normal.   Cardiovascular:      Rate and Rhythm: Normal rate and regular rhythm.      Heart sounds: No murmur heard.     No friction rub. No gallop.   Pulmonary:      Effort: Pulmonary effort is normal. No respiratory distress.      Breath sounds: Normal breath sounds. No stridor. No wheezing, rhonchi or rales.   Chest:      Chest wall: No tenderness.   Abdominal:      General: There is no distension.      Palpations: Abdomen is soft. There is no mass.      Tenderness: There is no abdominal tenderness. There is no guarding or rebound.      Hernia: No hernia is present.   Musculoskeletal:         General: No swelling or tenderness.      Cervical back: Neck supple.      Comments: Lower extremity wound   Leg wrapped c/d/i   Skin:     General: Skin is warm and dry.      Capillary Refill: Capillary refill takes less than 2 seconds.   Neurological:      Mental Status: She is alert and oriented to person, place, and time.   Psychiatric:         Mood and Affect: Mood normal.          Additional Data:     Labs:  Results from last 7 days   Lab Units 02/03/24  0557   WBC Thousand/uL 11.28*   HEMOGLOBIN g/dL 13.0   HEMATOCRIT % 40.9   PLATELETS Thousands/uL 266   NEUTROS PCT % 73   LYMPHS PCT % 18   MONOS PCT % 4   EOS PCT % 5     Results from last 7 days   Lab Units 02/03/24  0557   SODIUM mmol/L 132*   POTASSIUM mmol/L 4.5   CHLORIDE mmol/L 96   CO2 mmol/L 28   BUN mg/dL 11   CREATININE mg/dL 0.60   ANION GAP mmol/L 8   CALCIUM mg/dL 10.2   GLUCOSE RANDOM mg/dL 169*         Results from last 7 days   Lab Units 02/03/24  1033 02/03/24  0559 02/02/24  2059 02/02/24  1547 02/02/24  1050 02/02/24  0616 02/01/24  2102 02/01/24  1607 02/01/24  1116 02/01/24  0557 01/31/24  2042 01/31/24  1750   POC GLUCOSE mg/dl 164* 146* 141* 121 215* 192* 162* 103 123 160* 118 132         Results from last 7 days   Lab Units 02/03/24  0557   PROCALCITONIN ng/ml 0.06        Lines/Drains:  Invasive Devices       Peripheral Intravenous Line  Duration             Peripheral IV 02/02/24 Right;Upper;Ventral (anterior) Arm 1 day              Drain  Duration             Ureteral Internal Stent Left ureter 6 Fr. 75 days                          Imaging: No pertinent imaging reviewed.    Recent Cultures (last 7 days):         Last 24 Hours Medication List:   Current Facility-Administered Medications   Medication Dose Route Frequency Provider Last Rate    acetaminophen  975 mg Oral Q8H [facility] Sandra Dagoberto, PA-C      albuterol  2 puff Inhalation Q6H PRN Sandra Dagoberto, PA-C      AMILoride  5 mg Oral Daily Sandra Dagoberto, PA-C      aspirin  81 mg Oral Daily Sandra Dagoberto, PA-C      atorvastatin  10 mg Oral Daily With Dinner Sandra Arenas, PA-C      buPROPion  300 mg Oral Daily Sandra Dagoberto, PA-C      cefazolin  1,000 mg Intravenous On Call To OR Osiris Riley, DPM      clonazePAM  1 mg Oral QPM Sandra Arenas, PA-C      docusate sodium  100 mg Oral BID Sandra Hung, PA-C      enoxaparin  1 mg/kg Subcutaneous Q12H [facility], DO      fish oil  1,000 mg Oral Daily Sandra Dagoberto, PA-C      fluticasone-vilanterol  1 puff Inhalation Daily Sandra Dagoberto, PA-C      HYDROmorphone  0.2 mg Intravenous Q4H PRN Sandra Hung, PA-C      insulin lispro  1-5 Units Subcutaneous TID AC Sandra Arenas, PA-C      insulin lispro  1-5 Units Subcutaneous HS Sandra Dagoberto, PA-C      insulin lispro  5 Units Subcutaneous TID With Meals Sandra Arenas, PA-C      methocarbamol  250 mg Oral Q8H [facility] Dagoberto, PA-C      nicotine  1 patch Transdermal Daily Sandra Dagoberto, PA-C      ondansetron  4 mg Intravenous Q6H PRN Sandra Dagoberto, PA-C      oxybutynin  5 mg Oral Daily Sandra Dagoberto, PA-C      oxyCODONE  2.5 mg Oral Q4H PRN Sandra Dagoberto, PA-C      Or    oxyCODONE  5 mg Oral Q4H PRN Sandra Dagoberto, PA-C      pantoprazole  40 mg Oral Early Morning Sandra Arenas, PA-C      polyethylene glycol  17  g Oral Daily PRN Sandra Arenas PA-C      pregabalin  200 mg Oral TID Sandra Arenas PA-C      senna  1 tablet Oral HS Sandra Arenas PA-C          Today, Patient Was Seen By: Ming Buchanan DO    **Please Note: This note may have been constructed using a voice recognition system.**

## 2024-02-03 NOTE — TREATMENT PLAN
Podiatry Treatment Plan    Met with patient and family at bedside.  Long discussion about surgical intervention planned for tomorrow with podiatry, right Achilles debridement and with possible wound VAC application.  Answered all of family's questions regarding the procedure and recovery period, they are aware that this will be part of a multi staged limb salvage approach, no guarantees as to a successful outcome. Future procedures may include debridements, delayed closure, plastic surgery techniques, or more proximal amputations.     Patient and family acknowledged they are aware of risks, alternatives, and complications of the procedure.  Patient and family would like to proceed with surgical intervention as planned, patient will be NPO at midnight

## 2024-02-03 NOTE — ASSESSMENT & PLAN NOTE
ASA and Lipitor  LDL 80  Vascular consulted for potential bypass surgery with wounds as above   Cardio at [facility] cleared for bypass

## 2024-02-03 NOTE — ASSESSMENT & PLAN NOTE
Will continue on bupropion, patient with worsening reactive depression in the setting of possible limb loss

## 2024-02-03 NOTE — PROGRESS NOTES
"Podiatry - Progress Note  Patient: Lona Cedeno 78 y.o. female   MRN: [id number]  PCP: Vivek Preston DO  Unit/Bed#: [facility] 503-01 Encounter: [id number]  Date Of Visit: 24    ASSESSMENT:    Lona Cedeno is a 78 y.o. female with:    Right posterior ankle ulcer with exposed achilles tendon  Right medial ankle ulcer, limited to breakdown of skin  Osteomyelitis of right second toe  T2DM  PAD  Tobacco use disorder  Diabetic polyneuropathy    PLAN:    Tentative plan for patient to go to OR today,24, for right Achilles tendon debridement, possible graft application, possible wound VAC application with Dr. Montero pending surgeon and OR availability. Will update primary team if plan changes  Consent to be signed with surgeon prior to procedure.  Confirmed NPO status.  H&P, vitals, and current labs reviewed.  No acute changes noted.  Alternatives, risks, and complications discussed with patient.  All questions answered.  No guarantees given of outcome.  Rest of medical care per primary team.       SUBJECTIVE:     The patient was seen, evaluated, and assessed at bedside today. The patient was awake, alert, and in no acute distress. Patient confirmed NPO status. All questions and concerns regarding the surgical procedure addressed. Patient understands risks vs benefits of procedure and remains amenable with plan for surgery today. Patient denies N/V/F/chills/SOB/CP.      OBJECTIVE:     Vitals:   /62   Pulse 91   Temp 98 °F (36.7 °C)   Resp 20   Ht 5' 1\" (1.549 m)   Wt 66.1 kg (145 lb 11.6 oz)   SpO2 100%   BMI 27.53 kg/m²     Temp (24hrs), Av.9 °F (37.2 °C), Min:98 °F (36.7 °C), Max:99.7 °F (37.6 °C)      Physical Exam:     General:  Alert, cooperative, and in no distress.  Lower extremity exam:  Cardiovascular status at baseline.  Neurological status at baseline.  Musculoskeletal status at baseline. No calf tenderness noted bilaterally.     Dressing left intact to the Operating Room.     Additional " "Data:     Labs:    Results from last 7 days   Lab Units 02/03/24  0557   WBC Thousand/uL 11.28*   HEMOGLOBIN g/dL 13.0   HEMATOCRIT % 40.9   PLATELETS Thousands/uL 266   NEUTROS PCT % 73   LYMPHS PCT % 18   MONOS PCT % 4   EOS PCT % 5     Results from last 7 days   Lab Units 02/02/24  0604 02/01/24  0509 01/31/24  1558   POTASSIUM mmol/L 4.8   < >  --    CHLORIDE mmol/L 102   < >  --    CO2 mmol/L 30   < >  --    CO2, I-STAT mmol/L  --   --  27   BUN mg/dL 13   < >  --    CREATININE mg/dL 0.47*   < >  --    CALCIUM mg/dL 9.0   < >  --    GLUCOSE, ISTAT mg/dl  --   --  143*    < > = values in this interval not displayed.           * I Have Reviewed All Lab Data Listed Above.    Recent Cultures (last 7 days):               Imaging: I have personally reviewed pertinent films in PACS  EKG, Pathology, and Other Studies: I have personally reviewed pertinent reports.    ** Please Note: Portions of the record may have been created with voice recognition software. Occasional wrong word or \"sound a like\" substitutions may have occurred due to the inherent limitations of voice recognition software. Read the chart carefully and recognize, using context, where substitutions have occurred. **      "

## 2024-02-04 ENCOUNTER — ANESTHESIA EVENT (INPATIENT)
Dept: PERIOP | Facility: HOSPITAL | Age: 79
DRG: 253 | End: 2024-02-04
Payer: COMMERCIAL

## 2024-02-04 ENCOUNTER — ANESTHESIA (INPATIENT)
Dept: PERIOP | Facility: HOSPITAL | Age: 79
DRG: 253 | End: 2024-02-04
Payer: COMMERCIAL

## 2024-02-04 LAB
ALBUMIN SERPL BCP-MCNC: 3.1 G/DL (ref 3.5–5)
ALP SERPL-CCNC: 48 U/L (ref 34–104)
ALT SERPL W P-5'-P-CCNC: 4 U/L (ref 7–52)
ANION GAP SERPL CALCULATED.3IONS-SCNC: 6 MMOL/L
AST SERPL W P-5'-P-CCNC: 11 U/L (ref 13–39)
BASOPHILS # BLD AUTO: 0.04 THOUSANDS/ÂΜL (ref 0–0.1)
BASOPHILS NFR BLD AUTO: 0 % (ref 0–1)
BILIRUB SERPL-MCNC: 0.49 MG/DL (ref 0.2–1)
BUN SERPL-MCNC: 12 MG/DL (ref 5–25)
CALCIUM ALBUM COR SERPL-MCNC: 10.2 MG/DL (ref 8.3–10.1)
CALCIUM SERPL-MCNC: 9.5 MG/DL (ref 8.4–10.2)
CHLORIDE SERPL-SCNC: 99 MMOL/L (ref 96–108)
CO2 SERPL-SCNC: 29 MMOL/L (ref 21–32)
CREAT SERPL-MCNC: 0.5 MG/DL (ref 0.6–1.3)
EOSINOPHIL # BLD AUTO: 0.97 THOUSAND/ÂΜL (ref 0–0.61)
EOSINOPHIL NFR BLD AUTO: 11 % (ref 0–6)
ERYTHROCYTE [DISTWIDTH] IN BLOOD BY AUTOMATED COUNT: 13.1 % (ref 11.6–15.1)
GFR SERPL CREATININE-BSD FRML MDRD: 93 ML/MIN/1.73SQ M
GLUCOSE SERPL-MCNC: 119 MG/DL (ref 65–140)
GLUCOSE SERPL-MCNC: 119 MG/DL (ref 65–140)
GLUCOSE SERPL-MCNC: 140 MG/DL (ref 65–140)
GLUCOSE SERPL-MCNC: 144 MG/DL (ref 65–140)
GLUCOSE SERPL-MCNC: 192 MG/DL (ref 65–140)
HCT VFR BLD AUTO: 33.2 % (ref 34.8–46.1)
HGB BLD-MCNC: 10.9 G/DL (ref 11.5–15.4)
IMM GRANULOCYTES # BLD AUTO: 0.03 THOUSAND/UL (ref 0–0.2)
IMM GRANULOCYTES NFR BLD AUTO: 0 % (ref 0–2)
INR PPP: 1.03 (ref 0.84–1.19)
LYMPHOCYTES # BLD AUTO: 2.33 THOUSANDS/ÂΜL (ref 0.6–4.47)
LYMPHOCYTES NFR BLD AUTO: 26 % (ref 14–44)
MCH RBC QN AUTO: 30.8 PG (ref 26.8–34.3)
MCHC RBC AUTO-ENTMCNC: 32.8 G/DL (ref 31.4–37.4)
MCV RBC AUTO: 94 FL (ref 82–98)
MONOCYTES # BLD AUTO: 0.59 THOUSAND/ÂΜL (ref 0.17–1.22)
MONOCYTES NFR BLD AUTO: 7 % (ref 4–12)
NEUTROPHILS # BLD AUTO: 5.08 THOUSANDS/ÂΜL (ref 1.85–7.62)
NEUTS SEG NFR BLD AUTO: 56 % (ref 43–75)
NRBC BLD AUTO-RTO: 0 /100 WBCS
PLATELET # BLD AUTO: 247 THOUSANDS/UL (ref 149–390)
PMV BLD AUTO: 9.7 FL (ref 8.9–12.7)
POTASSIUM SERPL-SCNC: 4.1 MMOL/L (ref 3.5–5.3)
PROT SERPL-MCNC: 5.9 G/DL (ref 6.4–8.4)
PROTHROMBIN TIME: 13.4 SECONDS (ref 11.6–14.5)
RBC # BLD AUTO: 3.54 MILLION/UL (ref 3.81–5.12)
SODIUM SERPL-SCNC: 134 MMOL/L (ref 135–147)
WBC # BLD AUTO: 9.04 THOUSAND/UL (ref 4.31–10.16)

## 2024-02-04 PROCEDURE — 0X940ZZ DRAINAGE OF RIGHT AXILLA, OPEN APPROACH: ICD-10-PCS | Performed by: SURGERY

## 2024-02-04 PROCEDURE — 0LBS0ZZ EXCISION OF RIGHT ANKLE TENDON, OPEN APPROACH: ICD-10-PCS | Performed by: PODIATRIST

## 2024-02-04 PROCEDURE — 82948 REAGENT STRIP/BLOOD GLUCOSE: CPT

## 2024-02-04 PROCEDURE — 0Y6R0Z2 DETACHMENT AT RIGHT 2ND TOE, MID, OPEN APPROACH: ICD-10-PCS | Performed by: PODIATRIST

## 2024-02-04 PROCEDURE — 88311 DECALCIFY TISSUE: CPT | Performed by: PATHOLOGY

## 2024-02-04 PROCEDURE — 10140 I&D HMTMA SEROMA/FLUID COLLJ: CPT | Performed by: SURGERY

## 2024-02-04 PROCEDURE — 80053 COMPREHEN METABOLIC PANEL: CPT | Performed by: INTERNAL MEDICINE

## 2024-02-04 PROCEDURE — 0JBQ0ZZ EXCISION OF RIGHT FOOT SUBCUTANEOUS TISSUE AND FASCIA, OPEN APPROACH: ICD-10-PCS | Performed by: PODIATRIST

## 2024-02-04 PROCEDURE — 11046 DBRDMT MUSC&/FSCA EA ADDL: CPT | Performed by: PODIATRIST

## 2024-02-04 PROCEDURE — 97605 NEG PRS WND THER DME<=50SQCM: CPT | Performed by: PODIATRIST

## 2024-02-04 PROCEDURE — 11042 DBRDMT SUBQ TIS 1ST 20SQCM/<: CPT | Performed by: PODIATRIST

## 2024-02-04 PROCEDURE — 2W1SX6Z COMPRESSION OF RIGHT FOOT USING PRESSURE DRESSING: ICD-10-PCS | Performed by: PODIATRIST

## 2024-02-04 PROCEDURE — 88305 TISSUE EXAM BY PATHOLOGIST: CPT | Performed by: PATHOLOGY

## 2024-02-04 PROCEDURE — 28825 PARTIAL AMPUTATION OF TOE: CPT | Performed by: PODIATRIST

## 2024-02-04 PROCEDURE — 99232 SBSQ HOSP IP/OBS MODERATE 35: CPT | Performed by: INTERNAL MEDICINE

## 2024-02-04 PROCEDURE — NC001 PR NO CHARGE: Performed by: PODIATRIST

## 2024-02-04 PROCEDURE — 85025 COMPLETE CBC W/AUTO DIFF WBC: CPT | Performed by: INTERNAL MEDICINE

## 2024-02-04 PROCEDURE — 11043 DBRDMT MUSC&/FSCA 1ST 20/<: CPT | Performed by: PODIATRIST

## 2024-02-04 PROCEDURE — 85610 PROTHROMBIN TIME: CPT | Performed by: INTERNAL MEDICINE

## 2024-02-04 PROCEDURE — 99024 POSTOP FOLLOW-UP VISIT: CPT | Performed by: SURGERY

## 2024-02-04 RX ORDER — PROPOFOL 10 MG/ML
INJECTION, EMULSION INTRAVENOUS AS NEEDED
Status: DISCONTINUED | OUTPATIENT
Start: 2024-02-04 | End: 2024-02-04

## 2024-02-04 RX ORDER — CEFAZOLIN SODIUM 1 G/3ML
INJECTION, POWDER, FOR SOLUTION INTRAMUSCULAR; INTRAVENOUS AS NEEDED
Status: DISCONTINUED | OUTPATIENT
Start: 2024-02-04 | End: 2024-02-04

## 2024-02-04 RX ORDER — FENTANYL CITRATE 50 UG/ML
INJECTION, SOLUTION INTRAMUSCULAR; INTRAVENOUS AS NEEDED
Status: DISCONTINUED | OUTPATIENT
Start: 2024-02-04 | End: 2024-02-04

## 2024-02-04 RX ORDER — SODIUM CHLORIDE, SODIUM LACTATE, POTASSIUM CHLORIDE, CALCIUM CHLORIDE 600; 310; 30; 20 MG/100ML; MG/100ML; MG/100ML; MG/100ML
INJECTION, SOLUTION INTRAVENOUS CONTINUOUS PRN
Status: DISCONTINUED | OUTPATIENT
Start: 2024-02-04 | End: 2024-02-04

## 2024-02-04 RX ORDER — ONDANSETRON 2 MG/ML
INJECTION INTRAMUSCULAR; INTRAVENOUS AS NEEDED
Status: DISCONTINUED | OUTPATIENT
Start: 2024-02-04 | End: 2024-02-04

## 2024-02-04 RX ORDER — MAGNESIUM HYDROXIDE 1200 MG/15ML
LIQUID ORAL AS NEEDED
Status: DISCONTINUED | OUTPATIENT
Start: 2024-02-04 | End: 2024-02-04 | Stop reason: HOSPADM

## 2024-02-04 RX ORDER — HYDROMORPHONE HCL/PF 1 MG/ML
0.5 SYRINGE (ML) INJECTION
Status: DISCONTINUED | OUTPATIENT
Start: 2024-02-04 | End: 2024-02-04 | Stop reason: HOSPADM

## 2024-02-04 RX ORDER — FENTANYL CITRATE/PF 50 MCG/ML
25 SYRINGE (ML) INJECTION
Status: DISCONTINUED | OUTPATIENT
Start: 2024-02-04 | End: 2024-02-04 | Stop reason: HOSPADM

## 2024-02-04 RX ORDER — ONDANSETRON 2 MG/ML
4 INJECTION INTRAMUSCULAR; INTRAVENOUS ONCE AS NEEDED
Status: DISCONTINUED | OUTPATIENT
Start: 2024-02-04 | End: 2024-02-04 | Stop reason: HOSPADM

## 2024-02-04 RX ORDER — LIDOCAINE HYDROCHLORIDE 10 MG/ML
INJECTION, SOLUTION EPIDURAL; INFILTRATION; INTRACAUDAL; PERINEURAL AS NEEDED
Status: DISCONTINUED | OUTPATIENT
Start: 2024-02-04 | End: 2024-02-04

## 2024-02-04 RX ORDER — ROCURONIUM BROMIDE 10 MG/ML
INJECTION, SOLUTION INTRAVENOUS AS NEEDED
Status: DISCONTINUED | OUTPATIENT
Start: 2024-02-04 | End: 2024-02-04

## 2024-02-04 RX ADMIN — FLUTICASONE FUROATE AND VILANTEROL TRIFENATATE 1 PUFF: 200; 25 POWDER RESPIRATORY (INHALATION) at 08:18

## 2024-02-04 RX ADMIN — METHOCARBAMOL 250 MG: 500 TABLET ORAL at 21:46

## 2024-02-04 RX ADMIN — ROCURONIUM BROMIDE 30 MG: 10 INJECTION, SOLUTION INTRAVENOUS at 11:14

## 2024-02-04 RX ADMIN — LIDOCAINE HYDROCHLORIDE 50 MG: 10 INJECTION, SOLUTION EPIDURAL; INFILTRATION; INTRACAUDAL; PERINEURAL at 11:14

## 2024-02-04 RX ADMIN — DOCUSATE SODIUM 100 MG: 100 CAPSULE, LIQUID FILLED ORAL at 17:11

## 2024-02-04 RX ADMIN — ONDANSETRON 4 MG: 2 INJECTION INTRAMUSCULAR; INTRAVENOUS at 12:42

## 2024-02-04 RX ADMIN — OXYCODONE HYDROCHLORIDE 5 MG: 5 TABLET ORAL at 08:17

## 2024-02-04 RX ADMIN — SODIUM CHLORIDE, SODIUM LACTATE, POTASSIUM CHLORIDE, AND CALCIUM CHLORIDE: .6; .31; .03; .02 INJECTION, SOLUTION INTRAVENOUS at 11:10

## 2024-02-04 RX ADMIN — METHOCARBAMOL 250 MG: 500 TABLET ORAL at 14:30

## 2024-02-04 RX ADMIN — ASPIRIN 81 MG: 81 TABLET, COATED ORAL at 08:17

## 2024-02-04 RX ADMIN — ATORVASTATIN CALCIUM 10 MG: 10 TABLET, FILM COATED ORAL at 17:11

## 2024-02-04 RX ADMIN — SENNOSIDES 8.6 MG: 8.6 TABLET, FILM COATED ORAL at 21:46

## 2024-02-04 RX ADMIN — AMILORIDE HYDROCLORIDE 5 MG: 5 TABLET ORAL at 14:31

## 2024-02-04 RX ADMIN — FENTANYL CITRATE 50 MCG: 50 INJECTION INTRAMUSCULAR; INTRAVENOUS at 11:14

## 2024-02-04 RX ADMIN — INSULIN LISPRO 1 UNITS: 100 INJECTION, SOLUTION INTRAVENOUS; SUBCUTANEOUS at 08:18

## 2024-02-04 RX ADMIN — PROPOFOL 120 MG: 10 INJECTION, EMULSION INTRAVENOUS at 11:14

## 2024-02-04 RX ADMIN — ACETAMINOPHEN 975 MG: 325 TABLET, FILM COATED ORAL at 14:31

## 2024-02-04 RX ADMIN — PREGABALIN 200 MG: 100 CAPSULE ORAL at 08:17

## 2024-02-04 RX ADMIN — ACETAMINOPHEN 975 MG: 325 TABLET, FILM COATED ORAL at 04:41

## 2024-02-04 RX ADMIN — PANTOPRAZOLE SODIUM 40 MG: 40 TABLET, DELAYED RELEASE ORAL at 08:17

## 2024-02-04 RX ADMIN — INSULIN LISPRO 5 UNITS: 100 INJECTION, SOLUTION INTRAVENOUS; SUBCUTANEOUS at 17:11

## 2024-02-04 RX ADMIN — NICOTINE 1 PATCH: 21 PATCH, EXTENDED RELEASE TRANSDERMAL at 08:17

## 2024-02-04 RX ADMIN — SUGAMMADEX 200 MG: 100 INJECTION, SOLUTION INTRAVENOUS at 12:42

## 2024-02-04 RX ADMIN — PREGABALIN 200 MG: 100 CAPSULE ORAL at 17:11

## 2024-02-04 RX ADMIN — CEFAZOLIN 1000 MG: 1 INJECTION, POWDER, FOR SOLUTION INTRAMUSCULAR; INTRAVENOUS at 11:26

## 2024-02-04 RX ADMIN — METHOCARBAMOL 250 MG: 500 TABLET ORAL at 04:41

## 2024-02-04 RX ADMIN — DOCUSATE SODIUM 100 MG: 100 CAPSULE, LIQUID FILLED ORAL at 08:17

## 2024-02-04 RX ADMIN — BUPROPION HYDROCHLORIDE 300 MG: 150 TABLET, FILM COATED, EXTENDED RELEASE ORAL at 08:17

## 2024-02-04 RX ADMIN — ACETAMINOPHEN 975 MG: 325 TABLET, FILM COATED ORAL at 21:46

## 2024-02-04 RX ADMIN — CLONAZEPAM 1 MG: 1 TABLET ORAL at 17:11

## 2024-02-04 RX ADMIN — OXYBUTYNIN CHLORIDE 5 MG: 5 TABLET, EXTENDED RELEASE ORAL at 08:17

## 2024-02-04 RX ADMIN — OMEGA-3 FATTY ACIDS CAP 1000 MG 1000 MG: 1000 CAP at 08:17

## 2024-02-04 RX ADMIN — FENTANYL CITRATE 50 MCG: 50 INJECTION INTRAMUSCULAR; INTRAVENOUS at 11:31

## 2024-02-04 RX ADMIN — PREGABALIN 200 MG: 100 CAPSULE ORAL at 21:45

## 2024-02-04 NOTE — TREATMENT PLAN
Patient with hematoma noted at R axillary exposure incision site. Will plan for OR today for washout and hematoma evacuation. Risks, benefits, and alternatives discussed with the patient at length who agrees to proceed.    Narinder Street, DO  Surgery, PGY-4

## 2024-02-04 NOTE — OP NOTE
DATE OF PROCEDURE: 02/04/24    PERFORMING SERVICE: Vascular and Endovascular Surgery    ATTENDING SURGEON: Seth Hoyos MD    PREOPERATIVE DIAGNOSIS: Right postoperative hematoma/seroma    POSTOPERATIVE DIAGNOSIS: Same    PROCEDURE NAME:   Incision and drainage of mixed hematoma/seroma of right axillary dissection operative site.      ANESTHESIA: General    EBL: 0 cc of new bleeding, 50 cc old hematoma/seroma    UOP:  0cc    IVF:  50 cc for this portion    SPECIMENS:  None, no sign of infection     COMPLICATIONS: None    DRAINS: None    INDICATION:  78 year old female with a recent right axillofemoral bypass with PTFE for right lower extremity tissue loss.  She has a right incisional swelling consistent with post operative hematoma or seroma.  To avoid accumulation of medium for infection, we discussed evacuation of fluid with the family.       INTRAOPERATIVE FINDINGS: Old hematoma/seroma.  No active bleeding    ASSISTING SURGEON: Dr. Shaq Mendosa DO, Dr. Narinder Street DO    DESCRIPTION OF PROCEDURE:   The patient was transported to the operative suite where a timeout was performed confirming the patient, procedure and laterality.  Preoperative antibiotics were administered.  The patient was prepped and draped in usual sterile surgical fashion.  A second timeout was again performed.  Anesthesia was initiated.      The right chest incision was reopened.  After disruption of previously placed suture lines, a minimally pressurized dark clear fluid was encountered which is consistent with reabsorbing hematoma/seroma.  No purulence or infectious fluid was encountered.  The patient has no systemic signs of infection at this time.  The soft tissues were explored and hemostasis was identified.  There is no reaccumulation of fluid during observation.  The wound was irrigated with Aricept fluid.  The deep space was closed with interrupted Vicryl suture.  The skin was closed with interrupted nylon suture.   A sterile dressing was applied.    All counts were correct at the end of the case. The patient tolerated the procedure well, awoke from anesthesia uneventfully and was transported to the PACU in good condition.    Seth Hoyos MD  02/04/24  12:26 PM

## 2024-02-04 NOTE — QUICK NOTE
Asked to assess the patient by medicine due to swelling at subclavian incision site and right breast ecchymosis.    The right subclavian incision does have some swelling in the area.  The right breast is dark purple in the dependent region from pooling of blood.  Patient is hemodynamically stable at this time. Likely seroma which will resorb over time. As such, would defer any further imaging.

## 2024-02-04 NOTE — PROGRESS NOTES
"Progress Note - Vascular Surgery     Assessment:     78 y.o. with PMH HTN, HLD, COPD, DM, Right Ankle ORIF presenting with AIOD, Right CLTI c exposed, necrotic heel wound.     Vascular surgery consulted for AIOD, Right CLTI c exposed necrotic heel wound.      1/31: Right Ax to Fem PTFE bypass     Plan:  - Doing well post-op; viable, well perfused limb with palpable pulses  - Ongoing discussions with podiatry, plastic surgery regarding limb salvage-OR today with PODS   - Poss staged BKA  - OOB as tolerated  - Diet: NPO for surgery today  - Abx None  - VTEppx: SQH  - Cont Aspirin, Statin  - Will plan for Xarelto 2.5mg BID after surgical course-on hold d/t hematoma/seroma at surgical site   - Further recommendations pending clinical course      Subjective:     Pt s/e. R subclavian incision still slightly swollen.  Patient states it is sore.     Tolerating diet. OOB. Voiding.       Pain controlled. Admits to pain at surgical site.      No new complaints. Denies n/v, sob, chest pain, f/c. No sensory change, numbness, or weakness of lower extremities    Vitals:  /63   Pulse 73   Temp 97.8 °F (36.6 °C)   Resp 18   Ht 5' 1\" (1.549 m)   Wt 66.1 kg (145 lb 11.6 oz)   SpO2 96%   BMI 27.53 kg/m²     I/Os:  I/O last 3 completed shifts:  In: 1950 [P.O.:1950]  Out: 1950 [Urine:1950]  I/O this shift:  In: -   Out: 800 [Urine:800]    Lab Results and Cultures:   Lab Results   Component Value Date    WBC 9.04 02/04/2024    HGB 10.9 (L) 02/04/2024    HCT 33.2 (L) 02/04/2024    MCV 94 02/04/2024     02/04/2024     Lab Results   Component Value Date    GLUCOSE 143 (H) 01/31/2024    CALCIUM 9.5 02/04/2024     05/21/2018    K 4.1 02/04/2024    CO2 29 02/04/2024    CL 99 02/04/2024    BUN 12 02/04/2024    CREATININE 0.50 (L) 02/04/2024     Lab Results   Component Value Date    INR 1.03 02/04/2024    INR 1.11 02/03/2024    INR 1.03 01/23/2024    PROTIME 13.4 02/04/2024    PROTIME 14.2 02/03/2024    PROTIME 13.6 " 01/23/2024        Blood Culture:   Lab Results   Component Value Date    BLOODCX No Growth After 5 Days. 01/23/2024   ,   Urinalysis:   Lab Results   Component Value Date    COLORU Yellow 11/20/2023    COLORU YELLOW 05/21/2018    CLARITYU Clear 11/20/2023    CLARITYU CLEAR 05/21/2018    SPECGRAV 1.010 11/20/2023    SPECGRAV 1.015 05/21/2018    PHUR 5.0 11/20/2023    PHUR 6.0 05/21/2018    LEUKOCYTESUR Negative 11/20/2023    LEUKOCYTESUR NEGATIVE 05/21/2018    NITRITE Negative 11/20/2023    NITRITE NEGATIVE 05/21/2018    PROTEINUA NEGATIVE 05/21/2018    GLUCOSEU Negative 11/20/2023    GLUCOSEU NEGATIVE 05/21/2018    KETONESU Negative 11/20/2023    KETONESU NEGATIVE 05/21/2018    BILIRUBINUR Negative 11/20/2023    BILIRUBINUR NEGATIVE 05/21/2018    BLOODU Negative 11/20/2023    BLOODU NEGATIVE 05/21/2018   ,   Urine Culture:   Lab Results   Component Value Date    URINECX No Growth <1000 cfu/mL 11/20/2023   ,   Wound Culure:   Lab Results   Component Value Date    WOUNDCULT 1+ Growth of 01/23/2024       Medications:  Current Facility-Administered Medications   Medication Dose Route Frequency    acetaminophen (TYLENOL) tablet 975 mg  975 mg Oral Q8H JILLIAN    albuterol (PROVENTIL HFA,VENTOLIN HFA) inhaler 2 puff  2 puff Inhalation Q6H PRN    AMILoride tablet 5 mg  5 mg Oral Daily    aspirin (ECOTRIN LOW STRENGTH) EC tablet 81 mg  81 mg Oral Daily    atorvastatin (LIPITOR) tablet 10 mg  10 mg Oral Daily With Dinner    buPROPion (WELLBUTRIN XL) 24 hr tablet 300 mg  300 mg Oral Daily    clonazePAM (KlonoPIN) tablet 1 mg  1 mg Oral QPM    docusate sodium (COLACE) capsule 100 mg  100 mg Oral BID    fish oil capsule 1,000 mg  1,000 mg Oral Daily    fluticasone-vilanterol 200-25 mcg/actuation 1 puff  1 puff Inhalation Daily    HYDROmorphone HCl (DILAUDID) injection 0.2 mg  0.2 mg Intravenous Q4H PRN    insulin lispro (HumaLOG) 100 units/mL subcutaneous injection 1-5 Units  1-5 Units Subcutaneous TID AC    insulin lispro  (HumaLOG) 100 units/mL subcutaneous injection 1-5 Units  1-5 Units Subcutaneous HS    insulin lispro (HumaLOG) 100 units/mL subcutaneous injection 5 Units  5 Units Subcutaneous TID With Meals    methocarbamol (ROBAXIN) tablet 250 mg  250 mg Oral Q8H JILLIAN    nicotine (NICODERM CQ) 21 mg/24 hr TD 24 hr patch 1 patch  1 patch Transdermal Daily    ondansetron (ZOFRAN) injection 4 mg  4 mg Intravenous Q6H PRN    oxybutynin (DITROPAN-XL) 24 hr tablet 5 mg  5 mg Oral Daily    oxyCODONE (ROXICODONE) split tablet 2.5 mg  2.5 mg Oral Q4H PRN    Or    oxyCODONE (ROXICODONE) IR tablet 5 mg  5 mg Oral Q4H PRN    pantoprazole (PROTONIX) EC tablet 40 mg  40 mg Oral Early Morning    polyethylene glycol (MIRALAX) packet 17 g  17 g Oral Daily PRN    pregabalin (LYRICA) capsule 200 mg  200 mg Oral TID    senna (SENOKOT) tablet 8.6 mg  1 tablet Oral HS     Physical Exam  Vitals and nursing note reviewed.   Constitutional:       General: She is not in acute distress.     Appearance: She is well-developed. She is not diaphoretic.   HENT:      Head: Normocephalic and atraumatic.   Eyes:      Conjunctiva/sclera: Conjunctivae normal.   Cardiovascular:      Rate and Rhythm: Normal rate and regular rhythm.      Heart sounds: No murmur heard.     Comments: RUE Palp brachial, radial pulse      LLE non-palp fem  RLE palp  Pop, dop DP/PT  Pulmonary:      Effort: Pulmonary effort is normal. No respiratory distress.      Breath sounds: Normal breath sounds.   Abdominal:      Palpations: Abdomen is soft.      Tenderness: There is no abdominal tenderness.   Musculoskeletal:         General: Signs of injury present. No swelling.      Cervical back: Neck supple.      Comments: Right heel exposed achilles tendon, no erythema, no drainage      Right chest wall incision with surrounding fluid collection.  Compressible.  Superior portion firmer.  Decreased in size since evaluation last night.  Right groin soft, no hematoma, no ecchymosis    Skin:      General: Skin is warm and dry.      Capillary Refill: Capillary refill takes less than 2 seconds.   Neurological:      General: No focal deficit present.      Mental Status: She is alert and oriented to person, place, and time. Mental status is at baseline.   Psychiatric:         Mood and Affect: Mood normal.     Patricia Loza PA-C  2/4/2024

## 2024-02-04 NOTE — ASSESSMENT & PLAN NOTE
Tx to [facility] for vascular eval/ongoing podiatry eval   Will need bypass w/ vascular before podiatry takes to OR   Podiatry and vascular d/w pt and family, patient sp bypass with vascular, now pending podiatry intervention today  Cardio provided clearance for any procedures needed  RLE NWB  ID eval at [facility] and rec to watch off abx  Lovenox on hold in setting of hematoma patient will have this evacuated by vascular surgery

## 2024-02-04 NOTE — OP NOTE
OPERATIVE REPORT - Podiatry  PATIENT NAME: Lona Cedeno    :  1945  MRN: [id number]  Pt Location: [facility] OR ROOM 07    SURGERY DATE: 2024    Surgeons and Role:  Panel 1:     * Aaron Montero DPM - Primary     * Osiris Riley DPM - Assisting  Panel 2:     * Seth Hoyos MD - Primary    Pre-op Diagnosis:  Diabetic foot ulcer with osteomyelitis (HCC) [E11.621, E11.69, L97.509, M86.9]  Achilles tendon injury, right, initial encounter [S86.001A]    Post-Op Diagnosis Codes:     * Diabetic foot ulcer with osteomyelitis (HCC) [E11.621, E11.69, L97.509, M86.9]     * Achilles tendon injury, right, initial encounter [S86.001A]    Procedure(s) (LRB):  RIGHT WOUND AND ACHILLES DEBRIDEMENT FOOT/TOE (WASH OUT); PARTIAL AMPUTATION DISTAL 2ND TOE (Right)  EVACUATION/ DRAINAGE HEMATOMA (Right)    Specimen(s):  ID Type Source Tests Collected by Time Destination   1 : right distal 2nd toe Tissue Toe, Right TISSUE EXAM Aaron Montero DPM 2024 1144        Estimated Blood Loss:   Minimal    Drains:  Ureteral Internal Stent Left ureter 6 Fr. (Active)   Number of days: 76       Anesthesia Type:   General    Hemostasis:  Direct compression  Atraumatic technique    Materials:  * No implants in log *  3-0 Nylon    Injectables:  None    Operative Findings:  - Debridement of achilles tendon to healthy underlying tissue, adequate amount of tendon remaining to suggest minimal loss of function  - Wound vac applied to posterior ankle wound  - Debridement of medial ankle wound to healthy underlying tissue  - We believe to have obtained osseous surgical cure of right second toe osteomyelitis.   - No purulence or sinus tract appreciated  - Healthy bleeding to tissue margins  - Primary closure of right 2nd toe    Complications:   None    Procedure and Technique:     Under mild sedation, the patient was brought into the operating room and placed on the operating room table in the supine position. IV sedation was achieved by anesthesia  team and a universal timeout was performed where all parties are in agreement of correct patient, correct procedure and correct site. The right lower leg and foot was then prepped and draped in the usual aseptic manner.     Debridement - right achilles tendon  Wound located at posterior ankle was excisionally debrided with a #15 blade of all nonviable, devitalized, necrotic, fibrotic, and hyperkeratotic tissue w/ excision of skin, fascia, subcutaneous tissue, soft tissue, tendon to depth of  tendon. Pre debridement wound measured 5.5cm x 3cm x 0.4cm and Post debridement wound measurements 6.0cm x 4.0cm x 0.4 cm, with appearance of wound fresh bleeding tissue, viable, granular, brisk pinpoint bleeding with viable 100%.  24 sq cm debrided. The wound was irrigated with copious amounts of normal sterile saline. A wound vac dressing was applied with a single sponge fit to the size of the wound and secured with adhesive    Wound VAC application  1. Number of sponges: 1  2. Pressure settin continuous  3. Size of wound: 6 x 4 x 0.4 cm  4. Description of wound: granular, viable, fresh bleeding tissue      Debridement - right medial ankle wound  Wound located at right medial ankle was excisionally debrided with #15 blade of all nonviable, devitalized, necrotic, fibrotic, and hyperkeratotic tissue w/ excision of skin, subcutaneous tissue, to depth of subcutaneous tissue. Pre debridement wound measured 2.3 cm x 2.8cm x 0.2cm and Post debridement wound measurements 2.5cm x 3.0cm x 0.2 cm, with appearance of wound fresh bleeding tissue, viable, granular, brisk pinpoint bleeding with viable 100%, <7sq cm debrided. The wound was irrigated with copious amounts of normal sterile saline. It was covered with adaptic.    Right second toe amputation  Attention was directed to the right second toe where a fishmouth type of incision was made. Utilizing a sterile 15 blade, this incision was carried deep straight to bone. Soft tissue  "structures were then reflected off the distal phalanx. A bone cutter was then utilized to make a transverse cut in the middle phalanx. The severed digit was then passed off to the back table.  It was noted that all tissue margins were bleeding and viable. No deep sinus tracts or areas of purulence were visualized. The remaining bone on the proximal aspect of the cut was noted to be of hard and viable quality. Any remaining tendinous structures were identified and severed proximally to remove any nidus of infection. The surgical site was irrigated with copious amounts of normal sterile saline. Skin edges were reapproximated and closure was obtained utilizing 3-0 nylon.    The right lower extremity was then cleansed and dried.  The surgical site was dressed with gauze over the second toe amputation and medial foot wound and an ABD over the wound vac. This was then covered with a kerlex and an ACE wrap.     The patient tolerated the procedure and anesthesia well without immediate complications and transferred to PACU with vital signs stable.     As with many limb salvage procedures, we contemplate the possibility of performing further stages to this procedure. Procedures may include debridements, delayed closure, plastic surgery techniques, or more proximal amputations. This procedure may be considered part of a multi-staged limb salvage treatment plan.     The patient is to remain nonweightbearing to the right lower extremity. Plan for next vac change on 2/6/24    Dr. Montero was present during the entire procedure and participated in all key aspects.    SIGNATURE: Osiris Riley DPM  DATE: February 4, 2024  TIME: 12:11 PM      Portions of the record may have been created with voice recognition software. Occasional wrong word or \"sound a like\" substitutions may have occurred due to the inherent limitations of voice recognition software. Read the chart carefully and recognize, using context, where substitutions have " occurred.

## 2024-02-04 NOTE — PROGRESS NOTES
"Podiatry - Progress Note  Patient: Lona Cedeno 78 y.o. female   MRN: [id number]  PCP: Vivek Preston DO  Unit/Bed#: [facility] 503-01 Encounter: [id number]  Date Of Visit: 24    ASSESSMENT:    Lona Cedeno is a 78 y.o. female with:    Right posterior ankle ulcer with exposed achilles tendon  Right medial ankle ulcer, limited to breakdown of skin  Osteomyelitis of right second toe  T2DM  PAD  Tobacco use disorder  Diabetic polyneuropathy    PLAN:    Patient to go to OR today,24, for right achilles tendon debridement, possible graft and possible wound vac application with Dr. Montero.  Consent to be signed with surgeon prior to procedure.  Confirmed NPO status.  H&P, vitals, and current labs reviewed.  No acute changes noted.  Alternatives, risks, and complications discussed with patient.  All questions answered.  No guarantees given of outcome.  Appreciate medical clearance from internal medicine to proceed to the OR  Rest of medical care per primary team.       SUBJECTIVE:     The patient was seen, evaluated, and assessed at bedside today. The patient was awake, alert, and in no acute distress. Patient confirmed NPO status. All questions and concerns regarding the surgical procedure addressed. Patient understands risks vs benefits of procedure and remains amenable with plan for surgery today. Patient denies N/V/F/chills/SOB/CP.      OBJECTIVE:     Vitals:   /63   Pulse 73   Temp 97.8 °F (36.6 °C)   Resp 18   Ht 5' 1\" (1.549 m)   Wt 66.1 kg (145 lb 11.6 oz)   SpO2 96%   BMI 27.53 kg/m²     Temp (24hrs), Av.8 °F (36.6 °C), Min:97.6 °F (36.4 °C), Max:98.1 °F (36.7 °C)      Physical Exam:     General:  Alert, cooperative, and in no distress.  Lower extremity exam:  Cardiovascular status at baseline.  Neurological status at baseline.  Musculoskeletal status at baseline. No calf tenderness noted bilaterally.     Dressing left intact to the Operating Room.     Additional Data:     Labs:    Results " "from last 7 days   Lab Units 02/04/24  0440   WBC Thousand/uL 9.04   HEMOGLOBIN g/dL 10.9*   HEMATOCRIT % 33.2*   PLATELETS Thousands/uL 247   NEUTROS PCT % 56   LYMPHS PCT % 26   MONOS PCT % 7   EOS PCT % 11*     Results from last 7 days   Lab Units 02/04/24  0440 02/01/24  0509 01/31/24  1558   POTASSIUM mmol/L 4.1   < >  --    CHLORIDE mmol/L 99   < >  --    CO2 mmol/L 29   < >  --    CO2, I-STAT mmol/L  --   --  27   BUN mg/dL 12   < >  --    CREATININE mg/dL 0.50*   < >  --    CALCIUM mg/dL 9.5   < >  --    ALK PHOS U/L 48  --   --    ALT U/L 4*  --   --    AST U/L 11*  --   --    GLUCOSE, ISTAT mg/dl  --   --  143*    < > = values in this interval not displayed.     Results from last 7 days   Lab Units 02/04/24  0440   INR  1.03       * I Have Reviewed All Lab Data Listed Above.    Recent Cultures (last 7 days):               Imaging: I have personally reviewed pertinent films in PACS  EKG, Pathology, and Other Studies: I have personally reviewed pertinent reports.    ** Please Note: Portions of the record may have been created with voice recognition software. Occasional wrong word or \"sound a like\" substitutions may have occurred due to the inherent limitations of voice recognition software. Read the chart carefully and recognize, using context, where substitutions have occurred. **      "

## 2024-02-04 NOTE — ANESTHESIA POSTPROCEDURE EVALUATION
Post-Op Assessment Note    CV Status:  Stable  Pain Score: 0    Pain management: adequate       Mental Status:  Alert and awake   Hydration Status:  Euvolemic   PONV Controlled:  Controlled   Airway Patency:  Patent     Post Op Vitals Reviewed: Yes    No anethesia notable event occurred.    Staff: CRNA               BP   145/76   Temp   97.1   Pulse  73   Resp   18   SpO2   96

## 2024-02-04 NOTE — ASSESSMENT & PLAN NOTE
ASA and Lipitor  LDL 80  Vascular consulted for potential bypass surgery with wounds as above   Cardio at [facility] cleared for bypass

## 2024-02-04 NOTE — ASSESSMENT & PLAN NOTE
Lab Results   Component Value Date    HGBA1C 6.5 (H) 11/07/2023       Recent Labs     02/03/24  1613 02/03/24 2036 02/04/24  0611 02/04/24  1306   POCGLU 117 126 192* 119         Blood Sugar Average: Last 72 hrs:  (P) [id number]  C/w Humalog AC  C/w ISS

## 2024-02-04 NOTE — PROGRESS NOTES
[facility]  Progress Note  Name: Lona Cedeno I  MRN: [id number]  Unit/Bed#: [facility] 503-01 I Date of Admission: 1/26/2024   Date of Service: 2/4/2024 I Hospital Day: 9    Assessment/Plan   Constipation  Assessment & Plan  Bowel regimen   Reports she had BM 1/29     Tobacco use disorder  Assessment & Plan  Nicotine TD    COPD, severity to be determined (HCC)  Assessment & Plan  Patient currently stable on room air    PAD (peripheral artery disease) (HCC)  Assessment & Plan  ASA and Lipitor  LDL 80  Vascular consulted for potential bypass surgery with wounds as above   Cardio at [facility] cleared for bypass    Anxiety and depression  Assessment & Plan  Will continue on bupropion, patient with worsening reactive depression in the setting of possible limb loss    Type 2 diabetes mellitus with diabetic polyneuropathy ([facility])  Assessment & Plan  Lab Results   Component Value Date    HGBA1C 6.5 (H) 11/07/2023       Recent Labs     02/03/24  1613 02/03/24  2036 02/04/24  0611 02/04/24  1306   POCGLU 117 126 192* 119         Blood Sugar Average: Last 72 hrs:  (P) [id number]  C/w Humalog AC  C/w ISS    * Diabetic foot ulcer with osteomyelitis ([facility])  Assessment & Plan  Tx to [facility] for vascular eval/ongoing podiatry eval   Will need bypass w/ vascular before podiatry takes to OR   Podiatry and vascular d/w pt and family, patient sp bypass with vascular, now pending podiatry intervention today  Cardio provided clearance for any procedures needed  RLE NWB  ID eval at [facility] and rec to watch off abx  Lovenox on hold in setting of hematoma patient will have this evacuated by vascular surgery               VTE Pharmacologic Prophylaxis: VTE Score: 4 Low Risk (Score 0-2) - Encourage Ambulation.    Mobility:   Basic Mobility Inpatient Raw Score: 15  JH-HLM Goal: 4: Move to chair/commode  JH-HLM Achieved: 4: Move to chair/commode  HLM Goal achieved. Continue to encourage appropriate  mobility.    Patient Centered Rounds: I performed bedside rounds with nursing staff today.   Discussions with Specialists or Other Care Team Provider:     Education and Discussions with Family / Patient: Patient declined call to .     Total Time Spent on Date of Encounter in care of patient:  mins. This time was spent on one or more of the following: performing physical exam; counseling and coordination of care; obtaining or reviewing history; documenting in the medical record; reviewing/ordering tests, medications or procedures; communicating with other healthcare professionals and discussing with patient's family/caregivers.    Current Length of Stay: 9 day(s)  Current Patient Status: Inpatient   Certification Statement: The patient will continue to require additional inpatient hospital stay due to pending vascular and podaitry procedure  Discharge Plan: Anticipate discharge in >72 hrs to discharge location to be determined pending rehab evaluations.    Code Status: Level 1 - Full Code    Subjective:   Patient denies any acute complaints today    Objective:     Vitals:   Temp (24hrs), Av.6 °F (36.4 °C), Min:97.2 °F (36.2 °C), Max:98 °F (36.7 °C)    Temp:  [97.2 °F (36.2 °C)-98 °F (36.7 °C)] 98 °F (36.7 °C)  HR:  [70-85] 70  Resp:  [12-19] 14  BP: (113-172)/(56-97) 115/60  SpO2:  [86 %-99 %] 98 %  Body mass index is 27.53 kg/m².     Input and Output Summary (last 24 hours):     Intake/Output Summary (Last 24 hours) at 2024 1502  Last data filed at 2024 1430  Gross per 24 hour   Intake 1550 ml   Output 1600 ml   Net -50 ml       Physical Exam:   Physical Exam  Vitals and nursing note reviewed.   Constitutional:       General: She is not in acute distress.     Appearance: She is well-developed. She is not toxic-appearing or diaphoretic.   HENT:      Head: Normocephalic and atraumatic.   Eyes:      General: No scleral icterus.     Conjunctiva/sclera: Conjunctivae normal.   Cardiovascular:       Rate and Rhythm: Normal rate and regular rhythm.      Heart sounds: No murmur heard.     No friction rub. No gallop.   Pulmonary:      Effort: Pulmonary effort is normal. No respiratory distress.      Breath sounds: Normal breath sounds. No stridor. No wheezing, rhonchi or rales.   Chest:      Chest wall: No tenderness.   Abdominal:      General: There is no distension.      Palpations: Abdomen is soft. There is no mass.      Tenderness: There is no abdominal tenderness. There is no guarding or rebound.      Hernia: No hernia is present.   Musculoskeletal:         General: No swelling or tenderness.      Cervical back: Neck supple.      Comments: Lower extremity wound clean dry intact  Hematoma over subclavian site, ecchymosis and bruising right breast   Skin:     General: Skin is warm and dry.      Capillary Refill: Capillary refill takes less than 2 seconds.   Neurological:      Mental Status: She is alert and oriented to person, place, and time.   Psychiatric:         Mood and Affect: Mood normal.          Additional Data:     Labs:  Results from last 7 days   Lab Units 02/04/24  0440   WBC Thousand/uL 9.04   HEMOGLOBIN g/dL 10.9*   HEMATOCRIT % 33.2*   PLATELETS Thousands/uL 247   NEUTROS PCT % 56   LYMPHS PCT % 26   MONOS PCT % 7   EOS PCT % 11*     Results from last 7 days   Lab Units 02/04/24  0440   SODIUM mmol/L 134*   POTASSIUM mmol/L 4.1   CHLORIDE mmol/L 99   CO2 mmol/L 29   BUN mg/dL 12   CREATININE mg/dL 0.50*   ANION GAP mmol/L 6   CALCIUM mg/dL 9.5   ALBUMIN g/dL 3.1*   TOTAL BILIRUBIN mg/dL 0.49   ALK PHOS U/L 48   ALT U/L 4*   AST U/L 11*   GLUCOSE RANDOM mg/dL 144*     Results from last 7 days   Lab Units 02/04/24  0440   INR  1.03     Results from last 7 days   Lab Units 02/04/24  1306 02/04/24  0611 02/03/24  2036 02/03/24  1613 02/03/24  1033 02/03/24  0559 02/02/24  2059 02/02/24  1547 02/02/24  1050 02/02/24  0616 02/01/24  2102 02/01/24  1607   POC GLUCOSE mg/dl 119 192* 126 117 164* 146*  141* 121 215* 192* 162* 103         Results from last 7 days   Lab Units 02/03/24  0557   PROCALCITONIN ng/ml 0.06       Lines/Drains:  Invasive Devices       Peripheral Intravenous Line  Duration             Peripheral IV 02/02/24 Right;Upper;Ventral (anterior) Arm 2 days    Peripheral IV 02/04/24 Left Arm <1 day              Drain  Duration             Ureteral Internal Stent Left ureter 6 Fr. 76 days                          Imaging: No pertinent imaging reviewed.    Recent Cultures (last 7 days):         Last 24 Hours Medication List:   Current Facility-Administered Medications   Medication Dose Route Frequency Provider Last Rate    acetaminophen  975 mg Oral Q8H JILLIAN Narinder P Allsbrook, DO      albuterol  2 puff Inhalation Q6H PRN Narinder P Allsbrook, DO      AMILoride  5 mg Oral Daily Narinder P Allsbrook, DO      aspirin  81 mg Oral Daily Narinder P Allsbrook, DO      atorvastatin  10 mg Oral Daily With Dinner Narinder P Zoltansbrook, DO      buPROPion  300 mg Oral Daily Narinder P Allsbrook, DO      clonazePAM  1 mg Oral QPM Narinder P Allsbrook, DO      docusate sodium  100 mg Oral BID Narinder P Allsbrook, DO      fish oil  1,000 mg Oral Daily Narinder P Allsbrook, DO      fluticasone-vilanterol  1 puff Inhalation Daily Narinder P Zoltansbrook, DO      HYDROmorphone  0.2 mg Intravenous Q4H PRN Narinder P Allsbrook, DO      insulin lispro  1-5 Units Subcutaneous TID AC Narinder P Allsbrook, DO      insulin lispro  1-5 Units Subcutaneous HS Narinder P Allsbrook, DO      insulin lispro  5 Units Subcutaneous TID With Meals Narinder P Allsbrook, DO      methocarbamol  250 mg Oral Q8H JILLIAN Narinder P Allsbrook, DO      nicotine  1 patch Transdermal Daily Narinder P Allsbrook, DO      ondansetron  4 mg Intravenous Q6H PRN Narinder P Allsbrook, DO      oxybutynin  5 mg Oral Daily Narinder P Allsbrook, DO      oxyCODONE  2.5 mg Oral Q4H PRN Narinder P Allsbrook, DO      Or    oxyCODONE  5 mg Oral Q4H PRN Narinder P Allsbrook, DO       pantoprazole  40 mg Oral Early Morning Narinder P DO Jad      polyethylene glycol  17 g Oral Daily PRN Narinder P DO Jad      pregabalin  200 mg Oral TID Narinder P DO Jad      senna  1 tablet Oral HS Narinder P DO Jad          Today, Patient Was Seen By: Ming Buchanan DO    **Please Note: This note may have been constructed using a voice recognition system.**

## 2024-02-04 NOTE — ANESTHESIA PREPROCEDURE EVALUATION
Procedure:  RIGHT WOUND AND ACHILLES DEBRIDEMENT FOOT/TOE (WASH OUT) (Right: Foot)    Relevant Problems   CARDIO   (+) Essential hypertension      ENDO   (+) Type 2 diabetes mellitus with diabetic polyneuropathy (HCC)      GI/HEPATIC   (+) GERD without esophagitis      MUSCULOSKELETAL   (+) Degenerative lumbar disc      NEURO/PSYCH   (+) Anxiety and depression      PULMONARY   (+) COPD, severity to be determined (HCC)        Physical Exam    Airway       Dental       Cardiovascular      Pulmonary      Other Findings  post-pubertal.      Anesthesia Plan  ASA Score- 2 Emergent    Anesthesia Type- IV sedation with anesthesia with ASA Monitors.         Additional Monitors:     Airway Plan: LMA.           Plan Factors-    Chart reviewed.   Existing labs reviewed.     Patient is not a current smoker. Patient not instructed to abstain from smoking on day of procedure. Patient did not smoke on day of surgery.            Induction- intravenous.    Postoperative Plan-     Informed Consent- Anesthetic plan and risks discussed with patient.  I personally reviewed this patient with the CRNA. Discussed and agreed on the Anesthesia Plan with the CRNA..

## 2024-02-04 NOTE — PLAN OF CARE
Problem: Prexisting or High Potential for Compromised Skin Integrity  Goal: Skin integrity is maintained or improved  Description: INTERVENTIONS:  - Identify patients at risk for skin breakdown  - Assess and monitor skin integrity  - Assess and monitor nutrition and hydration status  - Monitor labs   - Assess for incontinence   - Turn and reposition patient  - Assist with mobility/ambulation  - Relieve pressure over bony prominences  - Avoid friction and shearing  - Provide appropriate hygiene as needed including keeping skin clean and dry  - Evaluate need for skin moisturizer/barrier cream  - Collaborate with interdisciplinary team   - Patient/family teaching  - Consider wound care consult   Outcome: Progressing     Problem: PAIN - ADULT  Goal: Verbalizes/displays adequate comfort level or baseline comfort level  Description: Interventions:  - Encourage patient to monitor pain and request assistance  - Assess pain using appropriate pain scale  - Administer analgesics based on type and severity of pain and evaluate response  - Implement non-pharmacological measures as appropriate and evaluate response  - Consider cultural and social influences on pain and pain management  - Notify physician/advanced practitioner if interventions unsuccessful or patient reports new pain  Outcome: Progressing     Problem: INFECTION - ADULT  Goal: Absence or prevention of progression during hospitalization  Description: INTERVENTIONS:  - Assess and monitor for signs and symptoms of infection  - Monitor lab/diagnostic results  - Monitor all insertion sites, i.e. indwelling lines, tubes, and drains  - Monitor endotracheal if appropriate and nasal secretions for changes in amount and color  - [place] appropriate cooling/warming therapies per order  - Administer medications as ordered  - Instruct and encourage patient and family to use good hand hygiene technique  - Identify and instruct in appropriate isolation precautions for  identified infection/condition  Outcome: Progressing  Goal: Absence of fever/infection during neutropenic period  Description: INTERVENTIONS:  - Monitor WBC    Outcome: Progressing     Problem: SAFETY ADULT  Goal: Patient will remain free of falls  Description: INTERVENTIONS:  - Educate patient/family on patient safety including physical limitations  - Instruct patient to call for assistance with activity   - Consult OT/PT to assist with strengthening/mobility   - Keep Call bell within reach  - Keep bed low and locked with side rails adjusted as appropriate  - Keep care items and personal belongings within reach  - Initiate and maintain comfort rounds  - Make Fall Risk Sign visible to staff  - Offer Toileting every  Hours, in advance of need  - Initiate/Maintain alarm  - Obtain necessary fall risk management equipment:   - Apply yellow socks and bracelet for high fall risk patients  - Consider moving patient to room near nurses station  Outcome: Progressing  Goal: Maintain or return to baseline ADL function  Description: INTERVENTIONS:  -  Assess patient's ability to carry out ADLs; assess patient's baseline for ADL function and identify physical deficits which impact ability to perform ADLs (bathing, care of mouth/teeth, toileting, grooming, dressing, etc.)  - Assess/evaluate cause of self-care deficits   - Assess range of motion  - Assess patient's mobility; develop plan if impaired  - Assess patient's need for assistive devices and provide as appropriate  - Encourage maximum independence but intervene and supervise when necessary  - Involve family in performance of ADLs  - Assess for home care needs following discharge   - Consider OT consult to assist with ADL evaluation and planning for discharge  - Provide patient education as appropriate  Outcome: Progressing  Goal: Maintains/Returns to pre admission functional level  Description: INTERVENTIONS:  - Perform AM-PAC 6 Click Basic Mobility/ Daily Activity  assessment daily.  - Set and communicate daily mobility goal to care team and patient/family/caregiver.   - Collaborate with rehabilitation services on mobility goals if consulted  - Perform Range of Motion  times a day.  - Reposition patient every  hours.  - Dangle patient  times a day  - Stand patient  times a day  - Ambulate patient  times a day  - Out of bed to chair  times a day   - Out of bed for meals  times a day  - Out of bed for toileting  - Record patient progress and toleration of activity level   Outcome: Progressing     Problem: DISCHARGE PLANNING  Goal: Discharge to home or other facility with appropriate resources  Description: INTERVENTIONS:  - Identify barriers to discharge w/patient and caregiver  - Arrange for needed discharge resources and transportation as appropriate  - Identify discharge learning needs (meds, wound care, etc.)  - Arrange for interpretive services to assist at discharge as needed  - Refer to Case Management Department for coordinating discharge planning if the patient needs post-hospital services based on physician/advanced practitioner order or complex needs related to functional status, cognitive ability, or social support system  Outcome: Progressing     Problem: Knowledge Deficit  Goal: Patient/family/caregiver demonstrates understanding of disease process, treatment plan, medications, and discharge instructions  Description: Complete learning assessment and assess knowledge base.  Interventions:  - Provide teaching at level of understanding  - Provide teaching via preferred learning methods  Outcome: Progressing     Problem: SKIN/TISSUE INTEGRITY - ADULT  Goal: Skin Integrity remains intact(Skin Breakdown Prevention)  Description: Assess:  -Perform Brooks assessment every   -Clean and moisturize skin every   -Inspect skin when repositioning, toileting, and assisting with ADLS  -Assess under medical devices such as  every   -Assess extremities for adequate circulation and  sensation     Bed Management:  -Have minimal linens on bed & keep smooth, unwrinkled  -Change linens as needed when moist or perspiring  -Avoid sitting or lying in one position for more than  hours while in bed  -Keep HOB at degrees     Toileting:  -Offer bedside commode  -Assess for incontinence every   -Use incontinent care products after each incontinent episode such as     Activity:  -Mobilize patient  times a day  -Encourage activity and walks on unit  -Encourage or provide ROM exercises   -Turn and reposition patient every  Hours  -Use appropriate equipment to lift or move patient in bed  -Instruct/ Assist with weight shifting every  when out of bed in chair  -Consider limitation of chair time  hour intervals    Skin Care:  -Avoid use of baby powder, tape, friction and shearing, hot water or constrictive clothing  -Relieve pressure over bony prominences using   -Do not massage red bony areas    Next Steps:  -Teach patient strategies to minimize risks such as    -Consider consults to  interdisciplinary teams such as   Outcome: Progressing  Goal: Incision(s), wounds(s) or drain site(s) healing without S/S of infection  Description: INTERVENTIONS  - Assess and document dressing, incision, wound bed, drain sites and surrounding tissue  - Provide patient and family education  - Perform skin care/dressing changes every   Outcome: Progressing

## 2024-02-05 LAB
ALBUMIN SERPL BCP-MCNC: 3 G/DL (ref 3.5–5)
ALP SERPL-CCNC: 46 U/L (ref 34–104)
ALT SERPL W P-5'-P-CCNC: 3 U/L (ref 7–52)
ANION GAP SERPL CALCULATED.3IONS-SCNC: 9 MMOL/L
AST SERPL W P-5'-P-CCNC: 11 U/L (ref 13–39)
BASOPHILS # BLD AUTO: 0.03 THOUSANDS/ÂΜL (ref 0–0.1)
BASOPHILS NFR BLD AUTO: 0 % (ref 0–1)
BILIRUB SERPL-MCNC: 0.51 MG/DL (ref 0.2–1)
BUN SERPL-MCNC: 13 MG/DL (ref 5–25)
CALCIUM ALBUM COR SERPL-MCNC: 10.1 MG/DL (ref 8.3–10.1)
CALCIUM SERPL-MCNC: 9.3 MG/DL (ref 8.4–10.2)
CHLORIDE SERPL-SCNC: 100 MMOL/L (ref 96–108)
CO2 SERPL-SCNC: 28 MMOL/L (ref 21–32)
CREAT SERPL-MCNC: 0.51 MG/DL (ref 0.6–1.3)
EOSINOPHIL # BLD AUTO: 0.97 THOUSAND/ÂΜL (ref 0–0.61)
EOSINOPHIL NFR BLD AUTO: 10 % (ref 0–6)
ERYTHROCYTE [DISTWIDTH] IN BLOOD BY AUTOMATED COUNT: 13.3 % (ref 11.6–15.1)
GFR SERPL CREATININE-BSD FRML MDRD: 93 ML/MIN/1.73SQ M
GLUCOSE SERPL-MCNC: 127 MG/DL (ref 65–140)
GLUCOSE SERPL-MCNC: 150 MG/DL (ref 65–140)
GLUCOSE SERPL-MCNC: 153 MG/DL (ref 65–140)
GLUCOSE SERPL-MCNC: 159 MG/DL (ref 65–140)
GLUCOSE SERPL-MCNC: 228 MG/DL (ref 65–140)
HCT VFR BLD AUTO: 30.9 % (ref 34.8–46.1)
HGB BLD-MCNC: 9.8 G/DL (ref 11.5–15.4)
IMM GRANULOCYTES # BLD AUTO: 0.02 THOUSAND/UL (ref 0–0.2)
IMM GRANULOCYTES NFR BLD AUTO: 0 % (ref 0–2)
LYMPHOCYTES # BLD AUTO: 2.24 THOUSANDS/ÂΜL (ref 0.6–4.47)
LYMPHOCYTES NFR BLD AUTO: 24 % (ref 14–44)
MCH RBC QN AUTO: 30.6 PG (ref 26.8–34.3)
MCHC RBC AUTO-ENTMCNC: 31.7 G/DL (ref 31.4–37.4)
MCV RBC AUTO: 97 FL (ref 82–98)
MONOCYTES # BLD AUTO: 0.57 THOUSAND/ÂΜL (ref 0.17–1.22)
MONOCYTES NFR BLD AUTO: 6 % (ref 4–12)
NEUTROPHILS # BLD AUTO: 5.55 THOUSANDS/ÂΜL (ref 1.85–7.62)
NEUTS SEG NFR BLD AUTO: 60 % (ref 43–75)
NRBC BLD AUTO-RTO: 0 /100 WBCS
PLATELET # BLD AUTO: 263 THOUSANDS/UL (ref 149–390)
PMV BLD AUTO: 9.8 FL (ref 8.9–12.7)
POTASSIUM SERPL-SCNC: 4.3 MMOL/L (ref 3.5–5.3)
PROT SERPL-MCNC: 5.6 G/DL (ref 6.4–8.4)
RBC # BLD AUTO: 3.2 MILLION/UL (ref 3.81–5.12)
SODIUM SERPL-SCNC: 137 MMOL/L (ref 135–147)
WBC # BLD AUTO: 9.38 THOUSAND/UL (ref 4.31–10.16)

## 2024-02-05 PROCEDURE — 82948 REAGENT STRIP/BLOOD GLUCOSE: CPT

## 2024-02-05 PROCEDURE — 97535 SELF CARE MNGMENT TRAINING: CPT

## 2024-02-05 PROCEDURE — 99232 SBSQ HOSP IP/OBS MODERATE 35: CPT | Performed by: INTERNAL MEDICINE

## 2024-02-05 PROCEDURE — NC001 PR NO CHARGE: Performed by: PODIATRIST

## 2024-02-05 PROCEDURE — 85025 COMPLETE CBC W/AUTO DIFF WBC: CPT | Performed by: INTERNAL MEDICINE

## 2024-02-05 PROCEDURE — 80053 COMPREHEN METABOLIC PANEL: CPT | Performed by: INTERNAL MEDICINE

## 2024-02-05 PROCEDURE — 99024 POSTOP FOLLOW-UP VISIT: CPT | Performed by: SURGERY

## 2024-02-05 RX ADMIN — DOCUSATE SODIUM 100 MG: 100 CAPSULE, LIQUID FILLED ORAL at 08:44

## 2024-02-05 RX ADMIN — SENNOSIDES 8.6 MG: 8.6 TABLET, FILM COATED ORAL at 21:18

## 2024-02-05 RX ADMIN — METHOCARBAMOL 250 MG: 500 TABLET ORAL at 14:50

## 2024-02-05 RX ADMIN — PREGABALIN 200 MG: 100 CAPSULE ORAL at 21:18

## 2024-02-05 RX ADMIN — OXYCODONE HYDROCHLORIDE 5 MG: 5 TABLET ORAL at 08:10

## 2024-02-05 RX ADMIN — CLONAZEPAM 1 MG: 1 TABLET ORAL at 17:30

## 2024-02-05 RX ADMIN — PANTOPRAZOLE SODIUM 40 MG: 40 TABLET, DELAYED RELEASE ORAL at 08:44

## 2024-02-05 RX ADMIN — OXYBUTYNIN CHLORIDE 5 MG: 5 TABLET, EXTENDED RELEASE ORAL at 08:43

## 2024-02-05 RX ADMIN — ACETAMINOPHEN 975 MG: 325 TABLET, FILM COATED ORAL at 05:32

## 2024-02-05 RX ADMIN — AMILORIDE HYDROCLORIDE 5 MG: 5 TABLET ORAL at 10:52

## 2024-02-05 RX ADMIN — INSULIN LISPRO 5 UNITS: 100 INJECTION, SOLUTION INTRAVENOUS; SUBCUTANEOUS at 08:48

## 2024-02-05 RX ADMIN — METHOCARBAMOL 250 MG: 500 TABLET ORAL at 05:32

## 2024-02-05 RX ADMIN — METHOCARBAMOL 250 MG: 500 TABLET ORAL at 21:18

## 2024-02-05 RX ADMIN — INSULIN LISPRO 5 UNITS: 100 INJECTION, SOLUTION INTRAVENOUS; SUBCUTANEOUS at 17:30

## 2024-02-05 RX ADMIN — INSULIN LISPRO 5 UNITS: 100 INJECTION, SOLUTION INTRAVENOUS; SUBCUTANEOUS at 12:13

## 2024-02-05 RX ADMIN — INSULIN LISPRO 1 UNITS: 100 INJECTION, SOLUTION INTRAVENOUS; SUBCUTANEOUS at 21:18

## 2024-02-05 RX ADMIN — INSULIN LISPRO 2 UNITS: 100 INJECTION, SOLUTION INTRAVENOUS; SUBCUTANEOUS at 08:47

## 2024-02-05 RX ADMIN — OMEGA-3 FATTY ACIDS CAP 1000 MG 1000 MG: 1000 CAP at 08:44

## 2024-02-05 RX ADMIN — NICOTINE 1 PATCH: 21 PATCH, EXTENDED RELEASE TRANSDERMAL at 08:43

## 2024-02-05 RX ADMIN — PREGABALIN 200 MG: 100 CAPSULE ORAL at 08:44

## 2024-02-05 RX ADMIN — PREGABALIN 200 MG: 100 CAPSULE ORAL at 17:30

## 2024-02-05 RX ADMIN — ATORVASTATIN CALCIUM 10 MG: 10 TABLET, FILM COATED ORAL at 17:30

## 2024-02-05 RX ADMIN — BUPROPION HYDROCHLORIDE 300 MG: 150 TABLET, FILM COATED, EXTENDED RELEASE ORAL at 08:43

## 2024-02-05 RX ADMIN — ASPIRIN 81 MG: 81 TABLET, COATED ORAL at 08:51

## 2024-02-05 RX ADMIN — ACETAMINOPHEN 975 MG: 325 TABLET, FILM COATED ORAL at 21:18

## 2024-02-05 RX ADMIN — DOCUSATE SODIUM 100 MG: 100 CAPSULE, LIQUID FILLED ORAL at 17:30

## 2024-02-05 RX ADMIN — FLUTICASONE FUROATE AND VILANTEROL TRIFENATATE 1 PUFF: 200; 25 POWDER RESPIRATORY (INHALATION) at 10:27

## 2024-02-05 RX ADMIN — ACETAMINOPHEN 975 MG: 325 TABLET, FILM COATED ORAL at 14:50

## 2024-02-05 RX ADMIN — INSULIN LISPRO 1 UNITS: 100 INJECTION, SOLUTION INTRAVENOUS; SUBCUTANEOUS at 12:13

## 2024-02-05 NOTE — CASE MANAGEMENT
Case Management Discharge Planning Note    Patient name Lona Cedeno  Location [facility] 503/[facility] 503-01 MRN [id number]  : 1945 Date 2024       Current Admission Date: 2024  Current Admission Diagnosis:Diabetic foot ulcer with osteomyelitis (HCC)   Patient Active Problem List    Diagnosis Date Noted    Constipation 2024    Preoperative cardiovascular examination 2024    Diabetic foot ulcer with osteomyelitis (HCC) 2024    Ankle ulcer, right, with fat layer exposed (HCC) 2023    Age-related osteoporosis without current pathological fracture 2023    Abnormal CT of the chest 10/17/2023    COPD, severity to be determined ([facility]) 10/17/2023    Tobacco use disorder 10/17/2023    PAD (peripheral artery disease) ([facility]) 10/10/2023    Bladder neoplasm 2023    Left renal mass 2023    Lactic acidosis 2023    Hypomagnesemia 2023    Degenerative lumbar disc 2023    Urinary incontinence 2023    GERD without esophagitis 10/16/2019    Medicare annual wellness visit, subsequent 2019    Essential hypertension 02/15/2019    Anxiety and depression 02/15/2019    Type 2 diabetes mellitus with diabetic polyneuropathy (HCC) 2019      LOS (days): 10  Geometric Mean LOS (GMLOS) (days): 3.8  Days to GMLOS:-5.9     OBJECTIVE:  Risk of Unplanned Readmission Score: 17.93         Current admission status: Inpatient   Preferred Pharmacy:   [id number] [address] [address]  Phone: [phone] Fax: [phone]    [facility] [place] - [address]  [address]  [place] [address]  Phone: [phone] Fax: [phone]    Primary Care Provider: Vivek Preston DO    Primary Insurance: [facility] REP  Secondary Insurance:     DISCHARGE DETAILS:    Other Referral/Resources/Interventions Provided:  Referral Comments: CM met w/ Pt to provide provider choice list and  discuss rehab. Pt states that she will review her options and discuss w/ her family. CM will continue to follow.

## 2024-02-05 NOTE — ASSESSMENT & PLAN NOTE
Tx to [facility] for vascular eval/ongoing podiatry eval   Will need bypass w/ vascular before podiatry takes to OR   Podiatry and vascular d/w pt and family, patient sp bypass with vascular,  Cardio provided clearance for any procedures needed  RLE NWB  ID eval at [facility] and rec to watch off abx  Lovenox on hold in setting of hematoma patient will have this evacuated by vascular surgery-status post evacuation but now with reaccumulation however not as tense as prior and no surrounding changes

## 2024-02-05 NOTE — PROGRESS NOTES
"Podiatry - Progress Note  Patient: Lona Cedeno 78 y.o. female   MRN: [id number]  PCP: Vivek Preston DO  Unit/Bed#: [facility] 503-01 Encounter: [id number]  Date Of Visit: 24    ASSESSMENT:    Lona Cedeno is a 78 y.o. 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  Will plan to change wound VAC and incision dressings tomorrow 2024  Per plastics patient is not candidate for free muscle flap at this time and would benefit from STSG for skin graft substitute.  Will discuss with plastic surgery after dressing change tomorrow.  Podiatry to do all dressing changes  Elevation and offloading on green foam wedges or pillows when non-ambulatory.  Rest of care per primary team.     Weightbearing status: Non-weightbearing right foot    SUBJECTIVE:     The patient was seen, evaluated, and assessed at bedside today. The patient was awake, alert, and in no acute distress. No acute events overnight. The patient reports pain to the right lower extremity. Patient denies N/V/F/chills/SOB/CP.      OBJECTIVE:     Vitals:   /52   Pulse 81   Temp 97.9 °F (36.6 °C)   Resp 18   Ht 5' 1\" (1.549 m)   Wt 66.1 kg (145 lb 11.6 oz)   SpO2 92%   BMI 27.53 kg/m²     Temp (24hrs), Av.6 °F (36.4 °C), Min:97.2 °F (36.2 °C), Max:98 °F (36.7 °C)      Physical Exam:     Lungs: Non labored breathing  Abdomen: Soft, non-tender.  Lower Extremity:  Cardiovascular status at baseline from admission.  Neurological status at baseline from admission.  Musculoskeletal status at baseline from admission. No calf tenderness noted.     RLE: Dressings noted to the foot are intact with no strikethrough noted.  Wound VAC is running a " "continuous 125 section with minimal drainage noted within the container.  Patient reports some minor calf pain.    Additional Data:     Labs:    Results from last 7 days   Lab Units 02/05/24  0531   WBC Thousand/uL 9.38   HEMOGLOBIN g/dL 9.8*   HEMATOCRIT % 30.9*   PLATELETS Thousands/uL 263   NEUTROS PCT % 60   LYMPHS PCT % 24   MONOS PCT % 6   EOS PCT % 10*     Results from last 7 days   Lab Units 02/05/24  0531 02/01/24  0509 01/31/24  1558   POTASSIUM mmol/L 4.3   < >  --    CHLORIDE mmol/L 100   < >  --    CO2 mmol/L 28   < >  --    CO2, I-STAT mmol/L  --   --  27   BUN mg/dL 13   < >  --    CREATININE mg/dL 0.51*   < >  --    CALCIUM mg/dL 9.3   < >  --    ALK PHOS U/L 46   < >  --    ALT U/L 3*   < >  --    AST U/L 11*   < >  --    GLUCOSE, ISTAT mg/dl  --   --  143*    < > = values in this interval not displayed.     Results from last 7 days   Lab Units 02/04/24  0440   INR  1.03       * I Have Reviewed All Lab Data Listed Above.    Recent Cultures (last 7 days):               Imaging: I have personally reviewed pertinent films in PACS  EKG, Pathology, and Other Studies: I have personally reviewed pertinent reports.    ** Please Note: Portions of the record may have been created with voice recognition software. Occasional wrong word or \"sound a like\" substitutions may have occurred due to the inherent limitations of voice recognition software. Read the chart carefully and recognize, using context, where substitutions have occurred. **      "

## 2024-02-05 NOTE — ASSESSMENT & PLAN NOTE
ASA and Lipitor  LDL 80  Vascular consulted for potential bypass surgery with wounds as above   Cardio at [facility] cleared for bypass

## 2024-02-05 NOTE — OCCUPATIONAL THERAPY NOTE
Occupational Therapy Progress Note     Patient Name: Lona Cedeno  Today's Date: 2/5/2024  Problem List  Principal Problem:    Diabetic foot ulcer with osteomyelitis (HCC)  Active Problems:    Type 2 diabetes mellitus with diabetic polyneuropathy (HCC)    Anxiety and depression    PAD (peripheral artery disease) (HCC)    COPD, severity to be determined (HCC)    Tobacco use disorder    Constipation          02/05/24 1010   OT Last Visit   OT Visit Date 02/05/24   Note Type   Note Type Treatment   Pain Assessment   Pain Assessment Tool 0-10   Pain Score No Pain   Restrictions/Precautions   Weight Bearing Precautions Per Order Yes   RLE Weight Bearing Per Order (S)  NWB   Other Precautions O2;Fall Risk;Pain;WBS;Chair Alarm;Bed Alarm;Multiple lines  (wound vac x2)   ADL   Where Assessed Edge of bed   Grooming Assistance 4  Minimal Assistance   Grooming Deficit Teeth care   Grooming Comments requires A for set up and vc for thorough completion.   LB Dressing Assistance 3  Moderate Assistance   LB Dressing Deficit Don/doff R sock;Don/doff L sock   LB Dressing Comments pt able to manipulate socks once donned.   Bed Mobility   Supine to Sit 4  Minimal assistance   Additional items Assist x 1;Increased time required   Additional Comments found in bed, left in chair w all needs in reach and alarm on.   Transfers   Sit to Stand 4  Minimal assistance   Additional items Assist x 1;Increased time required;Verbal cues   Stand to Sit 4  Minimal assistance   Additional items Assist x 1;Increased time required;Verbal cues   Additional Comments rw   Functional Mobility   Functional Mobility 4  Minimal assistance   Additional Comments ax1, small steps EOB>chair.   Additional items Rolling walker   Cognition   Overall Cognitive Status WFL   Arousal/Participation Responsive;Cooperative   Attention Within functional limits   Orientation Level Oriented X4   Memory Decreased recall of precautions   Following Commands Follows one step  commands without difficulty   Comments pt pleasant, overall fair safety awareness and insight to condition t/o session.   Activity Tolerance   Activity Tolerance Patient limited by fatigue   Medical Staff Made Aware RN   Assessment   Assessment Pt seen on this date for OT session focusing on ADL retraining, body mechanics, transfer retraining, increasing activity tolerance/endurance and EOB sitting to increase ability to participate in ADL/functional tasks. Pt was found in bed and was left in chair w/ all needs within reach, chair alarm on. Pt completed bed mob w min Ax1 c rw, sat eob while completing grooming tasks, which were completed w min A. STS and FM w min Ax1 c rw for support, vc for hand placement and inc overall safety awareness. Pt w/ improvements in transfer ability and adherence to wbs, however is still limited 2* decreased ADL/High-level ADL status, decreased activity tolerance/endurance, decreased cognition, decreased self-care trans, decreased safety awareness and insight to condition. The patient's raw score on the AM-PAC Daily Activity Inpatient Short Form is 16. A raw score of less than 19 suggests the patient may benefit from discharge to post-acute rehabilitation services. Please refer to the recommendation of the Occupational Therapist for safe discharge planning. Recommending pt D/C to STR when medically stable. Pt will continue to benefit from acute OT services to meet goals.   Plan   Treatment Interventions ADL retraining;Functional transfer training;Endurance training;Activityengagement;Energy conservation   Goal Expiration Date 02/12/24   OT Treatment Day 3   OT Frequency 2-3x/wk   Discharge Recommendation   Rehab Resource Intensity Level, OT I (Maximum Resource Intensity)   AM-PAC Daily Activity Inpatient   Lower Body Dressing 2   Bathing 2   Toileting 2   Upper Body Dressing 3   Grooming 3   Eating 4   Daily Activity Raw Score 16   Daily Activity Standardized Score (Calc for Raw Score  >=11) 35.96   AM-PAC Applied Cognition Inpatient   Following a Speech/Presentation 4   Understanding Ordinary Conversation 4   Taking Medications 4   Remembering Where Things Are Placed or Put Away 4   Remembering List of 4-5 Errands 4   Taking Care of Complicated Tasks 4   Applied Cognition Raw Score 24   Applied Cognition Standardized Score 62.21   Modified Prague Scale   Modified Prague Scale 4   End of Consult   Education Provided Yes   Patient Position at End of Consult Bedside chair;All needs within reach;Bed/Chair alarm activated         MINDY Wooten, OTR/L

## 2024-02-05 NOTE — QUICK NOTE
"Post-Op Check - Vascular Surgery   Lona Cedeno 78 y.o. female MRN: [id number]  Unit/Bed#: [facility] 503-01 Encounter: [id number]    ASSESMENT:  78yoF s/p R axillary incision site exploration, hematoma evacuation    PLAN:  - Incentive spirometry  - Diet as tolerated  - PRN analgesia  - I/Os  - SCDs for VTE Prophylaxis    Subjective: Patient seen with family at the bedside, no acute complaints, denying, pain, nausea, paresthesias.     Vitals:  /64 (BP Location: Left arm)   Pulse (!) 53   Temp (!) 97.3 °F (36.3 °C) (Oral)   Resp 15   Ht 5' 1\" (1.549 m)   Wt 66.1 kg (145 lb 11.6 oz)   SpO2 96%   BMI 27.53 kg/m²     Physical Exam:  General appearance: alert and oriented, in no acute distress  Neurologic: Grossly neurologically intact to baseline  Neck: supple, symmetrical, trachea midline  Lungs:  Normal work of breathing without accessory muscle use  Heart:  Regular rate and rhythm  Abdomen:  Soft, non-distended abdomen  Extremities:  R foot with clean and dry dressing in place    Wound/Incision:  R chest with c/d/I mepilex in place without note strikethrough    I/Os:  I/O last 3 completed shifts:  In: 1850 [P.O.:1350; I.V.:500]  Out: 1700 [Urine:1700]  No intake/output data recorded.    Invasive Lines/Tubes:  Invasive Devices       Peripheral Intravenous Line  Duration             Peripheral IV 02/02/24 Right;Upper;Ventral (anterior) Arm 2 days    Peripheral IV 02/04/24 Left Arm <1 day              Drain  Duration             Ureteral Internal Stent Left ureter 6 Fr. 76 days                  VTE Prophylaxis: Sequential compression device (Venodyne)     Sandra Arenas PA-C  2/4/2024    "

## 2024-02-05 NOTE — PLAN OF CARE
Problem: Prexisting or High Potential for Compromised Skin Integrity  Goal: Skin integrity is maintained or improved  Description: INTERVENTIONS:  - Identify patients at risk for skin breakdown  - Assess and monitor skin integrity  - Assess and monitor nutrition and hydration status  - Monitor labs   - Assess for incontinence   - Turn and reposition patient  - Assist with mobility/ambulation  - Relieve pressure over bony prominences  - Avoid friction and shearing  - Provide appropriate hygiene as needed including keeping skin clean and dry  - Evaluate need for skin moisturizer/barrier cream  - Collaborate with interdisciplinary team   - Patient/family teaching  - Consider wound care consult   Outcome: Progressing     Problem: PAIN - ADULT  Goal: Verbalizes/displays adequate comfort level or baseline comfort level  Description: Interventions:  - Encourage patient to monitor pain and request assistance  - Assess pain using appropriate pain scale  - Administer analgesics based on type and severity of pain and evaluate response  - Implement non-pharmacological measures as appropriate and evaluate response  - Consider cultural and social influences on pain and pain management  - Notify physician/advanced practitioner if interventions unsuccessful or patient reports new pain  Outcome: Progressing     Problem: INFECTION - ADULT  Goal: Absence or prevention of progression during hospitalization  Description: INTERVENTIONS:  - Assess and monitor for signs and symptoms of infection  - Monitor lab/diagnostic results  - Monitor all insertion sites, i.e. indwelling lines, tubes, and drains  - Monitor endotracheal if appropriate and nasal secretions for changes in amount and color  - [place] appropriate cooling/warming therapies per order  - Administer medications as ordered  - Instruct and encourage patient and family to use good hand hygiene technique  - Identify and instruct in appropriate isolation precautions for  identified infection/condition  Outcome: Progressing  Goal: Absence of fever/infection during neutropenic period  Description: INTERVENTIONS:  - Monitor WBC    Outcome: Progressing     Problem: SAFETY ADULT  Goal: Patient will remain free of falls  Description: INTERVENTIONS:  - Educate patient/family on patient safety including physical limitations  - Instruct patient to call for assistance with activity   - Consult OT/PT to assist with strengthening/mobility   - Keep Call bell within reach  - Keep bed low and locked with side rails adjusted as appropriate  - Keep care items and personal belongings within reach  - Initiate and maintain comfort rounds  - Make Fall Risk Sign visible to staff  - Offer Toileting every 2 Hours, in advance of need  - Initiate/Maintain bed alarm  - Obtain necessary fall risk management equipment: bed alarm  - Apply yellow socks and bracelet for high fall risk patients  - Consider moving patient to room near nurses station  Outcome: Progressing  Goal: Maintain or return to baseline ADL function  Description: INTERVENTIONS:  -  Assess patient's ability to carry out ADLs; assess patient's baseline for ADL function and identify physical deficits which impact ability to perform ADLs (bathing, care of mouth/teeth, toileting, grooming, dressing, etc.)  - Assess/evaluate cause of self-care deficits   - Assess range of motion  - Assess patient's mobility; develop plan if impaired  - Assess patient's need for assistive devices and provide as appropriate  - Encourage maximum independence but intervene and supervise when necessary  - Involve family in performance of ADLs  - Assess for home care needs following discharge   - Consider OT consult to assist with ADL evaluation and planning for discharge  - Provide patient education as appropriate  Outcome: Progressing  Goal: Maintains/Returns to pre admission functional level  Description: INTERVENTIONS:  - Perform AM-PAC 6 Click Basic Mobility/ Daily  Activity assessment daily.  - Set and communicate daily mobility goal to care team and patient/family/caregiver.   - Collaborate with rehabilitation services on mobility goals if consulted  - Perform Range of Motion 3 times a day.  - Reposition patient every 2 hours.  - Dangle patient 3 times a day  - Stand patient 3 times a day  - Ambulate patient 3 times a day  - Out of bed to chair 3 times a day   - Out of bed for meals 3 times a day  - Out of bed for toileting  - Record patient progress and toleration of activity level   Outcome: Progressing     Problem: DISCHARGE PLANNING  Goal: Discharge to home or other facility with appropriate resources  Description: INTERVENTIONS:  - Identify barriers to discharge w/patient and caregiver  - Arrange for needed discharge resources and transportation as appropriate  - Identify discharge learning needs (meds, wound care, etc.)  - Arrange for interpretive services to assist at discharge as needed  - Refer to Case Management Department for coordinating discharge planning if the patient needs post-hospital services based on physician/advanced practitioner order or complex needs related to functional status, cognitive ability, or social support system  Outcome: Progressing     Problem: Knowledge Deficit  Goal: Patient/family/caregiver demonstrates understanding of disease process, treatment plan, medications, and discharge instructions  Description: Complete learning assessment and assess knowledge base.  Interventions:  - Provide teaching at level of understanding  - Provide teaching via preferred learning methods  Outcome: Progressing     Problem: SKIN/TISSUE INTEGRITY - ADULT  Goal: Skin Integrity remains intact(Skin Breakdown Prevention)  Description: Assess:  -Perform Brooks assessment every shift  -Clean and moisturize skin every shift  -Inspect skin when repositioning, toileting, and assisting with ADLS  -Assess under medical devices such as masimo every shift  -Assess  extremities for adequate circulation and sensation     Bed Management:  -Have minimal linens on bed & keep smooth, unwrinkled  -Change linens as needed when moist or perspiring  -Avoid sitting or lying in one position for more than 2 hours while in bed  -Keep HOB at 45 degrees     Toileting:  -Offer bedside commode  -Assess for incontinence every 2 hrs  -Use incontinent care products after each incontinent episode such as incontinence cleanser    Activity:  -Mobilize patient 3 times a day  -Encourage activity and walks on unit  -Encourage or provide ROM exercises   -Turn and reposition patient every 2 Hours  -Use appropriate equipment to lift or move patient in bed  -Instruct/ Assist with weight shifting every 2 when out of bed in chair  -Consider limitation of chair time 2 hour intervals    Skin Care:  -Avoid use of baby powder, tape, friction and shearing, hot water or constrictive clothing  -Relieve pressure over bony prominences using alleyvn  -Do not massage red bony areas    Next Steps:  -Teach patient strategies to minimize risks such as repositioning every 2 hrs   -Consider consults to  interdisciplinary teams such as wound care  Outcome: Progressing  Goal: Incision(s), wounds(s) or drain site(s) healing without S/S of infection  Description: INTERVENTIONS  - Assess and document dressing, incision, wound bed, drain sites and surrounding tissue  - Provide patient and family education  - Perform skin care/dressing changes every shift  Outcome: Progressing

## 2024-02-05 NOTE — PROGRESS NOTES
Progress Note - Vascular Surgery   Lona Cedeno 78 y.o. female MRN: [id number]  Unit/Bed#: [facility] 503-01 Encounter: [id number]    Assessment:  78 y.o. with PMH HTN, HLD, COPD, DM, Right Ankle ORIF presenting with AIOD, Right CLTI c exposed, necrotic heel wound.     Vascular surgery consulted for AIOD, Right CLTI c exposed necrotic heel wound.      1/31: Right Ax to Fem PTFE bypass  2/4: Right chest wall hematoma evacuation, right foot wound and achilles debridement with 2nd toe partial amputation, VAC placement     Plan:  - Diet as tolerated  - Maintain VAC per Podiatry  - Plastic Surgery: can skin graft if sufficient granulation tissue, place dermal substitute if clean wound   - Pain and nausea control PRN  - Encourage IS, ambulation   - Continue ASA/statin  - DVT ppx     Subjective:  No acute events overnight. Denying pain at rest. Tolerating diet. Denies additional complaints.     Objective:    Vitals:   Afebrile, Normal VS on 2 L nc.  Temp:  [97.2 °F (36.2 °C)-98 °F (36.7 °C)] 97.9 °F (36.6 °C)  HR:  [53-81] 81  Resp:  [12-18] 18  BP: (110-148)/(52-76) 110/52  Body mass index is 27.53 kg/m².    Physical Exam:   Gen: NAD, Resting in bed  Neuro: A&O, No focal deficits  Head: Normal Cephalic, Atraumatic  Eye: EOMI, No scleral icterus  CV: Regular rate  Pulm: Normal work of breathing, No respiratory distress   Ext: Right axillary exposure site with swelling, mepilex in place without strikethrough, tender to palpation. RLE with dressing, VAC in place. Right groin VAC in place, tender.   Skin: Warm, Dry, Intact    I/O:  UOP 2.75 L + 1x unmeasured     Intake/Output Summary (Last 24 hours) at 2/5/2024 0805  Last data filed at 2/5/2024 0534  Gross per 24 hour   Intake 920 ml   Output 2750 ml   Net -1830 ml       Lab Results:    Recent Labs     02/04/24  0440 02/05/24  0531   WBC 9.04 9.38   HGB 10.9* 9.8*    263   SODIUM 134* 137   K 4.1 4.3   CL 99 100   CO2 29 28   BUN 12 13   CREATININE 0.50* 0.51*   GLUC 144*  150*   CALCIUM 9.5 9.3   AST 11* 11*   ALT 4* 3*   ALKPHOS 48 46   TBILI 0.49 0.51       ---    Madeline Gallardo MD  General Surgery PGY-I

## 2024-02-05 NOTE — PROGRESS NOTES
[facility]  Progress Note  Name: Lona Cedeno I  MRN: [id number]  Unit/Bed#: PPHP 503-01 I Date of Admission: 1/26/2024   Date of Service: 2/5/2024 I Hospital Day: 10    Assessment/Plan   Constipation  Assessment & Plan  Bowel regimen   Reports she had BM 1/29     Tobacco use disorder  Assessment & Plan  Nicotine TD    COPD, severity to be determined (HCC)  Assessment & Plan  Patient currently stable on room air    PAD (peripheral artery disease) (Formerly [facility])  Assessment & Plan  ASA and Lipitor  LDL 80  Vascular consulted for potential bypass surgery with wounds as above   Cardio at [facility] cleared for bypass    Anxiety and depression  Assessment & Plan  Will continue on bupropion, patient with worsening reactive depression in the setting of possible limb loss    Type 2 diabetes mellitus with diabetic polyneuropathy (Formerly [facility])  Assessment & Plan  Lab Results   Component Value Date    HGBA1C 6.5 (H) 11/07/2023       Recent Labs     02/04/24  1605 02/04/24  2100 02/05/24  0811 02/05/24  1049   POCGLU 119 140 228* 159*         Blood Sugar Average: Last 72 hrs:  (P) [id number]  C/w Humalog AC  C/w ISS    * Diabetic foot ulcer with osteomyelitis (Formerly [facility])  Assessment & Plan  Tx to [facility] for vascular eval/ongoing podiatry eval   Will need bypass w/ vascular before podiatry takes to OR   Podiatry and vascular d/w pt and family, patient sp bypass with vascular,  Cardio provided clearance for any procedures needed  RLE NWB  ID eval at [facility] and rec to watch off abx  Lovenox on hold in setting of hematoma patient will have this evacuated by vascular surgery-status post evacuation but now with reaccumulation however not as tense as prior and no surrounding changes               VTE Pharmacologic Prophylaxis: VTE Score: 4 Low Risk (Score 0-2) - Encourage Ambulation.    Mobility:   Basic Mobility Inpatient Raw Score: 16  JH-HLM Goal: 5: Stand one or more mins  JH-HLM Achieved: 2: Bed  activities/Dependent transfer  HLM Goal achieved. Continue to encourage appropriate mobility.    Patient Centered Rounds: I performed bedside rounds with nursing staff today.   Discussions with Specialists or Other Care Team Provider:     Education and Discussions with Family / Patient: Patient declined call to .     Total Time Spent on Date of Encounter in care of patient:  mins. This time was spent on one or more of the following: performing physical exam; counseling and coordination of care; obtaining or reviewing history; documenting in the medical record; reviewing/ordering tests, medications or procedures; communicating with other healthcare professionals and discussing with patient's family/caregivers.    Current Length of Stay: 10 day(s)  Current Patient Status: Inpatient   Certification Statement: The patient will continue to require additional inpatient hospital stay due to monitoring hematoma, pklastics evaluation, pdioatry  Discharge Plan: Anticipate discharge in >72 hrs to discharge location to be determined pending rehab evaluations.    Code Status: Level 1 - Full Code    Subjective:   Patient denies any acute complaints today    Objective:     Vitals:   Temp (24hrs), Av.9 °F (36.6 °C), Min:97.9 °F (36.6 °C), Max:97.9 °F (36.6 °C)    Temp:  [97.9 °F (36.6 °C)] 97.9 °F (36.6 °C)  HR:  [78-81] 81  Resp:  [16-18] 18  BP: (110-120)/(52-61) 110/52  SpO2:  [92 %] 92 %  Body mass index is 27.53 kg/m².     Input and Output Summary (last 24 hours):     Intake/Output Summary (Last 24 hours) at 2024 1533  Last data filed at 2024 1247  Gross per 24 hour   Intake 180 ml   Output 2700 ml   Net -2520 ml       Physical Exam:   Physical Exam  Vitals and nursing note reviewed.   Constitutional:       General: She is not in acute distress.     Appearance: She is well-developed. She is not toxic-appearing or diaphoretic.   HENT:      Head: Normocephalic and atraumatic.   Eyes:      General: No  scleral icterus.     Conjunctiva/sclera: Conjunctivae normal.   Cardiovascular:      Rate and Rhythm: Normal rate and regular rhythm.      Heart sounds: No murmur heard.     No friction rub. No gallop.   Pulmonary:      Effort: Pulmonary effort is normal. No respiratory distress.      Breath sounds: Normal breath sounds. No stridor. No wheezing, rhonchi or rales.   Chest:      Chest wall: No tenderness.   Abdominal:      General: There is no distension.      Palpations: Abdomen is soft. There is no mass.      Tenderness: There is no abdominal tenderness. There is no guarding or rebound.      Hernia: No hernia is present.   Musculoskeletal:         General: No swelling or tenderness.      Cervical back: Neck supple.      Comments: Lower extremity wound clean dry intact  Hematoma over subclavian site, ecchymosis and bruising right breast   Skin:     General: Skin is warm and dry.      Capillary Refill: Capillary refill takes less than 2 seconds.   Neurological:      Mental Status: She is alert and oriented to person, place, and time.   Psychiatric:         Mood and Affect: Mood normal.          Additional Data:     Labs:  Results from last 7 days   Lab Units 02/05/24  0531   WBC Thousand/uL 9.38   HEMOGLOBIN g/dL 9.8*   HEMATOCRIT % 30.9*   PLATELETS Thousands/uL 263   NEUTROS PCT % 60   LYMPHS PCT % 24   MONOS PCT % 6   EOS PCT % 10*     Results from last 7 days   Lab Units 02/05/24  0531   SODIUM mmol/L 137   POTASSIUM mmol/L 4.3   CHLORIDE mmol/L 100   CO2 mmol/L 28   BUN mg/dL 13   CREATININE mg/dL 0.51*   ANION GAP mmol/L 9   CALCIUM mg/dL 9.3   ALBUMIN g/dL 3.0*   TOTAL BILIRUBIN mg/dL 0.51   ALK PHOS U/L 46   ALT U/L 3*   AST U/L 11*   GLUCOSE RANDOM mg/dL 150*     Results from last 7 days   Lab Units 02/04/24  0440   INR  1.03     Results from last 7 days   Lab Units 02/05/24  1049 02/05/24  0811 02/04/24  2100 02/04/24  1605 02/04/24  1306 02/04/24  0611 02/03/24  2036 02/03/24  1613 02/03/24  1033  02/03/24  0559 02/02/24 2059 02/02/24  1547   POC GLUCOSE mg/dl 159* 228* 140 119 119 192* 126 117 164* 146* 141* 121         Results from last 7 days   Lab Units 02/03/24  0557   PROCALCITONIN ng/ml 0.06       Lines/Drains:  Invasive Devices       Peripheral Intravenous Line  Duration             Peripheral IV 02/02/24 Right;Upper;Ventral (anterior) Arm 3 days    Peripheral IV 02/04/24 Left Arm 1 day              Drain  Duration             Ureteral Internal Stent Left ureter 6 Fr. 77 days                          Imaging: No pertinent imaging reviewed.    Recent Cultures (last 7 days):         Last 24 Hours Medication List:   Current Facility-Administered Medications   Medication Dose Route Frequency Provider Last Rate    acetaminophen  975 mg Oral Q8H JILLIAN Narinder Street, DO      albuterol  2 puff Inhalation Q6H PRN Narinder Street, DO      AMILoride  5 mg Oral Daily Narinder Street, DO      aspirin  81 mg Oral Daily Narinder Street, DO      atorvastatin  10 mg Oral Daily With Dinner Narinder Street, DO      buPROPion  300 mg Oral Daily Narinder Street, DO      clonazePAM  1 mg Oral QPM Narinder Street, DO      docusate sodium  100 mg Oral BID Narinder Street, DO      fish oil  1,000 mg Oral Daily Narinder Street, DO      fluticasone-vilanterol  1 puff Inhalation Daily Narinder Street, DO      HYDROmorphone  0.2 mg Intravenous Q4H PRN Narinder Street, DO      insulin lispro  1-5 Units Subcutaneous TID AC Narinder Street, DO      insulin lispro  1-5 Units Subcutaneous HS Narinder Street, DO      insulin lispro  5 Units Subcutaneous TID With Meals Narinder Street, DO      methocarbamol  250 mg Oral Q8H JILLIAN Narinder Street, DO      nicotine  1 patch Transdermal Daily Narinder Street, DO      ondansetron  4 mg Intravenous Q6H PRN Narinder Street, DO      oxybutynin  5 mg Oral Daily Narinder Street, DO      oxyCODONE  2.5 mg Oral Q4H PRN Narinder  P [place]sbrook, DO      Or    oxyCODONE  5 mg Oral Q4H PRN Narinder P [place], DO      pantoprazole  40 mg Oral Early Morning Narinder P [place]brook, DO      polyethylene glycol  17 g Oral Daily PRN Narinder P [place]sbrook, DO      pregabalin  200 mg Oral TID Narinder P Allsbrook, DO      senna  1 tablet Oral HS Narinder P Allsjordan, DO          Today, Patient Was Seen By: Ming Buchanan DO    **Please Note: This note may have been constructed using a voice recognition system.**

## 2024-02-05 NOTE — PLAN OF CARE
Problem: Prexisting or High Potential for Compromised Skin Integrity  Goal: Skin integrity is maintained or improved  Description: INTERVENTIONS:  - Identify patients at risk for skin breakdown  - Assess and monitor skin integrity  - Assess and monitor nutrition and hydration status  - Monitor labs   - Assess for incontinence   - Turn and reposition patient  - Assist with mobility/ambulation  - Relieve pressure over bony prominences  - Avoid friction and shearing  - Provide appropriate hygiene as needed including keeping skin clean and dry  - Evaluate need for skin moisturizer/barrier cream  - Collaborate with interdisciplinary team   - Patient/family teaching  - Consider wound care consult   Outcome: Progressing     Problem: PAIN - ADULT  Goal: Verbalizes/displays adequate comfort level or baseline comfort level  Description: Interventions:  - Encourage patient to monitor pain and request assistance  - Assess pain using appropriate pain scale  - Administer analgesics based on type and severity of pain and evaluate response  - Implement non-pharmacological measures as appropriate and evaluate response  - Consider cultural and social influences on pain and pain management  - Notify physician/advanced practitioner if interventions unsuccessful or patient reports new pain  Outcome: Progressing     Problem: INFECTION - ADULT  Goal: Absence or prevention of progression during hospitalization  Description: INTERVENTIONS:  - Assess and monitor for signs and symptoms of infection  - Monitor lab/diagnostic results  - Monitor all insertion sites, i.e. indwelling lines, tubes, and drains  - Monitor endotracheal if appropriate and nasal secretions for changes in amount and color  - [place] appropriate cooling/warming therapies per order  - Administer medications as ordered  - Instruct and encourage patient and family to use good hand hygiene technique  - Identify and instruct in appropriate isolation precautions for  identified infection/condition  Outcome: Progressing  Goal: Absence of fever/infection during neutropenic period  Description: INTERVENTIONS:  - Monitor WBC    Outcome: Progressing     Problem: SAFETY ADULT  Goal: Patient will remain free of falls  Description: INTERVENTIONS:  - Educate patient/family on patient safety including physical limitations  - Instruct patient to call for assistance with activity   - Consult OT/PT to assist with strengthening/mobility   - Keep Call bell within reach  - Keep bed low and locked with side rails adjusted as appropriate  - Keep care items and personal belongings within reach  - Initiate and maintain comfort rounds  - Make Fall Risk Sign visible to staff  - Apply yellow socks and bracelet for high fall risk patients  - Consider moving patient to room near nurses station  Outcome: Progressing  Goal: Maintain or return to baseline ADL function  Description: INTERVENTIONS:  -  Assess patient's ability to carry out ADLs; assess patient's baseline for ADL function and identify physical deficits which impact ability to perform ADLs (bathing, care of mouth/teeth, toileting, grooming, dressing, etc.)  - Assess/evaluate cause of self-care deficits   - Assess range of motion  - Assess patient's mobility; develop plan if impaired  - Assess patient's need for assistive devices and provide as appropriate  - Encourage maximum independence but intervene and supervise when necessary  - Involve family in performance of ADLs  - Assess for home care needs following discharge   - Consider OT consult to assist with ADL evaluation and planning for discharge  - Provide patient education as appropriate  Outcome: Progressing  Goal: Maintains/Returns to pre admission functional level  Description: INTERVENTIONS:  - Perform AM-PAC 6 Click Basic Mobility/ Daily Activity assessment daily.  - Set and communicate daily mobility goal to care team and patient/family/caregiver.   - Collaborate with rehabilitation  services on mobility goals if consulted  - Out of bed for toileting  - Record patient progress and toleration of activity level   Outcome: Progressing     Problem: DISCHARGE PLANNING  Goal: Discharge to home or other facility with appropriate resources  Description: INTERVENTIONS:  - Identify barriers to discharge w/patient and caregiver  - Arrange for needed discharge resources and transportation as appropriate  - Identify discharge learning needs (meds, wound care, etc.)  - Arrange for interpretive services to assist at discharge as needed  - Refer to Case Management Department for coordinating discharge planning if the patient needs post-hospital services based on physician/advanced practitioner order or complex needs related to functional status, cognitive ability, or social support system  Outcome: Progressing     Problem: Knowledge Deficit  Goal: Patient/family/caregiver demonstrates understanding of disease process, treatment plan, medications, and discharge instructions  Description: Complete learning assessment and assess knowledge base.  Interventions:  - Provide teaching at level of understanding  - Provide teaching via preferred learning methods  Outcome: Progressing     Problem: SKIN/TISSUE INTEGRITY - ADULT  Goal: Skin Integrity remains intact(Skin Breakdown Prevention)  Description: Assess:  -Inspect skin when repositioning, toileting, and assisting with ADLS  -Assess extremities for adequate circulation and sensation     Bed Management:  -Have minimal linens on bed & keep smooth, unwrinkled  -Change linens as needed when moist or perspiring      Toileting:  -Offer bedside commode    Activity:  -Encourage activity and walks on unit  -Encourage or provide ROM exercises   -Use appropriate equipment to lift or move patient in bed      Skin Care:  -Avoid use of baby powder, tape, friction and shearing, hot water or constrictive clothing  -Do not massage red bony areas    Outcome: Progressing  Goal:  Incision(s), wounds(s) or drain site(s) healing without S/S of infection  Description: INTERVENTIONS  - Assess and document dressing, incision, wound bed, drain sites and surrounding tissue  - Provide patient and family education  Outcome: Progressing

## 2024-02-05 NOTE — PLAN OF CARE
Problem: OCCUPATIONAL THERAPY ADULT  Goal: Performs self-care activities at highest level of function for planned discharge setting.  See evaluation for individualized goals.  Description: Treatment Interventions: ADL retraining, Functional transfer training, Endurance training, UE strengthening/ROM, Patient/family training, Equipment evaluation/education, Compensatory technique education, Continued evaluation, Energy conservation, Activityengagement          See flowsheet documentation for full assessment, interventions and recommendations.   Outcome: Progressing  Note: Limitation: Decreased ADL status, Decreased Safe judgement during ADL, Decreased endurance, Decreased self-care trans, Decreased high-level ADLs  Prognosis: Good  Assessment: Pt seen on this date for OT session focusing on ADL retraining, body mechanics, transfer retraining, increasing activity tolerance/endurance and EOB sitting to increase ability to participate in ADL/functional tasks. Pt was found in bed and was left in chair w/ all needs within reach, chair alarm on. Pt completed bed mob w min Ax1 c rw, sat eob while completing grooming tasks, which were completed w min A. STS and FM w min Ax1 c rw for support, vc for hand placement and inc overall safety awareness. Pt w/ improvements in transfer ability and adherence to wbs, however is still limited 2* decreased ADL/High-level ADL status, decreased activity tolerance/endurance, decreased cognition, decreased self-care trans, decreased safety awareness and insight to condition. The patient's raw score on the AM-PAC Daily Activity Inpatient Short Form is 16. A raw score of less than 19 suggests the patient may benefit from discharge to post-acute rehabilitation services. Please refer to the recommendation of the Occupational Therapist for safe discharge planning. Recommending pt D/C to STR when medically stable. Pt will continue to benefit from acute OT services to meet goals.     Rehab  Resource Intensity Level, OT: I (Maximum Resource Intensity)

## 2024-02-05 NOTE — ASSESSMENT & PLAN NOTE
Lab Results   Component Value Date    HGBA1C 6.5 (H) 11/07/2023       Recent Labs     02/04/24  1605 02/04/24  2100 02/05/24  0811 02/05/24  1049   POCGLU 119 140 228* 159*         Blood Sugar Average: Last 72 hrs:  (P) [id number]  C/w Humalog AC  C/w ISS

## 2024-02-06 PROBLEM — D64.9 ANEMIA: Status: ACTIVE | Noted: 2024-02-06

## 2024-02-06 PROBLEM — T14.8XXA HEMATOMA: Status: ACTIVE | Noted: 2024-02-06

## 2024-02-06 LAB
ANION GAP SERPL CALCULATED.3IONS-SCNC: 9 MMOL/L
BASOPHILS # BLD AUTO: 0.05 THOUSANDS/ÂΜL (ref 0–0.1)
BASOPHILS NFR BLD AUTO: 1 % (ref 0–1)
BUN SERPL-MCNC: 11 MG/DL (ref 5–25)
CALCIUM SERPL-MCNC: 9.5 MG/DL (ref 8.4–10.2)
CHLORIDE SERPL-SCNC: 103 MMOL/L (ref 96–108)
CO2 SERPL-SCNC: 26 MMOL/L (ref 21–32)
CREAT SERPL-MCNC: 0.5 MG/DL (ref 0.6–1.3)
EOSINOPHIL # BLD AUTO: 0.99 THOUSAND/ÂΜL (ref 0–0.61)
EOSINOPHIL NFR BLD AUTO: 11 % (ref 0–6)
ERYTHROCYTE [DISTWIDTH] IN BLOOD BY AUTOMATED COUNT: 13.5 % (ref 11.6–15.1)
GFR SERPL CREATININE-BSD FRML MDRD: 93 ML/MIN/1.73SQ M
GLUCOSE SERPL-MCNC: 112 MG/DL (ref 65–140)
GLUCOSE SERPL-MCNC: 130 MG/DL (ref 65–140)
GLUCOSE SERPL-MCNC: 139 MG/DL (ref 65–140)
GLUCOSE SERPL-MCNC: 140 MG/DL (ref 65–140)
GLUCOSE SERPL-MCNC: 155 MG/DL (ref 65–140)
HCT VFR BLD AUTO: 31.7 % (ref 34.8–46.1)
HGB BLD-MCNC: 9.9 G/DL (ref 11.5–15.4)
IMM GRANULOCYTES # BLD AUTO: 0.04 THOUSAND/UL (ref 0–0.2)
IMM GRANULOCYTES NFR BLD AUTO: 0 % (ref 0–2)
LYMPHOCYTES # BLD AUTO: 2.68 THOUSANDS/ÂΜL (ref 0.6–4.47)
LYMPHOCYTES NFR BLD AUTO: 29 % (ref 14–44)
MCH RBC QN AUTO: 30.5 PG (ref 26.8–34.3)
MCHC RBC AUTO-ENTMCNC: 31.2 G/DL (ref 31.4–37.4)
MCV RBC AUTO: 98 FL (ref 82–98)
MONOCYTES # BLD AUTO: 0.65 THOUSAND/ÂΜL (ref 0.17–1.22)
MONOCYTES NFR BLD AUTO: 7 % (ref 4–12)
NEUTROPHILS # BLD AUTO: 4.8 THOUSANDS/ÂΜL (ref 1.85–7.62)
NEUTS SEG NFR BLD AUTO: 52 % (ref 43–75)
NRBC BLD AUTO-RTO: 0 /100 WBCS
PLATELET # BLD AUTO: 258 THOUSANDS/UL (ref 149–390)
PMV BLD AUTO: 9.5 FL (ref 8.9–12.7)
POTASSIUM SERPL-SCNC: 4.1 MMOL/L (ref 3.5–5.3)
RBC # BLD AUTO: 3.25 MILLION/UL (ref 3.81–5.12)
SODIUM SERPL-SCNC: 138 MMOL/L (ref 135–147)
WBC # BLD AUTO: 9.21 THOUSAND/UL (ref 4.31–10.16)

## 2024-02-06 PROCEDURE — 97605 NEG PRS WND THER DME<=50SQCM: CPT | Performed by: PODIATRIST

## 2024-02-06 PROCEDURE — 99232 SBSQ HOSP IP/OBS MODERATE 35: CPT | Performed by: STUDENT IN AN ORGANIZED HEALTH CARE EDUCATION/TRAINING PROGRAM

## 2024-02-06 PROCEDURE — 97530 THERAPEUTIC ACTIVITIES: CPT

## 2024-02-06 PROCEDURE — 82948 REAGENT STRIP/BLOOD GLUCOSE: CPT

## 2024-02-06 PROCEDURE — 85025 COMPLETE CBC W/AUTO DIFF WBC: CPT | Performed by: INTERNAL MEDICINE

## 2024-02-06 PROCEDURE — 99232 SBSQ HOSP IP/OBS MODERATE 35: CPT | Performed by: PHYSICAL MEDICINE & REHABILITATION

## 2024-02-06 PROCEDURE — 80048 BASIC METABOLIC PNL TOTAL CA: CPT | Performed by: INTERNAL MEDICINE

## 2024-02-06 PROCEDURE — 97112 NEUROMUSCULAR REEDUCATION: CPT

## 2024-02-06 PROCEDURE — 99024 POSTOP FOLLOW-UP VISIT: CPT | Performed by: SURGERY

## 2024-02-06 RX ORDER — HEPARIN SODIUM 5000 [USP'U]/ML
5000 INJECTION, SOLUTION INTRAVENOUS; SUBCUTANEOUS EVERY 8 HOURS SCHEDULED
Status: DISCONTINUED | OUTPATIENT
Start: 2024-02-06 | End: 2024-02-08

## 2024-02-06 RX ORDER — POLYETHYLENE GLYCOL 3350 17 G/17G
17 POWDER, FOR SOLUTION ORAL DAILY
Status: DISCONTINUED | OUTPATIENT
Start: 2024-02-06 | End: 2024-02-09 | Stop reason: HOSPADM

## 2024-02-06 RX ADMIN — INSULIN LISPRO 5 UNITS: 100 INJECTION, SOLUTION INTRAVENOUS; SUBCUTANEOUS at 12:15

## 2024-02-06 RX ADMIN — PANTOPRAZOLE SODIUM 40 MG: 40 TABLET, DELAYED RELEASE ORAL at 08:04

## 2024-02-06 RX ADMIN — ASPIRIN 81 MG: 81 TABLET, COATED ORAL at 08:04

## 2024-02-06 RX ADMIN — METHOCARBAMOL 250 MG: 500 TABLET ORAL at 21:19

## 2024-02-06 RX ADMIN — ACETAMINOPHEN 975 MG: 325 TABLET, FILM COATED ORAL at 13:22

## 2024-02-06 RX ADMIN — INSULIN LISPRO 5 UNITS: 100 INJECTION, SOLUTION INTRAVENOUS; SUBCUTANEOUS at 08:05

## 2024-02-06 RX ADMIN — PREGABALIN 200 MG: 100 CAPSULE ORAL at 08:04

## 2024-02-06 RX ADMIN — INSULIN LISPRO 1 UNITS: 100 INJECTION, SOLUTION INTRAVENOUS; SUBCUTANEOUS at 12:15

## 2024-02-06 RX ADMIN — INSULIN LISPRO 5 UNITS: 100 INJECTION, SOLUTION INTRAVENOUS; SUBCUTANEOUS at 17:10

## 2024-02-06 RX ADMIN — NICOTINE 1 PATCH: 21 PATCH, EXTENDED RELEASE TRANSDERMAL at 08:04

## 2024-02-06 RX ADMIN — FLUTICASONE FUROATE AND VILANTEROL TRIFENATATE 1 PUFF: 200; 25 POWDER RESPIRATORY (INHALATION) at 08:05

## 2024-02-06 RX ADMIN — HEPARIN SODIUM 5000 UNITS: 5000 INJECTION INTRAVENOUS; SUBCUTANEOUS at 16:45

## 2024-02-06 RX ADMIN — BUPROPION HYDROCHLORIDE 300 MG: 150 TABLET, FILM COATED, EXTENDED RELEASE ORAL at 08:04

## 2024-02-06 RX ADMIN — OXYBUTYNIN CHLORIDE 5 MG: 5 TABLET, EXTENDED RELEASE ORAL at 08:04

## 2024-02-06 RX ADMIN — PREGABALIN 200 MG: 100 CAPSULE ORAL at 21:18

## 2024-02-06 RX ADMIN — ACETAMINOPHEN 975 MG: 325 TABLET, FILM COATED ORAL at 05:13

## 2024-02-06 RX ADMIN — ATORVASTATIN CALCIUM 10 MG: 10 TABLET, FILM COATED ORAL at 17:08

## 2024-02-06 RX ADMIN — METHOCARBAMOL 250 MG: 500 TABLET ORAL at 05:13

## 2024-02-06 RX ADMIN — METHOCARBAMOL 250 MG: 500 TABLET ORAL at 13:22

## 2024-02-06 RX ADMIN — OMEGA-3 FATTY ACIDS CAP 1000 MG 1000 MG: 1000 CAP at 08:04

## 2024-02-06 RX ADMIN — CLONAZEPAM 1 MG: 1 TABLET ORAL at 17:08

## 2024-02-06 RX ADMIN — PREGABALIN 200 MG: 100 CAPSULE ORAL at 17:08

## 2024-02-06 RX ADMIN — SENNOSIDES 8.6 MG: 8.6 TABLET, FILM COATED ORAL at 21:19

## 2024-02-06 RX ADMIN — AMILORIDE HYDROCLORIDE 5 MG: 5 TABLET ORAL at 08:05

## 2024-02-06 RX ADMIN — ACETAMINOPHEN 975 MG: 325 TABLET, FILM COATED ORAL at 21:19

## 2024-02-06 RX ADMIN — HEPARIN SODIUM 5000 UNITS: 5000 INJECTION INTRAVENOUS; SUBCUTANEOUS at 21:18

## 2024-02-06 NOTE — PHYSICAL THERAPY NOTE
"   PT Treatment       02/06/24 0920   PT Last Visit   PT Visit Date 02/06/24   Note Type   Note Type Treatment   Pain Assessment   Pain Assessment Tool FLACC  (did not verbalize pain)   Pain Location/Orientation Orientation: Right;Location: Leg   Pain Rating: FLACC (Rest) - Face 0   Pain Rating: FLACC (Rest) - Legs 0   Pain Rating: FLACC (Rest) - Activity 0   Pain Rating: FLACC (Rest) - Cry 0   Pain Rating: FLACC (Rest) - Consolability 0   Score: FLACC (Rest) 0   Pain Rating: FLACC (Activity) - Face 0   Pain Rating: FLACC (Activity) - Legs 0   Pain Rating: FLACC (Activity) - Activity 0   Pain Rating: FLACC (Activity) - Cry 0   Pain Rating: FLACC (Activity) - Consolability 0   Score: FLACC (Activity) 0   Restrictions/Precautions   Weight Bearing Precautions Per Order Yes   RLE Weight Bearing Per Order (S)  NWB   Braces or Orthoses   (prevalon boot R LE)   Other Precautions WBS;Fall Risk;O2;Telemetry  (2 L O2, wound vac)   General   Chart Reviewed Yes   Additional Pertinent History 2/4: right posterior heel wound debridement and right second toe amputation.   Response to Previous Treatment Patient with no complaints from previous session.   Family/Caregiver Present No   Cognition   Arousal/Participation Alert;Cooperative   Attention Within functional limits   Orientation Level Oriented to person;Oriented to place;Oriented to situation   Following Commands Follows one step commands without difficulty   Subjective   Subjective \"that was easy\"- referring to slideboard transfer   Bed Mobility   Supine to Sit 5  Supervision   Additional items HOB elevated;Increased time required;LE management   Sit to Supine Unable to assess   Additional Comments patient received supine in bed sleeping, awoken easily. S for bed mobility and demonstrating fair balance sitting EOB. post treatment patient OOB in chair with b/l LE elevated, prevalon boot donned to R LE, alarm active   Transfers   Sliding Board transfer 4  Minimal assistance "   Additional items Assist x 1;Increased time required;Verbal cues   Additional Comments therapist educated/demonstrated slide board transfer. with min-AX1 patient was able to perform series of lateral scoots on slideboard from bed to drop arm chair with VC for hand positioning and to maintain R LE NWB.   Balance   Static Sitting Normal   Endurance Deficit   Endurance Deficit Yes   Endurance Deficit Description NWB R LE, 2 L O2   Activity Tolerance   Activity Tolerance Patient tolerated treatment well   Nurse Made Aware shala to see per RN Laurie   Exercises   Neuro re-ed bed mobility, slideboard transfer   Assessment   Prognosis Fair   Problem List Decreased strength;Decreased endurance;Impaired balance;Decreased mobility;Pain;Decreased skin integrity;Impaired judgement   Assessment PT initiated treatment session in order to assist patient in achieving goals to improve transfers and overall activity tolerance. Patient remains NWB to R LE and in prior treatment sessions has had difficulty maintaining compliance with WBS and so today she participated in education and performance of slide board transfer OOB with min-AX1 and able to maintain R LE NWB. Throughout treatment session patient required both verbal and tactile cuing to improve safety, efficiency, and mechanics of mobility in addition to hands on assistance for all aspects of functional mobility. Additionally, she required increased time to execute specific mobility tasks with rest breaks in between secondary to gross fatigue and weakness. PT d/c recommendation remains for rehab. Patient will continue to benefit from continued skilled PT this admission to achieve maximal function and safety.   Goals   Patient Goals to know if she is having more surgery or not   LTG Expiration Date 02/20/24  (goals extended today. not met and remain appropriate)   PT Treatment Day 3   Plan   Treatment/Interventions Functional transfer training;LE  strengthening/ROM;Elevations;Therapeutic exercise;Endurance training;Patient/family training;Equipment eval/education;Bed mobility;Gait training;OT;Spoke to nursing   Progress Progressing toward goals   PT Frequency 3-5x/wk   Discharge Recommendation   Rehab Resource Intensity Level, PT II (Moderate Resource Intensity)   Equipment Recommended Wheelchair;Walker  (slideboard)   AM-PAC Basic Mobility Inpatient   Turning in Flat Bed Without Bedrails 4   Lying on Back to Sitting on Edge of Flat Bed Without Bedrails 4   Moving Bed to Chair 3   Standing Up From Chair Using Arms 2   Walk in Room 2   Climb 3-5 Stairs With Railing 1   Basic Mobility Inpatient Raw Score 16   Basic Mobility Standardized Score 38.32   Highest Level Of Mobility   JH-HLM Goal 5: Stand one or more mins   JH-HLM Achieved 4: Move to chair/commode       The patient's AM-PAC Basic Mobility Inpatient Standardized Score is less than 42.9, suggesting this patient may benefit from discharge to post-acute rehabilitation services. Please also refer to the recommendation of the Physical Therapist for safe discharge planning.    ALEXA HERNANDEZ PT, DPT

## 2024-02-06 NOTE — ASSESSMENT & PLAN NOTE
Continue inhalers  Not in exacerbation  O2 sat 87-88% overnight, placed on 2 L nasal cannula.  Now on room air  Supplemental O2 to keep O2 sats between 88-92%

## 2024-02-06 NOTE — ASSESSMENT & PLAN NOTE
Lab Results   Component Value Date    HGBA1C 6.5 (H) 11/07/2023       Recent Labs     02/05/24  1049 02/05/24  1539 02/05/24  2107 02/06/24  0547   POCGLU 159* 127 153* 130         Blood Sugar Average: Last 72 hrs:  (P) 147.9158631087577237  Outpatient regimen metformin 1000 mg twice daily and Januvia, holding  Continue Humalog 3 times daily and insulin sliding scale  Continue Lyrica  Accu-Cheks reviewed.  Continue

## 2024-02-06 NOTE — PROGRESS NOTES
[facility]  Progress Note  Name: Lona Cedeno I  MRN: [id number]  Unit/Bed#: [facility] 503-01 I Date of Admission: 1/26/2024   Date of Service: 2/6/2024 I Hospital Day: 11    Assessment/Plan   * Diabetic foot ulcer with osteomyelitis (HCC)  Assessment & Plan  Transferred to [facility] for vascular eval/ongoing podiatry eval   Cleared by cardiology  S/p right Ax to fem bypass on 1/31  S/p right foot wound and Achilles debridement with second toe partial amputation and VAC placement   Discussed with vascular surgery and no further vascular surgery intervention planned  Reviewed plastic surgery evaluation unfortunately patient is not a candidate for free muscle flap at this time and will benefit from STSG for skin graft substitute  Monitoring off antibiotics per ID evaluation at [facility]  Podiatry following and performing wound dressing changes, pending final recommendations  Nonweightbearing on right foot    Hematoma  Assessment & Plan  Over right chest wall  S/p evacuation by vascular surgery on 2/4  Discussed with vascular surgery okay to restart DVT prophylaxis    Constipation  Assessment & Plan  On colace and senna  Last BM on 2/2  Stop colace, Start miralax and continue senna    Tobacco use disorder  Assessment & Plan  Educated on tobacco cessation  Continue nicotine patch    COPD, severity to be determined ([facility])  Assessment & Plan  Continue inhalers  Not in exacerbation  O2 sat 87-88% overnight, placed on 2 L nasal cannula.  Now on room air  Supplemental O2 to keep O2 sats between 88-92%    PAD (peripheral artery disease) ([facility])  Assessment & Plan  Continue aspirin and Lipitor  S/p bypass  PT/OT evaluation with level 2 recommendation    Anxiety and depression  Assessment & Plan  Continue bupropion and clonazepam at bedtime    Essential hypertension  Assessment & Plan  Continue amiloride  BP reviewed and stable    Type 2 diabetes mellitus with diabetic polyneuropathy ([facility])  Assessment &  Plan  Lab Results   Component Value Date    HGBA1C 6.5 (H) 11/07/2023       Recent Labs     02/05/24  1049 02/05/24  1539 02/05/24  2107 02/06/24  0547   POCGLU 159* 127 153* 130         Blood Sugar Average: Last 72 hrs:  (P) [id number]  Outpatient regimen metformin 1000 mg twice daily and Januvia, holding  Continue Humalog 3 times daily and insulin sliding scale  Continue Lyrica  Accu-Cheks reviewed.  Continue             VTE Pharmacologic Prophylaxis: VTE Score: 4 Moderate Risk (Score 3-4) - Pharmacological DVT Prophylaxis Ordered: heparin.    Mobility:   Basic Mobility Inpatient Raw Score: 16  JH-HLM Goal: 5: Stand one or more mins  JH-HLM Achieved: 4: Move to chair/commode  HLM Goal achieved. Continue to encourage appropriate mobility.    Patient Centered Rounds: I performed bedside rounds with nursing staff today.   Discussions with Specialists or Other Care Team Provider: CM    Education and Discussions with Family / Patient: Attempted to update  () via phone. Unable to contact.    Total Time Spent on Date of Encounter in care of patient: 35 mins. This time was spent on one or more of the following: performing physical exam; counseling and coordination of care; obtaining or reviewing history; documenting in the medical record; reviewing/ordering tests, medications or procedures; communicating with other healthcare professionals and discussing with patient's family/caregivers.    Current Length of Stay: 11 day(s)  Current Patient Status: Inpatient   Certification Statement: The patient will continue to require additional inpatient hospital stay due to pending final podiatry recx, possible intervention,   Discharge Plan: Anticipate discharge in 48-72 hrs to rehab facility.    Code Status: Level 1 - Full Code    Subjective:   No acute events overnight.  Patient reports feeling well this morning.  Pain is controlled with current regimen.  Tolerating oral intake.  Notes chest wall  hematoma significantly improved.    Objective:     Vitals:   Temp (24hrs), Av.3 °F (36.8 °C), Min:97.5 °F (36.4 °C), Max:98.9 °F (37.2 °C)    Temp:  [97.5 °F (36.4 °C)-98.9 °F (37.2 °C)] 97.5 °F (36.4 °C)  HR:  [71-83] 71  Resp:  [15-20] 15  BP: (111-127)/(46-68) 127/68  SpO2:  [87 %-93 %] 93 %  Body mass index is 27.53 kg/m².     Input and Output Summary (last 24 hours):     Intake/Output Summary (Last 24 hours) at 2024 0934  Last data filed at 2024 0758  Gross per 24 hour   Intake 1380 ml   Output 1850 ml   Net -470 ml       Physical Exam:   Physical Exam  Vitals and nursing note reviewed.   Constitutional:       General: She is not in acute distress.     Appearance: She is well-developed.   HENT:      Head: Normocephalic and atraumatic.   Eyes:      Conjunctiva/sclera: Conjunctivae normal.   Pulmonary:      Effort: Pulmonary effort is normal. No respiratory distress.      Breath sounds: Normal breath sounds.   Abdominal:      Palpations: Abdomen is soft.      Tenderness: There is no abdominal tenderness.   Musculoskeletal:         General: No swelling.      Cervical back: Neck supple.      Comments: Right foot covered in gauze and bandage, area appears clean with no blood or discharge   Skin:     General: Skin is warm and dry.      Comments: Right chest wall induration, covered, soft on palpation, mildly tender   Neurological:      Mental Status: She is alert.            Additional Data:     Labs:  Results from last 7 days   Lab Units 24  0458   WBC Thousand/uL 9.21   HEMOGLOBIN g/dL 9.9*   HEMATOCRIT % 31.7*   PLATELETS Thousands/uL 258   NEUTROS PCT % 52   LYMPHS PCT % 29   MONOS PCT % 7   EOS PCT % 11*     Results from last 7 days   Lab Units 24  0458 24  0531   SODIUM mmol/L 138 137   POTASSIUM mmol/L 4.1 4.3   CHLORIDE mmol/L 103 100   CO2 mmol/L 26 28   BUN mg/dL 11 13   CREATININE mg/dL 0.50* 0.51*   ANION GAP mmol/L 9 9   CALCIUM mg/dL 9.5 9.3   ALBUMIN g/dL  --  3.0*   TOTAL  BILIRUBIN mg/dL  --  0.51   ALK PHOS U/L  --  46   ALT U/L  --  3*   AST U/L  --  11*   GLUCOSE RANDOM mg/dL 140 150*     Results from last 7 days   Lab Units 02/04/24  0440   INR  1.03     Results from last 7 days   Lab Units 02/06/24  0547 02/05/24  2107 02/05/24  1539 02/05/24  1049 02/05/24  0811 02/04/24  2100 02/04/24  1605 02/04/24  1306 02/04/24  0611 02/03/24  2036 02/03/24  1613 02/03/24  1033   POC GLUCOSE mg/dl 130 153* 127 159* 228* 140 119 119 192* 126 117 164*         Results from last 7 days   Lab Units 02/03/24  0557   PROCALCITONIN ng/ml 0.06       Lines/Drains:  Invasive Devices       Peripheral Intravenous Line  Duration             Peripheral IV 02/02/24 Right;Upper;Ventral (anterior) Arm 4 days    Peripheral IV 02/04/24 Left Arm 1 day              Drain  Duration             Ureteral Internal Stent Left ureter 6 Fr. 78 days                          Imaging: No pertinent imaging reviewed.    Recent Cultures (last 7 days):         Last 24 Hours Medication List:   Current Facility-Administered Medications   Medication Dose Route Frequency Provider Last Rate    acetaminophen  975 mg Oral Q8H [facility] Narinder P Allsbrook, DO      albuterol  2 puff Inhalation Q6H PRN Narinder P Allsbrook, DO      AMILoride  5 mg Oral Daily Narinder P Allsbrook, DO      aspirin  81 mg Oral Daily Narinder P Allsbrook, DO      atorvastatin  10 mg Oral Daily With Dinner Narinder P Allsbrook, DO      buPROPion  300 mg Oral Daily Narinder P Allsbrook, DO      clonazePAM  1 mg Oral QPM Narinder P Allsbrook, DO      fish oil  1,000 mg Oral Daily Narinder P Allsbrook, DO      fluticasone-vilanterol  1 puff Inhalation Daily Narinder P Allsbrook, DO      HYDROmorphone  0.2 mg Intravenous Q4H PRN Narinder P Allsbrook, DO      insulin lispro  1-5 Units Subcutaneous TID AC Narinder P Allsbrook, DO      insulin lispro  1-5 Units Subcutaneous HS Narinder P Allsbrook, DO      insulin lispro  5 Units Subcutaneous TID With Meals Narinder P Allsbrook,  DO      methocarbamol  250 mg Oral Q8H [facility] Narinder P Zoltansjordan, DO      nicotine  1 patch Transdermal Daily Narinder P Zoltansjordan, DO      ondansetron  4 mg Intravenous Q6H PRN Narinder P Allsbrook, DO      oxybutynin  5 mg Oral Daily Narinder P Zoltansjordan, DO      oxyCODONE  2.5 mg Oral Q4H PRN Narinder P Zoltansjordan, DO      Or    oxyCODONE  5 mg Oral Q4H PRN Narinder P Zoltansjordan, DO      pantoprazole  40 mg Oral Early Morning Narinder P Jad, DO      polyethylene glycol  17 g Oral Daily Ofe Wood MD      pregabalin  200 mg Oral TID Narinder P Jad, DO      senna  1 tablet Oral HS Narinder AJ Street DO          Today, Patient Was Seen By: Ofe Wood MD    **Please Note: This note may have been constructed using a voice recognition system.**

## 2024-02-06 NOTE — PROGRESS NOTES
"[facility] Podiatry - Progress Note  Patient: Lona Cedeno 78 y.o. female   MRN: [id number]  PCP: Vivek Preston DO  [facility] Encounter: [id number]  Date Of Visit: 24    ASSESSMENT:    Lona Cedeno is a 78 y.o. female with:    Right posterior ankle ulcer with exposed achilles tendon  -Right wound debridement 2/4  Right medial ankle ulcer, limited to breakdown of skin  Osteomyelitis of right second toe  -2nd toe amputation  T2DM  PAD  Tobacco use disorder  Diabetic polyneuropathy      PLAN:    POD 2 right posterior heel and Achilles tendon wound debridement and right second toe amputation.  Wound VAC changed today and right second toe incision site dressing changed today.  Please see wound VAC note below  Right second toe amputation incision site is stable with no acute clinical signs of infection at this time  Plan to continue negative pressure wound VAC therapy at this time with next change on 2024.  Possible split-thickness skin graft with podiatry pending OR and surgeon availability.  No concrete OR plans at this time pending surgeon and or availability.  Continue local wound care consisting of wound VAC to posterior right ankle ulcer with Dermagran to the anterior ankle wound and Betadine Adaptic to the right second partial toe amputation site  Elevation on green foam wedges or pillows when non-ambulatory.  Rest of care per primary team.    Weight bearing status: Nonweightbearing right lower extremity      SUBJECTIVE:     The patient was seen, evaluated, and assessed at bedside today. The patient was awake, alert, and in no acute distress. No acute events overnight. The patient reports no pain or discomfort to right lower extremity. Patient denies N/V/F/chills/SOB/CP.      OBJECTIVE:     Vitals:   /68   Pulse 71   Temp 97.5 °F (36.4 °C)   Resp 20   Ht 5' 1\" (1.549 m)   Wt 66.1 kg (145 lb 11.6 oz)   SpO2 93%   BMI 27.53 kg/m²     Temp (24hrs), Av.2 °F (36.8 °C), " Min:97.5 °F (36.4 °C), Max:98.9 °F (37.2 °C)      Physical Exam:     General:  Alert, cooperative, and in no distress.  Lower extremity exam:  Cardiovascular status at baseline.  Neurological status at baseline.  Musculoskeletal status at baseline. No calf tenderness noted.     Lower extremity wound(s) as noted below:  Wound #: 1  Location: Right posterior ankle with exposed Achilles  Length 6.0 cm: Width 4.0 cm: Depth 0.4 cm:   Deepest Tissue Noted in Base: Tendon  Probe to Bone: No  Peripheral Skin Description: Attached  Granulation: 100% Fibrotic Tissue: 0% Necrotic Tissue: 0%   Drainage Amount: minimal, serous  Signs of Infection: No    Wound #: 2  Location: Right medial ankle  Length 2.3 cm: Width 2.8 cm: Depth 0.2 cm:   Deepest Tissue Noted in Base: Subcutaneous  Probe to Bone: No  Peripheral Skin Description: Attached  Granulation: 70% Fibrotic Tissue: 30% Necrotic Tissue: 0%   Drainage Amount: minimal, serous  Signs of Infection: No    Right lower extremity: Partial second toe amputation site is stable with intact sutures, well aligned skin edges, and capillary refill within normal limits without signs of active infection: no purulence, no malodor, no ascending erythema, no crepitus, no fluctuance.      Clinical Images 02/06/24:                Additional Data:     Labs:    Results from last 7 days   Lab Units 02/06/24  0458   WBC Thousand/uL 9.21   HEMOGLOBIN g/dL 9.9*   HEMATOCRIT % 31.7*   PLATELETS Thousands/uL 258   NEUTROS PCT % 52   LYMPHS PCT % 29   MONOS PCT % 7   EOS PCT % 11*     Results from last 7 days   Lab Units 02/06/24  0458 02/05/24  0531 02/01/24  0509 01/31/24  1558   POTASSIUM mmol/L 4.1 4.3   < >  --    CHLORIDE mmol/L 103 100   < >  --    CO2 mmol/L 26 28   < >  --    CO2, I-STAT mmol/L  --   --   --  27   BUN mg/dL 11 13   < >  --    CREATININE mg/dL 0.50* 0.51*   < >  --    CALCIUM mg/dL 9.5 9.3   < >  --    ALK PHOS U/L  --  46   < >  --    ALT U/L  --  3*   < >  --    AST U/L  --   "11*   < >  --    GLUCOSE, ISTAT mg/dl  --   --   --  143*    < > = values in this interval not displayed.     Results from last 7 days   Lab Units 02/04/24  0440   INR  1.03       * I Have Reviewed All Lab Data Listed Above.    Recent Cultures (last 7 days):               Imaging: I have personally reviewed pertinent films in PACS  EKG, Pathology, and Other Studies: I have personally reviewed pertinent reports.      ** Please Note: Portions of the record may have been created with voice recognition software. Occasional wrong word or \"sound a like\" substitutions may have occurred due to the inherent limitations of voice recognition software. Read the chart carefully and recognize, using context, where substitutions have occurred. **      "

## 2024-02-06 NOTE — PLAN OF CARE
Problem: PHYSICAL THERAPY ADULT  Goal: Performs mobility at highest level of function for planned discharge setting.  See evaluation for individualized goals.  Description: Treatment/Interventions: Functional transfer training, Therapeutic exercise, Endurance training, Gait training, Bed mobility, Equipment eval/education, Elevations  Equipment Recommended: Walker       See flowsheet documentation for full assessment, interventions and recommendations.  Outcome: Progressing  Note: Prognosis: Fair  Problem List: Decreased strength, Decreased endurance, Impaired balance, Decreased mobility, Pain, Decreased skin integrity, Impaired judgement  Assessment: PT initiated treatment session in order to assist patient in achieving goals to improve transfers and overall activity tolerance. Patient remains NWB to R LE and in prior treatment sessions has had difficulty maintaining compliance with WBS and so today she participated in education and performance of slide board transfer OOB with min-AX1 and able to maintain R LE NWB. Throughout treatment session patient required both verbal and tactile cuing to improve safety, efficiency, and mechanics of mobility in addition to hands on assistance for all aspects of functional mobility. Additionally, she required increased time to execute specific mobility tasks with rest breaks in between secondary to gross fatigue and weakness. PT d/c recommendation remains for rehab. Patient will continue to benefit from continued skilled PT this admission to achieve maximal function and safety.  Barriers to Discharge: Inaccessible home environment, Decreased caregiver support     Rehab Resource Intensity Level, PT: II (Moderate Resource Intensity)    See flowsheet documentation for full assessment.

## 2024-02-06 NOTE — PROGRESS NOTES
Progress Note - Vascular Surgery   Lona Cedeno 78 y.o. female MRN: [id number]  Unit/Bed#: [facility] 503-01 Encounter: [id number]    Assessment:  78 y.o. with PMH HTN, HLD, COPD, DM, Right Ankle ORIF presenting with AIOD, Right CLTI c exposed, necrotic heel wound.     Vascular surgery consulted for AIOD, Right CLTI c exposed necrotic heel wound.      1/31: Right Ax to Fem PTFE bypass  2/4: Right chest wall hematoma evacuation, right foot wound and achilles debridement with 2nd toe partial amputation, VAC placement     Plan:  - No further vascular surgery intervention planned. Proximal incision site edema/reaccumulation likely seroma, stable in size, soft.  - Podiatry following for R foot wound  - Plastic Surgery following  - Continue ASA/statin  - Prevena removed at bedside this AM, R groin dressed with 4x4   - Remainder of care per primary team    Subjective:  No acute events overnight. No acute complaints this AM.    Objective:    Vitals:   Afebrile, Normal VS on 2 L nc.  Temp:  [97.9 °F (36.6 °C)-98.9 °F (37.2 °C)] 98 °F (36.7 °C)  HR:  [78-83] 78  Resp:  [16-20] 20  BP: (110-114)/(46-52) 112/46  Body mass index is 27.53 kg/m².    Physical Exam:   Gen: NAD, Resting in bed  Head: Normocephalic, atraumatic  Eye: EOMI, No scleral icterus  CV: Regular rate  Pulm: Normal work of breathing, No respiratory distress   Ext: Right axillary exposure site with swelling. R foot with clean and dry dressing in place, vac per podiatry  Skin: Warm, Dry, Intact    I/O:  UOP 2.75 L + 1x unmeasured     Intake/Output Summary (Last 24 hours) at 2/6/2024 0702  Last data filed at 2/6/2024 0516  Gross per 24 hour   Intake 1200 ml   Output 1850 ml   Net -650 ml       Lab Results:    Recent Labs     02/04/24  0440 02/05/24  0531 02/06/24  0458   WBC 9.04 9.38 9.21   HGB 10.9* 9.8* 9.9*    263 258   SODIUM 134* 137 138   K 4.1 4.3 4.1   CL 99 100 103   CO2 29 28 26   BUN 12 13 11   CREATININE 0.50* 0.51* 0.50*   GLUC 144* 150* 140    CALCIUM 9.5 9.3 9.5   AST 11* 11*  --    ALT 4* 3*  --    ALKPHOS 48 46  --    TBILI 0.49 0.51  --

## 2024-02-06 NOTE — CASE MANAGEMENT
Case Management Discharge Planning Note    Patient name Lona Cedeno  Location [facility] 503/[facility] 503-01 MRN [id number]  : 1945 Date 2024       Current Admission Date: 2024  Current Admission Diagnosis:Diabetic foot ulcer with osteomyelitis (HCC)   Patient Active Problem List    Diagnosis Date Noted    Hematoma 2024    Anemia 2024    Constipation 2024    Preoperative cardiovascular examination 2024    Diabetic foot ulcer with osteomyelitis (HCC) 2024    Ankle ulcer, right, with fat layer exposed ([facility]) 2023    Age-related osteoporosis without current pathological fracture 2023    Abnormal CT of the chest 10/17/2023    COPD, severity to be determined ([facility]) 10/17/2023    Tobacco use disorder 10/17/2023    PAD (peripheral artery disease) ([facility]) 10/10/2023    Bladder neoplasm 2023    Left renal mass 2023    Lactic acidosis 2023    Hypomagnesemia 2023    Degenerative lumbar disc 2023    Urinary incontinence 2023    GERD without esophagitis 10/16/2019    Medicare annual wellness visit, subsequent 2019    Essential hypertension 02/15/2019    Anxiety and depression 02/15/2019    Type 2 diabetes mellitus with diabetic polyneuropathy ([facility]) 2019      LOS (days): 11  Geometric Mean LOS (GMLOS) (days): 3.8  Days to GMLOS:-6.9     OBJECTIVE:  Risk of Unplanned Readmission Score: 19.24         Current admission status: Inpatient   Preferred Pharmacy:   [id number] [address] [address]  Phone: [phone] Fax: [phone]    [facility] [place] [facility] [address]  [address]  [place] [address]  Phone: [phone] Fax: [phone]    Primary Care Provider: Vivek Preston DO    Primary Insurance: [facility] REP  Secondary Insurance:     DISCHARGE DETAILS:    CM met w/ Pt at bedside to discuss STR choices as well as acute rehab as Pt  had a PMR consult and appears appropriate for acute rehab. Pt requesting a referral so [facility], submitted in [facility].

## 2024-02-06 NOTE — ASSESSMENT & PLAN NOTE
Over right chest wall  S/p evacuation by vascular surgery on 2/4  Discussed with vascular surgery okay to restart DVT prophylaxis

## 2024-02-06 NOTE — ASSESSMENT & PLAN NOTE
Transferred to [facility] for vascular eval/ongoing podiatry eval   Cleared by cardiology  S/p right Ax to fem bypass on 1/31  S/p right foot wound and Achilles debridement with second toe partial amputation and VAC placement   Discussed with vascular surgery and no further vascular surgery intervention planned  Reviewed plastic surgery evaluation unfortunately patient is not a candidate for free muscle flap at this time and will benefit from STSG for skin graft substitute  Monitoring off antibiotics per ID evaluation at [facility]  Podiatry following and performing wound dressing changes, pending final recommendations  Nonweightbearing on right foot

## 2024-02-06 NOTE — OCCUPATIONAL THERAPY NOTE
Occupational Therapy cx        Patient Name: Lona Cedeno  Today's Date: 2/6/2024 02/06/24 1444   OT Last Visit   OT Visit Date 02/06/24   Note Type   Note Type Treatment   Cancel Reasons Other  (attempted to see pt this afternoon, however she was asleep. will hold and address as able.)         MINDY Wooten, OTR/L

## 2024-02-06 NOTE — ARC ADMISSION
ARC  contacted the patient and or family: introduced self, explained role, reviewed ARC program, services offered, acute rehab criteria, approval process, insurance authorization process, ARC locations and preferences. Patient / family preferred ARC location is [place] and second option is [place]. Patient / family made aware ARC Reviewer will keep their Care Manager updated regarding referral status.      Thank you,   Quiana Nassar  ARC Admissions

## 2024-02-06 NOTE — PROGRESS NOTES
PHYSICAL MEDICINE AND REHABILITATION PROGRESS NOTE  Lona Cedeno 78 y.o. female MRN: [id number]  Unit/Bed#: [facility] 503-01 Encounter: [id number]    Requested by (Physician/Service): Ofe Wood MD  Reason for Consultation:  Assessment of rehabilitation needs    Assessment:  Rehabilitation Diagnosis:   Diabetic right foot ulcer with osteomyelitis s/p fem bypass, right foot wound and achilles debridement with 2nd toe partial amputation and VAC placement   Impaired mobility and self care    Recommendations:  Rehabilitation Plan:  Continue PT/OT (SLP) while on acute care.  The patient is a candidate for acute inpatient rehabilitation once medically cleared and surgical intervention are complete for this admission. She is able to tolerate 3 hours of therapy a day 5 to 6 days a week. She would benefit from 24/7 physician/nursing oversight given multiple intervention to her RLE with VAC, monitoring of wound healing as she is at risk for more proximal amputation as well as pain management and bowel/bladder management.       Medical Co-morbidities Plan:  Right chest wall hematoma s/p evacuation   Constipation, agree with starting miralax in addition for senna  Tobacco use  COPD  PAD  Acute pain due to surgery  HTN  Anxiety/depression   Diabetes type 2   DVT ppx: SCD      Thank you for this consultation.  Do not hesitate to contact service with further questions.      ROXANE Franco  PM&R        I have spent a total time of 15 minutes on 02/06/24 in caring for this patient including Patient and family education, Counseling / Coordination of care, Documenting in the medical record, Obtaining or reviewing history  , and Communicating with other healthcare professionals .        Subjective/Interval history:  The patient elected to try further interventions to salvage her right foot and plastics was consulted. She underwent fem bypass on 1/31 and right foot wound and achilles debridement with 2nd toe partial amputation  "and VAC placement. She will likely undergo STSG for skin graft substitute with plastics. Her hospital course was complicated by right chest wall hematoma and she underwent evacuation with vascular on 2/4/2024. PM&R continues to follow for rehabilitation recommendations.    The patient was seen in her room. VAC remains in place. She is NWB to the E. She report no pain in her foot but does report that swelling at site of right chest wall hematoma and is tender to touch. She would really like to go home soon.     Review of Systems: 10 point ROS negative except for what is noted in HPI    Function:  Prior level of function and living situation: The patient lives with her spouse and daughter who has intellectual delay, spouse has Parkinson's and is legally blind. She has an additional daughter who lives nearby and can assist if needed. They live in a ranch style home with 5 PATO.       Current level of function:  Physical Therapy: Minimal assist for transfers and functional mobility   Occupational Therapy: Minimal assist for grooming, moderate assist for LB dressing    Physical Exam:  /68 (BP Location: Left arm)   Pulse 71   Temp 97.5 °F (36.4 °C) (Oral)   Resp 15   Ht 5' 1\" (1.549 m)   Wt 66.1 kg (145 lb 11.6 oz)   SpO2 94%   BMI 27.53 kg/m²        Intake/Output Summary (Last 24 hours) at 2/6/2024 1159  Last data filed at 2/6/2024 0758  Gross per 24 hour   Intake 1380 ml   Output 1850 ml   Net -470 ml       Body mass index is 27.53 kg/m².      Physical Exam  Constitutional:       General: She is not in acute distress.     Appearance: She is not toxic-appearing.   HENT:      Head: Normocephalic and atraumatic.      Right Ear: External ear normal.      Left Ear: External ear normal.      Nose: Nose normal.      Mouth/Throat:      Mouth: Mucous membranes are moist.      Pharynx: Oropharynx is clear.   Eyes:      Extraocular Movements: Extraocular movements intact.   Pulmonary:      Effort: Pulmonary effort is " normal. No respiratory distress.      Comments: Right chest wall swelling with dressing in place   Musculoskeletal:      Comments: VAC to RLE  Bilateral UE/LLE 5/5 throughout    Skin:     General: Skin is warm and dry.   Neurological:      Mental Status: She is alert and oriented to person, place, and time.   Psychiatric:         Mood and Affect: Mood normal.          Social History:    Social History     Socioeconomic History    Marital status: /Civil Union     Spouse name: Not on file    Number of children: Not on file    Years of education: Not on file    Highest education level: Not on file   Occupational History    Not on file   Tobacco Use    Smoking status: Every Day     Current packs/day: 1.00     Average packs/day: 1 pack/day for 63.1 years (63.1 ttl pk-yrs)     Types: Cigarettes     Start date: 1961    Smokeless tobacco: Never    Tobacco comments:     11/14/23 smokes about 3/4 PPD   Vaping Use    Vaping status: Never Used   Substance and Sexual Activity    Alcohol use: Not Currently    Drug use: Never    Sexual activity: Not on file   Other Topics Concern    Not on file   Social History Narrative    Not on file     Social Determinants of Health     Financial Resource Strain: Low Risk  (2/9/2023)    Overall Financial Resource Strain (CARDIA)     Difficulty of Paying Living Expenses: Not hard at all   Food Insecurity: No Food Insecurity (1/24/2024)    Hunger Vital Sign     Worried About Running Out of Food in the Last Year: Never true     Ran Out of Food in the Last Year: Never true   Transportation Needs: No Transportation Needs (1/24/2024)    PRAPARE - Transportation     Lack of Transportation (Medical): No     Lack of Transportation (Non-Medical): No   Physical Activity: Not on file   Stress: Not on file   Social Connections: Not on file   Intimate Partner Violence: Not on file   Housing Stability: Low Risk  (1/24/2024)    Housing Stability Vital Sign     Unable to Pay for Housing in the Last Year:  No     Number of Places Lived in the Last Year: 1     Unstable Housing in the Last Year: No        Family History:    Family History   Problem Relation Age of Onset    COPD Mother     Emphysema Mother     COPD Father     Emphysema Father          Medications:     Current Facility-Administered Medications:     acetaminophen (TYLENOL) tablet 975 mg, 975 mg, Oral, Q8H JILLIAN, Narinder P Allsbrook, DO, 975 mg at 02/06/24 0513    albuterol (PROVENTIL HFA,VENTOLIN HFA) inhaler 2 puff, 2 puff, Inhalation, Q6H PRN, Narinder P Allsbrook, DO    AMILoride tablet 5 mg, 5 mg, Oral, Daily, Narinder P Allsbrook, DO, 5 mg at 02/06/24 0805    aspirin (ECOTRIN LOW STRENGTH) EC tablet 81 mg, 81 mg, Oral, Daily, Narinder P Allsbrook, DO, 81 mg at 02/06/24 0804    atorvastatin (LIPITOR) tablet 10 mg, 10 mg, Oral, Daily With Dinner, Narinder P Allsbrook, DO, 10 mg at 02/05/24 1730    buPROPion (WELLBUTRIN XL) 24 hr tablet 300 mg, 300 mg, Oral, Daily, Narinder P Allsbrook, DO, 300 mg at 02/06/24 0804    clonazePAM (KlonoPIN) tablet 1 mg, 1 mg, Oral, QPM, Narinder P Allsbrook, DO, 1 mg at 02/05/24 1730    fish oil capsule 1,000 mg, 1,000 mg, Oral, Daily, Narinder P Allsbrook, DO, 1,000 mg at 02/06/24 0804    fluticasone-vilanterol 200-25 mcg/actuation 1 puff, 1 puff, Inhalation, Daily, Narinder P Allsbrook, DO, 1 puff at 02/06/24 0805    HYDROmorphone HCl (DILAUDID) injection 0.2 mg, 0.2 mg, Intravenous, Q4H PRN, Narinder P Allsbrook, DO, 0.2 mg at 02/01/24 1214    insulin lispro (HumaLOG) 100 units/mL subcutaneous injection 1-5 Units, 1-5 Units, Subcutaneous, TID AC, 1 Units at 02/05/24 1213 **AND** Fingerstick Glucose (POCT), , , TID AC, Narinder Street,     insulin lispro (HumaLOG) 100 units/mL subcutaneous injection 1-5 Units, 1-5 Units, Subcutaneous, HS, Narinder Street DO, 1 Units at 02/05/24 2118    insulin lispro (HumaLOG) 100 units/mL subcutaneous injection 5 Units, 5 Units, Subcutaneous, TID With Meals, Narinder Street DO, 5  Units at 24 0805    methocarbamol (ROBAXIN) tablet 250 mg, 250 mg, Oral, Q8H JILLIAN, Narinder P Allsbrook, DO, 250 mg at 24 0513    nicotine (NICODERM CQ) 21 mg/24 hr TD 24 hr patch 1 patch, 1 patch, Transdermal, Daily, Narinder P Allsbrook, DO, 1 patch at 24 0804    ondansetron (ZOFRAN) injection 4 mg, 4 mg, Intravenous, Q6H PRN, Narinder P Allsbrook, DO    oxybutynin (DITROPAN-XL) 24 hr tablet 5 mg, 5 mg, Oral, Daily, Narinder P Allsbrook, DO, 5 mg at 24 0804    oxyCODONE (ROXICODONE) split tablet 2.5 mg, 2.5 mg, Oral, Q4H PRN, 2.5 mg at 24 **OR** oxyCODONE (ROXICODONE) IR tablet 5 mg, 5 mg, Oral, Q4H PRN, Narinder P Allsbrook, DO, 5 mg at 24 0810    pantoprazole (PROTONIX) EC tablet 40 mg, 40 mg, Oral, Early Morning, Narinder P Allsbrook, DO, 40 mg at 24 0804    polyethylene glycol (MIRALAX) packet 17 g, 17 g, Oral, Daily, Ofe Wood MD    pregabalin (LYRICA) capsule 200 mg, 200 mg, Oral, TID, Narinder P Allsbrook, DO, 200 mg at 24 0804    senna (SENOKOT) tablet 8.6 mg, 1 tablet, Oral, HS, Narinder P Allsbrook, DO, 8.6 mg at 24    Past Medical History:     Past Medical History:   Diagnosis Date    Anxiety     Closed fracture of coccyx (HCC) 2023    Diabetes mellitus (HCC)     GERD (gastroesophageal reflux disease)     Hyperlipidemia     Hypertension     IBS (irritable bowel syndrome)     Shoulder fracture         Past Surgical History:     Past Surgical History:   Procedure Laterality Date    ANKLE FRACTURE SURGERY Right     has screw in place     SECTION      x2    CHOLECYSTECTOMY      CYSTOSCOPY W/ LASER LITHOTRIPSY Left 2023    Procedure: CYSTOSCOPY; RETROGRADE; URETEROSCOPY; BIOPSY; LEFT -INSERTION OF STENT;  Surgeon: Cristian Child MD;  Location: [facility];  Service: Urology    CYSTOSCOPY W/ LASER LITHOTRIPSY Left 2023    Procedure: CYSTOSCOPY; RETROGRADE; URETEROSCOPY; LASER ABLATION OF LEFT RENAL COLLECTING SYSTEM  TUMOR;  Surgeon: Cristian Child MD;  Location: [facility];  Service: Urology    EVACUATION OF HEMATOMA Right 2/4/2024    Procedure: EVACUATION/ DRAINAGE CHEST WALL  HEMATOMA;  Surgeon: Seth Hoyos MD;  Location: [facility];  Service: Vascular    FL RETROGRADE PYELOGRAM  9/18/2023    HERNIA REPAIR      umbilicus w/ mesh    AZ BYPASS W/VEIN AXILLARY-FEMORAL Right 1/31/2024    Procedure: BYPASS AXILLO-FEMORAL, RIGHT WITH 8MM RINGED PTFE GRAFT;  Surgeon: Seth Hoyos MD;  Location: [facility];  Service: Vascular    WOUND DEBRIDEMENT Right 2/4/2024    Procedure: RIGHT WOUND AND ACHILLES DEBRIDEMENT FOOT/TOE (WASH OUT); PARTIAL AMPUTATION DISTAL 2ND TOE;  Surgeon: Aaron Montero DPM;  Location: [facility];  Service: Podiatry         Allergies:     Allergies   Allergen Reactions    Paroxetine Other (See Comments)     Became suicidal    Adhesive [Medical Tape] Itching and Blisters    Carafate [Sucralfate] Other (See Comments)     Mouth sores, tongue sore    Sulfa Antibiotics Hives and Rash    Sulfamethoxazole-Trimethoprim Hives           LABORATORY RESULTS:      Lab Results   Component Value Date    HGB 9.9 (L) 02/06/2024    HGB 16.8 (H) 05/21/2018    HCT 31.7 (L) 02/06/2024    HCT 50.1 (H) 05/21/2018    WBC 9.21 02/06/2024    WBC 9.0 05/21/2018     Lab Results   Component Value Date    BUN 11 02/06/2024    BUN 15 05/21/2018     05/21/2018    K 4.1 02/06/2024    K 4.0 05/21/2018     02/06/2024    CL 95 (L) 05/21/2018    GLUCOSE 143 (H) 01/31/2024    CREATININE 0.50 (L) 02/06/2024    CREATININE 0.62 05/21/2018     Lab Results   Component Value Date    PROTIME 13.4 02/04/2024    INR 1.03 02/04/2024        DIAGNOSTIC STUDIES: Reviewed  No results found.

## 2024-02-07 ENCOUNTER — DOCUMENTATION (OUTPATIENT)
Dept: VASCULAR SURGERY | Facility: CLINIC | Age: 79
End: 2024-02-07

## 2024-02-07 LAB
ANION GAP SERPL CALCULATED.3IONS-SCNC: 5 MMOL/L
BASOPHILS # BLD AUTO: 0.04 THOUSANDS/ÂΜL (ref 0–0.1)
BASOPHILS NFR BLD AUTO: 0 % (ref 0–1)
BUN SERPL-MCNC: 16 MG/DL (ref 5–25)
CALCIUM SERPL-MCNC: 9.4 MG/DL (ref 8.4–10.2)
CHLORIDE SERPL-SCNC: 101 MMOL/L (ref 96–108)
CO2 SERPL-SCNC: 31 MMOL/L (ref 21–32)
CREAT SERPL-MCNC: 0.62 MG/DL (ref 0.6–1.3)
EOSINOPHIL # BLD AUTO: 0.85 THOUSAND/ÂΜL (ref 0–0.61)
EOSINOPHIL NFR BLD AUTO: 8 % (ref 0–6)
ERYTHROCYTE [DISTWIDTH] IN BLOOD BY AUTOMATED COUNT: 13.9 % (ref 11.6–15.1)
GFR SERPL CREATININE-BSD FRML MDRD: 87 ML/MIN/1.73SQ M
GLUCOSE SERPL-MCNC: 145 MG/DL (ref 65–140)
GLUCOSE SERPL-MCNC: 161 MG/DL (ref 65–140)
GLUCOSE SERPL-MCNC: 169 MG/DL (ref 65–140)
GLUCOSE SERPL-MCNC: 218 MG/DL (ref 65–140)
GLUCOSE SERPL-MCNC: 93 MG/DL (ref 65–140)
HCT VFR BLD AUTO: 32 % (ref 34.8–46.1)
HGB BLD-MCNC: 10.2 G/DL (ref 11.5–15.4)
IMM GRANULOCYTES # BLD AUTO: 0.04 THOUSAND/UL (ref 0–0.2)
IMM GRANULOCYTES NFR BLD AUTO: 0 % (ref 0–2)
LYMPHOCYTES # BLD AUTO: 2.36 THOUSANDS/ÂΜL (ref 0.6–4.47)
LYMPHOCYTES NFR BLD AUTO: 23 % (ref 14–44)
MCH RBC QN AUTO: 31.2 PG (ref 26.8–34.3)
MCHC RBC AUTO-ENTMCNC: 31.9 G/DL (ref 31.4–37.4)
MCV RBC AUTO: 98 FL (ref 82–98)
MONOCYTES # BLD AUTO: 0.41 THOUSAND/ÂΜL (ref 0.17–1.22)
MONOCYTES NFR BLD AUTO: 4 % (ref 4–12)
NEUTROPHILS # BLD AUTO: 6.69 THOUSANDS/ÂΜL (ref 1.85–7.62)
NEUTS SEG NFR BLD AUTO: 65 % (ref 43–75)
NRBC BLD AUTO-RTO: 0 /100 WBCS
PLATELET # BLD AUTO: 344 THOUSANDS/UL (ref 149–390)
PMV BLD AUTO: 9.6 FL (ref 8.9–12.7)
POTASSIUM SERPL-SCNC: 4.4 MMOL/L (ref 3.5–5.3)
RBC # BLD AUTO: 3.27 MILLION/UL (ref 3.81–5.12)
SODIUM SERPL-SCNC: 137 MMOL/L (ref 135–147)
WBC # BLD AUTO: 10.39 THOUSAND/UL (ref 4.31–10.16)

## 2024-02-07 PROCEDURE — 80048 BASIC METABOLIC PNL TOTAL CA: CPT | Performed by: STUDENT IN AN ORGANIZED HEALTH CARE EDUCATION/TRAINING PROGRAM

## 2024-02-07 PROCEDURE — 99232 SBSQ HOSP IP/OBS MODERATE 35: CPT | Performed by: STUDENT IN AN ORGANIZED HEALTH CARE EDUCATION/TRAINING PROGRAM

## 2024-02-07 PROCEDURE — 97530 THERAPEUTIC ACTIVITIES: CPT

## 2024-02-07 PROCEDURE — 82948 REAGENT STRIP/BLOOD GLUCOSE: CPT

## 2024-02-07 PROCEDURE — 97110 THERAPEUTIC EXERCISES: CPT

## 2024-02-07 PROCEDURE — 85025 COMPLETE CBC W/AUTO DIFF WBC: CPT | Performed by: STUDENT IN AN ORGANIZED HEALTH CARE EDUCATION/TRAINING PROGRAM

## 2024-02-07 PROCEDURE — 99024 POSTOP FOLLOW-UP VISIT: CPT | Performed by: SURGERY

## 2024-02-07 RX ORDER — SENNOSIDES 8.6 MG
2 TABLET ORAL
Status: DISCONTINUED | OUTPATIENT
Start: 2024-02-07 | End: 2024-02-07

## 2024-02-07 RX ORDER — SENNOSIDES 8.6 MG
2 TABLET ORAL
Status: DISCONTINUED | OUTPATIENT
Start: 2024-02-07 | End: 2024-02-09 | Stop reason: HOSPADM

## 2024-02-07 RX ADMIN — CLONAZEPAM 1 MG: 1 TABLET ORAL at 17:01

## 2024-02-07 RX ADMIN — FLUTICASONE FUROATE AND VILANTEROL TRIFENATATE 1 PUFF: 200; 25 POWDER RESPIRATORY (INHALATION) at 09:05

## 2024-02-07 RX ADMIN — HEPARIN SODIUM 5000 UNITS: 5000 INJECTION INTRAVENOUS; SUBCUTANEOUS at 05:50

## 2024-02-07 RX ADMIN — AMILORIDE HYDROCLORIDE 5 MG: 5 TABLET ORAL at 09:04

## 2024-02-07 RX ADMIN — OXYCODONE HYDROCHLORIDE 5 MG: 5 TABLET ORAL at 22:02

## 2024-02-07 RX ADMIN — INSULIN LISPRO 5 UNITS: 100 INJECTION, SOLUTION INTRAVENOUS; SUBCUTANEOUS at 09:11

## 2024-02-07 RX ADMIN — SENNOSIDES 17.2 MG: 8.6 TABLET, FILM COATED ORAL at 22:03

## 2024-02-07 RX ADMIN — ACETAMINOPHEN 975 MG: 325 TABLET, FILM COATED ORAL at 22:00

## 2024-02-07 RX ADMIN — ACETAMINOPHEN 975 MG: 325 TABLET, FILM COATED ORAL at 05:49

## 2024-02-07 RX ADMIN — SENNOSIDES 17.2 MG: 8.6 TABLET, FILM COATED ORAL at 09:09

## 2024-02-07 RX ADMIN — OMEGA-3 FATTY ACIDS CAP 1000 MG 1000 MG: 1000 CAP at 09:04

## 2024-02-07 RX ADMIN — ATORVASTATIN CALCIUM 10 MG: 10 TABLET, FILM COATED ORAL at 17:01

## 2024-02-07 RX ADMIN — INSULIN LISPRO 1 UNITS: 100 INJECTION, SOLUTION INTRAVENOUS; SUBCUTANEOUS at 09:11

## 2024-02-07 RX ADMIN — METHOCARBAMOL 250 MG: 500 TABLET ORAL at 22:01

## 2024-02-07 RX ADMIN — HEPARIN SODIUM 5000 UNITS: 5000 INJECTION INTRAVENOUS; SUBCUTANEOUS at 15:29

## 2024-02-07 RX ADMIN — BUPROPION HYDROCHLORIDE 300 MG: 150 TABLET, FILM COATED, EXTENDED RELEASE ORAL at 09:04

## 2024-02-07 RX ADMIN — INSULIN LISPRO 5 UNITS: 100 INJECTION, SOLUTION INTRAVENOUS; SUBCUTANEOUS at 11:25

## 2024-02-07 RX ADMIN — METHOCARBAMOL 250 MG: 500 TABLET ORAL at 05:49

## 2024-02-07 RX ADMIN — PREGABALIN 200 MG: 100 CAPSULE ORAL at 08:54

## 2024-02-07 RX ADMIN — PANTOPRAZOLE SODIUM 40 MG: 40 TABLET, DELAYED RELEASE ORAL at 09:04

## 2024-02-07 RX ADMIN — POLYETHYLENE GLYCOL 3350 17 G: 17 POWDER, FOR SOLUTION ORAL at 08:54

## 2024-02-07 RX ADMIN — METHOCARBAMOL 250 MG: 500 TABLET ORAL at 15:26

## 2024-02-07 RX ADMIN — HEPARIN SODIUM 5000 UNITS: 5000 INJECTION INTRAVENOUS; SUBCUTANEOUS at 22:01

## 2024-02-07 RX ADMIN — ACETAMINOPHEN 975 MG: 325 TABLET, FILM COATED ORAL at 15:27

## 2024-02-07 RX ADMIN — PREGABALIN 200 MG: 100 CAPSULE ORAL at 15:29

## 2024-02-07 RX ADMIN — INSULIN LISPRO 1 UNITS: 100 INJECTION, SOLUTION INTRAVENOUS; SUBCUTANEOUS at 22:04

## 2024-02-07 RX ADMIN — OXYBUTYNIN CHLORIDE 5 MG: 5 TABLET, EXTENDED RELEASE ORAL at 09:04

## 2024-02-07 RX ADMIN — NICOTINE 1 PATCH: 21 PATCH, EXTENDED RELEASE TRANSDERMAL at 09:12

## 2024-02-07 RX ADMIN — ASPIRIN 81 MG: 81 TABLET, COATED ORAL at 09:04

## 2024-02-07 RX ADMIN — PREGABALIN 200 MG: 100 CAPSULE ORAL at 22:00

## 2024-02-07 NOTE — PROGRESS NOTES
Vascular Nurse Navigator Post Op Education    Met with patient at bedside to introduce myself as Vascular Nurse Navigator and explained my role.  Patient is appropriate and accepting to education. Patient was educated with Review of written materials provided, Teachback, Explanation, Demonstration, and Question & Answer on expectations of post op care and recovery on  Right Ax to Fem PTFE bypass and Right chest wall hematoma evacuation. Patient is a smoker  (1 ppd x 63 yrs), as such Smoking effects on the lungs, tobacco triggers, and Smoking cessation was not reviewed.  This information is located on the discharge instruction handout that was provided, but not verbally reviewed. Education provided to patient on infection prevention, activity limitations, when to call the office, importance of follow up, and incisional care.  Discharge instruction handout provided to patient to review.

## 2024-02-07 NOTE — PLAN OF CARE
Problem: PHYSICAL THERAPY ADULT  Goal: Performs mobility at highest level of function for planned discharge setting.  See evaluation for individualized goals.  Description: Treatment/Interventions: Functional transfer training, Therapeutic exercise, Endurance training, Gait training, Bed mobility, Equipment eval/education, Elevations  Equipment Recommended: Walker       See flowsheet documentation for full assessment, interventions and recommendations.  Outcome: Progressing  Note: Prognosis: Fair  Problem List: Decreased strength, Decreased endurance, Impaired balance, Decreased mobility, Pain, Decreased skin integrity, Impaired judgement  Assessment: Patient received sitting edge of bed. Patient agreeable to therapy. Patient performs functional transfers with minimal assistance x1 using rolling walker. Patient performs x4 SPT + x5 STS throughout session. Patient performs toilet transfer to [place] with min A using RW. Patient performs ambulation with minimal assistance x1 using rolling walker, 2'x3. Patient performs therapeutic exercises as noted in flowsheet. Good tolerance to activity. Educated to perform 10-20 repetitions 3x/day for continued strengthening and endurance. Patient left sitting edge of bed with all needs met and call bell/personal items within reach. The patient's AM-PAC Basic Mobility Inpatient Short Form Raw Score is 15 showing further need for skilled PT services in order to improve functional mobility, decrease need for assistance, and return to PLOF. PT is recommending Level 2 - Moderate Resource Intensity on d/c from hospital.  Will continue to follow as able.  Barriers to Discharge: Inaccessible home environment, Decreased caregiver support     Rehab Resource Intensity Level, PT: II (Moderate Resource Intensity)    See flowsheet documentation for full assessment.

## 2024-02-07 NOTE — ASSESSMENT & PLAN NOTE
Over right chest wall. Possible seroma  S/p evacuation by vascular surgery on 2/4  Discussed with vascular surgery okay to restart DVT prophylaxis

## 2024-02-07 NOTE — PROGRESS NOTES
Patient:  BONIFACIO LEMUS    MRN:  [id number]    Aidin Request ID:  [id number]    Level of care reserved:  Inpatient Rehab Facility    Partner Reserved:  [facility] ([place]), [place] [id number] [phone]    Clinical needs requested:    Geography searched:  20 miles around [id number]    Start of Service:    Request sent:  12:41pm EST on 2/6/2024 by Shaneka Carlos    Partner reserved:  3:23pm EST on 2/7/2024 by Shaneka Carlos    Choice list shared:  3:22pm EST on 2/7/2024 by Shaneka Carlos

## 2024-02-07 NOTE — PROGRESS NOTES
[facility]  Progress Note  Name: Lona Cedeno I  MRN: [id number]  Unit/Bed#: [facility] 503-01 I Date of Admission: 1/26/2024   Date of Service: 2/7/2024 I Hospital Day: 12    Assessment/Plan   * Diabetic foot ulcer with osteomyelitis (HCC)  Assessment & Plan  Transferred to [facility] for vascular eval/ongoing podiatry eval   S/p right Ax to fem bypass on 1/31  S/p right foot wound and Achilles debridement with second toe partial amputation and VAC placement   Discussed with vascular surgery and no further vascular surgery intervention planned  Reviewed plastic surgery evaluation unfortunately patient is not a candidate for free muscle flap at this time and will benefit from STSG for skin graft substitute  Monitoring off antibiotics per ID evaluation at [facility]  Podiatry following: Next VAC change on 2/8 with evaluation for potential skin grafting   PT/OT evaluation with level 2 recommendation. PM&R accepted to [facility], appreciate recommendation  NWB on right foot. Fall precautions    Anemia  Assessment & Plan  Likely acute blood loss from wound, acute on chronic inflammation   Baseline 12-14  Monitor and transfuse if <7    Hematoma  Assessment & Plan  Over right chest wall. Possible seroma  S/p evacuation by vascular surgery on 2/4  Discussed with vascular surgery okay to restart DVT prophylaxis    Constipation  Assessment & Plan  Last BM on 2/2  Continue miralax and increase senna    Tobacco use disorder  Assessment & Plan  Educated on tobacco cessation  Continue nicotine patch    COPD, severity to be determined ([facility])  Assessment & Plan  Continue inhalers  Not in exacerbation  O2 sat 87-88% overnight, placed on 2 L nasal cannula.  Now on room air  Supplemental O2 to keep O2 sats between 88-92%    PAD (peripheral artery disease) ([facility])  Assessment & Plan  Continue aspirin and Lipitor  S/p bypass  Encouraged tobacco cessation    Anxiety and depression  Assessment & Plan  Continue bupropion and  clonazepam at bedtime    Essential hypertension  Assessment & Plan  Continue amiloride  BP reviewed and stable    Type 2 diabetes mellitus with diabetic polyneuropathy (HCC)  Assessment & Plan  Lab Results   Component Value Date    HGBA1C 6.5 (H) 11/07/2023       Recent Labs     02/06/24  1127 02/06/24  1551 02/06/24  2102 02/07/24  0612   POCGLU 155* 112 139 169*         Blood Sugar Average: Last 72 hrs:  (P) 149.2215428175100030  Outpatient regimen metformin 1000 mg twice daily and Januvia, holding  Continue Humalog 3 times daily and insulin sliding scale  Continue Lyrica  Accu-Cheks reviewed.  Continue               VTE Pharmacologic Prophylaxis: VTE Score: 4 Moderate Risk (Score 3-4) - Pharmacological DVT Prophylaxis Ordered: heparin.    Mobility:   Basic Mobility Inpatient Raw Score: 16  JH-HLM Goal: 5: Stand one or more mins  JH-HLM Achieved: 5: Stand (1 or more minutes)  HLM Goal achieved. Continue to encourage appropriate mobility.    Patient Centered Rounds: I performed bedside rounds with nursing staff today.   Discussions with Specialists or Other Care Team Provider: CM,    Education and Discussions with Family / Patient: Attempted to update  () via phone. Left voicemail.     Total Time Spent on Date of Encounter in care of patient: 35 mins. This time was spent on one or more of the following: performing physical exam; counseling and coordination of care; obtaining or reviewing history; documenting in the medical record; reviewing/ordering tests, medications or procedures; communicating with other healthcare professionals and discussing with patient's family/caregivers.    Current Length of Stay: 12 day(s)  Current Patient Status: Inpatient   Certification Statement: The patient will continue to require additional inpatient hospital stay due to pending podiatry eval on 2/8  Discharge Plan: Anticipate discharge in 48-72 hrs to rehab facility.    Code Status: Level 1 - Full  Code    Subjective:   No acute events overnight. Patient reports foot pain that mildly improved with Tylenol. Tolerating oral intake. Reports no BM in the last ~4 days.      Objective:     Vitals:   Temp (24hrs), Av °F (36.7 °C), Min:97.5 °F (36.4 °C), Max:98.4 °F (36.9 °C)    Temp:  [97.5 °F (36.4 °C)-98.4 °F (36.9 °C)] 98.4 °F (36.9 °C)  HR:  [80] 80  Resp:  [16] 16  BP: (118-132)/(49-51) 118/49  SpO2:  [90 %-92 %] 90 %  Body mass index is 27.53 kg/m².     Input and Output Summary (last 24 hours):     Intake/Output Summary (Last 24 hours) at 2024 0910  Last data filed at 2024 0754  Gross per 24 hour   Intake 1260 ml   Output 2000 ml   Net -740 ml       Physical Exam:   Physical Exam  Vitals and nursing note reviewed.   Constitutional:       General: She is not in acute distress.     Appearance: She is well-developed.   HENT:      Head: Normocephalic and atraumatic.   Eyes:      Conjunctiva/sclera: Conjunctivae normal.   Cardiovascular:      Rate and Rhythm: Normal rate and regular rhythm.      Heart sounds: No murmur heard.  Pulmonary:      Effort: Pulmonary effort is normal. No respiratory distress.      Breath sounds: Normal breath sounds. No wheezing.   Musculoskeletal:      Cervical back: Neck supple.   Skin:     General: Skin is warm and dry.   Neurological:      Mental Status: She is alert.   Psychiatric:         Mood and Affect: Mood normal.         Behavior: Behavior normal.              Additional Data:     Labs:  Results from last 7 days   Lab Units 24  0458   WBC Thousand/uL 9.21   HEMOGLOBIN g/dL 9.9*   HEMATOCRIT % 31.7*   PLATELETS Thousands/uL 258   NEUTROS PCT % 52   LYMPHS PCT % 29   MONOS PCT % 7   EOS PCT % 11*     Results from last 7 days   Lab Units 24  0458 24  0531   SODIUM mmol/L 138 137   POTASSIUM mmol/L 4.1 4.3   CHLORIDE mmol/L 103 100   CO2 mmol/L 26 28   BUN mg/dL 11 13   CREATININE mg/dL 0.50* 0.51*   ANION GAP mmol/L 9 9   CALCIUM mg/dL 9.5 9.3    ALBUMIN g/dL  --  3.0*   TOTAL BILIRUBIN mg/dL  --  0.51   ALK PHOS U/L  --  46   ALT U/L  --  3*   AST U/L  --  11*   GLUCOSE RANDOM mg/dL 140 150*     Results from last 7 days   Lab Units 02/04/24  0440   INR  1.03     Results from last 7 days   Lab Units 02/07/24  0612 02/06/24  2102 02/06/24  1551 02/06/24  1127 02/06/24  0547 02/05/24  2107 02/05/24  1539 02/05/24  1049 02/05/24  0811 02/04/24  2100 02/04/24  1605 02/04/24  1306   POC GLUCOSE mg/dl 169* 139 112 155* 130 153* 127 159* 228* 140 119 119         Results from last 7 days   Lab Units 02/03/24  0557   PROCALCITONIN ng/ml 0.06       Lines/Drains:  Invasive Devices       Peripheral Intravenous Line  Duration             Peripheral IV 02/04/24 Left Arm 2 days              Drain  Duration             Ureteral Internal Stent Left ureter 6 Fr. 79 days                          Imaging: No pertinent imaging reviewed.    Recent Cultures (last 7 days):         Last 24 Hours Medication List:   Current Facility-Administered Medications   Medication Dose Route Frequency Provider Last Rate    acetaminophen  975 mg Oral Q8H [facility] Narinder Street, DO      albuterol  2 puff Inhalation Q6H PRN Narinder Street, DO      AMILoride  5 mg Oral Daily Narinder P Lumaok, DO      aspirin  81 mg Oral Daily Narinder Geok, DO      atorvastatin  10 mg Oral Daily With Dinner Narinder Street, DO      buPROPion  300 mg Oral Daily Narinder P Allsbrook, DO      clonazePAM  1 mg Oral QPM Narinder P Lumaok, DO      fish oil  1,000 mg Oral Daily Narinder P Allsbrook, DO      fluticasone-vilanterol  1 puff Inhalation Daily Narinder Geok, DO      heparin (porcine)  5,000 Units Subcutaneous Q8H [facility] Ofe Wood MD      HYDROmorphone  0.2 mg Intravenous Q4H PRN Narinder Street, DO      insulin lispro  1-5 Units Subcutaneous TID AC Narinder Street, DO      insulin lispro  1-5 Units Subcutaneous HS Narinder Street,       insulin lispro  5 Units  Subcutaneous TID With Meals Narinder P Allsbrook, DO      methocarbamol  250 mg Oral Q8H JILLIAN Narinder P Allsbrook, DO      nicotine  1 patch Transdermal Daily Narinder P Allsbrook, DO      ondansetron  4 mg Intravenous Q6H PRN Narinder P Allsbrook, DO      oxybutynin  5 mg Oral Daily Narinder P Allsbrook, DO      oxyCODONE  2.5 mg Oral Q4H PRN Narinder P Allsbrook, DO      Or    oxyCODONE  5 mg Oral Q4H PRN Narinder P Allsbrook, DO      pantoprazole  40 mg Oral Early Morning Narinder P Allsbrook, DO      polyethylene glycol  17 g Oral Daily Ofe Wood MD      pregabalin  200 mg Oral TID Narinder P Jda, DO      senna  2 tablet Oral HS Ofe Wood MD          Today, Patient Was Seen By: Ofe Wood MD    **Please Note: This note may have been constructed using a voice recognition system.**

## 2024-02-07 NOTE — PHYSICAL THERAPY NOTE
PHYSICAL THERAPY NOTE          Patient Name: Lona Cedeno  Today's Date: 2/7/2024 02/07/24 1029   PT Last Visit   PT Visit Date 02/07/24   Note Type   Note Type Treatment   Pain Assessment   Pain Assessment Tool 0-10   Pain Score No Pain   Restrictions/Precautions   Weight Bearing Precautions Per Order Yes   RLE Weight Bearing Per Order NWB   Other Precautions WBS;Fall Risk;Telemetry  (wound vac)   General   Chart Reviewed Yes   Family/Caregiver Present No   Cognition   Overall Cognitive Status WFL   Arousal/Participation Alert;Cooperative   Attention Within functional limits   Orientation Level Oriented X4   Memory Decreased recall of precautions   Following Commands Follows one step commands without difficulty   Comments Patient is pleasant and cooperative   Subjective   Subjective Patient agreeable to PT tx   Bed Mobility   Additional Comments sitting EOB on arrival   Transfers   Sit to Stand 4  Minimal assistance   Additional items Assist x 1;Increased time required;Verbal cues   Stand to Sit 4  Minimal assistance   Additional items Assist x 1;Increased time required;Verbal cues   Stand pivot 4  Minimal assistance   Additional items Assist x 1;Increased time required;Verbal cues   Toilet transfer 4  Minimal assistance   Additional items Assist x 1;Increased time required;Verbal cues;Commode   Additional Comments RW - SPT x4 (bed>chair>BSC>chair>bed) - STS x5   Ambulation/Elevation   Gait pattern   (hop to gait pattern)   Gait Assistance 4  Minimal assist   Additional items Assist x 1;Verbal cues   Assistive Device Rolling walker   Distance 2'x3   Balance   Static Sitting Normal   Dynamic Sitting Good   Static Standing Fair -   Dynamic Standing Poor +   Ambulatory Poor +   Endurance Deficit   Endurance Deficit Yes   Endurance Deficit Description fatigue, weakness   Activity Tolerance   Activity Tolerance Patient tolerated  treatment well;Patient limited by fatigue   Nurse Made Aware RN cleared   Exercises   Glute Sets Sitting;15 reps;AROM   Hip Flexion Sitting;15 reps;AROM   Hip Abduction Sitting;15 reps;AROM   Hip Adduction Sitting;15 reps;AROM   Knee AROM Long Arc Quad Sitting;15 reps;AROM   Ankle Pumps Sitting;15 reps;AROM   Assessment   Prognosis Fair   Problem List Decreased strength;Decreased endurance;Impaired balance;Decreased mobility;Pain;Decreased skin integrity;Impaired judgement   Assessment Patient received sitting edge of bed. Patient agreeable to therapy. Patient performs functional transfers with minimal assistance x1 using rolling walker. Patient performs x4 SPT + x5 STS throughout session. Patient performs toilet transfer to [facility] with min A using RW. Patient performs ambulation with minimal assistance x1 using rolling walker, 2'x3. Patient performs therapeutic exercises as noted in flowsheet. Good tolerance to activity. Educated to perform 10-20 repetitions 3x/day for continued strengthening and endurance. Patient left sitting edge of bed with all needs met and call bell/personal items within reach. The patient's AM-PAC Basic Mobility Inpatient Short Form Raw Score is 15 showing further need for skilled PT services in order to improve functional mobility, decrease need for assistance, and return to PLOF. PT is recommending Level 2 - Moderate Resource Intensity on d/c from hospital.  Will continue to follow as able.   Barriers to Discharge Inaccessible home environment;Decreased caregiver support   Goals   Patient Goals to keep improving   PT Treatment Day 4   Plan   Treatment/Interventions Functional transfer training;LE strengthening/ROM;Elevations;Therapeutic exercise;Endurance training;Patient/family training;Equipment eval/education;Bed mobility;Gait training;OT;Spoke to nursing   Progress Progressing toward goals   PT Frequency 3-5x/wk   Discharge Recommendation   Rehab Resource Intensity Level, PT II (Moderate  Resource Intensity)   Equipment Recommended Wheelchair;Walker  (slideboard)   AM-PAC Basic Mobility Inpatient   Turning in Flat Bed Without Bedrails 3   Lying on Back to Sitting on Edge of Flat Bed Without Bedrails 3   Moving Bed to Chair 3   Standing Up From Chair Using Arms 3   Walk in Room 2   Climb 3-5 Stairs With Railing 1   Basic Mobility Inpatient Raw Score 15   Basic Mobility Standardized Score 36.97   Highest Level Of Mobility   JH-HLM Goal 4: Move to chair/commode   JH-HLM Achieved 5: Stand (1 or more minutes)   End of Consult   Patient Position at End of Consult All needs within reach;Seated edge of bed     Danyell Munoz, PT, DPT

## 2024-02-07 NOTE — PLAN OF CARE
Problem: PAIN - ADULT  Goal: Verbalizes/displays adequate comfort level or baseline comfort level  Description: Interventions:  - Encourage patient to monitor pain and request assistance  - Assess pain using appropriate pain scale  - Administer analgesics based on type and severity of pain and evaluate response  - Implement non-pharmacological measures as appropriate and evaluate response  - Consider cultural and social influences on pain and pain management  - Notify physician/advanced practitioner if interventions unsuccessful or patient reports new pain  Outcome: Progressing     Problem: INFECTION - ADULT  Goal: Absence or prevention of progression during hospitalization  Description: INTERVENTIONS:  - Assess and monitor for signs and symptoms of infection  - Monitor lab/diagnostic results  - Monitor all insertion sites, i.e. indwelling lines, tubes, and drains  - Monitor endotracheal if appropriate and nasal secretions for changes in amount and color  - [place] appropriate cooling/warming therapies per order  - Administer medications as ordered  - Instruct and encourage patient and family to use good hand hygiene technique  - Identify and instruct in appropriate isolation precautions for identified infection/condition  Outcome: Progressing

## 2024-02-07 NOTE — OCCUPATIONAL THERAPY NOTE
Occupational Therapy cx        Patient Name: Lona Cedeno  Today's Date: 2/7/2024 02/07/24 1038   OT Last Visit   OT Visit Date 02/07/24   Note Type   Note Type Treatment   Cancel Reasons Refusal  (pt reports she just finished w PTand would like to hold for now. Had completed all ADL tasks prior to my arrival. Will hold and address as able.)         MINDY Wooten, OTR/L

## 2024-02-07 NOTE — ASSESSMENT & PLAN NOTE
Lab Results   Component Value Date    HGBA1C 6.5 (H) 11/07/2023       Recent Labs     02/06/24  1127 02/06/24  1551 02/06/24  2102 02/07/24  0612   POCGLU 155* 112 139 169*         Blood Sugar Average: Last 72 hrs:  (P) [id number]  Outpatient regimen metformin 1000 mg twice daily and Januvia, holding  Continue Humalog 3 times daily and insulin sliding scale  Continue Lyrica  Accu-Cheks reviewed.  Continue

## 2024-02-07 NOTE — ASSESSMENT & PLAN NOTE
Likely acute blood loss from wound, acute on chronic inflammation   Baseline 12-14  Monitor and transfuse if <7

## 2024-02-07 NOTE — ASSESSMENT & PLAN NOTE
77 y/o F w/ history of smoking, HTN, T2DM, GERD, COPD, R ankle fx s/p ORIF '08, AOID with nonhealing R foot wounds.     1/31: Right Ax to Fem PTFE bypass  2/4: Right chest wall hematoma evacuation, right foot wound and achilles debridement with 2nd toe partial amputation, VAC placement     Plan:  - No further vascular surgery intervention planned. Proximal incision site edema/reaccumulation likely seroma, stable in size, soft.  - Podiatry following for R foot wound  - Plastic Surgery following  - Continue ASA/Lipitor  - Outpatient follow-up in the [facility]

## 2024-02-07 NOTE — PROGRESS NOTES
"[facility]  Progress Note  Name: Lona Cedeno I  MRN: [id number]  [facility] Date of Admission: 1/26/2024   Date of Service: 2/7/2024 I Hospital Day: 12    Assessment/Plan   PAD (peripheral artery disease) ([facility])  Assessment & Plan  77 y/o F w/ history of smoking, HTN, T2DM, GERD, COPD, R ankle fx s/p ORIF '08, AOID with nonhealing R foot wounds.     1/31: Right Ax to Fem PTFE bypass  2/4: Right chest wall hematoma evacuation, right foot wound and achilles debridement with 2nd toe partial amputation, VAC placement     Plan:  - No further vascular surgery intervention planned. Proximal incision site edema/reaccumulation likely seroma, stable in size, soft.  - Podiatry following for R foot wound  - Plastic Surgery following  - Continue ASA/Lipitor  - Outpatient follow-up in the [facility]               Subjective:  Pt doing well    Vitals:  BP (!) 118/49   Pulse 80   Temp 98.4 °F (36.9 °C)   Resp 16   Ht 5' 1\" (1.549 m)   Wt 66.1 kg (145 lb 11.6 oz)   SpO2 90%   BMI 27.53 kg/m²     I/Os:  I/O last 3 completed shifts:  In: 1740 [P.O.:1740]  Out: 3100 [Urine:3100]  I/O this shift:  In: 180 [P.O.:180]  Out: -     Lab Results and Cultures:   Lab Results   Component Value Date    WBC 9.21 02/06/2024    HGB 9.9 (L) 02/06/2024    HCT 31.7 (L) 02/06/2024    MCV 98 02/06/2024     02/06/2024     Lab Results   Component Value Date    GLUCOSE 143 (H) 01/31/2024    CALCIUM 9.5 02/06/2024     05/21/2018    K 4.1 02/06/2024    CO2 26 02/06/2024     02/06/2024    BUN 11 02/06/2024    CREATININE 0.50 (L) 02/06/2024     Lab Results   Component Value Date    INR 1.03 02/04/2024    INR 1.11 02/03/2024    INR 1.03 01/23/2024    PROTIME 13.4 02/04/2024    PROTIME 14.2 02/03/2024    PROTIME 13.6 01/23/2024        Blood Culture:   Lab Results   Component Value Date    BLOODCX No Growth After 5 Days. 01/23/2024   ,   Urinalysis:   Lab Results   Component Value " Date    COLORU Yellow 11/20/2023    COLORU YELLOW 05/21/2018    CLARITYU Clear 11/20/2023    CLARITYU CLEAR 05/21/2018    SPECGRAV 1.010 11/20/2023    SPECGRAV 1.015 05/21/2018    PHUR 5.0 11/20/2023    PHUR 6.0 05/21/2018    LEUKOCYTESUR Negative 11/20/2023    LEUKOCYTESUR NEGATIVE 05/21/2018    NITRITE Negative 11/20/2023    NITRITE NEGATIVE 05/21/2018    PROTEINUA NEGATIVE 05/21/2018    GLUCOSEU Negative 11/20/2023    GLUCOSEU NEGATIVE 05/21/2018    KETONESU Negative 11/20/2023    KETONESU NEGATIVE 05/21/2018    BILIRUBINUR Negative 11/20/2023    BILIRUBINUR NEGATIVE 05/21/2018    BLOODU Negative 11/20/2023    BLOODU NEGATIVE 05/21/2018   ,   Urine Culture:   Lab Results   Component Value Date    URINECX No Growth <1000 cfu/mL 11/20/2023   ,   Wound Culure:   Lab Results   Component Value Date    WOUNDCULT 1+ Growth of 01/23/2024       Medications:  Current Facility-Administered Medications   Medication Dose Route Frequency    acetaminophen (TYLENOL) tablet 975 mg  975 mg Oral Q8H JILLIAN    albuterol (PROVENTIL HFA,VENTOLIN HFA) inhaler 2 puff  2 puff Inhalation Q6H PRN    AMILoride tablet 5 mg  5 mg Oral Daily    aspirin (ECOTRIN LOW STRENGTH) EC tablet 81 mg  81 mg Oral Daily    atorvastatin (LIPITOR) tablet 10 mg  10 mg Oral Daily With Dinner    buPROPion (WELLBUTRIN XL) 24 hr tablet 300 mg  300 mg Oral Daily    clonazePAM (KlonoPIN) tablet 1 mg  1 mg Oral QPM    fish oil capsule 1,000 mg  1,000 mg Oral Daily    fluticasone-vilanterol 200-25 mcg/actuation 1 puff  1 puff Inhalation Daily    heparin (porcine) subcutaneous injection 5,000 Units  5,000 Units Subcutaneous Q8H JILLIAN    HYDROmorphone HCl (DILAUDID) injection 0.2 mg  0.2 mg Intravenous Q4H PRN    insulin lispro (HumaLOG) 100 units/mL subcutaneous injection 1-5 Units  1-5 Units Subcutaneous TID AC    insulin lispro (HumaLOG) 100 units/mL subcutaneous injection 1-5 Units  1-5 Units Subcutaneous HS    insulin lispro (HumaLOG) 100 units/mL subcutaneous  injection 5 Units  5 Units Subcutaneous TID With Meals    methocarbamol (ROBAXIN) tablet 250 mg  250 mg Oral Q8H JILLIAN    nicotine (NICODERM CQ) 21 mg/24 hr TD 24 hr patch 1 patch  1 patch Transdermal Daily    ondansetron (ZOFRAN) injection 4 mg  4 mg Intravenous Q6H PRN    oxybutynin (DITROPAN-XL) 24 hr tablet 5 mg  5 mg Oral Daily    oxyCODONE (ROXICODONE) split tablet 2.5 mg  2.5 mg Oral Q4H PRN    Or    oxyCODONE (ROXICODONE) IR tablet 5 mg  5 mg Oral Q4H PRN    pantoprazole (PROTONIX) EC tablet 40 mg  40 mg Oral Early Morning    polyethylene glycol (MIRALAX) packet 17 g  17 g Oral Daily    pregabalin (LYRICA) capsule 200 mg  200 mg Oral TID    senna (SENOKOT) tablet 8.6 mg  1 tablet Oral HS       Physical Exam:    General appearance: alert and oriented, in no acute distress  Lungs: clear to auscultation bilaterally  Heart: S1, S2 normal  Abdomen: soft, non-tender; bowel sounds normal; no masses,  no organomegaly  Extremities: extremities warm and well-perfused; no cyanosis. Right foot VAC intact wrapped CDI    Wound/Incision:  Right chest wall, groin incision clean, dry, and intact    Pulse exam:  Radial: Right: 2+   AT: Right: 2+   PT: Right: 2+       Shaneka Ortega PA-C  2/7/2024

## 2024-02-07 NOTE — RESTORATIVE TECHNICIAN NOTE
Restorative Technician Note      Patient Name: Lona Cedeno     Note Type: Mobility  Patient Position Upon Consult: Seated edge of bed  Activity Performed: Transferred  Assistive Device: Roller walker  Patient Position at End of Consult: Other (comment); All needs within reach (Commode)

## 2024-02-07 NOTE — ASSESSMENT & PLAN NOTE
Transferred to B for vascular eval/ongoing podiatry eval   S/p right Ax to fem bypass on 1/31  S/p right foot wound and Achilles debridement with second toe partial amputation and VAC placement   Discussed with vascular surgery and no further vascular surgery intervention planned  Reviewed plastic surgery evaluation unfortunately patient is not a candidate for free muscle flap at this time and will benefit from STSG for skin graft substitute  Monitoring off antibiotics per ID evaluation at [facility]  Podiatry following: Next VAC change on 2/8 with evaluation for potential skin grafting   PT/OT evaluation with level 2 recommendation. PM&R accepted to [facility], appreciate recommendation  NWB on right foot. Fall precautions

## 2024-02-08 LAB
GLUCOSE SERPL-MCNC: 114 MG/DL (ref 65–140)
GLUCOSE SERPL-MCNC: 136 MG/DL (ref 65–140)
GLUCOSE SERPL-MCNC: 145 MG/DL (ref 65–140)
GLUCOSE SERPL-MCNC: 162 MG/DL (ref 65–140)

## 2024-02-08 PROCEDURE — 97535 SELF CARE MNGMENT TRAINING: CPT

## 2024-02-08 PROCEDURE — 88311 DECALCIFY TISSUE: CPT | Performed by: PATHOLOGY

## 2024-02-08 PROCEDURE — 88305 TISSUE EXAM BY PATHOLOGIST: CPT | Performed by: PATHOLOGY

## 2024-02-08 PROCEDURE — 97530 THERAPEUTIC ACTIVITIES: CPT

## 2024-02-08 PROCEDURE — 82948 REAGENT STRIP/BLOOD GLUCOSE: CPT

## 2024-02-08 PROCEDURE — 99232 SBSQ HOSP IP/OBS MODERATE 35: CPT | Performed by: STUDENT IN AN ORGANIZED HEALTH CARE EDUCATION/TRAINING PROGRAM

## 2024-02-08 RX ORDER — BISACODYL 10 MG
10 SUPPOSITORY, RECTAL RECTAL DAILY
Status: DISCONTINUED | OUTPATIENT
Start: 2024-02-08 | End: 2024-02-09 | Stop reason: HOSPADM

## 2024-02-08 RX ORDER — DOCUSATE SODIUM 100 MG/1
100 CAPSULE, LIQUID FILLED ORAL 2 TIMES DAILY
Status: DISCONTINUED | OUTPATIENT
Start: 2024-02-08 | End: 2024-02-09 | Stop reason: HOSPADM

## 2024-02-08 RX ADMIN — ACETAMINOPHEN 975 MG: 325 TABLET, FILM COATED ORAL at 06:00

## 2024-02-08 RX ADMIN — ACETAMINOPHEN 975 MG: 325 TABLET, FILM COATED ORAL at 13:41

## 2024-02-08 RX ADMIN — ASPIRIN 81 MG: 81 TABLET, COATED ORAL at 08:56

## 2024-02-08 RX ADMIN — FLUTICASONE FUROATE AND VILANTEROL TRIFENATATE 1 PUFF: 200; 25 POWDER RESPIRATORY (INHALATION) at 08:56

## 2024-02-08 RX ADMIN — NICOTINE 1 PATCH: 21 PATCH, EXTENDED RELEASE TRANSDERMAL at 09:04

## 2024-02-08 RX ADMIN — ATORVASTATIN CALCIUM 10 MG: 10 TABLET, FILM COATED ORAL at 17:02

## 2024-02-08 RX ADMIN — OMEGA-3 FATTY ACIDS CAP 1000 MG 1000 MG: 1000 CAP at 08:56

## 2024-02-08 RX ADMIN — HEPARIN SODIUM 5000 UNITS: 5000 INJECTION INTRAVENOUS; SUBCUTANEOUS at 13:40

## 2024-02-08 RX ADMIN — METHOCARBAMOL 250 MG: 500 TABLET ORAL at 06:00

## 2024-02-08 RX ADMIN — OXYBUTYNIN CHLORIDE 5 MG: 5 TABLET, EXTENDED RELEASE ORAL at 08:56

## 2024-02-08 RX ADMIN — POLYETHYLENE GLYCOL 3350 17 G: 17 POWDER, FOR SOLUTION ORAL at 08:55

## 2024-02-08 RX ADMIN — AMILORIDE HYDROCLORIDE 5 MG: 5 TABLET ORAL at 08:57

## 2024-02-08 RX ADMIN — HEPARIN SODIUM 5000 UNITS: 5000 INJECTION INTRAVENOUS; SUBCUTANEOUS at 06:00

## 2024-02-08 RX ADMIN — CLONAZEPAM 1 MG: 1 TABLET ORAL at 17:02

## 2024-02-08 RX ADMIN — DOCUSATE SODIUM 100 MG: 100 CAPSULE, LIQUID FILLED ORAL at 21:12

## 2024-02-08 RX ADMIN — INSULIN LISPRO 1 UNITS: 100 INJECTION, SOLUTION INTRAVENOUS; SUBCUTANEOUS at 08:56

## 2024-02-08 RX ADMIN — METHOCARBAMOL 250 MG: 500 TABLET ORAL at 21:12

## 2024-02-08 RX ADMIN — PANTOPRAZOLE SODIUM 40 MG: 40 TABLET, DELAYED RELEASE ORAL at 08:56

## 2024-02-08 RX ADMIN — PREGABALIN 200 MG: 100 CAPSULE ORAL at 17:02

## 2024-02-08 RX ADMIN — PREGABALIN 200 MG: 100 CAPSULE ORAL at 08:56

## 2024-02-08 RX ADMIN — INSULIN LISPRO 5 UNITS: 100 INJECTION, SOLUTION INTRAVENOUS; SUBCUTANEOUS at 08:56

## 2024-02-08 RX ADMIN — PREGABALIN 200 MG: 100 CAPSULE ORAL at 21:12

## 2024-02-08 RX ADMIN — INSULIN LISPRO 5 UNITS: 100 INJECTION, SOLUTION INTRAVENOUS; SUBCUTANEOUS at 11:31

## 2024-02-08 RX ADMIN — RIVAROXABAN 2.5 MG: 2.5 TABLET, FILM COATED ORAL at 21:16

## 2024-02-08 RX ADMIN — ACETAMINOPHEN 975 MG: 325 TABLET, FILM COATED ORAL at 21:12

## 2024-02-08 RX ADMIN — METHOCARBAMOL 250 MG: 500 TABLET ORAL at 13:41

## 2024-02-08 RX ADMIN — SENNOSIDES 17.2 MG: 8.6 TABLET, FILM COATED ORAL at 21:12

## 2024-02-08 RX ADMIN — BUPROPION HYDROCHLORIDE 300 MG: 150 TABLET, FILM COATED, EXTENDED RELEASE ORAL at 08:56

## 2024-02-08 NOTE — CASE MANAGEMENT
Case Management Discharge Planning Note    Patient name Lona Cedeno  Location [facility] 503/[facility] 503-01 MRN [id number]  : 1945 Date 2024       Current Admission Date: 2024  Current Admission Diagnosis:Diabetic foot ulcer with osteomyelitis (HCC)   Patient Active Problem List    Diagnosis Date Noted    Hematoma 2024    Anemia 2024    Constipation 2024    Preoperative cardiovascular examination 2024    Diabetic foot ulcer with osteomyelitis (HCC) 2024    Ankle ulcer, right, with fat layer exposed ([facility]) 2023    Age-related osteoporosis without current pathological fracture 2023    Abnormal CT of the chest 10/17/2023    COPD, severity to be determined ([facility]) 10/17/2023    Tobacco use disorder 10/17/2023    PAD (peripheral artery disease) ([facility]) 10/10/2023    Bladder neoplasm 2023    Left renal mass 2023    Lactic acidosis 2023    Hypomagnesemia 2023    Degenerative lumbar disc 2023    Urinary incontinence 2023    GERD without esophagitis 10/16/2019    Medicare annual wellness visit, subsequent 2019    Essential hypertension 02/15/2019    Anxiety and depression 02/15/2019    Type 2 diabetes mellitus with diabetic polyneuropathy ([facility]) 2019      LOS (days): 13  Geometric Mean LOS (GMLOS) (days): 3.8  Days to GMLOS:-9     OBJECTIVE:  Risk of Unplanned Readmission Score: 20.19         Current admission status: Inpatient   Preferred Pharmacy:   [id number] - [address] [address]  Phone: [phone] Fax: [phone]    [facility] [place] [facility] [address]  [address] [facility] [place] [address]  Phone: [phone] Fax: [phone]    Primary Care Provider: Vivek Preston DO    Primary Insurance: [facility] REP  Secondary Insurance:     DISCHARGE DETAILS:    Pt has been accepted to [facility] acute rehab. Pts first choice is the  [facility]. Per [facility] provider, Pt is medically ready. Insurance auth tasked to [facility] DC support.

## 2024-02-08 NOTE — PROGRESS NOTES
[facility]  Progress Note  Name: Lona Cedeno I  MRN: [id number]  Unit/Bed#: [facility]PHP 503-01 I Date of Admission: 1/26/2024   Date of Service: 2/8/2024 I Hospital Day: 13    Assessment/Plan   * Diabetic foot ulcer with osteomyelitis (HCC)  Assessment & Plan  Transferred to [facility] for vascular eval/ongoing podiatry eval   S/p right Ax to fem bypass on 1/31  S/p right foot wound and Achilles debridement with second toe partial amputation and VAC placement   Discussed with vascular surgery and no further vascular surgery intervention planned  Reviewed plastic surgery evaluation unfortunately patient is not a candidate for free muscle flap at this time and will benefit from STSG for skin graft substitute  Monitoring off antibiotics per ID evaluation at [facility]  Discussed with podiatry and patient will not need further OR interventions during this admission.  She will be discharged on wound VAC with follow-up with wound care and evaluation in the outpatient setting for possible split-thickness skin graft down the line.  PT/OT evaluation with level 2 recommendation. PM&R accepted to [facility], appreciate recommendation  NWB on right foot. Fall precautions    Anemia  Assessment & Plan  Likely acute blood loss from wound, acute on chronic inflammation   Baseline 12-14  Monitor and transfuse if <7    Hematoma  Assessment & Plan  Over right chest wall. Possible seroma  S/p evacuation by vascular surgery on 2/4  Discussed with vascular surgery okay to restart DVT prophylaxis    Constipation  Assessment & Plan  Patient reports 1 bowel movement today  Continue MiraLAX and senna    Tobacco use disorder  Assessment & Plan  Educated on tobacco cessation  Continue nicotine patch    COPD, severity to be determined (HCC)  Assessment & Plan  Continue inhalers  Not in exacerbation  O2 sat 87-88% overnight, placed on 2 L nasal cannula.  Now on room air  Supplemental O2 to keep O2 sats between 88-92%    PAD  (peripheral artery disease) (HCC)  Assessment & Plan  Continue aspirin and Lipitor  S/p bypass  Encouraged tobacco cessation    Anxiety and depression  Assessment & Plan  Continue bupropion and clonazepam at bedtime    Essential hypertension  Assessment & Plan  Continue amiloride  BP reviewed and stable    Type 2 diabetes mellitus with diabetic polyneuropathy (HCC)  Assessment & Plan  Lab Results   Component Value Date    HGBA1C 6.5 (H) 11/07/2023       Recent Labs     02/07/24  1534 02/07/24  2050 02/08/24  0541 02/08/24  1115   POCGLU 93 161* 162* 145*         Blood Sugar Average: Last 72 hrs:  (P) [id number]  Outpatient regimen metformin 1000 mg twice daily and Januvia, holding  Continue Humalog 3 times daily and insulin sliding scale  Continue Lyrica  Accu-Cheks reviewed.  Continue               VTE Pharmacologic Prophylaxis: VTE Score: 4 Moderate Risk (Score 3-4) - Pharmacological DVT Prophylaxis Ordered: heparin.    Mobility:   Basic Mobility Inpatient Raw Score: 15  -HLM Goal: 4: Move to chair/commode  JH-HLM Achieved: 5: Stand (1 or more minutes)  HLM Goal achieved. Continue to encourage appropriate mobility.    Patient Centered Rounds: I performed bedside rounds with nursing staff today.   Discussions with Specialists or Other Care Team Provider: , podiatry    Education and Discussions with Family / Patient: Updated  () at bedside.    Total Time Spent on Date of Encounter in care of patient: 35 mins. This time was spent on one or more of the following: performing physical exam; counseling and coordination of care; obtaining or reviewing history; documenting in the medical record; reviewing/ordering tests, medications or procedures; communicating with other healthcare professionals and discussing with patient's family/caregivers.    Current Length of Stay: 13 day(s)  Current Patient Status: Inpatient   Certification Statement: The patient will continue to require  additional inpatient hospital stay due to podiatry evaluation, dispo planning insurance Auth  Discharge Plan: Anticipate discharge in 24-48 hrs to rehab facility.    Code Status: Level 1 - Full Code    Subjective:   No acute events overnight.  Patient reports having a bowel movement this morning.  Tolerating oral intake.  Reports no foot pain.    Objective:     Vitals:   Temp (24hrs), Av.7 °F (37.1 °C), Min:97.8 °F (36.6 °C), Max:99.6 °F (37.6 °C)    Temp:  [97.8 °F (36.6 °C)-99.6 °F (37.6 °C)] 97.8 °F (36.6 °C)  HR:  [74-83] 83  Resp:  [17-18] 18  BP: (107-143)/() 141/58  SpO2:  [90 %-95 %] 92 %  Body mass index is 27.53 kg/m².     Input and Output Summary (last 24 hours):     Intake/Output Summary (Last 24 hours) at 2024 1209  Last data filed at 2024 1131  Gross per 24 hour   Intake 1616 ml   Output 2450 ml   Net -834 ml       Physical Exam:   Physical Exam  Vitals and nursing note reviewed.   Constitutional:       General: She is not in acute distress.     Appearance: She is well-developed.   HENT:      Head: Normocephalic and atraumatic.   Eyes:      Conjunctiva/sclera: Conjunctivae normal.   Cardiovascular:      Rate and Rhythm: Normal rate and regular rhythm.   Pulmonary:      Effort: Pulmonary effort is normal. No respiratory distress.      Breath sounds: Normal breath sounds.   Abdominal:      Palpations: Abdomen is soft.   Musculoskeletal:         General: No swelling.      Cervical back: Neck supple.   Skin:     General: Skin is warm and dry.   Neurological:      Mental Status: She is alert.   Psychiatric:         Mood and Affect: Mood normal.         Behavior: Behavior normal.          Additional Data:     Labs:  Results from last 7 days   Lab Units 24  0935   WBC Thousand/uL 10.39*   HEMOGLOBIN g/dL 10.2*   HEMATOCRIT % 32.0*   PLATELETS Thousands/uL 344   NEUTROS PCT % 65   LYMPHS PCT % 23   MONOS PCT % 4   EOS PCT % 8*     Results from last 7 days   Lab Units 24  0935  02/06/24  0458 02/05/24  0531   SODIUM mmol/L 137   < > 137   POTASSIUM mmol/L 4.4   < > 4.3   CHLORIDE mmol/L 101   < > 100   CO2 mmol/L 31   < > 28   BUN mg/dL 16   < > 13   CREATININE mg/dL 0.62   < > 0.51*   ANION GAP mmol/L 5   < > 9   CALCIUM mg/dL 9.4   < > 9.3   ALBUMIN g/dL  --   --  3.0*   TOTAL BILIRUBIN mg/dL  --   --  0.51   ALK PHOS U/L  --   --  46   ALT U/L  --   --  3*   AST U/L  --   --  11*   GLUCOSE RANDOM mg/dL 218*   < > 150*    < > = values in this interval not displayed.     Results from last 7 days   Lab Units 02/04/24  0440   INR  1.03     Results from last 7 days   Lab Units 02/08/24  1115 02/08/24  0541 02/07/24  2050 02/07/24  1534 02/07/24  1032 02/07/24  0612 02/06/24  2102 02/06/24  1551 02/06/24  1127 02/06/24  0547 02/05/24  2107 02/05/24  1539   POC GLUCOSE mg/dl 145* 162* 161* 93 145* 169* 139 112 155* 130 153* 127         Results from last 7 days   Lab Units 02/03/24  0557   PROCALCITONIN ng/ml 0.06       Lines/Drains:  Invasive Devices       Drain  Duration             Ureteral Internal Stent Left ureter 6 Fr. 80 days                          Imaging: No pertinent imaging reviewed.    Recent Cultures (last 7 days):         Last 24 Hours Medication List:   Current Facility-Administered Medications   Medication Dose Route Frequency Provider Last Rate    acetaminophen  975 mg Oral Q8H JILLIAN Narinder Street, DO      albuterol  2 puff Inhalation Q6H PRN Narinder Street, DO      AMILoride  5 mg Oral Daily Narinder Street, DO      aspirin  81 mg Oral Daily Narinder Street, DO      atorvastatin  10 mg Oral Daily With Dinner Narinder Street, DO      bisacodyl  10 mg Rectal Daily Ofe Wood MD      buPROPion  300 mg Oral Daily Narinder Street, DO      clonazePAM  1 mg Oral QPM Narinder P Allsbrook, DO      fish oil  1,000 mg Oral Daily Narinder P [place]brook, DO      fluticasone-vilanterol  1 puff Inhalation Daily Narinder P [place]brook, DO      heparin (porcine)   5,000 Units Subcutaneous Q8H [facility] Ofe Wood MD      HYDROmorphone  0.2 mg Intravenous Q4H PRN Narinder P Zoltansjordan, DO      insulin lispro  1-5 Units Subcutaneous TID AC Narinder P Zoltansjordan, DO      insulin lispro  1-5 Units Subcutaneous HS Narinder P Zoltansjordan, DO      insulin lispro  5 Units Subcutaneous TID With Meals Narinder P [place]brook, DO      methocarbamol  250 mg Oral Q8H JILLIAN Narinder P Zoltansjordan, DO      nicotine  1 patch Transdermal Daily Narinder P [place]olt[place]brook, DO      ondansetron  4 mg Intravenous Q6H PRN Narinder P Zoltansjordan, DO      oxybutynin  5 mg Oral Daily Narinder P Jad, DO      oxyCODONE  2.5 mg Oral Q4H PRN Narinder P [place]oltansbrook, DO      Or    oxyCODONE  5 mg Oral Q4H PRN Narinder P [place]olt[place]brook, DO      pantoprazole  40 mg Oral Early Morning Narinderasif Street, DO      polyethylene glycol  17 g Oral Daily Ofe Wood MD      pregabalin  200 mg Oral TID Narinder Street, DO      senna  2 tablet Oral HS Ofe Wood MD          Today, Patient Was Seen By: Ofe Wood MD    **Please Note: This note may have been constructed using a voice recognition system.**

## 2024-02-08 NOTE — PROGRESS NOTES
PHYSICAL MEDICINE AND REHABILITATION   PREADMISSION ASSESSMENT     Projected IGC and Rehabilitation Diagnoses:  Impairment of mobility, safety and Activities of Daily Living (ADLs) due to Other Disabling Impairments:  13  Other Disabling Impairments  Etiologic: Diabetic right foot ulcer with osteomyelitis s/p fem bypass, right foot wound and achilles debridement with 2nd toe partial amputation and VAC placement   Date of Onset: 2/26/24   Date of surgery: 1/31, 2/4    PATIENT INFORMATION  Name: Lona Cedeno Phone #: [phone] (home)   Address: [address] [place] [address] [address] [id number]  YOB: 1945 Age: 78 y.o. #   Marital Status: /Civil Union  Ethnicity: White  Employment Status: retired  Extended Emergency Contact Information  Primary Emergency Contact: Carmelo Cedeno   [facility]  Home Phone: [phone]  Relation: Spouse  Secondary Emergency Contact: HumeraceciliaAriela  Home Phone: [phone]  Mobile Phone: [phone]  Relation: Daughter  Advance Directive: Level 1 Full Code (has ACP docs)    INSURANCE/COVERAGE:     Primary Payor: [employer] REP / Plan: [employer]  REP / Product Type: Medicare PPO /   Secondary Payer: Self Pay   Payer Contact:  Payer Contact:   Contact Phone:  Contact Phone:     Called in by Rep:christian ROCHA#  [phone]  Authorization received for: Acute Rehab  Facility: [facility]    Authorization #:[id number]  Start of Care:2/9  Next Review Date:2 business days   Continued Stay Care Coordinator: christian ROCHA#: [phone]  Submit next review to: fax [phone]          MEDICARE #: [id number]   Medical Record #: [id number]    REFERRAL SOURCE:   Referring provider: Ofe Wood MD  Referring facility: [facility]  Room: [facility] 503/[facility] 503-01  PCP: Vivek Preston DO PCP phone number: [phone]    MEDICAL INFORMATION  HPI: Pt is a 78 year old female with a PMH of PAD, COPD, neuropathy, HTN,  HLD, tobacco abuse, anxiety, depression, bladder neoplasm and diabetes who presented to the [facility] on 1/23/2024 from podiatry office with diabetic ulcer of the right toe and right ankle wound with achilles tendon exposed. She was found to have osteomyelitis of the right foot and was transferred to [place] for possible bypass. Pt was recommended to undergo extra-anatomic bypass right axillary to femoral bypass and right BKA but patient elected to try further interventions to salvage her right foot and plastics was consulted. She underwent fem bypass on 1/31 and right foot wound and achilles debridement with 2nd toe partial amputation and VAC placement. She will likely undergo STSG for skin graft substitute with plastics. Her hospital course was complicated by right chest wall hematoma and she underwent evacuation with vascular on 2/4/2024. Podiatry following: VAC changed on 2/8. Plan to continue negative pressure wound VAC therapy at this time with next change on 2/10/2024.  Discussed with podiatry and patient will not need further OR interventions during this admission.  She will be discharged on wound VAC with follow-up with wound care and evaluation in the outpatient setting for possible split-thickness skin graft down the line.     PM&R consult: patient is a candidate for acute inpatient rehabilitation once medically cleared and surgical intervention are complete for this admission. PT and OT have been consulted and are recommending post-acute rehab services.   Patient's case has been reviewed with [facility] medical director, patient meets medical criteria for acute rehab and has demonstrated the ability to tolerate three or more hours of therapy per day. Patient is medically stable and ready for discharge to [facility].        Past Medical History:   Past Surgical History:   Allergies:     Past Medical History:   Diagnosis Date    Anxiety     Closed fracture of coccyx (HCC) 05/24/2023     Diabetes mellitus (HCC)     GERD (gastroesophageal reflux disease)     Hyperlipidemia     Hypertension     IBS (irritable bowel syndrome)     Shoulder fracture     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;  Surgeon: Cristian Child MD;  Location: [facility];  Service: Urology    EVACUATION OF HEMATOMA Right 2024    Procedure: EVACUATION/ DRAINAGE CHEST WALL  HEMATOMA;  Surgeon: Seth Hoyos MD;  Location: [facility];  Service: Vascular    FL RETROGRADE PYELOGRAM  2023    HERNIA REPAIR      umbilicus w/ mesh    FL BYPASS W/VEIN AXILLARY-FEMORAL Right 2024    Procedure: BYPASS AXILLO-FEMORAL, RIGHT WITH 8MM RINGED PTFE GRAFT;  Surgeon: Seth Hoyos MD;  Location: [facility];  Service: Vascular    WOUND DEBRIDEMENT Right 2024    Procedure: RIGHT WOUND AND ACHILLES DEBRIDEMENT FOOT/TOE (WASH OUT); PARTIAL AMPUTATION DISTAL 2ND TOE;  Surgeon: Aaron Montero DPM;  Location: [facility];  Service: Podiatry     Allergies   Allergen Reactions    Paroxetine Other (See Comments)     Became suicidal    Adhesive [Medical Tape] Itching and Blisters    Carafate [Sucralfate] Other (See Comments)     Mouth sores, tongue sore    Sulfa Antibiotics Hives and Rash    Sulfamethoxazole-Trimethoprim Hives         Medical/functional conditions requiring inpatient rehabilitation: Diabetic right foot ulcer with osteomyelitis s/p fem bypass, right foot wound and achilles debridement with 2nd toe partial amputation and VAC  placement , Impaired mobility and self care, Decreased strength/endurance    Risk for medical/clinical complications: Risk for falls, Risk for skin breakdown secondary to decreased mobility, Risk for uncontrolled pain, Risk for infection or dehiscence    Comorbidities:  Right chest wall hematoma s/p evacuation   Constipation, agree with starting miralax in addition for senna  Tobacco use  COPD  PAD  Acute pain due to surgery  HTN  Anxiety/depression   Diabetes type 2   DVT ppx: SCD     Surgeries in the last 100 days:   right axillofemoral bypass with vascular surgery on 1/31/24   Right chest wall hematoma evacuation, right foot wound and achilles debridement with 2nd toe partial amputation, VAC placement  on 2/4    CURRENT VITAL SIGNS:   Temp:  [97.8 °F (36.6 °C)-98.9 °F (37.2 °C)] 98 °F (36.7 °C)  HR:  [73-83] 73  Resp:  [16-17] 16  BP: (125-169)/() 125/56   Intake/Output Summary (Last 24 hours) at 2/9/2024 1104  Last data filed at 2/9/2024 0815  Gross per 24 hour   Intake 1140 ml   Output 1550 ml   Net -410 ml        LABORATORY RESULTS:      Lab Results   Component Value Date    HGB 10.3 (L) 02/09/2024    HGB 16.8 (H) 05/21/2018    HCT 32.3 (L) 02/09/2024    HCT 50.1 (H) 05/21/2018    WBC 9.90 02/09/2024    WBC 9.0 05/21/2018     Lab Results   Component Value Date    BUN 14 02/09/2024    BUN 15 05/21/2018     05/21/2018    K 4.4 02/09/2024    K 4.0 05/21/2018     02/09/2024    CL 95 (L) 05/21/2018    GLUCOSE 143 (H) 01/31/2024    CREATININE 0.59 (L) 02/09/2024    CREATININE 0.62 05/21/2018     Lab Results   Component Value Date    PROTIME 13.4 02/04/2024    INR 1.03 02/04/2024        DIAGNOSTIC STUDIES:  No results found.    PRECAUTIONS/SPECIAL NEEDS:  Weight Bearing Precautions:  NWB R TISHA with wound VAC and prevlon boot, Anticoagulation:  aspirin 81 mg orally every day and heparin, Edema Management, Safety Concerns, Pain Management, Bladder Incontinence: # of accidents 9, Dietary Restrictions:  Cristian/CHO, Hard of Hearing, and Language Preference: English, Fall precautions     MEDICATIONS:     Current Facility-Administered Medications:     acetaminophen (TYLENOL) tablet 975 mg, 975 mg, Oral, Q8H JILLIAN, Narinder P Allsbrook, DO, 975 mg at 02/09/24 0544    albuterol (PROVENTIL HFA,VENTOLIN HFA) inhaler 2 puff, 2 puff, Inhalation, Q6H PRN, Narinder P Allsbrook, DO    AMILoride tablet 5 mg, 5 mg, Oral, Daily, Narinder P Allsbrook, DO, 5 mg at 02/09/24 0804    aspirin (ECOTRIN LOW STRENGTH) EC tablet 81 mg, 81 mg, Oral, Daily, Narinder P Allsbrook, DO, 81 mg at 02/09/24 0804    atorvastatin (LIPITOR) tablet 10 mg, 10 mg, Oral, Daily With Dinner, Narinder P Allsbrook, DO, 10 mg at 02/08/24 1702    bisacodyl (DULCOLAX) rectal suppository 10 mg, 10 mg, Rectal, Daily, Ofe Wood MD    buPROPion (WELLBUTRIN XL) 24 hr tablet 300 mg, 300 mg, Oral, Daily, Narinder P Allsbrook, DO, 300 mg at 02/09/24 0804    clonazePAM (KlonoPIN) tablet 1 mg, 1 mg, Oral, QPM, Narinder P Allsbrook, DO, 1 mg at 02/08/24 1702    docusate sodium (COLACE) capsule 100 mg, 100 mg, Oral, BID, Clara Gann, DO, 100 mg at 02/09/24 0805    fish oil capsule 1,000 mg, 1,000 mg, Oral, Daily, Narinder P Allsbrook, DO, 1,000 mg at 02/09/24 0805    fluticasone-vilanterol 200-25 mcg/actuation 1 puff, 1 puff, Inhalation, Daily, Narinder P Allsbrook, DO, 1 puff at 02/09/24 0747    HYDROmorphone HCl (DILAUDID) injection 0.2 mg, 0.2 mg, Intravenous, Q4H PRN, Narinder P Allsbrook, DO, 0.2 mg at 02/01/24 1214    insulin lispro (HumaLOG) 100 units/mL subcutaneous injection 1-5 Units, 1-5 Units, Subcutaneous, TID AC, 1 Units at 02/09/24 0746 **AND** Fingerstick Glucose (POCT), , , TID AC, Narinder Street,     insulin lispro (HumaLOG) 100 units/mL subcutaneous injection 1-5 Units, 1-5 Units, Subcutaneous, HS, Narinder Street, , 1 Units at 02/07/24 2204    insulin lispro (HumaLOG) 100 units/mL subcutaneous injection 5 Units, 5 Units, Subcutaneous, TID  With Meals, Narinder P Allsbrook, DO, 5 Units at 02/09/24 0747    methocarbamol (ROBAXIN) tablet 250 mg, 250 mg, Oral, Q8H JILLIAN, Narinder P Allsbrook, DO, 250 mg at 02/09/24 0544    nicotine (NICODERM CQ) 21 mg/24 hr TD 24 hr patch 1 patch, 1 patch, Transdermal, Daily, Narinder P Allsbrook, DO, 1 patch at 02/09/24 0810    ondansetron (ZOFRAN) injection 4 mg, 4 mg, Intravenous, Q6H PRN, Narinder P Allsbrook, DO    oxybutynin (DITROPAN-XL) 24 hr tablet 5 mg, 5 mg, Oral, Daily, Narinder P Allsbrook, DO, 5 mg at 02/09/24 0804    oxyCODONE (ROXICODONE) split tablet 2.5 mg, 2.5 mg, Oral, Q4H PRN, 2.5 mg at 01/31/24 2136 **OR** oxyCODONE (ROXICODONE) IR tablet 5 mg, 5 mg, Oral, Q4H PRN, Narinder P Allsbrook, DO, 5 mg at 02/07/24 2202    pantoprazole (PROTONIX) EC tablet 40 mg, 40 mg, Oral, Early Morning, Narinder P Allsbrook, DO, 40 mg at 02/09/24 0747    polyethylene glycol (MIRALAX) packet 17 g, 17 g, Oral, Daily, Ofe Wood MD, 17 g at 02/08/24 0855    pregabalin (LYRICA) capsule 200 mg, 200 mg, Oral, TID, Narinder P Allsbrook, DO, 200 mg at 02/09/24 0804    rivaroxaban (XARELTO) tablet 2.5 mg, 2.5 mg, Oral, BID With Meals, Ofe Wood MD, 2.5 mg at 02/09/24 0747    senna (SENOKOT) tablet 17.2 mg, 2 tablet, Oral, HS, Ofe Wood MD, 17.2 mg at 02/08/24 2112    SKIN INTEGRITY:   Wound 01/09/24 Arterial Ulcer Ankle Medial;Right  Wound 01/09/24 Arterial Ulcer Ankle Lateral;Right  Wound 01/09/24 Arterial Ulcer Ankle Right;Posterior  Wound 01/09/24 Arterial Ulcer Ankle Left;Lateral  Wound 01/23/24 Diabetic Ulcer Toe D3, third Anterior;Right  Wound 01/23/24 Pressure Injury Toe D5, fifth Anterior;Right  Wound 02/04/24 Toe D2, second Right  Wound 02/04/24 Chest Right    PRIOR LEVEL OF FUNCTION:  She lives in a(n) single family home  Lona Cedeno is  and lives with their family.  Self Care: Independent, Indoor Mobility: Independent, Stairs (in/outdoor): Independent, and Cognition: Independent    FALLS IN THE  LAST 6 MONTHS: 0    HOME ENVIRONMENT:  The living area: can live on one level  There are 5 steps to enter the home.    The patient will not have 24 hour supervision/physical assistance available upon discharge.    PREVIOUS DME:  Equipment in home (previous DME): Shower Chair and Grab Bars    FUNCTIONAL STATUS:  Physical Therapy Occupational Therapy Speech Therapy   02/07/24 1029    PT Last Visit   PT Visit Date 02/07/24   Note Type   Note Type Treatment   Pain Assessment   Pain Assessment Tool 0-10   Pain Score No Pain   Restrictions/Precautions   Weight Bearing Precautions Per Order Yes   RLE Weight Bearing Per Order NWB   Other Precautions WBS;Fall Risk;Telemetry  (wound vac)   General   Chart Reviewed Yes   Family/Caregiver Present No   Cognition   Overall Cognitive Status WFL   Arousal/Participation Alert;Cooperative   Attention Within functional limits   Orientation Level Oriented X4   Memory Decreased recall of precautions   Following Commands Follows one step commands without difficulty   Comments Patient is pleasant and cooperative   Subjective   Subjective Patient agreeable to PT tx   Bed Mobility   Additional Comments sitting EOB on arrival   Transfers   Sit to Stand 4  Minimal assistance   Additional items Assist x 1;Increased time required;Verbal cues   Stand to Sit 4  Minimal assistance   Additional items Assist x 1;Increased time required;Verbal cues   Stand pivot 4  Minimal assistance   Additional items Assist x 1;Increased time required;Verbal cues   Toilet transfer 4  Minimal assistance   Additional items Assist x 1;Increased time required;Verbal cues;Commode   Additional Comments RW - SPT x4 (bed>chair>BSC>chair>bed) - STS x5   Ambulation/Elevation   Gait pattern    (hop to gait pattern)   Gait Assistance 4  Minimal assist   Additional items Assist x 1;Verbal cues   Assistive Device Rolling walker   Distance 2'x3   Balance   Static Sitting Normal   Dynamic Sitting Good   Static Standing Fair -    Dynamic Standing Poor +   Ambulatory Poor +   Endurance Deficit   Endurance Deficit Yes   Endurance Deficit Description fatigue, weakness   Activity Tolerance   Activity Tolerance Patient tolerated treatment well;Patient limited by fatigue   Nurse Made Aware RN cleared   Exercises   Glute Sets Sitting;15 reps;AROM   Hip Flexion Sitting;15 reps;AROM   Hip Abduction Sitting;15 reps;AROM   Hip Adduction Sitting;15 reps;AROM   Knee AROM Long Arc Quad Sitting;15 reps;AROM   Ankle Pumps Sitting;15 reps;AROM   Assessment   Prognosis Fair   Problem List Decreased strength;Decreased endurance;Impaired balance;Decreased mobility;Pain;Decreased skin integrity;Impaired judgement   Assessment Patient received sitting edge of bed. Patient agreeable to therapy. Patient performs functional transfers with minimal assistance x1 using rolling walker. Patient performs x4 SPT + x5 STS throughout session. Patient performs toilet transfer to [facility] with min A using RW. Patient performs ambulation with minimal assistance x1 using rolling walker, 2'x3. Patient performs therapeutic exercises as noted in flowsheet. Good tolerance to activity. Educated to perform 10-20 repetitions 3x/day for continued strengthening and endurance. Patient left sitting edge of bed with all needs met and call bell/personal items within reach. The patient's AM-PAC Basic Mobility Inpatient Short Form Raw Score is 15 showing further need for skilled PT services in order to improve functional mobility, decrease need for assistance, and return to [facility]. PT is recommending Level 2 - Moderate Resource Intensity on d/c from hospital.  Will continue to follow as able.    02/08/24 [facility]    OT Last Visit   OT Visit Date 02/08/24   Note Type   Note Type Treatment for insurance authorization   Pain Assessment   Pain Assessment Tool 0-10   Pain Score No Pain   Restrictions/Precautions   Weight Bearing Precautions Per Order Yes   RLE Weight Bearing Per Order NWB   Braces or  Orthoses    (prevlon boot RLE)   Other Precautions WBS;Cognitive;Chair Alarm;Bed Alarm;Fall Risk;Pain   ADL   Where Assessed Edge of bed   UB Bathing Assistance 4  Minimal Assistance   UB Bathing Deficit Setup;Increased time to complete;Right arm;Left arm;Abdomen   LB Bathing Assistance 3  Moderate Assistance   LB Bathing Deficit Right upper leg;Left upper leg;Left lower leg including foot;Buttocks   LB Bathing Comments mod A for lower LE, able to manage upper LE's   UB Dressing Assistance 4  Minimal Assistance   UB Dressing Deficit Thread RUE;Thread LUE   UB Dressing Comments min A threading RUE, 2' previous shoulder replacement.   LB Dressing Assistance 4  Minimal Assistance   LB Dressing Deficit Don/doff L sock   LB Dressing Comments pt able to apollo L sock by bringing L foot up to R knee. she requires A for threading LE's into underwear, is able to pull them up in stance, but requires assistance to stabilize while doing so.   Functional Standing Tolerance   Time 3 min   Activity standing for LB dressing and inc standing balance during fxnl activity.   Comments pt requires min-mod A to maintain upright posture if reaching outside of  DIONICIO.   Transfers   Sit to Stand 4  Minimal assistance   Additional items Assist x 1;Increased time required;Verbal cues   Stand to Sit 4  Minimal assistance   Additional items Assist x 1;Increased time required;Verbal cues   Additional Comments rw, continues to require vc for adherence to WBS   Functional Mobility   Functional Mobility 4  Minimal assistance   Additional Comments ax1, small hops from EOB>chair. max vc for adherence to wbs.   Additional items Rolling walker   Cognition   Overall Cognitive Status Impaired   Arousal/Participation Alert;Responsive   Attention Attends with cues to redirect   Orientation Level Oriented X4   Memory Decreased recall of precautions   Following Commands Follows one step commands with increased time or repetition   Comments requires inc time to  follow one step commands, has overall reduced safety awareness and insight to condition. also requires inc time to process commands during session.   Activity Tolerance   Activity Tolerance Patient limited by fatigue   Medical Staff Made Aware ok per RN   Assessment   Assessment Pt seen on this date for OT session focusing on ADL retraining, cognitive reorientation, body mechanics, transfer retraining, increasing activity tolerance/endurance and EOB sitting to increase ability to participate in ADL/functional tasks. Pt was found in bed and was left in bed w/ all needs within reach, bed alarm on. Pt completed transfers and FM w min Ax1 c rw, hops to and from bedside chair. UB ADL w min A, LB ADL w mod A. Engaged in fxnl standing tolerance activity, which included reaching outside of DIONICIO. She requires mod A to maintain upright posture when reaching outside of DIONICIO. Pt requires inc time to follow one step commands, decreased processing speeds also noted. Pt w/ improvements in activity tolerance, transfer ability, adl task completion however is still limited 2* decreased ADL/High-level ADL status, decreased activity tolerance/endurance, decreased cognition, decreased self-care trans, decreased safety awareness and insight to condition.   The patient's raw score on the AM-PAC Daily Activity Inpatient Short Form is 15. A raw score of less than 19 suggests the patient may benefit from discharge to post-acute rehabilitation services. Please refer to the recommendation of the Occupational Therapist for safe discharge planning.  Recommending pt D/C to [facility] when medically stable. Pt will continue to benefit from acute OT services to meet goals.         CARE SCORES:  Self Care:  Eatin: Independent  Oral hygiene: 03: Partial/moderate assistance  Toilet hygiene: 03: Partial/moderate assistance  Shower/bathing self: 03: Partial/moderate assistance  Upper body dressin: Partial/moderate assistance  Lower body dressin:  Partial/moderate assistance  Putting on/taking off footwear: 03: Partial/moderate assistance  Transfers:  Roll left and right: 04: Supervision or touching  assistance  Sit to lyin: Partial/moderate assistance  Lying to sitting on side of bed: 04: Supervision or touching  assistance  Sit to stand: 03: Partial/moderate assistance  Chair/bed to chair transfer: 03: Partial/moderate assistance  Toilet transfer: 03: Partial/moderate assistance  Mobility:(Min A RW 2'x3)  Walk 10 ft: 88: Not attempted due to medical conditions or safety concerns  Walk 50 ft with two turns: 88: Not attempted due to medical conditions or safety concerns  Walk 150ft: 88: Not attempted due to medical conditions or safety concerns    CURRENT GAP IN FUNCTION  Prior to Admission: Functional Status: Patient was independent with mobility/ambulation, transfers, ADL's, IADL's.    Expected functional outcomes: It is expected that with skilled acute rehabilitation services the patient will progress to Independent for self care and Independent for mobility     Estimated length of stay: 10 to 14 days    Anticipated Post-Discharge Disposition/Treatment  Disposition: Return to previous home/apartment.  Outpatient Services: Physical Therapy (PT) and Occupational Therapy (OT)    BARRIERS TO DISCHARGE  Weakness, Pain, Balance Difficulty, Fatigue, Home Accessibility, Caregiver Accessibility, and Equipment Needs    INTERVENTIONS FOR DISCHARGE  Adaptive equipment, Patient/Family/Caregiver Education, Community Resources, Support Group, Arrange DME needs, Therapy exercises, and Energy conservation education     REQUIRED THERAPY:  Patient will require PT and OT 90 minutes each per day, five days per week to achieve rehab goals.     REQUIRED FUNCTIONAL AND MEDICAL MANAGEMENT FOR INPATIENT REHABILITATION:  Skin:  Monitor skin for breakdown, Pain Management: Overall pain is moderately controlled, Deep Vein Thrombosis (DVT) Prophylaxis:  Per MD orders , further  internal medicine management of additional medical conditions while on ARC, PT/OT intervention, patient/family education and training, and any needed consults PRN.    RECOMMENDED LEVEL OF CARE:    Pt is a 78 year old female with a PMH of PAD, COPD, neuropathy, HTN, HLD, tobacco abuse, anxiety, depression, bladder neoplasm and diabetes who [facility] on 1/23/2024 from podiatry office with diabetic ulcer of the right toe and right ankle wound with achilles tendon exposed. She was found to have osteomyelitis of the right foot and was transferred to [place] for possible bypass. Pt was recommended to undergo extra-anatomic bypass right axillary to femoral bypass and right BKA but patient elected to try further interventions to salvage her right foot and plastics was consulted. She underwent fem bypass on 1/31 and right foot wound and achilles debridement with 2nd toe partial amputation and VAC placement. She will likely undergo STSG for skin graft substitute with plastics. Her hospital course was complicated by right chest wall hematoma and she underwent evacuation with vascular on 2/4/2024. Podiatry following: VAC changed on 2/8. Plan to continue negative pressure wound VAC therapy at this time with next change on 2/10/2024.  Discussed with podiatry and patient will not need further OR interventions during this admission.  She will be discharged on wound VAC with follow-up with wound care and evaluation in the outpatient setting for possible split-thickness skin graft down the line.   Pt was independent PTA with transfers, amb, and ADLs. Pt lives with spouse and daughter in a [place] home with 5 PATO. Pt is currently functioning below baseline needing Mod A for ADLs and Min A for transfers/amb. Pt would benefit from ARC admission to have close medical management while participating in 3 hours of therapy per day that will include physical and occupational therapy. Physical  and occupational therapists will address functional mobility deficits and assist patient in improving their strength, endurance, ROM, and self care. Pt will have 24/7 nursing care to monitor routine vitals, I/Os, skin integrity, and overall condition. The rehab nursing staff will follow therapy recommendations to have 24 hour follow through during non-therapy hours. PM&R to maximize function and provide medical oversight. The MD will monitor patient co-morbidities while on the unit as well as order any additional tests, labs, and consults needed. The [facility] specialized interdisciplinary team will meet weekly to discuss patient overall medical status and rehab goals in preparation for D/C home. Inpatient acute rehab is recommended for patient to maximize overall strength, endurance, self care, and mobility for a safe and timely transition back home.

## 2024-02-08 NOTE — ASSESSMENT & PLAN NOTE
Lab Results   Component Value Date    HGBA1C 6.5 (H) 11/07/2023       Recent Labs     02/07/24  1534 02/07/24 2050 02/08/24  0541 02/08/24  1115   POCGLU 93 161* 162* 145*         Blood Sugar Average: Last 72 hrs:  (P) [id number]  Outpatient regimen metformin 1000 mg twice daily and Januvia, holding  Continue Humalog 3 times daily and insulin sliding scale  Continue Lyrica  Accu-Cheks reviewed.  Continue

## 2024-02-08 NOTE — ASSESSMENT & PLAN NOTE
Transferred to [facility] for vascular eval/ongoing podiatry eval   S/p right Ax to fem bypass on 1/31  S/p right foot wound and Achilles debridement with second toe partial amputation and VAC placement   Discussed with vascular surgery and no further vascular surgery intervention planned  Reviewed plastic surgery evaluation unfortunately patient is not a candidate for free muscle flap at this time and will benefit from STSG for skin graft substitute  Monitoring off antibiotics [place] [facility]  Discussed with podiatry and patient will not need further OR interventions during this admission.  She will be discharged on wound VAC with follow-up with wound care and evaluation in the outpatient setting for possible split-thickness skin graft down the line.  PT/OT evaluation with level 2 recommendation. PM&R accepted to [facility], appreciate recommendation  NWB on right foot. Fall precautions

## 2024-02-08 NOTE — PLAN OF CARE
Problem: OCCUPATIONAL THERAPY ADULT  Goal: Performs self-care activities at highest level of function for planned discharge setting.  See evaluation for individualized goals.  Description: Treatment Interventions: ADL retraining, Functional transfer training, Endurance training, UE strengthening/ROM, Patient/family training, Equipment evaluation/education, Compensatory technique education, Continued evaluation, Energy conservation, Activityengagement          See flowsheet documentation for full assessment, interventions and recommendations.   Outcome: Progressing  Note: Limitation: Decreased ADL status, Decreased Safe judgement during ADL, Decreased endurance, Decreased self-care trans, Decreased high-level ADLs  Prognosis: Good  Assessment: Pt seen on this date for OT session focusing on ADL retraining, cognitive reorientation, body mechanics, transfer retraining, increasing activity tolerance/endurance and EOB sitting to increase ability to participate in ADL/functional tasks. Pt was found in bed and was left in bed w/ all needs within reach, bed alarm on. Pt completed transfers and FM w min Ax1 c rw, hops to and from bedside chair. UB ADL w min A, LB ADL w mod A. Engaged in fxnl standing tolerance activity, which included reaching outside of DIONICIO. She requires mod A to maintain upright posture when reaching outside of DIONICIO. Pt requires inc time to follow one step commands, decreased processing speeds also noted. Pt w/ improvements in activity tolerance, transfer ability, adl task completion however is still limited 2* decreased ADL/High-level ADL status, decreased activity tolerance/endurance, decreased cognition, decreased self-care trans, decreased safety awareness and insight to condition.   The patient's raw score on the AM-PAC Daily Activity Inpatient Short Form is 15. A raw score of less than 19 suggests the patient may benefit from discharge to post-acute rehabilitation services. Please refer to the  recommendation of the Occupational Therapist for safe discharge planning.  Recommending pt D/C to [facility] when medically stable. Pt will continue to benefit from acute OT services to meet goals.     Rehab Resource Intensity Level, OT: I (Maximum Resource Intensity)

## 2024-02-08 NOTE — CASE MANAGEMENT
[facility] received request for authorization from Care Manager.  Authorization request for: Acute Rehab  Facility Name: [facility]   NPI:[id number]  Facility MD: Dr De Padua    NPI: [id number]  Authorization initiated by contacting insurance:  [employer]   Via: Fax  Clinicals submitted via: fax # [phone]

## 2024-02-08 NOTE — PLAN OF CARE
Problem: Prexisting or High Potential for Compromised Skin Integrity  Goal: Skin integrity is maintained or improved  Description: INTERVENTIONS:  - Identify patients at risk for skin breakdown  - Assess and monitor skin integrity  - Assess and monitor nutrition and hydration status  - Monitor labs   - Assess for incontinence   - Turn and reposition patient  - Assist with mobility/ambulation  - Relieve pressure over bony prominences  - Avoid friction and shearing  - Provide appropriate hygiene as needed including keeping skin clean and dry  - Evaluate need for skin moisturizer/barrier cream  - Collaborate with interdisciplinary team   - Patient/family teaching  - Consider wound care consult   Outcome: Progressing     Problem: PAIN - ADULT  Goal: Verbalizes/displays adequate comfort level or baseline comfort level  Description: Interventions:  - Encourage patient to monitor pain and request assistance  - Assess pain using appropriate pain scale  - Administer analgesics based on type and severity of pain and evaluate response  - Implement non-pharmacological measures as appropriate and evaluate response  - Consider cultural and social influences on pain and pain management  - Notify physician/advanced practitioner if interventions unsuccessful or patient reports new pain  Outcome: Progressing     Problem: INFECTION - ADULT  Goal: Absence or prevention of progression during hospitalization  Description: INTERVENTIONS:  - Assess and monitor for signs and symptoms of infection  - Monitor lab/diagnostic results  - Monitor all insertion sites, i.e. indwelling lines, tubes, and drains  - Monitor endotracheal if appropriate and nasal secretions for changes in amount and color  - [place] appropriate cooling/warming therapies per order  - Administer medications as ordered  - Instruct and encourage patient and family to use good hand hygiene technique  - Identify and instruct in appropriate isolation precautions for  identified infection/condition  Outcome: Progressing  Goal: Absence of fever/infection during neutropenic period  Description: INTERVENTIONS:  - Monitor WBC    Outcome: Progressing     Problem: SAFETY ADULT  Goal: Patient will remain free of falls  Description: INTERVENTIONS:  - Educate patient/family on patient safety including physical limitations  - Instruct patient to call for assistance with activity   - Consult OT/PT to assist with strengthening/mobility   - Keep Call bell within reach  - Keep bed low and locked with side rails adjusted as appropriate  - Keep care items and personal belongings within reach  - Initiate and maintain comfort rounds  - Make Fall Risk Sign visible to staff  - Apply yellow socks and bracelet for high fall risk patients  - Consider moving patient to room near nurses station  Outcome: Progressing  Goal: Maintain or return to baseline ADL function  Description: INTERVENTIONS:  -  Assess patient's ability to carry out ADLs; assess patient's baseline for ADL function and identify physical deficits which impact ability to perform ADLs (bathing, care of mouth/teeth, toileting, grooming, dressing, etc.)  - Assess/evaluate cause of self-care deficits   - Assess range of motion  - Assess patient's mobility; develop plan if impaired  - Assess patient's need for assistive devices and provide as appropriate  - Encourage maximum independence but intervene and supervise when necessary  - Involve family in performance of ADLs  - Assess for home care needs following discharge   - Consider OT consult to assist with ADL evaluation and planning for discharge  - Provide patient education as appropriate  Outcome: Progressing  Goal: Maintains/Returns to pre admission functional level  Description: INTERVENTIONS:  - Perform AM-PAC 6 Click Basic Mobility/ Daily Activity assessment daily.  - Set and communicate daily mobility goal to care team and patient/family/caregiver.   - Collaborate with rehabilitation  services on mobility goals if consulted  - Perform Range of Motion 2 times a day.  - Reposition patient every 2 hours.  - Dangle patient 2 times a day  - Stand patient 2 times a day  - Ambulate patient 2 times a day  - Out of bed to chair 2 times a day   - Out of bed for meals 2 times a day  - Out of bed for toileting  - Record patient progress and toleration of activity level   Outcome: Progressing     Problem: DISCHARGE PLANNING  Goal: Discharge to home or other facility with appropriate resources  Description: INTERVENTIONS:  - Identify barriers to discharge w/patient and caregiver  - Arrange for needed discharge resources and transportation as appropriate  - Identify discharge learning needs (meds, wound care, etc.)  - Arrange for interpretive services to assist at discharge as needed  - Refer to Case Management Department for coordinating discharge planning if the patient needs post-hospital services based on physician/advanced practitioner order or complex needs related to functional status, cognitive ability, or social support system  Outcome: Progressing     Problem: Knowledge Deficit  Goal: Patient/family/caregiver demonstrates understanding of disease process, treatment plan, medications, and discharge instructions  Description: Complete learning assessment and assess knowledge base.  Interventions:  - Provide teaching at level of understanding  - Provide teaching via preferred learning methods  Outcome: Progressing     Problem: SKIN/TISSUE INTEGRITY - ADULT  Goal: Skin Integrity remains intact(Skin Breakdown Prevention)  Description: Assess:  -Perform Brooks assessment   -Clean and moisturize skin   -Inspect skin when repositioning, toileting, and assisting with ADLS  -Assess under medical devices   -Assess extremities for adequate circulation and sensation     Bed Management:  -Have minimal linens on bed & keep smooth, unwrinkled  -Change linens as needed when moist or perspiring    Toileting:  -Offer  bedside commode  -Assess for incontinence   -Use incontinent care products after each incontinent episode    Skin Care:  -Avoid use of baby powder, tape, friction and shearing, hot water or constrictive clothing  -Relieve pressure over bony prominences   -Do not massage red bony areas    Next Steps:  -Teach patient strategies to minimize risks  -Consider consults to  interdisciplinary teams   Outcome: Progressing  Goal: Incision(s), wounds(s) or drain site(s) healing without S/S of infection  Description: INTERVENTIONS  - Assess and document dressing, incision, wound bed, drain sites and surrounding tissue  - Provide patient and family education  - Perform skin care/dressing changes  Outcome: Progressing

## 2024-02-09 ENCOUNTER — TRANSITIONAL CARE MANAGEMENT (OUTPATIENT)
Dept: FAMILY MEDICINE CLINIC | Facility: CLINIC | Age: 79
End: 2024-02-09

## 2024-02-09 ENCOUNTER — HOSPITAL ENCOUNTER (INPATIENT)
Facility: HOSPITAL | Age: 79
LOS: 4 days | DRG: 559 | End: 2024-02-13
Attending: INTERNAL MEDICINE | Admitting: INTERNAL MEDICINE
Payer: COMMERCIAL

## 2024-02-09 VITALS
OXYGEN SATURATION: 92 % | RESPIRATION RATE: 16 BRPM | HEART RATE: 73 BPM | TEMPERATURE: 98 F | HEIGHT: 61 IN | DIASTOLIC BLOOD PRESSURE: 56 MMHG | BODY MASS INDEX: 27.51 KG/M2 | SYSTOLIC BLOOD PRESSURE: 125 MMHG | WEIGHT: 145.72 LBS

## 2024-02-09 DIAGNOSIS — M86.9 DIABETIC FOOT ULCER WITH OSTEOMYELITIS (HCC): Primary | ICD-10-CM

## 2024-02-09 DIAGNOSIS — E11.621 DIABETIC FOOT ULCER WITH OSTEOMYELITIS (HCC): Primary | ICD-10-CM

## 2024-02-09 DIAGNOSIS — L97.509 DIABETIC FOOT ULCER WITH OSTEOMYELITIS (HCC): Primary | ICD-10-CM

## 2024-02-09 DIAGNOSIS — E11.69 DIABETIC FOOT ULCER WITH OSTEOMYELITIS (HCC): Primary | ICD-10-CM

## 2024-02-09 PROBLEM — Z91.89 AT RISK FOR VENOUS THROMBOEMBOLISM (VTE): Status: ACTIVE | Noted: 2024-02-09

## 2024-02-09 PROBLEM — Z91.89 AT HIGH RISK FOR SKIN BREAKDOWN: Status: ACTIVE | Noted: 2024-02-09

## 2024-02-09 PROBLEM — G89.11 ACUTE PAIN DUE TO INJURY: Status: ACTIVE | Noted: 2024-02-09

## 2024-02-09 PROBLEM — T14.8XXA WOUND OF SKIN: Status: ACTIVE | Noted: 2024-02-09

## 2024-02-09 PROBLEM — Z74.09 IMPAIRED MOBILITY AND ACTIVITIES OF DAILY LIVING: Status: ACTIVE | Noted: 2024-02-09

## 2024-02-09 PROBLEM — Z78.9 IMPAIRED MOBILITY AND ACTIVITIES OF DAILY LIVING: Status: ACTIVE | Noted: 2024-02-09

## 2024-02-09 LAB
ANION GAP SERPL CALCULATED.3IONS-SCNC: 8 MMOL/L
BASOPHILS # BLD AUTO: 0.04 THOUSANDS/ÂΜL (ref 0–0.1)
BASOPHILS NFR BLD AUTO: 0 % (ref 0–1)
BUN SERPL-MCNC: 14 MG/DL (ref 5–25)
CALCIUM SERPL-MCNC: 10 MG/DL (ref 8.4–10.2)
CHLORIDE SERPL-SCNC: 100 MMOL/L (ref 96–108)
CO2 SERPL-SCNC: 30 MMOL/L (ref 21–32)
CREAT SERPL-MCNC: 0.59 MG/DL (ref 0.6–1.3)
EOSINOPHIL # BLD AUTO: 0.77 THOUSAND/ÂΜL (ref 0–0.61)
EOSINOPHIL NFR BLD AUTO: 8 % (ref 0–6)
ERYTHROCYTE [DISTWIDTH] IN BLOOD BY AUTOMATED COUNT: 14.1 % (ref 11.6–15.1)
GFR SERPL CREATININE-BSD FRML MDRD: 88 ML/MIN/1.73SQ M
GLUCOSE SERPL-MCNC: 102 MG/DL (ref 65–140)
GLUCOSE SERPL-MCNC: 123 MG/DL (ref 65–140)
GLUCOSE SERPL-MCNC: 143 MG/DL (ref 65–140)
GLUCOSE SERPL-MCNC: 165 MG/DL (ref 65–140)
GLUCOSE SERPL-MCNC: 210 MG/DL (ref 65–140)
HCT VFR BLD AUTO: 32.3 % (ref 34.8–46.1)
HGB BLD-MCNC: 10.3 G/DL (ref 11.5–15.4)
IMM GRANULOCYTES # BLD AUTO: 0.05 THOUSAND/UL (ref 0–0.2)
IMM GRANULOCYTES NFR BLD AUTO: 1 % (ref 0–2)
LYMPHOCYTES # BLD AUTO: 2.04 THOUSANDS/ÂΜL (ref 0.6–4.47)
LYMPHOCYTES NFR BLD AUTO: 21 % (ref 14–44)
MCH RBC QN AUTO: 30.9 PG (ref 26.8–34.3)
MCHC RBC AUTO-ENTMCNC: 31.9 G/DL (ref 31.4–37.4)
MCV RBC AUTO: 97 FL (ref 82–98)
MONOCYTES # BLD AUTO: 0.76 THOUSAND/ÂΜL (ref 0.17–1.22)
MONOCYTES NFR BLD AUTO: 8 % (ref 4–12)
NEUTROPHILS # BLD AUTO: 6.24 THOUSANDS/ÂΜL (ref 1.85–7.62)
NEUTS SEG NFR BLD AUTO: 62 % (ref 43–75)
NRBC BLD AUTO-RTO: 0 /100 WBCS
PLATELET # BLD AUTO: 367 THOUSANDS/UL (ref 149–390)
PMV BLD AUTO: 9.3 FL (ref 8.9–12.7)
POTASSIUM SERPL-SCNC: 4.4 MMOL/L (ref 3.5–5.3)
RBC # BLD AUTO: 3.33 MILLION/UL (ref 3.81–5.12)
SODIUM SERPL-SCNC: 138 MMOL/L (ref 135–147)
WBC # BLD AUTO: 9.9 THOUSAND/UL (ref 4.31–10.16)

## 2024-02-09 PROCEDURE — 99232 SBSQ HOSP IP/OBS MODERATE 35: CPT | Performed by: STUDENT IN AN ORGANIZED HEALTH CARE EDUCATION/TRAINING PROGRAM

## 2024-02-09 PROCEDURE — NC001 PR NO CHARGE: Performed by: PODIATRIST

## 2024-02-09 PROCEDURE — 82948 REAGENT STRIP/BLOOD GLUCOSE: CPT

## 2024-02-09 PROCEDURE — 85025 COMPLETE CBC W/AUTO DIFF WBC: CPT | Performed by: STUDENT IN AN ORGANIZED HEALTH CARE EDUCATION/TRAINING PROGRAM

## 2024-02-09 PROCEDURE — 97605 NEG PRS WND THER DME<=50SQCM: CPT | Performed by: PODIATRIST

## 2024-02-09 PROCEDURE — 99232 SBSQ HOSP IP/OBS MODERATE 35: CPT | Performed by: PODIATRIST

## 2024-02-09 PROCEDURE — 99222 1ST HOSP IP/OBS MODERATE 55: CPT | Performed by: PHYSICAL MEDICINE & REHABILITATION

## 2024-02-09 PROCEDURE — 80048 BASIC METABOLIC PNL TOTAL CA: CPT | Performed by: STUDENT IN AN ORGANIZED HEALTH CARE EDUCATION/TRAINING PROGRAM

## 2024-02-09 PROCEDURE — 99239 HOSP IP/OBS DSCHRG MGMT >30: CPT | Performed by: STUDENT IN AN ORGANIZED HEALTH CARE EDUCATION/TRAINING PROGRAM

## 2024-02-09 PROCEDURE — 11043 DBRDMT MUSC&/FSCA 1ST 20/<: CPT | Performed by: PODIATRIST

## 2024-02-09 PROCEDURE — 11042 DBRDMT SUBQ TIS 1ST 20SQCM/<: CPT | Performed by: PODIATRIST

## 2024-02-09 PROCEDURE — 99222 1ST HOSP IP/OBS MODERATE 55: CPT | Performed by: INTERNAL MEDICINE

## 2024-02-09 RX ORDER — METHOCARBAMOL 500 MG/1
250 TABLET, FILM COATED ORAL EVERY 8 HOURS SCHEDULED
Status: DISCONTINUED | OUTPATIENT
Start: 2024-02-09 | End: 2024-02-13 | Stop reason: HOSPADM

## 2024-02-09 RX ORDER — POLYETHYLENE GLYCOL 3350 17 G/17G
17 POWDER, FOR SOLUTION ORAL DAILY
Status: DISCONTINUED | OUTPATIENT
Start: 2024-02-10 | End: 2024-02-13 | Stop reason: HOSPADM

## 2024-02-09 RX ORDER — NICOTINE 21 MG/24HR
1 PATCH, TRANSDERMAL 24 HOURS TRANSDERMAL DAILY
Status: ON HOLD
Start: 2024-02-10

## 2024-02-09 RX ORDER — INSULIN LISPRO 100 [IU]/ML
1-5 INJECTION, SOLUTION INTRAVENOUS; SUBCUTANEOUS
Status: DISCONTINUED | OUTPATIENT
Start: 2024-02-09 | End: 2024-02-13 | Stop reason: HOSPADM

## 2024-02-09 RX ORDER — SENNOSIDES 8.6 MG
2 TABLET ORAL
Status: DISCONTINUED | OUTPATIENT
Start: 2024-02-09 | End: 2024-02-13 | Stop reason: HOSPADM

## 2024-02-09 RX ORDER — FLUTICASONE FUROATE AND VILANTEROL 200; 25 UG/1; UG/1
1 POWDER RESPIRATORY (INHALATION)
Status: DISCONTINUED | OUTPATIENT
Start: 2024-02-10 | End: 2024-02-13 | Stop reason: HOSPADM

## 2024-02-09 RX ORDER — CHLORAL HYDRATE 500 MG
1000 CAPSULE ORAL DAILY
Status: DISCONTINUED | OUTPATIENT
Start: 2024-02-10 | End: 2024-02-13 | Stop reason: HOSPADM

## 2024-02-09 RX ORDER — ATORVASTATIN CALCIUM 10 MG/1
10 TABLET, FILM COATED ORAL
Status: DISCONTINUED | OUTPATIENT
Start: 2024-02-09 | End: 2024-02-13 | Stop reason: HOSPADM

## 2024-02-09 RX ORDER — OXYBUTYNIN CHLORIDE 5 MG/1
5 TABLET, EXTENDED RELEASE ORAL DAILY
Status: DISCONTINUED | OUTPATIENT
Start: 2024-02-10 | End: 2024-02-13 | Stop reason: HOSPADM

## 2024-02-09 RX ORDER — BISACODYL 10 MG
10 SUPPOSITORY, RECTAL RECTAL DAILY
Status: DISCONTINUED | OUTPATIENT
Start: 2024-02-10 | End: 2024-02-13 | Stop reason: HOSPADM

## 2024-02-09 RX ORDER — NICOTINE 21 MG/24HR
1 PATCH, TRANSDERMAL 24 HOURS TRANSDERMAL DAILY
Status: DISCONTINUED | OUTPATIENT
Start: 2024-02-10 | End: 2024-02-13 | Stop reason: HOSPADM

## 2024-02-09 RX ORDER — OXYCODONE HYDROCHLORIDE 5 MG/1
5 TABLET ORAL EVERY 4 HOURS PRN
Status: DISCONTINUED | OUTPATIENT
Start: 2024-02-09 | End: 2024-02-13 | Stop reason: HOSPADM

## 2024-02-09 RX ORDER — CLONAZEPAM 1 MG/1
1 TABLET ORAL EVERY EVENING
Status: DISCONTINUED | OUTPATIENT
Start: 2024-02-09 | End: 2024-02-13 | Stop reason: HOSPADM

## 2024-02-09 RX ORDER — ACETAMINOPHEN 325 MG/1
975 TABLET ORAL EVERY 8 HOURS SCHEDULED
Status: DISCONTINUED | OUTPATIENT
Start: 2024-02-09 | End: 2024-02-13 | Stop reason: HOSPADM

## 2024-02-09 RX ORDER — ALBUTEROL SULFATE 90 UG/1
2 AEROSOL, METERED RESPIRATORY (INHALATION) EVERY 6 HOURS PRN
Status: DISCONTINUED | OUTPATIENT
Start: 2024-02-09 | End: 2024-02-13 | Stop reason: HOSPADM

## 2024-02-09 RX ORDER — INSULIN LISPRO 100 [IU]/ML
5 INJECTION, SOLUTION INTRAVENOUS; SUBCUTANEOUS
Status: DISCONTINUED | OUTPATIENT
Start: 2024-02-09 | End: 2024-02-10

## 2024-02-09 RX ORDER — PANTOPRAZOLE SODIUM 40 MG/1
40 TABLET, DELAYED RELEASE ORAL
Status: DISCONTINUED | OUTPATIENT
Start: 2024-02-10 | End: 2024-02-13 | Stop reason: HOSPADM

## 2024-02-09 RX ORDER — AMILORIDE HYDROCHLORIDE 5 MG/1
5 TABLET ORAL DAILY
Status: DISCONTINUED | OUTPATIENT
Start: 2024-02-10 | End: 2024-02-13

## 2024-02-09 RX ORDER — METHOCARBAMOL 500 MG/1
250 TABLET, FILM COATED ORAL EVERY 8 HOURS SCHEDULED
Status: ON HOLD
Start: 2024-02-09

## 2024-02-09 RX ORDER — BUPROPION HYDROCHLORIDE 150 MG/1
300 TABLET ORAL DAILY
Status: DISCONTINUED | OUTPATIENT
Start: 2024-02-10 | End: 2024-02-13 | Stop reason: HOSPADM

## 2024-02-09 RX ORDER — PREGABALIN 100 MG/1
200 CAPSULE ORAL 3 TIMES DAILY
Status: DISCONTINUED | OUTPATIENT
Start: 2024-02-09 | End: 2024-02-13 | Stop reason: HOSPADM

## 2024-02-09 RX ORDER — ONDANSETRON 4 MG/1
4 TABLET, ORALLY DISINTEGRATING ORAL EVERY 6 HOURS PRN
Status: DISCONTINUED | OUTPATIENT
Start: 2024-02-09 | End: 2024-02-13 | Stop reason: HOSPADM

## 2024-02-09 RX ORDER — DOCUSATE SODIUM 100 MG/1
100 CAPSULE, LIQUID FILLED ORAL 2 TIMES DAILY
Status: DISCONTINUED | OUTPATIENT
Start: 2024-02-09 | End: 2024-02-13 | Stop reason: HOSPADM

## 2024-02-09 RX ADMIN — AMILORIDE HYDROCLORIDE 5 MG: 5 TABLET ORAL at 08:04

## 2024-02-09 RX ADMIN — INSULIN LISPRO 5 UNITS: 100 INJECTION, SOLUTION INTRAVENOUS; SUBCUTANEOUS at 11:38

## 2024-02-09 RX ADMIN — PREGABALIN 200 MG: 100 CAPSULE ORAL at 20:54

## 2024-02-09 RX ADMIN — RIVAROXABAN 2.5 MG: 2.5 TABLET, FILM COATED ORAL at 17:01

## 2024-02-09 RX ADMIN — NICOTINE 1 PATCH: 21 PATCH, EXTENDED RELEASE TRANSDERMAL at 08:10

## 2024-02-09 RX ADMIN — PANTOPRAZOLE SODIUM 40 MG: 40 TABLET, DELAYED RELEASE ORAL at 07:47

## 2024-02-09 RX ADMIN — RIVAROXABAN 2.5 MG: 2.5 TABLET, FILM COATED ORAL at 07:47

## 2024-02-09 RX ADMIN — INSULIN LISPRO 1 UNITS: 100 INJECTION, SOLUTION INTRAVENOUS; SUBCUTANEOUS at 22:11

## 2024-02-09 RX ADMIN — BUPROPION HYDROCHLORIDE 300 MG: 150 TABLET, FILM COATED, EXTENDED RELEASE ORAL at 08:04

## 2024-02-09 RX ADMIN — METHOCARBAMOL 250 MG: 500 TABLET ORAL at 13:22

## 2024-02-09 RX ADMIN — DOCUSATE SODIUM 100 MG: 100 CAPSULE, LIQUID FILLED ORAL at 08:05

## 2024-02-09 RX ADMIN — PREGABALIN 200 MG: 100 CAPSULE ORAL at 17:00

## 2024-02-09 RX ADMIN — FLUTICASONE FUROATE AND VILANTEROL TRIFENATATE 1 PUFF: 200; 25 POWDER RESPIRATORY (INHALATION) at 07:47

## 2024-02-09 RX ADMIN — PREGABALIN 200 MG: 100 CAPSULE ORAL at 08:04

## 2024-02-09 RX ADMIN — DOCUSATE SODIUM 100 MG: 100 CAPSULE, LIQUID FILLED ORAL at 17:00

## 2024-02-09 RX ADMIN — OMEGA-3 FATTY ACIDS CAP 1000 MG 1000 MG: 1000 CAP at 08:05

## 2024-02-09 RX ADMIN — ACETAMINOPHEN 975 MG: 325 TABLET, FILM COATED ORAL at 13:22

## 2024-02-09 RX ADMIN — CLONAZEPAM 1 MG: 1 TABLET ORAL at 17:00

## 2024-02-09 RX ADMIN — ATORVASTATIN CALCIUM 10 MG: 10 TABLET, FILM COATED ORAL at 17:00

## 2024-02-09 RX ADMIN — ASPIRIN 81 MG: 81 TABLET, COATED ORAL at 08:04

## 2024-02-09 RX ADMIN — ACETAMINOPHEN 975 MG: 325 TABLET, FILM COATED ORAL at 05:44

## 2024-02-09 RX ADMIN — ACETAMINOPHEN 975 MG: 325 TABLET, FILM COATED ORAL at 20:55

## 2024-02-09 RX ADMIN — INSULIN LISPRO 1 UNITS: 100 INJECTION, SOLUTION INTRAVENOUS; SUBCUTANEOUS at 07:46

## 2024-02-09 RX ADMIN — INSULIN LISPRO 5 UNITS: 100 INJECTION, SOLUTION INTRAVENOUS; SUBCUTANEOUS at 07:47

## 2024-02-09 RX ADMIN — INSULIN LISPRO 5 UNITS: 100 INJECTION, SOLUTION INTRAVENOUS; SUBCUTANEOUS at 16:59

## 2024-02-09 RX ADMIN — OXYBUTYNIN CHLORIDE 5 MG: 5 TABLET, EXTENDED RELEASE ORAL at 08:04

## 2024-02-09 RX ADMIN — METHOCARBAMOL 250 MG: 500 TABLET ORAL at 20:54

## 2024-02-09 RX ADMIN — METHOCARBAMOL 250 MG: 500 TABLET ORAL at 05:44

## 2024-02-09 NOTE — ASSESSMENT & PLAN NOTE
Lab Results   Component Value Date    HGBA1C 6.5 (H) 11/07/2023       Recent Labs     02/08/24  1611 02/08/24 2028 02/09/24  0623 02/09/24  1057   POCGLU 114 136 165* 123       Blood Sugar Average: Last 72 hrs:    Podiatry to follow  Continue VAC dressing.  Follow-up outpt for potential split thickness skin graft.  Observe off abx.

## 2024-02-09 NOTE — CASE MANAGEMENT
received call from christian at [facility] requesting the cm call her back to discuss some things in her clinicals , christian can be reached at [phone]. Ashwin notified

## 2024-02-09 NOTE — CONSULTS
PHYSICAL MEDICINE AND REHABILITATION CONSULT NOTE  Lona Cedeno 78 y.o. female MRN: [id number]  Unit/Bed#: -01 Encounter: [id number]    Requested by (Physician/Service): Charles Hand MD  Reason for Consultation:  Assessment of rehabilitation needs  Reason for Hospitalization:  Foot ulcer, right (HCC) [L97.519]  Rehabilitation Diagnosis: 13 Other Disabling Impairments    History of Present Illness:  Lona Cedeno is a 78 y.o. female who  has a past medical history of Anxiety, Closed fracture of coccyx (HCC) (05/24/2023), Diabetes mellitus (HCC), GERD (gastroesophageal reflux disease), Hyperlipidemia, Hypertension, IBS (irritable bowel syndrome), and Shoulder fracture. who presented to the [facility] on 1/23/2024 from her podiatry office with a diabetic ulcer of the right toe and right ankle wound with achilles tendon exposed. She was found to have osteomyelitis of the right foot and was transferred to [place] for consideration of revascularization and surgical opinion. She was recommended to undergo extra-anatomic bypass of the right axillary to femoral artery and right trans-tibial amputation was initially discussed, but decision was made to attempt limb salvage. She underwent the above bypass on 1/31/24 and then on 2/4/24 underwent right foot wound and Achilles debridement with 2nd toe partial amputation and VAC placement. Split-thickness skin graft was discussed among podiatry and plastic surgery, but has been deferred to a later time. Her hospital course was complicated by right chest wall hematoma and she underwent evacuation with vascular on 2/4/2024. VAC last changed on 2/8/24. No further OR interventions planned during this admission.    PM&R consulted for rehabilitation recommendations.    Restrictions include:  right lower extremity non-weight bearing    Hospital Complications/Comorbidities:   Complications: As above  Comorbidities: As above    Morbid Obesity (BMI >  40) No     Last Weight Last BMI   Wt Readings from Last 1 Encounters:   24 69.5 kg (153 lb 3.5 oz)    Body mass index is 28.95 kg/m².     Functional History:     Prior to Admission:     Functional Status: Patient was independent with mobility/ambulation, transfers, ADL's, IADL's.     Present:  Self Care:  Eatin: Independent  Oral hygiene: 03: Partial/moderate assistance  Toilet hygiene: 03: Partial/moderate assistance  Shower/bathing self: 03: Partial/moderate assistance  Upper body dressin: Partial/moderate assistance  Lower body dressin: Partial/moderate assistance  Putting on/taking off footwear: 03: Partial/moderate assistance  Transfers:  Roll left and right: 04: Supervision or touching  assistance  Sit to lyin: Partial/moderate assistance  Lying to sitting on side of bed: 04: Supervision or touching  assistance  Sit to stand: 03: Partial/moderate assistance  Chair/bed to chair transfer: 03: Partial/moderate assistance  Toilet transfer: 03: Partial/moderate assistance  Mobility:(Min A RW 2'x3)  Walk 10 ft: 88: Not attempted due to medical conditions or safety concerns  Walk 50 ft with two turns: 88: Not attempted due to medical conditions or safety concerns  Walk 150ft: 88: Not attempted due to medical conditions or safety concerns    Past Medical History:   Past Surgical History:   Family History:     Past Medical History:   Diagnosis Date    Anxiety     Closed fracture of coccyx (HCC) 2023    Diabetes mellitus (HCC)     GERD (gastroesophageal reflux disease)     Hyperlipidemia     Hypertension     IBS (irritable bowel syndrome)     Shoulder fracture     Past Surgical History:   Procedure Laterality Date    ANKLE FRACTURE SURGERY Right     has screw in place     SECTION      x2    CHOLECYSTECTOMY      CYSTOSCOPY W/ LASER LITHOTRIPSY Left 2023    Procedure: CYSTOSCOPY; RETROGRADE; URETEROSCOPY; BIOPSY; LEFT -INSERTION OF STENT;  Surgeon: Cristian Child MD;   Location: [facility];  Service: Urology    CYSTOSCOPY W/ LASER LITHOTRIPSY Left 11/20/2023    Procedure: CYSTOSCOPY; RETROGRADE; URETEROSCOPY; 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;  Surgeon: Cristian Child MD;  Location: [facility];  Service: Urology    EVACUATION OF HEMATOMA Right 2/4/2024    Procedure: EVACUATION/ DRAINAGE CHEST WALL  HEMATOMA;  Surgeon: Seth Hoyos MD;  Location: [facility];  Service: Vascular    FL RETROGRADE PYELOGRAM  9/18/2023    HERNIA REPAIR      umbilicus w/ mesh    [facility] BYPASS W/VEIN AXILLARY-FEMORAL Right 1/31/2024    Procedure: BYPASS AXILLO-FEMORAL, RIGHT WITH 8MM RINGED PTFE GRAFT;  Surgeon: Seth Hoyos MD;  [facility]: [facility];  Service: Vascular    WOUND DEBRIDEMENT Right 2/4/2024    Procedure: RIGHT WOUND AND ACHILLES DEBRIDEMENT FOOT/TOE (WASH OUT); PARTIAL AMPUTATION DISTAL 2ND TOE;  Surgeon: Aaron Montero DPM;  Location: [facility];  Service: Podiatry     Family History   Problem Relation Age of Onset    COPD Mother     Emphysema Mother     COPD Father     Emphysema Father           Medications:    Current Facility-Administered Medications:     acetaminophen (TYLENOL) tablet 975 mg, 975 mg, Oral, Q8H JILLIAN, Denisse Berger PA-C    albuterol (PROVENTIL HFA,VENTOLIN HFA) inhaler 2 puff, 2 puff, Inhalation, Q6H PRN, Denisse Berger PA-C    [START ON 2/10/2024] AMILoride tablet 5 mg, 5 mg, Oral, Daily, Denisse Berger PA-C    [START ON 2/10/2024] aspirin (ECOTRIN LOW STRENGTH) EC tablet 81 mg, 81 mg, Oral, Daily, Denisse Berger PA-C    atorvastatin (LIPITOR) tablet 10 mg, 10 mg, Oral, Daily With Dinner, Denisse Berger PA-C    [START ON 2/10/2024] bisacodyl (DULCOLAX) rectal suppository 10 mg, 10 mg, Rectal, Daily, Denisse Berger PA-C    [START ON 2/10/2024] buPROPion (WELLBUTRIN XL) 24 hr tablet 300 mg, 300 mg, Oral, Daily, Denisse Berger PA-C    clonazePAM (KlonoPIN) tablet 1 mg, 1 mg, Oral, QPM,  Denisse Berger PA-C    docusate sodium (COLACE) capsule 100 mg, 100 mg, Oral, BID, Denisse Berger PA-C    [START ON 2/10/2024] fish oil capsule 1,000 mg, 1,000 mg, Oral, Daily, Denisse Berger PA-C    [START ON 2/10/2024] fluticasone-vilanterol 200-25 mcg/actuation 1 puff, 1 puff, Inhalation, Daily, Denisse Berger PA-C    insulin lispro (HumaLOG) 100 units/mL subcutaneous injection 1-5 Units, 1-5 Units, Subcutaneous, TID AC **AND** Fingerstick Glucose (POCT), , , TID AC, Denisse Berger PA-C    insulin lispro (HumaLOG) 100 units/mL subcutaneous injection 1-5 Units, 1-5 Units, Subcutaneous, HS, Denisse Berger PA-C    insulin lispro (HumaLOG) 100 units/mL subcutaneous injection 5 Units, 5 Units, Subcutaneous, TID With Meals, Denisse Berger PA-C    methocarbamol (ROBAXIN) tablet 250 mg, 250 mg, Oral, Q8H JILLIAN, Denisse Berger PA-C    [START ON 2/10/2024] nicotine (NICODERM CQ) 21 mg/24 hr TD 24 hr patch 1 patch, 1 patch, Transdermal, Daily, Denisse Berger PA-C    ondansetron (ZOFRAN-ODT) dispersible tablet 4 mg, 4 mg, Oral, Q6H PRN, Denisse Berger PA-C    [START ON 2/10/2024] oxybutynin (DITROPAN-XL) 24 hr tablet 5 mg, 5 mg, Oral, Daily, Denisse Berger PA-C    oxyCODONE (ROXICODONE) split tablet 2.5 mg, 2.5 mg, Oral, Q4H PRN **OR** oxyCODONE (ROXICODONE) IR tablet 5 mg, 5 mg, Oral, Q4H PRN, Denisse Berger PA-C    [START ON 2/10/2024] pantoprazole (PROTONIX) EC tablet 40 mg, 40 mg, Oral, Early Morning, Denisse Berger PA-C    [START ON 2/10/2024] polyethylene glycol (MIRALAX) packet 17 g, 17 g, Oral, Daily, Denisse Berger PA-C    pregabalin (LYRICA) capsule 200 mg, 200 mg, Oral, TID, Denisse Berger PA-C    rivaroxaban (XARELTO) tablet 2.5 mg, 2.5 mg, Oral, BID With Meals, Denisse Berger PA-C    senna (SENOKOT) tablet 17.2 mg, 2 tablet, Oral, HS, Denisse Berger PA-C    Allergies:   Allergies   Allergen Reactions    Paroxetine  Other (See Comments)     Became suicidal    Adhesive [Medical Tape] Itching and Blisters    Carafate [Sucralfate] Other (See Comments)     Mouth sores, tongue sore    Sulfa Antibiotics Hives and Rash    Sulfamethoxazole-Trimethoprim Hives      Social History:    Social History     Socioeconomic History    Marital status: /Civil Union     Spouse name: None    Number of children: None    Years of education: None    Highest education level: None   Occupational History    None   Tobacco Use    Smoking status: Every Day     Current packs/day: 1.00     Average packs/day: 1 pack/day for 63.1 years (63.1 ttl pk-yrs)     Types: Cigarettes     Start date: 1961    Smokeless tobacco: Never    Tobacco comments:     11/14/23 smokes about 3/4 PPD   Vaping Use    Vaping status: Never Used   Substance and Sexual Activity    Alcohol use: Not Currently    Drug use: Never    Sexual activity: Not Currently   Other Topics Concern    None   Social History Narrative    None     Social Determinants of Health     Financial Resource Strain: Low Risk  (2/9/2023)    Overall Financial Resource Strain (CARDIA)     Difficulty of Paying Living Expenses: Not hard at all   Food Insecurity: No Food Insecurity (1/24/2024)    Hunger Vital Sign     Worried About Running Out of Food in the Last Year: Never true     Ran Out of Food in the Last Year: Never true   Transportation Needs: No Transportation Needs (1/24/2024)    PRAPARE - Transportation     Lack of Transportation (Medical): No     Lack of Transportation (Non-Medical): No   Physical Activity: Not on file   Stress: Not on file   Social Connections: Not on file   Intimate Partner Violence: Not on file   Housing Stability: Low Risk  (1/24/2024)    Housing Stability Vital Sign     Unable to Pay for Housing in the Last Year: No     Number of Places Lived in the Last Year: 1     Unstable Housing in the Last Year: No      Lona Cedeno Lives with: lives with their family.  She lives in a(n) single  family home  The living area: can live on one level  There 5 steps to enter the home.    Patient/family's goals: Return to previous home/apartment.  The patient will not have 24 hour ARC Supervision/physical assistance: supervision/physical assistance available upon discharge.    Review of Systems: A 10-point review of systems was performed. Negative except as listed above.     Physical Exam:  Vital Signs:      Temp:  [97.7 °F (36.5 °C)-98.9 °F (37.2 °C)] 97.7 °F (36.5 °C)  HR:  [72-76] 72  Resp:  [16-17] 16  BP: (124-137)/(56-66) 124/66 No intake or output data in the 24 hours ending 02/09/24 1628     Laboratory:      Lab Results   Component Value Date    HGB 10.3 (L) 02/09/2024    HGB 16.8 (H) 05/21/2018    HCT 32.3 (L) 02/09/2024    HCT 50.1 (H) 05/21/2018    WBC 9.90 02/09/2024    WBC 9.0 05/21/2018     Lab Results   Component Value Date    BUN 14 02/09/2024    BUN 15 05/21/2018     05/21/2018    K 4.4 02/09/2024    K 4.0 05/21/2018     02/09/2024    CL 95 (L) 05/21/2018    GLUCOSE 143 (H) 01/31/2024    CREATININE 0.59 (L) 02/09/2024    CREATININE 0.62 05/21/2018     Lab Results   Component Value Date    PROTIME 13.4 02/04/2024    INR 1.03 02/04/2024        GEN: Patient seen resting comfortably in bedside chair, NAD  HEENT: NCAT; mucous membranes moist  CV: No LLE edema  PULM: Breathing comfortably on room air  ABD: Non-distended  SKIN: Right lower limb dressed, VAC in place and draining under negative pressure; no other obvious rashes or lesions on exposed skin  NEURO:  Awake and alert, answering questions appropriately to context  Speech is fluent and organized; phrase repetition is intact  CN II-XII grossly intact  MMT (R/L): B 5/5; T 5/5; WE 5/5; FF 5/5; HF 4/5; KE NT/5; DF NT/5; PF NT/5    Imaging: Reviewed  No results found.    Assessment and Recommendations:  PM&R consulted for rehabilitation recommendations.     * Diabetic foot ulcer with osteomyelitis (HCC)  Assessment & Plan  - Now  monitoring off antibiotics per prior ID recommendations. S/p right axillary to femoral bypass on 1/31/24 as well as right foot wound and Achilles debridement with 2nd digit partial amputation and VAC placement on 2/4/24  - Trans-tibial amputation has been deferred at this time while monitoring on re-vascularization  - Podiatry and plastic surgery discussed case, no further OR interventions are planned during this admission, split-thickness skin graft may be considered in the future.   - Continue PT and OT  - NWB RLE      Acute pain due to injury  Assessment & Plan  - Tylenol 975 mg q8h scheduled  - Methocarbamol 250 mg q8h scheduled  - Pregabalan 200 mg TID scheduled  - Oxycodone 2.5 or 5 mg q4h PRN  - Will wean as tolerated    Impaired mobility and activities of daily living  Assessment & Plan  - Rehabilitation medicine physician for daily monitoring of care, 24 hour availability for acute  medical issues, medication management, and therapeutic and diagnostic assessments.  - 24 hour rehabilitation nursing 7 days per week for: management/teaching of medications,  bowel/bladder routine, skin care.  - PT, OT for 2-3 hours per day, 5 to 6 days per week; 15 hours per week  - Rehabilitation Psychology for adjustment and coping  - MSW for barriers to discharge, community resources, and family support  - Discharge planning following to help ensure a safe and efficient discharge      Wound of skin  Assessment & Plan  - Continue local wound care consisting of wound VAC to posterior right ankle ulcer with Dermagran to the anterior ankle wound and Betadine Adaptic to the right second partial toe amputation site  - Elevation on green foam wedges or pillows when non-ambulatory.  - Podiatry to follow for VAC changes and assessments  - Monitor clinically for signs of infection    At high risk for skin breakdown  Assessment & Plan  - Monitor for breakdown, frequent turns      Constipation  Assessment & Plan  - Bowel regimen:  Bisacodyl suppository daily, senna 2 tablets qHs, miralax daily scheduled  - Will follow BMs and adjust bowel regimen to target soft, formed stools q1-2 days, per pt's regular schedule.      Urinary incontinence  Assessment & Plan  - Will follow UOP and encourage spontaneous voids on timed void schedule. If unable to void spontaneously, will monitor with PVR bladder scans and initiate ISC regimen.  - Oxybutynin 5 mg daily      Anxiety and depression  Assessment & Plan  - Bupropion 300 mg daily  - Clonazepam 1 mg qHs    At risk for venous thromboembolism (VTE)  Assessment & Plan  - On rivaroxaban    Brandon Oconnell MD  Attending Physician  Physical Medicine and Rehabilitation  [facility]

## 2024-02-09 NOTE — PROGRESS NOTES
[facility] Podiatry - Progress Note  Patient: Lona Cedeno 78 y.o. female   MRN: [id number]  PCP: Vivek Preston,   [facility] Encounter: [id number]  Date Of Visit: 24    ASSESSMENT:    Lona Cedeno is a 78 y.o. female with:    Right posterior ankle ulcer with exposed achilles tendon  - Right wound debridement (DOS: 24)  Right medial ankle ulcer, limited to breakdown of skin  Osteomyelitis of right second toe  - Right 2nd toe amputation (DOS: 24)  T2DM  PAD  Tobacco use disorder  Diabetic polyneuropathy    PLAN:    Wound vac dressings changed at bedside. Right medial ankle wound and right posterior ankle wounds are stable with no acute clinical signs of infection. Performed debridement of wounds, see procedure note below  Right second toe amputation incision site is stable with sutures intact and no acute clinical signs of infection at this time  Plan to continue negative pressure wound VAC therapy at this time with next change on 2024.  Patient is scheduled to be discharged to [facility] today, may require re-admission at a future date for surgical intervention of a STSG application to the right lower extremity  Podiatry to do dressings changes to all right lower extremity wounds  Patient is to place right lower leg in prevalon boot to offload posterior aspect of lower leg  Elevation on green foam wedges or pillows when non-ambulatory.  Rest of care per primary team.    Weight bearing status: Nonweightbearing to right lower extremity    Wound VAC application  1. Number of sponges: 1  2. Pressure settin continuous  3. Size of wound: Length 5.8cm: Width 4cm: Depth 0.3cm:   4. Description of wound: granular, healthy bleeding, tendon exposed with mixed fibrotic/granular tissue    Debridement Procedure - right medial ankle    After  Verbal Consent was obtained and time out performed. Wound located right medial ankle was  excisionally Surgical- Subcutaneous  (CPT: 29816) debrided using  scapel. Pre-debridement wound measures 2.2 cm x 2.6 cm x 0.2 cm.  Pain was controlled by  atraumatic technique, neuropathy . Post debridement measurement 2.2 cm x 2.8 cm x 0.2 cm for a total of 6 square centimeters, with wound appearing Fresh bleeding tissue, viable, and granular. 100%  of wound debrided. Tissue debrided includes depth of Epidermis, Dermis, and Subcutaneous with devitalized tissue being debrided including Slough and Biofilm.  A small amount of bleeding bleeding was noted during procedure. Hemostasis was achieved using pressure. Pain noted during procedure rated as 2. Pain noted after procedure rated as 0. Procedure was Well tolerated by patient.    Debridement Procedure - right posterior ankle    After  Verbal Consent was obtained and time out performed. Wound located right posterior was  excisionally Surgical- fat / tendon / muscle (CPT: 22708) debrided using scapel. Pre-debridement wound measures 5.8 cm x 3.8 cm x 0.3 cm.  Pain was controlled by Lack of sensation due to Neuropathy . Post debridement measurement 5.8 cm x 4 cm x 0.3 cm for a total of 22 square centimeters, with wound appearing Fresh bleeding tissue, viable, and granular. 100%  of wound debrided. Tissue debrided includes depth of Epidermis, Dermis, Subcutaneous, and Tendon with devitalized tissue being debrided including Hyperkeratosis, Slough, and Biofilm.  A small amount of bleeding was noted during procedure. Hemostasis was achieved using pressure. Pain noted during procedure rated as 2. Pain noted after procedure rated as 0. Procedure was Well tolerated by patient.    SUBJECTIVE:     The patient was seen, evaluated, and assessed at bedside today. The patient was awake, alert, and in no acute distress. No acute events overnight. The patient reports mild pain in her right lower extremity with dressings changes. Patient denies N/V/F/chills/SOB/CP.    OBJECTIVE:     Vitals:   /56 (BP Location: Left arm)   Pulse 73   Temp 98  "°F (36.7 °C) (Oral)   Resp 16   Ht 5' 1\" (1.549 m)   Wt 66.1 kg (145 lb 11.6 oz)   SpO2 92%   BMI 27.53 kg/m²     Temp (24hrs), Av.4 °F (36.9 °C), Min:98 °F (36.7 °C), Max:98.9 °F (37.2 °C)      Physical Exam:     General:  Alert, cooperative, and in no distress.  Lower extremity exam:  Cardiovascular status at baseline.  Neurological status at baseline.  Musculoskeletal status at baseline. No calf tenderness noted.     Lower extremity wound(s) as noted below:  Wound #: 1  Location: right posterior ankle  Length 5.8cm: Width 4cm: Depth 0.3cm:   Deepest Tissue Noted in Base: tendon  Probe to Bone: No  Peripheral Skin Description: Attached  Granulation: 80% Fibrotic Tissue: 20% Necrotic Tissue: 0%   Drainage Amount: minimal, bloody  Signs of Infection: No    Wound #: 2  Location: right medial ankle  Length 2.2cm: Width 2.8cm: Depth 0.2cm:   Deepest Tissue Noted in Base: subcutaneous  Probe to Bone: No  Peripheral Skin Description: Attached  Granulation: 80% Fibrotic Tissue: 20% Necrotic Tissue: 0%   Drainage Amount: minimal, bloody  Signs of Infection: No    Right second digit: S/p second digit partial amputation. Incision site stable with sutures intact and skin edges well aligned. Capillary refill to skin edges < 3 seconds. Skin temperature within normal limits. No evidence of dehiscence. No signs of active infection: no purulence, no malodor, no ascending erythema, no crepitus, no fluctuance.     Clinical Images 24:                Additional Data:     Labs:    Results from last 7 days   Lab Units 24  0540   WBC Thousand/uL 9.90   HEMOGLOBIN g/dL 10.3*   HEMATOCRIT % 32.3*   PLATELETS Thousands/uL 367   NEUTROS PCT % 62   LYMPHS PCT % 21   MONOS PCT % 8   EOS PCT % 8*     Results from last 7 days   Lab Units 24  0540 24  0458 24  0531   POTASSIUM mmol/L 4.4   < > 4.3   CHLORIDE mmol/L 100   < > 100   CO2 mmol/L 30   < > 28   BUN mg/dL 14   < > 13   CREATININE mg/dL 0.59*   < > " "0.51*   CALCIUM mg/dL 10.0   < > 9.3   ALK PHOS U/L  --   --  46   ALT U/L  --   --  3*   AST U/L  --   --  11*    < > = values in this interval not displayed.     Results from last 7 days   Lab Units 02/04/24  0440   INR  1.03       * I Have Reviewed All Lab Data Listed Above.    Recent Cultures (last 7 days):               Imaging: I have personally reviewed pertinent films in PACS  EKG, Pathology, and Other Studies: I have personally reviewed pertinent reports.      ** Please Note: Portions of the record may have been created with voice recognition software. Occasional wrong word or \"sound a like\" substitutions may have occurred due to the inherent limitations of voice recognition software. Read the chart carefully and recognize, using context, where substitutions have occurred. **      "

## 2024-02-09 NOTE — CASE MANAGEMENT
[facility] has received approved authorization from insurance:  Ann   Called in by Rep:christian ROCHA#  [phone]  Authorization received for: Acute Rehab  Facility: [facility]    Authorization #:[id number]  Start of Care:2/9  Next Review Date:2 business days   Continued Stay Care Coordinator: christian ROCHA#: [phone]  Submit next review to: fax [phone]   Care Manager notified:Shaneka Carlos

## 2024-02-09 NOTE — ASSESSMENT & PLAN NOTE
Lab Results   Component Value Date    HGBA1C 6.5 (H) 11/07/2023       Recent Labs     02/08/24 2028 02/09/24  0623 02/09/24  1057 02/09/24  1532   POCGLU 136 165* 123 102         Blood Sugar Average: Last 72 hrs:  (P) 102  Home: Januvia 100mg daily, metformin 1000mg daily  Here: Humalog 5U 3x daily with meals. Continue accuchecks and SSI.  Will monitor and work back to home regimen.

## 2024-02-09 NOTE — PLAN OF CARE
Problem: Prexisting or High Potential for Compromised Skin Integrity  Goal: Skin integrity is maintained or improved  Description: INTERVENTIONS:  - Identify patients at risk for skin breakdown  - Assess and monitor skin integrity  - Assess and monitor nutrition and hydration status  - Monitor labs   - Assess for incontinence   - Turn and reposition patient  - Assist with mobility/ambulation  - Relieve pressure over bony prominences  - Avoid friction and shearing  - Provide appropriate hygiene as needed including keeping skin clean and dry  - Evaluate need for skin moisturizer/barrier cream  - Collaborate with interdisciplinary team   - Patient/family teaching  - Consider wound care consult   Outcome: Progressing     Problem: PAIN - ADULT  Goal: Verbalizes/displays adequate comfort level or baseline comfort level  Description: Interventions:  - Encourage patient to monitor pain and request assistance  - Assess pain using appropriate pain scale  - Administer analgesics based on type and severity of pain and evaluate response  - Implement non-pharmacological measures as appropriate and evaluate response  - Consider cultural and social influences on pain and pain management  - Notify physician/advanced practitioner if interventions unsuccessful or patient reports new pain  Outcome: Progressing     Problem: INFECTION - ADULT  Goal: Absence or prevention of progression during hospitalization  Description: INTERVENTIONS:  - Assess and monitor for signs and symptoms of infection  - Monitor lab/diagnostic results  - Monitor all insertion sites, i.e. indwelling lines, tubes, and drains  - Monitor endotracheal if appropriate and nasal secretions for changes in amount and color  - [place] appropriate cooling/warming therapies per order  - Administer medications as ordered  - Instruct and encourage patient and family to use good hand hygiene technique  - Identify and instruct in appropriate isolation precautions for  identified infection/condition  Outcome: Progressing     Problem: SAFETY ADULT  Goal: Patient will remain free of falls  Description: INTERVENTIONS:  - Educate patient/family on patient safety including physical limitations  - Instruct patient to call for assistance with activity   - Consult OT/PT to assist with strengthening/mobility   - Keep Call bell within reach  - Keep bed low and locked with side rails adjusted as appropriate  - Keep care items and personal belongings within reach  - Initiate and maintain comfort rounds  - Make Fall Risk Sign visible to staff  - Offer Toileting every 2 Hours, in advance of need  - Initiate/Maintain bed alarm  - Obtain necessary fall risk management equipment: alarms  - Apply yellow socks and bracelet for high fall risk patients  - Consider moving patient to room near nurses station  Outcome: Progressing  Goal: Maintain or return to baseline ADL function  Description: INTERVENTIONS:  -  Assess patient's ability to carry out ADLs; assess patient's baseline for ADL function and identify physical deficits which impact ability to perform ADLs (bathing, care of mouth/teeth, toileting, grooming, dressing, etc.)  - Assess/evaluate cause of self-care deficits   - Assess range of motion  - Assess patient's mobility; develop plan if impaired  - Assess patient's need for assistive devices and provide as appropriate  - Encourage maximum independence but intervene and supervise when necessary  - Involve family in performance of ADLs  - Assess for home care needs following discharge   - Consider OT consult to assist with ADL evaluation and planning for discharge  - Provide patient education as appropriate  Outcome: Progressing  Goal: Maintains/Returns to pre admission functional level  Description: INTERVENTIONS:  - Perform AM-PAC 6 Click Basic Mobility/ Daily Activity assessment daily.  - Set and communicate daily mobility goal to care team and patient/family/caregiver.   - Collaborate with  rehabilitation services on mobility goals if consulted  - Perform Range of Motion 3 times a day.  - Reposition patient every 2 hours.  - Dangle patient 3 times a day  - Stand patient 3 times a day  - Ambulate patient 3 times a day  - Out of bed to chair 3 times a day   - Out of bed for meals 3 times a day  - Out of bed for toileting  - Record patient progress and toleration of activity level   Outcome: Progressing     Problem: DISCHARGE PLANNING  Goal: Discharge to home or other facility with appropriate resources  Description: INTERVENTIONS:  - Identify barriers to discharge w/patient and caregiver  - Arrange for needed discharge resources and transportation as appropriate  - Identify discharge learning needs (meds, wound care, etc.)  - Arrange for interpretive services to assist at discharge as needed  - Refer to Case Management Department for coordinating discharge planning if the patient needs post-hospital services based on physician/advanced practitioner order or complex needs related to functional status, cognitive ability, or social support system  Outcome: Progressing

## 2024-02-09 NOTE — ASSESSMENT & PLAN NOTE
- Will follow UOP and encourage spontaneous voids on timed void schedule. If unable to void spontaneously, will monitor with PVR bladder scans and initiate ISC regimen.  - Oxybutynin 5 mg daily

## 2024-02-09 NOTE — CASE MANAGEMENT
Case Management Discharge Planning Note    Patient name Lona Cedeno  Location [facility]-01 MRN [id number]  : 1945 Date 2024       Current Admission Date: 2024  Current Admission Diagnosis:Diabetic foot ulcer with osteomyelitis (HCC)   Patient Active Problem List    Diagnosis Date Noted    Hematoma 2024    Anemia 2024    Constipation 2024    Preoperative cardiovascular examination 2024    Diabetic foot ulcer with osteomyelitis (HCC) 2024    Ankle ulcer, right, with fat layer exposed ([facility]) 2023    Age-related osteoporosis without current pathological fracture 2023    Abnormal CT of the chest 10/17/2023    COPD, severity to be determined ([facility]) 10/17/2023    Tobacco use disorder 10/17/2023    PAD (peripheral artery disease) ([facility]) 10/10/2023    Bladder neoplasm 2023    Left renal mass 2023    Lactic acidosis 2023    Hypomagnesemia 2023    Degenerative lumbar disc 2023    Urinary incontinence 2023    GERD without esophagitis 10/16/2019    Medicare annual wellness visit, subsequent 2019    Essential hypertension 02/15/2019    Anxiety and depression 02/15/2019    Type 2 diabetes mellitus with diabetic polyneuropathy ([facility]) 2019      LOS (days): 14  Geometric Mean LOS (GMLOS) (days): 3.8  Days to GMLOS:-9.7     OBJECTIVE:  Risk of Unplanned Readmission Score: 20.63         Current admission status: Inpatient   Preferred Pharmacy:   [id number] [address] [address]  Phone: [phone] Fax: [phone]    [place], [place] - [address]  [address] [facility] [place] [address]  Phone: [phone] Fax: [phone]    Primary Care Provider: Vivek Preston DO    Primary Insurance: [employer] [facility]  Secondary Insurance:     DISCHARGE DETAILS:    CATRACHITO returned phone call to Janee at [facility] (PH: [phone]) who was  requesting additional information about the Pts procedures while admitted, and need for acute rehab. CATRACHITO provided additional information. Janee states that she will send the information to the medical director and will then contact  discharge support once a decision has been made.  CM will await an update.

## 2024-02-09 NOTE — ASSESSMENT & PLAN NOTE
Lab Results   Component Value Date    HGBA1C 6.5 (H) 11/07/2023       Recent Labs     02/08/24  1611 02/08/24 2028 02/09/24  0623 02/09/24  1057   POCGLU 114 136 165* 123       Blood Sugar Average: Last 72 hrs:    Home: Januvia 100mg daily, metformin 1000mg daily  Here: Humalog 5U 3x daily with meals. Continue accuchecks and SSI.  Will monitor and work back to home regimen.

## 2024-02-09 NOTE — ASSESSMENT & PLAN NOTE
- Tylenol 975 mg q8h scheduled  - Methocarbamol 250 mg q8h scheduled  - Pregabalan 200 mg TID scheduled  - Oxycodone 2.5 or 5 mg q4h PRN  - Will wean as tolerated

## 2024-02-09 NOTE — PROGRESS NOTES
[facility]  Progress Note  Name: Lona Cedeno I  MRN: [id number]  Unit/Bed#: [facility] 503-01 I Date of Admission: 1/26/2024   Date of Service: 2/9/2024 I Hospital Day: 14    Assessment/Plan   * Diabetic foot ulcer with osteomyelitis (HCC)  Assessment & Plan  Transferred to [facility] for vascular eval/ongoing podiatry eval   S/p right Ax to fem bypass on 1/31  S/p right foot wound and Achilles debridement with second toe partial amputation and VAC placement   Discussed with vascular surgery and no further vascular surgery intervention planned  Reviewed plastic surgery evaluation unfortunately patient is not a candidate for free muscle flap at this time and will benefit from STSG for skin graft substitute  Monitoring off antibiotics per ID evaluation at [facility]  Discussed with podiatry and patient will not need further OR interventions during this admission.  She will be discharged on wound VAC with follow-up with wound care and evaluation in the outpatient setting for possible split-thickness skin graft down the line.  PT/OT evaluation with level 2 recommendation. PM&R accepted to [facility], awaiting insurance Auth.  Patient medically stable  NWB on right foot. Fall precautions    Anemia  Assessment & Plan  Likely acute blood loss from wound, acute on chronic inflammation   Baseline 12-14  Monitor and transfuse if <7    Hematoma  Assessment & Plan  Over right chest wall. Possible seroma  S/p evacuation by vascular surgery on 2/4  Discussed with vascular surgery okay to restart DVT prophylaxis    Constipation  Assessment & Plan    Continue MiraLAX and senna    Tobacco use disorder  Assessment & Plan  Educated on tobacco cessation  Continue nicotine patch    COPD, severity to be determined (HCC)  Assessment & Plan  Continue inhalers  Not in exacerbation  O2 sat 87-88% overnight, placed on 2 L nasal cannula.  Now on room air  Supplemental O2 to keep O2 sats between 88-92%    PAD (peripheral artery  disease) ([facility])  Assessment & Plan  Continue aspirin and Lipitor  S/p bypass  Encouraged tobacco cessation    Anxiety and depression  Assessment & Plan  Continue bupropion and clonazepam at bedtime    Essential hypertension  Assessment & Plan  Continue amiloride  BP reviewed and stable    Type 2 diabetes mellitus with diabetic polyneuropathy ([facility])  Assessment & Plan  Lab Results   Component Value Date    HGBA1C 6.5 (H) 11/07/2023       Recent Labs     02/08/24  1115 02/08/24  1611 02/08/24 2028 02/09/24  0623   POCGLU 145* 114 136 165*         Blood Sugar Average: Last 72 hrs:  (P) [id number]  Outpatient regimen metformin 1000 mg twice daily and Januvia, holding  Continue Humalog 3 times daily and insulin sliding scale  Continue Lyrica  Accu-Cheks reviewed.  Continue               VTE Pharmacologic Prophylaxis: VTE Score: 4 Moderate Risk (Score 3-4) - Pharmacological DVT Prophylaxis Ordered: rivaroxaban (Xarelto).    Mobility:   Basic Mobility Inpatient Raw Score: 15  JH-HLM Goal: 4: Move to chair/commode  JH-HLM Achieved: 4: Move to chair/commode  HLM Goal achieved. Continue to encourage appropriate mobility.    Patient Centered Rounds: I performed bedside rounds with nursing staff today.   Discussions with Specialists or Other Care Team Provider: Podiatry,     Education and Discussions with Family / Patient: Attempted to update  () via phone. Unable to contact.    Total Time Spent on Date of Encounter in care of patient: 35 mins. This time was spent on one or more of the following: performing physical exam; counseling and coordination of care; obtaining or reviewing history; documenting in the medical record; reviewing/ordering tests, medications or procedures; communicating with other healthcare professionals and discussing with patient's family/caregivers.    Current Length of Stay: 14 day(s)  Current Patient Status: Inpatient   Certification Statement: The patient will  continue to require additional inpatient hospital stay due to insurance Auth  Discharge Plan: Anticipate discharge later today or tomorrow to rehab facility.    Code Status: Level 1 - Full Code    Subjective:   No acute events overnight.  Patient reports feeling well this morning.  Tolerating oral intake.  No shortness of breath or chest pain.  Reports normal bowel movements.  No foot pain.    Objective:     Vitals:   Temp (24hrs), Av.2 °F (36.8 °C), Min:97.8 °F (36.6 °C), Max:98.9 °F (37.2 °C)    Temp:  [97.8 °F (36.6 °C)-98.9 °F (37.2 °C)] 98 °F (36.7 °C)  HR:  [73-83] 73  Resp:  [16-17] 16  BP: (125-169)/() 125/56  SpO2:  [90 %-93 %] 92 %  Body mass index is 27.53 kg/m².     Input and Output Summary (last 24 hours):     Intake/Output Summary (Last 24 hours) at 2024 0949  Last data filed at 2024 0815  Gross per 24 hour   Intake 1140 ml   Output 1550 ml   Net -410 ml       Physical Exam:   Physical Exam  Vitals and nursing note reviewed.   Constitutional:       General: She is not in acute distress.     Appearance: She is well-developed.   HENT:      Head: Normocephalic and atraumatic.   Eyes:      Conjunctiva/sclera: Conjunctivae normal.   Cardiovascular:      Rate and Rhythm: Normal rate and regular rhythm.   Pulmonary:      Effort: Pulmonary effort is normal. No respiratory distress.      Breath sounds: Normal breath sounds. No wheezing.   Musculoskeletal:         General: No swelling.      Cervical back: Neck supple.   Skin:     General: Skin is warm and dry.   Neurological:      Mental Status: She is alert.          Additional Data:     Labs:  Results from last 7 days   Lab Units 24  0540   WBC Thousand/uL 9.90   HEMOGLOBIN g/dL 10.3*   HEMATOCRIT % 32.3*   PLATELETS Thousands/uL 367   NEUTROS PCT % 62   LYMPHS PCT % 21   MONOS PCT % 8   EOS PCT % 8*     Results from last 7 days   Lab Units 24  0540 24  0458 24  0531   SODIUM mmol/L 138   < > 137   POTASSIUM mmol/L 4.4    < > 4.3   CHLORIDE mmol/L 100   < > 100   CO2 mmol/L 30   < > 28   BUN mg/dL 14   < > 13   CREATININE mg/dL 0.59*   < > 0.51*   ANION GAP mmol/L 8   < > 9   CALCIUM mg/dL 10.0   < > 9.3   ALBUMIN g/dL  --   --  3.0*   TOTAL BILIRUBIN mg/dL  --   --  0.51   ALK PHOS U/L  --   --  46   ALT U/L  --   --  3*   AST U/L  --   --  11*   GLUCOSE RANDOM mg/dL 143*   < > 150*    < > = values in this interval not displayed.     Results from last 7 days   Lab Units 02/04/24  0440   INR  1.03     Results from last 7 days   Lab Units 02/09/24  0623 02/08/24  2028 02/08/24  1611 02/08/24  1115 02/08/24  0541 02/07/24  2050 02/07/24  1534 02/07/24  1032 02/07/24  0612 02/06/24  2102 02/06/24  1551 02/06/24  1127   POC GLUCOSE mg/dl 165* 136 114 145* 162* 161* 93 145* 169* 139 112 155*         Results from last 7 days   Lab Units 02/03/24  0557   PROCALCITONIN ng/ml 0.06       Lines/Drains:  Invasive Devices       Drain  Duration             Ureteral Internal Stent Left ureter 6 Fr. 81 days                          Imaging: No pertinent imaging reviewed.    Recent Cultures (last 7 days):         Last 24 Hours Medication List:   Current Facility-Administered Medications   Medication Dose Route Frequency Provider Last Rate    acetaminophen  975 mg Oral Q8H [facility] Narinder Street, DO      albuterol  2 puff Inhalation Q6H PRN Narinder P Jad, DO      AMILoride  5 mg Oral Daily Narinder P Jad, DO      aspirin  81 mg Oral Daily Narinder P Zoltansjordan, DO      atorvastatin  10 mg Oral Daily With Dinner Narinder Street, DO      bisacodyl  10 mg Rectal Daily Ofe Wood MD      buPROPion  300 mg Oral Daily Narinder P Zoltansbrook, DO      clonazePAM  1 mg Oral QPM Narinder Street, DO      docusate sodium  100 mg Oral BID Clara Gann,       fish oil  1,000 mg Oral Daily Narinder P Allsbrook, DO      fluticasone-vilanterol  1 puff Inhalation Daily Narinder P Allsbrook, DO      HYDROmorphone  0.2 mg Intravenous Q4H  PRN Narinder Street, DO      insulin lispro  1-5 Units Subcutaneous TID AC Narinder Street, DO      insulin lispro  1-5 Units Subcutaneous HS Narinder Street, DO      insulin lispro  5 Units Subcutaneous TID With Meals Narinderasif Street, DO      methocarbamol  250 mg Oral Q8H JILLIAN Narinderasif Street, DO      nicotine  1 patch Transdermal Daily Narinderasif Street, DO      ondansetron  4 mg Intravenous Q6H PRN Narinder Street, DO      oxybutynin  5 mg Oral Daily Narinderasif Street, DO      oxyCODONE  2.5 mg Oral Q4H PRN Narinder Street, DO      Or    oxyCODONE  5 mg Oral Q4H PRN Narinder Street, DO      pantoprazole  40 mg Oral Early Morning Narinder Street, DO      polyethylene glycol  17 g Oral Daily Ofe Wood MD      pregabalin  200 mg Oral TID Narinder Street, DO      rivaroxaban  2.5 mg Oral BID With Meals Ofe Wood MD      senna  2 tablet Oral HS Ofe Wood MD          Today, Patient Was Seen By: Ofe Wood MD    **Please Note: This note may have been constructed using a voice recognition system.**

## 2024-02-09 NOTE — ASSESSMENT & PLAN NOTE
- Continue local wound care consisting of wound VAC to posterior right ankle ulcer with Dermagran to the anterior ankle wound and Betadine Adaptic to the right second partial toe amputation site  - Elevation on green foam wedges or pillows when non-ambulatory.  - Podiatry to follow for VAC changes and assessments  - Monitor clinically for signs of infection

## 2024-02-09 NOTE — TREATMENT PLAN
Individualized Plan of Care - [facility] [place]  Lona Cedeno 78 y.o. female MRN: [id number]  Unit/Bed#: -01 Encounter: [id number]     PATIENT INFORMATION  ADMISSION DATE: 2/9/2024  2:17 PM ANGELINA CATEGORY:13 Other Disabling Impairments   ADMISSION DIAGNOSIS: Foot ulcer, right (HCC) [L97.519]  EXPECTED LOS: 10-14 days     MEDICAL/FUNCTIONAL PROGNOSIS  Based on my assessment of the patient's medical conditions and current functional status, the prognosis for attaining medical and functional goals or the IRF stay is:  Fair    Medical Goals: Patient will be medically stable for discharge to [facility] upon completion of rehab program and Patient will be able to manage medical conditions and comorbid conditions with medications and follow up upon completion of rehab program    ANTICIPATED DISCHARGE DISPOSITION AND SERVICES  COMMUNITY SETTING: Home - with supervision    Is a 24-hr caregiver available? No    ANTICIPATED FOLLOW-UP SERVICE:   Home Health Services: PT, OT, and Nursing    DISCIPLINE SPECIFIC PLANS:  Required Disciplines & Services: Rehabillitation Nursing, Case Management, Dietay/Nutrition, and Psychology    REQUIRED THERAPY:  Therapy Hours per Day Days per Week Total Days   Physical Therapy 1.5 5-6 10-14   Occupational Therapy 1.5 5-6 10-14   NOTE: Additional therapy time(s) may be added as appropriate to meet patient needs and to achieve functional goals.    ANTICIPATED FUNCTIONAL OUTCOMES:  ADL:  Modified independent with the least restrictive device   Bladder/Bowel:  Modified independent with the least restrictive device   Transfers:  Modified independent with the least restrictive device   Locomotion:  Modified independent with the least restrictive device   Cognitive:  Modified independent with the least restrictive device     DISCHARGE PLANNING NEEDS  Equipment needs: Discharge needs to be reviewed with team    REHAB ANTICIPATED PARTICIPATION RESTRICTIONS:  Weight  Bearing restricted (DAWOOD RLE)    Brandon Oconnell MD  Attending Physician  Physical Medicine and Rehabilitation  [facility]

## 2024-02-09 NOTE — ASSESSMENT & PLAN NOTE
Lab Results   Component Value Date    HGBA1C 6.5 (H) 11/07/2023       Recent Labs     02/08/24  1115 02/08/24  1611 02/08/24 2028 02/09/24  0623   POCGLU 145* 114 136 165*         Blood Sugar Average: Last 72 hrs:  (P) 140.3808414332462056  Outpatient regimen metformin 1000 mg twice daily and Januvia, holding  Continue Humalog 3 times daily and insulin sliding scale  Continue Lyrica  Accu-Cheks reviewed.  Continue

## 2024-02-09 NOTE — ASSESSMENT & PLAN NOTE
- Bowel regimen: Bisacodyl suppository daily, senna 2 tablets qHs, miralax daily scheduled  - Will follow BMs and adjust bowel regimen to target soft, formed stools q1-2 days, per pt's regular schedule.

## 2024-02-09 NOTE — H&P
[facility]  H&P  Name: Lona Cedeno 78 y.o. female I MRN: [id number]  Unit/Bed#: -01 I Date of Admission: 2/9/2024   Date of Service: 2/9/2024 I Hospital Day: 0      Assessment/Plan   * Diabetic foot ulcer with osteomyelitis (HCC)  Assessment & Plan  Lab Results   Component Value Date    HGBA1C 6.5 (H) 11/07/2023       Recent Labs     02/08/24 2028 02/09/24  0623 02/09/24  1057 02/09/24  1532   POCGLU 136 165* 123 102         Blood Sugar Average: Last 72 hrs:  (P) 102  Podiatry to follow  Continue VAC dressing.  Follow-up outpt for potential split thickness skin graft.  Observe off abx.      Anemia  Assessment & Plan  Hgb stable post-op.  Continue to monitor.    Tobacco use disorder  Assessment & Plan  Continue Nicotine patch.  Cessation encouraged.    COPD, severity to be determined ([facility])  Assessment & Plan  Continue inhalers.  Pt uses Advair 250/50 at home.  Monitor respiratory status.    PAD (peripheral artery disease) ([facility])  Assessment & Plan  Continue ASA and atorvastatin.  Encourage tobacco cessation.    Anxiety and depression  Assessment & Plan  Continue home medications.    Essential hypertension  Assessment & Plan  Home: amiloride 5mg daily, lisinopril 5mg daily  Here: amiloride 5mg daily  Monitor BP and adjust medications as necessary.    Type 2 diabetes mellitus with diabetic polyneuropathy (HCC)  Assessment & Plan  Lab Results   Component Value Date    HGBA1C 6.5 (H) 11/07/2023       Recent Labs     02/08/24 2028 02/09/24  0623 02/09/24  1057 02/09/24  1532   POCGLU 136 165* 123 102         Blood Sugar Average: Last 72 hrs:  (P) 102  Home: Januvia 100mg daily, metformin 1000mg daily  Here: Humalog 5U 3x daily with meals. Continue accuchecks and SSI.  Will monitor and work back to home regimen.              History of Present Illness:   History of Present Illness:   Lona Cedeno is a 78 y.o. female, with COPD, DM type 2, HTN, tobacco abuse, mood disorder,  PAD and bladder neoplasm, who presented 1/23/24 to [facility] with a diabetic ulcer of the toe and Rt ankle would with exposure of the Achilles tendon from the podiatry office. She was following with Dr. Gann of Podiatry and had debridement 1/9/24. Work-up revealed significant aortoiliac disease and inadequate healing potential. She was initially started on abx which were stopped after the pt was seen by ID as she did not have an active infection. She was transferred to [facility] for revascularization. She went to the OR 1/31/24 for Rt bypass axillo-femoral. She went to the OR again 2/4/24 for Rt wound and Achilles debridement foot/toe, partial amputation distal 2nd toe, VAC placement and evacuation/drainage of Rt chest hematoma with Podiatry and Vascular. The chest hematoma/seroma did re accumulate and further surgery was not thought to be helpful. It was determined that she would not require any further surgical intervention. She was discharged to the [facility] with a wound VAC and a plan to follow-up with wound care in the outpt setting for possible split thickness skin graft.      Review of Systems: A 10 point ROS was performed; negative except as noted above.       Social History:    Substance Use History:   Social History     Substance and Sexual Activity   Alcohol Use Not Currently     Social History     Tobacco Use   Smoking Status Every Day    Current packs/day: 1.00    Average packs/day: 1 pack/day for 63.1 years (63.1 ttl pk-yrs)    Types: Cigarettes    Start date: 1961   Smokeless Tobacco Never   Tobacco Comments    11/14/23 smokes about 3/4 PPD     Social History     Substance and Sexual Activity   Drug Use Never       Family History:    Family History   Problem Relation Age of Onset    COPD Mother     Emphysema Mother     COPD Father     Emphysema Father          Review of Scheduled Meds:  Current Facility-Administered Medications   Medication Dose Route Frequency Provider Last Rate    acetaminophen   975 mg Oral Q8H JILLIAN Denisse Berger PA-C      albuterol  2 puff Inhalation Q6H PRN Denisse Berger PA-C      [START ON 2/10/2024] AMILoride  5 mg Oral Daily Denisse Berger PA-C      [START ON 2/10/2024] aspirin  81 mg Oral Daily Denisse Berger PA-C      atorvastatin  10 mg Oral Daily With Dinner Denisse Berger PA-C      [START ON 2/10/2024] bisacodyl  10 mg Rectal Daily Denisse Berger PA-C      [START ON 2/10/2024] buPROPion  300 mg Oral Daily Denisse Berger PA-C      clonazePAM  1 mg Oral QPM Denisse Berger PA-C      docusate sodium  100 mg Oral BID Denisse Berger PA-C      [START ON 2/10/2024] fish oil  1,000 mg Oral Daily Denisse Berger PA-C      [START ON 2/10/2024] fluticasone-vilanterol  1 puff Inhalation Daily Denisse Berger PA-C      insulin lispro  1-5 Units Subcutaneous TID AC Denisse Berger PA-C      insulin lispro  1-5 Units Subcutaneous HS Denisse Berger PA-C      insulin lispro  5 Units Subcutaneous TID With Meals Denisse Berger PA-C      methocarbamol  250 mg Oral Q8H JILLIAN Denisse Berger PA-C      [START ON 2/10/2024] nicotine  1 patch Transdermal Daily Denisse Berger PA-C      ondansetron  4 mg Oral Q6H PRN Denisse Berger PA-C      [START ON 2/10/2024] oxybutynin  5 mg Oral Daily Denisse Berger PA-C      oxyCODONE  2.5 mg Oral Q4H PRN Denisse Berger PA-C      Or    oxyCODONE  5 mg Oral Q4H PRN Denisse Berger PA-C      [START ON 2/10/2024] pantoprazole  40 mg Oral Early Morning Denisse Berger PA-C      [START ON 2/10/2024] polyethylene glycol  17 g Oral Daily Denisse Berger PA-C      pregabalin  200 mg Oral TID Denisse Berger PA-C      rivaroxaban  2.5 mg Oral BID With Meals Denisse Berger PA-C      senna  2 tablet Oral HS Denisse Berger PA-C         Physical Exam:  Temp:  [97.7 °F (36.5 °C)-98.9 °F (37.2 °C)] 97.7 °F (36.5 °C)  HR:  [72-82] 72  Resp:   "[16-17] 16  BP: (124-169)/() 124/66  SpO2:  [90 %-96 %] 96 %    General: alert, no apparent distress, cooperative, and comfortable  HEENT:  Head: Normocephalic, no lesions, without obvious abnormality.  Eye: Normal external eye, conjunctiva, lidsc cornea  Ears: Normal external ears  Nose: Normal external nose, mucus membranes  CARDIAC:  regular rate and rhythm, S1, S2 normal, no murmur, click, rub or gallop  LUNGS:  no abnormal respiratory pattern, no retractions noted, non-labored breathing  and distant breath sounds  ABDOMEN:  soft, non-tender, non-distended  EXTREMITIES:  VAC with serosanguinous drainage, Rt LE dressed.  NEURO:  clear speech, following all commands, oriented x3  PSYCH:  Alert and oriented, appropriate affect.  INCISION:  dressings present    Laboratory:    Results from last 7 days   Lab Units 02/09/24  0540 02/07/24  0935 02/06/24  0458   HEMOGLOBIN g/dL 10.3* 10.2* 9.9*   HEMATOCRIT % 32.3* 32.0* 31.7*   WBC Thousand/uL 9.90 10.39* 9.21     Results from last 7 days   Lab Units 02/09/24  0540 02/07/24  0935 02/06/24  0458 02/05/24  0531 02/04/24  0440   BUN mg/dL 14 16 11 13 12   SODIUM mmol/L 138 137 138 137 134*   POTASSIUM mmol/L 4.4 4.4 4.1 4.3 4.1   CHLORIDE mmol/L 100 101 103 100 99   CREATININE mg/dL 0.59* 0.62 0.50* 0.51* 0.50*   AST U/L  --   --   --  11* 11*   ALT U/L  --   --   --  3* 4*     Results from last 7 days   Lab Units 02/04/24  0440 02/03/24  1850   PROTIME seconds 13.4 14.2   INR  1.03 1.11        Wt Readings from Last 1 Encounters:   02/09/24 69.5 kg (153 lb 3.5 oz)     Estimated body mass index is 28.95 kg/m² as calculated from the following:    Height as of this encounter: 5' 1\" (1.549 m).    Weight as of this encounter: 69.5 kg (153 lb 3.5 oz).    Imaging:  No orders to display       Level 1 - Full Code    Counseling / Coordination of Care:   Total floor / unit time spent today 60 minutes. and Greater than 50% of total time was spent with the patient and / or " family counseling and / or coordination of care.    ** Please Note: Fluency Direct voice to text software may have been used in the creation of this document. **

## 2024-02-09 NOTE — CASE MANAGEMENT
Case Management Discharge Planning Note    Patient name Lona Cedeno  [facility] MRN [id number]  : 1945 Date 2024       Current Admission Date: 2024  Current Admission Diagnosis:Diabetic foot ulcer with osteomyelitis (HCC)   Patient Active Problem List    Diagnosis Date Noted    Hematoma 2024    Anemia 2024    Constipation 2024    Preoperative cardiovascular examination 2024    Diabetic foot ulcer with osteomyelitis (HCC) 2024    Ankle ulcer, right, with fat layer exposed ([facility]) 2023    Age-related osteoporosis without current pathological fracture 2023    Abnormal CT of the chest 10/17/2023    COPD, severity to be determined ([facility]) 10/17/2023    Tobacco use disorder 10/17/2023    PAD (peripheral artery disease) ([facility]) 10/10/2023    Bladder neoplasm 2023    Left renal mass 2023    Lactic acidosis 2023    Hypomagnesemia 2023    Degenerative lumbar disc 2023    Urinary incontinence 2023    GERD without esophagitis 10/16/2019    Medicare annual wellness visit, subsequent 2019    Essential hypertension 02/15/2019    Anxiety and depression 02/15/2019    Type 2 diabetes mellitus with diabetic polyneuropathy ([facility]) 2019      LOS (days): 14  Geometric Mean LOS (GMLOS) (days): 3.8  Days to GMLOS:-9.7     OBJECTIVE:  Risk of Unplanned Readmission Score: 20.63         Current admission status: Inpatient   Preferred Pharmacy:   [facility] [facility] [address]  Phone: [phone] Fax: [phone]    [facility] [place] [facility] [address]   [address]  [place] [address]  Phone: [phone] Fax: [phone]    Primary Care Provider: Vivek Preston DO    Primary Insurance: [facility] REP  Secondary Insurance:     DISCHARGE DETAILS:                                                                                                                Facility Insurance Auth Number: [id number]

## 2024-02-09 NOTE — ASSESSMENT & PLAN NOTE
Home: amiloride 5mg daily, lisinopril 5mg daily  Here: amiloride 5mg daily  Monitor BP and adjust medications as necessary.

## 2024-02-09 NOTE — CONSULTS
Patient is currently being followed by podiatry.  See progress note from earlier today, 2/9/24, prior to transfer to [facility].    Plan for next evaluation with wound VAC change on 2/12/2024

## 2024-02-09 NOTE — CASE MANAGEMENT
Case Management Discharge Planning Note    Patient name Lona Cedeno  [facility] MRN [id number]  : 1945 Date 2024       Current Admission Date: 2024  Current Admission Diagnosis:Diabetic foot ulcer with osteomyelitis (HCC)   Patient Active Problem List    Diagnosis Date Noted    Hematoma 2024    Anemia 2024    Constipation 2024    Preoperative cardiovascular examination 2024    Diabetic foot ulcer with osteomyelitis (HCC) 2024    Ankle ulcer, right, with fat layer exposed ([facility]) 2023    Age-related osteoporosis without current pathological fracture 2023    Abnormal CT of the chest 10/17/2023    COPD, severity to be determined ([facility]) 10/17/2023    Tobacco use disorder 10/17/2023    PAD (peripheral artery disease) ([facility]) 10/10/2023    Bladder neoplasm 2023    Left renal mass 2023    Lactic acidosis 2023    Hypomagnesemia 2023    Degenerative lumbar disc 2023    Urinary incontinence 2023    GERD without esophagitis 10/16/2019    Medicare annual wellness visit, subsequent 2019    Essential hypertension 02/15/2019    Anxiety and depression 02/15/2019    Type 2 diabetes mellitus with diabetic polyneuropathy ([facility]) 2019      LOS (days): 14  Geometric Mean LOS (GMLOS) (days): 3.8  Days to GMLOS:-9.9     OBJECTIVE:  Risk of Unplanned Readmission Score: 20.68         Current admission status: Inpatient   Preferred Pharmacy:   [id number] [address]  Phone: [phone] Fax: [phone]    [facility] [place] [facility] [address]   [address] [facility] [address]  [place] [address]  Phone: [phone] Fax: [phone]    Primary Care Provider: Vivek Preston DO    Primary Insurance: [facility] RE[facility]  Secondary Insurance:     DISCHARGE DETAILS:    Other Referral/Resources/Interventions Provided:  Interventions: Acute  Rehab  Referral Comments: Pt has been accpeted to [facility]. Insurance auth recevied    Treatment Team Recommendation: Acute Rehab  Discharge Destination Plan:: Acute Rehab    ETA of Transport (Date): 02/09/24  ETA of Transport (Time): 1400     Transfer Mode:  (Internal transport)    Accepting Facility Name, City & State : [facility]  Receiving Facility/Agency Phone Number: Please contact: [facility]- Charge RN-[facility] via TT     Facility Insurance Auth Number: [id number]    Pt and her family aware and in agreement w/ Pts discharge to [facility] today at 2pm.     RN and [facility] provider aware.     For RN report please contact: [facility] via TT.

## 2024-02-09 NOTE — ASSESSMENT & PLAN NOTE
Transferred to [facility] for vascular eval/ongoing podiatry eval   S/p right Ax to fem bypass on 1/31  S/p right foot wound and Achilles debridement with second toe partial amputation and VAC placement   Discussed with vascular surgery and no further vascular surgery intervention planned  Reviewed plastic surgery evaluation unfortunately patient is not a candidate for free muscle flap at this time and will benefit from STSG for skin graft substitute  Monitoring off antibiotics per ID evaluation at [facility]  Discussed with podiatry and patient will not need further OR interventions during this admission.  She will be discharged on wound VAC with follow-up with wound care and evaluation in the outpatient setting for possible split-thickness skin graft down the line.  PT/OT evaluation with level 2 recommendation. PM&R accepted to [facility], awaiting insurance Auth.  Patient medically stable  NWB on right foot. Fall precautions

## 2024-02-09 NOTE — ASSESSMENT & PLAN NOTE
- Now monitoring off antibiotics per prior ID recommendations. S/p right axillary to femoral bypass on 1/31/24 as well as right foot wound and Achilles debridement with 2nd digit partial amputation and VAC placement on 2/4/24  - Trans-tibial amputation has been deferred at this time while monitoring on re-vascularization  - Podiatry and plastic surgery discussed case, no further OR interventions are planned during this admission, split-thickness skin graft may be considered in the future.   - Continue PT and OT  - [facility]

## 2024-02-09 NOTE — PROGRESS NOTES
[facility]  H&P  Name: Lona Cedeno I  MRN: [id number]  Unit/Bed#: -01 I Date of Admission: 2/9/2024   Date of Service: 2/9/2024 I Hospital Day: 0    Assessment/Plan   * Diabetic foot ulcer with osteomyelitis (HCC)  Assessment & Plan  Lab Results   Component Value Date    HGBA1C 6.5 (H) 11/07/2023       Recent Labs     02/08/24  1611 02/08/24 2028 02/09/24  0623 02/09/24  1057   POCGLU 114 136 165* 123       Blood Sugar Average: Last 72 hrs:    Podiatry to follow  Continue VAC dressing.  Follow-up outpt for potential split thickness skin graft.  Observe off abx.      Anemia  Assessment & Plan  Hgb stable post-op.  Continue to monitor.    Tobacco use disorder  Assessment & Plan  Continue Nicotine patch.  Cessation encouraged.    COPD, severity to be determined ([facility])  Assessment & Plan  Continue inhalers.  Pt uses Advair 250/50 at home.  Monitor respiratory status.    PAD (peripheral artery disease) ([facility])  Assessment & Plan  Continue ASA and atorvastatin.  Encourage tobacco cessation.    Anxiety and depression  Assessment & Plan  Continue home medications.    Essential hypertension  Assessment & Plan  Home: amiloride 5mg daily, lisinopril 5mg daily  Here: amiloride 5mg daily  Monitor BP and adjust medications as necessary.    Type 2 diabetes mellitus with diabetic polyneuropathy (HCC)  Assessment & Plan  Lab Results   Component Value Date    HGBA1C 6.5 (H) 11/07/2023       Recent Labs     02/08/24  1611 02/08/24 2028 02/09/24  0623 02/09/24  1057   POCGLU 114 136 165* 123       Blood Sugar Average: Last 72 hrs:    Home: Januvia 100mg daily, metformin 1000mg daily  Here: Humalog 5U 3x daily with meals. Continue accuchecks and SSI.  Will monitor and work back to home regimen.         History of Present Illness:   Lona Cedeno is a 78 y.o. female, with COPD, DM type 2, HTN, tobacco abuse, mood disorder, PAD and bladder neoplasm, who presented 1/23/24 to [facility] with a  diabetic ulcer of the toe and Rt ankle would with exposure of the Achilles tendon from the podiatry office. She was following with Dr. Gann of Podiatry and had debridement 1/9/24. Work-up revealed significant aortoiliac disease and inadequate healing potential. She was initially started on abx which were stopped after the pt was seen by ID as she did not have an active infection. She was transferred to [facility] for revascularization. She went to the OR 1/31/24 for Rt bypass axillo-femoral. She went to the OR again 2/4/24 for Rt wound and Achilles debridement foot/toe, partial amputation distal 2nd toe, VAC placement and evacuation/drainage of Rt chest hematoma with Podiatry and Vascular. The chest hematoma/seroma did re accumulate and further surgery was not thought to be helpful. It was determined that she would not require any further surgical intervention. She was discharged to the [facility] with a wound VAC and a plan to follow-up with wound care in the outpt setting for possible split thickness skin graft.      Review of Systems: A 10 point ROS was performed; negative except as noted above.       Social History:    Substance Use History:   Social History     Substance and Sexual Activity   Alcohol Use Not Currently     Social History     Tobacco Use   Smoking Status Every Day    Current packs/day: 1.00    Average packs/day: 1 pack/day for 63.1 years (63.1 ttl pk-yrs)    Types: Cigarettes    Start date: 1961   Smokeless Tobacco Never   Tobacco Comments    11/14/23 smokes about 3/4 PPD     Social History     Substance and Sexual Activity   Drug Use Never       Family History:    Family History   Problem Relation Age of Onset    COPD Mother     Emphysema Mother     COPD Father     Emphysema Father          Review of Scheduled Meds:  Current Facility-Administered Medications   Medication Dose Route Frequency Provider Last Rate    acetaminophen  975 mg Oral Q8H JILLIAN Berger PA-C      albuterol  2 puff  Inhalation Q6H PRN Denisse Berger PA-C      [START ON 2/10/2024] AMILoride  5 mg Oral Daily Denisse Berger PA-C      [START ON 2/10/2024] aspirin  81 mg Oral Daily Denisse Berger PA-C      atorvastatin  10 mg Oral Daily With Dinner Denisse Berger PA-C      [START ON 2/10/2024] bisacodyl  10 mg Rectal Daily Denisse Berger PA-C      [START ON 2/10/2024] buPROPion  300 mg Oral Daily Denisse Berger PA-C      clonazePAM  1 mg Oral QPM Denisse Berger PA-C      docusate sodium  100 mg Oral BID Denisse Berger PA-C      [START ON 2/10/2024] fish oil  1,000 mg Oral Daily Denisse Berger PA-C      [START ON 2/10/2024] fluticasone-vilanterol  1 puff Inhalation Daily Denisse Berger PA-C      insulin lispro  1-5 Units Subcutaneous TID AC Denisse Berger PA-C      insulin lispro  1-5 Units Subcutaneous HS Denisse Berger PA-C      insulin lispro  5 Units Subcutaneous TID With Meals Denisse Berger PA-C      methocarbamol  250 mg Oral Q8H JILLIAN Denisse Berger PA-C      [START ON 2/10/2024] nicotine  1 patch Transdermal Daily Denisse Berger PA-C      ondansetron  4 mg Oral Q6H PRN Denisse Berger PA-C      [START ON 2/10/2024] oxybutynin  5 mg Oral Daily Denisse Berger PA-C      oxyCODONE  2.5 mg Oral Q4H PRN Denisse Berger PA-C      Or    oxyCODONE  5 mg Oral Q4H PRN Denisse Berger PA-C      [START ON 2/10/2024] pantoprazole  40 mg Oral Early Morning Denisse Berger PA-C      [START ON 2/10/2024] polyethylene glycol  17 g Oral Daily Denisse Berger PA-C      pregabalin  200 mg Oral TID Denisse Berger PA-C      rivaroxaban  2.5 mg Oral BID With Meals Denisse Berger PA-C      senna  2 tablet Oral HS Denisse Berger PA-C         Physical Exam:  Temp:  [97.7 °F (36.5 °C)-98.9 °F (37.2 °C)] 97.7 °F (36.5 °C)  HR:  [72-82] 72  Resp:  [16-17] 16  BP: (124-169)/() 124/66  SpO2:  [90 %-96 %] 96  "%    General: alert, no apparent distress, cooperative, and comfortable  HEENT:  Head: Normocephalic, no lesions, without obvious abnormality.  Eye: Normal external eye, conjunctiva, lidsc cornea  Ears: Normal external ears  Nose: Normal external nose, mucus membranes  CARDIAC:  regular rate and rhythm, S1, S2 normal, no murmur, click, rub or gallop  LUNGS:  no abnormal respiratory pattern, no retractions noted, non-labored breathing  and distant breath sounds  ABDOMEN:  soft, non-tender, non-distended  EXTREMITIES:  no edema, Rt Le dressed  NEURO:  clear speech, following all commands, oriented x3  PSYCH:  Alert and oriented, appropriate affect.  INCISION:  dressings present    Laboratory:    Results from last 7 days   Lab Units 02/09/24  0540 02/07/24  0935 02/06/24  0458   HEMOGLOBIN g/dL 10.3* 10.2* 9.9*   HEMATOCRIT % 32.3* 32.0* 31.7*   WBC Thousand/uL 9.90 10.39* 9.21     Results from last 7 days   Lab Units 02/09/24  0540 02/07/24  0935 02/06/24  0458 02/05/24  0531 02/04/24  0440   BUN mg/dL 14 16 11 13 12   SODIUM mmol/L 138 137 138 137 134*   POTASSIUM mmol/L 4.4 4.4 4.1 4.3 4.1   CHLORIDE mmol/L 100 101 103 100 99   CREATININE mg/dL 0.59* 0.62 0.50* 0.51* 0.50*   AST U/L  --   --   --  11* 11*   ALT U/L  --   --   --  3* 4*     Results from last 7 days   Lab Units 02/04/24  0440 02/03/24  1850   PROTIME seconds 13.4 14.2   INR  1.03 1.11        Wt Readings from Last 1 Encounters:   02/09/24 69.5 kg (153 lb 3.5 oz)     Estimated body mass index is 28.95 kg/m² as calculated from the following:    Height as of this encounter: 5' 1\" (1.549 m).    Weight as of this encounter: 69.5 kg (153 lb 3.5 oz).    Imaging:  No orders to display       Level 1 - Full Code    Counseling / Coordination of Care:   Total floor / unit time spent today 60 minutes. and Greater than 50% of total time was spent with the patient and / or family counseling and / or coordination of care.     ** Please Note: Fluency Direct voice to " text software may have been used in the creation of this document. **

## 2024-02-10 LAB
GLUCOSE SERPL-MCNC: 153 MG/DL (ref 65–140)
GLUCOSE SERPL-MCNC: 166 MG/DL (ref 65–140)
GLUCOSE SERPL-MCNC: 177 MG/DL (ref 65–140)
GLUCOSE SERPL-MCNC: 207 MG/DL (ref 65–140)

## 2024-02-10 PROCEDURE — 97116 GAIT TRAINING THERAPY: CPT

## 2024-02-10 PROCEDURE — 99232 SBSQ HOSP IP/OBS MODERATE 35: CPT | Performed by: INTERNAL MEDICINE

## 2024-02-10 PROCEDURE — 97110 THERAPEUTIC EXERCISES: CPT

## 2024-02-10 PROCEDURE — 97162 PT EVAL MOD COMPLEX 30 MIN: CPT

## 2024-02-10 PROCEDURE — 97166 OT EVAL MOD COMPLEX 45 MIN: CPT

## 2024-02-10 PROCEDURE — 97535 SELF CARE MNGMENT TRAINING: CPT

## 2024-02-10 PROCEDURE — 97530 THERAPEUTIC ACTIVITIES: CPT

## 2024-02-10 PROCEDURE — 82948 REAGENT STRIP/BLOOD GLUCOSE: CPT

## 2024-02-10 RX ADMIN — INSULIN LISPRO 5 UNITS: 100 INJECTION, SOLUTION INTRAVENOUS; SUBCUTANEOUS at 10:10

## 2024-02-10 RX ADMIN — ACETAMINOPHEN 975 MG: 325 TABLET, FILM COATED ORAL at 06:11

## 2024-02-10 RX ADMIN — RIVAROXABAN 2.5 MG: 2.5 TABLET, FILM COATED ORAL at 10:09

## 2024-02-10 RX ADMIN — AMILORIDE HYDROCLORIDE 5 MG: 5 TABLET ORAL at 10:10

## 2024-02-10 RX ADMIN — DOCUSATE SODIUM 100 MG: 100 CAPSULE, LIQUID FILLED ORAL at 17:57

## 2024-02-10 RX ADMIN — DOCUSATE SODIUM 100 MG: 100 CAPSULE, LIQUID FILLED ORAL at 10:08

## 2024-02-10 RX ADMIN — OXYBUTYNIN CHLORIDE 5 MG: 5 TABLET, EXTENDED RELEASE ORAL at 10:08

## 2024-02-10 RX ADMIN — NICOTINE 1 PATCH: 21 PATCH, EXTENDED RELEASE TRANSDERMAL at 10:08

## 2024-02-10 RX ADMIN — INSULIN LISPRO 1 UNITS: 100 INJECTION, SOLUTION INTRAVENOUS; SUBCUTANEOUS at 11:35

## 2024-02-10 RX ADMIN — ACETAMINOPHEN 975 MG: 325 TABLET, FILM COATED ORAL at 21:31

## 2024-02-10 RX ADMIN — ACETAMINOPHEN 975 MG: 325 TABLET, FILM COATED ORAL at 14:38

## 2024-02-10 RX ADMIN — INSULIN LISPRO 1 UNITS: 100 INJECTION, SOLUTION INTRAVENOUS; SUBCUTANEOUS at 21:33

## 2024-02-10 RX ADMIN — INSULIN LISPRO 1 UNITS: 100 INJECTION, SOLUTION INTRAVENOUS; SUBCUTANEOUS at 06:45

## 2024-02-10 RX ADMIN — OMEGA-3 FATTY ACIDS CAP 1000 MG 1000 MG: 1000 CAP at 10:09

## 2024-02-10 RX ADMIN — METHOCARBAMOL 250 MG: 500 TABLET ORAL at 14:38

## 2024-02-10 RX ADMIN — ATORVASTATIN CALCIUM 10 MG: 10 TABLET, FILM COATED ORAL at 16:30

## 2024-02-10 RX ADMIN — FLUTICASONE FUROATE AND VILANTEROL TRIFENATATE 1 PUFF: 200; 25 POWDER RESPIRATORY (INHALATION) at 10:09

## 2024-02-10 RX ADMIN — CLONAZEPAM 1 MG: 1 TABLET ORAL at 17:57

## 2024-02-10 RX ADMIN — PANTOPRAZOLE SODIUM 40 MG: 40 TABLET, DELAYED RELEASE ORAL at 10:08

## 2024-02-10 RX ADMIN — METFORMIN HYDROCHLORIDE 500 MG: 500 TABLET ORAL at 16:30

## 2024-02-10 RX ADMIN — METHOCARBAMOL 250 MG: 500 TABLET ORAL at 21:31

## 2024-02-10 RX ADMIN — PREGABALIN 200 MG: 100 CAPSULE ORAL at 21:31

## 2024-02-10 RX ADMIN — INSULIN LISPRO 1 UNITS: 100 INJECTION, SOLUTION INTRAVENOUS; SUBCUTANEOUS at 16:30

## 2024-02-10 RX ADMIN — PREGABALIN 200 MG: 100 CAPSULE ORAL at 16:30

## 2024-02-10 RX ADMIN — INSULIN LISPRO 5 UNITS: 100 INJECTION, SOLUTION INTRAVENOUS; SUBCUTANEOUS at 11:35

## 2024-02-10 RX ADMIN — ASPIRIN 81 MG: 81 TABLET, COATED ORAL at 10:08

## 2024-02-10 RX ADMIN — METHOCARBAMOL 250 MG: 500 TABLET ORAL at 06:11

## 2024-02-10 RX ADMIN — PREGABALIN 200 MG: 100 CAPSULE ORAL at 10:08

## 2024-02-10 RX ADMIN — BUPROPION HYDROCHLORIDE 300 MG: 150 TABLET, EXTENDED RELEASE ORAL at 10:09

## 2024-02-10 RX ADMIN — RIVAROXABAN 2.5 MG: 2.5 TABLET, FILM COATED ORAL at 17:57

## 2024-02-10 NOTE — PROGRESS NOTES
[facility] PT GOALS     02/10/24 1235   Rehab Team Goals   Transfer Team Goal Patient will be independent with transfers with least restrictive device upon completion of rehab program   Locomotion Team Goal Patient will be independent with locomotion with least restrictive device upon completion of rehab program   Rehab Team Interventions   PT Interventions Gait Training;Therapeutic Exercise;Neuromuscualr Reeducation;Transfer Training;Bed Mobility;Patient/Family Education;Wheelchair Mobility   PT Transfer Goal   Roll left and right Goal 06. Independent - Patient completes the activity by him/herself with no assistance from a helper.   Sit to lying Goal 06. Independent - Patient completes the activity by him/herself with no assistance from a helper.   Lying to sitting on side of bed Goal 06. Independent - Patient completes the activity by him/herself with no assistance from a helper.   Sit to stand Goal 06. Independent - Patient completes the activity by him/herself with no assistance from a helper.   Chair/bed-to-chair transfer Goal 06. Independent - Patient completes the activity by him/herself with no assistance from a helper.   Car Transfer Goal 04. Supervision or touching assistance- [place] provides VERBAL CUES or supervision throughout activity.   Assistive Device   (LRAD)   Safety Precautions NWB  (RLE)   Environment Level Surface;Well Lit;Tile Floor   Status Ongoing;Target goal - two weeks   Locomotion Goal   Primary discharge mode of locomotion Both   Target Walk Distance 25 ft  (distances may be limited due to chronic R shoulder pain and L foot integrity making hopping difficult)   Assist Device Roller Walker   Environment Level Surface;Well Lit;Tile Floor   Walk 10 feet Goal 06. Independent - Patient completes the activity by him/herself with no assistance from a helper.   Walk 50 feet with 2 turns Goal 09. Not applicable   Walk 150 feet Goal 09. Not applicable   Walking 10 feet on uneven surface 04. Supervision  or touching assistance- [place] provides VERBAL CUES or supervision throughout activity.   Walking Goal Status Ongoing;Target goal - two weeks   Type of Wheelchair Used 1. Manual   Target Wheel Distance- Level 150 ft   Wheel 50 feet with 2 turns Goal 06. Independent - Patient completes the activity by him/herself with no assistance from a helper.   Wheel 150 feet Goal 06. Independent - Patient completes the activity by him/herself with no assistance from a helper.   Decrease Assist With Locking Brakes;Remove Leg Rest;Replace Leg Rest   Environment Level Surface;Well Lit;Tile Floor   Stairs Goal   1 step or curb goal 04. TOUCHING/ STEADYING assistance as patient completes activity.   4 steps Goal 04. TOUCHING/ STEADYING assistance as patient completes activity.   12 steps Goal 09. Not applicable   Number of Stairs 5  (5 at a time, 10 total)   Hand Rail Bilateral   Status Ongoing;Target goal - two weeks   Object Retrieval Goal   Picking up object Goal 06. Independent - Patient completes the activity by him/herself with no assistance from a helper.   Assistive Device  Reacher   Small Object Picked Up marker

## 2024-02-10 NOTE — ASSESSMENT & PLAN NOTE
Lab Results   Component Value Date    HGBA1C 6.5 (H) 11/07/2023       Recent Labs     02/09/24  1532 02/09/24  2116 02/10/24  0617 02/10/24  1036   POCGLU 102 210* 153* 166*         Blood Sugar Average: Last 72 hrs:  (P) 157.75  Podiatry to follow  Continue VAC dressing.  Follow-up outpt for potential split thickness skin graft.  Observe off abx.

## 2024-02-10 NOTE — ASSESSMENT & PLAN NOTE
Home: amiloride 5mg daily, lisinopril 5mg daily  Here: amiloride 5mg daily  Monitor BP and adjust medications as necessary.  BP stable.

## 2024-02-10 NOTE — DISCHARGE SUMMARY
[facility]  Discharge- Lona Cedeno 1945, 78 y.o. female MRN: [id number]  Unit/Bed#: [facility] 503-01 Encounter: [id number]  Primary Care Provider: Vivek Preston DO   Date and time admitted to hospital: 1/26/2024  8:47 PM    * Diabetic foot ulcer with osteomyelitis (HCC)  Assessment & Plan  Transferred to [facility] for vascular eval/ongoing podiatry eval   S/p right Ax to fem bypass on 1/31  S/p right foot wound and Achilles debridement with second toe partial amputation and VAC placement   Discussed with vascular surgery and no further vascular surgery intervention planned  Reviewed plastic surgery evaluation unfortunately patient is not a candidate for free muscle flap at this time and will benefit from STSG for skin graft substitute  Monitoring off antibiotics per ID evaluation at [facility]  Discussed with podiatry and patient will not need further OR interventions during this admission. Since she is staying at [facility] they continue wound VAC and readmit down the line if needed for split-thickness skin graft down the line.  Discharged to [facility] today  NWB on right foot. Fall precautions    Anemia  Assessment & Plan  Likely acute blood loss from wound, acute on chronic inflammation   Baseline 12-14  Monitor and transfuse if <7    Hematoma  Assessment & Plan  Over right chest wall. Possible seroma  S/p evacuation by vascular surgery on 2/4  Discussed with vascular surgery okay to restart DVT prophylaxis  Stable    Constipation  Assessment & Plan  Reports normal BM this morning  Continue MiraLAX and senna    Tobacco use disorder  Assessment & Plan  Educated on tobacco cessation  Continue nicotine patch    COPD, severity to be determined ([facility])  Assessment & Plan  Continue inhalers  Not in exacerbation  O2 sat 87-88% overnight, placed on 2 L nasal cannula.  Now on room air  Supplemental O2 to keep O2 sats between 88-92%    PAD (peripheral artery disease) ([facility])  Assessment & Plan  D/w  vascular surgery. Started on Xarelto 2.5 mg BID  Continue Aspirin and statin  S/p bypass  Encouraged tobacco cessation    Anxiety and depression  Assessment & Plan  Continue bupropion and clonazepam at bedtime    Essential hypertension  Assessment & Plan  Continue amiloride  BP reviewed and stable    Type 2 diabetes mellitus with diabetic polyneuropathy (HCC)  Assessment & Plan  Lab Results   Component Value Date    HGBA1C 6.5 (H) 11/07/2023       Recent Labs     02/09/24  1532 02/09/24  2116 02/10/24  0617 02/10/24  1036   POCGLU 102 210* 153* 166*         Blood Sugar Average: Last 72 hrs:  (P) 141.3  Outpatient regimen metformin 1000 mg twice daily and Januvia  Continue Lyrica  Accu-Cheks reviewed and glucose stable while inpatient        Medical Problems       Resolved Problems  Date Reviewed: 2/10/2024   None       Discharging Physician / Practitioner: Ofe Wood MD  PCP: Vivek Preston DO  Admission Date:   Admission Orders (From admission, onward)       Ordered        01/26/24 2056  Inpatient Admission  Once                          Discharge Date: 02/09/24    Consultations During Hospital Stay:  Vascular surgery   Podiatry  PM&R  Acute Pain  Plastic surgery    Procedures Performed:   Fem Bypass  I&D  Toe amputation    Significant Findings / Test Results:   None    Incidental Findings:   None    Test Results Pending at Discharge (will require follow up):   None     Outpatient Tests Requested:  None    Complications:  Hematoma    Reason for Admission: Foot wound    Hospital Course:   Lona Cedeno is a 78 y.o. female patient who originally presented to the hospital on 1/26/2024  from her podiatry office with a diabetic ulcer of the right toe and right ankle wound with exposed achilles tendon. On admission MRI confirmed osteomyelitis of the right foot and was transferred to [place] [facility] for consideration of revascularization and podiatry evaluation. She was evaluated by vascular surgery and underwent  "extra-anatomic bypass of the right axillary to femoral artery on 1/31/2024. A right trans-tibial amputation was initially discussed, but decision was made to attempt limb salvage.On 2/4/24 patient then had a right foot wound and Achilles debridement with 2nd toe partial amputation and VAC placement. Podiatry continued to do wound care and a split-thickness skin graft was discussed among podiatry and plastic surgery, but was deferred to a later time. Her hospital course was complicated by right chest wall hematoma s/p evacuation by vascular on 2/4/2024. VAC was last changed on 2/8/24 and patient was transferred to [facility] as no further OR interventions planned at this time.    Please see above list of diagnoses and related plan for additional information.     Condition at Discharge: stable    Discharge Day Visit / Exam:   Subjective:  Patient feels well. Has no complaints. Tolerating oral intake. Denies pain over right foot.   Vitals: Blood Pressure: 125/56 (02/09/24 0734)  Pulse: 73 (02/09/24 0734)  Temperature: 98 °F (36.7 °C) (02/09/24 0734)  Temp Source: Oral (02/09/24 0734)  Respirations: 16 (02/09/24 0734)  Height: 5' 1\" (154.9 cm) (01/30/24 2152)  Weight - Scale: 66.1 kg (145 lb 11.6 oz) (01/30/24 2152)  SpO2: 92 % (02/09/24 0734)  Exam:   Physical Exam  Vitals and nursing note reviewed.   Constitutional:       General: She is not in acute distress.     Appearance: She is well-developed.   HENT:      Head: Normocephalic and atraumatic.   Eyes:      Conjunctiva/sclera: Conjunctivae normal.   Cardiovascular:      Rate and Rhythm: Normal rate and regular rhythm.   Pulmonary:      Effort: Pulmonary effort is normal. No respiratory distress.      Breath sounds: Normal breath sounds. No wheezing or rales.   Abdominal:      Palpations: Abdomen is soft.      Tenderness: There is no abdominal tenderness.   Musculoskeletal:         General: No swelling.      Cervical back: Neck supple.      Comments: RLE with wound VAC "   Skin:     General: Skin is warm and dry.   Neurological:      Mental Status: She is alert.              Discussion with Family: Updated  (daughter) at bedside.    Discharge instructions/Information to patient and family:   See after visit summary for information provided to patient and family.      Provisions for Follow-Up Care:  See after visit summary for information related to follow-up care and any pertinent home health orders.      Mobility at time of Discharge:   Basic Mobility Inpatient Raw Score: 15  JH-HLM Goal: 4: Move to chair/commode  JH-HLM Achieved: 4: Move to chair/commode  HLM Goal achieved. Continue to encourage appropriate mobility.     Disposition:   Acute Rehab at [facility]    Planned Readmission: None     Discharge Statement:  I spent 35 minutes discharging the patient. This time was spent on the day of discharge. I had direct contact with the patient on the day of discharge. Greater than 50% of the total time was spent examining patient, answering all patient questions, arranging and discussing plan of care with patient as well as directly providing post-discharge instructions.  Additional time then spent on discharge activities.    Discharge Medications:  See after visit summary for reconciled discharge medications provided to patient and/or family.      **Please Note: This note may have been constructed using a voice recognition system**

## 2024-02-10 NOTE — PROGRESS NOTES
OT LTGs: 2 week ELOS     02/10/24 0830   Rehab Team Goals   ADL Team Goal Patient will be independent with ADLs with least restrictive device upon completion of rehab program   Rehab Team Interventions   OT Interventions Self Care;Home Management;Therapeutic Exercise;Energy Conservation;Patient/Family Education   Eating Goal   Eating Goal 06. Independent - Patient completes the activity by him/herself with no assistance from a helper.   Meal Complete All meals   Status Target goal - two weeks   Interventions Assistive Device;Compensation Strategies;Dysphagia Education;Neuromuscular Education;Optimal Position   Grooming Goal   Oral Hygiene Goal 06. Independent - Patient completes the activity by him/herself with no assistance from a helper.   Task Wash/Dry Face;Wash/Dry Hands;Brush Teeth;Comb Hair;Initiate Task;Complete Groom   Environment Seated in Chair;Seated at Sink;Unsupported sit   Status Target goal - two weeks   Intervention Assistive Device;Balance Work;Neuromuscular Education;Therapeutic Exercise;Tolerance Work   Tub/Shower Transfer Goal   Method Shower Stall  (GOAL: DRY XFER at S)   Assist Device Seat with Back;Tub Bench;Grab Bar;Hand Held Shower   Status Target goal - two weeks   Interventions ADL Training;Assistive Device;Neuromuscular Education   Bathing Goal   Shower/bathe self Goal 05. Setup or clean-up assistance - [place] SETS UP or CLEANS UP, patient completes activity. [place] assists only prior to or following the activity.   Environment Seated;Standing;Sponge Bath   Status Target goal - two weeks   Intervention ADL Training;Assistive Device;Neuromuscular Education;Therapeutic Exercise   Upper Body Dressing Goal   Upper body dressing Goal 06. Independent - Patient completes the activity by him/herself with no assistance from a helper.   Task Upper Body;Arms in/out;Over Head   Environment Seated   Status Target goal - two weeks   Intervention Assistive Device;Balance Work;Neuromuscular  Education;Therapeutic Exercise;Tolerance Work   Lower Body Dressing Goal   Lower body dressing Goal 05. Setup or clean-up assistance - [place] SETS UP or CLEANS UP, patient completes activity. Belt assists only prior to or following the activity.   Putting on/taking off footwear Goal 05. Setup or clean-up assistance - Belt SETS UP or CLEANS UP, patient completes activity. Belt assists only prior to or following the activity.   Task Lower Body;Shoe/Slipper;Pants;Socks;Undergarment;Fasteners   Adaptive Equipment Elastic Laces;Dressing Stick;Shoe Horn;Sock Aide;Reacher   Environment Seated;Standing   Status Target goal - two weeks   Intervention Assistive Device;Balance Work;Neuromuscular Education;Therapeutic Exercise;Tolerance Work   Toileting Transfer Goal   Toilet transfer Goal 05. Setup or clean-up assistance - [place] SETS UP or CLEANS UP, patient completes activity. Belt assists only prior to or following the activity.   Assistive Device Grab Bar;Roller Walker;Wheelchair;Bedside Commode;Drop Arm Commode;Raised Toilet Seat   Status Target goal - two weeks   Intervention ADL Training;Balance Work;Assistive Device   Toileting Goal   Toileting hygiene Goal 06. Independent - Patient completes the activity by him/herself with no assistance from a helper.   Task Pants Up;Pants Down;Hygiene   Safety Balance;Use a Bedside Commode at Night;Use a Bedside Commode during day   Status Target goal - two weeks   Intervention ADL Training;Balance Work;Assistive Device   Meal Prep and Kitchen Mobility   Assist Level Independent  (light meal prep at w/c level)   Status Target goal - two weeks   Medication Management   Assist Level Independent   Status Target goal - two weeks     Shaneka Mendiola MS, OTR/L

## 2024-02-10 NOTE — PLAN OF CARE
Problem: SAFETY ADULT  Goal: Patient will remain free of falls  Description: INTERVENTIONS:  - Educate patient/family on patient safety including physical limitations  - Instruct patient to call for assistance with activity   - Consult OT/PT to assist with strengthening/mobility   - Keep Call bell within reach  - Keep bed low and locked with side rails adjusted as appropriate  - Keep care items and personal belongings within reach  - Initiate and maintain comfort rounds  - Make Fall Risk Sign visible to staff  - Offer Toileting every 2 Hours, in advance of need  - Initiate/Maintain alarm  - Obtain necessary fall risk management equipment:   - Apply yellow socks and bracelet for high fall risk patients  - Consider moving patient to room near nurses station  Outcome: Progressing

## 2024-02-10 NOTE — ASSESSMENT & PLAN NOTE
Lab Results   Component Value Date    HGBA1C 6.5 (H) 11/07/2023       Recent Labs     02/09/24  1532 02/09/24  2116 02/10/24  0617 02/10/24  1036   POCGLU 102 210* 153* 166*         Blood Sugar Average: Last 72 hrs:  (P) 157.75  Home: Januvia 100mg daily, metformin 1000mg daily  Here: Humalog 5U 3x daily with meals. Continue accuchecks and SSI.  Will DC Humalog and add metformin 500mg 2x daily.

## 2024-02-10 NOTE — ASSESSMENT & PLAN NOTE
Transferred to [facility] for vascular eval/ongoing podiatry eval   S/p right Ax to fem bypass on 1/31  S/p right foot wound and Achilles debridement with second toe partial amputation and VAC placement   Discussed with vascular surgery and no further vascular surgery intervention planned  Reviewed plastic surgery evaluation unfortunately patient is not a candidate for free muscle flap at this time and will benefit from STSG for skin graft substitute  Monitoring off antibiotics per ID evaluation at [facility]  Discussed with podiatry and patient will not need further OR interventions during this admission. Since she is staying at [facility] they continue wound VAC and readmit down the line if needed for split-thickness skin graft down the line.  Discharged to [facility] today  NWB on right foot. Fall precautions

## 2024-02-10 NOTE — PROGRESS NOTES
[facility]  Progress Note  Name: Lona Cedeno I  MRN: [id number]  Unit/Bed#: -01 I Date of Admission: 2/9/2024   Date of Service: 2/10/2024  Hospital Day: 1    Assessment/Plan   * Diabetic foot ulcer with osteomyelitis (HCC)  Assessment & Plan  Lab Results   Component Value Date    HGBA1C 6.5 (H) 11/07/2023       Recent Labs     02/09/24  1532 02/09/24  2116 02/10/24  0617 02/10/24  1036   POCGLU 102 210* 153* 166*         Blood Sugar Average: Last 72 hrs:  (P) 157.75  Podiatry to follow  Continue VAC dressing.  Follow-up outpt for potential split thickness skin graft.  Observe off abx.      Anemia  Assessment & Plan  Hgb stable post-op.  Continue to monitor.    Tobacco use disorder  Assessment & Plan  Continue Nicotine patch.  Cessation encouraged.    COPD, severity to be determined ([facility])  Assessment & Plan  Continue inhalers.  Pt uses Advair 250/50 and albuterol at home.  Monitor respiratory status.    PAD (peripheral artery disease) ([facility])  Assessment & Plan  Continue ASA, Xarelto and atorvastatin.  Encourage tobacco cessation.    Anxiety and depression  Assessment & Plan  Continue home medications.    Essential hypertension  Assessment & Plan  Home: amiloride 5mg daily, lisinopril 5mg daily  Here: amiloride 5mg daily  Monitor BP and adjust medications as necessary.  BP stable.    Type 2 diabetes mellitus with diabetic polyneuropathy (HCC)  Assessment & Plan  Lab Results   Component Value Date    HGBA1C 6.5 (H) 11/07/2023       Recent Labs     02/09/24  1532 02/09/24  2116 02/10/24  0617 02/10/24  1036   POCGLU 102 210* 153* 166*         Blood Sugar Average: Last 72 hrs:  (P) 157.75  Home: Januvia 100mg daily, metformin 1000mg daily  Here: Humalog 5U 3x daily with meals. Continue accuchecks and SSI.  Will DC Humalog and add metformin 500mg 2x daily.             The above assessment and plan was reviewed and updated as determined by my evaluation of the patient on  2/10/2024.    Labs:   Results from last 7 days   Lab Units 02/09/24  0540 02/07/24  0935   WBC Thousand/uL 9.90 10.39*   HEMOGLOBIN g/dL 10.3* 10.2*   HEMATOCRIT % 32.3* 32.0*   PLATELETS Thousands/uL 367 344     Results from last 7 days   Lab Units 02/09/24  0540 02/07/24  0935   SODIUM mmol/L 138 137   POTASSIUM mmol/L 4.4 4.4   CHLORIDE mmol/L 100 101   CO2 mmol/L 30 31   BUN mg/dL 14 16   CREATININE mg/dL 0.59* 0.62   CALCIUM mg/dL 10.0 9.4         Results from last 7 days   Lab Units 02/04/24  0440 02/03/24  1850   INR  1.03 1.11     Results from last 7 days   Lab Units 02/10/24  1036 02/10/24  0617 02/09/24  2116   POC GLUCOSE mg/dl 166* 153* 210*       Imaging  No orders to display       Review of Scheduled Meds:  Current Facility-Administered Medications   Medication Dose Route Frequency Provider Last Rate    acetaminophen  975 mg Oral Q8H JILLIAN Denisse Berger PA-C      albuterol  2 puff Inhalation Q6H PRN Denisse Berger PA-C      AMILoride  5 mg Oral Daily Denisse Berger PA-C      aspirin  81 mg Oral Daily Denisse Berger PA-C      atorvastatin  10 mg Oral Daily With Dinner Denisse Berger PA-C      bisacodyl  10 mg Rectal Daily Denisse Berger PA-C      buPROPion  300 mg Oral Daily Denisse Berger PA-C      clonazePAM  1 mg Oral QPM Denisse Berger PA-C      docusate sodium  100 mg Oral BID Denisse Berger PA-C      fish oil  1,000 mg Oral Daily Denisse Berger PA-C      fluticasone-vilanterol  1 puff Inhalation Daily Denisse Berger PA-C      insulin lispro  1-5 Units Subcutaneous TID AC Denisse Berger PA-C      insulin lispro  1-5 Units Subcutaneous HS Denisse Berger PA-C      metFORMIN  500 mg Oral BID With Meals Denisse [facility], PA-C      methocarbamol  250 mg Oral Q8H JILLIAN Denisse Berger PA-C      nicotine  1 patch Transdermal Daily Denisse Berger PA-C      ondansetron  4 mg Oral Q6H PRN Denisse Berger PA-C       "oxybutynin  5 mg Oral Daily Denisse Berger PA-C      oxyCODONE  2.5 mg Oral Q4H PRN Denisse Berger PA-C      Or    oxyCODONE  5 mg Oral Q4H PRN Denisse Berger PA-C      pantoprazole  40 mg Oral Early Morning Denisse Berger PA-C      polyethylene glycol  17 g Oral Daily Denisse Berger PA-C      pregabalin  200 mg Oral TID Denisse Berger PA-C      rivaroxaban  2.5 mg Oral BID With Meals Denisse Berger PA-C      senna  2 tablet Oral HS Denisse Berger PA-C         Subjective/ HPI: Patient seen and examined. Patients overnight issues or events were reviewed with nursing staff. New or overnight issues include the following:     Pt seen in her room. She states that she is fatigued from therapy. She denies any other complaints.    ROS:   A 10 point ROS was performed; negative except as noted above.        *Labs /Radiology studies Reviewed  *Medications  reviewed and reconciled as needed  *Please refer to order section for additional ordered labs studies      Physical Examination:  Vitals:   Vitals:    02/09/24 1430 02/09/24 2046 02/10/24 0555   BP: 124/66 107/53 113/59   BP Location: Left arm Left arm Left arm   Pulse: 72 86 80   Resp: 16 16 18   Temp: 97.7 °F (36.5 °C) 97.9 °F (36.6 °C) 98.1 °F (36.7 °C)   TempSrc: Oral Oral Oral   SpO2: 96% 91% 95%   Weight: 69.5 kg (153 lb 3.5 oz)     Height: 5' 1\" (1.549 m)         General Appearance: NAD; pleasant  HEENT: PERRLA, conjuctiva normal; mucous membranes moist; face symmetrical  Neck:  Supple  Lungs: clear bilaterally, normal respiratory effort, no retractions, expiratory effort normal, on room air  CV: regular rate and rhythm, no murmurs rubs or gallops noted   ABD: soft non tender, +BS x4  EXT: no lymphadenopathy, no edema. Rt LE VAC with serosanguinous drainage. Rt chest hematoma dressed.  Skin: normal turgor, normal texture, no rash  Psych: affect normal, mood normal  Neuro: AAOx3       The above physical exam was reviewed " and updated as determined by my evaluation of the patient on 2/10/2024.    Invasive Devices       Drain  Duration             Ureteral Internal Stent Left ureter 6 Fr. 82 days                       VTE Pharmacologic Prophylaxis: Xarelto  Code Status: Level 1 - Full Code  Current Length of Stay: 1 day(s)    Total floor / unit time spent today 30 minutes  Coordination of patient's care was performed in conjunction with primary service. Time invested included review of patient's labs, vitals, and management of their comorbidities with continued monitoring, examination of patient as well as answering patient questions, documenting her findings and creating progress note in electronic medical record,  ordering appropriate diagnostic testing.       ** Please Note:  voice to text software may have been used in the creation of this document. Although proof errors in transcription or interpretation are a potential of such software**

## 2024-02-10 NOTE — ASSESSMENT & PLAN NOTE
D/w vascular surgery. Started on Xarelto 2.5 mg BID  Continue Aspirin and statin  S/p bypass  Encouraged tobacco cessation

## 2024-02-10 NOTE — PLAN OF CARE
Problem: Prexisting or High Potential for Compromised Skin Integrity  Goal: Skin integrity is maintained or improved  Description: INTERVENTIONS:  - Identify patients at risk for skin breakdown  - Assess and monitor skin integrity  - Assess and monitor nutrition and hydration status  - Monitor labs   - Assess for incontinence   - Turn and reposition patient  - Assist with mobility/ambulation  - Relieve pressure over bony prominences  - Avoid friction and shearing  - Provide appropriate hygiene as needed including keeping skin clean and dry  - Evaluate need for skin moisturizer/barrier cream  - Collaborate with interdisciplinary team   - Patient/family teaching  - Consider wound care consult   Outcome: Progressing     Problem: PAIN - ADULT  Goal: Verbalizes/displays adequate comfort level or baseline comfort level  Description: Interventions:  - Encourage patient to monitor pain and request assistance  - Assess pain using appropriate pain scale  - Administer analgesics based on type and severity of pain and evaluate response  - Implement non-pharmacological measures as appropriate and evaluate response  - Consider cultural and social influences on pain and pain management  - Notify physician/advanced practitioner if interventions unsuccessful or patient reports new pain  Outcome: Progressing     Problem: INFECTION - ADULT  Goal: Absence or prevention of progression during hospitalization  Description: INTERVENTIONS:  - Assess and monitor for signs and symptoms of infection  - Monitor lab/diagnostic results  - Monitor all insertion sites, i.e. indwelling lines, tubes, and drains  - Monitor endotracheal if appropriate and nasal secretions for changes in amount and color  - Letcher appropriate cooling/warming therapies per order  - Administer medications as ordered  - Instruct and encourage patient and family to use good hand hygiene technique  - Identify and instruct in appropriate isolation precautions for  identified infection/condition  Outcome: Progressing     Problem: SAFETY ADULT  Goal: Patient will remain free of falls  Description: INTERVENTIONS:  - Educate patient/family on patient safety including physical limitations  - Instruct patient to call for assistance with activity   - Consult OT/PT to assist with strengthening/mobility   - Keep Call bell within reach  - Keep bed low and locked with side rails adjusted as appropriate  - Keep care items and personal belongings within reach  - Initiate and maintain comfort rounds  - Make Fall Risk Sign visible to staff  - Offer Toileting every  Hours, in advance of need  - Initiate/Maintain alarm  - Obtain necessary fall risk management equipment:   - Apply yellow socks and bracelet for high fall risk patients  - Consider moving patient to room near nurses station  Outcome: Progressing  Goal: Maintain or return to baseline ADL function  Description: INTERVENTIONS:  -  Assess patient's ability to carry out ADLs; assess patient's baseline for ADL function and identify physical deficits which impact ability to perform ADLs (bathing, care of mouth/teeth, toileting, grooming, dressing, etc.)  - Assess/evaluate cause of self-care deficits   - Assess range of motion  - Assess patient's mobility; develop plan if impaired  - Assess patient's need for assistive devices and provide as appropriate  - Encourage maximum independence but intervene and supervise when necessary  - Involve family in performance of ADLs  - Assess for home care needs following discharge   - Consider OT consult to assist with ADL evaluation and planning for discharge  - Provide patient education as appropriate  Outcome: Progressing  Goal: Maintains/Returns to pre admission functional level  Description: INTERVENTIONS:  - Perform AM-PAC 6 Click Basic Mobility/ Daily Activity assessment daily.  - Set and communicate daily mobility goal to care team and patient/family/caregiver.   - Collaborate with  rehabilitation services on mobility goals if consulted  - Perform Range of Motion  times a day.  - Reposition patient every  hours.  - Dangle patient  times a day  - Stand patient  times a day  - Ambulate patient  times a day  - Out of bed to chair  times a day   - Out of bed for meals  times a day  - Out of bed for toileting  - Record patient progress and toleration of activity level   Outcome: Progressing     Problem: DISCHARGE PLANNING  Goal: Discharge to home or other facility with appropriate resources  Description: INTERVENTIONS:  - Identify barriers to discharge w/patient and caregiver  - Arrange for needed discharge resources and transportation as appropriate  - Identify discharge learning needs (meds, wound care, etc.)  - Arrange for interpretive services to assist at discharge as needed  - Refer to Case Management Department for coordinating discharge planning if the patient needs post-hospital services based on physician/advanced practitioner order or complex needs related to functional status, cognitive ability, or social support system  Outcome: Progressing

## 2024-02-10 NOTE — PROGRESS NOTES
Occupational Therapy Initial Evaluation     02/10/24 0421   Patient Data   Rehab Impairment other disabling impairments   Etiologic Diagnosis diabetic right foot ulcer with osteomyelisis s/p fem bypass, right foot wound and achilles debridement with 2nd toe partial amputation and VAC placement   Date of Onset 02/26/24   Support System   Name Pt lives w/ , Carmelo, and daughter, Balbina. Pt reports her  ambulates w/ RW and has Parkinson's disease. Pt add'lly reported her daughter has an intelectual disability and pt is the only  in the home. Pt has a second local daughter, Rozina, who can provide partial A at d/c around work schedule. Third daughter, Kathy, resides in [place] and w/ limited ability to provide A.   Able to provide 24 hour supervision Yes   Able to provide physical help? No   Home Setup   Type of Home Single Level   Method of Entry Stairs;Hand Rail Bilateral   Number of Stairs 10  (5 concrete steps w/ BHR + 5 wooden steps w/ BHR. vs 5 concrete steps w/ BHR +2 PATO w/ BHR.)   Number of Stairs in Home 0   First Floor Bathroom Full;Shower;Grab Bars  (HHSH. 2 full bathrooms: walk-in shower w/ grab bars and HHSH and tub/shower without grab bars. RTS in both bathrooms.)   First Floor Bathroom Accessibility Grab bars in tub/shower;Raised toilet seat   First Floor Setup Available Yes   Home Modification Comment TBD   Available Equipment Roller Walker;Rollator;Single Point Cane;Shower Chair;Handheld Shower  (grab bars in shower)   Baseline Information   Prior Device(s) Used Single Point Cane;Rollator  (SPC for stairs, rollator for community mobility)   Prior IADL Participation   Money Management Identify Money;Estimate Costs;Estimate Change;Combine Bills   Meal Preparation Full Participation   Laundry Full Participation   Home Cleaning Full Participation   Prior Level of Function   Self-Care 3. Independent - Patient completed the activities by him/herself, with or without an assistive device,  with no assistance from a helper.   Indoor-Mobility (Ambulation) 3. Independent - Patient completed the activities by him/herself, with or without an assistive device, with no assistance from a helper.   Stairs 3. Independent - Patient completed the activities by him/herself, with or without an assistive device, with no assistance from a helper.   Functional Cognition 3. Independent - Patient completed the activities by him/herself, with or without an assistive device, with no assistance from a helper.   Prior Device Used Z. None of the above  (SPC)   Falls in the Last Year   Number of falls in the past 12 months 0   Patient Preference   Nickname (Patient Preference) Lona Hastings   Psychosocial   Psychosocial (WDL) WDL   Patient Behaviors/Mood Appropriate for situation;Brightens with approach;Calm;Cooperative;Pleasant   Restrictions/Precautions   Precautions Bed/chair alarms;Fall Risk;Hard of hearing;Pain;Supervision on toilet/commode;Cognitive   Weight Bearing Restrictions Yes   RLE Weight Bearing Per Order (S)  NWB   ROM Restrictions No   Braces or Orthoses Other (Comment)  (R prevalon boot, R wound vac)   Pain Assessment   Pain Assessment Tool 0-10   Pain Score 4   Pain Location/Orientation Orientation: Right;Location: Leg;Location: Foot   Eating Assessment   Type of Assistance Needed Independent   Physical Assistance Level No physical assistance   Eating CARE Score 6   Oral Hygiene   Type of Assistance Needed Physical assistance   Physical Assistance Level 76% or more   Comment would anticipate Max A to complete in stance while adhering to RLE NWB to simulate baseline. Completed at S w/ set-up while seated in w/c at sink.   Oral Hygiene CARE Score 2   Tub/Shower Transfer   Reason Not Assessed Medical;Sponge Bath  (RUE wound vac)   Shower/Bathe Self   Type of Assistance Needed Physical assistance;Verbal cues;Set-up / clean-up   Physical Assistance Level Total assistance   Comment would anticipate Ax2 to complete in  stance without AD/AE to simulate baseline. While seated EOB, bathed UB, az, upper legs, and L lower leg. Attempted partial push-up to bathe buttocks, however pt attempted to weightbear through RLE. Req cues for extended alternating lateral weightshifting to bathe buttocks.   Shower/Bathe Self CARE Score 1   Dressing/Undressing Clothing   Type of Assistance Needed Supervision;Set-up / clean-up   Physical Assistance Level No physical assistance   Comment seated EOB   Upper Body Dressing CARE Score 4   Type of Assistance Needed Physical assistance;Verbal cues;Set-up / clean-up   Physical Assistance Level Total assistance   Comment would req Ax2 without AD/AE. while seated, req A to fully thread wound vac line and RUE. Pt able to thread LLE w/ inc time. Pt requested to attempt to stand, however attempted to weightbear through RLE despite ongoing edu and cues. Therefore completed while seated EOB w/ alternating lateral weightshifting for clothing management.   Lower Body Dressing CARE Score 1   Putting On/Taking Off Footwear   Type of Assistance Needed Supervision;Set-up / clean-up   Physical Assistance Level No physical assistance   Comment able to don/doff L sock. Requested pt ask family for supportive shoe, pt reports not having any.   Putting On/Taking Off Footwear CARE Score 4   Toileting Hygiene   Type of Assistance Needed Physical assistance;Verbal cues;Adaptive equipment;Set-up / clean-up   Physical Assistance Level Total assistance   Comment would anticipate Ax2 without AD/AE to simulate baseline. While seated, able to manage hygiene w/ set-up. Pt req cues for partial push-up from BSC for clothing management. Recommend hygiene/clothing to be completed at seated level w/ staff.   Toileting Hygiene CARE Score 1   Toilet Transfer   Type of Assistance Needed Physical assistance;Verbal cues;Set-up / clean-up;Adaptive equipment   Physical Assistance Level Total assistance   Comment would anticipate Ax2 without AD/AE  to simulate baseline. Mod A sit pivot EOB<>BSC. Mod cues for sequencing and proper hand placement. Recommend sit pivots w/ staff.   Toilet Transfer CARE Score 1   Transfer Bed/Chair/Wheelchair   Type of Assistance Needed Physical assistance;Verbal cues;Set-up / clean-up;Adaptive equipment   Physical Assistance Level Total assistance   Comment Would anticipate Ax2 without AD to simulate baseline. Pt attempts to weightbear through RLE w/ partial stands. Mod A sit pivot EOB<>w/c, w/c>recliner. Kept prevalon boot on for all xfers as pt attempts to weightbear through RLE at times.   Chair/Bed-to-Chair Transfer CARE Score 1   Lying to Sitting on Side of Bed   Type of Assistance Needed Set-up / clean-up;Physical assistance   Physical Assistance Level 25% or less   Lying to Sitting on Side of Bed CARE Score 3   Sit to Stand   Comment partial stance only as pt attempts to weightbear through RLE.   Reason if not Attempted Safety concerns   Sit to Stand CARE Score 88   Comprehension   QI: Comprehension 4. Undestands: Clear comprehension without cues or repetitions   Expression   QI: Expression 4. Express complex messages without difficulty and with speech that is clear and easy to understan   RLE Assessment   RLE Assessment X  (RUE NWB w/ wound vac)   RUE Assessment   RUE Assessment WFL  (R-hand dominant. Pt reports h/o proximal fracture without repair several years ago, pt unable to recall fracture however reported having no surgery and was treated conservatively. Pt reports pain w/ repetitive motion only.)   LUE Assessment   LUE Assessment WFL   Sensation   Light Touch Partial deficits in the RLE;Partial deficits in the LLE   Additional Comments impaired sensation to b/l feet.   Cognition   Overall Cognitive Status Impaired   Arousal/Participation Alert;Responsive   Attention Attends with cues to redirect   Orientation Level Oriented X4;Oriented to person;Oriented to place;Oriented to time;Oriented to situation   Memory  "Decreased recall of precautions   Following Commands Follows one step commands with increased time or repetition   Comments impaired safety awareness, impaired insight into deficits, poor divided attention to task, and dec fxnl problem-solving at times. Add'lly w/ poor health literacy, req edu related to DM and recovery timeline. Will cont to assess functional cognition during OT course. May benefit from formalized cognitive assessment to A w/ d/c planning.   Vision   Vision Comments Pt wears glasses for reading and driving.   Objective Measure   OT Measure(s) Edu on 3-hour rule, daily schedule, role of OT, and therapy expectations. Provided w/ soft-care cushion for recliner to A w/ sacral offloading. Extended edu on RLE NWB status, utilized prevalon boot for all xfers.   Discharge Information   Vocational Plan Retired/not working   Patient's Discharge Plan home w/ family support   Patient's Rehab Expectations \"To get better and be able to take care of myself.\"   Barriers to Discharge Home Limited Family Support;Unsafe Home Setup;Decreased Cognitive Function;Decreased Strength;Decreased Endurance;Pain;Safety Considerations;Other  (RLE NWB status)   Impressions Pt is a 77 y/o female presenting to [facility] on 1/23/24 sustaining diabetic ulcer of the toe and R ankle wound w/ exposure of the Achilles tendon from podiatry OP office. Work-up revealed significant aortoiliac disease and inadequate healing potential. Pt underwent R axillo-femoral bypass on 1/31 and req debridement foot/toe, partial amputation of distal 2nd toe, and vac placement; now NWB to RLE w/ wound vac. Course complicated by R chest hematoma req evacuation/drainage. PMH includes COPD, DM-2, HTN, tobacco abuse, mood disorder, PAD, and bladder neoplasm. Pt reports completing ADLs/IADLs and fxnl mobility independent without AD for household distances, SPC for stairs, and rollator for community mobility, (+) driving. Pt is currently functioning at overall Mod " A for ADLs and Mod A for fxnl sit pivot xfers. Pt is limited by dec strength, dec endurance, impaired balance, RLE NWB status, RLE pain, fatigue, impaired safety awareness, impaired insight into deficits, unsafe home set-up w/ PATO, limited social support, and ADL dysfunction. Pt will benefit from skilled OT services w/ focus on above barriers, assess need for DME, provide pt/family edu, and maximize fxnl indep to return to PLOF in 2 week ELOS to meet overall set-up to independent ADL goals.   OT Therapy Minutes   OT Time In 0830   OT Time Out 1000   OT Total Time (minutes) 90   OT Mode of treatment - Individual (minutes) 90   OT Mode of treatment - Concurrent (minutes) 0   OT Mode of treatment - Group (minutes) 0   OT Mode of treatment - Co-treat (minutes) 0   OT Mode of Treatment - Total time(minutes) 90 minutes   OT Cumulative Minutes 90   Cumulative Minutes   Cumulative therapy minutes 90     Shaneka Mendiola MS, OTR/L

## 2024-02-10 NOTE — ASSESSMENT & PLAN NOTE
Over right chest wall. Possible seroma  S/p evacuation by vascular surgery on 2/4  Discussed with vascular surgery okay to restart DVT prophylaxis  Stable

## 2024-02-10 NOTE — PROGRESS NOTES
"   02/10/24 1230   Patient Data   Rehab Impairment Impairment of mobility, safety and Activities of Daily Living (ADLs) due to Other Disabling Impairments:  13  Other Disabling Impairments   Etiologic Diagnosis Diabetic right foot ulcer with osteomyelitis s/p fem bypass, right foot wound and achilles debridement with 2nd toe partial amputation and VAC placement   Date of Onset 02/26/24  (Date of surgery: 1/31, 2/4)   Support System   Name Patient lives with her  and dtr, both of which she is primary caretaker for. Patient (I) in PLOF, (+) . She has two other dtrs and reports one dtr Rozina can provide intermittent assist at DC.   Able to provide 24 hour supervision Yes   Able to provide physical help? No   Home Setup   Type of Home Single Level   Method of Entry Hand Rail Bilateral;Stairs   Number of Stairs 10  (5 concrete steps w/ BHR + 5 wooden steps w/ BHR. vs 5 concrete steps w/ BHR +2 PATO w/ BHR.)   Number of Stairs in Home   (stairs to basement laundry but daughter does it)   First Floor Bathroom Full;Shower;Grab Bars   First Floor Bathroom Accessibility Grab bars in tub/shower;Raised toilet seat   First Floor Setup Available Yes   Home Modification Comment (S)  discussed ramp with patient and provided her with list   Available Equipment Rollator;Single Point Cane;Shower Chair;Handheld Shower  (uses SPC on stairs only and Rollator when shopping)   Baseline Information   Transportation    Prior Device(s) Used Single Point Cane;Rollator  (SPC on stairs, Rollator \"when shopping\")   Prior Level of Function   Self-Care 3. Independent - Patient completed the activities by him/herself, with or without an assistive device, with no assistance from a helper.   Indoor-Mobility (Ambulation) 3. Independent - Patient completed the activities by him/herself, with or without an assistive device, with no assistance from a helper.   Stairs 3. Independent - Patient completed the activities by " him/herself, with or without an assistive device, with no assistance from a helper.   Functional Cognition 3. Independent - Patient completed the activities by him/herself, with or without an assistive device, with no assistance from a helper.   Prior Device Used Z. None of the above   Falls in the Last Year   Number of falls in the past 12 months 1  (pt reports 1 all in July where she broke her pelvis)   Type of Injury Associated with Fall Injury   Psychosocial   Psychosocial (WDL) WDL   Patient Behaviors/Mood Calm;Cooperative   Restrictions/Precautions   Precautions Fall Risk;Bed/chair alarms;Hard of hearing;Pain;Supervision on toilet/commode;Cognitive   Weight Bearing Restrictions Yes   RLE Weight Bearing Per Order (S)  NWB   ROM Restrictions No   Braces or Orthoses   (right Prevealon boot, wound vac)   Pain Assessment   Pain Assessment Tool 0-10   Pain Score 4   Pain Location/Orientation Orientation: Right;Location: Shoulder   Pain Onset/Description Frequency: Intermittent   Effect of Pain on Daily Activities chronic injury (RUE fx ~3 years ago)able to tolerate mobility   Patient's Stated Pain Goal No pain   Hospital Pain Intervention(s) Rest   Toilet Transfer   Type of Assistance Needed Physical assistance;Verbal cues   Physical Assistance Level 76% or more   Comment max A SPT from recliner < > BSC without AD, able to maintain RLE NWB   Toilet Transfer CARE Score 2   Transfer Bed/Chair/Wheelchair   Type of Assistance Needed Physical assistance;Verbal cues   Physical Assistance Level 76% or more   Comment max A without AD (PLOF); with RW, able to perform SPT with min/mod A multiple times t/o session, NWB RLE   Chair/Bed-to-Chair Transfer CARE Score 2   Roll Left and Right   Type of Assistance Needed Supervision   Comment on flat mat table   Roll Left and Right CARE Score 4   Sit to Lying   Type of Assistance Needed Physical assistance;Verbal cues   Physical Assistance Level 25% or less   Comment on flat mat  table, min A for RLE management   Sit to Lying CARE Score 3   Lying to Sitting on Side of Bed   Type of Assistance Needed Physical assistance;Verbal cues   Physical Assistance Level 25% or less   Comment on flat mat table, min A   Lying to Sitting on Side of Bed CARE Score 3   Sit to Stand   Type of Assistance Needed Physical assistance;Verbal cues   Physical Assistance Level 51%-75%   Comment mod/max  A without AD, min A with  RW. NWB RLE   Sit to Stand CARE Score 2   Picking Up Object   Reason if not Attempted Safety concerns   Picking Up Object CARE Score 88   Car Transfer   Type of Assistance Needed Physical assistance;Verbal cues   Physical Assistance Level Total assistance   Comment would anticipate total A or A x 2 to performing without AD (PLOF), trialed with RW and pt performed with mod A in/out f simulated car   Car Transfer CARE Score 1   Ambulation   Primary Mode of Locomotion Prior to Admission Walk   Distance Walked (feet) 10 ft   Assist Device Roller Walker   Gait Pattern Hopping   Limitations Noted In Balance;Device Management;Endurance;Strength;Safety   Provided Assistance with: Balance   Walk Assist Level Chair Follow;Minimum Assist   Findings pt able to maintain NWB RLE with short distance hopping with RW   Walk 10 Feet   Comment unable to perform at PLOF due to safety concerns; provided RW and pt able to hop up to 10 ft with close WC follow, min A , and wound vac management   Reason if not Attempted Safety concerns   Walk 10 Feet CARE Score 88   Walk 50 Feet with Two Turns   Reason if not Attempted Safety concerns   Walk 50 Feet with Two Turns CARE Score 88   Walk 150 Feet   Reason if not Attempted Safety concerns   Walk 150 Feet CARE Score 88   Walking 10 Feet on Uneven Surfaces   Reason if not Attempted Safety concerns   Walking 10 Feet on Uneven Surfaces CARE Score 88   Wheelchair mobility   Type of Wheelchair Used 1. Manual   Method Right upper extremity;Left upper extremity;Left lower  extremity   Assistance Provided For Locking Brakes;Remove Leg Rest;Replace Leg Rest   Distance Level Surface (feet) 50 ft  (x 2)   Findings lissette cues needed on use, rest breaks needed due to fatigue, chronic R shoulder pain   Wheel 50 Feet with Two Turns   Type of Assistance Needed Verbal cues;Physical assistance   Physical Assistance Level 25% or less   Wheel 50 Feet with Two Turns CARE Score 3   Wheel 150 Feet   Reason if not Attempted Safety concerns   Wheel 150 Feet CARE Score 88   Curb or Single Stair   Reason if not Attempted Safety concerns   1 Step (Curb) CARE Score 88   4 Steps   Reason if not Attempted Safety concerns   4 Steps CARE Score 88   12 Steps   Reason if not Attempted Safety concerns   12 Steps CARE Score 88   Comprehension   QI: Comprehension 4. Undestands: Clear comprehension without cues or repetitions   Expression   QI: Expression 4. Express complex messages without difficulty and with speech that is clear and easy to understan   RLE Assessment   RLE Assessment X   Strength RLE   R Hip Flexion 3+/5   R Hip ABduction 4-/5   R Hip ADduction 4-/5   R Knee Flexion 4-/5   R Knee Extension 4-/5   R Ankle Dorsiflexion   (did not assess due to NWB RLE, wound vac)   R Ankle Plantar Flexion   (did not assess due to NWB RLE, wound vac)   LLE Assessment   LLE Assessment WFL   RUE Assessment   RUE Assessment WFL   LUE Assessment   LUE Assessment WFL   Sensation   Light Touch Partial deficits in the RLE   Cognition   Memory Decreased recall of precautions   Following Commands Follows one step commands with increased time or repetition   Comments impaired safety awareness, decreased attention to wound vac tubing despite cues. more compliant with RLE NWB in PM PT eval compared to mornign OT eval   Objective Measure   PT Measure(s) (S)  5x STS from WC with use of BUEs (NWB RLE, did not bring UEs to RW): 23.3 seconds   PT Findings patient education provided on role of PT, PT goals, precautions, safety, POC,  "DC planning including ramp information   Therapeutic Exercise   Therapeutic Exercise/Activity supine: BLE SLRs, LLE bridging,. seated: hip add ball squeeze, knee ext and hip flexion AROM RLE 2# weight LLE. all 3 sets of 10 with rest breaks provided PRN   Discharge Information   Vocational Plan Retired/not working   Patient's Discharge Plan home with familty support   Patient's Rehab Expectations \"To be independent when I go home\"   Barriers to Discharge Home Limited Family Support;Unsafe Home Setup;Decreased Cognitive Function;Decreased Strength;Decreased Endurance;Pain;Safety Considerations   Impressions Pt is 78 y.o. female seen for PT evaluation s/p admit to [facility] on 2/9/2024 w/ Diabetic foot ulcer with osteomyelitis (HCC).  Comorbidities affecting pt's physical performance at time of assessment include: closed coccyx fx, DM, R shoulder fx, anxiety. PTA, pt was independent w/ all functional mobility w/ cane on stairs and Rollator for long distance in communtiy, ambulates community distances and elevations, and has 10 PATO. Patient is primary caretaker for  and daughter. Personal factors affecting pt at time of IE include: inaccessible home environment, ambulating w/ assistive device, stairs to enter home, inability to ambulate household distances, inability to navigate community distances, positive fall history, and compliance. Please find objective findings from PT assessment regarding body systems outlined above with impairments and limitations including weakness, decreased ROM, impaired balance, decreased endurance, gait deviations, pain, decreased activity tolerance, decreased functional mobility tolerance, decreased safety awareness, fall risk, orthopedic restrictions, and decreased skin integrity. The following objective measures performed on IE also reveal limitations: 5x STS below normative range. Pt to benefit from continued PT tx to address deficits as defined above and maximize " level of functional independent mobility and consistency. From PT/mobility standpoint, recommendation at time of d/c would be home with home health rehabilitation pending progress in order to facilitate return to PLOF. Patient may require ramp installation, provided her with handout.   PT Therapy Minutes   PT Time In 1230   PT Time Out 1400   PT Total Time (minutes) 90   PT Mode of treatment - Individual (minutes) 90   PT Mode of treatment - Concurrent (minutes) 0   PT Mode of treatment - Group (minutes) 0   PT Mode of treatment - Co-treat (minutes) 0   PT Mode of Treatment - Total time(minutes) 90 minutes   PT Cumulative Minutes 90   Cumulative Minutes   Cumulative therapy minutes 180

## 2024-02-10 NOTE — ASSESSMENT & PLAN NOTE
Lab Results   Component Value Date    HGBA1C 6.5 (H) 11/07/2023       Recent Labs     02/09/24  1532 02/09/24  2116 02/10/24  0617 02/10/24  1036   POCGLU 102 210* 153* 166*         Blood Sugar Average: Last 72 hrs:  (P) 141.3  Outpatient regimen metformin 1000 mg twice daily and Januvia  Continue Lyrica  Accu-Cheks reviewed and glucose stable while inpatient

## 2024-02-11 LAB
GLUCOSE SERPL-MCNC: 122 MG/DL (ref 65–140)
GLUCOSE SERPL-MCNC: 127 MG/DL (ref 65–140)
GLUCOSE SERPL-MCNC: 128 MG/DL (ref 65–140)
GLUCOSE SERPL-MCNC: 184 MG/DL (ref 65–140)

## 2024-02-11 PROCEDURE — 82948 REAGENT STRIP/BLOOD GLUCOSE: CPT

## 2024-02-11 PROCEDURE — 97110 THERAPEUTIC EXERCISES: CPT

## 2024-02-11 PROCEDURE — 99232 SBSQ HOSP IP/OBS MODERATE 35: CPT | Performed by: INTERNAL MEDICINE

## 2024-02-11 PROCEDURE — 97530 THERAPEUTIC ACTIVITIES: CPT

## 2024-02-11 RX ADMIN — ACETAMINOPHEN 975 MG: 325 TABLET, FILM COATED ORAL at 16:06

## 2024-02-11 RX ADMIN — METHOCARBAMOL 250 MG: 500 TABLET ORAL at 21:27

## 2024-02-11 RX ADMIN — METHOCARBAMOL 250 MG: 500 TABLET ORAL at 16:06

## 2024-02-11 RX ADMIN — PREGABALIN 200 MG: 100 CAPSULE ORAL at 16:06

## 2024-02-11 RX ADMIN — BUPROPION HYDROCHLORIDE 300 MG: 150 TABLET, EXTENDED RELEASE ORAL at 08:11

## 2024-02-11 RX ADMIN — DOCUSATE SODIUM 100 MG: 100 CAPSULE, LIQUID FILLED ORAL at 08:10

## 2024-02-11 RX ADMIN — PREGABALIN 200 MG: 100 CAPSULE ORAL at 08:10

## 2024-02-11 RX ADMIN — ATORVASTATIN CALCIUM 10 MG: 10 TABLET, FILM COATED ORAL at 16:06

## 2024-02-11 RX ADMIN — OXYBUTYNIN CHLORIDE 5 MG: 5 TABLET, EXTENDED RELEASE ORAL at 08:10

## 2024-02-11 RX ADMIN — DOCUSATE SODIUM 100 MG: 100 CAPSULE, LIQUID FILLED ORAL at 18:21

## 2024-02-11 RX ADMIN — CLONAZEPAM 1 MG: 1 TABLET ORAL at 18:21

## 2024-02-11 RX ADMIN — PANTOPRAZOLE SODIUM 40 MG: 40 TABLET, DELAYED RELEASE ORAL at 08:10

## 2024-02-11 RX ADMIN — RIVAROXABAN 2.5 MG: 2.5 TABLET, FILM COATED ORAL at 08:11

## 2024-02-11 RX ADMIN — POLYETHYLENE GLYCOL 3350 17 G: 17 POWDER, FOR SOLUTION ORAL at 08:10

## 2024-02-11 RX ADMIN — INSULIN LISPRO 1 UNITS: 100 INJECTION, SOLUTION INTRAVENOUS; SUBCUTANEOUS at 16:05

## 2024-02-11 RX ADMIN — ACETAMINOPHEN 975 MG: 325 TABLET, FILM COATED ORAL at 05:49

## 2024-02-11 RX ADMIN — RIVAROXABAN 2.5 MG: 2.5 TABLET, FILM COATED ORAL at 18:21

## 2024-02-11 RX ADMIN — OMEGA-3 FATTY ACIDS CAP 1000 MG 1000 MG: 1000 CAP at 08:11

## 2024-02-11 RX ADMIN — METHOCARBAMOL 250 MG: 500 TABLET ORAL at 05:49

## 2024-02-11 RX ADMIN — METFORMIN HYDROCHLORIDE 500 MG: 500 TABLET ORAL at 16:06

## 2024-02-11 RX ADMIN — ASPIRIN 81 MG: 81 TABLET, COATED ORAL at 08:10

## 2024-02-11 RX ADMIN — FLUTICASONE FUROATE AND VILANTEROL TRIFENATATE 1 PUFF: 200; 25 POWDER RESPIRATORY (INHALATION) at 08:10

## 2024-02-11 RX ADMIN — NICOTINE 1 PATCH: 21 PATCH, EXTENDED RELEASE TRANSDERMAL at 08:09

## 2024-02-11 RX ADMIN — METFORMIN HYDROCHLORIDE 500 MG: 500 TABLET ORAL at 08:10

## 2024-02-11 RX ADMIN — AMILORIDE HYDROCLORIDE 5 MG: 5 TABLET ORAL at 08:11

## 2024-02-11 RX ADMIN — PREGABALIN 200 MG: 100 CAPSULE ORAL at 21:28

## 2024-02-11 RX ADMIN — ACETAMINOPHEN 975 MG: 325 TABLET, FILM COATED ORAL at 21:28

## 2024-02-11 NOTE — PROGRESS NOTES
[facility]  Progress Note  Name: Lona Cedeno I  MRN: [id number]  Unit/Bed#: -01 I Date of Admission: 2/9/2024   Date of Service: 2/11/2024 I Hospital Day: 2    Assessment/Plan   * Diabetic foot ulcer with osteomyelitis (HCC)  Assessment & Plan  Lab Results   Component Value Date    HGBA1C 6.5 (H) 11/07/2023       Recent Labs     02/10/24  1558 02/10/24  2132 02/11/24  0617 02/11/24  1041   POCGLU 177* 207* 128 122         Blood Sugar Average: Last 72 hrs:  (P) 158.125  Podiatry to follow  Continue VAC dressing.  Follow-up outpt for potential split thickness skin graft.  Observe off abx.      Anemia  Assessment & Plan  Hgb stable post-op.  Continue to monitor.    Tobacco use disorder  Assessment & Plan  Continue Nicotine patch.  Cessation encouraged.    COPD, severity to be determined ([facility])  Assessment & Plan  Continue Breo and albuterol.  Pt uses Advair 250/50 and albuterol at home.  Monitor respiratory status.    PAD (peripheral artery disease) ([facility])  Assessment & Plan  Continue ASA, Xarelto and atorvastatin.  Encourage tobacco cessation.  May need a price check on Xarelto.    Anxiety and depression  Assessment & Plan  Continue home medications.    Essential hypertension  Assessment & Plan  Home: amiloride 5mg daily, lisinopril 5mg daily  Here: amiloride 5mg daily  Monitor BP and adjust medications as necessary.  BP stable.    Type 2 diabetes mellitus with diabetic polyneuropathy (HCC)  Assessment & Plan  Lab Results   Component Value Date    HGBA1C 6.5 (H) 11/07/2023       Recent Labs     02/10/24  1558 02/10/24  2132 02/11/24  0617 02/11/24  1041   POCGLU 177* 207* 128 122         Blood Sugar Average: Last 72 hrs:  (P) 158.125  Home: Januvia 100mg daily, metformin 1000mg daily  Here: Metformin 500mg 2x daily  Continue accuchecks and SSI.  Blood sugars improving.             The above assessment and plan was reviewed and updated as determined by my evaluation of the  patient on 2/11/2024.    Labs:   Results from last 7 days   Lab Units 02/09/24  0540 02/07/24  0935   WBC Thousand/uL 9.90 10.39*   HEMOGLOBIN g/dL 10.3* 10.2*   HEMATOCRIT % 32.3* 32.0*   PLATELETS Thousands/uL 367 344     Results from last 7 days   Lab Units 02/09/24  0540 02/07/24  0935   SODIUM mmol/L 138 137   POTASSIUM mmol/L 4.4 4.4   CHLORIDE mmol/L 100 101   CO2 mmol/L 30 31   BUN mg/dL 14 16   CREATININE mg/dL 0.59* 0.62   CALCIUM mg/dL 10.0 9.4             Results from last 7 days   Lab Units 02/11/24  1041 02/11/24  0617 02/10/24  2132   POC GLUCOSE mg/dl 122 128 207*       Imaging  No orders to display       Review of Scheduled Meds:  Current Facility-Administered Medications   Medication Dose Route Frequency Provider Last Rate    acetaminophen  975 mg Oral Q8H JILLIAN Denisse Berger PA-C      albuterol  2 puff Inhalation Q6H PRN Denisse Berger PA-C      AMILoride  5 mg Oral Daily Denisse Berger PA-C      aspirin  81 mg Oral Daily Denisse Berger PA-C      atorvastatin  10 mg Oral Daily With Dinner Denisse Berger PA-C      bisacodyl  10 mg Rectal Daily Denisse Berger PA-C      buPROPion  300 mg Oral Daily Denisse Berger PA-C      clonazePAM  1 mg Oral QPM Denisse Berger PA-C      docusate sodium  100 mg Oral BID Denisse Berger PA-C      fish oil  1,000 mg Oral Daily Denisse Berger PA-C      fluticasone-vilanterol  1 puff Inhalation Daily Denisse Berger PA-C      insulin lispro  1-5 Units Subcutaneous TID AC Denisse Berger PA-C      insulin lispro  1-5 Units Subcutaneous HS Denisse Berger PA-C      metFORMIN  500 mg Oral BID With Meals Denisse Berger PA-C      methocarbamol  250 mg Oral Q8H JILLIAN Denisse Berger PA-C      nicotine  1 patch Transdermal Daily Denisse Berger PA-C      ondansetron  4 mg Oral Q6H PRN Denisse Berger PA-C      oxybutynin  5 mg Oral Daily Denisse Berger PA-C      oxyCODONE  2.5 mg  Oral Q4H PRN Denisse Berger PA-C      Or    oxyCODONE  5 mg Oral Q4H PRN Denisse Berger PA-C      pantoprazole  40 mg Oral Early Morning Denisse Berger PA-C      polyethylene glycol  17 g Oral Daily Denisse Berger PA-C      pregabalin  200 mg Oral TID Denisse Berger PA-C      rivaroxaban  2.5 mg Oral BID With Meals Denisse Berger PA-C      senna  2 tablet Oral HS Denisse Berger PA-C         Subjective/ HPI: Patient seen and examined. Patients overnight issues or events were reviewed with nursing staff. New or overnight issues include the following:     Pt seen in her room. She states that she is doing well. She denies any current complaints.    ROS:   A 10 point ROS was performed; negative except as noted above.        *Labs /Radiology studies Reviewed  *Medications  reviewed and reconciled as needed  *Please refer to order section for additional ordered labs studies      Physical Examination:  Vitals:   Vitals:    02/10/24 0555 02/10/24 1317 02/10/24 2100 02/11/24 0551   BP: 113/59 124/58 131/58 121/55   BP Location: Left arm Left arm Left arm Left arm   Pulse: 80 85 83 74   Resp: 18 20 16 16   Temp: 98.1 °F (36.7 °C) 98.6 °F (37 °C) 98.3 °F (36.8 °C) 97.9 °F (36.6 °C)   TempSrc: Oral Oral Oral Oral   SpO2: 95% 94% 93% 92%   Weight:       Height:           General Appearance: NAD; pleasant  HEENT: PERRLA, conjuctiva normal; mucous membranes moist; face symmetrical  Neck:  Supple  Lungs: clear bilaterally, normal respiratory effort, no retractions, expiratory effort normal, on room air  CV: regular rate and rhythm, no murmurs rubs or gallops noted   ABD: soft non tender, +BS x4  EXT: DP pulses intact, no lymphadenopathy, no edema  Skin: normal turgor, normal texture, no rash, Rt LE VAC with serosanguinous drainage. Rt chest hematoma/seroma dressed.  Psych: affect normal, mood normal  Neuro: AAOx3       The above physical exam was reviewed and updated as determined by my  evaluation of the patient on 2/11/2024.    Invasive Devices       Drain  Duration             Ureteral Internal Stent Left ureter 6 Fr. 83 days                       VTE Pharmacologic Prophylaxis: Xarelto  Code Status: Level 1 - Full Code  Current Length of Stay: 2 day(s)    Total floor / unit time spent today 30 minutes  Coordination of patient's care was performed in conjunction with primary service. Time invested included review of patient's labs, vitals, and management of their comorbidities with continued monitoring, examination of patient as well as answering patient questions, documenting her findings and creating progress note in electronic medical record,  ordering appropriate diagnostic testing.       ** Please Note:  voice to text software may have been used in the creation of this document. Although proof errors in transcription or interpretation are a potential of such software**

## 2024-02-11 NOTE — ASSESSMENT & PLAN NOTE
Continue ASA, Xarelto and atorvastatin.  Encourage tobacco cessation.  May need a price check on Xarelto.

## 2024-02-11 NOTE — ASSESSMENT & PLAN NOTE
Continue Breo and albuterol.  Pt uses Advair 250/50 and albuterol at home.  Monitor respiratory status.

## 2024-02-11 NOTE — ASSESSMENT & PLAN NOTE
Lab Results   Component Value Date    HGBA1C 6.5 (H) 11/07/2023       Recent Labs     02/10/24  1558 02/10/24  2132 02/11/24  0617 02/11/24  1041   POCGLU 177* 207* 128 122         Blood Sugar Average: Last 72 hrs:  (P) 158.125  Podiatry to follow  Continue VAC dressing.  Follow-up outpt for potential split thickness skin graft.  Observe off abx.

## 2024-02-11 NOTE — PLAN OF CARE
Problem: Prexisting or High Potential for Compromised Skin Integrity  Goal: Skin integrity is maintained or improved  Description: INTERVENTIONS:  - Identify patients at risk for skin breakdown  - Assess and monitor skin integrity  - Assess and monitor nutrition and hydration status  - Monitor labs   - Assess for incontinence   - Turn and reposition patient  - Assist with mobility/ambulation  - Relieve pressure over bony prominences  - Avoid friction and shearing  - Provide appropriate hygiene as needed including keeping skin clean and dry  - Evaluate need for skin moisturizer/barrier cream  - Collaborate with interdisciplinary team   - Patient/family teaching  - Consider wound care consult   Outcome: Progressing     Problem: PAIN - ADULT  Goal: Verbalizes/displays adequate comfort level or baseline comfort level  Description: Interventions:  - Encourage patient to monitor pain and request assistance  - Assess pain using appropriate pain scale  - Administer analgesics based on type and severity of pain and evaluate response  - Implement non-pharmacological measures as appropriate and evaluate response  - Consider cultural and social influences on pain and pain management  - Notify physician/advanced practitioner if interventions unsuccessful or patient reports new pain  Outcome: Progressing     Problem: INFECTION - ADULT  Goal: Absence or prevention of progression during hospitalization  Description: INTERVENTIONS:  - Assess and monitor for signs and symptoms of infection  - Monitor lab/diagnostic results  - Monitor all insertion sites, i.e. indwelling lines, tubes, and drains  - Monitor endotracheal if appropriate and nasal secretions for changes in amount and color  - [place] appropriate cooling/warming therapies per order  - Administer medications as ordered  - Instruct and encourage patient and family to use good hand hygiene technique  - Identify and instruct in appropriate isolation precautions for  identified infection/condition  Outcome: Progressing     Problem: SAFETY ADULT  Goal: Patient will remain free of falls  Description: INTERVENTIONS:  - Educate patient/family on patient safety including physical limitations  - Instruct patient to call for assistance with activity   - Consult OT/PT to assist with strengthening/mobility   - Keep Call bell within reach  - Keep bed low and locked with side rails adjusted as appropriate  - Keep care items and personal belongings within reach  - Initiate and maintain comfort rounds  - Make Fall Risk Sign visible to staff  - Offer Toileting every  Hours, in advance of need  - Initiate/Maintain alarm  - Obtain necessary fall risk management equipment:   - Apply yellow socks and bracelet for high fall risk patients  - Consider moving patient to room near nurses station  Outcome: Progressing  Goal: Maintain or return to baseline ADL function  Description: INTERVENTIONS:  -  Assess patient's ability to carry out ADLs; assess patient's baseline for ADL function and identify physical deficits which impact ability to perform ADLs (bathing, care of mouth/teeth, toileting, grooming, dressing, etc.)  - Assess/evaluate cause of self-care deficits   - Assess range of motion  - Assess patient's mobility; develop plan if impaired  - Assess patient's need for assistive devices and provide as appropriate  - Encourage maximum independence but intervene and supervise when necessary  - Involve family in performance of ADLs  - Assess for home care needs following discharge   - Consider OT consult to assist with ADL evaluation and planning for discharge  - Provide patient education as appropriate  Outcome: Progressing  Goal: Maintains/Returns to pre admission functional level  Description: INTERVENTIONS:  - Perform AM-PAC 6 Click Basic Mobility/ Daily Activity assessment daily.  - Set and communicate daily mobility goal to care team and patient/family/caregiver.   - Collaborate with  rehabilitation services on mobility goals if consulted  - Perform Range of Motion  times a day.  - Reposition patient every  hours.  - Dangle patient  times a day  - Stand patient  times a day  - Ambulate patient  times a day  - Out of bed to chair  times a day   - Out of bed for meals  times a day  - Out of bed for toileting  - Record patient progress and toleration of activity level   Outcome: Progressing     Problem: DISCHARGE PLANNING  Goal: Discharge to home or other facility with appropriate resources  Description: INTERVENTIONS:  - Identify barriers to discharge w/patient and caregiver  - Arrange for needed discharge resources and transportation as appropriate  - Identify discharge learning needs (meds, wound care, etc.)  - Arrange for interpretive services to assist at discharge as needed  - Refer to Case Management Department for coordinating discharge planning if the patient needs post-hospital services based on physician/advanced practitioner order or complex needs related to functional status, cognitive ability, or social support system  Outcome: Progressing

## 2024-02-11 NOTE — PROGRESS NOTES
"   02/11/24 1230   Pain Assessment   Pain Assessment Tool 0-10   Pain Score 3   Pain Location/Orientation Orientation: Right;Location: Shoulder   Pain Onset/Description Onset: Gradual   Effect of Pain on Daily Activities Tolerates   Patient's Stated Pain Goal No pain   Hospital Pain Intervention(s) Rest  (Cold pack offered, pt declined)   Multiple Pain Sites No   Restrictions/Precautions   Precautions Bed/chair alarms;Fall Risk;Pain;Hard of hearing;Cognitive;Supervision on toilet/commode  (RLE wound vac)   Weight Bearing Restrictions Yes   RLE Weight Bearing Per Order NWB   ROM Restrictions No   Braces or Orthoses Other (Comment)  (RLE prevalon boot)   Lifestyle   Autonomy \"I am basically the caregiver for my  and dtr.\"   Bed-Chair Transfer   Type of Assistance Needed Physical assistance;Verbal cues   Physical Assistance Level 26%-50%   Comment Margaux modified sit pivot EOB <> W/C, W/C <> EOM; Cues to achieve safe positioning of W/C and EDU provided on W/C management of brakes/arm rests   Chair/Bed-to-Chair Transfer CARE Score 3   Kitchen Mobility   Kitchen-Mobility Level Wheelchair   Kitchen Activity Retrieve items;Transport items   Kitchen Mobility Comments EDU provided on keeping freq used items within reach to avoid performing STS's to OH cabinets and other areas out of reach, as pt is currently NWB to RLE. EDU also provided on safe positioning of W/C prior to reaching for items and managing W.C brakes. Trialed fxnl W/C mobility around OT kitchen to retrieve items from fridge, kitchen drawers, sink, and microwave environment. Dec recall for brake management and often observed over reaching for items 2/2 to P positioning of W/C. Pt would continue from further practice during upcoming sessions.   Light Housekeeping   Light Housekeeping Pt reports dtr to assist with laundry upon D/C, as washer/dryer are located in basement of pt's home.   Therapeutic Excerise-Strength   UE Strength Yes   Right Upper Extremity- " "Strength   R Shoulder Flexion;Horizontal ABduction;External rotation;Internal rotation;Extension   R Elbow Elbow flexion;Elbow extension   R Position Seated   Equipment Dumbbell  (2#)   R Weight/Reps/Sets 2 sets of 15 reps   RUE Strength Comment Engaged in seated light UE TE utilizing 2# dumbbell to perform 2 sets of 15 reps of indicated planes with focus on increasing UE strength for fxnl transfer's and W/C mobility, as pt is currently NWB to RLE. Pt toelrated well, min c/o of discomfort in R shoulder, as pt has Hx of anold shoulder injury.   Left Upper Extremity-Strength   LUE Strength Comment See above.   Cognition   Overall Cognitive Status Impaired   Arousal/Participation Alert;Cooperative   Attention Attends with cues to redirect   Orientation Level Oriented X4   Memory Decreased recall of precautions   Following Commands Follows one step commands with increased time or repetition   Activity Tolerance   Activity Tolerance Patient tolerated treatment well   Medical Staff Made Aware /57   Assessment   Treatment Assessment Pt participated in 90 min skilled OT Tx session with focus on fxnl transfer's, kitche mobility, W/C mobility/management, and UE TE. See above for further Tx details. Overall Margaux for modified sit pivot transfer's, as pt attempts to stand at times req occassional cues to maintain RLE NWB. Dec safety with W/C brake management and safe positioning of W/C prior to performing transfer's. Pt would benefit from extensive practice during upcoming session. W/C swapped to 18\" W/C with swing away armrests to promote IND with W/C management. Pt would continue to benefit from skilled OT services to improve safety with W/C management, improve fxnl sit pivot transfer's, increase strength, initiate DME needs, and maximize IND with ADL/IADL performance in roder to prgoress towards OT goals and dec caregievr burden upon D/C.   Prognosis Fair   Problem List Decreased strength;Decreased endurance;Impaired " balance;Decreased mobility;Decreased safety awareness;Impaired hearing;Orthopedic restrictions;Pain   Barriers to Discharge Inaccessible home environment;Decreased caregiver support   Plan   Treatment/Interventions ADL retraining;Functional transfer training;Therapeutic exercise;Endurance training   Progress Progressing toward goals   Discharge Recommendation   Rehab Resource Intensity Level, OT   (Pending)   Equipment Recommended   (TBD)   OT Therapy Minutes   OT Time In 1230   OT Time Out 1400   OT Total Time (minutes) 90   OT Mode of treatment - Individual (minutes) 90   OT Mode of treatment - Concurrent (minutes) 0   OT Mode of treatment - Group (minutes) 0   OT Mode of treatment - Co-treat (minutes) 0   OT Mode of Treatment - Total time(minutes) 90 minutes   OT Cumulative Minutes 180   Therapy Time missed   Time missed? No

## 2024-02-11 NOTE — ASSESSMENT & PLAN NOTE
Lab Results   Component Value Date    HGBA1C 6.5 (H) 11/07/2023       Recent Labs     02/10/24  1558 02/10/24  2132 02/11/24  0617 02/11/24  1041   POCGLU 177* 207* 128 122         Blood Sugar Average: Last 72 hrs:  (P) 158.125  Home: Januvia 100mg daily, metformin 1000mg daily  Here: Metformin 500mg 2x daily  Continue accuchecks and SSI.  Blood sugars improving.

## 2024-02-12 LAB
GLUCOSE SERPL-MCNC: 125 MG/DL (ref 65–140)
GLUCOSE SERPL-MCNC: 135 MG/DL (ref 65–140)
GLUCOSE SERPL-MCNC: 139 MG/DL (ref 65–140)
GLUCOSE SERPL-MCNC: 153 MG/DL (ref 65–140)

## 2024-02-12 PROCEDURE — 99232 SBSQ HOSP IP/OBS MODERATE 35: CPT | Performed by: PODIATRIST

## 2024-02-12 PROCEDURE — 99232 SBSQ HOSP IP/OBS MODERATE 35: CPT | Performed by: STUDENT IN AN ORGANIZED HEALTH CARE EDUCATION/TRAINING PROGRAM

## 2024-02-12 PROCEDURE — 97530 THERAPEUTIC ACTIVITIES: CPT

## 2024-02-12 PROCEDURE — 99232 SBSQ HOSP IP/OBS MODERATE 35: CPT | Performed by: INTERNAL MEDICINE

## 2024-02-12 PROCEDURE — 82948 REAGENT STRIP/BLOOD GLUCOSE: CPT

## 2024-02-12 PROCEDURE — 97535 SELF CARE MNGMENT TRAINING: CPT

## 2024-02-12 PROCEDURE — 97605 NEG PRS WND THER DME<=50SQCM: CPT | Performed by: PODIATRIST

## 2024-02-12 PROCEDURE — 97110 THERAPEUTIC EXERCISES: CPT

## 2024-02-12 RX ADMIN — DOCUSATE SODIUM 100 MG: 100 CAPSULE, LIQUID FILLED ORAL at 10:27

## 2024-02-12 RX ADMIN — METHOCARBAMOL 250 MG: 500 TABLET ORAL at 15:01

## 2024-02-12 RX ADMIN — METHOCARBAMOL 250 MG: 500 TABLET ORAL at 06:06

## 2024-02-12 RX ADMIN — RIVAROXABAN 2.5 MG: 2.5 TABLET, FILM COATED ORAL at 17:37

## 2024-02-12 RX ADMIN — OMEGA-3 FATTY ACIDS CAP 1000 MG 1000 MG: 1000 CAP at 10:27

## 2024-02-12 RX ADMIN — ASPIRIN 81 MG: 81 TABLET, COATED ORAL at 10:27

## 2024-02-12 RX ADMIN — INSULIN LISPRO 1 UNITS: 100 INJECTION, SOLUTION INTRAVENOUS; SUBCUTANEOUS at 08:55

## 2024-02-12 RX ADMIN — ACETAMINOPHEN 975 MG: 325 TABLET, FILM COATED ORAL at 06:06

## 2024-02-12 RX ADMIN — PREGABALIN 200 MG: 100 CAPSULE ORAL at 22:00

## 2024-02-12 RX ADMIN — PREGABALIN 200 MG: 100 CAPSULE ORAL at 10:27

## 2024-02-12 RX ADMIN — DOCUSATE SODIUM 100 MG: 100 CAPSULE, LIQUID FILLED ORAL at 17:37

## 2024-02-12 RX ADMIN — ATORVASTATIN CALCIUM 10 MG: 10 TABLET, FILM COATED ORAL at 17:38

## 2024-02-12 RX ADMIN — ACETAMINOPHEN 975 MG: 325 TABLET, FILM COATED ORAL at 15:01

## 2024-02-12 RX ADMIN — FLUTICASONE FUROATE AND VILANTEROL TRIFENATATE 1 PUFF: 200; 25 POWDER RESPIRATORY (INHALATION) at 10:29

## 2024-02-12 RX ADMIN — METFORMIN HYDROCHLORIDE 500 MG: 500 TABLET ORAL at 08:54

## 2024-02-12 RX ADMIN — BUPROPION HYDROCHLORIDE 300 MG: 150 TABLET, EXTENDED RELEASE ORAL at 10:28

## 2024-02-12 RX ADMIN — OXYBUTYNIN CHLORIDE 5 MG: 5 TABLET, EXTENDED RELEASE ORAL at 10:27

## 2024-02-12 RX ADMIN — SENNOSIDES 17.2 MG: 8.6 TABLET, FILM COATED ORAL at 22:48

## 2024-02-12 RX ADMIN — OXYCODONE HYDROCHLORIDE 5 MG: 5 TABLET ORAL at 10:27

## 2024-02-12 RX ADMIN — POLYETHYLENE GLYCOL 3350 17 G: 17 POWDER, FOR SOLUTION ORAL at 10:26

## 2024-02-12 RX ADMIN — CLONAZEPAM 1 MG: 1 TABLET ORAL at 17:38

## 2024-02-12 RX ADMIN — AMILORIDE HYDROCLORIDE 5 MG: 5 TABLET ORAL at 10:27

## 2024-02-12 RX ADMIN — PREGABALIN 200 MG: 100 CAPSULE ORAL at 17:37

## 2024-02-12 RX ADMIN — METHOCARBAMOL 250 MG: 500 TABLET ORAL at 22:49

## 2024-02-12 RX ADMIN — NICOTINE 1 PATCH: 21 PATCH, EXTENDED RELEASE TRANSDERMAL at 10:29

## 2024-02-12 RX ADMIN — ACETAMINOPHEN 975 MG: 325 TABLET, FILM COATED ORAL at 22:49

## 2024-02-12 RX ADMIN — PANTOPRAZOLE SODIUM 40 MG: 40 TABLET, DELAYED RELEASE ORAL at 08:54

## 2024-02-12 RX ADMIN — RIVAROXABAN 2.5 MG: 2.5 TABLET, FILM COATED ORAL at 08:54

## 2024-02-12 RX ADMIN — METFORMIN HYDROCHLORIDE 500 MG: 500 TABLET ORAL at 17:37

## 2024-02-12 NOTE — PROGRESS NOTES
"   02/12/24 0900   Pain Assessment   Pain Assessment Tool 0-10   Pain Score 7   Pain Location/Orientation Orientation: Right;Location: Shoulder   Pain Onset/Description Onset: Ongoing   Effect of Pain on Daily Activities affects w/c mobility   Hospital Pain Intervention(s) Emotional support;Rest  (NSG Silvia made aware)   Restrictions/Precautions   Precautions Fall Risk;Bed/chair alarms;Pain;Hard of hearing;Supervision on toilet/commode  (RLE wound vac)   Weight Bearing Restrictions Yes   RLE Weight Bearing Per Order NWB   ROM Restrictions No   Braces or Orthoses   (RLE prevalon boot for transfers)   Lifestyle   Autonomy \"Let's do this\"   Lower Body Dressing   Type of Assistance Needed Physical assistance   Physical Assistance Level Total assistance   Comment As reflection of PLOF-would require Ax2 without AD/AE, on this date seated on mat practiced threading wound vac line through RLE. Pt able to doff/don large hospital pants fully seated by weightshifting laterally overall with min A. To don pants over free pt utilizes X technique. Pt with good ability to maintain RLE NWB in seated position. Therapist manages wound vac controls.   Lower Body Dressing CARE Score 1   Putting On/Taking Off Footwear   Type of Assistance Needed Supervision   Physical Assistance Level No physical assistance   Comment Seated EOM, pt able to doff/don L hospital sock with and without AE close S utilizing X technique.  Pt reports she owns reacher (not utilizing for ADLs previously) and LH shoe horn at home. Therapist demonstrates how to utilize reacher to doff sock and retrieve from floor, utilizing sock aid to don sock, and LH shoe horn to don shoe. With practice, pt able to demonstrate without cuing on second attempt with close supervision. To assess during ADL use of LBAE for carryover due to cognitive deficits. Pt would benefit from use of LBAE to maintain WB precautions and reduce risk of falls.   Putting On/Taking Off Footwear CARE Score " 4   Bed-Chair Transfer   Type of Assistance Needed Incidental touching   Physical Assistance Level No physical assistance   Comment sit pivot from w/c <-> mat table CGA, pt with good ability to manage w/c brakes and set-up w/c - requires assistance to doff/don prevalon boot and remove RLE w/c leg rest, pt requires only single cue to lower w/c arm rest once transfer completed. Patient able to complete w/c mobility with BUEs and LLE - requiring cues to utilizes LLE. Increased time and rest break required therapy gym -> room.   Chair/Bed-to-Chair Transfer CARE Score 4   Toilet Transfer   Comment Back in pt's room, therapist removes standard BSC and brings out drop arm standard commode from bathroom as pt prefers to use in room. To trial in upcoming session later this afternoon.   Cognition   Overall Cognitive Status Impaired   Arousal/Participation Alert;Cooperative   Attention Attends with cues to redirect   Orientation Level Oriented X4   Memory Decreased recall of precautions   Following Commands Follows one step commands with increased time or repetition   Comments More compliant to RLE NWB than noted in previous therapy sessions   Additional Activities   Additional Activities Comments End of session, pt requires Ax1 from therapist to complete boost from w/c while maintaining RLE NWB, while 2nd person places alarm pad on w/c. Patient prefers to sit in w/c at this time. Assistance required to don R leg rest.   Activity Tolerance   Activity Tolerance Patient tolerated treatment well   Medical Staff Made Aware PAULINA Vega made aware of pt's R shoulder pain and request from pain med other than Tylenol   Assessment   Treatment Assessment Patient engaged in 60 min treatment session with focus on ADL retraining while laterally weight shifting seated, education/trial of LBAE, functional transfers, sitting balance/tolerance, w/c mobility and management, and assessment of DME.. CLOF pt completes sit pivot transfers CGA with  better follow through of RLE NWB and ability to manage w/c controls. Pt able to complete w/c mobility therapy gym-> room with increased time and single rest break - would benefit from continued education to utilize LLE as well as BUEs.  See above for treatment details. Patient would benefit from upcoming scheduled ADL to be completed EOB as pt exhibits G sitting balance during LB dressing tasks on this date and continued trial/assessment of LBAE for carryover of today's session. To trial sit pivot transfer to drop arm standard commode later this afternoon. Continue OT plan of care with focus on ADL training, functional transfers, sitting balance/tolerance activity, IADLs as w/c level, w/c mgmt and mobility, strengthening, and overall activity tolerance. .   Prognosis Fair   Problem List Decreased strength;Decreased endurance;Impaired balance;Decreased mobility;Decreased cognition;Decreased safety awareness;Pain;Orthopedic restrictions   Barriers to Discharge Inaccessible home environment;Decreased caregiver support   Plan   Treatment/Interventions ADL retraining;Functional transfer training;Therapeutic exercise;Endurance training;Cognitive reorientation;Patient/family training;Equipment eval/education;Compensatory technique education;Spoke to nursing   Progress Progressing toward goals   Discharge Recommendation   Rehab Resource Intensity Level, OT   (home OT)   OT Therapy Minutes   OT Time In 0900   OT Time Out 1000   OT Total Time (minutes) 60   OT Mode of treatment - Individual (minutes) 60   OT Mode of treatment - Concurrent (minutes) 0   OT Mode of treatment - Group (minutes) 0   OT Mode of treatment - Co-treat (minutes) 0   OT Mode of Treatment - Total time(minutes) 60 minutes   OT Cumulative Minutes 240   Therapy Time missed   Time missed? No

## 2024-02-12 NOTE — ASSESSMENT & PLAN NOTE
S/p right axillary to femoral BPG 1/31/24  Continue ASA, Xarelto and atorvastatin.  Encourage tobacco cessation.  Price check on Xarelto = $20.00/mo

## 2024-02-12 NOTE — PROGRESS NOTES
"Podiatry - Progress Note  Patient: Lona Cedeno 78 y.o. female   MRN: [id number]  PCP: Vivek Preston DO  Unit/Bed#: -01 Encounter: [id number]  Date Of Visit: 24    ASSESSMENT:    Lona Cedeno is a 78 y.o. 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 All wounds, including 2nd digit amputation site, remain stable with no acute clinical signs of infection. Posterior leg wound noted to be granulating well at this time.   Plan to continue wound VAC therapy at this time with next change on 23. May need STSG application to RLE at a later date, possibly requiring re-admission further down the line.  Podiatry to handle all dressing changes to RLE at this time.   Use Prevalon boot to RLE to offload while non-ambulatory.  Elevation and offloading on green foam wedges or pillows when non-ambulatory.  Rest of care per primary team.     Weightbearing status: Non-weightbearing right foot    Wound VAC application  1. Number of sponges removed: 1  2. Pressure settin continuous  3. Size of wound: 5.8 x 4 x 0.3 cm  4. Description of wound: granular, bleeding tendon exposed  5. Number of sponges applied: 1    SUBJECTIVE:     The patient was seen, evaluated, and assessed at bedside today. The patient was awake, alert, and in no acute distress. No acute events overnight. The patient reports pain upon dressing change. Patient denies N/V/F/chills/SOB/CP.      OBJECTIVE:     Vitals:   /68 (BP Location: Left arm)   Pulse 80   Temp 98.2 °F (36.8 °C) (Oral)   Resp 18   Ht 5' 1\" (1.549 m)   Wt 69.5 kg (153 lb 3.5 oz)   SpO2 94%   BMI 28.95 kg/m²     Temp (24hrs), Av.1 °F (36.7 °C), Min:98 °F " "(36.7 °C), Max:98.2 °F (36.8 °C)      Physical Exam:     Lungs: Non labored breathing  Abdomen: Soft, non-tender.  Lower Extremity:  Cardiovascular status at baseline from admission.  Neurological status at baseline from admission.  Musculoskeletal status at baseline from admission. No calf tenderness noted.     Wound #: 1  Location: right posterior leg  Length 5.8 cm: Width 4cm: Depth 0.3cm:   Deepest Tissue Noted in Base: tendon  Probe to Bone: No  Peripheral Skin Description: Attached  Granulation: 70% Fibrotic Tissue: 30% Necrotic Tissue: 0%   Drainage Amount: moderate bloody  Signs of Infection: No    Wound #: 2  Location: medial foot  Length 2.2cm: Width 2.8cm: Depth 0.2cm:   Deepest Tissue Noted in Base: subcutaneous  Probe to Bone: No  Peripheral Skin Description: Attached  Granulation: 90% Fibrotic Tissue: 10% Necrotic Tissue: 0%   Drainage Amount: moderate serosanguinous  Signs of Infection: No    Right second digit amputation site noted to be stable, all sutures intact, no erythema, no drainage, no fluctuance.     Clinical Images 02/12/24:                Additional Data:     Labs:    Results from last 7 days   Lab Units 02/09/24  0540   WBC Thousand/uL 9.90   HEMOGLOBIN g/dL 10.3*   HEMATOCRIT % 32.3*   PLATELETS Thousands/uL 367   NEUTROS PCT % 62   LYMPHS PCT % 21   MONOS PCT % 8   EOS PCT % 8*     Results from last 7 days   Lab Units 02/09/24  0540   POTASSIUM mmol/L 4.4   CHLORIDE mmol/L 100   CO2 mmol/L 30   BUN mg/dL 14   CREATININE mg/dL 0.59*   CALCIUM mg/dL 10.0           * I Have Reviewed All Lab Data Listed Above.    Recent Cultures (last 7 days):               Imaging: I have personally reviewed pertinent films in PACS  EKG, Pathology, and Other Studies: I have personally reviewed pertinent reports.    ** Please Note: Portions of the record may have been created with voice recognition software. Occasional wrong word or \"sound a like\" substitutions may have occurred due to the inherent " limitations of voice recognition software. Read the chart carefully and recognize, using context, where substitutions have occurred. **

## 2024-02-12 NOTE — ASSESSMENT & PLAN NOTE
Continue Breo and albuterol.  Pt uses Advair 250/50 and albuterol at home.  Respiratory status stable

## 2024-02-12 NOTE — ASSESSMENT & PLAN NOTE
S/p I&D hematoma/seroma of right axillary dissection operative site 2/4/24  Will watch site  Had reaccumulated after having it drained = stable

## 2024-02-12 NOTE — PROGRESS NOTES
PM&R PROGRESS NOTE:  Lona Cedeno 78 y.o. female MRN: [id number]  Unit/Bed#: -01 Encounter: [id number]        Rehabilitation Diagnosis: Impairment of mobility, safety and Activities of Daily Living (ADLs) due to Other Disabling Impairments:  13  Other Disabling Impairments    Etiologic: Diabetic right foot ulcer with osteomyelitis s/p fem bypass, right foot wound and achilles debridement with 2nd toe partial amputation and VAC placement   Date of Onset: 2/26/24     Date of surgery: 1/31, 2/4    HPI: Lona Cedeno is a 78 y.o. female who  has a past medical history of Anxiety, Closed fracture of coccyx ([facility]) (05/24/2023), Diabetes mellitus ([facility]), GERD (gastroesophageal reflux disease), Hyperlipidemia, Hypertension, IBS (irritable bowel syndrome), and Shoulder fracture. who presented to the [facility] on 1/23/2024 from her podiatry office with a diabetic ulcer of the right toe and right ankle wound with achilles tendon exposed. She was found to have osteomyelitis of the right foot and was transferred to [place] for consideration of revascularization and surgical opinion. She was recommended to undergo extra-anatomic bypass of the right axillary to femoral artery and right trans-tibial amputation was initially discussed, but decision was made to attempt limb salvage. She underwent the above bypass on 1/31/24 and then on 2/4/24 underwent right foot wound and Achilles debridement with 2nd toe partial amputation and VAC placement. Split-thickness skin graft was discussed among podiatry and plastic surgery, but has been deferred to a later time. Her hospital course was complicated by right chest wall hematoma and she underwent evacuation with vascular on 2/4/2024. VAC last changed on 2/8/24. No further OR interventions planned during this admission.     SUBJECTIVE: Patient seen face to face.  No acute issues.  Progressing as expected in rehabilitation.  She states her pain has been  overall well-controlled on the current regimen and is having some difficulty with the nonweightbearing status but is following her restrictions.  This however is more difficult with stairs and some transfer activities.  Overall she remains motivated and is in good spirits.  She denies any fever, chills, nausea, vomiting, cough, shortness of breath, diarrhea or constipation.  She stated podiatry came to see her today.  And the plan to continue with the wound VAC changes every other day at this time with the next being on 2/14.  The VAC was changed today and wounds were assessed.    ASSESSMENT: Stable, progressing, VAC changed today      PLAN:    Rehabilitation  Functional deficits:  self care and mobility  Continue current rehabilitation plan of care to maximize function.    Functional update:   PT: Bed mobility mod to max assist, transfers total assist and ambulation without an assistive device however can ambulate 10 feet with rolling walker hopping minimal assistance with the chair follow  OT: Total assistance for shower/bathing, max assist for oral hygiene, total assist for footwear and dressing, total assist for toileting    Estimated Discharge: To be determined in team conference      Pain  Acetaminophen 975mg every 8 hours  Robaxin 250 mg every 8 hours  Lyrica 200 mg 3 times daily  Oxycodone 2.5-5 mg every 4 hours as needed    DVT prophylaxis  On Xarelto 2.5 mg twice daily    Bladder plan  Continent    Bowel plan  Continent  Last bowel movement 2/11      * Diabetic foot ulcer with osteomyelitis (HCC)  Assessment & Plan  - Now monitoring off antibiotics per prior ID recommendations. S/p right axillary to femoral bypass on 1/31/24 as well as right foot wound and Achilles debridement with 2nd digit partial amputation and VAC placement on 2/4/24  - Trans-tibial amputation has been deferred at this time while monitoring on re-vascularization  - Podiatry and plastic surgery discussed case, no further OR  interventions are planned during this admission, split-thickness skin graft may be considered in the future.   - Continue PT and OT  - NWB RLE      Acute pain due to injury  Assessment & Plan  - Tylenol 975 mg q8h scheduled  - Methocarbamol 250 mg q8h scheduled  - Pregabalan 200 mg TID scheduled  - Oxycodone 2.5 or 5 mg q4h PRN  - Will wean as tolerated    Impaired mobility and activities of daily living  Assessment & Plan  - Rehabilitation medicine physician for daily monitoring of care, 24 hour availability for acute  medical issues, medication management, and therapeutic and diagnostic assessments.  - 24 hour rehabilitation nursing 7 days per week for: management/teaching of medications,  bowel/bladder routine, skin care.  - PT, OT for 2-3 hours per day, 5 to 6 days per week; 15 hours per week  - Rehabilitation Psychology for adjustment and coping  - MSW for barriers to discharge, community resources, and family support  - Discharge planning following to help ensure a safe and efficient discharge      Wound of skin  Assessment & Plan  - Continue local wound care consisting of wound VAC to posterior right ankle ulcer with Dermagran to the anterior ankle wound and Betadine Adaptic to the right second partial toe amputation site  - Elevation on green foam wedges or pillows when non-ambulatory.  - Podiatry to follow for VAC changes and assessments  - Monitor clinically for signs of infection    At high risk for skin breakdown  Assessment & Plan  - Monitor for breakdown, frequent turns      At risk for venous thromboembolism (VTE)  Assessment & Plan  - On rivaroxaban    Constipation  Assessment & Plan  - Bowel regimen: Bisacodyl suppository daily, senna 2 tablets qHs, miralax daily scheduled  - Will follow BMs and adjust bowel regimen to target soft, formed stools q1-2 days, per pt's regular schedule.      Urinary incontinence  Assessment & Plan  - Will follow UOP and encourage spontaneous voids on timed void  "schedule. If unable to void spontaneously, will monitor with PVR bladder scans and initiate ISC regimen.  - Oxybutynin 5 mg daily      Anxiety and depression  Assessment & Plan  - Bupropion 300 mg daily  - Clonazepam 1 mg qHs          Appreciate IM consultants medical co-management.  Labs, medications, and imaging personally reviewed.      ROS:  A ten point review of systems was completed on 02/12/24 and pertinent positives are listed in subjective section. All other systems reviewed were negative.     OBJECTIVE:   /68 (BP Location: Left arm)   Pulse 80   Temp 98.2 °F (36.8 °C) (Oral)   Resp 18   Ht 5' 1\" (1.549 m)   Wt 69.5 kg (153 lb 3.5 oz)   SpO2 94%   BMI 28.95 kg/m²     Physical Exam  Vitals reviewed.   Constitutional:       General: She is not in acute distress.  HENT:      Head: Normocephalic and atraumatic.      Right Ear: External ear normal.      Left Ear: External ear normal.      Nose: Nose normal. No rhinorrhea.      Mouth/Throat:      Mouth: Mucous membranes are moist.      Pharynx: Oropharynx is clear.   Eyes:      General: No scleral icterus.  Cardiovascular:      Rate and Rhythm: Normal rate.   Pulmonary:      Effort: Pulmonary effort is normal. No respiratory distress.   Abdominal:      General: There is no distension.      Palpations: Abdomen is soft.   Musculoskeletal:      Cervical back: Normal range of motion.      Comments: VAC in place   Skin:     General: Skin is warm and dry.   Neurological:      General: No focal deficit present.      Mental Status: She is oriented to person, place, and time.      Comments: Generalized nonneurologic weakness in the bilateral proximal lower extremities   Psychiatric:         Mood and Affect: Mood normal.         Behavior: Behavior normal.          Lab Results   Component Value Date    WBC 9.90 02/09/2024    HGB 10.3 (L) 02/09/2024    HCT 32.3 (L) 02/09/2024    MCV 97 02/09/2024     02/09/2024     Lab Results   Component Value Date    " SODIUM 138 02/09/2024    K 4.4 02/09/2024     02/09/2024    CO2 30 02/09/2024    BUN 14 02/09/2024    CREATININE 0.59 (L) 02/09/2024    GLUC 143 (H) 02/09/2024    CALCIUM 10.0 02/09/2024     Lab Results   Component Value Date    INR 1.03 02/04/2024    INR 1.11 02/03/2024    INR 1.03 01/23/2024    PROTIME 13.4 02/04/2024    PROTIME 14.2 02/03/2024    PROTIME 13.6 01/23/2024           Current Facility-Administered Medications:     acetaminophen (TYLENOL) tablet 975 mg, 975 mg, Oral, Q8H JILLIAN, Denissegrupo Fuller-Hank, PA-C, 975 mg at 02/12/24 0606    albuterol (PROVENTIL HFA,VENTOLIN HFA) inhaler 2 puff, 2 puff, Inhalation, Q6H PRN, Denisse Fuller-Hank PA-C    AMILoride tablet 5 mg, 5 mg, Oral, Daily, Denisse Alina-Hank, PA-C, 5 mg at 02/12/24 1027    aspirin (ECOTRIN LOW STRENGTH) EC tablet 81 mg, 81 mg, Oral, Daily, Denissegrupo Fuller-Hank, PA-C, 81 mg at 02/12/24 1027    atorvastatin (LIPITOR) tablet 10 mg, 10 mg, Oral, Daily With Dinner, Denisse Fuller-Hank, PA-C, 10 mg at 02/11/24 1606    bisacodyl (DULCOLAX) rectal suppository 10 mg, 10 mg, Rectal, Daily, Denisse Fuller-Hank, PA-C    buPROPion (WELLBUTRIN XL) 24 hr tablet 300 mg, 300 mg, Oral, Daily, Denisse Fuller-Hank, PA-C, 300 mg at 02/12/24 1028    clonazePAM (KlonoPIN) tablet 1 mg, 1 mg, Oral, QPM, Denisse Fuller-Hank, PA-C, 1 mg at 02/11/24 1821    docusate sodium (COLACE) capsule 100 mg, 100 mg, Oral, BID, Denisse Berger PA-C, 100 mg at 02/12/24 1027    fish oil capsule 1,000 mg, 1,000 mg, Oral, Daily, Denisse Berger PA-C, 1,000 mg at 02/12/24 1027    fluticasone-vilanterol 200-25 mcg/actuation 1 puff, 1 puff, Inhalation, Daily, Denisse Berger PA-C, 1 puff at 02/12/24 1029    insulin lispro (HumaLOG) 100 units/mL subcutaneous injection 1-5 Units, 1-5 Units, Subcutaneous, TID AC, 1 Units at 02/12/24 0855 **AND** Fingerstick Glucose (POCT), , , TID AC, Denisse Berger PA-C    insulin lispro (HumaLOG) 100 units/mL subcutaneous injection  1-5 Units, 1-5 Units, Subcutaneous, HS, Denisse Alina-Hank, PA-C, 1 Units at 02/10/24 2133    metFORMIN (GLUCOPHAGE) tablet 500 mg, 500 mg, Oral, BID With Meals, Denisse Alina-Randle, PA-C, 500 mg at 02/12/24 0854    methocarbamol (ROBAXIN) tablet 250 mg, 250 mg, Oral, Q8H JILLIAN, Denisse Alina-Randle, PA-C, 250 mg at 02/12/24 0606    nicotine (NICODERM CQ) 21 mg/24 hr TD 24 hr patch 1 patch, 1 patch, Transdermal, Daily, Denisse Fuller-Hank, PA-C, 1 patch at 02/12/24 1029    ondansetron (ZOFRAN-ODT) dispersible tablet 4 mg, 4 mg, Oral, Q6H PRN, Denisse Fuller-Hank, PA-C    oxybutynin (DITROPAN-XL) 24 hr tablet 5 mg, 5 mg, Oral, Daily, Denisse Alina-Randle, PA-C, 5 mg at 02/12/24 1027    oxyCODONE (ROXICODONE) split tablet 2.5 mg, 2.5 mg, Oral, Q4H PRN **OR** oxyCODONE (ROXICODONE) IR tablet 5 mg, 5 mg, Oral, Q4H PRN, Denisse Alina-Randle, PA-C, 5 mg at 02/12/24 1027    pantoprazole (PROTONIX) EC tablet 40 mg, 40 mg, Oral, Early Morning, Denissegrupo Fuller-Randle, PA-C, 40 mg at 02/12/24 0854    polyethylene glycol (MIRALAX) packet 17 g, 17 g, Oral, Daily, Denisse Alina-Hank, PA-C, 17 g at 02/12/24 1026    pregabalin (LYRICA) capsule 200 mg, 200 mg, Oral, TID, Denisse Alina-Randle, PA-C, 200 mg at 02/12/24 1027    rivaroxaban (XARELTO) tablet 2.5 mg, 2.5 mg, Oral, BID With Meals, Denisse FullerSABIHA Person, 2.5 mg at 02/12/24 0854    senna (SENOKOT) tablet 17.2 mg, 2 tablet, Oral, HS, Denissegrupo Berger PA-C    Past Medical History:   Diagnosis Date    Anxiety     Closed fracture of coccyx ([facility]) 05/24/2023    Diabetes mellitus ([facility])     GERD (gastroesophageal reflux disease)     Hyperlipidemia     Hypertension     IBS (irritable bowel syndrome)     Shoulder fracture        Patient Active Problem List    Diagnosis Date Noted    Diabetic foot ulcer with osteomyelitis ([facility]) 01/23/2024    At risk for venous thromboembolism (VTE) 02/09/2024    At high risk for skin breakdown 02/09/2024    Wound of skin 02/09/2024    Impaired  mobility and activities of daily living 02/09/2024    Acute pain due to injury 02/09/2024    Hematoma 02/06/2024    Anemia 02/06/2024    Constipation 01/26/2024    Preoperative cardiovascular examination 01/25/2024    Ankle ulcer, right, with fat layer exposed (HCC) 12/14/2023    Age-related osteoporosis without current pathological fracture 11/28/2023    Abnormal CT of the chest 10/17/2023    COPD, severity to be determined ([facility]) 10/17/2023    Tobacco use disorder 10/17/2023    PAD (peripheral artery disease) ([facility]) 10/10/2023    Bladder neoplasm 09/11/2023    Left renal mass 09/11/2023    Lactic acidosis 08/11/2023    Hypomagnesemia 08/11/2023    Degenerative lumbar disc 05/24/2023    Urinary incontinence 04/13/2023    GERD without esophagitis 10/16/2019    Medicare annual wellness visit, subsequent 09/23/2019    Essential hypertension 02/15/2019    Anxiety and depression 02/15/2019    Type 2 diabetes mellitus with diabetic polyneuropathy ([facility]) 02/12/2019          Rigo Hoover, DO  Physical Medicine and Rehabilitation  [facility]    I have spent a total time of 35 minutes on 02/12/24 in caring for this patient including Counseling / Coordination of care, Documenting in the medical record, and Communicating with other healthcare professionals .      ** Please Note:  voice to text software may have been used in the creation of this document. Although proof errors in transcription or interpretation are a potential of such software**

## 2024-02-12 NOTE — PROGRESS NOTES
02/12/24 0800   Pain Assessment   Pain Assessment Tool 0-10   Pain Score 7   Pain Location/Orientation Orientation: Right;Location: Shoulder   Hospital Pain Intervention(s) Rest   Restrictions/Precautions   Precautions Fall Risk;Bed/chair alarms;Pain;Hard of hearing;Supervision on toilet/commode;Impulsive;Cognitive   Weight Bearing Restrictions Yes   RLE Weight Bearing Per Order NWB   ROM Restrictions No   Braces or Orthoses Other (Comment)  (R prevalon boot)   Cognition   Overall Cognitive Status Unable to assess   Arousal/Participation Alert;Cooperative   Attention Attends with cues to redirect   Subjective   Subjective Pt in W/C participating in ADL w/ rehab aide to prepare for the day, agreeable to PT. PT req to intervene d/t pt not maintaining NWB on RLE.   Bed-Chair Transfer   Type of Assistance Needed Verbal cues;Physical assistance   Physical Assistance Level 25% or less   Comment MinAx1 Sit pivot from W/C <> PT mat, VC's to maintain NWB. W/ practice pt progressed to CG   Chair/Bed-to-Chair Transfer CARE Score 3   Transfer Bed/Chair/Wheelchair   Findings Practiced sit pivot transfers this date going to pt R side w/ R arm-rest removed   Walk 50 Feet with Two Turns   Reason if not Attempted Safety concerns   Walk 50 Feet with Two Turns CARE Score 88   Walk 150 Feet   Reason if not Attempted Safety concerns   Walk 150 Feet CARE Score 88   Walking 10 Feet on Uneven Surfaces   Reason if not Attempted Safety concerns   Walking 10 Feet on Uneven Surfaces CARE Score 88   Ambulation   Primary Mode of Locomotion Prior to Admission Walk   Distance Walked (feet) 7 ft   Assist Device Roller Walker   Gait Pattern Hopping   Limitations Noted In Balance;Coordination;Device Management;Endurance   Provided Assistance with: Balance;Trunk Support   Walk Assist Level Chair Follow;Moderate Assist   Findings Pt hopped roughly 7ft w/ RW and ModAx1 from PT. Limitations in endurance d/t LLE fatigue noted, as pt fatigued unable to  "maintain NWB on RLE   Does the patient walk? 2. Yes   Wheel 50 Feet with Two Turns   Type of Assistance Needed Verbal cues;Incidental touching   Physical Assistance Level No physical assistance   Comment VC's for direction   Wheel 50 Feet with Two Turns CARE Score 4   Wheelchair mobility   Does the patient use a wheelchair? 1. Yes   Type of Wheelchair Used 1. Manual   Method Right upper extremity;Left upper extremity;Left lower extremity   Assistance Provided For Locking Brakes;Remove Leg Rest;Replace Leg Rest   Distance Level Surface (feet) 120 ft  (+ 30)   Findings Pt unable to wheel to PT gym d/t fatigue/pain in RUE (chronic after fx). Pt proficient in locking/unlocking breaks, plan to incorproate W/C education throughout future sessions   Curb or Single Stair   Reason if not Attempted Safety concerns   1 Step (Curb) CARE Score 88   4 Steps   Reason if not Attempted Safety concerns   4 Steps CARE Score 88   12 Steps   Reason if not Attempted Activity not applicable   12 Steps CARE Score 9   Propulsion 1   Propulsion Type 1 Manual   Level 1 Level tile   Method 1 Right upper extremity;Left upper extremity   Level of Assistance 1 Contact guard   Therapeutic Interventions   Strengthening 20x LAQ seated in W/C   Assessment   Treatment Assessment Pt participated in 60 minute skilled PT session this date focused on gathering further information regarding PLOF, W/C mobility and safe transfers maintaining NWB status. PT trialed hopping w/ RW this date however pt unable to maintain NWB status. 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 She states that she does not provide physical assistance however she was the primary  in the family. Pt states while she in [employer],  and daughter are \"looking after each other\". Plan to inquire about home set up, asked pt if daughter Rozina could take photos to which pt responded she " doesn't know her next day off from work. Plan to inquire from pt this afternoon if PT should have Rozina as primary contact for info regarding her home. At end of session, pt was in OT gym ready for her session.   Problem List Decreased strength;Decreased endurance;Impaired balance;Decreased mobility;Decreased safety awareness;Impaired hearing;Orthopedic restrictions;Pain   Barriers to Discharge Inaccessible home environment;Decreased caregiver support  (Wound VAC, NWB status)   PT Barriers   Physical Impairment Decreased strength;Decreased range of motion;Decreased endurance;Impaired balance;Decreased mobility;Decreased coordination;Decreased cognition;Impaired judgement;Decreased safety awareness;Pain;Orthopedic restrictions   Functional Limitation Car transfers;Ramp negotiation;Stair negotiation;Standing;Transfers;Walking;Wheelchair management   Plan   Treatment/Interventions Functional transfer training;ADL retraining;LE strengthening/ROM;Therapeutic exercise;Endurance training;Bed mobility;Gait training;Compensatory technique education;Equipment eval/education;Patient/family training   Progress Progressing toward goals   PT Therapy Minutes   PT Time In 0800   PT Time Out 0900   PT Total Time (minutes) 60   PT Mode of treatment - Individual (minutes) 60   PT Mode of treatment - Concurrent (minutes) 0   PT Mode of treatment - Group (minutes) 0   PT Mode of treatment - Co-treat (minutes) 0   PT Mode of Treatment - Total time(minutes) 60 minutes   PT Cumulative Minutes 150

## 2024-02-12 NOTE — CASE MANAGEMENT
Clinical update faxed to klaudia at [facility] via [employer] to [phone]. Awaiting determination.

## 2024-02-12 NOTE — WOUND OSTOMY CARE
Consult Note - Wound   Lona Cedeno 78 y.o. female MRN: [id number]  Unit/Bed#: [facility] 964-01 Encounter: [id number]        History and Present Illness:  Patient is a 77 yo female that was admitted to [facility] for PT/OT s/p diabetic foot ulcer with osteomyelitis.  Patient has a PMH of COPD, DM type 2, HTN, tobacco abuse, mood disorder, PAD and bladder neoplasm. Patient is a mod assist for turning and repositioning. Patient reports continence of bowel and bladder. On assessment, patient is seen sitting OOB in WC with Right foot elevated in offloading boot.      Wound Care was consulted for right groin, reddened sacrum and left heel     Assessment Findings:   Podiatry following and managed right lower extremity/foot. Treatment to area per their recommendations. Left heel and b/l sacro-buttocks are pink in color and tisha- recommend preventative silicone bordered foam dressings to areas.   - Left Heel    - B/L sacro-buttocks       Right Groin Wound: patient reports wound is from previous surgery/incision site. Irregular in shape area of at least partial thickness skin loss. Wound bed is mix of beefy red/ light purple in color tissue. Ani-wound is fragile and hyperpigmented. Small yellow/serosanguineous drainage noted. Recommend Melgisorb ag and silicone bordered foam dressing.        No induration, fluctuance, odor, warmth/temperature differences, redness, or purulence noted to the above noted wounds and skin areas assessed. New dressings applied per orders listed below. Patient tolerated well- no s/s of non-verbal pain or discomfort observed during the encounter. Bedside nurse aware of plan of care. See flow sheets for more detailed assessment findings.      Orders listed below and wound care will continue to follow, call or tiger text with questions.     Skin Care Plan:  1-Right Groin Wound: Cleanse with NSS and pat dry. Apply Melgisorb AG to wound bed and cover with a small 4x4 silicone bordered foam dressing. Gibson  with T for Treatment and change daily or PRN soilage/displacement.   2-Turn/reposition q2h or when medically stable for pressure re-distribution on skin .  3-Elevate heels to offload pressure.  4-Moisturize skin daily with skin nourishing cream  5-Ehob cushion in chair when out of bed.  6-RLE and Right Foot: per podiatry recommendations.   7-Cleanse Sacro-Buttocks and Left Heel with soap and water. Pat dry. Apply Silicone Border Foam (Mepilex) to areas. Gibson with P for Prevention and change every 3 days or PRN soilage/displacement. Peel back and inspect Q-shift.      Wounds:  Wound 02/12/24 Thigh Anterior;Proximal;Right (Active)   Wound Image   02/12/24 1601   Wound Description Beefy red;Light purple 02/12/24 1601   Ani-wound Assessment Hyperpigmented 02/12/24 1601   Wound Length (cm) 1 cm 02/12/24 1601   Wound Width (cm) 2 cm 02/12/24 1601   Wound Depth (cm) 0.1 cm 02/12/24 1601   Wound Surface Area (cm^2) 2 cm^2 02/12/24 1601   Wound Volume (cm^3) 0.2 cm^3 02/12/24 1601   Calculated Wound Volume (cm^3) 0.2 cm^3 02/12/24 1601   Drainage Amount Small 02/12/24 1601   Drainage Description Yellow;Serosanguineous 02/12/24 1601   Non-staged Wound Description Partial thickness 02/12/24 1601   Treatments Cleansed;Irrigation with NSS;Site care 02/12/24 1601   Dressing Calcium Alginate with Silver 02/12/24 1601   Dressing Changed New 02/12/24 1601   Patient Tolerance Tolerated well 02/12/24 1601   Dressing Status Clean;Dry;Intact 02/12/24 1601               Sandra Vo RN, BSN, CWOCN

## 2024-02-12 NOTE — PROGRESS NOTES
"   02/12/24 1101   Pain Assessment   Pain Assessment Tool 0-10   Pain Score 5   Pain Location/Orientation Orientation: Right;Location: Shoulder   Restrictions/Precautions   Precautions Bed/chair alarms;Fall Risk;Supervision on toilet/commode;Hard of hearing;Impulsive  (Wound VAC)   Weight Bearing Restrictions Yes   RLE Weight Bearing Per Order NWB   Braces or Orthoses   (R prevalon boot)   Subjective   Subjective \"Oh i haven't even gotten to rest there's been people been here nonstop\"   Sit to Lying   Type of Assistance Needed Incidental touching   Physical Assistance Level No physical assistance   Comment Seated EOM in PT gym to lying on wedge/pillows   Sit to Lying CARE Score 4   Lying to Sitting on Side of Bed   Type of Assistance Needed Incidental touching;Adaptive equipment   Physical Assistance Level No physical assistance   Comment Pt grabbing for linen cart to pull herself up, w/ cues able to push from mat   Lying to Sitting on Side of Bed CARE Score 4   Bed-Chair Transfer   Type of Assistance Needed Physical assistance   Physical Assistance Level 25% or less   Comment MinAx1, Sit pivot transfer from W/C <> PT mat   Chair/Bed-to-Chair Transfer CARE Score 3   Transfer Bed/Chair/Wheelchair   Findings Pt performed 3 sit pivot transfers this date w/ varying assist levels depending on pt fatigue. From W/C <> PT mat MinAx1, from W/C <> Commode ModAx1 to mange trunk d/t LLE fatigue   Walk 50 Feet with Two Turns   Reason if not Attempted Safety concerns   Walk 50 Feet with Two Turns CARE Score 88   Walk 150 Feet   Reason if not Attempted Safety concerns   Walk 150 Feet CARE Score 88   Walking 10 Feet on Uneven Surfaces   Reason if not Attempted Safety concerns   Walking 10 Feet on Uneven Surfaces CARE Score 88   Wheelchair mobility   Does the patient use a wheelchair? 1. Yes   Type of Wheelchair Used 1. Manual   Findings Pt able to self propel however was fatigued after morning therapies   Curb or Single Stair "   Reason if not Attempted Safety concerns   1 Step (Curb) CARE Score 88   4 Steps   Reason if not Attempted Safety concerns   4 Steps CARE Score 88   12 Steps   Reason if not Attempted Safety concerns   12 Steps CARE Score 88   Toilet Transfer   Type of Assistance Needed Adaptive equipment;Physical assistance   Physical Assistance Level Total assistance   Comment Sit pivot From W/C <> Bedside commode, ModAx1 to manage trunk and assist w/ clothing managment. Pt able to perform partial stand req 2nd person to maintain standing/NWB status.   Toilet Transfer CARE Score 1   Toilet Transfer   Surface Assessed Bedside Commode   Limitations Noted In Balance;Endurance;Sequencing;LE Strength   Adaptive Equipment Grab Bar   Findings Pt impulsive w/ urgency req bathroom. Poorly performed weight shifting to manage clothing and maintain NWB status. Will discuss toileting recommendatiosn w/ OT in future.   Therapeutic Interventions   Strengthening Supine mat program: quad sets 30x ea leg, glute sets 30x, ankle pumps 30x, over bolster: LAQ 30x, hip adduction w/ ball   Assessment   Treatment Assessment Pt participated in 60 minute skilled PT session focusing on generalized LE strengthening on mat. Pt was fatigued from prior therapies this morning but tolerated this session well. During end of session, pt did have to use restroom and was brought back to room to use commode. Pt req increased assistance (ModAx1) to transfer to commode d/t LLE fatigue and was unable to manage cothing req 2nd person to assist.. Pt would benefit from continued skilled PT to focus on increasing her tolerance to activity, improve her strength and facilitate PLOF. Upon leaving, pt was seated upright in standard W/C w/ brakes on, chair alarmed and all needs within reach.   Problem List Decreased strength;Decreased endurance;Impaired balance;Decreased mobility;Decreased cognition;Decreased safety awareness;Pain;Orthopedic restrictions   Barriers to Discharge  Inaccessible home environment;Decreased caregiver support   PT Barriers   Physical Impairment Decreased strength;Decreased range of motion;Decreased endurance;Impaired balance;Decreased mobility;Decreased coordination;Decreased cognition;Impaired judgement;Decreased safety awareness;Pain;Orthopedic restrictions   Functional Limitation Car transfers;Ramp negotiation;Stair negotiation;Standing;Transfers;Walking;Wheelchair management   Plan   Treatment/Interventions Functional transfer training;LE strengthening/ROM;Therapeutic exercise;Patient/family training;Bed mobility;Gait training;Endurance training;Equipment eval/education   Progress Progressing toward goals   PT Therapy Minutes   PT Time In 1100   PT Time Out 1200   PT Total Time (minutes) 60   PT Mode of treatment - Individual (minutes) 60   PT Mode of treatment - Concurrent (minutes) 0   PT Mode of treatment - Group (minutes) 0   PT Mode of treatment - Co-treat (minutes) 0   PT Mode of Treatment - Total time(minutes) 60 minutes   PT Cumulative Minutes 210

## 2024-02-12 NOTE — PLAN OF CARE
Problem: Prexisting or High Potential for Compromised Skin Integrity  Goal: Skin integrity is maintained or improved  Description: INTERVENTIONS:  - Identify patients at risk for skin breakdown  - Assess and monitor skin integrity  - Assess and monitor nutrition and hydration status  - Monitor labs   - Assess for incontinence   - Turn and reposition patient  - Assist with mobility/ambulation  - Relieve pressure over bony prominences  - Avoid friction and shearing  - Provide appropriate hygiene as needed including keeping skin clean and dry  - Evaluate need for skin moisturizer/barrier cream  - Collaborate with interdisciplinary team   - Patient/family teaching  - Consider wound care consult   2/12/2024 1638 by Sudha Ceballos RN  Outcome: Progressing  2/12/2024 1638 by Sudha Ceballos RN  Outcome: Progressing     Problem: PAIN - ADULT  Goal: Verbalizes/displays adequate comfort level or baseline comfort level  Description: Interventions:  - Encourage patient to monitor pain and request assistance  - Assess pain using appropriate pain scale  - Administer analgesics based on type and severity of pain and evaluate response  - Implement non-pharmacological measures as appropriate and evaluate response  - Consider cultural and social influences on pain and pain management  - Notify physician/advanced practitioner if interventions unsuccessful or patient reports new pain  2/12/2024 1638 by Sudha Ceballos RN  Outcome: Progressing  2/12/2024 1638 by Sudha Ceballos RN  Outcome: Progressing     Problem: INFECTION - ADULT  Goal: Absence or prevention of progression during hospitalization  Description: INTERVENTIONS:  - Assess and monitor for signs and symptoms of infection  - Monitor lab/diagnostic results  - Monitor all insertion sites, i.e. indwelling lines, tubes, and drains  - Monitor endotracheal if appropriate and nasal secretions for changes in amount and color  - Powell appropriate  cooling/warming therapies per order  - Administer medications as ordered  - Instruct and encourage patient and family to use good hand hygiene technique  - Identify and instruct in appropriate isolation precautions for identified infection/condition  Outcome: Progressing     Problem: SAFETY ADULT  Goal: Patient will remain free of falls  Description: INTERVENTIONS:  - Educate patient/family on patient safety including physical limitations  - Instruct patient to call for assistance with activity   - Consult OT/PT to assist with strengthening/mobility   - Keep Call bell within reach  - Keep bed low and locked with side rails adjusted as appropriate  - Keep care items and personal belongings within reach  - Initiate and maintain comfort rounds  - Make Fall Risk Sign visible to staff  - Offer Toileting every 2 Hours, in advance of need  - Initiate/Maintain bed/chair alarm  - Obtain necessary fall risk management equipment: nonskid footwear  - Apply yellow socks and bracelet for high fall risk patients  - Consider moving patient to room near nurses station  Outcome: Progressing     Problem: DISCHARGE PLANNING  Goal: Discharge to home or other facility with appropriate resources  Description: INTERVENTIONS:  - Identify barriers to discharge w/patient and caregiver  - Arrange for needed discharge resources and transportation as appropriate  - Identify discharge learning needs (meds, wound care, etc.)  - Arrange for interpretive services to assist at discharge as needed  - Refer to Case Management Department for coordinating discharge planning if the patient needs post-hospital services based on physician/advanced practitioner order or complex needs related to functional status, cognitive ability, or social support system  Outcome: Progressing

## 2024-02-12 NOTE — ASSESSMENT & PLAN NOTE
Lab Results   Component Value Date    HGBA1C 6.5 (H) 11/07/2023       Recent Labs     02/12/24  0611 02/12/24  1047 02/12/24  1724 02/12/24 2127   POCGLU 153* 125 139 135       Management per internal medicine  Continue to monitor and provide education however relatively controlled

## 2024-02-12 NOTE — CASE MANAGEMENT
Met w/pt and reviewed rehab routine and cm role. Pt resides with spouse and her daughter Arlene, who is developmentally delayed. Pt states her  does not require assist in the home but he does not drive. Arlene is able to do anything asked of her like laundry and filling the pellet stove but she is not able to drive. Pts home [place] but she has approx 5 grace and this is currently pts biggest barrier. Pt has had hhc in 2008 after falling and breaking her leg but she doesn't recall the name of the agency. Pt is in agreement with [facility] on dc if needed. Pt with a VAC and at this time will most likely require on dc. Pt uses [facility] in [place] for rx services and confirms she has an rx plan. Pt has a roller walker, spc and commode. Cm reviewed team mtg process and potential los of two weeks based on current functional ability. Cm following to secure VAC for dc and arranged hhc services if that is to be the plan.

## 2024-02-12 NOTE — ASSESSMENT & PLAN NOTE
S/p right foot wound/achilles debridement with foot/toe washout; partial amputation distal right 2nd toe; wound VAC 2/4/24  Podiatry to follow  Continue VAC dressing.  Follow-up outpt for potential split thickness skin graft.  Observe off abx.

## 2024-02-12 NOTE — PROGRESS NOTES
[facility]  Progress Note  Name: Lona Cedeno I  MRN: [id number]  Unit/Bed#: -01 I Date of Admission: 2/9/2024   Date of Service: 2/12/2024 I Hospital Day: 3    Assessment/Plan   Hematoma  Assessment & Plan  S/p I&D hematoma/seroma of right axillary dissection operative site 2/4/24  Will watch site  Had reaccumulated after having it drained = stable    * Diabetic foot ulcer with osteomyelitis (HCC)  Assessment & Plan  S/p right foot wound/achilles debridement with foot/toe washout; partial amputation distal right 2nd toe; wound VAC 2/4/24  Podiatry to follow  Continue VAC dressing.  Follow-up outpt for potential split thickness skin graft.  Observe off abx.      Essential hypertension  Assessment & Plan  Home: Amiloride 5mg qd/Lisinopril 5mg qd  Here: Amiloride 5mg qd  BP stable.    Type 2 diabetes mellitus with diabetic polyneuropathy (HCC)  Assessment & Plan  Lab Results   Component Value Date    HGBA1C 6.5 (H) 11/07/2023       Recent Labs     02/11/24  1041 02/11/24  1529 02/11/24  2041 02/12/24  0611   POCGLU 122 184* 127 153*       Home: Januvia 100mg qd/Metformin 1000mg qd  Here: Metformin 500mg BID  Continue QID Accuchecks/SSI and DM diet  Blood sugars improving.    PAD (peripheral artery disease) ([facility])  Assessment & Plan  S/p right axillary to femoral BPG 1/31/24  Continue ASA, Xarelto and atorvastatin.  Encourage tobacco cessation.  Price check on Xarelto = $20.00/mo    Anxiety and depression  Assessment & Plan  Continue home medications Klonopin and Wellbutrin    Anemia  Assessment & Plan  Hgb stable    Tobacco use disorder  Assessment & Plan  Continue Nicotine patch.  Cessation encouraged.    COPD, severity to be determined ([facility])  Assessment & Plan  Continue Breo and albuterol.  Pt uses Advair 250/50 and albuterol at home.  Respiratory status stable             The above assessment and plan was reviewed and updated as determined by my evaluation of the  patient on 2/12/2024.    Labs:   Results from last 7 days   Lab Units 02/09/24  0540 02/07/24  0935   WBC Thousand/uL 9.90 10.39*   HEMOGLOBIN g/dL 10.3* 10.2*   HEMATOCRIT % 32.3* 32.0*   PLATELETS Thousands/uL 367 344     Results from last 7 days   Lab Units 02/09/24  0540 02/07/24  0935   SODIUM mmol/L 138 137   POTASSIUM mmol/L 4.4 4.4   CHLORIDE mmol/L 100 101   CO2 mmol/L 30 31   BUN mg/dL 14 16   CREATININE mg/dL 0.59* 0.62   CALCIUM mg/dL 10.0 9.4             Results from last 7 days   Lab Units 02/12/24  1047 02/12/24  0611 02/11/24  2041   POC GLUCOSE mg/dl 125 153* 127       Imaging  No orders to display       Review of Scheduled Meds:  Current Facility-Administered Medications   Medication Dose Route Frequency Provider Last Rate    acetaminophen  975 mg Oral Q8H JILLIAN Denisse Berger PA-C      albuterol  2 puff Inhalation Q6H PRN Denisse Berger PA-C      AMILoride  5 mg Oral Daily Denisse Berger PA-C      aspirin  81 mg Oral Daily Denisse Berger PA-C      atorvastatin  10 mg Oral Daily With Dinner Denisse Berger PA-C      bisacodyl  10 mg Rectal Daily Denisse Berger PA-C      buPROPion  300 mg Oral Daily Denisse Berger PA-C      clonazePAM  1 mg Oral QPM Denisse Berger PA-C      docusate sodium  100 mg Oral BID Denisse Berger PA-C      fish oil  1,000 mg Oral Daily Denisse Berger PA-C      fluticasone-vilanterol  1 puff Inhalation Daily Denisse Berger PA-C      insulin lispro  1-5 Units Subcutaneous TID AC Denisse Berger PA-C      insulin lispro  1-5 Units Subcutaneous HS Denisse Berger PA-C      metFORMIN  500 mg Oral BID With Meals Denisse Berger PA-C      methocarbamol  250 mg Oral Q8H JILLIAN Denisse Berger PA-C      nicotine  1 patch Transdermal Daily Denisse Berger PA-C      ondansetron  4 mg Oral Q6H PRN Denisse Berger PA-C      oxybutynin  5 mg Oral Daily Denisse Berger PA-C      oxyCODONE  2.5 mg  Oral Q4H PRN Denisse Berger PA-C      Or    oxyCODONE  5 mg Oral Q4H PRN Denisse Berger PA-C      pantoprazole  40 mg Oral Early Morning Denisse Berger PA-C      polyethylene glycol  17 g Oral Daily Denisse Berger PA-C      pregabalin  200 mg Oral TID Denisse Berger PA-C      rivaroxaban  2.5 mg Oral BID With Meals Denisse Berger PA-C      senna  2 tablet Oral HS Denisse Berger PA-C         Subjective/ HPI: Patient seen and examined. Patients overnight issues or events were reviewed with nursing staff. New or overnight issues include the following:     No new or overnight issues.  Offers no complaints    ROS:   A 10 point ROS was performed; negative except as noted above.        *Labs /Radiology studies Reviewed  *Medications  reviewed and reconciled as needed  *Please refer to order section for additional ordered labs studies      Physical Examination:  Vitals:   Vitals:    02/11/24 0551 02/11/24 1302 02/11/24 2004 02/12/24 0604   BP: 121/55 114/57 124/60 159/68   BP Location: Left arm Right arm Left arm Left arm   Pulse: 74 70 77 80   Resp: 16 18 18 18   Temp: 97.9 °F (36.6 °C) 98 °F (36.7 °C) 98.1 °F (36.7 °C) 98.2 °F (36.8 °C)   TempSrc: Oral Oral Oral Oral   SpO2: 92% 94% 93% 94%   Weight:       Height:           General Appearance: no distress, non toxic appearing  HEENT: PERRLA, conjuctiva normal; oropharynx clear; mucous membranes moist   Neck:  Supple, normal ROM  Lungs: CTA, normal respiratory effort, no retractions, expiratory effort normal.  Right chest hematoma present but acc to PA from over the weekend that saw her = she saw her today to look at the hematoma and it is the same  CV: regular rate and rhythm; no rubs/murmurs/gallops, PMI normal   ABD: soft; ND/NT; +BS  EXT: no edema  Skin: normal turgor, normal texture, no rashes  Psych: affect normal, mood normal  Neuro: AAO           The above physical exam was reviewed and updated as determined by my  evaluation of the patient on 2/12/2024.    Invasive Devices       Drain  Duration             Ureteral Internal Stent Left ureter 6 Fr. 84 days                       VTE Pharmacologic Prophylaxis: Xarelto  Code Status: Level 1 - Full Code  Current Length of Stay: 3 day(s)    Total floor / unit time spent today 30 minutes  Coordination of patient's care was performed in conjunction with primary service. Time invested included review of patient's labs, vitals, and management of their comorbidities with continued monitoring, examination of patient as well as answering patient questions, documenting her findings and creating progress note in electronic medical record,  ordering appropriate diagnostic testing.       ** Please Note:  voice to text software may have been used in the creation of this document. Although proof errors in transcription or interpretation are a potential of such software**

## 2024-02-12 NOTE — ASSESSMENT & PLAN NOTE
Lab Results   Component Value Date    HGBA1C 6.5 (H) 11/07/2023       Recent Labs     02/11/24  1041 02/11/24  1529 02/11/24  2041 02/12/24  0611   POCGLU 122 184* 127 153*       Home: Januvia 100mg qd/Metformin 1000mg qd  Here: Metformin 500mg BID  Continue QID Accuchecks/SSI and DM diet  Blood sugars improving.

## 2024-02-12 NOTE — UTILIZATION REVIEW
NOTIFICATION OF ADMISSION DISCHARGE   This is a Notification of Discharge from [facility]. Please be advised that this patient has been discharge from our facility. Below you will find the admission and discharge date and time including the patient’s disposition.   UTILIZATION REVIEW CONTACT:  Mehdi Morrison  Utilization   Network Utilization Review Department  Phone: [phone] x carefully listen to the prompts. All voicemails are confidential.  Email: [email or web address]     ADMISSION INFORMATION  PRESENTATION DATE: 1/26/2024  8:47 PM  OBERVATION ADMISSION DATE:   INPATIENT ADMISSION DATE: 1/26/24  8:47 PM   DISCHARGE DATE: 2/9/2024  2:14 PM   DISPOSITION:[facility]    Network Utilization Review Department  ATTENTION: Please call with any questions or concerns to [phone] and carefully listen to the prompts so that you are directed to the right person. All voicemails are confidential.   For Discharge needs, contact Care Management DC Support Team at [phone] opt. [phone]  Send all requests for admission clinical reviews, approved or denied determinations and any other requests to dedicated fax number below belonging to the campus where the patient is receiving treatment. List of dedicated fax numbers for the Facilities:  FACILITY NAME UR FAX NUMBER   ADMISSION DENIALS (Administrative/Medical Necessity) [phone]   DISCHARGE SUPPORT TEAM ([facility]) [phone]   PARENT CHILD HEALTH (Maternity/NICU/Pediatrics) [phone]   [facility] [phone]   [facility] [phone]   [facility] [phone]   [facility] [phone]   [facility] [phone]   [facility] [phone]   [facility] [phone]   [facility] [phone]   [facility]  [facility] [phone]   [facility] [phone]   [facility] [phone]   [facility] [phone]

## 2024-02-12 NOTE — DISCHARGE INSTR - OTHER ORDERS
Skin Care Plan:  1-Right Groin Wound: Cleanse with NSS and pat dry. Apply Melgisorb AG to wound bed and cover with a small 4x4 silicone bordered foam dressing. Gibson with T for Treatment and change daily or PRN soilage/displacement.   2-Turn/reposition q2h or when medically stable for pressure re-distribution on skin .  3-Elevate heels to offload pressure.  4-Moisturize skin daily with skin nourishing cream  5-Ehob cushion in chair when out of bed.  6-RLE and Right Foot: per podiatry recommendations.   7-Cleanse Sacro-Buttocks and Left Heel with soap and water. Pat dry. Apply Silicone Border Foam (Mepilex) to areas. Gibson with P for Prevention and change every 3 days or PRN soilage/displacement. Peel back and inspect Q-shift.

## 2024-02-12 NOTE — PROGRESS NOTES
"   02/12/24 1400   Pain Assessment   Pain Assessment Tool 0-10   Pain Score No Pain   Restrictions/Precautions   Precautions Bed/chair alarms;Cognitive;Fall Risk;Supervision on toilet/commode;Hard of hearing;Impulsive  (RLE wound vac)   Weight Bearing Restrictions Yes   RLE Weight Bearing Per Order NWB   Braces or Orthoses   (R prevalon boot)   Lifestyle   Autonomy \"I don't have pain anymore since that nurse gave me medicine\"   Toileting Hygiene   Type of Assistance Needed Physical assistance;Adaptive equipment;Set-up / clean-up;Verbal cues   Physical Assistance Level Total assistance   Comment Would anticipate Ax2 without AD/AE to simulate baseline. While seated on platform drop arm BSC, pt requires CGA for az hygiene and min A for CM down hips while laterally shifting. Patient unable to laterally weight shift for CM back over hips seated despite cuing, requiring instruction for partial push up from BSC while OT completes rest of CM mod A. Patient noncompliant requiring cuing and assistance at pt attempts to fully stand with WBing through RLE. Patient would benefit from continued practice.   Toileting Hygiene CARE Score 1   Toilet Transfer   Type of Assistance Needed Adaptive equipment;Physical assistance   Physical Assistance Level Total assistance   Comment Would anticipate Ax2 without AD/AE to simulate baseline. Switched out regular drop arm commode with platform drop arm bedside commode because PT reports pt with difficulty earlier in day. Min A sit pivot w/c <-> platform drop arm bedside commode.   Toilet Transfer CARE Score 1   Cognition   Overall Cognitive Status Impaired   Arousal/Participation Alert;Cooperative   Attention Attends with cues to redirect   Orientation Level Oriented X4   Memory Decreased recall of recent events;Decreased short term memory   Following Commands Follows one step commands with increased time or repetition   Comments More difficulty with following RLE NWB during toileting - pt " attempts to stand for CM   Activity Tolerance   Activity Tolerance Patient tolerated treatment well   Assessment   Treatment Assessment Patient engaged in 30 min treatment session with focus on toilet transfer utilizing  drop arm platform bedside commode and toileting while seated and lateral weight shifting. Therapist took time to educate pt on difference between standard drop arm bedside commode vs. Drop arm platform BSC. Patient benefits from drop arm platform BSC so pt can remain seated and laterally weight shift to help pt maintain RLE NWB precautions and reduce risk of falling. CLOF pt requires min A for sit pivot w/c <-> drop arm PFBSC. For toileting, pt continues to require overall mod A with difficulty weight shifting for CM back over hips - requiring continued education and practice to follow precautions. See above for treatment details. Patient would benefit from upcoming scheduled ADL to be completed EOB as pt exhibits G sitting balance during LB dressing tasks in earlier session and continued trial/assessment of LBAE for carryover of today's earlier session. Continue OT plan of care with focus on ADL training specifically toileting, functional transfers, sitting balance/tolerance activity, IADLs at w/c level, w/c mgmt and mobility, strengthening, and overall activity tolerance.   Prognosis Fair   Problem List Decreased strength;Decreased endurance;Impaired balance;Decreased mobility;Decreased cognition;Decreased safety awareness;Orthopedic restrictions   Barriers to Discharge Decreased caregiver support;Inaccessible home environment   Plan   Treatment/Interventions ADL retraining;Functional transfer training;Therapeutic exercise;Endurance training;Cognitive reorientation;Patient/family training;Equipment eval/education;Compensatory technique education   Progress Progressing toward goals   Discharge Recommendation   Rehab Resource Intensity Level, OT   (home OT)   OT Therapy Minutes   OT Time In 1400    OT Time Out 1430   OT Total Time (minutes) 30   OT Mode of treatment - Individual (minutes) 30   OT Mode of treatment - Concurrent (minutes) 0   OT Mode of treatment - Group (minutes) 0   OT Mode of treatment - Co-treat (minutes) 0   OT Mode of Treatment - Total time(minutes) 30 minutes   OT Cumulative Minutes 270   Therapy Time missed   Time missed? No

## 2024-02-13 ENCOUNTER — APPOINTMENT (INPATIENT)
Dept: RADIOLOGY | Facility: HOSPITAL | Age: 79
DRG: 559 | End: 2024-02-13
Payer: COMMERCIAL

## 2024-02-13 ENCOUNTER — HOSPITAL ENCOUNTER (INPATIENT)
Facility: HOSPITAL | Age: 79
LOS: 10 days | DRG: 856 | End: 2024-02-23
Attending: INTERNAL MEDICINE | Admitting: INTERNAL MEDICINE
Payer: COMMERCIAL

## 2024-02-13 VITALS
HEART RATE: 95 BPM | RESPIRATION RATE: 18 BRPM | WEIGHT: 153.22 LBS | BODY MASS INDEX: 28.93 KG/M2 | TEMPERATURE: 102.8 F | OXYGEN SATURATION: 96 % | HEIGHT: 61 IN | SYSTOLIC BLOOD PRESSURE: 127 MMHG | DIASTOLIC BLOOD PRESSURE: 58 MMHG

## 2024-02-13 DIAGNOSIS — L97.312 ANKLE ULCER, RIGHT, WITH FAT LAYER EXPOSED (HCC): ICD-10-CM

## 2024-02-13 DIAGNOSIS — K59.01 SLOW TRANSIT CONSTIPATION: ICD-10-CM

## 2024-02-13 DIAGNOSIS — T14.8XXA WOUND OF SKIN: ICD-10-CM

## 2024-02-13 DIAGNOSIS — T81.49XA SURGICAL SITE INFECTION: Primary | ICD-10-CM

## 2024-02-13 DIAGNOSIS — D64.9 ANEMIA: ICD-10-CM

## 2024-02-13 PROBLEM — A41.9 SEPSIS WITHOUT ACUTE ORGAN DYSFUNCTION (HCC): Status: ACTIVE | Noted: 2024-02-13

## 2024-02-13 PROBLEM — D72.829 LEUKOCYTOSIS: Status: ACTIVE | Noted: 2024-02-13

## 2024-02-13 LAB
ANION GAP SERPL CALCULATED.3IONS-SCNC: 6 MMOL/L
BASOPHILS # BLD AUTO: 0.03 THOUSANDS/ÂΜL (ref 0–0.1)
BASOPHILS NFR BLD AUTO: 0 % (ref 0–1)
BUN SERPL-MCNC: 25 MG/DL (ref 5–25)
CALCIUM SERPL-MCNC: 9.5 MG/DL (ref 8.4–10.2)
CHLORIDE SERPL-SCNC: 101 MMOL/L (ref 96–108)
CO2 SERPL-SCNC: 30 MMOL/L (ref 21–32)
CREAT SERPL-MCNC: 0.59 MG/DL (ref 0.6–1.3)
EOSINOPHIL # BLD AUTO: 0.68 THOUSAND/ÂΜL (ref 0–0.61)
EOSINOPHIL NFR BLD AUTO: 5 % (ref 0–6)
ERYTHROCYTE [DISTWIDTH] IN BLOOD BY AUTOMATED COUNT: 14.8 % (ref 11.6–15.1)
FLUAV RNA RESP QL NAA+PROBE: NEGATIVE
FLUBV RNA RESP QL NAA+PROBE: NEGATIVE
GFR SERPL CREATININE-BSD FRML MDRD: 88 ML/MIN/1.73SQ M
GLUCOSE SERPL-MCNC: 106 MG/DL (ref 65–140)
GLUCOSE SERPL-MCNC: 118 MG/DL (ref 65–140)
GLUCOSE SERPL-MCNC: 122 MG/DL (ref 65–140)
GLUCOSE SERPL-MCNC: 144 MG/DL (ref 65–140)
GLUCOSE SERPL-MCNC: 157 MG/DL (ref 65–140)
HCT VFR BLD AUTO: 32.8 % (ref 34.8–46.1)
HGB BLD-MCNC: 10.4 G/DL (ref 11.5–15.4)
IMM GRANULOCYTES # BLD AUTO: 0.05 THOUSAND/UL (ref 0–0.2)
IMM GRANULOCYTES NFR BLD AUTO: 0 % (ref 0–2)
LACTATE SERPL-SCNC: 1.7 MMOL/L (ref 0.5–2)
LACTATE SERPL-SCNC: 2.3 MMOL/L (ref 0.5–2)
LYMPHOCYTES # BLD AUTO: 1.77 THOUSANDS/ÂΜL (ref 0.6–4.47)
LYMPHOCYTES NFR BLD AUTO: 12 % (ref 14–44)
MCH RBC QN AUTO: 30.6 PG (ref 26.8–34.3)
MCHC RBC AUTO-ENTMCNC: 31.7 G/DL (ref 31.4–37.4)
MCV RBC AUTO: 97 FL (ref 82–98)
MONOCYTES # BLD AUTO: 0.98 THOUSAND/ÂΜL (ref 0.17–1.22)
MONOCYTES NFR BLD AUTO: 7 % (ref 4–12)
NEUTROPHILS # BLD AUTO: 11.32 THOUSANDS/ÂΜL (ref 1.85–7.62)
NEUTS SEG NFR BLD AUTO: 76 % (ref 43–75)
NRBC BLD AUTO-RTO: 0 /100 WBCS
PLATELET # BLD AUTO: 404 THOUSANDS/UL (ref 149–390)
PMV BLD AUTO: 9.1 FL (ref 8.9–12.7)
POTASSIUM SERPL-SCNC: 4.4 MMOL/L (ref 3.5–5.3)
PROCALCITONIN SERPL-MCNC: 0.06 NG/ML
RBC # BLD AUTO: 3.4 MILLION/UL (ref 3.81–5.12)
RSV RNA RESP QL NAA+PROBE: NEGATIVE
SARS-COV-2 RNA RESP QL NAA+PROBE: NEGATIVE
SODIUM SERPL-SCNC: 137 MMOL/L (ref 135–147)
WBC # BLD AUTO: 14.83 THOUSAND/UL (ref 4.31–10.16)

## 2024-02-13 PROCEDURE — 87086 URINE CULTURE/COLONY COUNT: CPT | Performed by: NURSE PRACTITIONER

## 2024-02-13 PROCEDURE — 87150 DNA/RNA AMPLIFIED PROBE: CPT | Performed by: NURSE PRACTITIONER

## 2024-02-13 PROCEDURE — 99223 1ST HOSP IP/OBS HIGH 75: CPT | Performed by: INTERNAL MEDICINE

## 2024-02-13 PROCEDURE — 97530 THERAPEUTIC ACTIVITIES: CPT

## 2024-02-13 PROCEDURE — 85025 COMPLETE CBC W/AUTO DIFF WBC: CPT | Performed by: NURSE PRACTITIONER

## 2024-02-13 PROCEDURE — 97535 SELF CARE MNGMENT TRAINING: CPT

## 2024-02-13 PROCEDURE — 99239 HOSP IP/OBS DSCHRG MGMT >30: CPT | Performed by: INTERNAL MEDICINE

## 2024-02-13 PROCEDURE — 99024 POSTOP FOLLOW-UP VISIT: CPT | Performed by: SURGERY

## 2024-02-13 PROCEDURE — 97110 THERAPEUTIC EXERCISES: CPT

## 2024-02-13 PROCEDURE — 84145 PROCALCITONIN (PCT): CPT | Performed by: NURSE PRACTITIONER

## 2024-02-13 PROCEDURE — 87040 BLOOD CULTURE FOR BACTERIA: CPT | Performed by: NURSE PRACTITIONER

## 2024-02-13 PROCEDURE — 82948 REAGENT STRIP/BLOOD GLUCOSE: CPT

## 2024-02-13 PROCEDURE — 80048 BASIC METABOLIC PNL TOTAL CA: CPT | Performed by: NURSE PRACTITIONER

## 2024-02-13 PROCEDURE — 0241U HB NFCT DS VIR RESP RNA 4 TRGT: CPT | Performed by: NURSE PRACTITIONER

## 2024-02-13 PROCEDURE — 83605 ASSAY OF LACTIC ACID: CPT | Performed by: NURSE PRACTITIONER

## 2024-02-13 PROCEDURE — 87186 SC STD MICRODIL/AGAR DIL: CPT | Performed by: NURSE PRACTITIONER

## 2024-02-13 PROCEDURE — 99232 SBSQ HOSP IP/OBS MODERATE 35: CPT | Performed by: STUDENT IN AN ORGANIZED HEALTH CARE EDUCATION/TRAINING PROGRAM

## 2024-02-13 PROCEDURE — 99232 SBSQ HOSP IP/OBS MODERATE 35: CPT | Performed by: INTERNAL MEDICINE

## 2024-02-13 PROCEDURE — 87077 CULTURE AEROBIC IDENTIFY: CPT | Performed by: NURSE PRACTITIONER

## 2024-02-13 RX ORDER — SODIUM CHLORIDE 9 MG/ML
75 INJECTION, SOLUTION INTRAVENOUS CONTINUOUS
Status: DISCONTINUED | OUTPATIENT
Start: 2024-02-13 | End: 2024-02-13 | Stop reason: HOSPADM

## 2024-02-13 RX ORDER — OXYBUTYNIN CHLORIDE 5 MG/1
5 TABLET, EXTENDED RELEASE ORAL DAILY
Status: DISCONTINUED | OUTPATIENT
Start: 2024-02-14 | End: 2024-02-23 | Stop reason: HOSPADM

## 2024-02-13 RX ORDER — ACETAMINOPHEN 325 MG/1
650 TABLET ORAL EVERY 6 HOURS PRN
Status: DISCONTINUED | OUTPATIENT
Start: 2024-02-13 | End: 2024-02-14

## 2024-02-13 RX ORDER — SODIUM CHLORIDE 9 MG/ML
75 INJECTION, SOLUTION INTRAVENOUS CONTINUOUS
Status: DISCONTINUED | OUTPATIENT
Start: 2024-02-13 | End: 2024-02-17

## 2024-02-13 RX ORDER — VANCOMYCIN HYDROCHLORIDE 1 G/200ML
15 INJECTION, SOLUTION INTRAVENOUS ONCE
Qty: 200 ML | Refills: 0 | Status: COMPLETED | OUTPATIENT
Start: 2024-02-13 | End: 2024-02-13

## 2024-02-13 RX ORDER — NICOTINE 21 MG/24HR
1 PATCH, TRANSDERMAL 24 HOURS TRANSDERMAL DAILY
Status: DISCONTINUED | OUTPATIENT
Start: 2024-02-14 | End: 2024-02-23 | Stop reason: HOSPADM

## 2024-02-13 RX ORDER — ACETAMINOPHEN 325 MG/1
975 TABLET ORAL EVERY 8 HOURS SCHEDULED
Status: DISCONTINUED | OUTPATIENT
Start: 2024-02-14 | End: 2024-02-23 | Stop reason: HOSPADM

## 2024-02-13 RX ORDER — INSULIN LISPRO 100 [IU]/ML
1-5 INJECTION, SOLUTION INTRAVENOUS; SUBCUTANEOUS EVERY 6 HOURS SCHEDULED
Status: DISCONTINUED | OUTPATIENT
Start: 2024-02-14 | End: 2024-02-14

## 2024-02-13 RX ORDER — ENOXAPARIN SODIUM 100 MG/ML
40 INJECTION SUBCUTANEOUS DAILY
Status: DISCONTINUED | OUTPATIENT
Start: 2024-02-15 | End: 2024-02-13

## 2024-02-13 RX ORDER — BUPROPION HYDROCHLORIDE 150 MG/1
300 TABLET ORAL DAILY
Status: DISCONTINUED | OUTPATIENT
Start: 2024-02-14 | End: 2024-02-23 | Stop reason: HOSPADM

## 2024-02-13 RX ORDER — ATORVASTATIN CALCIUM 10 MG/1
10 TABLET, FILM COATED ORAL
Status: DISCONTINUED | OUTPATIENT
Start: 2024-02-14 | End: 2024-02-23 | Stop reason: HOSPADM

## 2024-02-13 RX ORDER — ONDANSETRON 2 MG/ML
4 INJECTION INTRAMUSCULAR; INTRAVENOUS EVERY 6 HOURS PRN
Status: DISCONTINUED | OUTPATIENT
Start: 2024-02-13 | End: 2024-02-23 | Stop reason: HOSPADM

## 2024-02-13 RX ORDER — PREGABALIN 100 MG/1
200 CAPSULE ORAL 3 TIMES DAILY
Status: DISCONTINUED | OUTPATIENT
Start: 2024-02-14 | End: 2024-02-23 | Stop reason: HOSPADM

## 2024-02-13 RX ORDER — SODIUM CHLORIDE 9 MG/ML
75 INJECTION, SOLUTION INTRAVENOUS CONTINUOUS
Status: CANCELLED | OUTPATIENT
Start: 2024-02-13

## 2024-02-13 RX ORDER — ALBUTEROL SULFATE 90 UG/1
2 AEROSOL, METERED RESPIRATORY (INHALATION) EVERY 6 HOURS PRN
Status: DISCONTINUED | OUTPATIENT
Start: 2024-02-13 | End: 2024-02-23 | Stop reason: HOSPADM

## 2024-02-13 RX ORDER — METHOCARBAMOL 500 MG/1
250 TABLET, FILM COATED ORAL EVERY 8 HOURS SCHEDULED
Status: DISCONTINUED | OUTPATIENT
Start: 2024-02-14 | End: 2024-02-23 | Stop reason: HOSPADM

## 2024-02-13 RX ORDER — CHLORAL HYDRATE 500 MG
1000 CAPSULE ORAL DAILY
Status: DISCONTINUED | OUTPATIENT
Start: 2024-02-14 | End: 2024-02-23 | Stop reason: HOSPADM

## 2024-02-13 RX ORDER — DOCUSATE SODIUM 100 MG/1
100 CAPSULE, LIQUID FILLED ORAL 2 TIMES DAILY
Status: DISCONTINUED | OUTPATIENT
Start: 2024-02-14 | End: 2024-02-21

## 2024-02-13 RX ORDER — CLONAZEPAM 1 MG/1
1 TABLET ORAL EVERY EVENING
Status: DISCONTINUED | OUTPATIENT
Start: 2024-02-14 | End: 2024-02-23 | Stop reason: HOSPADM

## 2024-02-13 RX ORDER — SENNOSIDES 8.6 MG
2 TABLET ORAL DAILY PRN
Status: DISCONTINUED | OUTPATIENT
Start: 2024-02-13 | End: 2024-02-15

## 2024-02-13 RX ORDER — FLUTICASONE FUROATE AND VILANTEROL 200; 25 UG/1; UG/1
1 POWDER RESPIRATORY (INHALATION)
Status: DISCONTINUED | OUTPATIENT
Start: 2024-02-14 | End: 2024-02-23 | Stop reason: HOSPADM

## 2024-02-13 RX ORDER — INSULIN LISPRO 100 [IU]/ML
1-5 INJECTION, SOLUTION INTRAVENOUS; SUBCUTANEOUS
Status: CANCELLED | OUTPATIENT
Start: 2024-02-14

## 2024-02-13 RX ORDER — INSULIN LISPRO 100 [IU]/ML
1-5 INJECTION, SOLUTION INTRAVENOUS; SUBCUTANEOUS
Status: CANCELLED | OUTPATIENT
Start: 2024-02-13

## 2024-02-13 RX ORDER — OXYCODONE HYDROCHLORIDE 5 MG/1
5 TABLET ORAL EVERY 4 HOURS PRN
Status: DISCONTINUED | OUTPATIENT
Start: 2024-02-13 | End: 2024-02-23 | Stop reason: HOSPADM

## 2024-02-13 RX ORDER — VANCOMYCIN HYDROCHLORIDE 750 MG/150ML
750 INJECTION, SOLUTION INTRAVENOUS EVERY 12 HOURS
Status: DISCONTINUED | OUTPATIENT
Start: 2024-02-14 | End: 2024-02-15

## 2024-02-13 RX ORDER — PANTOPRAZOLE SODIUM 40 MG/1
40 TABLET, DELAYED RELEASE ORAL
Status: DISCONTINUED | OUTPATIENT
Start: 2024-02-14 | End: 2024-02-23 | Stop reason: HOSPADM

## 2024-02-13 RX ADMIN — METHOCARBAMOL 250 MG: 500 TABLET ORAL at 13:48

## 2024-02-13 RX ADMIN — PREGABALIN 200 MG: 100 CAPSULE ORAL at 10:00

## 2024-02-13 RX ADMIN — PREGABALIN 200 MG: 100 CAPSULE ORAL at 17:31

## 2024-02-13 RX ADMIN — ACETAMINOPHEN 975 MG: 325 TABLET, FILM COATED ORAL at 13:48

## 2024-02-13 RX ADMIN — DOCUSATE SODIUM 100 MG: 100 CAPSULE, LIQUID FILLED ORAL at 09:59

## 2024-02-13 RX ADMIN — METHOCARBAMOL 250 MG: 500 TABLET ORAL at 21:45

## 2024-02-13 RX ADMIN — OMEGA-3 FATTY ACIDS CAP 1000 MG 1000 MG: 1000 CAP at 10:03

## 2024-02-13 RX ADMIN — RIVAROXABAN 2.5 MG: 2.5 TABLET, FILM COATED ORAL at 07:16

## 2024-02-13 RX ADMIN — METFORMIN HYDROCHLORIDE 500 MG: 500 TABLET ORAL at 07:16

## 2024-02-13 RX ADMIN — ACETAMINOPHEN 975 MG: 325 TABLET, FILM COATED ORAL at 05:34

## 2024-02-13 RX ADMIN — SENNOSIDES 17.2 MG: 8.6 TABLET, FILM COATED ORAL at 21:45

## 2024-02-13 RX ADMIN — METFORMIN HYDROCHLORIDE 500 MG: 500 TABLET ORAL at 17:31

## 2024-02-13 RX ADMIN — NICOTINE 1 PATCH: 21 PATCH, EXTENDED RELEASE TRANSDERMAL at 10:00

## 2024-02-13 RX ADMIN — VANCOMYCIN HYDROCHLORIDE 1000 MG: 1 INJECTION, SOLUTION INTRAVENOUS at 22:30

## 2024-02-13 RX ADMIN — ACETAMINOPHEN 975 MG: 325 TABLET, FILM COATED ORAL at 20:23

## 2024-02-13 RX ADMIN — ASPIRIN 81 MG: 81 TABLET, COATED ORAL at 10:01

## 2024-02-13 RX ADMIN — CLONAZEPAM 1 MG: 1 TABLET ORAL at 17:31

## 2024-02-13 RX ADMIN — BUPROPION HYDROCHLORIDE 300 MG: 150 TABLET, EXTENDED RELEASE ORAL at 10:03

## 2024-02-13 RX ADMIN — RIVAROXABAN 2.5 MG: 2.5 TABLET, FILM COATED ORAL at 17:32

## 2024-02-13 RX ADMIN — PREGABALIN 200 MG: 100 CAPSULE ORAL at 21:45

## 2024-02-13 RX ADMIN — PANTOPRAZOLE SODIUM 40 MG: 40 TABLET, DELAYED RELEASE ORAL at 07:16

## 2024-02-13 RX ADMIN — SODIUM CHLORIDE 75 ML/HR: 0.9 INJECTION, SOLUTION INTRAVENOUS at 14:54

## 2024-02-13 RX ADMIN — FLUTICASONE FUROATE AND VILANTEROL TRIFENATATE 1 PUFF: 200; 25 POWDER RESPIRATORY (INHALATION) at 07:17

## 2024-02-13 RX ADMIN — AMILORIDE HYDROCLORIDE 5 MG: 5 TABLET ORAL at 10:03

## 2024-02-13 RX ADMIN — OXYBUTYNIN CHLORIDE 5 MG: 5 TABLET, EXTENDED RELEASE ORAL at 09:59

## 2024-02-13 RX ADMIN — ATORVASTATIN CALCIUM 10 MG: 10 TABLET, FILM COATED ORAL at 17:31

## 2024-02-13 RX ADMIN — CEFEPIME 2000 MG: 2 INJECTION, POWDER, FOR SOLUTION INTRAVENOUS at 17:53

## 2024-02-13 RX ADMIN — METHOCARBAMOL 250 MG: 500 TABLET ORAL at 05:34

## 2024-02-13 NOTE — NURSING NOTE
Patient set off bed alarm again, patient was found sitting up at the side of bed. Patient was educated on safety and reminded of her NWB status. Patient verbalized understanding.

## 2024-02-13 NOTE — ASSESSMENT & PLAN NOTE
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  Follow-up outpt for potential split thickness skin graft.  Observe off abx.

## 2024-02-13 NOTE — PROGRESS NOTES
[facility]  Progress Note  Name: Lona Cedeno I  MRN: [id number]  Unit/Bed#: -01 I Date of Admission: 2/9/2024   Date of Service: 2/13/2024 I Hospital Day: 4    Assessment/Plan   Hematoma  Assessment & Plan  S/p I&D hematoma/seroma of right axillary dissection operative site 2/4/24  Will watch site  Had reaccumulated after having it drained = stable.  Had PA from our service come see her 2/12 since she saw her over the weekend and felt to be stable    * Diabetic foot ulcer with osteomyelitis (HCC)  Assessment & Plan  S/p right foot wound/achilles debridement with foot/toe washout; partial amputation distal right 2nd toe; wound VAC 2/4/24  Podiatry saw here and following = they are changing dressings  Continue VAC dressing.  Follow-up outpt for potential split thickness skin graft.  Observe off abx.      Leukocytosis  Assessment & Plan  ?etio  Has had no fevers  Has no constitutional sx including no urinary sx  Podiatry was in yesterday and felt wound stable  Will get CBC again in AM  Possibly from the right chest hematoma although that has been stable.    Essential hypertension  Assessment & Plan  Home: Amiloride 5mg qd/Lisinopril 5mg qd  Here: Amiloride 5mg qd  BP getting into 90s systolic at times so will stop Amiloride    Type 2 diabetes mellitus with diabetic polyneuropathy (HCC)  Assessment & Plan  Lab Results   Component Value Date    HGBA1C 6.5 (H) 11/07/2023       Recent Labs     02/12/24  1047 02/12/24  1724 02/12/24  2127 02/13/24  0617   POCGLU 125 139 135 144*       Home: Januvia 100mg qd/Metformin 1000mg qd  Here: Metformin 500mg BID  Continue QID Accuchecks/SSI and DM diet  Stable; no changes today    PAD (peripheral artery disease) (HCC)  Assessment & Plan  S/p right axillary to femoral BPG 1/31/24  Continue ASA, Xarelto and atorvastatin.  Encourage tobacco cessation.  Price check on Xarelto = $20.00/mo    Anxiety and depression  Assessment &  Plan  Continue home medications Klonopin and Wellbutrin    Anemia  Assessment & Plan  Hgb stable    Tobacco use disorder  Assessment & Plan  Continue Nicotine patch.  Cessation encouraged.    COPD, severity to be determined (HCC)  Assessment & Plan  Continue Breo and albuterol.  Pt uses Advair 250/50 and albuterol at home.  Respiratory status stable             The above assessment and plan was reviewed and updated as determined by my evaluation of the patient on 2/13/2024.    Labs:   Results from last 7 days   Lab Units 02/13/24  0453 02/09/24  0540   WBC Thousand/uL 14.83* 9.90   HEMOGLOBIN g/dL 10.4* 10.3*   HEMATOCRIT % 32.8* 32.3*   PLATELETS Thousands/uL 404* 367     Results from last 7 days   Lab Units 02/13/24  0453 02/09/24  0540   SODIUM mmol/L 137 138   POTASSIUM mmol/L 4.4 4.4   CHLORIDE mmol/L 101 100   CO2 mmol/L 30 30   BUN mg/dL 25 14   CREATININE mg/dL 0.59* 0.59*   CALCIUM mg/dL 9.5 10.0             Results from last 7 days   Lab Units 02/13/24  0617 02/12/24  2127 02/12/24  1724   POC GLUCOSE mg/dl 144* 135 139       Imaging  No orders to display       Review of Scheduled Meds:  Current Facility-Administered Medications   Medication Dose Route Frequency Provider Last Rate    acetaminophen  975 mg Oral Q8H JILLIAN Denisse Berger PA-C      albuterol  2 puff Inhalation Q6H PRN Denisse Berger PA-C      aspirin  81 mg Oral Daily Denisse Berger PA-C      atorvastatin  10 mg Oral Daily With Dinner Denisse Berger PA-C      bisacodyl  10 mg Rectal Daily Denisse Berger PA-C      buPROPion  300 mg Oral Daily Denisse Berger PA-C      clonazePAM  1 mg Oral QPM Denisse Berger PA-C      docusate sodium  100 mg Oral BID Denisse Berger PA-C      fish oil  1,000 mg Oral Daily Denisse Berger PA-C      fluticasone-vilanterol  1 puff Inhalation Daily Denisse Berger PA-C      insulin lispro  1-5 Units Subcutaneous TID AC Denisse Alina-Hank, PA-C      insulin  lispro  1-5 Units Subcutaneous HS Denisse Berger PA-C      metFORMIN  500 mg Oral BID With Meals Denisse Berger PA-C      methocarbamol  250 mg Oral Q8H JILLIAN Denisse Berger PA-C      nicotine  1 patch Transdermal Daily Denisse Berger PA-C      ondansetron  4 mg Oral Q6H PRN Denisse Berger PA-C      oxybutynin  5 mg Oral Daily Denisse Berger PA-C      oxyCODONE  2.5 mg Oral Q4H PRN Denisse Berger PA-C      Or    oxyCODONE  5 mg Oral Q4H PRN Denisse Berger PA-C      pantoprazole  40 mg Oral Early Morning Denisse Berger PA-C      polyethylene glycol  17 g Oral Daily Denisse Berger PA-C      pregabalin  200 mg Oral TID Denisse Berger PA-C      rivaroxaban  2.5 mg Oral BID With Meals Denisse Berger PA-C      senna  2 tablet Oral HS Denisse Berger PA-C         Subjective/ HPI: Patient seen and examined. Patients overnight issues or events were reviewed with nursing staff. New or overnight issues include the following:     WBC up to 14 today w/o fevers, sx.  No overnight issues.  Offers no complaints    ROS:   A 10 point ROS was performed; negative except as noted above.        *Labs /Radiology studies Reviewed  *Medications  reviewed and reconciled as needed  *Please refer to order section for additional ordered labs studies      Physical Examination:  Vitals:   Vitals:    02/12/24 0604 02/12/24 1501 02/12/24 2128 02/13/24 0536   BP: 159/68 96/53 115/56 96/51   BP Location: Left arm Right arm Left arm Right arm   Pulse: 80 83 80 94   Resp: 18 18 16 16   Temp: 98.2 °F (36.8 °C) 98.1 °F (36.7 °C) 98.4 °F (36.9 °C) 98.9 °F (37.2 °C)   TempSrc: Oral Oral Oral Oral   SpO2: 94% 92% 94% 92%   Weight:       Height:           General Appearance: no distress, nontoxic appearing  HEENT:  External ear normal.  Nose normal w/o drainage. Mucous membranes are moist. Oropharynx is clear. Conjunctiva clear w/o icterus or redness.  Neck:  Supple, normal  ROM  Lungs: BBS without crackles/wheeze/rhonchi; respirations unlabored with normal inspiratory/expiratory effort.  No retractions noted.  On RA  CV: regular rate and rhythm; no rubs/murmurs/gallops, PMI normal   ABD: Abdomen is soft.  Bowel sounds all quadrants.  Nontender with no distention.    EXT: no edema  Skin: normal turgor, normal texture, no rashes  Psych: affect normal, mood normal  Neuro: AAO         The above physical exam was reviewed and updated as determined by my evaluation of the patient on 2/13/2024.    Invasive Devices       Drain  Duration             Ureteral Internal Stent Left ureter 6 Fr. 85 days                       VTE Pharmacologic Prophylaxis: Xarelto  Code Status: Level 1 - Full Code  Current Length of Stay: 4 day(s)    Total floor / unit time spent today 30 minutes  Coordination of patient's care was performed in conjunction with primary service. Time invested included review of patient's labs, vitals, and management of their comorbidities with continued monitoring, examination of patient as well as answering patient questions, documenting her findings and creating progress note in electronic medical record,  ordering appropriate diagnostic testing.       ** Please Note:  voice to text software may have been used in the creation of this document. Although proof errors in transcription or interpretation are a potential of such software**

## 2024-02-13 NOTE — PROGRESS NOTES
PM&R PROGRESS NOTE:  Lona Cedeno 78 y.o. female MRN: [id number]  Unit/Bed#: -01 Encounter: [id number]        Rehabilitation Diagnosis: Impairment of mobility, safety and Activities of Daily Living (ADLs) due to Other Disabling Impairments:  13  Other Disabling Impairments    Etiologic: Diabetic right foot ulcer with osteomyelitis s/p fem bypass, right foot wound and achilles debridement with 2nd toe partial amputation and VAC placement   Date of Onset: 2/26/24     Date of surgery: 1/31, 2/4    HPI: Lona Cedeno is a 78 y.o. female who  has a past medical history of Anxiety, Closed fracture of coccyx ([facility]) (05/24/2023), Diabetes mellitus ([facility]), GERD (gastroesophageal reflux disease), Hyperlipidemia, Hypertension, IBS (irritable bowel syndrome), and Shoulder fracture. who presented to the [facility] on 1/23/2024 from her podiatry office with a diabetic ulcer of the right toe and right ankle wound with achilles tendon exposed. She was found to have osteomyelitis of the right foot and was transferred to [facility] for consideration of revascularization and surgical opinion. She was recommended to undergo extra-anatomic bypass of the right axillary to femoral artery and right trans-tibial amputation was initially discussed, but decision was made to attempt limb salvage. She underwent the above bypass on 1/31/24 and then on 2/4/24 underwent right foot wound and Achilles debridement with 2nd toe partial amputation and VAC placement. Split-thickness skin graft was discussed among podiatry and plastic surgery, but has been deferred to a later time. Her hospital course was complicated by right chest wall hematoma and she underwent evacuation with vascular on 2/4/2024. VAC last changed on 2/8/24. No further OR interventions planned during this admission.     SUBJECTIVE: Patient seen face-to-face.  No severe issues overnight however did have some alarms set off overnight sitting up in bed at  the edge of bed without calling for assistance as well as at the bedside commode.  I discussed this with her nurse as well from overnight stated that she did not use the call bell and just set the alarms off.  Patient was redirectable and able to get back to sleep.  Education provided to the patient and will need to closely monitor as she remains at risk for falls.  Patient denies fever, chills, nausea, emesis, cough, shortness of breath, diarrhea, or constipation. Sleep was fine, mood stable. Pain is controlled.    From medical standpoint WBC count elevated at 14.8 which is a significant increase from the last labs that were checked.  She is being monitored off of antibiotics this time for osteomyelitis after the partial amputation with wound VAC placement.  I did ask her if she is having any issues with urination including frequency, dysuria or hematuria which she denies at this time.  Will need to formulate plan with internal medicine.    ASSESSMENT: Stable, progressing, self transferring without assistance      PLAN:    Rehabilitation  Functional deficits:  self care and mobility  Continue current rehabilitation plan of care to maximize function.    Functional update:   PT: CGA for bed mobility and min assist for transfers, no ambulation, toilet transfer total assist  OT: Total assistance for shower/bathing, max assist for oral hygiene, total assist for footwear and dressing, total assist for toileting    Estimated Discharge: To be determined in team conference      Pain  Acetaminophen 975mg every 8 hours  Robaxin 250 mg every 8 hours  Lyrica 200 mg 3 times daily  Oxycodone 2.5-5 mg every 4 hours as needed    DVT prophylaxis  On Xarelto 2.5 mg twice daily    Bladder plan  Continent    Bowel plan  Continent  Last bowel movement 2/11      * Diabetic foot ulcer with osteomyelitis (HCC)  Assessment & Plan  - Now monitoring off antibiotics per prior ID recommendations. S/p right axillary to femoral bypass on 1/31/24  as well as right foot wound and Achilles debridement with 2nd digit partial amputation and VAC placement on 2/4/24  - Trans-tibial amputation has been deferred at this time while monitoring on re-vascularization  - Podiatry and plastic surgery discussed case, no further OR interventions are planned during this admission, split-thickness skin graft may be considered in the future.   - Continue PT and OT  - NWB RLE        Leukocytosis  Assessment & Plan  -Elevated WBC count on 2/13 up to 14.8 which is an increase from a normal count.  Unclear etiology and may be related to other wounds may not be at all.  Will discuss further with team.    Acute pain due to injury  Assessment & Plan  - Tylenol 975 mg q8h scheduled  - Methocarbamol 250 mg q8h scheduled  - Pregabalan 200 mg TID scheduled  - Oxycodone 2.5 or 5 mg q4h PRN  - Will wean as tolerated    Impaired mobility and activities of daily living  Assessment & Plan  - Rehabilitation medicine physician for daily monitoring of care, 24 hour availability for acute  medical issues, medication management, and therapeutic and diagnostic assessments.  - 24 hour rehabilitation nursing 7 days per week for: management/teaching of medications,  bowel/bladder routine, skin care.  - PT, OT for 2-3 hours per day, 5 to 6 days per week; 15 hours per week  - Rehabilitation Psychology for adjustment and coping  - MSW for barriers to discharge, community resources, and family support  - Discharge planning following to help ensure a safe and efficient discharge      Wound of skin  Assessment & Plan  - Continue local wound care consisting of wound VAC to posterior right ankle ulcer with Dermagran to the anterior ankle wound and Betadine Adaptic to the right second partial toe amputation site  - Elevation on green foam wedges or pillows when non-ambulatory.  - Podiatry to follow for VAC changes and assessments  - Monitor clinically for signs of infection    At high risk for skin  "breakdown  Assessment & Plan  - Monitor for breakdown, frequent turns      At risk for venous thromboembolism (VTE)  Assessment & Plan  - On rivaroxaban    Constipation  Assessment & Plan  - Bowel regimen: Bisacodyl suppository daily, senna 2 tablets qHs, miralax daily scheduled  - Will follow BMs and adjust bowel regimen to target soft, formed stools q1-2 days, per pt's regular schedule.      Urinary incontinence  Assessment & Plan  - Will follow UOP and encourage spontaneous voids on timed void schedule. If unable to void spontaneously, will monitor with PVR bladder scans and initiate ISC regimen.  - Oxybutynin 5 mg daily      Anxiety and depression  Assessment & Plan  - Bupropion 300 mg daily  - Clonazepam 1 mg qHs    Essential hypertension  Assessment & Plan  Continue monitoring therapies management per internal medicine    Type 2 diabetes mellitus with diabetic polyneuropathy (HCC)  Assessment & Plan  Lab Results   Component Value Date    HGBA1C 6.5 (H) 11/07/2023       Recent Labs     02/11/24  1529 02/11/24  2041 02/12/24  0611 02/12/24  1047   POCGLU 184* 127 153* 125       Management per internal medicine  Continue to monitor and provide education however relatively controlled          Appreciate IM consultants medical co-management.  Labs, medications, and imaging personally reviewed.      ROS:  A ten point review of systems was completed on 02/13/24 and pertinent positives are listed in subjective section. All other systems reviewed were negative.     OBJECTIVE:   BP 96/51 (BP Location: Right arm)   Pulse 94   Temp 98.9 °F (37.2 °C) (Oral)   Resp 16   Ht 5' 1\" (1.549 m)   Wt 69.5 kg (153 lb 3.5 oz)   SpO2 92%   BMI 28.95 kg/m²     Physical Exam  Vitals reviewed.   Constitutional:       General: She is not in acute distress.  HENT:      Head: Normocephalic and atraumatic.      Right Ear: External ear normal.      Left Ear: External ear normal.      Nose: Nose normal. No rhinorrhea.      " Mouth/Throat:      Mouth: Mucous membranes are moist.      Pharynx: Oropharynx is clear.   Eyes:      General: No scleral icterus.  Cardiovascular:      Rate and Rhythm: Normal rate.   Pulmonary:      Effort: Pulmonary effort is normal. No respiratory distress.   Abdominal:      General: There is no distension.      Palpations: Abdomen is soft.   Musculoskeletal:      Cervical back: Normal range of motion.      Comments: VAC in place   Skin:     General: Skin is warm and dry.   Neurological:      General: No focal deficit present.      Mental Status: She is oriented to person, place, and time.      Comments: Generalized nonneurologic weakness in the bilateral lower extremities primarily proximal hip flexors   Psychiatric:         Mood and Affect: Mood normal.         Behavior: Behavior normal.          Lab Results   Component Value Date    WBC 14.83 (H) 02/13/2024    HGB 10.4 (L) 02/13/2024    HCT 32.8 (L) 02/13/2024    MCV 97 02/13/2024     (H) 02/13/2024     Lab Results   Component Value Date    SODIUM 137 02/13/2024    K 4.4 02/13/2024     02/13/2024    CO2 30 02/13/2024    BUN 25 02/13/2024    CREATININE 0.59 (L) 02/13/2024    GLUC 157 (H) 02/13/2024    CALCIUM 9.5 02/13/2024     Lab Results   Component Value Date    INR 1.03 02/04/2024    INR 1.11 02/03/2024    INR 1.03 01/23/2024    PROTIME 13.4 02/04/2024    PROTIME 14.2 02/03/2024    PROTIME 13.6 01/23/2024           Current Facility-Administered Medications:     acetaminophen (TYLENOL) tablet 975 mg, 975 mg, Oral, Q8H JILLIAN, Denisse Berger PA-C, 975 mg at 02/13/24 0534    albuterol (PROVENTIL HFA,VENTOLIN HFA) inhaler 2 puff, 2 puff, Inhalation, Q6H PRN, Denisse Fuller-Hank PA-C    AMILoride tablet 5 mg, 5 mg, Oral, Daily, Denisse Berger PA-C, 5 mg at 02/12/24 1027    aspirin (ECOTRIN LOW STRENGTH) EC tablet 81 mg, 81 mg, Oral, Daily, Denisse Berger PA-C, 81 mg at 02/12/24 1027    atorvastatin (LIPITOR) tablet 10 mg, 10 mg,  Oral, Daily With Dinner, Denisse Fuller-Hank, PA-C, 10 mg at 02/12/24 1738    bisacodyl (DULCOLAX) rectal suppository 10 mg, 10 mg, Rectal, Daily, Denissegrupo Fuller-Hank PA-C    buPROPion (WELLBUTRIN XL) 24 hr tablet 300 mg, 300 mg, Oral, Daily, Denisse Fuller-Hank PA-C, 300 mg at 02/12/24 1028    clonazePAM (KlonoPIN) tablet 1 mg, 1 mg, Oral, QPM, Denissegrupo Fuller-Hank, PA-C, 1 mg at 02/12/24 1738    docusate sodium (COLACE) capsule 100 mg, 100 mg, Oral, BID, Denisse Fuller-Hank, PA-C, 100 mg at 02/12/24 1737    fish oil capsule 1,000 mg, 1,000 mg, Oral, Daily, Denisse Fuller-Hank, PA-C, 1,000 mg at 02/12/24 1027    fluticasone-vilanterol 200-25 mcg/actuation 1 puff, 1 puff, Inhalation, Daily, Denisse Fuller-Hank, PA-C, 1 puff at 02/12/24 1029    insulin lispro (HumaLOG) 100 units/mL subcutaneous injection 1-5 Units, 1-5 Units, Subcutaneous, TID AC, 1 Units at 02/12/24 0855 **AND** Fingerstick Glucose (POCT), , , TID AC, Denisse Fuller-Hank, PA-C    insulin lispro (HumaLOG) 100 units/mL subcutaneous injection 1-5 Units, 1-5 Units, Subcutaneous, HS, Denisse Fuller-Hank, PA-C, 1 Units at 02/10/24 2133    metFORMIN (GLUCOPHAGE) tablet 500 mg, 500 mg, Oral, BID With Meals, Denisse Fullre-Hank PA-C, 500 mg at 02/12/24 1737    methocarbamol (ROBAXIN) tablet 250 mg, 250 mg, Oral, Q8H JILLIAN, Denisse Fuller-Hank, PA-C, 250 mg at 02/13/24 0534    nicotine (NICODERM CQ) 21 mg/24 hr TD 24 hr patch 1 patch, 1 patch, Transdermal, Daily, Denisse Fuller-Hank PA-C, 1 patch at 02/12/24 1029    ondansetron (ZOFRAN-ODT) dispersible tablet 4 mg, 4 mg, Oral, Q6H PRN, Denisse Berger PA-C    oxybutynin (DITROPAN-XL) 24 hr tablet 5 mg, 5 mg, Oral, Daily, Denisse Berger PA-C, 5 mg at 02/12/24 1027    oxyCODONE (ROXICODONE) split tablet 2.5 mg, 2.5 mg, Oral, Q4H PRN **OR** oxyCODONE (ROXICODONE) IR tablet 5 mg, 5 mg, Oral, Q4H PRN, Denisse Fuller-Hank PA-C, 5 mg at 02/12/24 1027    pantoprazole (PROTONIX) EC tablet 40 mg, 40 mg,  Oral, Early Morning, Denisse Alina-Tifton, PA-C, 40 mg at 02/12/24 0854    polyethylene glycol (MIRALAX) packet 17 g, 17 g, Oral, Daily, Denisse Alina-Tifton, PA-C, 17 g at 02/12/24 1026    pregabalin (LYRICA) capsule 200 mg, 200 mg, Oral, TID, Denisse Alina-Hank, PA-C, 200 mg at 02/12/24 2200    rivaroxaban (XARELTO) tablet 2.5 mg, 2.5 mg, Oral, BID With Meals, Denisse Alina-Hank, PA-C, 2.5 mg at 02/12/24 1737    senna (SENOKOT) tablet 17.2 mg, 2 tablet, Oral, HS, Denisse Alina-Hank, PA-C, 17.2 mg at 02/12/24 2248    Past Medical History:   Diagnosis Date    Anxiety     Closed fracture of coccyx ([facility]) 05/24/2023    Diabetes mellitus ([facility])     GERD (gastroesophageal reflux disease)     Hyperlipidemia     Hypertension     IBS (irritable bowel syndrome)     Shoulder fracture        Patient Active Problem List    Diagnosis Date Noted    Diabetic foot ulcer with osteomyelitis ([facility]) 01/23/2024    Leukocytosis 02/13/2024    At risk for venous thromboembolism (VTE) 02/09/2024    At high risk for skin breakdown 02/09/2024    Wound of skin 02/09/2024    Impaired mobility and activities of daily living 02/09/2024    Acute pain due to injury 02/09/2024    Hematoma 02/06/2024    Anemia 02/06/2024    Constipation 01/26/2024    Preoperative cardiovascular examination 01/25/2024    Ankle ulcer, right, with fat layer exposed ([facility]) 12/14/2023    Age-related osteoporosis without current pathological fracture 11/28/2023    Abnormal CT of the chest 10/17/2023    COPD, severity to be determined ([facility]) 10/17/2023    Tobacco use disorder 10/17/2023    PAD (peripheral artery disease) ([facility]) 10/10/2023    Bladder neoplasm 09/11/2023    Left renal mass 09/11/2023    Lactic acidosis 08/11/2023    Hypomagnesemia 08/11/2023    Degenerative lumbar disc 05/24/2023    Urinary incontinence 04/13/2023    GERD without esophagitis 10/16/2019    Medicare annual wellness visit, subsequent 09/23/2019    Essential hypertension 02/15/2019    Anxiety and  depression 02/15/2019    Type 2 diabetes mellitus with diabetic polyneuropathy (HCC) 02/12/2019          Rigo Hoover,   Physical Medicine and Rehabilitation  [facility]    I have spent a total time of 35 minutes on 02/13/24 in caring for this patient including Counseling / Coordination of care, Documenting in the medical record, Reviewing / ordering tests, medicine, procedures  , and Communicating with other healthcare professionals .      ** Please Note:  voice to text software may have been used in the creation of this document. Although proof errors in transcription or interpretation are a potential of such software**

## 2024-02-13 NOTE — PLAN OF CARE
Problem: Prexisting or High Potential for Compromised Skin Integrity  Goal: Skin integrity is maintained or improved  Description: INTERVENTIONS:  - Identify patients at risk for skin breakdown  - Assess and monitor skin integrity  - Assess and monitor nutrition and hydration status  - Monitor labs   - Assess for incontinence   - Turn and reposition patient  - Assist with mobility/ambulation  - Relieve pressure over bony prominences  - Avoid friction and shearing  - Provide appropriate hygiene as needed including keeping skin clean and dry  - Evaluate need for skin moisturizer/barrier cream  - Collaborate with interdisciplinary team   - Patient/family teaching  - Consider wound care consult   Outcome: Progressing     Problem: PAIN - ADULT  Goal: Verbalizes/displays adequate comfort level or baseline comfort level  Description: Interventions:  - Encourage patient to monitor pain and request assistance  - Assess pain using appropriate pain scale  - Administer analgesics based on type and severity of pain and evaluate response  - Implement non-pharmacological measures as appropriate and evaluate response  - Consider cultural and social influences on pain and pain management  - Notify physician/advanced practitioner if interventions unsuccessful or patient reports new pain  Outcome: Progressing     Problem: INFECTION - ADULT  Goal: Absence or prevention of progression during hospitalization  Description: INTERVENTIONS:  - Assess and monitor for signs and symptoms of infection  - Monitor lab/diagnostic results  - Monitor all insertion sites, i.e. indwelling lines, tubes, and drains  - Monitor endotracheal if appropriate and nasal secretions for changes in amount and color  - [place] appropriate cooling/warming therapies per order  - Administer medications as ordered  - Instruct and encourage patient and family to use good hand hygiene technique  - Identify and instruct in appropriate isolation precautions for  identified infection/condition  Outcome: Progressing     Problem: SAFETY ADULT  Goal: Patient will remain free of falls  Description: INTERVENTIONS:  - Educate patient/family on patient safety including physical limitations  - Instruct patient to call for assistance with activity   - Consult OT/PT to assist with strengthening/mobility   - Keep Call bell within reach  - Keep bed low and locked with side rails adjusted as appropriate  - Keep care items and personal belongings within reach  - Initiate and maintain comfort rounds  - Make Fall Risk Sign visible to staff  - Offer Toileting every 1 Hours, in advance of need  - Initiate/Maintain middle sensitivity alarm while in bed, chair alarm when out of bed  - Obtain necessary fall risk management equipment  - Apply yellow socks and bracelet for high fall risk patients  - Consider moving patient to room near nurses station  Outcome: Progressing  Goal: Maintain or return to baseline ADL function  Description: INTERVENTIONS:  -  Assess patient's ability to carry out ADLs; assess patient's baseline for ADL function and identify physical deficits which impact ability to perform ADLs (bathing, care of mouth/teeth, toileting, grooming, dressing, etc.)  - Assess/evaluate cause of self-care deficits   - Assess range of motion  - Assess patient's mobility; develop plan if impaired  - Assess patient's need for assistive devices and provide as appropriate  - Encourage maximum independence but intervene and supervise when necessary  - Involve family in performance of ADLs  - Assess for home care needs following discharge   - Consider OT consult to assist with ADL evaluation and planning for discharge  - Provide patient education as appropriate  Outcome: Progressing  Goal: Maintains/Returns to pre admission functional level  Description: INTERVENTIONS:  - Perform AM-PAC 6 Click Basic Mobility/ Daily Activity assessment daily.  - Set and communicate daily mobility goal to care team  and patient/family/caregiver.   - Collaborate with rehabilitation services on mobility goals if consulted  - Perform Range of Motion 3 times a day.  - Reposition patient every 2 hours.  - Dangle patient 3 times a day  - Stand patient 3 times a day  - Ambulate patient 3 times a day  - Out of bed to chair 3 times a day   - Out of bed for meals 3 times a day  - Out of bed for toileting  - Record patient progress and toleration of activity level   Outcome: Progressing     Problem: DISCHARGE PLANNING  Goal: Discharge to home or other facility with appropriate resources  Description: INTERVENTIONS:  - Identify barriers to discharge w/patient and caregiver  - Arrange for needed discharge resources and transportation as appropriate  - Identify discharge learning needs (meds, wound care, etc.)  - Arrange for interpretive services to assist at discharge as needed  - Refer to Case Management Department for coordinating discharge planning if the patient needs post-hospital services based on physician/advanced practitioner order or complex needs related to functional status, cognitive ability, or social support system  Outcome: Progressing

## 2024-02-13 NOTE — PROGRESS NOTES
"   02/13/24 0800   Pain Assessment   Pain Assessment Tool 0-10   Pain Score No Pain   Restrictions/Precautions   Precautions Bed/chair alarms;Cognitive;Fall Risk;Supervision on toilet/commode;Hard of hearing;Impulsive  (RLE wound vac)   Weight Bearing Restrictions Yes   RLE Weight Bearing Per Order NWB   Braces or Orthoses   (R prevalon boot- PT removed for transfers to ensure patient was not tempted to place weight onto foot)   Cognition   Overall Cognitive Status Impaired   Arousal/Participation Alert;Cooperative   Comments poor insight into deficits and adherence to WBS   Subjective   Subjective \"Will this ever get better?\"   Roll Left and Right   Type of Assistance Needed Supervision   Roll Left and Right CARE Score 4   Sit to Lying   Type of Assistance Needed Supervision   Sit to Lying CARE Score 4   Lying to Sitting on Side of Bed   Type of Assistance Needed Supervision   Lying to Sitting on Side of Bed CARE Score 4   Sit to Stand   Comment Patient with difficlty standing without putting weight on her RLE. Recommend practice within // bars but would not encourage standing with walker   Bed-Chair Transfer   Type of Assistance Needed Physical assistance   Physical Assistance Level 25% or less   Comment Margaux sit pivot transfer to R and L side; assist needed to ensure maintenace of WBS; patient with difficulty setting up the transfer and will benefit from repeated practice in fixed locations then progressing to variable locations   Chair/Bed-to-Chair Transfer CARE Score 3   Transfer Bed/Chair/Wheelchair   Limitations Noted In Balance;Endurance;Problem Solving;Sequencing;LE Strength   Adaptive Equipment None   Car Transfer   Comment please perform again next session   Walk 10 Feet   Reason if not Attempted Safety concerns   Walk 10 Feet CARE Score 88   Walk 50 Feet with Two Turns   Reason if not Attempted Safety concerns   Walk 50 Feet with Two Turns CARE Score 88   Walk 150 Feet   Reason if not Attempted Safety " "concerns   Walk 150 Feet CARE Score 88   Walking 10 Feet on Uneven Surfaces   Reason if not Attempted Safety concerns   Walking 10 Feet on Uneven Surfaces CARE Score 88   Ambulation   Findings (S)  Recommend patient practice for strength and endurance within // bars however will not encourage ambulation for home unless absoltely necessary(cannot fit in necessary room)   Wheel 50 Feet with Two Turns   Type of Assistance Needed Incidental touching;Verbal cues   Physical Assistance Level No physical assistance   Wheel 50 Feet with Two Turns CARE Score 4   Wheel 150 Feet   Type of Assistance Needed Incidental touching;Verbal cues   Physical Assistance Level No physical assistance   Wheel 150 Feet CARE Score 4   Wheelchair mobility   Does the patient use a wheelchair? 1. Yes   Type of Wheelchair Used 1. Manual   Method Right upper extremity;Left upper extremity   Findings Patient with discomfort in RUE- limited tolerance to w/c mobility; plan to switch w/c from an 18\" width chair to a 16\" width chair to facilaite ease with propulsion   Curb or Single Stair   Reason if not Attempted Safety concerns   1 Step (Curb) CARE Score 88   4 Steps   Reason if not Attempted Safety concerns   4 Steps CARE Score 88   12 Steps   Reason if not Attempted Safety concerns   12 Steps CARE Score 88   Picking Up Object   Reason if not Attempted Safety concerns   Picking Up Object CARE Score 88   Therapeutic Interventions   Strengthening BLE ankle pumps 20x, quad sets 2x10, gluteal sets 2x10, LAQ 2x8, hip flexion 2x8   Flexibility BLE heel cord and HS gentle stretch TERT 4 minutes   Balance repeated sit pivot transfers w/c<-> mat   Other Reviewed the importance of weight shifting to alleviate pressure at buttocks where wound is current present. Patient able to WS R, L and forwards (unable to perform a partial stance without PT assisting with RLE NWB adherence). Patient able to maintain this position for 30 seconds. Will provide handouts to " "promote carryover. Spoke to patient about the importance of changing positions throughout the day and to avoid sitting OOB in the wheelchair all day which is what she did yesterday.   Other Comments   Comments During session, patient asked, \"will this (her foot) ever get better\". Took time to provide education on skin integrity and ways that she can facilitate the healing process including 1) adhering to the NWB status recommended by the Physician 2) proper nutrition to promote healing including prioritization of protein and managing her diabetes 3) smoking cessation 4) mindfullness of the therapy and team recommendations to decrease fall risk   Assessment   Treatment Assessment Patient engaged in PT treatment session focused on LE therex for strengthening, transfer training, and education. Patient with poor carryover of education and will need handouts and repeated review in order to facilitate learning. She is impulsive and not adhering to the safety measures in place by the team. Nursing charted over night, patient took her self to the BSC 1x with alarm going off and attempted to get OOVB another time. Will review with team in standup to ensure safety measures in place to decrease the risk for falling. PT plans to call daughter later this date and invite her in for training/review over the weekend if she cannot attend during the week 2* work schedule. Will follow up.   Problem List Decreased strength;Decreased endurance;Impaired balance;Decreased mobility;Decreased cognition;Decreased safety awareness;Orthopedic restrictions   Barriers to Discharge Decreased caregiver support;Inaccessible home environment   PT Barriers   Functional Limitation Car transfers;Ramp negotiation;Stair negotiation;Standing;Transfers;Walking;Wheelchair management   Plan   Treatment/Interventions Functional transfer training;LE strengthening/ROM;Therapeutic exercise;Endurance training;Cognitive reorientation;Patient/family " training;Equipment eval/education;Bed mobility   Progress Slow progress, cognitive deficits   PT Therapy Minutes   PT Time In 0800   PT Time Out 0900   PT Total Time (minutes) 60   PT Mode of treatment - Individual (minutes) 60   PT Mode of treatment - Concurrent (minutes) 0   PT Mode of treatment - Group (minutes) 0   PT Mode of treatment - Co-treat (minutes) 0   PT Mode of Treatment - Total time(minutes) 60 minutes   PT Cumulative Minutes 270

## 2024-02-13 NOTE — QUICK NOTE
Called that pt had chills, fever of 101.6  Ordered rule out COVID/flu/RSV  To get blood cx x 2, urine cx, lactic acid, procalc  After cultures are obtained then will give Cefepime 2Gm IV q12hrs

## 2024-02-13 NOTE — PHYSICAL THERAPY NOTE
PHYSICAL THERAPY    PT called and spoke with patient's daughter. Brief update provided to her on PT POC and barriers. Obtained email address and sent over the sheet for her to obtain pictures and measurements. Sent Ramping information over as well.     DAUGHTER WILL BE IN ON SUNDAY FROM 10-2 FOR PT AND OT TRAINING.    Shaneka Wilcox PT, DPT

## 2024-02-13 NOTE — PROGRESS NOTES
"   02/13/24 0921   Pain Assessment   Pain Assessment Tool 0-10   Pain Score No Pain   Restrictions/Precautions   Precautions Bed/chair alarms;Cognitive;Fall Risk;Impulsive;Supervision on toilet/commode;Pain;Pressure Ulcer  (RLE wound vac)   Weight Bearing Restrictions Yes   RLE Weight Bearing Per Order NWB   Braces or Orthoses   (R prevalon boot- PT removed for transfers to ensure patient was not tempted to place weight onto foot)   Cognition   Overall Cognitive Status Impaired   Arousal/Participation Alert;Cooperative   Subjective   Subjective \"I'm going to need to use the bathroom\"   Transfer Bed/Chair/Wheelchair   Findings PT obtained a 16\" w/c and ROHO cushion for patient. OT able to transfer patient into this chair. Better fit for patient in regards to ability to fit through doorways as well as for self propulsion   Wheel 50 Feet with Two Turns   Type of Assistance Needed Verbal cues;Incidental touching   Physical Assistance Level No physical assistance   Wheel 50 Feet with Two Turns CARE Score 4   Wheelchair mobility   Does the patient use a wheelchair? 1. Yes   Type of Wheelchair Used 1. Manual   Method Right upper extremity;Left upper extremity   Assistance Provided For Locking Brakes;Remove Leg Rest;Replace Leg Rest   Distance Level Surface (feet) 50 ft   Findings PT reviewed parts of wheelchair with patient. Spent some time donning and doffing elevated foot rests. WILL NEED CONTINUED PRACTICE   Toilet Transfer   Type of Assistance Needed Physical assistance   Physical Assistance Level 25% or less   Comment Margaux sit pivot transfer; needed assist to maintain WBS. Patient encouraged to perfiorm weight shifting for CM. Patient able to doff pants and underwear with cues to keep LLE grounded on the floor. Had more difficulty with pants up. patient performed a partial push up and PT was able to pull clothing over hips. Encouraged patient consider wearing a night gown for home   Toilet Transfer CARE Score 3 "   Assessment   Treatment Assessment PT treatment session focused on w/c mobility management and toileting transfer. Patient will need continued review of education to faciltiate learning. She remains a high fall risk and presents with poor insight into her deficits. TEAM TO TRIAL NIGHT TIME VIRTUAL 1:1. PT RECOMMENDS LEAVING THE TOILET BUCKET OUT TO DECREASE PATIENT GOING TO USE THE BATHROOM ON HER OWN. RECOMMEND MIDDLE SETTING OF BED.   Problem List Decreased strength;Decreased endurance;Impaired balance;Decreased mobility;Decreased cognition;Decreased safety awareness;Orthopedic restrictions   Barriers to Discharge Decreased caregiver support;Inaccessible home environment   PT Barriers   Functional Limitation Car transfers;Ramp negotiation;Stair negotiation;Standing;Transfers;Walking;Wheelchair management   PT Therapy Minutes   PT Time In 0930   PT Time Out 1000   PT Total Time (minutes) 30   PT Mode of treatment - Individual (minutes) 30   PT Mode of treatment - Concurrent (minutes) 0   PT Mode of treatment - Group (minutes) 0   PT Mode of treatment - Co-treat (minutes) 0   PT Mode of Treatment - Total time(minutes) 30 minutes   PT Cumulative Minutes 300

## 2024-02-13 NOTE — PLAN OF CARE
Problem: Prexisting or High Potential for Compromised Skin Integrity  Goal: Skin integrity is maintained or improved  Description: INTERVENTIONS:  - Identify patients at risk for skin breakdown  - Assess and monitor skin integrity  - Assess and monitor nutrition and hydration status  - Monitor labs   - Assess for incontinence   - Turn and reposition patient  - Assist with mobility/ambulation  - Relieve pressure over bony prominences  - Avoid friction and shearing  - Provide appropriate hygiene as needed including keeping skin clean and dry  - Evaluate need for skin moisturizer/barrier cream  - Collaborate with interdisciplinary team   - Patient/family teaching  - Consider wound care consult   Outcome: Progressing     Problem: PAIN - ADULT  Goal: Verbalizes/displays adequate comfort level or baseline comfort level  Description: Interventions:  - Encourage patient to monitor pain and request assistance  - Assess pain using appropriate pain scale  - Administer analgesics based on type and severity of pain and evaluate response  - Implement non-pharmacological measures as appropriate and evaluate response  - Consider cultural and social influences on pain and pain management  - Notify physician/advanced practitioner if interventions unsuccessful or patient reports new pain  Outcome: Progressing     Problem: INFECTION - ADULT  Goal: Absence or prevention of progression during hospitalization  Description: INTERVENTIONS:  - Assess and monitor for signs and symptoms of infection  - Monitor lab/diagnostic results  - Monitor all insertion sites, i.e. indwelling lines, tubes, and drains  - Monitor endotracheal if appropriate and nasal secretions for changes in amount and color  - [place] appropriate cooling/warming therapies per order  - Administer medications as ordered  - Instruct and encourage patient and family to use good hand hygiene technique  - Identify and instruct in appropriate isolation precautions for  identified infection/condition  Outcome: Progressing     Problem: SAFETY ADULT  Goal: Patient will remain free of falls  Description: INTERVENTIONS:  - Educate patient/family on patient safety including physical limitations  - Instruct patient to call for assistance with activity   - Consult OT/PT to assist with strengthening/mobility   - Keep Call bell within reach  - Keep bed low and locked with side rails adjusted as appropriate  - Keep care items and personal belongings within reach  - Initiate and maintain comfort rounds  - Make Fall Risk Sign visible to staff  - Offer Toileting every 2 Hours, in advance of need  - Initiate/Maintain bed/chair alarm  - Obtain necessary fall risk management equipment: alarms  - Apply yellow socks and bracelet for high fall risk patients  - Consider moving patient to room near nurses station  Outcome: Progressing  Goal: Maintain or return to baseline ADL function  Description: INTERVENTIONS:  -  Assess patient's ability to carry out ADLs; assess patient's baseline for ADL function and identify physical deficits which impact ability to perform ADLs (bathing, care of mouth/teeth, toileting, grooming, dressing, etc.)  - Assess/evaluate cause of self-care deficits   - Assess range of motion  - Assess patient's mobility; develop plan if impaired  - Assess patient's need for assistive devices and provide as appropriate  - Encourage maximum independence but intervene and supervise when necessary  - Involve family in performance of ADLs  - Assess for home care needs following discharge   - Consider OT consult to assist with ADL evaluation and planning for discharge  - Provide patient education as appropriate  Outcome: Progressing  Goal: Maintains/Returns to pre admission functional level  Description: INTERVENTIONS:  - Perform AM-PAC 6 Click Basic Mobility/ Daily Activity assessment daily.  - Set and communicate daily mobility goal to care team and patient/family/caregiver.   -  Collaborate with rehabilitation services on mobility goals if consulted  - Perform Range of Motion 3 times a day.  - Reposition patient every 2 hours.  - Dangle patient 3 times a day  - Stand patient 3 times a day  - Ambulate patient 3 times a day  - Out of bed to chair 3 times a day   - Out of bed for meals 3 times a day  - Out of bed for toileting  - Record patient progress and toleration of activity level   Outcome: Progressing     Problem: DISCHARGE PLANNING  Goal: Discharge to home or other facility with appropriate resources  Description: INTERVENTIONS:  - Identify barriers to discharge w/patient and caregiver  - Arrange for needed discharge resources and transportation as appropriate  - Identify discharge learning needs (meds, wound care, etc.)  - Arrange for interpretive services to assist at discharge as needed  - Refer to Case Management Department for coordinating discharge planning if the patient needs post-hospital services based on physician/advanced practitioner order or complex needs related to functional status, cognitive ability, or social support system  Outcome: Progressing

## 2024-02-13 NOTE — PCC OCCUPATIONAL THERAPY
Occupational Therapy Weekly Team Note    Pt continues to make good progress with skilled occupational therapy intervention and is progressing toward long term goals for ADL, IADL's, and functional transfers/mobility. Pts long term goals for ADLs are Independent and superviosion with wheelchair. Pt continues to present with impairments in activity tolerance, endurance, standing balance/tolerance, sitting balance/tolerance, memory, insight, and safety . Occupational performance remains limited by fatigue, orthopedic restricitions , WBS , and risk for falls. Family training/education will be required prior to D/C.     Pt will continue to benefit from skilled acute rehab OT services to address above mentioned barriers and maximize functional independence in baseline areas of occupation to meet established treatment goals with overall decreased burden of care. Plan of care to continue to focus on ADL Retraining , LB Dressing,  LHAE education/training, Functional Transfers, Functional Attention, Standing tolerance, Standing balance , DME training/education, Family training/education, Energy conservation training/education, healthy coping education, and Leisure and social pursuits. Goals for the upcoming week are: basic ADL participation , Out of bed tolerance, establish caregiver abilities, and establish DME needs  Anticipate Discharge date to be set. .

## 2024-02-13 NOTE — ASSESSMENT & PLAN NOTE
Lab Results   Component Value Date    HGBA1C 6.5 (H) 11/07/2023       Recent Labs     02/12/24  1047 02/12/24  1724 02/12/24  2127 02/13/24  0617   POCGLU 125 139 135 144*       Home: Januvia 100mg qd/Metformin 1000mg qd  Here: Metformin 500mg BID  Continue QID Accuchecks/SSI and DM diet  Stable; no changes today

## 2024-02-13 NOTE — PROGRESS NOTES
"   02/13/24 1400   Pain Assessment   Pain Assessment Tool 0-10   Pain Score 5   Pain Location/Orientation Orientation: Left  (big toe)   Pain Onset/Description Descriptor: [facility] Pain Intervention(s) Repositioned   Lifestyle   Autonomy \"I really am tired.\"   Toileting Hygiene   Type of Assistance Needed Physical assistance   Physical Assistance Level 51%-75%   Comment does req cues for L LE placement on the floor   Toileting Hygiene CARE Score 2   Right Upper Extremity- Strength   R Shoulder ABduction;Horizontal ABduction   R Elbow Elbow flexion;Elbow extension   RUE Strength Comment Engaged in upright supine light b/l UE TE utilizing red theraband to perform 2 sets of 15 reps of indicated planes with focus on increasing UE strength and overall activity tolerance for fxnl transfer's and W/C mobility. Pt toelrated well,no c/o pain other than her foot.   Cognition   Comments she is able to state and demonstrate how to call for help at this time. verbalizes understanding to use call bell.  Bed alarm was not on at time of arrival to room. Room set up for toileting items to require set up (placed in bathroom) to reduce the temptation to transfer self to BSC. BCA activated at this time.   Additional Activities   Additional Activities Comments assisted with positioning in bed as she was moving around alot 2* reported R foot pain, causing poor positioning of prevalon boot. Floated b/l heels off bed.    Assessment   Treatment Assessment Pt participated in skilled OT treatment session with treatment focus on ADL re-training, fxnl xfers, UE strengthening, and UE endurance. Pt tolerated session fair. Session limited to repeated nursing needs for blood draw, IV placement. Pt w/ slight fever prompting a series of testing. Pt reports toileting prior to session and denies need to complete at this time. Pt was able to verbalize sequence and reports \"it went well.\" Able to complete light TE for UE use in bed, as she states " "she was \"too tired\" to get out of bed again. Patient would benefit from upcoming scheduled ADL to be completed EOB and continued trial/assessment of LBAE for carryover of today's earlier session. Continue OT plan of care with focus on ADL training specifically toileting with lateral weight shifting, functional transfers, sitting balance/tolerance activity, IADLs at w/c level, w/c mgmt, mobility and safety, strengthening, and overall activity tolerance.   OT Therapy Minutes   OT Time In 1400   OT Time Out 1500   OT Total Time (minutes) 60   OT Mode of treatment - Individual (minutes) 60   OT Mode of treatment - Concurrent (minutes) 0   OT Mode of treatment - Group (minutes) 0   OT Mode of treatment - Co-treat (minutes) 0   OT Mode of Treatment - Total time(minutes) 60 minutes   OT Cumulative Minutes 360       "

## 2024-02-13 NOTE — ASSESSMENT & PLAN NOTE
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  Had PA from our service come see her 2/12 since she saw her over the weekend and felt to be stable

## 2024-02-13 NOTE — ASSESSMENT & PLAN NOTE
Continue Nicotine patch.  Cessation encouraged.   [FreeTextEntry1] : I had the pleasure of seeing Mr. ROMAN WILSON in the office today.  He is a 83 year right-handed patient who was referred for neurologic evaluation at your kind suggestion.  He was accompanied by Mrs. 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  In 2019, he began experiencing difficulties with gait and balance.  He would occasionally experience slurred speech.  These episodes were fleeting.  In January 2021, he was hospitalized at [facility].  MRI of the brain revealed foci of T2 and flair hyperintensity in the subcortical and periventricular cerebral white matter suggesting mild chronic ischemic small vessel disease.  There were remote lacunar infarcts in the gangliocapsular regions and right frontal corona radiata.  There was a tiny focus of susceptibility in the right parietal region suggesting chronic microhemorrhage.\par \par He was subsequently evaluated by Dr. Katherine Ann.  MRI of the cervical spine did not reveal evidence of myelopathy.  Serologies were unremarkable.  A neuropsychological evaluation revealed mild cognitive impairment.\par \par According to Mrs. Wilson, he has had short-term memory loss or repetitiveness for a year.  His speech is occasionally thick.  He is hard of hearing.  He complains of distortion of images.  He describes the chairs in the dining room leaning backwards.  He has urinary frequency.  He is undergoing physical therapy.\par \par Past surgical history is notable for cataract extractions, bilateral knee replacements and radiation for cancer of the prostate.  Medical problems include hypertension hyperlipidemia, atrial fibrillation, irritable bowel syndrome and prostate cancer.  He has no allergies.  Medications include apixaban 5 mg twice a day, metoprolol 50 mg twice a day, amlodipine 10 mg daily and atorvastatin 20 mg daily.\par \par He is  and is a retired publishing executive.  He is a former smoker and drinks socially.  Family history is notable for a brother who suffers from Alzheimer's disease.  His mother suffered from diabetes and his father from gastrointestinal problems.\par \par

## 2024-02-13 NOTE — ASSESSMENT & PLAN NOTE
-Elevated WBC count on 2/13 up to 14.8 which is an increase from a normal count.  Unclear etiology and may be related to other wounds may not be at all.  Will discuss further with team.

## 2024-02-13 NOTE — PCC PHYSICAL THERAPY
964 MH  Current LOS 5 days (Wednesday)  Medicare expected LOS  Goals set for 2 weeks (I) level, w/c mobility goals  Patient lives with spouse and daughter but will need to achieve mod(I) level   Working with patient to get pictures and measurements of house- daughter Rozina will be a support and obtain this but does work FT  She has 5+5 vs 5+2 PATO home which will be a large barrier to overcome  Barriers: RLE NWB status impacting her ability to transfer, walk and perform stairs. Impulsivity impacting her safety with transfers.  Status: mod Ax1 sit pivot transfers, mod/max verbal cues to maintain WBS, S w/c level but fatigues quickly and reports chronic R shoulder pain, stairs unsafe at this time due to impulsivity and maintenance  of WBS      POC: strength and conditioning, repetitive transfer training, need to determine how she will enter the home  Will need to bring daughter in for training, possibly OVER THE WEEKEND IF SHE WORKS

## 2024-02-13 NOTE — NURSING NOTE
Patient set off chair alarm, patient was found sitting on BSC. (Patient transferred herself from wheelchair to BSC) Patient was educated on safety awareness and reminded of her NWB status. Patient verbalized understanding.

## 2024-02-13 NOTE — ASSESSMENT & PLAN NOTE
Home: Amiloride 5mg qd/Lisinopril 5mg qd  Here: Amiloride 5mg qd  BP getting into 90s systolic at times so will stop Amiloride

## 2024-02-13 NOTE — ASSESSMENT & PLAN NOTE
?etio  Has had no fevers  Has no constitutional sx including no urinary sx  Podiatry was in yesterday and felt wound stable  Will get CBC again in AM  Possibly from the right chest hematoma although that has been stable.

## 2024-02-14 ENCOUNTER — APPOINTMENT (INPATIENT)
Dept: RADIOLOGY | Facility: HOSPITAL | Age: 79
DRG: 856 | End: 2024-02-14
Payer: COMMERCIAL

## 2024-02-14 ENCOUNTER — TRANSITIONAL CARE MANAGEMENT (OUTPATIENT)
Dept: FAMILY MEDICINE CLINIC | Facility: CLINIC | Age: 79
End: 2024-02-14

## 2024-02-14 ENCOUNTER — ANESTHESIA EVENT (INPATIENT)
Dept: PERIOP | Facility: HOSPITAL | Age: 79
DRG: 856 | End: 2024-02-14
Payer: COMMERCIAL

## 2024-02-14 ENCOUNTER — ANESTHESIA (INPATIENT)
Dept: PERIOP | Facility: HOSPITAL | Age: 79
DRG: 856 | End: 2024-02-14
Payer: COMMERCIAL

## 2024-02-14 LAB
ANION GAP SERPL CALCULATED.3IONS-SCNC: 8 MMOL/L
APTT PPP: 24 SECONDS (ref 23–37)
BACTERIA UR QL AUTO: ABNORMAL /HPF
BILIRUB UR QL STRIP: NEGATIVE
BUN SERPL-MCNC: 21 MG/DL (ref 5–25)
CALCIUM SERPL-MCNC: 10 MG/DL (ref 8.4–10.2)
CHLORIDE SERPL-SCNC: 99 MMOL/L (ref 96–108)
CLARITY UR: CLEAR
CO2 SERPL-SCNC: 31 MMOL/L (ref 21–32)
COLOR UR: ABNORMAL
CREAT SERPL-MCNC: 0.67 MG/DL (ref 0.6–1.3)
ERYTHROCYTE [DISTWIDTH] IN BLOOD BY AUTOMATED COUNT: 15 % (ref 11.6–15.1)
GFR SERPL CREATININE-BSD FRML MDRD: 85 ML/MIN/1.73SQ M
GLUCOSE SERPL-MCNC: 100 MG/DL (ref 65–140)
GLUCOSE SERPL-MCNC: 104 MG/DL (ref 65–140)
GLUCOSE SERPL-MCNC: 120 MG/DL (ref 65–140)
GLUCOSE SERPL-MCNC: 158 MG/DL (ref 65–140)
GLUCOSE SERPL-MCNC: 179 MG/DL (ref 65–140)
GLUCOSE SERPL-MCNC: 186 MG/DL (ref 65–140)
GLUCOSE UR STRIP-MCNC: NEGATIVE MG/DL
HCT VFR BLD AUTO: 33.9 % (ref 34.8–46.1)
HGB BLD-MCNC: 11 G/DL (ref 11.5–15.4)
HGB UR QL STRIP.AUTO: NEGATIVE
INR PPP: 1.16 (ref 0.84–1.19)
KETONES UR STRIP-MCNC: NEGATIVE MG/DL
LEUKOCYTE ESTERASE UR QL STRIP: ABNORMAL
MCH RBC QN AUTO: 31.3 PG (ref 26.8–34.3)
MCHC RBC AUTO-ENTMCNC: 32.4 G/DL (ref 31.4–37.4)
MCV RBC AUTO: 97 FL (ref 82–98)
NITRITE UR QL STRIP: NEGATIVE
NON-SQ EPI CELLS URNS QL MICRO: ABNORMAL /HPF
PH UR STRIP.AUTO: 5.5 [PH]
PLATELET # BLD AUTO: 391 THOUSANDS/UL (ref 149–390)
PMV BLD AUTO: 9.4 FL (ref 8.9–12.7)
POTASSIUM SERPL-SCNC: 3.8 MMOL/L (ref 3.5–5.3)
PROCALCITONIN SERPL-MCNC: 0.52 NG/ML
PROT UR STRIP-MCNC: NEGATIVE MG/DL
PROTHROMBIN TIME: 14.7 SECONDS (ref 11.6–14.5)
RBC # BLD AUTO: 3.51 MILLION/UL (ref 3.81–5.12)
RBC #/AREA URNS AUTO: ABNORMAL /HPF
SODIUM SERPL-SCNC: 138 MMOL/L (ref 135–147)
SP GR UR STRIP.AUTO: 1.01 (ref 1–1.03)
UROBILINOGEN UR STRIP-ACNC: <2 MG/DL
WBC # BLD AUTO: 18.55 THOUSAND/UL (ref 4.31–10.16)
WBC #/AREA URNS AUTO: ABNORMAL /HPF

## 2024-02-14 PROCEDURE — 11046 DBRDMT MUSC&/FSCA EA ADDL: CPT | Performed by: PODIATRIST

## 2024-02-14 PROCEDURE — 82948 REAGENT STRIP/BLOOD GLUCOSE: CPT

## 2024-02-14 PROCEDURE — 0JBC0ZZ EXCISION OF PELVIC REGION SUBCUTANEOUS TISSUE AND FASCIA, OPEN APPROACH: ICD-10-PCS | Performed by: SURGERY

## 2024-02-14 PROCEDURE — 97605 NEG PRS WND THER DME<=50SQCM: CPT | Performed by: SURGERY

## 2024-02-14 PROCEDURE — 11045 DBRDMT SUBQ TISS EACH ADDL: CPT | Performed by: SURGERY

## 2024-02-14 PROCEDURE — 97605 NEG PRS WND THER DME<=50SQCM: CPT | Performed by: PODIATRIST

## 2024-02-14 PROCEDURE — 11043 DBRDMT MUSC&/FSCA 1ST 20/<: CPT | Performed by: PODIATRIST

## 2024-02-14 PROCEDURE — 87186 SC STD MICRODIL/AGAR DIL: CPT | Performed by: SURGERY

## 2024-02-14 PROCEDURE — 87205 SMEAR GRAM STAIN: CPT | Performed by: SURGERY

## 2024-02-14 PROCEDURE — 87081 CULTURE SCREEN ONLY: CPT | Performed by: FAMILY MEDICINE

## 2024-02-14 PROCEDURE — 11042 DBRDMT SUBQ TIS 1ST 20SQCM/<: CPT | Performed by: SURGERY

## 2024-02-14 PROCEDURE — 85027 COMPLETE CBC AUTOMATED: CPT | Performed by: INTERNAL MEDICINE

## 2024-02-14 PROCEDURE — 81001 URINALYSIS AUTO W/SCOPE: CPT | Performed by: STUDENT IN AN ORGANIZED HEALTH CARE EDUCATION/TRAINING PROGRAM

## 2024-02-14 PROCEDURE — 80048 BASIC METABOLIC PNL TOTAL CA: CPT | Performed by: INTERNAL MEDICINE

## 2024-02-14 PROCEDURE — 85610 PROTHROMBIN TIME: CPT | Performed by: INTERNAL MEDICINE

## 2024-02-14 PROCEDURE — 87077 CULTURE AEROBIC IDENTIFY: CPT | Performed by: SURGERY

## 2024-02-14 PROCEDURE — 99024 POSTOP FOLLOW-UP VISIT: CPT | Performed by: SURGERY

## 2024-02-14 PROCEDURE — 85730 THROMBOPLASTIN TIME PARTIAL: CPT | Performed by: INTERNAL MEDICINE

## 2024-02-14 PROCEDURE — 71045 X-RAY EXAM CHEST 1 VIEW: CPT

## 2024-02-14 PROCEDURE — 99232 SBSQ HOSP IP/OBS MODERATE 35: CPT | Performed by: FAMILY MEDICINE

## 2024-02-14 PROCEDURE — 84145 PROCALCITONIN (PCT): CPT | Performed by: INTERNAL MEDICINE

## 2024-02-14 PROCEDURE — 87176 TISSUE HOMOGENIZATION CULTR: CPT | Performed by: SURGERY

## 2024-02-14 PROCEDURE — 87075 CULTR BACTERIA EXCEPT BLOOD: CPT | Performed by: SURGERY

## 2024-02-14 PROCEDURE — 87070 CULTURE OTHR SPECIMN AEROBIC: CPT | Performed by: SURGERY

## 2024-02-14 RX ORDER — EPHEDRINE SULFATE 50 MG/ML
INJECTION INTRAVENOUS AS NEEDED
Status: DISCONTINUED | OUTPATIENT
Start: 2024-02-14 | End: 2024-02-14

## 2024-02-14 RX ORDER — LIDOCAINE HYDROCHLORIDE 10 MG/ML
INJECTION, SOLUTION EPIDURAL; INFILTRATION; INTRACAUDAL; PERINEURAL AS NEEDED
Status: DISCONTINUED | OUTPATIENT
Start: 2024-02-14 | End: 2024-02-14

## 2024-02-14 RX ORDER — HEPARIN SODIUM 5000 [USP'U]/ML
5000 INJECTION, SOLUTION INTRAVENOUS; SUBCUTANEOUS EVERY 8 HOURS SCHEDULED
Status: DISCONTINUED | OUTPATIENT
Start: 2024-02-14 | End: 2024-02-17

## 2024-02-14 RX ORDER — PROPOFOL 10 MG/ML
INJECTION, EMULSION INTRAVENOUS AS NEEDED
Status: DISCONTINUED | OUTPATIENT
Start: 2024-02-14 | End: 2024-02-14

## 2024-02-14 RX ORDER — LIDOCAINE HCL/PF 100 MG/5ML
SYRINGE (ML) INJECTION AS NEEDED
Status: DISCONTINUED | OUTPATIENT
Start: 2024-02-14 | End: 2024-02-14

## 2024-02-14 RX ORDER — INSULIN LISPRO 100 [IU]/ML
1-5 INJECTION, SOLUTION INTRAVENOUS; SUBCUTANEOUS
Status: DISCONTINUED | OUTPATIENT
Start: 2024-02-15 | End: 2024-02-23 | Stop reason: HOSPADM

## 2024-02-14 RX ORDER — MAGNESIUM SULFATE HEPTAHYDRATE 40 MG/ML
INJECTION, SOLUTION INTRAVENOUS AS NEEDED
Status: DISCONTINUED | OUTPATIENT
Start: 2024-02-14 | End: 2024-02-14

## 2024-02-14 RX ORDER — ALBUMIN, HUMAN INJ 5% 5 %
12.5 SOLUTION INTRAVENOUS ONCE
Status: COMPLETED | OUTPATIENT
Start: 2024-02-14 | End: 2024-02-14

## 2024-02-14 RX ORDER — ONDANSETRON 2 MG/ML
INJECTION INTRAMUSCULAR; INTRAVENOUS AS NEEDED
Status: DISCONTINUED | OUTPATIENT
Start: 2024-02-14 | End: 2024-02-14

## 2024-02-14 RX ORDER — SODIUM CHLORIDE 9 MG/ML
INJECTION, SOLUTION INTRAVENOUS AS NEEDED
Status: DISCONTINUED | OUTPATIENT
Start: 2024-02-14 | End: 2024-02-14 | Stop reason: HOSPADM

## 2024-02-14 RX ORDER — FENTANYL CITRATE 50 UG/ML
INJECTION, SOLUTION INTRAMUSCULAR; INTRAVENOUS AS NEEDED
Status: DISCONTINUED | OUTPATIENT
Start: 2024-02-14 | End: 2024-02-14

## 2024-02-14 RX ORDER — DEXAMETHASONE SODIUM PHOSPHATE 10 MG/ML
INJECTION, SOLUTION INTRAMUSCULAR; INTRAVENOUS AS NEEDED
Status: DISCONTINUED | OUTPATIENT
Start: 2024-02-14 | End: 2024-02-14

## 2024-02-14 RX ADMIN — ONDANSETRON 4 MG: 2 INJECTION INTRAMUSCULAR; INTRAVENOUS at 15:44

## 2024-02-14 RX ADMIN — LIDOCAINE HYDROCHLORIDE 5 ML: 20 INJECTION INTRAVENOUS at 15:18

## 2024-02-14 RX ADMIN — VANCOMYCIN HYDROCHLORIDE 750 MG: 750 INJECTION, SOLUTION INTRAVENOUS at 23:43

## 2024-02-14 RX ADMIN — PROPOFOL 120 MG: 10 INJECTION, EMULSION INTRAVENOUS at 15:15

## 2024-02-14 RX ADMIN — OMEGA-3 FATTY ACIDS CAP 1000 MG 1000 MG: 1000 CAP at 08:49

## 2024-02-14 RX ADMIN — ATORVASTATIN CALCIUM 10 MG: 10 TABLET, FILM COATED ORAL at 17:50

## 2024-02-14 RX ADMIN — ACETAMINOPHEN 975 MG: 325 TABLET, FILM COATED ORAL at 22:17

## 2024-02-14 RX ADMIN — METHOCARBAMOL 250 MG: 500 TABLET ORAL at 13:00

## 2024-02-14 RX ADMIN — DOCUSATE SODIUM 100 MG: 100 CAPSULE, LIQUID FILLED ORAL at 17:50

## 2024-02-14 RX ADMIN — VANCOMYCIN HYDROCHLORIDE 750 MG: 750 INJECTION, SOLUTION INTRAVENOUS at 11:29

## 2024-02-14 RX ADMIN — DEXAMETHASONE SODIUM PHOSPHATE 5 MG: 10 INJECTION, SOLUTION INTRAMUSCULAR; INTRAVENOUS at 15:44

## 2024-02-14 RX ADMIN — CEFEPIME 1000 MG: 1 INJECTION, POWDER, FOR SOLUTION INTRAMUSCULAR; INTRAVENOUS at 18:03

## 2024-02-14 RX ADMIN — LIDOCAINE HYDROCHLORIDE 5 ML: 10 INJECTION, SOLUTION EPIDURAL; INFILTRATION; INTRACAUDAL; PERINEURAL at 15:15

## 2024-02-14 RX ADMIN — PREGABALIN 200 MG: 100 CAPSULE ORAL at 08:49

## 2024-02-14 RX ADMIN — CLONAZEPAM 1 MG: 1 TABLET ORAL at 17:50

## 2024-02-14 RX ADMIN — MAGNESIUM SULFATE HEPTAHYDRATE 2 G: 40 INJECTION, SOLUTION INTRAVENOUS at 15:23

## 2024-02-14 RX ADMIN — BUPROPION HYDROCHLORIDE 300 MG: 150 TABLET, EXTENDED RELEASE ORAL at 08:49

## 2024-02-14 RX ADMIN — SODIUM CHLORIDE 75 ML/HR: 0.9 INJECTION, SOLUTION INTRAVENOUS at 16:57

## 2024-02-14 RX ADMIN — OXYBUTYNIN CHLORIDE 5 MG: 5 TABLET, EXTENDED RELEASE ORAL at 08:49

## 2024-02-14 RX ADMIN — ACETAMINOPHEN 650 MG: 325 TABLET, FILM COATED ORAL at 09:28

## 2024-02-14 RX ADMIN — SODIUM CHLORIDE 75 ML/HR: 0.9 INJECTION, SOLUTION INTRAVENOUS at 00:16

## 2024-02-14 RX ADMIN — PREGABALIN 200 MG: 100 CAPSULE ORAL at 22:16

## 2024-02-14 RX ADMIN — HEPARIN SODIUM 5000 UNITS: 5000 INJECTION INTRAVENOUS; SUBCUTANEOUS at 13:00

## 2024-02-14 RX ADMIN — CEFEPIME 1000 MG: 1 INJECTION, POWDER, FOR SOLUTION INTRAMUSCULAR; INTRAVENOUS at 05:32

## 2024-02-14 RX ADMIN — NICOTINE 1 PATCH: 14 PATCH, EXTENDED RELEASE TRANSDERMAL at 08:50

## 2024-02-14 RX ADMIN — DOCUSATE SODIUM 100 MG: 100 CAPSULE, LIQUID FILLED ORAL at 08:49

## 2024-02-14 RX ADMIN — VANCOMYCIN HYDROCHLORIDE 750 MG: 750 INJECTION, SOLUTION INTRAVENOUS at 00:19

## 2024-02-14 RX ADMIN — ASPIRIN 81 MG: 81 TABLET, COATED ORAL at 08:49

## 2024-02-14 RX ADMIN — HEPARIN SODIUM 5000 UNITS: 5000 INJECTION INTRAVENOUS; SUBCUTANEOUS at 22:16

## 2024-02-14 RX ADMIN — METHOCARBAMOL 250 MG: 500 TABLET ORAL at 22:16

## 2024-02-14 RX ADMIN — PANTOPRAZOLE SODIUM 40 MG: 40 TABLET, DELAYED RELEASE ORAL at 08:49

## 2024-02-14 RX ADMIN — FENTANYL CITRATE 50 MCG: 50 INJECTION INTRAMUSCULAR; INTRAVENOUS at 15:15

## 2024-02-14 RX ADMIN — ALBUMIN (HUMAN) 12.5 G: 12.5 INJECTION, SOLUTION INTRAVENOUS at 17:15

## 2024-02-14 RX ADMIN — PHENYLEPHRINE HYDROCHLORIDE 40 MCG/MIN: 10 INJECTION INTRAVENOUS at 15:18

## 2024-02-14 RX ADMIN — SODIUM CHLORIDE 75 ML/HR: 0.9 INJECTION, SOLUTION INTRAVENOUS at 06:50

## 2024-02-14 RX ADMIN — ALBUMIN (HUMAN) 12.5 G: 12.5 INJECTION, SOLUTION INTRAVENOUS at 16:48

## 2024-02-14 RX ADMIN — EPHEDRINE SULFATE 50 MG: 50 INJECTION, SOLUTION INTRAVENOUS at 16:11

## 2024-02-14 RX ADMIN — ACETAMINOPHEN 975 MG: 325 TABLET, FILM COATED ORAL at 12:59

## 2024-02-14 NOTE — ASSESSMENT & PLAN NOTE
Fever/leukocytosis at [facility] prior to arrival here, additionally with lactic acidosis resolved following IV fluids  Concern for surgical site infection at groin, plan as per primary problem with potential OR with vascular surgery for washout 2/14/2024  Received cefepime/vancomycin at [facility] prior to transfer to medical floor, continue for now as above and follow-up results of blood cultures x 2  Trend procalcitonin, WBC, temperature curve, hemodynamics.  Supportive cares otherwise as appropriate.

## 2024-02-14 NOTE — PROGRESS NOTES
Lona Cedeno is a 78 y.o. female who is currently ordered Vancomycin IV with management by the Pharmacy Consult service.  Relevant clinical data and objective / subjective history reviewed.  Vancomycin Assessment:  Indication and Goal AUC/Trough: Soft tissue (goal -600, trough >10)  Clinical Status: stable  Micro:   pending  Renal Function:  SCr: 0.59 mg/dL  CrCl: 60.4 mL/min  Renal replacement: Not on dialysis  Days of Therapy: 1  Current Dose: vancomycin 1000 mg once  Vancomycin Plan:  New Dosing: vancomycin 750 mg q12 hours  Estimated AUC: 422 mcg*hr/mL  Estimated Trough: 13.3 mcg/mL  Next Level: 02/15/24 at 0600  Renal Function Monitoring: Daily BMP and UOP  Pharmacy will continue to follow closely for s/sx of nephrotoxicity, infusion reactions and appropriateness of therapy.  BMP and CBC will be ordered per protocol. We will continue to follow the patient’s culture results and clinical progress daily.    Shanna Baugh, Pharmacist

## 2024-02-14 NOTE — NURSING NOTE
Patient had a change in mental status with increase impulsiveness and confusion and  hyperpyrexia. Patient was seen by vascular they recommend wash out of groin incision. CT cancelled per Dr. Meier Patient transferred to acute P8 (room 815).

## 2024-02-14 NOTE — H&P
[facility]  H&P  Name: Lona Cedeno 78 y.o. female I MRN: [id number]  Unit/Bed#: PPHP 815-01 I Date of Admission: 2/13/2024   Date of Service: 2/14/2024 I Hospital Day: 1      Assessment/Plan   * Surgical site infection  Assessment & Plan  Pt noted to have fever and leukocytosis today, evaluated by vascular surgery with suspicion for a surgical site infection of her groin  Continue cefepime and will add vancomycin  Consult pharmacy for vancomycin dosing  Possible OR for washout  Await cultures drawn at [facility]    Sepsis without acute organ dysfunction (HCC)  Assessment & Plan  Fever/leukocytosis at [facility] prior to arrival here, additionally with lactic acidosis resolved following IV fluids  Concern for surgical site infection at groin, plan as per primary problem with potential OR with vascular surgery for washout 2/14/2024  Received cefepime/vancomycin at [facility] prior to transfer to medical floor, continue for now as above and follow-up results of blood cultures x 2  Trend procalcitonin, WBC, temperature curve, hemodynamics.  Supportive cares otherwise as appropriate.    Diabetic foot ulcer with osteomyelitis ([facility])  Assessment & Plan  [place] wound care per podiatry  For a wound vac change tomorrow  Prevalon boot      COPD, severity to be determined ([facility])  Assessment & Plan  Respiratory status is stable  Continue maintenance inhalers and PRN bronchodilators    PAD (peripheral artery disease) ([facility])  Assessment & Plan  1/31 BYPASS AXILLO-FEMORAL, RIGHT WITH 8MM RINGED PTFE GRAFT   Continue aspirin, statin    Essential hypertension  Assessment & Plan  BP is accptable  Holding amiloride, can restart later is indicated    Type 2 diabetes mellitus with diabetic polyneuropathy ([facility])  Assessment & Plan  Lab Results   Component Value Date    HGBA1C 6.5 (H) 11/07/2023       Recent Labs     02/13/24  0617 02/13/24  1032 02/13/24  1608 02/13/24 2036   POCGLU 144* 118 122 106       Blood Sugar  Average: Last 72 hrs:  hold metformin while inpatient  Diabetic diet  Fingersticks QID with sliding scale coverage           VTE Pharmacologic Prophylaxis:   Moderate Risk (Score 3-4) - Pharmacological DVT Prophylaxis Ordered: rivaroxaban (Xarelto).  Code Status: Level 3 - DNAR and DNI   Discussion with family: Updated  (daughter) via phone.    Anticipated Length of Stay: Patient will be admitted on an inpatient basis with an anticipated length of stay of greater than 2 midnights secondary to surgical site infection.    Total Time Spent on Date of Encounter in care of patient: 45 mins. This time was spent on one or more of the following: performing physical exam; counseling and coordination of care; obtaining or reviewing history; documenting in the medical record; reviewing/ordering tests, medications or procedures; communicating with other healthcare professionals and discussing with patient's family/caregivers.    Chief Complaint: fever    History of Present Illness:  Lona Cedeno is a 78 y.o. female with a PMH of PAD, DM foot ulceration who presents with fever. She was in [facility] for rehabiltiation after her BYPASS AXILLO-FEMORAL, RIGHT WITH 8MM RINGED PTFE GRAFT. She was noted to have leukocytosis and fever today. Cultures were sent and cefepime was started. [facility] was notified due to elevated serum lactate. Vascular surgery was contacted for an incision check and there was concern for infection and she was recommended to readmit to [facility] for management.     Currently patient is lying in bed in no acute distress and comfortable appearing.  Offers no acute complaints at this time however somewhat confused to situation and patient admits she does not recall the events of this evening that led to transfer back to medical floor.    Review of Systems:  Review of Systems   Constitutional:  Positive for fever. Negative for chills.   HENT:  Negative for ear pain and sore throat.    Eyes:  Negative for pain and  visual disturbance.   Respiratory:  Negative for cough and shortness of breath.    Cardiovascular:  Negative for chest pain and palpitations.   Gastrointestinal:  Negative for abdominal pain and vomiting.   Genitourinary:  Negative for dysuria and hematuria.   Musculoskeletal:  Negative for arthralgias and back pain.   Skin:  Positive for wound. Negative for color change and rash.   Neurological:  Negative for seizures and syncope.   Psychiatric/Behavioral:  Positive for confusion.    All other systems reviewed and are negative.      Past Medical and Surgical History:   Past Medical History:   Diagnosis Date    Anxiety     Closed fracture of coccyx (HCC) 2023    Diabetes mellitus (HCC)     GERD (gastroesophageal reflux disease)     Hyperlipidemia     Hypertension     IBS (irritable bowel syndrome)     Shoulder fracture        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;  Surgeon: Cristian Child MD;  Location: [facility];  Service: Urology    EVACUATION OF HEMATOMA Right 2024    Procedure: EVACUATION/ DRAINAGE CHEST WALL  HEMATOMA;  Surgeon: Seth Hoyos MD;  Location: [facility];  Service: Vascular    FL RETROGRADE PYELOGRAM  2023    HERNIA REPAIR      umbilicus w/ mesh    MS BYPASS W/VEIN AXILLARY-FEMORAL Right 2024    Procedure: BYPASS AXILLO-FEMORAL, RIGHT WITH 8MM RINGED PTFE GRAFT;  Surgeon: Seth Hoyos MD;  Location: [facility];  Service: Vascular    WOUND DEBRIDEMENT Right 2024     Procedure: RIGHT WOUND AND ACHILLES DEBRIDEMENT FOOT/TOE (WASH OUT); PARTIAL AMPUTATION DISTAL 2ND TOE;  Surgeon: Aaron Montero DPM;  Location: [facility];  Service: Podiatry       Meds/Allergies:  Prior to Admission medications    Medication Sig Start Date End Date Taking? Authorizing Provider   acetaminophen (TYLENOL) 325 mg tablet Take 650 mg by mouth every 6 (six) hours as needed for mild pain    Historical Provider, MD   albuterol (Ventolin HFA) 90 mcg/act inhaler Inhale 2 puffs every 6 (six) hours as needed for wheezing 11/10/23   Vivek Preston DO   AMILoride 5 mg tablet Take 1 tablet (5 mg total) by mouth daily 10/10/23   Alexander Cespedes MD   Apoaequorin (Prevagen) 10 MG CAPS Take by mouth    Historical Provider, MD   Aspirin (ASPIR-81 PO) take one by mouth daily 1/14/14   Historical Provider, MD   atorvastatin (LIPITOR) 10 mg tablet Take 1 tablet (10 mg total) by mouth daily with dinner 9/27/23   Vivek Preston DO   Blood Glucose Monitoring Suppl (ONETOUCH VERIO) w/Device KIT by Does not apply route 2 (two) times a day 1/7/20   Vivek Preston DO   buPROPion (WELLBUTRIN XL) 300 mg 24 hr tablet Take 1 tablet (300 mg total) by mouth daily 11/28/23   Vivek Preston DO   clonazePAM (KlonoPIN) 1 mg tablet Take 1 tablet (1 mg total) by mouth every evening 1/25/24   Vivek Preston DO   Fluticasone-Salmeterol (Advair Diskus) 250-50 mcg/dose inhaler Inhale 1 puff 2 (two) times a day Rinse mouth after use. 11/10/23 12/10/23  Vivek Preston DO   glucose blood (OneTouch Verio) test strip TEST BLOOD GLUCOSE TWO TIMES A DAY 12/8/23   Vivek Preston DO   Lancets (OneTouch Delica Plus [id number]) MISC TEST TWO TIMES A DAY 12/8/23   Vivek Preston DO   methocarbamol (ROBAXIN) 500 mg tablet Take 0.5 tablets (250 mg total) by mouth every 8 (eight) hours 2/9/24   Ofe Wood MD   Misc. Devices (Carex Coccyx Cushion) MISC Use in the morning 5/24/23   ROXANE Garcia   nicotine (NICODERM CQ) 21  mg/24 hr TD 24 hr patch Place 1 patch on the skin over 24 hours daily 2/10/24   Ofe Wood MD   Omega-3 Fatty Acids (FISH OIL PO)  3/24/14   Historical Provider, MD   pantoprazole (PROTONIX) 40 mg tablet 40 mg 10/31/23   Historical Provider, MD   potassium chloride (K-DUR,KLOR-CON) 10 mEq tablet Take 1 tablet (10 mEq total) by mouth 2 (two) times a day 11/28/23   Vivek Preston DO   pregabalin (LYRICA) 200 MG capsule TAKE ONE CAPSULE BY MOUTH THREE TIMES A DAY 8/21/23   Vivek Preston DO   rivaroxaban (Xarelto) 2.5 mg tablet Take 1 tablet (2.5 mg total) by mouth 2 (two) times a day 2/1/24   Felipa Foy PA-C   solifenacin (VESICARE) 5 mg tablet Take 1 tablet (5 mg total) by mouth daily 11/28/23   Vivek Preston DO     I have reviewed home medications using recent Epic encounter.    Allergies:   Allergies   Allergen Reactions    Paroxetine Other (See Comments)     Became suicidal    Adhesive [Medical Tape] Itching and Blisters    Carafate [Sucralfate] Other (See Comments)     Mouth sores, tongue sore    Sulfa Antibiotics Hives and Rash    Sulfamethoxazole-Trimethoprim Hives       Social History:  Marital Status: /Civil Union   Occupation: retired  Patient Pre-hospital Living Situation: Home  Patient Pre-hospital Level of Mobility: walks  Patient Pre-hospital Diet Restrictions: None  Substance Use History:   Social History     Substance and Sexual Activity   Alcohol Use Not Currently     Social History     Tobacco Use   Smoking Status Every Day    Current packs/day: 1.00    Average packs/day: 1 pack/day for 63.1 years (63.1 ttl pk-yrs)    Types: Cigarettes    Start date: 1961   Smokeless Tobacco Never   Tobacco Comments    11/14/23 smokes about 3/4 PPD     Social History     Substance and Sexual Activity   Drug Use Never       Family History:  Family History   Problem Relation Age of Onset    COPD Mother     Emphysema Mother     COPD Father     Emphysema Father        Physical Exam:  "    Vitals:   Blood Pressure: 102/53 (02/13/24 2349)  Pulse: 85 (02/13/24 2349)  Temperature: (!) 100.6 °F (38.1 °C) (02/13/24 2349)  Respirations: 20 (02/13/24 2349)  Height: 5' 1\" (154.9 cm) (02/13/24 2349)  Weight - Scale: 68.9 kg (151 lb 14.4 oz) (02/13/24 2349)  SpO2: 92 % (02/13/24 2349)    Physical Exam  Vitals reviewed.   Constitutional:       General: She is not in acute distress.     Appearance: Normal appearance. She is not toxic-appearing.   HENT:      Head: Normocephalic and atraumatic.      Nose: Nose normal.   Eyes:      Extraocular Movements: Extraocular movements intact.   Cardiovascular:      Rate and Rhythm: Normal rate and regular rhythm.   Pulmonary:      Effort: Pulmonary effort is normal.      Breath sounds: No wheezing or rales.   Abdominal:      General: There is no distension.      Palpations: Abdomen is soft.      Tenderness: There is no abdominal tenderness.   Musculoskeletal:      Right lower leg: No edema.      Left lower leg: No edema.      Comments: Prevalon boot and wound vac  Dressings are clean and dry   Skin:     General: Skin is warm and dry.      Comments: Right subclavian surgical site with mild swelling, incision otherwise well-healing without surrounding erythema or warmth  Right groin incision with superficial skin sloughing without purulent drainage, surrounding erythema/warmth/tenderness   Neurological:      General: No focal deficit present.      Mental Status: She is alert and oriented to person, place, and time.   Psychiatric:         Mood and Affect: Mood normal.         Behavior: Behavior normal.      Comments: Answers questions appropriately however somewhat confused to situation, does not recall events of this evening          Additional Data:     Lab Results:  Results from last 7 days   Lab Units 02/13/24  0453   WBC Thousand/uL 14.83*   HEMOGLOBIN g/dL 10.4*   HEMATOCRIT % 32.8*   PLATELETS Thousands/uL 404*   NEUTROS PCT % 76*   LYMPHS PCT % 12*   MONOS PCT % 7 "   EOS PCT % 5     Results from last 7 days   Lab Units 02/13/24  0453   SODIUM mmol/L 137   POTASSIUM mmol/L 4.4   CHLORIDE mmol/L 101   CO2 mmol/L 30   BUN mg/dL 25   CREATININE mg/dL 0.59*   ANION GAP mmol/L 6   CALCIUM mg/dL 9.5   GLUCOSE RANDOM mg/dL 157*         Results from last 7 days   Lab Units 02/13/24  2036 02/13/24  1608 02/13/24  1032 02/13/24  0617 02/12/24 2127 02/12/24  1724 02/12/24  1047 02/12/24  0611 02/11/24  2041 02/11/24  1529 02/11/24  1041 02/11/24  0617   POC GLUCOSE mg/dl 106 122 118 144* 135 139 125 153* 127 184* 122 128         Results from last 7 days   Lab Units 02/13/24 2129 02/13/24  1750 02/13/24  1427   LACTIC ACID mmol/L 1.7 2.3*  --    PROCALCITONIN ng/ml  --   --  0.06       Lines/Drains:  Invasive Devices       Peripheral Intravenous Line  Duration             Peripheral IV 02/13/24 Left;Ventral (anterior) Forearm <1 day              Drain  Duration             Ureteral Internal Stent Left ureter 6 Fr. 85 days                        Imaging: No pertinent imaging reviewed.  No orders to display       EKG and Other Studies Reviewed on Admission:   EKG: No EKG obtained.    ** Please Note: This note has been constructed using a voice recognition system. **

## 2024-02-14 NOTE — PLAN OF CARE
Problem: Prexisting or High Potential for Compromised Skin Integrity  Goal: Skin integrity is maintained or improved  Description: INTERVENTIONS:  - Identify patients at risk for skin breakdown  - Assess and monitor skin integrity  - Assess and monitor nutrition and hydration status  - Monitor labs   - Assess for incontinence   - Turn and reposition patient  - Assist with mobility/ambulation  - Relieve pressure over bony prominences  - Avoid friction and shearing  - Provide appropriate hygiene as needed including keeping skin clean and dry  - Evaluate need for skin moisturizer/barrier cream  - Collaborate with interdisciplinary team   - Patient/family teaching  - Consider wound care consult   Outcome: Progressing     Problem: SKIN/TISSUE INTEGRITY - ADULT  Goal: Incision(s), wounds(s) or drain site(s) healing without S/S of infection  Description: INTERVENTIONS  - Assess and document dressing, incision, wound bed, drain sites and surrounding tissue  - Provide patient and family education  - Perform skin care/dressing changes every shift  Outcome: Progressing     Problem: INFECTION - ADULT  Goal: Absence or prevention of progression during hospitalization  Description: INTERVENTIONS:  - Assess and monitor for signs and symptoms of infection  - Monitor lab/diagnostic results  - Monitor all insertion sites, i.e. indwelling lines, tubes, and drains  - Monitor endotracheal if appropriate and nasal secretions for changes in amount and color  - [place] appropriate cooling/warming therapies per order  - Administer medications as ordered  - Instruct and encourage patient and family to use good hand hygiene technique  - Identify and instruct in appropriate isolation precautions for identified infection/condition  Outcome: Progressing

## 2024-02-14 NOTE — PROGRESS NOTES
"Progress Note - Vascular Surgery     Assessment/Plan:  78 year old female s/p right axillary to fem bypass with PTFE, febrile, altered mental status, elevated LA-questionable related to metformin v. Early sepsis.  Recommend readmission to medicine for sepsis workup and treatment.  Chest wall incision c/d/I with no drainage or signs of infection.  R groin wound with some superficial sloughing.  Will schedule OR washout and new wound vac shala. Recommend IV ABX.    Subjective:  Patient altered and febrile.    Vitals:  /64 (BP Location: Right arm)   Pulse 80   Temp 98.4 °F (36.9 °C) (Oral)   Resp 17   Ht 5' 1\" (1.549 m)   Wt 69.5 kg (153 lb 3.5 oz)   SpO2 90%   BMI 28.95 kg/m²     I/Os:  I/O last 3 completed shifts:  In: 900 [P.O.:900]  Out: 3150 [Urine:3150]  I/O this shift:  In: -   Out: 500 [Urine:500]    Lab Results and Cultures:   Lab Results   Component Value Date    WBC 14.83 (H) 02/13/2024    HGB 10.4 (L) 02/13/2024    HCT 32.8 (L) 02/13/2024    MCV 97 02/13/2024     (H) 02/13/2024     Lab Results   Component Value Date    GLUCOSE 143 (H) 01/31/2024    CALCIUM 9.5 02/13/2024     05/21/2018    K 4.4 02/13/2024    CO2 30 02/13/2024     02/13/2024    BUN 25 02/13/2024    CREATININE 0.59 (L) 02/13/2024     Lab Results   Component Value Date    INR 1.03 02/04/2024    INR 1.11 02/03/2024    INR 1.03 01/23/2024    PROTIME 13.4 02/04/2024    PROTIME 14.2 02/03/2024    PROTIME 13.6 01/23/2024        Blood Culture:   Lab Results   Component Value Date    BLOODCX No Growth After 5 Days. 01/23/2024   ,   Urinalysis:   Lab Results   Component Value Date    COLORU Yellow 11/20/2023    COLORU YELLOW 05/21/2018    CLARITYU Clear 11/20/2023    CLARITYU CLEAR 05/21/2018    SPECGRAV 1.010 11/20/2023    SPECGRAV 1.015 05/21/2018    PHUR 5.0 11/20/2023    PHUR 6.0 05/21/2018    LEUKOCYTESUR Negative 11/20/2023    LEUKOCYTESUR NEGATIVE 05/21/2018    NITRITE Negative 11/20/2023    NITRITE NEGATIVE " 05/21/2018    PROTEINUA NEGATIVE 05/21/2018    GLUCOSEU Negative 11/20/2023    GLUCOSEU NEGATIVE 05/21/2018    KETONESU Negative 11/20/2023    KETONESU NEGATIVE 05/21/2018    BILIRUBINUR Negative 11/20/2023    BILIRUBINUR NEGATIVE 05/21/2018    BLOODU Negative 11/20/2023    BLOODU NEGATIVE 05/21/2018   ,   Urine Culture:   Lab Results   Component Value Date    URINECX No Growth <1000 cfu/mL 11/20/2023   ,   Wound Culure:   Lab Results   Component Value Date    WOUNDCULT 1+ Growth of 01/23/2024       Medications:  Current Facility-Administered Medications   Medication Dose Route Frequency    acetaminophen (TYLENOL) tablet 975 mg  975 mg Oral Q8H JILLIAN    albuterol (PROVENTIL HFA,VENTOLIN HFA) inhaler 2 puff  2 puff Inhalation Q6H PRN    aspirin (ECOTRIN LOW STRENGTH) EC tablet 81 mg  81 mg Oral Daily    atorvastatin (LIPITOR) tablet 10 mg  10 mg Oral Daily With Dinner    bisacodyl (DULCOLAX) rectal suppository 10 mg  10 mg Rectal Daily    buPROPion (WELLBUTRIN XL) 24 hr tablet 300 mg  300 mg Oral Daily    ceFEPime (MAXIPIME) 2,000 mg in dextrose 5 % 50 mL IVPB  2,000 mg Intravenous Q12H    clonazePAM (KlonoPIN) tablet 1 mg  1 mg Oral QPM    docusate sodium (COLACE) capsule 100 mg  100 mg Oral BID    fish oil capsule 1,000 mg  1,000 mg Oral Daily    fluticasone-vilanterol 200-25 mcg/actuation 1 puff  1 puff Inhalation Daily    insulin lispro (HumaLOG) 100 units/mL subcutaneous injection 1-5 Units  1-5 Units Subcutaneous TID AC    insulin lispro (HumaLOG) 100 units/mL subcutaneous injection 1-5 Units  1-5 Units Subcutaneous HS    methocarbamol (ROBAXIN) tablet 250 mg  250 mg Oral Q8H JILLIAN    nicotine (NICODERM CQ) 21 mg/24 hr TD 24 hr patch 1 patch  1 patch Transdermal Daily    ondansetron (ZOFRAN-ODT) dispersible tablet 4 mg  4 mg Oral Q6H PRN    oxybutynin (DITROPAN-XL) 24 hr tablet 5 mg  5 mg Oral Daily    oxyCODONE (ROXICODONE) split tablet 2.5 mg  2.5 mg Oral Q4H PRN    Or    oxyCODONE (ROXICODONE) IR tablet 5 mg  5  mg Oral Q4H PRN    pantoprazole (PROTONIX) EC tablet 40 mg  40 mg Oral Early Morning    polyethylene glycol (MIRALAX) packet 17 g  17 g Oral Daily    pregabalin (LYRICA) capsule 200 mg  200 mg Oral TID    rivaroxaban (XARELTO) tablet 2.5 mg  2.5 mg Oral BID With Meals    senna (SENOKOT) tablet 17.2 mg  2 tablet Oral HS    sodium chloride 0.9 % infusion  75 mL/hr Intravenous Continuous       Imaging:  N/a    Physical Exam:    General appearance: confused, oriented to self  Skin: warm to touch  Neurologic: Grossly normal  Head: Normocephalic, without obvious abnormality, atraumatic  Eyes: EOMI  Lungs: No resp distress  Chest wall: no tenderness  Heart: reg rate  Abdomen: soft, NT  Extremities: warm, wounds as pictured    Wound/Incision:  Right subclavian incision: dressing c/d/I, mild swelling, no erythema or warmth   R groin incision: superficial skin sloughing, no purulent drainage    Patricia Loza PA-C  2/13/2024

## 2024-02-14 NOTE — PROGRESS NOTES
[facility]  Progress Note  Name: Lona Cedeno I  MRN: [id number]  Unit/Bed#: [facility] 815-01 I Date of Admission: 2/13/2024   Date of Service: 2/14/2024 I Hospital Day: 1    Assessment/Plan   Sepsis without acute organ dysfunction (HCC)  Assessment & Plan  Fever/leukocytosis at [facility] prior to arrival here, additionally with lactic acidosis resolved following IV fluids  Concern for surgical site infection at groin, plan as per primary problem with potential OR with vascular surgery for washout 2/14/2024  Received cefepime/vancomycin at [facility] prior to transfer to medical floor, continue for now as above and follow-up results of blood cultures x 2  Trend procalcitonin, WBC, temperature curve, hemodynamics.  Supportive cares otherwise as appropriate.    * Surgical site infection  Assessment & Plan  Pt noted to have fever and leukocytosis today, evaluated by vascular surgery with suspicion for a surgical site infection of her groin  Continue cefepime and will add vancomycin  Check MRSA screen   Vascular input appreciated, for washout this afternoon 2/14/24  Await cultures drawn at [facility]    Diabetic foot ulcer with osteomyelitis (HCC)  Assessment & Plan  Podiatry assistance appreciated  Wound vac MWF       COPD, severity to be determined (HCC)  Assessment & Plan  Respiratory status is stable  Continue maintenance inhalers and PRN bronchodilators    Type 2 diabetes mellitus with diabetic polyneuropathy (HCC)  Assessment & Plan  Lab Results   Component Value Date    HGBA1C 6.5 (H) 11/07/2023       Recent Labs     02/13/24  1608 02/13/24  2036 02/14/24  0015 02/14/24  0532   POCGLU 122 106 179* 100         Blood Sugar Average: Last 72 hrs:  (P) 139.5  hold metformin while inpatient  Diabetic diet  Fingersticks QID with sliding scale coverage               Progress Note - Lona Cedeno 78 y.o. female MRN: [id number]    [facility] Encounter: [id number]        Subjective:   Patient seen  "and examined at bedside  Offers no acute complaints other than pain at incision   No nausea/vomiting/sob    Objective:     Vitals:   Vitals:    02/14/24 0810   BP: 101/52   Pulse: 94   Resp: 18   Temp: (!) 102.3 °F (39.1 °C)   SpO2: 95%     Body mass index is 28.7 kg/m².    Intake/Output Summary (Last 24 hours) at 2/14/2024 1056  Last data filed at 2/14/2024 0649  Gross per 24 hour   Intake 578.75 ml   Output 2050 ml   Net -1471.25 ml       Physical Exam:   /52   Pulse 94   Temp (!) 102.3 °F (39.1 °C) (Oral)   Resp 18   Ht 5' 1\" (1.549 m)   Wt 68.9 kg (151 lb 14.4 oz)   SpO2 95%   BMI 28.70 kg/m²   General appearance: alert and oriented, in no acute distress  Lungs: clear to auscultation bilaterally  Chest Right chest wall: incision C/D/I  Groin: mild erythema, sloughing   Heart: regular rate and rhythm, S1, S2 normal, no murmur, click, rub or gallop  Abdomen: soft, non-tender; bowel sounds normal; no masses,  no organomegaly  Extremities: extremities normal, warm and well-perfused; no cyanosis, clubbing, or edema  Neurologic: Grossly normal     Invasive Devices       Peripheral Intravenous Line  Duration             Peripheral IV 02/13/24 Left;Ventral (anterior) Forearm <1 day              Drain  Duration             Ureteral Internal Stent Left ureter 6 Fr. 86 days                    Results from last 7 days   Lab Units 02/14/24 0446 02/13/24 0453 02/09/24  0540   WBC Thousand/uL 18.55* 14.83* 9.90   HEMOGLOBIN g/dL 11.0* 10.4* 10.3*   HEMATOCRIT % 33.9* 32.8* 32.3*   PLATELETS Thousands/uL 391* 404* 367       Results from last 7 days   Lab Units 02/14/24 0446 02/13/24 0453 02/09/24  0540   POTASSIUM mmol/L 3.8 4.4 4.4   CHLORIDE mmol/L 99 101 100   CO2 mmol/L 31 30 30   BUN mg/dL 21 25 14   CREATININE mg/dL 0.67 0.59* 0.59*   CALCIUM mg/dL 10.0 9.5 10.0       Medication Administration - last 24 hours from 02/13/2024 1056 to 02/14/2024 1056         Date/Time Order Dose Route Action Action by     " 02/14/2024 0928 EST acetaminophen (TYLENOL) tablet 650 mg 650 mg Oral Given Aleida Ibarra RN     02/14/2024 0850 EST nicotine (NICODERM CQ) 14 mg/24hr TD 24 hr patch 1 patch 1 patch Transdermal Medication Applied Aleida Ibarra RN     02/14/2024 0849 EST docusate sodium (COLACE) capsule 100 mg 100 mg Oral Given Aleida Ibarra RN     02/14/2024 0602 EST cefepime (MAXIPIME) 1,000 mg in dextrose 5 % 50 mL IVPB 0 mg Intravenous Stopped Sara Hernández RN     02/14/2024 0532 EST cefepime (MAXIPIME) 1,000 mg in dextrose 5 % 50 mL IVPB 1,000 mg Intravenous New Bag Sara Hernández RN     02/14/2024 0119 EST vancomycin (VANCOCIN) IVPB (premix in dextrose) 750 mg 150 mL 0 mg Intravenous Stopped Sara Hernández RN     02/14/2024 0019 EST vancomycin (VANCOCIN) IVPB (premix in dextrose) 750 mg 150 mL 750 mg Intravenous New Bag Sara Hernández RN     02/14/2024 0539 EST insulin lispro (HumaLOG) 100 units/mL subcutaneous injection 1-5 Units -- Subcutaneous Not Given Sara Hernández RN     02/14/2024 0155 EST insulin lispro (HumaLOG) 100 units/mL subcutaneous injection 1-5 Units -- Subcutaneous Not Given Sara Hernández RN     02/14/2024 0539 EST acetaminophen (TYLENOL) tablet 975 mg 975 mg Oral Not Given Sara Hernández RN     02/14/2024 0849 EST aspirin (ECOTRIN LOW STRENGTH) EC tablet 81 mg 81 mg Oral Given Aleida Ibarra RN     02/14/2024 0849 EST buPROPion (WELLBUTRIN XL) 24 hr tablet 300 mg 300 mg Oral Given Aleida Ibarra RN     02/14/2024 0849 EST fish oil capsule 1,000 mg 1,000 mg Oral Given Aleida Ibarra RN     02/14/2024 0539 EST methocarbamol (ROBAXIN) tablet 250 mg 250 mg Oral Not Given Sara Hernández RN     02/14/2024 0849 EST oxybutynin (DITROPAN-XL) 24 hr tablet 5 mg 5 mg Oral Given Aleida Ibarra RN     02/14/2024 0849 EST pantoprazole (PROTONIX) EC tablet 40 mg 40 mg Oral Given Aleida Ibarra RN     02/14/2024 0849 EST pregabalin (LYRICA) capsule 200 mg 200 mg Oral Given Aleida Ibarra RN     02/14/2024 0650 EST sodium  chloride 0.9 % infusion 75 mL/hr Intravenous New Bag Sara Hernández, PAULINA     02/14/2024 0649 EST sodium chloride 0.9 % infusion 0 mL/hr Intravenous Stopped Sara Hernández, PAULINA     02/14/2024 0602 EST sodium chloride 0.9 % infusion 75 mL/hr Intravenous Restarted Sara Hernández, PAULINA     02/14/2024 0532 EST sodium chloride 0.9 % infusion 0 mL/hr Intravenous Stopped Sara Hernández, PAULINA     02/14/2024 0119 EST sodium chloride 0.9 % infusion 75 mL/hr Intravenous Restarted Sara Hernández RN     02/14/2024 0019 EST sodium chloride 0.9 % infusion 0 mL/hr Intravenous Stopped Sara Hernández RN     02/14/2024 0016 EST sodium chloride 0.9 % infusion 75 mL/hr Intravenous New Bag Sara Hernández RN              Lab, Imaging and other studies: I have personally reviewed pertinent reports.    VTE Pharmacologic Prophylaxis: Heparin  VTE Mechanical Prophylaxis: sequential compression device     Donald Grimm MD  2/14/2024,10:56 AM

## 2024-02-14 NOTE — ANESTHESIA POSTPROCEDURE EVALUATION
Post-Op Assessment Note    CV Status:  Stable  Pain Score: 0    Pain management: adequate       Mental Status:  Sleepy   Hydration Status:  Stable   PONV Controlled:  None   Airway Patency:  Patent     Post Op Vitals Reviewed: Yes    No anethesia notable event occurred.    Staff: Anesthesiologist               BP   87/47   Temp   97.2   Pulse  70   Resp   27   SpO2   30

## 2024-02-14 NOTE — PROGRESS NOTES
"[facility]  Progress Note  Name: Lona Cedeno I  MRN: [id number]  Unit/Bed#: [facility] 815-01 I Date of Admission: 2/13/2024   Date of Service: 2/14/2024 I Hospital Day: 1    Assessment/Plan   PAD (peripheral artery disease) ([facility])  Assessment & Plan  77 y/o F w/ history of smoking, HTN, T2DM, GERD, COPD, R ankle fx s/p ORIF '08, AOID with nonhealing R foot wounds S/P Right Ax to Fem PTFE bypass 1/31 and Right chest wall hematoma evacuation, right foot wound and achilles debridement with 2nd toe partial amputation, VAC placement 2/4. Now represents from [facility] with fevers, leukocytosis, encephalopathy with concern for Right groin wound sloughing at incision site.     Plan:  OR today for right groin wound exploration, washout, possible wound VAC placement  NPO  Hold Xarelto 2.5mg BID for surgery  Continue ASA, statin  ABX per primary  BCx/UCx pending  Podiatry for Right foot wound managment             Subjective:  Disoriented this am, denies pain or fever    Vitals:  /53   Pulse 85   Temp (!) 100.6 °F (38.1 °C)   Resp 20   Ht 5' 1\" (1.549 m)   Wt 68.9 kg (151 lb 14.4 oz)   SpO2 92%   BMI 28.70 kg/m²     I/Os:  I/O last 3 completed shifts:  In: 578.8 [I.V.:378.8; IV Piggyback:200]  Out: 2050 [Urine:2050]  No intake/output data recorded.    Lab Results and Cultures:   Lab Results   Component Value Date    WBC 18.55 (H) 02/14/2024    HGB 11.0 (L) 02/14/2024    HCT 33.9 (L) 02/14/2024    MCV 97 02/14/2024     (H) 02/14/2024     Lab Results   Component Value Date    GLUCOSE 143 (H) 01/31/2024    CALCIUM 10.0 02/14/2024     05/21/2018    K 3.8 02/14/2024    CO2 31 02/14/2024    CL 99 02/14/2024    BUN 21 02/14/2024    CREATININE 0.67 02/14/2024     Lab Results   Component Value Date    INR 1.16 02/14/2024    INR 1.03 02/04/2024    INR 1.11 02/03/2024    PROTIME 14.7 (H) 02/14/2024    PROTIME 13.4 02/04/2024    PROTIME 14.2 02/03/2024        Blood Culture:   Lab " Results   Component Value Date    BLOODCX Received in Microbiology Lab. Culture in Progress. 02/13/2024    BLOODCX Received in Microbiology Lab. Culture in Progress. 02/13/2024   ,   Urinalysis:   Lab Results   Component Value Date    COLORU Yellow 11/20/2023    COLORU YELLOW 05/21/2018    CLARITYU Clear 11/20/2023    CLARITYU CLEAR 05/21/2018    SPECGRAV 1.010 11/20/2023    SPECGRAV 1.015 05/21/2018    PHUR 5.0 11/20/2023    PHUR 6.0 05/21/2018    LEUKOCYTESUR Negative 11/20/2023    LEUKOCYTESUR NEGATIVE 05/21/2018    NITRITE Negative 11/20/2023    NITRITE NEGATIVE 05/21/2018    PROTEINUA NEGATIVE 05/21/2018    GLUCOSEU Negative 11/20/2023    GLUCOSEU NEGATIVE 05/21/2018    KETONESU Negative 11/20/2023    KETONESU NEGATIVE 05/21/2018    BILIRUBINUR Negative 11/20/2023    BILIRUBINUR NEGATIVE 05/21/2018    BLOODU Negative 11/20/2023    BLOODU NEGATIVE 05/21/2018   ,   Urine Culture:   Lab Results   Component Value Date    URINECX No Growth <1000 cfu/mL 11/20/2023   ,   Wound Culure:   Lab Results   Component Value Date    WOUNDCULT 1+ Growth of 01/23/2024       Medications:  Current Facility-Administered Medications   Medication Dose Route Frequency    acetaminophen (TYLENOL) tablet 650 mg  650 mg Oral Q6H PRN    acetaminophen (TYLENOL) tablet 975 mg  975 mg Oral Q8H JILLIAN    albuterol (PROVENTIL HFA,VENTOLIN HFA) inhaler 2 puff  2 puff Inhalation Q6H PRN    aspirin (ECOTRIN LOW STRENGTH) EC tablet 81 mg  81 mg Oral Daily    atorvastatin (LIPITOR) tablet 10 mg  10 mg Oral Daily With Dinner    buPROPion (WELLBUTRIN XL) 24 hr tablet 300 mg  300 mg Oral Daily    cefepime (MAXIPIME) 1,000 mg in dextrose 5 % 50 mL IVPB  1,000 mg Intravenous Q12H    clonazePAM (KlonoPIN) tablet 1 mg  1 mg Oral QPM    docusate sodium (COLACE) capsule 100 mg  100 mg Oral BID    fish oil capsule 1,000 mg  1,000 mg Oral Daily    fluticasone-vilanterol 200-25 mcg/actuation 1 puff  1 puff Inhalation Daily    insulin lispro (HumaLOG) 100 units/mL  subcutaneous injection 1-5 Units  1-5 Units Subcutaneous Q6H JILLIAN    methocarbamol (ROBAXIN) tablet 250 mg  250 mg Oral Q8H JILLIAN    nicotine (NICODERM CQ) 14 mg/24hr TD 24 hr patch 1 patch  1 patch Transdermal Daily    ondansetron (ZOFRAN) injection 4 mg  4 mg Intravenous Q6H PRN    oxybutynin (DITROPAN-XL) 24 hr tablet 5 mg  5 mg Oral Daily    oxyCODONE (ROXICODONE) split tablet 2.5 mg  2.5 mg Oral Q4H PRN    Or    oxyCODONE (ROXICODONE) IR tablet 5 mg  5 mg Oral Q4H PRN    pantoprazole (PROTONIX) EC tablet 40 mg  40 mg Oral Early Morning    pregabalin (LYRICA) capsule 200 mg  200 mg Oral TID    senna (SENOKOT) tablet 17.2 mg  2 tablet Oral Daily PRN    sodium chloride 0.9 % infusion  75 mL/hr Intravenous Continuous    vancomycin (VANCOCIN) IVPB (premix in dextrose) 750 mg 150 mL  750 mg Intravenous Q12H         Physical Exam:    General appearance: alert and oriented, in no acute distress  Lungs: clear to auscultation bilaterally  Heart: S1, S2 normal  Abdomen: soft, non-tender; bowel sounds normal; no masses,  no organomegaly  Extremities: extremities warm and well-perfused; no cyanosis, clubbing, or edema    Wound/Incision:  Right chest wall swelling improved, well approximated, no erythema or drainage  Right counter incision clean, dry, and intact  Right groin w/ fibrinous slough at incision site, no erythema/drainage    Pulse exam:  Radial: Right: 2+  AT: Right: 2+   PT: Right: 2+       Shaneka Ortega PA-C  2/14/2024

## 2024-02-14 NOTE — PROGRESS NOTES
[facility] Podiatry - Progress Note  Patient: Lona Cedeno 78 y.o. female   MRN: [id number]  PCP: Vivek Preston DO  Unit/Bed#: [facility] 815-01 Encounter: [id number]  Date Of Visit: 24  Hospital Stay Days: 1    ASSESSMENT:    Lona Cedeno is a 78 y.o. 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 Number of sponges: 1 black  2. Pressure settin continuous  3. Size of wound: 5.8 x 4 x 0.3  4. Description of wound: granular, bleeding tendon    After  Verbal Consent was obtained and time out performed. Wound located posterior right leg was  excisionally Surgical- fat / tendon / muscle (CPT: 21359) debrided using scapel. Pre-debridement wound measures 5.8 cm x 4 cm x 0.3 cm.  Pain was controlled by Lack of sensation due to Neuropathy . Post debridement measurement 5.8 cm x 4.1 cm x 0.3 cm for a total of 23 square centimeters, with wound appearing Fresh bleeding tissue, viable, and granular. 100%  of wound debrided. Tissue debrided includes depth of Tendon with devitalized tissue being debrided including Hyperkeratosis, Slough, Fibrous, Necrotic/eschar, and Biofilm.  with a small amount of bleeding bleeding was noted during procedure. Hemostasis was achieved using pressure. Pain noted during procedure rated as 2. Pain noted after procedure rated as 0. Procedure was Well tolerated by patient.      SUBJECTIVE:     The patient was seen, evaluated, and assessed at bedside today. The patient was awake, alert, and in no acute  "distress. No acute events overnight. The patient reports feeling well but frustrated about infection. Patient denies N/V/F/chills/SOB/CP.      OBJECTIVE:     Vitals:   /52   Pulse 94   Temp (!) 102.3 °F (39.1 °C) (Oral)   Resp 18   Ht 5' 1\" (1.549 m)   Wt 68.9 kg (151 lb 14.4 oz)   SpO2 95%   BMI 28.70 kg/m²     Temp (24hrs), Av.4 °F (38.6 °C), Min:98.4 °F (36.9 °C), Max:102.8 °F (39.3 °C)      Physical Exam :     General:  Alert, cooperative, and in no distress.  Lower extremity exam:  Neurologic, Vascular, musculoskeletal status at baseline    Dermatologic: negative for surrounding erythema, heat swelling, malodor, or drainage. Toe amputation site healing well with sutures intact.     Wound #: 1  Location: right posterior leg  Length 5.8 cm: Width 4cm: Depth 0.3cm:   Deepest Tissue Noted in Base: tendon  Probe to Bone: No  Peripheral Skin Description: Attached  Granulation: 70% Fibrotic Tissue: 30% Necrotic Tissue: 0%   Drainage Amount: moderate bloody  Signs of Infection: No     Wound #: 2  Location: medial foot  Length 2.2cm: Width 2.8cm: Depth 0.2cm:   Deepest Tissue Noted in Base: subcutaneous  Probe to Bone: No  Peripheral Skin Description: Attached  Granulation: 90% Fibrotic Tissue: 10% Necrotic Tissue: 0%   Drainage Amount: moderate serosanguinous  Signs of Infection: No       Clinical Images 24:                Additional Data:     Labs:    Results from last 7 days   Lab Units 24  0446 24  0453   WBC Thousand/uL 18.55* 14.83*   HEMOGLOBIN g/dL 11.0* 10.4*   HEMATOCRIT % 33.9* 32.8*   PLATELETS Thousands/uL 391* 404*   NEUTROS PCT %  --  76*   LYMPHS PCT %  --  12*   MONOS PCT %  --  7   EOS PCT %  --  5     Results from last 7 days   Lab Units 24  0446   POTASSIUM mmol/L 3.8   CHLORIDE mmol/L 99   CO2 mmol/L 31   BUN mg/dL 21   CREATININE mg/dL 0.67   CALCIUM mg/dL 10.0     Results from last 7 days   Lab Units 24  0446   INR  1.16       * I Have Reviewed All " Lab Data Listed Above.    Recent Cultures (last 7 days):     Results from last 7 days   Lab Units 02/13/24  1750   BLOOD CULTURE  Received in Microbiology Lab. Culture in Progress.  Received in Microbiology Lab. Culture in Progress.           Imaging: I have personally reviewed pertinent films in PACS  No results found.  EKG, Pathology, and Other Studies: I have personally reviewed pertinent reports.    Active medications:  Current Facility-Administered Medications   Medication Dose Route Frequency    acetaminophen (TYLENOL) tablet 650 mg  650 mg Oral Q6H PRN    acetaminophen (TYLENOL) tablet 975 mg  975 mg Oral Q8H JILLIAN    albuterol (PROVENTIL HFA,VENTOLIN HFA) inhaler 2 puff  2 puff Inhalation Q6H PRN    aspirin (ECOTRIN LOW STRENGTH) EC tablet 81 mg  81 mg Oral Daily    atorvastatin (LIPITOR) tablet 10 mg  10 mg Oral Daily With Dinner    buPROPion (WELLBUTRIN XL) 24 hr tablet 300 mg  300 mg Oral Daily    cefepime (MAXIPIME) 1,000 mg in dextrose 5 % 50 mL IVPB  1,000 mg Intravenous Q12H    clonazePAM (KlonoPIN) tablet 1 mg  1 mg Oral QPM    docusate sodium (COLACE) capsule 100 mg  100 mg Oral BID    fish oil capsule 1,000 mg  1,000 mg Oral Daily    fluticasone-vilanterol 200-25 mcg/actuation 1 puff  1 puff Inhalation Daily    insulin lispro (HumaLOG) 100 units/mL subcutaneous injection 1-5 Units  1-5 Units Subcutaneous Q6H JILLIAN    methocarbamol (ROBAXIN) tablet 250 mg  250 mg Oral Q8H JILLIAN    nicotine (NICODERM CQ) 14 mg/24hr TD 24 hr patch 1 patch  1 patch Transdermal Daily    ondansetron (ZOFRAN) injection 4 mg  4 mg Intravenous Q6H PRN    oxybutynin (DITROPAN-XL) 24 hr tablet 5 mg  5 mg Oral Daily    oxyCODONE (ROXICODONE) split tablet 2.5 mg  2.5 mg Oral Q4H PRN    Or    oxyCODONE (ROXICODONE) IR tablet 5 mg  5 mg Oral Q4H PRN    pantoprazole (PROTONIX) EC tablet 40 mg  40 mg Oral Early Morning    pregabalin (LYRICA) capsule 200 mg  200 mg Oral TID    senna (SENOKOT) tablet 17.2 mg  2 tablet Oral Daily PRN     "sodium chloride 0.9 % infusion  75 mL/hr Intravenous Continuous    vancomycin (VANCOCIN) IVPB (premix in dextrose) 750 mg 150 mL  750 mg Intravenous Q12H         ** Please Note: Portions of the record may have been created with voice recognition software. Occasional wrong word or \"sound a like\" substitutions may have occurred due to the inherent limitations of voice recognition software. Read the chart carefully and recognize, using context, where substitutions have occurred. **      "

## 2024-02-14 NOTE — PROGRESS NOTES
Patient:  BONIFACIO LEMUS    MRN:  [id number]    [facility] Request ID:  [id number]    Level of care reserved:  Inpatient Rehab Facility    Partner Reserved:  [facility] ([place]), [place] [id number] [phone]    Clinical needs requested:    Geography searched:  [place] miles around [phone]    Start of Service:    Request sent:  12:12pm EST on 2/14/2024 by Felipa Durham    Partner reserved:  3:30pm EST on 2/14/2024 by Felipa Durham    Choice list shared:  3:30pm EST on 2/14/2024 by Felipa Durham

## 2024-02-14 NOTE — PLAN OF CARE
Problem: Prexisting or High Potential for Compromised Skin Integrity  Goal: Skin integrity is maintained or improved  Description: INTERVENTIONS:  - Identify patients at risk for skin breakdown  - Assess and monitor skin integrity  - Assess and monitor nutrition and hydration status  - Monitor labs   - Assess for incontinence   - Turn and reposition patient  - Assist with mobility/ambulation  - Relieve pressure over bony prominences  - Avoid friction and shearing  - Provide appropriate hygiene as needed including keeping skin clean and dry  - Evaluate need for skin moisturizer/barrier cream  - Collaborate with interdisciplinary team   - Patient/family teaching  - Consider wound care consult   Outcome: Progressing     Problem: PAIN - ADULT  Goal: Verbalizes/displays adequate comfort level or baseline comfort level  Description: Interventions:  - Encourage patient to monitor pain and request assistance  - Assess pain using appropriate pain scale  - Administer analgesics based on type and severity of pain and evaluate response  - Implement non-pharmacological measures as appropriate and evaluate response  - Consider cultural and social influences on pain and pain management  - Notify physician/advanced practitioner if interventions unsuccessful or patient reports new pain  Outcome: Progressing     Problem: INFECTION - ADULT  Goal: Absence or prevention of progression during hospitalization  Description: INTERVENTIONS:  - Assess and monitor for signs and symptoms of infection  - Monitor lab/diagnostic results  - Monitor all insertion sites, i.e. indwelling lines, tubes, and drains  - Monitor endotracheal if appropriate and nasal secretions for changes in amount and color  - [place] appropriate cooling/warming therapies per order  - Administer medications as ordered  - Instruct and encourage patient and family to use good hand hygiene technique  - Identify and instruct in appropriate isolation precautions for  identified infection/condition  Outcome: Progressing     Problem: SAFETY ADULT  Goal: Patient will remain free of falls  Description: INTERVENTIONS:  - Educate patient/family on patient safety including physical limitations  - Instruct patient to call for assistance with activity   - Consult OT/PT to assist with strengthening/mobility   - Keep Call bell within reach  - Keep bed low and locked with side rails adjusted as appropriate  - Keep care items and personal belongings within reach  - Initiate and maintain comfort rounds  - Make Fall Risk Sign visible to staff  - Offer Toileting every 2 Hours, in advance of need  - Initiate/Maintain bed/chair alarm  - Obtain necessary fall risk management equipment: alarms  - Apply yellow socks and bracelet for high fall risk patients  - Consider moving patient to room near nurses station  Outcome: Progressing  Goal: Maintain or return to baseline ADL function  Description: INTERVENTIONS:  -  Assess patient's ability to carry out ADLs; assess patient's baseline for ADL function and identify physical deficits which impact ability to perform ADLs (bathing, care of mouth/teeth, toileting, grooming, dressing, etc.)  - Assess/evaluate cause of self-care deficits   - Assess range of motion  - Assess patient's mobility; develop plan if impaired  - Assess patient's need for assistive devices and provide as appropriate  - Encourage maximum independence but intervene and supervise when necessary  - Involve family in performance of ADLs  - Assess for home care needs following discharge   - Consider OT consult to assist with ADL evaluation and planning for discharge  - Provide patient education as appropriate  Outcome: Progressing  Goal: Maintains/Returns to pre admission functional level  Description: INTERVENTIONS:  - Perform AM-PAC 6 Click Basic Mobility/ Daily Activity assessment daily.  - Set and communicate daily mobility goal to care team and patient/family/caregiver.   -  Collaborate with rehabilitation services on mobility goals if consulted  - Perform Range of Motion 3 times a day.  - Reposition patient every 2 hours.  - Dangle patient 3 times a day  - Stand patient 3 times a day  - Ambulate patient 3 times a day  - Out of bed to chair 3 times a day   - Out of bed for meals 3 times a day  - Out of bed for toileting  - Record patient progress and toleration of activity level   Outcome: Progressing     Problem: DISCHARGE PLANNING  Goal: Discharge to home or other facility with appropriate resources  Description: INTERVENTIONS:  - Identify barriers to discharge w/patient and caregiver  - Arrange for needed discharge resources and transportation as appropriate  - Identify discharge learning needs (meds, wound care, etc.)  - Arrange for interpretive services to assist at discharge as needed  - Refer to Case Management Department for coordinating discharge planning if the patient needs post-hospital services based on physician/advanced practitioner order or complex needs related to functional status, cognitive ability, or social support system  Outcome: Progressing

## 2024-02-14 NOTE — ASSESSMENT & PLAN NOTE
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 Now represents from [facility] with fevers, leukocytosis, encephalopathy with concern for Right groin wound sloughing at incision site.     Plan:  OR today for right groin wound exploration, washout, possible wound VAC placement  NPO  Hold Xarelto 2.5mg BID for surgery  Continue ASA, statin  ABX per primary  BCx/UCx pending  Podiatry for Right foot wound managment

## 2024-02-14 NOTE — [facility] ADMISSION
ARC  contacted the patient and or family: introduced self, explained role, reviewed [facility] program, services offered, acute rehab criteria, approval process, insurance authorization process, [facility] locations and preferences. Patient / family preferred [facility] location is [place] and second option is [place]. Patient / family made aware ARC Reviewer will keep their Care Manager updated regarding referral status.      Thank you,   Quiana Nassar  [facility] Admissions

## 2024-02-14 NOTE — ASSESSMENT & PLAN NOTE
Lab Results   Component Value Date    HGBA1C 6.5 (H) 11/07/2023       Recent Labs     02/13/24  0617 02/13/24  1032 02/13/24  1608 02/13/24 2036   POCGLU 144* 118 122 106       Blood Sugar Average: Last 72 hrs:  (P) 144.125  Hold metformin, will convert to an inpatient regimen when she is moved to inpatient B

## 2024-02-14 NOTE — QUICK NOTE
SLIM on call paged due to a critical lab result of lactate 2.3. PT seen and evaluated. Reports she is well and has no compliants.    Doubt sepsis at this time, elevated lactate is likely due to metformin use.     Flu/COVID/RSV is negative   Pt has multiple potential sources of infection  Will chest CT of the chest to evaluate for abscess at her chest wall  Await UA results  Podiatry evaluation for foot wounds.    Cefepime ordered by the day team, will continue.  Temps improved and BP is stable.

## 2024-02-14 NOTE — PROGRESS NOTES
02/14/24 0914   Hello, [Guardian’s Name / Patient’s Name], this is [Caller Name] from [facility], and our clinical care team wanted to check on you / your child after your recent visit to the hospital. It will only take 3-5 minutes. Is this a good time?   Discharge Call Type/ Specific Diagnosis: General Call   Call Complete   Attempted Number of Calls 1   Discharge phone call complete? Does not meet criteria  (D/C to acute hospital)

## 2024-02-14 NOTE — CASE MANAGEMENT
Case Management Assessment & Discharge Planning Note    Patient name Lona Cedeno  Location [facility]-01 MRN [id number]  : 1945 Date 2024       Current Admission Date: 2024  Current Admission Diagnosis:Surgical site infection   Patient Active Problem List    Diagnosis Date Noted    Leukocytosis 2024    Surgical site infection 2024    Sepsis without acute organ dysfunction (HCC) 2024    At risk for venous thromboembolism (VTE) 2024    At high risk for skin breakdown 2024    Wound of skin 2024    Impaired mobility and activities of daily living 2024    Acute pain due to injury 2024    Hematoma 2024    Constipation 2024    Preoperative cardiovascular examination 2024    Diabetic foot ulcer with osteomyelitis ([facility]) 2024    Ankle ulcer, right, with fat layer exposed ([facility]) 2023    Age-related osteoporosis without current pathological fracture 2023    Abnormal CT of the chest 10/17/2023    COPD, severity to be determined ([facility]) 10/17/2023    Tobacco use disorder 10/17/2023    PAD (peripheral artery disease) ([facility]) 10/10/2023    Bladder neoplasm 2023    Left renal mass 2023    Lactic acidosis 2023    Hypomagnesemia 2023    Degenerative lumbar disc 2023    Urinary incontinence 2023    GERD without esophagitis 10/16/2019    Medicare annual wellness visit, subsequent 2019    Essential hypertension 02/15/2019    Anxiety and depression 02/15/2019    Type 2 diabetes mellitus with diabetic polyneuropathy (HCC) 2019      LOS (days): 1  Geometric Mean LOS (GMLOS) (days): 5.1  Days to GMLOS:4.6     OBJECTIVE:    Risk of Unplanned Readmission Score: 18.19         Current admission status: Inpatient       Preferred Pharmacy:   [id number] - [place] - [address] [place] [id number]  Phone: [phone] Fax:  [phone]    [facility] [place], [place] - [address]  [address] [place] [address]  Phone: [phone] Fax: [phone]    Primary Care Provider: Vivek Preston DO    Primary Insurance: "[employer]  Secondary Insurance:     ASSESSMENT:  Active Health Care Proxies       [facility] Representative - Spouse   Primary Phone: [phone] (Home)                           Readmission Root Cause  30 Day Readmission: Yes  Who directed you to return to the hospital?: Specialist  Did you understand whom to contact if you had questions or problems?: Yes  Did you get your prescriptions before you left the hospital?: Yes  Were you able to get your prescriptions filled when you left the hospital?: Yes  Did you take your medications as prescribed?: Yes  Were you able to get to your follow-up appointments?: Yes  During previous admission, was a post-acute recommendation made?: Yes  What post-acute resources were offered?: STR  Patient was readmitted due to: Sepsis without acute organ dysfunction  Action Plan: admit    Patient Information  Admitted from:: Facility (at [facility])  Mental Status: Alert                                   DISCHARGE DETAILS:    Discharge planning discussed with:: patient at bedside     Comments - Freedom of Choice: would like to return to [facility] to continue rehab  CM contacted family/caregiver?: No- see comments (patient alert & oriented)  Were Treatment Team discharge recommendations reviewed with patient/caregiver?: Yes  Did patient/caregiver verbalize understanding of patient care needs?: Yes  Were patient/caregiver advised of the risks associated with not following Treatment Team discharge recommendations?: Yes    Contacts  Patient Contacts: Ariela Feng, daughter  Relationship to Patient:: Family  Contact Method: Phone  Phone Number: [phone]  Reason/Outcome: Continuity of Care, Emergency Contact, Discharge Planning                        Treatment  Team Recommendation: Acute Rehab  Discharge Destination Plan:: Acute Rehab  Transport at Discharge : Other (Comment) (internal transfer)         Additional Comments: Patient is a readmission, was discharged to [facility] on 2/9.  Patient would like to return to [facility] to complete rehab when cleared for discharge.

## 2024-02-14 NOTE — DISCHARGE SUMMARY
[facility]  Discharge- Lona Cedeno 1945, 78 y.o. female MRN: [id number]  Unit/Bed#: -01 Encounter: [id number]  Primary Care Provider: Vivek Preston DO   Date and time admitted to hospital: 2/9/2024  2:17 PM    * Diabetic foot ulcer with osteomyelitis (HCC)  Assessment & Plan  Contacted podiatry service, she was seen yesterday with photos in the chart, no concerns for infection at that time.  Podiatry to evaluate again tomorrow for wound vac change and will investigate further for infection      Leukocytosis  Assessment & Plan  Pt with leukocytosis today and fever, cultures drawn earlier today and started on cefepime  D/w podiatry and vascular surgery as above  Currently there is concern for a surgical site infection in the groin which will need a washout  Will transfer to inpatient hospital at [facility] and consult with vascular surgery for a washout  Will add vancomycin    Essential hypertension  Assessment & Plan  BP is acceptable, continue current regimen    Type 2 diabetes mellitus with diabetic polyneuropathy (HCC)  Assessment & Plan  Lab Results   Component Value Date    HGBA1C 6.5 (H) 11/07/2023       Recent Labs     02/13/24  0617 02/13/24  1032 02/13/24  1608 02/13/24 2036   POCGLU 144* 118 122 106       Blood Sugar Average: Last 72 hrs:  (P) 144.125  Hold metformin, will convert to an inpatient regimen when she is moved to inpatient [facility]        Medical Problems       Resolved Problems  Date Reviewed: 2/10/2024   None       Discharging Physician / Practitioner: Kurt Meier MD  PCP: Vivek Preston DO  Admission Date:   Admission Orders (From admission, onward)       Ordered        02/09/24 1437  Admit Patient to  Once                          Discharge Date: 02/13/24    Consultations During Hospital Stay:  PMR  podiatry    Procedures Performed:   None    Significant Findings / Test Results:   Elevated WBCs  fever    Incidental Findings:   None  "      Test Results Pending at Discharge (will require follow up):   Blood culture      Reason for Admission: physical therapy    Hospital Course:   Lona Cedeno is a 78 y.o. female patient who originally presented to [facility] on [facility]/2024 due to rehabilitation. She was admitted and started her therapy. During her hospital course she developed a fever and leukocytosis. She was evaluated and found to have a suspected infection at the surgical site in her groin. She was discharged from rehabilitation and admitted back to [facility]    Please see above list of diagnoses and related plan for additional information.     Condition at Discharge: stable    Discharge Day Visit / Exam:   Subjective:  denies any complaints.  Vitals: Blood Pressure: 127/58 (02/13/24 2028)  Pulse: 95 (02/13/24 2028)  Temperature: (!) 102.8 °F (39.3 °C) (RN Aware) (02/13/24 2028)  Temp Source: Oral (02/13/24 2028)  Respirations: 18 (02/13/24 2028)  Height: 5' 1\" (154.9 cm) (02/09/24 1430)  Weight - Scale: 69.5 kg (153 lb 3.5 oz) (02/09/24 1430)  SpO2: 96 % (02/13/24 2028)  Exam:   Physical Exam  Constitutional:       Appearance: Normal appearance.   HENT:      Head: Normocephalic and atraumatic.      Nose: Nose normal.   Eyes:      Extraocular Movements: Extraocular movements intact.   Cardiovascular:      Rate and Rhythm: Normal rate and regular rhythm.   Pulmonary:      Effort: Pulmonary effort is normal.      Breath sounds: No wheezing or rales.   Musculoskeletal:      Comments: Clean dressing on right upper chest  Wound vac on leg  Prevalon boot in place  Dressings are clean and dry   Skin:     General: Skin is warm and dry.   Neurological:      Mental Status: She is alert. She is disoriented.   Psychiatric:         Mood and Affect: Mood normal.         Behavior: Behavior normal.          Discussion with Family: call both her  and daughter, no answer    Discharge instructions/Information to patient and family:   See after visit summary " for information provided to patient and family.      Provisions for Follow-Up Care:  See after visit summary for information related to follow-up care and any pertinent home health orders.      Mobility at time of Discharge:      HLM Goal NOT achieved. Continue to encourage mobility in post discharge setting.     Disposition:   Acute Care Hospital Transfer to [facility]    Planned Readmission: No     Discharge Statement:  I spent 40 minutes discharging the patient. This time was spent on the day of discharge. I had direct contact with the patient on the day of discharge. Greater than 50% of the total time was spent examining patient, answering all patient questions, arranging and discussing plan of care with patient as well as directly providing post-discharge instructions.  Additional time then spent on discharge activities.    Discharge Medications:  See after visit summary for reconciled discharge medications provided to patient and/or family.      **Please Note: This note may have been constructed using a voice recognition system**

## 2024-02-14 NOTE — OP NOTE
OPERATIVE REPORT  PATIENT NAME: Lona Cedeno    :  1945  MRN: [id number]  Pt Location: BE OR ROOM 06    SURGERY DATE: 2024    Surgeons and Role:     * Suly Muro DO - Primary     * Shaq Mendosa DO - Assisting     * Himanshu Overton MD    Preop Diagnosis:  Surgical site infection [T81.49XA]    Post-Op Diagnosis Codes:     * Surgical site infection [T81.49XA]    Procedure(s):  Right - DEBRIDEMENT RIGHT GROIN  WOUND AND WASHOUT. APPLICATION OF WOUND VAC    Specimen(s):  ID Type Source Tests Collected by Time Destination   A : tissue culture right groin Tissue Groin ANAEROBIC CULTURE AND GRAM STAIN, CULTURE, TISSUE AND GRAM STAIN Suly Muro, DO 2024 1534    B : RIGHT GROIN C&S WOUND Wound Wound ANAEROBIC CULTURE AND GRAM STAIN, WOUND CULTURE Suly Muro, DO 2024 1539        Estimated Blood Loss:   Minimal    Drains:  Ureteral Internal Stent Left ureter 6 Fr. (Active)   Number of days: 86       [REMOVED] Urethral Catheter Latex (Removed)   Reasons to continue Urinary Catheter  Post-operative urological requirements 24   Goal for Removal Remove POD#1 24   Site Assessment Clean;Skin intact 24   [facility] Care Done 24 2100   Collection Container Standard drainage bag 24   Securement Method Securing device (Describe) 24   Output (mL) 175 mL 24 0509   Number of days: 1       Anesthesia Type:   General    Operative Indications:  Surgical site infection [T81.49XA]    Operative Findings:  -Superficial tissue debrided  -Deep tissue and fascia intact without signs of breakdown or infection, no purulence  -The PTFE bypass was not visualized, appeared well incorporated   -Final wound dimensions 6x4x2 cm    -Wound vac set to 125mmHg    Complications:   None    Procedure and Technique:  Patient seen and examined in the preoperative holding area. The patient was identified both verbally and by wrist band. The patient was  marked and transported to the OR. In the OR, the patient was again identified verbally and by wrist band. She was transferred to the OR table and general anesthesia was induced. The patient was positioned supine and prepped with chlorhexidine. The prep was given 3 minutes to dry. The patient was draped in a sterile fashion. The right Groin wound was opened and explored. No purulence was observed. Subcutaneous tissue was debrided down to viable bleeding tissue. No muscle tendon or bone was visualized. The PTFE bypass graft was not visualized and appeared to be well incorporated. A pulse lavage was used to debride the wound. The wound appeared hemostatic at the end of the case. The final wound dimensions measured 6x4x2 cm. A wound vac with 2 black sponges set to 125mmHg  was placed at the end of the case.   Dr. Muro was present for the entire procedure.     Patient Disposition:  PACU         SIGNATURE: Himanshu Overton MD  DATE: February 14, 2024  TIME: 4:13 PM

## 2024-02-14 NOTE — PROGRESS NOTES
Lona Cedeno is a 78 y.o. female who is currently ordered Vancomycin IV with management by the Pharmacy Consult service.  Relevant clinical data and objective / subjective history reviewed.  Vancomycin Assessment:  Indication and Goal AUC/Trough: Soft tissue (goal -600, trough >10)  Clinical Status: stable  Micro: cultures in process  Renal Function:  SCr: 0.67 mg/dL  CrCl: 61.9 mL/min  Renal replacement: Not on dialysis  Days of Therapy: 2  Current Dose: 750mg IV q12h  Vancomycin Plan:  New Dosing: continue current dosing  Estimated AUC: 476 mcg*hr/mL  Estimated Trough: 15.4 mcg/mL  Next Level: Random 2/15 at 0600  Renal Function Monitoring: Daily BMP and UOP  Pharmacy will continue to follow closely for s/sx of nephrotoxicity, infusion reactions and appropriateness of therapy.  BMP and CBC will be ordered per protocol. We will continue to follow the patient’s culture results and clinical progress daily.    Marina Burrell, Pharmacist

## 2024-02-14 NOTE — ASSESSMENT & PLAN NOTE
Lab Results   Component Value Date    HGBA1C 6.5 (H) 11/07/2023       Recent Labs     02/13/24  0617 02/13/24  1032 02/13/24  1608 02/13/24 2036   POCGLU 144* 118 122 106       Blood Sugar Average: Last 72 hrs:  hold metformin while inpatient  Diabetic diet  Fingersticks QID with sliding scale coverage

## 2024-02-14 NOTE — ASSESSMENT & PLAN NOTE
Lab Results   Component Value Date    HGBA1C 6.5 (H) 11/07/2023       Recent Labs     02/13/24  1608 02/13/24 2036 02/14/24  0015 02/14/24  0532   POCGLU 122 106 179* 100         Blood Sugar Average: Last 72 hrs:  (P) 139.5  hold metformin while inpatient  Diabetic diet  Fingersticks QID with sliding scale coverage

## 2024-02-14 NOTE — ASSESSMENT & PLAN NOTE
Pt noted to have fever and leukocytosis today, evaluated by vascular surgery with suspicion for a surgical site infection of her groin  Continue cefepime and will add vancomycin  Check MRSA screen   Vascular input appreciated, for washout this afternoon 2/14/24  Await cultures drawn at [facility]

## 2024-02-14 NOTE — ANESTHESIA PREPROCEDURE EVALUATION
Procedure:  DEBRIDEMENT WOUND AND DRESSING CHANGE (WASH OUT) (Right: Groin)    Relevant Problems   CARDIO   (+) Essential hypertension   (+) PAD (peripheral artery disease) (HCC)      ENDO   (+) Type 2 diabetes mellitus with diabetic polyneuropathy (HCC)      GI/HEPATIC   (+) GERD without esophagitis      MUSCULOSKELETAL   (+) Degenerative lumbar disc      NEURO/PSYCH   (+) Anxiety and depression      PULMONARY   (+) COPD, severity to be determined (HCC)      Endocrine   (+) Diabetic foot ulcer with osteomyelitis (HCC)      Musculoskeletal and Integument   (+) Age-related osteoporosis without current pathological fracture   (+) Ankle ulcer, right, with fat layer exposed (HCC)   (+) Wound of skin      Genitourinary   (+) Bladder neoplasm      Other   (+) Acute pain due to injury   (+) At high risk for skin breakdown   (+) At risk for venous thromboembolism (VTE)   (+) Hematoma   (+) Hypomagnesemia   (+) Impaired mobility and activities of daily living   (+) Lactic acidosis   (+) Left renal mass   (+) Leukocytosis   (+) Sepsis without acute organ dysfunction (HCC)   (+) Surgical site infection   (+) Tobacco use disorder   (+) Urinary incontinence      Lab Results   Component Value Date     05/21/2018    SODIUM 138 02/14/2024    K 3.8 02/14/2024    CL 99 02/14/2024    CO2 31 02/14/2024    ANIONGAP 14.0 05/21/2018    AGAP 8 02/14/2024    BUN 21 02/14/2024    CREATININE 0.67 02/14/2024    GLUC 104 02/14/2024    GLUF 134 (H) 11/07/2023    CALCIUM 10.0 02/14/2024    AST 11 (L) 02/05/2024    ALT 3 (L) 02/05/2024    ALKPHOS 46 02/05/2024    PROT 6.4 05/21/2018    TP 5.6 (L) 02/05/2024    BILITOT 0.4 05/21/2018    TBILI 0.51 02/05/2024    EGFR 85 02/14/2024     Lab Results   Component Value Date    WBC 18.55 (H) 02/14/2024    HGB 11.0 (L) 02/14/2024    HCT 33.9 (L) 02/14/2024    MCV 97 02/14/2024     (H) 02/14/2024       Invasive Devices       Peripheral Intravenous Line  Duration             Peripheral IV  02/13/24 Left;Ventral (anterior) Forearm <1 day              Drain  Duration             Ureteral Internal Stent Left ureter 6 Fr. 86 days                  sodium chloride, 75 mL/hr, Last Rate: 75 mL/hr (02/14/24 0650)          Physical Exam    Airway    Mallampati score: II  TM Distance: >3 FB  Neck ROM: full     Dental        Cardiovascular      Pulmonary      Other Findings  post-pubertal.      Anesthesia Plan  ASA Score- 3     Anesthesia Type- general with ASA Monitors.         Additional Monitors:     Airway Plan: LMA.           Plan Factors-Exercise tolerance (METS): <4 METS.    Chart reviewed. EKG reviewed. Imaging results reviewed. Existing labs reviewed. Patient summary reviewed.                  Induction- intravenous.    Postoperative Plan-     Informed Consent- Anesthetic plan and risks discussed with patient.  I personally reviewed this patient with the CRNA. Discussed and agreed on the Anesthesia Plan with the CRNA..

## 2024-02-14 NOTE — WOUND OSTOMY CARE
Consult Note - Wound   Lona Cedeno 78 y.o. female MRN: [id number]  Unit/Bed#: [facility] 815-01 Encounter: [id number]        History and Present Illness:  Patient is a 79 yo male that was admitted to [facility] from [facility] for treatment of surgical site infection. Patient has a PMH of PAD, DM foot ulceration. Patient is a min/mod assist for turning and repositioning. Patient is continent of bowel and bladder. On assessment, patient is seen lying on regular mattress. Patient is currently NPO due to OR procedure today.     Wound Care was consulted for wound sacrum     Assessment Findings:   Right foot managed per podiatry recommendations. Right groin wound managed per vascular recommendations- patient to go to OR today for debridement of area.     B/L sacro-buttocks is dry, intact, pink in color, hyperpigmented and blanches. Fragile dry, peeled skin noted. No skin loss or wounds present. Recommend preventative silicone bordered foam dressing to area.   Left heel is pink in color and blanches. Continues with preventative silicone bordered foam dressing. .       No induration, fluctuance, odor, warmth/temperature differences, redness, or purulence noted to the above noted wounds and skin areas assessed. New dressings applied per orders listed below. Patient tolerated well- no s/s of non-verbal pain or discomfort observed during the encounter. Bedside nurse aware of plan of care. See flow sheets for more detailed assessment findings.      Orders listed below and wound care will continue to follow, call or tiger text with questions.     Skin Care Plan:  1-Right Groin Wound: per vascular recommendations    2-Turn/reposition q2h or when medically stable for pressure re-distribution on skin .  3-Elevate heels to offload pressure.  4-Moisturize skin daily with skin nourishing cream  5-Ehob cushion in chair when out of bed.  6-RLE and Right Foot: per podiatry recommendations.   7-Cleanse Sacro-Buttocks and Left Heel with soap  and water. Pat dry. Apply Silicone Border Foam (Mepilex) to areas. Gibson with P for Prevention and change every 3 days or PRN soilage/displacement. Peel back and inspect Q-shift.      Wounds:  Wound 02/13/24 Pressure Injury Sacrum (Active)   Wound Image   02/14/24 1044   Wound Description Intact 02/14/24 1044   Pressure Injury Stage O 02/13/24 2345   Ani-wound Assessment Hyperpigmented 02/14/24 1044   Wound Length (cm) 0 cm 02/14/24 1044   Wound Width (cm) 0 cm 02/14/24 1044   Wound Depth (cm) 0 cm 02/14/24 1044   Wound Surface Area (cm^2) 0 cm^2 02/14/24 1044   Wound Volume (cm^3) 0 cm^3 02/14/24 1044   Calculated Wound Volume (cm^3) 0 cm^3 02/14/24 1044   Dressing Foam, Silicon (eg. Allevyn, etc) 02/14/24 1044   Dressing Status Intact 02/14/24 1044               Sandra Vo RN, BSN, CWOCN

## 2024-02-14 NOTE — UTILIZATION REVIEW
Initial Clinical Review    The patient was transferred to [facility] on 2/13/24 from [facility], where care began on 02/09/24.  5 midnights have already been surpassed with active ongoing care.     Admission: Date/Time/Statement:   Admission Orders (From admission, onward)       Ordered        02/13/24 2334  Inpatient Admission  Once                          Orders Placed This Encounter   Procedures    Inpatient Admission     Standing Status:   Standing     Number of Occurrences:   1     Order Specific Question:   Level of Care     Answer:   Med Surg [16]     Order Specific Question:   Estimated length of stay     Answer:   More than 2 Midnights     Order Specific Question:   Certification     Answer:   I certify that inpatient services are medically necessary for this patient for a duration of greater than two midnights. See H&P and MD Progress Notes for additional information about the patient's course of treatment.     Initial Presentation: 78 y.o. female with PMHx:  PAD, DM foot ulceration who presents with fever. She was in [facility] for rehabiltiation after her BYPASS AXILLO-FEMORAL, RIGHT WITH 8MM RINGED PTFE GRAFT. She was noted to have leukocytosis and fever today. Cultures were sent and cefepime was started. SLIM was notified due to elevated serum lactate. Vascular surgery was contacted for an incision check and there was concern for infection and she was recommended to readmit to [facility] for management. Plan:  Admit Inpatient status dt surgical site infection: med surg, keep NPO, continue IV cefepime and add IV Vanco, fu on cxs, trend procal, wbc and temp. Order Prevalon boot, wound vac change on 2/14. Monitor BP and start accuchecks w/ SSI.    2/14 Per Vascular Sx:  OR today for right groin wound exploration, washout, possible wound VAC placement  NPO  Hold Xarelto 2.5mg BID for surgery  Continue ASA, statin  ABX per primary  BCx/UCx pending  Podiatry for Right foot  wound managment    2/14 Per Podiatry:   Right posterior ankle ulcer with exposed achilles tendon  - Right wound debridement (DOS: 2/4/24)  Right medial ankle ulcer, limited to breakdown of skin  Osteomyelitis of right second toe  - Right 2nd toe amputation (DOS: 2/4/24)  VAC change today with wound debridment  Continue VAC MWF  Granulation showing mild improvement, eventual graft  Continue Prevalon boot    Triage Vitals   Temperature Pulse Respirations Blood Pressure SpO2   02/13/24 2349 02/13/24 2349 02/13/24 2349 02/13/24 2349 02/13/24 2349   (!) 100.6 °F (38.1 °C) 85 20 102/53 92 %      Temp Source Heart Rate Source Patient Position - Orthostatic VS BP Location FiO2 (%)   02/14/24 0809 -- -- -- --   Axillary          Pain Score       02/13/24 2359       No Pain          Wt Readings from Last 1 Encounters:   02/13/24 68.9 kg (151 lb 14.4 oz)     Additional Vital Signs:   Date/Time Temp Pulse Resp BP MAP (mmHg) SpO2 O2 Device   02/14/24 08:10:40 102.3 °F (39.1 °C) Abnormal  94 18 101/52 68 95 % --   02/14/24 08:09:53 101 °F (38.3 °C) Abnormal  95 -- -- -- 95 % --   02/14/24 0700 -- -- -- -- -- -- None (Room air)   02/13/24 23:49:36 100.6 °F (38.1 °C) Abnormal  85 20 102/53 69 92 % --     Pertinent Labs/Diagnostic Test Results:   XR chest portable   Final Result by Sandra Ceja MD (02/14 1003)      Mild pulmonary vascular congestion. Mild bibasilar atelectasis.            Workstation performed: [id number]           Results from last 7 days   Lab Units 02/13/24  1455   SARS-COV-2  Negative     Results from last 7 days   Lab Units 02/14/24  0446 02/13/24  0453 02/09/24  0540   WBC Thousand/uL 18.55* 14.83* 9.90   HEMOGLOBIN g/dL 11.0* 10.4* 10.3*   HEMATOCRIT % 33.9* 32.8* 32.3*   PLATELETS Thousands/uL 391* 404* 367   NEUTROS ABS Thousands/µL  --  11.32* 6.24         Results from last 7 days   Lab Units 02/14/24  0446 02/13/24  0453 02/09/24  0540   SODIUM mmol/L 138 137 138   POTASSIUM mmol/L 3.8 4.4 4.4  "  CHLORIDE mmol/L 99 101 100   CO2 mmol/L 31 30 30   ANION GAP mmol/L 8 6 8   BUN mg/dL 21 25 14   CREATININE mg/dL 0.67 0.59* 0.59*   EGFR ml/min/1.73sq m 85 88 88   CALCIUM mg/dL 10.0 9.5 10.0         Results from last 7 days   Lab Units 02/14/24  0532 02/14/24  0015 02/13/24  2036 02/13/24  1608 02/13/24  1032 02/13/24  0617 02/12/24  2127 02/12/24  1724 02/12/24  1047 02/12/24  0611 02/11/24  2041 02/11/24  1529   POC GLUCOSE mg/dl 100 179* 106 122 118 144* 135 139 125 153* 127 184*     Results from last 7 days   Lab Units 02/14/24  0446 02/13/24  0453 02/09/24  0540   GLUCOSE RANDOM mg/dL 104 157* 143*             No results found for: \"BETA-HYDROXYBUTYRATE\"                           Results from last 7 days   Lab Units 02/14/24  0446   PROTIME seconds 14.7*   INR  1.16   PTT seconds 24         Results from last 7 days   Lab Units 02/14/24  0446 02/13/24  1427   PROCALCITONIN ng/ml 0.52* 0.06     Results from last 7 days   Lab Units 02/13/24 2129 02/13/24  1750   LACTIC ACID mmol/L 1.7 2.3*                                                     Results from last 7 days   Lab Units 02/13/24  1455   INFLUENZA A PCR  Negative   INFLUENZA B PCR  Negative   RSV PCR  Negative                             Results from last 7 days   Lab Units 02/13/24  1750   BLOOD CULTURE  Received in Microbiology Lab. Culture in Progress.  Received in Microbiology Lab. Culture in Progress.                   ED Treatment:   Medication Administration - No Administrations Displayed (No Start Event Found)       None          Past Medical History:   Diagnosis Date    Anxiety     Closed fracture of coccyx ([facility]) 05/24/2023    Diabetes mellitus ([facility])     GERD (gastroesophageal reflux disease)     Hyperlipidemia     Hypertension     IBS (irritable bowel syndrome)     Shoulder fracture      Present on Admission:   Surgical site infection   Diabetic foot ulcer with osteomyelitis ([facility])   COPD, severity to be determined ([facility])   Essential " hypertension   PAD (peripheral artery disease) (HCC)   Type 2 diabetes mellitus with diabetic polyneuropathy (HCC)   Sepsis without acute organ dysfunction (HCC)      Admitting Diagnosis: Diabetic foot ulcer with osteomyelitis (HCC) [E11.621, E11.69, L97.509, M86.9]  Age/Sex: 78 y.o. female  Admission Orders:  Scheduled Medications:  acetaminophen, 975 mg, Oral, Q8H JILLIAN  aspirin, 81 mg, Oral, Daily  atorvastatin, 10 mg, Oral, Daily With Dinner  buPROPion, 300 mg, Oral, Daily  cefepime, 1,000 mg, Intravenous, Q12H  clonazePAM, 1 mg, Oral, QPM  docusate sodium, 100 mg, Oral, BID  fish oil, 1,000 mg, Oral, Daily  fluticasone-vilanterol, 1 puff, Inhalation, Daily  insulin lispro, 1-5 Units, Subcutaneous, Q6H JILLIAN  methocarbamol, 250 mg, Oral, Q8H JILLIAN  nicotine, 1 patch, Transdermal, Daily  oxybutynin, 5 mg, Oral, Daily  pantoprazole, 40 mg, Oral, Early Morning  pregabalin, 200 mg, Oral, TID  vancomycin, 750 mg, Intravenous, Q12H      Continuous IV Infusions:  sodium chloride, 75 mL/hr, Intravenous, Continuous      PRN Meds:  acetaminophen, 650 mg, Oral, Q6H PRN  albuterol, 2 puff, Inhalation, Q6H PRN  ondansetron, 4 mg, Intravenous, Q6H PRN  oxyCODONE, 2.5 mg, Oral, Q4H PRN   Or  oxyCODONE, 5 mg, Oral, Q4H PRN  senna, 2 tablet, Oral, Daily PRN    scd    IP CONSULT TO PHARMACY  IP CONSULT TO PODIATRY    Network Utilization Review Department  ATTENTION: Please call with any questions or concerns to [phone] and carefully listen to the prompts so that you are directed to the right person. All voicemails are confidential.   For Discharge needs, contact Care Management DC Support Team at [phone] opt. 2  Send all requests for admission clinical reviews, approved or denied determinations and any other requests to dedicated fax number below belonging to the campus where the patient is receiving treatment. List of dedicated fax numbers for the Facilities:  FACILITY NAME UR FAX NUMBER   ADMISSION DENIALS  (Administrative/Medical Necessity) [phone]   DISCHARGE SUPPORT TEAM (NETWORK) [phone]   PARENT CHILD HEALTH (Maternity/NICU/Pediatrics) [phone]   General acute hospital [phone]   [facility] [phone]   [facility] [phone]   [facility] [phone]   [facility] [phone]   [facility] [phone]   [facility] [phone]   [facility] [phone]   [facility] [phone]   [facility] [phone]   [facility] [phone]   [facility] [phone]

## 2024-02-14 NOTE — DISCHARGE INSTR - OTHER ORDERS
Skin Care Plan:  1-Right Groin Wound: per vascular recommendations    2-Turn/reposition q2h or when medically stable for pressure re-distribution on skin .  3-Elevate heels to offload pressure.  4-Moisturize skin daily with skin nourishing cream  5-Ehob cushion in chair when out of bed.  6-RLE and Right Foot: per podiatry recommendations.   7-Cleanse Sacro-Buttocks and Left Heel with soap and water. Pat dry. Apply Silicone Border Foam (Mepilex) to areas. Gibson with P for Prevention and change every 3 days or PRN soilage/displacement. Peel back and inspect Q-shift.    Discharge Instructions - Podiatry    Weight Bearing Status: Non-weight bearing to right lower extremity                    Pain: Continue analgesics as directed    Follow-up appointment instructions: Please make an appointment within one week of discharge with [facility]'s Wound Care. Contact sooner if any increase in pain, or signs of infection occur    Wound Care: Leave dressings clean, dry, and intact between professional dressing changes    Nursing Instructions: Please apply Wound Vac Dresing applied at 125mmHg. Then cover with Gauze and secure with Kerlix and tape. Please change dressing every Monday, Wednesday, and Friday .

## 2024-02-14 NOTE — ASSESSMENT & PLAN NOTE
Pt noted to have fever and leukocytosis today, evaluated by vascular surgery with suspicion for a surgical site infection of her groin  Continue cefepime and will add vancomycin  Consult pharmacy for vancomycin dosing  Possible OR for washout  Await cultures drawn at [facility]

## 2024-02-14 NOTE — PLAN OF CARE
Problem: Prexisting or High Potential for Compromised Skin Integrity  Goal: Skin integrity is maintained or improved  Description: INTERVENTIONS:  - Identify patients at risk for skin breakdown  - Assess and monitor skin integrity  - Assess and monitor nutrition and hydration status  - Monitor labs   - Assess for incontinence   - Turn and reposition patient  - Assist with mobility/ambulation  - Relieve pressure over bony prominences  - Avoid friction and shearing  - Provide appropriate hygiene as needed including keeping skin clean and dry  - Evaluate need for skin moisturizer/barrier cream  - Collaborate with interdisciplinary team   - Patient/family teaching  - Consider wound care consult   Outcome: Progressing     Problem: SKIN/TISSUE INTEGRITY - ADULT  Goal: Incision(s), wounds(s) or drain site(s) healing without S/S of infection  Description: INTERVENTIONS  - Assess and document dressing, incision, wound bed, drain sites and surrounding tissue  - Provide patient and family education  - Perform skin care/dressing changes every shift  Outcome: Progressing     Problem: INFECTION - ADULT  Goal: Absence or prevention of progression during hospitalization  Description: INTERVENTIONS:  - Assess and monitor for signs and symptoms of infection  - Monitor lab/diagnostic results  - Monitor all insertion sites, i.e. indwelling lines, tubes, and drains  - Monitor endotracheal if appropriate and nasal secretions for changes in amount and color  - Herald appropriate cooling/warming therapies per order  - Administer medications as ordered  - Instruct and encourage patient and family to use good hand hygiene technique  - Identify and instruct in appropriate isolation precautions for identified infection/condition  Outcome: Progressing

## 2024-02-14 NOTE — UTILIZATION REVIEW
NOTIFICATION OF INPATIENT ADMISSION   AUTHORIZATION REQUEST   SERVICING FACILITY:   Critical access hospital  Address: [address] [place] [address]  Tax ID: [id number]  NPI: [id number] ATTENDING PROVIDER:  Attending Name and NPI#: Donald Grimm Md [[id number]]  Address: [address] [place] [address]  Phone: [phone]   ADMISSION INFORMATION:  Place of Service: Inpatient [facility]  Place of Service Code: 21  Inpatient Admission Date/Time: 2/13/24 11:30 PM  Discharge Date/Time: No discharge date for patient encounter.  Admitting Diagnosis Code/Description:  Diabetic foot ulcer with osteomyelitis (HCC) [E11.621, E11.69, L97.509, M86.9]     UTILIZATION REVIEW CONTACT:  Mehdi Morrison Utilization   Network Utilization Review Department  Phone: [phone]  Fax: [phone]  Email: [email or web address]  Contact for approvals/pending authorizations, clinical reviews, and discharge.     PHYSICIAN ADVISORY SERVICES:  Medical Necessity Denial & Pgeo-xs-Gmfh Review  Phone: [phone]  Fax: [phone]  Email: [email or web address]     DISCHARGE SUPPORT TEAM:  For Patients Discharge Needs & Updates  Phone: [phone] opt. 2 Fax: [phone]  Email: [email or web address]

## 2024-02-14 NOTE — ASSESSMENT & PLAN NOTE
Pt with leukocytosis today and fever, cultures drawn earlier today and started on cefepime  D/w podiatry and vascular surgery as above  Currently there is concern for a surgical site infection in the groin which will need a washout  Will transfer to inpatient hospital at [facility] and consult with vascular surgery for a washout  Will add vancomycin

## 2024-02-14 NOTE — ASSESSMENT & PLAN NOTE
Fever/leukocytosis at [facility] prior to arrival here, additionally with lactic acidosis resolved following IV fluids  Concern for surgical site infection at groin, plan as per primary problem with potential OR with vascular surgery for washout 2/14/2024  Received cefepime/vancomycin at [facility] prior to transfer to medical floor, continue for now as above and follow-up results of blood cultures x 2  Trend procalcitonin, WBC, temperature curve, hemodynamics.  Supportive cares otherwise as appropriate.

## 2024-02-14 NOTE — ASSESSMENT & PLAN NOTE
Contacted podiatry service, she was seen yesterday with photos in the chart, no concerns for infection at that time.  Podiatry to evaluate again tomorrow for wound vac change and will investigate further for infection

## 2024-02-15 LAB
ANION GAP SERPL CALCULATED.3IONS-SCNC: 4 MMOL/L
BASOPHILS # BLD AUTO: 0.01 THOUSANDS/ÂΜL (ref 0–0.1)
BASOPHILS NFR BLD AUTO: 0 % (ref 0–1)
BUN SERPL-MCNC: 15 MG/DL (ref 5–25)
CALCIUM SERPL-MCNC: 9.2 MG/DL (ref 8.4–10.2)
CHLORIDE SERPL-SCNC: 105 MMOL/L (ref 96–108)
CO2 SERPL-SCNC: 27 MMOL/L (ref 21–32)
CREAT SERPL-MCNC: 0.41 MG/DL (ref 0.6–1.3)
EOSINOPHIL # BLD AUTO: 0 THOUSAND/ÂΜL (ref 0–0.61)
EOSINOPHIL NFR BLD AUTO: 0 % (ref 0–6)
ERYTHROCYTE [DISTWIDTH] IN BLOOD BY AUTOMATED COUNT: 14.6 % (ref 11.6–15.1)
GFR SERPL CREATININE-BSD FRML MDRD: 99 ML/MIN/1.73SQ M
GLUCOSE SERPL-MCNC: 110 MG/DL (ref 65–140)
GLUCOSE SERPL-MCNC: 159 MG/DL (ref 65–140)
GLUCOSE SERPL-MCNC: 167 MG/DL (ref 65–140)
GLUCOSE SERPL-MCNC: 177 MG/DL (ref 65–140)
GLUCOSE SERPL-MCNC: 254 MG/DL (ref 65–140)
HCT VFR BLD AUTO: 31.8 % (ref 34.8–46.1)
HGB BLD-MCNC: 9.8 G/DL (ref 11.5–15.4)
IMM GRANULOCYTES # BLD AUTO: 0.06 THOUSAND/UL (ref 0–0.2)
IMM GRANULOCYTES NFR BLD AUTO: 1 % (ref 0–2)
LYMPHOCYTES # BLD AUTO: 1.15 THOUSANDS/ÂΜL (ref 0.6–4.47)
LYMPHOCYTES NFR BLD AUTO: 9 % (ref 14–44)
MCH RBC QN AUTO: 30.7 PG (ref 26.8–34.3)
MCHC RBC AUTO-ENTMCNC: 30.8 G/DL (ref 31.4–37.4)
MCV RBC AUTO: 100 FL (ref 82–98)
MONOCYTES # BLD AUTO: 0.33 THOUSAND/ÂΜL (ref 0.17–1.22)
MONOCYTES NFR BLD AUTO: 3 % (ref 4–12)
MRSA NOSE QL CULT: NORMAL
NEUTROPHILS # BLD AUTO: 10.86 THOUSANDS/ÂΜL (ref 1.85–7.62)
NEUTS SEG NFR BLD AUTO: 87 % (ref 43–75)
NRBC BLD AUTO-RTO: 0 /100 WBCS
PLATELET # BLD AUTO: 347 THOUSANDS/UL (ref 149–390)
PMV BLD AUTO: 9.3 FL (ref 8.9–12.7)
POTASSIUM SERPL-SCNC: 4.3 MMOL/L (ref 3.5–5.3)
PROCALCITONIN SERPL-MCNC: 0.39 NG/ML
RBC # BLD AUTO: 3.19 MILLION/UL (ref 3.81–5.12)
SODIUM SERPL-SCNC: 136 MMOL/L (ref 135–147)
VANCOMYCIN SERPL-MCNC: 11.3 UG/ML (ref 10–20)
WBC # BLD AUTO: 12.41 THOUSAND/UL (ref 4.31–10.16)

## 2024-02-15 PROCEDURE — 99232 SBSQ HOSP IP/OBS MODERATE 35: CPT | Performed by: NURSE PRACTITIONER

## 2024-02-15 PROCEDURE — 84145 PROCALCITONIN (PCT): CPT

## 2024-02-15 PROCEDURE — 80048 BASIC METABOLIC PNL TOTAL CA: CPT

## 2024-02-15 PROCEDURE — 99221 1ST HOSP IP/OBS SF/LOW 40: CPT | Performed by: INTERNAL MEDICINE

## 2024-02-15 PROCEDURE — 80202 ASSAY OF VANCOMYCIN: CPT

## 2024-02-15 PROCEDURE — 99024 POSTOP FOLLOW-UP VISIT: CPT | Performed by: SURGERY

## 2024-02-15 PROCEDURE — 82948 REAGENT STRIP/BLOOD GLUCOSE: CPT

## 2024-02-15 PROCEDURE — 85025 COMPLETE CBC W/AUTO DIFF WBC: CPT

## 2024-02-15 RX ORDER — SENNOSIDES 8.6 MG
2 TABLET ORAL DAILY PRN
Status: DISCONTINUED | OUTPATIENT
Start: 2024-02-15 | End: 2024-02-21

## 2024-02-15 RX ORDER — POLYETHYLENE GLYCOL 3350 17 G/17G
17 POWDER, FOR SOLUTION ORAL DAILY
Status: DISCONTINUED | OUTPATIENT
Start: 2024-02-15 | End: 2024-02-23 | Stop reason: HOSPADM

## 2024-02-15 RX ORDER — BISACODYL 10 MG
10 SUPPOSITORY, RECTAL RECTAL DAILY PRN
Status: DISCONTINUED | OUTPATIENT
Start: 2024-02-15 | End: 2024-02-20

## 2024-02-15 RX ADMIN — ACETAMINOPHEN 975 MG: 325 TABLET, FILM COATED ORAL at 05:35

## 2024-02-15 RX ADMIN — DOCUSATE SODIUM 100 MG: 100 CAPSULE, LIQUID FILLED ORAL at 17:04

## 2024-02-15 RX ADMIN — OMEGA-3 FATTY ACIDS CAP 1000 MG 1000 MG: 1000 CAP at 08:35

## 2024-02-15 RX ADMIN — DOCUSATE SODIUM 100 MG: 100 CAPSULE, LIQUID FILLED ORAL at 08:35

## 2024-02-15 RX ADMIN — RIVAROXABAN 2.5 MG: 2.5 TABLET, FILM COATED ORAL at 10:39

## 2024-02-15 RX ADMIN — HEPARIN SODIUM 5000 UNITS: 5000 INJECTION INTRAVENOUS; SUBCUTANEOUS at 05:35

## 2024-02-15 RX ADMIN — CEFEPIME 1000 MG: 1 INJECTION, POWDER, FOR SOLUTION INTRAMUSCULAR; INTRAVENOUS at 05:32

## 2024-02-15 RX ADMIN — ATORVASTATIN CALCIUM 10 MG: 10 TABLET, FILM COATED ORAL at 16:03

## 2024-02-15 RX ADMIN — PREGABALIN 200 MG: 100 CAPSULE ORAL at 08:35

## 2024-02-15 RX ADMIN — INSULIN LISPRO 1 UNITS: 100 INJECTION, SOLUTION INTRAVENOUS; SUBCUTANEOUS at 11:43

## 2024-02-15 RX ADMIN — HEPARIN SODIUM 5000 UNITS: 5000 INJECTION INTRAVENOUS; SUBCUTANEOUS at 22:01

## 2024-02-15 RX ADMIN — METHOCARBAMOL 250 MG: 500 TABLET ORAL at 13:04

## 2024-02-15 RX ADMIN — HEPARIN SODIUM 5000 UNITS: 5000 INJECTION INTRAVENOUS; SUBCUTANEOUS at 13:04

## 2024-02-15 RX ADMIN — SODIUM CHLORIDE 75 ML/HR: 0.9 INJECTION, SOLUTION INTRAVENOUS at 12:46

## 2024-02-15 RX ADMIN — VANCOMYCIN HYDROCHLORIDE 1500 MG: 10 INJECTION, POWDER, LYOPHILIZED, FOR SOLUTION INTRAVENOUS at 10:39

## 2024-02-15 RX ADMIN — RIVAROXABAN 2.5 MG: 2.5 TABLET, FILM COATED ORAL at 20:17

## 2024-02-15 RX ADMIN — INSULIN LISPRO 1 UNITS: 100 INJECTION, SOLUTION INTRAVENOUS; SUBCUTANEOUS at 08:35

## 2024-02-15 RX ADMIN — OXYBUTYNIN CHLORIDE 5 MG: 5 TABLET, EXTENDED RELEASE ORAL at 08:35

## 2024-02-15 RX ADMIN — METHOCARBAMOL 250 MG: 500 TABLET ORAL at 22:00

## 2024-02-15 RX ADMIN — ACETAMINOPHEN 975 MG: 325 TABLET, FILM COATED ORAL at 13:04

## 2024-02-15 RX ADMIN — NICOTINE 1 PATCH: 14 PATCH, EXTENDED RELEASE TRANSDERMAL at 08:41

## 2024-02-15 RX ADMIN — CLONAZEPAM 1 MG: 1 TABLET ORAL at 17:04

## 2024-02-15 RX ADMIN — ACETAMINOPHEN 975 MG: 325 TABLET, FILM COATED ORAL at 22:00

## 2024-02-15 RX ADMIN — PANTOPRAZOLE SODIUM 40 MG: 40 TABLET, DELAYED RELEASE ORAL at 08:35

## 2024-02-15 RX ADMIN — METHOCARBAMOL 250 MG: 500 TABLET ORAL at 05:35

## 2024-02-15 RX ADMIN — CEFEPIME 1000 MG: 1 INJECTION, POWDER, FOR SOLUTION INTRAMUSCULAR; INTRAVENOUS at 17:04

## 2024-02-15 RX ADMIN — VANCOMYCIN HYDROCHLORIDE 1500 MG: 10 INJECTION, POWDER, LYOPHILIZED, FOR SOLUTION INTRAVENOUS at 22:06

## 2024-02-15 RX ADMIN — PREGABALIN 200 MG: 100 CAPSULE ORAL at 16:03

## 2024-02-15 RX ADMIN — ASPIRIN 81 MG: 81 TABLET, COATED ORAL at 08:35

## 2024-02-15 RX ADMIN — BUPROPION HYDROCHLORIDE 300 MG: 150 TABLET, EXTENDED RELEASE ORAL at 08:35

## 2024-02-15 RX ADMIN — PREGABALIN 200 MG: 100 CAPSULE ORAL at 22:00

## 2024-02-15 NOTE — QUICK NOTE
"Post Op Check Note - Vascular Surgery   Lona Cedeno 78 y.o. female MRN: [id number]  Unit/Bed#: [facility] 815-01 Encounter: [id number]    ASSESSMENT:  Lona Cedeno is a 78 y.o. female who is status post right groin washout and vac placement.     Subjective: Patient seen and evaluated at bedside. Reports feeling tired but denies any significant pain.     Physical Exam:  GEN: NAD  CV: RRR  Lung: Normal effort  Ab: Soft, NT/ND  Neuro: A+Ox3  Incisions: Clean dry and intact. Vac with appropriate seal.   Pulses: BL Dop DP/PT    Blood pressure 114/53, pulse 70, temperature (!) 97.4 °F (36.3 °C), resp. rate 14, height 5' 1\" (1.549 m), weight 68.9 kg (151 lb 14.4 oz), SpO2 96%.,Body mass index is 28.7 kg/m².      Intake/Output Summary (Last 24 hours) at 2/14/2024 2133  Last data filed at 2/14/2024 1720  Gross per 24 hour   Intake 1078.75 ml   Output 2050 ml   Net -971.25 ml       Invasive Devices       Peripheral Intravenous Line  Duration             Peripheral IV 02/14/24 Distal;Right;Ventral (anterior) Forearm <1 day              Drain  Duration             Ureteral Internal Stent Left ureter 6 Fr. 86 days                    VTE Pharmacologic Prophylaxis: Sequential compression device (Venodyne)             "

## 2024-02-15 NOTE — PLAN OF CARE
Problem: Prexisting or High Potential for Compromised Skin Integrity  Goal: Skin integrity is maintained or improved  Description: INTERVENTIONS:  - Identify patients at risk for skin breakdown  - Assess and monitor skin integrity  - Assess and monitor nutrition and hydration status  - Monitor labs   - Assess for incontinence   - Turn and reposition patient  - Assist with mobility/ambulation  - Relieve pressure over bony prominences  - Avoid friction and shearing  - Provide appropriate hygiene as needed including keeping skin clean and dry  - Evaluate need for skin moisturizer/barrier cream  - Collaborate with interdisciplinary team   - Patient/family teaching  - Consider wound care consult   Outcome: Progressing     Problem: SKIN/TISSUE INTEGRITY - ADULT  Goal: Incision(s), wounds(s) or drain site(s) healing without S/S of infection  Description: INTERVENTIONS  - Assess and document dressing, incision, wound bed, drain sites and surrounding tissue  - Provide patient and family education  - Perform skin care/dressing changes every shift  Outcome: Progressing     Problem: INFECTION - ADULT  Goal: Absence or prevention of progression during hospitalization  Description: INTERVENTIONS:  - Assess and monitor for signs and symptoms of infection  - Monitor lab/diagnostic results  - Monitor all insertion sites, i.e. indwelling lines, tubes, and drains  - Monitor endotracheal if appropriate and nasal secretions for changes in amount and color  - [place] appropriate cooling/warming therapies per order  - Administer medications as ordered  - Instruct and encourage patient and family to use good hand hygiene technique  - Identify and instruct in appropriate isolation precautions for identified infection/condition  Outcome: Progressing

## 2024-02-15 NOTE — PROGRESS NOTES
Lona Cedeno is a 78 y.o. female who is currently ordered Vancomycin IV with management by the Pharmacy Consult service.  Relevant clinical data and objective / subjective history reviewed.  Vancomycin Assessment:  Indication and Goal AUC/Trough: Soft tissue (goal -600, trough >10), -600, trough >10  Clinical Status: stable  Micro:     Renal Function:  SCr: 0.41 mg/dL  CrCl: 101 mL/min  Renal replacement: Not on dialysis  Days of Therapy: 3  Current Dose: 750mg iv q12h  Vancomycin Plan:  New Dosinmg iv q12h  Estimated AUC: 476 mcg*hr/mL  Estimated Trough: 13.1 mcg/mL  Next Level: / am labs  Renal Function Monitoring: Daily BMP and UOP  Pharmacy will continue to follow closely for s/sx of nephrotoxicity, infusion reactions and appropriateness of therapy.  BMP and CBC will be ordered per protocol. We will continue to follow the patient’s culture results and clinical progress daily.    Catalina Desai, Pharmacist

## 2024-02-15 NOTE — ASSESSMENT & PLAN NOTE
Lab Results   Component Value Date    HGBA1C 6.5 (H) 11/07/2023       Recent Labs     02/14/24  1602 02/14/24 2055 02/15/24  0734 02/15/24  1124   POCGLU 120 186* 159* 167*       Blood Sugar Average: Last 72 hrs:  (P) [id number]  hold metformin while inpatient  Diabetic diet  Fingersticks QID with sliding scale coverage

## 2024-02-15 NOTE — ASSESSMENT & PLAN NOTE
Continue cefepime and vancomycin  MRSA screen-pending  Vascular surgery following status post washout with VAC placement on 2/14/2024  Consult infectious disease pending  Follow or cultures

## 2024-02-15 NOTE — PLAN OF CARE
Problem: Prexisting or High Potential for Compromised Skin Integrity  Goal: Skin integrity is maintained or improved  Description: INTERVENTIONS:  - Identify patients at risk for skin breakdown  - Assess and monitor skin integrity  - Assess and monitor nutrition and hydration status  - Monitor labs   - Assess for incontinence   - Turn and reposition patient  - Assist with mobility/ambulation  - Relieve pressure over bony prominences  - Avoid friction and shearing  - Provide appropriate hygiene as needed including keeping skin clean and dry  - Evaluate need for skin moisturizer/barrier cream  - Collaborate with interdisciplinary team   - Patient/family teaching  - Consider wound care consult   Outcome: Progressing     Problem: SKIN/TISSUE INTEGRITY - ADULT  Goal: Incision(s), wounds(s) or drain site(s) healing without S/S of infection  Description: INTERVENTIONS  - Assess and document dressing, incision, wound bed, drain sites and surrounding tissue  - Provide patient and family education  - Perform skin care/dressing changes every shift  Outcome: Progressing     Problem: INFECTION - ADULT  Goal: Absence or prevention of progression during hospitalization  Description: INTERVENTIONS:  - Assess and monitor for signs and symptoms of infection  - Monitor lab/diagnostic results  - Monitor all insertion sites, i.e. indwelling lines, tubes, and drains  - Monitor endotracheal if appropriate and nasal secretions for changes in amount and color  - Mason appropriate cooling/warming therapies per order  - Administer medications as ordered  - Instruct and encourage patient and family to use good hand hygiene technique  - Identify and instruct in appropriate isolation precautions for identified infection/condition  Outcome: Progressing

## 2024-02-15 NOTE — PROGRESS NOTES
[facility]  Progress Note  Name: Lona Cedeno I  MRN: [id number]  Unit/Bed#: [facility] 815-01 I Date of Admission: 2/13/2024   Date of Service: 2/15/2024 I Hospital Day: 2    Assessment/Plan   * Surgical site infection  Assessment & Plan  Continue cefepime and vancomycin  MRSA screen-pending  Vascular surgery following status post washout with VAC placement on 2/14/2024  Consult infectious disease pending  Follow or cultures    Sepsis without acute organ dysfunction (HCC)  Assessment & Plan  Fever/leukocytosis at [facility], [place] prior to arrival here, additionally with lactic acidosis resolved following IV fluids  Concern for surgical site infection at groin, s/p waschout with vac placement on 2/14 with vascular surgery   Received cefepime/vancomycin at [facility], [place] prior to transfer to medical floor, continue for now- pending ID consult  Blood cultures negative x24 hours   Covid/flu/rsv negative   Wound culture prelim 2/14- 1+ disintegrating polys  Anaerobic culture 2/14/2024-pending  Urine culture-prelim> 100,000 enterobacter cloacae      Diabetic foot ulcer with osteomyelitis ([facility])  Assessment & Plan  Podiatry assistance appreciated  Wound vac MWF       Tobacco use disorder  Assessment & Plan  Continue with nicotine patch  Encourage cessation    COPD, severity to be determined ([facility])  Assessment & Plan  Respiratory status is stable  Continue maintenance inhalers and PRN bronchodilators    PAD (peripheral artery disease) ([facility])  Assessment & Plan  1/31 BYPASS AXILLO-FEMORAL, RIGHT WITH 8MM RINGED PTFE GRAFT   Continue aspirin, statin    Essential hypertension  Assessment & Plan  BP was low 2/14 post op- improved today   C/w holding amiloride, can restart later is indicated    Type 2 diabetes mellitus with diabetic polyneuropathy ([facility])  Assessment & Plan  Lab Results   Component Value Date    HGBA1C 6.5 (H) 11/07/2023       Recent Labs     02/14/24  1602 02/14/24  2055 02/15/24  0734 02/15/24  1124    POCGLU 120 186* 159* 167*       Blood Sugar Average: Last 72 hrs:  (P) [id number]  hold metformin while inpatient  Diabetic diet  Fingersticks QID with sliding scale coverage               VTE Pharmacologic Prophylaxis: VTE Score: 3 Moderate Risk (Score 3-4) - Pharmacological DVT Prophylaxis Ordered: heparin.    Mobility:   Basic Mobility Inpatient Raw Score: 14  -HLM Goal: 4: Move to chair/commode  JH-HLM Achieved: 3: Sit at edge of bed  HLM Goal NOT achieved. Continue with multidisciplinary rounding and encourage appropriate mobility to improve upon HLM goals.    Patient Centered Rounds: I performed bedside rounds with nursing staff today.   Discussions with Specialists or Other Care Team Provider: d/w RN     Education and Discussions with Family / Patient: Patient declined call to .     Total Time Spent on Date of Encounter in care of patient: 35 mins. This time was spent on one or more of the following: performing physical exam; counseling and coordination of care; obtaining or reviewing history; documenting in the medical record; reviewing/ordering tests, medications or procedures; communicating with other healthcare professionals and discussing with patient's family/caregivers.    Current Length of Stay: 2 day(s)  Current Patient Status: Inpatient   Certification Statement: The patient will continue to require additional inpatient hospital stay due to iv abxs  Discharge Plan: Anticipate discharge in >72 hrs to rehab facility.    Code Status: Level 3 - DNAR and DNI    Subjective:   Patient lying in bed.  Reports she is doing well denies any complaints.    Objective:     Vitals:   Temp (24hrs), Av.6 °F (36.4 °C), Min:97.3 °F (36.3 °C), Max:98 °F (36.7 °C)    Temp:  [97.3 °F (36.3 °C)-98 °F (36.7 °C)] 98 °F (36.7 °C)  HR:  [60-74] 66  Resp:  [12-22] 18  BP: ()/(39-72) 99/52  SpO2:  [93 %-98 %] 94 %  Body mass index is 28.7 kg/m².     Input and Output Summary (last 24 hours):      Intake/Output Summary (Last 24 hours) at 2/15/2024 1601  Last data filed at 2/15/2024 1246  Gross per 24 hour   Intake 2966.25 ml   Output 1800 ml   Net 1166.25 ml       Physical Exam:   Physical Exam  Vitals and nursing note reviewed.   Constitutional:       General: She is not in acute distress.     Appearance: She is well-developed. She is not ill-appearing.   Cardiovascular:      Rate and Rhythm: Normal rate and regular rhythm.      Heart sounds: Normal heart sounds. No murmur heard.  Pulmonary:      Effort: Pulmonary effort is normal. No respiratory distress.      Breath sounds: Normal breath sounds. No wheezing or rales.   Abdominal:      General: Bowel sounds are normal. There is distension.      Palpations: Abdomen is soft.      Tenderness: There is no abdominal tenderness.   Musculoskeletal:         General: No swelling.   Skin:     General: Skin is warm and dry.      Comments: Right groin wound VAC in place.  Right foot wound VAC in place with Ace wrap and boot noted   Neurological:      Mental Status: She is alert. Mental status is at baseline.   Psychiatric:         Mood and Affect: Mood normal.          Additional Data:     Labs:  Results from last 7 days   Lab Units 02/15/24  0602   WBC Thousand/uL 12.41*   HEMOGLOBIN g/dL 9.8*   HEMATOCRIT % 31.8*   PLATELETS Thousands/uL 347   NEUTROS PCT % 87*   LYMPHS PCT % 9*   MONOS PCT % 3*   EOS PCT % 0     Results from last 7 days   Lab Units 02/15/24  0602   SODIUM mmol/L 136   POTASSIUM mmol/L 4.3   CHLORIDE mmol/L 105   CO2 mmol/L 27   BUN mg/dL 15   CREATININE mg/dL 0.41*   ANION GAP mmol/L 4   CALCIUM mg/dL 9.2   GLUCOSE RANDOM mg/dL 177*     Results from last 7 days   Lab Units 02/14/24  0446   INR  1.16     Results from last 7 days   Lab Units 02/15/24  1124 02/15/24  0734 02/14/24  2055 02/14/24  1602 02/14/24  1149 02/14/24  0532 02/14/24  0015 02/13/24  2036 02/13/24  1608 02/13/24  1032 02/13/24  0617 02/12/24  2127   POC GLUCOSE mg/dl 167* 159*  186* 120 158* 100 179* 106 122 118 144* 135         Results from last 7 days   Lab Units 02/15/24  0602 02/14/24  0446 02/13/24  2129 02/13/24  1750 02/13/24  1427   LACTIC ACID mmol/L  --   --  1.7 2.3*  --    PROCALCITONIN ng/ml 0.39* 0.52*  --   --  0.06       Lines/Drains:  Invasive Devices       Peripheral Intravenous Line  Duration             Peripheral IV 02/14/24 Distal;Right;Ventral (anterior) Forearm <1 day              Drain  Duration             Ureteral Internal Stent Left ureter 6 Fr. 87 days                          Imaging: Reviewed radiology reports from this admission including: xray(s)    Recent Cultures (last 7 days):   Results from last 7 days   Lab Units 02/14/24  1539 02/14/24  1534 02/13/24  1920 02/13/24  1750   BLOOD CULTURE   --   --   --  No Growth at 24 hrs.  No Growth at 24 hrs.   GRAM STAIN RESULT  1+ Disintegrating polys*  1+ Gram positive rods* No Polys or Bacteria seen  --   --    URINE CULTURE   --   --  >100,000 cfu/ml Enterobacter cloacae*  --    WOUND CULTURE  Culture results to follow.  --   --   --        Last 24 Hours Medication List:   Current Facility-Administered Medications   Medication Dose Route Frequency Provider Last Rate    acetaminophen  975 mg Oral Q8H JILLIAN Overton MD      albuterol  2 puff Inhalation Q6H PRN Himanshu Overton MD      aspirin  81 mg Oral Daily Himanshu Overton MD      atorvastatin  10 mg Oral Daily With Dinner Himanshu Overton MD      bisacodyl  10 mg Rectal Daily PRN ROXANE Munoz      buPROPion  300 mg Oral Daily Himanshu Overton MD      cefepime  1,000 mg Intravenous Q12H Himanshu Overton MD 1,000 mg (02/15/24 0532)    clonazePAM  1 mg Oral QPM Himanshu Overton MD      docusate sodium  100 mg Oral BID Himanshu Overton MD      fish oil  1,000 mg Oral Daily Himanshu Overton MD      fluticasone-vilanterol  1 puff Inhalation Daily Himanshu Overton MD      heparin (porcine)  5,000 Units Subcutaneous Q8H [facility] Himanshu Overton MD      insulin  lispro  1-5 Units Subcutaneous TID With Meals Amrita Paul MD      methocarbamol  250 mg Oral Q8H JILLIAN Himanshu Overton MD      nicotine  1 patch Transdermal Daily Himanshu Overton MD      ondansetron  4 mg Intravenous Q6H PRN Himanshu Overton MD      oxybutynin  5 mg Oral Daily Himanshu Overton MD      oxyCODONE  2.5 mg Oral Q4H PRN Himanshu Overton MD      Or    oxyCODONE  5 mg Oral Q4H PRN Himanshu Overton MD      pantoprazole  40 mg Oral Early Morning Himanshu Overton MD      polyethylene glycol  17 g Oral Daily ROXANE Munoz      pregabalin  200 mg Oral TID Himanshu Overton MD      rivaroxaban  2.5 mg Oral BID With Meals Felipa Foy PA-C      senna  2 tablet Oral Daily PRN Enzo Elam DO      sodium chloride  75 mL/hr Intravenous Continuous Himanshu Overton MD 75 mL/hr (02/15/24 1246)    vancomycin  1,500 mg Intravenous Q12H Sary Qureshi PA-C          Today, Patient Was Seen By: ROXANE Munoz    **Please Note: This note may have been constructed using a voice recognition system.**

## 2024-02-15 NOTE — PROGRESS NOTES
Progress Note - Vascular Surgery   Lona Cedeno 78 y.o. female [id number]  Unit/Bed#:[facility] 815-01 Encounter: [id number]      Assessment:    78 y.o. with PMH HTN, HLD, COPD, DM, Right Ankle ORIF presenting with AIOD, Right CLTI c exposed, necrotic heel wound.     Vascular surgery consulted for AIOD, Right CLTI c exposed necrotic heel wound.     1/31: Right Ax to Fem PTFE bypass  2/14 Rt Groin washout and vac  2/4 Rt Axillary I&D, Pods Achilles debridement, partial 2nd to amp, Hematoma evac.     Plan:  - Doing well post-op; viable, well perfused limb with palpable pulses  - Cont VAC to Right groin; Change MWF  - OOB as tolerated  - Diet as tolerated  - Abx Vanco, Cefepime  - 2/14 Bcx: NGTD  - 2/14 WCX: Gram Pos, Polys   - 2/13 Ucx: Enterobacter  - No evidence of arterial prosthetic graft infection on wound exploration 2/14.   - Right chest wall incision c/d/I, seroma improving   - VTEppx: SQH  - Cont Aspirin, Statin  - Can resume Xarelto 2.5mg BID  - Further recommendations pending clinical course     Subjective:    Pt s/e. No acute events overnight. Pt awake, alert.    Tolerating diet. Not OOB. Voiding.      Pain controlled. Admits to pain at surgical site.     No new complaints. Denies n/v, sob, chest pain, f/c. No sensory change, numbness, or weakness of lower extremities    I/Os:  I/O last 3 completed shifts:  In: 1078.8 [I.V.:378.8; IV Piggyback:700]  Out: 3050 [Urine:3050]  No intake/output data recorded.    Review of Systems   All other systems reviewed and are negative.      Vitals:    02/14/24 2242   BP: 105/54   Pulse: 67   Resp: 18   Temp: (!) 97.3 °F (36.3 °C)   SpO2: 93%        Physical Exam  Vitals and nursing note reviewed.   Constitutional:       General: She is not in acute distress.     Appearance: She is well-developed. She is not diaphoretic.   HENT:      Head: Normocephalic and atraumatic.   Eyes:      Conjunctiva/sclera: Conjunctivae normal.   Cardiovascular:      Rate and Rhythm: Normal rate and  regular rhythm.      Heart sounds: No murmur heard.     Comments: RUE Palp brachial, radial pulse     LLE non-palp fem  RLE palp  Pop  Right ax-fem dop signal  RLE Dop DP  Pulmonary:      Effort: Pulmonary effort is normal. No respiratory distress.      Breath sounds: Normal breath sounds.   Abdominal:      Palpations: Abdomen is soft.      Tenderness: There is no abdominal tenderness.   Musculoskeletal:         General: Signs of injury present. No swelling.      Cervical back: Neck supple.      Comments: Right heel VAC  Right groin VAC  Right chest wall incision soft, c/d/I     Skin:     General: Skin is warm and dry.      Capillary Refill: Capillary refill takes less than 2 seconds.   Neurological:      General: No focal deficit present.      Mental Status: She is alert and oriented to person, place, and time. Mental status is at baseline.   Psychiatric:         Mood and Affect: Mood normal.         Imaging:I have personally reviewed pertinent reports.      Lab Results and Cultures:   Lab Results   Component Value Date    WBC 12.41 (H) 02/15/2024    HGB 9.8 (L) 02/15/2024    HCT 31.8 (L) 02/15/2024     (H) 02/15/2024     02/15/2024     Lab Results   Component Value Date    GLUCOSE 143 (H) 01/31/2024    CALCIUM 9.2 02/15/2024     05/21/2018    K 4.3 02/15/2024    CO2 27 02/15/2024     02/15/2024    BUN 15 02/15/2024    CREATININE 0.41 (L) 02/15/2024     Lab Results   Component Value Date    INR 1.16 02/14/2024    INR 1.03 02/04/2024    INR 1.11 02/03/2024    PROTIME 14.7 (H) 02/14/2024    PROTIME 13.4 02/04/2024    PROTIME 14.2 02/03/2024        Blood Culture:   Lab Results   Component Value Date    BLOODCX No Growth at 24 hrs. 02/13/2024    BLOODCX No Growth at 24 hrs. 02/13/2024   ,   Urinalysis:   Lab Results   Component Value Date    COLORU Light Yellow 02/14/2024    COLORU YELLOW 05/21/2018    CLARITYU Clear 02/14/2024    CLARITYU CLEAR 05/21/2018    SPECGRAV 1.009 02/14/2024     SPECGRAV 1.015 05/21/2018    PHUR 5.5 02/14/2024    PHUR 6.0 05/21/2018    LEUKOCYTESUR Large (A) 02/14/2024    LEUKOCYTESUR NEGATIVE 05/21/2018    NITRITE Negative 02/14/2024    NITRITE NEGATIVE 05/21/2018    PROTEINUA NEGATIVE 05/21/2018    GLUCOSEU Negative 02/14/2024    GLUCOSEU NEGATIVE 05/21/2018    KETONESU Negative 02/14/2024    KETONESU NEGATIVE 05/21/2018    BILIRUBINUR Negative 02/14/2024    BILIRUBINUR NEGATIVE 05/21/2018    BLOODU Negative 02/14/2024    BLOODU NEGATIVE 05/21/2018   ,   Urine Culture:   Lab Results   Component Value Date    URINECX >100,000 cfu/ml Enterobacter cloacae (A) 02/13/2024   ,   Wound Culure:   Lab Results   Component Value Date    WOUNDCULT 1+ Growth of 01/23/2024       Medications:  Current Facility-Administered Medications   Medication Dose Route Frequency    acetaminophen (TYLENOL) tablet 975 mg  975 mg Oral Q8H JILLIAN    albuterol (PROVENTIL HFA,VENTOLIN HFA) inhaler 2 puff  2 puff Inhalation Q6H PRN    aspirin (ECOTRIN LOW STRENGTH) EC tablet 81 mg  81 mg Oral Daily    atorvastatin (LIPITOR) tablet 10 mg  10 mg Oral Daily With Dinner    buPROPion (WELLBUTRIN XL) 24 hr tablet 300 mg  300 mg Oral Daily    cefepime (MAXIPIME) 1,000 mg in dextrose 5 % 50 mL IVPB  1,000 mg Intravenous Q12H    clonazePAM (KlonoPIN) tablet 1 mg  1 mg Oral QPM    docusate sodium (COLACE) capsule 100 mg  100 mg Oral BID    fish oil capsule 1,000 mg  1,000 mg Oral Daily    fluticasone-vilanterol 200-25 mcg/actuation 1 puff  1 puff Inhalation Daily    heparin (porcine) subcutaneous injection 5,000 Units  5,000 Units Subcutaneous Q8H [facility]    insulin lispro (HumaLOG) 100 units/mL subcutaneous injection 1-5 Units  1-5 Units Subcutaneous TID With Meals    lactated ringers bolus 500 mL  500 mL Intravenous Once PRN    And    lactated ringers bolus 500 mL  500 mL Intravenous Once PRN    methocarbamol (ROBAXIN) tablet 250 mg  250 mg Oral Q8H JILLIAN    nicotine (NICODERM CQ) 14 mg/24hr TD 24 hr patch 1 patch  1  patch Transdermal Daily    ondansetron (ZOFRAN) injection 4 mg  4 mg Intravenous Q6H PRN    oxybutynin (DITROPAN-XL) 24 hr tablet 5 mg  5 mg Oral Daily    oxyCODONE (ROXICODONE) split tablet 2.5 mg  2.5 mg Oral Q4H PRN    Or    oxyCODONE (ROXICODONE) IR tablet 5 mg  5 mg Oral Q4H PRN    pantoprazole (PROTONIX) EC tablet 40 mg  40 mg Oral Early Morning    pregabalin (LYRICA) capsule 200 mg  200 mg Oral TID    senna (SENOKOT) tablet 17.2 mg  2 tablet Oral Daily PRN    sodium chloride 0.9 % bolus 500 mL  500 mL Intravenous Once PRN    And    sodium chloride 0.9 % bolus 500 mL  500 mL Intravenous Once PRN    sodium chloride 0.9 % infusion  75 mL/hr Intravenous Continuous    vancomycin (VANCOCIN) IVPB (premix in dextrose) 750 mg 150 mL  750 mg Intravenous Q12H       Patient Active Problem List   Diagnosis    Type 2 diabetes mellitus with diabetic polyneuropathy (HCC)    Essential hypertension    Anxiety and depression    Medicare annual wellness visit, subsequent    GERD without esophagitis    Urinary incontinence    Degenerative lumbar disc    Lactic acidosis    Hypomagnesemia    Bladder neoplasm    Left renal mass    PAD (peripheral artery disease) (HCC)    Abnormal CT of the chest    COPD, severity to be determined (HCC)    Tobacco use disorder    Age-related osteoporosis without current pathological fracture    Ankle ulcer, right, with fat layer exposed (HCC)    Diabetic foot ulcer with osteomyelitis (HCC)    Preoperative cardiovascular examination    Constipation    Hematoma    At risk for venous thromboembolism (VTE)    At high risk for skin breakdown    Wound of skin    Impaired mobility and activities of daily living    Acute pain due to injury    Leukocytosis    Surgical site infection    Sepsis without acute organ dysfunction (HCC)       Past Surgical History:   Procedure Laterality Date    ANKLE FRACTURE SURGERY Right     has screw in place     SECTION      x2    CHOLECYSTECTOMY      CYSTOSCOPY W/  LASER LITHOTRIPSY Left 9/18/2023    Procedure: CYSTOSCOPY; RETROGRADE; URETEROSCOPY; BIOPSY; LEFT -INSERTION OF STENT;  Surgeon: Cristian Child MD;  Location: [facility];  Service: Urology    CYSTOSCOPY W/ LASER LITHOTRIPSY Left 11/20/2023    Procedure: CYSTOSCOPY; RETROGRADE; URETEROSCOPY; 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;  Surgeon: Cristian Child MD;  Location: [facility];  Service: Urology    EVACUATION OF HEMATOMA Right 2/4/2024    Procedure: EVACUATION/ DRAINAGE CHEST WALL  HEMATOMA;  Surgeon: Seth Hoyos MD;  Location: [facility];  Service: Vascular    FL RETROGRADE PYELOGRAM  9/18/2023    HERNIA REPAIR      umbilicus w/ mesh    CO BYPASS W/VEIN AXILLARY-FEMORAL Right 1/31/2024    Procedure: BYPASS AXILLO-FEMORAL, RIGHT WITH 8MM RINGED PTFE GRAFT;  Surgeon: Seth Hoyos MD;  Location: [facility];  Service: Vascular    WOUND DEBRIDEMENT Right 2/4/2024    Procedure: RIGHT WOUND AND ACHILLES DEBRIDEMENT FOOT/TOE (WASH OUT); PARTIAL AMPUTATION DISTAL 2ND TOE;  Surgeon: Aaron Montero DPM;  Location: [facility];  Service: Podiatry       Family History   Problem Relation Age of Onset    COPD Mother     Emphysema Mother     COPD Father     Emphysema Father        Social History     Socioeconomic History    Marital status: /Civil Union     Spouse name: Not on file    Number of children: Not on file    Years of education: Not on file    Highest education level: Not on file   Occupational History    Not on file   Tobacco Use    Smoking status: Every Day     Current packs/day: 1.00     Average packs/day: 1 pack/day for 63.1 years (63.1 ttl pk-yrs)     Types: Cigarettes     Start date: 1961    Smokeless tobacco: Never    Tobacco comments:     11/14/23 smokes about 3/4 PPD   Vaping Use    Vaping status: Never Used   Substance and Sexual Activity    Alcohol use: Not Currently    Drug use: Never    Sexual activity: Not Currently   Other Topics Concern    Not on file    Social History Narrative    Not on file     Social Determinants of Health     Financial Resource Strain: Low Risk  (2/9/2023)    Overall Financial Resource Strain (CARDIA)     Difficulty of Paying Living Expenses: Not hard at all   Food Insecurity: No Food Insecurity (1/24/2024)    Hunger Vital Sign     Worried About Running Out of Food in the Last Year: Never true     Ran Out of Food in the Last Year: Never true   Transportation Needs: No Transportation Needs (1/24/2024)    PRAPARE - Transportation     Lack of Transportation (Medical): No     Lack of Transportation (Non-Medical): No   Physical Activity: Not on file   Stress: Not on file   Social Connections: Not on file   Intimate Partner Violence: Not on file   Housing Stability: Low Risk  (1/24/2024)    Housing Stability Vital Sign     Unable to Pay for Housing in the Last Year: No     Number of Places Lived in the Last Year: 1     Unstable Housing in the Last Year: No       Allergies   Allergen Reactions    Paroxetine Other (See Comments)     Became suicidal    Adhesive [Medical Tape] Itching and Blisters    Carafate [Sucralfate] Other (See Comments)     Mouth sores, tongue sore    Sulfa Antibiotics Hives and Rash    Sulfamethoxazole-Trimethoprim Hives

## 2024-02-15 NOTE — ASSESSMENT & PLAN NOTE
Fever/leukocytosis at [facility] prior to arrival here, additionally with lactic acidosis resolved following IV fluids  Concern for surgical site infection at groin, s/p waschout with vac placement on 2/14 with vascular surgery   Received cefepime/vancomycin at [facility] prior to transfer to medical floor, continue for now- pending ID consult  Blood cultures negative x24 hours   Covid/flu/rsv negative   Wound culture prelim 2/14- 1+ disintegrating polys  Anaerobic culture 2/14/2024-pending  Urine culture-prelim> 100,000 enterobacter cloacae

## 2024-02-16 PROBLEM — E53.8 B12 DEFICIENCY: Status: ACTIVE | Noted: 2024-02-16

## 2024-02-16 PROBLEM — D51.3 OTHER DIETARY VITAMIN B12 DEFICIENCY ANEMIA: Status: ACTIVE | Noted: 2024-02-16

## 2024-02-16 LAB
ANION GAP SERPL CALCULATED.3IONS-SCNC: 6 MMOL/L
BACTERIA SPEC ANAEROBE CULT: NORMAL
BACTERIA UR CULT: ABNORMAL
BLAIMP ISLT/SPM QL: NOT DETECTED
BLAKPC ISLT/SPM QL: NOT DETECTED
BLAOXA-48 ISLT/SPM QL: NOT DETECTED
BLAVIM ISLT/SPM QL: NOT DETECTED
BUN SERPL-MCNC: 12 MG/DL (ref 5–25)
CALCIUM SERPL-MCNC: 8.7 MG/DL (ref 8.4–10.2)
CHLORIDE SERPL-SCNC: 105 MMOL/L (ref 96–108)
CO2 SERPL-SCNC: 26 MMOL/L (ref 21–32)
CREAT SERPL-MCNC: 0.56 MG/DL (ref 0.6–1.3)
ERYTHROCYTE [DISTWIDTH] IN BLOOD BY AUTOMATED COUNT: 14.7 % (ref 11.6–15.1)
FERRITIN SERPL-MCNC: 146 NG/ML (ref 11–307)
FOLATE SERPL-MCNC: 10.4 NG/ML
GFR SERPL CREATININE-BSD FRML MDRD: 90 ML/MIN/1.73SQ M
GLUCOSE SERPL-MCNC: 132 MG/DL (ref 65–140)
GLUCOSE SERPL-MCNC: 157 MG/DL (ref 65–140)
GLUCOSE SERPL-MCNC: 164 MG/DL (ref 65–140)
GLUCOSE SERPL-MCNC: 168 MG/DL (ref 65–140)
GLUCOSE SERPL-MCNC: 196 MG/DL (ref 65–140)
HCT VFR BLD AUTO: 31.4 % (ref 34.8–46.1)
HGB BLD-MCNC: 9.6 G/DL (ref 11.5–15.4)
IRON SATN MFR SERPL: 25 % (ref 15–50)
IRON SERPL-MCNC: 63 UG/DL (ref 50–212)
MCH RBC QN AUTO: 30.5 PG (ref 26.8–34.3)
MCHC RBC AUTO-ENTMCNC: 30.6 G/DL (ref 31.4–37.4)
MCV RBC AUTO: 100 FL (ref 82–98)
NDM CEPHEID: NOT DETECTED
PLATELET # BLD AUTO: 354 THOUSANDS/UL (ref 149–390)
PMV BLD AUTO: 9.1 FL (ref 8.9–12.7)
POTASSIUM SERPL-SCNC: 4.1 MMOL/L (ref 3.5–5.3)
RBC # BLD AUTO: 3.15 MILLION/UL (ref 3.81–5.12)
SODIUM SERPL-SCNC: 137 MMOL/L (ref 135–147)
TIBC SERPL-MCNC: 257 UG/DL (ref 250–450)
UIBC SERPL-MCNC: 194 UG/DL (ref 155–355)
VIT B12 SERPL-MCNC: 93 PG/ML (ref 180–914)
WBC # BLD AUTO: 10.95 THOUSAND/UL (ref 4.31–10.16)

## 2024-02-16 PROCEDURE — 97605 NEG PRS WND THER DME<=50SQCM: CPT | Performed by: PHYSICIAN ASSISTANT

## 2024-02-16 PROCEDURE — 82728 ASSAY OF FERRITIN: CPT | Performed by: NURSE PRACTITIONER

## 2024-02-16 PROCEDURE — 85027 COMPLETE CBC AUTOMATED: CPT | Performed by: PHYSICIAN ASSISTANT

## 2024-02-16 PROCEDURE — 83540 ASSAY OF IRON: CPT | Performed by: NURSE PRACTITIONER

## 2024-02-16 PROCEDURE — 83550 IRON BINDING TEST: CPT | Performed by: NURSE PRACTITIONER

## 2024-02-16 PROCEDURE — NC001 PR NO CHARGE: Performed by: SURGERY

## 2024-02-16 PROCEDURE — 99232 SBSQ HOSP IP/OBS MODERATE 35: CPT | Performed by: NURSE PRACTITIONER

## 2024-02-16 PROCEDURE — 82948 REAGENT STRIP/BLOOD GLUCOSE: CPT

## 2024-02-16 PROCEDURE — 80048 BASIC METABOLIC PNL TOTAL CA: CPT | Performed by: PHYSICIAN ASSISTANT

## 2024-02-16 PROCEDURE — 82746 ASSAY OF FOLIC ACID SERUM: CPT | Performed by: NURSE PRACTITIONER

## 2024-02-16 PROCEDURE — NC001 PR NO CHARGE: Performed by: PODIATRIST

## 2024-02-16 PROCEDURE — 82607 VITAMIN B-12: CPT | Performed by: NURSE PRACTITIONER

## 2024-02-16 PROCEDURE — 99231 SBSQ HOSP IP/OBS SF/LOW 25: CPT | Performed by: INTERNAL MEDICINE

## 2024-02-16 RX ORDER — LIDOCAINE HYDROCHLORIDE 10 MG/ML
10 INJECTION, SOLUTION EPIDURAL; INFILTRATION; INTRACAUDAL; PERINEURAL ONCE
Status: COMPLETED | OUTPATIENT
Start: 2024-02-16 | End: 2024-02-16

## 2024-02-16 RX ORDER — CYANOCOBALAMIN 1000 UG/ML
1000 INJECTION, SOLUTION INTRAMUSCULAR; SUBCUTANEOUS
Status: DISCONTINUED | OUTPATIENT
Start: 2024-02-17 | End: 2024-02-18

## 2024-02-16 RX ORDER — VANCOMYCIN HYDROCHLORIDE 1 G/200ML
1000 INJECTION, SOLUTION INTRAVENOUS EVERY 12 HOURS
Status: DISCONTINUED | OUTPATIENT
Start: 2024-02-17 | End: 2024-02-16

## 2024-02-16 RX ADMIN — SODIUM CHLORIDE 75 ML/HR: 0.9 INJECTION, SOLUTION INTRAVENOUS at 04:19

## 2024-02-16 RX ADMIN — PANTOPRAZOLE SODIUM 40 MG: 40 TABLET, DELAYED RELEASE ORAL at 07:59

## 2024-02-16 RX ADMIN — BUPROPION HYDROCHLORIDE 300 MG: 150 TABLET, EXTENDED RELEASE ORAL at 08:01

## 2024-02-16 RX ADMIN — CEFEPIME 1000 MG: 1 INJECTION, POWDER, FOR SOLUTION INTRAMUSCULAR; INTRAVENOUS at 05:33

## 2024-02-16 RX ADMIN — OXYCODONE HYDROCHLORIDE 5 MG: 5 TABLET ORAL at 01:39

## 2024-02-16 RX ADMIN — OMEGA-3 FATTY ACIDS CAP 1000 MG 1000 MG: 1000 CAP at 08:01

## 2024-02-16 RX ADMIN — CLONAZEPAM 1 MG: 1 TABLET ORAL at 18:33

## 2024-02-16 RX ADMIN — LIDOCAINE HYDROCHLORIDE 10 ML: 10 INJECTION, SOLUTION EPIDURAL; INFILTRATION; INTRACAUDAL; PERINEURAL at 10:37

## 2024-02-16 RX ADMIN — OXYCODONE HYDROCHLORIDE 5 MG: 5 TABLET ORAL at 07:57

## 2024-02-16 RX ADMIN — SODIUM CHLORIDE 75 ML/HR: 0.9 INJECTION, SOLUTION INTRAVENOUS at 18:39

## 2024-02-16 RX ADMIN — METHOCARBAMOL 250 MG: 500 TABLET ORAL at 15:00

## 2024-02-16 RX ADMIN — FLUTICASONE FUROATE AND VILANTEROL TRIFENATATE 1 PUFF: 200; 25 POWDER RESPIRATORY (INHALATION) at 07:58

## 2024-02-16 RX ADMIN — METHOCARBAMOL 250 MG: 500 TABLET ORAL at 05:32

## 2024-02-16 RX ADMIN — HEPARIN SODIUM 5000 UNITS: 5000 INJECTION INTRAVENOUS; SUBCUTANEOUS at 15:00

## 2024-02-16 RX ADMIN — PREGABALIN 200 MG: 100 CAPSULE ORAL at 08:01

## 2024-02-16 RX ADMIN — DOCUSATE SODIUM 100 MG: 100 CAPSULE, LIQUID FILLED ORAL at 18:33

## 2024-02-16 RX ADMIN — CEFEPIME 1000 MG: 1 INJECTION, POWDER, FOR SOLUTION INTRAMUSCULAR; INTRAVENOUS at 18:39

## 2024-02-16 RX ADMIN — ACETAMINOPHEN 975 MG: 325 TABLET, FILM COATED ORAL at 05:32

## 2024-02-16 RX ADMIN — PREGABALIN 200 MG: 100 CAPSULE ORAL at 21:40

## 2024-02-16 RX ADMIN — ACETAMINOPHEN 975 MG: 325 TABLET, FILM COATED ORAL at 21:39

## 2024-02-16 RX ADMIN — PREGABALIN 200 MG: 100 CAPSULE ORAL at 15:29

## 2024-02-16 RX ADMIN — INSULIN LISPRO 1 UNITS: 100 INJECTION, SOLUTION INTRAVENOUS; SUBCUTANEOUS at 07:45

## 2024-02-16 RX ADMIN — ASPIRIN 81 MG: 81 TABLET, COATED ORAL at 08:01

## 2024-02-16 RX ADMIN — INSULIN LISPRO 1 UNITS: 100 INJECTION, SOLUTION INTRAVENOUS; SUBCUTANEOUS at 12:51

## 2024-02-16 RX ADMIN — DOCUSATE SODIUM 100 MG: 100 CAPSULE, LIQUID FILLED ORAL at 08:01

## 2024-02-16 RX ADMIN — RIVAROXABAN 2.5 MG: 2.5 TABLET, FILM COATED ORAL at 18:34

## 2024-02-16 RX ADMIN — VANCOMYCIN HYDROCHLORIDE 1500 MG: 10 INJECTION, POWDER, LYOPHILIZED, FOR SOLUTION INTRAVENOUS at 10:00

## 2024-02-16 RX ADMIN — NICOTINE 1 PATCH: 14 PATCH, EXTENDED RELEASE TRANSDERMAL at 08:32

## 2024-02-16 RX ADMIN — POLYETHYLENE GLYCOL 3350 17 G: 17 POWDER, FOR SOLUTION ORAL at 10:37

## 2024-02-16 RX ADMIN — RIVAROXABAN 2.5 MG: 2.5 TABLET, FILM COATED ORAL at 08:00

## 2024-02-16 RX ADMIN — METHOCARBAMOL 250 MG: 500 TABLET ORAL at 21:40

## 2024-02-16 RX ADMIN — OXYCODONE HYDROCHLORIDE 5 MG: 5 TABLET ORAL at 15:28

## 2024-02-16 RX ADMIN — ACETAMINOPHEN 975 MG: 325 TABLET, FILM COATED ORAL at 15:00

## 2024-02-16 RX ADMIN — HEPARIN SODIUM 5000 UNITS: 5000 INJECTION INTRAVENOUS; SUBCUTANEOUS at 21:40

## 2024-02-16 RX ADMIN — OXYBUTYNIN CHLORIDE 5 MG: 5 TABLET, EXTENDED RELEASE ORAL at 08:01

## 2024-02-16 RX ADMIN — ATORVASTATIN CALCIUM 10 MG: 10 TABLET, FILM COATED ORAL at 15:30

## 2024-02-16 RX ADMIN — HEPARIN SODIUM 5000 UNITS: 5000 INJECTION INTRAVENOUS; SUBCUTANEOUS at 05:32

## 2024-02-16 NOTE — PROGRESS NOTES
[facility]  Progress Note  Name: Lona Cedeno I  MRN: [id number]  Unit/Bed#: [facility] 815-01 I Date of Admission: 2/13/2024   Date of Service: 2/16/2024 I Hospital Day: 3    Assessment/Plan   * Surgical site infection  Assessment & Plan  Continue cefepime and vancomycin  MRSA screen negative  Vascular surgery following status post washout with VAC placement on 2/14/2024  Infectious disease following  Follow or cultures    Sepsis without acute organ dysfunction (HCC)  Assessment & Plan  Fever/leukocytosis at [facility] prior to arrival here, additionally with lactic acidosis resolved following IV fluids  Concern for surgical site infection at groin, s/p waschout with vac placement on 2/14 with vascular surgery   Received cefepime/vancomycin at [facility] prior to transfer to medical floor, continue for now  ID consult appreciated  Blood cultures negative x48 hours   Covid/flu/rsv negative   Wound culture prelim 2/14-Klebsiella and Proteus growing  Anaerobic culture 2/14/2024-pending  Urine culture-prelim> 100,000 enterobacter cloacae      Other dietary vitamin B12 deficiency anemia  Assessment & Plan  B12 level 93  Send MMA   Start b12 injections     Diabetic foot ulcer with osteomyelitis ([facility])  Assessment & Plan  Podiatry assistance appreciated  Wound vac MWF       Tobacco use disorder  Assessment & Plan  Continue with nicotine patch  Encourage cessation    COPD, severity to be determined ([facility])  Assessment & Plan  Respiratory status is stable  Continue maintenance inhalers and PRN bronchodilators    PAD (peripheral artery disease) ([facility])  Assessment & Plan  1/31 BYPASS AXILLO-FEMORAL, RIGHT WITH 8MM RINGED PTFE GRAFT   Continue aspirin, statin    Essential hypertension  Assessment & Plan  Hypotension noted post op- now resolved   C/w holding amiloride, can restart later is indicated    Type 2 diabetes mellitus with diabetic polyneuropathy ([facility])  Assessment & Plan  Lab Results   Component Value  Date    HGBA1C 6.5 (H) 2023       Recent Labs     02/15/24  1606 02/15/24  2103 24  0720 24  1117   POCGLU 110 254* 168* 196*         Blood Sugar Average: Last 72 hrs:  (P) [id number]  hold metformin while inpatient  Diabetic diet  Fingersticks QID with sliding scale coverage             VTE Pharmacologic Prophylaxis: VTE Score: 3 Moderate Risk (Score 3-4) - Pharmacological DVT Prophylaxis Ordered: heparin.    Mobility:   Basic Mobility Inpatient Raw Score: 14  -HLM Goal: 4: Move to chair/commode  JH-HLM Achieved: 3: Sit at edge of bed  HLM Goal NOT achieved. Continue with multidisciplinary rounding and encourage appropriate mobility to improve upon HLM goals.    Patient Centered Rounds: I performed bedside rounds with nursing staff today.   Discussions with Specialists or Other Care Team Provider: d/w RN and CM     Education and Discussions with Family / Patient: Patient declined call to .     Total Time Spent on Date of Encounter in care of patient: 35 mins. This time was spent on one or more of the following: performing physical exam; counseling and coordination of care; obtaining or reviewing history; documenting in the medical record; reviewing/ordering tests, medications or procedures; communicating with other healthcare professionals and discussing with patient's family/caregivers.    Current Length of Stay: 3 day(s)  Current Patient Status: Inpatient   Certification Statement: The patient will continue to require additional inpatient hospital stay due to iv abxs   Discharge Plan: Anticipate discharge in 48-72 hrs to rehab facility.    Code Status: Level 3 - DNAR and DNI    Subjective:   Patient lying in bed.  Reports she still has not had a bowel movement.  Denies any other complaints.  That she was visited by her day and it made her feel very happy.    Objective:     Vitals:   Temp (24hrs), Av °F (36.7 °C), Min:97.7 °F (36.5 °C), Max:98.5 °F (36.9 °C)    Temp:   [97.7 °F (36.5 °C)-98.5 °F (36.9 °C)] 98.5 °F (36.9 °C)  HR:  [66-75] 75  Resp:  [15-18] 15  BP: (101-107)/(52-61) 101/61  SpO2:  [92 %-98 %] 93 %  Body mass index is 28.7 kg/m².     Input and Output Summary (last 24 hours):     Intake/Output Summary (Last 24 hours) at 2/16/2024 1608  Last data filed at 2/16/2024 1200  Gross per 24 hour   Intake 1940 ml   Output 2650 ml   Net -710 ml       Physical Exam:   Physical Exam  Vitals and nursing note reviewed.   Constitutional:       General: She is not in acute distress.     Appearance: She is well-developed. She is not ill-appearing.   Cardiovascular:      Rate and Rhythm: Normal rate and regular rhythm.      Heart sounds: Normal heart sounds. No murmur heard.  Pulmonary:      Effort: Pulmonary effort is normal. No respiratory distress.      Breath sounds: Normal breath sounds. No wheezing or rales.   Abdominal:      General: Bowel sounds are normal. There is no distension.      Palpations: Abdomen is soft.      Tenderness: There is no abdominal tenderness.   Musculoskeletal:         General: No swelling.   Skin:     General: Skin is warm and dry.      Findings: Bruising present.      Comments: Vac x2 to right groin and right foot. Right chest incision with sutures noted and seroma appears stable without signs of infection or drainge    Neurological:      Mental Status: She is alert. Mental status is at baseline.   Psychiatric:         Mood and Affect: Mood normal.          Additional Data:     Labs:  Results from last 7 days   Lab Units 02/16/24  0509 02/15/24  0602   WBC Thousand/uL 10.95* 12.41*   HEMOGLOBIN g/dL 9.6* 9.8*   HEMATOCRIT % 31.4* 31.8*   PLATELETS Thousands/uL 354 347   NEUTROS PCT %  --  87*   LYMPHS PCT %  --  9*   MONOS PCT %  --  3*   EOS PCT %  --  0     Results from last 7 days   Lab Units 02/16/24  0509   SODIUM mmol/L 137   POTASSIUM mmol/L 4.1   CHLORIDE mmol/L 105   CO2 mmol/L 26   BUN mg/dL 12   CREATININE mg/dL 0.56*   ANION GAP mmol/L 6    CALCIUM mg/dL 8.7   GLUCOSE RANDOM mg/dL 157*     Results from last 7 days   Lab Units 02/14/24  0446   INR  1.16     Results from last 7 days   Lab Units 02/16/24  1117 02/16/24  0720 02/15/24  2103 02/15/24  1606 02/15/24  1124 02/15/24  0734 02/14/24  2055 02/14/24  1602 02/14/24  1149 02/14/24  0532 02/14/24  0015 02/13/24  2036   POC GLUCOSE mg/dl 196* 168* 254* 110 167* 159* 186* 120 158* 100 179* 106         Results from last 7 days   Lab Units 02/15/24  0602 02/14/24  0446 02/13/24  2129 02/13/24  1750 02/13/24  1427   LACTIC ACID mmol/L  --   --  1.7 2.3*  --    PROCALCITONIN ng/ml 0.39* 0.52*  --   --  0.06       Lines/Drains:  Invasive Devices       Peripheral Intravenous Line  Duration             Peripheral IV 02/14/24 Distal;Right;Ventral (anterior) Forearm 1 day              Drain  Duration             Ureteral Internal Stent Left ureter 6 Fr. 88 days                          Imaging: No pertinent imaging reviewed.    Recent Cultures (last 7 days):   Results from last 7 days   Lab Units 02/14/24  1539 02/14/24  1534 02/13/24  1920 02/13/24  1750   BLOOD CULTURE   --   --   --  No Growth at 48 hrs.  No Growth at 48 hrs.   GRAM STAIN RESULT  1+ Disintegrating polys*  1+ Gram positive rods* No Polys or Bacteria seen  --   --    URINE CULTURE   --   --  >100,000 cfu/ml Enterobacter cloacae*  --    WOUND CULTURE  1+ Growth of Klebsiella pneumoniae*  1+ Growth of Proteus vulgaris group*  Few Colonies of  --   --   --        Last 24 Hours Medication List:   Current Facility-Administered Medications   Medication Dose Route Frequency Provider Last Rate    acetaminophen  975 mg Oral Q8H [facility] Himanshu Overton MD      albuterol  2 puff Inhalation Q6H PRN Himanshu Overton MD      aspirin  81 mg Oral Daily Himanshu Overton MD      atorvastatin  10 mg Oral Daily With Dinner Himanshu Overton MD      bisacodyl  10 mg Rectal Daily PRN ROXANE Munoz      buPROPion  300 mg Oral Daily Himanshu Overton MD       cefepime  1,000 mg Intravenous Q12H Himanshu Overton MD 1,000 mg (02/16/24 0533)    clonazePAM  1 mg Oral QPM Himanshu Overton MD      [START ON 2/17/2024] cyanocobalamin  1,000 mcg Intramuscular Q30 Days ROXANE Munoz      docusate sodium  100 mg Oral BID Himanshu Overton MD      fish oil  1,000 mg Oral Daily Himanshu Overton MD      fluticasone-vilanterol  1 puff Inhalation Daily Himanshu Overton MD      heparin (porcine)  5,000 Units Subcutaneous Q8H [facility] Himanshu Overton MD      insulin lispro  1-5 Units Subcutaneous TID With Meals Amrita Paul MD      methocarbamol  250 mg Oral Q8H [facility] Himanshu Overton MD      nicotine  1 patch Transdermal Daily Himanshu Overton MD      ondansetron  4 mg Intravenous Q6H PRN Himanshu Overton MD      oxybutynin  5 mg Oral Daily Himanshu Overton MD      oxyCODONE  2.5 mg Oral Q4H PRN Himanshu Overton MD      Or    oxyCODONE  5 mg Oral Q4H PRN Himanshu Overton MD      pantoprazole  40 mg Oral Early Morning Himanshu Overton MD      polyethylene glycol  17 g Oral Daily ROXANE Munoz      pregabalin  200 mg Oral TID Himanshu Overton MD      rivaroxaban  2.5 mg Oral BID With Meals Felipa Foy PA-C      senna  2 tablet Oral Daily PRN Enzo Elam DO      sodium chloride  75 mL/hr Intravenous Continuous Himanshu Overton MD 75 mL/hr (02/16/24 0419)    [START ON 2/17/2024] vancomycin  1,000 mg Intravenous Q12H Enzo Elam DO          Today, Patient Was Seen By: ROXANE Munoz    **Please Note: This note may have been constructed using a voice recognition system.**

## 2024-02-16 NOTE — ASSESSMENT & PLAN NOTE
Lab Results   Component Value Date    HGBA1C 6.5 (H) 11/07/2023       Recent Labs     02/15/24  1606 02/15/24  2103 02/16/24  0720 02/16/24  1117   POCGLU 110 254* 168* 196*       Blood Sugar Average: Last 72 hrs:  (P) [id number]  hold metformin while inpatient  Diabetic diet  Fingersticks QID with sliding scale coverage

## 2024-02-16 NOTE — ASSESSMENT & PLAN NOTE
Fever/leukocytosis at [facility] prior to arrival here, additionally with lactic acidosis resolved following IV fluids  Concern for surgical site infection at groin, s/p waschout with vac placement on 2/14 with vascular surgery   Received cefepime/vancomycin at [facility] prior to transfer to medical floor, continue for now  ID consult appreciated  Blood cultures negative x48 hours   Covid/flu/rsv negative   Wound culture prelim 2/14-Klebsiella and Proteus growing  Anaerobic culture 2/14/2024-pending  Urine culture-prelim> 100,000 enterobacter cloacae

## 2024-02-16 NOTE — ASSESSMENT & PLAN NOTE
1/31 BYPASS AXILLO-FEMORAL, RIGHT WITH 8MM RINGED PTFE GRAFT   Continue aspirin, statin, xarelto 2.5 mg bid

## 2024-02-16 NOTE — ASSESSMENT & PLAN NOTE
Continue cefepime, vancomycin DC'd 2/16/2024  MRSA screen negative  Vascular surgery following status post washout with VAC placement on 2/14/2024  Per vascular note possible OR Monday for re-exploration of right groin, washout, vac change to ensure no graft exposure    Infectious disease following  Follow or cultures

## 2024-02-16 NOTE — CASE MANAGEMENT
Case Management Discharge Planning Note    Patient name Lona Cedeno  Location [facility] 815/[facility] 815-01 MRN [id number]  : 1945 Date 2024       Current Admission Date: 2024  Current Admission Diagnosis:Surgical site infection   Patient Active Problem List    Diagnosis Date Noted    Leukocytosis 2024    Surgical site infection 2024    Sepsis without acute organ dysfunction (HCC) 2024    At risk for venous thromboembolism (VTE) 2024    At high risk for skin breakdown 2024    Wound of skin 2024    Impaired mobility and activities of daily living 2024    Acute pain due to injury 2024    Hematoma 2024    Constipation 2024    Preoperative cardiovascular examination 2024    Diabetic foot ulcer with osteomyelitis ([facility]) 2024    Ankle ulcer, right, with fat layer exposed ([facility]) 2023    Age-related osteoporosis without current pathological fracture 2023    Abnormal CT of the chest 10/17/2023    COPD, severity to be determined ([facility]) 10/17/2023    Tobacco use disorder 10/17/2023    PAD (peripheral artery disease) ([facility]) 10/10/2023    Bladder neoplasm 2023    Left renal mass 2023    Lactic acidosis 2023    Hypomagnesemia 2023    Degenerative lumbar disc 2023    Urinary incontinence 2023    GERD without esophagitis 10/16/2019    Medicare annual wellness visit, subsequent 2019    Essential hypertension 02/15/2019    Anxiety and depression 02/15/2019    Type 2 diabetes mellitus with diabetic polyneuropathy (HCC) 2019      LOS (days): 3  Geometric Mean LOS (GMLOS) (days): 5.1  Days to GMLOS:2.6     OBJECTIVE:  Risk of Unplanned Readmission Score: 19.1         Current admission status: Inpatient   Preferred Pharmacy:   [id number] - [place] - [address] [place] [id number]  Phone: [phone] Fax: [phone]    [facility]  [place] - [address]  [address]  [place] [address]  Phone: [phone] Fax: [phone]    Primary Care Provider: Vivek Preston DO    Primary Insurance: [employer]AT[employer]Z KWAN  Secondary Insurance:     DISCHARGE DETAILS:                                                  Additional Comments: Patient is expected to be medically stable for discharge soon, with return to [facility].  Will need updated PT/OT and insurance authorization, bed not available until early next week.

## 2024-02-16 NOTE — PROGRESS NOTES
Lona Cedeno is a 78 y.o. female who is currently ordered Vancomycin IV with management by the Pharmacy Consult service.  Relevant clinical data and objective / subjective history reviewed.  Vancomycin Assessment:  Indication and Goal AUC/Trough: Soft tissue (goal -600, trough >10), -600, trough >10  Clinical Status: stable  Micro:     Renal Function:  SCr: 0.56 mg/dL  CrCl: 74.6 mL/min  Renal replacement: Not on dialysis  Days of Therapy: 4  Current Dose: 1500 mg IV q 12 hr  Vancomycin Plan: due to estimated AUC > 600, dose will be reduced starting at 0000 on   New Dosin mg IV q 12 hr  Estimated AUC: 411 mcg hr/mL  Estimated Trough: 12.3 mcg/mL  Next Level: 24 am labs  Renal Function Monitoring: Daily BMP and UOP  Pharmacy will continue to follow closely for s/sx of nephrotoxicity, infusion reactions and appropriateness of therapy.  BMP and CBC will be ordered per protocol. We will continue to follow the patient’s culture results and clinical progress daily.    Vivek Stahl, R.Ph., Pharmacist

## 2024-02-16 NOTE — PROGRESS NOTES
Progress Note - Infectious Disease   Lona Cedeno 78 y.o. female MRN: [id number]  Unit/Bed#: [facility] 815-01 Encounter: [id number]      Impression:  1.  Postoperative right groin wound infection with secondary sepsis and with presumptive involvement of recent axillofemoral graft s/p debridement, washout and VAC placement POD 2  2.  Type II DM with PAD s/p remote right ankle fracture with ORIF  and right axillary to femoral bypass 2024 with Achilles debridement and partial second toe amputation with hematoma evacuation  3.  COPD  4.  GERD    Recommendations:  Patient is afebrile with reduced WBC count of 10,950 and hemoglobin of 9.6.  Wound picture by vascular surgery noted  1.  So far wound cultures are showing 1+ Klebsiella pneumoniae a and Proteus vulgaris group with susceptibilities pending.  MRSA nares culture is negative  2.  Pending above we will discontinue vancomycin and continue cefepime 1 g every 12 hours IV    Antibiotics:  1.  Cefepime 1 g every 12 hours IV, day 4 Rx    Subjective:  The patient has no complaints.  Denies fevers, chills, or sweats.  Denies nausea, vomiting, or diarrhea.      Objective:  Vitals:  Temp:  [97.7 °F (36.5 °C)-98.5 °F (36.9 °C)] 98.5 °F (36.9 °C)  HR:  [66-75] 75  Resp:  [15-18] 15  BP: (101-107)/(52-61) 101/61  SpO2:  [92 %-98 %] 93 %  Temp (24hrs), Av °F (36.7 °C), Min:97.7 °F (36.5 °C), Max:98.5 °F (36.9 °C)  Current: Temperature: 98.5 °F (36.9 °C)    Physical Exam:     General Appearance:  Eyes: Alert, responsive nontoxic, no acute distress.  Conjunctiva pale   Throat: Oropharynx moist without lesions.  Lips, mucosa, and tongue normal   Neck: Supple, symmetrical, trachea midline, no adenopathy,  no tenderness/mass/nodules   Lungs:   Clear to auscultation bilaterally, no audible wheezes, rhonchi or rales; respirations unlabored   Heart:  Regular rate and rhythm, S1, S2 normal, no murmur, rub or gallop   Abdomen:   Soft, non-tender, non-distended, positive bowel  sounds.  No masses, no organomegaly    No CVA tenderness   Extremities: Right groin wound with VAC in place.  Vascular surgery picture shows wound to have granulation tissue without overt purulence.  Right foot and ankle with dry surgical dressing s/p distal second toe amputation   Skin: Skin color pale, as above.         Invasive Devices       Peripheral Intravenous Line  Duration             Peripheral IV 02/14/24 Distal;Right;Ventral (anterior) Forearm 1 day              Drain  Duration             Ureteral Internal Stent Left ureter 6 Fr. 88 days                    Labs, Imaging, & Other studies:   All pertinent labs were personally reviewed  Results from last 7 days   Lab Units 02/16/24  0509 02/15/24  0602 02/14/24  0446   WBC Thousand/uL 10.95* 12.41* 18.55*   HEMOGLOBIN g/dL 9.6* 9.8* 11.0*   PLATELETS Thousands/uL 354 347 391*     Results from last 7 days   Lab Units 02/16/24  0509 02/15/24  0602 02/14/24  0446   SODIUM mmol/L 137 136 138   POTASSIUM mmol/L 4.1 4.3 3.8   CHLORIDE mmol/L 105 105 99   CO2 mmol/L 26 27 31   BUN mg/dL 12 15 21   CREATININE mg/dL 0.56* 0.41* 0.67   EGFR ml/min/1.73sq m 90 99 85   CALCIUM mg/dL 8.7 9.2 10.0     Results from last 7 days   Lab Units 02/14/24  1539 02/14/24  1534 02/14/24  1153 02/13/24  1920 02/13/24  1750   BLOOD CULTURE   --   --   --   --  No Growth at 48 hrs.  No Growth at 48 hrs.   GRAM STAIN RESULT  1+ Disintegrating polys*  1+ Gram positive rods* No Polys or Bacteria seen  --   --   --    URINE CULTURE   --   --   --  >100,000 cfu/ml Enterobacter cloacae*  --    WOUND CULTURE  1+ Growth of Klebsiella pneumoniae*  1+ Growth of Proteus vulgaris group*  Few Colonies of  --   --   --   --    MRSA CULTURE ONLY   --   --  No Methicillin Resistant Staphlyococcus aureus (MRSA) isolated  --   --

## 2024-02-16 NOTE — PROGRESS NOTES
Progress Note - Vascular Surgery   Lona Cedeno 78 y.o. female [id number]  Unit/Bed#:[facility] 815-01 Encounter: [id number]      Assessment:    78 y.o. with PMH HTN, HLD, COPD, DM, Right Ankle ORIF presenting with AIOD, Right CLTI c exposed, necrotic heel wound.     Vascular surgery consulted for AIOD, Right CLTI c exposed necrotic heel wound.     1/31: Right Ax to Fem PTFE bypass  2/14 Rt Groin washout and vac  2/4 Rt Axillary I&D, Pods Achilles debridement, partial 2nd to amp, Hematoma evac.     Plan:  - Doing well post-op; viable, well perfused limb  - Cont VAC to Right groin; Change MWF  - OOB as tolerated  - Diet as tolerated  - Abx Vanco, Cefepime  - 2/14 Bcx: NGTD  - 2/14 WCX: Gram Pos, Polys   - 2/13 Ucx: Enterobacter  - No evidence of arterial prosthetic graft infection on wound exploration 2/14.   - Right chest wall incision c/d/I, seroma improving   - VTEppx: SQH  - Cont Aspirin, Statin  - Can resume Xarelto 2.5mg BID  - Further recommendations pending clinical course     Subjective:    Pt s/e. No acute events overnight. Pt awake, alert.    Tolerating diet. Not OOB. Voiding.      Pain controlled. Admits to pain at surgical site.     No new complaints. Denies n/v, sob, chest pain, f/c. No sensory change, numbness, or weakness of lower extremities    I/Os:  I/O last 3 completed shifts:  In: 3846.3 [P.O.:780; I.V.:3066.3]  Out: 4450 [Urine:4450]  No intake/output data recorded.    Review of Systems   All other systems reviewed and are negative.      Vitals:    02/16/24 0721   BP: 105/54   Pulse: 66   Resp: 16   Temp: 97.7 °F (36.5 °C)   SpO2: 98%        Physical Exam  Vitals and nursing note reviewed.   Constitutional:       General: She is not in acute distress.     Appearance: She is well-developed. She is not diaphoretic.   HENT:      Head: Normocephalic and atraumatic.   Eyes:      Conjunctiva/sclera: Conjunctivae normal.   Cardiovascular:      Rate and Rhythm: Normal rate and regular rhythm.      Heart  sounds: No murmur heard.     Comments: RUE Palp brachial, radial pulse     LLE non-palp fem  RLE palp  Pop  Right ax-fem dop signal  RLE Dop DP  Pulmonary:      Effort: Pulmonary effort is normal. No respiratory distress.      Breath sounds: Normal breath sounds.   Abdominal:      Palpations: Abdomen is soft.      Tenderness: There is no abdominal tenderness.   Musculoskeletal:         General: Signs of injury present. No swelling.      Cervical back: Neck supple.      Comments: Right heel VAC  Right groin VAC  Right chest wall incision soft, c/d/I     Skin:     General: Skin is warm and dry.      Capillary Refill: Capillary refill takes less than 2 seconds.   Neurological:      General: No focal deficit present.      Mental Status: She is alert and oriented to person, place, and time. Mental status is at baseline.   Psychiatric:         Mood and Affect: Mood normal.         Imaging:I have personally reviewed pertinent reports.      Lab Results and Cultures:   Lab Results   Component Value Date    WBC 10.95 (H) 02/16/2024    HGB 9.6 (L) 02/16/2024    HCT 31.4 (L) 02/16/2024     (H) 02/16/2024     02/16/2024     Lab Results   Component Value Date    GLUCOSE 143 (H) 01/31/2024    CALCIUM 8.7 02/16/2024     05/21/2018    K 4.1 02/16/2024    CO2 26 02/16/2024     02/16/2024    BUN 12 02/16/2024    CREATININE 0.56 (L) 02/16/2024     Lab Results   Component Value Date    INR 1.16 02/14/2024    INR 1.03 02/04/2024    INR 1.11 02/03/2024    PROTIME 14.7 (H) 02/14/2024    PROTIME 13.4 02/04/2024    PROTIME 14.2 02/03/2024        Blood Culture:   Lab Results   Component Value Date    BLOODCX No Growth at 48 hrs. 02/13/2024    BLOODCX No Growth at 48 hrs. 02/13/2024   ,   Urinalysis:   Lab Results   Component Value Date    COLORU Light Yellow 02/14/2024    COLORU YELLOW 05/21/2018    CLARITYU Clear 02/14/2024    CLARITYU CLEAR 05/21/2018    SPECGRAV 1.009 02/14/2024    SPECGRAV 1.015 05/21/2018     PHUR 5.5 02/14/2024    PHUR 6.0 05/21/2018    LEUKOCYTESUR Large (A) 02/14/2024    LEUKOCYTESUR NEGATIVE 05/21/2018    NITRITE Negative 02/14/2024    NITRITE NEGATIVE 05/21/2018    PROTEINUA NEGATIVE 05/21/2018    GLUCOSEU Negative 02/14/2024    GLUCOSEU NEGATIVE 05/21/2018    KETONESU Negative 02/14/2024    KETONESU NEGATIVE 05/21/2018    BILIRUBINUR Negative 02/14/2024    BILIRUBINUR NEGATIVE 05/21/2018    BLOODU Negative 02/14/2024    BLOODU NEGATIVE 05/21/2018   ,   Urine Culture:   Lab Results   Component Value Date    URINECX >100,000 cfu/ml Enterobacter cloacae (A) 02/13/2024   ,   Wound Culure:   Lab Results   Component Value Date    WOUNDCULT Culture results to follow. 02/14/2024       Medications:  Current Facility-Administered Medications   Medication Dose Route Frequency    acetaminophen (TYLENOL) tablet 975 mg  975 mg Oral Q8H JILLIAN    albuterol (PROVENTIL HFA,VENTOLIN HFA) inhaler 2 puff  2 puff Inhalation Q6H PRN    aspirin (ECOTRIN LOW STRENGTH) EC tablet 81 mg  81 mg Oral Daily    atorvastatin (LIPITOR) tablet 10 mg  10 mg Oral Daily With Dinner    bisacodyl (DULCOLAX) rectal suppository 10 mg  10 mg Rectal Daily PRN    buPROPion (WELLBUTRIN XL) 24 hr tablet 300 mg  300 mg Oral Daily    cefepime (MAXIPIME) 1,000 mg in dextrose 5 % 50 mL IVPB  1,000 mg Intravenous Q12H    clonazePAM (KlonoPIN) tablet 1 mg  1 mg Oral QPM    docusate sodium (COLACE) capsule 100 mg  100 mg Oral BID    fish oil capsule 1,000 mg  1,000 mg Oral Daily    fluticasone-vilanterol 200-25 mcg/actuation 1 puff  1 puff Inhalation Daily    heparin (porcine) subcutaneous injection 5,000 Units  5,000 Units Subcutaneous Q8H [facility]    insulin lispro (HumaLOG) 100 units/mL subcutaneous injection 1-5 Units  1-5 Units Subcutaneous TID With Meals    lidocaine (PF) (XYLOCAINE-MPF) 1 % injection 10 mL  10 mL Infiltration Once    methocarbamol (ROBAXIN) tablet 250 mg  250 mg Oral Q8H JILLIAN    nicotine (NICODERM CQ) 14 mg/24hr TD 24 hr patch 1 patch   1 patch Transdermal Daily    ondansetron (ZOFRAN) injection 4 mg  4 mg Intravenous Q6H PRN    oxybutynin (DITROPAN-XL) 24 hr tablet 5 mg  5 mg Oral Daily    oxyCODONE (ROXICODONE) split tablet 2.5 mg  2.5 mg Oral Q4H PRN    Or    oxyCODONE (ROXICODONE) IR tablet 5 mg  5 mg Oral Q4H PRN    pantoprazole (PROTONIX) EC tablet 40 mg  40 mg Oral Early Morning    polyethylene glycol (MIRALAX) packet 17 g  17 g Oral Daily    pregabalin (LYRICA) capsule 200 mg  200 mg Oral TID    rivaroxaban (XARELTO) tablet 2.5 mg  2.5 mg Oral BID With Meals    senna (SENOKOT) tablet 17.2 mg  2 tablet Oral Daily PRN    sodium chloride 0.9 % infusion  75 mL/hr Intravenous Continuous    vancomycin (VANCOCIN) 1500 mg in sodium chloride 0.9% 250 mL IVPB  1,500 mg Intravenous Q12H       Patient Active Problem List   Diagnosis    Type 2 diabetes mellitus with diabetic polyneuropathy (HCC)    Essential hypertension    Anxiety and depression    Medicare annual wellness visit, subsequent    GERD without esophagitis    Urinary incontinence    Degenerative lumbar disc    Lactic acidosis    Hypomagnesemia    Bladder neoplasm    Left renal mass    PAD (peripheral artery disease) (HCC)    Abnormal CT of the chest    COPD, severity to be determined (HCC)    Tobacco use disorder    Age-related osteoporosis without current pathological fracture    Ankle ulcer, right, with fat layer exposed (HCC)    Diabetic foot ulcer with osteomyelitis (HCC)    Preoperative cardiovascular examination    Constipation    Hematoma    At risk for venous thromboembolism (VTE)    At high risk for skin breakdown    Wound of skin    Impaired mobility and activities of daily living    Acute pain due to injury    Leukocytosis    Surgical site infection    Sepsis without acute organ dysfunction (HCC)       Past Surgical History:   Procedure Laterality Date    ANKLE FRACTURE SURGERY Right     has screw in place     SECTION      x2    CHOLECYSTECTOMY      CYSTOSCOPY W/ LASER  LITHOTRIPSY Left 9/18/2023    Procedure: CYSTOSCOPY; RETROGRADE; URETEROSCOPY; BIOPSY; LEFT -INSERTION OF STENT;  Surgeon: Cristian Child MD;  Location: [facility];  Service: Urology    CYSTOSCOPY W/ LASER LITHOTRIPSY Left 11/20/2023    Procedure: CYSTOSCOPY; RETROGRADE; URETEROSCOPY; 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;  Surgeon: Cristian Child MD;  Location: [facility];  Service: Urology    EVACUATION OF HEMATOMA Right 2/4/2024    Procedure: EVACUATION/ DRAINAGE CHEST WALL  HEMATOMA;  Surgeon: Seth Hoyos MD;  [facility];  Service: Vascular    FL RETROGRADE PYELOGRAM  9/18/2023    HERNIA REPAIR      umbilicus w/ mesh    IA BYPASS W/VEIN AXILLARY-FEMORAL Right 1/31/2024    Procedure: BYPASS AXILLO-FEMORAL, RIGHT WITH 8MM RINGED PTFE GRAFT;  Surgeon: Seth Hoyos MD;  [facility];  Service: Vascular    WOUND DEBRIDEMENT Right 2/4/2024    Procedure: RIGHT WOUND AND ACHILLES DEBRIDEMENT FOOT/TOE (WASH OUT); PARTIAL AMPUTATION DISTAL 2ND TOE;  Surgeon: Aaron Montero DPM;  [facility];  Service: Podiatry    WOUND DEBRIDEMENT Right 2/14/2024    Procedure: DEBRIDEMENT RIGHT GROIN  WOUND AND WASHOUT, APPLICATION OF WOUND VAC;  Surgeon: Suly Muro DO;  [facility];  Service: Vascular       Family History   Problem Relation Age of Onset    COPD Mother     Emphysema Mother     COPD Father     Emphysema Father        Social History     Socioeconomic History    Marital status: /Civil Union     Spouse name: Not on file    Number of children: Not on file    Years of education: Not on file    Highest education level: Not on file   Occupational History    Not on file   Tobacco Use    Smoking status: Every Day     Current packs/day: 1.00     Average packs/day: 1 pack/day for 63.1 years (63.1 ttl pk-yrs)     Types: Cigarettes     Start date: 1961    Smokeless tobacco: Never    Tobacco comments:     11/14/23 smokes about 3/4 PPD    Vaping Use    Vaping status: Never Used   Substance and Sexual Activity    Alcohol use: Not Currently    Drug use: Never    Sexual activity: Not Currently   Other Topics Concern    Not on file   Social History Narrative    Not on file     Social Determinants of Health     Financial Resource Strain: Low Risk  (2/9/2023)    Overall Financial Resource Strain (CARDIA)     Difficulty of Paying Living Expenses: Not hard at all   Food Insecurity: No Food Insecurity (1/24/2024)    Hunger Vital Sign     Worried About Running Out of Food in the Last Year: Never true     Ran Out of Food in the Last Year: Never true   Transportation Needs: No Transportation Needs (1/24/2024)    PRAPARE - Transportation     Lack of Transportation (Medical): No     Lack of Transportation (Non-Medical): No   Physical Activity: Not on file   Stress: Not on file   Social Connections: Not on file   Intimate Partner Violence: Not on file   Housing Stability: Low Risk  (1/24/2024)    Housing Stability Vital Sign     Unable to Pay for Housing in the Last Year: No     Number of Places Lived in the Last Year: 1     Unstable Housing in the Last Year: No       Allergies   Allergen Reactions    Paroxetine Other (See Comments)     Became suicidal    Adhesive [Medical Tape] Itching and Blisters    Carafate [Sucralfate] Other (See Comments)     Mouth sores, tongue sore    Sulfa Antibiotics Hives and Rash    Sulfamethoxazole-Trimethoprim Hives

## 2024-02-16 NOTE — PROGRESS NOTES
Vascular Surgery  Lona Cedeno 78 y.o. female MRN: [id number]  Unit/Bed#: [facility] 815-01 Encounter: [id number]    DOMINICK Procedure Note    Date: 2/16/2024    Location of wound: Right groin    Sponges removed:  2 Black Sponges  0 White Sponges    Dimensions of wound: 7.5 cm x 3 cm x 2 cm    Description of wound:   Clean surgical wound w/ fibrinous slough in superior portion, +early granulation, no drainage, no odor.      Sponges placed:  2 Black Sponges  0 White Sponges    VAC settings:  125 mmHg  Continuous    Pt pretreated with Lidocaine 1% 10cc to sponge prior to removal and oxycodone  Pt tolerated procedure well  VAC sticker placed to wound dressing, per protocol      Shaneka Ortega PA-C  2/16/2024

## 2024-02-16 NOTE — DISCHARGE INSTR - AVS FIRST PAGE
Discharge Instructions - Podiatry    Weight Bearing Status: Non-weight bearing to right lower extremity                    Pain: Continue analgesics as directed    Follow-up appointment instructions: Please make an appointment within one week of discharge with  [facility]. Contact sooner if any increase in pain, or signs of infection occur    Wound Care: Leave dressings clean, dry, and intact between professional dressing changes    Nursing Instructions: Please apply Wound Vac Dresing applied at 125mmHg. Then cover with Gauze and secure with Kerlix and tape. Please change dressing every Monday, Wednesday, and Friday .

## 2024-02-16 NOTE — PLAN OF CARE
Problem: Prexisting or High Potential for Compromised Skin Integrity  Goal: Skin integrity is maintained or improved  Description: INTERVENTIONS:  - Identify patients at risk for skin breakdown  - Assess and monitor skin integrity  - Assess and monitor nutrition and hydration status  - Monitor labs   - Assess for incontinence   - Turn and reposition patient  - Assist with mobility/ambulation  - Relieve pressure over bony prominences  - Avoid friction and shearing  - Provide appropriate hygiene as needed including keeping skin clean and dry  - Evaluate need for skin moisturizer/barrier cream  - Collaborate with interdisciplinary team   - Patient/family teaching  - Consider wound care consult   Outcome: Progressing     Problem: SKIN/TISSUE INTEGRITY - ADULT  Goal: Incision(s), wounds(s) or drain site(s) healing without S/S of infection  Description: INTERVENTIONS  - Assess and document dressing, incision, wound bed, drain sites and surrounding tissue  - Provide patient and family education  - Perform skin care/dressing changes every shift  Outcome: Progressing     Problem: INFECTION - ADULT  Goal: Absence or prevention of progression during hospitalization  Description: INTERVENTIONS:  - Assess and monitor for signs and symptoms of infection  - Monitor lab/diagnostic results  - Monitor all insertion sites, i.e. indwelling lines, tubes, and drains  - Monitor endotracheal if appropriate and nasal secretions for changes in amount and color  - [place] appropriate cooling/warming therapies per order  - Administer medications as ordered  - Instruct and encourage patient and family to use good hand hygiene technique  - Identify and instruct in appropriate isolation precautions for identified infection/condition  Outcome: Progressing

## 2024-02-16 NOTE — CONSULTS
Consultation - Infectious Disease   Lona Cedeno 78 y.o. female MRN: [id number]  Unit/Bed#: [facility] 815-01 Encounter: [id number]      Inpatient consult to Infectious Diseases  Consult performed by: Bib Stevens MD  Consult ordered by: Shaneka Ortega PA-C          IMPRESSION & RECOMMENDATIONS:   Impression:  1.  Postoperative right groin wound infection with secondary sepsis and with presumptive involvement of recent axillofemoral graft s/p debridement, washout and VAC placement POD 1  2.  Type II DM with PAD s/p remote right ankle fracture with ORIF 2008 and right axillary to femoral bypass 1/31/2024 with Achilles debridement and partial second toe amputation with hematoma evacuation  3.  COPD  4.  GERD    Recommendations:    Discuss with the primary service.  1.  Check final wound blood and urine cultures  2.  Pending above agree with continuing vancomycin 1.5 g every 12 hours IV review with further dosing by pharmacy, cefepime 1 g every 12 hours IV (both day 3 Rx)  3.  Although the recent axillary femoral graft was not grossly exposed it is certainly affected microscopically by the wound infection.  Treatment and duration would have to cover this probability        HISTORY OF PRESENT ILLNESS:    Reason for Consult: Right surgical site groin infection with axillofemoral graft in place  HPI: Lona Cedeno is a 78 y.o. year old female with a past history of DM type II and PAD, COPD, GERD, right ankle fracture s/p ORIF 2008 who now has a right surgical site groin infection s/p axillofemoral graft.  Her current history dates back to admission here from 1/26/2024-2/9/2024 from podiatry office with a nonhealing right toe and right ankle diabetic ulcers with exposed Achilles tendon.  An MRI confirmed osteomyelitis of the right foot.  On 1/31/2024 she underwent extra-anatomic bypass of the right axillary to femoral artery.  Although a right transtibial amputation was discussed there was a decision made to attempt  limb salvage.  On 2024 she had a right foot wound and Achilles debridement with a second toe partial amputation and VAC placement.  She was discharged to the [facility] here from 2024-2024.  She developed a groin surgical site infection with fever and leukocytosis and was started on cefepime and vancomycin IV and transferred to our inpatient unit on  for further evaluation and therapy.  Patient had encephalopathy and met sepsis criteria.  Yesterday she underwent right groin wound debridement with washout and application of wound VAC by vascular surgery.  Apparently the graft was not grossly exposed.  So far this SARS Cov 2 PCR panel negative and blood cultures are negative at 48 hours, urine culture showing greater than 100,000 Enterobacter cloacae with susceptibilities pending and wound cultures are all pending both aerobic and anaerobic.  She has defervesced and is currently on day 3 of IV vancomycin and cefepime.  CBC is currently showing a white count of 12,410 with 87% neutrophils, hemoglobin 9.8 and platelet count of 347,000, BMP has a creatinine of 0.41.      Review of Systems  A [id number] point system-based review of systems is otherwise negative.    PAST MEDICAL HISTORY:  Past Medical History:   Diagnosis Date    Anxiety     Closed fracture of coccyx (HCC) 2023    Diabetes mellitus (HCC)     GERD (gastroesophageal reflux disease)     Hyperlipidemia     Hypertension     IBS (irritable bowel syndrome)     Shoulder fracture      Past Surgical History:   Procedure Laterality Date    ANKLE FRACTURE SURGERY Right     has screw in place     SECTION      x2    CHOLECYSTECTOMY      CYSTOSCOPY W/ LASER LITHOTRIPSY Left 2023    Procedure: CYSTOSCOPY; RETROGRADE; URETEROSCOPY; BIOPSY; LEFT -INSERTION OF STENT;  Surgeon: Cristian Child MD;  [facility];  Service: Urology    CYSTOSCOPY W/ LASER LITHOTRIPSY Left 2023    Procedure: CYSTOSCOPY; RETROGRADE; URETEROSCOPY; LASER  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;  Surgeon: Cristian Child MD;  [facility];  Service: Urology    EVACUATION OF HEMATOMA Right 2/4/2024    Procedure: EVACUATION/ DRAINAGE CHEST WALL  HEMATOMA;  Surgeon: Seth Hoyos MD;  [facility];  Service: Vascular    FL RETROGRADE PYELOGRAM  9/18/2023    HERNIA REPAIR      umbilicus w/ mesh    IA BYPASS W/VEIN AXILLARY-FEMORAL Right 1/31/2024    Procedure: BYPASS AXILLO-FEMORAL, RIGHT WITH 8MM RINGED PTFE GRAFT;  Surgeon: Seth Hoyos MD;  [facility];  Service: Vascular    WOUND DEBRIDEMENT Right 2/4/2024    Procedure: RIGHT WOUND AND ACHILLES DEBRIDEMENT FOOT/TOE (WASH OUT); PARTIAL AMPUTATION DISTAL 2ND TOE;  Surgeon: Aaron Montero DPM;  [facility];  Service: Podiatry    WOUND DEBRIDEMENT Right 2/14/2024    Procedure: DEBRIDEMENT RIGHT GROIN  WOUND AND WASHOUT, APPLICATION OF WOUND VAC;  Surgeon: Suly Muro DO;  [facility];  Service: Vascular       FAMILY HISTORY:  Non-contributory    SOCIAL HISTORY:  Social History   /Civil Union, patient lives with her  who is disabled by Parkinson's disease, her daughter, 3 dogs and 3 cats.  Social History     Substance and Sexual Activity   Alcohol Use Not Currently     Social History     Substance and Sexual Activity   Drug Use Never     Social History     Tobacco Use   Smoking Status Every Day    Current packs/day: 1.00    Average packs/day: 1 pack/day for 63.1 years (63.1 ttl pk-yrs)    Types: Cigarettes    Start date: 1961   Smokeless Tobacco Never   Tobacco Comments    11/14/23 smokes about 3/4 PPD       ALLERGIES:  Allergies   Allergen Reactions    Paroxetine Other (See Comments)     Became suicidal    Adhesive [Medical Tape] Itching and Blisters    Carafate [Sucralfate] Other (See Comments)     Mouth sores, tongue sore    Sulfa Antibiotics Hives and Rash    Sulfamethoxazole-Trimethoprim Hives       MEDICATIONS:  All  current active medications have been reviewed.      PHYSICAL EXAM:  Temp:  [97.3 °F (36.3 °C)-98 °F (36.7 °C)] 98 °F (36.7 °C)  HR:  [60-67] 67  Resp:  [17-18] 18  BP: ()/(52-56) 105/56  SpO2:  [93 %-97 %] 93 %  Temp (24hrs), Av.7 °F (36.5 °C), Min:97.3 °F (36.3 °C), Max:98 °F (36.7 °C)  Current: Temperature: 98 °F (36.7 °C)    Intake/Output Summary (Last 24 hours) at 2/15/2024 2009  Last data filed at 2/15/2024 1837  Gross per 24 hour   Intake 2946.25 ml   Output 2550 ml   Net 396.25 ml       General Appearance:  Elderly, responsive, female, nontoxic, and in no distress, appears stated age   Head:  Normocephalic, without obvious abnormality, atraumatic   Eyes:  PERRL, conjunctiva pale and sclera anicteric, both eyes   Nose: Nares normal, mucosa normal, no drainage   Throat: Oropharynx moist without lesions; lips, mucosa, and tongue normal; teeth and gums normal   Neck: Supple, symmetrical, trachea midline, no adenopathy, no tenderness/mass/nodules   Back:   Symmetric, no curvature, ROM normal, no CVA tenderness   Lungs:   Clear to auscultation bilaterally, no audible wheezes, rhonchi and rales, respirations unlabored   Chest Wall:  No tenderness or deformity   Heart:  Regular rate and rhythm, S1, S2 normal, no murmur, rub or gallop   Abdomen:   Soft, non-tender, non-distended, positive bowel sounds, no masses, no organomegaly    No CVA tenderness   Extremities: Right groin with wound VAC in place, right foot and ankle with dry surgical dressing s/p distal second toe amputation   Skin: Skin color pale, as above   Neurologic: Alert and oriented times 3, extremity strength 5/5 and symmetric           Invasive Devices:   Peripheral IV 24 Distal;Right;Ventral (anterior) Forearm (Active)   Site Assessment WDL 02/15/24 09   Dressing Type Transparent 02/15/24 09   Line Status Flushed;Infusing 02/15/24 0900   Dressing Status Clean;Dry;Intact 02/15/24 0900       Ureteral Internal Stent Left ureter 6 Fr.  (Active)       LABS, IMAGING, & OTHER STUDIES:  Lab Results:      I have personally reviewed pertinent labs.    Results from last 7 days   Lab Units 02/15/24  0602 02/14/24 0446 02/13/24  0453   WBC Thousand/uL 12.41* 18.55* 14.83*   HEMOGLOBIN g/dL 9.8* 11.0* 10.4*   PLATELETS Thousands/uL 347 391* 404*     Results from last 7 days   Lab Units 02/15/24  0602 02/14/24 0446 02/13/24 0453   SODIUM mmol/L 136 138 137   POTASSIUM mmol/L 4.3 3.8 4.4   CHLORIDE mmol/L 105 99 101   CO2 mmol/L 27 31 30   BUN mg/dL 15 21 25   CREATININE mg/dL 0.41* 0.67 0.59*   EGFR ml/min/1.73sq m 99 85 88   CALCIUM mg/dL 9.2 10.0 9.5     Results from last 7 days   Lab Units 02/14/24  1539 02/14/24  1534 02/13/24  1920 02/13/24  1750   BLOOD CULTURE   --   --   --  No Growth at 24 hrs.  No Growth at 24 hrs.   GRAM STAIN RESULT  1+ Disintegrating polys*  1+ Gram positive rods* No Polys or Bacteria seen  --   --    URINE CULTURE   --   --  >100,000 cfu/ml Enterobacter cloacae*  --    WOUND CULTURE  Culture results to follow.  --   --   --        Imaging Studies:   I have personally reviewed pertinent imaging study reports and images in PACS.        EKG, Pathology, and Other Studies:   I have personally reviewed pertinent reports.

## 2024-02-16 NOTE — ASSESSMENT & PLAN NOTE
Lab Results   Component Value Date    HGBA1C 6.5 (H) 11/07/2023       Recent Labs     02/15/24  1606 02/15/24  2103 02/16/24  0720 02/16/24  1117   POCGLU 110 254* 168* 196*         Blood Sugar Average: Last 72 hrs:  (P) [id number]  hold metformin while inpatient  Diabetic diet  Fingersticks QID with sliding scale coverage

## 2024-02-16 NOTE — PROGRESS NOTES
"[facility] Podiatry - Progress Note  Patient: Lona Cedeno 78 y.o. female   MRN: [id number]  PCP: Vivek Preston, DO  Unit/Bed#: [facility] 815-01 Encounter: [id number]  Date Of Visit: 24    ASSESSMENT:    Lona Cedeno is a 78 y.o. female with:    Right posterior ankle ulcer with exposed achilles tendon  - Right wound debridement (DOS: 24)  Right medial ankle ulcer, limited to breakdown of skin  Osteomyelitis of right second toe  - Right 2nd toe amputation (DOS: 24)  T2DM  PAD  Tobacco use disorder  Diabetic polyneuropathy    PLAN:    Patent seen at bedside today. All wounds noted to be stable at this time with no acute clinical signs of infection including partial right 2nd digit amputation site. Granulation noted to posterior leg wound.   Wound vac noted to be functioning without error at 125 continuous after dressing change.   Plan to continue wound vac therapy at this time. Next change on 2024. Patient may benefit from STSG application to right lower extremity at a later date. No concrete OR plans at this time.   Podiatry to do all dressing changes to the right lower extremity at this time.   Elevation on green foam wedges or pillows when non-ambulatory. Prevalon boots ordered and recommended to be worn while non ambulatory.   Rest of care per primary team.    Wound VAC application  1. Number of sponges: 1 black   2. Pressure settin continuous  3. Size of wound: 5.8 cm x 4.0 cm x 0.3 cm  4. Description of wound: granular in nature     Weight bearing status: Nonweightbearing to right lower extremity       SUBJECTIVE:     The patient was seen, evaluated, and assessed at bedside today. The patient was awake, alert, and in no acute distress. No acute events overnight. The patient reports minimal pain today. Patient denies N/V/F/chills/SOB/CP.      OBJECTIVE:     Vitals:   /61   Pulse 75   Temp 98.5 °F (36.9 °C)   Resp 15   Ht 5' 1\" (1.549 m)   Wt 68.9 kg (151 lb 14.4 oz)   SpO2 93% "   BMI 28.70 kg/m²     Temp (24hrs), Av °F (36.7 °C), Min:97.7 °F (36.5 °C), Max:98.5 °F (36.9 °C)      Physical Exam:     General:  Alert, cooperative, and in no distress.  Lower extremity exam:  Cardiovascular status at baseline.  Neurological status at baseline.  S/p right partial 2nd digit amputation. No calf tenderness noted.     Right Lower Extremity: S/p partial 2nd digit amputation. Incision site stable with sutures intact and skin edges well aligned. Capillary refill to skin edges < 3 seconds. Skin temperature within normal limits. No evidence of dehiscence. No signs of active infection: no purulence, no malodor, no ascending erythema, no crepitus, no fluctuance. Residual erythema and edema consistent with post-operative healing course.      Wound #: 1  Location: Right posterior leg  Length 5.8 cm: Width 4.0 cm: Depth 0.3cm:   Deepest Tissue Noted in Base: tendon  Probe to Bone: No  Peripheral Skin Description: Attached  Granulation: 80% Fibrotic Tissue: 20% Necrotic Tissue: 0%   Drainage Amount: moderate bloody  Signs of Infection: No     Wound #: 2  Location: Right medial foot  Length 2.2 cm: Width 2.8 cm: Depth 0.2 cm:   Deepest Tissue Noted in Base: subcutaneous  Probe to Bone: No  Peripheral Skin Description: Attached  Granulation: 90% Fibrotic Tissue: 10% Necrotic Tissue: 0%   Drainage Amount: moderate serosanguinous  Signs of Infection: No     Clinical Images 24:                Additional Data:     Labs:    Results from last 7 days   Lab Units 24  0509 02/15/24  0602   WBC Thousand/uL 10.95* 12.41*   HEMOGLOBIN g/dL 9.6* 9.8*   HEMATOCRIT % 31.4* 31.8*   PLATELETS Thousands/uL 354 347   NEUTROS PCT %  --  87*   LYMPHS PCT %  --  9*   MONOS PCT %  --  3*   EOS PCT %  --  0     Results from last 7 days   Lab Units 24  0509   POTASSIUM mmol/L 4.1   CHLORIDE mmol/L 105   CO2 mmol/L 26   BUN mg/dL 12   CREATININE mg/dL 0.56*   CALCIUM mg/dL 8.7     Results from last 7 days   Lab  "Units 02/14/24  0446   INR  1.16       * I Have Reviewed All Lab Data Listed Above.    Recent Cultures (last 7 days):     Results from last 7 days   Lab Units 02/14/24  1539 02/14/24  1534 02/13/24  1920 02/13/24  1750   BLOOD CULTURE   --   --   --  No Growth at 48 hrs.  No Growth at 48 hrs.   GRAM STAIN RESULT  1+ Disintegrating polys*  1+ Gram positive rods* No Polys or Bacteria seen  --   --    URINE CULTURE   --   --  >100,000 cfu/ml Enterobacter cloacae*  --    WOUND CULTURE  1+ Growth of Klebsiella pneumoniae*  1+ Growth of Proteus vulgaris group*  Few Colonies of  --   --   --      Results from last 7 days   Lab Units 02/14/24  1539 02/14/24  1534   ANAEROBIC CULTURE  Culture results to follow. No anaerobes isolated       Imaging: I have personally reviewed pertinent films in PACS  EKG, Pathology, and Other Studies: I have personally reviewed pertinent reports.      ** Please Note: Portions of the record may have been created with voice recognition software. Occasional wrong word or \"sound a like\" substitutions may have occurred due to the inherent limitations of voice recognition software. Read the chart carefully and recognize, using context, where substitutions have occurred. **      "

## 2024-02-16 NOTE — ASSESSMENT & PLAN NOTE
Fever/leukocytosis at [facility] prior to arrival here, additionally with lactic acidosis resolved following IV fluids  Concern for surgical site infection at groin, s/p waschout with vac placement on 2/14 with vascular surgery   Received cefepime/vancomycin at [facility] prior to transfer to medical floor-currently on cefepime, vancomycin DC'd 2/16/2024  ID consult appreciated  Blood cultures NGTD  Covid/flu/rsv negative   Wound culture prelim 2/14-Klebsiella and Proteus growing  Anaerobic culture 2/14/2024-pending  Urine culture-prelim> 100,000 enterobacter cloacae noted to be resistant to cefepime

## 2024-02-17 LAB
ANION GAP SERPL CALCULATED.3IONS-SCNC: 4 MMOL/L
BUN SERPL-MCNC: 10 MG/DL (ref 5–25)
CALCIUM SERPL-MCNC: 9.2 MG/DL (ref 8.4–10.2)
CHLORIDE SERPL-SCNC: 108 MMOL/L (ref 96–108)
CO2 SERPL-SCNC: 28 MMOL/L (ref 21–32)
CREAT SERPL-MCNC: 0.47 MG/DL (ref 0.6–1.3)
ERYTHROCYTE [DISTWIDTH] IN BLOOD BY AUTOMATED COUNT: 14.8 % (ref 11.6–15.1)
GFR SERPL CREATININE-BSD FRML MDRD: 95 ML/MIN/1.73SQ M
GLUCOSE SERPL-MCNC: 160 MG/DL (ref 65–140)
GLUCOSE SERPL-MCNC: 161 MG/DL (ref 65–140)
GLUCOSE SERPL-MCNC: 161 MG/DL (ref 65–140)
GLUCOSE SERPL-MCNC: 175 MG/DL (ref 65–140)
GLUCOSE SERPL-MCNC: 86 MG/DL (ref 65–140)
HCT VFR BLD AUTO: 31.8 % (ref 34.8–46.1)
HGB BLD-MCNC: 9.8 G/DL (ref 11.5–15.4)
MCH RBC QN AUTO: 31.1 PG (ref 26.8–34.3)
MCHC RBC AUTO-ENTMCNC: 30.8 G/DL (ref 31.4–37.4)
MCV RBC AUTO: 101 FL (ref 82–98)
PLATELET # BLD AUTO: 358 THOUSANDS/UL (ref 149–390)
PMV BLD AUTO: 9.5 FL (ref 8.9–12.7)
POTASSIUM SERPL-SCNC: 3.8 MMOL/L (ref 3.5–5.3)
RBC # BLD AUTO: 3.15 MILLION/UL (ref 3.81–5.12)
SODIUM SERPL-SCNC: 140 MMOL/L (ref 135–147)
WBC # BLD AUTO: 8.41 THOUSAND/UL (ref 4.31–10.16)

## 2024-02-17 PROCEDURE — 99231 SBSQ HOSP IP/OBS SF/LOW 25: CPT | Performed by: INTERNAL MEDICINE

## 2024-02-17 PROCEDURE — 99024 POSTOP FOLLOW-UP VISIT: CPT | Performed by: SURGERY

## 2024-02-17 PROCEDURE — 82948 REAGENT STRIP/BLOOD GLUCOSE: CPT

## 2024-02-17 PROCEDURE — 83918 ORGANIC ACIDS TOTAL QUANT: CPT | Performed by: NURSE PRACTITIONER

## 2024-02-17 PROCEDURE — 99232 SBSQ HOSP IP/OBS MODERATE 35: CPT | Performed by: NURSE PRACTITIONER

## 2024-02-17 PROCEDURE — 85027 COMPLETE CBC AUTOMATED: CPT | Performed by: NURSE PRACTITIONER

## 2024-02-17 PROCEDURE — 80048 BASIC METABOLIC PNL TOTAL CA: CPT | Performed by: NURSE PRACTITIONER

## 2024-02-17 RX ADMIN — ACETAMINOPHEN 975 MG: 325 TABLET, FILM COATED ORAL at 21:20

## 2024-02-17 RX ADMIN — CEFEPIME 1000 MG: 1 INJECTION, POWDER, FOR SOLUTION INTRAMUSCULAR; INTRAVENOUS at 05:27

## 2024-02-17 RX ADMIN — SODIUM CHLORIDE 75 ML/HR: 0.9 INJECTION, SOLUTION INTRAVENOUS at 05:28

## 2024-02-17 RX ADMIN — FLUTICASONE FUROATE AND VILANTEROL TRIFENATATE 1 PUFF: 200; 25 POWDER RESPIRATORY (INHALATION) at 09:38

## 2024-02-17 RX ADMIN — OMEGA-3 FATTY ACIDS CAP 1000 MG 1000 MG: 1000 CAP at 09:37

## 2024-02-17 RX ADMIN — NICOTINE 1 PATCH: 14 PATCH, EXTENDED RELEASE TRANSDERMAL at 09:37

## 2024-02-17 RX ADMIN — HEPARIN SODIUM 5000 UNITS: 5000 INJECTION INTRAVENOUS; SUBCUTANEOUS at 05:27

## 2024-02-17 RX ADMIN — POLYETHYLENE GLYCOL 3350 17 G: 17 POWDER, FOR SOLUTION ORAL at 09:40

## 2024-02-17 RX ADMIN — ASPIRIN 81 MG: 81 TABLET, COATED ORAL at 09:37

## 2024-02-17 RX ADMIN — METHOCARBAMOL 250 MG: 500 TABLET ORAL at 05:28

## 2024-02-17 RX ADMIN — RIVAROXABAN 2.5 MG: 2.5 TABLET, FILM COATED ORAL at 09:38

## 2024-02-17 RX ADMIN — DOCUSATE SODIUM 100 MG: 100 CAPSULE, LIQUID FILLED ORAL at 09:37

## 2024-02-17 RX ADMIN — CEFEPIME 1000 MG: 1 INJECTION, POWDER, FOR SOLUTION INTRAMUSCULAR; INTRAVENOUS at 18:16

## 2024-02-17 RX ADMIN — OXYCODONE HYDROCHLORIDE 5 MG: 5 TABLET ORAL at 14:49

## 2024-02-17 RX ADMIN — ATORVASTATIN CALCIUM 10 MG: 10 TABLET, FILM COATED ORAL at 17:40

## 2024-02-17 RX ADMIN — PREGABALIN 200 MG: 100 CAPSULE ORAL at 17:40

## 2024-02-17 RX ADMIN — CYANOCOBALAMIN 1000 MCG: 1000 INJECTION, SOLUTION INTRAMUSCULAR; SUBCUTANEOUS at 09:47

## 2024-02-17 RX ADMIN — PREGABALIN 200 MG: 100 CAPSULE ORAL at 09:37

## 2024-02-17 RX ADMIN — OXYBUTYNIN CHLORIDE 5 MG: 5 TABLET, EXTENDED RELEASE ORAL at 09:37

## 2024-02-17 RX ADMIN — Medication 1 TABLET: at 12:23

## 2024-02-17 RX ADMIN — OXYCODONE HYDROCHLORIDE 5 MG: 5 TABLET ORAL at 10:29

## 2024-02-17 RX ADMIN — INSULIN LISPRO 1 UNITS: 100 INJECTION, SOLUTION INTRAVENOUS; SUBCUTANEOUS at 12:21

## 2024-02-17 RX ADMIN — CYANOCOBALAMIN TAB 500 MCG 1000 MCG: 500 TAB at 13:03

## 2024-02-17 RX ADMIN — CLONAZEPAM 1 MG: 1 TABLET ORAL at 17:40

## 2024-02-17 RX ADMIN — BUPROPION HYDROCHLORIDE 300 MG: 150 TABLET, EXTENDED RELEASE ORAL at 09:37

## 2024-02-17 RX ADMIN — PANTOPRAZOLE SODIUM 40 MG: 40 TABLET, DELAYED RELEASE ORAL at 09:41

## 2024-02-17 RX ADMIN — OXYCODONE HYDROCHLORIDE 5 MG: 5 TABLET ORAL at 18:38

## 2024-02-17 RX ADMIN — PREGABALIN 200 MG: 100 CAPSULE ORAL at 21:19

## 2024-02-17 RX ADMIN — DOCUSATE SODIUM 100 MG: 100 CAPSULE, LIQUID FILLED ORAL at 17:40

## 2024-02-17 RX ADMIN — METHOCARBAMOL 250 MG: 500 TABLET ORAL at 21:20

## 2024-02-17 RX ADMIN — RIVAROXABAN 2.5 MG: 2.5 TABLET, FILM COATED ORAL at 17:41

## 2024-02-17 RX ADMIN — METHOCARBAMOL 250 MG: 500 TABLET ORAL at 14:51

## 2024-02-17 RX ADMIN — ACETAMINOPHEN 975 MG: 325 TABLET, FILM COATED ORAL at 14:51

## 2024-02-17 RX ADMIN — INSULIN LISPRO 1 UNITS: 100 INJECTION, SOLUTION INTRAVENOUS; SUBCUTANEOUS at 09:38

## 2024-02-17 RX ADMIN — ACETAMINOPHEN 975 MG: 325 TABLET, FILM COATED ORAL at 05:27

## 2024-02-17 NOTE — CONSULTS
Vancomycin IV Pharmacy-to-Dose Consultation    Lona Cedeno is a 78 y.o. female who was receiving Vancomycin IV with management by the Pharmacy Consult service for treatment of Soft tissue (goal -600, trough >10)  .       The patient’s Vancomycin therapy has been completed / discontinued. Thank you for allowing us to take part in this patient's care. Pharmacy will sign-off now; please call or re-consult if there are any questions.        Sebastian May, ErnstD

## 2024-02-17 NOTE — PROGRESS NOTES
Progress Note - Vascular Surgery   Lona Cedeno 78 y.o. female [id number]  Unit/Bed#:[facility] 815-01 Encounter: [id number]      Assessment:    78 y.o. with PMH HTN, HLD, COPD, DM, Right Ankle ORIF presenting with AIOD, Right CLTI c exposed, necrotic heel wound.     Vascular surgery consulted for AIOD, Right CLTI c exposed necrotic heel wound.     1/31: Right Ax to Fem PTFE bypass  2/14 Rt Groin washout and vac  2/4 Rt Axillary I&D, Pods Achilles debridement, partial 2nd to amp, Hematoma evac.     Plan:  - Doing well post-op; viable, well perfused limb  - Cont VAC to Right groin; Change MWF  - Poss OR Monday for re-exploration of right groin, washout, vac change to ensure no graft exposure   - OOB as tolerated  - Diet as tolerated  - Abx  Cefepime  - 2/14 Bcx: NGTD  - 2/14 WCX: Klebseilla,  Proteus   - 2/13 Ucx: Enterobacter  - No evidence of arterial prosthetic graft infection on wound exploration 2/14.   - Right chest wall incision c/d/I, seroma improving   - VTEppx: SQH  - Cont Aspirin, Statin  - Can resume Xarelto 2.5mg BID  - Further recommendations pending clinical course     Subjective:    Pt s/e. No acute events overnight. Pt awake, alert.    Tolerating diet. Not OOB. Voiding.      Pain controlled. Admits to pain at surgical site.     No new complaints. Denies n/v, sob, chest pain, f/c. No sensory change, numbness, or weakness of lower extremities    I/Os:  I/O last 3 completed shifts:  In: 3448.8 [P.O.:740; I.V.:2708.8]  Out: 3800 [Urine:3800]  No intake/output data recorded.    Review of Systems   All other systems reviewed and are negative.      Vitals:    02/17/24 0720   BP: 109/65   Pulse: 75   Resp:    Temp: 99.2 °F (37.3 °C)   SpO2: 97%        Physical Exam  Vitals and nursing note reviewed.   Constitutional:       General: She is not in acute distress.     Appearance: She is well-developed. She is not diaphoretic.   HENT:      Head: Normocephalic and atraumatic.   Eyes:      Conjunctiva/sclera:  Conjunctivae normal.   Cardiovascular:      Rate and Rhythm: Normal rate and regular rhythm.      Heart sounds: No murmur heard.     Comments: RUE Palp brachial, radial pulse     LLE non-palp fem  RLE palp  Pop  Right ax-fem dop signal  RLE Dop DP  Pulmonary:      Effort: Pulmonary effort is normal. No respiratory distress.      Breath sounds: Normal breath sounds.   Abdominal:      Palpations: Abdomen is soft.      Tenderness: There is no abdominal tenderness.   Musculoskeletal:         General: Signs of injury present. No swelling.      Cervical back: Neck supple.      Comments: Right heel VAC  Right groin VAC  Right chest wall incision soft, c/d/I     Skin:     General: Skin is warm and dry.      Capillary Refill: Capillary refill takes less than 2 seconds.   Neurological:      General: No focal deficit present.      Mental Status: She is alert and oriented to person, place, and time. Mental status is at baseline.   Psychiatric:         Mood and Affect: Mood normal.         Imaging:I have personally reviewed pertinent reports.      Lab Results and Cultures:   Lab Results   Component Value Date    WBC 8.41 02/17/2024    HGB 9.8 (L) 02/17/2024    HCT 31.8 (L) 02/17/2024     (H) 02/17/2024     02/17/2024     Lab Results   Component Value Date    GLUCOSE 143 (H) 01/31/2024    CALCIUM 9.2 02/17/2024     05/21/2018    K 3.8 02/17/2024    CO2 28 02/17/2024     02/17/2024    BUN 10 02/17/2024    CREATININE 0.47 (L) 02/17/2024     Lab Results   Component Value Date    INR 1.16 02/14/2024    INR 1.03 02/04/2024    INR 1.11 02/03/2024    PROTIME 14.7 (H) 02/14/2024    PROTIME 13.4 02/04/2024    PROTIME 14.2 02/03/2024        Blood Culture:   Lab Results   Component Value Date    BLOODCX No Growth at 72 hrs. 02/13/2024    BLOODCX No Growth at 72 hrs. 02/13/2024   ,   Urinalysis:   Lab Results   Component Value Date    COLORU Light Yellow 02/14/2024    COLORU YELLOW 05/21/2018    CLARITYU Clear  02/14/2024    CLARITYU CLEAR 05/21/2018    SPECGRAV 1.009 02/14/2024    SPECGRAV 1.015 05/21/2018    PHUR 5.5 02/14/2024    PHUR 6.0 05/21/2018    LEUKOCYTESUR Large (A) 02/14/2024    LEUKOCYTESUR NEGATIVE 05/21/2018    NITRITE Negative 02/14/2024    NITRITE NEGATIVE 05/21/2018    PROTEINUA NEGATIVE 05/21/2018    GLUCOSEU Negative 02/14/2024    GLUCOSEU NEGATIVE 05/21/2018    KETONESU Negative 02/14/2024    KETONESU NEGATIVE 05/21/2018    BILIRUBINUR Negative 02/14/2024    BILIRUBINUR NEGATIVE 05/21/2018    BLOODU Negative 02/14/2024    BLOODU NEGATIVE 05/21/2018   ,   Urine Culture:   Lab Results   Component Value Date    URINECX >100,000 cfu/ml Enterobacter cloacae (A) 02/13/2024   ,   Wound Culure:   Lab Results   Component Value Date    WOUNDCULT 1+ Growth of Klebsiella pneumoniae (A) 02/14/2024    WOUNDCULT 1+ Growth of Proteus vulgaris group (A) 02/14/2024    WOUNDCULT Few Colonies of 02/14/2024       Medications:  Current Facility-Administered Medications   Medication Dose Route Frequency    acetaminophen (TYLENOL) tablet 975 mg  975 mg Oral Q8H JILLIAN    albuterol (PROVENTIL HFA,VENTOLIN HFA) inhaler 2 puff  2 puff Inhalation Q6H PRN    aspirin (ECOTRIN LOW STRENGTH) EC tablet 81 mg  81 mg Oral Daily    atorvastatin (LIPITOR) tablet 10 mg  10 mg Oral Daily With Dinner    bisacodyl (DULCOLAX) rectal suppository 10 mg  10 mg Rectal Daily PRN    buPROPion (WELLBUTRIN XL) 24 hr tablet 300 mg  300 mg Oral Daily    cefepime (MAXIPIME) 1,000 mg in dextrose 5 % 50 mL IVPB  1,000 mg Intravenous Q12H    clonazePAM (KlonoPIN) tablet 1 mg  1 mg Oral QPM    cyanocobalamin injection 1,000 mcg  1,000 mcg Intramuscular Q30 Days    docusate sodium (COLACE) capsule 100 mg  100 mg Oral BID    fish oil capsule 1,000 mg  1,000 mg Oral Daily    fluticasone-vilanterol 200-25 mcg/actuation 1 puff  1 puff Inhalation Daily    heparin (porcine) subcutaneous injection 5,000 Units  5,000 Units Subcutaneous Q8H JILLIAN    insulin lispro  (HumaLOG) 100 units/mL subcutaneous injection 1-5 Units  1-5 Units Subcutaneous TID With Meals    methocarbamol (ROBAXIN) tablet 250 mg  250 mg Oral Q8H JILLIAN    nicotine (NICODERM CQ) 14 mg/24hr TD 24 hr patch 1 patch  1 patch Transdermal Daily    ondansetron (ZOFRAN) injection 4 mg  4 mg Intravenous Q6H PRN    oxybutynin (DITROPAN-XL) 24 hr tablet 5 mg  5 mg Oral Daily    oxyCODONE (ROXICODONE) split tablet 2.5 mg  2.5 mg Oral Q4H PRN    Or    oxyCODONE (ROXICODONE) IR tablet 5 mg  5 mg Oral Q4H PRN    pantoprazole (PROTONIX) EC tablet 40 mg  40 mg Oral Early Morning    polyethylene glycol (MIRALAX) packet 17 g  17 g Oral Daily    pregabalin (LYRICA) capsule 200 mg  200 mg Oral TID    rivaroxaban (XARELTO) tablet 2.5 mg  2.5 mg Oral BID With Meals    senna (SENOKOT) tablet 17.2 mg  2 tablet Oral Daily PRN    sodium chloride 0.9 % infusion  75 mL/hr Intravenous Continuous       Patient Active Problem List   Diagnosis    Type 2 diabetes mellitus with diabetic polyneuropathy (HCC)    Essential hypertension    Anxiety and depression    Medicare annual wellness visit, subsequent    GERD without esophagitis    Urinary incontinence    Degenerative lumbar disc    Lactic acidosis    Hypomagnesemia    Bladder neoplasm    Left renal mass    PAD (peripheral artery disease) (HCC)    Abnormal CT of the chest    COPD, severity to be determined ([facility])    Tobacco use disorder    Age-related osteoporosis without current pathological fracture    Ankle ulcer, right, with fat layer exposed ([facility])    Diabetic foot ulcer with osteomyelitis ([facility])    Preoperative cardiovascular examination    Constipation    Hematoma    At risk for venous thromboembolism (VTE)    At high risk for skin breakdown    Wound of skin    Impaired mobility and activities of daily living    Acute pain due to injury    Leukocytosis    Surgical site infection    Sepsis without acute organ dysfunction (HCC)    Other dietary vitamin B12 deficiency anemia       Past  Surgical History:   Procedure Laterality Date    ANKLE FRACTURE SURGERY Right     has screw in place     SECTION      x2    CHOLECYSTECTOMY      CYSTOSCOPY W/ LASER LITHOTRIPSY Left 2023    Procedure: CYSTOSCOPY; RETROGRADE; URETEROSCOPY; BIOPSY; LEFT -INSERTION OF STENT;  Surgeon: Cristian Child MD;  Location: [facility];  Service: Urology    CYSTOSCOPY W/ LASER LITHOTRIPSY Left 2023    Procedure: CYSTOSCOPY; RETROGRADE; URETEROSCOPY; 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;  Surgeon: Cristian Child MD;  Location: [facility];  Service: Urology    EVACUATION OF HEMATOMA Right 2024    Procedure: EVACUATION/ DRAINAGE CHEST WALL  HEMATOMA;  Surgeon: Seth Hoyos MD;  Location: [facility];  Service: Vascular    FL RETROGRADE PYELOGRAM  2023    HERNIA REPAIR      umbilicus w/ mesh    PA BYPASS W/VEIN AXILLARY-FEMORAL Right 2024    Procedure: BYPASS AXILLO-FEMORAL, RIGHT WITH 8MM RINGED PTFE GRAFT;  Surgeon: Seth Hoyos MD;  Location: [facility];  Service: Vascular    WOUND DEBRIDEMENT Right 2024    Procedure: RIGHT WOUND AND ACHILLES DEBRIDEMENT FOOT/TOE (WASH OUT); PARTIAL AMPUTATION DISTAL 2ND TOE;  Surgeon: Aarno Montero DPM;  Location: [facility];  Service: Podiatry    WOUND DEBRIDEMENT Right 2024    Procedure: DEBRIDEMENT RIGHT GROIN  WOUND AND WASHOUT, APPLICATION OF WOUND VAC;  Surgeon: Suly Muro DO;  Location: [facility];  Service: Vascular       Family History   Problem Relation Age of Onset    COPD Mother     Emphysema Mother     COPD Father     Emphysema Father        Social History     Socioeconomic History    Marital status: /Civil Union     Spouse name: Not on file    Number of children: Not on file    Years of education: Not on file    Highest education level: Not on file   Occupational History    Not on file   Tobacco Use    Smoking status: Every Day     Current packs/day: 1.00     Average packs/day: 1  pack/day for 63.1 years (63.1 ttl pk-yrs)     Types: Cigarettes     Start date: 1961    Smokeless tobacco: Never    Tobacco comments:     11/14/23 smokes about 3/4 PPD   Vaping Use    Vaping status: Never Used   Substance and Sexual Activity    Alcohol use: Not Currently    Drug use: Never    Sexual activity: Not Currently   Other Topics Concern    Not on file   Social History Narrative    Not on file     Social Determinants of Health     Financial Resource Strain: Low Risk  (2/9/2023)    Overall Financial Resource Strain (CARDIA)     Difficulty of Paying Living Expenses: Not hard at all   Food Insecurity: No Food Insecurity (1/24/2024)    Hunger Vital Sign     Worried About Running Out of Food in the Last Year: Never true     Ran Out of Food in the Last Year: Never true   Transportation Needs: No Transportation Needs (1/24/2024)    PRAPARE - Transportation     Lack of Transportation (Medical): No     Lack of Transportation (Non-Medical): No   Physical Activity: Not on file   Stress: Not on file   Social Connections: Not on file   Intimate Partner Violence: Not on file   Housing Stability: Low Risk  (1/24/2024)    Housing Stability Vital Sign     Unable to Pay for Housing in the Last Year: No     Number of Places Lived in the Last Year: 1     Unstable Housing in the Last Year: No       Allergies   Allergen Reactions    Paroxetine Other (See Comments)     Became suicidal    Adhesive [Medical Tape] Itching and Blisters    Carafate [Sucralfate] Other (See Comments)     Mouth sores, tongue sore    Sulfa Antibiotics Hives and Rash    Sulfamethoxazole-Trimethoprim Hives

## 2024-02-17 NOTE — ASSESSMENT & PLAN NOTE
Continue with bowel regimen  Patient was hesitant to take MiraLAX due to concern for having to have a bowel movement on the bedpan.  Discussed the importance of taking the bowel regimen and moving her bowels while hospitalized to avoid obstruction

## 2024-02-17 NOTE — PROGRESS NOTES
[facility]  Progress Note  Name: Lona Cedeno I  MRN: [id number]  Unit/Bed#: [facility] 815-01 I Date of Admission: 2/13/2024   Date of Service: 2/17/2024 I Hospital Day: 4    Assessment/Plan   * Surgical site infection  Assessment & Plan  Continue cefepime, vancomycin DC'd 2/16/2024  MRSA screen negative  Vascular surgery following status post washout with VAC placement on 2/14/2024  Per vascular note possible OR Monday for re-exploration of right groin, washout, vac change to ensure no graft exposure    Infectious disease following  Follow or cultures    Sepsis without acute organ dysfunction (HCC)  Assessment & Plan  Fever/leukocytosis at [facility] prior to arrival here, additionally with lactic acidosis resolved following IV fluids  Concern for surgical site infection at groin, s/p waschout with vac placement on 2/14 with vascular surgery   Received cefepime/vancomycin at [facility] prior to transfer to medical floor-currently on cefepime, vancomycin DC'd 2/16/2024  ID consult appreciated  Blood cultures NGTD  Covid/flu/rsv negative   Wound culture prelim 2/14-Klebsiella and Proteus growing  Anaerobic culture 2/14/2024-pending  Urine culture-prelim> 100,000 enterobacter cloacae noted to be resistant to cefepime      Other dietary vitamin B12 deficiency anemia  Assessment & Plan  B12 level 93  pending MMA   S/p b12 injection 2/17  Start PO supplements    Constipation  Assessment & Plan  Continue with bowel regimen  Patient was hesitant to take MiraLAX due to concern for having to have a bowel movement on the bedpan.  Discussed the importance of taking the bowel regimen and moving her bowels while hospitalized to avoid obstruction    Diabetic foot ulcer with osteomyelitis (HCC)  Assessment & Plan  Podiatry assistance appreciated  Wound vac MWF       Tobacco use disorder  Assessment & Plan  Continue with nicotine patch  Encourage cessation    COPD, severity to be determined (HCC)  Assessment &  Plan  Respiratory status is stable  Continue maintenance inhalers and PRN bronchodilators    PAD (peripheral artery disease) ([facility])  Assessment & Plan  1/31 BYPASS AXILLO-FEMORAL, RIGHT WITH 8MM RINGED PTFE GRAFT   Continue aspirin, statin, xarelto 2.5 mg bid     Essential hypertension  Assessment & Plan  Hypotension noted post op- now resolved   C/w holding amiloride, can restart later is indicated    Type 2 diabetes mellitus with diabetic polyneuropathy ([facility])  Assessment & Plan  Lab Results   Component Value Date    HGBA1C 6.5 (H) 11/07/2023       Recent Labs     02/15/24  1606 02/15/24  2103 02/16/24  0720 02/16/24  1117   POCGLU 110 254* 168* 196*         Blood Sugar Average: Last 72 hrs:  (P) [id number]  hold metformin while inpatient  Diabetic diet  Fingersticks QID with sliding scale coverage             VTE Pharmacologic Prophylaxis: VTE Score: 3 Moderate Risk (Score 3-4) - Pharmacological DVT Prophylaxis Ordered: rivaroxaban (Xarelto).    Mobility:   Basic Mobility Inpatient Raw Score: 14  -HLM Goal: 4: Move to chair/commode  JH-HLM Achieved: 3: Sit at edge of bed  HLM Goal NOT achieved. Continue with multidisciplinary rounding and encourage appropriate mobility to improve upon HLM goals.    Patient Centered Rounds: I performed bedside rounds with nursing staff today.   Discussions with Specialists or Other Care Team Provider: d/w RN     Education and Discussions with Family / Patient: Patient declined call to .     Total Time Spent on Date of Encounter in care of patient: 35 mins. This time was spent on one or more of the following: performing physical exam; counseling and coordination of care; obtaining or reviewing history; documenting in the medical record; reviewing/ordering tests, medications or procedures; communicating with other healthcare professionals and discussing with patient's family/caregivers.    Current Length of Stay: 4 day(s)  Current Patient Status: Inpatient    Certification Statement: The patient will continue to require additional inpatient hospital stay due to iv abxs ongoing vac management by vascular ? OR Monday   Discharge Plan: Anticipate discharge in 48-72 hrs to rehab facility.    Code Status: Level 3 - DNAR and DNI    Subjective:   Patient lying in bed.  Reports that she is feeling down today because she just wants to go home.  No bowel movement reported and noting more abdominal distention.  Patient reports that she is passing flatus denies nausea or vomiting.  Patient has been deferring MiraLAX because she is concerned about having a bowel movement on the bedpan.  Patient was educated on the importance of continuing bowel regimen to ensure that she does not develop an obstruction.  Assisted RN in placing the patient sitting upright on the side of the bed.  Patient denies all other complaints.    Objective:     Vitals:   Temp (24hrs), Av.8 °F (37.1 °C), Min:98.5 °F (36.9 °C), Max:99.2 °F (37.3 °C)    Temp:  [98.5 °F (36.9 °C)-99.2 °F (37.3 °C)] 99.2 °F (37.3 °C)  HR:  [70-75] 75  Resp:  [15-16] 16  BP: (101-109)/(61-65) 109/65  SpO2:  [92 %-97 %] 97 %  Body mass index is 28.7 kg/m².     Input and Output Summary (last 24 hours):     Intake/Output Summary (Last 24 hours) at 2024 1008  Last data filed at 2024 1001  Gross per 24 hour   Intake 2308.75 ml   Output 2700 ml   Net -391.25 ml       Physical Exam:   Physical Exam  Vitals and nursing note reviewed.   Constitutional:       General: She is not in acute distress.     Appearance: She is well-developed. She is not ill-appearing.   Cardiovascular:      Rate and Rhythm: Normal rate and regular rhythm.      Heart sounds: Normal heart sounds. No murmur heard.  Pulmonary:      Effort: Pulmonary effort is normal. No respiratory distress.      Breath sounds: Normal breath sounds. No wheezing or rales.   Abdominal:      General: Bowel sounds are normal. There is distension.      Palpations: Abdomen is  soft.      Tenderness: There is no abdominal tenderness.   Musculoskeletal:         General: No swelling.   Skin:     General: Skin is warm and dry.      Comments: VAC in right groin and right foot   Neurological:      Mental Status: She is alert. Mental status is at baseline.   Psychiatric:         Mood and Affect: Mood is depressed.          Additional Data:     Labs:  Results from last 7 days   Lab Units 02/17/24  0612 02/16/24  0509 02/15/24  0602   WBC Thousand/uL 8.41   < > 12.41*   HEMOGLOBIN g/dL 9.8*   < > 9.8*   HEMATOCRIT % 31.8*   < > 31.8*   PLATELETS Thousands/uL 358   < > 347   NEUTROS PCT %  --   --  87*   LYMPHS PCT %  --   --  9*   MONOS PCT %  --   --  3*   EOS PCT %  --   --  0    < > = values in this interval not displayed.     Results from last 7 days   Lab Units 02/17/24  0612   SODIUM mmol/L 140   POTASSIUM mmol/L 3.8   CHLORIDE mmol/L 108   CO2 mmol/L 28   BUN mg/dL 10   CREATININE mg/dL 0.47*   ANION GAP mmol/L 4   CALCIUM mg/dL 9.2   GLUCOSE RANDOM mg/dL 160*     Results from last 7 days   Lab Units 02/14/24  0446   INR  1.16     Results from last 7 days   Lab Units 02/17/24  0718 02/16/24  2059 02/16/24  1617 02/16/24  1117 02/16/24  0720 02/15/24  2103 02/15/24  1606 02/15/24  1124 02/15/24  0734 02/14/24  2055 02/14/24  1602 02/14/24  1149   POC GLUCOSE mg/dl 161* 164* 132 196* 168* 254* 110 167* 159* 186* 120 158*         Results from last 7 days   Lab Units 02/15/24  0602 02/14/24  0446 02/13/24  2129 02/13/24  1750 02/13/24  1427   LACTIC ACID mmol/L  --   --  1.7 2.3*  --    PROCALCITONIN ng/ml 0.39* 0.52*  --   --  0.06       Lines/Drains:  Invasive Devices       Peripheral Intravenous Line  Duration             Peripheral IV 02/16/24 Right Forearm <1 day              Drain  Duration             Ureteral Internal Stent Left ureter 6 Fr. 89 days                          Imaging: Reviewed radiology reports from this admission including: chest xray    Recent Cultures (last 7 days):    Results from last 7 days   Lab Units 02/14/24  1539 02/14/24  1534 02/13/24  1920 02/13/24  1750   BLOOD CULTURE   --   --   --  No Growth at 72 hrs.  No Growth at 72 hrs.   GRAM STAIN RESULT  1+ Disintegrating polys*  1+ Gram positive rods* No Polys or Bacteria seen  --   --    URINE CULTURE   --   --  >100,000 cfu/ml Enterobacter cloacae*  --    WOUND CULTURE  1+ Growth of Klebsiella pneumoniae*  1+ Growth of Proteus vulgaris group*  Few Colonies of  --   --   --        Last 24 Hours Medication List:   Current Facility-Administered Medications   Medication Dose Route Frequency Provider Last Rate    acetaminophen  975 mg Oral Q8H [facility] Himanshu Overton MD      albuterol  2 puff Inhalation Q6H PRN Himanshu Overton MD      aspirin  81 mg Oral Daily Himanshu Overton MD      atorvastatin  10 mg Oral Daily With Dinner Himanshu Overton MD      bisacodyl  10 mg Rectal Daily PRN ROXANE Munoz      buPROPion  300 mg Oral Daily Himanshu Overton MD      cefepime  1,000 mg Intravenous Q12H Himanshu Overton MD 1,000 mg (02/17/24 0527)    clonazePAM  1 mg Oral QPM Himanshu Overton MD      vitamin B-12  1,000 mcg Oral Daily ROXANE Munoz      cyanocobalamin  1,000 mcg Intramuscular Q30 Days ROXANE Munoz      docusate sodium  100 mg Oral BID Himanshu Overton MD      fish oil  1,000 mg Oral Daily Himanshu Overton MD      fluticasone-vilanterol  1 puff Inhalation Daily Himanshu Overton MD      insulin lispro  1-5 Units Subcutaneous TID With Meals Amrita Paul MD      methocarbamol  250 mg Oral Q8H [facility] Himanshu Overton MD      nicotine  1 patch Transdermal Daily Himanshu Overton MD      ondansetron  4 mg Intravenous Q6H PRN Himanshu Overton MD      oxybutynin  5 mg Oral Daily Himanshu Overton MD      oxyCODONE  2.5 mg Oral Q4H PRN Himanshu Overton MD      Or    oxyCODONE  5 mg Oral Q4H PRN Himanshu Overton MD      pantoprazole  40 mg Oral Early Morning Himanshu Overton MD      polyethylene glycol  17 g Oral Daily  ROXANE Munoz      pregabalin  200 mg Oral TID Himanshu Overton MD      rivaroxaban  2.5 mg Oral BID With Meals Felipa Foy PA-C      senna  2 tablet Oral Daily PRN Enzo Elam DO          Today, Patient Was Seen By: ROXANE Munoz    **Please Note: This note may have been constructed using a voice recognition system.**

## 2024-02-18 LAB
ANION GAP SERPL CALCULATED.3IONS-SCNC: 4 MMOL/L
BACTERIA BLD CULT: NORMAL
BACTERIA BLD CULT: NORMAL
BACTERIA SPEC ANAEROBE CULT: NORMAL
BACTERIA TISS AEROBE CULT: ABNORMAL
BACTERIA WND AEROBE CULT: ABNORMAL
BUN SERPL-MCNC: 10 MG/DL (ref 5–25)
CALCIUM SERPL-MCNC: 9.6 MG/DL (ref 8.4–10.2)
CHLORIDE SERPL-SCNC: 105 MMOL/L (ref 96–108)
CO2 SERPL-SCNC: 31 MMOL/L (ref 21–32)
CREAT SERPL-MCNC: 0.55 MG/DL (ref 0.6–1.3)
GFR SERPL CREATININE-BSD FRML MDRD: 90 ML/MIN/1.73SQ M
GLUCOSE SERPL-MCNC: 119 MG/DL (ref 65–140)
GLUCOSE SERPL-MCNC: 158 MG/DL (ref 65–140)
GLUCOSE SERPL-MCNC: 167 MG/DL (ref 65–140)
GLUCOSE SERPL-MCNC: 195 MG/DL (ref 65–140)
GLUCOSE SERPL-MCNC: 211 MG/DL (ref 65–140)
GRAM STN SPEC: ABNORMAL
POTASSIUM SERPL-SCNC: 3.9 MMOL/L (ref 3.5–5.3)
SODIUM SERPL-SCNC: 140 MMOL/L (ref 135–147)

## 2024-02-18 PROCEDURE — 99232 SBSQ HOSP IP/OBS MODERATE 35: CPT | Performed by: NURSE PRACTITIONER

## 2024-02-18 PROCEDURE — 80048 BASIC METABOLIC PNL TOTAL CA: CPT | Performed by: INTERNAL MEDICINE

## 2024-02-18 PROCEDURE — 99232 SBSQ HOSP IP/OBS MODERATE 35: CPT | Performed by: INTERNAL MEDICINE

## 2024-02-18 PROCEDURE — 99024 POSTOP FOLLOW-UP VISIT: CPT | Performed by: SURGERY

## 2024-02-18 PROCEDURE — 82948 REAGENT STRIP/BLOOD GLUCOSE: CPT

## 2024-02-18 RX ORDER — CYANOCOBALAMIN 1000 UG/ML
1000 INJECTION, SOLUTION INTRAMUSCULAR; SUBCUTANEOUS DAILY
Status: DISPENSED | OUTPATIENT
Start: 2024-02-19 | End: 2024-02-21

## 2024-02-18 RX ORDER — CHLORHEXIDINE GLUCONATE ORAL RINSE 1.2 MG/ML
15 SOLUTION DENTAL ONCE
Status: DISCONTINUED | OUTPATIENT
Start: 2024-02-18 | End: 2024-02-19 | Stop reason: HOSPADM

## 2024-02-18 RX ADMIN — RIVAROXABAN 2.5 MG: 2.5 TABLET, FILM COATED ORAL at 08:32

## 2024-02-18 RX ADMIN — DOCUSATE SODIUM 100 MG: 100 CAPSULE, LIQUID FILLED ORAL at 08:29

## 2024-02-18 RX ADMIN — PREGABALIN 200 MG: 100 CAPSULE ORAL at 17:33

## 2024-02-18 RX ADMIN — INSULIN LISPRO 2 UNITS: 100 INJECTION, SOLUTION INTRAVENOUS; SUBCUTANEOUS at 11:48

## 2024-02-18 RX ADMIN — CYANOCOBALAMIN TAB 500 MCG 1000 MCG: 500 TAB at 08:29

## 2024-02-18 RX ADMIN — PREGABALIN 200 MG: 100 CAPSULE ORAL at 08:29

## 2024-02-18 RX ADMIN — CEFEPIME 1000 MG: 1 INJECTION, POWDER, FOR SOLUTION INTRAMUSCULAR; INTRAVENOUS at 17:33

## 2024-02-18 RX ADMIN — ACETAMINOPHEN 975 MG: 325 TABLET, FILM COATED ORAL at 14:22

## 2024-02-18 RX ADMIN — PANTOPRAZOLE SODIUM 40 MG: 40 TABLET, DELAYED RELEASE ORAL at 08:29

## 2024-02-18 RX ADMIN — METHOCARBAMOL 250 MG: 500 TABLET ORAL at 14:22

## 2024-02-18 RX ADMIN — INSULIN LISPRO 1 UNITS: 100 INJECTION, SOLUTION INTRAVENOUS; SUBCUTANEOUS at 08:30

## 2024-02-18 RX ADMIN — ACETAMINOPHEN 975 MG: 325 TABLET, FILM COATED ORAL at 21:43

## 2024-02-18 RX ADMIN — CLONAZEPAM 1 MG: 1 TABLET ORAL at 17:33

## 2024-02-18 RX ADMIN — METHOCARBAMOL 250 MG: 500 TABLET ORAL at 06:29

## 2024-02-18 RX ADMIN — Medication 1 TABLET: at 08:29

## 2024-02-18 RX ADMIN — PREGABALIN 200 MG: 100 CAPSULE ORAL at 21:43

## 2024-02-18 RX ADMIN — ASPIRIN 81 MG: 81 TABLET, COATED ORAL at 08:29

## 2024-02-18 RX ADMIN — RIVAROXABAN 2.5 MG: 2.5 TABLET, FILM COATED ORAL at 21:43

## 2024-02-18 RX ADMIN — CEFEPIME 1000 MG: 1 INJECTION, POWDER, FOR SOLUTION INTRAMUSCULAR; INTRAVENOUS at 06:30

## 2024-02-18 RX ADMIN — OXYBUTYNIN CHLORIDE 5 MG: 5 TABLET, EXTENDED RELEASE ORAL at 08:29

## 2024-02-18 RX ADMIN — BUPROPION HYDROCHLORIDE 300 MG: 150 TABLET, EXTENDED RELEASE ORAL at 08:29

## 2024-02-18 RX ADMIN — FLUTICASONE FUROATE AND VILANTEROL TRIFENATATE 1 PUFF: 200; 25 POWDER RESPIRATORY (INHALATION) at 08:38

## 2024-02-18 RX ADMIN — POLYETHYLENE GLYCOL 3350 17 G: 17 POWDER, FOR SOLUTION ORAL at 08:30

## 2024-02-18 RX ADMIN — DOCUSATE SODIUM 100 MG: 100 CAPSULE, LIQUID FILLED ORAL at 17:33

## 2024-02-18 RX ADMIN — ACETAMINOPHEN 975 MG: 325 TABLET, FILM COATED ORAL at 06:29

## 2024-02-18 RX ADMIN — METHOCARBAMOL 250 MG: 500 TABLET ORAL at 21:43

## 2024-02-18 RX ADMIN — NICOTINE 1 PATCH: 14 PATCH, EXTENDED RELEASE TRANSDERMAL at 08:36

## 2024-02-18 RX ADMIN — ATORVASTATIN CALCIUM 10 MG: 10 TABLET, FILM COATED ORAL at 17:33

## 2024-02-18 RX ADMIN — OMEGA-3 FATTY ACIDS CAP 1000 MG 1000 MG: 1000 CAP at 08:29

## 2024-02-18 NOTE — ASSESSMENT & PLAN NOTE
B12 level 93  pending MMA   S/p b12 injection 2/17- give additional 2 doses to complete 3 doses total   Started PO supplements as well

## 2024-02-18 NOTE — ASSESSMENT & PLAN NOTE
Continue cefepime, vancomycin DC'd 2/16/2024  MRSA screen negative  Vascular surgery following status post washout with VAC placement on 2/14/2024  Per vascula plan for OR Monday for re-exploration of right groin, washout, vac change to ensure no graft exposure    Infectious disease following  Cultures + klebsiella and proteus, pending anaerobic cultures

## 2024-02-18 NOTE — PROGRESS NOTES
Progress Note - Vascular Surgery   Lona Cedeno 78 y.o. female [id number]  Unit/Bed#:[facility] 815-01 Encounter: [id number]      Assessment:    78 y.o. with PMH HTN, HLD, COPD, DM, Right Ankle ORIF presenting with AIOD, Right CLTI c exposed, necrotic heel wound.     Vascular surgery consulted for AIOD, Right CLTI c exposed necrotic heel wound.     1/31: Right Ax to Fem PTFE bypass  2/14 Rt Groin washout and vac  2/4 Rt Axillary I&D, Pods Achilles debridement, partial 2nd to amp, Hematoma evac.     Plan:  - Doing well post-op; viable, well perfused limb  - Cont VAC to Right groin; Change MWF  - OR Monday for re-exploration of right groin, washout, vac change to ensure no graft exposure   - OOB as tolerated  - Diet as tolerated; NPOpMN  - Abx  Cefepime  - 2/14 Bcx: NGTD  - 2/14 WCX: Klebseilla,  Proteus   - 2/13 Ucx: Enterobacter  - No evidence of arterial prosthetic graft infection on wound exploration 2/14.   - Right chest wall incision c/d/I, seroma improving   - VTEppx: SQH  - Cont Aspirin, Statin  - Can resume Xarelto 2.5mg BID  - Further recommendations pending clinical course     Subjective:    Pt s/e. No acute events overnight. Pt awake, alert.    Tolerating diet. Not OOB. Voiding.      Pain controlled. Admits to pain at surgical site.     No new complaints. Denies n/v, sob, chest pain, f/c. No sensory change, numbness, or weakness of lower extremities    I/Os:  I/O last 3 completed shifts:  In: 1668.8 [P.O.:860; I.V.:808.8]  Out: 4450 [Urine:4450]  No intake/output data recorded.    Review of Systems   All other systems reviewed and are negative.      Vitals:    02/18/24 0751   BP: 149/87   Pulse: 74   Resp:    Temp: 98.3 °F (36.8 °C)   SpO2: 91%        Physical Exam  Vitals and nursing note reviewed.   Constitutional:       General: She is not in acute distress.     Appearance: She is well-developed. She is not diaphoretic.   HENT:      Head: Normocephalic and atraumatic.   Eyes:      Conjunctiva/sclera:  Conjunctivae normal.   Cardiovascular:      Rate and Rhythm: Normal rate and regular rhythm.      Heart sounds: No murmur heard.     Comments: RUE Palp brachial, radial pulse     LLE non-palp fem  RLE palp  Pop  Right ax-fem dop signal  RLE Dop DP  Pulmonary:      Effort: Pulmonary effort is normal. No respiratory distress.      Breath sounds: Normal breath sounds.   Abdominal:      Palpations: Abdomen is soft.      Tenderness: There is no abdominal tenderness.   Musculoskeletal:         General: Signs of injury present. No swelling.      Cervical back: Neck supple.      Comments: Right heel VAC  Right groin VAC  Right chest wall incision soft, c/d/I     Skin:     General: Skin is warm and dry.      Capillary Refill: Capillary refill takes less than 2 seconds.   Neurological:      General: No focal deficit present.      Mental Status: She is alert and oriented to person, place, and time. Mental status is at baseline.   Psychiatric:         Mood and Affect: Mood normal.         Imaging:I have personally reviewed pertinent reports.      Lab Results and Cultures:   Lab Results   Component Value Date    WBC 8.41 02/17/2024    HGB 9.8 (L) 02/17/2024    HCT 31.8 (L) 02/17/2024     (H) 02/17/2024     02/17/2024     Lab Results   Component Value Date    GLUCOSE 143 (H) 01/31/2024    CALCIUM 9.2 02/17/2024     05/21/2018    K 3.8 02/17/2024    CO2 28 02/17/2024     02/17/2024    BUN 10 02/17/2024    CREATININE 0.47 (L) 02/17/2024     Lab Results   Component Value Date    INR 1.16 02/14/2024    INR 1.03 02/04/2024    INR 1.11 02/03/2024    PROTIME 14.7 (H) 02/14/2024    PROTIME 13.4 02/04/2024    PROTIME 14.2 02/03/2024        Blood Culture:   Lab Results   Component Value Date    BLOODCX No Growth After 4 Days. 02/13/2024    BLOODCX No Growth After 4 Days. 02/13/2024   ,   Urinalysis:   Lab Results   Component Value Date    COLORU Light Yellow 02/14/2024    COLORU YELLOW 05/21/2018    CLARITYU Clear  02/14/2024    CLARITYU CLEAR 05/21/2018    SPECGRAV 1.009 02/14/2024    SPECGRAV 1.015 05/21/2018    PHUR 5.5 02/14/2024    PHUR 6.0 05/21/2018    LEUKOCYTESUR Large (A) 02/14/2024    LEUKOCYTESUR NEGATIVE 05/21/2018    NITRITE Negative 02/14/2024    NITRITE NEGATIVE 05/21/2018    PROTEINUA NEGATIVE 05/21/2018    GLUCOSEU Negative 02/14/2024    GLUCOSEU NEGATIVE 05/21/2018    KETONESU Negative 02/14/2024    KETONESU NEGATIVE 05/21/2018    BILIRUBINUR Negative 02/14/2024    BILIRUBINUR NEGATIVE 05/21/2018    BLOODU Negative 02/14/2024    BLOODU NEGATIVE 05/21/2018   ,   Urine Culture:   Lab Results   Component Value Date    URINECX >100,000 cfu/ml Enterobacter cloacae (A) 02/13/2024   ,   Wound Culure:   Lab Results   Component Value Date    WOUNDCULT 1+ Growth of Klebsiella pneumoniae (A) 02/14/2024    WOUNDCULT 1+ Growth of Proteus vulgaris group (A) 02/14/2024    WOUNDCULT Few Colonies of 02/14/2024       Medications:  Current Facility-Administered Medications   Medication Dose Route Frequency    acetaminophen (TYLENOL) tablet 975 mg  975 mg Oral Q8H JILLIAN    albuterol (PROVENTIL HFA,VENTOLIN HFA) inhaler 2 puff  2 puff Inhalation Q6H PRN    aspirin (ECOTRIN LOW STRENGTH) EC tablet 81 mg  81 mg Oral Daily    atorvastatin (LIPITOR) tablet 10 mg  10 mg Oral Daily With Dinner    bisacodyl (DULCOLAX) rectal suppository 10 mg  10 mg Rectal Daily PRN    buPROPion (WELLBUTRIN XL) 24 hr tablet 300 mg  300 mg Oral Daily    cefepime (MAXIPIME) 1,000 mg in dextrose 5 % 50 mL IVPB  1,000 mg Intravenous Q12H    clonazePAM (KlonoPIN) tablet 1 mg  1 mg Oral QPM    cyanocobalamin (VITAMIN B-12) tablet 1,000 mcg  1,000 mcg Oral Daily    cyanocobalamin injection 1,000 mcg  1,000 mcg Intramuscular Q30 Days    docusate sodium (COLACE) capsule 100 mg  100 mg Oral BID    fish oil capsule 1,000 mg  1,000 mg Oral Daily    fluticasone-vilanterol 200-25 mcg/actuation 1 puff  1 puff Inhalation Daily    insulin lispro (HumaLOG) 100 units/mL  subcutaneous injection 1-5 Units  1-5 Units Subcutaneous TID With Meals    methocarbamol (ROBAXIN) tablet 250 mg  250 mg Oral Q8H JILLIAN    multivitamin-minerals (CENTRUM) tablet 1 tablet  1 tablet Oral Daily    nicotine (NICODERM CQ) 14 mg/24hr TD 24 hr patch 1 patch  1 patch Transdermal Daily    ondansetron (ZOFRAN) injection 4 mg  4 mg Intravenous Q6H PRN    oxybutynin (DITROPAN-XL) 24 hr tablet 5 mg  5 mg Oral Daily    oxyCODONE (ROXICODONE) split tablet 2.5 mg  2.5 mg Oral Q4H PRN    Or    oxyCODONE (ROXICODONE) IR tablet 5 mg  5 mg Oral Q4H PRN    pantoprazole (PROTONIX) EC tablet 40 mg  40 mg Oral Early Morning    polyethylene glycol (MIRALAX) packet 17 g  17 g Oral Daily    pregabalin (LYRICA) capsule 200 mg  200 mg Oral TID    rivaroxaban (XARELTO) tablet 2.5 mg  2.5 mg Oral BID With Meals    senna (SENOKOT) tablet 17.2 mg  2 tablet Oral Daily PRN       Patient Active Problem List   Diagnosis    Type 2 diabetes mellitus with diabetic polyneuropathy (HCC)    Essential hypertension    Anxiety and depression    Medicare annual wellness visit, subsequent    GERD without esophagitis    Urinary incontinence    Degenerative lumbar disc    Lactic acidosis    Hypomagnesemia    Bladder neoplasm    Left renal mass    PAD (peripheral artery disease) (HCC)    Abnormal CT of the chest    COPD, severity to be determined ([facility])    Tobacco use disorder    Age-related osteoporosis without current pathological fracture    Ankle ulcer, right, with fat layer exposed ([facility])    Diabetic foot ulcer with osteomyelitis ([facility])    Preoperative cardiovascular examination    Constipation    Hematoma    At risk for venous thromboembolism (VTE)    At high risk for skin breakdown    Wound of skin    Impaired mobility and activities of daily living    Acute pain due to injury    Leukocytosis    Surgical site infection    Sepsis without acute organ dysfunction (HCC)    Other dietary vitamin B12 deficiency anemia       Past Surgical History:    Procedure Laterality Date    ANKLE FRACTURE SURGERY Right     has screw in place     SECTION      x2    CHOLECYSTECTOMY      CYSTOSCOPY W/ LASER LITHOTRIPSY Left 2023    Procedure: CYSTOSCOPY; RETROGRADE; URETEROSCOPY; BIOPSY; LEFT -INSERTION OF STENT;  Surgeon: Cristian Child MD;  Location: [facility];  Service: Urology    CYSTOSCOPY W/ LASER LITHOTRIPSY Left 2023    Procedure: CYSTOSCOPY; RETROGRADE; URETEROSCOPY; 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;  Surgeon: Cristian Child MD;  Location: [facility];  Service: Urology    EVACUATION OF HEMATOMA Right 2024    Procedure: EVACUATION/ DRAINAGE CHEST WALL  HEMATOMA;  Surgeon: Seth Hoyos MD;  Location: [facility];  Service: Vascular    FL RETROGRADE PYELOGRAM  2023    HERNIA REPAIR      umbilicus w/ mesh    KY BYPASS W/VEIN AXILLARY-FEMORAL Right 2024    Procedure: BYPASS AXILLO-FEMORAL, RIGHT WITH 8MM RINGED PTFE GRAFT;  Surgeon: Seth Hoyos MD;  Location: [facility];  Service: Vascular    WOUND DEBRIDEMENT Right 2024    Procedure: RIGHT WOUND AND ACHILLES DEBRIDEMENT FOOT/TOE (WASH OUT); PARTIAL AMPUTATION DISTAL 2ND TOE;  Surgeon: Aaron Montero DPM;  Location: [facility];  Service: Podiatry    WOUND DEBRIDEMENT Right 2024    Procedure: DEBRIDEMENT RIGHT GROIN  WOUND AND WASHOUT, APPLICATION OF WOUND VAC;  Surgeon: Suly Muro DO;  Location: [facility];  Service: Vascular       Family History   Problem Relation Age of Onset    COPD Mother     Emphysema Mother     COPD Father     Emphysema Father        Social History     Socioeconomic History    Marital status: /Civil Union     Spouse name: Not on file    Number of children: Not on file    Years of education: Not on file    Highest education level: Not on file   Occupational History    Not on file   Tobacco Use    Smoking status: Every Day     Current packs/day: 1.00     Average packs/day: 1 pack/day for 63.1  years (63.1 ttl pk-yrs)     Types: Cigarettes     Start date: 1961    Smokeless tobacco: Never    Tobacco comments:     11/14/23 smokes about 3/4 PPD   Vaping Use    Vaping status: Never Used   Substance and Sexual Activity    Alcohol use: Not Currently    Drug use: Never    Sexual activity: Not Currently   Other Topics Concern    Not on file   Social History Narrative    Not on file     Social Determinants of Health     Financial Resource Strain: Low Risk  (2/9/2023)    Overall Financial Resource Strain (CARDIA)     Difficulty of Paying Living Expenses: Not hard at all   Food Insecurity: No Food Insecurity (1/24/2024)    Hunger Vital Sign     Worried About Running Out of Food in the Last Year: Never true     Ran Out of Food in the Last Year: Never true   Transportation Needs: No Transportation Needs (1/24/2024)    PRAPARE - Transportation     Lack of Transportation (Medical): No     Lack of Transportation (Non-Medical): No   Physical Activity: Not on file   Stress: Not on file   Social Connections: Not on file   Intimate Partner Violence: Not on file   Housing Stability: Low Risk  (1/24/2024)    Housing Stability Vital Sign     Unable to Pay for Housing in the Last Year: No     Number of Places Lived in the Last Year: 1     Unstable Housing in the Last Year: No       Allergies   Allergen Reactions    Paroxetine Other (See Comments)     Became suicidal    Adhesive [Medical Tape] Itching and Blisters    Carafate [Sucralfate] Other (See Comments)     Mouth sores, tongue sore    Sulfa Antibiotics Hives and Rash    Sulfamethoxazole-Trimethoprim Hives

## 2024-02-18 NOTE — PLAN OF CARE
Problem: Prexisting or High Potential for Compromised Skin Integrity  Goal: Skin integrity is maintained or improved  Description: INTERVENTIONS:  - Identify patients at risk for skin breakdown  - Assess and monitor skin integrity  - Assess and monitor nutrition and hydration status  - Monitor labs   - Assess for incontinence   - Turn and reposition patient  - Assist with mobility/ambulation  - Relieve pressure over bony prominences  - Avoid friction and shearing  - Provide appropriate hygiene as needed including keeping skin clean and dry  - Evaluate need for skin moisturizer/barrier cream  - Collaborate with interdisciplinary team   - Patient/family teaching  - Consider wound care consult   Outcome: Progressing     Problem: SKIN/TISSUE INTEGRITY - ADULT  Goal: Incision(s), wounds(s) or drain site(s) healing without S/S of infection  Description: INTERVENTIONS  - Assess and document dressing, incision, wound bed, drain sites and surrounding tissue  - Provide patient and family education  - Perform skin care/dressing changes every shift  Outcome: Progressing     Problem: INFECTION - ADULT  Goal: Absence or prevention of progression during hospitalization  Description: INTERVENTIONS:  - Assess and monitor for signs and symptoms of infection  - Monitor lab/diagnostic results  - Monitor all insertion sites, i.e. indwelling lines, tubes, and drains  - Monitor endotracheal if appropriate and nasal secretions for changes in amount and color  - [place] appropriate cooling/warming therapies per order  - Administer medications as ordered  - Instruct and encourage patient and family to use good hand hygiene technique  - Identify and instruct in appropriate isolation precautions for identified infection/condition  Outcome: Progressing

## 2024-02-18 NOTE — PLAN OF CARE
Problem: Prexisting or High Potential for Compromised Skin Integrity  Goal: Skin integrity is maintained or improved  Description: INTERVENTIONS:  - Identify patients at risk for skin breakdown  - Assess and monitor skin integrity  - Assess and monitor nutrition and hydration status  - Monitor labs   - Assess for incontinence   - Turn and reposition patient  - Assist with mobility/ambulation  - Relieve pressure over bony prominences  - Avoid friction and shearing  - Provide appropriate hygiene as needed including keeping skin clean and dry  - Evaluate need for skin moisturizer/barrier cream  - Collaborate with interdisciplinary team   - Patient/family teaching  - Consider wound care consult   Outcome: Progressing     Problem: SKIN/TISSUE INTEGRITY - ADULT  Goal: Incision(s), wounds(s) or drain site(s) healing without S/S of infection  Description: INTERVENTIONS  - Assess and document dressing, incision, wound bed, drain sites and surrounding tissue  - Provide patient and family education  - Perform skin care/dressing changes every shift  Outcome: Progressing     Problem: INFECTION - ADULT  Goal: Absence or prevention of progression during hospitalization  Description: INTERVENTIONS:  - Assess and monitor for signs and symptoms of infection  - Monitor lab/diagnostic results  - Monitor all insertion sites, i.e. indwelling lines, tubes, and drains  - Monitor endotracheal if appropriate and nasal secretions for changes in amount and color  - [place] appropriate cooling/warming therapies per order  - Administer medications as ordered  - Instruct and encourage patient and family to use good hand hygiene technique  - Identify and instruct in appropriate isolation precautions for identified infection/condition  Outcome: Progressing

## 2024-02-18 NOTE — ASSESSMENT & PLAN NOTE
Fever/leukocytosis at [facility] prior to arrival here, additionally with lactic acidosis resolved following IV fluids  Concern for surgical site infection at groin, s/p waschout with vac placement on 2/14 with vascular surgery   Received cefepime/vancomycin at [facility] prior to transfer to medical floor-currently on cefepime, vancomycin DC'd 2/16/2024  ID consult appreciated  Blood cultures NGTD  Covid/flu/rsv negative   Wound culture 2/14- (+) Klebsiella and Proteus growing  Anaerobic culture 2/14/2024-pending  Urine culture-prelim> 100,000 enterobacter cloacae noted to be resistant to cefepime- likely secondary to colonization/asymptomatic bacteruria

## 2024-02-18 NOTE — ASSESSMENT & PLAN NOTE
Lab Results   Component Value Date    HGBA1C 6.5 (H) 11/07/2023       Recent Labs     02/17/24  1600 02/17/24  2110 02/18/24  0704 02/18/24  1102   POCGLU 86 161* 167* 211*       Blood Sugar Average: Last 72 hrs:  (P) [id number]  hold metformin while inpatient  Diabetic diet  Fingersticks QID with sliding scale coverage

## 2024-02-18 NOTE — PROGRESS NOTES
[facility]  Progress Note  Name: Lona Cedeno I  MRN: [id number]  Unit/Bed#: [facility] 815-01 I Date of Admission: 2/13/2024   Date of Service: 2/18/2024 I Hospital Day: 5    Assessment/Plan   * Surgical site infection  Assessment & Plan  Continue cefepime, vancomycin DC'd 2/16/2024  MRSA screen negative  Vascular surgery following status post washout with VAC placement on 2/14/2024  Per vascula plan for OR Monday for re-exploration of right groin, washout, vac change to ensure no graft exposure    Infectious disease following  Cultures + klebsiella and proteus, pending anaerobic cultures    Sepsis without acute organ dysfunction (HCC)  Assessment & Plan  Fever/leukocytosis at [facility] prior to arrival here, additionally with lactic acidosis resolved following IV fluids  Concern for surgical site infection at groin, s/p waschout with vac placement on 2/14 with vascular surgery   Received cefepime/vancomycin at [facility] prior to transfer to medical floor-currently on cefepime, vancomycin DC'd 2/16/2024  ID consult appreciated  Blood cultures NGTD  Covid/flu/rsv negative   Wound culture 2/14- (+) Klebsiella and Proteus growing  Anaerobic culture 2/14/2024-pending  Urine culture-prelim> 100,000 enterobacter cloacae noted to be resistant to cefepime- likely secondary to colonization/asymptomatic bacteruria       Other dietary vitamin B12 deficiency anemia  Assessment & Plan  B12 level 93  pending MMA   S/p b12 injection 2/17- give additional 2 doses to complete 3 doses total   Started PO supplements as well     Constipation  Assessment & Plan  Continue with bowel regimen- S/P BM 2/17    Diabetic foot ulcer with osteomyelitis (HCC)  Assessment & Plan  Podiatry assistance appreciated  Wound vac MWF       Tobacco use disorder  Assessment & Plan  Continue with nicotine patch  Encourage cessation    COPD, severity to be determined (HCC)  Assessment & Plan  Respiratory status is stable  Continue  maintenance inhalers and PRN bronchodilators    PAD (peripheral artery disease) ([facility])  Assessment & Plan  1/31 BYPASS AXILLO-FEMORAL, RIGHT WITH 8MM RINGED PTFE GRAFT   Continue aspirin, statin, xarelto 2.5 mg bid     Essential hypertension  Assessment & Plan  Hypotension noted post op- now resolved   C/w holding amiloride, can restart later is indicated    Type 2 diabetes mellitus with diabetic polyneuropathy ([facility])  Assessment & Plan  Lab Results   Component Value Date    HGBA1C 6.5 (H) 11/07/2023       Recent Labs     02/17/24  1600 02/17/24  2110 02/18/24  0704 02/18/24  1102   POCGLU 86 161* 167* 211*       Blood Sugar Average: Last 72 hrs:  (P) [id number]  hold metformin while inpatient  Diabetic diet  Fingersticks QID with sliding scale coverage             VTE Pharmacologic Prophylaxis: VTE Score: 3 Moderate Risk (Score 3-4) - Pharmacological DVT Prophylaxis Ordered: rivaroxaban (Xarelto).    Mobility:   Basic Mobility Inpatient Raw Score: 14  -HLM Goal: 4: Move to chair/commode  -HLM Achieved: 3: Sit at edge of bed  HLM Goal NOT achieved. Continue with multidisciplinary rounding and encourage appropriate mobility to improve upon HLM goals.    Patient Centered Rounds: I performed bedside rounds with nursing staff today.   Discussions with Specialists or Other Care Team Provider: d/w RN     Education and Discussions with Family / Patient: Patient declined call to .     Total Time Spent on Date of Encounter in care of patient: 35 mins. This time was spent on one or more of the following: performing physical exam; counseling and coordination of care; obtaining or reviewing history; documenting in the medical record; reviewing/ordering tests, medications or procedures; communicating with other healthcare professionals and discussing with patient's family/caregivers.    Current Length of Stay: 5 day(s)  Current Patient Status: Inpatient   Certification Statement: The patient will  continue to require additional inpatient hospital stay due to iv abxs plan for OR in am   Discharge Plan: Anticipate discharge in 48-72 hrs to rehab facility.    Code Status: Level 3 - DNAR and DNI    Subjective:   pt lting in bed. Reports she really wants to go home but is grateful for being here and saving her leg. She reports having a BM yesterday. Denies any other complaints     Objective:     Vitals:   Temp (24hrs), Av.7 °F (37.1 °C), Min:98.3 °F (36.8 °C), Max:99.3 °F (37.4 °C)    Temp:  [98.3 °F (36.8 °C)-99.3 °F (37.4 °C)] 98.3 °F (36.8 °C)  HR:  [73-75] 74  Resp:  [16] 16  BP: (139-149)/(55-87) 149/87  SpO2:  [89 %-95 %] 91 %  Body mass index is 28.7 kg/m².     Input and Output Summary (last 24 hours):     Intake/Output Summary (Last 24 hours) at 2024 1434  Last data filed at 2024 1100  Gross per 24 hour   Intake 180 ml   Output 2450 ml   Net -2270 ml       Physical Exam:   Physical Exam  Vitals and nursing note reviewed.   Constitutional:       General: She is not in acute distress.     Appearance: She is well-developed.   Cardiovascular:      Rate and Rhythm: Normal rate and regular rhythm.      Heart sounds: Normal heart sounds. No murmur heard.  Pulmonary:      Effort: Pulmonary effort is normal. No respiratory distress.      Breath sounds: Normal breath sounds. No wheezing or rales.   Abdominal:      General: Bowel sounds are normal. There is distension.      Palpations: Abdomen is soft.      Tenderness: There is no abdominal tenderness.   Musculoskeletal:         General: No swelling.   Skin:     General: Skin is warm and dry.      Comments: Vac right groin, vac right foot    Neurological:      Mental Status: She is alert. Mental status is at baseline.   Psychiatric:         Mood and Affect: Mood normal.          Additional Data:     Labs:  Results from last 7 days   Lab Units 24  0612 24  0509 02/15/24  0602   WBC Thousand/uL 8.41   < > 12.41*   HEMOGLOBIN g/dL 9.8*   < >  9.8*   HEMATOCRIT % 31.8*   < > 31.8*   PLATELETS Thousands/uL 358   < > 347   NEUTROS PCT %  --   --  87*   LYMPHS PCT %  --   --  9*   MONOS PCT %  --   --  3*   EOS PCT %  --   --  0    < > = values in this interval not displayed.     Results from last 7 days   Lab Units 02/18/24  0645   SODIUM mmol/L 140   POTASSIUM mmol/L 3.9   CHLORIDE mmol/L 105   CO2 mmol/L 31   BUN mg/dL 10   CREATININE mg/dL 0.55*   ANION GAP mmol/L 4   CALCIUM mg/dL 9.6   GLUCOSE RANDOM mg/dL 158*     Results from last 7 days   Lab Units 02/14/24  0446   INR  1.16     Results from last 7 days   Lab Units 02/18/24  1102 02/18/24  0704 02/17/24  2110 02/17/24  1600 02/17/24  1118 02/17/24  0718 02/16/24  2059 02/16/24  1617 02/16/24  1117 02/16/24  0720 02/15/24  2103 02/15/24  1606   POC GLUCOSE mg/dl 211* 167* 161* 86 175* 161* 164* 132 196* 168* 254* 110         Results from last 7 days   Lab Units 02/15/24  0602 02/14/24  0446 02/13/24  2129 02/13/24  1750 02/13/24  1427   LACTIC ACID mmol/L  --   --  1.7 2.3*  --    PROCALCITONIN ng/ml 0.39* 0.52*  --   --  0.06       Lines/Drains:  Invasive Devices       Peripheral Intravenous Line  Duration             Peripheral IV 02/16/24 Right Forearm 1 day              Drain  Duration             Ureteral Internal Stent Left ureter 6 Fr. 90 days                          Imaging: No pertinent imaging reviewed.    Recent Cultures (last 7 days):   Results from last 7 days   Lab Units 02/14/24  1539 02/14/24  1534 02/13/24  1920 02/13/24  1750   BLOOD CULTURE   --   --   --  No Growth After 4 Days.  No Growth After 4 Days.   GRAM STAIN RESULT  1+ Disintegrating polys*  1+ Gram positive rods* No Polys or Bacteria seen  --   --    URINE CULTURE   --   --  >100,000 cfu/ml Enterobacter cloacae*  --    WOUND CULTURE  1+ Growth of Klebsiella pneumoniae*  1+ Growth of Proteus vulgaris group*  Few Colonies of  --   --   --        Last 24 Hours Medication List:   Current Facility-Administered Medications    Medication Dose Route Frequency Provider Last Rate    acetaminophen  975 mg Oral Q8H [facility] Himanshu Overton MD      albuterol  2 puff Inhalation Q6H PRN Himanshu Overton MD      aspirin  81 mg Oral Daily Himanshu Overton MD      atorvastatin  10 mg Oral Daily With Dinner Himanshu Overton MD      bisacodyl  10 mg Rectal Daily PRN ROXANE Munoz      buPROPion  300 mg Oral Daily Himanshu Overton MD      cefepime  1,000 mg Intravenous Q12H Himanshu Overton MD 1,000 mg (02/18/24 0630)    clonazePAM  1 mg Oral QPM Himanshu Overton MD      vitamin B-12  1,000 mcg Oral Daily ROXANE Munoz      [START ON 2/19/2024] cyanocobalamin  1,000 mcg Intramuscular Daily ROXANE Munoz      docusate sodium  100 mg Oral BID Himanshu Overton MD      fish oil  1,000 mg Oral Daily Himanshu Overton MD      fluticasone-vilanterol  1 puff Inhalation Daily Himanshu Overton MD      insulin lispro  1-5 Units Subcutaneous TID With Meals Amrita Paul MD      methocarbamol  250 mg Oral Q8H [facility] Himanshu Overton MD      multivitamin-minerals  1 tablet Oral Daily ROXANE Munoz      nicotine  1 patch Transdermal Daily Himanshu Overton MD      ondansetron  4 mg Intravenous Q6H PRN Himanshu Overton MD      oxybutynin  5 mg Oral Daily Himanshu Overton MD      oxyCODONE  2.5 mg Oral Q4H PRN Himanshu Overton MD      Or    oxyCODONE  5 mg Oral Q4H PRN Himanshu Overton MD      pantoprazole  40 mg Oral Early Morning Himanshu Overton MD      polyethylene glycol  17 g Oral Daily ROXANE Munoz      pregabalin  200 mg Oral TID Himanshu Overton MD      rivaroxaban  2.5 mg Oral BID With Meals Felipa Foy PA-C      senna  2 tablet Oral Daily PRN Enzo Elam DO          Today, Patient Was Seen By: ROXANE Munoz    **Please Note: This note may have been constructed using a voice recognition system.**

## 2024-02-18 NOTE — PROGRESS NOTES
Progress Note - Infectious Disease   Lona Cedeno 78 y.o. female MRN: [id number]  Unit/Bed#: [facility] 815-01 Encounter: [id number]      Impression:  1.  Postoperative right groin wound infection with secondary sepsis and with presumptive involvement of recent axillofemoral graft s/p debridement, washout and VAC placement POD 3  2.  Type II DM with PAD s/p remote right ankle fracture with ORIF  and right axillary to femoral bypass 2024 with Achilles debridement and partial second toe amputation with hematoma evacuation  3.  COPD  4.  GERD    Recommendations:  Patient is afebrile with normal WBC count of 8,410 and hemoglobin of 9.8.  Wound picture by vascular surgery  noted  1.  So far wound cultures are showing 1+ Klebsiella pneumoniae a and Proteus vulgaris group with susceptibilities pending.  MRSA nares culture is negative  2.  Continue cefepime 1 g every 12 hours IV pending above.  Hopefully will be able to have a reasonable p.o. regimen for long-term therapy to cover possibility of graft infection once discharged  3.  She is scheduled for a repeat wound washout on     Antibiotics:  1.  Cefepime 1 g every 12 hours IV, day 5 Rx    Subjective:  The patient has no complaints.  Denies fevers, chills, or sweats.  Denies nausea, vomiting, or diarrhea.      Objective:  Vitals:  Temp:  [98.6 °F (37 °C)-99.2 °F (37.3 °C)] 98.9 °F (37.2 °C)  HR:  [70-75] 75  Resp:  [16] 16  BP: (105-148)/(57-65) 148/57  SpO2:  [92 %-97 %] 94 %  Temp (24hrs), Av.9 °F (37.2 °C), Min:98.6 °F (37 °C), Max:99.2 °F (37.3 °C)  Current: Temperature: 98.9 °F (37.2 °C)    Physical Exam:     General Appearance:  Eyes: Alert, responsive nontoxic, no acute distress.  Conjunctiva pale   Throat: Oropharynx moist without lesions.  Lips, mucosa, and tongue normal   Neck: Supple, symmetrical, trachea midline, no adenopathy,  no tenderness/mass/nodules   Lungs:   Clear to auscultation bilaterally, no audible wheezes, rhonchi or rales;  respirations unlabored   Heart:  Regular rate and rhythm, S1, S2 normal, no murmur, rub or gallop   Abdomen:   Soft, non-tender, non-distended, positive bowel sounds.  No masses, no organomegaly    No CVA tenderness   Extremities: Right groin wound with VAC in place.  Vascular surgery picture shows 2/16 wound to have granulation tissue without overt purulence.  Right foot and ankle with dry surgical dressing s/p distal second toe amputation   Skin: Skin color pale, as above.         Invasive Devices       Peripheral Intravenous Line  Duration             Peripheral IV 02/16/24 Right Forearm 1 day              Drain  Duration             Ureteral Internal Stent Left ureter 6 Fr. 89 days                    Labs, Imaging, & Other studies:   All pertinent labs were personally reviewed  Results from last 7 days   Lab Units 02/17/24  0612 02/16/24  0509 02/15/24  0602   WBC Thousand/uL 8.41 10.95* 12.41*   HEMOGLOBIN g/dL 9.8* 9.6* 9.8*   PLATELETS Thousands/uL 358 354 347     Results from last 7 days   Lab Units 02/17/24  0612 02/16/24  0509 02/15/24  0602   SODIUM mmol/L 140 137 136   POTASSIUM mmol/L 3.8 4.1 4.3   CHLORIDE mmol/L 108 105 105   CO2 mmol/L 28 26 27   BUN mg/dL 10 12 15   CREATININE mg/dL 0.47* 0.56* 0.41*   EGFR ml/min/1.73sq m 95 90 99   CALCIUM mg/dL 9.2 8.7 9.2     Results from last 7 days   Lab Units 02/14/24  1539 02/14/24  1534 02/14/24  1153 02/13/24  1920 02/13/24  1750   BLOOD CULTURE   --   --   --   --  No Growth at 72 hrs.  No Growth at 72 hrs.   GRAM STAIN RESULT  1+ Disintegrating polys*  1+ Gram positive rods* No Polys or Bacteria seen  --   --   --    URINE CULTURE   --   --   --  >100,000 cfu/ml Enterobacter cloacae*  --    WOUND CULTURE  1+ Growth of Klebsiella pneumoniae*  1+ Growth of Proteus vulgaris group*  Few Colonies of  --   --   --   --    MRSA CULTURE ONLY   --   --  No Methicillin Resistant Staphlyococcus aureus (MRSA) isolated  --   --

## 2024-02-19 ENCOUNTER — ANESTHESIA (INPATIENT)
Dept: PERIOP | Facility: HOSPITAL | Age: 79
DRG: 856 | End: 2024-02-19
Payer: COMMERCIAL

## 2024-02-19 ENCOUNTER — ANESTHESIA EVENT (INPATIENT)
Dept: PERIOP | Facility: HOSPITAL | Age: 79
DRG: 856 | End: 2024-02-19
Payer: COMMERCIAL

## 2024-02-19 LAB
ANION GAP SERPL CALCULATED.3IONS-SCNC: 7 MMOL/L
BUN SERPL-MCNC: 9 MG/DL (ref 5–25)
CALCIUM SERPL-MCNC: 9.8 MG/DL (ref 8.4–10.2)
CHLORIDE SERPL-SCNC: 104 MMOL/L (ref 96–108)
CO2 SERPL-SCNC: 32 MMOL/L (ref 21–32)
CREAT SERPL-MCNC: 0.53 MG/DL (ref 0.6–1.3)
ERYTHROCYTE [DISTWIDTH] IN BLOOD BY AUTOMATED COUNT: 14.9 % (ref 11.6–15.1)
GFR SERPL CREATININE-BSD FRML MDRD: 91 ML/MIN/1.73SQ M
GLUCOSE SERPL-MCNC: 131 MG/DL (ref 65–140)
GLUCOSE SERPL-MCNC: 137 MG/DL (ref 65–140)
GLUCOSE SERPL-MCNC: 149 MG/DL (ref 65–140)
GLUCOSE SERPL-MCNC: 152 MG/DL (ref 65–140)
GLUCOSE SERPL-MCNC: 172 MG/DL (ref 65–140)
GLUCOSE SERPL-MCNC: 232 MG/DL (ref 65–140)
HCT VFR BLD AUTO: 35.3 % (ref 34.8–46.1)
HGB BLD-MCNC: 10.9 G/DL (ref 11.5–15.4)
MCH RBC QN AUTO: 30.9 PG (ref 26.8–34.3)
MCHC RBC AUTO-ENTMCNC: 30.9 G/DL (ref 31.4–37.4)
MCV RBC AUTO: 100 FL (ref 82–98)
PLATELET # BLD AUTO: 372 THOUSANDS/UL (ref 149–390)
PMV BLD AUTO: 8.9 FL (ref 8.9–12.7)
POTASSIUM SERPL-SCNC: 4 MMOL/L (ref 3.5–5.3)
RBC # BLD AUTO: 3.53 MILLION/UL (ref 3.81–5.12)
SODIUM SERPL-SCNC: 143 MMOL/L (ref 135–147)
WBC # BLD AUTO: 8.55 THOUSAND/UL (ref 4.31–10.16)

## 2024-02-19 PROCEDURE — 82948 REAGENT STRIP/BLOOD GLUCOSE: CPT

## 2024-02-19 PROCEDURE — 80048 BASIC METABOLIC PNL TOTAL CA: CPT | Performed by: NURSE PRACTITIONER

## 2024-02-19 PROCEDURE — 0JQC0ZZ REPAIR PELVIC REGION SUBCUTANEOUS TISSUE AND FASCIA, OPEN APPROACH: ICD-10-PCS | Performed by: SURGERY

## 2024-02-19 PROCEDURE — 99232 SBSQ HOSP IP/OBS MODERATE 35: CPT | Performed by: INTERNAL MEDICINE

## 2024-02-19 PROCEDURE — 12021 TX SUPFC WND DEHSN W/PACKING: CPT | Performed by: SURGERY

## 2024-02-19 PROCEDURE — 99024 POSTOP FOLLOW-UP VISIT: CPT | Performed by: PHYSICIAN ASSISTANT

## 2024-02-19 PROCEDURE — 85027 COMPLETE CBC AUTOMATED: CPT | Performed by: NURSE PRACTITIONER

## 2024-02-19 PROCEDURE — 97605 NEG PRS WND THER DME<=50SQCM: CPT | Performed by: PODIATRIST

## 2024-02-19 RX ORDER — PROPOFOL 10 MG/ML
INJECTION, EMULSION INTRAVENOUS AS NEEDED
Status: DISCONTINUED | OUTPATIENT
Start: 2024-02-19 | End: 2024-02-19

## 2024-02-19 RX ORDER — LIDOCAINE HYDROCHLORIDE 20 MG/ML
INJECTION, SOLUTION EPIDURAL; INFILTRATION; INTRACAUDAL; PERINEURAL AS NEEDED
Status: DISCONTINUED | OUTPATIENT
Start: 2024-02-19 | End: 2024-02-19

## 2024-02-19 RX ORDER — GLYCOPYRROLATE 0.2 MG/ML
INJECTION INTRAMUSCULAR; INTRAVENOUS AS NEEDED
Status: DISCONTINUED | OUTPATIENT
Start: 2024-02-19 | End: 2024-02-19

## 2024-02-19 RX ORDER — SODIUM CHLORIDE, SODIUM LACTATE, POTASSIUM CHLORIDE, CALCIUM CHLORIDE 600; 310; 30; 20 MG/100ML; MG/100ML; MG/100ML; MG/100ML
100 INJECTION, SOLUTION INTRAVENOUS CONTINUOUS
Status: DISCONTINUED | OUTPATIENT
Start: 2024-02-19 | End: 2024-02-21

## 2024-02-19 RX ORDER — FENTANYL CITRATE 50 UG/ML
INJECTION, SOLUTION INTRAMUSCULAR; INTRAVENOUS AS NEEDED
Status: DISCONTINUED | OUTPATIENT
Start: 2024-02-19 | End: 2024-02-19

## 2024-02-19 RX ORDER — ONDANSETRON 2 MG/ML
4 INJECTION INTRAMUSCULAR; INTRAVENOUS EVERY 6 HOURS PRN
Status: DISCONTINUED | OUTPATIENT
Start: 2024-02-19 | End: 2024-02-19 | Stop reason: HOSPADM

## 2024-02-19 RX ORDER — SODIUM CHLORIDE, SODIUM LACTATE, POTASSIUM CHLORIDE, CALCIUM CHLORIDE 600; 310; 30; 20 MG/100ML; MG/100ML; MG/100ML; MG/100ML
INJECTION, SOLUTION INTRAVENOUS CONTINUOUS PRN
Status: DISCONTINUED | OUTPATIENT
Start: 2024-02-19 | End: 2024-02-19

## 2024-02-19 RX ORDER — PROPOFOL 10 MG/ML
INJECTION, EMULSION INTRAVENOUS CONTINUOUS PRN
Status: DISCONTINUED | OUTPATIENT
Start: 2024-02-19 | End: 2024-02-19

## 2024-02-19 RX ORDER — FENTANYL CITRATE/PF 50 MCG/ML
25 SYRINGE (ML) INJECTION
Status: DISCONTINUED | OUTPATIENT
Start: 2024-02-19 | End: 2024-02-19 | Stop reason: HOSPADM

## 2024-02-19 RX ORDER — HYDROMORPHONE HCL/PF 1 MG/ML
0.5 SYRINGE (ML) INJECTION
Status: DISCONTINUED | OUTPATIENT
Start: 2024-02-19 | End: 2024-02-19 | Stop reason: HOSPADM

## 2024-02-19 RX ADMIN — ACETAMINOPHEN 975 MG: 325 TABLET, FILM COATED ORAL at 21:52

## 2024-02-19 RX ADMIN — PREGABALIN 200 MG: 100 CAPSULE ORAL at 10:57

## 2024-02-19 RX ADMIN — FLUTICASONE FUROATE AND VILANTEROL TRIFENATATE 1 PUFF: 200; 25 POWDER RESPIRATORY (INHALATION) at 10:54

## 2024-02-19 RX ADMIN — SODIUM CHLORIDE, SODIUM LACTATE, POTASSIUM CHLORIDE, AND CALCIUM CHLORIDE: .6; .31; .03; .02 INJECTION, SOLUTION INTRAVENOUS at 09:00

## 2024-02-19 RX ADMIN — METHOCARBAMOL 250 MG: 500 TABLET ORAL at 05:52

## 2024-02-19 RX ADMIN — CLONAZEPAM 1 MG: 1 TABLET ORAL at 17:39

## 2024-02-19 RX ADMIN — PROPOFOL 120 MCG/KG/MIN: 10 INJECTION, EMULSION INTRAVENOUS at 09:09

## 2024-02-19 RX ADMIN — OXYBUTYNIN CHLORIDE 5 MG: 5 TABLET, EXTENDED RELEASE ORAL at 10:57

## 2024-02-19 RX ADMIN — DOCUSATE SODIUM 100 MG: 100 CAPSULE, LIQUID FILLED ORAL at 10:56

## 2024-02-19 RX ADMIN — INSULIN LISPRO 1 UNITS: 100 INJECTION, SOLUTION INTRAVENOUS; SUBCUTANEOUS at 08:34

## 2024-02-19 RX ADMIN — LIDOCAINE HYDROCHLORIDE 60 MG: 20 INJECTION, SOLUTION EPIDURAL; INFILTRATION; INTRACAUDAL; PERINEURAL at 09:09

## 2024-02-19 RX ADMIN — POLYETHYLENE GLYCOL 3350 17 G: 17 POWDER, FOR SOLUTION ORAL at 10:57

## 2024-02-19 RX ADMIN — ACETAMINOPHEN 975 MG: 325 TABLET, FILM COATED ORAL at 13:43

## 2024-02-19 RX ADMIN — RIVAROXABAN 2.5 MG: 2.5 TABLET, FILM COATED ORAL at 17:39

## 2024-02-19 RX ADMIN — PREGABALIN 200 MG: 100 CAPSULE ORAL at 17:39

## 2024-02-19 RX ADMIN — BUPROPION HYDROCHLORIDE 300 MG: 150 TABLET, EXTENDED RELEASE ORAL at 10:55

## 2024-02-19 RX ADMIN — PROPOFOL 40 MG: 10 INJECTION, EMULSION INTRAVENOUS at 09:09

## 2024-02-19 RX ADMIN — CYANOCOBALAMIN TAB 500 MCG 1000 MCG: 500 TAB at 10:59

## 2024-02-19 RX ADMIN — GLYCOPYRROLATE 0.2 MCG: 0.2 INJECTION, SOLUTION INTRAMUSCULAR; INTRAVENOUS at 09:09

## 2024-02-19 RX ADMIN — METHOCARBAMOL 250 MG: 500 TABLET ORAL at 13:43

## 2024-02-19 RX ADMIN — CEFEPIME 1000 MG: 1 INJECTION, POWDER, FOR SOLUTION INTRAMUSCULAR; INTRAVENOUS at 17:39

## 2024-02-19 RX ADMIN — CEFEPIME 1000 MG: 1 INJECTION, POWDER, FOR SOLUTION INTRAMUSCULAR; INTRAVENOUS at 05:52

## 2024-02-19 RX ADMIN — FENTANYL CITRATE 25 MCG: 50 INJECTION INTRAMUSCULAR; INTRAVENOUS at 09:09

## 2024-02-19 RX ADMIN — ATORVASTATIN CALCIUM 10 MG: 10 TABLET, FILM COATED ORAL at 17:39

## 2024-02-19 RX ADMIN — METHOCARBAMOL 250 MG: 500 TABLET ORAL at 21:52

## 2024-02-19 RX ADMIN — RIVAROXABAN 2.5 MG: 2.5 TABLET, FILM COATED ORAL at 10:54

## 2024-02-19 RX ADMIN — ASPIRIN 81 MG: 81 TABLET, COATED ORAL at 10:55

## 2024-02-19 RX ADMIN — PREGABALIN 200 MG: 100 CAPSULE ORAL at 21:52

## 2024-02-19 RX ADMIN — Medication 1 TABLET: at 10:56

## 2024-02-19 RX ADMIN — PANTOPRAZOLE SODIUM 40 MG: 40 TABLET, DELAYED RELEASE ORAL at 10:54

## 2024-02-19 RX ADMIN — DOCUSATE SODIUM 100 MG: 100 CAPSULE, LIQUID FILLED ORAL at 17:39

## 2024-02-19 RX ADMIN — ACETAMINOPHEN 975 MG: 325 TABLET, FILM COATED ORAL at 05:51

## 2024-02-19 RX ADMIN — NICOTINE 1 PATCH: 14 PATCH, EXTENDED RELEASE TRANSDERMAL at 10:55

## 2024-02-19 RX ADMIN — OMEGA-3 FATTY ACIDS CAP 1000 MG 1000 MG: 1000 CAP at 10:56

## 2024-02-19 NOTE — ANESTHESIA POSTPROCEDURE EVALUATION
Post-Op Assessment Note    CV Status:  Stable  Pain Score: 0    Pain management: adequate       Mental Status:  Alert and awake   Hydration Status:  Stable   PONV Controlled:  None   Airway Patency:  Patent     Post Op Vitals Reviewed: Yes    No anethesia notable event occurred.    Staff: CRNA               /52 (02/19/24 0943)    Temp 97.9 °F (36.6 °C) (02/19/24 0943)    Pulse 76 (02/19/24 0943)   Resp 19 (02/19/24 0943)    SpO2 97 % (02/19/24 0943)

## 2024-02-19 NOTE — ASSESSMENT & PLAN NOTE
Lab Results   Component Value Date    HGBA1C 6.5 (H) 11/07/2023       Recent Labs     02/19/24  0656 02/19/24  0951 02/19/24  1130 02/19/24  1559   POCGLU 172* 149* 131 137         Blood Sugar Average: Last 72 hrs:  (P) 157.75  hold metformin while inpatient  Diabetic diet  Fingersticks QID with sliding scale coverage

## 2024-02-19 NOTE — PROGRESS NOTES
"Podiatry - Progress Note  Patient: Lona Cedeno 78 y.o. female   MRN: [id number]  PCP: Vivek Preston DO  Unit/Bed#: [facility] 815-01 Encounter: [id number]  Date Of Visit: 24    ASSESSMENT:    Lona Cedeno is a 78 y.o. 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 Right foot wound, 2nd digit surgical site and posterior ankle wound noted to be stable with no acute clinical signs of infection. Posterior ankle wound noted to be granulating in well. Will continue with MWF VAC changes until we can proceed with STSG.   Elevation and offloading on green foam wedges or pillows when non-ambulatory.  Rest of care per primary team.     Weightbearing status: Non-weightbearing right foot    Wound VAC application  1. Number of sponges: 1 black sponge  2. Pressure settin continuous  3. Size of wound: 5.8 x 4 x 0.2 cm  4. Description of wound: granular, bleeding  5. Number of sponges applied: 1 black sponge    SUBJECTIVE:     The patient was seen, evaluated, and assessed at bedside today. The patient was awake, alert, and in no acute distress. No acute events overnight. The patient reports no pain on dressing change. Patient denies N/V/F/chills/SOB/CP.      OBJECTIVE:     Vitals:   /71   Pulse 72   Temp 98.6 °F (37 °C)   Resp 18   Ht 5' 1\" (1.549 m)   Wt 68.9 kg (151 lb 14.4 oz)   SpO2 90%   BMI 28.70 kg/m²     Temp (24hrs), Av.7 °F (37.1 °C), Min:98.3 °F (36.8 °C), Max:99.4 °F (37.4 °C)      Physical Exam:     Lungs: Non labored breathing  Abdomen: Soft, non-tender.  Lower Extremity:  Cardiovascular status at baseline from admission.  Neurological status at baseline from admission.  Musculoskeletal status " at baseline from admission. No calf tenderness noted.     Wound #: 1  Location: right posterior ankle  Length 5.8cm: Width 4cm: Depth 0.2cm:   Deepest Tissue Noted in Base: tendon  Probe to Bone: No  Peripheral Skin Description: Attached  Granulation: 100% Fibrotic Tissue: 0% Necrotic Tissue: 0%   Drainage Amount: moderate bloody  Signs of Infection: No    Wound #: 2  Location: right medial foot  Length 2cm: Width 2.8cm: Depth 0.1cm:   Deepest Tissue Noted in Base: subcutaneous  Probe to Bone: No  Peripheral Skin Description: Attached  Granulation: 90% Fibrotic Tissue: 10% Necrotic Tissue: 0%   Drainage Amount: minimal  Signs of Infection: No    Right second digit amputation site with all sutures intact, no dehiscence, no drainage, no ascending erythema or edema.    Clinical Images 02/19/24:                Additional Data:     Labs:    Results from last 7 days   Lab Units 02/19/24  0603 02/16/24  0509 02/15/24  0602   WBC Thousand/uL 8.55   < > 12.41*   HEMOGLOBIN g/dL 10.9*   < > 9.8*   HEMATOCRIT % 35.3   < > 31.8*   PLATELETS Thousands/uL 372   < > 347   NEUTROS PCT %  --   --  87*   LYMPHS PCT %  --   --  9*   MONOS PCT %  --   --  3*   EOS PCT %  --   --  0    < > = values in this interval not displayed.     Results from last 7 days   Lab Units 02/19/24  0603   POTASSIUM mmol/L 4.0   CHLORIDE mmol/L 104   CO2 mmol/L 32   BUN mg/dL 9   CREATININE mg/dL 0.53*   CALCIUM mg/dL 9.8     Results from last 7 days   Lab Units 02/14/24  0446   INR  1.16       * I Have Reviewed All Lab Data Listed Above.    Recent Cultures (last 7 days):     Results from last 7 days   Lab Units 02/14/24  1539 02/14/24  1534 02/13/24  1920 02/13/24  1750   BLOOD CULTURE   --   --   --  No Growth After 5 Days.  No Growth After 5 Days.   GRAM STAIN RESULT  1+ Disintegrating polys*  1+ Gram positive rods* No Polys or Bacteria seen  --   --    URINE CULTURE   --   --  >100,000 cfu/ml Enterobacter cloacae*  --    WOUND CULTURE  1+ Growth of  "Klebsiella pneumoniae*  1+ Growth of Proteus vulgaris group*  Few Colonies of  --   --   --      Results from last 7 days   Lab Units 02/14/24  1539 02/14/24  1534   ANAEROBIC CULTURE  No anaerobes isolated No anaerobes isolated       Imaging: I have personally reviewed pertinent films in PACS  EKG, Pathology, and Other Studies: I have personally reviewed pertinent reports.    ** Please Note: Portions of the record may have been created with voice recognition software. Occasional wrong word or \"sound a like\" substitutions may have occurred due to the inherent limitations of voice recognition software. Read the chart carefully and recognize, using context, where substitutions have occurred. **      "

## 2024-02-19 NOTE — ANESTHESIA PREPROCEDURE EVALUATION
Procedure:  DEBRIDEMENT LOWER EXTREMITY (WASH OUT) (Right: Leg Lower)    Relevant Problems   CARDIO   (+) Essential hypertension   (+) PAD (peripheral artery disease) (HCC)      ENDO   (+) Type 2 diabetes mellitus with diabetic polyneuropathy (HCC)      GI/HEPATIC   (+) GERD without esophagitis      HEMATOLOGY   (+) Other dietary vitamin B12 deficiency anemia      MUSCULOSKELETAL   (+) Degenerative lumbar disc      NEURO/PSYCH   (+) Anxiety and depression      PULMONARY   (+) COPD, severity to be determined (HCC)      Endocrine   (+) Diabetic foot ulcer with osteomyelitis (HCC)      Musculoskeletal and Integument   (+) Ankle ulcer, right, with fat layer exposed (HCC)      Genitourinary   (+) Bladder neoplasm      Other   (+) At risk for venous thromboembolism (VTE)   (+) Hypomagnesemia   (+) Lactic acidosis   (+) Leukocytosis   (+) Sepsis without acute organ dysfunction (HCC)   (+) Surgical site infection   (+) Tobacco use disorder         TTE Aug 2023:    Left Ventricle: Left ventricular cavity size is normal. Wall thickness is mild-moderately increased. The left ventricular ejection fraction is 50-55%. Systolic function is low normal. Although no diagnostic regional wall motion abnormality was identified, this possibility cannot be completely excluded on the basis of this study. Diastolic function is mildly abnormal, consistent with grade I (abnormal) relaxation.    Left Atrium: The atrium is mildly dilated.    Mitral Valve: There is mild annular calcification. There is mild regurgitation.    Tricuspid Valve: There is mild to moderate regurgitation.    Pulmonic Valve: There is mild regurgitation.       EKG 1/23/2024:EKG shows a sinus rhythm at 70 beats a minute with a left axis   deviation.  There is a left anterior fascicular block pattern noted.    There is no other definitive acute ST or T wave changes appreciated.     BMP: NAL=630              Component  Ref Range & Units 2/19/24 0603 2/17/24 0612 2/16/24 3690  2/15/24 0602 2/14/24 0446 2/13/24 0453 2/9/24 0540   WBC  4.31 - 10.16 Thousand/uL 8.55 8.41 10.95 High  12.41 High  18.55 High  14.83 High  9.90   RBC  3.81 - 5.12 Million/uL 3.53 Low  3.15 Low  3.15 Low  3.19 Low  3.51 Low  3.40 Low  3.33 Low    Hemoglobin  11.5 - 15.4 g/dL 10.9 Low  9.8 Low  9.6 Low  9.8 Low  11.0 Low  10.4 Low  10.3 Low    Hematocrit  34.8 - 46.1 % 35.3 31.8 Low  31.4 Low  31.8 Low  33.9 Low  32.8 Low  32.3 Low    MCV  82 - 98 fL 100 High  101 High  100 High  100 High  97 97 97   MCH  26.8 - 34.3 pg 30.9 31.1 30.5 30.7 31.3 30.6 30.9   MCHC  31.4 - 37.4 g/dL 30.9 Low  30.8 Low  30.6 Low  30.8 Low  32.4 31.7 31.9   RDW  11.6 - 15.1 % 14.9 14.8 14.7 14.6 15.0 14.8 14.1   Platelets  149 - 390 Thousands/uL 372 358 354 347 391 High  404 High  367         Anesthesia Plan  ASA Score- 3     Anesthesia Type- IV sedation with anesthesia with ASA Monitors.         Additional Monitors:     Airway Plan:            Plan Factors-    Chart reviewed. EKG reviewed.  Existing labs reviewed. Patient summary reviewed.    Patient is a current smoker.  Patient did not smoke on day of surgery.            Induction- intravenous.    Postoperative Plan-     Informed Consent- Anesthetic plan and risks discussed with patient.  I personally reviewed this patient with the CRNA. Discussed and agreed on the Anesthesia Plan with the CRNA..

## 2024-02-19 NOTE — PROGRESS NOTES
Progress Note - Infectious Disease   Lona Cedeno 78 y.o. female MRN: [id number]  Unit/Bed#: [facility] 815-01 Encounter: [id number]      Impression:  1.  Postoperative right groin wound infection with secondary sepsis and with presumptive involvement of recent axillofemoral graft s/p debridement, washout and VAC placement POD 5,0  2.  Type II DM with PAD s/p remote right ankle fracture with ORIF  and right axillary to femoral bypass 2024 with Achilles debridement and partial second toe amputation with hematoma evacuation  3.  COPD  4.  GERD    Recommendations:  Patient is afebrile with normal WBC count of 8,550 and hemoglobin of 10.9 , picture by vascular surgery  noted.  To discuss therapy with primary service.  Patient was seen after wound debridement with washout and VAC change  1.  So far wound cultures are showing 1+ Klebsiella pneumoniae a and Proteus vulgaris group with susceptibilities pending.  MRSA nares culture is negative  2.  Continue cefepime 1 g every 12 hours IV postop.  Hopefully will be able to have a reasonable p.o. regimen for long-term therapy to cover possibility of graft infection once discharged      Antibiotics:  1.  Cefepime 1 g every 12 hours IV, day 7 Rx    Subjective:  The patient has no complaints.  Denies fevers, chills, or sweats.  Denies nausea, vomiting, or diarrhea.      Objective:  Vitals:  Temp:  [97.3 °F (36.3 °C)-98.6 °F (37 °C)] 97.4 °F (36.3 °C)  HR:  [58-78] 58  Resp:  [12-21] 14  BP: (100-129)/(47-71) 105/71  SpO2:  [88 %-97 %] 88 %  Temp (24hrs), Av °F (36.7 °C), Min:97.3 °F (36.3 °C), Max:98.6 °F (37 °C)  Current: Temperature: (!) 97.4 °F (36.3 °C)    Physical Exam:     General Appearance:  Eyes: Alert, responsive nontoxic, no acute distress.  Conjunctiva pale   Throat: Oropharynx moist without lesions.  Lips, mucosa, and tongue normal   Neck: Supple, symmetrical, trachea midline, no adenopathy,  no tenderness/mass/nodules   Lungs:   Clear to auscultation  bilaterally, no audible wheezes, rhonchi or rales; respirations unlabored   Heart:  Regular rate and rhythm, S1, S2 normal, no murmur, rub or gallop   Abdomen:   Soft, non-tender, non-distended, positive bowel sounds.  No masses, no organomegaly    No CVA tenderness   Extremities: Right groin wound with VAC in place.  Vascular surgery picture of wound 2/19 noted.  Right foot and ankle with dry surgical dressing s/p distal second toe amputation   Skin: Skin color pale, as above.         Invasive Devices       Peripheral Intravenous Line  Duration             Peripheral IV 02/16/24 Right Forearm 2 days              Drain  Duration             Ureteral Internal Stent Left ureter 6 Fr. 91 days                    Labs, Imaging, & Other studies:   All pertinent labs were personally reviewed  Results from last 7 days   Lab Units 02/19/24  0603 02/17/24  0612 02/16/24  0509   WBC Thousand/uL 8.55 8.41 10.95*   HEMOGLOBIN g/dL 10.9* 9.8* 9.6*   PLATELETS Thousands/uL 372 358 354     Results from last 7 days   Lab Units 02/19/24  0603 02/18/24  0645 02/17/24  0612   SODIUM mmol/L 143 140 140   POTASSIUM mmol/L 4.0 3.9 3.8   CHLORIDE mmol/L 104 105 108   CO2 mmol/L 32 31 28   BUN mg/dL 9 10 10   CREATININE mg/dL 0.53* 0.55* 0.47*   EGFR ml/min/1.73sq m 91 90 95   CALCIUM mg/dL 9.8 9.6 9.2     Results from last 7 days   Lab Units 02/14/24  1539 02/14/24  1534 02/14/24  1153 02/13/24  1920 02/13/24  1750   BLOOD CULTURE   --   --   --   --  No Growth After 5 Days.  No Growth After 5 Days.   GRAM STAIN RESULT  1+ Disintegrating polys*  1+ Gram positive rods* No Polys or Bacteria seen  --   --   --    URINE CULTURE   --   --   --  >100,000 cfu/ml Enterobacter cloacae*  --    WOUND CULTURE  1+ Growth of Klebsiella pneumoniae*  1+ Growth of Proteus vulgaris group*  Few Colonies of  --   --   --   --    MRSA CULTURE ONLY   --   --  No Methicillin Resistant Staphlyococcus aureus (MRSA) isolated  --   --

## 2024-02-19 NOTE — PROGRESS NOTES
[facility]  Progress Note  Name: Lona Cedeno I  MRN: [id number]  Unit/Bed#: [facility] 815-01 I Date of Admission: 2/13/2024   Date of Service: 2/19/2024 I Hospital Day: 6    Assessment/Plan   Other dietary vitamin B12 deficiency anemia  Assessment & Plan  B12 level 93  pending MMA   S/p b12 injection 2/17- give additional 2 doses to complete 3 doses total   Started PO supplements as well     Sepsis without acute organ dysfunction (HCC)  Assessment & Plan  Fever/leukocytosis at [facility] prior to arrival here, additionally with lactic acidosis resolved following IV fluids  Concern for surgical site infection at groin, s/p waschout with vac placement on 2/14 with vascular surgery   Received cefepime/vancomycin at [facility] prior to transfer to medical floor-currently on cefepime, vancomycin DC'd 2/16/2024  ID consult appreciated  Blood cultures NGTD  Covid/flu/rsv negative   Wound culture 2/14- (+) Klebsiella and Proteus growing  Anaerobic culture 2/14/2024-pending  Urine culture-prelim> 100,000 enterobacter cloacae noted to be resistant to cefepime- likely secondary to colonization/asymptomatic bacteruria       Constipation  Assessment & Plan  Continue with bowel regimen- S/P BM yesterday    Diabetic foot ulcer with osteomyelitis (HCC)  Assessment & Plan  Podiatry assistance appreciated  Wound vac MWF       Tobacco use disorder  Assessment & Plan  Continue with nicotine patch  Encourage cessation    COPD, severity to be determined ([facility])  Assessment & Plan  Respiratory status is stable  Continue maintenance inhalers and PRN bronchodilators    PAD (peripheral artery disease) ([facility])  Assessment & Plan  1/31 BYPASS AXILLO-FEMORAL, RIGHT WITH 8MM RINGED PTFE GRAFT   Continue aspirin, statin, xarelto 2.5 mg bid     Essential hypertension  Assessment & Plan  Hypotension noted post op- now resolved   C/w holding amiloride, can restart later is indicated  BP is 105/70    Type 2 diabetes mellitus with  diabetic polyneuropathy (HCC)  Assessment & Plan  Lab Results   Component Value Date    HGBA1C 6.5 (H) 11/07/2023       Recent Labs     02/19/24  0656 02/19/24  0951 02/19/24  1130 02/19/24  1559   POCGLU 172* 149* 131 137         Blood Sugar Average: Last 72 hrs:  (P) 157.75  hold metformin while inpatient  Diabetic diet  Fingersticks QID with sliding scale coverage      * Surgical site infection  Assessment & Plan  Continue cefepime, vancomycin DC'd 2/16/2024  MRSA screen negative  Vascular surgery following status post washout with VAC placement on 2/14/2024  S/p washout and debridement today- tolerated well  Infectious disease following-abx as per them- cefepime  Cultures + klebsiella and proteus, pending anaerobic cultures               VTE Pharmacologic Prophylaxis: VTE Score: 3 Moderate Risk (Score 3-4) - Pharmacological DVT Prophylaxis Ordered: heparin.    Mobility:   Basic Mobility Inpatient Raw Score: 14  [facility] Goal: 4: Move to chair/commode  [facility] Achieved: 3: Sit at edge of bed  Needs PT eval.     Patient Centered Rounds: I performed bedside rounds with nursing staff today.   Discussions with Specialists or Other Care Team Provider: noted surgery and ID input .    Education and Discussions with Family / Patient:  called . .     Total Time Spent on Date of Encounter in care of patient: 30 mins. This time was spent on one or more of the following: performing physical exam; counseling and coordination of care; obtaining or reviewing history; documenting in the medical record; reviewing/ordering tests, medications or procedures; communicating with other healthcare professionals and discussing with patient's family/caregivers.    Current Length of Stay: 6 day(s)  Current Patient Status: Inpatient   Certification Statement: The patient will continue to require additional inpatient hospital stay due to IVABx and wound care.   Discharge Plan: Anticipate discharge in 24-48 hrs to to be determined.  "    Code Status: Level 3 - DNAR and DNI    Subjective:   Patient seen and examined.   Feeling \"good\"      Objective:     Vitals:   Temp (24hrs), Av °F (36.7 °C), Min:97.3 °F (36.3 °C), Max:98.6 °F (37 °C)    Temp:  [97.3 °F (36.3 °C)-98.6 °F (37 °C)] 97.4 °F (36.3 °C)  HR:  [58-78] 58  Resp:  [12-21] 14  BP: (100-129)/(47-71) 105/71  SpO2:  [88 %-97 %] 88 %  Body mass index is 28.7 kg/m².     Input and Output Summary (last 24 hours):     Intake/Output Summary (Last 24 hours) at 2024 1626  Last data filed at 2024 1311  Gross per 24 hour   Intake 620 ml   Output 650 ml   Net -30 ml       Physical Exam:   Physical Exam  Constitutional:       General: She is not in acute distress.     Appearance: She is not ill-appearing, toxic-appearing or diaphoretic.   HENT:      Head: Normocephalic.   Eyes:      General: No scleral icterus.        Right eye: No discharge.         Left eye: No discharge.      Pupils: Pupils are equal, round, and reactive to light.   Cardiovascular:      Rate and Rhythm: Normal rate.   Pulmonary:      Effort: Pulmonary effort is normal. No respiratory distress.      Breath sounds: No stridor. No wheezing, rhonchi or rales.   Chest:      Chest wall: No tenderness.   Abdominal:      General: Abdomen is flat. There is no distension.      Palpations: There is no mass.      Tenderness: There is no abdominal tenderness. There is no right CVA tenderness, left CVA tenderness, guarding or rebound.      Hernia: No hernia is present.      Comments: Groin wound looks clean      Musculoskeletal:         General: No swelling, tenderness, deformity or signs of injury.      Right lower leg: No edema.      Left lower leg: No edema.   Skin:     Capillary Refill: Capillary refill takes less than 2 seconds.      Coloration: Skin is not jaundiced or pale.      Findings: No bruising, erythema, lesion or rash.   Neurological:      General: No focal deficit present.      Mental Status: She is alert.      " Cranial Nerves: No cranial nerve deficit.      Sensory: No sensory deficit.      Motor: No weakness.      Coordination: Coordination normal.      Gait: Gait normal.      Deep Tendon Reflexes: Reflexes normal.   Psychiatric:         Mood and Affect: Mood normal.          Additional Data:     Labs:  Results from last 7 days   Lab Units 02/19/24  0603 02/16/24  0509 02/15/24  0602   WBC Thousand/uL 8.55   < > 12.41*   HEMOGLOBIN g/dL 10.9*   < > 9.8*   HEMATOCRIT % 35.3   < > 31.8*   PLATELETS Thousands/uL 372   < > 347   NEUTROS PCT %  --   --  87*   LYMPHS PCT %  --   --  9*   MONOS PCT %  --   --  3*   EOS PCT %  --   --  0    < > = values in this interval not displayed.     Results from last 7 days   Lab Units 02/19/24  0603   SODIUM mmol/L 143   POTASSIUM mmol/L 4.0   CHLORIDE mmol/L 104   CO2 mmol/L 32   BUN mg/dL 9   CREATININE mg/dL 0.53*   ANION GAP mmol/L 7   CALCIUM mg/dL 9.8   GLUCOSE RANDOM mg/dL 152*     Results from last 7 days   Lab Units 02/14/24  0446   INR  1.16     Results from last 7 days   Lab Units 02/19/24  1559 02/19/24  1130 02/19/24  0951 02/19/24  0656 02/18/24  2053 02/18/24  1613 02/18/24  1102 02/18/24  0704 02/17/24  2110 02/17/24  1600 02/17/24  1118 02/17/24  0718   POC GLUCOSE mg/dl 137 131 149* 172* 195* 119 211* 167* 161* 86 175* 161*         Results from last 7 days   Lab Units 02/15/24  0602 02/14/24  0446 02/13/24  2129 02/13/24  1750 02/13/24  1427   LACTIC ACID mmol/L  --   --  1.7 2.3*  --    PROCALCITONIN ng/ml 0.39* 0.52*  --   --  0.06       Lines/Drains:  Invasive Devices       Peripheral Intravenous Line  Duration             Peripheral IV 02/16/24 Right Forearm 2 days              Drain  Duration             Ureteral Internal Stent Left ureter 6 Fr. 91 days                          Imaging: No pertinent imaging reviewed.    Recent Cultures (last 7 days):   Results from last 7 days   Lab Units 02/14/24  1539 02/14/24  1534 02/13/24  1920 02/13/24  1750   BLOOD CULTURE    --   --   --  No Growth After 5 Days.  No Growth After 5 Days.   GRAM STAIN RESULT  1+ Disintegrating polys*  1+ Gram positive rods* No Polys or Bacteria seen  --   --    URINE CULTURE   --   --  >100,000 cfu/ml Enterobacter cloacae*  --    WOUND CULTURE  1+ Growth of Klebsiella pneumoniae*  1+ Growth of Proteus vulgaris group*  Few Colonies of  --   --   --        Last 24 Hours Medication List:   Current Facility-Administered Medications   Medication Dose Route Frequency Provider Last Rate    acetaminophen  975 mg Oral Q8H JILLIAN Calogero Bhaskar, DO      albuterol  2 puff Inhalation Q6H PRN Calogero Bhaskar, DO      aspirin  81 mg Oral Daily Calogero Bhaskar, DO      atorvastatin  10 mg Oral Daily With Dinner Calogero Bhaskar, DO      bisacodyl  10 mg Rectal Daily PRN Calogero Bhaskar, DO      buPROPion  300 mg Oral Daily Calogero Bhaskar, DO      cefepime  1,000 mg Intravenous Q12H Calogero Bhaskar, DO 1,000 mg (02/19/24 0552)    clonazePAM  1 mg Oral QPM Calogero Bhaskar, DO      vitamin B-12  1,000 mcg Oral Daily Calogero Bhaskar, DO      [Transfer Hold] cyanocobalamin  1,000 mcg Intramuscular Daily ROXANE Munoz      docusate sodium  100 mg Oral BID Calogero Bahskar, DO      fish oil  1,000 mg Oral Daily Calogero Bhaskar, DO      fluticasone-vilanterol  1 puff Inhalation Daily Calogero Bhaskar, DO      insulin lispro  1-5 Units Subcutaneous TID With Meals Calogero Bhaskar, DO      lactated ringers  100 mL/hr Intravenous Continuous Shaneka Martinezh Cipriano, CRNA Stopped (02/19/24 1025)    methocarbamol  250 mg Oral Q8H JILLIAN Calogero Bhaskar, DO      multivitamin-minerals  1 tablet Oral Daily Calogero Bhaskar, DO      nicotine  1 patch Transdermal Daily Calogero Bhaskar, DO      ondansetron  4 mg Intravenous Q6H PRN Calogero Bhaskar, DO      oxybutynin  5 mg Oral Daily Calogero Bhaskar, DO      oxyCODONE  2.5 mg Oral Q4H PRN Calogero Bhaskar, DO      Or    oxyCODONE  5 mg Oral Q4H PRN  Calogero Bhaskar, DO      pantoprazole  40 mg Oral Early Morning Calogero Bhaskar, DO      polyethylene glycol  17 g Oral Daily Calogero Bhaskar, DO      pregabalin  200 mg Oral TID Calogero Bhaskar, DO      rivaroxaban  2.5 mg Oral BID With Meals Calogero Bhaskar, DO      senna  2 tablet Oral Daily PRN Calogero Bhaskar, DO          Today, Patient Was Seen By: Flora Ga MD    **Please Note: This note may have been constructed using a voice recognition system.**

## 2024-02-19 NOTE — ASSESSMENT & PLAN NOTE
Hypotension noted post op- now resolved   C/w holding amiloride, can restart later is indicated  BP is 105/70

## 2024-02-19 NOTE — ASSESSMENT & PLAN NOTE
Continue cefepime, vancomycin DC'd 2/16/2024  MRSA screen negative  Vascular surgery following status post washout with VAC placement on 2/14/2024  S/p washout and debridement today- tolerated well  Infectious disease following-abx as per them- cefepime  Cultures + klebsiella and proteus, pending anaerobic cultures

## 2024-02-19 NOTE — PROGRESS NOTES
Progress Note - Infectious Disease   Lona Cedeno 78 y.o. female MRN: [id number]  Unit/Bed#: [facility] 815-01 Encounter: [id number]      Impression:  1.  Postoperative right groin wound infection with secondary sepsis and with presumptive involvement of recent axillofemoral graft s/p debridement, washout and VAC placement POD 4  2.  Type II DM with PAD s/p remote right ankle fracture with ORIF  and right axillary to femoral bypass 2024 with Achilles debridement and partial second toe amputation with hematoma evacuation  3.  COPD  4.  GERD    Recommendations:  Patient is afebrile with normal WBC count of 8,410 and hemoglobin of 9.8 when last taken wound picture by vascular surgery  noted.  Discussed therapy with primary service  1.  So far wound cultures are showing 1+ Klebsiella pneumoniae a and Proteus vulgaris group with susceptibilities pending.  MRSA nares culture is negative  2.  Continue cefepime 1 g every 12 hours IV through surgery Hopefully will be able to have a reasonable p.o. regimen for long-term therapy to cover possibility of graft infection once discharged  3.  She is scheduled for a repeat wound washout and VAC change by vascular surgery on     Antibiotics:  1.  Cefepime 1 g every 12 hours IV, day 6 Rx    Subjective:  The patient has no complaints.  Denies fevers, chills, or sweats.  Denies nausea, vomiting, or diarrhea.      Objective:  Vitals:  Temp:  [98.3 °F (36.8 °C)-99.4 °F (37.4 °C)] 99.4 °F (37.4 °C)  HR:  [65-74] 65  Resp:  [16] 16  BP: (138-149)/(55-87) 138/70  SpO2:  [89 %-95 %] 92 %  Temp (24hrs), Av.8 °F (37.1 °C), Min:98.3 °F (36.8 °C), Max:99.4 °F (37.4 °C)  Current: Temperature: 99.4 °F (37.4 °C)    Physical Exam:     General Appearance:  Eyes: Alert, responsive nontoxic, no acute distress.  Conjunctiva pale   Throat: Oropharynx moist without lesions.  Lips, mucosa, and tongue normal   Neck: Supple, symmetrical, trachea midline, no adenopathy,  no  tenderness/mass/nodules   Lungs:   Clear to auscultation bilaterally, no audible wheezes, rhonchi or rales; respirations unlabored   Heart:  Regular rate and rhythm, S1, S2 normal, no murmur, rub or gallop   Abdomen:   Soft, non-tender, non-distended, positive bowel sounds.  No masses, no organomegaly    No CVA tenderness   Extremities: Right groin wound with VAC in place.  Vascular surgery picture shows 2/16 wound to have granulation tissue without overt purulence.  Right foot and ankle with dry surgical dressing s/p distal second toe amputation   Skin: Skin color pale, as above.         Invasive Devices       Peripheral Intravenous Line  Duration             Peripheral IV 02/16/24 Right Forearm 2 days              Drain  Duration             Ureteral Internal Stent Left ureter 6 Fr. 90 days                    Labs, Imaging, & Other studies:   All pertinent labs were personally reviewed  Results from last 7 days   Lab Units 02/17/24  0612 02/16/24  0509 02/15/24  0602   WBC Thousand/uL 8.41 10.95* 12.41*   HEMOGLOBIN g/dL 9.8* 9.6* 9.8*   PLATELETS Thousands/uL 358 354 347     Results from last 7 days   Lab Units 02/18/24  0645 02/17/24  0612 02/16/24  0509   SODIUM mmol/L 140 140 137   POTASSIUM mmol/L 3.9 3.8 4.1   CHLORIDE mmol/L 105 108 105   CO2 mmol/L 31 28 26   BUN mg/dL 10 10 12   CREATININE mg/dL 0.55* 0.47* 0.56*   EGFR ml/min/1.73sq m 90 95 90   CALCIUM mg/dL 9.6 9.2 8.7     Results from last 7 days   Lab Units 02/14/24  1539 02/14/24  1534 02/14/24  1153 02/13/24  1920 02/13/24  1750   BLOOD CULTURE   --   --   --   --  No Growth After 4 Days.  No Growth After 4 Days.   GRAM STAIN RESULT  1+ Disintegrating polys*  1+ Gram positive rods* No Polys or Bacteria seen  --   --   --    URINE CULTURE   --   --   --  >100,000 cfu/ml Enterobacter cloacae*  --    WOUND CULTURE  1+ Growth of Klebsiella pneumoniae*  1+ Growth of Proteus vulgaris group*  Few Colonies of  --   --   --   --    MRSA CULTURE ONLY    --   --  No Methicillin Resistant Staphlyococcus aureus (MRSA) isolated  --   --

## 2024-02-19 NOTE — ASSESSMENT & PLAN NOTE
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 Now represents from [facility] with fevers, leukocytosis, encephalopathy with concern for Right groin wound sloughing at incision site.    S/P Right Groin washout and vac 2/14  S/P Right Axillary I&D, Pods Achilles debridement, partial 2nd to amp, Hematoma evac 2/4  S/P Right Ax to Fem PTFE bypass 1/31     Plan:  OR today for right groin wound washout, VAC change  NPO  Continue Xarelto 2.5mg BID   Continue ASA, statin  ID following, ABX- Cefepime, WCx-Klebsiella/Proteus, UCx-Enterobacter  Podiatry for Right foot wound managment

## 2024-02-19 NOTE — ASSESSMENT & PLAN NOTE
Fever/leukocytosis at [facility] prior to arrival here, additionally with lactic acidosis resolved following IV fluids  Concern for surgical site infection at groin, s/p waschout with vac placement on 2/14 with vascular surgery   Received cefepime/vancomycin at [facility] prior to transfer to medical floor-currently on cefepime, vancomycin DC'd 2/16/2024  ID consult appreciated  Blood cultures NGTD  Covid/flu/rsv negative   Wound culture 2/14- (+) Klebsiella and Proteus growing  Anaerobic culture 2/14/2024-pending  Urine culture-prelim> 100,000 enterobacter cloacae noted to be resistant to cefepime- likely secondary to colonization/asymptomatic bacteruria

## 2024-02-19 NOTE — OP NOTE
OPERATIVE REPORT  PATIENT NAME: Lona Cedeno    :  1945  MRN: [id number]  Pt Location: [facility] OR ROOM 02    SURGERY DATE: 2024    Surgeons and Role:     * Suly Muro DO - Primary    Preop Diagnosis:  Surgical site infection [T81.49XA]    Post-Op Diagnosis Codes:     * Surgical site infection [T81.49XA]    Procedure(s):  Right - DEBRIDEMENT LOWER EXTREMITY. washout    Specimen(s):  * No specimens in log *    Estimated Blood Loss:   Minimal    Drains:  Ureteral Internal Stent Left ureter 6 Fr. (Active)   Number of days: 91       Anesthesia Type:   IV Sedation with Anesthesia    Operative Indications:  Surgical site infection [T81.49XA]  Patient is a 78-year-old woman who previously underwent a right ax to right femoral bypass.  She developed right groin access wound/infection status post groin exploration, washout, debridement, and VAC placement.  Patient is now returned to the operating room for a formal VAC change.  The procedure, risk, benefits, alternatives, and anticipated postoperative course were discussed with the patient.  Written consent had been obtained.  The operative site was marked.    Operative Findings:  No exposed Graft within wound bed  No purulent drainage    Several monocryl sutures placed at the most cephalad portion of the right groin wound  Final wound dimension 5cm x 1.5cm x 1cm  A single piece of VAC sponge placed within wound and a separate 2nd piece used as a bridge.  VAC connected to low continuous 125cm H2O suction with good seal.    Complications:   None    Procedure and Technique:  The patient was probably identified in the preop holding area.  The patient's name, laterality, nature procedure verified.  The operative site was marked.  The patient was brought to the operating room where she was positioned supine on the OR table.  After adequate sedation the existing VAC was removed.  The right groin wound was prepped using Betadine and draped in usual sterile  fashion.  Patient has been on standing doses of antibiotics.  A formal OR timeout was performed.  The wound was inspected.  There was no purulent drainage.  There was no exposed graft.  Partial deep closure of the wound using 2-0 Monocryl sutures was performed along the most cephalad portion of the open wound.  The final wound dimensions following partial closure was 5cm x 1.5cm x 1cm.  A single piece of black sponge was cut to size and placed within the wound bed.  A separate piece was used as a bridge.  The VAC sponges were held in place using VAC adhesive.  The VAC was subsequently connected to low continuous 125 cm of water suction with good seal.  The patient tolerated procedure well.  All needles, joints, and sponge counts reported correct.  She was awakened and transferred to recovery room and stable condition.   I was present for the entire procedure.    Patient Disposition:  PACU         SIGNATURE: Suly Muro DO  DATE: February 19, 2024  TIME: 9:37 AM

## 2024-02-19 NOTE — PROGRESS NOTES
"[facility]  Progress Note  Name: Lona Cedeon I  MRN: [id number]  Unit/Bed#: [facility] 815-01 I Date of Admission: 2/13/2024   Date of Service: 2/19/2024 I Hospital Day: 6    Assessment/Plan   PAD (peripheral artery disease) ([facility])  Assessment & Plan  79 y/o F w/ history of smoking, HTN, T2DM, GERD, COPD, R ankle fx s/p ORIF '08, AOID with nonhealing R foot wounds S/P Right Ax to Fem PTFE bypass 1/31 and Right chest wall hematoma evacuation, right foot wound and achilles debridement with 2nd toe partial amputation, VAC placement 2/4. Now represents from [facility] with fevers, leukocytosis, encephalopathy with concern for Right groin wound sloughing at incision site.    S/P Right Groin washout and vac 2/14  S/P Right Axillary I&D, Pods Achilles debridement, partial 2nd to amp, Hematoma evac 2/4  S/P Right Ax to Fem PTFE bypass 1/31     Plan:  OR today for right groin wound washout, VAC change  NPO  Continue Xarelto 2.5mg BID   Continue ASA, statin  ID following, ABX- Cefepime, WCx-Klebsiella/Proteus, UCx-Enterobacter  Podiatry for Right foot wound managment             Subjective:  Pt doing well, no events overnight    Vitals:  /71   Pulse 72   Temp 98.6 °F (37 °C)   Resp 18   Ht 5' 1\" (1.549 m)   Wt 68.9 kg (151 lb 14.4 oz)   SpO2 90%   BMI 28.70 kg/m²     I/Os:  I/O last 3 completed shifts:  In: 1040 [P.O.:1040]  Out: 4050 [Urine:4050]  I/O this shift:  In: -   Out: 650 [Urine:650]    Lab Results and Cultures:   Lab Results   Component Value Date    WBC 8.55 02/19/2024    HGB 10.9 (L) 02/19/2024    HCT 35.3 02/19/2024     (H) 02/19/2024     02/19/2024     Lab Results   Component Value Date    GLUCOSE 143 (H) 01/31/2024    CALCIUM 9.8 02/19/2024     05/21/2018    K 4.0 02/19/2024    CO2 32 02/19/2024     02/19/2024    BUN 9 02/19/2024    CREATININE 0.53 (L) 02/19/2024     Lab Results   Component Value Date    INR 1.16 02/14/2024    INR 1.03 " 02/04/2024    INR 1.11 02/03/2024    PROTIME 14.7 (H) 02/14/2024    PROTIME 13.4 02/04/2024    PROTIME 14.2 02/03/2024        Blood Culture:   Lab Results   Component Value Date    BLOODCX No Growth After 5 Days. 02/13/2024    BLOODCX No Growth After 5 Days. 02/13/2024   ,   Urinalysis:   Lab Results   Component Value Date    COLORU Light Yellow 02/14/2024    COLORU YELLOW 05/21/2018    CLARITYU Clear 02/14/2024    CLARITYU CLEAR 05/21/2018    SPECGRAV 1.009 02/14/2024    SPECGRAV 1.015 05/21/2018    PHUR 5.5 02/14/2024    PHUR 6.0 05/21/2018    LEUKOCYTESUR Large (A) 02/14/2024    LEUKOCYTESUR NEGATIVE 05/21/2018    NITRITE Negative 02/14/2024    NITRITE NEGATIVE 05/21/2018    PROTEINUA NEGATIVE 05/21/2018    GLUCOSEU Negative 02/14/2024    GLUCOSEU NEGATIVE 05/21/2018    KETONESU Negative 02/14/2024    KETONESU NEGATIVE 05/21/2018    BILIRUBINUR Negative 02/14/2024    BILIRUBINUR NEGATIVE 05/21/2018    BLOODU Negative 02/14/2024    BLOODU NEGATIVE 05/21/2018   ,   Urine Culture:   Lab Results   Component Value Date    URINECX >100,000 cfu/ml Enterobacter cloacae (A) 02/13/2024   ,   Wound Culure:   Lab Results   Component Value Date    WOUNDCULT 1+ Growth of Klebsiella pneumoniae (A) 02/14/2024    WOUNDCULT 1+ Growth of Proteus vulgaris group (A) 02/14/2024    WOUNDCULT Few Colonies of 02/14/2024       Medications:  Current Facility-Administered Medications   Medication Dose Route Frequency    acetaminophen (TYLENOL) tablet 975 mg  975 mg Oral Q8H JILLIAN    albuterol (PROVENTIL HFA,VENTOLIN HFA) inhaler 2 puff  2 puff Inhalation Q6H PRN    aspirin (ECOTRIN LOW STRENGTH) EC tablet 81 mg  81 mg Oral Daily    atorvastatin (LIPITOR) tablet 10 mg  10 mg Oral Daily With Dinner    bisacodyl (DULCOLAX) rectal suppository 10 mg  10 mg Rectal Daily PRN    buPROPion (WELLBUTRIN XL) 24 hr tablet 300 mg  300 mg Oral Daily    cefepime (MAXIPIME) 1,000 mg in dextrose 5 % 50 mL IVPB  1,000 mg Intravenous Q12H    chlorhexidine  (PERIDEX) 0.12 % oral rinse 15 mL  15 mL Swish & Spit Once    clonazePAM (KlonoPIN) tablet 1 mg  1 mg Oral QPM    cyanocobalamin (VITAMIN B-12) tablet 1,000 mcg  1,000 mcg Oral Daily    cyanocobalamin injection 1,000 mcg  1,000 mcg Intramuscular Daily    docusate sodium (COLACE) capsule 100 mg  100 mg Oral BID    fish oil capsule 1,000 mg  1,000 mg Oral Daily    fluticasone-vilanterol 200-25 mcg/actuation 1 puff  1 puff Inhalation Daily    insulin lispro (HumaLOG) 100 units/mL subcutaneous injection 1-5 Units  1-5 Units Subcutaneous TID With Meals    methocarbamol (ROBAXIN) tablet 250 mg  250 mg Oral Q8H JILLIAN    multivitamin-minerals (CENTRUM) tablet 1 tablet  1 tablet Oral Daily    nicotine (NICODERM CQ) 14 mg/24hr TD 24 hr patch 1 patch  1 patch Transdermal Daily    ondansetron (ZOFRAN) injection 4 mg  4 mg Intravenous Q6H PRN    oxybutynin (DITROPAN-XL) 24 hr tablet 5 mg  5 mg Oral Daily    oxyCODONE (ROXICODONE) split tablet 2.5 mg  2.5 mg Oral Q4H PRN    Or    oxyCODONE (ROXICODONE) IR tablet 5 mg  5 mg Oral Q4H PRN    pantoprazole (PROTONIX) EC tablet 40 mg  40 mg Oral Early Morning    polyethylene glycol (MIRALAX) packet 17 g  17 g Oral Daily    pregabalin (LYRICA) capsule 200 mg  200 mg Oral TID    rivaroxaban (XARELTO) tablet 2.5 mg  2.5 mg Oral BID With Meals    senna (SENOKOT) tablet 17.2 mg  2 tablet Oral Daily PRN         Physical Exam:    General appearance: alert and oriented, in no acute distress  Lungs: clear to auscultation bilaterally  Heart: S1, S2 normal  Abdomen: soft, non-tender; bowel sounds normal; no masses,  no organomegaly  Extremities: extremities warm and well-perfused; M/S intact    Wound/Incision:  Right groin VAC intact, Right chest wall w/ edema/noerythema, counter  incision clean, dry, and intact    Pulse exam:  DP: Right: doppler signal         Shaneka Ortega PA-C  2/19/2024

## 2024-02-20 LAB
GLUCOSE SERPL-MCNC: 141 MG/DL (ref 65–140)
GLUCOSE SERPL-MCNC: 149 MG/DL (ref 65–140)
GLUCOSE SERPL-MCNC: 152 MG/DL (ref 65–140)
GLUCOSE SERPL-MCNC: 186 MG/DL (ref 65–140)

## 2024-02-20 PROCEDURE — 99232 SBSQ HOSP IP/OBS MODERATE 35: CPT | Performed by: STUDENT IN AN ORGANIZED HEALTH CARE EDUCATION/TRAINING PROGRAM

## 2024-02-20 PROCEDURE — 97167 OT EVAL HIGH COMPLEX 60 MIN: CPT

## 2024-02-20 PROCEDURE — 99232 SBSQ HOSP IP/OBS MODERATE 35: CPT | Performed by: INTERNAL MEDICINE

## 2024-02-20 PROCEDURE — 97163 PT EVAL HIGH COMPLEX 45 MIN: CPT

## 2024-02-20 PROCEDURE — 82948 REAGENT STRIP/BLOOD GLUCOSE: CPT

## 2024-02-20 PROCEDURE — 99024 POSTOP FOLLOW-UP VISIT: CPT | Performed by: SURGERY

## 2024-02-20 RX ORDER — BISACODYL 10 MG
10 SUPPOSITORY, RECTAL RECTAL DAILY PRN
Status: DISCONTINUED | OUTPATIENT
Start: 2024-02-20 | End: 2024-02-21

## 2024-02-20 RX ADMIN — OXYCODONE HYDROCHLORIDE 5 MG: 5 TABLET ORAL at 08:28

## 2024-02-20 RX ADMIN — DOCUSATE SODIUM 100 MG: 100 CAPSULE, LIQUID FILLED ORAL at 08:21

## 2024-02-20 RX ADMIN — ASPIRIN 81 MG: 81 TABLET, COATED ORAL at 07:56

## 2024-02-20 RX ADMIN — CEFEPIME 1000 MG: 1 INJECTION, POWDER, FOR SOLUTION INTRAMUSCULAR; INTRAVENOUS at 07:59

## 2024-02-20 RX ADMIN — PANTOPRAZOLE SODIUM 40 MG: 40 TABLET, DELAYED RELEASE ORAL at 07:56

## 2024-02-20 RX ADMIN — ATORVASTATIN CALCIUM 10 MG: 10 TABLET, FILM COATED ORAL at 17:17

## 2024-02-20 RX ADMIN — CEFEPIME 1000 MG: 1 INJECTION, POWDER, FOR SOLUTION INTRAMUSCULAR; INTRAVENOUS at 17:16

## 2024-02-20 RX ADMIN — RIVAROXABAN 2.5 MG: 2.5 TABLET, FILM COATED ORAL at 07:56

## 2024-02-20 RX ADMIN — INSULIN LISPRO 1 UNITS: 100 INJECTION, SOLUTION INTRAVENOUS; SUBCUTANEOUS at 12:09

## 2024-02-20 RX ADMIN — RIVAROXABAN 2.5 MG: 2.5 TABLET, FILM COATED ORAL at 17:16

## 2024-02-20 RX ADMIN — PREGABALIN 200 MG: 100 CAPSULE ORAL at 17:17

## 2024-02-20 RX ADMIN — POLYETHYLENE GLYCOL 3350 17 G: 17 POWDER, FOR SOLUTION ORAL at 08:21

## 2024-02-20 RX ADMIN — OMEGA-3 FATTY ACIDS CAP 1000 MG 1000 MG: 1000 CAP at 08:22

## 2024-02-20 RX ADMIN — SODIUM CHLORIDE, SODIUM LACTATE, POTASSIUM CHLORIDE, AND CALCIUM CHLORIDE 100 ML/HR: .6; .31; .03; .02 INJECTION, SOLUTION INTRAVENOUS at 04:19

## 2024-02-20 RX ADMIN — CLONAZEPAM 1 MG: 1 TABLET ORAL at 17:16

## 2024-02-20 RX ADMIN — OXYBUTYNIN CHLORIDE 5 MG: 5 TABLET, EXTENDED RELEASE ORAL at 08:21

## 2024-02-20 RX ADMIN — ACETAMINOPHEN 975 MG: 325 TABLET, FILM COATED ORAL at 14:15

## 2024-02-20 RX ADMIN — ACETAMINOPHEN 975 MG: 325 TABLET, FILM COATED ORAL at 21:14

## 2024-02-20 RX ADMIN — CYANOCOBALAMIN TAB 500 MCG 1000 MCG: 500 TAB at 07:56

## 2024-02-20 RX ADMIN — INSULIN LISPRO 1 UNITS: 100 INJECTION, SOLUTION INTRAVENOUS; SUBCUTANEOUS at 07:54

## 2024-02-20 RX ADMIN — PREGABALIN 200 MG: 100 CAPSULE ORAL at 21:14

## 2024-02-20 RX ADMIN — ACETAMINOPHEN 975 MG: 325 TABLET, FILM COATED ORAL at 07:56

## 2024-02-20 RX ADMIN — METHOCARBAMOL 250 MG: 500 TABLET ORAL at 07:56

## 2024-02-20 RX ADMIN — BUPROPION HYDROCHLORIDE 300 MG: 150 TABLET, EXTENDED RELEASE ORAL at 07:56

## 2024-02-20 RX ADMIN — METHOCARBAMOL 250 MG: 500 TABLET ORAL at 21:14

## 2024-02-20 RX ADMIN — PREGABALIN 200 MG: 100 CAPSULE ORAL at 08:21

## 2024-02-20 RX ADMIN — NICOTINE 1 PATCH: 14 PATCH, EXTENDED RELEASE TRANSDERMAL at 08:23

## 2024-02-20 RX ADMIN — METHOCARBAMOL 250 MG: 500 TABLET ORAL at 14:15

## 2024-02-20 RX ADMIN — FLUTICASONE FUROATE AND VILANTEROL TRIFENATATE 1 PUFF: 200; 25 POWDER RESPIRATORY (INHALATION) at 07:57

## 2024-02-20 RX ADMIN — Medication 1 TABLET: at 08:21

## 2024-02-20 RX ADMIN — DOCUSATE SODIUM 100 MG: 100 CAPSULE, LIQUID FILLED ORAL at 17:17

## 2024-02-20 NOTE — ASSESSMENT & PLAN NOTE
Continue cefepime, vancomycin DC'd 2/16/2024  MRSA screen negative  Right groin wound breakdown status post right groin washout, VAC placement on 2/14 and 2/19  Infectious disease following-abx as per them- cefepime  Cultures + klebsiella and proteus, anaerobic cultures negative growth thus far

## 2024-02-20 NOTE — PROGRESS NOTES
Progress Note - Infectious Disease   Lona Cedeno 78 y.o. female MRN: [id number]  Unit/Bed#: [facility] 815-01 Encounter: [id number]      Impression:  1.  Postoperative right groin wound infection with secondary sepsis and with presumptive involvement of recent axillofemoral graft s/p debridement, washout and VAC placement POD 6,1  2.  Type II DM with PAD s/p remote right ankle fracture with ORIF  and right axillary to femoral bypass 2024 with Achilles debridement and partial second toe amputation with hematoma evacuation  3.  COPD  4.  GERD    Recommendations:  Patient is afebrile with normal WBC count of 8,550 and hemoglobin of 10.9 when last taken, picture by vascular surgery  noted.  To discuss therapy with primary service.    1.  So far wound cultures are showing 1+ Klebsiella pneumoniae  and Proteus vulgaris group both susceptible to third-generation cephalosporin.  MRSA nares culture is negative  2.  Continue cefepime 1 g every 12 hours IV postop.  Hopefully will be able to have a reasonable p.o. regimen for long-term therapy to cover possibility of graft infection once discharged      Antibiotics:  1.  Cefepime 1 g every 12 hours IV, day 8 Rx    Subjective:  Complains of constipation.  No BM x 72 hours on MiraLAX  Denies fevers, chills, or sweats.  Denies nausea, vomiting, or diarrhea.      Objective:  Vitals:  Temp:  [96.9 °F (36.1 °C)-99.2 °F (37.3 °C)] 99.2 °F (37.3 °C)  HR:  [70-80] 71  Resp:  [12-16] 16  BP: (105-138)/(52-73) 119/52  SpO2:  [91 %-95 %] 92 %  Temp (24hrs), Av.3 °F (36.8 °C), Min:96.9 °F (36.1 °C), Max:99.2 °F (37.3 °C)  Current: Temperature: 99.2 °F (37.3 °C)    Physical Exam:     General Appearance:  Eyes: Alert, responsive nontoxic, no acute distress.  Conjunctiva pale   Throat: Oropharynx moist without lesions.  Lips, mucosa, and tongue normal   Neck: Supple, symmetrical, trachea midline, no adenopathy,  no tenderness/mass/nodules   Lungs:   Clear to auscultation  bilaterally, no audible wheezes, rhonchi or rales; respirations unlabored   Heart:  Regular rate and rhythm, S1, S2 normal, no murmur, rub or gallop   Abdomen:   Soft, non-tender, non-distended, positive bowel sounds.  No masses, no organomegaly    No CVA tenderness   Extremities: Right groin wound with VAC in place.  Vascular surgery picture of wound 2/19 noted.  Right foot and ankle with dry surgical dressing s/p distal second toe amputation   Skin: Skin color pale, as above.         Invasive Devices       Peripheral Intravenous Line  Duration             Peripheral IV 02/16/24 Right Forearm 3 days              Drain  Duration             Ureteral Internal Stent Left ureter 6 Fr. 92 days                    Labs, Imaging, & Other studies:   All pertinent labs were personally reviewed  Results from last 7 days   Lab Units 02/19/24  0603 02/17/24  0612 02/16/24  0509   WBC Thousand/uL 8.55 8.41 10.95*   HEMOGLOBIN g/dL 10.9* 9.8* 9.6*   PLATELETS Thousands/uL 372 358 354     Results from last 7 days   Lab Units 02/19/24  0603 02/18/24  0645 02/17/24  0612   SODIUM mmol/L 143 140 140   POTASSIUM mmol/L 4.0 3.9 3.8   CHLORIDE mmol/L 104 105 108   CO2 mmol/L 32 31 28   BUN mg/dL 9 10 10   CREATININE mg/dL 0.53* 0.55* 0.47*   EGFR ml/min/1.73sq m 91 90 95   CALCIUM mg/dL 9.8 9.6 9.2     Results from last 7 days   Lab Units 02/14/24  1539 02/14/24  1534 02/14/24  1153 02/13/24  1920   GRAM STAIN RESULT  1+ Disintegrating polys*  1+ Gram positive rods* No Polys or Bacteria seen  --   --    URINE CULTURE   --   --   --  >100,000 cfu/ml Enterobacter cloacae*   WOUND CULTURE  1+ Growth of Klebsiella pneumoniae*  1+ Growth of Proteus vulgaris group*  Few Colonies of  --   --   --    MRSA CULTURE ONLY   --   --  No Methicillin Resistant Staphlyococcus aureus (MRSA) isolated  --

## 2024-02-20 NOTE — OCCUPATIONAL THERAPY NOTE
Occupational Therapy Evaluation     Patient Name: Lona Cedeno  Today's Date: 2024  Problem List  Principal Problem:    Surgical site infection  Active Problems:    Type 2 diabetes mellitus with diabetic polyneuropathy (HCC)    Essential hypertension    PAD (peripheral artery disease) (HCC)    COPD, severity to be determined (HCC)    Tobacco use disorder    Diabetic foot ulcer with osteomyelitis (HCC)    Constipation    Sepsis without acute organ dysfunction (HCC)    Other dietary vitamin B12 deficiency anemia    Past Medical History  Past Medical History:   Diagnosis Date    Anxiety     Closed fracture of coccyx (HCC) 2023    Diabetes mellitus (HCC)     GERD (gastroesophageal reflux disease)     Hyperlipidemia     Hypertension     IBS (irritable bowel syndrome)     Shoulder fracture      Past Surgical History  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;  Surgeon: Cristian Child MD;  Location: [facility];  Service: Urology    EVACUATION OF HEMATOMA Right 2024    Procedure: EVACUATION/ DRAINAGE CHEST WALL  HEMATOMA;  Surgeon: Seth Hoyos MD;  Location: [facility];  Service: Vascular    FL RETROGRADE PYELOGRAM  2023    HERNIA REPAIR      umbilicus w/ mesh    OK BYPASS W/VEIN AXILLARY-FEMORAL Right 2024    Procedure: BYPASS AXILLO-FEMORAL, RIGHT WITH 8MM RINGED PTFE GRAFT;  Surgeon: Seth Hoyos MD;  Location: [facility];  Service: Vascular    WOUND DEBRIDEMENT Right 2024    Procedure:  RIGHT WOUND AND ACHILLES DEBRIDEMENT FOOT/TOE (WASH OUT); PARTIAL AMPUTATION DISTAL 2ND TOE;  Surgeon: Aaron Montero DPM;  Location: [facility];  Service: Podiatry    WOUND DEBRIDEMENT Right 2/14/2024    Procedure: DEBRIDEMENT RIGHT GROIN  WOUND AND WASHOUT, APPLICATION OF WOUND VAC;  Surgeon: Suly Muro DO;  Location: [facility];  Service: Vascular    WOUND DEBRIDEMENT Right 2/19/2024    Procedure: DEBRIDEMENT LOWER EXTREMITY, washout;  Surgeon: Suly Muro DO;  Location: [facility];  Service: Vascular           02/20/24 1446   OT Last Visit   OT Visit Date 02/20/24   Note Type   Note type Evaluation   Pain Assessment   Pain Assessment Tool 0-10   Pain Location/Orientation Orientation: Right;Location: Leg   Pain Onset/Description Onset: Ongoing   Patient's Stated Pain Goal No pain   Hospital Pain Intervention(s) Repositioned;Ambulation/increased activity;Emotional support   Restrictions/Precautions   Weight Bearing Precautions Per Order Yes   RLE Weight Bearing Per Order (S)  NWB   Braces or Orthoses Other (Comment)  (Prevalon boot)   Other Precautions Chair Alarm;Bed Alarm;Cognitive;WBS;Multiple lines;Fall Risk;Pain  (Wound vac R groin, wound vac R ankle)   Home Living   Type of Home House   Home Layout One level;Performs ADLs on one level;Able to live on main level with bedroom/bathroom;Stairs to enter with rails  (5+5STE)   Bathroom Shower/Tub Walk-in shower   Bathroom Toilet Raised   Bathroom Equipment Grab bars in shower;Shower chair   Home Equipment Cane   Prior Function   Level of [place] Independent with ADLs;Independent with functional mobility;Independent with IADLS   Lives With Spouse;Daughter   Receives Help From Family   IADLs Independent with driving;Independent with meal prep;Independent with medication management   Falls in the last 6 months 0   Vocational Retired   Comments 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   Service to Others Pt is retired   Intrinsic Gratification Pt enjoys reading   Subjective   Subjective \"I've been her for 5 weeks now\"   ADL   Where Assessed Edge of bed   Eating Assistance 7  Independent   Grooming Assistance 5  Supervision/Setup   UB Bathing Assistance 4  Minimal Assistance   LB Bathing Assistance 3  Moderate Assistance   UB Dressing Assistance 4  Minimal Assistance   LB Dressing Assistance 3  Moderate Assistance   Toileting Assistance  4  Minimal Assistance   Bed Mobility   Supine to Sit 5  Supervision   Additional items Increased time required;Bedrails;Verbal cues   Sit to Supine Unable to assess   Additional Comments Pt OOB in recliner post session, all needs met, call bell within reach   Transfers   Sit to Stand 4  Minimal assistance   Additional items Assist x 2;Increased time required;Verbal cues   Stand to Sit 4  Minimal assistance   Additional items Assist x 2;Increased time required;Verbal cues   Stand pivot 4  Minimal assistance   Additional items Assist x 2;Increased time required;Verbal cues   Additional Comments b/l HHA 2/2 NWB RLE. Requiring VCs t/o session to maintain   Functional Mobility   Functional Mobility 4  Minimal assistance   Additional Comments Ax2, SPT to chair with b/l HHA   Additional items Hand hold assistance   Balance   Static Sitting Fair +   Dynamic Sitting Fair -   Static Standing Fair -   Dynamic Standing Poor +   Activity Tolerance   Activity Tolerance Patient limited by fatigue;Patient limited by pain   Medical Staff Made Aware PT sebastian Garcias 2/2 increased medical complexity and comorbidities   Nurse Made Aware RN cleared for therapy   RUE Assessment   RUE Assessment WFL   LUE Assessment   LUE Assessment WFL   Cognition   Overall Cognitive Status Impaired "   Arousal/Participation Alert;Cooperative   Attention Attends with cues to redirect   Orientation Level Oriented X4   Memory Decreased recall of precautions   Following Commands Follows one step commands with increased time or repetition   Comments Pt cooperative, requiring mod VCs to maintain WB status. Pt mildly impulsive. VCs for safety awareness.   Assessment   Limitation Decreased ADL status;Decreased Safe judgement during ADL;Decreased cognition;Decreased endurance;Decreased self-care trans;Decreased high-level ADLs   Prognosis Good   Assessment 78 year old pt seen today for an OT evaluation following admission to [facility] 2/2 surgical site infection with present symptoms significant for R LE pain, weakness, fatigue, decreased ADL status, decreased functional mobility. Pt has a past medical history of Anxiety, Closed fracture of coccyx (HCC), Diabetes mellitus (HCC), GERD (gastroesophageal reflux disease), Hyperlipidemia, Hypertension, IBS (irritable bowel syndrome), and Shoulder fracture. Pt with recent admission where she was d/c to [facility], now returning 2/2 infection. Pt reported that she lives with her spouse and daughter in a 1SH, 5+5 PATO with [facility]. Pt reported being IND with all ADLs/IADLs PTA with SPC vs rollator used for functional mobility. 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 +. Pt pleasant and cooperative t/o session, requiring VCs for safety awareness and maintaining WBS of NWB to RLE. Pt completed functional bed mobility with SUP. Min Ax2 for functional STS txfs with bilateral HHA. Min Ax2 for functional mobility with b/l HHA. Pt was min A for UB ADLs and Mod A for LB ADLs. The patient's raw score on the AM-PAC Daily Activity Inpatient Short Form is 17. A raw score of less than 19 suggests the patient may benefit from discharge to post-acute rehabilitation services. Please refer to the  recommendation of the Occupational Therapist for safe discharge planning. Pt will continue to benefit from skilled acute OT to promote increased independence and return to PLOF. At this time, current OT recommendation is Level 1 resources.   Goals   Patient Goals to get up   LTG Time Frame 10-14   Long Term Goal #1 See below for goals   Plan   Treatment Interventions ADL retraining;Functional transfer training;Endurance training;Cognitive reorientation;Patient/family training;Compensatory technique education;Continued evaluation;Energy conservation;Activityengagement   Goal Expiration Date 03/05/24   OT Treatment Day 0   OT Frequency 2-3x/wk   Discharge Recommendation   Rehab Resource Intensity Level, OT I (Maximum Resource Intensity)   AM-PAC Daily Activity Inpatient   Lower Body Dressing 2   Bathing 2   Toileting 3   Upper Body Dressing 3   Grooming 3   Eating 4   Daily Activity Raw Score 17   Daily Activity Standardized Score (Calc for Raw Score >=11) 37.26   AM-PAC Applied Cognition Inpatient   Following a Speech/Presentation 3   Understanding Ordinary Conversation 4   Taking Medications 3   Remembering Where Things Are Placed or Put Away 3   Remembering List of 4-5 Errands 3   Taking Care of Complicated Tasks 3   Applied Cognition Raw Score 19   Applied Cognition Standardized Score 39.77   End of Consult   Education Provided Yes   Patient Position at End of Consult Bedside chair;Bed/Chair alarm activated;All needs within reach   Nurse Communication Nurse aware of consult       Occupational Therapy Goals    Pt will be Mod I with bed mobility with good sitting balance/tolerance to engage in self care tasks within this plan of care.      Pt will be Mod I for UB dressing and bathing with use of assistive devices PRN within this plan of care.     Pt will be Mod I for LB dressing and bathing with use of assistive devices PRN within this plan of care.     Pt will demonstrate standing tolerance of 2-3 minutes increase  independence for toileting ADLs/tasks with use of assistive devices PRN within this plan of care.     Pt will demonstrate good carryover of safety awareness with use of RW during functional tasks within this plan of care.     Pt will demonstrated increased activity tolerance to 30 mins for participation in ADLs and functional tasks within this plan of care.     Pt will complete functional cognitive assessment with good attention/participation to assist with safe d/c planning recommendations.        MARTIN eBaver, OTR/L

## 2024-02-20 NOTE — PROGRESS NOTES
[facility]  Progress Note  Name: Lona Cedeno I  MRN: [id number]  Unit/Bed#: [facility] 815-01 I Date of Admission: 2/13/2024   Date of Service: 2/20/2024 I Hospital Day: 7    Assessment/Plan   * Surgical site infection  Assessment & Plan  Continue cefepime, vancomycin DC'd 2/16/2024  MRSA screen negative  Right groin wound breakdown status post right groin washout, VAC placement on 2/14 and 2/19  Infectious disease following-abx as per them- cefepime  Cultures + klebsiella and proteus, anaerobic cultures negative growth thus far    Other dietary vitamin B12 deficiency anemia  Assessment & Plan  B12 level 93  pending MMA   S/p b12 injection 2/17- give additional 2 doses to complete 3 doses total   Started PO supplements as well     Sepsis without acute organ dysfunction (HCC)  Assessment & Plan  Fever/leukocytosis at [facility] prior to arrival here, additionally with lactic acidosis resolved following IV fluids  Concern for surgical site infection at groin, s/p waschout with vac placement on 2/14 with vascular surgery   Received cefepime/vancomycin at [facility] prior to transfer to medical floor-currently on cefepime, vancomycin DC'd 2/16/2024  ID consult appreciated  Blood cultures NGTD  Covid/flu/rsv negative   Wound culture 2/14- (+) Klebsiella and Proteus growing  Anaerobic culture 2/14 no growth thus far  Urine culture-prelim> 100,000 enterobacter cloacae noted to be resistant to cefepime- likely secondary to colonization/asymptomatic bacteruria     Diabetic foot ulcer with osteomyelitis (HCC)  Assessment & Plan  Podiatry assistance appreciated  Wound vac MWF       Tobacco use disorder  Assessment & Plan  Continue with nicotine patch  Encourage cessation    COPD, severity to be determined ([facility])  Assessment & Plan  Respiratory status is stable  Continue maintenance inhalers and PRN bronchodilators    PAD (peripheral artery disease) ([facility])  Assessment & Plan  1/31 BYPASS AXILLO-FEMORAL, RIGHT  WITH 8MM RINGED PTFE GRAFT   Continue aspirin, statin, xarelto 2.5 mg bid     Essential hypertension  Assessment & Plan  Hypotension noted post op- now resolved   BP stable  C/w holding amiloride, can restart later is indicated    Type 2 diabetes mellitus with diabetic polyneuropathy (HCC)  Assessment & Plan  Lab Results   Component Value Date    HGBA1C 6.5 (H) 11/07/2023       Recent Labs     02/19/24  1559 02/19/24  2128 02/20/24  0746 02/20/24  1057   POCGLU 137 232* 152* 186*         Blood Sugar Average: Last 72 hrs:  (P) [id number]  hold metformin while inpatient  Diabetic diet  Fingersticks QID with sliding scale coverage               VTE Pharmacologic Prophylaxis: VTE Score: 3 Moderate Risk (Score 3-4) - Pharmacological DVT Prophylaxis Ordered: rivaroxaban (Xarelto).    Mobility:   Basic Mobility Inpatient Raw Score: 12  JH-HLM Goal: 4: Move to chair/commode  JH-HLM Achieved: 4: Move to chair/commode  HLM Goal achieved. Continue to encourage appropriate mobility.    Patient Centered Rounds: I performed bedside rounds with nursing staff today.   Discussions with Specialists or Other Care Team Provider: primary RN    Education and Discussions with Family / Patient: Patient declined call to .     Total Time Spent on Date of Encounter in care of patient: 25 mins. This time was spent on one or more of the following: performing physical exam; counseling and coordination of care; obtaining or reviewing history; documenting in the medical record; reviewing/ordering tests, medications or procedures; communicating with other healthcare professionals and discussing with patient's family/caregivers.    Current Length of Stay: 7 day(s)  Current Patient Status: Inpatient   Certification Statement: The patient will continue to require additional inpatient hospital stay due to IV antibiotics, awaiting VAC change tomorrow with vascular surgery, awaiting vascular surgery clearance   Discharge Plan:  Anticipate discharge in 24-48 hrs to rehab facility.    Code Status: Level 3 - DNAR and DNI    Subjective:   Patient assessed at bedside.  No complaints.    Objective:     Vitals:   Temp (24hrs), Av.3 °F (36.8 °C), Min:96.9 °F (36.1 °C), Max:99.2 °F (37.3 °C)    Temp:  [96.9 °F (36.1 °C)-99.2 °F (37.3 °C)] 99.2 °F (37.3 °C)  HR:  [70-80] 71  Resp:  [12-16] 16  BP: (105-138)/(52-73) 119/52  SpO2:  [91 %-95 %] 92 %  Body mass index is 28.7 kg/m².     Input and Output Summary (last 24 hours):     Intake/Output Summary (Last 24 hours) at 2024 1618  Last data filed at 2024 1424  Gross per 24 hour   Intake 1214.33 ml   Output 1600 ml   Net -385.67 ml       Physical Exam:   Physical Exam  Vitals reviewed.   Constitutional:       General: She is not in acute distress.     Appearance: Normal appearance. She is not ill-appearing.   HENT:      Head: Normocephalic and atraumatic.   Cardiovascular:      Rate and Rhythm: Normal rate and regular rhythm.   Pulmonary:      Effort: Pulmonary effort is normal. No respiratory distress.   Abdominal:      General: Bowel sounds are normal.      Tenderness: There is no abdominal tenderness.   Skin:     General: Skin is warm and dry.      Comments: Right groin VAC intact   Neurological:      Mental Status: She is alert and oriented to person, place, and time. Mental status is at baseline.          Additional Data:     Labs:  Results from last 7 days   Lab Units 24  0603 24  0509 02/15/24  0602   WBC Thousand/uL 8.55   < > 12.41*   HEMOGLOBIN g/dL 10.9*   < > 9.8*   HEMATOCRIT % 35.3   < > 31.8*   PLATELETS Thousands/uL 372   < > 347   NEUTROS PCT %  --   --  87*   LYMPHS PCT %  --   --  9*   MONOS PCT %  --   --  3*   EOS PCT %  --   --  0    < > = values in this interval not displayed.     Results from last 7 days   Lab Units 24  0603   SODIUM mmol/L 143   POTASSIUM mmol/L 4.0   CHLORIDE mmol/L 104   CO2 mmol/L 32   BUN mg/dL 9   CREATININE mg/dL 0.53*    ANION GAP mmol/L 7   CALCIUM mg/dL 9.8   GLUCOSE RANDOM mg/dL 152*     Results from last 7 days   Lab Units 02/14/24  0446   INR  1.16     Results from last 7 days   Lab Units 02/20/24  1057 02/20/24  0746 02/19/24  2128 02/19/24  1559 02/19/24  1130 02/19/24  0951 02/19/24  0656 02/18/24  2053 02/18/24  1613 02/18/24  1102 02/18/24  0704 02/17/24  2110   POC GLUCOSE mg/dl 186* 152* 232* 137 131 149* 172* 195* 119 211* 167* 161*         Results from last 7 days   Lab Units 02/15/24  0602 02/14/24  0446 02/13/24 2129 02/13/24  1750   LACTIC ACID mmol/L  --   --  1.7 2.3*   PROCALCITONIN ng/ml 0.39* 0.52*  --   --        Lines/Drains:  Invasive Devices       Peripheral Intravenous Line  Duration             Peripheral IV 02/16/24 Right Forearm 3 days              Drain  Duration             Ureteral Internal Stent Left ureter 6 Fr. 92 days                          Imaging: Reviewed radiology reports from this admission including: chest xray    Recent Cultures (last 7 days):   Results from last 7 days   Lab Units 02/14/24  1539 02/14/24  1534 02/13/24  1920 02/13/24  1750   BLOOD CULTURE   --   --   --  No Growth After 5 Days.  No Growth After 5 Days.   GRAM STAIN RESULT  1+ Disintegrating polys*  1+ Gram positive rods* No Polys or Bacteria seen  --   --    URINE CULTURE   --   --  >100,000 cfu/ml Enterobacter cloacae*  --    WOUND CULTURE  1+ Growth of Klebsiella pneumoniae*  1+ Growth of Proteus vulgaris group*  Few Colonies of  --   --   --        Last 24 Hours Medication List:   Current Facility-Administered Medications   Medication Dose Route Frequency Provider Last Rate    acetaminophen  975 mg Oral Q8H IJLLIAN Calogero Bhaskar, DO      albuterol  2 puff Inhalation Q6H PRN Calogero Bhaskar, DO      aspirin  81 mg Oral Daily Calogero Bhaskar, DO      atorvastatin  10 mg Oral Daily With Dinner Calogero Bhaskar, DO      bisacodyl  10 mg Rectal Daily PRN Calogero Bhaskar, DO      buPROPion  300 mg Oral Daily  Calogero Bhaskar, DO      cefepime  1,000 mg Intravenous Q12H Calogero Bhaskar, DO 1,000 mg (02/20/24 5929)    clonazePAM  1 mg Oral QPM Calogero Bhaskar, DO      vitamin B-12  1,000 mcg Oral Daily Calogero Bhaskar, DO      [Transfer Hold] cyanocobalamin  1,000 mcg Intramuscular Daily ROXANE Munoz      docusate sodium  100 mg Oral BID Calogero Bhaskar, DO      fish oil  1,000 mg Oral Daily Calogero Bhaskar, DO      fluticasone-vilanterol  1 puff Inhalation Daily Calogero Bhaskar, DO      insulin lispro  1-5 Units Subcutaneous TID With Meals Calogero Bhaskar, DO      lactated ringers  100 mL/hr Intravenous Continuous Shaneka Cota, CRNA Stopped (02/20/24 0809)    methocarbamol  250 mg Oral Q8H JILLIAN Calogero Bhaskar, DO      multivitamin-minerals  1 tablet Oral Daily Calogero Bhaskar, DO      nicotine  1 patch Transdermal Daily Calogero Bhaskar, DO      ondansetron  4 mg Intravenous Q6H PRN Calogero Bhaskar, DO      oxybutynin  5 mg Oral Daily Calogero Bhaskar, DO      oxyCODONE  2.5 mg Oral Q4H PRN Calogero Bhaskar, DO      Or    oxyCODONE  5 mg Oral Q4H PRN Calogero Bhaskar, DO      pantoprazole  40 mg Oral Early Morning Calogero Bhaskar, DO      polyethylene glycol  17 g Oral Daily Calogero Bhaskar, DO      pregabalin  200 mg Oral TID Calogero Bhaskar, DO      rivaroxaban  2.5 mg Oral BID With Meals Calogero Bhaskar, DO      senna  2 tablet Oral Daily PRN Calogero Bhaskar, DO          Today, Patient Was Seen By: Clara Gann DO    **Please Note: This note may have been constructed using a voice recognition system.**

## 2024-02-20 NOTE — ASSESSMENT & PLAN NOTE
Lab Results   Component Value Date    HGBA1C 6.5 (H) 11/07/2023       Recent Labs     02/19/24  1559 02/19/24  2128 02/20/24  0746 02/20/24  1057   POCGLU 137 232* 152* 186*         Blood Sugar Average: Last 72 hrs:  (P) [id number]  hold metformin while inpatient  Diabetic diet  Fingersticks QID with sliding scale coverage

## 2024-02-20 NOTE — ASSESSMENT & PLAN NOTE
79 y/o F w/ history of smoking, HTN, T2DM, GERD, COPD, R ankle fx s/p ORIF '08, AOID with nonhealing R foot wounds S/P Right Ax to Fem PTFE bypass 1/31 and Right chest wall hematoma evacuation, right foot wound and achilles debridement with 2nd toe partial amputation, VAC placement 2/4. Now represents from [facility] with fevers, leukocytosis, encephalopathy with concern for Right groin wound sloughing at incision site.    S/P Right Groin washout VAC change 2/19  S/P Right Groin washout and vac 2/14  S/P Right Axillary I&D, Pods Achilles debridement, partial 2nd to amp, Hematoma evac 2/4  S/P Right Ax to Fem PTFE bypass 1/31     Plan:  Bedside VAC change Wednesday 2/21  Continue Xarelto 2.5mg BID   Continue ASA, statin  ID following, ABX- Cefepime, WCx-Klebsiella/Proteus, UCx-Enterobacter; long-term plan pending  Podiatry for Right foot wound managment

## 2024-02-20 NOTE — PLAN OF CARE
Problem: OCCUPATIONAL THERAPY ADULT  Goal: Performs self-care activities at highest level of function for planned discharge setting.  See evaluation for individualized goals.  Description: Treatment Interventions: ADL retraining, Functional transfer training, Endurance training, Cognitive reorientation, Patient/family training, Compensatory technique education, Continued evaluation, Energy conservation, Activityengagement          See flowsheet documentation for full assessment, interventions and recommendations.   Note: Limitation: Decreased ADL status, Decreased Safe judgement during ADL, Decreased cognition, Decreased endurance, Decreased self-care trans, Decreased high-level ADLs  Prognosis: Good  Assessment: 78 year old pt seen today for an OT evaluation following admission to [facility] 2/2 surgical site infection with present symptoms significant for R LE pain, weakness, fatigue, decreased ADL status, decreased functional mobility. Pt has a past medical history of Anxiety, Closed fracture of coccyx (HCC), Diabetes mellitus (HCC), GERD (gastroesophageal reflux disease), Hyperlipidemia, Hypertension, IBS (irritable bowel syndrome), and Shoulder fracture. Pt with recent admission where she was d/c to [facility], now returning 2/2 infection. Pt reported that she lives with her spouse and daughter in a 1SH, 5+5 PATO with [facility]. Pt reported being IND with all ADLs/IADLs PTA with SPC vs rollator used for functional mobility. 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 +. Pt pleasant and cooperative t/o session, requiring VCs for safety awareness and maintaining WBS of NWB to RLE. Pt completed functional bed mobility with SUP. Min Ax2 for functional STS txfs with bilateral HHA. Min Ax2 for functional mobility with b/l HHA. Pt was min A for UB ADLs and Mod A for LB ADLs. The patient's raw score on the AM-PAC Daily  Activity Inpatient Short Form is 17. A raw score of less than 19 suggests the patient may benefit from discharge to post-acute rehabilitation services. Please refer to the recommendation of the Occupational Therapist for safe discharge planning. Pt will continue to benefit from skilled acute OT to promote increased independence and return to PLOF. At this time, current OT recommendation is Level 1 resources.     Rehab Resource Intensity Level, OT: I (Maximum Resource Intensity)

## 2024-02-20 NOTE — PHYSICAL THERAPY NOTE
Physical Therapy Evaluation     Patient's Name: Lona Cedeno    Admitting Diagnosis  Diabetic foot ulcer with osteomyelitis ([facility]) [E11.621, E11.69, L97.509, M86.9]    Problem List  Patient Active Problem List   Diagnosis    Type 2 diabetes mellitus with diabetic polyneuropathy (HCC)    Essential hypertension    Anxiety and depression    Medicare annual wellness visit, subsequent    GERD without esophagitis    Urinary incontinence    Degenerative lumbar disc    Lactic acidosis    Hypomagnesemia    Bladder neoplasm    Left renal mass    PAD (peripheral artery disease) (HCC)    Abnormal CT of the chest    COPD, severity to be determined (HCC)    Tobacco use disorder    Age-related osteoporosis without current pathological fracture    Ankle ulcer, right, with fat layer exposed (HCC)    Diabetic foot ulcer with osteomyelitis (HCC)    Preoperative cardiovascular examination    Constipation    Hematoma    At risk for venous thromboembolism (VTE)    At high risk for skin breakdown    Wound of skin    Impaired mobility and activities of daily living    Acute pain due to injury    Leukocytosis    Surgical site infection    Sepsis without acute organ dysfunction (HCC)    Other dietary vitamin B12 deficiency anemia       Past Medical History  Past Medical History:   Diagnosis Date    Anxiety     Closed fracture of coccyx ([facility]) 2023    Diabetes mellitus (HCC)     GERD (gastroesophageal reflux disease)     Hyperlipidemia     Hypertension     IBS (irritable bowel syndrome)     Shoulder fracture        Past Surgical History  Past Surgical History:   Procedure Laterality Date    ANKLE FRACTURE SURGERY Right     has screw in place     SECTION      x2    CHOLECYSTECTOMY      CYSTOSCOPY W/ LASER LITHOTRIPSY Left 2023    Procedure: CYSTOSCOPY; RETROGRADE; URETEROSCOPY; BIOPSY; LEFT -INSERTION OF STENT;  Surgeon: Cristian Child MD;  Location: [facility];  Service: Urology    CYSTOSCOPY W/ LASER LITHOTRIPSY Left  11/20/2023    Procedure: CYSTOSCOPY; RETROGRADE; URETEROSCOPY; 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;  Surgeon: Cristian Child MD;  Location: [facility];  Service: Urology    EVACUATION OF HEMATOMA Right 2/4/2024    Procedure: EVACUATION/ DRAINAGE CHEST WALL  HEMATOMA;  Surgeon: Seth Hoyos MD;  Location: [facility];  Service: Vascular    FL RETROGRADE PYELOGRAM  9/18/2023    HERNIA REPAIR      umbilicus w/ mesh    IL BYPASS W/VEIN AXILLARY-FEMORAL Right 1/31/2024    Procedure: BYPASS AXILLO-FEMORAL, RIGHT WITH 8MM RINGED PTFE GRAFT;  Surgeon: Seth Hoyos MD;  [facility];  Service: Vascular    WOUND DEBRIDEMENT Right 2/4/2024    Procedure: RIGHT WOUND AND ACHILLES DEBRIDEMENT FOOT/TOE (WASH OUT); PARTIAL AMPUTATION DISTAL 2ND TOE;  Surgeon: Aaron Montero DPM;  [facility]: [facility];  Service: Podiatry    WOUND DEBRIDEMENT Right 2/14/2024    Procedure: DEBRIDEMENT RIGHT GROIN  WOUND AND WASHOUT, APPLICATION OF WOUND VAC;  Surgeon: Suly Muro DO;  [facility];  Service: Vascular    WOUND DEBRIDEMENT Right 2/19/2024    Procedure: DEBRIDEMENT LOWER EXTREMITY, washout;  Surgeon: Suly Muro DO;  [facility];  Service: Vascular            02/20/24 1445   PT Last Visit   PT Visit Date 02/20/24   Note Type   Note type Evaluation   Pain Assessment   Pain Assessment Tool 0-10   Pain Location/Orientation Orientation: Right;Location: Leg   Restrictions/Precautions   Weight Bearing Precautions Per Order Yes   RLE Weight Bearing Per Order (S)  NWB  (Per Podiatry note)   Braces or Orthoses Other (Comment)  (Prevalon boot)   Other Precautions Chair Alarm;Bed Alarm;Cognitive;WBS;Multiple lines;Fall Risk;Pain  (2 wound vacs- R groin + R ankle)   Home Living   Type of Home House   Home Layout One level;Stairs to enter with rails  (5 + 5 PATO)   Bathroom Shower/Tub Walk-in shower   Bathroom Toilet Raised   Bathroom Equipment Grab bars in shower;Shower  chair   Home Equipment Cane   Prior Function   Level of [place] Independent with ADLs;Independent with functional mobility;Independent with IADLS   Lives With Spouse;Daughter   Receives Help From Family   IADLs Independent with driving;Independent with meal prep;Independent with medication management   Falls in the last 6 months 0   Vocational Retired   Comments 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 Admitted for sepsis and concern for R groin wound breakdown s/p right groin washout, VAC placement 2/14 and 2/19.   Family/Caregiver Present No   Cognition   Overall Cognitive Status Impaired   Arousal/Participation Alert   Attention Attends with cues to redirect   Orientation Level Oriented X4   Following Commands Follows one step commands with increased time or repetition   Comments Pt cooperative during session   Subjective   Subjective Agreeable to mobilize   RLE Assessment   RLE Assessment X  (from observed movements grossly 3/5)   LLE Assessment   LLE Assessment   (grossly 3+-4/5)   Bed Mobility   Supine to Sit 5  Supervision   Additional items Increased time required;Bedrails;Verbal cues   Sit to Supine Unable to assess   Additional Comments Pt in bed upon arrival.   Transfers   Sit to Stand 4  Minimal assistance   Additional items Assist x 2;Increased time required;Verbal cues   Stand to Sit 4  Minimal assistance   Additional items Assist x 2;Increased time required;Verbal cues;Impulsive   Stand pivot 4  Minimal assistance   Additional items Assist x 2;Increased time required;Verbal cues;Impulsive   Additional Comments Pt is NWB R LE, requires VC through session to maintain WB precautions.   Ambulation/Elevation   Gait pattern Not appropriate   Balance   Static Sitting Fair +   Dynamic Sitting Fair   Static Standing Fair  -   Dynamic Standing Poor +   Endurance Deficit   Endurance Deficit Yes   Activity Tolerance   Activity Tolerance Patient limited by fatigue   Medical Staff Made Aware Co-eval with OT 2* medical complexity and multiple co morbidities   Nurse Made Aware RN cleared pt for therapy   Assessment   Prognosis Fair   Problem List Decreased strength;Decreased endurance;Decreased mobility;Pain;Decreased skin integrity;Decreased safety awareness   Assessment Pt is a 78 y.o. female seen for PT evaluation s/p admit to [facility] on 2/13/2024. Pt was admitted with a primary dx of: Surgical site infection, Sepsis , HTN, Diabetic foot ulcer.  Pt with  Nonhealing R foot wounds s/p right ax-fem PTFe bypass 1/31, R chest wall evac and R foot wound/achilles debridement, 2nd toe partial amp, VAC 2/4. Admitted for sepsis and concern for R groin wound breakdown s/p right groin washout, VAC placement 2/14 and 2/19.  PT now consulted for assessment of mobility and d/c needs. Pt with Up as tolerated orders, NWB R LE.  Pts current clinical presentation is Unstable/ Unpredictable (high complexity) due to Ongoing medical management for primary dx, Decreased activity tolerance compared to baseline, Fall risk, Increased assistance needed from caregiver at current time, Current WBS. Prior to admission, pt was Independent for mobility and ADLs, but currently admitted from [facility].. Upon evaluation, pt currently is requiring supervision for bed mobility . Ax 2 for SPS transfer from bed to recliner, with VC to maintain WB precautions on R LE. Pt presents at PT eval functioning below baseline and currently w/ overall mobility deficits 2* to: decreased endurance, pain, decreased activity tolerance compared to baseline, decreased functional mobility tolerance compared to baseline, decreased safety awareness, fall risk, decreased skin integrity. Pt currently at a fall risk 2* to impairments listed above.  Pt will continue to benefit from  skilled acute PT interventions to address stated impairments; to maximize functional mobility; for ongoing pt/ family training; and DME needs. At conclusion of PT session pt returned back in chair, chair alarm engaged, all needs in reach, and RN notified of session findings/recommendations with phone and call bell within reach. Pt denies any further questions at this time. The patient's AM-PAC Basic Mobility Inpatient Short Form Raw Score is 12. A Raw score of less than or equal to 16 suggests the patient may benefit from discharge to post-acute rehabilitation services. Please also refer to the recommendation of the Physical Therapist for safe discharge planning. Recommend Level I upon hospital D/C.   Barriers to Discharge Decreased caregiver support;Inaccessible home environment   Goals   Patient Goals to get up   STG Expiration Date 03/05/24   Short Term Goal #1 STG 1. Pt will be able to perform bed mobility tasks with Mod I in order to improve overall functional mobility and assist in safe d/c. STG 2. Pt with sit EOB for at least 25 minutes at Ind level in order to strengthen abdominal musculature and assist in future transfers/ ambulation. STG 3. Pt will be able to perform functional transfer with Min A in order to improve overall functional mobility and assist in safe d/c. STG 4. Pt will be able to ambulate at least 10  feet with least restrictive device with Min  A in order to improve overall functional mobility and assist in safe d/c. STG 5. Pt will improve sitting/standing static/dynamic balance 1/2 grade in order to improve functional mobility and assist in safe d/c. STG 6. Pt will improve LE strength by 1/2 grade in order to improve functional mobility and assist in safe d/c. STG 7: Pt will be able to propel w/c on even surfaces and turns with Mod I to improve functional independence.   PT Treatment Day 0   Plan   Treatment/Interventions Functional transfer training;Therapeutic exercise;Bed mobility;Gait  training;Spoke to nursing;OT   PT Frequency 3-5x/wk   Discharge Recommendation   Rehab Resource Intensity Level, PT I (Maximum Resource Intensity)   Equipment Recommended Wheelchair   AM-PAC Basic Mobility Inpatient   Turning in Flat Bed Without Bedrails 3   Lying on Back to Sitting on Edge of Flat Bed Without Bedrails 3   Moving Bed to Chair 2   Standing Up From Chair Using Arms 2   Walk in Room 1   Climb 3-5 Stairs With Railing 1   Basic Mobility Inpatient Raw Score 12   Basic Mobility Standardized Score 32.23   Highest Level Of Mobility   JH-HLM Goal 4: Move to chair/commode   JH-HLM Achieved 4: Move to chair/commode   Modified McDonough Scale   Modified Mc[place]ough Scale 4   End of Consult   Patient Position at End of Consult All needs within reach;Bed/Chair alarm activated;Bedside chair       Katerine Burk, PT DPT

## 2024-02-20 NOTE — PLAN OF CARE
Problem: PHYSICAL THERAPY ADULT  Goal: Performs mobility at highest level of function for planned discharge setting.  See evaluation for individualized goals.  Description: Treatment/Interventions: Functional transfer training, Therapeutic exercise, Bed mobility, Gait training, Spoke to nursing, OT  Equipment Recommended: Wheelchair       See flowsheet documentation for full assessment, interventions and recommendations.  Note: Prognosis: Fair  Problem List: Decreased strength, Decreased endurance, Decreased mobility, Pain, Decreased skin integrity, Decreased safety awareness  Assessment: Pt is a 78 y.o. female seen for PT evaluation s/p admit to [facility] on 2/13/2024. Pt was admitted with a primary dx of: Surgical site infection, Sepsis , HTN, Diabetic foot ulcer.  Pt with  Nonhealing R foot wounds s/p right ax-fem PTFe bypass 1/31, R chest wall evac and R foot wound/achilles debridement, 2nd toe partial amp, VAC 2/4. Admitted for sepsis and concern for R groin wound breakdown s/p right groin washout, VAC placement 2/14 and 2/19.  PT now consulted for assessment of mobility and d/c needs. Pt with Up as tolerated orders, NWB R LE.  Pts current clinical presentation is Unstable/ Unpredictable (high complexity) due to Ongoing medical management for primary dx, Decreased activity tolerance compared to baseline, Fall risk, Increased assistance needed from caregiver at current time, Current WBS. Prior to admission, pt was Independent for mobility and ADLs, but currently admitted from [facility].. Upon evaluation, pt currently is requiring supervision for bed mobility . Ax 2 for SPS transfer from bed to recliner, with VC to maintain WB precautions on R LE. Pt presents at PT eval functioning below baseline and currently w/ overall mobility deficits 2* to: decreased endurance, pain, decreased activity tolerance compared to baseline, decreased functional mobility tolerance compared to baseline, decreased safety  awareness, fall risk, decreased skin integrity. Pt currently at a fall risk 2* to impairments listed above.  Pt will continue to benefit from skilled acute PT interventions to address stated impairments; to maximize functional mobility; for ongoing pt/ family training; and DME needs. At conclusion of PT session pt returned back in chair, chair alarm engaged, all needs in reach, and RN notified of session findings/recommendations with phone and call bell within reach. Pt denies any further questions at this time. The patient's AM-PAC Basic Mobility Inpatient Short Form Raw Score is 12. A Raw score of less than or equal to 16 suggests the patient may benefit from discharge to post-acute rehabilitation services. Please also refer to the recommendation of the Physical Therapist for safe discharge planning. Recommend Level I upon hospital D/C.  Barriers to Discharge: Decreased caregiver support, Inaccessible home environment     Rehab Resource Intensity Level, PT: I (Maximum Resource Intensity)    See flowsheet documentation for full assessment.

## 2024-02-20 NOTE — PROGRESS NOTES
"[facility]  Progress Note  Name: Lona Cedeno I  MRN: [id number]  Unit/Bed#: [facility] 815-01 I Date of Admission: 2/13/2024   Date of Service: 2/20/2024 I Hospital Day: 7    Assessment/Plan   PAD (peripheral artery disease) ([facility])  Assessment & Plan  77 y/o F w/ history of smoking, HTN, T2DM, GERD, COPD, R ankle fx s/p ORIF '08, AOID with nonhealing R foot wounds S/P Right Ax to Fem PTFE bypass 1/31 and Right chest wall hematoma evacuation, right foot wound and achilles debridement with 2nd toe partial amputation, VAC placement 2/4. Now represents from [facility] with fevers, leukocytosis, encephalopathy with concern for Right groin wound sloughing at incision site.    S/P Right Groin washout VAC change 2/19  S/P Right Groin washout and vac 2/14  S/P Right Axillary I&D, Pods Achilles debridement, partial 2nd to amp, Hematoma evac 2/4  S/P Right Ax to Fem PTFE bypass 1/31     Plan:  Bedside VAC change Wednesday 2/21  Continue Xarelto 2.5mg BID   Continue ASA, statin  ID following, ABX- Cefepime, WCx-Klebsiella/Proteus, UCx-Enterobacter; long-term plan pending  Podiatry for Right foot wound managment           Subjective:  Pt doing well    Vitals:  /59   Pulse 80   Temp 98.8 °F (37.1 °C)   Resp 12   Ht 5' 1\" (1.549 m)   Wt 68.9 kg (151 lb 14.4 oz)   SpO2 92%   BMI 28.70 kg/m²     I/Os:  I/O last 3 completed shifts:  In: 980 [P.O.:780; I.V.:200]  Out: 2900 [Urine:2900]  I/O this shift:  In: 70 [I.V.:70]  Out: 800 [Urine:800]    Lab Results and Cultures:   Lab Results   Component Value Date    WBC 8.55 02/19/2024    HGB 10.9 (L) 02/19/2024    HCT 35.3 02/19/2024     (H) 02/19/2024     02/19/2024     Lab Results   Component Value Date    GLUCOSE 143 (H) 01/31/2024    CALCIUM 9.8 02/19/2024     05/21/2018    K 4.0 02/19/2024    CO2 32 02/19/2024     02/19/2024    BUN 9 02/19/2024    CREATININE 0.53 (L) 02/19/2024     Lab Results   Component Value Date    " INR 1.16 02/14/2024    INR 1.03 02/04/2024    INR 1.11 02/03/2024    PROTIME 14.7 (H) 02/14/2024    PROTIME 13.4 02/04/2024    PROTIME 14.2 02/03/2024        Blood Culture:   Lab Results   Component Value Date    BLOODCX No Growth After 5 Days. 02/13/2024    BLOODCX No Growth After 5 Days. 02/13/2024   ,   Urinalysis:   Lab Results   Component Value Date    COLORU Light Yellow 02/14/2024    COLORU YELLOW 05/21/2018    CLARITYU Clear 02/14/2024    CLARITYU CLEAR 05/21/2018    SPECGRAV 1.009 02/14/2024    SPECGRAV 1.015 05/21/2018    PHUR 5.5 02/14/2024    PHUR 6.0 05/21/2018    LEUKOCYTESUR Large (A) 02/14/2024    LEUKOCYTESUR NEGATIVE 05/21/2018    NITRITE Negative 02/14/2024    NITRITE NEGATIVE 05/21/2018    PROTEINUA NEGATIVE 05/21/2018    GLUCOSEU Negative 02/14/2024    GLUCOSEU NEGATIVE 05/21/2018    KETONESU Negative 02/14/2024    KETONESU NEGATIVE 05/21/2018    BILIRUBINUR Negative 02/14/2024    BILIRUBINUR NEGATIVE 05/21/2018    BLOODU Negative 02/14/2024    BLOODU NEGATIVE 05/21/2018   ,   Urine Culture:   Lab Results   Component Value Date    URINECX >100,000 cfu/ml Enterobacter cloacae (A) 02/13/2024   ,   Wound Culure:   Lab Results   Component Value Date    WOUNDCULT 1+ Growth of Klebsiella pneumoniae (A) 02/14/2024    WOUNDCULT 1+ Growth of Proteus vulgaris group (A) 02/14/2024    WOUNDCULT Few Colonies of 02/14/2024       Medications:  Current Facility-Administered Medications   Medication Dose Route Frequency    acetaminophen (TYLENOL) tablet 975 mg  975 mg Oral Q8H JILLIAN    albuterol (PROVENTIL HFA,VENTOLIN HFA) inhaler 2 puff  2 puff Inhalation Q6H PRN    aspirin (ECOTRIN LOW STRENGTH) EC tablet 81 mg  81 mg Oral Daily    atorvastatin (LIPITOR) tablet 10 mg  10 mg Oral Daily With Dinner    bisacodyl (DULCOLAX) rectal suppository 10 mg  10 mg Rectal Daily PRN    buPROPion (WELLBUTRIN XL) 24 hr tablet 300 mg  300 mg Oral Daily    cefepime (MAXIPIME) 1,000 mg in dextrose 5 % 50 mL IVPB  1,000 mg  Intravenous Q12H    clonazePAM (KlonoPIN) tablet 1 mg  1 mg Oral QPM    cyanocobalamin (VITAMIN B-12) tablet 1,000 mcg  1,000 mcg Oral Daily    [Transfer Hold] cyanocobalamin injection 1,000 mcg  1,000 mcg Intramuscular Daily    docusate sodium (COLACE) capsule 100 mg  100 mg Oral BID    fish oil capsule 1,000 mg  1,000 mg Oral Daily    fluticasone-vilanterol 200-25 mcg/actuation 1 puff  1 puff Inhalation Daily    insulin lispro (HumaLOG) 100 units/mL subcutaneous injection 1-5 Units  1-5 Units Subcutaneous TID With Meals    lactated ringers infusion  100 mL/hr Intravenous Continuous    methocarbamol (ROBAXIN) tablet 250 mg  250 mg Oral Q8H JILLIAN    multivitamin-minerals (CENTRUM) tablet 1 tablet  1 tablet Oral Daily    nicotine (NICODERM CQ) 14 mg/24hr TD 24 hr patch 1 patch  1 patch Transdermal Daily    ondansetron (ZOFRAN) injection 4 mg  4 mg Intravenous Q6H PRN    oxybutynin (DITROPAN-XL) 24 hr tablet 5 mg  5 mg Oral Daily    oxyCODONE (ROXICODONE) split tablet 2.5 mg  2.5 mg Oral Q4H PRN    Or    oxyCODONE (ROXICODONE) IR tablet 5 mg  5 mg Oral Q4H PRN    pantoprazole (PROTONIX) EC tablet 40 mg  40 mg Oral Early Morning    polyethylene glycol (MIRALAX) packet 17 g  17 g Oral Daily    pregabalin (LYRICA) capsule 200 mg  200 mg Oral TID    rivaroxaban (XARELTO) tablet 2.5 mg  2.5 mg Oral BID With Meals    senna (SENOKOT) tablet 17.2 mg  2 tablet Oral Daily PRN         Physical Exam:    General appearance: alert and oriented, in no acute distress  Lungs: clear to auscultation bilaterally  Heart: S1, S2 normal  Abdomen: soft, non-tender; bowel sounds normal; no masses,  no organomegaly  Extremities:  Warm, pink, M/S intact. Right foot dressing CDI.    Wound/Incision:  Right chest wall, counter incision clean, dry, and intact  Right groin VAC intact     Pulse exam:  DP: Right: doppler signal       Shaneka Ortega PA-C  2/20/2024

## 2024-02-20 NOTE — ASSESSMENT & PLAN NOTE
Hypotension noted post op- now resolved   BP stable  C/w holding amiloride, can restart later is indicated

## 2024-02-20 NOTE — ASSESSMENT & PLAN NOTE
Fever/leukocytosis at [facility] prior to arrival here, additionally with lactic acidosis resolved following IV fluids  Concern for surgical site infection at groin, s/p waschout with vac placement on 2/14 with vascular surgery   Received cefepime/vancomycin at [facility] prior to transfer to medical floor-currently on cefepime, vancomycin DC'd 2/16/2024  ID consult appreciated  Blood cultures NGTD  Covid/flu/rsv negative   Wound culture 2/14- (+) Klebsiella and Proteus growing  Anaerobic culture 2/14 no growth thus far  Urine culture-prelim> 100,000 enterobacter cloacae noted to be resistant to cefepime- likely secondary to colonization/asymptomatic bacteruria

## 2024-02-20 NOTE — QUICK NOTE
"Post Op Check Note - Vascular Surgery   Lona Cedeno 78 y.o. female MRN: [id number]  Unit/Bed#: [facility] 815-01 Encounter: [id number]    ASSESSMENT:  Lona Cedeno is a 78 y.o. female who is status post Rt groin washout and vac.     Subjective: Patient seen and evaluated at bedside. Denies any acute complaints of pain. Denies any fevers, chills or shortness of breath.     Physical Exam:  GEN: NAD  CV: RRR  Lung: Normal effort  Ab: Soft, NT/ND  Neuro: A+Ox3  Incisions: Clean, dry and intact. Vac with appropriate seal.   Pulses: Rt Dop DP, dop BPG, Palp Pop.      Blood pressure 105/58, pulse 70, temperature 98.2 °F (36.8 °C), resp. rate 12, height 5' 1\" (1.549 m), weight 68.9 kg (151 lb 14.4 oz), SpO2 91%.,Body mass index is 28.7 kg/m².      Intake/Output Summary (Last 24 hours) at 2/19/2024 2304  Last data filed at 2/19/2024 1733  Gross per 24 hour   Intake 800 ml   Output 750 ml   Net 50 ml       Invasive Devices       Peripheral Intravenous Line  Duration             Peripheral IV 02/16/24 Right Forearm 3 days              Drain  Duration             Ureteral Internal Stent Left ureter 6 Fr. 91 days                    VTE Pharmacologic Prophylaxis: Heparin            "

## 2024-02-20 NOTE — CASE MANAGEMENT
Case Management Discharge Planning Note    Patient name Lona Cedeno  Location [facility] 815/[facility] 815-01 MRN [id number]  : 1945 Date 2024       Current Admission Date: 2024  Current Admission Diagnosis:Surgical site infection   Patient Active Problem List    Diagnosis Date Noted    Other dietary vitamin B12 deficiency anemia 2024    Leukocytosis 2024    Surgical site infection 2024    Sepsis without acute organ dysfunction (HCC) 2024    At risk for venous thromboembolism (VTE) 2024    At high risk for skin breakdown 2024    Wound of skin 2024    Impaired mobility and activities of daily living 2024    Acute pain due to injury 2024    Hematoma 2024    Constipation 2024    Preoperative cardiovascular examination 2024    Diabetic foot ulcer with osteomyelitis (HCC) 2024    Ankle ulcer, right, with fat layer exposed ([facility]) 2023    Age-related osteoporosis without current pathological fracture 2023    Abnormal CT of the chest 10/17/2023    COPD, severity to be determined ([facility]) 10/17/2023    Tobacco use disorder 10/17/2023    PAD (peripheral artery disease) ([facility]) 10/10/2023    Bladder neoplasm 2023    Left renal mass 2023    Lactic acidosis 2023    Hypomagnesemia 2023    Degenerative lumbar disc 2023    Urinary incontinence 2023    GERD without esophagitis 10/16/2019    Medicare annual wellness visit, subsequent 2019    Essential hypertension 02/15/2019    Anxiety and depression 02/15/2019    Type 2 diabetes mellitus with diabetic polyneuropathy (HCC) 2019      LOS (days): 7  Geometric Mean LOS (GMLOS) (days): 5.1  Days to GMLOS:-1.5     OBJECTIVE:  Risk of Unplanned Readmission Score: 21.56         Current admission status: Inpatient   Preferred Pharmacy:   [id number] - [place] - [address]  [address] [address]  Phone:  [phone] Fax: [phone]    [facility] [place], [place] - [address]  [address]  [place] [address]  Phone: [phone] Fax: [phone]    Primary Care Provider: Vivek Preston DO    Primary Insurance: [employer]  REP  Secondary Insurance:     DISCHARGE DETAILS:                          Additional Comments: Patient not yet clinically ready for discharge, wound vac change tomorrow, awaiting PT/OT evaluations and will then require insurance authorization.

## 2024-02-21 PROBLEM — Z00.00 MEDICARE ANNUAL WELLNESS VISIT, SUBSEQUENT: Status: RESOLVED | Noted: 2019-09-23 | Resolved: 2024-02-21

## 2024-02-21 LAB
ANION GAP SERPL CALCULATED.3IONS-SCNC: 7 MMOL/L
BASOPHILS # BLD AUTO: 0.05 THOUSANDS/ÂΜL (ref 0–0.1)
BASOPHILS NFR BLD AUTO: 1 % (ref 0–1)
BUN SERPL-MCNC: 10 MG/DL (ref 5–25)
CALCIUM SERPL-MCNC: 9.7 MG/DL (ref 8.4–10.2)
CHLORIDE SERPL-SCNC: 103 MMOL/L (ref 96–108)
CO2 SERPL-SCNC: 32 MMOL/L (ref 21–32)
CREAT SERPL-MCNC: 0.55 MG/DL (ref 0.6–1.3)
EOSINOPHIL # BLD AUTO: 0.89 THOUSAND/ÂΜL (ref 0–0.61)
EOSINOPHIL NFR BLD AUTO: 9 % (ref 0–6)
ERYTHROCYTE [DISTWIDTH] IN BLOOD BY AUTOMATED COUNT: 15.6 % (ref 11.6–15.1)
GFR SERPL CREATININE-BSD FRML MDRD: 90 ML/MIN/1.73SQ M
GLUCOSE SERPL-MCNC: 141 MG/DL (ref 65–140)
GLUCOSE SERPL-MCNC: 153 MG/DL (ref 65–140)
GLUCOSE SERPL-MCNC: 154 MG/DL (ref 65–140)
GLUCOSE SERPL-MCNC: 161 MG/DL (ref 65–140)
GLUCOSE SERPL-MCNC: 217 MG/DL (ref 65–140)
HCT VFR BLD AUTO: 34.4 % (ref 34.8–46.1)
HGB BLD-MCNC: 11 G/DL (ref 11.5–15.4)
IMM GRANULOCYTES # BLD AUTO: 0.03 THOUSAND/UL (ref 0–0.2)
IMM GRANULOCYTES NFR BLD AUTO: 0 % (ref 0–2)
LYMPHOCYTES # BLD AUTO: 2.37 THOUSANDS/ÂΜL (ref 0.6–4.47)
LYMPHOCYTES NFR BLD AUTO: 25 % (ref 14–44)
MCH RBC QN AUTO: 31.4 PG (ref 26.8–34.3)
MCHC RBC AUTO-ENTMCNC: 32 G/DL (ref 31.4–37.4)
MCV RBC AUTO: 98 FL (ref 82–98)
MONOCYTES # BLD AUTO: 0.53 THOUSAND/ÂΜL (ref 0.17–1.22)
MONOCYTES NFR BLD AUTO: 6 % (ref 4–12)
NEUTROPHILS # BLD AUTO: 5.78 THOUSANDS/ÂΜL (ref 1.85–7.62)
NEUTS SEG NFR BLD AUTO: 59 % (ref 43–75)
NRBC BLD AUTO-RTO: 0 /100 WBCS
PLATELET # BLD AUTO: 343 THOUSANDS/UL (ref 149–390)
PMV BLD AUTO: 9.1 FL (ref 8.9–12.7)
POTASSIUM SERPL-SCNC: 4.1 MMOL/L (ref 3.5–5.3)
RBC # BLD AUTO: 3.5 MILLION/UL (ref 3.81–5.12)
SODIUM SERPL-SCNC: 142 MMOL/L (ref 135–147)
WBC # BLD AUTO: 9.65 THOUSAND/UL (ref 4.31–10.16)

## 2024-02-21 PROCEDURE — 80048 BASIC METABOLIC PNL TOTAL CA: CPT | Performed by: STUDENT IN AN ORGANIZED HEALTH CARE EDUCATION/TRAINING PROGRAM

## 2024-02-21 PROCEDURE — 99232 SBSQ HOSP IP/OBS MODERATE 35: CPT | Performed by: HOSPITALIST

## 2024-02-21 PROCEDURE — 99233 SBSQ HOSP IP/OBS HIGH 50: CPT | Performed by: INTERNAL MEDICINE

## 2024-02-21 PROCEDURE — 99231 SBSQ HOSP IP/OBS SF/LOW 25: CPT | Performed by: PODIATRIST

## 2024-02-21 PROCEDURE — 82948 REAGENT STRIP/BLOOD GLUCOSE: CPT

## 2024-02-21 PROCEDURE — 97605 NEG PRS WND THER DME<=50SQCM: CPT | Performed by: PODIATRIST

## 2024-02-21 PROCEDURE — 85025 COMPLETE CBC W/AUTO DIFF WBC: CPT | Performed by: STUDENT IN AN ORGANIZED HEALTH CARE EDUCATION/TRAINING PROGRAM

## 2024-02-21 RX ORDER — LACTULOSE 10 G/15ML
20 SOLUTION ORAL 2 TIMES DAILY
Status: COMPLETED | OUTPATIENT
Start: 2024-02-21 | End: 2024-02-21

## 2024-02-21 RX ORDER — AMOXICILLIN 250 MG
1 CAPSULE ORAL 2 TIMES DAILY
Status: DISCONTINUED | OUTPATIENT
Start: 2024-02-21 | End: 2024-02-23 | Stop reason: HOSPADM

## 2024-02-21 RX ORDER — BISACODYL 5 MG/1
5 TABLET, DELAYED RELEASE ORAL DAILY PRN
Status: DISCONTINUED | OUTPATIENT
Start: 2024-02-21 | End: 2024-02-23 | Stop reason: HOSPADM

## 2024-02-21 RX ADMIN — ACETAMINOPHEN 975 MG: 325 TABLET, FILM COATED ORAL at 14:02

## 2024-02-21 RX ADMIN — SENNOSIDES, DOCUSATE SODIUM 1 TABLET: 8.6; 5 TABLET ORAL at 11:41

## 2024-02-21 RX ADMIN — ASPIRIN 81 MG: 81 TABLET, COATED ORAL at 08:15

## 2024-02-21 RX ADMIN — METHOCARBAMOL 250 MG: 500 TABLET ORAL at 14:02

## 2024-02-21 RX ADMIN — RIVAROXABAN 2.5 MG: 2.5 TABLET, FILM COATED ORAL at 07:39

## 2024-02-21 RX ADMIN — ACETAMINOPHEN 975 MG: 325 TABLET, FILM COATED ORAL at 21:58

## 2024-02-21 RX ADMIN — OXYCODONE HYDROCHLORIDE 5 MG: 5 TABLET ORAL at 07:29

## 2024-02-21 RX ADMIN — CYANOCOBALAMIN TAB 500 MCG 1000 MCG: 500 TAB at 08:15

## 2024-02-21 RX ADMIN — ATORVASTATIN CALCIUM 10 MG: 10 TABLET, FILM COATED ORAL at 17:01

## 2024-02-21 RX ADMIN — LACTULOSE 20 G: 20 SOLUTION ORAL at 11:41

## 2024-02-21 RX ADMIN — METHOCARBAMOL 250 MG: 500 TABLET ORAL at 21:58

## 2024-02-21 RX ADMIN — POLYETHYLENE GLYCOL 3350 17 G: 17 POWDER, FOR SOLUTION ORAL at 08:16

## 2024-02-21 RX ADMIN — ACETAMINOPHEN 975 MG: 325 TABLET, FILM COATED ORAL at 07:31

## 2024-02-21 RX ADMIN — LACTULOSE 20 G: 20 SOLUTION ORAL at 17:01

## 2024-02-21 RX ADMIN — PREGABALIN 200 MG: 100 CAPSULE ORAL at 21:58

## 2024-02-21 RX ADMIN — METHOCARBAMOL 250 MG: 500 TABLET ORAL at 08:03

## 2024-02-21 RX ADMIN — INSULIN LISPRO 2 UNITS: 100 INJECTION, SOLUTION INTRAVENOUS; SUBCUTANEOUS at 11:40

## 2024-02-21 RX ADMIN — PANTOPRAZOLE SODIUM 40 MG: 40 TABLET, DELAYED RELEASE ORAL at 07:39

## 2024-02-21 RX ADMIN — NICOTINE 1 PATCH: 14 PATCH, EXTENDED RELEASE TRANSDERMAL at 08:16

## 2024-02-21 RX ADMIN — PREGABALIN 200 MG: 100 CAPSULE ORAL at 08:15

## 2024-02-21 RX ADMIN — INSULIN LISPRO 1 UNITS: 100 INJECTION, SOLUTION INTRAVENOUS; SUBCUTANEOUS at 07:41

## 2024-02-21 RX ADMIN — BUPROPION HYDROCHLORIDE 300 MG: 150 TABLET, EXTENDED RELEASE ORAL at 08:15

## 2024-02-21 RX ADMIN — CLONAZEPAM 1 MG: 1 TABLET ORAL at 17:01

## 2024-02-21 RX ADMIN — FLUTICASONE FUROATE AND VILANTEROL TRIFENATATE 1 PUFF: 200; 25 POWDER RESPIRATORY (INHALATION) at 08:17

## 2024-02-21 RX ADMIN — CEFEPIME 1000 MG: 1 INJECTION, POWDER, FOR SOLUTION INTRAMUSCULAR; INTRAVENOUS at 07:44

## 2024-02-21 RX ADMIN — OMEGA-3 FATTY ACIDS CAP 1000 MG 1000 MG: 1000 CAP at 08:19

## 2024-02-21 RX ADMIN — PREGABALIN 200 MG: 100 CAPSULE ORAL at 17:01

## 2024-02-21 RX ADMIN — SENNOSIDES, DOCUSATE SODIUM 1 TABLET: 8.6; 5 TABLET ORAL at 17:01

## 2024-02-21 RX ADMIN — CEFEPIME 1000 MG: 1 INJECTION, POWDER, FOR SOLUTION INTRAMUSCULAR; INTRAVENOUS at 17:01

## 2024-02-21 RX ADMIN — DOCUSATE SODIUM 100 MG: 100 CAPSULE, LIQUID FILLED ORAL at 08:15

## 2024-02-21 RX ADMIN — RIVAROXABAN 2.5 MG: 2.5 TABLET, FILM COATED ORAL at 17:02

## 2024-02-21 RX ADMIN — Medication 1 TABLET: at 08:15

## 2024-02-21 RX ADMIN — OXYBUTYNIN CHLORIDE 5 MG: 5 TABLET, EXTENDED RELEASE ORAL at 08:15

## 2024-02-21 NOTE — CASE MANAGEMENT
[facility] received request for authorization from Care Manager.  Authorization request for: Acute Rehab  Facility Name: [facility] NPI: [id number]   Facility MD: Ashley DePadua NPI: [id number]  Authorization initiated by contacting insurance: Ann   Via: Fax  Clinicals submitted via: fax # [phone]

## 2024-02-21 NOTE — ASSESSMENT & PLAN NOTE
Hypotension noted post op- now resolved   BP stable  C/w holding amiloride, can restart later if indicated

## 2024-02-21 NOTE — PROGRESS NOTES
[facility]  Progress Note  Name: Lona Cedeno I  MRN: [id number]  Unit/Bed#: [facility] 815-01 I Date of Admission: 2/13/2024   Date of Service: 2/21/2024 I Hospital Day: 8    Assessment/Plan   Other dietary vitamin B12 deficiency anemia  Assessment & Plan  B12 level 93  pending MMA   S/p b12 injection 2/17- give additional 2 doses to complete 3 doses total   Started PO supplements as well     Sepsis without acute organ dysfunction (HCC)  Assessment & Plan  Fever/leukocytosis at [facility] prior to arrival here, additionally with lactic acidosis resolved following IV fluids  Concern for surgical site infection at groin, s/p waschout with vac placement on 2/14 with vascular surgery   Received cefepime/vancomycin at [facility] prior to transfer to medical floor-currently on cefepime, vancomycin DC'd 2/16/2024  ID consult appreciated  Blood cultures NGTD  Covid/flu/rsv negative   Wound culture 2/14- (+) Klebsiella and Proteus growing  Anaerobic culture 2/14 no growth thus far  Urine culture-prelim> 100,000 enterobacter cloacae noted to be resistant to cefepime- likely secondary to colonization/asymptomatic bacteruria     Constipation  Assessment & Plan  Hasn't had BM recently, adjusted regimen    Diabetic foot ulcer with osteomyelitis ([facility])  Assessment & Plan  Podiatry assistance appreciated  Wound vac MWF -> may come off next change on 2/23      Tobacco use disorder  Assessment & Plan  Continue with nicotine patch  Encourage cessation    COPD, severity to be determined ([facility])  Assessment & Plan  Respiratory status is stable  Continue maintenance inhalers and PRN bronchodilators    PAD (peripheral artery disease) ([facility])  Assessment & Plan  1/31 BYPASS AXILLO-FEMORAL, RIGHT WITH 8MM RINGED PTFE GRAFT   Continue aspirin, statin, xarelto 2.5 mg bid     Essential hypertension  Assessment & Plan  Hypotension noted post op- now resolved   BP stable  C/w holding amiloride, can restart later if  indicated    Type 2 diabetes mellitus with diabetic polyneuropathy (HCC)  Assessment & Plan  Lab Results   Component Value Date    HGBA1C 6.5 (H) 11/07/2023       Recent Labs     02/20/24  2122 02/21/24  0717 02/21/24  1105 02/21/24  1610   POCGLU 149* 154* 217* 141*         Blood Sugar Average: Last 72 hrs:  (P) 165.8125  hold metformin while inpatient  Diabetic diet  Fingersticks QID with sliding scale coverage      * Surgical site infection  Assessment & Plan  Continue cefepime, vancomycin DC'd 2/16/2024  MRSA screen negative  Right groin wound breakdown status post right groin washout, VAC placement on 2/14 and 2/19  Infectious disease following-abx as per them- cont IV cefepime  Cultures + klebsiella and proteus, anaerobic cultures negative growth thus far               VTE Pharmacologic Prophylaxis: VTE Score: 3 Moderate Risk (Score 3-4) - Pharmacological DVT Prophylaxis Ordered: rivaroxaban (Xarelto).    Mobility:   Basic Mobility Inpatient Raw Score: 12  -HL Goal: 4: Move to chair/commode  JH-HLM Achieved: 4: Move to chair/commode  HLM Goal achieved. Continue to encourage appropriate mobility.    Patient Centered Rounds: I performed bedside rounds with nursing staff today.   Discussions with Specialists or Other Care Team Provider:     Education and Discussions with Family / Patient:  patient.     Total Time Spent on Date of Encounter in care of patient: 35 mins. This time was spent on one or more of the following: performing physical exam; counseling and coordination of care; obtaining or reviewing history; documenting in the medical record; reviewing/ordering tests, medications or procedures; communicating with other healthcare professionals and discussing with patient's family/caregivers.    Current Length of Stay: 8 day(s)  Current Patient Status: Inpatient   Certification Statement: The patient will continue to require additional inpatient hospital stay due to IV Abx, f/u plans by surgical  teams  Discharge Plan: Anticipate discharge in 48 hrs to rehab facility.    Code Status: Level 3 - DNAR and DNI    Subjective:   Feels ok, hasn't had bm in 2 days    Objective:     Vitals:   Temp (24hrs), Av.5 °F (36.9 °C), Min:98.1 °F (36.7 °C), Max:98.8 °F (37.1 °C)    Temp:  [98.1 °F (36.7 °C)-98.8 °F (37.1 °C)] 98.5 °F (36.9 °C)  HR:  [67-73] 73  Resp:  [15-16] 15  BP: (110-119)/(53-78) 110/78  SpO2:  [90 %-94 %] 92 %  Body mass index is 28.7 kg/m².     Input and Output Summary (last 24 hours):     Intake/Output Summary (Last 24 hours) at 2024 1654  Last data filed at 2024 1255  Gross per 24 hour   Intake 420 ml   Output 1900 ml   Net -1480 ml       Physical Exam:   Physical Exam  Vitals reviewed.   HENT:      Head: Normocephalic and atraumatic.   Cardiovascular:      Rate and Rhythm: Normal rate and regular rhythm.      Heart sounds: Normal heart sounds.   Pulmonary:      Effort: Pulmonary effort is normal. No respiratory distress.      Breath sounds: Normal breath sounds.   Abdominal:      General: There is no distension.      Palpations: Abdomen is soft.      Tenderness: There is no abdominal tenderness.   Musculoskeletal:      Right lower leg: No edema.      Left lower leg: No edema.   Neurological:      Mental Status: She is alert and oriented to person, place, and time.          Additional Data:     Labs:  Results from last 7 days   Lab Units 24  0507   WBC Thousand/uL 9.65   HEMOGLOBIN g/dL 11.0*   HEMATOCRIT % 34.4*   PLATELETS Thousands/uL 343   NEUTROS PCT % 59   LYMPHS PCT % 25   MONOS PCT % 6   EOS PCT % 9*     Results from last 7 days   Lab Units 24  0507   SODIUM mmol/L 142   POTASSIUM mmol/L 4.1   CHLORIDE mmol/L 103   CO2 mmol/L 32   BUN mg/dL 10   CREATININE mg/dL 0.55*   ANION GAP mmol/L 7   CALCIUM mg/dL 9.7   GLUCOSE RANDOM mg/dL 161*         Results from last 7 days   Lab Units 24  1610 24  1105 24  0717 24  2122 24  1619 24  1057  02/20/24  0746 02/19/24  2128 02/19/24  1559 02/19/24  1130 02/19/24  0951 02/19/24  0656   POC GLUCOSE mg/dl 141* 217* 154* 149* 141* 186* 152* 232* 137 131 149* 172*         Results from last 7 days   Lab Units 02/15/24  0602   PROCALCITONIN ng/ml 0.39*       Lines/Drains:  Invasive Devices       Peripheral Intravenous Line  Duration             Peripheral IV 02/21/24 Dorsal (posterior);Left Forearm <1 day              Drain  Duration             Ureteral Internal Stent Left ureter 6 Fr. 93 days                          Imaging: No pertinent imaging reviewed.    Recent Cultures (last 7 days):         Last 24 Hours Medication List:   Current Facility-Administered Medications   Medication Dose Route Frequency Provider Last Rate    acetaminophen  975 mg Oral Q8H JILLIAN Calogero Bhaskar, DO      albuterol  2 puff Inhalation Q6H PRN Calogero Bhaskar, DO      aspirin  81 mg Oral Daily Calogero Bhaskar, DO      atorvastatin  10 mg Oral Daily With Dinner Calogero Bhaskar, DO      bisacodyl  5 mg Oral Daily PRN Jennifer Jovel MD      buPROPion  300 mg Oral Daily Calogero Bhaskar, DO      cefepime  1,000 mg Intravenous Q12H Calogero Bhaskar, DO 1,000 mg (02/21/24 0744)    clonazePAM  1 mg Oral QPM Calogero Bhaskar, DO      vitamin B-12  1,000 mcg Oral Daily Calogero Bhaskar, DO      fish oil  1,000 mg Oral Daily Calogero Bhaskar, DO      fluticasone-vilanterol  1 puff Inhalation Daily Calogero Bhaskar, DO      insulin lispro  1-5 Units Subcutaneous TID With Meals Calogero Bhaskar, DO      lactulose  20 g Oral BID Jennifer Jovel MD      methocarbamol  250 mg Oral Q8H JILLIAN Calogero Bhaskar, DO      multivitamin-minerals  1 tablet Oral Daily Calogero Bhaskar, DO      nicotine  1 patch Transdermal Daily Calogero Bhaskar, DO      ondansetron  4 mg Intravenous Q6H PRN Calogero Bhaskar, DO      oxybutynin  5 mg Oral Daily Calogero Bhaskar, DO      oxyCODONE  2.5 mg Oral Q4H PRN Calogero Bhaskar, DO      Or     oxyCODONE  5 mg Oral Q4H PRN Calogero Bhaskar, DO      pantoprazole  40 mg Oral Early Morning Calogero Bhaskar, DO      polyethylene glycol  17 g Oral Daily Calogero Bhaskar, DO      pregabalin  200 mg Oral TID Calogero Bhaskar, DO      rivaroxaban  2.5 mg Oral BID With Meals Calogero Bhaskar, DO      senna-docusate sodium  1 tablet Oral BID Jennifer Jovel MD          Today, Patient Was Seen By: Jennifer Jovel MD    **Please Note: This note may have been constructed using a voice recognition system.**

## 2024-02-21 NOTE — ASSESSMENT & PLAN NOTE
Fever/leukocytosis at [facility] prior to arrival here, additionally with lactic acidosis resolved following IV fluids  Concern for surgical site infection at groin, s/p waschout with vac placement on 2/14 with vascular surgery   Received cefepime/vancomycin at [facility] prior to transfer to medical floor-currently on cefepime, vancomycin DC'd 2/16/2024  ID consult appreciated  Blood cultures NGTD  Covid/flu/rsv negative   Wound culture 2/14- (+) Klebsiella and Proteus growing  Anaerobic culture 2/14 no growth thus far  Urine culture-prelim> 100,000 enterobacter cloacae noted to be resistant to cefepime- likely secondary to colonization/asymptomatic bacteruria

## 2024-02-21 NOTE — PROGRESS NOTES
"[facility] Podiatry - Progress Note  Patient: Lona Cedeno 78 y.o. female   MRN: [id number]  PCP: Vivek Preston DO  Unit/Bed#: [facility] 815-01 Encounter: [id number]  Date Of Visit: 24    ASSESSMENT:    Lona Cedeno is a 78 y.o. 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 Right foot wound, 2nd digit surgical site and posterior ankle wound noted to be stable with no concern for acute infection at this time.   Posterior ankle wound noted to be granulating well with continued vac therapy.   Will continue with MWF wound vac changes. Plan for next vac change 24.   Podiatry to do all wound vac changes.   Discussed with patient possible need for STSG at a later date. Patient was amenable to this plan.   Will evaluate for suture removal of the 2nd digit amputation site at next dressing change.   Podiatry to do all dressing changes.   Elevation on green foam wedges or pillows when non-ambulatory.  Rest of care per primary team.    Wound VAC application  1. Number of sponges: 1 Black   2. Pressure settin continuous  3. Size of wound: 5.6 cm x 4.0 cm 0.2 cm   4. Description of wound: granular with bleeding of wound bed       Weight bearing status: Non weightbearing to right foot. WBAT to left foot.       SUBJECTIVE:     The patient was seen, evaluated, and assessed at bedside today. The patient was awake, alert, and in no acute distress. No acute events overnight. The patient reports she is doing well today. Patient denies N/V/F/chills/SOB/CP.      OBJECTIVE:     Vitals:   /53   Pulse 73   Temp 98.1 °F (36.7 °C) (Oral)   Resp 16   Ht 5' 1\" (1.549 m)   Wt 68.9 kg (151 lb 14.4 oz)   SpO2 94%   BMI " 28.70 kg/m²     Temp (24hrs), Av.7 °F (37.1 °C), Min:98.1 °F (36.7 °C), Max:99.2 °F (37.3 °C)      Physical Exam:     General:  Alert, cooperative, and in no distress.  Lower extremity exam:  Cardiovascular status at baseline.  Neurological status at baseline.  S/p partial right 2nd digit amputation. No calf tenderness noted.     Lower extremity wound(s) as noted below:    Wound #: 1  Location: right posterior ankle  Length 5.8 cm: Width 4 cm: Depth 0.2 cm:   Deepest Tissue Noted in Base: tendon  Probe to Bone: No  Peripheral Skin Description: Attached  Granulation: 100% Fibrotic Tissue: 0% Necrotic Tissue: 0%   Drainage Amount: moderate bloody  Signs of Infection: No     Wound #: 2  Location: right medial foot  Length 2 cm: Width 2.8 cm: Depth 0.1 cm:   Deepest Tissue Noted in Base: subcutaneous  Probe to Bone: No  Peripheral Skin Description: Attached  Granulation: 90% Fibrotic Tissue: 10% Necrotic Tissue: 0%   Drainage Amount: minimal  Signs of Infection: No       Clinical Images 24:                  Additional Data:     Labs:    Results from last 7 days   Lab Units 24  0507   WBC Thousand/uL 9.65   HEMOGLOBIN g/dL 11.0*   HEMATOCRIT % 34.4*   PLATELETS Thousands/uL 343   NEUTROS PCT % 59   LYMPHS PCT % 25   MONOS PCT % 6   EOS PCT % 9*     Results from last 7 days   Lab Units 24  0507   POTASSIUM mmol/L 4.1   CHLORIDE mmol/L 103   CO2 mmol/L 32   BUN mg/dL 10   CREATININE mg/dL 0.55*   CALCIUM mg/dL 9.7           * I Have Reviewed All Lab Data Listed Above.    Recent Cultures (last 7 days):     Results from last 7 days   Lab Units 24  1539 24  1534   GRAM STAIN RESULT  1+ Disintegrating polys*  1+ Gram positive rods* No Polys or Bacteria seen   WOUND CULTURE  1+ Growth of Klebsiella pneumoniae*  1+ Growth of Proteus vulgaris group*  Few Colonies of  --      Results from last 7 days   Lab Units 24  1539 24  1534   ANAEROBIC CULTURE  No anaerobes isolated No  "anaerobes isolated       Imaging: I have personally reviewed pertinent films in PACS  EKG, Pathology, and Other Studies: I have personally reviewed pertinent reports.      ** Please Note: Portions of the record may have been created with voice recognition software. Occasional wrong word or \"sound a like\" substitutions may have occurred due to the inherent limitations of voice recognition software. Read the chart carefully and recognize, using context, where substitutions have occurred. **      "

## 2024-02-21 NOTE — ASSESSMENT & PLAN NOTE
Continue cefepime, vancomycin DC'd 2/16/2024  MRSA screen negative  Right groin wound breakdown status post right groin washout, VAC placement on 2/14 and 2/19  Infectious disease following-abx as per them- cont IV cefepime  Cultures + klebsiella and proteus, anaerobic cultures negative growth thus far

## 2024-02-21 NOTE — PHYSICAL THERAPY NOTE
PHYSICAL THERAPY DISCHARGE SUMMARY    Patient unable to meet goals set at discharge as she required a transfer back to acute care for increased medical attention. There, patient will benefit from skilled PT intervention to maximize her functional mobility (I) and safety,    Shaneka Wilcox PT, DPT

## 2024-02-21 NOTE — CASE MANAGEMENT
Case Management Discharge Planning Note    Patient name Lona Cedeno  Location [facility] 815/[facility] 815-01 MRN [id number]  : 1945 Date 2024       Current Admission Date: 2024  Current Admission Diagnosis:Surgical site infection   Patient Active Problem List    Diagnosis Date Noted    Other dietary vitamin B12 deficiency anemia 2024    Leukocytosis 2024    Surgical site infection 2024    Sepsis without acute organ dysfunction (HCC) 2024    At risk for venous thromboembolism (VTE) 2024    At high risk for skin breakdown 2024    Wound of skin 2024    Impaired mobility and activities of daily living 2024    Acute pain due to injury 2024    Hematoma 2024    Constipation 2024    Preoperative cardiovascular examination 2024    Diabetic foot ulcer with osteomyelitis (HCC) 2024    Ankle ulcer, right, with fat layer exposed ([facility]) 2023    Age-related osteoporosis without current pathological fracture 2023    Abnormal CT of the chest 10/17/2023    COPD, severity to be determined ([facility]) 10/17/2023    Tobacco use disorder 10/17/2023    PAD (peripheral artery disease) ([facility]) 10/10/2023    Bladder neoplasm 2023    Left renal mass 2023    Lactic acidosis 2023    Hypomagnesemia 2023    Degenerative lumbar disc 2023    Urinary incontinence 2023    GERD without esophagitis 10/16/2019    Medicare annual wellness visit, subsequent 2019    Essential hypertension 02/15/2019    Anxiety and depression 02/15/2019    Type 2 diabetes mellitus with diabetic polyneuropathy (HCC) 2019      LOS (days): 8  Geometric Mean LOS (GMLOS) (days): 5.1  Days to GMLOS:-2.5     OBJECTIVE:  Risk of Unplanned Readmission Score: 21.64         Current admission status: Inpatient   [facility]acy:   [id number] [address]  [address] [address]  Phone:  [phone] Fax: [phone]    [facility] [place], [place] - [address]  [address] [place] [address]  Phone: [phone] Fax: [phone]    Primary Care Provider: Vivek Preston DO    Primary Insurance: [facility] REP  Secondary Insurance:     DISCHARGE DETAILS:                                            Additional Comments: Request for insurance authorization submitted for bed at [facility].  CM will continue to follow.

## 2024-02-21 NOTE — QUICK NOTE
Acute Care Surgery  Bedside V.A.C. Procedure Note    Location of wound: Right Groin    Dressings and Foam removed:  0  Dressings  2 Black Foam  0 White Foam    Dimensions of wound: 4 cm x 1 cm x 4 cm    Description of wound: On inspection of the wound today, clean with granulation tissue. There was no necrotic or nonviable tissue requiring debridement. Approximately 100% of the wound base is now pink and healthy and there is granulation tissue. The periwound skin remains clean and intact and unremarkable.    VAC dressing application:  The periwound skin was cleaned and dried. 2 black foam, 0 white foam were cut to size of the wound and placed into the wound. The dressings were then covered with VAC drape. Additional VAC drape and black foam was used to create a bridge to the patient's Right lateral thigh  and a base for the track pad. The track pad was then placed over the base of black foam. The VAC was then set to 125 mmHg low Continuous suction. The patient tolerated the procedure well and there were no complications. The patient did not require any excisional debridement during today's dressing change.    VAC settings:  125 mmHg  Continuous    Wound Images:      Additional Notes:  The VAC sticker placed over the dressing per protocol.  The next VAC dressing change will be planned for 2/23/24.    This dressing change took greater than 15 minutes to complete.    Himanshu Overton MD  2/21/2024 7:33 AM

## 2024-02-21 NOTE — PLAN OF CARE
Problem: Prexisting or High Potential for Compromised Skin Integrity  Goal: Skin integrity is maintained or improved  Description: INTERVENTIONS:  - Identify patients at risk for skin breakdown  - Assess and monitor skin integrity  - Assess and monitor nutrition and hydration status  - Monitor labs   - Assess for incontinence   - Turn and reposition patient  - Assist with mobility/ambulation  - Relieve pressure over bony prominences  - Avoid friction and shearing  - Provide appropriate hygiene as needed including keeping skin clean and dry  - Evaluate need for skin moisturizer/barrier cream  - Collaborate with interdisciplinary team   - Patient/family teaching  - Consider wound care consult   2/21/2024 1047 by Dulce Maria De Los Santos RN  Outcome: Progressing  2/21/2024 1042 by Dulce Maria De Los Santos RN  Outcome: Progressing     Problem: SKIN/TISSUE INTEGRITY - ADULT  Goal: Incision(s), wounds(s) or drain site(s) healing without S/S of infection  Description: INTERVENTIONS  - Assess and document dressing, incision, wound bed, drain sites and surrounding tissue  - Provide patient and family education  - Perform skin care/dressing changes every shift  2/21/2024 1047 by Dulce Maria De Los Santos RN  Outcome: Progressing  2/21/2024 1042 by Dulce Maria De Los Santos RN  Outcome: Progressing     Problem: INFECTION - ADULT  Goal: Absence or prevention of progression during hospitalization  Description: INTERVENTIONS:  - Assess and monitor for signs and symptoms of infection  - Monitor lab/diagnostic results  - Monitor all insertion sites, i.e. indwelling lines, tubes, and drains  - Monitor endotracheal if appropriate and nasal secretions for changes in amount and color  - [place] appropriate cooling/warming therapies per order  - Administer medications as ordered  - Instruct and encourage patient and family to use good hand hygiene technique  - Identify and instruct in appropriate isolation precautions for identified infection/condition  2/21/2024 1047 by  Dulce Maria De Los Santos RN  Outcome: Progressing  2/21/2024 1042 by Dulce Maria De Los Santos RN  Outcome: Progressing

## 2024-02-21 NOTE — ASSESSMENT & PLAN NOTE
Bladder Scan - 871 ml, insert walsh per standing orders    Bedside and Verbal shift change report given to Shirley Rodriguez (oncoming nurse) by Kelsea Renee RN (offgoing nurse).  Report included the following information SBAR, Kardex, Recent Results, Med Rec Status and Cardiac Rhythm SR. Lab Results   Component Value Date    HGBA1C 6.5 (H) 11/07/2023       Recent Labs     02/20/24  2122 02/21/24  0717 02/21/24  1105 02/21/24  1610   POCGLU 149* 154* 217* 141*         Blood Sugar Average: Last 72 hrs:  (P) 165.8125  hold metformin while inpatient  Diabetic diet  Fingersticks QID with sliding scale coverage

## 2024-02-21 NOTE — PROGRESS NOTES
PHYSICAL MEDICINE AND REHABILITATION   PREADMISSION ASSESSMENT     Projected IGC and Rehabilitation Diagnoses:  Impairment of mobility, safety and Activities of Daily Living (ADLs) due to Other Disabling Impairments:  13  Other Disabling Impairments  Etiologic: Diabetic right foot ulcer with osteomyelitis s/p fem bypass, right foot wound and achilles debridement with 2nd toe partial amputation and VAC placement   Date of Onset: 2/26/24   Date of surgery: 1/31, 2/4, 2/14    PATIENT INFORMATION  Name: Lona Cedeno Phone #: [phone] (home)   Address: [address] [place] [address] [address] [place] [id number]  YOB: 1945 Age: 78 y.o. #   Marital Status: /Civil Union  Ethnicity: White  Employment Status: retired  Extended Emergency Contact Information  Primary Emergency Contact: Carmelo Cdeeno   [facility]  Home Phone: [phone]  Relation: Spouse  Secondary Emergency Contact: Ariela Feng  Home Phone: [phone]  Mobile Phone: [phone]  Relation: Daughter  Advance Directive: Level 1 Full Code (has ACP docs)    INSURANCE/COVERAGE:     Primary Payor: [employer] REP / Plan: [employer]  REP / Product Type: Medicare PPO /   Secondary Payer: Self Pay   Payer Contact:  Payer Contact:   Contact Phone:  Contact Phone:     [facility] has received approved authorization from insurance: [employer]       Called in by Rep: Jazmine ROCHA#: [phone]  Authorization received for: Acute Rehab  Facility: [facility]   Authorization #: [id number]  Start of Care: 02/22  Next Review Date: 2 Bussiness Days   Submit next review to #: [phone]             MEDICARE #: [id number]   Medical Record #: [id number]    REFERRAL SOURCE:   Referring provider: Clara Gann DO  Referring facility: [facility]  Room: [facility] 815/[facility] 815-  PCP: Vivek Preston DO PCP phone number: [phone]    MEDICAL INFORMATION  HPI: Pt is a 78 year old female with  a PMH of PAD, COPD, neuropathy, HTN, HLD, tobacco abuse, anxiety, depression, bladder neoplasm and diabetes who presented to the [facility] on 1/23/2024 from podiatry office with diabetic ulcer of the right toe and right ankle wound with achilles tendon exposed. She was found to have osteomyelitis of the right foot and was transferred to [place] for possible bypass. Pt was recommended to undergo extra-anatomic bypass right axillary to femoral bypass and right BKA but patient elected to try further interventions to salvage her right foot and plastics was consulted. She underwent fem bypass on 1/31 and right foot wound and achilles debridement with 2nd toe partial amputation and VAC placement. She will likely undergo STSG for skin graft substitute with plastics. Her hospital course was complicated by right chest wall hematoma and she underwent evacuation with vascular on 2/4/2024. Podiatry following: VAC changed on 2/8. Plan to continue negative pressure wound VAC therapy at this time with next change on 2/10/2024.  Discussed with podiatry and patient will not need further OR interventions during this admission.  She will be discharged on wound VAC with follow-up with wound care and evaluation in the outpatient setting for possible split-thickness skin graft down the line. Pt was medically stable and tranfered to [facility] on 2/9.    While on [facility] she developed a groin surgical site infection with fever and leukocytosis and was started on cefepime and vancomycin IV and transferred to acute inpatient unit on 2/13 for further evaluation and therapy.  Patient had encephalopathy and met sepsis criteria.  On 2/14 she underwent right groin wound debridement with washout and application of wound VAC by vascular surgery.  Apparently the graft was not grossly exposed.  SARS Cov 2 PCR panel negative and blood cultures NGTD, Wound culture 2/14- (+) Klebsiella and Proteus growing, Anaerobic culture  2024-pending, Urine culture-prelim> 100,000 enterobacter cloacae noted to be resistant to cefepime- likely secondary to colonization/asymptomatic bacteruria.  Pt is s/p Right Groin washout VAC change . Wound vac changed at bedside on . Right foot wound, 2nd digit surgical site and posterior ankle wound noted to be stable with no concern for acute infection at this time. Posterior ankle wound noted to be granulating well with continued vac therapy. Will continue with MWF wound vac changes. The next VAC dressing change will be planned for 24. The VAC paperwork was completed and give to the case management staff to arrange home VAC therapy. Discussed with patient possible need for STSG at a later date. Patient was amenable to this plan.    PT and OT have been consulted and are recommending post-acute rehab services.   Patient's case has been reviewed with [facility] medical director, patient meets medical criteria for acute rehab and has demonstrated the ability to tolerate three or more hours of therapy per day. Patient is medically stable and ready for discharge to [facility].        Past Medical History:   Past Surgical History:   Allergies:     Past Medical History:   Diagnosis Date    Anxiety     Closed fracture of coccyx (HCC) 2023    Diabetes mellitus (HCC)     GERD (gastroesophageal reflux disease)     Hyperlipidemia     Hypertension     IBS (irritable bowel syndrome)     Shoulder fracture     Past Surgical History:   Procedure Laterality Date    ANKLE FRACTURE SURGERY Right     has screw in place     SECTION      x2    CHOLECYSTECTOMY      CYSTOSCOPY W/ LASER LITHOTRIPSY Left 2023    Procedure: CYSTOSCOPY; RETROGRADE; URETEROSCOPY; BIOPSY; LEFT -INSERTION OF STENT;  Surgeon: Cristian Child MD;  Location: [facility];  Service: Urology    CYSTOSCOPY W/ LASER LITHOTRIPSY Left 2023    Procedure: CYSTOSCOPY; RETROGRADE; URETEROSCOPY; LASER ABLATION OF LEFT RENAL COLLECTING SYSTEM  TUMOR;  Surgeon: Cristian Child MD;  Location: [facility];  Service: Urology    EVACUATION OF HEMATOMA Right 2/4/2024    Procedure: EVACUATION/ DRAINAGE CHEST WALL  HEMATOMA;  Surgeon: Seth Hoyos MD;  Location: [facility];  Service: Vascular    FL RETROGRADE PYELOGRAM  9/18/2023    HERNIA REPAIR      umbilicus w/ mesh    SC BYPASS W/VEIN AXILLARY-FEMORAL Right 1/31/2024    Procedure: BYPASS AXILLO-FEMORAL, RIGHT WITH 8MM RINGED PTFE GRAFT;  Surgeon: Seth Hoyos MD;  [facility];  Service: Vascular    WOUND DEBRIDEMENT Right 2/4/2024    Procedure: RIGHT WOUND AND ACHILLES DEBRIDEMENT FOOT/TOE (WASH OUT); PARTIAL AMPUTATION DISTAL 2ND TOE;  Surgeon: Aaron Montero DPM;  [facility]: [facility];  Service: Podiatry    WOUND DEBRIDEMENT Right 2/14/2024    Procedure: DEBRIDEMENT RIGHT GROIN  WOUND AND WASHOUT, APPLICATION OF WOUND VAC;  Surgeon: Suly Muro DO;  [facility];  Service: Vascular    WOUND DEBRIDEMENT Right 2/19/2024    Procedure: DEBRIDEMENT LOWER EXTREMITY, washout;  Surgeon: Suly Muro DO;  [facility];  Service: Vascular     Allergies   Allergen Reactions    Paroxetine Other (See Comments)     Became suicidal    Adhesive [Medical Tape] Itching and Blisters    Carafate [Sucralfate] Other (See Comments)     Mouth sores, tongue sore    Sulfa Antibiotics Hives and Rash    Sulfamethoxazole-Trimethoprim Hives         Medical/functional conditions requiring inpatient rehabilitation: Diabetic right foot ulcer with osteomyelitis s/p fem bypass, right foot wound and achilles debridement with 2nd toe partial amputation and VAC placement , Impaired mobility and self care, Decreased strength/endurance    Risk for medical/clinical complications: Risk for falls, Risk for skin breakdown secondary to decreased mobility, Risk for uncontrolled pain, Risk for infection or dehiscence    Comorbidities:  Right chest wall hematoma s/p evacuation   Constipation,  agree with starting miralax in addition for senna  Surgical site infection  Sepsis without acute organ dysfunction   Diabetic foot ulcer with osteomyelitis   Tobacco use  COPD  PAD  Acute pain due to surgery  HTN  Anxiety/depression   Diabetes type 2   Other dietary vitamin B12 deficiency anemia   DVT ppx: SCD     Surgeries in the last 100 days:   right axillofemoral bypass with vascular surgery on 1/31/24   Right chest wall hematoma evacuation, right foot wound and achilles debridement with 2nd toe partial amputation, VAC placement  on 2/4  right groin wound debridement with washout and application of wound VAC 2/14  S/P Right Groin washout VAC change 2/19.    CURRENT VITAL SIGNS:   Temp:  [96.6 °F (35.9 °C)-98.9 °F (37.2 °C)] 96.6 °F (35.9 °C)  HR:  [67-72] 67  Resp:  [16] 16  BP: (120-125)/(65-76) 123/76   Intake/Output Summary (Last 24 hours) at 2/23/2024 0958  Last data filed at 2/22/2024 1240  Gross per 24 hour   Intake 300 ml   Output --   Net 300 ml        LABORATORY RESULTS:      Lab Results   Component Value Date    HGB 10.9 (L) 02/23/2024    HGB 16.8 (H) 05/21/2018    HCT 35.3 02/23/2024    HCT 50.1 (H) 05/21/2018    WBC 7.99 02/23/2024    WBC 9.0 05/21/2018     Lab Results   Component Value Date    BUN 17 02/23/2024    BUN 15 05/21/2018     05/21/2018    K 3.9 02/23/2024    K 4.0 05/21/2018     02/23/2024    CL 95 (L) 05/21/2018    GLUCOSE 143 (H) 01/31/2024    CREATININE 0.59 (L) 02/23/2024    CREATININE 0.62 05/21/2018     Lab Results   Component Value Date    PROTIME 14.7 (H) 02/14/2024    INR 1.16 02/14/2024        DIAGNOSTIC STUDIES:  No results found.    PRECAUTIONS/SPECIAL NEEDS:  Weight Bearing Precautions:  NWB R LE with wound VAC and prevlon boot and WBAT L LE, Wound VAC R groin, Anticoagulation:  aspirin 81 mg orally every day and heparin, Edema Management, Safety Concerns, Pain Management, Dietary Restrictions: Cristian/CHO, Hard of Hearing, and Language Preference: English, Fall  precautions     MEDICATIONS:     Current Facility-Administered Medications:     acetaminophen (TYLENOL) tablet 975 mg, 975 mg, Oral, Q8H JILLIAN, Calogero Bhaskar, DO, 975 mg at 02/23/24 0532    albuterol (PROVENTIL HFA,VENTOLIN HFA) inhaler 2 puff, 2 puff, Inhalation, Q6H PRN, Calogero Bhaskar, DO    aspirin (ECOTRIN LOW STRENGTH) EC tablet 81 mg, 81 mg, Oral, Daily, Calogero Bhaskar, DO, 81 mg at 02/23/24 0919    atorvastatin (LIPITOR) tablet 10 mg, 10 mg, Oral, Daily With Dinner, Calogero Bhaskar, DO, 10 mg at 02/22/24 1718    bisacodyl (DULCOLAX) EC tablet 5 mg, 5 mg, Oral, Daily PRN, Jennifer Jovel MD    buPROPion (WELLBUTRIN XL) 24 hr tablet 300 mg, 300 mg, Oral, Daily, Calogero Bhaskar, DO, 300 mg at 02/23/24 0919    cefepime (MAXIPIME) 1,000 mg in dextrose 5 % 50 mL IVPB, 1,000 mg, Intravenous, Q12H, Calogero Bhaskar, DO, Last Rate: 100 mL/hr at 02/23/24 0535, 1,000 mg at 02/23/24 0535    clonazePAM (KlonoPIN) tablet 1 mg, 1 mg, Oral, QPM, Calogero Bhaskar, DO, 1 mg at 02/22/24 1718    cyanocobalamin (VITAMIN B-12) tablet 1,000 mcg, 1,000 mcg, Oral, Daily, Calogero Bhaskar, DO, 1,000 mcg at 02/23/24 0919    fish oil capsule 1,000 mg, 1,000 mg, Oral, Daily, Calogero Bhaskar, DO, 1,000 mg at 02/23/24 0919    fluticasone-vilanterol 200-25 mcg/actuation 1 puff, 1 puff, Inhalation, Daily, Calogero Bhaskar, DO, 1 puff at 02/23/24 0920    insulin lispro (HumaLOG) 100 units/mL subcutaneous injection 1-5 Units, 1-5 Units, Subcutaneous, TID With Meals, 1 Units at 02/23/24 0920 **AND** Fingerstick Glucose (POCT), , , TID AC, Calogero Bhaskar, DO    methocarbamol (ROBAXIN) tablet 250 mg, 250 mg, Oral, Q8H JILLIAN, Calogero Bhaskar, DO, 250 mg at 02/23/24 0532    multivitamin-minerals (CENTRUM) tablet 1 tablet, 1 tablet, Oral, Daily, Calogero Bhaskar, DO, 1 tablet at 02/23/24 0919    nicotine (NICODERM CQ) 14 mg/24hr TD 24 hr patch 1 patch, 1 patch, Transdermal, Daily, Calogero Bhaskar, DO, 1 patch at 02/23/24  0923    ondansetron (ZOFRAN) injection 4 mg, 4 mg, Intravenous, Q6H PRN, Calogero Bhaskar, DO    oxybutynin (DITROPAN-XL) 24 hr tablet 5 mg, 5 mg, Oral, Daily, Calogero Bhaskar, DO, 5 mg at 02/23/24 0919    oxyCODONE (ROXICODONE) split tablet 2.5 mg, 2.5 mg, Oral, Q4H PRN **OR** oxyCODONE (ROXICODONE) IR tablet 5 mg, 5 mg, Oral, Q4H PRN, Calogero Bhaskar, DO, 5 mg at 02/22/24 0352    pantoprazole (PROTONIX) EC tablet 40 mg, 40 mg, Oral, Early Morning, Calogero Bhaskar, DO, 40 mg at 02/22/24 0819    polyethylene glycol (MIRALAX) packet 17 g, 17 g, Oral, Daily, Calogero Bhaskar, DO, 17 g at 02/21/24 0816    pregabalin (LYRICA) capsule 200 mg, 200 mg, Oral, TID, Calogero Bhaskar, DO, 200 mg at 02/23/24 0919    rivaroxaban (XARELTO) tablet 2.5 mg, 2.5 mg, Oral, BID With Meals, Calogero Bhaskar, DO, 2.5 mg at 02/23/24 0921    senna-docusate sodium (SENOKOT S) 8.6-50 mg per tablet 1 tablet, 1 tablet, Oral, BID, Jennifer Jovel MD, 1 tablet at 02/23/24 0919    SKIN INTEGRITY:   Wound 01/09/24 Arterial Ulcer Ankle Medial;Right  Wound 01/09/24 Arterial Ulcer Ankle Lateral;Right  Wound 01/09/24 Arterial Ulcer Ankle Right;Posterior  Wound 01/09/24 Arterial Ulcer Ankle Left;Lateral  Wound 01/23/24 Diabetic Ulcer Toe D3, third Anterior;Right  Wound 01/23/24 Pressure Injury Toe D5, fifth Anterior;Right  Wound 02/04/24 Toe D2, second Right  Wound 02/04/24 Chest Right  Wound 02/14/24 Groin Right  Wound 02/17/24 MASD Sacrum  Wound 02/18/24 Finger D4, ring Anterior;Left  Wound 02/18/24 Finger D5, small Anterior;Left       PRIOR LEVEL OF FUNCTION:  She lives in a(n) single family home  Lona Cedeno is  and lives with their family.  Self Care: Independent, Indoor Mobility: Independent, Stairs (in/outdoor): Independent, and Cognition: Independent    FALLS IN THE LAST 6 MONTHS: 0    HOME ENVIRONMENT:  The living area: can live on one level  There are 5 steps to enter the home.    The patient will not have 24 hour  supervision/physical assistance available upon discharge.    PREVIOUS DME:  Equipment in home (previous DME): Shower Chair and Grab Bars    FUNCTIONAL STATUS:  Physical Therapy Occupational Therapy Speech Therapy   02/22/24 1440    PT Last Visit   PT Visit Date 02/22/24   Note Type   Note Type Treatment   Education   Education Provided Yes   Pain Assessment   Pain Assessment Tool 0-10   Pain Score No Pain   Restrictions/Precautions   Weight Bearing Precautions Per Order Yes   RLE Weight Bearing Per Order (S)  NWB   Other Precautions Chair Alarm;Bed Alarm;WBS;Multiple lines;Fall Risk;Pain   General   Chart Reviewed Yes   Response to Previous Treatment Patient with no complaints from previous session.   Family/Caregiver Present No   Cognition   Arousal/Participation Alert;Responsive   Attention Within functional limits   Following Commands Follows one step commands without difficulty   Comments Pt cooperative and very pleasant during session   Subjective   Subjective Agreeable to mobilize   Bed Mobility   Supine to Sit 4  Minimal assistance   Additional items Assist x 1;Increased time required;LE management   Sit to Supine Unable to assess   Additional Comments Pt noted to be in bed upon arrival.   Transfers   Sit to Stand 4  Minimal assistance   Additional items Assist x 2;Increased time required;Verbal cues   Stand to Sit 4  Minimal assistance   Additional items Assist x 2;Increased time required;Verbal cues   Stand pivot 4  Minimal assistance   Additional items Assist x 2;Increased time required;Verbal cues   Additional Comments Pt completes STS from EOB with Ax 2, VCs . Completes STS with RW X 1. Pt completes 2nd STS and then SPS from bed to drop arm recliner with B/L HHA + sacral support. Maintains NWB R LE through transfer.   Ambulation/Elevation   Gait pattern Not appropriate   Balance   Static Sitting Fair +   Dynamic Sitting Fair   Static Standing Fair -   Dynamic Standing Poor +   Endurance Deficit    Endurance Deficit Yes   Activity Tolerance   Activity Tolerance Patient limited by fatigue   Medical Staff Made Aware Elvia Restorative tech present for part of session   Nurse Made Aware RN cleared pt for therapy   Exercises   Hip Flexion Sitting;15 reps;Left  (2 sets)   Knee AROM Long Arc Quad Sitting;15 reps;Bilateral  (2 sets)   Ankle Pumps Sitting;20 reps;Left   UE Exercise    (Bicep curls, Shoulder flexion)   Balance training  EOB sitting with 1 UE support /no UE support   Assessment   Prognosis Fair   Problem List Decreased strength;Decreased endurance;Decreased mobility;Pain;Decreased skin integrity;Decreased safety awareness   Assessment Pt seen for PT treatment session this date. Pt cooperative and very pleasant during session. Pt motivated to participate and verbalizes wanting to get stronger to go home after rehab. Pt able to get to EOB with Min A . Maintains sitting with supervision , completing therapeutic exercises with cues. Pt able to perform 2 STS transfers and then complete a SPS transfer from bed to drop arm recliner with Ax 2, minimal cueing required to maintain WB precautions, pt able to adhere to WB. Pt with increased activity tolerance . Pt making steady progress toward goals. Pt was left in recliner at the end of PT session with all needs in reach. Pt would benefit from continued PT services while in hospital to address remaining limitations. The patient's AM-PAC Basic Mobility Inpatient Short Form Raw Score is 11. A Raw score of less than or equal to 16 suggests the patient may benefit from discharge to post-acute rehabilitation services. Please also refer to the recommendation of the Physical Therapist for safe discharge planning.Post dc recommendation is Level I at this time.    02/23/24 0915    OT Last Visit   OT Visit Date 02/23/24   Note Type   Note Type Treatment   Pain Assessment   Pain Assessment Tool 0-10   Pain Location/Orientation Orientation: Right;Location: Foot;Location:  Abdomen   Pain Onset/Description Onset: Ongoing   Patient's Stated Pain Goal No pain   Hospital Pain Intervention(s) Repositioned;Ambulation/increased activity;Emotional support;Elevated   Restrictions/Precautions   Weight Bearing Precautions Per Order Yes   RLE Weight Bearing Per Order (S)  NWB   Braces or Orthoses Other (Comment)  (prevalon boot R LE)   Other Precautions Cognitive;Bed Alarm;Chair Alarm;WBS;Multiple lines;Fall Risk;Pain   Lifestyle   Autonomy Pt reports being IND with ADLs/IADLs PTA with SPC for stairs, rollator for community distances. 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   Service to Others Pt is retired   Intrinsic Gratification Pt enjoys reading   ADL   Where Assessed Edge of bed   Eating Assistance 5  Supervision/Setup   Grooming Assistance 5  Supervision/Setup   Grooming Deficit Increased time to complete;Wash/dry hands;Wash/dry face;Brushing hair   UB Bathing Assistance 4  Minimal Assistance   UB Bathing Deficit Setup;Increased time to complete;Chest;Right arm;Left arm;Abdomen   UB Bathing Comments Simulated bathing with min A for UB and washing back   LB Bathing Assistance 4  Minimal Assistance   LB Bathing Deficit Buttocks;Perineal area;Right upper leg;Left upper leg;Right lower leg including foot;Left lower leg including foot   LB Bathing Comments Simulated bathing with Min A for LE   UB Dressing Assistance 4  Minimal Assistance   UB Dressing Deficit Thread LUE;Thread RUE;Pull around back   LB Dressing Assistance 2  Maximal Assistance   LB Dressing Deficit Don/doff L sock   Bed Mobility   Supine to Sit 4  Minimal assistance   Additional items Assist x 1;HOB elevated;Bedrails;Increased time required;Verbal cues   Sit to Supine Unable to assess   Additional Comments Pt requiring VCs for pacing through activity, Min A for bed mobility. Pt seated OOB in recliner post session with RN attending   Transfers   Sit to Stand 4  Minimal  "assistance   Additional items Assist x 2;Increased time required;Verbal cues   Stand to Sit 4  Minimal assistance   Additional items Assist x 2;Increased time required;Verbal cues   Stand pivot 4  Minimal assistance   Additional items Assist x 2;Increased time required;Verbal cues   Additional Comments Min Ax2 with b/l HHA, VCs for pacing through steps and recalling WBS.   Functional Mobility   Functional Mobility 4  Minimal assistance   Additional Comments Ax2, VCs to maintain NWB status   Additional items Hand hold assistance   Subjective   Subjective \"I just want to go home\"   Cognition   Overall Cognitive Status Impaired   Arousal/Participation Alert;Cooperative   Attention Attends with cues to redirect   Orientation Level Oriented to person;Oriented to place;Oriented to situation   Memory Decreased recall of precautions   Following Commands Follows one step commands without difficulty   Comments Pt pleasant and cooperative, requiring VCs for WBS and safety awareness. VCs to maintain attention to task.   Activity Tolerance   Activity Tolerance Patient limited by fatigue;Patient limited by pain   Medical Staff Made Aware RN cleared for therapy, PT Katerine   Assessment   Assessment Patient participated in Skilled OT session this date with interventions consisting of ADL re training with the use of correct body mechnaics, Energy Conservation techniques, safety awareness and fall prevention techniques,  therapeutic activities to: increase activity tolerance, and increase OOB/ sitting tolerance . Patient agreeable to OT treatment session, upon arrival patient was found seated OOB to Chair, supine in bed, alert, responsive , and in no apparent distress.  In comparison to previous session, patient with improvements in ADL completion, functional mobility. Patient requiring verbal cues for safety, frequent rest periods, and ocassional safety reminders. Pt pleasant and cooperative, motivated to participate. Pt completed " functional bed mobility with min Ax1. Min Ax2 for functional STS txfs with b/l HHA. Min Ax2 for functional SPT to chair with drop arm. Pt requiring VCs to recall WBS. Pt required SUP for grooming to wash hands/face. Min A for UB bathing and LB bathing (pt completed simulated task 2/2 being recently washed up). Pt required min A for UB dressing to don/doff gown. Max A for LB dressing to don/doff L sock. Patient continues to be functioning below baseline level, occupational performance remains limited secondary to factors listed above and increased risk for falls and injury. The patient's raw score on the -PAC Daily Activity Inpatient Short Form is 17. A raw score of less than 19 suggests the patient may benefit from discharge to post-acute rehabilitation services. Please refer to the recommendation of the Occupational Therapist for safe discharge planning. From OT standpoint, recommendation at time of d/c would be Level 1 resources.  Patient to benefit from continued Occupational Therapy treatment while in the hospital to address deficits as defined above and maximize level of functional independence with ADLs and functional mobility.         CARE SCORES:  Self Care:  Eatin: Independent  Oral hygiene: 04: Supervision or touching  assistance  Toilet hygiene: 03: Partial/moderate assistance  Shower/bathing self: 03: Partial/moderate assistance  Upper body dressin: Partial/moderate assistance  Lower body dressin: Substantial/maximal assistance  Putting on/taking off footwear: 02: Substantial/maximal assistance  Transfers:  Roll left and right: 04: Supervision or touching  assistance  Sit to lyin: Partial/moderate assistance  Lying to sitting on side of bed: 03: Partial/moderate assistance  Sit to stand: 01: Dependent (Min A x2)  Chair/bed to chair transfer: 01: Dependent (Min A x2)  Toilet transfer: 01: Dependent (Min A x2)   Mobility:  Walk 10 ft: 88: Not attempted due to medical conditions or  safety concerns  Walk 50 ft with two turns: 88: Not attempted due to medical conditions or safety concerns  Walk 150ft: 88: Not attempted due to medical conditions or safety concerns    CURRENT GAP IN FUNCTION  Prior to Admission: Functional Status: Patient was independent with mobility/ambulation, transfers, ADL's, IADL's.    Expected functional outcomes: It is expected that with skilled acute rehabilitation services the patient will progress to Independent for self care and Independent for mobility     Estimated length of stay: 10 to 14 days    Anticipated Post-Discharge Disposition/Treatment  Disposition: Return to previous home/apartment.  Outpatient Services: Physical Therapy (PT) and Occupational Therapy (OT)    BARRIERS TO DISCHARGE  Weakness, Pain, Balance Difficulty, Fatigue, Home Accessibility, Caregiver Accessibility, and Equipment Needs    INTERVENTIONS FOR DISCHARGE  Adaptive equipment, Patient/Family/Caregiver Education, Community Resources, Support Group, Arrange DME needs, Therapy exercises, and Energy conservation education     REQUIRED THERAPY:  Patient will require PT and OT 90 minutes each per day, five days per week to achieve rehab goals.     REQUIRED FUNCTIONAL AND MEDICAL MANAGEMENT FOR INPATIENT REHABILITATION:  Skin:  Monitor skin for breakdown, Pain Management: Overall pain is moderately controlled, Deep Vein Thrombosis (DVT) Prophylaxis:  Per MD orders , further internal medicine management of additional medical conditions while on ARC, PT/OT intervention, patient/family education and training, and any needed consults PRN.    RECOMMENDED LEVEL OF CARE:    Pt is a 78 year old female with a PMH of PAD, COPD, neuropathy, HTN, HLD, tobacco abuse, anxiety, depression, bladder neoplasm and diabetes who presented to the [facility] on 1/23/2024 from podiatry office with diabetic ulcer of the right toe and right ankle wound with achilles tendon exposed. She was  found to have osteomyelitis of the right foot and was transferred to [place] for possible bypass. Pt was recommended to undergo extra-anatomic bypass right axillary to femoral bypass and right BKA but patient elected to try further interventions to salvage her right foot and plastics was consulted. She underwent fem bypass on 1/31 and right foot wound and achilles debridement with 2nd toe partial amputation and VAC placement. She will likely undergo STSG for skin graft substitute with plastics. Her hospital course was complicated by right chest wall hematoma and she underwent evacuation with vascular on 2/4/2024. Podiatry following: VAC changed on 2/8. Plan to continue negative pressure wound VAC therapy at this time with next change on 2/10/2024.  Discussed with podiatry and patient will not need further OR interventions during this admission.  She will be discharged on wound VAC with follow-up with wound care and evaluation in the outpatient setting for possible split-thickness skin graft down the line. Pt was medically stable and tranfered to [facility] on 2/9.  While on [facility] she developed a groin surgical site infection with fever and leukocytosis and was started on cefepime and vancomycin IV and transferred to acute inpatient unit on 2/13 for further evaluation and therapy.  Patient had encephalopathy and met sepsis criteria.  On 2/14 she underwent right groin wound debridement with washout and application of wound VAC by vascular surgery.  Apparently the graft was not grossly exposed.  SARS Cov 2 PCR panel negative and blood cultures NGTD, Wound culture 2/14- (+) Klebsiella and Proteus growing, Anaerobic culture 2/14/2024-pending, Urine culture-prelim> 100,000 enterobacter cloacae noted to be resistant to cefepime- likely secondary to colonization/asymptomatic bacteruria.  Pt is s/p Right Groin washout VAC change 2/19. Wound vac changed at bedside on 2/21. Right foot wound, 2nd digit surgical site and posterior  ankle wound noted to be stable with no concern for acute infection at this time. Posterior ankle wound noted to be granulating well with continued vac therapy. Will continue with F wound vac changes. The next VAC dressing change will be planned for 2/26/24. The VAC paperwork was completed and give to the case management staff to arrange home VAC therapy. Discussed with patient possible need for STSG at a later date. Patient was amenable to this plan.  Pt was independent PTA with transfers, amb, and ADLs. Pt lives with spouse and daughter in a [place] home with 5 PATO. Pt is currently functioning below baseline needing Max A for ADLs and Min A x2 for transfers/amb. Pt would benefit from [facility] admission to have close medical management while participating in 3 hours of therapy per day that will include physical and occupational therapy. Physical and occupational therapists will address functional mobility deficits and assist patient in improving their strength, endurance, ROM, and self care. Pt will have 24/7 nursing care to monitor routine vitals, I/Os, skin integrity, and overall condition. The rehab nursing staff will follow therapy recommendations to have 24 hour follow through during non-therapy hours. PM&R to maximize function and provide medical oversight. The MD will monitor patient co-morbidities while on the unit as well as order any additional tests, labs, and consults needed. The [facility] specialized interdisciplinary team will meet weekly to discuss patient overall medical status and rehab goals in preparation for D/C home. Inpatient acute rehab is recommended for patient to maximize overall strength, endurance, self care, and mobility for a safe and timely transition back home.

## 2024-02-22 LAB
GLUCOSE SERPL-MCNC: 105 MG/DL (ref 65–140)
GLUCOSE SERPL-MCNC: 151 MG/DL (ref 65–140)
GLUCOSE SERPL-MCNC: 158 MG/DL (ref 65–140)
GLUCOSE SERPL-MCNC: 187 MG/DL (ref 65–140)

## 2024-02-22 PROCEDURE — 97530 THERAPEUTIC ACTIVITIES: CPT

## 2024-02-22 PROCEDURE — 99232 SBSQ HOSP IP/OBS MODERATE 35: CPT | Performed by: STUDENT IN AN ORGANIZED HEALTH CARE EDUCATION/TRAINING PROGRAM

## 2024-02-22 PROCEDURE — 97110 THERAPEUTIC EXERCISES: CPT

## 2024-02-22 PROCEDURE — 99232 SBSQ HOSP IP/OBS MODERATE 35: CPT | Performed by: INTERNAL MEDICINE

## 2024-02-22 PROCEDURE — 82948 REAGENT STRIP/BLOOD GLUCOSE: CPT

## 2024-02-22 RX ADMIN — OMEGA-3 FATTY ACIDS CAP 1000 MG 1000 MG: 1000 CAP at 08:19

## 2024-02-22 RX ADMIN — METHOCARBAMOL 250 MG: 500 TABLET ORAL at 21:34

## 2024-02-22 RX ADMIN — PREGABALIN 200 MG: 100 CAPSULE ORAL at 08:19

## 2024-02-22 RX ADMIN — SENNOSIDES, DOCUSATE SODIUM 1 TABLET: 8.6; 5 TABLET ORAL at 08:19

## 2024-02-22 RX ADMIN — PANTOPRAZOLE SODIUM 40 MG: 40 TABLET, DELAYED RELEASE ORAL at 08:19

## 2024-02-22 RX ADMIN — PREGABALIN 200 MG: 100 CAPSULE ORAL at 17:18

## 2024-02-22 RX ADMIN — BUPROPION HYDROCHLORIDE 300 MG: 150 TABLET, EXTENDED RELEASE ORAL at 08:19

## 2024-02-22 RX ADMIN — ATORVASTATIN CALCIUM 10 MG: 10 TABLET, FILM COATED ORAL at 17:18

## 2024-02-22 RX ADMIN — Medication 1 TABLET: at 08:19

## 2024-02-22 RX ADMIN — INSULIN LISPRO 1 UNITS: 100 INJECTION, SOLUTION INTRAVENOUS; SUBCUTANEOUS at 08:19

## 2024-02-22 RX ADMIN — ACETAMINOPHEN 975 MG: 325 TABLET, FILM COATED ORAL at 21:34

## 2024-02-22 RX ADMIN — CYANOCOBALAMIN TAB 500 MCG 1000 MCG: 500 TAB at 08:19

## 2024-02-22 RX ADMIN — METHOCARBAMOL 250 MG: 500 TABLET ORAL at 14:01

## 2024-02-22 RX ADMIN — RIVAROXABAN 2.5 MG: 2.5 TABLET, FILM COATED ORAL at 08:19

## 2024-02-22 RX ADMIN — OXYCODONE HYDROCHLORIDE 5 MG: 5 TABLET ORAL at 03:52

## 2024-02-22 RX ADMIN — ASPIRIN 81 MG: 81 TABLET, COATED ORAL at 08:19

## 2024-02-22 RX ADMIN — FLUTICASONE FUROATE AND VILANTEROL TRIFENATATE 1 PUFF: 200; 25 POWDER RESPIRATORY (INHALATION) at 08:18

## 2024-02-22 RX ADMIN — OXYBUTYNIN CHLORIDE 5 MG: 5 TABLET, EXTENDED RELEASE ORAL at 08:19

## 2024-02-22 RX ADMIN — CEFEPIME 1000 MG: 1 INJECTION, POWDER, FOR SOLUTION INTRAMUSCULAR; INTRAVENOUS at 06:15

## 2024-02-22 RX ADMIN — INSULIN LISPRO 1 UNITS: 100 INJECTION, SOLUTION INTRAVENOUS; SUBCUTANEOUS at 11:43

## 2024-02-22 RX ADMIN — CEFEPIME 1000 MG: 1 INJECTION, POWDER, FOR SOLUTION INTRAMUSCULAR; INTRAVENOUS at 17:18

## 2024-02-22 RX ADMIN — NICOTINE 1 PATCH: 14 PATCH, EXTENDED RELEASE TRANSDERMAL at 08:21

## 2024-02-22 RX ADMIN — SENNOSIDES, DOCUSATE SODIUM 1 TABLET: 8.6; 5 TABLET ORAL at 17:18

## 2024-02-22 RX ADMIN — RIVAROXABAN 2.5 MG: 2.5 TABLET, FILM COATED ORAL at 17:27

## 2024-02-22 RX ADMIN — PREGABALIN 200 MG: 100 CAPSULE ORAL at 21:34

## 2024-02-22 RX ADMIN — ACETAMINOPHEN 975 MG: 325 TABLET, FILM COATED ORAL at 14:00

## 2024-02-22 RX ADMIN — CLONAZEPAM 1 MG: 1 TABLET ORAL at 17:18

## 2024-02-22 NOTE — PROGRESS NOTES
[facility]  Progress Note  Name: Lona Cedeno I  MRN: [id number]  Unit/Bed#: [facility] 815-01 I Date of Admission: 2/13/2024   Date of Service: 2/22/2024 I Hospital Day: 9    Assessment/Plan   * Surgical site infection  Assessment & Plan  Continue cefepime, vancomycin DC'd 2/16/2024  MRSA screen negative  Right groin wound breakdown status post right groin washout, VAC placement on 2/14 and 2/19  Infectious disease following-abx as per them- cont IV cefepime, may need long-term antibiotics to cover possibility of graft infection once cleared by vascular surgery and podiatry.  Cultures + klebsiella and proteus, anaerobic cultures negative growth thus far    Sepsis without acute organ dysfunction (HCC)  Assessment & Plan  Fever/leukocytosis at [facility] prior to arrival here, additionally with lactic acidosis resolved following IV fluids  Concern for surgical site infection at groin, s/p waschout with vac placement on 2/14 with vascular surgery   Received cefepime/vancomycin at [facility] prior to transfer to medical floor-currently on cefepime, vancomycin DC'd 2/16/2024  ID consult appreciated  Blood cultures NGTD  Covid/flu/rsv negative   Wound culture 2/14- (+) Klebsiella and Proteus growing  Anaerobic culture 2/14 no growth thus far  Urine culture-prelim> 100,000 enterobacter cloacae noted to be resistant to cefepime- likely secondary to colonization/asymptomatic bacteruria     Diabetic foot ulcer with osteomyelitis (HCC)  Assessment & Plan  Patient with right posterior ankle ulcer with exposed Achilles tendon s/p right foot debridement on 2/4/2024 with VAC, right medial ankle ulcer, right second toe osteomyelitis s/p right second toe amputation on 2//2024  Podiatry assistance appreciated  Wound vac MWF -> may come off next change on 2/23      Other dietary vitamin B12 deficiency anemia  Assessment & Plan  B12 level 93  pending MMA   S/p b12 injection 2/17- give additional 2 doses to complete  3 doses total   Started PO supplements as well     Constipation  Assessment & Plan  Had bowel movement this morning, continue bowel regimen    Tobacco use disorder  Assessment & Plan  Continue with nicotine patch  Encourage cessation    COPD, severity to be determined ([facility])  Assessment & Plan  Respiratory status is stable  Continue maintenance inhalers and PRN bronchodilators    PAD (peripheral artery disease) ([facility])  Assessment & Plan  1/31 BYPASS AXILLO-FEMORAL, RIGHT WITH 8MM RINGED PTFE GRAFT   Continue aspirin, statin, xarelto 2.5 mg bid     Essential hypertension  Assessment & Plan  Hypotension noted post op- now resolved   BP stable  C/w holding amiloride, can restart later if indicated    Type 2 diabetes mellitus with diabetic polyneuropathy ([facility])  Assessment & Plan  Lab Results   Component Value Date    HGBA1C 6.5 (H) 11/07/2023       Recent Labs     02/21/24  1610 02/21/24  2102 02/22/24  0708 02/22/24  1115   POCGLU 141* 153* 158* 187*         Blood Sugar Average: Last 72 hrs:  (P) 163.9736764705003880  hold metformin while inpatient  Diabetic diet  Fingersticks QID with sliding scale coverage             VTE Pharmacologic Prophylaxis: VTE Score: 3 Moderate Risk (Score 3-4) - Pharmacological DVT Prophylaxis Ordered: rivaroxaban (Xarelto).    Mobility:   Basic Mobility Inpatient Raw Score: 11  JH-HLM Goal: 4: Move to chair/commode  JH-HLM Achieved: 4: Move to chair/commode  HLM Goal achieved. Continue to encourage appropriate mobility.    Patient Centered Rounds: I performed bedside rounds with nursing staff today.   Discussions with Specialists or Other Care Team Provider: Podiatry, vascular surgery, ID    Education and Discussions with Family / Patient:  patient.     Total Time Spent on Date of Encounter in care of patient: 25 mins. This time was spent on one or more of the following: performing physical exam; counseling and coordination of care; obtaining or reviewing history; documenting in the medical  record; reviewing/ordering tests, medications or procedures; communicating with other healthcare professionals and discussing with patient's family/caregivers.    Current Length of Stay: 9 day(s)  Current Patient Status: Inpatient   Certification Statement: The patient will continue to require additional inpatient hospital stay due to awaiting clearance from vascular surgery and ID for discharge, currently on IV antibiotics, awaiting transition to oral antibiotics per ID  Discharge Plan: Anticipate discharge in 24-48 hrs to rehab facility.    Code Status: Level 3 - DNAR and DNI    Subjective:   Patient assessed at bedside.  No complaints.  Bowel movement today.    Objective:     Vitals:   Temp (24hrs), Av.2 °F (36.8 °C), Min:97.6 °F (36.4 °C), Max:98.9 °F (37.2 °C)    Temp:  [97.6 °F (36.4 °C)-98.9 °F (37.2 °C)] 98.9 °F (37.2 °C)  HR:  [72-76] 72  Resp:  [16-18] 16  BP: (120-130)/(50-92) 120/65  SpO2:  [92 %-97 %] 97 %  Body mass index is 28.7 kg/m².     Input and Output Summary (last 24 hours):     Intake/Output Summary (Last 24 hours) at 2024 1520  Last data filed at 2024 1240  Gross per 24 hour   Intake 540 ml   Output 1350 ml   Net -810 ml       Physical Exam:   Physical Exam  Vitals reviewed.   Constitutional:       General: She is not in acute distress.     Appearance: Normal appearance. She is not ill-appearing.   HENT:      Head: Normocephalic and atraumatic.   Cardiovascular:      Rate and Rhythm: Normal rate and regular rhythm.      Heart sounds: Normal heart sounds.   Pulmonary:      Effort: Pulmonary effort is normal. No respiratory distress.   Abdominal:      General: Bowel sounds are normal.      Tenderness: There is no abdominal tenderness.   Skin:     General: Skin is warm and dry.   Neurological:      Mental Status: She is alert and oriented to person, place, and time. Mental status is at baseline.        Additional Data:     Labs:  Results from last 7 days   Lab Units 24  6310    WBC Thousand/uL 9.65   HEMOGLOBIN g/dL 11.0*   HEMATOCRIT % 34.4*   PLATELETS Thousands/uL 343   NEUTROS PCT % 59   LYMPHS PCT % 25   MONOS PCT % 6   EOS PCT % 9*     Results from last 7 days   Lab Units 02/21/24  0507   SODIUM mmol/L 142   POTASSIUM mmol/L 4.1   CHLORIDE mmol/L 103   CO2 mmol/L 32   BUN mg/dL 10   CREATININE mg/dL 0.55*   ANION GAP mmol/L 7   CALCIUM mg/dL 9.7   GLUCOSE RANDOM mg/dL 161*         Results from last 7 days   Lab Units 02/22/24  1115 02/22/24  0708 02/21/24  2102 02/21/24  1610 02/21/24  1105 02/21/24  0717 02/20/24  2122 02/20/24  1619 02/20/24  1057 02/20/24  0746 02/19/24 2128 02/19/24  1559   POC GLUCOSE mg/dl 187* 158* 153* 141* 217* 154* 149* 141* 186* 152* 232* 137               Lines/Drains:  Invasive Devices       Peripheral Intravenous Line  Duration             Peripheral IV 02/21/24 Dorsal (posterior);Left Forearm 1 day              Drain  Duration             Ureteral Internal Stent Left ureter 6 Fr. 94 days                          Imaging: No pertinent imaging reviewed.    Recent Cultures (last 7 days):         Last 24 Hours Medication List:   Current Facility-Administered Medications   Medication Dose Route Frequency Provider Last Rate    acetaminophen  975 mg Oral Q8H JILLIAN Calogero Bhaskar, DO      albuterol  2 puff Inhalation Q6H PRN Calogero Bhaskar, DO      aspirin  81 mg Oral Daily Calogero Bhaskar, DO      atorvastatin  10 mg Oral Daily With Dinner Calogero Bhaskar, DO      bisacodyl  5 mg Oral Daily PRN Jennifer Jovel MD      buPROPion  300 mg Oral Daily Calogero Bhaskar, DO      cefepime  1,000 mg Intravenous Q12H Calogero Bhaskar, DO 1,000 mg (02/22/24 0615)    clonazePAM  1 mg Oral QPM Calogero Bhaskar, DO      vitamin B-12  1,000 mcg Oral Daily Calogero Bhaskar, DO      fish oil  1,000 mg Oral Daily Calogero Bhaskar, DO      fluticasone-vilanterol  1 puff Inhalation Daily Calogero Bhaskar, DO      insulin lispro  1-5 Units Subcutaneous TID  With Meals Calogero Bhaskar, DO      methocarbamol  250 mg Oral Q8H JILLIAN Calogero Bhaskar, DO      multivitamin-minerals  1 tablet Oral Daily Calogero Bhaskar, DO      nicotine  1 patch Transdermal Daily Calogero Bhaskar, DO      ondansetron  4 mg Intravenous Q6H PRN Calogero Bhaskar, DO      oxybutynin  5 mg Oral Daily Calogero Bhaskar, DO      oxyCODONE  2.5 mg Oral Q4H PRN Calogero Bhaskar, DO      Or    oxyCODONE  5 mg Oral Q4H PRN Calogero Bhaskar, DO      pantoprazole  40 mg Oral Early Morning Calogero Bhaskar, DO      polyethylene glycol  17 g Oral Daily Calogero Bhaskar, DO      pregabalin  200 mg Oral TID Calogero Bhaskar, DO      rivaroxaban  2.5 mg Oral BID With Meals Calogero Bhaskar, DO      senna-docusate sodium  1 tablet Oral BID Jennifer Jovel MD          Today, Patient Was Seen By: Clara Gann DO    **Please Note: This note may have been constructed using a voice recognition system.**

## 2024-02-22 NOTE — ASSESSMENT & PLAN NOTE
Continue cefepime, vancomycin DC'd 2/16/2024  MRSA screen negative  Right groin wound breakdown status post right groin washout, VAC placement on 2/14 and 2/19  Infectious disease following-abx as per them- cont IV cefepime, may need long-term antibiotics to cover possibility of graft infection once cleared by vascular surgery and podiatry.  Cultures + klebsiella and proteus, anaerobic cultures negative growth thus far

## 2024-02-22 NOTE — PLAN OF CARE
Problem: Prexisting or High Potential for Compromised Skin Integrity  Goal: Skin integrity is maintained or improved  Description: INTERVENTIONS:  - Identify patients at risk for skin breakdown  - Assess and monitor skin integrity  - Assess and monitor nutrition and hydration status  - Monitor labs   - Assess for incontinence   - Turn and reposition patient  - Assist with mobility/ambulation  - Relieve pressure over bony prominences  - Avoid friction and shearing  - Provide appropriate hygiene as needed including keeping skin clean and dry  - Evaluate need for skin moisturizer/barrier cream  - Collaborate with interdisciplinary team   - Patient/family teaching  - Consider wound care consult   Outcome: Progressing     Problem: SKIN/TISSUE INTEGRITY - ADULT  Goal: Incision(s), wounds(s) or drain site(s) healing without S/S of infection  Description: INTERVENTIONS  - Assess and document dressing, incision, wound bed, drain sites and surrounding tissue  - Provide patient and family education  - Perform skin care/dressing changes every shift  Outcome: Progressing

## 2024-02-22 NOTE — PHYSICAL THERAPY NOTE
PHYSICAL THERAPY NOTE          Patient Name: Lona Cedeno  Today's Date: 2/22/2024 02/22/24 1440   PT Last Visit   PT Visit Date 02/22/24   Note Type   Note Type Treatment   Education   Education Provided Yes   Pain Assessment   Pain Assessment Tool 0-10   Pain Score No Pain   Restrictions/Precautions   Weight Bearing Precautions Per Order Yes   RLE Weight Bearing Per Order (S)  NWB   Other Precautions Chair Alarm;Bed Alarm;WBS;Multiple lines;Fall Risk;Pain   General   Chart Reviewed Yes   Response to Previous Treatment Patient with no complaints from previous session.   Family/Caregiver Present No   Cognition   Arousal/Participation Alert;Responsive   Attention Within functional limits   Following Commands Follows one step commands without difficulty   Comments Pt cooperative and very pleasant during session   Subjective   Subjective Agreeable to mobilize   Bed Mobility   Supine to Sit 4  Minimal assistance   Additional items Assist x 1;Increased time required;LE management   Sit to Supine Unable to assess   Additional Comments Pt noted to be in bed upon arrival.   Transfers   Sit to Stand 4  Minimal assistance   Additional items Assist x 2;Increased time required;Verbal cues   Stand to Sit 4  Minimal assistance   Additional items Assist x 2;Increased time required;Verbal cues   Stand pivot 4  Minimal assistance   Additional items Assist x 2;Increased time required;Verbal cues   Additional Comments Pt completes STS from EOB with Ax 2, VCs . Completes STS with RW X 1. Pt completes 2nd STS and then SPS from bed to drop arm recliner with B/L HHA + sacral support. Maintains NWB R LE through transfer.   Ambulation/Elevation   Gait pattern Not appropriate   Balance   Static Sitting Fair +   Dynamic Sitting Fair   Static Standing Fair -   Dynamic Standing Poor +   Endurance Deficit   Endurance Deficit Yes   Activity Tolerance   Activity  Tolerance Patient limited by fatigue   Medical Staff Made Aware Elvia Restorative tech present for part of session   Nurse Made Aware RN cleared pt for therapy   Exercises   Hip Flexion Sitting;15 reps;Left  (2 sets)   Knee AROM Long Arc Quad Sitting;15 reps;Bilateral  (2 sets)   Ankle Pumps Sitting;20 reps;Left   UE Exercise   (Bicep curls, Shoulder flexion)   Balance training  EOB sitting with 1 UE support /no UE support   Assessment   Prognosis Fair   Problem List Decreased strength;Decreased endurance;Decreased mobility;Pain;Decreased skin integrity;Decreased safety awareness   Assessment Pt seen for PT treatment session this date. Pt cooperative and very pleasant during session. Pt motivated to participate and verbalizes wanting to get stronger to go home after rehab. Pt able to get to EOB with Min A . Maintains sitting with supervision , completing therapeutic exercises with cues. Pt able to perform 2 STS transfers and then complete a SPS transfer from bed to drop arm recliner with Ax 2, minimal cueing required to maintain WB precautions, pt able to adhere to WB. Pt with increased activity tolerance . Pt making steady progress toward goals. Pt was left in recliner at the end of PT session with all needs in reach. Pt would benefit from continued PT services while in hospital to address remaining limitations. The patient's AM-PAC Basic Mobility Inpatient Short Form Raw Score is 11. A Raw score of less than or equal to 16 suggests the patient may benefit from discharge to post-acute rehabilitation services. Please also refer to the recommendation of the Physical Therapist for safe discharge planning.Post dc recommendation is Level I at this time.   Barriers to Discharge Decreased caregiver support;Inaccessible home environment   Goals   Patient Goals to go to rehab   STG Expiration Date 03/05/24   PT Treatment Day 1   Plan   Treatment/Interventions Functional transfer training;Therapeutic exercise;Bed  mobility;Spoke to nursing;OT;Patient/family training   Progress Progressing toward goals   PT Frequency 3-5x/wk   Discharge Recommendation   Rehab Resource Intensity Level, PT I (Maximum Resource Intensity)   AM-PAC Basic Mobility Inpatient   Turning in Flat Bed Without Bedrails 3   Lying on Back to Sitting on Edge of Flat Bed Without Bedrails 2   Moving Bed to Chair 2   Standing Up From Chair Using Arms 2   Walk in Room 1   Climb 3-5 Stairs With Railing 1   Basic Mobility Inpatient Raw Score 11   Basic Mobility Standardized Score 30.25   Highest Level Of Mobility   JH-HLM Goal 4: Move to chair/commode   JH-HLM Achieved 4: Move to chair/commode   Education   Education Provided Mobility training   Patient Demonstrates verbal understanding   Katerine Burk PT DPT

## 2024-02-22 NOTE — CASE MANAGEMENT
Case Management Discharge Planning Note    Patient name Lona Cedeno  Location [facility] 815/[facility] 815-01 MRN [id number]  : 1945 Date 2024       Current Admission Date: 2024  Current Admission Diagnosis:Surgical site infection   Patient Active Problem List    Diagnosis Date Noted    Other dietary vitamin B12 deficiency anemia 2024    Leukocytosis 2024    Surgical site infection 2024    Sepsis without acute organ dysfunction (HCC) 2024    At risk for venous thromboembolism (VTE) 2024    At high risk for skin breakdown 2024    Wound of skin 2024    Impaired mobility and activities of daily living 2024    Acute pain due to injury 2024    Hematoma 2024    Constipation 2024    Preoperative cardiovascular examination 2024    Diabetic foot ulcer with osteomyelitis ([facility]) 2024    Ankle ulcer, right, with fat layer exposed ([facility]) 2023    Age-related osteoporosis without current pathological fracture 2023    Abnormal CT of the chest 10/17/2023    COPD, severity to be determined ([facility]) 10/17/2023    Tobacco use disorder 10/17/2023    PAD (peripheral artery disease) ([facility]) 10/10/2023    Bladder neoplasm 2023    Left renal mass 2023    Lactic acidosis 2023    Hypomagnesemia 2023    Degenerative lumbar disc 2023    Urinary incontinence 2023    GERD without esophagitis 10/16/2019    Essential hypertension 02/15/2019    Anxiety and depression 02/15/2019    Type 2 diabetes mellitus with diabetic polyneuropathy ([facility]) 2019      LOS (days): 9  Geometric Mean LOS (GMLOS) (days): 5.1  Days to GMLOS:-3.5     OBJECTIVE:  Risk of Unplanned Readmission Score: 21.73         Current admission status: Inpatient   Preferred Pharmacy:   [facility] - [address] [place] [address]  Phone: [phone] Fax: [phone]    [facility]  [place], [place] - [address]  [address]  [place] [address]  Phone: [phone] Fax: [phone]    Primary Care Provider: Vivek Preston DO    Primary Insurance: [facility] REP  Secondary Insurance:     DISCHARGE DETAILS:                             Additional Comments: Insurance authorization received for [facility], bed is available tomorrow Friday 2/23.  CM to follow.

## 2024-02-22 NOTE — CASE MANAGEMENT
[facility] has received approved authorization from insurance: [employer]       Called in by Rep: Jazmine ROCHA#: [phone]  Authorization received for: Acute Rehab  Facility: [facility]   Authorization #: [id number]  Start of Care: 02/22  Next Review Date: 2 Bussiness Days   Submit next review to F#: [phone]   Care Manager notified: Felipa Durham

## 2024-02-22 NOTE — ASSESSMENT & PLAN NOTE
Lab Results   Component Value Date    HGBA1C 6.5 (H) 11/07/2023       Recent Labs     02/21/24  1610 02/21/24  2102 02/22/24  0708 02/22/24  1115   POCGLU 141* 153* 158* 187*         Blood Sugar Average: Last 72 hrs:  (P) [id number]  hold metformin while inpatient  Diabetic diet  Fingersticks QID with sliding scale coverage

## 2024-02-22 NOTE — RESTORATIVE TECHNICIAN NOTE
Restorative Technician Note      Patient Name: Lona Cedeno     Restorative Tech Visit Date: 02/22/24  Note Type: Mobility  Patient Position Upon Consult: Supine  Activity Performed: Transferred; Range of motion  Assistive Device: Other (Comment) (HAx2)  Education Provided: Yes  Patient Position at End of Consult: Bedside chair; All needs within reach; Other (comment) (Left in the care of PT)    Assisted PT with session; see PT notes for details.    Elvia Woods  DPT, Restorative Technician

## 2024-02-22 NOTE — PROGRESS NOTES
Progress Note - Infectious Disease   Lona Cedeno 78 y.o. female MRN: [id number]  Unit/Bed#: [facility] 815-01 Encounter: [id number]      Impression:  1.  Postoperative right groin wound infection with secondary sepsis and with presumptive involvement of recent axillofemoral graft s/p debridement, washout and VAC placement POD 6,1  2.  Type II DM with PAD s/p remote right ankle fracture with ORIF  and right axillary to femoral bypass 2024 with Achilles debridement and partial second toe amputation with hematoma evacuation  3.  COPD  4.  GERD    Recommendations:  Patient is afebrile with normal WBC count of 8,550 and hemoglobin of 10.9 when last taken, picture by vascular surgery  noted.  To discuss therapy with primary service.    1.  So far wound cultures are showing 1+ Klebsiella pneumoniae  and Proteus vulgaris group both susceptible to third-generation cephalosporin.  MRSA nares culture is negative  2.  Continue cefepime 1 g every 12 hours IV postop.  Hopefully will be able to have a reasonable p.o. regimen for long-term therapy to cover possibility of graft infection once she is cleared for discharge by vascular surgery and podiatry  3.  Wound description picture by vascular surgery noted.  Wound appears clean      Antibiotics:  1.  Cefepime 1 g every 12 hours IV, day 9 Rx    Subjective:  Complains of constipation.  No BM x 72 hours on MiraLAX  Denies fevers, chills, or sweats.  Denies nausea, vomiting, or diarrhea.      Objective:  Vitals:  Temp:  [98.1 °F (36.7 °C)-98.8 °F (37.1 °C)] 98.5 °F (36.9 °C)  HR:  [67-73] 73  Resp:  [15-16] 15  BP: (110-119)/(53-78) 110/78  SpO2:  [90 %-94 %] 92 %  Temp (24hrs), Av.5 °F (36.9 °C), Min:98.1 °F (36.7 °C), Max:98.8 °F (37.1 °C)  Current: Temperature: 98.5 °F (36.9 °C)    Physical Exam:     General Appearance:  Eyes: Alert, responsive nontoxic, no acute distress.  Conjunctiva pale   Throat: Oropharynx moist without lesions.  Lips, mucosa, and tongue normal    Neck: Supple, symmetrical, trachea midline, no adenopathy,  no tenderness/mass/nodules   Lungs:   Clear to auscultation bilaterally, no audible wheezes, rhonchi or rales; respirations unlabored   Heart:  Regular rate and rhythm, S1, S2 normal, no murmur, rub or gallop   Abdomen:   Soft, non-tender, non-distended, positive bowel sounds.  No masses, no organomegaly    No CVA tenderness   Extremities: Right groin wound with VAC in place.  Vascular surgery picture of wound 2/21 noted.  Right foot and ankle with dry surgical dressing s/p distal second toe amputation   Skin: Skin color pale, as above.         Invasive Devices       Peripheral Intravenous Line  Duration             Peripheral IV 02/21/24 Dorsal (posterior);Left Forearm <1 day              Drain  Duration             Ureteral Internal Stent Left ureter 6 Fr. 93 days                    Labs, Imaging, & Other studies:   All pertinent labs were personally reviewed  Results from last 7 days   Lab Units 02/21/24  0507 02/19/24  0603 02/17/24  0612   WBC Thousand/uL 9.65 8.55 8.41   HEMOGLOBIN g/dL 11.0* 10.9* 9.8*   PLATELETS Thousands/uL 343 372 358     Results from last 7 days   Lab Units 02/21/24  0507 02/19/24  0603 02/18/24  0645   SODIUM mmol/L 142 143 140   POTASSIUM mmol/L 4.1 4.0 3.9   CHLORIDE mmol/L 103 104 105   CO2 mmol/L 32 32 31   BUN mg/dL 10 9 10   CREATININE mg/dL 0.55* 0.53* 0.55*   EGFR ml/min/1.73sq m 90 91 90   CALCIUM mg/dL 9.7 9.8 9.6

## 2024-02-22 NOTE — PLAN OF CARE
Problem: PHYSICAL THERAPY ADULT  Goal: Performs mobility at highest level of function for planned discharge setting.  See evaluation for individualized goals.  Description: Treatment/Interventions: Functional transfer training, Therapeutic exercise, Bed mobility, Gait training, Spoke to nursing, OT  Equipment Recommended: Wheelchair       See flowsheet documentation for full assessment, interventions and recommendations.  Outcome: Progressing  Note: Prognosis: Fair  Problem List: Decreased strength, Decreased endurance, Decreased mobility, Pain, Decreased skin integrity, Decreased safety awareness  Assessment: Pt seen for PT treatment session this date. Pt cooperative and very pleasant during session. Pt motivated to participate and verbalizes wanting to get stronger to go home after rehab. Pt able to get to EOB with Min A . Maintains sitting with supervision , completing therapeutic exercises with cues. Pt able to perform 2 STS transfers and then complete a SPS transfer from bed to drop arm recliner with Ax 2, minimal cueing required to maintain WB precautions, pt able to adhere to WB. Pt with increased activity tolerance . Pt making steady progress toward goals. Pt was left in recliner at the end of PT session with all needs in reach. Pt would benefit from continued PT services while in hospital to address remaining limitations. The patient's AM-PAC Basic Mobility Inpatient Short Form Raw Score is 11. A Raw score of less than or equal to 16 suggests the patient may benefit from discharge to post-acute rehabilitation services. Please also refer to the recommendation of the Physical Therapist for safe discharge planning.Post dc recommendation is Level I at this time.  Barriers to Discharge: Decreased caregiver support, Inaccessible home environment     Rehab Resource Intensity Level, PT: I (Maximum Resource Intensity)    See flowsheet documentation for full assessment.

## 2024-02-22 NOTE — UTILIZATION REVIEW
Continued Stay Review    Date: 2/22/24                          Current Patient Class: inpatient  Current Level of Care: med surg    HPI:78 y.o. female initially admitted on 2/13/24 Surgical site infection. Right groin wound breakdown status post right groin washout, VAC placement on 2/14 and 2/19     Assessment/Plan:  BM today, continue bowel regimen. Per ID, cont IV cefepime, may need long-term antibiotics to cover possibility of graft infection once cleared by vascular surgery and podiatry. Cultures + klebsiella and proteus, anaerobic cultures negative growth thus far. Patient with right posterior ankle ulcer with exposed Achilles tendon s/p right foot debridement on 2/4/2024 with VAC, right medial ankle ulcer, right second toe osteomyelitis s/p right second toe amputation on 2//2024. Podiatry following, Wound vac MWF -> may come off next change on 2/23. Continue to monitor BP and give home meds. Hold home metformin and continue accuchecks w/ SSI. CM following for dispo planning.      Vital Signs:   Date/Time Temp Pulse Resp BP MAP (mmHg) SpO2 O2 Device Patient Position - Orthostatic VS   02/22/24 14:51:59 98.9 °F (37.2 °C) 72 16 120/65 83 97 % -- --   02/22/24 07:10:16 97.6 °F (36.4 °C) 74 16 130/92 105 95 % None (Room air) Lying   02/22/24 07:09:35 97.6 °F (36.4 °C) 73 -- 130/92 105 96 % -- --   02/21/24 22:55:29 98.8 °F (37.1 °C) 76 18 120/50 73 92 % -- --   02/21/24 15:15:51 98.5 °F (36.9 °C) 73 15 110/78 89 92 % -- --   02/21/24 1515 -- -- -- 110/78 89 -- -- --   02/21/24 1500 -- -- -- -- -- -- None (Room air) --   02/21/24 0800 -- -- -- -- -- -- None (Room air) --   02/21/24 0729 98.1 °F (36.7 °C) -- 16 -- -- -- -- Lying   02/21/24 07:28:07 -- 73 16 -- -- 94 % -- --   02/20/24 21:23:53 98.8 °F (37.1 °C) 67 16 119/53 75 90 % -- --   02/20/24 1644 -- -- -- -- -- 92 % None (Room air) --   02/20/24 15:27:37 99.2 °F (37.3 °C) 71 16 119/52 74 92 % -- --   02/20/24 0945 -- -- -- -- -- -- None (Room air) --    02/20/24 07:37:23 96.9 °F (36.1 °C) Abnormal  72 14 138/73 95 95 % -- --     Pertinent Labs/Diagnostic Results:       Results from last 7 days   Lab Units 02/21/24  0507 02/19/24  0603 02/17/24  0612 02/16/24  0509   WBC Thousand/uL 9.65 8.55 8.41 10.95*   HEMOGLOBIN g/dL 11.0* 10.9* 9.8* 9.6*   HEMATOCRIT % 34.4* 35.3 31.8* 31.4*   PLATELETS Thousands/uL 343 372 358 354   NEUTROS ABS Thousands/µL 5.78  --   --   --          Results from last 7 days   Lab Units 02/21/24  0507 02/19/24  0603 02/18/24  0645 02/17/24  0612 02/16/24  0509   SODIUM mmol/L 142 143 140 140 137   POTASSIUM mmol/L 4.1 4.0 3.9 3.8 4.1   CHLORIDE mmol/L 103 104 105 108 105   CO2 mmol/L 32 32 31 28 26   ANION GAP mmol/L 7 7 4 4 6   BUN mg/dL 10 9 10 10 12   CREATININE mg/dL 0.55* 0.53* 0.55* 0.47* 0.56*   EGFR ml/min/1.73sq m 90 91 90 95 90   CALCIUM mg/dL 9.7 9.8 9.6 9.2 8.7         Results from last 7 days   Lab Units 02/22/24  1115 02/22/24  0708 02/21/24  2102 02/21/24  1610 02/21/24  1105 02/21/24  0717 02/20/24 2122 02/20/24  1619 02/20/24  1057 02/20/24  0746 02/19/24 2128 02/19/24  1559   POC GLUCOSE mg/dl 187* 158* 153* 141* 217* 154* 149* 141* 186* 152* 232* 137     Results from last 7 days   Lab Units 02/21/24  0507 02/19/24  0603 02/18/24  0645 02/17/24  0612 02/16/24  0509   GLUCOSE RANDOM mg/dL 161* 152* 158* 160* 157*     Results from last 7 days   Lab Units 02/16/24  0509   FERRITIN ng/mL 146   IRON SATURATION % 25   IRON ug/dL 63   TIBC ug/dL 257     Medications:   Scheduled Medications:  acetaminophen, 975 mg, Oral, Q8H JILLIAN  aspirin, 81 mg, Oral, Daily  atorvastatin, 10 mg, Oral, Daily With Dinner  buPROPion, 300 mg, Oral, Daily  cefepime, 1,000 mg, Intravenous, Q12H  clonazePAM, 1 mg, Oral, QPM  vitamin B-12, 1,000 mcg, Oral, Daily  fish oil, 1,000 mg, Oral, Daily  fluticasone-vilanterol, 1 puff, Inhalation, Daily  insulin lispro, 1-5 Units, Subcutaneous, TID With Meals  methocarbamol, 250 mg, Oral, Q8H  JILLIAN  multivitamin-minerals, 1 tablet, Oral, Daily  nicotine, 1 patch, Transdermal, Daily  oxybutynin, 5 mg, Oral, Daily  pantoprazole, 40 mg, Oral, Early Morning  polyethylene glycol, 17 g, Oral, Daily  pregabalin, 200 mg, Oral, TID  rivaroxaban, 2.5 mg, Oral, BID With Meals  senna-docusate sodium, 1 tablet, Oral, BID      Continuous IV Infusions: none     PRN Meds:  albuterol, 2 puff, Inhalation, Q6H PRN  bisacodyl, 5 mg, Oral, Daily PRN  ondansetron, 4 mg, Intravenous, Q6H PRN  oxyCODONE, 2.5 mg, Oral, Q4H PRN   Or  oxyCODONE, 5 mg, Oral, Q4H PRN        Discharge Plan: [facility]    Network Utilization Review Department  ATTENTION: Please call with any questions or concerns to [phone] and carefully listen to the prompts so that you are directed to the right person. All voicemails are confidential.   For Discharge needs, contact Care Management DC Support Team at [phone] opt. 2  Send all requests for admission clinical reviews, approved or denied determinations and any other requests to dedicated fax number below belonging to the campus where the patient is receiving treatment. List of dedicated fax numbers for the Facilities:  FACILITY NAME UR FAX NUMBER   ADMISSION DENIALS (Administrative/Medical Necessity) [phone]   DISCHARGE SUPPORT TEAM (NETWORK) [phone]   PARENT CHILD HEALTH (Maternity/NICU/Pediatrics) [phone]   [facility] [phone]   [facility] [phone]   [facility] [phone]   [facility] [phone]   [facility] [phone]   [facility] [phone]   [facility] [phone]   [facility] [phone]   [facility] [phone]   [facility] [phone]   [facility]  [phone]   [facility] [phone]

## 2024-02-22 NOTE — PROGRESS NOTES
Progress Note - Infectious Disease   Lona Cedeno 78 y.o. female MRN: [id number]  Unit/Bed#: [facility] 815-01 Encounter: [id number]      Impression:  1.  Postoperative right groin wound infection with secondary sepsis and with presumptive involvement of recent axillofemoral graft s/p debridement, washout and VAC placement POD 7,2  2.  Type II DM with PAD s/p remote right ankle fracture with ORIF  and right axillary to femoral bypass 2024 with Achilles debridement and partial second toe amputation with hematoma evacuation  3.  COPD  4.  GERD    Recommendations:  Patient is afebrile with normal WBC count of 9,650 and hemoglobin of 11.0 when last taken, picture by vascular surgery  noted.  To discuss therapy with primary service.    1.  So far wound cultures are showing 1+ Klebsiella pneumoniae  and Proteus vulgaris group both susceptible to third-generation cephalosporin.  MRSA nares culture is negative  2.  Continue cefepime 1 g every 12 hours IV.  If no further surgery planned patient could be switched to cefpodoxime 400 mg every 12 hours p.o. for approximately 28-day additional outpatient course  3.  Wound description picture  by vascular surgery noted.  Wound appears clean      Antibiotics:  1.  Cefepime 1 g every 12 hours IV, day 9 Rx    Subjective:  No further constipation, has had BMs yesterday and today  Denies fevers, chills, or sweats.  Denies nausea, vomiting, or diarrhea.      Objective:  Vitals:  Temp:  [97.6 °F (36.4 °C)-98.9 °F (37.2 °C)] 98.9 °F (37.2 °C)  HR:  [72-76] 72  Resp:  [16-18] 16  BP: (120-130)/(50-92) 120/65  SpO2:  [92 %-97 %] 97 %  Temp (24hrs), Av.2 °F (36.8 °C), Min:97.6 °F (36.4 °C), Max:98.9 °F (37.2 °C)  Current: Temperature: 98.9 °F (37.2 °C)    Physical Exam:     General Appearance:  Eyes: Alert, responsive nontoxic, no acute distress.  Conjunctiva pale   Throat: Oropharynx moist without lesions.  Lips, mucosa, and tongue normal   Neck: Supple, symmetrical,  trachea midline, no adenopathy,  no tenderness/mass/nodules   Lungs:   Clear to auscultation bilaterally, no audible wheezes, rhonchi or rales; respirations unlabored   Heart:  Regular rate and rhythm, S1, S2 normal, no murmur, rub or gallop   Abdomen:   Soft, non-tender, non-distended, positive bowel sounds.  No masses, no organomegaly    No CVA tenderness   Extremities: Right groin wound with VAC in place.  Vascular surgery picture of wound 2/21 noted.  Right foot and ankle with dry surgical dressing s/p distal second toe amputation   Skin: Skin color pale, as above.         Invasive Devices       Peripheral Intravenous Line  Duration             Peripheral IV 02/21/24 Dorsal (posterior);Left Forearm 1 day              Drain  Duration             Ureteral Internal Stent Left ureter 6 Fr. 94 days                    Labs, Imaging, & Other studies:   All pertinent labs were personally reviewed  Results from last 7 days   Lab Units 02/21/24  0507 02/19/24  0603 02/17/24  0612   WBC Thousand/uL 9.65 8.55 8.41   HEMOGLOBIN g/dL 11.0* 10.9* 9.8*   PLATELETS Thousands/uL 343 372 358     Results from last 7 days   Lab Units 02/21/24  0507 02/19/24  0603 02/18/24  0645   SODIUM mmol/L 142 143 140   POTASSIUM mmol/L 4.1 4.0 3.9   CHLORIDE mmol/L 103 104 105   CO2 mmol/L 32 32 31   BUN mg/dL 10 9 10   CREATININE mg/dL 0.55* 0.53* 0.55*   EGFR ml/min/1.73sq m 90 91 90   CALCIUM mg/dL 9.7 9.8 9.6

## 2024-02-22 NOTE — ASSESSMENT & PLAN NOTE
Fever/leukocytosis at [facility] prior to arrival here, additionally with lactic acidosis resolved following IV fluids  Concern for surgical site infection at groin, s/p waschout with vac placement on 2/14 with vascular surgery   Received cefepime/vancomycin at [facility] prior to transfer to medical floor-currently on cefepime, vancomycin DC'd 2/16/2024  ID consult appreciated  Blood cultures NGTD  Covid/flu/rsv negative   Wound culture 2/14- (+) Klebsiella and Proteus growing  Anaerobic culture 2/14 no growth thus far  Urine culture-prelim> 100,000 enterobacter cloacae noted to be resistant to cefepime- likely secondary to colonization/asymptomatic bacteruria

## 2024-02-22 NOTE — ASSESSMENT & PLAN NOTE
Patient with right posterior ankle ulcer with exposed Achilles tendon s/p right foot debridement on 2/4/2024 with VAC, right medial ankle ulcer, right second toe osteomyelitis s/p right second toe amputation on 2//2024  Podiatry assistance appreciated  Wound vac MWF -> may come off next change on 2/23

## 2024-02-22 NOTE — CASE MANAGEMENT
Case Management Discharge Planning Note    Patient name Lona Cedeno  Location [facility] 815/[facility] 815-01 MRN [id number]  : 1945 Date 2024       Current Admission Date: 2024  Current Admission Diagnosis:Surgical site infection   Patient Active Problem List    Diagnosis Date Noted    Other dietary vitamin B12 deficiency anemia 2024    Leukocytosis 2024    Surgical site infection 2024    Sepsis without acute organ dysfunction (HCC) 2024    At risk for venous thromboembolism (VTE) 2024    At high risk for skin breakdown 2024    Wound of skin 2024    Impaired mobility and activities of daily living 2024    Acute pain due to injury 2024    Hematoma 2024    Constipation 2024    Preoperative cardiovascular examination 2024    Diabetic foot ulcer with osteomyelitis ([facility]) 2024    Ankle ulcer, right, with fat layer exposed ([facility]) 2023    Age-related osteoporosis without current pathological fracture 2023    Abnormal CT of the chest 10/17/2023    COPD, severity to be determined ([facility]) 10/17/2023    Tobacco use disorder 10/17/2023    PAD (peripheral artery disease) ([facility]) 10/10/2023    Bladder neoplasm 2023    Left renal mass 2023    Lactic acidosis 2023    Hypomagnesemia 2023    Degenerative lumbar disc 2023    Urinary incontinence 2023    GERD without esophagitis 10/16/2019    Essential hypertension 02/15/2019    Anxiety and depression 02/15/2019    Type 2 diabetes mellitus with diabetic polyneuropathy ([facility]) 2019      LOS (days): 9  Geometric Mean LOS (GMLOS) (days): 5.1  Days to GMLOS:-3.3     OBJECTIVE:  Risk of Unplanned Readmission Score: 21.89         Current admission status: Inpatient   Preferred Pharmacy:   [id number] - [address] [place] [address]  Phone: [phone] Fax: [phone]    [facility]  [place], [place] - [address]  [address]  [place] [address]  Phone: [phone] Fax: [phone]    Primary Care Provider: Vivek Preston DO    Primary Insurance: [facility] REP  Secondary Insurance:     DISCHARGE DETAILS:                                                                                                               Facility Insurance Auth Number: [id number]

## 2024-02-22 NOTE — PLAN OF CARE
Problem: SKIN/TISSUE INTEGRITY - ADULT  Goal: Incision(s), wounds(s) or drain site(s) healing without S/S of infection  Description: INTERVENTIONS  - Assess and document dressing, incision, wound bed, drain sites and surrounding tissue  - Provide patient and family education  - Perform skin care/dressing changes every shift  2/22/2024 0045 by Marcellus Wu RN  Outcome: Progressing  2/22/2024 0045 by Marcellus Wu RN  Outcome: Progressing     Problem: INFECTION - ADULT  Goal: Absence or prevention of progression during hospitalization  Description: INTERVENTIONS:  - Assess and monitor for signs and symptoms of infection  - Monitor lab/diagnostic results  - Monitor all insertion sites, i.e. indwelling lines, tubes, and drains  - Monitor endotracheal if appropriate and nasal secretions for changes in amount and color  - [place] appropriate cooling/warming therapies per order  - Administer medications as ordered  - Instruct and encourage patient and family to use good hand hygiene technique  - Identify and instruct in appropriate isolation precautions for identified infection/condition  2/22/2024 0045 by Marcellus Wu RN  Outcome: Progressing  2/22/2024 0045 by Marcellus Wu RN  Outcome: Progressing

## 2024-02-23 ENCOUNTER — HOSPITAL ENCOUNTER (INPATIENT)
Facility: HOSPITAL | Age: 79
LOS: 4 days | DRG: 560 | End: 2024-02-27
Attending: INTERNAL MEDICINE | Admitting: INTERNAL MEDICINE
Payer: COMMERCIAL

## 2024-02-23 ENCOUNTER — TRANSITIONAL CARE MANAGEMENT (OUTPATIENT)
Dept: FAMILY MEDICINE CLINIC | Facility: CLINIC | Age: 79
End: 2024-02-23

## 2024-02-23 VITALS
DIASTOLIC BLOOD PRESSURE: 76 MMHG | HEIGHT: 61 IN | OXYGEN SATURATION: 95 % | TEMPERATURE: 96.6 F | WEIGHT: 151.9 LBS | RESPIRATION RATE: 16 BRPM | SYSTOLIC BLOOD PRESSURE: 123 MMHG | HEART RATE: 67 BPM | BODY MASS INDEX: 28.68 KG/M2

## 2024-02-23 DIAGNOSIS — L97.312 ANKLE ULCER, RIGHT, WITH FAT LAYER EXPOSED (HCC): ICD-10-CM

## 2024-02-23 DIAGNOSIS — R26.2 AMBULATORY DYSFUNCTION: Primary | ICD-10-CM

## 2024-02-23 PROBLEM — R45.87 IMPULSIVE: Status: ACTIVE | Noted: 2024-02-23

## 2024-02-23 PROBLEM — S31.000A SACRAL WOUND: Status: ACTIVE | Noted: 2024-02-23

## 2024-02-23 LAB
ANION GAP SERPL CALCULATED.3IONS-SCNC: 7 MMOL/L
BASOPHILS # BLD AUTO: 0.05 THOUSANDS/ÂΜL (ref 0–0.1)
BASOPHILS NFR BLD AUTO: 1 % (ref 0–1)
BUN SERPL-MCNC: 17 MG/DL (ref 5–25)
CALCIUM SERPL-MCNC: 9.4 MG/DL (ref 8.4–10.2)
CHLORIDE SERPL-SCNC: 100 MMOL/L (ref 96–108)
CO2 SERPL-SCNC: 30 MMOL/L (ref 21–32)
CREAT SERPL-MCNC: 0.59 MG/DL (ref 0.6–1.3)
EOSINOPHIL # BLD AUTO: 0.69 THOUSAND/ÂΜL (ref 0–0.61)
EOSINOPHIL NFR BLD AUTO: 9 % (ref 0–6)
ERYTHROCYTE [DISTWIDTH] IN BLOOD BY AUTOMATED COUNT: 15.3 % (ref 11.6–15.1)
GFR SERPL CREATININE-BSD FRML MDRD: 88 ML/MIN/1.73SQ M
GLUCOSE SERPL-MCNC: 115 MG/DL (ref 65–140)
GLUCOSE SERPL-MCNC: 160 MG/DL (ref 65–140)
GLUCOSE SERPL-MCNC: 167 MG/DL (ref 65–140)
GLUCOSE SERPL-MCNC: 175 MG/DL (ref 65–140)
GLUCOSE SERPL-MCNC: 189 MG/DL (ref 65–140)
HCT VFR BLD AUTO: 35.3 % (ref 34.8–46.1)
HGB BLD-MCNC: 10.9 G/DL (ref 11.5–15.4)
IMM GRANULOCYTES # BLD AUTO: 0.03 THOUSAND/UL (ref 0–0.2)
IMM GRANULOCYTES NFR BLD AUTO: 0 % (ref 0–2)
LYMPHOCYTES # BLD AUTO: 2.03 THOUSANDS/ÂΜL (ref 0.6–4.47)
LYMPHOCYTES NFR BLD AUTO: 25 % (ref 14–44)
MCH RBC QN AUTO: 31.1 PG (ref 26.8–34.3)
MCHC RBC AUTO-ENTMCNC: 30.9 G/DL (ref 31.4–37.4)
MCV RBC AUTO: 101 FL (ref 82–98)
MONOCYTES # BLD AUTO: 0.52 THOUSAND/ÂΜL (ref 0.17–1.22)
MONOCYTES NFR BLD AUTO: 7 % (ref 4–12)
NEUTROPHILS # BLD AUTO: 4.67 THOUSANDS/ÂΜL (ref 1.85–7.62)
NEUTS SEG NFR BLD AUTO: 58 % (ref 43–75)
NRBC BLD AUTO-RTO: 0 /100 WBCS
PLATELET # BLD AUTO: 315 THOUSANDS/UL (ref 149–390)
PMV BLD AUTO: 9.3 FL (ref 8.9–12.7)
POTASSIUM SERPL-SCNC: 3.9 MMOL/L (ref 3.5–5.3)
RBC # BLD AUTO: 3.51 MILLION/UL (ref 3.81–5.12)
SODIUM SERPL-SCNC: 137 MMOL/L (ref 135–147)
WBC # BLD AUTO: 7.99 THOUSAND/UL (ref 4.31–10.16)

## 2024-02-23 PROCEDURE — 99223 1ST HOSP IP/OBS HIGH 75: CPT | Performed by: INTERNAL MEDICINE

## 2024-02-23 PROCEDURE — NC001 PR NO CHARGE: Performed by: STUDENT IN AN ORGANIZED HEALTH CARE EDUCATION/TRAINING PROGRAM

## 2024-02-23 PROCEDURE — 80048 BASIC METABOLIC PNL TOTAL CA: CPT | Performed by: STUDENT IN AN ORGANIZED HEALTH CARE EDUCATION/TRAINING PROGRAM

## 2024-02-23 PROCEDURE — 99239 HOSP IP/OBS DSCHRG MGMT >30: CPT | Performed by: STUDENT IN AN ORGANIZED HEALTH CARE EDUCATION/TRAINING PROGRAM

## 2024-02-23 PROCEDURE — NC001 PR NO CHARGE: Performed by: PODIATRIST

## 2024-02-23 PROCEDURE — 82948 REAGENT STRIP/BLOOD GLUCOSE: CPT

## 2024-02-23 PROCEDURE — 85025 COMPLETE CBC W/AUTO DIFF WBC: CPT | Performed by: STUDENT IN AN ORGANIZED HEALTH CARE EDUCATION/TRAINING PROGRAM

## 2024-02-23 PROCEDURE — 97535 SELF CARE MNGMENT TRAINING: CPT

## 2024-02-23 PROCEDURE — 97110 THERAPEUTIC EXERCISES: CPT

## 2024-02-23 PROCEDURE — 99223 1ST HOSP IP/OBS HIGH 75: CPT | Performed by: STUDENT IN AN ORGANIZED HEALTH CARE EDUCATION/TRAINING PROGRAM

## 2024-02-23 PROCEDURE — 97116 GAIT TRAINING THERAPY: CPT

## 2024-02-23 PROCEDURE — 99232 SBSQ HOSP IP/OBS MODERATE 35: CPT | Performed by: INTERNAL MEDICINE

## 2024-02-23 RX ORDER — ATORVASTATIN CALCIUM 10 MG/1
10 TABLET, FILM COATED ORAL
Status: DISCONTINUED | OUTPATIENT
Start: 2024-02-23 | End: 2024-02-27 | Stop reason: HOSPADM

## 2024-02-23 RX ORDER — METHOCARBAMOL 500 MG/1
250 TABLET, FILM COATED ORAL EVERY 8 HOURS SCHEDULED
Status: DISCONTINUED | OUTPATIENT
Start: 2024-02-23 | End: 2024-02-27 | Stop reason: HOSPADM

## 2024-02-23 RX ORDER — AMOXICILLIN 250 MG
1 CAPSULE ORAL 2 TIMES DAILY
Status: ON HOLD
Start: 2024-02-23

## 2024-02-23 RX ORDER — ONDANSETRON 4 MG/1
4 TABLET, ORALLY DISINTEGRATING ORAL EVERY 6 HOURS PRN
Status: DISCONTINUED | OUTPATIENT
Start: 2024-02-23 | End: 2024-02-27 | Stop reason: HOSPADM

## 2024-02-23 RX ORDER — POLYETHYLENE GLYCOL 3350 17 G/17G
17 POWDER, FOR SOLUTION ORAL DAILY
Status: ON HOLD
Start: 2024-02-24

## 2024-02-23 RX ORDER — AMOXICILLIN 250 MG
1 CAPSULE ORAL 2 TIMES DAILY
Status: DISCONTINUED | OUTPATIENT
Start: 2024-02-23 | End: 2024-02-27 | Stop reason: HOSPADM

## 2024-02-23 RX ORDER — BISACODYL 5 MG/1
5 TABLET, DELAYED RELEASE ORAL DAILY PRN
Status: ON HOLD
Start: 2024-02-23

## 2024-02-23 RX ORDER — BISACODYL 5 MG/1
5 TABLET, DELAYED RELEASE ORAL DAILY PRN
Status: DISCONTINUED | OUTPATIENT
Start: 2024-02-23 | End: 2024-02-27 | Stop reason: HOSPADM

## 2024-02-23 RX ORDER — FLUTICASONE FUROATE AND VILANTEROL 200; 25 UG/1; UG/1
1 POWDER RESPIRATORY (INHALATION)
Status: DISCONTINUED | OUTPATIENT
Start: 2024-02-24 | End: 2024-02-27 | Stop reason: HOSPADM

## 2024-02-23 RX ORDER — ACETAMINOPHEN 325 MG/1
975 TABLET ORAL EVERY 8 HOURS SCHEDULED
Status: ON HOLD
Start: 2024-02-23

## 2024-02-23 RX ORDER — BUPROPION HYDROCHLORIDE 150 MG/1
300 TABLET ORAL DAILY
Status: DISCONTINUED | OUTPATIENT
Start: 2024-02-24 | End: 2024-02-27 | Stop reason: HOSPADM

## 2024-02-23 RX ORDER — INSULIN LISPRO 100 [IU]/ML
1-5 INJECTION, SOLUTION INTRAVENOUS; SUBCUTANEOUS
Status: DISCONTINUED | OUTPATIENT
Start: 2024-02-23 | End: 2024-02-27 | Stop reason: HOSPADM

## 2024-02-23 RX ORDER — PREGABALIN 100 MG/1
200 CAPSULE ORAL 3 TIMES DAILY
Status: DISCONTINUED | OUTPATIENT
Start: 2024-02-23 | End: 2024-02-27 | Stop reason: HOSPADM

## 2024-02-23 RX ORDER — OXYCODONE HYDROCHLORIDE 5 MG/1
5 TABLET ORAL EVERY 4 HOURS PRN
Status: DISCONTINUED | OUTPATIENT
Start: 2024-02-23 | End: 2024-02-27 | Stop reason: HOSPADM

## 2024-02-23 RX ORDER — ALBUTEROL SULFATE 90 UG/1
2 AEROSOL, METERED RESPIRATORY (INHALATION) EVERY 6 HOURS PRN
Status: DISCONTINUED | OUTPATIENT
Start: 2024-02-23 | End: 2024-02-27 | Stop reason: HOSPADM

## 2024-02-23 RX ORDER — PANTOPRAZOLE SODIUM 40 MG/1
40 TABLET, DELAYED RELEASE ORAL
Status: DISCONTINUED | OUTPATIENT
Start: 2024-02-24 | End: 2024-02-27 | Stop reason: HOSPADM

## 2024-02-23 RX ORDER — CEFPODOXIME PROXETIL 200 MG/1
400 TABLET, FILM COATED ORAL 2 TIMES DAILY WITH MEALS
Status: ON HOLD
Start: 2024-02-23 | End: 2024-03-22

## 2024-02-23 RX ORDER — ACETAMINOPHEN 325 MG/1
975 TABLET ORAL EVERY 8 HOURS SCHEDULED
Status: DISCONTINUED | OUTPATIENT
Start: 2024-02-23 | End: 2024-02-27 | Stop reason: HOSPADM

## 2024-02-23 RX ORDER — POLYETHYLENE GLYCOL 3350 17 G/17G
17 POWDER, FOR SOLUTION ORAL DAILY
Status: DISCONTINUED | OUTPATIENT
Start: 2024-02-24 | End: 2024-02-27 | Stop reason: HOSPADM

## 2024-02-23 RX ORDER — NICOTINE 21 MG/24HR
1 PATCH, TRANSDERMAL 24 HOURS TRANSDERMAL DAILY
Status: DISCONTINUED | OUTPATIENT
Start: 2024-02-24 | End: 2024-02-27 | Stop reason: HOSPADM

## 2024-02-23 RX ORDER — CEFPODOXIME PROXETIL 200 MG/1
400 TABLET, FILM COATED ORAL 2 TIMES DAILY WITH MEALS
Qty: 112 TABLET | Refills: 0 | Status: DISCONTINUED | OUTPATIENT
Start: 2024-02-23 | End: 2024-02-27 | Stop reason: HOSPADM

## 2024-02-23 RX ORDER — CHLORAL HYDRATE 500 MG
1000 CAPSULE ORAL DAILY
Status: DISCONTINUED | OUTPATIENT
Start: 2024-02-24 | End: 2024-02-27 | Stop reason: HOSPADM

## 2024-02-23 RX ORDER — CLONAZEPAM 1 MG/1
1 TABLET ORAL EVERY EVENING
Status: DISCONTINUED | OUTPATIENT
Start: 2024-02-23 | End: 2024-02-27 | Stop reason: HOSPADM

## 2024-02-23 RX ORDER — CEFPODOXIME PROXETIL 200 MG/1
400 TABLET, FILM COATED ORAL 2 TIMES DAILY WITH MEALS
Status: DISCONTINUED | OUTPATIENT
Start: 2024-02-23 | End: 2024-02-23 | Stop reason: HOSPADM

## 2024-02-23 RX ORDER — OXYBUTYNIN CHLORIDE 5 MG/1
5 TABLET, EXTENDED RELEASE ORAL DAILY
Status: DISCONTINUED | OUTPATIENT
Start: 2024-02-24 | End: 2024-02-27 | Stop reason: HOSPADM

## 2024-02-23 RX ADMIN — OXYBUTYNIN CHLORIDE 5 MG: 5 TABLET, EXTENDED RELEASE ORAL at 09:19

## 2024-02-23 RX ADMIN — PANTOPRAZOLE SODIUM 40 MG: 40 TABLET, DELAYED RELEASE ORAL at 11:58

## 2024-02-23 RX ADMIN — PREGABALIN 200 MG: 100 CAPSULE ORAL at 17:16

## 2024-02-23 RX ADMIN — ATORVASTATIN CALCIUM 10 MG: 10 TABLET, FILM COATED ORAL at 17:16

## 2024-02-23 RX ADMIN — ASPIRIN 81 MG: 81 TABLET, COATED ORAL at 09:19

## 2024-02-23 RX ADMIN — CEFEPIME 1000 MG: 1 INJECTION, POWDER, FOR SOLUTION INTRAMUSCULAR; INTRAVENOUS at 05:35

## 2024-02-23 RX ADMIN — RIVAROXABAN 2.5 MG: 2.5 TABLET, FILM COATED ORAL at 09:21

## 2024-02-23 RX ADMIN — RIVAROXABAN 2.5 MG: 2.5 TABLET, FILM COATED ORAL at 17:19

## 2024-02-23 RX ADMIN — CYANOCOBALAMIN TAB 500 MCG 1000 MCG: 500 TAB at 09:19

## 2024-02-23 RX ADMIN — PREGABALIN 200 MG: 100 CAPSULE ORAL at 21:12

## 2024-02-23 RX ADMIN — CLONAZEPAM 1 MG: 1 TABLET ORAL at 21:12

## 2024-02-23 RX ADMIN — ACETAMINOPHEN 975 MG: 325 TABLET, FILM COATED ORAL at 05:32

## 2024-02-23 RX ADMIN — OMEGA-3 FATTY ACIDS CAP 1000 MG 1000 MG: 1000 CAP at 09:19

## 2024-02-23 RX ADMIN — METHOCARBAMOL 250 MG: 500 TABLET ORAL at 21:12

## 2024-02-23 RX ADMIN — SENNOSIDES, DOCUSATE SODIUM 1 TABLET: 8.6; 5 TABLET ORAL at 09:19

## 2024-02-23 RX ADMIN — BUPROPION HYDROCHLORIDE 300 MG: 150 TABLET, EXTENDED RELEASE ORAL at 09:19

## 2024-02-23 RX ADMIN — PREGABALIN 200 MG: 100 CAPSULE ORAL at 09:19

## 2024-02-23 RX ADMIN — INSULIN LISPRO 1 UNITS: 100 INJECTION, SOLUTION INTRAVENOUS; SUBCUTANEOUS at 09:20

## 2024-02-23 RX ADMIN — FLUTICASONE FUROATE AND VILANTEROL TRIFENATATE 1 PUFF: 200; 25 POWDER RESPIRATORY (INHALATION) at 09:20

## 2024-02-23 RX ADMIN — Medication 1 TABLET: at 09:19

## 2024-02-23 RX ADMIN — INSULIN LISPRO 1 UNITS: 100 INJECTION, SOLUTION INTRAVENOUS; SUBCUTANEOUS at 17:17

## 2024-02-23 RX ADMIN — METHOCARBAMOL 250 MG: 500 TABLET ORAL at 05:32

## 2024-02-23 RX ADMIN — CEFPODOXIME PROXETIL 400 MG: 200 TABLET, FILM COATED ORAL at 17:19

## 2024-02-23 RX ADMIN — ACETAMINOPHEN 975 MG: 325 TABLET, FILM COATED ORAL at 21:12

## 2024-02-23 RX ADMIN — NICOTINE 1 PATCH: 14 PATCH, EXTENDED RELEASE TRANSDERMAL at 09:23

## 2024-02-23 RX ADMIN — INSULIN LISPRO 1 UNITS: 100 INJECTION, SOLUTION INTRAVENOUS; SUBCUTANEOUS at 11:59

## 2024-02-23 RX ADMIN — METHOCARBAMOL 250 MG: 500 TABLET ORAL at 16:35

## 2024-02-23 RX ADMIN — ACETAMINOPHEN 975 MG: 325 TABLET, FILM COATED ORAL at 16:35

## 2024-02-23 NOTE — ASSESSMENT & PLAN NOTE
Lab Results   Component Value Date    HGBA1C 6.5 (H) 11/07/2023       Recent Labs     02/22/24  1641 02/22/24  2048 02/23/24  0712 02/23/24  1118   POCGLU 105 151* 167* 175*         Blood Sugar Average: Last 72 hrs:  (P) [id number]  hold metformin while inpatient  Diabetic diet  Fingersticks QID with sliding scale coverage

## 2024-02-23 NOTE — ASSESSMENT & PLAN NOTE
Discussed plan with nursing as well as therapy  Patient on last admission was fairly impulsive and not using the call bell appropriately and getting up in the room not following weightbearing restrictions and using the commode.  Discussed this with the patient on evaluation today however need to be vigilant about nursing and therapy checks.  Alarms on relatively sensitive settings.  It would benefit from acute 2-hour checks for bowel and bladder

## 2024-02-23 NOTE — ASSESSMENT & PLAN NOTE
Unstageable wound on the sacrum on initial evaluations on return to the [facility] from acute care  Consult wound care for management, for now Allevyn, weight shifts and turns in bed

## 2024-02-23 NOTE — PLAN OF CARE
Problem: Prexisting or High Potential for Compromised Skin Integrity  Goal: Skin integrity is maintained or improved  Description: INTERVENTIONS:  - Identify patients at risk for skin breakdown  - Assess and monitor skin integrity  - Assess and monitor nutrition and hydration status  - Monitor labs   - Assess for incontinence   - Turn and reposition patient  - Assist with mobility/ambulation  - Relieve pressure over bony prominences  - Avoid friction and shearing  - Provide appropriate hygiene as needed including keeping skin clean and dry  - Evaluate need for skin moisturizer/barrier cream  - Collaborate with interdisciplinary team   - Patient/family teaching  - Consider wound care consult   Outcome: Progressing     Problem: SKIN/TISSUE INTEGRITY - ADULT  Goal: Incision(s), wounds(s) or drain site(s) healing without S/S of infection  Description: INTERVENTIONS  - Assess and document dressing, incision, wound bed, drain sites and surrounding tissue  - Provide patient and family education  - Perform skin care/dressing changes every shift  Outcome: Progressing     Problem: INFECTION - ADULT  Goal: Absence or prevention of progression during hospitalization  Description: INTERVENTIONS:  - Assess and monitor for signs and symptoms of infection  - Monitor lab/diagnostic results  - Monitor all insertion sites, i.e. indwelling lines, tubes, and drains  - Monitor endotracheal if appropriate and nasal secretions for changes in amount and color  - [place] appropriate cooling/warming therapies per order  - Administer medications as ordered  - Instruct and encourage patient and family to use good hand hygiene technique  - Identify and instruct in appropriate isolation precautions for identified infection/condition  Outcome: Progressing

## 2024-02-23 NOTE — RESTORATIVE TECHNICIAN NOTE
Restorative Technician Note      Patient Name: Lona Cedeno     Restorative Tech Visit Date: 02/23/24  Note Type: Mobility  Patient Position Upon Consult: Bedside chair  Activity Performed: Transferred  Assistive Device: Other (Comment) (Avril; YARI SEAY)  Education Provided: Yes  Patient Position at End of Consult: Supine; All needs within reach; Other (comment) (Left in the care of transport staff)    Elvia Woods  DPT, Restorative Technician

## 2024-02-23 NOTE — PHYSICAL THERAPY NOTE
PHYSICAL THERAPY NOTE          Patient Name: Lona Cedeno  Today's Date: 2/23/2024 02/23/24 0850   PT Last Visit   PT Visit Date 02/23/24   Note Type   Note Type Treatment   Education   Education Provided Yes   Pain Assessment   Pain Assessment Tool 0-10   Pain Location/Orientation Orientation: Right;Location: Groin   Pain Onset/Description Onset: Ongoing   Patient's Stated Pain Goal No pain   Hospital Pain Intervention(s) Repositioned;Ambulation/increased activity;Emotional support   Restrictions/Precautions   Weight Bearing Precautions Per Order Yes   RLE Weight Bearing Per Order (S)  NWB   Braces or Orthoses Other (Comment)  (R prevalon boot)   Other Precautions Bed Alarm;Chair Alarm;WBS;Multiple lines;Fall Risk;Pain  (Wound vac X 2- R foot + R groin/abdomen)   General   Chart Reviewed Yes   Response to Previous Treatment Patient with no complaints from previous session.   Family/Caregiver Present No   Cognition   Arousal/Participation Alert;Responsive   Attention Within functional limits   Following Commands Follows all commands and directions without difficulty   Comments Pt cooperative during session. Motivated to participate in therapy   Subjective   Subjective I want to get better   Bed Mobility   Supine to Sit 4  Minimal assistance   Additional items Assist x 1;HOB elevated;Bedrails;Increased time required;Verbal cues;LE management   Sit to Supine Unable to assess   Additional Comments Pt in bed upon arrival. Pt somewhat impulsive at times, requires VCs.   Transfers   Sit to Stand 4  Minimal assistance   Additional items Assist x 2;Increased time required;Verbal cues   Stand to Sit 4  Minimal assistance   Additional items Assist x 2;Increased time required;Verbal cues   Stand pivot 4  Minimal assistance   Additional items Assist x 2;Increased time required;Verbal cues   Additional Comments Pt completes STS with Min AX 2  from bed and then able to perform SPS to drop arm recliner with Ax 2 , B/L HHA , able to maintain NWB on R LE through transfer. Pt then completes 5 addidtional STS from recliner with RW with Min Ax 1, VC for hand placement during initial transfers, then completes with proper technique.   Ambulation/Elevation   Gait pattern Not appropriate   Balance   Static Sitting Fair +   Dynamic Sitting Fair   Static Standing Fair -   Dynamic Standing Poor +   Endurance Deficit   Endurance Deficit Yes   Activity Tolerance   Activity Tolerance Patient limited by fatigue;Patient limited by pain   Medical Staff Made Aware OT Dmitri present for part of session   Nurse Made Aware RN cleared pt for therapy   Exercises   Hip Flexion Sitting;Left;20 reps   Knee AROM Long Arc Quad Sitting;15 reps;Bilateral   Ankle Pumps Sitting;Left;20 reps   Balance training  EOB sitting, STS X5, SPS   Assessment   Prognosis Good   Problem List Decreased strength;Decreased endurance;Decreased mobility;Pain   Assessment Pt seen for PT treatment session this date. Therapy session focused on bed mobility, functional transfers,HEP in order to improve overall mobility and independence. Pt motivated to participate with therapy and states wants to work towards getting home soon. Pt able to complete multiple functional transfers during session with improved quality and decreased assistance level. Pt with increased activity tolerance, but is limited 2* R groin/wound vac location pain + fatigue. Tolerates HEP as detailed in above flow sheet with VC to complete.  Pt making steady progress toward goals. Pt was left in recliner at the end of PT session with all needs in reach. Pt would benefit from continued PT services while in hospital to address remaining limitations. The patient's AM-PAC Basic Mobility Inpatient Short Form Raw Score is 12. A Raw score of less than or equal to 16 suggests the patient may benefit from discharge to post-acute rehabilitation  services. Please also refer to the recommendation of the Physical Therapist for safe discharge planning. Post dc recommendation is Level I at this time.   Barriers to Discharge Decreased caregiver support;Inaccessible home environment   Goals   Patient Goals to get better   STG Expiration Date 03/05/24   PT Treatment Day 2   Plan   Treatment/Interventions Functional transfer training;Patient/family training;Bed mobility;Spoke to nursing;OT   Progress Progressing toward goals   PT Frequency 3-5x/wk   Discharge Recommendation   Rehab Resource Intensity Level, PT I (Maximum Resource Intensity)   Equipment Recommended Wheelchair  (TBD)   AM-PAC Basic Mobility Inpatient   Turning in Flat Bed Without Bedrails 3   Lying on Back to Sitting on Edge of Flat Bed Without Bedrails 2   Moving Bed to Chair 2   Standing Up From Chair Using Arms 3   Walk in Room 1   Climb 3-5 Stairs With Railing 1   Basic Mobility Inpatient Raw Score 12   Basic Mobility Standardized Score 32.23   Highest Level Of Mobility   JH-HLM Goal 4: Move to chair/commode   JH-HLM Achieved 5: Stand (1 or more minutes)   Education   Education Provided Mobility training   Patient Demonstrates verbal understanding   End of Consult   Patient Position at End of Consult All needs within reach;Bed/Chair alarm activated;Bedside chair   Katerine Burk PT DPT

## 2024-02-23 NOTE — ASSESSMENT & PLAN NOTE
Patient with fever and leukocytosis while at [facility] prior to send out and lactic acidosis but this resolved with IV fluids  There was concern for surgical site infection in the groin status post washout and VAC placement as listed above and surgical site infection  No growth with blood cultures however wound cultures positive for Proteus and Klebsiella  Urine culture growing greater than 100,000 CFU Enterobacter resistant to cefepime but thought to be not related to her symptoms and asymptomatic bacteriuria.  Need to consider following this further depending on her clinical course

## 2024-02-23 NOTE — TREATMENT PLAN
Individualized Plan of Care - [facility]  Lona Cedeno 78 y.o. female MRN: [id number]  Unit/Bed#: [facility] 972-01 Encounter: [id number]     PATIENT INFORMATION  ADMISSION DATE: 2/23/2024  2:00 PM ANGELINA CATEGORY:Other Disabling Impairments:  13  Other Disabling Impairments   ADMISSION DIAGNOSIS: Diabetic foot ulcer (HCC) [E11.621, L97.509]  Osteomyelitis (HCC) [M86.9]  EXPECTED LOS: 10 to 14 days     MEDICAL/FUNCTIONAL PROGNOSIS  Based on my assessment of the patient's medical conditions and current functional status, the prognosis for attaining medical and functional goals or the IRF stay is:  Good    Medical Goals: Patient will be medically stable for discharge to [facility] upon completion of rehab program and Patient will be able to manage medical conditions and comorbid conditions with medications and follow up upon completion of rehab program    Cardiopulmonary function/Anemia: Ensure cardiopulmonary stability and optimize cardiopulmonary function not only at rest but with activity as patient's activity level significantly increases in acute rehab compared with prior to transfer in preparation for safe discharge from [facility].  Must closely and frequently monitor blood pressure, HR, anemia to ensure adequate cardiac output during ADLs and ambulation as patient is at increased risk for orthostatic hypotension/syncope and potential injury if not monitored for and managed adequately.    Blood pressure management:    Frequent monitoring of blood pressure with appropriate adjustments in blood pressure medication management to optimize blood pressure control and prevent/limit renal complications.   Monitoring impact of blood pressure and side-effects of blood pressure medications at rest and with activity.  Hypoxia prevention: Ensure appropriate level of oxygenation at rest and with activity to avoid symptomatic hypoxia, maximize functional performance, and decrease risk of  atelectasis/pneumonia through close and frequent monitoring, providing appropriate respiratory treatments (such as incentive spirometry), and when necessary provide/adjust respiratory medications.    DM: Patient will improve/maintain blood sugar control to ensure optimal healing and decrease risks of complications associated with DM through medication monitoring, adjustments as indicated, and optimal dietary intake and education.  Pain management:  Pain will improve with frequent evaluation of pain, careful adjustments in medications, frequent re-evaluation of patient's pain and medical/neurologic status to ensure optimal pain control, avoidance of potential serious and even life-threatening side-effects and drug interactions, as well as weaning pain medications as soon as possible to decrease risk of short and long-term use.     Cognitive impairment: intensive skilled therapies including speech language pathology and occupation therapy to evaluate and treat deficits in cognition.  Close oversight and management by rehab specialized physician of cognitive deficits and potential confounding factors (prevention of, monitoring for, and if found treatment of possible complications such as infections, as well as optimally managing sleep, mood, and medications which can negatively impact cognition and functional recovery).  Orthopedic Disorder: Right groin and foot wound with wound vacs also with amputation right toe causing impaired mobility, ADLs, and gait:  intensive skilled therapies with physical therapy and occupational therapy with close oversight and management by rehab specialized physician in acute rehabilitation setting to most expeditiously and effectively improve functional mobility, transfers, upper and lower body strengthening, conditioning, balance, and gait training with appropriate assistive device.  Patient will have optimal supervision and management of patient's underlying orthopedic disorder with  specialized rehabilitation physician during this period of recovery to ensure most expeditious and optimal recovery with decreased risks of fall/injury and other complications including acute worsening of ortho disorder, decrease risk of VTE, PNA, and skin ulceration.  Inpatient rehabilitation education/teaching:  To be provided to patient and typically family/caregiver (if able to be identified) by all skilled therapists, rehab nursing, case management, and rehab specialized physician to ensure optimal recovery and decrease risks of complications in both acute rehabilitation setting as well as after discharge.   Anxiety (and/or Depression): Patient's mood and it's impact on therapy participation and functional recovery will improve during course with supportive counseling, relaxation/breathing techniques and if necessary medication management.  Requires frequent re-assessment and close management to ensure anxiety/depression management during acute rehab course with planning for appropriate outpatient management to ensure optimal mental health and functional recovery.    Bladder dysfunction:  Appropriate bladder management with appropriate toileting program from rehab nursing and staff with oversight management by rehabilitation physicians which include appropriate monitoring and possible adjustments in medications to with goals to optimize bladder function and decrease risk of bladder retention, incontinence, and urinary tract infection.   Bowel dysfunction: Appropriate bowel management with appropriate toileting program from rehab nursing and staff with oversight management by rehabilitation physicians which include adjustments in medications to optimize appropriate bowel function and prevent/decrease risk of constipation and bowel obstruction.    Skin wounds: Appropriate skin checks for wound/skin evaluation including evaluation of healing, worsening of wounds, or signs of infection.   Wound care management from  rehab nursing, wound care nursing, physicians.  Ensure frequent appropriate turning, positioning in bed, in chair, when mobilizing, and when appropriate with use of appropriate devices to optimize healing and decrease risk of worsening or new skin breakdown.        ANTICIPATED DISCHARGE DISPOSITION AND SERVICES  COMMUNITY SETTING: Home - independent/modified independent    ANTICIPATED FOLLOW-UP SERVICE:   Home Health Services: PT, OT, and Nursing    DISCIPLINE SPECIFIC PLANS:  Required Disciplines & Services: Rehabillitation Nursing, Case Management, Dietay/Nutrition, and Diabetes Education    REQUIRED THERAPY:  Therapy Hours per Day Days per Week Total Days   Physical Therapy 1.5 5 10-14   Occupational Therapy 1.5 5 10-14   NOTE: Additional therapy time(s) or changes to allocation of therapies as appropriate to meet patient needs and to achieve functional goals.      Patient will participate in above therapy regimen consisting of PT and OT due to the following medical procedure/condition:Other Disabling Impairments:  13  Other Disabling Impairments    ANTICIPATED FUNCTIONAL OUTCOMES:  ADL:  Supervision to min assist for lower body ADLs, modified independent for upper body   Bladder/Bowel:  Modified independent   Transfers:  Modified independent   Locomotion:  Modified independent short distances, modified independent in wheelchair for longer distances   Cognitive:  Supervision     DISCHARGE PLANNING NEEDS  Equipment needs: Discharge needs to be reviewed with team      REHAB ANTICIPATED PARTICIPATION RESTRICTIONS:  Amubulate Short Distances Only, Assist with Bathing in Tub, Assist with Mobility, Decreased Insight to Deficits, Decreaed Safety Awareness, Rquires Assist with ADLS, Requires Assit with Homemaking, Requires Assit with Steps, and Non-Weight Bearing YARI Hoover, DO  Physical Medicine and Rehabilitation  [facility]

## 2024-02-23 NOTE — ASSESSMENT & PLAN NOTE
Fever/leukocytosis at [facility] prior to arrival here, additionally with lactic acidosis resolved following IV fluids  Concern for surgical site infection at groin, s/p waschout with vac placement on 2/14 with vascular surgery   Received cefepime/vancomycin at [facility] prior to transfer to medical floor-currently on cefepime, vancomycin DC'd 2/16/2024  ID consult appreciated  Blood cultures NGTD  Covid/flu/rsv negative   Wound culture 2/14- (+) Klebsiella and Proteus growing  Anaerobic culture 2/14 no growth thus far  Urine culture-prelim> 100,000 enterobacter cloacae noted to be resistant to cefepime- likely secondary to colonization/asymptomatic bacteruria

## 2024-02-23 NOTE — ASSESSMENT & PLAN NOTE
Needed on cefepime for surgical site infection however vancomycin was discontinued on 2/16  Right groin breakdown status post wound washout with VAC placement on 2/14 and 2/19  Was followed by infectious disease and continued on cefepime with plan to potentially transition to cefpodoxime 400 mg twice daily for 4 weeks  Cultures positive for Klebsiella and Proteus  Patient will need ongoing physical and occupational therapy 3 hours/day 5 to 7 days a week for ambulatory dysfunction and ADL deficits especially in the setting of multiple surgeries and wound vacs x 2  Ongoing rehab physician and nursing oversight for management as well as training for transition back to the community

## 2024-02-23 NOTE — CASE MANAGEMENT
Case Management Discharge Planning Note    Patient name Lona Cedeno  Location [facility] 815-01 MRN [id number]  : 1945 Date 2024       Current Admission Date: 2024  Current Admission Diagnosis:Surgical site infection   Patient Active Problem List    Diagnosis Date Noted    Other dietary vitamin B12 deficiency anemia 2024    Leukocytosis 2024    Surgical site infection 2024    Sepsis without acute organ dysfunction (HCC) 2024    At risk for venous thromboembolism (VTE) 2024    At high risk for skin breakdown 2024    Wound of skin 2024    Impaired mobility and activities of daily living 2024    Acute pain due to injury 2024    Hematoma 2024    Constipation 2024    Preoperative cardiovascular examination 2024    Diabetic foot ulcer with osteomyelitis ([facility]) 2024    Ankle ulcer, right, with fat layer exposed ([facility]) 2023    Age-related osteoporosis without current pathological fracture 2023    Abnormal CT of the chest 10/17/2023    COPD, severity to be determined ([facility]) 10/17/2023    Tobacco use disorder 10/17/2023    PAD (peripheral artery disease) ([facility]) 10/10/2023    Bladder neoplasm 2023    Left renal mass 2023    Lactic acidosis 2023    Hypomagnesemia 2023    Degenerative lumbar disc 2023    Urinary incontinence 2023    GERD without esophagitis 10/16/2019    Essential hypertension 02/15/2019    Anxiety and depression 02/15/2019    Type 2 diabetes mellitus with diabetic polyneuropathy ([facility]) 2019      LOS (days): 10  Geometric Mean LOS (GMLOS) (days): 5.1  Days to GMLOS:-4.4     OBJECTIVE:  Risk of Unplanned Readmission Score: 21.76         Current admission status: Inpatient   Preferred Pharmacy:   [id number] - [address] [place] [id number]  Phone: [phone] Fax: [phone]    [facility]  [place] [place] [facility] [address]  [address]  [place] [address]  Phone: [phone] Fax: [phone]    Primary Care Provider: Vivek Preston DO    Primary Insurance: [facility] RE[facility]  Secondary Insurance:     DISCHARGE DETAILS:                 ETA of Transport (Date): 02/23/24  ETA of Transport (Time): 1330            Family notified::  Carmelo  Additional Comments: Patient cleared for discharge today to [facility], Room 963, transport at 1:30 PM.  Patient, , nursing, provider and facility aware of d/c arrangements.    Accepting Facility Name, City & State : [facility]  Receiving Facility/Agency Phone Number: [facility] charge RN

## 2024-02-23 NOTE — ASSESSMENT & PLAN NOTE
Acetaminophen 975 mg every 8 hours  Lyrica 200 mg 3 times daily  Robaxin 250 mg every 8 hours  Oxycodone 2.5-5 mg every 4 hours as needed

## 2024-02-23 NOTE — ASSESSMENT & PLAN NOTE
Continue cefepime, vancomycin DC'd 2/16/2024  MRSA screen negative  Right groin wound breakdown status post right groin washout, VAC placement on 2/14 and 2/19  Infectious disease following  Received IV cefepime while inpatient and transition to cefpodoxime 400 mg twice daily for additional 4 weeks  Cultures + klebsiella and proteus, anaerobic cultures negative growth thus far

## 2024-02-23 NOTE — OCCUPATIONAL THERAPY NOTE
Occupational Therapy Progress Note     Patient Name: Lona Cedeno  Today's Date: 2/23/2024  Problem List  Principal Problem:    Surgical site infection  Active Problems:    Type 2 diabetes mellitus with diabetic polyneuropathy (HCC)    Essential hypertension    PAD (peripheral artery disease) (HCC)    COPD, severity to be determined (HCC)    Tobacco use disorder    Diabetic foot ulcer with osteomyelitis (HCC)    Constipation    Sepsis without acute organ dysfunction (HCC)    Other dietary vitamin B12 deficiency anemia          02/23/24 0915   OT Last Visit   OT Visit Date 02/23/24   Note Type   Note Type Treatment   Pain Assessment   Pain Assessment Tool 0-10   Pain Location/Orientation Orientation: Right;Location: Foot;Location: Abdomen   Pain Onset/Description Onset: Ongoing   Patient's Stated Pain Goal No pain   Hospital Pain Intervention(s) Repositioned;Ambulation/increased activity;Emotional support;Elevated   Restrictions/Precautions   Weight Bearing Precautions Per Order Yes   RLE Weight Bearing Per Order (S)  NWB   Braces or Orthoses Other (Comment)  (prevalon boot R LE)   Other Precautions Cognitive;Bed Alarm;Chair Alarm;WBS;Multiple lines;Fall Risk;Pain   Lifestyle   Autonomy Pt reports being IND with ADLs/IADLs PTA with SPC for stairs, rollator for community distances. 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   Service to Others Pt is retired   Intrinsic Gratification Pt enjoys reading   ADL   Where Assessed Edge of bed   Eating Assistance 5  Supervision/Setup   Grooming Assistance 5  Supervision/Setup   Grooming Deficit Increased time to complete;Wash/dry hands;Wash/dry face;Brushing hair   UB Bathing Assistance 4  Minimal Assistance   UB Bathing Deficit Setup;Increased time to complete;Chest;Right arm;Left arm;Abdomen   UB Bathing Comments Simulated bathing with min A for UB and washing back   LB Bathing Assistance 4  Minimal Assistance   LB  "Bathing Deficit Buttocks;Perineal area;Right upper leg;Left upper leg;Right lower leg including foot;Left lower leg including foot   LB Bathing Comments Simulated bathing with Min A for LE   UB Dressing Assistance 4  Minimal Assistance   UB Dressing Deficit Thread LUE;Thread RUE;Pull around back   LB Dressing Assistance 2  Maximal Assistance   LB Dressing Deficit Don/doff L sock   Bed Mobility   Supine to Sit 4  Minimal assistance   Additional items Assist x 1;HOB elevated;Bedrails;Increased time required;Verbal cues   Sit to Supine Unable to assess   Additional Comments Pt requiring VCs for pacing through activity, Min A for bed mobility. Pt seated OOB in recliner post session with RN attending   Transfers   Sit to Stand 4  Minimal assistance   Additional items Assist x 2;Increased time required;Verbal cues   Stand to Sit 4  Minimal assistance   Additional items Assist x 2;Increased time required;Verbal cues   Stand pivot 4  Minimal assistance   Additional items Assist x 2;Increased time required;Verbal cues   Additional Comments Min Ax2 with b/l HHA, VCs for pacing through steps and recalling WBS.   Functional Mobility   Functional Mobility 4  Minimal assistance   Additional Comments Ax2, VCs to maintain NWB status   Additional items Hand hold assistance   Subjective   Subjective \"I just want to go home\"   Cognition   Overall Cognitive Status Impaired   Arousal/Participation Alert;Cooperative   Attention Attends with cues to redirect   Orientation Level Oriented to person;Oriented to place;Oriented to situation   Memory Decreased recall of precautions   Following Commands Follows one step commands without difficulty   Comments Pt pleasant and cooperative, requiring VCs for WBS and safety awareness. VCs to maintain attention to task.   Activity Tolerance   Activity Tolerance Patient limited by fatigue;Patient limited by pain   Medical Staff Made Aware RN cleared for therapy, PT Katerine   Assessment   Assessment " Patient participated in Skilled OT session this date with interventions consisting of ADL re training with the use of correct body mechnaics, Energy Conservation techniques, safety awareness and fall prevention techniques,  therapeutic activities to: increase activity tolerance, and increase OOB/ sitting tolerance . Patient agreeable to OT treatment session, upon arrival patient was found seated OOB to Chair, supine in bed, alert, responsive , and in no apparent distress.  In comparison to previous session, patient with improvements in ADL completion, functional mobility. Patient requiring verbal cues for safety, frequent rest periods, and ocassional safety reminders. Pt pleasant and cooperative, motivated to participate. Pt completed functional bed mobility with min Ax1. Min Ax2 for functional STS txfs with b/l HHA. Min Ax2 for functional SPT to chair with drop arm. Pt requiring VCs to recall WBS. Pt required SUP for grooming to wash hands/face. Min A for UB bathing and LB bathing (pt completed simulated task 2/2 being recently washed up). Pt required min A for UB dressing to don/doff gown. Max A for LB dressing to don/doff L sock. Patient continues to be functioning below baseline level, occupational performance remains limited secondary to factors listed above and increased risk for falls and injury. The patient's raw score on the AM-PAC Daily Activity Inpatient Short Form is 17. A raw score of less than 19 suggests the patient may benefit from discharge to post-acute rehabilitation services. Please refer to the recommendation of the Occupational Therapist for safe discharge planning. From OT standpoint, recommendation at time of d/c would be Level 1 resources.  Patient to benefit from continued Occupational Therapy treatment while in the hospital to address deficits as defined above and maximize level of functional independence with ADLs and functional mobility.   Plan   Treatment Interventions ADL  retraining;Functional transfer training;Endurance training;Cognitive reorientation;Patient/family training;Compensatory technique education;Continued evaluation;Energy conservation;Activityengagement   Goal Expiration Date 03/05/24   OT Treatment Day 1   OT Frequency 2-3x/wk   Discharge Recommendation   Rehab Resource Intensity Level, OT I (Maximum Resource Intensity)   AM-PAC Daily Activity Inpatient   Lower Body Dressing 2   Bathing 2   Toileting 3   Upper Body Dressing 3   Grooming 3   Eating 4   Daily Activity Raw Score 17   Daily Activity Standardized Score (Calc for Raw Score >=11) 37.26   AM-PAC Applied Cognition Inpatient   Following a Speech/Presentation 3   Understanding Ordinary Conversation 4   Taking Medications 3   Remembering Where Things Are Placed or Put Away 3   Remembering List of 4-5 Errands 3   Taking Care of Complicated Tasks 3   Applied Cognition Raw Score 19   Applied Cognition Standardized Score 39.77   End of Consult   Education Provided Yes   Patient Position at End of Consult Bedside chair;All needs within reach;Other (comment)  (Pt left with RN in room, attending pt.)   Nurse Communication Nurse aware of consult       MARTIN Beaver, OTR/L

## 2024-02-23 NOTE — PROGRESS NOTES
[facility] - Progress Note  Patient: Lona eCdeno 78 y.o. female   MRN: [id number]  PCP: Vivek Preston,   Unit/Bed#: [facility] 815-01 Encounter: [id number]  Date Of Visit: 24    ASSESSMENT:    Lona Cedeno is a 78 y.o. 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 Right foot wound, 2nd digit surgical site and posterior ankle wound noted to be stable with no concern for acute infection at this time.   Posterior ankle wound noted to be granulating well with continued vac therapy.   Suture removal performed at right 2nd digit amputation site. See procedure note below.   Patient is stable from a podiatry standpoint for discharge.   Will continue with [facility] wound vac changes while patient is in house. Plan for next vac change 2024.    Podiatry to do all wound vac changes.   Discussed with patient possible need for STSG at a later date. Patient was amenable to this plan.   Elevation on green foam wedges or pillows when non-ambulatory.  Rest of care per primary team.    Wound VAC application  1. Number of sponges: 1  2. Pressure settin continuous  3. Size of wound: 5.6 cm x 4.0 cm 0.2 cm    4. Description of wound: granular with bleeding of wound bed     Procedure Note: Suture Removal   After verbal consent was obtained, patient's sutures were removed at bedside using a suture removal kit from prior right 2nd digit amputation site. The patient tolerated this well and without incident.     Weight bearing status: Non weightbearing to right foot. WBAT to left foot.        SUBJECTIVE:     The patient was seen, evaluated, and assessed at bedside today. The patient was awake, alert, and in no acute distress.  "No acute events overnight. The patient reports she is feeling well today. Patient denies N/V/F/chills/SOB/CP.      OBJECTIVE:     Vitals:   /76   Pulse 67   Temp (!) 96.6 °F (35.9 °C)   Resp 16   Ht 5' 1\" (1.549 m)   Wt 68.9 kg (151 lb 14.4 oz)   SpO2 95%   BMI 28.70 kg/m²     Temp (24hrs), Av.9 °F (36.6 °C), Min:96.6 °F (35.9 °C), Max:98.9 °F (37.2 °C)      Physical Exam:     General:  Alert, cooperative, and in no distress.  Lower extremity exam:  Cardiovascular status at baseline.  Neurological status at baseline.  S/p right 2nd digit amputation. No calf tenderness noted.     Lower extremity wound(s) as noted below:    Wound #: 1  Location: Right posterior ankle  Length 5.8 cm: Width 4 cm: Depth 0.2 cm:   Deepest Tissue Noted in Base: tendon  Probe to Bone: No  Peripheral Skin Description: Attached  Granulation: 100% Fibrotic Tissue: 0% Necrotic Tissue: 0%   Drainage Amount: moderate bloody  Signs of Infection: No     Wound #: 2  Location: Right medial foot  Length 2 cm: Width 2.8 cm: Depth 0.1 cm:   Deepest Tissue Noted in Base: subcutaneous  Probe to Bone: No  Peripheral Skin Description: Attached  Granulation: 100% Fibrotic Tissue: 0% Necrotic Tissue: 0%   Drainage Amount: minimal  Signs of Infection: No    Clinical Images 24:                Additional Data:     Labs:    Results from last 7 days   Lab Units 24  0553   WBC Thousand/uL 7.99   HEMOGLOBIN g/dL 10.9*   HEMATOCRIT % 35.3   PLATELETS Thousands/uL 315   NEUTROS PCT % 58   LYMPHS PCT % 25   MONOS PCT % 7   EOS PCT % 9*     Results from last 7 days   Lab Units 24  0553   POTASSIUM mmol/L 3.9   CHLORIDE mmol/L 100   CO2 mmol/L 30   BUN mg/dL 17   CREATININE mg/dL 0.59*   CALCIUM mg/dL 9.4           * I Have Reviewed All Lab Data Listed Above.    Recent Cultures (last 7 days):               Imaging: I have personally reviewed pertinent films in PACS  EKG, Pathology, and Other Studies: I have personally reviewed pertinent " "reports.      ** Please Note: Portions of the record may have been created with voice recognition software. Occasional wrong word or \"sound a like\" substitutions may have occurred due to the inherent limitations of voice recognition software. Read the chart carefully and recognize, using context, where substitutions have occurred. **      "

## 2024-02-23 NOTE — CONSULTS
PHYSICAL MEDICINE AND REHABILITATION CONSULT NOTE  Lona Cedeno 78 y.o. female MRN: [id number]  Unit/Bed#: -01 Encounter: [id number]     Rehab Diagnosis: Impairment of mobility, safety and Activities of Daily Living (ADLs) due to Other Disabling Impairments:  13  Other Disabling Impairments    Etiologic: Diabetic right foot ulcer with osteomyelitis s/p fem bypass, right foot wound and achilles debridement with 2nd toe partial amputation and VAC placement   Date of Onset: 2/26/24     Date of surgery: 1/31, 2/4, 2/14       History of Present Illness:   Lona Cedeno is a 78 y.o. female with history of anxiety, closed fracture of the coccyx back in May 2023, diabetes, GERD, hyperlipidemia, hypertension, IBS who presented to the [facility] on 1/23 from her podiatry office with a diabetic ulcer of the right toe and right ankle with exposed Achilles tendon.  She was found to have osteomyelitis of the right foot and transferred to [facility] to consider revascularization and surgical options.  She was recommended to undergo extra-anatomic bypass to the right axillary to family artery and a right transtibial amputation was initially discussed but decision was made for attempted limb salvage.  She underwent the above bypass on 1/31/2024 as well as on 2/4/2024 and underwent right foot wound and Achilles debridement with second toe partial amputation and VAC placement.  Split thickness skin graft was discussed with podiatry and plastic surgery but deferred to a later time.  Her course was complicated by a right chest wall hematoma status post evacuation with vascular on 2/4.  On 2/13 the patient was discharged back to the acute care hospital due to fever and leukocytosis from a potential infection at the surgical site in her groin.  She underwent washout with VAC placement on 2/14 and 2/19 and remained on IV antibiotics.    Plan:     Rehabilitation  Functional deficits: impaired mobility, self  care  Begin PT/OT/SLP.  Rehabilitation goals are to achieve a modified independent to supervision level with mobility and self care.  Prognosis is fair to good.  ELOS is 10 to 14 days.  Estimated discharge is home.     DVT prophylaxis  On Xarelto    Pain  Acetaminophen 975 mg every 8 hours  Lyrica 200 mg 3 times daily  Robaxin 250 mg every 8 hours  Oxycodone 2.5-5 mg every 4 hours as needed    Bladder plan  Continent    Bowel plan  Continent  Last BM 2/22    Code Status  Level 3, DNAR/DNI      * Surgical site infection  Assessment & Plan  Needed on cefepime for surgical site infection however vancomycin was discontinued on 2/16  Right groin breakdown status post wound washout with VAC placement on 2/14 and 2/19  Was followed by infectious disease and continued on cefepime with plan to potentially transition to cefpodoxime 400 mg twice daily for 4 weeks  Cultures positive for Klebsiella and Proteus  Patient will need ongoing physical and occupational therapy 3 hours/day 5 to 7 days a week for ambulatory dysfunction and ADL deficits especially in the setting of multiple surgeries and wound vacs x 2  Ongoing rehab physician and nursing oversight for management as well as training for transition back to the community    Impulsive  Assessment & Plan  Discussed plan with nursing as well as therapy  Patient on last admission was fairly impulsive and not using the call bell appropriately and getting up in the room not following weightbearing restrictions and using the commode.  Discussed this with the patient on evaluation today however need to be vigilant about nursing and therapy checks.  Alarms on relatively sensitive settings.  It would benefit from acute 2-hour checks for bowel and bladder    Sacral wound  Assessment & Plan  Unstageable wound on the sacrum on initial evaluations on return to the [facility] from acute care  Consult wound care for management, for now Allevyn, weight shifts and turns in bed    Other dietary  vitamin B12 deficiency anemia  Assessment & Plan  Continue B12 supplementation    Sepsis without acute organ dysfunction (HCC)  Assessment & Plan  Patient with fever and leukocytosis while at the [facility] prior to send out and lactic acidosis but this resolved with IV fluids  There was concern for surgical site infection in the groin status post washout and VAC placement as listed above and surgical site infection  No growth with blood cultures however wound cultures positive for Proteus and Klebsiella  Urine culture growing greater than 100,000 CFU Enterobacter resistant to cefepime but thought to be not related to her symptoms and asymptomatic bacteriuria.  Need to consider following this further depending on her clinical course    Acute pain due to injury  Assessment & Plan  Acetaminophen 975 mg every 8 hours  Lyrica 200 mg 3 times daily  Robaxin 250 mg every 8 hours  Oxycodone 2.5-5 mg every 4 hours as needed    Impaired mobility and activities of daily living  Assessment & Plan  - Rehabilitation medicine physician for daily monitoring of care, 24 hour availability for acute  medical issues, medication management, and therapeutic and diagnostic assessments.  - 24 hour rehabilitation nursing 7 days per week for: management/teaching of medications,  bowel/bladder routine, skin care.  - PT, OT for 2-3 hours per day, 5 to 6 days per week; 15 hours per week  - Rehabilitation Psychology for adjustment and coping  - MSW for barriers to discharge, community resources, and family support  - Discharge planning following to help ensure a safe and efficient discharge    Anemia  Assessment & Plan  He will most recently at 10.9 has been improving over the last week  Continue to monitor with biweekly labs or sooner if clinically indicated  Continue B12    Diabetic foot ulcer with osteomyelitis (HCC)  Assessment & Plan  Patient with right posterior ankle ulcer with exposed Achilles tendon s/p right foot debridement on 2/4/2024 with  VAC, right medial ankle ulcer, right second toe osteomyelitis status post right second toe amputation  Patient now with wound VAC x 2, managed initially by podiatry for the foot and for surgery for the groin    Tobacco use disorder  Assessment & Plan  Encourage cessation, continue nicotine patch    PAD (peripheral artery disease) ([facility])  Assessment & Plan  1/31 BYPASS AXILLO-FEMORAL, RIGHT WITH 8MM RINGED PTFE GRAFT   Continue aspirin, statin and Xarelto 2.5 mg twice daily    GERD without esophagitis  Assessment & Plan  Continue protonix    Anxiety and depression  Assessment & Plan  Continue Wellbutrin  mg daily  Supportive counseling    Essential hypertension  Assessment & Plan  Management per internal medicine however currently off of antihypertensives    Type 2 diabetes mellitus with diabetic polyneuropathy ([facility])  Assessment & Plan  Lab Results   Component Value Date    HGBA1C 6.5 (H) 11/07/2023       Recent Labs     02/22/24  1641 02/22/24  2048 02/23/24  0712 02/23/24  1118   POCGLU 105 151* 167* 175*       Had been on metformin as an outpatient but held while inpatient currently managed on a sliding scale insulin regimen  Monitor per internal medicine and make adjustments as needed            Subjective/Interval Events:     Review of Systems   Constitutional:  Negative for chills and fever.   HENT:  Negative for hearing loss and trouble swallowing.    Eyes:  Negative for photophobia and visual disturbance.   Respiratory:  Negative for cough and shortness of breath.    Cardiovascular:  Negative for chest pain and leg swelling.   Gastrointestinal:  Negative for constipation, diarrhea, nausea and vomiting.   Endocrine: Negative for cold intolerance and heat intolerance.   Genitourinary:  Negative for dysuria and hematuria.   Musculoskeletal:  Negative for back pain and neck pain.   Skin:  Negative for rash and wound.   Allergic/Immunologic: Negative for environmental allergies and food allergies.    Neurological:  Positive for weakness. Negative for dizziness, light-headedness and numbness.   Hematological:  Negative for adenopathy. Does not bruise/bleed easily.   Psychiatric/Behavioral:  Negative for dysphoric mood and sleep disturbance.          Function:  PRIOR LEVEL OF FUNCTION:  She lives in a(n) single family home  Lona Cedeno is  and lives with their family.  Self Care: Independent, Indoor Mobility: Independent, Stairs (in/outdoor): Independent, and Cognition: Independent     HOME ENVIRONMENT:  The living area: can live on one level  There are 5 steps to enter the home.    The patient will not have 24 hour supervision/physical assistance available upon discharge.    Current level of function:  Physical Therapy: Minimal assistance for transfers bed mobility, no ambulation attempted  Occupational Therapy: Min assist for upper body ADLs and max assist for lower body ADLs, supervision for eating and grooming  Speech Therapy: Not assessed    Physical Exam:  /54 (BP Location: Right arm)   Pulse 70   Temp 98.1 °F (36.7 °C) (Oral)   Resp 17   SpO2 95%      No intake or output data in the 24 hours ending 02/23/24 1601    There is no height or weight on file to calculate BMI.      Physical Exam  Vitals reviewed.   Constitutional:       General: She is not in acute distress.  HENT:      Head: Normocephalic and atraumatic.      Right Ear: External ear normal.      Left Ear: External ear normal.      Nose: Nose normal. No rhinorrhea.      Mouth/Throat:      Mouth: Mucous membranes are moist.      Pharynx: Oropharynx is clear.   Eyes:      General: No scleral icterus.  Cardiovascular:      Rate and Rhythm: Normal rate.   Pulmonary:      Effort: Pulmonary effort is normal. No respiratory distress.   Abdominal:      General: There is no distension.      Palpations: Abdomen is soft.   Musculoskeletal:      Cervical back: Normal range of motion.      Comments: 2 vacs in place 1 for the foot 1 for the  groin   Skin:     General: Skin is warm and dry.   Neurological:      General: No focal deficit present.      Mental Status: She is oriented to person, place, and time.      Comments: Generalized nonneurologic weakness in the bilateral lower extremities primarily in the proximal hip flexors   Psychiatric:         Mood and Affect: Mood normal.         Behavior: Behavior normal.          Labs, medications, and imaging personally reviewed.    Laboratory:    Lab Results   Component Value Date    SODIUM 137 02/23/2024    K 3.9 02/23/2024     02/23/2024    CO2 30 02/23/2024    BUN 17 02/23/2024    CREATININE 0.59 (L) 02/23/2024    GLUC 160 (H) 02/23/2024    CALCIUM 9.4 02/23/2024     Lab Results   Component Value Date    WBC 7.99 02/23/2024    HGB 10.9 (L) 02/23/2024    HCT 35.3 02/23/2024     (H) 02/23/2024     02/23/2024     Lab Results   Component Value Date    INR 1.16 02/14/2024    INR 1.03 02/04/2024    INR 1.11 02/03/2024    PROTIME 14.7 (H) 02/14/2024    PROTIME 13.4 02/04/2024    PROTIME 14.2 02/03/2024         Current Facility-Administered Medications:     acetaminophen (TYLENOL) tablet 975 mg, 975 mg, Oral, Q8H JILLIAN, ROXANE Gaona    albuterol (PROVENTIL HFA,VENTOLIN HFA) inhaler 2 puff, 2 puff, Inhalation, Q6H PRN, ROXANE Gaona    [START ON 2/24/2024] aspirin (ECOTRIN LOW STRENGTH) EC tablet 81 mg, 81 mg, Oral, Daily, ROXANE Gaona    atorvastatin (LIPITOR) tablet 10 mg, 10 mg, Oral, After Dinner, ROXANE Gaona    bisacodyl (DULCOLAX) EC tablet 5 mg, 5 mg, Oral, Daily PRN, ROXANE Gaona    [START ON 2/24/2024] buPROPion (WELLBUTRIN XL) 24 hr tablet 300 mg, 300 mg, Oral, Daily, ROXANE Gaona    cefpodoxime (VANTIN) tablet 400 mg, 400 mg, Oral, BID With Meals, ROXANE Gaona    clonazePAM (KlonoPIN) tablet 1 mg, 1 mg, Oral, QPM, ROXANE Gaona    [START ON 2/24/2024] cyanocobalamin  (VITAMIN B-12) tablet 1,000 mcg, 1,000 mcg, Oral, Daily, ROXANE Gaona    [START ON 2/24/2024] fish oil capsule 1,000 mg, 1,000 mg, Oral, Daily, ROXANE Gaona    [START ON 2/24/2024] fluticasone-vilanterol 200-25 mcg/actuation 1 puff, 1 puff, Inhalation, Daily, ROXANE Gaona    insulin lispro (HumaLOG) 100 units/mL subcutaneous injection 1-5 Units, 1-5 Units, Subcutaneous, TID AC **AND** Fingerstick Glucose (POCT), , , TID AC, ROXANE Gaona    insulin lispro (HumaLOG) 100 units/mL subcutaneous injection 1-5 Units, 1-5 Units, Subcutaneous, HS, ROXANE Gaona    methocarbamol (ROBAXIN) tablet 250 mg, 250 mg, Oral, Q8H JILLIAN, ROXANE Gaoan    [START ON 2/24/2024] multivitamin-minerals (CENTRUM) tablet 1 tablet, 1 tablet, Oral, Daily, ROXANE Gaona    [START ON 2/24/2024] nicotine (NICODERM CQ) 14 mg/24hr TD 24 hr patch 1 patch, 1 patch, Transdermal, Daily, ROXANE Gaona    ondansetron (ZOFRAN-ODT) dispersible tablet 4 mg, 4 mg, Oral, Q6H PRN, ROXANE Gaona    [START ON 2/24/2024] oxybutynin (DITROPAN-XL) 24 hr tablet 5 mg, 5 mg, Oral, Daily, ROXANE Gaona    oxyCODONE (ROXICODONE) split tablet 2.5 mg, 2.5 mg, Oral, Q4H PRN **OR** oxyCODONE (ROXICODONE) IR tablet 5 mg, 5 mg, Oral, Q4H PRN, ROXANE Gaona    [START ON 2/24/2024] pantoprazole (PROTONIX) EC tablet 40 mg, 40 mg, Oral, Early Morning, ROXANE Gaona    [START ON 2/24/2024] polyethylene glycol (MIRALAX) packet 17 g, 17 g, Oral, Daily, ROXANE Gaona    pregabalin (LYRICA) capsule 200 mg, 200 mg, Oral, TID, ROXANE Gaona    rivaroxaban (XARELTO) tablet 2.5 mg, 2.5 mg, Oral, BID With Meals, ROXANE Gaona    senna-docusate sodium (SENOKOT S) 8.6-50 mg per tablet 1 tablet, 1 tablet, Oral, BID, ROXANE Gaona  Allergies   Allergen Reactions     Paroxetine Other (See Comments)     Became suicidal    Adhesive [Medical Tape] Itching and Blisters    Carafate [Sucralfate] Other (See Comments)     Mouth sores, tongue sore    Sulfa Antibiotics Hives and Rash    Sulfamethoxazole-Trimethoprim Hives      Patient Active Problem List    Diagnosis Date Noted    Surgical site infection 02/13/2024    Sacral wound 02/23/2024    Impulsive 02/23/2024    Other dietary vitamin B12 deficiency anemia 02/16/2024    Leukocytosis 02/13/2024    Sepsis without acute organ dysfunction (HCC) 02/13/2024    At risk for venous thromboembolism (VTE) 02/09/2024    At high risk for skin breakdown 02/09/2024    Wound of skin 02/09/2024    Impaired mobility and activities of daily living 02/09/2024    Acute pain due to injury 02/09/2024    Hematoma 02/06/2024    Anemia 02/06/2024    Constipation 01/26/2024    Preoperative cardiovascular examination 01/25/2024    Diabetic foot ulcer with osteomyelitis ([facility]) 01/23/2024    Ankle ulcer, right, with fat layer exposed ([facility]) 12/14/2023    Age-related osteoporosis without current pathological fracture 11/28/2023    Abnormal CT of the chest 10/17/2023    COPD, severity to be determined ([facility]) 10/17/2023    Tobacco use disorder 10/17/2023    PAD (peripheral artery disease) ([facility]) 10/10/2023    Bladder neoplasm 09/11/2023    Left renal mass 09/11/2023    Lactic acidosis 08/11/2023    Hypomagnesemia 08/11/2023    Degenerative lumbar disc 05/24/2023    Urinary incontinence 04/13/2023    GERD without esophagitis 10/16/2019    Essential hypertension 02/15/2019    Anxiety and depression 02/15/2019    Type 2 diabetes mellitus with diabetic polyneuropathy ([facility]) 02/12/2019     Past Medical History:   Diagnosis Date    Anxiety     Closed fracture of coccyx ([facility]) 05/24/2023    Diabetes mellitus ([facility])     GERD (gastroesophageal reflux disease)     Hyperlipidemia     Hypertension     IBS (irritable bowel syndrome)     Shoulder fracture      Past Surgical History:    Procedure Laterality Date    ANKLE FRACTURE SURGERY Right     has screw in place     SECTION      x2    CHOLECYSTECTOMY      CYSTOSCOPY W/ LASER LITHOTRIPSY Left 2023    Procedure: CYSTOSCOPY; RETROGRADE; URETEROSCOPY; BIOPSY; LEFT -INSERTION OF STENT;  Surgeon: Cristian Child MD;  Location: [facility];  Service: Urology    CYSTOSCOPY W/ LASER LITHOTRIPSY Left 2023    Procedure: CYSTOSCOPY; RETROGRADE; URETEROSCOPY; 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;  Surgeon: Cristian Child MD;  Location: [facility];  Service: Urology    EVACUATION OF HEMATOMA Right 2024    Procedure: EVACUATION/ DRAINAGE CHEST WALL  HEMATOMA;  Surgeon: Seth Hoyos MD;  Location: [facility];  Service: Vascular    FL RETROGRADE PYELOGRAM  2023    HERNIA REPAIR      umbilicus w/ mesh    ME BYPASS W/VEIN AXILLARY-FEMORAL Right 2024    Procedure: BYPASS AXILLO-FEMORAL, RIGHT WITH 8MM RINGED PTFE GRAFT;  Surgeon: Seth Hoyos MD;  Location: [facility];  Service: Vascular    WOUND DEBRIDEMENT Right 2024    Procedure: RIGHT WOUND AND ACHILLES DEBRIDEMENT FOOT/TOE (WASH OUT); PARTIAL AMPUTATION DISTAL 2ND TOE;  Surgeon: Aaron Montero DPM;  Location: [facility];  Service: Podiatry    WOUND DEBRIDEMENT Right 2024    Procedure: DEBRIDEMENT RIGHT GROIN  WOUND AND WASHOUT, APPLICATION OF WOUND VAC;  Surgeon: Suly Muro DO;  Location: [facility];  Service: Vascular    WOUND DEBRIDEMENT Right 2024    Procedure: DEBRIDEMENT LOWER EXTREMITY, washout;  Surgeon: Suly Muro DO;  Location: [facility];  Service: Vascular     Social History     Socioeconomic History    Marital status: /Civil Union     Spouse name: Not on file    Number of children: Not on file    Years of education: Not on file    Highest education level: Not on file   Occupational History    Not on file   Tobacco Use    Smoking status: Every Day     Current packs/day: 1.00      Average packs/day: 1 pack/day for 63.1 years (63.1 ttl pk-yrs)     Types: Cigarettes     Start date: 1961    Smokeless tobacco: Never    Tobacco comments:     11/14/23 smokes about 3/4 PPD   Vaping Use    Vaping status: Never Used   Substance and Sexual Activity    Alcohol use: Not Currently    Drug use: Never    Sexual activity: Not Currently   Other Topics Concern    Not on file   Social History Narrative    Not on file     Social Determinants of Health     Financial Resource Strain: Low Risk  (2/9/2023)    Overall Financial Resource Strain (CARDIA)     Difficulty of Paying Living Expenses: Not hard at all   Food Insecurity: No Food Insecurity (1/24/2024)    Hunger Vital Sign     Worried About Running Out of Food in the Last Year: Never true     Ran Out of Food in the Last Year: Never true   Transportation Needs: No Transportation Needs (1/24/2024)    PRAPARE - Transportation     Lack of Transportation (Medical): No     Lack of Transportation (Non-Medical): No   Physical Activity: Not on file   Stress: Not on file   Social Connections: Not on file   Intimate Partner Violence: Not on file   Housing Stability: Low Risk  (1/24/2024)    Housing Stability Vital Sign     Unable to Pay for Housing in the Last Year: No     Number of Places Lived in the Last Year: 1     Unstable Housing in the Last Year: No     Social History     Tobacco Use   Smoking Status Every Day    Current packs/day: 1.00    Average packs/day: 1 pack/day for 63.1 years (63.1 ttl pk-yrs)    Types: Cigarettes    Start date: 1961   Smokeless Tobacco Never   Tobacco Comments    11/14/23 smokes about 3/4 PPD     Social History     Substance and Sexual Activity   Alcohol Use Not Currently     Family History   Problem Relation Age of Onset    COPD Mother     Emphysema Mother     COPD Father     Emphysema Father          Medical Necessity Criteria for [facility] Admission: Anemia, with the following plan: monitor H/H, Hypertension, Bowel/Bladder Management,  Diabetes requiring close blood glucose monitoring, Incision/Wound care, Post-operative Wound Infection, and Pressure sore/Skin Tear. In addition, the preadmission screen, post-admission physical evaluation, overall plan of care and admissions order demonstrate a reasonable expectation that the following criteria were met at the time of admission to the [facility].  The patient requires active and ongoing therapeutic intervention of multiple therapy disciplines (physical therapy, occupational therapy, speech-language pathology, or prosthetics/orthotics), one of which is physical or occupational therapy.    Patient requires an intensive rehabilitation therapy program, as defined in Chapter 1, section 110.2.2 of the CMS Medicare Policy Manual. This intensive rehabilitation therapy program will consist of at least 3 hours of therapy per day at least 5 days per week or at least 15 hours of intensive rehabilitation therapy within a 7 consecutive day period, beginning with the date of admission to the [facility].    The patient is reasonably expected to actively participate in, and benefit significantly from, the intensive rehabilitation therapy program as defined in Chapter 1, section 110.2.2 of the CMS Medicare Policy Manual at this time of admission to the [facility]. She can reasonably be expected to make measurable improvement (that will be of practical value to improve the patient’s functional capacity or adaptation to impairments) as a result of the rehabilitation treatment, as defined in section 110.3, and such improvement can be expected to be made within the prescribed period of time. As noted in the CMS Medicare Policy Manual, the patient need not be expected to achieve complete independence in the domain of self-care nor be expected to return to his or her prior level of functioning in order to meet this standard.  The patient must require physician supervision by a rehabilitation physician. As such, a rehabilitation physician will  conduct face-to-face visits with the patient at least 3 days per week throughout the patient’s stay in the [facility] to assess the patient both medically and functionally, as well as to modify the course of treatment as needed to maximize the patient’s capacity to benefit from the rehabilitation process.  The patient requires an intensive and coordinated interdisciplinary approach to providing rehabilitation, as defined in Chapter 1, section 110.2.5 of the CMS Medicare Policy Manual. This will be achieved through periodic team conferences, conducted at least once in a 7-day period, and comprising of an interdisciplinary team of medical professionals consisting of: a rehabilitation physician, registered nurse,  and/or , and a licensed/certified therapist from each therapy discipline involved in treating the patient.     Changes Since Pre-admission Assessment: None -This patient's participation in rehab continues to be reasonable, necessary and appropriate.    CMS Required Post-Admission Physician Evaluation Elements  History and Physical, including medical history, functional history and active comorbidities as in above text.     Post-Admission Physician Evaluation:  The patient has the potential to make improvement and is in need of physical, occupational, and/or therapy services. The patient may also need nutritional services. Given the patient's complex medical condition and risk of further medical complications, rehabilitative services cannot be safely provided at a lower level of care, such as a skilled nursing facility. I have reviewed the patient's functional and medical status at the time of the preadmission screening and they are the same as on the day of this admission. I acknowledge that I have personally performed a full physical examination on this patient within 24 hours of admission. The patient and/or family demonstrated understanding the rehabilitation program and the discharge  process after we discussed them.     Agree in entirety: yes  Minor adaptions: none    Major changes: none    Rigo Hoover,   Physical Medicine and Rehabilitation  [facility]    I have spent a total time of 75 minutes on 02/23/24 in caring for this patient including Risks and benefits of tx options, Instructions for management, Patient and family education, Importance of tx compliance, Impressions, Counseling / Coordination of care, Documenting in the medical record, Reviewing / ordering tests, medicine, procedures  , Obtaining or reviewing history  , and Communicating with other healthcare professionals .      ** Please Note: Fluency Direct voice to text software may have been used in the creation of this document. **

## 2024-02-23 NOTE — DISCHARGE SUMMARY
[facility]  Discharge- Lona Cedeno 1945, 78 y.o. female MRN: [id number]  [facility] En[facility]: [id number]  Primary Care Provider: Vivek Preston DO   Date and time admitted to hospital: 2/13/2024 11:30 PM    * Surgical site infection  Assessment & Plan  Continue cefepime, vancomycin DC'd 2/16/2024  MRSA screen negative  Right groin wound breakdown status post right groin washout, VAC placement on 2/14 and 2/19  Infectious disease following  Received IV cefepime while inpatient and transition to cefpodoxime 400 mg twice daily for additional 4 weeks  Cultures + klebsiella and proteus, anaerobic cultures negative growth thus far    Sepsis without acute organ dysfunction (HCC)  Assessment & Plan  Fever/leukocytosis at [facility] prior to arrival here, additionally with lactic acidosis resolved following IV fluids  Concern for surgical site infection at groin, s/p waschout with vac placement on 2/14 with vascular surgery   Received cefepime/vancomycin at [facility] prior to transfer to medical floor-currently on cefepime, vancomycin DC'd 2/16/2024  ID consult appreciated  Blood cultures NGTD  Covid/flu/rsv negative   Wound culture 2/14- (+) Klebsiella and Proteus growing  Anaerobic culture 2/14 no growth thus far  Urine culture-prelim> 100,000 enterobacter cloacae noted to be resistant to cefepime- likely secondary to colonization/asymptomatic bacteruria     Diabetic foot ulcer with osteomyelitis (HCC)  Assessment & Plan  Patient with right posterior ankle ulcer with exposed Achilles tendon s/p right foot debridement on 2/4/2024 with VAC, right medial ankle ulcer, right second toe osteomyelitis s/p right second toe amputation on 2//2024  Podiatry assistance appreciated  Wound VAC changes per podiatry    Other dietary vitamin B12 deficiency anemia  Assessment & Plan  B12 level 93  pending MMA   S/p b12 injection 2/17- give additional 2 doses to complete 3 doses total   Started  PO supplements as well     Constipation  Assessment & Plan  Continue current bowel regimen  Resolved    Tobacco use disorder  Assessment & Plan  Continue with nicotine patch  Encourage cessation    COPD, severity to be determined ([facility])  Assessment & Plan  Respiratory status is stable  Continue maintenance inhalers and PRN bronchodilators    PAD (peripheral artery disease) ([facility])  Assessment & Plan  1/31 BYPASS AXILLO-FEMORAL, RIGHT WITH 8MM RINGED PTFE GRAFT   Continue aspirin, statin, xarelto 2.5 mg bid     Essential hypertension  Assessment & Plan  Hypotension noted post op- now resolved   BP stable  Continue holding amiloride    Type 2 diabetes mellitus with diabetic polyneuropathy ([facility])  Assessment & Plan  Lab Results   Component Value Date    HGBA1C 6.5 (H) 11/07/2023       Recent Labs     02/22/24  1641 02/22/24  2048 02/23/24  0712 02/23/24  1118   POCGLU 105 151* 167* 175*         Blood Sugar Average: Last 72 hrs:  (P) [id number]  hold metformin while inpatient  Diabetic diet  Fingersticks QID with sliding scale coverage        Medical Problems       Resolved Problems  Date Reviewed: 2/23/2024   None       Discharging Physician / Practitioner: Clara Gann DO  PCP: Vivek Preston DO  Admission Date:   Admission Orders (From admission, onward)       Ordered        02/13/24 2333  Inpatient Admission  Once            02/13/24 2334  Inpatient Admission  Once                          Discharge Date: 02/23/24    Consultations During Hospital Stay:  Podiatry  PMR  ID  Vascular surgery    Procedures Performed:   Right groin wound breakdown status post right groin washout, VAC placement on 2/14 and 2/19     Significant Findings / Test Results:   None     Incidental Findings:   None      Test Results Pending at Discharge (will require follow up):   None      Outpatient Tests Requested:  None     Complications:  see above    Reason for Admission: fever     Hospital Course:   Lona Cedeno is a 78  "y.o. female patient who originally presented to the hospital on 2/13/2024 due to fever in the setting of known bypass axillofemoral graft.  Workup revealed concern for right groin wound sloughing at the incision site.  Patient also has history of diabetic foot ulcer/OM with exposed Achilles tendon s/p VAC placement during prior hospitalization.  Currently attempting limb salvage with podiatry managing wound VAC.  Patient underwent VAC placement with surgical team on 2/14 and 2/19 for the right groin wound.  Wound cultures from the OR grew Klebsiella and Proteus patient was placed on IV cefepime and urine cultures growing Enterobacter which is thought to be a contaminant.  ID following and patient remained stable for discharge with additional 28 days of cefpodoxime.  Patient will continue VAC management with podiatry and vascular surgery once at rehab.  Patient was started on B12 supplementation given low levels.  Home antihypertensives held given postop hypotension however repeat blood pressures have been stable off any antihypertensives.    Please see above list of diagnoses and related plan for additional information.     Condition at Discharge: stable    Discharge Day Visit / Exam:   Subjective: Patient assessed at bedside.  No complaints.  Vitals: Blood Pressure: 123/76 (02/23/24 0710)  Pulse: 67 (02/23/24 0710)  Temperature: (!) 96.6 °F (35.9 °C) (02/23/24 0710)  Temp Source: Oral (02/22/24 0710)  Respirations: 16 (02/23/24 0710)  Height: 5' 1\" (154.9 cm) (02/13/24 2349)  Weight - Scale: 68.9 kg (151 lb 14.4 oz) (02/13/24 2349)  SpO2: 95 % (02/23/24 0710)  Exam:   Physical Exam  Vitals reviewed.   Constitutional:       General: She is not in acute distress.     Appearance: Normal appearance. She is not ill-appearing.   HENT:      Head: Normocephalic and atraumatic.   Cardiovascular:      Rate and Rhythm: Normal rate and regular rhythm.      Heart sounds: Normal heart sounds.   Pulmonary:      Effort: Pulmonary " effort is normal. No respiratory distress.      Breath sounds: No wheezing or rales.   Abdominal:      General: Bowel sounds are normal.      Tenderness: There is no abdominal tenderness.   Musculoskeletal:      Right lower leg: No edema.      Left lower leg: No edema.   Skin:     General: Skin is warm and dry.   Neurological:      Mental Status: She is alert and oriented to person, place, and time. Mental status is at baseline.   Psychiatric:         Mood and Affect: Mood normal.         Behavior: Behavior normal.        Discussion with Family:  patient.     Discharge instructions/Information to patient and family:   See after visit summary for information provided to patient and family.      Provisions for Follow-Up Care:  See after visit summary for information related to follow-up care and any pertinent home health orders.      Mobility at time of Discharge:   Basic Mobility Inpatient Raw Score: 12  JH-HLM Goal: 4: Move to chair/commode  JH-HLM Achieved: 5: Stand (1 or more minutes)  HLM Goal achieved. Continue to encourage appropriate mobility.     Disposition:   Acute Rehab at [facility]    Planned Readmission: none      Discharge Statement:  I spent 35 minutes discharging the patient. This time was spent on the day of discharge. I had direct contact with the patient on the day of discharge. Greater than 50% of the total time was spent examining patient, answering all patient questions, arranging and discussing plan of care with patient as well as directly providing post-discharge instructions.  Additional time then spent on discharge activities.    Discharge Medications:  See after visit summary for reconciled discharge medications provided to patient and/or family.      **Please Note: This note may have been constructed using a voice recognition system**

## 2024-02-23 NOTE — ASSESSMENT & PLAN NOTE
He will most recently at 10.9 has been improving over the last week  Continue to monitor with biweekly labs or sooner if clinically indicated  Continue B12

## 2024-02-23 NOTE — QUICK NOTE
Acute Care Surgery  Bedside V.A.C. Procedure Note    Location of wound: right groin    Dressings and Foam removed:  0  Dressings  2 Black Foam  0 White Foam    Dimensions of wound: 4 cm x 1 cm x 1 cm    Description of wound: On inspection of the wound today, the wound appeared clean with well formed granulation tissue. There was no necrotic or nonviable tissue requiring debridement. Approximately 100% of the wound base is now pink and healthy and there is granulation tissue. The periwound skin remains clean and intact and unremarkable.    VAC dressing application:  The periwound skin was cleaned and dried. 2 black foam, 0 white foam were cut to size of the wound and placed into the wound. The dressings were then covered with VAC drape. Additional VAC drape and black foam was used to create a bridge to the patient's right lower abdomen and a base for the track pad. The track pad was then placed over the base of black foam. The VAC was then set to 125 mmHg low Continuous suction. The patient tolerated the procedure well and there were no complications. The patient did not require any excisional debridement during today's dressing change.    VAC settings:  125 mmHg  Continuous    Wound Images:      Additional Notes:  The VAC sticker placed over the dressing per protocol.  The next VAC dressing change will be planned for 2/26/24.  The VAC paperwork was completed and give to the case management staff to arrange home VAC therapy.    Himanshu Overton MD  2/23/2024 7:18 AM

## 2024-02-23 NOTE — ASSESSMENT & PLAN NOTE
S/p right axillary to femoral BPG 1/31/24  Right axillary I&D/hematoma evacuation 2/4/24  Debridement right groin/washout/VAC dressing 2/14/24 for infection  Repeat right groin washout 2/19/24 for infection  Continue ASA, Xarelto and atorvastatin.  Encourage tobacco cessation.  Price check on Xarelto = $20.00/mo

## 2024-02-23 NOTE — ASSESSMENT & PLAN NOTE
Right groin surgical wound infection  Wound cx were positive for Klebsiella and Proteus  S/p OR 2/14 and 2/19 for debride and washout  Currently has VAC dressing on the right groin  Needs long term antibiotic 2/2 proximity of the wound infection to the bypass graft  For Cefpodoxime 400 mg q12hrs x 28 days starting 2/23/24

## 2024-02-23 NOTE — ASSESSMENT & PLAN NOTE
Patient with right posterior ankle ulcer with exposed Achilles tendon s/p right foot debridement on 2/4/2024 with VAC, right medial ankle ulcer, right second toe osteomyelitis s/p right second toe amputation on 2//2024  Podiatry assistance appreciated  Wound VAC changes per podiatry

## 2024-02-23 NOTE — ASSESSMENT & PLAN NOTE
- Rehabilitation medicine physician for daily monitoring of care, 24 hour availability for acute  medical issues, medication management, and therapeutic and diagnostic assessments.  - 24 hour rehabilitation nursing 7 days per week for: management/teaching of medications,  bowel/bladder routine, skin care.  - PT, OT for 2-3 hours per day, 5 to 6 days per week; 15 hours per week  - Rehabilitation Psychology for adjustment and coping  - MSW for barriers to discharge, community resources, and family support  - Discharge planning following to help ensure a safe and efficient discharge

## 2024-02-23 NOTE — ASSESSMENT & PLAN NOTE
Lab Results   Component Value Date    HGBA1C 6.5 (H) 11/07/2023       Recent Labs     02/25/24  2043 02/26/24  0630 02/26/24  1101 02/26/24  1553   POCGLU 129 141* 203* 99       Had been on metformin as an outpatient but held while inpatient currently managed on a sliding scale insulin regimen  Monitor per internal medicine and make adjustments as needed  (P) [id number]

## 2024-02-23 NOTE — ASSESSMENT & PLAN NOTE
s/p right foot wound/achilles debridement with foot/toe washout; partial amputation distal right 2nd toe; wound VAC 2/4/24   Podiatry following  VAC changes per Podiatry

## 2024-02-23 NOTE — PLAN OF CARE
Problem: PHYSICAL THERAPY ADULT  Goal: Performs mobility at highest level of function for planned discharge setting.  See evaluation for individualized goals.  Description: Treatment/Interventions: Functional transfer training, Therapeutic exercise, Bed mobility, Gait training, Spoke to nursing, OT  Equipment Recommended: Wheelchair       See flowsheet documentation for full assessment, interventions and recommendations.  Outcome: Progressing  Note: Prognosis: Good  Problem List: Decreased strength, Decreased endurance, Decreased mobility, Pain  Assessment: Pt seen for PT treatment session this date. Therapy session focused on bed mobility, functional transfers,HEP in order to improve overall mobility and independence. Pt motivated to participate with therapy and states wants to work towards getting home soon. Pt able to complete multiple functional transfers during session with improved quality and decreased assistance level. Pt with increased activity tolerance, but is limited 2* R groin/wound vac location pain + fatigue. Tolerates HEP as detailed in above flow sheet with VC to complete.  Pt making steady progress toward goals. Pt was left in recliner at the end of PT session with all needs in reach. Pt would benefit from continued PT services while in hospital to address remaining limitations. The patient's AM-PAC Basic Mobility Inpatient Short Form Raw Score is 12. A Raw score of less than or equal to 16 suggests the patient may benefit from discharge to post-acute rehabilitation services. Please also refer to the recommendation of the Physical Therapist for safe discharge planning. Post dc recommendation is Level I at this time.  Barriers to Discharge: Decreased caregiver support, Inaccessible home environment     Rehab Resource Intensity Level, PT: I (Maximum Resource Intensity)    See flowsheet documentation for full assessment.

## 2024-02-23 NOTE — H&P
[facility]  H&P  Name: Lona Cedeno 78 y.o. female I MRN: [id number]  Unit/Bed#: -01 I Date of Admission: 2/23/2024   Date of Service: 2/23/2024 I Hospital Day: 0      Assessment/Plan   * Surgical site infection  Assessment & Plan  Right groin surgical wound infection  Wound cx were positive for Klebsiella and Proteus  S/p OR 2/14 and 2/19 for debride and washout  Currently has VAC dressing on the right groin  Needs long term antibiotic 2/2 proximity of the wound infection to the bypass graft  For Cefpodoxime 400 mg q12hrs x 28 days starting 2/23/24    Diabetic foot ulcer with osteomyelitis (HCC)  Assessment & Plan  s/p right foot wound/achilles debridement with foot/toe washout; partial amputation distal right 2nd toe; wound VAC 2/4/24   Podiatry following  VAC changes per Podiatry    PAD (peripheral artery disease) (HCC)  Assessment & Plan  S/p right axillary to femoral BPG 1/31/24  Right axillary I&D/hematoma evacuation 2/4/24  Debridement right groin/washout/VAC dressing 2/14/24 for infection  Repeat right groin washout 2/19/24 for infection  Continue ASA, Xarelto and atorvastatin.  Encourage tobacco cessation.  Price check on Xarelto = $20.00/mo     Hematoma  Assessment & Plan  S/p I&D hematoma/seroma of right axillary dissection operative site 2/4/24   stable    Anxiety and depression  Assessment & Plan  Continue home medications Klonopin and Wellbutrin     Essential hypertension  Assessment & Plan  Home: Amiloride 5mg qd/Lisinopril 5mg qd  Here: no meds  Will watch    Type 2 diabetes mellitus with diabetic polyneuropathy (HCC)  Assessment & Plan  Continue DM diet; Accuchecks/SSI  Home: Januvia 100mg qd/Metformin 1000mg qd  Here: coverage only  Continue QID Accuchecks/SSI and DM diet  No changes today but will consider restarting Metformin over the weekend      Sacral wound  Assessment & Plan  POA to [facility] to see  Management by PMR    Other dietary vitamin B12  deficiency anemia  Assessment & Plan  Vitamin B12 level was 93  Got 3 doses of IM B12 and is currently on PO  MMA pending    Anemia  Assessment & Plan  Stable  Will watch    Tobacco use disorder  Assessment & Plan  Continue nicotine patch    COPD, severity to be determined (HCC)  Assessment & Plan  Continue Breo and albuterol.  Pt uses Advair 250/50 and albuterol at home.  Respiratory status stable         Subjective/HPI:    Lona Cedeno is a 78 year old patient with a history of COPD, DM type 2, HTN, tobacco abuse, anxiety/depression, PAD and bladder neoplasm who presented 1/23/24 to [facility] with a diabetic ulcer of the toe and right ankle wound with exposure of the Achilles tendon. During her stay, she had debridement of the wound on 1/9/24. Work-up during that time revealed significant aortoiliac disease and inadequate healing potential. She was initially started on abx which were stopped after the pt was seen by ID as she did not have an active infection. She was transferred to [facility] for revascularization. She underwent a right axillo-femoral bypass on 1/31/24. On 2/4/24 she went to the OR for Achilles debridement foot/toe, partial amputation distal 2nd toe, VAC placement and evacuation/drainage of right chest wall hematoma with Podiatry and Vascular surgery. The chest hematoma/seroma did re accumulate and further surgery was not thought to be helpful and therefore did not require any further surgical intervention. She was discharged to the [facility] on 2/9/24 with a wound VAC and a plan to follow-up with wound care in the outpt setting for possible split thickness skin graft.     On 2/13/24, while in [facility], she developed a fever with transient lactic acidosis.  She had urine and blood cultures sent, started on IV fluid and Cefepime.  Later in the day, she became confused and her fever spike again prompting return back to acute care.  She was placed on Vancomycin in addition to Cefepime.  Fever/sepsis was  felt to be from right groin wound infection.  She went to the OR on 2/14/24 for debridement/wash-out and VAC dressing placement.  There was no evidence during the debridement of arterial prosthetic graft infection.  Wound cultures were positive for Klebsiella and Proteus.  ID saw in consult.  They stopped the vancomycin.  Urine culture was positive for Enterobacter but felt to be colonization.  On 2/19, she went back to the OR for repeat washout.  She was eventually transitioned to PO antibiotic with Cefpodoxime 400 mg q12hrs and is to stay on this for 4 weeks.     Patient is now in [facility] for inpatient acute rehabilitation and we are asked to assist with medical management.    Currently, the patient does not have any complaints of CP, SOB, dizziness, N/V/D.      Review of Systems: A 10 point ROS was performed; negative except as noted above.       Social History:    Substance Use History:   Social History     Substance and Sexual Activity   Alcohol Use Not Currently     Social History     Tobacco Use   Smoking Status Every Day    Current packs/day: 1.00    Average packs/day: 1 pack/day for 63.1 years (63.1 ttl pk-yrs)    Types: Cigarettes    Start date: 1961   Smokeless Tobacco Never   Tobacco Comments    11/14/23 smokes about 3/4 PPD     Social History     Substance and Sexual Activity   Drug Use Never       Family History:    Family History   Problem Relation Age of Onset    COPD Mother     Emphysema Mother     COPD Father     Emphysema Father          Review of Scheduled Meds:  Current Facility-Administered Medications   Medication Dose Route Frequency Provider Last Rate    acetaminophen  975 mg Oral Q8H [facility] ROXANE Gaona      albuterol  2 puff Inhalation Q6H PRN ROXANE Gaona      [START ON 2/24/2024] aspirin  81 mg Oral Daily ROXANE Gaona      atorvastatin  10 mg Oral After Dinner ROXANE Gaona      bisacodyl  5 mg Oral Daily PRN ROXANE Gaona       [START ON 2/24/2024] buPROPion  300 mg Oral Daily ROXANE Gaona      cefpodoxime  400 mg Oral BID With Meals ROXANE Gaona      clonazePAM  1 mg Oral QPM ROXANE Gaona      [START ON 2/24/2024] vitamin B-12  1,000 mcg Oral Daily ROXANE Gaona      [START ON 2/24/2024] fish oil  1,000 mg Oral Daily ROXANE Gaona      [START ON 2/24/2024] fluticasone-vilanterol  1 puff Inhalation Daily ROXANE Gaona      insulin lispro  1-5 Units Subcutaneous TID AC ROXANE Gaona      insulin lispro  1-5 Units Subcutaneous HS RXOANE Gaona      methocarbamol  250 mg Oral Q8H [facility] ROXANE Gaona      [START ON 2/24/2024] multivitamin-minerals  1 tablet Oral Daily ROXANE Gaona      [START ON 2/24/2024] nicotine  1 patch Transdermal Daily ROXANE Gaona      ondansetron  4 mg Oral Q6H PRN ROXANE Gaona      [START ON 2/24/2024] oxybutynin  5 mg Oral Daily ROXANE Gaona      oxyCODONE  2.5 mg Oral Q4H PRN ROXANE Gaona      Or    oxyCODONE  5 mg Oral Q4H PRN ROXANE Gaona      [START ON 2/24/2024] pantoprazole  40 mg Oral Early Morning ROXANE Gaona      [START ON 2/24/2024] polyethylene glycol  17 g Oral Daily ROXANE Gaona      pregabalin  200 mg Oral TID ROXANE Gaona      rivaroxaban  2.5 mg Oral BID With Meals ROXANE Gaona      senna-docusate sodium  1 tablet Oral BID ROXANE Gaona         Physical Exam:  Temp:  [96.6 °F (35.9 °C)-98.1 °F (36.7 °C)] 98.1 °F (36.7 °C)  HR:  [67-70] 70  Resp:  [16-17] 17  BP: (116-125)/(54-76) 116/54  SpO2:  [95 %-96 %] 95 %    General Appearance: no distress, nontoxic appearing  HEENT:  External ear normal.  Nose normal w/o drainage. Mucous membranes are moist. Oropharynx is clear. Conjunctiva clear w/o icterus or  "redness.  Neck:  Supple, normal ROM  Lungs: BBS without crackles/wheeze/rhonchi; respirations unlabored with normal inspiratory/expiratory effort.  No retractions noted.  On RA  CV: regular rate and rhythm; no rubs/murmurs/gallops, PMI normal   ABD: Abdomen is soft.  Bowel sounds all quadrants.  Nontender with no distention.    EXT: no edema  Skin: normal turgor, normal texture, no rashes  Psych: affect normal, mood normal  Neuro: AAO       Laboratory:    Results from last 7 days   Lab Units 02/23/24  0553 02/21/24  0507 02/19/24  0603   HEMOGLOBIN g/dL 10.9* 11.0* 10.9*   HEMATOCRIT % 35.3 34.4* 35.3   WBC Thousand/uL 7.99 9.65 8.55     Results from last 7 days   Lab Units 02/23/24  0553 02/21/24  0507 02/19/24  0603   BUN mg/dL 17 10 9   SODIUM mmol/L 137 142 143   POTASSIUM mmol/L 3.9 4.1 4.0   CHLORIDE mmol/L 100 103 104   CREATININE mg/dL 0.59* 0.55* 0.53*            Wt Readings from Last 1 Encounters:   02/13/24 68.9 kg (151 lb 14.4 oz)     Estimated body mass index is 28.7 kg/m² as calculated from the following:    Height as of 2/13/24: 5' 1\" (1.549 m).    Weight as of 2/13/24: 68.9 kg (151 lb 14.4 oz).    Imaging:  No orders to display       Level 3 - DNAR and DNI    Counseling / Coordination of Care:   Total floor / unit time spent today 65 minutes. and Greater than 50% of total time was spent with the patient and / or family counseling and / or coordination of care.  A description of the counseling / coordination of care: review of inpatient records, examination of the patient and explanation of the plan of care to the patient.      ** Please Note: Fluency Direct voice to text software may have been used in the creation of this document. **        "

## 2024-02-23 NOTE — ASSESSMENT & PLAN NOTE
1/31 BYPASS AXILLO-FEMORAL, RIGHT WITH 8MM RINGED PTFE GRAFT   Continue aspirin, statin and Xarelto 2.5 mg twice daily

## 2024-02-23 NOTE — PROGRESS NOTES
Progress Note - Infectious Disease   Lona Cedeno 78 y.o. female MRN: [id number]  Unit/Bed#: [facility] 815-01 Encounter: [id number]      Impression:  1.  Postoperative right groin wound infection with secondary sepsis and with presumptive involvement of recent axillofemoral graft s/p debridement, washout and VAC placement POD 8,3  2.  Type II DM with PAD s/p remote right ankle fracture with ORIF  and right axillary to femoral bypass 2024 with Achilles debridement and partial second toe amputation with hematoma evacuation  3.  COPD  4.  GERD    Recommendations:  Patient is afebrile with normal WBC count of 7,990 and hemoglobin of 10.9., picture by  surgery  noted.  Wound appears clean with granulation tissue.  Discussed therapy with primary service who will write orders.    1.  So far wound cultures are showing 1+ Klebsiella pneumoniae  and Proteus vulgaris group both susceptible to third-generation cephalosporin.  MRSA nares culture is negative  2.  As discussed agree with switch to cefpodoxime 400 mg every 12 hours p.o. for approximately 28-day additional course  3.  Patient to be transferred to the [facility] today.      Antibiotics:  1.  Cefpodoxime 400 mg every 12 hours p.o. day 1 of 28 Rx, day 10 total antibiotic Rx    Subjective:  No further constipation, denies pain  Denies fevers, chills, or sweats.  Denies nausea, vomiting, or diarrhea.      Objective:  Vitals:  Temp:  [96.6 °F (35.9 °C)-98.9 °F (37.2 °C)] 96.6 °F (35.9 °C)  HR:  [67-72] 67  Resp:  [16] 16  BP: (120-125)/(65-76) 123/76  SpO2:  [95 %-97 %] 95 %  Temp (24hrs), Av.9 °F (36.6 °C), Min:96.6 °F (35.9 °C), Max:98.9 °F (37.2 °C)  Current: Temperature: (!) 96.6 °F (35.9 °C)    Physical Exam:     General Appearance:  Eyes: Alert, responsive nontoxic, no acute distress.  Conjunctiva pale   Throat: Oropharynx moist without lesions.  Lips, mucosa, and tongue normal   Neck: Supple, symmetrical, trachea midline, no adenopathy,  no  tenderness/mass/nodules   Lungs:   Clear to auscultation bilaterally, no audible wheezes, rhonchi or rales; respirations unlabored   Heart:  Regular rate and rhythm, S1, S2 normal, no murmur, rub or gallop   Abdomen:   Soft, non-tender, non-distended, positive bowel sounds.  No masses, no organomegaly    No CVA tenderness   Extremities: Right groin wound with VAC in place. Surgery picture of wound 2/23 noted.  Wound is clean with some granulation tissue.  Right foot and ankle with dry surgical dressing s/p distal second toe amputation   Skin: Skin color pale, as above.         Invasive Devices       Peripheral Intravenous Line  Duration             Peripheral IV 02/21/24 Dorsal (posterior);Left Forearm 2 days              Drain  Duration             Ureteral Internal Stent Left ureter 6 Fr. 95 days                    Labs, Imaging, & Other studies:   All pertinent labs were personally reviewed  Results from last 7 days   Lab Units 02/23/24  0553 02/21/24  0507 02/19/24  0603   WBC Thousand/uL 7.99 9.65 8.55   HEMOGLOBIN g/dL 10.9* 11.0* 10.9*   PLATELETS Thousands/uL 315 343 372     Results from last 7 days   Lab Units 02/23/24  0553 02/21/24  0507 02/19/24  0603   SODIUM mmol/L 137 142 143   POTASSIUM mmol/L 3.9 4.1 4.0   CHLORIDE mmol/L 100 103 104   CO2 mmol/L 30 32 32   BUN mg/dL 17 10 9   CREATININE mg/dL 0.59* 0.55* 0.53*   EGFR ml/min/1.73sq m 88 90 91   CALCIUM mg/dL 9.4 9.7 9.8

## 2024-02-23 NOTE — ASSESSMENT & PLAN NOTE
Continue DM diet; Accuchecks/SSI  Home: Januvia 100mg qd/Metformin 1000mg qd  Here: coverage only  Continue QID Accuchecks/SSI and DM diet  No changes today but will consider restarting Metformin over the weekend

## 2024-02-23 NOTE — PLAN OF CARE
Problem: OCCUPATIONAL THERAPY ADULT  Goal: Performs self-care activities at highest level of function for planned discharge setting.  See evaluation for individualized goals.  Description: Treatment Interventions: ADL retraining, Functional transfer training, Endurance training, Cognitive reorientation, Patient/family training, Compensatory technique education, Continued evaluation, Energy conservation, Activityengagement          See flowsheet documentation for full assessment, interventions and recommendations.   Note: Limitation: Decreased ADL status, Decreased Safe judgement during ADL, Decreased cognition, Decreased endurance, Decreased self-care trans, Decreased high-level ADLs  Prognosis: Good  Assessment: Patient participated in Skilled OT session this date with interventions consisting of ADL re training with the use of correct body mechnaics, Energy Conservation techniques, safety awareness and fall prevention techniques,  therapeutic activities to: increase activity tolerance, and increase OOB/ sitting tolerance . Patient agreeable to OT treatment session, upon arrival patient was found seated OOB to Chair, supine in bed, alert, responsive , and in no apparent distress.  In comparison to previous session, patient with improvements in ADL completion, functional mobility. Patient requiring verbal cues for safety, frequent rest periods, and ocassional safety reminders. Pt pleasant and cooperative, motivated to participate. Pt completed functional bed mobility with min Ax1. Min Ax2 for functional STS txfs with b/l HHA. Min Ax2 for functional SPT to chair with drop arm. Pt requiring VCs to recall WBS. Pt required SUP for grooming to wash hands/face. Min A for UB bathing and LB bathing (pt completed simulated task 2/2 being recently washed up). Pt required min A for UB dressing to don/doff gown. Max A for LB dressing to don/doff L sock. Patient continues to be functioning below baseline level, occupational  performance remains limited secondary to factors listed above and increased risk for falls and injury. The patient's raw score on the AM-PAC Daily Activity Inpatient Short Form is 17. A raw score of less than 19 suggests the patient may benefit from discharge to post-acute rehabilitation services. Please refer to the recommendation of the Occupational Therapist for safe discharge planning. From OT standpoint, recommendation at time of d/c would be Level 1 resources.  Patient to benefit from continued Occupational Therapy treatment while in the hospital to address deficits as defined above and maximize level of functional independence with ADLs and functional mobility.     Rehab Resource Intensity Level, OT: I (Maximum Resource Intensity)

## 2024-02-23 NOTE — ASSESSMENT & PLAN NOTE
Patient with right posterior ankle ulcer with exposed Achilles tendon s/p right foot debridement on 2/4/2024 with VAC, right medial ankle ulcer, right second toe osteomyelitis status post right second toe amputation  Patient now with wound VAC x 2, managed initially by podiatry for the foot and for surgery for the groin  Podiatry would like to take the patient back to acute care for an OR split thickness grafting on 2/28 with plan discharged on 2/27.  On review of previous notes that this was something considered but timeframe was not specifically noted.

## 2024-02-24 LAB
GLUCOSE SERPL-MCNC: 119 MG/DL (ref 65–140)
GLUCOSE SERPL-MCNC: 124 MG/DL (ref 65–140)
GLUCOSE SERPL-MCNC: 125 MG/DL (ref 65–140)
GLUCOSE SERPL-MCNC: 170 MG/DL (ref 65–140)

## 2024-02-24 PROCEDURE — 99233 SBSQ HOSP IP/OBS HIGH 50: CPT | Performed by: PHYSICAL MEDICINE & REHABILITATION

## 2024-02-24 PROCEDURE — 97163 PT EVAL HIGH COMPLEX 45 MIN: CPT

## 2024-02-24 PROCEDURE — 97166 OT EVAL MOD COMPLEX 45 MIN: CPT

## 2024-02-24 PROCEDURE — 97530 THERAPEUTIC ACTIVITIES: CPT

## 2024-02-24 PROCEDURE — 97535 SELF CARE MNGMENT TRAINING: CPT

## 2024-02-24 PROCEDURE — 97110 THERAPEUTIC EXERCISES: CPT

## 2024-02-24 PROCEDURE — 82948 REAGENT STRIP/BLOOD GLUCOSE: CPT

## 2024-02-24 PROCEDURE — 99232 SBSQ HOSP IP/OBS MODERATE 35: CPT | Performed by: INTERNAL MEDICINE

## 2024-02-24 RX ADMIN — METHOCARBAMOL 250 MG: 500 TABLET ORAL at 14:28

## 2024-02-24 RX ADMIN — SENNOSIDES, DOCUSATE SODIUM 1 TABLET: 8.6; 5 TABLET ORAL at 08:26

## 2024-02-24 RX ADMIN — OMEGA-3 FATTY ACIDS CAP 1000 MG 1000 MG: 1000 CAP at 08:26

## 2024-02-24 RX ADMIN — RIVAROXABAN 2.5 MG: 2.5 TABLET, FILM COATED ORAL at 08:26

## 2024-02-24 RX ADMIN — ACETAMINOPHEN 975 MG: 325 TABLET, FILM COATED ORAL at 05:47

## 2024-02-24 RX ADMIN — METHOCARBAMOL 250 MG: 500 TABLET ORAL at 21:08

## 2024-02-24 RX ADMIN — PREGABALIN 200 MG: 100 CAPSULE ORAL at 08:26

## 2024-02-24 RX ADMIN — ATORVASTATIN CALCIUM 10 MG: 10 TABLET, FILM COATED ORAL at 17:07

## 2024-02-24 RX ADMIN — ASPIRIN 81 MG: 81 TABLET, COATED ORAL at 08:26

## 2024-02-24 RX ADMIN — POLYETHYLENE GLYCOL 3350 17 G: 17 POWDER, FOR SOLUTION ORAL at 08:26

## 2024-02-24 RX ADMIN — INSULIN LISPRO 1 UNITS: 100 INJECTION, SOLUTION INTRAVENOUS; SUBCUTANEOUS at 08:31

## 2024-02-24 RX ADMIN — Medication 1 TABLET: at 08:26

## 2024-02-24 RX ADMIN — CLONAZEPAM 1 MG: 1 TABLET ORAL at 21:08

## 2024-02-24 RX ADMIN — FLUTICASONE FUROATE AND VILANTEROL TRIFENATATE 1 PUFF: 200; 25 POWDER RESPIRATORY (INHALATION) at 09:57

## 2024-02-24 RX ADMIN — SENNOSIDES, DOCUSATE SODIUM 1 TABLET: 8.6; 5 TABLET ORAL at 17:07

## 2024-02-24 RX ADMIN — CYANOCOBALAMIN TAB 500 MCG 1000 MCG: 500 TAB at 08:26

## 2024-02-24 RX ADMIN — CEFPODOXIME PROXETIL 400 MG: 200 TABLET, FILM COATED ORAL at 17:06

## 2024-02-24 RX ADMIN — METHOCARBAMOL 250 MG: 500 TABLET ORAL at 05:47

## 2024-02-24 RX ADMIN — Medication 2.5 MG: at 17:08

## 2024-02-24 RX ADMIN — PANTOPRAZOLE SODIUM 40 MG: 40 TABLET, DELAYED RELEASE ORAL at 05:47

## 2024-02-24 RX ADMIN — ACETAMINOPHEN 975 MG: 325 TABLET, FILM COATED ORAL at 21:08

## 2024-02-24 RX ADMIN — CEFPODOXIME PROXETIL 400 MG: 200 TABLET, FILM COATED ORAL at 08:26

## 2024-02-24 RX ADMIN — BUPROPION HYDROCHLORIDE 300 MG: 150 TABLET, EXTENDED RELEASE ORAL at 08:31

## 2024-02-24 RX ADMIN — PREGABALIN 200 MG: 100 CAPSULE ORAL at 21:08

## 2024-02-24 RX ADMIN — RIVAROXABAN 2.5 MG: 2.5 TABLET, FILM COATED ORAL at 17:06

## 2024-02-24 RX ADMIN — PREGABALIN 200 MG: 100 CAPSULE ORAL at 17:07

## 2024-02-24 RX ADMIN — ACETAMINOPHEN 975 MG: 325 TABLET, FILM COATED ORAL at 14:28

## 2024-02-24 RX ADMIN — OXYBUTYNIN CHLORIDE 5 MG: 5 TABLET, EXTENDED RELEASE ORAL at 08:26

## 2024-02-24 RX ADMIN — NICOTINE 1 PATCH: 14 PATCH, EXTENDED RELEASE TRANSDERMAL at 08:26

## 2024-02-24 NOTE — PROGRESS NOTES
Physical Medicine and Rehabilitation Progress Note  Lona Cedeno 78 y.o. female MRN: [id number]  Unit/Bed#: -01 Encounter: [id number]    Chief Complaint:  She accidentally tugged on her groin wound vac.    Interval History: No major events overnight. While working with therapy, she accidentally tugged on her groin vac, and the edge started to peel. The wound vac still is maintaining suction, and nursing is reinforcing. Pain is adequately controlled. She denies any new CP, SOB, fevers, chills, N/V, abdominal pain. Last BM 2/22.    Assessment/Plan - Continue plan of care as per primary attending, unless otherwise stated below:      Diabetic foot ulcer with osteomyelitis s/p distal R 2nd toe amputation and wound vac: Continue current plan of care..  R groin surgical wound infection s/p VAC: Monitor. Change as scheduled or as needed.   Impaired mobility/ADLs: PT/OT 3-5 hours/day, 5-7 days/week.  Anxiety/Depression: Continue home regimen  Impulsivity: Bed and chair alarms, room close to nursing station. Regular checks throughout the day.  Unstageable sacral pressure injury POA: Local care, frequent offloading, specialty cushion.   Vitamin B12 deficiency: Continue supplementation  Acute pain: Continue current regimen  Pain: Continue current regimen  Bowel/bladder continent: Continue current regimen  DVT Ppx: Xarelto for PAD    Total visit time: 35 minutes, with more than 50% spent counseling/coordinating care. Counseling includes discussion with patient re: progress in therapies, functional issues observed by therapy staff, and discussion with patient regarding their current medical state and wellbeing. Coordination of patient's care was performed in conjunction with Internal Medicine service to monitor patient's labs, vitals, and management of their comorbidities.    Ashley de Padua, MD  Physical Medicine and Rehabilitation      Review of Systems: A 10 point review of systems was negative except for what is noted  in the HPI.        Current Facility-Administered Medications:     acetaminophen (TYLENOL) tablet 975 mg, 975 mg, Oral, Q8H JILLIAN, ROXANE Gaona, 975 mg at 02/24/24 0547    albuterol (PROVENTIL HFA,VENTOLIN HFA) inhaler 2 puff, 2 puff, Inhalation, Q6H PRN, ROXANE Gaona    aspirin (ECOTRIN LOW STRENGTH) EC tablet 81 mg, 81 mg, Oral, Daily, ROXANE Gaona, 81 mg at 02/24/24 0826    atorvastatin (LIPITOR) tablet 10 mg, 10 mg, Oral, After Dinner, ROXANE Gaona, 10 mg at 02/23/24 1716    bisacodyl (DULCOLAX) EC tablet 5 mg, 5 mg, Oral, Daily PRN, ROXANE Gaona    buPROPion (WELLBUTRIN XL) 24 hr tablet 300 mg, 300 mg, Oral, Daily, ROXANE Gaona, 300 mg at 02/24/24 0831    cefpodoxime (VANTIN) tablet 400 mg, 400 mg, Oral, BID With Meals, ROXANE Gaona, 400 mg at 02/24/24 0826    clonazePAM (KlonoPIN) tablet 1 mg, 1 mg, Oral, QPM, ROXANE Gaona, 1 mg at 02/23/24 2112    cyanocobalamin (VITAMIN B-12) tablet 1,000 mcg, 1,000 mcg, Oral, Daily, ROXANE Gaona, 1,000 mcg at 02/24/24 0826    fish oil capsule 1,000 mg, 1,000 mg, Oral, Daily, ROXANE Gaona, 1,000 mg at 02/24/24 0826    fluticasone-vilanterol 200-25 mcg/actuation 1 puff, 1 puff, Inhalation, Daily, ROXANE Gaona, 1 puff at 02/24/24 0957    insulin lispro (HumaLOG) 100 units/mL subcutaneous injection 1-5 Units, 1-5 Units, Subcutaneous, TID AC, 1 Units at 02/24/24 0831 **AND** Fingerstick Glucose (POCT), , , TID AC, ROXANE Gaona    insulin lispro (HumaLOG) 100 units/mL subcutaneous injection 1-5 Units, 1-5 Units, Subcutaneous, HS, ROXANE Gaona    methocarbamol (ROBAXIN) tablet 250 mg, 250 mg, Oral, Q8H JILLIAN, ROXANE Gaona, 250 mg at 02/24/24 0547    multivitamin-minerals (CENTRUM) tablet 1 tablet, 1 tablet, Oral, Daily, ROXANE Gaona, 1 tablet at 02/24/24 0826     nicotine (NICODERM CQ) 14 mg/24hr TD 24 hr patch 1 patch, 1 patch, Transdermal, Daily, ROXANE Gaona, 1 patch at 02/24/24 0826    ondansetron (ZOFRAN-ODT) dispersible tablet 4 mg, 4 mg, Oral, Q6H PRN, ROXANE Gaona    oxybutynin (DITROPAN-XL) 24 hr tablet 5 mg, 5 mg, Oral, Daily, ROXANE Gaona, 5 mg at 02/24/24 0826    oxyCODONE (ROXICODONE) split tablet 2.5 mg, 2.5 mg, Oral, Q4H PRN **OR** oxyCODONE (ROXICODONE) IR tablet 5 mg, 5 mg, Oral, Q4H PRN, ROXANE Gaona    pantoprazole (PROTONIX) EC tablet 40 mg, 40 mg, Oral, Early Morning, ROXANE Gaona, 40 mg at 02/24/24 0547    polyethylene glycol (MIRALAX) packet 17 g, 17 g, Oral, Daily, ROXANE Gaona, 17 g at 02/24/24 0826    pregabalin (LYRICA) capsule 200 mg, 200 mg, Oral, TID, ROXANE Gaona, 200 mg at 02/24/24 0826    rivaroxaban (XARELTO) tablet 2.5 mg, 2.5 mg, Oral, BID With Meals, ROXANE Gaona, 2.5 mg at 02/24/24 0826    senna-docusate sodium (SENOKOT S) 8.6-50 mg per tablet 1 tablet, 1 tablet, Oral, BID, ROXANE Gaona, 1 tablet at 02/24/24 0826    Physical Exam:  Temp:  [97.6 °F (36.4 °C)-98.1 °F (36.7 °C)] 97.9 °F (36.6 °C)  HR:  [70-89] 74  Resp:  [17-18] 18  BP: (110-124)/(54-59) 124/59  SpO2:  [92 %-95 %] 94 %    Gen: No acute distress  HEENT: Moist mucus membranes, Normocephalic/Atraumatic  Cardiovascular: Regular rate, rhythm, S1/S2. Distal pulses palpable  Heme/Extr: No edema  Pulmonary: Non-labored breathing. Lungs CTAB  GI: Soft, non-tender, non-distended.  Integumentary: Skin is warm, dry. Wound vacs appear intact at this time.  Neuro: AAOx3,  Speech is intact. Appropriate to questioning. She is a bit impulsive.  Psych: Normal mood and affect.     Laboratory:  Labs reviewed  Results from last 7 days   Lab Units 02/23/24  0553 02/21/24  0507 02/19/24  0603   HEMOGLOBIN g/dL 10.9* 11.0* 10.9*   HEMATOCRIT % 35.3 34.4* 35.3   WBC  Thousand/uL 7.99 9.65 8.55     Results from last 7 days   Lab Units 02/23/24  0553 02/21/24  0507 02/19/24  0603   BUN mg/dL 17 10 9   SODIUM mmol/L 137 142 143   POTASSIUM mmol/L 3.9 4.1 4.0   CHLORIDE mmol/L 100 103 104   CREATININE mg/dL 0.59* 0.55* 0.53*            Diagnostic Studies: Reviewed   No orders to display         ** Please Note: Fluency Direct voice to text software may have been used in the creation of this document. **

## 2024-02-24 NOTE — PROGRESS NOTES
[facility]  Progress Note  Name: Lona Cedeno I  MRN: [id number]  Unit/Bed#: -01 I Date of Admission: 2/23/2024   Date of Service: 2/24/2024 I Hospital Day: 1    Assessment/Plan   * Surgical site infection  Assessment & Plan  Right groin surgical wound infection  Wound cx were positive for Klebsiella and Proteus  S/p OR 2/14 and 2/19 for debride and washout  Currently has VAC dressing on the right groin  Needs long term antibiotic 2/2 proximity of the wound infection to the bypass graft  Continue Cefpodoxime 400 mg q12hrs x 28 days starting 2/23/24    Diabetic foot ulcer with osteomyelitis (HCC)  Assessment & Plan  s/p right foot wound/achilles debridement with foot/toe washout; partial amputation distal right 2nd toe; wound VAC 2/4/24   Podiatry following  VAC changes per Podiatry    PAD (peripheral artery disease) (HCC)  Assessment & Plan  S/p right axillary to femoral BPG 1/31/24  Right axillary I&D/hematoma evacuation 2/4/24  Debridement right groin/washout/VAC dressing 2/14/24 for infection  Repeat right groin washout 2/19/24 for infection  Continue ASA, Xarelto and atorvastatin.  Encourage tobacco cessation.  Price check on Xarelto = $20.00/mo   VAC dressing per GS    Hematoma  Assessment & Plan  S/p I&D hematoma/seroma of right axillary dissection operative site 2/4/24   stable    Anxiety and depression  Assessment & Plan  Continue home medications Klonopin and Wellbutrin     Essential hypertension  Assessment & Plan  Home: Amiloride 5mg qd/Lisinopril 5mg qd  Here: no meds  Will watch    Type 2 diabetes mellitus with diabetic polyneuropathy (HCC)  Assessment & Plan  Continue DM diet; Accuchecks/SSI  Home: Januvia 100mg qd/Metformin 1000mg qd  Here: coverage only  Continue QID Accuchecks/SSI and DM diet  Will restart Metformin 500 mg BID in AM 2/25/24    Sacral wound  Assessment & Plan  POA to [facility] to see  Management by PMR    Other dietary vitamin B12  deficiency anemia  Assessment & Plan  Vitamin B12 level was 93  Got 3 doses of IM B12 and is currently on PO  MMA pending    Anemia  Assessment & Plan  Stable  Will watch    Tobacco use disorder  Assessment & Plan  Continue nicotine patch    COPD, severity to be determined (HCC)  Assessment & Plan  Continue Breo and albuterol.  Pt uses Advair 250/50 and albuterol at home.  Respiratory status stable         The above assessment and plan was reviewed and updated as determined by my evaluation of the patient on 2/24/2024.    Labs:   Results from last 7 days   Lab Units 02/23/24  0553 02/21/24  0507   WBC Thousand/uL 7.99 9.65   HEMOGLOBIN g/dL 10.9* 11.0*   HEMATOCRIT % 35.3 34.4*   PLATELETS Thousands/uL 315 343     Results from last 7 days   Lab Units 02/23/24  0553 02/21/24  0507   SODIUM mmol/L 137 142   POTASSIUM mmol/L 3.9 4.1   CHLORIDE mmol/L 100 103   CO2 mmol/L 30 32   BUN mg/dL 17 10   CREATININE mg/dL 0.59* 0.55*   CALCIUM mg/dL 9.4 9.7             Results from last 7 days   Lab Units 02/24/24  1545 02/24/24  1033 02/24/24  0616   POC GLUCOSE mg/dl 125 124 170*       Imaging  No orders to display       Review of Scheduled Meds:  Current Facility-Administered Medications   Medication Dose Route Frequency Provider Last Rate    acetaminophen  975 mg Oral Q8H [facility] Melany Bautista, ROXANE      albuterol  2 puff Inhalation Q6H PRN ROXANE Gaona      aspirin  81 mg Oral Daily ROXANE Gaona      atorvastatin  10 mg Oral After Dinner Melany Bautista, ROXANE      bisacodyl  5 mg Oral Daily PRN ROXANE Gaona      buPROPion  300 mg Oral Daily ROXANE Gaona      cefpodoxime  400 mg Oral BID With Meals ROXANE Gaona      clonazePAM  1 mg Oral QPM ROXANE Gaona      vitamin B-12  1,000 mcg Oral Daily Melany Bautista, ROXANE      fish oil  1,000 mg Oral Daily ROXANE Gaona      fluticasone-vilanterol  1 puff  Inhalation Daily ROXANE Gaona      insulin lispro  1-5 Units Subcutaneous TID AC ROXANE Gaona      insulin lispro  1-5 Units Subcutaneous HS ROXANE Gaona      [START ON 2/25/2024] metFORMIN  500 mg Oral BID With Meals Melany Bautista, ROXANE      methocarbamol  250 mg Oral Q8H JILLIAN Melany Bautista, ROXANE      multivitamin-minerals  1 tablet Oral Daily Melany Bautista, ROXANE      nicotine  1 patch Transdermal Daily Melany Bautista, ROXANE      ondansetron  4 mg Oral Q6H PRN Melany Bautitsa, ROXANE      oxybutynin  5 mg Oral Daily Melany Bautista, ROXANE      oxyCODONE  2.5 mg Oral Q4H PRN Melany Bautista, ROXANE      Or    oxyCODONE  5 mg Oral Q4H PRN Melany Bautista, ROXANE      pantoprazole  40 mg Oral Early Morning Melany Bautista, ROXANE      polyethylene glycol  17 g Oral Daily Melany Bautista, ROXANE      pregabalin  200 mg Oral TID Melany Bautista, ROXANE      rivaroxaban  2.5 mg Oral BID With Meals Melany Bautista, ROXANE      senna-docusate sodium  1 tablet Oral BID Melany Bautista, ROXANE         Subjective/ HPI: Patient seen and examined. Patients overnight issues or events were reviewed with nursing staff. New or overnight issues include the following:     No new or overnight issues.  Offers no complaints    ROS:   A 10 point ROS was performed; negative except as noted above.        *Labs /Radiology studies Reviewed  *Medications  reviewed and reconciled as needed  *Please refer to order section for additional ordered labs studies      Physical Examination:  Vitals:   Vitals:    02/23/24 1400 02/23/24 2019 02/24/24 0557 02/24/24 1410   BP: 116/54 110/59 124/59 129/69   BP Location: Right arm Right arm Left arm Left arm   Pulse: 70 89 74 82   Resp: 17 17 18 14   Temp: 98.1 °F (36.7 °C) 97.6 °F (36.4 °C) 97.9 °F (36.6 °C) 98 °F (36.7 °C)   TempSrc: Oral Oral Oral Oral   SpO2: 95% 92% 94% 92%       General  Appearance: no distress, non toxic appearing  HEENT: PERRLA, conjuctiva normal; oropharynx clear; mucous membranes moist   Neck:  Supple, normal ROM  Lungs: CTA, normal respiratory effort, no retractions, expiratory effort normal  CV: regular rate and rhythm; no rubs/murmurs/gallops, PMI normal   ABD: soft; ND/NT; +BS  EXT: no edema  Skin: normal turgor, normal texture, no rashes  Psych: affect normal, mood normal  Neuro: AAO           The above physical exam was reviewed and updated as determined by my evaluation of the patient on 2/24/2024.    Invasive Devices       Peripheral Intravenous Line  Duration             Peripheral IV 02/21/24 Dorsal (posterior);Left Forearm 3 days              Drain  Duration             Ureteral Internal Stent Left ureter 6 Fr. 96 days                       VTE Pharmacologic Prophylaxis: Xarelto  Code Status: Level 3 - DNAR and DNI  Current Length of Stay: 1 day(s)    Total floor / unit time spent today 30 minutes  Coordination of patient's care was performed in conjunction with primary service. Time invested included review of patient's labs, vitals, and management of their comorbidities with continued monitoring, examination of patient as well as answering patient questions, documenting her findings and creating progress note in electronic medical record,  ordering appropriate diagnostic testing.       ** Please Note:  voice to text software may have been used in the creation of this document. Although proof errors in transcription or interpretation are a potential of such software**

## 2024-02-24 NOTE — PLAN OF CARE
Problem: INFECTION - ADULT  Goal: Absence or prevention of progression during hospitalization  Description: INTERVENTIONS:  - Assess and monitor for signs and symptoms of infection  - Monitor lab/diagnostic results  - Monitor all insertion sites, i.e. indwelling lines, tubes, and drains  - Monitor endotracheal if appropriate and nasal secretions for changes in amount and color  - [place] appropriate cooling/warming therapies per order  - Administer medications as ordered  - Instruct and encourage patient and family to use good hand hygiene technique  - Identify and instruct in appropriate isolation precautions for identified infection/condition  Outcome: Progressing

## 2024-02-24 NOTE — PROGRESS NOTES
PT ARC EVALUATION       02/24/24 1200   Patient Data   Rehab Impairment Impairment of mobility, safety and Activities of Daily Living (ADLs) due to Other Disabling Impairments: 13 Other Disabling Impairments   Etiologic Diagnosis Diabetic right foot ulcer with osteomyelitis s/p fem bypass, right foot wound and achilles debridement with 2nd toe partial amputation and VAC placement   Date of Onset 02/26/24   Support System   Name Carmelo   Relationship spouse   Able to provide 24 hour supervision Yes   Able to provide physical help? No  (has Parkinson's)   Multiple Support Systems Yes   Support System 2   Name 2 Ariela   Relationship 2 daugther   Able to provide 24 hour supervision 2 No  (Works full time)   Able to provide physical help? (2) Yes   Support System 3   Name 3 Balbina   Relationship 3 daughter   Able to provide 24 hour supervision 3 Yes   Able to provide physical help? (3) No   Home Setup   Type of Home Single Level   Method of Entry Stairs   Number of Stairs   (5 concrete steps w/ BHR + 5 wooden steps w/ BHR. Reports back entrance that has 3 PATO. 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 Independent - Patient completed the activities by him/herself, with or without an assistive  device, with no assistance from a helper.   Indoor-Mobility (Ambulation) 3. Independent - Patient completed the activities by him/herself, with or without an assistive device, with no assistance from a helper.   Stairs 3. Independent - Patient completed the activities by him/herself, with or without an assistive device, with no assistance from a helper.   Functional Cognition 3. Independent - Patient completed the activities by him/herself, with or without an assistive device, with no assistance from a helper.   Prior Assistance Needed for Household Chores/Cleaning   Prior Device Used Z. None of the above   Falls in the Last Year   Number of falls in the past 12 months 1   Type of Injury Associated with Fall Injury  (pelvis fracture July 2023)   Restrictions/Precautions   Precautions Bed/chair alarms;Cognitive;Fall Risk;Hard of hearing;Impulsive;Contact/isolation;Multiple lines;Pressure Ulcer;Supervision on toilet/commode  (woudn vac (2))   Weight Bearing Restrictions Yes   RLE Weight Bearing Per Order (S)  NWB   Braces or Orthoses Other (Comment)  (prevalon boot on RLE)   Pain Assessment   Pain Assessment Tool 0-10   Pain Score No Pain   Transfer Bed/Chair/Wheelchair   Type of Assistance Needed Physical assistance   Physical Assistance Level Total assistance   Comment Morris x2, with second person assisting for wound vac management, min-mod VCs to maintain NWB on RLE when initiating transfer. Trialed SPT transfer with RW and assist of second person, but pt unable to hop on LLE   Chair/Bed-to-Chair Transfer CARE Score 1   Roll Left and Right   Type of Assistance Needed Supervision   Roll Left and Right CARE Score 4   Sit to Lying   Type of Assistance Needed Supervision   Comment HOB flat, no handrails   Sit to Lying CARE Score 4   Lying to Sitting on Side of Bed   Type of Assistance Needed Supervision   Comment HOB flat, no handrails   Lying to Sitting on Side of Bed CARE Score 4   Sit to Stand   Type of Assistance  Needed Physical assistance   Physical Assistance Level 26%-50%   Comment Morris with RW   Sit to Stand CARE Score 3   Picking Up Object   Reason if not Attempted Safety concerns   Picking Up Object CARE Score 88   Car Transfer   Type of Assistance Needed Physical assistance   Physical Assistance Level Total assistance   Comment MinAx2 with second person assisting with management of wound vacs, sit pivot transfer WC<>Car. Patient reports having SUV at home. Discussed with patient having daughter bring in personal car to practice as sit pivot will likely not work for SUV. Atttempted SPT with RW, but unable to maintain NWB on RLE   Car Transfer CARE Score 1   Ambulation   Findings Unable to hop when trialing SPT with RW, therefore, unable to assess ambulation. Not recommending ambulation at DC due to high energy expenditure and increased risk for falls unless neccessary to access a specific area in her home. Will need to assess pictures of home when received from daughter.   Walk 10 Feet   Reason if not Attempted Safety concerns   Walk 10 Feet CARE Score 88   Walk 50 Feet with Two Turns   Reason if not Attempted Safety concerns   Walk 50 Feet with Two Turns CARE Score 88   Walk 150 Feet   Reason if not Attempted Safety concerns   Walk 150 Feet CARE Score 88   Walking 10 Feet on Uneven Surfaces   Reason if not Attempted Safety concerns   Walking 10 Feet on Uneven Surfaces CARE Score 88   Wheelchair mobility   Type of Wheelchair Used 1. Manual   Method Right upper extremity;Left upper extremity   Assistance Provided For Locking Brakes;Remove Leg Rest;Replace Leg Rest;Remove armrests;Replace armrests;Obstacles   Findings Further assess patient's ability to management WC parts next session   Wheel 50 Feet with Two Turns   Type of Assistance Needed Physical assistance   Physical Assistance Level 25% or less   Comment Morris and VCs with obstacles navigation and turns   Wheel 50 Feet with Two Turns CARE Score 3   Wheel 150 Feet    Type of Assistance Needed Physical assistance   Physical Assistance Level 25% or less   Comment Morris and VCs with obstacles navigation and turns   Wheel 150 Feet CARE Score 3   Curb or Single Stair   Reason if not Attempted Safety concerns   1 Step (Curb) CARE Score 88   4 Steps   Reason if not Attempted Safety concerns   4 Steps CARE Score 88   12 Steps   Reason if not Attempted Safety concerns   12 Steps CARE Score 88   Stairs   Findings Discussed ramp installation at home, but patient does not think that is a viable option. Will need to assess stair situation once PT receives pictures from the daughter.   Comprehension   QI: Comprehension 4. Undestands: Clear comprehension without cues or repetitions   Expression   QI: Expression 4. Express complex messages without difficulty and with speech that is clear and easy to understan   Strength RLE   R Hip Flexion 3+/5   R Knee Flexion 3/5   R Knee Extension 3/5   LLE Assessment   LLE Assessment   (grossly 4/5)   Cognition   Overall Cognitive Status Impaired   Arousal/Participation Alert;Responsive;Cooperative   Attention Attends with cues to redirect   Orientation Level Oriented X4   Memory Decreased recall of precautions   Following Commands Follows one step commands with increased time or repetition   Therapeutic Exercise   Therapeutic Exercise/Activity Modified bridge maintaining NWB on RLE 3x10, SAQ 3x10 each LE   Discharge Information   Vocational Plan Retired/not working   Patient's Discharge Plan DC home mod I   Patient's Rehab Expectations To return home. Move around better.   Barriers to Discharge Home Limited Family Support;Unsafe Home Setup;Decreased Strength;Decreased Endurance;Safety Considerations   Impressions Patient is a 78 year old female who presents to [facility] following hospitalization for  surgical site infection. Previously on the [facility] from 2/10-2/13 as pt initially present to hospital from her podiatry office with a diabetic ulcer of the right  toe and right ankle with exposed Achilles tendon. She was found to have osteomyelitis of the right.  PMH significant for history of anxiety, closed fracture of the coccyx back in May 2023, diabetes, GERD, hyperlipidemia, hypertension, IBS . Prior to admission, patient was independent for ADLs, + , ambulating without assistive device. Patient lives with with spouse, and daughter in a 1SH. On initial evaluation, patient presents decreased strength, imbalance, sensation deficits, decreased endurance, fatigue, decreased safety awareness, decreased insight to deficits, weight bearing restrictions, and delayed righting reactions resulting in increased assistance for all functional mobility. Patient requires Supervision for bed mobility, Min Assistance x2 for transfers, Morris for WC mobility, and non ambulatory 2/2 to inability to maintain NWB on RLE.  Patient is high risk for falls secondary to deficits found on initial evaluation. Patient will benefit from physical therapy services to address the deficits identified on evaluation and to maximize safety and independence with all functional mobility and to decrease caregiver burden. Barriers to discharge home at this time include: limited caregiver support/ inaccessible home environment/high risk for falls/impaired cognition. Patient is a good rehabilitation candidate. Patient's estimated length of stay is 2 weeks with goals set for Modified [place]. PT plan of care to focus on bed mobility/transfers/gait training/stair navigation/strength training/balance training/improving activity tolerance.   PT Therapy Minutes   PT Time In 1200   PT Time Out 1330   PT Total Time (minutes) 90   PT Mode of treatment - Individual (minutes) 90   PT Mode of treatment - Concurrent (minutes) 0   PT Mode of treatment - Group (minutes) 0   PT Mode of treatment - Co-treat (minutes) 0   PT Mode of Treatment - Total time(minutes) 90 minutes   PT Cumulative Minutes 90   Cumulative  Minutes   Cumulative therapy minutes 180     Elenita Miller, PT, DPT

## 2024-02-24 NOTE — PROGRESS NOTES
PT ARC GOALS        02/24/24 1200   Rehab Team Goals   Transfer Team Goal Patient will be independent with transfers with least restrictive device upon completion of rehab program   Locomotion Team Goal Patient will be independent with locomotion with least restrictive device upon completion of rehab program   Rehab Team Interventions   PT Interventions Therapeutic Exercise;Gait Training;Neuromuscualr Reeducation;Transfer Training;Bed Mobility;Wheelchair Mobility;Patient/Family Education;Group Therapy   PT Transfer Goal   Roll left and right Goal 06. Independent - Patient completes the activity by him/herself with no assistance from a helper.   Sit to lying Goal 06. Independent - Patient completes the activity by him/herself with no assistance from a helper.   Lying to sitting on side of bed Goal 06. Independent - Patient completes the activity by him/herself with no assistance from a helper.   Sit to stand Goal 06. Independent - Patient completes the activity by him/herself with no assistance from a helper.   Chair/bed-to-chair transfer Goal 06. Independent - Patient completes the activity by him/herself with no assistance from a helper.   Car Transfer Goal 04. Supervision or touching assistance- [place] provides VERBAL CUES or supervision throughout activity.   Safety Precautions NWB  (RLE)   Environment Level Surface;Well Lit   Status Target goal - two weeks   Locomotion Goal   Primary discharge mode of locomotion Both   Target Walk Distance 10 ft   Assist Device Roller Walker   Environment Level Surface   Walk 10 feet Goal 04. TOUCHING/ STEADYING assistance as patient completes activity.   Walk 50 feet with 2 turns Goal 09. Not applicable   Walk 150 feet Goal 09. Not applicable   Walking 10 feet on uneven surface 09. Not applicable   Type of Wheelchair Used 1. Manual   Target Wheel Distance- Level 150 ft   Wheel 50 feet with 2 turns Goal 06. Independent - Patient completes the activity by him/herself with no  assistance from a helper.   Wheel 150 feet Goal 06. Independent - Patient completes the activity by him/herself with no assistance from a helper.   Decrease Assist With Locking Brakes;Obstacles;Remove Leg Rest;Press Release;Replace Leg Rest   Environment Level Surface;Well Lit   Wheelchair Goal Status Target goal - two weeks   Stairs Goal   1 step or curb goal 03. Partial/moderate assistance - Shiro does less than half the effort. Shiro lifts or holds trunk or limbs and provides more than half the effort.  (Morris)   4 steps Goal 03. Partial/moderate assistance - Shiro does less than half the effort. Shiro lifts or holds trunk or limbs and provides more than half the effort.  (Morris)   12 steps Goal 09. Not applicable   Number of Stairs 5  (+5)   Hand Rail Bilateral   Status Target goal - two weeks   Object Retrieval Goal   Picking up object Goal 04. TOUCHING/ STEADYING assistance as patient completes activity.   Assistive Device  Reacher   Small Object Picked Up marker

## 2024-02-24 NOTE — ASSESSMENT & PLAN NOTE
Right groin surgical wound infection  Wound cx were positive for Klebsiella and Proteus  S/p OR 2/14 and 2/19 for debride and washout  Currently has VAC dressing on the right groin  Needs long term antibiotic 2/2 proximity of the wound infection to the bypass graft  Continue Cefpodoxime 400 mg q12hrs x 28 days starting 2/23/24

## 2024-02-24 NOTE — ASSESSMENT & PLAN NOTE
Continue DM diet; Accuchecks/SSI  Home: Januvia 100mg qd/Metformin 1000mg qd  Here: coverage only  Continue QID Accuchecks/SSI and DM diet  Will restart Metformin 500 mg BID in AM 2/25/24

## 2024-02-24 NOTE — PLAN OF CARE
Problem: Prexisting or High Potential for Compromised Skin Integrity  Goal: Skin integrity is maintained or improved  Description: INTERVENTIONS:  - Identify patients at risk for skin breakdown  - Assess and monitor skin integrity  - Assess and monitor nutrition and hydration status  - Monitor labs   - Assess for incontinence   - Turn and reposition patient  - Assist with mobility/ambulation  - Relieve pressure over bony prominences  - Avoid friction and shearing  - Provide appropriate hygiene as needed including keeping skin clean and dry  - Evaluate need for skin moisturizer/barrier cream  - Collaborate with interdisciplinary team   - Patient/family teaching  - Consider wound care consult   Outcome: Progressing     Problem: MOBILITY - ADULT  Goal: Maintain or return to baseline ADL function  Description: INTERVENTIONS:  -  Assess patient's ability to carry out ADLs; assess patient's baseline for ADL function and identify physical deficits which impact ability to perform ADLs (bathing, care of mouth/teeth, toileting, grooming, dressing, etc.)  - Assess/evaluate cause of self-care deficits   - Assess range of motion  - Assess patient's mobility; develop plan if impaired  - Assess patient's need for assistive devices and provide as appropriate  - Encourage maximum independence but intervene and supervise when necessary  - Involve family in performance of ADLs  - Assess for home care needs following discharge   - Consider OT consult to assist with ADL evaluation and planning for discharge  - Provide patient education as appropriate  Outcome: Progressing  Goal: Maintains/Returns to pre admission functional level  Description: INTERVENTIONS:  - Perform AM-PAC 6 Click Basic Mobility/ Daily Activity assessment daily.  - Set and communicate daily mobility goal to care team and patient/family/caregiver.   - Collaborate with rehabilitation services on mobility goals if consulted  - Perform Range of Motion  times a day.  -  Reposition patient every  hours.  - Dangle patient  times a day  - Stand patient  times a day  - Ambulate patient  times a day  - Out of bed to chair  times a day   - Out of bed for meals  times a day  - Out of bed for toileting  - Record patient progress and toleration of activity level   Outcome: Progressing     Problem: INFECTION - ADULT  Goal: Absence or prevention of progression during hospitalization  Description: INTERVENTIONS:  - Assess and monitor for signs and symptoms of infection  - Monitor lab/diagnostic results  - Monitor all insertion sites, i.e. indwelling lines, tubes, and drains  - Monitor endotracheal if appropriate and nasal secretions for changes in amount and color  - [place] appropriate cooling/warming therapies per order  - Administer medications as ordered  - Instruct and encourage patient and family to use good hand hygiene technique  - Identify and instruct in appropriate isolation precautions for identified infection/condition  Outcome: Progressing  Goal: Absence of fever/infection during neutropenic period  Description: INTERVENTIONS:  - Monitor WBC    Outcome: Progressing

## 2024-02-24 NOTE — ASSESSMENT & PLAN NOTE
S/p right axillary to femoral BPG 1/31/24  Right axillary I&D/hematoma evacuation 2/4/24  Debridement right groin/washout/VAC dressing 2/14/24 for infection  Repeat right groin washout 2/19/24 for infection  Continue ASA, Xarelto and atorvastatin.  Encourage tobacco cessation.  Price check on Xarelto = $20.00/mo   VAC dressing per GS

## 2024-02-24 NOTE — PROGRESS NOTES
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 No  (has parkinsons)   Multiple Support Systems Yes  (one other daughter Kathy, lives in [place])   Support System 2   Name 2 parish   Relationship 2 daughter   Able to provide 24 hour supervision 2 No  (lives in trailer on property with ; works FT)   Able to provide physical help? (2) Yes   Support System 3   Name 3 darrin   Relationship 3 daughter   Able to provide 24 hour supervision 3 Yes   Able to provide physical help? (3) No  (assist with IADLs; intellectual disability)   Home Setup   Type of Home Single Level   Method of Entry Stairs   Number of Stairs 4  (3+1 (front), 5-6 + 5 (side) , 3 (back))   Number of Stairs in Home 0  (FF to basement which only daughter uses)   First Floor Bathroom Full;Shower;Grab Bars;Built-in shower seat   First Floor Bathroom Accessibility Grab bars in tub/shower;Built in shower seat   Second Floor Bathroom None   First Floor Setup Available Yes   Home Modification Comment pending progress, anticipate need for ramp   Available Equipment Single Point Cane;Rollator;Handheld Shower;Bedside Commode;Long Handled Adaptive Equipment   Baseline Information   Vocation   (retired in 2011)   Transportation   (only  in the family)   Prior Device(s) Used Rollator;Single Point Cane  (SPC for stairs, rollator for shopping)   Prior IADL Participation   Money Management Identify Money;Estimate Costs;Estimate Change;Combine Bills;Manage  Checkbook  (right now parish until pt comes home)   Meal Preparation Full Participation   Laundry   (daughter completes)   Home Cleaning   (daughter completes)   Prior Level of Function   Self-Care 3. Independent - Patient completed the activities by him/herself, with or without an assistive device, with no assistance from a helper.   Indoor-Mobility (Ambulation) 3. Independent - Patient completed the activities by him/herself, with or without an assistive device, with no assistance from a helper.   Stairs 3. Independent - Patient completed the activities by him/herself, with or without an assistive device, with no assistance from a helper.   Functional Cognition 3. Independent - Patient completed the activities by him/herself, with or without an assistive device, with no assistance from a helper.   Prior Assistance Needed for Household Chores/Cleaning  (Ind with pill box at home)   Prior Device Used Z. None of the above   Falls in the Last Year   Number of falls in the past 12 months 1   Type of Injury Associated with Fall Injury  (broke pelvis on back porch swing in July 2023)   Patient Preference   Nickname (Patient Preference) Lona Hastings   Psychosocial   Psychosocial (WDL) WDL   Restrictions/Precautions   Precautions Bed/chair alarms;Cognitive;Fall Risk;Hard of hearing;Impulsive;Contact/isolation;Multiple lines;Pressure Ulcer;Supervision on toilet/commode  (2 wound vacs)   Weight Bearing Restrictions Yes   RLE Weight Bearing Per Order (S)  NWB   Braces or Orthoses Other (Comment)  (R prevalon)   Pain Assessment   Pain Assessment Tool 0-10   Pain Score No Pain   Eating Assessment   Type of Assistance Needed Independent   Physical Assistance Level No physical assistance   Eating CARE Score 6   Oral Hygiene   Type of Assistance Needed Physical assistance   Physical Assistance Level Total assistance   Comment would req Ax2 in stance at sink to maintain RLE NWBing; completed with supv seated today   Oral Hygiene CARE  Score 1   Tub/Shower Transfer   Reason Not Assessed Medical;Sponge Bath  (RLE wound vac x2)   Shower/Bathe Self   Type of Assistance Needed Physical assistance   Physical Assistance Level Total assistance   Comment would anticipate Ax2 to complete in stance without AD/AE to simulate baseline. pt completed SB bed level - supine for az bathing and seated upright for UB bathing. TA req for rear bathing only   Shower/Bathe Self CARE Score 1   Dressing/Undressing Clothing   Type of Assistance Needed Supervision   Physical Assistance Level No physical assistance   Comment seated EOB   Upper Body Dressing CARE Score 4   Type of Assistance Needed Physical assistance   Physical Assistance Level Total assistance   Comment would req Ax2 if performing at baseline - pt req TA for threading and vac line mgmt. pt was able to don over hips in supine with RLE elevated   Lower Body Dressing CARE Score 1   Putting On/Taking Off Footwear   Type of Assistance Needed Physical assistance   Physical Assistance Level 51%-75%   Comment able to don/doff L footwear, TA for R prevalon shoe which pt did keep on for transfers   Putting On/Taking Off Footwear CARE Score 2   Toileting Hygiene   Type of Assistance Needed Physical assistance   Physical Assistance Level Total assistance   Comment bed level toileting as per pt request at start of session   Toileting Hygiene CARE Score 1   Toilet Transfer   Type of Assistance Needed Physical assistance   Physical Assistance Level Total assistance   Comment Min A x2, wound vac mgmt of 2nd sit pivot > DA BSC   Toilet Transfer CARE Score 1   Transfer Bed/Chair/Wheelchair   Type of Assistance Needed Physical assistance   Physical Assistance Level Total assistance   Comment Min A x2, wound vac mgmt of 2nd sit pivot. better L than R. good maintenance of RLE NWB   Chair/Bed-to-Chair Transfer CARE Score 1   Roll Left and Right   Type of Assistance Needed Supervision   Physical Assistance Level No physical  "assistance   Roll Left and Right CARE Score 4   Sit to Lying   Type of Assistance Needed Supervision   Physical Assistance Level No physical assistance   Sit to Lying CARE Score 4   Lying to Sitting on Side of Bed   Type of Assistance Needed Supervision   Physical Assistance Level No physical assistance   Lying to Sitting on Side of Bed CARE Score 4   Comprehension   QI: Comprehension 4. Undestands: Clear comprehension without cues or repetitions   Expression   QI: Expression 4. Express complex messages without difficulty and with speech that is clear and easy to understan   RUE Assessment   RUE Assessment WFL   LUE Assessment   LUE Assessment WFL   Cognition   Overall Cognitive Status Impaired   Arousal/Participation Alert;Responsive;Cooperative   Attention Attends with cues to redirect   Orientation Level Oriented X4   Memory Decreased recall of precautions   Following Commands Follows one step commands with increased time or repetition   Vision   Vision Comments pt wears glasses for reading and driving   Discharge Information   Vocational Plan Retired/not working   Patient's Discharge Plan DC home mod I   Patient's Rehab Expectations \"Get me home asap\"   Barriers to Discharge Home Limited Family Support;Unsafe Home Setup;Decreased Strength;Decreased Endurance;Decreased Cognitive Function;Safety Considerations;Pain   Impressions Pt is a 78 y.o. female who was admitted on 2/26/24 due to osteomyelitis of the right foot. 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 Pt  has a past medical history of Anxiety, Closed fracture of coccyx ([facility]), Diabetes mellitus (HCC), GERD (gastroesophageal reflux disease), Hyperlipidemia, Hypertension, IBS (irritable bowel syndrome), and Shoulder fracture. Current precautions include bed/chair alarms, " cognitive, fall risk, Eek, impulsive, contact, multiple lines, pressure ulcer, supervision on toilet/commode. See flowsheet for details of pts home setup, PLOF and current functional status. Pts impairments include pain, endurance, activity tolerance, functional mobility, balance, trunk control, functional standing tolerance, unsupportive home environment, decreased I w/ ADLS/IADLS, strength, ROM, cognitive impairments, decreased safety awareness, and decreased insight into deficits. These impairments along with  limited caregiver support, steps to enter, difficulty performing ADLs, and difficulty performing IADLs causes the inability to safely complete A/IADLs including Grooming, Bathing, UB dressing, LB dressing, Toileting, Toilet transfer, Tub/shower Transfer, and IADL Management. Pt presents with good rehab potential with motivation to participate and achieve goal of DC home. Pt is unsafe to DC home at this time, recommending 2 weeks to achieve independent level goals with bedside commode, shower chair, and wheelchair . Plan to achieve stated goals with interventions focused on ADL Retraining ,  LHAE education/training, IADL training , Kitchen Mobilty, Meal preparation, Medication management , Functional Transfers, Functional Cognition, Functional Attention, MOCA, Standing tolerance, Standing balance , DME training/education, Family training/education, Energy conservation training/education, healthy coping education, Leisure and social pursuits, and community re-integration.   OT Therapy Minutes   OT Time In 0830   OT Time Out 1000   OT Total Time (minutes) 90   OT Mode of treatment - Individual (minutes) 90   OT Mode of treatment - Concurrent (minutes) 0   OT Mode of treatment - Group (minutes) 0   OT Mode of treatment - Co-treat (minutes) 0   OT Mode of Treatment - Total time(minutes) 90 minutes   OT Cumulative Minutes 90   Cumulative Minutes   Cumulative therapy minutes 90

## 2024-02-24 NOTE — PROGRESS NOTES
OT Long Term Goals       02/24/24 0830   Rehab Team Goals   ADL Team Goal Patient will be independent with ADLs with least restrictive device upon completion of rehab program   Rehab Team Interventions   OT Interventions Self Care;Home Management;Therapeutic Exercise;Community Reintegration;Energy Conservation;Patient/Family Education   Eating Goal   Eating Goal 06. Independent - Patient completes the activity by him/herself with no assistance from a helper.   Status Target goal - two weeks   Interventions Optimal Position   Grooming Goal   Oral Hygiene Goal 06. Independent - Patient completes the activity by him/herself with no assistance from a helper.   Status Target goal - two weeks   Intervention Assistive Device;Balance Work;Therapeutic Exercise;Tolerance Work   Tub/Shower Transfer Goal   Method Shower Stall   Assist Device Seat with Back   Status Target goal - two weeks  (dry transfer with supv)   Interventions ADL Training;Assistive Device   Bathing Goal   Shower/bathe self Goal 06. Independent - Patient completes the activity by him/herself with no assistance from a helper.   Status Target goal - two weeks   Intervention ADL Training;Assistive Device;Therapeutic Exercise   Upper Body Dressing Goal   Upper body dressing Goal 06. Independent - Patient completes the activity by him/herself with no assistance from a helper.   Status Target goal - two weeks   Intervention Assistive Device;Balance Work;Therapeutic Exercise;Tolerance Work   Lower Body Dressing Goal   Lower body dressing Goal 06. Independent - Patient completes the activity by him/herself with no assistance from a helper.   Putting on/taking off footwear Goal 06. Independent - Patient completes the activity by him/herself with no assistance from a helper.   Status Target goal - two weeks   Intervention Assistive Device;Balance Work;Therapeutic Exercise;Tolerance Work   Toileting Transfer Goal   Toilet transfer Goal 05. Setup or clean-up assistance  - [place] SETS UP or CLEANS UP, patient completes activity. [place] assists only prior to or following the activity.   Status Target goal - two weeks   Intervention ADL Training;Balance Work;Assistive Device   Toileting Goal   Toileting hygiene Goal 06. Independent - Patient completes the activity by him/herself with no assistance from a helper.   Status Target goal - two weeks   Intervention ADL Training;Balance Work;Assistive Device   Meal Prep and Kitchen Mobility   Assist Level Independent   Status Target goal - two weeks  (WC level, light meal prep)   Medication Management   Assist Level Independent   Status Target goal - two weeks

## 2024-02-25 LAB
GLUCOSE SERPL-MCNC: 105 MG/DL (ref 65–140)
GLUCOSE SERPL-MCNC: 129 MG/DL (ref 65–140)
GLUCOSE SERPL-MCNC: 155 MG/DL (ref 65–140)
GLUCOSE SERPL-MCNC: 163 MG/DL (ref 65–140)

## 2024-02-25 PROCEDURE — 82948 REAGENT STRIP/BLOOD GLUCOSE: CPT

## 2024-02-25 PROCEDURE — 99232 SBSQ HOSP IP/OBS MODERATE 35: CPT | Performed by: INTERNAL MEDICINE

## 2024-02-25 RX ADMIN — PANTOPRAZOLE SODIUM 40 MG: 40 TABLET, DELAYED RELEASE ORAL at 05:48

## 2024-02-25 RX ADMIN — PREGABALIN 200 MG: 100 CAPSULE ORAL at 21:08

## 2024-02-25 RX ADMIN — RIVAROXABAN 2.5 MG: 2.5 TABLET, FILM COATED ORAL at 17:05

## 2024-02-25 RX ADMIN — METFORMIN HYDROCHLORIDE 500 MG: 500 TABLET, FILM COATED ORAL at 16:20

## 2024-02-25 RX ADMIN — ACETAMINOPHEN 975 MG: 325 TABLET, FILM COATED ORAL at 21:07

## 2024-02-25 RX ADMIN — ASPIRIN 81 MG: 81 TABLET, COATED ORAL at 08:56

## 2024-02-25 RX ADMIN — ATORVASTATIN CALCIUM 10 MG: 10 TABLET, FILM COATED ORAL at 17:05

## 2024-02-25 RX ADMIN — SENNOSIDES, DOCUSATE SODIUM 1 TABLET: 8.6; 5 TABLET ORAL at 17:05

## 2024-02-25 RX ADMIN — METHOCARBAMOL 250 MG: 500 TABLET ORAL at 21:07

## 2024-02-25 RX ADMIN — CEFPODOXIME PROXETIL 400 MG: 200 TABLET, FILM COATED ORAL at 08:58

## 2024-02-25 RX ADMIN — BUPROPION HYDROCHLORIDE 300 MG: 150 TABLET, EXTENDED RELEASE ORAL at 08:57

## 2024-02-25 RX ADMIN — METFORMIN HYDROCHLORIDE 500 MG: 500 TABLET, FILM COATED ORAL at 08:59

## 2024-02-25 RX ADMIN — CLONAZEPAM 1 MG: 1 TABLET ORAL at 21:08

## 2024-02-25 RX ADMIN — OXYBUTYNIN CHLORIDE 5 MG: 5 TABLET, EXTENDED RELEASE ORAL at 08:56

## 2024-02-25 RX ADMIN — INSULIN LISPRO 1 UNITS: 100 INJECTION, SOLUTION INTRAVENOUS; SUBCUTANEOUS at 06:18

## 2024-02-25 RX ADMIN — ACETAMINOPHEN 975 MG: 325 TABLET, FILM COATED ORAL at 14:46

## 2024-02-25 RX ADMIN — PREGABALIN 200 MG: 100 CAPSULE ORAL at 16:20

## 2024-02-25 RX ADMIN — CYANOCOBALAMIN TAB 500 MCG 1000 MCG: 500 TAB at 08:56

## 2024-02-25 RX ADMIN — NICOTINE 1 PATCH: 14 PATCH, EXTENDED RELEASE TRANSDERMAL at 08:56

## 2024-02-25 RX ADMIN — ACETAMINOPHEN 975 MG: 325 TABLET, FILM COATED ORAL at 05:48

## 2024-02-25 RX ADMIN — METHOCARBAMOL 250 MG: 500 TABLET ORAL at 14:46

## 2024-02-25 RX ADMIN — INSULIN LISPRO 1 UNITS: 100 INJECTION, SOLUTION INTRAVENOUS; SUBCUTANEOUS at 11:09

## 2024-02-25 RX ADMIN — FLUTICASONE FUROATE AND VILANTEROL TRIFENATATE 1 PUFF: 200; 25 POWDER RESPIRATORY (INHALATION) at 08:58

## 2024-02-25 RX ADMIN — CEFPODOXIME PROXETIL 400 MG: 200 TABLET, FILM COATED ORAL at 17:05

## 2024-02-25 RX ADMIN — SENNOSIDES, DOCUSATE SODIUM 1 TABLET: 8.6; 5 TABLET ORAL at 08:56

## 2024-02-25 RX ADMIN — OMEGA-3 FATTY ACIDS CAP 1000 MG 1000 MG: 1000 CAP at 08:57

## 2024-02-25 RX ADMIN — Medication 1 TABLET: at 08:56

## 2024-02-25 RX ADMIN — RIVAROXABAN 2.5 MG: 2.5 TABLET, FILM COATED ORAL at 13:25

## 2024-02-25 RX ADMIN — PREGABALIN 200 MG: 100 CAPSULE ORAL at 08:56

## 2024-02-25 RX ADMIN — METHOCARBAMOL 250 MG: 500 TABLET ORAL at 05:47

## 2024-02-25 RX ADMIN — POLYETHYLENE GLYCOL 3350 17 G: 17 POWDER, FOR SOLUTION ORAL at 08:56

## 2024-02-25 NOTE — PHYSICAL THERAPY NOTE
Call to daughterAriela. LVM requesting call back as PT would like to provide update on current functional status as well as obtaining pictures of home set up.

## 2024-02-25 NOTE — ASSESSMENT & PLAN NOTE
Continue DM diet; Accuchecks/SSI  Home: Januvia 100mg qd/Metformin 1000mg qd  Here: Metformin 500mg BID  Continue QID Accuchecks/SSI and DM diet  Today 2/25/24, restarted Metformin 500 mg BID

## 2024-02-25 NOTE — ASSESSMENT & PLAN NOTE
S/p right axillary to femoral BPG 1/31/24  Right axillary I&D/hematoma evacuation 2/4/24  Debridement right groin/washout/VAC dressing 2/14/24 for infection  Repeat right groin washout 2/19/24 for infection  Continue ASA, Xarelto and atorvastatin.  Encouraged tobacco cessation.  Price check on Xarelto = $20.00/mo   VAC dressing per GS

## 2024-02-25 NOTE — PROGRESS NOTES
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)  Wound cx were positive for Klebsiella and Proteus  S/p OR 2/14 and 2/19 for debride and washout  Currently has VAC dressing on the right groin  Needs long term antibiotic 2/2 proximity of the wound infection to the bypass graft  Continue Cefpodoxime 400 mg q12hrs x 28 days starting 2/23/24    Diabetic foot ulcer with osteomyelitis (HCC)  Assessment & Plan  s/p right foot wound/achilles debridement with foot/toe washout; partial amputation distal right 2nd toe; wound VAC 2/4/24   Podiatry following  VAC changes per Podiatry    PAD (peripheral artery disease) (HCC)  Assessment & Plan  S/p right axillary to femoral BPG 1/31/24  Right axillary I&D/hematoma evacuation 2/4/24  Debridement right groin/washout/VAC dressing 2/14/24 for infection  Repeat right groin washout 2/19/24 for infection  Continue ASA, Xarelto and atorvastatin.  Encouraged tobacco cessation.  Price check on Xarelto = $20.00/mo   VAC dressing per GS    Hematoma  Assessment & Plan  S/p I&D hematoma/seroma of right axillary dissection operative site 2/4/24   stable    Anxiety and depression  Assessment & Plan  Continue home medications Klonopin and Wellbutrin     Essential hypertension  Assessment & Plan  Home: Amiloride 5mg qd/Lisinopril 5mg qd  Here: no meds  Will watch    Type 2 diabetes mellitus with diabetic polyneuropathy (HCC)  Assessment & Plan  Continue DM diet; Accuchecks/SSI  Home: Januvia 100mg qd/Metformin 1000mg qd  Here: Metformin 500mg BID  Continue QID Accuchecks/SSI and DM diet  Today 2/25/24, restarted Metformin 500 mg BID     Sacral wound  Assessment & Plan  POA to [facility] to see  Management by  PMR    Other dietary vitamin B12 deficiency anemia  Assessment & Plan  Vitamin B12 level was 93  Got 3 doses of IM B12 and is currently on PO  MMA pending    Anemia  Assessment & Plan  Stable  Will watch    Tobacco use disorder  Assessment & Plan  Continue nicotine patch    COPD, severity to be determined (HCC)  Assessment & Plan  Continue Breo and albuterol.  Pt uses Advair 250/50 and albuterol at home.  Respiratory status stable         The above assessment and plan was reviewed and updated as determined by my evaluation of the patient on 2/25/2024.    Labs:   Results from last 7 days   Lab Units 02/23/24  0553 02/21/24  0507   WBC Thousand/uL 7.99 9.65   HEMOGLOBIN g/dL 10.9* 11.0*   HEMATOCRIT % 35.3 34.4*   PLATELETS Thousands/uL 315 343     Results from last 7 days   Lab Units 02/23/24  0553 02/21/24  0507   SODIUM mmol/L 137 142   POTASSIUM mmol/L 3.9 4.1   CHLORIDE mmol/L 100 103   CO2 mmol/L 30 32   BUN mg/dL 17 10   CREATININE mg/dL 0.59* 0.55*   CALCIUM mg/dL 9.4 9.7             Results from last 7 days   Lab Units 02/25/24  1053 02/25/24  0546 02/24/24  2103   POC GLUCOSE mg/dl 163* 155* 119       Imaging  No orders to display       Review of Scheduled Meds:  Current Facility-Administered Medications   Medication Dose Route Frequency Provider Last Rate    acetaminophen  975 mg Oral Q8H [facility] ROXANE Gaona      albuterol  2 puff Inhalation Q6H PRN ROXANE Gaona      aspirin  81 mg Oral Daily ROXANE Gaona      atorvastatin  10 mg Oral After Dinner ROXANE Gaona      bisacodyl  5 mg Oral Daily PRN ROXANE Gaona      buPROPion  300 mg Oral Daily ROXANE Gaona      cefpodoxime  400 mg Oral BID With Meals ROXANE Gaona      clonazePAM  1 mg Oral QPM ROXANE Gaona      vitamin B-12  1,000 mcg Oral Daily ROXANE Gaona      fish oil  1,000 mg Oral Daily ROXANE Gaona       fluticasone-vilanterol  1 puff Inhalation Daily Melany Bautista, CRNP      insulin lispro  1-5 Units Subcutaneous TID AC Melany Bautista, CRNP      insulin lispro  1-5 Units Subcutaneous HS Melany Bautista, CRNP      metFORMIN  500 mg Oral BID With Meals Melany Bautista, CRNP      methocarbamol  250 mg Oral Q8H JILLIAN Melany Bautista, CRNP      multivitamin-minerals  1 tablet Oral Daily Melany Bautista, CRNP      nicotine  1 patch Transdermal Daily Melany Bautista, CRNP      ondansetron  4 mg Oral Q6H PRN Melany Bautista, CRNP      oxybutynin  5 mg Oral Daily Melany Bautista, CRNP      oxyCODONE  2.5 mg Oral Q4H PRN Melany Bautista, CRNP      Or    oxyCODONE  5 mg Oral Q4H PRN Melany Bautista, CRNP      pantoprazole  40 mg Oral Early Morning Melany Bautista, CRNP      polyethylene glycol  17 g Oral Daily Melany Bautista, CRNP      pregabalin  200 mg Oral TID Melany Bautista, CRNP      rivaroxaban  2.5 mg Oral BID With Meals Melany Bautista, CRNP      senna-docusate sodium  1 tablet Oral BID Melany Bautista, CRNP         Subjective/ HPI: Patient seen and examined. Patients overnight issues or events were reviewed with nursing staff. New or overnight issues include the following:     No new or overnight issues.  Offers no complaints    ROS:   A 10 point ROS was performed; negative except as noted above.        *Labs /Radiology studies Reviewed  *Medications  reviewed and reconciled as needed  *Please refer to order section for additional ordered labs studies      Physical Examination:  Vitals:   Vitals:    02/24/24 1410 02/24/24 2042 02/25/24 0500 02/25/24 0616   BP: 129/69 111/58  105/69   BP Location: Left arm Left arm     Pulse: 82 75  70   Resp: 14 15  16   Temp: 98 °F (36.7 °C) 98.5 °F (36.9 °C)  97.7 °F (36.5 °C)   TempSrc: Oral Oral  Oral   SpO2: 92% 93%  92%   Weight:   69.4 kg (153 lb)            The above assessment  and plan was reviewed and updated as determined by my evaluation of the patient on 2/25/2024.    Labs:   Results from last 7 days   Lab Units 02/23/24  0553 02/21/24  0507   WBC Thousand/uL 7.99 9.65   HEMOGLOBIN g/dL 10.9* 11.0*   HEMATOCRIT % 35.3 34.4*   PLATELETS Thousands/uL 315 343     Results from last 7 days   Lab Units 02/23/24  0553 02/21/24  0507   SODIUM mmol/L 137 142   POTASSIUM mmol/L 3.9 4.1   CHLORIDE mmol/L 100 103   CO2 mmol/L 30 32   BUN mg/dL 17 10   CREATININE mg/dL 0.59* 0.55*   CALCIUM mg/dL 9.4 9.7             Results from last 7 days   Lab Units 02/25/24  1053 02/25/24  0546 02/24/24  2103   POC GLUCOSE mg/dl 163* 155* 119       Imaging  No orders to display       Review of Scheduled Meds:  Current Facility-Administered Medications   Medication Dose Route Frequency Provider Last Rate    acetaminophen  975 mg Oral Q8H [facility] ROXANE Gaona      albuterol  2 puff Inhalation Q6H PRN ROXANE Gaona      aspirin  81 mg Oral Daily ROXANE Gaona      atorvastatin  10 mg Oral After Dinner ROXANE Gaona      bisacodyl  5 mg Oral Daily PRN Melany Bautista, ROXANE      buPROPion  300 mg Oral Daily ROXANE Gaona      cefpodoxime  400 mg Oral BID With Meals ROXANE Gaona      clonazePAM  1 mg Oral QPM ROXANE Gaona      vitamin B-12  1,000 mcg Oral Daily ROXANE Gaona      fish oil  1,000 mg Oral Daily ROXANE Gaona      fluticasone-vilanterol  1 puff Inhalation Daily ROXANE Gaona      insulin lispro  1-5 Units Subcutaneous TID AC ROXANE Gaona      insulin lispro  1-5 Units Subcutaneous HS ROXANE Gaona      metFORMIN  500 mg Oral BID With Meals ROXANE Gaona      methocarbamol  250 mg Oral Q8H [facility] ROXANE Gaona      multivitamin-minerals  1 tablet Oral Daily ROXANE Gaona      nicotine  1 patch  Transdermal Daily Melany Bautista, ROXANE      ondansetron  4 mg Oral Q6H PRN Melany Bautista, CRNP      oxybutynin  5 mg Oral Daily Melany Bautista, CRNP      oxyCODONE  2.5 mg Oral Q4H PRN Melany Bautista, CRNP      Or    oxyCODONE  5 mg Oral Q4H PRN Melany Bautista, CRNP      pantoprazole  40 mg Oral Early Morning Melany Bautista, CRNP      polyethylene glycol  17 g Oral Daily Melany Bautista, CRNP      pregabalin  200 mg Oral TID Melany Bautista, CRNP      rivaroxaban  2.5 mg Oral BID With Meals Melany Bautista, FAUSTONP      senna-docusate sodium  1 tablet Oral BID Melany Bautista, ROXANE         Subjective/ HPI: Patient seen and examined. Patients overnight issues or events were reviewed with nursing staff. New or overnight issues include the following:     No new or overnight issues.  Offers no complaints    ROS:   A 10 point ROS was performed; negative except as noted above.        *Labs /Radiology studies Reviewed  *Medications  reviewed and reconciled as needed  *Please refer to order section for additional ordered labs studies      Physical Examination:  Vitals:   Vitals:    02/24/24 1410 02/24/24 2042 02/25/24 0500 02/25/24 0616   BP: 129/69 111/58  105/69   BP Location: Left arm Left arm     Pulse: 82 75  70   Resp: 14 15  16   Temp: 98 °F (36.7 °C) 98.5 °F (36.9 °C)  97.7 °F (36.5 °C)   TempSrc: Oral Oral  Oral   SpO2: 92% 93%  92%   Weight:   69.4 kg (153 lb)        General Appearance: no distress, nontoxic appearing  HEENT:  External ear normal.  Nose normal w/o drainage. Mucous membranes are moist. Oropharynx is clear. Conjunctiva clear w/o icterus or redness.  Neck:  Supple, normal ROM  Lungs: BBS without crackles/wheeze/rhonchi; respirations unlabored with normal inspiratory/expiratory effort.  No retractions noted.  On RA  CV: regular rate and rhythm; no rubs/murmurs/gallops, PMI normal   ABD: Abdomen is soft.  Bowel sounds all quadrants.   Nontender with no distention.    EXT: no edema  Skin: normal turgor, normal texture, no rashes  Psych: affect normal, mood normal  Neuro: AAO         The above physical exam was reviewed and updated as determined by my evaluation of the patient on 2/25/2024.    Invasive Devices       Peripheral Intravenous Line  Duration             Peripheral IV 02/21/24 Dorsal (posterior);Left Forearm 4 days              Drain  Duration             Ureteral Internal Stent Left ureter 6 Fr. 97 days                       VTE Pharmacologic Prophylaxis: Xarelto  Code Status: Level 3 - DNAR and DNI  Current Length of Stay: 2 day(s)    Total floor / unit time spent today 30 minutes  Coordination of patient's care was performed in conjunction with primary service. Time invested included review of patient's labs, vitals, and management of their comorbidities with continued monitoring, examination of patient as well as answering patient questions, documenting her findings and creating progress note in electronic medical record,  ordering appropriate diagnostic testing.       ** Please Note:  voice to text software may have been used in the creation of this document. Although proof errors in transcription or interpretation are a potential of such software**         The above physical exam was reviewed and updated as determined by my evaluation of the patient on 2/25/2024.    Invasive Devices       Peripheral Intravenous Line  Duration             Peripheral IV 02/21/24 Dorsal (posterior);Left Forearm 4 days              Drain  Duration             Ureteral Internal Stent Left ureter 6 Fr. 97 days                       VTE Pharmacologic Prophylaxis: Xarelto  Code Status: Level 3 - DNAR and DNI  Current Length of Stay: 2 day(s)    Total floor / unit time spent today 30 minutes  Coordination of patient's care was performed in conjunction with primary service. Time invested included review of patient's labs, vitals, and management of their  comorbidities with continued monitoring, examination of patient as well as answering patient questions, documenting her findings and creating progress note in electronic medical record,  ordering appropriate diagnostic testing.       ** Please Note:  voice to text software may have been used in the creation of this document. Although proof errors in transcription or interpretation are a potential of such software**

## 2024-02-25 NOTE — ASSESSMENT & PLAN NOTE
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

## 2024-02-26 LAB
GLUCOSE SERPL-MCNC: 115 MG/DL (ref 65–140)
GLUCOSE SERPL-MCNC: 141 MG/DL (ref 65–140)
GLUCOSE SERPL-MCNC: 203 MG/DL (ref 65–140)
GLUCOSE SERPL-MCNC: 99 MG/DL (ref 65–140)

## 2024-02-26 PROCEDURE — 99232 SBSQ HOSP IP/OBS MODERATE 35: CPT | Performed by: INTERNAL MEDICINE

## 2024-02-26 PROCEDURE — 97110 THERAPEUTIC EXERCISES: CPT

## 2024-02-26 PROCEDURE — 82948 REAGENT STRIP/BLOOD GLUCOSE: CPT

## 2024-02-26 PROCEDURE — 99232 SBSQ HOSP IP/OBS MODERATE 35: CPT | Performed by: STUDENT IN AN ORGANIZED HEALTH CARE EDUCATION/TRAINING PROGRAM

## 2024-02-26 PROCEDURE — 99232 SBSQ HOSP IP/OBS MODERATE 35: CPT | Performed by: PODIATRIST

## 2024-02-26 PROCEDURE — 2W1NX6Z COMPRESSION OF RIGHT UPPER LEG USING PRESSURE DRESSING: ICD-10-PCS | Performed by: PODIATRIST

## 2024-02-26 PROCEDURE — 97605 NEG PRS WND THER DME<=50SQCM: CPT | Performed by: PODIATRIST

## 2024-02-26 PROCEDURE — 97530 THERAPEUTIC ACTIVITIES: CPT

## 2024-02-26 RX ADMIN — ACETAMINOPHEN 975 MG: 325 TABLET, FILM COATED ORAL at 21:36

## 2024-02-26 RX ADMIN — CLONAZEPAM 1 MG: 1 TABLET ORAL at 20:28

## 2024-02-26 RX ADMIN — ASPIRIN 81 MG: 81 TABLET, COATED ORAL at 09:45

## 2024-02-26 RX ADMIN — PREGABALIN 200 MG: 100 CAPSULE ORAL at 21:37

## 2024-02-26 RX ADMIN — PANTOPRAZOLE SODIUM 40 MG: 40 TABLET, DELAYED RELEASE ORAL at 05:57

## 2024-02-26 RX ADMIN — BUPROPION HYDROCHLORIDE 300 MG: 150 TABLET, EXTENDED RELEASE ORAL at 09:45

## 2024-02-26 RX ADMIN — RIVAROXABAN 2.5 MG: 2.5 TABLET, FILM COATED ORAL at 16:47

## 2024-02-26 RX ADMIN — POLYETHYLENE GLYCOL 3350 17 G: 17 POWDER, FOR SOLUTION ORAL at 09:46

## 2024-02-26 RX ADMIN — OMEGA-3 FATTY ACIDS CAP 1000 MG 1000 MG: 1000 CAP at 09:45

## 2024-02-26 RX ADMIN — METFORMIN HYDROCHLORIDE 500 MG: 500 TABLET, FILM COATED ORAL at 16:46

## 2024-02-26 RX ADMIN — PREGABALIN 200 MG: 100 CAPSULE ORAL at 09:46

## 2024-02-26 RX ADMIN — METHOCARBAMOL 250 MG: 500 TABLET ORAL at 14:30

## 2024-02-26 RX ADMIN — INSULIN LISPRO 1 UNITS: 100 INJECTION, SOLUTION INTRAVENOUS; SUBCUTANEOUS at 12:05

## 2024-02-26 RX ADMIN — CYANOCOBALAMIN TAB 500 MCG 1000 MCG: 500 TAB at 09:45

## 2024-02-26 RX ADMIN — OXYBUTYNIN CHLORIDE 5 MG: 5 TABLET, EXTENDED RELEASE ORAL at 09:45

## 2024-02-26 RX ADMIN — METFORMIN HYDROCHLORIDE 500 MG: 500 TABLET, FILM COATED ORAL at 09:45

## 2024-02-26 RX ADMIN — PREGABALIN 200 MG: 100 CAPSULE ORAL at 16:46

## 2024-02-26 RX ADMIN — CEFPODOXIME PROXETIL 400 MG: 200 TABLET, FILM COATED ORAL at 16:47

## 2024-02-26 RX ADMIN — ACETAMINOPHEN 975 MG: 325 TABLET, FILM COATED ORAL at 05:57

## 2024-02-26 RX ADMIN — SENNOSIDES, DOCUSATE SODIUM 1 TABLET: 8.6; 5 TABLET ORAL at 17:12

## 2024-02-26 RX ADMIN — ACETAMINOPHEN 975 MG: 325 TABLET, FILM COATED ORAL at 14:30

## 2024-02-26 RX ADMIN — NICOTINE 1 PATCH: 14 PATCH, EXTENDED RELEASE TRANSDERMAL at 09:52

## 2024-02-26 RX ADMIN — METHOCARBAMOL 250 MG: 500 TABLET ORAL at 21:37

## 2024-02-26 RX ADMIN — RIVAROXABAN 2.5 MG: 2.5 TABLET, FILM COATED ORAL at 09:45

## 2024-02-26 RX ADMIN — METHOCARBAMOL 250 MG: 500 TABLET ORAL at 05:57

## 2024-02-26 RX ADMIN — ATORVASTATIN CALCIUM 10 MG: 10 TABLET, FILM COATED ORAL at 17:12

## 2024-02-26 RX ADMIN — Medication 1 TABLET: at 09:45

## 2024-02-26 RX ADMIN — FLUTICASONE FUROATE AND VILANTEROL TRIFENATATE 1 PUFF: 200; 25 POWDER RESPIRATORY (INHALATION) at 09:53

## 2024-02-26 RX ADMIN — CEFPODOXIME PROXETIL 400 MG: 200 TABLET, FILM COATED ORAL at 09:45

## 2024-02-26 RX ADMIN — OXYCODONE HYDROCHLORIDE 5 MG: 5 TABLET ORAL at 18:05

## 2024-02-26 RX ADMIN — SENNOSIDES, DOCUSATE SODIUM 1 TABLET: 8.6; 5 TABLET ORAL at 09:45

## 2024-02-26 NOTE — DISCHARGE INSTR - OTHER ORDERS
Skin Care Plan:  1-Right Groin Wound: per vascular recommendations    2-Turn/reposition q2h or when medically stable for pressure re-distribution on skin .  3-Elevate heels to offload pressure.  4-Moisturize skin daily with skin nourishing cream  5-Ehob cushion in chair when out of bed.  6-RLE and Right Foot: per podiatry recommendations.   7-Left Heel: Apply Silicone Border Foam (Mepilex) to areas. Gibson with P for Prevention and change every 3 days or PRN soilage/displacement. Peel back and inspect Q-shift.  8-Mid Sacrum Wound: Cleanse with soap and water. Pat dry. Apply triad paste to mid sacrum wound and cover with a Silicone Bordered Foam Dressing. Gibson with T for Treatment and change every day or PRN soilage/displacement

## 2024-02-26 NOTE — PROGRESS NOTES
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)  Wound cx were positive for Klebsiella and Proteus  S/p OR 2/14 and 2/19 for debride and washout  Currently has VAC dressing on the right groin  Needs long term antibiotic 2/2 proximity of the wound infection to the bypass graft  Continue Cefpodoxime 400 mg q12hrs x 28 days starting 2/23/24    Other dietary vitamin B12 deficiency anemia  Assessment & Plan  Vitamin B12 level was 93  Got 3 doses of IM B12 and is currently on PO  MMA pending    Anemia  Assessment & Plan  Stable  Will watch    Tobacco use disorder  Assessment & Plan  Continue nicotine patch    COPD, severity to be determined ([facility])  Assessment & Plan  Continue Breo and albuterol.  Pt uses Advair 250/50 and albuterol at home.  Respiratory status stable    PAD (peripheral artery disease) ([facility])  Assessment & Plan  S/p right axillary to femoral BPG 1/31/24  Right axillary I&D/hematoma evacuation 2/4/24  Debridement right groin/washout/VAC dressing 2/14/24 for infection  Repeat right groin washout 2/19/24 for infection  Continue ASA, Xarelto and atorvastatin.  Encouraged tobacco cessation.  Price check on Xarelto = $20.00/mo   VAC dressing per     Anxiety and depression  Assessment & Plan  Continue home medications Klonopin and Wellbutrin     Essential hypertension  Assessment & Plan  Home: Amiloride 5mg qd/Lisinopril 5mg qd  Here: no meds  Will watch    Type 2 diabetes mellitus with diabetic  polyneuropathy (HCC)  Assessment & Plan  Continue DM diet; Accuchecks/SSI  Home: Januvia 100mg qd/Metformin 1000mg qd  Here: Metformin 500mg BID  Continue QID Accuchecks/SSI and DM diet    Sacral wound  Assessment & Plan  POA to [facility]  WC to see  Management by PMR    Hematoma  Assessment & Plan  S/p I&D hematoma/seroma of right axillary dissection operative site 2/4/24   stable           The above assessment and plan was reviewed and updated as determined by my evaluation of the patient on 2/26/2024.    Labs:   Results from last 7 days   Lab Units 02/23/24  0553 02/21/24  0507   WBC Thousand/uL 7.99 9.65   HEMOGLOBIN g/dL 10.9* 11.0*   HEMATOCRIT % 35.3 34.4*   PLATELETS Thousands/uL 315 343     Results from last 7 days   Lab Units 02/23/24  0553 02/21/24  0507   SODIUM mmol/L 137 142   POTASSIUM mmol/L 3.9 4.1   CHLORIDE mmol/L 100 103   CO2 mmol/L 30 32   BUN mg/dL 17 10   CREATININE mg/dL 0.59* 0.55*   CALCIUM mg/dL 9.4 9.7             Results from last 7 days   Lab Units 02/26/24  1101 02/26/24  0630 02/25/24  2043   POC GLUCOSE mg/dl 203* 141* 129       Imaging  No orders to display       Review of Scheduled Meds:  Current Facility-Administered Medications   Medication Dose Route Frequency Provider Last Rate    acetaminophen  975 mg Oral Q8H [facility] ROXANE Gaona      albuterol  2 puff Inhalation Q6H PRN ROXANE Gaona      aspirin  81 mg Oral Daily ROXANE Gaona      atorvastatin  10 mg Oral After Dinner ROXANE Gaona      bisacodyl  5 mg Oral Daily PRN ROXANE Gaona      buPROPion  300 mg Oral Daily ROXANE Gaona      cefpodoxime  400 mg Oral BID With Meals ROXANE Gaona      clonazePAM  1 mg Oral QPM ROXANE Gaona      vitamin B-12  1,000 mcg Oral Daily ROXANE Gaona      fish oil  1,000 mg Oral Daily ROXANE Gaona      fluticasone-vilanterol  1 puff Inhalation Daily Melany  Svitlana Bautista, ROXANE      insulin lispro  1-5 Units Subcutaneous TID AC Melany Bautista, CRNP      insulin lispro  1-5 Units Subcutaneous HS Melany Bautista, ROXANE      metFORMIN  500 mg Oral BID With Meals Melany Bautista, FAUSTONP      methocarbamol  250 mg Oral Q8H JILLIAN Melany Bautista, CRNP      multivitamin-minerals  1 tablet Oral Daily Melany Bautista, CRNP      nicotine  1 patch Transdermal Daily Melany Bautista, FAUSTONP      ondansetron  4 mg Oral Q6H PRN Melany Bautista, CRNP      oxybutynin  5 mg Oral Daily Melany Bautista, CRNP      oxyCODONE  2.5 mg Oral Q4H PRN Melany Bautista, FAUSTONP      Or    oxyCODONE  5 mg Oral Q4H PRN Melany Bautista, CRNP      pantoprazole  40 mg Oral Early Morning Melany Bautista, CRNP      polyethylene glycol  17 g Oral Daily Melany Bautista, CRNP      pregabalin  200 mg Oral TID Melany Bautista, CRNP      rivaroxaban  2.5 mg Oral BID With Meals Melany Bautista, ROXANE      senna-docusate sodium  1 tablet Oral BID Melany Bautista, FAUSTONP         Subjective/ HPI: Patient seen and examined. Patients overnight issues or events were reviewed with nursing staff. New or overnight issues include the following:     Pt seen in her room. She states that she is doing well. She denies any current complaints.    ROS:   A 10 point ROS was performed; negative except as noted above.        *Labs /Radiology studies Reviewed  *Medications  reviewed and reconciled as needed  *Please refer to order section for additional ordered labs studies      Physical Examination:  Vitals:   Vitals:    02/25/24 0616 02/25/24 1337 02/25/24 2100 02/26/24 0545   BP: 105/69 114/60 104/59 112/55   BP Location:  Right arm Right arm Right arm   Pulse: 70 70 67 78   Resp: 16 16 20 18   Temp: 97.7 °F (36.5 °C) 97.8 °F (36.6 °C) 98 °F (36.7 °C) 98.2 °F (36.8 °C)   TempSrc: Oral Oral Oral Oral   SpO2: 92% 92% 91% 93%   Weight:           General  Appearance: NAD; pleasant  HEENT: PERRLA, conjuctiva normal; mucous membranes moist; face symmetrical  Neck:  Supple  Lungs: clear bilaterally, normal respiratory effort, no retractions, expiratory effort normal, on room air  CV: regular rate and rhythm, no murmurs rubs or gallops noted, chest wall hematoma decreasing in size.  ABD: soft non tender, +BS x4, wound vac present lower Rt abdomen  EXT: no edema, wound vac to Rt foot.  Skin: normal turgor, normal texture, no rash  Psych: affect normal, mood normal  Neuro: AAOx3       The above physical exam was reviewed and updated as determined by my evaluation of the patient on 2/26/2024.    Invasive Devices       Peripheral Intravenous Line  Duration             Peripheral IV 02/21/24 Dorsal (posterior);Left Forearm 5 days              Drain  Duration             Ureteral Internal Stent Left ureter 6 Fr. 98 days                       VTE Pharmacologic Prophylaxis: Xarelto  Code Status: Level 3 - DNAR and DNI  Current Length of Stay: 3 day(s)    Total floor / unit time spent today 30 minutes  Coordination of patient's care was performed in conjunction with primary service. Time invested included review of patient's labs, vitals, and management of their comorbidities with continued monitoring, examination of patient as well as answering patient questions, documenting her findings and creating progress note in electronic medical record,  ordering appropriate diagnostic testing.       ** Please Note:  voice to text software may have been used in the creation of this document. Although proof errors in transcription or interpretation are a potential of such software**

## 2024-02-26 NOTE — PROGRESS NOTES
Podiatry - Progress Note  Patient: Lona Cedeno 78 y.o. female   MRN: [id number]  PCP: Vivek Preston DO  Unit/Bed#: -01 Encounter: [id number]  Date Of Visit: 24    ASSESSMENT:    Lona Cedeno is a 78 y.o. female with:    Right posterior ankle ulcer with exposed achilles tendon  - Right wound debridement (DOS: 24)  Right medial ankle ulcer, limited to breakdown of skin  Osteomyelitis of right second toe  - Right 2nd toe amputation (DOS: 24)  T2DM  PAD  Tobacco use disorder  Diabetic polyneuropathy      PLAN:    VAC dressings changed today. Wound is stable, no acute clinical signs of infection. Ready for STSG, pending attending availability. No concrete plans for OR at this time.  Elevation and offloading on green foam wedges or pillows when non-ambulatory.  Rest of care per primary team.    Wound VAC application  1. Number of sponges removed: 1  2. Pressure settin continuous  3. Size of wound: 5x4x0.2  4. Description of wound: granular, bleeding   5. Number of sponges applied: 1     Weightbearing status: Non-weightbearing right foot    SUBJECTIVE:     The patient was seen, evaluated, and assessed at bedside today. The patient was awake, alert, and in no acute distress. No acute events overnight. The patient reports no pain to RLE. Patient denies N/V/F/chills/SOB/CP.    OBJECTIVE:     Vitals:   /55 (BP Location: Right arm)   Pulse 78   Temp 98.2 °F (36.8 °C) (Oral)   Resp 18   Wt 69.4 kg (153 lb)   SpO2 93%   BMI 28.91 kg/m²     Temp (24hrs), Av °F (36.7 °C), Min:97.8 °F (36.6 °C), Max:98.2 °F (36.8 °C)      Physical Exam:     Lungs: Non labored breathing  Abdomen: Soft, non-tender.  Lower Extremity:  Cardiovascular status at baseline from admission.  Neurological status at baseline from admission.  Musculoskeletal status at baseline from admission. No calf tenderness noted.     Wound #: 1  Location: right posterior ankle  Length 5cm: Width 4cm: Depth 0.2cm:   Deepest  "Tissue Noted in Base: subcutaneous  Probe to Bone: No  Peripheral Skin Description: Attached  Granulation: 100% Fibrotic Tissue: 0% Necrotic Tissue: 0%   Drainage Amount: moderate bloody  Signs of Infection: No    Wound #: 2  Location: right medial foot  Length 2cm: Width 1.5cm: Depth 0.1cm:   Deepest Tissue Noted in Base: subcutaneous  Probe to Bone: No  Peripheral Skin Description: Attached  Granulation: 80% Fibrotic Tissue: 20% Necrotic Tissue: 0%   Drainage Amount: moderate serous  Signs of Infection: No    Clinical Images 02/26/24:            Additional Data:     Labs:    Results from last 7 days   Lab Units 02/23/24  0553   WBC Thousand/uL 7.99   HEMOGLOBIN g/dL 10.9*   HEMATOCRIT % 35.3   PLATELETS Thousands/uL 315   NEUTROS PCT % 58   LYMPHS PCT % 25   MONOS PCT % 7   EOS PCT % 9*     Results from last 7 days   Lab Units 02/23/24  0553   POTASSIUM mmol/L 3.9   CHLORIDE mmol/L 100   CO2 mmol/L 30   BUN mg/dL 17   CREATININE mg/dL 0.59*   CALCIUM mg/dL 9.4           * I Have Reviewed All Lab Data Listed Above.    Recent Cultures (last 7 days):               Imaging: I have personally reviewed pertinent films in PACS  EKG, Pathology, and Other Studies: I have personally reviewed pertinent reports.    ** Please Note: Portions of the record may have been created with voice recognition software. Occasional wrong word or \"sound a like\" substitutions may have occurred due to the inherent limitations of voice recognition software. Read the chart carefully and recognize, using context, where substitutions have occurred. **      "

## 2024-02-26 NOTE — ASSESSMENT & PLAN NOTE
Continue DM diet; Accuchecks/SSI  Home: Januvia 100mg qd/Metformin 1000mg qd  Here: Metformin 500mg BID  Continue QID Accuchecks/SSI and DM diet

## 2024-02-26 NOTE — WOUND OSTOMY CARE
Consult Note - Wound   Lona Cedeno 78 y.o. female MRN: [id number]  Unit/Bed#: [facility] 972-01 Encounter: [id number]        History and Present Illness:  Patient is a 79 yo female that was admitted to [facility] for rehab post hospitalization for surgical site infection.  Patient has a PMH of PAD, DM foot ulceration. Patient is a min/mod for turning and repositioning. Patient is continent of bowel and bladder. On assessment, patient is lying on regular mattress.     Wound Care was consulted for sacral wound.     Vascular following and managing right groin wound- managed via wound vac. Podiatry following and managing right foot and ankle wounds- cannot assess heel at this time due to dressing in place. Right foot in offloading boot.     Assessment Findings:   Left heel is dry, intact, and pink in color- tissue blanches. Recommend continuing with silicone bordered foam dressing to area.     POA to [facility] Mid Sacrum Unstageable Pressure Injury: irregular in shape area of full thickness skin loss. Wound bed is 90% yellow slough tissue and 10% pink tissue. Ani-wound is hyperpigmented. Scant serosanguineous drainage noted. Recommend triad paste and silicone bordered foam dressing.     No induration, fluctuance, odor, warmth/temperature differences, redness, or purulence noted to the above noted wounds and skin areas assessed. New dressings applied per orders listed below. Patient tolerated well- no s/s of non-verbal pain or discomfort observed during the encounter. Bedside nurse aware of plan of care. See flow sheets for more detailed assessment findings.      Orders listed below and wound care will continue to follow, call or tiger text with questions.     Skin Care Plan:  1-Right Groin Wound: per vascular recommendations    2-Turn/reposition q2h or when medically stable for pressure re-distribution on skin .  3-Elevate heels to offload pressure.  4-Moisturize skin daily with skin nourishing cream  5-Ehob cushion in chair when  out of bed.  6-RLE and Right Foot: per podiatry recommendations.   7-Left Heel: Apply Silicone Border Foam (Mepilex) to areas. Gibson with P for Prevention and change every 3 days or PRN soilage/displacement. Peel back and inspect Q-shift.  8-Mid Sacrum Wound: Cleanse with soap and water. Pat dry. Apply triad paste to mid sacrum wound and cover with a Silicone Bordered Foam Dressing. Gibson with T for Treatment and change every day or PRN soilage/displacement       Wounds:  Wound 02/23/24 Pressure Injury Coccyx Mid (Active)   Wound Image   02/26/24 0909   Wound Description Yellow;Slough;Pink 02/26/24 0909   Pressure Injury Stage U 02/26/24 0909   Ani-wound Assessment Hyperpigmented 02/26/24 0909   Wound Length (cm) 1 cm 02/26/24 0909   Wound Width (cm) 0.4 cm 02/26/24 0909   Wound Depth (cm) 0.2 cm 02/26/24 0909   Wound Surface Area (cm^2) 0.4 cm^2 02/26/24 0909   Wound Volume (cm^3) 0.08 cm^3 02/26/24 0909   Calculated Wound Volume (cm^3) 0.08 cm^3 02/26/24 0909   Drainage Amount Scant 02/26/24 0909   Drainage Description Serosanguineous 02/26/24 0909   Non-staged Wound Description Full thickness 02/26/24 0909   Treatments Cleansed;Irrigation with NSS 02/26/24 0909   Dressing Foam, Silicon (eg. Allevyn, etc);Other (Comment) 02/26/24 0909   Dressing Changed New 02/26/24 0909   Patient Tolerance Tolerated well 02/26/24 0909   Dressing Status Clean;Dry;Intact 02/26/24 0909               Sandra Vo RN, BSN, CWOCN

## 2024-02-26 NOTE — UTILIZATION REVIEW
NOTIFICATION OF ADMISSION DISCHARGE   This is a Notification of Discharge from [facility]. Please be advised that this patient has been discharge from our facility. Below you will find the admission and discharge date and time including the patient’s disposition.   UTILIZATION REVIEW CONTACT:  Mehdi Morrison  Utilization   Network Utilization Review Department  Phone: [phone] x carefully listen to the prompts. All voicemails are confidential.  Email: [email or web address]     ADMISSION INFORMATION  PRESENTATION DATE: 2/13/2024 11:30 PM  OBERVATION ADMISSION DATE:   INPATIENT ADMISSION DATE: 2/13/24 11:30 PM   DISCHARGE DATE: 2/23/2024  1:59 PM   DISPOSITION:[facility]    Network Utilization Review Department  ATTENTION: Please call with any questions or concerns to [phone] and carefully listen to the prompts so that you are directed to the right person. All voicemails are confidential.   For Discharge needs, contact Care Management DC Support Team at [phone] opt. 2  Send all requests for admission clinical reviews, approved or denied determinations and any other requests to dedicated fax number below belonging to the campus where the patient is receiving treatment. List of dedicated fax numbers for the Facilities:  FACILITY NAME UR FAX NUMBER   ADMISSION DENIALS (Administrative/Medical Necessity) [phone]   DISCHARGE SUPPORT TEAM ([facility]) [phone]   PARENT CHILD HEALTH (Maternity/NICU/Pediatrics) [phone]   [facility] [phone]   [facility] [phone]   [facility] [phone]   [facility] [phone]   [facility] [phone]   [facility] [phone]   [facility] [phone]   [facility] [phone]   [facility]  [facility] [phone]   Pending sale to [facility] [phone]   [facility] [phone]   [facility] [phone]

## 2024-02-26 NOTE — PLAN OF CARE
Problem: Prexisting or High Potential for Compromised Skin Integrity  Goal: Skin integrity is maintained or improved  Description: INTERVENTIONS:  - Identify patients at risk for skin breakdown  - Assess and monitor skin integrity  - Assess and monitor nutrition and hydration status  - Monitor labs   - Assess for incontinence   - Turn and reposition patient  - Assist with mobility/ambulation  - Relieve pressure over bony prominences  - Avoid friction and shearing  - Provide appropriate hygiene as needed including keeping skin clean and dry  - Evaluate need for skin moisturizer/barrier cream  - Collaborate with interdisciplinary team   - Patient/family teaching  - Consider wound care consult   Outcome: Progressing     Problem: MOBILITY - ADULT  Goal: Maintain or return to baseline ADL function  Description: INTERVENTIONS:  -  Assess patient's ability to carry out ADLs; assess patient's baseline for ADL function and identify physical deficits which impact ability to perform ADLs (bathing, care of mouth/teeth, toileting, grooming, dressing, etc.)  - Assess/evaluate cause of self-care deficits   - Assess range of motion  - Assess patient's mobility; develop plan if impaired  - Assess patient's need for assistive devices and provide as appropriate  - Encourage maximum independence but intervene and supervise when necessary  - Involve family in performance of ADLs  - Assess for home care needs following discharge   - Consider OT consult to assist with ADL evaluation and planning for discharge  - Provide patient education as appropriate  Outcome: Progressing  Goal: Maintains/Returns to pre admission functional level  Description: INTERVENTIONS:  - Perform AM-PAC 6 Click Basic Mobility/ Daily Activity assessment daily.  - Set and communicate daily mobility goal to care team and patient/family/caregiver.   - Collaborate with rehabilitation services on mobility goals if consulted  - Perform Range of Motion  times a day.  -  Reposition patient every  hours.  - Dangle patient  times a day  - Stand patient  times a day  - Ambulate patient  times a day  - Out of bed to chair  times a day   - Out of bed for meals  times a day  - Out of bed for toileting  - Record patient progress and toleration of activity level   Outcome: Progressing     Problem: PAIN - ADULT  Goal: Verbalizes/displays adequate comfort level or baseline comfort level  Description: Interventions:  - Encourage patient to monitor pain and request assistance  - Assess pain using appropriate pain scale  - Administer analgesics based on type and severity of pain and evaluate response  - Implement non-pharmacological measures as appropriate and evaluate response  - Consider cultural and social influences on pain and pain management  - Notify physician/advanced practitioner if interventions unsuccessful or patient reports new pain  Outcome: Progressing     Problem: INFECTION - ADULT  Goal: Absence or prevention of progression during hospitalization  Description: INTERVENTIONS:  - Assess and monitor for signs and symptoms of infection  - Monitor lab/diagnostic results  - Monitor all insertion sites, i.e. indwelling lines, tubes, and drains  - Monitor endotracheal if appropriate and nasal secretions for changes in amount and color  - [place] appropriate cooling/warming therapies per order  - Administer medications as ordered  - Instruct and encourage patient and family to use good hand hygiene technique  - Identify and instruct in appropriate isolation precautions for identified infection/condition  Outcome: Progressing  Goal: Absence of fever/infection during neutropenic period  Description: INTERVENTIONS:  - Monitor WBC    Outcome: Progressing     Problem: SAFETY ADULT  Goal: Patient will remain free of falls  Description: INTERVENTIONS:  - Educate patient/family on patient safety including physical limitations  - Instruct patient to call for assistance with activity   - Consult  OT/PT to assist with strengthening/mobility   - Keep Call bell within reach  - Keep bed low and locked with side rails adjusted as appropriate  - Keep care items and personal belongings within reach  - Initiate and maintain comfort rounds  - Make Fall Risk Sign visible to staff  - Offer Toileting every  Hours, in advance of need  - Initiate/Maintain alarm  - Obtain necessary fall risk management equipment:   - Apply yellow socks and bracelet for high fall risk patients  - Consider moving patient to room near nurses station  Outcome: Progressing  Goal: Maintain or return to baseline ADL function  Description: INTERVENTIONS:  -  Assess patient's ability to carry out ADLs; assess patient's baseline for ADL function and identify physical deficits which impact ability to perform ADLs (bathing, care of mouth/teeth, toileting, grooming, dressing, etc.)  - Assess/evaluate cause of self-care deficits   - Assess range of motion  - Assess patient's mobility; develop plan if impaired  - Assess patient's need for assistive devices and provide as appropriate  - Encourage maximum independence but intervene and supervise when necessary  - Involve family in performance of ADLs  - Assess for home care needs following discharge   - Consider OT consult to assist with ADL evaluation and planning for discharge  - Provide patient education as appropriate  Outcome: Progressing  Goal: Maintains/Returns to pre admission functional level  Description: INTERVENTIONS:  - Perform AM-PAC 6 Click Basic Mobility/ Daily Activity assessment daily.  - Set and communicate daily mobility goal to care team and patient/family/caregiver.   - Collaborate with rehabilitation services on mobility goals if consulted  - Perform Range of Motion  times a day.  - Reposition patient every  hours.  - Dangle patient  times a day  - Stand patient  times a day  - Ambulate patient  times a day  - Out of bed to chair  times a day   - Out of bed for meals  times a day  -  Out of bed for toileting  - Record patient progress and toleration of activity level   Outcome: Progressing     Problem: DISCHARGE PLANNING  Goal: Discharge to home or other facility with appropriate resources  Description: INTERVENTIONS:  - Identify barriers to discharge w/patient and caregiver  - Arrange for needed discharge resources and transportation as appropriate  - Identify discharge learning needs (meds, wound care, etc.)  - Arrange for interpretive services to assist at discharge as needed  - Refer to Case Management Department for coordinating discharge planning if the patient needs post-hospital services based on physician/advanced practitioner order or complex needs related to functional status, cognitive ability, or social support system  Outcome: Progressing     Problem: Nutrition/Hydration-ADULT  Goal: Nutrient/Hydration intake appropriate for improving, restoring or maintaining nutritional needs  Description: Monitor and assess patient's nutrition/hydration status for malnutrition. Collaborate with interdisciplinary team and initiate plan and interventions as ordered.  Monitor patient's weight and dietary intake as ordered or per policy. Utilize nutrition screening tool and intervene as necessary. Determine patient's food preferences and provide high-protein, high-caloric foods as appropriate.     INTERVENTIONS:  - Monitor oral intake, urinary output, labs, and treatment plans  - Assess nutrition and hydration status and recommend course of action  - Evaluate amount of meals eaten  - Assist patient with eating if necessary   - Allow adequate time for meals  - Recommend/ encourage appropriate diets, oral nutritional supplements, and vitamin/mineral supplements  - Order, calculate, and assess calorie counts as needed  - Recommend, monitor, and adjust tube feedings and TPN/PPN based on assessed needs  - Assess need for intravenous fluids  - Provide specific nutrition/hydration education as  appropriate  - Include patient/family/caregiver in decisions related to nutrition  Outcome: Progressing

## 2024-02-26 NOTE — CASE MANAGEMENT
H&P and initial therapy evals faxed via [place] to klaudia at [facility], [phone]. Awaiting determination.

## 2024-02-26 NOTE — PROGRESS NOTES
PM&R PROGRESS NOTE:  Lona Cedeno 78 y.o. female MRN: [id number]  Unit/Bed#: -01 Encounter: [id number]    Rehab Diagnosis: Impairment of mobility, safety and Activities of Daily Living (ADLs) due to Other Disabling Impairments:  13  Other Disabling Impairments     Etiologic: Diabetic right foot ulcer with osteomyelitis s/p fem bypass, right foot wound and achilles debridement with 2nd toe partial amputation and VAC placement   Date of Onset: 2/26/24     Date of surgery: 1/31, 2/4, 2/14    HPI: Lona Cedeno is a 78 y.o. female with history of anxiety, closed fracture of the coccyx back in May 2023, diabetes, GERD, hyperlipidemia, hypertension, IBS who presented to the [facility] on 1/23 from her podiatry office with a diabetic ulcer of the right toe and right ankle with exposed Achilles tendon.  She was found to have osteomyelitis of the right foot and transferred to [place] to consider revascularization and surgical options.  She was recommended to undergo extra-anatomic bypass to the right axillary to family artery and a right transtibial amputation was initially discussed but decision was made for attempted limb salvage.  She underwent the above bypass on 1/31/2024 as well as on 2/4/2024 and underwent right foot wound and Achilles debridement with second toe partial amputation and VAC placement.  Split thickness skin graft was discussed with podiatry and plastic surgery but deferred to a later time.  Her course was complicated by a right chest wall hematoma status post evacuation with vascular on 2/4.  On 2/13 the patient was discharged back to the acute care hospital due to fever and leukocytosis from a potential infection at the surgical site in her groin.  She underwent washout with VAC placement on 2/14 and 2/19 and remained on IV antibiotics.     SUBJECTIVE: Patient seen face to face.  No acute issues.  Progressing as expected in rehabilitation.  Dietary continues to  follow while here, no concrete plans for OR at this time.  Wound care as well as seeing and saw today for unstageable wound on the sacrum.  Additionally vascular surgery is also following.  Some reduced insight to her current situation had pulled on her groin wound VAC and will restart p.o. however suction is maintained.  Needs to maintain nonweightbearing status as recommended.  Otherwise she denies any fever, chills, nausea vomiting cough, shortness of breath, diarrhea or constipation.    ASSESSMENT: Stable, progressing, impulsive      PLAN:    Rehabilitation  Functional deficits: Self-care, mobility, cognition  Continue current rehabilitation plan of care to maximize function.    Functional update:   PT: Transfers min to mod assist, wheelchair mobility 160 feet x 2 min assist with wheelchair  OT: Total assistance for bathing, total assistance for oral hygiene, independent for eating, moderate assist for footwear and dressing total assist for toileting    Estimated Discharge: TBD in teams    DVT prophylaxis  On Xarelto     Pain  Acetaminophen 975 mg every 8 hours  Lyrica 200 mg 3 times daily  Robaxin 250 mg every 8 hours  Oxycodone 2.5-5 mg every 4 hours as needed     Bladder plan  Continent     Bowel plan  Continent  Last  2/24     Code Status  Level 3, DNAR/DNI         * Surgical site infection  Assessment & Plan  Needed on cefepime for surgical site infection however vancomycin was discontinued on 2/16  Right groin breakdown status post wound washout with VAC placement on 2/14 and 2/19  Was followed by infectious disease and continued on cefepime with plan to potentially transition to cefpodoxime 400 mg twice daily for 4 weeks  Cultures positive for Klebsiella and Proteus  Patient will need ongoing physical and occupational therapy 3 hours/day 5 to 7 days a week for ambulatory dysfunction and ADL deficits especially in the setting of multiple surgeries and wound vacs x 2  Ongoing rehab physician and nursing  oversight for management as well as training for transition back to the community    Impulsive  Assessment & Plan  Discussed plan with nursing as well as therapy  Patient on last admission was fairly impulsive and not using the call bell appropriately and getting up in the room not following weightbearing restrictions and using the commode.  Discussed this with the patient on evaluation today however need to be vigilant about nursing and therapy checks.  Alarms on relatively sensitive settings.  It would benefit from acute 2-hour checks for bowel and bladder    Sacral wound  Assessment & Plan  Unstageable wound on the sacrum on initial evaluations on return to the [facility] from acute care  Consult wound care for management, for now Allevyn, weight shifts and turns in bed    Other dietary vitamin B12 deficiency anemia  Assessment & Plan  Continue B12 supplementation    Sepsis without acute organ dysfunction (HCC)  Assessment & Plan  Patient with fever and leukocytosis while at the [facility] prior to send out and lactic acidosis but this resolved with IV fluids  There was concern for surgical site infection in the groin status post washout and VAC placement as listed above and surgical site infection  No growth with blood cultures however wound cultures positive for Proteus and Klebsiella  Urine culture growing greater than 100,000 CFU Enterobacter resistant to cefepime but thought to be not related to her symptoms and asymptomatic bacteriuria.  Need to consider following this further depending on her clinical course    Acute pain due to injury  Assessment & Plan  Acetaminophen 975 mg every 8 hours  Lyrica 200 mg 3 times daily  Robaxin 250 mg every 8 hours  Oxycodone 2.5-5 mg every 4 hours as needed    Impaired mobility and activities of daily living  Assessment & Plan  - Rehabilitation medicine physician for daily monitoring of care, 24 hour availability for acute  medical issues, medication management, and therapeutic and  diagnostic assessments.  - 24 hour rehabilitation nursing 7 days per week for: management/teaching of medications,  bowel/bladder routine, skin care.  - PT, OT for 2-3 hours per day, 5 to 6 days per week; 15 hours per week  - Rehabilitation Psychology for adjustment and coping  - MSW for barriers to discharge, community resources, and family support  - Discharge planning following to help ensure a safe and efficient discharge    Anemia  Assessment & Plan  He will most recently at 10.9 has been improving over the last week  Continue to monitor with biweekly labs or sooner if clinically indicated  Continue B12    Diabetic foot ulcer with osteomyelitis (HCC)  Assessment & Plan  Patient with right posterior ankle ulcer with exposed Achilles tendon s/p right foot debridement on 2/4/2024 with VAC, right medial ankle ulcer, right second toe osteomyelitis status post right second toe amputation  Patient now with wound VAC x 2, managed initially by podiatry for the foot and for surgery for the groin    Tobacco use disorder  Assessment & Plan  Encourage cessation, continue nicotine patch    PAD (peripheral artery disease) ([facility])  Assessment & Plan  1/31 BYPASS AXILLO-FEMORAL, RIGHT WITH 8MM RINGED PTFE GRAFT   Continue aspirin, statin and Xarelto 2.5 mg twice daily    GERD without esophagitis  Assessment & Plan  Continue protonix    Anxiety and depression  Assessment & Plan  Continue Wellbutrin  mg daily  Supportive counseling    Essential hypertension  Assessment & Plan  Management per internal medicine however currently off of antihypertensives    Type 2 diabetes mellitus with diabetic polyneuropathy ([facility])  Assessment & Plan  Lab Results   Component Value Date    HGBA1C 6.5 (H) 11/07/2023       Recent Labs     02/25/24  2043 02/26/24  0630 02/26/24  1101 02/26/24  1553   POCGLU 129 141* 203* 99       Had been on metformin as an outpatient but held while inpatient currently managed on a sliding scale insulin  regimen  Monitor per internal medicine and make adjustments as needed  (P) [id number]            Appreciate IM consultants medical co-management.  Labs, medications, and imaging personally reviewed.      ROS:  A ten point review of systems was completed on 02/26/24 and pertinent positives are listed in subjective section. All other systems reviewed were negative.     OBJECTIVE:   /57 (BP Location: Right arm)   Pulse 75   Temp 97.8 °F (36.6 °C) (Oral)   Resp 17   Wt 69.4 kg (153 lb)   SpO2 95%   BMI 28.91 kg/m²     Physical Exam  Vitals reviewed.   Constitutional:       General: She is not in acute distress.  HENT:      Head: Normocephalic.      Right Ear: External ear normal.      Left Ear: External ear normal.      Nose: Nose normal. No rhinorrhea.      Mouth/Throat:      Mouth: Mucous membranes are moist.      Pharynx: Oropharynx is clear.   Eyes:      General: No scleral icterus.  Cardiovascular:      Rate and Rhythm: Normal rate.   Pulmonary:      Effort: Pulmonary effort is normal. No respiratory distress.   Abdominal:      General: There is no distension.      Palpations: Abdomen is soft.   Musculoskeletal:      Cervical back: Normal range of motion.      Comments: Wound VAC in the foot wound VAC in the groin, images reviewed but sites not directly observed today   Skin:     General: Skin is warm and dry.   Neurological:      General: No focal deficit present.      Mental Status: She is alert and oriented to person, place, and time.      Comments: Generalized nonneurologic weakness in the bilateral lower extremities more proximal and distal   Psychiatric:         Mood and Affect: Mood normal.         Behavior: Behavior normal.          Lab Results   Component Value Date    WBC 7.99 02/23/2024    HGB 10.9 (L) 02/23/2024    HCT 35.3 02/23/2024     (H) 02/23/2024     02/23/2024     Lab Results   Component Value Date    SODIUM 137 02/23/2024    K 3.9 02/23/2024      02/23/2024    CO2 30 02/23/2024    BUN 17 02/23/2024    CREATININE 0.59 (L) 02/23/2024    GLUC 160 (H) 02/23/2024    CALCIUM 9.4 02/23/2024     Lab Results   Component Value Date    INR 1.16 02/14/2024    INR 1.03 02/04/2024    INR 1.11 02/03/2024    PROTIME 14.7 (H) 02/14/2024    PROTIME 13.4 02/04/2024    PROTIME 14.2 02/03/2024           Current Facility-Administered Medications:     acetaminophen (TYLENOL) tablet 975 mg, 975 mg, Oral, Q8H JILLIAN, ROXANE Gaona, 975 mg at 02/26/24 1430    albuterol (PROVENTIL HFA,VENTOLIN HFA) inhaler 2 puff, 2 puff, Inhalation, Q6H PRN, ROXANE Gaona    aspirin (ECOTRIN LOW STRENGTH) EC tablet 81 mg, 81 mg, Oral, Daily, ROXANE Gaona, 81 mg at 02/26/24 0945    atorvastatin (LIPITOR) tablet 10 mg, 10 mg, Oral, After Dinner, ROXANE Gaona, 10 mg at 02/25/24 1705    bisacodyl (DULCOLAX) EC tablet 5 mg, 5 mg, Oral, Daily PRN, ROXANE Gaona    buPROPion (WELLBUTRIN XL) 24 hr tablet 300 mg, 300 mg, Oral, Daily, ROXANE Gaona, 300 mg at 02/26/24 0945    cefpodoxime (VANTIN) tablet 400 mg, 400 mg, Oral, BID With Meals, ROXANE Gaona, 400 mg at 02/26/24 0945    clonazePAM (KlonoPIN) tablet 1 mg, 1 mg, Oral, QPM, ROXANE Gaona, 1 mg at 02/25/24 2108    cyanocobalamin (VITAMIN B-12) tablet 1,000 mcg, 1,000 mcg, Oral, Daily, ROXANE Gaona, 1,000 mcg at 02/26/24 0945    fish oil capsule 1,000 mg, 1,000 mg, Oral, Daily, ROXANE Gaona, 1,000 mg at 02/26/24 0945    fluticasone-vilanterol 200-25 mcg/actuation 1 puff, 1 puff, Inhalation, Daily, ROXANE Gaona, 1 puff at 02/26/24 0953    insulin lispro (HumaLOG) 100 units/mL subcutaneous injection 1-5 Units, 1-5 Units, Subcutaneous, TID AC, 1 Units at 02/26/24 1205 **AND** Fingerstick Glucose (POCT), , , TID AC, ROXANE Gaona    insulin lispro (HumaLOG) 100 units/mL subcutaneous  injection 1-5 Units, 1-5 Units, Subcutaneous, HS, ROXANE Gaona    metFORMIN (GLUCOPHAGE) tablet 500 mg, 500 mg, Oral, BID With Meals, ROXANE Gaona, 500 mg at 02/26/24 0945    methocarbamol (ROBAXIN) tablet 250 mg, 250 mg, Oral, Q8H JILLIAN, ROXANE Gaona, 250 mg at 02/26/24 1430    multivitamin-minerals (CENTRUM) tablet 1 tablet, 1 tablet, Oral, Daily, ROXANE Gaona, 1 tablet at 02/26/24 0945    nicotine (NICODERM CQ) 14 mg/24hr TD 24 hr patch 1 patch, 1 patch, Transdermal, Daily, ROXANE Gaona, 1 patch at 02/26/24 0952    ondansetron (ZOFRAN-ODT) dispersible tablet 4 mg, 4 mg, Oral, Q6H PRN, ROXANE Gaona    oxybutynin (DITROPAN-XL) 24 hr tablet 5 mg, 5 mg, Oral, Daily, ROXANE Gaona, 5 mg at 02/26/24 0945    oxyCODONE (ROXICODONE) split tablet 2.5 mg, 2.5 mg, Oral, Q4H PRN, 2.5 mg at 02/24/24 1708 **OR** oxyCODONE (ROXICODONE) IR tablet 5 mg, 5 mg, Oral, Q4H PRN, ROXANE Gaona    pantoprazole (PROTONIX) EC tablet 40 mg, 40 mg, Oral, Early Morning, ROXANE Gaona, 40 mg at 02/26/24 0557    polyethylene glycol (MIRALAX) packet 17 g, 17 g, Oral, Daily, ROXANE Gaona, 17 g at 02/26/24 0946    pregabalin (LYRICA) capsule 200 mg, 200 mg, Oral, TID, ROXANE Gaona, 200 mg at 02/26/24 0946    rivaroxaban (XARELTO) tablet 2.5 mg, 2.5 mg, Oral, BID With Meals, ROXANE Gaona, 2.5 mg at 02/26/24 0945    senna-docusate sodium (SENOKOT S) 8.6-50 mg per tablet 1 tablet, 1 tablet, Oral, BID, ROXANE Gaona, 1 tablet at 02/26/24 0945    Past Medical History:   Diagnosis Date    Anxiety     Closed fracture of coccyx (HCC) 05/24/2023    Diabetes mellitus (HCC)     GERD (gastroesophageal reflux disease)     Hyperlipidemia     Hypertension     IBS (irritable bowel syndrome)     Shoulder fracture        Patient Active Problem List    Diagnosis Date Noted     Surgical site infection 02/13/2024    Sacral wound 02/23/2024    Impulsive 02/23/2024    Other dietary vitamin B12 deficiency anemia 02/16/2024    Leukocytosis 02/13/2024    Sepsis without acute organ dysfunction (HCC) 02/13/2024    At risk for venous thromboembolism (VTE) 02/09/2024    At high risk for skin breakdown 02/09/2024    Wound of skin 02/09/2024    Impaired mobility and activities of daily living 02/09/2024    Acute pain due to injury 02/09/2024    Hematoma 02/06/2024    Anemia 02/06/2024    Constipation 01/26/2024    Preoperative cardiovascular examination 01/25/2024    Diabetic foot ulcer with osteomyelitis (HCC) 01/23/2024    Ankle ulcer, right, with fat layer exposed ([facility]) 12/14/2023    Age-related osteoporosis without current pathological fracture 11/28/2023    Abnormal CT of the chest 10/17/2023    COPD, severity to be determined ([facility]) 10/17/2023    Tobacco use disorder 10/17/2023    PAD (peripheral artery disease) ([facility]) 10/10/2023    Bladder neoplasm 09/11/2023    Left renal mass 09/11/2023    Lactic acidosis 08/11/2023    Hypomagnesemia 08/11/2023    Degenerative lumbar disc 05/24/2023    Urinary incontinence 04/13/2023    GERD without esophagitis 10/16/2019    Essential hypertension 02/15/2019    Anxiety and depression 02/15/2019    Type 2 diabetes mellitus with diabetic polyneuropathy (HCC) 02/12/2019          Rigo Hoover, DO  Physical Medicine and Rehabilitation  [facility]    I have spent a total time of 35 minutes on 02/26/24 in caring for this patient including Counseling / Coordination of care, Documenting in the medical record, and Communicating with other healthcare professionals .      ** Please Note:  voice to text software may have been used in the creation of this document. Although proof errors in transcription or interpretation are a potential of such software**

## 2024-02-26 NOTE — CASE MANAGEMENT
Met w/pt and reviewed rehab routine and cm role. Pt recently on ARC and needed medical treatment at acute hospital level. Pt plans on returning home where she lives with her spouse an daughter Arlene. Neither of them drive. Pt has a ranch home with 3-5 grace and states her brother in law and spouse are headed to buy lumber today to build a ramp. Pt currently with two vacs and is aware she may be going home with both of them. Cm reviewed the need for [facility] services and she is still in agreement with [facility]. Pt aware she will need a w/c and cm explained if a walker or w/c have not been purchased under insurance in the last 5 years, we will submit under insurance for one. Pt was going to call her brother in law and have him investigate getting one. Cm reviewed team mtg process, potential los, w/c goals for dc and the insurance update conducted today.

## 2024-02-26 NOTE — PROGRESS NOTES
"   02/26/24 1000   Pain Assessment   Pain Assessment Tool 0-10   Pain Score 5   Pain Location/Orientation Orientation: Right;Location: Shoulder   Pain Onset/Description Onset: Gradual;Frequency: Intermittent   Patient's Stated Pain Goal No pain   Hospital Pain Intervention(s) Rest   Restrictions/Precautions   Precautions Bed/chair alarms;Cognitive;Fall Risk;Hard of hearing;Pain;Supervision on toilet/commode   RLE Weight Bearing Per Order NWB   ROM Restrictions No   Braces or Orthoses   (R prevalon boot, R wound vac x2)   Cognition   Overall Cognitive Status Impaired   Arousal/Participation Alert;Responsive;Cooperative   Attention Attends with cues to redirect   Orientation Level Oriented X4   Memory Decreased recall of precautions   Following Commands Follows one step commands with increased time or repetition   Subjective   Subjective \"I want to go home\".   Lying to Sitting on Side of Bed   Type of Assistance Needed Supervision   Lying to Sitting on Side of Bed CARE Score 4   Sit to Stand   Type of Assistance Needed Physical assistance;Verbal cues;Adaptive equipment   Physical Assistance Level 26%-50%   Comment ANGELICA with RW   Sit to Stand CARE Score 3   Bed-Chair Transfer   Type of Assistance Needed Physical assistance;Verbal cues;Adaptive equipment   Physical Assistance Level 26%-50%   Comment with SPT and RW MIN/MODA, sit pivots ANGELICA.   Chair/Bed-to-Chair Transfer CARE Score 3   Walk 10 Feet   Reason if not Attempted Safety concerns   Walk 10 Feet CARE Score 88   Walk 50 Feet with Two Turns   Reason if not Attempted Safety concerns   Walk 50 Feet with Two Turns CARE Score 88   Walk 150 Feet   Reason if not Attempted Safety concerns   Walk 150 Feet CARE Score 88   Walking 10 Feet on Uneven Surfaces   Reason if not Attempted Safety concerns   Walking 10 Feet on Uneven Surfaces CARE Score 88   Ambulation   Primary Mode of Locomotion Prior to Admission Walk   Wheel 50 Feet with Two Turns   Type of Assistance Needed " Physical assistance;Verbal cues   Physical Assistance Level 25% or less   Comment varied CS to ANGELICA   Wheel 50 Feet with Two Turns CARE Score 3   Wheel 150 Feet   Type of Assistance Needed Physical assistance;Verbal cues   Physical Assistance Level 25% or less   Comment varied CS to ANGELICA   Wheel 150 Feet CARE Score 3   Wheelchair mobility   Does the patient use a wheelchair? 1. Yes   Type of Wheelchair Used 1. Manual   Method Right upper extremity;Left upper extremity   Assistance Provided For Locking Brakes;Obstacles;Remove Leg Rest;Replace Leg Rest;Remove armrests;Replace armrests   Distance Level Surface (feet) 160 ft  (x2)   Curb or Single Stair   Reason if not Attempted Safety concerns   1 Step (Curb) CARE Score 88   4 Steps   Reason if not Attempted Safety concerns   4 Steps CARE Score 88   12 Steps   Reason if not Attempted Safety concerns   12 Steps CARE Score 88   Stairs   Findings Per pt ramp is currently being built.   Picking Up Object   Reason if not Attempted Safety concerns   Picking Up Object CARE Score 88   Therapeutic Interventions   Strengthening Seated LAQ, hip flex BLE 3 x10 reps, LLE with 3#. Standing with RW RLE LAQ and HIP flex x30 reps.   Balance repeated sit pivots and x1 SPT. Pt unsafe with SPT and attempts to WB through RLE. She can maintain NWB to RLE with sit pivots at this time.   Other Pt stood at RW with ANGELICA x1 min then x1 min 9 sec.   Equipment Use   NuStep L2 x10 min RLE on stool resting.   Assessment   Treatment Assessment Pt participated in skilled PT session with increased focus on functional transfers, strengthening and WC management. Pt had difficulty answering some questions appropriately 2/2 poor hearing and increased background noise. Pt educated on WC level at discharge and what that meant. Pt had a difficult time carrying over break usage and removal of arm rest with sit pivots. Pt will require increased education on WC mechanics and set-up of WC when performing sit  pivots as pt parked directly in front of seat when asked. Pt's largest barriers are the 2 wound vacs, NWB to RLE, poor carryover and decreased care giver support. Pt will cont POC as tolerated with cont focus on increased I with functional transfers, increased safety awareness and I with WC mobility/mechanics.   Problem List Decreased strength;Decreased endurance;Decreased mobility;Pain   Barriers to Discharge Decreased caregiver support;Inaccessible home environment   PT Barriers   Functional Limitation Car transfers;Ramp negotiation;Stair negotiation;Standing;Transfers;Walking;Wheelchair management   Plan   Treatment/Interventions Functional transfer training;LE strengthening/ROM;Therapeutic exercise;Endurance training;Cognitive reorientation;Patient/family training;Bed mobility;Gait training   Progress Progressing toward goals   Discharge Recommendation   Equipment Recommended Wheelchair   PT Therapy Minutes   PT Time In 1000   PT Time Out 1130   PT Total Time (minutes) 90   PT Mode of treatment - Individual (minutes) 90   PT Mode of treatment - Concurrent (minutes) 0   PT Mode of treatment - Group (minutes) 0   PT Mode of treatment - Co-treat (minutes) 0   PT Mode of Treatment - Total time(minutes) 90 minutes   PT Cumulative Minutes 180   Therapy Time missed   Time missed? No

## 2024-02-26 NOTE — PROGRESS NOTES
02/26/24 1300   Pain Assessment   Pain Assessment Tool 0-10   Pain Score No Pain   Restrictions/Precautions   Precautions Bed/chair alarms;Cognitive;Fall Risk;Hard of hearing;Impulsive;Contact/isolation;Multiple lines;Pressure Ulcer;Supervision on toilet/commode   Weight Bearing Restrictions Yes   RLE Weight Bearing Per Order NWB   ROM Restrictions No   Braces or Orthoses   (R prevalon boot, R wound vac x2)   Sit to Lying   Type of Assistance Needed Supervision   Physical Assistance Level No physical assistance   Comment HOB flat, no rails, pt requested to return to bed at end of OT session 2* max fatigue   Sit to Lying CARE Score 4   Sit to Stand   Type of Assistance Needed Physical assistance;Verbal cues;Adaptive equipment   Physical Assistance Level Total assistance   Comment Pt mainly Morris w/RW and max vc's for RLE NWB, but did require TA during one instance of posterior LOB during static standing activity at tabletop in OT gym, to prevent pt from falling   Sit to Stand CARE Score 1   Bed-Chair Transfer   Type of Assistance Needed Physical assistance;Verbal cues   Physical Assistance Level 26%-50%   Comment Morris sit-pivot transfer to L, A for wound vac and vac line mgmt   Chair/Bed-to-Chair Transfer CARE Score 3   Functional Standing Tolerance   Time 2-3mins x 5   Activity card matching   Comments Pt engaged in card matching activity while in stance at raised tabletop using unilateral UE release for fxl reaching, for increased standing balance/tolerance and improved carryover of RLE NWB. Pt required Max vc's to ensure RLE NWB. Pt mostly required Morris for standing balance but did experience 1 posterior LOB requiring TA to prevent pt fall. This occurred when pt attempted to manage cards with both hands with BUE release from tabletop, with pt then required repetitive education on safety awareness, hand placement, and unilateral UE release currently being the safest method. Pt receptive. Pt required several  seated rest breaks to manage LE fatigue.   Exercise Tools   Exercise Tools Yes   UE Ergometer Seated in w/c with Sup, tabletop UEB bilaterally for 5min prograde, 5min retrograde against min resistance for BUE strengthening and endurance training, for increased independence during fxl transfers. Pt tolerated well with rest break x1 to manage overall fatigue.   Other Exercise Tool 1 Seated in w/c with Sup, [id number] fwd and clockwise BUE tabletop towel glides for increased BUE ROM and UE stretching as prep for fxl reaching activity. Pt tolerated well with frequent rest breaks to manage generalized fatigue.   Cognition   Overall Cognitive Status Impaired   Arousal/Participation Alert;Cooperative   Attention Attends with cues to redirect   Orientation Level Oriented X4   Memory Decreased recall of precautions;Decreased short term memory   Following Commands Follows one step commands with increased time or repetition   Activity Tolerance   Activity Tolerance Patient limited by fatigue   Assessment   Treatment Assessment Pt seen for 90min skilled OT session focused on fxl sit-pivot transfers, bed mobility, UE strengthening/ROM, endurance training, static standing balance/tolerance with unilateral UE release, maintaining RLE NWB, and activity tolerance for increased independence w/ADLs/IADLs and decreased caregiver burden. See detailed descriptions of fxl performance above. Pt tolerated session but required max vc's for encouragement and engagement initially to participate in OT session, as pt stated she felt max fatigue and requested to return to bed upon therapist arrival to pt room. With encouragement and education on ECT/rest breaks, pt willing to participate in OT session. Pt cont to require max vc's to maintain RLE NWB status during standing activities. Pt cont to be limited by decreased fxl cognition, standing balance/tolerance, [place], impulsive, multiple line management, endurance, strength, trunk control, and act  shavon. Pt would benefit from continued skilled OT focused on ADL/IADL retraining, LHAE education/training, Kitchen Mobilty, Meal preparation, Medication management , Functional Transfers, Functional Cognition, Functional Attention, MOCA, Standing tolerance, Standing balance , DME training/education, Family training/education, Energy conservation training/education.   Problem List Decreased strength;Decreased range of motion;Decreased endurance;Impaired balance;Decreased mobility;Decreased coordination;Decreased cognition;Impaired judgement;Decreased safety awareness;Impaired hearing;Orthopedic restrictions;Decreased skin integrity   Plan   Treatment/Interventions ADL retraining;Functional transfer training;Therapeutic exercise;Endurance training;Cognitive reorientation;Patient/family training;Equipment eval/education;Bed mobility;Compensatory technique education   Progress Progressing toward goals   Discharge Recommendation   Rehab Resource Intensity Level, OT   (pending)   OT Therapy Minutes   OT Time In 1300   OT Time Out 1430   OT Total Time (minutes) 90   OT Mode of treatment - Individual (minutes) 90   OT Mode of treatment - Concurrent (minutes) 0   OT Mode of treatment - Group (minutes) 0   OT Mode of treatment - Co-treat (minutes) 0   OT Mode of Treatment - Total time(minutes) 90 minutes   OT Cumulative Minutes 180   Therapy Time missed   Time missed? No

## 2024-02-26 NOTE — QUICK NOTE
Acute Care Surgery  Bedside V.A.C. Procedure Note    Location of wound: Right groin    Dressings and Foam removed:  0  Dressings  2 Black Foam  0 White Foam    Dimensions of wound: 5 cm x 1 cm x 1 cm    Description of wound: On inspection of the wound today, looked clean with granulation tissue, see image below. There was no necrotic or nonviable tissue requiring debridement. The wound base is now pink and healthy and there is granulation tissue. The periwound skin remains clean and intact and unremarkable.    VAC dressing application:  The periwound skin was cleaned and dried. 0 dressings, 2 black foam, 0 white foam were cut to size of the wound and placed into the wound. The dressings were then covered with VAC drape. Additional VAC drape and black foam was used to create a bridge to the patient's right lower abdomen  and a base for the track pad. The track pad was then placed over the base of black foam. The VAC was then set to 125 mmHg low Continuous suction. The patient tolerated the procedure well and there were no complications. The patient did not require any excisional debridement during today's dressing change.    VAC settings:  125 mmHg  Continuous    Wound Images:      Additional Notes:  The VAC sticker placed over the dressing per protocol.  The next VAC dressing change will be planned for 2/28/24.    This dressing change took greater than 15 minutes to complete.    Himanshu Overton MD  2/26/2024 6:49 AM

## 2024-02-27 ENCOUNTER — HOSPITAL ENCOUNTER (INPATIENT)
Facility: HOSPITAL | Age: 79
DRG: 623 | End: 2024-02-27
Attending: FAMILY MEDICINE | Admitting: INTERNAL MEDICINE
Payer: COMMERCIAL

## 2024-02-27 VITALS
OXYGEN SATURATION: 94 % | SYSTOLIC BLOOD PRESSURE: 117 MMHG | HEART RATE: 74 BPM | HEIGHT: 61 IN | TEMPERATURE: 98.1 F | WEIGHT: 153 LBS | RESPIRATION RATE: 18 BRPM | DIASTOLIC BLOOD PRESSURE: 56 MMHG | BODY MASS INDEX: 28.89 KG/M2

## 2024-02-27 DIAGNOSIS — E11.42 TYPE 2 DIABETES MELLITUS WITH DIABETIC POLYNEUROPATHY, WITHOUT LONG-TERM CURRENT USE OF INSULIN (HCC): Primary | Chronic | ICD-10-CM

## 2024-02-27 LAB
GLUCOSE SERPL-MCNC: 113 MG/DL (ref 65–140)
GLUCOSE SERPL-MCNC: 118 MG/DL (ref 65–140)
GLUCOSE SERPL-MCNC: 185 MG/DL (ref 65–140)

## 2024-02-27 PROCEDURE — 97110 THERAPEUTIC EXERCISES: CPT

## 2024-02-27 PROCEDURE — 82948 REAGENT STRIP/BLOOD GLUCOSE: CPT

## 2024-02-27 PROCEDURE — 99232 SBSQ HOSP IP/OBS MODERATE 35: CPT | Performed by: STUDENT IN AN ORGANIZED HEALTH CARE EDUCATION/TRAINING PROGRAM

## 2024-02-27 PROCEDURE — 97530 THERAPEUTIC ACTIVITIES: CPT

## 2024-02-27 PROCEDURE — 97535 SELF CARE MNGMENT TRAINING: CPT

## 2024-02-27 PROCEDURE — 99223 1ST HOSP IP/OBS HIGH 75: CPT | Performed by: INTERNAL MEDICINE

## 2024-02-27 PROCEDURE — 99238 HOSP IP/OBS DSCHRG MGMT 30/<: CPT | Performed by: INTERNAL MEDICINE

## 2024-02-27 PROCEDURE — NC001 PR NO CHARGE: Performed by: INTERNAL MEDICINE

## 2024-02-27 RX ORDER — NICOTINE 21 MG/24HR
1 PATCH, TRANSDERMAL 24 HOURS TRANSDERMAL DAILY
Status: DISPENSED | OUTPATIENT
Start: 2024-02-28

## 2024-02-27 RX ORDER — INSULIN LISPRO 100 [IU]/ML
1-5 INJECTION, SOLUTION INTRAVENOUS; SUBCUTANEOUS
Status: DISPENSED | OUTPATIENT
Start: 2024-02-27

## 2024-02-27 RX ORDER — BUPROPION HYDROCHLORIDE 150 MG/1
300 TABLET ORAL DAILY
Status: DISPENSED | OUTPATIENT
Start: 2024-02-28

## 2024-02-27 RX ORDER — INSULIN LISPRO 100 [IU]/ML
1-5 INJECTION, SOLUTION INTRAVENOUS; SUBCUTANEOUS
Start: 2024-02-27

## 2024-02-27 RX ORDER — ACETAMINOPHEN 325 MG/1
975 TABLET ORAL EVERY 8 HOURS SCHEDULED
Status: DISPENSED | OUTPATIENT
Start: 2024-02-27

## 2024-02-27 RX ORDER — HEPARIN SODIUM 5000 [USP'U]/ML
5000 INJECTION, SOLUTION INTRAVENOUS; SUBCUTANEOUS EVERY 8 HOURS SCHEDULED
Status: DISCONTINUED | OUTPATIENT
Start: 2024-02-27 | End: 2024-02-28

## 2024-02-27 RX ORDER — PANTOPRAZOLE SODIUM 40 MG/1
40 TABLET, DELAYED RELEASE ORAL
Start: 2024-02-28

## 2024-02-27 RX ORDER — NICOTINE 21 MG/24HR
1 PATCH, TRANSDERMAL 24 HOURS TRANSDERMAL DAILY
Qty: 28 PATCH | Refills: 0 | OUTPATIENT
Start: 2024-02-28

## 2024-02-27 RX ORDER — PREGABALIN 100 MG/1
200 CAPSULE ORAL 3 TIMES DAILY
Status: DISPENSED | OUTPATIENT
Start: 2024-02-27

## 2024-02-27 RX ORDER — POLYETHYLENE GLYCOL 3350 17 G/17G
17 POWDER, FOR SOLUTION ORAL DAILY
Status: DISPENSED | OUTPATIENT
Start: 2024-02-28

## 2024-02-27 RX ORDER — PREGABALIN 200 MG/1
200 CAPSULE ORAL 3 TIMES DAILY
Qty: 30 CAPSULE | Refills: 0 | OUTPATIENT
Start: 2024-02-27 | End: 2024-03-08

## 2024-02-27 RX ORDER — OXYCODONE HYDROCHLORIDE 5 MG/1
2.5 TABLET ORAL EVERY 4 HOURS PRN
Start: 2024-02-27 | End: 2024-03-08

## 2024-02-27 RX ORDER — CLONAZEPAM 1 MG/1
1 TABLET ORAL EVERY EVENING
Status: DISPENSED | OUTPATIENT
Start: 2024-02-27

## 2024-02-27 RX ORDER — ONDANSETRON 4 MG/1
4 TABLET, ORALLY DISINTEGRATING ORAL EVERY 6 HOURS PRN
Start: 2024-02-27

## 2024-02-27 RX ORDER — CHLORAL HYDRATE 500 MG
1000 CAPSULE ORAL DAILY
Start: 2024-02-28

## 2024-02-27 RX ORDER — METHOCARBAMOL 500 MG/1
250 TABLET, FILM COATED ORAL EVERY 8 HOURS SCHEDULED
Status: DISPENSED | OUTPATIENT
Start: 2024-02-27

## 2024-02-27 RX ORDER — OXYCODONE HYDROCHLORIDE 5 MG/1
5 TABLET ORAL EVERY 4 HOURS PRN
Start: 2024-02-27 | End: 2024-03-08

## 2024-02-27 RX ORDER — FLUTICASONE FUROATE AND VILANTEROL 200; 25 UG/1; UG/1
1 POWDER RESPIRATORY (INHALATION) DAILY
Status: DISPENSED | OUTPATIENT
Start: 2024-02-28

## 2024-02-27 RX ORDER — OXYBUTYNIN CHLORIDE 5 MG/1
5 TABLET, EXTENDED RELEASE ORAL DAILY
Start: 2024-02-28

## 2024-02-27 RX ORDER — PANTOPRAZOLE SODIUM 40 MG/1
40 TABLET, DELAYED RELEASE ORAL
Status: DISPENSED | OUTPATIENT
Start: 2024-02-28

## 2024-02-27 RX ORDER — CHLORAL HYDRATE 500 MG
1000 CAPSULE ORAL DAILY
Status: DISPENSED | OUTPATIENT
Start: 2024-02-28

## 2024-02-27 RX ORDER — OXYCODONE HYDROCHLORIDE 5 MG/1
5 TABLET ORAL EVERY 4 HOURS PRN
Status: DISPENSED | OUTPATIENT
Start: 2024-02-27

## 2024-02-27 RX ORDER — CEFPODOXIME PROXETIL 200 MG/1
400 TABLET, FILM COATED ORAL 2 TIMES DAILY WITH MEALS
Status: DISPENSED | OUTPATIENT
Start: 2024-02-27 | End: 2024-03-22

## 2024-02-27 RX ORDER — AMOXICILLIN 250 MG
1 CAPSULE ORAL 2 TIMES DAILY
Status: DISPENSED | OUTPATIENT
Start: 2024-02-27

## 2024-02-27 RX ORDER — FLUTICASONE FUROATE AND VILANTEROL 200; 25 UG/1; UG/1
1 POWDER RESPIRATORY (INHALATION)
Qty: 60 BLISTER | Refills: 0 | OUTPATIENT
Start: 2024-02-28

## 2024-02-27 RX ORDER — ALBUTEROL SULFATE 90 UG/1
2 AEROSOL, METERED RESPIRATORY (INHALATION) EVERY 6 HOURS PRN
Status: DISPENSED | OUTPATIENT
Start: 2024-02-27

## 2024-02-27 RX ORDER — ASPIRIN 81 MG/1
81 TABLET, CHEWABLE ORAL DAILY
Status: DISPENSED | OUTPATIENT
Start: 2024-02-28

## 2024-02-27 RX ADMIN — INSULIN LISPRO 1 UNITS: 100 INJECTION, SOLUTION INTRAVENOUS; SUBCUTANEOUS at 21:19

## 2024-02-27 RX ADMIN — OXYBUTYNIN CHLORIDE 5 MG: 5 TABLET, EXTENDED RELEASE ORAL at 08:17

## 2024-02-27 RX ADMIN — CEFPODOXIME PROXETIL 400 MG: 200 TABLET, FILM COATED ORAL at 08:19

## 2024-02-27 RX ADMIN — METHOCARBAMOL 250 MG: 500 TABLET ORAL at 18:07

## 2024-02-27 RX ADMIN — ACETAMINOPHEN 975 MG: 325 TABLET, FILM COATED ORAL at 18:07

## 2024-02-27 RX ADMIN — ACETAMINOPHEN 975 MG: 325 TABLET, FILM COATED ORAL at 21:16

## 2024-02-27 RX ADMIN — Medication 1 TABLET: at 08:20

## 2024-02-27 RX ADMIN — CEFPODOXIME PROXETIL 400 MG: 200 TABLET, FILM COATED ORAL at 20:00

## 2024-02-27 RX ADMIN — SENNOSIDES, DOCUSATE SODIUM 1 TABLET: 8.6; 5 TABLET ORAL at 18:08

## 2024-02-27 RX ADMIN — HEPARIN SODIUM 5000 UNITS: 5000 INJECTION INTRAVENOUS; SUBCUTANEOUS at 18:08

## 2024-02-27 RX ADMIN — ASPIRIN 81 MG: 81 TABLET, COATED ORAL at 08:17

## 2024-02-27 RX ADMIN — ACETAMINOPHEN 975 MG: 325 TABLET, FILM COATED ORAL at 06:16

## 2024-02-27 RX ADMIN — PANTOPRAZOLE SODIUM 40 MG: 40 TABLET, DELAYED RELEASE ORAL at 06:16

## 2024-02-27 RX ADMIN — METHOCARBAMOL 250 MG: 500 TABLET ORAL at 13:44

## 2024-02-27 RX ADMIN — RIVAROXABAN 2.5 MG: 2.5 TABLET, FILM COATED ORAL at 08:22

## 2024-02-27 RX ADMIN — CLONAZEPAM 1 MG: 1 TABLET ORAL at 18:08

## 2024-02-27 RX ADMIN — OMEGA-3 FATTY ACIDS CAP 1000 MG 1000 MG: 1000 CAP at 08:20

## 2024-02-27 RX ADMIN — POLYETHYLENE GLYCOL 3350 17 G: 17 POWDER, FOR SOLUTION ORAL at 08:21

## 2024-02-27 RX ADMIN — CYANOCOBALAMIN TAB 500 MCG 1000 MCG: 500 TAB at 08:19

## 2024-02-27 RX ADMIN — BUPROPION HYDROCHLORIDE 300 MG: 150 TABLET, EXTENDED RELEASE ORAL at 08:18

## 2024-02-27 RX ADMIN — SENNOSIDES, DOCUSATE SODIUM 1 TABLET: 8.6; 5 TABLET ORAL at 08:22

## 2024-02-27 RX ADMIN — PREGABALIN 200 MG: 100 CAPSULE ORAL at 08:26

## 2024-02-27 RX ADMIN — ACETAMINOPHEN 975 MG: 325 TABLET, FILM COATED ORAL at 13:44

## 2024-02-27 RX ADMIN — NICOTINE 1 PATCH: 14 PATCH, EXTENDED RELEASE TRANSDERMAL at 08:31

## 2024-02-27 RX ADMIN — PREGABALIN 200 MG: 100 CAPSULE ORAL at 21:17

## 2024-02-27 RX ADMIN — METHOCARBAMOL 250 MG: 500 TABLET ORAL at 06:16

## 2024-02-27 RX ADMIN — FLUTICASONE FUROATE AND VILANTEROL TRIFENATATE 1 PUFF: 200; 25 POWDER RESPIRATORY (INHALATION) at 08:20

## 2024-02-27 RX ADMIN — METFORMIN HYDROCHLORIDE 500 MG: 500 TABLET, FILM COATED ORAL at 07:39

## 2024-02-27 NOTE — PROGRESS NOTES
PM&R PROGRESS NOTE:  Lona Cedeno 78 y.o. female MRN: [id number]  Unit/Bed#: -01 Encounter: [id number]    Rehab Diagnosis: Impairment of mobility, safety and Activities of Daily Living (ADLs) due to Other Disabling Impairments:  13  Other Disabling Impairments     Etiologic: Diabetic right foot ulcer with osteomyelitis s/p fem bypass, right foot wound and achilles debridement with 2nd toe partial amputation and VAC placement   Date of Onset: 2/26/24     Date of surgery: 1/31, 2/4, 2/14    HPI: Lona Cedeno is a 78 y.o. female with history of anxiety, closed fracture of the coccyx back in May 2023, diabetes, GERD, hyperlipidemia, hypertension, IBS who presented to the [facility] on 1/23 from her podiatry office with a diabetic ulcer of the right toe and right ankle with exposed Achilles tendon.  She was found to have osteomyelitis of the right foot and transferred to [facility] to consider revascularization and surgical options.  She was recommended to undergo extra-anatomic bypass to the right axillary to family artery and a right transtibial amputation was initially discussed but decision was made for attempted limb salvage.  She underwent the above bypass on 1/31/2024 as well as on 2/4/2024 and underwent right foot wound and Achilles debridement with second toe partial amputation and VAC placement.  Split thickness skin graft was discussed with podiatry and plastic surgery but deferred to a later time.  Her course was complicated by a right chest wall hematoma status post evacuation with vascular on 2/4.  On 2/13 the patient was discharged back to the acute care hospital due to fever and leukocytosis from a potential infection at the surgical site in her groin.  She underwent washout with VAC placement on 2/14 and 2/19 and remained on IV antibiotics.     SUBJECTIVE: Patient seen face-to-face with no acute issues overnight.  Overall is moving a supervision to min assist level for  most ADL and mobility tasks needing some more help with ADLs than basic level mobility.  Contacted by medicine team who had been reached by the podiatry team who states that they would like to take her back to the OR tomorrow for split thickness skin grafting.  Discussed case with team.  Otherwise the patient denies any other issues including fever, chills, nausea, vomiting, cough or shortness of breath, diarrhea or constipation    ASSESSMENT: Stable, progressing, to go to the OR for grafting      PLAN:    Rehabilitation  Functional deficits: Self-care, mobility, cognition  Continue current rehabilitation plan of care to maximize function.    Functional update:   PT: Close supervision to min assist with use of wheelchair, min to mod assist for ambulation and transfers  OT: Total assistance for bathing, total assistance for oral hygiene, independent for eating, moderate assist for footwear and dressing total assist for toileting    Estimated Discharge: TBD in teams    DVT prophylaxis  On Xarelto     Pain  Acetaminophen 975 mg every 8 hours  Lyrica 200 mg 3 times daily  Robaxin 250 mg every 8 hours  Oxycodone 2.5-5 mg every 4 hours as needed     Bladder plan  Continent     Bowel plan  Continent  Last BM 2/24     Code Status  Level 3, DNAR/DNI         * Surgical site infection  Assessment & Plan  Needed on cefepime for surgical site infection however vancomycin was discontinued on 2/16  Right groin breakdown status post wound washout with VAC placement on 2/14 and 2/19  Was followed by infectious disease and continued on cefepime with plan to potentially transition to cefpodoxime 400 mg twice daily for 4 weeks  Cultures positive for Klebsiella and Proteus  Patient will need ongoing physical and occupational therapy 3 hours/day 5 to 7 days a week for ambulatory dysfunction and ADL deficits especially in the setting of multiple surgeries and wound vacs x 2  Ongoing rehab physician and nursing oversight for management as  well as training for transition back to the community    Impulsive  Assessment & Plan  Discussed plan with nursing as well as therapy  Patient on last admission was fairly impulsive and not using the call bell appropriately and getting up in the room not following weightbearing restrictions and using the commode.  Discussed this with the patient on evaluation today however need to be vigilant about nursing and therapy checks.  Alarms on relatively sensitive settings.  It would benefit from acute 2-hour checks for bowel and bladder    Sacral wound  Assessment & Plan  Unstageable wound on the sacrum on initial evaluations on return to the [facility] from acute care  Consult wound care for management, for now Allevyn, weight shifts and turns in bed    Other dietary vitamin B12 deficiency anemia  Assessment & Plan  Continue B12 supplementation    Sepsis without acute organ dysfunction (HCC)  Assessment & Plan  Patient with fever and leukocytosis while at the [facility] prior to send out and lactic acidosis but this resolved with IV fluids  There was concern for surgical site infection in the groin status post washout and VAC placement as listed above and surgical site infection  No growth with blood cultures however wound cultures positive for Proteus and Klebsiella  Urine culture growing greater than 100,000 CFU Enterobacter resistant to cefepime but thought to be not related to her symptoms and asymptomatic bacteriuria.  Need to consider following this further depending on her clinical course    Acute pain due to injury  Assessment & Plan  Acetaminophen 975 mg every 8 hours  Lyrica 200 mg 3 times daily  Robaxin 250 mg every 8 hours  Oxycodone 2.5-5 mg every 4 hours as needed    Impaired mobility and activities of daily living  Assessment & Plan  - Rehabilitation medicine physician for daily monitoring of care, 24 hour availability for acute  medical issues, medication management, and therapeutic and diagnostic assessments.  - 24  hour rehabilitation nursing 7 days per week for: management/teaching of medications,  bowel/bladder routine, skin care.  - PT, OT for 2-3 hours per day, 5 to 6 days per week; 15 hours per week  - Rehabilitation Psychology for adjustment and coping  - MSW for barriers to discharge, community resources, and family support  - Discharge planning following to help ensure a safe and efficient discharge    Anemia  Assessment & Plan  He will most recently at 10.9 has been improving over the last week  Continue to monitor with biweekly labs or sooner if clinically indicated  Continue B12    Diabetic foot ulcer with osteomyelitis (HCC)  Assessment & Plan  Patient with right posterior ankle ulcer with exposed Achilles tendon s/p right foot debridement on 2/4/2024 with VAC, right medial ankle ulcer, right second toe osteomyelitis status post right second toe amputation  Patient now with wound VAC x 2, managed initially by podiatry for the foot and for surgery for the groin  Podiatry would like to take the patient back to acute care for an OR split thickness grafting on 2/28 with plan discharged on 2/27.  On review of previous notes that this was something considered but timeframe was not specifically noted.    Tobacco use disorder  Assessment & Plan  Encourage cessation, continue nicotine patch    PAD (peripheral artery disease) ([facility])  Assessment & Plan  1/31 BYPASS AXILLO-FEMORAL, RIGHT WITH 8MM RINGED PTFE GRAFT   Continue aspirin, statin and Xarelto 2.5 mg twice daily    GERD without esophagitis  Assessment & Plan  Continue protonix    Anxiety and depression  Assessment & Plan  Continue Wellbutrin  mg daily  Supportive counseling    Essential hypertension  Assessment & Plan  Management per internal medicine however currently off of antihypertensives    Type 2 diabetes mellitus with diabetic polyneuropathy ([facility])  Assessment & Plan  Lab Results   Component Value Date    HGBA1C 6.5 (H) 11/07/2023       Recent Labs      "02/25/24  2043 02/26/24  0630 02/26/24  1101 02/26/24  1553   POCGLU 129 141* 203* 99       Had been on metformin as an outpatient but held while inpatient currently managed on a sliding scale insulin regimen  Monitor per internal medicine and make adjustments as needed  (P) [id number]            Appreciate IM consultants medical co-management.  Labs, medications, and imaging personally reviewed.      ROS:  A ten point review of systems was completed on 02/27/24 and pertinent positives are listed in subjective section. All other systems reviewed were negative.     OBJECTIVE:   /66 (BP Location: Right arm)   Pulse 69   Temp 97.7 °F (36.5 °C) (Oral)   Resp 17   Ht 5' 1\" (1.549 m)   Wt 69.4 kg (153 lb)   SpO2 97%   BMI 28.91 kg/m²     Physical Exam  Vitals reviewed.   Constitutional:       General: She is not in acute distress.  HENT:      Head: Normocephalic.      Right Ear: External ear normal.      Left Ear: External ear normal.      Nose: Nose normal. No rhinorrhea.      Mouth/Throat:      Mouth: Mucous membranes are moist.      Pharynx: Oropharynx is clear.   Eyes:      General: No scleral icterus.  Cardiovascular:      Rate and Rhythm: Normal rate.   Pulmonary:      Effort: Pulmonary effort is normal. No respiratory distress.   Abdominal:      General: There is no distension.      Palpations: Abdomen is soft.   Musculoskeletal:      Cervical back: Normal range of motion.      Comments: Wound VAC on the right foot as well as in the groin   Skin:     General: Skin is warm and dry.   Neurological:      General: No focal deficit present.      Mental Status: She is alert and oriented to person, place, and time.      Comments: Generalized nonneurologic weakness in the bilateral lower extremities   Psychiatric:         Mood and Affect: Mood normal.         Behavior: Behavior normal.          Lab Results   Component Value Date    WBC 7.99 02/23/2024    HGB 10.9 (L) 02/23/2024    HCT 35.3 02/23/2024 "     (H) 02/23/2024     02/23/2024     Lab Results   Component Value Date    SODIUM 137 02/23/2024    K 3.9 02/23/2024     02/23/2024    CO2 30 02/23/2024    BUN 17 02/23/2024    CREATININE 0.59 (L) 02/23/2024    GLUC 160 (H) 02/23/2024    CALCIUM 9.4 02/23/2024     Lab Results   Component Value Date    INR 1.16 02/14/2024    INR 1.03 02/04/2024    INR 1.11 02/03/2024    PROTIME 14.7 (H) 02/14/2024    PROTIME 13.4 02/04/2024    PROTIME 14.2 02/03/2024           Current Facility-Administered Medications:     acetaminophen (TYLENOL) tablet 975 mg, 975 mg, Oral, Q8H JILLIAN, ROXANE Gaona, 975 mg at 02/27/24 0616    albuterol (PROVENTIL HFA,VENTOLIN HFA) inhaler 2 puff, 2 puff, Inhalation, Q6H PRN, ROXANE Gaona    aspirin (ECOTRIN LOW STRENGTH) EC tablet 81 mg, 81 mg, Oral, Daily, ROXANE Gaona, 81 mg at 02/27/24 0817    atorvastatin (LIPITOR) tablet 10 mg, 10 mg, Oral, After Dinner, ROXANE Gaona, 10 mg at 02/26/24 1712    bisacodyl (DULCOLAX) EC tablet 5 mg, 5 mg, Oral, Daily PRN, ROXANE Gaona    buPROPion (WELLBUTRIN XL) 24 hr tablet 300 mg, 300 mg, Oral, Daily, ROXANE Gaona, 300 mg at 02/27/24 0818    cefpodoxime (VANTIN) tablet 400 mg, 400 mg, Oral, BID With Meals, ROXANE Gaona, 400 mg at 02/27/24 0819    clonazePAM (KlonoPIN) tablet 1 mg, 1 mg, Oral, QPM, ROXANE Gaona, 1 mg at 02/26/24 2028    cyanocobalamin (VITAMIN B-12) tablet 1,000 mcg, 1,000 mcg, Oral, Daily, ROXANE Gaona, 1,000 mcg at 02/27/24 0819    fish oil capsule 1,000 mg, 1,000 mg, Oral, Daily, ROXANE Gaona, 1,000 mg at 02/27/24 0820    fluticasone-vilanterol 200-25 mcg/actuation 1 puff, 1 puff, Inhalation, Daily, ROXANE Gaona, 1 puff at 02/27/24 0820    insulin lispro (HumaLOG) 100 units/mL subcutaneous injection 1-5 Units, 1-5 Units, Subcutaneous, TID AC, 1 Units at 02/26/24  1205 **AND** Fingerstick Glucose (POCT), , , TID AC, ROXANE Gaona    insulin lispro (HumaLOG) 100 units/mL subcutaneous injection 1-5 Units, 1-5 Units, Subcutaneous, HS, ROXANE Gaona    metFORMIN (GLUCOPHAGE) tablet 500 mg, 500 mg, Oral, BID With Meals, ROXANE Gaona, 500 mg at 02/27/24 0739    methocarbamol (ROBAXIN) tablet 250 mg, 250 mg, Oral, Q8H JILLIAN, ROXANE Gaona, 250 mg at 02/27/24 0616    multivitamin-minerals (CENTRUM) tablet 1 tablet, 1 tablet, Oral, Daily, ROXANE Gaona, 1 tablet at 02/27/24 0820    nicotine (NICODERM CQ) 14 mg/24hr TD 24 hr patch 1 patch, 1 patch, Transdermal, Daily, ROXANE Gaona, 1 patch at 02/27/24 0831    ondansetron (ZOFRAN-ODT) dispersible tablet 4 mg, 4 mg, Oral, Q6H PRN, ROXANE Gaona    oxybutynin (DITROPAN-XL) 24 hr tablet 5 mg, 5 mg, Oral, Daily, ROXANE Gaona, 5 mg at 02/27/24 0817    oxyCODONE (ROXICODONE) split tablet 2.5 mg, 2.5 mg, Oral, Q4H PRN, 2.5 mg at 02/24/24 1708 **OR** oxyCODONE (ROXICODONE) IR tablet 5 mg, 5 mg, Oral, Q4H PRN, ROXANE Gaona, 5 mg at 02/26/24 1805    pantoprazole (PROTONIX) EC tablet 40 mg, 40 mg, Oral, Early Morning, ROXANE Gaona, 40 mg at 02/27/24 0616    polyethylene glycol (MIRALAX) packet 17 g, 17 g, Oral, Daily, ROXANE Gaona, 17 g at 02/27/24 0821    pregabalin (LYRICA) capsule 200 mg, 200 mg, Oral, TID, ROXANE Gaona, 200 mg at 02/27/24 0826    rivaroxaban (XARELTO) tablet 2.5 mg, 2.5 mg, Oral, BID With Meals, ROXANE Gaona, 2.5 mg at 02/27/24 0822    senna-docusate sodium (SENOKOT S) 8.6-50 mg per tablet 1 tablet, 1 tablet, Oral, BID, ROXANE Gaona, 1 tablet at 02/27/24 0822    Past Medical History:   Diagnosis Date    Anxiety     Closed fracture of coccyx (HCC) 05/24/2023    Diabetes mellitus (HCC)     GERD (gastroesophageal reflux disease)      Hyperlipidemia     Hypertension     IBS (irritable bowel syndrome)     Shoulder fracture        Patient Active Problem List    Diagnosis Date Noted    Surgical site infection 02/13/2024    Sacral wound 02/23/2024    Impulsive 02/23/2024    Other dietary vitamin B12 deficiency anemia 02/16/2024    Leukocytosis 02/13/2024    Sepsis without acute organ dysfunction (HCC) 02/13/2024    At risk for venous thromboembolism (VTE) 02/09/2024    At high risk for skin breakdown 02/09/2024    Wound of skin 02/09/2024    Impaired mobility and activities of daily living 02/09/2024    Acute pain due to injury 02/09/2024    Hematoma 02/06/2024    Anemia 02/06/2024    Constipation 01/26/2024    Preoperative cardiovascular examination 01/25/2024    Diabetic foot ulcer with osteomyelitis (HCC) 01/23/2024    Ankle ulcer, right, with fat layer exposed ([facility]) 12/14/2023    Age-related osteoporosis without current pathological fracture 11/28/2023    Abnormal CT of the chest 10/17/2023    COPD, severity to be determined ([facility]) 10/17/2023    Tobacco use disorder 10/17/2023    PAD (peripheral artery disease) ([facility]) 10/10/2023    Bladder neoplasm 09/11/2023    Left renal mass 09/11/2023    Lactic acidosis 08/11/2023    Hypomagnesemia 08/11/2023    Degenerative lumbar disc 05/24/2023    Urinary incontinence 04/13/2023    GERD without esophagitis 10/16/2019    Essential hypertension 02/15/2019    Anxiety and depression 02/15/2019    Type 2 diabetes mellitus with diabetic polyneuropathy (HCC) 02/12/2019          Rigo Hoover,   Physical Medicine and Rehabilitation  [facility]    I have spent a total time of 35 minutes on 02/27/24 in caring for this patient including Counseling / Coordination of care, Documenting in the medical record, and Communicating with other healthcare professionals .    ** Please Note:  voice to text software may have been used in the creation of this document. Although proof errors in  transcription or interpretation are a potential of such software**

## 2024-02-27 NOTE — ASSESSMENT & PLAN NOTE
S/p right axillary to femoral BPG 1/31/24  Right axillary I&D/hematoma evacuation 2/4/24  Debridement right groin/washout/VAC dressing 2/14/24 for infection  Repeat right groin washout 2/19/24 for infection  Continue ASA, Xarelto and atorvastatin.  Encouraged tobacco cessation.  Price check on Xarelto = $20.00/mo   VAC dressing per surgery

## 2024-02-27 NOTE — ASSESSMENT & PLAN NOTE
Right groin surgical wound infection (from previous RLE bypass)  Wound cx were positive for Klebsiella and Proteus  S/p OR 2/14 and 2/19 for debride and washout  Currently has VAC dressing on the right groin surgery is managing  Needs long term antibiotic 2/2 proximity of the wound infection to the bypass graft  Continue Cefpodoxime 400 mg q12hrs x 28 days starting 2/23/24 thru 3/22/24  Monitor wound/cbc/fever curve

## 2024-02-27 NOTE — ASSESSMENT & PLAN NOTE
s/p right foot wound/achilles debridement with foot/toe washout; partial amputation distal right 2nd toe; wound VAC 2/4/24   VAC changes per Podiatry  Per podiatry resident pt is ready for STSG possibly in am  Will reach out to me later in day if there is room on the OR schedule  Pt will need to go back to acute side for procedure

## 2024-02-27 NOTE — DISCHARGE SUMMARY
[facility]  Discharge- Lona Cedeno 1945, 78 y.o. 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)  Wound cx were positive for Klebsiella and Proteus  S/p OR 2/14 and 2/19 for debride and washout  Currently has VAC dressing on the right groin surgery is managing  Needs long term antibiotic 2/2 proximity of the wound infection to the bypass graft  Continue Cefpodoxime 400 mg q12hrs x 28 days starting 2/23/24 thru 3/22/24  Monitor wound/cbc/fever curve    PAD (peripheral artery disease) (HCC)  Assessment & Plan  S/p right axillary to femoral BPG 1/31/24  Right axillary I&D/hematoma evacuation 2/4/24  Debridement right groin/washout/VAC dressing 2/14/24 for infection  Repeat right groin washout 2/19/24 for infection  Continue ASA, Xarelto and atorvastatin.  Encouraged tobacco cessation.  Price check on Xarelto = $20.00/mo   VAC dressing per surgery    Essential hypertension  Assessment & Plan  Home: Amiloride 5mg qd/Lisinopril 5mg qd  Here: no meds  stable    Type 2 diabetes mellitus with diabetic polyneuropathy (HCC)  Assessment & Plan  Continue DM diet; Accuchecks/SSI  Home: Januvia 100mg qd/Metformin 1000mg qd  Here: Metformin 500mg BID  Continue QID Accuchecks/SSI and DM diet  BS stable  If OR tomorrow will hold metformin in the am    Sacral wound  Assessment &  Plan  POA to [facility]  WC following=off loading/local dressing  Refer to media images  Management by PMR    Tobacco use disorder  Assessment & Plan  Continue nicotine patch  Recommend cessation        Discharge Summary   Lona Cedeno 78 y.o. female MRN: [id number]  Unit/Bed#: [facility] 972-01 Encounter: [id number]  Admission Date: 2/23/2024     Discharge Date: 2/27/2024    Etiologic/Rehabilitation Diagnosis: Impairment of mobility, safety and Activities of Daily Living (ADLs) due to Debility:  16  Debility (Non-cardiac/Non-pulmonary)    HPI: Please refer to hospital records    Summary of [facility] Course: Pt came to [facility] on Friday d/t debility, complex wounds of right groin, osteomyelitis of right ankle s/p washout. She was on IV abx and now is on oral abx. She has been participating in therapy and will need to return to acute care for STSG per podiatry.     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)  Wound cx were positive for Klebsiella and Proteus  S/p OR 2/14 and 2/19 for debride and washout  Currently has VAC dressing on the right groin surgery is managing  Needs long term antibiotic 2/2 proximity of the wound infection to the bypass graft  Continue Cefpodoxime 400 mg q12hrs x 28 days starting 2/23/24 thru 3/22/24  Monitor wound/cbc/fever curve    PAD (peripheral artery disease) ([facility])  Assessment & Plan  S/p right axillary to femoral BPG 1/31/24  Right axillary I&D/hematoma evacuation 2/4/24  Debridement right groin/washout/VAC dressing 2/14/24 for  "infection  Repeat right groin washout 2/19/24 for infection  Continue ASA, Xarelto and atorvastatin.  Encouraged tobacco cessation.  Price check on Xarelto = $20.00/mo   VAC dressing per surgery    Essential hypertension  Assessment & Plan  Home: Amiloride 5mg qd/Lisinopril 5mg qd  Here: no meds  stable    Type 2 diabetes mellitus with diabetic polyneuropathy (HCC)  Assessment & Plan  Continue DM diet; Accuchecks/SSI  Home: Januvia 100mg qd/Metformin 1000mg qd  Here: Metformin 500mg BID  Continue QID Accuchecks/SSI and DM diet  BS stable  If OR tomorrow will hold metformin in the am    Sacral wound  Assessment & Plan  POA to [facility]  WC following=off loading/local dressing  Refer to media images  Management by PMR    Tobacco use disorder  Assessment & Plan  Continue nicotine patch  Recommend cessation        Discharge Physical Examination:  /66 (BP Location: Right arm)   Pulse 69   Temp 97.7 °F (36.5 °C) (Oral)   Resp 17   Ht 5' 1\" (1.549 m)   Wt 69.4 kg (153 lb)   SpO2 97%   BMI 28.91 kg/m²     GEN: NAD; pleasant  NEURO: Alert and oriented x4; appropriate  HEENT: Pupils are equal/reactive; normocephalic, face is symmetrical, hearing is normal  CV: S1 S2 regular, no murmur/rub/gallops, 1/4 pedal pulses, 1/4 RLE edema present  RESP: Lungs are clear bilaterally, no wheezes rales or rhonchi, on room air, no distress, respirations are easy and non labored  GI: Abdomen is flat, soft, non tender, +BS x4; non distended  : Voiding without issues  MUSC: Moves all extremities except R groin with wound VAC in place; R ankle with wound VAC in place  SKIN: pink, warm, normal turgor, no rashes or lesions noted      Significant Findings, Care, Treatment and Services Provided: Acute comprehensive interdisciplinary inpatient rehabilitation including PT, OT, SLP, RN, CM, SW, dietary, psychology, etc.      Physiatry Additions/Comorbidities:    Surgical site infection  Assessment & Plan  Needed on cefepime for surgical site " infection however vancomycin was discontinued on 2/16  Right groin breakdown status post wound washout with VAC placement on 2/14 and 2/19  Was followed by infectious disease and continued on cefepime with plan to potentially transition to cefpodoxime 400 mg twice daily for 4 weeks  Cultures positive for Klebsiella and Proteus  Patient will need ongoing physical and occupational therapy 3 hours/day 5 to 7 days a week for ambulatory dysfunction and ADL deficits especially in the setting of multiple surgeries and wound vacs x 2  Ongoing rehab physician and nursing oversight for management as well as training for transition back to the community     Impulsive  Assessment & Plan  Discussed plan with nursing as well as therapy  Patient on last admission was fairly impulsive and not using the call bell appropriately and getting up in the room not following weightbearing restrictions and using the commode.  Discussed this with the patient on evaluation today however need to be vigilant about nursing and therapy checks.  Alarms on relatively sensitive settings.  It would benefit from acute 2-hour checks for bowel and bladder    Sacral wound  Assessment & Plan  Unstageable wound on the sacrum on initial evaluations on return to the [facility] from acute care  Consult wound care for management, for now Allevyn, weight shifts and turns in bed    Acute pain due to injury  Assessment & Plan  Acetaminophen 975 mg every 8 hours  Lyrica 200 mg 3 times daily  Robaxin 250 mg every 8 hours  Oxycodone 2.5-5 mg every 4 hours as needed     Impaired mobility and activities of daily living  Assessment & Plan  - Rehabilitation medicine physician for daily monitoring of care, 24 hour availability for acute  medical issues, medication management, and therapeutic and diagnostic assessments.  - 24 hour rehabilitation nursing 7 days per week for: management/teaching of medications,  bowel/bladder routine, skin care.  - PT, OT for 2-3 hours per day, 5  to 6 days per week; 15 hours per week  - Rehabilitation Psychology for adjustment and coping  - MSW for barriers to discharge, community resources, and family support  - Discharge planning following to help ensure a safe and efficient discharge    Tobacco use disorder  Assessment & Plan  Encourage cessation, continue nicotine patch     Anxiety and depression  Assessment & Plan  Continue Wellbutrin  mg daily  Supportive counseling    Acute Rehabilitation Center Course: Patient participated in a comprehensive interdisciplinary inpatient rehabilitation program which included involvment of Rehab MD, therapies (PT, OT, and/or SLP), RN, CM, SW, dietary, and psychology services.     Functional Status Upon Admission to [facility]:  Physical Therapy: Minimal assistance for transfers bed mobility, no ambulation attempted  Occupational Therapy: Min assist for upper body ADLs and max assist for lower body ADLs, supervision for eating and grooming  Speech Therapy: Not assessed    Functional Status Upon Discharge from [facility]:   PT: Close supervision to min assist with use of wheelchair, min to mod assist for ambulation and transfers  OT: Total assistance for bathing, total assistance for oral hygiene, independent for eating, moderate assist for footwear and dressing total assist for toileting    Condition at Discharge: good     Rehab Physician Signature:     Rigo Hoover DO  Physical Medicine and Rehabilitation  [facility]      Discharge instructions/Information to patient and family:   See after visit summary for information provided to patient and family.      Provisions for Follow-Up Care:  See after visit summary for information related to follow-up care and any pertinent home health orders.      Future Appointments   Date Time Provider Department Center   4/3/2024  9:20 AM DO [facility]       PCP: Vivek Preston [phone]    Disposition:  Acute hospital    Planned  Readmission: No    Discharge Statement   I spent 30 minutes or more discharging the patient.  I had direct contact with the patient on the day of discharge. Greater than 50% of the total time was spent examining patient, answering all patient questions, arranging and discussing plan of care with patient and care team as well as directly providing post-discharge instructions. Additional time then spent on discharge activities.    Discharge Medications:  See after visit summary for reconciled discharge medications provided to patient and family.

## 2024-02-27 NOTE — PROGRESS NOTES
[facility]  Progress Note  Name: Lona Cedeno I  MRN: [id number]  Unit/Bed#: -01 I Date of Admission: 2/23/2024   Date of Service: 2/27/2024  Hospital Day: 4    Assessment/Plan   Diabetic foot ulcer with osteomyelitis (HCC)  Assessment & Plan  s/p right foot wound/achilles debridement with foot/toe washout; partial amputation distal right 2nd toe; wound VAC 2/4/24   VAC changes per Podiatry  Per podiatry resident pt is ready for STSG possibly in am  Will reach out to me later in day if there is room on the OR schedule  Pt will need to go back to acute side for procedure    * Surgical site infection  Assessment & Plan  Right groin surgical wound infection (from previous RLE bypass)  Wound cx were positive for Klebsiella and Proteus  S/p OR 2/14 and 2/19 for debride and washout  Currently has VAC dressing on the right groin surgery is managing  Needs long term antibiotic 2/2 proximity of the wound infection to the bypass graft  Continue Cefpodoxime 400 mg q12hrs x 28 days starting 2/23/24 thru 3/22/24  Monitor wound/cbc/fever curve    PAD (peripheral artery disease) (HCC)  Assessment & Plan  S/p right axillary to femoral BPG 1/31/24  Right axillary I&D/hematoma evacuation 2/4/24  Debridement right groin/washout/VAC dressing 2/14/24 for infection  Repeat right groin washout 2/19/24 for infection  Continue ASA, Xarelto and atorvastatin.  Encouraged tobacco cessation.  Price check on Xarelto = $20.00/mo   VAC dressing per surgery    Essential hypertension  Assessment & Plan  Home: Amiloride 5mg qd/Lisinopril 5mg qd  Here: no meds  stable    Type 2 diabetes mellitus with diabetic polyneuropathy (HCC)  Assessment & Plan  Continue DM diet; Accuchecks/SSI  Home: Januvia 100mg qd/Metformin 1000mg qd  Here: Metformin 500mg BID  Continue QID Accuchecks/SSI and DM diet  BS stable  If OR tomorrow will hold metformin in the am    Sacral wound  Assessment & Plan  POA to [facility]  following=off loading/local dressing  Refer to media images  Management by PMR    Tobacco use disorder  Assessment & Plan  Continue nicotine patch  Recommend cessation                 The above assessment and plan was reviewed and updated as determined by my evaluation of the patient on 2/27/2024.    Labs:   Results from last 7 days   Lab Units 02/23/24  0553 02/21/24  0507   WBC Thousand/uL 7.99 9.65   HEMOGLOBIN g/dL 10.9* 11.0*   HEMATOCRIT % 35.3 34.4*   PLATELETS Thousands/uL 315 343     Results from last 7 days   Lab Units 02/23/24  0553 02/21/24  0507   SODIUM mmol/L 137 142   POTASSIUM mmol/L 3.9 4.1   CHLORIDE mmol/L 100 103   CO2 mmol/L 30 32   BUN mg/dL 17 10   CREATININE mg/dL 0.59* 0.55*   CALCIUM mg/dL 9.4 9.7             Results from last 7 days   Lab Units 02/27/24  0624 02/26/24  2104 02/26/24  1553   POC GLUCOSE mg/dl 118 115 99       Imaging  No orders to display       Review of Scheduled Meds:  Current Facility-Administered Medications   Medication Dose Route Frequency Provider Last Rate    acetaminophen  975 mg Oral Q8H [facility] ROXANE Gaona      albuterol  2 puff Inhalation Q6H PRN ROXANE Gaona      aspirin  81 mg Oral Daily ROXANE Gaona      atorvastatin  10 mg Oral After Dinner ROXANE Gaona      bisacodyl  5 mg Oral Daily PRN ROXANE Gaona      buPROPion  300 mg Oral Daily ROXANE Gaona      cefpodoxime  400 mg Oral BID With Meals ROXANE Gaona      clonazePAM  1 mg Oral QPM ROXANE Gaona      vitamin B-12  1,000 mcg Oral Daily ROXANE Gaona      fish oil  1,000 mg Oral Daily ROXANE Gaona      fluticasone-vilanterol  1 puff Inhalation Daily ROXANE Gaona      insulin lispro  1-5 Units Subcutaneous TID AC ROXANE Gaona      insulin lispro  1-5 Units Subcutaneous HS ROXANE Gaona      metFORMIN  500 mg Oral BID With  "Meals Melany Bautista, CRANGI      methocarbamol  250 mg Oral Q8H JILLIAN Melany Bautista, CRNP      multivitamin-minerals  1 tablet Oral Daily Melany Bautista, CRNP      nicotine  1 patch Transdermal Daily Melany Bautista, CRNP      ondansetron  4 mg Oral Q6H PRN Melany Bautista, FAUSTONP      oxybutynin  5 mg Oral Daily Melany Bautista, CRNP      oxyCODONE  2.5 mg Oral Q4H PRN Melany Bautista, CRNP      Or    oxyCODONE  5 mg Oral Q4H PRN Melany Bautista, CRNP      pantoprazole  40 mg Oral Early Morning Melany Bautista, CRNP      polyethylene glycol  17 g Oral Daily Melany Bautista, CRNP      pregabalin  200 mg Oral TID Melany Bautista, FAUSTONP      rivaroxaban  2.5 mg Oral BID With Meals Melany Bautista, FAUSTONP      senna-docusate sodium  1 tablet Oral BID Melany Bautista, FAUSTONP         Subjective/ HPI: Patient seen and examined. Patients overnight issues or events were reviewed with nursing staff. New or overnight issues include the following:     Pt seen at bedside. D/w her the plan from podiatry at this point she is unsure whether or not she wants to proceed with STSG. I paged podiatry and they will evaluate her later today. She is aware that she will need to be transferred off the unit if she agrees to proceed.     ROS:   A 10 point ROS was performed; negative except as noted above.        *Labs /Radiology studies Reviewed  *Medications  reviewed and reconciled as needed  *Please refer to order section for additional ordered labs studies      Physical Examination:  Vitals:   Vitals:    02/26/24 1445 02/26/24 2026 02/27/24 0627 02/27/24 0700   BP: 102/57 110/57 107/66    BP Location: Right arm Right arm Right arm    Pulse: 75 68 69    Resp: 17  17    Temp: 97.8 °F (36.6 °C) 97.6 °F (36.4 °C) 97.7 °F (36.5 °C)    TempSrc: Oral Oral Oral    SpO2: 95% 96% 97%    Weight:       Height:    5' 1\" (1.549 m)       General Appearance: NAD; pleasant  HEENT: " PERRLA, conjuctiva normal; mucous membranes moist; face symmetrical  Neck:  Supple  Lungs: clear bilaterally, normal respiratory effort, no retractions, expiratory effort normal, on room air  CV: regular rate and rhythm, no murmurs rubs or gallops noted   ABD: soft non tender, +BS x4; obese  EXT: DP pulses intact, no lymphadenopathy, no edema; VAC in place to right groin; VAC in place to right ankle. Abrasion noted on left shin, heel protector on left heel; sacral wound with dressing in place  Skin: normal turgor, normal texture, no rash  Psych: affect normal, mood normal  Neuro: AAOx3       The above physical exam was reviewed and updated as determined by my evaluation of the patient on 2/27/2024.    Invasive Devices       Peripheral Intravenous Line  Duration             Peripheral IV 02/21/24 Dorsal (posterior);Left Forearm 6 days              Drain  Duration             Ureteral Internal Stent Left ureter 6 Fr. 98 days                       VTE Pharmacologic Prophylaxis: Xarelto  Code Status: Level 3 - DNAR and DNI  Current Length of Stay: 4 day(s)    Total floor / unit time spent today 30 minutes  Coordination of patient's care was performed in conjunction with primary service. Time invested included review of patient's labs, vitals, and management of their comorbidities with continued monitoring, examination of patient as well as answering patient questions, documenting her findings and creating progress note in electronic medical record,  ordering appropriate diagnostic testing.       ** Please Note:  voice to text software may have been used in the creation of this document. Although proof errors in transcription or interpretation are a potential of such software**

## 2024-02-27 NOTE — ASSESSMENT & PLAN NOTE
Continue DM diet; Accuchecks/SSI  Home: Januvia 100mg qd/Metformin 1000mg qd  Here: Metformin 500mg BID  Continue QID Accuchecks/SSI and DM diet  BS stable  If OR tomorrow will hold metformin in the am

## 2024-02-27 NOTE — PROGRESS NOTES
"   02/27/24 1230   Pain Assessment   Pain Assessment Tool 0-10   Pain Score No Pain   Lifestyle   Autonomy \"I am just so upset. I want to let it go and go home.\"   Eating   Type of Assistance Needed Independent   Eating CARE Score 6   Oral Hygiene   Type of Assistance Needed Set-up / clean-up   Comment seated in WC at sink   Oral Hygiene CARE Score 5   Shower/Bathe Self   Type of Assistance Needed Physical assistance   Physical Assistance Level 25% or less   Comment per supine bed level training. inc time to manage fatigue   Shower/Bathe Self CARE Score 3   Upper Body Dressing   Type of Assistance Needed Supervision;Set-up / clean-up   Upper Body Dressing CARE Score 4   Lower Body Dressing   Type of Assistance Needed Set-up / clean-up   Comment supine dressing technique   Lower Body Dressing CARE Score 5   Putting On/Taking Off Footwear   Type of Assistance Needed Set-up / clean-up   Comment Dressing R foot not aplpicable   Putting On/Taking Off Footwear CARE Score 5   Roll Left and Right   Type of Assistance Needed Supervision   Roll Left and Right CARE Score 4   Sit to Lying   Type of Assistance Needed Supervision   Sit to Lying CARE Score 4   Lying to Sitting on Side of Bed   Type of Assistance Needed Supervision   Lying to Sitting on Side of Bed CARE Score 4   Bed-Chair Transfer   Type of Assistance Needed Physical assistance   Physical Assistance Level 26%-50%   Comment sit piv to L to WC. assistance for management of VACS.   Chair/Bed-to-Chair Transfer CARE Score 3   Toileting Hygiene   Type of Assistance Needed Physical assistance   Physical Assistance Level 76% or more   Toileting Hygiene CARE Score 2   Toilet Transfer   Type of Assistance Needed Physical assistance   Physical Assistance Level 26%-50%   Comment Sit piv to L to drop arm BSC   Toilet Transfer CARE Score 3   Exercise Tools   UE Ergometer Seated in w/c with Sup, tabletop UE ERGOMETER. 5min prograde, 5min retrograde x 3 against min resistance for " "BUE strengthening and endurance training, for increased independence during fxl transfers. Pt tolerated well with rest break x1 to manage overall fatigue.   Additional Activities   Additional Activities Comments set up with with 16'16 WC at this time for improved sit and Efficiency. set up with alarm and ROHO cushion.   Assessment   Treatment Assessment Pt participated in skilled OT treatment session with treatment focus on ADL re-training, fxnl xfers, and family training/education. Pt tolerated session fair today. Upon approach pt is upset and tearful about upcoming surgery tomorrow. Pt is questioning that she made the right choice. She states \"I just should let it go, and go home.\" Today engages a battery of items including D/C ADL at bed level. Able to complete with inc time and set up. Largest area of assistance for ADLs is toileting out of bed. Pt cont to require max vc's to maintain RLE NWB status during standing activities. Pt cont to be limited by decreased fxl cognition, standing balance/tolerance, [place], impulsive, multiple line management, endurance, strength, trunk control, and act shavon. Pt would benefit from continued skilled OT focused on ADL/IADL retraining, LHAE education/training, Kitchen Mobilty, Meal preparation, Medication management , Functional Transfers, Functional Cognition, Functional Attention, MOCA, Standing tolerance, Standing balance , DME training/education, Family training/education, Energy conservation training/education.   OT Family training done with: Spoke with daughter Rozina at length today with discussion of current level of function for ADLs. Educated that she will require the use of a WC at this time, if weight bearing status remains. Reports she anticipates that they will be able to assist her in bumping up the WC. Someone will be present with her at all times at home with her  and daughter. Updated Rozina on current level of function for PT activities. Asked Rozina to " obtain measurements and video for home set up to simulate home.   OT Therapy Minutes   OT Time In 1230   OT Time Out 1400   OT Total Time (minutes) 90   OT Mode of treatment - Individual (minutes) 90   OT Mode of treatment - Concurrent (minutes) 0   OT Mode of treatment - Group (minutes) 0   OT Mode of treatment - Co-treat (minutes) 0   OT Mode of Treatment - Total time(minutes) 90 minutes   OT Cumulative Minutes 270

## 2024-02-27 NOTE — ASSESSMENT & PLAN NOTE
s/p right foot wound/achilles debridement with foot/toe washout; partial amputation distal right 2nd toe; wound VAC 2/4/24   VAC changes per Podiatry  Per podiatry resident pt is ready for STSG 2/28/24  Pt will need to go back to acute side for procedure  S/w SLIM and transfer center and they are working on a bed

## 2024-02-27 NOTE — PLAN OF CARE
Problem: PAIN - ADULT  Goal: Verbalizes/displays adequate comfort level or baseline comfort level  Description: Interventions:  - Encourage patient to monitor pain and request assistance  - Assess pain using appropriate pain scale  - Administer analgesics based on type and severity of pain and evaluate response  - Implement non-pharmacological measures as appropriate and evaluate response  - Consider cultural and social influences on pain and pain management  - Notify physician/advanced practitioner if interventions unsuccessful or patient reports new pain  Outcome: Progressing     Problem: INFECTION - ADULT  Goal: Absence or prevention of progression during hospitalization  Description: INTERVENTIONS:  - Assess and monitor for signs and symptoms of infection  - Monitor lab/diagnostic results  - Monitor all insertion sites, i.e. indwelling lines, tubes, and drains  - Monitor endotracheal if appropriate and nasal secretions for changes in amount and color  - [place] appropriate cooling/warming therapies per order  - Administer medications as ordered  - Instruct and encourage patient and family to use good hand hygiene technique  - Identify and instruct in appropriate isolation precautions for identified infection/condition  Outcome: Progressing  Goal: Absence of fever/infection during neutropenic period  Description: INTERVENTIONS:  - Monitor WBC    Outcome: Progressing     Problem: SAFETY ADULT  Goal: Patient will remain free of falls  Description: INTERVENTIONS:  - Educate patient/family on patient safety including physical limitations  - Instruct patient to call for assistance with activity   - Consult OT/PT to assist with strengthening/mobility   - Keep Call bell within reach  - Keep bed low and locked with side rails adjusted as appropriate  - Keep care items and personal belongings within reach  - Initiate and maintain comfort rounds  - Make Fall Risk Sign visible to staff  - Offer Toileting every 4 Hours,  in advance of need  - Initiate/Maintain 4alarm  - Obtain necessary fall risk management equipment: 4  - Apply yellow socks and bracelet for high fall risk patients  - Consider moving patient to room near nurses station  Outcome: Progressing  Goal: Maintain or return to baseline ADL function  Description: INTERVENTIONS:  -  Assess patient's ability to carry out ADLs; assess patient's baseline for ADL function and identify physical deficits which impact ability to perform ADLs (bathing, care of mouth/teeth, toileting, grooming, dressing, etc.)  - Assess/evaluate cause of self-care deficits   - Assess range of motion  - Assess patient's mobility; develop plan if impaired  - Assess patient's need for assistive devices and provide as appropriate  - Encourage maximum independence but intervene and supervise when necessary  - Involve family in performance of ADLs  - Assess for home care needs following discharge   - Consider OT consult to assist with ADL evaluation and planning for discharge  - Provide patient education as appropriate  Outcome: Progressing  Goal: Maintains/Returns to pre admission functional level  Description: INTERVENTIONS:  - Perform AM-PAC 6 Click Basic Mobility/ Daily Activity assessment daily.  - Set and communicate daily mobility goal to care team and patient/family/caregiver.   - Collaborate with rehabilitation services on mobility goals if consulted  - Perform Range of Motion 4 times a day.  - Reposition patient every 4 hours.  - Dangle patient 4 times a day  - Stand patient 4 times a day  - Ambulate patient 4 times a day  - Out of bed to chair 4 times a day   - Out of bed for meals 4 times a day  - Out of bed for toileting  - Record patient progress and toleration of activity level   Outcome: Progressing     Problem: DISCHARGE PLANNING  Goal: Discharge to home or other facility with appropriate resources  Description: INTERVENTIONS:  - Identify barriers to discharge w/patient and caregiver  -  Arrange for needed discharge resources and transportation as appropriate  - Identify discharge learning needs (meds, wound care, etc.)  - Arrange for interpretive services to assist at discharge as needed  - Refer to Case Management Department for coordinating discharge planning if the patient needs post-hospital services based on physician/advanced practitioner order or complex needs related to functional status, cognitive ability, or social support system  Outcome: Progressing     Problem: Knowledge Deficit  Goal: Patient/family/caregiver demonstrates understanding of disease process, treatment plan, medications, and discharge instructions  Description: Complete learning assessment and assess knowledge base.  Interventions:  - Provide teaching at level of understanding  - Provide teaching via preferred learning methods  Outcome: Progressing

## 2024-02-27 NOTE — TREATMENT PLAN
Podiatry Treatment Plan     Discussed in depth with patient the surgical plans from podiatry standpoint for right posterior ankle wound.     Plan for STSG application to right lower extremity with possible wound debridement, possible wound vac application. Case request placed with Dr. Yeboah on 2/28/24, scheduled for 12pm. Patient will be NPO at midnight in preparation for OR plans with xarelto dose held     Surgical consent form was discussed in depth with patient. Form signed by patient and provider at bedside, scanned into media, placed in the OR. Verbal informed consent was also obtained at bedside today. Patient is aware of details of procedure including risks and complications. Patient is amenable to procedure and will proceed with procedure as planned.

## 2024-02-28 ENCOUNTER — ANESTHESIA EVENT (INPATIENT)
Dept: PERIOP | Facility: HOSPITAL | Age: 79
DRG: 623 | End: 2024-02-28
Payer: COMMERCIAL

## 2024-02-28 ENCOUNTER — TRANSITIONAL CARE MANAGEMENT (OUTPATIENT)
Dept: FAMILY MEDICINE CLINIC | Facility: CLINIC | Age: 79
End: 2024-02-28

## 2024-02-28 ENCOUNTER — ANESTHESIA (INPATIENT)
Dept: PERIOP | Facility: HOSPITAL | Age: 79
DRG: 623 | End: 2024-02-28
Payer: COMMERCIAL

## 2024-02-28 LAB
ALBUMIN SERPL BCP-MCNC: 3.7 G/DL (ref 3.5–5)
ALP SERPL-CCNC: 54 U/L (ref 34–104)
ALT SERPL W P-5'-P-CCNC: 4 U/L (ref 7–52)
ANION GAP SERPL CALCULATED.3IONS-SCNC: 8 MMOL/L
AST SERPL W P-5'-P-CCNC: 14 U/L (ref 13–39)
BASOPHILS # BLD AUTO: 0.03 THOUSANDS/ÂΜL (ref 0–0.1)
BASOPHILS NFR BLD AUTO: 0 % (ref 0–1)
BILIRUB SERPL-MCNC: 0.27 MG/DL (ref 0.2–1)
BUN SERPL-MCNC: 17 MG/DL (ref 5–25)
CALCIUM SERPL-MCNC: 10.1 MG/DL (ref 8.4–10.2)
CHLORIDE SERPL-SCNC: 101 MMOL/L (ref 96–108)
CO2 SERPL-SCNC: 31 MMOL/L (ref 21–32)
CREAT SERPL-MCNC: 0.61 MG/DL (ref 0.6–1.3)
EOSINOPHIL # BLD AUTO: 0.99 THOUSAND/ÂΜL (ref 0–0.61)
EOSINOPHIL NFR BLD AUTO: 12 % (ref 0–6)
ERYTHROCYTE [DISTWIDTH] IN BLOOD BY AUTOMATED COUNT: 15.3 % (ref 11.6–15.1)
EST. AVERAGE GLUCOSE BLD GHB EST-MCNC: 126 MG/DL
GFR SERPL CREATININE-BSD FRML MDRD: 87 ML/MIN/1.73SQ M
GLUCOSE SERPL-MCNC: 104 MG/DL (ref 65–140)
GLUCOSE SERPL-MCNC: 108 MG/DL (ref 65–140)
GLUCOSE SERPL-MCNC: 125 MG/DL (ref 65–140)
GLUCOSE SERPL-MCNC: 128 MG/DL (ref 65–140)
GLUCOSE SERPL-MCNC: 147 MG/DL (ref 65–140)
GLUCOSE SERPL-MCNC: 171 MG/DL (ref 65–140)
HBA1C MFR BLD: 6 %
HCT VFR BLD AUTO: 36.8 % (ref 34.8–46.1)
HGB BLD-MCNC: 11.3 G/DL (ref 11.5–15.4)
IMM GRANULOCYTES # BLD AUTO: 0.03 THOUSAND/UL (ref 0–0.2)
IMM GRANULOCYTES NFR BLD AUTO: 0 % (ref 0–2)
LYMPHOCYTES # BLD AUTO: 1.95 THOUSANDS/ÂΜL (ref 0.6–4.47)
LYMPHOCYTES NFR BLD AUTO: 23 % (ref 14–44)
MAGNESIUM SERPL-MCNC: 1.6 MG/DL (ref 1.9–2.7)
MCH RBC QN AUTO: 30.7 PG (ref 26.8–34.3)
MCHC RBC AUTO-ENTMCNC: 30.7 G/DL (ref 31.4–37.4)
MCV RBC AUTO: 100 FL (ref 82–98)
METHYLMALONATE SERPL-SCNC: 571 NMOL/L (ref 0–378)
MONOCYTES # BLD AUTO: 0.46 THOUSAND/ÂΜL (ref 0.17–1.22)
MONOCYTES NFR BLD AUTO: 6 % (ref 4–12)
NEUTROPHILS # BLD AUTO: 4.96 THOUSANDS/ÂΜL (ref 1.85–7.62)
NEUTS SEG NFR BLD AUTO: 59 % (ref 43–75)
NRBC BLD AUTO-RTO: 0 /100 WBCS
PLATELET # BLD AUTO: 308 THOUSANDS/UL (ref 149–390)
PMV BLD AUTO: 9.5 FL (ref 8.9–12.7)
POTASSIUM SERPL-SCNC: 4.1 MMOL/L (ref 3.5–5.3)
PROT SERPL-MCNC: 6 G/DL (ref 6.4–8.4)
RBC # BLD AUTO: 3.68 MILLION/UL (ref 3.81–5.12)
SODIUM SERPL-SCNC: 140 MMOL/L (ref 135–147)
WBC # BLD AUTO: 8.42 THOUSAND/UL (ref 4.31–10.16)

## 2024-02-28 PROCEDURE — 83036 HEMOGLOBIN GLYCOSYLATED A1C: CPT | Performed by: INTERNAL MEDICINE

## 2024-02-28 PROCEDURE — 80053 COMPREHEN METABOLIC PANEL: CPT | Performed by: INTERNAL MEDICINE

## 2024-02-28 PROCEDURE — 99024 POSTOP FOLLOW-UP VISIT: CPT | Performed by: SURGERY

## 2024-02-28 PROCEDURE — 83735 ASSAY OF MAGNESIUM: CPT | Performed by: INTERNAL MEDICINE

## 2024-02-28 PROCEDURE — 99232 SBSQ HOSP IP/OBS MODERATE 35: CPT | Performed by: FAMILY MEDICINE

## 2024-02-28 PROCEDURE — 0HRMX74 REPLACEMENT OF RIGHT FOOT SKIN WITH AUTOLOGOUS TISSUE SUBSTITUTE, PARTIAL THICKNESS, EXTERNAL APPROACH: ICD-10-PCS | Performed by: PODIATRIST

## 2024-02-28 PROCEDURE — 85025 COMPLETE CBC W/AUTO DIFF WBC: CPT | Performed by: INTERNAL MEDICINE

## 2024-02-28 PROCEDURE — 0HBKXZZ EXCISION OF RIGHT LOWER LEG SKIN, EXTERNAL APPROACH: ICD-10-PCS | Performed by: PODIATRIST

## 2024-02-28 PROCEDURE — 82948 REAGENT STRIP/BLOOD GLUCOSE: CPT

## 2024-02-28 RX ORDER — MAGNESIUM HYDROXIDE 1200 MG/15ML
LIQUID ORAL AS NEEDED
Status: DISCONTINUED | OUTPATIENT
Start: 2024-02-28 | End: 2024-02-28 | Stop reason: HOSPADM

## 2024-02-28 RX ORDER — FENTANYL CITRATE/PF 50 MCG/ML
25 SYRINGE (ML) INJECTION
Status: DISCONTINUED | OUTPATIENT
Start: 2024-02-28 | End: 2024-02-28

## 2024-02-28 RX ORDER — MAGNESIUM SULFATE HEPTAHYDRATE 40 MG/ML
2 INJECTION, SOLUTION INTRAVENOUS ONCE
Status: COMPLETED | OUTPATIENT
Start: 2024-02-28 | End: 2024-02-28

## 2024-02-28 RX ORDER — LIDOCAINE HYDROCHLORIDE 10 MG/ML
INJECTION, SOLUTION EPIDURAL; INFILTRATION; INTRACAUDAL; PERINEURAL AS NEEDED
Status: DISCONTINUED | OUTPATIENT
Start: 2024-02-28 | End: 2024-02-28

## 2024-02-28 RX ORDER — GLYCOPYRROLATE 0.2 MG/ML
INJECTION INTRAMUSCULAR; INTRAVENOUS AS NEEDED
Status: DISCONTINUED | OUTPATIENT
Start: 2024-02-28 | End: 2024-02-28

## 2024-02-28 RX ORDER — LIDOCAINE HYDROCHLORIDE AND EPINEPHRINE 10; 10 MG/ML; UG/ML
INJECTION, SOLUTION INFILTRATION; PERINEURAL AS NEEDED
Status: DISCONTINUED | OUTPATIENT
Start: 2024-02-28 | End: 2024-02-28 | Stop reason: HOSPADM

## 2024-02-28 RX ORDER — MINERAL OIL
OIL (ML) MISCELLANEOUS AS NEEDED
Status: DISCONTINUED | OUTPATIENT
Start: 2024-02-28 | End: 2024-02-28 | Stop reason: HOSPADM

## 2024-02-28 RX ORDER — SODIUM CHLORIDE, SODIUM LACTATE, POTASSIUM CHLORIDE, CALCIUM CHLORIDE 600; 310; 30; 20 MG/100ML; MG/100ML; MG/100ML; MG/100ML
INJECTION, SOLUTION INTRAVENOUS CONTINUOUS PRN
Status: DISCONTINUED | OUTPATIENT
Start: 2024-02-28 | End: 2024-02-28

## 2024-02-28 RX ORDER — PROPOFOL 10 MG/ML
INJECTION, EMULSION INTRAVENOUS AS NEEDED
Status: DISCONTINUED | OUTPATIENT
Start: 2024-02-28 | End: 2024-02-28

## 2024-02-28 RX ORDER — FENTANYL CITRATE 50 UG/ML
INJECTION, SOLUTION INTRAMUSCULAR; INTRAVENOUS AS NEEDED
Status: DISCONTINUED | OUTPATIENT
Start: 2024-02-28 | End: 2024-02-28

## 2024-02-28 RX ADMIN — FENTANYL CITRATE 25 MCG: 50 INJECTION INTRAMUSCULAR; INTRAVENOUS at 14:24

## 2024-02-28 RX ADMIN — GLYCOPYRROLATE 0.2 MG: 0.2 INJECTION, SOLUTION INTRAMUSCULAR; INTRAVENOUS at 14:24

## 2024-02-28 RX ADMIN — PREGABALIN 200 MG: 100 CAPSULE ORAL at 17:09

## 2024-02-28 RX ADMIN — SENNOSIDES, DOCUSATE SODIUM 1 TABLET: 8.6; 5 TABLET ORAL at 08:13

## 2024-02-28 RX ADMIN — PREGABALIN 200 MG: 100 CAPSULE ORAL at 08:14

## 2024-02-28 RX ADMIN — CEFPODOXIME PROXETIL 400 MG: 200 TABLET, FILM COATED ORAL at 17:09

## 2024-02-28 RX ADMIN — LIDOCAINE HYDROCHLORIDE 50 MG: 10 INJECTION, SOLUTION EPIDURAL; INFILTRATION; INTRACAUDAL; PERINEURAL at 14:24

## 2024-02-28 RX ADMIN — PANTOPRAZOLE SODIUM 40 MG: 40 TABLET, DELAYED RELEASE ORAL at 05:14

## 2024-02-28 RX ADMIN — PREGABALIN 200 MG: 100 CAPSULE ORAL at 22:32

## 2024-02-28 RX ADMIN — NICOTINE 1 PATCH: 14 PATCH, EXTENDED RELEASE TRANSDERMAL at 08:13

## 2024-02-28 RX ADMIN — ASPIRIN 81 MG CHEWABLE TABLET 81 MG: 81 TABLET CHEWABLE at 08:13

## 2024-02-28 RX ADMIN — ACETAMINOPHEN 975 MG: 325 TABLET, FILM COATED ORAL at 05:15

## 2024-02-28 RX ADMIN — CYANOCOBALAMIN TAB 500 MCG 1000 MCG: 500 TAB at 08:13

## 2024-02-28 RX ADMIN — METHOCARBAMOL 250 MG: 500 TABLET ORAL at 22:32

## 2024-02-28 RX ADMIN — MAGNESIUM SULFATE HEPTAHYDRATE 2 G: 40 INJECTION, SOLUTION INTRAVENOUS at 08:41

## 2024-02-28 RX ADMIN — SODIUM CHLORIDE, SODIUM LACTATE, POTASSIUM CHLORIDE, AND CALCIUM CHLORIDE: .6; .31; .03; .02 INJECTION, SOLUTION INTRAVENOUS at 14:15

## 2024-02-28 RX ADMIN — SENNOSIDES, DOCUSATE SODIUM 1 TABLET: 8.6; 5 TABLET ORAL at 17:08

## 2024-02-28 RX ADMIN — CEFPODOXIME PROXETIL 400 MG: 200 TABLET, FILM COATED ORAL at 08:14

## 2024-02-28 RX ADMIN — PROPOFOL 100 MCG/KG/MIN: 10 INJECTION, EMULSION INTRAVENOUS at 14:25

## 2024-02-28 RX ADMIN — METHOCARBAMOL 250 MG: 500 TABLET ORAL at 05:14

## 2024-02-28 RX ADMIN — BUPROPION HYDROCHLORIDE 300 MG: 150 TABLET, FILM COATED, EXTENDED RELEASE ORAL at 08:13

## 2024-02-28 RX ADMIN — CLONAZEPAM 1 MG: 1 TABLET ORAL at 17:09

## 2024-02-28 RX ADMIN — OMEGA-3 FATTY ACIDS CAP 1000 MG 1000 MG: 1000 CAP at 08:14

## 2024-02-28 RX ADMIN — RIVAROXABAN 2.5 MG: 2.5 TABLET, FILM COATED ORAL at 17:57

## 2024-02-28 RX ADMIN — PROPOFOL 30 MG: 10 INJECTION, EMULSION INTRAVENOUS at 14:24

## 2024-02-28 RX ADMIN — ACETAMINOPHEN 975 MG: 325 TABLET, FILM COATED ORAL at 22:31

## 2024-02-28 RX ADMIN — Medication 1 TABLET: at 08:13

## 2024-02-28 RX ADMIN — INSULIN LISPRO 1 UNITS: 100 INJECTION, SOLUTION INTRAVENOUS; SUBCUTANEOUS at 22:31

## 2024-02-28 RX ADMIN — OXYCODONE HYDROCHLORIDE 5 MG: 5 TABLET ORAL at 17:11

## 2024-02-28 RX ADMIN — FLUTICASONE FUROATE AND VILANTEROL TRIFENATATE 1 PUFF: 200; 25 POWDER RESPIRATORY (INHALATION) at 08:40

## 2024-02-28 NOTE — ASSESSMENT & PLAN NOTE
Xarelto held for STSG per podiatry - restart when cleared by podiatry  Ct ASA, statin  Vascular managing right groin wound

## 2024-02-28 NOTE — PROGRESS NOTES
"Podiatry - Progress Note  Patient: Lona Cedeno 78 y.o. female   MRN: [id number]  PCP: Vivek Preston DO  Unit/Bed#: NW8 876-01 Encounter: [id number]  Date Of Visit: 24    ASSESSMENT:    Lona Cedeno is a 78 y.o. female with:    Right posterior ankle ulcer with exposed achilles tendon  - Right wound debridement (DOS: 24)  Right medial ankle ulcer, limited to breakdown of skin  Osteomyelitis of right second toe  - Right 2nd toe amputation (DOS: 24)  T2DM  PAD  Tobacco use disorder  Diabetic polyneuropathy      PLAN:    Patient to go to OR today,24, for right split thickness skin graft application and right wound debridement with possible wound vac with Dr. Yeboah.  Consent signed and scanned into media in the chart.   Confirmed NPO status.  H&P, vitals, and current labs reviewed.  No acute changes noted.  Alternatives, risks, and complications discussed with patient.  All questions answered.  No guarantees given of outcome.  Rest of medical care per primary team.       SUBJECTIVE:     The patient was seen, evaluated, and assessed at bedside today. The patient was awake, alert, and in no acute distress. Patient confirmed NPO status. All questions and concerns regarding the surgical procedure addressed. Patient understands risks vs benefits of procedure and remains amenable with plan for surgery today. Patient denies N/V/F/chills/SOB/CP.      OBJECTIVE:     Vitals:   /57   Pulse (!) 52   Temp 97.9 °F (36.6 °C) (Oral)   Resp 16   Ht 5' 1\" (1.549 m)   Wt 69.4 kg (153 lb)   SpO2 98%   BMI 28.91 kg/m²     Temp (24hrs), Av.9 °F (36.6 °C), Min:97.6 °F (36.4 °C), Max:98.3 °F (36.8 °C)      Physical Exam:     General:  Alert, cooperative, and in no distress.  Lower extremity exam:  Cardiovascular status at baseline.  Neurological status at baseline.  Musculoskeletal status at baseline. No calf tenderness noted bilaterally. Dressing left intact to the Operating Room. " "    Additional Data:     Labs:    Results from last 7 days   Lab Units 02/28/24  0320   WBC Thousand/uL 8.42   HEMOGLOBIN g/dL 11.3*   HEMATOCRIT % 36.8   PLATELETS Thousands/uL 308   NEUTROS PCT % 59   LYMPHS PCT % 23   MONOS PCT % 6   EOS PCT % 12*     Results from last 7 days   Lab Units 02/28/24  0340   POTASSIUM mmol/L 4.1   CHLORIDE mmol/L 101   CO2 mmol/L 31   BUN mg/dL 17   CREATININE mg/dL 0.61   CALCIUM mg/dL 10.1   ALK PHOS U/L 54   ALT U/L 4*   AST U/L 14           * I Have Reviewed All Lab Data Listed Above.    Recent Cultures (last 7 days):               Imaging: I have personally reviewed pertinent films in PACS  EKG, Pathology, and Other Studies: I have personally reviewed pertinent reports.    ** Please Note: Portions of the record may have been created with voice recognition software. Occasional wrong word or \"sound a like\" substitutions may have occurred due to the inherent limitations of voice recognition software. Read the chart carefully and recognize, using context, where substitutions have occurred. **      "

## 2024-02-28 NOTE — ASSESSMENT & PLAN NOTE
Lab Results   Component Value Date    HGBA1C 6.5 (H) 11/07/2023       Recent Labs     02/26/24  1553 02/26/24  2104 02/27/24  0624 02/27/24  1037   POCGLU 99 115 118 113       Blood Sugar Average: Last 72 hrs:    Home regimen: Januvia 1000 mg daily, Metformin 1000 mg daily  Home meds held  SSI  Lyrica for neuropathic pain  Monitor blood sugars and adjust insulin accordingly

## 2024-02-28 NOTE — UTILIZATION REVIEW
Initial Clinical Review    Admission: Date/Time/Statement:   Admission Orders (From admission, onward)       Ordered        02/27/24 1630  Inpatient Admission  Once                          Orders Placed This Encounter   Procedures    Inpatient Admission     Standing Status:   Standing     Number of Occurrences:   1     Order Specific Question:   Level of Care     Answer:   Med Surg [16]     Order Specific Question:   Estimated length of stay     Answer:   More than 2 Midnights     Order Specific Question:   Certification     Answer:   I certify that inpatient services are medically necessary for this patient for a duration of greater than two midnights. See H&P and MD Progress Notes for additional information about the patient's course of treatment.     ED Arrival Information       Patient not seen in ED                       No chief complaint on file.      Initial Presentation: 78 y.o. female w/ PMH of PAD, T2DM, HTN, tobacco abuse, COPD, GERD, anxiety, depression, direct admitted as Inpatient from [facility] for STSG for right posterior ankle ulcer by podiatry on 2/28/2024.   Pt was admitted to [facility] following hospitalization from 2/13/2024 to 2/23/2024 for surgical site infection of her right groin.    Plan: cont VAC dressing on the right groin. Cont Cefpodoxime 400 mg twice daily through 3/22 per ID. Xarelto held. Cont ASA, statin.     Date: 02/28   Day 2:   Vascular Notes: Left groin wound vac removed by vascular team, well granulated wound bed noted with minimal depth, no undermining or tunneling, no further indication for negative pressure therapy. Recommend wound care consult for continued management. Continue ASA, statin, Xarelto (unless pending procedural intervention)     Podiatry Notes: Patient to go to OR today,02/28/24, for right split thickness skin graft application and right wound debridement with possible wound vac. NPO.     OP Note:  SURGERY DATE: 2/28/2024   Procedure(s) (LRB):  SKIN GRAFT SPLIT  THICKNESS (STSG)  EXTREMITY, Wound vac dressing change (Right)  Anesthesia Type: Choice with 10 ml of 1% Lidocaine   Operative Findings:  -Split thickness skin graft to right lower extremity with wound vac application   -Donor site with pinpoint healthy bleeding  -Viable flap with healthy bleeding, granular wound base at right posterior and medial ankle    Date: 02/29    Day 3: Has surpassed a 2nd midnight with active treatments and services, which include: cont serial labs. Mon flap. Mon wound vac output, dressing change. Cont ASA, statin. Cont Vantin. Restarted Xarelto. Cont pain control.    ED Triage Vitals   Temperature Pulse Respirations Blood Pressure SpO2   02/27/24 1627 02/27/24 1627 02/27/24 1627 02/27/24 1627 02/27/24 1627   97.7 °F (36.5 °C) 76 18 119/57 94 %      Temp Source Heart Rate Source Patient Position - Orthostatic VS BP Location FiO2 (%)   02/27/24 1627 02/27/24 2300 02/27/24 1627 02/27/24 1627 --   Axillary Monitor Lying Left arm       Pain Score       02/27/24 1828       No Pain          Wt Readings from Last 1 Encounters:   02/27/24 69.4 kg (153 lb)     Additional Vital Signs:   Date/Time Temp Pulse Resp BP MAP (mmHg) SpO2 Calculated FIO2 (%) - Nasal Cannula Nasal Cannula O2 Flow Rate (L/min) O2 Device Cardiac (WDL) Patient Position - Orthostatic VS   02/28/24 1633 97.5 °F (36.4 °C) 73 -- 121/51 -- -- -- -- -- -- Sitting   02/28/24 1545 -- 72 16 -- -- 94 % -- -- -- -- --   02/28/24 1530 97.5 °F (36.4 °C) 74 13 108/65 86 93 % -- -- None (Room air) X --   02/28/24 1515 97.6 °F (36.4 °C) 75 16 112/52 73 96 % -- -- None (Room air) X --   02/28/24 0715 97.9 °F (36.6 °C) 52 Abnormal  -- 111/57 -- -- -- -- -- -- --   02/28/24 0500 -- -- -- -- -- -- 28 2 L/min Nasal cannula -- --   02/27/24 2300 97.6 °F (36.4 °C) 79 16 98/62 68 98 % -- -- None (Room air) -- Lying   02/27/24 2201 -- -- -- -- -- -- -- -- None (Room air) -- --   02/27/24 1836 98.3 °F (36.8 °C) 84 18 136/75 -- -- -- -- -- -- --    02/27/24 1655 98 °F (36.7 °C) 84 18 136/75 98 96 % -- -- -- -- Lying       Pertinent Labs/Diagnostic Test Results:   No orders to display         Results from last 7 days   Lab Units 02/28/24  0320 02/23/24  0553   WBC Thousand/uL 8.42 7.99   HEMOGLOBIN g/dL 11.3* 10.9*   HEMATOCRIT % 36.8 35.3   PLATELETS Thousands/uL 308 315   NEUTROS ABS Thousands/µL 4.96 4.67         Results from last 7 days   Lab Units 02/28/24  0340 02/23/24  0553   SODIUM mmol/L 140 137   POTASSIUM mmol/L 4.1 3.9   CHLORIDE mmol/L 101 100   CO2 mmol/L 31 30   ANION GAP mmol/L 8 7   BUN mg/dL 17 17   CREATININE mg/dL 0.61 0.59*   EGFR ml/min/1.73sq m 87 88   CALCIUM mg/dL 10.1 9.4   MAGNESIUM mg/dL 1.6*  --      Results from last 7 days   Lab Units 02/28/24  0340   AST U/L 14   ALT U/L 4*   ALK PHOS U/L 54   TOTAL PROTEIN g/dL 6.0*   ALBUMIN g/dL 3.7   TOTAL BILIRUBIN mg/dL 0.27     Results from last 7 days   Lab Units 02/28/24  1600 02/28/24  1519 02/28/24  1129 02/28/24  0735 02/27/24  2055 02/27/24  1037 02/27/24  0624 02/26/24  2104 02/26/24  1553 02/26/24  1101 02/26/24  0630 02/25/24  2043   POC GLUCOSE mg/dl 104 108 125 147* 185* 113 118 115 99 203* 141* 129     Results from last 7 days   Lab Units 02/28/24  0340 02/23/24  0553   GLUCOSE RANDOM mg/dL 128 160*         Results from last 7 days   Lab Units 02/28/24  0438   HEMOGLOBIN A1C % 6.0*   EAG mg/dl 126       ED Treatment:   Medication Administration - No Administrations Displayed (No Start Event Found)       None          Past Medical History:   Diagnosis Date    Anxiety     Closed fracture of coccyx (HCC) 05/24/2023    Diabetes mellitus (HCC)     GERD (gastroesophageal reflux disease)     Hyperlipidemia     Hypertension     IBS (irritable bowel syndrome)     Shoulder fracture      Present on Admission:   Diabetic foot ulcer with osteomyelitis (HCC)   Surgical site infection   PAD (peripheral artery disease) (HCC)   Type 2 diabetes mellitus with diabetic polyneuropathy (HCC)    Tobacco use disorder   GERD without esophagitis   Anxiety and depression   COPD, severity to be determined (HCC)   Sacral wound   Hypomagnesemia      Admitting Diagnosis: Surgical site infection [T81.49XA]  Age/Sex: 78 y.o. female  Admission Orders:  SCD  PT/OT    Scheduled Medications:  acetaminophen, 975 mg, Oral, Q8H JILLIAN  aspirin, 81 mg, Oral, Daily  buPROPion, 300 mg, Oral, Daily  cefpodoxime, 400 mg, Oral, BID With Meals  clonazePAM, 1 mg, Oral, QPM  cyanocobalamin, 1,000 mcg, Oral, Daily  fish oil, 1,000 mg, Oral, Daily  fluticasone-vilanterol, 1 puff, Inhalation, Daily  insulin lispro, 1-5 Units, Subcutaneous, TID AC  insulin lispro, 1-5 Units, Subcutaneous, HS  methocarbamol, 250 mg, Oral, Q8H JILLIAN  multivitamin-minerals, 1 tablet, Oral, Daily  nicotine, 1 patch, Transdermal, Daily  pantoprazole, 40 mg, Oral, Early Morning  polyethylene glycol, 17 g, Oral, Daily  pregabalin, 200 mg, Oral, TID  rivaroxaban, 2.5 mg, Oral, BID  senna-docusate sodium, 1 tablet, Oral, BID      Continuous IV Infusions: none     PRN Meds:  albuterol, 2 puff, Inhalation, Q6H PRN  oxyCODONE, 2.5 mg, Oral, Q4H PRN   Or  oxyCODONE, 5 mg, Oral, Q4H PRN        None    Network Utilization Review Department  ATTENTION: Please call with any questions or concerns to [phone] and carefully listen to the prompts so that you are directed to the right person. All voicemails are confidential.   For Discharge needs, contact Care Management DC Support Team at [phone] opt. [phone]  Send all requests for admission clinical reviews, approved or denied determinations and any other requests to dedicated fax number below belonging to the campus where the patient is receiving treatment. List of dedicated fax numbers for the Facilities:  FACILITY NAME UR FAX NUMBER   ADMISSION DENIALS (Administrative/Medical Necessity) [phone]   DISCHARGE SUPPORT TEAM (NETWORK) [phone]   PARENT CHILD HEALTH (Maternity/NICU/Pediatrics) [phone]   [facility]  [facility] [phone]   [facility] [phone]   [facility] [phone]   [facility] [phone]   [facility] [phone]   [facility] [phone]   [facility] [phone]   [facility] [phone]   [facility] [phone]   [facility] [phone]   [facility] [phone]   [facility] [phone]

## 2024-02-28 NOTE — PROGRESS NOTES
[facility]  Progress Note  Name: Lona Cedeno I  MRN: [id number]  Unit/Bed#: NW8 876-01 I Date of Admission: 2/27/2024   Date of Service: 2/28/2024  Hospital Day: 1    Assessment/Plan   * Diabetic foot ulcer with osteomyelitis (HCC)  Assessment & Plan  Right posterior ankle ulcer with exposed Achilles tendon s/p wound debridement on 2/4/24  Osteomyelitis of right second toe s/p amputation on 2/4/24  Transferred from [facility] for STSG planned for 2/28/24 by podiatry for right posterior ankle wound      (P) 166      Sacral wound  Assessment & Plan  Noted to have unstageable wound on the sacrum on initial admission  Continue wound care    Surgical site infection  Assessment & Plan  Hospitalized from 2/13/24 to 2/23/24 for right groin surgical wound infection from previous RLE bypass  S/p debridement on 2/14/2024 and washout on 2/19/2024  Wound cultures - Klebsiella and Proteus  S/p removal of vac by vascular surgery on 2/28  ID recommended continuation of antibiotics through 3/22/2024 due to proximity of wound infection to the bypass graft - on Cefpodoxime 400 mg twice daily    Tobacco use disorder  Assessment & Plan  Ct nicotine patch  Smoking cessation advised    COPD, severity to be determined ([facility])  Assessment & Plan  No exacerbation  Home Advair replaced by equivalent formulary Breo  Continue albuterol as needed    PAD (peripheral artery disease) ([facility])  Assessment & Plan  Xarelto held for STSG per podiatry - restart when cleared by podiatry  Ct ASA, statin  Vascular managing right groin wound-right groing wound vac removed and recs for wound care     Hypomagnesemia  Assessment & Plan  Replace and recheck in am     GERD without esophagitis  Assessment & Plan  Ct PPI    Anxiety and depression  Assessment & Plan  Ct Wellbutrin  mg daily, Clonazepam 1 mg daily    Type 2 diabetes mellitus with diabetic polyneuropathy ([facility])  Assessment & Plan  Lab Results   Component Value Date     HGBA1C 6.0 (H) 2024       Recent Labs     24  0624 24  1037 24  2055 24  0735   POCGLU 118 113 185* 147*         Blood Sugar Average: Last 72 hrs:  (P) 166  Home regimen: Januvia 1000 mg daily, Metformin 1000 mg daily  Home meds held  SSI  Lyrica for neuropathic pain  Monitor blood sugars and adjust insulin accordingly                 VTE Pharmacologic Prophylaxis: VTE Score: 4 Moderate Risk (Score 3-4) - Pharmacological DVT Prophylaxis Contraindicated. Sequential Compression Devices Ordered.    Mobility:   Basic Mobility Inpatient Raw Score: 14  JH-HLM Goal: 4: Move to chair/commode  JH-HLM Achieved: 4: Move to chair/commode  HLM Goal NOT achieved. Continue with multidisciplinary rounding and encourage appropriate mobility to improve upon HLM goals.    Patient Centered Rounds: I performed bedside rounds with nursing staff today.   Discussions with Specialists or Other Care Team Provider: will discuss with podiatr    Education and Discussions with Family / Patient: pt    Total Time Spent on Date of Encounter in care of patient: >35 mins. This time was spent on one or more of the following: performing physical exam; counseling and coordination of care; obtaining or reviewing history; documenting in the medical record; reviewing/ordering tests, medications or procedures; communicating with other healthcare professionals and discussing with patient's family/caregivers.    Current Length of Stay: 1 day(s)  Current Patient Status: Inpatient   Certification Statement: The patient will continue to require additional inpatient hospital stay due to right ankle graft  Discharge Plan: Anticipate discharge in 48-72 hrs to discharge location to be determined pending rehab evaluations.    Code Status: Level 1 - Full Code    Subjective:   Seen and examined no complaints    Objective:     Vitals:   Temp (24hrs), Av.9 °F (36.6 °C), Min:97.6 °F (36.4 °C), Max:98.3 °F (36.8 °C)    Temp:  [97.6 °F  (36.4 °C)-98.3 °F (36.8 °C)] 97.9 °F (36.6 °C)  HR:  [52-84] 52  Resp:  [16-18] 16  BP: ()/(56-75) 111/57  SpO2:  [94 %-98 %] 98 %  Body mass index is 28.91 kg/m².     Input and Output Summary (last 24 hours):     Intake/Output Summary (Last 24 hours) at 2/28/2024 1043  Last data filed at 2/27/2024 2001  Gross per 24 hour   Intake --   Output 45 ml   Net -45 ml       Physical Exam:   Physical Exam  Vitals and nursing note reviewed.   Constitutional:       General: She is not in acute distress.     Appearance: She is well-developed.   HENT:      Head: Normocephalic and atraumatic.   Eyes:      Conjunctiva/sclera: Conjunctivae normal.   Cardiovascular:      Rate and Rhythm: Normal rate and regular rhythm.      Heart sounds: No murmur heard.  Pulmonary:      Effort: Pulmonary effort is normal. No respiratory distress.      Breath sounds: Normal breath sounds. No wheezing or rales.   Abdominal:      General: There is no distension.      Palpations: Abdomen is soft.      Tenderness: There is no abdominal tenderness.   Musculoskeletal:         General: No swelling.      Cervical back: Neck supple.   Skin:     General: Skin is warm and dry.      Capillary Refill: Capillary refill takes less than 2 seconds.   Neurological:      Mental Status: She is alert and oriented to person, place, and time.   Psychiatric:         Mood and Affect: Mood normal.          Additional Data:     Labs:  Results from last 7 days   Lab Units 02/28/24  0320   WBC Thousand/uL 8.42   HEMOGLOBIN g/dL 11.3*   HEMATOCRIT % 36.8   PLATELETS Thousands/uL 308   NEUTROS PCT % 59   LYMPHS PCT % 23   MONOS PCT % 6   EOS PCT % 12*     Results from last 7 days   Lab Units 02/28/24  0340   SODIUM mmol/L 140   POTASSIUM mmol/L 4.1   CHLORIDE mmol/L 101   CO2 mmol/L 31   BUN mg/dL 17   CREATININE mg/dL 0.61   ANION GAP mmol/L 8   CALCIUM mg/dL 10.1   ALBUMIN g/dL 3.7   TOTAL BILIRUBIN mg/dL 0.27   ALK PHOS U/L 54   ALT U/L 4*   AST U/L 14   GLUCOSE RANDOM  mg/dL 128         Results from last 7 days   Lab Units 02/28/24  0735 02/27/24  2055 02/27/24  1037 02/27/24  0624 02/26/24  2104 02/26/24  1553 02/26/24  1101 02/26/24  0630 02/25/24  2043 02/25/24  1606 02/25/24  1053 02/25/24  0546   POC GLUCOSE mg/dl 147* 185* 113 118 115 99 203* 141* 129 105 163* 155*     Results from last 7 days   Lab Units 02/28/24  0438   HEMOGLOBIN A1C % 6.0*           Lines/Drains:  Invasive Devices       Peripheral Intravenous Line  Duration             Peripheral IV 02/27/24 Left;Ventral (anterior) Forearm <1 day              Drain  Duration             Ureteral Internal Stent Left ureter 6 Fr. 100 days                          Imaging: No pertinent imaging reviewed.    Recent Cultures (last 7 days):         Last 24 Hours Medication List:   Current Facility-Administered Medications   Medication Dose Route Frequency Provider Last Rate    acetaminophen  975 mg Oral Q8H [facility] Sandra Shaw MD      albuterol  2 puff Inhalation Q6H PRN Sandra Shaw MD      aspirin  81 mg Oral Daily Sandra Shaw MD      buPROPion  300 mg Oral Daily Sandra Shaw MD      cefpodoxime  400 mg Oral BID With Meals Sandra Shaw MD      clonazePAM  1 mg Oral QPM Sandra Shaw MD      cyanocobalamin  1,000 mcg Oral Daily Sandra Shaw MD      fish oil  1,000 mg Oral Daily Sandra Shaw MD      fluticasone-vilanterol  1 puff Inhalation Daily Sandra Shaw MD      heparin (porcine)  5,000 Units Subcutaneous Q8H [facility] Sandra Shaw MD      insulin lispro  1-5 Units Subcutaneous TID AC Sandra Shaw MD      insulin lispro  1-5 Units Subcutaneous HS Sandra Shaw MD      methocarbamol  250 mg Oral Q8H JILLIAN Sandra Shaw MD      multivitamin-minerals  1 tablet Oral Daily Sandra Shaw MD      nicotine  1 patch Transdermal Daily Sandra Shaw MD      oxyCODONE  2.5 mg Oral Q4H PRN Sandra Shaw MD      Or    oxyCODONE  5 mg Oral Q4H PRN Sandra Shaw MD       pantoprazole  40 mg Oral Early Morning Sandra Shaw MD      polyethylene glycol  17 g Oral Daily Sandra Shaw MD      pregabalin  200 mg Oral TID Sandra Shaw MD      senna-docusate sodium  1 tablet Oral BID Sandra Shaw MD          Today, Patient Was Seen By: Kavya Lang MD    **Please Note: This note may have been constructed using a voice recognition system.**

## 2024-02-28 NOTE — OP NOTE
OPERATIVE REPORT - Podiatry  PATIENT NAME: Lona Cedeno    :  1945  MRN: [id number]  Pt Location: BE OR ROOM 06    SURGERY DATE: 2024    Surgeons and Role:     * Rigo Yeboah DPM - Primary     * Mary Whyte DPM - Assisting    Pre-op Diagnosis:  Ankle ulcer, right, with fat layer exposed (HCC) [L97.312]    Post-Op Diagnosis Codes:     * Ankle ulcer, right, with fat layer exposed (HCC) [L97.312]    Procedure(s) (LRB):  SKIN GRAFT SPLIT THICKNESS (STSG)  EXTREMITY, Wound vac dressing change (Right)    Specimen(s):  * No specimens in log *    Estimated Blood Loss:   Minimal    Drains:  Ureteral Internal Stent Left ureter 6 Fr. (Active)   Number of days: 100       Anesthesia Type:   Choice with 10 ml of 1% Lidocaine     Hemostasis:  Direct compression    Materials:  * No implants in log *  Staples    Injectables:  9 ml of 1% Lidocaine with Epinephrine     Operative Findings:  -Split thickness skin graft to right lower extremity with wound vac application   -Donor site with pinpoint healthy bleeding  -Viable flap with healthy bleeding, granular wound base at right posterior and medial ankle     Complications:   None    Procedure and Technique:  Under mild sedation, the patient was brought into the operating room and place in the supine position. Choice of sedation per anesthesia team was achieved and a universal timeout was performed where all parties are in agreement of correct patient, correct procedure and correct site.     Following intravenous sedation, a regional block was performed consisting of 10 ml of 1% Lidocaine plain.    The lower extremity was then prepped and draped in the usual aseptic manner.     Procedure: Wound bed preparation  Attention was directed to the wound areas at the right posterior and medial ankle   The wound base underwent debridement down to healthy bleeding granulation tissue and irrigated with normal sterile saline.    Procedure: Elevation and inset of  split thickness skin graft  Attention was then directed to the donor site at right medial calf.   A regional block was performed consisting of 9 ml of 1% Lidocaine with epinephrine 1:100,000.  The donor site was prepped with sterile mineral oil.   A Miranda electric dermatome was utilized to harvest a split thickness skin graft of 0.015 inch thickness from the ipsilateral lower limb.   Graft was meshed in 2:1.   Punctate bleeding was noted at the donor site and this was controlled with pressure.     The skin graft was meshed on a skin expander and roller apparatus and the meshed graft was placed on the wound and secured with skin staples.   The donor site was dressed with a silver alginate dressing and tegaderm.  The skin graft was covered with a wound veil, steri strips, wound vacuum set to 125 mmHg low continuous.     A wound vacuum was applied at 125 mmHg. Seal noted to be intact without leakage.    Wound bed dimension for the posterior ankle wound: pre (6.0 x 3.0 x 0.1 cm) & post (6.0 x 3.0 x 0.1 cm)  Wound bed dimension for the medial ankle wound: pre (2.5 x 1.0 x 0.1 cm) & post (2.5 x 1.0 x 0.1 cm)  Skin graft dimension: 7.0 x 4.0 cm.    Wound dressing to be left in place for the next five days.  Donor site dressing to be left in place for the next five days.    The patient was instructed to leave the dressing clean, dry and intact and to reinforce without removing if strikethrough drainage was noted.     The patient tolerated the procedure and anesthesia well without immediate complications and transferred to PACU with vital signs stable.    As with many limb salvage procedures, we contemplate the possibility of performing further stages to this procedure. Procedures may include debridements, delayed closure, plastic surgery techniques, or more proximal amputations. This procedure may be considered part of a multi-staged limb salvage treatment plan.     Dr. Yeboah was present during the entire  "procedure and participated in all key aspects.    SIGNATURE: Mary Whyte DPM  DATE: February 28, 2024  TIME: 3:16 PM    Portions of the record may have been created with voice recognition software. Occasional wrong word or \"sound a like\" substitutions may have occurred due to the inherent limitations of voice recognition software. Read the chart carefully and recognize, using context, where substitutions have occurred.    "

## 2024-02-28 NOTE — ASSESSMENT & PLAN NOTE
Xarelto held for STSG per podiatry - restart when cleared by podiatry  Ct ASA, statin  Vascular managing right groin wound-right groing wound vac removed and recs for wound care

## 2024-02-28 NOTE — CASE MANAGEMENT
Case Management Assessment & Discharge Planning Note    Patient name Lona Cedeno  Location NW8 876/NW8 876-01 MRN [id number]  : 1945 Date 2024       Current Admission Date: 2024  Current Admission Diagnosis:Diabetic foot ulcer with osteomyelitis (HCC)   Patient Active Problem List    Diagnosis Date Noted    Sacral wound 2024    Impulsive 2024    Other dietary vitamin B12 deficiency anemia 2024    Leukocytosis 2024    Surgical site infection 2024    Sepsis without acute organ dysfunction (HCC) 2024    At risk for venous thromboembolism (VTE) 2024    At high risk for skin breakdown 2024    Wound of skin 2024    Impaired mobility and activities of daily living 2024    Acute pain due to injury 2024    Hematoma 2024    Anemia 2024    Constipation 2024    Preoperative cardiovascular examination 2024    Diabetic foot ulcer with osteomyelitis (HCC) 2024    Ankle ulcer, right, with fat layer exposed ([facility]) 2023    Age-related osteoporosis without current pathological fracture 2023    Abnormal CT of the chest 10/17/2023    COPD, severity to be determined ([facility]) 10/17/2023    Tobacco use disorder 10/17/2023    PAD (peripheral artery disease) ([facility]) 10/10/2023    Bladder neoplasm 2023    Left renal mass 2023    Lactic acidosis 2023    Hypomagnesemia 2023    Degenerative lumbar disc 2023    Urinary incontinence 2023    GERD without esophagitis 10/16/2019    Essential hypertension 02/15/2019    Anxiety and depression 02/15/2019    Type 2 diabetes mellitus with diabetic polyneuropathy (HCC) 2019      LOS (days): 1  Geometric Mean LOS (GMLOS) (days): 3  Days to GMLOS:2     OBJECTIVE:    Risk of Unplanned Readmission Score: 23.16         Current admission status: Inpatient       Preferred Pharmacy:   [id number] - [address] [facility] [address]  [address] [place] [id number]  Phone: [phone] Fax: [phone]    [facility] [place], [place] - [address]  [address] [place] [address]  Phone: [phone] Fax: [phone]    Primary Care Provider: Vivek Preston DO    Primary Insurance: [employer] [facility] REP  Secondary Insurance:     ASSESSMENT:  Active Health Care Proxies       Carmelo Cedeno Health Care Representative - Spouse   Primary Phone: [phone] (Home)                 Advance Directives  Does patient have a Health Care POA?: Yes  Does patient have Advance Directives?: Yes  Advance Directives: Power of  for health care, Living will  Primary Contact: Carmelo Cedeno-spouse and Daughter-Ariela Feng      DISCHARGE DETAILS:    Discharge planning discussed with:: patient at OR procedure.  Spoue and Daughter at bedside.     Comments - Freedom of Choice: NACHO to [facility]  CM contacted family/caregiver?: Yes  Were Treatment Team discharge recommendations reviewed with patient/caregiver?: Yes  Did patient/caregiver verbalize understanding of patient care needs?: Yes  Were patient/caregiver advised of the risks associated with not following Treatment Team discharge recommendations?: Yes    Contacts  Patient Contacts: leopoldo Leal  Relationship to Patient:: Family  Contact Method: Phone  Phone Number: [phone]  Reason/Outcome: Discharge Planning, Continuity of Care, Emergency Contact    Other Referral/Resources/Interventions Provided:  Interventions: Acute Rehab     Patient information obtained from spouse and daughter in room, patient at an OR procedure. Patient from [facility].  CM to follow with d/c needs.

## 2024-02-28 NOTE — ANESTHESIA POSTPROCEDURE EVALUATION
Post-Op Assessment Note    CV Status:  Stable    Pain management: adequate       Mental Status:  Alert and awake   Hydration Status:  Euvolemic   PONV Controlled:  Controlled   Airway Patency:  Patent     Post Op Vitals Reviewed: Yes    No anethesia notable event occurred.    Staff: DOROTHEA           /52 (02/28/24 1515)    Temp 97.6 °F (36.4 °C) (02/28/24 1515)    Pulse 75 (02/28/24 1515)   Resp 16 (02/28/24 1515)    SpO2 96 % (02/28/24 1515)

## 2024-02-28 NOTE — ASSESSMENT & PLAN NOTE
Hospitalized from 2/13/24 to 2/23/24 for right groin surgical wound infection from previous RLE bypass  S/p debridement on 2/14/2024 and washout on 2/19/2024  Wound cultures - Klebsiella and Proteus  Has VAC dressing on the right groin managed by surgery  ID recommended continuation of antibiotics through 3/22/2024 due to proximity of wound infection to the bypass graft - on Cefpodoxime 400 mg twice daily

## 2024-02-28 NOTE — ASSESSMENT & PLAN NOTE
Hospitalized from 2/13/24 to 2/23/24 for right groin surgical wound infection from previous RLE bypass  S/p debridement on 2/14/2024 and washout on 2/19/2024  Wound cultures - Klebsiella and Proteus  S/p removal of vac by vascular surgery on 2/28  ID recommended continuation of antibiotics through 3/22/2024 due to proximity of wound infection to the bypass graft - on Cefpodoxime 400 mg twice daily

## 2024-02-28 NOTE — PLAN OF CARE
Problem: PAIN - ADULT  Goal: Verbalizes/displays adequate comfort level or baseline comfort level  Description: Interventions:  - Encourage patient to monitor pain and request assistance  - Assess pain using appropriate pain scale  - Administer analgesics based on type and severity of pain and evaluate response  - Implement non-pharmacological measures as appropriate and evaluate response  - Consider cultural and social influences on pain and pain management  - Notify physician/advanced practitioner if interventions unsuccessful or patient reports new pain  Outcome: Progressing     Problem: INFECTION - ADULT  Goal: Absence or prevention of progression during hospitalization  Description: INTERVENTIONS:  - Assess and monitor for signs and symptoms of infection  - Monitor lab/diagnostic results  - Monitor all insertion sites, i.e. indwelling lines, tubes, and drains  - Monitor endotracheal if appropriate and nasal secretions for changes in amount and color  - [place] appropriate cooling/warming therapies per order  - Administer medications as ordered  - Instruct and encourage patient and family to use good hand hygiene technique  - Identify and instruct in appropriate isolation precautions for identified infection/condition  Outcome: Progressing  Goal: Absence of fever/infection during neutropenic period  Description: INTERVENTIONS:  - Monitor WBC    Outcome: Progressing     Problem: SAFETY ADULT  Goal: Patient will remain free of falls  Description: INTERVENTIONS:  - Educate patient/family on patient safety including physical limitations  - Instruct patient to call for assistance with activity   - Consult OT/PT to assist with strengthening/mobility   - Keep Call bell within reach  - Keep bed low and locked with side rails adjusted as appropriate  - Keep care items and personal belongings within reach  - Initiate and maintain comfort rounds  - Make Fall Risk Sign visible to staff  - Offer Toileting every ***  Hours, in advance of need  - Initiate/Maintain ***alarm  - Obtain necessary fall risk management equipment: ***  - Apply yellow socks and bracelet for high fall risk patients  - Consider moving patient to room near nurses station  Outcome: Progressing  Goal: Maintain or return to baseline ADL function  Description: INTERVENTIONS:  -  Assess patient's ability to carry out ADLs; assess patient's baseline for ADL function and identify physical deficits which impact ability to perform ADLs (bathing, care of mouth/teeth, toileting, grooming, dressing, etc.)  - Assess/evaluate cause of self-care deficits   - Assess range of motion  - Assess patient's mobility; develop plan if impaired  - Assess patient's need for assistive devices and provide as appropriate  - Encourage maximum independence but intervene and supervise when necessary  - Involve family in performance of ADLs  - Assess for home care needs following discharge   - Consider OT consult to assist with ADL evaluation and planning for discharge  - Provide patient education as appropriate  Outcome: Progressing  Goal: Maintains/Returns to pre admission functional level  Description: INTERVENTIONS:  - Perform AM-PAC 6 Click Basic Mobility/ Daily Activity assessment daily.  - Set and communicate daily mobility goal to care team and patient/family/caregiver.   - Collaborate with rehabilitation services on mobility goals if consulted  - Perform Range of Motion *** times a day.  - Reposition patient every *** hours.  - Dangle patient *** times a day  - Stand patient *** times a day  - Ambulate patient *** times a day  - Out of bed to chair *** times a day   - Out of bed for meals *** times a day  - Out of bed for toileting  - Record patient progress and toleration of activity level   Outcome: Progressing     Problem: DISCHARGE PLANNING  Goal: Discharge to home or other facility with appropriate resources  Description: INTERVENTIONS:  - Identify barriers to discharge  w/patient and caregiver  - Arrange for needed discharge resources and transportation as appropriate  - Identify discharge learning needs (meds, wound care, etc.)  - Arrange for interpretive services to assist at discharge as needed  - Refer to Case Management Department for coordinating discharge planning if the patient needs post-hospital services based on physician/advanced practitioner order or complex needs related to functional status, cognitive ability, or social support system  Outcome: Progressing     Problem: Knowledge Deficit  Goal: Patient/family/caregiver demonstrates understanding of disease process, treatment plan, medications, and discharge instructions  Description: Complete learning assessment and assess knowledge base.  Interventions:  - Provide teaching at level of understanding  - Provide teaching via preferred learning methods  Outcome: Progressing

## 2024-02-28 NOTE — ASSESSMENT & PLAN NOTE
Lab Results   Component Value Date    HGBA1C 6.0 (H) 02/28/2024       Recent Labs     02/27/24  0624 02/27/24  1037 02/27/24 2055 02/28/24  0735   POCGLU 118 113 185* 147*         Blood Sugar Average: Last 72 hrs:  (P) 166  Home regimen: Januvia 1000 mg daily, Metformin 1000 mg daily  Home meds held  SSI  Lyrica for neuropathic pain  Monitor blood sugars and adjust insulin accordingly

## 2024-02-28 NOTE — UTILIZATION REVIEW
NOTIFICATION OF INPATIENT ADMISSION   AUTHORIZATION REQUEST   SERVICING FACILITY:   [facility]  Address: [address] [place] [address]  Tax ID: [id number]  NPI: [id number] ATTENDING PROVIDER:  Attending Name and NPI#: Kavya Lang Md [[id number]]  Address: [address] [place] [address]  Phone: [phone]   ADMISSION INFORMATION:  Place of Service: Inpatient [facility]  Place of Service Code: 21  Inpatient Admission Date/Time: 2/27/24  4:15 PM  Discharge Date/Time: No discharge date for patient encounter.  Admitting Diagnosis Code/Description:  Surgical site infection [T81.49XA]     UTILIZATION REVIEW CONTACT:  Mehdi Morrison Utilization   Network Utilization Review Department  Phone: [phone]  Fax: [phone]  Email: [email or web address]  Contact for approvals/pending authorizations, clinical reviews, and discharge.     PHYSICIAN ADVISORY SERVICES:  Medical Necessity Denial & Evnr-fr-Gdzh Review  Phone: [phone]  Fax: [phone]  Email: [email or web address]     DISCHARGE SUPPORT TEAM:  For Patients Discharge Needs & Updates  Phone: [phone] opt. 2 Fax: [phone]  Email: [email or web address]

## 2024-02-28 NOTE — ASSESSMENT & PLAN NOTE
Right posterior ankle ulcer with exposed Achilles tendon s/p wound debridement on 2/4/24  Osteomyelitis of right second toe s/p amputation on 2/4/24  Transferred from [facility] for STSG planned for 2/28/24 by podiatry for right posterior ankle wound

## 2024-02-28 NOTE — DISCHARGE INSTR - OTHER ORDERS
Skin Care Plan:  1-Right Groin Wound: Cleanse wound with NSS and pat dry. Fluff wound bed with melgisorb Ag and cover with a silicone bordered foam dressing. Gibson with T for Treatment and change daily or PRN soilage/displacement.   2-Turn/reposition q2h or when medically stable for pressure re-distribution on skin .  3-Elevate heels to offload pressure.  4-Moisturize skin daily with skin nourishing cream  5-Ehob cushion in chair when out of bed.  6-RLE and Right Foot: per podiatry recommendations.   7-Left Heel: Apply Silicone Border Foam (Mepilex) to areas. Gibson with P for Prevention and change every 3 days or PRN soilage/displacement. Peel back and inspect Q-shift.  8-Mid Sacrum Wound: Cleanse with soap and water. Pat dry. Apply triad paste to mid sacrum wound and cover with a Silicone Bordered Foam Dressing. Gibson with T for Treatment and change every day or PRN soilage/displacement

## 2024-02-28 NOTE — PROGRESS NOTES
"Progress Note - Vascular Surgery   Lona Cedeno 78 y.o. female MRN: [id number]  Unit/Bed#: NW8 876-01 Encounter: [id number]    Assessment:  Lona Cedeno is a 78 y.o. female w/ history of smoking, HTN, T2DM, GERD, COPD, R ankle fx s/p ORIF '08, AOID with nonhealing R foot wounds S/P Right Ax to Fem PTFE bypass 1/31 and Right chest wall hematoma evacuation, right foot wound and achilles debridement with 2nd toe partial amputation, VAC placement 2/4. Now represents from [facility] with fevers, leukocytosis, encephalopathy with concern for Right groin wound sloughing at incision site.     S/P Right Groin washout and vac 2/14  S/P Right Axillary I&D, Pods Achilles debridement, partial 2nd to amp, Hematoma evac 2/4  S/P Right Ax to Fem PTFE bypass 1/31    Plan:  Left groin wound vac removed by vascular team, well granulated wound bed noted with minimal depth, no undermining or tunneling, no further indication for negative pressure therapy   Recommend wound care consult for continued management   In interim consider Maxorb, guaze, and tegaderm until wound care recs in place   Continue ASA, statin, Xarelto (unless pending procedural intervention)   Podiatry for right foot/achilles tendon management   Remainder of care per primary, please contact if any future issues or concerns arise     Subjective/Objective    Subjective: No acute events overnight. No active complaints, comfortable in bed. Denies paresthesias or M/S dysfunction.     Objective:     Blood pressure 98/62, pulse 79, temperature 97.6 °F (36.4 °C), temperature source Oral, resp. rate 16, height 5' 1\" (1.549 m), weight 69.4 kg (153 lb), SpO2 98%.,Body mass index is 28.91 kg/m².      Intake/Output Summary (Last 24 hours) at 2/28/2024 0739  Last data filed at 2/27/2024 2001  Gross per 24 hour   Intake --   Output 45 ml   Net -45 ml       Invasive Devices       Peripheral Intravenous Line  Duration             Peripheral IV 02/27/24 Left;Ventral (anterior) Forearm <1 day "              Drain  Duration             Ureteral Internal Stent Left ureter 6 Fr. 99 days                    Physical Exam:   GEN: NAD  HEENT: NCAT  CV: RRR  Lung: Normal effort  Ab: Soft, NT/ND  Extrem: Right groin wound with healthy granulation tissue, no purulence, no necrosis, no tunneling or undermining  Neuro: A+Ox3     Lab, Imaging and other studies:I have personally reviewed pertinent lab results.       Recent Results (from the past 36 hour(s))   Fingerstick Glucose (POCT)    Collection Time: 02/26/24  9:04 PM   Result Value Ref Range    POC Glucose 115 65 - 140 mg/dl   Fingerstick Glucose (POCT)    Collection Time: 02/27/24  6:24 AM   Result Value Ref Range    POC Glucose 118 65 - 140 mg/dl   Fingerstick Glucose (POCT)    Collection Time: 02/27/24 10:37 AM   Result Value Ref Range    POC Glucose 113 65 - 140 mg/dl   Fingerstick Glucose (POCT)    Collection Time: 02/27/24  8:55 PM   Result Value Ref Range    POC Glucose 185 (H) 65 - 140 mg/dl   CBC and differential    Collection Time: 02/28/24  3:20 AM   Result Value Ref Range    WBC 8.42 4.31 - 10.16 Thousand/uL    RBC 3.68 (L) 3.81 - 5.12 Million/uL    Hemoglobin 11.3 (L) 11.5 - 15.4 g/dL    Hematocrit 36.8 34.8 - 46.1 %     (H) 82 - 98 fL    MCH 30.7 26.8 - 34.3 pg    MCHC 30.7 (L) 31.4 - 37.4 g/dL    RDW 15.3 (H) 11.6 - 15.1 %    MPV 9.5 8.9 - 12.7 fL    Platelets 308 149 - 390 Thousands/uL    nRBC 0 /100 WBCs    Neutrophils Relative 59 43 - 75 %    Immat GRANS % 0 0 - 2 %    Lymphocytes Relative 23 14 - 44 %    Monocytes Relative 6 4 - 12 %    Eosinophils Relative 12 (H) 0 - 6 %    Basophils Relative 0 0 - 1 %    Neutrophils Absolute 4.96 1.85 - 7.62 Thousands/µL    Immature Grans Absolute 0.03 0.00 - 0.20 Thousand/uL    Lymphocytes Absolute 1.95 0.60 - 4.47 Thousands/µL    Monocytes Absolute 0.46 0.17 - 1.22 Thousand/µL    Eosinophils Absolute 0.99 (H) 0.00 - 0.61 Thousand/µL    Basophils Absolute 0.03 0.00 - 0.10 Thousands/µL   Comprehensive  metabolic panel    Collection Time: 02/28/24  3:40 AM   Result Value Ref Range    Sodium 140 135 - 147 mmol/L    Potassium 4.1 3.5 - 5.3 mmol/L    Chloride 101 96 - 108 mmol/L    CO2 31 21 - 32 mmol/L    ANION GAP 8 mmol/L    BUN 17 5 - 25 mg/dL    Creatinine 0.61 0.60 - 1.30 mg/dL    Glucose 128 65 - 140 mg/dL    Calcium 10.1 8.4 - 10.2 mg/dL    AST 14 13 - 39 U/L    ALT 4 (L) 7 - 52 U/L    Alkaline Phosphatase 54 34 - 104 U/L    Total Protein 6.0 (L) 6.4 - 8.4 g/dL    Albumin 3.7 3.5 - 5.0 g/dL    Total Bilirubin 0.27 0.20 - 1.00 mg/dL    eGFR 87 ml/min/1.73sq m   Magnesium    Collection Time: 02/28/24  3:40 AM   Result Value Ref Range    Magnesium 1.6 (L) 1.9 - 2.7 mg/dL   Hemoglobin A1c w/EAG Estimation (Orders if not completed within the last 90 days)    Collection Time: 02/28/24  4:38 AM   Result Value Ref Range    Hemoglobin A1C 6.0 (H) Normal 4.0-5.6%; PreDiabetic 5.7-6.4%; Diabetic >=6.5%; Glycemic control for adults with diabetes <7.0% %     mg/dl

## 2024-02-28 NOTE — ASSESSMENT & PLAN NOTE
Right posterior ankle ulcer with exposed Achilles tendon s/p wound debridement on 2/4/24  Osteomyelitis of right second toe s/p amputation on 2/4/24  Transferred from [facility] for STSG planned for 2/28/24 by podiatry for right posterior ankle wound      (P) 166

## 2024-02-28 NOTE — WOUND OSTOMY CARE
Consult Note - Wound   Lona Cedeno 78 y.o. female MRN: [id number]  Unit/Bed#: LANIE CAMPBELL Encounter: [id number]        History and Present Illness:  Patient is a 79 yo fe male that was admitted from [facility] to [facility] for treatment of diabetic foot ulcer with osteo. Patient has a PMH of  PAD, DM foot ulceration. Patient is a min assist for turning and repositioning. Patient is continent of bowel and bladder. On assessment, patient is lying on regular mattress.     Wound Care was consulted for right groin and sacrum wound     Assessment Findings:   Left heel is dry, intact, and pink in color- tissue blanches. Recommend continuing with silicone bordered foam dressing to area.     Patient to go to OR today with Podiatry for skin graft - RLE per podiatry recommendations. Unable to assess right heel at this time- offloading boot in place.     POA Mid Sacrum Unstageable Pressure Injury: irregular in shape area of full thickness skin loss. Wound bed is 100% yellow slough tissue. Ani-wound is fragile and hyperpigmented. Scant serosanguineous drainage noted. Recommend triad paste and silicone bordered foam dressing.  Right Groin Wound: previous incision site- irregular in shape area of full thickness skin loss. Wound bed is mix of beefy red granulation tissue and yellow slough tissue. Ani-wound is fragile. Moderate to large amount of serosanguineous/ yellow drainage. Recommend silver alginate and foam dressing. Vascular made aware of recommendations     No induration, fluctuance, odor, warmth/temperature differences, redness, or purulence noted to the above noted wounds and skin areas assessed. New dressings applied per orders listed below. Patient tolerated well- no s/s of non-verbal pain or discomfort observed during the encounter. Bedside nurse aware of plan of care. See flow sheets for more detailed assessment findings.      Orders listed below and wound care will continue to follow, call or tiger text with  questions.     Skin Care Plan:  1-Right Groin Wound: Cleanse wound with NSS and pat dry. Fluff wound bed with melgisorb Ag and cover with a silicone bordered foam dressing. Gibson with T for Treatment and change daily or PRN soilage/displacement.   2-Turn/reposition q2h or when medically stable for pressure re-distribution on skin .  3-Elevate heels to offload pressure.  4-Moisturize skin daily with skin nourishing cream  5-Ehob cushion in chair when out of bed.  6-RLE and Right Foot: per podiatry recommendations.   7-Left Heel: Apply Silicone Border Foam (Mepilex) to areas. Gibson with P for Prevention and change every 3 days or PRN soilage/displacement. Peel back and inspect Q-shift.  8-Mid Sacrum Wound: Cleanse with soap and water. Pat dry. Apply triad paste to mid sacrum wound and cover with a Silicone Bordered Foam Dressing. Gibson with T for Treatment and change every day or PRN soilage/displacement       Wounds:  Wound 02/14/24 Groin Right (Active)   Wound Image   02/28/24 0910   Wound Description Beefy red;Bleeding;Yellow;Slough 02/28/24 1530   Ani-wound Assessment Fragile 02/28/24 1530   Wound Length (cm) 7 cm 02/28/24 0910   Wound Width (cm) 1.5 cm 02/28/24 0910   Wound Depth (cm) 1 cm 02/28/24 0910   Wound Surface Area (cm^2) 10.5 cm^2 02/28/24 0910   Wound Volume (cm^3) 10.5 cm^3 02/28/24 0910   Calculated Wound Volume (cm^3) 10.5 cm^3 02/28/24 0910   Drainage Amount Large 02/28/24 1530   Drainage Description Serosanguineous 02/28/24 1530   Non-staged Wound Description Full thickness 02/28/24 1530   Treatments Cleansed;Irrigation with NSS;Site care 02/28/24 1530   Dressing Calcium Alginate with Silver;Foam, Silicon (eg. Allevyn, etc) 02/28/24 1530   Dressing Changed New 02/28/24 1530   Patient Tolerance Tolerated well 02/28/24 1530   Dressing Status Clean;Dry;Intact 02/28/24 1530   Wound 02/23/24 Pressure Injury Coccyx Mid (Active)   Wound Image   02/28/24 0931   Wound Description Yellow;Slough 02/28/24 6362    Pressure Injury Stage U 02/28/24 1530   Ani-wound Assessment Fragile;Hyperpigmented 02/28/24 1530   Wound Length (cm) 1 cm 02/28/24 0913   Wound Width (cm) 0.3 cm 02/28/24 0913   Wound Depth (cm) 0.2 cm 02/28/24 0913   Wound Surface Area (cm^2) 0.3 cm^2 02/28/24 0913   Wound Volume (cm^3) 0.06 cm^3 02/28/24 0913   Calculated Wound Volume (cm^3) 0.06 cm^3 02/28/24 0913   Change in Wound Size % 25 02/28/24 0913   Drainage Amount Scant 02/28/24 1530   Drainage Description Serosanguineous 02/28/24 1530   Non-staged Wound Description Full thickness 02/28/24 1530   Treatments Cleansed;Site care;Irrigation with NSS 02/28/24 1530   Dressing Other (Comment);Foam, Silicon (eg. Allevyn, etc) 02/28/24 1530   Dressing Changed New 02/28/24 1530   Patient Tolerance Tolerated well 02/28/24 1530   Dressing Status Clean;Dry;Intact 02/28/24 1530               Sandra Vo RN, BSN, CWOCN

## 2024-02-28 NOTE — PLAN OF CARE
Problem: PAIN - ADULT  Goal: Verbalizes/displays adequate comfort level or baseline comfort level  Description: Interventions:  - Encourage patient to monitor pain and request assistance  - Assess pain using appropriate pain scale  - Administer analgesics based on type and severity of pain and evaluate response  - Implement non-pharmacological measures as appropriate and evaluate response  - Consider cultural and social influences on pain and pain management  - Notify physician/advanced practitioner if interventions unsuccessful or patient reports new pain  Outcome: Progressing     Problem: INFECTION - ADULT  Goal: Absence or prevention of progression during hospitalization  Description: INTERVENTIONS:  - Assess and monitor for signs and symptoms of infection  - Monitor lab/diagnostic results  - Monitor all insertion sites, i.e. indwelling lines, tubes, and drains  - Monitor endotracheal if appropriate and nasal secretions for changes in amount and color  - [place] appropriate cooling/warming therapies per order  - Administer medications as ordered  - Instruct and encourage patient and family to use good hand hygiene technique  - Identify and instruct in appropriate isolation precautions for identified infection/condition  Outcome: Progressing  Goal: Absence of fever/infection during neutropenic period  Description: INTERVENTIONS:  - Monitor WBC    Outcome: Progressing     Problem: SAFETY ADULT  Goal: Patient will remain free of falls  Description: INTERVENTIONS:  - Educate patient/family on patient safety including physical limitations  - Instruct patient to call for assistance with activity   - Consult OT/PT to assist with strengthening/mobility   - Keep Call bell within reach  - Keep bed low and locked with side rails adjusted as appropriate  - Keep care items and personal belongings within reach  - Initiate and maintain comfort rounds  - Make Fall Risk Sign visible to staff  - Offer Toileting every 4 Hours,  in advance of need  - Initiate/Maintain 4alarm  - Obtain necessary fall risk management equipment: 4  - Apply yellow socks and bracelet for high fall risk patients  - Consider moving patient to room near nurses station  Outcome: Progressing  Goal: Maintain or return to baseline ADL function  Description: INTERVENTIONS:  -  Assess patient's ability to carry out ADLs; assess patient's baseline for ADL function and identify physical deficits which impact ability to perform ADLs (bathing, care of mouth/teeth, toileting, grooming, dressing, etc.)  - Assess/evaluate cause of self-care deficits   - Assess range of motion  - Assess patient's mobility; develop plan if impaired  - Assess patient's need for assistive devices and provide as appropriate  - Encourage maximum independence but intervene and supervise when necessary  - Involve family in performance of ADLs  - Assess for home care needs following discharge   - Consider OT consult to assist with ADL evaluation and planning for discharge  - Provide patient education as appropriate  Outcome: Progressing  Goal: Maintains/Returns to pre admission functional level  Description: INTERVENTIONS:  - Perform AM-PAC 6 Click Basic Mobility/ Daily Activity assessment daily.  - Set and communicate daily mobility goal to care team and patient/family/caregiver.   - Collaborate with rehabilitation services on mobility goals if consulted  - Perform Range of Motion 4 times a day.  - Reposition patient every 4 hours.  - Dangle patient 4 times a day  - Stand patient 4 times a day  - Ambulate patient 4 times a day  - Out of bed to chair 4 times a day   - Out of bed for meals 4 times a day  - Out of bed for toileting  - Record patient progress and toleration of activity level   Outcome: Progressing     Problem: DISCHARGE PLANNING  Goal: Discharge to home or other facility with appropriate resources  Description: INTERVENTIONS:  - Identify barriers to discharge w/patient and caregiver  -  Arrange for needed discharge resources and transportation as appropriate  - Identify discharge learning needs (meds, wound care, etc.)  - Arrange for interpretive services to assist at discharge as needed  - Refer to Case Management Department for coordinating discharge planning if the patient needs post-hospital services based on physician/advanced practitioner order or complex needs related to functional status, cognitive ability, or social support system  Outcome: Progressing     Problem: Knowledge Deficit  Goal: Patient/family/caregiver demonstrates understanding of disease process, treatment plan, medications, and discharge instructions  Description: Complete learning assessment and assess knowledge base.  Interventions:  - Provide teaching at level of understanding  - Provide teaching via preferred learning methods  Outcome: Progressing     Problem: Prexisting or High Potential for Compromised Skin Integrity  Goal: Skin integrity is maintained or improved  Description: INTERVENTIONS:  - Identify patients at risk for skin breakdown  - Assess and monitor skin integrity  - Assess and monitor nutrition and hydration status  - Monitor labs   - Assess for incontinence   - Turn and reposition patient  - Assist with mobility/ambulation  - Relieve pressure over bony prominences  - Avoid friction and shearing  - Provide appropriate hygiene as needed including keeping skin clean and dry  - Evaluate need for skin moisturizer/barrier cream  - Collaborate with interdisciplinary team   - Patient/family teaching  - Consider wound care consult   Outcome: Progressing     Problem: MOBILITY - ADULT  Goal: Maintain or return to baseline ADL function  Description: INTERVENTIONS:  -  Assess patient's ability to carry out ADLs; assess patient's baseline for ADL function and identify physical deficits which impact ability to perform ADLs (bathing, care of mouth/teeth, toileting, grooming, dressing, etc.)  - Assess/evaluate cause of  self-care deficits   - Assess range of motion  - Assess patient's mobility; develop plan if impaired  - Assess patient's need for assistive devices and provide as appropriate  - Encourage maximum independence but intervene and supervise when necessary  - Involve family in performance of ADLs  - Assess for home care needs following discharge   - Consider OT consult to assist with ADL evaluation and planning for discharge  - Provide patient education as appropriate  Outcome: Progressing  Goal: Maintains/Returns to pre admission functional level  Description: INTERVENTIONS:  - Perform AM-PAC 6 Click Basic Mobility/ Daily Activity assessment daily.  - Set and communicate daily mobility goal to care team and patient/family/caregiver.   - Collaborate with rehabilitation services on mobility goals if consulted  - Perform Range of Motion 4 times a day.  - Reposition patient every 4 hours.  - Dangle patient 4 times a day  - Stand patient 4 times a day  - Ambulate patient 4 times a day  - Out of bed to chair 4 times a day   - Out of bed for meals 4 times a day  - Out of bed for toileting  - Record patient progress and toleration of activity level   Outcome: Progressing

## 2024-02-28 NOTE — H&P
[facility]  H&P  Name: Lona Cedeno 78 y.o. female I MRN: [id number]  Unit/Bed#: NW8 876-01 I Date of Admission: 2/27/2024   Date of Service: 2/27/2024 I Hospital Day: 0      Assessment/Plan   * Diabetic foot ulcer with osteomyelitis ([facility])  Assessment & Plan  Right posterior ankle ulcer with exposed Achilles tendon s/p wound debridement on 2/4/24  Osteomyelitis of right second toe s/p amputation on 2/4/24  Transferred from [facility] for STSG planned for 2/28/24 by podiatry for right posterior ankle wound            Surgical site infection  Assessment & Plan  Hospitalized from 2/13/24 to 2/23/24 for right groin surgical wound infection from previous RLE bypass  S/p debridement on 2/14/2024 and washout on 2/19/2024  Wound cultures - Klebsiella and Proteus  Has VAC dressing on the right groin managed by surgery  ID recommended continuation of antibiotics through 3/22/2024 due to proximity of wound infection to the bypass graft - on Cefpodoxime 400 mg twice daily    PAD (peripheral artery disease) ([facility])  Assessment & Plan  Xarelto held for STSG per podiatry - restart when cleared by podiatry  Ct ASA, statin  Vascular managing right groin wound    Type 2 diabetes mellitus with diabetic polyneuropathy ([facility])  Assessment & Plan  Lab Results   Component Value Date    HGBA1C 6.5 (H) 11/07/2023       Recent Labs     02/26/24  1553 02/26/24  2104 02/27/24  0624 02/27/24  1037   POCGLU 99 115 118 113       Blood Sugar Average: Last 72 hrs:    Home regimen: Januvia 1000 mg daily, Metformin 1000 mg daily  Home meds held  SSI  Lyrica for neuropathic pain  Monitor blood sugars and adjust insulin accordingly    Tobacco use disorder  Assessment & Plan  Ct nicotine patch  Smoking cessation advised    GERD without esophagitis  Assessment & Plan  Ct PPI    Anxiety and depression  Assessment & Plan  Ct Wellbutrin  mg daily, Clonazepam 1 mg daily    COPD, severity to be determined ([facility])  Assessment &  Plan  No exacerbation  Home Advair replaced by equivalent formulary Breo  Continue albuterol as needed    Sacral wound  Assessment & Plan  Noted to have unstageable wound on the sacrum on initial admission  Continue wound care         VTE Pharmacologic Prophylaxis: VTE Score: 4 Moderate Risk (Score 3-4) - Pharmacological DVT Prophylaxis Ordered: heparin.  Code Status: Level 1 - Full Code   Discussion with family: Updated  () at bedside.    Anticipated Length of Stay: Patient will be admitted on an inpatient basis with an anticipated length of stay of greater than 2 midnights secondary to awaiting STSG    Total Time Spent on Date of Encounter in care of patient: 76 mins. This time was spent on one or more of the following: performing physical exam; counseling and coordination of care; obtaining or reviewing history; documenting in the medical record; reviewing/ordering tests, medications or procedures; communicating with other healthcare professionals and discussing with patient's family/caregivers.    Chief Complaint: Right posterior ankle wound    History of Present Illness:  Lona Cedeno is a 78 y.o. female with a PMH of peripheral arterial disease, type 2 diabetes, hypertension, tobacco abuse, COPD, GERD, anxiety and depression, who presents as a direct admit from [facility] for STSG for right posterior ankle ulcer by podiatry on 2/28/2024.  She was admitted there for rehab following hospitalization from 2/13/2024 to 2/23/2024 for surgical site infection of her right groin.  She denies any pain at present.  No fever or chills.  No nausea, vomiting or diarrhea.     Review of Systems:  Review of Systems   Skin:  Positive for wound.   All other systems reviewed and are negative.      Past Medical and Surgical History:   Past Medical History:   Diagnosis Date    Anxiety     Closed fracture of coccyx (HCC) 05/24/2023    Diabetes mellitus (HCC)     GERD (gastroesophageal reflux disease)      Hyperlipidemia     Hypertension     IBS (irritable bowel syndrome)     Shoulder fracture        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;  Surgeon: Cristian Child MD;  Location: [facility];  Service: Urology    EVACUATION OF HEMATOMA Right 2024    Procedure: EVACUATION/ DRAINAGE CHEST WALL  HEMATOMA;  Surgeon: Seth Hoyos MD;  Location: [facility];  Service: Vascular    FL RETROGRADE PYELOGRAM  2023    HERNIA REPAIR      umbilicus w/ mesh    MT BYPASS W/VEIN AXILLARY-FEMORAL Right 2024    Procedure: BYPASS AXILLO-FEMORAL, RIGHT WITH 8MM RINGED PTFE GRAFT;  Surgeon: Seth Hoyos MD;  Location: [facility];  Service: Vascular    WOUND DEBRIDEMENT Right 2024    Procedure: RIGHT WOUND AND ACHILLES DEBRIDEMENT FOOT/TOE (WASH OUT); PARTIAL AMPUTATION DISTAL 2ND TOE;  Surgeon: Aaron Montero DPM;  Location: [facility];  Service: Podiatry    WOUND DEBRIDEMENT Right 2024    Procedure: DEBRIDEMENT RIGHT GROIN  WOUND AND WASHOUT, APPLICATION OF WOUND VAC;  Surgeon: Suly Muro DO;  Location: [facility];  Service: Vascular    WOUND DEBRIDEMENT Right 2024    Procedure: DEBRIDEMENT LOWER EXTREMITY, washout;  Surgeon: Suly Muro DO;  Location: [facility];  Service: Vascular       Meds/Allergies:  Prior to Admission medications    Medication Sig Start Date End Date Taking? Authorizing Provider   acetaminophen (TYLENOL) 325 mg tablet Take 3 tablets (975 mg total) by mouth every 8 (eight) hours  2/23/24   Clara Gann DO   albuterol (Ventolin HFA) 90 mcg/act inhaler Inhale 2 puffs every 6 (six) hours as needed for wheezing 11/10/23   Vivek Perston DO   Aspirin (ASPIR-81 PO) take one by mouth daily 1/14/14   Historical Provider, MD   atorvastatin (LIPITOR) 10 mg tablet Take 1 tablet (10 mg total) by mouth daily with dinner 9/27/23   Vivek Preston DO   bisacodyl (DULCOLAX) 5 mg EC tablet Take 1 tablet (5 mg total) by mouth daily as needed for constipation 2/23/24   Clara Gann DO   Blood Glucose Monitoring Suppl (ONETOUCH VERIO) w/Device KIT by Does not apply route 2 (two) times a day 1/7/20   Vivek Preston DO   buPROPion (WELLBUTRIN XL) 300 mg 24 hr tablet Take 1 tablet (300 mg total) by mouth daily 11/28/23   Vivek Preston DO   cefpodoxime (VANTIN) 200 mg tablet Take 2 tablets (400 mg total) by mouth 2 (two) times a day with meals for 56 doses 2/23/24 3/22/24  Clara Gann DO   clonazePAM (KlonoPIN) 1 mg tablet Take 1 tablet (1 mg total) by mouth every evening 1/25/24   Vivek Preston DO   cyanocobalamin (VITAMIN B-12) 1000 MCG tablet Take 1 tablet (1,000 mcg total) by mouth daily 2/24/24   Clara Gann DO   Fluticasone-Salmeterol (Advair Diskus) 250-50 mcg/dose inhaler Inhale 1 puff 2 (two) times a day Rinse mouth after use. 11/10/23 12/10/23  Vivek Preston DO   Lancets (OneTouch Delica Plus [id number]) MISC TEST TWO TIMES A DAY 12/8/23   Vivek Preston DO   methocarbamol (ROBAXIN) 500 mg tablet Take 0.5 tablets (250 mg total) by mouth every 8 (eight) hours 2/9/24   Ofe Wood MD   Misc. Devices (Carex Coccyx Cushion) MISC Use in the morning 5/24/23   ROXANE Garcia   nicotine (NICODERM CQ) 21 mg/24 hr TD 24 hr patch Place 1 patch on the skin over 24 hours daily 2/10/24   Ofe Wood MD   Omega-3 Fatty Acids (FISH OIL PO)  3/24/14   Historical Provider, MD   pantoprazole (PROTONIX) 40 mg tablet 40 mg 10/31/23   Historical Provider,  "MD   polyethylene glycol (MIRALAX) 17 g packet Take 17 g by mouth daily 2/24/24   Clara Gann DO   pregabalin (LYRICA) 200 MG capsule TAKE ONE CAPSULE BY MOUTH THREE TIMES A DAY 8/21/23   Vivek Preston DO   rivaroxaban (Xarelto) 2.5 mg tablet Take 1 tablet (2.5 mg total) by mouth 2 (two) times a day 2/1/24   Felipa Foy PA-C   senna-docusate sodium (SENOKOT S) 8.6-50 mg per tablet Take 1 tablet by mouth 2 (two) times a day 2/23/24   Clara Gann DO   solifenacin (VESICARE) 5 mg tablet Take 1 tablet (5 mg total) by mouth daily 11/28/23   Vivek Preston DO     I have reviewed home medications with patient personally.    Allergies:   Allergies   Allergen Reactions    Paroxetine Other (See Comments)     Became suicidal    Adhesive [Medical Tape] Itching and Blisters    Carafate [Sucralfate] Other (See Comments)     Mouth sores, tongue sore    Sulfa Antibiotics Hives and Rash    Sulfamethoxazole-Trimethoprim Hives       Social History:  Marital Status: /Civil Union   Substance Use History:   Social History     Substance and Sexual Activity   Alcohol Use Not Currently     Social History     Tobacco Use   Smoking Status Every Day    Current packs/day: 1.00    Average packs/day: 1 pack/day for 63.2 years (63.2 ttl pk-yrs)    Types: Cigarettes    Start date: 1961   Smokeless Tobacco Never   Tobacco Comments    11/14/23 smokes about 3/4 PPD     Social History     Substance and Sexual Activity   Drug Use Never       Family History:  Family History   Problem Relation Age of Onset    COPD Mother     Emphysema Mother     COPD Father     Emphysema Father        Physical Exam:     Vitals:   Blood Pressure: 136/75 (02/27/24 1836)  Pulse: 84 (02/27/24 1836)  Temperature: 98.3 °F (36.8 °C) (02/27/24 1836)  Temp Source: Oral (02/27/24 1836)  Respirations: 18 (02/27/24 1836)  Height: 5' 1\" (154.9 cm) (02/27/24 1836)  Weight - Scale: 69.4 kg (153 lb) (02/27/24 1836)  SpO2: 96 % (02/27/24 " 1655)    Physical Exam  Vitals reviewed.   HENT:      Head: Normocephalic.      Nose: Nose normal.      Mouth/Throat:      Mouth: Mucous membranes are moist.   Eyes:      Extraocular Movements: Extraocular movements intact.   Cardiovascular:      Rate and Rhythm: Normal rate and regular rhythm.   Pulmonary:      Effort: Pulmonary effort is normal. No respiratory distress.      Breath sounds: Normal breath sounds. No wheezing.   Abdominal:      General: Bowel sounds are normal. There is no distension.      Palpations: Abdomen is soft.      Tenderness: There is no abdominal tenderness.   Musculoskeletal:      Cervical back: Neck supple.   Skin:     Comments: Right groin wound with wound VAC  Right posterior ankle ulcer  Sacral ulcer   Neurological:      General: No focal deficit present.      Mental Status: She is alert and oriented to person, place, and time.   Psychiatric:         Mood and Affect: Mood normal.         Behavior: Behavior normal.          Additional Data:     Lab Results:  Results from last 7 days   Lab Units 02/23/24  0553   WBC Thousand/uL 7.99   HEMOGLOBIN g/dL 10.9*   HEMATOCRIT % 35.3   PLATELETS Thousands/uL 315   NEUTROS PCT % 58   LYMPHS PCT % 25   MONOS PCT % 7   EOS PCT % 9*     Results from last 7 days   Lab Units 02/23/24  0553   SODIUM mmol/L 137   POTASSIUM mmol/L 3.9   CHLORIDE mmol/L 100   CO2 mmol/L 30   BUN mg/dL 17   CREATININE mg/dL 0.59*   ANION GAP mmol/L 7   CALCIUM mg/dL 9.4   GLUCOSE RANDOM mg/dL 160*         Results from last 7 days   Lab Units 02/27/24  1037 02/27/24  0624 02/26/24  2104 02/26/24  1553 02/26/24  1101 02/26/24  0630 02/25/24  2043 02/25/24  1606 02/25/24  1053 02/25/24  0546 02/24/24  2103 02/24/24  1545   POC GLUCOSE mg/dl 113 118 115 99 203* 141* 129 105 163* 155* 119 125               Lines/Drains:  Invasive Devices       Peripheral Intravenous Line  Duration             Peripheral IV 02/21/24 Dorsal (posterior);Left Forearm 6 days              Drain   Duration             Ureteral Internal Stent Left ureter 6 Fr. 99 days                    ** Please Note: This note has been constructed using a voice recognition system. **

## 2024-02-28 NOTE — ANESTHESIA PREPROCEDURE EVALUATION
Procedure:  SKIN GRAFT SPLIT THICKNESS (STSG)  EXTREMITY (Right: Leg Lower)    Relevant Problems   CARDIO   (+) Essential hypertension      ENDO   (+) Type 2 diabetes mellitus with diabetic polyneuropathy (HCC)      GI/HEPATIC   (+) GERD without esophagitis      HEMATOLOGY   (+) Anemia   (+) Other dietary vitamin B12 deficiency anemia      MUSCULOSKELETAL   (+) Degenerative lumbar disc      NEURO/PSYCH   (+) Anxiety and depression      PULMONARY   (+) COPD, severity to be determined (HCC)        Physical Exam    Airway    Mallampati score: II  TM Distance: >3 FB  Neck ROM: full     Dental       Cardiovascular  Cardiovascular exam normal    Pulmonary  Pulmonary exam normal     Other Findings  post-pubertal.      Anesthesia Plan  ASA Score- 3     Anesthesia Type- IV sedation with anesthesia with ASA Monitors.         Additional Monitors:     Airway Plan:     Comment: Multiple anesthetics without issues. Last 2/19 under MAC  No current cardiac/respiratory/illness concerns.       Plan Factors-Exercise tolerance (METS): >4 METS.    Chart reviewed. EKG reviewed. Imaging results reviewed. Existing labs reviewed. Patient summary reviewed.    Patient is a current smoker.              Induction- intravenous.    Postoperative Plan-     Informed Consent- Anesthetic plan and risks discussed with patient.  I personally reviewed this patient with the CRNA. Discussed and agreed on the Anesthesia Plan with the CRNA..

## 2024-02-29 LAB
ANION GAP SERPL CALCULATED.3IONS-SCNC: 8 MMOL/L
BASOPHILS # BLD AUTO: 0.04 THOUSANDS/ÂΜL (ref 0–0.1)
BASOPHILS NFR BLD AUTO: 1 % (ref 0–1)
BUN SERPL-MCNC: 16 MG/DL (ref 5–25)
CALCIUM SERPL-MCNC: 9.5 MG/DL (ref 8.4–10.2)
CHLORIDE SERPL-SCNC: 102 MMOL/L (ref 96–108)
CO2 SERPL-SCNC: 30 MMOL/L (ref 21–32)
CREAT SERPL-MCNC: 0.51 MG/DL (ref 0.6–1.3)
EOSINOPHIL # BLD AUTO: 0.96 THOUSAND/ÂΜL (ref 0–0.61)
EOSINOPHIL NFR BLD AUTO: 13 % (ref 0–6)
ERYTHROCYTE [DISTWIDTH] IN BLOOD BY AUTOMATED COUNT: 15.2 % (ref 11.6–15.1)
GFR SERPL CREATININE-BSD FRML MDRD: 93 ML/MIN/1.73SQ M
GLUCOSE SERPL-MCNC: 101 MG/DL (ref 65–140)
GLUCOSE SERPL-MCNC: 110 MG/DL (ref 65–140)
GLUCOSE SERPL-MCNC: 117 MG/DL (ref 65–140)
GLUCOSE SERPL-MCNC: 150 MG/DL (ref 65–140)
GLUCOSE SERPL-MCNC: 156 MG/DL (ref 65–140)
HCT VFR BLD AUTO: 36.3 % (ref 34.8–46.1)
HGB BLD-MCNC: 11.1 G/DL (ref 11.5–15.4)
IMM GRANULOCYTES # BLD AUTO: 0.01 THOUSAND/UL (ref 0–0.2)
IMM GRANULOCYTES NFR BLD AUTO: 0 % (ref 0–2)
LYMPHOCYTES # BLD AUTO: 1.93 THOUSANDS/ÂΜL (ref 0.6–4.47)
LYMPHOCYTES NFR BLD AUTO: 25 % (ref 14–44)
MAGNESIUM SERPL-MCNC: 1.7 MG/DL (ref 1.9–2.7)
MCH RBC QN AUTO: 31.4 PG (ref 26.8–34.3)
MCHC RBC AUTO-ENTMCNC: 30.6 G/DL (ref 31.4–37.4)
MCV RBC AUTO: 103 FL (ref 82–98)
MONOCYTES # BLD AUTO: 0.59 THOUSAND/ÂΜL (ref 0.17–1.22)
MONOCYTES NFR BLD AUTO: 8 % (ref 4–12)
NEUTROPHILS # BLD AUTO: 4.07 THOUSANDS/ÂΜL (ref 1.85–7.62)
NEUTS SEG NFR BLD AUTO: 53 % (ref 43–75)
NRBC BLD AUTO-RTO: 0 /100 WBCS
PLATELET # BLD AUTO: 299 THOUSANDS/UL (ref 149–390)
PMV BLD AUTO: 9.9 FL (ref 8.9–12.7)
POTASSIUM SERPL-SCNC: 4.1 MMOL/L (ref 3.5–5.3)
RBC # BLD AUTO: 3.54 MILLION/UL (ref 3.81–5.12)
SODIUM SERPL-SCNC: 140 MMOL/L (ref 135–147)
WBC # BLD AUTO: 7.6 THOUSAND/UL (ref 4.31–10.16)

## 2024-02-29 PROCEDURE — 97163 PT EVAL HIGH COMPLEX 45 MIN: CPT

## 2024-02-29 PROCEDURE — 85025 COMPLETE CBC W/AUTO DIFF WBC: CPT

## 2024-02-29 PROCEDURE — 83735 ASSAY OF MAGNESIUM: CPT

## 2024-02-29 PROCEDURE — 97167 OT EVAL HIGH COMPLEX 60 MIN: CPT

## 2024-02-29 PROCEDURE — 82948 REAGENT STRIP/BLOOD GLUCOSE: CPT

## 2024-02-29 PROCEDURE — NC001 PR NO CHARGE: Performed by: PODIATRIST

## 2024-02-29 PROCEDURE — 80048 BASIC METABOLIC PNL TOTAL CA: CPT

## 2024-02-29 PROCEDURE — 99232 SBSQ HOSP IP/OBS MODERATE 35: CPT | Performed by: PHYSICIAN ASSISTANT

## 2024-02-29 RX ORDER — MAGNESIUM SULFATE HEPTAHYDRATE 40 MG/ML
2 INJECTION, SOLUTION INTRAVENOUS ONCE
Status: COMPLETED | OUTPATIENT
Start: 2024-02-29 | End: 2024-02-29

## 2024-02-29 RX ADMIN — CYANOCOBALAMIN TAB 500 MCG 1000 MCG: 500 TAB at 08:10

## 2024-02-29 RX ADMIN — NICOTINE 1 PATCH: 14 PATCH, EXTENDED RELEASE TRANSDERMAL at 08:10

## 2024-02-29 RX ADMIN — OMEGA-3 FATTY ACIDS CAP 1000 MG 1000 MG: 1000 CAP at 08:10

## 2024-02-29 RX ADMIN — CEFPODOXIME PROXETIL 400 MG: 200 TABLET, FILM COATED ORAL at 08:10

## 2024-02-29 RX ADMIN — RIVAROXABAN 2.5 MG: 2.5 TABLET, FILM COATED ORAL at 08:16

## 2024-02-29 RX ADMIN — OXYCODONE HYDROCHLORIDE 5 MG: 5 TABLET ORAL at 22:27

## 2024-02-29 RX ADMIN — FLUTICASONE FUROATE AND VILANTEROL TRIFENATATE 1 PUFF: 200; 25 POWDER RESPIRATORY (INHALATION) at 08:16

## 2024-02-29 RX ADMIN — PREGABALIN 200 MG: 100 CAPSULE ORAL at 23:16

## 2024-02-29 RX ADMIN — PREGABALIN 200 MG: 100 CAPSULE ORAL at 08:10

## 2024-02-29 RX ADMIN — BUPROPION HYDROCHLORIDE 300 MG: 150 TABLET, FILM COATED, EXTENDED RELEASE ORAL at 08:10

## 2024-02-29 RX ADMIN — CEFPODOXIME PROXETIL 400 MG: 200 TABLET, FILM COATED ORAL at 16:02

## 2024-02-29 RX ADMIN — Medication 1 TABLET: at 08:10

## 2024-02-29 RX ADMIN — OXYCODONE HYDROCHLORIDE 5 MG: 5 TABLET ORAL at 16:02

## 2024-02-29 RX ADMIN — METHOCARBAMOL 250 MG: 500 TABLET ORAL at 13:26

## 2024-02-29 RX ADMIN — ACETAMINOPHEN 975 MG: 325 TABLET, FILM COATED ORAL at 06:42

## 2024-02-29 RX ADMIN — ASPIRIN 81 MG CHEWABLE TABLET 81 MG: 81 TABLET CHEWABLE at 08:10

## 2024-02-29 RX ADMIN — SENNOSIDES, DOCUSATE SODIUM 1 TABLET: 8.6; 5 TABLET ORAL at 17:54

## 2024-02-29 RX ADMIN — INSULIN LISPRO 1 UNITS: 100 INJECTION, SOLUTION INTRAVENOUS; SUBCUTANEOUS at 22:00

## 2024-02-29 RX ADMIN — CLONAZEPAM 1 MG: 1 TABLET ORAL at 17:54

## 2024-02-29 RX ADMIN — PREGABALIN 200 MG: 100 CAPSULE ORAL at 16:02

## 2024-02-29 RX ADMIN — METHOCARBAMOL 250 MG: 500 TABLET ORAL at 06:42

## 2024-02-29 RX ADMIN — POLYETHYLENE GLYCOL 3350 17 G: 17 POWDER, FOR SOLUTION ORAL at 08:10

## 2024-02-29 RX ADMIN — MAGNESIUM SULFATE HEPTAHYDRATE 2 G: 40 INJECTION, SOLUTION INTRAVENOUS at 13:26

## 2024-02-29 RX ADMIN — PANTOPRAZOLE SODIUM 40 MG: 40 TABLET, DELAYED RELEASE ORAL at 06:42

## 2024-02-29 RX ADMIN — METHOCARBAMOL 250 MG: 500 TABLET ORAL at 22:00

## 2024-02-29 RX ADMIN — SENNOSIDES, DOCUSATE SODIUM 1 TABLET: 8.6; 5 TABLET ORAL at 08:10

## 2024-02-29 RX ADMIN — ACETAMINOPHEN 975 MG: 325 TABLET, FILM COATED ORAL at 13:26

## 2024-02-29 RX ADMIN — RIVAROXABAN 2.5 MG: 2.5 TABLET, FILM COATED ORAL at 17:54

## 2024-02-29 RX ADMIN — ACETAMINOPHEN 975 MG: 325 TABLET, FILM COATED ORAL at 22:01

## 2024-02-29 RX ADMIN — OXYCODONE HYDROCHLORIDE 5 MG: 5 TABLET ORAL at 01:12

## 2024-02-29 NOTE — PLAN OF CARE
Problem: PHYSICAL THERAPY ADULT  Goal: Performs mobility at highest level of function for planned discharge setting.  See evaluation for individualized goals.  Description: Treatment/Interventions: Functional transfer training, Therapeutic exercise, Endurance training, Gait training, Bed mobility, Equipment eval/education  Equipment Recommended: Wheelchair, Walker       See flowsheet documentation for full assessment, interventions and recommendations.  Outcome: Progressing  Note: Prognosis: Good     Assessment: Pt is 78 y.o. female seen for PT evaluation s/p admit to [facility] on 2/27/2024 w/ Diabetic foot ulcer with osteomyelitis (HCC). PT consulted to assess pt's functional mobility and d/c needs. Order placed for PT eval and tx, w/ NWB RLE order. Pt agreeable to PT  session upon arrival, pt found supine in bed.  PTA, pt was independent w/ all functional mobility w/ rollator, ambulates community distances and elevations, has 10 PATO, lives w/ family in 1 level home, and retired.  Pt to benefit from continued PT tx to address deficits and maximize level of functional independent mobility and consistency. Upon conclusion pt  seated in recliner. Complexity: Comorbidities affecting pt's physical performance at time of assessment include: COPD, DM, anxiety, depression, and skin graft . Personal factors affecting pt at time of IE include: limited mobility, steps to enter home, inability to ambulate household distances, depression, and anxiety. Please find objective findings from PT assessment regarding body systems outlined above with impairments and limitations including impaired balance, decreased endurance, decreased activity tolerance, decreased functional mobility tolerance, fall risk, orthopedic restrictions, and decreased skin integrity.  Pt's clinical presentation is currently unstable/unpredictable seen in pt's presentation of abnormal H&H, recent surgery, polypharmacy, wounds, and multiple  readmissions. The patient's AM-PAC Basic Mobility Inpatient Short Form Raw Score is 14.  Based on patient presentations and impairments, pt would most appropriately benefit from Level 1 resource intensity upon discharge. Please also refer to the recommendation of the Physical Therapist for safe discharge planning. RN verbalized pt appropriate for PT session. Pt seen as a co-eval with OT due to the patient's co-morbidities, clinically unstable presentation, and present impairments which are a regression from the patient's baseline.  Barriers to Discharge: Inaccessible home environment, Decreased caregiver support     Rehab Resource Intensity Level, PT: I (Maximum Resource Intensity)    See flowsheet documentation for full assessment.

## 2024-02-29 NOTE — ASSESSMENT & PLAN NOTE
Continue ASA, statin, Xarelto  Vascular managing right groin wound. Right groing wound vac removed as above, and recs for wound care

## 2024-02-29 NOTE — QUICK NOTE
"Post Op Check Note - Surgery Resident  Lona Cedeno 78 y.o. female MRN: [id number]  Unit/Bed#: NW8 876-01 Encounter: [id number]    ASSESSMENT:  Lona Cedeno is a 78 y.o. female who is status post wound vac removal, dressing application with silver alginate    Subjective: Patient reports feeling well with no concerns    Physical Exam:  GEN: NAD  CV: RRR  Lung: Normal effort  Ab: Soft, NT/ND  Neuro: A+Ox3  Incisions: dressing c/d/i    Blood pressure 121/51, pulse 73, temperature 97.5 °F (36.4 °C), temperature source Oral, resp. rate 16, height 5' 1\" (1.549 m), weight 69.4 kg (153 lb), SpO2 94%.,Body mass index is 28.91 kg/m².    No intake or output data in the 24 hours ending 02/28/24 2013    Invasive Devices       Peripheral Intravenous Line  Duration             Peripheral IV 02/27/24 Left;Ventral (anterior) Forearm 1 day              Drain  Duration             Ureteral Internal Stent Left ureter 6 Fr. 100 days                    VTE Pharmacologic Prophylaxis: Heparin            "

## 2024-02-29 NOTE — PLAN OF CARE
Problem: OCCUPATIONAL THERAPY ADULT  Goal: Performs self-care activities at highest level of function for planned discharge setting.  See evaluation for individualized goals.  Description: Treatment Interventions: ADL retraining, Functional transfer training, Endurance training, Patient/family training, Equipment evaluation/education, Compensatory technique education, Continued evaluation, Energy conservation, Activityengagement          See flowsheet documentation for full assessment, interventions and recommendations.   Outcome: Progressing  Note: Limitation: Decreased ADL status, Decreased endurance, Decreased self-care trans, Decreased high-level ADLs  Prognosis: Good  Assessment: Pt is a 77 y/o female that was admitted to [facility] 2/27/2024 with diabetic R foot ulcer with osteomyelitis, pt s/p STSG application 2/28/2024. Pt [place]WO[place] GILES. Pt  has a past medical history of Anxiety, Closed fracture of coccyx (HCC), Diabetes mellitus ([facility]), GERD (gastroesophageal reflux disease), Hyperlipidemia, Hypertension, IBS (irritable bowel syndrome), and Shoulder fracture. . Pt lives with daughter and spouse, pt provides assistance to them at baseline. Pt in a one level home with 5+5 PATO, walk in shower with grab bars and a shower chair, and a raised toilet. Pt reports using rollator for mobility. At baseline pt (I) ADLs, IADLs, and functional mobility. Pt currently requires MOD A to complete LB ADLs and toileting. Pt requires MIN A for UB ADLs, grooming tasks, functional transfers, and functional mobility to slide foot from bed to bedside chair with rw. Pt limited by decreased ADL status, functional transfers, and functional mobility. Pt supine in bed at begning of session, pt seated in bedside chair at end of session with alarm set and items within reach. The patient's raw score on the AM-PAC Daily Activity Inpatient Short Form is 17. A raw score of less than 19 suggests the patient may benefit from discharge to  post-acute rehabilitation services. Please refer to the recommendation of the Occupational Therapist for safe discharge planning. Recommend level I maximum intensity OT services at d/c to maximize pt function.     Rehab Resource Intensity Level, OT: I (Maximum Resource Intensity)

## 2024-02-29 NOTE — OCCUPATIONAL THERAPY NOTE
Occupational Therapy Evaluation     Patient Name: Lona Cedeno  Today's Date: 2024  Problem List  Principal Problem:    Diabetic foot ulcer with osteomyelitis ([facility])  Active Problems:    Type 2 diabetes mellitus with diabetic polyneuropathy ([facility])    Anxiety and depression    GERD without esophagitis    Hypomagnesemia    PAD (peripheral artery disease) ([facility])    COPD, severity to be determined ([facility])    Tobacco use disorder    Surgical site infection    Sacral wound    Past Medical History  Past Medical History:   Diagnosis Date    Anxiety     Closed fracture of coccyx ([facility]) 2023    Diabetes mellitus ([facility])     GERD (gastroesophageal reflux disease)     Hyperlipidemia     Hypertension     IBS (irritable bowel syndrome)     Shoulder fracture      Past Surgical History  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;  Surgeon: Cristian Child MD;  Location: [facility];  Service: Urology    EVACUATION OF HEMATOMA Right 2024    Procedure: EVACUATION/ DRAINAGE CHEST WALL  HEMATOMA;  Surgeon: Seth Hoyos MD;  Location: [facility];  Service: Vascular    FL RETROGRADE PYELOGRAM  2023    HERNIA REPAIR      umbilicus w/ mesh    DC BYPASS W/VEIN AXILLARY-FEMORAL Right 2024    Procedure: BYPASS AXILLO-FEMORAL, RIGHT WITH 8MM RINGED PTFE GRAFT;  Surgeon: Seth Hoyos MD;  [facility]: [facility];  Service: Vascular    SPLIT THICKNESS SKIN GRAFT Right 2024    Procedure: SKIN GRAFT SPLIT THICKNESS (STSG)   EXTREMITY, Wound vac dressing change;  Surgeon: Rigo Yeboah DPM;  [facility]: [facility];  Service: Podiatry    WOUND DEBRIDEMENT Right 2/4/2024    Procedure: RIGHT WOUND AND ACHILLES DEBRIDEMENT FOOT/TOE (WASH OUT); PARTIAL AMPUTATION DISTAL 2ND TOE;  Surgeon: Aaron Montero DPM;  [facility];  Service: Podiatry    WOUND DEBRIDEMENT Right 2/14/2024    Procedure: DEBRIDEMENT RIGHT GROIN  WOUND AND WASHOUT, APPLICATION OF WOUND VAC;  Surgeon: Suly Muro DO;  [facility];  Service: Vascular    WOUND DEBRIDEMENT Right 2/19/2024    Procedure: DEBRIDEMENT LOWER EXTREMITY, washout;  Surgeon: Suly Muro DO;  [facility];  Service: Vascular           02/29/24 0936   OT Last Visit   OT Visit Date 02/29/24   Note Type   Note type Evaluation   Pain Assessment   Pain Assessment Tool 0-10   Pain Score No Pain   Restrictions/Precautions   Weight Bearing Precautions Per Order Yes   RLE Weight Bearing Per Order NWB   Other Precautions Contact/isolation;Chair Alarm;Bed Alarm;WBS;Fall Risk  (wound vac)   Home Living   Type of Home House   Home Layout One level;Stairs to enter with rails  (5+5 PATO)   Bathroom Shower/Tub Walk-in shower   Bathroom Toilet Raised   Bathroom Equipment Grab bars in shower;Shower chair   Home Equipment Walker;Cane  (Rollator. Pt reports using the rollator at baseline)   Prior Function   Level of [place] Independent with ADLs;Independent with functional mobility;Independent with IADLS   Lives With Spouse;Daughter  (Pt assists spouse (dx parkinson's) and daughter (mentally challenged))   Receives Help From Family   IADLs Independent with driving;Independent with meal prep;Independent with medication management   Falls in the last 6 months 0   Vocational Retired   Lifestyle   Autonomy Pt reports (I) with ADLs, IADLs and functional mobility with rollator. Pt +   Reciprocal Relationships Family   Service to Others retired   ADL   Where Assessed Edge of bed    Eating Assistance 5  Supervision/Setup   Grooming Assistance 4  Minimal Assistance   UB Bathing Assistance 4  Minimal Assistance   LB Bathing Assistance 3  Moderate Assistance   UB Dressing Assistance 4  Minimal Assistance   LB Dressing Assistance 3  Moderate Assistance   Toileting Assistance  3  Moderate Assistance   Functional Assistance 4  Minimal Assistance   Bed Mobility   Supine to Sit 5  Supervision   Additional items HOB elevated;Bedrails;Increased time required   Transfers   Sit to Stand 4  Minimal assistance   Additional items Assist x 1;Increased time required;Verbal cues   Stand to Sit 4  Minimal assistance   Additional items Assist x 1;Increased time required;Verbal cues   Additional Comments with rw, pt able to maintain WBS   Functional Mobility   Functional Mobility 4  Minimal assistance   Additional Comments Pt requires MIN Ax1 and rw to slide L foot from bed to chair   Additional items Rolling walker   Balance   Static Sitting Fair +   Dynamic Sitting Fair   Static Standing Poor +   Dynamic Standing Poor +   Ambulatory Poor +   Activity Tolerance   Activity Tolerance Patient tolerated treatment well   Medical Staff Made Aware RN aware. PT Thalia present, pt/ot addressed separately   Nurse Made Aware Yes   RUE Assessment   RUE Assessment WFL   LUE Assessment   LUE Assessment WFL   Hand Function   Gross Motor Coordination Functional   Fine Motor Coordination Functional   Cognition   Overall Cognitive Status WFL   Arousal/Participation Alert;Responsive;Cooperative   Attention Within functional limits   Orientation Level Oriented X4   Memory Within functional limits   Following Commands Follows all commands and directions without difficulty   Comments Pt agreeable to therapy   Assessment   Limitation Decreased ADL status;Decreased endurance;Decreased self-care trans;Decreased high-level ADLs   Prognosis Good   Assessment Pt is a 77 y/o female that was [facility] with  diabetic R foot ulcer with osteomyelitis, pt s/p STSG application 2/28/2024. Pt NWB R TISHA. Pt  has a past medical history of Anxiety, Closed fracture of coccyx (HCC), Diabetes mellitus (HCC), GERD (gastroesophageal reflux disease), Hyperlipidemia, Hypertension, IBS (irritable bowel syndrome), and Shoulder fracture. . Pt lives with daughter and spouse, pt provides assistance to them at baseline. Pt in a one level home with 5+5 PATO, walk in shower with grab bars and a shower chair, and a raised toilet. Pt reports using rollator for mobility. At baseline pt (I) ADLs, IADLs, and functional mobility. Pt currently requires MOD A to complete LB ADLs and toileting. Pt requires MIN A for UB ADLs, grooming tasks, functional transfers, and functional mobility to slide foot from bed to bedside chair with rw. Pt limited by decreased ADL status, functional transfers, and functional mobility. Pt supine in bed at begning of session, pt seated in bedside chair at end of session with alarm set and items within reach. The patient's raw score on the AM-PAC Daily Activity Inpatient Short Form is 17. A raw score of less than 19 suggests the patient may benefit from discharge to post-acute rehabilitation services. Please refer to the recommendation of the Occupational Therapist for safe discharge planning. Recommend level I maximum intensity OT services at d/c to maximize pt function.   Goals   Patient Goals To go home   LTG Time Frame 10-14   Plan   Treatment Interventions ADL retraining;Functional transfer training;Endurance training;Patient/family training;Equipment evaluation/education;Compensatory technique education;Continued evaluation;Energy conservation;Activityengagement   Goal Expiration Date 03/14/24   OT Frequency 2-3x/wk   Discharge Recommendation   Rehab Resource Intensity Level, OT I (Maximum Resource Intensity)   -PAC Daily Activity Inpatient   Lower Body Dressing 2   Bathing 2   Toileting 2   Upper Body Dressing 3    Grooming 4   Eating 4   Daily Activity Raw Score 17   Daily Activity Standardized Score (Calc for Raw Score >=11) 37.26   AM-PAC Applied Cognition Inpatient   Following a Speech/Presentation 4   Understanding Ordinary Conversation 4   Taking Medications 4   Remembering Where Things Are Placed or Put Away 4   Remembering List of 4-5 Errands 3   Taking Care of Complicated Tasks 3   Applied Cognition Raw Score 22   Applied Cognition Standardized Score 47.83   End of Consult   Education Provided Yes   Patient Position at End of Consult Bedside chair;Bed/Chair alarm activated;All needs within reach   Nurse Communication Nurse aware of consult     Goals:    Pt will complete functional transfers with MOD IND and appropriate AD to maximize pt safety.    Pt will complete bed mobility with MOD IND  to maximize pt safety.    Pt will complete LB bathing with MOD IND  to maximize pt independence.    Pt will complete UB bathing with MOD IND to maximize pt independence.    Pt will complete toileting with MOD IND to maximize pt independence.    Pt will complete functional household distance mobility with MOD IND and appropriate AD to maximize pt safety        MARTIN Morley, OTR/L

## 2024-02-29 NOTE — PLAN OF CARE
Problem: PAIN - ADULT  Goal: Verbalizes/displays adequate comfort level or baseline comfort level  Description: Interventions:  - Encourage patient to monitor pain and request assistance  - Assess pain using appropriate pain scale  - Administer analgesics based on type and severity of pain and evaluate response  - Implement non-pharmacological measures as appropriate and evaluate response  - Consider cultural and social influences on pain and pain management  - Notify physician/advanced practitioner if interventions unsuccessful or patient reports new pain  Outcome: Progressing     Problem: INFECTION - ADULT  Goal: Absence or prevention of progression during hospitalization  Description: INTERVENTIONS:  - Assess and monitor for signs and symptoms of infection  - Monitor lab/diagnostic results  - Monitor all insertion sites, i.e. indwelling lines, tubes, and drains  - Monitor endotracheal if appropriate and nasal secretions for changes in amount and color  - [place] appropriate cooling/warming therapies per order  - Administer medications as ordered  - Instruct and encourage patient and family to use good hand hygiene technique  - Identify and instruct in appropriate isolation precautions for identified infection/condition  Outcome: Progressing  Goal: Absence of fever/infection during neutropenic period  Description: INTERVENTIONS:  - Monitor WBC    Outcome: Progressing     Problem: SAFETY ADULT  Goal: Patient will remain free of falls  Description: INTERVENTIONS:  - Educate patient/family on patient safety including physical limitations  - Instruct patient to call for assistance with activity   - Consult OT/PT to assist with strengthening/mobility   - Keep Call bell within reach  - Keep bed low and locked with side rails adjusted as appropriate  - Keep care items and personal belongings within reach  - Initiate and maintain comfort rounds  - Make Fall Risk Sign visible to staff  - Offer Toileting every 4 Hours,  in advance of need  - Initiate/Maintain 4alarm  - Obtain necessary fall risk management equipment: 4  - Apply yellow socks and bracelet for high fall risk patients  - Consider moving patient to room near nurses station  Outcome: Progressing  Goal: Maintain or return to baseline ADL function  Description: INTERVENTIONS:  -  Assess patient's ability to carry out ADLs; assess patient's baseline for ADL function and identify physical deficits which impact ability to perform ADLs (bathing, care of mouth/teeth, toileting, grooming, dressing, etc.)  - Assess/evaluate cause of self-care deficits   - Assess range of motion  - Assess patient's mobility; develop plan if impaired  - Assess patient's need for assistive devices and provide as appropriate  - Encourage maximum independence but intervene and supervise when necessary  - Involve family in performance of ADLs  - Assess for home care needs following discharge   - Consider OT consult to assist with ADL evaluation and planning for discharge  - Provide patient education as appropriate  Outcome: Progressing  Goal: Maintains/Returns to pre admission functional level  Description: INTERVENTIONS:  - Perform AM-PAC 6 Click Basic Mobility/ Daily Activity assessment daily.  - Set and communicate daily mobility goal to care team and patient/family/caregiver.   - Collaborate with rehabilitation services on mobility goals if consulted  - Perform Range of Motion 4 times a day.  - Reposition patient every 4 hours.  - Dangle patient 4 times a day  - Stand patient 4 times a day  - Ambulate patient 4 times a day  - Out of bed to chair 4 times a day   - Out of bed for meals 4 times a day  - Out of bed for toileting  - Record patient progress and toleration of activity level   Outcome: Progressing     Problem: DISCHARGE PLANNING  Goal: Discharge to home or other facility with appropriate resources  Description: INTERVENTIONS:  - Identify barriers to discharge w/patient and caregiver  -  Arrange for needed discharge resources and transportation as appropriate  - Identify discharge learning needs (meds, wound care, etc.)  - Arrange for interpretive services to assist at discharge as needed  - Refer to Case Management Department for coordinating discharge planning if the patient needs post-hospital services based on physician/advanced practitioner order or complex needs related to functional status, cognitive ability, or social support system  Outcome: Progressing     Problem: Knowledge Deficit  Goal: Patient/family/caregiver demonstrates understanding of disease process, treatment plan, medications, and discharge instructions  Description: Complete learning assessment and assess knowledge base.  Interventions:  - Provide teaching at level of understanding  - Provide teaching via preferred learning methods  Outcome: Progressing     Problem: Prexisting or High Potential for Compromised Skin Integrity  Goal: Skin integrity is maintained or improved  Description: INTERVENTIONS:  - Identify patients at risk for skin breakdown  - Assess and monitor skin integrity  - Assess and monitor nutrition and hydration status  - Monitor labs   - Assess for incontinence   - Turn and reposition patient  - Assist with mobility/ambulation  - Relieve pressure over bony prominences  - Avoid friction and shearing  - Provide appropriate hygiene as needed including keeping skin clean and dry  - Evaluate need for skin moisturizer/barrier cream  - Collaborate with interdisciplinary team   - Patient/family teaching  - Consider wound care consult   Outcome: Progressing     Problem: MOBILITY - ADULT  Goal: Maintain or return to baseline ADL function  Description: INTERVENTIONS:  -  Assess patient's ability to carry out ADLs; assess patient's baseline for ADL function and identify physical deficits which impact ability to perform ADLs (bathing, care of mouth/teeth, toileting, grooming, dressing, etc.)  - Assess/evaluate cause of  self-care deficits   - Assess range of motion  - Assess patient's mobility; develop plan if impaired  - Assess patient's need for assistive devices and provide as appropriate  - Encourage maximum independence but intervene and supervise when necessary  - Involve family in performance of ADLs  - Assess for home care needs following discharge   - Consider OT consult to assist with ADL evaluation and planning for discharge  - Provide patient education as appropriate  Outcome: Progressing  Goal: Maintains/Returns to pre admission functional level  Description: INTERVENTIONS:  - Perform AM-PAC 6 Click Basic Mobility/ Daily Activity assessment daily.  - Set and communicate daily mobility goal to care team and patient/family/caregiver.   - Collaborate with rehabilitation services on mobility goals if consulted  - Perform Range of Motion 4 times a day.  - Reposition patient every 4 hours.  - Dangle patient 4 times a day  - Stand patient 4 times a day  - Ambulate patient 4 times a day  - Out of bed to chair 4 times a day   - Out of bed for meals 4 times a day  - Out of bed for toileting  - Record patient progress and toleration of activity level   Outcome: Progressing

## 2024-02-29 NOTE — PROGRESS NOTES
"[facility] Podiatry - Progress Note  Patient: Lona Cedeno 78 y.o. female   MRN: [id number]  PCP: Vivek Preston DO  Unit/Bed#: NW8 876-01 Encounter: [id number]  Date Of Visit: 24    ASSESSMENT:    Lona Cedeno is a 78 y.o. female with:    Right posterior ankle ulcer with exposed achilles tendon  - Right wound debridement (DOS: 24)  - Right STSG application (DOS: 24)  Right medial ankle ulcer, limited to breakdown of skin  Osteomyelitis of right second toe  - Right 2nd toe amputation (DOS: 24)  T2DM  PAD  Tobacco use disorder  Diabetic polyneuropathy    PLAN:    Post-surgical wound vac dressing located on right lower extremity was left intact today.  Wound VAC running at 125 mmHg without incident, will leave intact until Monday, 3/4  Plan for first post operative dressing change with podiatry on Monday, 3/4. Patient is stable from podiatry standpoint to be transferred to [facility], no future surgical plans.  Donor site changed, surgical site is stable with no acute clinical signs of infection  Vitals signs stable. Patient afebrile with no leukocytosis. No erythema, edema, or lymphangitis noted either proximal or distal to the dressings.  Pain is well controlled. Continue current pain management regimen.  Continue non-weight bearing to affected extremity with elevation while non-ambulatory.  Rest of care per primary team.      SUBJECTIVE:     The patient was seen, evaluated, and assessed at bedside today. The patient was awake, alert, and in no acute distress. The patient reports minimal to no pain at this time. Pain is well controlled with current pain management regimen. Patient reports normal appetite. Patient denies N/V/F/chills/SOB/CP.      OBJECTIVE:     Vitals:   /56 (BP Location: Right arm)   Pulse 65   Temp 97.6 °F (36.4 °C) (Oral)   Resp 20   Ht 5' 1\" (1.549 m)   Wt 69.4 kg (153 lb)   SpO2 95%   BMI 28.91 kg/m²     Temp (24hrs), Av.7 °F (36.5 °C), Min:97.5 °F (36.4 " "°C), Max:98.3 °F (36.8 °C)    Physical Exam  Cardiovascular status at baseline. Neurological status at baseline.     Right lower extremity: wound vac in place running at 125 mmHg without incident, no strikethrough of wound vac dressings. No erythema, edema, or lymphangitis noted from dressing. lower extremity temperature WNL.    Wound #: 1  Location: right medial calf, STSG donor site  Length 7cm: Width 4cm: Depth 0.1cm:   Deepest Tissue Noted in Base: dermis  Probe to Bone: No  Peripheral Skin Description: Attached  Granulation: 100% Fibrotic Tissue: 0% Necrotic Tissue: 0%   Drainage Amount: minimal, bloody  Signs of Infection: No    Clinical Images:      Additional Data:     Labs:    Results from last 7 days   Lab Units 02/29/24  0538   WBC Thousand/uL 7.60   HEMOGLOBIN g/dL 11.1*   HEMATOCRIT % 36.3   PLATELETS Thousands/uL 299   NEUTROS PCT % 53   LYMPHS PCT % 25   MONOS PCT % 8   EOS PCT % 13*     Results from last 7 days   Lab Units 02/29/24  0538 02/28/24  0340   POTASSIUM mmol/L 4.1 4.1   CHLORIDE mmol/L 102 101   CO2 mmol/L 30 31   BUN mg/dL 16 17   CREATININE mg/dL 0.51* 0.61   CALCIUM mg/dL 9.5 10.1   ALK PHOS U/L  --  54   ALT U/L  --  4*   AST U/L  --  14           * I Have Reviewed All Lab Data Listed Above.                Imaging: I have personally reviewed pertinent post-operative films in PACS:  EKG, Pathology, and Other Studies: I have personally reviewed pertinent reports.      ** Please Note: Portions of the record may have been created with voice recognition software. Occasional wrong word or \"sound a like\" substitutions may have occurred due to the inherent limitations of voice recognition software. Read the chart carefully and recognize, using context, where substitutions have occurred. **          "

## 2024-02-29 NOTE — PROGRESS NOTES
[facility]  Progress Note  Name: Lona Cedeno I  MRN: [id number]  Unit/Bed#: NW8 876-01 I Date of Admission: 2/27/2024   Date of Service: 2/29/2024 I Hospital Day: 2    Assessment/Plan   * Diabetic foot ulcer with osteomyelitis (HCC)  Assessment & Plan  Right posterior ankle ulcer with exposed Achilles tendon s/p wound debridement on 2/4/24  Osteomyelitis of right second toe s/p amputation on 2/4/24  Transferred from [facility] for STSG. S/p STSG application 2/28/24 by Podiatry  Wound care per Podiatry       Surgical site infection  Assessment & Plan  Hospitalized from 2/13/24 to 2/23/24 for right groin surgical wound infection from previous RLE bypass  S/p debridement on 2/14/2024 and washout on 2/19/2024  Wound cultures - Klebsiella and Proteus  S/p removal of vac by vascular surgery on 2/28  ID recommended continuation of antibiotics through 3/22/2024 due to proximity of wound infection to the bypass graft - on Cefpodoxime 400 mg twice daily    Sacral wound  Assessment & Plan  Noted to have unstageable wound on the sacrum on initial admission  Continue wound care    Type 2 diabetes mellitus with diabetic polyneuropathy (HCC)  Assessment & Plan  Lab Results   Component Value Date    HGBA1C 6.0 (H) 02/28/2024       Recent Labs     02/28/24  1600 02/28/24  2047 02/29/24  0807 02/29/24  1153   POCGLU 104 171* 110 150*         Blood Sugar Average: Last 72 hrs:  (P) 137.5  Continue SSI    PAD (peripheral artery disease) ([facility])  Assessment & Plan  Continue ASA, statin, Xarelto  Vascular managing right groin wound. Right groing wound vac removed as above, and recs for wound care     COPD, severity to be determined ([facility])  Assessment & Plan  No exacerbation  Home Advair replaced by equivalent formulary Breo  Continue albuterol as needed    Anxiety and depression  Assessment & Plan  Continue Wellbutrin  mg daily, Clonazepam 1 mg daily    GERD without esophagitis  Assessment &  Plan  Continue PPI    Hypomagnesemia  Assessment & Plan  Replete and monitor    Tobacco use disorder  Assessment & Plan  Continue nicotine patch  Encourage Smoking cessation            VTE Pharmacologic Prophylaxis: VTE Score: 4 Moderate Risk (Score 3-4) - Pharmacological DVT Prophylaxis Ordered: rivaroxaban (Xarelto).    Mobility:   Basic Mobility Inpatient Raw Score: 14  -HLM Goal: 4: Move to chair/commode  JH-HLM Achieved: 2: Bed activities/Dependent transfer  HLM Goal NOT achieved. Continue with multidisciplinary rounding and encourage appropriate mobility to improve upon HLM goals.    Patient Centered Rounds: I performed bedside rounds with nursing staff today.   Discussions with Specialists or Other Care Team Provider:     Education and Discussions with Family / Patient: Patient declined call to .     Total Time Spent on Date of Encounter in care of patient: 35 mins. This time was spent on one or more of the following: performing physical exam; counseling and coordination of care; obtaining or reviewing history; documenting in the medical record; reviewing/ordering tests, medications or procedures; communicating with other healthcare professionals and discussing with patient's family/caregivers.    Current Length of Stay: 2 day(s)  Current Patient Status: Inpatient   Certification Statement: The patient will continue to require additional inpatient hospital stay due to safe d/c planning   Discharge Plan: Anticipate discharge in 24-48 hrs to [facility]    Code Status: Level 1 - Full Code    Subjective:   Ms. Cedeno reports that her pain is controlled. Denies constipation. Denies fever, chills, CP, SOB, abdominal pain. Reports she was a little dizzy when she got up this AM but this resolved     Objective:     Vitals:   Temp (24hrs), Av.7 °F (36.5 °C), Min:97.5 °F (36.4 °C), Max:98.3 °F (36.8 °C)    Temp:  [97.5 °F (36.4 °C)-98.3 °F (36.8 °C)] 97.6 °F (36.4 °C)  HR:  [65-75] 65  Resp:  [13-20] 20  BP:  (108-121)/(50-65) 121/56  SpO2:  [93 %-96 %] 95 %  Body mass index is 28.91 kg/m².     Input and Output Summary (last 24 hours):     Intake/Output Summary (Last 24 hours) at 2/29/2024 1247  Last data filed at 2/29/2024 0833  Gross per 24 hour   Intake 240 ml   Output 1250 ml   Net -1010 ml       Physical Exam:   Physical Exam  Vitals reviewed.   Constitutional:       Comments: Patient seen sitting in bedside chair, NAD   Cardiovascular:      Rate and Rhythm: Normal rate and regular rhythm.   Pulmonary:      Effort: Pulmonary effort is normal. No respiratory distress.      Breath sounds: Normal breath sounds.   Abdominal:      General: Bowel sounds are normal.      Palpations: Abdomen is soft.      Tenderness: There is no abdominal tenderness.   Musculoskeletal:      Comments: Right foot boot   Skin:     General: Skin is warm.   Neurological:      Mental Status: She is alert and oriented to person, place, and time.   Psychiatric:         Mood and Affect: Mood normal.         Behavior: Behavior normal.          Additional Data:     Labs:  Results from last 7 days   Lab Units 02/29/24  0538   WBC Thousand/uL 7.60   HEMOGLOBIN g/dL 11.1*   HEMATOCRIT % 36.3   PLATELETS Thousands/uL 299   NEUTROS PCT % 53   LYMPHS PCT % 25   MONOS PCT % 8   EOS PCT % 13*     Results from last 7 days   Lab Units 02/29/24  0538 02/28/24  0340   SODIUM mmol/L 140 140   POTASSIUM mmol/L 4.1 4.1   CHLORIDE mmol/L 102 101   CO2 mmol/L 30 31   BUN mg/dL 16 17   CREATININE mg/dL 0.51* 0.61   ANION GAP mmol/L 8 8   CALCIUM mg/dL 9.5 10.1   ALBUMIN g/dL  --  3.7   TOTAL BILIRUBIN mg/dL  --  0.27   ALK PHOS U/L  --  54   ALT U/L  --  4*   AST U/L  --  14   GLUCOSE RANDOM mg/dL 117 128         Results from last 7 days   Lab Units 02/29/24  1153 02/29/24  0807 02/28/24  2047 02/28/24  1600 02/28/24  1519 02/28/24  1129 02/28/24  0735 02/27/24  2055 02/27/24  1037 02/27/24  0624 02/26/24  2104 02/26/24  1553   POC GLUCOSE mg/dl 150* 110 171* 104 108  125 147* 185* 113 118 115 99     Results from last 7 days   Lab Units 02/28/24  0438   HEMOGLOBIN A1C % 6.0*           Lines/Drains:  Invasive Devices       Peripheral Intravenous Line  Duration             Peripheral IV 02/27/24 Left;Ventral (anterior) Forearm 1 day              Drain  Duration             Ureteral Internal Stent Left ureter 6 Fr. 101 days                          Imaging: Reviewed radiology reports from this admission including: chest xray    Recent Cultures (last 7 days):         Last 24 Hours Medication List:   Current Facility-Administered Medications   Medication Dose Route Frequency Provider Last Rate    acetaminophen  975 mg Oral Q8H JILLIAN Mary Whyte DPM      albuterol  2 puff Inhalation Q6H PRN Mary Whyte DPM      aspirin  81 mg Oral Daily Mary Whyte DPM      buPROPion  300 mg Oral Daily Mary Whyte DPM      cefpodoxime  400 mg Oral BID With Meals Mary Whyte DPM      clonazePAM  1 mg Oral QPM Mary Whyte DPM      cyanocobalamin  1,000 mcg Oral Daily Mary Whyte DPM      fish oil  1,000 mg Oral Daily Mary Whyte DPM      fluticasone-vilanterol  1 puff Inhalation Daily Mary Whyte DPM      insulin lispro  1-5 Units Subcutaneous TID AC Mary Whyte DPM      insulin lispro  1-5 Units Subcutaneous HS Mary Whyte DPM      magnesium sulfate  2 g Intravenous Once Zafar Geronimo PA-C      methocarbamol  250 mg Oral Q8H [facility] Mary Whyte DPM      multivitamin-minerals  1 tablet Oral Daily Mary Whyte DPM      nicotine  1 patch Transdermal Daily Mary Whyte DPM      oxyCODONE  2.5 mg Oral Q4H PRN Mary Whyte DPM      Or    oxyCODONE  5 mg Oral Q4H PRN Mary Whyte DPM      pantoprazole  40 mg Oral Early Morning Mary Whyte DPM      polyethylene glycol  17 g Oral Daily Mary Whyte DPM      pregabalin  200 mg  Oral TID Mary Whyte DPM      rivaroxaban  2.5 mg Oral BID Kavya Lang MD      senna-docusate sodium  1 tablet Oral BID Mary Whyte DPM          Today, Patient Was Seen By: Zafar Geronimo PA-C    **Please Note: This note may have been constructed using a voice recognition system.**

## 2024-02-29 NOTE — ASSESSMENT & PLAN NOTE
Lab Results   Component Value Date    HGBA1C 6.0 (H) 02/28/2024       Recent Labs     02/28/24  1600 02/28/24  2047 02/29/24  0807 02/29/24  1153   POCGLU 104 171* 110 150*         Blood Sugar Average: Last 72 hrs:  (P) 137.5  Continue SSI

## 2024-02-29 NOTE — ASSESSMENT & PLAN NOTE
Right posterior ankle ulcer with exposed Achilles tendon s/p wound debridement on 2/4/24  Osteomyelitis of right second toe s/p amputation on 2/4/24  Transferred from [facility] for STSG. S/p STSG application 2/28/24 by Podiatry  Wound care per Podiatry

## 2024-02-29 NOTE — PLAN OF CARE
Problem: PAIN - ADULT  Goal: Verbalizes/displays adequate comfort level or baseline comfort level  Description: Interventions:  - Encourage patient to monitor pain and request assistance  - Assess pain using appropriate pain scale  - Administer analgesics based on type and severity of pain and evaluate response  - Implement non-pharmacological measures as appropriate and evaluate response  - Consider cultural and social influences on pain and pain management  - Notify physician/advanced practitioner if interventions unsuccessful or patient reports new pain  Outcome: Progressing     Problem: INFECTION - ADULT  Goal: Absence or prevention of progression during hospitalization  Description: INTERVENTIONS:  - Assess and monitor for signs and symptoms of infection  - Monitor lab/diagnostic results  - Monitor all insertion sites, i.e. indwelling lines, tubes, and drains  - Monitor endotracheal if appropriate and nasal secretions for changes in amount and color  - [place] appropriate cooling/warming therapies per order  - Administer medications as ordered  - Instruct and encourage patient and family to use good hand hygiene technique  - Identify and instruct in appropriate isolation precautions for identified infection/condition  Outcome: Progressing     Problem: SAFETY ADULT  Goal: Patient will remain free of falls  Description: INTERVENTIONS:  - Educate patient/family on patient safety including physical limitations  - Instruct patient to call for assistance with activity   - Consult OT/PT to assist with strengthening/mobility   - Keep Call bell within reach  - Keep bed low and locked with side rails adjusted as appropriate  - Keep care items and personal belongings within reach  - Initiate and maintain comfort rounds  - Make Fall Risk Sign visible to staff  - Offer Toileting every 2 Hours, in advance of need  - Initiate/Maintain bed/chair alarm  - Obtain necessary fall risk management equipment: slippers  - Apply  yellow socks and bracelet for high fall risk patients  - Consider moving patient to room near nurses station  Outcome: Progressing     Problem: DISCHARGE PLANNING  Goal: Discharge to home or other facility with appropriate resources  Description: INTERVENTIONS:  - Identify barriers to discharge w/patient and caregiver  - Arrange for needed discharge resources and transportation as appropriate  - Identify discharge learning needs (meds, wound care, etc.)  - Arrange for interpretive services to assist at discharge as needed  - Refer to Case Management Department for coordinating discharge planning if the patient needs post-hospital services based on physician/advanced practitioner order or complex needs related to functional status, cognitive ability, or social support system  Outcome: Progressing     Problem: Knowledge Deficit  Goal: Patient/family/caregiver demonstrates understanding of disease process, treatment plan, medications, and discharge instructions  Description: Complete learning assessment and assess knowledge base.  Interventions:  - Provide teaching at level of understanding  - Provide teaching via preferred learning methods  Outcome: Progressing

## 2024-02-29 NOTE — PROGRESS NOTES
02/29/24 1609   Hello, [Guardian’s Name / Patient’s Name], this is [Caller Name] from [facility], and our clinical care team wanted to check on you / your child after your recent visit to the hospital. It will only take 3-5 minutes. Is this a good time?   Discharge Call Type/ Specific Diagnosis: General Call   Call Complete   Attempted Number of Calls 1   Discharge phone call complete? Does not meet criteria  (DC to acute hospital)

## 2024-02-29 NOTE — PHYSICAL THERAPY NOTE
PHYSICAL THERAPY EVALUATION  NAME:  Lona Cedeno  DATE: 24    AGE:   78 y.o.  Mrn:   [id number]  ADMIT DX:  Surgical site infection [T81.49XA]  Problem List:   Patient Active Problem List   Diagnosis    Type 2 diabetes mellitus with diabetic polyneuropathy (HCC)    Essential hypertension    Anxiety and depression    GERD without esophagitis    Urinary incontinence    Degenerative lumbar disc    Lactic acidosis    Hypomagnesemia    Bladder neoplasm    Left renal mass    PAD (peripheral artery disease) (HCC)    Abnormal CT of the chest    COPD, severity to be determined ([facility])    Tobacco use disorder    Age-related osteoporosis without current pathological fracture    Ankle ulcer, right, with fat layer exposed (HCC)    Diabetic foot ulcer with osteomyelitis ([facility])    Preoperative cardiovascular examination    Constipation    Hematoma    Anemia    At risk for venous thromboembolism (VTE)    At high risk for skin breakdown    Wound of skin    Impaired mobility and activities of daily living    Acute pain due to injury    Leukocytosis    Surgical site infection    Sepsis without acute organ dysfunction (HCC)    Other dietary vitamin B12 deficiency anemia    Sacral wound    Impulsive       Past Medical History  Past Medical History:   Diagnosis Date    Anxiety     Closed fracture of coccyx ([facility]) 2023    Diabetes mellitus (HCC)     GERD (gastroesophageal reflux disease)     Hyperlipidemia     Hypertension     IBS (irritable bowel syndrome)     Shoulder fracture        Past Surgical History  Past Surgical History:   Procedure Laterality Date    ANKLE FRACTURE SURGERY Right     has screw in place     SECTION      x2    CHOLECYSTECTOMY      CYSTOSCOPY W/ LASER LITHOTRIPSY Left 2023    Procedure: CYSTOSCOPY; RETROGRADE; URETEROSCOPY; BIOPSY; LEFT -INSERTION OF STENT;  Surgeon: Cristian Child MD;  Location: [facility];  Service: Urology    CYSTOSCOPY W/ LASER LITHOTRIPSY Left 2023     Procedure: CYSTOSCOPY; RETROGRADE; URETEROSCOPY; 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;  Surgeon: Cristian Child MD;  Location: [facility];  Service: Urology    EVACUATION OF HEMATOMA Right 2/4/2024    Procedure: EVACUATION/ DRAINAGE CHEST WALL  HEMATOMA;  Surgeon: Seth Hoyos MD;  Location: [facility];  Service: Vascular    FL RETROGRADE PYELOGRAM  9/18/2023    HERNIA REPAIR      umbilicus w/ mesh    IA BYPASS W/VEIN AXILLARY-FEMORAL Right 1/31/2024    Procedure: BYPASS AXILLO-FEMORAL, RIGHT WITH 8MM RINGED PTFE GRAFT;  Surgeon: Seth Hoyos MD;  [facility];  Service: Vascular    SPLIT THICKNESS SKIN GRAFT Right 2/28/2024    Procedure: SKIN GRAFT SPLIT THICKNESS (STSG)  EXTREMITY, Wound vac dressing change;  Surgeon: Rigo Yeboah DPM;  [facility]: [facility];  Service: Podiatry    WOUND DEBRIDEMENT Right 2/4/2024    Procedure: RIGHT WOUND AND ACHILLES DEBRIDEMENT FOOT/TOE (WASH OUT); PARTIAL AMPUTATION DISTAL 2ND TOE;  Surgeon: Aaron Montero DPM;  [facility];  Service: Podiatry    WOUND DEBRIDEMENT Right 2/14/2024    Procedure: DEBRIDEMENT RIGHT GROIN  WOUND AND WASHOUT, APPLICATION OF WOUND VAC;  Surgeon: Suyl Muro DO;  [facility];  Service: Vascular    WOUND DEBRIDEMENT Right 2/19/2024    Procedure: DEBRIDEMENT LOWER EXTREMITY, washout;  Surgeon: Suly Muro DO;  [facility];  Service: Vascular       Length Of Stay: 2  Performed at least 2 patient identifiers during session: Name and ID bracelet         02/29/24 0937   PT Last Visit   PT Visit Date 02/29/24   Note Type   Note type Evaluation   Pain Assessment   Pain Assessment Tool 0-10   Pain Score No Pain   Restrictions/Precautions   Weight Bearing Precautions Per Order Yes   RLE Weight Bearing Per Order NWB   Other Precautions Contact/isolation;Chair Alarm;Bed Alarm;WBS;Fall Risk  (wound vac)   Home Living   Type of Home House   Home Layout One level;Stairs  to enter with rails  (5+5 PATO)   Bathroom Shower/Tub Walk-in shower   Bathroom Toilet Raised   Bathroom Equipment Grab bars in shower;Shower chair   Home Equipment Walker;Cane  (uses rollator at baseline)   Prior Function   Level of [place] Independent with ADLs;Independent with functional mobility;Independent with IADLS   Lives With Spouse;Daughter  (Pt assists spouse (dx parkinson's) and daughter (mentally challenged))   Receives Help From Family   IADLs Independent with driving;Independent with meal prep;Independent with medication management   Falls in the last 6 months 0   Vocational Retired   General   Family/Caregiver Present No   Cognition   Overall Cognitive Status WFL   Arousal/Participation Alert   Attention Within functional limits   Orientation Level Oriented X4   Memory Within functional limits   Following Commands Follows all commands and directions without difficulty   RUE Assessment   RUE Assessment WFL   LUE Assessment   LUE Assessment WFL   RLE Assessment   RLE Assessment WFL   LLE Assessment   LLE Assessment WFL   Bed Mobility   Supine to Sit 5  Supervision   Additional items HOB elevated;Bedrails;Increased time required   Additional Comments pt reported dizziness with transitional movement   Transfers   Sit to Stand 4  Minimal assistance   Additional items Assist x 1;Increased time required;Verbal cues   Stand to Sit 4  Minimal assistance   Additional items Assist x 1;Increased time required;Verbal cues   Stand pivot 4  Minimal assistance   Additional items Assist x 1;Verbal cues;Increased time required  (pt slides foot; uses RW)   Additional Comments pt required cues for proper hand placement  (pt able to maintain WBS)   Balance   Static Sitting Fair +   Dynamic Sitting Fair   Static Standing Poor +   Dynamic Standing Poor +   Ambulatory Poor +   Endurance Deficit   Endurance Deficit Yes   Endurance Deficit Description pt reported fatigue with activity   Activity Tolerance   Activity  Tolerance Patient tolerated treatment well   Assessment   Prognosis Good   Assessment Pt is 78 y.o. female seen for PT evaluation s/p admit [facility] w/ Diabetic foot ulcer with osteomyelitis (HCC). PT consulted to assess pt's functional mobility and d/c needs. Order placed for PT eval and tx, w/ NWB RLE order. Pt agreeable to PT  session upon arrival, pt found supine in bed.  PTA, pt was independent w/ all functional mobility w/ rollator, ambulates community distances and elevations, has 10 PATO, lives w/ family in 1 level home, and retired.  Pt to benefit from continued PT tx to address deficits and maximize level of functional independent mobility and consistency. Upon conclusion pt  seated in recliner. Complexity: Comorbidities affecting pt's physical performance at time of assessment include: COPD, DM, anxiety, depression, and skin graft . Personal factors affecting pt at time of IE include: limited mobility, steps to enter home, inability to ambulate household distances, depression, and anxiety. Please find objective findings from PT assessment regarding body systems outlined above with impairments and limitations including impaired balance, decreased endurance, decreased activity tolerance, decreased functional mobility tolerance, fall risk, orthopedic restrictions, and decreased skin integrity.  Pt's clinical presentation is currently unstable/unpredictable seen in pt's presentation of abnormal H&H, recent surgery, polypharmacy, wounds, and multiple readmissions. The patient's AM-PAC Basic Mobility Inpatient Short Form Raw Score is 14.  Based on patient presentations and impairments, pt would most appropriately benefit from Level 1 resource intensity upon discharge. Please also refer to the recommendation of the Physical Therapist for safe discharge planning. RN verbalized pt appropriate for PT session. Pt seen as a co-eval with OT due to the patient's co-morbidities, clinically  unstable presentation, and present impairments which are a regression from the patient's baseline.   Barriers to Discharge Inaccessible home environment;Decreased caregiver support   Goals   Patient Goals to go home   LTG Expiration Date 03/10/24   Long Term Goal #1 Pt will: Perform bed mobility tasks to modified I to improve ease of bed mobility. Perform transfers to modified I to improve ease of transfers. Perform ambulation with MI and RW for 25 feet to increase Indep in home environment. Increase dynamic standing balance to F+ to decrease fall risk. Increase OOB activity tolerance to 10 minutes without s/s of exertion to decrease fall risk. Navigate up and down 10 steps with Min A so patient can enter and exit home.   Plan   Treatment/Interventions Functional transfer training;Therapeutic exercise;Endurance training;Gait training;Bed mobility;Equipment eval/education   PT Frequency 3-5x/wk   Discharge Recommendation   Rehab Resource Intensity Level, PT I (Maximum Resource Intensity)   Equipment Recommended Wheelchair;Walker   AM-PAC Basic Mobility Inpatient   Turning in Flat Bed Without Bedrails 3   Lying on Back to Sitting on Edge of Flat Bed Without Bedrails 3   Moving Bed to Chair 3   Standing Up From Chair Using Arms 3   Walk in Room 1   Climb 3-5 Stairs With Railing 1   Basic Mobility Inpatient Raw Score 14   Basic Mobility Standardized Score 35.55   Highest Level Of Mobility   JH-HLM Goal 4: Move to chair/commode   JH-HLM Achieved 4: Move to chair/commode     Time In: 0920  Time Out: 0937  Total Evaluation Minutes: 17    Thalia Cassidy, PT

## 2024-03-01 LAB
ANION GAP SERPL CALCULATED.3IONS-SCNC: 9 MMOL/L
BUN SERPL-MCNC: 21 MG/DL (ref 5–25)
CALCIUM SERPL-MCNC: 9.8 MG/DL (ref 8.4–10.2)
CHLORIDE SERPL-SCNC: 101 MMOL/L (ref 96–108)
CO2 SERPL-SCNC: 30 MMOL/L (ref 21–32)
CREAT SERPL-MCNC: 0.49 MG/DL (ref 0.6–1.3)
ERYTHROCYTE [DISTWIDTH] IN BLOOD BY AUTOMATED COUNT: 15.3 % (ref 11.6–15.1)
GFR SERPL CREATININE-BSD FRML MDRD: 94 ML/MIN/1.73SQ M
GLUCOSE SERPL-MCNC: 119 MG/DL (ref 65–140)
GLUCOSE SERPL-MCNC: 129 MG/DL (ref 65–140)
GLUCOSE SERPL-MCNC: 145 MG/DL (ref 65–140)
GLUCOSE SERPL-MCNC: 148 MG/DL (ref 65–140)
GLUCOSE SERPL-MCNC: 185 MG/DL (ref 65–140)
HCT VFR BLD AUTO: 37.1 % (ref 34.8–46.1)
HGB BLD-MCNC: 11.5 G/DL (ref 11.5–15.4)
MAGNESIUM SERPL-MCNC: 1.9 MG/DL (ref 1.9–2.7)
MCH RBC QN AUTO: 30.7 PG (ref 26.8–34.3)
MCHC RBC AUTO-ENTMCNC: 31 G/DL (ref 31.4–37.4)
MCV RBC AUTO: 99 FL (ref 82–98)
PLATELET # BLD AUTO: 299 THOUSANDS/UL (ref 149–390)
PMV BLD AUTO: 10.2 FL (ref 8.9–12.7)
POTASSIUM SERPL-SCNC: 4.1 MMOL/L (ref 3.5–5.3)
RBC # BLD AUTO: 3.74 MILLION/UL (ref 3.81–5.12)
SODIUM SERPL-SCNC: 140 MMOL/L (ref 135–147)
WBC # BLD AUTO: 9.46 THOUSAND/UL (ref 4.31–10.16)

## 2024-03-01 PROCEDURE — 97530 THERAPEUTIC ACTIVITIES: CPT

## 2024-03-01 PROCEDURE — 99232 SBSQ HOSP IP/OBS MODERATE 35: CPT | Performed by: PHYSICIAN ASSISTANT

## 2024-03-01 PROCEDURE — 97110 THERAPEUTIC EXERCISES: CPT

## 2024-03-01 PROCEDURE — 80048 BASIC METABOLIC PNL TOTAL CA: CPT | Performed by: PHYSICIAN ASSISTANT

## 2024-03-01 PROCEDURE — 82948 REAGENT STRIP/BLOOD GLUCOSE: CPT

## 2024-03-01 PROCEDURE — 85027 COMPLETE CBC AUTOMATED: CPT | Performed by: PHYSICIAN ASSISTANT

## 2024-03-01 PROCEDURE — 83735 ASSAY OF MAGNESIUM: CPT | Performed by: PHYSICIAN ASSISTANT

## 2024-03-01 RX ADMIN — POLYETHYLENE GLYCOL 3350 17 G: 17 POWDER, FOR SOLUTION ORAL at 09:50

## 2024-03-01 RX ADMIN — METHOCARBAMOL 250 MG: 500 TABLET ORAL at 13:31

## 2024-03-01 RX ADMIN — CYANOCOBALAMIN TAB 500 MCG 1000 MCG: 500 TAB at 09:51

## 2024-03-01 RX ADMIN — RIVAROXABAN 2.5 MG: 2.5 TABLET, FILM COATED ORAL at 17:22

## 2024-03-01 RX ADMIN — ACETAMINOPHEN 975 MG: 325 TABLET, FILM COATED ORAL at 13:31

## 2024-03-01 RX ADMIN — OMEGA-3 FATTY ACIDS CAP 1000 MG 1000 MG: 1000 CAP at 09:52

## 2024-03-01 RX ADMIN — PREGABALIN 200 MG: 100 CAPSULE ORAL at 21:48

## 2024-03-01 RX ADMIN — INSULIN LISPRO 1 UNITS: 100 INJECTION, SOLUTION INTRAVENOUS; SUBCUTANEOUS at 21:49

## 2024-03-01 RX ADMIN — METHOCARBAMOL 250 MG: 500 TABLET ORAL at 05:50

## 2024-03-01 RX ADMIN — Medication 1 TABLET: at 09:50

## 2024-03-01 RX ADMIN — ASPIRIN 81 MG CHEWABLE TABLET 81 MG: 81 TABLET CHEWABLE at 09:50

## 2024-03-01 RX ADMIN — PREGABALIN 200 MG: 100 CAPSULE ORAL at 09:51

## 2024-03-01 RX ADMIN — METHOCARBAMOL 250 MG: 500 TABLET ORAL at 21:48

## 2024-03-01 RX ADMIN — FLUTICASONE FUROATE AND VILANTEROL TRIFENATATE 1 PUFF: 200; 25 POWDER RESPIRATORY (INHALATION) at 09:51

## 2024-03-01 RX ADMIN — RIVAROXABAN 2.5 MG: 2.5 TABLET, FILM COATED ORAL at 09:59

## 2024-03-01 RX ADMIN — BUPROPION HYDROCHLORIDE 300 MG: 150 TABLET, FILM COATED, EXTENDED RELEASE ORAL at 09:50

## 2024-03-01 RX ADMIN — CLONAZEPAM 1 MG: 1 TABLET ORAL at 17:20

## 2024-03-01 RX ADMIN — ACETAMINOPHEN 975 MG: 325 TABLET, FILM COATED ORAL at 05:56

## 2024-03-01 RX ADMIN — ACETAMINOPHEN 975 MG: 325 TABLET, FILM COATED ORAL at 21:48

## 2024-03-01 RX ADMIN — CEFPODOXIME PROXETIL 400 MG: 200 TABLET, FILM COATED ORAL at 16:54

## 2024-03-01 RX ADMIN — CEFPODOXIME PROXETIL 400 MG: 200 TABLET, FILM COATED ORAL at 09:51

## 2024-03-01 RX ADMIN — PANTOPRAZOLE SODIUM 40 MG: 40 TABLET, DELAYED RELEASE ORAL at 05:50

## 2024-03-01 RX ADMIN — SENNOSIDES, DOCUSATE SODIUM 1 TABLET: 8.6; 5 TABLET ORAL at 17:20

## 2024-03-01 RX ADMIN — SENNOSIDES, DOCUSATE SODIUM 1 TABLET: 8.6; 5 TABLET ORAL at 09:50

## 2024-03-01 RX ADMIN — PREGABALIN 200 MG: 100 CAPSULE ORAL at 17:20

## 2024-03-01 RX ADMIN — NICOTINE 1 PATCH: 14 PATCH, EXTENDED RELEASE TRANSDERMAL at 09:55

## 2024-03-01 NOTE — PLAN OF CARE
Problem: PAIN - ADULT  Goal: Verbalizes/displays adequate comfort level or baseline comfort level  Description: Interventions:  - Encourage patient to monitor pain and request assistance  - Assess pain using appropriate pain scale  - Administer analgesics based on type and severity of pain and evaluate response  - Implement non-pharmacological measures as appropriate and evaluate response  - Consider cultural and social influences on pain and pain management  - Notify physician/advanced practitioner if interventions unsuccessful or patient reports new pain  3/1/2024 0826 by Kailey Jeffery RN  Outcome: Progressing  3/1/2024 0824 by Kailey Jeffery RN  Outcome: Progressing  3/1/2024 0824 by Kailey Jeffery RN  Outcome: Progressing

## 2024-03-01 NOTE — ASSESSMENT & PLAN NOTE
Right posterior ankle ulcer with exposed Achilles tendon s/p wound debridement on 2/4/24  Osteomyelitis of right second toe s/p amputation on 2/4/24  Transferred from [facility] for STSG. S/p STSG application 2/28/24 by Podiatry  Wound care per Podiatry. Per Podiatry, no future surgical plans

## 2024-03-01 NOTE — QUICK NOTE
Podiatry Quick Note    Podiatry was informed by primary team of strikethrough to RLE donor site dressing and patient's concern over the strikethrough. Informed patient that post operatively it is expected that the donor site will ooze serosanguinous and sanguinous fluid for likely a week or more. Nursing instructions are in place for patient to receive donor site dressing changes from nursing team. Donor site was changed by podiatry today, it is stable with no acute clinical signs of infection. Plan remains for first post operative dressing change with podiatry on Monday, 3/4. Patient is stable from podiatry standpoint to be transferred to [facility], no future surgical plans.

## 2024-03-01 NOTE — ASSESSMENT & PLAN NOTE
Lab Results   Component Value Date    HGBA1C 6.0 (H) 02/28/2024       Recent Labs     02/29/24  1153 02/29/24  1559 02/29/24  2102 03/01/24  0755   POCGLU 150* 101 156* 119         Blood Sugar Average: Last 72 hrs:  (P) [id number]  Continue SSI

## 2024-03-01 NOTE — PLAN OF CARE
Problem: OCCUPATIONAL THERAPY ADULT  Goal: Performs self-care activities at highest level of function for planned discharge setting.  See evaluation for individualized goals.  Description: Treatment Interventions: ADL retraining, Functional transfer training, Cognitive reorientation, Endurance training, Patient/family training, Equipment evaluation/education, Compensatory technique education, Continued evaluation, Energy conservation, Activityengagement          See flowsheet documentation for full assessment, interventions and recommendations.   Outcome: Progressing  Note: Limitation: Decreased ADL status, Decreased endurance, Decreased self-care trans, Decreased high-level ADLs  Prognosis: Good  Assessment: Pt was seen on 3/1/2024 to address ADL retraining, functional transfer training, and activity tolerance/endurance. Pt NWB R LE.Pt demonstrating improvements and currently requires MIN A for functional transfers with rw, pt requires verbal cues to maintain WBS. Pt completed 1 set of 15 shoulder rasises, 1 set of 15 lateral raises, 1 set of 15 bicep curls with a towel for strengthening of UEs for functional activity. Pt is limited by decreased ADL status, functional transfers, functional mobility, and activity tolerance. Pt seated in bedside chair at beginning of session and seated in bedside chair at end of session with alarm set and all items within reach. The patient's raw score on the AM-PAC Daily Activity Inpatient Short Form is 17. A raw score of less than 19 suggests the patient may benefit from discharge to post-acute rehabilitation services. Please refer to the recommendation of the Occupational Therapist for safe discharge planning. Recommend level I maximum intensity OT services at d/c to maximize pt function.     Rehab Resource Intensity Level, OT: I (Maximum Resource Intensity)

## 2024-03-01 NOTE — OCCUPATIONAL THERAPY NOTE
Occupational Therapy Progress Note     Patient Name: Lona Cedeno  Today's Date: 3/1/2024  Problem List  Principal Problem:    Diabetic foot ulcer with osteomyelitis (HCC)  Active Problems:    Type 2 diabetes mellitus with diabetic polyneuropathy (HCC)    Anxiety and depression    GERD without esophagitis    Hypomagnesemia    PAD (peripheral artery disease) (HCC)    COPD, severity to be determined (HCC)    Tobacco use disorder    Surgical site infection    Sacral wound          03/01/24 1150   OT Last Visit   OT Visit Date 03/01/24   Note Type   Note Type Treatment   Pain Assessment   Pain Assessment Tool 0-10   Pain Score No Pain   Restrictions/Precautions   Weight Bearing Precautions Per Order Yes   RLE Weight Bearing Per Order NWB   Other Precautions Contact/isolation;WBS;Pain   Transfers   Sit to Stand 4  Minimal assistance   Additional items Assist x 1;Increased time required;Verbal cues   Stand to Sit 4  Minimal assistance   Additional items Assist x 1;Increased time required;Verbal cues   Additional Comments with rw, pt requires vc to maintain WBS   Therapeutic Excerise-Strength   UE Strength Yes   Right Upper Extremity- Strength   R Shoulder Flexion;Extension   R Elbow Elbow flexion;Elbow extension   RUE Strength Comment Pt completed 1 set of 15 shoulder rasises, 1 set of 15 lateral raises, 1 set of 15 bicep curls with a towel for strengthening of UEs for functional activity.   Left Upper Extremity-Strength   L Shoulder Flexion;Extension   L Elbow Elbow flexion;Elbow extension   LUE Strength Comment Pt completed 1 set of 15 shoulder rasises, 1 set of 15 lateral raises, 1 set of 15 bicep curls with a towel for strengthening of UEs for functional activity.   Cognition   Overall Cognitive Status WFL   Arousal/Participation Alert;Responsive;Cooperative   Attention Within functional limits   Orientation Level Oriented X4   Memory Within functional limits   Following Commands Follows all commands and directions  without difficulty   Comments Pt agreeable to therapy   Activity Tolerance   Activity Tolerance Patient tolerated treatment well   Medical Staff Made Aware RN aware   Assessment   Assessment Pt was seen on 3/1/2024 to address ADL retraining, functional transfer training, and activity tolerance/endurance. Pt NWB ABBEY GILES.Pt demonstrating improvements and currently requires MIN A for functional transfers with rw, pt requires verbal cues to maintain WBS. Pt completed 1 set of 15 shoulder rasises, 1 set of 15 lateral raises, 1 set of 15 bicep curls with a towel for strengthening of UEs for functional activity. Pt is limited by decreased ADL status, functional transfers, functional mobility, and activity tolerance. Pt seated in bedside chair at beginning of session and seated in bedside chair at end of session with alarm set and all items within reach. The patient's raw score on the AM-PAC Daily Activity Inpatient Short Form is 17. A raw score of less than 19 suggests the patient may benefit from discharge to post-acute rehabilitation services. Please refer to the recommendation of the Occupational Therapist for safe discharge planning. Recommend level I maximum intensity OT services at d/c to maximize pt function.   Plan   Treatment Interventions ADL retraining;Functional transfer training;Cognitive reorientation;Endurance training;Patient/family training;Equipment evaluation/education;Compensatory technique education;Continued evaluation;Energy conservation;Activityengagement   Goal Expiration Date 03/14/24   OT Treatment Day 1   OT Frequency 2-3x/wk   Discharge Recommendation   Rehab Resource Intensity Level, OT I (Maximum Resource Intensity)   AM-PAC Daily Activity Inpatient   Lower Body Dressing 2   Bathing 2   Toileting 2   Upper Body Dressing 3   Grooming 4   Eating 4   Daily Activity Raw Score 17   Daily Activity Standardized Score (Calc for Raw Score >=11) 37.26   AM-PAC Applied Cognition Inpatient   Following a  Speech/Presentation 4   Understanding Ordinary Conversation 4   Taking Medications 4   Remembering Where Things Are Placed or Put Away 4   Remembering List of 4-5 Errands 4   Taking Care of Complicated Tasks 4   Applied Cognition Raw Score 24   Applied Cognition Standardized Score 62.21   End of Consult   Education Provided Yes   Patient Position at End of Consult Bedside chair;Bed/Chair alarm activated;All needs within reach   Nurse Communication Nurse aware of consult     MARTIN Morley, OTR/L

## 2024-03-01 NOTE — PHYSICAL THERAPY NOTE
PT Treatment Note    NAME:  Lona Cedeno  DATE: 03/01/24    AGE:   78 y.o.  Mrn:   [id number]  ADMIT DX:  Surgical site infection [T81.49XA]  Performed at least 2 patient identifiers during session: Name and ID bracelet         03/01/24 1101   PT Last Visit   PT Visit Date 03/01/24   Note Type   Note Type Treatment   Pain Assessment   Pain Assessment Tool 0-10   Pain Score 2   Pain Location/Orientation Orientation: Right;Location: Foot   Hospital Pain Intervention(s) Repositioned;Elevated   Restrictions/Precautions   Weight Bearing Precautions Per Order Yes   RLE Weight Bearing Per Order NWB   Other Precautions Contact/isolation;WBS;Pain   General   Chart Reviewed Yes   Family/Caregiver Present No   Cognition   Overall Cognitive Status WFL   Arousal/Participation Alert   Attention Within functional limits   Orientation Level Oriented X4   Memory Within functional limits   Following Commands Follows all commands and directions without difficulty   Bed Mobility   Supine to Sit 5  Supervision   Additional items HOB elevated   Transfers   Sit to Stand 4  Minimal assistance   Additional items Assist x 1;Verbal cues;Increased time required   Stand to Sit 4  Minimal assistance   Additional items Assist x 1;Verbal cues;Increased time required;Armrests   Stand pivot 4  Minimal assistance   Additional items Assist x 1;Increased time required;Verbal cues  (with RW; slide on LLE)   Additional Comments pt denied dizziness with transitional movement   Balance   Static Sitting Good   Dynamic Sitting Fair +   Static Standing Poor +   Dynamic Standing Poor +   Endurance Deficit   Endurance Deficit Yes   Endurance Deficit Description   (fatigue)   Activity Tolerance   Activity Tolerance Patient limited by fatigue;Patient limited by pain   Exercises   Glute Sets Sitting;15 reps;AROM;Bilateral   Hip Flexion Sitting;15 reps;AROM;Bilateral   Hip Adduction Sitting;15 reps;AROM;Bilateral   Knee Extension Stretch Sitting;15  reps;Left;AROM   Knee AROM Long Arc Quad Sitting;15 reps;AROM;Bilateral   Assessment   Prognosis Good   Assessment Pt seen for PT treatment session this date with interventions consisting of therapeutic exercise to improve endurance to improve functional mobility and therapeutic activity to improve transfers and increase activity tolerance with functional mobility to decrease fall risk. Pt agreeable to PT treatment session upon arrival, pt found supine in bed, in no apparent distress. Since previous session, pt has made good progress as evidenced by improved balance  Barriers during this session include pain and fatigue.  Pt continues to be functioning below baseline level, and remains limited 2* factors listed above and including impaired activity tolerance, impaired  balance, pain, decreased endurance, decreased mobility, and orthopedic restrictions.  Pt prognosis for achieving goals is good, pending pt progress with hospitalization/medical status improvements, and indicated by motivated to participate in therapy, ability to follow cues, and improvement with mobility status. PT will continue to see pt during current hospitalization in order to address the deficits listed above and provide interventions consistent w/ POC in effort to achieve goals. Current goals and POC remain appropriate, pt continues to have rehab potential  Upon conclusion pt seated OOB in recliner. The patient's AM-PAC Basic Mobility Inpatient Short Form Raw Score is 14.  Based on patient presentations and impairments, pt would most appropriately benefit from Level 1 resource intensity upon discharge.  Please also refer to the recommendation of the Physical Therapist for safe discharge planning. RN verbalized pt appropriate for PT session.   Barriers to Discharge Inaccessible home environment;Decreased caregiver support   Goals   Patient Goals to go home   LTG Expiration Date 03/10/24   PT Treatment Day 3   Plan   Treatment/Interventions  Functional transfer training;Therapeutic exercise;Endurance training;Bed mobility;Equipment eval/education   Progress Progressing toward goals   PT Frequency 3-5x/wk   Discharge Recommendation   Rehab Resource Intensity Level, PT I (Maximum Resource Intensity)   Equipment Recommended Wheelchair;Walker   AM-PAC Basic Mobility Inpatient   Turning in Flat Bed Without Bedrails 3   Lying on Back to Sitting on Edge of Flat Bed Without Bedrails 3   Moving Bed to Chair 3   Standing Up From Chair Using Arms 3   Walk in Room 1   Climb 3-5 Stairs With Railing 1   Basic Mobility Inpatient Raw Score 14   Basic Mobility Standardized Score 35.55   Highest Level Of Mobility   JH-HLM Goal 4: Move to chair/commode   JH-HLM Achieved 4: Move to chair/commode       Time In: 1101  Time Out: 1124  Total Treatment Minutes: 23    Thalia Cassidy, PT

## 2024-03-01 NOTE — PLAN OF CARE
Problem: PHYSICAL THERAPY ADULT  Goal: Performs mobility at highest level of function for planned discharge setting.  See evaluation for individualized goals.  Description: Treatment/Interventions: Functional transfer training, Therapeutic exercise, Endurance training, Gait training, Bed mobility, Equipment eval/education  Equipment Recommended: Wheelchair, Walker       See flowsheet documentation for full assessment, interventions and recommendations.  Outcome: Progressing  Note: Prognosis: Good     Assessment: Pt seen for PT treatment session this date with interventions consisting of therapeutic exercise to improve endurance to improve functional mobility and therapeutic activity to improve transfers and increase activity tolerance with functional mobility to decrease fall risk. Pt agreeable to PT treatment session upon arrival, pt found supine in bed, in no apparent distress. Since previous session, pt has made good progress as evidenced by improved balance  Barriers during this session include pain and fatigue.  Pt continues to be functioning below baseline level, and remains limited 2* factors listed above and including impaired activity tolerance, impaired  balance, pain, decreased endurance, decreased mobility, and orthopedic restrictions.  Pt prognosis for achieving goals is good, pending pt progress with hospitalization/medical status improvements, and indicated by motivated to participate in therapy, ability to follow cues, and improvement with mobility status. PT will continue to see pt during current hospitalization in order to address the deficits listed above and provide interventions consistent w/ POC in effort to achieve goals. Current goals and POC remain appropriate, pt continues to have rehab potential  Upon conclusion pt seated OOB in recliner. The patient's AM-PAC Basic Mobility Inpatient Short Form Raw Score is 14.  Based on patient presentations and impairments, pt would most appropriately  benefit from Level 1 resource intensity upon discharge.  Please also refer to the recommendation of the Physical Therapist for safe discharge planning. RN verbalized pt appropriate for PT session.  Barriers to Discharge: Inaccessible home environment, Decreased caregiver support     Rehab Resource Intensity Level, PT: I (Maximum Resource Intensity)    See flowsheet documentation for full assessment.

## 2024-03-01 NOTE — PHYSICAL THERAPY NOTE
PT DISCHARGE SUMMARY     PATIENT UNABLE TO ACHIEVE GOALS DURING HER STAY AT THE ACUTE REHAB CENTER AS SHE REQUIRED A TRANSFER BACK TO ACUTE CARE FOR INCREASED MEDICAL ATTENTION. THERE, PATIENT WILL BENEFIT FROM CONTINUED SKILLED PT INTERVENTION TO MAXIMIZE HER FUNCTIONAL MOBILITY (I) AND SAFETY.     Shaneka Wilcox PT, DPT

## 2024-03-01 NOTE — PROGRESS NOTES
[facility]  Progress Note  Name: Lona Cedeno I  MRN: [id number]  Unit/Bed#: NW8 876-01 I Date of Admission: 2/27/2024   Date of Service: 3/1/2024 I Hospital Day: 3    Assessment/Plan   * Diabetic foot ulcer with osteomyelitis (HCC)  Assessment & Plan  Right posterior ankle ulcer with exposed Achilles tendon s/p wound debridement on 2/4/24  Osteomyelitis of right second toe s/p amputation on 2/4/24  Transferred from [facility] for STSG. S/p STSG application 2/28/24 by Podiatry  Wound care per Podiatry. Per Podiatry, no future surgical plans       Surgical site infection  Assessment & Plan  Hospitalized from 2/13/24 to 2/23/24 for right groin surgical wound infection from previous RLE bypass  S/p debridement on 2/14/2024 and washout on 2/19/2024  Wound cultures - Klebsiella and Proteus  S/p removal of vac by vascular surgery on 2/28, they have since signed off   ID recommended continuation of antibiotics through 3/22/2024 due to proximity of wound infection to the bypass graft - on Cefpodoxime 400 mg twice daily    Sacral wound  Assessment & Plan  Noted to have unstageable wound on the sacrum on initial admission  Continue wound care    Type 2 diabetes mellitus with diabetic polyneuropathy (HCC)  Assessment & Plan  Lab Results   Component Value Date    HGBA1C 6.0 (H) 02/28/2024       Recent Labs     02/29/24  1153 02/29/24  1559 02/29/24  2102 03/01/24  0755   POCGLU 150* 101 156* 119         Blood Sugar Average: Last 72 hrs:  (P) [id number]  Continue SSI    PAD (peripheral artery disease) ([facility])  Assessment & Plan  Continue ASA, statin, Xarelto  Vascular managing right groin wound. Right groing wound vac removed as above, and recs for wound care     COPD, severity to be determined ([facility])  Assessment & Plan  No exacerbation  Home Advair replaced by equivalent formulary Breo  Continue albuterol as needed    Anxiety and depression  Assessment & Plan  Continue Wellbutrin   mg daily, Clonazepam 1 mg daily    GERD without esophagitis  Assessment & Plan  Continue PPI    Hypomagnesemia  Assessment & Plan  Resolved with repletion     Tobacco use disorder  Assessment & Plan  Continue nicotine patch  Encourage Smoking cessation              VTE Pharmacologic Prophylaxis: VTE Score: 4 Moderate Risk (Score 3-4) - Pharmacological DVT Prophylaxis Ordered: rivaroxaban (Xarelto).    Mobility:   Basic Mobility Inpatient Raw Score: 14  JH-HLM Goal: 4: Move to chair/commode  JH-HLM Achieved: 4: Move to chair/commode  HLM Goal achieved. Continue to encourage appropriate mobility.     Patient Centered Rounds: I performed bedside rounds with nursing staff today.  Kailey  Discussions with Specialists or Other Care Team Provider: podiatry resident     Education and Discussions with Family / Patient: Patient declined call to .     Total Time Spent on Date of Encounter in care of patient: 35 mins. This time was spent on one or more of the following: performing physical exam; counseling and coordination of care; obtaining or reviewing history; documenting in the medical record; reviewing/ordering tests, medications or procedures; communicating with other healthcare professionals and discussing with patient's family/caregivers.    Current Length of Stay: 3 day(s)  Current Patient Status: Inpatient   Certification Statement: The patient will continue to require additional inpatient hospital stay due to safe d/c planning   Discharge Plan: Anticipate discharge later today or tomorrow to [facility]    Code Status: Level 1 - Full Code    Subjective:   Ms. Cedeno denies pain. She wants to speak with podiatry about her VAC - made them aware. Denies dizziness, lightheadedness, CP, SOB, abdominal pain, fever    Objective:     Vitals:   Temp (24hrs), Av.2 °F (36.8 °C), Min:98 °F (36.7 °C), Max:98.4 °F (36.9 °C)    Temp:  [98 °F (36.7 °C)-98.4 °F (36.9 °C)] 98 °F (36.7 °C)  HR:  [72-73] 73  Resp:  [18] 18  BP:  (106-123)/(53-57) 123/53  SpO2:  [94 %-95 %] 94 %  Body mass index is 28.91 kg/m².     Input and Output Summary (last 24 hours):     Intake/Output Summary (Last 24 hours) at 3/1/2024 0914  Last data filed at 3/1/2024 0601  Gross per 24 hour   Intake --   Output 1950 ml   Net -1950 ml       Physical Exam:   Physical Exam  Vitals reviewed.   Constitutional:       Comments: Patient seen sitting in bed, NAD, eating breakfast comfortably    Cardiovascular:      Rate and Rhythm: Normal rate and regular rhythm.   Pulmonary:      Effort: Pulmonary effort is normal. No respiratory distress.      Breath sounds: Normal breath sounds.   Abdominal:      General: Bowel sounds are normal.      Palpations: Abdomen is soft.      Tenderness: There is no abdominal tenderness.   Musculoskeletal:      Comments: Right foot boot, donor site dressing with strikethrough - sent picture to podiatry with patient's consent   Skin:     General: Skin is warm.   Neurological:      Mental Status: She is alert and oriented to person, place, and time.   Psychiatric:         Mood and Affect: Mood normal.         Behavior: Behavior normal.        Additional Data:     Labs:  Results from last 7 days   Lab Units 03/01/24  0550 02/29/24  0538   WBC Thousand/uL 9.46 7.60   HEMOGLOBIN g/dL 11.5 11.1*   HEMATOCRIT % 37.1 36.3   PLATELETS Thousands/uL 299 299   NEUTROS PCT %  --  53   LYMPHS PCT %  --  25   MONOS PCT %  --  8   EOS PCT %  --  13*     Results from last 7 days   Lab Units 03/01/24  0550 02/29/24  0538 02/28/24  0340   SODIUM mmol/L 140   < > 140   POTASSIUM mmol/L 4.1   < > 4.1   CHLORIDE mmol/L 101   < > 101   CO2 mmol/L 30   < > 31   BUN mg/dL 21   < > 17   CREATININE mg/dL 0.49*   < > 0.61   ANION GAP mmol/L 9   < > 8   CALCIUM mg/dL 9.8   < > 10.1   ALBUMIN g/dL  --   --  3.7   TOTAL BILIRUBIN mg/dL  --   --  0.27   ALK PHOS U/L  --   --  54   ALT U/L  --   --  4*   AST U/L  --   --  14   GLUCOSE RANDOM mg/dL 129   < > 128    < > = values  in this interval not displayed.         Results from last 7 days   Lab Units 03/01/24  0755 02/29/24  2102 02/29/24  1559 02/29/24  1153 02/29/24  0807 02/28/24  2047 02/28/24  1600 02/28/24  1519 02/28/24  1129 02/28/24  0735 02/27/24  2055 02/27/24  1037   POC GLUCOSE mg/dl 119 156* 101 150* 110 171* 104 108 125 147* 185* 113     Results from last 7 days   Lab Units 02/28/24  0438   HEMOGLOBIN A1C % 6.0*           Lines/Drains:  Invasive Devices       Peripheral Intravenous Line  Duration             Peripheral IV 02/27/24 Left;Ventral (anterior) Forearm 2 days              Drain  Duration             Ureteral Internal Stent Left ureter 6 Fr. 102 days                          Imaging: No pertinent imaging reviewed.    Recent Cultures (last 7 days):         Last 24 Hours Medication List:   Current Facility-Administered Medications   Medication Dose Route Frequency Provider Last Rate    acetaminophen  975 mg Oral Q8H [facility] Mary Whyte DPM      albuterol  2 puff Inhalation Q6H PRN Mary Whyte DPM      aspirin  81 mg Oral Daily Mary Whyte DPM      buPROPion  300 mg Oral Daily Mary Whyte DPM      cefpodoxime  400 mg Oral BID With Meals Mary Whyte DPM      clonazePAM  1 mg Oral QPM Mary Whyte DPM      cyanocobalamin  1,000 mcg Oral Daily Mary Whyte DPM      fish oil  1,000 mg Oral Daily Mary Whyte DPM      fluticasone-vilanterol  1 puff Inhalation Daily Mary Whyte DPM      insulin lispro  1-5 Units Subcutaneous TID AC Mary Whyte DPM      insulin lispro  1-5 Units Subcutaneous HS Mary Whyte DPM      methocarbamol  250 mg Oral Q8H JILLIAN Mary Whyte DPM      multivitamin-minerals  1 tablet Oral Daily Mary Whyte DPM      nicotine  1 patch Transdermal Daily Mary Whyte DPM      oxyCODONE  2.5 mg Oral Q4H PRN Mary Whyte DPM      Or    oxyCODONE  5 mg Oral  Q4H PRN Mary Whyte DPM      pantoprazole  40 mg Oral Early Morning Mary Whyte DPM      polyethylene glycol  17 g Oral Daily Mary Whyte DPM      pregabalin  200 mg Oral TID Mary Whyte DPM      rivaroxaban  2.5 mg Oral BID Kavya Lang MD      senna-docusate sodium  1 tablet Oral BID Mary Whyte DPM          Today, Patient Was Seen By: Zafar Geronimo PA-C    **Please Note: This note may have been constructed using a voice recognition system.**

## 2024-03-01 NOTE — PLAN OF CARE
Problem: PAIN - ADULT  Goal: Verbalizes/displays adequate comfort level or baseline comfort level  Description: Interventions:  - Encourage patient to monitor pain and request assistance  - Assess pain using appropriate pain scale  - Administer analgesics based on type and severity of pain and evaluate response  - Implement non-pharmacological measures as appropriate and evaluate response  - Consider cultural and social influences on pain and pain management  - Notify physician/advanced practitioner if interventions unsuccessful or patient reports new pain  3/1/2024 0824 by Kailey Jeffery RN  Outcome: Progressing  3/1/2024 0824 by Kailey Jeffery RN  Outcome: Progressing

## 2024-03-02 LAB
GLUCOSE SERPL-MCNC: 100 MG/DL (ref 65–140)
GLUCOSE SERPL-MCNC: 132 MG/DL (ref 65–140)
GLUCOSE SERPL-MCNC: 154 MG/DL (ref 65–140)
GLUCOSE SERPL-MCNC: 237 MG/DL (ref 65–140)

## 2024-03-02 PROCEDURE — 82948 REAGENT STRIP/BLOOD GLUCOSE: CPT

## 2024-03-02 PROCEDURE — 99233 SBSQ HOSP IP/OBS HIGH 50: CPT | Performed by: PHYSICIAN ASSISTANT

## 2024-03-02 RX ADMIN — FLUTICASONE FUROATE AND VILANTEROL TRIFENATATE 1 PUFF: 200; 25 POWDER RESPIRATORY (INHALATION) at 09:26

## 2024-03-02 RX ADMIN — RIVAROXABAN 2.5 MG: 2.5 TABLET, FILM COATED ORAL at 17:07

## 2024-03-02 RX ADMIN — INSULIN LISPRO 2 UNITS: 100 INJECTION, SOLUTION INTRAVENOUS; SUBCUTANEOUS at 22:43

## 2024-03-02 RX ADMIN — CYANOCOBALAMIN TAB 500 MCG 1000 MCG: 500 TAB at 09:27

## 2024-03-02 RX ADMIN — PREGABALIN 200 MG: 100 CAPSULE ORAL at 22:40

## 2024-03-02 RX ADMIN — ACETAMINOPHEN 975 MG: 325 TABLET, FILM COATED ORAL at 05:34

## 2024-03-02 RX ADMIN — CEFPODOXIME PROXETIL 400 MG: 200 TABLET, FILM COATED ORAL at 17:06

## 2024-03-02 RX ADMIN — RIVAROXABAN 2.5 MG: 2.5 TABLET, FILM COATED ORAL at 12:11

## 2024-03-02 RX ADMIN — PREGABALIN 200 MG: 100 CAPSULE ORAL at 15:21

## 2024-03-02 RX ADMIN — PREGABALIN 200 MG: 100 CAPSULE ORAL at 09:27

## 2024-03-02 RX ADMIN — INSULIN LISPRO 1 UNITS: 100 INJECTION, SOLUTION INTRAVENOUS; SUBCUTANEOUS at 12:11

## 2024-03-02 RX ADMIN — NICOTINE 1 PATCH: 14 PATCH, EXTENDED RELEASE TRANSDERMAL at 09:26

## 2024-03-02 RX ADMIN — METHOCARBAMOL 250 MG: 500 TABLET ORAL at 22:41

## 2024-03-02 RX ADMIN — ASPIRIN 81 MG CHEWABLE TABLET 81 MG: 81 TABLET CHEWABLE at 09:27

## 2024-03-02 RX ADMIN — ACETAMINOPHEN 975 MG: 325 TABLET, FILM COATED ORAL at 15:21

## 2024-03-02 RX ADMIN — PANTOPRAZOLE SODIUM 40 MG: 40 TABLET, DELAYED RELEASE ORAL at 05:34

## 2024-03-02 RX ADMIN — METHOCARBAMOL 250 MG: 500 TABLET ORAL at 15:21

## 2024-03-02 RX ADMIN — BUPROPION HYDROCHLORIDE 300 MG: 150 TABLET, FILM COATED, EXTENDED RELEASE ORAL at 09:27

## 2024-03-02 RX ADMIN — METHOCARBAMOL 250 MG: 500 TABLET ORAL at 05:34

## 2024-03-02 RX ADMIN — CEFPODOXIME PROXETIL 400 MG: 200 TABLET, FILM COATED ORAL at 09:27

## 2024-03-02 RX ADMIN — SENNOSIDES, DOCUSATE SODIUM 1 TABLET: 8.6; 5 TABLET ORAL at 17:07

## 2024-03-02 RX ADMIN — SENNOSIDES, DOCUSATE SODIUM 1 TABLET: 8.6; 5 TABLET ORAL at 09:27

## 2024-03-02 RX ADMIN — CLONAZEPAM 1 MG: 1 TABLET ORAL at 17:06

## 2024-03-02 RX ADMIN — ACETAMINOPHEN 975 MG: 325 TABLET, FILM COATED ORAL at 22:42

## 2024-03-02 RX ADMIN — Medication 1 TABLET: at 09:27

## 2024-03-02 RX ADMIN — POLYETHYLENE GLYCOL 3350 17 G: 17 POWDER, FOR SOLUTION ORAL at 09:27

## 2024-03-02 RX ADMIN — OMEGA-3 FATTY ACIDS CAP 1000 MG 1000 MG: 1000 CAP at 09:27

## 2024-03-02 NOTE — PROGRESS NOTES
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 First dressing change to be done by podiatry Monday. Podiatry note states they can change dressing at [facility] and she is stable for discharge but she needs insurance auth so won't be discharged before monday    Tobacco use disorder  Assessment & Plan  Continue nicotine patch  Encourage Smoking cessation     COPD, severity to be determined ([facility])  Assessment & Plan  No exacerbation  Home Advair replaced by equivalent formulary Breo  Continue albuterol as needed    PAD (peripheral artery disease) ([facility])  Assessment & Plan  Continue ASA, statin, Xarelto  Vascular managing right groin wound. Right groin wound vac removed as above, and recs for wound care     Hypomagnesemia  Assessment & Plan  Resolved with repletion   Recheck labs tomorrow in anticipation for discharge to rehab early next week    GERD without esophagitis  Assessment & Plan  Continue PPI    Anxiety and depression  Assessment & Plan  Continue Wellbutrin  mg daily, Clonazepam 1 mg daily    Type 2 diabetes mellitus with diabetic polyneuropathy ([facility])  Assessment & Plan  Lab Results   Component Value Date     HGBA1C 6.0 (H) 2024       Recent Labs     24  1604 24  2050 24  0732 24  1121   POCGLU 145* 185* 132 154*         Blood Sugar Average: Last 72 hrs:  (P) 137  Continue SSI    * Diabetic foot ulcer with osteomyelitis (HCC)  Assessment & Plan  Right posterior ankle ulcer with exposed Achilles tendon s/p wound debridement on 24  Osteomyelitis of right second toe s/p amputation on 24  Transferred from [facility] for STSG. S/p STSG application 24 by Podiatry  Operative dressing to be changed by podiatry monday               VTE Pharmacologic Prophylaxis:   Pharmacologic: Rivaroxaban (Xarelto)  Mechanical VTE Prophylaxis in Place: No      Discussions with Specialists or Other Care Team Provider: discussed with case management    Education and Discussions with Family / Patient: patient updated    Time Spent for Care: 30 minutes.  More than 50% of total time spent on counseling and coordination of care as described above.    Current Length of Stay: 4 day(s)    Current Patient Status: Inpatient   Certification Statement: The patient will continue to require additional inpatient hospital stay due to awaiting bed at [facility]    Discharge Plan: planning discharge to [facility] when arrangements made, likely monday    Code Status: Level 1 - Full Code      Subjective:   Patient feels well. Denies pain. Eager to be discharged back to rehab.     Objective:     Vitals:   Temp (24hrs), Av.4 °F (36.9 °C), Min:97.9 °F (36.6 °C), Max:98.9 °F (37.2 °C)    Temp:  [97.9 °F (36.6 °C)-98.9 °F (37.2 °C)] 97.9 °F (36.6 °C)  HR:  [66-76] 66  Resp:  [14-18] 14  BP: ()/(50-56) 91/56  SpO2:  [95 %-98 %] 96 %  Body mass index is 28.91 kg/m².     Input and Output Summary (last 24 hours):       Intake/Output Summary (Last 24 hours) at 3/2/2024 1200  Last data filed at 3/2/2024 0347  Gross per 24 hour   Intake 480 ml   Output 1500 ml   Net -1020 ml       Physical Exam:     Physical Exam  Vitals reviewed.    Constitutional:       General: She is not in acute distress.     Appearance: She is not ill-appearing or diaphoretic.   HENT:      Head: Normocephalic and atraumatic.      Nose: Nose normal.      Mouth/Throat:      Pharynx: Oropharynx is clear.   Cardiovascular:      Rate and Rhythm: Normal rate.   Pulmonary:      Effort: Pulmonary effort is normal. No respiratory distress.      Breath sounds: Normal breath sounds.   Abdominal:      General: Bowel sounds are normal. There is no distension.      Palpations: Abdomen is soft.      Tenderness: There is no abdominal tenderness.   Musculoskeletal:         General: No signs of injury.   Skin:     General: Skin is warm and dry.      Comments: Operative dressing/wound vac in place   Neurological:      Mental Status: She is alert and oriented to person, place, and time.           Additional Data:     Labs:    Results from last 7 days   Lab Units 03/01/24  0550 02/29/24  0538   WBC Thousand/uL 9.46 7.60   HEMOGLOBIN g/dL 11.5 11.1*   HEMATOCRIT % 37.1 36.3   PLATELETS Thousands/uL 299 299   NEUTROS PCT %  --  53   LYMPHS PCT %  --  25   MONOS PCT %  --  8   EOS PCT %  --  13*     Results from last 7 days   Lab Units 03/01/24  0550 02/29/24  0538 02/28/24  0340   SODIUM mmol/L 140   < > 140   POTASSIUM mmol/L 4.1   < > 4.1   CHLORIDE mmol/L 101   < > 101   CO2 mmol/L 30   < > 31   BUN mg/dL 21   < > 17   CREATININE mg/dL 0.49*   < > 0.61   ANION GAP mmol/L 9   < > 8   CALCIUM mg/dL 9.8   < > 10.1   ALBUMIN g/dL  --   --  3.7   TOTAL BILIRUBIN mg/dL  --   --  0.27   ALK PHOS U/L  --   --  54   ALT U/L  --   --  4*   AST U/L  --   --  14   GLUCOSE RANDOM mg/dL 129   < > 128    < > = values in this interval not displayed.         Results from last 7 days   Lab Units 03/02/24  1121 03/02/24  0732 03/01/24  2050 03/01/24  1604 03/01/24  1059 03/01/24  0755 02/29/24  2102 02/29/24  1559 02/29/24  1153 02/29/24  0807 02/28/24 2047 02/28/24  1600   POC GLUCOSE mg/dl 154* 132 185*  145* 148* 119 156* 101 150* 110 171* 104     Results from last 7 days   Lab Units 02/28/24  0438   HEMOGLOBIN A1C % 6.0*               * I Have Reviewed All Lab Data Listed Above.  * Additional Pertinent Lab Tests Reviewed: All Labs For Current Hospital Admission Reviewed        Recent Cultures (last 7 days):           Last 24 Hours Medication List:   Current Facility-Administered Medications   Medication Dose Route Frequency Provider Last Rate    acetaminophen  975 mg Oral Q8H JILLIAN Mary Whyte, DPM      albuterol  2 puff Inhalation Q6H PRN Mary Whyte, NORBERTOM      aspirin  81 mg Oral Daily Mary Whyte, DPM      buPROPion  300 mg Oral Daily Mary Whyte, DPM      cefpodoxime  400 mg Oral BID With Meals Mary Whyte DPM      clonazePAM  1 mg Oral QPM Mary Whyte, DPM      cyanocobalamin  1,000 mcg Oral Daily Mary Whyte, DPM      fish oil  1,000 mg Oral Daily Mary Whyte, DPM      fluticasone-vilanterol  1 puff Inhalation Daily Mary Whyte, DPM      insulin lispro  1-5 Units Subcutaneous TID AC Mary Whyte, DPM      insulin lispro  1-5 Units Subcutaneous HS Mary Whyte, DPOLGA      methocarbamol  250 mg Oral Q8H JILLIAN Mary Whyte, DPM      multivitamin-minerals  1 tablet Oral Daily Mary Whyte, DPM      nicotine  1 patch Transdermal Daily Mary Whyte DPM      oxyCODONE  2.5 mg Oral Q4H PRN Mary Whyte DPM      Or    oxyCODONE  5 mg Oral Q4H PRN Mary Whyte, DPM      pantoprazole  40 mg Oral Early Morning NORBERTO DavisM      polyethylene glycol  17 g Oral Daily Mary Whyte, NORBERTOM      pregabalin  200 mg Oral TID Mary Whyte DPM      rivaroxaban  2.5 mg Oral BID Kavya Lang MD      senna-docusate sodium  1 tablet Oral BID Mary Whyte DPM          Today, Patient Was Seen By: Kirsten Bundy PA-C    ** Please Note:  Dictation voice to text software may have been used in the creation of this document. **

## 2024-03-02 NOTE — ASSESSMENT & PLAN NOTE
Right posterior ankle ulcer with exposed Achilles tendon s/p wound debridement on 2/4/24  Osteomyelitis of right second toe s/p amputation on 2/4/24  Transferred from [facility] for STSG. S/p STSG application 2/28/24 by Podiatry  Operative dressing to be changed by podiatry monday

## 2024-03-02 NOTE — CASE MANAGEMENT
Case Management Discharge Planning Note    Patient name Lona Cedeno  Location NW8 876/NW8 876-01 MRN [id number]  : 1945 Date 3/2/2024       Current Admission Date: 2024  Current Admission Diagnosis:Diabetic foot ulcer with osteomyelitis (HCC)   Patient Active Problem List    Diagnosis Date Noted    Sacral wound 2024    Impulsive 2024    Other dietary vitamin B12 deficiency anemia 2024    Leukocytosis 2024    Surgical site infection 2024    Sepsis without acute organ dysfunction (HCC) 2024    At risk for venous thromboembolism (VTE) 2024    At high risk for skin breakdown 2024    Wound of skin 2024    Impaired mobility and activities of daily living 2024    Acute pain due to injury 2024    Hematoma 2024    Anemia 2024    Constipation 2024    Preoperative cardiovascular examination 2024    Diabetic foot ulcer with osteomyelitis (HCC) 2024    Ankle ulcer, right, with fat layer exposed ([facility]) 2023    Age-related osteoporosis without current pathological fracture 2023    Abnormal CT of the chest 10/17/2023    COPD, severity to be determined ([facility]) 10/17/2023    Tobacco use disorder 10/17/2023    PAD (peripheral artery disease) ([facility]) 10/10/2023    Bladder neoplasm 2023    Left renal mass 2023    Lactic acidosis 2023    Hypomagnesemia 2023    Degenerative lumbar disc 2023    Urinary incontinence 2023    GERD without esophagitis 10/16/2019    Essential hypertension 02/15/2019    Anxiety and depression 02/15/2019    Type 2 diabetes mellitus with diabetic polyneuropathy (HCC) 2019      LOS (days): 4  Geometric Mean LOS (GMLOS) (days): 5.4  Days to GMLOS:1.4     OBJECTIVE:  Risk of Unplanned Readmission Score: 21.9         Current admission status: Inpatient   Preferred Pharmacy:   [id number] [address]  "[place]  [facility] [address]  Phone: [phone] Fax: [phone]    [facility] [place], [place] - [address]  [address] [place] [address]  Phone: [phone] Fax: [phone]    Primary Care Provider: Vivek Preston DO    Primary Insurance: [employer] RE[employer]  Secondary Insurance:     DISCHARGE DETAILS:                                          Other Referral/Resources/Interventions Provided:  Interventions: Acute Rehab       CM spoke with [facility] liaison regarding patient and admission decision.    CM was notified that \"she is preapproved for return to [facility] pending insurance auth - [facility] is closed over the weekend, so we won't be able to initiate until Monday at this point. and [facility] physician would prefer for 1st dressing change to be completed before coming back to [facility].\"    Prior auth can be submitted Monday AM and dressing change is due on Monday.    Primary team updated.    CM department will continue to follow to assist with discharge coordination.                                                                       "

## 2024-03-02 NOTE — ASSESSMENT & PLAN NOTE
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 First dressing change to be done by podiatry Monday. Podiatry note states they can change dressing at [facility] and she is stable for discharge but she needs insurance auth so won't be discharged before monday

## 2024-03-02 NOTE — ASSESSMENT & PLAN NOTE
Lab Results   Component Value Date    HGBA1C 6.0 (H) 02/28/2024       Recent Labs     03/01/24  1604 03/01/24 2050 03/02/24  0732 03/02/24  1121   POCGLU 145* 185* 132 154*         Blood Sugar Average: Last 72 hrs:  (P) 137  Continue SSI

## 2024-03-02 NOTE — [facility] ADMISSION
Reviewed patient's case with [facility] physician - patient is preapproved for return to [facility] pending 1st dressing change with Podiatry on Monday, 3/4, as well as functional progress, insurance authorization and bed availability. CM has been updated. Will continue to follow at this time.

## 2024-03-02 NOTE — ASSESSMENT & PLAN NOTE
Continue ASA, statin, Xarelto  Vascular managing right groin wound. Right groin wound vac removed as above, and recs for wound care

## 2024-03-02 NOTE — ASSESSMENT & PLAN NOTE
Resolved with repletion   Recheck labs tomorrow in anticipation for discharge to rehab early next week

## 2024-03-02 NOTE — PLAN OF CARE
Problem: PAIN - ADULT  Goal: Verbalizes/displays adequate comfort level or baseline comfort level  Description: Interventions:  - Encourage patient to monitor pain and request assistance  - Assess pain using appropriate pain scale  - Administer analgesics based on type and severity of pain and evaluate response  - Implement non-pharmacological measures as appropriate and evaluate response  - Consider cultural and social influences on pain and pain management  - Notify physician/advanced practitioner if interventions unsuccessful or patient reports new pain  Outcome: Progressing     Problem: INFECTION - ADULT  Goal: Absence or prevention of progression during hospitalization  Description: INTERVENTIONS:  - Assess and monitor for signs and symptoms of infection  - Monitor lab/diagnostic results  - Monitor all insertion sites, i.e. indwelling lines, tubes, and drains  - Monitor endotracheal if appropriate and nasal secretions for changes in amount and color  - [place] appropriate cooling/warming therapies per order  - Administer medications as ordered  - Instruct and encourage patient and family to use good hand hygiene technique  - Identify and instruct in appropriate isolation precautions for identified infection/condition  Outcome: Progressing  Goal: Absence of fever/infection during neutropenic period  Description: INTERVENTIONS:  - Monitor WBC    Outcome: Progressing     Problem: SAFETY ADULT  Goal: Patient will remain free of falls  Description: INTERVENTIONS:  - Educate patient/family on patient safety including physical limitations  - Instruct patient to call for assistance with activity   - Consult OT/PT to assist with strengthening/mobility   - Keep Call bell within reach  - Keep bed low and locked with side rails adjusted as appropriate  - Keep care items and personal belongings within reach  - Initiate and maintain comfort rounds  - Make Fall Risk Sign visible to staff  - Offer Toileting every 4 Hours,  in advance of need  - Initiate/Maintain 4alarm  - Obtain necessary fall risk management equipment: 4  - Apply yellow socks and bracelet for high fall risk patients  - Consider moving patient to room near nurses station  Outcome: Progressing  Goal: Maintain or return to baseline ADL function  Description: INTERVENTIONS:  -  Assess patient's ability to carry out ADLs; assess patient's baseline for ADL function and identify physical deficits which impact ability to perform ADLs (bathing, care of mouth/teeth, toileting, grooming, dressing, etc.)  - Assess/evaluate cause of self-care deficits   - Assess range of motion  - Assess patient's mobility; develop plan if impaired  - Assess patient's need for assistive devices and provide as appropriate  - Encourage maximum independence but intervene and supervise when necessary  - Involve family in performance of ADLs  - Assess for home care needs following discharge   - Consider OT consult to assist with ADL evaluation and planning for discharge  - Provide patient education as appropriate  Outcome: Progressing  Goal: Maintains/Returns to pre admission functional level  Description: INTERVENTIONS:  - Perform AM-PAC 6 Click Basic Mobility/ Daily Activity assessment daily.  - Set and communicate daily mobility goal to care team and patient/family/caregiver.   - Collaborate with rehabilitation services on mobility goals if consulted  - Perform Range of Motion 4 times a day.  - Reposition patient every 4 hours.  - Dangle patient 4 times a day  - Stand patient 4 times a day  - Ambulate patient 4 times a day  - Out of bed to chair 4 times a day   - Out of bed for meals 4 times a day  - Out of bed for toileting  - Record patient progress and toleration of activity level   Outcome: Progressing     Problem: DISCHARGE PLANNING  Goal: Discharge to home or other facility with appropriate resources  Description: INTERVENTIONS:  - Identify barriers to discharge w/patient and caregiver  -  Arrange for needed discharge resources and transportation as appropriate  - Identify discharge learning needs (meds, wound care, etc.)  - Arrange for interpretive services to assist at discharge as needed  - Refer to Case Management Department for coordinating discharge planning if the patient needs post-hospital services based on physician/advanced practitioner order or complex needs related to functional status, cognitive ability, or social support system  Outcome: Progressing     Problem: Knowledge Deficit  Goal: Patient/family/caregiver demonstrates understanding of disease process, treatment plan, medications, and discharge instructions  Description: Complete learning assessment and assess knowledge base.  Interventions:  - Provide teaching at level of understanding  - Provide teaching via preferred learning methods  Outcome: Progressing     Problem: Prexisting or High Potential for Compromised Skin Integrity  Goal: Skin integrity is maintained or improved  Description: INTERVENTIONS:  - Identify patients at risk for skin breakdown  - Assess and monitor skin integrity  - Assess and monitor nutrition and hydration status  - Monitor labs   - Assess for incontinence   - Turn and reposition patient  - Assist with mobility/ambulation  - Relieve pressure over bony prominences  - Avoid friction and shearing  - Provide appropriate hygiene as needed including keeping skin clean and dry  - Evaluate need for skin moisturizer/barrier cream  - Collaborate with interdisciplinary team   - Patient/family teaching  - Consider wound care consult   Outcome: Progressing     Problem: MOBILITY - ADULT  Goal: Maintain or return to baseline ADL function  Description: INTERVENTIONS:  -  Assess patient's ability to carry out ADLs; assess patient's baseline for ADL function and identify physical deficits which impact ability to perform ADLs (bathing, care of mouth/teeth, toileting, grooming, dressing, etc.)  - Assess/evaluate cause of  self-care deficits   - Assess range of motion  - Assess patient's mobility; develop plan if impaired  - Assess patient's need for assistive devices and provide as appropriate  - Encourage maximum independence but intervene and supervise when necessary  - Involve family in performance of ADLs  - Assess for home care needs following discharge   - Consider OT consult to assist with ADL evaluation and planning for discharge  - Provide patient education as appropriate  Outcome: Progressing  Goal: Maintains/Returns to pre admission functional level  Description: INTERVENTIONS:  - Perform AM-PAC 6 Click Basic Mobility/ Daily Activity assessment daily.  - Set and communicate daily mobility goal to care team and patient/family/caregiver.   - Collaborate with rehabilitation services on mobility goals if consulted  - Perform Range of Motion 4 times a day.  - Reposition patient every 4 hours.  - Dangle patient 4 times a day  - Stand patient 4 times a day  - Ambulate patient 4 times a day  - Out of bed to chair 4 times a day   - Out of bed for meals 4 times a day  - Out of bed for toileting  - Record patient progress and toleration of activity level   Outcome: Progressing

## 2024-03-03 VITALS
SYSTOLIC BLOOD PRESSURE: 110 MMHG | OXYGEN SATURATION: 90 % | HEIGHT: 61 IN | RESPIRATION RATE: 14 BRPM | TEMPERATURE: 98.4 F | BODY MASS INDEX: 28.89 KG/M2 | HEART RATE: 74 BPM | DIASTOLIC BLOOD PRESSURE: 58 MMHG | WEIGHT: 153 LBS

## 2024-03-03 LAB
ANION GAP SERPL CALCULATED.3IONS-SCNC: 6 MMOL/L
BUN SERPL-MCNC: 17 MG/DL (ref 5–25)
CALCIUM SERPL-MCNC: 9.5 MG/DL (ref 8.4–10.2)
CHLORIDE SERPL-SCNC: 103 MMOL/L (ref 96–108)
CO2 SERPL-SCNC: 31 MMOL/L (ref 21–32)
CREAT SERPL-MCNC: 0.48 MG/DL (ref 0.6–1.3)
ERYTHROCYTE [DISTWIDTH] IN BLOOD BY AUTOMATED COUNT: 14.8 % (ref 11.6–15.1)
GFR SERPL CREATININE-BSD FRML MDRD: 94 ML/MIN/1.73SQ M
GLUCOSE SERPL-MCNC: 110 MG/DL (ref 65–140)
GLUCOSE SERPL-MCNC: 140 MG/DL (ref 65–140)
GLUCOSE SERPL-MCNC: 140 MG/DL (ref 65–140)
GLUCOSE SERPL-MCNC: 160 MG/DL (ref 65–140)
GLUCOSE SERPL-MCNC: 168 MG/DL (ref 65–140)
HCT VFR BLD AUTO: 35.4 % (ref 34.8–46.1)
HGB BLD-MCNC: 11.1 G/DL (ref 11.5–15.4)
MAGNESIUM SERPL-MCNC: 1.5 MG/DL (ref 1.9–2.7)
MCH RBC QN AUTO: 31.3 PG (ref 26.8–34.3)
MCHC RBC AUTO-ENTMCNC: 31.4 G/DL (ref 31.4–37.4)
MCV RBC AUTO: 100 FL (ref 82–98)
PLATELET # BLD AUTO: 277 THOUSANDS/UL (ref 149–390)
PMV BLD AUTO: 10.2 FL (ref 8.9–12.7)
POTASSIUM SERPL-SCNC: 4.2 MMOL/L (ref 3.5–5.3)
RBC # BLD AUTO: 3.55 MILLION/UL (ref 3.81–5.12)
SODIUM SERPL-SCNC: 140 MMOL/L (ref 135–147)
WBC # BLD AUTO: 8.83 THOUSAND/UL (ref 4.31–10.16)

## 2024-03-03 PROCEDURE — 99233 SBSQ HOSP IP/OBS HIGH 50: CPT | Performed by: PHYSICIAN ASSISTANT

## 2024-03-03 PROCEDURE — 97530 THERAPEUTIC ACTIVITIES: CPT

## 2024-03-03 PROCEDURE — 82948 REAGENT STRIP/BLOOD GLUCOSE: CPT

## 2024-03-03 PROCEDURE — 97112 NEUROMUSCULAR REEDUCATION: CPT

## 2024-03-03 PROCEDURE — 83735 ASSAY OF MAGNESIUM: CPT | Performed by: PHYSICIAN ASSISTANT

## 2024-03-03 PROCEDURE — 97535 SELF CARE MNGMENT TRAINING: CPT

## 2024-03-03 PROCEDURE — 85027 COMPLETE CBC AUTOMATED: CPT | Performed by: PHYSICIAN ASSISTANT

## 2024-03-03 PROCEDURE — 97116 GAIT TRAINING THERAPY: CPT

## 2024-03-03 PROCEDURE — 80048 BASIC METABOLIC PNL TOTAL CA: CPT | Performed by: PHYSICIAN ASSISTANT

## 2024-03-03 RX ORDER — MAGNESIUM SULFATE HEPTAHYDRATE 40 MG/ML
2 INJECTION, SOLUTION INTRAVENOUS ONCE
Status: COMPLETED | OUTPATIENT
Start: 2024-03-03 | End: 2024-03-03

## 2024-03-03 RX ADMIN — PREGABALIN 200 MG: 100 CAPSULE ORAL at 08:20

## 2024-03-03 RX ADMIN — INSULIN LISPRO 1 UNITS: 100 INJECTION, SOLUTION INTRAVENOUS; SUBCUTANEOUS at 12:17

## 2024-03-03 RX ADMIN — OMEGA-3 FATTY ACIDS CAP 1000 MG 1000 MG: 1000 CAP at 08:20

## 2024-03-03 RX ADMIN — CLONAZEPAM 1 MG: 1 TABLET ORAL at 17:00

## 2024-03-03 RX ADMIN — FLUTICASONE FUROATE AND VILANTEROL TRIFENATATE 1 PUFF: 200; 25 POWDER RESPIRATORY (INHALATION) at 08:19

## 2024-03-03 RX ADMIN — ASPIRIN 81 MG CHEWABLE TABLET 81 MG: 81 TABLET CHEWABLE at 08:20

## 2024-03-03 RX ADMIN — SENNOSIDES, DOCUSATE SODIUM 1 TABLET: 8.6; 5 TABLET ORAL at 17:00

## 2024-03-03 RX ADMIN — ACETAMINOPHEN 975 MG: 325 TABLET, FILM COATED ORAL at 21:32

## 2024-03-03 RX ADMIN — ACETAMINOPHEN 975 MG: 325 TABLET, FILM COATED ORAL at 06:11

## 2024-03-03 RX ADMIN — METHOCARBAMOL 250 MG: 500 TABLET ORAL at 21:32

## 2024-03-03 RX ADMIN — CYANOCOBALAMIN TAB 500 MCG 1000 MCG: 500 TAB at 08:20

## 2024-03-03 RX ADMIN — CEFPODOXIME PROXETIL 400 MG: 200 TABLET, FILM COATED ORAL at 17:00

## 2024-03-03 RX ADMIN — Medication 1 TABLET: at 08:20

## 2024-03-03 RX ADMIN — ACETAMINOPHEN 975 MG: 325 TABLET, FILM COATED ORAL at 13:59

## 2024-03-03 RX ADMIN — NICOTINE 1 PATCH: 14 PATCH, EXTENDED RELEASE TRANSDERMAL at 08:19

## 2024-03-03 RX ADMIN — METHOCARBAMOL 250 MG: 500 TABLET ORAL at 13:59

## 2024-03-03 RX ADMIN — METHOCARBAMOL 250 MG: 500 TABLET ORAL at 06:11

## 2024-03-03 RX ADMIN — POLYETHYLENE GLYCOL 3350 17 G: 17 POWDER, FOR SOLUTION ORAL at 08:20

## 2024-03-03 RX ADMIN — RIVAROXABAN 2.5 MG: 2.5 TABLET, FILM COATED ORAL at 17:00

## 2024-03-03 RX ADMIN — PREGABALIN 200 MG: 100 CAPSULE ORAL at 21:32

## 2024-03-03 RX ADMIN — PANTOPRAZOLE SODIUM 40 MG: 40 TABLET, DELAYED RELEASE ORAL at 06:11

## 2024-03-03 RX ADMIN — SENNOSIDES, DOCUSATE SODIUM 1 TABLET: 8.6; 5 TABLET ORAL at 08:20

## 2024-03-03 RX ADMIN — MAGNESIUM SULFATE HEPTAHYDRATE 2 G: 40 INJECTION, SOLUTION INTRAVENOUS at 10:13

## 2024-03-03 RX ADMIN — INSULIN LISPRO 1 UNITS: 100 INJECTION, SOLUTION INTRAVENOUS; SUBCUTANEOUS at 21:32

## 2024-03-03 RX ADMIN — BUPROPION HYDROCHLORIDE 300 MG: 150 TABLET, FILM COATED, EXTENDED RELEASE ORAL at 08:20

## 2024-03-03 RX ADMIN — CEFPODOXIME PROXETIL 400 MG: 200 TABLET, FILM COATED ORAL at 08:20

## 2024-03-03 RX ADMIN — RIVAROXABAN 2.5 MG: 2.5 TABLET, FILM COATED ORAL at 08:20

## 2024-03-03 RX ADMIN — PREGABALIN 200 MG: 100 CAPSULE ORAL at 17:00

## 2024-03-03 NOTE — PLAN OF CARE
Problem: PAIN - ADULT  Goal: Verbalizes/displays adequate comfort level or baseline comfort level  Description: Interventions:  - Encourage patient to monitor pain and request assistance  - Assess pain using appropriate pain scale  - Administer analgesics based on type and severity of pain and evaluate response  - Implement non-pharmacological measures as appropriate and evaluate response  - Consider cultural and social influences on pain and pain management  - Notify physician/advanced practitioner if interventions unsuccessful or patient reports new pain  Outcome: Progressing     Problem: INFECTION - ADULT  Goal: Absence or prevention of progression during hospitalization  Description: INTERVENTIONS:  - Assess and monitor for signs and symptoms of infection  - Monitor lab/diagnostic results  - Monitor all insertion sites, i.e. indwelling lines, tubes, and drains  - Monitor endotracheal if appropriate and nasal secretions for changes in amount and color  - [place] appropriate cooling/warming therapies per order  - Administer medications as ordered  - Instruct and encourage patient and family to use good hand hygiene technique  - Identify and instruct in appropriate isolation precautions for identified infection/condition  Outcome: Progressing  Goal: Absence of fever/infection during neutropenic period  Description: INTERVENTIONS:  - Monitor WBC    Outcome: Progressing     Problem: SAFETY ADULT  Goal: Patient will remain free of falls  Description: INTERVENTIONS:  - Educate patient/family on patient safety including physical limitations  - Instruct patient to call for assistance with activity   - Consult OT/PT to assist with strengthening/mobility   - Keep Call bell within reach  - Keep bed low and locked with side rails adjusted as appropriate  - Keep care items and personal belongings within reach  - Initiate and maintain comfort rounds  - Make Fall Risk Sign visible to staff  - Offer Toileting every 4 Hours,  in advance of need  - Initiate/Maintain 4alarm  - Obtain necessary fall risk management equipment: 4  - Apply yellow socks and bracelet for high fall risk patients  - Consider moving patient to room near nurses station  Outcome: Progressing  Goal: Maintain or return to baseline ADL function  Description: INTERVENTIONS:  -  Assess patient's ability to carry out ADLs; assess patient's baseline for ADL function and identify physical deficits which impact ability to perform ADLs (bathing, care of mouth/teeth, toileting, grooming, dressing, etc.)  - Assess/evaluate cause of self-care deficits   - Assess range of motion  - Assess patient's mobility; develop plan if impaired  - Assess patient's need for assistive devices and provide as appropriate  - Encourage maximum independence but intervene and supervise when necessary  - Involve family in performance of ADLs  - Assess for home care needs following discharge   - Consider OT consult to assist with ADL evaluation and planning for discharge  - Provide patient education as appropriate  Outcome: Progressing  Goal: Maintains/Returns to pre admission functional level  Description: INTERVENTIONS:  - Perform AM-PAC 6 Click Basic Mobility/ Daily Activity assessment daily.  - Set and communicate daily mobility goal to care team and patient/family/caregiver.   - Collaborate with rehabilitation services on mobility goals if consulted  - Perform Range of Motion 4 times a day.  - Reposition patient every 4 hours.  - Dangle patient 4 times a day  - Stand patient 4 times a day  - Ambulate patient 4 times a day  - Out of bed to chair 4 times a day   - Out of bed for meals 4 times a day  - Out of bed for toileting  - Record patient progress and toleration of activity level   Outcome: Progressing     Problem: DISCHARGE PLANNING  Goal: Discharge to home or other facility with appropriate resources  Description: INTERVENTIONS:  - Identify barriers to discharge w/patient and caregiver  -  Arrange for needed discharge resources and transportation as appropriate  - Identify discharge learning needs (meds, wound care, etc.)  - Arrange for interpretive services to assist at discharge as needed  - Refer to Case Management Department for coordinating discharge planning if the patient needs post-hospital services based on physician/advanced practitioner order or complex needs related to functional status, cognitive ability, or social support system  Outcome: Progressing     Problem: Knowledge Deficit  Goal: Patient/family/caregiver demonstrates understanding of disease process, treatment plan, medications, and discharge instructions  Description: Complete learning assessment and assess knowledge base.  Interventions:  - Provide teaching at level of understanding  - Provide teaching via preferred learning methods  Outcome: Progressing     Problem: Prexisting or High Potential for Compromised Skin Integrity  Goal: Skin integrity is maintained or improved  Description: INTERVENTIONS:  - Identify patients at risk for skin breakdown  - Assess and monitor skin integrity  - Assess and monitor nutrition and hydration status  - Monitor labs   - Assess for incontinence   - Turn and reposition patient  - Assist with mobility/ambulation  - Relieve pressure over bony prominences  - Avoid friction and shearing  - Provide appropriate hygiene as needed including keeping skin clean and dry  - Evaluate need for skin moisturizer/barrier cream  - Collaborate with interdisciplinary team   - Patient/family teaching  - Consider wound care consult   Outcome: Progressing     Problem: MOBILITY - ADULT  Goal: Maintain or return to baseline ADL function  Description: INTERVENTIONS:  -  Assess patient's ability to carry out ADLs; assess patient's baseline for ADL function and identify physical deficits which impact ability to perform ADLs (bathing, care of mouth/teeth, toileting, grooming, dressing, etc.)  - Assess/evaluate cause of  self-care deficits   - Assess range of motion  - Assess patient's mobility; develop plan if impaired  - Assess patient's need for assistive devices and provide as appropriate  - Encourage maximum independence but intervene and supervise when necessary  - Involve family in performance of ADLs  - Assess for home care needs following discharge   - Consider OT consult to assist with ADL evaluation and planning for discharge  - Provide patient education as appropriate  Outcome: Progressing  Goal: Maintains/Returns to pre admission functional level  Description: INTERVENTIONS:  - Perform AM-PAC 6 Click Basic Mobility/ Daily Activity assessment daily.  - Set and communicate daily mobility goal to care team and patient/family/caregiver.   - Collaborate with rehabilitation services on mobility goals if consulted  - Perform Range of Motion 4 times a day.  - Reposition patient every 4 hours.  - Dangle patient 4 times a day  - Stand patient 4 times a day  - Ambulate patient 4 times a day  - Out of bed to chair 4 times a day   - Out of bed for meals 4 times a day  - Out of bed for toileting  - Record patient progress and toleration of activity level   Outcome: Progressing

## 2024-03-03 NOTE — PROGRESS NOTES
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 First dressing change to be done by podiatry Monday. Podiatry note states they can change dressing at [facility] and she is stable for discharge but she needs insurance auth so won't be discharged before monday    Tobacco use disorder  Assessment & Plan  Continue nicotine patch  Encourage Smoking cessation     COPD, severity to be determined ([facility])  Assessment & Plan  No exacerbation  Home Advair replaced by equivalent formulary Breo  Continue albuterol as needed    PAD (peripheral artery disease) ([facility])  Assessment & Plan  Continue ASA, statin, Xarelto  Vascular managing right groin wound. Right groin wound vac removed as above, and recs for wound care     Hypomagnesemia  Assessment & Plan  Mag 1.5 today  Replete  Recheck labs in AM    GERD without esophagitis  Assessment & Plan  Continue PPI    Anxiety and depression  Assessment & Plan  Continue Wellbutrin  mg daily, Clonazepam 1 mg daily    Type 2 diabetes mellitus with diabetic polyneuropathy ([facility])  Assessment & Plan  Lab Results   Component Value Date    HGBA1C 6.0 (H) 02/28/2024       Recent Labs      "24  1121 24  1715 24  2108 24  0645   POCGLU 154* 100 237* 140         Blood Sugar Average: Last 72 hrs:  (P) [id number]  Continue SSI    * Diabetic foot ulcer with osteomyelitis (HCC)  Assessment & Plan  Right posterior ankle ulcer with exposed Achilles tendon s/p wound debridement on 24  Osteomyelitis of right second toe s/p amputation on 24  Transferred from [facility] for STSG. S/p STSG application 24 by Podiatry  Operative dressing to be changed by podiatry monday               VTE Pharmacologic Prophylaxis:   Pharmacologic: Rivaroxaban (Xarelto)  Mechanical VTE Prophylaxis in Place: No        Discussions with Specialists or Other Care Team Provider: reviewed podiatry notes    Education and Discussions with Family / Patient: patient updated, declined call to     Time Spent for Care: 30 minutes.  More than 50% of total time spent on counseling and coordination of care as described above.    Current Length of Stay: 5 day(s)    Current Patient Status: Inpatient   Certification Statement: The patient will continue to require additional inpatient hospital stay due to awaiting discharge planning/bed availablity    Discharge Plan: to [facility] when arrangements made, likely monday    Code Status: Level 1 - Full Code      Subjective:   Patient feeling well, eager to go to rehab and ultimately back home. States she has been away from home for \"too long\". Denies pain. Eating well    Objective:     Vitals:   Temp (24hrs), Av.2 °F (36.8 °C), Min:98 °F (36.7 °C), Max:98.4 °F (36.9 °C)    Temp:  [98 °F (36.7 °C)-98.4 °F (36.9 °C)] 98.4 °F (36.9 °C)  HR:  [66-75] 66  Resp:  [15-16] 15  BP: (124-131)/(64-67) 131/64  SpO2:  [92 %-97 %] 97 %  Body mass index is 28.91 kg/m².     Input and Output Summary (last 24 hours):       Intake/Output Summary (Last 24 hours) at 3/3/2024 1010  Last data filed at 3/3/2024 0601  Gross per 24 hour   Intake --   Output 1650 ml   Net -1650 ml "       Physical Exam:     Physical Exam  Vitals reviewed.   Constitutional:       General: She is not in acute distress.     Appearance: She is not ill-appearing or diaphoretic.   HENT:      Head: Normocephalic and atraumatic.      Nose: Nose normal.      Mouth/Throat:      Pharynx: Oropharynx is clear.   Cardiovascular:      Rate and Rhythm: Normal rate.   Pulmonary:      Effort: Pulmonary effort is normal. No respiratory distress.      Breath sounds: Normal breath sounds.   Abdominal:      General: Bowel sounds are normal. There is no distension.      Palpations: Abdomen is soft.      Tenderness: There is no abdominal tenderness.   Musculoskeletal:         General: No deformity or signs of injury.   Skin:     General: Skin is warm and dry.      Comments: Operative dressing/wound vac intact   Neurological:      Mental Status: She is alert and oriented to person, place, and time.           Additional Data:     Labs:    Results from last 7 days   Lab Units 03/03/24  0631 03/01/24  0550 02/29/24  0538   WBC Thousand/uL 8.83   < > 7.60   HEMOGLOBIN g/dL 11.1*   < > 11.1*   HEMATOCRIT % 35.4   < > 36.3   PLATELETS Thousands/uL 277   < > 299   NEUTROS PCT %  --   --  53   LYMPHS PCT %  --   --  25   MONOS PCT %  --   --  8   EOS PCT %  --   --  13*    < > = values in this interval not displayed.     Results from last 7 days   Lab Units 03/03/24  0631 02/29/24  0538 02/28/24  0340   SODIUM mmol/L 140   < > 140   POTASSIUM mmol/L 4.2   < > 4.1   CHLORIDE mmol/L 103   < > 101   CO2 mmol/L 31   < > 31   BUN mg/dL 17   < > 17   CREATININE mg/dL 0.48*   < > 0.61   ANION GAP mmol/L 6   < > 8   CALCIUM mg/dL 9.5   < > 10.1   ALBUMIN g/dL  --   --  3.7   TOTAL BILIRUBIN mg/dL  --   --  0.27   ALK PHOS U/L  --   --  54   ALT U/L  --   --  4*   AST U/L  --   --  14   GLUCOSE RANDOM mg/dL 140   < > 128    < > = values in this interval not displayed.         Results from last 7 days   Lab Units 03/03/24  0645 03/02/24  8518  03/02/24  1715 03/02/24  1121 03/02/24  0732 03/01/24  2050 03/01/24  1604 03/01/24  1059 03/01/24  0755 02/29/24  2102 02/29/24  1559 02/29/24  1153   POC GLUCOSE mg/dl 140 237* 100 154* 132 185* 145* 148* 119 156* 101 150*     Results from last 7 days   Lab Units 02/28/24  0438   HEMOGLOBIN A1C % 6.0*               * I Have Reviewed All Lab Data Listed Above.  * Additional Pertinent Lab Tests Reviewed: All Labs For Current Hospital Admission Reviewed        Recent Cultures (last 7 days):           Last 24 Hours Medication List:   Current Facility-Administered Medications   Medication Dose Route Frequency Provider Last Rate    acetaminophen  975 mg Oral Q8H JILLIAN NORBERTO DavisM      albuterol  2 puff Inhalation Q6H PRN Mary Whyte DPM      aspirin  81 mg Oral Daily Mary Whyte DPM      buPROPion  300 mg Oral Daily Mary Whyte DPM      cefpodoxime  400 mg Oral BID With Meals Mary Whyte DPM      clonazePAM  1 mg Oral QPM Mary Whyte DPM      cyanocobalamin  1,000 mcg Oral Daily Mary Whyte DPM      fish oil  1,000 mg Oral Daily Mary Whyte DPM      fluticasone-vilanterol  1 puff Inhalation Daily Mary Whyte DPM      insulin lispro  1-5 Units Subcutaneous TID AC Mary Whyte DPM      insulin lispro  1-5 Units Subcutaneous HS Mary Whyte DPM      magnesium sulfate  2 g Intravenous Once Kirsten Bundy PA-C      methocarbamol  250 mg Oral Q8H JILLIAN Mary Whyte DPM      multivitamin-minerals  1 tablet Oral Daily Mary Whyte DPM      nicotine  1 patch Transdermal Daily Mary Whyte DPM      oxyCODONE  2.5 mg Oral Q4H PRN Mary Whyte DPM      Or    oxyCODONE  5 mg Oral Q4H PRN Mary Whyte DPM      pantoprazole  40 mg Oral Early Morning Mary Whyte DPM      polyethylene glycol  17 g Oral Daily Mary Esther Bosico, DPM      pregabalin  200 mg  Oral TID Mary Whyte DPM      rivaroxaban  2.5 mg Oral BID Kavya Lang MD      senna-docusate sodium  1 tablet Oral BID Mary Whyte DPM          Today, Patient Was Seen By: Kirsten Bundy PA-C    ** Please Note: Dictation voice to text software may have been used in the creation of this document. **

## 2024-03-03 NOTE — PLAN OF CARE
Problem: PHYSICAL THERAPY ADULT  Goal: Performs mobility at highest level of function for planned discharge setting.  See evaluation for individualized goals.  Description: Treatment/Interventions: Functional transfer training, Therapeutic exercise, Endurance training, Gait training, Bed mobility, Equipment eval/education  Equipment Recommended: Wheelchair, Walker       See flowsheet documentation for full assessment, interventions and recommendations.  Outcome: Progressing  Note: Prognosis: Good  Problem List: Decreased strength, Decreased range of motion, Decreased endurance, Impaired balance, Decreased mobility, Impaired hearing, Orthopedic restrictions, Decreased skin integrity, Decreased safety awareness  Assessment: The patient was able to demonstrate progression of her mobility today as she was able to ambulate short distances. She did require increased assistance as she fatigued within the first few steps. The patient was able to maintain NWB RLE throughout the session. She remains limited from her baseline, and she will benefit from continued therapies in order to maximize her functional independence.  Barriers to Discharge: Inaccessible home environment, Decreased caregiver support     Rehab Resource Intensity Level, PT: I (Maximum Resource Intensity)    See flowsheet documentation for full assessment.

## 2024-03-03 NOTE — ASSESSMENT & PLAN NOTE
Lab Results   Component Value Date    HGBA1C 6.0 (H) 02/28/2024       Recent Labs     03/02/24  1121 03/02/24  1715 03/02/24  2108 03/03/24  0645   POCGLU 154* 100 237* 140         Blood Sugar Average: Last 72 hrs:  (P) [id number]  Continue SSI

## 2024-03-03 NOTE — ASSESSMENT & PLAN NOTE
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 First dressing change to be done by podiatry Monday. Podiatry note states they can change dressing at [facility] and she is stable for discharge but she needs insurance auth so won't be discharged before monday

## 2024-03-03 NOTE — PHYSICAL THERAPY NOTE
Physical Therapy Progress Note     03/03/24 1140   PT Last Visit   PT Visit Date 03/03/24   Note Type   Note Type Treatment   Pain Assessment   Pain Assessment Tool 0-10   Pain Score No Pain   Restrictions/Precautions   RLE Weight Bearing Per Order NWB   Other Precautions Pain;Fall Risk;WBS   Subjective   Subjective The patient requesting to utilize the bedpan. She was amenable to use a commode and then sit up in the chair for lunch.   Transfers   Sit to Stand 4  Minimal assistance   Additional items Assist x 1;Increased time required;Verbal cues   Stand to Sit 4  Minimal assistance   Additional items Assist x 1;Increased time required;Verbal cues   Stand pivot 4  Minimal assistance   Additional items Assist x 1;Increased time required;Verbal cues   Ambulation/Elevation   Gait pattern Excessively slow;Step to;Short stride;Inconsistent rubina;Shuffling;Decreased foot clearance   Gait Assistance 3  Moderate assist   Additional items Assist x 1;Verbal cues;Tactile cues   Assistive Device Rolling walker   Distance 4 feet, 6 feet, 4 feet, 5 feet.   Balance   Static Sitting Normal   Dynamic Sitting Good   Static Standing Fair   Dynamic Standing Poor +   Ambulatory Poor   Activity Tolerance   Activity Tolerance Patient tolerated treatment well;Patient limited by fatigue   Nurse Made Aware Yes.   Exercises   Knee AROM Long Arc Quad Sitting;10 reps;AROM;Bilateral   Assessment   Prognosis Good   Problem List Decreased strength;Decreased range of motion;Decreased endurance;Impaired balance;Decreased mobility;Impaired hearing;Orthopedic restrictions;Decreased skin integrity;Decreased safety awareness   Assessment The patient was able to demonstrate progression of her mobility today as she was able to ambulate short distances. She did require increased assistance as she fatigued within the first few steps. The patient was able to maintain NWB RLE throughout the session. She remains limited from her baseline, and she will  benefit from continued therapies in order to maximize her functional independence.   Barriers to Discharge Inaccessible home environment;Decreased caregiver support   Goals   Patient Goals To get better, and to finally get home.   LTG Expiration Date 03/10/24   PT Treatment Day 4   Plan   Treatment/Interventions Functional transfer training;LE strengthening/ROM;Therapeutic exercise;Endurance training;Patient/family training;Bed mobility;Gait training;Elevations   Progress Progressing toward goals   PT Frequency 3-5x/wk   Discharge Recommendation   Rehab Resource Intensity Level, PT I (Maximum Resource Intensity)   AM-PAC Basic Mobility Inpatient   Turning in Flat Bed Without Bedrails 3   Lying on Back to Sitting on Edge of Flat Bed Without Bedrails 3   Moving Bed to Chair 3   Standing Up From Chair Using Arms 3   Walk in Room 2   Climb 3-5 Stairs With Railing 1   Basic Mobility Inpatient Raw Score 15   Basic Mobility Standardized Score 36.97   Highest Level Of Mobility   JH-HLM Goal 4: Move to chair/commode   JH-HLM Achieved 6: Walk 10 steps or more         An AM-PAC Basic Mobility raw score less than 16 suggests the patient may benefit from discharge to post-acute rehab services.    George Bhatti, PTA

## 2024-03-03 NOTE — OCCUPATIONAL THERAPY NOTE
"  Occupational Therapy Progress Note     Patient Name: Lona Cedeno  Today's Date: 3/3/2024  Problem List  Principal Problem:    Diabetic foot ulcer with osteomyelitis (HCC)  Active Problems:    Type 2 diabetes mellitus with diabetic polyneuropathy (HCC)    Anxiety and depression    GERD without esophagitis    Hypomagnesemia    PAD (peripheral artery disease) (HCC)    COPD, severity to be determined (HCC)    Tobacco use disorder    Surgical site infection    Sacral wound         Occupational Therapy Treatment Note     03/03/24 1143   OT Last Visit   OT Visit Date 03/03/24   Note Type   Note Type Treatment for insurance authorization   Pain Assessment   Pain Assessment Tool 0-10   Pain Score No Pain   Restrictions/Precautions   Weight Bearing Precautions Per Order Yes   RLE Weight Bearing Per Order NWB   Braces or Orthoses   (R prevalon boot, R wound vac x2)   Lifestyle   Autonomy Pt reports (I) with ADLs, IADLs and functional mobility with rollator. Pt +   Reciprocal Relationships Family   Service to Others retired   Intrinsic Gratification Pt enjoys reading   ADL   Where Assessed Commode   Grooming Assistance 5  Supervision/Setup   UB Bathing Assistance 5  Supervision/Setup   LB Bathing Assistance 4  Minimal Assistance   UB Dressing Assistance 4  Minimal Assistance   UB Dressing Deficit Thread RUE;Thread LUE;Pull around back   LB Dressing Assistance 2  Maximal Assistance   Toileting Assistance  4  Minimal Assistance   Toileting Deficit Clothing management up;Clothing management down;Perineal hygiene   Bed Mobility   Supine to Sit 5  Supervision   Additional items Assist x 1   Transfers   Sit to Stand 4  Minimal assistance   Additional items Assist x 1   Stand to Sit 4  Minimal assistance   Additional items Assist x 1   Stand pivot 4  Minimal assistance   Additional items Assist x 1   Subjective   Subjective pt stated \" I was justing waiting for the bed pan\"   Cognition   Overall Cognitive Status WFL "   Arousal/Participation Alert;Responsive;Cooperative   Attention Within functional limits   Orientation Level Oriented X4   Memory Within functional limits   Following Commands Follows all commands and directions without difficulty   Activity Tolerance   Activity Tolerance Patient tolerated treatment well   Assessment   Assessment Pt seen for Occupational Therapy session with focus on activity tolerance, bed mob, functional transfers/mob, sitting balance and tolerance and standing tolerance and balance for pt engagement in UB/LB self-care tasks  and toileting/toilet transfers. Pt cleared by PAULINA/ Ricardo for pt participated in OT session. Pt presented supine/HOB raised pt awake/alert and agreeable to participate in therapy following pt identifiers confirmed.  Pt reported her therapy goal to  go home to her cats and her dog.  Pt  reported need to for a bed pan however agreeable to use bedside commode when offered. Pt required assist for commode transfers 2*decreased coordination and balance. Pt able to void.   Pt will benefit from post acute rehab service to continue to address these above noted pt deficit which currently impair pt ADL and functional mob. Pt agreeable to sit up to the bedside chair post session and all needs within pt reach   Plan   Treatment Interventions ADL retraining   Goal Expiration Date 03/14/24   OT Treatment Day 2   OT Frequency 2-3x/wk   Discharge Recommendation   Rehab Resource Intensity Level, OT I (Maximum Resource Intensity)   AM-PAC Daily Activity Inpatient   Lower Body Dressing 2   Bathing 2   Toileting 2   Upper Body Dressing 3   Grooming 4   Eating 4   Daily Activity Raw Score 17   Daily Activity Standardized Score (Calc for Raw Score >=11) 37.26   AM-PAC Applied Cognition Inpatient   Following a Speech/Presentation 4   Understanding Ordinary Conversation 4   Taking Medications 4   Remembering Where Things Are Placed or Put Away 4   Remembering List of 4-5 Errands 4   Taking Care of  Complicated Tasks 4   Applied Cognition Raw Score 24   Applied Cognition Standardized Score 62.21         Kellee LANDRY/CHELI

## 2024-03-03 NOTE — PLAN OF CARE
Problem: OCCUPATIONAL THERAPY ADULT  Goal: Performs self-care activities at highest level of function for planned discharge setting.  See evaluation for individualized goals.  Description: Treatment Interventions: ADL retraining          See flowsheet documentation for full assessment, interventions and recommendations.   Outcome: Progressing  Note: Limitation: Decreased ADL status, Decreased endurance, Decreased self-care trans, Decreased high-level ADLs  Prognosis: Good  Assessment: Pt seen for Occupational Therapy session with focus on activity tolerance, bed mob, functional transfers/mob, sitting balance and tolerance and standing tolerance and balance for pt engagement in UB/LB self-care tasks  and toileting/toilet transfers. Pt cleared by PAULINA/ Ricardo for pt participated in OT session. Pt presented supine/HOB raised pt awake/alert and agreeable to participate in therapy following pt identifiers confirmed.  Pt reported her therapy goal to  go home to her cats and her dog.  Pt  reported need to for a bed pan however agreeable to use bedside commode when offered. Pt required assist for commode transfers 2*decreased coordination and balance. Pt able to void.   Pt will benefit from post acute rehab service to continue to address these above noted pt deficit which currently impair pt ADL and functional mob. Pt agreeable to sit up to the bedside chair post session and all needs within pt reach     Rehab Resource Intensity Level, OT: I (Maximum Resource Intensity)

## 2024-03-03 NOTE — ASSESSMENT & PLAN NOTE
Right posterior ankle ulcer with exposed Achilles tendon s/p wound debridement on 2/4/24  Osteomyelitis of right second toe s/p amputation on 2/4/24  Transferred from [facility] for STSG. S/p STSG application 2/28/24 by Podiatry  Operative dressing to be changed by podiatry monday

## 2024-03-04 LAB
ANION GAP SERPL CALCULATED.3IONS-SCNC: 6 MMOL/L
BUN SERPL-MCNC: 21 MG/DL (ref 5–25)
CALCIUM SERPL-MCNC: 9.3 MG/DL (ref 8.4–10.2)
CHLORIDE SERPL-SCNC: 101 MMOL/L (ref 96–108)
CO2 SERPL-SCNC: 30 MMOL/L (ref 21–32)
CREAT SERPL-MCNC: 0.47 MG/DL (ref 0.6–1.3)
ERYTHROCYTE [DISTWIDTH] IN BLOOD BY AUTOMATED COUNT: 14.7 % (ref 11.6–15.1)
GFR SERPL CREATININE-BSD FRML MDRD: 95 ML/MIN/1.73SQ M
GLUCOSE SERPL-MCNC: 137 MG/DL (ref 65–140)
GLUCOSE SERPL-MCNC: 152 MG/DL (ref 65–140)
GLUCOSE SERPL-MCNC: 153 MG/DL (ref 65–140)
GLUCOSE SERPL-MCNC: 161 MG/DL (ref 65–140)
GLUCOSE SERPL-MCNC: 196 MG/DL (ref 65–140)
HCT VFR BLD AUTO: 37.9 % (ref 34.8–46.1)
HGB BLD-MCNC: 11.6 G/DL (ref 11.5–15.4)
MAGNESIUM SERPL-MCNC: 1.7 MG/DL (ref 1.9–2.7)
MCH RBC QN AUTO: 31.2 PG (ref 26.8–34.3)
MCHC RBC AUTO-ENTMCNC: 30.6 G/DL (ref 31.4–37.4)
MCV RBC AUTO: 102 FL (ref 82–98)
PLATELET # BLD AUTO: 280 THOUSANDS/UL (ref 149–390)
PMV BLD AUTO: 10.1 FL (ref 8.9–12.7)
POTASSIUM SERPL-SCNC: 4.1 MMOL/L (ref 3.5–5.3)
RBC # BLD AUTO: 3.72 MILLION/UL (ref 3.81–5.12)
SODIUM SERPL-SCNC: 137 MMOL/L (ref 135–147)
WBC # BLD AUTO: 9.33 THOUSAND/UL (ref 4.31–10.16)

## 2024-03-04 PROCEDURE — 80048 BASIC METABOLIC PNL TOTAL CA: CPT | Performed by: PHYSICIAN ASSISTANT

## 2024-03-04 PROCEDURE — 97116 GAIT TRAINING THERAPY: CPT

## 2024-03-04 PROCEDURE — 82948 REAGENT STRIP/BLOOD GLUCOSE: CPT

## 2024-03-04 PROCEDURE — 85027 COMPLETE CBC AUTOMATED: CPT | Performed by: PHYSICIAN ASSISTANT

## 2024-03-04 PROCEDURE — 83735 ASSAY OF MAGNESIUM: CPT | Performed by: PHYSICIAN ASSISTANT

## 2024-03-04 PROCEDURE — 99232 SBSQ HOSP IP/OBS MODERATE 35: CPT | Performed by: STUDENT IN AN ORGANIZED HEALTH CARE EDUCATION/TRAINING PROGRAM

## 2024-03-04 PROCEDURE — 99024 POSTOP FOLLOW-UP VISIT: CPT | Performed by: PODIATRIST

## 2024-03-04 PROCEDURE — 97110 THERAPEUTIC EXERCISES: CPT

## 2024-03-04 RX ORDER — MAGNESIUM SULFATE HEPTAHYDRATE 40 MG/ML
2 INJECTION, SOLUTION INTRAVENOUS ONCE
Status: COMPLETED | OUTPATIENT
Start: 2024-03-04 | End: 2024-03-05

## 2024-03-04 RX ADMIN — MAGNESIUM SULFATE HEPTAHYDRATE 2 G: 40 INJECTION, SOLUTION INTRAVENOUS at 15:46

## 2024-03-04 RX ADMIN — SENNOSIDES, DOCUSATE SODIUM 1 TABLET: 8.6; 5 TABLET ORAL at 17:18

## 2024-03-04 RX ADMIN — ASPIRIN 81 MG CHEWABLE TABLET 81 MG: 81 TABLET CHEWABLE at 09:31

## 2024-03-04 RX ADMIN — BUPROPION HYDROCHLORIDE 300 MG: 150 TABLET, FILM COATED, EXTENDED RELEASE ORAL at 09:31

## 2024-03-04 RX ADMIN — METHOCARBAMOL 250 MG: 500 TABLET ORAL at 21:19

## 2024-03-04 RX ADMIN — INSULIN LISPRO 1 UNITS: 100 INJECTION, SOLUTION INTRAVENOUS; SUBCUTANEOUS at 12:18

## 2024-03-04 RX ADMIN — PREGABALIN 200 MG: 100 CAPSULE ORAL at 17:19

## 2024-03-04 RX ADMIN — CYANOCOBALAMIN TAB 500 MCG 1000 MCG: 500 TAB at 09:31

## 2024-03-04 RX ADMIN — POLYETHYLENE GLYCOL 3350 17 G: 17 POWDER, FOR SOLUTION ORAL at 09:31

## 2024-03-04 RX ADMIN — METHOCARBAMOL 250 MG: 500 TABLET ORAL at 15:46

## 2024-03-04 RX ADMIN — INSULIN LISPRO 1 UNITS: 100 INJECTION, SOLUTION INTRAVENOUS; SUBCUTANEOUS at 21:20

## 2024-03-04 RX ADMIN — ACETAMINOPHEN 975 MG: 325 TABLET, FILM COATED ORAL at 21:20

## 2024-03-04 RX ADMIN — ACETAMINOPHEN 975 MG: 325 TABLET, FILM COATED ORAL at 05:44

## 2024-03-04 RX ADMIN — RIVAROXABAN 2.5 MG: 2.5 TABLET, FILM COATED ORAL at 17:18

## 2024-03-04 RX ADMIN — PREGABALIN 200 MG: 100 CAPSULE ORAL at 09:31

## 2024-03-04 RX ADMIN — NICOTINE 1 PATCH: 14 PATCH, EXTENDED RELEASE TRANSDERMAL at 09:31

## 2024-03-04 RX ADMIN — CLONAZEPAM 1 MG: 1 TABLET ORAL at 17:18

## 2024-03-04 RX ADMIN — PREGABALIN 200 MG: 100 CAPSULE ORAL at 21:19

## 2024-03-04 RX ADMIN — CEFPODOXIME PROXETIL 400 MG: 200 TABLET, FILM COATED ORAL at 09:31

## 2024-03-04 RX ADMIN — RIVAROXABAN 2.5 MG: 2.5 TABLET, FILM COATED ORAL at 09:31

## 2024-03-04 RX ADMIN — METHOCARBAMOL 250 MG: 500 TABLET ORAL at 05:44

## 2024-03-04 RX ADMIN — CEFPODOXIME PROXETIL 400 MG: 200 TABLET, FILM COATED ORAL at 17:18

## 2024-03-04 RX ADMIN — SENNOSIDES, DOCUSATE SODIUM 1 TABLET: 8.6; 5 TABLET ORAL at 09:31

## 2024-03-04 RX ADMIN — PANTOPRAZOLE SODIUM 40 MG: 40 TABLET, DELAYED RELEASE ORAL at 05:44

## 2024-03-04 RX ADMIN — Medication 1 TABLET: at 09:31

## 2024-03-04 RX ADMIN — FLUTICASONE FUROATE AND VILANTEROL TRIFENATATE 1 PUFF: 200; 25 POWDER RESPIRATORY (INHALATION) at 09:30

## 2024-03-04 RX ADMIN — OMEGA-3 FATTY ACIDS CAP 1000 MG 1000 MG: 1000 CAP at 09:31

## 2024-03-04 RX ADMIN — ACETAMINOPHEN 975 MG: 325 TABLET, FILM COATED ORAL at 15:46

## 2024-03-04 NOTE — PLAN OF CARE
Problem: PAIN - ADULT  Goal: Verbalizes/displays adequate comfort level or baseline comfort level  Description: Interventions:  - Encourage patient to monitor pain and request assistance  - Assess pain using appropriate pain scale  - Administer analgesics based on type and severity of pain and evaluate response  - Implement non-pharmacological measures as appropriate and evaluate response  - Consider cultural and social influences on pain and pain management  - Notify physician/advanced practitioner if interventions unsuccessful or patient reports new pain  Outcome: Progressing     Problem: INFECTION - ADULT  Goal: Absence or prevention of progression during hospitalization  Description: INTERVENTIONS:  - Assess and monitor for signs and symptoms of infection  - Monitor lab/diagnostic results  - Monitor all insertion sites, i.e. indwelling lines, tubes, and drains  - Monitor endotracheal if appropriate and nasal secretions for changes in amount and color  - [place] appropriate cooling/warming therapies per order  - Administer medications as ordered  - Instruct and encourage patient and family to use good hand hygiene technique  - Identify and instruct in appropriate isolation precautions for identified infection/condition  Outcome: Progressing  Goal: Absence of fever/infection during neutropenic period  Description: INTERVENTIONS:  - Monitor WBC    Outcome: Progressing     Problem: SAFETY ADULT  Goal: Patient will remain free of falls  Description: INTERVENTIONS:  - Educate patient/family on patient safety including physical limitations  - Instruct patient to call for assistance with activity   - Consult OT/PT to assist with strengthening/mobility   - Keep Call bell within reach  - Keep bed low and locked with side rails adjusted as appropriate  - Keep care items and personal belongings within reach  - Initiate and maintain comfort rounds  - Make Fall Risk Sign visible to staff  - Offer Toileting every 4 Hours,  in advance of need  - Initiate/Maintain 4alarm  - Obtain necessary fall risk management equipment: 4  - Apply yellow socks and bracelet for high fall risk patients  - Consider moving patient to room near nurses station  Outcome: Progressing  Goal: Maintain or return to baseline ADL function  Description: INTERVENTIONS:  -  Assess patient's ability to carry out ADLs; assess patient's baseline for ADL function and identify physical deficits which impact ability to perform ADLs (bathing, care of mouth/teeth, toileting, grooming, dressing, etc.)  - Assess/evaluate cause of self-care deficits   - Assess range of motion  - Assess patient's mobility; develop plan if impaired  - Assess patient's need for assistive devices and provide as appropriate  - Encourage maximum independence but intervene and supervise when necessary  - Involve family in performance of ADLs  - Assess for home care needs following discharge   - Consider OT consult to assist with ADL evaluation and planning for discharge  - Provide patient education as appropriate  Outcome: Progressing  Goal: Maintains/Returns to pre admission functional level  Description: INTERVENTIONS:  - Perform AM-PAC 6 Click Basic Mobility/ Daily Activity assessment daily.  - Set and communicate daily mobility goal to care team and patient/family/caregiver.   - Collaborate with rehabilitation services on mobility goals if consulted  - Perform Range of Motion 4 times a day.  - Reposition patient every 4 hours.  - Dangle patient 4 times a day  - Stand patient 4 times a day  - Ambulate patient 4 times a day  - Out of bed to chair 4 times a day   - Out of bed for meals 4 times a day  - Out of bed for toileting  - Record patient progress and toleration of activity level   Outcome: Progressing     Problem: DISCHARGE PLANNING  Goal: Discharge to home or other facility with appropriate resources  Description: INTERVENTIONS:  - Identify barriers to discharge w/patient and caregiver  -  Arrange for needed discharge resources and transportation as appropriate  - Identify discharge learning needs (meds, wound care, etc.)  - Arrange for interpretive services to assist at discharge as needed  - Refer to Case Management Department for coordinating discharge planning if the patient needs post-hospital services based on physician/advanced practitioner order or complex needs related to functional status, cognitive ability, or social support system  Outcome: Progressing     Problem: Knowledge Deficit  Goal: Patient/family/caregiver demonstrates understanding of disease process, treatment plan, medications, and discharge instructions  Description: Complete learning assessment and assess knowledge base.  Interventions:  - Provide teaching at level of understanding  - Provide teaching via preferred learning methods  Outcome: Progressing     Problem: Prexisting or High Potential for Compromised Skin Integrity  Goal: Skin integrity is maintained or improved  Description: INTERVENTIONS:  - Identify patients at risk for skin breakdown  - Assess and monitor skin integrity  - Assess and monitor nutrition and hydration status  - Monitor labs   - Assess for incontinence   - Turn and reposition patient  - Assist with mobility/ambulation  - Relieve pressure over bony prominences  - Avoid friction and shearing  - Provide appropriate hygiene as needed including keeping skin clean and dry  - Evaluate need for skin moisturizer/barrier cream  - Collaborate with interdisciplinary team   - Patient/family teaching  - Consider wound care consult   Outcome: Progressing     Problem: MOBILITY - ADULT  Goal: Maintain or return to baseline ADL function  Description: INTERVENTIONS:  -  Assess patient's ability to carry out ADLs; assess patient's baseline for ADL function and identify physical deficits which impact ability to perform ADLs (bathing, care of mouth/teeth, toileting, grooming, dressing, etc.)  - Assess/evaluate cause of  self-care deficits   - Assess range of motion  - Assess patient's mobility; develop plan if impaired  - Assess patient's need for assistive devices and provide as appropriate  - Encourage maximum independence but intervene and supervise when necessary  - Involve family in performance of ADLs  - Assess for home care needs following discharge   - Consider OT consult to assist with ADL evaluation and planning for discharge  - Provide patient education as appropriate  Outcome: Progressing  Goal: Maintains/Returns to pre admission functional level  Description: INTERVENTIONS:  - Perform AM-PAC 6 Click Basic Mobility/ Daily Activity assessment daily.  - Set and communicate daily mobility goal to care team and patient/family/caregiver.   - Collaborate with rehabilitation services on mobility goals if consulted  - Perform Range of Motion 4 times a day.  - Reposition patient every 4 hours.  - Dangle patient 4 times a day  - Stand patient 4 times a day  - Ambulate patient 4 times a day  - Out of bed to chair 4 times a day   - Out of bed for meals 4 times a day  - Out of bed for toileting  - Record patient progress and toleration of activity level   Outcome: Progressing

## 2024-03-04 NOTE — PROGRESS NOTES
Patient:  BONIFACIO LEMUS    MRN:  [id number]    Aidin Request ID:  [id number]    Level of care reserved:  Inpatient Rehab Facility    Partner Reserved:  [facility] - ([place]), [place] [id number] [phone]    Clinical needs requested:    Geography searched:  10 miles around [id number]    Start of Service:    Request sent:  10:56am EST on 2/29/2024 by Caren Khanna    Partner reserved:  9:25am EST on 3/4/2024 by Bambi Vazquez    Choice list shared:  9:25am EST on 3/4/2024 by Bambi Vazquez

## 2024-03-04 NOTE — ASSESSMENT & PLAN NOTE
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 First dressing change to be done by podiatry today and they will continue at [facility]

## 2024-03-04 NOTE — PHYSICAL THERAPY NOTE
"   Physical Therapy Treatment Note    Patient's Name: Lona Cedeno  : 24 1046   PT Last Visit   PT Visit Date 24   Note Type   Note Type Treatment   Pain Assessment   Pain Assessment Tool 0-10   Pain Score No Pain   Restrictions/Precautions   Weight Bearing Precautions Per Order Yes   RLE Weight Bearing Per Order (S)  NWB   Braces or Orthoses   (Prevalon boot RLE)   Other Precautions Contact/isolation;WBS;Multiple lines;Fall Risk   General   Chart Reviewed Yes   Response to Previous Treatment Patient with no complaints from previous session.   Family/Caregiver Present No   Subjective   Subjective \"You guys always think I'm putting weight through my foot when I'm just resting it on the ground.\"   Bed Mobility   Supine to Sit 5  Supervision   Additional items Bedrails;Increased time required   Sit to Supine Unable to assess   Additional Comments Pt greeted in supine.   Transfers   Sit to Stand 4  Minimal assistance   Additional items Assist x 1;Increased time required;Verbal cues   Stand to Sit 4  Minimal assistance   Additional items Assist x 1;Increased time required;Verbal cues   Additional Comments RW, verbal cues to maintain NWB RLE during transitions   Ambulation/Elevation   Gait pattern Excessively slow;Short stride;Decreased foot clearance;Poor UE support  (hopping - NWB RLE)   Gait Assistance 3  Moderate assist   Additional items Assist x 1;Verbal cues;Tactile cues   Assistive Device Rolling walker   Distance 6' + 5' (w/ seated rest)   Balance   Static Sitting Normal   Dynamic Sitting Good   Static Standing Fair   Dynamic Standing Poor +   Ambulatory Poor  (RW, NWB RLE)   Endurance Deficit   Endurance Deficit Yes   Endurance Deficit Description weakness, fatigue   Activity Tolerance   Activity Tolerance Patient limited by fatigue   Exercises   Hip Abduction Sitting;20 reps;AROM;Bilateral   Hip Adduction Sitting;20 reps;AROM;Bilateral   Knee AROM Long Arc Quad Sitting;20 " "reps;AROM;Bilateral   Marching Sitting;20 reps;AROM;Bilateral   Assessment   Prognosis Good   Problem List Decreased strength;Decreased range of motion;Decreased endurance;Impaired balance;Decreased mobility;Impaired hearing;Orthopedic restrictions;Decreased skin integrity;Decreased safety awareness   Assessment Pt seen for PT treatment session w/ interventions consisting of bed mobility training, t/f training, gait training (NWB RLE), + LE ther ex instruction. Pt w/ good compliance of NWB RLE w/ t/f, but poor compliance of NWB RLE w/ hopping. RW adjusted to lower height but pt still unable to sufficiently use BUE to assist in hopping in more controlled manner. When cued to maintain NWB RLE pt reports that she is merely \"resting the right foot.\" From a PT perspective continue to recommend rehab upon d/c.   Barriers to Discharge Inaccessible home environment;Decreased caregiver support   Goals   Patient Goals try a knee scooter   PT Treatment Day 5   Plan   Treatment/Interventions Functional transfer training;LE strengthening/ROM;Therapeutic exercise;Endurance training;Patient/family training;Equipment eval/education;Bed mobility;Gait training;Compensatory technique education   Progress Slow progress, decreased activity tolerance   PT Frequency 3-5x/wk   Discharge Recommendation   Rehab Resource Intensity Level, PT I (Maximum Resource Intensity)   Equipment Recommended Wheelchair;Walker   AM-PAC Basic Mobility Inpatient   Turning in Flat Bed Without Bedrails 3   Lying on Back to Sitting on Edge of Flat Bed Without Bedrails 3   Moving Bed to Chair 3   Standing Up From Chair Using Arms 3   Walk in Room 2   Climb 3-5 Stairs With Railing 1   Basic Mobility Inpatient Raw Score 15   Basic Mobility Standardized Score 36.97   Highest Level Of Mobility   JH-HLM Goal 4: Move to chair/commode   JH-HLM Achieved 6: Walk 10 steps or more   Education   Education Provided Mobility training;Home exercise program;Assistive device "   Patient Reinforcement needed   End of Consult   Patient Position at End of Consult Bedside chair;All needs within reach  (BLE elevated, all lines in tact)     Mojgan Washington, PT, DPT

## 2024-03-04 NOTE — CASE MANAGEMENT
Case Management Discharge Planning Note    Patient name Lona Cedeno  Location NW8 [id number]/NW8 [id number] MRN [id number]  : 1945 Date 3/4/2024       Current Admission Date: 2024  Current Admission Diagnosis:Diabetic foot ulcer with osteomyelitis (HCC)   Patient Active Problem List    Diagnosis Date Noted    Sacral wound 2024    Impulsive 2024    Other dietary vitamin B12 deficiency anemia 2024    Leukocytosis 2024    Surgical site infection 2024    Sepsis without acute organ dysfunction (HCC) 2024    At risk for venous thromboembolism (VTE) 2024    At high risk for skin breakdown 2024    Wound of skin 2024    Impaired mobility and activities of daily living 2024    Acute pain due to injury 2024    Hematoma 2024    Anemia 2024    Constipation 2024    Preoperative cardiovascular examination 2024    Diabetic foot ulcer with osteomyelitis (HCC) 2024    Ankle ulcer, right, with fat layer exposed ([facility]) 2023    Age-related osteoporosis without current pathological fracture 2023    Abnormal CT of the chest 10/17/2023    COPD, severity to be determined ([facility]) 10/17/2023    Tobacco use disorder 10/17/2023    PAD (peripheral artery disease) ([facility]) 10/10/2023    Bladder neoplasm 2023    Left renal mass 2023    Lactic acidosis 2023    Hypomagnesemia 2023    Degenerative lumbar disc 2023    Urinary incontinence 2023    GERD without esophagitis 10/16/2019    Essential hypertension 02/15/2019    Anxiety and depression 02/15/2019    Type 2 diabetes mellitus with diabetic polyneuropathy (HCC) 2019      LOS (days): 6  Geometric Mean LOS (GMLOS) (days): 5.4  Days to GMLOS:-0.5     OBJECTIVE:  Risk of Unplanned Readmission Score: 22.73         Current admission status: Inpatient   Preferred [facility]acy:   [id number]  [place]  [facility] [address]  Phone: [phone] Fax: [phone]    [facility] [place] [place] - [address]  [address] [place] [address]  Phone: [phone] Fax: [phone]    Primary Care Provider: Vivek Preston DO    Primary Insurance: BEATRIZ KWAN  Secondary Insurance:     DISCHARGE DETAILS:                                                                                                               Facility Insurance Auth Number: [id number]

## 2024-03-04 NOTE — PLAN OF CARE
"  Problem: PHYSICAL THERAPY ADULT  Goal: Performs mobility at highest level of function for planned discharge setting.  See evaluation for individualized goals.  Description: Treatment/Interventions: Functional transfer training, Therapeutic exercise, Endurance training, Gait training, Bed mobility, Equipment eval/education  Equipment Recommended: Wheelchair, Walker       See flowsheet documentation for full assessment, interventions and recommendations.  Outcome: Not Progressing  Note: Prognosis: Good  Problem List: Decreased strength, Decreased range of motion, Decreased endurance, Impaired balance, Decreased mobility, Impaired hearing, Orthopedic restrictions, Decreased skin integrity, Decreased safety awareness  Assessment: Pt seen for PT treatment session w/ interventions consisting of bed mobility training, t/f training, gait training (NWB RLE), + LE ther ex instruction. Pt w/ good compliance of NWB RLE w/ t/f, but poor compliance of NWB RLE w/ hopping. RW adjusted to lower height but pt still unable to sufficiently use BUE to assist in hopping in more controlled manner. When cued to maintain NWB RLE pt reports that she is merely \"resting the right foot.\" From a PT perspective continue to recommend rehab upon d/c.  Barriers to Discharge: Inaccessible home environment, Decreased caregiver support     Rehab Resource Intensity Level, PT: I (Maximum Resource Intensity)    See flowsheet documentation for full assessment.        "

## 2024-03-04 NOTE — ASSESSMENT & PLAN NOTE
Right posterior ankle ulcer with exposed Achilles tendon s/p wound debridement on 2/4/24  Osteomyelitis of right second toe s/p amputation on 2/4/24  Transferred from [facility] for STSG. S/p STSG application on 2/28/24 by Podiatry  Per podiatry:  Plan remains for first post operative dressing change on Monday, 3/4. Patient is stable from podiatry standpoint to be transferred to [facility], no future surgical plans.

## 2024-03-04 NOTE — PROGRESS NOTES
[facility] - Progress Note  Patient: Lona Cedeno 78 y.o. female   MRN: [id number]  PCP: Vivek Preston,   Unit/Bed#: NW8 876-01 Encounter: [id number]  Date Of Visit: 03/04/24    ASSESSMENT:    Lona Cedeno is a 78 y.o. female with:    Right posterior ankle ulcer with exposed achilles tendon  - Right wound debridement (DOS: 2/4/24)  - Right STSG application (DOS: 2/28/24)  Right medial ankle ulcer, limited to breakdown of skin  - Right STSG application (DOS: 2/28/24)   Osteomyelitis of right second toe  - Right 2nd toe amputation (DOS: 2/4/24)  T2DM  PAD  Tobacco use disorder  Diabetic polyneuropathy    PLAN:    Removed right lower extremity wound VAC today. Right lower extremity surgical sites appear stable with STSG intact to both posterior ankle wound as well as medial ankle wound. No acute clinical signs of infection. Continue to closely monitor surgical sites for integration of STSG.  Patient is stable from podiatry standpoint to be transferred to [facility], no future surgical plans. Podiatry to continue to follow while patient is in [facility] for local wound care to STSG surgical sites.  Continue local wound care of adaptic, wet to dry dressing to right lower extremity STSG surgical sites and maxorb/tegaderm to donor site. Podiatry to do surgical site STSG dressing changes, nursing can perform dressing changes to donor site ONLY. Wound care instructions placed for donor site.  Elevation on green foam wedges or pillows when non-ambulatory.  Rest of care per primary team.    Weight bearing status: Nonweightbearing to right lower extremity    SUBJECTIVE:     The patient was seen, evaluated, and assessed at bedside today. The patient was awake, alert, and in no acute distress. No acute events overnight. The patient reports that she is looking forward to getting the wound vac removed today, denies any pain in her right lower extremity. Patient denies N/V/F/chills/SOB/CP.      OBJECTIVE:     Vitals:   BP  "116/59 (BP Location: Right arm)   Pulse 100   Temp 98 °F (36.7 °C) (Oral)   Resp 17   Ht 5' 1\" (1.549 m)   Wt 69.4 kg (153 lb)   SpO2 99%   BMI 28.91 kg/m²     Temp (24hrs), Av.2 °F (36.8 °C), Min:98 °F (36.7 °C), Max:98.4 °F (36.9 °C)      Physical Exam:     General:  Alert, cooperative, and in no distress.  Lower extremity exam:  Cardiovascular status at baseline.  Neurological status at baseline. No calf tenderness noted.     Lower extremity wound(s) as noted below:  Wound #: 1  Location: right posterior ankle  Length 5.8cm: Width 3cm: Depth 0.1cm:   Deepest Tissue Noted in Base: subcutaneous  Probe to Bone: No  Peripheral Skin Description: Attached  Granulation: 80% Fibrotic Tissue: 20% Necrotic Tissue: 0%   Drainage Amount: minimal, serous  Signs of Infection: No  Staples remain intact, STSG is intact    Wound #: 2  Location: right medial ankle  Length 2.4cm: Width 1cm: Depth 0.1cm:   Deepest Tissue Noted in Base: subcutaneous  Probe to Bone: No  Peripheral Skin Description: Attached  Granulation: 80% Fibrotic Tissue: 20% Necrotic Tissue: 0%   Drainage Amount: minimal, serous  Signs of Infection: No  Staples remain intact, STSG is intact    Wound #: 3  Location: right STSG donor site, medial calf  Length 7cm: Width 4cm: Depth 0.1cm:   Deepest Tissue Noted in Base: dermis  Probe to Bone: No  Peripheral Skin Description: Attached  Granulation: 50% Fibrotic Tissue: 50% Necrotic Tissue: 0%   Drainage Amount: minimal, serous  Signs of Infection: No    Clinical Images 24:            Additional Data:     Labs:    Results from last 7 days   Lab Units 24  0540 24  0550 24  0538   WBC Thousand/uL 9.33   < > 7.60   HEMOGLOBIN g/dL 11.6   < > 11.1*   HEMATOCRIT % 37.9   < > 36.3   PLATELETS Thousands/uL 280   < > 299   NEUTROS PCT %  --   --  53   LYMPHS PCT %  --   --  25   MONOS PCT %  --   --  8   EOS PCT %  --   --  13*    < > = values in this interval not displayed.     Results from " "last 7 days   Lab Units 03/04/24  0540 02/29/24  0538 02/28/24  0340   POTASSIUM mmol/L 4.1   < > 4.1   CHLORIDE mmol/L 101   < > 101   CO2 mmol/L 30   < > 31   BUN mg/dL 21   < > 17   CREATININE mg/dL 0.47*   < > 0.61   CALCIUM mg/dL 9.3   < > 10.1   ALK PHOS U/L  --   --  54   ALT U/L  --   --  4*   AST U/L  --   --  14    < > = values in this interval not displayed.           * I Have Reviewed All Lab Data Listed Above.    Recent Cultures (last 7 days):               Imaging: I have personally reviewed pertinent films in PACS  EKG, Pathology, and Other Studies: I have personally reviewed pertinent reports.      ** Please Note: Portions of the record may have been created with voice recognition software. Occasional wrong word or \"sound a like\" substitutions may have occurred due to the inherent limitations of voice recognition software. Read the chart carefully and recognize, using context, where substitutions have occurred. **      "

## 2024-03-04 NOTE — CASE MANAGEMENT
[facility] has received APPROVED authorization.  Insurance:   [employer]   Called in by Rep:sruthi ROCHA#  [phone]  Authorization received for: SNF  Facility: [facility]   Authorization #[id number]  Start of Care:3/4  Next Review Date:2 business days   Continued Stay Care Coordinator:klaudia ROCHA#:   Submit next review to: [phone]     Care Manager notified:Bambi Vazquez

## 2024-03-04 NOTE — CASE MANAGEMENT
Case Management Discharge Planning Note    Patient name Lona Cedeno  Location NW8 876/NW8 876-01 MRN [id number]  : 1945 Date 3/4/2024       Current Admission Date: 2024  Current Admission Diagnosis:Diabetic foot ulcer with osteomyelitis (HCC)   Patient Active Problem List    Diagnosis Date Noted    Sacral wound 2024    Impulsive 2024    Other dietary vitamin B12 deficiency anemia 2024    Leukocytosis 2024    Surgical site infection 2024    Sepsis without acute organ dysfunction (HCC) 2024    At risk for venous thromboembolism (VTE) 2024    At high risk for skin breakdown 2024    Wound of skin 2024    Impaired mobility and activities of daily living 2024    Acute pain due to injury 2024    Hematoma 2024    Anemia 2024    Constipation 2024    Preoperative cardiovascular examination 2024    Diabetic foot ulcer with osteomyelitis (HCC) 2024    Ankle ulcer, right, with fat layer exposed ([facility]) 2023    Age-related osteoporosis without current pathological fracture 2023    Abnormal CT of the chest 10/17/2023    COPD, severity to be determined ([facility]) 10/17/2023    Tobacco use disorder 10/17/2023    PAD (peripheral artery disease) ([facility]) 10/10/2023    Bladder neoplasm 2023    Left renal mass 2023    Lactic acidosis 2023    Hypomagnesemia 2023    Degenerative lumbar disc 2023    Urinary incontinence 2023    GERD without esophagitis 10/16/2019    Essential hypertension 02/15/2019    Anxiety and depression 02/15/2019    Type 2 diabetes mellitus with diabetic polyneuropathy (HCC) 2019      LOS (days): 6  Geometric Mean LOS (GMLOS) (days): 5.4  Days to GMLOS:-0.5     OBJECTIVE:  Risk of Unplanned Readmission Score: 22.73         Current admission status: Inpatient   Preferred Pharmacy:   [id number]  [place]  [facility] [address]  Phone: [phone] Fax: [phone]    [facility] [place], [place] - [address]  [address]  [place] [address]  Phone: [phone] Fax: [phone]    Primary Care Provider: Vivek Preston DO    Primary Insurance: [employer]  Secondary Insurance:     DISCHARGE DETAILS:    Other Referral/Resources/Interventions Provided:  Referral Comments: [facility] has bed available for tomorrow. NPI information submitted to  discharge support team for submission of auth.

## 2024-03-04 NOTE — ASSESSMENT & PLAN NOTE
Lab Results   Component Value Date    HGBA1C 6.0 (H) 02/28/2024       Recent Labs     03/03/24  1710 03/03/24 2056 03/04/24  0814 03/04/24  1056   POCGLU 110 160* 137 196*         Blood Sugar Average: Last 72 hrs:  (P) [id number]  Continue SSI  Accu-Cheks reviewed  Continue Lyrica

## 2024-03-04 NOTE — CASE MANAGEMENT
[facility] received request for authorization from Care Manager.  Authorization request for: Acute Rehab  [facility]  NPI:[id number]  Facility MD:  Ashley Depadua    NPI: [id number]  Authorization initiated by contacting insurance:  [employer]   Via: Fax  Clinicals submitted via: fax # [phone]      Care Manager notified: Bambi Vazquez

## 2024-03-04 NOTE — PROGRESS NOTES
PHYSICAL MEDICINE AND REHABILITATION   PREADMISSION ASSESSMENT     Projected IGC and Rehabilitation Diagnoses:  Impairment of mobility, safety and Activities of Daily Living (ADLs) due to Other Disabling Impairments:  13  Other Disabling Impairments  Etiologic: Diabetic foot ulcer with osteomyelitis s/p fem bypass, right foot wound and achilles debridement with 2nd toe partial amputation and now s/p STSG   Date of Onset: 2/26/24   Date of surgery: 1/31, 2/4, 2/14, 2/28    PATIENT INFORMATION  Name: Lona Cedeno Phone #: [phone] (home)   Address: [address] [place] [address] [address] [place] [id number]  YOB: 1945 Age: 78 y.o. #   Marital Status: /Civil Union  Ethnicity: White  Employment Status: retired  Extended Emergency Contact Information  Primary Emergency Contact: Carmelo Cedeno   [facility]  Home Phone: [phone]  Relation: Spouse  Secondary Emergency Contact: Ariela Feng  Home Phone: [phone]  Mobile Phone: [phone]  Relation: Daughter  Advance Directive: Level 1 Full Code (has ACP docs)    INSURANCE/COVERAGE:     Primary Payor: [employer] REP / Plan: [employer]  REP / Product Type: Medicare PPO /   Secondary Payer: Self Pay   Payer Contact:  Payer Contact:   Contact Phone:  Contact Phone:     [facility] has received APPROVED authorization.  Insurance:   [employer]   Called in by Rep:sruhti ROCHA#  [phone]  Authorization received for: SNF  Facility: [facility]   Authorization #[id number]  Start of Care:3/4  Next Review Date:2 business days   Continued Stay Care Coordinator:klaudia ROCHA#:   Submit next review to: [phone]             MEDICARE #: [id number]   Medical Record #: [id number]    REFERRAL SOURCE:   Referring provider: Ofe Wood MD  Referring facility: [facility]  Room: [place] [address]/[facility] 876-  PCP: Vivek Preston DO PCP phone number: [phone]    MEDICAL  INFORMATION  HPI: Pt is a 78 year old female with a PMH of PAD, COPD, neuropathy, HTN, HLD, tobacco abuse, anxiety, depression, bladder neoplasm and diabetes who presented to the [facility] on 1/23/2024 from podiatry office with diabetic ulcer of the right toe and right ankle wound with achilles tendon exposed. She was found to have osteomyelitis of the right foot and was transferred to [place] for possible bypass. Pt was recommended to undergo extra-anatomic bypass right axillary to femoral bypass and right BKA but patient elected to try further interventions to salvage her right foot and plastics was consulted. She underwent fem bypass on 1/31 and right foot wound and achilles debridement with 2nd toe partial amputation and VAC placement. She will likely undergo STSG for skin graft substitute with plastics. Her hospital course was complicated by right chest wall hematoma and she underwent evacuation with vascular on 2/4/2024. Podiatry following: VAC changed on 2/8. Plan to continue negative pressure wound VAC therapy at this time with next change on 2/10/2024.  Discussed with podiatry and patient will not need further OR interventions during this admission.  She will be discharged on wound VAC with follow-up with wound care and evaluation in the outpatient setting for possible split-thickness skin graft down the line. Pt was medically stable and tranfered to [facility] on 2/9.    While on [facility] she developed a groin surgical site infection with fever and leukocytosis and was started on cefepime and vancomycin IV and transferred to acute inpatient unit on 2/13 for further evaluation and therapy.  Patient had encephalopathy and met sepsis criteria.  On 2/14 she underwent right groin wound debridement with washout and application of wound VAC by vascular surgery.  Apparently the graft was not grossly exposed.  SARS Cov 2 PCR panel negative and blood cultures NGTD, Wound culture 2/14- (+)  Klebsiella and Proteus growing, Anaerobic culture 2/14/2024-pending, Urine culture-prelim> 100,000 enterobacter cloacae noted to be resistant to cefepime- likely secondary to colonization/asymptomatic bacteruria.  Pt is s/p Right Groin washout VAC change 2/19. Wound vac changed at bedside on 2/21. Right foot wound, 2nd digit surgical site and posterior ankle wound noted to be stable with no concern for acute infection at this time. Posterior ankle wound noted to be granulating well with continued vac therapy. Will continue with [facility] wound vac changes. The next VAC dressing change will be planned for 2/26/24. The VAC paperwork was completed and give to the case management staff to arrange home VAC therapy. Discussed with patient possible need for STSG at a later date. Patient was amenable to this plan. Pt was admitted to [facility] on 2/23.     While on the [facility]- Contacted by medicine team who had been reached by the podiatry team who stated that they would like to take her back to the OR for split thickness skin grafting.  Pt is s/p Right STSG application on 2/28. On 3/4 Removed right lower extremity wound VAC. Right lower extremity surgical sites appear stable with STSG intact to both posterior ankle wound as well as medial ankle wound. No acute clinical signs of infection. Patient is stable from podiatry standpoint to be transferred to [facility], no future surgical plans. Podiatry to continue to follow while patient is in [facility] for local wound care to STSG surgical sites.    Surgical site infection- S/p removal of vac by vascular surgery on 2/28. ID recommended continuation of antibiotics through 3/22/2024 due to proximity of wound infection to the bypass graft - on Cefpodoxime 400 mg twice daily      PT and OT have been consulted and are recommending post-acute rehab services.   Patient's case has been reviewed with [facility] medical director, patient meets medical criteria for acute rehab and has demonstrated the ability to  tolerate three or more hours of therapy per day. Patient is medically stable and ready for discharge to [facility].        Past Medical History:   Past Surgical History:   Allergies:     Past Medical History:   Diagnosis Date    Anxiety     Closed fracture of coccyx (HCC) 2023    Diabetes mellitus (HCC)     GERD (gastroesophageal reflux disease)     Hyperlipidemia     Hypertension     IBS (irritable bowel syndrome)     Shoulder fracture     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;  Surgeon: Cristian Child MD;  Location: [facility];  Service: Urology    EVACUATION OF HEMATOMA Right 2024    Procedure: EVACUATION/ DRAINAGE CHEST WALL  HEMATOMA;  Surgeon: Seth Hoyos MD;  Location: [facility];  Service: Vascular    FL RETROGRADE PYELOGRAM  2023    HERNIA REPAIR      umbilicus w/ mesh    PA BYPASS W/VEIN AXILLARY-FEMORAL Right 2024    Procedure: BYPASS AXILLO-FEMORAL, RIGHT WITH 8MM RINGED PTFE GRAFT;  Surgeon: Seth Hoyos MD;  [facility];  Service: Vascular    SPLIT THICKNESS SKIN GRAFT Right 2024    Procedure: SKIN GRAFT SPLIT THICKNESS (STSG)  EXTREMITY, Wound vac dressing change;  Surgeon: Rigo Yeboah DPM;  [facility];  Service: Podiatry    WOUND DEBRIDEMENT Right 2024    Procedure: RIGHT WOUND AND ACHILLES DEBRIDEMENT FOOT/TOE (WASH OUT); PARTIAL AMPUTATION DISTAL 2ND TOE;  Surgeon: Aaron Montero DPM;  [facility];  Service: Podiatry    WOUND DEBRIDEMENT  Right 2/14/2024    Procedure: DEBRIDEMENT RIGHT GROIN  WOUND AND WASHOUT, APPLICATION OF WOUND VAC;  Surgeon: Suly Muro DO;  [facility];  Service: Vascular    WOUND DEBRIDEMENT Right 2/19/2024    Procedure: DEBRIDEMENT LOWER EXTREMITY, washout;  Surgeon: Suly Muro DO;  [facility];  Service: Vascular     Allergies   Allergen Reactions    Paroxetine Other (See Comments)     Became suicidal    Adhesive [Medical Tape] Itching and Blisters    Carafate [Sucralfate] Other (See Comments)     Mouth sores, tongue sore    Sulfa Antibiotics Hives and Rash    Sulfamethoxazole-Trimethoprim Hives         Medical/functional conditions requiring inpatient rehabilitation: Diabetic right foot ulcer with osteomyelitis s/p fem bypass, right foot wound and achilles debridement with 2nd toe partial amputation and VAC placement , Impaired mobility and self care, Decreased strength/endurance    Risk for medical/clinical complications: Risk for falls, Risk for skin breakdown secondary to decreased mobility, Risk for uncontrolled pain, Risk for infection or dehiscence    Comorbidities:  Right chest wall hematoma s/p evacuation   Constipation, agree with starting miralax in addition for senna  Surgical site infection  Sepsis without acute organ dysfunction   Diabetic foot ulcer with osteomyelitis   Tobacco use  COPD  PAD  Acute pain due to surgery  HTN  Anxiety/depression   Diabetes type 2   Other dietary vitamin B12 deficiency anemia   DVT ppx: SCD     Surgeries in the last 100 days:   right axillofemoral bypass with vascular surgery on 1/31/24   Right chest wall hematoma evacuation, right foot wound and achilles debridement with 2nd toe partial amputation, VAC placement  on 2/4  right groin wound debridement with washout and application of wound VAC 2/14  S/P Right Groin washout VAC change 2/19.  S/p STSG on 2/28    CURRENT VITAL SIGNS:   Temp:  [98 °F (36.7 °C)-98.5 °F (36.9 °C)] 98 °F (36.7 °C)  HR:   [68-93] 68  Resp:  [15] 15  BP: (111-144)/(53-65) 115/53   Intake/Output Summary (Last 24 hours) at 3/5/2024 1022  Last data filed at 3/5/2024 0401  Gross per 24 hour   Intake 240 ml   Output 1300 ml   Net -1060 ml        LABORATORY RESULTS:      Lab Results   Component Value Date    HGB 11.6 03/04/2024    HGB 16.8 (H) 05/21/2018    HCT 37.9 03/04/2024    HCT 50.1 (H) 05/21/2018    WBC 9.33 03/04/2024    WBC 9.0 05/21/2018     Lab Results   Component Value Date    BUN 21 03/04/2024    BUN 15 05/21/2018     05/21/2018    K 4.1 03/04/2024    K 4.0 05/21/2018     03/04/2024    CL 95 (L) 05/21/2018    GLUCOSE 143 (H) 01/31/2024    CREATININE 0.47 (L) 03/04/2024    CREATININE 0.62 05/21/2018     Lab Results   Component Value Date    PROTIME 14.7 (H) 02/14/2024    INR 1.16 02/14/2024        DIAGNOSTIC STUDIES:  No results found.    PRECAUTIONS/SPECIAL NEEDS:  Weight Bearing Precautions:  NWB R LE and prevlon boot and WBAT L LE, Anticoagulation:  aspirin 81 mg orally every day, Edema Management, Safety Concerns, Pain Management, Dietary Restrictions: Cristian/CHO, Hard of Hearing, and Language Preference: English, Fall precautions     MEDICATIONS:     Current Facility-Administered Medications:     acetaminophen (TYLENOL) tablet 975 mg, 975 mg, Oral, Q8H JILLIAN, Mary Whyte, DPM, 975 mg at 03/05/24 0557    albuterol (PROVENTIL HFA,VENTOLIN HFA) inhaler 2 puff, 2 puff, Inhalation, Q6H PRN, Mary Whyte DPOLGA    aspirin chewable tablet 81 mg, 81 mg, Oral, Daily, Mary Whyte, DPM, 81 mg at 03/05/24 0850    buPROPion (WELLBUTRIN XL) 24 hr tablet 300 mg, 300 mg, Oral, Daily, Mary Whyte DPM, 300 mg at 03/05/24 0850    cefpodoxime (VANTIN) tablet 400 mg, 400 mg, Oral, BID With Meals, Mary Whyte DPM, 400 mg at 03/05/24 0851    clonazePAM (KlonoPIN) tablet 1 mg, 1 mg, Oral, QPM, Mary Whyte DPM, 1 mg at 03/04/24 1718    cyanocobalamin (VITAMIN B-12) tablet 1,000 mcg,  1,000 mcg, Oral, Daily, Mary Whyte DPM, 1,000 mcg at 03/05/24 0850    fish oil capsule 1,000 mg, 1,000 mg, Oral, Daily, Mary Whyte, DPM, 1,000 mg at 03/05/24 0850    fluticasone-vilanterol 200-25 mcg/actuation 1 puff, 1 puff, Inhalation, Daily, Mary Whyte DPM, 1 puff at 03/05/24 0851    insulin lispro (HumALOG/ADMELOG) 100 units/mL subcutaneous injection 1-5 Units, 1-5 Units, Subcutaneous, TID AC, 1 Units at 03/04/24 1218 **AND** Fingerstick Glucose (POCT), , , TID AC, Mary Whyte, DPOLGA    insulin lispro (HumALOG/ADMELOG) 100 units/mL subcutaneous injection 1-5 Units, 1-5 Units, Subcutaneous, HS, Mary Whyte DPM, 1 Units at 03/04/24 2120    methocarbamol (ROBAXIN) tablet 250 mg, 250 mg, Oral, Q8H JILLIAN, Mary Whyte DPM, 250 mg at 03/05/24 0557    multivitamin-minerals (CENTRUM) tablet 1 tablet, 1 tablet, Oral, Daily, Mary Whyte DPM, 1 tablet at 03/05/24 0850    nicotine (NICODERM CQ) 14 mg/24hr TD 24 hr patch 1 patch, 1 patch, Transdermal, Daily, Mary Whyte DPM, 1 patch at 03/05/24 0849    oxyCODONE (ROXICODONE) split tablet 2.5 mg, 2.5 mg, Oral, Q4H PRN **OR** oxyCODONE (ROXICODONE) IR tablet 5 mg, 5 mg, Oral, Q4H PRN, Mary Whyte DPM, 5 mg at 02/29/24 2227    pantoprazole (PROTONIX) EC tablet 40 mg, 40 mg, Oral, Early Morning, Mary Whyte DPM, 40 mg at 03/05/24 0557    polyethylene glycol (MIRALAX) packet 17 g, 17 g, Oral, Daily, Mary Whyte DPM, 17 g at 03/04/24 0931    pregabalin (LYRICA) capsule 200 mg, 200 mg, Oral, TID, Mary Whyte DPM, 200 mg at 03/05/24 0850    rivaroxaban (XARELTO) tablet 2.5 mg, 2.5 mg, Oral, BID, Kavya Lang MD, 2.5 mg at 03/05/24 0851    senna-docusate sodium (SENOKOT S) 8.6-50 mg per tablet 1 tablet, 1 tablet, Oral, BID, Mary Whyte DPM, 1 tablet at 03/05/24 0850    SKIN INTEGRITY:   Wound 01/09/24 Arterial Ulcer Ankle Medial;Right  Wound  "01/09/24 Arterial Ulcer Ankle Lateral;Right  Wound 01/09/24 Arterial Ulcer Ankle Right;Posterior  Wound 01/09/24 Arterial Ulcer Ankle Left;Lateral  Wound 01/23/24 Diabetic Ulcer Toe D3, third Anterior;Right  Wound 01/23/24 Pressure Injury Toe D5, fifth Anterior;Right  Wound 02/04/24 Toe D2, second Right  Wound 02/04/24 Chest Right  Wound 02/14/24 Groin Right  Wound 02/17/24 MASD Sacrum  Wound 02/18/24 Finger D4, ring Anterior;Left  Wound 02/18/24 Finger D5, small Anterior;Left  Wound 02/23/24 Pressure Injury Coccyx Mid  Wound 02/28/24 Ankle Right    PRIOR LEVEL OF FUNCTION:  She lives in a(n) single family home  Lona Cedeno is  and lives with their family.  Self Care: Independent, Indoor Mobility: Independent, Stairs (in/outdoor): Independent, and Cognition: Independent    FALLS IN THE LAST 6 MONTHS: 0    HOME ENVIRONMENT:  The living area: can live on one level  There are 5 steps to enter the home.    The patient will not have 24 hour supervision/physical assistance available upon discharge.    PREVIOUS DME:  Equipment in home (previous DME): Shower Chair and Grab Bars    FUNCTIONAL STATUS:  Physical Therapy Occupational Therapy Speech Therapy   03/04/24 1046    PT Last Visit   PT Visit Date 03/04/24   Note Type   Note Type Treatment   Pain Assessment   Pain Assessment Tool 0-10   Pain Score No Pain   Restrictions/Precautions   Weight Bearing Precautions Per Order Yes   RLE Weight Bearing Per Order (S)  NWB   Braces or Orthoses    (Prevalon boot RLE)   Other Precautions Contact/isolation;WBS;Multiple lines;Fall Risk   General   Chart Reviewed Yes   Response to Previous Treatment Patient with no complaints from previous session.   Family/Caregiver Present No   Subjective   Subjective \"You guys always think I'm putting weight through my foot when I'm just resting it on the ground.\"   Bed Mobility   Supine to Sit 5  Supervision   Additional items Bedrails;Increased time required   Sit to Supine Unable to " "assess   Additional Comments Pt greeted in supine.   Transfers   Sit to Stand 4  Minimal assistance   Additional items Assist x 1;Increased time required;Verbal cues   Stand to Sit 4  Minimal assistance   Additional items Assist x 1;Increased time required;Verbal cues   Additional Comments RW, verbal cues to maintain NWB RLE during transitions   Ambulation/Elevation   Gait pattern Excessively slow;Short stride;Decreased foot clearance;Poor UE support  (hopping - NWB RLE)   Gait Assistance 3  Moderate assist   Additional items Assist x 1;Verbal cues;Tactile cues   Assistive Device Rolling walker   Distance 6' + 5' (w/ seated rest)   Balance   Static Sitting Normal   Dynamic Sitting Good   Static Standing Fair   Dynamic Standing Poor +   Ambulatory Poor  (RW, NWB RLE)   Endurance Deficit   Endurance Deficit Yes   Endurance Deficit Description weakness, fatigue   Activity Tolerance   Activity Tolerance Patient limited by fatigue   Exercises   Hip Abduction Sitting;20 reps;AROM;Bilateral   Hip Adduction Sitting;20 reps;AROM;Bilateral   Knee AROM Long Arc Quad Sitting;20 reps;AROM;Bilateral   Marching Sitting;20 reps;AROM;Bilateral   Assessment   Prognosis Good   Problem List Decreased strength;Decreased range of motion;Decreased endurance;Impaired balance;Decreased mobility;Impaired hearing;Orthopedic restrictions;Decreased skin integrity;Decreased safety awareness   Assessment Pt seen for PT treatment session w/ interventions consisting of bed mobility training, t/f training, gait training (NWB RLE), + LE ther ex instruction. Pt w/ good compliance of NWB RLE w/ t/f, but poor compliance of NWB RLE w/ hopping. RW adjusted to lower height but pt still unable to sufficiently use BUE to assist in hopping in more controlled manner. When cued to maintain NWB RLE pt reports that she is merely \"resting the right foot.\" From a PT perspective continue to recommend rehab upon d/c.    03/03/24 1143    OT Last Visit   OT Visit Date " "03/03/24   Note Type   Note Type Treatment for insurance authorization   Pain Assessment   Pain Assessment Tool 0-10   Pain Score No Pain   Restrictions/Precautions   Weight Bearing Precautions Per Order Yes   RLE Weight Bearing Per Order NWB   Braces or Orthoses    (R prevalon boot, R wound vac x2)   Lifestyle   Autonomy Pt reports (I) with ADLs, IADLs and functional mobility with rollator. Pt +   Reciprocal Relationships Family   Service to Others retired   Intrinsic Gratification Pt enjoys reading   ADL   Where Assessed Commode   Grooming Assistance 5  Supervision/Setup   UB Bathing Assistance 5  Supervision/Setup   LB Bathing Assistance 4  Minimal Assistance   UB Dressing Assistance 4  Minimal Assistance   UB Dressing Deficit Thread RUE;Thread LUE;Pull around back   LB Dressing Assistance 2  Maximal Assistance   Toileting Assistance  4  Minimal Assistance   Toileting Deficit Clothing management up;Clothing management down;Perineal hygiene   Bed Mobility   Supine to Sit 5  Supervision   Additional items Assist x 1   Transfers   Sit to Stand 4  Minimal assistance   Additional items Assist x 1   Stand to Sit 4  Minimal assistance   Additional items Assist x 1   Stand pivot 4  Minimal assistance   Additional items Assist x 1   Subjective   Subjective pt stated \" I was justing waiting for the bed pan\"   Cognition   Overall Cognitive Status WFL   Arousal/Participation Alert;Responsive;Cooperative   Attention Within functional limits   Orientation Level Oriented X4   Memory Within functional limits   Following Commands Follows all commands and directions without difficulty   Activity Tolerance   Activity Tolerance Patient tolerated treatment well   Assessment   Assessment Pt seen for Occupational Therapy session with focus on activity tolerance, bed mob, functional transfers/mob, sitting balance and tolerance and standing tolerance and balance for pt engagement in UB/LB self-care tasks  and toileting/toilet " transfers. Pt cleared by PAULINA/ Ricardo for pt participated in OT session. Pt presented supine/HOB raised pt awake/alert and agreeable to participate in therapy following pt identifiers confirmed.  Pt reported her therapy goal to  go home to her cats and her dog.  Pt  reported need to for a bed pan however agreeable to use bedside commode when offered. Pt required assist for commode transfers 2*decreased coordination and balance. Pt able to void.   Pt will benefit from post acute rehab service to continue to address these above noted pt deficit which currently impair pt ADL and functional mob. Pt agreeable to sit up to the bedside chair post session and all needs within pt reach         CARE SCORES:  Self Care:  Eatin: Independent  Oral hygiene: 04: Supervision or touching  assistance  Toilet hygiene: 03: Partial/moderate assistance  Shower/bathing self: 03: Partial/moderate assistance  Upper body dressin: Partial/moderate assistance  Lower body dressin: Substantial/maximal assistance  Putting on/taking off footwear: 02: Substantial/maximal assistance  Transfers:  Roll left and right: 04: Supervision or touching  assistance  Sit to lyin: Partial/moderate assistance  Lying to sitting on side of bed: 04: Supervision or touching  assistance  Sit to stand: 03: Partial/moderate assistance   Chair/bed to chair transfer: 03: Partial/moderate assistance   Toilet transfer: 03: Partial/moderate assistance   Mobility: (Mod A with RW 6'+5')  Walk 10 ft: 88: Not attempted due to medical conditions or safety concerns  Walk 50 ft with two turns: 88: Not attempted due to medical conditions or safety concerns  Walk 150ft: 88: Not attempted due to medical conditions or safety concerns    CURRENT GAP IN FUNCTION  Prior to Admission: Functional Status: Patient was independent with mobility/ambulation, transfers, ADL's, IADL's.    Expected functional outcomes: It is expected that with skilled acute rehabilitation services  the patient will progress to Independent for self care and Independent for mobility     Estimated length of stay: 10 to 14 days    Anticipated Post-Discharge Disposition/Treatment  Disposition: Return to previous home/apartment.  Outpatient Services: Physical Therapy (PT) and Occupational Therapy (OT)    BARRIERS TO DISCHARGE  Weakness, Pain, Balance Difficulty, Fatigue, Home Accessibility, Caregiver Accessibility, and Equipment Needs    INTERVENTIONS FOR DISCHARGE  Adaptive equipment, Patient/Family/Caregiver Education, Community Resources, Support Group, Arrange DME needs, Therapy exercises, and Energy conservation education     REQUIRED THERAPY:  Patient will require PT and OT 90 minutes each per day, five days per week to achieve rehab goals.     REQUIRED FUNCTIONAL AND MEDICAL MANAGEMENT FOR INPATIENT REHABILITATION:  Skin:  Monitor skin for breakdown, Pain Management: Overall pain is moderately controlled, Deep Vein Thrombosis (DVT) Prophylaxis:  Per MD orders , further internal medicine management of additional medical conditions while on [facility]C, PT/OT intervention, patient/family education and training, and any needed consults PRN.    RECOMMENDED LEVEL OF CARE:    Pt is a 78 y.o. female with a PMH of COPD, DM type 2, HTN, tobacco abuse, anxiety/depression, PAD and bladder neoplasm who presented 1/23/24 to [facility] with a diabetic ulcer of the toe and right ankle wound with exposure of the Achilles tendon. During her stay, she had debridement of the wound on 1/9/24. Work-up during that time revealed significant aortoiliac disease and inadequate healing potential. She was initially started on abx which were stopped after the pt was seen by ID as she did not have an active infection. She was transferred to [facility] for revascularization. She underwent a right axillo-femoral bypass on 1/31/24. On 2/4/24 she went to the OR for Achilles debridement foot/toe, partial amputation distal 2nd toe, VAC placement and  evacuation/drainage of right chest wall hematoma with Podiatry and Vascular surgery. The chest hematoma/seroma did re accumulate and further surgery was not thought to be helpful and therefore did not require any further surgical intervention. She was discharged to the [facility] on 2/9/24 with a wound VAC and a plan to follow-up with wound care in the outpt setting for possible split thickness skin graft. She was admitted to [facility] on 2/9. While on [facility] she developed a groin surgical site infection with fever and leukocytosis and was started on cefepime and vancomycin IV and transferred to acute inpatient unit on 2/13 for further evaluation. She stayed on acute from 2/13/2024 to 2/23/2024 for surgical site infection of her right groin.  She then admitted back to [facility] on 2/23 for continued therapy. While on the [facility]- Contacted by medicine team who had been reached by the podiatry team who stated that they would like to take her back to the OR for split thickness skin grafting.  Pt is s/p Right STSG application on 2/28. On 3/4 Removed right lower extremity wound VAC. Patient is stable from podiatry standpoint to be transferred to [facility], no future surgical plans.   Pt was independent PTA with transfers, amb, and ADLs. Pt lives with spouse and daughter in a [place] with 5 PATO. Pt is currently functioning below baseline needing Max A for ADLs and Mod A for transfers/amb. Pt would benefit from [facility] admission to have close medical management while participating in 3 hours of therapy per day that will include physical and occupational therapy. Physical and occupational therapists will address functional mobility deficits and assist patient in improving their strength, endurance, ROM, and self care. Pt will have 24/7 nursing care to monitor routine vitals, I/Os, skin integrity, and overall condition. The rehab nursing staff will follow therapy recommendations to have 24 hour follow through during non-therapy hours. PM&R to maximize  function and provide medical oversight. The MD will monitor patient co-morbidities while on the unit as well as order any additional tests, labs, and consults needed. The [facility] specialized interdisciplinary team will meet weekly to discuss patient overall medical status and rehab goals in preparation for D/C home. Inpatient acute rehab is recommended for patient to maximize overall strength, endurance, self care, and mobility for a safe and timely transition back home.

## 2024-03-04 NOTE — PROGRESS NOTES
[facility]  Progress Note  Name: Lona Cedeno I  MRN: [id number]  Unit/Bed#: NW8 876-01 I Date of Admission: 2/27/2024   Date of Service: 3/4/2024 I Hospital Day: 6    Assessment/Plan   * Diabetic foot ulcer with osteomyelitis (HCC)  Assessment & Plan  Right posterior ankle ulcer with exposed Achilles tendon s/p wound debridement on 2/4/24  Osteomyelitis of right second toe s/p amputation on 2/4/24  Transferred from [facility] for STSG. S/p STSG application on 2/28/24 by Podiatry  Per podiatry:  Plan remains for first post operative dressing change on Monday, 3/4. Patient is stable from podiatry standpoint to be transferred to [facility], no future surgical plans.        Sacral wound  Assessment & Plan  Noted to have unstageable wound on the sacrum on initial admission  Continue wound care    Surgical site infection  Assessment & Plan  Hospitalized from 2/13/24 to 2/23/24 for right groin surgical wound infection from previous RLE bypass  S/p debridement on 2/14/2024 and washout on 2/19/2024  Wound cultures - Klebsiella and Proteus  S/p removal of vac by vascular surgery on 2/28, they have since signed off   ID recommended continuation of antibiotics through 3/22/2024 due to proximity of wound infection to the bypass graft - on Cefpodoxime 400 mg twice daily  S/p STSG 2/28/24 with wound vac by podiatry. First dressing change to be done by podiatry today and they will continue at [facility]    Tobacco use disorder  Assessment & Plan  Continue nicotine patch  Encourage Smoking cessation     COPD, severity to be determined ([facility])  Assessment & Plan  Not in exacerbation  Home Advair replaced by equivalent formulary Breo  Continue albuterol as needed    PAD (peripheral artery disease) ([facility])  Assessment & Plan  Continue ASA, statin, Xarelto  Vascular managing right groin wound. Right groin wound vac removed as above, and recs for wound care     Hypomagnesemia  Assessment & Plan  Mag 1.7  today  Repleted  Repeat in the morning    GERD without esophagitis  Assessment & Plan  Continue PPI    Anxiety and depression  Assessment & Plan  Continue Wellbutrin  mg daily and Clonazepam 1 mg at bedtime    Type 2 diabetes mellitus with diabetic polyneuropathy (HCC)  Assessment & Plan  Lab Results   Component Value Date    HGBA1C 6.0 (H) 02/28/2024       Recent Labs     03/03/24  1710 03/03/24  2056 03/04/24  0814 03/04/24  1056   POCGLU 110 160* 137 196*         Blood Sugar Average: Last 72 hrs:  (P) [id number]  Continue SSI  Accu-Cheks reviewed  Continue Lyrica               VTE Pharmacologic Prophylaxis: VTE Score: 4 Moderate Risk (Score 3-4) - Pharmacological DVT Prophylaxis Ordered: rivaroxaban (Xarelto).    Mobility:   Basic Mobility Inpatient Raw Score: 15  JH-HLM Goal: 4: Move to chair/commode  JH-HLM Achieved: 4: Move to chair/commode  HLM Goal achieved. Continue to encourage appropriate mobility.    Patient Centered Rounds: I performed bedside rounds with nursing staff today.   Discussions with Specialists or Other Care Team Provider:     Education and Discussions with Family / Patient: Attempted to update  () via phone. Left voicemail.     Total Time Spent on Date of Encounter in care of patient: 35 mins. This time was spent on one or more of the following: performing physical exam; counseling and coordination of care; obtaining or reviewing history; documenting in the medical record; reviewing/ordering tests, medications or procedures; communicating with other healthcare professionals and discussing with patient's family/caregivers.    Current Length of Stay: 6 day(s)  Current Patient Status: Inpatient   Certification Statement: The patient will continue to require additional inpatient hospital stay due to insurance Auth, dispo planning, podiatry dressing change  Discharge Plan: Anticipate discharge in 24-48 hrs to rehab facility.    Code Status: Level  1 - Full Code    Subjective:   No events overnight.  Patient reports feeling well.  Tolerating oral intake.  No pain    Objective:     Vitals:   Temp (24hrs), Av.2 °F (36.8 °C), Min:98 °F (36.7 °C), Max:98.4 °F (36.9 °C)    Temp:  [98 °F (36.7 °C)-98.4 °F (36.9 °C)] 98 °F (36.7 °C)  HR:  [] 100  Resp:  [14-17] 17  BP: (105-127)/(55-59) 116/59  SpO2:  [90 %-99 %] 99 %  Body mass index is 28.91 kg/m².     Input and Output Summary (last 24 hours):     Intake/Output Summary (Last 24 hours) at 3/4/2024 1351  Last data filed at 3/4/2024 1300  Gross per 24 hour   Intake 720 ml   Output 1600 ml   Net -880 ml       Physical Exam:   Physical Exam  Vitals and nursing note reviewed.   Constitutional:       General: She is not in acute distress.     Appearance: She is well-developed.   HENT:      Head: Normocephalic and atraumatic.   Eyes:      Conjunctiva/sclera: Conjunctivae normal.   Cardiovascular:      Rate and Rhythm: Normal rate and regular rhythm.   Pulmonary:      Effort: Pulmonary effort is normal. No respiratory distress.      Breath sounds: Normal breath sounds. No wheezing.   Abdominal:      Palpations: Abdomen is soft.      Tenderness: There is no abdominal tenderness.   Musculoskeletal:         General: No swelling.      Cervical back: Neck supple.   Skin:     General: Skin is warm and dry.      Capillary Refill: Capillary refill takes less than 2 seconds.   Neurological:      Mental Status: She is alert.   Psychiatric:         Mood and Affect: Mood normal.         Behavior: Behavior normal.          Additional Data:     Labs:  Results from last 7 days   Lab Units 24  0540 24  0550 24  0538   WBC Thousand/uL 9.33   < > 7.60   HEMOGLOBIN g/dL 11.6   < > 11.1*   HEMATOCRIT % 37.9   < > 36.3   PLATELETS Thousands/uL 280   < > 299   NEUTROS PCT %  --   --  53   LYMPHS PCT %  --   --  25   MONOS PCT %  --   --  8   EOS PCT %  --   --  13*    < > = values in this interval not displayed.      Results from last 7 days   Lab Units 03/04/24  0540 02/29/24  0538 02/28/24  0340   SODIUM mmol/L 137   < > 140   POTASSIUM mmol/L 4.1   < > 4.1   CHLORIDE mmol/L 101   < > 101   CO2 mmol/L 30   < > 31   BUN mg/dL 21   < > 17   CREATININE mg/dL 0.47*   < > 0.61   ANION GAP mmol/L 6   < > 8   CALCIUM mg/dL 9.3   < > 10.1   ALBUMIN g/dL  --   --  3.7   TOTAL BILIRUBIN mg/dL  --   --  0.27   ALK PHOS U/L  --   --  54   ALT U/L  --   --  4*   AST U/L  --   --  14   GLUCOSE RANDOM mg/dL 161*   < > 128    < > = values in this interval not displayed.         Results from last 7 days   Lab Units 03/04/24  1056 03/04/24  0814 03/03/24 2056 03/03/24  1710 03/03/24  1152 03/03/24  0645 03/02/24  2108 03/02/24  1715 03/02/24  1121 03/02/24  0732 03/01/24 2050 03/01/24  1604   POC GLUCOSE mg/dl 196* 137 160* 110 168* 140 237* 100 154* 132 185* 145*     Results from last 7 days   Lab Units 02/28/24  0438   HEMOGLOBIN A1C % 6.0*           Lines/Drains:  Invasive Devices       Peripheral Intravenous Line  Duration             Peripheral IV 02/27/24 Left;Ventral (anterior) Forearm 5 days              Drain  Duration             Ureteral Internal Stent Left ureter 6 Fr. 105 days                          Imaging: No pertinent imaging reviewed.    Recent Cultures (last 7 days):         Last 24 Hours Medication List:   Current Facility-Administered Medications   Medication Dose Route Frequency Provider Last Rate    acetaminophen  975 mg Oral Q8H JILLIAN Mary Whyte DPM      albuterol  2 puff Inhalation Q6H PRN Mary Whyte DPM      aspirin  81 mg Oral Daily Mary Whyte DPM      buPROPion  300 mg Oral Daily Mary Whyte DPM      cefpodoxime  400 mg Oral BID With Meals Mary Whyte DPM      clonazePAM  1 mg Oral QPM Mary Whyte DPM      cyanocobalamin  1,000 mcg Oral Daily Mary Whyte DPM      fish oil  1,000 mg Oral Daily Mary Whyte DPM       fluticasone-vilanterol  1 puff Inhalation Daily Mary Whyte DPM      insulin lispro  1-5 Units Subcutaneous TID AC Mary Whyte DPM      insulin lispro  1-5 Units Subcutaneous HS Mary Whyte DPM      magnesium sulfate  2 g Intravenous Once Ofe Wood MD      methocarbamol  250 mg Oral Q8H JILLIAN Mary Whyte DPM      multivitamin-minerals  1 tablet Oral Daily Mary Whyte DPM      nicotine  1 patch Transdermal Daily Mary Whyte DPM      oxyCODONE  2.5 mg Oral Q4H PRN Mary Whyte DPM      Or    oxyCODONE  5 mg Oral Q4H PRN Mary Whyte DPM      pantoprazole  40 mg Oral Early Morning Mary Whyte DPM      polyethylene glycol  17 g Oral Daily Mary Whyte DPM      pregabalin  200 mg Oral TID Mary Whyte DPM      rivaroxaban  2.5 mg Oral BID Kavya Lang MD      senna-docusate sodium  1 tablet Oral BID Mary Whyte DPM          Today, Patient Was Seen By: Ofe Wood MD    **Please Note: This note may have been constructed using a voice recognition system.**

## 2024-03-04 NOTE — ASSESSMENT & PLAN NOTE
Not in exacerbation  Home Advair replaced by equivalent formulary Breo  Continue albuterol as needed

## 2024-03-05 ENCOUNTER — HOSPITAL ENCOUNTER (INPATIENT)
Facility: HOSPITAL | Age: 79
LOS: 9 days | Discharge: HOME HEALTH | DRG: 560 | End: 2024-03-14
Attending: INTERNAL MEDICINE | Admitting: INTERNAL MEDICINE
Payer: COMMERCIAL

## 2024-03-05 VITALS
WEIGHT: 153 LBS | HEART RATE: 68 BPM | HEIGHT: 61 IN | BODY MASS INDEX: 28.89 KG/M2 | TEMPERATURE: 98 F | SYSTOLIC BLOOD PRESSURE: 115 MMHG | DIASTOLIC BLOOD PRESSURE: 53 MMHG | OXYGEN SATURATION: 94 % | RESPIRATION RATE: 15 BRPM

## 2024-03-05 DIAGNOSIS — E11.42 TYPE 2 DIABETES MELLITUS WITH DIABETIC POLYNEUROPATHY, WITHOUT LONG-TERM CURRENT USE OF INSULIN (HCC): Chronic | ICD-10-CM

## 2024-03-05 DIAGNOSIS — E11.621 DIABETIC FOOT ULCER WITH OSTEOMYELITIS (HCC): ICD-10-CM

## 2024-03-05 DIAGNOSIS — I10 ESSENTIAL HYPERTENSION: Chronic | ICD-10-CM

## 2024-03-05 DIAGNOSIS — M81.0 AGE-RELATED OSTEOPOROSIS WITHOUT CURRENT PATHOLOGICAL FRACTURE: ICD-10-CM

## 2024-03-05 DIAGNOSIS — S31.000D WOUND OF SACRAL REGION, SUBSEQUENT ENCOUNTER: ICD-10-CM

## 2024-03-05 DIAGNOSIS — D64.9 ANEMIA: ICD-10-CM

## 2024-03-05 DIAGNOSIS — T81.49XA SURGICAL SITE INFECTION: Primary | ICD-10-CM

## 2024-03-05 DIAGNOSIS — L97.509 DIABETIC FOOT ULCER WITH OSTEOMYELITIS (HCC): ICD-10-CM

## 2024-03-05 DIAGNOSIS — E11.69 DIABETIC FOOT ULCER WITH OSTEOMYELITIS (HCC): ICD-10-CM

## 2024-03-05 DIAGNOSIS — M86.9 DIABETIC FOOT ULCER WITH OSTEOMYELITIS (HCC): ICD-10-CM

## 2024-03-05 DIAGNOSIS — I73.9 PAD (PERIPHERAL ARTERY DISEASE) (HCC): ICD-10-CM

## 2024-03-05 DIAGNOSIS — S31.000A WOUND OF SACRAL REGION, INITIAL ENCOUNTER: ICD-10-CM

## 2024-03-05 DIAGNOSIS — F17.200 TOBACCO USE DISORDER: Chronic | ICD-10-CM

## 2024-03-05 DIAGNOSIS — S31.109A WOUND OF RIGHT GROIN, INITIAL ENCOUNTER: ICD-10-CM

## 2024-03-05 DIAGNOSIS — Z78.9 IMPAIRED MOBILITY AND ACTIVITIES OF DAILY LIVING: ICD-10-CM

## 2024-03-05 DIAGNOSIS — Z74.09 IMPAIRED MOBILITY AND ACTIVITIES OF DAILY LIVING: ICD-10-CM

## 2024-03-05 LAB
GLUCOSE SERPL-MCNC: 107 MG/DL (ref 65–140)
GLUCOSE SERPL-MCNC: 114 MG/DL (ref 65–140)
GLUCOSE SERPL-MCNC: 139 MG/DL (ref 65–140)
GLUCOSE SERPL-MCNC: 199 MG/DL (ref 65–140)

## 2024-03-05 PROCEDURE — 99222 1ST HOSP IP/OBS MODERATE 55: CPT | Performed by: INTERNAL MEDICINE

## 2024-03-05 PROCEDURE — 99239 HOSP IP/OBS DSCHRG MGMT >30: CPT | Performed by: PHYSICIAN ASSISTANT

## 2024-03-05 PROCEDURE — 99223 1ST HOSP IP/OBS HIGH 75: CPT | Performed by: STUDENT IN AN ORGANIZED HEALTH CARE EDUCATION/TRAINING PROGRAM

## 2024-03-05 PROCEDURE — NC001 PR NO CHARGE: Performed by: PODIATRIST

## 2024-03-05 PROCEDURE — NC001 PR NO CHARGE: Performed by: STUDENT IN AN ORGANIZED HEALTH CARE EDUCATION/TRAINING PROGRAM

## 2024-03-05 PROCEDURE — 82948 REAGENT STRIP/BLOOD GLUCOSE: CPT

## 2024-03-05 RX ORDER — INSULIN LISPRO 100 [IU]/ML
1-5 INJECTION, SOLUTION INTRAVENOUS; SUBCUTANEOUS
Start: 2024-03-05 | End: 2024-03-14

## 2024-03-05 RX ORDER — ALBUTEROL SULFATE 90 UG/1
2 AEROSOL, METERED RESPIRATORY (INHALATION) EVERY 6 HOURS PRN
Status: DISCONTINUED | OUTPATIENT
Start: 2024-03-05 | End: 2024-03-14 | Stop reason: HOSPADM

## 2024-03-05 RX ORDER — CEFPODOXIME PROXETIL 200 MG/1
400 TABLET, FILM COATED ORAL 2 TIMES DAILY WITH MEALS
Status: DISCONTINUED | OUTPATIENT
Start: 2024-03-05 | End: 2024-03-14 | Stop reason: HOSPADM

## 2024-03-05 RX ORDER — POLYETHYLENE GLYCOL 3350 17 G/17G
17 POWDER, FOR SOLUTION ORAL DAILY
Status: DISCONTINUED | OUTPATIENT
Start: 2024-03-06 | End: 2024-03-14 | Stop reason: HOSPADM

## 2024-03-05 RX ORDER — INSULIN LISPRO 100 [IU]/ML
1-5 INJECTION, SOLUTION INTRAVENOUS; SUBCUTANEOUS
Status: DISCONTINUED | OUTPATIENT
Start: 2024-03-05 | End: 2024-03-12

## 2024-03-05 RX ORDER — NICOTINE 21 MG/24HR
1 PATCH, TRANSDERMAL 24 HOURS TRANSDERMAL DAILY
Status: DISCONTINUED | OUTPATIENT
Start: 2024-03-06 | End: 2024-03-14 | Stop reason: HOSPADM

## 2024-03-05 RX ORDER — AMOXICILLIN 250 MG
1 CAPSULE ORAL 2 TIMES DAILY
Status: DISCONTINUED | OUTPATIENT
Start: 2024-03-05 | End: 2024-03-14 | Stop reason: HOSPADM

## 2024-03-05 RX ORDER — METHOCARBAMOL 500 MG/1
250 TABLET, FILM COATED ORAL EVERY 8 HOURS SCHEDULED
Status: DISCONTINUED | OUTPATIENT
Start: 2024-03-05 | End: 2024-03-14 | Stop reason: HOSPADM

## 2024-03-05 RX ORDER — ASPIRIN 81 MG/1
81 TABLET, CHEWABLE ORAL DAILY
Status: DISCONTINUED | OUTPATIENT
Start: 2024-03-06 | End: 2024-03-14 | Stop reason: HOSPADM

## 2024-03-05 RX ORDER — FLUTICASONE FUROATE AND VILANTEROL 200; 25 UG/1; UG/1
1 POWDER RESPIRATORY (INHALATION) DAILY
Status: DISCONTINUED | OUTPATIENT
Start: 2024-03-06 | End: 2024-03-14 | Stop reason: HOSPADM

## 2024-03-05 RX ORDER — CLONAZEPAM 1 MG/1
1 TABLET ORAL EVERY EVENING
Status: DISCONTINUED | OUTPATIENT
Start: 2024-03-05 | End: 2024-03-14 | Stop reason: HOSPADM

## 2024-03-05 RX ORDER — OXYCODONE HYDROCHLORIDE 5 MG/1
5 TABLET ORAL EVERY 4 HOURS PRN
Status: DISCONTINUED | OUTPATIENT
Start: 2024-03-05 | End: 2024-03-13

## 2024-03-05 RX ORDER — PREGABALIN 100 MG/1
200 CAPSULE ORAL 3 TIMES DAILY
Status: DISCONTINUED | OUTPATIENT
Start: 2024-03-05 | End: 2024-03-14 | Stop reason: HOSPADM

## 2024-03-05 RX ORDER — ACETAMINOPHEN 325 MG/1
975 TABLET ORAL EVERY 8 HOURS SCHEDULED
Status: DISCONTINUED | OUTPATIENT
Start: 2024-03-05 | End: 2024-03-14 | Stop reason: HOSPADM

## 2024-03-05 RX ORDER — HYDRALAZINE HYDROCHLORIDE 25 MG/1
25 TABLET, FILM COATED ORAL EVERY 8 HOURS PRN
Status: DISCONTINUED | OUTPATIENT
Start: 2024-03-05 | End: 2024-03-14 | Stop reason: HOSPADM

## 2024-03-05 RX ORDER — PANTOPRAZOLE SODIUM 40 MG/1
40 TABLET, DELAYED RELEASE ORAL
Status: DISCONTINUED | OUTPATIENT
Start: 2024-03-06 | End: 2024-03-14 | Stop reason: HOSPADM

## 2024-03-05 RX ORDER — BUPROPION HYDROCHLORIDE 150 MG/1
300 TABLET ORAL DAILY
Status: DISCONTINUED | OUTPATIENT
Start: 2024-03-06 | End: 2024-03-14 | Stop reason: HOSPADM

## 2024-03-05 RX ORDER — CHLORAL HYDRATE 500 MG
1000 CAPSULE ORAL DAILY
Status: DISCONTINUED | OUTPATIENT
Start: 2024-03-06 | End: 2024-03-14 | Stop reason: HOSPADM

## 2024-03-05 RX ADMIN — PANTOPRAZOLE SODIUM 40 MG: 40 TABLET, DELAYED RELEASE ORAL at 05:57

## 2024-03-05 RX ADMIN — CEFPODOXIME PROXETIL 400 MG: 200 TABLET, FILM COATED ORAL at 08:51

## 2024-03-05 RX ADMIN — BUPROPION HYDROCHLORIDE 300 MG: 150 TABLET, FILM COATED, EXTENDED RELEASE ORAL at 08:50

## 2024-03-05 RX ADMIN — ASPIRIN 81 MG CHEWABLE TABLET 81 MG: 81 TABLET CHEWABLE at 08:50

## 2024-03-05 RX ADMIN — Medication 1 TABLET: at 08:50

## 2024-03-05 RX ADMIN — RIVAROXABAN 2.5 MG: 2.5 TABLET, FILM COATED ORAL at 08:51

## 2024-03-05 RX ADMIN — PREGABALIN 200 MG: 100 CAPSULE ORAL at 21:21

## 2024-03-05 RX ADMIN — RIVAROXABAN 2.5 MG: 2.5 TABLET, FILM COATED ORAL at 17:18

## 2024-03-05 RX ADMIN — METHOCARBAMOL 250 MG: 500 TABLET ORAL at 21:21

## 2024-03-05 RX ADMIN — PREGABALIN 200 MG: 100 CAPSULE ORAL at 08:50

## 2024-03-05 RX ADMIN — ACETAMINOPHEN 975 MG: 325 TABLET, FILM COATED ORAL at 21:21

## 2024-03-05 RX ADMIN — OMEGA-3 FATTY ACIDS CAP 1000 MG 1000 MG: 1000 CAP at 08:50

## 2024-03-05 RX ADMIN — PREGABALIN 200 MG: 100 CAPSULE ORAL at 16:44

## 2024-03-05 RX ADMIN — METHOCARBAMOL 250 MG: 500 TABLET ORAL at 13:39

## 2024-03-05 RX ADMIN — CYANOCOBALAMIN TAB 500 MCG 1000 MCG: 500 TAB at 08:50

## 2024-03-05 RX ADMIN — ACETAMINOPHEN 975 MG: 325 TABLET, FILM COATED ORAL at 05:57

## 2024-03-05 RX ADMIN — INSULIN LISPRO 1 UNITS: 100 INJECTION, SOLUTION INTRAVENOUS; SUBCUTANEOUS at 21:22

## 2024-03-05 RX ADMIN — METHOCARBAMOL 250 MG: 500 TABLET ORAL at 05:57

## 2024-03-05 RX ADMIN — SENNOSIDES, DOCUSATE SODIUM 1 TABLET: 8.6; 5 TABLET ORAL at 17:18

## 2024-03-05 RX ADMIN — ACETAMINOPHEN 975 MG: 325 TABLET, FILM COATED ORAL at 13:39

## 2024-03-05 RX ADMIN — CEFPODOXIME PROXETIL 400 MG: 200 TABLET, FILM COATED ORAL at 16:44

## 2024-03-05 RX ADMIN — CLONAZEPAM 1 MG: 1 TABLET ORAL at 17:18

## 2024-03-05 RX ADMIN — SENNOSIDES, DOCUSATE SODIUM 1 TABLET: 8.6; 5 TABLET ORAL at 08:50

## 2024-03-05 RX ADMIN — NICOTINE 1 PATCH: 14 PATCH, EXTENDED RELEASE TRANSDERMAL at 08:49

## 2024-03-05 RX ADMIN — FLUTICASONE FUROATE AND VILANTEROL TRIFENATATE 1 PUFF: 200; 25 POWDER RESPIRATORY (INHALATION) at 08:51

## 2024-03-05 NOTE — TREATMENT PLAN
Individualized Plan of Care - [facility] [place]  Lona Cedeno 78 y.o. female MRN: [id number]  Unit/Bed#: [facility] 974-01 Encounter: [id number]     PATIENT INFORMATION  ADMISSION DATE: 3/5/2024  2:03 PM ANGELINA CATEGORY:Other Disabling Impairments:  13  Other Disabling Impairments   ADMISSION DIAGNOSIS: Diabetic foot ulcer with osteomyelitis (HCC) [E11.621, E11.69, L97.509, M86.9]  EXPECTED LOS: 10 to 14 days     MEDICAL/FUNCTIONAL PROGNOSIS  Based on my assessment of the patient's medical conditions and current functional status, the prognosis for attaining medical and functional goals or the IRF stay is:  Good    Medical Goals: Patient will be medically stable for discharge to [facility] upon completion of rehab program and Patient will be able to manage medical conditions and comorbid conditions with medications and follow up upon completion of rehab program    Cardiopulmonary function: Ensure cardiopulmonary stability and optimize cardiopulmonary function not only at rest but with activity as patient's activity level significantly increases in acute rehab compared with prior to transfer in preparation for safe discharge from [facility].  Must closely and frequently monitor blood pressure and HR to ensure adequate cardiac output during ADLs and ambulation as patient is at increased risk for orthostatic hypotension/syncope and potential injury if not monitored for and managed adequately.    Blood pressure management:    Frequent monitoring of blood pressure with appropriate adjustments in blood pressure medication management to optimize blood pressure control and prevent/limit renal complications.   Monitoring impact of blood pressure and side-effects of blood pressure medications at rest and with activity.  Hypoxia prevention: Ensure appropriate level of oxygenation at rest and with activity to avoid symptomatic hypoxia, maximize functional performance, and decrease risk of atelectasis/pneumonia  through close and frequent monitoring, providing appropriate respiratory treatments (such as incentive spirometry), and when necessary provide/adjust respiratory medications.    DM: Patient will improve/maintain blood sugar control to ensure optimal healing and decrease risks of complications associated with DM through medication monitoring, adjustments as indicated, and optimal dietary intake and education.  Pain management:  Pain will improve with frequent evaluation of pain, careful adjustments in medications, frequent re-evaluation of patient's pain and medical/neurologic status to ensure optimal pain control, avoidance of potential serious and even life-threatening side-effects and drug interactions, as well as weaning pain medications as soon as possible to decrease risk of short and long-term use.     Cognitive impairment: intensive skilled therapies including speech language pathology and occupation therapy to evaluate and treat deficits in cognition.  Close oversight and management by rehab specialized physician of cognitive deficits and potential confounding factors (prevention of, monitoring for, and if found treatment of possible complications such as infections, as well as optimally managing sleep, mood, and medications which can negatively impact cognition and functional recovery).  Orthopedic Disorder: Right to amputation with split thickness grafting and debridement  causing impaired mobility, ADLs, and gait:  intensive skilled therapies with physical therapy and occupational therapy with close oversight and management by rehab specialized physician in acute rehabilitation setting to most expeditiously and effectively improve functional mobility, transfers, upper and lower body strengthening, conditioning, balance, and gait training with appropriate assistive device.  Patient will have optimal supervision and management of patient's underlying orthopedic disorder with specialized rehabilitation  physician during this period of recovery to ensure most expeditious and optimal recovery with decreased risks of fall/injury and other complications including acute worsening of ortho disorder, decrease risk of VTE, PNA, and skin ulceration.  Inpatient rehabilitation education/teaching:  To be provided to patient and typically family/caregiver (if able to be identified) by all skilled therapists, rehab nursing, case management, and rehab specialized physician to ensure optimal recovery and decrease risks of complications in both acute rehabilitation setting as well as after discharge.   Anxiety (and/or Depression): Patient's mood and it's impact on therapy participation and functional recovery will improve during course with supportive counseling, relaxation/breathing techniques and if necessary medication management.  Requires frequent re-assessment and close management to ensure anxiety/depression management during acute rehab course with planning for appropriate outpatient management to ensure optimal mental health and functional recovery.    Substance abuse hx: Tobacco dependence history - provide teaching/resources on substance abuse to patient (and family if appropriate) as well as counseling on abstinence, coping with anxiety/depression, and provide outpatient resources.  Rehab physician oversight of behavioral management and if necessary pharmacologic management during course.  Recommend psychology consultation and management as well during acute rehab course and possible additional recommendations after discharge from acute rehab setting.    Bladder dysfunction:  Appropriate bladder management with appropriate toileting program from rehab nursing and staff with oversight management by rehabilitation physicians which include appropriate monitoring and possible adjustments in medications to with goals to optimize bladder function and decrease risk of bladder retention, incontinence, and urinary tract  infection.   Bowel dysfunction: Appropriate bowel management with appropriate toileting program from rehab nursing and staff with oversight management by rehabilitation physicians which include adjustments in medications to optimize appropriate bowel function and prevent/decrease risk of constipation and bowel obstruction.    Obesity:  Close monitoring of nutrition status, nutrition specialist with adjustments in diet.   on appropriate short and long term nutrition and activity.  Obesity increases complexity of patient's overall condition and causes unique challenges during this part of patient's recovery process.  Supervise and if necessary make adjustments in rehab nursing care and skilled therapy care to ensure appropriate toileting, bed mobility, other ADLs, and ambulation to decrease risk of falls/injuries, VTE, skin breakdown/ulceration and optimize functional recovery.    Skin wounds: Appropriate skin checks for wound/skin evaluation including evaluation of healing, worsening of wounds, or signs of infection.   Wound care management from rehab nursing, wound care nursing, physicians.  Ensure frequent appropriate turning, positioning in bed, in chair, when mobilizing, and when appropriate with use of appropriate devices to optimize healing and decrease risk of worsening or new skin breakdown.        ANTICIPATED DISCHARGE DISPOSITION AND SERVICES  COMMUNITY SETTING: Home - independent/modified independent    ANTICIPATED FOLLOW-UP SERVICE:   Home Health Services: PT, OT, and Nursing      DISCIPLINE SPECIFIC PLANS:  Required Disciplines & Services: Rehabillitation Nursing, Case Management, Diabetes Education, and Psychology    REQUIRED THERAPY:  Therapy Hours per Day Days per Week Total Days   Physical Therapy 1.5 5 10-14   Occupational Therapy 1.5 5 10-14   NOTE: Additional therapy time(s) or changes to allocation of therapies as appropriate to meet patient needs and to achieve functional  goals.      Patient will participate in above therapy regimen consisting of PT and OT due to the following medical procedure/condition:Other Disabling Impairments:  13  Other Disabling Impairments    ANTICIPATED FUNCTIONAL OUTCOMES:  ADL:  Modified independent   Bladder/Bowel:  Modified independent   Transfers:  Modified independent   Locomotion:  Modified independent with rolling walker short distances,   Cognitive:  Supervision to independent     DISCHARGE PLANNING NEEDS  Equipment needs: Discharge needs to be reviewed with team      REHAB ANTICIPATED PARTICIPATION RESTRICTIONS:  Amubulate Short Distances Only, Assist with Bathing in Tub, Assist with Mobility, Decreaed Safety Awareness, Inability to Drive, Rquires Assist with ADLS, Requires Assit with Homemaking, Requires Assit with Steps, and Weight Bearing Non to right leg    Rigo Hoover, DO  Physical Medicine and Rehabilitation  [facility]

## 2024-03-05 NOTE — CASE MANAGEMENT
Case Management Discharge Planning Note    Patient name Lona Cedeno  Location NW8 876/NW8 876-01 MRN [id number]  : 1945 Date 3/5/2024       Current Admission Date: 2024  Current Admission Diagnosis:Diabetic foot ulcer with osteomyelitis (HCC)   Patient Active Problem List    Diagnosis Date Noted    Sacral wound 2024    Impulsive 2024    Other dietary vitamin B12 deficiency anemia 2024    Leukocytosis 2024    Surgical site infection 2024    Sepsis without acute organ dysfunction (HCC) 2024    At risk for venous thromboembolism (VTE) 2024    At high risk for skin breakdown 2024    Wound of skin 2024    Impaired mobility and activities of daily living 2024    Acute pain due to injury 2024    Hematoma 2024    Anemia 2024    Constipation 2024    Preoperative cardiovascular examination 2024    Diabetic foot ulcer with osteomyelitis (HCC) 2024    Ankle ulcer, right, with fat layer exposed ([facility]) 2023    Age-related osteoporosis without current pathological fracture 2023    Abnormal CT of the chest 10/17/2023    COPD, severity to be determined ([facility]) 10/17/2023    Tobacco use disorder 10/17/2023    PAD (peripheral artery disease) ([facility]) 10/10/2023    Bladder neoplasm 2023    Left renal mass 2023    Lactic acidosis 2023    Hypomagnesemia 2023    Degenerative lumbar disc 2023    Urinary incontinence 2023    GERD without esophagitis 10/16/2019    Essential hypertension 02/15/2019    Anxiety and depression 02/15/2019    Type 2 diabetes mellitus with diabetic polyneuropathy (HCC) 2019      LOS (days): 7  Geometric Mean LOS (GMLOS) (days): 5.4  Days to GMLOS:-1.3     OBJECTIVE:  Risk of Unplanned Readmission Score: 22.98         Current admission status: Inpatient   Preferred Pharmacy:   [id number]  [address]  [facility] [address]  Phone: [phone] Fax: [phone]    [facility] [place], [place] - [address]  [address] [place] [address]  Phone: [phone] Fax: [phone]    Primary Care Provider: Vivek Preston DO    Primary Insurance: [facility] REP  Secondary Insurance:     DISCHARGE DETAILS:    Other Referral/Resources/Interventions Provided:  Referral Comments: Insurance authorization received. [facility] able to accept today.    Treatment Team Recommendation: Acute Rehab  Discharge Destination Plan:: Acute Rehab  Transport at Discharge : Other (Comment) (Internal transfer)    ETA of Transport (Date): 03/05/24  ETA of Transport (Time): 1400    Family notified:: CM spoke with pt's , Carmelo, and informed him of 2pm transfer today to room 974 at [facility]. Provider and nursing aware of transfer time as well.

## 2024-03-05 NOTE — H&P
[facility]  H&P  Name: Lona Cedeno 78 y.o. female I MRN: [id number]  Unit/Bed#: [facility] 974-01 I Date of Admission: 3/5/2024   Date of Service: 3/5/2024 I Hospital Day: 0      Assessment/Plan   Surgical site infection  Assessment & Plan  S/p right axilla/femoral bypass 1/31/2024  Postop infection s/p washout/debridement 2/14 and 2/19  VAC removed 2/28/2024  Continue oral antibiotics through 3/22/2024  Cultures positive Klebsiella/Proteus  Continue local dressing changes  Will consult vascular surgery if needed for local care    Diabetic foot ulcer with osteomyelitis (HCC)  Assessment & Plan  Lab Results   Component Value Date    HGBA1C 6.0 (H) 02/28/2024       Recent Labs     03/04/24  1651 03/04/24 2044 03/05/24  0716 03/05/24  1159   POCGLU 153* 152* 139 107       Blood Sugar Average: Last 72 hrs:    S/p medial ulcer debridement/partial second toe amputation 2/4/2024  Recent STSG on 2/28/2024 with VAC removal 3/4/2024  Continue local care  NWB  Consult podiatry for management  Consult PMR      PAD (peripheral artery disease) ([facility])  Assessment & Plan  S/p right axillo-femoral bypass 1/31/2024  Continue aspirin/statin/low-dose Xarelto    Essential hypertension  Assessment & Plan  Continue current medications  stable    Type 2 diabetes mellitus with diabetic polyneuropathy (HCC)  Assessment & Plan  Lab Results   Component Value Date    HGBA1C 6.0 (H) 02/28/2024       Recent Labs     03/04/24  1651 03/04/24  2044 03/05/24  0716 03/05/24  1159   POCGLU 153* 152* 139 107       Blood Sugar Average: Last 72 hrs:    Home: Takes metformin 1000 mg twice daily/Januvia 100 mg at lunchtime  Here: Sliding scale/DM diet  Will work to resuming oral medications during her stay in [facility]    Sacral wound  Assessment & Plan  Consult wound nurse for management  Continue offloading    Tobacco use disorder  Assessment & Plan  Continue nicotine patch  Recommend cessation                History of Present  Illness:   Lona Cedeno is a 78 y.o. female with a PMH of HTN, DM, HLD, obesity, GERD, COPD, neuropathy, tobacco abuse, anxiety/depression, PAD s/p right axillo-femoral bypass on 1/31/2024, bladder CA, anemia, B12 deficiency who presented to the [facility] on 1/23/2024 to [facility] and was transferred to [facility] on 1/26/2024 she was found to have osteomyelitis of the second digit on her right foot, vascular surgery was consulted she ultimately ended up undergoing right axillofemoral bypass on 1/31/2024, and then had right medial ulcer debridement/partial second toe amputation secondary to osteomyelitis by podiatry on 2/4/2024 she was then discharged to [facility] on 2/9/2024 however she did develop signs and symptoms of infection of her right groin and therefore was transferred back to the acute side on 2/13/2024 and underwent debridement x 2 on 2/14 as well as 2/19, she had VAC placed as well and was transferred back to [facility] on 2/23/2024 she had completed her antibiotic course via IV antibiotics and was continued on oral antibiotics and will take that through 3/22/2024 according to infectious disease.  She was then transferred back to the acute side on 2/27/2024 for us lip thickness skin grafting to the right ankle she underwent that procedure on 2/28/2024 without any difficulty.  During her stay on the acute side the right groin VAC was removed she is now with local dressing changes to that area and the VAC was also removed from the right ankle she continues to be nonweightbearing on that right lower extremity but is cleared to come back to [facility] for ongoing rehabilitation.     Review of Systems: A 10 point ROS was performed; negative except as noted above.       Social History:    Substance Use History:   Social History     Substance and Sexual Activity   Alcohol Use Not Currently     Social History     Tobacco Use   Smoking Status Every Day    Current packs/day: 1.00     Average packs/day: 1 pack/day for 63.2 years (63.2 ttl pk-yrs)    Types: Cigarettes    Start date: 1961   Smokeless Tobacco Never   Tobacco Comments    11/14/23 smokes about 3/4 PPD     Social History     Substance and Sexual Activity   Drug Use Never       Family History:    Family History   Problem Relation Age of Onset    COPD Mother     Emphysema Mother     COPD Father     Emphysema Father          Review of Scheduled Meds:      Physical Exam:  Temp:  [98 °F (36.7 °C)-98.5 °F (36.9 °C)] 98 °F (36.7 °C)  HR:  [68-93] 68  Resp:  [15] 15  BP: (111-144)/(53-65) 115/53  SpO2:  [94 %] 94 %    GEN: NAD; pleasant  NEURO: Alert and oriented x4; appropriate  HEENT: Pupils are equal/reactive; normocephalic, face is symmetrical, hearing is mildly impaired  CV: S1 S2 regular, no murmur/rub/gallops, 1/4 pedal pulses, no LE edema present  RESP: Lungs are clear bilaterally, no wheezes rales or rhonchi, on room air, no distress, respirations are easy and non labored  GI: Abdomen is obese, soft, non tender, +BS x4; non distended  : Voiding without issues  MUSC: Moves all extremities except generalized deconditioning  SKIN: pink, warm, normal turgor, no rashes or lesions noted; right groin dressing in place, refer to media images, RLE prevalon boot in place with ace wrap      Laboratory:    Results from last 7 days   Lab Units 03/04/24  0540 03/03/24  0631 03/01/24  0550   HEMOGLOBIN g/dL 11.6 11.1* 11.5   HEMATOCRIT % 37.9 35.4 37.1   WBC Thousand/uL 9.33 8.83 9.46     Results from last 7 days   Lab Units 03/04/24  0540 03/03/24  0631 03/01/24  0550 02/29/24  0538 02/28/24  0340   BUN mg/dL 21 17 21   < > 17   SODIUM mmol/L 137 140 140   < > 140   POTASSIUM mmol/L 4.1 4.2 4.1   < > 4.1   CHLORIDE mmol/L 101 103 101   < > 101   CREATININE mg/dL 0.47* 0.48* 0.49*   < > 0.61   AST U/L  --   --   --   --  14   ALT U/L  --   --   --   --  4*    < > = values in this interval not displayed.            Wt Readings from Last 1  "Encounters:   02/27/24 69.4 kg (153 lb)     Estimated body mass index is 28.91 kg/m² as calculated from the following:    Height as of 2/27/24: 5' 1\" (1.549 m).    Weight as of 2/27/24: 69.4 kg (153 lb).    Imaging:  No orders to display       Prior    Counseling / Coordination of Care:   Total floor / unit time spent today 60 minutes. and Greater than 50% of total time was spent with the patient and / or family counseling and / or coordination of care.  A description of the counseling / coordination of care: 30.      ** Please Note: Fluency Direct voice to text software may have been used in the creation of this document. **        "

## 2024-03-05 NOTE — DISCHARGE SUMMARY
[facility]  Discharge- Lona Cedeno 1945, 78 y.o. female MRN: [id number]  Unit/Bed#: [facility] 876-01 Encounter: [id number]  Primary Care Provider: Vivek Preston DO   Date and time admitted to hospital: 2/27/2024  4:15 PM    * Diabetic foot ulcer with osteomyelitis (HCC)  Assessment & Plan  Right posterior ankle ulcer with exposed Achilles tendon s/p wound debridement on 2/4/24  Osteomyelitis of right second toe s/p amputation on 2/4/24  Transferred from [facility] for STSG. S/p STSG application on 2/28/24 by Podiatry  Podiatry removed RLE wound VAC 3/4/24, Podiatry to continue to follow while patient in [facility] for local wound care, Patient is stable from podiatry standpoint to be transferred to [facility], no future surgical plans.        Surgical site infection  Assessment & Plan  Hospitalized from 2/13/24 to 2/23/24 for right groin surgical wound infection from previous RLE bypass  S/p debridement on 2/14/2024 and washout on 2/19/2024  Wound cultures - Klebsiella and Proteus  S/p removal of vac by vascular surgery on 2/28, they have since signed off   ID recommended continuation of antibiotics through 3/22/2024 due to proximity of wound infection to the bypass graft - on Cefpodoxime 400 mg twice daily  S/p STSG 2/28/24 with wound vac by podiatry. Podiatry removed RLE wound VAC 3/4, Podiatry to continue to follow while patient in [facility] for local wound care     Sacral wound  Assessment & Plan  Noted to have unstageable wound on the sacrum on initial admission  Continue wound care    Type 2 diabetes mellitus with diabetic polyneuropathy (HCC)  Assessment & Plan  Lab Results   Component Value Date    HGBA1C 6.0 (H) 02/28/2024       Recent Labs     03/04/24  1056 03/04/24  1651 03/04/24  2044 03/05/24  0716   POCGLU 196* 153* 152* 139         Blood Sugar Average: Last 72 hrs:  (P) 152.3170518850915749  Continue SSI  Accu-Cheks reviewed  Continue Lyrica    PAD (peripheral artery disease)  "(HCC)  Assessment & Plan  Continue ASA, statin, Xarelto  Vascular managing right groin wound. Right groin wound vac removed as above, and recs for wound care     COPD, severity to be determined (HCC)  Assessment & Plan  Not in exacerbation  Home Advair replaced by equivalent formulary Breo  Continue albuterol as needed    Anxiety and depression  Assessment & Plan  Continue Wellbutrin  mg daily and Clonazepam 1 mg at bedtime    GERD without esophagitis  Assessment & Plan  Continue PPI    Hypomagnesemia  Assessment & Plan  Mag 1.7 3/4 and was repleted with 2G  Monitor    Tobacco use disorder  Assessment & Plan  Continue nicotine patch  Encourage Smoking cessation       Medical Problems       Resolved Problems  Date Reviewed: 3/5/2024   None       Discharging Physician / Practitioner: Zafar Geronimo PA-C  PCP: Vivek Preston DO  Admission Date:   Admission Orders (From admission, onward)       Ordered        02/27/24 1630  Inpatient Admission  Once                          Discharge Date: 03/05/24    Consultations During Hospital Stay:  Vascular Surgery  Podiatry  PT  OT  Case management     Procedures Performed:   SKIN GRAFT SPLIT THICKNESS (STSG) EXTREMITY, Wound vac dressing change (Right)   CBC  CMP  HbA1c    Significant Findings / Test Results:   SKIN GRAFT SPLIT THICKNESS (STSG) EXTREMITY, Wound vac dressing change (Right): \"Operative Findings:  -Split thickness skin graft to right lower extremity with wound vac application   -Donor site with pinpoint healthy bleeding  -Viable flap with healthy bleeding, granular wound base at right posterior and medial ankle \"    Incidental Findings:   None     Test Results Pending at Discharge (will require follow up):   None     Outpatient Tests Requested:  Follow up with PCP, Vascular, Podiatry    Complications:  None    Reason for Admission: transferred from [facility] for [facility]    Hospital Course:   Lona Cedeno is a 78 y.o. female with right posterior ankle ulcer with " "exposed Achilles tendon s/p wound debridement, OM of the right second toe s/p amputation, right groin surgical wound infection from previous RLE bypass s/p debridement and washout on cefpodoxime, PAD, DM2, tobacco use, GERD, anxiety and depression, COPD, and sacral wound who originally presented to the hospital on 2/27/2024 due to as a transfer from [facility] for STSG by Podiatry. Podiatry and Vascular Surgery were consulted. Left groin wound vac removed by Vascular. Underwent STSG by Podiatry 2/28. Vascular signed off. Podiatry cleared the patient form their standpoint to return to [facility]. Podiatry removed RLE wound VAC 3/4.     Please see above list of diagnoses and related plan for additional information.     Condition at Discharge: stable    Discharge Day Visit / Exam:   Subjective:    Ms. Cedeno dneies complaint and wants to go back to [facility] at 2pm today. Denies fever, chills, CP, SOB, abdominal pain. Pain controlled    Vitals: Blood Pressure: 115/53 (03/05/24 0700)  Pulse: 68 (03/05/24 0700)  Temperature: 98 °F (36.7 °C) (03/05/24 0700)  Temp Source: Oral (03/05/24 0700)  Respirations: 15 (03/04/24 2300)  Height: 5' 1\" (154.9 cm) (02/27/24 1836)  Weight - Scale: 69.4 kg (153 lb) (02/27/24 1836)  SpO2: 94 % (03/05/24 0700)  Exam:   Physical Exam  Vitals reviewed.   Constitutional:       Comments: Patient seen sitting comfortably resting, NAD. Also d/w primary RN   Cardiovascular:      Rate and Rhythm: Normal rate and regular rhythm.   Pulmonary:      Effort: Pulmonary effort is normal. No respiratory distress.      Breath sounds: Normal breath sounds.   Abdominal:      General: Bowel sounds are normal.      Palpations: Abdomen is soft.      Tenderness: There is no abdominal tenderness.   Musculoskeletal:      Comments: Right foot boot   Neurological:      Mental Status: She is alert and oriented to person, place, and time.   Psychiatric:         Mood and Affect: Mood normal.         Behavior: Behavior normal.      "     Discussion with Family: Patient declined call to .     Discharge instructions/Information to patient and family:   See after visit summary for information provided to patient and family.      Provisions for Follow-Up Care:  See after visit summary for information related to follow-up care and any pertinent home health orders.      Mobility at time of Discharge:   Basic Mobility Inpatient Raw Score: 15  JH-HLM Goal: 4: Move to chair/commode  JH-HLM Achieved: 4: Move to chair/commode  HLM Goal achieved. Continue to encourage appropriate mobility.     Disposition:   Acute Rehab at [facility]    Planned Readmission: None     Discharge Statement:  I spent 43 minutes discharging the patient. This time was spent on the day of discharge. I had direct contact with the patient on the day of discharge. Greater than 50% of the total time was spent examining patient, answering all patient questions, arranging and discussing plan of care with patient as well as directly providing post-discharge instructions.  Additional time then spent on discharge activities.    Discharge Medications:  See after visit summary for reconciled discharge medications provided to patient and/or family.      **Please Note: This note may have been constructed using a voice recognition system**

## 2024-03-05 NOTE — PLAN OF CARE
Problem: PAIN - ADULT  Goal: Verbalizes/displays adequate comfort level or baseline comfort level  Description: Interventions:  - Encourage patient to monitor pain and request assistance  - Assess pain using appropriate pain scale  - Administer analgesics based on type and severity of pain and evaluate response  - Implement non-pharmacological measures as appropriate and evaluate response  - Consider cultural and social influences on pain and pain management  - Notify physician/advanced practitioner if interventions unsuccessful or patient reports new pain  Outcome: Progressing     Problem: INFECTION - ADULT  Goal: Absence or prevention of progression during hospitalization  Description: INTERVENTIONS:  - Assess and monitor for signs and symptoms of infection  - Monitor lab/diagnostic results  - Monitor all insertion sites, i.e. indwelling lines, tubes, and drains  - Monitor endotracheal if appropriate and nasal secretions for changes in amount and color  - [place] appropriate cooling/warming therapies per order  - Administer medications as ordered  - Instruct and encourage patient and family to use good hand hygiene technique  - Identify and instruct in appropriate isolation precautions for identified infection/condition  Outcome: Progressing  Goal: Absence of fever/infection during neutropenic period  Description: INTERVENTIONS:  - Monitor WBC    Outcome: Progressing     Problem: SAFETY ADULT  Goal: Patient will remain free of falls  Description: INTERVENTIONS:  - Educate patient/family on patient safety including physical limitations  - Instruct patient to call for assistance with activity   - Consult OT/PT to assist with strengthening/mobility   - Keep Call bell within reach  - Keep bed low and locked with side rails adjusted as appropriate  - Keep care items and personal belongings within reach  - Initiate and maintain comfort rounds  - Make Fall Risk Sign visible to staff  - Apply yellow socks and bracelet  for high fall risk patients  - Consider moving patient to room near nurses station  Outcome: Progressing  Goal: Maintain or return to baseline ADL function  Description: INTERVENTIONS:  -  Assess patient's ability to carry out ADLs; assess patient's baseline for ADL function and identify physical deficits which impact ability to perform ADLs (bathing, care of mouth/teeth, toileting, grooming, dressing, etc.)  - Assess/evaluate cause of self-care deficits   - Assess range of motion  - Assess patient's mobility; develop plan if impaired  - Assess patient's need for assistive devices and provide as appropriate  - Encourage maximum independence but intervene and supervise when necessary  - Involve family in performance of ADLs  - Assess for home care needs following discharge   - Consider OT consult to assist with ADL evaluation and planning for discharge  - Provide patient education as appropriate  Outcome: Progressing  Goal: Maintains/Returns to pre admission functional level  Description: INTERVENTIONS:  - Perform AM-PAC 6 Click Basic Mobility/ Daily Activity assessment daily.  - Set and communicate daily mobility goal to care team and patient/family/caregiver.   - Collaborate with rehabilitation services on mobility goals if consulted  - Perform Range of Motion 4 times a day.  - Reposition patient every 2 hours.  - Dangle patient 4 times a day  - Stand patient 3 times a day  - Ambulate patient 3 times a day  - Out of bed to chair 3 times a day   - Out of bed for meals 3 times a day  - Out of bed for toileting  - Record patient progress and toleration of activity level   Outcome: Progressing     Problem: DISCHARGE PLANNING  Goal: Discharge to home or other facility with appropriate resources  Description: INTERVENTIONS:  - Identify barriers to discharge w/patient and caregiver  - Arrange for needed discharge resources and transportation as appropriate  - Identify discharge learning needs (meds, wound care, etc.)  -  Arrange for interpretive services to assist at discharge as needed  - Refer to Case Management Department for coordinating discharge planning if the patient needs post-hospital services based on physician/advanced practitioner order or complex needs related to functional status, cognitive ability, or social support system  Outcome: Progressing     Problem: Knowledge Deficit  Goal: Patient/family/caregiver demonstrates understanding of disease process, treatment plan, medications, and discharge instructions  Description: Complete learning assessment and assess knowledge base.  Interventions:  - Provide teaching at level of understanding  - Provide teaching via preferred learning methods  Outcome: Progressing     Problem: Prexisting or High Potential for Compromised Skin Integrity  Goal: Skin integrity is maintained or improved  Description: INTERVENTIONS:  - Identify patients at risk for skin breakdown  - Assess and monitor skin integrity  - Assess and monitor nutrition and hydration status  - Monitor labs   - Assess for incontinence   - Turn and reposition patient  - Assist with mobility/ambulation  - Relieve pressure over bony prominences  - Avoid friction and shearing  - Provide appropriate hygiene as needed including keeping skin clean and dry  - Evaluate need for skin moisturizer/barrier cream  - Collaborate with interdisciplinary team   - Patient/family teaching  - Consider wound care consult   Outcome: Progressing     Problem: MOBILITY - ADULT  Goal: Maintain or return to baseline ADL function  Description: INTERVENTIONS:  -  Assess patient's ability to carry out ADLs; assess patient's baseline for ADL function and identify physical deficits which impact ability to perform ADLs (bathing, care of mouth/teeth, toileting, grooming, dressing, etc.)  - Assess/evaluate cause of self-care deficits   - Assess range of motion  - Assess patient's mobility; develop plan if impaired  - Assess patient's need for assistive  devices and provide as appropriate  - Encourage maximum independence but intervene and supervise when necessary  - Involve family in performance of ADLs  - Assess for home care needs following discharge   - Consider OT consult to assist with ADL evaluation and planning for discharge  - Provide patient education as appropriate  Outcome: Progressing  Goal: Maintains/Returns to pre admission functional level  Description: INTERVENTIONS:  - Perform AM-PAC 6 Click Basic Mobility/ Daily Activity assessment daily.  - Set and communicate daily mobility goal to care team and patient/family/caregiver.   - Collaborate with rehabilitation services on mobility goals if consulted  - Perform Range of Motion 4 times a day.  - Reposition patient every 2 hours.  - Dangle patient 4 times a day  - Stand patient 3 times a day  - Ambulate patient 3 times a day  - Out of bed to chair 3 times a day   - Out of bed for meals 3 times a day  - Out of bed for toileting  - Record patient progress and toleration of activity level   Outcome: Progressing

## 2024-03-05 NOTE — CONSULTS
Podiatry has been following while patient has been transferred between [facility] and [facility]. Patient was evaluated yesterday on 3/4/24. Please see most recent progress note.    Podiatry to plan for next evaluation on 3/7/24 for dressing change.

## 2024-03-05 NOTE — CONSULTS
PHYSICAL MEDICINE AND REHABILITATION CONSULT NOTE  Lona Cedeno 78 y.o. female MRN: [id number]  Unit/Bed#: [facility] 974-01 Encounter: [id number]     Rehab Diagnosis: Impairment of mobility, safety and Activities of Daily Living (ADLs) due to Other Disabling Impairments:  13  Other Disabling Impairments    Etiologic: Diabetic foot ulcer with osteomyelitis s/p fem bypass, right foot wound and achilles debridement with 2nd toe partial amputation and now s/p STSG   Date of Onset: 2/26/24     Date of surgery: 1/31, 2/4, 2/14, 2/28    History of Present Illness:   Lona Cedeno is a 78 y.o. female with history of anxiety, closed fracture of the coccyx in May 2023, diabetes, GERD, hyperlipidemia, hypertension, IBS who presented to the [facility] on 1/23 from the podiatry office with a diabetic ulcer of the right toe and right ankle with exposed Achilles tendon.  She was stabbed have osteomyelitis of the right foot transferred to [facility] to reconsider vascularization and surgical options.  She is recommended to undergo an extra-anatomic bypass.  There was discussion about right transtibial amputation however decision was made for attempted limb salvage.  She underwent the above bypass on 1/31/2024 as well as on 2/4/2024 and underwent right toe and heel wound debridement with cycle and partial amputation of the toe and VAC placement.  Her course was complicated by arrest chest wall hematoma s/p evacuation on 2/4 with vascular.  On 2/13 the patient needed discharge back to acute care in the setting of fevers and leukocytosis from a potential infectious standpoint.  She was also given a discharge from the [facility] to acute care setting after podiatry wanted to take the patient back for split thickness skin grafting which occurred on 2/28 with Dr. Yeboah.  Patient was transition to oral antibiotics and wound vacs have been removed.  She remains nonweightbearing to the right lower extremity.  The patient was evaluated by the Rehabilitation team and deemed an appropriate candidate for comprehensive inpatient rehabilitation and admitted to the [facility] on 3/5/2024  2:03 PM      Plan:     Rehabilitation  Functional deficits: impaired mobility, self care  Begin PT/OT/SLP.  Rehabilitation goals are to achieve a modified independent level with mobility and self care.  Prognosis is fair to good.  ELOS is 10 to 14 days.  Estimated discharge is home.     DVT prophylaxis  On Xarelto    Pain  Acetaminophen 975 mg every 8 hours  Lyrica 200 mg 3 times daily  Robaxin 250 mg every 8 hours  Oxycodone 2.5-5 mg every 4 hours as needed    Bladder plan  Continent    Bowel plan  Continent  Last bowel movement on 3/5    Code Status  Level 3 DNAR/DNI      Surgical site infection  Assessment & Plan  Needed treatment with cefepime for surgical site infection however also on vancomycin which was discontinued on 2/16  Right groin breakdown status post wound washout with VAC placement on 2/14 as well as 2/19  Was followed by infectious disease and continued on cefepime with plan to transition to cefpodoxime 400 mg twice daily for 4 weeks.  Cultures were positive for Klebsiella and Proteus  VAC to the right groin as well as the foot have since been discontinued and continues for cefpodoxime for antibiotic treatment  Maintain nonweightbearing to the right lower extremity  Underwent split thickness skin grafting with podiatry on 2/28 for the right foot  Due to the surgical site infection, sepsis and requirement for multiple trips to the OR for wound debridement, VAC placement, split-thickness grafting patient is not at her functional baseline and would benefit from daily physician oversight and rehabilitation nursing as well as aggressive physical and occupational therapy under guidance of physical medicine and rehabilitation specialist.  Patient will tolerate this 3 hours/day 5 to 7 days/week with ultimate discharge goal for the  community    Impulsive  Assessment & Plan  Had impulsive behaviors during prior admissions and discussed overall behavioral plan.  Patient on last admission was impulsive and not using call bell appropriately as well as not following weightbearing restrictions  Discussed with patient again however continued vigilance with nursing and therapy checks, alarms and relatively sensitive settings and would benefit from Q 2-hour bowel bladder checks    Sacral wound  Assessment & Plan  Unstageable wound on the sacrum on initial evaluations, POA  Consult wound care for management  Vigilant turns in bed and weight shifts, daily sacral checks by nursing    Acute pain due to injury  Assessment & Plan  Acetaminophen 975 mg every 8 hours  Lyrica 200 mg 3 times daily  Robaxin 250 mg every 8 hours  Oxycodone 2.5-5 mg every 4 hours as needed    Impaired mobility and activities of daily living  Assessment & Plan  - Rehabilitation medicine physician for daily monitoring of care, 24 hour availability for acute  medical issues, medication management, and therapeutic and diagnostic assessments.  - 24 hour rehabilitation nursing 7 days per week for: management/teaching of medications,  bowel/bladder routine, skin care.  - PT, OT for 2-3 hours per day, 5 to 6 days per week; 15 hours per week  - Rehabilitation Psychology for adjustment and coping  - MSW for barriers to discharge, community resources, and family support  - Discharge planning following to help ensure a safe and efficient discharge    Anemia  Assessment & Plan  Most recent hemoglobin 11.6 which is just within the normal range but has been overall improving  Continue to follow biweekly labs or sooner if clinically indicated as patient is overall at risk for drop in hemoglobin  On review of the last 2 weeks of labs has improved slightly from the mid to upper 10s  Continue B12 supplementation    Diabetic foot ulcer with osteomyelitis (HCC)  Assessment & Plan  Patient presented  with right posterior ankle ulceration with exposed Achilles tendon and required foot debridement on 2/4/2024 with VAC placement with right medial ankle ulcer, right second toe osteomyelitis status post right second toe amputation  Wound vacs have since been discontinued and continues to be followed by podiatry  Require going back to the OR on 2/28 for split-thickness grafting  Remains nonweightbearing to the right lower extremity      Tobacco use disorder  Assessment & Plan  Smoking cessation counseling  Had been on nicotine patch 21 mg / 24-hour, however decreased to 14 mg    COPD, severity to be determined ([facility])  Assessment & Plan  Continue inhalers per internal medicine including albuterol as needed as well as fluticasone-vilanterol 1 puff daily  Monitor oxygen saturations and therapy    PAD (peripheral artery disease) ([facility])  Assessment & Plan  Patient was on aspirin, statin and low-dose Xarelto 2.5 mg twice daily  Status post 1/31 bypass axillary-femoral, right with 8 mm ringed PTFE graft    GERD without esophagitis  Assessment & Plan  Continue Protonix    Anxiety and depression  Assessment & Plan  Continue Wellbutrin  mg daily as well as Klonopin  Supportive counseling and consider neuropsychology if indicated and patient agreeable    Essential hypertension  Assessment & Plan  Monitor pressures in therapy however has been off of regular scheduled antihypertensives  Ordered hydralazine as needed for systolic blood pressure greater than 160    Type 2 diabetes mellitus with diabetic polyneuropathy ([facility])  Assessment & Plan  Lab Results   Component Value Date    HGBA1C 6.0 (H) 02/28/2024       Recent Labs     03/04/24  1651 03/04/24  2044 03/05/24  0716 03/05/24  1159   POCGLU 153* 152* 139 107     Has been on metformin as an outpatient but held while inpatient and currently on a sliding scale regimen.  Monitor per internal medicine and make adjustments as needed  Glycemic control has been good we will need to  continue to for appropriate wound healing potential              Subjective/Interval Events:       Review of Systems   Constitutional:  Negative for chills and fever.   HENT:  Negative for hearing loss and trouble swallowing.    Eyes:  Negative for photophobia and visual disturbance.   Respiratory:  Negative for cough and shortness of breath.    Cardiovascular:  Positive for leg swelling. Negative for chest pain.   Gastrointestinal:  Negative for constipation, diarrhea, nausea and vomiting.   Endocrine: Negative for cold intolerance and heat intolerance.   Genitourinary:  Negative for dysuria and hematuria.   Musculoskeletal:  Negative for back pain and neck pain.   Skin:  Negative for rash and wound.   Allergic/Immunologic: Negative for environmental allergies and food allergies.   Neurological:  Positive for weakness. Negative for dizziness and light-headedness.   Hematological:  Negative for adenopathy. Does not bruise/bleed easily.   Psychiatric/Behavioral:  Negative for dysphoric mood and sleep disturbance.          Function:  PRIOR LEVEL OF FUNCTION:  She lives in a(n) single family home  Lona Cedeno is  and lives with their family.  Self Care: Independent, Indoor Mobility: Independent, Stairs (in/outdoor): Independent, and Cognition: Independent     HOME ENVIRONMENT:  The living area: can live on one level  There are 5 steps to enter the home.    The patient will not have 24 hour supervision/physical assistance available upon discharge    Current level of function:  Physical Therapy: Supervision for bed mobility min assist for transfers and moderate assist for 6 feet with rolling walker  Occupational Therapy: Supervision for grooming upper body ADLs sometimes min assist for upper body dressing and requiring min to max for lower body ADLs and toileting    Physical Exam:  /54 (BP Location: Left arm)   Pulse 77   Temp 97.5 °F (36.4 °C) (Oral)   Resp 16   SpO2 92%      No intake or output data  in the 24 hours ending 03/05/24 1657    There is no height or weight on file to calculate BMI.      Physical Exam  Vitals reviewed.   Constitutional:       General: She is not in acute distress.  HENT:      Head: Normocephalic and atraumatic.      Right Ear: External ear normal.      Left Ear: External ear normal.      Nose: Nose normal. No rhinorrhea.      Mouth/Throat:      Mouth: Mucous membranes are moist.      Pharynx: Oropharynx is clear.   Eyes:      General: No scleral icterus.  Cardiovascular:      Rate and Rhythm: Normal rate.   Pulmonary:      Effort: Pulmonary effort is normal. No respiratory distress.   Abdominal:      General: There is no distension.      Palpations: Abdomen is soft.   Musculoskeletal:      Cervical back: Normal range of motion.      Right lower leg: Edema present.      Left lower leg: No edema.      Comments: Ace wrap to right lower extremity with Prevalon boot in place   Skin:     General: Skin is warm and dry.   Neurological:      Mental Status: She is alert and oriented to person, place, and time.      Motor: No weakness.   Psychiatric:         Mood and Affect: Mood normal.         Behavior: Behavior normal.            Labs, medications, and imaging personally reviewed.    Laboratory:    Lab Results   Component Value Date    SODIUM 137 03/04/2024    K 4.1 03/04/2024     03/04/2024    CO2 30 03/04/2024    BUN 21 03/04/2024    CREATININE 0.47 (L) 03/04/2024    GLUC 161 (H) 03/04/2024    CALCIUM 9.3 03/04/2024     Lab Results   Component Value Date    WBC 9.33 03/04/2024    HGB 11.6 03/04/2024    HCT 37.9 03/04/2024     (H) 03/04/2024     03/04/2024     Lab Results   Component Value Date    INR 1.16 02/14/2024    INR 1.03 02/04/2024    INR 1.11 02/03/2024    PROTIME 14.7 (H) 02/14/2024    PROTIME 13.4 02/04/2024    PROTIME 14.2 02/03/2024         Current Facility-Administered Medications:     acetaminophen (TYLENOL) tablet 975 mg, 975 mg, Oral, Q8H Vanesa WALSH  ROXANE Sheehan    albuterol (PROVENTIL HFA,VENTOLIN HFA) inhaler 2 puff, 2 puff, Inhalation, Q6H PRN, ROXANE Bear    [START ON 3/6/2024] aspirin chewable tablet 81 mg, 81 mg, Oral, Daily, ROXANE Bear    [START ON 3/6/2024] buPROPion (WELLBUTRIN XL) 24 hr tablet 300 mg, 300 mg, Oral, Daily, ROXANE Bear    cefpodoxime (VANTIN) tablet 400 mg, 400 mg, Oral, BID With Meals, ROXANE Bear, 400 mg at 03/05/24 1644    clonazePAM (KlonoPIN) tablet 1 mg, 1 mg, Oral, QPM, ROXANE Bear    [START ON 3/6/2024] cyanocobalamin (VITAMIN B-12) tablet 1,000 mcg, 1,000 mcg, Oral, Daily, ROXANE Bear    [START ON 3/6/2024] fish oil capsule 1,000 mg, 1,000 mg, Oral, Daily, ROXANE Bear    [START ON 3/6/2024] fluticasone-vilanterol 200-25 mcg/actuation 1 puff, 1 puff, Inhalation, Daily, ROXANE Bear    hydrALAZINE (APRESOLINE) tablet 25 mg, 25 mg, Oral, Q8H PRN, ROXANE Bear    insulin lispro (HumALOG/ADMELOG) 100 units/mL subcutaneous injection 1-5 Units, 1-5 Units, Subcutaneous, TID AC **AND** Fingerstick Glucose (POCT), , , TID AC, ROXANE Bear    insulin lispro (HumALOG/ADMELOG) 100 units/mL subcutaneous injection 1-5 Units, 1-5 Units, Subcutaneous, HS, ROXANE Bear    methocarbamol (ROBAXIN) tablet 250 mg, 250 mg, Oral, Q8H JILLIAN, ROXANE Bear    [START ON 3/6/2024] multivitamin-minerals (CENTRUM) tablet 1 tablet, 1 tablet, Oral, Daily, ROXANE Bear    [START ON 3/6/2024] nicotine (NICODERM CQ) 14 mg/24hr TD 24 hr patch 1 patch, 1 patch, Transdermal, Daily, Vanesa SheehanFAUSTONP    oxyCODONE (ROXICODONE) split tablet 2.5 mg, 2.5 mg, Oral, Q4H PRN **OR** oxyCODONE (ROXICODONE) IR tablet 5 mg, 5 mg, Oral, Q4H PRN, Vanesa FAUSTO SheehanNP    [START ON 3/6/2024] pantoprazole (PROTONIX) EC tablet 40 mg, 40 mg, Oral, Early Morning, Vanesa Sheehan CRNP    [START ON 3/6/2024] polyethylene glycol (MIRALAX) packet 17 g, 17 g, Oral, Daily, FAUSTO BearNP    pregabalin (LYRICA)  capsule 200 mg, 200 mg, Oral, TID, Vanesa Sheehan, CRNP, 200 mg at 03/05/24 1644    rivaroxaban (XARELTO) tablet 2.5 mg, 2.5 mg, Oral, BID, Vanesa Sheehan, CRNP    senna-docusate sodium (SENOKOT S) 8.6-50 mg per tablet 1 tablet, 1 tablet, Oral, BID, Vanesa Sheehan, CRNP  Allergies   Allergen Reactions    Paroxetine Other (See Comments)     Became suicidal    Adhesive [Medical Tape] Itching and Blisters    Carafate [Sucralfate] Other (See Comments)     Mouth sores, tongue sore    Sulfa Antibiotics Hives and Rash    Sulfamethoxazole-Trimethoprim Hives      Patient Active Problem List    Diagnosis Date Noted    Surgical site infection 02/13/2024    Sacral wound 02/23/2024    Impulsive 02/23/2024    Other dietary vitamin B12 deficiency anemia 02/16/2024    Leukocytosis 02/13/2024    Sepsis without acute organ dysfunction (HCC) 02/13/2024    At risk for venous thromboembolism (VTE) 02/09/2024    At high risk for skin breakdown 02/09/2024    Wound of skin 02/09/2024    Impaired mobility and activities of daily living 02/09/2024    Acute pain due to injury 02/09/2024    Hematoma 02/06/2024    Anemia 02/06/2024    Constipation 01/26/2024    Preoperative cardiovascular examination 01/25/2024    Diabetic foot ulcer with osteomyelitis ([facility]) 01/23/2024    Ankle ulcer, right, with fat layer exposed ([facility]) 12/14/2023    Age-related osteoporosis without current pathological fracture 11/28/2023    Abnormal CT of the chest 10/17/2023    COPD, severity to be determined ([facility]) 10/17/2023    Tobacco use disorder 10/17/2023    PAD (peripheral artery disease) ([facility]) 10/10/2023    Bladder neoplasm 09/11/2023    Left renal mass 09/11/2023    Lactic acidosis 08/11/2023    Hypomagnesemia 08/11/2023    Degenerative lumbar disc 05/24/2023    Urinary incontinence 04/13/2023    GERD without esophagitis 10/16/2019    Essential hypertension 02/15/2019    Anxiety and depression 02/15/2019    Type 2 diabetes mellitus with diabetic polyneuropathy ([facility])  2019     Past Medical History:   Diagnosis Date    Anxiety     Closed fracture of coccyx (HCC) 2023    Diabetes mellitus (HCC)     GERD (gastroesophageal reflux disease)     Hyperlipidemia     Hypertension     IBS (irritable bowel syndrome)     Shoulder fracture      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;  Surgeon: Cristian Child MD;  Location: [facility];  Service: Urology    EVACUATION OF HEMATOMA Right 2024    Procedure: EVACUATION/ DRAINAGE CHEST WALL  HEMATOMA;  Surgeon: Seth Hoyos MD;  Location: [facility];  Service: Vascular    FL RETROGRADE PYELOGRAM  2023    HERNIA REPAIR      umbilicus w/ mesh    OR BYPASS W/VEIN AXILLARY-FEMORAL Right 2024    Procedure: BYPASS AXILLO-FEMORAL, RIGHT WITH 8MM RINGED PTFE GRAFT;  Surgeon: Seth Hoyos MD;  Location: [facility];  Service: Vascular    SPLIT THICKNESS SKIN GRAFT Right 2024    Procedure: SKIN GRAFT SPLIT THICKNESS (STSG)  EXTREMITY, Wound vac dressing change;  Surgeon: Rigo Yeboah DPM;  Location: [facility];  Service: Podiatry    WOUND DEBRIDEMENT Right 2024    Procedure: RIGHT WOUND AND ACHILLES DEBRIDEMENT FOOT/TOE (WASH OUT); PARTIAL AMPUTATION DISTAL 2ND TOE;  Surgeon: Aaron Montero DPM;  Location: [facility];  Service: Podiatry    WOUND DEBRIDEMENT Right 2024    Procedure: DEBRIDEMENT RIGHT GROIN  WOUND AND WASHOUT, APPLICATION OF WOUND VAC;  Surgeon: Suly Muro DO;  Location: [facility];  Service:  Vascular    WOUND DEBRIDEMENT Right 2/19/2024    Procedure: DEBRIDEMENT LOWER EXTREMITY, washout;  Surgeon: Suly Muro DO;  Location: [facility];  Service: Vascular     Social History     Socioeconomic History    Marital status: /Civil Union     Spouse name: Not on file    Number of children: Not on file    Years of education: Not on file    Highest education level: Not on file   Occupational History    Not on file   Tobacco Use    Smoking status: Every Day     Current packs/day: 1.00     Average packs/day: 1 pack/day for 63.2 years (63.2 ttl pk-yrs)     Types: Cigarettes     Start date: 1961    Smokeless tobacco: Never    Tobacco comments:     11/14/23 smokes about 3/4 PPD   Vaping Use    Vaping status: Never Used   Substance and Sexual Activity    Alcohol use: Not Currently    Drug use: Never    Sexual activity: Not Currently   Other Topics Concern    Not on file   Social History Narrative    Not on file     Social Determinants of Health     Financial Resource Strain: Low Risk  (2/9/2023)    Overall Financial Resource Strain (CARDIA)     Difficulty of Paying Living Expenses: Not hard at all   Food Insecurity: No Food Insecurity (3/5/2024)    Hunger Vital Sign     Worried About Running Out of Food in the Last Year: Never true     Ran Out of Food in the Last Year: Never true   Transportation Needs: No Transportation Needs (3/5/2024)    PRAPARE - Transportation     Lack of Transportation (Medical): No     Lack of Transportation (Non-Medical): No   Physical Activity: Not on file   Stress: Not on file   Social Connections: Not on file   Intimate Partner Violence: Not on file   Housing Stability: Low Risk  (3/5/2024)    Housing Stability Vital Sign     Unable to Pay for Housing in the Last Year: No     Number of Places Lived in the Last Year: 1     Unstable Housing in the Last Year: No     Social History     Tobacco Use   Smoking Status Every Day    Current packs/day: 1.00    Average packs/day: 1 pack/day  for 63.2 years (63.2 ttl pk-yrs)    Types: Cigarettes    Start date: 1961   Smokeless Tobacco Never   Tobacco Comments    11/14/23 smokes about 3/4 PPD     Social History     Substance and Sexual Activity   Alcohol Use Not Currently     Family History   Problem Relation Age of Onset    COPD Mother     Emphysema Mother     COPD Father     Emphysema Father          Medical Necessity Criteria for [facility] Admission: Anemia, with the following plan: monitor H/H, Hypertension, Bowel/Bladder Management, Diabetes requiring close blood glucose monitoring, Incision/Wound care, Positive wound culture, and Pressure sore/Skin Tear. In addition, the preadmission screen, post-admission physical evaluation, overall plan of care and admissions order demonstrate a reasonable expectation that the following criteria were met at the time of admission to the [facility].  The patient requires active and ongoing therapeutic intervention of multiple therapy disciplines (physical therapy, occupational therapy, speech-language pathology, or prosthetics/orthotics), one of which is physical or occupational therapy.    Patient requires an intensive rehabilitation therapy program, as defined in Chapter 1, section 110.2.2 of the CMS Medicare Policy Manual. This intensive rehabilitation therapy program will consist of at least 3 hours of therapy per day at least 5 days per week or at least 15 hours of intensive rehabilitation therapy within a 7 consecutive day period, beginning with the date of admission to the [facility].    The patient is reasonably expected to actively participate in, and benefit significantly from, the intensive rehabilitation therapy program as defined in Chapter 1, section 110.2.2 of the CMS Medicare Policy Manual at this time of admission to the [facility]. She can reasonably be expected to make measurable improvement (that will be of practical value to improve the patient’s functional capacity or adaptation to impairments) as a result of the  rehabilitation treatment, as defined in section 110.3, and such improvement can be expected to be made within the prescribed period of time. As noted in the CMS Medicare Policy Manual, the patient need not be expected to achieve complete independence in the domain of self-care nor be expected to return to his or her prior level of functioning in order to meet this standard.  The patient must require physician supervision by a rehabilitation physician. As such, a rehabilitation physician will conduct face-to-face visits with the patient at least 3 days per week throughout the patient’s stay in the [facility] to assess the patient both medically and functionally, as well as to modify the course of treatment as needed to maximize the patient’s capacity to benefit from the rehabilitation process.  The patient requires an intensive and coordinated interdisciplinary approach to providing rehabilitation, as defined in Chapter 1, section 110.2.5 of the CMS Medicare Policy Manual. This will be achieved through periodic team conferences, conducted at least once in a 7-day period, and comprising of an interdisciplinary team of medical professionals consisting of: a rehabilitation physician, registered nurse,  and/or , and a licensed/certified therapist from each therapy discipline involved in treating the patient.     Changes Since Pre-admission Assessment: None -This patient's participation in rehab continues to be reasonable, necessary and appropriate.    CMS Required Post-Admission Physician Evaluation Elements  History and Physical, including medical history, functional history and active comorbidities as in above text.     Post-Admission Physician Evaluation:  The patient has the potential to make improvement and is in need of physical, occupational, and/or therapy services. The patient may also need nutritional services. Given the patient's complex medical condition and risk of further medical  complications, rehabilitative services cannot be safely provided at a lower level of care, such as a skilled nursing facility. I have reviewed the patient's functional and medical status at the time of the preadmission screening and they are the same as on the day of this admission. I acknowledge that I have personally performed a full physical examination on this patient within 24 hours of admission. The patient and/or family demonstrated understanding the rehabilitation program and the discharge process after we discussed them.     Agree in entirety: yes  Minor adaptions: none    Major changes: none    Rigo Hoover, DO  Physical Medicine and Rehabilitation  [facility]    I have spent a total time of 75 minutes on 03/05/24 in caring for this patient including Risks and benefits of tx options, Importance of tx compliance, Risk factor reductions, Counseling / Coordination of care, Documenting in the medical record, Reviewing / ordering tests, medicine, procedures  , Obtaining or reviewing history  , and Communicating with other healthcare professionals .      ** Please Note: Fluency Direct voice to text software may have been used in the creation of this document. **

## 2024-03-05 NOTE — PCC CARE MANAGEMENT
Pt is making good progress and scheduled to return home on Thursday. Arrangements in place for continued nursing and pt and ot at home through [facility]. Wheelchair ordered for pts functional safety at home. Following for additional dc need.s

## 2024-03-05 NOTE — PLAN OF CARE
Problem: SAFETY ADULT  Goal: Patient will remain free of falls  Description: INTERVENTIONS:  - Educate patient/family on patient safety including physical limitations  - Instruct patient to call for assistance with activity   - Consult OT/PT to assist with strengthening/mobility   - Keep Call bell within reach  - Keep bed low and locked with side rails adjusted as appropriate  - Keep care items and personal belongings within reach  - Initiate and maintain comfort rounds  - Make Fall Risk Sign visible to staff  - Offer Toileting every 2 Hours, in advance of need  - Initiate/Maintain bed alarm  - Obtain necessary fall risk management equipment:   - Apply yellow socks and bracelet for high fall risk patients  - Consider moving patient to room near nurses station  Outcome: Progressing

## 2024-03-05 NOTE — ASSESSMENT & PLAN NOTE
Lab Results   Component Value Date    HGBA1C 6.0 (H) 02/28/2024       Recent Labs     03/04/24  1056 03/04/24  1651 03/04/24 2044 03/05/24  0716   POCGLU 196* 153* 152* 139         Blood Sugar Average: Last 72 hrs:  (P) 152.7720935884869113  Continue SSI  Accu-Cheks reviewed  Continue Lyrica

## 2024-03-05 NOTE — ASSESSMENT & PLAN NOTE
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 Podiatry removed RLE wound VAC 3/4, Podiatry to continue to follow while patient in [facility] for local wound care

## 2024-03-05 NOTE — ASSESSMENT & PLAN NOTE
Right posterior ankle ulcer with exposed Achilles tendon s/p wound debridement on 2/4/24  Osteomyelitis of right second toe s/p amputation on 2/4/24  Transferred from [facility] for STSG. S/p STSG application on 2/28/24 by Podiatry  Podiatry removed RLE wound VAC 3/4/24, Podiatry to continue to follow while patient in [facility] for local wound care, Patient is stable from podiatry standpoint to be transferred to [facility], no future surgical plans.

## 2024-03-06 ENCOUNTER — TRANSITIONAL CARE MANAGEMENT (OUTPATIENT)
Dept: FAMILY MEDICINE CLINIC | Facility: CLINIC | Age: 79
End: 2024-03-06

## 2024-03-06 LAB
GLUCOSE SERPL-MCNC: 140 MG/DL (ref 65–140)
GLUCOSE SERPL-MCNC: 143 MG/DL (ref 65–140)
GLUCOSE SERPL-MCNC: 154 MG/DL (ref 65–140)
GLUCOSE SERPL-MCNC: 79 MG/DL (ref 65–140)

## 2024-03-06 PROCEDURE — 97535 SELF CARE MNGMENT TRAINING: CPT

## 2024-03-06 PROCEDURE — 97166 OT EVAL MOD COMPLEX 45 MIN: CPT

## 2024-03-06 PROCEDURE — 97530 THERAPEUTIC ACTIVITIES: CPT

## 2024-03-06 PROCEDURE — 97163 PT EVAL HIGH COMPLEX 45 MIN: CPT

## 2024-03-06 PROCEDURE — 97110 THERAPEUTIC EXERCISES: CPT

## 2024-03-06 PROCEDURE — 99232 SBSQ HOSP IP/OBS MODERATE 35: CPT | Performed by: INTERNAL MEDICINE

## 2024-03-06 PROCEDURE — 82948 REAGENT STRIP/BLOOD GLUCOSE: CPT

## 2024-03-06 PROCEDURE — 99233 SBSQ HOSP IP/OBS HIGH 50: CPT | Performed by: STUDENT IN AN ORGANIZED HEALTH CARE EDUCATION/TRAINING PROGRAM

## 2024-03-06 RX ADMIN — CEFPODOXIME PROXETIL 400 MG: 200 TABLET, FILM COATED ORAL at 08:01

## 2024-03-06 RX ADMIN — ACETAMINOPHEN 975 MG: 325 TABLET, FILM COATED ORAL at 13:18

## 2024-03-06 RX ADMIN — RIVAROXABAN 2.5 MG: 2.5 TABLET, FILM COATED ORAL at 16:53

## 2024-03-06 RX ADMIN — CEFPODOXIME PROXETIL 400 MG: 200 TABLET, FILM COATED ORAL at 16:54

## 2024-03-06 RX ADMIN — Medication 1 TABLET: at 08:01

## 2024-03-06 RX ADMIN — PREGABALIN 200 MG: 100 CAPSULE ORAL at 16:51

## 2024-03-06 RX ADMIN — SENNOSIDES, DOCUSATE SODIUM 1 TABLET: 8.6; 5 TABLET ORAL at 16:52

## 2024-03-06 RX ADMIN — NICOTINE 1 PATCH: 14 PATCH, EXTENDED RELEASE TRANSDERMAL at 08:05

## 2024-03-06 RX ADMIN — BUPROPION HYDROCHLORIDE 300 MG: 150 TABLET, FILM COATED, EXTENDED RELEASE ORAL at 08:07

## 2024-03-06 RX ADMIN — PANTOPRAZOLE SODIUM 40 MG: 40 TABLET, DELAYED RELEASE ORAL at 05:24

## 2024-03-06 RX ADMIN — CYANOCOBALAMIN TAB 500 MCG 1000 MCG: 500 TAB at 08:03

## 2024-03-06 RX ADMIN — FLUTICASONE FUROATE AND VILANTEROL TRIFENATATE 1 PUFF: 200; 25 POWDER RESPIRATORY (INHALATION) at 08:10

## 2024-03-06 RX ADMIN — ACETAMINOPHEN 975 MG: 325 TABLET, FILM COATED ORAL at 05:25

## 2024-03-06 RX ADMIN — METHOCARBAMOL 250 MG: 500 TABLET ORAL at 05:26

## 2024-03-06 RX ADMIN — SENNOSIDES, DOCUSATE SODIUM 1 TABLET: 8.6; 5 TABLET ORAL at 08:02

## 2024-03-06 RX ADMIN — INSULIN LISPRO 1 UNITS: 100 INJECTION, SOLUTION INTRAVENOUS; SUBCUTANEOUS at 08:11

## 2024-03-06 RX ADMIN — RIVAROXABAN 2.5 MG: 2.5 TABLET, FILM COATED ORAL at 08:06

## 2024-03-06 RX ADMIN — OXYCODONE HYDROCHLORIDE 5 MG: 5 TABLET ORAL at 20:14

## 2024-03-06 RX ADMIN — ASPIRIN 81 MG CHEWABLE TABLET 81 MG: 81 TABLET CHEWABLE at 08:03

## 2024-03-06 RX ADMIN — ACETAMINOPHEN 975 MG: 325 TABLET, FILM COATED ORAL at 21:31

## 2024-03-06 RX ADMIN — METFORMIN HYDROCHLORIDE 500 MG: 500 TABLET, FILM COATED ORAL at 16:51

## 2024-03-06 RX ADMIN — PREGABALIN 200 MG: 100 CAPSULE ORAL at 21:32

## 2024-03-06 RX ADMIN — OMEGA-3 FATTY ACIDS CAP 1000 MG 1000 MG: 1000 CAP at 08:07

## 2024-03-06 RX ADMIN — PREGABALIN 200 MG: 100 CAPSULE ORAL at 08:02

## 2024-03-06 RX ADMIN — METHOCARBAMOL 250 MG: 500 TABLET ORAL at 13:18

## 2024-03-06 RX ADMIN — METHOCARBAMOL 250 MG: 500 TABLET ORAL at 21:31

## 2024-03-06 RX ADMIN — CLONAZEPAM 1 MG: 1 TABLET ORAL at 16:52

## 2024-03-06 RX ADMIN — POLYETHYLENE GLYCOL 3350 17 G: 17 POWDER, FOR SOLUTION ORAL at 08:05

## 2024-03-06 NOTE — TEAM CONFERENCE
Acute RehabilitationTeam Conference Note  Date: 3/6/2024   Time: 11:47 AM       Patient Name:  Lona Cedeno       Medical Record Number: [id number]   YOB: 1945  Sex: Female          [facility]  [facility] 974-01  Payor Info:  Payor: [employer]  REP / Plan: [facility] REP / Product Type: Medicare PPO /      Admitting Diagnosis: Diabetic foot ulcer with osteomyelitis (HCC) [E11.621, E11.69, L97.509, M86.9]   Admit Date/Time:  3/5/2024  2:03 PM  Admission Comments: No comment available     Primary Diagnosis:  Surgical site infection  Principal Problem: Surgical site infection    Patient Active Problem List    Diagnosis Date Noted    Sacral wound 02/23/2024    Impulsive 02/23/2024    Other dietary vitamin B12 deficiency anemia 02/16/2024    Leukocytosis 02/13/2024    Surgical site infection 02/13/2024    Sepsis without acute organ dysfunction (HCC) 02/13/2024    At risk for venous thromboembolism (VTE) 02/09/2024    At high risk for skin breakdown 02/09/2024    Wound of skin 02/09/2024    Impaired mobility and activities of daily living 02/09/2024    Acute pain due to injury 02/09/2024    Hematoma 02/06/2024    Anemia 02/06/2024    Constipation 01/26/2024    Preoperative cardiovascular examination 01/25/2024    Diabetic foot ulcer with osteomyelitis (HCC) 01/23/2024    Ankle ulcer, right, with fat layer exposed (HCC) 12/14/2023    Age-related osteoporosis without current pathological fracture 11/28/2023    Abnormal CT of the chest 10/17/2023    COPD, severity to be determined (HCC) 10/17/2023    Tobacco use disorder 10/17/2023    PAD (peripheral artery disease) (HCC) 10/10/2023    Bladder neoplasm 09/11/2023    Left renal mass 09/11/2023    Lactic acidosis 08/11/2023    Hypomagnesemia 08/11/2023    Degenerative lumbar disc 05/24/2023    Urinary incontinence 04/13/2023    GERD without esophagitis 10/16/2019    Essential hypertension 02/15/2019    Anxiety and depression 02/15/2019    Type 2  diabetes mellitus with diabetic polyneuropathy (HCC) 02/12/2019       Physical Therapy:         No notes on file    Occupational Therapy:          OT eval to be completed 03/06/24      Speech Therapy:           No notes on file    Nursing Notes:                                          Negative Pressure Wound Therapy (V.A.C.) Other (Comment) (Active)   Black foam- # applied 3 02/28/24 1455   Cycle Continuous 03/01/24 0700   Target Pressure (mmHg) 125 02/29/24 0900   Canister Changed No 03/01/24 0700   Dressing Status Dry;Intact;Clean 03/01/24 0700   Black foam- # removed 1 03/01/24 0700   Output (mL) 10 mL 02/27/24 2001       Negative Pressure Wound Therapy (V.A.C.) Groin (Active)   Black foam- # applied 2 03/01/24 0700   Cycle Continuous 03/01/24 0700   Target Pressure (mmHg) 125 02/28/24 0900   Dressing Status Intact;Dry;Clean 03/01/24 0700   Output (mL) 35 mL 02/27/24 2001                                      Pain Score: 0                                   Pt has been [facility]-admitted [facility] S/P STSG of R ankle wound. Diabetic foot ulcer with osteomyelitis- Right posterior ankle ulcer with exposed Achilles tendon s/p wound debridement on 2/4/24 Osteomyelitis of right second toe s/p amputation on 2/4/24  S/p STSG application on 2/28/24 by Podiatry Podiatry removed RLE wound VAC 3/4/2$- now w local wound care.  Surgical site infection-  Hospitalized from 2/13/24 to 2/23/24 for right groin surgical wound infection from previous RLE bypass S/p debridement on 2/14/2024 and washout on 2/19/2024 Wound cultures - Klebsiella and Proteus S/p removal of vac by vascular surgery on 2/28- cont local wound care.    ID recommended continuation of antibiotics through 3/22/2024 due to proximity of wound infection to the bypass graft - on Cefpodoxime 400 mg twice daily Sacral wound- Noted to have unstageable wound on the sacrum on initial admission Continue wound care.   Type 2 diabetes mellitus-Continue SSI and DM diet.   Home: Takes  metformin 1000 mg twice daily/Januvia 100 mg at lunchtime PAD-Continue ASA, statin, Xarelto. COPD-Not in exacerbation Home Advair replaced by equivalent formulary Breo Continue albuterol as needed Anxiety and depression- Continue Wellbutrin  mg daily and Clonazepam 1 mg at bedtime GERD-Continue PPI. Pain control with scheduled tylenol,robaxin,lyrica and prn oxycodone.  Pt is continent of bowel and bladder.  Pt requires alarms for safety.        This week we will encourage independence with ADLs.  We will monitor labs and vital signs.  We will educate pt/family about repositioning to prevent skin breakdown.  We will assist w repositioning and perform routine skin checks.  We will monitor for adequate pain control.  We will monitor for constipation and medicate pt as ordered.  We will increase safety awareness and keep pt free from falls.                  Case Management:     Discharge Planning  Living Arrangements: Lives w/ Spouse/significant other, Lives w/ Children  Support Systems: Spouse/significant other, Daughter  Assistance Needed: TBD  Type of Current Residence: Private residence  Current Home Care Services: No  Pt readmitted to [facility] s/p skin grafting. Pt with one VAC and is expected to return home w/family at w/c level. Cm following to assist with VAC securement and arrangement of [facility] services.    Is the patient actively participating in therapies? yes  List any modifications to the treatment plan:     Barriers Interventions   Local wound care right le Wound care following   Nwb status W/c mobility, therapy exercises   stairs Therapy stair negotiation training, suggested ramp installation             Is the patient making expected progress toward goals? yes  List any update or changes to goals:     Medical Goals: Patient will be medically stable for discharge to [facility] upon completion of rehab program and Patient will be able to manage medical conditions and comorbid conditions  with medications and follow up upon completion of rehab program    Weekly Team Goals:        Discussion: pt presents with the above barriers and is now presenting for the third time in [facility]. Pt no longer has VACs and is functioning at mod a but with interventions of dme and ae can be supervision to contact guard. Pt is min a with sit pivot transfers and had poor focus and attention to task. Pt is supervision for w/c mobility. Hopping is not yet recommended at home so therapy will discuss with family access to the bathroom at home. 7-10 goals with consistent mod I with all functional mobility and w/c level adls. Recommendations are for contd c for rn pt and ot.     Anticipated Discharge Date:  3/14/24  [facility] Team Members Present:The following team members are supervising care for this patient and were present during this Weekly Team Conference.    Physician: Dr. Kiko DO  : FERNY Queen  Registered Nurse: Ira Pruitt RN  Physical Therapist: Elenita Miller DPT  Occupational Therapist: Loyda Meeks MS, OTR/L  Speech Therapist:

## 2024-03-06 NOTE — PCC OCCUPATIONAL THERAPY
OT eval to be completed 03/06/24      Pt is demonstrating fair progress with occupational therapy and is progressing toward long term goals for ADL, IADL, and functional transfers/mobility. Pts long term goals for ADLs are set up-Independent with wheelchair. Pt continues to present with impairments in activity tolerance, endurance, standing balance/tolerance, UE strength, UE ROM, memory, insight, safety, and attention. Occupational performance remains limited by fatigue, orthopedic restricitions , WBS , impulsivity, decreased caregiver support, and risk for falls. Family training/education will be required prior to D/C. Pt will continue to benefit from skilled acute rehab OT services to address above mentioned barriers and maximize functional independence in baseline areas of occupation to meet established treatment goals with overall decreased burden of care. Plan of care to continue to focus on ADL Retraining , Kitchen Mobilty, Meal preparation, Medication management, Functional Transfers, Functional Cognition, DME training/education, and Family training/education. Goals for the upcoming week are: progress pt to overall supervision/set up for ADLs/sit pivots. Completed family training with pt's daughter via phone call. Provided update on CLOF, home safety recommendations. Daughter to purchase 4x a day pill organizer.     Anticipate d/c Thursday 3/14/24 with recommendation of home OT services.

## 2024-03-06 NOTE — PCC NURSING
Pt has been re-admitted to [facility] S/P STSG of R ankle wound. Diabetic foot ulcer with osteomyelitis- Right posterior ankle ulcer with exposed Achilles tendon s/p wound debridement on 2/4/24 Osteomyelitis of right second toe s/p amputation on 2/4/24  S/p STSG application on 2/28/24 by Podiatry Podiatry removed RLE wound VAC 3/4/24- now w local wound care.  Surgical site infection-  Hospitalized from 2/13/24 to 2/23/24 for right groin surgical wound infection from previous RLE bypass S/p debridement on 2/14/2024 and washout on 2/19/2024 Wound cultures - Klebsiella and Proteus S/p removal of vac by vascular surgery on 2/28- cont local wound care.    ID recommended continuation of antibiotics through 3/22/2024 due to proximity of wound infection to the bypass graft - on Cefpodoxime 400 mg twice daily Sacral wound- Noted to have unstageable wound on the sacrum on initial admission Continue wound care.   Type 2 diabetes mellitus-Continue SSI and DM diet.   Home: Takes metformin 1000 mg twice daily/Januvia 100 mg at lunchtime PAD-Continue ASA, statin, Xarelto. COPD-Not in exacerbation Home Advair replaced by equivalent formulary Breo Continue albuterol as needed Anxiety and depression- Continue Wellbutrin  mg daily and Clonazepam 1 mg at bedtime GERD-Continue PPI. Pain control with scheduled tylenol,robaxin,lyrica and prn oxycodone.  Pt is continent of bowel and bladder.  Pt requires alarms for safety.        This week we will encourage independence with ADLs.  We will monitor labs and vital signs.  We will educate pt/family about repositioning to prevent skin breakdown.  We will assist w repositioning and perform routine skin checks.  We will monitor for adequate pain control.  We will monitor for constipation and medicate pt as ordered.  We will increase safety awareness and keep pt free from falls. We will continue education on wound care and how to monitor for s/s of infection.

## 2024-03-06 NOTE — PROGRESS NOTES
[facility]  Progress Note  Name: Lona Cedeno I  MRN: [id number]  Unit/Bed#: -01 I Date of Admission: 3/5/2024   Date of Service: 3/6/2024 I Hospital Day: 1    Assessment/Plan   Surgical site infection  Assessment & Plan  S/p right axilla/femoral bypass 1/31/2024  Postop infection s/p washout/debridement 2/14 and 2/19  VAC removed 2/28/2024  Continue oral antibiotics through 3/22/2024  Cultures positive Klebsiella/Proteus  Continue local dressing changes  Will consult vascular surgery if needed for local care  Refer to media    Diabetic foot ulcer with osteomyelitis (HCC)  Assessment & Plan  Lab Results   Component Value Date    HGBA1C 6.0 (H) 02/28/2024       Recent Labs     03/05/24  1159 03/05/24  1631 03/05/24  2120 03/06/24  0702   POCGLU 107 114 199* 154*         Blood Sugar Average: Last 72 hrs:  (P) 155.4956468013330712  S/p medial ulcer debridement/partial second toe amputation 2/4/2024  Recent STSG on 2/28/2024 with VAC removal 3/4/2024  Continue local care  NWB  podiatry following for management  PMR managing therapy/pain      PAD (peripheral artery disease) ([facility])  Assessment & Plan  S/p right axillo-femoral bypass 1/31/2024  Continue aspirin/statin/low-dose Xarelto    Essential hypertension  Assessment & Plan  Continue current medications  stable    Type 2 diabetes mellitus with diabetic polyneuropathy ([facility])  Assessment & Plan  Lab Results   Component Value Date    HGBA1C 6.0 (H) 02/28/2024       Recent Labs     03/05/24  1159 03/05/24  1631 03/05/24  2120 03/06/24  0702   POCGLU 107 114 199* 154*         Blood Sugar Average: Last 72 hrs:  (P) 155.5316596824173240  Home: Takes metformin 1000 mg twice daily/Januvia 100 mg at lunchtime  Here: Sliding scale/DM diet/metformin 500mg bid  Start metformin as above and move towards getting back to oral meds    Sacral wound  Assessment & Plan  Consult wound nurse for management  Continue offloading    Tobacco use  disorder  Assessment & Plan  Continue nicotine patch  Recommend cessation                 The above assessment and plan was reviewed and updated as determined by my evaluation of the patient on 3/6/2024.    Labs:   Results from last 7 days   Lab Units 03/04/24  0540 03/03/24  0631   WBC Thousand/uL 9.33 8.83   HEMOGLOBIN g/dL 11.6 11.1*   HEMATOCRIT % 37.9 35.4   PLATELETS Thousands/uL 280 277     Results from last 7 days   Lab Units 03/04/24  0540 03/03/24  0631   SODIUM mmol/L 137 140   POTASSIUM mmol/L 4.1 4.2   CHLORIDE mmol/L 101 103   CO2 mmol/L 30 31   BUN mg/dL 21 17   CREATININE mg/dL 0.47* 0.48*   CALCIUM mg/dL 9.3 9.5             Results from last 7 days   Lab Units 03/06/24  0702 03/05/24  2120 03/05/24  1631   POC GLUCOSE mg/dl 154* 199* 114       Imaging  No orders to display       Review of Scheduled Meds:  Current Facility-Administered Medications   Medication Dose Route Frequency Provider Last Rate    acetaminophen  975 mg Oral Q8H JILLIAN Vanesa Sheehan, CRNP      albuterol  2 puff Inhalation Q6H PRN Vanesa Sheehan, CRNP      aspirin  81 mg Oral Daily Vanesa Sheehan, CRNP      buPROPion  300 mg Oral Daily Vanesa Sheehan, CRNP      cefpodoxime  400 mg Oral BID With Meals Vanesa Sheehan, CRNP      clonazePAM  1 mg Oral QPM Vanesa Sheehan, CRNP      cyanocobalamin  1,000 mcg Oral Daily Vanesa Sheehan, CRNP      fish oil  1,000 mg Oral Daily Vanesa Sheehan, CRNP      fluticasone-vilanterol  1 puff Inhalation Daily Vanesa Sheehan, CRNP      hydrALAZINE  25 mg Oral Q8H PRN Vanesa Sheehan, CRNP      insulin lispro  1-5 Units Subcutaneous TID AC Vanesa Sheehan, CRNP      insulin lispro  1-5 Units Subcutaneous HS Vanesa Sheehan, CRNP      methocarbamol  250 mg Oral Q8H JILLIAN Vanesa Sheehan, CRNP      multivitamin-minerals  1 tablet Oral Daily Vanesa Sheehan, CRNP      nicotine  1 patch Transdermal Daily Vanesa Sheehan, CRNP      oxyCODONE  2.5 mg Oral Q4H PRN Vaneas Sheehan, CRNP      Or    oxyCODONE  5 mg Oral Q4H PRN Vanesa Sheehan, CRNP       pantoprazole  40 mg Oral Early Morning Vanesa Sheehan, CRNP      polyethylene glycol  17 g Oral Daily Vanesa Sheehan, CRNP      pregabalin  200 mg Oral TID Vanesa Sheehan, CRNP      rivaroxaban  2.5 mg Oral BID Vanesa Sheehan, CRNP      senna-docusate sodium  1 tablet Oral BID Vanesa Sheehan, CRNP         Subjective/ HPI: Patient seen and examined. Patients overnight issues or events were reviewed with nursing staff. New or overnight issues include the following:     Pt seen and examined. No overnight issues noted, participated in therapy this am    ROS:   A 10 point ROS was performed; negative except as noted above.        *Labs /Radiology studies Reviewed  *Medications  reviewed and reconciled as needed  *Please refer to order section for additional ordered labs studies      Physical Examination:  Vitals:   Vitals:    03/05/24 1330 03/05/24 2107 03/06/24 0543   BP: 137/54 127/58 136/62   BP Location: Left arm Right arm Left arm   Pulse: 77 76 75   Resp: 16 16 18   Temp: 97.5 °F (36.4 °C) 98.6 °F (37 °C) 97.6 °F (36.4 °C)   TempSrc: Oral Oral Oral   SpO2: 92% 91% 93%   Weight:   69.5 kg (153 lb 3.5 oz)       General Appearance: NAD; pleasant  HEENT: PERRLA, conjuctiva normal; mucous membranes moist; face symmetrical  Neck:  Supple  Lungs: clear bilaterally, normal respiratory effort, no retractions, expiratory effort normal, on room air  CV: regular rate and rhythm, no murmurs rubs or gallops noted   ABD: soft non tender, +BS x4, obese  EXT: DP pulses intact, no lymphadenopathy, no edema; except NWB RLE with ACE wrap and boot in place  Skin: normal turgor, normal texture, no rash  Psych: affect normal, mood normal  Neuro: AAOx3       The above physical exam was reviewed and updated as determined by my evaluation of the patient on 3/6/2024.    Invasive Devices       Peripheral Intravenous Line  Duration             Peripheral IV 03/05/24 Dorsal (posterior);Left Wrist <1 day              Drain  Duration             Ureteral  Internal Stent Left ureter 6 Fr. 107 days                       VTE Pharmacologic Prophylaxis: Enoxaparin  Code Status: Level 3 - DNAR and DNI  Current Length of Stay: 1 day(s)    Total floor / unit time spent today 30 minutes  Coordination of patient's care was performed in conjunction with primary service. Time invested included review of patient's labs, vitals, and management of their comorbidities with continued monitoring, examination of patient as well as answering patient questions, documenting her findings and creating progress note in electronic medical record,  ordering appropriate diagnostic testing.       ** Please Note:  voice to text software may have been used in the creation of this document. Although proof errors in transcription or interpretation are a potential of such software**

## 2024-03-06 NOTE — PROGRESS NOTES
OT Initial Evaluation       03/06/24 0700   Patient Data   Rehab Impairment Impairment of mobility, safety and Activities of Daily Living (ADLs) due to Other Disabling Impairments:  13  Other Disabling Impairments   Etiologic Diagnosis Diabetic foot ulcer with osteomyelitis s/p fem bypass, right foot wound and achilles debridement with 2nd toe partial amputation and now s/p STSG   Date of Onset 02/26/24   Support System   Name Carmelo   Relationship spouse   Able to provide 24 hour supervision Yes   Able to provide physical help? No  (parkinsons)   Multiple Support Systems Yes   Support System 2   Name 2 Ariela   Relationship 2 daughter   Able to provide 24 hour supervision 2 No  (works FT)   Able to provide physical help? (2) Yes   Support System 3   Name 3 Balbina   Relationship 3 daughter   Able to provide 24 hour supervision 3 Yes   Able to provide physical help? (3) No  (intellectual disability; assists with some IADLs PTA)   Home Setup   Type of Home Single Level   Method of Entry Stairs   Number of Stairs   (5+5 in front; 3 in the back where pt reports that is where they intend on installing a ramp)   Number of Stairs in Home 0   First Floor Bathroom Full;Shower;Grab Bars;Built-in shower seat  (primary shower)   First Floor Bathroom Accessibility Grab bars by toilet;Grab bars in tub/shower   Second Floor Bathroom None  (has tub/shower as 2nd bathroom in home but she does not use)   First Floor Setup Available Yes   Home Modification Comment anticipate ramp installation   Available Equipment Roller Walker;Rollator;Single Point Cane;Bedside Commode   Baseline Information   Transportation    Prior Device(s) Used Roller Walker;Single Point Cane  (rollator in community; reports using no AD in home)   Prior IADL Participation   Money Management Identify Money;Estimate Change;Estimate Costs;Combine Bills;Manage Checkbook  (daughter Ariela has been assisting since pt has been in hospital)   Meal  Preparation Full Participation  (daughter darrin assisted PRN)   Laundry   (daughter completes)   Home Cleaning   (daughter completes)   Prior Level of Function   Self-Care 3. Independent - Patient completed the activities by him/herself, with or without an assistive device, with no assistance from a helper.   Indoor-Mobility (Ambulation) 3. Independent - Patient completed the activities by him/herself, with or without an assistive device, with no assistance from a helper.   Stairs 3. Independent - Patient completed the activities by him/herself, with or without an assistive device, with no assistance from a helper.   Functional Cognition 3. Independent - Patient completed the activities by him/herself, with or without an assistive device, with no assistance from a helper.   Prior Assistance Needed for Household Chores/Cleaning   Prior Device Used Z. None of the above   Falls in the Last Year   Number of falls in the past 12 months 1   Type of Injury Associated with Fall Injury   Patient Preference   Nickname (Patient Preference) Lona Hastings   Psychosocial   Psychosocial (WDL) WDL   Patient Behaviors/Mood Calm;Cooperative   Restrictions/Precautions   Precautions Bed/chair alarms;Cognitive;Fall Risk;Hard of hearing;Impulsive;Contact/isolation;Supervision on toilet/commode   RLE Weight Bearing Per Order (S)  NWB   Braces or Orthoses   (prevalon boot RLE)   Pain Assessment   Pain Assessment Tool 0-10   Pain Score No Pain   Eating Assessment   Type of Assistance Needed Set-up / clean-up   Physical Assistance Level No physical assistance   Eating CARE Score 5   Oral Hygiene   Type of Assistance Needed Physical assistance   Physical Assistance Level 51%-75%   Comment mod A to steady in stance; s/u for when seated EOB   Oral Hygiene CARE Score 2   Tub/Shower Transfer   Reason Not Assessed Sponge Bath;Medical  (no active shower orders)   Shower/Bathe Self   Type of Assistance Needed Physical assistance   Physical Assistance  Level 51%-75%   Comment would req mod A compared to baseline; pt completed SB seated EOB/supine for az bathing requiring setup/supervision and mod vc for maintaining NWB   Shower/Bathe Self CARE Score 2   Bathing   Assessed Bath Style Sponge Bath   Anticipated D/C Bath Style Sponge Bath   Dressing/Undressing Clothing   Type of Assistance Needed Supervision   Physical Assistance Level No physical assistance   Comment seated EOB   Upper Body Dressing CARE Score 4   Type of Assistance Needed Physical assistance   Physical Assistance Level 51%-75%   Comment able to thread over BLE seated EOB but would req A in stance to don over hips when comparing to baseline. pt donned over hips via rolling in supine making her overall LB dressing supervision with mod vc for maintaining NWB   Lower Body Dressing CARE Score 2   Putting On/Taking Off Footwear   Type of Assistance Needed Physical assistance   Physical Assistance Level 51%-75%   Comment mod I with L foot, A for R   Putting On/Taking Off Footwear CARE Score 2   Toileting Hygiene   Type of Assistance Needed Physical assistance   Physical Assistance Level 76% or more   Comment A for CM and maintaining NWB; supv with hygiene   Toileting Hygiene CARE Score 2   Toilet Transfer   Type of Assistance Needed Physical assistance   Physical Assistance Level 51%-75%   Comment mod A compared to baseline; able to complete sit pivot from WC > DA BSC with CG/CS   Toilet Transfer CARE Score 2   Transfer Bed/Chair/Wheelchair   Type of Assistance Needed Physical assistance   Physical Assistance Level 26%-50%   Comment sit pivot both L and R with cues to maintain RLE NWB   Chair/Bed-to-Chair Transfer CARE Score 3   Roll Left and Right   Type of Assistance Needed Supervision   Physical Assistance Level No physical assistance   Roll Left and Right CARE Score 4   Sit to Lying   Type of Assistance Needed Supervision   Physical Assistance Level No physical assistance   Comment HOB flat, no  "bedrails   Sit to Lying CARE Score 4   Lying to Sitting on Side of Bed   Type of Assistance Needed Supervision   Physical Assistance Level No physical assistance   Comment HOB flat, no bedrails   Lying to Sitting on Side of Bed CARE Score 4   Sit to Stand   Type of Assistance Needed Physical assistance   Physical Assistance Level 51%-75%   Comment no AD; min A/CG with RW. cues to maintain NWB   Sit to Stand CARE Score 2   Comprehension   QI: Comprehension 4. Undestands: Clear comprehension without cues or repetitions   Expression   QI: Expression 4. Express complex messages without difficulty and with speech that is clear and easy to understan   Problem Solving   Complex Manages finances;Manages discharge planning;Manages medications   Routine Manages call bell;Manages ADL   RUE Assessment   RUE Assessment WFL   LUE Assessment   LUE Assessment WFL   Cognition   Overall Cognitive Status Impaired   Arousal/Participation Alert;Responsive;Cooperative   Attention Attends with cues to redirect   Orientation Level Oriented X4   Memory Decreased recall of precautions   Following Commands Follows one step commands without difficulty   Comments verbalizes understanding on NWB but during functional activities, is observed WBing and when cued, pt reports that she is just \"resting her foot\". Will need cont edu on WBing restrictions   Vision   Vision Comments wears glasses   Discharge Information   Vocational Plan Retired/not working   Patient's Discharge Plan DC home mod I   Patient's Rehab Expectations \"This better be the time where I get to go home\"   Barriers to Discharge Home Limited Family Support;Unsafe Home Setup;Decreased Cognitive Function;Decreased Strength;Decreased Endurance;Safety Considerations   Impressions Pt is a 78 y.o. female who was initially admitted on 1/23/24 due to diabetic ulcer of the right toe and right ankle wound with achilles tendon exposed. Pt was dx with Diabetic foot ulcer with osteomyelitis s/p " fem bypass, right foot wound and achilles debridement with 2nd toe partial amputation and now s/p STSG and transferred to [facility] to receive skilled therapy services. Pt  has a past medical history of Anxiety, Closed fracture of coccyx (HCC), Diabetes mellitus (HCC), GERD (gastroesophageal reflux disease), Hyperlipidemia, Hypertension, IBS (irritable bowel syndrome), and Shoulder fracture. Current precautions include NWB RLE with prevalon boot, bed/chair alarms, fall risk, cognitive impairments, [place] and supervision on toilet/commode. See flowsheet for details of pts home setup, PLOF and current functional status. Pts impairments include pain, endurance, activity tolerance, functional standing tolerance, unsupportive home environment, decreased I w/ ADLS/IADLS, cognitive impairments, decreased safety awareness, and decreased insight into deficits. These impairments along with  limited caregiver support, steps to enter, non compliance, difficulty performing ADLs, and difficulty performing IADLs causes the inability to safely complete A/IADLs including Grooming, Bathing, LB dressing, Toileting, Toilet transfer, Tub/shower Transfer, and IADL Management. Pt presents with good rehab potential with motivation to participate and achieve goal of DC home. Pt is unsafe to DC home at this time, recommending 7-10 days to achieve independent with assistive device level goals with wheelchair  and RW. Plan to achieve stated goals with interventions focused on ADL Retraining , LB Dressing, IADL training , Kitchen Mobilty, Meal preparation, Medication management , Functional Transfers, Functional Cognition, Standing tolerance, Standing balance , DME training/education, Family training/education, Energy conservation training/education, healthy coping education, Leisure and social pursuits, and community re-integration. Please put heavy focus on IADL retraining at  level as pt is primary cg for both her spouse and daughter and will need  to continue as such upon DC home.   OT Therapy Minutes   OT Time In 0700   OT Time Out 0830   OT Total Time (minutes) 90   OT Mode of treatment - Individual (minutes) 90   OT Mode of treatment - Concurrent (minutes) 0   OT Mode of treatment - Group (minutes) 0   OT Mode of treatment - Co-treat (minutes) 0   OT Mode of Treatment - Total time(minutes) 90 minutes   OT Cumulative Minutes 90   Cumulative Minutes   Cumulative therapy minutes 90

## 2024-03-06 NOTE — UTILIZATION REVIEW
NOTIFICATION OF ADMISSION DISCHARGE   This is a Notification of Discharge from [facility]. Please be advised that this patient has been discharge from our facility. Below you will find the admission and discharge date and time including the patient’s disposition.   UTILIZATION REVIEW CONTACT:  Mehdi Morrison  Utilization   Network Utilization Review Department  Phone: [phone] x carefully listen to the prompts. All voicemails are confidential.  Email: [email or web address]     ADMISSION INFORMATION  PRESENTATION DATE: 2/27/2024  4:15 PM  OBERVATION ADMISSION DATE:   INPATIENT ADMISSION DATE: 2/27/24  4:15 PM   DISCHARGE DATE: 3/5/2024  2:00 PM   DISPOSITION:[facility]    Network Utilization Review Department  ATTENTION: Please call with any questions or concerns to [phone] and carefully listen to the prompts so that you are directed to the right person. All voicemails are confidential.   For Discharge needs, contact Care Management DC Support Team at [phone] opt. 2  Send all requests for admission clinical reviews, approved or denied determinations and any other requests to dedicated fax number below belonging to the campus where the patient is receiving treatment. List of dedicated fax numbers for the Facilities:  FACILITY NAME UR FAX NUMBER   ADMISSION DENIALS (Administrative/Medical Necessity) [phone]   DISCHARGE SUPPORT TEAM ([facility]) [phone]   PARENT CHILD HEALTH (Maternity/NICU/Pediatrics) [phone]   [facility] [phone]   General acute hospital [phone]   [facility] [phone]   General acute hospital [phone]   [facility] [phone]   [facility] [phone]   [facility] [phone]   [facility] [phone]   [facility] [phone]   [facility] [phone]   [facility] [phone]   [facility] [phone]

## 2024-03-06 NOTE — PROGRESS NOTES
PT ARC GOALS        03/06/24 0930   Rehab Team Goals   Transfer Team Goal Patient will be independent with transfers with least restrictive device upon completion of rehab program   Locomotion Team Goal Patient will be independent with locomotion with least restrictive device upon completion of rehab program   Rehab Team Interventions   PT Interventions Gait Training;Therapeutic Exercise;Neuromuscualr Reeducation;Transfer Training;Bed Mobility;Wheelchair Mobility;Modalities;Patient/Family Education   PT Transfer Goal   Roll left and right Goal 06. Independent - Patient completes the activity by him/herself with no assistance from a helper.   Sit to lying Goal 06. Independent - Patient completes the activity by him/herself with no assistance from a helper.   Lying to sitting on side of bed Goal 06. Independent - Patient completes the activity by him/herself with no assistance from a helper.   Sit to stand Goal 06. Independent - Patient completes the activity by him/herself with no assistance from a helper.   Chair/bed-to-chair transfer Goal 06. Independent - Patient completes the activity by him/herself with no assistance from a helper.   Car Transfer Goal 04. Supervision or touching assistance- [place] provides VERBAL CUES or supervision throughout activity.   Assistive Device   (LRAD)   Status Target goal - one week;Target goal - two weeks  (7- 10 days)   Locomotion Goal   Primary discharge mode of locomotion Both   Target Walk Distance 10 ft   Assist Device Roller Walker   Walk 10 feet Goal 04. TOUCHING/ STEADYING assistance as patient completes activity.   Walk 50 feet with 2 turns Goal 88. Not attempted due to medical condition or safety concerns   Walk 150 feet Goal 88. Not attempted due to medical condition or safety concerns   Walking 10 feet on uneven surface 88. Not attempted due to medical condition or safety concerns   Walking Goal Status Target goal - one week;Target goal - two weeks  (7- 10 days)   Type  of Wheelchair Used 1. Manual   Wheel 50 feet with 2 turns Goal 06. Independent - Patient completes the activity by him/herself with no assistance from a helper.   Wheel 150 feet Goal 06. Independent - Patient completes the activity by him/herself with no assistance from a helper.   Decrease Assist With Remove Leg Rest;Replace Leg Rest;Locking Brakes   Environment Level Surface;Well Lit;Uneven Surface   Wheelchair Goal Status Target goal - one week;Target goal - two weeks  (7- 10 days)   Stairs Goal   1 step or curb goal 03. Partial/moderate assistance - [place] does less than half the effort. [place] lifts or holds trunk or limbs and provides more than half the effort.  (alternate method may have to be identified if ramp is not installed in timely manner)   4 steps Goal 03. Partial/moderate assistance - [place] does less than half the effort. [place] lifts or holds trunk or limbs and provides more than half the effort.  (alternate method may have to be identified if ramp is not installed in timely manner)   12 steps Goal 09. Not applicable   Number of Stairs 5  (+5)   Object Retrieval Goal   Picking up object Goal 88. Not attempted due to medical condition or safety concerns  (not recommending patient peform from standing position due to high fall risk.)

## 2024-03-06 NOTE — CASE MANAGEMENT
Met w/pt and reviewed rehab routine and cm role. This is pt's third admission to [facility]. Pt resides with spouse and daughter Arlene but neither of them drive. They have another daughter who lives nearby and can assist. Pt has a roller walker and a commode. Pt reports the roller walker is in the attic and no one can retrieve it but her  got the commode out today and was cleaning it up in preparation for her to return home. Pt believes the ramp is in place for entry to the home. [facility] [place]. Although pt no longer has any VAC's, she will still require [facility] serivces on dc. Pt agreeable to [facility] for rn pt and ot services. Cm reviewed team mtg process and update with dc scheduled for 3/14. Cm will follow up with referral to [facility].     Cm emailed klaudia chávez at [facility] who confirmed auth can be adjusted with admit date 3/5 with update due 3/7.

## 2024-03-06 NOTE — PROGRESS NOTES
PM&R PROGRESS NOTE:  Lona Cedeno 78 y.o. female MRN: [id number]  Unit/Bed#: -01 Encounter: [id number]    Rehab Diagnosis: Impairment of mobility, safety and Activities of Daily Living (ADLs) due to Other Disabling Impairments:  13  Other Disabling Impairments     Etiologic: Diabetic foot ulcer with osteomyelitis s/p fem bypass, right foot wound and achilles debridement with 2nd toe partial amputation and now s/p STSG   Date of Onset: 2/26/24     Date of surgery: 1/31, 2/4, 2/14, 2/28     History of Present Illness:   Lona Cedeno is a 78 y.o. female with history of anxiety, closed fracture of the coccyx in May 2023, diabetes, GERD, hyperlipidemia, hypertension, IBS who presented to the [facility] on 1/23 from the podiatry office with a diabetic ulcer of the right toe and right ankle with exposed Achilles tendon.  She was stabbed have osteomyelitis of the right foot transferred to [facility] to reconsider vascularization and surgical options.  She is recommended to undergo an extra-anatomic bypass.  There was discussion about right transtibial amputation however decision was made for attempted limb salvage.  She underwent the above bypass on 1/31/2024 as well as on 2/4/2024 and underwent right toe and heel wound debridement with cycle and partial amputation of the toe and VAC placement.  Her course was complicated by arrest chest wall hematoma s/p evacuation on 2/4 with vascular.  On 2/13 the patient needed discharge back to acute care in the setting of fevers and leukocytosis from a potential infectious standpoint.  She was also given a discharge from the [facility] to acute care setting after podiatry wanted to take the patient back for split thickness skin grafting which occurred on 2/28 with Dr. Yeboah.  Patient was transition to oral antibiotics and wound vacs have been removed.  She remains nonweightbearing to the right lower extremity. The patient was evaluated by  the Rehabilitation team and deemed an appropriate candidate for comprehensive inpatient rehabilitation and admitted to the [facility] on 3/5/2024  2:03 PM    SUBJECTIVE: Patient seen face to face.  No acute issues.  For therapy evaluations today but overall has been doing better than she did on prior admissions mainly due to increased endurance and no wound vacs in place.  We anticipated discharge after team conference likely for 3/14.  Continue to work with nursing and therapy team for overall compliance with weightbearing for the right lower extremity.  She denies any fever, chills, nausea vomiting, cough, shortness of breath, diarrhea or constipation.  Pain is under control on the current regimen and mood is good.    ASSESSMENT: Stable, progressing, therapy evaluations      PLAN:    Rehabilitation  Functional deficits: Self-care and mobility  Continue current rehabilitation plan of care to maximize function.    Functional update:   PT: Karon  OT: Won    Estimated Discharge: Estimated 3/14    DVT prophylaxis  On Xarelto     Pain  Acetaminophen 975 mg every 8 hours  Lyrica 200 mg 3 times daily  Robaxin 250 mg every 8 hours  Oxycodone 2.5-5 mg every 4 hours as needed     Bladder plan  Continent     Bowel plan  Continent  Last bowel movement on 3/5     Code Status  Level 3 DNAR/DNI      * Surgical site infection  Assessment & Plan  Needed treatment with cefepime for surgical site infection however also on vancomycin which was discontinued on 2/16  Right groin breakdown status post wound washout with VAC placement on 2/14 as well as 2/19  Was followed by infectious disease and continued on cefepime with plan to transition to cefpodoxime 400 mg twice daily for 4 weeks.  Cultures were positive for Klebsiella and Proteus  VAC to the right groin as well as the foot have since been discontinued and continues for cefpodoxime for antibiotic treatment  Maintain nonweightbearing to the right lower extremity  Underwent split  thickness skin grafting with podiatry on 2/28 for the right foot  Due to the surgical site infection, sepsis and requirement for multiple trips to the OR for wound debridement, VAC placement, split-thickness grafting patient is not at her functional baseline and would benefit from daily physician oversight and rehabilitation nursing as well as aggressive physical and occupational therapy under guidance of physical medicine and rehabilitation specialist.  Patient will tolerate this 3 hours/day 5 to 7 days/week with ultimate discharge goal for the community    Impulsive  Assessment & Plan  Had impulsive behaviors during prior admissions and discussed overall behavioral plan.  Patient on last admission was impulsive and not using call bell appropriately as well as not following weightbearing restrictions  Discussed with patient again however continued vigilance with nursing and therapy checks, alarms and relatively sensitive settings and would benefit from Q 2-hour bowel bladder checks    Sacral wound  Assessment & Plan  Unstageable wound on the sacrum on initial evaluations, POA  Consult wound care for management  Vigilant turns in bed and weight shifts, daily sacral checks by nursing    Acute pain due to injury  Assessment & Plan  Acetaminophen 975 mg every 8 hours  Lyrica 200 mg 3 times daily  Robaxin 250 mg every 8 hours  Oxycodone 2.5-5 mg every 4 hours as needed    Impaired mobility and activities of daily living  Assessment & Plan  - Rehabilitation medicine physician for daily monitoring of care, 24 hour availability for acute  medical issues, medication management, and therapeutic and diagnostic assessments.  - 24 hour rehabilitation nursing 7 days per week for: management/teaching of medications,  bowel/bladder routine, skin care.  - PT, OT for 2-3 hours per day, 5 to 6 days per week; 15 hours per week  - Rehabilitation Psychology for adjustment and coping  - MSW for barriers to discharge, community resources,  and family support  - Discharge planning following to help ensure a safe and efficient discharge    Anemia  Assessment & Plan  Most recent hemoglobin 11.6 which is just within the normal range but has been overall improving  Continue to follow biweekly labs or sooner if clinically indicated as patient is overall at risk for drop in hemoglobin  On review of the last 2 weeks of labs has improved slightly from the mid to upper 10s  Continue B12 supplementation    Diabetic foot ulcer with osteomyelitis ([facility])  Assessment & Plan  Patient presented with right posterior ankle ulceration with exposed Achilles tendon and required foot debridement on 2/4/2024 with VAC placement with right medial ankle ulcer, right second toe osteomyelitis status post right second toe amputation  Wound vacs have since been discontinued and continues to be followed by podiatry  Require going back to the OR on 2/28 for split-thickness grafting  Remains nonweightbearing to the right lower extremity      Tobacco use disorder  Assessment & Plan  Smoking cessation counseling  Had been on nicotine patch 21 mg / 24-hour, however decreased to 14 mg    COPD, severity to be determined ([facility])  Assessment & Plan  Continue inhalers per internal medicine including albuterol as needed as well as fluticasone-vilanterol 1 puff daily  Monitor oxygen saturations and therapy    PAD (peripheral artery disease) ([facility])  Assessment & Plan  Patient was on aspirin, statin and low-dose Xarelto 2.5 mg twice daily  Status post 1/31 bypass axillary-femoral, right with 8 mm ringed PTFE graft    GERD without esophagitis  Assessment & Plan  Continue Protonix    Anxiety and depression  Assessment & Plan  Continue Wellbutrin  mg daily as well as Klonopin  Supportive counseling and consider neuropsychology if indicated and patient agreeable    Essential hypertension  Assessment & Plan  Monitor pressures in therapy however has been off of regular scheduled  antihypertensives  Ordered hydralazine as needed for systolic blood pressure greater than 160    Type 2 diabetes mellitus with diabetic polyneuropathy (HCC)  Assessment & Plan  Lab Results   Component Value Date    HGBA1C 6.0 (H) 02/28/2024       Recent Labs     03/05/24  1159 03/05/24  1631 03/05/24  2120 03/06/24  0702   POCGLU 107 114 199* 154*     Has been on metformin as an outpatient but held while inpatient and currently on a sliding scale regimen.  Monitor per internal medicine and make adjustments as needed  Glycemic control has been good we will need to continue to for appropriate wound healing potential          Appreciate IM consultants medical co-management.  Labs, medications, and imaging personally reviewed.      ROS:  A ten point review of systems was completed on 03/06/24 and pertinent positives are listed in subjective section. All other systems reviewed were negative.       OBJECTIVE:   /62 (BP Location: Left arm)   Pulse 75   Temp 97.6 °F (36.4 °C) (Oral)   Resp 18   Wt 69.5 kg (153 lb 3.5 oz)   SpO2 93%   BMI 28.95 kg/m²     Physical Exam  Vitals reviewed.   Constitutional:       General: She is not in acute distress.  HENT:      Head: Normocephalic and atraumatic.      Right Ear: External ear normal.      Left Ear: External ear normal.      Nose: Nose normal. No rhinorrhea.      Mouth/Throat:      Mouth: Mucous membranes are moist.      Pharynx: Oropharynx is clear.   Eyes:      General: No scleral icterus.  Cardiovascular:      Rate and Rhythm: Normal rate.   Pulmonary:      Effort: Pulmonary effort is normal. No respiratory distress.   Abdominal:      General: There is no distension.      Palpations: Abdomen is soft.   Musculoskeletal:      Cervical back: Normal range of motion.      Right lower leg: Edema present.      Left lower leg: No edema.      Comments: Ace wrap to right lower extremity with Prevalon boot in place   Skin:     General: Skin is warm and dry.   Neurological:       Mental Status: She is alert and oriented to person, place, and time.      Motor: No weakness.   Psychiatric:         Mood and Affect: Mood normal.         Behavior: Behavior normal.          Lab Results   Component Value Date    WBC 9.33 03/04/2024    HGB 11.6 03/04/2024    HCT 37.9 03/04/2024     (H) 03/04/2024     03/04/2024     Lab Results   Component Value Date    SODIUM 137 03/04/2024    K 4.1 03/04/2024     03/04/2024    CO2 30 03/04/2024    BUN 21 03/04/2024    CREATININE 0.47 (L) 03/04/2024    GLUC 161 (H) 03/04/2024    CALCIUM 9.3 03/04/2024     Lab Results   Component Value Date    INR 1.16 02/14/2024    INR 1.03 02/04/2024    INR 1.11 02/03/2024    PROTIME 14.7 (H) 02/14/2024    PROTIME 13.4 02/04/2024    PROTIME 14.2 02/03/2024           Current Facility-Administered Medications:     acetaminophen (TYLENOL) tablet 975 mg, 975 mg, Oral, Q8H JILLIAN, Vanesa Sheehan, CRNP, 975 mg at 03/06/24 0525    albuterol (PROVENTIL HFA,VENTOLIN HFA) inhaler 2 puff, 2 puff, Inhalation, Q6H PRN, Vanesa Sheehan, CRNP    aspirin chewable tablet 81 mg, 81 mg, Oral, Daily, Vanesa Sheehan, CRNP, 81 mg at 03/06/24 0803    buPROPion (WELLBUTRIN XL) 24 hr tablet 300 mg, 300 mg, Oral, Daily, Vanesa Sheehan, CRNP, 300 mg at 03/06/24 0807    cefpodoxime (VANTIN) tablet 400 mg, 400 mg, Oral, BID With Meals, Vanesa Sheehan, CRNP, 400 mg at 03/06/24 0801    clonazePAM (KlonoPIN) tablet 1 mg, 1 mg, Oral, QPM, Vanesa Sheehan, CRNP, 1 mg at 03/05/24 1718    cyanocobalamin (VITAMIN B-12) tablet 1,000 mcg, 1,000 mcg, Oral, Daily, Vanesa Sheehan, CRNP, 1,000 mcg at 03/06/24 0803    fish oil capsule 1,000 mg, 1,000 mg, Oral, Daily, Vanesa FAUSTO SheehanNP, 1,000 mg at 03/06/24 0807    fluticasone-vilanterol 200-25 mcg/actuation 1 puff, 1 puff, Inhalation, Daily, ROXANE Bear, 1 puff at 03/06/24 0810    hydrALAZINE (APRESOLINE) tablet 25 mg, 25 mg, Oral, Q8H PRN, ROXANE Bear    insulin lispro (HumALOG/ADMELOG) 100 units/mL  subcutaneous injection 1-5 Units, 1-5 Units, Subcutaneous, TID AC, 1 Units at 03/06/24 0811 **AND** Fingerstick Glucose (POCT), , , TID AC, Vanesa Sheehan, CRNP    insulin lispro (HumALOG/ADMELOG) 100 units/mL subcutaneous injection 1-5 Units, 1-5 Units, Subcutaneous, HS, Vanesa Sheehan, CRNP, 1 Units at 03/05/24 2122    metFORMIN (GLUCOPHAGE) tablet 500 mg, 500 mg, Oral, BID With Meals, Vanesa Sheehan, CRNP    methocarbamol (ROBAXIN) tablet 250 mg, 250 mg, Oral, Q8H JILLIAN, Vanesa Sheehan, CRNP, 250 mg at 03/06/24 0526    multivitamin-minerals (CENTRUM) tablet 1 tablet, 1 tablet, Oral, Daily, Vanesa Sheehna, CRNP, 1 tablet at 03/06/24 0801    nicotine (NICODERM CQ) 14 mg/24hr TD 24 hr patch 1 patch, 1 patch, Transdermal, Daily, Vanesa Sheehan, CRNP, 1 patch at 03/06/24 0805    oxyCODONE (ROXICODONE) split tablet 2.5 mg, 2.5 mg, Oral, Q4H PRN **OR** oxyCODONE (ROXICODONE) IR tablet 5 mg, 5 mg, Oral, Q4H PRN, Vanesa Sheehan, CRNP    pantoprazole (PROTONIX) EC tablet 40 mg, 40 mg, Oral, Early Morning, Vanesa Sheehan, CRNP, 40 mg at 03/06/24 0524    polyethylene glycol (MIRALAX) packet 17 g, 17 g, Oral, Daily, Vanesa Sheehan, CRNP, 17 g at 03/06/24 0805    pregabalin (LYRICA) capsule 200 mg, 200 mg, Oral, TID, Vanesa Sheehan, CRNP, 200 mg at 03/06/24 0802    rivaroxaban (XARELTO) tablet 2.5 mg, 2.5 mg, Oral, BID, Vanesa Sheehan, CRNP, 2.5 mg at 03/06/24 0806    senna-docusate sodium (SENOKOT S) 8.6-50 mg per tablet 1 tablet, 1 tablet, Oral, BID, Vanesa Sheehan, CRNP, 1 tablet at 03/06/24 0802    Past Medical History:   Diagnosis Date    Anxiety     Closed fracture of coccyx (HCC) 05/24/2023    Diabetes mellitus (HCC)     GERD (gastroesophageal reflux disease)     Hyperlipidemia     Hypertension     IBS (irritable bowel syndrome)     Shoulder fracture        Patient Active Problem List    Diagnosis Date Noted    Surgical site infection 02/13/2024    Sacral wound 02/23/2024    Impulsive 02/23/2024    Other dietary vitamin B12 deficiency anemia  02/16/2024    Leukocytosis 02/13/2024    Sepsis without acute organ dysfunction (HCC) 02/13/2024    At risk for venous thromboembolism (VTE) 02/09/2024    At high risk for skin breakdown 02/09/2024    Wound of skin 02/09/2024    Impaired mobility and activities of daily living 02/09/2024    Acute pain due to injury 02/09/2024    Hematoma 02/06/2024    Anemia 02/06/2024    Constipation 01/26/2024    Preoperative cardiovascular examination 01/25/2024    Diabetic foot ulcer with osteomyelitis (HCC) 01/23/2024    Ankle ulcer, right, with fat layer exposed (HCC) 12/14/2023    Age-related osteoporosis without current pathological fracture 11/28/2023    Abnormal CT of the chest 10/17/2023    COPD, severity to be determined (HCC) 10/17/2023    Tobacco use disorder 10/17/2023    PAD (peripheral artery disease) (HCC) 10/10/2023    Bladder neoplasm 09/11/2023    Left renal mass 09/11/2023    Lactic acidosis 08/11/2023    Hypomagnesemia 08/11/2023    Degenerative lumbar disc 05/24/2023    Urinary incontinence 04/13/2023    GERD without esophagitis 10/16/2019    Essential hypertension 02/15/2019    Anxiety and depression 02/15/2019    Type 2 diabetes mellitus with diabetic polyneuropathy (HCC) 02/12/2019          Rigo Hoover,   Physical Medicine and Rehabilitation  [facility]    I have spent a total time of 35 minutes on 03/06/24 in caring for this patient including Counseling / Coordination of care, Documenting in the medical record, and Communicating with other healthcare professionals . This patient was discussed by the interdisciplinary team in weekly case conference today. The care of the patient was extensively discussed with all care providers and an appropriate rehabilitation plan was formulated unique for this patient. Barriers were identified preventing progression of therapy and appropriate interventions were discussed with each discipline. Please see the team note for input from all  disciplines regarding barriers, intervention, and discharge planning.        ** Please Note:  voice to text software may have been used in the creation of this document. Although proof errors in transcription or interpretation are a potential of such software**

## 2024-03-06 NOTE — PROGRESS NOTES
OT Long Term Goals       03/06/24 0700   Rehab Team Goals   ADL Team Goal Patient will be independent with ADLs with least restrictive device upon completion of rehab program  (7-10 days)   Rehab Team Interventions   OT Interventions Self Care;Home Management;Therapeutic Exercise;Community Reintegration;Cognitive Retraining;Energy Conservation;Patient/Family Education   Eating Goal   Eating Goal 06. Independent - Patient completes the activity by him/herself with no assistance from a helper.   Status Target goal - one week;Target goal - two weeks   Interventions Optimal Position   Grooming Goal   Oral Hygiene Goal 06. Independent - Patient completes the activity by him/herself with no assistance from a helper.   Status Target goal - one week;Target goal - two weeks   Intervention Assistive Device;Balance Work;Therapeutic Exercise;Tolerance Work   Tub/Shower Transfer Goal   Method Shower Stall   Assist Device Seat with Back   Status Target goal - one week;Target goal - two weeks  (supv - DRY transfer only)   Interventions ADL Training;Assistive Device   Bathing Goal   Shower/bathe self Goal 06. Independent - Patient completes the activity by him/herself with no assistance from a helper.   Status Target goal - one week;Target goal - two weeks   Intervention ADL Training;Assistive Device;Therapeutic Exercise   Upper Body Dressing Goal   Upper body dressing Goal 06. Independent - Patient completes the activity by him/herself with no assistance from a helper.   Status Target goal - one week;Target goal - two weeks   Intervention Assistive Device;Balance Work;Therapeutic Exercise;Tolerance Work   Lower Body Dressing Goal   Lower body dressing Goal 06. Independent - Patient completes the activity by him/herself with no assistance from a helper.   Putting on/taking off footwear Goal 06. Independent - Patient completes the activity by him/herself with no assistance from a helper.   Status Target goal - one week;Target goal -  two weeks   Intervention Assistive Device;Balance Work;Tolerance Work;Therapeutic Exercise   Toileting Transfer Goal   Toilet transfer Goal 05. Setup or clean-up assistance - [place] SETS UP or CLEANS UP, patient completes activity. [place] assists only prior to or following the activity.   Status Target goal - one week;Target goal - two weeks   Intervention ADL Training;Balance Work;Assistive Device   Toileting Goal   Toileting hygiene Goal 06. Independent - Patient completes the activity by him/herself with no assistance from a helper.   Status Target goal - one week;Target goal - two weeks   Meal Prep and Kitchen Mobility   Assist Level Independent   Status Target goal - one week;Target goal - two weeks  (WC level, light meal prep)   Medication Management   Assist Level Independent   Status Target goal - one week;Target goal - two weeks

## 2024-03-06 NOTE — WOUND OSTOMY CARE
Progress Note - Wound   Lona Cedeno 78 y.o. female MRN: [id number]  Unit/Bed#: [facility] 974-01 Encounter: [id number]        Assessment:   Patient is seen for wound care follow-up. RLE per podiatry recommendations. Patient now admitted back to [facility] for rehab. Wound care re-consulted for continuation of care while at [facility]. Min assist for turning and repositioning. Continent of bowel and bladder.     Findings:  Left heel is dry, intact, and pink in color- tissue blanches. Recommend continuing with silicone bordered foam dressing to area.      POA Mid Sacrum Unstageable Pressure Injury: irregular in shape area of full thickness skin loss. Measures smaller on assessment. Wound bed is 100% yellow slough tissue. Ani-wound is fragile. Scant serosanguineous drainage noted. Recommend triad paste and silicone bordered foam dressing.  Right Groin Wound: previous incision site- irregular in shape area of full thickness skin loss. Depth measures smaller than on previous assessment. Wound bed is mix of beefy red granulation tissue and yellow slough tissue. Ani-wound is fragile. Moderate to large amount of serosanguineous drainage. Recommend silver alginate and foam dressing      No induration, fluctuance, odor, warmth/temperature differences, redness, or purulence noted to the above noted wounds and skin areas assessed. New dressings applied per orders listed below. Patient tolerated well- no s/s of non-verbal pain or discomfort observed during the encounter. Bedside nurse aware of plan of care. See flow sheets for more detailed assessment findings.      Orders listed below and wound care will continue to follow, call or tiger text with questions.     Skin Care Plan:  1-Right Groin Wound: Cleanse wound with NSS and pat dry. Fluff wound bed with melgisorb Ag and cover with a silicone bordered foam dressing. Gibson with T for Treatment and change daily or PRN soilage/displacement.   2-Turn/reposition q2h or when medically stable for  pressure re-distribution on skin .  3-Elevate heels to offload pressure.  4-Moisturize skin daily with skin nourishing cream  5-Ehob cushion in chair when out of bed.  6-RLE and Right Foot: per podiatry recommendations.   7-Left Heel: Apply Silicone Border Foam (Mepilex) to areas. Gibson with P for Prevention and change every 3 days or PRN soilage/displacement. Peel back and inspect Q-shift.  8-Mid Sacrum Wound: Cleanse with soap and water. Pat dry. Apply triad paste to mid sacrum wound and cover with a Silicone Bordered Foam Dressing. Gibson with T for Treatment and change every day or PRN soilage/displacement         WOUNDS:  Wound 02/14/24 Groin Right (Active)   Wound Image   03/06/24 0820   Wound Description Beefy red;Granulation tissue;Yellow;Slough 03/06/24 0820   Ani-wound Assessment Fragile;Hyperpigmented 03/06/24 0820   Wound Length (cm) 7.5 cm 03/06/24 0820   Wound Width (cm) 0.5 cm 03/06/24 0820   Wound Depth (cm) 0.4 cm 03/06/24 0820   Wound Surface Area (cm^2) 3.75 cm^2 03/06/24 0820   Wound Volume (cm^3) 1.5 cm^3 03/06/24 0820   Calculated Wound Volume (cm^3) 1.5 cm^3 03/06/24 0820   Change in Wound Size % 85.71 03/06/24 0820   Drainage Amount Moderate 03/06/24 0820   Drainage Description Serosanguineous 03/06/24 0820   Non-staged Wound Description Full thickness 03/06/24 0820   Treatments Cleansed;Irrigation with NSS;Site care 03/06/24 0820   Dressing Calcium Alginate with Silver;Foam, Silicon (eg. Allevyn, etc) 03/06/24 0820   Dressing Changed New 03/06/24 0820   Patient Tolerance Tolerated well 03/06/24 0820   Dressing Status Dry;Intact;Clean 03/06/24 0820       Wound 02/23/24 Pressure Injury Coccyx Mid (Active)   Wound Image   03/06/24 0819   Wound Description Yellow;Slough 03/06/24 0819   Pressure Injury Stage U 03/06/24 0819   Ani-wound Assessment Pink 03/06/24 0819   Wound Length (cm) 0.5 cm 03/06/24 0819   Wound Width (cm) 0.4 cm 03/06/24 0819   Wound Depth (cm) 0.2 cm 03/06/24 0819   Wound  Surface Area (cm^2) 0.2 cm^2 03/06/24 0819   Wound Volume (cm^3) 0.04 cm^3 03/06/24 0819   Calculated Wound Volume (cm^3) 0.04 cm^3 03/06/24 0819   Change in Wound Size % 50 03/06/24 0819   Drainage Amount Scant 03/06/24 0819   Drainage Description Serosanguineous 03/06/24 0819   Non-staged Wound Description Full thickness 03/06/24 0819   Treatments Cleansed;Site care 03/06/24 0819   Dressing Foam, Silicon (eg. Allevyn, etc);Other (Comment) 03/06/24 0819   Dressing Changed Changed 03/06/24 0819   Patient Tolerance Tolerated well 03/06/24 0819   Dressing Status Intact;Dry;Clean 03/06/24 0819                Sandra Vo RN, BSN, CWOCN

## 2024-03-06 NOTE — PROGRESS NOTES
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 No  (Has parkinson's disease)   Multiple Support Systems Yes   Support System 2   Name 2 Ariela   Relationship 2 Daughter   Able to provide 24 hour supervision 2 No  (works full time, but lives close to patient)   Able to provide physical help? (2) Yes   Support System 3   Name 3 Balbina   Relationship 3 daugther   Able to provide 24 hour supervision 3 Yes   Able to provide physical help? (3) No  (intellectual disability, does assist patient with IADLs at baseline)   Home Setup   Type of Home Single Level   Method of Entry Stairs   Number of Stairs   (5 concrete steps w/ BHR + 5 wooden steps w/ BHR. Reports back entrance that has 3 PATO. pt reports family is installing a ramp. Need to follow up with Ariela. OT did make a call to [place] this AM.)   Number of Stairs in Home 0   First Floor Bathroom Full;Shower;Grab Bars;Built-in shower seat   First Floor Bathroom Accessibility Grab bars by toilet;Grab bars in tub/shower   Second Floor Bathroom None   First Floor Setup Available Yes   Available Equipment Roller Walker;Rollator;Single Point Cane;Bedside Commode   Baseline Information   Transportation    Prior Device(s) Used Roller Walker;Single Point Cane  (rollator in community, no AD in the house)   Prior Level of Function   Self-Care 3. Independent - Patient completed the activities by him/herself, with or without an assistive device, with no assistance from a helper.   Indoor-Mobility (Ambulation)  "3. Independent - Patient completed the activities by him/herself, with or without an assistive device, with no assistance from a helper.   Stairs 3. Independent - Patient completed the activities by him/herself, with or without an assistive device, with no assistance from a helper.   Functional Cognition 3. Independent - Patient completed the activities by him/herself, with or without an assistive device, with no assistance from a helper.   Prior Assistance Needed for Household Chores/Cleaning   Prior Device Used Z. None of the above   Falls in the Last Year   Number of falls in the past 12 months 1   Type of Injury Associated with Fall Injury   Restrictions/Precautions   Precautions Bed/chair alarms;Cognitive;Fall Risk;Impulsive;Contact/isolation;Supervision on toilet/commode   RLE Weight Bearing Per Order (S)  NWB   Braces or Orthoses Other (Comment)  (prevalon boot)   Pain Assessment   Pain Assessment Tool 0-10   Pain Score No Pain   Toilet Transfer   Type of Assistance Needed Physical assistance   Physical Assistance Level 26%-50%   Comment Morris for sit pivot transfer from WC<>Commode, VCs to maintain NWB RLE, required assistance to manage drop arm commode.   Toilet Transfer CARE Score 3   Toileting   Findings For toileting hygiene and clothing management, patient came to a full stand. PT instructed patient that she should only be performing a partial stand with hands on armrest of commode to allow her to maintain NWB on RLE. Patient stated, \"I am not putting weight through my RLE.\" From PT observation, patient appeared to be placing weight through LE and educated patient that even if she is resting her foot on the ground that is still pressure to that area. Pt attempted to used bilateral UEs for clothing management.  Very impulsive with toileting and high risk for falls.   Transfer Bed/Chair/Wheelchair   Type of Assistance Needed Physical assistance   Physical Assistance Level 26%-50%   Comment Morris with sit " pivot transfers, VCs to maintain NWB RLE, intermittent places foot on the ground   Chair/Bed-to-Chair Transfer CARE Score 3   Roll Left and Right   Type of Assistance Needed Supervision   Roll Left and Right CARE Score 4   Sit to Lying   Type of Assistance Needed Incidental touching   Comment HOB flat, no handrails, CGA/CS   Sit to Lying CARE Score 4   Lying to Sitting on Side of Bed   Type of Assistance Needed Incidental touching   Comment HOB flat, no handrails, CGA/CS   Lying to Sitting on Side of Bed CARE Score 4   Sit to Stand   Type of Assistance Needed Physical assistance   Physical Assistance Level 51%-75%   Comment ModA with no AD, Morris with RW, VC for NWB on RLE   Sit to Stand CARE Score 2   Picking Up Object   Reason if not Attempted Safety concerns   Picking Up Object CARE Score 88   Car Transfer   Type of Assistance Needed Physical assistance   Physical Assistance Level 26%-50%   Comment Morris for sit pivot transfer, VCs for placement of hands and to maintain NWB RLE, pt initially tried to transfer with RLE crossed over LLE.   Car Transfer CARE Score 3   Ambulation   Primary Mode of Locomotion Prior to Admission Walk   Distance Walked (feet) 8 ft  (x2)   Assist Device Parallel Bars   Gait Pattern   (Hop to)   Limitations Noted In Balance;Endurance;Safety;Strength   Provided Assistance with: Balance   Walk Assist Level Minimum Assist;Moderate Assist;Chair Follow   Findings Assessed patient ability to hop in parallel bars, Min/ModA. Patient able to maintain NWB on RLE but inconsistent when clearing LLE. WC follow for safety. Did not progress to RW due to poor L foot clearance. Will not be recommending hopping as patient's primary means of mobility upon discharge. Pt reports to access her one bathroom she will need to hop short distance. PT to reach out daughter to measurements to see if WC could access bathroom. Short distance goal of hoppping of 10 ft   Walk 10 Feet   Reason if not Attempted Safety  concerns   Walk 10 Feet CARE Score 88   Walk 50 Feet with Two Turns   Comment Did not set 50 ft ambulation goal for discharge due to safety concerns and high fall risk   Reason if not Attempted Safety concerns   Walk 50 Feet with Two Turns CARE Score 88   Walk 150 Feet   Comment Did not set 150 ft ambulation goal for discharge due to safety concerns and high fall risk   Reason if not Attempted Safety concerns   Walk 150 Feet CARE Score 88   Walking 10 Feet on Uneven Surfaces   Comment Did not set uneven surface goal for discharge due to safety concerns and high fall risk   Wheelchair mobility   Type of Wheelchair Used 1. Manual   Method Right upper extremity;Left upper extremity   Assistance Provided For Remove Leg Rest;Replace Leg Rest;Remove armrests;Replace armrests;Locking Brakes   Distance Level Surface (feet) 165 ft  (115 ft, 52 ft)   Findings PT sessions to focus on patient managing leg rest, armrest, etc.   Wheel 50 Feet with Two Turns   Type of Assistance Needed Supervision   Wheel 50 Feet with Two Turns CARE Score 4   Wheel 150 Feet   Type of Assistance Needed Supervision   Wheel 150 Feet CARE Score 4   Curb or Single Stair   Reason if not Attempted Safety concerns   1 Step (Curb) CARE Score 88   4 Steps   Reason if not Attempted Safety concerns   4 Steps CARE Score 88   12 Steps   Reason if not Attempted Safety concerns   12 Steps CARE Score 88   Stairs   Findings Per patient, family will be installing ramp   Comprehension   QI: Comprehension 4. [place]: Clear comprehension without cues or repetitions   Expression   QI: Expression 4. Express complex messages without difficulty and with speech that is clear and easy to understan   Cognition   Overall Cognitive Status Impaired   Arousal/Participation Alert;Responsive;Cooperative   Attention Attends with cues to redirect   Orientation Level Oriented X4   Memory Decreased recall of precautions   Following Commands Follows one step commands without  "difficulty   Therapeutic Exercise   Therapeutic Exercise/Activity [place] to maintain NWB RLE 3x10; supine SAQ 3# LLE 3x10, 0# RLE 3x10, SLR  3x10 each LE   Discharge Information   Vocational Plan Retired/not working   Patient's Discharge Plan DC home Mod I   Patient's Rehab Expectations \"to go home.\"   Barriers to Discharge Home Limited Family Support;Unsafe Home Setup;Decreased Cognitive Function;Decreased Strength;Decreased Endurance;Safety Considerations   Impressions Patient is a 78 year old emale who presents to [facility] following hospitalization for diabetic ulcer of the right toe and right ankle with exposed Achilles tendon. Pt underwent split thickness skin grafting which occurred on 2/28 with Dr. Yeboah. PMH significant for history of anxiety, closed fracture of the coccyx in May 2023, diabetes, GERD, hyperlipidemia, hypertension, IBS. Prior to admission, patient was independent for ADLs, + , ambulating without assistive device. Patient lives with with spouse and daughter, in a 1SH. On initial evaluation, patient presents decreased strength, imbalance, sensation deficits, decreased endurance, fatigue, decreased safety awareness, decreased insight to deficits, impulsiveness, and weight bearing restrictions resulting in increased assistance for all functional mobility. Patient requires Contact Guard  for bed mobility, Min Assistance for transfers, and Supervision for WC mobility.  Patient is high risk for falls secondary to deficits found on initial evaluation. Patient will benefit from physical therapy services to address the deficits identified on evaluation and to maximize safety and independence with all functional mobility and to decrease caregiver burden. Barriers to discharge home at this time include: limited caregiver support/ inaccessible home environment/high risk for falls/impaired cognition. Patient is a good rehabilitation candidate. Patient's estimated length of stay is 7 " to 10 days with goals set for Modified [place]. PT plan of care to focus on bed mobility/transfers/gait training/stair navigation/strength training/balance training/improving activity tolerance.   PT Therapy Minutes   PT Time In 0930   PT Time Out 1100   PT Total Time (minutes) 90   PT Mode of treatment - Individual (minutes) 90   PT Mode of treatment - Concurrent (minutes) 0   PT Mode of treatment - Group (minutes) 0   PT Mode of treatment - Co-treat (minutes) 0   PT Mode of Treatment - Total time(minutes) 90 minutes   PT Cumulative Minutes 90   Cumulative Minutes   Cumulative therapy minutes 180     Elenita Miller, PT, DPT

## 2024-03-07 LAB
GLUCOSE SERPL-MCNC: 104 MG/DL (ref 65–140)
GLUCOSE SERPL-MCNC: 155 MG/DL (ref 65–140)
GLUCOSE SERPL-MCNC: 157 MG/DL (ref 65–140)
GLUCOSE SERPL-MCNC: 91 MG/DL (ref 65–140)

## 2024-03-07 PROCEDURE — 97535 SELF CARE MNGMENT TRAINING: CPT

## 2024-03-07 PROCEDURE — 97110 THERAPEUTIC EXERCISES: CPT

## 2024-03-07 PROCEDURE — 99232 SBSQ HOSP IP/OBS MODERATE 35: CPT | Performed by: STUDENT IN AN ORGANIZED HEALTH CARE EDUCATION/TRAINING PROGRAM

## 2024-03-07 PROCEDURE — 82948 REAGENT STRIP/BLOOD GLUCOSE: CPT

## 2024-03-07 PROCEDURE — 99232 SBSQ HOSP IP/OBS MODERATE 35: CPT | Performed by: INTERNAL MEDICINE

## 2024-03-07 PROCEDURE — NC001 PR NO CHARGE: Performed by: PODIATRIST

## 2024-03-07 PROCEDURE — 97542 WHEELCHAIR MNGMENT TRAINING: CPT

## 2024-03-07 PROCEDURE — 97530 THERAPEUTIC ACTIVITIES: CPT

## 2024-03-07 RX ADMIN — CLONAZEPAM 1 MG: 1 TABLET ORAL at 17:06

## 2024-03-07 RX ADMIN — FLUTICASONE FUROATE AND VILANTEROL TRIFENATATE 1 PUFF: 200; 25 POWDER RESPIRATORY (INHALATION) at 08:44

## 2024-03-07 RX ADMIN — ACETAMINOPHEN 975 MG: 325 TABLET, FILM COATED ORAL at 21:37

## 2024-03-07 RX ADMIN — RIVAROXABAN 2.5 MG: 2.5 TABLET, FILM COATED ORAL at 08:41

## 2024-03-07 RX ADMIN — SENNOSIDES, DOCUSATE SODIUM 1 TABLET: 8.6; 5 TABLET ORAL at 08:40

## 2024-03-07 RX ADMIN — METHOCARBAMOL 250 MG: 500 TABLET ORAL at 06:02

## 2024-03-07 RX ADMIN — METFORMIN HYDROCHLORIDE 500 MG: 500 TABLET, FILM COATED ORAL at 17:06

## 2024-03-07 RX ADMIN — METFORMIN HYDROCHLORIDE 500 MG: 500 TABLET, FILM COATED ORAL at 08:49

## 2024-03-07 RX ADMIN — SENNOSIDES, DOCUSATE SODIUM 1 TABLET: 8.6; 5 TABLET ORAL at 17:06

## 2024-03-07 RX ADMIN — NICOTINE 1 PATCH: 14 PATCH, EXTENDED RELEASE TRANSDERMAL at 08:40

## 2024-03-07 RX ADMIN — Medication 1 TABLET: at 08:40

## 2024-03-07 RX ADMIN — METHOCARBAMOL 250 MG: 500 TABLET ORAL at 21:37

## 2024-03-07 RX ADMIN — CEFPODOXIME PROXETIL 400 MG: 200 TABLET, FILM COATED ORAL at 17:06

## 2024-03-07 RX ADMIN — ACETAMINOPHEN 975 MG: 325 TABLET, FILM COATED ORAL at 13:27

## 2024-03-07 RX ADMIN — OMEGA-3 FATTY ACIDS CAP 1000 MG 1000 MG: 1000 CAP at 08:41

## 2024-03-07 RX ADMIN — PANTOPRAZOLE SODIUM 40 MG: 40 TABLET, DELAYED RELEASE ORAL at 06:02

## 2024-03-07 RX ADMIN — METHOCARBAMOL 250 MG: 500 TABLET ORAL at 13:27

## 2024-03-07 RX ADMIN — BUPROPION HYDROCHLORIDE 300 MG: 150 TABLET, FILM COATED, EXTENDED RELEASE ORAL at 08:41

## 2024-03-07 RX ADMIN — CYANOCOBALAMIN TAB 500 MCG 1000 MCG: 500 TAB at 08:40

## 2024-03-07 RX ADMIN — INSULIN LISPRO 1 UNITS: 100 INJECTION, SOLUTION INTRAVENOUS; SUBCUTANEOUS at 08:49

## 2024-03-07 RX ADMIN — CEFPODOXIME PROXETIL 400 MG: 200 TABLET, FILM COATED ORAL at 08:41

## 2024-03-07 RX ADMIN — PREGABALIN 200 MG: 100 CAPSULE ORAL at 21:38

## 2024-03-07 RX ADMIN — RIVAROXABAN 2.5 MG: 2.5 TABLET, FILM COATED ORAL at 17:06

## 2024-03-07 RX ADMIN — ASPIRIN 81 MG CHEWABLE TABLET 81 MG: 81 TABLET CHEWABLE at 08:40

## 2024-03-07 RX ADMIN — PREGABALIN 200 MG: 100 CAPSULE ORAL at 08:40

## 2024-03-07 RX ADMIN — PREGABALIN 200 MG: 100 CAPSULE ORAL at 17:06

## 2024-03-07 RX ADMIN — INSULIN LISPRO 1 UNITS: 100 INJECTION, SOLUTION INTRAVENOUS; SUBCUTANEOUS at 21:46

## 2024-03-07 RX ADMIN — ACETAMINOPHEN 975 MG: 325 TABLET, FILM COATED ORAL at 06:03

## 2024-03-07 NOTE — PLAN OF CARE
Problem: PAIN - ADULT  Goal: Verbalizes/displays adequate comfort level or baseline comfort level  Description: Interventions:  - Encourage patient to monitor pain and request assistance  - Assess pain using appropriate pain scale  - Administer analgesics based on type and severity of pain and evaluate response  - Implement non-pharmacological measures as appropriate and evaluate response  - Consider cultural and social influences on pain and pain management  - Notify physician/advanced practitioner if interventions unsuccessful or patient reports new pain  Outcome: Progressing     Problem: INFECTION - ADULT  Goal: Absence or prevention of progression during hospitalization  Description: INTERVENTIONS:  - Assess and monitor for signs and symptoms of infection  - Monitor lab/diagnostic results  - Monitor all insertion sites, i.e. indwelling lines, tubes, and drains  - Monitor endotracheal if appropriate and nasal secretions for changes in amount and color  - [place] appropriate cooling/warming therapies per order  - Administer medications as ordered  - Instruct and encourage patient and family to use good hand hygiene technique  - Identify and instruct in appropriate isolation precautions for identified infection/condition  Outcome: Progressing     Problem: SAFETY ADULT  Goal: Patient will remain free of falls  Description: INTERVENTIONS:  - Educate patient/family on patient safety including physical limitations  - Instruct patient to call for assistance with activity   - Consult OT/PT to assist with strengthening/mobility   - Keep Call bell within reach  - Keep bed low and locked with side rails adjusted as appropriate  - Keep care items and personal belongings within reach  - Initiate and maintain comfort rounds  - Make Fall Risk Sign visible to staff  - Apply yellow socks and bracelet for high fall risk patients  - Consider moving patient to room near nurses station  Outcome: Progressing  Goal: Maintain or  return to baseline ADL function  Description: INTERVENTIONS:  -  Assess patient's ability to carry out ADLs; assess patient's baseline for ADL function and identify physical deficits which impact ability to perform ADLs (bathing, care of mouth/teeth, toileting, grooming, dressing, etc.)  - Assess/evaluate cause of self-care deficits   - Assess range of motion  - Assess patient's mobility; develop plan if impaired  - Assess patient's need for assistive devices and provide as appropriate  - Encourage maximum independence but intervene and supervise when necessary  - Involve family in performance of ADLs  - Assess for home care needs following discharge   - Consider OT consult to assist with ADL evaluation and planning for discharge  - Provide patient education as appropriate  Outcome: Progressing  Goal: Maintains/Returns to pre admission functional level  Description: INTERVENTIONS:  - Perform AM-PAC 6 Click Basic Mobility/ Daily Activity assessment daily.  - Set and communicate daily mobility goal to care team and patient/family/caregiver.   - Collaborate with rehabilitation services on mobility goals if consulted  - Out of bed for toileting  - Record patient progress and toleration of activity level   Outcome: Progressing

## 2024-03-07 NOTE — PROGRESS NOTES
PM&R PROGRESS NOTE:  Lona Cedeno 78 y.o. female MRN: [id number]  Unit/Bed#: -01 Encounter: [id number]    Rehab Diagnosis: Impairment of mobility, safety and Activities of Daily Living (ADLs) due to Other Disabling Impairments:  13  Other Disabling Impairments     Etiologic: Diabetic foot ulcer with osteomyelitis s/p fem bypass, right foot wound and achilles debridement with 2nd toe partial amputation and now s/p STSG   Date of Onset: 2/26/24     Date of surgery: 1/31, 2/4, 2/14, 2/28     History of Present Illness:   Lona Cedeno is a 78 y.o. female with history of anxiety, closed fracture of the coccyx in May 2023, diabetes, GERD, hyperlipidemia, hypertension, IBS who presented to the [facility] on 1/23 from the podiatry office with a diabetic ulcer of the right toe and right ankle with exposed Achilles tendon.  She was stabbed have osteomyelitis of the right foot transferred to [facility] to reconsider vascularization and surgical options.  She is recommended to undergo an extra-anatomic bypass.  There was discussion about right transtibial amputation however decision was made for attempted limb salvage.  She underwent the above bypass on 1/31/2024 as well as on 2/4/2024 and underwent right toe and heel wound debridement with cycle and partial amputation of the toe and VAC placement.  Her course was complicated by arrest chest wall hematoma s/p evacuation on 2/4 with vascular.  On 2/13 the patient needed discharge back to acute care in the setting of fevers and leukocytosis from a potential infectious standpoint.  She was also given a discharge from the [facility] to acute care setting after podiatry wanted to take the patient back for split thickness skin grafting which occurred on 2/28 with Dr. Yeboah.  Patient was transition to oral antibiotics and wound vacs have been removed.  She remains nonweightbearing to the right lower extremity. The patient was evaluated by  the Rehabilitation team and deemed an appropriate candidate for comprehensive inpatient rehabilitation and admitted to the [facility] on 3/5/2024  2:03 PM    SUBJECTIVE: Patient seen and evaluated in therapy session.  Overall is feeling well I did have the team come see her leg today.  Podiatry states that the dressings have been changed and staples in the medial ankle have been removed with plans for local wound care and to continue nonweightbearing status.  Patient is for discharge next week and at this time denies any fever, chills, nausea, vomiting, cough questions breath, diarrhea constipation.  She still needing some cues for safety and following directions for safe transfers at times.  Continues to work with therapy.    ASSESSMENT: Stable, progressing, therapy evaluations      PLAN:    Rehabilitation  Functional deficits: Self-care and mobility  Continue current rehabilitation plan of care to maximize function.    Functional update:   PT: Min to mod assist for transfers, supervision for bed mobility and able to hop 8 feet in parallel bars using moderate assist maintaining nonweightbearing  OT: Hygiene moderate assist, eating set up, bathing moderate assist, dressing moderate assist    Estimated Discharge: Estimated 3/14    DVT prophylaxis  On Xarelto     Pain  Acetaminophen 975 mg every 8 hours  Lyrica 200 mg 3 times daily  Robaxin 250 mg every 8 hours  Oxycodone 2.5-5 mg every 4 hours as needed     Bladder plan  Continent     Bowel plan  Continent  Last bowel movement on 3/5     Code Status  Level 3 DNAR/DNI      * Surgical site infection  Assessment & Plan  Needed treatment with cefepime for surgical site infection however also on vancomycin which was discontinued on 2/16  Right groin breakdown status post wound washout with VAC placement on 2/14 as well as 2/19  Was followed by infectious disease and continued on cefepime with plan to transition to cefpodoxime 400 mg twice daily for 4 weeks.  Cultures were  positive for Klebsiella and Proteus  VAC to the right groin as well as the foot have since been discontinued and continues for cefpodoxime for antibiotic treatment  Maintain nonweightbearing to the right lower extremity  Underwent split thickness skin grafting with podiatry on 2/28 for the right foot  Due to the surgical site infection, sepsis and requirement for multiple trips to the OR for wound debridement, VAC placement, split-thickness grafting patient is not at her functional baseline and would benefit from daily physician oversight and rehabilitation nursing as well as aggressive physical and occupational therapy under guidance of physical medicine and rehabilitation specialist.  Patient will tolerate this 3 hours/day 5 to 7 days/week with ultimate discharge goal for the community    Impulsive  Assessment & Plan  Had impulsive behaviors during prior admissions and discussed overall behavioral plan.  Patient on last admission was impulsive and not using call bell appropriately as well as not following weightbearing restrictions  Discussed with patient again however continued vigilance with nursing and therapy checks, alarms and relatively sensitive settings and would benefit from Q 2-hour bowel bladder checks    Sacral wound  Assessment & Plan  Unstageable wound on the sacrum on initial evaluations, POA  Consult wound care for management  Vigilant turns in bed and weight shifts, daily sacral checks by nursing    Acute pain due to injury  Assessment & Plan  Acetaminophen 975 mg every 8 hours  Lyrica 200 mg 3 times daily  Robaxin 250 mg every 8 hours  Oxycodone 2.5-5 mg every 4 hours as needed    Impaired mobility and activities of daily living  Assessment & Plan  - Rehabilitation medicine physician for daily monitoring of care, 24 hour availability for acute  medical issues, medication management, and therapeutic and diagnostic assessments.  - 24 hour rehabilitation nursing 7 days per week for:  management/teaching of medications,  bowel/bladder routine, skin care.  - PT, OT for 2-3 hours per day, 5 to 6 days per week; 15 hours per week  - Rehabilitation Psychology for adjustment and coping  - MSW for barriers to discharge, community resources, and family support  - Discharge planning following to help ensure a safe and efficient discharge    Anemia  Assessment & Plan  Most recent hemoglobin 11.6 which is just within the normal range but has been overall improving  Continue to follow biweekly labs or sooner if clinically indicated as patient is overall at risk for drop in hemoglobin  On review of the last 2 weeks of labs has improved slightly from the mid to upper 10s  Continue B12 supplementation    Diabetic foot ulcer with osteomyelitis ([facility])  Assessment & Plan  Patient presented with right posterior ankle ulceration with exposed Achilles tendon and required foot debridement on 2/4/2024 with VAC placement with right medial ankle ulcer, right second toe osteomyelitis status post right second toe amputation  Wound vacs have since been discontinued and continues to be followed by podiatry  Require going back to the OR on 2/28 for split-thickness grafting  Remains nonweightbearing to the right lower extremity      Tobacco use disorder  Assessment & Plan  Smoking cessation counseling  Had been on nicotine patch 21 mg / 24-hour, however decreased to 14 mg    COPD, severity to be determined ([facility])  Assessment & Plan  Continue inhalers per internal medicine including albuterol as needed as well as fluticasone-vilanterol 1 puff daily  Monitor oxygen saturations and therapy    PAD (peripheral artery disease) ([facility])  Assessment & Plan  Patient was on aspirin, statin and low-dose Xarelto 2.5 mg twice daily  Status post 1/31 bypass axillary-femoral, right with 8 mm ringed PTFE graft    GERD without esophagitis  Assessment & Plan  Continue Protonix    Anxiety and depression  Assessment & Plan  Continue Wellbutrin   mg daily as well as Klonopin  Supportive counseling and consider neuropsychology if indicated and patient agreeable    Essential hypertension  Assessment & Plan  Monitor pressures in therapy however has been off of regular scheduled antihypertensives  Ordered hydralazine as needed for systolic blood pressure greater than 160    Type 2 diabetes mellitus with diabetic polyneuropathy (HCC)  Assessment & Plan  Lab Results   Component Value Date    HGBA1C 6.0 (H) 02/28/2024       Recent Labs     03/06/24  1604 03/06/24  2115 03/07/24  0652 03/07/24  1111   POCGLU 79 140 155* 91     Has been on metformin as an outpatient but held while inpatient and currently on a sliding scale regimen.  Monitor per internal medicine and make adjustments as needed  Glycemic control has been good we will need to continue to for appropriate wound healing potential          Appreciate IM consultants medical co-management.  Labs, medications, and imaging personally reviewed.      ROS:  A ten point review of systems was completed on 03/07/24 and pertinent positives are listed in subjective section. All other systems reviewed were negative.       OBJECTIVE:   /80 (BP Location: Left arm)   Pulse 74   Temp 97.8 °F (36.6 °C) (Oral)   Resp 18   Wt 69.5 kg (153 lb 3.5 oz)   SpO2 93%   BMI 28.95 kg/m²     Physical Exam  Vitals reviewed.   Constitutional:       General: She is not in acute distress.  HENT:      Head: Normocephalic and atraumatic.      Right Ear: External ear normal.      Left Ear: External ear normal.      Nose: Nose normal. No rhinorrhea.      Mouth/Throat:      Mouth: Mucous membranes are moist.      Pharynx: Oropharynx is clear.   Eyes:      General: No scleral icterus.  Cardiovascular:      Rate and Rhythm: Normal rate.   Pulmonary:      Effort: Pulmonary effort is normal. No respiratory distress.   Abdominal:      General: There is no distension.      Palpations: Abdomen is soft.   Musculoskeletal:      Cervical back:  Normal range of motion.      Right lower leg: Edema present.      Left lower leg: No edema.      Comments: Ace wrap on the right lower extremity with Prevalon boot in place.   Skin:     General: Skin is warm and dry.   Neurological:      Mental Status: She is alert and oriented to person, place, and time.      Motor: No weakness.   Psychiatric:         Mood and Affect: Mood normal.         Behavior: Behavior normal.          Lab Results   Component Value Date    WBC 9.33 03/04/2024    HGB 11.6 03/04/2024    HCT 37.9 03/04/2024     (H) 03/04/2024     03/04/2024     Lab Results   Component Value Date    SODIUM 137 03/04/2024    K 4.1 03/04/2024     03/04/2024    CO2 30 03/04/2024    BUN 21 03/04/2024    CREATININE 0.47 (L) 03/04/2024    GLUC 161 (H) 03/04/2024    CALCIUM 9.3 03/04/2024     Lab Results   Component Value Date    INR 1.16 02/14/2024    INR 1.03 02/04/2024    INR 1.11 02/03/2024    PROTIME 14.7 (H) 02/14/2024    PROTIME 13.4 02/04/2024    PROTIME 14.2 02/03/2024           Current Facility-Administered Medications:     acetaminophen (TYLENOL) tablet 975 mg, 975 mg, Oral, Q8H JILLIAN, Vanesa Sheehan, CRNP, 975 mg at 03/07/24 0603    albuterol (PROVENTIL HFA,VENTOLIN HFA) inhaler 2 puff, 2 puff, Inhalation, Q6H PRN, Vanesa Sheehan, CRNP    aspirin chewable tablet 81 mg, 81 mg, Oral, Daily, Vanesa Sheehan, CRNP, 81 mg at 03/07/24 0840    buPROPion (WELLBUTRIN XL) 24 hr tablet 300 mg, 300 mg, Oral, Daily, Vanesa Sheehan, CRNP, 300 mg at 03/07/24 0841    cefpodoxime (VANTIN) tablet 400 mg, 400 mg, Oral, BID With Meals, Vanesa Sheehan, CRNP, 400 mg at 03/07/24 0841    clonazePAM (KlonoPIN) tablet 1 mg, 1 mg, Oral, QPM, Vanesa Sheehan, CRNP, 1 mg at 03/06/24 1652    cyanocobalamin (VITAMIN B-12) tablet 1,000 mcg, 1,000 mcg, Oral, Daily, Vanesa Sheehan, CRNP, 1,000 mcg at 03/07/24 0840    fish oil capsule 1,000 mg, 1,000 mg, Oral, Daily, Vanesa Sheehan, CRNP, 1,000 mg at 03/07/24 0841    fluticasone-vilanterol  200-25 mcg/actuation 1 puff, 1 puff, Inhalation, Daily, Vanesa Sheehan, CRNP, 1 puff at 03/07/24 0844    hydrALAZINE (APRESOLINE) tablet 25 mg, 25 mg, Oral, Q8H PRN, Vanesa Sheehan, CRNP    insulin lispro (HumALOG/ADMELOG) 100 units/mL subcutaneous injection 1-5 Units, 1-5 Units, Subcutaneous, TID AC, 1 Units at 03/07/24 0849 **AND** Fingerstick Glucose (POCT), , , TID AC, Vanesa Sheehan, CRNP    insulin lispro (HumALOG/ADMELOG) 100 units/mL subcutaneous injection 1-5 Units, 1-5 Units, Subcutaneous, HS, Vanesa Sheehan, CRNP, 1 Units at 03/05/24 2122    metFORMIN (GLUCOPHAGE) tablet 500 mg, 500 mg, Oral, BID With Meals, Vanesa Sheehan, CRNP, 500 mg at 03/07/24 0849    methocarbamol (ROBAXIN) tablet 250 mg, 250 mg, Oral, Q8H JILLIAN, Vanesa Sheehan, CRNP, 250 mg at 03/07/24 0602    multivitamin-minerals (CENTRUM) tablet 1 tablet, 1 tablet, Oral, Daily, Vanesa Sheehan, CRNP, 1 tablet at 03/07/24 0840    nicotine (NICODERM CQ) 14 mg/24hr TD 24 hr patch 1 patch, 1 patch, Transdermal, Daily, Vanesa Sheehan, CRNP, 1 patch at 03/07/24 0840    oxyCODONE (ROXICODONE) split tablet 2.5 mg, 2.5 mg, Oral, Q4H PRN **OR** oxyCODONE (ROXICODONE) IR tablet 5 mg, 5 mg, Oral, Q4H PRN, Vanesa Sheehan, CRNP, 5 mg at 03/06/24 2014    pantoprazole (PROTONIX) EC tablet 40 mg, 40 mg, Oral, Early Morning, Vanesa Sheehan, CRNP, 40 mg at 03/07/24 0602    polyethylene glycol (MIRALAX) packet 17 g, 17 g, Oral, Daily, Vanesa Sheehan, CRNP, 17 g at 03/06/24 0805    pregabalin (LYRICA) capsule 200 mg, 200 mg, Oral, TID, Vanesa Sheehan, CRNP, 200 mg at 03/07/24 0840    rivaroxaban (XARELTO) tablet 2.5 mg, 2.5 mg, Oral, BID, Vanesa Sheehan, CRNP, 2.5 mg at 03/07/24 0841    senna-docusate sodium (SENOKOT S) 8.6-50 mg per tablet 1 tablet, 1 tablet, Oral, BID, Vanesa Sheehan, CRNP, 1 tablet at 03/07/24 0840    Past Medical History:   Diagnosis Date    Anxiety     Closed fracture of coccyx (HCC) 05/24/2023    Diabetes mellitus (HCC)     GERD (gastroesophageal reflux disease)      Hyperlipidemia     Hypertension     IBS (irritable bowel syndrome)     Shoulder fracture        Patient Active Problem List    Diagnosis Date Noted    Surgical site infection 02/13/2024    Sacral wound 02/23/2024    Impulsive 02/23/2024    Other dietary vitamin B12 deficiency anemia 02/16/2024    Leukocytosis 02/13/2024    Sepsis without acute organ dysfunction (HCC) 02/13/2024    At risk for venous thromboembolism (VTE) 02/09/2024    At high risk for skin breakdown 02/09/2024    Wound of skin 02/09/2024    Impaired mobility and activities of daily living 02/09/2024    Acute pain due to injury 02/09/2024    Hematoma 02/06/2024    Anemia 02/06/2024    Constipation 01/26/2024    Preoperative cardiovascular examination 01/25/2024    Diabetic foot ulcer with osteomyelitis (HCC) 01/23/2024    Ankle ulcer, right, with fat layer exposed ([facility]) 12/14/2023    Age-related osteoporosis without current pathological fracture 11/28/2023    Abnormal CT of the chest 10/17/2023    COPD, severity to be determined ([facility]) 10/17/2023    Tobacco use disorder 10/17/2023    PAD (peripheral artery disease) ([facility]) 10/10/2023    Bladder neoplasm 09/11/2023    Left renal mass 09/11/2023    Lactic acidosis 08/11/2023    Hypomagnesemia 08/11/2023    Degenerative lumbar disc 05/24/2023    Urinary incontinence 04/13/2023    GERD without esophagitis 10/16/2019    Essential hypertension 02/15/2019    Anxiety and depression 02/15/2019    Type 2 diabetes mellitus with diabetic polyneuropathy (HCC) 02/12/2019          Rigo Hoover,   Physical Medicine and Rehabilitation  [facility]    I have spent a total time of 35 minutes on 03/07/24 in caring for this patient including Counseling / Coordination of care, Documenting in the medical record, and Communicating with other healthcare professionals .      ** Please Note:  voice to text software may have been used in the creation of this document. Although proof errors in  transcription or interpretation are a potential of such software**

## 2024-03-07 NOTE — PLAN OF CARE
Problem: PAIN - ADULT  Goal: Verbalizes/displays adequate comfort level or baseline comfort level  Description: Interventions:  - Encourage patient to monitor pain and request assistance  - Assess pain using appropriate pain scale  - Administer analgesics based on type and severity of pain and evaluate response  - Implement non-pharmacological measures as appropriate and evaluate response  - Consider cultural and social influences on pain and pain management  - Notify physician/advanced practitioner if interventions unsuccessful or patient reports new pain  Outcome: Progressing     Problem: INFECTION - ADULT  Goal: Absence or prevention of progression during hospitalization  Description: INTERVENTIONS:  - Assess and monitor for signs and symptoms of infection  - Monitor lab/diagnostic results  - Monitor all insertion sites, i.e. indwelling lines, tubes, and drains  - Monitor endotracheal if appropriate and nasal secretions for changes in amount and color  - [place] appropriate cooling/warming therapies per order  - Administer medications as ordered  - Instruct and encourage patient and family to use good hand hygiene technique  - Identify and instruct in appropriate isolation precautions for identified infection/condition  Outcome: Progressing     Problem: SAFETY ADULT  Goal: Patient will remain free of falls  Description: INTERVENTIONS:  - Educate patient/family on patient safety including physical limitations  - Instruct patient to call for assistance with activity   - Consult OT/PT to assist with strengthening/mobility   - Keep Call bell within reach  - Keep bed low and locked with side rails adjusted as appropriate  - Keep care items and personal belongings within reach  - Initiate and maintain comfort rounds  - Make Fall Risk Sign visible to staff  - Offer Toileting every 2 Hours, in advance of need  - Initiate/Maintain bed alarm  - Obtain necessary fall risk management equipment: non skid socks  - Apply  yellow socks and bracelet for high fall risk patients  - Consider moving patient to room near nurses station  Outcome: Progressing  Goal: Maintain or return to baseline ADL function  Description: INTERVENTIONS:  -  Assess patient's ability to carry out ADLs; assess patient's baseline for ADL function and identify physical deficits which impact ability to perform ADLs (bathing, care of mouth/teeth, toileting, grooming, dressing, etc.)  - Assess/evaluate cause of self-care deficits   - Assess range of motion  - Assess patient's mobility; develop plan if impaired  - Assess patient's need for assistive devices and provide as appropriate  - Encourage maximum independence but intervene and supervise when necessary  - Involve family in performance of ADLs  - Assess for home care needs following discharge   - Consider OT consult to assist with ADL evaluation and planning for discharge  - Provide patient education as appropriate  Outcome: Progressing  Goal: Maintains/Returns to pre admission functional level  Description: INTERVENTIONS:  - Perform AM-PAC 6 Click Basic Mobility/ Daily Activity assessment daily.  - Set and communicate daily mobility goal to care team and patient/family/caregiver.   - Collaborate with rehabilitation services on mobility goals if consulted  - Perform Range of Motion 3 times a day.  - Reposition patient every 2 hours.  - Dangle patient 3 times a day  - Stand patient 3 times a day  - Ambulate patient 3 times a day  - Out of bed to chair 3 times a day   - Out of bed for meals 3 times a day  - Out of bed for toileting  - Record patient progress and toleration of activity level   Outcome: Progressing     Problem: DISCHARGE PLANNING  Goal: Discharge to home or other facility with appropriate resources  Description: INTERVENTIONS:  - Identify barriers to discharge w/patient and caregiver  - Arrange for needed discharge resources and transportation as appropriate  - Identify discharge learning needs  (meds, wound care, etc.)  - Arrange for interpretive services to assist at discharge as needed  - Refer to Case Management Department for coordinating discharge planning if the patient needs post-hospital services based on physician/advanced practitioner order or complex needs related to functional status, cognitive ability, or social support system  Outcome: Progressing     Problem: Prexisting or High Potential for Compromised Skin Integrity  Goal: Skin integrity is maintained or improved  Description: INTERVENTIONS:  - Identify patients at risk for skin breakdown  - Assess and monitor skin integrity  - Assess and monitor nutrition and hydration status  - Monitor labs   - Assess for incontinence   - Turn and reposition patient  - Assist with mobility/ambulation  - Relieve pressure over bony prominences  - Avoid friction and shearing  - Provide appropriate hygiene as needed including keeping skin clean and dry  - Evaluate need for skin moisturizer/barrier cream  - Collaborate with interdisciplinary team   - Patient/family teaching  - Consider wound care consult   Outcome: Progressing

## 2024-03-07 NOTE — PHYSICAL THERAPY NOTE
Spoke with Lona Hastings's daughter, Ariela, regarding patient's current functional status, PT plan of care, and estimated length of stay. Ariela confirmed that family will be installing a ramp via the back entrance this weekend. Family Training to occur on Saturday from 1 pm-2pm. Main focus will be car transfer training. PT and patient to meet family down in the main lobby that day. Ariela to send pictures and measurements of home door ways to Lona Hastings's phone so PT can ensure WC will in the home.

## 2024-03-07 NOTE — PROGRESS NOTES
[facility]  Progress Note  Name: Lona Cedeno I  MRN: [id number]  Unit/Bed#: -01 I Date of Admission: 3/5/2024   Date of Service: 3/7/2024 I Hospital Day: 2    Assessment/Plan   * Surgical site infection  Assessment & Plan  S/p right axilla/femoral bypass 1/31/2024  Postop infection s/p washout/debridement 2/14 and 2/19  VAC removed 2/28/2024  Continue oral antibiotics through 3/22/2024  Cultures positive Klebsiella/Proteus  Continue local dressing changes  Will consult vascular surgery if needed for local care  Refer to media    Diabetic foot ulcer with osteomyelitis (HCC)  Assessment & Plan  Lab Results   Component Value Date    HGBA1C 6.0 (H) 02/28/2024       Recent Labs     03/06/24  1106 03/06/24  1604 03/06/24 2115 03/07/24  0652   POCGLU 143* 79 140 155*         Blood Sugar Average: Last 72 hrs:  (P) 140.2890104988001828  S/p medial ulcer debridement/partial second toe amputation 2/4/2024  Recent STSG on 2/28/2024 with VAC removal 3/4/2024  Continue local care  NWB  podiatry following for management  PMR managing therapy/pain      Tobacco use disorder  Assessment & Plan  Continue nicotine patch  Recommend cessation    PAD (peripheral artery disease) ([facility])  Assessment & Plan  S/p right axillo-femoral bypass 1/31/2024  Continue aspirin/statin/low-dose Xarelto    Essential hypertension  Assessment & Plan  Hydralazine as needed.  Was taking Lisinopril 5mg daily and amiloride 5mg daily prior to admission.  stable    Type 2 diabetes mellitus with diabetic polyneuropathy (HCC)  Assessment & Plan  Lab Results   Component Value Date    HGBA1C 6.0 (H) 02/28/2024       Recent Labs     03/06/24  1106 03/06/24  1604 03/06/24 2115 03/07/24  0652   POCGLU 143* 79 140 155*         Blood Sugar Average: Last 72 hrs:  (P) 140.9496881582148086  Home: Takes metformin 1000 mg twice daily/Januvia 100 mg at lunchtime  Here: Sliding scale/DM diet/metformin 500mg bid  Continue metformin  as above and move towards getting back to oral meds             The above assessment and plan was reviewed and updated as determined by my evaluation of the patient on 3/7/2024.    Labs:   Results from last 7 days   Lab Units 03/04/24  0540 03/03/24  0631   WBC Thousand/uL 9.33 8.83   HEMOGLOBIN g/dL 11.6 11.1*   HEMATOCRIT % 37.9 35.4   PLATELETS Thousands/uL 280 277     Results from last 7 days   Lab Units 03/04/24  0540 03/03/24  0631   SODIUM mmol/L 137 140   POTASSIUM mmol/L 4.1 4.2   CHLORIDE mmol/L 101 103   CO2 mmol/L 30 31   BUN mg/dL 21 17   CREATININE mg/dL 0.47* 0.48*   CALCIUM mg/dL 9.3 9.5             Results from last 7 days   Lab Units 03/07/24  0652 03/06/24  2115 03/06/24  1604   POC GLUCOSE mg/dl 155* 140 79       Imaging  No orders to display       Review of Scheduled Meds:  Current Facility-Administered Medications   Medication Dose Route Frequency Provider Last Rate    acetaminophen  975 mg Oral Q8H JILLIAN Vanesa Sheehan, CRNP      albuterol  2 puff Inhalation Q6H PRN Vanesa Sheehan, CRNP      aspirin  81 mg Oral Daily Vanesa Sheehan, CRNP      buPROPion  300 mg Oral Daily Vanesa Sheehan, CRNP      cefpodoxime  400 mg Oral BID With Meals Vanesa Sheehan, CRNP      clonazePAM  1 mg Oral QPM Vanesa Sheehan, CRNP      cyanocobalamin  1,000 mcg Oral Daily Vanesa Sheehan, CRNP      fish oil  1,000 mg Oral Daily Vanesa Sheehan, CRNP      fluticasone-vilanterol  1 puff Inhalation Daily Vanesa Sheehan, CRNP      hydrALAZINE  25 mg Oral Q8H PRN Vanesa Sheehan, CRNP      insulin lispro  1-5 Units Subcutaneous TID AC Vanesa Sheehan, CRNP      insulin lispro  1-5 Units Subcutaneous HS Vanesa Sheehan, CRNP      metFORMIN  500 mg Oral BID With Meals Vanesa Sheehan, CRNP      methocarbamol  250 mg Oral Q8H JILLIAN Vanesa Sheehan, CRNP      multivitamin-minerals  1 tablet Oral Daily Vanesa Sheehan, CRNP      nicotine  1 patch Transdermal Daily Vanesa Sheehan, CRNP      oxyCODONE  2.5 mg Oral Q4H PRN Vanesa Sheehan, CRNP      Or    oxyCODONE  5 mg Oral  Q4H PRN Vanesa Sheehan, CRNP      pantoprazole  40 mg Oral Early Morning Vanesa Sheehan, CRNP      polyethylene glycol  17 g Oral Daily Vanesa Sheehan, CRNP      pregabalin  200 mg Oral TID Vanesa Sheehan, CRNP      rivaroxaban  2.5 mg Oral BID Vanesa Sheehan, CRNP      senna-docusate sodium  1 tablet Oral BID Vanesa Sheehan, CRNP         Subjective/ HPI: Patient seen and examined. Patients overnight issues or events were reviewed with nursing staff. New or overnight issues include the following:     Pt seen in PT. She denies any current complaints.    ROS:   A 10 point ROS was performed; negative except as noted above.        *Labs /Radiology studies Reviewed  *Medications  reviewed and reconciled as needed  *Please refer to order section for additional ordered labs studies      Physical Examination:  Vitals:   Vitals:    03/06/24 0543 03/06/24 1334 03/06/24 1951 03/07/24 0533   BP: 136/62 147/62 126/60 125/80   BP Location: Left arm Left arm Left arm Left arm   Pulse: 75 74 72 74   Resp: 18 18 18 18   Temp: 97.6 °F (36.4 °C) 98 °F (36.7 °C) 97.9 °F (36.6 °C) 97.8 °F (36.6 °C)   TempSrc: Oral  Oral Oral   SpO2: 93% 92% 93% 93%   Weight: 69.5 kg (153 lb 3.5 oz)          General Appearance: NAD; pleasant  HEENT: PERRLA, conjuctiva normal; mucous membranes moist; face symmetrical  Neck:  Supple  Lungs: clear bilaterally, normal respiratory effort, no retractions, expiratory effort normal, on room air  CV: regular rate and rhythm, no murmurs rubs or gallops noted   ABD: soft non tender, +BS x4  EXT: DP pulses intact, no lymphadenopathy, no edema  Skin: normal turgor, normal texture, no rash. Rt LE ACE wrap and boot in place.  Psych: affect normal, mood normal  Neuro: AAOx3       The above physical exam was reviewed and updated as determined by my evaluation of the patient on 3/7/2024.    Invasive Devices       Peripheral Intravenous Line  Duration             Peripheral IV 03/05/24 Dorsal (posterior);Left Wrist 1 day               Drain  Duration             Ureteral Internal Stent Left ureter 6 Fr. 108 days                       VTE Pharmacologic Prophylaxis: Xarelto  Code Status: Level 3 - DNAR and DNI  Current Length of Stay: 2 day(s)    Total floor / unit time spent today 30 minutes  Coordination of patient's care was performed in conjunction with primary service. Time invested included review of patient's labs, vitals, and management of their comorbidities with continued monitoring, examination of patient as well as answering patient questions, documenting her findings and creating progress note in electronic medical record,  ordering appropriate diagnostic testing.       ** Please Note:  voice to text software may have been used in the creation of this document. Although proof errors in transcription or interpretation are a potential of such software**

## 2024-03-07 NOTE — PROGRESS NOTES
[facility] Podiatry - Progress Note  Patient: Lona Cedeno 78 y.o. female   MRN: [id number]  PCP: Vivek Preston, DO  Unit/Bed#: -01 Encounter: [id number]  Date Of Visit: 03/07/24    ASSESSMENT:    Lona Cedeno is a 78 y.o. female with:    Right posterior ankle ulcer with exposed achilles tendon  - Right wound debridement (DOS: 2/4/24)  - Right STSG application (DOS: 2/28/24)  Right medial ankle ulcer, limited to breakdown of skin  - Right STSG application (DOS: 2/28/24)   Osteomyelitis of right second toe  - Right 2nd toe amputation (DOS: 2/4/24)  T2DM  PAD  Tobacco use disorder  Diabetic polyneuropathy    PLAN:    Dressings changed at bedside. Right lower extremity surgical sites are stable, posterior ankle wound appears granular with partial take of STSG, medial ankle showing evidence of epithelization. Donor site is granular and stable No acute clinical signs of infection, suspect surrounding erythema is due to mild irritation with dressings and staples.   Removed all staples to medial ankle surgical site, removed a few staples to posterior ankle surgical site with majority of staples still intact  Continue to closely monitor surgical sites for integration of STSG with dressings changes   Continue local wound care of adaptic, wet to dry dressing to right lower extremity STSG surgical sites and maxorb/tegaderm to donor site. Podiatry to do surgical site STSG dressing changes, nursing can perform dressing changes to donor site ONLY. Wound care instructions placed for donor site.  Elevation on green foam wedges or pillows when non-ambulatory.  Rest of care per primary team.    Weight bearing status: Nonweightbearing to right lower extremity    SUBJECTIVE:     The patient was seen, evaluated, and assessed at bedside today. The patient was awake, alert, and in no acute distress. No acute events overnight. The patient reports no pain at this time in her right lower extremity. Patient denies  N/V/F/chills/SOB/CP.    OBJECTIVE:     Vitals:   /80 (BP Location: Left arm)   Pulse 74   Temp 97.8 °F (36.6 °C) (Oral)   Resp 18   Wt 69.5 kg (153 lb 3.5 oz)   SpO2 93%   BMI 28.95 kg/m²     Temp (24hrs), Av.9 °F (36.6 °C), Min:97.8 °F (36.6 °C), Max:98 °F (36.7 °C)      Physical Exam:     General:  Alert, cooperative, and in no distress.  Lower extremity exam:  Cardiovascular status at baseline.  Neurological status at baseline.  Musculoskeletal status at baseline. No calf tenderness noted.     Lower extremity wound(s) as noted below:  Wound #: 1  Location: right posterior ankle  Length 5.8cm: Width 3cm: Depth 0.1cm:   Deepest Tissue Noted in Base: subcutaneous  Probe to Bone: No  Peripheral Skin Description: Attached  Granulation: 90% Fibrotic Tissue: 10% Necrotic Tissue: 0%   Drainage Amount: minimal, serous  Signs of Infection: No  Staples remain intact, partial take of STSG    Wound #: 2  Location: right medial ankle  Length 2.4cm: Width 1cm: Depth 0.1cm:   Deepest Tissue Noted in Base: dermis  Probe to Bone: No  Peripheral Skin Description: Attached  Granulation: 80% Fibrotic Tissue: 20% Necrotic Tissue: 0%   Drainage Amount: minimal, serous  Signs of Infection: No  Staples removed, epithelization noted to wound base     Wound #: 3  Location: right STSG donor site on medial calf  Length 7cm: Width 4cm: Depth 0.1cm:   Deepest Tissue Noted in Base: dermis  Probe to Bone: No  Peripheral Skin Description: Attached  Granulation: 100% Fibrotic Tissue: 0% Necrotic Tissue: 0%   Drainage Amount: minimal, serous  Signs of Infection: No      Clinical Images 24:                Additional Data:     Labs:    Results from last 7 days   Lab Units 24  0540   WBC Thousand/uL 9.33   HEMOGLOBIN g/dL 11.6   HEMATOCRIT % 37.9   PLATELETS Thousands/uL 280     Results from last 7 days   Lab Units 24  0540   POTASSIUM mmol/L 4.1   CHLORIDE mmol/L 101   CO2 mmol/L 30   BUN mg/dL 21   CREATININE mg/dL  "0.47*   CALCIUM mg/dL 9.3           * I Have Reviewed All Lab Data Listed Above.    Recent Cultures (last 7 days):               Imaging: I have personally reviewed pertinent films in PACS  EKG, Pathology, and Other Studies: I have personally reviewed pertinent reports.      ** Please Note: Portions of the record may have been created with voice recognition software. Occasional wrong word or \"sound a like\" substitutions may have occurred due to the inherent limitations of voice recognition software. Read the chart carefully and recognize, using context, where substitutions have occurred. **      "

## 2024-03-07 NOTE — PROGRESS NOTES
"   03/07/24 1230   Pain Assessment   Pain Assessment Tool 0-10   Pain Score No Pain   Restrictions/Precautions   Precautions Bed/chair alarms;Cognitive;Fall Risk;Supervision on toilet/commode   RLE Weight Bearing Per Order NWB   Braces or Orthoses Other (Comment)  (prevalon boot on at all times; R Foot)   Subjective   Subjective \"i must have fallen asleep from this morning's therapy\"   Roll Left and Right   Type of Assistance Needed Set-up / clean-up   Roll Left and Right CARE Score 5   Sit to Lying   Type of Assistance Needed Set-up / clean-up   Sit to Lying CARE Score 5   Lying to Sitting on Side of Bed   Type of Assistance Needed Set-up / clean-up   Lying to Sitting on Side of Bed CARE Score 5   Bed-Chair Transfer   Type of Assistance Needed Incidental touching;Supervision   Comment CS/CGA sit pivot txs   Chair/Bed-to-Chair Transfer CARE Score 4   Ambulation   Findings was not a focus of this session   Wheel 50 Feet with Two Turns   Type of Assistance Needed Supervision   Wheel 50 Feet with Two Turns CARE Score 4   Wheel 150 Feet   Type of Assistance Needed Supervision   Wheel 150 Feet CARE Score 4   Wheelchair mobility   Method Right upper extremity;Left upper extremity;Left lower extremity   Distance Level Surface (feet) 150 ft   Findings pt reviewed leg rest management and locking brakes from previous session.  leg rest tends to get stuck on prevalon boot   Curb or Single Stair   Reason if not Attempted Safety concerns   1 Step (Curb) CARE Score 88   4 Steps   Reason if not Attempted Safety concerns   4 Steps CARE Score 88   12 Steps   Reason if not Attempted Safety concerns   12 Steps CARE Score 88   Equipment Use   NuStep 10mins L2 B UEs, LLE SPM >55   Assessment   Treatment Assessment 30 min session focusing and general conditioning and endurance through use of nustep.  pt tolerated well with no complaints or pain. pt able to maintain WBS well during session with therapist CS/CGA as needed.  cont with POC " to maximize functional ind and decrease burden of care at time of d/c.   Problem List Decreased strength;Decreased range of motion;Decreased endurance;Impaired balance;Decreased mobility;Impaired hearing;Orthopedic restrictions;Decreased skin integrity;Decreased safety awareness   Barriers to Discharge Inaccessible home environment;Decreased caregiver support   PT Barriers   Functional Limitation Car transfers;Ramp negotiation;Stair negotiation;Standing;Transfers;Walking;Wheelchair management   Plan   Progress Progressing toward goals   Discharge Recommendation   Rehab Resource Intensity Level, PT II (Moderate Resource Intensity)   Equipment Recommended Wheelchair;Walker   PT Therapy Minutes   PT Time In 1230   PT Time Out 1300   PT Total Time (minutes) 30   PT Mode of treatment - Individual (minutes) 30   PT Mode of treatment - Concurrent (minutes) 0   PT Mode of treatment - Group (minutes) 0   PT Mode of treatment - Co-treat (minutes) 0   PT Mode of Treatment - Total time(minutes) 30 minutes   PT Cumulative Minutes 210   Therapy Time missed   Time missed? No

## 2024-03-07 NOTE — CASE MANAGEMENT
Clinical update faxed via [employer] to klaudia at [facility], [phone]. Informed of dc plan, awaiting determination.

## 2024-03-07 NOTE — PROGRESS NOTES
"   03/07/24 1000   Pain Assessment   Pain Assessment Tool 0-10   Pain Score No Pain   Restrictions/Precautions   Precautions Bed/chair alarms;Supervision on toilet/commode;Hard of hearing;Impulsive   RLE Weight Bearing Per Order NWB   Cognition   Memory Decreased recall of precautions;Decreased short term memory   Subjective   Subjective no complaints, reports being tired at end of session   Roll Left and Right   Type of Assistance Needed Set-up / clean-up   Roll Left and Right CARE Score 5   Sit to Lying   Type of Assistance Needed Set-up / clean-up   Sit to Lying CARE Score 5   Lying to Sitting on Side of Bed   Type of Assistance Needed Set-up / clean-up   Lying to Sitting on Side of Bed CARE Score 5   Sit to Stand   Type of Assistance Needed Incidental touching;Verbal cues;Adaptive equipment   Comment practiced repeated STS from w/c to RW, able to maintain NWB R LE. Pt using left hand to ush up with Right hand on RW   Sit to Stand CARE Score 4   Bed-Chair Transfer   Type of Assistance Needed Incidental touching;Verbal cues   Physical Assistance Level No physical assistance   Comment practiced repeated sit pivot transfers with various setup. Pt requiring minor cueing for setup and to \"SLOW DOWN\". Abl eto perform in both directions. Min A with RW for SPT, will benefit from ongoing practice.   Chair/Bed-to-Chair Transfer CARE Score 4   Car Transfer   Type of Assistance Needed Physical assistance;Verbal cues   Physical Assistance Level 26%-50%   Comment practiced sit pivot transfers w/c<> car. PT has tendency to rush and flop, however when transferring to higher surface she will actually slow down and clear her bottom very well..   Car Transfer CARE Score 3   Ambulation   Findings did not focus on walking this session   Wheel 50 Feet with Two Turns   Type of Assistance Needed Supervision   Wheel 50 Feet with Two Turns CARE Score 4   Wheel 150 Feet   Type of Assistance Needed Supervision   Wheel 150 Feet CARE Score 4 "   Wheelchair mobility   Does the patient use a wheelchair? 1. Yes   Type of Wheelchair Used 1. Manual   Method Right upper extremity;Left upper extremity;Left lower extremity   Distance Level Surface (feet) 150 ft   Distance Wheeled 3% Grade 30 ft  (ramp in short on ground floor)   Findings She woul dbenefit from a shoe, she has crocks her presently that fall off her foot. She will have her dtr bring in something else.   Therapeutic Interventions   Strengthening Repated STS 3x 5 reps to RW, STanding left hip flex and Abd 3 x 10   Other Reviewed all w/c parts, repeated practice of leg rest management, positioning for transfers and sequencing of barkes, armrest etc for safety. Requires cueing and can be distarcted fairly easy.   Assessment   Treatment Assessment Pt participating well. Progressing with functional transfers and maintainuing NWB RLE. Concern is when pt gets fatigued or distracted that her safety with transfers declines. She did well on ramp with w/c, only requiring CGA/Min A for 30 ft ramp. She will benefit from ongoing block transfer training to improve safety.   PT Therapy Minutes   PT Time In 1000   PT Time Out 1130   PT Total Time (minutes) 90   PT Mode of treatment - Individual (minutes) 90   PT Mode of treatment - Concurrent (minutes) 0   PT Mode of treatment - Group (minutes) 0   PT Mode of treatment - Co-treat (minutes) 0   PT Mode of Treatment - Total time(minutes) 90 minutes   PT Cumulative Minutes 180   Therapy Time missed   Time missed? No

## 2024-03-07 NOTE — PROGRESS NOTES
"   03/07/24 1330   Pain Assessment   Pain Assessment Tool 0-10   Pain Score No Pain   Patient's Stated Pain Goal No pain   Restrictions/Precautions   Precautions Bed/chair alarms;Impulsive;Supervision on toilet/commode;Fall Risk   Weight Bearing Restrictions Yes   RLE Weight Bearing Per Order NWB   ROM Restrictions No   Braces or Orthoses Other (Comment)  (prevalon boot right foot at all times)   Lifestyle   Autonomy \"I just set my foot down, I'm not stepping on it.\"   Putting On/Taking Off Footwear   Type of Assistance Needed Physical assistance   Physical Assistance Level 26%-50%   Comment left foot mod I seated at EOB, adjusted the prevalon boot with min A   Putting On/Taking Off Footwear CARE Score 3   Lying to Sitting on Side of Bed   Type of Assistance Needed Supervision   Physical Assistance Level No physical assistance   Comment HOB flat and no rail   Lying to Sitting on Side of Bed CARE Score 4   Sit to Stand   Type of Assistance Needed Verbal cues;Physical assistance   Physical Assistance Level 26%-50%   Comment Assist to recognize that she is bearing weight through RLE and correct. Pt is in denial that she is not compliant with WB restrictions   Sit to Stand CARE Score 3   Bed-Chair Transfer   Type of Assistance Needed Supervision   Physical Assistance Level No physical assistance   Comment toward left side only, static stand and pivot to right Pt unable to mntn NWB RLE   Chair/Bed-to-Chair Transfer CARE Score 4   Toileting Hygiene   Type of Assistance Needed Physical assistance   Physical Assistance Level 26%-50%   Comment assist for clothing mgmt due to Pt's inability to mntn NWB RLE, hygiene completed seated without assistance. task segemntation and strong encouragement needed to complete CM seated with hip hike, lateral weight shifting. At proximal thigh, Pt stood up to complete hiking to waist and would not raise right foot.   Toileting Hygiene CARE Score 3   Toilet Transfer   Type of Assistance " Needed Physical assistance   Physical Assistance Level 26%-50%   Comment CGA toward left to get onto drop-arm commode, Mod A to attemtp to mntn NWB in transferring off commode toward right.   Toilet Transfer CARE Score 3   Meal Prep   Meal Prep Level Wheelchair   Meal Prep Level of Assistance Moderate assistance   Meal Preparation Pt participated in completion of cooking an egg on the stove from wc level. She is now stating that her dtr, Arlene, does most of the cooking while the Pt supervises her. Pt was very impulsive with movements, contacting R foot frequently with cabinets, side of frig, stove front, etc. She was able to retrieve items from within the frig on upper levels (would be the lower levels of her frig due to freezer below). She becomes distracted easily during tasks, resting her wrist on the heating frying pan while telling a story to OT, without injury but OT had to withdraw her hand due to Pt's unawareness of the risk. Her handling of the frying pan to remove the eggs was also at high risk for burns due to poor pre-thought for where to place it, how to position herself to reach the plate. Recommend assist with meal prep upon return to home.   Kitchen Mobility   Kitchen-Mobility Level Wheelchair   Kitchen Activity Retrieve items;Transport items   Kitchen Mobility Comments Pt with poor awareness of right foot, increased risk for injury.   Cognition   Overall Cognitive Status Impaired   Arousal/Participation Alert;Cooperative   Attention Attends with cues to redirect   Orientation Level Oriented X4   Memory Decreased recall of precautions;Decreased short term memory   Following Commands Follows one step commands with increased time or repetition   Comments Pt is impulsive, with reduced insight into her medical and functional limitations or the consequences of not adhering to her WB restriction.   Activity Tolerance   Activity Tolerance Patient tolerated treatment well   Assessment   Treatment Assessment  Pt particiapted in 60 min OT session with emphasis on kitchen mobility and cooking tasks. Pt demonstrated impaired safety awareness, ability to self modify tasks to wc level, inability to preplan through tasks to reduce risks and energy expenditure. Pt now stating that her dtr, Arlene, collects all the cooking materials and does the majority of the actual cooking on the stove, with cues from Pt. Recommend Pt has assistance with meal prep upon discharge. Pt also particiapted in LB dressing and toileting tasks with emphasis on maintaining NWB during transfers and clothing mgmt. Pt does not recogniize the extent of her weight bearing, and then denies that there are serious risks and consequences to noncompliance. Pt continues to benefit from skilled OT intervention to provide repetition and trianing in compensatory strategies, task modifications, and AE use for maximum safe independence possible for return to home.   Prognosis Fair   Problem List Decreased strength;Decreased endurance;Impaired balance;Decreased mobility;Decreased cognition;Impaired judgement;Decreased safety awareness;Orthopedic restrictions   Barriers to Discharge Inaccessible home environment;Decreased caregiver support   Plan   Treatment/Interventions ADL retraining;Functional transfer training;Therapeutic exercise;Endurance training;Compensatory technique education   OT Therapy Minutes   OT Time In 1330   OT Time Out 1430   OT Total Time (minutes) 60   OT Mode of treatment - Individual (minutes) 60   OT Mode of treatment - Concurrent (minutes) 0   OT Mode of treatment - Group (minutes) 0   OT Mode of treatment - Co-treat (minutes) 0   OT Mode of Treatment - Total time(minutes) 60 minutes   OT Cumulative Minutes 150   Therapy Time missed   Time missed? No

## 2024-03-08 ENCOUNTER — HOME HEALTH ADMISSION (OUTPATIENT)
Dept: HOME HEALTH SERVICES | Facility: HOME HEALTHCARE | Age: 79
End: 2024-03-08
Payer: COMMERCIAL

## 2024-03-08 LAB
GLUCOSE SERPL-MCNC: 105 MG/DL (ref 65–140)
GLUCOSE SERPL-MCNC: 110 MG/DL (ref 65–140)
GLUCOSE SERPL-MCNC: 127 MG/DL (ref 65–140)
GLUCOSE SERPL-MCNC: 144 MG/DL (ref 65–140)

## 2024-03-08 PROCEDURE — 99232 SBSQ HOSP IP/OBS MODERATE 35: CPT | Performed by: INTERNAL MEDICINE

## 2024-03-08 PROCEDURE — 99232 SBSQ HOSP IP/OBS MODERATE 35: CPT | Performed by: STUDENT IN AN ORGANIZED HEALTH CARE EDUCATION/TRAINING PROGRAM

## 2024-03-08 PROCEDURE — 97530 THERAPEUTIC ACTIVITIES: CPT

## 2024-03-08 PROCEDURE — 97110 THERAPEUTIC EXERCISES: CPT

## 2024-03-08 PROCEDURE — 97542 WHEELCHAIR MNGMENT TRAINING: CPT

## 2024-03-08 PROCEDURE — 82948 REAGENT STRIP/BLOOD GLUCOSE: CPT

## 2024-03-08 RX ADMIN — PREGABALIN 200 MG: 100 CAPSULE ORAL at 21:17

## 2024-03-08 RX ADMIN — RIVAROXABAN 2.5 MG: 2.5 TABLET, FILM COATED ORAL at 17:53

## 2024-03-08 RX ADMIN — ACETAMINOPHEN 975 MG: 325 TABLET, FILM COATED ORAL at 21:17

## 2024-03-08 RX ADMIN — CLONAZEPAM 1 MG: 1 TABLET ORAL at 17:54

## 2024-03-08 RX ADMIN — METHOCARBAMOL 250 MG: 500 TABLET ORAL at 05:23

## 2024-03-08 RX ADMIN — CEFPODOXIME PROXETIL 400 MG: 200 TABLET, FILM COATED ORAL at 15:52

## 2024-03-08 RX ADMIN — PREGABALIN 200 MG: 100 CAPSULE ORAL at 08:26

## 2024-03-08 RX ADMIN — POLYETHYLENE GLYCOL 3350 17 G: 17 POWDER, FOR SOLUTION ORAL at 08:24

## 2024-03-08 RX ADMIN — ACETAMINOPHEN 975 MG: 325 TABLET, FILM COATED ORAL at 14:33

## 2024-03-08 RX ADMIN — ACETAMINOPHEN 975 MG: 325 TABLET, FILM COATED ORAL at 05:23

## 2024-03-08 RX ADMIN — SENNOSIDES, DOCUSATE SODIUM 1 TABLET: 8.6; 5 TABLET ORAL at 08:25

## 2024-03-08 RX ADMIN — PREGABALIN 200 MG: 100 CAPSULE ORAL at 15:52

## 2024-03-08 RX ADMIN — OMEGA-3 FATTY ACIDS CAP 1000 MG 1000 MG: 1000 CAP at 08:28

## 2024-03-08 RX ADMIN — PANTOPRAZOLE SODIUM 40 MG: 40 TABLET, DELAYED RELEASE ORAL at 05:23

## 2024-03-08 RX ADMIN — Medication 1 TABLET: at 08:25

## 2024-03-08 RX ADMIN — ASPIRIN 81 MG CHEWABLE TABLET 81 MG: 81 TABLET CHEWABLE at 08:26

## 2024-03-08 RX ADMIN — METFORMIN HYDROCHLORIDE 500 MG: 500 TABLET, FILM COATED ORAL at 08:26

## 2024-03-08 RX ADMIN — NICOTINE 1 PATCH: 14 PATCH, EXTENDED RELEASE TRANSDERMAL at 08:30

## 2024-03-08 RX ADMIN — CYANOCOBALAMIN TAB 500 MCG 1000 MCG: 500 TAB at 08:26

## 2024-03-08 RX ADMIN — METFORMIN HYDROCHLORIDE 500 MG: 500 TABLET, FILM COATED ORAL at 15:53

## 2024-03-08 RX ADMIN — RIVAROXABAN 2.5 MG: 2.5 TABLET, FILM COATED ORAL at 08:31

## 2024-03-08 RX ADMIN — FLUTICASONE FUROATE AND VILANTEROL TRIFENATATE 1 PUFF: 200; 25 POWDER RESPIRATORY (INHALATION) at 08:30

## 2024-03-08 RX ADMIN — METHOCARBAMOL 250 MG: 500 TABLET ORAL at 14:32

## 2024-03-08 RX ADMIN — BUPROPION HYDROCHLORIDE 300 MG: 150 TABLET, FILM COATED, EXTENDED RELEASE ORAL at 08:29

## 2024-03-08 RX ADMIN — CEFPODOXIME PROXETIL 400 MG: 200 TABLET, FILM COATED ORAL at 08:30

## 2024-03-08 RX ADMIN — METHOCARBAMOL 250 MG: 500 TABLET ORAL at 21:17

## 2024-03-08 NOTE — PLAN OF CARE
Problem: PAIN - ADULT  Goal: Verbalizes/displays adequate comfort level or baseline comfort level  Description: Interventions:  - Encourage patient to monitor pain and request assistance  - Assess pain using appropriate pain scale  - Administer analgesics based on type and severity of pain and evaluate response  - Implement non-pharmacological measures as appropriate and evaluate response  - Consider cultural and social influences on pain and pain management  - Notify physician/advanced practitioner if interventions unsuccessful or patient reports new pain  Outcome: Progressing     Problem: SAFETY ADULT  Goal: Patient will remain free of falls  Description: INTERVENTIONS:  - Educate patient/family on patient safety including physical limitations  - Instruct patient to call for assistance with activity   - Consult OT/PT to assist with strengthening/mobility   - Keep Call bell within reach  - Keep bed low and locked with side rails adjusted as appropriate  - Keep care items and personal belongings within reach  - Initiate and maintain comfort rounds  - Make Fall Risk Sign visible to staff  - Offer Toileting every 2 Hours, in advance of need  - Initiate/Maintain bed/chair alarm  - Obtain necessary fall risk management equipment: non skid footwear  - Apply yellow socks and bracelet for high fall risk patients  - Consider moving patient to room near nurses station  Outcome: Progressing

## 2024-03-08 NOTE — PROGRESS NOTES
03/08/24 0930   Pain Assessment   Pain Assessment Tool 0-10   Pain Score No Pain   Restrictions/Precautions   Precautions Bed/chair alarms;Cognitive;Combative;Fall Risk;Impulsive;Supervision on toilet/commode   Weight Bearing Restrictions Yes   RLE Weight Bearing Per Order NWB   ROM Restrictions No   Braces or Orthoses Other (Comment)  (R prevalon boot)   Cognition   Overall Cognitive Status Impaired   Arousal/Participation Alert;Cooperative   Attention Attends with cues to redirect   Orientation Level Oriented X4   Memory Decreased recall of precautions;Decreased short term memory   Following Commands Follows one step commands with increased time or repetition   Sit to Stand   Type of Assistance Needed Physical assistance;Incidental touching;Adaptive equipment   Comment Morris/CGA with RW, Max VCs to maintain NWB RLE   Sit to Stand CARE Score -   Bed-Chair Transfer   Type of Assistance Needed Incidental touching;Adaptive equipment   Comment CGA/CS with sit pivot transfers   Chair/Bed-to-Chair Transfer CARE Score 4   Transfer Bed/Chair/Wheelchair   Findings Focused on patient setting up transfers, removing/replacing leg rest, and managing arm rest. Does require VCs at times for safety with setting up transfers.   Wheel 50 Feet with Two Turns   Type of Assistance Needed Supervision   Wheel 50 Feet with Two Turns CARE Score 4   Wheel 150 Feet   Type of Assistance Needed Supervision   Wheel 150 Feet CARE Score 4   Wheelchair mobility   Does the patient use a wheelchair? 1. Yes   Type of Wheelchair Used 1. Manual   Method Right upper extremity;Left upper extremity   Assistance Provided For Remove Leg Rest;Replace Leg Rest;Remove armrests;Replace armrests;Locking Brakes;Obstacles   Distance Level Surface (feet) 58 ft  (52 ft)   Therapeutic Interventions   Strengthening supine SAQ 0# AW RLE 3x10, 3# AW on LLE 3x10, SLR 3x10 each LE, modified bridge 3x10   Assessment   Treatment Assessment Patient participated in brief 30  minute skilled physical thearpy session focusing on supine therex, WC mobility, and transfers. Patient states that she does not like using RW for SPT transfers. Patient's family coming in tomorrow to practice car transfer training. Patient will benefit from continued skilled physical therapy services to maximize safety and independence with functional mobility.   Problem List Decreased strength;Decreased endurance;Impaired balance;Decreased mobility;Decreased cognition;Impaired judgement;Decreased safety awareness;Decreased skin integrity;Orthopedic restrictions;Pain   Barriers to Discharge Inaccessible home environment;Decreased caregiver support   PT Barriers   Functional Limitation Car transfers;Standing;Transfers;Walking;Stair negotiation;Wheelchair management   Plan   Treatment/Interventions Functional transfer training;LE strengthening/ROM;Therapeutic exercise;Endurance training;Patient/family training;Equipment eval/education;Bed mobility;Gait training;Compensatory technique education   Progress Progressing toward goals   Discharge Recommendation   Rehab Resource Intensity Level, PT   (HH PT)   Equipment Recommended Wheelchair;Walker   PT Therapy Minutes   PT Time In 0930   PT Time Out 1000   PT Total Time (minutes) 30   PT Mode of treatment - Individual (minutes) 30   PT Mode of treatment - Concurrent (minutes) 0   PT Mode of treatment - Group (minutes) 0   PT Mode of treatment - Co-treat (minutes) 0   PT Mode of Treatment - Total time(minutes) 30 minutes   PT Cumulative Minutes 270     Elenita Miller, PT, DPT

## 2024-03-08 NOTE — PROGRESS NOTES
03/08/24 1230   Pain Assessment   Pain Assessment Tool 0-10   Pain Score No Pain   Restrictions/Precautions   Precautions Bed/chair alarms;Cognitive;Fall Risk;Impulsive;Supervision on toilet/commode   RLE Weight Bearing Per Order NWB   ROM Restrictions No   Braces or Orthoses Other (Comment)  (prevalon boot right foot at all times)   Cognition   Overall Cognitive Status Impaired   Arousal/Participation Alert;Cooperative   Attention Attends with cues to redirect   Orientation Level Oriented X4   Memory Decreased recall of precautions;Decreased short term memory   Following Commands Follows one step commands with increased time or repetition   Sit to Lying   Type of Assistance Needed Supervision   Sit to Lying CARE Score 4   Lying to Sitting on Side of Bed   Type of Assistance Needed Supervision   Lying to Sitting on Side of Bed CARE Score 4   Sit to Stand   Type of Assistance Needed Incidental touching;Verbal cues;Adaptive equipment;Physical assistance   Physical Assistance Level 25% or less   Comment MIN/CGA with/without RW, VC's at times to maitnain NWB to R foot. With RW L hand to chair, R hand on RW   Sit to Stand CARE Score 3   Bed-Chair Transfer   Type of Assistance Needed Physical assistance;Incidental touching;Verbal cues;Adaptive equipment   Physical Assistance Level 25% or less   Comment CG/CS with sit pivots, ANGELICA with SPT with use of RW. Pt very unsteady and moving impulsively. repeated SPT with RW and with VC's pt was better at keeping RW close to her.   Chair/Bed-to-Chair Transfer CARE Score 3   Walk 10 Feet   Reason if not Attempted Safety concerns   Walk 10 Feet CARE Score 88   Walk 50 Feet with Two Turns   Reason if not Attempted Safety concerns   Walk 50 Feet with Two Turns CARE Score 88   Walking 10 Feet on Uneven Surfaces   Reason if not Attempted Safety concerns   Walking 10 Feet on Uneven Surfaces CARE Score 88   Ambulation   Primary Mode of Locomotion Prior to Admission Walk   Does the  patient walk? 2. Yes   Wheel 50 Feet with Two Turns   Type of Assistance Needed Supervision   Wheel 50 Feet with Two Turns CARE Score 4   Wheel 150 Feet   Type of Assistance Needed Supervision   Wheel 150 Feet CARE Score 4   Wheelchair mobility   Does the patient use a wheelchair? 1. Yes   Type of Wheelchair Used 1. Manual   Method Right upper extremity;Left upper extremity;Left lower extremity   Assistance Provided For Remove Leg Rest;Replace Leg Rest;Remove armrests;Replace armrests;Locking Brakes   Distance Level Surface (feet) 200 ft  (120')   Findings declined going outside to practice ramp   Curb or Single Stair   Reason if not Attempted Safety concerns   1 Step (Curb) CARE Score 88   4 Steps   Reason if not Attempted Safety concerns   4 Steps CARE Score 88   12 Steps   Reason if not Attempted Safety concerns   12 Steps CARE Score 88   Picking Up Object   Reason if not Attempted Safety concerns   Picking Up Object CARE Score 88   Therapeutic Interventions   Strengthening seated hip flex to RLE with 3# added to thigh   Balance repeated SPT with RW and sit pivot transfers no AD. SPT with RW is last resort transfer for pt and will be used only as needed.   Other repeated practice of donning/doffing R leg rest   Equipment Use   NuStep x10 min BUE and LLE L2   Assessment   Treatment Assessment Pt participated in skilled PT session with increased focus on functional transfers, WC mobility and WC mechanics. Pt very impulsive with SPT with use of RW. RW was moving away from her when standing and pt made no effort to pull it closer to her and correct positioning. Pt did much better this session with sit pivots and was able to maintain NWB to R foot. Pt will cont POC as tolerated with cont focus on functional transfers and safety. Pt anticipates performing car transfer to personal car tomorrow 3/9/24.   Problem List Decreased strength;Decreased endurance;Impaired balance;Decreased mobility;Decreased cognition;Impaired  judgement;Decreased safety awareness;Orthopedic restrictions   Barriers to Discharge Inaccessible home environment;Decreased caregiver support   PT Barriers   Functional Limitation Car transfers;Standing;Transfers;Walking;Stair negotiation;Wheelchair management   Plan   Treatment/Interventions Functional transfer training;LE strengthening/ROM;Endurance training;Cognitive reorientation;Patient/family training;Bed mobility   Progress Progressing toward goals   Discharge Recommendation   Equipment Recommended Walker;Wheelchair   PT Therapy Minutes   PT Time In 1230   PT Time Out 1330   PT Total Time (minutes) 60   PT Mode of treatment - Individual (minutes) 60   PT Mode of treatment - Concurrent (minutes) 0   PT Mode of treatment - Group (minutes) 0   PT Mode of treatment - Co-treat (minutes) 0   PT Mode of Treatment - Total time(minutes) 60 minutes   PT Cumulative Minutes 300   Therapy Time missed   Time missed? No

## 2024-03-08 NOTE — PROGRESS NOTES
[facility]  Progress Note  Name: oLna Cedeno I  MRN: [id number]  Unit/Bed#: -01 I Date of Admission: 3/5/2024   Date of Service: 3/8/2024 I Hospital Day: 3    Assessment/Plan   * Surgical site infection  Assessment & Plan  S/p right axilla/femoral bypass 1/31/2024  Postop infection s/p washout/debridement 2/14 and 2/19  VAC removed 2/28/2024  Continue oral antibiotics through 3/22/2024  Cultures positive Klebsiella/Proteus  Continue local dressing changes  Will consult vascular surgery if needed for local care  Refer to media    Diabetic foot ulcer with osteomyelitis (HCC)  Assessment & Plan  Lab Results   Component Value Date    HGBA1C 6.0 (H) 02/28/2024       Recent Labs     03/07/24  1555 03/07/24  2143 03/08/24  0650 03/08/24  1142   POCGLU 104 157* 127 105         Blood Sugar Average: Last 72 hrs:  (P) 130.4518172056727506  S/p medial ulcer debridement/partial second toe amputation 2/4/2024  Recent STSG on 2/28/2024 with VAC removal 3/4/2024  Continue local care  NWB  podiatry following for management  PMR managing therapy/pain      PAD (peripheral artery disease) ([facility])  Assessment & Plan  S/p right axillo-femoral bypass 1/31/2024  Continue aspirin/statin/low-dose Xarelto    Essential hypertension  Assessment & Plan  Hydralazine as needed.  Was taking Lisinopril 5mg daily and amiloride 5mg daily prior to admission.  stable    Type 2 diabetes mellitus with diabetic polyneuropathy (HCC)  Assessment & Plan  Lab Results   Component Value Date    HGBA1C 6.0 (H) 02/28/2024       Recent Labs     03/07/24  1555 03/07/24  2143 03/08/24  0650 03/08/24  1142   POCGLU 104 157* 127 105         Blood Sugar Average: Last 72 hrs:  (P) 130.3354912783316605  Home: Takes metformin 1000 mg twice daily/Januvia 100 mg at lunchtime  Here: Sliding scale/DM diet/metformin 500mg bid  Continue metformin as above and move towards getting back to oral meds  Blood sugar stable                  The above assessment and plan was reviewed and updated as determined by my evaluation of the patient on 3/8/2024.    Labs:   Results from last 7 days   Lab Units 03/04/24  0540 03/03/24  0631   WBC Thousand/uL 9.33 8.83   HEMOGLOBIN g/dL 11.6 11.1*   HEMATOCRIT % 37.9 35.4   PLATELETS Thousands/uL 280 277     Results from last 7 days   Lab Units 03/04/24  0540 03/03/24  0631   SODIUM mmol/L 137 140   POTASSIUM mmol/L 4.1 4.2   CHLORIDE mmol/L 101 103   CO2 mmol/L 30 31   BUN mg/dL 21 17   CREATININE mg/dL 0.47* 0.48*   CALCIUM mg/dL 9.3 9.5             Results from last 7 days   Lab Units 03/08/24  1142 03/08/24  0650 03/07/24  2143   POC GLUCOSE mg/dl 105 127 157*       Imaging  No orders to display       Review of Scheduled Meds:  Current Facility-Administered Medications   Medication Dose Route Frequency Provider Last Rate    acetaminophen  975 mg Oral Q8H JILLIAN Vanesa Sheehan, CRNP      albuterol  2 puff Inhalation Q6H PRN Vanesa Sheehan, CRNP      aspirin  81 mg Oral Daily Vanesa Sheehan, CRNP      buPROPion  300 mg Oral Daily Vanesa Sheehan, CRNP      cefpodoxime  400 mg Oral BID With Meals Vanesa Sheehan, CRNP      clonazePAM  1 mg Oral QPM Vanesa Sheehan, CRNP      cyanocobalamin  1,000 mcg Oral Daily Vanesa Sheehan, CRNP      fish oil  1,000 mg Oral Daily Vanesa Sheehan, CRNP      fluticasone-vilanterol  1 puff Inhalation Daily Vanesa Sheehan, CRNP      hydrALAZINE  25 mg Oral Q8H PRN Vanesa Sheehan, CRNP      insulin lispro  1-5 Units Subcutaneous TID AC Vanesa Sheehan, CRNP      insulin lispro  1-5 Units Subcutaneous HS Vanesa Sheehan, CRNP      metFORMIN  500 mg Oral BID With Meals Vanesa Sheehan, CRNP      methocarbamol  250 mg Oral Q8H JILLIAN Vanesa Sheehan, CRNP      multivitamin-minerals  1 tablet Oral Daily Vanesa Sheehan, CRNP      nicotine  1 patch Transdermal Daily Vanesa Sheehan, CRNP      oxyCODONE  2.5 mg Oral Q4H PRN Vanesa Sheehan, CRNP      Or    oxyCODONE  5 mg Oral Q4H PRN Vanesa Sheehan, CRNP      pantoprazole  40 mg  Oral Early Morning Vanesa Sheehan, CRNP      polyethylene glycol  17 g Oral Daily Vanesa Sheehan, CRNP      pregabalin  200 mg Oral TID Vanesa Sheehan, CRNP      rivaroxaban  2.5 mg Oral BID Vanesa Sheehan, CRNP      senna-docusate sodium  1 tablet Oral BID Vanesa Sheehan, CRNP         Subjective/ HPI: Patient seen and examined. Patients overnight issues or events were reviewed with nursing staff. New or overnight issues include the following:     Patient seen at bedside, denies any subjective complaints    ROS:   A 10 point ROS was performed; negative except as noted above.        *Labs /Radiology studies Reviewed, no new imaging  *Medications  reviewed and reconciled as needed  *Please refer to order section for additional ordered labs studies      Physical Examination:  Vitals:   Vitals:    03/07/24 0533 03/07/24 1448 03/07/24 2050 03/08/24 0511   BP: 125/80 132/68 130/60 125/66   BP Location: Left arm Left arm Left arm Left arm   Pulse: 74 86 74 75   Resp: 18 17 18 18   Temp: 97.8 °F (36.6 °C) 98.3 °F (36.8 °C) 97.6 °F (36.4 °C) 97.7 °F (36.5 °C)   TempSrc: Oral Oral Oral Oral   SpO2: 93% 96% 93% 94%   Weight:           General Appearance: NAD; pleasant  HEENT: PERRLA, conjuctiva normal; mucous membranes moist; face symmetrical  Neck:  Supple  Lungs: clear bilaterally, normal respiratory effort, no retractions, expiratory effort normal, on room air  CV: regular rate and rhythm, no murmurs rubs or gallops noted   ABD: soft non tender, +BS x4  EXT: Right foot on boot.  Skin: normal turgor, normal texture, no rash  Psych: affect normal, mood normal  Neuro: AAOx3       The above physical exam was reviewed and updated as determined by my evaluation of the patient on 3/8/2024.    Invasive Devices       Peripheral Intravenous Line  Duration             Peripheral IV 03/05/24 Dorsal (posterior);Left Wrist 3 days              Drain  Duration             Ureteral Internal Stent Left ureter 6 Fr. 109 days                       VTE  Pharmacologic Prophylaxis: Xarelto  Code Status: Level 3 - DNAR and DNI  Current Length of Stay: 3 day(s)    Total floor / unit time spent today 30 minutes  Coordination of patient's care was performed in conjunction with primary service. Time invested included review of patient's labs, vitals, and management of their comorbidities with continued monitoring, examination of patient as well as answering patient questions, documenting her findings and creating progress note in electronic medical record,  ordering appropriate diagnostic testing.       ** Please Note:  voice to text software may have been used in the creation of this document. Although proof errors in transcription or interpretation are a potential of such software**

## 2024-03-08 NOTE — PROGRESS NOTES
"   03/08/24 1030   Pain Assessment   Pain Assessment Tool 0-10   Pain Score No Pain   Restrictions/Precautions   Precautions Bed/chair alarms;Cognitive;Combative;Fall Risk;Impulsive;Supervision on toilet/commode   Weight Bearing Restrictions Yes   RLE Weight Bearing Per Order NWB   ROM Restrictions No   Braces or Orthoses Other (Comment)  (prevalon boot right foot at all times)   Lifestyle   Autonomy \"I'll have to pull out my double box for these pills\" referring to 2x a day pill organizer   Eating   Type of Assistance Needed Independent   Physical Assistance Level No physical assistance   Eating CARE Score 6   Bed-Chair Transfer   Type of Assistance Needed Verbal cues;Physical assistance   Physical Assistance Level 25% or less   Comment Min A for sit pivots to R and L side; pt does adhere to RLE NWBing during transfers with VCs. of note, pt noted with heavy reliance on bed rail initially however pt reports she will not have one at time of d/c and so when completing transfer w/o bedrail assist, pt does require slightly increased assistance for sit pivots. able to remove leg rest however does WB thru RLE when doing so requiring VCs for maintaining precautions; able to set self up in w/c appropriately and safely in prep for transfer.   Chair/Bed-to-Chair Transfer CARE Score 3   Health Management   Health Management (S)  pt engages in med management activity using current medication list. pt able to recall meds she was taking prior to admission, frequency/duration. pt unable to recall new medications (since admission) and what they are for without assistance. pt reports PTA she was using a 1x a day pil organizer; keeps wellbutrin and klonopin (takes before bed) on nightstand and metformin on her table (takes with meals). all meds except for the ones above are just daily, however, the newer meds (since admission) are multiple times a day. throughout task, pt did not mention that when she is required to take newer " "medications that are multiple times a day that she \"pulls out her double box\" meaning her 2x a day pill organizer until then end when trying to problem solve how she would go about recalling when to take multiple pills multiple times a day. please plan to re-assess medication management with 1x a day organizer for daily and morning pills and then 2x a day organizer for meds that are multiple times a day.   Cognition   Overall Cognitive Status Impaired   Arousal/Participation Alert;Cooperative   Attention Attends with cues to redirect   Orientation Level Oriented X4   Memory Decreased recall of precautions;Decreased short term memory   Following Commands Follows one step commands with increased time or repetition   Activity Tolerance   Activity Tolerance Patient tolerated treatment well   Assessment   Treatment Assessment pt engages in 60 minute skilled OT Session focusing on repetitive sit pivot transfers, med management with use of pill organizer. see above for full func details. pt continues to demo semi impulsive behaviors requiring VCs for safety. fair carryover of RLE WBing restrictions, however does not maintain when performing w/c management especially donning/doffing leg rests. overall CG/min A for sit pivots to R and L side with VCs for RLE NWBing. completed med management task--see above for details. please re-assess in upcoming sessions with use of 1x a day and 2x a day pill organizers prior to d/c to ensure full IND. pt remains highly limited by WBing status, decreased func cog/safety, warranting continued skilled care to focus on ADL retraining, sit pivots, IADLs from w/c level, med management, in order to decrease burden of care at d/c.   Prognosis Fair   Problem List Decreased strength;Decreased endurance;Impaired balance;Decreased mobility;Decreased cognition;Impaired judgement;Decreased safety awareness;Orthopedic restrictions   Plan   Treatment/Interventions ADL retraining;Functional transfer " training;Therapeutic exercise;Endurance training;Cognitive reorientation;Patient/family training;Equipment eval/education;Compensatory technique education   OT Therapy Minutes   OT Time In 1030   OT Time Out 1130   OT Total Time (minutes) 60   OT Mode of treatment - Individual (minutes) 60   OT Mode of treatment - Concurrent (minutes) 0   OT Mode of treatment - Group (minutes) 0   OT Mode of treatment - Co-treat (minutes) 0   OT Mode of Treatment - Total time(minutes) 60 minutes   OT Cumulative Minutes 210   Therapy Time missed   Time missed? No

## 2024-03-08 NOTE — PROGRESS NOTES
03/08/24 0830   Pain Assessment   Pain Assessment Tool 0-10   Pain Score No Pain   Restrictions/Precautions   Precautions Bed/chair alarms;Cognitive;Combative;Fall Risk;Impulsive;Supervision on toilet/commode   Weight Bearing Restrictions Yes   RLE Weight Bearing Per Order NWB   ROM Restrictions No   Braces or Orthoses Other (Comment)  (R prevalon boot)   Cognition   Overall Cognitive Status Impaired   Arousal/Participation Alert;Cooperative   Attention Attends with cues to redirect   Orientation Level Oriented X4   Memory Decreased recall of precautions;Decreased short term memory   Following Commands Follows one step commands with increased time or repetition   Roll Left and Right   Type of Assistance Needed Set-up / clean-up   Roll Left and Right CARE Score 5   Sit to Lying   Type of Assistance Needed Supervision   Comment HOB flat, no use of bed rails. also performed on exercise mat   Sit to Lying CARE Score 4   Lying to Sitting on Side of Bed   Type of Assistance Needed Supervision   Comment HOB flat, no use of bed rails. also performed on exercise mat   Lying to Sitting on Side of Bed CARE Score 4   Sit to Stand   Type of Assistance Needed Physical assistance;Adaptive equipment;Verbal cues   Physical Assistance Level 25% or less   Comment Morris/CGA with RW, max VCs to maintain NWB on RLE, pt placed RLE foot on the ground at times.   Sit to Stand CARE Score 3   Bed-Chair Transfer   Type of Assistance Needed Physical assistance   Physical Assistance Level 25% or less   Comment mainly CGA for sit pivot transfer but did require MinAx1 for 1 trial. VCs to maintain NWB on RLE. Trialed SPT with RW WC<>EOM, pt required Morris. Pt demonstrated difficulty with sequencing of SPT transfer, difficulty hopping on LLE, and max VCs to maintain NWB on RLE.   Chair/Bed-to-Chair Transfer CARE Score 3   Car Transfer   Type of Assistance Needed Physical assistance   Physical Assistance Level 25% or less   Comment Mimiced car  transfer from WC to elevated mat surface, Morris/CGA for sit pivot transfers   Car Transfer CARE Score 3   Wheel 50 Feet with Two Turns   Type of Assistance Needed Supervision   Wheel 50 Feet with Two Turns CARE Score 4   Wheel 150 Feet   Type of Assistance Needed Supervision   Wheel 150 Feet CARE Score 4   Wheelchair mobility   Does the patient use a wheelchair? 1. Yes   Type of Wheelchair Used 1. Manual   Method Right upper extremity;Left upper extremity   Assistance Provided For Remove Leg Rest;Replace Leg Rest;Remove armrests;Replace armrests;Locking Brakes;Obstacles   Distance Level Surface (feet) 120 ft  (x2, 68 ft x2)   Findings Practice weaving around cones in the hallway x 60 ft   Assessment   Treatment Assessment Patient participated in brief 30 minute skilled physical therapys session focusing on transfers and WC mobility. Practiced both sit pivot and stand pivot transfers with RW. Recommending sit pivots for home, but will continue to practice SPT with RW as an alternative in the event sit pivot doesn't work in some home environments.   Problem List Decreased strength;Decreased endurance;Impaired balance;Decreased mobility;Decreased cognition;Impaired judgement;Decreased safety awareness;Decreased skin integrity;Orthopedic restrictions;Pain   Barriers to Discharge Inaccessible home environment;Decreased caregiver support   PT Barriers   Functional Limitation Car transfers;Standing;Transfers;Walking;Stair negotiation;Wheelchair management   Plan   Treatment/Interventions Functional transfer training;LE strengthening/ROM;Therapeutic exercise;Endurance training;Patient/family training;Equipment eval/education;Bed mobility;Gait training;Compensatory technique education   Progress Progressing toward goals   Discharge Recommendation   Rehab Resource Intensity Level, PT   (HH PT)   Equipment Recommended Wheelchair;Walker   PT Therapy Minutes   PT Time In 0830   PT Time Out 0900   PT Total Time (minutes) 30   PT  Mode of treatment - Individual (minutes) 30   PT Mode of treatment - Concurrent (minutes) 0   PT Mode of treatment - Group (minutes) 0   PT Mode of treatment - Co-treat (minutes) 0   PT Mode of Treatment - Total time(minutes) 30 minutes   PT Cumulative Minutes 240     Elenita Miller, PT, DPT

## 2024-03-08 NOTE — CASE MANAGEMENT
Referral made to [facility] for rn pt and ot services on dc.     [id number] received email from klaudia at [facility] approving stay with next update 3/14 or notification of dc.

## 2024-03-09 LAB
GLUCOSE SERPL-MCNC: 127 MG/DL (ref 65–140)
GLUCOSE SERPL-MCNC: 140 MG/DL (ref 65–140)
GLUCOSE SERPL-MCNC: 223 MG/DL (ref 65–140)
GLUCOSE SERPL-MCNC: 67 MG/DL (ref 65–140)

## 2024-03-09 PROCEDURE — 82948 REAGENT STRIP/BLOOD GLUCOSE: CPT

## 2024-03-09 PROCEDURE — 97110 THERAPEUTIC EXERCISES: CPT

## 2024-03-09 PROCEDURE — 97535 SELF CARE MNGMENT TRAINING: CPT

## 2024-03-09 PROCEDURE — 99232 SBSQ HOSP IP/OBS MODERATE 35: CPT | Performed by: STUDENT IN AN ORGANIZED HEALTH CARE EDUCATION/TRAINING PROGRAM

## 2024-03-09 PROCEDURE — 99232 SBSQ HOSP IP/OBS MODERATE 35: CPT | Performed by: INTERNAL MEDICINE

## 2024-03-09 PROCEDURE — 97530 THERAPEUTIC ACTIVITIES: CPT

## 2024-03-09 RX ADMIN — ACETAMINOPHEN 975 MG: 325 TABLET, FILM COATED ORAL at 14:45

## 2024-03-09 RX ADMIN — NICOTINE 1 PATCH: 14 PATCH, EXTENDED RELEASE TRANSDERMAL at 09:02

## 2024-03-09 RX ADMIN — ACETAMINOPHEN 975 MG: 325 TABLET, FILM COATED ORAL at 21:35

## 2024-03-09 RX ADMIN — ASPIRIN 81 MG CHEWABLE TABLET 81 MG: 81 TABLET CHEWABLE at 09:02

## 2024-03-09 RX ADMIN — METFORMIN HYDROCHLORIDE 500 MG: 500 TABLET, FILM COATED ORAL at 09:01

## 2024-03-09 RX ADMIN — BUPROPION HYDROCHLORIDE 300 MG: 150 TABLET, FILM COATED, EXTENDED RELEASE ORAL at 09:04

## 2024-03-09 RX ADMIN — Medication 1 TABLET: at 09:01

## 2024-03-09 RX ADMIN — CLONAZEPAM 1 MG: 1 TABLET ORAL at 18:52

## 2024-03-09 RX ADMIN — CYANOCOBALAMIN TAB 500 MCG 1000 MCG: 500 TAB at 09:02

## 2024-03-09 RX ADMIN — Medication 2.5 MG: at 21:35

## 2024-03-09 RX ADMIN — METHOCARBAMOL 250 MG: 500 TABLET ORAL at 21:35

## 2024-03-09 RX ADMIN — PREGABALIN 200 MG: 100 CAPSULE ORAL at 21:36

## 2024-03-09 RX ADMIN — PREGABALIN 200 MG: 100 CAPSULE ORAL at 16:32

## 2024-03-09 RX ADMIN — METHOCARBAMOL 250 MG: 500 TABLET ORAL at 14:45

## 2024-03-09 RX ADMIN — PREGABALIN 200 MG: 100 CAPSULE ORAL at 09:01

## 2024-03-09 RX ADMIN — FLUTICASONE FUROATE AND VILANTEROL TRIFENATATE 1 PUFF: 200; 25 POWDER RESPIRATORY (INHALATION) at 09:04

## 2024-03-09 RX ADMIN — METFORMIN HYDROCHLORIDE 500 MG: 500 TABLET, FILM COATED ORAL at 16:32

## 2024-03-09 RX ADMIN — PANTOPRAZOLE SODIUM 40 MG: 40 TABLET, DELAYED RELEASE ORAL at 05:02

## 2024-03-09 RX ADMIN — SENNOSIDES, DOCUSATE SODIUM 1 TABLET: 8.6; 5 TABLET ORAL at 18:52

## 2024-03-09 RX ADMIN — OMEGA-3 FATTY ACIDS CAP 1000 MG 1000 MG: 1000 CAP at 09:04

## 2024-03-09 RX ADMIN — INSULIN LISPRO 2 UNITS: 100 INJECTION, SOLUTION INTRAVENOUS; SUBCUTANEOUS at 11:18

## 2024-03-09 RX ADMIN — SENNOSIDES, DOCUSATE SODIUM 1 TABLET: 8.6; 5 TABLET ORAL at 09:00

## 2024-03-09 RX ADMIN — RIVAROXABAN 2.5 MG: 2.5 TABLET, FILM COATED ORAL at 18:52

## 2024-03-09 RX ADMIN — RIVAROXABAN 2.5 MG: 2.5 TABLET, FILM COATED ORAL at 09:04

## 2024-03-09 RX ADMIN — METHOCARBAMOL 250 MG: 500 TABLET ORAL at 05:02

## 2024-03-09 RX ADMIN — CEFPODOXIME PROXETIL 400 MG: 200 TABLET, FILM COATED ORAL at 16:33

## 2024-03-09 RX ADMIN — CEFPODOXIME PROXETIL 400 MG: 200 TABLET, FILM COATED ORAL at 09:01

## 2024-03-09 RX ADMIN — ACETAMINOPHEN 975 MG: 325 TABLET, FILM COATED ORAL at 05:02

## 2024-03-09 NOTE — PROGRESS NOTES
PM&R PROGRESS NOTE:  Lona Cedeno 78 y.o. female MRN: [id number]  Unit/Bed#: -01 Encounter: [id number]    Rehab Diagnosis: Impairment of mobility, safety and Activities of Daily Living (ADLs) due to Other Disabling Impairments:  13  Other Disabling Impairments     Etiologic: Diabetic foot ulcer with osteomyelitis s/p fem bypass, right foot wound and achilles debridement with 2nd toe partial amputation and now s/p STSG   Date of Onset: 2/26/24     Date of surgery: 1/31, 2/4, 2/14, 2/28     History of Present Illness:   Lona Cedeno is a 78 y.o. female with history of anxiety, closed fracture of the coccyx in May 2023, diabetes, GERD, hyperlipidemia, hypertension, IBS who presented to the [facility] on 1/23 from the podiatry office with a diabetic ulcer of the right toe and right ankle with exposed Achilles tendon.  She was stabbed have osteomyelitis of the right foot transferred to [facility] to reconsider vascularization and surgical options.  She is recommended to undergo an extra-anatomic bypass.  There was discussion about right transtibial amputation however decision was made for attempted limb salvage.  She underwent the above bypass on 1/31/2024 as well as on 2/4/2024 and underwent right toe and heel wound debridement with cycle and partial amputation of the toe and VAC placement.  Her course was complicated by arrest chest wall hematoma s/p evacuation on 2/4 with vascular.  On 2/13 the patient needed discharge back to acute care in the setting of fevers and leukocytosis from a potential infectious standpoint.  She was also given a discharge from the [facility] to acute care setting after podiatry wanted to take the patient back for split thickness skin grafting which occurred on 2/28 with Dr. Yeboah.  Patient was transition to oral antibiotics and wound vacs have been removed.  She remains nonweightbearing to the right lower extremity. The patient was evaluated by  the Rehabilitation team and deemed an appropriate candidate for comprehensive inpatient rehabilitation and admitted to the [facility] on 3/5/2024  2:03 PM    SUBJECTIVE: Patient seen and evaluated in room.  No acute events overnight and participating with therapy.  Mood is good denies any fever, chills, nausea, vomiting, cough or shortness of breath, diarrhea constipation.      ASSESSMENT: Stable, progressing      PLAN:    Rehabilitation  Functional deficits: Self-care and mobility  Continue current rehabilitation plan of care to maximize function.    Functional update:   PT: Min to mod assist for transfers, supervision for bed mobility and able to hop 8 feet in parallel bars using moderate assist maintaining nonweightbearing  OT: Hygiene moderate assist, eating set up, bathing moderate assist, dressing moderate assist    Estimated Discharge: Estimated 3/14    DVT prophylaxis  On Xarelto     Pain  Acetaminophen 975 mg every 8 hours  Lyrica 200 mg 3 times daily  Robaxin 250 mg every 8 hours  Oxycodone 2.5-5 mg every 4 hours as needed     Bladder plan  Continent     Bowel plan  Continent  Last bowel movement on 3/8     Code Status  Level 3 DNAR/DNI      * Surgical site infection  Assessment & Plan  Needed treatment with cefepime for surgical site infection however also on vancomycin which was discontinued on 2/16  Right groin breakdown status post wound washout with VAC placement on 2/14 as well as 2/19  Was followed by infectious disease and continued on cefepime with plan to transition to cefpodoxime 400 mg twice daily for 4 weeks.  Cultures were positive for Klebsiella and Proteus  VAC to the right groin as well as the foot have since been discontinued and continues for cefpodoxime for antibiotic treatment  Maintain nonweightbearing to the right lower extremity  Underwent split thickness skin grafting with podiatry on 2/28 for the right foot  Due to the surgical site infection, sepsis and requirement for multiple trips to  the OR for wound debridement, VAC placement, split-thickness grafting patient is not at her functional baseline and would benefit from daily physician oversight and rehabilitation nursing as well as aggressive physical and occupational therapy under guidance of physical medicine and rehabilitation specialist.  Patient will tolerate this 3 hours/day 5 to 7 days/week with ultimate discharge goal for the community    Impulsive  Assessment & Plan  Had impulsive behaviors during prior admissions and discussed overall behavioral plan.  Patient on last admission was impulsive and not using call bell appropriately as well as not following weightbearing restrictions  Discussed with patient again however continued vigilance with nursing and therapy checks, alarms and relatively sensitive settings and would benefit from Q 2-hour bowel bladder checks    Sacral wound  Assessment & Plan  Unstageable wound on the sacrum on initial evaluations, POA  Consult wound care for management  Vigilant turns in bed and weight shifts, daily sacral checks by nursing    Acute pain due to injury  Assessment & Plan  Acetaminophen 975 mg every 8 hours  Lyrica 200 mg 3 times daily  Robaxin 250 mg every 8 hours  Oxycodone 2.5-5 mg every 4 hours as needed    Impaired mobility and activities of daily living  Assessment & Plan  - Rehabilitation medicine physician for daily monitoring of care, 24 hour availability for acute  medical issues, medication management, and therapeutic and diagnostic assessments.  - 24 hour rehabilitation nursing 7 days per week for: management/teaching of medications,  bowel/bladder routine, skin care.  - PT, OT for 2-3 hours per day, 5 to 6 days per week; 15 hours per week  - Rehabilitation Psychology for adjustment and coping  - MSW for barriers to discharge, community resources, and family support  - Discharge planning following to help ensure a safe and efficient discharge    Anemia  Assessment & Plan  Most recent  hemoglobin 11.6 which is just within the normal range but has been overall improving  Continue to follow biweekly labs or sooner if clinically indicated as patient is overall at risk for drop in hemoglobin  On review of the last 2 weeks of labs has improved slightly from the mid to upper 10s  Continue B12 supplementation    Diabetic foot ulcer with osteomyelitis ([facility])  Assessment & Plan  Patient presented with right posterior ankle ulceration with exposed Achilles tendon and required foot debridement on 2/4/2024 with VAC placement with right medial ankle ulcer, right second toe osteomyelitis status post right second toe amputation  Wound vacs have since been discontinued and continues to be followed by podiatry  Require going back to the OR on 2/28 for split-thickness grafting  Remains nonweightbearing to the right lower extremity      Tobacco use disorder  Assessment & Plan  Smoking cessation counseling  Had been on nicotine patch 21 mg / 24-hour, however decreased to 14 mg    COPD, severity to be determined ([facility])  Assessment & Plan  Continue inhalers per internal medicine including albuterol as needed as well as fluticasone-vilanterol 1 puff daily  Monitor oxygen saturations and therapy    PAD (peripheral artery disease) ([facility])  Assessment & Plan  Patient was on aspirin, statin and low-dose Xarelto 2.5 mg twice daily  Status post 1/31 bypass axillary-femoral, right with 8 mm ringed PTFE graft    GERD without esophagitis  Assessment & Plan  Continue Protonix    Anxiety and depression  Assessment & Plan  Continue Wellbutrin  mg daily as well as Klonopin  Supportive counseling and consider neuropsychology if indicated and patient agreeable    Essential hypertension  Assessment & Plan  Monitor pressures in therapy however has been off of regular scheduled antihypertensives  Ordered hydralazine as needed for systolic blood pressure greater than 160    Type 2 diabetes mellitus with diabetic polyneuropathy  ([facility])  Assessment & Plan  Lab Results   Component Value Date    HGBA1C 6.0 (H) 02/28/2024       Recent Labs     03/08/24  1556 03/08/24  2114 03/09/24  0628 03/09/24  1042   POCGLU 110 144* 140 223*     Has been on metformin as an outpatient but held while inpatient and currently on a sliding scale regimen.  Monitor per internal medicine and make adjustments as needed  Glycemic control has been good we will need to continue to for appropriate wound healing potential          Appreciate IM consultants medical co-management.  Labs, medications, and imaging personally reviewed.      ROS:  A ten point review of systems was completed on 03/09/24 and pertinent positives are listed in subjective section. All other systems reviewed were negative.       OBJECTIVE:   /57 (BP Location: Left arm)   Pulse 67   Temp 97.6 °F (36.4 °C) (Oral)   Resp 18   Wt 69.5 kg (153 lb 3.5 oz)   SpO2 95%   BMI 28.95 kg/m²     Physical Exam  Vitals reviewed.   Constitutional:       General: She is not in acute distress.  HENT:      Head: Normocephalic and atraumatic.      Right Ear: External ear normal.      Left Ear: External ear normal.      Nose: Nose normal. No rhinorrhea.      Mouth/Throat:      Mouth: Mucous membranes are moist.      Pharynx: Oropharynx is clear.   Eyes:      General: No scleral icterus.  Cardiovascular:      Rate and Rhythm: Normal rate.   Pulmonary:      Effort: Pulmonary effort is normal. No respiratory distress.   Abdominal:      General: There is no distension.      Palpations: Abdomen is soft.   Musculoskeletal:      Right lower leg: Edema present.      Left lower leg: No edema.      Comments: Ace wrap on the right lower extremity with Prevalon boot in place, minimal edema   Skin:     General: Skin is warm and dry.   Neurological:      Mental Status: She is alert and oriented to person, place, and time.      Motor: No weakness.   Psychiatric:         Mood and Affect: Mood normal.         Behavior:  Behavior normal.          Lab Results   Component Value Date    WBC 9.33 03/04/2024    HGB 11.6 03/04/2024    HCT 37.9 03/04/2024     (H) 03/04/2024     03/04/2024     Lab Results   Component Value Date    SODIUM 137 03/04/2024    K 4.1 03/04/2024     03/04/2024    CO2 30 03/04/2024    BUN 21 03/04/2024    CREATININE 0.47 (L) 03/04/2024    GLUC 161 (H) 03/04/2024    CALCIUM 9.3 03/04/2024     Lab Results   Component Value Date    INR 1.16 02/14/2024    INR 1.03 02/04/2024    INR 1.11 02/03/2024    PROTIME 14.7 (H) 02/14/2024    PROTIME 13.4 02/04/2024    PROTIME 14.2 02/03/2024           Current Facility-Administered Medications:     acetaminophen (TYLENOL) tablet 975 mg, 975 mg, Oral, Q8H JILLIAN, Vanesa Sheehan, CRNP, 975 mg at 03/09/24 0502    albuterol (PROVENTIL HFA,VENTOLIN HFA) inhaler 2 puff, 2 puff, Inhalation, Q6H PRN, Vanesa Shehean, CRNP    aspirin chewable tablet 81 mg, 81 mg, Oral, Daily, Vanesa Sheehan, CRNP, 81 mg at 03/09/24 0902    buPROPion (WELLBUTRIN XL) 24 hr tablet 300 mg, 300 mg, Oral, Daily, Vanesa Sheehan, CRNP, 300 mg at 03/09/24 0904    cefpodoxime (VANTIN) tablet 400 mg, 400 mg, Oral, BID With Meals, Vanesa Sheehan, CRNP, 400 mg at 03/09/24 0901    clonazePAM (KlonoPIN) tablet 1 mg, 1 mg, Oral, QPM, Vanesa Sheehan, CRNP, 1 mg at 03/08/24 1754    cyanocobalamin (VITAMIN B-12) tablet 1,000 mcg, 1,000 mcg, Oral, Daily, Vanesa Sheehan, CRNP, 1,000 mcg at 03/09/24 0902    fish oil capsule 1,000 mg, 1,000 mg, Oral, Daily, Vanesa Sheehan, CRNP, 1,000 mg at 03/09/24 0904    fluticasone-vilanterol 200-25 mcg/actuation 1 puff, 1 puff, Inhalation, Daily, ROXANE Bear, 1 puff at 03/09/24 0904    hydrALAZINE (APRESOLINE) tablet 25 mg, 25 mg, Oral, Q8H PRN, ROXANE Bear    insulin lispro (HumALOG/ADMELOG) 100 units/mL subcutaneous injection 1-5 Units, 1-5 Units, Subcutaneous, TID AC, 2 Units at 03/09/24 1118 **AND** Fingerstick Glucose (POCT), , , TID AC, ROXANE Bear    insulin  lispro (HumALOG/ADMELOG) 100 units/mL subcutaneous injection 1-5 Units, 1-5 Units, Subcutaneous, HS, Vanesa Sheehan, CRNP, 1 Units at 03/07/24 2146    metFORMIN (GLUCOPHAGE) tablet 500 mg, 500 mg, Oral, BID With Meals, Vanesa Sheehan, CRNP, 500 mg at 03/09/24 0901    methocarbamol (ROBAXIN) tablet 250 mg, 250 mg, Oral, Q8H JILLIAN, Vanesa Sheehan, CRNP, 250 mg at 03/09/24 0502    multivitamin-minerals (CENTRUM) tablet 1 tablet, 1 tablet, Oral, Daily, Vanesa Sheehan, CRNP, 1 tablet at 03/09/24 0901    nicotine (NICODERM CQ) 14 mg/24hr TD 24 hr patch 1 patch, 1 patch, Transdermal, Daily, Vanesa Sheehan, CRNP, 1 patch at 03/09/24 0902    oxyCODONE (ROXICODONE) split tablet 2.5 mg, 2.5 mg, Oral, Q4H PRN **OR** oxyCODONE (ROXICODONE) IR tablet 5 mg, 5 mg, Oral, Q4H PRN, Vanesa Sheehan, CRNP, 5 mg at 03/06/24 2014    pantoprazole (PROTONIX) EC tablet 40 mg, 40 mg, Oral, Early Morning, Vanesa Sheehan, CRNP, 40 mg at 03/09/24 0502    polyethylene glycol (MIRALAX) packet 17 g, 17 g, Oral, Daily, Vanesa Sheehan, CRNP, 17 g at 03/08/24 0824    pregabalin (LYRICA) capsule 200 mg, 200 mg, Oral, TID, Vanesa Sheehan, CRNP, 200 mg at 03/09/24 0901    rivaroxaban (XARELTO) tablet 2.5 mg, 2.5 mg, Oral, BID, Vanesa Sheehan, CRNP, 2.5 mg at 03/09/24 0904    senna-docusate sodium (SENOKOT S) 8.6-50 mg per tablet 1 tablet, 1 tablet, Oral, BID, Vanesa Sheehan, CRNP, 1 tablet at 03/09/24 0900    Past Medical History:   Diagnosis Date    Anxiety     Closed fracture of coccyx ([facility]) 05/24/2023    Diabetes mellitus (HCC)     GERD (gastroesophageal reflux disease)     Hyperlipidemia     Hypertension     IBS (irritable bowel syndrome)     Shoulder fracture        Patient Active Problem List    Diagnosis Date Noted    Surgical site infection 02/13/2024    Sacral wound 02/23/2024    Impulsive 02/23/2024    Other dietary vitamin B12 deficiency anemia 02/16/2024    Leukocytosis 02/13/2024    Sepsis without acute organ dysfunction (HCC) 02/13/2024    At risk for venous  thromboembolism (VTE) 02/09/2024    At high risk for skin breakdown 02/09/2024    Wound of skin 02/09/2024    Impaired mobility and activities of daily living 02/09/2024    Acute pain due to injury 02/09/2024    Hematoma 02/06/2024    Anemia 02/06/2024    Constipation 01/26/2024    Preoperative cardiovascular examination 01/25/2024    Diabetic foot ulcer with osteomyelitis (HCC) 01/23/2024    Ankle ulcer, right, with fat layer exposed (HCC) 12/14/2023    Age-related osteoporosis without current pathological fracture 11/28/2023    Abnormal CT of the chest 10/17/2023    COPD, severity to be determined ([facility]) 10/17/2023    Tobacco use disorder 10/17/2023    PAD (peripheral artery disease) ([facility]) 10/10/2023    Bladder neoplasm 09/11/2023    Left renal mass 09/11/2023    Lactic acidosis 08/11/2023    Hypomagnesemia 08/11/2023    Degenerative lumbar disc 05/24/2023    Urinary incontinence 04/13/2023    GERD without esophagitis 10/16/2019    Essential hypertension 02/15/2019    Anxiety and depression 02/15/2019    Type 2 diabetes mellitus with diabetic polyneuropathy (HCC) 02/12/2019          Rigo Hoover, DO  Physical Medicine and Rehabilitation  [facility]    I have spent a total time of 25 minutes on 03/09/24 in caring for this patient including Counseling / Coordination of care, Documenting in the medical record, and Communicating with other healthcare professionals .      ** Please Note:  voice to text software may have been used in the creation of this document. Although proof errors in transcription or interpretation are a potential of such software**

## 2024-03-09 NOTE — PLAN OF CARE
Problem: PAIN - ADULT  Goal: Verbalizes/displays adequate comfort level or baseline comfort level  Description: Interventions:  - Encourage patient to monitor pain and request assistance  - Assess pain using appropriate pain scale  - Administer analgesics based on type and severity of pain and evaluate response  - Implement non-pharmacological measures as appropriate and evaluate response  - Consider cultural and social influences on pain and pain management  - Notify physician/advanced practitioner if interventions unsuccessful or patient reports new pain  Outcome: Progressing     Problem: SAFETY ADULT  Goal: Patient will remain free of falls  Description: INTERVENTIONS:  - Educate patient/family on patient safety including physical limitations  - Instruct patient to call for assistance with activity   - Consult OT/PT to assist with strengthening/mobility   - Keep Call bell within reach  - Keep bed low and locked with side rails adjusted as appropriate  - Keep care items and personal belongings within reach  - Initiate and maintain comfort rounds  - Make Fall Risk Sign visible to staff  - Offer Toileting every 2 Hours, in advance of need  - Initiate/Maintain bed alarm  - Obtain necessary fall risk management equipment: bed alarm  - Apply yellow socks and bracelet for high fall risk patients  - Consider moving patient to room near nurses station  Outcome: Progressing

## 2024-03-09 NOTE — PROGRESS NOTES
03/09/24 1230   Pain Assessment   Pain Assessment Tool 0-10   Pain Score No Pain   Restrictions/Precautions   Precautions Bed/chair alarms;Cognitive;Fall Risk;Impulsive;Supervision on toilet/commode   RLE Weight Bearing Per Order NWB   Braces or Orthoses   (prevalon boot R foot at all times)   Subjective   Subjective Pt agreeable to perform skilled PT   Roll Left and Right   Type of Assistance Needed Independent   Roll Left and Right CARE Score 6   Sit to Lying   Type of Assistance Needed Independent   Sit to Lying CARE Score 6   Lying to Sitting on Side of Bed   Type of Assistance Needed Independent   Lying to Sitting on Side of Bed CARE Score 6   Sit to Stand   Type of Assistance Needed Incidental touching   Comment [place] to RW   Sit to Stand CARE Score 4   Bed-Chair Transfer   Type of Assistance Needed Incidental touching   Comment CgA / CS lvl sit /stand pivot modified   Chair/Bed-to-Chair Transfer CARE Score 4   Transfer Bed/Chair/Wheelchair   Adaptive Equipment None   Car Transfer   Type of Assistance Needed Incidental touching   Comment standing pivot w/ RW d/t pt diffuclty with RW management   Car Transfer CARE Score 4   Walk 10 Feet   Type of Assistance Needed Physical assistance   Physical Assistance Level 51%-75%   Comment RW hopping LLE   Walk 10 Feet CARE Score 2   Walk 50 Feet with Two Turns   Reason if not Attempted Safety concerns   Walk 50 Feet with Two Turns CARE Score 88   Walk 150 Feet   Reason if not Attempted Safety concerns   Walk 150 Feet CARE Score 88   Walking 10 Feet on Uneven Surfaces   Reason if not Attempted Safety concerns   Walking 10 Feet on Uneven Surfaces CARE Score 88   Ambulation   Primary Mode of Locomotion Prior to Admission Walk   Distance Walked (feet) 10 ft   Findings hopping LLE and very poor balance and jean w SPT using RW poor RW management   Does the patient walk? 2. Yes   Wheel 50 Feet with Two Turns   Type of Assistance Needed Set-up / clean-up   Wheel 50 Feet with  Two Turns CARE Score 5   Wheel 150 Feet   Type of Assistance Needed Set-up / clean-up   Wheel 150 Feet CARE Score 5   Wheelchair mobility   Does the patient use a wheelchair? 1. Yes   Type of Wheelchair Used 1. Manual   Method Right upper extremity;Left upper extremity;Right lower extremity   Distance Level Surface (feet) 200 ft   Curb or Single Stair   Reason if not Attempted Safety concerns   1 Step (Curb) CARE Score 88   4 Steps   Reason if not Attempted Safety concerns   4 Steps CARE Score 88   12 Steps   Reason if not Attempted Safety concerns   12 Steps CARE Score 88   Picking Up Object   Reason if not Attempted Safety concerns   Picking Up Object CARE Score 88   Therapeutic Interventions   Strengthening seated LAQ AP marching ball add-10-20 reps   Equipment Use   NuStep lvl 2 for 10 min   Assessment   Treatment Assessment pt perform F/T with her family and hands -on for car transfers outside , VC and instructed dgther and care giver for body positioning and hand -on , pt dghter and other care giver feels they can perform car transfer safety and also VC for WC management if needed . Both family and care giver understand no fall at home and WC safety fopr locking breaks before any transfers ot apollo or doffing WC legs and arm rest . Also instructed family memebers on pt cognition for safety an at times impulsive. Pt primary mobility is WC level and sit/stand pivot and no hopping 10 feet at this time . Keep workin on hopping with RW d/t pt may half to hop into bathroom at home. Pt will cont F/T next week if needed and dghter bring in sock and shoes to better hopping and WC mobility . Pt in bed lvl with alarm on and ll needs in reach   Barriers to Discharge Inaccessible home environment;Decreased caregiver support   Plan   Progress Progressing toward goals   Discharge Recommendation   Rehab Resource Intensity Level, PT   (HH PT)   PT Therapy Minutes   PT Time In 1230   PT Time Out 1400   PT Total Time (minutes) 90    PT Mode of treatment - Individual (minutes) 90   PT Mode of treatment - Concurrent (minutes) 0   PT Mode of treatment - Group (minutes) 0   PT Mode of treatment - Co-treat (minutes) 0   PT Mode of Treatment - Total time(minutes) 90 minutes   PT Cumulative Minutes 420   Therapy Time missed   Time missed? No

## 2024-03-09 NOTE — PROGRESS NOTES
[facility]  Progress Note  Name: Lona Cedeno I  MRN: [id number]  Unit/Bed#: -01 I Date of Admission: 3/5/2024   Date of Service: 3/9/2024 I Hospital Day: 4    Assessment/Plan   * Surgical site infection  Assessment & Plan  S/p right axilla/femoral bypass 1/31/2024  Postop infection s/p washout/debridement 2/14 and 2/19  VAC removed 2/28/2024  Continue oral antibiotics through 3/22/2024  Cultures positive Klebsiella/Proteus  Continue local dressing changes  Will consult vascular surgery if needed for local care  Refer to media    Diabetic foot ulcer with osteomyelitis (HCC)  Assessment & Plan  Lab Results   Component Value Date    HGBA1C 6.0 (H) 02/28/2024       Recent Labs     03/08/24  1556 03/08/24  2114 03/09/24  0628 03/09/24  1042   POCGLU 110 144* 140 223*         Blood Sugar Average: Last 72 hrs:  (P) 133.264305619464  S/p medial ulcer debridement/partial second toe amputation 2/4/2024  Recent STSG on 2/28/2024 with VAC removal 3/4/2024  Continue local care  NWB  podiatry following for management  PMR managing therapy/pain      Tobacco use disorder  Assessment & Plan  Continue nicotine patch  Recommend cessation    PAD (peripheral artery disease) ([facility])  Assessment & Plan  S/p right axillo-femoral bypass 1/31/2024  Continue aspirin/statin/low-dose Xarelto    Essential hypertension  Assessment & Plan  Hydralazine as needed.  Was taking Lisinopril 5mg daily and amiloride 5mg daily prior to admission.  stable    Type 2 diabetes mellitus with diabetic polyneuropathy (HCC)  Assessment & Plan  Lab Results   Component Value Date    HGBA1C 6.0 (H) 02/28/2024       Recent Labs     03/08/24  1556 03/08/24  2114 03/09/24  0628 03/09/24  1042   POCGLU 110 144* 140 223*         Blood Sugar Average: Last 72 hrs:  (P) 133.3483205383963314  Home: Takes metformin 1000 mg twice daily/Januvia 100 mg at lunchtime  Here: Sliding scale/DM diet/metformin 500mg bid  Continue  metformin as above and move towards getting back to oral meds  Blood sugar elevated before lunch. Will continue to monitor trend.             The above assessment and plan was reviewed and updated as determined by my evaluation of the patient on 3/9/2024.    Labs:   Results from last 7 days   Lab Units 03/04/24  0540 03/03/24  0631   WBC Thousand/uL 9.33 8.83   HEMOGLOBIN g/dL 11.6 11.1*   HEMATOCRIT % 37.9 35.4   PLATELETS Thousands/uL 280 277     Results from last 7 days   Lab Units 03/04/24  0540 03/03/24  0631   SODIUM mmol/L 137 140   POTASSIUM mmol/L 4.1 4.2   CHLORIDE mmol/L 101 103   CO2 mmol/L 30 31   BUN mg/dL 21 17   CREATININE mg/dL 0.47* 0.48*   CALCIUM mg/dL 9.3 9.5             Results from last 7 days   Lab Units 03/09/24  1042 03/09/24  0628 03/08/24  2114   POC GLUCOSE mg/dl 223* 140 144*       Imaging  No orders to display       Review of Scheduled Meds:  Current Facility-Administered Medications   Medication Dose Route Frequency Provider Last Rate    acetaminophen  975 mg Oral Q8H JILLIAN Vanesa Sheehan, CRNP      albuterol  2 puff Inhalation Q6H PRN Vanesa Sheehan, CRNP      aspirin  81 mg Oral Daily Vanesa Sheehan, CRNP      buPROPion  300 mg Oral Daily Vanesa Sheehan, CRNP      cefpodoxime  400 mg Oral BID With Meals Vanesa Sheehan, CRNP      clonazePAM  1 mg Oral QPM Vanesa Sheehan, CRNP      cyanocobalamin  1,000 mcg Oral Daily Vanesa Sheehan, CRNP      fish oil  1,000 mg Oral Daily Vanesa Sheehan, CRNP      fluticasone-vilanterol  1 puff Inhalation Daily Vanesa Sheehan, CRNP      hydrALAZINE  25 mg Oral Q8H PRN Vanesa Sheehan, CRNP      insulin lispro  1-5 Units Subcutaneous TID AC Vanesa Sheehan, CRNP      insulin lispro  1-5 Units Subcutaneous HS Vanesa Sheehan, CRNP      metFORMIN  500 mg Oral BID With Meals Vanesa Sheehan, CRNP      methocarbamol  250 mg Oral Q8H JILLIAN Vanesa Sheehan, CRNP      multivitamin-minerals  1 tablet Oral Daily Vanesa Sheehan, CRNP      nicotine  1 patch Transdermal Daily Vanesa Sheehan, CRNP       oxyCODONE  2.5 mg Oral Q4H PRN Vanesa Sheehan, CRNP      Or    oxyCODONE  5 mg Oral Q4H PRN Vanesa Sheehan, CRNP      pantoprazole  40 mg Oral Early Morning Vanesa Sheehan, CRNP      polyethylene glycol  17 g Oral Daily Vanesa Sheehan, CRNP      pregabalin  200 mg Oral TID Vanesa Sheehan, CRNP      rivaroxaban  2.5 mg Oral BID Vanesa Sheehan, CRNP      senna-docusate sodium  1 tablet Oral BID Vanesa Sheehan, CRNP         Subjective/ HPI: Patient seen and examined. Patients overnight issues or events were reviewed with nursing staff. New or overnight issues include the following:     Pt seen in her room. She states that she is doing well. She denies any current complaints.    ROS:   A 10 point ROS was performed; negative except as noted above.        *Labs /Radiology studies Reviewed  *Medications  reviewed and reconciled as needed  *Please refer to order section for additional ordered labs studies      Physical Examination:  Vitals:   Vitals:    03/08/24 1500 03/08/24 2024 03/08/24 2100 03/09/24 0511   BP: 131/61 (!) 106/49 110/65 116/57   BP Location: Left arm Left arm Left arm Left arm   Pulse: 94 78  67   Resp: 17 18  18   Temp: 98 °F (36.7 °C) 97.9 °F (36.6 °C)  97.6 °F (36.4 °C)   TempSrc: Oral Oral  Oral   SpO2: 94% 91%  95%   Weight:           General Appearance: NAD; pleasant  HEENT: PERRLA, conjuctiva normal; mucous membranes moist; face symmetrical  Neck:  Supple  Lungs: clear bilaterally, normal respiratory effort, no retractions, expiratory effort normal, on room air  CV: regular rate and rhythm, no murmurs rubs or gallops noted   ABD: soft non tender, +BS x4  EXT: Rt foot in boot  Skin: normal turgor, normal texture,  Psych: affect normal, mood normal  Neuro: AAOx3       The above physical exam was reviewed and updated as determined by my evaluation of the patient on 3/9/2024.    Invasive Devices       Drain  Duration             Ureteral Internal Stent Left ureter 6 Fr. 110 days                       VTE  Pharmacologic Prophylaxis: Xarelto  Code Status: Level 3 - DNAR and DNI  Current Length of Stay: 4 day(s)    Total floor / unit time spent today 30 minutes  Coordination of patient's care was performed in conjunction with primary service. Time invested included review of patient's labs, vitals, and management of their comorbidities with continued monitoring, examination of patient as well as answering patient questions, documenting her findings and creating progress note in electronic medical record,  ordering appropriate diagnostic testing.       ** Please Note:  voice to text software may have been used in the creation of this document. Although proof errors in transcription or interpretation are a potential of such software**

## 2024-03-09 NOTE — PROGRESS NOTES
03/09/24 0700   Pain Assessment   Pain Assessment Tool 0-10   Pain Score No Pain   Restrictions/Precautions   Precautions Bed/chair alarms;Cognitive;Fall Risk;Impulsive;Supervision on toilet/commode   Weight Bearing Restrictions Yes   RLE Weight Bearing Per Order NWB   ROM Restrictions No   Braces or Orthoses   (prevalon boot R foot at all times)   Eating   Type of Assistance Needed Independent   Physical Assistance Level No physical assistance   Comment seated for breakfast meal at end of OT session   Eating CARE Score 6   Oral Hygiene   Type of Assistance Needed Set-up / clean-up   Physical Assistance Level No physical assistance   Comment S/U A seated EOB to brush teeth   Oral Hygiene CARE Score 5   Grooming   Able To Initiate Tasks;Comb/Brush Hair;Wash/Dry Face;Brush/Clean Teeth;Wash/Dry Hands   Limitation Noted In Strength;Timeliness;Safety   Findings S/U A for all grooming tasks while seated EOB   Shower/Bathe Self   Type of Assistance Needed Supervision;Set-up / clean-up;Verbal cues   Physical Assistance Level No physical assistance   Comment Pt engaged in sponge bath with S/U A and Sup, able to wash 9/9 parts (R lower leg/foot not included 2* prevlon boot/dressing on at all times). Seated EOB, pt able to wash UB, B/L upper legs, and L lower leg/foot using dynamic reaching, no LOB. Supine to wash az/rear w/alternating logrolling technique. Frequent vc's required while seated EOB to maintain RLE NWB, inconsistent carryover.   Shower/Bathe Self CARE Score 4   Bathing   Assessed Bath Style Sponge Bath   Anticipated D/C Bath Style Sponge Bath   Able to Gather/Transport No   Able to Adjust Water Temperature No   Able to Wash/Rinse/Dry (body part) Left Arm;Right Arm;L Upper Leg;R Upper Leg;L Lower Leg/Foot;Chest;Abdomen;Perineal Area;Buttocks   Limitations Noted in Balance;Coordination;Endurance;Problem Solving;Safety;Strength;Sequencing;Timeliness   Positioning Seated;Supine   Tub/Shower Transfer   Reason Not  Assessed Sponge Bath;Medical   Upper Body Dressing   Type of Assistance Needed Supervision;Set-up / clean-up   Physical Assistance Level No physical assistance   Comment seated EOB   Upper Body Dressing CARE Score 4   Lower Body Dressing   Type of Assistance Needed Supervision;Verbal cues   Physical Assistance Level No physical assistance   Comment Sup seated EOB to thread LEs through undergarment and pants, with increased time for pt to manage clothing over R prevalon boot, then Sup while rolling in supine in bed for CM over hips, with mod vc's to maintain NWB   Lower Body Dressing CARE Score 4   Putting On/Taking Off Footwear   Type of Assistance Needed Physical assistance;Verbal cues   Physical Assistance Level 26%-50%   Comment seated EOB to doff/don L sock mod I, with A to adjust R prevalon boot   Putting On/Taking Off Footwear CARE Score 3   Dressing/Undressing Clothing   Remove UB Clothes Pullover Shirt   Don UB Clothes Pullover Shirt   Remove LB Clothes Undergarment;Socks   Don LB Clothes Pants;Undergarment;Socks   Limitations Noted In Balance;Coordination;Endurance;Problem Solving;Safety;Sequencing;Strength;Timeliness   Positioning Sit Edge Of Bed;In Bed   Roll Left and Right   Type of Assistance Needed Set-up / clean-up   Physical Assistance Level No physical assistance   Roll Left and Right CARE Score 5   Lying to Sitting on Side of Bed   Type of Assistance Needed Supervision   Physical Assistance Level No physical assistance   Comment HOB flat, no rails   Lying to Sitting on Side of Bed CARE Score 4   Bed-Chair Transfer   Type of Assistance Needed Incidental touching;Verbal cues   Physical Assistance Level No physical assistance   Comment CGA-CS sit-pivot transfers, pt still moves impulsively at times requiring vc's for safety awareness and to slow down pacing to decrease fall risk. Mod vc's to maintain RLE NWB, with fair/ inconsistent carryover   Chair/Bed-to-Chair Transfer CARE Score 4   Exercise Tools    Exercise Tools Yes   UE Ergometer Seated in w/c with Sup, tabletop UEB bilaterally against min resistance for 5min prograde, 5min retrograde, for increased BUE strength for improved safety/independence during fxl transfers. Pt tolerated well with rest break x1 to manage c/o min fatigue.   Cognition   Overall Cognitive Status Impaired   Arousal/Participation Alert;Cooperative   Attention Attends with cues to redirect   Orientation Level Oriented X4   Memory Decreased short term memory;Decreased recall of precautions   Following Commands Follows one step commands with increased time or repetition   Activity Tolerance   Activity Tolerance Patient tolerated treatment well   Assessment   Treatment Assessment Pt seen for 90min skilled OT session focused on ADL skills retraining (bed-level/EOB sponge bath), fxl sit-pivot transfer training, carryover of RLE NWB precaution, and UE strengthening, for increased independence w/ADLs/IADLs and decreased caregiver burden. See detailed descriptions of fxl performance above. Pt tolerated session but upon therapist arrival reported significant fatigue due to rigorous PT session yesterday. Pt required frequent rest breaks and vc's for encouragement to manage fatigue. Pt cont to require mod vc's to maintain RLE NWB status during transfers and fxl ADL activities EOB, with pt cont to be impulsive at times with attempting to stand during ADL routine and with moving rapidly through transfers. Pt cont to be limited by decreased fxl cognition, standing balance/tolerance, [place], impulsive, endurance, strength, and act shavon. Pt would benefit from continued skilled OT focused on ADL retraining, fxl sit-pivot transfer training, w/c level IADL retraining, med mgmt, fxl cognition, fxl attention, DME training/edu, and ECT edu.   Prognosis Fair   Problem List Decreased strength;Decreased endurance;Impaired balance;Decreased mobility;Decreased cognition;Impaired judgement;Decreased safety  awareness;Impaired hearing;Decreased skin integrity;Orthopedic restrictions   Plan   Treatment/Interventions ADL retraining;Functional transfer training;Therapeutic exercise;Endurance training;Cognitive reorientation;Patient/family training;Equipment eval/education;Bed mobility;Compensatory technique education   Progress Progressing toward goals   Discharge Recommendation   Rehab Resource Intensity Level, OT   (pending)   OT Therapy Minutes   OT Time In 0700   OT Time Out 0830   OT Total Time (minutes) 90   OT Mode of treatment - Individual (minutes) 90   OT Mode of treatment - Concurrent (minutes) 0   OT Mode of treatment - Group (minutes) 0   OT Mode of treatment - Co-treat (minutes) 0   OT Mode of Treatment - Total time(minutes) 90 minutes   OT Cumulative Minutes 300   Therapy Time missed   Time missed? No

## 2024-03-10 LAB
GLUCOSE SERPL-MCNC: 116 MG/DL (ref 65–140)
GLUCOSE SERPL-MCNC: 168 MG/DL (ref 65–140)
GLUCOSE SERPL-MCNC: 173 MG/DL (ref 65–140)
GLUCOSE SERPL-MCNC: 88 MG/DL (ref 65–140)

## 2024-03-10 PROCEDURE — 82948 REAGENT STRIP/BLOOD GLUCOSE: CPT

## 2024-03-10 RX ORDER — LANOLIN ALCOHOL/MO/W.PET/CERES
6 CREAM (GRAM) TOPICAL
Status: DISCONTINUED | OUTPATIENT
Start: 2024-03-10 | End: 2024-03-13

## 2024-03-10 RX ADMIN — ACETAMINOPHEN 975 MG: 325 TABLET, FILM COATED ORAL at 13:06

## 2024-03-10 RX ADMIN — INSULIN LISPRO 1 UNITS: 100 INJECTION, SOLUTION INTRAVENOUS; SUBCUTANEOUS at 21:14

## 2024-03-10 RX ADMIN — METHOCARBAMOL 250 MG: 500 TABLET ORAL at 05:38

## 2024-03-10 RX ADMIN — Medication 6 MG: at 23:18

## 2024-03-10 RX ADMIN — METHOCARBAMOL 250 MG: 500 TABLET ORAL at 21:13

## 2024-03-10 RX ADMIN — ACETAMINOPHEN 975 MG: 325 TABLET, FILM COATED ORAL at 05:38

## 2024-03-10 RX ADMIN — FLUTICASONE FUROATE AND VILANTEROL TRIFENATATE 1 PUFF: 200; 25 POWDER RESPIRATORY (INHALATION) at 08:15

## 2024-03-10 RX ADMIN — METFORMIN HYDROCHLORIDE 500 MG: 500 TABLET, FILM COATED ORAL at 17:21

## 2024-03-10 RX ADMIN — BUPROPION HYDROCHLORIDE 300 MG: 150 TABLET, FILM COATED, EXTENDED RELEASE ORAL at 08:16

## 2024-03-10 RX ADMIN — CEFPODOXIME PROXETIL 400 MG: 200 TABLET, FILM COATED ORAL at 17:21

## 2024-03-10 RX ADMIN — POLYETHYLENE GLYCOL 3350 17 G: 17 POWDER, FOR SOLUTION ORAL at 08:14

## 2024-03-10 RX ADMIN — PREGABALIN 200 MG: 100 CAPSULE ORAL at 17:21

## 2024-03-10 RX ADMIN — SENNOSIDES, DOCUSATE SODIUM 1 TABLET: 8.6; 5 TABLET ORAL at 17:21

## 2024-03-10 RX ADMIN — PANTOPRAZOLE SODIUM 40 MG: 40 TABLET, DELAYED RELEASE ORAL at 05:39

## 2024-03-10 RX ADMIN — INSULIN LISPRO 1 UNITS: 100 INJECTION, SOLUTION INTRAVENOUS; SUBCUTANEOUS at 11:23

## 2024-03-10 RX ADMIN — CEFPODOXIME PROXETIL 400 MG: 200 TABLET, FILM COATED ORAL at 08:15

## 2024-03-10 RX ADMIN — PREGABALIN 200 MG: 100 CAPSULE ORAL at 08:15

## 2024-03-10 RX ADMIN — Medication 1 TABLET: at 08:15

## 2024-03-10 RX ADMIN — CYANOCOBALAMIN TAB 500 MCG 1000 MCG: 500 TAB at 08:15

## 2024-03-10 RX ADMIN — OMEGA-3 FATTY ACIDS CAP 1000 MG 1000 MG: 1000 CAP at 08:16

## 2024-03-10 RX ADMIN — RIVAROXABAN 2.5 MG: 2.5 TABLET, FILM COATED ORAL at 08:16

## 2024-03-10 RX ADMIN — ASPIRIN 81 MG CHEWABLE TABLET 81 MG: 81 TABLET CHEWABLE at 08:15

## 2024-03-10 RX ADMIN — ACETAMINOPHEN 975 MG: 325 TABLET, FILM COATED ORAL at 21:13

## 2024-03-10 RX ADMIN — METFORMIN HYDROCHLORIDE 500 MG: 500 TABLET, FILM COATED ORAL at 08:15

## 2024-03-10 RX ADMIN — PREGABALIN 200 MG: 100 CAPSULE ORAL at 21:13

## 2024-03-10 RX ADMIN — SENNOSIDES, DOCUSATE SODIUM 1 TABLET: 8.6; 5 TABLET ORAL at 08:15

## 2024-03-10 RX ADMIN — RIVAROXABAN 2.5 MG: 2.5 TABLET, FILM COATED ORAL at 17:22

## 2024-03-10 RX ADMIN — METHOCARBAMOL 250 MG: 500 TABLET ORAL at 13:06

## 2024-03-10 RX ADMIN — NICOTINE 1 PATCH: 14 PATCH, EXTENDED RELEASE TRANSDERMAL at 08:13

## 2024-03-10 RX ADMIN — CLONAZEPAM 1 MG: 1 TABLET ORAL at 17:21

## 2024-03-10 NOTE — PLAN OF CARE
Problem: PAIN - ADULT  Goal: Verbalizes/displays adequate comfort level or baseline comfort level  Description: Interventions:  - Encourage patient to monitor pain and request assistance  - Assess pain using appropriate pain scale  - Administer analgesics based on type and severity of pain and evaluate response  - Implement non-pharmacological measures as appropriate and evaluate response  - Consider cultural and social influences on pain and pain management  - Notify physician/advanced practitioner if interventions unsuccessful or patient reports new pain  Outcome: Progressing     Problem: INFECTION - ADULT  Goal: Absence or prevention of progression during hospitalization  Description: INTERVENTIONS:  - Assess and monitor for signs and symptoms of infection  - Monitor lab/diagnostic results  - Monitor all insertion sites, i.e. indwelling lines, tubes, and drains  - Monitor endotracheal if appropriate and nasal secretions for changes in amount and color  - [place] appropriate cooling/warming therapies per order  - Administer medications as ordered  - Instruct and encourage patient and family to use good hand hygiene technique  - Identify and instruct in appropriate isolation precautions for identified infection/condition  Outcome: Progressing     Problem: SAFETY ADULT  Goal: Patient will remain free of falls  Description: INTERVENTIONS:  - Educate patient/family on patient safety including physical limitations  - Instruct patient to call for assistance with activity   - Consult OT/PT to assist with strengthening/mobility   - Keep Call bell within reach  - Keep bed low and locked with side rails adjusted as appropriate  - Keep care items and personal belongings within reach  - Initiate and maintain comfort rounds  - Make Fall Risk Sign visible to staff  - Offer Toileting every 2 Hours, in advance of need  - Initiate/Maintain bed/chair alarm  - Obtain necessary fall risk management equipment: nonskid footwear  -  Apply yellow socks and bracelet for high fall risk patients  - Consider moving patient to room near nurses station  Outcome: Progressing

## 2024-03-11 LAB
ANION GAP SERPL CALCULATED.3IONS-SCNC: 9 MMOL/L
BASOPHILS # BLD AUTO: 0.06 THOUSANDS/ÂΜL (ref 0–0.1)
BASOPHILS NFR BLD AUTO: 1 % (ref 0–1)
BUN SERPL-MCNC: 17 MG/DL (ref 5–25)
CALCIUM SERPL-MCNC: 9.8 MG/DL (ref 8.4–10.2)
CHLORIDE SERPL-SCNC: 102 MMOL/L (ref 96–108)
CO2 SERPL-SCNC: 30 MMOL/L (ref 21–32)
CREAT SERPL-MCNC: 0.62 MG/DL (ref 0.6–1.3)
EOSINOPHIL # BLD AUTO: 0.57 THOUSAND/ÂΜL (ref 0–0.61)
EOSINOPHIL NFR BLD AUTO: 8 % (ref 0–6)
ERYTHROCYTE [DISTWIDTH] IN BLOOD BY AUTOMATED COUNT: 14.3 % (ref 11.6–15.1)
GFR SERPL CREATININE-BSD FRML MDRD: 87 ML/MIN/1.73SQ M
GLUCOSE P FAST SERPL-MCNC: 141 MG/DL (ref 65–99)
GLUCOSE SERPL-MCNC: 133 MG/DL (ref 65–140)
GLUCOSE SERPL-MCNC: 141 MG/DL (ref 65–140)
GLUCOSE SERPL-MCNC: 152 MG/DL (ref 65–140)
GLUCOSE SERPL-MCNC: 158 MG/DL (ref 65–140)
GLUCOSE SERPL-MCNC: 99 MG/DL (ref 65–140)
HCT VFR BLD AUTO: 39 % (ref 34.8–46.1)
HGB BLD-MCNC: 12.1 G/DL (ref 11.5–15.4)
IMM GRANULOCYTES # BLD AUTO: 0.02 THOUSAND/UL (ref 0–0.2)
IMM GRANULOCYTES NFR BLD AUTO: 0 % (ref 0–2)
LYMPHOCYTES # BLD AUTO: 1.57 THOUSANDS/ÂΜL (ref 0.6–4.47)
LYMPHOCYTES NFR BLD AUTO: 22 % (ref 14–44)
MCH RBC QN AUTO: 31 PG (ref 26.8–34.3)
MCHC RBC AUTO-ENTMCNC: 31 G/DL (ref 31.4–37.4)
MCV RBC AUTO: 100 FL (ref 82–98)
MONOCYTES # BLD AUTO: 0.57 THOUSAND/ÂΜL (ref 0.17–1.22)
MONOCYTES NFR BLD AUTO: 8 % (ref 4–12)
NEUTROPHILS # BLD AUTO: 4.44 THOUSANDS/ÂΜL (ref 1.85–7.62)
NEUTS SEG NFR BLD AUTO: 61 % (ref 43–75)
NRBC BLD AUTO-RTO: 0 /100 WBCS
PLATELET # BLD AUTO: 318 THOUSANDS/UL (ref 149–390)
PMV BLD AUTO: 10.1 FL (ref 8.9–12.7)
POTASSIUM SERPL-SCNC: 4 MMOL/L (ref 3.5–5.3)
RBC # BLD AUTO: 3.9 MILLION/UL (ref 3.81–5.12)
SODIUM SERPL-SCNC: 141 MMOL/L (ref 135–147)
WBC # BLD AUTO: 7.23 THOUSAND/UL (ref 4.31–10.16)

## 2024-03-11 PROCEDURE — 97110 THERAPEUTIC EXERCISES: CPT

## 2024-03-11 PROCEDURE — 97542 WHEELCHAIR MNGMENT TRAINING: CPT

## 2024-03-11 PROCEDURE — 85025 COMPLETE CBC W/AUTO DIFF WBC: CPT | Performed by: NURSE PRACTITIONER

## 2024-03-11 PROCEDURE — 99232 SBSQ HOSP IP/OBS MODERATE 35: CPT | Performed by: INTERNAL MEDICINE

## 2024-03-11 PROCEDURE — 97535 SELF CARE MNGMENT TRAINING: CPT

## 2024-03-11 PROCEDURE — 80048 BASIC METABOLIC PNL TOTAL CA: CPT | Performed by: NURSE PRACTITIONER

## 2024-03-11 PROCEDURE — 99232 SBSQ HOSP IP/OBS MODERATE 35: CPT | Performed by: PHYSICAL MEDICINE & REHABILITATION

## 2024-03-11 PROCEDURE — NC001 PR NO CHARGE: Performed by: INTERNAL MEDICINE

## 2024-03-11 PROCEDURE — NC001 PR NO CHARGE: Performed by: PODIATRIST

## 2024-03-11 PROCEDURE — 97530 THERAPEUTIC ACTIVITIES: CPT

## 2024-03-11 PROCEDURE — 82948 REAGENT STRIP/BLOOD GLUCOSE: CPT

## 2024-03-11 RX ORDER — ASPIRIN 81 MG/1
81 TABLET ORAL DAILY
Status: CANCELLED
Start: 2024-03-11

## 2024-03-11 RX ORDER — PANTOPRAZOLE SODIUM 40 MG/1
40 TABLET, DELAYED RELEASE ORAL DAILY
Status: CANCELLED
Start: 2024-03-11

## 2024-03-11 RX ORDER — CEFPODOXIME PROXETIL 200 MG/1
400 TABLET, FILM COATED ORAL 2 TIMES DAILY WITH MEALS
Qty: 17 TABLET | Refills: 0 | Status: CANCELLED | OUTPATIENT
Start: 2024-03-14 | End: 2024-03-23

## 2024-03-11 RX ORDER — ATORVASTATIN CALCIUM 10 MG/1
10 TABLET, FILM COATED ORAL
Status: DISCONTINUED | OUTPATIENT
Start: 2024-03-11 | End: 2024-03-14 | Stop reason: HOSPADM

## 2024-03-11 RX ADMIN — METFORMIN HYDROCHLORIDE 500 MG: 500 TABLET, FILM COATED ORAL at 17:08

## 2024-03-11 RX ADMIN — SENNOSIDES, DOCUSATE SODIUM 1 TABLET: 8.6; 5 TABLET ORAL at 17:08

## 2024-03-11 RX ADMIN — ACETAMINOPHEN 975 MG: 325 TABLET, FILM COATED ORAL at 22:01

## 2024-03-11 RX ADMIN — PANTOPRAZOLE SODIUM 40 MG: 40 TABLET, DELAYED RELEASE ORAL at 05:45

## 2024-03-11 RX ADMIN — POLYETHYLENE GLYCOL 3350 17 G: 17 POWDER, FOR SOLUTION ORAL at 08:32

## 2024-03-11 RX ADMIN — ACETAMINOPHEN 975 MG: 325 TABLET, FILM COATED ORAL at 05:45

## 2024-03-11 RX ADMIN — RIVAROXABAN 2.5 MG: 2.5 TABLET, FILM COATED ORAL at 17:13

## 2024-03-11 RX ADMIN — ASPIRIN 81 MG CHEWABLE TABLET 81 MG: 81 TABLET CHEWABLE at 08:31

## 2024-03-11 RX ADMIN — METHOCARBAMOL 250 MG: 500 TABLET ORAL at 05:45

## 2024-03-11 RX ADMIN — PREGABALIN 200 MG: 100 CAPSULE ORAL at 17:13

## 2024-03-11 RX ADMIN — CLONAZEPAM 1 MG: 1 TABLET ORAL at 17:08

## 2024-03-11 RX ADMIN — METHOCARBAMOL 250 MG: 500 TABLET ORAL at 22:01

## 2024-03-11 RX ADMIN — CYANOCOBALAMIN TAB 500 MCG 1000 MCG: 500 TAB at 08:31

## 2024-03-11 RX ADMIN — PREGABALIN 200 MG: 100 CAPSULE ORAL at 08:31

## 2024-03-11 RX ADMIN — INSULIN LISPRO 1 UNITS: 100 INJECTION, SOLUTION INTRAVENOUS; SUBCUTANEOUS at 12:17

## 2024-03-11 RX ADMIN — CEFPODOXIME PROXETIL 400 MG: 200 TABLET, FILM COATED ORAL at 17:13

## 2024-03-11 RX ADMIN — NICOTINE 1 PATCH: 14 PATCH, EXTENDED RELEASE TRANSDERMAL at 08:32

## 2024-03-11 RX ADMIN — OMEGA-3 FATTY ACIDS CAP 1000 MG 1000 MG: 1000 CAP at 08:38

## 2024-03-11 RX ADMIN — Medication 1 TABLET: at 08:32

## 2024-03-11 RX ADMIN — RIVAROXABAN 2.5 MG: 2.5 TABLET, FILM COATED ORAL at 08:38

## 2024-03-11 RX ADMIN — METHOCARBAMOL 250 MG: 500 TABLET ORAL at 14:26

## 2024-03-11 RX ADMIN — SENNOSIDES, DOCUSATE SODIUM 1 TABLET: 8.6; 5 TABLET ORAL at 08:31

## 2024-03-11 RX ADMIN — ACETAMINOPHEN 975 MG: 325 TABLET, FILM COATED ORAL at 14:26

## 2024-03-11 RX ADMIN — BUPROPION HYDROCHLORIDE 300 MG: 150 TABLET, FILM COATED, EXTENDED RELEASE ORAL at 08:37

## 2024-03-11 RX ADMIN — PREGABALIN 200 MG: 100 CAPSULE ORAL at 22:00

## 2024-03-11 RX ADMIN — FLUTICASONE FUROATE AND VILANTEROL TRIFENATATE 1 PUFF: 200; 25 POWDER RESPIRATORY (INHALATION) at 08:38

## 2024-03-11 RX ADMIN — METFORMIN HYDROCHLORIDE 500 MG: 500 TABLET, FILM COATED ORAL at 08:32

## 2024-03-11 RX ADMIN — CEFPODOXIME PROXETIL 400 MG: 200 TABLET, FILM COATED ORAL at 08:37

## 2024-03-11 RX ADMIN — ATORVASTATIN CALCIUM 10 MG: 10 TABLET, FILM COATED ORAL at 17:08

## 2024-03-11 RX ADMIN — INSULIN LISPRO 1 UNITS: 100 INJECTION, SOLUTION INTRAVENOUS; SUBCUTANEOUS at 08:31

## 2024-03-11 NOTE — CASE MANAGEMENT
Cm received order from therapy for a lightweight 16 x16 w/c, order placed with [employer] via parachute to be delivered to pts room prior to dc.

## 2024-03-11 NOTE — PROGRESS NOTES
[facility]  Progress Note  Name: Lona Cedeno I  MRN: [id number]  Unit/Bed#: -01 I Date of Admission: 3/5/2024   Date of Service: 3/11/2024 I Hospital Day: 6    Assessment/Plan   * Surgical site infection  Assessment & Plan  S/p right axilla/femoral bypass 1/31/2024  Postop infection s/p washout/debridement 2/14 and 2/19  VAC removed 2/28/2024  Continue oral antibiotics through 3/22/2024  Cultures positive Klebsiella/Proteus  Continue local dressing changes  Will consult vascular surgery if needed for local care  Refer to media    Diabetic foot ulcer with osteomyelitis (HCC)  Assessment & Plan  Lab Results   Component Value Date    HGBA1C 6.0 (H) 02/28/2024       Recent Labs     03/10/24  1552 03/10/24  2044 03/11/24  0558 03/11/24  1039   POCGLU 88 173* 158* 152*         Blood Sugar Average: Last 72 hrs:  (P) [id number]  S/p medial ulcer debridement/partial second toe amputation 2/4/2024  Recent STSG on 2/28/2024 with VAC removal 3/4/2024  Continue local care  NWB  podiatry following for management  PMR managing therapy/pain      Tobacco use disorder  Assessment & Plan  Continue nicotine patch  Recommend cessation    PAD (peripheral artery disease) ([facility])  Assessment & Plan  S/p right axillo-femoral bypass 1/31/2024  Continue aspirin/statin/low-dose Xarelto    Essential hypertension  Assessment & Plan  Hydralazine as needed.  Was taking Lisinopril 5mg daily and amiloride 5mg daily prior to admission.  stable    Type 2 diabetes mellitus with diabetic polyneuropathy (HCC)  Assessment & Plan  Lab Results   Component Value Date    HGBA1C 6.0 (H) 02/28/2024       Recent Labs     03/10/24  1552 03/10/24  2044 03/11/24  0558 03/11/24  1039   POCGLU 88 173* 158* 152*         Blood Sugar Average: Last 72 hrs:  (P) 135.6672811877909593  Home: Takes metformin 1000 mg twice daily/Januvia 100 mg at lunchtime  Here: Sliding scale/DM diet/metformin 500mg bid  Continue  metformin as above and move towards getting back to oral meds  No changes today.             The above assessment and plan was reviewed and updated as determined by my evaluation of the patient on 3/11/2024.    Labs:   Results from last 7 days   Lab Units 03/11/24  0559   WBC Thousand/uL 7.23   HEMOGLOBIN g/dL 12.1   HEMATOCRIT % 39.0   PLATELETS Thousands/uL 318     Results from last 7 days   Lab Units 03/11/24  0559   SODIUM mmol/L 141   POTASSIUM mmol/L 4.0   CHLORIDE mmol/L 102   CO2 mmol/L 30   BUN mg/dL 17   CREATININE mg/dL 0.62   CALCIUM mg/dL 9.8             Results from last 7 days   Lab Units 03/11/24  1039 03/11/24  0558 03/10/24  2044   POC GLUCOSE mg/dl 152* 158* 173*       Imaging  No orders to display       Review of Scheduled Meds:  Current Facility-Administered Medications   Medication Dose Route Frequency Provider Last Rate    acetaminophen  975 mg Oral Q8H JILLIAN Vanesa Sheehan, CRNP      albuterol  2 puff Inhalation Q6H PRN Vanesa Sheehan, CRNP      aspirin  81 mg Oral Daily Vanesa Sheehan, CRNP      buPROPion  300 mg Oral Daily Vanesa Sheehan, CRNP      cefpodoxime  400 mg Oral BID With Meals Vanesa Sheehan, CRNP      clonazePAM  1 mg Oral QPM Vanesa Sheehan, CRNP      cyanocobalamin  1,000 mcg Oral Daily Vanesa Sheehan, CRNP      fish oil  1,000 mg Oral Daily Vanesa Sheehan, CRNP      fluticasone-vilanterol  1 puff Inhalation Daily Vanesa Sheehan, CRNP      hydrALAZINE  25 mg Oral Q8H PRN Vanesa Sheehan, CRNP      insulin lispro  1-5 Units Subcutaneous TID AC Vanesa Sheehan, CRNP      insulin lispro  1-5 Units Subcutaneous HS Vanesa Sheehan, CRNP      melatonin  6 mg Oral HS PRN Alan England MD      metFORMIN  500 mg Oral BID With Meals Vanesa Sheehan, CRNP      methocarbamol  250 mg Oral Q8H JILLIAN Vanesa Sheehan, CRNP      multivitamin-minerals  1 tablet Oral Daily Vanesa Sheehan, CRNP      nicotine  1 patch Transdermal Daily Vanesa Sheehan, CRNP      oxyCODONE  2.5 mg Oral Q4H PRN Vanesa Sheehan, CRNP      Or    oxyCODONE  5 mg  Oral Q4H PRN Vanesa Sheehan, CRNP      pantoprazole  40 mg Oral Early Morning Vanesa Sheehan, CRNP      polyethylene glycol  17 g Oral Daily Vanesa Sheehan, CRNP      pregabalin  200 mg Oral TID Vanesa Sheehan, CRNP      rivaroxaban  2.5 mg Oral BID Vanesa Sheehan, CRNP      senna-docusate sodium  1 tablet Oral BID Vanesa Sheehan, CRNP         Subjective/ HPI: Patient seen and examined. Patients overnight issues or events were reviewed with nursing staff. New or overnight issues include the following:     Pt seen in her room. She is requesting that Prevagen be added. Her daughter brought it from home. Reviewed blood work with pt. She denies any current complaints.    ROS:   A 10 point ROS was performed; negative except as noted above.        *Labs /Radiology studies Reviewed  *Medications  reviewed and reconciled as needed  *Please refer to order section for additional ordered labs studies      Physical Examination:  Vitals:   Vitals:    03/10/24 0546 03/10/24 1338 03/10/24 2003 03/11/24 0500   BP: 149/63 111/53 133/63 115/58   BP Location: Left arm Right arm Right arm Left arm   Pulse: 69 70 77 74   Resp: 16 20 16 17   Temp: 97.6 °F (36.4 °C) 97.9 °F (36.6 °C) 98 °F (36.7 °C) 97.5 °F (36.4 °C)   TempSrc: Oral Oral Oral Oral   SpO2: 90% 97% 94% 95%   Weight:           General Appearance: NAD; pleasant  HEENT: PERRLA, conjuctiva normal; mucous membranes moist; face symmetrical  Neck:  Supple  Lungs: clear bilaterally, normal respiratory effort, no retractions, expiratory effort normal, on room air  CV: regular rate and rhythm, no murmurs rubs or gallops noted   ABD: soft non tender, +BS x4  EXT: no edema, Rt foot in boot  Skin: normal turgor, normal texture, no rash  Psych: affect normal, mood normal  Neuro: AAOx3       The above physical exam was reviewed and updated as determined by my evaluation of the patient on 3/11/2024.    Invasive Devices       Drain  Duration             Ureteral Internal Stent Left ureter 6 Fr. 112  days                       VTE Pharmacologic Prophylaxis: Xarelto  Code Status: Level 3 - DNAR and DNI  Current Length of Stay: 6 day(s)    Total floor / unit time spent today 30 minutes  Coordination of patient's care was performed in conjunction with primary service. Time invested included review of patient's labs, vitals, and management of their comorbidities with continued monitoring, examination of patient as well as answering patient questions, documenting her findings and creating progress note in electronic medical record,  ordering appropriate diagnostic testing.       ** Please Note:  voice to text software may have been used in the creation of this document. Although proof errors in transcription or interpretation are a potential of such software**

## 2024-03-11 NOTE — PROGRESS NOTES
Physical Medicine and Rehabilitation Progress Note  Lona Cedeno 78 y.o. female MRN: [id number]  Unit/Bed#: [facility] 974-01 Encounter: [id number]    To Review: Lona Cedeno is a 78 y.o. female with history of anxiety, closed fracture of the coccyx in May 2023, diabetes, GERD, hyperlipidemia, hypertension, IBS who presented to the [facility] on 1/23 from the podiatry office with a diabetic ulcer of the right toe and right ankle with exposed Achilles tendon.  She was stabbed have osteomyelitis of the right foot transferred to [facility] to reconsider vascularization and surgical options.  She is recommended to undergo an extra-anatomic bypass.  There was discussion about right transtibial amputation however decision was made for attempted limb salvage.  She underwent the above bypass on 1/31/2024 as well as on 2/4/2024 and underwent right toe and heel wound debridement with cycle and partial amputation of the toe and VAC placement.  Her course was complicated by arrest chest wall hematoma s/p evacuation on 2/4 with vascular.  On 2/13 the patient needed discharge back to acute care in the setting of fevers and leukocytosis from a potential infectious standpoint.  She was also given a discharge from the [facility] to acute care setting after podiatry wanted to take the patient back for split thickness skin grafting which occurred on 2/28 with Dr. Yeboah.  Patient was transition to oral antibiotics and wound vacs have been removed.  She remains nonweightbearing to the right lower extremity. The patient was evaluated by the Rehabilitation team and deemed an appropriate candidate for comprehensive inpatient rehabilitation and admitted to the [facility] on 3/5/2024  2:03 PM     Chief Complaint: Feeling fine    Interval History/Subjective:  No major events over the weekend. Denies any pain. Very eager to discharge in a few days. NO new CP, SOB, fevers, chills, N/V, abdominal pain. Last BM 3/11.      ROS:  A 10 point review of systems was negative except for what is noted in the HPI.    Today's Changes:  Continue PT/OT, discharge prep for later this week.  Continue local care for wounds.     Total visit time: 35 minutes, with more than 50% spent counseling/coordinating care. Counseling includes discussion with patient re: progress in therapies, functional issues observed by therapy staff, and discussion with patient regarding their current medical state and wellbeing. Coordination of patient's care was performed in conjunction with Internal Medicine service to monitor patient's labs, vitals, and management of their comorbidities.    Assessment/Plan:    * Surgical site infection  Assessment & Plan  Needed treatment with cefepime for surgical site infection however also on vancomycin which was discontinued on 2/16  Right groin breakdown status post wound washout with VAC placement on 2/14 as well as 2/19  Was followed by infectious disease and continued on cefepime with plan to transition to cefpodoxime 400 mg twice daily for 4 weeks.  Cultures were positive for Klebsiella and Proteus  VAC to the right groin as well as the foot have since been discontinued and continues for cefpodoxime for antibiotic treatment  Maintain nonweightbearing to the right lower extremity  Underwent split thickness skin grafting with podiatry on 2/28 for the right foot  Due to the surgical site infection, sepsis and requirement for multiple trips to the OR for wound debridement, VAC placement, split-thickness grafting patient is not at her functional baseline and would benefit from daily physician oversight and rehabilitation nursing as well as aggressive physical and occupational therapy under guidance of physical medicine and rehabilitation specialist.  Patient will tolerate this 3 hours/day 5 to 7 days/week with ultimate discharge goal for the community    Impulsive  Assessment & Plan  Had impulsive behaviors during prior  admissions and discussed overall behavioral plan.  Patient on last admission was impulsive and not using call bell appropriately as well as not following weightbearing restrictions  Discussed with patient again however continued vigilance with nursing and therapy checks, alarms and relatively sensitive settings and would benefit from Q 2-hour bowel bladder checks    Sacral wound  Assessment & Plan  Unstageable wound on the sacrum on initial evaluations, POA  Consult wound care for management  Vigilant turns in bed and weight shifts, daily sacral checks by nursing    Impaired mobility and activities of daily living  Assessment & Plan  - Rehabilitation medicine physician for daily monitoring of care, 24 hour availability for acute  medical issues, medication management, and therapeutic and diagnostic assessments.  - 24 hour rehabilitation nursing 7 days per week for: management/teaching of medications,  bowel/bladder routine, skin care.  - PT, OT for 2-3 hours per day, 5 to 6 days per week; 15 hours per week  - Rehabilitation Psychology for adjustment and coping  - MSW for barriers to discharge, community resources, and family support  - Discharge planning following to help ensure a safe and efficient discharge    Diabetic foot ulcer with osteomyelitis (HCC)  Assessment & Plan  Patient presented with right posterior ankle ulceration with exposed Achilles tendon and required foot debridement on 2/4/2024 with VAC placement with right medial ankle ulcer, right second toe osteomyelitis status post right second toe amputation  Wound vacs have since been discontinued and continues to be followed by podiatry  Require going back to the OR on 2/28 for split-thickness grafting  Remains nonweightbearing to the right lower extremity      Acute pain due to injury  Assessment & Plan  Acetaminophen 975 mg every 8 hours  Lyrica 200 mg 3 times daily  Robaxin 250 mg every 8 hours  Oxycodone 2.5-5 mg every 4 hours as  needed    Anemia  Assessment & Plan  Most recent hemoglobin 12.1 which is just within the normal range but has been overall improving  Continue to follow biweekly labs or sooner if clinically indicated as patient is overall at risk for drop in hemoglobin  On review of the last 2 weeks of labs has improved slightly from the mid to upper 10s  Continue B12 supplementation    Tobacco use disorder  Assessment & Plan  Smoking cessation counseling  Had been on nicotine patch 21 mg / 24-hour, however decreased to 14 mg    COPD, severity to be determined ([facility])  Assessment & Plan  Continue inhalers per internal medicine including albuterol as needed as well as fluticasone-vilanterol 1 puff daily  Monitor oxygen saturations and therapy    PAD (peripheral artery disease) ([facility])  Assessment & Plan  Patient was on aspirin, statin and low-dose Xarelto 2.5 mg twice daily  Status post 1/31 bypass axillary-femoral, right with 8 mm ringed PTFE graft    GERD without esophagitis  Assessment & Plan  Continue Protonix    Anxiety and depression  Assessment & Plan  Continue Wellbutrin  mg daily as well as Klonopin  Supportive counseling and consider neuropsychology if indicated and patient agreeable    Essential hypertension  Assessment & Plan  Monitor pressures in therapy however has been off of regular scheduled antihypertensives  Ordered hydralazine as needed for systolic blood pressure greater than 160    Type 2 diabetes mellitus with diabetic polyneuropathy ([facility])  Assessment & Plan  Lab Results   Component Value Date    HGBA1C 6.0 (H) 02/28/2024       Recent Labs     03/10/24  2044 03/11/24  0558 03/11/24  1039 03/11/24  1604   POCGLU 173* 158* 152* 99     Here: Metformin 500mg BID and CDI/Accuchecks  Monitor per internal medicine and make adjustments as needed  Glycemic control has been good we will need to continue to for appropriate wound healing potential        Health Maintenance  #Delirium/Sleep: At risk. Optimize  sleep/wake, bowel, bladder, and pain management.  #Bowel: Last BM 3/11, miralax daily, Senokot-S 1 tablet BID  #Bladder: Voiding and continent  #Skin/Pressure Injury Prevention: Turn Q2hr in bed, with weight shifts [id number] in wheelchair.  #DVT Prophylaxis:  Xarelto ppx dose, SCDs  #GI Prophylaxis: Not indicated  #Code Status: DNR/DNI  #FEN: Diabetic diet, [place] with Lunch/Dinner  #Dispo: ADD 3/14 with Home PT/OT/RN.      Objective:    Functional Update:  PT: Ind bed mobility, CGA-Sup transfers, Set-up wheelchair mobility 300'  OT: Sup ADLs    Allergies per EMR    Physical Exam:  Temp:  [97.5 °F (36.4 °C)-98 °F (36.7 °C)] 97.5 °F (36.4 °C)  HR:  [70-77] 74  Resp:  [16-20] 17  BP: (111-133)/(53-63) 115/58  Oxygen Therapy  SpO2: 95 %    Gen: No acute distress, Well-nourished, well-appearing.  HEENT: Moist mucus membranes  Cardiovascular: Regular rate, rhythm, S1/S2. Distal pulses palpable  Heme/Extr: No edema  Pulmonary: Non-labored breathing  : No kraft  GI: Soft, non-tender, non-distended.   Integumentary: Skin is warm, dry.   Neuro: AAOx3, Speech is intact. Appropriate to questioning.   Psych: Normal mood and affect.     Diagnostic Studies: reviewed, no new imaging  No results found.    Laboratory:  Reviewed  Results from last 7 days   Lab Units 03/11/24  0559   HEMOGLOBIN g/dL 12.1   HEMATOCRIT % 39.0   WBC Thousand/uL 7.23     Results from last 7 days   Lab Units 03/11/24  0559   BUN mg/dL 17   POTASSIUM mmol/L 4.0   CHLORIDE mmol/L 102   CREATININE mg/dL 0.62            Patient Active Problem List   Diagnosis    Type 2 diabetes mellitus with diabetic polyneuropathy (HCC)    Essential hypertension    Anxiety and depression    GERD without esophagitis    Urinary incontinence    Degenerative lumbar disc    Lactic acidosis    Hypomagnesemia    Bladder neoplasm    Left renal mass    PAD (peripheral artery disease) (HCC)    Abnormal CT of the chest    COPD, severity to be determined (HCC)    Tobacco use  disorder    Age-related osteoporosis without current pathological fracture    Ankle ulcer, right, with fat layer exposed (HCC)    Diabetic foot ulcer with osteomyelitis (HCC)    Preoperative cardiovascular examination    Constipation    Hematoma    Anemia    At risk for venous thromboembolism (VTE)    At high risk for skin breakdown    Wound of skin    Impaired mobility and activities of daily living    Acute pain due to injury    Leukocytosis    Surgical site infection    Sepsis without acute organ dysfunction (HCC)    Other dietary vitamin B12 deficiency anemia    Sacral wound    Impulsive         Medications  Current Facility-Administered Medications   Medication Dose Route Frequency Provider Last Rate    acetaminophen  975 mg Oral Q8H JILLIAN Vanesa Sheehan, CRNP      albuterol  2 puff Inhalation Q6H PRN Vanesa Sheehan, CRNP      aspirin  81 mg Oral Daily Vanesa Sheehan, CRNP      buPROPion  300 mg Oral Daily Vanesa Sheehan, CRNP      cefpodoxime  400 mg Oral BID With Meals Vanesa Sheehan, CRNP      clonazePAM  1 mg Oral QPM Vanesa Sheehan, CRNP      cyanocobalamin  1,000 mcg Oral Daily Vanesa Sheehan, CRNP      fish oil  1,000 mg Oral Daily Vanesa Sheehan, CRNP      fluticasone-vilanterol  1 puff Inhalation Daily Vanesa Sheehan, CRNP      hydrALAZINE  25 mg Oral Q8H PRN Vanesa Sheehan, CRNP      insulin lispro  1-5 Units Subcutaneous TID AC Vanesa Sheehan, CRNP      insulin lispro  1-5 Units Subcutaneous HS Vanesa Sheehan, CRNP      melatonin  6 mg Oral HS PRN Alan England MD      metFORMIN  500 mg Oral BID With Meals Vanesa Sheehan, CRNP      methocarbamol  250 mg Oral Q8H JILLIAN Vanesa Sheehan, CRNP      multivitamin-minerals  1 tablet Oral Daily Vanesa Sheehan, CRNP      nicotine  1 patch Transdermal Daily Vanesa Sheehan, CRNP      oxyCODONE  2.5 mg Oral Q4H PRN Vanesa Sheehan, CRNP      Or    oxyCODONE  5 mg Oral Q4H PRN Vanesa Sheehan, CRNP      pantoprazole  40 mg Oral Early Morning Vanesa Sheehan, CRNP      polyethylene glycol  17 g Oral Daily  Vanesa Sheehan, CRNP      pregabalin  200 mg Oral TID Vanesa Sheehan, CRNP      rivaroxaban  2.5 mg Oral BID Vanesa Sheehan, CRNP      senna-docusate sodium  1 tablet Oral BID Vanesa Sheehan, CRNP            ** Please Note: Fluency Direct voice to text software may have been used in the creation of this document. **

## 2024-03-11 NOTE — PROGRESS NOTES
02/27/24 0900   Pain Assessment   Pain Assessment Tool 0-10   Pain Score No Pain   Restrictions/Precautions   Precautions Bed/chair alarms;Cognitive;Fall Risk;Hard of hearing;Impulsive;Contact/isolation;Multiple lines;Pressure Ulcer;Supervision on toilet/commode   RLE Weight Bearing Per Order NWB   Braces or Orthoses   (R prevalon boot, R wound vac x2)   Subjective   Subjective Pt agreeable to perform skilled PT   Roll Left and Right   Type of Assistance Needed Supervision   Roll Left and Right CARE Score 4   Sit to Lying   Type of Assistance Needed Supervision   Sit to Lying CARE Score 4   Lying to Sitting on Side of Bed   Type of Assistance Needed Supervision   Lying to Sitting on Side of Bed CARE Score 4   Sit to Stand   Type of Assistance Needed Physical assistance;Verbal cues;Adaptive equipment   Physical Assistance Level Total assistance   Sit to Stand CARE Score 1   Bed-Chair Transfer   Type of Assistance Needed Physical assistance   Physical Assistance Level 26%-50%   Comment sit piv to L   Chair/Bed-to-Chair Transfer CARE Score 3   Transfer Bed/Chair/Wheelchair   Adaptive Equipment None   Walk 10 Feet   Reason if not Attempted Safety concerns   Walk 10 Feet CARE Score 88   Walk 50 Feet with Two Turns   Reason if not Attempted Safety concerns   Walk 50 Feet with Two Turns CARE Score 88   Walk 150 Feet   Reason if not Attempted Safety concerns   Walk 150 Feet CARE Score 88   Ambulation   Primary Mode of Locomotion Prior to Admission Walk   Does the patient walk? 2. Yes   Assessment   Treatment Assessment pt focus on 90 min skilled PT for WC propl and TE for LE with core strengthening and seated ex and supine ex ;s to her tolerance and overall bed mobility with safety percaution and better outcome   PT Therapy Minutes   PT Time In 0900   PT Time Out 1030   PT Total Time (minutes) 90   PT Mode of treatment - Individual (minutes) 90   PT Mode of treatment - Concurrent (minutes) 0   PT Mode of treatment -  Group (minutes) 0   PT Mode of treatment - Co-treat (minutes) 0   PT Mode of Treatment - Total time(minutes) 90 minutes   PT Cumulative Minutes 270   Therapy Time missed   Time missed? No

## 2024-03-11 NOTE — DISCHARGE SUMMARY
Discharge Summary   Lona Cedeno 78 y.o. female MRN: [id number]  Unit/Bed#: [facility] 974-01 Encounter: [id number]  Admission Date: 3/5/2024     Discharge Date: 3/14/2024    Discharging Provider: Denisse Berger    PCP:  [phone]    HPI: See records from acute hospitalization for detailed records.     Summary of [facility] Course: Lona Cedeno is a 79yo female, with HTN, DM, HLD, obesity, GERD, COPD, neuropathy, tobacco abuse, anxiety/depression, PAD s/p right axillo-femoral bypass on 1/31/2024, bladder CA, anemia, B12 deficiency, who was admitted to the [facility] 3/5/24 after undergoing a split thickness skin graft to the Rt ankle. She underwent an intensive rehabilitation program. She is to remain on Vantin through 3/22/24. Pt was placed on xarelto low dose 2.5mg bid by vascular surgery and will have follow up with them post discharge as well.     Problem List/Comorbidities:    * Surgical site infection  Assessment & Plan  S/p right axilla/femoral bypass 1/31/2024  Postop infection s/p washout/debridement 2/14 and 2/19  VAC removed 2/28/2024  Continue oral antibiotics through 3/22/2024  Cultures positive Klebsiella/Proteus  Continue local dressing changes  Continue xarelto  Refer to media  Outpt Vascular Surgery follow-up scheduled.    Diabetic foot ulcer with osteomyelitis (HCC)  Assessment & Plan  Lab Results   Component Value Date    HGBA1C 6.0 (H) 02/28/2024       Recent Labs     03/13/24  1144 03/13/24  1603 03/13/24  2146 03/14/24  0623   POCGLU 166* 91 93 134         Blood Sugar Average: Last 72 hrs:  (P) 123.4998524481196801  S/p medial ulcer debridement/partial second toe amputation 2/4/2024  Recent STSG on 2/28/2024 with VAC removal 3/4/2024  Continue local care; refer to images  NWB  Outpt Podiatry follow-up    Tobacco use disorder  Assessment & Plan  Continue nicotine patch  Recommend cessation    PAD (peripheral artery disease) (HCC)  Assessment & Plan  S/p right axillo-femoral bypass 1/31/2024  Continue  aspirin/statin/low-dose Xarelto    Essential hypertension  Assessment & Plan  Was taking Lisinopril 5mg daily and amiloride 5mg daily prior to admission.  Added back lisinopril for renal protection.  Hydralazine as needed is ordered however has not required any doses    Type 2 diabetes mellitus with diabetic polyneuropathy (HCC)  Assessment & Plan  Lab Results   Component Value Date    HGBA1C 6.0 (H) 02/28/2024       Recent Labs     03/13/24  1144 03/13/24  1603 03/13/24  2146 03/14/24  0623   POCGLU 166* 91 93 134         Blood Sugar Average: Last 72 hrs:  (P) 123.5156991196012369  Home: Takes metformin 1000 mg twice daily/Januvia 100 mg at lunchtime  Here: metformin 500mg bid  Will dc home on metformin 1g bid and test BS  Resume januvia if BS remain >150        Discharge Physical Examination:  /56   Pulse 72   Temp 98.2 °F (36.8 °C) (Oral)   Resp 18   Wt 69 kg (152 lb 1.9 oz)   SpO2 93%   BMI 28.74 kg/m²     General Appearance: no distress, conversive  HEENT: PERRLA, conjuctiva normal; oropharynx clear; mucous membranes moist;   Neck:  Supple, no lymphadenopathy or thyromegaly  Lungs: CTA, normal respiratory effort, no retractions, expiratory effort normal  CV: regular rate and rhythm , PMI normal   ABD: soft non tender, no masses , no hepatic or splenomegaly  EXT: Rt LE boot  Skin: normal turgor, normal texture, no rash  Psych: affect normal, mood normal  Neuro: AAOx3      Significant Findings, Care, Treatment and Services Provided: Acute comprehensive interdisciplinary inpatient rehabilitation including PT, OT, SLP, RN, CM, SW, dietary, psychology, etc.      Physiatry Additions/Comorbidities:    * Surgical site infection  Assessment & Plan  Outpatient f/u with Podiatry  Completed comprehensive acute inpatient rehab.  Discharging with Home PT/OT/RN     Impulsive  Assessment & Plan  Had impulsive behaviors during prior admissions and discussed overall behavioral plan.  Patient on last admission was  impulsive and not using call bell appropriately as well as not following weightbearing restrictions  Discussed with patient again however continued vigilance with nursing and therapy checks, alarms and relatively sensitive settings and would benefit from Q 2-hour bowel bladder checks  No major issues during this stay at the [facility].     Sacral wound  Assessment & Plan  Unstageable wound on the sacrum on initial evaluations, POA  Consult wound care for management  Has gotten smaller/improved during this stay  Still unstageable  Will need outpatient home nursing for wound care, and also will need outpatient follow-up with wound care center to be arranged by wound care team.     Acute pain due to injury  Assessment & Plan  Acetaminophen 975 mg every 8 hours  Lyrica 200 mg 3 times daily  Robaxin 250 mg every 8 hours - would make PRN at discharge  Oxycodone 2.5-5 mg every 4 hours as needed - has not been using the past week.    #Delirium/Sleep: At risk. Optimize sleep/wake, bowel, bladder, and pain management.  #Bowel: Last BM 3/12, miralax daily, Senokot-S 1 tablet BID - can wean off after discharge.   #Bladder: Voiding and continent    Acute Rehabilitation Center Course: Patient participated in a comprehensive interdisciplinary inpatient rehabilitation program which included involvment of Rehab MD, therapies (PT, OT, and/or SLP), RN, CM, SW, dietary, and psychology services.     Etiologic/Rehabilitation Diagnosis: Impairment of mobility, safety and Activities of Daily Living (ADLs) due to Other Disabling Impairments:  13  Other Disabling Impairments      PRIOR LEVEL OF FUNCTION:  She lives in a(n) single family home  Lona Cedeno is  and lives with their family.  Self Care: Independent, Indoor Mobility: Independent, Stairs (in/outdoor): Independent, and Cognition: Independent  The living area: can live on one level  There are 5 steps to enter the home.    The patient will not have 24 hour supervision/physical  assistance available upon discharge    Functional Status Upon Admission to [facility]:  Physical Therapy: Supervision for bed mobility min assist for transfers and moderate assist for 6 feet with rolling walker  Occupational Therapy: Supervision for grooming upper body ADLs sometimes min assist for upper body dressing and requiring min to max for lower body ADLs and toileting     Functional Status Upon Discharge from [facility]:   PT: Ind bed mobility, Margaux ambulation 10' with RW, Ind wheelchair mobility, Margaux ramp  OT: Ind eating, Sup grooming, bathing, UB dressing, LB dressing, Sup toileting, Sup toilet tranfer    Condition at Discharge: good     Rehab Physician Signature:   Ashley de Padua, MD  Physical Medicine and Rehabilitation    Discharge instructions/Information to patient and family:   See after visit summary for information provided to patient and family.      Provisions for Follow-Up Care:  See after visit summary for information related to follow-up care and any pertinent home health orders.      Future Appointments   Date Time Provider Department Center   3/15/2024 To Be Determined Cathleen Ovalle RN [facility]   3/22/2024 11:30 AM SABI[facility]   4/3/2024  9:20 AM DO [facility]           Disposition: Home with Home PT/OT/RN    Planned Readmission: No    Discharge Statement   I spent 60 minutes or more discharging the patient.  I had direct contact with the patient on the day of discharge. Greater than 50% of the total time was spent examining patient, answering all patient questions, arranging and discussing plan of care with patient and care team as well as directly providing post-discharge instructions. Additional time then spent on discharge activities.    Discharge Medications:  See after visit summary for reconciled discharge medications provided to patient and family.

## 2024-03-11 NOTE — PROGRESS NOTES
03/11/24 0700   Pain Assessment   Pain Assessment Tool 0-10   Pain Score No Pain   Restrictions/Precautions   Precautions Bed/chair alarms;Cognitive;Fall Risk;Impulsive;Contact/isolation;Pressure Ulcer;Supervision on toilet/commode   Weight Bearing Restrictions Yes   RLE Weight Bearing Per Order NWB   ROM Restrictions No   Braces or Orthoses Other (Comment)  (R prevalon boot on at all times)   Eating   Type of Assistance Needed Independent   Physical Assistance Level No physical assistance   Comment seated   Eating CARE Score 6   Oral Hygiene   Type of Assistance Needed Independent   Physical Assistance Level No physical assistance   Comment seated in w/c at sink   Oral Hygiene CARE Score 6   Shower/Bathe Self   Type of Assistance Needed Supervision;Verbal cues   Physical Assistance Level No physical assistance   Comment sponge bathing routine completed seated EOB to simulate home set up. pt able to bathe all parts except for RLE which is ace wrapped at this time. pt able to bathe az/rear via weight shifting side to side however does require max VCs for ensuring adherence to RLE NWBing.   Shower/Bathe Self CARE Score 4   Upper Body Dressing   Type of Assistance Needed Set-up / clean-up   Physical Assistance Level No physical assistance   Comment seated   Upper Body Dressing CARE Score 5   Lower Body Dressing   Type of Assistance Needed Supervision;Verbal cues   Physical Assistance Level No physical assistance   Comment seated EOB to thread underwear/pants over feet, weight shifts side to side for CM; max VCs for adherence to RLE NWBing during CM   Lower Body Dressing CARE Score 4   Putting On/Taking Off Footwear   Type of Assistance Needed Physical assistance   Physical Assistance Level 25% or less   Comment set up to don L Sock/shoe; A to don/doff R prevalon boot   Putting On/Taking Off Footwear CARE Score 3   Bed-Chair Transfer   Type of Assistance Needed Supervision;Verbal cues   Physical Assistance Level No  physical assistance   Comment sit pivot bed, mat table and w/c. pt demo's good recall of setting self up, managing RLE leg rest, managing breaks. does require 1 VC for managing arm rest.   Chair/Bed-to-Chair Transfer CARE Score 4   Kitchen Mobility   Kitchen-Mobility Level Wheelchair   Kitchen Activity Retrieve items;Transport items   Kitchen Mobility Comments pt engages in basic kitchen mobility via w/c level focusing on item retrieval from lower cabinets and fridge. it is not recommended at this time that pt retrieve items from over head cabinets or shelves higher in fridge in order to maintain safety and adhere to RLE NWBing. min VCs provided to ensure pt locks w/c brakes when performing func reaching into lower cabinets/fridge.   Exercise Tools   Exercise Tools Yes   Other Exercise Tool 1 pt engages in UE strengthening using table top UBE, on mild resistance for 5min forward and 5m in backward, with 1 rest break in between to manage fatigue. good tolerance to exercise with focus on increasing UE Strengthening for ADLs/transfers as pt remains NWBing on RLE.   Cognition   Overall Cognitive Status Impaired   Arousal/Participation Alert;Cooperative   Attention Attends with cues to redirect   Orientation Level Oriented X4   Memory Decreased short term memory   Following Commands Follows one step commands without difficulty   Activity Tolerance   Activity Tolerance Patient tolerated treatment well   Assessment   Treatment Assessment pt engages in 90 minute skilled OT Session focusing on ADL retraining, sit pivots to various surfaces and heights, UE Strengthening and kitchen mobility at w/c level. see above for full func details. pt completes basic ADL at supervision level, max VCs for adhering RLE NWBing especially during LE self care tasks. supervision for sit pivot transfers with good recall and carryover of w/c positioning and ability to managing leg rest, breaks with 1 VC for managing arm rest. pt reports having  support from spouse and daughter for all IADL tasks and so continue recommendation of having daughter assist with more complex meal prep of stove top cooking, oven cooking with pt able to retrieve items from lower cabinets and fridge, requiring assistance for retrieving items from higher cabinets and fridge due to recommendation of strictly w/c level only, no standing at this time. pt remains highly limited by WBing status, decreased carryover of adherence to RLE NWBing during self care/toileting tasks, impaired func cog/safety, warranting continued skilled care to focus on ADL retraining to simulate home set up, sit pivots, w/c management/propulsion, light IADLs from w/c level, in order to decrease burden of care at d/c.   Prognosis Fair   Problem List Decreased strength;Decreased endurance;Impaired balance;Decreased mobility;Decreased cognition;Impaired judgement;Decreased safety awareness;Impaired hearing;Decreased skin integrity;Orthopedic restrictions   Plan   Treatment/Interventions ADL retraining;Functional transfer training;Therapeutic exercise;Endurance training;Cognitive reorientation;Patient/family training;Equipment eval/education;Compensatory technique education   OT Therapy Minutes   OT Time In 0700   OT Time Out 0830   OT Total Time (minutes) 90   OT Mode of treatment - Individual (minutes) 90   OT Mode of treatment - Concurrent (minutes) 0   OT Mode of treatment - Group (minutes) 0   OT Mode of treatment - Co-treat (minutes) 0   OT Mode of Treatment - Total time(minutes) 90 minutes   OT Cumulative Minutes 390   Therapy Time missed   Time missed? No

## 2024-03-11 NOTE — PROGRESS NOTES
03/11/24 1030   Pain Assessment   Pain Score No Pain   Restrictions/Precautions   Precautions Bed/chair alarms;Cognitive;Fall Risk;Impulsive;Contact/isolation;Pressure Ulcer;Supervision on toilet/commode   Weight Bearing Restrictions Yes   RLE Weight Bearing Per Order NWB   ROM Restrictions No   Braces or Orthoses Other (Comment)  (R prevalon boot at all times)   Cognition   Overall Cognitive Status Impaired   Arousal/Participation Alert;Cooperative   Attention Attends with cues to redirect   Orientation Level Oriented X4   Memory Decreased short term memory   Following Commands Follows one step commands without difficulty   Roll Left and Right   Type of Assistance Needed Independent   Roll Left and Right CARE Score 6   Sit to Lying   Type of Assistance Needed Independent   Sit to Lying CARE Score 6   Lying to Sitting on Side of Bed   Type of Assistance Needed Independent   Lying to Sitting on Side of Bed CARE Score 6   Bed-Chair Transfer   Type of Assistance Needed Supervision;Verbal cues   Comment modified stand/sit pivot EOB>WC, WC<>EOM, WC<>NuStep, allow increased time for pt to problem solve, able to self manage set up of transfer and remove/replace R leg rest, one incident did require VCs to ensure brakes were locked   Chair/Bed-to-Chair Transfer CARE Score 4   Car Transfer   Type of Assistance Needed Incidental touching   Comment CGA for modified stand/sit pivot transfer WC<> car in PT gym, hastily performed transfer to the car require CGA from therapist, VCs to slow down   Car Transfer CARE Score 4   Wheel 50 Feet with Two Turns   Type of Assistance Needed Set-up / clean-up   Wheel 50 Feet with Two Turns CARE Score 5   Wheel 150 Feet   Type of Assistance Needed Set-up / clean-up   Wheel 150 Feet CARE Score 5   Wheelchair mobility   Does the patient use a wheelchair? 1. Yes   Type of Wheelchair Used 1. Manual   Method Left upper extremity;Right upper extremity;Left lower extremity   Distance Level Surface  (feet) 300 ft  (115 ft x2)   Distance Wheeled 3% Grade 30 ft  (ramp on first floor)   Findings Required distant supervision for WC mobility on flat surfaces. Required intermittent rest breaks due to fatigue at times. For the ramp management required Min/CGA level of assistance, especially when descending the ramp. VCs to control speed during descent. Pt confirms daughter, Ariela, will be able to assist her on ramp management when she discharges home.   Curb or Single Stair   Reason if not Attempted Safety concerns   1 Step (Curb) CARE Score 88   4 Steps   Reason if not Attempted Safety concerns   4 Steps CARE Score 88   12 Steps   Reason if not Attempted Safety concerns   12 Steps CARE Score 88   Picking Up Object   Comment not recommending pt perform from standing position due to impaired balance and NWB on RLE   Reason if not Attempted Safety concerns   Picking Up Object CARE Score 88   Therapeutic Interventions   Strengthening supine SAQ 3# LLE 3x10, 0# RLE 3x10, supine SLR 3x10 each LE, modified bridge 3x10   Equipment Use   NuStep Level 3 x 10 minutes with BUEs and LLE only   Assessment   Treatment Assessment Patient participated in 90 minute skilled physical therapy session focusing on transfer training, WC mobility, and supine therex. Confirmed with patient that ramp is being installed prior to DC. States it is being installed today. With increased time, pt was able to set up environment properly for modified stand/sit pivot transfers. Pt declined practicing SPT with RW as she states she does not plan on using it and home and that RW scares her. Patient to be assessed for possible in room privileges tomorrow and will discuss with OT. Physical therapy to continue to focus on transfer training, short distance gait training with RW, WC mobility over various surfaces and ramp, and LE strengthening.   Problem List Decreased strength;Decreased endurance;Impaired balance;Decreased mobility;Decreased  cognition;Impaired judgement;Decreased safety awareness;Impaired hearing;Decreased skin integrity;Orthopedic restrictions   Barriers to Discharge Inaccessible home environment;Decreased caregiver support   PT Barriers   Functional Limitation Car transfers;Standing;Transfers;Walking;Stair negotiation;Wheelchair management   Plan   Treatment/Interventions Functional transfer training;LE strengthening/ROM;Elevations;Therapeutic exercise;Endurance training;Patient/family training;Equipment eval/education;Bed mobility;Gait training   Progress Progressing toward goals   Discharge Recommendation   Rehab Resource Intensity Level, PT   (TBD pending progress)   Equipment Recommended Wheelchair  (confirmed with patient she has RW at home.)   PT Therapy Minutes   PT Time In 1030   PT Time Out 1200   PT Total Time (minutes) 90   PT Mode of treatment - Individual (minutes) 90   PT Mode of treatment - Concurrent (minutes) 0   PT Mode of treatment - Group (minutes) 0   PT Mode of treatment - Co-treat (minutes) 0   PT Mode of Treatment - Total time(minutes) 90 minutes   PT Cumulative Minutes 510   Therapy Time missed   Time missed? No     Elenita Miller, PT, DPT

## 2024-03-11 NOTE — PROGRESS NOTES
Podiatry - Progress Note  Patient: Lona Cedeno 78 y.o. female   MRN: [id number]  PCP: Vivek Preston DO  Unit/Bed#: -01 Encounter: [id number]  Date Of Visit: 24    ASSESSMENT:    Lona Cedeno is a 78 y.o. female with:    Right posterior ankle ulcer with exposed achilles tendon  - Right wound debridement (DOS: 24)  - Right STSG application (DOS: 24)  Right medial ankle ulcer, limited to breakdown of skin  - Right STSG application (DOS: 24)   Osteomyelitis of right second toe  - Right 2nd toe amputation (DOS: 24)  T2DM  PAD  Tobacco use disorder  Diabetic polyneuropathy      PLAN:    Plan to continue local wound care consisting of Adaptic DSD to the right lower extremity split-thickness skin graft site.  No acute clinical signs of infection noted.  Patient planning to be discharged on Thursday 3/14, will plan to change dressing before discharge.  Patient stable for discharge from podiatry standpoint  Prevalon boot soiled, will place new order  Continue local wound care consisting of Maxorb DSD to donor site  Continue antibiotics per ID  Elevation and offloading on green foam wedges or pillows when non-ambulatory.  Rest of care per primary team.     Weightbearing status: Non-weightbearing right foot    SUBJECTIVE:     The patient was seen, evaluated, and assessed at bedside today. The patient was awake, alert, and in no acute distress. No acute events overnight. The patient reports no issues at this time. Patient denies N/V/F/chills/SOB/CP.      OBJECTIVE:     Vitals:   /58 (BP Location: Left arm)   Pulse 74   Temp 97.5 °F (36.4 °C) (Oral)   Resp 17   Wt 69.5 kg (153 lb 3.5 oz)   SpO2 95%   BMI 28.95 kg/m²     Temp (24hrs), Av.8 °F (36.6 °C), Min:97.5 °F (36.4 °C), Max:98 °F (36.7 °C)      Physical Exam:     Lungs: Non labored breathing  Abdomen: Soft, non-tender.  Lower Extremity:  Cardiovascular status at baseline from admission.  Neurological status at baseline  from admission.  Musculoskeletal status at baseline from admission. No calf tenderness noted.     Lower extremity wound(s) as noted below:    Wound #: 1  Location: right posterior ankle  Length 5.8cm: Width 3cm: Depth 0.1cm:   Deepest Tissue Noted in Base: subcutaneous  Probe to Bone: No  Peripheral Skin Description: Attached  Granulation: 90% Fibrotic Tissue: 10% Necrotic Tissue: 0%   Drainage Amount: minimal, serous  Signs of Infection: No  Staples remain intact, partial take of STSG     Wound #: 2  Location: right medial ankle  Length 2.4cm: Width 1cm: Depth 0.1cm:   Deepest Tissue Noted in Base: dermis  Probe to Bone: No  Peripheral Skin Description: Attached  Granulation: 80% Fibrotic Tissue: 20% Necrotic Tissue: 0%   Drainage Amount: minimal, serous  Signs of Infection: No  Staples removed, epithelization noted to wound base      Wound #: 3  Location: right STSG donor site on medial calf  Length 7cm: Width 4cm: Depth 0.1cm:   Deepest Tissue Noted in Base: dermis  Probe to Bone: No  Peripheral Skin Description: Attached  Granulation: 100% Fibrotic Tissue: 0% Necrotic Tissue: 0%   Drainage Amount: minimal, serous  Signs of Infection: No                     Additional Data:     Labs:    Results from last 7 days   Lab Units 03/11/24  0559   WBC Thousand/uL 7.23   HEMOGLOBIN g/dL 12.1   HEMATOCRIT % 39.0   PLATELETS Thousands/uL 318   NEUTROS PCT % 61   LYMPHS PCT % 22   MONOS PCT % 8   EOS PCT % 8*     Results from last 7 days   Lab Units 03/11/24  0559   POTASSIUM mmol/L 4.0   CHLORIDE mmol/L 102   CO2 mmol/L 30   BUN mg/dL 17   CREATININE mg/dL 0.62   CALCIUM mg/dL 9.8           * I Have Reviewed All Lab Data Listed Above.    Recent Cultures (last 7 days):               Imaging: I have personally reviewed pertinent films in PACS  EKG, Pathology, and Other Studies: I have personally reviewed pertinent reports.    ** Please Note: Portions of the record may have been created with voice recognition software.  "Occasional wrong word or \"sound a like\" substitutions may have occurred due to the inherent limitations of voice recognition software. Read the chart carefully and recognize, using context, where substitutions have occurred. **      "

## 2024-03-11 NOTE — DISCHARGE INSTR - AVS FIRST PAGE
Medical Discharge Instructions    Take Vantin 400mg 2x daily through 3/22/24.  Take Xarelto 2.5mg 2x daily.    DISCHARGE INSTRUCTIONS: [facility]IL[facility] [place]    Bring these instructions with you to your Outpatient Physician appointments so they can order and follow-up any additional lab work or imaging recommended at time of discharge.    If you (or your health care proxy) have any questions or concerns regarding your acute rehabilitation stay including issues with medications, rehabilitation, and follow-up plan, please call:          [facility] Unit in [place] at [phone] or [phone].    Should you develop fevers, chills, new weakness, changes in sensation, difficulty speaking, facial weakness, confusion, shortness of breath, chest pain, or other concerning symptoms please call 911 and/or obtain transportation to nearest ER immediately.    Should you develop worsening pain, swelling, or drainage notify your surgeon right away or obtain transportation to nearest ER for evaluation.    PHYSICIANS to see:  Please see your doctors listed in the follow up providers section of your discharge paperwork, and take the discharge paperwork with you to your appointments.    Home health has been ordered for you:  Your home health agency should reach out to you or your family soon to arrange follow-up.    (If [facility] home health is your provider their phone number is [phone])    If you are unable to get in touch with home health you may contact your  at [phone]. [place]    If you are unable to get in touch with home health you may contact your  at [phone]. [place]  If you are unable to get in touch with home health you may contact your  at [phone]. [place]    WOUND CARE INSTRUCTIONS to follow:  Home health nursing will assist you with wound management.  You may contact them with issues as well  once this service is set-up.    If [facility] health is your provider their phone number is [phone].    If you are unable to get in touch with [facility] you may contact your  at [phone].      WEIGHTBEARING/ACTIVITY PRECAUTIONS to follow:  Maintain NONWEIGHTBEARING in R leg/lower extremity    Driving restrictions:  You are recommended against driving until cleared by an outpatient physician.      MEDICAL MANAGEMENT AT HOME specific to you:  Acetaminophen (Tylenol) Dosing Warning:    You may have up to 3000 mg of acetaminophen (Tylenol) from all sources spread out over a 24 hours period.  Do not have more than that, as this can increase your risk of liver injury which can be serious.     Bowel management:  - Ideally you would have 1 well formed BM every 1-2 days.  Adjust bowel regimen based on that goal or as close to that as possible  - Start with taking colace/docuate 100mg (stool softener) twice daily. Can wean this down for soft stools  - If still constipated, you can add senna 1-2 tablets daily (stimulant laxative)  - If still constipated, can trial miralax (osmotic laxative)  - If still constipated you can take either oral or by rectum dulcolax (but not at the same time)  - If unable to go for more than 4 days notify your physician for additional recommendations    - Follow-up with your primary care physician at next visit  - If you develop significant abdominal pain, nausea/vomiting, or other concerns seek medical attention right away.      Please note a summary of your hospital stay with relevant information for your doctors will try to be sent to them.  Please confirm with your doctors at your follow up visits that they have received this summary and have them contact [facility] Medical Records if they have not received them along with any other medical records they may require.     [facility] Phone Number:  [phone]    Discharge Instructions - Podiatry    Weight  Bearing Status: Non-weight bearing to right lower extremity                   Pain: Continue analgesics as needed    Follow-up appointment instructions: Please make an appointment within one week of discharge with . [facility]. Contact sooner if any increase in pain, or signs of infection occur    Wound Care: Leave dressings clean, dry, and intact between professional dressing changes    Nursing Instructions: Please apply  adaptic, an absorptive dressing (maxorb) to right posterior ankle wound . Then cover with Gauze and secure with Kerlix and tape. Please change dressing 3x a week .

## 2024-03-12 LAB
GLUCOSE SERPL-MCNC: 120 MG/DL (ref 65–140)
GLUCOSE SERPL-MCNC: 124 MG/DL (ref 65–140)
GLUCOSE SERPL-MCNC: 82 MG/DL (ref 65–140)

## 2024-03-12 PROCEDURE — 99232 SBSQ HOSP IP/OBS MODERATE 35: CPT | Performed by: PHYSICAL MEDICINE & REHABILITATION

## 2024-03-12 PROCEDURE — 97530 THERAPEUTIC ACTIVITIES: CPT

## 2024-03-12 PROCEDURE — 82948 REAGENT STRIP/BLOOD GLUCOSE: CPT

## 2024-03-12 PROCEDURE — 97110 THERAPEUTIC EXERCISES: CPT

## 2024-03-12 PROCEDURE — 99232 SBSQ HOSP IP/OBS MODERATE 35: CPT | Performed by: INTERNAL MEDICINE

## 2024-03-12 PROCEDURE — 97535 SELF CARE MNGMENT TRAINING: CPT

## 2024-03-12 RX ORDER — BISACODYL 5 MG/1
5 TABLET, DELAYED RELEASE ORAL ONCE
Status: COMPLETED | OUTPATIENT
Start: 2024-03-12 | End: 2024-03-12

## 2024-03-12 RX ORDER — LISINOPRIL 2.5 MG/1
2.5 TABLET ORAL DAILY
Status: DISCONTINUED | OUTPATIENT
Start: 2024-03-12 | End: 2024-03-13

## 2024-03-12 RX ADMIN — ACETAMINOPHEN 975 MG: 325 TABLET, FILM COATED ORAL at 21:05

## 2024-03-12 RX ADMIN — CEFPODOXIME PROXETIL 400 MG: 200 TABLET, FILM COATED ORAL at 08:08

## 2024-03-12 RX ADMIN — Medication 1 TABLET: at 08:06

## 2024-03-12 RX ADMIN — ASPIRIN 81 MG CHEWABLE TABLET 81 MG: 81 TABLET CHEWABLE at 08:06

## 2024-03-12 RX ADMIN — RIVAROXABAN 2.5 MG: 2.5 TABLET, FILM COATED ORAL at 17:01

## 2024-03-12 RX ADMIN — CYANOCOBALAMIN TAB 500 MCG 1000 MCG: 500 TAB at 08:06

## 2024-03-12 RX ADMIN — ATORVASTATIN CALCIUM 10 MG: 10 TABLET, FILM COATED ORAL at 17:01

## 2024-03-12 RX ADMIN — METHOCARBAMOL 250 MG: 500 TABLET ORAL at 13:33

## 2024-03-12 RX ADMIN — CLONAZEPAM 1 MG: 1 TABLET ORAL at 17:01

## 2024-03-12 RX ADMIN — POLYETHYLENE GLYCOL 3350 17 G: 17 POWDER, FOR SOLUTION ORAL at 08:06

## 2024-03-12 RX ADMIN — PANTOPRAZOLE SODIUM 40 MG: 40 TABLET, DELAYED RELEASE ORAL at 06:30

## 2024-03-12 RX ADMIN — BUPROPION HYDROCHLORIDE 300 MG: 150 TABLET, FILM COATED, EXTENDED RELEASE ORAL at 08:09

## 2024-03-12 RX ADMIN — BISACODYL 5 MG: 5 TABLET, COATED ORAL at 09:52

## 2024-03-12 RX ADMIN — METHOCARBAMOL 250 MG: 500 TABLET ORAL at 06:30

## 2024-03-12 RX ADMIN — PREGABALIN 200 MG: 100 CAPSULE ORAL at 21:05

## 2024-03-12 RX ADMIN — ACETAMINOPHEN 975 MG: 325 TABLET, FILM COATED ORAL at 13:33

## 2024-03-12 RX ADMIN — RIVAROXABAN 2.5 MG: 2.5 TABLET, FILM COATED ORAL at 08:09

## 2024-03-12 RX ADMIN — SENNOSIDES, DOCUSATE SODIUM 1 TABLET: 8.6; 5 TABLET ORAL at 08:06

## 2024-03-12 RX ADMIN — PREGABALIN 200 MG: 100 CAPSULE ORAL at 17:01

## 2024-03-12 RX ADMIN — METHOCARBAMOL 250 MG: 500 TABLET ORAL at 21:05

## 2024-03-12 RX ADMIN — METFORMIN HYDROCHLORIDE 500 MG: 500 TABLET, FILM COATED ORAL at 17:01

## 2024-03-12 RX ADMIN — METFORMIN HYDROCHLORIDE 500 MG: 500 TABLET, FILM COATED ORAL at 08:09

## 2024-03-12 RX ADMIN — SENNOSIDES, DOCUSATE SODIUM 1 TABLET: 8.6; 5 TABLET ORAL at 17:00

## 2024-03-12 RX ADMIN — LISINOPRIL 2.5 MG: 2.5 TABLET ORAL at 09:52

## 2024-03-12 RX ADMIN — CEFPODOXIME PROXETIL 400 MG: 200 TABLET, FILM COATED ORAL at 17:01

## 2024-03-12 RX ADMIN — FLUTICASONE FUROATE AND VILANTEROL TRIFENATATE 1 PUFF: 200; 25 POWDER RESPIRATORY (INHALATION) at 08:08

## 2024-03-12 RX ADMIN — PREGABALIN 200 MG: 100 CAPSULE ORAL at 08:07

## 2024-03-12 RX ADMIN — NICOTINE 1 PATCH: 14 PATCH, EXTENDED RELEASE TRANSDERMAL at 08:07

## 2024-03-12 RX ADMIN — ACETAMINOPHEN 975 MG: 325 TABLET, FILM COATED ORAL at 06:30

## 2024-03-12 RX ADMIN — Medication 20 MG: at 08:08

## 2024-03-12 RX ADMIN — OMEGA-3 FATTY ACIDS CAP 1000 MG 1000 MG: 1000 CAP at 08:09

## 2024-03-12 NOTE — PROGRESS NOTES
03/12/24 0830   Pain Assessment   Pain Assessment Tool 0-10   Pain Score No Pain   Restrictions/Precautions   Precautions Bed/chair alarms;Cognitive;Fall Risk;Hard of hearing;Impulsive;Contact/isolation;Multiple lines;Pressure Ulcer;Supervision on toilet/commode   Weight Bearing Restrictions Yes   RLE Weight Bearing Per Order NWB   ROM Restrictions No   Braces or Orthoses Other (Comment)  (R prevalon boot)   Cognition   Overall Cognitive Status Impaired   Arousal/Participation Alert;Cooperative   Attention Attends with cues to redirect   Orientation Level Oriented X4   Memory Decreased short term memory   Following Commands Follows one step commands without difficulty   Roll Left and Right   Type of Assistance Needed Independent   Roll Left and Right CARE Score 6   Sit to Lying   Type of Assistance Needed Independent   Comment HOB flat, no use of bed rails   Sit to Lying CARE Score 6   Lying to Sitting on Side of Bed   Type of Assistance Needed Independent   Comment HOB flat, no use of bed rails   Lying to Sitting on Side of Bed CARE Score 6   Sit to Stand   Type of Assistance Needed Physical assistance;Adaptive equipment   Comment Morris/CGA with STS with RW, unsteady upon standing, VCs to maintain NWB status   Sit to Stand CARE Score -   Bed-Chair Transfer   Type of Assistance Needed Set-up / clean-up   Comment sit pivot transfer from WC<>EOB, WC<>EOM. Practice SPT with RW WC<>EOM  required Morris for steadying and max VCs to maintain NWB status. SPT with RW is not appropriate for patient to complete at home. Pt verbalizes understanding and states she does not like to use the RW   Chair/Bed-to-Chair Transfer CARE Score 5   Walk 10 Feet   Type of Assistance Needed Physical assistance   Physical Assistance Level 26%-50%   Comment Morris with RW to hop, Mod to Max VCs to maintain NWB on RLE   Walk 10 Feet CARE Score 3   Walk 50 Feet with Two Turns   Reason if not Attempted Safety concerns   Walk 50 Feet with Two Turns  CARE Score 88   Walk 150 Feet   Reason if not Attempted Safety concerns   Walk 150 Feet CARE Score 88   Walking 10 Feet on Uneven Surfaces   Reason if not Attempted Safety concerns   Walking 10 Feet on Uneven Surfaces CARE Score 88   Ambulation   Primary Mode of Locomotion Prior to Admission Walk   Distance Walked (feet) 10 ft   Assist Device Roller Walker   Limitations Noted In Balance;Device Management;Endurance;Speed;Strength   Provided Assistance with: Balance   Walk Assist Level Minimum Assist   Findings Not recommending patient ambulate at home on discharge due to inability to maintain NWB on RLE and high fall risk. Pt to use WC for primary means of mobility. Patient verbalizes understanding   Does the patient walk? 2. Yes   Wheel 50 Feet with Two Turns   Type of Assistance Needed Independent;Adaptive equipment   Wheel 50 Feet with Two Turns CARE Score 6   Wheelchair mobility   Does the patient use a wheelchair? 1. Yes   Type of Wheelchair Used 1. Manual   Method Left upper extremity;Right upper extremity;Left lower extremity   Distance Level Surface (feet) 115 ft  (x2)   Findings Demonstrates good navigation of turns and obstacles with WC   Curb or Single Stair   Reason if not Attempted Safety concerns   1 Step (Curb) CARE Score 88   4 Steps   Reason if not Attempted Safety concerns   4 Steps CARE Score 88   12 Steps   Reason if not Attempted Safety concerns   12 Steps CARE Score 88   Picking Up Object   Comment not recommending pt perform from standing position due to impaired balance and NWB on RLE   Reason if not Attempted Safety concerns   Picking Up Object CARE Score 88   Assessment   Treatment Assessment Patient participated in brief 30 mintue skilled physicalt therapy session focusing on various styles of transfers and WC mobility. Recommending patient only perform sit pivot transfers at home as patient continues to require Morris/CGA for SPT with RW and difficulty maintain NWB on RLE. Patient will  benefit from continued skilled PT services to preparation for dischare. PT to focus on WC mobility (even and uneven surfaces), sit pivot transfers, LE strengthening, and pt education on fall prevention   Problem List Decreased strength;Decreased endurance;Impaired balance;Decreased mobility;Decreased cognition;Impaired judgement;Decreased safety awareness;Impaired hearing;Decreased skin integrity;Orthopedic restrictions   Plan   Treatment/Interventions Functional transfer training;LE strengthening/ROM;Therapeutic exercise;Endurance training;Patient/family training;Bed mobility;Gait training   Progress Progressing toward goals   Discharge Recommendation   Rehab Resource Intensity Level, PT   ( PT)   Equipment Recommended Wheelchair   PT Therapy Minutes   PT Time In 0830   PT Time Out 0900   PT Total Time (minutes) 30   PT Mode of treatment - Individual (minutes) 30   PT Mode of treatment - Concurrent (minutes) 0   PT Mode of treatment - Group (minutes) 0   PT Mode of treatment - Co-treat (minutes) 0   PT Mode of Treatment - Total time(minutes) 30 minutes   PT Cumulative Minutes 540   Therapy Time missed   Time missed? No     Elenita Miller, PT, DPT

## 2024-03-12 NOTE — PROGRESS NOTES
"   03/12/24 1000   Pain Assessment   Pain Assessment Tool 0-10   Pain Score No Pain   Restrictions/Precautions   Precautions Bed/chair alarms;Cognitive;Fall Risk;Hard of hearing;Impulsive;Contact/isolation;Multiple lines;Pressure Ulcer;Supervision on toilet/commode   Weight Bearing Restrictions Yes   RLE Weight Bearing Per Order NWB   ROM Restrictions No   Braces or Orthoses Other (Comment)  (R prevalon)   Lifestyle   Autonomy \"I really like this.\" referring to 4x a day pill organizer   Eating   Type of Assistance Needed Independent   Physical Assistance Level No physical assistance   Eating CARE Score 6   Putting On/Taking Off Footwear   Type of Assistance Needed Set-up / clean-up   Physical Assistance Level No physical assistance   Comment pt able to don/doff L sock/shoe. able to don/doff R prevalon boot   Putting On/Taking Off Footwear CARE Score 5   Bed-Chair Transfer   Type of Assistance Needed Set-up / clean-up   Physical Assistance Level No physical assistance   Comment for sit pivots from bed and w/c   Chair/Bed-to-Chair Transfer CARE Score 5   Toileting Hygiene   Type of Assistance Needed Set-up / clean-up   Physical Assistance Level No physical assistance   Comment IF pt wearing dresses, house coat, etc, AND no underwear, pt able to complete at set up level. spoke at length with pt regarding problem solving toileting at home as pt cannot complete while seated on BSC via weight shifting w/o breaking RLE NWBing restrictions even with extensive time as well as requiring up to min A. pt very much agreeable to house coats, dresses, and no underwear on days she does not have doctor appointments. daughter will assist with LE dressing on days she has doctor appointments. spoke with pt's daughter who confirms spouse and daughter most likely cannot provide physical assistance for toileting tasks.   Toileting Hygiene CARE Score 5   Toilet Transfer   Type of Assistance Needed Set-up / clean-up   Physical Assistance " Level No physical assistance   Comment set up of DA BSC; sit pivots only   Toilet Transfer CARE Score 5   Health Management   Health Management Level of Assistance Independent   Health Management re-assessed medication management with use of 4 x a day pill organizer with pt noted to have greater success and independence with task compared to previous assessment with using only 1 x a day pill box and her previous method PTA. pt receptive to purchasing and using 4x a day pill organizer at time of discharge to accommodate her newer medications that are multiple times a day. spoke with pt daughter regarding purchasing 4x a day pill organizer which daughter will purchase prior to pt d/c.   Cognition   Overall Cognitive Status Impaired   Arousal/Participation Alert;Cooperative   Attention Attends with cues to redirect   Orientation Level Oriented X4   Memory Decreased short term memory   Following Commands Follows one step commands without difficulty   Activity Tolerance   Activity Tolerance Patient tolerated treatment well   Assessment   Treatment Assessment pt engages in 90 minute skilled OT Session focusing on toileting, sit pivots, and medication management. see above for full func details. called and spoke with pt's daughter with pt present regarding OT CLOF, home safety recommendations and IADL recommendations. edu provided to pt and daughter regarding possibly wearing house dresses/coats and no underwear for ease and IND with toileting tasks--pt and daughter both in agreement. spoke with daughter regarding purchasing 4x a day pill organizer which pt is receptive to using at time of d/c and daughter will purchase prior to d/c. finally provided update on CLOF regarding set up for ADLs and sit pivot transfers with cueing to ensure pt adherence to RLE NWBing. daughter greatful for update with all questions answered at this time. pt remains on track for d/c home Thursday, 3/14/24 with recommendation of home OT services  to continue to focus on IND with self care tasks and IADLs via w/c level and eventually RW level.   Prognosis Fair   Problem List Decreased strength;Decreased endurance;Impaired balance;Decreased mobility;Decreased cognition;Impaired judgement;Decreased safety awareness;Impaired hearing;Decreased skin integrity;Orthopedic restrictions   Plan   Treatment/Interventions ADL retraining;Functional transfer training;Therapeutic exercise;Endurance training;Cognitive reorientation;Patient/family training;Equipment eval/education;Compensatory technique education   OT Therapy Minutes   OT Time In 1000   OT Time Out 1130   OT Total Time (minutes) 90   OT Mode of treatment - Individual (minutes) 90   OT Mode of treatment - Concurrent (minutes) 0   OT Mode of treatment - Group (minutes) 0   OT Mode of treatment - Co-treat (minutes) 0   OT Mode of Treatment - Total time(minutes) 90 minutes   OT Cumulative Minutes 480   Therapy Time missed   Time missed? No

## 2024-03-12 NOTE — PROGRESS NOTES
03/12/24 1430   Pain Assessment   Pain Assessment Tool 0-10   Pain Score No Pain   Restrictions/Precautions   Precautions Bed/chair alarms;Cognitive;Fall Risk;Hard of hearing;Impulsive;Contact/isolation;Multiple lines;Pressure Ulcer;Supervision on toilet/commode   Weight Bearing Restrictions Yes   RLE Weight Bearing Per Order NWB   ROM Restrictions No   Braces or Orthoses Other (Comment)  (R prevalon boot)   Cognition   Overall Cognitive Status Impaired   Arousal/Participation Alert;Cooperative   Attention Attends with cues to redirect   Orientation Level Oriented X4   Memory Decreased short term memory   Following Commands Follows one step commands without difficulty   Roll Left and Right   Type of Assistance Needed Independent   Roll Left and Right CARE Score 6   Sit to Lying   Type of Assistance Needed Independent   Sit to Lying CARE Score 6   Lying to Sitting on Side of Bed   Type of Assistance Needed Independent   Lying to Sitting on Side of Bed CARE Score 6   Sit to Stand   Type of Assistance Needed Physical assistance   Physical Assistance Level 25% or less   Comment Morris/CGA for STS with RW wtih max VCs to maintain NWB on RLE, not recommending STS transfers at home due to inability to maintain NWB on RLE   Sit to Stand CARE Score 3   Bed-Chair Transfer   Comment set up assistance for sit pivot transfer, pt does a good job setting up environement and managing WC parts. allow increased time to set up. Morris for STS with RW   Car Transfer   Type of Assistance Needed Incidental touching   Comment CGA for sit pivot WC<>car   Car Transfer CARE Score 4   Walk 50 Feet with Two Turns   Reason if not Attempted Safety concerns   Walk 50 Feet with Two Turns CARE Score 88   Walk 150 Feet   Reason if not Attempted Safety concerns   Walk 150 Feet CARE Score 88   Walking 10 Feet on Uneven Surfaces   Reason if not Attempted Safety concerns   Walking 10 Feet on Uneven Surfaces CARE Score 88   Wheel 50 Feet with Two Turns    Type of Assistance Needed Independent;Adaptive equipment   Wheel 50 Feet with Two Turns CARE Score 6   Wheel 150 Feet   Type of Assistance Needed Independent;Adaptive equipment   Wheel 150 Feet CARE Score 6   Wheelchair mobility   Does the patient use a wheelchair? 1. Yes   Type of Wheelchair Used 1. Manual   Method Right upper extremity;Left upper extremity;Left lower extremity   Distance Level Surface (feet) 150 ft  (52 ft x2)   Findings allow increased time for removing/replacing R leg rest.   Curb or Single Stair   Reason if not Attempted Safety concerns   1 Step (Curb) CARE Score 88   4 Steps   Reason if not Attempted Safety concerns   4 Steps CARE Score 88   12 Steps   Reason if not Attempted Safety concerns   12 Steps CARE Score 88   Stairs   Findings Ramp has been installed at patient's home. Pt reports that her daughters can assist as needed to help her on the ramp   Picking Up Object   Comment not recommending pt perform from standing position due to impaired balance and NWB on RLE   Reason if not Attempted Safety concerns   Picking Up Object CARE Score 88   Therapeutic Interventions   Strengthening seated knee extension 5# LLE 3x10, 0# RLE, seated hip abduction with RTB 3x10, seated hamstring curl with RTB 3x10 LLE only, seated L hip flexion 5# 3x10, R hip flexion 3# 3x10   Equipment Use   NuStep Level 2 x 10 minutes bilateral UEs and LEs.   Assessment   Treatment Assessment Patient participated in 60 minute skilled physical therapy session foucsing on transfers, WC mobility and seated LE strengthening. Did not place weight on RLE due to graft site. Spoke with OT regarding potential for IRP. Patient was not progressed to IRP due to inability to maintain NWB on RLE with toileting hygeine and dressing. Patient requires set up assistance for transfers and is independent with WC mobility over even surfaces. Patient discharges home on 3/14 with supportive family and HH SN, PT, and OT services.   Problem List  Decreased strength;Decreased endurance;Impaired balance;Decreased mobility;Decreased cognition;Impaired judgement;Decreased safety awareness;Impaired hearing;Decreased skin integrity;Orthopedic restrictions   Barriers to Discharge Inaccessible home environment;Decreased caregiver support   Plan   Treatment/Interventions Functional transfer training;LE strengthening/ROM;Therapeutic exercise;Endurance training;Equipment eval/education;Bed mobility;Gait training;Compensatory technique education   Progress Progressing toward goals   Discharge Recommendation   Rehab Resource Intensity Level, PT   (HH PT)   Equipment Recommended Wheelchair   PT Therapy Minutes   PT Time In 1430   PT Time Out 1530   PT Total Time (minutes) 60   PT Mode of treatment - Individual (minutes) 60   PT Mode of treatment - Concurrent (minutes) 0   PT Mode of treatment - Group (minutes) 0   PT Mode of treatment - Co-treat (minutes) 0   PT Mode of Treatment - Total time(minutes) 60 minutes   PT Cumulative Minutes 570   Therapy Time missed   Time missed? No     Elenita Miller, PT, DPT

## 2024-03-12 NOTE — PROGRESS NOTES
[facility]  Progress Note  Name: Lona Cedeno I  MRN: [id number]  Unit/Bed#: -01 I Date of Admission: 3/5/2024   Date of Service: 3/12/2024 I Hospital Day: 7    Assessment/Plan   Diabetic foot ulcer with osteomyelitis (HCC)  Assessment & Plan  Lab Results   Component Value Date    HGBA1C 6.0 (H) 02/28/2024       Recent Labs     03/11/24  1039 03/11/24  1604 03/11/24  2107 03/12/24  0620   POCGLU 152* 99 133 124         Blood Sugar Average: Last 72 hrs:  (P) 136  S/p medial ulcer debridement/partial second toe amputation 2/4/2024  Recent STSG on 2/28/2024 with VAC removal 3/4/2024  Continue local care; refer to images  NWB  podiatry following for management  PMR managing therapy/pain      * Surgical site infection  Assessment & Plan  S/p right axilla/femoral bypass 1/31/2024  Postop infection s/p washout/debridement 2/14 and 2/19  VAC removed 2/28/2024  Continue oral antibiotics through 3/22/2024  Cultures positive Klebsiella/Proteus  Continue local dressing changes  Will consult vascular surgery if needed for local care  Refer to media    PAD (peripheral artery disease) ([facility])  Assessment & Plan  S/p right axillo-femoral bypass 1/31/2024  Continue aspirin/statin/low-dose Xarelto    Essential hypertension  Assessment & Plan  Was taking Lisinopril 5mg daily and amiloride 5mg daily prior to admission.  BP remains stable on no medications here however will add back 2.5mg lisinopril d/t renal protection for her DM  Hydralazine as needed is ordered however has not required any doses    Type 2 diabetes mellitus with diabetic polyneuropathy (HCC)  Assessment & Plan  Lab Results   Component Value Date    HGBA1C 6.0 (H) 02/28/2024       Recent Labs     03/11/24  1039 03/11/24  1604 03/11/24  2107 03/12/24  0620   POCGLU 152* 99 133 124         Blood Sugar Average: Last 72 hrs:  (P) 136  Home: Takes metformin 1000 mg twice daily/Januvia 100 mg at lunchtime  Here: DC sliding scale  as BS have remained stable with metformin 500mg bid  stable    Tobacco use disorder  Assessment & Plan  Continue nicotine patch  Recommend cessation    Sacral wound  Assessment & Plan                     The above assessment and plan was reviewed and updated as determined by my evaluation of the patient on 3/12/2024.    Labs:   Results from last 7 days   Lab Units 03/11/24  0559   WBC Thousand/uL 7.23   HEMOGLOBIN g/dL 12.1   HEMATOCRIT % 39.0   PLATELETS Thousands/uL 318     Results from last 7 days   Lab Units 03/11/24  0559   SODIUM mmol/L 141   POTASSIUM mmol/L 4.0   CHLORIDE mmol/L 102   CO2 mmol/L 30   BUN mg/dL 17   CREATININE mg/dL 0.62   CALCIUM mg/dL 9.8             Results from last 7 days   Lab Units 03/12/24  0620 03/11/24  2107 03/11/24  1604   POC GLUCOSE mg/dl 124 133 99       Imaging  No orders to display       Review of Scheduled Meds:  Current Facility-Administered Medications   Medication Dose Route Frequency Provider Last Rate    acetaminophen  975 mg Oral Q8H JILLIAN Vanesa Sheehan, CRNP      albuterol  2 puff Inhalation Q6H PRN Vanesa Sheehan, CRNP      Apoaequorin  20 mg Oral Daily Denisse Berger PA-C      aspirin  81 mg Oral Daily Vanesa Sheehan, CRNP      atorvastatin  10 mg Oral Daily With Dinner Denisse Berger PA-C      buPROPion  300 mg Oral Daily Vanesa Sheehan, CRNP      cefpodoxime  400 mg Oral BID With Meals Vanesa Sheehan, CRNP      clonazePAM  1 mg Oral QPM Vanesa Sheehan, CRNP      cyanocobalamin  1,000 mcg Oral Daily Vanesa Sheehan, CRNP      fish oil  1,000 mg Oral Daily Vanesa Sheehan, CRNP      fluticasone-vilanterol  1 puff Inhalation Daily Vanesa Sheehan, CRNP      hydrALAZINE  25 mg Oral Q8H PRN Vanesa Sheehan, CRNP      lisinopril  2.5 mg Oral Daily Vanesa Sheehan, CRNP      melatonin  6 mg Oral HS PRN Alan England MD      metFORMIN  500 mg Oral BID With Meals Vanesa Sheehan, CRNP      methocarbamol  250 mg Oral Q8H JILLIAN Vanesa Sheehan, CRNP      multivitamin-minerals  1 tablet Oral Daily  Vanesa Sheehan, CRNP      nicotine  1 patch Transdermal Daily Vanesa Sheehan, CRNP      oxyCODONE  2.5 mg Oral Q4H PRN Vanesa Sheehan, CRNP      Or    oxyCODONE  5 mg Oral Q4H PRN Vanesa Sheehan, CRNP      pantoprazole  40 mg Oral Early Morning Vanesa Sheehan, CRNP      polyethylene glycol  17 g Oral Daily Vanesa Sheehan, CRNP      pregabalin  200 mg Oral TID Vanesa Sheehan, CRNP      rivaroxaban  2.5 mg Oral BID Vanesa Sheehan, CRNP      senna-docusate sodium  1 tablet Oral BID Vanesa Sheehan, CRNP         Subjective/ HPI: Patient seen and examined. Patients overnight issues or events were reviewed with nursing staff. New or overnight issues include the following:     Pt seen at bedside. Overall doing well, looking forward to dc later this wk    ROS:   A 10 point ROS was performed; negative except as noted above.        *Labs /Radiology studies Reviewed  *Medications  reviewed and reconciled as needed  *Please refer to order section for additional ordered labs studies      Physical Examination:  Vitals:   Vitals:    03/11/24 0500 03/11/24 1500 03/11/24 2006 03/12/24 0616   BP: 115/58 126/58 122/66 103/68   BP Location: Left arm Left arm Left arm Right arm   Pulse: 74 72 73 72   Resp: 17 17 16 17   Temp: 97.5 °F (36.4 °C) 98.2 °F (36.8 °C) 97.7 °F (36.5 °C) 97.8 °F (36.6 °C)   TempSrc: Oral Oral Oral Oral   SpO2: 95% 92% 90% 94%   Weight:           General Appearance: NAD; pleasant  HEENT: PERRLA, conjuctiva normal; mucous membranes moist; face symmetrical  Neck:  Supple  Lungs: clear bilaterally, normal respiratory effort, no retractions, expiratory effort normal, on room air  CV: regular rate and rhythm, no murmurs rubs or gallops noted   ABD: soft non tender, +BS x4  EXT: DP pulses intact, no lymphadenopathy, no edema; RLE with dressing in place and prevalon boot  Skin: normal turgor, normal texture, no rash; R groin wound with dressing in place; refer to media  Psych: affect normal, mood normal  Neuro: AAOx3       The above  physical exam was reviewed and updated as determined by my evaluation of the patient on 3/12/2024.    Invasive Devices       Drain  Duration             Ureteral Internal Stent Left ureter 6 Fr. 112 days                       VTE Pharmacologic Prophylaxis: Xarelto  Code Status: Level 3 - DNAR and DNI  Current Length of Stay: 7 day(s)    Total floor / unit time spent today 30 minutes  Coordination of patient's care was performed in conjunction with primary service. Time invested included review of patient's labs, vitals, and management of their comorbidities with continued monitoring, examination of patient as well as answering patient questions, documenting her findings and creating progress note in electronic medical record,  ordering appropriate diagnostic testing.       ** Please Note:  voice to text software may have been used in the creation of this document. Although proof errors in transcription or interpretation are a potential of such software**

## 2024-03-12 NOTE — PROGRESS NOTES
Physical Medicine and Rehabilitation Progress Note  Lona Cedeno 78 y.o. female MRN: [id number]  Unit/Bed#: [facility] 974-01 Encounter: [id number]    To Review: Lona Cedeno is a 78 y.o. female with history of anxiety, closed fracture of the coccyx in May 2023, diabetes, GERD, hyperlipidemia, hypertension, IBS who presented to the [facility] on 1/23 from the podiatry office with a diabetic ulcer of the right toe and right ankle with exposed Achilles tendon.  She was stabbed have osteomyelitis of the right foot transferred to [facility] to reconsider vascularization and surgical options.  She is recommended to undergo an extra-anatomic bypass.  There was discussion about right transtibial amputation however decision was made for attempted limb salvage.  She underwent the above bypass on 1/31/2024 as well as on 2/4/2024 and underwent right toe and heel wound debridement with cycle and partial amputation of the toe and VAC placement.  Her course was complicated by arrest chest wall hematoma s/p evacuation on 2/4 with vascular.  On 2/13 the patient needed discharge back to acute care in the setting of fevers and leukocytosis from a potential infectious standpoint.  She was also given a discharge from the [facility] to acute care setting after podiatry wanted to take the patient back for split thickness skin grafting which occurred on 2/28 with Dr. Yeboah.  Patient was transition to oral antibiotics and wound vacs have been removed.  She remains nonweightbearing to the right lower extremity. The patient was evaluated by the Rehabilitation team and deemed an appropriate candidate for comprehensive inpatient rehabilitation and admitted to the [facility] on 3/5/2024  2:03 PM     Chief Complaint: No new concerns.     Interval History/Subjective:  No major issues overnight. Still struggles to maintain WB precautions when transferring, so not cleared for IRP. No new CP, SOB, fevers, chills, N/V,  abdominal pain, significant pain. Last BM was 3/8.     ROS:  A 10 point review of systems was negative except for what is noted in the HPI.    Today's Changes:  PRN if no BM today  Otherwise continue current plan of care.   Medications for discharge reviewed with IM, who also reviewed with patient    Total visit time: 35 minutes, with more than 50% spent counseling/coordinating care. Counseling includes discussion with patient re: progress in therapies, functional issues observed by therapy staff, and discussion with patient regarding their current medical state and wellbeing. Coordination of patient's care was performed in conjunction with Internal Medicine service to monitor patient's labs, vitals, and management of their comorbidities.      Assessment/Plan:    * Surgical site infection  Assessment & Plan  Needed treatment with cefepime for surgical site infection however also on vancomycin which was discontinued on 2/16  Right groin breakdown status post wound washout with VAC placement on 2/14 as well as 2/19  Was followed by infectious disease and continued on cefepime with plan to transition to cefpodoxime 400 mg twice daily for 4 weeks.  Cultures were positive for Klebsiella and Proteus  VAC to the right groin as well as the foot have since been discontinued and continues for cefpodoxime for antibiotic treatment  Maintain nonweightbearing to the right lower extremity  Underwent split thickness skin grafting with podiatry on 2/28 for the right foot  Due to the surgical site infection, sepsis and requirement for multiple trips to the OR for wound debridement, VAC placement, split-thickness grafting patient is not at her functional baseline and would benefit from daily physician oversight and rehabilitation nursing as well as aggressive physical and occupational therapy under guidance of physical medicine and rehabilitation specialist.  Patient will tolerate this 3 hours/day 5 to 7 days/week with ultimate  discharge goal for the community    Impulsive  Assessment & Plan  Had impulsive behaviors during prior admissions and discussed overall behavioral plan.  Patient on last admission was impulsive and not using call bell appropriately as well as not following weightbearing restrictions  Discussed with patient again however continued vigilance with nursing and therapy checks, alarms and relatively sensitive settings and would benefit from Q 2-hour bowel bladder checks    Sacral wound  Assessment & Plan  Unstageable wound on the sacrum on initial evaluations, POA  Consult wound care for management  Vigilant turns in bed and weight shifts, daily sacral checks by nursing    Impaired mobility and activities of daily living  Assessment & Plan  - Rehabilitation medicine physician for daily monitoring of care, 24 hour availability for acute  medical issues, medication management, and therapeutic and diagnostic assessments.  - 24 hour rehabilitation nursing 7 days per week for: management/teaching of medications,  bowel/bladder routine, skin care.  - PT, OT for 2-3 hours per day, 5 to 6 days per week; 15 hours per week  - Rehabilitation Psychology for adjustment and coping  - MSW for barriers to discharge, community resources, and family support  - Discharge planning following to help ensure a safe and efficient discharge    Diabetic foot ulcer with osteomyelitis (HCC)  Assessment & Plan  Patient presented with right posterior ankle ulceration with exposed Achilles tendon and required foot debridement on 2/4/2024 with VAC placement with right medial ankle ulcer, right second toe osteomyelitis status post right second toe amputation  Wound vacs have since been discontinued and continues to be followed by podiatry  Require going back to the OR on 2/28 for split-thickness grafting  Remains nonweightbearing to the right lower extremity      Acute pain due to injury  Assessment & Plan  Acetaminophen 975 mg every 8 hours  Lyrica 200  mg 3 times daily  Robaxin 250 mg every 8 hours  Oxycodone 2.5-5 mg every 4 hours as needed    Anemia  Assessment & Plan  Most recent hemoglobin 12.1 which is just within the normal range but has been overall improving  Continue to follow biweekly labs or sooner if clinically indicated as patient is overall at risk for drop in hemoglobin  On review of the last 2 weeks of labs has improved slightly from the mid to upper 10s  Continue B12 supplementation    Tobacco use disorder  Assessment & Plan  Smoking cessation counseling  Had been on nicotine patch 21 mg / 24-hour, however decreased to 14 mg    COPD, severity to be determined ([facility])  Assessment & Plan  Continue inhalers per internal medicine including albuterol as needed as well as fluticasone-vilanterol 1 puff daily  Monitor oxygen saturations and therapy    PAD (peripheral artery disease) ([facility])  Assessment & Plan  Patient was on aspirin, statin and low-dose Xarelto 2.5 mg twice daily  Status post 1/31 bypass axillary-femoral, right with 8 mm ringed PTFE graft    GERD without esophagitis  Assessment & Plan  Continue Protonix    Anxiety and depression  Assessment & Plan  Continue Wellbutrin  mg daily as well as Klonopin  Supportive counseling and consider neuropsychology if indicated and patient agreeable    Essential hypertension  Assessment & Plan  Monitor pressures in therapy however has been off of regular scheduled antihypertensives  Ordered hydralazine as needed for systolic blood pressure greater than 160    Type 2 diabetes mellitus with diabetic polyneuropathy ([facility])  Assessment & Plan  Lab Results   Component Value Date    HGBA1C 6.0 (H) 02/28/2024       Recent Labs     03/11/24  1604 03/11/24  2107 03/12/24  0620 03/12/24  1047   POCGLU 99 133 124 82     Here: Metformin 500mg BID and CDI/Accuchecks  Monitor per internal medicine and make adjustments as needed  Glycemic control has been good we will need to continue to for appropriate wound healing  potential        Health Maintenance  #Delirium/Sleep: At risk. Optimize sleep/wake, bowel, bladder, and pain management.  #Bowel: Last BM 3/11, miralax daily, Senokot-S 1 tablet BID  #Bladder: Voiding and continent  #Skin/Pressure Injury Prevention: Turn Q2hr in bed, with weight shifts A21-15fht in wheelchair.  #DVT Prophylaxis:  Xarelto ppx dose, SCDs  #GI Prophylaxis: Not indicated  #Code Status: DNR/DNI  #FEN: Diabetic diet, [place] with Lunch/Dinner  #Dispo: ADD 3/14 with Home PT/OT/RN.      Objective:    Functional Update:  PT: Ind bed mobility, CGA-Sup transfers, Set-up wheelchair mobility 300'  OT: Sup ADLs    Allergies per EMR    Physical Exam:  Temp:  [97.7 °F (36.5 °C)-98.2 °F (36.8 °C)] 97.8 °F (36.6 °C)  HR:  [72-73] 72  Resp:  [16-17] 17  BP: (103-126)/(58-68) 103/68  Oxygen Therapy  SpO2: 94 %    Gen: No acute distress, Well-nourished, well-appearing.  HEENT: Moist mucus membranes, Normocephalic/Atraumatic  Cardiovascular: Regular rate, rhythm, S1/S2. Distal pulses palpable  Heme/Extr: No edema  Pulmonary: Non-labored breathing. Lungs CTAB  : No kraft  GI: Soft, non-tender, non-distended. BS+  Integumentary: Skin is warm, dry.   Neuro: AAOx3, Speech is intact. Appropriate to questioning.   Psych: Normal mood and affect.       Diagnostic Studies: Reviewed, no new imaging    Laboratory:  Reviewed   Results from last 7 days   Lab Units 03/11/24  0559   HEMOGLOBIN g/dL 12.1   HEMATOCRIT % 39.0   WBC Thousand/uL 7.23     Results from last 7 days   Lab Units 03/11/24  0559   BUN mg/dL 17   POTASSIUM mmol/L 4.0   CHLORIDE mmol/L 102   CREATININE mg/dL 0.62            Patient Active Problem List   Diagnosis    Type 2 diabetes mellitus with diabetic polyneuropathy (HCC)    Essential hypertension    Anxiety and depression    GERD without esophagitis    Urinary incontinence    Degenerative lumbar disc    Lactic acidosis    Hypomagnesemia    Bladder neoplasm    Left renal mass    PAD (peripheral artery  disease) (HCC)    Abnormal CT of the chest    COPD, severity to be determined (HCC)    Tobacco use disorder    Age-related osteoporosis without current pathological fracture    Ankle ulcer, right, with fat layer exposed (HCC)    Diabetic foot ulcer with osteomyelitis (HCC)    Preoperative cardiovascular examination    Constipation    Hematoma    Anemia    At risk for venous thromboembolism (VTE)    At high risk for skin breakdown    Wound of skin    Impaired mobility and activities of daily living    Acute pain due to injury    Leukocytosis    Surgical site infection    Sepsis without acute organ dysfunction (HCC)    Other dietary vitamin B12 deficiency anemia    Sacral wound    Impulsive         Medications  Current Facility-Administered Medications   Medication Dose Route Frequency Provider Last Rate    acetaminophen  975 mg Oral Q8H JILLIAN Vanesa Sheehan, CRNP      albuterol  2 puff Inhalation Q6H PRN Vanesa Sheehan, CRNP      Apoaequorin  20 mg Oral Daily Denisse Berger PA-C      aspirin  81 mg Oral Daily Vanesa Sheehan, CRNP      atorvastatin  10 mg Oral Daily With Dinner Denisse Berger PA-C      buPROPion  300 mg Oral Daily Vanesa Sheehan, CRNP      cefpodoxime  400 mg Oral BID With Meals Vanesa Sheehan, CRNP      clonazePAM  1 mg Oral QPM Vanesa Sheehan, CRNP      cyanocobalamin  1,000 mcg Oral Daily Vanesa Sheehan, CRNP      fish oil  1,000 mg Oral Daily Vanesa Sheehan, CRNP      fluticasone-vilanterol  1 puff Inhalation Daily Vanesa Sheehan, CRNP      hydrALAZINE  25 mg Oral Q8H PRN Vanesa Sheehan, CRNP      insulin lispro  1-5 Units Subcutaneous TID AC Vanesa Sheehan, CRNP      insulin lispro  1-5 Units Subcutaneous HS Vanesa Sheehan, CRNP      melatonin  6 mg Oral HS PRN Alan England MD      metFORMIN  500 mg Oral BID With Meals Vanesa Sheehan, CRNP      methocarbamol  250 mg Oral Q8H JILLIAN Vanesa Sheehan, CRNP      multivitamin-minerals  1 tablet Oral Daily Vanesa Sheehan, CRNP      nicotine  1 patch Transdermal Daily Vanesa  Sheehan, CRNP      oxyCODONE  2.5 mg Oral Q4H PRN Vanesa Sheehan, CRNP      Or    oxyCODONE  5 mg Oral Q4H PRN Vanesa Sheehan, CRNP      pantoprazole  40 mg Oral Early Morning Vanesa Sheehan, CRNP      polyethylene glycol  17 g Oral Daily Vanesa Sheehan, CRNP      pregabalin  200 mg Oral TID Vanesa Sheehan, CRNP      rivaroxaban  2.5 mg Oral BID Vanesa Sheehan, CRNP      senna-docusate sodium  1 tablet Oral BID Vanesa Sheehan, CRNP            ** Please Note: Fluency Direct voice to text software may have been used in the creation of this document. **

## 2024-03-12 NOTE — PLAN OF CARE
Problem: INFECTION - ADULT  Goal: Absence or prevention of progression during hospitalization  Description: INTERVENTIONS:  - Assess and monitor for signs and symptoms of infection  - Monitor lab/diagnostic results  - Monitor all insertion sites, i.e. indwelling lines, tubes, and drains  - Monitor endotracheal if appropriate and nasal secretions for changes in amount and color  - [place] appropriate cooling/warming therapies per order  - Administer medications as ordered  - Instruct and encourage patient and family to use good hand hygiene technique  - Identify and instruct in appropriate isolation precautions for identified infection/condition  Outcome: Progressing

## 2024-03-12 NOTE — PLAN OF CARE
Problem: PAIN - ADULT  Goal: Verbalizes/displays adequate comfort level or baseline comfort level  Description: Interventions:  - Encourage patient to monitor pain and request assistance  - Assess pain using appropriate pain scale  - Administer analgesics based on type and severity of pain and evaluate response  - Implement non-pharmacological measures as appropriate and evaluate response  - Consider cultural and social influences on pain and pain management  - Notify physician/advanced practitioner if interventions unsuccessful or patient reports new pain  Outcome: Progressing     Problem: INFECTION - ADULT  Goal: Absence or prevention of progression during hospitalization  Description: INTERVENTIONS:  - Assess and monitor for signs and symptoms of infection  - Monitor lab/diagnostic results  - Monitor all insertion sites, i.e. indwelling lines, tubes, and drains  - Monitor endotracheal if appropriate and nasal secretions for changes in amount and color  - [place] appropriate cooling/warming therapies per order  - Administer medications as ordered  - Instruct and encourage patient and family to use good hand hygiene technique  - Identify and instruct in appropriate isolation precautions for identified infection/condition  Outcome: Progressing     Problem: SAFETY ADULT  Goal: Patient will remain free of falls  Description: INTERVENTIONS:  - Educate patient/family on patient safety including physical limitations  - Instruct patient to call for assistance with activity   - Consult OT/PT to assist with strengthening/mobility   - Keep Call bell within reach  - Keep bed low and locked with side rails adjusted as appropriate  - Keep care items and personal belongings within reach  - Initiate and maintain comfort rounds  - Make Fall Risk Sign visible to staff  - Offer Toileting every 2 Hours, in advance of need  - Initiate/Maintain bed/chair alarm  - Obtain necessary fall risk management equipment: nonskid footwear  -  Apply yellow socks and bracelet for high fall risk patients  - Consider moving patient to room near nurses station  Outcome: Progressing     Problem: DISCHARGE PLANNING  Goal: Discharge to home or other facility with appropriate resources  Description: INTERVENTIONS:  - Identify barriers to discharge w/patient and caregiver  - Arrange for needed discharge resources and transportation as appropriate  - Identify discharge learning needs (meds, wound care, etc.)  - Arrange for interpretive services to assist at discharge as needed  - Refer to Case Management Department for coordinating discharge planning if the patient needs post-hospital services based on physician/advanced practitioner order or complex needs related to functional status, cognitive ability, or social support system  Outcome: Progressing

## 2024-03-12 NOTE — PCC PHYSICAL THERAPY
Lona Hastings is independent with bed mobility, set up assistance for transfers, and independent with WC mobility on even surfaces. Patient was not progressed to independent level for transfers or given in room privileges as she requires mod-max verbal cues at times to maintain NWB on RLE when performing toileting hygiene and  lower body dressing. Trialed SPT transfers and short distance ambulation with RW during her stay but pt unable to consistently maintain NWB status and is high risk for falls due to impulsivity with RW. Patient to be WC level at home. Pt verbalized understanding that she is to not use RW at home. Pt will need assistance to manage WC ramp at home which family is able to provide. Patient to be discharged home on 3/14 with family support and  SN, PT, and OT services.

## 2024-03-13 PROBLEM — R45.87 IMPULSIVE: Status: RESOLVED | Noted: 2024-02-23 | Resolved: 2024-03-13

## 2024-03-13 PROBLEM — G89.11 ACUTE PAIN DUE TO INJURY: Status: RESOLVED | Noted: 2024-02-09 | Resolved: 2024-03-13

## 2024-03-13 LAB
GLUCOSE SERPL-MCNC: 125 MG/DL (ref 65–140)
GLUCOSE SERPL-MCNC: 166 MG/DL (ref 65–140)
GLUCOSE SERPL-MCNC: 91 MG/DL (ref 65–140)
GLUCOSE SERPL-MCNC: 93 MG/DL (ref 65–140)

## 2024-03-13 PROCEDURE — 97535 SELF CARE MNGMENT TRAINING: CPT

## 2024-03-13 PROCEDURE — 99232 SBSQ HOSP IP/OBS MODERATE 35: CPT | Performed by: INTERNAL MEDICINE

## 2024-03-13 PROCEDURE — 97110 THERAPEUTIC EXERCISES: CPT

## 2024-03-13 PROCEDURE — 82948 REAGENT STRIP/BLOOD GLUCOSE: CPT

## 2024-03-13 PROCEDURE — 99233 SBSQ HOSP IP/OBS HIGH 50: CPT | Performed by: PHYSICAL MEDICINE & REHABILITATION

## 2024-03-13 PROCEDURE — 97530 THERAPEUTIC ACTIVITIES: CPT

## 2024-03-13 RX ORDER — LISINOPRIL 5 MG/1
5 TABLET ORAL DAILY
Status: DISCONTINUED | OUTPATIENT
Start: 2024-03-14 | End: 2024-03-14 | Stop reason: HOSPADM

## 2024-03-13 RX ORDER — LISINOPRIL 5 MG/1
5 TABLET ORAL DAILY
Start: 2024-03-14

## 2024-03-13 RX ORDER — NICOTINE 21 MG/24HR
1 PATCH, TRANSDERMAL 24 HOURS TRANSDERMAL DAILY
Qty: 28 PATCH | Refills: 0 | Status: SHIPPED | OUTPATIENT
Start: 2024-03-14

## 2024-03-13 RX ORDER — CEFPODOXIME PROXETIL 200 MG/1
400 TABLET, FILM COATED ORAL 2 TIMES DAILY WITH MEALS
Qty: 40 TABLET | Refills: 0 | Status: SHIPPED | OUTPATIENT
Start: 2024-03-14 | End: 2024-03-24

## 2024-03-13 RX ADMIN — Medication 20 MG: at 08:59

## 2024-03-13 RX ADMIN — METHOCARBAMOL 250 MG: 500 TABLET ORAL at 13:47

## 2024-03-13 RX ADMIN — PANTOPRAZOLE SODIUM 40 MG: 40 TABLET, DELAYED RELEASE ORAL at 06:20

## 2024-03-13 RX ADMIN — CYANOCOBALAMIN TAB 500 MCG 1000 MCG: 500 TAB at 08:53

## 2024-03-13 RX ADMIN — ATORVASTATIN CALCIUM 10 MG: 10 TABLET, FILM COATED ORAL at 17:48

## 2024-03-13 RX ADMIN — METHOCARBAMOL 250 MG: 500 TABLET ORAL at 06:19

## 2024-03-13 RX ADMIN — SENNOSIDES, DOCUSATE SODIUM 1 TABLET: 8.6; 5 TABLET ORAL at 17:44

## 2024-03-13 RX ADMIN — PREGABALIN 200 MG: 100 CAPSULE ORAL at 21:21

## 2024-03-13 RX ADMIN — CEFPODOXIME PROXETIL 400 MG: 200 TABLET, FILM COATED ORAL at 09:03

## 2024-03-13 RX ADMIN — ASPIRIN 81 MG CHEWABLE TABLET 81 MG: 81 TABLET CHEWABLE at 08:54

## 2024-03-13 RX ADMIN — ACETAMINOPHEN 975 MG: 325 TABLET, FILM COATED ORAL at 06:20

## 2024-03-13 RX ADMIN — BUPROPION HYDROCHLORIDE 300 MG: 150 TABLET, FILM COATED, EXTENDED RELEASE ORAL at 09:01

## 2024-03-13 RX ADMIN — NICOTINE 1 PATCH: 14 PATCH, EXTENDED RELEASE TRANSDERMAL at 08:53

## 2024-03-13 RX ADMIN — CEFPODOXIME PROXETIL 400 MG: 200 TABLET, FILM COATED ORAL at 17:44

## 2024-03-13 RX ADMIN — Medication 1 TABLET: at 08:54

## 2024-03-13 RX ADMIN — CLONAZEPAM 1 MG: 1 TABLET ORAL at 17:44

## 2024-03-13 RX ADMIN — METFORMIN HYDROCHLORIDE 500 MG: 500 TABLET, FILM COATED ORAL at 17:49

## 2024-03-13 RX ADMIN — METFORMIN HYDROCHLORIDE 500 MG: 500 TABLET, FILM COATED ORAL at 09:01

## 2024-03-13 RX ADMIN — FLUTICASONE FUROATE AND VILANTEROL TRIFENATATE 1 PUFF: 200; 25 POWDER RESPIRATORY (INHALATION) at 08:54

## 2024-03-13 RX ADMIN — ACETAMINOPHEN 975 MG: 325 TABLET, FILM COATED ORAL at 21:21

## 2024-03-13 RX ADMIN — PREGABALIN 200 MG: 100 CAPSULE ORAL at 08:53

## 2024-03-13 RX ADMIN — PREGABALIN 200 MG: 100 CAPSULE ORAL at 17:49

## 2024-03-13 RX ADMIN — METHOCARBAMOL 250 MG: 500 TABLET ORAL at 21:22

## 2024-03-13 RX ADMIN — LISINOPRIL 2.5 MG: 2.5 TABLET ORAL at 08:53

## 2024-03-13 RX ADMIN — RIVAROXABAN 2.5 MG: 2.5 TABLET, FILM COATED ORAL at 17:46

## 2024-03-13 RX ADMIN — OMEGA-3 FATTY ACIDS CAP 1000 MG 1000 MG: 1000 CAP at 09:00

## 2024-03-13 RX ADMIN — ACETAMINOPHEN 975 MG: 325 TABLET, FILM COATED ORAL at 13:47

## 2024-03-13 RX ADMIN — RIVAROXABAN 2.5 MG: 2.5 TABLET, FILM COATED ORAL at 09:01

## 2024-03-13 NOTE — DISCHARGE INSTR - OTHER ORDERS
Skin Care Plan:  1-Right Groin Wound: Cleanse wound with NSS and pat dry. Fluff wound bed with melgisorb Ag and cover with a silicone bordered foam dressing. Gibson with T for Treatment and change daily or PRN soilage/displacement.   2-Turn/reposition q2h or when medically stable for pressure re-distribution on skin .  3-Elevate heels to offload pressure.  4-Moisturize skin daily with skin nourishing cream  5-Ehob cushion in chair when out of bed.  6-RLE and Right Foot: per podiatry recommendations.   7-Left Heel: Apply Silicone Border Foam (Mepilex) to areas. Gibson with P for Prevention and change every 3 days or PRN soilage/displacement. Peel back and inspect Q-shift.  8-Mid Sacrum Wound: Cleanse with soap and water. Pat dry. Apply triad paste to mid sacrum wound and cover with a Silicone Bordered Foam Dressing. Gibson with T for Treatment and change every day or PRN soilage/displacement   9-Ambulatory referral to outpatient wound center for continued treatment of sacro-buttocks and right groin wounds. Referral placed.       Schedule Follow-up Outpatient Wound Appointment. Ambulatory Referral Placed.   Call Outpatient Wound Center @ [phone]   Option 1: [place], [place], [place], [place]  Option 2: [facility], [place]      Discharge Instructions - Podiatry    Weight Bearing Status: Non-weight bearing to right lower extremity                   Pain: Continue analgesics as needed    Follow-up appointment instructions: Please make an appointment within one week of discharge with [facility]. Contact sooner if any increase in pain, or signs of infection occur    Wound Care: Leave dressings clean, dry, and intact between professional dressing changes    Nursing Instructions: Please apply adaptic, an absorptive dressing (maxorb) to right posterior ankle wound. Then cover with Gauze and secure with Kerlix and tape. Please change dressing 3x a week .

## 2024-03-13 NOTE — CASE MANAGEMENT
Met w/pt and reviewed team update and confirmed dc arrangements for tomorrow. Pt stated her daughter natanael took off from work and will be onsite at 11 for. Dc. Pt inquired if she was leaving with her medications. Cm asked which pharmacy she would like to use, [facility] onsite so she can take her medications right home, or her own pharmacy as she can pick them up on the way home. Pt wishes to use [facility]. PMR & IM made aware.

## 2024-03-13 NOTE — PROGRESS NOTES
"   03/13/24 1300   Pain Assessment   Pain Assessment Tool 0-10   Pain Score No Pain   Restrictions/Precautions   Precautions Bed/chair alarms;Cognitive;Fall Risk;Hard of hearing;Impulsive;Contact/isolation;Multiple lines;Pressure Ulcer;Supervision on toilet/commode   Weight Bearing Restrictions Yes   RLE Weight Bearing Per Order NWB   ROM Restrictions No   Braces or Orthoses Other (Comment)  (R prevalon boot)   Cognition   Overall Cognitive Status Impaired   Arousal/Participation Alert;Cooperative   Attention Attends with cues to redirect   Orientation Level Oriented X4   Memory Decreased short term memory   Following Commands Follows one step commands without difficulty   Roll Left and Right   Type of Assistance Needed Independent   Roll Left and Right CARE Score 6   Sit to Lying   Type of Assistance Needed Independent   Sit to Lying CARE Score 6   Lying to Sitting on Side of Bed   Type of Assistance Needed Independent   Lying to Sitting on Side of Bed CARE Score 6   Bed-Chair Transfer   Type of Assistance Needed Set-up / clean-up   Comment sit pivot EOB<>WC, WC<>NuStep   Chair/Bed-to-Chair Transfer CARE Score 5   Walk 10 Feet   Reason if not Attempted Safety concerns   Walk 10 Feet CARE Score 88   Walk 50 Feet with Two Turns   Reason if not Attempted Safety concerns   Walk 50 Feet with Two Turns CARE Score 88   Walk 150 Feet   Reason if not Attempted Safety concerns   Walk 150 Feet CARE Score 88   Walking 10 Feet on Uneven Surfaces   Reason if not Attempted Safety concerns   Walking 10 Feet on Uneven Surfaces CARE Score 88   Ambulation   Findings Pt declined ambulation this session due to shoulder pain. Pt states \"I am afraid my shoulder will give out.\" Ambulation is not the recommended mode of mobility when patient returns home due to high fall risk and inability to consistently maintain NWB on RLE   Wheel 50 Feet with Two Turns   Type of Assistance Needed Independent   Comment independent with WC mobility over " level sufaces   Wheel 50 Feet with Two Turns CARE Score 6   Wheel 150 Feet   Type of Assistance Needed Independent   Comment independent with WC mobility over level sufaces   Wheel 150 Feet CARE Score 6   Wheelchair mobility   Does the patient use a wheelchair? 1. Yes   Type of Wheelchair Used 1. Manual   Method Right upper extremity;Left upper extremity;Left lower extremity   Distance Level Surface (feet) 200 ft  (80 ft, 100 ft, 2x 52 ft. With longer distance WC mobility required intermittent rest breaks)   Distance Wheeled 3% Grade 30 ft  (x2 (ascend/descend curb))   Findings Independent with WC mobility over level surfaces. Pt was able to ascend first 15 ft of ramp with CS/CGA from therapist, but required Morris for second half of ramp management due to UE fatigue. Patient confirms that daughter can assist with ramp management at home.   Curb or Single Stair   Reason if not Attempted Safety concerns   1 Step (Curb) CARE Score 88   4 Steps   Reason if not Attempted Safety concerns   4 Steps CARE Score 88   12 Steps   Reason if not Attempted Safety concerns   12 Steps CARE Score 88   Picking Up Object   Comment independent with WC mobility over level sufaces   Reason if not Attempted Safety concerns   Picking Up Object CARE Score 88   Therapeutic Interventions   Other Manuevering WC around cones/obstacles x 40 ft, Independent able to perform   Equipment Use   NuStep Level 2 x 10 minutes with bilateral UE and LLE   Assessment   Treatment Assessment Patient participated in 60 minute skilled physical therapy focusing on transfers and WC mobility indoor and ramp management. Initial PT goals were set for independent status, but not progressed to independent due to inability to consistently maintain NWB status thus requiring set up/distant supervision. Patient discharging home tomorrow with  PT   Problem List Decreased strength;Decreased endurance;Impaired balance;Decreased mobility;Decreased cognition;Impaired  judgement;Decreased safety awareness;Impaired hearing;Decreased skin integrity;Orthopedic restrictions   Barriers to Discharge None   Plan   Treatment/Interventions Functional transfer training;LE strengthening/ROM;Therapeutic exercise;Endurance training;Patient/family training;Equipment eval/education;Gait training;Bed mobility;Compensatory technique education   Progress Progressing toward goals   Discharge Recommendation   Rehab Resource Intensity Level, PT   ( PT)   Equipment Recommended Wheelchair   PT Therapy Minutes   PT Time In 1300   PT Time Out 1400   PT Total Time (minutes) 60   PT Mode of treatment - Individual (minutes) 60   PT Mode of treatment - Concurrent (minutes) 0   PT Mode of treatment - Group (minutes) 0   PT Mode of treatment - Co-treat (minutes) 0   PT Mode of Treatment - Total time(minutes) 60 minutes   PT Cumulative Minutes 720   Therapy Time missed   Time missed? No     Elenita Miller, PT, DPT

## 2024-03-13 NOTE — PROGRESS NOTES
03/13/24 0930   Pain Assessment   Pain Assessment Tool 0-10   Pain Score No Pain   Restrictions/Precautions   Precautions Bed/chair alarms;Cognitive;Fall Risk;Hard of hearing;Impulsive;Contact/isolation;Multiple lines;Pressure Ulcer;Supervision on toilet/commode   Weight Bearing Restrictions Yes   RLE Weight Bearing Per Order NWB   ROM Restrictions No   Braces or Orthoses Other (Comment)  (R prevalon boot)   Cognition   Overall Cognitive Status Impaired   Arousal/Participation Alert;Cooperative   Attention Attends with cues to redirect   Orientation Level Oriented X4   Memory Decreased short term memory   Following Commands Follows one step commands without difficulty   Roll Left and Right   Type of Assistance Needed Independent   Roll Left and Right CARE Score 6   Sit to Lying   Type of Assistance Needed Independent   Sit to Lying CARE Score 6   Lying to Sitting on Side of Bed   Type of Assistance Needed Independent   Lying to Sitting on Side of Bed CARE Score 6   Sit to Stand   Type of Assistance Needed Physical assistance   Physical Assistance Level 25% or less   Comment mainly CGA but can require Morris at times with RW, VCs to maintain NWB   Sit to Stand CARE Score 3   Bed-Chair Transfer   Type of Assistance Needed Set-up / clean-up   Comment sit pivot from EOB<>WC, WC <>EOM, WC<>standard chair with armrest to mimic recliner at home   Chair/Bed-to-Chair Transfer CARE Score 5   Car Transfer   Type of Assistance Needed Incidental touching   Comment CGA/CS sit pivot transfer   Car Transfer CARE Score 4   Walk 50 Feet with Two Turns   Reason if not Attempted Safety concerns   Walk 50 Feet with Two Turns CARE Score 88   Walk 150 Feet   Reason if not Attempted Safety concerns   Walk 150 Feet CARE Score 88   Walking 10 Feet on Uneven Surfaces   Reason if not Attempted Safety concerns   Walking 10 Feet on Uneven Surfaces CARE Score 88   Wheel 50 Feet with Two Turns   Type of Assistance Needed Independent;Adaptive  equipment   Wheel 50 Feet with Two Turns CARE Score 6   Wheel 150 Feet   Type of Assistance Needed Independent;Adaptive equipment   Wheel 150 Feet CARE Score 6   Wheelchair mobility   Does the patient use a wheelchair? 1. Yes   Type of Wheelchair Used 1. Manual   Method Right upper extremity;Left upper extremity;Left lower extremity   Distance Level Surface (feet) 170 ft  (52 ft)   Curb or Single Stair   Reason if not Attempted Safety concerns   1 Step (Curb) CARE Score 88   4 Steps   Reason if not Attempted Safety concerns   4 Steps CARE Score 88   12 Steps   Reason if not Attempted Safety concerns   12 Steps CARE Score 88   Picking Up Object   Reason if not Attempted Safety concerns   Picking Up Object CARE Score 88   Therapeutic Interventions   Strengthening Access Code: [email or web address]  URL: [email or web address]  Date: 03/13/2024  Prepared by: Elenita Miller    Exercises  - Seated Long Arc Quad  - 1 x daily - 7 x weekly - 3 sets - 10 reps  - Seated March  - 1 x daily - 7 x weekly - 3 sets - 10 reps  - Seated Hip Abduction with Resistance  - 1 x daily - 7 x weekly - 3 sets - 10 reps  - Seated Hip Abduction with Resistance  - 1 x daily - 7 x weekly - 3 sets - 10 reps  - Seated Hip Adduction Isometrics with Ball  - 1 x daily - 7 x weekly - 3 sets - 10 reps  (For L knee extension 5# AW, R knee extension no AW 2/2 to skin graft, L hip flexion 5# AW, R hip flexion 5# AW on upper thigh, seated hamstring curl with RTB LLE 3x10 each LE)   Assessment   Treatment Assessment Patient participated in 60 minute skilled physical therapy session focusing on bed mobility, transfers, WC mobility, and seated therex. Patient discharging home tomoroow with family support and HH SN, PT, and OT services. Recommending patient perform only sit pivot transfers and stand pivot transfer with RW pt requires physical assist and she is high risk for falls. WC will be patient's primary means of mobility. Patient showed PT pictures of ramp  that was set up at home.   Problem List Decreased strength;Decreased endurance;Impaired balance;Decreased mobility;Decreased cognition;Impaired judgement;Decreased safety awareness;Impaired hearing;Decreased skin integrity;Orthopedic restrictions   Barriers to Discharge None   Plan   Treatment/Interventions Functional transfer training;LE strengthening/ROM;Therapeutic exercise;Endurance training;Patient/family training;Gait training;Bed mobility;Compensatory technique education   Progress Progressing toward goals   Discharge Recommendation   Rehab Resource Intensity Level, PT   ( PT)   Equipment Recommended Wheelchair   PT Therapy Minutes   PT Time In 0930   PT Time Out 1030   PT Total Time (minutes) 60   PT Mode of treatment - Individual (minutes) 60   PT Mode of treatment - Concurrent (minutes) 0   PT Mode of treatment - Group (minutes) 0   PT Mode of treatment - Co-treat (minutes) 0   PT Mode of Treatment - Total time(minutes) 60 minutes   PT Cumulative Minutes 660   Therapy Time missed   Time missed? No     Elenita Miller, PT, DPT

## 2024-03-13 NOTE — PROGRESS NOTES
03/13/24 0730   Pain Assessment   Pain Assessment Tool 0-10   Pain Score No Pain   Restrictions/Precautions   Precautions Bed/chair alarms;Cognitive;Fall Risk;Hard of hearing;Contact/isolation;Impulsive;Multiple lines;Pressure Ulcer;Supervision on toilet/commode   Weight Bearing Restrictions Yes   RLE Weight Bearing Per Order NWB   ROM Restrictions No   Braces or Orthoses   (R prevalon boot)   Eating   Type of Assistance Needed Independent   Physical Assistance Level No physical assistance   Comment seated   Eating CARE Score 6   Oral Hygiene   Type of Assistance Needed Independent   Physical Assistance Level No physical assistance   Comment seated in w/c at sink   Oral Hygiene CARE Score 6   Grooming   Able To Initiate Tasks;Comb/Brush Hair;Wash/Dry Face;Brush/Clean Teeth;Wash/Dry Hands   Limitation Noted In Strength;Timeliness   Findings Pt indep for grooming tasks while seated in w/c at sink   Shower/Bathe Self   Type of Assistance Needed Set-up / clean-up;Verbal cues   Physical Assistance Level No physical assistance   Comment Pt engaged in in sponge bath seated EOB to simulate home setup, able to wash all parts (except RLE due to being ace wrapped) with increased time and max vc's to ensure NWBing through RLE. Pt utilized dynamic reach to wash L lower leg and foot. Pt cont to bathe az/rear via alternating lateral w/s.   Shower/Bathe Self CARE Score 4   Bathing   Assessed Bath Style Sponge Bath   Anticipated D/C Bath Style Sponge Bath   Able to Gather/Transport No   Able to Adjust Water Temperature No   Able to Wash/Rinse/Dry (body part) Left Arm;Right Arm;L Upper Leg;R Upper Leg;L Lower Leg/Foot;Chest;Abdomen;Perineal Area;Buttocks   Limitations Noted in Coordination;Balance;Endurance;Safety;Strength;Timeliness   Positioning Seated   Tub/Shower Transfer   Reason Not Assessed Sponge Bath;Medical;Safety   Upper Body Dressing   Type of Assistance Needed Set-up / clean-up   Physical Assistance Level No  physical assistance   Comment seated   Upper Body Dressing CARE Score 5   Lower Body Dressing   Type of Assistance Needed Set-up / clean-up;Verbal cues   Physical Assistance Level No physical assistance   Comment seated EOB, pt able to thread underwear/shorts using dynamic reach then using alternating lateral w/s for CM over hips with increased time and max vc's to ensure adherence to NWBing RLE   Lower Body Dressing CARE Score 4   Putting On/Taking Off Footwear   Type of Assistance Needed Set-up / clean-up   Physical Assistance Level No physical assistance   Comment seated EOB to doff/don L sock and slip-on shoe, and R prevalon boot   Putting On/Taking Off Footwear CARE Score 5   Picking Up Object   Reason if not Attempted Safety concerns   Picking Up Object CARE Score 88   Dressing/Undressing Clothing   Remove UB Clothes Pullover Shirt   Don UB Clothes Pullover Shirt   Remove LB Clothes Shorts;Undergarment;Socks;Shoes   Don LB Clothes Shorts;Undergarment;Socks;Shoes   Limitations Noted In Balance;Coordination;Endurance;Safety;Strength;Timeliness   Positioning Sit Edge Of Bed   Roll Left and Right   Type of Assistance Needed Independent   Physical Assistance Level No physical assistance   Roll Left and Right CARE Score 6   Sit to Lying   Type of Assistance Needed Independent   Physical Assistance Level No physical assistance   Sit to Lying CARE Score 6   Lying to Sitting on Side of Bed   Type of Assistance Needed Independent   Physical Assistance Level No physical assistance   Lying to Sitting on Side of Bed CARE Score 6   Sit to Stand   Type of Assistance Needed Physical assistance;Verbal cues;Adaptive equipment   Physical Assistance Level 25% or less   Comment CGA-Morris w/RW and max vc's to maintain RLE NWB; not recommending upon D/C 2* pt P carryover of NWB precaution   Sit to Stand CARE Score 3   Bed-Chair Transfer   Type of Assistance Needed Set-up / clean-up   Physical Assistance Level No physical assistance  "  Comment S/U A sit pivot xfers to w/c, bed, and commode, w/increased time   Chair/Bed-to-Chair Transfer CARE Score 5   Toileting Hygiene   Type of Assistance Needed Independent   Physical Assistance Level No physical assistance   Comment IND for toileting hygiene/CM as long as pt wearing dress and no underwear, as pt has demonstrated that she can't complete CM safely while maintaining RLE NWB restriction   Toileting Hygiene CARE Score 6   Toilet Transfer   Type of Assistance Needed Set-up / clean-up   Physical Assistance Level No physical assistance   Comment S/U A sit pivot xfer only, to DABSC   Toilet Transfer CARE Score 5   Exercise Tools   Exercise Tools Yes   Other Exercise Tool 1 Seated w/Sup, [id number] each of alternating UE bicep curls, chest press, and shoulder flexion, using 3# DB for curls and 2# DB for all other planes. Focus was on increasing BUE strength for increased independence w/fxl transfers. Pt tolerated well with rest breaks between sets to manage min fatigue and c/o increased R shoulder weakness 2* h/o injury.   Cognition   Overall Cognitive Status Impaired   Arousal/Participation Alert;Cooperative   Attention Attends with cues to redirect   Orientation Level Oriented X4   Memory Decreased short term memory;Decreased recall of precautions   Following Commands Follows one step commands without difficulty   Activity Tolerance   Activity Tolerance Patient tolerated treatment well   Medical Staff Made Aware notified kathe Smith that while pt was washing LUE with washcloth, she opened up a previously scabbed-over skin tear, with very minimal bleeding present. Nsg stated they would clean this wound after OT session ended. Also notified nsg that pt found a pink pill on the floor of her room, with pt unaware which medication this was or when she dropped the pill but believe it was from \"a couple days ago when I was taking my meds\"   Assessment   Treatment Assessment Pt seen for 60min skilled OT session " "focused on D/C ADL routine (EOB sponge bath), fxl sit-pivot transfer training, carryover of RLE NWB precaution, and UE strengthening, for increased independence w/ADLs/IADLs and decreased caregiver burden. See detailed descriptions of fxl performance above. Pt tolerated session, although initially required vc's for encouragement to engaged in OT session upon therapist arrival, as pt reported max generalized fatigue and still feeling \"sleepy because I just woke up.\" Pt cont to require max vc's to maintain RLE NWB status during transfers and fxl ADL activities EOB, with pt cont to be impulsive at times with attempting to WB through RLE. Recommending S/U A for sit-pivot transfers to w/c, bed, and commode at home, because without mass cues pt continues to demonstrate that she does not maintain NWB through RLE during xfers and LB dressing. Pt Indep for toileting at home as long as she's wearing a dress and no underwear, as pt has demonstrated inability to complete CM while maintaining Wbing status. These recommendations have been communicated to pt and pt's daughter, with all in agreement. Pt anticipated to D/C tomorrow home w/ home OT. Pt states she has no further questions/concerns for OT team.   Problem List Decreased strength;Decreased endurance;Impaired balance;Decreased mobility;Decreased cognition;Impaired judgement;Decreased safety awareness;Impaired hearing;Orthopedic restrictions;Decreased skin integrity   Discharge Recommendation   Rehab Resource Intensity Level, OT   (home w/home OT)   OT Therapy Minutes   OT Time In 0730   OT Time Out 0830   OT Total Time (minutes) 60   OT Mode of treatment - Individual (minutes) 60   OT Mode of treatment - Concurrent (minutes) 0   OT Mode of treatment - Group (minutes) 0   OT Mode of treatment - Co-treat (minutes) 0   OT Mode of Treatment - Total time(minutes) 60 minutes   OT Cumulative Minutes 540   Therapy Time missed   Time missed? No     "

## 2024-03-13 NOTE — PROGRESS NOTES
[facility]  Progress Note  Name: Lona Cedeno I  MRN: [id number]  Unit/Bed#: -01 I Date of Admission: 3/5/2024   Date of Service: 3/13/2024 I Hospital Day: 8    Assessment/Plan   Diabetic foot ulcer with osteomyelitis (HCC)  Assessment & Plan  Lab Results   Component Value Date    HGBA1C 6.0 (H) 02/28/2024       Recent Labs     03/12/24  0620 03/12/24  1047 03/12/24  1557 03/13/24  0625   POCGLU 124 82 120 125         Blood Sugar Average: Last 72 hrs:  (P) 128.7837913348142172  S/p medial ulcer debridement/partial second toe amputation 2/4/2024  Recent STSG on 2/28/2024 with VAC removal 3/4/2024  Continue local care; refer to images  NWB  podiatry following for management  PMR managing therapy/pain      * Surgical site infection  Assessment & Plan  S/p right axilla/femoral bypass 1/31/2024  Postop infection s/p washout/debridement 2/14 and 2/19  VAC removed 2/28/2024  Continue oral antibiotics through 3/22/2024  Cultures positive Klebsiella/Proteus  Continue local dressing changes  F/u vascular surgery scheduled  Continue xarelto  Refer to media    PAD (peripheral artery disease) ([facility])  Assessment & Plan  S/p right axillo-femoral bypass 1/31/2024  Continue aspirin/statin/low-dose Xarelto    Essential hypertension  Assessment & Plan  Was taking Lisinopril 5mg daily and amiloride 5mg daily prior to admission.  Added back lisinopril for renal protection  Hydralazine as needed is ordered however has not required any doses    Type 2 diabetes mellitus with diabetic polyneuropathy (HCC)  Assessment & Plan  Lab Results   Component Value Date    HGBA1C 6.0 (H) 02/28/2024       Recent Labs     03/12/24  0620 03/12/24  1047 03/12/24  1557 03/13/24  0625   POCGLU 124 82 120 125         Blood Sugar Average: Last 72 hrs:  (P) 128.4555792202358753  Home: Takes metformin 1000 mg twice daily/Januvia 100 mg at lunchtime  Here: metformin 500mg bid  Will dc home on metformin 1g bid and  test BS  Resume januvia if BS remain >150    Tobacco use disorder  Assessment & Plan  Continue nicotine patch  Recommend cessation                 The above assessment and plan was reviewed and updated as determined by my evaluation of the patient on 3/13/2024.    Labs:   Results from last 7 days   Lab Units 03/11/24  0559   WBC Thousand/uL 7.23   HEMOGLOBIN g/dL 12.1   HEMATOCRIT % 39.0   PLATELETS Thousands/uL 318     Results from last 7 days   Lab Units 03/11/24  0559   SODIUM mmol/L 141   POTASSIUM mmol/L 4.0   CHLORIDE mmol/L 102   CO2 mmol/L 30   BUN mg/dL 17   CREATININE mg/dL 0.62   CALCIUM mg/dL 9.8             Results from last 7 days   Lab Units 03/13/24  0625 03/12/24  1557 03/12/24  1047   POC GLUCOSE mg/dl 125 120 82       Imaging  No orders to display       Review of Scheduled Meds:  Current Facility-Administered Medications   Medication Dose Route Frequency Provider Last Rate    acetaminophen  975 mg Oral Q8H JILLIAN Vanesa Sheehan, CRNP      albuterol  2 puff Inhalation Q6H PRN Vanesa Sheehan, CRNP      Apoaequorin  20 mg Oral Daily Denisse Berger PA-C      aspirin  81 mg Oral Daily Vanesa Sheehan, CRNP      atorvastatin  10 mg Oral Daily With Dinner Denisse Berger PA-C      buPROPion  300 mg Oral Daily Vanesa Sheehan, CRNP      cefpodoxime  400 mg Oral BID With Meals Vanesa Sheehan, CRNP      clonazePAM  1 mg Oral QPM Vanesa Sheehan, CRNP      cyanocobalamin  1,000 mcg Oral Daily Vanesa Sheehan, CRNP      fish oil  1,000 mg Oral Daily Vanesa Sheehan, CRNP      fluticasone-vilanterol  1 puff Inhalation Daily Vanesa Sheehan, CRNP      hydrALAZINE  25 mg Oral Q8H PRN Vanesa Sheehan, CRNP      lisinopril  2.5 mg Oral Daily Vanesa Sheehan, CRNP      melatonin  6 mg Oral HS PRN Alan England MD      metFORMIN  500 mg Oral BID With Meals Vanesa Sheehan, CRNP      methocarbamol  250 mg Oral Q8H JILLIAN Vanesa Sheehan, CRNP      multivitamin-minerals  1 tablet Oral Daily Vanesa Sheehan, CRNP      nicotine  1 patch Transdermal  Daily Vanesa Sheehan, CRNP      oxyCODONE  2.5 mg Oral Q4H PRN Vanesa Sheehan, CRNP      Or    oxyCODONE  5 mg Oral Q4H PRN Vanesa Sheehan, CRNP      pantoprazole  40 mg Oral Early Morning Vanesa Sheehan, CRNP      polyethylene glycol  17 g Oral Daily Vanesa Sheehan, CRNP      pregabalin  200 mg Oral TID Vanesa Sheehan, CRNP      rivaroxaban  2.5 mg Oral BID Vanesa Sheehan, CRNP      senna-docusate sodium  1 tablet Oral BID Vanesa Sheehan, CRNP         Subjective/ HPI: Patient seen and examined. Patients overnight issues or events were reviewed with nursing staff. New or overnight issues include the following:     Pt seen at bedside. She is anxious for dc tomorrow.     ROS:   A 10 point ROS was performed; negative except as noted above.        *Labs /Radiology studies Reviewed  *Medications  reviewed and reconciled as needed  *Please refer to order section for additional ordered labs studies      Physical Examination:  Vitals:   Vitals:    03/12/24 0952 03/12/24 1344 03/12/24 2143 03/13/24 0626   BP: 149/64 105/58 116/59 136/64   BP Location:  Right arm Left arm Left arm   Pulse:  78 88 75   Resp:  16 16 17   Temp:  98.2 °F (36.8 °C) 98.1 °F (36.7 °C) 97.7 °F (36.5 °C)   TempSrc:  Oral Oral Oral   SpO2:  94% 98% 96%   Weight:    69 kg (152 lb 1.9 oz)       General Appearance: NAD; pleasant  HEENT: PERRLA, conjuctiva normal; mucous membranes moist; face symmetrical  Neck:  Supple  Lungs: clear bilaterally, normal respiratory effort, no retractions, expiratory effort normal, on room air  CV: regular rate and rhythm, no murmurs rubs or gallops noted   ABD: soft non tender, +BS x4  EXT: DP pulses intact, no lymphadenopathy, no edema  Skin: normal turgor, normal texture, no rash  Psych: affect normal, mood normal  Neuro: AAOx3       The above physical exam was reviewed and updated as determined by my evaluation of the patient on 3/13/2024.    Invasive Devices       Drain  Duration             Ureteral Internal Stent Left ureter 6 Fr.  113 days                       VTE Pharmacologic Prophylaxis: Xarelto  Code Status: Level 3 - DNAR and DNI  Current Length of Stay: 8 day(s)    Total floor / unit time spent today 30 minutes  Coordination of patient's care was performed in conjunction with primary service. Time invested included review of patient's labs, vitals, and management of their comorbidities with continued monitoring, examination of patient as well as answering patient questions, documenting her findings and creating progress note in electronic medical record,  ordering appropriate diagnostic testing.       ** Please Note:  voice to text software may have been used in the creation of this document. Although proof errors in transcription or interpretation are a potential of such software**

## 2024-03-13 NOTE — WOUND OSTOMY CARE
Progress Note - Wound   Lona Cedeno 78 y.o. female MRN: [id number]  Unit/Bed#: -01 Encounter: [id number]        Assessment:   Patient is seen for wound care follow-up. Patient seen sitting at edge of bed on regular mattress. Min assist for turning and repositioning. Patient reports continence of bowel and bladder. Independent for meals. Agreeable for assessment.     Right Lower Extremity: Managed per Podiatry recommendations. Dressing is clean, dry, and intact.     Findings:  Left heel is dry, intact, and pink in color- tissue blanches. Recommend continuing with silicone bordered foam dressing to area.       POA Mid Sacrum Unstageable Pressure Injury: irregular in shape area of full thickness skin loss. Measures smaller on assessment. Wound bed is 100% yellow slough tissue- cannot appreciate true depth related to slough tissue obscuring wound base.  Ani-wound is fragile and hyperpigmented. . Scant serosanguineous drainage noted. Recommend continuing with triad paste and silicone bordered foam dressing.  Right Groin Wound: previous incision site- irregular in shape area of full thickness skin loss. wound measures significantly smaller in size on assessment today.  Wound bed is mix of beefy red granulation tissue and yellow slough tissue. Ani-wound is fragile. Small to Moderate amount of serosanguineous drainage. Recommend continuing with silver alginate and foam dressing     Patient agreeable to ambulatory referral to outpatient wound center for the above wounds. Referral placed.     No induration, fluctuance, odor, warmth/temperature differences, redness, or purulence noted to the above noted wounds and skin areas assessed. New dressings applied per orders listed below. Patient tolerated well- no s/s of non-verbal pain or discomfort observed during the encounter. Bedside nurse aware of plan of care. See flow sheets for more detailed assessment findings.      Orders listed below and wound care will  continue to follow, call or tiger text with questions.       Skin Care Plan:  1-Right Groin Wound: Cleanse wound with NSS and pat dry. Fluff wound bed with melgisorb Ag and cover with a silicone bordered foam dressing. Gibson with T for Treatment and change daily or PRN soilage/displacement.   2-Turn/reposition q2h or when medically stable for pressure re-distribution on skin .  3-Elevate heels to offload pressure.  4-Moisturize skin daily with skin nourishing cream  5-Ehob cushion in chair when out of bed.  6-RLE and Right Foot: per podiatry recommendations.   7-Left Heel: Apply Silicone Border Foam (Mepilex) to areas. Gibson with P for Prevention and change every 3 days or PRN soilage/displacement. Peel back and inspect Q-shift.  8-Mid Sacrum Wound: Cleanse with soap and water. Pat dry. Apply triad paste to mid sacrum wound and cover with a Silicone Bordered Foam Dressing. Gibson with T for Treatment and change every day or PRN soilage/displacement   9-Ambulatory referral to outpatient wound center for continued treatment of sacro-buttocks and right groin wounds. Referral placed.       WOUNDS:  Wound 02/14/24 Groin Right (Active)   Wound Image   03/13/24 0736   Wound Description Beefy red;Granulation tissue;Yellow;Slough 03/13/24 1100   Ani-wound Assessment Fragile 03/13/24 1100   Wound Length (cm) 5 cm 03/13/24 0736   Wound Width (cm) 1 cm 03/13/24 0736   Wound Depth (cm) 0.4 cm 03/13/24 0736   Wound Surface Area (cm^2) 5 cm^2 03/13/24 0736   Wound Volume (cm^3) 2 cm^3 03/13/24 0736   Calculated Wound Volume (cm^3) 2 cm^3 03/13/24 0736   Change in Wound Size % 80.95 03/13/24 0736   Drainage Amount Small 03/13/24 1100   Drainage Description Serosanguineous;Yellow 03/13/24 1100   Non-staged Wound Description Full thickness 03/13/24 1100   Treatments Cleansed;Site care;Irrigation with NSS 03/13/24 1100   Dressing Calcium Alginate with Silver;Foam, Silicon (eg. Allevyn, etc) 03/13/24 1100   Wound packed? No 03/13/24 1100    Packing- # removed 0 03/13/24 1100   Packing- # inserted 0 03/13/24 1100   Dressing Changed Changed 03/13/24 1100   Patient Tolerance Tolerated well 03/13/24 1100   Dressing Status Clean;Dry;Intact 03/13/24 1100       Wound 02/23/24 Pressure Injury Coccyx Mid (Active)   Wound Image   03/13/24 0739   Wound Description Yellow;Slough 03/13/24 1100   Pressure Injury Stage U 03/13/24 1100   Ani-wound Assessment Fragile 03/13/24 1100   Wound Length (cm) 0.5 cm 03/13/24 0739   Wound Width (cm) 0.2 cm 03/13/24 0739   Wound Depth (cm) 0.2 cm 03/13/24 0739   Wound Surface Area (cm^2) 0.1 cm^2 03/13/24 0739   Wound Volume (cm^3) 0.02 cm^3 03/13/24 0739   Calculated Wound Volume (cm^3) 0.02 cm^3 03/13/24 0739   Change in Wound Size % 75 03/13/24 0739   Drainage Amount Scant 03/13/24 1100   Drainage Description Serosanguineous 03/13/24 1100   Non-staged Wound Description Full thickness 03/13/24 1100   Treatments Cleansed;Irrigation with NSS;Site care 03/13/24 1100   Dressing Other (Comment);Foam, Silicon (eg. Allevyn, etc) 03/13/24 1100   Dressing Changed Changed 03/13/24 1100   Patient Tolerance Tolerated well 03/13/24 1100   Dressing Status Clean;Intact;Dry 03/13/24 1100                Sandra Vo RN, BSN, CWOCN

## 2024-03-13 NOTE — TEAM CONFERENCE
Acute RehabilitationTeam Conference Note  Date: 3/13/2024   Time: 11:48 AM       Patient Name:  Lona Cedeno       Medical Record Number: [id number]   YOB: 1945  Sex: Female          Room/Bed:  [facility]4/[facility] 974-01  Payor Info:  Payor: [employer]  REP / Plan: [facility] [employer] [facility] REP / Product Type: Medicare PPO /      Admitting Diagnosis: Diabetic foot ulcer with osteomyelitis (HCC) [E11.621, E11.69, L97.509, M86.9]   Admit Date/Time:  3/5/2024  2:03 PM  Admission Comments: No comment available     Primary Diagnosis:  Surgical site infection  Principal Problem: Surgical site infection    Patient Active Problem List    Diagnosis Date Noted    Sacral wound 02/23/2024    Impulsive 02/23/2024    Other dietary vitamin B12 deficiency anemia 02/16/2024    Leukocytosis 02/13/2024    Surgical site infection 02/13/2024    Sepsis without acute organ dysfunction (HCC) 02/13/2024    At risk for venous thromboembolism (VTE) 02/09/2024    At high risk for skin breakdown 02/09/2024    Wound of skin 02/09/2024    Impaired mobility and activities of daily living 02/09/2024    Acute pain due to injury 02/09/2024    Hematoma 02/06/2024    Anemia 02/06/2024    Constipation 01/26/2024    Preoperative cardiovascular examination 01/25/2024    Diabetic foot ulcer with osteomyelitis (HCC) 01/23/2024    Ankle ulcer, right, with fat layer exposed (HCC) 12/14/2023    Age-related osteoporosis without current pathological fracture 11/28/2023    Abnormal CT of the chest 10/17/2023    COPD, severity to be determined (HCC) 10/17/2023    Tobacco use disorder 10/17/2023    PAD (peripheral artery disease) (HCC) 10/10/2023    Bladder neoplasm 09/11/2023    Left renal mass 09/11/2023    Lactic acidosis 08/11/2023    Hypomagnesemia 08/11/2023    Degenerative lumbar disc 05/24/2023    Urinary incontinence 04/13/2023    GERD without esophagitis 10/16/2019    Essential hypertension 02/15/2019    Anxiety and depression 02/15/2019    Type 2  diabetes mellitus with diabetic polyneuropathy (HCC) 02/12/2019       Physical Therapy:    Weight Bearing Status: Non-weight bearing (RLE)  Transfers:  (Set Up assistance for sit pivot transfers)  Bed Mobility: Independent  Amulation Distance (ft): 10 feet  Ambulation: Minimal Assistance  Assistive Device for Ambulation: Roller Walker  Wheelchair Mobility: Independent  Assistive Device for Stairs:  (family installed ramp)  Ramp: Minimal Assistance  Assistive Device for Ramp: Wheelchair  Discharge Recommendations: Home with:  DC Home with:: Family Support, First Floor Setup    Lona Hastings is independent with bed mobility, set up assistance for transfers, and independent with WC mobility on even surfaces. Patient was not progressed to independent level for transfers or given in room privileges as she requires mod-max verbal cues at times to maintain NWB on RLE when performing toileting hygiene and  lower body dressing. Trialed SPT transfers and short distance ambulation with RW during her stay but pt unable to consistently maintain NWB status and is high risk for falls due to impulsivity with RW. Patient to be WC level at home. Pt verbalized understanding that she is to not use RW at home. Pt will need assistance to manage WC ramp at home which family is able to provide. Patient to be discharged home on 3/14 with family support and  SN, PT, and OT services.     Occupational Therapy:  Eating: Independent  Grooming: Supervision  Bathing: Supervision  Bathing: Supervision  Upper Body Dressing: Supervision  Lower Body Dressing: Supervision  Toileting: Supervision  Toilet Transfer: Supervision  Cognition: Exceptions to WNL  Cognition: Decreased Memory, Decreased Executive Functions, Decreased Safety, Impulsive  Orientation: Person, Place, Time, Situation  Discharge Recommendations: Home with:  DC Home with:: Family Support, First Floor Setup, Home Occupational Therapy       OT eval to be completed 03/06/24      Pt is  demonstrating fair progress with occupational therapy and is progressing toward long term goals for ADL, IADL, and functional transfers/mobility. Pts long term goals for ADLs are set up-Independent with wheelchair. Pt continues to present with impairments in activity tolerance, endurance, standing balance/tolerance, UE strength, UE ROM, memory, insight, safety, and attention. Occupational performance remains limited by fatigue, orthopedic restricitions , WBS , impulsivity, decreased caregiver support, and risk for falls. Family training/education will be required prior to D/C. Pt will continue to benefit from skilled acute rehab OT services to address above mentioned barriers and maximize functional independence in baseline areas of occupation to meet established treatment goals with overall decreased burden of care. Plan of care to continue to focus on ADL Retraining , Kitchen Mobilty, Meal preparation, Medication management, Functional Transfers, Functional Cognition, DME training/education, and Family training/education. Goals for the upcoming week are: progress pt to overall supervision/set up for ADLs/sit pivots. Completed family training with pt's daughter via phone call. Provided update on CLOF, home safety recommendations. Daughter to purchase 4x a day pill organizer.     Anticipate d/c Thursday 3/14/24 with recommendation of home OT services.       Speech Therapy:           No notes on file    Nursing Notes:  Appetite: Good  Diet Type: Diabetic                      Diet Patient/Family Education Complete: No    Type of Wound (LDA): Wound                    Type of Wound Patient/Family Education: No  Bladder:  (Continent)     Bladder Patient/Family Education: Yes  Bowel:  (Continent)     Bowel Patient/Family Education: Yes     Pain Score: 0                          Pain Patient/Family Education: Yes  Medication Management/Safety  Safe Administration: Yes  Medication Patient/Family Education Complete: No      Pt has been re-admitted to [facility] S/P STSG of R ankle wound. Diabetic foot ulcer with osteomyelitis- Right posterior ankle ulcer with exposed Achilles tendon s/p wound debridement on 2/4/24 Osteomyelitis of right second toe s/p amputation on 2/4/24  S/p STSG application on 2/28/24 by Podiatry Podiatry removed RLE wound VAC 3/4/24- now w local wound care.  Surgical site infection-  Hospitalized from 2/13/24 to 2/23/24 for right groin surgical wound infection from previous RLE bypass S/p debridement on 2/14/2024 and washout on 2/19/2024 Wound cultures - Klebsiella and Proteus S/p removal of vac by vascular surgery on 2/28- cont local wound care.    ID recommended continuation of antibiotics through 3/22/2024 due to proximity of wound infection to the bypass graft - on Cefpodoxime 400 mg twice daily Sacral wound- Noted to have unstageable wound on the sacrum on initial admission Continue wound care.   Type 2 diabetes mellitus-Continue SSI and DM diet.   Home: Takes metformin 1000 mg twice daily/Januvia 100 mg at lunchtime PAD-Continue ASA, statin, Xarelto. COPD-Not in exacerbation Home Advair replaced by equivalent formulary Breo Continue albuterol as needed Anxiety and depression- Continue Wellbutrin  mg daily and Clonazepam 1 mg at bedtime GERD-Continue PPI. Pain control with scheduled tylenol,robaxin,lyrica and prn oxycodone.  Pt is continent of bowel and bladder.  Pt requires alarms for safety.        This week we will encourage independence with ADLs.  We will monitor labs and vital signs.  We will educate pt/family about repositioning to prevent skin breakdown.  We will assist w repositioning and perform routine skin checks.  We will monitor for adequate pain control.  We will monitor for constipation and medicate pt as ordered.  We will increase safety awareness and keep pt free from falls. We will continue education on wound care and how to monitor for s/s of infection.                  Case Management:      Discharge Planning  Living Arrangements: Lives w/ Spouse/significant other, Lives w/ Children  Support Systems: Spouse/significant other, Daughter  Assistance Needed: TBD  Type of Current Residence: Private residence  Current Home Care Services: No  Pt is making good progress and scheduled to return home on Thursday. Arrangements in place for continued nursing and pt and ot at home through [facility]. Wheelchair ordered for pts functional safety at home. Following for additional dc need.s     Is the patient actively participating in therapies? yes  List any modifications to the treatment plan:     Barriers Interventions   All functional barriers resolved                      Is the patient making expected progress toward goals? yes  List any update or changes to goals:     Medical Goals: Patient will be medically stable for discharge to [facility] restrictive envrionment upon completion of rehab program and Patient will be able to manage medical conditions and comorbid conditions with medications and follow up upon completion of rehab program    Weekly Team Goals:   Rehab Team Goals  ADL Team Goal: Patient will be independent with ADLs with least restrictive device upon completion of rehab program (7-10 days)  Transfer Team Goal: Patient will be independent with transfers with least restrictive device upon completion of rehab program  Locomotion Team Goal: Patient will be independent with locomotion with least restrictive device upon completion of rehab program    Discussion: pt has made good progress and has achieved goals for dc tomorrow. Pt is set up assist with transfers, independent w/c mobility. Pt needs cueing to maintain nwb on her le. Family to assist with toileting and other adls as needed. Pt will be continuing with [facility] for rn pt and ot at home.     Anticipated Discharge Date:  3/14/24  [facility] Team Members Present:The following team members are supervising care for this patient and were present during this  Weekly Team Conference.    Physician: Dr. DePadua, MD  : Marilin Guzmán MSW  Registered Nurse: Jayde Esteves RN  Physical Therapist: Elenita Miller DPT  Occupational Therapist: Kiah Wolff MS, OTR/L, CBIS  Speech Therapist:

## 2024-03-13 NOTE — NURSING NOTE
"Patient refused night time blood sugar check. Patient states, \"The doctor told me he was stopping that.\" Patient insists blood sugar does not have to be checked.   "

## 2024-03-14 VITALS
OXYGEN SATURATION: 93 % | SYSTOLIC BLOOD PRESSURE: 104 MMHG | WEIGHT: 152.12 LBS | BODY MASS INDEX: 28.74 KG/M2 | TEMPERATURE: 98.2 F | HEART RATE: 72 BPM | DIASTOLIC BLOOD PRESSURE: 56 MMHG | RESPIRATION RATE: 18 BRPM

## 2024-03-14 LAB

## 2024-03-14 PROCEDURE — NC001 PR NO CHARGE: Performed by: PODIATRIST

## 2024-03-14 PROCEDURE — 82948 REAGENT STRIP/BLOOD GLUCOSE: CPT

## 2024-03-14 PROCEDURE — 99232 SBSQ HOSP IP/OBS MODERATE 35: CPT | Performed by: PHYSICAL MEDICINE & REHABILITATION

## 2024-03-14 RX ADMIN — FLUTICASONE FUROATE AND VILANTEROL TRIFENATATE 1 PUFF: 200; 25 POWDER RESPIRATORY (INHALATION) at 07:30

## 2024-03-14 RX ADMIN — METFORMIN HYDROCHLORIDE 500 MG: 500 TABLET, FILM COATED ORAL at 07:39

## 2024-03-14 RX ADMIN — RIVAROXABAN 2.5 MG: 2.5 TABLET, FILM COATED ORAL at 07:31

## 2024-03-14 RX ADMIN — CYANOCOBALAMIN TAB 500 MCG 1000 MCG: 500 TAB at 07:39

## 2024-03-14 RX ADMIN — OMEGA-3 FATTY ACIDS CAP 1000 MG 1000 MG: 1000 CAP at 07:29

## 2024-03-14 RX ADMIN — PREGABALIN 200 MG: 100 CAPSULE ORAL at 07:38

## 2024-03-14 RX ADMIN — Medication 1 TABLET: at 07:37

## 2024-03-14 RX ADMIN — SENNOSIDES, DOCUSATE SODIUM 1 TABLET: 8.6; 5 TABLET ORAL at 07:38

## 2024-03-14 RX ADMIN — Medication 20 MG: at 07:27

## 2024-03-14 RX ADMIN — BUPROPION HYDROCHLORIDE 300 MG: 150 TABLET, FILM COATED, EXTENDED RELEASE ORAL at 07:28

## 2024-03-14 RX ADMIN — METHOCARBAMOL 250 MG: 500 TABLET ORAL at 05:48

## 2024-03-14 RX ADMIN — ACETAMINOPHEN 975 MG: 325 TABLET, FILM COATED ORAL at 05:48

## 2024-03-14 RX ADMIN — ASPIRIN 81 MG CHEWABLE TABLET 81 MG: 81 TABLET CHEWABLE at 07:38

## 2024-03-14 RX ADMIN — NICOTINE 1 PATCH: 14 PATCH, EXTENDED RELEASE TRANSDERMAL at 07:36

## 2024-03-14 RX ADMIN — PANTOPRAZOLE SODIUM 40 MG: 40 TABLET, DELAYED RELEASE ORAL at 05:49

## 2024-03-14 RX ADMIN — POLYETHYLENE GLYCOL 3350 17 G: 17 POWDER, FOR SOLUTION ORAL at 07:37

## 2024-03-14 NOTE — NURSING NOTE
Discharge instructions reviewed with pt and daughter.  Verbalized understanding and all questions answered.  Belongings packed and assisted pt down to car.  PCA assisted pt into vehicle.  Pt in stable condition upon discharge

## 2024-03-14 NOTE — PROGRESS NOTES
Physical Medicine and Rehabilitation Progress Note  Lona Cedeno 78 y.o. female MRN: [id number]  Unit/Bed#: [facility] 974-01 Encounter: [id number]    To Review: Lona Cedeno is a 78 y.o. female with history of anxiety, closed fracture of the coccyx in May 2023, diabetes, GERD, hyperlipidemia, hypertension, IBS who presented to the [facility] on 1/23 from the podiatry office with a diabetic ulcer of the right toe and right ankle with exposed Achilles tendon.  She was stabbed have osteomyelitis of the right foot transferred to [facility] to reconsider vascularization and surgical options.  She is recommended to undergo an extra-anatomic bypass.  There was discussion about right transtibial amputation however decision was made for attempted limb salvage.  She underwent the above bypass on 1/31/2024 as well as on 2/4/2024 and underwent right toe and heel wound debridement with cycle and partial amputation of the toe and VAC placement.  Her course was complicated by arrest chest wall hematoma s/p evacuation on 2/4 with vascular.  On 2/13 the patient needed discharge back to acute care in the setting of fevers and leukocytosis from a potential infectious standpoint.  She was also given a discharge from the [facility] to acute care setting after podiatry wanted to take the patient back for split thickness skin grafting which occurred on 2/28 with Dr. Yeboah.  Patient was transition to oral antibiotics and wound vacs have been removed.  She remains nonweightbearing to the right lower extremity. The patient was evaluated by the Rehabilitation team and deemed an appropriate candidate for comprehensive inpatient rehabilitation and admitted to the [facility] on 3/5/2024  2:03 PM     Chief Complaint: No new concerns, excited for discharge tomorrow.     Interval History/Subjective:  No major events overnight. Last BM 3/12. No new pain, CP, SOB, fevers, chills, N/V, abdominal pain. Last BM 3/12.      ROS:  A 10 point review of systems was negative except for what is noted in the HPI.    Today's Changes:  Reviewed discharge medications with IM.  Can stop pain medication and melatonin PRN - has not used these medications  Team Conference today. See dispo below and separate conference note for more details.     Total time spent:  50 minutes, with more than 50% spent counseling/coordinating care. Counseling includes discussion with patient re: progress in therapies, functional issues observed by therapy staff, and discussion with patient his/her current medical state/wellbeing. Coordination of patient's care was performed in conjunction with Internal Medicine service to monitor patient's labs, vitals, and management of their comorbidities. In addition, this patient was discussed by the interdisciplinary team in weekly case conference today. The care of the patient was extensively discussed with all care providers and an appropriate rehabilitation plan was formulated unique for this patient. Barriers were identified preventing progression of therapy and appropriate interventions were discussed with each discipline. Please see the team note for input from all disciplines regarding barriers, intervention, and discharge planning.      Assessment/Plan:    * Surgical site infection  Assessment & Plan  Needed treatment with cefepime for surgical site infection however also on vancomycin which was discontinued on 2/16  Right groin breakdown status post wound washout with VAC placement on 2/14 as well as 2/19  Was followed by infectious disease and continued on cefepime with plan to transition to cefpodoxime 400 mg twice daily for 4 weeks.  Cultures were positive for Klebsiella and Proteus  VAC to the right groin as well as the foot have since been discontinued and continues for cefpodoxime for antibiotic treatment  Maintain nonweightbearing to the right lower extremity  Underwent split thickness skin grafting with  podiatry on 2/28 for the right foot  Due to the surgical site infection, sepsis and requirement for multiple trips to the OR for wound debridement, VAC placement, split-thickness grafting patient is not at her functional baseline and would benefit from daily physician oversight and rehabilitation nursing as well as aggressive physical and occupational therapy under guidance of physical medicine and rehabilitation specialist.  Patient will tolerate this 3 hours/day 5 to 7 days/week with ultimate discharge goal for the community    Impulsive  Assessment & Plan  Had impulsive behaviors during prior admissions and discussed overall behavioral plan.  Patient on last admission was impulsive and not using call bell appropriately as well as not following weightbearing restrictions  Discussed with patient again however continued vigilance with nursing and therapy checks, alarms and relatively sensitive settings and would benefit from Q 2-hour bowel bladder checks    Sacral wound  Assessment & Plan  Unstageable wound on the sacrum on initial evaluations, POA  Consult wound care for management  Has gotten smaller/improved during this stay  Still unstageable  Vigilant turns in bed and weight shifts, daily sacral checks by nursing  Will need outpatient home nursing for wound care, and also will need outpatient follow-up with wound care center to be arranged by wound care team.     Impaired mobility and activities of daily living  Assessment & Plan  - Rehabilitation medicine physician for daily monitoring of care, 24 hour availability for acute  medical issues, medication management, and therapeutic and diagnostic assessments.  - 24 hour rehabilitation nursing 7 days per week for: management/teaching of medications,  bowel/bladder routine, skin care.  - PT, OT for 2-3 hours per day, 5 to 6 days per week; 15 hours per week  - Rehabilitation Psychology for adjustment and coping  - MSW for barriers to discharge, community resources,  and family support  - Discharge planning following to help ensure a safe and efficient discharge    Diabetic foot ulcer with osteomyelitis ([facility])  Assessment & Plan  Patient presented with right posterior ankle ulceration with exposed Achilles tendon and required foot debridement on 2/4/2024 with VAC placement with right medial ankle ulcer, right second toe osteomyelitis status post right second toe amputation  Wound vacs have since been discontinued and continues to be followed by podiatry  Require going back to the OR on 2/28 for split-thickness grafting  Remains nonweightbearing to the right lower extremity  Outpatient f/u with podiatry      Acute pain due to injury  Assessment & Plan  Acetaminophen 975 mg every 8 hours  Lyrica 200 mg 3 times daily  Robaxin 250 mg every 8 hours - would make PRN at discharge  Oxycodone 2.5-5 mg every 4 hours as needed - has not been using the past week.    Anemia  Assessment & Plan  Most recent hemoglobin 12.1   Continue B12 supplementation  Outpatient f/u with PCP    Tobacco use disorder  Assessment & Plan  Smoking cessation counseling  Had been on nicotine patch 21 mg / 24-hour, however decreased to 14 mg    COPD, severity to be determined ([facility])  Assessment & Plan  Continue inhalers per internal medicine including albuterol as needed as well as fluticasone-vilanterol 1 puff daily  Monitor oxygen saturations and therapy    PAD (peripheral artery disease) ([facility])  Assessment & Plan  Patient was on aspirin, statin and low-dose Xarelto 2.5 mg twice daily  Status post 1/31 bypass axillary-femoral, right with 8 mm ringed PTFE graft    GERD without esophagitis  Assessment & Plan  Continue Protonix    Anxiety and depression  Assessment & Plan  Continue Wellbutrin  mg daily as well as Klonopin  Supportive counseling and consider neuropsychology if indicated and patient agreeable    Essential hypertension  Assessment & Plan  Monitor pressures in therapy however has been off of regular  scheduled antihypertensives  Ordered hydralazine as needed for systolic blood pressure greater than 160    Type 2 diabetes mellitus with diabetic polyneuropathy (HCC)  Assessment & Plan  Lab Results   Component Value Date    HGBA1C 6.0 (H) 02/28/2024       Recent Labs     03/12/24  1557 03/13/24  0625 03/13/24  1144 03/13/24  1603   POCGLU 120 125 166* 91     Here: Metformin 500mg BID and CDI/Accuchecks  Monitor per internal medicine and make adjustments as needed  Glycemic control has been good we will need to continue to for appropriate wound healing potential        Health Maintenance  #Delirium/Sleep: At risk. Optimize sleep/wake, bowel, bladder, and pain management.  #Bowel: Last BM 3/12, miralax daily, Senokot-S 1 tablet BID - can wean off after discharge.   #Bladder: Voiding and continent  #Skin/Pressure Injury Prevention: Turn Q2hr in bed, with weight shifts [id number] in wheelchair.  #DVT Prophylaxis:  Xarelto ppx dose, SCDs  #GI Prophylaxis: Not indicated  #Code Status: DNR/DNI  #FEN: Diabetic diet, [place] with Lunch/Dinner  #Dispo: Team 3/13: ADD 3/14 with home PT/OT/RN.   Will need f/u with PCP, Wound Care, and Podiatry     Objective:    Functional Update:  PT: Ind bed mobility, Margaux ambulation 10' with RW, Ind wheelchair mobility, Margaux ramp  OT: Ind eating, Sup grooming, bathing, UB dressing, LB dressing, Sup toileting, Sup toilet tranfer    Allergies per EMR    Physical Exam:  Temp:  [97.7 °F (36.5 °C)-98.1 °F (36.7 °C)] 98.1 °F (36.7 °C)  HR:  [75-88] 79  Resp:  [16-17] 17  BP: (116-136)/(50-64) 121/56  Oxygen Therapy  SpO2: 94 %    Gen: No acute distress, Well-nourished, well-appearing.  HEENT: Moist mucus membranes, Normocephalic/Atraumatic  Cardiovascular: Regular rate, rhythm, S1/S2. Distal pulses palpable  Heme/Extr: No edema  Pulmonary: Non-labored breathing. Lungs CTAB  : No kraft  GI: Soft, non-tender, non-distended  Integumentary: Skin is warm, dry.   Neuro: AAOx3, Speech is intact.  Appropriate to questioning  Psych: Normal mood and affect.     Diagnostic Studies: Reviewed, no new imaging    Laboratory:  Reviewed   Results from last 7 days   Lab Units 03/11/24  0559   HEMOGLOBIN g/dL 12.1   HEMATOCRIT % 39.0   WBC Thousand/uL 7.23     Results from last 7 days   Lab Units 03/11/24  0559   BUN mg/dL 17   POTASSIUM mmol/L 4.0   CHLORIDE mmol/L 102   CREATININE mg/dL 0.62            Patient Active Problem List   Diagnosis    Type 2 diabetes mellitus with diabetic polyneuropathy (HCC)    Essential hypertension    Anxiety and depression    GERD without esophagitis    Urinary incontinence    Degenerative lumbar disc    Lactic acidosis    Hypomagnesemia    Bladder neoplasm    Left renal mass    PAD (peripheral artery disease) (HCC)    Abnormal CT of the chest    COPD, severity to be determined (HCC)    Tobacco use disorder    Age-related osteoporosis without current pathological fracture    Ankle ulcer, right, with fat layer exposed (HCC)    Diabetic foot ulcer with osteomyelitis ([facility])    Preoperative cardiovascular examination    Constipation    Hematoma    Anemia    At risk for venous thromboembolism (VTE)    At high risk for skin breakdown    Wound of skin    Impaired mobility and activities of daily living    Acute pain due to injury    Leukocytosis    Surgical site infection    Sepsis without acute organ dysfunction (HCC)    Other dietary vitamin B12 deficiency anemia    Sacral wound    Impulsive         Medications  Current Facility-Administered Medications   Medication Dose Route Frequency Provider Last Rate    acetaminophen  975 mg Oral Q8H JILLIAN ROXANE Bear      albuterol  2 puff Inhalation Q6H PRN ROXANE Bear      Apoaequorin  20 mg Oral Daily Denisse Berger PA-C      aspirin  81 mg Oral Daily ROXANE Bear      atorvastatin  10 mg Oral Daily With Dinner Denisse Berger PA-C      buPROPion  300 mg Oral Daily ROXANE Bear      cefpodoxime  400 mg Oral BID With  Meals Vanesa Sheehan, CRNP      clonazePAM  1 mg Oral QPM Vanesa Sheehan, CRNP      cyanocobalamin  1,000 mcg Oral Daily Vanesa Sheehan, CRNP      fish oil  1,000 mg Oral Daily Vanesa Sheehan, CRNP      fluticasone-vilanterol  1 puff Inhalation Daily Vanesa Sheehan, CRNP      hydrALAZINE  25 mg Oral Q8H PRN Vanesa Sheehan, CRNP      [START ON 3/14/2024] lisinopril  5 mg Oral Daily Vanesa Sheehan, CRNP      melatonin  6 mg Oral HS PRN Alan England MD      metFORMIN  500 mg Oral BID With Meals Vanesa Sheehan, CRNP      methocarbamol  250 mg Oral Q8H JILLIAN Vanesa Sheehan, CRNP      multivitamin-minerals  1 tablet Oral Daily Vanesa Sheehan, CRNP      nicotine  1 patch Transdermal Daily Vanesa Sheehan, CRNP      oxyCODONE  2.5 mg Oral Q4H PRN Vanesa Sheehan, CRNP      Or    oxyCODONE  5 mg Oral Q4H PRN Vanesa Sheehan, CRNP      pantoprazole  40 mg Oral Early Morning Vanesa Sheehan, CRNP      polyethylene glycol  17 g Oral Daily Vanesa Sheehan, CRNP      pregabalin  200 mg Oral TID Vanesa Sheehan, CRNP      rivaroxaban  2.5 mg Oral BID Vanesa Sheehan, CRNP      senna-docusate sodium  1 tablet Oral BID Vanesa Sheehan, CRNP            ** Please Note: Fluency Direct voice to text software may have been used in the creation of this document. **

## 2024-03-14 NOTE — PLAN OF CARE
Problem: PAIN - ADULT  Goal: Verbalizes/displays adequate comfort level or baseline comfort level  Description: Interventions:  - Encourage patient to monitor pain and request assistance  - Assess pain using appropriate pain scale  - Administer analgesics based on type and severity of pain and evaluate response  - Implement non-pharmacological measures as appropriate and evaluate response  - Consider cultural and social influences on pain and pain management  - Notify physician/advanced practitioner if interventions unsuccessful or patient reports new pain  Outcome: Progressing     Problem: INFECTION - ADULT  Goal: Absence or prevention of progression during hospitalization  Description: INTERVENTIONS:  - Assess and monitor for signs and symptoms of infection  - Monitor lab/diagnostic results  - Monitor all insertion sites, i.e. indwelling lines, tubes, and drains  - Monitor endotracheal if appropriate and nasal secretions for changes in amount and color  - [place] appropriate cooling/warming therapies per order  - Administer medications as ordered  - Instruct and encourage patient and family to use good hand hygiene technique  - Identify and instruct in appropriate isolation precautions for identified infection/condition  Outcome: Progressing     Problem: SAFETY ADULT  Goal: Patient will remain free of falls  Description: INTERVENTIONS:  - Educate patient/family on patient safety including physical limitations  - Instruct patient to call for assistance with activity   - Consult OT/PT to assist with strengthening/mobility   - Keep Call bell within reach  - Keep bed low and locked with side rails adjusted as appropriate  - Keep care items and personal belongings within reach  - Initiate and maintain comfort rounds  - Make Fall Risk Sign visible to staff  - Offer Toileting every 2 Hours, in advance of need  - Initiate/Maintain bed/chair alarm  - Obtain necessary fall risk management equipment: alarms   - Apply  yellow socks and bracelet for high fall risk patients  - Consider moving patient to room near nurses station  Outcome: Progressing  Goal: Maintain or return to baseline ADL function  Description: INTERVENTIONS:  -  Assess patient's ability to carry out ADLs; assess patient's baseline for ADL function and identify physical deficits which impact ability to perform ADLs (bathing, care of mouth/teeth, toileting, grooming, dressing, etc.)  - Assess/evaluate cause of self-care deficits   - Assess range of motion  - Assess patient's mobility; develop plan if impaired  - Assess patient's need for assistive devices and provide as appropriate  - Encourage maximum independence but intervene and supervise when necessary  - Involve family in performance of ADLs  - Assess for home care needs following discharge   - Consider OT consult to assist with ADL evaluation and planning for discharge  - Provide patient education as appropriate  Outcome: Progressing  Goal: Maintains/Returns to pre admission functional level  Description: INTERVENTIONS:  - Perform AM-PAC 6 Click Basic Mobility/ Daily Activity assessment daily.  - Set and communicate daily mobility goal to care team and patient/family/caregiver.   - Collaborate with rehabilitation services on mobility goals if consulted  - Perform Range of Motion 3 times a day.  - Reposition patient every 2 hours.  - Dangle patient 3 times a day  - Stand patient 3 times a day  - Ambulate patient 3 times a day  - Out of bed to chair 3 times a day   - Out of bed for meals 3 times a day  - Out of bed for toileting  - Record patient progress and toleration of activity level   Outcome: Progressing     Problem: DISCHARGE PLANNING  Goal: Discharge to home or other facility with appropriate resources  Description: INTERVENTIONS:  - Identify barriers to discharge w/patient and caregiver  - Arrange for needed discharge resources and transportation as appropriate  - Identify discharge learning needs  (meds, wound care, etc.)  - Arrange for interpretive services to assist at discharge as needed  - Refer to Case Management Department for coordinating discharge planning if the patient needs post-hospital services based on physician/advanced practitioner order or complex needs related to functional status, cognitive ability, or social support system  Outcome: Progressing     Problem: Prexisting or High Potential for Compromised Skin Integrity  Goal: Skin integrity is maintained or improved  Description: INTERVENTIONS:  - Identify patients at risk for skin breakdown  - Assess and monitor skin integrity  - Assess and monitor nutrition and hydration status  - Monitor labs   - Assess for incontinence   - Turn and reposition patient  - Assist with mobility/ambulation  - Relieve pressure over bony prominences  - Avoid friction and shearing  - Provide appropriate hygiene as needed including keeping skin clean and dry  - Evaluate need for skin moisturizer/barrier cream  - Collaborate with interdisciplinary team   - Patient/family teaching  - Consider wound care consult   Outcome: Progressing

## 2024-03-14 NOTE — ASSESSMENT & PLAN NOTE
- Rehabilitation medicine physician for daily monitoring of care, 24 hour availability for acute  medical issues, medication management, and therapeutic and diagnostic assessments.  - 24 hour rehabilitation nursing 7 days per week for: management/teaching of medications,  bowel/bladder routine, skin care.  - PT, OT for 2-3 hours per day, 5 to 6 days per week; 15 hours per week completed  - Rehabilitation Psychology for adjustment and coping  - Discharged home with home pt/ot/rn.  
Acetaminophen 975 mg every 8 hours  Lyrica 200 mg 3 times daily  Robaxin 250 mg every 8 hours - would make PRN at discharge  Oxycodone 2.5-5 mg every 4 hours as needed - discontinued at discharge   
Consult wound nurse for management  Continue offloading  
Consult wound nurse for management  Continue offloading  
Continue Protonix  
Continue Wellbutrin  mg daily as well as Klonopin  Supportive counseling and consider neuropsychology if indicated and patient agreeable  
Continue current medications  stable  
Continue current medications  stable  
Continue inhalers per internal medicine including albuterol as needed as well as fluticasone-vilanterol 1 puff daily  Monitor oxygen saturations and therapy  
Continue nicotine patch  Recommend cessation  
Had impulsive behaviors during prior admissions and discussed overall behavioral plan.  Patient on last admission was impulsive and not using call bell appropriately as well as not following weightbearing restrictions  Discussed with patient again however continued vigilance with nursing and therapy checks, alarms and relatively sensitive settings and would benefit from Q 2-hour bowel bladder checks  
Hydralazine as needed.  Was taking Lisinopril 5mg daily and amiloride 5mg daily prior to admission.  stable  
Lab Results   Component Value Date    HGBA1C 6.0 (H) 02/28/2024       Recent Labs     03/04/24  1651 03/04/24  2044 03/05/24  0716 03/05/24  1159   POCGLU 153* 152* 139 107       Blood Sugar Average: Last 72 hrs:    Home: Takes metformin 1000 mg twice daily/Januvia 100 mg at lunchtime  Here: Sliding scale/DM diet  Will work to resuming oral medications during her stay in [facility]  
Lab Results   Component Value Date    HGBA1C 6.0 (H) 02/28/2024       Recent Labs     03/04/24  1651 03/04/24  2044 03/05/24  0716 03/05/24  1159   POCGLU 153* 152* 139 107       Blood Sugar Average: Last 72 hrs:    S/p medial ulcer debridement/partial second toe amputation 2/4/2024  Recent STSG on 2/28/2024 with VAC removal 3/4/2024  Continue local care  NWB  Consult podiatry for management  Consult PMR    
Lab Results   Component Value Date    HGBA1C 6.0 (H) 02/28/2024       Recent Labs     03/05/24  1159 03/05/24  1631 03/05/24  2120 03/06/24  0702   POCGLU 107 114 199* 154*         Blood Sugar Average: Last 72 hrs:  (P) 155.0305474363954793  S/p medial ulcer debridement/partial second toe amputation 2/4/2024  Recent STSG on 2/28/2024 with VAC removal 3/4/2024  Continue local care  NWB  podiatry following for management  PMR managing therapy/pain    
Lab Results   Component Value Date    HGBA1C 6.0 (H) 02/28/2024       Recent Labs     03/05/24  1159 03/05/24  1631 03/05/24 2120 03/06/24  0702   POCGLU 107 114 199* 154*         Blood Sugar Average: Last 72 hrs:  (P) 155.7578374817057103  Home: Takes metformin 1000 mg twice daily/Januvia 100 mg at lunchtime  Here: Sliding scale/DM diet/metformin 500mg bid  Start metformin as above and move towards getting back to oral meds  
Lab Results   Component Value Date    HGBA1C 6.0 (H) 02/28/2024       Recent Labs     03/06/24  1106 03/06/24  1604 03/06/24  2115 03/07/24  0652   POCGLU 143* 79 140 155*         Blood Sugar Average: Last 72 hrs:  (P) 140.1130756001759952  S/p medial ulcer debridement/partial second toe amputation 2/4/2024  Recent STSG on 2/28/2024 with VAC removal 3/4/2024  Continue local care  NWB  podiatry following for management  PMR managing therapy/pain    
Lab Results   Component Value Date    HGBA1C 6.0 (H) 02/28/2024       Recent Labs     03/06/24  1106 03/06/24  1604 03/06/24  2115 03/07/24  0652   POCGLU 143* 79 140 155*         Blood Sugar Average: Last 72 hrs:  (P) 140.1880099187833466  Home: Takes metformin 1000 mg twice daily/Januvia 100 mg at lunchtime  Here: Sliding scale/DM diet/metformin 500mg bid  Continue metformin as above and move towards getting back to oral meds  
Lab Results   Component Value Date    HGBA1C 6.0 (H) 02/28/2024       Recent Labs     03/07/24  1555 03/07/24  2143 03/08/24  0650 03/08/24  1142   POCGLU 104 157* 127 105         Blood Sugar Average: Last 72 hrs:  (P) 130.1908363446786709  S/p medial ulcer debridement/partial second toe amputation 2/4/2024  Recent STSG on 2/28/2024 with VAC removal 3/4/2024  Continue local care  NWB  podiatry following for management  PMR managing therapy/pain    
Lab Results   Component Value Date    HGBA1C 6.0 (H) 02/28/2024       Recent Labs     03/07/24  1555 03/07/24  2143 03/08/24  0650 03/08/24  1142   POCGLU 104 157* 127 105         Blood Sugar Average: Last 72 hrs:  (P) 130.7591077194744639  Home: Takes metformin 1000 mg twice daily/Januvia 100 mg at lunchtime  Here: Sliding scale/DM diet/metformin 500mg bid  Continue metformin as above and move towards getting back to oral meds  Blood sugar stable  
Lab Results   Component Value Date    HGBA1C 6.0 (H) 02/28/2024       Recent Labs     03/08/24  1556 03/08/24  2114 03/09/24  0628 03/09/24  1042   POCGLU 110 144* 140 223*         Blood Sugar Average: Last 72 hrs:  (P) 133.3527068875673884  S/p medial ulcer debridement/partial second toe amputation 2/4/2024  Recent STSG on 2/28/2024 with VAC removal 3/4/2024  Continue local care  NWB  podiatry following for management  PMR managing therapy/pain    
Lab Results   Component Value Date    HGBA1C 6.0 (H) 02/28/2024       Recent Labs     03/08/24  1556 03/08/24  2114 03/09/24  0628 03/09/24  1042   POCGLU 110 144* 140 223*         Blood Sugar Average: Last 72 hrs:  (P) 133.7236159459497407  Home: Takes metformin 1000 mg twice daily/Januvia 100 mg at lunchtime  Here: Sliding scale/DM diet/metformin 500mg bid  Continue metformin as above and move towards getting back to oral meds  Blood sugar elevated before lunch. Will continue to monitor trend.  
Lab Results   Component Value Date    HGBA1C 6.0 (H) 02/28/2024       Recent Labs     03/10/24  1552 03/10/24  2044 03/11/24  0558 03/11/24  1039   POCGLU 88 173* 158* 152*         Blood Sugar Average: Last 72 hrs:  (P) 135.0649304927064267  S/p medial ulcer debridement/partial second toe amputation 2/4/2024  Recent STSG on 2/28/2024 with VAC removal 3/4/2024  Continue local care  NWB  podiatry following for management  PMR managing therapy/pain    
Lab Results   Component Value Date    HGBA1C 6.0 (H) 02/28/2024       Recent Labs     03/10/24  1552 03/10/24  2044 03/11/24  0558 03/11/24  1039   POCGLU 88 173* 158* 152*         Blood Sugar Average: Last 72 hrs:  (P) 135.9239566471642752  Home: Takes metformin 1000 mg twice daily/Januvia 100 mg at lunchtime  Here: Sliding scale/DM diet/metformin 500mg bid  Continue metformin as above and move towards getting back to oral meds  No changes today.  
Lab Results   Component Value Date    HGBA1C 6.0 (H) 02/28/2024       Recent Labs     03/11/24  1039 03/11/24  1604 03/11/24  2107 03/12/24  0620   POCGLU 152* 99 133 124         Blood Sugar Average: Last 72 hrs:  (P) 136  Home: Takes metformin 1000 mg twice daily/Januvia 100 mg at lunchtime  Here: DC sliding scale as BS have remained stable with metformin 500mg bid  stable  
Lab Results   Component Value Date    HGBA1C 6.0 (H) 02/28/2024       Recent Labs     03/11/24  1039 03/11/24  1604 03/11/24  2107 03/12/24  0620   POCGLU 152* 99 133 124         Blood Sugar Average: Last 72 hrs:  (P) 136  S/p medial ulcer debridement/partial second toe amputation 2/4/2024  Recent STSG on 2/28/2024 with VAC removal 3/4/2024  Continue local care; refer to images  NWB  podiatry following for management  PMR managing therapy/pain    
Lab Results   Component Value Date    HGBA1C 6.0 (H) 02/28/2024       Recent Labs     03/12/24  0620 03/12/24  1047 03/12/24  1557 03/13/24  0625   POCGLU 124 82 120 125         Blood Sugar Average: Last 72 hrs:  (P) 128.4030817645412471  Home: Takes metformin 1000 mg twice daily/Januvia 100 mg at lunchtime  Here: metformin 500mg bid  Will dc home on metformin 1g bid and test BS  Resume januvia if BS remain >150  
Lab Results   Component Value Date    HGBA1C 6.0 (H) 02/28/2024       Recent Labs     03/12/24  0620 03/12/24  1047 03/12/24  1557 03/13/24  0625   POCGLU 124 82 120 125         Blood Sugar Average: Last 72 hrs:  (P) 128.7515549155937426  S/p medial ulcer debridement/partial second toe amputation 2/4/2024  Recent STSG on 2/28/2024 with VAC removal 3/4/2024  Continue local care; refer to images  NWB  podiatry following for management  PMR managing therapy/pain    
Lab Results   Component Value Date    HGBA1C 6.0 (H) 02/28/2024       Recent Labs     03/13/24  1144 03/13/24  1603 03/13/24  2146 03/14/24  0623   POCGLU 166* 91 93 134         Blood Sugar Average: Last 72 hrs:  (P) 123.4767396109098736  Home: Takes metformin 1000 mg twice daily/Januvia 100 mg at lunchtime  Here: metformin 500mg bid  Will dc home on metformin 1g bid and test BS  Resume januvia if BS remain >150  
Lab Results   Component Value Date    HGBA1C 6.0 (H) 02/28/2024       Recent Labs     03/13/24  1144 03/13/24  1603 03/13/24  2146 03/14/24  0623   POCGLU 166* 91 93 134         Blood Sugar Average: Last 72 hrs:  (P) 123.7365983351938912  S/p medial ulcer debridement/partial second toe amputation 2/4/2024  Recent STSG on 2/28/2024 with VAC removal 3/4/2024  Continue local care; refer to images  NWB  Outpt Podiatry follow-up  
Lab Results   Component Value Date    HGBA1C 6.0 (H) 02/28/2024       Recent Labs     03/13/24  1144 03/13/24  1603 03/13/24  2146 03/14/24  0623   POCGLU 166* 91 93 134     Here: Metformin 500mg BID and CDI/Accuchecks  IM discharged home on 1000mg BID  Monitor per internal medicine and make adjustments as needed  Glycemic control has been good we will need to continue to for appropriate wound healing potential  
Monitor pressures in therapy however has been off of regular scheduled antihypertensives  Ordered hydralazine as needed for systolic blood pressure greater than 160  
Most recent hemoglobin 12.1   Continue B12 supplementation  Outpatient f/u with PCP  
Needed treatment with cefepime for surgical site infection however also on vancomycin which was discontinued on 2/16  Right groin breakdown status post wound washout with VAC placement on 2/14 as well as 2/19  Was followed by infectious disease and continued on cefepime with plan to transition to cefpodoxime 400 mg twice daily for 4 weeks.  Cultures were positive for Klebsiella and Proteus  VAC to the right groin as well as the foot have since been discontinued and continues for cefpodoxime for antibiotic treatment  Maintain nonweightbearing to the right lower extremity  Underwent split thickness skin grafting with podiatry on 2/28 for the right foot  Plan is outpatient f/u with Podiatry and home nursing for wound care.   
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    
Patient was on aspirin, statin and low-dose Xarelto 2.5 mg twice daily  Status post 1/31 bypass axillary-femoral, right with 8 mm ringed PTFE graft  
S/p right axilla/femoral bypass 1/31/2024  Postop infection s/p washout/debridement 2/14 and 2/19  VAC removed 2/28/2024  Continue oral antibiotics through 3/22/2024  Cultures positive Klebsiella/Proteus  Continue local dressing changes  Continue xarelto  Refer to media  Outpt Vascular Surgery follow-up scheduled.  
S/p right axilla/femoral bypass 1/31/2024  Postop infection s/p washout/debridement 2/14 and 2/19  VAC removed 2/28/2024  Continue oral antibiotics through 3/22/2024  Cultures positive Klebsiella/Proteus  Continue local dressing changes  F/u vascular surgery scheduled  Continue xarelto  Refer to media  
S/p right axilla/femoral bypass 1/31/2024  Postop infection s/p washout/debridement 2/14 and 2/19  VAC removed 2/28/2024  Continue oral antibiotics through 3/22/2024  Cultures positive Klebsiella/Proteus  Continue local dressing changes  Will consult vascular surgery if needed for local care  
S/p right axilla/femoral bypass 1/31/2024  Postop infection s/p washout/debridement 2/14 and 2/19  VAC removed 2/28/2024  Continue oral antibiotics through 3/22/2024  Cultures positive Klebsiella/Proteus  Continue local dressing changes  Will consult vascular surgery if needed for local care  Refer to media  
S/p right axillo-femoral bypass 1/31/2024  Continue aspirin/statin/low-dose Xarelto  
Smoking cessation counseling  Had been on nicotine patch 21 mg / 24-hour, however decreased to 14 mg  Continue wean at discharge  
Unstageable wound on the sacrum on initial evaluations, POA  Consult wound care for management  Has gotten smaller/improved during this stay  Still unstageable  Vigilant turns in bed and weight shifts, daily sacral checks by nursing  Will need outpatient home nursing for wound care, and also will need outpatient follow-up with wound care center to be arranged by wound care team.   
Was taking Lisinopril 5mg daily and amiloride 5mg daily prior to admission.  Added back lisinopril for renal protection  Hydralazine as needed is ordered however has not required any doses  
Was taking Lisinopril 5mg daily and amiloride 5mg daily prior to admission.  Added back lisinopril for renal protection.  Hydralazine as needed is ordered however has not required any doses  
Was taking Lisinopril 5mg daily and amiloride 5mg daily prior to admission.  BP remains stable on no medications here however will add back 2.5mg lisinopril d/t renal protection for her DM  Hydralazine as needed is ordered however has not required any doses  
I have personally seen and examined this patient. I have fully participated in the care of this patient. I have reviewed all pertinent clinical information, including history physical exam, plan and the Resident's note and agree except as noted

## 2024-03-14 NOTE — NURSING NOTE
Clinically Significant Medication Issue  Time of Stay: DISCHARGE    Did a complete drug regimen review identify potential clinically significant medication issues?    no

## 2024-03-14 NOTE — CASE MANAGEMENT
Team dc summary - pt made good progress and returned home w/family with continued hhc services through [facility] for rn pt and ot. Pt received a liteweight w/c through [facility] prior to dc. Pts family present for dc instructions and provided trasnport home.

## 2024-03-14 NOTE — PROGRESS NOTES
Physical Medicine and Rehabilitation Progress Note  Lona Cedeno 78 y.o. female MRN: [id number]  Unit/Bed#: [facility] [place] 974-01 Encounter: [id number]    To Review: Lona Cedeno is a 78 y.o. female with history of anxiety, closed fracture of the coccyx in May 2023, diabetes, GERD, hyperlipidemia, hypertension, IBS who presented to the [facility] on 1/23 from the podiatry office with a diabetic ulcer of the right toe and right ankle with exposed Achilles tendon.  She was stabbed have osteomyelitis of the right foot transferred to [facility] to reconsider vascularization and surgical options.  She is recommended to undergo an extra-anatomic bypass.  There was discussion about right transtibial amputation however decision was made for attempted limb salvage.  She underwent the above bypass on 1/31/2024 as well as on 2/4/2024 and underwent right toe and heel wound debridement with cycle and partial amputation of the toe and VAC placement.  Her course was complicated by arrest chest wall hematoma s/p evacuation on 2/4 with vascular.  On 2/13 the patient needed discharge back to acute care in the setting of fevers and leukocytosis from a potential infectious standpoint.  She was also given a discharge from the [facility] to acute care setting after podiatry wanted to take the patient back for split thickness skin grafting which occurred on 2/28 with Dr. Yeboah.  Patient was transition to oral antibiotics and wound vacs have been removed.  She remains nonweightbearing to the right lower extremity. The patient was evaluated by the Rehabilitation team and deemed an appropriate candidate for comprehensive inpatient rehabilitation and admitted to the [facility] on 3/5/2024  2:03 PM     Chief Complaint: Feels happy about discharge today    Interval History/Subjective:  No major events overnight. Pain is well controlled. Happy about discharge today, but will miss the [facility] staff. No new CP, SOB, fevers,  chills, N/V, abdominal pain. Last BM was 3/12.     ROS:  A 10 point review of systems was negative except for what is noted in the HPI    Today's Changes:  Reviewed discharged medications and follow-up and updated those. Ordered home therapies and nursing.  Updated discharge instructions for patient.  Ready for discharge today from rehab perspective.     Total visit time: 35 minutes, with more than 50% spent counseling/coordinating care. Counseling includes discussion with patient re: progress in therapies, functional issues observed by therapy staff, and discussion with patient regarding their current medical state and wellbeing. Coordination of patient's care was performed in conjunction with Internal Medicine service to monitor patient's labs, vitals, and management of their comorbidities.      Assessment/Plan:    * Surgical site infection  Assessment & Plan  Needed treatment with cefepime for surgical site infection however also on vancomycin which was discontinued on 2/16  Right groin breakdown status post wound washout with VAC placement on 2/14 as well as 2/19  Was followed by infectious disease and continued on cefepime with plan to transition to cefpodoxime 400 mg twice daily for 4 weeks.  Cultures were positive for Klebsiella and Proteus  VAC to the right groin as well as the foot have since been discontinued and continues for cefpodoxime for antibiotic treatment  Maintain nonweightbearing to the right lower extremity  Underwent split thickness skin grafting with podiatry on 2/28 for the right foot  Plan is outpatient f/u with Podiatry and home nursing for wound care.     Sacral wound  Assessment & Plan  Unstageable wound on the sacrum on initial evaluations, POA  Consult wound care for management  Has gotten smaller/improved during this stay  Still unstageable  Vigilant turns in bed and weight shifts, daily sacral checks by nursing  Will need outpatient home nursing for wound care, and also will need  outpatient follow-up with wound care center to be arranged by wound care team.     Impaired mobility and activities of daily living  Assessment & Plan  - Rehabilitation medicine physician for daily monitoring of care, 24 hour availability for acute  medical issues, medication management, and therapeutic and diagnostic assessments.  - 24 hour rehabilitation nursing 7 days per week for: management/teaching of medications,  bowel/bladder routine, skin care.  - PT, OT for 2-3 hours per day, 5 to 6 days per week; 15 hours per week completed  - Rehabilitation Psychology for adjustment and coping  - Discharged home with home pt/ot/rn.    Diabetic foot ulcer with osteomyelitis (HCC)  Assessment & Plan  Patient presented with right posterior ankle ulceration with exposed Achilles tendon and required foot debridement on 2/4/2024 with VAC placement with right medial ankle ulcer, right second toe osteomyelitis status post right second toe amputation  Wound vacs have since been discontinued and continues to be followed by podiatry  Require going back to the OR on 2/28 for split-thickness grafting  Remains nonweightbearing to the right lower extremity  Outpatient f/u with podiatry      Impulsive-resolved as of 3/13/2024  Assessment & Plan  Had impulsive behaviors during prior admissions and discussed overall behavioral plan.  Patient on last admission was impulsive and not using call bell appropriately as well as not following weightbearing restrictions  Discussed with patient again however continued vigilance with nursing and therapy checks, alarms and relatively sensitive settings and would benefit from Q 2-hour bowel bladder checks    Anemia  Assessment & Plan  Most recent hemoglobin 12.1   Continue B12 supplementation  Outpatient f/u with PCP    Tobacco use disorder  Assessment & Plan  Smoking cessation counseling  Had been on nicotine patch 21 mg / 24-hour, however decreased to 14 mg  Continue wean at discharge    COPD,  severity to be determined ([facility])  Assessment & Plan  Continue inhalers per internal medicine including albuterol as needed as well as fluticasone-vilanterol 1 puff daily  Monitor oxygen saturations and therapy    PAD (peripheral artery disease) ([facility])  Assessment & Plan  Patient was on aspirin, statin and low-dose Xarelto 2.5 mg twice daily  Status post 1/31 bypass axillary-femoral, right with 8 mm ringed PTFE graft    GERD without esophagitis  Assessment & Plan  Continue Protonix    Anxiety and depression  Assessment & Plan  Continue Wellbutrin  mg daily as well as Klonopin  Supportive counseling and consider neuropsychology if indicated and patient agreeable    Essential hypertension  Assessment & Plan  Monitor pressures in therapy however has been off of regular scheduled antihypertensives  Ordered hydralazine as needed for systolic blood pressure greater than 160    Type 2 diabetes mellitus with diabetic polyneuropathy ([facility])  Assessment & Plan  Lab Results   Component Value Date    HGBA1C 6.0 (H) 02/28/2024       Recent Labs     03/13/24  1144 03/13/24  1603 03/13/24  2146 03/14/24  0623   POCGLU 166* 91 93 134     Here: Metformin 500mg BID and CDI/Accuchecks  IM discharged home on 1000mg BID  Monitor per internal medicine and make adjustments as needed  Glycemic control has been good we will need to continue to for appropriate wound healing potential    Acute pain due to injury-resolved as of 3/13/2024  Assessment & Plan  Acetaminophen 975 mg every 8 hours  Lyrica 200 mg 3 times daily  Robaxin 250 mg every 8 hours - would make PRN at discharge  Oxycodone 2.5-5 mg every 4 hours as needed - discontinued at discharge         Health Maintenance  #Delirium/Sleep: At risk. Optimize sleep/wake, bowel, bladder, and pain management.  #Bowel: Last BM 3/12, miralax daily, Senokot-S 1 tablet BID - can wean off after discharge.   #Bladder: Voiding and continent  #Skin/Pressure Injury Prevention: Turn Q2hr in bed, with  weight shifts [id number] in wheelchair.  #DVT Prophylaxis:  Xarelto ppx dose, SCDs  #GI Prophylaxis: Not indicated  #Code Status: DNR/DNI  #FEN: Diabetic diet, [place] with Lunch/Dinner  #Dispo: Team 3/13: ADD 3/14 with home PT/OT/RN.   Will need f/u with PCP, Wound Care, and Podiatry     Objective:    Functional Update:  PT: Ind bed mobility, Margaux ambulation 10' with RW, Ind wheelchair mobility, Margaux ramp  OT: Ind eating, Sup grooming, bathing, UB dressing, LB dressing, Sup toileting, Sup toilet tranfer    Allergies per EMR    Physical Exam:  Temp:  [97.9 °F (36.6 °C)-98.2 °F (36.8 °C)] 98.2 °F (36.8 °C)  HR:  [72-79] 72  Resp:  [17-18] 18  BP: (116-128)/(50-59) 128/57  Oxygen Therapy  SpO2: 93 %    Gen: No acute distress, Well-nourished, well-appearing.  HEENT: Moist mucus membranes, Normocephalic/Atraumatic  Cardiovascular: Regular rate, rhythm, S1/S2. Distal pulses palpable  Heme/Extr: No edema  Pulmonary: Non-labored breathing. Lungs CTAB  : No kraft  GI: Soft, non-tender, non-distended.   Integumentary: Skin is warm, dry. L toe healing L wound healing. Granulation tissue. No signs of active infection. Photo in chart. Graft site healing. Improving sacral wound, although some epibole and may require outpatient debridement. Groin wound with granulation and fibrous tissue.   Neuro: AAOx3, Speech is intact. Appropriate to questioning. Functional strength throughout.   Psych: Normal mood and affect.     Diagnostic Studies: Reviewed, no new imaging    Laboratory: Reviewed   Results from last 7 days   Lab Units 03/11/24  0559   HEMOGLOBIN g/dL 12.1   HEMATOCRIT % 39.0   WBC Thousand/uL 7.23     Results from last 7 days   Lab Units 03/11/24  0559   BUN mg/dL 17   POTASSIUM mmol/L 4.0   CHLORIDE mmol/L 102   CREATININE mg/dL 0.62            Patient Active Problem List   Diagnosis    Type 2 diabetes mellitus with diabetic polyneuropathy (HCC)    Essential hypertension    Anxiety and depression    GERD without  esophagitis    Urinary incontinence    Degenerative lumbar disc    Lactic acidosis    Hypomagnesemia    Bladder neoplasm    Left renal mass    PAD (peripheral artery disease) (HCC)    Abnormal CT of the chest    COPD, severity to be determined (HCC)    Tobacco use disorder    Age-related osteoporosis without current pathological fracture    Ankle ulcer, right, with fat layer exposed (HCC)    Diabetic foot ulcer with osteomyelitis (HCC)    Preoperative cardiovascular examination    Constipation    Hematoma    Anemia    At risk for venous thromboembolism (VTE)    At high risk for skin breakdown    Wound of skin    Impaired mobility and activities of daily living    Leukocytosis    Surgical site infection    Sepsis without acute organ dysfunction (HCC)    Other dietary vitamin B12 deficiency anemia    Sacral wound         Medications  Current Facility-Administered Medications   Medication Dose Route Frequency Provider Last Rate    acetaminophen  975 mg Oral Q8H JILLIAN Vanesa Sheehan, CRNP      albuterol  2 puff Inhalation Q6H PRN Vanesa Sheehan, CRNP      Apoaequorin  20 mg Oral Daily Denissegrupo Berger PA-C      aspirin  81 mg Oral Daily Vanesa Sheehan, CRNP      atorvastatin  10 mg Oral Daily With Dinner Denisse Beregr PA-C      buPROPion  300 mg Oral Daily Vanesa Sheehan, CRNP      cefpodoxime  400 mg Oral BID With Meals Vanesa Sheehan, CRNP      clonazePAM  1 mg Oral QPM Vanesa Sheehan, CRNP      cyanocobalamin  1,000 mcg Oral Daily Vanesa Sheehan, CRNP      fish oil  1,000 mg Oral Daily Vanesa Sheehan, CRNP      fluticasone-vilanterol  1 puff Inhalation Daily Vanesa Sheehan, CRNP      hydrALAZINE  25 mg Oral Q8H PRN Vanesa Sheehan, CRNP      lisinopril  5 mg Oral Daily Vanesa Sheehan, CRNP      metFORMIN  500 mg Oral BID With Meals Vanesa Sheehan, CRNP      methocarbamol  250 mg Oral Q8H JILLIAN Vanesa Sheehan, CRNP      multivitamin-minerals  1 tablet Oral Daily Vanesa Sheehan, CRNP      nicotine  1 patch Transdermal Daily Vanesa Sheehan,  CRNP      pantoprazole  40 mg Oral Early Morning Vanesa Sheehan, CRNP      polyethylene glycol  17 g Oral Daily Vanesa Sheehan, CRNP      pregabalin  200 mg Oral TID Vanesa Sheehan, CRNP      rivaroxaban  2.5 mg Oral BID Vanesa Sheehan, CRNP      senna-docusate sodium  1 tablet Oral BID Vanesa Sheehan, CRNP            ** Please Note: Fluency Direct voice to text software may have been used in the creation of this document. **

## 2024-03-14 NOTE — PROGRESS NOTES
[facility] Podiatry - Progress Note  Patient: Lona Cedeno 78 y.o. female   MRN: [id number]  PCP: Vivek Preston DO  Unit/Bed#: -01 Encounter: [id number]  Date Of Visit: 24    ASSESSMENT:    Lona Cedeno is a 78 y.o. female with:    Right posterior ankle ulcer with exposed achilles tendon  - Right wound debridement (DOS: 24)  - Right STSG application (DOS: 24)  Right medial ankle ulcer, limited to breakdown of skin  - Right STSG application (DOS: 24)   Osteomyelitis of right second toe  - Right partial 2nd toe amputation (DOS: 24)  T2DM  PAD  Tobacco use disorder  Diabetic polyneuropathy    PLAN:    Dressings changed at bedside on right lower extremity. Posterior ankle STSG site is stable with no acute clinical signs of infection, remaining staples were removed. Medial ankle STSG site is healed. Donor site is stable with no acute clinical signs of infection.   Patient remains stable for discharge from a podiatry standpoint. She is to follow up in wound care for post operative care, instructions placed with discharge.  Continue local wound care of adaptic, maxorb, DSD to RLE.  Elevation on green foam wedges or pillows when non-ambulatory.  Rest of care per primary team.    Weight bearing status: Nonweightbearing to right lower extremity    SUBJECTIVE:     The patient was seen, evaluated, and assessed at bedside today. The patient was awake, alert, and in no acute distress. No acute events overnight. The patient reports that she is looking forward to being discharged today. Patient denies N/V/F/chills/SOB/CP.      OBJECTIVE:     Vitals:   /56   Pulse 72   Temp 98.2 °F (36.8 °C) (Oral)   Resp 18   Wt 69 kg (152 lb 1.9 oz)   SpO2 93%   BMI 28.74 kg/m²     Temp (24hrs), Av.1 °F (36.7 °C), Min:97.9 °F (36.6 °C), Max:98.2 °F (36.8 °C)      Physical Exam:     General:  Alert, cooperative, and in no distress.  Lower extremity exam:  Cardiovascular status at baseline.   "Neurological status at baseline.  No calf tenderness noted.     Right lower extremity  Wound #: 1  Location: right posterior ankle  Length 5.5cm: Width 2.5cm: Depth 0.1cm:   Deepest Tissue Noted in Base: subcutaneous  Probe to Bone: No  Peripheral Skin Description: Attached  Granulation: 90% Fibrotic Tissue: 10% Necrotic Tissue: 0%   Drainage Amount: minimal, serous  Signs of Infection: No    Wound #: 2  Location: right medial calf donor site  Length 7cm: Width 3cm: Depth 0.1cm:   Deepest Tissue Noted in Base: dermis  Probe to Bone: No  Peripheral Skin Description: Attached  Granulation: 100% Fibrotic Tissue: 0% Necrotic Tissue: 0%   Drainage Amount: None  Signs of Infection: No    Right medial ankle wound is now completely epithelialized and healed with mild hyperkeratotic tissue, no acute clinical signs of infection. Right 2nd partial toe amputation site is completely healed, no acute clinical signs of infection    Left lower extremity: no open wounds, no lesions, no clinical signs of infection, skin is moderately xerotic without fissuring    Clinical Images 03/14/24:                    Additional Data:     Labs:    Results from last 7 days   Lab Units 03/11/24  0559   WBC Thousand/uL 7.23   HEMOGLOBIN g/dL 12.1   HEMATOCRIT % 39.0   PLATELETS Thousands/uL 318   NEUTROS PCT % 61   LYMPHS PCT % 22   MONOS PCT % 8   EOS PCT % 8*     Results from last 7 days   Lab Units 03/11/24  0559   POTASSIUM mmol/L 4.0   CHLORIDE mmol/L 102   CO2 mmol/L 30   BUN mg/dL 17   CREATININE mg/dL 0.62   CALCIUM mg/dL 9.8           * I Have Reviewed All Lab Data Listed Above.    Recent Cultures (last 7 days):               Imaging: I have personally reviewed pertinent films in PACS  EKG, Pathology, and Other Studies: I have personally reviewed pertinent reports.      ** Please Note: Portions of the record may have been created with voice recognition software. Occasional wrong word or \"sound a like\" substitutions may have occurred due to " the inherent limitations of voice recognition software. Read the chart carefully and recognize, using context, where substitutions have occurred. **

## 2024-03-15 ENCOUNTER — HOME CARE VISIT (OUTPATIENT)
Dept: HOME HEALTH SERVICES | Facility: HOME HEALTHCARE | Age: 79
End: 2024-03-15
Payer: COMMERCIAL

## 2024-03-15 VITALS
HEART RATE: 84 BPM | RESPIRATION RATE: 20 BRPM | TEMPERATURE: 98 F | OXYGEN SATURATION: 94 % | SYSTOLIC BLOOD PRESSURE: 132 MMHG | DIASTOLIC BLOOD PRESSURE: 60 MMHG

## 2024-03-15 PROCEDURE — G0299 HHS/HOSPICE OF RN EA 15 MIN: HCPCS

## 2024-03-15 PROCEDURE — 400013 VN SOC

## 2024-03-15 NOTE — OCCUPATIONAL THERAPY NOTE
OT Discharge Summary    Pt has made good  progress during time of stay at the [facility]. Pt is overall functioning at Independent  for ADLs, Independent  for functional transfers and NOT APPLICABLE for functional mobility. Prior to DC Home, DME recommendations include wheelchair . DME was ordered to maximize functional independence and decrease fall risk. Based on pts functional performance, pt is safe to DC Home w/ recommendations noted above. Pt would benefit from continued OT services in home health to maximize functional abilities.     
<-- Click to add NO significant Past Surgical History

## 2024-03-15 NOTE — PROGRESS NOTES
03/15/24 1437   Hello, [Guardian’s Name / Patient’s Name], this is [Caller Name] from [facility], and our clinical care team wanted to check on you / your child after your recent visit to the hospital. It will only take 3-5 minutes. Is this a good time?   Discharge Call Type/ Specific Diagnosis: General Call   General Discharge Phone Call   Were your/your child's discharge instructions clear and understandable? Please tell me in your own words how to care for yourself/your child now that you're home Yes;Patient understood instructions   Have you filled your/your child's new prescriptions yet? Yes   What questions do you have about those medications? No questions   Are you/your child having any unusual symptoms or problems? (Specific to problem- i.e., dressing, pain, bruising or swelling, procedure, etc.) No reported symptoms/problems   Do you have follow up appointment with your/your child's physician? Yes   Is there anything preventing you from keeping that appointment? No;Patient able to keep appointment   Are there any physicians, nurses, or hospital staff you would like us to recognize for doing a very good job? Nurse;PCA/Tech;PT/OT/RT/SLP   Thank you for taking the time to share with me about your care and recovery. Do you have any suggestions for us? No   This call resulted in: No interventions needed   Call Complete   Attempted Number of Calls 1   Discharge phone call complete? Complete

## 2024-03-18 ENCOUNTER — HOME CARE VISIT (OUTPATIENT)
Dept: HOME HEALTH SERVICES | Facility: HOME HEALTHCARE | Age: 79
End: 2024-03-18
Payer: COMMERCIAL

## 2024-03-18 ENCOUNTER — TRANSITIONAL CARE MANAGEMENT (OUTPATIENT)
Dept: FAMILY MEDICINE CLINIC | Facility: CLINIC | Age: 79
End: 2024-03-18

## 2024-03-18 ENCOUNTER — TELEPHONE (OUTPATIENT)
Dept: VASCULAR SURGERY | Facility: CLINIC | Age: 79
End: 2024-03-18

## 2024-03-18 VITALS — DIASTOLIC BLOOD PRESSURE: 72 MMHG | SYSTOLIC BLOOD PRESSURE: 122 MMHG

## 2024-03-18 PROCEDURE — 10330064 BANDAGE, ELAS SLF-CLSR PREM N/S LF 4X5YD

## 2024-03-18 PROCEDURE — 10330064 CLEANSER, WND SEA-CLEANS 6OZ  COLPLT

## 2024-03-18 PROCEDURE — G0299 HHS/HOSPICE OF RN EA 15 MIN: HCPCS

## 2024-03-18 PROCEDURE — 10330064 DRESSING, ADAPTIC 3"X3" (50/BX)

## 2024-03-18 PROCEDURE — 10330064 DRESSING, CALCIUM ALGINATE AG SHEET 4"X4

## 2024-03-18 PROCEDURE — 10330064 BANDAGE, CNFRM 4"X4.1YDS N/S LF (12RL/BG

## 2024-03-18 PROCEDURE — 10330064 TAPE, ADHSV TRANSPORE WHT 2" (6RL/BX 10B

## 2024-03-18 PROCEDURE — 10330064 SPONGE, GAUZE 8PLY N/S 4"X4" (200/PK 20P

## 2024-03-18 PROCEDURE — G0151 HHCP-SERV OF PT,EA 15 MIN: HCPCS

## 2024-03-18 NOTE — TELEPHONE ENCOUNTER
Vascular Nurse Navigator Post Op Call    Procedure: Right - BYPASS AXILLO-FEMORAL. RIGHT WITH 8MM RINGED PTFE GRAFT     Date of Procedure: 1/31/24    Surgeon:   * Seth Hoyos MD - Primary     * Narinder Street DO - Assisting     * Shaq Mendosa DO - Fellow    Procedure: Incision and drainage of mixed hematoma/seroma of right axillary dissection operative site.      Date of Procedure: 2/4/24    Surgeon: MD Dr. Shaq Garcia DO,   Dr. Narinder Street DO     Discharge Date: 2/9/24    Discharge Disposition: Acute Rehab at [facility] Acute Rehab    Readmit to \[facility]\"" 2/13/24 to 2/23/24 with DC to [facility] - Readmit to \[facility]\"" 2/27/24 to 3/5/24 with DC to [facility]    Discharge Date from \[facility]\"" [facility] 3/5/24 to home with [facility]    Leg Weakness?: No    Leg Swelling?: No    Leg Numbness?: No    Chest Pain?: No    Shortness of Breath?: No    Orthopnea?: No    Anticoagulation pt was discharged on post op?: Aspirin and Rivaroxaban (Xarelto)    Statin pt was discharged on post op?:  Lipitor (atorvastatin)    Bleeding?: No    Uncontrolled Pain?: No    Incision Concerns?: No    Fever or Chills?: No      Reviewed discharge instructions and incision care with patient.      NEXT OFFICE VISIT SCHEDULED:  3/22/24 at 11:30 am with Hailee Cabrera PA-C at [facility]    Transportation Confirmed?: Yes - patient's daughter      Any further questions/concerns?  Patient stated that she is doing good since discharge.  She stated that nurse from [facility] was out to see her and said her incisions look good and without any signs of infection.  Reviewed incision care with her - wash daily with soap and water.  Reviewed discharge medications - Aspirin and Xarelto.  All questions answered.  No concerns expressed at this time.  She stated that her daughter is her transportation for her appointments.  Offered to contact patient's daughter to inform of post op appointment and she  was agreeable to same.  Contacted patient's daughter and left message with appointment information.

## 2024-03-19 ENCOUNTER — HOME CARE VISIT (OUTPATIENT)
Dept: HOME HEALTH SERVICES | Facility: HOME HEALTHCARE | Age: 79
End: 2024-03-19
Payer: COMMERCIAL

## 2024-03-19 VITALS
SYSTOLIC BLOOD PRESSURE: 120 MMHG | OXYGEN SATURATION: 95 % | RESPIRATION RATE: 20 BRPM | DIASTOLIC BLOOD PRESSURE: 76 MMHG | HEART RATE: 76 BPM | TEMPERATURE: 98.7 F

## 2024-03-19 PROCEDURE — G0152 HHCP-SERV OF OT,EA 15 MIN: HCPCS | Performed by: OCCUPATIONAL THERAPIST

## 2024-03-19 NOTE — CASE COMMUNICATION
Plan  x 2 weeks for C PT. Patient is able to self manage wheelchair in home. PT plan to work to improve transfers and initiate use of RW in home from room to room while maintaining NWB R LE.  Also HEP needs further progression.  Patient agrees with plan. Patient to contact podiatry to get appointment as she will need instruction on when to begin WB through R LE.

## 2024-03-20 ENCOUNTER — HOME CARE VISIT (OUTPATIENT)
Dept: HOME HEALTH SERVICES | Facility: HOME HEALTHCARE | Age: 79
End: 2024-03-20
Payer: COMMERCIAL

## 2024-03-20 VITALS — HEART RATE: 80 BPM | DIASTOLIC BLOOD PRESSURE: 80 MMHG | OXYGEN SATURATION: 95 % | SYSTOLIC BLOOD PRESSURE: 142 MMHG

## 2024-03-20 VITALS
RESPIRATION RATE: 20 BRPM | OXYGEN SATURATION: 97 % | HEART RATE: 76 BPM | DIASTOLIC BLOOD PRESSURE: 70 MMHG | SYSTOLIC BLOOD PRESSURE: 124 MMHG | TEMPERATURE: 98.1 F

## 2024-03-20 PROCEDURE — G0299 HHS/HOSPICE OF RN EA 15 MIN: HCPCS

## 2024-03-21 ENCOUNTER — HOME CARE VISIT (OUTPATIENT)
Dept: HOME HEALTH SERVICES | Facility: HOME HEALTHCARE | Age: 79
End: 2024-03-21
Payer: COMMERCIAL

## 2024-03-21 PROCEDURE — G0155 HHCP-SVS OF CSW,EA 15 MIN: HCPCS

## 2024-03-21 NOTE — PHYSICAL THERAPY NOTE
PT ARC DISCHARGE SUMMARY     Lona Cedeno presented to the [facility] following hospitalization for diabetic ulcer of the right toe and right ankle with exposed Achilles tendon resulting NWB status and significant decline in functional mobility. Physical therapy plan of care focused on bed mobility, transfer training, gait training, stair navigation, standing balance training , LE strengthening , and activities to improve endurance/activity tolerance. Patient made good progress during their stay. Goals were partially met. Barriers to met goals include: impaired safety awareness placing pt at high risk for falls. On discharge, patient required set up assistance for sit pivot transfers WC<>bed and independent with WC mobility. Did not recommend ambulation with RW as she required at least Margaux and inconsistently maintained NWB status on RLE. Family installed ramp. Patient discharged home with supportive family and HH SN, PT, and OT services.

## 2024-03-22 ENCOUNTER — HOME CARE VISIT (OUTPATIENT)
Dept: HOME HEALTH SERVICES | Facility: HOME HEALTHCARE | Age: 79
End: 2024-03-22
Payer: COMMERCIAL

## 2024-03-22 ENCOUNTER — TELEPHONE (OUTPATIENT)
Age: 79
End: 2024-03-22

## 2024-03-22 VITALS — DIASTOLIC BLOOD PRESSURE: 70 MMHG | OXYGEN SATURATION: 95 % | SYSTOLIC BLOOD PRESSURE: 154 MMHG | HEART RATE: 84 BPM

## 2024-03-22 VITALS — HEART RATE: 76 BPM | DIASTOLIC BLOOD PRESSURE: 60 MMHG | SYSTOLIC BLOOD PRESSURE: 120 MMHG | OXYGEN SATURATION: 98 %

## 2024-03-22 PROCEDURE — G0152 HHCP-SERV OF OT,EA 15 MIN: HCPCS | Performed by: OCCUPATIONAL THERAPIST

## 2024-03-22 PROCEDURE — G0151 HHCP-SERV OF PT,EA 15 MIN: HCPCS

## 2024-03-22 NOTE — CASE COMMUNICATION
OT to continue 2 wk 2 weeks for ADL training,  review of safety with transfers/ mobility,  assistance with recommendations for DME and review/ update of upper extremity home exercise program to improve upper extremity support with transfers due to WB restrictions with her right lower extremity.

## 2024-03-22 NOTE — CASE COMMUNICATION
Patient assisted in making appointment with podiatrist, Dr Gann in effort to determine update of WBS and to floowup on wound care.  Patient was not able to schedule as patient requires a Monday after 2pm due to availability of  transportation.  Patient has no followup with podiatry at this point. Therapist requested she discuss with family and work to get appointment ASAP.

## 2024-03-22 NOTE — TELEPHONE ENCOUNTER
Caller: Loan Cedeno    Doctor: Dr. Gann?    Reason for call: appt/checked chart/she is to be seen at WC/transferred call    Call back#: NA

## 2024-03-23 PROCEDURE — G0180 MD CERTIFICATION HHA PATIENT: HCPCS | Performed by: INTERNAL MEDICINE

## 2024-03-25 ENCOUNTER — RA CDI HCC (OUTPATIENT)
Dept: OTHER | Facility: HOSPITAL | Age: 79
End: 2024-03-25

## 2024-03-25 ENCOUNTER — HOME CARE VISIT (OUTPATIENT)
Dept: HOME HEALTH SERVICES | Facility: HOME HEALTHCARE | Age: 79
End: 2024-03-25
Payer: COMMERCIAL

## 2024-03-25 PROCEDURE — G0152 HHCP-SERV OF OT,EA 15 MIN: HCPCS | Performed by: OCCUPATIONAL THERAPIST

## 2024-03-25 PROCEDURE — G0299 HHS/HOSPICE OF RN EA 15 MIN: HCPCS

## 2024-03-26 ENCOUNTER — HOME CARE VISIT (OUTPATIENT)
Dept: HOME HEALTH SERVICES | Facility: HOME HEALTHCARE | Age: 79
End: 2024-03-26
Payer: COMMERCIAL

## 2024-03-26 VITALS — OXYGEN SATURATION: 94 % | SYSTOLIC BLOOD PRESSURE: 122 MMHG | HEART RATE: 74 BPM | DIASTOLIC BLOOD PRESSURE: 62 MMHG

## 2024-03-26 PROCEDURE — G0151 HHCP-SERV OF PT,EA 15 MIN: HCPCS

## 2024-03-27 ENCOUNTER — HOME CARE VISIT (OUTPATIENT)
Dept: HOME HEALTH SERVICES | Facility: HOME HEALTHCARE | Age: 79
End: 2024-03-27
Payer: COMMERCIAL

## 2024-03-27 ENCOUNTER — RA CDI HCC (OUTPATIENT)
Dept: OTHER | Facility: HOSPITAL | Age: 79
End: 2024-03-27

## 2024-03-27 VITALS — DIASTOLIC BLOOD PRESSURE: 60 MMHG | SYSTOLIC BLOOD PRESSURE: 150 MMHG | OXYGEN SATURATION: 96 % | HEART RATE: 76 BPM

## 2024-03-27 VITALS
HEART RATE: 72 BPM | SYSTOLIC BLOOD PRESSURE: 124 MMHG | RESPIRATION RATE: 20 BRPM | OXYGEN SATURATION: 95 % | DIASTOLIC BLOOD PRESSURE: 70 MMHG | TEMPERATURE: 98.2 F

## 2024-03-27 PROCEDURE — G0299 HHS/HOSPICE OF RN EA 15 MIN: HCPCS

## 2024-03-28 ENCOUNTER — HOME CARE VISIT (OUTPATIENT)
Dept: HOME HEALTH SERVICES | Facility: HOME HEALTHCARE | Age: 79
End: 2024-03-28
Payer: COMMERCIAL

## 2024-03-28 ENCOUNTER — OFFICE VISIT (OUTPATIENT)
Dept: WOUND CARE | Facility: CLINIC | Age: 79
End: 2024-03-28
Payer: COMMERCIAL

## 2024-03-28 VITALS
DIASTOLIC BLOOD PRESSURE: 60 MMHG | HEART RATE: 60 BPM | BODY MASS INDEX: 29.64 KG/M2 | HEIGHT: 61 IN | RESPIRATION RATE: 20 BRPM | WEIGHT: 157 LBS | TEMPERATURE: 96.7 F | SYSTOLIC BLOOD PRESSURE: 144 MMHG

## 2024-03-28 DIAGNOSIS — I73.9 PAD (PERIPHERAL ARTERY DISEASE) (HCC): ICD-10-CM

## 2024-03-28 DIAGNOSIS — S91.001A OPEN WOUND OF RIGHT ANKLE, INITIAL ENCOUNTER: Primary | ICD-10-CM

## 2024-03-28 DIAGNOSIS — E11.49 OTHER DIABETIC NEUROLOGICAL COMPLICATION ASSOCIATED WITH TYPE 2 DIABETES MELLITUS (HCC): ICD-10-CM

## 2024-03-28 PROCEDURE — 11043 DBRDMT MUSC&/FSCA 1ST 20/<: CPT | Performed by: STUDENT IN AN ORGANIZED HEALTH CARE EDUCATION/TRAINING PROGRAM

## 2024-03-28 PROCEDURE — 99213 OFFICE O/P EST LOW 20 MIN: CPT | Performed by: STUDENT IN AN ORGANIZED HEALTH CARE EDUCATION/TRAINING PROGRAM

## 2024-03-28 PROCEDURE — 11046 DBRDMT MUSC&/FSCA EA ADDL: CPT | Performed by: STUDENT IN AN ORGANIZED HEALTH CARE EDUCATION/TRAINING PROGRAM

## 2024-03-28 RX ORDER — LIDOCAINE 40 MG/G
CREAM TOPICAL ONCE
Status: COMPLETED | OUTPATIENT
Start: 2024-03-28 | End: 2024-03-28

## 2024-03-28 RX ADMIN — LIDOCAINE: 40 CREAM TOPICAL at 08:57

## 2024-03-28 NOTE — PROGRESS NOTES
HCC coding opportunities     E11.51     Chart Reviewed number of suggestions sent to Provider: 1   GR    Patients Insurance     Medicare Insurance: [employer]

## 2024-03-28 NOTE — CASE COMMUNICATION
OT reviewed plan for 1 additional visit per plan of care  for continued review of home exercise program and review of  safety with transfers followed by discharge with goals met.

## 2024-03-28 NOTE — PROGRESS NOTES
Patient ID: Lona Cedeno is a 78 y.o. female Date of Birth 1945     Diagnosis:  1. Open wound of right ankle, initial encounter  -     Wound cleansing and dressings Posterior;Right Ankle; Future  -     lidocaine (LMX) 4 % cream  -     Debridement    2. Other diabetic neurological complication associated with type 2 diabetes mellitus ([facility])    3. PAD (peripheral artery disease) ([facility])        Diagnosis ICD-10-CM Associated Orders   1. Open wound of right ankle, initial encounter  S91.001A Wound cleansing and dressings Posterior;Right Ankle     lidocaine (LMX) 4 % cream     Debridement      2. Other diabetic neurological complication associated with type 2 diabetes mellitus ([facility])  E11.49       3. PAD (peripheral artery disease) ([facility])  I73.9            -I spent greater than 45 minutes with the patient reviewing their past medical history, performing a medically appropriate examination and evaluation of the patient, counseling and educating the patient, documenting all findings in EMR, and independently interpreting results and communicating results with patient and discussing their condition and treatment options, risks, and potential complications. Patient has extensive history of nonhealing ulcer left posterior ankle located by pressure, diabetes and peripheral arterial disease.  He is status post toe amputation, wound debridement and split-thickness skin graft.  -I recommend close monitoring with local wound care  -Discussed importance of strict offloading to the wound area  -Discussed importance of glycemic control proper protein intake  -Hospitalization notes reviewed, OR notes reviewed, blood work reviewed and imaging reviewed.   -Patient was educated on clinical signs of wound infection if present she is to call my office immediately  -Return in 1 week for follow-up      Chief Complaint   Patient presents with    New Patient Visit     Discharged from hospital 2 weeks ago. Right posterior ankle wound. Home  health care changing dressing 3 times per week.           Subjective:   78-year-old female with past medical history as below presents with daughter for evaluation of right posterior ankle ulcer located by diabetes, pressure and peripheral arterial disease.  Patient was hospitalized at [place] for wound debridements, vascular workup as well as split-thickness skin graft to the right lower extremity.  Patient reports she also had partial toe amputation on the right.  Overall she is doing well and is at home now with home care nurses coming home for dressing changes.  She denies pain.  She denies any other complaints at this time.  Denies nausea vomit fever chills shortness of breath.        The following portions of the patient's history were reviewed and updated as appropriate:   Patient Active Problem List   Diagnosis    Type 2 diabetes mellitus with diabetic polyneuropathy ([facility])    Essential hypertension    Anxiety and depression    GERD without esophagitis    Urinary incontinence    Degenerative lumbar disc    Lactic acidosis    Hypomagnesemia    Bladder neoplasm    Left renal mass    PAD (peripheral artery disease) ([facility])    Abnormal CT of the chest    COPD, severity to be determined ([facility])    Tobacco use disorder    Age-related osteoporosis without current pathological fracture    Ankle ulcer, right, with fat layer exposed ([facility])    Diabetic foot ulcer with osteomyelitis ([facility])    Preoperative cardiovascular examination    Constipation    Hematoma    Anemia    At risk for venous thromboembolism (VTE)    At high risk for skin breakdown    Wound of skin    Impaired mobility and activities of daily living    Leukocytosis    Surgical site infection    Sepsis without acute organ dysfunction ([facility])    Other dietary vitamin B12 deficiency anemia    Sacral wound     Past Medical History:   Diagnosis Date    Anxiety     Closed fracture of coccyx ([facility]) 05/24/2023    Diabetes mellitus ([facility])     GERD (gastroesophageal reflux  disease)     Hyperlipidemia     Hypertension     IBS (irritable bowel syndrome)     Shoulder fracture      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;  Surgeon: Cristian Child MD;  Location: [facility];  Service: Urology    EVACUATION OF HEMATOMA Right 2024    Procedure: EVACUATION/ DRAINAGE CHEST WALL  HEMATOMA;  Surgeon: Seth Hoyos MD;  Location: [facility];  Service: Vascular    FL RETROGRADE PYELOGRAM  2023    HERNIA REPAIR      umbilicus w/ mesh    IL BYPASS W/VEIN AXILLARY-FEMORAL Right 2024    Procedure: BYPASS AXILLO-FEMORAL, RIGHT WITH 8MM RINGED PTFE GRAFT;  Surgeon: Seth Hoyos MD;  Location: [facility];  Service: Vascular    SPLIT THICKNESS SKIN GRAFT Right 2024    Procedure: SKIN GRAFT SPLIT THICKNESS (STSG)  EXTREMITY, Wound vac dressing change;  Surgeon: Rigo Yeboah DPM;  [facility]: [facility];  Service: Podiatry    WOUND DEBRIDEMENT Right 2024    Procedure: RIGHT WOUND AND ACHILLES DEBRIDEMENT FOOT/TOE (WASH OUT); PARTIAL AMPUTATION DISTAL 2ND TOE;  Surgeon: Aaron Montero DPM;  [facility]: [facility];  Service: Podiatry    WOUND DEBRIDEMENT Right 2024    Procedure: DEBRIDEMENT RIGHT GROIN  WOUND AND WASHOUT, APPLICATION OF WOUND VAC;  Surgeon: Suly Muro DO;  [facility];  Service: Vascular    WOUND DEBRIDEMENT Right 2024    Procedure: DEBRIDEMENT LOWER EXTREMITY, washout;  Surgeon: Suly Muro DO;  [facility];  Service: Vascular      Social History     Socioeconomic History    Marital status: /Civil Union     Spouse name: None    Number of children: None    Years of education: None    Highest education level: None   Occupational History    None   Tobacco Use    Smoking status: Former     Current packs/day: 1.00     Average packs/day: 1 pack/day for 63.2 years (63.2 ttl pk-yrs)     Types: Cigarettes     Start date: 1961    Smokeless tobacco: Never    Tobacco comments:     Quit January 31, 2024   Vaping Use    Vaping status: Never Used   Substance and Sexual Activity    Alcohol use: Not Currently    Drug use: Never    Sexual activity: Not Currently   Other Topics Concern    None   Social History Narrative    None     Social Determinants of Health     Financial Resource Strain: Low Risk  (2/9/2023)    Overall Financial Resource Strain (CARDIA)     Difficulty of Paying Living Expenses: Not hard at all   Food Insecurity: No Food Insecurity (3/14/2024)    Hunger Vital Sign     Worried About Running Out of Food in the Last Year: Never true     Ran Out of Food in the Last Year: Never true   Transportation Needs: No Transportation Needs (3/5/2024)    PRAPARE - Transportation     Lack of Transportation (Medical): No     Lack of Transportation (Non-Medical): No   Physical Activity: Not on file   Stress: Not on file   Social Connections: Not on file   Intimate Partner Violence: Not on file   Housing Stability: Low Risk  (3/14/2024)    Housing Stability Vital Sign     Unable to Pay for Housing in the Last Year: No     Number of Places Lived in the Last Year: 1     Unstable Housing in the Last Year: No        Current Outpatient Medications:     acetaminophen (TYLENOL) 325 mg tablet, Take 3 tablets (975 mg total) by mouth every 8 (eight) hours, Disp: , Rfl:     albuterol (Ventolin HFA) 90 mcg/act inhaler, Inhale 2 puffs every 6 (six) hours as needed for wheezing, Disp: 18 g, Rfl: 0    Apoaequorin 20 MG CAPS, Take 20 mg by mouth in the morning, Disp:  , Rfl:     Aspirin (ASPIR-81 PO), take one by mouth daily, Disp: , Rfl:     atorvastatin (LIPITOR) 10 mg tablet, Take 1 tablet (10 mg total) by mouth daily with dinner, Disp: 90 tablet, Rfl: 1    Blood Glucose Monitoring Suppl (ONETOUCH VERIO) w/Device KIT, by Does not apply route 2 (two) times a day, Disp: 1 kit, Rfl: 0    buPROPion (WELLBUTRIN XL) 300 mg 24 hr tablet, Take 1 tablet (300 mg total) by mouth daily, Disp: 90 tablet, Rfl: 0    clonazePAM (KlonoPIN) 1 mg tablet, Take 1 tablet (1 mg total) by mouth every evening, Disp: 30 tablet, Rfl: 3    cyanocobalamin (VITAMIN B-12) 1000 MCG tablet, Take 1 tablet (1,000 mcg total) by mouth daily, Disp: , Rfl:     Fluticasone-Salmeterol (Advair Diskus) 250-50 mcg/dose inhaler, Inhale 1 puff 2 (two) times a day Rinse mouth after use., Disp: 60 blister, Rfl: 0    Lancets (OneTouch Delica Plus [id number]) MISC, TEST TWO TIMES A DAY, Disp: 100 each, Rfl: 5    levofloxacin (LEVAQUIN) 500 mg tablet, , Disp: , Rfl:     lisinopril (ZESTRIL) 5 mg tablet, Take 1 tablet (5 mg total) by mouth daily Do not start before March 14, 2024., Disp: , Rfl:     metFORMIN (GLUCOPHAGE) 1000 MG tablet, Take 1 tablet (1,000 mg total) by mouth 2 (two) times a day with meals, Disp: 30 tablet, Rfl: 0    Misc. Devices (Carex Coccyx Cushion) MISC, Use in the morning, Disp: 1 each, Rfl: 0    nicotine (NICODERM CQ) 14 mg/24hr TD 24 hr patch, Place 1 patch on the skin over 24 hours daily, Disp: 28 patch, Rfl: 0    Omega-3 Fatty Acids (FISH OIL PO), 1 cap oral daily, Disp: , Rfl:     pantoprazole (PROTONIX) 40 mg tablet, Take 40 mg by mouth daily, Disp: , Rfl:     polyethylene glycol (MIRALAX) 17 g packet, Take 17 g by mouth daily, Disp: , Rfl:     pregabalin (LYRICA) 200 MG capsule, TAKE ONE CAPSULE BY MOUTH THREE TIMES A DAY, Disp: 90 capsule, Rfl: 2    rivaroxaban (Xarelto) 2.5 mg tablet, Take 1 tablet (2.5 mg total) by mouth 2 (two) times a day, Disp: 60 tablet, Rfl: 0  No current  "facility-administered medications for this visit.  Family History   Problem Relation Age of Onset    COPD Mother     Emphysema Mother     COPD Father     Emphysema Father       Review of Systems   All other systems reviewed and are negative.      Allergies  Paroxetine, Adhesive [medical tape], Carafate [sucralfate], Sulfa antibiotics, and Sulfamethoxazole-trimethoprim    Objective:  /60   Pulse 60   Temp (!) 96.7 °F (35.9 °C)   Resp 20   Ht 5' 1\" (1.549 m)   Wt 71.2 kg (157 lb)   BMI 29.66 kg/m²     Physical Exam  Vitals reviewed.   Feet:      Right foot:      Skin integrity: Ulcer present.      Comments: Right posterior ankle ulcer probable to subcutaneous tissue with certain area of exposed Achilles tendon that were necrotic.  Nonviable tissue debrided to the level of subcutaneous tendon level with granular and bleeding wound base.  No site on the right has healed.            Wound 02/14/24 Groin Right (Active)       Wound 02/28/24 Ankle Posterior;Right (Active)   Wound Image   03/28/24 0851   Wound Description Granulation tissue;Hypergranulation;Black;Other (Comment) 03/28/24 0816   Ani-wound Assessment Dry;Scaly;Erythema 03/28/24 0816   Wound Length (cm) 6 cm 03/28/24 0816   Wound Width (cm) 2.4 cm 03/28/24 0816   Wound Depth (cm) 0.1 cm 03/28/24 0816   Wound Surface Area (cm^2) 14.4 cm^2 03/28/24 0816   Wound Volume (cm^3) 1.44 cm^3 03/28/24 0816   Calculated Wound Volume (cm^3) 1.44 cm^3 03/28/24 0816   Change in Wound Size % 70 03/28/24 0816   Drainage Amount Moderate 03/28/24 0816   Drainage Description Bloody;Brown 03/28/24 0816   Non-staged Wound Description Full thickness 03/28/24 0816   Treatments Cleansed 03/28/24 0816   Patient Tolerance Tolerated well 03/28/24 0816   Dressing Status Intact 03/28/24 0816                             Debridement   Wound 02/28/24 Ankle Posterior;Right    Universal Protocol:  Consent: Verbal consent obtained.  Risks and benefits: risks, benefits and " "alternatives were discussed  Consent given by: patient  Time out: Immediately prior to procedure a \"time out\" was called to verify the correct patient, procedure, equipment, support staff and site/side marked as required.  Patient understanding: patient states understanding of the procedure being performed  Patient identity confirmed: verbally with patient    Debridement Details  Performed by: physician  Debridement type: surgical  Level of debridement: muscle  Pain control: lidocaine 4%      Post-debridement measurements  Length (cm): 6  Width (cm): 2.5  Depth (cm): 0.3  Percent debrided: 100%  Surface Area (cm^2): 15  Area Debrided (cm^2): 15  Volume (cm^3): 4.5    Tissue and other material debrided: subcutaneous tissue and tendon  Devitalized tissue debrided: biofilm, exudate, fibrin, necrotic debris and slough  Instrument(s) utilized: blade and forceps  Bleeding: small  Hemostasis obtained with: pressure  Procedural pain (0-10): insensate  Post-procedural pain: insensate   Response to treatment: procedure was tolerated well         Results from last 6 Months   Lab Units 02/14/24  1539   WOUND CULTURE  1+ Growth of Klebsiella pneumoniae*  1+ Growth of Proteus vulgaris group*  Few Colonies of       Wound Instructions:  Orders Placed This Encounter   Procedures    Wound cleansing and dressings Posterior;Right Ankle     Right posterior ankle wound    Wash your hands with soap and water.  Remove old dressing, discard into plastic bag and place in trash.  Cleanse the wound with unscented mild soap (unscented dove) and water prior to applying a clean dressing. Do not use tissue or cotton balls. Do not scrub the wound. Pat dry using gauze.  Shower no     Apply Adaptic to the ankle wound.  Then apply Silver Alginate to the wound.  Cover with ABD Pad  Secure with mitch and tape.  Apply bulky dressing. Pad the bony prominences and lateral foot with ABD pad, 2 rolls cast padding, kerlex, coban  Apply Spandagrip F to right " "lower leg       Elastic Tubular Stocking Spandagrip F - RLE  Tubular elastic bandage: Apply from base of toes to behind the knee. Apply in AM, may remove for sleep.  Avoid prolonged standing in one place.  Elevate leg(s) above the level of the heart when sitting or as much as possible.     Change dressing 3 times per with with [facility]     Off-loading Instructions:    Keep weight and pressure off wound at all times.  Wear off-loading device as directed by your physician.   Put on immediately when rising in the morning and remove when going to bed.   Other:  float ankle to keep pressure off wound by elevating lower leg on pillows     Standing Status:   Future     Standing Expiration Date:   3/28/2025    Debridement     This order was created via procedure documentation         Jennifer Gann DPM    Portions of the record may have been created with voice recognition software. Occasional wrong word or \"sound a like\" substitutions may have occurred due to the inherent limitations of voice recognition software. Read the chart carefully and recognize, using context, where substitutions have occurred.      "

## 2024-03-28 NOTE — PATIENT INSTRUCTIONS
Orders Placed This Encounter   Procedures    Wound cleansing and dressings Posterior;Right Ankle     Right posterior ankle wound    Wash your hands with soap and water.  Remove old dressing, discard into plastic bag and place in trash.  Cleanse the wound with unscented mild soap (unscented dove) and water prior to applying a clean dressing. Do not use tissue or cotton balls. Do not scrub the wound. Pat dry using gauze.  Shower no     Apply Adaptic to the ankle wound.  Then apply Silver Alginate to the wound.  Cover with ABD Pad  Secure with mitch and tape.  Apply bulky dressing. Pad the bony prominences and lateral foot with ABD pad, 2 rolls cast padding, kerlex, coban  Apply Spandagrip F to right lower leg       Elastic Tubular Stocking Spandagrip F - RLE  Tubular elastic bandage: Apply from base of toes to behind the knee. Apply in AM, may remove for sleep.  Avoid prolonged standing in one place.  Elevate leg(s) above the level of the heart when sitting or as much as possible.     Change dressing 3 times per with with  kip [facility]     Off-loading Instructions:    Keep weight and pressure off wound at all times.  Wear off-loading device as directed by your physician.   Put on immediately when rising in the morning and remove when going to bed.   Other:  float ankle to keep pressure off wound by elevating lower leg on pillows     Standing Status:   Future     Standing Expiration Date:   3/28/2025

## 2024-03-29 ENCOUNTER — HOME CARE VISIT (OUTPATIENT)
Dept: HOME HEALTH SERVICES | Facility: HOME HEALTHCARE | Age: 79
End: 2024-03-29
Payer: COMMERCIAL

## 2024-03-29 VITALS — SYSTOLIC BLOOD PRESSURE: 112 MMHG | HEART RATE: 72 BPM | DIASTOLIC BLOOD PRESSURE: 58 MMHG | OXYGEN SATURATION: 94 %

## 2024-03-29 PROCEDURE — G0152 HHCP-SERV OF OT,EA 15 MIN: HCPCS | Performed by: OCCUPATIONAL THERAPIST

## 2024-03-29 PROCEDURE — G0151 HHCP-SERV OF PT,EA 15 MIN: HCPCS

## 2024-03-29 NOTE — CASE COMMUNICATION
Discharged from [facility] PT to HEP. Patient remains NWB R LE and is able to perform HEP indep within the limits allowed. Transfers and short distance gait with RW NWB R LE accomplished with SBA. No significnt progression, other than amb distance, anticipated until WBS increased. No current skilled need exists.  Anticipate further PT in future once WBS increased.

## 2024-03-30 ENCOUNTER — HOME CARE VISIT (OUTPATIENT)
Dept: HOME HEALTH SERVICES | Facility: HOME HEALTHCARE | Age: 79
End: 2024-03-30
Payer: COMMERCIAL

## 2024-03-30 VITALS
DIASTOLIC BLOOD PRESSURE: 72 MMHG | HEART RATE: 72 BPM | SYSTOLIC BLOOD PRESSURE: 118 MMHG | RESPIRATION RATE: 18 BRPM | OXYGEN SATURATION: 98 % | TEMPERATURE: 96.9 F

## 2024-03-30 PROCEDURE — G0299 HHS/HOSPICE OF RN EA 15 MIN: HCPCS

## 2024-03-31 VITALS — OXYGEN SATURATION: 98 % | DIASTOLIC BLOOD PRESSURE: 70 MMHG | SYSTOLIC BLOOD PRESSURE: 118 MMHG | HEART RATE: 74 BPM

## 2024-04-01 VITALS
DIASTOLIC BLOOD PRESSURE: 60 MMHG | TEMPERATURE: 98.1 F | RESPIRATION RATE: 20 BRPM | SYSTOLIC BLOOD PRESSURE: 150 MMHG | HEART RATE: 76 BPM | OXYGEN SATURATION: 96 %

## 2024-04-01 PROCEDURE — 10330064

## 2024-04-02 ENCOUNTER — HOME CARE VISIT (OUTPATIENT)
Dept: HOME HEALTH SERVICES | Facility: HOME HEALTHCARE | Age: 79
End: 2024-04-02
Payer: COMMERCIAL

## 2024-04-02 VITALS
SYSTOLIC BLOOD PRESSURE: 130 MMHG | OXYGEN SATURATION: 93 % | HEART RATE: 72 BPM | TEMPERATURE: 98.2 F | DIASTOLIC BLOOD PRESSURE: 60 MMHG | RESPIRATION RATE: 20 BRPM

## 2024-04-02 PROCEDURE — G0299 HHS/HOSPICE OF RN EA 15 MIN: HCPCS

## 2024-04-02 NOTE — CASE COMMUNICATION
OT performed discharge assessment during today's visit for plan of care.  No further OT services are planned at this time as patient has partially met OT goals and achieved optimal therapeutic benefit for [facility] OT services at this time.

## 2024-04-03 ENCOUNTER — HOME CARE VISIT (OUTPATIENT)
Dept: HOME HEALTH SERVICES | Facility: HOME HEALTHCARE | Age: 79
End: 2024-04-03
Payer: COMMERCIAL

## 2024-04-04 ENCOUNTER — HOME CARE VISIT (OUTPATIENT)
Dept: HOME HEALTH SERVICES | Facility: HOME HEALTHCARE | Age: 79
End: 2024-04-04
Payer: COMMERCIAL

## 2024-04-04 ENCOUNTER — OFFICE VISIT (OUTPATIENT)
Dept: WOUND CARE | Facility: CLINIC | Age: 79
End: 2024-04-04
Payer: COMMERCIAL

## 2024-04-04 VITALS
TEMPERATURE: 96.9 F | HEART RATE: 80 BPM | RESPIRATION RATE: 20 BRPM | DIASTOLIC BLOOD PRESSURE: 84 MMHG | SYSTOLIC BLOOD PRESSURE: 122 MMHG

## 2024-04-04 DIAGNOSIS — L97.303 DIABETIC ULCER OF ANKLE WITH NECROSIS OF MUSCLE (HCC): Primary | ICD-10-CM

## 2024-04-04 DIAGNOSIS — E11.622 DIABETIC ULCER OF ANKLE WITH NECROSIS OF MUSCLE (HCC): Primary | ICD-10-CM

## 2024-04-04 DIAGNOSIS — I73.9 PAD (PERIPHERAL ARTERY DISEASE) (HCC): ICD-10-CM

## 2024-04-04 PROCEDURE — 11043 DBRDMT MUSC&/FSCA 1ST 20/<: CPT | Performed by: STUDENT IN AN ORGANIZED HEALTH CARE EDUCATION/TRAINING PROGRAM

## 2024-04-04 PROCEDURE — 11046 DBRDMT MUSC&/FSCA EA ADDL: CPT | Performed by: STUDENT IN AN ORGANIZED HEALTH CARE EDUCATION/TRAINING PROGRAM

## 2024-04-04 RX ORDER — LIDOCAINE 40 MG/G
CREAM TOPICAL ONCE
Status: COMPLETED | OUTPATIENT
Start: 2024-04-04 | End: 2024-04-04

## 2024-04-04 RX ADMIN — LIDOCAINE: 40 CREAM TOPICAL at 09:07

## 2024-04-04 NOTE — PROGRESS NOTES
Patient ID: Lona Cedeno is a 78 y.o. female Date of Birth 1945     Diagnosis:  1. 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 PAD (peripheral artery disease) ([facility])       Diagnosis ICD-10-CM Associated Orders   1. 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 PAD (peripheral artery disease) ([facility])  I73.9          -I recommend close monitoring with local wound care as detailed below  -Discussed importance of strict offloading to the wound area  -Discussed importance of glycemic control proper protein intake  -Patient was educated on clinical signs of wound infection if present she is to call my office immediately  -Return in 1 week for follow-up    Chief Complaint   Patient presents with    Follow Up Wound Care Visit     Right ankle wound              Subjective:   78-year-old female with past medical history as below presents for evaluation of right diabetic posterior ankle ulcer complicated by pressure and diabetes with exposed Achilles tendon.  No other complaints.        The following portions of the patient's history were reviewed and updated as appropriate:   Patient Active Problem List   Diagnosis    Type 2 diabetes mellitus with diabetic polyneuropathy (HCC)    Essential hypertension    Anxiety and depression    GERD without esophagitis    Urinary incontinence    Degenerative lumbar disc    Lactic acidosis    Hypomagnesemia    Bladder neoplasm    Left renal mass    PAD (peripheral artery disease) ([facility])    Abnormal CT of the chest    COPD, severity to be determined ([facility])    Tobacco use disorder    Age-related osteoporosis without current pathological fracture    Ankle ulcer, right, with fat layer exposed ([facility])    Diabetic foot ulcer with osteomyelitis ([facility])    Preoperative cardiovascular examination    Constipation     Hematoma    Anemia    At risk for venous thromboembolism (VTE)    At high risk for skin breakdown    Wound of skin    Impaired mobility and activities of daily living    Leukocytosis    Surgical site infection    Sepsis without acute organ dysfunction (HCC)    Other dietary vitamin B12 deficiency anemia    Sacral wound     Past Medical History:   Diagnosis Date    Anxiety     Closed fracture of coccyx (HCC) 2023    Diabetes mellitus (HCC)     GERD (gastroesophageal reflux disease)     Hyperlipidemia     Hypertension     IBS (irritable bowel syndrome)     Shoulder fracture      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;  Surgeon: Cristian Child MD;  Location: [facility];  Service: Urology    EVACUATION OF HEMATOMA Right 2024    Procedure: EVACUATION/ DRAINAGE CHEST WALL  HEMATOMA;  Surgeon: Seth Hoyos MD;  Location: [facility];  Service: Vascular    FL RETROGRADE PYELOGRAM  2023    HERNIA REPAIR      umbilicus w/ mesh    MO BYPASS W/VEIN AXILLARY-FEMORAL Right 2024    Procedure: BYPASS AXILLO-FEMORAL, RIGHT WITH 8MM RINGED PTFE GRAFT;  Surgeon: Seth Hoyos MD;  Location: [facility];  Service: Vascular    SPLIT THICKNESS SKIN GRAFT Right 2024    Procedure: SKIN GRAFT SPLIT THICKNESS (STSG)  EXTREMITY, Wound vac dressing change;  Surgeon: Rigo Yeboah DPM;  Location: [facility];  Service: Podiatry    WOUND DEBRIDEMENT Right 2024    Procedure: RIGHT WOUND AND ACHILLES DEBRIDEMENT  FOOT/TOE (WASH OUT); PARTIAL AMPUTATION DISTAL 2ND TOE;  Surgeon: Aaron Montero DPM;  Location: [facility];  Service: Podiatry    WOUND DEBRIDEMENT Right 2/14/2024    Procedure: DEBRIDEMENT RIGHT GROIN  WOUND AND WASHOUT, APPLICATION OF WOUND VAC;  Surgeon: Suly Muro DO;  [facility]: [facility];  Service: Vascular    WOUND DEBRIDEMENT Right 2/19/2024    Procedure: DEBRIDEMENT LOWER EXTREMITY, washout;  Surgeon: Suly Muro DO;  [facility]: [facility];  Service: Vascular     Social History     Socioeconomic History    Marital status: /Civil Union     Spouse name: None    Number of children: None    Years of education: None    Highest education level: None   Occupational History    None   Tobacco Use    Smoking status: Former     Current packs/day: 1.00     Average packs/day: 1 pack/day for 63.3 years (63.3 ttl pk-yrs)     Types: Cigarettes     Start date: 1961    Smokeless tobacco: Never    Tobacco comments:     Quit January 31, 2024   Vaping Use    Vaping status: Never Used   Substance and Sexual Activity    Alcohol use: Not Currently    Drug use: Never    Sexual activity: Not Currently   Other Topics Concern    None   Social History Narrative    None     Social Determinants of Health     Financial Resource Strain: Low Risk  (2/9/2023)    Overall Financial Resource Strain (CARDIA)     Difficulty of Paying Living Expenses: Not hard at all   Food Insecurity: No Food Insecurity (3/14/2024)    Hunger Vital Sign     Worried About Running Out of Food in the Last Year: Never true     Ran Out of Food in the Last Year: Never true   Transportation Needs: No Transportation Needs (3/5/2024)    PRAPARE - Transportation     Lack of Transportation (Medical): No     Lack of Transportation (Non-Medical): No   Physical Activity: Not on file   Stress: Not on file   Social Connections: Not on file   Intimate Partner Violence: Not on file   Housing Stability: Low Risk  (3/14/2024)    Housing Stability Vital Sign      Unable to Pay for Housing in the Last Year: No     Number of Places Lived in the Last Year: 1     Unstable Housing in the Last Year: No        Current Outpatient Medications:     acetaminophen (TYLENOL) 325 mg tablet, Take 3 tablets (975 mg total) by mouth every 8 (eight) hours, Disp: , Rfl:     albuterol (Ventolin HFA) 90 mcg/act inhaler, Inhale 2 puffs every 6 (six) hours as needed for wheezing, Disp: 18 g, Rfl: 0    Apoaequorin 20 MG CAPS, Take 20 mg by mouth in the morning, Disp: , Rfl:     Aspirin (ASPIR-81 PO), take one by mouth daily, Disp: , Rfl:     atorvastatin (LIPITOR) 10 mg tablet, Take 1 tablet (10 mg total) by mouth daily with dinner, Disp: 90 tablet, Rfl: 1    Blood Glucose Monitoring Suppl (ONETOUCH VERIO) w/Device KIT, by Does not apply route 2 (two) times a day, Disp: 1 kit, Rfl: 0    buPROPion (WELLBUTRIN XL) 300 mg 24 hr tablet, Take 1 tablet (300 mg total) by mouth daily, Disp: 90 tablet, Rfl: 0    clonazePAM (KlonoPIN) 1 mg tablet, Take 1 tablet (1 mg total) by mouth every evening, Disp: 30 tablet, Rfl: 3    cyanocobalamin (VITAMIN B-12) 1000 MCG tablet, Take 1 tablet (1,000 mcg total) by mouth daily, Disp: , Rfl:     Fluticasone-Salmeterol (Advair Diskus) 250-50 mcg/dose inhaler, Inhale 1 puff 2 (two) times a day Rinse mouth after use., Disp: 60 blister, Rfl: 0    Lancets (OneTouch Delica Plus [id number]) MISC, TEST TWO TIMES A DAY, Disp: 100 each, Rfl: 5    levofloxacin (LEVAQUIN) 500 mg tablet, , Disp: , Rfl:     lisinopril (ZESTRIL) 5 mg tablet, Take 1 tablet (5 mg total) by mouth daily Do not start before March 14, 2024., Disp: , Rfl:     metFORMIN (GLUCOPHAGE) 1000 MG tablet, Take 1 tablet (1,000 mg total) by mouth 2 (two) times a day with meals, Disp: 30 tablet, Rfl: 0    Misc. Devices (Carex Coccyx Cushion) MISC, Use in the morning, Disp: 1 each, Rfl: 0    nicotine (NICODERM CQ) 14 mg/24hr TD 24 hr patch, Place 1 patch on the skin over 24 hours daily, Disp: 28 patch, Rfl: 0    Omega-3  Fatty Acids (FISH OIL PO), 1 cap oral daily, Disp: , Rfl:     pantoprazole (PROTONIX) 40 mg tablet, Take 40 mg by mouth daily, Disp: , Rfl:     polyethylene glycol (MIRALAX) 17 g packet, Take 17 g by mouth daily, Disp: , Rfl:     pregabalin (LYRICA) 200 MG capsule, TAKE ONE CAPSULE BY MOUTH THREE TIMES A DAY, Disp: 90 capsule, Rfl: 2    rivaroxaban (Xarelto) 2.5 mg tablet, Take 1 tablet (2.5 mg total) by mouth 2 (two) times a day, Disp: 60 tablet, Rfl: 0  No current facility-administered medications for this visit.  Family History   Problem Relation Age of Onset    COPD Mother     Emphysema Mother     COPD Father     Emphysema Father       Review of Systems   All other systems reviewed and are negative.    Allergies:  Paroxetine, Adhesive [medical tape], Carafate [sucralfate], Sulfa antibiotics, and Sulfamethoxazole-trimethoprim      Objective:  /84   Pulse 80   Temp (!) 96.9 °F (36.1 °C)   Resp 20     Physical Exam  Vitals reviewed.   Feet:      Right foot:      Skin integrity: Ulcer present.      Comments:  Right posterior ankle ulcer probable to subcutaneous tissue with certain area of exposed Achilles tendon that are nonviable.  Nonviable tissue debrided to the level of subcutaneous tendon level with granular and bleeding wound base.  No site on the right has healed.            Wound 02/14/24 Groin Right (Active)       Wound 02/28/24 Diabetic Ulcer Ankle Posterior;Right (Active)   Wound Image   04/04/24 0840   Wound Description Granulation tissue;Hypergranulation;Yellow;Slough;Pink 04/04/24 0817   Ani-wound Assessment Erythema;Maceration;Edema 04/04/24 0817   Wound Length (cm) 6.7 cm 04/04/24 0817   Wound Width (cm) 3 cm 04/04/24 0817   Wound Depth (cm) 0.1 cm 04/04/24 0817   Wound Surface Area (cm^2) 20.1 cm^2 04/04/24 0817   Wound Volume (cm^3) 2.01 cm^3 04/04/24 0817   Calculated Wound Volume (cm^3) 2.01 cm^3 04/04/24 0817   Change in Wound Size % 58.13 04/04/24 0817   Drainage Amount Moderate  "04/04/24 0817   Drainage Description Bloody;Yellow;Serous;Brown 04/04/24 0817   Non-staged Wound Description Full thickness 04/04/24 0817   Treatments Cleansed 04/04/24 0817   Patient Tolerance Tolerated well 04/04/24 0817   Dressing Status Intact 04/04/24 0817                         Debridement   Wound 02/28/24 Diabetic Ulcer Ankle Posterior;Right    Universal Protocol:  Consent: Verbal consent obtained.  Risks and benefits: risks, benefits and alternatives were discussed  Consent given by: patient  Time out: Immediately prior to procedure a \"time out\" was called to verify the correct patient, procedure, equipment, support staff and site/side marked as required.  Patient understanding: patient states understanding of the procedure being performed  Patient identity confirmed: verbally with patient    Debridement Details  Performed by: physician  Debridement type: surgical  Level of debridement: muscle  Pain control: lidocaine 4%      Post-debridement measurements  Length (cm): 6.7  Width (cm): 3  Depth (cm): 0.3  Percent debrided: 100%  Surface Area (cm^2): 20.1  Area Debrided (cm^2): 20.1  Volume (cm^3): 6.03    Tissue and other material debrided: subcutaneous tissue and tendon  Devitalized tissue debrided: biofilm, exudate, fibrin, necrotic debris and slough  Instrument(s) utilized: blade, curette and forceps  Bleeding: medium  Hemostasis obtained with: pressure  Procedural pain (0-10): insensate  Post-procedural pain: insensate   Response to treatment: procedure was tolerated well         Results from last 6 Months   Lab Units 02/14/24  1539   WOUND CULTURE  1+ Growth of Klebsiella pneumoniae*  1+ Growth of Proteus vulgaris group*  Few Colonies of         Wound Instructions:  Orders Placed This Encounter   Procedures    Wound cleansing and dressings     Right posterior ankle wound    Wash your hands with soap and water.  Remove old dressing, discard into plastic bag and place in trash.  Cleanse the wound with " "unscented mild soap (unscented dove) and water prior to applying a clean dressing. Do not use tissue or cotton balls. Do not scrub the wound. Pat dry using gauze.  Shower no    Apply Adaptic to the ankle wound.  Then apply Silver Alginate to the wound.  Cover with ABD Pad  Secure with mitch and tape.  Apply bulky dressing. Pad the bony prominences and lateral foot with ABD pad, 2 rolls cast padding, kerlex, coban  Apply Spandagrip F to right lower leg      Elastic Tubular Stocking Spandagrip F - RLE  Tubular elastic bandage: Apply from base of toes to behind the knee. Apply in AM, may remove for sleep.  Avoid prolonged standing in one place.  Elevate leg(s) above the level of the heart when sitting or as much as possible.    Change dressing 3 times per with with [facility]    Off-loading Instructions:    Keep weight and pressure off wound at all times.  Wear off-loading device as directed by your physician.  Put on immediately when rising in the morning and remove when going to bed.  Other:  float ankle to keep pressure off wound by elevating lower leg on pillows     Standing Status:   Future     Standing Expiration Date:   4/4/2025         Jennifer Gann DPM      Portions of the record may have been created with voice recognition software. Occasional wrong word or \"sound a like\" substitutions may have occurred due to the inherent limitations of voice recognition software. Read the chart carefully and recognize, using context, where substitutions have occurred.      "

## 2024-04-04 NOTE — PATIENT INSTRUCTIONS
Orders Placed This Encounter   Procedures    Wound cleansing and dressings     Right posterior ankle wound    Wash your hands with soap and water.  Remove old dressing, discard into plastic bag and place in trash.  Cleanse the wound with unscented mild soap (unscented dove) and water prior to applying a clean dressing. Do not use tissue or cotton balls. Do not scrub the wound. Pat dry using gauze.  Shower no    Apply Adaptic to the ankle wound.  Then apply Silver Alginate to the wound.  Cover with ABD Pad  Secure with mitch and tape.  Apply bulky dressing. Pad the bony prominences and lateral foot with ABD pad, 2 rolls cast padding, kerlex, coban  Apply Spandagrip F to right lower leg      Elastic Tubular Stocking Spandagrip F - RLE  Tubular elastic bandage: Apply from base of toes to behind the knee. Apply in AM, may remove for sleep.  Avoid prolonged standing in one place.  Elevate leg(s) above the level of the heart when sitting or as much as possible.    Change dressing 3 times per with with [facility]    Off-loading Instructions:    Keep weight and pressure off wound at all times.  Wear off-loading device as directed by your physician.  Put on immediately when rising in the morning and remove when going to bed.  Other:  float ankle to keep pressure off wound by elevating lower leg on pillows     Standing Status:   Future     Standing Expiration Date:   4/4/2025

## 2024-04-05 ENCOUNTER — HOME CARE VISIT (OUTPATIENT)
Dept: HOME HEALTH SERVICES | Facility: HOME HEALTHCARE | Age: 79
End: 2024-04-05
Payer: COMMERCIAL

## 2024-04-06 ENCOUNTER — HOME CARE VISIT (OUTPATIENT)
Dept: HOME HEALTH SERVICES | Facility: HOME HEALTHCARE | Age: 79
End: 2024-04-06
Payer: COMMERCIAL

## 2024-04-06 VITALS
HEART RATE: 76 BPM | SYSTOLIC BLOOD PRESSURE: 110 MMHG | TEMPERATURE: 97.4 F | OXYGEN SATURATION: 96 % | DIASTOLIC BLOOD PRESSURE: 62 MMHG | RESPIRATION RATE: 20 BRPM

## 2024-04-06 PROCEDURE — G0299 HHS/HOSPICE OF RN EA 15 MIN: HCPCS

## 2024-04-07 ENCOUNTER — HOME CARE VISIT (OUTPATIENT)
Dept: HOME HEALTH SERVICES | Facility: HOME HEALTHCARE | Age: 79
End: 2024-04-07
Payer: COMMERCIAL

## 2024-04-08 ENCOUNTER — HOME CARE VISIT (OUTPATIENT)
Dept: HOME HEALTH SERVICES | Facility: HOME HEALTHCARE | Age: 79
End: 2024-04-08
Payer: COMMERCIAL

## 2024-04-08 PROCEDURE — 10330064

## 2024-04-08 PROCEDURE — G0299 HHS/HOSPICE OF RN EA 15 MIN: HCPCS

## 2024-04-08 NOTE — CASE COMMUNICATION
Ship to Clinician  Ordering MD Jennifer Gann (home health only)   Wound 1 rle Full x Frequency 3x a week   Wound 2 right groin Full x Frequency daily     Coban 4 in  [id number]

## 2024-04-08 NOTE — CASE COMMUNICATION
Ship to Pt. Home   Ordering MD Jennifer Gann (home health only)   Wound 1 rle Full x Frequency 3x a week   Wound 2 right groin Full x Frequency daily     Coban 4 in  [id number]

## 2024-04-11 ENCOUNTER — HOME CARE VISIT (OUTPATIENT)
Dept: HOME HEALTH SERVICES | Facility: HOME HEALTHCARE | Age: 79
End: 2024-04-11
Payer: COMMERCIAL

## 2024-04-11 ENCOUNTER — OFFICE VISIT (OUTPATIENT)
Dept: WOUND CARE | Facility: CLINIC | Age: 79
End: 2024-04-11
Payer: COMMERCIAL

## 2024-04-11 VITALS
TEMPERATURE: 97.2 F | HEART RATE: 82 BPM | SYSTOLIC BLOOD PRESSURE: 130 MMHG | RESPIRATION RATE: 19 BRPM | DIASTOLIC BLOOD PRESSURE: 80 MMHG

## 2024-04-11 DIAGNOSIS — L97.303 DIABETIC ULCER OF ANKLE WITH NECROSIS OF MUSCLE (HCC): ICD-10-CM

## 2024-04-11 DIAGNOSIS — E11.622 DIABETIC ULCER OF ANKLE WITH NECROSIS OF MUSCLE (HCC): ICD-10-CM

## 2024-04-11 DIAGNOSIS — I73.9 PAD (PERIPHERAL ARTERY DISEASE) (HCC): Primary | ICD-10-CM

## 2024-04-11 PROCEDURE — 11042 DBRDMT SUBQ TIS 1ST 20SQCM/<: CPT | Performed by: STUDENT IN AN ORGANIZED HEALTH CARE EDUCATION/TRAINING PROGRAM

## 2024-04-11 RX ORDER — LIDOCAINE 40 MG/G
CREAM TOPICAL ONCE
Status: COMPLETED | OUTPATIENT
Start: 2024-04-11 | End: 2024-04-11

## 2024-04-11 RX ADMIN — LIDOCAINE 1 APPLICATION: 40 CREAM TOPICAL at 08:41

## 2024-04-11 NOTE — PROGRESS NOTES
Patient ID: Lona Cedeno is a 78 y.o. female Date of Birth 1945     Diagnosis:  1. PAD (peripheral artery disease) ([facility])  -     VAS ARTERIAL DUPLEX- LOWER LIMB BILATERAL; Future; Expected date: 04/11/2024    2. 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 PAD (peripheral artery disease) ([facility])  I73.9 VAS ARTERIAL DUPLEX- LOWER LIMB BILATERAL      2. 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 Order for arterial duplex in.   -Discussed importance of strict offloading to the wound area  -Discussed importance of glycemic control proper protein intake  -Patient was educated on clinical signs of wound infection if present she is to call my office immediately  -Return in 1 week for follow-up    Chief Complaint   Patient presents with    Follow Up Wound Care Visit           Subjective:   Patient presents with daughter for continued evaluation of right posterior ankle ulcer complicated by pressure diabetes peripheral arterial disease.  Patient has no other complaints.        The following portions of the patient's history were reviewed and updated as appropriate:   Patient Active Problem List   Diagnosis    Type 2 diabetes mellitus with diabetic polyneuropathy ([facility])    Essential hypertension    Anxiety and depression    GERD without esophagitis    Urinary incontinence    Degenerative lumbar disc    Lactic  acidosis    Hypomagnesemia    Bladder neoplasm    Left renal mass    PAD (peripheral artery disease) ([facility])    Abnormal CT of the chest    COPD, severity to be determined ([facility])    Tobacco use disorder    Age-related osteoporosis without current pathological fracture    Ankle ulcer, right, with fat layer exposed ([facility])    Diabetic foot ulcer with osteomyelitis ([facility])    Preoperative cardiovascular examination    Constipation    Hematoma    Anemia    At risk for venous thromboembolism (VTE)    At high risk for skin breakdown    Wound of skin    Impaired mobility and activities of daily living    Leukocytosis    Surgical site infection    Sepsis without acute organ dysfunction ([facility]CC)    Other dietary vitamin B12 deficiency anemia    Sacral wound     Past Medical History:   Diagnosis Date    Anxiety     Closed fracture of coccyx (HCC) 2023    Diabetes mellitus (HCC)     GERD (gastroesophageal reflux disease)     Hyperlipidemia     Hypertension     IBS (irritable bowel syndrome)     Shoulder fracture      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;  Surgeon: Cristian Child MD;  Location: [facility];  Service: Urology    EVACUATION OF HEMATOMA Right 2024    Procedure: EVACUATION/ DRAINAGE CHEST WALL  HEMATOMA;  Surgeon: Seth Hoyos MD;  Location: [facility];  Service: Vascular    FL RETROGRADE PYELOGRAM  2023    HERNIA REPAIR      umbilicus w/ mesh    NJ BYPASS W/VEIN AXILLARY-FEMORAL Right 2024    Procedure: BYPASS  AXILLO-FEMORAL, RIGHT WITH 8MM RINGED PTFE GRAFT;  Surgeon: Seth Hoyos MD;  [facility]: [facility];  Service: Vascular    SPLIT THICKNESS SKIN GRAFT Right 2/28/2024    Procedure: SKIN GRAFT SPLIT THICKNESS (STSG)  EXTREMITY, Wound vac dressing change;  Surgeon: Rigo Yeboah DPM;  Location: [facility];  Service: Podiatry    WOUND DEBRIDEMENT Right 2/4/2024    Procedure: RIGHT WOUND AND ACHILLES DEBRIDEMENT FOOT/TOE (WASH OUT); PARTIAL AMPUTATION DISTAL 2ND TOE;  Surgeon: Aaron Montero DPM;  Location: [facility];  Service: Podiatry    WOUND DEBRIDEMENT Right 2/14/2024    Procedure: DEBRIDEMENT RIGHT GROIN  WOUND AND WASHOUT, APPLICATION OF WOUND VAC;  Surgeon: Suly Muro DO;  [facility];  Service: Vascular    WOUND DEBRIDEMENT Right 2/19/2024    Procedure: DEBRIDEMENT LOWER EXTREMITY, washout;  Surgeon: Suly Muro DO;  [facility];  Service: Vascular     Social History     Socioeconomic History    Marital status: /Civil Union     Spouse name: None    Number of children: None    Years of education: None    Highest education level: None   Occupational History    None   Tobacco Use    Smoking status: Former     Current packs/day: 1.00     Average packs/day: 1 pack/day for 63.3 years (63.3 ttl pk-yrs)     Types: Cigarettes     Start date: 1961    Smokeless tobacco: Never    Tobacco comments:     Quit January 31, 2024   Vaping Use    Vaping status: Never Used   Substance and Sexual Activity    Alcohol use: Not Currently    Drug use: Never    Sexual activity: Not Currently   Other Topics Concern    None   Social History Narrative    None     Social Determinants of Health     Financial Resource Strain: Low Risk  (2/9/2023)    Overall Financial Resource Strain (CARDIA)     Difficulty of Paying Living Expenses: Not hard at all   Food Insecurity: No Food Insecurity (3/14/2024)    Hunger Vital Sign     Worried About Running Out of Food in the Last Year: Never true      Ran Out of Food in the Last Year: Never true   Transportation Needs: No Transportation Needs (3/5/2024)    PRAPARE - Transportation     Lack of Transportation (Medical): No     Lack of Transportation (Non-Medical): No   Physical Activity: Not on file   Stress: Not on file   Social Connections: Not on file   Intimate Partner Violence: Not on file   Housing Stability: Low Risk  (3/14/2024)    Housing Stability Vital Sign     Unable to Pay for Housing in the Last Year: No     Number of Places Lived in the Last Year: 1     Unstable Housing in the Last Year: No        Current Outpatient Medications:     acetaminophen (TYLENOL) 325 mg tablet, Take 3 tablets (975 mg total) by mouth every 8 (eight) hours, Disp: , Rfl:     albuterol (Ventolin HFA) 90 mcg/act inhaler, Inhale 2 puffs every 6 (six) hours as needed for wheezing, Disp: 18 g, Rfl: 0    Apoaequorin 20 MG CAPS, Take 20 mg by mouth in the morning, Disp: , Rfl:     Aspirin (ASPIR-81 PO), take one by mouth daily, Disp: , Rfl:     atorvastatin (LIPITOR) 10 mg tablet, Take 1 tablet (10 mg total) by mouth daily with dinner, Disp: 90 tablet, Rfl: 1    Blood Glucose Monitoring Suppl (ONETOUCH VERIO) w/Device KIT, by Does not apply route 2 (two) times a day, Disp: 1 kit, Rfl: 0    buPROPion (WELLBUTRIN XL) 300 mg 24 hr tablet, Take 1 tablet (300 mg total) by mouth daily, Disp: 90 tablet, Rfl: 0    clonazePAM (KlonoPIN) 1 mg tablet, Take 1 tablet (1 mg total) by mouth every evening, Disp: 30 tablet, Rfl: 3    cyanocobalamin (VITAMIN B-12) 1000 MCG tablet, Take 1 tablet (1,000 mcg total) by mouth daily, Disp: , Rfl:     Fluticasone-Salmeterol (Advair Diskus) 250-50 mcg/dose inhaler, Inhale 1 puff 2 (two) times a day Rinse mouth after use., Disp: 60 blister, Rfl: 0    Lancets (OneTouch Delica Plus [id number]) MISC, TEST TWO TIMES A DAY, Disp: 100 each, Rfl: 5    levofloxacin (LEVAQUIN) 500 mg tablet, , Disp: , Rfl:     lisinopril (ZESTRIL) 5 mg tablet, Take 1 tablet (5 mg total)  by mouth daily Do not start before March 14, 2024., Disp: , Rfl:     metFORMIN (GLUCOPHAGE) 1000 MG tablet, Take 1 tablet (1,000 mg total) by mouth 2 (two) times a day with meals, Disp: 30 tablet, Rfl: 0    Misc. Devices (Carex Coccyx Cushion) MISC, Use in the morning, Disp: 1 each, Rfl: 0    nicotine (NICODERM CQ) 14 mg/24hr TD 24 hr patch, Place 1 patch on the skin over 24 hours daily, Disp: 28 patch, Rfl: 0    Omega-3 Fatty Acids (FISH OIL PO), 1 cap oral daily, Disp: , Rfl:     pantoprazole (PROTONIX) 40 mg tablet, Take 40 mg by mouth daily, Disp: , Rfl:     polyethylene glycol (MIRALAX) 17 g packet, Take 17 g by mouth daily, Disp: , Rfl:     pregabalin (LYRICA) 200 MG capsule, TAKE ONE CAPSULE BY MOUTH THREE TIMES A DAY, Disp: 90 capsule, Rfl: 2    rivaroxaban (Xarelto) 2.5 mg tablet, Take 1 tablet (2.5 mg total) by mouth 2 (two) times a day, Disp: 60 tablet, Rfl: 0  No current facility-administered medications for this visit.  Family History   Problem Relation Age of Onset    COPD Mother     Emphysema Mother     COPD Father     Emphysema Father       Review of Systems   All other systems reviewed and are negative.    Allergies:  Paroxetine, Adhesive [medical tape], Carafate [sucralfate], Sulfa antibiotics, and Sulfamethoxazole-trimethoprim      Objective:  /80   Pulse 82   Temp (!) 97.2 °F (36.2 °C)   Resp 19     Physical Exam  Vitals reviewed.   Feet:      Right foot:      Skin integrity: Ulcer present.      Comments: Right posterior ankle ulcer probable to subcutaneous tissue and Achilles tendon.  Hypergranulation tissue noted.  Postdebridement wound was bleeding and 100% granular.            Wound 02/14/24 Groin Right (Active)       Wound 02/28/24 Diabetic Ulcer Ankle Posterior;Right (Active)   Wound Image   04/11/24 0859   Wound Description Hypergranulation;Granulation tissue;Slough;White 04/11/24 0832   Ani-wound Assessment Maceration;Pink 04/11/24 0832   Wound Length (cm) 5.4 cm 04/11/24 0832  "  Wound Width (cm) 2.5 cm 04/11/24 0832   Wound Depth (cm) 0.3 cm 04/11/24 0832   Wound Surface Area (cm^2) 13.5 cm^2 04/11/24 0832   Wound Volume (cm^3) 4.05 cm^3 04/11/24 0832   Calculated Wound Volume (cm^3) 4.05 cm^3 04/11/24 0832   Change in Wound Size % 15.63 04/11/24 0832   Drainage Amount Large 04/11/24 0832   Drainage Description Serosanguineous;Green;Brown;Foul smelling 04/11/24 0832   Non-staged Wound Description Full thickness 04/11/24 0832   Treatments Cleansed 04/11/24 0832   Patient Tolerance Tolerated well 04/11/24 0832   Dressing Status Intact 04/11/24 0832                         Debridement   Wound 02/28/24 Diabetic Ulcer Ankle Posterior;Right    Universal Protocol:  Consent: Verbal consent obtained.  Risks and benefits: risks, benefits and alternatives were discussed  Consent given by: patient  Time out: Immediately prior to procedure a \"time out\" was called to verify the correct patient, procedure, equipment, support staff and site/side marked as required.  Patient understanding: patient states understanding of the procedure being performed  Patient identity confirmed: verbally with patient    Debridement Details  Performed by: physician  Debridement type: surgical  Level of debridement: subcutaneous tissue  Pain control: lidocaine 4%      Post-debridement measurements  Length (cm): 5.5  Width (cm): 2.5  Depth (cm): 0.2  Percent debrided: 100%  Surface Area (cm^2): 13.75  Area Debrided (cm^2): 13.75  Volume (cm^3): 2.75    Tissue and other material debrided: subcutaneous tissue  Devitalized tissue debrided: biofilm, exudate, fibrin and slough  Instrument(s) utilized: blade  Bleeding: medium  Hemostasis obtained with: pressure  Procedural pain (0-10): insensate  Post-procedural pain: insensate   Response to treatment: procedure was tolerated well         Results from last 6 Months   Lab Units 02/14/24  1539   WOUND CULTURE  1+ Growth of Klebsiella pneumoniae*  1+ Growth of Proteus vulgaris " "group*  Few Colonies of         Wound Instructions:  Orders Placed This Encounter   Procedures    Wound Procedure Treatment Diabetic Ulcer Posterior;Right Ankle     This order was created via procedure documentation    Wound cleansing and dressings Diabetic Ulcer Posterior;Right Ankle     Right posterior ankle wound     Wash your hands with soap and water.  Remove old dressing, discard into plastic bag and place in trash.  Cleanse the wound with unscented mild soap (unscented dove) and water prior to applying a clean dressing. Do not use tissue or cotton balls. Do not scrub the wound. Pat dry using gauze.  Shower no     Apply Adaptic to the ankle wound.  Then apply Silver Alginate to the wound.  Cover with ABD Pad  Secure with mitch and tape.  Apply bulky dressing. Pad the bony prominences and lateral foot with ABD pad, 2 rolls cast padding, kerlex, coban  Apply Spandagrip F to right lower leg        Elastic Tubular Stocking Spandagrip F - RLE  Tubular elastic bandage: Apply from base of toes to behind the knee. Apply in AM, may remove for sleep.  Avoid prolonged standing in one place.  Elevate leg(s) above the level of the heart when sitting or as much as possible.     Change dressing 3 times per with with [facility]     Off-loading Instructions:     Keep weight and pressure off wound at all times.  Wear off-loading device as directed by your physician.  Put on immediately when rising in the morning and remove when going to bed.  Other:  float ankle to keep pressure off wound by elevating lower leg on pillows.    Dr Gann ordering vascular studies . To be scheduled as soon as possible.     Standing Status:   Future     Standing Expiration Date:   4/18/2024         Jennifer Gann DPM      Portions of the record may have been created with voice recognition software. Occasional wrong word or \"sound a like\" substitutions may have occurred due to the inherent limitations of voice recognition software. Read the chart " carefully and recognize, using context, where substitutions have occurred.

## 2024-04-11 NOTE — PATIENT INSTRUCTIONS
Orders Placed This Encounter   Procedures    Wound Procedure Treatment     This order was created via procedure documentation    Wound cleansing and dressings Diabetic Ulcer Posterior;Right Ankle     Right posterior ankle wound     Wash your hands with soap and water.  Remove old dressing, discard into plastic bag and place in trash.  Cleanse the wound with unscented mild soap (unscented dove) and water prior to applying a clean dressing. Do not use tissue or cotton balls. Do not scrub the wound. Pat dry using gauze.  Shower no     Apply Adaptic to the ankle wound.  Then apply Silver Alginate to the wound.  Cover with ABD Pad  Secure with mitch and tape.  Apply bulky dressing. Pad the bony prominences and lateral foot with ABD pad, 2 rolls cast padding, kerlex, coban  Apply Spandagrip F to right lower leg        Elastic Tubular Stocking Spandagrip F - RLE  Tubular elastic bandage: Apply from base of toes to behind the knee. Apply in AM, may remove for sleep.  Avoid prolonged standing in one place.  Elevate leg(s) above the level of the heart when sitting or as much as possible.     Change dressing 3 times per with with [facility]     Off-loading Instructions:     Keep weight and pressure off wound at all times.  Wear off-loading device as directed by your physician.  Put on immediately when rising in the morning and remove when going to bed.  Other:  float ankle to keep pressure off wound by elevating lower leg on pillows.    Dr Gann ordering vascular studies . To be scheduled as soon as possible.     Standing Status:   Future     Standing Expiration Date:   4/18/2024

## 2024-04-11 NOTE — PROGRESS NOTES
Wound Procedure Treatment Diabetic Ulcer Posterior;Right Ankle    Performed by: Maxine Cristobal LPN  Authorized by: Jennifer Gann DPM  Associated wounds:   Wound 02/28/24 Diabetic Ulcer Ankle Posterior;Right  Wound cleansed with:  NSS  Applied primary dressing:  Other  Applied secondary dressing:  Gauze and ABD  Wound secured with:  Tape, Kerlix and Tubular bandage    Adaptic silver alginate   Spandagrip F

## 2024-04-13 ENCOUNTER — HOME CARE VISIT (OUTPATIENT)
Dept: HOME HEALTH SERVICES | Facility: HOME HEALTHCARE | Age: 79
End: 2024-04-13
Payer: COMMERCIAL

## 2024-04-13 VITALS — RESPIRATION RATE: 18 BRPM | OXYGEN SATURATION: 97 % | HEART RATE: 78 BPM | TEMPERATURE: 98.4 F

## 2024-04-13 PROCEDURE — G0299 HHS/HOSPICE OF RN EA 15 MIN: HCPCS

## 2024-04-15 ENCOUNTER — HOME CARE VISIT (OUTPATIENT)
Dept: HOME HEALTH SERVICES | Facility: HOME HEALTHCARE | Age: 79
End: 2024-04-15
Payer: COMMERCIAL

## 2024-04-15 VITALS
OXYGEN SATURATION: 95 % | SYSTOLIC BLOOD PRESSURE: 102 MMHG | DIASTOLIC BLOOD PRESSURE: 60 MMHG | HEART RATE: 76 BPM | RESPIRATION RATE: 20 BRPM | TEMPERATURE: 97.8 F

## 2024-04-15 VITALS
DIASTOLIC BLOOD PRESSURE: 56 MMHG | TEMPERATURE: 97.7 F | HEART RATE: 72 BPM | SYSTOLIC BLOOD PRESSURE: 118 MMHG | RESPIRATION RATE: 20 BRPM | OXYGEN SATURATION: 93 %

## 2024-04-15 PROCEDURE — G0299 HHS/HOSPICE OF RN EA 15 MIN: HCPCS

## 2024-04-17 ENCOUNTER — HOME CARE VISIT (OUTPATIENT)
Dept: HOME HEALTH SERVICES | Facility: HOME HEALTHCARE | Age: 79
End: 2024-04-17
Payer: COMMERCIAL

## 2024-04-18 ENCOUNTER — HOME CARE VISIT (OUTPATIENT)
Dept: HOME HEALTH SERVICES | Facility: HOME HEALTHCARE | Age: 79
End: 2024-04-18
Payer: COMMERCIAL

## 2024-04-18 ENCOUNTER — OFFICE VISIT (OUTPATIENT)
Dept: FAMILY MEDICINE CLINIC | Facility: CLINIC | Age: 79
End: 2024-04-18
Payer: COMMERCIAL

## 2024-04-18 ENCOUNTER — OFFICE VISIT (OUTPATIENT)
Dept: WOUND CARE | Facility: CLINIC | Age: 79
End: 2024-04-18
Payer: COMMERCIAL

## 2024-04-18 VITALS
RESPIRATION RATE: 18 BRPM | BODY MASS INDEX: 29.66 KG/M2 | SYSTOLIC BLOOD PRESSURE: 136 MMHG | DIASTOLIC BLOOD PRESSURE: 80 MMHG | HEART RATE: 89 BPM | OXYGEN SATURATION: 97 % | HEIGHT: 61 IN | TEMPERATURE: 97.8 F

## 2024-04-18 VITALS
TEMPERATURE: 96.6 F | DIASTOLIC BLOOD PRESSURE: 62 MMHG | RESPIRATION RATE: 16 BRPM | HEART RATE: 80 BPM | SYSTOLIC BLOOD PRESSURE: 142 MMHG

## 2024-04-18 DIAGNOSIS — F41.9 ANXIETY AND DEPRESSION: ICD-10-CM

## 2024-04-18 DIAGNOSIS — D64.9 ANEMIA: ICD-10-CM

## 2024-04-18 DIAGNOSIS — E11.621 DIABETIC FOOT ULCER WITH OSTEOMYELITIS (HCC): ICD-10-CM

## 2024-04-18 DIAGNOSIS — C65.9 MALIGNANT NEOPLASM OF RENAL PELVIS, UNSPECIFIED LATERALITY (HCC): ICD-10-CM

## 2024-04-18 DIAGNOSIS — E11.42 TYPE 2 DIABETES MELLITUS WITH DIABETIC POLYNEUROPATHY, WITHOUT LONG-TERM CURRENT USE OF INSULIN (HCC): Chronic | ICD-10-CM

## 2024-04-18 DIAGNOSIS — K21.9 GASTROESOPHAGEAL REFLUX DISEASE WITHOUT ESOPHAGITIS: Primary | ICD-10-CM

## 2024-04-18 DIAGNOSIS — M81.0 AGE-RELATED OSTEOPOROSIS WITHOUT CURRENT PATHOLOGICAL FRACTURE: ICD-10-CM

## 2024-04-18 DIAGNOSIS — S91.001D OPEN WOUND OF RIGHT ANKLE, SUBSEQUENT ENCOUNTER: Primary | ICD-10-CM

## 2024-04-18 DIAGNOSIS — J44.9 COPD (CHRONIC OBSTRUCTIVE PULMONARY DISEASE) (HCC): ICD-10-CM

## 2024-04-18 DIAGNOSIS — K21.9 GERD WITHOUT ESOPHAGITIS: Chronic | ICD-10-CM

## 2024-04-18 DIAGNOSIS — I10 ESSENTIAL HYPERTENSION: Chronic | ICD-10-CM

## 2024-04-18 DIAGNOSIS — Z00.00 MEDICARE ANNUAL WELLNESS VISIT, SUBSEQUENT: ICD-10-CM

## 2024-04-18 DIAGNOSIS — L97.509 DIABETIC FOOT ULCER WITH OSTEOMYELITIS (HCC): ICD-10-CM

## 2024-04-18 DIAGNOSIS — E11.69 DIABETIC FOOT ULCER WITH OSTEOMYELITIS (HCC): ICD-10-CM

## 2024-04-18 DIAGNOSIS — K59.01 SLOW TRANSIT CONSTIPATION: ICD-10-CM

## 2024-04-18 DIAGNOSIS — J96.01 ACUTE RESPIRATORY FAILURE WITH HYPOXIA (HCC): ICD-10-CM

## 2024-04-18 DIAGNOSIS — I73.9 PAD (PERIPHERAL ARTERY DISEASE) (HCC): ICD-10-CM

## 2024-04-18 DIAGNOSIS — J44.9 COPD, SEVERITY TO BE DETERMINED (HCC): Chronic | ICD-10-CM

## 2024-04-18 DIAGNOSIS — R09.02 HYPOXIA: ICD-10-CM

## 2024-04-18 DIAGNOSIS — F32.A ANXIETY AND DEPRESSION: ICD-10-CM

## 2024-04-18 DIAGNOSIS — M86.9 DIABETIC FOOT ULCER WITH OSTEOMYELITIS (HCC): ICD-10-CM

## 2024-04-18 PROCEDURE — 11042 DBRDMT SUBQ TIS 1ST 20SQCM/<: CPT | Performed by: STUDENT IN AN ORGANIZED HEALTH CARE EDUCATION/TRAINING PROGRAM

## 2024-04-18 PROCEDURE — 99215 OFFICE O/P EST HI 40 MIN: CPT | Performed by: FAMILY MEDICINE

## 2024-04-18 PROCEDURE — G0439 PPPS, SUBSEQ VISIT: HCPCS | Performed by: FAMILY MEDICINE

## 2024-04-18 RX ORDER — BLOOD-GLUCOSE METER
EACH MISCELLANEOUS 2 TIMES DAILY
Qty: 1 KIT | Refills: 0 | Status: SHIPPED | OUTPATIENT
Start: 2024-04-18

## 2024-04-18 RX ORDER — CLONAZEPAM 1 MG/1
1 TABLET ORAL EVERY EVENING
Qty: 30 TABLET | Refills: 0 | Status: SHIPPED | OUTPATIENT
Start: 2024-04-18

## 2024-04-18 RX ORDER — FLUTICASONE PROPIONATE AND SALMETEROL 250; 50 UG/1; UG/1
1 POWDER RESPIRATORY (INHALATION) 2 TIMES DAILY
Qty: 60 BLISTER | Refills: 2 | Status: SHIPPED | OUTPATIENT
Start: 2024-04-18 | End: 2024-07-17

## 2024-04-18 RX ORDER — BUPROPION HYDROCHLORIDE 300 MG/1
300 TABLET ORAL DAILY
Qty: 90 TABLET | Refills: 0 | Status: SHIPPED | OUTPATIENT
Start: 2024-04-18

## 2024-04-18 RX ORDER — LISINOPRIL 5 MG/1
5 TABLET ORAL DAILY
Qty: 90 TABLET | Refills: 0 | Status: SHIPPED | OUTPATIENT
Start: 2024-04-18

## 2024-04-18 RX ORDER — ATORVASTATIN CALCIUM 10 MG/1
10 TABLET, FILM COATED ORAL
Qty: 90 TABLET | Refills: 0 | Status: SHIPPED | OUTPATIENT
Start: 2024-04-18

## 2024-04-18 RX ORDER — PREGABALIN 200 MG/1
CAPSULE ORAL
Qty: 90 CAPSULE | Refills: 0 | Status: SHIPPED | OUTPATIENT
Start: 2024-04-18

## 2024-04-18 RX ORDER — PANTOPRAZOLE SODIUM 40 MG/1
40 TABLET, DELAYED RELEASE ORAL DAILY
Qty: 90 TABLET | Refills: 0 | Status: SHIPPED | OUTPATIENT
Start: 2024-04-18

## 2024-04-18 RX ORDER — LANCETS 33 GAUGE
EACH MISCELLANEOUS
Qty: 100 EACH | Refills: 5 | Status: SHIPPED | OUTPATIENT
Start: 2024-04-18

## 2024-04-18 RX ORDER — ALBUTEROL SULFATE 90 UG/1
2 AEROSOL, METERED RESPIRATORY (INHALATION) EVERY 6 HOURS PRN
Qty: 18 G | Refills: 3 | Status: SHIPPED | OUTPATIENT
Start: 2024-04-18

## 2024-04-18 RX ORDER — ACETAMINOPHEN AND CODEINE PHOSPHATE 300; 30 MG/1; MG/1
1 TABLET ORAL EVERY 4 HOURS PRN
Qty: 30 TABLET | Refills: 0 | Status: SHIPPED | OUTPATIENT
Start: 2024-04-18

## 2024-04-18 RX ORDER — NALOXONE HYDROCHLORIDE 4 MG/.1ML
SPRAY NASAL
Qty: 1 EACH | Refills: 1 | Status: SHIPPED | OUTPATIENT
Start: 2024-04-18 | End: 2025-04-18

## 2024-04-18 NOTE — PATIENT INSTRUCTIONS
Orders Placed This Encounter   Procedures    Wound cleansing and dressings Diabetic Ulcer Posterior;Right Ankle     Right posterior ankle wound      Wash your hands with soap and water.  Remove old dressing, discard into plastic bag and place in trash.  Cleanse the wound with unscented mild soap (unscented dove) and water prior to applying a clean dressing. Do not use tissue or cotton balls. Do not scrub the wound. Pat dry using gauze.   Shower no      Apply Adaptic to the ankle wound.  Then apply Silver Alginate to the wound.  Cover with ABD Pad   Secure with mitch and tape.  Apply bulky dressing. Pad the bony prominences and lateral foot with ABD pad, 2 rolls cast padding, kerlex, coban. Do not wrap coban all the way up the leg.    Apply Spandagrip F to right lower leg     Change dressing EVERY OTHER DAY with [facility]       Elastic Tubular Stocking Spandagrip F - RLE   Tubular elastic bandage: Apply from base of toes to behind the knee. Apply in AM, may remove for sleep.   Avoid prolonged standing in one place.   Elevate leg(s) above the level of the heart when sitting or as much as possible.           Off-loading Instructions:      Keep weight and pressure off wound at all times.   Wear off-loading device as directed by your physician.   Put on immediately when rising in the morning and remove when going to bed.   Other:  float ankle to keep pressure off wound by elevating lower leg on pillows.     Standing Status:   Future     Standing Expiration Date:   4/25/2024    Wound cleansing and dressings Donor Site Medial;Right Leg     Right posterior ankle wound      Wash your hands with soap and water.  Remove old dressing, discard into plastic bag and place in trash.  Cleanse the wound with unscented mild soap (unscented dove) and water prior to applying a clean dressing. Do not use tissue or cotton balls. Do not scrub the wound. Pat dry using gauze.   Shower no      Apply Adaptic to the right lower leg donor  site.   Cover with ABD Pad   Secure with mitch and tape.      Apply Spandagrip F to right lower leg     Change dressing EVERY OTHER DAY with [facility]         Elastic Tubular Stocking Spandagrip F - RLE   Tubular elastic bandage: Apply from base of toes to behind the knee. Apply in AM, may remove for sleep.   Avoid prolonged standing in one place.   Elevate leg(s) above the level of the heart when sitting or as much as possible.           Off-loading Instructions:      Keep weight and pressure off wound at all times.   Wear off-loading device as directed by your physician.   Put on immediately when rising in the morning and remove when going to bed.   Other:  float ankle to keep pressure off wound by elevating lower leg on pillows.     Standing Status:   Future     Standing Expiration Date:   4/25/2024    Wound Procedure Treatment     This order was created via procedure documentation    Wound Procedure Treatment     This order was created via procedure documentation

## 2024-04-18 NOTE — PROGRESS NOTES
Assessment and Plan:     Problem List Items Addressed This Visit          Cardiovascular and Mediastinum    Essential hypertension (Chronic)     Hypertension stable control no change in medication regimen avoid sodium follow-up with me as scheduled at next visit         Relevant Medications    lisinopril (ZESTRIL) 5 mg tablet    PAD (peripheral artery disease) (HCC) (Chronic)    Relevant Medications    rivaroxaban (Xarelto) 2.5 mg tablet       Respiratory    COPD, severity to be determined (HCC) (Chronic)     Longstanding COPD has improved overall patient does not smoke now doing better overall with her Advair continue same dose         Relevant Medications    albuterol (Ventolin HFA) 90 mcg/act inhaler    Fluticasone-Salmeterol (Advair Diskus) 250-50 mcg/dose inhaler    Acute respiratory failure with hypoxia (HCC)     Patient is stable currently off oxygen            Digestive    GERD without esophagitis (Chronic)     GERD symptoms are stable continuing on proton pump inhibitor pantoprazole without change         Relevant Medications    pantoprazole (PROTONIX) 40 mg tablet       Endocrine    Type 2 diabetes mellitus with diabetic polyneuropathy (HCC) (Chronic)     Last A1c at 6 patient is doing well with her blood sugars we will reevaluate this at her next visit  Lab Results   Component Value Date    HGBA1C 6.0 (H) 02/28/2024            Relevant Medications    metFORMIN (GLUCOPHAGE) 1000 MG tablet    Lancets (OneTouch Delica Plus [id number]) MISC    Blood Glucose Monitoring Suppl (OneTouch Verio) w/Device KIT    pregabalin (LYRICA) 200 MG capsule    Diabetic foot ulcer with osteomyelitis (HCC)     Posthospitalization with IV antibiotics extensively and surgery.  Patient is afebrile and more stable at this point doing better came into the office for her follow-up evaluation and Medicare wellness visit will continue to monitor blood sugars closely most recent A1c is at 6 and she is doing well under good control  Lab  Results   Component Value Date    HGBA1C 6.0 (H) 02/28/2024            Relevant Medications    metFORMIN (GLUCOPHAGE) 1000 MG tablet    acetaminophen-codeine (TYLENOL with CODEINE #3) 300-30 MG per tablet    naloxone (NARCAN) 4 mg/0.1 mL nasal spray       Musculoskeletal and Integument    Age-related osteoporosis without current pathological fracture     Continue with calcium vitamin D check DEXA scan every 2 years         Relevant Medications    Apoaequorin 20 MG CAPS       Genitourinary    Malignant neoplasm of renal pelvis, unspecified laterality (HCC)     Stable currently condition resolved            Behavioral Health    Anxiety and depression (Chronic)    Relevant Medications    buPROPion (WELLBUTRIN XL) 300 mg 24 hr tablet    clonazePAM (KlonoPIN) 1 mg tablet       Blood    Anemia    Relevant Medications    cyanocobalamin (VITAMIN B-12) 1000 MCG tablet       Other    Medicare annual wellness visit, subsequent    Constipation     Other Visit Diagnoses       Gastroesophageal reflux disease without esophagitis    -  Primary    Relevant Medications    pantoprazole (PROTONIX) 40 mg tablet    COPD (chronic obstructive pulmonary disease) (HCC)        Relevant Medications    albuterol (Ventolin HFA) 90 mcg/act inhaler    Fluticasone-Salmeterol (Advair Diskus) 250-50 mcg/dose inhaler    Hypoxia        Relevant Medications    atorvastatin (LIPITOR) 10 mg tablet             Preventive health issues were discussed with patient, and age appropriate screening tests were ordered as noted in patient's After Visit Summary.  Personalized health advice and appropriate referrals for health education or preventive services given if needed, as noted in patient's After Visit Summary.     History of Present Illness:     Patient presents for a Medicare Wellness Visit    Patient is here for Medicare well visit and follow-up from hospitalization for infected right ankle after surgery and rehospitalization       Patient Care  Team:  Vivek Preston DO as PCP - General (Family Medicine)  Vivek Preston DO as PCP - PCP-[facility] ([facility])  Vivek Preston DO as PCP - PCP-[facility] ([facility])  Kassandra Hopkins PA-C as Physician Assistant (Physician Assistant)  ROXANE De Leon as Nurse Practitioner (Pulmonology)  Jennifer Gann DPM as Wound Care (Podiatry)     Review of Systems:     Review of Systems   Constitutional:  Negative for chills, fatigue and fever.   HENT:  Negative for congestion, nosebleeds, rhinorrhea, sinus pressure and sore throat.    Eyes:  Negative for discharge and redness.   Respiratory:  Negative for cough and shortness of breath.    Cardiovascular:  Negative for chest pain, palpitations and leg swelling.   Gastrointestinal:  Negative for abdominal pain, blood in stool and nausea.   Endocrine: Negative for cold intolerance, heat intolerance and polyuria.   Genitourinary:  Negative for dysuria and frequency.   Musculoskeletal:  Negative for arthralgias, back pain and myalgias.   Skin:  Negative for rash.   Neurological:  Negative for dizziness, weakness and headaches.   Hematological:  Negative for adenopathy.   Psychiatric/Behavioral:  Negative for behavioral problems and sleep disturbance. The patient is not nervous/anxious.         Problem List:     Patient Active Problem List   Diagnosis    Type 2 diabetes mellitus with diabetic polyneuropathy (HCC)    Essential hypertension    Anxiety and depression    Medicare annual wellness visit, subsequent    GERD without esophagitis    Urinary incontinence    Degenerative lumbar disc    Lactic acidosis    Hypomagnesemia    Bladder neoplasm    Left renal mass    PAD (peripheral artery disease) (HCC)    Abnormal CT of the chest    COPD, severity to be determined ([facility])    Tobacco use disorder    Age-related osteoporosis without current pathological fracture    Ankle ulcer, right, with fat layer exposed ([facility])    Diabetic foot ulcer with osteomyelitis ([facility])     Preoperative cardiovascular examination    Constipation    Hematoma    Anemia    At risk for venous thromboembolism (VTE)    At high risk for skin breakdown    Wound of skin    Impaired mobility and activities of daily living    Leukocytosis    Surgical site infection    Sepsis without acute organ dysfunction (HCC)    Other dietary vitamin B12 deficiency anemia    Sacral wound    Acute respiratory failure with hypoxia (HCC)    Malignant neoplasm of renal pelvis, unspecified laterality (HCC)      Past Medical and Surgical History:     Past Medical History:   Diagnosis Date    Anxiety     Closed fracture of coccyx (HCC) 2023    Diabetes mellitus (HCC)     GERD (gastroesophageal reflux disease)     Hyperlipidemia     Hypertension     IBS (irritable bowel syndrome)     Shoulder fracture      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;  Surgeon: Cristian Child MD;  Location: [facility];  Service: Urology    EVACUATION OF HEMATOMA Right 2024    Procedure: EVACUATION/ DRAINAGE CHEST WALL  HEMATOMA;  Surgeon: Seth Hoyos MD;  Location: [facility];  Service: Vascular    FL RETROGRADE PYELOGRAM  2023    HERNIA REPAIR      umbilicus w/ mesh    WA BYPASS W/VEIN AXILLARY-FEMORAL Right 2024    Procedure: BYPASS AXILLO-FEMORAL, RIGHT WITH 8MM RINGED PTFE GRAFT;  Surgeon: Seth Hoyos MD;  Location: [facility];  Service: Vascular    SPLIT THICKNESS SKIN GRAFT Right 2024    Procedure: SKIN GRAFT SPLIT  THICKNESS (STSG)  EXTREMITY, Wound vac dressing change;  Surgeon: Rigo Yeboah DPM;  Location: [facility];  Service: Podiatry    WOUND DEBRIDEMENT Right 2/4/2024    Procedure: RIGHT WOUND AND ACHILLES DEBRIDEMENT FOOT/TOE (WASH OUT); PARTIAL AMPUTATION DISTAL 2ND TOE;  Surgeon: Aaron Montero DPM;  [facility];  Service: Podiatry    WOUND DEBRIDEMENT Right 2/14/2024    Procedure: DEBRIDEMENT RIGHT GROIN  WOUND AND WASHOUT, APPLICATION OF WOUND VAC;  Surgeon: Suly Muro DO;  [facility];  Service: Vascular    WOUND DEBRIDEMENT Right 2/19/2024    Procedure: DEBRIDEMENT LOWER EXTREMITY, washout;  Surgeon: Suly Muro DO;  [facility];  Service: Vascular      Family History:     Family History   Problem Relation Age of Onset    COPD Mother     Emphysema Mother     COPD Father     Emphysema Father       Social History:     Social History     Socioeconomic History    Marital status: /Civil Union     Spouse name: None    Number of children: None    Years of education: None    Highest education level: None   Occupational History    None   Tobacco Use    Smoking status: Former     Current packs/day: 1.00     Average packs/day: 1 pack/day for 63.3 years (63.3 ttl pk-yrs)     Types: Cigarettes     Start date: 1961    Smokeless tobacco: Never    Tobacco comments:     Quit January 31, 2024   Vaping Use    Vaping status: Never Used   Substance and Sexual Activity    Alcohol use: Not Currently    Drug use: Never    Sexual activity: Not Currently   Other Topics Concern    None   Social History Narrative    None     Social Determinants of Health     Financial Resource Strain: Low Risk  (2/9/2023)    Overall Financial Resource Strain (CARDIA)     Difficulty of Paying Living Expenses: Not hard at all   Food Insecurity: No Food Insecurity (4/18/2024)    Hunger Vital Sign     Worried About Running Out of Food in the Last Year: Never true     Ran Out of Food in the Last Year: Never true    Transportation Needs: No Transportation Needs (4/18/2024)    PRAPARE - Transportation     Lack of Transportation (Medical): No     Lack of Transportation (Non-Medical): No   Physical Activity: Not on file   Stress: Not on file   Social Connections: Not on file   Intimate Partner Violence: Not on file   Housing Stability: Low Risk  (4/18/2024)    Housing Stability Vital Sign     Unable to Pay for Housing in the Last Year: No     Number of Places Lived in the Last Year: 1     Unstable Housing in the Last Year: No      Medications and Allergies:     Current Outpatient Medications   Medication Sig Dispense Refill    acetaminophen-codeine (TYLENOL with CODEINE #3) 300-30 MG per tablet Take 1 tablet by mouth every 4 (four) hours as needed for moderate pain 30 tablet 0    albuterol (Ventolin HFA) 90 mcg/act inhaler Inhale 2 puffs every 6 (six) hours as needed for wheezing 18 g 3    Apoaequorin 20 MG CAPS Take 20 mg by mouth in the morning 90 capsule 0    atorvastatin (LIPITOR) 10 mg tablet Take 1 tablet (10 mg total) by mouth daily with dinner 90 tablet 0    Blood Glucose Monitoring Suppl (OneTouch Verio) w/Device KIT Use 2 (two) times a day 1 kit 0    buPROPion (WELLBUTRIN XL) 300 mg 24 hr tablet Take 1 tablet (300 mg total) by mouth daily 90 tablet 0    clonazePAM (KlonoPIN) 1 mg tablet Take 1 tablet (1 mg total) by mouth every evening 30 tablet 0    cyanocobalamin (VITAMIN B-12) 1000 MCG tablet Take 1 tablet (1,000 mcg total) by mouth daily 90 tablet 0    Fluticasone-Salmeterol (Advair Diskus) 250-50 mcg/dose inhaler Inhale 1 puff 2 (two) times a day Rinse mouth after use. 60 blister 2    Lancets (OneTouch Delica Plus [id number]) MISC TEST TWO TIMES A  each 5    lisinopril (ZESTRIL) 5 mg tablet Take 1 tablet (5 mg total) by mouth daily 90 tablet 0    metFORMIN (GLUCOPHAGE) 1000 MG tablet Take 1 tablet (1,000 mg total) by mouth 2 (two) times a day with meals 180 tablet 0    naloxone (NARCAN) 4 mg/0.1 mL nasal  spray Administer 1 spray into a nostril. If no response after 2-3 minutes, give another dose in the other nostril using a new spray. 1 each 1    pantoprazole (PROTONIX) 40 mg tablet Take 1 tablet (40 mg total) by mouth daily 90 tablet 0    pregabalin (LYRICA) 200 MG capsule TAKE ONE CAPSULE BY MOUTH THREE TIMES A DAY 90 capsule 0    rivaroxaban (Xarelto) 2.5 mg tablet Take 1 tablet (2.5 mg total) by mouth 2 (two) times a day 60 tablet 0    acetaminophen (TYLENOL) 325 mg tablet Take 3 tablets (975 mg total) by mouth every 8 (eight) hours      Aspirin (ASPIR-81 PO) take one by mouth daily      levofloxacin (LEVAQUIN) 500 mg tablet       Misc. Devices (Carex Coccyx Cushion) MISC Use in the morning 1 each 0    nicotine (NICODERM CQ) 14 mg/24hr TD 24 hr patch Place 1 patch on the skin over 24 hours daily 28 patch 0    Omega-3 Fatty Acids (FISH OIL PO) 1 cap oral daily      polyethylene glycol (MIRALAX) 17 g packet Take 17 g by mouth daily       No current facility-administered medications for this visit.     Allergies   Allergen Reactions    Paroxetine Other (See Comments)     Became suicidal    Adhesive [Medical Tape] Itching and Blisters    Carafate [Sucralfate] Other (See Comments)     Mouth sores, tongue sore    Sulfa Antibiotics Hives and Rash    Sulfamethoxazole-Trimethoprim Hives      Immunizations:     Immunization History   Administered Date(s) Administered    COVID-19 MODERNA VACC 0.5 ML IM 04/26/2021, 05/24/2021, 12/13/2021    COVID-19 Moderna Vac BIVALENT 12 Yr+ IM 0.5 ML 12/07/2022    Hep B / HiB 03/27/2013, 04/30/2013, 10/03/2013    INFLUENZA 03/24/2014, 10/18/2022    Influenza, high dose seasonal 0.7 mL 11/07/2019, 10/02/2020, 10/27/2021, 10/18/2022, 11/09/2023    Pneumococcal Conjugate 13-Valent 01/17/2020    Pneumococcal Conjugate Vaccine 20-valent (Pcv20), Polysace 11/28/2023, 11/28/2023    Tdap 01/29/2017      Health Maintenance:         Topic Date Due    Breast Cancer Screening: Mammogram  10/19/2024     Colorectal Cancer Screening  09/15/2026    Hepatitis C Screening  Completed    Lung Cancer Screening  Discontinued         Topic Date Due    Hepatitis A Vaccine (1 of 2 - Risk 2-dose series) Never done    COVID-19 Vaccine (5 - 2023-24 season) 09/01/2023      Medicare Screening Tests and Risk Assessments:     Lona Hastings is here for her Subsequent Wellness visit.     Health Risk Assessment:   Patient rates overall health as fair. Patient feels that their physical health rating is slightly worse. Patient is satisfied with their life. Eyesight was rated as same. Hearing was rated as same. Patient feels that their emotional and mental health rating is same. Patients states they are sometimes angry. Patient states they are sometimes unusually tired/fatigued. Pain experienced in the last 7 days has been some. Patient's pain rating has been 5/10. Patient states that she has experienced no weight loss or gain in last 6 months.     Depression Screening:   PHQ-9 Score: 4      Fall Risk Screening:   In the past year, patient has experienced: history of falling in past year    Number of falls: 2 or more  Injured during fall?: Yes    Feels unsteady when standing or walking?: Yes    Worried about falling?: Yes      Urinary Incontinence Screening:   Patient has leaked urine accidently in the last six months.     Home Safety:  Patient does not have trouble with stairs inside or outside of their home. Patient has working smoke alarms and has working carbon monoxide detector. Home safety hazards include: none.     Nutrition:   Current diet is Diabetic.     Medications:   Patient is currently taking over-the-counter supplements. OTC medications include: see medication list. Patient is able to manage medications.     Activities of Daily Living (ADLs)/Instrumental Activities of Daily Living (IADLs):   Walk and transfer into and out of bed and chair?: No  Dress and groom yourself?: Yes    Bathe or shower yourself?: Yes    Feed yourself?  "Yes  Do your laundry/housekeeping?: No  Manage your money, pay your bills and track your expenses?: No  Make your own meals?: No    Do your own shopping?: No    Previous Hospitalizations:   Any hospitalizations or ED visits within the last 12 months?: Yes    How many hospitalizations have you had in the last year?: 1-2    PREVENTIVE SCREENINGS      Cardiovascular Screening:    General: Screening Current      Diabetes Screening:     General: Screening Not Indicated and History Diabetes      Colorectal Cancer Screening:     General: Screening Current      Breast Cancer Screening:     General: Screening Current      Cervical Cancer Screening:    General: Screening Not Indicated      Osteoporosis Screening:    General: Screening Not Indicated and History Osteoporosis      Lung Cancer Screening:     General: Screening Current      Hepatitis C Screening:    General: Screening Current    Screening, Brief Intervention, and Referral to Treatment (SBIRT)    Screening  Typical number of drinks in a day: 0  Typical number of drinks in a week: 2  Interpretation: Low risk drinking behavior.    Single Item Drug Screening:  How often have you used an illegal drug (including marijuana) or a prescription medication for non-medical reasons in the past year? never    Single Item Drug Screen Score: 0  Interpretation: Negative screen for possible drug use disorder    No results found.     Physical Exam:     /80   Pulse 89   Temp 97.8 °F (36.6 °C) (Temporal)   Resp 18   Ht 5' 1\" (1.549 m)   SpO2 97%   BMI 29.66 kg/m²     Physical Exam  Vitals and nursing note reviewed.   Constitutional:       General: She is not in acute distress.     Appearance: She is well-developed.   HENT:      Head: Normocephalic and atraumatic.      Nose: Nose normal.      Mouth/Throat:      Mouth: Mucous membranes are moist.   Eyes:      Conjunctiva/sclera: Conjunctivae normal.   Cardiovascular:      Rate and Rhythm: Normal rate and regular rhythm.      " Heart sounds: No murmur heard.  Pulmonary:      Effort: Pulmonary effort is normal. No respiratory distress.      Breath sounds: Normal breath sounds.   Abdominal:      Palpations: Abdomen is soft.      Tenderness: There is no abdominal tenderness.   Musculoskeletal:         General: No swelling.      Cervical back: Normal range of motion and neck supple.   Skin:     General: Skin is warm and dry.      Capillary Refill: Capillary refill takes less than 2 seconds.      Findings: Erythema and lesion present.   Neurological:      General: No focal deficit present.      Mental Status: She is alert.   Psychiatric:         Mood and Affect: Mood normal.      I have spent a total time of 50 minutes on 04/18/24 in caring for this patient including Diagnostic results, Prognosis, Risks and benefits of tx options, Instructions for management, Patient and family education, Importance of tx compliance, Risk factor reductions, Impressions, Counseling / Coordination of care, Documenting in the medical record, Reviewing / ordering tests, medicine, procedures  , Obtaining or reviewing history  , and Communicating with other healthcare professionals .      Vivek Preston, DO

## 2024-04-18 NOTE — ASSESSMENT & PLAN NOTE
Last A1c at 6 patient is doing well with her blood sugars we will reevaluate this at her next visit  Lab Results   Component Value Date    HGBA1C 6.0 (H) 02/28/2024

## 2024-04-18 NOTE — ASSESSMENT & PLAN NOTE
Longstanding COPD has improved overall patient does not smoke now doing better overall with her Advair continue same dose

## 2024-04-18 NOTE — ASSESSMENT & PLAN NOTE
Hypertension stable control no change in medication regimen avoid sodium follow-up with me as scheduled at next visit

## 2024-04-18 NOTE — PROGRESS NOTES
Wound Procedure Treatment Diabetic Ulcer Posterior;Right Ankle    Performed by: Quiana Cardoza RN  Authorized by: Jennifer Gann DPM  Associated wounds:   Wound 02/28/24 Diabetic Ulcer Ankle Posterior;Right  Wound cleansed with:  Wound aggrssively cleansed with NSS and gauze  Applied primary dressing:  Polymem foam and Silver  Applied secondary dressing:  ABD and Cast padding  Wound secured with:  Coban and Tape  Offloading device appllied:  Heel offloading boot  Wound Procedure Treatment Donor Site Medial;Right Leg    Performed by: Quiana Cardoza RN  Authorized by: Jennifer Gann DPM  Associated wounds:   Wound 03/15/24 Donor Site Leg Medial;Right  Wound cleansed with:  NSS and Wound aggrssively cleansed with NSS and gauze  Applied primary dressing:  Non adherent contact layer  Applied secondary dressing:  Gauze  Wound secured with:  Kerlix and Tape

## 2024-04-18 NOTE — ASSESSMENT & PLAN NOTE
Posthospitalization with IV antibiotics extensively and surgery.  Patient is afebrile and more stable at this point doing better came into the office for her follow-up evaluation and Medicare wellness visit will continue to monitor blood sugars closely most recent A1c is at 6 and she is doing well under good control  Lab Results   Component Value Date    HGBA1C 6.0 (H) 02/28/2024

## 2024-04-18 NOTE — PROGRESS NOTES
Patient ID: Lona Cedeno is a 78 y.o. female Date of Birth 1945     Diagnosis:  1. Open wound of right ankle, subsequent encounter  -     Wound cleansing and dressings Diabetic Ulcer Posterior;Right Ankle; Future  -     Wound Procedure Treatment Diabetic Ulcer Posterior;Right Ankle    2. PAD (peripheral artery disease) ([facility])  -     Wound cleansing and dressings Diabetic Ulcer Posterior;Right Ankle; Future  -     Wound cleansing and dressings Donor Site Medial;Right Leg; Future  -     Wound Procedure Treatment Donor Site Medial;Right Leg       Diagnosis ICD-10-CM Associated Orders   1. Open wound of right ankle, subsequent encounter  S91.001D Wound cleansing and dressings Diabetic Ulcer Posterior;Right Ankle     Wound Procedure Treatment Diabetic Ulcer Posterior;Right Ankle      2. PAD (peripheral artery disease) ([facility])  I73.9 Wound cleansing and dressings Diabetic Ulcer Posterior;Right Ankle     Wound cleansing and dressings Donor Site Medial;Right Leg     Wound Procedure Treatment Donor Site Medial;Right Leg           -I recommend close monitoring with local wound care as detailed below  -Patient will benefit from skin graft substitute, insurance approval send recommending new ABIs postintervention. Order for arterial duplex in.   -Discussed importance of strict offloading to the wound area  -Discussed importance of glycemic control proper protein intake  -Patient was educated on clinical signs of wound infection if present she is to call my office immediately  -Return in 1 week for follow-up       No chief complaint on file.          Subjective:   78-year-old female with past medical history as detailed below presents with daughter for continued evaluation of right posterior lower leg ulcer complicated by peripheral arterial disease pressure and diabetes.  Patient reports her VNA was doing dressing changes and accidentally scraped the dry skin off of her donor site on the right leg which created a small wound  now.  No other complaints.        The following portions of the patient's history were reviewed and updated as appropriate:   Patient Active Problem List   Diagnosis    Type 2 diabetes mellitus with diabetic polyneuropathy (HCC)    Essential hypertension    Anxiety and depression    GERD without esophagitis    Urinary incontinence    Degenerative lumbar disc    Lactic acidosis    Hypomagnesemia    Bladder neoplasm    Left renal mass    PAD (peripheral artery disease) (HCC)    Abnormal CT of the chest    COPD, severity to be determined (HCC)    Tobacco use disorder    Age-related osteoporosis without current pathological fracture    Ankle ulcer, right, with fat layer exposed (HCC)    Diabetic foot ulcer with osteomyelitis ([facility])    Preoperative cardiovascular examination    Constipation    Hematoma    Anemia    At risk for venous thromboembolism (VTE)    At high risk for skin breakdown    Wound of skin    Impaired mobility and activities of daily living    Leukocytosis    Surgical site infection    Sepsis without acute organ dysfunction ([facility])    Other dietary vitamin B12 deficiency anemia    Sacral wound     Past Medical History:   Diagnosis Date    Anxiety     Closed fracture of coccyx ([facility]) 2023    Diabetes mellitus (HCC)     GERD (gastroesophageal reflux disease)     Hyperlipidemia     Hypertension     IBS (irritable bowel syndrome)     Shoulder fracture      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;  Surgeon: Cristian Child MD;   Location: [facility];  Service: Urology    EVACUATION OF HEMATOMA Right 2/4/2024    Procedure: EVACUATION/ DRAINAGE CHEST WALL  HEMATOMA;  Surgeon: Seth Hoyos MD;  Location: [facility];  Service: Vascular    FL RETROGRADE PYELOGRAM  9/18/2023    HERNIA REPAIR      umbilicus w/ mesh    CT BYPASS W/VEIN AXILLARY-FEMORAL Right 1/31/2024    Procedure: BYPASS AXILLO-FEMORAL, RIGHT WITH 8MM RINGED PTFE GRAFT;  Surgeon: Seth Hoyos MD;  [facility]: [facility];  Service: Vascular    SPLIT THICKNESS SKIN GRAFT Right 2/28/2024    Procedure: SKIN GRAFT SPLIT THICKNESS ([facility])  EXTREMITY, Wound vac dressing change;  Surgeon: Rigo Yeboah DPM;  [facility];  Service: Podiatry    WOUND DEBRIDEMENT Right 2/4/2024    Procedure: RIGHT WOUND AND ACHILLES DEBRIDEMENT FOOT/TOE (WASH OUT); PARTIAL AMPUTATION DISTAL 2ND TOE;  Surgeon: Aaron Montero DPM;  [facility];  Service: Podiatry    WOUND DEBRIDEMENT Right 2/14/2024    Procedure: DEBRIDEMENT RIGHT GROIN  WOUND AND WASHOUT, APPLICATION OF WOUND VAC;  Surgeon: Suly Muro DO;  [facility];  Service: Vascular    WOUND DEBRIDEMENT Right 2/19/2024    Procedure: DEBRIDEMENT LOWER EXTREMITY, washout;  Surgeon: Suly Muro DO;  [facility];  Service: Vascular     Social History     Socioeconomic History    Marital status: /Civil Union     Spouse name: Not on file    Number of children: Not on file    Years of education: Not on file    Highest education level: Not on file   Occupational History    Not on file   Tobacco Use    Smoking status: Former     Current packs/day: 1.00     Average packs/day: 1 pack/day for 63.3 years (63.3 ttl pk-yrs)     Types: Cigarettes     Start date: 1961    Smokeless tobacco: Never    Tobacco comments:     Quit January 31, 2024   Vaping Use    Vaping status: Never Used   Substance and Sexual Activity    Alcohol use: Not Currently    Drug use: Never    Sexual activity: Not  Currently   Other Topics Concern    Not on file   Social History Narrative    Not on file     Social Determinants of Health     Financial Resource Strain: Low Risk  (2/9/2023)    Overall Financial Resource Strain (CARDIA)     Difficulty of Paying Living Expenses: Not hard at all   Food Insecurity: No Food Insecurity (3/14/2024)    Hunger Vital Sign     Worried About Running Out of Food in the Last Year: Never true     Ran Out of Food in the Last Year: Never true   Transportation Needs: No Transportation Needs (3/5/2024)    PRAPARE - Transportation     Lack of Transportation (Medical): No     Lack of Transportation (Non-Medical): No   Physical Activity: Not on file   Stress: Not on file   Social Connections: Not on file   Intimate Partner Violence: Not on file   Housing Stability: Low Risk  (3/14/2024)    Housing Stability Vital Sign     Unable to Pay for Housing in the Last Year: No     Number of Places Lived in the Last Year: 1     Unstable Housing in the Last Year: No        Current Outpatient Medications:     acetaminophen (TYLENOL) 325 mg tablet, Take 3 tablets (975 mg total) by mouth every 8 (eight) hours, Disp: , Rfl:     albuterol (Ventolin HFA) 90 mcg/act inhaler, Inhale 2 puffs every 6 (six) hours as needed for wheezing, Disp: 18 g, Rfl: 0    Apoaequorin 20 MG CAPS, Take 20 mg by mouth in the morning, Disp: , Rfl:     Aspirin (ASPIR-81 PO), take one by mouth daily, Disp: , Rfl:     atorvastatin (LIPITOR) 10 mg tablet, Take 1 tablet (10 mg total) by mouth daily with dinner, Disp: 90 tablet, Rfl: 1    Blood Glucose Monitoring Suppl (ONETOUCH VERIO) w/Device KIT, by Does not apply route 2 (two) times a day, Disp: 1 kit, Rfl: 0    buPROPion (WELLBUTRIN XL) 300 mg 24 hr tablet, Take 1 tablet (300 mg total) by mouth daily, Disp: 90 tablet, Rfl: 0    clonazePAM (KlonoPIN) 1 mg tablet, Take 1 tablet (1 mg total) by mouth every evening, Disp: 30 tablet, Rfl: 3    cyanocobalamin (VITAMIN B-12) 1000 MCG tablet, Take 1  tablet (1,000 mcg total) by mouth daily, Disp: , Rfl:     Fluticasone-Salmeterol (Advair Diskus) 250-50 mcg/dose inhaler, Inhale 1 puff 2 (two) times a day Rinse mouth after use., Disp: 60 blister, Rfl: 0    Lancets (OneTouch Delica Plus Ebobcm69A) MISC, TEST TWO TIMES A DAY, Disp: 100 each, Rfl: 5    levofloxacin (LEVAQUIN) 500 mg tablet, , Disp: , Rfl:     lisinopril (ZESTRIL) 5 mg tablet, Take 1 tablet (5 mg total) by mouth daily Do not start before March 14, 2024., Disp: , Rfl:     metFORMIN (GLUCOPHAGE) 1000 MG tablet, Take 1 tablet (1,000 mg total) by mouth 2 (two) times a day with meals, Disp: 30 tablet, Rfl: 0    Misc. Devices (Carex Coccyx Cushion) MISC, Use in the morning, Disp: 1 each, Rfl: 0    nicotine (NICODERM CQ) 14 mg/24hr TD 24 hr patch, Place 1 patch on the skin over 24 hours daily, Disp: 28 patch, Rfl: 0    Omega-3 Fatty Acids (FISH OIL PO), 1 cap oral daily, Disp: , Rfl:     pantoprazole (PROTONIX) 40 mg tablet, Take 40 mg by mouth daily, Disp: , Rfl:     polyethylene glycol (MIRALAX) 17 g packet, Take 17 g by mouth daily, Disp: , Rfl:     pregabalin (LYRICA) 200 MG capsule, TAKE ONE CAPSULE BY MOUTH THREE TIMES A DAY, Disp: 90 capsule, Rfl: 2    rivaroxaban (Xarelto) 2.5 mg tablet, Take 1 tablet (2.5 mg total) by mouth 2 (two) times a day, Disp: 60 tablet, Rfl: 0  Family History   Problem Relation Age of Onset    COPD Mother     Emphysema Mother     COPD Father     Emphysema Father       Review of Systems   All other systems reviewed and are negative.    Allergies:  Paroxetine, Adhesive [medical tape], Carafate [sucralfate], Sulfa antibiotics, and Sulfamethoxazole-trimethoprim      Objective:  /62   Pulse 80   Temp (!) 96.6 °F (35.9 °C)   Resp 16     Physical Exam  Vitals reviewed.   Feet:      Right foot:      Skin integrity: Ulcer present.      Comments: Right posterior ankle ulcer probable to subcutaneous tissue and Achilles tendon.  Hypergranulation tissue noted.  Postdebridement  wound was bleeding and 100% granular.    Superficial ulcer noted to the level of dermal tissue on the right leg where the donor site is.            Wound 02/28/24 Diabetic Ulcer Ankle Posterior;Right (Active)   Wound Image   04/04/24 0840   Wound Description Granulation tissue;Hypergranulation;Yellow;Slough;Pink 04/18/24 0946   Ani-wound Assessment Erythema;Maceration;Edema 04/18/24 0946   Wound Length (cm) 5.5 cm 04/18/24 0946   Wound Width (cm) 2.5 cm 04/18/24 0946   Wound Depth (cm) 0.1 cm 04/18/24 0946   Wound Surface Area (cm^2) 13.75 cm^2 04/18/24 0946   Wound Volume (cm^3) 1.375 cm^3 04/18/24 0946   Calculated Wound Volume (cm^3) 1.38 cm^3 04/18/24 0946   Change in Wound Size % 71.25 04/18/24 0946   Drainage Amount Large 04/18/24 0946   Drainage Description Bloody;Yellow;Serous;Brown 04/18/24 0946   Non-staged Wound Description Full thickness 04/18/24 0946   Treatments Cleansed 04/04/24 0817   Patient Tolerance Tolerated well 04/18/24 0946   Dressing Status Intact;Old drainage 04/18/24 0946       Wound 03/15/24 Donor Site Leg Medial;Right (Active)   Wound Description Bleeding;Hypergranulation;Granulation tissue 04/18/24 0946   Ani-wound Assessment Pink;Fragile 04/18/24 0946   Wound Length (cm) 3.7 cm 04/18/24 0946   Wound Width (cm) 1.5 cm 04/18/24 0946   Wound Depth (cm) 0.1 cm 04/18/24 0946   Wound Surface Area (cm^2) 5.55 cm^2 04/18/24 0946   Wound Volume (cm^3) 0.555 cm^3 04/18/24 0946   Calculated Wound Volume (cm^3) 0.56 cm^3 04/18/24 0946   Change in Wound Size % 76.67 04/18/24 0946   Drainage Amount Large 04/18/24 0946   Drainage Description Bloody 04/18/24 0946   Non-staged Wound Description Full thickness 04/18/24 0946   Treatments Irrigation with NSS 04/18/24 0946   Patient Tolerance Tolerated well 04/18/24 0946   Dressing Status Intact;Old drainage 04/18/24 0946                         Debridement   Wound 02/28/24 Diabetic Ulcer Ankle Posterior;Right    Universal Protocol:  Consent: Verbal  "consent obtained.  Risks and benefits: risks, benefits and alternatives were discussed  Consent given by: patient  Time out: Immediately prior to procedure a \"time out\" was called to verify the correct patient, procedure, equipment, support staff and site/side marked as required.  Patient understanding: patient states understanding of the procedure being performed  Patient identity confirmed: verbally with patient    Debridement Details  Performed by: physician  Debridement type: surgical  Level of debridement: subcutaneous tissue  Pain control: lidocaine 4%      Post-debridement measurements  Length (cm): 5.5  Width (cm): 2.5  Depth (cm): 0.3  Percent debrided: 100%  Surface Area (cm^2): 13.75  Area Debrided (cm^2): 13.75  Volume (cm^3): 4.13    Tissue and other material debrided: subcutaneous tissue  Devitalized tissue debrided: biofilm, exudate, fibrin and slough  Instrument(s) utilized: curette  Bleeding: small  Hemostasis obtained with: pressure  Procedural pain (0-10): insensate  Post-procedural pain: insensate   Response to treatment: procedure was tolerated well         Results from last 6 Months   Lab Units 02/14/24  1539   WOUND CULTURE  1+ Growth of Klebsiella pneumoniae*  1+ Growth of Proteus vulgaris group*  Few Colonies of         Wound Instructions:  Orders Placed This Encounter   Procedures    Wound cleansing and dressings Diabetic Ulcer Posterior;Right Ankle     Right posterior ankle wound      Wash your hands with soap and water.  Remove old dressing, discard into plastic bag and place in trash.  Cleanse the wound with unscented mild soap (unscented dove) and water prior to applying a clean dressing. Do not use tissue or cotton balls. Do not scrub the wound. Pat dry using gauze.   Shower no      Apply Adaptic to the ankle wound.  Then apply Silver Alginate to the wound.  Cover with ABD Pad   Secure with mitch and tape.  Apply bulky dressing. Pad the bony prominences and lateral foot with ABD pad, " 2 rolls cast padding, kerlex, coban. Do not wrap coban all the way up the leg.    Apply Spandagrip F to right lower leg     Change dressing EVERY OTHER DAY with [facility]       Elastic Tubular Stocking Spandagrip F - RLE   Tubular elastic bandage: Apply from base of toes to behind the knee. Apply in AM, may remove for sleep.   Avoid prolonged standing in one place.   Elevate leg(s) above the level of the heart when sitting or as much as possible.           Off-loading Instructions:      Keep weight and pressure off wound at all times.   Wear off-loading device as directed by your physician.   Put on immediately when rising in the morning and remove when going to bed.   Other:  float ankle to keep pressure off wound by elevating lower leg on pillows.     Standing Status:   Future     Standing Expiration Date:   4/25/2024    Wound cleansing and dressings Donor Site Medial;Right Leg     Right posterior ankle wound      Wash your hands with soap and water.  Remove old dressing, discard into plastic bag and place in trash.  Cleanse the wound with unscented mild soap (unscented dove) and water prior to applying a clean dressing. Do not use tissue or cotton balls. Do not scrub the wound. Pat dry using gauze.   Shower no      Apply Adaptic to the right lower leg donor site.   Cover with ABD Pad   Secure with mitch and tape.      Apply Spandagrip F to right lower leg     Change dressing EVERY OTHER DAY with [facility]         Elastic Tubular Stocking Spandagrip F - RLE   Tubular elastic bandage: Apply from base of toes to behind the knee. Apply in AM, may remove for sleep.   Avoid prolonged standing in one place.   Elevate leg(s) above the level of the heart when sitting or as much as possible.           Off-loading Instructions:      Keep weight and pressure off wound at all times.   Wear off-loading device as directed by your physician.   Put on immediately when rising in the morning and remove when going to bed.  "  Other:  float ankle to keep pressure off wound by elevating lower leg on pillows.     Standing Status:   Future     Standing Expiration Date:   4/25/2024    Wound Procedure Treatment Diabetic Ulcer Posterior;Right Ankle     This order was created via procedure documentation    Wound Procedure Treatment Donor Site Medial;Right Leg     This order was created via procedure documentation    Debridement     This order was created via procedure documentation         Jennifer Gann DPM      Portions of the record may have been created with voice recognition software. Occasional wrong word or \"sound a like\" substitutions may have occurred due to the inherent limitations of voice recognition software. Read the chart carefully and recognize, using context, where substitutions have occurred.      "

## 2024-04-19 ENCOUNTER — HOME CARE VISIT (OUTPATIENT)
Dept: HOME HEALTH SERVICES | Facility: HOME HEALTHCARE | Age: 79
End: 2024-04-19
Payer: COMMERCIAL

## 2024-04-20 ENCOUNTER — HOME CARE VISIT (OUTPATIENT)
Dept: HOME HEALTH SERVICES | Facility: HOME HEALTHCARE | Age: 79
End: 2024-04-20
Payer: COMMERCIAL

## 2024-04-20 VITALS
DIASTOLIC BLOOD PRESSURE: 60 MMHG | OXYGEN SATURATION: 95 % | HEART RATE: 90 BPM | RESPIRATION RATE: 20 BRPM | TEMPERATURE: 98.7 F | SYSTOLIC BLOOD PRESSURE: 128 MMHG

## 2024-04-20 PROCEDURE — G0299 HHS/HOSPICE OF RN EA 15 MIN: HCPCS

## 2024-04-20 NOTE — CASE COMMUNICATION
S[facility] to Pt. Home   Ordering MD Jennifer Gann (home health only)   Wound 1 rle Full x Frequency EOD  Wound 2 right groin Full x Frequency daily     Coban 4 in [id number]  Gauze Kerlix (fluff roll) 4inch sterile [id number]  Cast padding 12 rolls

## 2024-04-22 ENCOUNTER — HOME CARE VISIT (OUTPATIENT)
Dept: HOME HEALTH SERVICES | Facility: HOME HEALTHCARE | Age: 79
End: 2024-04-22
Payer: COMMERCIAL

## 2024-04-22 VITALS
OXYGEN SATURATION: 94 % | DIASTOLIC BLOOD PRESSURE: 64 MMHG | HEART RATE: 80 BPM | SYSTOLIC BLOOD PRESSURE: 130 MMHG | RESPIRATION RATE: 20 BRPM | TEMPERATURE: 98 F

## 2024-04-22 PROCEDURE — G0299 HHS/HOSPICE OF RN EA 15 MIN: HCPCS

## 2024-04-23 ENCOUNTER — HOME CARE VISIT (OUTPATIENT)
Dept: HOME HEALTH SERVICES | Facility: HOME HEALTHCARE | Age: 79
End: 2024-04-23
Payer: COMMERCIAL

## 2024-04-24 ENCOUNTER — HOME CARE VISIT (OUTPATIENT)
Dept: HOME HEALTH SERVICES | Facility: HOME HEALTHCARE | Age: 79
End: 2024-04-24
Payer: COMMERCIAL

## 2024-04-24 VITALS
HEART RATE: 78 BPM | RESPIRATION RATE: 18 BRPM | TEMPERATURE: 98.7 F | DIASTOLIC BLOOD PRESSURE: 78 MMHG | OXYGEN SATURATION: 93 % | SYSTOLIC BLOOD PRESSURE: 124 MMHG

## 2024-04-24 PROCEDURE — G0299 HHS/HOSPICE OF RN EA 15 MIN: HCPCS

## 2024-04-25 ENCOUNTER — OFFICE VISIT (OUTPATIENT)
Dept: WOUND CARE | Facility: CLINIC | Age: 79
End: 2024-04-25
Payer: COMMERCIAL

## 2024-04-25 ENCOUNTER — HOME CARE VISIT (OUTPATIENT)
Dept: HOME HEALTH SERVICES | Facility: HOME HEALTHCARE | Age: 79
End: 2024-04-25
Payer: COMMERCIAL

## 2024-04-25 VITALS
RESPIRATION RATE: 18 BRPM | SYSTOLIC BLOOD PRESSURE: 160 MMHG | TEMPERATURE: 96.1 F | HEART RATE: 70 BPM | DIASTOLIC BLOOD PRESSURE: 68 MMHG

## 2024-04-25 DIAGNOSIS — S81.801D OPEN WOUND OF RIGHT LOWER EXTREMITY, SUBSEQUENT ENCOUNTER: Primary | ICD-10-CM

## 2024-04-25 DIAGNOSIS — L97.303 DIABETIC ULCER OF ANKLE WITH NECROSIS OF MUSCLE (HCC): ICD-10-CM

## 2024-04-25 DIAGNOSIS — I73.9 PAD (PERIPHERAL ARTERY DISEASE) (HCC): ICD-10-CM

## 2024-04-25 DIAGNOSIS — E11.622 DIABETIC ULCER OF ANKLE WITH NECROSIS OF MUSCLE (HCC): ICD-10-CM

## 2024-04-25 PROCEDURE — 11042 DBRDMT SUBQ TIS 1ST 20SQCM/<: CPT | Performed by: STUDENT IN AN ORGANIZED HEALTH CARE EDUCATION/TRAINING PROGRAM

## 2024-04-25 PROCEDURE — 97597 DBRDMT OPN WND 1ST 20 CM/<: CPT | Performed by: STUDENT IN AN ORGANIZED HEALTH CARE EDUCATION/TRAINING PROGRAM

## 2024-04-25 RX ORDER — LIDOCAINE 40 MG/G
CREAM TOPICAL ONCE
Status: COMPLETED | OUTPATIENT
Start: 2024-04-25 | End: 2024-04-25

## 2024-04-25 RX ADMIN — LIDOCAINE: 40 CREAM TOPICAL at 08:52

## 2024-04-25 NOTE — PATIENT INSTRUCTIONS
Orders Placed This Encounter   Procedures    Wound cleansing and dressings Diabetic Ulcer Posterior;Right Ankle     Right posterior ankle wound      Wash your hands with soap and water.  Remove old dressing, discard into plastic bag and place in trash.  Cleanse the wound with unscented mild soap (unscented dove) and water prior to applying a clean dressing. Do not use tissue or cotton balls. Do not scrub the wound. Pat dry using gauze.   Shower no      Apply Polymem Ag (gray foam- blank side to wound and pattern faces outward) to the ankle wound. Cover with ABD Pad   Secure with mitch and tape.  Apply bulky dressing. Pad the bony prominences and lateral foot with ABD pad, 2 rolls cast padding, kerlex, coban. Do not wrap coban all the way up the leg.     Apply Spandagrip F to right lower leg     Change dressing EVERY OTHER DAY with [facility]         Elastic Tubular Stocking Spandagrip F - RLE   Tubular elastic bandage: Apply from base of toes to behind the knee. Apply in AM, may remove for sleep.   Avoid prolonged standing in one place.   Elevate leg(s) above the level of the heart when sitting or as much as possible.           Off-loading Instructions:      Keep weight and pressure off wound at all times.   Wear off-loading device as directed by your physician.   Put on immediately when rising in the morning and remove when going to bed.   Other:  float ankle to keep pressure off wound by elevating lower leg on pillows.     Standing Status:   Future     Standing Expiration Date:   5/2/2024    Wound cleansing and dressings Donor Site Medial;Right Leg     Right lower leg - Donor site:    Wash your hands with soap and water.  Remove old dressing, discard into plastic bag and place in trash.  Cleanse the wound with unscented mild soap (unscented dove) and water prior to applying a clean dressing. Do not use tissue or cotton balls. Do not scrub the wound. Pat dry using gauze.   Shower no      Apply Adaptic to the  lower leg wound.  Then apply Silver Alginate to the wound.  Cover with ABD Pad   Secure with mitch and tape.  Apply Spandagrip F to right lower leg         Change dressing EVERY OTHER DAY with [facility]           Elastic Tubular Stocking Spandagrip F - RLE   Tubular elastic bandage: Apply from base of toes to behind the knee. Apply in AM, may remove for sleep.   Avoid prolonged standing in one place.   Elevate leg(s) above the level of the heart when sitting or as much as possible.     Standing Status:   Future     Standing Expiration Date:   5/2/2024    Wound Procedure Treatment Diabetic Ulcer Posterior;Right Ankle     This order was created via procedure documentation    Wound Procedure Treatment Donor Site Medial;Right Leg     This order was created via procedure documentation

## 2024-04-25 NOTE — PROGRESS NOTES
Wound Procedure Treatment Diabetic Ulcer Posterior;Right Ankle    Performed by: Quiana Cardoza RN  Authorized by: Jennifer Gann DPM  Associated wounds:   Wound 02/28/24 Diabetic Ulcer Ankle Posterior;Right  Wound cleansed with:  NSS  Applied to periwound:  Ammonium lactate  Applied primary dressing:  Silver and Polymem foam  Applied secondary dressing:  ABD, Gauze and Cast padding  Wound secured with:  Alicia, Tape and Coban  Wound Procedure Treatment Donor Site Medial;Right Leg    Performed by: Quiana Cardoza RN  Authorized by: Jennifer Gann DPM  Associated wounds:   Wound 03/15/24 Donor Site Leg Medial;Right  Wound cleansed with:  NSS  Applied to periwound:  Ammonium lactate  Applied primary dressing:  Non adherent contact layer, Silver and Calcium alginate  Applied secondary dressing:  Gauze  Wound secured with:  Alicia, Tape, Elastic tubular stocking and Size F

## 2024-04-25 NOTE — PROGRESS NOTES
Patient ID: Lona Cedeno is a 78 y.o. female Date of Birth 1945     Diagnosis:  1. Open wound of right lower extremity, subsequent encounter  -     lidocaine (LMX) 4 % cream  -     Wound cleansing and dressings Diabetic Ulcer Posterior;Right Ankle; Future  -     Wound cleansing and dressings Donor Site Medial;Right Leg; Future    2. 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 PAD (peripheral artery disease) (HCC)  -     Wound cleansing and dressings Diabetic Ulcer Posterior;Right Ankle; Future  -     Wound cleansing and dressings Donor Site Medial;Right Leg; Future  -     Wound Procedure Treatment Donor Site Medial;Right Leg       Diagnosis ICD-10-CM Associated Orders   1. Open wound of right lower extremity, subsequent encounter  S81.801D lidocaine (LMX) 4 % cream     Wound cleansing and dressings Diabetic Ulcer Posterior;Right Ankle     Wound cleansing and dressings Donor Site Medial;Right Leg      2. 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 PAD (peripheral artery disease) ([facility])  I73.9 Wound cleansing and dressings Diabetic Ulcer Posterior;Right Ankle     Wound cleansing and dressings Donor Site Medial;Right Leg     Wound Procedure Treatment Donor Site Medial;Right Leg           -I recommend close monitoring with local wound care as detailed below  -Patient will benefit from skin graft substitute, insurance approval send recommending new ABIs  postintervention. Order for arterial duplex in.   -Discussed importance of strict offloading to the wound area  -Discussed importance of glycemic control proper protein intake  -Patient was educated on clinical signs of wound infection if present she is to call my office immediately  -Return in 1 week for follow-up    Chief Complaint   Patient presents with    Follow Up Wound Care Visit     RLE wound care           Subjective:   78-year-old female with past medical history as detailed below presents for continued wound treatment to the right lower extremity.  No other complaints.        The following portions of the patient's history were reviewed and updated as appropriate:   Patient Active Problem List   Diagnosis    Type 2 diabetes mellitus with diabetic polyneuropathy (HCC)    Essential hypertension    Anxiety and depression    Medicare annual wellness visit, subsequent    GERD without esophagitis    Urinary incontinence    Degenerative lumbar disc    Lactic acidosis    Hypomagnesemia    Bladder neoplasm    Left renal mass    PAD (peripheral artery disease) ([facility])    Abnormal CT of the chest    COPD, severity to be determined ([facility])    Tobacco use disorder    Age-related osteoporosis without current pathological fracture    Ankle ulcer, right, with fat layer exposed ([facility])    Diabetic foot ulcer with osteomyelitis ([facility])    Preoperative cardiovascular examination    Constipation    Hematoma    Anemia    At risk for venous thromboembolism (VTE)    At high risk for skin breakdown    Wound of skin    Impaired mobility and activities of daily living    Leukocytosis    Surgical site infection    Sepsis without acute organ dysfunction (HCC)    Other dietary vitamin B12 deficiency anemia    Sacral wound    Acute respiratory failure with hypoxia ([facility])    Malignant neoplasm of renal pelvis, unspecified laterality ([facility])     Past Medical History:   Diagnosis Date    Anxiety     Closed fracture of coccyx ([facility]) 05/24/2023     Diabetes mellitus (HCC)     GERD (gastroesophageal reflux disease)     Hyperlipidemia     Hypertension     IBS (irritable bowel syndrome)     Shoulder fracture      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;  Surgeon: Cristian Child MD;  Location: [facility];  Service: Urology    EVACUATION OF HEMATOMA Right 2024    Procedure: EVACUATION/ DRAINAGE CHEST WALL  HEMATOMA;  Surgeon: Seth Hoyos MD;  Location: [facility];  Service: Vascular    FL RETROGRADE PYELOGRAM  2023    HERNIA REPAIR      umbilicus w/ mesh    CO BYPASS W/VEIN AXILLARY-FEMORAL Right 2024    Procedure: BYPASS AXILLO-FEMORAL, RIGHT WITH 8MM RINGED PTFE GRAFT;  Surgeon: Seth Hoyos MD;  Location: [facility];  Service: Vascular    SPLIT THICKNESS SKIN GRAFT Right 2024    Procedure: SKIN GRAFT SPLIT THICKNESS (STSG)  EXTREMITY, Wound vac dressing change;  Surgeon: Rigo Yeboah DPM;  Location: [facility];  Service: Podiatry    WOUND DEBRIDEMENT Right 2024    Procedure: RIGHT WOUND AND ACHILLES DEBRIDEMENT FOOT/TOE (WASH OUT); PARTIAL AMPUTATION DISTAL 2ND TOE;  Surgeon: Aaron Montero DPM;  [facility]: [facility];  Service: Podiatry    WOUND DEBRIDEMENT Right 2024    Procedure: DEBRIDEMENT RIGHT GROIN  WOUND AND WASHOUT, APPLICATION OF WOUND VAC;  Surgeon: Suly Muro DO;  [facility];  Service: Vascular    WOUND DEBRIDEMENT Right 2024    Procedure: DEBRIDEMENT LOWER EXTREMITY, washout;  Surgeon: Suly  DO Bhaskar;  [facility]: [facility];  Service: Vascular     Social History     Socioeconomic History    Marital status: /Civil Union     Spouse name: None    Number of children: None    Years of education: None    Highest education level: None   Occupational History    None   Tobacco Use    Smoking status: Former     Current packs/day: 1.00     Average packs/day: 1 pack/day for 63.3 years (63.3 ttl pk-yrs)     Types: Cigarettes     Start date: 1961    Smokeless tobacco: Never    Tobacco comments:     Quit January 31, 2024   Vaping Use    Vaping status: Never Used   Substance and Sexual Activity    Alcohol use: Not Currently    Drug use: Never    Sexual activity: Not Currently   Other Topics Concern    None   Social History Narrative    None     Social Determinants of Health     Financial Resource Strain: Low Risk  (2/9/2023)    Overall Financial Resource Strain (CARDIA)     Difficulty of Paying Living Expenses: Not hard at all   Food Insecurity: No Food Insecurity (4/18/2024)    Hunger Vital Sign     Worried About Running Out of Food in the Last Year: Never true     Ran Out of Food in the Last Year: Never true   Transportation Needs: No Transportation Needs (4/18/2024)    PRAPARE - Transportation     Lack of Transportation (Medical): No     Lack of Transportation (Non-Medical): No   Physical Activity: Not on file   Stress: Not on file   Social Connections: Not on file   Intimate Partner Violence: Not on file   Housing Stability: Low Risk  (4/18/2024)    Housing Stability Vital Sign     Unable to Pay for Housing in the Last Year: No     Number of Places Lived in the Last Year: 1     Unstable Housing in the Last Year: No        Current Outpatient Medications:     acetaminophen (TYLENOL) 325 mg tablet, Take 3 tablets (975 mg total) by mouth every 8 (eight) hours, Disp: , Rfl:     acetaminophen-codeine (TYLENOL with CODEINE #3) 300-30 MG per tablet, Take 1 tablet by mouth every 4 (four) hours as needed for  moderate pain, Disp: 30 tablet, Rfl: 0    albuterol (Ventolin HFA) 90 mcg/act inhaler, Inhale 2 puffs every 6 (six) hours as needed for wheezing, Disp: 18 g, Rfl: 3    Apoaequorin 20 MG CAPS, Take 20 mg by mouth in the morning, Disp: 90 capsule, Rfl: 0    Aspirin (ASPIR-81 PO), take one by mouth daily, Disp: , Rfl:     atorvastatin (LIPITOR) 10 mg tablet, Take 1 tablet (10 mg total) by mouth daily with dinner, Disp: 90 tablet, Rfl: 0    Blood Glucose Monitoring Suppl (OneTouch Verio) w/Device KIT, Use 2 (two) times a day, Disp: 1 kit, Rfl: 0    buPROPion (WELLBUTRIN XL) 300 mg 24 hr tablet, Take 1 tablet (300 mg total) by mouth daily, Disp: 90 tablet, Rfl: 0    clonazePAM (KlonoPIN) 1 mg tablet, Take 1 tablet (1 mg total) by mouth every evening, Disp: 30 tablet, Rfl: 0    cyanocobalamin (VITAMIN B-12) 1000 MCG tablet, Take 1 tablet (1,000 mcg total) by mouth daily, Disp: 90 tablet, Rfl: 0    Fluticasone-Salmeterol (Advair Diskus) 250-50 mcg/dose inhaler, Inhale 1 puff 2 (two) times a day Rinse mouth after use., Disp: 60 blister, Rfl: 2    Lancets (OneTouch Delica Plus Luwrce20V) MISC, TEST TWO TIMES A DAY, Disp: 100 each, Rfl: 5    levofloxacin (LEVAQUIN) 500 mg tablet, , Disp: , Rfl:     lisinopril (ZESTRIL) 5 mg tablet, Take 1 tablet (5 mg total) by mouth daily, Disp: 90 tablet, Rfl: 0    metFORMIN (GLUCOPHAGE) 1000 MG tablet, Take 1 tablet (1,000 mg total) by mouth 2 (two) times a day with meals, Disp: 180 tablet, Rfl: 0    Misc. Devices (Carex Coccyx Cushion) MISC, Use in the morning, Disp: 1 each, Rfl: 0    naloxone (NARCAN) 4 mg/0.1 mL nasal spray, Administer 1 spray into a nostril. If no response after 2-3 minutes, give another dose in the other nostril using a new spray., Disp: 1 each, Rfl: 1    nicotine (NICODERM CQ) 14 mg/24hr TD 24 hr patch, Place 1 patch on the skin over 24 hours daily, Disp: 28 patch, Rfl: 0    Omega-3 Fatty Acids (FISH OIL PO), 1 cap oral daily, Disp: , Rfl:     pantoprazole (PROTONIX)  40 mg tablet, Take 1 tablet (40 mg total) by mouth daily, Disp: 90 tablet, Rfl: 0    polyethylene glycol (MIRALAX) 17 g packet, Take 17 g by mouth daily, Disp: , Rfl:     pregabalin (LYRICA) 200 MG capsule, TAKE ONE CAPSULE BY MOUTH THREE TIMES A DAY, Disp: 90 capsule, Rfl: 0    rivaroxaban (Xarelto) 2.5 mg tablet, Take 1 tablet (2.5 mg total) by mouth 2 (two) times a day, Disp: 60 tablet, Rfl: 0  No current facility-administered medications for this visit.  Family History   Problem Relation Age of Onset    COPD Mother     Emphysema Mother     COPD Father     Emphysema Father       Review of Systems   All other systems reviewed and are negative.    Allergies:  Paroxetine, Adhesive [medical tape], Carafate [sucralfate], Sulfa antibiotics, and Sulfamethoxazole-trimethoprim      Objective:  /68   Pulse 70   Temp (!) 96.1 °F (35.6 °C)   Resp 18     Physical Exam  Vitals reviewed.   Musculoskeletal:      Right lower le+ Edema present.      Comments: Right posterior ankle ulcer probable to subcutaneous tissue and Achilles tendon.  Hypergranulation tissue noted however less than last evaluated postdebridement wound was bleeding and 100% granular.     Superficial ulcer noted to the level of dermal tissue on the right leg where the donor site with mild sloughing today.               Wound 24 Diabetic Ulcer Ankle Posterior;Right (Active)   Wound Image   24 0919   Wound Description Granulation tissue;Pink;Epithelialization;Beefy red 24 0849   Ani-wound Assessment Erythema;Maceration;Edema 24 0946   Wound Length (cm) 4.9 cm 24 0849   Wound Width (cm) 1.9 cm 24 0849   Wound Depth (cm) 0.1 cm 24 0849   Wound Surface Area (cm^2) 9.31 cm^2 24 0849   Wound Volume (cm^3) 0.931 cm^3 24 0849   Calculated Wound Volume (cm^3) 0.93 cm^3 24 0849   Change in Wound Size % 80.63 24 0849   Drainage Amount Moderate 24 0849   Drainage Description  "Serosanguineous;Yellow 04/25/24 0849   Non-staged Wound Description Full thickness 04/25/24 0849   Treatments Irrigation with NSS 04/25/24 0849   Patient Tolerance Tolerated well 04/25/24 0849   Dressing Status Intact;Old drainage 04/25/24 0849       Wound 03/15/24 Donor Site Leg Medial;Right (Active)   Wound Image   04/25/24 0848   Wound Description Granulation tissue;Bleeding 04/25/24 0848   Ani-wound Assessment Pink;Fragile 04/25/24 0848   Wound Length (cm) 4.3 cm 04/25/24 0848   Wound Width (cm) 1.3 cm 04/25/24 0848   Wound Depth (cm) 0.1 cm 04/25/24 0848   Wound Surface Area (cm^2) 5.59 cm^2 04/25/24 0848   Wound Volume (cm^3) 0.559 cm^3 04/25/24 0848   Calculated Wound Volume (cm^3) 0.56 cm^3 04/25/24 0848   Change in Wound Size % 76.67 04/25/24 0848   Drainage Amount Moderate 04/25/24 0848   Drainage Description Serosanguineous;Brown;Bloody 04/25/24 0848   Non-staged Wound Description Full thickness 04/25/24 0848   Treatments Irrigation with NSS 04/18/24 0946   Patient Tolerance Tolerated well 04/18/24 0946   Dressing Status Intact;Old drainage 04/25/24 0848                         Debridement   Wound 02/28/24 Diabetic Ulcer Ankle Posterior;Right    Universal Protocol:  Consent: Verbal consent obtained.  Risks and benefits: risks, benefits and alternatives were discussed  Consent given by: patient  Time out: Immediately prior to procedure a \"time out\" was called to verify the correct patient, procedure, equipment, support staff and site/side marked as required.  Patient understanding: patient states understanding of the procedure being performed  Patient identity confirmed: verbally with patient    Debridement Details  Performed by: physician  Debridement type: surgical  Level of debridement: subcutaneous tissue  Pain control: lidocaine 4%      Post-debridement measurements  Length (cm): 1.9  Width (cm): 2  Depth (cm): 0.2  Percent debrided: 100%  Surface Area (cm^2): 3.8  Area Debrided (cm^2): 3.8  Volume " (cm^3): 0.76    Tissue and other material debrided: subcutaneous tissue  Devitalized tissue debrided: biofilm, exudate, fibrin and slough  Instrument(s) utilized: curette  Bleeding: small  Hemostasis obtained with: pressure  Procedural pain (0-10): insensate  Post-procedural pain: insensate   Response to treatment: procedure was tolerated well         Results from last 6 Months   Lab Units 02/14/24  1539   WOUND CULTURE  1+ Growth of Klebsiella pneumoniae*  1+ Growth of Proteus vulgaris group*  Few Colonies of         Wound Instructions:  Orders Placed This Encounter   Procedures    Wound cleansing and dressings Diabetic Ulcer Posterior;Right Ankle     Right posterior ankle wound      Wash your hands with soap and water.  Remove old dressing, discard into plastic bag and place in trash.  Cleanse the wound with unscented mild soap (unscented dove) and water prior to applying a clean dressing. Do not use tissue or cotton balls. Do not scrub the wound. Pat dry using gauze.   Shower no      Apply Polymem Ag (gray foam- blank side to wound and pattern faces outward) to the ankle wound. Cover with ABD Pad   Secure with mitch and tape.  Apply bulky dressing. Pad the bony prominences and lateral foot with ABD pad, 2 rolls cast padding, kerlex, coban. Do not wrap coban all the way up the leg.     Apply Spandagrip F to right lower leg     Change dressing EVERY OTHER DAY with [facility]         Elastic Tubular Stocking Spandagrip F - RLE   Tubular elastic bandage: Apply from base of toes to behind the knee. Apply in AM, may remove for sleep.   Avoid prolonged standing in one place.   Elevate leg(s) above the level of the heart when sitting or as much as possible.           Off-loading Instructions:      Keep weight and pressure off wound at all times.   Wear off-loading device as directed by your physician.   Put on immediately when rising in the morning and remove when going to bed.   Other:  float ankle to keep pressure  "off wound by elevating lower leg on pillows.     Standing Status:   Future     Standing Expiration Date:   5/2/2024    Wound cleansing and dressings Donor Site Medial;Right Leg     Right lower leg - Donor site:    Wash your hands with soap and water.  Remove old dressing, discard into plastic bag and place in trash.  Cleanse the wound with unscented mild soap (unscented dove) and water prior to applying a clean dressing. Do not use tissue or cotton balls. Do not scrub the wound. Pat dry using gauze.   Shower no      Apply Adaptic to the lower leg wound.  Then apply Silver Alginate to the wound.  Cover with ABD Pad   Secure with mitch and tape.  Apply Spandagrip F to right lower leg         Change dressing EVERY OTHER DAY with [facility]           Elastic Tubular Stocking Spandagrip F - RLE   Tubular elastic bandage: Apply from base of toes to behind the knee. Apply in AM, may remove for sleep.   Avoid prolonged standing in one place.   Elevate leg(s) above the level of the heart when sitting or as much as possible.     Standing Status:   Future     Standing Expiration Date:   5/2/2024    Wound Procedure Treatment Diabetic Ulcer Posterior;Right Ankle     This order was created via procedure documentation    Wound Procedure Treatment Donor Site Medial;Right Leg     This order was created via procedure documentation    Debridement     This order was created via procedure documentation         Jennifer Gann DPM      Portions of the record may have been created with voice recognition software. Occasional wrong word or \"sound a like\" substitutions may have occurred due to the inherent limitations of voice recognition software. Read the chart carefully and recognize, using context, where substitutions have occurred.      "

## 2024-04-25 NOTE — PROGRESS NOTES
"Debridement   Wound 03/15/24 Donor Site Leg Medial;Right    Universal Protocol:  Consent: Verbal consent obtained.  Risks and benefits: risks, benefits and alternatives were discussed  Consent given by: patient  Time out: Immediately prior to procedure a \"time out\" was called to verify the correct patient, procedure, equipment, support staff and site/side marked as required.  Patient understanding: patient states understanding of the procedure being performed  Patient identity confirmed: verbally with patient    Debridement Details  Performed by: physician  Debridement type: selective  Pain control: lidocaine 4%      Post-debridement measurements  Length (cm): 4.3  Width (cm): 1.3  Depth (cm): 0.1  Percent debrided: 100%  Surface Area (cm^2): 5.59  Area Debrided (cm^2): 5.59  Volume (cm^3): 0.56    Devitalized tissue debrided: biofilm, fibrin and slough  Instrument(s) utilized: curette  Bleeding: small  Hemostasis obtained with: pressure  Procedural pain (0-10): insensate  Post-procedural pain: insensate   Response to treatment: procedure was tolerated well        "

## 2024-04-27 ENCOUNTER — APPOINTMENT (EMERGENCY)
Dept: CT IMAGING | Facility: HOSPITAL | Age: 79
End: 2024-04-27
Payer: COMMERCIAL

## 2024-04-27 ENCOUNTER — HOSPITAL ENCOUNTER (EMERGENCY)
Facility: HOSPITAL | Age: 79
Discharge: HOME | End: 2024-04-27
Attending: FAMILY MEDICINE
Payer: COMMERCIAL

## 2024-04-27 ENCOUNTER — HOME CARE VISIT (OUTPATIENT)
Dept: HOME HEALTH SERVICES | Facility: HOME HEALTHCARE | Age: 79
End: 2024-04-27
Payer: COMMERCIAL

## 2024-04-27 ENCOUNTER — NURSE TRIAGE (OUTPATIENT)
Dept: OTHER | Facility: OTHER | Age: 79
End: 2024-04-27

## 2024-04-27 VITALS
RESPIRATION RATE: 18 BRPM | HEART RATE: 91 BPM | TEMPERATURE: 96.8 F | SYSTOLIC BLOOD PRESSURE: 110 MMHG | DIASTOLIC BLOOD PRESSURE: 64 MMHG | OXYGEN SATURATION: 92 %

## 2024-04-27 VITALS
DIASTOLIC BLOOD PRESSURE: 58 MMHG | BODY MASS INDEX: 30.41 KG/M2 | RESPIRATION RATE: 18 BRPM | HEART RATE: 87 BPM | OXYGEN SATURATION: 100 % | SYSTOLIC BLOOD PRESSURE: 115 MMHG | TEMPERATURE: 99.5 F | WEIGHT: 160.94 LBS
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
## 2024-04-27 DIAGNOSIS — M25.511 CHRONIC RIGHT SHOULDER PAIN: ICD-10-CM

## 2024-04-27 DIAGNOSIS — G89.29 CHRONIC RIGHT SHOULDER PAIN: ICD-10-CM

## 2024-04-27 PROCEDURE — 99284 EMERGENCY DEPT VISIT MOD MDM: CPT | Performed by: FAMILY MEDICINE

## 2024-04-27 PROCEDURE — 70450 CT HEAD/BRAIN W/O DYE: CPT

## 2024-04-27 PROCEDURE — 72125 CT NECK SPINE W/O DYE: CPT

## 2024-04-27 PROCEDURE — G0299 HHS/HOSPICE OF RN EA 15 MIN: HCPCS

## 2024-04-27 PROCEDURE — 73200 CT UPPER EXTREMITY W/O DYE: CPT

## 2024-04-27 PROCEDURE — 99284 EMERGENCY DEPT VISIT MOD MDM: CPT

## 2024-04-27 PROCEDURE — 36415 COLL VENOUS BLD VENIPUNCTURE: CPT | Performed by: FAMILY MEDICINE

## 2024-04-27 NOTE — ED PROVIDER NOTES
Emergency Department Trauma Note  Lona Cedeno 78 y.o. female MRN: [id number]  Unit/Bed#: TR 03/TR 03 Encounter: [id number]      Trauma Alert: Trauma Acuity: Trauma Evaluation  Model of Arrival: Mode of Arrival:  (privite vehicle) via    Trauma Team: Current Providers  Attending Provider: Abimael Sandy MD  Registered Nurse: Diana Isbell RN  Consultants:     None      History of Present Illness     Chief Complaint:   Chief Complaint   Patient presents with    Fall     Fall head strike no loc on thinner        HPI:  Lona Cedeno is a 78 y.o. female who presents with a complaint of fall.  The patient states that she was going to the bathroom slipped and fell.  Patient is on Xarelto states that she called her home nurse who told her to come to the ED.  Patient is denying any headache blurry vision double vision she denies any neck pain at this time however states that she is concerned and they wanted to get head and neck CT.  Patient also states that she recently had a surgery on her right shoulder and wanted to make sure that she did not refracture.  Patient states the pain is chronic and it did not increase after the fall.  Patient is otherwise stable awake alert oriented x 3 GCS 15.  Patient is nontoxic-appearing and does not offer any complaint.  Mechanism:Details of Incident: fell oob hit head on hardwood floor Injury Date: 04/27/24 Injury Time: 0600      HPI  Review of Systems   Constitutional:  Negative for chills and fever.   HENT:  Negative for rhinorrhea and sore throat.    Eyes:  Negative for visual disturbance.   Respiratory:  Negative for cough and shortness of breath.    Cardiovascular:  Negative for chest pain and leg swelling.   Gastrointestinal:  Negative for abdominal pain, diarrhea, nausea and vomiting.   Genitourinary:  Negative for dysuria.   Musculoskeletal:  Negative for back pain and myalgias.   Skin:  Negative for rash.   Neurological:  Negative for dizziness and headaches.    Psychiatric/Behavioral:  Negative for confusion.    All other systems reviewed and are negative.      Historical Information     Immunizations:   Immunization History   Administered Date(s) Administered    COVID-19 MODERNA VACC 0.5 ML IM 2021, 2021, 2021    COVID-19 Moderna Vac BIVALENT 12 Yr+ IM 0.5 ML 2022    Hep B / HiB 2013, 2013, 10/03/2013    INFLUENZA 2014, 10/18/2022    Influenza, high dose seasonal 0.7 mL 2019, 10/02/2020, 10/27/2021, 10/18/2022, 2023    Pneumococcal Conjugate 13-Valent 2020    Pneumococcal Conjugate Vaccine 20-valent (Pcv20), Polysace 2023, 2023    Tdap 2017       Past Medical History:   Diagnosis Date    Anxiety     Closed fracture of coccyx (HCC) 2023    Diabetes mellitus (HCC)     GERD (gastroesophageal reflux disease)     Hyperlipidemia     Hypertension     IBS (irritable bowel syndrome)     Shoulder fracture        Family History   Problem Relation Age of Onset    COPD Mother     Emphysema Mother     COPD Father     Emphysema Father      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;  Surgeon: Cristian Child MD;  Location: [facility];  Service: Urology    EVACUATION OF HEMATOMA Right 2024    Procedure: EVACUATION/ DRAINAGE CHEST WALL  HEMATOMA;  Surgeon: Seth Hoyos MD;  Location: [facility];  Service: Vascular    FL RETROGRADE PYELOGRAM  2023    HERNIA REPAIR      umbilicus w/ mesh    NM BYPASS W/VEIN  AXILLARY-FEMORAL Right 1/31/2024    Procedure: BYPASS AXILLO-FEMORAL, RIGHT WITH 8MM RINGED PTFE GRAFT;  Surgeon: Seth Hoyos MD;  Location: [facility];  Service: Vascular    SPLIT THICKNESS SKIN GRAFT Right 2/28/2024    Procedure: SKIN GRAFT SPLIT THICKNESS (STSG)  EXTREMITY, Wound vac dressing change;  Surgeon: Rigo Yeboah DPM;  Location: [facility];  Service: Podiatry    WOUND DEBRIDEMENT Right 2/4/2024    Procedure: RIGHT WOUND AND ACHILLES DEBRIDEMENT FOOT/TOE (WASH OUT); PARTIAL AMPUTATION DISTAL 2ND TOE;  Surgeon: Aaron Montero DPM;  Location: [facility];  Service: Podiatry    WOUND DEBRIDEMENT Right 2/14/2024    Procedure: DEBRIDEMENT RIGHT GROIN  WOUND AND WASHOUT, APPLICATION OF WOUND VAC;  Surgeon: Suly Muro DO;  [facility]: [facility];  Service: Vascular    WOUND DEBRIDEMENT Right 2/19/2024    Procedure: DEBRIDEMENT LOWER EXTREMITY, washout;  Surgeon: Suly Muro DO;  Location: [facility];  Service: Vascular     Social History     Tobacco Use    Smoking status: Former     Current packs/day: 1.00     Average packs/day: 1 pack/day for 63.3 years (63.3 ttl pk-yrs)     Types: Cigarettes     Start date: 1961    Smokeless tobacco: Never    Tobacco comments:     Quit January 31, 2024   Vaping Use    Vaping status: Never Used   Substance Use Topics    Alcohol use: Not Currently    Drug use: Never     E-Cigarette/Vaping    E-Cigarette Use Never User      E-Cigarette/Vaping Substances    Nicotine No     THC No     CBD No     Flavoring No     Other No     Unknown No        Family History: non-contributory    Meds/Allergies   Prior to Admission Medications   Prescriptions Last Dose Informant Patient Reported? Taking?   Apoaequorin 20 MG CAPS   No No   Sig: Take 20 mg by mouth in the morning   Aspirin (ASPIR-81 PO)  Self Yes No   Sig: take one by mouth daily   Blood Glucose Monitoring Suppl (OneTouch Verio) w/Device KIT   No No   Sig: Use 2 (two) times a day   Fluticasone-Salmeterol  (Advair Diskus) 250-50 mcg/dose inhaler   No No   Sig: Inhale 1 puff 2 (two) times a day Rinse mouth after use.   Lancets (OneTouch Delica Plus [id number]) MISC   No No   Sig: TEST TWO TIMES A DAY   Misc. Devices (Carex Coccyx Cushion) MISC   No No   Sig: Use in the morning   Omega-3 Fatty Acids (FISH OIL PO)  Self Yes No   Si cap oral daily   acetaminophen (TYLENOL) 325 mg tablet   No No   Sig: Take 3 tablets (975 mg total) by mouth every 8 (eight) hours   acetaminophen-codeine (TYLENOL with CODEINE #3) 300-30 MG per tablet   No No   Sig: Take 1 tablet by mouth every 4 (four) hours as needed for moderate pain   albuterol (Ventolin HFA) 90 mcg/act inhaler   No No   Sig: Inhale 2 puffs every 6 (six) hours as needed for wheezing   atorvastatin (LIPITOR) 10 mg tablet   No No   Sig: Take 1 tablet (10 mg total) by mouth daily with dinner   buPROPion (WELLBUTRIN XL) 300 mg 24 hr tablet   No No   Sig: Take 1 tablet (300 mg total) by mouth daily   clonazePAM (KlonoPIN) 1 mg tablet   No No   Sig: Take 1 tablet (1 mg total) by mouth every evening   cyanocobalamin (VITAMIN B-12) 1000 MCG tablet   No No   Sig: Take 1 tablet (1,000 mcg total) by mouth daily   levofloxacin (LEVAQUIN) 500 mg tablet   Yes No   lisinopril (ZESTRIL) 5 mg tablet   No No   Sig: Take 1 tablet (5 mg total) by mouth daily   metFORMIN (GLUCOPHAGE) 1000 MG tablet   No No   Sig: Take 1 tablet (1,000 mg total) by mouth 2 (two) times a day with meals   naloxone (NARCAN) 4 mg/0.1 mL nasal spray   No No   Sig: Administer 1 spray into a nostril. If no response after 2-3 minutes, give another dose in the other nostril using a new spray.   nicotine (NICODERM CQ) 14 mg/24hr TD 24 hr patch   No No   Sig: Place 1 patch on the skin over 24 hours daily   pantoprazole (PROTONIX) 40 mg tablet   No No   Sig: Take 1 tablet (40 mg total) by mouth daily   polyethylene glycol (MIRALAX) 17 g packet   No No   Sig: Take 17 g by mouth daily   pregabalin (LYRICA) 200 MG capsule    No No   Sig: TAKE ONE CAPSULE BY MOUTH THREE TIMES A DAY   rivaroxaban (Xarelto) 2.5 mg tablet   No No   Sig: Take 1 tablet (2.5 mg total) by mouth 2 (two) times a day      Facility-Administered Medications: None       Allergies   Allergen Reactions    Paroxetine Other (See Comments)     Became suicidal    Adhesive [Medical Tape] Itching and Blisters    Carafate [Sucralfate] Other (See Comments)     Mouth sores, tongue sore    Sulfa Antibiotics Hives and Rash    Sulfamethoxazole-Trimethoprim Hives       PHYSICAL EXAM    PE limited by:     Objective   Vitals:   First set: Temperature: 99.5 °F (37.5 °C) (04/27/24 1118)  Pulse: 90 (04/27/24 1118)  Respirations: 18 (04/27/24 1118)  Blood Pressure: 141/62 (04/27/24 1118)  SpO2: 94 % (04/27/24 1118)    Primary Survey:   (A) Airway: Patent no blood in the oropharynx  (B) Breathing: Lungs are clear to auscultation bilateral  (C) Circulation: Pulses:   normal  (D) Disabliity:  GCS Total:  15  (E) Expose:  Completed    Secondary Survey: (Click on Physical Exam tab above)  Physical Exam  Vitals and nursing note reviewed.   Constitutional:       Appearance: She is well-developed.   HENT:      Head: Normocephalic and atraumatic.      Right Ear: External ear normal.      Left Ear: External ear normal.      Nose: Nose normal.      Mouth/Throat:      Mouth: Mucous membranes are moist.      Pharynx: No oropharyngeal exudate.   Eyes:      General: No scleral icterus.        Right eye: No discharge.         Left eye: No discharge.      Conjunctiva/sclera: Conjunctivae normal.      Pupils: Pupils are equal, round, and reactive to light.   Cardiovascular:      Rate and Rhythm: Normal rate and regular rhythm.      Pulses: Normal pulses.      Heart sounds: Normal heart sounds.   Pulmonary:      Effort: Pulmonary effort is normal. No respiratory distress.      Breath sounds: Normal breath sounds. No wheezing.   Abdominal:      General: Bowel sounds are normal.      Palpations: Abdomen is  soft.   Musculoskeletal:         General: No swelling or tenderness. Normal range of motion.      Cervical back: Normal range of motion and neck supple.      Comments: Right shoulder: ROm intact no swelling no tenderness noted.  There is no crepitus noted.  No signs of infection.   Lymphadenopathy:      Cervical: No cervical adenopathy.   Skin:     General: Skin is warm and dry.      Capillary Refill: Capillary refill takes less than 2 seconds.   Neurological:      General: No focal deficit present.      Mental Status: She is alert and oriented to person, place, and time.   Psychiatric:         Mood and Affect: Mood normal.         Behavior: Behavior normal.         Cervical spine cleared by clinical criteria? No (imaging required)      Invasive Devices       Peripheral Intravenous Line  Duration             Peripheral IV 04/27/24 Proximal;Right;Ventral (anterior) Forearm <1 day                    Lab Results:   Results Reviewed       None                   Imaging Studies:   Direct to CT: No  CT upper extremity wo contrast right   Final Result by Bjorn King MD (04/27 0959)   1. Chronic right humeral head and neck fracture with bone remodeling at the humeral head consistent with osseous bridging/healing. 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   2. Evidence of right subclavian stent placement with adjacent fluid collection likely reflecting a postoperative seroma. Abscess cannot be entirely excluded based upon imaging alone. Correlate clinically.   3. Reticulonodular and patchy changes right lung nonspecific and could reflect pulmonary edema pattern (favored) with infectious etiology not excluded.         Workstation performed: [id number]         TRAUMA - CT head wo contrast   Final Result by Slava Banerjee MD (04/27 1200)      No acute intracranial abnormality.                  Workstation performed: [id number]         TRAUMA - CT spine cervical wo contrast   Final Result  by Slava Banerjee MD (04/27 6948)      No cervical spine fracture or traumatic malalignment.                  Workstation performed: [id number]               Procedures  Procedures         ED Course           Medical Decision Making  Lona Cedeno is a 78 y.o. female who presents with a complaint of fall.  The patient states that she was going to the bathroom slipped and fell.  Patient is on Xarelto states that she called her home nurse who told her to come to the ED.  Patient is denying any headache blurry vision double vision she denies any neck pain at this time however states that she is concerned and they wanted to get head and neck CT.  Patient also states that she recently had a surgery on her right shoulder and wanted to make sure that she did not refracture.  Patient states the pain is chronic and it did not increase after the fall.  Patient is otherwise stable awake alert oriented x 3 GCS 15.  Patient is nontoxic-appearing and does not offer any complaint.    Differential diagnoses include intracranial bleed/skull fracture/shoulder fracture/cervical fracture  Plan will obtain CT head CT cervical spine as well as a CT shoulder since the patient recently had a surgery  CT reviewed within normal limits that CT shoulder was reviewed which shows that there is fluid around the eye which is most likely secondary to postoperative changes.  Patient denies any cough any chest pain shortness of breath on physical exam I do not appreciate any swelling redness around the site this was discussed with patient patient says she will follow-up with her PCP and surgeon patient is requesting to be discharged home.  Disposition discharge home with the strict precaution to return to the ED if notice any pain swelling redness around the surgical site as well as any worsening pain in the shoulder immediate return back to the ED.  Patient verbalized understand the plan discharge home.    Amount and/or Complexity of Data  Reviewed  Labs: ordered.  Radiology: ordered.                Disposition  Priority One Transfer: No  Final diagnoses:   Fall, initial encounter   Chronic right shoulder pain     Time reflects when diagnosis was documented in both MDM as applicable and the Disposition within this note       Time User Action Codes Description Comment    4/27/2024  2:06 PM Abimael Sandy Add [W19.XXXA] Fall, initial encounter     4/27/2024  2:07 PM Abimael Sandy Add [M25.511,  G89.29] Chronic right shoulder pain           ED Disposition       ED Disposition   Discharge    Condition   Stable    Date/Time   Sat Apr 27, 2024  1:24 PM    Comment   Lona Cedeno discharge to home/self care.                   Follow-up Information       Follow up With Specialties Details Why Contact Info    Vivek Preston, DO Family Medicine Call today for follow up [address] [place] [phone]            Patient's Medications   Discharge Prescriptions    No medications on file     No discharge procedures on file.    PDMP Review         Value Time User    PDMP Reviewed  Yes 3/13/2024  8:35 PM Ashley Depadua, MD            ED Provider  Electronically Signed by           Abimael Sandy MD  04/27/24 4922

## 2024-04-27 NOTE — TELEPHONE ENCOUNTER
"Patient has [facility] to home post recent axillofemoral bypass surgery. The [facility] RN just called stating the patient was transferring from bed to commode this morning and fell to the floor. She slid onto her back and did hit her head. The RN states her skin exam is normal no cuts, no bruising. She is AAOx4. She has discomfort in one shoulder, but has an existing injury there. She has no pain otherwise and states \"i'm fine\" but she is on Xarelto.    Per provider, Patient informed that because she is on Xarelto she needs a scan to make sure she does not have a head bleed from her fall. Per patient, she will call her daughter to take her for evaluation.        Reason for Disposition   [1] Caller has URGENT question AND [2] triager unable to answer question    Answer Assessment - Initial Assessment Questions  1. MECHANISM: \"How did the fall happen?\"      Transferring from bed to the commode  2. DOMESTIC VIOLENCE AND ELDER ABUSE SCREENING: \"Did you fall because someone pushed you or tried to hurt you?\" If Yes, ask: \"Are you safe now?\"      Denies  3. ONSET: \"When did the fall happen?\" (e.g., minutes, hours, or days ago)      Hours ago  4. LOCATION: \"What part of the body hit the ground?\" (e.g., back, buttocks, head, hips, knees, hands, head, stomach)      Back and head  5. INJURY: \"Did you hurt (injure) yourself when you fell?\" If Yes, ask: \"What did you injure? Tell me more about this?\" (e.g., body area; type of injury; pain severity)\"      Slipped   6. PAIN: \"Is there any pain?\" If Yes, ask: \"How bad is the pain?\" (e.g., Scale 1-10; or mild,   moderate, severe)    - NONE (0): no pain    - MILD (1-3): doesn't interfere with normal activities     - MODERATE (4-7): interferes with normal activities or awakens from sleep     - SEVERE (8-10): excruciating pain, unable to do any normal activities       Mild, shoulder (states existing injury there)  7. SIZE: For cuts, bruises, or swelling, ask: \"How large is it?\" (e.g., inches or " "centimeters)       Denies cuts, or bruises    9. OTHER SYMPTOMS: \"Do you have any other symptoms?\" (e.g., dizziness, fever, weakness; new onset or worsening).       Denies    Protocols used: Falls and Falling-ADULT-AH    "

## 2024-04-29 ENCOUNTER — HOME CARE VISIT (OUTPATIENT)
Dept: HOME HEALTH SERVICES | Facility: HOME HEALTHCARE | Age: 79
End: 2024-04-29
Payer: COMMERCIAL

## 2024-04-29 ENCOUNTER — TELEMEDICINE (OUTPATIENT)
Dept: FAMILY MEDICINE CLINIC | Facility: CLINIC | Age: 79
End: 2024-04-29
Payer: COMMERCIAL

## 2024-04-29 VITALS
TEMPERATURE: 97.3 F | OXYGEN SATURATION: 93 % | RESPIRATION RATE: 20 BRPM | SYSTOLIC BLOOD PRESSURE: 136 MMHG | HEART RATE: 80 BPM | DIASTOLIC BLOOD PRESSURE: 58 MMHG

## 2024-04-29 DIAGNOSIS — Z74.09 IMPAIRED MOBILITY AND ACTIVITIES OF DAILY LIVING: ICD-10-CM

## 2024-04-29 DIAGNOSIS — S00.93XS: Primary | ICD-10-CM

## 2024-04-29 DIAGNOSIS — Z78.9 IMPAIRED MOBILITY AND ACTIVITIES OF DAILY LIVING: ICD-10-CM

## 2024-04-29 DIAGNOSIS — T14.8XXA HEMATOMA: ICD-10-CM

## 2024-04-29 DIAGNOSIS — R07.89 CHEST WALL PAIN: ICD-10-CM

## 2024-04-29 PROBLEM — S05.11XA: Status: ACTIVE | Noted: 2024-04-29

## 2024-04-29 PROCEDURE — 99214 OFFICE O/P EST MOD 30 MIN: CPT | Performed by: FAMILY MEDICINE

## 2024-04-29 PROCEDURE — G0299 HHS/HOSPICE OF RN EA 15 MIN: HCPCS

## 2024-04-29 PROCEDURE — 1159F MED LIST DOCD IN RCRD: CPT | Performed by: FAMILY MEDICINE

## 2024-04-29 PROCEDURE — 1160F RVW MEDS BY RX/DR IN RCRD: CPT | Performed by: FAMILY MEDICINE

## 2024-04-29 PROCEDURE — G2211 COMPLEX E/M VISIT ADD ON: HCPCS | Performed by: FAMILY MEDICINE

## 2024-04-29 NOTE — ASSESSMENT & PLAN NOTE
Patient informed to be careful with all movements transfers and walking now that she fell due to her multiple co-morbidities

## 2024-04-29 NOTE — ASSESSMENT & PLAN NOTE
Hematoma and bruising anterior neck region at top of sternum.  She also has pain in her chest wall on breathing and has had difficulty with coughing.  Patient reassured symptoms are normal part of the sequelae after the fall and she should continue to heal as she has been on blood thinners and this worsens the situation between the aspirin and the other medications Xarelto

## 2024-04-29 NOTE — ASSESSMENT & PLAN NOTE
Rest wall pain after falling making it difficult to breathe and cough patient reassured follow-up with me at next visit try moist heat now

## 2024-04-29 NOTE — PROGRESS NOTES
Virtual Regular Visit    Verification of patient location:    Patient is located at Home in the following state in which I hold an active license [place]      Assessment/Plan:    Problem List Items Addressed This Visit          Blood    Hematoma     Hematoma and bruising anterior neck region at top of sternum.  She also has pain in her chest wall on breathing and has had difficulty with coughing.  Patient reassured symptoms are normal part of the sequelae after the fall and she should continue to heal as she has been on blood thinners and this worsens the situation between the aspirin and the other medications Xarelto             Care Coordination    Impaired mobility and activities of daily living     Patient informed to be careful with all movements transfers and walking now that she fell due to her multiple co-morbidities            Surgery/Wound/Pain    Contusion of head, sequela - Primary     Echymosis of anterior neck pain on coughing and breathing         Chest wall pain     Rest wall pain after falling making it difficult to breathe and cough patient reassured follow-up with me at next visit try moist heat now                 Reason for visit is   Chief Complaint   Patient presents with    Follow-up     From ED fell.         Encounter provider Vivek Preston DO      Recent Visits  No visits were found meeting these conditions.  Showing recent visits within past 7 days and meeting all other requirements  Today's Visits  Date Type Provider Dept   04/29/24 Telemedicine Vivek Preston DO [facility]   Showing today's visits and meeting all other requirements  Future Appointments  No visits were found meeting these conditions.  Showing future appointments within next 150 days and meeting all other requirements       The patient was identified by name and date of birth. Lona Cedeno was informed that this is a telemedicine visit and that the visit is being conducted through the Epic Embedded platform. She  agrees to proceed..  My office door was closed. No one else was in the room.  She acknowledged consent and understanding of privacy and security of the video platform. The patient has agreed to participate and understands they can discontinue the visit at any time.    Patient is aware this is a billable service.     Subjective  Lona Cedeon is a 78 y.o. female  .      Follow-up visit from emergency room and fall injury         Past Medical History:   Diagnosis Date    Anxiety     Closed fracture of coccyx (HCC) 2023    Diabetes mellitus (HCC)     GERD (gastroesophageal reflux disease)     Hyperlipidemia     Hypertension     IBS (irritable bowel syndrome)     Shoulder fracture        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;  Surgeon: Cristian Child MD;  Location: [facility];  Service: Urology    EVACUATION OF HEMATOMA Right 2024    Procedure: EVACUATION/ DRAINAGE CHEST WALL  HEMATOMA;  Surgeon: Seth Hoyos MD;  Location: [facility];  Service: Vascular    FL RETROGRADE PYELOGRAM  2023    HERNIA REPAIR      umbilicus w/ mesh    MI BYPASS W/VEIN AXILLARY-FEMORAL Right 2024    Procedure: BYPASS AXILLO-FEMORAL, RIGHT WITH 8MM RINGED PTFE GRAFT;  Surgeon: Seth Hoyos MD;  Location: [facility];  Service: Vascular    SPLIT THICKNESS SKIN GRAFT Right 2024    Procedure: SKIN GRAFT SPLIT THICKNESS (STSG)  EXTREMITY, Wound vac dressing change;  Surgeon: Rigo Yeboah DPM;  Location: [facility];  Service: Podiatry    WOUND DEBRIDEMENT Right 2/4/2024    Procedure: RIGHT WOUND AND ACHILLES DEBRIDEMENT FOOT/TOE (WASH OUT); PARTIAL AMPUTATION DISTAL 2ND TOE;  Surgeon: Aaron Montero DPM;  Location: [facility];  Service: Podiatry    WOUND DEBRIDEMENT Right 2/14/2024    Procedure: DEBRIDEMENT RIGHT GROIN  WOUND AND WASHOUT, APPLICATION OF WOUND VAC;  Surgeon: Suly Muro DO;  Location: [facility];  Service: Vascular    WOUND DEBRIDEMENT Right 2/19/2024    Procedure: DEBRIDEMENT LOWER EXTREMITY, washout;  Surgeon: Suly Muro DO;  Location: [facility];  Service: Vascular       Current Outpatient Medications   Medication Sig Dispense Refill    acetaminophen (TYLENOL) 325 mg tablet Take 3 tablets (975 mg total) by mouth every 8 (eight) hours      acetaminophen-codeine (TYLENOL with CODEINE #3) 300-30 MG per tablet Take 1 tablet by mouth every 4 (four) hours as needed for moderate pain 30 tablet 0    albuterol (Ventolin HFA) 90 mcg/act inhaler Inhale 2 puffs every 6 (six) hours as needed for wheezing 18 g 3    Apoaequorin 20 MG CAPS Take 20 mg by mouth in the morning 90 capsule 0    Aspirin (ASPIR-81 PO) take one by mouth daily      atorvastatin (LIPITOR) 10 mg tablet Take 1 tablet (10 mg total) by mouth daily with dinner 90 tablet 0    Blood Glucose Monitoring Suppl (OneTouch Verio) w/Device KIT Use 2 (two) times a day 1 kit 0    buPROPion (WELLBUTRIN XL) 300 mg 24 hr tablet Take 1 tablet (300 mg total) by mouth daily 90 tablet 0    clonazePAM (KlonoPIN) 1 mg tablet Take 1 tablet (1 mg total) by mouth every evening 30 tablet 0    cyanocobalamin (VITAMIN B-12) 1000 MCG tablet Take 1 tablet (1,000 mcg total) by mouth daily 90 tablet 0    Fluticasone-Salmeterol (Advair Diskus) 250-50 mcg/dose inhaler Inhale 1 puff 2 (two) times a day Rinse mouth after use. 60 blister 2    Lancets (OneTouch Delica Plus [id number]) MISC TEST TWO TIMES A  each 5    levofloxacin (LEVAQUIN) 500 mg tablet       lisinopril  (ZESTRIL) 5 mg tablet Take 1 tablet (5 mg total) by mouth daily 90 tablet 0    metFORMIN (GLUCOPHAGE) 1000 MG tablet Take 1 tablet (1,000 mg total) by mouth 2 (two) times a day with meals 180 tablet 0    Misc. Devices (Carex Coccyx Cushion) MISC Use in the morning 1 each 0    naloxone (NARCAN) 4 mg/0.1 mL nasal spray Administer 1 spray into a nostril. If no response after 2-3 minutes, give another dose in the other nostril using a new spray. 1 each 1    nicotine (NICODERM CQ) 14 mg/24hr TD 24 hr patch Place 1 patch on the skin over 24 hours daily 28 patch 0    Omega-3 Fatty Acids (FISH OIL PO) 1 cap oral daily      pantoprazole (PROTONIX) 40 mg tablet Take 1 tablet (40 mg total) by mouth daily 90 tablet 0    polyethylene glycol (MIRALAX) 17 g packet Take 17 g by mouth daily      pregabalin (LYRICA) 200 MG capsule TAKE ONE CAPSULE BY MOUTH THREE TIMES A DAY 90 capsule 0    rivaroxaban (Xarelto) 2.5 mg tablet Take 1 tablet (2.5 mg total) by mouth 2 (two) times a day 60 tablet 0     No current facility-administered medications for this visit.        Allergies   Allergen Reactions    Paroxetine Other (See Comments)     Became suicidal    Adhesive [Medical Tape] Itching and Blisters    Carafate [Sucralfate] Other (See Comments)     Mouth sores, tongue sore    Sulfa Antibiotics Hives and Rash    Sulfamethoxazole-Trimethoprim Hives       Review of Systems   Constitutional:  Negative for chills, fatigue and fever.   HENT:  Negative for congestion, nosebleeds, rhinorrhea, sinus pressure and sore throat.    Eyes:  Negative for discharge and redness.   Respiratory:  Positive for cough and shortness of breath.    Cardiovascular:  Negative for chest pain, palpitations and leg swelling.   Gastrointestinal:  Negative for abdominal pain, blood in stool and nausea.   Endocrine: Negative for cold intolerance, heat intolerance and polyuria.   Genitourinary:  Negative for dysuria and frequency.   Musculoskeletal:  Negative for  arthralgias, back pain and myalgias.   Skin:  Negative for rash.   Neurological:  Negative for dizziness, weakness and headaches.   Hematological:  Negative for adenopathy.   Psychiatric/Behavioral:  Negative for behavioral problems and sleep disturbance. The patient is not nervous/anxious.        Video Exam    There were no vitals filed for this visit.    Physical Exam  Vitals and nursing note reviewed.   Constitutional:       General: She is not in acute distress.     Appearance: She is well-developed.   HENT:      Head: Normocephalic and atraumatic.      Right Ear: External ear normal.      Left Ear: External ear normal.      Nose: Nose normal.      Mouth/Throat:      Mouth: Mucous membranes are moist.      Pharynx: Oropharynx is clear. No oropharyngeal exudate.   Eyes:      General: No scleral icterus.        Right eye: No discharge.         Left eye: No discharge.      Conjunctiva/sclera: Conjunctivae normal.      Pupils: Pupils are equal, round, and reactive to light.   Neck:      Thyroid: No thyromegaly.      Vascular: No JVD.   Cardiovascular:      Rate and Rhythm: Normal rate and regular rhythm.      Heart sounds: Normal heart sounds. No murmur heard.  Pulmonary:      Effort: Pulmonary effort is normal.      Breath sounds: No wheezing or rales.   Chest:      Chest wall: No tenderness.   Abdominal:      General: Bowel sounds are normal. There is no distension.      Palpations: Abdomen is soft. There is no mass.      Tenderness: There is no abdominal tenderness.   Musculoskeletal:         General: No tenderness or deformity. Normal range of motion.      Cervical back: Normal range of motion.   Lymphadenopathy:      Cervical: No cervical adenopathy.   Skin:     General: Skin is warm and dry.      Findings: No rash.   Neurological:      General: No focal deficit present.      Mental Status: She is alert and oriented to person, place, and time.      Cranial Nerves: No cranial nerve deficit.      Coordination:  Coordination normal.      Deep Tendon Reflexes: Reflexes are normal and symmetric. Reflexes normal.   Psychiatric:         Mood and Affect: Mood normal.         Behavior: Behavior normal.         Thought Content: Thought content normal.         Judgment: Judgment normal.          Visit Time  Total Visit Duration: 30

## 2024-05-01 ENCOUNTER — HOME CARE VISIT (OUTPATIENT)
Dept: HOME HEALTH SERVICES | Facility: HOME HEALTHCARE | Age: 79
End: 2024-05-01
Payer: COMMERCIAL

## 2024-05-01 PROCEDURE — G0299 HHS/HOSPICE OF RN EA 15 MIN: HCPCS

## 2024-05-02 ENCOUNTER — HOME CARE VISIT (OUTPATIENT)
Dept: HOME HEALTH SERVICES | Facility: HOME HEALTHCARE | Age: 79
End: 2024-05-02
Payer: COMMERCIAL

## 2024-05-02 ENCOUNTER — OFFICE VISIT (OUTPATIENT)
Dept: WOUND CARE | Facility: CLINIC | Age: 79
End: 2024-05-02
Payer: COMMERCIAL

## 2024-05-02 VITALS
RESPIRATION RATE: 18 BRPM | DIASTOLIC BLOOD PRESSURE: 62 MMHG | SYSTOLIC BLOOD PRESSURE: 102 MMHG | TEMPERATURE: 97.4 F | HEART RATE: 84 BPM

## 2024-05-02 DIAGNOSIS — L97.302 DIABETIC ULCER OF ANKLE WITH FAT LAYER EXPOSED (HCC): ICD-10-CM

## 2024-05-02 DIAGNOSIS — S81.801D OPEN WOUND OF RIGHT LOWER EXTREMITY, SUBSEQUENT ENCOUNTER: Primary | ICD-10-CM

## 2024-05-02 DIAGNOSIS — E11.622 DIABETIC ULCER OF ANKLE WITH FAT LAYER EXPOSED (HCC): ICD-10-CM

## 2024-05-02 PROCEDURE — 11042 DBRDMT SUBQ TIS 1ST 20SQCM/<: CPT | Performed by: STUDENT IN AN ORGANIZED HEALTH CARE EDUCATION/TRAINING PROGRAM

## 2024-05-02 PROCEDURE — 97597 DBRDMT OPN WND 1ST 20 CM/<: CPT | Performed by: STUDENT IN AN ORGANIZED HEALTH CARE EDUCATION/TRAINING PROGRAM

## 2024-05-02 RX ORDER — LIDOCAINE 40 MG/G
CREAM TOPICAL ONCE
Status: COMPLETED | OUTPATIENT
Start: 2024-05-02 | End: 2024-05-02

## 2024-05-02 RX ADMIN — LIDOCAINE 1 APPLICATION: 40 CREAM TOPICAL at 08:51

## 2024-05-02 NOTE — PATIENT INSTRUCTIONS
No orders of the defined types were placed in this encounter.    Orders Placed This Encounter   Procedures    Wound cleansing and dressings Diabetic Ulcer Posterior;Right Ankle     · Wound cleansing and dressings Diabetic Ulcer Posterior;Right Ankle      Right posterior ankle wound      Wash your hands with soap and water.  Remove old dressing, discard into plastic bag and place in trash.  Cleanse the wound with unscented mild soap (unscented dove) and water prior to applying a clean dressing. Do not use tissue or cotton balls. Do not scrub the wound. Pat dry using gauze.   Shower no      Apply Polymem Ag (gray foam- blank side to wound and pattern faces outward) to the ankle wound. Cover with ABD Pad   Secure with mitch and tape.  Apply bulky dressing. Pad the bony prominences and lateral foot with ABD pad, 2 rolls cast padding, kerlex, coban. Do not wrap coban all the way up the leg.      Apply Spandagrip F to right lower leg      Change dressing EVERY OTHER DAY with [facility]          Elastic Tubular Stocking Spandagrip F - RLE   Tubular elastic bandage: Apply from base of toes to behind the knee. Apply in AM, may remove for sleep.     Standing Status:   Future     Standing Expiration Date:   5/9/2024

## 2024-05-02 NOTE — PROGRESS NOTES
Patient ID: Lona Cedeno is a 78 y.o. female Date of Birth 1945     Diagnosis:  1. Open wound of right lower extremity, subsequent encounter  -     Wound cleansing and dressings Diabetic Ulcer Posterior;Right Ankle; Future  -     lidocaine (LMX) 4 % cream    2. 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)  -     Wound cleansing and dressings Diabetic Ulcer Posterior;Right Ankle; Future  -     lidocaine (LMX) 4 % cream       Diagnosis ICD-10-CM Associated Orders   1. Open wound of right lower extremity, subsequent encounter  S81.801D Wound cleansing and dressings Diabetic Ulcer Posterior;Right Ankle     lidocaine (LMX) 4 % cream      2. 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)  E11.622 Wound cleansing and dressings Diabetic Ulcer Posterior;Right Ankle    L97.302 lidocaine (LMX) 4 % cream         -I recommend close monitoring with local wound care as detailed below  -Patient will benefit from skin graft substitute, insurance approval send recommending new ABIs postintervention. Order for arterial duplex in.   -Discussed importance of strict offloading to the wound area  -Discussed importance of glycemic control proper protein intake  -Patient was educated on clinical signs of wound infection if present she is to call my office immediately  -Return in 1 week for follow-up    Chief Complaint   Patient presents with    Follow Up Wound Care Visit     Right lower extremity           Subjective:   78-year-old female with past medical history as detailed below presents with daughter for continued evaluation of right lower extremity ulcerations.  She has no other complaints at this time.        The following portions of the patient's history were reviewed and updated as appropriate:   Patient Active Problem List   Diagnosis    Type 2 diabetes mellitus with diabetic polyneuropathy (HCC)    Essential hypertension    Anxiety and depression    Medicare annual wellness visit, subsequent    GERD without  esophagitis    Urinary incontinence    Degenerative lumbar disc    Lactic acidosis    Hypomagnesemia    Bladder neoplasm    Left renal mass    PAD (peripheral artery disease) (HCC)    Abnormal CT of the chest    COPD, severity to be determined (HCC)    Tobacco use disorder    Age-related osteoporosis without current pathological fracture    Ankle ulcer, right, with fat layer exposed (HCC)    Diabetic foot ulcer with osteomyelitis (HCC)    Preoperative cardiovascular examination    Constipation    Hematoma    Anemia    At risk for venous thromboembolism (VTE)    At high risk for skin breakdown    Wound of skin    Impaired mobility and activities of daily living    Leukocytosis    Surgical site infection    Sepsis without acute organ dysfunction (HCC)    Other dietary vitamin B12 deficiency anemia    Sacral wound    Acute respiratory failure with hypoxia (HCC)    Malignant neoplasm of renal pelvis, unspecified laterality (HCC)    Contusion of head, sequela    Chest wall pain     Past Medical History:   Diagnosis Date    Anxiety     Closed fracture of coccyx ([facility]) 2023    Diabetes mellitus (HCC)     GERD (gastroesophageal reflux disease)     Hyperlipidemia     Hypertension     IBS (irritable bowel syndrome)     Shoulder fracture      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;  Surgeon: Cristian Child MD;  Location: [facility];  Service: Urology    EVACUATION OF HEMATOMA Right 2024    Procedure: EVACUATION/ DRAINAGE CHEST WALL  HEMATOMA;   Surgeon: Seth Hoyos MD;  Location: [facility];  Service: Vascular    FL RETROGRADE PYELOGRAM  9/18/2023    HERNIA REPAIR      umbilicus w/ mesh    MD BYPASS W/VEIN AXILLARY-FEMORAL Right 1/31/2024    Procedure: BYPASS AXILLO-FEMORAL, RIGHT WITH 8MM RINGED PTFE GRAFT;  Surgeon: Seth Hoyos MD;  [facility];  Service: Vascular    SPLIT THICKNESS SKIN GRAFT Right 2/28/2024    Procedure: SKIN GRAFT SPLIT THICKNESS (STSG)  EXTREMITY, Wound vac dressing change;  Surgeon: Rigo Yeboah DPM;  [facility];  Service: Podiatry    WOUND DEBRIDEMENT Right 2/4/2024    Procedure: RIGHT WOUND AND ACHILLES DEBRIDEMENT FOOT/TOE (WASH OUT); PARTIAL AMPUTATION DISTAL 2ND TOE;  Surgeon: Aaron Montero DPM;  [facility];  Service: Podiatry    WOUND DEBRIDEMENT Right 2/14/2024    Procedure: DEBRIDEMENT RIGHT GROIN  WOUND AND WASHOUT, APPLICATION OF WOUND VAC;  Surgeon: Suly Muro DO;  [facility];  Service: Vascular    WOUND DEBRIDEMENT Right 2/19/2024    Procedure: DEBRIDEMENT LOWER EXTREMITY, washout;  Surgeon: Suly Muro DO;  [facility];  Service: Vascular     Social History     Socioeconomic History    Marital status: /Civil Union     Spouse name: None    Number of children: None    Years of education: None    Highest education level: None   Occupational History    None   Tobacco Use    Smoking status: Former     Current packs/day: 1.00     Average packs/day: 1 pack/day for 63.3 years (63.3 ttl pk-yrs)     Types: Cigarettes     Start date: 1961    Smokeless tobacco: Never    Tobacco comments:     Quit January 31, 2024   Vaping Use    Vaping status: Never Used   Substance and Sexual Activity    Alcohol use: Not Currently    Drug use: Never    Sexual activity: Not Currently   Other Topics Concern    None   Social History Narrative    None     Social Determinants of Health     Financial Resource Strain: Low Risk  (2/9/2023)    Overall  Financial Resource Strain (CARDIA)     Difficulty of Paying Living Expenses: Not hard at all   Food Insecurity: No Food Insecurity (4/18/2024)    Hunger Vital Sign     Worried About Running Out of Food in the Last Year: Never true     Ran Out of Food in the Last Year: Never true   Transportation Needs: No Transportation Needs (4/18/2024)    PRAPARE - Transportation     Lack of Transportation (Medical): No     Lack of Transportation (Non-Medical): No   Physical Activity: Not on file   Stress: Not on file   Social Connections: Not on file   Intimate Partner Violence: Not on file   Housing Stability: Low Risk  (4/18/2024)    Housing Stability Vital Sign     Unable to Pay for Housing in the Last Year: No     Number of Places Lived in the Last Year: 1     Unstable Housing in the Last Year: No        Current Outpatient Medications:     acetaminophen (TYLENOL) 325 mg tablet, Take 3 tablets (975 mg total) by mouth every 8 (eight) hours, Disp: , Rfl:     acetaminophen-codeine (TYLENOL with CODEINE #3) 300-30 MG per tablet, Take 1 tablet by mouth every 4 (four) hours as needed for moderate pain, Disp: 30 tablet, Rfl: 0    albuterol (Ventolin HFA) 90 mcg/act inhaler, Inhale 2 puffs every 6 (six) hours as needed for wheezing, Disp: 18 g, Rfl: 3    Apoaequorin 20 MG CAPS, Take 20 mg by mouth in the morning, Disp: 90 capsule, Rfl: 0    Aspirin (ASPIR-81 PO), take one by mouth daily, Disp: , Rfl:     atorvastatin (LIPITOR) 10 mg tablet, Take 1 tablet (10 mg total) by mouth daily with dinner, Disp: 90 tablet, Rfl: 0    Blood Glucose Monitoring Suppl (OneTouch Verio) w/Device KIT, Use 2 (two) times a day, Disp: 1 kit, Rfl: 0    buPROPion (WELLBUTRIN XL) 300 mg 24 hr tablet, Take 1 tablet (300 mg total) by mouth daily, Disp: 90 tablet, Rfl: 0    clonazePAM (KlonoPIN) 1 mg tablet, Take 1 tablet (1 mg total) by mouth every evening, Disp: 30 tablet, Rfl: 0    cyanocobalamin (VITAMIN B-12) 1000 MCG tablet, Take 1 tablet (1,000 mcg total)  by mouth daily, Disp: 90 tablet, Rfl: 0    Fluticasone-Salmeterol (Advair Diskus) 250-50 mcg/dose inhaler, Inhale 1 puff 2 (two) times a day Rinse mouth after use., Disp: 60 blister, Rfl: 2    Lancets (OneTouch Delica Plus [id number]) MISC, TEST TWO TIMES A DAY, Disp: 100 each, Rfl: 5    levofloxacin (LEVAQUIN) 500 mg tablet, , Disp: , Rfl:     lisinopril (ZESTRIL) 5 mg tablet, Take 1 tablet (5 mg total) by mouth daily, Disp: 90 tablet, Rfl: 0    metFORMIN (GLUCOPHAGE) 1000 MG tablet, Take 1 tablet (1,000 mg total) by mouth 2 (two) times a day with meals, Disp: 180 tablet, Rfl: 0    Misc. Devices (Carex Coccyx Cushion) MISC, Use in the morning, Disp: 1 each, Rfl: 0    naloxone (NARCAN) 4 mg/0.1 mL nasal spray, Administer 1 spray into a nostril. If no response after 2-3 minutes, give another dose in the other nostril using a new spray., Disp: 1 each, Rfl: 1    nicotine (NICODERM CQ) 14 mg/24hr TD 24 hr patch, Place 1 patch on the skin over 24 hours daily, Disp: 28 patch, Rfl: 0    Omega-3 Fatty Acids (FISH OIL PO), 1 cap oral daily, Disp: , Rfl:     pantoprazole (PROTONIX) 40 mg tablet, Take 1 tablet (40 mg total) by mouth daily, Disp: 90 tablet, Rfl: 0    polyethylene glycol (MIRALAX) 17 g packet, Take 17 g by mouth daily, Disp: , Rfl:     pregabalin (LYRICA) 200 MG capsule, TAKE ONE CAPSULE BY MOUTH THREE TIMES A DAY, Disp: 90 capsule, Rfl: 0    rivaroxaban (Xarelto) 2.5 mg tablet, Take 1 tablet (2.5 mg total) by mouth 2 (two) times a day, Disp: 60 tablet, Rfl: 0  No current facility-administered medications for this visit.  Family History   Problem Relation Age of Onset    COPD Mother     Emphysema Mother     COPD Father     Emphysema Father       Review of Systems   All other systems reviewed and are negative.    Allergies:  Paroxetine, Adhesive [medical tape], Carafate [sucralfate], Sulfa antibiotics, and Sulfamethoxazole-trimethoprim      Objective:  /62   Pulse 84   Temp (!) 97.4 °F (36.3 °C) (Temporal)    Resp 18     Physical Exam  Vitals reviewed.   Feet:      Right foot:      Skin integrity: Ulcer present.      Comments:  Right posterior ankle ulcer probable to subcutaneous tissue, AT not exposed. Minimal Hypergranulation tissue noted. postdebridement wound was bleeding and 100% granular.     Superficial ulcer noted to the level of dermal tissue on the right leg where the donor site with mild sloughing today.              Wound 02/14/24 Groin Right (Active)       Wound 02/28/24 Diabetic Ulcer Ankle Posterior;Right (Active)   Wound Image   05/02/24 0902   Wound Description Granulation tissue;Pink 05/02/24 0844   Ani-wound Assessment Scar Tissue;Pink 05/02/24 0844   Wound Length (cm) 4.6 cm 05/02/24 0844   Wound Width (cm) 1.9 cm 05/02/24 0844   Wound Depth (cm) 0.1 cm 05/02/24 0844   Wound Surface Area (cm^2) 8.74 cm^2 05/02/24 0844   Wound Volume (cm^3) 0.874 cm^3 05/02/24 0844   Calculated Wound Volume (cm^3) 0.87 cm^3 05/02/24 0844   Change in Wound Size % 81.88 05/02/24 0844   Drainage Amount Small 05/02/24 0844   Drainage Description Serosanguineous;Yellow 05/02/24 0844   Non-staged Wound Description Full thickness 05/02/24 0844   Treatments Cleansed 05/02/24 0844   Wound packed? No 05/02/24 0844   Dressing Changed Changed 05/02/24 0844   Patient Tolerance Tolerated well 05/02/24 0844   Dressing Status Intact;Old drainage 05/02/24 0844       Wound 03/15/24 Donor Site Leg Medial;Right (Active)   Wound Image   05/02/24 0840   Wound Description Pink;Granulation tissue 05/02/24 0846   Ani-wound Assessment Dry 05/02/24 0846   Wound Length (cm) 2.5 cm 05/02/24 0846   Wound Width (cm) 0.5 cm 05/02/24 0846   Wound Depth (cm) 0.1 cm 05/02/24 0846   Wound Surface Area (cm^2) 1.25 cm^2 05/02/24 0846   Wound Volume (cm^3) 0.125 cm^3 05/02/24 0846   Calculated Wound Volume (cm^3) 0.13 cm^3 05/02/24 0846   Change in Wound Size % 94.58 05/02/24 0846   Drainage Amount Scant 05/02/24 0846   Drainage Description  "Serosanguineous 05/02/24 0846   Non-staged Wound Description Full thickness 05/02/24 0846   Treatments Cleansed 05/02/24 0846   Wound packed? No 05/02/24 0846   Dressing Changed Changed 05/02/24 0846   Patient Tolerance Tolerated well 05/02/24 0846   Dressing Status Intact 05/02/24 0846                         Debridement   Wound 02/28/24 Diabetic Ulcer Ankle Posterior;Right    Universal Protocol:  Consent: Verbal consent obtained.  Risks and benefits: risks, benefits and alternatives were discussed  Consent given by: patient  Time out: Immediately prior to procedure a \"time out\" was called to verify the correct patient, procedure, equipment, support staff and site/side marked as required.  Patient understanding: patient states understanding of the procedure being performed  Patient identity confirmed: verbally with patient    Debridement Details  Performed by: physician  Debridement type: surgical  Level of debridement: subcutaneous tissue  Pain control: lidocaine 4%      Post-debridement measurements  Length (cm): 4.6  Width (cm): 1.9  Depth (cm): 0.2  Percent debrided: 100%  Surface Area (cm^2): 8.74  Area Debrided (cm^2): 8.74  Volume (cm^3): 1.75    Tissue and other material debrided: subcutaneous tissue  Devitalized tissue debrided: biofilm, callus, exudate, fibrin and slough  Instrument(s) utilized: blade, curette and forceps  Bleeding: small  Hemostasis obtained with: pressure  Procedural pain (0-10): insensate  Post-procedural pain: insensate   Response to treatment: procedure was tolerated well         Results from last 6 Months   Lab Units 02/14/24  1539   WOUND CULTURE  1+ Growth of Klebsiella pneumoniae*  1+ Growth of Proteus vulgaris group*  Few Colonies of         Wound Instructions:  Orders Placed This Encounter   Procedures    Wound cleansing and dressings Diabetic Ulcer Posterior;Right Ankle     · Wound cleansing and dressings Diabetic Ulcer Posterior;Right Ankle      Right posterior ankle wound " "     Wash your hands with soap and water.  Remove old dressing, discard into plastic bag and place in trash.  Cleanse the wound with unscented mild soap (unscented dove) and water prior to applying a clean dressing. Do not use tissue or cotton balls. Do not scrub the wound. Pat dry using gauze.   Shower no      Apply Polymem Ag (gray foam- blank side to wound and pattern faces outward) to the ankle wound. Cover with ABD Pad   Secure with mitch and tape.  Apply bulky dressing. Pad the bony prominences and lateral foot with ABD pad, 2 rolls cast padding, kerlex, coban. Do not wrap coban all the way up the leg.      Apply Spandagrip F to right lower leg      [facility]          Elastic Tubular Stocking Spandagrip F - RLE   Tubular elastic bandage: Apply from base of toes to behind the knee. Apply in AM, may remove for sleep.     Standing Status:   Future     Standing Expiration Date:   5/9/2024         Jennifer Gann DPM      Portions of the record may have been created with voice recognition software. Occasional wrong word or \"sound a like\" substitutions may have occurred due to the inherent limitations of voice recognition software. Read the chart carefully and recognize, using context, where substitutions have occurred.      "

## 2024-05-02 NOTE — PROGRESS NOTES
"Debridement   Wound 03/15/24 Donor Site Leg Medial;Right    Universal Protocol:  Consent: Verbal consent obtained.  Risks and benefits: risks, benefits and alternatives were discussed  Consent given by: patient  Time out: Immediately prior to procedure a \"time out\" was called to verify the correct patient, procedure, equipment, support staff and site/side marked as required.  Patient understanding: patient states understanding of the procedure being performed  Patient identity confirmed: verbally with patient    Debridement Details  Performed by: physician  Debridement type: selective  Pain control: lidocaine 4%      Post-debridement measurements  Length (cm): 2.5  Width (cm): 0.5  Depth (cm): 0.1  Percent debrided: 100%  Surface Area (cm^2): 1.25  Area Debrided (cm^2): 1.25  Volume (cm^3): 0.13    Devitalized tissue debrided: biofilm, fibrin and slough  Instrument(s) utilized: curette  Bleeding: small  Hemostasis obtained with: pressure  Response to treatment: procedure was tolerated well        "

## 2024-05-03 ENCOUNTER — TELEPHONE (OUTPATIENT)
Dept: VASCULAR SURGERY | Facility: CLINIC | Age: 79
End: 2024-05-03

## 2024-05-03 NOTE — TELEPHONE ENCOUNTER
"Patient is s/p Rt axillo femoral bypass on 1/31/24 with Dr. Hoyos and an I & D of hematoma/seroma of right axillary dissection operative site on 2/4/24 with Dr. Hoyos.  Patient was scheduled for post op visit on 3/22/24 and was a \"No Show\".      Contacted patient and discuss need and importance for following up with our office.  She state that she has been seeing Wound Care for her Wounds.  She stated that she has and LEAD scheduled for 5/13/24.  She was agreeable to schedule an appointment, but preferred that we contact her daughter Ariela - [phone] - so the appointment can be made at a time when she is available as patient is in a wheelchair and cannot put pressure on her foot.    Will send a message to Vascular Clerical to contact patient's daughter Ariela to schedule a post op appointment for patient.  "

## 2024-05-04 ENCOUNTER — HOME CARE VISIT (OUTPATIENT)
Dept: HOME HEALTH SERVICES | Facility: HOME HEALTHCARE | Age: 79
End: 2024-05-04
Payer: COMMERCIAL

## 2024-05-04 VITALS
OXYGEN SATURATION: 94 % | RESPIRATION RATE: 18 BRPM | DIASTOLIC BLOOD PRESSURE: 72 MMHG | SYSTOLIC BLOOD PRESSURE: 138 MMHG | TEMPERATURE: 96.5 F

## 2024-05-04 PROCEDURE — G0299 HHS/HOSPICE OF RN EA 15 MIN: HCPCS

## 2024-05-06 ENCOUNTER — HOME CARE VISIT (OUTPATIENT)
Dept: HOME HEALTH SERVICES | Facility: HOME HEALTHCARE | Age: 79
End: 2024-05-06
Payer: COMMERCIAL

## 2024-05-06 VITALS
SYSTOLIC BLOOD PRESSURE: 122 MMHG | TEMPERATURE: 98.3 F | DIASTOLIC BLOOD PRESSURE: 60 MMHG | HEART RATE: 80 BPM | RESPIRATION RATE: 20 BRPM | OXYGEN SATURATION: 92 %

## 2024-05-06 PROCEDURE — G0299 HHS/HOSPICE OF RN EA 15 MIN: HCPCS

## 2024-05-06 NOTE — TELEPHONE ENCOUNTER
Contacted patient's daughter Ariela to discuss recommendations from Dr. Hoyos and left a message to return call to schedule an appointment for patient.

## 2024-05-06 NOTE — TELEPHONE ENCOUNTER
Please see Communication on Home Care Visit Dated 5/1/24 -     Jannette Comer, RN   to Seth Hoyos MD       5/2/24  7:36 AM  Hi I'm the home care nurse for Lona Cedeno. Her right groin wound had been closed for a few weeks but now opened up again superficially. Please see media section for photo. It measures [id number] and is beefy red with scant bloody drainage noted. I resumed the previous wound care of silver alginate and dry dressing every other day when we come for her other wound care.  Please let me know if you want anything else done or if I should be contacting anyone else. She sees podiatry weekly at the wound center.  Thanks,  Jannette SIEGEL nurse  May 3, 2024           5/3/24  2:05 AM  System-Generated Message routed this conversation to Jannette Comer RN  May 6, 2024  Seth Hoyos MD   to PAULINA Daley RN Lisa Fitzell, RN Shannon Fedrizzi, RN       5/6/24  8:43 AM  She should be seen by one of our team members and undergo a CTA to evaluate for infected prosthetic.  If she experiences any bleeding she should urgently report to the ED.  She needs to be seen within the next week.      Team, lets set her up for a CTA chest/abdomen/pelvis as well as a CBC and BMP and see her in the office this week if we can.  I understand this will need to be STAT and am happy to sign any orders to make this possible.      Thank you, please reach out with questions.    Jhony

## 2024-05-07 VITALS
DIASTOLIC BLOOD PRESSURE: 64 MMHG | SYSTOLIC BLOOD PRESSURE: 124 MMHG | TEMPERATURE: 98 F | OXYGEN SATURATION: 97 % | RESPIRATION RATE: 16 BRPM | HEART RATE: 64 BPM

## 2024-05-08 ENCOUNTER — HOME CARE VISIT (OUTPATIENT)
Dept: HOME HEALTH SERVICES | Facility: HOME HEALTHCARE | Age: 79
End: 2024-05-08
Payer: COMMERCIAL

## 2024-05-08 ENCOUNTER — APPOINTMENT (OUTPATIENT)
Dept: LAB | Facility: CLINIC | Age: 79
End: 2024-05-08
Payer: COMMERCIAL

## 2024-05-08 VITALS
TEMPERATURE: 97.3 F | RESPIRATION RATE: 20 BRPM | DIASTOLIC BLOOD PRESSURE: 62 MMHG | HEART RATE: 84 BPM | SYSTOLIC BLOOD PRESSURE: 108 MMHG | OXYGEN SATURATION: 92 %

## 2024-05-08 PROCEDURE — G0299 HHS/HOSPICE OF RN EA 15 MIN: HCPCS

## 2024-05-09 ENCOUNTER — OFFICE VISIT (OUTPATIENT)
Dept: WOUND CARE | Facility: CLINIC | Age: 79
End: 2024-05-09
Payer: COMMERCIAL

## 2024-05-09 VITALS
RESPIRATION RATE: 20 BRPM | HEART RATE: 80 BPM | DIASTOLIC BLOOD PRESSURE: 58 MMHG | TEMPERATURE: 96.6 F | SYSTOLIC BLOOD PRESSURE: 136 MMHG

## 2024-05-09 DIAGNOSIS — S81.801D OPEN WOUND OF RIGHT LOWER EXTREMITY, SUBSEQUENT ENCOUNTER: ICD-10-CM

## 2024-05-09 DIAGNOSIS — L97.302 DIABETIC ULCER OF ANKLE WITH FAT LAYER EXPOSED (HCC): Primary | ICD-10-CM

## 2024-05-09 DIAGNOSIS — E11.622 DIABETIC ULCER OF ANKLE WITH FAT LAYER EXPOSED (HCC): Primary | ICD-10-CM

## 2024-05-09 PROCEDURE — 97597 DBRDMT OPN WND 1ST 20 CM/<: CPT | Performed by: STUDENT IN AN ORGANIZED HEALTH CARE EDUCATION/TRAINING PROGRAM

## 2024-05-09 RX ORDER — LIDOCAINE 40 MG/G
CREAM TOPICAL ONCE
Status: COMPLETED | OUTPATIENT
Start: 2024-05-09 | End: 2024-05-09

## 2024-05-09 RX ADMIN — LIDOCAINE: 40 CREAM TOPICAL at 08:55

## 2024-05-09 NOTE — PROGRESS NOTES
Wound Procedure Treatment Donor Site Medial;Right Leg    Performed by: Lona Jarvis RN  Authorized by: Jennifer Gann DPM  Associated wounds:   Wound 03/15/24 Donor Site Leg Medial;Right  Wound cleansed with:  NSS  Applied to periwound:  Ammonium lactate  Applied primary dressing:  Non adherent contact layer  Applied secondary dressing:  Gauze  Wound secured with:  Alicia and Tape

## 2024-05-09 NOTE — PATIENT INSTRUCTIONS
Orders Placed This Encounter   Procedures    Wound cleansing and dressings Donor Site Medial;Right Leg     Right lower leg donor site     Wash your hands with soap and water.  Remove old dressing, discard into plastic bag and place in trash.  Cleanse the wound with unscented soap and water prior to applying a clean dressing. Do not use tissue or cotton balls. Do not scrub the wound. Pat dry using gauze.    Apply Adaptic to the right lower leg  wound.  Cover with gauze.  Secure with mitch and tape   Change dressing every other day with [facility]     Standing Status:   Future     Standing Expiration Date:   5/16/2024    Wound cleansing and dressings Diabetic Ulcer Posterior;Right Ankle     Right posterior ankle wound     Wash your hands with soap and water.  Remove old dressing, discard into plastic bag and place in trash.  Cleanse the wound with unscented mild soap (unscented dove) and water prior to applying a clean dressing. Do not use tissue or cotton balls. Do not scrub the wound. Pat dry using gauze.  Shower no     Apply Polymem Ag (gray foam- blank side to wound and pattern faces outward) to the ankle wound. Cover with ABD Pad  Secure with mitch and tape.  Apply bulky dressing. Pad the bony prominences and lateral foot with ABD pad, 2 rolls cast padding, kerlex, coban. Do not wrap coban all the way up the leg.     Apply Spandagrip F to right lower leg     Change dressing EVERY OTHER DAY with [facility]          Elastic Tubular Stocking Spandagrip F - RLE  Tubular elastic bandage: Apply from base of toes to behind the knee. Apply in AM, may remove for sleep.     Standing Status:   Future     Standing Expiration Date:   5/16/2024

## 2024-05-09 NOTE — PROGRESS NOTES
Patient ID: Lona Cedeno is a 78 y.o. female Date of Birth 1945     Diagnosis:  1. 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 Open wound of right lower extremity, subsequent encounter  -     Wound cleansing and dressings Donor Site Medial;Right Leg; Future  -     Wound cleansing and dressings Diabetic Ulcer Posterior;Right Ankle; Future  -     lidocaine (LMX) 4 % cream  -     Wound Procedure Treatment Donor Site Medial;Right Leg  -     Wound Procedure Treatment Diabetic Ulcer Posterior;Right Ankle       Diagnosis ICD-10-CM Associated Orders   1. 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 Open wound of right lower extremity, subsequent encounter  S81.801D Wound cleansing and dressings Donor Site Medial;Right Leg     Wound cleansing and dressings Diabetic Ulcer Posterior;Right Ankle     lidocaine (LMX) 4 % cream     Wound Procedure Treatment Donor Site Medial;Right Leg     Wound Procedure Treatment Diabetic Ulcer Posterior;Right Ankle           -I recommend close monitoring with local wound care as detailed below  -Patient will benefit from skin graft substitute, insurance approval send recommending new ABIs postintervention. Order for arterial duplex in.   -Discussed importance of strict offloading to the wound area  -Discussed importance of glycemic control proper  protein intake  -Patient was educated on clinical signs of wound infection if present she is to call my office immediately  -Return in 1 week for follow-up    Chief Complaint   Patient presents with    Follow Up Wound Care Visit     Right lower extremity wound            Subjective:   78-year-old female with past medical history as detailed below presents for continued wound treatment to the right lower extremity.  Patient denies any other complaints today.        The following portions of the patient's history were reviewed and updated as appropriate:   Patient Active Problem List   Diagnosis    Type 2 diabetes mellitus with diabetic polyneuropathy ([facility]ly [facility])    Essential hypertension    Anxiety and depression    Medicare annual wellness visit, subsequent    GERD without esophagitis    Urinary incontinence    Degenerative lumbar disc    Lactic acidosis    Hypomagnesemia    Bladder neoplasm    Left renal mass    PAD (peripheral artery disease) ([facility]ly [facility])    Abnormal CT of the chest    COPD, severity to be determined ([facility]ly [facility])    Tobacco use disorder    Age-related osteoporosis without current pathological fracture    Ankle ulcer, right, with fat layer exposed ([facility])    Diabetic foot ulcer with osteomyelitis ([facility]ly [facility])    Preoperative cardiovascular examination    Constipation    Hematoma    Anemia    At risk for venous thromboembolism (VTE)    At high risk for skin breakdown    Wound of skin    Impaired mobility and activities of daily living    Leukocytosis    Surgical site infection    Sepsis without acute organ dysfunction ([facility])    Other dietary vitamin B12 deficiency anemia    Sacral wound    Acute respiratory failure with hypoxia ([facility])    Malignant neoplasm of renal pelvis, unspecified laterality ([facility])    Contusion of head, sequela    Chest wall pain     Past Medical History:   Diagnosis Date    Anxiety     Closed fracture of coccyx ([facility]) 05/24/2023    Diabetes mellitus ([facility])     GERD (gastroesophageal reflux disease)      Hyperlipidemia     Hypertension     IBS (irritable bowel syndrome)     Shoulder fracture      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;  Surgeon: Cristian Child MD;  Location: [facility];  Service: Urology    EVACUATION OF HEMATOMA Right 2024    Procedure: EVACUATION/ DRAINAGE CHEST WALL  HEMATOMA;  Surgeon: Seth Hoyos MD;  Location: [facility];  Service: Vascular    FL RETROGRADE PYELOGRAM  2023    HERNIA REPAIR      umbilicus w/ mesh    KY BYPASS W/VEIN AXILLARY-FEMORAL Right 2024    Procedure: BYPASS AXILLO-FEMORAL, RIGHT WITH 8MM RINGED PTFE GRAFT;  Surgeon: Seth Hoyos MD;  [facility];  Service: Vascular    SPLIT THICKNESS SKIN GRAFT Right 2024    Procedure: SKIN GRAFT SPLIT THICKNESS (STSG)  EXTREMITY, Wound vac dressing change;  Surgeon: Rigo Yeboah DPM;  [facility]: [facility];  Service: Podiatry    WOUND DEBRIDEMENT Right 2024    Procedure: RIGHT WOUND AND ACHILLES DEBRIDEMENT FOOT/TOE (WASH OUT); PARTIAL AMPUTATION DISTAL 2ND TOE;  Surgeon: Aaron Montero DPM;  [facility]: [facility];  Service: Podiatry    WOUND DEBRIDEMENT Right 2024    Procedure: DEBRIDEMENT RIGHT GROIN  WOUND AND WASHOUT, APPLICATION OF WOUND VAC;  Surgeon: Suly Muro DO;  [facility];  Service: Vascular    WOUND DEBRIDEMENT Right 2024    Procedure: DEBRIDEMENT LOWER EXTREMITY, washout;  Surgeon: Suly Muro DO;  [facility];  Service: Vascular     Social  History     Socioeconomic History    Marital status: /Civil Union     Spouse name: None    Number of children: None    Years of education: None    Highest education level: None   Occupational History    None   Tobacco Use    Smoking status: Former     Current packs/day: 1.00     Average packs/day: 1 pack/day for 63.4 years (63.4 ttl pk-yrs)     Types: Cigarettes     Start date: 1961    Smokeless tobacco: Never    Tobacco comments:     Quit January 31, 2024   Vaping Use    Vaping status: Never Used   Substance and Sexual Activity    Alcohol use: Not Currently    Drug use: Never    Sexual activity: Not Currently   Other Topics Concern    None   Social History Narrative    None     Social Determinants of Health     Financial Resource Strain: Low Risk  (2/9/2023)    Overall Financial Resource Strain (CARDIA)     Difficulty of Paying Living Expenses: Not hard at all   Food Insecurity: No Food Insecurity (4/18/2024)    Hunger Vital Sign     Worried About Running Out of Food in the Last Year: Never true     Ran Out of Food in the Last Year: Never true   Transportation Needs: No Transportation Needs (4/18/2024)    PRAPARE - Transportation     Lack of Transportation (Medical): No     Lack of Transportation (Non-Medical): No   Physical Activity: Not on file   Stress: Not on file   Social Connections: Not on file   Intimate Partner Violence: Not on file   Housing Stability: Low Risk  (4/18/2024)    Housing Stability Vital Sign     Unable to Pay for Housing in the Last Year: No     Number of Places Lived in the Last Year: 1     Unstable Housing in the Last Year: No        Current Outpatient Medications:     acetaminophen (TYLENOL) 325 mg tablet, Take 3 tablets (975 mg total) by mouth every 8 (eight) hours, Disp: , Rfl:     acetaminophen-codeine (TYLENOL with CODEINE #3) 300-30 MG per tablet, Take 1 tablet by mouth every 4 (four) hours as needed for moderate pain, Disp: 30 tablet, Rfl: 0    albuterol (Ventolin HFA) 90  mcg/act inhaler, Inhale 2 puffs every 6 (six) hours as needed for wheezing, Disp: 18 g, Rfl: 3    Apoaequorin 20 MG CAPS, Take 20 mg by mouth in the morning, Disp: 90 capsule, Rfl: 0    Aspirin (ASPIR-81 PO), take one by mouth daily, Disp: , Rfl:     atorvastatin (LIPITOR) 10 mg tablet, Take 1 tablet (10 mg total) by mouth daily with dinner, Disp: 90 tablet, Rfl: 0    Blood Glucose Monitoring Suppl (OneTouch Verio) w/Device KIT, Use 2 (two) times a day, Disp: 1 kit, Rfl: 0    buPROPion (WELLBUTRIN XL) 300 mg 24 hr tablet, Take 1 tablet (300 mg total) by mouth daily, Disp: 90 tablet, Rfl: 0    clonazePAM (KlonoPIN) 1 mg tablet, Take 1 tablet (1 mg total) by mouth every evening, Disp: 30 tablet, Rfl: 0    cyanocobalamin (VITAMIN B-12) 1000 MCG tablet, Take 1 tablet (1,000 mcg total) by mouth daily, Disp: 90 tablet, Rfl: 0    Fluticasone-Salmeterol (Advair Diskus) 250-50 mcg/dose inhaler, Inhale 1 puff 2 (two) times a day Rinse mouth after use., Disp: 60 blister, Rfl: 2    Lancets (OneTouch Delica Plus [id number]) MISC, TEST TWO TIMES A DAY, Disp: 100 each, Rfl: 5    levofloxacin (LEVAQUIN) 500 mg tablet, , Disp: , Rfl:     lisinopril (ZESTRIL) 5 mg tablet, Take 1 tablet (5 mg total) by mouth daily, Disp: 90 tablet, Rfl: 0    metFORMIN (GLUCOPHAGE) 1000 MG tablet, Take 1 tablet (1,000 mg total) by mouth 2 (two) times a day with meals, Disp: 180 tablet, Rfl: 0    Misc. Devices (Carex Coccyx Cushion) MISC, Use in the morning, Disp: 1 each, Rfl: 0    naloxone (NARCAN) 4 mg/0.1 mL nasal spray, Administer 1 spray into a nostril. If no response after 2-3 minutes, give another dose in the other nostril using a new spray., Disp: 1 each, Rfl: 1    nicotine (NICODERM CQ) 14 mg/24hr TD 24 hr patch, Place 1 patch on the skin over 24 hours daily, Disp: 28 patch, Rfl: 0    Omega-3 Fatty Acids (FISH OIL PO), 1 cap oral daily, Disp: , Rfl:     pantoprazole (PROTONIX) 40 mg tablet, Take 1 tablet (40 mg total) by mouth daily, Disp: 90  tablet, Rfl: 0    polyethylene glycol (MIRALAX) 17 g packet, Take 17 g by mouth daily, Disp: , Rfl:     pregabalin (LYRICA) 200 MG capsule, TAKE ONE CAPSULE BY MOUTH THREE TIMES A DAY, Disp: 90 capsule, Rfl: 0    rivaroxaban (Xarelto) 2.5 mg tablet, Take 1 tablet (2.5 mg total) by mouth 2 (two) times a day, Disp: 60 tablet, Rfl: 0  No current facility-administered medications for this visit.  Family History   Problem Relation Age of Onset    COPD Mother     Emphysema Mother     COPD Father     Emphysema Father       Review of Systems   All other systems reviewed and are negative.    Allergies:  Paroxetine, Adhesive [medical tape], Carafate [sucralfate], Sulfa antibiotics, and Sulfamethoxazole-trimethoprim      Objective:  /58   Pulse 80   Temp (!) 96.6 °F (35.9 °C)   Resp 20     Physical Exam  Vitals reviewed.   Feet:      Right foot:      Skin integrity: Ulcer present.      Comments: Right posterior ankle ulcer probable to subcutaneous tissue, AT not exposed. Minimal Hypergranulation tissue noted. postdebridement wound was bleeding and 100% granular.     Superficial ulcer noted to the level of dermal tissue on the right leg where the donor site with granular wound base.              Wound 02/14/24 Groin Right (Active)       Wound 02/28/24 Diabetic Ulcer Ankle Posterior;Right (Active)   Wound Image   05/09/24 0819   Wound Description Granulation tissue;Pink;Epithelialization;Hypergranulation;White 05/09/24 0818   Ani-wound Assessment Scar Tissue;Pink;Dry;Scaly 05/09/24 0818   Wound Length (cm) 3.2 cm 05/09/24 0818   Wound Width (cm) 1.5 cm 05/09/24 0818   Wound Depth (cm) 0.1 cm 05/09/24 0818   Wound Surface Area (cm^2) 4.8 cm^2 05/09/24 0818   Wound Volume (cm^3) 0.48 cm^3 05/09/24 0818   Calculated Wound Volume (cm^3) 0.48 cm^3 05/09/24 0818   Change in Wound Size % 90 05/09/24 0818   Drainage Amount Small 05/09/24 0818   Drainage Description Serosanguineous 05/09/24 0818   Non-staged Wound Description  "Full thickness 05/09/24 0818   Treatments Cleansed 05/09/24 0818   Wound packed? No 05/02/24 0844   Dressing Changed Changed 05/02/24 0844   Patient Tolerance Tolerated well 05/09/24 0818   Dressing Status Intact 05/09/24 0818       Wound 03/15/24 Donor Site Leg Medial;Right (Active)   Wound Image   05/09/24 0820   Wound Description Pink;Granulation tissue 05/09/24 0817   Ani-wound Assessment Intact;Dry 05/09/24 0817   Wound Length (cm) 0.1 cm 05/09/24 0817   Wound Width (cm) 0.1 cm 05/09/24 0817   Wound Depth (cm) 0.1 cm 05/09/24 0817   Wound Surface Area (cm^2) 0.01 cm^2 05/09/24 0817   Wound Volume (cm^3) 0.001 cm^3 05/09/24 0817   Calculated Wound Volume (cm^3) 0 cm^3 05/09/24 0817   Change in Wound Size % 100 05/09/24 0817   Drainage Amount Scant 05/09/24 0817   Drainage Description Serosanguineous 05/09/24 0817   Non-staged Wound Description Full thickness 05/09/24 0817   Treatments Cleansed 05/09/24 0817   Wound packed? No 05/02/24 0846   Dressing Changed Changed 05/02/24 0846   Patient Tolerance Tolerated well 05/09/24 0817   Dressing Status Intact 05/09/24 0817                         Debridement   Wound 02/28/24 Diabetic Ulcer Ankle Posterior;Right    Universal Protocol:  Consent: Verbal consent obtained.  Risks and benefits: risks, benefits and alternatives were discussed  Consent given by: patient  Time out: Immediately prior to procedure a \"time out\" was called to verify the correct patient, procedure, equipment, support staff and site/side marked as required.  Patient understanding: patient states understanding of the procedure being performed  Patient identity confirmed: verbally with patient    Debridement Details  Performed by: physician  Debridement type: selective  Pain control: lidocaine 4%      Post-debridement measurements  Length (cm): 3.2  Width (cm): 1.5  Depth (cm): 0.1  Percent debrided: 100%  Surface Area (cm^2): 4.8  Area Debrided (cm^2): 4.8  Volume (cm^3): 0.48    Devitalized tissue " debrided: biofilm, fibrin and slough  Instrument(s) utilized: curette  Bleeding: small  Hemostasis obtained with: pressure  Procedural pain (0-10): insensate  Post-procedural pain: insensate   Response to treatment: procedure was tolerated well         Results from last 6 Months   Lab Units 02/14/24  1539   WOUND CULTURE  1+ Growth of Klebsiella pneumoniae*  1+ Growth of Proteus vulgaris group*  Few Colonies of         Wound Instructions:  Orders Placed This Encounter   Procedures    Wound cleansing and dressings Donor Site Medial;Right Leg     Right lower leg donor site     Wash your hands with soap and water.  Remove old dressing, discard into plastic bag and place in trash.  Cleanse the wound with unscented soap and water prior to applying a clean dressing. Do not use tissue or cotton balls. Do not scrub the wound. Pat dry using gauze.    Apply Adaptic to the right lower leg  wound.  Cover with gauze.  Secure with mitch and tape   Change dressing every other day with [facility]     Standing Status:   Future     Standing Expiration Date:   5/16/2024    Wound cleansing and dressings Diabetic Ulcer Posterior;Right Ankle     Right posterior ankle wound     Wash your hands with soap and water.  Remove old dressing, discard into plastic bag and place in trash.  Cleanse the wound with unscented mild soap (unscented dove) and water prior to applying a clean dressing. Do not use tissue or cotton balls. Do not scrub the wound. Pat dry using gauze.  Shower no     Apply Polymem Ag (gray foam- blank side to wound and pattern faces outward) to the ankle wound. Cover with ABD Pad  Secure with mitch and tape.  Apply bulky dressing. Pad the bony prominences and lateral foot with ABD pad, 2 rolls cast padding, kerlex, coban. Do not wrap coban all the way up the leg.     Apply Spandagrip F to right lower leg     Change dressing EVERY OTHER DAY with [facility]          Elastic Tubular Stocking Spandagrip F - RLE  Tubular elastic  "bandage: Apply from base of toes to behind the knee. Apply in AM, may remove for sleep.     Standing Status:   Future     Standing Expiration Date:   5/16/2024    Wound Procedure Treatment Donor Site Medial;Right Leg     This order was created via procedure documentation    Wound Procedure Treatment Diabetic Ulcer Posterior;Right Ankle     This order was created via procedure documentation         Jennifer Gann DPM      Portions of the record may have been created with voice recognition software. Occasional wrong word or \"sound a like\" substitutions may have occurred due to the inherent limitations of voice recognition software. Read the chart carefully and recognize, using context, where substitutions have occurred.      "

## 2024-05-09 NOTE — PROGRESS NOTES
Wound Procedure Treatment Diabetic Ulcer Posterior;Right Ankle    Performed by: Lona Jarvis RN  Authorized by: Jennifer Gann DPM  Associated wounds:   Wound 02/28/24 Diabetic Ulcer Ankle Posterior;Right  Wound cleansed with:  NSS  Applied to periwound:  Ammonium lactate  Applied primary dressing:  Polymem foam and Silver  Applied secondary dressing:  ABD and Cast padding  Wound secured with:  Alicia, Tape, Kerlix, Coban, Elastic tubular stocking and Size F  Offloading device appllied:  Multipodus    Abd padding to roberth prominence lateral foot

## 2024-05-10 ENCOUNTER — HOME CARE VISIT (OUTPATIENT)
Dept: HOME HEALTH SERVICES | Facility: HOME HEALTHCARE | Age: 79
End: 2024-05-10
Payer: COMMERCIAL

## 2024-05-10 NOTE — TELEPHONE ENCOUNTER
Contacted daughter, Ariela, [phone], and asked [facility] to [facility] to set up an appointment for her mother, Lona Hastings.  Spoke with nurse Hooper and Dr. Manley.  Patient needs to be seen next week.  Also, called Lona Hastigns and asked her what office she thinks her and her daughter would be willing to go to for ov.  Lona Hastings said that she thinks [place] office would be best.  She said that she will have her daughter call the office to set this up.

## 2024-05-11 ENCOUNTER — HOME CARE VISIT (OUTPATIENT)
Dept: HOME HEALTH SERVICES | Facility: HOME HEALTHCARE | Age: 79
End: 2024-05-11
Payer: COMMERCIAL

## 2024-05-11 VITALS
TEMPERATURE: 97.6 F | HEART RATE: 88 BPM | BODY MASS INDEX: 30.42 KG/M2 | RESPIRATION RATE: 20 BRPM | DIASTOLIC BLOOD PRESSURE: 62 MMHG | OXYGEN SATURATION: 95 % | WEIGHT: 161 LBS | SYSTOLIC BLOOD PRESSURE: 106 MMHG

## 2024-05-11 PROCEDURE — G0299 HHS/HOSPICE OF RN EA 15 MIN: HCPCS

## 2024-05-13 ENCOUNTER — HOME CARE VISIT (OUTPATIENT)
Dept: HOME HEALTH SERVICES | Facility: HOME HEALTHCARE | Age: 79
End: 2024-05-13
Payer: COMMERCIAL

## 2024-05-13 ENCOUNTER — HOSPITAL ENCOUNTER (OUTPATIENT)
Dept: NON INVASIVE DIAGNOSTICS | Facility: HOSPITAL | Age: 79
Discharge: HOME | End: 2024-05-13
Attending: STUDENT IN AN ORGANIZED HEALTH CARE EDUCATION/TRAINING PROGRAM
Payer: COMMERCIAL

## 2024-05-13 VITALS
SYSTOLIC BLOOD PRESSURE: 128 MMHG | HEART RATE: 84 BPM | DIASTOLIC BLOOD PRESSURE: 56 MMHG | TEMPERATURE: 97.8 F | RESPIRATION RATE: 20 BRPM

## 2024-05-13 DIAGNOSIS — E11.621 DIABETIC FOOT ULCER WITH OSTEOMYELITIS (HCC): ICD-10-CM

## 2024-05-13 DIAGNOSIS — E11.69 DIABETIC FOOT ULCER WITH OSTEOMYELITIS (HCC): ICD-10-CM

## 2024-05-13 DIAGNOSIS — M86.9 DIABETIC FOOT ULCER WITH OSTEOMYELITIS (HCC): ICD-10-CM

## 2024-05-13 DIAGNOSIS — I10 ESSENTIAL HYPERTENSION: Chronic | ICD-10-CM

## 2024-05-13 DIAGNOSIS — L97.509 DIABETIC FOOT ULCER WITH OSTEOMYELITIS (HCC): ICD-10-CM

## 2024-05-13 DIAGNOSIS — I73.9 PAD (PERIPHERAL ARTERY DISEASE) (HCC): ICD-10-CM

## 2024-05-13 PROCEDURE — 93925 LOWER EXTREMITY STUDY: CPT

## 2024-05-13 PROCEDURE — 93922 UPR/L XTREMITY ART 2 LEVELS: CPT | Performed by: SURGERY

## 2024-05-13 PROCEDURE — 93925 LOWER EXTREMITY STUDY: CPT | Performed by: SURGERY

## 2024-05-13 PROCEDURE — G0299 HHS/HOSPICE OF RN EA 15 MIN: HCPCS

## 2024-05-13 PROCEDURE — 93923 UPR/LXTR ART STDY 3+ LVLS: CPT

## 2024-05-13 NOTE — TELEPHONE ENCOUNTER
Pt called for medication refills. Mentioned refill of Potassium Chloride and Solifenacin Succinate - did not see on rx list.  Asking for increase of Lisinopril to 10mg.

## 2024-05-14 RX ORDER — ACETAMINOPHEN AND CODEINE PHOSPHATE 300; 30 MG/1; MG/1
1 TABLET ORAL EVERY 4 HOURS PRN
Qty: 30 TABLET | Refills: 0 | Status: SHIPPED | OUTPATIENT
Start: 2024-05-14

## 2024-05-14 RX ORDER — LISINOPRIL 5 MG/1
5 TABLET ORAL DAILY
Qty: 90 TABLET | Refills: 1 | Status: SHIPPED | OUTPATIENT
Start: 2024-05-14

## 2024-05-15 ENCOUNTER — HOME CARE VISIT (OUTPATIENT)
Dept: HOME HEALTH SERVICES | Facility: HOME HEALTHCARE | Age: 79
End: 2024-05-15
Payer: COMMERCIAL

## 2024-05-15 VITALS
SYSTOLIC BLOOD PRESSURE: 128 MMHG | TEMPERATURE: 97.7 F | DIASTOLIC BLOOD PRESSURE: 64 MMHG | HEART RATE: 87 BPM | OXYGEN SATURATION: 94 % | RESPIRATION RATE: 18 BRPM

## 2024-05-15 PROCEDURE — G0299 HHS/HOSPICE OF RN EA 15 MIN: HCPCS

## 2024-05-15 PROCEDURE — 400014 VN F/U

## 2024-05-16 ENCOUNTER — HOME CARE VISIT (OUTPATIENT)
Dept: HOME HEALTH SERVICES | Facility: HOME HEALTHCARE | Age: 79
End: 2024-05-16
Payer: COMMERCIAL

## 2024-05-16 ENCOUNTER — OFFICE VISIT (OUTPATIENT)
Dept: WOUND CARE | Facility: CLINIC | Age: 79
End: 2024-05-16
Payer: COMMERCIAL

## 2024-05-16 VITALS
SYSTOLIC BLOOD PRESSURE: 122 MMHG | HEART RATE: 76 BPM | DIASTOLIC BLOOD PRESSURE: 76 MMHG | RESPIRATION RATE: 20 BRPM | TEMPERATURE: 96.8 F

## 2024-05-16 DIAGNOSIS — E11.622 DIABETIC ULCER OF ANKLE WITH FAT LAYER EXPOSED (HCC): Primary | ICD-10-CM

## 2024-05-16 DIAGNOSIS — L97.302 DIABETIC ULCER OF ANKLE WITH FAT LAYER EXPOSED (HCC): Primary | ICD-10-CM

## 2024-05-16 PROCEDURE — 15004 WOUND PREP F/N/HF/G: CPT | Performed by: STUDENT IN AN ORGANIZED HEALTH CARE EDUCATION/TRAINING PROGRAM

## 2024-05-16 PROCEDURE — 15002 WOUND PREP TRK/ARM/LEG: CPT | Performed by: STUDENT IN AN ORGANIZED HEALTH CARE EDUCATION/TRAINING PROGRAM

## 2024-05-16 RX ORDER — LIDOCAINE 40 MG/G
CREAM TOPICAL ONCE
Status: COMPLETED | OUTPATIENT
Start: 2024-05-16 | End: 2024-05-16

## 2024-05-16 RX ADMIN — LIDOCAINE: 40 CREAM TOPICAL at 08:55

## 2024-05-16 NOTE — PROGRESS NOTES
Wound Procedure Treatment Diabetic Ulcer Posterior;Right Ankle    Performed by: Lona Jarvis RN  Authorized by: Jennifer Gann DPM    Associated wounds:   Wound 02/28/24 Diabetic Ulcer Ankle Posterior;Right  Wound cleansed with:  NSS  Applied primary dressing:  Polymem foam and Silver  Applied secondary dressing:  ABD and Cast padding  Wound secured with:  Alicia, Tape, Kerlix, Coban, Elastic tubular stocking and Size F    ABD pad for roberth prominence lateral foot   2 rolls cast padding

## 2024-05-16 NOTE — PATIENT INSTRUCTIONS
Orders Placed This Encounter   Procedures    Wound cleansing and dressings Diabetic Ulcer Posterior;Right Ankle     Right posterior ankle wound      Wash your hands with soap and water.  Remove old dressing, discard into plastic bag and place in trash.  Cleanse the wound with unscented mild soap (unscented dove) and water prior to applying a clean dressing. Do not use tissue or cotton balls. Do not scrub the wound. Pat dry using gauze.   Shower no      Apply Polymem Ag (gray foam- blank side to wound and pattern faces outward) to the ankle wound. Cover with ABD Pad   Secure with mitch and tape.  Apply bulky dressing. Pad the bony prominences and lateral foot with ABD pad, 2 rolls cast padding, kerlex, coban. Do not wrap coban all the way up the leg.      Apply Spandagrip F to right lower leg      Change dressing EVERY OTHER DAY with [facility]          Elastic Tubular Stocking Spandagrip F - RLE   Tubular elastic bandage: Apply from base of toes to behind the knee. Apply in AM, may remove for sleep.     Standing Status:   Future     Standing Expiration Date:   5/23/2024

## 2024-05-16 NOTE — PROGRESS NOTES
Patient ID: Lona Cedeno is a 78 y.o. female Date of Birth 1945     Diagnosis:  1. 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 Topical Anesthetic used as needed to reduce discomfort. Under aseptic technique, the wound was thoroughly sharp debrided using scalpel / scissors / curette to remove all rolled wound edges, fibrotic tissue and debris from the wound base and marginal keratotic tissue so that only viable bleeding tissue remains. This was followed by irrigation with saline solution. This was done in an effort to reduce bacterial burden and provide a viable wound bed surface for the upcoming plastic surgical closure procedure. Total sq cm debrided = 4.8.     -I recommend close monitoring with local wound care as detailed below  -Discussed importance of strict offloading to the wound area  -Discussed importance of glycemic control proper protein intake  -Patient was educated on clinical signs of wound infection if present she is to call my office immediately  -Return in 1 week for follow-up      Chief Complaint   Patient presents with    Follow Up Wound Care Visit     Right ankle ulcer            Subjective:   78-year-old male with past medical history as detailed below presents with daughter for continued evaluation of right posterior lower leg/ankle ulcer care by diabetes and pressure.  Patient  reports she is doing well without any discomfort.  No other complaints today.        The following portions of the patient's history were reviewed and updated as appropriate:   Patient Active Problem List   Diagnosis    Type 2 diabetes mellitus with diabetic polyneuropathy (HCC)    Essential hypertension    Anxiety and depression    Medicare annual wellness visit, subsequent    GERD without esophagitis    Urinary incontinence    Degenerative lumbar disc    Lactic acidosis    Hypomagnesemia    Bladder neoplasm    Left renal mass    PAD (peripheral artery disease) (HCC)    Abnormal CT of the chest    COPD, severity to be determined (HCC)    Tobacco use disorder    Age-related osteoporosis without current pathological fracture    Ankle ulcer, right, with fat layer exposed (HCC)    Diabetic foot ulcer with osteomyelitis ([facility])    Preoperative cardiovascular examination    Constipation    Hematoma    Anemia    At risk for venous thromboembolism (VTE)    At high risk for skin breakdown    Wound of skin    Impaired mobility and activities of daily living    Leukocytosis    Surgical site infection    Sepsis without acute organ dysfunction ([facility])    Other dietary vitamin B12 deficiency anemia    Sacral wound    Acute respiratory failure with hypoxia (HCC)    Malignant neoplasm of renal pelvis, unspecified laterality ([facility])    Contusion of head, sequela    Chest wall pain     Past Medical History:   Diagnosis Date    Anxiety     Closed fracture of coccyx ([facility]) 2023    Diabetes mellitus (HCC)     GERD (gastroesophageal reflux disease)     Hyperlipidemia     Hypertension     IBS (irritable bowel syndrome)     Shoulder fracture      Past Surgical History:   Procedure Laterality Date    ANKLE FRACTURE SURGERY Right     has screw in place     SECTION      x2    CHOLECYSTECTOMY      CYSTOSCOPY W/ LASER LITHOTRIPSY Left 2023    Procedure: CYSTOSCOPY; RETROGRADE; URETEROSCOPY; BIOPSY; LEFT -INSERTION OF STENT;   Surgeon: Cristian Child MD;  Location: [facility];  Service: Urology    CYSTOSCOPY W/ LASER LITHOTRIPSY Left 11/20/2023    Procedure: CYSTOSCOPY; RETROGRADE; URETEROSCOPY; 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;  Surgeon: Cristian Child MD;  Location: [facility];  Service: Urology    EVACUATION OF HEMATOMA Right 2/4/2024    Procedure: EVACUATION/ DRAINAGE CHEST WALL  HEMATOMA;  Surgeon: Seth Hoyos MD;  Location: [facility];  Service: Vascular    FL RETROGRADE PYELOGRAM  9/18/2023    HERNIA REPAIR      umbilicus w/ mesh    NJ BYPASS W/VEIN AXILLARY-FEMORAL Right 1/31/2024    Procedure: BYPASS AXILLO-FEMORAL, RIGHT WITH 8MM RINGED PTFE GRAFT;  Surgeon: Seth Hoyos MD;  [facility]: [facility];  Service: Vascular    SPLIT THICKNESS SKIN GRAFT Right 2/28/2024    Procedure: SKIN GRAFT SPLIT THICKNESS (STSG)  EXTREMITY, Wound vac dressing change;  Surgeon: Rigo Yeboah DPM;  Location: [facility];  Service: Podiatry    WOUND DEBRIDEMENT Right 2/4/2024    Procedure: RIGHT WOUND AND ACHILLES DEBRIDEMENT FOOT/TOE (WASH OUT); PARTIAL AMPUTATION DISTAL 2ND TOE;  Surgeon: Aaron Montero DPM;  [facility];  Service: Podiatry    WOUND DEBRIDEMENT Right 2/14/2024    Procedure: DEBRIDEMENT RIGHT GROIN  WOUND AND WASHOUT, APPLICATION OF WOUND VAC;  Surgeon: Suly Muro DO;  [facility];  Service: Vascular    WOUND DEBRIDEMENT Right 2/19/2024    Procedure: DEBRIDEMENT LOWER EXTREMITY, washout;  Surgeon: Suly Muro DO;  [facility];  Service: Vascular     Social History     Socioeconomic History    Marital status: /Civil Union     Spouse name: None    Number of children: None    Years of education: None    Highest education level: None   Occupational History    None   Tobacco Use    Smoking status: Every Day     Current packs/day: 0.25     Average packs/day: 1 pack/day for 63.4 years (63.1 ttl pk-yrs)     Types: Cigarettes     Start  date: 1961    Smokeless tobacco: Never    Tobacco comments:     Quit January 31, 2024   Vaping Use    Vaping status: Never Used   Substance and Sexual Activity    Alcohol use: Not Currently    Drug use: Never    Sexual activity: Not Currently   Other Topics Concern    None   Social History Narrative    None     Social Determinants of Health     Financial Resource Strain: Low Risk  (2/9/2023)    Overall Financial Resource Strain (CARDIA)     Difficulty of Paying Living Expenses: Not hard at all   Food Insecurity: No Food Insecurity (4/18/2024)    Hunger Vital Sign     Worried About Running Out of Food in the Last Year: Never true     Ran Out of Food in the Last Year: Never true   Transportation Needs: No Transportation Needs (4/18/2024)    PRAPARE - Transportation     Lack of Transportation (Medical): No     Lack of Transportation (Non-Medical): No   Physical Activity: Not on file   Stress: Not on file   Social Connections: Not on file   Intimate Partner Violence: Not on file   Housing Stability: Low Risk  (4/18/2024)    Housing Stability Vital Sign     Unable to Pay for Housing in the Last Year: No     Number of Places Lived in the Last Year: 1     Unstable Housing in the Last Year: No        Current Outpatient Medications:     acetaminophen (TYLENOL) 325 mg tablet, Take 3 tablets (975 mg total) by mouth every 8 (eight) hours, Disp: , Rfl:     acetaminophen-codeine (TYLENOL with CODEINE #3) 300-30 MG per tablet, Take 1 tablet by mouth every 4 (four) hours as needed for moderate pain, Disp: 30 tablet, Rfl: 0    albuterol (Ventolin HFA) 90 mcg/act inhaler, Inhale 2 puffs every 6 (six) hours as needed for wheezing, Disp: 18 g, Rfl: 3    Apoaequorin 20 MG CAPS, Take 20 mg by mouth in the morning, Disp: 90 capsule, Rfl: 0    Aspirin (ASPIR-81 PO), take one by mouth daily, Disp: , Rfl:     atorvastatin (LIPITOR) 10 mg tablet, Take 1 tablet (10 mg total) by mouth daily with dinner, Disp: 90 tablet, Rfl: 0    Blood Glucose  Monitoring Suppl (OneTouch Verio) w/Device KIT, Use 2 (two) times a day, Disp: 1 kit, Rfl: 0    buPROPion (WELLBUTRIN XL) 300 mg 24 hr tablet, Take 1 tablet (300 mg total) by mouth daily, Disp: 90 tablet, Rfl: 0    clonazePAM (KlonoPIN) 1 mg tablet, Take 1 tablet (1 mg total) by mouth every evening, Disp: 30 tablet, Rfl: 0    cyanocobalamin (VITAMIN B-12) 1000 MCG tablet, Take 1 tablet (1,000 mcg total) by mouth daily, Disp: 90 tablet, Rfl: 0    Fluticasone-Salmeterol (Advair Diskus) 250-50 mcg/dose inhaler, Inhale 1 puff 2 (two) times a day Rinse mouth after use., Disp: 60 blister, Rfl: 2    Lancets (OneTouch Delica Plus [id number]) MISC, TEST TWO TIMES A DAY, Disp: 100 each, Rfl: 5    levofloxacin (LEVAQUIN) 500 mg tablet, , Disp: , Rfl:     lisinopril (ZESTRIL) 5 mg tablet, Take 1 tablet (5 mg total) by mouth daily, Disp: 90 tablet, Rfl: 1    metFORMIN (GLUCOPHAGE) 1000 MG tablet, Take 1 tablet (1,000 mg total) by mouth 2 (two) times a day with meals, Disp: 180 tablet, Rfl: 0    Misc. Devices (Carex Coccyx Cushion) MISC, Use in the morning, Disp: 1 each, Rfl: 0    naloxone (NARCAN) 4 mg/0.1 mL nasal spray, Administer 1 spray into a nostril. If no response after 2-3 minutes, give another dose in the other nostril using a new spray., Disp: 1 each, Rfl: 1    nicotine (NICODERM CQ) 14 mg/24hr TD 24 hr patch, Place 1 patch on the skin over 24 hours daily, Disp: 28 patch, Rfl: 0    Omega-3 Fatty Acids (FISH OIL PO), 1 cap oral daily, Disp: , Rfl:     pantoprazole (PROTONIX) 40 mg tablet, Take 1 tablet (40 mg total) by mouth daily, Disp: 90 tablet, Rfl: 0    polyethylene glycol (MIRALAX) 17 g packet, Take 17 g by mouth daily, Disp: , Rfl:     pregabalin (LYRICA) 200 MG capsule, TAKE ONE CAPSULE BY MOUTH THREE TIMES A DAY, Disp: 90 capsule, Rfl: 0    rivaroxaban (Xarelto) 2.5 mg tablet, Take 1 tablet (2.5 mg total) by mouth 2 (two) times a day, Disp: 60 tablet, Rfl: 0  No current facility-administered medications for this  visit.  Family History   Problem Relation Age of Onset    COPD Mother     Emphysema Mother     COPD Father     Emphysema Father       Review of Systems   All other systems reviewed and are negative.    Allergies:  Paroxetine, Adhesive [medical tape], Carafate [sucralfate], Sulfa antibiotics, and Sulfamethoxazole-trimethoprim      Objective:  /76   Pulse 76   Temp (!) 96.8 °F (36 °C)   Resp 20     Physical Exam  Vitals reviewed.   Feet:      Right foot:      Skin integrity: Ulcer present.      Comments: Right posterior ankle ulcer probes to subcutaneous tissue with fibrotic and granular wound base.  Postdebridement the wound was 100% granular and bleeding.            Wound 02/14/24 Groin Right (Active)       Wound 02/28/24 Diabetic Ulcer Ankle Posterior;Right (Active)   Wound Image   05/16/24 0901   Wound Description Granulation tissue;Pink;Hypergranulation;Slough;Epithelialization 05/16/24 0836   Ani-wound Assessment Scar Tissue;Pink;Dry;Scaly 05/16/24 0836   Wound Length (cm) 3.2 cm 05/16/24 0836   Wound Width (cm) 1.5 cm 05/16/24 0836   Wound Depth (cm) 0.1 cm 05/16/24 0836   Wound Surface Area (cm^2) 4.8 cm^2 05/16/24 0836   Wound Volume (cm^3) 0.48 cm^3 05/16/24 0836   Calculated Wound Volume (cm^3) 0.48 cm^3 05/16/24 0836   Change in Wound Size % 90 05/16/24 0836   Drainage Amount Small 05/16/24 0836   Drainage Description Serosanguineous 05/16/24 0836   Non-staged Wound Description Full thickness 05/16/24 0836   Treatments Cleansed 05/16/24 0836   Wound packed? No 05/02/24 0844   Dressing Changed Changed 05/02/24 0844   Patient Tolerance Tolerated well 05/16/24 0836   Dressing Status Intact 05/16/24 0836       Wound 03/15/24 Donor Site Leg Medial;Right (Active)   Wound Image   05/16/24 0834   Wound Description Epithelialization 05/16/24 0837   Ani-wound Assessment Intact 05/16/24 0837   Wound Length (cm) 0 cm 05/16/24 0837   Wound Width (cm) 0 cm 05/16/24 0837   Wound Depth (cm) 0 cm 05/16/24 0883    Wound Surface Area (cm^2) 0 cm^2 05/16/24 0837   Wound Volume (cm^3) 0 cm^3 05/16/24 0837   Calculated Wound Volume (cm^3) 0 cm^3 05/16/24 0837   Change in Wound Size % 100 05/16/24 0837   Drainage Amount None 05/16/24 0837   Drainage Description Serosanguineous 05/09/24 0817   Non-staged Wound Description Full thickness 05/09/24 0817   Treatments Cleansed 05/09/24 0817   Wound packed? No 05/02/24 0846   Dressing Changed Changed 05/02/24 0846   Patient Tolerance Tolerated well 05/09/24 0817   Dressing Status Other (Comment) 05/16/24 0837                         Procedures     Results from last 6 Months   Lab Units 02/14/24  1539   WOUND CULTURE  1+ Growth of Klebsiella pneumoniae*  1+ Growth of Proteus vulgaris group*  Few Colonies of         Wound Instructions:  Orders Placed This Encounter   Procedures    Wound cleansing and dressings Diabetic Ulcer Posterior;Right Ankle     Right posterior ankle wound      Wash your hands with soap and water.  Remove old dressing, discard into plastic bag and place in trash.  Cleanse the wound with unscented mild soap (unscented dove) and water prior to applying a clean dressing. Do not use tissue or cotton balls. Do not scrub the wound. Pat dry using gauze.   Shower no      Apply Polymem Ag (gray foam- blank side to wound and pattern faces outward) to the ankle wound. Cover with ABD Pad   Secure with mitch and tape.  Apply bulky dressing. Pad the bony prominences and lateral foot with ABD pad, 2 rolls cast padding, kerlex, coban. Do not wrap coban all the way up the leg.      Apply Spandagrip F to right lower leg      Change dressing EVERY OTHER DAY with [facility]          Elastic Tubular Stocking Spandagrip F - RLE   Tubular elastic bandage: Apply from base of toes to behind the knee. Apply in AM, may remove for sleep.     Standing Status:   Future     Standing Expiration Date:   5/23/2024    Wound Procedure Treatment Diabetic Ulcer Posterior;Right Ankle     This order  "was created via procedure documentation         Jennifer Gann DPM      Portions of the record may have been created with voice recognition software. Occasional wrong word or \"sound a like\" substitutions may have occurred due to the inherent limitations of voice recognition software. Read the chart carefully and recognize, using context, where substitutions have occurred.      "

## 2024-05-17 ENCOUNTER — OFFICE VISIT (OUTPATIENT)
Dept: VASCULAR SURGERY | Facility: CLINIC | Age: 79
End: 2024-05-17
Payer: COMMERCIAL

## 2024-05-17 VITALS
HEIGHT: 61 IN | DIASTOLIC BLOOD PRESSURE: 82 MMHG | OXYGEN SATURATION: 91 % | BODY MASS INDEX: 30.42 KG/M2 | SYSTOLIC BLOOD PRESSURE: 158 MMHG | HEART RATE: 82 BPM

## 2024-05-17 DIAGNOSIS — I73.9 PAD (PERIPHERAL ARTERY DISEASE) (HCC): Primary | Chronic | ICD-10-CM

## 2024-05-17 PROCEDURE — 99214 OFFICE O/P EST MOD 30 MIN: CPT

## 2024-05-17 NOTE — PROGRESS NOTES
Ambulatory Visit  Name: Lona Cedeno      : 1945      MRN: [id number]  Encounter Provider: ROXANE Malone  Encounter Date: 2024   Encounter department: [facility] [place]    Assessment & Plan   1. PAD (peripheral artery disease) ([facility])  Assessment & Plan:  Patient reports that her right groin incision was initially healed however she scratched herself several weeks ago causing the proximal aspect of her incision to separate.  Patient reports that she has visiting nurses that come to her home and change her dressing to her right groin. She reports significant improvement in the size of her wound over the last 2 weeks. She is currently denying any fever, chills, redness, warmth, or drainage from her right groin. Patient is currently denying any claudication, rest pain, or new tissue loss.    Imaging:  LEAD 24:  Diffuse disease is noted throughout the bilateral femoro-popliteal arteries with no significant focal stenosis.  Monophasic Doppler waveforms are see throughout.  R JOSE: 0.89/66/53, L JOSE: 0.63/61/54    Plan:  -Right groin wound is significantly improved measuring 0.5cm x 0.2cm, healthy pink granulation tissue to wound base.  -Patient instructed to wash wound daily with soap and water, cover with gauze.  -We discussed the results of LEAD at length. Improvement in JOSE bilaterally.  -Continue medical optimization with aspirin, atorvastatin, and Xarelto.  -Will repeat LEAD in 3 months  -Instructed patient to notify office of any claudication, rest pain, new tissue loss, redness, warmth, or drainage from right groin wound.  -Instructed patient to report to the ED for any symptoms of acute limb ischemia (color/temperature change, motor/sensory loss, tissue loss).  -Follow up in the office with Dr. Hoyos after LEAD    Orders:  -     VAS ARTERIAL DUPLEX- LOWER LIMB BILATERAL; Future; Expected date: 2024      History of Present Illness     Lona Cedeno is a 78 y.o.  female, current smoker, with PMH HTN, HLD, type II DM, bladder cancer, osteoporosis, COPD, and PAD s/p right axillary to femoral bypass with PTFE on 1/31/2024 ([place]), right axillary incision I&D and hematoma evacuation 2/4/2024 ([place]) and right groin washout and VAC placement 2/14/2024 ([place]). Patient is presenting to the vascular surgery office for evaluation of her right groin incision.    Patient reports that her right groin incision was initially healed however she scratched herself several weeks ago causing the proximal aspect of her incision to separate.  Patient reports that she has visiting nurses that come to her home and change her dressing to her right groin. She reports significant improvement in the size of her wound over the last 2 weeks. She is currently denying any fever, chills, redness, warmth, or drainage from her right groin. Patient is currently denying any claudication, rest pain, or new tissue loss.    Review of Systems   Constitutional: Negative.  Negative for chills and fever.   HENT: Negative.     Eyes: Negative.    Respiratory: Negative.  Negative for shortness of breath.    Cardiovascular: Negative.  Negative for chest pain and leg swelling.   Gastrointestinal: Negative.    Endocrine: Negative.    Genitourinary: Negative.    Musculoskeletal: Negative.    Skin:  Positive for wound. Negative for color change and pallor.   Allergic/Immunologic: Negative.    Neurological: Negative.  Negative for numbness.   Hematological: Negative.    Psychiatric/Behavioral: Negative.       Pertinent Medical History   Past Medical History:   Diagnosis Date    Anxiety     Closed fracture of coccyx (HCC) 05/24/2023    Diabetes mellitus (HCC)     GERD (gastroesophageal reflux disease)     Hyperlipidemia     Hypertension     IBS (irritable bowel syndrome)     Shoulder fracture      Medical History Reviewed by provider this encounter:       Past Medical History   Past Medical History:   Diagnosis  Date    Anxiety     Closed fracture of coccyx (HCC) 2023    Diabetes mellitus (HCC)     GERD (gastroesophageal reflux disease)     Hyperlipidemia     Hypertension     IBS (irritable bowel syndrome)     Shoulder fracture      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;  Surgeon: Cristian Child MD;  Location: [facility];  Service: Urology    EVACUATION OF HEMATOMA Right 2024    Procedure: EVACUATION/ DRAINAGE CHEST WALL  HEMATOMA;  Surgeon: Seth Hoyos MD;  Location: [facility];  Service: Vascular    FL RETROGRADE PYELOGRAM  2023    HERNIA REPAIR      umbilicus w/ mesh    NJ BYPASS W/VEIN AXILLARY-FEMORAL Right 2024    Procedure: BYPASS AXILLO-FEMORAL, RIGHT WITH 8MM RINGED PTFE GRAFT;  Surgeon: Seth Hoyos MD;  Location: [facility];  Service: Vascular    SPLIT THICKNESS SKIN GRAFT Right 2024    Procedure: SKIN GRAFT SPLIT THICKNESS ([facility])  EXTREMITY, Wound vac dressing change;  Surgeon: Rigo Yeboah DPM;  Location: [facility];  Service: Podiatry    WOUND DEBRIDEMENT Right 2024    Procedure: RIGHT WOUND AND ACHILLES DEBRIDEMENT FOOT/TOE (WASH OUT); PARTIAL AMPUTATION DISTAL 2ND TOE;  Surgeon: Aaron Montero DPM;  Location: [facility];  Service: Podiatry    WOUND DEBRIDEMENT Right 2024    Procedure: DEBRIDEMENT RIGHT GROIN  WOUND AND WASHOUT, APPLICATION OF WOUND VAC;  Surgeon: Suly Muro DO;  Location: [facility];  Service: Vascular    WOUND DEBRIDEMENT Right 2024     Procedure: DEBRIDEMENT LOWER EXTREMITY, washout;  Surgeon: Suly Muro DO;  [facility]: [facility];  Service: Vascular     Family History   Problem Relation Age of Onset    COPD Mother     Emphysema Mother     COPD Father     Emphysema Father      Current Outpatient Medications on File Prior to Visit   Medication Sig Dispense Refill    acetaminophen (TYLENOL) 325 mg tablet Take 3 tablets (975 mg total) by mouth every 8 (eight) hours      albuterol (Ventolin HFA) 90 mcg/act inhaler Inhale 2 puffs every 6 (six) hours as needed for wheezing 18 g 3    Apoaequorin 20 MG CAPS Take 20 mg by mouth in the morning 90 capsule 0    Aspirin (ASPIR-81 PO) take one by mouth daily      atorvastatin (LIPITOR) 10 mg tablet Take 1 tablet (10 mg total) by mouth daily with dinner 90 tablet 0    buPROPion (WELLBUTRIN XL) 300 mg 24 hr tablet Take 1 tablet (300 mg total) by mouth daily 90 tablet 0    clonazePAM (KlonoPIN) 1 mg tablet Take 1 tablet (1 mg total) by mouth every evening 30 tablet 0    cyanocobalamin (VITAMIN B-12) 1000 MCG tablet Take 1 tablet (1,000 mcg total) by mouth daily 90 tablet 0    Fluticasone-Salmeterol (Advair Diskus) 250-50 mcg/dose inhaler Inhale 1 puff 2 (two) times a day Rinse mouth after use. 60 blister 2    levofloxacin (LEVAQUIN) 500 mg tablet       lisinopril (ZESTRIL) 5 mg tablet Take 1 tablet (5 mg total) by mouth daily 90 tablet 1    metFORMIN (GLUCOPHAGE) 1000 MG tablet Take 1 tablet (1,000 mg total) by mouth 2 (two) times a day with meals 180 tablet 0    Omega-3 Fatty Acids (FISH OIL PO) 1 cap oral daily      pantoprazole (PROTONIX) 40 mg tablet Take 1 tablet (40 mg total) by mouth daily 90 tablet 0    polyethylene glycol (MIRALAX) 17 g packet Take 17 g by mouth daily      pregabalin (LYRICA) 200 MG capsule TAKE ONE CAPSULE BY MOUTH THREE TIMES A DAY 90 capsule 0    rivaroxaban (Xarelto) 2.5 mg tablet Take 1 tablet (2.5 mg total) by mouth 2 (two) times a day 60 tablet 0    acetaminophen-codeine  (TYLENOL with CODEINE #3) 300-30 MG per tablet Take 1 tablet by mouth every 4 (four) hours as needed for moderate pain (Patient not taking: Reported on 5/17/2024) 30 tablet 0    Blood Glucose Monitoring Suppl (OneTouch Verio) w/Device KIT Use 2 (two) times a day 1 kit 0    Lancets (OneTouch Delica Plus [id number]) MISC TEST TWO TIMES A  each 5    Misc. Devices (Carex Coccyx Cushion) MISC Use in the morning 1 each 0    naloxone (NARCAN) 4 mg/0.1 mL nasal spray Administer 1 spray into a nostril. If no response after 2-3 minutes, give another dose in the other nostril using a new spray. 1 each 1    nicotine (NICODERM CQ) 14 mg/24hr TD 24 hr patch Place 1 patch on the skin over 24 hours daily 28 patch 0     No current facility-administered medications on file prior to visit.     Allergies   Allergen Reactions    Paroxetine Other (See Comments)     Became suicidal    Adhesive [Medical Tape] Itching and Blisters    Carafate [Sucralfate] Other (See Comments)     Mouth sores, tongue sore    Sulfa Antibiotics Hives and Rash    Sulfamethoxazole-Trimethoprim Hives      Current Outpatient Medications on File Prior to Visit   Medication Sig Dispense Refill    acetaminophen (TYLENOL) 325 mg tablet Take 3 tablets (975 mg total) by mouth every 8 (eight) hours      albuterol (Ventolin HFA) 90 mcg/act inhaler Inhale 2 puffs every 6 (six) hours as needed for wheezing 18 g 3    Apoaequorin 20 MG CAPS Take 20 mg by mouth in the morning 90 capsule 0    Aspirin (ASPIR-81 PO) take one by mouth daily      atorvastatin (LIPITOR) 10 mg tablet Take 1 tablet (10 mg total) by mouth daily with dinner 90 tablet 0    buPROPion (WELLBUTRIN XL) 300 mg 24 hr tablet Take 1 tablet (300 mg total) by mouth daily 90 tablet 0    clonazePAM (KlonoPIN) 1 mg tablet Take 1 tablet (1 mg total) by mouth every evening 30 tablet 0    cyanocobalamin (VITAMIN B-12) 1000 MCG tablet Take 1 tablet (1,000 mcg total) by mouth daily 90 tablet 0     Fluticasone-Salmeterol (Advair Diskus) 250-50 mcg/dose inhaler Inhale 1 puff 2 (two) times a day Rinse mouth after use. 60 blister 2    levofloxacin (LEVAQUIN) 500 mg tablet       lisinopril (ZESTRIL) 5 mg tablet Take 1 tablet (5 mg total) by mouth daily 90 tablet 1    metFORMIN (GLUCOPHAGE) 1000 MG tablet Take 1 tablet (1,000 mg total) by mouth 2 (two) times a day with meals 180 tablet 0    Omega-3 Fatty Acids (FISH OIL PO) 1 cap oral daily      pantoprazole (PROTONIX) 40 mg tablet Take 1 tablet (40 mg total) by mouth daily 90 tablet 0    polyethylene glycol (MIRALAX) 17 g packet Take 17 g by mouth daily      pregabalin (LYRICA) 200 MG capsule TAKE ONE CAPSULE BY MOUTH THREE TIMES A DAY 90 capsule 0    rivaroxaban (Xarelto) 2.5 mg tablet Take 1 tablet (2.5 mg total) by mouth 2 (two) times a day 60 tablet 0    acetaminophen-codeine (TYLENOL with CODEINE #3) 300-30 MG per tablet Take 1 tablet by mouth every 4 (four) hours as needed for moderate pain (Patient not taking: Reported on 5/17/2024) 30 tablet 0    Blood Glucose Monitoring Suppl (OneTouch Verio) w/Device KIT Use 2 (two) times a day 1 kit 0    Lancets (OneTouch Delica Plus [id number]) MISC TEST TWO TIMES A  each 5    Misc. Devices (Carex Coccyx Cushion) MISC Use in the morning 1 each 0    naloxone (NARCAN) 4 mg/0.1 mL nasal spray Administer 1 spray into a nostril. If no response after 2-3 minutes, give another dose in the other nostril using a new spray. 1 each 1    nicotine (NICODERM CQ) 14 mg/24hr TD 24 hr patch Place 1 patch on the skin over 24 hours daily 28 patch 0     No current facility-administered medications on file prior to visit.      Social History     Tobacco Use    Smoking status: Every Day     Current packs/day: 0.25     Average packs/day: 1 pack/day for 63.4 years (63.1 ttl pk-yrs)     Types: Cigarettes     Start date: 1961    Smokeless tobacco: Never    Tobacco comments:     Quit January 31, 2024   Vaping Use    Vaping status: Never  "Used   Substance and Sexual Activity    Alcohol use: Not Currently    Drug use: Never    Sexual activity: Not Currently     Objective     /82 (BP Location: Left arm, Patient Position: Supine, Cuff Size: Standard)   Pulse 82   Ht 5' 1\" (1.549 m)   SpO2 91%   BMI 30.42 kg/m²     Physical Exam  Vitals and nursing note reviewed.   Constitutional:       General: She is not in acute distress.     Appearance: Normal appearance. She is well-developed.   HENT:      Head: Normocephalic and atraumatic.   Eyes:      Conjunctiva/sclera: Conjunctivae normal.   Cardiovascular:      Rate and Rhythm: Normal rate and regular rhythm.      Pulses:           Radial pulses are 2+ on the right side and 2+ on the left side.        Femoral pulses are 2+ on the right side and 0 on the left side.       Dorsalis pedis pulses are detected w/ Doppler on the right side and detected w/ Doppler on the left side.        Posterior tibial pulses are detected w/ Doppler on the right side and detected w/ Doppler on the left side.      Heart sounds: No murmur heard.  Pulmonary:      Effort: Pulmonary effort is normal. No respiratory distress.      Breath sounds: Normal breath sounds.   Abdominal:      Palpations: Abdomen is soft.      Tenderness: There is no abdominal tenderness.   Musculoskeletal:         General: No swelling or tenderness.      Cervical back: Normal range of motion and neck supple.      Right lower leg: No edema.      Left lower leg: No edema.   Skin:     General: Skin is warm and dry.      Capillary Refill: Capillary refill takes less than 2 seconds.      Findings: Wound present. No erythema, lesion or rash.      Comments: Small superficial separation of proximal aspect of right groin incision, 0.5cm x 0.2cm   Neurological:      General: No focal deficit present.      Mental Status: She is alert and oriented to person, place, and time.   Psychiatric:         Mood and Affect: Mood normal.         Behavior: Behavior normal. "       Administrative Statements   I have spent a total time of 30 minutes on 05/17/24 In caring for this patient including Diagnostic results, Prognosis, Risks and benefits of tx options, Instructions for management, Patient and family education, Importance of tx compliance, Risk factor reductions, Impressions, Counseling / Coordination of care, Documenting in the medical record, Reviewing / ordering tests, medicine, procedures  , and Obtaining or reviewing history  .

## 2024-05-18 ENCOUNTER — HOME CARE VISIT (OUTPATIENT)
Dept: HOME HEALTH SERVICES | Facility: HOME HEALTHCARE | Age: 79
End: 2024-05-18
Payer: COMMERCIAL

## 2024-05-18 PROBLEM — Z00.00 MEDICARE ANNUAL WELLNESS VISIT, SUBSEQUENT: Status: RESOLVED | Noted: 2019-09-23 | Resolved: 2024-05-18

## 2024-05-18 PROCEDURE — G0299 HHS/HOSPICE OF RN EA 15 MIN: HCPCS

## 2024-05-18 NOTE — ASSESSMENT & PLAN NOTE
Patient reports that her right groin incision was initially healed however she scratched herself several weeks ago causing the proximal aspect of her incision to separate.  Patient reports that she has visiting nurses that come to her home and change her dressing to her right groin. She reports significant improvement in the size of her wound over the last 2 weeks. She is currently denying any fever, chills, redness, warmth, or drainage from her right groin. Patient is currently denying any claudication, rest pain, or new tissue loss.    Imaging:  LEAD 5/13/24:  Diffuse disease is noted throughout the bilateral femoro-popliteal arteries with no significant focal stenosis.  Monophasic Doppler waveforms are see throughout.  R JOSE: 0.89/66/53, L JOSE: 0.63/61/54    Plan:  -Right groin wound is significantly improved measuring 0.5cm x 0.2cm, healthy pink granulation tissue to wound base.  -Patient instructed to wash wound daily with soap and water, cover with gauze.  -We discussed the results of LEAD at length. Improvement in JOSE bilaterally.  -Continue medical optimization with aspirin, atorvastatin, and Xarelto.  -Will repeat LEAD in 3 months  -Instructed patient to notify office of any claudication, rest pain, new tissue loss, redness, warmth, or drainage from right groin wound.  -Instructed patient to report to the ED for any symptoms of acute limb ischemia (color/temperature change, motor/sensory loss, tissue loss).  -Follow up in the office with Dr. Hoyos after LEAD

## 2024-05-19 VITALS
DIASTOLIC BLOOD PRESSURE: 60 MMHG | RESPIRATION RATE: 20 BRPM | HEART RATE: 72 BPM | TEMPERATURE: 97.8 F | OXYGEN SATURATION: 96 % | SYSTOLIC BLOOD PRESSURE: 122 MMHG

## 2024-05-20 ENCOUNTER — HOME CARE VISIT (OUTPATIENT)
Dept: HOME HEALTH SERVICES | Facility: HOME HEALTHCARE | Age: 79
End: 2024-05-20
Payer: COMMERCIAL

## 2024-05-20 VITALS
SYSTOLIC BLOOD PRESSURE: 104 MMHG | OXYGEN SATURATION: 95 % | DIASTOLIC BLOOD PRESSURE: 68 MMHG | HEART RATE: 74 BPM | RESPIRATION RATE: 20 BRPM | TEMPERATURE: 97.4 F

## 2024-05-20 DIAGNOSIS — E11.42 TYPE 2 DIABETES MELLITUS WITH DIABETIC POLYNEUROPATHY, WITHOUT LONG-TERM CURRENT USE OF INSULIN (HCC): Chronic | ICD-10-CM

## 2024-05-20 PROCEDURE — G0299 HHS/HOSPICE OF RN EA 15 MIN: HCPCS

## 2024-05-20 RX ORDER — PREGABALIN 200 MG/1
CAPSULE ORAL
Qty: 90 CAPSULE | Refills: 0 | Status: SHIPPED | OUTPATIENT
Start: 2024-05-20

## 2024-05-20 NOTE — TELEPHONE ENCOUNTER
Medication: Pregabalin     Dose/Frequency: 200 mg    Quantity: 90    Pharmacy: [id number]    Office:   [x] PCP/Provider - Dr. Preston  [] Speciality/Provider -     Does the patient have enough for 3 days?   [] Yes   [x] No - Send as HP to POD      Patient also states she is taking     Potassium Chloride 10 mg tab   Solifenacin-succinate 5 mg tab     Both medications are not in patients medication list. Patient is completley out of both medications. Patient states both medications were last refilled on 11/28/23    Please advise and return patient call.

## 2024-05-21 ENCOUNTER — OFFICE VISIT (OUTPATIENT)
Dept: WOUND CARE | Facility: CLINIC | Age: 79
End: 2024-05-21
Payer: COMMERCIAL

## 2024-05-21 ENCOUNTER — HOME CARE VISIT (OUTPATIENT)
Dept: HOME HEALTH SERVICES | Facility: HOME HEALTHCARE | Age: 79
End: 2024-05-21
Payer: COMMERCIAL

## 2024-05-21 VITALS
HEART RATE: 88 BPM | DIASTOLIC BLOOD PRESSURE: 66 MMHG | RESPIRATION RATE: 20 BRPM | SYSTOLIC BLOOD PRESSURE: 102 MMHG | TEMPERATURE: 97.3 F

## 2024-05-21 DIAGNOSIS — E11.622 DIABETIC ULCER OF ANKLE WITH FAT LAYER EXPOSED (HCC): Primary | ICD-10-CM

## 2024-05-21 DIAGNOSIS — L97.302 DIABETIC ULCER OF ANKLE WITH FAT LAYER EXPOSED (HCC): Primary | ICD-10-CM

## 2024-05-21 PROCEDURE — 15271 SKIN SUB GRAFT TRNK/ARM/LEG: CPT | Performed by: STUDENT IN AN ORGANIZED HEALTH CARE EDUCATION/TRAINING PROGRAM

## 2024-05-21 RX ORDER — LIDOCAINE 40 MG/G
CREAM TOPICAL ONCE
Status: COMPLETED | OUTPATIENT
Start: 2024-05-21 | End: 2024-05-21

## 2024-05-21 RX ADMIN — LIDOCAINE 1 APPLICATION: 40 CREAM TOPICAL at 14:46

## 2024-05-21 NOTE — PROGRESS NOTES
Wound Procedure Treatment Diabetic Ulcer Posterior;Right Ankle    Performed by: Katia Avalos RN  Authorized by: Jennifer Gann DPM    Associated wounds:   Wound 02/28/24 Diabetic Ulcer Ankle Posterior;Right  Wound cleansed with:  NSS  Applied primary dressing:  Other and Calcium alginate  Applied secondary dressing:  Gauze, ABD and Cast padding  Wound secured with:  Kerlix and Coban    Grafix PL applied by Dr. Gann, secured by Adaptic, steri strips, Alginate, gauze, ABD pad, Cast padding, kerlix and Coban

## 2024-05-21 NOTE — PROGRESS NOTES
51878 is the procedure code I used that visit. I have hand typed the procedure in the note please refer that as there is no procdoc for this procedure. Hope this clarifies. Thanks

## 2024-05-21 NOTE — PROGRESS NOTES
Patient ID: Lona Cedeno is a 78 y.o. female Date of Birth 1945     Diagnosis:  1. 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)  E11.622 lidocaine (LMX) 4 % cream    L97.302 Wound cleansing and dressings Diabetic Ulcer Posterior;Right Ankle     Wound Procedure Treatment Diabetic Ulcer Posterior;Right Ankle     Skin Substitute           -Skin graft substitute applied, patient to keep it intact until next visit to office  -Discussed importance of strict offloading to the wound area  -Discussed importance of glycemic control proper protein intake  -Patient was educated on clinical signs of wound infection if present she is to call my office immediately  -Return in 1 week for follow-up    Chief Complaint   Patient presents with    Follow Up Wound Care Visit     Right Posterior ankle wound.           Subjective:   78-year-old female with past medical history as detailed below presents with daughter for continued treatment of right posterior ankle ulcer complicated by pressure, diabetes peripheral arterial disease.  Patient reports no other complaints today.        The following portions of the patient's history were reviewed and updated as appropriate:   Patient Active Problem List   Diagnosis    Type 2 diabetes mellitus with diabetic polyneuropathy (HCC)    Essential hypertension    Anxiety and depression    GERD without esophagitis    Urinary incontinence    Degenerative lumbar disc    Lactic acidosis    Hypomagnesemia    Bladder neoplasm    Left renal mass    PAD (peripheral artery disease) (HCC)    Abnormal CT of the chest    COPD, severity to be determined (HCC)    Tobacco use disorder    Age-related  osteoporosis without current pathological fracture    Ankle ulcer, right, with fat layer exposed (HCC)    Diabetic foot ulcer with osteomyelitis (HCC)    Preoperative cardiovascular examination    Constipation    Hematoma    Anemia    At risk for venous thromboembolism (VTE)    At high risk for skin breakdown    Wound of skin    Impaired mobility and activities of daily living    Leukocytosis    Surgical site infection    Sepsis without acute organ dysfunction (HCC)    Other dietary vitamin B12 deficiency anemia    Sacral wound    Acute respiratory failure with hypoxia (HCC)    Malignant neoplasm of renal pelvis, unspecified laterality (HCC)    Contusion of head, sequela    Chest wall pain     Past Medical History:   Diagnosis Date    Anxiety     Closed fracture of coccyx (HCC) 2023    Diabetes mellitus (HCC)     GERD (gastroesophageal reflux disease)     Hyperlipidemia     Hypertension     IBS (irritable bowel syndrome)     Shoulder fracture      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;  Surgeon: Cristian Child MD;  Location: [facility];  Service: Urology    EVACUATION OF HEMATOMA Right 2024    Procedure: EVACUATION/ DRAINAGE CHEST WALL  HEMATOMA;  Surgeon: Seth Hoyos MD;  Location: [facility];  Service: Vascular    FL RETROGRADE PYELOGRAM  2023    HERNIA REPAIR      umbilicus w/ mesh    DE BYPASS W/VEIN AXILLARY-FEMORAL Right 2024    Procedure: BYPASS AXILLO-FEMORAL, RIGHT WITH 8MM RINGED PTFE GRAFT;   Surgeon: Seth Hoyos MD;  Location: [facility];  Service: Vascular    SPLIT THICKNESS SKIN GRAFT Right 2/28/2024    Procedure: SKIN GRAFT SPLIT THICKNESS (STSG)  EXTREMITY, Wound vac dressing change;  Surgeon: Rigo Yeboah DPM;  Location: [facility];  Service: Podiatry    WOUND DEBRIDEMENT Right 2/4/2024    Procedure: RIGHT WOUND AND ACHILLES DEBRIDEMENT FOOT/TOE (WASH OUT); PARTIAL AMPUTATION DISTAL 2ND TOE;  Surgeon: Aaron Montero DPM;  Location: [facility];  Service: Podiatry    WOUND DEBRIDEMENT Right 2/14/2024    Procedure: DEBRIDEMENT RIGHT GROIN  WOUND AND WASHOUT, APPLICATION OF WOUND VAC;  Surgeon: Suly Muro DO;  [facility]: [facility];  Service: Vascular    WOUND DEBRIDEMENT Right 2/19/2024    Procedure: DEBRIDEMENT LOWER EXTREMITY, washout;  Surgeon: Suly Muro DO;  Location: [facility];  Service: Vascular     Social History     Socioeconomic History    Marital status: /Civil Union     Spouse name: None    Number of children: None    Years of education: None    Highest education level: None   Occupational History    None   Tobacco Use    Smoking status: Every Day     Current packs/day: 0.25     Average packs/day: 1 pack/day for 63.4 years (63.1 ttl pk-yrs)     Types: Cigarettes     Start date: 1961    Smokeless tobacco: Never    Tobacco comments:     Quit January 31, 2024   Vaping Use    Vaping status: Never Used   Substance and Sexual Activity    Alcohol use: Not Currently    Drug use: Never    Sexual activity: Not Currently   Other Topics Concern    None   Social History Narrative    None     Social Determinants of Health     Financial Resource Strain: Low Risk  (2/9/2023)    Overall Financial Resource Strain (CARDIA)     Difficulty of Paying Living Expenses: Not hard at all   Food Insecurity: No Food Insecurity (4/18/2024)    Hunger Vital Sign     Worried About Running Out of Food in the Last Year: Never true     Ran Out of Food in the Last Year: Never true    Transportation Needs: No Transportation Needs (4/18/2024)    PRAPARE - Transportation     Lack of Transportation (Medical): No     Lack of Transportation (Non-Medical): No   Physical Activity: Not on file   Stress: Not on file   Social Connections: Not on file   Intimate Partner Violence: Not on file   Housing Stability: Low Risk  (4/18/2024)    Housing Stability Vital Sign     Unable to Pay for Housing in the Last Year: No     Number of Places Lived in the Last Year: 1     Unstable Housing in the Last Year: No        Current Outpatient Medications:     acetaminophen (TYLENOL) 325 mg tablet, Take 3 tablets (975 mg total) by mouth every 8 (eight) hours, Disp: , Rfl:     acetaminophen-codeine (TYLENOL with CODEINE #3) 300-30 MG per tablet, Take 1 tablet by mouth every 4 (four) hours as needed for moderate pain (Patient not taking: Reported on 5/17/2024), Disp: 30 tablet, Rfl: 0    albuterol (Ventolin HFA) 90 mcg/act inhaler, Inhale 2 puffs every 6 (six) hours as needed for wheezing, Disp: 18 g, Rfl: 3    Apoaequorin 20 MG CAPS, Take 20 mg by mouth in the morning, Disp: 90 capsule, Rfl: 0    Aspirin (ASPIR-81 PO), take one by mouth daily, Disp: , Rfl:     atorvastatin (LIPITOR) 10 mg tablet, Take 1 tablet (10 mg total) by mouth daily with dinner, Disp: 90 tablet, Rfl: 0    Blood Glucose Monitoring Suppl (OneTouch Verio) w/Device KIT, Use 2 (two) times a day, Disp: 1 kit, Rfl: 0    buPROPion (WELLBUTRIN XL) 300 mg 24 hr tablet, Take 1 tablet (300 mg total) by mouth daily, Disp: 90 tablet, Rfl: 0    clonazePAM (KlonoPIN) 1 mg tablet, Take 1 tablet (1 mg total) by mouth every evening, Disp: 30 tablet, Rfl: 0    cyanocobalamin (VITAMIN B-12) 1000 MCG tablet, Take 1 tablet (1,000 mcg total) by mouth daily, Disp: 90 tablet, Rfl: 0    Fluticasone-Salmeterol (Advair Diskus) 250-50 mcg/dose inhaler, Inhale 1 puff 2 (two) times a day Rinse mouth after use., Disp: 60 blister, Rfl: 2    Lancets (OneTouch Delica Plus [id number])  MISC, TEST TWO TIMES A DAY, Disp: 100 each, Rfl: 5    levofloxacin (LEVAQUIN) 500 mg tablet, , Disp: , Rfl:     lisinopril (ZESTRIL) 5 mg tablet, Take 1 tablet (5 mg total) by mouth daily, Disp: 90 tablet, Rfl: 1    metFORMIN (GLUCOPHAGE) 1000 MG tablet, Take 1 tablet (1,000 mg total) by mouth 2 (two) times a day with meals, Disp: 180 tablet, Rfl: 0    Misc. Devices (Carex Coccyx Cushion) MISC, Use in the morning, Disp: 1 each, Rfl: 0    naloxone (NARCAN) 4 mg/0.1 mL nasal spray, Administer 1 spray into a nostril. If no response after 2-3 minutes, give another dose in the other nostril using a new spray., Disp: 1 each, Rfl: 1    nicotine (NICODERM CQ) 14 mg/24hr TD 24 hr patch, Place 1 patch on the skin over 24 hours daily, Disp: 28 patch, Rfl: 0    Omega-3 Fatty Acids (FISH OIL PO), 1 cap oral daily, Disp: , Rfl:     pantoprazole (PROTONIX) 40 mg tablet, Take 1 tablet (40 mg total) by mouth daily, Disp: 90 tablet, Rfl: 0    polyethylene glycol (MIRALAX) 17 g packet, Take 17 g by mouth daily, Disp: , Rfl:     pregabalin (LYRICA) 200 MG capsule, TAKE ONE CAPSULE BY MOUTH THREE TIMES A DAY, Disp: 90 capsule, Rfl: 0    rivaroxaban (Xarelto) 2.5 mg tablet, Take 1 tablet (2.5 mg total) by mouth 2 (two) times a day, Disp: 60 tablet, Rfl: 0  No current facility-administered medications for this visit.  Family History   Problem Relation Age of Onset    COPD Mother     Emphysema Mother     COPD Father     Emphysema Father       Review of Systems   All other systems reviewed and are negative.    Allergies:  Paroxetine, Adhesive [medical tape], Carafate [sucralfate], Sulfa antibiotics, and Sulfamethoxazole-trimethoprim      Objective:  /66   Pulse 88   Temp (!) 97.3 °F (36.3 °C)   Resp 20     Physical Exam  Vitals reviewed.   Feet:      Right foot:      Skin integrity: Ulcer present.      Comments: Right posterior ankle ulcer probes to subcutaneous tissue with granular wound base.            Wound 02/14/24 Groin Right  "(Active)       Wound 02/28/24 Diabetic Ulcer Ankle Posterior;Right (Active)   Wound Image   05/21/24 1430   Wound Description Pale;Pink 05/21/24 1432   Ani-wound Assessment Pink;Dry;Scaly;Scar Tissue 05/21/24 1432   Wound Length (cm) 2.3 cm 05/21/24 1432   Wound Width (cm) 0.7 cm 05/21/24 1432   Wound Depth (cm) 0.1 cm 05/21/24 1432   Wound Surface Area (cm^2) 1.61 cm^2 05/21/24 1432   Wound Volume (cm^3) 0.161 cm^3 05/21/24 1432   Calculated Wound Volume (cm^3) 0.16 cm^3 05/21/24 1432   Change in Wound Size % 96.67 05/21/24 1432   Drainage Amount Small 05/21/24 1432   Drainage Description Serosanguineous 05/21/24 1432   Non-staged Wound Description Full thickness 05/21/24 1432   Treatments Cleansed 05/21/24 1432   Wound packed? No 05/02/24 0844   Dressing Changed Changed 05/02/24 0844   Patient Tolerance Tolerated well 05/16/24 0836   Dressing Status Intact 05/21/24 1432                         Skin Substitute    Universal Protocol:  Consent: Verbal consent obtained.  Risks and benefits: risks, benefits and alternatives were discussed  Consent given by: patient  Time out: Immediately prior to procedure a \"time out\" was called to verify the correct patient, procedure, equipment, support staff and site/side marked as required.  Patient understanding: patient states understanding of the procedure being performed  Patient identity confirmed: verbally with patient  Performed by: Physician  Product: Grafix PL.    Application Location and Measurements  Location: trunk / arms/ legs  Skin Sub Lot #: [id number]  Skin Sub Expiration: 05/15/2025  Area (sq cm): 6  Product Applied (sq cm): 6  Product Wasted (sq cm): 0    Application Details  Fenestrated: No  Secured: Yes  Secured With: Steri-Strips  Dressing Applied: Yes  Procedural pain (0-10): insensate  Post-procedural pain: insensate   Response to treatment: procedure was tolerated well     NSS  NSS Lot #: [id number]   NSS Expiration: 12/31/2026   Comments: graft was prepared, " "applied and affixed per  directions. JOSE was within healing range. Pt is nonsmoker. Multilayer compression/bolster wrap was placed. Wound debridement was performed prior to graft application as a courtesy to the patient. E & M of contributory conditions and other factors have been addressed. The pt appears compliant with the wound care treatment plan. Pt was instructed on post procedure care. No infection, tendon, muscle, bone, or joint capsule involvement visualized.         Results from last 6 Months   Lab Units 02/14/24  1539   WOUND CULTURE  1+ Growth of Klebsiella pneumoniae*  1+ Growth of Proteus vulgaris group*  Few Colonies of         Wound Instructions:  Orders Placed This Encounter   Procedures    Wound cleansing and dressings Diabetic Ulcer Posterior;Right Ankle     Right Posterior Ankle ulcer:    Grafix PL applied today to the ulcer in the [facility]--Dressing to remain in place for 1 week, will be changed next Thursday in Dr. Salgado office    Hold on [facility] services for 1 week and will re evaluate after patient is seen by Dr. Gann next week      Off-loading Instructions:  Keep weight and pressure off wound at all times  Wear off-loading device as directed by your physician----Prevalon Boot to the Right Foot    Follow up with Dr Gann in her Podiatry office in 1 week and 2 weeks at the [facility]     Standing Status:   Future     Standing Expiration Date:   5/28/2024    Wound Procedure Treatment Diabetic Ulcer Posterior;Right Ankle     This order was created via procedure documentation    Skin Substitute     This order was created via procedure documentation         Jennifer Gann DPM      Portions of the record may have been created with voice recognition software. Occasional wrong word or \"sound a like\" substitutions may have occurred due to the inherent limitations of voice recognition software. Read the chart carefully and recognize, using context, where substitutions have occurred.      "

## 2024-05-21 NOTE — PROGRESS NOTES
Procedure: Surgical preparation of skin graft recipient sites 18311 CPT along with detailed procedure with measurements of the wound prepped was added in the note.

## 2024-05-21 NOTE — PATIENT INSTRUCTIONS
Orders Placed This Encounter   Procedures    Wound cleansing and dressings Diabetic Ulcer Posterior;Right Ankle     Right Posterior Ankle ulcer:    Grafix PL applied today to the ulcer in the [facility]--Dressing to remain in place for 1 week, will be changed next Thursday in Dr. Salgado office    Hold on [facility] services for 1 week and will re evaluate after patient is seen by Dr. Gann next week      Off-loading Instructions:  Keep weight and pressure off wound at all times  Wear off-loading device as directed by your physician----Prevalon Boot to the Right Foot    Follow up with Dr Gann in her Podiatry office in 1 week and 2 weeks at the [facility]     Standing Status:   Future     Standing Expiration Date:   5/28/2024

## 2024-05-23 PROCEDURE — G0179 MD RECERTIFICATION HHA PT: HCPCS | Performed by: STUDENT IN AN ORGANIZED HEALTH CARE EDUCATION/TRAINING PROGRAM

## 2024-05-30 ENCOUNTER — OFFICE VISIT (OUTPATIENT)
Dept: PODIATRY | Facility: CLINIC | Age: 79
End: 2024-05-30
Payer: COMMERCIAL

## 2024-05-30 VITALS
BODY MASS INDEX: 30.42 KG/M2 | HEIGHT: 61 IN | SYSTOLIC BLOOD PRESSURE: 121 MMHG | HEART RATE: 106 BPM | DIASTOLIC BLOOD PRESSURE: 77 MMHG

## 2024-05-30 DIAGNOSIS — L97.302 DIABETIC ULCER OF ANKLE ASSOCIATED WITH TYPE 2 DIABETES MELLITUS, WITH FAT LAYER EXPOSED (HCC): ICD-10-CM

## 2024-05-30 DIAGNOSIS — L84 CALLUS: ICD-10-CM

## 2024-05-30 DIAGNOSIS — E11.622 DIABETIC ULCER OF ANKLE ASSOCIATED WITH TYPE 2 DIABETES MELLITUS, WITH FAT LAYER EXPOSED (HCC): ICD-10-CM

## 2024-05-30 DIAGNOSIS — E11.49 OTHER DIABETIC NEUROLOGICAL COMPLICATION ASSOCIATED WITH TYPE 2 DIABETES MELLITUS (HCC): Primary | ICD-10-CM

## 2024-05-30 DIAGNOSIS — B35.1 ONYCHOMYCOSIS: ICD-10-CM

## 2024-05-30 PROCEDURE — 11055 PARING/CUTG B9 HYPRKER LES 1: CPT | Performed by: STUDENT IN AN ORGANIZED HEALTH CARE EDUCATION/TRAINING PROGRAM

## 2024-05-30 PROCEDURE — 99213 OFFICE O/P EST LOW 20 MIN: CPT | Performed by: STUDENT IN AN ORGANIZED HEALTH CARE EDUCATION/TRAINING PROGRAM

## 2024-05-30 PROCEDURE — 11042 DBRDMT SUBQ TIS 1ST 20SQCM/<: CPT | Performed by: STUDENT IN AN ORGANIZED HEALTH CARE EDUCATION/TRAINING PROGRAM

## 2024-05-30 PROCEDURE — 11721 DEBRIDE NAIL 6 OR MORE: CPT | Performed by: STUDENT IN AN ORGANIZED HEALTH CARE EDUCATION/TRAINING PROGRAM

## 2024-05-31 ENCOUNTER — TELEPHONE (OUTPATIENT)
Facility: HOSPITAL | Age: 79
End: 2024-05-31

## 2024-05-31 ENCOUNTER — HOME CARE VISIT (OUTPATIENT)
Dept: HOME HEALTH SERVICES | Facility: HOME HEALTHCARE | Age: 79
End: 2024-05-31
Payer: COMMERCIAL

## 2024-05-31 DIAGNOSIS — L97.302 DIABETIC ULCER OF ANKLE ASSOCIATED WITH TYPE 2 DIABETES MELLITUS, WITH FAT LAYER EXPOSED (HCC): Primary | ICD-10-CM

## 2024-05-31 DIAGNOSIS — E11.622 DIABETIC ULCER OF ANKLE ASSOCIATED WITH TYPE 2 DIABETES MELLITUS, WITH FAT LAYER EXPOSED (HCC): Primary | ICD-10-CM

## 2024-05-31 NOTE — PROGRESS NOTES
Ambulatory Visit  Name: Lona Cedeno      : 1945      MRN: [id number]  Encounter Provider: Jennifer Gann DPM  Encounter Date: 2024   Encounter department: [facility] PODIATRY [place]    Assessment & Plan   1. Other diabetic neurological complication associated with type 2 diabetes mellitus (HCC)  -     Lesion Destruction  2. Onychomycosis  3. Diabetic ulcer of ankle associated with type 2 diabetes mellitus, with fat layer exposed (HCC)  -     Debridement Diabetic Ulcer Posterior;Right Ankle  4. Callus  -     Lesion Destruction      Plan:     1. A thorough neurovascular exam was performed. Patient presents for at-risk foot care.  Patient has no acute concerns today.  Patient has significant lower extremity risk due to neuropathy, parasthesia, edema, and trophic skin changes to the lower extremity and hx toe amputation. Patient has Q7  findings and is recommended for at risk foot care every 3 months.    2. All 9 toenails were debridement as follow: using nail nipper, and curette, nails were sharply debrided, reduced in thickness and length. Devitalized nail tissue and fungal debris excised and removed. Patient tolerated well.      3. Hyperkeratotic lesions were sharply trimmed to normal epithelium with a 15 blade without incident. Patient was instructed to use OTC lotion or prescription to their feet.     4. Right posterior ankle diabetic ulcer appears stable, he also was debrided as detailed below.  Recommended local wound care with Acticoat and dry sterile dressing.  Follow-up at wound care center in 1 week for recheck.    5. Patient was educated on importance of glycemic control, daily foot assessment and proper shoe gear. Educational materials were provided. Patient will follow up annually for diabetic foot risk assessment.     6.  Recent PCP notes reviewed, recent blood work reviewed    7. Return in 10-12 weeks.     Lab Results   Component Value Date    HGBA1C 6.0 (H) 2024              History of Present Illness   HPI:  Lona Cedeno is a 78 y.o. female who presents with painful, elongated toenails and ulcer check. They have difficulty applying their socks and shoes due to the elongation of the nails. The pressure within their shoe gear is painful and they have been unable to cut their nails adequately. Requires at risk foot care.     Review of Systems   All other systems reviewed and are negative.    Medical History Reviewed by provider this encounter:       Past Medical History   Past Medical History:   Diagnosis Date    Anxiety     Closed fracture of coccyx (HCC) 2023    Diabetes mellitus (HCC)     GERD (gastroesophageal reflux disease)     Hyperlipidemia     Hypertension     IBS (irritable bowel syndrome)     Shoulder fracture      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;  Surgeon: Cristian Child MD;  Location: [facility];  Service: Urology    EVACUATION OF HEMATOMA Right 2024    Procedure: EVACUATION/ DRAINAGE CHEST WALL  HEMATOMA;  Surgeon: Seth Hoyos MD;  Location: [facility];  Service: Vascular    FL RETROGRADE PYELOGRAM  2023    HERNIA REPAIR      umbilicus w/ mesh    SD BYPASS W/VEIN AXILLARY-FEMORAL Right 2024    Procedure: BYPASS AXILLO-FEMORAL, RIGHT WITH 8MM RINGED PTFE GRAFT;  Surgeon: Seth Hoyos MD;  Location: [facility];  Service: Vascular    SPLIT THICKNESS SKIN GRAFT Right 2024    Procedure: SKIN GRAFT SPLIT THICKNESS (STSG)  EXTREMITY,  Wound vac dressing change;  Surgeon: Rigo Yeboah DPM;  Location: [facility];  Service: Podiatry    WOUND DEBRIDEMENT Right 2/4/2024    Procedure: RIGHT WOUND AND ACHILLES DEBRIDEMENT FOOT/TOE (WASH OUT); PARTIAL AMPUTATION DISTAL 2ND TOE;  Surgeon: Aaron Montero DPM;  Location: [facility];  Service: Podiatry    WOUND DEBRIDEMENT Right 2/14/2024    Procedure: DEBRIDEMENT RIGHT GROIN  WOUND AND WASHOUT, APPLICATION OF WOUND VAC;  Surgeon: Suly Muro DO;  [facility];  Service: Vascular    WOUND DEBRIDEMENT Right 2/19/2024    Procedure: DEBRIDEMENT LOWER EXTREMITY, washout;  Surgeon: Suly Muro DO;  [facility];  Service: Vascular     Family History   Problem Relation Age of Onset    COPD Mother     Emphysema Mother     COPD Father     Emphysema Father      Current Outpatient Medications on File Prior to Visit   Medication Sig Dispense Refill    acetaminophen (TYLENOL) 325 mg tablet Take 3 tablets (975 mg total) by mouth every 8 (eight) hours      albuterol (Ventolin HFA) 90 mcg/act inhaler Inhale 2 puffs every 6 (six) hours as needed for wheezing 18 g 3    Apoaequorin 20 MG CAPS Take 20 mg by mouth in the morning 90 capsule 0    Aspirin (ASPIR-81 PO) take one by mouth daily      atorvastatin (LIPITOR) 10 mg tablet Take 1 tablet (10 mg total) by mouth daily with dinner 90 tablet 0    Blood Glucose Monitoring Suppl (OneTouch Verio) w/Device KIT Use 2 (two) times a day 1 kit 0    buPROPion (WELLBUTRIN XL) 300 mg 24 hr tablet Take 1 tablet (300 mg total) by mouth daily 90 tablet 0    clonazePAM (KlonoPIN) 1 mg tablet Take 1 tablet (1 mg total) by mouth every evening 30 tablet 0    cyanocobalamin (VITAMIN B-12) 1000 MCG tablet Take 1 tablet (1,000 mcg total) by mouth daily 90 tablet 0    Fluticasone-Salmeterol (Advair Diskus) 250-50 mcg/dose inhaler Inhale 1 puff 2 (two) times a day Rinse mouth after use. 60 blister 2    Lancets (OneTouch Delica Plus [id number]) MISC TEST TWO TIMES A   each 5    levofloxacin (LEVAQUIN) 500 mg tablet       lisinopril (ZESTRIL) 5 mg tablet Take 1 tablet (5 mg total) by mouth daily 90 tablet 1    metFORMIN (GLUCOPHAGE) 1000 MG tablet Take 1 tablet (1,000 mg total) by mouth 2 (two) times a day with meals 180 tablet 0    Misc. Devices (Carex Coccyx Cushion) MISC Use in the morning 1 each 0    naloxone (NARCAN) 4 mg/0.1 mL nasal spray Administer 1 spray into a nostril. If no response after 2-3 minutes, give another dose in the other nostril using a new spray. 1 each 1    nicotine (NICODERM CQ) 14 mg/24hr TD 24 hr patch Place 1 patch on the skin over 24 hours daily 28 patch 0    Omega-3 Fatty Acids (FISH OIL PO) 1 cap oral daily      pantoprazole (PROTONIX) 40 mg tablet Take 1 tablet (40 mg total) by mouth daily 90 tablet 0    polyethylene glycol (MIRALAX) 17 g packet Take 17 g by mouth daily      pregabalin (LYRICA) 200 MG capsule TAKE ONE CAPSULE BY MOUTH THREE TIMES A DAY 90 capsule 0    rivaroxaban (Xarelto) 2.5 mg tablet Take 1 tablet (2.5 mg total) by mouth 2 (two) times a day 60 tablet 0    acetaminophen-codeine (TYLENOL with CODEINE #3) 300-30 MG per tablet Take 1 tablet by mouth every 4 (four) hours as needed for moderate pain (Patient not taking: Reported on 5/17/2024) 30 tablet 0     No current facility-administered medications on file prior to visit.     Allergies   Allergen Reactions    Paroxetine Other (See Comments)     Became suicidal    Adhesive [Medical Tape] Itching and Blisters    Carafate [Sucralfate] Other (See Comments)     Mouth sores, tongue sore    Sulfa Antibiotics Hives and Rash    Sulfamethoxazole-Trimethoprim Hives      Current Outpatient Medications on File Prior to Visit   Medication Sig Dispense Refill    acetaminophen (TYLENOL) 325 mg tablet Take 3 tablets (975 mg total) by mouth every 8 (eight) hours      albuterol (Ventolin HFA) 90 mcg/act inhaler Inhale 2 puffs every 6 (six) hours as needed for wheezing 18 g 3    Apoaequorin  20 MG CAPS Take 20 mg by mouth in the morning 90 capsule 0    Aspirin (ASPIR-81 PO) take one by mouth daily      atorvastatin (LIPITOR) 10 mg tablet Take 1 tablet (10 mg total) by mouth daily with dinner 90 tablet 0    Blood Glucose Monitoring Suppl (OneTouch Verio) w/Device KIT Use 2 (two) times a day 1 kit 0    buPROPion (WELLBUTRIN XL) 300 mg 24 hr tablet Take 1 tablet (300 mg total) by mouth daily 90 tablet 0    clonazePAM (KlonoPIN) 1 mg tablet Take 1 tablet (1 mg total) by mouth every evening 30 tablet 0    cyanocobalamin (VITAMIN B-12) 1000 MCG tablet Take 1 tablet (1,000 mcg total) by mouth daily 90 tablet 0    Fluticasone-Salmeterol (Advair Diskus) 250-50 mcg/dose inhaler Inhale 1 puff 2 (two) times a day Rinse mouth after use. 60 blister 2    Lancets (OneTouch Delica Plus [id number]) MISC TEST TWO TIMES A  each 5    levofloxacin (LEVAQUIN) 500 mg tablet       lisinopril (ZESTRIL) 5 mg tablet Take 1 tablet (5 mg total) by mouth daily 90 tablet 1    metFORMIN (GLUCOPHAGE) 1000 MG tablet Take 1 tablet (1,000 mg total) by mouth 2 (two) times a day with meals 180 tablet 0    Misc. Devices (Carex Coccyx Cushion) MISC Use in the morning 1 each 0    naloxone (NARCAN) 4 mg/0.1 mL nasal spray Administer 1 spray into a nostril. If no response after 2-3 minutes, give another dose in the other nostril using a new spray. 1 each 1    nicotine (NICODERM CQ) 14 mg/24hr TD 24 hr patch Place 1 patch on the skin over 24 hours daily 28 patch 0    Omega-3 Fatty Acids (FISH OIL PO) 1 cap oral daily      pantoprazole (PROTONIX) 40 mg tablet Take 1 tablet (40 mg total) by mouth daily 90 tablet 0    polyethylene glycol (MIRALAX) 17 g packet Take 17 g by mouth daily      pregabalin (LYRICA) 200 MG capsule TAKE ONE CAPSULE BY MOUTH THREE TIMES A DAY 90 capsule 0    rivaroxaban (Xarelto) 2.5 mg tablet Take 1 tablet (2.5 mg total) by mouth 2 (two) times a day 60 tablet 0    acetaminophen-codeine (TYLENOL with CODEINE #3) 300-30  "MG per tablet Take 1 tablet by mouth every 4 (four) hours as needed for moderate pain (Patient not taking: Reported on 5/17/2024) 30 tablet 0     No current facility-administered medications on file prior to visit.      Objective     /77 (BP Location: Right arm, Patient Position: Sitting, Cuff Size: Large)   Pulse (!) 106   Ht 5' 1\" (1.549 m)   BMI 30.42 kg/m²     Physical Exam  Vitals reviewed.   Cardiovascular:      Pulses: Pulses are weak.           Dorsalis pedis pulses are 0 on the right side and 0 on the left side.        Posterior tibial pulses are 0 on the right side and 0 on the left side.   Musculoskeletal:      Right foot: Deformity present.      Left foot: Deformity present.   Feet:      Right foot:      Protective Sensation: 10 sites tested.  0 sites sensed.      Skin integrity: Ulcer present.      Toenail Condition: Right toenails are abnormally thick and long. Fungal disease present.     Left foot:      Protective Sensation: 10 sites tested.  0 sites sensed.      Skin integrity: Callus present.      Toenail Condition: Left toenails are abnormally thick and long. Fungal disease present.     Comments: On exam patient has thickened, hypertrophic, discolored, brittle toenails with subungual debris and tenderness x9  Callus: Left foot.   Patient has lower extremity edema  Patients skin is atrophic, thickened nails, and decreased pedal hair. Patient has decreased pinprick and vibratory sensation to feet and parasthesia.   History of right second digit partial amputation.  Right posterior ankle ulcer appears stable with granular fibrotic wound base and mildly macerated periwound.  Postdebridement wound was granular and bleeding.    Multiple hammertoe deformity noted.        Diabetic Foot Exam    Patient's shoes and socks removed.    Right Foot/Ankle   Right Foot Inspection  Skin Exam: ulcer.     Toe Exam: right toe deformity.     Sensory   Monofilament testing: diminished    Vascular  The right DP " "pulse is 0. The right PT pulse is 0.     Left Foot/Ankle  Left Foot Inspection  Skin Exam: callus.     Toe Exam: left toe deformity.     Sensory   Monofilament testing: diminished    Vascular  The left DP pulse is 0. The left PT pulse is 0.     Assign Risk Category  Deformity present  Loss of protective sensation  Weak pulses  Risk: 3    Debridement   Wound 02/28/24 Diabetic Ulcer Ankle Posterior;Right    Universal Protocol:  Consent: Verbal consent obtained.  Risks and benefits: risks, benefits and alternatives were discussed  Consent given by: patient  Time out: Immediately prior to procedure a \"time out\" was called to verify the correct patient, procedure, equipment, support staff and site/side marked as required.  Patient understanding: patient states understanding of the procedure being performed  Patient identity confirmed: verbally with patient    Debridement Details  Performed by: physician  Debridement type: surgical  Level of debridement: subcutaneous tissue      Post-debridement measurements  Length (cm): 2  Width (cm): 0.9  Depth (cm): 0.2  Percent debrided: 100%  Surface Area (cm^2): 1.8  Area Debrided (cm^2): 1.8  Volume (cm^3): 0.36    Tissue and other material debrided: subcutaneous tissue  Devitalized tissue debrided: biofilm, exudate, fibrin and slough  Instrument(s) utilized: curette  Technique utilized: excisionalBleeding: small  Hemostasis obtained with: pressure  Procedural pain (0-10): insensate  Post-procedural pain: insensate   Response to treatment: procedure was tolerated well    Lesion Destruction    Date/Time: 5/30/2024 3:15 PM    Performed by: Jennifer Gann DPM  Authorized by: Jennifer Gann DPM  Universal Protocol:  Consent: Verbal consent obtained.  Risks and benefits: risks, benefits and alternatives were discussed  Consent given by: patient  Time out: Immediately prior to procedure a \"time out\" was called to verify the correct patient, procedure, equipment, support staff and site/side " marked as required.  Patient understanding: patient states understanding of the procedure being performed  Patient identity confirmed: verbally with patient    Procedure Details - Lesion Destruction:     Number of Lesions:  1  Lesion 1:     Body area:  Lower extremity    Lower extremity location:  L foot    Malignancy: benign hyperkeratotic lesion      Destruction method: scissors used for extraction

## 2024-06-04 ENCOUNTER — OFFICE VISIT (OUTPATIENT)
Facility: HOSPITAL | Age: 79
End: 2024-06-04
Payer: COMMERCIAL

## 2024-06-04 ENCOUNTER — HOME CARE VISIT (OUTPATIENT)
Dept: HOME HEALTH SERVICES | Facility: HOME HEALTHCARE | Age: 79
End: 2024-06-04
Payer: COMMERCIAL

## 2024-06-04 VITALS
SYSTOLIC BLOOD PRESSURE: 122 MMHG | DIASTOLIC BLOOD PRESSURE: 74 MMHG | HEART RATE: 76 BPM | RESPIRATION RATE: 20 BRPM | TEMPERATURE: 96.6 F

## 2024-06-04 DIAGNOSIS — E11.622 DIABETIC ULCER OF ANKLE WITH FAT LAYER EXPOSED (HCC): Primary | ICD-10-CM

## 2024-06-04 DIAGNOSIS — L97.302 DIABETIC ULCER OF ANKLE WITH FAT LAYER EXPOSED (HCC): Primary | ICD-10-CM

## 2024-06-04 PROCEDURE — 15271 SKIN SUB GRAFT TRNK/ARM/LEG: CPT | Performed by: STUDENT IN AN ORGANIZED HEALTH CARE EDUCATION/TRAINING PROGRAM

## 2024-06-04 RX ORDER — LIDOCAINE 40 MG/G
CREAM TOPICAL ONCE
Status: COMPLETED | OUTPATIENT
Start: 2024-06-04 | End: 2024-06-04

## 2024-06-04 RX ADMIN — LIDOCAINE: 40 CREAM TOPICAL at 15:46

## 2024-06-04 NOTE — PROGRESS NOTES
Wound Procedure Treatment Diabetic Ulcer Posterior;Right Ankle    Performed by: Lona Jarvis RN  Authorized by: Jennifer Gann DPM    Associated wounds:   Wound 02/28/24 Diabetic Ulcer Ankle Posterior;Right  Wound cleansed with:  NSS  Applied primary dressing:  Other, Non adherent contact layer and Calcium alginate  Applied secondary dressing:  ABD and Cast padding  Dressing secured with:  Alicia, Tape, Kerlix, Coban, Elastic tubular stocking and Size F    Grafix PL applied by Dr Gann     Steristrips applied by Dr. Gann

## 2024-06-04 NOTE — PATIENT INSTRUCTIONS
Orders Placed This Encounter   Procedures    Wound cleansing and dressings Diabetic Ulcer Posterior;Right Ankle     Grafix PL applied today to the ulcer in the [facility]--Dressing to remain in place for 1 week, will be [facility]      Hold  [facility] services for 1 week and will re evaluate after patient is seen by Dr. Gann next week        Off-loading Instructions:  Keep weight and pressure off wound at all times  Wear off-loading device as directed by your physician----Prevalon Boot to the Right Foot     Follow up with Dr Gann  at the [facility] in  1 week     Standing Status:   Future     Standing Expiration Date:   6/11/2024

## 2024-06-04 NOTE — PROGRESS NOTES
Patient ID: Lona Cedeno is a 78 y.o. female Date of Birth 1945     Diagnosis:  1. 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)  -     Wound cleansing and dressings Diabetic Ulcer Posterior;Right Ankle; Future  -     lidocaine (LMX) 4 % cream  -     Skin Substitute  -     Wound Procedure Treatment Diabetic Ulcer Posterior;Right Ankle     Diagnosis ICD-10-CM Associated Orders   1. 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)  E11.622 Wound cleansing and dressings Diabetic Ulcer Posterior;Right Ankle    L97.302 lidocaine (LMX) 4 % cream     Skin Substitute     Wound Procedure Treatment Diabetic Ulcer Posterior;Right Ankle           -Skin graft substitute applied, patient to keep it intact until next visit to office  -Discussed importance of strict offloading to the wound area  -Discussed importance of glycemic control proper protein intake  -Patient was educated on clinical signs of wound infection if present she is to call my office immediately  -Return in 1 week for follow-u    Chief Complaint   Patient presents with    Follow Up Wound Care Visit     Right ankle ulcer            Subjective:   78-year-old female with past medical history as detailed below presents with daughter for continued valuation of right posterior diabetic ankle ulcer complicated by pressure and diabetes peripheral arterial disease.  Patient denies any other complaints.        The following portions of the patient's history were reviewed and updated as appropriate:   Patient Active Problem List   Diagnosis    Type 2 diabetes mellitus with diabetic polyneuropathy (HCC)    Essential hypertension    Anxiety and depression    GERD without esophagitis    Urinary incontinence    Degenerative lumbar disc    Lactic acidosis    Hypomagnesemia    Bladder neoplasm    Left renal mass    PAD (peripheral artery disease) (HCC)    Abnormal CT of the chest    COPD, severity to be determined (HCC)    Tobacco use disorder    Age-related  osteoporosis without current pathological fracture    Ankle ulcer, right, with fat layer exposed (HCC)    Diabetic foot ulcer with osteomyelitis (HCC)    Preoperative cardiovascular examination    Constipation    Hematoma    Anemia    At risk for venous thromboembolism (VTE)    At high risk for skin breakdown    Wound of skin    Impaired mobility and activities of daily living    Leukocytosis    Surgical site infection    Sepsis without acute organ dysfunction (HCC)    Other dietary vitamin B12 deficiency anemia    Sacral wound    Acute respiratory failure with hypoxia (HCC)    Malignant neoplasm of renal pelvis, unspecified laterality (HCC)    Contusion of head, sequela    Chest wall pain     Past Medical History:   Diagnosis Date    Anxiety     Closed fracture of coccyx (HCC) 2023    Diabetes mellitus (HCC)     GERD (gastroesophageal reflux disease)     Hyperlipidemia     Hypertension     IBS (irritable bowel syndrome)     Shoulder fracture      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;  Surgeon: Cristian Child MD;  Location: [facility];  Service: Urology    EVACUATION OF HEMATOMA Right 2024    Procedure: EVACUATION/ DRAINAGE CHEST WALL  HEMATOMA;  Surgeon: Seth Hoyos MD;  Location: [facility];  Service: Vascular    FL RETROGRADE PYELOGRAM  2023    HERNIA REPAIR      umbilicus w/ mesh    MT BYPASS W/VEIN AXILLARY-FEMORAL Right 2024    Procedure: BYPASS AXILLO-FEMORAL, RIGHT WITH 8MM RINGED PTFE GRAFT;   Surgeon: Seth Hoyos MD;  Location: [facility];  Service: Vascular    SPLIT THICKNESS SKIN GRAFT Right 2/28/2024    Procedure: SKIN GRAFT SPLIT THICKNESS (STSG)  EXTREMITY, Wound vac dressing change;  Surgeon: Rigo Yeboah DPM;  Location: [facility];  Service: Podiatry    WOUND DEBRIDEMENT Right 2/4/2024    Procedure: RIGHT WOUND AND ACHILLES DEBRIDEMENT FOOT/TOE (WASH OUT); PARTIAL AMPUTATION DISTAL 2ND TOE;  Surgeon: Aaron Montero DPM;  Location: [facility];  Service: Podiatry    WOUND DEBRIDEMENT Right 2/14/2024    Procedure: DEBRIDEMENT RIGHT GROIN  WOUND AND WASHOUT, APPLICATION OF WOUND VAC;  Surgeon: Suly Muro DO;  [facility];  Service: Vascular    WOUND DEBRIDEMENT Right 2/19/2024    Procedure: DEBRIDEMENT LOWER EXTREMITY, washout;  Surgeon: Suly Muro DO;  [facility];  Service: Vascular     Social History     Socioeconomic History    Marital status: /Civil Union     Spouse name: None    Number of children: None    Years of education: None    Highest education level: None   Occupational History    None   Tobacco Use    Smoking status: Every Day     Current packs/day: 0.25     Average packs/day: 1 pack/day for 63.4 years (63.1 ttl pk-yrs)     Types: Cigarettes     Start date: 1961    Smokeless tobacco: Never    Tobacco comments:     Quit January 31, 2024   Vaping Use    Vaping status: Never Used   Substance and Sexual Activity    Alcohol use: Not Currently    Drug use: Never    Sexual activity: Not Currently   Other Topics Concern    None   Social History Narrative    None     Social Determinants of Health     Financial Resource Strain: Low Risk  (2/9/2023)    Overall Financial Resource Strain (CARDIA)     Difficulty of Paying Living Expenses: Not hard at all   Food Insecurity: No Food Insecurity (4/18/2024)    Hunger Vital Sign     Worried About Running Out of Food in the Last Year: Never true     Ran Out of Food in the Last Year: Never true    Transportation Needs: No Transportation Needs (4/18/2024)    PRAPARE - Transportation     Lack of Transportation (Medical): No     Lack of Transportation (Non-Medical): No   Physical Activity: Not on file   Stress: Not on file   Social Connections: Not on file   Intimate Partner Violence: Not on file   Housing Stability: Low Risk  (4/18/2024)    Housing Stability Vital Sign     Unable to Pay for Housing in the Last Year: No     Number of Places Lived in the Last Year: 1     Unstable Housing in the Last Year: No        Current Outpatient Medications:     acetaminophen (TYLENOL) 325 mg tablet, Take 3 tablets (975 mg total) by mouth every 8 (eight) hours, Disp: , Rfl:     acetaminophen-codeine (TYLENOL with CODEINE #3) 300-30 MG per tablet, Take 1 tablet by mouth every 4 (four) hours as needed for moderate pain (Patient not taking: Reported on 5/17/2024), Disp: 30 tablet, Rfl: 0    albuterol (Ventolin HFA) 90 mcg/act inhaler, Inhale 2 puffs every 6 (six) hours as needed for wheezing, Disp: 18 g, Rfl: 3    Apoaequorin 20 MG CAPS, Take 20 mg by mouth in the morning, Disp: 90 capsule, Rfl: 0    Aspirin (ASPIR-81 PO), take one by mouth daily, Disp: , Rfl:     atorvastatin (LIPITOR) 10 mg tablet, Take 1 tablet (10 mg total) by mouth daily with dinner, Disp: 90 tablet, Rfl: 0    Blood Glucose Monitoring Suppl (OneTouch Verio) w/Device KIT, Use 2 (two) times a day, Disp: 1 kit, Rfl: 0    buPROPion (WELLBUTRIN XL) 300 mg 24 hr tablet, Take 1 tablet (300 mg total) by mouth daily, Disp: 90 tablet, Rfl: 0    clonazePAM (KlonoPIN) 1 mg tablet, Take 1 tablet (1 mg total) by mouth every evening, Disp: 30 tablet, Rfl: 0    cyanocobalamin (VITAMIN B-12) 1000 MCG tablet, Take 1 tablet (1,000 mcg total) by mouth daily, Disp: 90 tablet, Rfl: 0    Fluticasone-Salmeterol (Advair Diskus) 250-50 mcg/dose inhaler, Inhale 1 puff 2 (two) times a day Rinse mouth after use., Disp: 60 blister, Rfl: 2    Lancets (OneTouch Delica Plus [id number])  MISC, TEST TWO TIMES A DAY, Disp: 100 each, Rfl: 5    levofloxacin (LEVAQUIN) 500 mg tablet, , Disp: , Rfl:     lisinopril (ZESTRIL) 5 mg tablet, Take 1 tablet (5 mg total) by mouth daily, Disp: 90 tablet, Rfl: 1    metFORMIN (GLUCOPHAGE) 1000 MG tablet, Take 1 tablet (1,000 mg total) by mouth 2 (two) times a day with meals, Disp: 180 tablet, Rfl: 0    Misc. Devices (Carex Coccyx Cushion) MISC, Use in the morning, Disp: 1 each, Rfl: 0    naloxone (NARCAN) 4 mg/0.1 mL nasal spray, Administer 1 spray into a nostril. If no response after 2-3 minutes, give another dose in the other nostril using a new spray., Disp: 1 each, Rfl: 1    nicotine (NICODERM CQ) 14 mg/24hr TD 24 hr patch, Place 1 patch on the skin over 24 hours daily, Disp: 28 patch, Rfl: 0    Omega-3 Fatty Acids (FISH OIL PO), 1 cap oral daily, Disp: , Rfl:     pantoprazole (PROTONIX) 40 mg tablet, Take 1 tablet (40 mg total) by mouth daily, Disp: 90 tablet, Rfl: 0    polyethylene glycol (MIRALAX) 17 g packet, Take 17 g by mouth daily, Disp: , Rfl:     pregabalin (LYRICA) 200 MG capsule, TAKE ONE CAPSULE BY MOUTH THREE TIMES A DAY, Disp: 90 capsule, Rfl: 0    rivaroxaban (Xarelto) 2.5 mg tablet, Take 1 tablet (2.5 mg total) by mouth 2 (two) times a day, Disp: 60 tablet, Rfl: 0  No current facility-administered medications for this visit.  Family History   Problem Relation Age of Onset    COPD Mother     Emphysema Mother     COPD Father     Emphysema Father       Review of Systems   All other systems reviewed and are negative.    Allergies:  Paroxetine, Adhesive [medical tape], Carafate [sucralfate], Sulfa antibiotics, and Sulfamethoxazole-trimethoprim      Objective:  /74   Pulse 76   Temp (!) 96.6 °F (35.9 °C)   Resp 20     Physical Exam  Vitals reviewed.   Feet:      Right foot:      Skin integrity: Ulcer present.      Comments: Right posterior ankle ulcer appears stable with granular and mild fibrotic wound base.  New onset of distal ankle ulcer  "re-occurrence.  Postdebridement the ulcer was granular and bleeding.  No pain with palpation.  No malodor no clinical concerns of infection present at this time.            Wound 02/14/24 Groin Right (Active)       Wound 02/28/24 Diabetic Ulcer Ankle Posterior;Right (Active)   Wound Image   06/04/24 1502   Wound Description Pale;Pink;Yellow;Slough;Epithelialization;Granulation tissue;Hypergranulation 06/04/24 1512   Ani-wound Assessment Pink;Dry;Scaly;Scar Tissue 06/04/24 1512   Wound Length (cm) 5.5 cm 06/04/24 1512   Wound Width (cm) 1 cm 06/04/24 1512   Wound Depth (cm) 0.1 cm 06/04/24 1512   Wound Surface Area (cm^2) 5.5 cm^2 06/04/24 1512   Wound Volume (cm^3) 0.55 cm^3 06/04/24 1512   Calculated Wound Volume (cm^3) 0.55 cm^3 06/04/24 1512   Change in Wound Size % 88.54 06/04/24 1512   Drainage Amount Moderate 06/04/24 1512   Drainage Description Serosanguineous;Brown 06/04/24 1512   Non-staged Wound Description Full thickness 06/04/24 1512   Treatments Cleansed 06/04/24 1512   Wound packed? No 05/02/24 0844   Dressing Changed Changed 05/02/24 0844   Patient Tolerance Tolerated well 06/04/24 1512   Dressing Status Intact 06/04/24 1512                         Skin Substitute    Universal Protocol:  Consent: Verbal consent obtained.  Risks and benefits: risks, benefits and alternatives were discussed  Consent given by: patient  Time out: Immediately prior to procedure a \"time out\" was called to verify the correct patient, procedure, equipment, support staff and site/side marked as required.  Patient understanding: patient states understanding of the procedure being performed  Patient identity confirmed: verbally with patient  Performed by: Physician  Product: Grafix PL.    Application Location and Measurements  Location: trunk / arms/ legs  Skin Sub Lot #: [id number]  Skin Sub Expiration: 12/16/2024  Area (sq cm): 3  Product Applied (sq cm): 3  Product Wasted (sq cm): 0    Application Details  Fenestrated: " No  Secured: Yes  Secured With: Steri-Strips  Dressing Applied: Yes  Dressing Comments: Adaptic and bulky DSD  Procedural pain (0-10): insensate  Post-procedural pain: insensate   Response to treatment: procedure was tolerated well     NSS  NSS Lot #: [id number]   NSS Expiration: 12/31/2026   Comments: graft was prepared, applied and affixed per  directions. JOSE was within healing range. Pt is nonsmoker. Multilayer compression/bolster wrap was placed. Wound debridement was performed prior to graft application as a courtesy to the patient. E & M of contributory conditions and other factors have been addressed. The pt appears compliant with the wound care treatment plan. Pt was instructed on post procedure care. No infection, tendon, muscle, bone, or joint capsule involvement visualized.         Results from last 6 Months   Lab Units 02/14/24  1539   WOUND CULTURE  1+ Growth of Klebsiella pneumoniae*  1+ Growth of Proteus vulgaris group*  Few Colonies of         Wound Instructions:  Orders Placed This Encounter   Procedures    Wound cleansing and dressings Diabetic Ulcer Posterior;Right Ankle     Grafix PL applied today to the ulcer in the [facility]--Dressing to remain in place for 1 week, [facility]      Hold  [facility] services for 1 week and will re evaluate after patient is seen by Dr. Gann next week        Off-loading Instructions:  Keep weight and pressure off wound at all times  Wear off-loading device as directed by your physician----Prevalon Boot to the Right Foot     Follow up with Dr Gann  at the [facility] in  1 week     Standing Status:   Future     Standing Expiration Date:   6/11/2024    Skin Substitute     This order was created via procedure documentation    Wound Procedure Treatment Diabetic Ulcer Posterior;Right Ankle     This order was created via procedure documentation         Jennifer Gann DPM      Portions of the record may have been created with voice  "recognition software. Occasional wrong word or \"sound a like\" substitutions may have occurred due to the inherent limitations of voice recognition software. Read the chart carefully and recognize, using context, where substitutions have occurred.      "

## 2024-06-05 ENCOUNTER — HOME CARE VISIT (OUTPATIENT)
Dept: HOME HEALTH SERVICES | Facility: HOME HEALTHCARE | Age: 79
End: 2024-06-05
Payer: COMMERCIAL

## 2024-06-08 ENCOUNTER — APPOINTMENT (OUTPATIENT)
Dept: LAB | Facility: CLINIC | Age: 79
End: 2024-06-08
Payer: COMMERCIAL

## 2024-06-08 ENCOUNTER — RA CDI HCC (OUTPATIENT)
Dept: OTHER | Facility: HOSPITAL | Age: 79
End: 2024-06-08

## 2024-06-08 ENCOUNTER — HOME CARE VISIT (OUTPATIENT)
Dept: HOME HEALTH SERVICES | Facility: HOME HEALTHCARE | Age: 79
End: 2024-06-08
Payer: COMMERCIAL

## 2024-06-08 DIAGNOSIS — E11.42 TYPE 2 DIABETES MELLITUS WITH DIABETIC POLYNEUROPATHY, WITHOUT LONG-TERM CURRENT USE OF INSULIN (HCC): Chronic | ICD-10-CM

## 2024-06-08 LAB
ALBUMIN SERPL BCP-MCNC: 3.8 G/DL (ref 3.5–5)
ALP SERPL-CCNC: 71 U/L (ref 34–104)
ALT SERPL W P-5'-P-CCNC: 7 U/L (ref 7–52)
ANION GAP SERPL CALCULATED.3IONS-SCNC: 12 MMOL/L (ref 4–13)
AST SERPL W P-5'-P-CCNC: 14 U/L (ref 13–39)
BILIRUB SERPL-MCNC: 0.18 MG/DL (ref 0.2–1)
BUN SERPL-MCNC: 13 MG/DL (ref 5–25)
CALCIUM SERPL-MCNC: 9.8 MG/DL (ref 8.4–10.2)
CHLORIDE SERPL-SCNC: 101 MMOL/L (ref 96–108)
CHOLEST SERPL-MCNC: 139 MG/DL
CO2 SERPL-SCNC: 28 MMOL/L (ref 21–32)
CREAT SERPL-MCNC: 0.47 MG/DL (ref 0.6–1.3)
CREAT UR-MCNC: 37.4 MG/DL
GFR SERPL CREATININE-BSD FRML MDRD: 95 ML/MIN/1.73SQ M
GLUCOSE P FAST SERPL-MCNC: 148 MG/DL (ref 65–99)
HDLC SERPL-MCNC: 55 MG/DL
LDLC SERPL CALC-MCNC: 57 MG/DL (ref 0–100)
MICROALBUMIN UR-MCNC: <7 MG/L
MICROALBUMIN/CREAT 24H UR: <19 MG/G CREATININE (ref 0–30)
POTASSIUM SERPL-SCNC: 4.9 MMOL/L (ref 3.5–5.3)
PROT SERPL-MCNC: 6.3 G/DL (ref 6.4–8.4)
SODIUM SERPL-SCNC: 141 MMOL/L (ref 135–147)
TRIGL SERPL-MCNC: 135 MG/DL
TSH SERPL DL<=0.05 MIU/L-ACNC: 3.42 UIU/ML (ref 0.45–4.5)

## 2024-06-08 PROCEDURE — 36415 COLL VENOUS BLD VENIPUNCTURE: CPT

## 2024-06-08 PROCEDURE — 80053 COMPREHEN METABOLIC PANEL: CPT

## 2024-06-08 PROCEDURE — 84443 ASSAY THYROID STIM HORMONE: CPT

## 2024-06-08 PROCEDURE — 82570 ASSAY OF URINE CREATININE: CPT

## 2024-06-08 PROCEDURE — 80061 LIPID PANEL: CPT

## 2024-06-08 PROCEDURE — 83036 HEMOGLOBIN GLYCOSYLATED A1C: CPT

## 2024-06-08 PROCEDURE — 82043 UR ALBUMIN QUANTITATIVE: CPT

## 2024-06-09 LAB
EST. AVERAGE GLUCOSE BLD GHB EST-MCNC: 171 MG/DL
HBA1C MFR BLD: 7.6 %

## 2024-06-11 ENCOUNTER — HOME CARE VISIT (OUTPATIENT)
Dept: HOME HEALTH SERVICES | Facility: HOME HEALTHCARE | Age: 79
End: 2024-06-11
Payer: COMMERCIAL

## 2024-06-11 ENCOUNTER — OFFICE VISIT (OUTPATIENT)
Facility: HOSPITAL | Age: 79
End: 2024-06-11
Payer: COMMERCIAL

## 2024-06-11 VITALS
SYSTOLIC BLOOD PRESSURE: 148 MMHG | TEMPERATURE: 97.5 F | DIASTOLIC BLOOD PRESSURE: 60 MMHG | HEART RATE: 78 BPM | RESPIRATION RATE: 20 BRPM

## 2024-06-11 DIAGNOSIS — L97.302 DIABETIC ULCER OF ANKLE WITH FAT LAYER EXPOSED (HCC): Primary | ICD-10-CM

## 2024-06-11 DIAGNOSIS — I73.9 PAD (PERIPHERAL ARTERY DISEASE) (HCC): ICD-10-CM

## 2024-06-11 DIAGNOSIS — E11.42 TYPE 2 DIABETES MELLITUS WITH DIABETIC POLYNEUROPATHY, WITHOUT LONG-TERM CURRENT USE OF INSULIN (HCC): Chronic | ICD-10-CM

## 2024-06-11 DIAGNOSIS — E11.622 DIABETIC ULCER OF ANKLE WITH FAT LAYER EXPOSED (HCC): Primary | ICD-10-CM

## 2024-06-11 PROCEDURE — 11042 DBRDMT SUBQ TIS 1ST 20SQCM/<: CPT | Performed by: STUDENT IN AN ORGANIZED HEALTH CARE EDUCATION/TRAINING PROGRAM

## 2024-06-11 NOTE — PATIENT INSTRUCTIONS
Orders Placed This Encounter   Procedures    Wound Procedure Treatment Diabetic Ulcer Posterior;Right Ankle     This order was created via procedure documentation    Wound cleansing and dressings Diabetic Ulcer Posterior;Right Ankle     Wash your hands with soap and water.  Remove old dressing, discard into plastic bag and place in trash.  Cleanse the wound with Normal Saline  prior to applying a clean dressing. Do not use tissue or cotton balls. Do not scrub the wound. Pat dry using gauze.  Shower no     Apply Silver Alginate to the right ankle wound.  Cover with ABD pad.   Secure with mitch and tape. Apply Bulky dressin rolls cast padding, kerlex, and coban.      Spandagrip F RLE  Elastic Tubular Stocking  Tubular elastic bandage: Apply from base of toes to behind the knee. Apply in AM, may remove for sleep.  Avoid prolonged standing in one place.  Elevate leg(s) above the level of the heart when sitting or as much as possible.     Change dressing 3 times per week    Off-loading Instructions:  Keep weight and pressure off wound at all times  Wear off-loading device as directed by your physician----Prevalon Boot to the Right Foot when sitting, resting, and elevating lower extremity     Follow up at [facility] in 1 week    May ambulate for essential activities of daily living ONLY.     [facility] for wound care and dressing changes     Standing Status:   Future     Standing Expiration Date:   2024

## 2024-06-11 NOTE — PROGRESS NOTES
Wound Procedure Treatment Diabetic Ulcer Posterior;Right Ankle    Performed by: Lona Jarvis RN  Authorized by: Jennifer Gann DPM    Associated wounds:   Wound 02/28/24 Diabetic Ulcer Ankle Posterior;Right  Wound cleansed with:  NSS  Applied primary dressing:  Silver and Calcium alginate  Applied secondary dressing:  ABD and Cast padding  Dressing secured with:  Alicia, Tape, Kerlix, Elastic tubular stocking, Coban and Size F    Bulky dressing

## 2024-06-11 NOTE — PROGRESS NOTES
Patient ID: Lona Cedeno is a 78 y.o. female Date of Birth 1945     Diagnosis:  1. 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  -Weight-bear as tolerated with surgical shoe for essential activities only.  Patient to continue monitoring the area for any rubbing from shoes where the ulcer is located.  -Discussed importance of strict offloading to the wound area  -Discussed importance of glycemic control proper protein intake  -Patient was educated on clinical signs of wound infection if present she is to call my office immediately  -Return in 1 week     Chief Complaint   Patient presents with    Follow Up Wound Care Visit     Right ankle wound            Subjective:   78-year-old female with past medical history as detailed below presents for continued wound treatment to the right lower extremity.  No other complaints.        The following portions of the patient's history were reviewed and updated as appropriate:   Patient Active Problem List   Diagnosis    Type 2 diabetes mellitus with diabetic polyneuropathy (HCC)    Essential hypertension    Anxiety and depression    GERD without esophagitis    Urinary incontinence    Degenerative lumbar disc    Lactic acidosis    Hypomagnesemia    Bladder neoplasm    Left renal mass    PAD (peripheral artery disease) (HCC)    Abnormal CT of the chest    COPD, severity to be determined ([facility])    Tobacco use disorder    Age-related osteoporosis without current pathological fracture    Ankle  ulcer, right, with fat layer exposed (HCC)    Diabetic foot ulcer with osteomyelitis (HCC)    Preoperative cardiovascular examination    Constipation    Hematoma    Anemia    At risk for venous thromboembolism (VTE)    At high risk for skin breakdown    Wound of skin    Impaired mobility and activities of daily living    Leukocytosis    Surgical site infection    Sepsis without acute organ dysfunction (HCC)    Other dietary vitamin B12 deficiency anemia    Sacral wound    Acute respiratory failure with hypoxia (HCC)    Malignant neoplasm of renal pelvis, unspecified laterality (HCC)    Contusion of head, sequela    Chest wall pain     Past Medical History:   Diagnosis Date    Anxiety     Closed fracture of coccyx (HCC) 2023    Diabetes mellitus (HCC)     GERD (gastroesophageal reflux disease)     Hyperlipidemia     Hypertension     IBS (irritable bowel syndrome)     Shoulder fracture      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;  Surgeon: Cristian Child MD;  Location: [facility];  Service: Urology    EVACUATION OF HEMATOMA Right 2024    Procedure: EVACUATION/ DRAINAGE CHEST WALL  HEMATOMA;  Surgeon: Seth Hoyos MD;  Location: [facility];  Service: Vascular    FL RETROGRADE PYELOGRAM  2023    HERNIA REPAIR      umbilicus w/ mesh    MS BYPASS W/VEIN AXILLARY-FEMORAL Right 2024    Procedure: BYPASS AXILLO-FEMORAL, RIGHT WITH 8MM RINGED PTFE GRAFT;  Surgeon: Seth Hoyos MD;  Location: [facility];   Service: Vascular    SPLIT THICKNESS SKIN GRAFT Right 2/28/2024    Procedure: SKIN GRAFT SPLIT THICKNESS (STSG)  EXTREMITY, Wound vac dressing change;  Surgeon: Rigo Yeboah DPM;  Location: [facility];  Service: Podiatry    WOUND DEBRIDEMENT Right 2/4/2024    Procedure: RIGHT WOUND AND ACHILLES DEBRIDEMENT FOOT/TOE (WASH OUT); PARTIAL AMPUTATION DISTAL 2ND TOE;  Surgeon: Aaron Montero DPM;  [facility]: [facility];  Service: Podiatry    WOUND DEBRIDEMENT Right 2/14/2024    Procedure: DEBRIDEMENT RIGHT GROIN  WOUND AND WASHOUT, APPLICATION OF WOUND VAC;  Surgeon: Suly Muro DO;  [facility];  Service: Vascular    WOUND DEBRIDEMENT Right 2/19/2024    Procedure: DEBRIDEMENT LOWER EXTREMITY, washout;  Surgeon: Suly Muro DO;  [facility];  Service: Vascular     Social History     Socioeconomic History    Marital status: /Civil Union     Spouse name: None    Number of children: None    Years of education: None    Highest education level: None   Occupational History    None   Tobacco Use    Smoking status: Every Day     Current packs/day: 0.25     Average packs/day: 1 pack/day for 63.4 years (63.1 ttl pk-yrs)     Types: Cigarettes     Start date: 1961    Smokeless tobacco: Never    Tobacco comments:     Quit January 31, 2024   Vaping Use    Vaping status: Never Used   Substance and Sexual Activity    Alcohol use: Not Currently    Drug use: Never    Sexual activity: Not Currently   Other Topics Concern    None   Social History Narrative    None     Social Determinants of Health     Financial Resource Strain: Low Risk  (2/9/2023)    Overall Financial Resource Strain (CARDIA)     Difficulty of Paying Living Expenses: Not hard at all   Food Insecurity: No Food Insecurity (4/18/2024)    Hunger Vital Sign     Worried About Running Out of Food in the Last Year: Never true     Ran Out of Food in the Last Year: Never true   Transportation Needs: No Transportation Needs (4/18/2024)     PRAPARE - Transportation     Lack of Transportation (Medical): No     Lack of Transportation (Non-Medical): No   Physical Activity: Not on file   Stress: Not on file   Social Connections: Not on file   Intimate Partner Violence: Not on file   Housing Stability: Low Risk  (4/18/2024)    Housing Stability Vital Sign     Unable to Pay for Housing in the Last Year: No     Number of Times Moved in the Last Year: 1     Homeless in the Last Year: No        Current Outpatient Medications:     acetaminophen (TYLENOL) 325 mg tablet, Take 3 tablets (975 mg total) by mouth every 8 (eight) hours, Disp: , Rfl:     acetaminophen-codeine (TYLENOL with CODEINE #3) 300-30 MG per tablet, Take 1 tablet by mouth every 4 (four) hours as needed for moderate pain (Patient not taking: Reported on 5/17/2024), Disp: 30 tablet, Rfl: 0    albuterol (Ventolin HFA) 90 mcg/act inhaler, Inhale 2 puffs every 6 (six) hours as needed for wheezing, Disp: 18 g, Rfl: 3    Apoaequorin 20 MG CAPS, Take 20 mg by mouth in the morning, Disp: 90 capsule, Rfl: 0    Aspirin (ASPIR-81 PO), take one by mouth daily, Disp: , Rfl:     atorvastatin (LIPITOR) 10 mg tablet, Take 1 tablet (10 mg total) by mouth daily with dinner, Disp: 90 tablet, Rfl: 0    Blood Glucose Monitoring Suppl (OneTouch Verio) w/Device KIT, Use 2 (two) times a day, Disp: 1 kit, Rfl: 0    buPROPion (WELLBUTRIN XL) 300 mg 24 hr tablet, Take 1 tablet (300 mg total) by mouth daily, Disp: 90 tablet, Rfl: 0    clonazePAM (KlonoPIN) 1 mg tablet, Take 1 tablet (1 mg total) by mouth every evening, Disp: 30 tablet, Rfl: 0    cyanocobalamin (VITAMIN B-12) 1000 MCG tablet, Take 1 tablet (1,000 mcg total) by mouth daily, Disp: 90 tablet, Rfl: 0    Fluticasone-Salmeterol (Advair Diskus) 250-50 mcg/dose inhaler, Inhale 1 puff 2 (two) times a day Rinse mouth after use., Disp: 60 blister, Rfl: 2    Lancets (OneTouch Delica Plus [id number]) MISC, TEST TWO TIMES A DAY, Disp: 100 each, Rfl: 5    levofloxacin  (LEVAQUIN) 500 mg tablet, , Disp: , Rfl:     lisinopril (ZESTRIL) 5 mg tablet, Take 1 tablet (5 mg total) by mouth daily, Disp: 90 tablet, Rfl: 1    metFORMIN (GLUCOPHAGE) 1000 MG tablet, Take 1 tablet (1,000 mg total) by mouth 2 (two) times a day with meals, Disp: 180 tablet, Rfl: 0    Misc. Devices (Carex Coccyx Cushion) MISC, Use in the morning, Disp: 1 each, Rfl: 0    naloxone (NARCAN) 4 mg/0.1 mL nasal spray, Administer 1 spray into a nostril. If no response after 2-3 minutes, give another dose in the other nostril using a new spray., Disp: 1 each, Rfl: 1    nicotine (NICODERM CQ) 14 mg/24hr TD 24 hr patch, Place 1 patch on the skin over 24 hours daily, Disp: 28 patch, Rfl: 0    Omega-3 Fatty Acids (FISH OIL PO), 1 cap oral daily, Disp: , Rfl:     pantoprazole (PROTONIX) 40 mg tablet, Take 1 tablet (40 mg total) by mouth daily, Disp: 90 tablet, Rfl: 0    polyethylene glycol (MIRALAX) 17 g packet, Take 17 g by mouth daily, Disp: , Rfl:     pregabalin (LYRICA) 200 MG capsule, TAKE ONE CAPSULE BY MOUTH THREE TIMES A DAY, Disp: 90 capsule, Rfl: 0    rivaroxaban (Xarelto) 2.5 mg tablet, Take 1 tablet (2.5 mg total) by mouth 2 (two) times a day, Disp: 60 tablet, Rfl: 0  Family History   Problem Relation Age of Onset    COPD Mother     Emphysema Mother     COPD Father     Emphysema Father       Review of Systems   All other systems reviewed and are negative.    Allergies:  Paroxetine, Adhesive [medical tape], Carafate [sucralfate], Sulfa antibiotics, and Sulfamethoxazole-trimethoprim      Objective:  /60   Pulse 78   Temp 97.5 °F (36.4 °C)   Resp 20     Physical Exam  Vitals reviewed.   Feet:      Right foot:      Skin integrity: Ulcer present.      Comments: Right posterior ankle ulcer probes to subcutaneous tissue with significant improvement noted in the depth and the size since last evaluated.  Will hold off on skin graft this week.  Postdebridement 100% granular tissue.            Wound 02/14/24 Groin  "Right (Active)       Wound 02/28/24 Diabetic Ulcer Ankle Posterior;Right (Active)   Wound Image   06/04/24 1502   Wound Description Pale;Pink;Epithelialization;Hypergranulation 06/11/24 1503   Ani-wound Assessment Pink;Dry;Scaly;Scar Tissue 06/11/24 1503   Wound Length (cm) 5.5 cm 06/11/24 1503   Wound Width (cm) 0.8 cm 06/11/24 1503   Wound Depth (cm) 0.1 cm 06/11/24 1503   Wound Surface Area (cm^2) 4.4 cm^2 06/11/24 1503   Wound Volume (cm^3) 0.44 cm^3 06/11/24 1503   Calculated Wound Volume (cm^3) 0.44 cm^3 06/11/24 1503   Change in Wound Size % 90.83 06/11/24 1503   Drainage Amount Moderate 06/11/24 1503   Drainage Description Serosanguineous;Brown;Yellow 06/11/24 1503   Non-staged Wound Description Full thickness 06/11/24 1503   Treatments Cleansed 06/11/24 1503   Wound packed? No 05/02/24 0844   Dressing Changed Changed 05/02/24 0844   Patient Tolerance Tolerated well 06/11/24 1503   Dressing Status Intact 06/11/24 1503                         Debridement   Wound 02/28/24 Diabetic Ulcer Ankle Posterior;Right    Universal Protocol:  Consent: Verbal consent obtained.  Risks and benefits: risks, benefits and alternatives were discussed  Consent given by: patient  Time out: Immediately prior to procedure a \"time out\" was called to verify the correct patient, procedure, equipment, support staff and site/side marked as required.  Patient understanding: patient states understanding of the procedure being performed  Patient identity confirmed: verbally with patient    Debridement Details  Performed by: physician  Debridement type: surgical  Level of debridement: subcutaneous tissue  Pain control: lidocaine 4%      Post-debridement measurements  Length (cm): 5.5  Width (cm): 0.8  Depth (cm): 0.2  Percent debrided: 25%  Surface Area (cm^2): 4.4  Area Debrided (cm^2): 1.1  Volume (cm^3): 0.88    Tissue and other material debrided: subcutaneous tissue  Devitalized tissue debrided: biofilm, exudate, fibrin and " rachel  Instrument(s) utilized: curette  Technique utilized: excisionalBleeding: small  Hemostasis obtained with: pressure  Procedural pain (0-10): insensate  Post-procedural pain: insensate   Response to treatment: procedure was tolerated well         Results from last 6 Months   Lab Units 24  1539   WOUND CULTURE  1+ Growth of Klebsiella pneumoniae*  1+ Growth of Proteus vulgaris group*  Few Colonies of         Wound Instructions:  Orders Placed This Encounter   Procedures    Wound Procedure Treatment Diabetic Ulcer Posterior;Right Ankle     This order was created via procedure documentation    Wound cleansing and dressings Diabetic Ulcer Posterior;Right Ankle     Wash your hands with soap and water.  Remove old dressing, discard into plastic bag and place in trash.  Cleanse the wound with Normal Saline  prior to applying a clean dressing. Do not use tissue or cotton balls. Do not scrub the wound. Pat dry using gauze.  Shower no     Apply Silver Alginate to the right ankle wound.  Cover with ABD pad.   Secure with mitch and tape. Apply Bulky dressin rolls cast padding, kerlex, and coban.      Spandagrip F RLE  Elastic Tubular Stocking  Tubular elastic bandage: Apply from base of toes to behind the knee. Apply in AM, may remove for sleep.  Avoid prolonged standing in one place.  Elevate leg(s) above the level of the heart when sitting or as much as possible.     Change dressing 3 times per week    Off-loading Instructions:  Keep weight and pressure off wound at all times  Wear off-loading device as directed by your physician----Prevalon Boot to the Right Foot when sitting, resting, and elevating lower extremity     Follow up at [facility] in 1 week    May ambulate for essential activities of daily living ONLY.     [facility] for wound care and dressing changes     Standing Status:   Future     Standing Expiration Date:   2024         Jennifer Gann DPM      Portions of the record may have been created  "with voice recognition software. Occasional wrong word or \"sound a like\" substitutions may have occurred due to the inherent limitations of voice recognition software. Read the chart carefully and recognize, using context, where substitutions have occurred.      "

## 2024-06-12 RX ORDER — PREGABALIN 200 MG/1
CAPSULE ORAL
Qty: 90 CAPSULE | Refills: 0 | Status: SHIPPED | OUTPATIENT
Start: 2024-06-12

## 2024-06-12 RX ORDER — RIVAROXABAN 2.5 MG/1
2.5 TABLET, FILM COATED ORAL 2 TIMES DAILY
Qty: 60 TABLET | Refills: 0 | Status: SHIPPED | OUTPATIENT
Start: 2024-06-12

## 2024-06-13 ENCOUNTER — HOME CARE VISIT (OUTPATIENT)
Dept: HOME HEALTH SERVICES | Facility: HOME HEALTHCARE | Age: 79
End: 2024-06-13
Payer: COMMERCIAL

## 2024-06-13 ENCOUNTER — RA CDI HCC (OUTPATIENT)
Dept: OTHER | Facility: HOSPITAL | Age: 79
End: 2024-06-13

## 2024-06-13 VITALS
DIASTOLIC BLOOD PRESSURE: 80 MMHG | OXYGEN SATURATION: 91 % | HEART RATE: 80 BPM | TEMPERATURE: 97.7 F | RESPIRATION RATE: 20 BRPM | SYSTOLIC BLOOD PRESSURE: 136 MMHG

## 2024-06-13 PROCEDURE — G0299 HHS/HOSPICE OF RN EA 15 MIN: HCPCS

## 2024-06-15 ENCOUNTER — HOME CARE VISIT (OUTPATIENT)
Dept: HOME HEALTH SERVICES | Facility: HOME HEALTHCARE | Age: 79
End: 2024-06-15
Payer: COMMERCIAL

## 2024-06-15 VITALS
SYSTOLIC BLOOD PRESSURE: 120 MMHG | RESPIRATION RATE: 18 BRPM | OXYGEN SATURATION: 92 % | HEART RATE: 79 BPM | TEMPERATURE: 97.6 F | DIASTOLIC BLOOD PRESSURE: 62 MMHG

## 2024-06-15 PROCEDURE — G0299 HHS/HOSPICE OF RN EA 15 MIN: HCPCS

## 2024-06-15 NOTE — CASE COMMUNICATION
Kunal Owen,    Patient is scheduled for you to be seen on 6/18. I just want to report that a new wound opening is noted to her old  (healed) donor site on right pretibial. I attached photo on her chart. She stated that it opened d/t to rubbing it on her spandagrip. I applied Ca alginate and DCD today.     Thank you!

## 2024-06-18 ENCOUNTER — OFFICE VISIT (OUTPATIENT)
Facility: HOSPITAL | Age: 79
End: 2024-06-18
Payer: COMMERCIAL

## 2024-06-18 VITALS
SYSTOLIC BLOOD PRESSURE: 122 MMHG | TEMPERATURE: 97.9 F | DIASTOLIC BLOOD PRESSURE: 64 MMHG | HEART RATE: 82 BPM | RESPIRATION RATE: 18 BRPM

## 2024-06-18 DIAGNOSIS — S81.801A OPEN WOUND OF RIGHT LOWER LEG, INITIAL ENCOUNTER: ICD-10-CM

## 2024-06-18 DIAGNOSIS — L97.302 DIABETIC ULCER OF ANKLE WITH FAT LAYER EXPOSED (HCC): Primary | ICD-10-CM

## 2024-06-18 DIAGNOSIS — I73.9 PAD (PERIPHERAL ARTERY DISEASE) (HCC): ICD-10-CM

## 2024-06-18 DIAGNOSIS — L85.3 XEROSIS OF SKIN: ICD-10-CM

## 2024-06-18 DIAGNOSIS — E11.622 DIABETIC ULCER OF ANKLE WITH FAT LAYER EXPOSED (HCC): Primary | ICD-10-CM

## 2024-06-18 DIAGNOSIS — Z72.0 TOBACCO USE: ICD-10-CM

## 2024-06-18 DIAGNOSIS — Z94.5 HISTORY OF SKIN GRAFT: ICD-10-CM

## 2024-06-18 PROCEDURE — 97597 DBRDMT OPN WND 1ST 20 CM/<: CPT | Performed by: STUDENT IN AN ORGANIZED HEALTH CARE EDUCATION/TRAINING PROGRAM

## 2024-06-18 PROCEDURE — 99204 OFFICE O/P NEW MOD 45 MIN: CPT | Performed by: STUDENT IN AN ORGANIZED HEALTH CARE EDUCATION/TRAINING PROGRAM

## 2024-06-18 RX ORDER — AMMONIUM LACTATE 12 G/100G
LOTION TOPICAL DAILY
Qty: 225 G | Refills: 0 | Status: SHIPPED | OUTPATIENT
Start: 2024-06-18 | End: 2024-07-18

## 2024-06-18 RX ORDER — LIDOCAINE 40 MG/G
CREAM TOPICAL ONCE
Status: COMPLETED | OUTPATIENT
Start: 2024-06-18 | End: 2024-06-18

## 2024-06-18 RX ADMIN — LIDOCAINE 1 APPLICATION: 40 CREAM TOPICAL at 15:23

## 2024-06-18 NOTE — PROGRESS NOTES
Wound Procedure Treatment    Performed by: Maxine Cristobal LPN  Authorized by: Lindsey Owen PA-C    Associated wounds:   Wound 02/28/24 Diabetic Ulcer Ankle Posterior;Right  Wound 06/15/24 Pretibial Right  Wound cleansed with:  NSS  Applied Topical: Other    Applied secondary dressing:  Gauze  Dressing secured with:  Alicia  dermagran

## 2024-06-18 NOTE — PATIENT INSTRUCTIONS
Orders Placed This Encounter   Procedures    Wound cleansing and dressings     rders Placed This Encounter  Procedures  · Wound Procedure Treatment Diabetic Ulcer Posterior;Right Ankle     This order was created via procedure documentation  · Wound cleansing and dressings Diabetic Ulcer Posterior;Right Ankle     Wash your hands with soap and water.  Remove old dressing, discard into plastic bag and place in trash.  Cleanse the wound with Normal Saline  prior to applying a clean dressing. Do not use tissue or cotton balls. Do not scrub the wound. Pat dry using gauze.  Shower no      Apply dermagran to both wounds.posterior and pretib. Cover with ABD pad.   Secure with mitch and tape. .     Spandagrip F RLE  Elastic Tubular Stocking  Tubular elastic bandage: Apply from base of toes to behind the knee. Apply in AM, may remove for sleep.  Avoid prolonged standing in one place.  Elevate leg(s) above the level of the heart when sitting or as much as possible.      Change dressing every other day     Off-loading Instructions:  Keep weight and pressure off wound at all times  Wear off-loading device as directed by your physician----Prevalon Boot to the Right Foot when sitting, resting, and elevating lower extremity     Standing Status:   Future     Standing Expiration Date:   6/25/2024    No orders of the defined types were placed in this encounter.

## 2024-06-18 NOTE — PROGRESS NOTES
Patient ID: Lona Cedeno is a 78 y.o. female Date of Birth 1945       Chief Complaint   Patient presents with    Follow Up Wound Care Visit     Diabetic wound       Allergies:  Paroxetine, Adhesive [medical tape], Carafate [sucralfate], Sulfa antibiotics, and Sulfamethoxazole-trimethoprim    Diagnosis:   Diagnosis ICD-10-CM Associated Orders   1. Diabetic ulcer of ankle with fat layer exposed ([facility])  E11.622 Wound cleansing and dressings    L97.302 lidocaine (LMX) 4 % cream     Wound Procedure Treatment     Debridement Diabetic Ulcer Posterior;Right Ankle      2. Open wound of right lower leg, initial encounter  S81.801A       3. Xerosis of skin  L85.3 ammonium lactate (LAC-HYDRIN) 12 % lotion      4. History of skin graft  Z94.5       5. Tobacco use  Z72.0       6. PAD (peripheral artery disease) ([facility])  I73.9            Assessment  & Plan:    F/u DFU of the R posterior ankle overlying areas of [place]'s tendon. There is dried crusted drainage present overlying prior ulcerations. Post debridement there is only one ulcer remaining with fibrogranular ulcer base and small serous drainage. No periwound erythema to indicate acute infection.   Selective debridement, as below.   Dermagran to the ulceration. Change every other day.    A1C results reviewed with the patient today. Elevated at 7.6 as of ten days ago.   Obtain 3-4 servings of protein daily for wound healing.   Attempt to reduce tobacco intake as much as possible.   Continue with Prevalon boot for offloading when resting. Limit ambulation for additional week to avoid friction/pressure on the site of the ulcer. Would benefit from podiatry evaluation once healed to obtain custom diabetic footwear.   Instructed to monitor for any changes including redness or swelling surrounding the wound, increased drainage or pain as well as fevers or chills.    May use lac hydrin on dry scaling intact skin.   New open wound of the RLE at site of previous skin donor site.  Partial thickness ulcer with granulation tissue and minimal serous drainage. Periwound with hyperpigmented scar tissue. No diffuse erythema to indicate acute soft tissue infection.   Wound mechanically debrided with saline moistened gauze.   Dermagran to wound bed. Change every other day. Keep covered.   F/u in one week. Instructed to call if any questions or concerns arise in meantime.            Subjective:   06/18/24: Pt presents for continued care of DFU on her R posterior ankle overlying Achilles tendon. Since previous visit she has had a new wound that opened at the site of her prior skin graft donor site that was attributed to rubbing of the Spandagrip. Currently silver alginate is being used on both wounds. Pt reports she is trying to reduce her tobacco intake but continues to smoke about 1 PPD. Has been reducing pressure on her foot with a Prevalon boot while resting.           The following portions of the patient's history were reviewed and updated as appropriate:   Patient Active Problem List   Diagnosis    Type 2 diabetes mellitus with diabetic polyneuropathy (HCC)    Essential hypertension    Anxiety and depression    GERD without esophagitis    Urinary incontinence    Degenerative lumbar disc    Lactic acidosis    Hypomagnesemia    Bladder neoplasm    Left renal mass    PAD (peripheral artery disease) (HCC)    Abnormal CT of the chest    COPD, severity to be determined (HCC)    Tobacco use disorder    Age-related osteoporosis without current pathological fracture    Ankle ulcer, right, with fat layer exposed (HCC)    Diabetic foot ulcer with osteomyelitis (HCC)    Preoperative cardiovascular examination    Constipation    Hematoma    Anemia    At risk for venous thromboembolism (VTE)    At high risk for skin breakdown    Wound of skin    Impaired mobility and activities of daily living    Leukocytosis    Surgical site infection    Sepsis without acute organ dysfunction (HCC)    Other dietary vitamin  B12 deficiency anemia    Sacral wound    Acute respiratory failure with hypoxia (HCC)    Malignant neoplasm of renal pelvis, unspecified laterality (HCC)    Contusion of head, sequela    Chest wall pain     Past Medical History:   Diagnosis Date    Anxiety     Closed fracture of coccyx (HCC) 2023    COPD (chronic obstructive pulmonary disease) (HCC)     Diabetes (HCC)     Diabetes mellitus (HCC)     GERD (gastroesophageal reflux disease)     Hyperlipidemia     Hypertension     IBS (irritable bowel syndrome)     Peripheral arterial disease (HCC)     Shoulder fracture     Tobacco use      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;  Surgeon: Cristian Child MD;  Location: [facility];  Service: Urology    EVACUATION OF HEMATOMA Right 2024    Procedure: EVACUATION/ DRAINAGE CHEST WALL  HEMATOMA;  Surgeon: Seth Hoyos MD;  Location: [facility];  Service: Vascular    FL RETROGRADE PYELOGRAM  2023    HERNIA REPAIR      umbilicus w/ mesh    DC BYPASS W/VEIN AXILLARY-FEMORAL Right 2024    Procedure: BYPASS AXILLO-FEMORAL, RIGHT WITH 8MM RINGED PTFE GRAFT;  Surgeon: Seth Hoyos MD;  Location: [facility];  Service: Vascular    SPLIT THICKNESS SKIN GRAFT Right 2024    Procedure: SKIN GRAFT SPLIT THICKNESS (STSG)  EXTREMITY, Wound vac dressing change;  Surgeon: Rigo Yeboah DPM;  Location: [facility];  Service: Podiatry    WOUND DEBRIDEMENT Right 2024    Procedure: RIGHT WOUND AND ACHILLES  DEBRIDEMENT FOOT/TOE (WASH OUT); PARTIAL AMPUTATION DISTAL 2ND TOE;  Surgeon: Aaron Montero DPM;  Location: [facility];  Service: Podiatry    WOUND DEBRIDEMENT Right 2/14/2024    Procedure: DEBRIDEMENT RIGHT GROIN  WOUND AND WASHOUT, APPLICATION OF WOUND VAC;  Surgeon: Suly Muro DO;  Location: [facility];  Service: Vascular    WOUND DEBRIDEMENT Right 2/19/2024    Procedure: DEBRIDEMENT LOWER EXTREMITY, washout;  Surgeon: Suly Muro DO;  Location: [facility];  Service: Vascular     Family History   Problem Relation Age of Onset    COPD Mother     Emphysema Mother     COPD Father     Emphysema Father      Social History     Socioeconomic History    Marital status: /Civil Union     Spouse name: None    Number of children: None    Years of education: None    Highest education level: None   Occupational History    None   Tobacco Use    Smoking status: Every Day     Current packs/day: 0.25     Average packs/day: 1 pack/day for 63.5 years (63.1 ttl pk-yrs)     Types: Cigarettes     Start date: 1961    Smokeless tobacco: Never    Tobacco comments:     Quit January 31, 2024   Vaping Use    Vaping status: Never Used   Substance and Sexual Activity    Alcohol use: Not Currently    Drug use: Never    Sexual activity: Not Currently   Other Topics Concern    None   Social History Narrative    None     Social Determinants of Health     Financial Resource Strain: Low Risk  (2/9/2023)    Overall Financial Resource Strain (CARDIA)     Difficulty of Paying Living Expenses: Not hard at all   Food Insecurity: No Food Insecurity (4/18/2024)    Hunger Vital Sign     Worried About Running Out of Food in the Last Year: Never true     Ran Out of Food in the Last Year: Never true   Transportation Needs: No Transportation Needs (4/18/2024)    PRAPARE - Transportation     Lack of Transportation (Medical): No     Lack of Transportation (Non-Medical): No   Physical Activity: Not on file   Stress: Not on file   Social  Connections: Not on file   Intimate Partner Violence: Not on file   Housing Stability: Low Risk  (4/18/2024)    Housing Stability Vital Sign     Unable to Pay for Housing in the Last Year: No     Number of Times Moved in the Last Year: 1     Homeless in the Last Year: No       Current Outpatient Medications:     ammonium lactate (LAC-HYDRIN) 12 % lotion, Apply topically daily To dry scaling skin, Disp: 225 g, Rfl: 0    acetaminophen (TYLENOL) 325 mg tablet, Take 3 tablets (975 mg total) by mouth every 8 (eight) hours, Disp: , Rfl:     acetaminophen-codeine (TYLENOL with CODEINE #3) 300-30 MG per tablet, Take 1 tablet by mouth every 4 (four) hours as needed for moderate pain (Patient not taking: Reported on 5/17/2024), Disp: 30 tablet, Rfl: 0    albuterol (Ventolin HFA) 90 mcg/act inhaler, Inhale 2 puffs every 6 (six) hours as needed for wheezing, Disp: 18 g, Rfl: 3    Apoaequorin 20 MG CAPS, Take 20 mg by mouth in the morning, Disp: 90 capsule, Rfl: 0    Aspirin (ASPIR-81 PO), take one by mouth daily, Disp: , Rfl:     atorvastatin (LIPITOR) 10 mg tablet, Take 1 tablet (10 mg total) by mouth daily with dinner, Disp: 90 tablet, Rfl: 0    Blood Glucose Monitoring Suppl (OneTouch Verio) w/Device KIT, Use 2 (two) times a day, Disp: 1 kit, Rfl: 0    buPROPion (WELLBUTRIN XL) 300 mg 24 hr tablet, Take 1 tablet (300 mg total) by mouth daily, Disp: 90 tablet, Rfl: 0    clonazePAM (KlonoPIN) 1 mg tablet, Take 1 tablet (1 mg total) by mouth every evening, Disp: 30 tablet, Rfl: 0    cyanocobalamin (VITAMIN B-12) 1000 MCG tablet, Take 1 tablet (1,000 mcg total) by mouth daily, Disp: 90 tablet, Rfl: 0    Fluticasone-Salmeterol (Advair Diskus) 250-50 mcg/dose inhaler, Inhale 1 puff 2 (two) times a day Rinse mouth after use., Disp: 60 blister, Rfl: 2    Lancets (OneTouch Delica Plus Egghco17C) MISC, TEST TWO TIMES A DAY, Disp: 100 each, Rfl: 5    levofloxacin (LEVAQUIN) 500 mg tablet, , Disp: , Rfl:     lisinopril (ZESTRIL) 5 mg  tablet, Take 1 tablet (5 mg total) by mouth daily, Disp: 90 tablet, Rfl: 1    metFORMIN (GLUCOPHAGE) 1000 MG tablet, Take 1 tablet (1,000 mg total) by mouth 2 (two) times a day with meals, Disp: 180 tablet, Rfl: 0    Misc. Devices (Carex Coccyx Cushion) MISC, Use in the morning, Disp: 1 each, Rfl: 0    naloxone (NARCAN) 4 mg/0.1 mL nasal spray, Administer 1 spray into a nostril. If no response after 2-3 minutes, give another dose in the other nostril using a new spray., Disp: 1 each, Rfl: 1    nicotine (NICODERM CQ) 14 mg/24hr TD 24 hr patch, Place 1 patch on the skin over 24 hours daily, Disp: 28 patch, Rfl: 0    Omega-3 Fatty Acids (FISH OIL PO), 1 cap oral daily, Disp: , Rfl:     pantoprazole (PROTONIX) 40 mg tablet, Take 1 tablet (40 mg total) by mouth daily, Disp: 90 tablet, Rfl: 0    polyethylene glycol (MIRALAX) 17 g packet, Take 17 g by mouth daily, Disp: , Rfl:     pregabalin (LYRICA) 200 MG capsule, TAKE ONE CAPSULE BY MOUTH THREE TIMES A DAY, Disp: 90 capsule, Rfl: 0    Xarelto 2.5 MG tablet, TAKE ONE TABLET BY MOUTH TWICE A DAY, Disp: 60 tablet, Rfl: 0  No current facility-administered medications for this visit.    Review of Systems   Skin:  Positive for wound.         Objective:  /64   Pulse 82   Temp 97.9 °F (36.6 °C) (Temporal)   Resp 18   Pain Score: 0-No pain     Physical Exam  Vitals reviewed.   Constitutional:       Appearance: She is overweight.   Cardiovascular:      Pulses:           Dorsalis pedis pulses are 1+ on the right side.        Posterior tibial pulses are 1+ on the right side.   Skin:     Findings: Wound present. No erythema.             Comments: FU of the R posterior ankle overlying areas of [place]'s tendon. There is dried crusted drainage present overlying prior ulcerations. Post debridement there is only one ulcer remaining with fibrogranular ulcer base and small serous drainage. No periwound erythema to indicate acute infection.     Open wound of the RLE at site of  previous skin donor site. Partial thickness ulcer with granulation tissue and minimal serous drainage. Periwound with hyperpigmented scar tissue. No diffuse erythema to indicate acute soft tissue infection.        Neurological:      Mental Status: She is alert.           Wound 02/14/24 Groin Right (Active)       Wound 02/28/24 Diabetic Ulcer Ankle Posterior;Right (Active)   Wound Image   06/18/24 1514   Wound Description Pale;Pink;Epithelialization 06/18/24 1515   Ani-wound Assessment Pink;Dry;Scaly;Scar Tissue 06/18/24 1515   Wound Length (cm) 0.1 cm 06/18/24 1515   Wound Width (cm) 0.1 cm 06/18/24 1515   Wound Depth (cm) 0.1 cm 06/18/24 1515   Wound Surface Area (cm^2) 0.01 cm^2 06/18/24 1515   Wound Volume (cm^3) 0.001 cm^3 06/18/24 1515   Calculated Wound Volume (cm^3) 0 cm^3 06/18/24 1515   Change in Wound Size % 100 06/18/24 1515   Drainage Amount None 06/18/24 1515   Drainage Description Serosanguineous;Brown;Yellow 06/11/24 1503   Non-staged Wound Description Full thickness 06/18/24 1515   Treatments Cleansed 06/18/24 1515   Wound packed? No 05/02/24 0844   Dressing Changed Changed 06/18/24 1515   Patient Tolerance Tolerated well 06/18/24 1515   Dressing Status Intact 06/18/24 1515       Wound 06/15/24 Pretibial Right (Active)   Wound Image   06/18/24 1513   Wound Description Granulation tissue;Epithelialization 06/18/24 1516   Ani-wound Assessment Dry 06/18/24 1516   Wound Length (cm) 0.5 cm 06/18/24 1516   Wound Width (cm) 0.8 cm 06/18/24 1516   Wound Depth (cm) 0.1 cm 06/18/24 1516   Wound Surface Area (cm^2) 0.4 cm^2 06/18/24 1516   Wound Volume (cm^3) 0.04 cm^3 06/18/24 1516   Calculated Wound Volume (cm^3) 0.04 cm^3 06/18/24 1516   Drainage Amount Small 06/18/24 1516   Drainage Description Serosanguineous 06/18/24 1516   Non-staged Wound Description Full thickness 06/18/24 1516   Treatments Cleansed 06/18/24 1516   Patient Tolerance Tolerated well 06/18/24 1516   Dressing Status Dry 06/18/24 1516  "                        Debridement   Wound 02/28/24 Diabetic Ulcer Ankle Posterior;Right    Universal Protocol:  Consent: Verbal consent obtained.  Consent given by: patient  Time out: Immediately prior to procedure a \"time out\" was called to verify the correct patient, procedure, equipment, support staff and site/side marked as required.  Patient understanding: patient states understanding of the procedure being performed  Patient identity confirmed: verbally with patient    Debridement Details  Performed by: PA  Debridement type: selective  Pain control: lidocaine 4%      Post-debridement measurements  Length (cm): 0.3  Width (cm): 0.3  Depth (cm): 0.1  Percent debrided: 100%  Surface Area (cm^2): 0.09  Area Debrided (cm^2): 0.09  Volume (cm^3): 0.01    Devitalized tissue debrided: exudate  Instrument(s) utilized: curette  Bleeding: small  Hemostasis obtained with: pressure  Procedural pain (0-10): 0  Post-procedural pain: 0   Response to treatment: procedure was tolerated well         Results from last 6 Months   Lab Units 02/14/24  1539   WOUND CULTURE  1+ Growth of Klebsiella pneumoniae*  1+ Growth of Proteus vulgaris group*  Few Colonies of           Wound Instructions:  Orders Placed This Encounter   Procedures    Wound cleansing and dressings     rders Placed This Encounter  Procedures  · Wound Procedure Treatment Diabetic Ulcer Posterior;Right Ankle     This order was created via procedure documentation  · Wound cleansing and dressings Diabetic Ulcer Posterior;Right Ankle     Wash your hands with soap and water.  Remove old dressing, discard into plastic bag and place in trash.  Cleanse the wound with Normal Saline  prior to applying a clean dressing. Do not use tissue or cotton balls. Do not scrub the wound. Pat dry using gauze.  Shower no      Apply dermagran to both wounds.posterior and pretib. Cover with ABD pad.   Secure with mitch and tape. .     Spandagrip F RLE  Elastic Tubular Stocking  Tubular " "elastic bandage: Apply from base of toes to behind the knee. Apply in AM, may remove for sleep.  Avoid prolonged standing in one place.  Elevate leg(s) above the level of the heart when sitting or as much as possible.      Change dressing every other day     Off-loading Instructions:  Keep weight and pressure off wound at all times  Wear off-loading device as directed by your physician----Prevalon Boot to the Right Foot when sitting, resting, and elevating lower extremity     Standing Status:   Future     Standing Expiration Date:   6/25/2024    Wound Procedure Treatment     This order was created via procedure documentation    Debridement Diabetic Ulcer Posterior;Right Ankle     This order was created via procedure documentation       Cally Owen, PA-C      Portions of the record may have been created with voice recognition software. Occasional wrong word or \"sound alike\" substitutions may have occurred due to the inherent limitations of voice recognition software. Read the chart carefully and recognize, using context, where substitutions have occurred.      "

## 2024-06-19 ENCOUNTER — HOME CARE VISIT (OUTPATIENT)
Dept: HOME HEALTH SERVICES | Facility: HOME HEALTHCARE | Age: 79
End: 2024-06-19
Payer: COMMERCIAL

## 2024-06-19 NOTE — CASE COMMUNICATION
Ship to XX Pt. Home   Ordering MD (home health only) Blake Owen PAC    Wound 1 Right foot Full XX      Frequency QOD    All items are ordered by the each unless otherwise noted.  Orders should be for a 2 week period (unless noted by insurance)    [employer]  Dermagran [id number]       1

## 2024-06-20 ENCOUNTER — HOME CARE VISIT (OUTPATIENT)
Dept: HOME HEALTH SERVICES | Facility: HOME HEALTHCARE | Age: 79
End: 2024-06-20
Payer: COMMERCIAL

## 2024-06-20 ENCOUNTER — OFFICE VISIT (OUTPATIENT)
Dept: FAMILY MEDICINE CLINIC | Facility: CLINIC | Age: 79
End: 2024-06-20
Payer: COMMERCIAL

## 2024-06-20 VITALS
DIASTOLIC BLOOD PRESSURE: 60 MMHG | OXYGEN SATURATION: 92 % | SYSTOLIC BLOOD PRESSURE: 134 MMHG | RESPIRATION RATE: 20 BRPM | TEMPERATURE: 97.8 F | HEART RATE: 80 BPM

## 2024-06-20 VITALS
DIASTOLIC BLOOD PRESSURE: 53 MMHG | TEMPERATURE: 98.8 F | HEART RATE: 82 BPM | SYSTOLIC BLOOD PRESSURE: 109 MMHG | RESPIRATION RATE: 18 BRPM | OXYGEN SATURATION: 93 %

## 2024-06-20 DIAGNOSIS — L97.509 DIABETIC FOOT ULCER WITH OSTEOMYELITIS (HCC): ICD-10-CM

## 2024-06-20 DIAGNOSIS — E11.42 TYPE 2 DIABETES MELLITUS WITH DIABETIC POLYNEUROPATHY, WITHOUT LONG-TERM CURRENT USE OF INSULIN (HCC): Chronic | ICD-10-CM

## 2024-06-20 DIAGNOSIS — I10 ESSENTIAL HYPERTENSION: Primary | Chronic | ICD-10-CM

## 2024-06-20 DIAGNOSIS — I73.9 PAD (PERIPHERAL ARTERY DISEASE) (HCC): Chronic | ICD-10-CM

## 2024-06-20 DIAGNOSIS — M86.9 DIABETIC FOOT ULCER WITH OSTEOMYELITIS (HCC): ICD-10-CM

## 2024-06-20 DIAGNOSIS — K21.9 GERD WITHOUT ESOPHAGITIS: Chronic | ICD-10-CM

## 2024-06-20 DIAGNOSIS — F41.9 ANXIETY AND DEPRESSION: ICD-10-CM

## 2024-06-20 DIAGNOSIS — I73.9 PAD (PERIPHERAL ARTERY DISEASE) (HCC): ICD-10-CM

## 2024-06-20 DIAGNOSIS — E11.69 DIABETIC FOOT ULCER WITH OSTEOMYELITIS (HCC): ICD-10-CM

## 2024-06-20 DIAGNOSIS — K21.9 GASTROESOPHAGEAL REFLUX DISEASE WITHOUT ESOPHAGITIS: ICD-10-CM

## 2024-06-20 DIAGNOSIS — E11.621 DIABETIC FOOT ULCER WITH OSTEOMYELITIS (HCC): ICD-10-CM

## 2024-06-20 DIAGNOSIS — I10 ESSENTIAL HYPERTENSION: Chronic | ICD-10-CM

## 2024-06-20 DIAGNOSIS — J44.9 COPD, SEVERITY TO BE DETERMINED (HCC): Chronic | ICD-10-CM

## 2024-06-20 DIAGNOSIS — J44.9 COPD (CHRONIC OBSTRUCTIVE PULMONARY DISEASE) (HCC): ICD-10-CM

## 2024-06-20 DIAGNOSIS — D64.9 ANEMIA: ICD-10-CM

## 2024-06-20 DIAGNOSIS — F32.A ANXIETY AND DEPRESSION: ICD-10-CM

## 2024-06-20 DIAGNOSIS — M81.0 AGE-RELATED OSTEOPOROSIS WITHOUT CURRENT PATHOLOGICAL FRACTURE: ICD-10-CM

## 2024-06-20 DIAGNOSIS — R09.02 HYPOXIA: ICD-10-CM

## 2024-06-20 PROBLEM — C65.9: Status: RESOLVED | Noted: 2024-04-18 | Resolved: 2024-06-20

## 2024-06-20 PROCEDURE — G2211 COMPLEX E/M VISIT ADD ON: HCPCS | Performed by: FAMILY MEDICINE

## 2024-06-20 PROCEDURE — G0299 HHS/HOSPICE OF RN EA 15 MIN: HCPCS

## 2024-06-20 PROCEDURE — 99214 OFFICE O/P EST MOD 30 MIN: CPT | Performed by: FAMILY MEDICINE

## 2024-06-20 RX ORDER — AMILORIDE HYDROCHLORIDE 5 MG/1
TABLET ORAL
COMMUNITY
Start: 2024-06-04

## 2024-06-20 RX ORDER — POTASSIUM CHLORIDE 750 MG/1
TABLET, EXTENDED RELEASE ORAL
COMMUNITY
Start: 2024-06-04

## 2024-06-20 RX ORDER — BLOOD SUGAR DIAGNOSTIC
STRIP MISCELLANEOUS
COMMUNITY
Start: 2024-04-24

## 2024-06-20 NOTE — ASSESSMENT & PLAN NOTE
Patient is continuing with all medications unchanged remains with home wound care and nonweightbearing on right lower extremity

## 2024-06-20 NOTE — ASSESSMENT & PLAN NOTE
A1c is 7.6 continue all same medication regimen avoiding excess glucose avoiding concentrated sweets  Lab Results   Component Value Date    HGBA1C 7.6 (H) 06/08/2024

## 2024-06-20 NOTE — ASSESSMENT & PLAN NOTE
Stable on current treatment now with inhalers doing well no change at this time and follow-up in 4 months

## 2024-06-20 NOTE — PROGRESS NOTES
Falls Plan of Care: Balance, strength, and gait training instructions were provided.Assessment/Plan:       Problem List Items Addressed This Visit          Cardiovascular and Mediastinum    Essential hypertension - Primary (Chronic)     Hypertension under stable control continuing with same medications doing well avoiding sodium continue same medication regimen and follow-up with me as scheduled at the next office visit 4 months         Relevant Medications    AMILoride 5 mg tablet    PAD (peripheral artery disease) (HCC) (Chronic)     Patient is continuing with all medications unchanged remains with home wound care and nonweightbearing on right lower extremity            Respiratory    COPD, severity to be determined ([facility]) (Chronic)     Stable on current treatment now with inhalers doing well no change at this time and follow-up in 4 months            Digestive    GERD without esophagitis (Chronic)     GERD symptoms are stable continuing with same medication regimen pantoprazole and avoiding late night eating            Endocrine    Type 2 diabetes mellitus with diabetic polyneuropathy (HCC) (Chronic)     A1c is 7.6 continue all same medication regimen avoiding excess glucose avoiding concentrated sweets  Lab Results   Component Value Date    HGBA1C 7.6 (H) 06/08/2024            Relevant Orders    IRIS Diabetic eye exam    Albumin / creatinine urine ratio    Comprehensive metabolic panel    Hemoglobin A1C    Lipid Panel with Direct LDL reflex    TSH, 3rd generation with Free T4 reflex    Diabetic foot ulcer with osteomyelitis (HCC)     Pleating antibiotics continuing with wound care and followed up closely monitoring blood sugar I emphasized the need for close blood glucose monitoring during treatment plan patient remains nonweightbearing  Lab Results   Component Value Date    HGBA1C 7.6 (H) 06/08/2024                 Subjective:      Patient ID: Lona Cedeno is a 78 y.o. female.    Follow-up evaluation review lab  work and discuss ongoing treatment after foot surgery        The following portions of the patient's history were reviewed and updated as appropriate: allergies, current medications, past family history, past medical history, past social history, past surgical history and problem list.    Review of Systems   Constitutional:  Negative for chills and fever.   HENT:  Negative for ear pain and sore throat.    Eyes:  Negative for pain and visual disturbance.   Respiratory:  Negative for cough and shortness of breath.    Cardiovascular:  Negative for chest pain and palpitations.   Gastrointestinal:  Negative for abdominal pain and vomiting.   Genitourinary:  Negative for dysuria and hematuria.   Musculoskeletal:  Negative for arthralgias and back pain.   Skin:  Negative for color change and rash.   Neurological:  Negative for seizures and syncope.   All other systems reviewed and are negative.        Objective:      /53 (BP Location: Left arm, Patient Position: Sitting, Cuff Size: Standard)   Pulse 82   Temp 98.8 °F (37.1 °C) (Tympanic)   Resp 18   SpO2 93%        Physical Exam  Vitals and nursing note reviewed.   Constitutional:       General: She is not in acute distress.     Appearance: Normal appearance. She is well-developed.   HENT:      Head: Normocephalic and atraumatic.      Right Ear: Tympanic membrane, ear canal and external ear normal.      Left Ear: Tympanic membrane, ear canal and external ear normal.      Nose: Nose normal.      Mouth/Throat:      Mouth: Mucous membranes are moist.      Pharynx: Oropharynx is clear. No oropharyngeal exudate.   Eyes:      General: No scleral icterus.        Right eye: No discharge.         Left eye: No discharge.      Conjunctiva/sclera: Conjunctivae normal.      Pupils: Pupils are equal, round, and reactive to light.   Neck:      Thyroid: No thyromegaly.      Vascular: No JVD.   Cardiovascular:      Rate and Rhythm: Normal rate and regular rhythm.      Heart  sounds: Normal heart sounds. No murmur heard.  Pulmonary:      Effort: Pulmonary effort is normal.      Breath sounds: No wheezing or rales.   Chest:      Chest wall: No tenderness.   Abdominal:      General: Bowel sounds are normal. There is no distension.      Palpations: Abdomen is soft. There is no mass.      Tenderness: There is no abdominal tenderness.   Musculoskeletal:         General: No tenderness or deformity. Normal range of motion.      Cervical back: Normal range of motion.   Lymphadenopathy:      Cervical: No cervical adenopathy.   Skin:     General: Skin is warm and dry.      Findings: No rash.   Neurological:      General: No focal deficit present.      Mental Status: She is alert and oriented to person, place, and time.      Cranial Nerves: No cranial nerve deficit.      Coordination: Coordination normal.      Deep Tendon Reflexes: Reflexes are normal and symmetric. Reflexes normal.   Psychiatric:         Mood and Affect: Mood normal.         Behavior: Behavior normal.         Thought Content: Thought content normal.         Judgment: Judgment normal.          Data:    Laboratory Results: I have personally reviewed the pertinent laboratory results/reports   Radiology/Other Diagnostic Testing Results: I have personally reviewed pertinent reports.       Lab Results   Component Value Date    WBC 9.46 05/08/2024    HGB 13.5 05/08/2024    HCT 45.1 05/08/2024    MCV 99 (H) 05/08/2024     (H) 05/08/2024     Lab Results   Component Value Date     05/21/2018    K 4.9 06/08/2024     06/08/2024    CO2 28 06/08/2024    ANIONGAP 14.0 05/21/2018    BUN 13 06/08/2024    CREATININE 0.47 (L) 06/08/2024    GLUCOSE 143 (H) 01/31/2024    GLUF 148 (H) 06/08/2024    CALCIUM 9.8 06/08/2024    CORRECTEDCA 10.1 02/05/2024    AST 14 06/08/2024    ALT 7 06/08/2024    ALKPHOS 71 06/08/2024    PROT 6.4 05/21/2018    BILITOT 0.4 05/21/2018    EGFR 95 06/08/2024     Lab Results   Component Value Date     "CHOLESTEROL 139 06/08/2024    CHOLESTEROL 167 11/07/2023    CHOLESTEROL 138 07/05/2023     Lab Results   Component Value Date    HDL 55 06/08/2024    HDL 48 (L) 11/07/2023    HDL 40 (L) 07/05/2023     Lab Results   Component Value Date    LDLCALC 57 06/08/2024    LDLCALC 80 11/07/2023    LDLCALC 48 07/05/2023     Lab Results   Component Value Date    TRIG 135 06/08/2024    TRIG 193 (H) 11/07/2023    TRIG 248 (H) 07/05/2023     No results found for: \"CHOLHDL\"  Lab Results   Component Value Date    EDU5LQGCWEMS 3.415 06/08/2024     Lab Results   Component Value Date    HGBA1C 7.6 (H) 06/08/2024     No results found for: \"PSA\"    Vivek Preston, DO      "

## 2024-06-20 NOTE — ASSESSMENT & PLAN NOTE
GERD symptoms are stable continuing with same medication regimen pantoprazole and avoiding late night eating

## 2024-06-20 NOTE — ASSESSMENT & PLAN NOTE
-Likely related to increase stress and anxiety. Trial of trazodone. Pleating antibiotics continuing with wound care and followed up closely monitoring blood sugar I emphasized the need for close blood glucose monitoring during treatment plan patient remains nonweightbearing  Lab Results   Component Value Date    HGBA1C 7.6 (H) 06/08/2024      no

## 2024-06-20 NOTE — ASSESSMENT & PLAN NOTE
Hypertension under stable control continuing with same medications doing well avoiding sodium continue same medication regimen and follow-up with me as scheduled at the next office visit 4 months

## 2024-06-22 ENCOUNTER — HOME CARE VISIT (OUTPATIENT)
Dept: HOME HEALTH SERVICES | Facility: HOME HEALTHCARE | Age: 79
End: 2024-06-22
Payer: COMMERCIAL

## 2024-06-22 VITALS
RESPIRATION RATE: 18 BRPM | OXYGEN SATURATION: 93 % | HEART RATE: 78 BPM | SYSTOLIC BLOOD PRESSURE: 108 MMHG | DIASTOLIC BLOOD PRESSURE: 62 MMHG | TEMPERATURE: 97.6 F

## 2024-06-22 PROCEDURE — G0299 HHS/HOSPICE OF RN EA 15 MIN: HCPCS

## 2024-06-24 ENCOUNTER — TELEPHONE (OUTPATIENT)
Age: 79
End: 2024-06-24

## 2024-06-24 DIAGNOSIS — R32 URINARY INCONTINENCE, UNSPECIFIED TYPE: Primary | ICD-10-CM

## 2024-06-24 RX ORDER — PREGABALIN 200 MG/1
CAPSULE ORAL
Qty: 90 CAPSULE | Refills: 0 | Status: SHIPPED | OUTPATIENT
Start: 2024-06-24

## 2024-06-24 RX ORDER — SOLIFENACIN SUCCINATE 5 MG/1
5 TABLET, FILM COATED ORAL DAILY
Qty: 90 TABLET | Refills: 1 | Status: SHIPPED | OUTPATIENT
Start: 2024-06-24

## 2024-06-24 RX ORDER — ALBUTEROL SULFATE 90 UG/1
2 AEROSOL, METERED RESPIRATORY (INHALATION) EVERY 6 HOURS PRN
Qty: 18 G | Refills: 3 | Status: SHIPPED | OUTPATIENT
Start: 2024-06-24

## 2024-06-24 RX ORDER — LISINOPRIL 5 MG/1
5 TABLET ORAL DAILY
Qty: 90 TABLET | Refills: 1 | Status: SHIPPED | OUTPATIENT
Start: 2024-06-24

## 2024-06-24 RX ORDER — FLUTICASONE PROPIONATE AND SALMETEROL 250; 50 UG/1; UG/1
1 POWDER RESPIRATORY (INHALATION) 2 TIMES DAILY
Qty: 60 BLISTER | Refills: 2 | Status: SHIPPED | OUTPATIENT
Start: 2024-06-24 | End: 2024-09-22

## 2024-06-24 RX ORDER — ATORVASTATIN CALCIUM 10 MG/1
10 TABLET, FILM COATED ORAL
Qty: 90 TABLET | Refills: 0 | Status: SHIPPED | OUTPATIENT
Start: 2024-06-24

## 2024-06-24 RX ORDER — BUPROPION HYDROCHLORIDE 300 MG/1
300 TABLET ORAL DAILY
Qty: 90 TABLET | Refills: 0 | Status: SHIPPED | OUTPATIENT
Start: 2024-06-24

## 2024-06-24 RX ORDER — PANTOPRAZOLE SODIUM 40 MG/1
40 TABLET, DELAYED RELEASE ORAL DAILY
Qty: 90 TABLET | Refills: 0 | Status: SHIPPED | OUTPATIENT
Start: 2024-06-24

## 2024-06-24 RX ORDER — CLONAZEPAM 1 MG/1
1 TABLET ORAL EVERY EVENING
Qty: 30 TABLET | Refills: 0 | Status: SHIPPED | OUTPATIENT
Start: 2024-06-24

## 2024-06-24 NOTE — TELEPHONE ENCOUNTER
Patient called in stating Dr. Preston was going to prescribe ear drops for the wax in patient's ears.     Patient also stated she was taking Solifenacin before she went into hospital. Patient is requesting a prescription to be sent.     [id number]    Please advise.  Thank you

## 2024-06-24 NOTE — TELEPHONE ENCOUNTER
Notified patient. Patient said she has the Debrox at home already and will start using those. Notified about script sent in, patient acknowledged.

## 2024-06-25 ENCOUNTER — OFFICE VISIT (OUTPATIENT)
Facility: HOSPITAL | Age: 79
End: 2024-06-25
Payer: COMMERCIAL

## 2024-06-25 ENCOUNTER — HOME CARE VISIT (OUTPATIENT)
Dept: HOME HEALTH SERVICES | Facility: HOME HEALTHCARE | Age: 79
End: 2024-06-25
Payer: COMMERCIAL

## 2024-06-25 VITALS
TEMPERATURE: 96.6 F | SYSTOLIC BLOOD PRESSURE: 120 MMHG | RESPIRATION RATE: 20 BRPM | HEART RATE: 84 BPM | DIASTOLIC BLOOD PRESSURE: 62 MMHG

## 2024-06-25 DIAGNOSIS — L53.9 ERYTHEMA OF PERIWOUND SKIN: ICD-10-CM

## 2024-06-25 DIAGNOSIS — E11.622 DIABETIC ULCER OF ANKLE WITH FAT LAYER EXPOSED (HCC): Primary | ICD-10-CM

## 2024-06-25 DIAGNOSIS — S81.801A OPEN WOUND OF RIGHT LOWER LEG, INITIAL ENCOUNTER: ICD-10-CM

## 2024-06-25 DIAGNOSIS — L97.302 DIABETIC ULCER OF ANKLE WITH FAT LAYER EXPOSED (HCC): Primary | ICD-10-CM

## 2024-06-25 PROCEDURE — 99214 OFFICE O/P EST MOD 30 MIN: CPT | Performed by: STUDENT IN AN ORGANIZED HEALTH CARE EDUCATION/TRAINING PROGRAM

## 2024-06-25 PROCEDURE — 97597 DBRDMT OPN WND 1ST 20 CM/<: CPT | Performed by: STUDENT IN AN ORGANIZED HEALTH CARE EDUCATION/TRAINING PROGRAM

## 2024-06-25 RX ORDER — LIDOCAINE 40 MG/G
CREAM TOPICAL ONCE
Status: COMPLETED | OUTPATIENT
Start: 2024-06-25 | End: 2024-06-25

## 2024-06-25 RX ADMIN — LIDOCAINE 1 APPLICATION: 40 CREAM TOPICAL at 14:45

## 2024-06-25 NOTE — PATIENT INSTRUCTIONS
Orders Placed This Encounter   Procedures    Wound cleansing and dressings     Right LE and Right ankle wounds:    Wash your hands with soap and water.  Remove old dressing, discard into plastic bag and place in trash.  Cleanse the wound with Normal Saline prior to applying a clean dressing.   Shower no      Apply Lac Hydrin to the dry, scaly skin    Apply Mupirocin ointment to both wounds.  Cover with gauze, mitch and tape.   Change dressings every other day and as needed     Spandagrip Size F to the RLE  Elastic Tubular Stocking  Tubular elastic bandage: Apply from base of toes to behind the knee. Apply in AM, may remove for sleep.  Avoid prolonged standing in one place.  Elevate leg(s) above the level of the heart when sitting or as much as possible.           Off-loading Instructions:  Keep weight and pressure off wound at all times  Wear off-loading device as directed by your physician----Prevalon Boot to the Right Foot when sitting, resting, and elevating lower extremity     Standing Status:   Future     Standing Expiration Date:   7/2/2024

## 2024-06-25 NOTE — PROGRESS NOTES
Wound Procedure Treatment    Performed by: Katia Avalos RN  Authorized by: Lindsey Owen PA-C    Associated wounds:   Wound 02/28/24 Diabetic Ulcer Ankle Posterior;Right  Wound 06/15/24 Pretibial Right  Wound cleansed with:  NSS  Applied Topical: Mupirocin ointment    Applied secondary dressing:  Gauze  Dressing secured with:  Alicia, Tape, Elastic tubular stocking and Size F

## 2024-06-25 NOTE — PROGRESS NOTES
Patient ID: Lona Cedeno is a 78 y.o. female Date of Birth 1945       Chief Complaint   Patient presents with    Follow Up Wound Care Visit     Right ankle and right LE wounds       Allergies:  Paroxetine, Adhesive [medical tape], Carafate [sucralfate], Sulfa antibiotics, and Sulfamethoxazole-trimethoprim    Diagnosis:   Diagnosis ICD-10-CM Associated Orders   1. 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 Open wound of right lower leg, initial encounter  S81.801A lidocaine (LMX) 4 % cream     Wound cleansing and dressings     mupirocin (BACTROBAN) 2 % ointment     Wound Procedure Treatment      3. Erythema of periwound skin  L53.9 mupirocin (BACTROBAN) 2 % ointment           Assessment  & Plan:    F/u open wound of the RLE at site of previous skin donor site.  Is measuring smaller with new edge epithelization. The remaining open ulceration has healthy appearing granulation tissue present. Rim of dried skin that was removed with scissors. Periwound with mild erythema concerning for early soft tissue infection.   Mupirocin. Keep covered with a bandage.   F/u DFU of the R posterior ankle overlying areas of Cartacho's tendon. There is a hyperkeratotic build-up present. Post debridement there is a small area that remains open with granulation tissue and small serous drainage. No periwound erythema present. Does not probe to deeper structures.   Selective debridement, as below.   Lac Hydrin to intact scaling skin. Prescribed at previous visit.   Mupirocin to wound bed. Keep covered. Change daily.   Instructed to monitor for any changes including redness or swelling surrounding the wound, increased drainage or pain as well as fevers or chills.    When healed will discuss with podiatry regarding which shoes  would be best to keep wound from re-opening.   F/u in one week. Instructed to call if any questions or concerns arise in meantime.     Subjective:   06/18/24: Pt presents for continued care of DFU on her R posterior ankle overlying Achilles tendon. Since previous visit she has had a new wound that opened at the site of her prior skin graft donor site that was attributed to rubbing of the Spandagrip. Currently silver alginate is being used on both wounds. Pt reports she is trying to reduce her tobacco intake but continues to smoke about 1 PPD. Has been reducing pressure on her foot with a Prevalon boot while resting.     06/25/24: Pt presents for f/u wounds on her R posterior ankle and skin graft donor site. Currently she is using Dermagran to both wounds. Has been walking with a Prevalon boot around the house. Is limiting weight bearing currently. Continues to smoke and does not feel as though she will be able to quit again.           The following portions of the patient's history were reviewed and updated as appropriate:   Patient Active Problem List   Diagnosis    Type 2 diabetes mellitus with diabetic polyneuropathy (HCC)    Essential hypertension    Anxiety and depression    GERD without esophagitis    Urinary incontinence    Degenerative lumbar disc    Lactic acidosis    Hypomagnesemia    Bladder neoplasm    Left renal mass    PAD (peripheral artery disease) (HCC)    Abnormal CT of the chest    COPD, severity to be determined (HCC)    Tobacco use disorder    Age-related osteoporosis without current pathological fracture    Ankle ulcer, right, with fat layer exposed (HCC)    Diabetic foot ulcer with osteomyelitis ([facility])    Preoperative cardiovascular examination    Constipation    Hematoma    Anemia    At risk for venous thromboembolism (VTE)    At high risk for skin breakdown    Wound of skin    Impaired mobility and activities of daily living    Leukocytosis    Surgical site infection    Sepsis without acute  organ dysfunction (HCC)    Other dietary vitamin B12 deficiency anemia    Sacral wound    Acute respiratory failure with hypoxia (HCC)    Contusion of head, sequela    Chest wall pain     Past Medical History:   Diagnosis Date    Anxiety     Closed fracture of coccyx (HCC) 2023    COPD (chronic obstructive pulmonary disease) (HCC)     Diabetes (HCC)     Diabetes mellitus (HCC)     GERD (gastroesophageal reflux disease)     Hyperlipidemia     Hypertension     IBS (irritable bowel syndrome)     Peripheral arterial disease (HCC)     Shoulder fracture     Tobacco use      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;  Surgeon: Cristian Child MD;  Location: [facility];  Service: Urology    EVACUATION OF HEMATOMA Right 2024    Procedure: EVACUATION/ DRAINAGE CHEST WALL  HEMATOMA;  Surgeon: Seth Hoyos MD;  Location: [facility];  Service: Vascular    FL RETROGRADE PYELOGRAM  2023    HERNIA REPAIR      umbilicus w/ mesh    NM BYPASS W/VEIN AXILLARY-FEMORAL Right 2024    Procedure: BYPASS AXILLO-FEMORAL, RIGHT WITH 8MM RINGED PTFE GRAFT;  Surgeon: Seth Hoyos MD;  Location: [facility];  Service: Vascular    SPLIT THICKNESS SKIN GRAFT Right 2024    Procedure: SKIN GRAFT SPLIT THICKNESS (STSG)  EXTREMITY, Wound vac dressing change;  Surgeon: Rigo Yeboah DPM;  Location: [facility];  Service: Podiatry    WOUND DEBRIDEMENT Right 2024    Procedure: RIGHT WOUND AND ACHILLES DEBRIDEMENT FOOT/TOE  (WASH OUT); PARTIAL AMPUTATION DISTAL 2ND TOE;  Surgeon: Aaron Montero DPM;  Location: [facility];  Service: Podiatry    WOUND DEBRIDEMENT Right 2/14/2024    Procedure: DEBRIDEMENT RIGHT GROIN  WOUND AND WASHOUT, APPLICATION OF WOUND VAC;  Surgeon: Suly Muro DO;  [facility]: [facility];  Service: Vascular    WOUND DEBRIDEMENT Right 2/19/2024    Procedure: DEBRIDEMENT LOWER EXTREMITY, washout;  Surgeon: Suly Muro DO;  [facility];  Service: Vascular     Family History   Problem Relation Age of Onset    COPD Mother     Emphysema Mother     COPD Father     Emphysema Father      Social History     Socioeconomic History    Marital status: /Civil Union     Spouse name: None    Number of children: None    Years of education: None    Highest education level: None   Occupational History    None   Tobacco Use    Smoking status: Every Day     Current packs/day: 0.25     Average packs/day: 1 pack/day for 63.5 years (63.1 ttl pk-yrs)     Types: Cigarettes     Start date: 1961    Smokeless tobacco: Never    Tobacco comments:     Quit January 31, 2024   Vaping Use    Vaping status: Never Used   Substance and Sexual Activity    Alcohol use: Not Currently    Drug use: Never    Sexual activity: Not Currently   Other Topics Concern    None   Social History Narrative    None     Social Determinants of Health     Financial Resource Strain: Low Risk  (2/9/2023)    Overall Financial Resource Strain (CARDIA)     Difficulty of Paying Living Expenses: Not hard at all   Food Insecurity: No Food Insecurity (4/18/2024)    Hunger Vital Sign     Worried About Running Out of Food in the Last Year: Never true     Ran Out of Food in the Last Year: Never true   Transportation Needs: No Transportation Needs (4/18/2024)    PRAPARE - Transportation     Lack of Transportation (Medical): No     Lack of Transportation (Non-Medical): No   Physical Activity: Not on file   Stress: Not on file   Social Connections: Unknown  (6/18/2024)    Received from [facility]     How often do you feel lonely or isolated from those around you? (Adult - for ages 18 years and over): Not on file   Intimate Partner Violence: Not on file   Housing Stability: Low Risk  (4/18/2024)    Housing Stability Vital Sign     Unable to Pay for Housing in the Last Year: No     Number of Times Moved in the Last Year: 1     Homeless in the Last Year: No       Current Outpatient Medications:     mupirocin (BACTROBAN) 2 % ointment, Apply topically daily for 7 days To open wounds of RLE, Disp: 22 g, Rfl: 1    acetaminophen (TYLENOL) 325 mg tablet, Take 3 tablets (975 mg total) by mouth every 8 (eight) hours, Disp: , Rfl:     acetaminophen-codeine (TYLENOL with CODEINE #3) 300-30 MG per tablet, Take 1 tablet by mouth every 4 (four) hours as needed for moderate pain (Patient not taking: Reported on 5/17/2024), Disp: 30 tablet, Rfl: 0    albuterol (Ventolin HFA) 90 mcg/act inhaler, Inhale 2 puffs every 6 (six) hours as needed for wheezing, Disp: 18 g, Rfl: 3    AMILoride 5 mg tablet, , Disp: , Rfl:     ammonium lactate (LAC-HYDRIN) 12 % lotion, Apply topically daily To dry scaling skin (Patient not taking: Reported on 6/20/2024), Disp: 225 g, Rfl: 0    Apoaequorin 20 MG CAPS, Take 20 mg by mouth in the morning, Disp: 90 capsule, Rfl: 0    Aspirin (ASPIR-81 PO), take one by mouth daily, Disp: , Rfl:     atorvastatin (LIPITOR) 10 mg tablet, Take 1 tablet (10 mg total) by mouth daily with dinner, Disp: 90 tablet, Rfl: 0    Blood Glucose Monitoring Suppl (OneTouch Verio) w/Device KIT, Use 2 (two) times a day, Disp: 1 kit, Rfl: 0    buPROPion (WELLBUTRIN XL) 300 mg 24 hr tablet, Take 1 tablet (300 mg total) by mouth daily, Disp: 90 tablet, Rfl: 0    clonazePAM (KlonoPIN) 1 mg tablet, Take 1 tablet (1 mg total) by mouth every evening, Disp: 30 tablet, Rfl: 0    cyanocobalamin (VITAMIN B-12) 1000 MCG tablet, Take 1 tablet (1,000 mcg total) by mouth daily,  Disp: 90 tablet, Rfl: 0    Fluticasone-Salmeterol (Advair Diskus) 250-50 mcg/dose inhaler, Inhale 1 puff 2 (two) times a day Rinse mouth after use., Disp: 60 blister, Rfl: 2    Lancets (OneTouch Delica Plus Dbeulq16M) MISC, TEST TWO TIMES A DAY, Disp: 100 each, Rfl: 5    levofloxacin (LEVAQUIN) 500 mg tablet, , Disp: , Rfl:     lisinopril (ZESTRIL) 5 mg tablet, Take 1 tablet (5 mg total) by mouth daily, Disp: 90 tablet, Rfl: 1    metFORMIN (GLUCOPHAGE) 1000 MG tablet, Take 1 tablet (1,000 mg total) by mouth 2 (two) times a day with meals, Disp: 180 tablet, Rfl: 0    Misc. Devices (Carex Coccyx Cushion) MISC, Use in the morning, Disp: 1 each, Rfl: 0    naloxone (NARCAN) 4 mg/0.1 mL nasal spray, Administer 1 spray into a nostril. If no response after 2-3 minutes, give another dose in the other nostril using a new spray. (Patient not taking: Reported on 6/20/2024), Disp: 1 each, Rfl: 1    nicotine (NICODERM CQ) 14 mg/24hr TD 24 hr patch, Place 1 patch on the skin over 24 hours daily (Patient not taking: Reported on 6/20/2024), Disp: 28 patch, Rfl: 0    Omega-3 Fatty Acids (FISH OIL PO), 1 cap oral daily (Patient not taking: Reported on 6/20/2024), Disp: , Rfl:     OneTouch Verio test strip, , Disp: , Rfl:     pantoprazole (PROTONIX) 40 mg tablet, Take 1 tablet (40 mg total) by mouth daily, Disp: 90 tablet, Rfl: 0    polyethylene glycol (MIRALAX) 17 g packet, Take 17 g by mouth daily (Patient not taking: Reported on 6/20/2024), Disp: , Rfl:     potassium chloride (Klor-Con M10) 10 mEq tablet, , Disp: , Rfl:     pregabalin (LYRICA) 200 MG capsule, TAKE ONE CAPSULE BY MOUTH THREE TIMES A DAY, Disp: 90 capsule, Rfl: 0    rivaroxaban (Xarelto) 2.5 mg tablet, Take 1 tablet (2.5 mg total) by mouth 2 (two) times a day, Disp: 180 tablet, Rfl: 0    solifenacin (VESICARE) 5 mg tablet, Take 1 tablet (5 mg total) by mouth daily, Disp: 90 tablet, Rfl: 1  No current facility-administered medications for this visit.    Review of  Systems   Constitutional:  Negative for fever.   Skin:  Positive for wound.   Psychiatric/Behavioral:  Negative for agitation.          Objective:  /62   Pulse 84   Temp (!) 96.6 °F (35.9 °C)   Resp 20   Pain Score: 0-No pain     Physical Exam  Vitals reviewed.   Constitutional:       Appearance: She is overweight.   Cardiovascular:      Pulses:           Dorsalis pedis pulses are 1+ on the right side.        Posterior tibial pulses are 1+ on the right side.   Skin:     Findings: Wound present. No erythema.             Comments: Open wound of the RLE at site of previous skin donor site.  Is measuring smaller with new edge epithelization. The remaining open ulceration has healthy appearing granulation tissue present. Rim of dried skin that was removed with scissors. Periwound with mild erythema concerning for early soft tissue infection.       DFU of the R posterior ankle overlying areas of Catracho's tendon. There is a hyperkeratotic build-up present. Post debridement there is a small area that remains open with granulation tissue and small serous drainage. No periwound erythema present. Does not probe to deeper structures.      Neurological:      Mental Status: She is alert.           Wound 02/14/24 Groin Right (Active)       Wound 02/28/24 Diabetic Ulcer Ankle Posterior;Right (Active)   Wound Image   06/25/24 1436   Wound Description Dry;Other (Comment) 06/25/24 1444   Ani-wound Assessment Pink;Dry;Scaly;Scar Tissue 06/25/24 1444   Wound Length (cm) 6.5 cm 06/25/24 1444   Wound Width (cm) 1 cm 06/25/24 1444   Wound Depth (cm) 0.1 cm 06/25/24 1444   Wound Surface Area (cm^2) 6.5 cm^2 06/25/24 1444   Wound Volume (cm^3) 0.65 cm^3 06/25/24 1444   Calculated Wound Volume (cm^3) 0.65 cm^3 06/25/24 1444   Change in Wound Size % 86.46 06/25/24 1444   Drainage Amount Scant 06/25/24 1444   Drainage Description Serous 06/25/24 1444   Non-staged Wound Description Partial thickness 06/25/24 1444   Treatments Cleansed  "06/18/24 1515   Wound packed? No 05/02/24 0844   Dressing Changed Changed 06/18/24 1515   Patient Tolerance Tolerated well 06/18/24 1515   Dressing Status Intact 06/25/24 1444       Wound 06/15/24 Pretibial Right (Active)   Wound Image   06/25/24 1435   Wound Description Granulation tissue 06/25/24 1443   Ani-wound Assessment Dry;Scaly;Scar Tissue;Pink 06/25/24 1443   Wound Length (cm) 0.5 cm 06/25/24 1443   Wound Width (cm) 0.6 cm 06/25/24 1443   Wound Depth (cm) 0.1 cm 06/25/24 1443   Wound Surface Area (cm^2) 0.3 cm^2 06/25/24 1443   Wound Volume (cm^3) 0.03 cm^3 06/25/24 1443   Calculated Wound Volume (cm^3) 0.03 cm^3 06/25/24 1443   Drainage Amount Scant 06/25/24 1443   Drainage Description Serosanguineous 06/25/24 1443   Non-staged Wound Description Full thickness 06/25/24 1443   Treatments Cleansed 06/18/24 1516   Patient Tolerance Tolerated well 06/18/24 1516   Dressing Status Intact 06/25/24 1443              Debridement   Wound 02/28/24 Diabetic Ulcer Ankle Posterior;Right    Universal Protocol:  Consent: Verbal consent obtained.  Consent given by: patient  Time out: Immediately prior to procedure a \"time out\" was called to verify the correct patient, procedure, equipment, support staff and site/side marked as required.  Patient understanding: patient states understanding of the procedure being performed  Patient identity confirmed: verbally with patient    Debridement Details  Performed by: PA  Debridement type: selective  Pain control: lidocaine 4%      Post-debridement measurements  Length (cm): 0.3  Width (cm): 0.3  Depth (cm): 0.1  Percent debrided: 100%  Surface Area (cm^2): 0.09  Area Debrided (cm^2): 0.09  Volume (cm^3): 0.01    Devitalized tissue debrided: hyperkeratotic build-up overlying ulceration  Instrument(s) utilized: curette  Bleeding: small  Hemostasis obtained with: pressure  Procedural pain (0-10): 0  Post-procedural pain: 0   Response to treatment: procedure was tolerated well     " "    Results from last 6 Months   Lab Units 02/14/24  1539   WOUND CULTURE  1+ Growth of Klebsiella pneumoniae*  1+ Growth of Proteus vulgaris group*  Few Colonies of           Wound Instructions:  Orders Placed This Encounter   Procedures    Wound cleansing and dressings     Right LE and Right ankle wounds:    Wash your hands with soap and water.  Remove old dressing, discard into plastic bag and place in trash.  Cleanse the wound with Normal Saline prior to applying a clean dressing.   Shower no      Apply Lac Hydrin to the dry, scaly skin    Apply Mupirocin ointment to both wounds.  Cover with gauze, mitch and tape.   Change dressings every other day and as needed     Spandagrip Size F to the RLE  Elastic Tubular Stocking  Tubular elastic bandage: Apply from base of toes to behind the knee. Apply in AM, may remove for sleep.  Avoid prolonged standing in one place.  Elevate leg(s) above the level of the heart when sitting or as much as possible.           Off-loading Instructions:  Keep weight and pressure off wound at all times  Wear off-loading device as directed by your physician----Prevalon Boot to the Right Foot when sitting, resting, and elevating lower extremity     Standing Status:   Future     Standing Expiration Date:   7/2/2024    Wound Procedure Treatment     This order was created via procedure documentation    Debridement Diabetic Ulcer Posterior;Right Ankle     This order was created via procedure documentation       Cally Owen, PA-C    Portions of the record may have been created with voice recognition software. Occasional wrong word or \"sound alike\" substitutions may have occurred due to the inherent limitations of voice recognition software. Read the chart carefully and recognize, using context, where substitutions have occurred.    "

## 2024-06-27 ENCOUNTER — HOME CARE VISIT (OUTPATIENT)
Dept: HOME HEALTH SERVICES | Facility: HOME HEALTHCARE | Age: 79
End: 2024-06-27
Payer: COMMERCIAL

## 2024-06-27 PROCEDURE — G0299 HHS/HOSPICE OF RN EA 15 MIN: HCPCS

## 2024-07-01 ENCOUNTER — HOME CARE VISIT (OUTPATIENT)
Dept: HOME HEALTH SERVICES | Facility: HOME HEALTHCARE | Age: 79
End: 2024-07-01
Payer: COMMERCIAL

## 2024-07-01 VITALS
TEMPERATURE: 98.2 F | OXYGEN SATURATION: 92 % | HEART RATE: 80 BPM | DIASTOLIC BLOOD PRESSURE: 70 MMHG | SYSTOLIC BLOOD PRESSURE: 112 MMHG | RESPIRATION RATE: 20 BRPM

## 2024-07-01 PROCEDURE — G0299 HHS/HOSPICE OF RN EA 15 MIN: HCPCS

## 2024-07-02 VITALS
RESPIRATION RATE: 20 BRPM | TEMPERATURE: 97.2 F | HEART RATE: 76 BPM | OXYGEN SATURATION: 93 % | SYSTOLIC BLOOD PRESSURE: 128 MMHG | DIASTOLIC BLOOD PRESSURE: 64 MMHG

## 2024-07-03 ENCOUNTER — OFFICE VISIT (OUTPATIENT)
Facility: HOSPITAL | Age: 79
End: 2024-07-03
Payer: COMMERCIAL

## 2024-07-03 VITALS
RESPIRATION RATE: 18 BRPM | TEMPERATURE: 97.6 F | HEART RATE: 86 BPM | DIASTOLIC BLOOD PRESSURE: 64 MMHG | SYSTOLIC BLOOD PRESSURE: 138 MMHG

## 2024-07-03 DIAGNOSIS — I73.9 PAD (PERIPHERAL ARTERY DISEASE) (HCC): ICD-10-CM

## 2024-07-03 DIAGNOSIS — Z72.0 TOBACCO USE: ICD-10-CM

## 2024-07-03 DIAGNOSIS — L97.302 DIABETIC ULCER OF ANKLE WITH FAT LAYER EXPOSED (HCC): Primary | ICD-10-CM

## 2024-07-03 DIAGNOSIS — Z94.5 HISTORY OF SKIN GRAFT: ICD-10-CM

## 2024-07-03 DIAGNOSIS — E11.622 DIABETIC ULCER OF ANKLE WITH FAT LAYER EXPOSED (HCC): Primary | ICD-10-CM

## 2024-07-03 PROCEDURE — 97597 DBRDMT OPN WND 1ST 20 CM/<: CPT | Performed by: STUDENT IN AN ORGANIZED HEALTH CARE EDUCATION/TRAINING PROGRAM

## 2024-07-03 NOTE — PROGRESS NOTES
Wound Procedure Treatment Diabetic Ulcer Posterior;Right Ankle    Performed by: Quiana Cardoza RN  Authorized by: Lindsey Owen PA-C    Associated wounds:   Wound 02/28/24 Diabetic Ulcer Ankle Posterior;Right  Wound cleansed with:  Wound aggrssively cleansed with NSS and gauze  Applied Topical: Mupirocin ointment    Applied primary dressing:  Non adherent contact layer  Applied secondary dressing:  ABD  Dressing secured with:  Alicia and Tape

## 2024-07-03 NOTE — PROGRESS NOTES
Patient ID: Lona Cedeno is a 78 y.o. female Date of Birth 1945       Chief Complaint   Patient presents with    Follow Up Wound Care Visit     RLE wound care       Allergies:  Paroxetine, Adhesive [medical tape], Carafate [sucralfate], Sulfa antibiotics, and Sulfamethoxazole-trimethoprim    Diagnosis:   Diagnosis ICD-10-CM Associated Orders   1. Diabetic ulcer of ankle with fat layer exposed ([facility])  E11.622 Wound Procedure Treatment Diabetic Ulcer Posterior;Right Ankle    L97.302 Wound cleansing and dressings     Debridement      2. Tobacco use  Z72.0       3. History of skin graft  Z94.5       4. PAD (peripheral artery disease) ([facility])  I73.9            Assessment  & Plan:    F/u open wound of the RLE at site of previous skin donor site. Is now healed. No drainage from the site on examination. No periwound erythema to indicate infection.   No further dressing required.   Legs without any significant edema, d/c use of Spandagrip.   F/u DFU of the R posterior ankle overlying the Catracho's tendon. Again there is significant hyperkeratotic build-up. Post debridement there are multiple sites of partial thickness skin breakdown. No purulence, or periwound erythema or edema to indicate acute soft tissue infection.   Selective debridement, as below.   Mupirocin, adaptic and overlying dry dressing. Change daily.   May shower and gently cleanse the area with gentle soap and water.  Avoid aggressive rubbing.  Use clean hands without a shower brush or wash cloth that could irritate the skin.  Try the wound immediately post shower and redress the wound immediately post shower.  Do not soak in any standing bodies of water.  At this point given the ulceration is not deep and is improving I feel as though it is reasonable to have patient return to use of shoes without a high-back that will not rub on the area.  Currently she is using Prevalon boots which are not meant for ambulation.  I would recommend she continue with use of  Prevalon boots when resting at home or sleeping at night until the ulcer is fully healed.  Recommend nightly foot checks to ensure shoes do not show signs of rubbing/irritation or blistering.   Continues to smoke. Unable to quit/reduce tobacco intake at this time.   Instructed to monitor for any changes including redness or swelling surrounding the wound, increased drainage or pain as well as fevers or chills.    F/u in one week. Instructed to call if any questions or concerns arise in meantime.          Subjective:   06/18/24: Pt presents for continued care of DFU on her R posterior ankle overlying Achilles tendon. Since previous visit she has had a new wound that opened at the site of her prior skin graft donor site that was attributed to rubbing of the Spandagrip. Currently silver alginate is being used on both wounds. Pt reports she is trying to reduce her tobacco intake but continues to smoke about 1 PPD. Has been reducing pressure on her foot with a Prevalon boot while resting.     06/25/24: Pt presents for f/u wounds on her R posterior ankle and skin graft donor site. Currently she is using Dermagran to both wounds. Has been walking with a Prevalon boot around the house. Is limiting weight bearing currently. Continues to smoke and does not feel as though she will be able to quit again.       07/02/24: Patient presents for follow-up of wounds on her right posterior ankle and skin graft donor site.  Currently mupirocin is being utilized.  No significant pain, fevers or chills.          The following portions of the patient's history were reviewed and updated as appropriate:   Patient Active Problem List   Diagnosis    Type 2 diabetes mellitus with diabetic polyneuropathy (HCC)    Essential hypertension    Anxiety and depression    GERD without esophagitis    Urinary incontinence    Degenerative lumbar disc    Lactic acidosis    Hypomagnesemia    Bladder neoplasm    Left renal mass    PAD (peripheral artery  disease) (HCC)    Abnormal CT of the chest    COPD, severity to be determined (HCC)    Tobacco use disorder    Age-related osteoporosis without current pathological fracture    Ankle ulcer, right, with fat layer exposed (HCC)    Diabetic foot ulcer with osteomyelitis (HCC)    Preoperative cardiovascular examination    Constipation    Hematoma    Anemia    At risk for venous thromboembolism (VTE)    At high risk for skin breakdown    Wound of skin    Impaired mobility and activities of daily living    Leukocytosis    Surgical site infection    Sepsis without acute organ dysfunction (HCC)    Other dietary vitamin B12 deficiency anemia    Sacral wound    Acute respiratory failure with hypoxia (HCC)    Contusion of head, sequela    Chest wall pain     Past Medical History:   Diagnosis Date    Anxiety     Closed fracture of coccyx (HCC) 2023    COPD (chronic obstructive pulmonary disease) (HCC)     Diabetes (HCC)     Diabetes mellitus (HCC)     GERD (gastroesophageal reflux disease)     Hyperlipidemia     Hypertension     IBS (irritable bowel syndrome)     Peripheral arterial disease (HCC)     Shoulder fracture     Tobacco use      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;  Surgeon: Cristian Child MD;  Location: [facility];  Service: Urology    EVACUATION OF HEMATOMA Right 2024    Procedure: EVACUATION/ DRAINAGE CHEST WALL  HEMATOMA;  Surgeon: Seth Hoyos MD;  Location: [facility];  Service: Vascular    FL RETROGRADE  PYELOGRAM  9/18/2023    HERNIA REPAIR      umbilicus w/ mesh    WI BYPASS W/VEIN AXILLARY-FEMORAL Right 1/31/2024    Procedure: BYPASS AXILLO-FEMORAL, RIGHT WITH 8MM RINGED PTFE GRAFT;  Surgeon: Seth Hoyos MD;  Location: [facility];  Service: Vascular    SPLIT THICKNESS SKIN GRAFT Right 2/28/2024    Procedure: SKIN GRAFT SPLIT THICKNESS (STSG)  EXTREMITY, Wound vac dressing change;  Surgeon: Rigo Yeboah DPM;  Location: [facility];  Service: Podiatry    WOUND DEBRIDEMENT Right 2/4/2024    Procedure: RIGHT WOUND AND ACHILLES DEBRIDEMENT FOOT/TOE (WASH OUT); PARTIAL AMPUTATION DISTAL 2ND TOE;  Surgeon: Aaron Montero DPM;  Location: [facility];  Service: Podiatry    WOUND DEBRIDEMENT Right 2/14/2024    Procedure: DEBRIDEMENT RIGHT GROIN  WOUND AND WASHOUT, APPLICATION OF WOUND VAC;  Surgeon: Suly Muro DO;  [facility]: [facility];  Service: Vascular    WOUND DEBRIDEMENT Right 2/19/2024    Procedure: DEBRIDEMENT LOWER EXTREMITY, washout;  Surgeon: Suly Muro DO;  [facility]: [facility];  Service: Vascular     Family History   Problem Relation Age of Onset    COPD Mother     Emphysema Mother     COPD Father     Emphysema Father      Social History     Socioeconomic History    Marital status: /Civil Union     Spouse name: Not on file    Number of children: Not on file    Years of education: Not on file    Highest education level: Not on file   Occupational History    Not on file   Tobacco Use    Smoking status: Every Day     Current packs/day: 0.25     Average packs/day: 1 pack/day for 63.5 years (63.1 ttl pk-yrs)     Types: Cigarettes     Start date: 1961    Smokeless tobacco: Never    Tobacco comments:     Quit January 31, 2024   Vaping Use    Vaping status: Never Used   Substance and Sexual Activity    Alcohol use: Not Currently    Drug use: Never    Sexual activity: Not Currently   Other Topics Concern    Not on file   Social History Narrative    Not on file     Social  Determinants of Health     Financial Resource Strain: Low Risk  (2/9/2023)    Overall Financial Resource Strain (CARDIA)     Difficulty of Paying Living Expenses: Not hard at all   Food Insecurity: No Food Insecurity (4/18/2024)    Hunger Vital Sign     Worried About Running Out of Food in the Last Year: Never true     Ran Out of Food in the Last Year: Never true   Transportation Needs: No Transportation Needs (4/18/2024)    PRAPARE - Transportation     Lack of Transportation (Medical): No     Lack of Transportation (Non-Medical): No   Physical Activity: Not on file   Stress: Not on file   Social Connections: Unknown (6/18/2024)    Received from [employer]     How often do you feel lonely or isolated from those around you? (Adult - for ages 18 years and over): Not on file   Intimate Partner Violence: Not on file   Housing Stability: Low Risk  (4/18/2024)    Housing Stability Vital Sign     Unable to Pay for Housing in the Last Year: No     Number of Times Moved in the Last Year: 1     Homeless in the Last Year: No       Current Outpatient Medications:     acetaminophen (TYLENOL) 325 mg tablet, Take 3 tablets (975 mg total) by mouth every 8 (eight) hours, Disp: , Rfl:     acetaminophen-codeine (TYLENOL with CODEINE #3) 300-30 MG per tablet, Take 1 tablet by mouth every 4 (four) hours as needed for moderate pain (Patient not taking: Reported on 5/17/2024), Disp: 30 tablet, Rfl: 0    albuterol (Ventolin HFA) 90 mcg/act inhaler, Inhale 2 puffs every 6 (six) hours as needed for wheezing, Disp: 18 g, Rfl: 3    AMILoride 5 mg tablet, , Disp: , Rfl:     ammonium lactate (LAC-HYDRIN) 12 % lotion, Apply topically daily To dry scaling skin (Patient not taking: Reported on 6/20/2024), Disp: 225 g, Rfl: 0    Apoaequorin 20 MG CAPS, Take 20 mg by mouth in the morning, Disp: 90 capsule, Rfl: 0    Aspirin (ASPIR-81 PO), take one by mouth daily, Disp: , Rfl:     atorvastatin (LIPITOR) 10 mg tablet, Take 1 tablet  (10 mg total) by mouth daily with dinner, Disp: 90 tablet, Rfl: 0    Blood Glucose Monitoring Suppl (OneTouch Verio) w/Device KIT, Use 2 (two) times a day, Disp: 1 kit, Rfl: 0    buPROPion (WELLBUTRIN XL) 300 mg 24 hr tablet, Take 1 tablet (300 mg total) by mouth daily, Disp: 90 tablet, Rfl: 0    clonazePAM (KlonoPIN) 1 mg tablet, Take 1 tablet (1 mg total) by mouth every evening, Disp: 30 tablet, Rfl: 0    cyanocobalamin (VITAMIN B-12) 1000 MCG tablet, Take 1 tablet (1,000 mcg total) by mouth daily, Disp: 90 tablet, Rfl: 0    Fluticasone-Salmeterol (Advair Diskus) 250-50 mcg/dose inhaler, Inhale 1 puff 2 (two) times a day Rinse mouth after use., Disp: 60 blister, Rfl: 2    Lancets (OneTouch Delica Plus [id number]) MISC, TEST TWO TIMES A DAY, Disp: 100 each, Rfl: 5    levofloxacin (LEVAQUIN) 500 mg tablet, , Disp: , Rfl:     lisinopril (ZESTRIL) 5 mg tablet, Take 1 tablet (5 mg total) by mouth daily, Disp: 90 tablet, Rfl: 1    metFORMIN (GLUCOPHAGE) 1000 MG tablet, Take 1 tablet (1,000 mg total) by mouth 2 (two) times a day with meals, Disp: 180 tablet, Rfl: 0    Misc. Devices (Carex Coccyx Cushion) MISC, Use in the morning, Disp: 1 each, Rfl: 0    mupirocin (BACTROBAN) 2 % ointment, Apply topically daily for 7 days To open wounds of RLE, Disp: 22 g, Rfl: 1    naloxone (NARCAN) 4 mg/0.1 mL nasal spray, Administer 1 spray into a nostril. If no response after 2-3 minutes, give another dose in the other nostril using a new spray. (Patient not taking: Reported on 6/20/2024), Disp: 1 each, Rfl: 1    nicotine (NICODERM CQ) 14 mg/24hr TD 24 hr patch, Place 1 patch on the skin over 24 hours daily (Patient not taking: Reported on 6/20/2024), Disp: 28 patch, Rfl: 0    Omega-3 Fatty Acids (FISH OIL PO), 1 cap oral daily (Patient not taking: Reported on 6/20/2024), Disp: , Rfl:     OneTouch Verio test strip, , Disp: , Rfl:     pantoprazole (PROTONIX) 40 mg tablet, Take 1 tablet (40 mg total) by mouth daily, Disp: 90 tablet, Rfl:  0    polyethylene glycol (MIRALAX) 17 g packet, Take 17 g by mouth daily (Patient not taking: Reported on 6/20/2024), Disp: , Rfl:     potassium chloride (Klor-Con M10) 10 mEq tablet, , Disp: , Rfl:     pregabalin (LYRICA) 200 MG capsule, TAKE ONE CAPSULE BY MOUTH THREE TIMES A DAY, Disp: 90 capsule, Rfl: 0    rivaroxaban (Xarelto) 2.5 mg tablet, Take 1 tablet (2.5 mg total) by mouth 2 (two) times a day, Disp: 180 tablet, Rfl: 0    solifenacin (VESICARE) 5 mg tablet, Take 1 tablet (5 mg total) by mouth daily, Disp: 90 tablet, Rfl: 1    Review of Systems   Constitutional:  Negative for fever.   Skin:  Positive for wound.   Psychiatric/Behavioral:  Negative for agitation.          Objective:  /64   Pulse 86   Temp 97.6 °F (36.4 °C)   Resp 18         Physical Exam  Vitals reviewed.   Constitutional:       Appearance: She is overweight.   Cardiovascular:      Pulses:           Dorsalis pedis pulses are 2+ on the right side.        Posterior tibial pulses are 2+ on the right side.   Skin:     Findings: Wound present. No erythema.             Comments: DFU of the R posterior ankle overlying the Catracho's tendon. Again there is significant hyperkeratotic build-up. Post debridement there are multiple sites of partial thickness skin breakdown. No purulence, or periwound erythema or edema to indicate acute soft tissue infection.      Neurological:      Mental Status: She is alert.         Wound 02/28/24 Diabetic Ulcer Ankle Posterior;Right (Active)   Wound Image   07/03/24 1312   Wound Description Dry;Other (Comment) 07/03/24 1313   Ani-wound Assessment Pink;Dry;Scaly;Scar Tissue 07/03/24 1313   Wound Length (cm) 0.1 cm 07/03/24 1313   Wound Width (cm) 0.1 cm 07/03/24 1313   Wound Depth (cm) 0.1 cm 07/03/24 1313   Wound Surface Area (cm^2) 0.01 cm^2 07/03/24 1313   Wound Volume (cm^3) 0.001 cm^3 07/03/24 1313   Calculated Wound Volume (cm^3) 0 cm^3 07/03/24 1313   Change in Wound Size % 100 07/03/24 1313   Drainage  "Amount Scant 07/03/24 1313   Drainage Description Serous 07/03/24 1313   Non-staged Wound Description Partial thickness 07/03/24 1313   Treatments Cleansed 06/18/24 1515   Wound packed? No 05/02/24 0844   Dressing Changed Changed 06/18/24 1515   Patient Tolerance Tolerated well 07/03/24 1313   Dressing Status Intact;Old drainage 07/03/24 1313       Wound 06/15/24 Pretibial Right (Active)   Wound Image   07/03/24 1311   Wound Description Pink;Epithelialization 07/03/24 1313   Ani-wound Assessment Dry;Scaly;Scar Tissue;Pink 07/03/24 1313   Wound Length (cm) 0 cm 07/03/24 1313   Wound Width (cm) 0 cm 07/03/24 1313   Wound Depth (cm) 0 cm 07/03/24 1313   Wound Surface Area (cm^2) 0 cm^2 07/03/24 1313   Wound Volume (cm^3) 0 cm^3 07/03/24 1313   Calculated Wound Volume (cm^3) 0 cm^3 07/03/24 1313   Drainage Amount None 07/03/24 1313   Drainage Description BJORN 07/03/24 1313   Non-staged Wound Description Full thickness 06/25/24 1443   Treatments Cleansed 06/18/24 1516   Patient Tolerance Tolerated well 07/03/24 1313   Dressing Status Intact;Clean;Dry 07/03/24 1313                            Debridement   Wound 02/28/24 Diabetic Ulcer Ankle Posterior;Right    Universal Protocol:  Consent: Verbal consent obtained.  Consent given by: patient  Time out: Immediately prior to procedure a \"time out\" was called to verify the correct patient, procedure, equipment, support staff and site/side marked as required.  Patient understanding: patient states understanding of the procedure being performed  Patient identity confirmed: verbally with patient    Debridement Details  Performed by: PA  Debridement type: selective  Pain control: lidocaine 4%      Post-debridement measurements  Length (cm): 5.1  Width (cm): 1.6  Depth (cm): 0.1  Percent debrided: 40%  Surface Area (cm^2): 8.16  Area Debrided (cm^2): 3.26  Volume (cm^3): 0.82    Devitalized tissue debrided: callus and exudate  Instrument(s) utilized: curette  Bleeding: " "small  Hemostasis obtained with: pressure  Procedural pain (0-10): 0  Post-procedural pain: 0   Response to treatment: procedure was tolerated well         Results from last 6 Months   Lab Units 02/14/24  1539   WOUND CULTURE  1+ Growth of Klebsiella pneumoniae*  1+ Growth of Proteus vulgaris group*  Few Colonies of           Wound Instructions:  Orders Placed This Encounter   Procedures    Wound Procedure Treatment Diabetic Ulcer Posterior;Right Ankle     This order was created via procedure documentation    Wound cleansing and dressings     Right Lower leg wound is healed! (7/3/24)        Right ankle wound:      Wash your hands with soap and water.  Remove old dressing, discard into plastic bag and place in trash.  Cleanse the wound with unscented soap (such as plain white Dove soap) and warm water prior to applying a clean dressing.    Shower -Yes - Ok to remove old wound dressing, shower hair and body first. Let soapy water pass by wound. No scrubbing with loofahs or washcloths. Pat dry with clean gauze and redress the wound as written:        Apply Lac Hydrin to the dry, scaly skin daily.    Apply Mupirocin ointment to both wounds. Cut to fit Adaptic or ( oily dressing.)   Cover with gauze, mitch and tape.  Change dressings every other day and as needed        No need for Spandagrip at this time.        Off-loading Instructions:  Keep weight and pressure off wound at all times  Wear off-loading device as directed by your physician----Prevalon Boot to the Right Foot when sitting, resting, and elevating lower extremity     Standing Status:   Future     Standing Expiration Date:   7/10/2024    Debridement     This order was created via procedure documentation       Cally Owen, PA-C      Portions of the record may have been created with voice recognition software. Occasional wrong word or \"sound alike\" substitutions may have occurred due to the inherent limitations of voice recognition software. Read the " chart carefully and recognize, using context, where substitutions have occurred.

## 2024-07-03 NOTE — PATIENT INSTRUCTIONS
Orders Placed This Encounter   Procedures    Wound cleansing and dressings     Right Lower leg wound is healed! (7/3/24)      *Please continue [facility]'s visiting nurses for wound care*      Right ankle wound:      Wash your hands with soap and water.  Remove old dressing, discard into plastic bag and place in trash.  Cleanse the wound with unscented soap (such as plain white Dove soap) and warm water prior to applying a clean dressing.    Shower -Yes - Ok to remove old wound dressing, shower hair and body first. Let soapy water pass by wound. No scrubbing with loofahs or washcloths. Pat dry with clean gauze and redress the wound as written:        Apply Lac Hydrin to the dry, scaly skin daily.    Apply Mupirocin ointment to both wounds. Cut to fit Adaptic or ( oily dressing.)   Cover with gauze, mitch and tape.  Change dressings every other day and as needed        No need for Spandagrip at this time.        Off-loading Instructions:  Keep weight and pressure off wound at all times  Wear off-loading device as directed by your physician----Prevalon Boot to the Right Foot when sitting, resting, and elevating lower extremity     Standing Status:   Future     Standing Expiration Date:   7/10/2024

## 2024-07-08 ENCOUNTER — HOME CARE VISIT (OUTPATIENT)
Dept: HOME HEALTH SERVICES | Facility: HOME HEALTHCARE | Age: 79
End: 2024-07-08
Payer: COMMERCIAL

## 2024-07-08 PROCEDURE — G0299 HHS/HOSPICE OF RN EA 15 MIN: HCPCS

## 2024-07-11 VITALS
DIASTOLIC BLOOD PRESSURE: 64 MMHG | HEART RATE: 80 BPM | SYSTOLIC BLOOD PRESSURE: 120 MMHG | TEMPERATURE: 98.1 F | RESPIRATION RATE: 20 BRPM | OXYGEN SATURATION: 91 %

## 2024-07-12 ENCOUNTER — OFFICE VISIT (OUTPATIENT)
Facility: HOSPITAL | Age: 79
End: 2024-07-12
Payer: COMMERCIAL

## 2024-07-12 VITALS
SYSTOLIC BLOOD PRESSURE: 124 MMHG | HEART RATE: 78 BPM | DIASTOLIC BLOOD PRESSURE: 70 MMHG | TEMPERATURE: 96.6 F | RESPIRATION RATE: 18 BRPM

## 2024-07-12 DIAGNOSIS — E11.622 DIABETIC ULCER OF ANKLE WITH FAT LAYER EXPOSED (HCC): Primary | ICD-10-CM

## 2024-07-12 DIAGNOSIS — L97.302 DIABETIC ULCER OF ANKLE WITH FAT LAYER EXPOSED (HCC): Primary | ICD-10-CM

## 2024-07-12 PROCEDURE — 99212 OFFICE O/P EST SF 10 MIN: CPT | Performed by: STUDENT IN AN ORGANIZED HEALTH CARE EDUCATION/TRAINING PROGRAM

## 2024-07-12 RX ORDER — LIDOCAINE 40 MG/G
CREAM TOPICAL ONCE
Status: COMPLETED | OUTPATIENT
Start: 2024-07-12 | End: 2024-07-12

## 2024-07-12 RX ADMIN — LIDOCAINE 1 APPLICATION: 40 CREAM TOPICAL at 14:38

## 2024-07-12 NOTE — PROGRESS NOTES
Patient ID: Lona Cedeno is a 78 y.o. female Date of Birth 1945       Chief Complaint   Patient presents with    Follow Up Wound Care Visit     Right posterior ankle       Allergies:  Paroxetine, Adhesive [medical tape], Carafate [sucralfate], Sulfa antibiotics, and Sulfamethoxazole-trimethoprim    Diagnosis:   Diagnosis ICD-10-CM Associated Orders   1. Diabetic ulcer of ankle with fat layer exposed ([facility])  E11.622 lidocaine (LMX) 4 % cream    L97.302 Wound cleansing and dressings Diabetic Ulcer Posterior;Right Ankle           Assessment  & Plan:    F/u DFU of the R posterior ankle overlying the Catracho's tendon. Is now healed. There is no drainage from the site. There is some hyperkeratotic skin build-up. No periwound edema or erythema to indicate soft tissue infection.   No further dressing required as ulcer has now healed.   Can apply Lac Hydrin to reduce some of the hyperkeratotic build-up.   Avoid pressure and friction on the site. Can begin ambulating again with cane/rollator. Should she feel unstable ambulating recommend outpatient physical therapy.   D/c from wound care center. Call in future with any questions, concerns or future wounds.          Subjective:   06/18/24: Pt presents for continued care of DFU on her R posterior ankle overlying Achilles tendon. Since previous visit she has had a new wound that opened at the site of her prior skin graft donor site that was attributed to rubbing of the Spandagrip. Currently silver alginate is being used on both wounds. Pt reports she is trying to reduce her tobacco intake but continues to smoke about 1 PPD. Has been reducing pressure on her foot with a Prevalon boot while resting.     06/25/24: Pt presents for f/u wounds on her R posterior ankle and skin graft donor site. Currently she is using Dermagran to both wounds. Has been walking with a Prevalon boot around the house. Is limiting weight bearing currently. Continues to smoke and does not feel as though  she will be able to quit again.       07/02/24: Patient presents for follow-up of wounds on her right posterior ankle and skin graft donor site.  Currently mupirocin is being utilized.  No significant pain, fevers or chills.    07/12/24: Pt presents for f/u ulceration of her R posterior ankle.           The following portions of the patient's history were reviewed and updated as appropriate:   Patient Active Problem List   Diagnosis    Type 2 diabetes mellitus with diabetic polyneuropathy (HCC)    Essential hypertension    Anxiety and depression    GERD without esophagitis    Urinary incontinence    Degenerative lumbar disc    Lactic acidosis    Hypomagnesemia    Bladder neoplasm    Left renal mass    PAD (peripheral artery disease) (HCC)    Abnormal CT of the chest    COPD, severity to be determined ([facility])    Tobacco use disorder    Age-related osteoporosis without current pathological fracture    Ankle ulcer, right, with fat layer exposed ([facility])    Diabetic foot ulcer with osteomyelitis ([facility])    Preoperative cardiovascular examination    Constipation    Hematoma    Anemia    At risk for venous thromboembolism (VTE)    At high risk for skin breakdown    Wound of skin    Impaired mobility and activities of daily living    Leukocytosis    Surgical site infection    Sepsis without acute organ dysfunction (HCC)    Other dietary vitamin B12 deficiency anemia    Sacral wound    Acute respiratory failure with hypoxia (HCC)    Contusion of head, sequela    Chest wall pain     Past Medical History:   Diagnosis Date    Anxiety     Closed fracture of coccyx (HCC) 05/24/2023    COPD (chronic obstructive pulmonary disease) (HCC)     Diabetes (HCC)     Diabetes mellitus (HCC)     GERD (gastroesophageal reflux disease)     Hyperlipidemia     Hypertension     IBS (irritable bowel syndrome)     Peripheral arterial disease (HCC)     Shoulder fracture     Tobacco use      Past Surgical History:   Procedure Laterality Date    ANKLE  FRACTURE SURGERY Right     has screw in place     SECTION      x2    CHOLECYSTECTOMY      CYSTOSCOPY W/ LASER LITHOTRIPSY Left 2023    Procedure: CYSTOSCOPY; RETROGRADE; URETEROSCOPY; BIOPSY; LEFT -INSERTION OF STENT;  Surgeon: Cristian Child MD;  Location: [facility];  Service: Urology    CYSTOSCOPY W/ LASER LITHOTRIPSY Left 2023    Procedure: CYSTOSCOPY; RETROGRADE; URETEROSCOPY; 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;  Surgeon: Cristian Child MD;  Location: [facility];  Service: Urology    EVACUATION OF HEMATOMA Right 2024    Procedure: EVACUATION/ DRAINAGE CHEST WALL  HEMATOMA;  Surgeon: Seth Hoyos MD;  Location: [facility];  Service: Vascular    FL RETROGRADE PYELOGRAM  2023    HERNIA REPAIR      umbilicus w/ mesh    MO BYPASS W/VEIN AXILLARY-FEMORAL Right 2024    Procedure: BYPASS AXILLO-FEMORAL, RIGHT WITH 8MM RINGED PTFE GRAFT;  Surgeon: Seth Hoyos MD;  Location: [facility];  Service: Vascular    SPLIT THICKNESS SKIN GRAFT Right 2024    Procedure: SKIN GRAFT SPLIT THICKNESS (STSG)  EXTREMITY, Wound vac dressing change;  Surgeon: Rigo Yeboah DPM;  Location: [facility];  Service: Podiatry    WOUND DEBRIDEMENT Right 2024    Procedure: RIGHT WOUND AND ACHILLES DEBRIDEMENT FOOT/TOE (WASH OUT); PARTIAL AMPUTATION DISTAL 2ND TOE;  Surgeon: Aaron Montero DPM;  Location: [facility];  Service: Podiatry    WOUND DEBRIDEMENT Right 2024    Procedure: DEBRIDEMENT RIGHT GROIN  WOUND AND WASHOUT, APPLICATION OF WOUND VAC;  Surgeon: Suly Muro DO;  [facility]: [facility];  Service: Vascular    WOUND DEBRIDEMENT Right 2024    Procedure: DEBRIDEMENT LOWER EXTREMITY, washout;  Surgeon: Suly Muro DO;  [facility]: [facility];  Service: Vascular     Family History   Problem Relation Age of Onset    COPD Mother     Emphysema Mother     COPD Father     Emphysema Father      Social History     Socioeconomic  History    Marital status: /Civil Union     Spouse name: None    Number of children: None    Years of education: None    Highest education level: None   Occupational History    None   Tobacco Use    Smoking status: Every Day     Current packs/day: 0.25     Average packs/day: 1 pack/day for 63.5 years (63.1 ttl pk-yrs)     Types: Cigarettes     Start date: 1961    Smokeless tobacco: Never    Tobacco comments:     Quit January 31, 2024   Vaping Use    Vaping status: Never Used   Substance and Sexual Activity    Alcohol use: Not Currently    Drug use: Never    Sexual activity: Not Currently   Other Topics Concern    None   Social History Narrative    None     Social Determinants of Health     Financial Resource Strain: Low Risk  (2/9/2023)    Overall Financial Resource Strain (CARDIA)     Difficulty of Paying Living Expenses: Not hard at all   Food Insecurity: No Food Insecurity (4/18/2024)    Hunger Vital Sign     Worried About Running Out of Food in the Last Year: Never true     Ran Out of Food in the Last Year: Never true   Transportation Needs: No Transportation Needs (4/18/2024)    PRAPARE - Transportation     Lack of Transportation (Medical): No     Lack of Transportation (Non-Medical): No   Physical Activity: Not on file   Stress: Not on file   Social Connections: Unknown (6/18/2024)    Received from [email or web address]     How often do you feel lonely or isolated from those around you? (Adult - for ages 18 years and over): Not on file   Intimate Partner Violence: Not on file   Housing Stability: Low Risk  (4/18/2024)    Housing Stability Vital Sign     Unable to Pay for Housing in the Last Year: No     Number of Times Moved in the Last Year: 1     Homeless in the Last Year: No       Current Outpatient Medications:     acetaminophen (TYLENOL) 325 mg tablet, Take 3 tablets (975 mg total) by mouth every 8 (eight) hours, Disp: , Rfl:     acetaminophen-codeine (TYLENOL with CODEINE #3) 300-30 MG  per tablet, Take 1 tablet by mouth every 4 (four) hours as needed for moderate pain (Patient not taking: Reported on 5/17/2024), Disp: 30 tablet, Rfl: 0    albuterol (Ventolin HFA) 90 mcg/act inhaler, Inhale 2 puffs every 6 (six) hours as needed for wheezing, Disp: 18 g, Rfl: 3    AMILoride 5 mg tablet, , Disp: , Rfl:     ammonium lactate (LAC-HYDRIN) 12 % lotion, Apply topically daily To dry scaling skin (Patient not taking: Reported on 6/20/2024), Disp: 225 g, Rfl: 0    Apoaequorin 20 MG CAPS, Take 20 mg by mouth in the morning, Disp: 90 capsule, Rfl: 0    Aspirin (ASPIR-81 PO), take one by mouth daily, Disp: , Rfl:     atorvastatin (LIPITOR) 10 mg tablet, Take 1 tablet (10 mg total) by mouth daily with dinner, Disp: 90 tablet, Rfl: 0    Blood Glucose Monitoring Suppl (OneTouch Verio) w/Device KIT, Use 2 (two) times a day, Disp: 1 kit, Rfl: 0    buPROPion (WELLBUTRIN XL) 300 mg 24 hr tablet, Take 1 tablet (300 mg total) by mouth daily, Disp: 90 tablet, Rfl: 0    clonazePAM (KlonoPIN) 1 mg tablet, Take 1 tablet (1 mg total) by mouth every evening, Disp: 30 tablet, Rfl: 0    cyanocobalamin (VITAMIN B-12) 1000 MCG tablet, Take 1 tablet (1,000 mcg total) by mouth daily, Disp: 90 tablet, Rfl: 0    Fluticasone-Salmeterol (Advair Diskus) 250-50 mcg/dose inhaler, Inhale 1 puff 2 (two) times a day Rinse mouth after use., Disp: 60 blister, Rfl: 2    Lancets (OneTouch Delica Plus [id number]) MISC, TEST TWO TIMES A DAY, Disp: 100 each, Rfl: 5    levofloxacin (LEVAQUIN) 500 mg tablet, , Disp: , Rfl:     lisinopril (ZESTRIL) 5 mg tablet, Take 1 tablet (5 mg total) by mouth daily, Disp: 90 tablet, Rfl: 1    metFORMIN (GLUCOPHAGE) 1000 MG tablet, Take 1 tablet (1,000 mg total) by mouth 2 (two) times a day with meals, Disp: 180 tablet, Rfl: 0    Misc. Devices (Carex Coccyx Cushion) MISC, Use in the morning, Disp: 1 each, Rfl: 0    mupirocin (BACTROBAN) 2 % ointment, Apply topically daily for 7 days To open wounds of RLE, Disp: 22  g, Rfl: 1    naloxone (NARCAN) 4 mg/0.1 mL nasal spray, Administer 1 spray into a nostril. If no response after 2-3 minutes, give another dose in the other nostril using a new spray. (Patient not taking: Reported on 6/20/2024), Disp: 1 each, Rfl: 1    nicotine (NICODERM CQ) 14 mg/24hr TD 24 hr patch, Place 1 patch on the skin over 24 hours daily (Patient not taking: Reported on 6/20/2024), Disp: 28 patch, Rfl: 0    Omega-3 Fatty Acids (FISH OIL PO), 1 cap oral daily (Patient not taking: Reported on 6/20/2024), Disp: , Rfl:     OneTouch Verio test strip, , Disp: , Rfl:     pantoprazole (PROTONIX) 40 mg tablet, Take 1 tablet (40 mg total) by mouth daily, Disp: 90 tablet, Rfl: 0    polyethylene glycol (MIRALAX) 17 g packet, Take 17 g by mouth daily (Patient not taking: Reported on 6/20/2024), Disp: , Rfl:     potassium chloride (Klor-Con M10) 10 mEq tablet, , Disp: , Rfl:     pregabalin (LYRICA) 200 MG capsule, TAKE ONE CAPSULE BY MOUTH THREE TIMES A DAY, Disp: 90 capsule, Rfl: 0    rivaroxaban (Xarelto) 2.5 mg tablet, Take 1 tablet (2.5 mg total) by mouth 2 (two) times a day, Disp: 180 tablet, Rfl: 0    solifenacin (VESICARE) 5 mg tablet, Take 1 tablet (5 mg total) by mouth daily, Disp: 90 tablet, Rfl: 1  No current facility-administered medications for this visit.    Review of Systems   Constitutional:  Negative for fever.   Musculoskeletal:  Positive for gait problem (unstable).   Skin:  Negative for wound.   Psychiatric/Behavioral:  Negative for agitation.          Objective:  /70   Pulse 78   Temp (!) 96.6 °F (35.9 °C) (Temporal)   Resp 18   Pain Score: 0-No pain     Physical Exam  Vitals reviewed.   Constitutional:       Appearance: She is overweight.   Cardiovascular:      Pulses:           Dorsalis pedis pulses are 2+ on the right side.        Posterior tibial pulses are 2+ on the right side.   Skin:     Findings: No erythema or wound.      Comments: R posterior ankle DFU is now healed. There is no  "drainage from the site. There is some hyperkeratotic skin build-up. No periwound edema or erythema to indicate soft tissue infection.      Neurological:      Mental Status: She is alert.         Wound 02/28/24 Diabetic Ulcer Ankle Posterior;Right (Active)   Wound Description Dry;Pink 07/12/24 1434   Ani-wound Assessment Pink;Dry;Scaly;Scar Tissue 07/12/24 1434   Wound Length (cm) 0 cm 07/12/24 1434   Wound Width (cm) 0 cm 07/12/24 1434   Wound Depth (cm) 0 cm 07/12/24 1434   Wound Surface Area (cm^2) 0 cm^2 07/12/24 1434   Wound Volume (cm^3) 0 cm^3 07/12/24 1434   Calculated Wound Volume (cm^3) 0 cm^3 07/12/24 1434   Change in Wound Size % 100 07/12/24 1434   Drainage Amount None 07/12/24 1434   Non-staged Wound Description Not applicable 07/12/24 1434   Dressing Status Old drainage 07/12/24 1434     Results from last 6 Months   Lab Units 02/14/24  1539   WOUND CULTURE  1+ Growth of Klebsiella pneumoniae*  1+ Growth of Proteus vulgaris group*  Few Colonies of           Wound Instructions:  Orders Placed This Encounter   Procedures    Wound cleansing and dressings Diabetic Ulcer Posterior;Right Ankle     Your wound is healed today and you are discharged from the wound center today.    You may continue to cover the area for the next several days and if no further drainage is noted then may leave open to the air    Continue to elevate your ankle on a pillow to keep pressure of the area.     Standing Status:   Future     Standing Expiration Date:   7/12/2024       Lindsey Owen PA-C    Portions of the record may have been created with voice recognition software. Occasional wrong word or \"sound alike\" substitutions may have occurred due to the inherent limitations of voice recognition software. Read the chart carefully and recognize, using context, where substitutions have occurred.    "

## 2024-07-12 NOTE — PATIENT INSTRUCTIONS
Orders Placed This Encounter   Procedures    Wound cleansing and dressings Diabetic Ulcer Posterior;Right Ankle     Your wound is healed today and you are discharged from the wound center today.    You may continue to cover the area for the next several days and if no further drainage is noted then may leave open to the air    Continue to elevate your ankle on a pillow to keep pressure of the area.     Standing Status:   Future     Standing Expiration Date:   7/12/2024

## 2024-07-16 ENCOUNTER — OFFICE VISIT (OUTPATIENT)
Dept: FAMILY MEDICINE CLINIC | Facility: CLINIC | Age: 79
End: 2024-07-16
Payer: COMMERCIAL

## 2024-07-16 ENCOUNTER — APPOINTMENT (EMERGENCY)
Dept: RADIOLOGY | Facility: HOSPITAL | Age: 79
DRG: 871 | End: 2024-07-16
Payer: COMMERCIAL

## 2024-07-16 ENCOUNTER — HOSPITAL ENCOUNTER (INPATIENT)
Facility: HOSPITAL | Age: 79
LOS: 3 days | Discharge: HOME | DRG: 871 | End: 2024-07-19
Attending: INTERNAL MEDICINE | Admitting: HOSPITALIST
Payer: COMMERCIAL

## 2024-07-16 VITALS
WEIGHT: 168 LBS | SYSTOLIC BLOOD PRESSURE: 150 MMHG | RESPIRATION RATE: 24 BRPM | BODY MASS INDEX: 31.74 KG/M2 | DIASTOLIC BLOOD PRESSURE: 64 MMHG | TEMPERATURE: 103.5 F | HEART RATE: 105 BPM | OXYGEN SATURATION: 90 %

## 2024-07-16 DIAGNOSIS — J18.9 RIGHT LOWER LOBE PNEUMONIA: ICD-10-CM

## 2024-07-16 DIAGNOSIS — L97.312 ANKLE ULCER, RIGHT, WITH FAT LAYER EXPOSED (HCC): ICD-10-CM

## 2024-07-16 DIAGNOSIS — S91.301D OPEN WOUND OF RIGHT FOOT, SUBSEQUENT ENCOUNTER: ICD-10-CM

## 2024-07-16 DIAGNOSIS — S91.001D WOUND OF RIGHT ANKLE, SUBSEQUENT ENCOUNTER: ICD-10-CM

## 2024-07-16 DIAGNOSIS — J96.01 ACUTE RESPIRATORY FAILURE WITH HYPOXIA (HCC): ICD-10-CM

## 2024-07-16 DIAGNOSIS — A41.9 SEPSIS WITHOUT ACUTE ORGAN DYSFUNCTION, DUE TO UNSPECIFIED ORGANISM (HCC): ICD-10-CM

## 2024-07-16 DIAGNOSIS — S91.009A ANKLE WOUND: ICD-10-CM

## 2024-07-16 DIAGNOSIS — I10 ESSENTIAL HYPERTENSION: Chronic | ICD-10-CM

## 2024-07-16 DIAGNOSIS — E11.42 TYPE 2 DIABETES MELLITUS WITH DIABETIC POLYNEUROPATHY, WITHOUT LONG-TERM CURRENT USE OF INSULIN (HCC): Chronic | ICD-10-CM

## 2024-07-16 DIAGNOSIS — A41.9 SEPSIS (HCC): Primary | ICD-10-CM

## 2024-07-16 DIAGNOSIS — E87.5 HYPERKALEMIA: ICD-10-CM

## 2024-07-16 DIAGNOSIS — G92.9 TOXIC ENCEPHALOPATHY: Primary | ICD-10-CM

## 2024-07-16 PROBLEM — E11.621 DIABETIC FOOT ULCER WITH OSTEOMYELITIS (HCC): Status: RESOLVED | Noted: 2024-01-23 | Resolved: 2024-07-16

## 2024-07-16 PROBLEM — T81.49XA SURGICAL SITE INFECTION: Status: RESOLVED | Noted: 2024-02-13 | Resolved: 2024-07-16

## 2024-07-16 PROBLEM — E87.20 LACTIC ACIDOSIS: Status: RESOLVED | Noted: 2023-08-11 | Resolved: 2024-07-16

## 2024-07-16 PROBLEM — E83.42 HYPOMAGNESEMIA: Status: RESOLVED | Noted: 2023-08-11 | Resolved: 2024-07-16

## 2024-07-16 PROBLEM — M86.9 DIABETIC FOOT ULCER WITH OSTEOMYELITIS (HCC): Status: RESOLVED | Noted: 2024-01-23 | Resolved: 2024-07-16

## 2024-07-16 PROBLEM — E11.69 DIABETIC FOOT ULCER WITH OSTEOMYELITIS (HCC): Status: RESOLVED | Noted: 2024-01-23 | Resolved: 2024-07-16

## 2024-07-16 PROBLEM — R07.89 CHEST WALL PAIN: Status: RESOLVED | Noted: 2024-04-29 | Resolved: 2024-07-16

## 2024-07-16 PROBLEM — L97.509 DIABETIC FOOT ULCER WITH OSTEOMYELITIS (HCC): Status: RESOLVED | Noted: 2024-01-23 | Resolved: 2024-07-16

## 2024-07-16 LAB
ALBUMIN SERPL BCG-MCNC: 4.4 G/DL (ref 3.5–5)
ALP SERPL-CCNC: 68 U/L (ref 34–104)
ALT SERPL W P-5'-P-CCNC: 20 U/L (ref 7–52)
ANION GAP SERPL CALCULATED.3IONS-SCNC: 8 MMOL/L (ref 4–13)
APTT PPP: 30 SECONDS (ref 23–37)
AST SERPL W P-5'-P-CCNC: 43 U/L (ref 13–39)
BASOPHILS # BLD AUTO: 0.04 THOUSANDS/ÂΜL (ref 0–0.1)
BASOPHILS NFR BLD AUTO: 0 % (ref 0–1)
BILIRUB SERPL-MCNC: 0.37 MG/DL (ref 0.2–1)
BILIRUB UR QL STRIP: NEGATIVE
BNP SERPL-MCNC: 22 PG/ML (ref 0–100)
BUN SERPL-MCNC: 17 MG/DL (ref 5–25)
CALCIUM SERPL-MCNC: 10.4 MG/DL (ref 8.4–10.2)
CHLORIDE SERPL-SCNC: 101 MMOL/L (ref 96–108)
CLARITY UR: CLEAR
CO2 SERPL-SCNC: 25 MMOL/L (ref 21–32)
COLOR UR: YELLOW
CREAT SERPL-MCNC: 0.67 MG/DL (ref 0.6–1.3)
EOSINOPHIL # BLD AUTO: 0.05 THOUSAND/ÂΜL (ref 0–0.61)
EOSINOPHIL NFR BLD AUTO: 0 % (ref 0–6)
ERYTHROCYTE [DISTWIDTH] IN BLOOD BY AUTOMATED COUNT: 15.6 % (ref 11.6–15.1)
FLUAV RNA RESP QL NAA+PROBE: NEGATIVE
FLUAV RNA RESP QL NAA+PROBE: NEGATIVE
FLUBV RNA RESP QL NAA+PROBE: NEGATIVE
FLUBV RNA RESP QL NAA+PROBE: NEGATIVE
GFR SERPL CREATININE-BSD FRML MDRD: 85 ML/MIN/1.73SQ M
GLUCOSE SERPL-MCNC: 119 MG/DL (ref 65–140)
GLUCOSE SERPL-MCNC: 144 MG/DL (ref 65–140)
GLUCOSE UR STRIP-MCNC: NEGATIVE MG/DL
HCT VFR BLD AUTO: 45.8 % (ref 34.8–46.1)
HGB BLD-MCNC: 14.4 G/DL (ref 11.5–15.4)
HGB UR QL STRIP.AUTO: NEGATIVE
IMM GRANULOCYTES # BLD AUTO: 0.11 THOUSAND/UL (ref 0–0.2)
IMM GRANULOCYTES NFR BLD AUTO: 1 % (ref 0–2)
INR PPP: 1.17 (ref 0.84–1.19)
KETONES UR STRIP-MCNC: NEGATIVE MG/DL
LACTATE SERPL-SCNC: 2 MMOL/L (ref 0.5–2)
LEUKOCYTE ESTERASE UR QL STRIP: NEGATIVE
LYMPHOCYTES # BLD AUTO: 0.85 THOUSANDS/ÂΜL (ref 0.6–4.47)
LYMPHOCYTES NFR BLD AUTO: 4 % (ref 14–44)
MCH RBC QN AUTO: 28.9 PG (ref 26.8–34.3)
MCHC RBC AUTO-ENTMCNC: 31.4 G/DL (ref 31.4–37.4)
MCV RBC AUTO: 92 FL (ref 82–98)
MONOCYTES # BLD AUTO: 1.11 THOUSAND/ÂΜL (ref 0.17–1.22)
MONOCYTES NFR BLD AUTO: 6 % (ref 4–12)
NEUTROPHILS # BLD AUTO: 17.67 THOUSANDS/ÂΜL (ref 1.85–7.62)
NEUTS SEG NFR BLD AUTO: 89 % (ref 43–75)
NITRITE UR QL STRIP: NEGATIVE
NRBC BLD AUTO-RTO: 0 /100 WBCS
PH UR STRIP.AUTO: 7.5 [PH]
PLATELET # BLD AUTO: 277 THOUSANDS/UL (ref 149–390)
PMV BLD AUTO: 9.8 FL (ref 8.9–12.7)
POTASSIUM SERPL-SCNC: 5.4 MMOL/L (ref 3.5–5.3)
PROCALCITONIN SERPL-MCNC: 0.76 NG/ML
PROT SERPL-MCNC: 7.2 G/DL (ref 6.4–8.4)
PROT UR STRIP-MCNC: NEGATIVE MG/DL
PROTHROMBIN TIME: 15 SECONDS (ref 11.6–14.5)
RBC # BLD AUTO: 4.99 MILLION/UL (ref 3.81–5.12)
RSV RNA RESP QL NAA+PROBE: NEGATIVE
RSV RNA RESP QL NAA+PROBE: NEGATIVE
SARS-COV-2 RNA RESP QL NAA+PROBE: NEGATIVE
SARS-COV-2 RNA RESP QL NAA+PROBE: NEGATIVE
SODIUM SERPL-SCNC: 134 MMOL/L (ref 135–147)
SP GR UR STRIP.AUTO: 1.01
UROBILINOGEN UR QL STRIP.AUTO: 0.2 E.U./DL
WBC # BLD AUTO: 19.83 THOUSAND/UL (ref 4.31–10.16)

## 2024-07-16 PROCEDURE — 96366 THER/PROPH/DIAG IV INF ADDON: CPT

## 2024-07-16 PROCEDURE — 87040 BLOOD CULTURE FOR BACTERIA: CPT

## 2024-07-16 PROCEDURE — 96368 THER/DIAG CONCURRENT INF: CPT

## 2024-07-16 PROCEDURE — 0241U HB NFCT DS VIR RESP RNA 4 TRGT: CPT

## 2024-07-16 PROCEDURE — 0241U HB NFCT DS VIR RESP RNA 4 TRGT: CPT | Performed by: PHYSICIAN ASSISTANT

## 2024-07-16 PROCEDURE — 73610 X-RAY EXAM OF ANKLE: CPT

## 2024-07-16 PROCEDURE — 83605 ASSAY OF LACTIC ACID: CPT

## 2024-07-16 PROCEDURE — 85610 PROTHROMBIN TIME: CPT

## 2024-07-16 PROCEDURE — 99291 CRITICAL CARE FIRST HOUR: CPT | Performed by: INTERNAL MEDICINE

## 2024-07-16 PROCEDURE — 36415 COLL VENOUS BLD VENIPUNCTURE: CPT

## 2024-07-16 PROCEDURE — 85025 COMPLETE CBC W/AUTO DIFF WBC: CPT

## 2024-07-16 PROCEDURE — 85730 THROMBOPLASTIN TIME PARTIAL: CPT

## 2024-07-16 PROCEDURE — 80053 COMPREHEN METABOLIC PANEL: CPT

## 2024-07-16 PROCEDURE — 82948 REAGENT STRIP/BLOOD GLUCOSE: CPT

## 2024-07-16 PROCEDURE — 84145 PROCALCITONIN (PCT): CPT

## 2024-07-16 PROCEDURE — 96365 THER/PROPH/DIAG IV INF INIT: CPT

## 2024-07-16 PROCEDURE — 83880 ASSAY OF NATRIURETIC PEPTIDE: CPT | Performed by: INTERNAL MEDICINE

## 2024-07-16 PROCEDURE — 99285 EMERGENCY DEPT VISIT HI MDM: CPT

## 2024-07-16 PROCEDURE — G2211 COMPLEX E/M VISIT ADD ON: HCPCS | Performed by: FAMILY MEDICINE

## 2024-07-16 PROCEDURE — 87449 NOS EACH ORGANISM AG IA: CPT | Performed by: PHYSICIAN ASSISTANT

## 2024-07-16 PROCEDURE — 99214 OFFICE O/P EST MOD 30 MIN: CPT | Performed by: FAMILY MEDICINE

## 2024-07-16 PROCEDURE — 71045 X-RAY EXAM CHEST 1 VIEW: CPT

## 2024-07-16 PROCEDURE — 81003 URINALYSIS AUTO W/O SCOPE: CPT | Performed by: INTERNAL MEDICINE

## 2024-07-16 RX ORDER — PANTOPRAZOLE SODIUM 40 MG/1
40 TABLET, DELAYED RELEASE ORAL DAILY
Status: DISCONTINUED | OUTPATIENT
Start: 2024-07-17 | End: 2024-07-19 | Stop reason: HOSPADM

## 2024-07-16 RX ORDER — AMILORIDE HYDROCHLORIDE 5 MG/1
5 TABLET ORAL DAILY
Status: DISCONTINUED | OUTPATIENT
Start: 2024-07-17 | End: 2024-07-19 | Stop reason: HOSPADM

## 2024-07-16 RX ORDER — ACETAMINOPHEN 325 MG/1
650 TABLET ORAL ONCE
Status: COMPLETED | OUTPATIENT
Start: 2024-07-16 | End: 2024-07-16

## 2024-07-16 RX ORDER — ONDANSETRON 2 MG/ML
4 INJECTION INTRAMUSCULAR; INTRAVENOUS EVERY 6 HOURS PRN
Status: DISCONTINUED | OUTPATIENT
Start: 2024-07-16 | End: 2024-07-19 | Stop reason: HOSPADM

## 2024-07-16 RX ORDER — VANCOMYCIN HYDROCHLORIDE 1 G/200ML
17.5 INJECTION, SOLUTION INTRAVENOUS ONCE
Status: COMPLETED | OUTPATIENT
Start: 2024-07-16 | End: 2024-07-16

## 2024-07-16 RX ORDER — SODIUM CHLORIDE, SODIUM GLUCONATE, SODIUM ACETATE, POTASSIUM CHLORIDE, MAGNESIUM CHLORIDE, SODIUM PHOSPHATE, DIBASIC, AND POTASSIUM PHOSPHATE .53; .5; .37; .037; .03; .012; .00082 G/100ML; G/100ML; G/100ML; G/100ML; G/100ML; G/100ML; G/100ML
1000 INJECTION, SOLUTION INTRAVENOUS ONCE
Status: COMPLETED | OUTPATIENT
Start: 2024-07-16 | End: 2024-07-16

## 2024-07-16 RX ORDER — VANCOMYCIN HYDROCHLORIDE 750 MG/150ML
12.5 INJECTION, SOLUTION INTRAVENOUS EVERY 12 HOURS
Status: DISCONTINUED | OUTPATIENT
Start: 2024-07-17 | End: 2024-07-18

## 2024-07-16 RX ORDER — AMMONIUM LACTATE 12 G/100G
LOTION TOPICAL DAILY
Status: DISCONTINUED | OUTPATIENT
Start: 2024-07-17 | End: 2024-07-19 | Stop reason: HOSPADM

## 2024-07-16 RX ORDER — CEFEPIME HYDROCHLORIDE 2 G/50ML
2000 INJECTION, SOLUTION INTRAVENOUS EVERY 12 HOURS
Status: DISCONTINUED | OUTPATIENT
Start: 2024-07-17 | End: 2024-07-18

## 2024-07-16 RX ORDER — LISINOPRIL 5 MG/1
5 TABLET ORAL DAILY
Status: DISCONTINUED | OUTPATIENT
Start: 2024-07-17 | End: 2024-07-19 | Stop reason: HOSPADM

## 2024-07-16 RX ORDER — ATORVASTATIN CALCIUM 10 MG/1
10 TABLET, FILM COATED ORAL
Status: DISCONTINUED | OUTPATIENT
Start: 2024-07-17 | End: 2024-07-19 | Stop reason: HOSPADM

## 2024-07-16 RX ORDER — ALBUTEROL SULFATE 90 UG/1
2 AEROSOL, METERED RESPIRATORY (INHALATION) EVERY 6 HOURS PRN
Status: DISCONTINUED | OUTPATIENT
Start: 2024-07-16 | End: 2024-07-19 | Stop reason: HOSPADM

## 2024-07-16 RX ORDER — POLYETHYLENE GLYCOL 3350 17 G/17G
17 POWDER, FOR SOLUTION ORAL DAILY
Status: DISCONTINUED | OUTPATIENT
Start: 2024-07-17 | End: 2024-07-19 | Stop reason: HOSPADM

## 2024-07-16 RX ORDER — ACETAMINOPHEN 325 MG/1
650 TABLET ORAL EVERY 6 HOURS PRN
Status: DISCONTINUED | OUTPATIENT
Start: 2024-07-16 | End: 2024-07-19 | Stop reason: HOSPADM

## 2024-07-16 RX ORDER — FLUTICASONE FUROATE AND VILANTEROL 100; 25 UG/1; UG/1
1 POWDER RESPIRATORY (INHALATION)
Status: DISCONTINUED | OUTPATIENT
Start: 2024-07-17 | End: 2024-07-19 | Stop reason: HOSPADM

## 2024-07-16 RX ORDER — CEFTRIAXONE 2 G/50ML
2000 INJECTION, SOLUTION INTRAVENOUS ONCE
Status: COMPLETED | OUTPATIENT
Start: 2024-07-16 | End: 2024-07-16

## 2024-07-16 RX ORDER — OXYBUTYNIN CHLORIDE 5 MG/1
5 TABLET, EXTENDED RELEASE ORAL DAILY
Status: DISCONTINUED | OUTPATIENT
Start: 2024-07-17 | End: 2024-07-19 | Stop reason: HOSPADM

## 2024-07-16 RX ORDER — INSULIN LISPRO 100 [IU]/ML
1-6 INJECTION, SOLUTION INTRAVENOUS; SUBCUTANEOUS
Status: DISCONTINUED | OUTPATIENT
Start: 2024-07-16 | End: 2024-07-19 | Stop reason: HOSPADM

## 2024-07-16 RX ORDER — GUAIFENESIN/DEXTROMETHORPHAN 100-10MG/5
10 SYRUP ORAL EVERY 4 HOURS PRN
Status: DISCONTINUED | OUTPATIENT
Start: 2024-07-16 | End: 2024-07-19 | Stop reason: HOSPADM

## 2024-07-16 RX ORDER — PREGABALIN 100 MG/1
200 CAPSULE ORAL 3 TIMES DAILY
Status: DISCONTINUED | OUTPATIENT
Start: 2024-07-16 | End: 2024-07-19 | Stop reason: HOSPADM

## 2024-07-16 RX ORDER — INSULIN LISPRO 100 [IU]/ML
1-6 INJECTION, SOLUTION INTRAVENOUS; SUBCUTANEOUS
Status: DISCONTINUED | OUTPATIENT
Start: 2024-07-17 | End: 2024-07-19 | Stop reason: HOSPADM

## 2024-07-16 RX ORDER — BUPROPION HYDROCHLORIDE 150 MG/1
300 TABLET ORAL DAILY
Status: DISCONTINUED | OUTPATIENT
Start: 2024-07-17 | End: 2024-07-19 | Stop reason: HOSPADM

## 2024-07-16 RX ORDER — SODIUM CHLORIDE 9 MG/ML
125 INJECTION, SOLUTION INTRAVENOUS CONTINUOUS
Status: DISCONTINUED | OUTPATIENT
Start: 2024-07-16 | End: 2024-07-18

## 2024-07-16 RX ORDER — NICOTINE 21 MG/24HR
1 PATCH, TRANSDERMAL 24 HOURS TRANSDERMAL DAILY
Status: DISCONTINUED | OUTPATIENT
Start: 2024-07-16 | End: 2024-07-19 | Stop reason: HOSPADM

## 2024-07-16 RX ADMIN — VANCOMYCIN HYDROCHLORIDE 1000 MG: 1 INJECTION, SOLUTION INTRAVENOUS at 20:56

## 2024-07-16 RX ADMIN — ACETAMINOPHEN 650 MG: 325 TABLET ORAL at 18:35

## 2024-07-16 RX ADMIN — PREGABALIN 200 MG: 100 CAPSULE ORAL at 20:51

## 2024-07-16 RX ADMIN — SODIUM CHLORIDE 125 ML/HR: 0.9 INJECTION, SOLUTION INTRAVENOUS at 21:58

## 2024-07-16 RX ADMIN — RIVAROXABAN 2.5 MG: 2.5 TABLET, FILM COATED ORAL at 22:06

## 2024-07-16 RX ADMIN — SODIUM CHLORIDE, SODIUM GLUCONATE, SODIUM ACETATE, POTASSIUM CHLORIDE, MAGNESIUM CHLORIDE, SODIUM PHOSPHATE, DIBASIC, AND POTASSIUM PHOSPHATE 1000 ML: .53; .5; .37; .037; .03; .012; .00082 INJECTION, SOLUTION INTRAVENOUS at 18:26

## 2024-07-16 RX ADMIN — NICOTINE 1 PATCH: 21 PATCH, EXTENDED RELEASE TRANSDERMAL at 20:51

## 2024-07-16 RX ADMIN — SODIUM CHLORIDE, SODIUM GLUCONATE, SODIUM ACETATE, POTASSIUM CHLORIDE, MAGNESIUM CHLORIDE, SODIUM PHOSPHATE, DIBASIC, AND POTASSIUM PHOSPHATE 1000 ML: .53; .5; .37; .037; .03; .012; .00082 INJECTION, SOLUTION INTRAVENOUS at 19:11

## 2024-07-16 RX ADMIN — CEFTRIAXONE 2000 MG: 2 INJECTION, SOLUTION INTRAVENOUS at 18:36

## 2024-07-16 RX ADMIN — SODIUM CHLORIDE, SODIUM GLUCONATE, SODIUM ACETATE, POTASSIUM CHLORIDE, MAGNESIUM CHLORIDE, SODIUM PHOSPHATE, DIBASIC, AND POTASSIUM PHOSPHATE 1000 ML: .53; .5; .37; .037; .03; .012; .00082 INJECTION, SOLUTION INTRAVENOUS at 19:12

## 2024-07-16 NOTE — ASSESSMENT & PLAN NOTE
Referral to emergency department now daughter will bring her in for evaluation in light of her medical history

## 2024-07-16 NOTE — ASSESSMENT & PLAN NOTE
Lab Results   Component Value Date    HGBA1C 7.6 (H) 06/08/2024   Continue all medications and monitor blood sugars accurately in light of recent infection

## 2024-07-16 NOTE — ASSESSMENT & PLAN NOTE
Recent history of osteomyelitis and ankle ulceration and infection treated with antibiotics stable returned home today presents mental status change high fever and shortness of breath.  She will be followed up and sent back to the emergency department for evaluation now in light of clinical status

## 2024-07-16 NOTE — ED PROVIDER NOTES
History  Chief Complaint   Patient presents with    Fever     Pt presents w/ fever & sob; seen at pcp & referred to ED     78-year-old diabetic, hypertensive, and hyperlipidemic female spending extensive time in the hospital due to infected wound of her ankle and has underlying COPD.  She was apparently gradually doing worse over the last 3 days per the daughter who is with her at bedside.  She was having shaking at home for the last 2 days.  She started acting more confused over the last 2 days.  The daughter called primary care doctor and she was seen today, noting that her temperature was 103.7, the physician thought she was somewhat confused, and appeared short of breath.  Pulse ox at his office was 87% on room air.  They directed her here to the emergency room for further evaluation.    She admits to having a cough but no worse than usual.  She smokes a pack of cigarettes per day.  She did no know she was having fevers, but did have the rigors as described.  She was following with podiatry with regards to her wound which apparently started bleeding today, although it seems to have healed completely over prior.  She has significant peripheral vascular disease.  Wound does not appear infected, just draining.  She denies any urinary symptoms.  Denies any productive cough.        Prior to Admission Medications   Prescriptions Last Dose Informant Patient Reported? Taking?   AMILoride 5 mg tablet   Yes No   Apoaequorin 20 MG CAPS  Self No No   Sig: Take 20 mg by mouth in the morning   Aspirin (ASPIR-81 PO)  Self Yes No   Sig: take one by mouth daily   Blood Glucose Monitoring Suppl (OneTouch Verio) w/Device KIT  Self No No   Sig: Use 2 (two) times a day   Fluticasone-Salmeterol (Advair Diskus) 250-50 mcg/dose inhaler   No No   Sig: Inhale 1 puff 2 (two) times a day Rinse mouth after use.   Lancets (OneTouch Delica Plus Ulrluf07O) MISC  Self No No   Sig: TEST TWO TIMES A DAY   Misc. Devices (Carex Coccyx Cushion) MISC   Self No No   Sig: Use in the morning   Omega-3 Fatty Acids (FISH OIL PO)  Self Yes No   Si cap oral daily   Patient not taking: Reported on 2024   OneTouch Verio test strip   Yes No   acetaminophen (TYLENOL) 325 mg tablet  Self No No   Sig: Take 3 tablets (975 mg total) by mouth every 8 (eight) hours   Patient not taking: Reported on 2024   acetaminophen-codeine (TYLENOL with CODEINE #3) 300-30 MG per tablet  Self No No   Sig: Take 1 tablet by mouth every 4 (four) hours as needed for moderate pain   Patient not taking: Reported on 2024   albuterol (Ventolin HFA) 90 mcg/act inhaler   No No   Sig: Inhale 2 puffs every 6 (six) hours as needed for wheezing   ammonium lactate (LAC-HYDRIN) 12 % lotion   No No   Sig: Apply topically daily To dry scaling skin   atorvastatin (LIPITOR) 10 mg tablet   No No   Sig: Take 1 tablet (10 mg total) by mouth daily with dinner   buPROPion (WELLBUTRIN XL) 300 mg 24 hr tablet   No No   Sig: Take 1 tablet (300 mg total) by mouth daily   clonazePAM (KlonoPIN) 1 mg tablet   No No   Sig: Take 1 tablet (1 mg total) by mouth every evening   cyanocobalamin (VITAMIN B-12) 1000 MCG tablet   No No   Sig: Take 1 tablet (1,000 mcg total) by mouth daily   levofloxacin (LEVAQUIN) 500 mg tablet  Self Yes No   lisinopril (ZESTRIL) 5 mg tablet   No No   Sig: Take 1 tablet (5 mg total) by mouth daily   metFORMIN (GLUCOPHAGE) 1000 MG tablet   No No   Sig: Take 1 tablet (1,000 mg total) by mouth 2 (two) times a day with meals   mupirocin (BACTROBAN) 2 % ointment   No No   Sig: Apply topically daily for 7 days To open wounds of RLE   naloxone (NARCAN) 4 mg/0.1 mL nasal spray  Self No No   Sig: Administer 1 spray into a nostril. If no response after 2-3 minutes, give another dose in the other nostril using a new spray.   nicotine (NICODERM CQ) 14 mg/24hr TD 24 hr patch  Self No No   Sig: Place 1 patch on the skin over 24 hours daily   Patient not taking: Reported on 2024    pantoprazole (PROTONIX) 40 mg tablet   No No   Sig: Take 1 tablet (40 mg total) by mouth daily   polyethylene glycol (MIRALAX) 17 g packet  Self No No   Sig: Take 17 g by mouth daily   potassium chloride (Klor-Con M10) 10 mEq tablet   Yes No   pregabalin (LYRICA) 200 MG capsule   No No   Sig: TAKE ONE CAPSULE BY MOUTH THREE TIMES A DAY   rivaroxaban (Xarelto) 2.5 mg tablet   No No   Sig: Take 1 tablet (2.5 mg total) by mouth 2 (two) times a day   solifenacin (VESICARE) 5 mg tablet   No No   Sig: Take 1 tablet (5 mg total) by mouth daily      Facility-Administered Medications: None       Past Medical History:   Diagnosis Date    Anxiety     Closed fracture of coccyx (HCC) 2023    COPD (chronic obstructive pulmonary disease) (HCC)     Diabetes (HCC)     Diabetes mellitus (HCC)     GERD (gastroesophageal reflux disease)     Hyperlipidemia     Hypertension     IBS (irritable bowel syndrome)     Peripheral arterial disease (HCC)     Shoulder fracture     Tobacco use        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;  Surgeon: Cristian Child MD;  Location: [facility];  Service: Urology    EVACUATION OF HEMATOMA Right 2024    Procedure: EVACUATION/ DRAINAGE CHEST WALL  HEMATOMA;  Surgeon: Seth Hoyos MD;  Location: [facility];  Service: Vascular    FL RETROGRADE PYELOGRAM  2023    HERNIA REPAIR      umbilicus w/ mesh    MA BYPASS W/VEIN AXILLARY-FEMORAL Right 2024    Procedure: BYPASS AXILLO-FEMORAL, RIGHT WITH 8MM  RINGED PTFE GRAFT;  Surgeon: Seth oHyos MD;  Location: [facility];  Service: Vascular    SPLIT THICKNESS SKIN GRAFT Right 2/28/2024    Procedure: SKIN GRAFT SPLIT THICKNESS (STSG)  EXTREMITY, Wound vac dressing change;  Surgeon: Rigo Yeboah DPM;  Location: [facility];  Service: Podiatry    WOUND DEBRIDEMENT Right 2/4/2024    Procedure: RIGHT WOUND AND ACHILLES DEBRIDEMENT FOOT/TOE (WASH OUT); PARTIAL AMPUTATION DISTAL 2ND TOE;  Surgeon: Aaron Montero DPM;  Location: [facility];  Service: Podiatry    WOUND DEBRIDEMENT Right 2/14/2024    Procedure: DEBRIDEMENT RIGHT GROIN  WOUND AND WASHOUT, APPLICATION OF WOUND VAC;  Surgeon: Suly Muro DO;  [facility]: [facility];  Service: Vascular    WOUND DEBRIDEMENT Right 2/19/2024    Procedure: DEBRIDEMENT LOWER EXTREMITY, washout;  Surgeon: Suly Muro DO;  [facility]: [facility];  Service: Vascular       Family History   Problem Relation Age of Onset    COPD Mother     Emphysema Mother     COPD Father     Emphysema Father      I have reviewed and agree with the history as documented.    E-Cigarette/Vaping    E-Cigarette Use Never User      E-Cigarette/Vaping Substances    Nicotine No     THC No     CBD No     Flavoring No     Other No     Unknown No      Social History     Tobacco Use    Smoking status: Every Day     Current packs/day: 0.25     Average packs/day: 1 pack/day for 63.5 years (63.1 ttl pk-yrs)     Types: Cigarettes     Start date: 1961    Smokeless tobacco: Never    Tobacco comments:     Quit January 31, 2024   Vaping Use    Vaping status: Never Used   Substance Use Topics    Alcohol use: Not Currently    Drug use: Never       Review of Systems   Constitutional:  Positive for chills and fever.   HENT:  Negative for ear pain, rhinorrhea and sore throat.    Eyes:  Negative for pain, redness and visual disturbance.   Respiratory:  Positive for cough and shortness of breath.    Cardiovascular:  Negative for chest pain and leg  swelling.   Gastrointestinal:  Negative for abdominal pain, diarrhea, nausea and vomiting.   Genitourinary:  Negative for dysuria, flank pain, frequency and urgency.   Musculoskeletal:  Negative for back pain, myalgias and neck pain.   Skin:  Positive for pallor. Negative for rash.   Neurological:  Positive for light-headedness. Negative for dizziness, weakness and headaches.   Hematological: Negative.    Psychiatric/Behavioral:  Positive for confusion. Negative for agitation and suicidal ideas. The patient is not nervous/anxious.    All other systems reviewed and are negative.      Physical Exam  Physical Exam  Vitals and nursing note reviewed.   Constitutional:       General: She is not in acute distress.     Appearance: She is well-developed. She is obese.   HENT:      Head: Normocephalic and atraumatic.      Mouth/Throat:      Mouth: Mucous membranes are dry.   Eyes:      Conjunctiva/sclera: Conjunctivae normal.   Cardiovascular:      Rate and Rhythm: Regular rhythm. Tachycardia present.      Heart sounds: No murmur heard.  Pulmonary:      Effort: Pulmonary effort is normal. No respiratory distress.      Breath sounds: Normal breath sounds.      Comments: Decreased breath sounds bilaterally  Abdominal:      Palpations: Abdomen is soft.      Tenderness: There is no abdominal tenderness.   Musculoskeletal:         General: No swelling.      Cervical back: Neck supple.   Skin:     General: Skin is warm and dry.      Capillary Refill: Capillary refill takes less than 2 seconds.      Comments: Wound on right heel, see picture.   Neurological:      General: No focal deficit present.      Mental Status: She is alert.      Comments: Daughter feels she is confused, answers most questions appropriately but also reaches to her daughter for answers to certain questions.   Psychiatric:         Mood and Affect: Mood normal.         Vital Signs  ED Triage Vitals [07/16/24 1753]   Temperature Pulse Respirations Blood Pressure  SpO2   (!) 100.6 °F (38.1 °C) 102 (!) 30 152/66 (S) (!) 87 %      Temp Source Heart Rate Source Patient Position - Orthostatic VS BP Location FiO2 (%)   Oral Monitor Sitting Left arm --      Pain Score       No Pain           Vitals:    07/16/24 1900 07/16/24 1915 07/16/24 1930 07/16/24 1945   BP:  125/57 142/64 142/64   Pulse: 91 86 92 91   Patient Position - Orthostatic VS:             Visual Acuity      ED Medications  Medications   vancomycin (VANCOCIN) 1,250 mg in sodium chloride 0.9 % 250 mL IVPB (has no administration in time range)   cefepime (MAXIPIME) IVPB (premix in dextrose) 2,000 mg 50 mL (has no administration in time range)   multi-electrolyte (PLASMALYTE-A/ISOLYTE-S PH 7.4) IV solution 1,000 mL (0 mL Intravenous Stopped 7/16/24 1907)     Followed by   multi-electrolyte (PLASMALYTE-A/ISOLYTE-S PH 7.4) IV solution 1,000 mL (0 mL Intravenous Stopped 7/16/24 1912)     Followed by   multi-electrolyte (PLASMALYTE-A/ISOLYTE-S PH 7.4) IV solution 1,000 mL (0 mL Intravenous Stopped 7/16/24 1945)   acetaminophen (TYLENOL) tablet 650 mg (650 mg Oral Given 7/16/24 1835)   cefTRIAXone (ROCEPHIN) IVPB (premix in dextrose) 2,000 mg 50 mL (0 mg Intravenous Stopped 7/16/24 1907)       Diagnostic Studies  Results Reviewed       Procedure Component Value Units Date/Time    COVID/FLU/RSV [[id number]]     Lab Status: No result Specimen: Nares from Nose     B-Type Natriuretic Peptide(BNP) [[id number]]  (Normal) Collected: 07/16/24 1901    Lab Status: Final result Specimen: Blood from Arm, Left Updated: 07/16/24 1926     BNP 22 pg/mL     Comprehensive metabolic panel [[id number]]  (Abnormal) Collected: 07/16/24 1757    Lab Status: Final result Specimen: Blood from Arm, Right Updated: 07/16/24 1839     Sodium 134 mmol/L      Potassium 5.4 mmol/L      Chloride 101 mmol/L      CO2 25 mmol/L      ANION GAP 8 mmol/L      BUN 17 mg/dL      Creatinine 0.67 mg/dL      Glucose 119 mg/dL      Calcium 10.4 mg/dL      AST 43 U/L      ALT  20 U/L      Alkaline Phosphatase 68 U/L      Total Protein 7.2 g/dL      Albumin 4.4 g/dL      Total Bilirubin 0.37 mg/dL      eGFR 85 ml/min/1.73sq m     Narrative:      National Kidney Disease Foundation guidelines for Chronic Kidney Disease (CKD):     Stage 1 with normal or high GFR (GFR > 90 mL/min/1.73 square meters)    Stage 2 Mild CKD (GFR = 60-89 mL/min/1.73 square meters)    Stage 3A Moderate CKD (GFR = 45-59 mL/min/1.73 square meters)    Stage 3B Moderate CKD (GFR = 30-44 mL/min/1.73 square meters)    Stage 4 Severe CKD (GFR = 15-29 mL/min/1.73 square meters)    Stage 5 End Stage CKD (GFR <15 mL/min/1.73 square meters)  Note: GFR calculation is accurate only with a steady state creatinine    FLU/RSV/COVID - if FLU/RSV clinically relevant [[id number]]  (Normal) Collected: 07/16/24 6040    Lab Status: Final result Specimen: Nares from Nose Updated: 07/16/24 1835     SARS-CoV-2 Negative     INFLUENZA A PCR Negative     INFLUENZA B PCR Negative     RSV PCR Negative    Narrative:      FOR PEDIATRIC PATIENTS - copy/paste COVID Guidelines URL to browser: [email or web address]    SARS-CoV-2 assay is a Nucleic Acid Amplification assay intended for the  qualitative detection of nucleic acid from SARS-CoV-2 in nasopharyngeal  swabs. Results are for the presumptive identification of SARS-CoV-2 RNA.    Positive results are indicative of infection with SARS-CoV-2, the virus  causing COVID-19, but do not rule out bacterial infection or co-infection  with other viruses. Laboratories within the [place] and its  territories are required to report all positive results to the appropriate  public health authorities. Negative results do not preclude SARS-CoV-2  infection and should not be used as the sole basis for treatment or other  patient management decisions. Negative results must be combined with  clinical observations, patient history, and epidemiological  information.  This test has not been FDA cleared or approved.    This test has been authorized by FDA under an Emergency Use Authorization  (EUA). This test is only authorized for the duration of time the  declaration that circumstances exist justifying the authorization of the  emergency use of an in vitro diagnostic tests for detection of SARS-CoV-2  virus and/or diagnosis of COVID-19 infection under section 564(b)(1) of  the Act, 21 U.S.C. 360bbb-3(b)(1), unless the authorization is terminated  or revoked sooner. The test has been validated but independent review by FDA  and CLIA is pending.    Test performed using [employer] GeneXpert: This RT-PCR assay targets N2,  a region unique to SARS-CoV-2. A conserved region in the E-gene was chosen  for pan-Sarbecovirus detection which includes SARS-CoV-2.    According to CMS-2020-01-R, this platform meets the definition of high-throughput technology.    APTT [[id number]]  (Normal) Collected: 07/16/24 1757    Lab Status: Final result Specimen: Blood from Arm, Right Updated: 07/16/24 1834     PTT 30 seconds     Protime-INR [[id number]]  (Abnormal) Collected: 07/16/24 1757    Lab Status: Final result Specimen: Blood from Arm, Right Updated: 07/16/24 1834     Protime 15.0 seconds      INR 1.17    Procalcitonin [[id number]]  (Abnormal) Collected: 07/16/24 1757    Lab Status: Final result Specimen: Blood from Arm, Right Updated: 07/16/24 1828     Procalcitonin 0.76 ng/ml     Lactic acid, plasma (w/reflex if result > 2.0) [[id number]]  (Normal) Collected: 07/16/24 1757    Lab Status: Final result Specimen: Blood from Arm, Right Updated: 07/16/24 1822     LACTIC ACID 2.0 mmol/L     Narrative:      Result may be elevated if tourniquet was used during collection.    UA w Reflex to Microscopic w Reflex to Culture [[id number]]     Lab Status: No result Specimen: Urine     Blood culture #1 [[id number]] Collected: 07/16/24 1757    Lab Status: In process Specimen: Blood from Arm, Left Updated:  07/16/24 1810    CBC and differential [[id number]]  (Abnormal) Collected: 07/16/24 1757    Lab Status: Final result Specimen: Blood from Arm, Right Updated: 07/16/24 1806     WBC 19.83 Thousand/uL      RBC 4.99 Million/uL      Hemoglobin 14.4 g/dL      Hematocrit 45.8 %      MCV 92 fL      MCH 28.9 pg      MCHC 31.4 g/dL      RDW 15.6 %      MPV 9.8 fL      Platelets 277 Thousands/uL      nRBC 0 /100 WBCs      Segmented % 89 %      Immature Grans % 1 %      Lymphocytes % 4 %      Monocytes % 6 %      Eosinophils Relative 0 %      Basophils Relative 0 %      Absolute Neutrophils 17.67 Thousands/µL      Absolute Immature Grans 0.11 Thousand/uL      Absolute Lymphocytes 0.85 Thousands/µL      Absolute Monocytes 1.11 Thousand/µL      Eosinophils Absolute 0.05 Thousand/µL      Basophils Absolute 0.04 Thousands/µL     Blood culture #2 [[id number]] Collected: 07/16/24 1757    Lab Status: In process Specimen: Blood from Arm, Right Updated: 07/16/24 1800                   XR chest 1 view portable    (Results Pending)   XR ankle 3+ views RIGHT    (Results Pending)              Procedures  CriticalCare Time    Date/Time: 7/16/2024 7:54 PM    Performed by: Kye Castillo DO  Authorized by: Kye Castillo DO    Critical care provider statement:     Critical care time (minutes):  90    Critical care start time:  7/16/2024 5:51 PM    Critical care end time:  7/16/2024 7:21 PM    Critical care time was exclusive of:  Separately billable procedures and treating other patients    Critical care was necessary to treat or prevent imminent or life-threatening deterioration of the following conditions:  Respiratory failure, sepsis and CNS failure or compromise    Critical care was time spent personally by me on the following activities:  Blood draw for specimens, examination of patient, evaluation of patient's response to treatment, discussions with consultants, development of treatment plan with patient or surrogate, obtaining  history from patient or surrogate, ordering and review of radiographic studies, ordering and review of laboratory studies, re-evaluation of patient's condition and review of old charts  ECG 12 Lead Documentation Only    Date/Time: 7/16/2024 7:58 PM    Performed by: Kye Castillo DO  Authorized by: Kye Castillo DO    Indications / Diagnosis:  Sirs  ECG reviewed by me, the ED Provider: yes    Patient location:  ED  Previous ECG:     Comparison to cardiac monitor: Yes    Interpretation:     Interpretation: abnormal    Rate:     ECG rate:  98    ECG rate assessment: normal    Rhythm:     Rhythm: sinus rhythm    Ectopy:     Ectopy: aberrant    QRS:     QRS axis:  Left    QRS intervals:  Normal  Conduction:     Conduction: normal    ST segments:     ST segments:  Non-specific  T waves:     T waves: non-specific             ED Course  ED Course as of 07/16/24 2000 Tue Jul 16, 2024 1930 Mental status seems to be improving.  White count of 19.8.  Will move to admit, SIRS criteria.  Lactate notably 2 but an elevated Pro-Cristian   1951 Patient's status continues to improve.  Discussed with hospitalist regards to admission.  Blood pressure remained stable.  Heart rate remained stable.  Oxygen levels remained stable on her supplemental O2                                 SBIRT 22yo+      Flowsheet Row Most Recent Value   Initial Alcohol Screen: US AUDIT-C     1. How often do you have a drink containing alcohol? 0 Filed at: 07/16/2024 1753   2. How many drinks containing alcohol do you have on a typical day you are drinking?  0 Filed at: 07/16/2024 1753   3a. Male UNDER 65: How often do you have five or more drinks on one occasion? 0 Filed at: 07/16/2024 1753   3b. FEMALE Any Age, or MALE 65+: How often do you have 4 or more drinks on one occassion? 0 Filed at: 07/16/2024 1753   Audit-C Score 0 Filed at: 07/16/2024 1753   JENNIFER: How many times in the past year have you...    Used an illegal drug or used a prescription  medication for non-medical reasons? Never Filed at: 07/16/2024 1829                      Medical Decision Making  78-year-old diabetic, hypertensive, and hyperlipidemic female spending extensive time in the hospital due to infected wound of her ankle and has underlying COPD.  She was apparently gradually doing worse over the last 3 days per the daughter who is with her at bedside.  She was having shaking at home for the last 2 days.  She started acting more confused over the last 2 days.  The daughter called primary care doctor and she was seen today, noting that her temperature was 103.7, the physician thought she was somewhat confused, and appeared short of breath.  Pulse ox at his office was 87% on room air.  They directed her here to the emergency room for further evaluation.    She admits to having a cough but no worse than usual.  She smokes a pack of cigarettes per day.  She did no know she was having fevers, but did have the rigors as described.  She was following with podiatry with regards to her wound which apparently started bleeding today, although it seems to have healed completely over prior.  She has significant peripheral vascular disease.  Wound does not appear infected, just draining.  She denies any urinary symptoms.  Denies any productive cough.    Underlying mental status changes and history of COPD as well as leg wounds bring up several sources possible for her underlying SIRS picture.  Patient required supplemental oxygen, and IV fluids which seem to bring around.    Amount and/or Complexity of Data Reviewed  Labs: ordered.     Details: Laboratory data revealing slight hyperkalemia, but a white count of 19,000.  Initial lactate only 2.0 with an elevated Pro-Cristian.  Radiology: ordered.     Details: Chest x-ray questionable right lower lobe infiltrate versus overlying fatty tissue.  ECG/medicine tests: ordered and independent interpretation performed.     Details: EKG ordered and independently  interpreted by me revealing normal sinus rhythm, occasional fusion complexes, left axis deviation.  Nonspecific ST-T wave abnormalities.  Abnormal EKG.  Rate of 98.    Risk  Decision regarding hospitalization.  Risk Details: Given her clinical picture of a temperature of 103, 3-day illness, and declining function and mental status, discussed with hospitalist.  She did recover quite a bit with sepsis fluids as well as supplemental O2.  Has underlying COPD, and suspicion for right lower lobe pneumonia.  Initially placed on Rocephin.  She had recent hospitalization which was extensive, which may warrant more intensive antibiotic therapy.  Discussed with hospitalist                 Disposition  Final diagnoses:   Sepsis (HCC)   Right lower lobe pneumonia   Ankle wound   Hyperkalemia     Time reflects when diagnosis was documented in both MDM as applicable and the Disposition within this note       Time User Action Codes Description Comment    7/16/2024  7:48 PM Kye Castillo [A41.9] Sepsis (HCC)     7/16/2024  7:48 PM Kye Castillo [J18.9] Right lower lobe pneumonia     7/16/2024  7:49 PM Kye Castillo [S91.009A] Ankle wound     7/16/2024  7:49 PM Kye Castillo [E87.5] Hyperkalemia           ED Disposition       ED Disposition   Admit    Condition   Stable    Date/Time   Tue Jul 16, 2024 1948    Comment   Case was discussed with Rigo rodrigues  and the patient's admission status was agreed to be Admission Status: inpatient status to the service of Dr. Neal .               Follow-up Information    None         Patient's Medications   Discharge Prescriptions    No medications on file       No discharge procedures on file.    PDMP Review         Value Time User    PDMP Reviewed  Yes 3/13/2024  8:35 PM Ashley Depadua, MD            ED Provider  Electronically Signed by             Kye Castillo DO  07/16/24 2000

## 2024-07-16 NOTE — ASSESSMENT & PLAN NOTE
Patient brought in by her daughter today for same-day visit for acute evaluation due to difficulty with breathing shortness of breath mental status change more confusion.  On exam initially on presentation vitals showed temperature of 103.5 with elevated pulse and respiratory rate and her O2 sat was at 87%.  She was placed on 2 L nasal cannula at this point O2 sat came up to 90% and she is overall lethargic short of breath and is recommended that she go to the emergency department for further evaluation

## 2024-07-16 NOTE — PROGRESS NOTES
Assessment/Plan:       Problem List Items Addressed This Visit          Cardiovascular and Mediastinum    Essential hypertension (Chronic)     Blood pressure 150/64 today.  Continue all medications            Respiratory    Acute respiratory failure with hypoxia (HCC)     Referral to emergency department now daughter will bring her in for evaluation in light of her medical history            Endocrine    Type 2 diabetes mellitus with diabetic polyneuropathy (HCC) (Chronic)       Lab Results   Component Value Date    HGBA1C 7.6 (H) 06/08/2024   Continue all medications and monitor blood sugars accurately in light of recent infection            Nervous and Auditory    Toxic encephalopathy - Primary     Patient brought in by her daughter today for same-day visit for acute evaluation due to difficulty with breathing shortness of breath mental status change more confusion.  On exam initially on presentation vitals showed temperature of 103.5 with elevated pulse and respiratory rate and her O2 sat was at 87%.  She was placed on 2 L nasal cannula at this point O2 sat came up to 90% and she is overall lethargic short of breath and is recommended that she go to the emergency department for further evaluation            Musculoskeletal and Integument    Ankle ulcer, right, with fat layer exposed (HCC)     Recent history of osteomyelitis and ankle ulceration and infection treated with antibiotics stable returned home today presents mental status change high fever and shortness of breath.  She will be followed up and sent back to the emergency department for evaluation now in light of clinical status              Subjective:      Patient ID: Lona Cedeno is a 78 y.o. female.    Patient brought in today for same-day appointment acutely sick        The following portions of the patient's history were reviewed and updated as appropriate: allergies, current medications, past family history, past medical history, past social  history, past surgical history and problem list.    Review of Systems   Constitutional:  Negative for chills, fatigue and fever.   HENT:  Negative for congestion, nosebleeds, rhinorrhea, sinus pressure and sore throat.    Eyes:  Negative for discharge and redness.   Respiratory:  Positive for shortness of breath. Negative for cough.    Cardiovascular:  Negative for chest pain, palpitations and leg swelling.   Gastrointestinal:  Negative for abdominal pain, blood in stool and nausea.   Endocrine: Negative for cold intolerance, heat intolerance and polyuria.   Genitourinary:  Negative for dysuria and frequency.   Musculoskeletal:  Negative for arthralgias, back pain and myalgias.   Skin:  Negative for rash.   Neurological:  Negative for dizziness, weakness and headaches.   Hematological:  Negative for adenopathy.   Psychiatric/Behavioral:  Negative for behavioral problems and sleep disturbance. The patient is not nervous/anxious.          Objective:      /64 (BP Location: Left arm, Patient Position: Sitting, Cuff Size: Standard)   Pulse 105   Temp (!) 103.5 °F (39.7 °C) (Tympanic)   Resp (!) 24   Wt 76.2 kg (168 lb)   SpO2 90% Comment: o2 @2L  BMI 31.74 kg/m²        Physical Exam  Vitals and nursing note reviewed.   Constitutional:       General: She is not in acute distress.     Appearance: She is well-developed.      Comments: Mental status change mildly confused   HENT:      Head: Normocephalic and atraumatic.      Right Ear: External ear normal.      Left Ear: External ear normal.      Nose: Nose normal.      Mouth/Throat:      Mouth: Mucous membranes are moist.      Pharynx: Oropharynx is clear. No oropharyngeal exudate.   Eyes:      General: No scleral icterus.        Right eye: No discharge.         Left eye: No discharge.      Conjunctiva/sclera: Conjunctivae normal.      Pupils: Pupils are equal, round, and reactive to light.   Neck:      Thyroid: No thyromegaly.      Vascular: No JVD.    Cardiovascular:      Rate and Rhythm: Normal rate and regular rhythm.      Heart sounds: Normal heart sounds. No murmur heard.  Pulmonary:      Effort: Respiratory distress present.      Breath sounds: Rhonchi present. No wheezing or rales.   Chest:      Chest wall: No tenderness.   Abdominal:      General: Bowel sounds are normal. There is no distension.      Palpations: Abdomen is soft. There is no mass.      Tenderness: There is no abdominal tenderness.   Musculoskeletal:         General: No tenderness or deformity. Normal range of motion.      Cervical back: Normal range of motion.   Lymphadenopathy:      Cervical: No cervical adenopathy.   Skin:     General: Skin is warm and dry.      Findings: No rash.   Neurological:      General: No focal deficit present.      Mental Status: She is alert and oriented to person, place, and time.      Cranial Nerves: No cranial nerve deficit.      Coordination: Coordination normal.      Deep Tendon Reflexes: Reflexes are normal and symmetric. Reflexes normal.   Psychiatric:         Mood and Affect: Mood normal.         Behavior: Behavior normal.         Thought Content: Thought content normal.         Judgment: Judgment normal.          Data:    Laboratory Results: I have personally reviewed the pertinent laboratory results/reports   Radiology/Other Diagnostic Testing Results: I have personally reviewed pertinent reports.       Lab Results   Component Value Date    WBC 9.46 05/08/2024    HGB 13.5 05/08/2024    HCT 45.1 05/08/2024    MCV 99 (H) 05/08/2024     (H) 05/08/2024     Lab Results   Component Value Date     05/21/2018    K 4.9 06/08/2024     06/08/2024    CO2 28 06/08/2024    ANIONGAP 14.0 05/21/2018    BUN 13 06/08/2024    CREATININE 0.47 (L) 06/08/2024    GLUCOSE 143 (H) 01/31/2024    GLUF 148 (H) 06/08/2024    CALCIUM 9.8 06/08/2024    CORRECTEDCA 10.1 02/05/2024    AST 14 06/08/2024    ALT 7 06/08/2024    ALKPHOS 71 06/08/2024    PROT 6.4  "05/21/2018    BILITOT 0.4 05/21/2018    EGFR 95 06/08/2024     Lab Results   Component Value Date    CHOLESTEROL 139 06/08/2024    CHOLESTEROL 167 11/07/2023    CHOLESTEROL 138 07/05/2023     Lab Results   Component Value Date    HDL 55 06/08/2024    HDL 48 (L) 11/07/2023    HDL 40 (L) 07/05/2023     Lab Results   Component Value Date    LDLCALC 57 06/08/2024    LDLCALC 80 11/07/2023    LDLCALC 48 07/05/2023     Lab Results   Component Value Date    TRIG 135 06/08/2024    TRIG 193 (H) 11/07/2023    TRIG 248 (H) 07/05/2023     No results found for: \"CHOLHDL\"  Lab Results   Component Value Date    UBZ0XKZFFHYQ 3.415 06/08/2024     Lab Results   Component Value Date    HGBA1C 7.6 (H) 06/08/2024     No results found for: \"PSA\"    Vivek Preston, DO      "

## 2024-07-17 PROBLEM — J18.9 SEPSIS DUE TO PNEUMONIA (HCC): Status: ACTIVE | Noted: 2024-07-17

## 2024-07-17 PROBLEM — E87.5 HYPERKALEMIA: Status: ACTIVE | Noted: 2024-07-17

## 2024-07-17 PROBLEM — G93.41 ACUTE METABOLIC ENCEPHALOPATHY: Status: ACTIVE | Noted: 2024-07-17

## 2024-07-17 PROBLEM — A41.9 SEPSIS DUE TO PNEUMONIA (HCC): Status: ACTIVE | Noted: 2024-07-17

## 2024-07-17 PROBLEM — S91.301A OPEN WOUND OF RIGHT FOOT: Status: ACTIVE | Noted: 2024-07-17

## 2024-07-17 LAB
ANION GAP SERPL CALCULATED.3IONS-SCNC: 3 MMOL/L (ref 4–13)
ANISOCYTOSIS BLD QL SMEAR: PRESENT
BASOPHILS # BLD MANUAL: 0 THOUSAND/UL (ref 0–0.1)
BASOPHILS NFR MAR MANUAL: 0 % (ref 0–1)
BUN SERPL-MCNC: 10 MG/DL (ref 5–25)
CALCIUM SERPL-MCNC: 9.5 MG/DL (ref 8.4–10.2)
CHLORIDE SERPL-SCNC: 99 MMOL/L (ref 96–108)
CO2 SERPL-SCNC: 34 MMOL/L (ref 21–32)
CREAT SERPL-MCNC: 0.59 MG/DL (ref 0.6–1.3)
EOSINOPHIL # BLD MANUAL: 0.18 THOUSAND/UL (ref 0–0.4)
EOSINOPHIL NFR BLD MANUAL: 1 % (ref 0–6)
ERYTHROCYTE [DISTWIDTH] IN BLOOD BY AUTOMATED COUNT: 15.9 % (ref 11.6–15.1)
GFR SERPL CREATININE-BSD FRML MDRD: 88 ML/MIN/1.73SQ M
GLUCOSE SERPL-MCNC: 116 MG/DL (ref 65–140)
GLUCOSE SERPL-MCNC: 124 MG/DL (ref 65–140)
GLUCOSE SERPL-MCNC: 139 MG/DL (ref 65–140)
GLUCOSE SERPL-MCNC: 172 MG/DL (ref 65–140)
HCT VFR BLD AUTO: 40.4 % (ref 34.8–46.1)
HGB BLD-MCNC: 12.6 G/DL (ref 11.5–15.4)
L PNEUMO1 AG UR QL IA.RAPID: NEGATIVE
LYMPHOCYTES # BLD AUTO: 0.89 THOUSAND/UL (ref 0.6–4.47)
LYMPHOCYTES # BLD AUTO: 5 % (ref 14–44)
MCH RBC QN AUTO: 28.8 PG (ref 26.8–34.3)
MCHC RBC AUTO-ENTMCNC: 31.2 G/DL (ref 31.4–37.4)
MCV RBC AUTO: 92 FL (ref 82–98)
MONOCYTES # BLD AUTO: 0.35 THOUSAND/UL (ref 0–1.22)
MONOCYTES NFR BLD: 2 % (ref 4–12)
NEUTROPHILS # BLD MANUAL: 16.31 THOUSAND/UL (ref 1.85–7.62)
NEUTS SEG NFR BLD AUTO: 92 % (ref 43–75)
PLATELET # BLD AUTO: 209 THOUSANDS/UL (ref 149–390)
PLATELET BLD QL SMEAR: ADEQUATE
PMV BLD AUTO: 9.7 FL (ref 8.9–12.7)
POTASSIUM SERPL-SCNC: 5.3 MMOL/L (ref 3.5–5.3)
PROCALCITONIN SERPL-MCNC: 0.86 NG/ML
RBC # BLD AUTO: 4.37 MILLION/UL (ref 3.81–5.12)
RBC MORPH BLD: PRESENT
S PNEUM AG UR QL: NEGATIVE
SODIUM SERPL-SCNC: 136 MMOL/L (ref 135–147)
WBC # BLD AUTO: 17.73 THOUSAND/UL (ref 4.31–10.16)

## 2024-07-17 PROCEDURE — 85027 COMPLETE CBC AUTOMATED: CPT | Performed by: PHYSICIAN ASSISTANT

## 2024-07-17 PROCEDURE — 80048 BASIC METABOLIC PNL TOTAL CA: CPT | Performed by: PHYSICIAN ASSISTANT

## 2024-07-17 PROCEDURE — 99223 1ST HOSP IP/OBS HIGH 75: CPT | Performed by: PHYSICIAN ASSISTANT

## 2024-07-17 PROCEDURE — 94664 DEMO&/EVAL PT USE INHALER: CPT

## 2024-07-17 PROCEDURE — 94760 N-INVAS EAR/PLS OXIMETRY 1: CPT

## 2024-07-17 PROCEDURE — 99222 1ST HOSP IP/OBS MODERATE 55: CPT | Performed by: PODIATRIST

## 2024-07-17 PROCEDURE — 84145 PROCALCITONIN (PCT): CPT | Performed by: PHYSICIAN ASSISTANT

## 2024-07-17 PROCEDURE — 36415 COLL VENOUS BLD VENIPUNCTURE: CPT | Performed by: PHYSICIAN ASSISTANT

## 2024-07-17 PROCEDURE — 82948 REAGENT STRIP/BLOOD GLUCOSE: CPT

## 2024-07-17 PROCEDURE — 85007 BL SMEAR W/DIFF WBC COUNT: CPT | Performed by: PHYSICIAN ASSISTANT

## 2024-07-17 RX ADMIN — CEFEPIME HYDROCHLORIDE 2000 MG: 2 INJECTION, SOLUTION INTRAVENOUS at 06:20

## 2024-07-17 RX ADMIN — NICOTINE 1 PATCH: 21 PATCH, EXTENDED RELEASE TRANSDERMAL at 09:04

## 2024-07-17 RX ADMIN — MUPIROCIN: 20 OINTMENT TOPICAL at 09:08

## 2024-07-17 RX ADMIN — BUPROPION HYDROCHLORIDE 300 MG: 150 TABLET, EXTENDED RELEASE ORAL at 09:03

## 2024-07-17 RX ADMIN — FLUTICASONE FUROATE AND VILANTEROL TRIFENATATE 1 PUFF: 100; 25 POWDER RESPIRATORY (INHALATION) at 09:05

## 2024-07-17 RX ADMIN — ASPIRIN 81 MG: 81 TABLET, COATED ORAL at 09:03

## 2024-07-17 RX ADMIN — CEFEPIME HYDROCHLORIDE 2000 MG: 2 INJECTION, SOLUTION INTRAVENOUS at 17:04

## 2024-07-17 RX ADMIN — PREGABALIN 200 MG: 100 CAPSULE ORAL at 21:45

## 2024-07-17 RX ADMIN — OXYBUTYNIN 5 MG: 5 TABLET, FILM COATED, EXTENDED RELEASE ORAL at 09:07

## 2024-07-17 RX ADMIN — VANCOMYCIN HYDROCHLORIDE 750 MG: 750 INJECTION, SOLUTION INTRAVENOUS at 18:26

## 2024-07-17 RX ADMIN — PREGABALIN 200 MG: 100 CAPSULE ORAL at 09:03

## 2024-07-17 RX ADMIN — VANCOMYCIN HYDROCHLORIDE 750 MG: 750 INJECTION, SOLUTION INTRAVENOUS at 07:13

## 2024-07-17 RX ADMIN — PREGABALIN 200 MG: 100 CAPSULE ORAL at 17:02

## 2024-07-17 RX ADMIN — LISINOPRIL 5 MG: 5 TABLET ORAL at 09:03

## 2024-07-17 RX ADMIN — SODIUM CHLORIDE 125 ML/HR: 0.9 INJECTION, SOLUTION INTRAVENOUS at 18:27

## 2024-07-17 RX ADMIN — CYANOCOBALAMIN TAB 500 MCG 1000 MCG: 500 TAB at 09:03

## 2024-07-17 RX ADMIN — ATORVASTATIN CALCIUM 10 MG: 10 TABLET, FILM COATED ORAL at 17:02

## 2024-07-17 RX ADMIN — AMILORIDE HYDROCLORIDE 5 MG: 5 TABLET ORAL at 09:03

## 2024-07-17 RX ADMIN — RIVAROXABAN 2.5 MG: 2.5 TABLET, FILM COATED ORAL at 17:02

## 2024-07-17 RX ADMIN — INSULIN LISPRO 1 UNITS: 100 INJECTION, SOLUTION INTRAVENOUS; SUBCUTANEOUS at 21:48

## 2024-07-17 RX ADMIN — RIVAROXABAN 2.5 MG: 2.5 TABLET, FILM COATED ORAL at 09:04

## 2024-07-17 RX ADMIN — PANTOPRAZOLE SODIUM 40 MG: 40 TABLET, DELAYED RELEASE ORAL at 09:03

## 2024-07-17 RX ADMIN — Medication: at 09:09

## 2024-07-17 RX ADMIN — POLYETHYLENE GLYCOL 3350 17 G: 17 POWDER, FOR SOLUTION ORAL at 09:03

## 2024-07-17 NOTE — ASSESSMENT & PLAN NOTE
Admit to medicine  Trend lactic acid and procalcitonin  Hydrate with normal saline 125 cc/h  Blood cultures are currently pending  We will give cefepime 2 g IV every 12 hours and vancomycin 750 mg IV every 12 hours with pharmacy to dose  Will obtain urine for strep pneumo and Legionella  Patient met sepsis criteria in that she was febrile with a Tmax of 100.6, tachycardic with a heart rate as high as 102, tachypneic with a respiratory rate as high as 30, leukocytosis of 19.83 and a known source of infection being right lower lobe pneumonia

## 2024-07-17 NOTE — RESPIRATORY THERAPY NOTE
RT Protocol Note  Lona Cedeno 78 y.o. female MRN: [id number]  Unit/Bed#: ED 01 Encounter: [id number]    Assessment    Principal Problem:    Sepsis due to pneumonia ([facility])  Active Problems:    Type 2 diabetes mellitus with diabetic polyneuropathy (HCC)    Essential hypertension    Anxiety and depression    GERD without esophagitis    COPD, severity to be determined ([facility])    Tobacco use disorder    Anemia    Acute respiratory failure with hypoxia ([facility])    Open wound of right foot    Hyperkalemia    Acute metabolic encephalopathy      Home Pulmonary Medications:    Home Devices/Therapy: (P) Other (Comment) (alb MDi prn advair BID no other resp therapy)    Past Medical History:   Diagnosis Date    Anxiety     Closed fracture of coccyx ([facility]) 05/24/2023    COPD (chronic obstructive pulmonary disease) (HCC)     Diabetes (HCC)     Diabetes mellitus (HCC)     GERD (gastroesophageal reflux disease)     Hyperlipidemia     Hypertension     IBS (irritable bowel syndrome)     Peripheral arterial disease ([facility])     Shoulder fracture     Tobacco use      Social History     Socioeconomic History    Marital status: /Civil Union     Spouse name: None    Number of children: None    Years of education: None    Highest education level: None   Occupational History    None   Tobacco Use    Smoking status: Every Day     Current packs/day: 0.25     Average packs/day: 1 pack/day for 63.5 years (63.1 ttl pk-yrs)     Types: Cigarettes     Start date: 1961    Smokeless tobacco: Never    Tobacco comments:     Quit January 31, 2024   Vaping Use    Vaping status: Never Used   Substance and Sexual Activity    Alcohol use: Not Currently    Drug use: Never    Sexual activity: Not Currently   Other Topics Concern    None   Social History Narrative    None     Social Determinants of Health     Financial Resource Strain: Low Risk  (2/9/2023)    Overall Financial Resource Strain (CARDIA)     Difficulty of Paying Living Expenses: Not hard at all  "  Food Insecurity: No Food Insecurity (4/18/2024)    Hunger Vital Sign     Worried About Running Out of Food in the Last Year: Never true     Ran Out of Food in the Last Year: Never true   Transportation Needs: No Transportation Needs (4/18/2024)    PRAPARE - Transportation     Lack of Transportation (Medical): No     Lack of Transportation (Non-Medical): No   Physical Activity: Not on file   Stress: Not on file   Social Connections: Unknown (6/18/2024)    Received from [employer]    Social [employer]     How often do you feel lonely or isolated from those around you? (Adult - for ages 18 years and over): Not on file   Intimate Partner Violence: Not on file   Housing Stability: Low Risk  (4/18/2024)    Housing Stability Vital Sign     Unable to Pay for Housing in the Last Year: No     Number of Times Moved in the Last Year: 1     Homeless in the Last Year: No       Subjective         Objective    Physical Exam:   Assessment Type: (P) Assess only  General Appearance: (P) Drowsy, Sleeping  Respiratory Pattern: (P) Normal  Chest Assessment: (P) Chest expansion symmetrical  Bilateral Breath Sounds: (P) Clear  O2 Device: (P) nc    Vitals:  Blood pressure 118/64, pulse (P) 72, temperature 97.9 °F (36.6 °C), temperature source Temporal, resp. rate (P) 16, height 5' 1\" (1.549 m), weight 76.2 kg (168 lb), SpO2 (P) 94%.          Imaging and other studies: I have personally reviewed pertinent reports.      O2 Device: (P) nc     Plan    Respiratory Plan: (P) Home Bronchodilator Patient pathway        Resp Comments: (P) pt sleepy did not wake fully no resp distress, pt admitted for covid +, pt uses Mdi alb prn at home Advair BID, pt on NC 2 l/m pox 94% BS CTA, will cont with MDi'S as ordered no resp protocol needed at this time will dc   "

## 2024-07-17 NOTE — RESPIRATORY THERAPY NOTE
RT Protocol Note  Lona Cedeno 78 y.o. female MRN: [id number]  Unit/Bed#: ED 01 Encounter: [id number]    Assessment    Principal Problem:    Sepsis due to pneumonia ([facility])  Active Problems:    Type 2 diabetes mellitus with diabetic polyneuropathy (HCC)    Essential hypertension    Anxiety and depression    GERD without esophagitis    COPD, severity to be determined ([facility])    Tobacco use disorder    Anemia    Acute respiratory failure with hypoxia ([facility])    Open wound of right foot    Hyperkalemia    Acute metabolic encephalopathy      Home Pulmonary Medications:    Home Devices/Therapy: Other (Comment) (alb MDi prn advair BID no other resp therapy)    Past Medical History:   Diagnosis Date    Anxiety     Closed fracture of coccyx ([facility]) 05/24/2023    COPD (chronic obstructive pulmonary disease) (HCC)     Diabetes (HCC)     Diabetes mellitus (HCC)     GERD (gastroesophageal reflux disease)     Hyperlipidemia     Hypertension     IBS (irritable bowel syndrome)     Peripheral arterial disease ([facility])     Shoulder fracture     Tobacco use      Social History     Socioeconomic History    Marital status: /Civil Union     Spouse name: None    Number of children: None    Years of education: None    Highest education level: None   Occupational History    None   Tobacco Use    Smoking status: Every Day     Current packs/day: 0.25     Average packs/day: 1 pack/day for 63.5 years (63.1 ttl pk-yrs)     Types: Cigarettes     Start date: 1961    Smokeless tobacco: Never    Tobacco comments:     Quit January 31, 2024   Vaping Use    Vaping status: Never Used   Substance and Sexual Activity    Alcohol use: Not Currently    Drug use: Never    Sexual activity: Not Currently   Other Topics Concern    None   Social History Narrative    None     Social Determinants of Health     Financial Resource Strain: Low Risk  (2/9/2023)    Overall Financial Resource Strain (CARDIA)     Difficulty of Paying Living Expenses: Not hard at all   Food  "Insecurity: No Food Insecurity (4/18/2024)    Hunger Vital Sign     Worried About Running Out of Food in the Last Year: Never true     Ran Out of Food in the Last Year: Never true   Transportation Needs: No Transportation Needs (4/18/2024)    PRAPARE - Transportation     Lack of Transportation (Medical): No     Lack of Transportation (Non-Medical): No   Physical Activity: Not on file   Stress: Not on file   Social Connections: Unknown (6/18/2024)    Received from [employer]    Social [employer]     How often do you feel lonely or isolated from those around you? (Adult - for ages 18 years and over): Not on file   Intimate Partner Violence: Not on file   Housing Stability: Low Risk  (4/18/2024)    Housing Stability Vital Sign     Unable to Pay for Housing in the Last Year: No     Number of Times Moved in the Last Year: 1     Homeless in the Last Year: No       Subjective         Objective    Physical Exam:   Assessment Type: Assess only  General Appearance: Drowsy, Sleeping  Respiratory Pattern: Normal  Chest Assessment: Chest expansion symmetrical  Bilateral Breath Sounds: Clear  O2 Device: nc    Vitals:  Blood pressure 118/64, pulse 72, temperature 97.9 °F (36.6 °C), temperature source Temporal, resp. rate 16, height 5' 1\" (1.549 m), weight 76.2 kg (168 lb), SpO2 94%.          Imaging and other studies: I have personally reviewed pertinent reports.      O2 Device: nc     Plan    Respiratory Plan: Home Bronchodilator Patient pathway        Resp Comments: pt sleepy did not wake fully no resp distress, pt admitted for pna, pt uses Mdi alb prn at home Advair BID, pt on NC 2 l/m pox 94% BS CTA, will cont with MDi'S as ordered no resp protocol needed at this time will dc   "

## 2024-07-17 NOTE — ASSESSMENT & PLAN NOTE
Likely secondary to sepsis being treated as per above  We will continue to monitor with neurochecks every shift

## 2024-07-17 NOTE — PROGRESS NOTES
Lona Cedeno is a 78 y.o. female who is currently ordered Vancomycin IV with management by the Pharmacy Consult service.  Relevant clinical data and objective / subjective history reviewed.  Vancomycin Assessment:  Indication and Goal AUC/Trough: Soft tissue (goal -600, trough >10); Pneumonia (goal -600, trough >10)  Clinical Status: stable  Micro:   Cx pending  Renal Function:  SCr: 0.67 mg/dL  CrCl: 65.7 mL/min  Renal replacement: Not on dialysis  Days of Therapy: 1  Current Dose: new start  Vancomycin Plan:  New Dosin mg IV q12  Estimated AUC: 474 mcg*hr/mL  Estimated Trough: 14.4 mcg/mL  Next Level: 7/18 am  Renal Function Monitoring: Daily BMP and UOP  Pharmacy will continue to follow closely for s/sx of nephrotoxicity, infusion reactions and appropriateness of therapy.  BMP and CBC will be ordered per protocol. We will continue to follow the patient’s culture results and clinical progress daily.    Wil Valderrama, Pharmacist

## 2024-07-17 NOTE — ASSESSMENT & PLAN NOTE
Lab Results   Component Value Date    HGBA1C 7.6 (H) 06/08/2024       Recent Labs     07/16/24  2226   POCGLU 144*       Blood Sugar Average: Last 72 hrs:  (P) 144    Placed on [facility] type II diet  Hold prehospital oral antihyperglycemic's  Obtain Accu-Cheks before meals and at bedtime with Humalog correction dose before meals and at bedtime

## 2024-07-17 NOTE — QUICK NOTE
Sepsis alert called-no new orders at this time since the patient has a known diagnosis of sepsis, and treatment is in place

## 2024-07-17 NOTE — ASSESSMENT & PLAN NOTE
Patient is requiring 3 L nasal cannula maintain O2 saturation of 90%  Likely secondary to pneumonia being treated as per above  Will place on O2 and respiratory protocol  Continue prehospital respiratory medications

## 2024-07-17 NOTE — UTILIZATION REVIEW
Initial Clinical Review    Admission: Date/Time/Statement:   Admission Orders (From admission, onward)       Ordered        07/16/24 1950  INPATIENT ADMISSION  Once                          Orders Placed This Encounter   Procedures    INPATIENT ADMISSION     Standing Status:   Standing     Number of Occurrences:   1     Order Specific Question:   Level of Care     Answer:   Med Surg [16]     Order Specific Question:   Estimated length of stay     Answer:   More than 2 Midnights     Order Specific Question:   Certification     Answer:   I certify that inpatient services are medically necessary for this patient for a duration of greater than two midnights. See H&P and MD Progress Notes for additional information about the patient's course of treatment.     ED Arrival Information       Expected   -    Arrival   7/16/2024 17:45    Acuity   Emergent              Means of arrival   Walk-In    Escorted by   Family Member    Service   Hospitalist    Admission type   Emergency              Arrival complaint   sob & fever             Chief Complaint   Patient presents with    Fever     Pt presents w/ fever & sob; seen at pcp & referred to ED       Initial Presentation: 78 y.o. female to the ED from PCP with complaints of fever, shortness of breath, confusion. H/O hypertensive, and hyperlipidemic, DM, cigarette smoker a pack  day.  Admitted to inpatient for sepsis due to pneumonia, acute respiratory failure with hypoxia. ON arrival,tachycardic, tachypneic. Wound on right heel.  Breath sounds decreased.  PUlse ox 87% on room air. WBCs 19.83 on arrival. Blood cultures pending. Started on IV abx. Placed on 2 LNC. Given 3 liters IV fluids. H/O chronic right ankle wound, treated for osteomyelitis and hospitalized for 2 months this past year.  Wound had completely closed up, but opened 3 days ago. Follows with wound care. Podiatry consult.     Anticipated Length of Stay/Certification Statement: Patient will be admitted on an  Inpatient basis with an anticipated length of stay of  > 2 midnights.   Justification for Hospital Stay: Sepsis secondary to right lower lobe pneumonia requiring IV antibiotics and further monitoring     Date: 7/17   Day 2: Continue with IV abx, right basal pneumonia.  Follow up on blood culture results.     Podiatry:ulceration right achilles.  Check arterial studies.  H/O vascular intervention RLE, partial toe amputation RLE.  Ulceration on the right skin right posterior Achilles, dry scab in place, no signs of cellulitis, minimal drainage     Date: 7/18  Day 3: Has surpassed a 2nd midnight with active treatments and services.  Admitted to inpatient for sepsis due to pneumonia.  DC vanco, continue with IV cefepime. DC ivfluids.  Status post partial amputation of the right foot, right foot dressing is in place, dry, and intact otherwise.  Lungs clear.         ED Triage Vitals [07/16/24 1753]   Temperature Pulse Respirations Blood Pressure SpO2 Pain Score   (!) 100.6 °F (38.1 °C) 102 (!) 30 152/66 (S) (!) 87 % No Pain     Weight (last 2 days)       Date/Time Weight    07/16/24 1753 76.2 (168)            Vital Signs (last 3 days)       Date/Time Temp Pulse Resp BP MAP (mmHg) SpO2 Calculated FIO2 (%) - Nasal Cannula Nasal Cannula O2 Flow Rate (L/min) O2 Device Patient Position - Orthostatic VS Pain    07/17/24 0700 98.5 °F (36.9 °C) 94 18 122/74 -- 94 % 32 3 L/min Nasal cannula Sitting No Pain    07/17/24 0430 -- 72 16 -- -- 94 % 32 3 L/min Nasal cannula -- --    07/17/24 0415 97.9 °F (36.6 °C) 72 20 118/64 -- -- 32 3 L/min Nasal cannula Sitting No Pain    07/17/24 0200 98.5 °F (36.9 °C) 75 20 122/71 -- 92 % 32 3 L/min Nasal cannula Sitting No Pain    07/16/24 2230 -- 84 -- 113/56 80 90 % -- -- -- -- --    07/16/24 2200 -- 82 -- 116/50 72 93 % -- -- -- -- --    07/16/24 2145 -- 83 -- 128/57 82 91 % -- -- -- -- --    07/16/24 2130 -- 84 -- 128/57 82 90 % -- -- -- -- --    07/16/24 2115 -- 85 -- 129/57 82 92 % -- -- --  -- --    07/16/24 2100 -- 86 -- -- -- 91 % -- -- -- -- --    07/16/24 2045 -- 87 -- -- -- 90 % -- -- -- -- --    07/16/24 2030 -- 87 -- 111/54 78 91 % -- -- -- -- --    07/16/24 1945 -- 91 -- 142/64 92 95 % -- -- -- -- --    07/16/24 1930 -- 92 -- 142/64 92 71 % -- -- -- -- --    07/16/24 1915 -- 86 -- 125/57 82 91 % -- -- -- -- --    07/16/24 1912 -- -- -- -- -- -- -- -- -- -- No Pain    07/16/24 1900 -- 91 -- -- -- 91 % -- -- -- -- --    07/16/24 1845 -- 91 -- 132/63 91 92 % -- -- -- -- --    07/16/24 1835 -- -- -- -- -- -- -- -- -- -- Med Not Given for Pain - for MAR use only    07/16/24 1830 -- 97 22 132/63 91 81 % -- -- -- -- --    07/16/24 1800 -- 99 28 149/65 94 92 % -- -- Nasal cannula Sitting --    07/16/24 1754 -- -- -- -- -- 93 % 32 3 L/min  Nasal cannula  -- --    07/16/24 1753 100.6 °F (38.1 °C) 102 30 152/66 -- 87 %  -- -- None (Room air) Sitting No Pain              Pertinent Labs/Diagnostic Test Results:   Radiology:  XR ankle 3+ views RIGHT   Final Interpretation by Eugene Molina MD (07/17 2865)      No acute osseous abnormality.         Computerized Assisted Algorithm (CAA) may have been used to analyze all applicable images.               Workstation performed: [id number]         XR chest 1 view portable   Final Interpretation by Joanne Flores MD (07/16 2139)      Mild opacity in the right base, question atelectasis versus pneumonia.      Chronic increase in interstitial lung markings.            Workstation performed: [id number]                 Results from last 7 days   Lab Units 07/16/24 2018 07/16/24  1753   SARS-COV-2  Negative Negative     Results from last 7 days   Lab Units 07/17/24 0622 07/16/24  1757   WBC Thousand/uL 17.73* 19.83*   HEMOGLOBIN g/dL 12.6 14.4   HEMATOCRIT % 40.4 45.8   PLATELETS Thousands/uL 209 277   TOTAL NEUT ABS Thousands/µL  --  17.67*         Results from last 7 days   Lab Units 07/17/24 0622 07/16/24  1757   SODIUM mmol/L 136 134*   POTASSIUM  mmol/L 5.3 5.4*   CHLORIDE mmol/L 99 101   CO2 mmol/L 34* 25   ANION GAP mmol/L 3* 8   BUN mg/dL 10 17   CREATININE mg/dL 0.59* 0.67   EGFR ml/min/1.73sq m 88 85   CALCIUM mg/dL 9.5 10.4*     Results from last 7 days   Lab Units 07/16/24  1757   AST U/L 43*   ALT U/L 20   ALK PHOS U/L 68   TOTAL PROTEIN g/dL 7.2   ALBUMIN g/dL 4.4   TOTAL BILIRUBIN mg/dL 0.37     Results from last 7 days   Lab Units 07/16/24  2226   POC GLUCOSE mg/dl 144*     Results from last 7 days   Lab Units 07/17/24 0622 07/16/24  1757   GLUCOSE RANDOM mg/dL 116 119           Results from last 7 days   Lab Units 07/16/24  1757   PROTIME seconds 15.0*   INR  1.17   PTT seconds 30         Results from last 7 days   Lab Units 07/17/24 0622 07/16/24  1757   PROCALCITONIN ng/ml 0.86* 0.76*     Results from last 7 days   Lab Units 07/16/24  1757   LACTIC ACID mmol/L 2.0             Results from last 7 days   Lab Units 07/16/24  1901   BNP pg/mL 22       Results from last 7 days   Lab Units 07/16/24  2236   CLARITY UA  Clear   COLOR UA  Yellow   SPEC GRAV UA  1.015   PH UA  7.5   GLUCOSE UA mg/dl Negative   KETONES UA mg/dl Negative   BLOOD UA  Negative   PROTEIN UA mg/dl Negative   NITRITE UA  Negative   BILIRUBIN UA  Negative   UROBILINOGEN UA E.U./dl 0.2   LEUKOCYTES UA  Negative     Results from last 7 days   Lab Units 07/16/24 2236 07/16/24 2018 07/16/24  1753   STREP PNEUMONIAE ANTIGEN, URINE  Negative  --   --    LEGIONELLA URINARY ANTIGEN  Negative  --   --    INFLUENZA A PCR   --  Negative Negative   INFLUENZA B PCR   --  Negative Negative   RSV PCR   --  Negative Negative       Results from last 7 days   Lab Units 07/16/24  1757   BLOOD CULTURE  Received in Microbiology Lab. Culture in Progress.  Received in Microbiology Lab. Culture in Progress.       ED Treatment-Medication Administration from 07/16/2024 1745 to 07/17/2024 0901         Date/Time Order Dose Route Action     07/16/2024 1826 multi-electrolyte (PLASMALYTE-A/ISOLYTE-S PH  7.4) IV solution 1,000 mL 1,000 mL Intravenous New Bag     07/16/2024 1912 multi-electrolyte (PLASMALYTE-A/ISOLYTE-S PH 7.4) IV solution 1,000 mL 1,000 mL Intravenous New Bag     07/16/2024 1911 multi-electrolyte (PLASMALYTE-A/ISOLYTE-S PH 7.4) IV solution 1,000 mL 1,000 mL Intravenous New Bag     07/16/2024 1835 acetaminophen (TYLENOL) tablet 650 mg 650 mg Oral Given     07/16/2024 1836 cefTRIAXone (ROCEPHIN) IVPB (premix in dextrose) 2,000 mg 50 mL 2,000 mg Intravenous New Bag     07/17/2024 0620 cefepime (MAXIPIME) IVPB (premix in dextrose) 2,000 mg 50 mL 2,000 mg Intravenous New Bag     07/16/2024 2056 vancomycin (VANCOCIN) IVPB (premix in dextrose) 1,000 mg 200 mL 1,000 mg Intravenous New Bag     07/17/2024 0713 vancomycin (VANCOCIN) IVPB (premix in dextrose) 750 mg 150 mL 750 mg Intravenous New Bag     07/16/2024 2051 pregabalin (LYRICA) capsule 200 mg 200 mg Oral Given     07/16/2024 2206 rivaroxaban (XARELTO) tablet 2.5 mg 2.5 mg Oral Given     07/16/2024 2158 sodium chloride 0.9 % infusion 125 mL/hr Intravenous New Bag     07/16/2024 2051 nicotine (NICODERM CQ) 21 mg/24 hr TD 24 hr patch 1 patch 1 patch Transdermal Medication Applied            Past Medical History:   Diagnosis Date    Anxiety     Closed fracture of coccyx ([facility]) 05/24/2023    COPD (chronic obstructive pulmonary disease) ([facility])     Diabetes ([facility])     Diabetes mellitus ([facility])     GERD (gastroesophageal reflux disease)     Hyperlipidemia     Hypertension     IBS (irritable bowel syndrome)     Peripheral arterial disease ([facility])     Shoulder fracture     Tobacco use      Present on Admission:   Acute respiratory failure with hypoxia ([facility])   Anemia   Anxiety and depression   COPD, severity to be determined ([facility])   Essential hypertension   GERD without esophagitis   Tobacco use disorder   Type 2 diabetes mellitus with diabetic polyneuropathy ([facility])   Sepsis due to pneumonia ([facility])   Open wound of right foot   Hyperkalemia   Acute metabolic  encephalopathy      Admitting Diagnosis: Fever [R50.9]  Age/Sex: 78 y.o. female  Admission Orders:  Scheduled Medications:  AMILoride, 5 mg, Oral, Daily  ammonium lactate, , Topical, Daily  aspirin, 81 mg, Oral, Daily  atorvastatin, 10 mg, Oral, Daily With Dinner  buPROPion, 300 mg, Oral, Daily  cefepime, 2,000 mg, Intravenous, Q12H  cyanocobalamin, 1,000 mcg, Oral, Daily  Fluticasone Furoate-Vilanterol, 1 puff, Inhalation, Daily  insulin lispro, 1-6 Units, Subcutaneous, TID AC  insulin lispro, 1-6 Units, Subcutaneous, HS  lisinopril, 5 mg, Oral, Daily  mupirocin, , Topical, Daily  nicotine, 1 patch, Transdermal, Daily  oxybutynin, 5 mg, Oral, Daily  pantoprazole, 40 mg, Oral, Daily  polyethylene glycol, 17 g, Oral, Daily  pregabalin, 200 mg, Oral, TID  rivaroxaban, 2.5 mg, Oral, BID  vancomycin, 12.5 mg/kg (Adjusted), Intravenous, Q12H      Continuous IV Infusions:  sodium chloride, 125 mL/hr, Intravenous, Continuous      PRN Meds:  acetaminophen, 650 mg, Oral, Q6H PRN  albuterol, 2 puff, Inhalation, Q6H PRN  dextromethorphan-guaiFENesin, 10 mL, Oral, Q4H PRN  ondansetron, 4 mg, Intravenous, Q6H PRN        IP CONSULT TO PHARMACY  IP CONSULT TO PODIATRY    Network Utilization Review Department  ATTENTION: Please call with any questions or concerns to [phone] and carefully listen to the prompts so that you are directed to the right person. All voicemails are confidential.   For Discharge needs, contact Care Management DC Support Team at [phone] opt. [phone]  Send all requests for admission clinical reviews, approved or denied determinations and any other requests to dedicated fax number below belonging to the campus where the patient is receiving treatment. List of dedicated fax numbers for the Facilities:  FACILITY NAME UR FAX NUMBER   ADMISSION DENIALS (Administrative/Medical Necessity) [phone]   DISCHARGE SUPPORT TEAM (NETWORK) [phone]   PARENT CHILD HEALTH (Maternity/NICU/Pediatrics) [phone]    [facility] [phone]   [facility] [phone]   [facility] [phone]   [facility] [phone]   [facility] [phone]   [facility] [phone]   [facility] [phone]   [facility] [phone]   [facility] [phone]   [facility] [phone]   [facility] [phone]   [facility] ([place], [place]) [phone]

## 2024-07-17 NOTE — H&P
[facility]  H&P  Name: Lona Cedeno 78 y.o. female I MRN: [id number]  Unit/Bed#: ED 01 I Date of Admission: 7/16/2024   Date of Service: 7/17/2024 I Hospital Day: 1      Assessment & Plan   * Sepsis due to pneumonia (HCC)  Assessment & Plan  Admit to medicine  Trend lactic acid and procalcitonin  Hydrate with normal saline 125 cc/h  Blood cultures are currently pending  We will give cefepime 2 g IV every 12 hours and vancomycin 750 mg IV every 12 hours with pharmacy to dose  Will obtain urine for strep pneumo and Legionella  Patient met sepsis criteria in that she was febrile with a Tmax of 100.6, tachycardic with a heart rate as high as 102, tachypneic with a respiratory rate as high as 30, leukocytosis of 19.83 and a known source of infection being right lower lobe pneumonia    Acute respiratory failure with hypoxia (HCC)  Assessment & Plan  Patient is requiring 3 L nasal cannula maintain O2 saturation of 90%  Likely secondary to pneumonia being treated as per above  Will place on O2 and respiratory protocol  Continue prehospital respiratory medications    Open wound of right foot  Assessment & Plan  Patient has been following as an outpatient with wound care  States that it previously closed but opened started draining 3 days ago  We will give antibiotics as per above  Consult podiatry    Acute metabolic encephalopathy  Assessment & Plan  Likely secondary to sepsis being treated as per above  We will continue to monitor with neurochecks every shift    Hyperkalemia  Assessment & Plan  Mild at 5.4  Will hold prehospital supplemental potassium chloride  Will continue prehospital amiloride at this time  Continue to monitor with repeat labs in a.m.    Anemia  Assessment & Plan  Hemoglobin is better than baseline  Continue prehospital vitamin B12 1000 mcg p.o. daily  Continue to monitor with repeat labs in a.m.    Tobacco use disorder  Assessment & Plan  Will give nicotine 21 mg transdermal  daily    COPD, severity to be determined (HCC)  Assessment & Plan  Continue prehospital Proventil 90 mcg 2 puffs every 6 hours as needed substitute Breo 100/25 mcg 5 daily for prehospital Advair    GERD without esophagitis  Assessment & Plan  Continue prehospital Protonix 40 mg p.o. daily    Anxiety and depression  Assessment & Plan  Continue prehospital Wellbutrin  mg p.o. daily    Essential hypertension  Assessment & Plan  Continue prehospital amlodipine 5 mg p.o. daily and lisinopril 5 mg p.o. daily    Type 2 diabetes mellitus with diabetic polyneuropathy (HCC)  Assessment & Plan  Lab Results   Component Value Date    HGBA1C 7.6 (H) 06/08/2024       Recent Labs     07/16/24  2226   POCGLU 144*       Blood Sugar Average: Last 72 hrs:  (P) 144    Placed on [facility] type II diet  Hold prehospital oral antihyperglycemic's  Obtain Accu-Cheks before meals and at bedtime with Humalog correction dose before meals and at bedtime         VTE Prophylaxis: Rivaroxaban (Xarelto)  Code Status: Level 1  Discussion with family: None present at bedside at time of exam    Anticipated Length of Stay:  Patient will be admitted on an Inpatient basis with an anticipated length of stay of  > 2 midnights.   Justification for Hospital Stay: Sepsis secondary to right lower lobe pneumonia requiring IV antibiotics and further monitoring    Total Time for Visit, including Counseling / Coordination of Care: 1 hour.  Greater than 50% of this total time spent on direct patient counseling and coordination of care.    Chief Complaint:   Fever and shortness of breath x 3 days    History of Present Illness:    Lona Cedeno is a 78 y.o. female who presents with fever and shortness of breath x 3 days.  Patient with a rather complicated past medical history presents ER for further evaluation and treatment of a 3-day history of shaking chills at home fever reportedly as high as 103.7 prehospital.  Time of exam patient is comfortable resting in bed  she is able to answer questions appropriately though there was some concern voiced by the daughter who was present in ER that she was somewhat confused over the past several days.  Patient was seen at the PCP yesterday and was noted to have an O2 saturation 87% on room air.    Patient has a history of COPD and continues to smoke a pack cigarettes per day.  Patient additionally has a history of a chronic right ankle wound which she was recently hospitalized for 2 months she was being treated for osteomyelitis.  Patient does follow with wound care and states that completely closed up until 3 days ago when it opened up and began draining again.  Patient does complain of a cough which is productive of yellowish sputum but states that this is chronic for her.    While in ER patient was noted to meet sepsis criteria and sepsis orders were initiated patient was referred for admission.  Chest x-ray revealed right lower lobe pneumonia.    Review of Systems:    Review of Systems   Constitutional:  Positive for chills and fever.   HENT:  Negative for congestion and sore throat.    Respiratory:  Positive for cough and shortness of breath. Negative for wheezing.    Cardiovascular:  Negative for chest pain and palpitations.   Gastrointestinal:  Negative for abdominal pain, diarrhea, nausea and vomiting.   Genitourinary:  Negative for dysuria, frequency, hematuria and urgency.   Musculoskeletal:  Negative for arthralgias and myalgias.   Skin:  Positive for wound.   Neurological:  Negative for dizziness, syncope, light-headedness and headaches.   All other systems reviewed and are negative.      Past Medical and Surgical History:     Past Medical History:   Diagnosis Date    Anxiety     Closed fracture of coccyx (HCC) 05/24/2023    COPD (chronic obstructive pulmonary disease) (HCC)     Diabetes (HCC)     Diabetes mellitus (HCC)     GERD (gastroesophageal reflux disease)     Hyperlipidemia     Hypertension     IBS (irritable bowel  syndrome)     Peripheral arterial disease (HCC)     Shoulder fracture     Tobacco use        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;  Surgeon: Cristian Child MD;  Location: [facility];  Service: Urology    EVACUATION OF HEMATOMA Right 2024    Procedure: EVACUATION/ DRAINAGE CHEST WALL  HEMATOMA;  Surgeon: Seth Hoyos MD;  [facility];  Service: Vascular    FL RETROGRADE PYELOGRAM  2023    HERNIA REPAIR      umbilicus w/ mesh    PA BYPASS W/VEIN AXILLARY-FEMORAL Right 2024    Procedure: BYPASS AXILLO-FEMORAL, RIGHT WITH 8MM RINGED PTFE GRAFT;  Surgeon: Seth Hoyos MD;  [facility];  Service: Vascular    SPLIT THICKNESS SKIN GRAFT Right 2024    Procedure: SKIN GRAFT SPLIT THICKNESS (STSG)  EXTREMITY, Wound vac dressing change;  Surgeon: Rigo Yeboah DPM;  [facility];  Service: Podiatry    WOUND DEBRIDEMENT Right 2024    Procedure: RIGHT WOUND AND ACHILLES DEBRIDEMENT FOOT/TOE (WASH OUT); PARTIAL AMPUTATION DISTAL 2ND TOE;  Surgeon: Aaron Montero DPM;  [facility];  Service: Podiatry    WOUND DEBRIDEMENT Right 2024    Procedure: DEBRIDEMENT RIGHT GROIN  WOUND AND WASHOUT, APPLICATION OF WOUND VAC;  Surgeon: Suly Muro DO;  [facility];  Service: Vascular    WOUND DEBRIDEMENT Right 2024    Procedure: DEBRIDEMENT LOWER EXTREMITY, washout;  Surgeon: Suly Muro DO;  [facility];  Service: Vascular        Meds/Allergies:    Prior to Admission medications    Medication Sig Start Date End Date Taking? Authorizing Provider   acetaminophen (TYLENOL) 325 mg tablet Take 3 tablets (975 mg total) by mouth every 8 (eight) hours  Patient not taking: Reported on 7/16/2024 2/23/24   Clara Gann, DO   acetaminophen-codeine (TYLENOL with CODEINE #3) 300-30 MG per tablet Take 1 tablet by mouth every 4 (four) hours as needed for moderate pain  Patient not taking: Reported on 5/17/2024 5/14/24   Vivek Preston DO   albuterol (Ventolin HFA) 90 mcg/act inhaler Inhale 2 puffs every 6 (six) hours as needed for wheezing 6/24/24   Vivek Preston DO   AMILoride 5 mg tablet  6/4/24   Historical Provider, MD   ammonium lactate (LAC-HYDRIN) 12 % lotion Apply topically daily To dry scaling skin 6/18/24 7/18/24  Lindsey Owen PA-C   Apoaequorin 20 MG CAPS Take 20 mg by mouth in the morning 4/18/24   Vivek Preston DO   Aspirin (ASPIR-81 PO) take one by mouth daily 1/14/14   Historical Provider, MD   atorvastatin (LIPITOR) 10 mg tablet Take 1 tablet (10 mg total) by mouth daily with dinner 6/24/24   Vivek Preston DO   Blood Glucose Monitoring Suppl (OneTouch Verio) w/Device KIT Use 2 (two) times a day 4/18/24   Vivek Preston DO   buPROPion (WELLBUTRIN XL) 300 mg 24 hr tablet Take 1 tablet (300 mg total) by mouth daily 6/24/24   Vivek Preston DO   clonazePAM (KlonoPIN) 1 mg tablet Take 1 tablet (1 mg total) by mouth every evening 6/24/24   Vivek Preston DO   cyanocobalamin (VITAMIN B-12) 1000 MCG tablet Take 1 tablet (1,000 mcg total) by mouth daily 6/24/24   Vivek Preston DO   Fluticasone-Salmeterol (Advair Diskus) 250-50 mcg/dose inhaler Inhale 1 puff 2 (two) times a day Rinse mouth after use. 6/24/24 9/22/24  Vivek Preston DO   Lancets (OneTouch Delica Plus [id number]) MISC TEST TWO TIMES A DAY 4/18/24   Vivek Preston DO   levofloxacin (LEVAQUIN) 500 mg tablet  12/22/23   Historical Provider,  MD   lisinopril (ZESTRIL) 5 mg tablet Take 1 tablet (5 mg total) by mouth daily 6/24/24   Vivek Preston DO   metFORMIN (GLUCOPHAGE) 1000 MG tablet Take 1 tablet (1,000 mg total) by mouth 2 (two) times a day with meals 6/24/24   Vivek Preston DO   Misc. Devices (Carex Coccyx Cushion) MISC Use in the morning 5/24/23   ROXANE Garcia   mupirocin (BACTROBAN) 2 % ointment Apply topically daily for 7 days To open wounds of RLE 6/25/24 7/16/24  Lindsey Owen PA-C   naloxone (NARCAN) 4 mg/0.1 mL nasal spray Administer 1 spray into a nostril. If no response after 2-3 minutes, give another dose in the other nostril using a new spray. 4/18/24 4/18/25  Vivek Preston DO   nicotine (NICODERM CQ) 14 mg/24hr TD 24 hr patch Place 1 patch on the skin over 24 hours daily  Patient not taking: Reported on 6/20/2024 3/14/24   ROXANE Bear   Omega-3 Fatty Acids (FISH OIL PO) 1 cap oral daily  Patient not taking: Reported on 6/20/2024 3/24/14   Historical Provider, MD Griffinuch Verio test strip  4/24/24   Historical Provider, MD   pantoprazole (PROTONIX) 40 mg tablet Take 1 tablet (40 mg total) by mouth daily 6/24/24   Vivek Preston DO   polyethylene glycol (MIRALAX) 17 g packet Take 17 g by mouth daily 2/24/24   Clara Gann DO   potassium chloride (Klor-Con M10) 10 mEq tablet  6/4/24   Historical Provider, MD   pregabalin (LYRICA) 200 MG capsule TAKE ONE CAPSULE BY MOUTH THREE TIMES A DAY 6/24/24   Vivek Preston DO   rivaroxaban (Xarelto) 2.5 mg tablet Take 1 tablet (2.5 mg total) by mouth 2 (two) times a day 6/24/24   Vivek Preston DO   solifenacin (VESICARE) 5 mg tablet Take 1 tablet (5 mg total) by mouth daily 6/24/24   Vivek Munshower, DO     all medications and allergies reviewed    Allergies:   Allergies   Allergen Reactions    Paroxetine Other (See Comments)     Became suicidal    Adhesive [Medical Tape] Itching and Blisters    Carafate [Sucralfate] Other (See Comments)     Mouth  "sores, tongue sore    Sulfa Antibiotics Hives and Rash    Sulfamethoxazole-Trimethoprim Hives       Social History:     Marital Status: /Civil Union   Occupation: Retired   Patient Pre-hospital Living Situation: Resides at home  Patient Pre-hospital Level of Mobility: Full   Patient Pre-hospital Diet Restrictions: Diabetic    Social History     Substance and Sexual Activity   Alcohol Use Not Currently     Social History     Tobacco Use   Smoking Status Every Day    Current packs/day: 0.25    Average packs/day: 1 pack/day for 63.5 years (63.1 ttl pk-yrs)    Types: Cigarettes    Start date: 1961   Smokeless Tobacco Never   Tobacco Comments    Quit January 31, 2024     Social History     Substance and Sexual Activity   Drug Use Never       Family History:  I have reviewed the patient's family history    Physical Exam:     Vitals:   Blood Pressure: 113/56 (07/16/24 2230)  Pulse: 84 (07/16/24 2230)  Temperature: (!) 100.6 °F (38.1 °C) (07/16/24 1753)  Temp Source: Oral (07/16/24 1753)  Respirations: 22 (07/16/24 1830)  Height: 5' 1\" (154.9 cm) (07/16/24 1753)  Weight - Scale: 76.2 kg (168 lb) (07/16/24 1753)  SpO2: 90 % (07/16/24 2230)    Physical Exam  Vitals and nursing note reviewed.   Constitutional:       Appearance: She is well-developed. She is ill-appearing.   HENT:      Head: Normocephalic and atraumatic.      Mouth/Throat:      Pharynx: No oropharyngeal exudate.   Eyes:      General: No scleral icterus.     Pupils: Pupils are equal, round, and reactive to light.   Neck:      Vascular: No JVD.   Cardiovascular:      Rate and Rhythm: Normal rate and regular rhythm.      Heart sounds: Normal heart sounds. No murmur heard.  Pulmonary:      Effort: Pulmonary effort is normal. No respiratory distress.      Breath sounds: Rhonchi present. No wheezing or rales.   Abdominal:      General: Bowel sounds are normal.      Palpations: Abdomen is soft.      Tenderness: There is no abdominal tenderness. There " is no guarding or rebound.   Musculoskeletal:         General: Normal range of motion.      Cervical back: Normal range of motion and neck supple.   Lymphadenopathy:      Cervical: No cervical adenopathy.   Skin:     General: Skin is warm and dry.      Findings: Lesion present. No erythema or rash.      Comments: Chronic right lower extremity wound dressing clean and dry   Neurological:      Mental Status: She is alert and oriented to person, place, and time.   Psychiatric:         Behavior: Behavior normal.         Additional Data:     Lab Results: I have personally reviewed pertinent reports.      Results from last 7 days   Lab Units 07/16/24  1757   WBC Thousand/uL 19.83*   HEMOGLOBIN g/dL 14.4   HEMATOCRIT % 45.8   PLATELETS Thousands/uL 277   SEGS PCT % 89*   LYMPHO PCT % 4*   MONO PCT % 6   EOS PCT % 0     Results from last 7 days   Lab Units 07/16/24  1757   SODIUM mmol/L 134*   POTASSIUM mmol/L 5.4*   CHLORIDE mmol/L 101   CO2 mmol/L 25   BUN mg/dL 17   CREATININE mg/dL 0.67   ANION GAP mmol/L 8   CALCIUM mg/dL 10.4*   ALBUMIN g/dL 4.4   TOTAL BILIRUBIN mg/dL 0.37   ALK PHOS U/L 68   ALT U/L 20   AST U/L 43*   GLUCOSE RANDOM mg/dL 119     Results from last 7 days   Lab Units 07/16/24  1757   INR  1.17     Results from last 7 days   Lab Units 07/16/24  2226   POC GLUCOSE mg/dl 144*         Results from last 7 days   Lab Units 07/16/24  1757   LACTIC ACID mmol/L 2.0   PROCALCITONIN ng/ml 0.76*       Imaging: I have personally reviewed pertinent reports.    XR chest 1 view portable   Final Result by Joanne Flores MD (07/16 2139)      Mild opacity in the right base, question atelectasis versus pneumonia.      Chronic increase in interstitial lung markings.            Workstation performed: [id number]         XR ankle 3+ views RIGHT    (Results Pending)         EKG, Pathology, and Other Studies Reviewed on Admission:   EKG: N/A    Epic / Care Everywhere Records Reviewed: Yes    ** Please Note: This note  has been constructed using a voice recognition system. **

## 2024-07-17 NOTE — CONSULTS
[facility] Podiatry - Consultation    Patient Information:   Lona Cedeno 78 y.o. female MRN: [id number]  Unit/Bed#: ED 01 Encounter: [id number]  PCP: Vivek Preston DO  Date of Admission:  2024  Date of Consultation: 24  Requesting Physician: Claudine Pretty MD      ASSESSMENT:    Lona Cedeno is a 78 y.o. female with:     ulceration of the right Achilles limited breakdown of skin  History of vascular intervention right lower extremity  Partial toe amputation right lower extremity  Type 2 diabetes with neuropathy      PLAN:   Arterial studies reviewed and do show healing potential to the right foot  - Advised minimal pressure on the right posterior ankle, no signs of infection, stable wound  - Follow-up in wound care upon discharge  - Orders written for wound care  Podiatry sign off for now      SUBJECTIVE    History of Present Illness:    Lona Cedeno is a 78 y.o. female with past medical history of ulceration of the right heel who presents with ulceration and pneumonia.     Review of Systems:    Constitutional: Negative.    HENT: Negative.    Eyes: Negative.    Respiratory: Negative.    Cardiovascular: Negative.    Gastrointestinal: Negative.    Musculoskeletal: neg   Skin:as above   Neurological: neg   Psych: Negative.     Past Medical and Surgical History:     Past Medical History:   Diagnosis Date    Anxiety     Closed fracture of coccyx (HCC) 2023    COPD (chronic obstructive pulmonary disease) (HCC)     Diabetes (HCC)     Diabetes mellitus (HCC)     GERD (gastroesophageal reflux disease)     Hyperlipidemia     Hypertension     IBS (irritable bowel syndrome)     Peripheral arterial disease (HCC)     Shoulder fracture     Tobacco use        Past Surgical History:   Procedure Laterality Date    ANKLE FRACTURE SURGERY Right     has screw in place     SECTION      x2    CHOLECYSTECTOMY      CYSTOSCOPY W/ LASER LITHOTRIPSY Left 2023    Procedure: CYSTOSCOPY; RETROGRADE;  URETEROSCOPY; BIOPSY; LEFT -INSERTION OF STENT;  Surgeon: Cristian Child MD;  [facility];  Service: Urology    CYSTOSCOPY W/ LASER LITHOTRIPSY Left 11/20/2023    Procedure: CYSTOSCOPY; RETROGRADE; URETEROSCOPY; 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;  Surgeon: Cristian Child MD;  [facility];  Service: Urology    EVACUATION OF HEMATOMA Right 2/4/2024    Procedure: EVACUATION/ DRAINAGE CHEST WALL  HEMATOMA;  Surgeon: Seth Hoyos MD;  [facility];  Service: Vascular    FL RETROGRADE PYELOGRAM  9/18/2023    HERNIA REPAIR      umbilicus w/ mesh    NM BYPASS W/VEIN AXILLARY-FEMORAL Right 1/31/2024    Procedure: BYPASS AXILLO-FEMORAL, RIGHT WITH 8MM RINGED PTFE GRAFT;  Surgeon: Seth Hoyos MD;  [facility];  Service: Vascular    SPLIT THICKNESS SKIN GRAFT Right 2/28/2024    Procedure: SKIN GRAFT SPLIT THICKNESS (STSG)  EXTREMITY, Wound vac dressing change;  Surgeon: Rigo Yeboah DPM;  [facility];  Service: Podiatry    WOUND DEBRIDEMENT Right 2/4/2024    Procedure: RIGHT WOUND AND ACHILLES DEBRIDEMENT FOOT/TOE (WASH OUT); PARTIAL AMPUTATION DISTAL 2ND TOE;  Surgeon: Aaron Montero DPM;  [facility];  Service: Podiatry    WOUND DEBRIDEMENT Right 2/14/2024    Procedure: DEBRIDEMENT RIGHT GROIN  WOUND AND WASHOUT, APPLICATION OF WOUND VAC;  Surgeon: Suly Muro DO;  [facility];  Service: Vascular    WOUND DEBRIDEMENT Right 2/19/2024    Procedure: DEBRIDEMENT LOWER EXTREMITY, washout;  Surgeon: Suly Muro DO;  [facility];  Service: Vascular       Meds/Allergies:    Not in a hospital admission.    Allergies   Allergen Reactions    Paroxetine Other (See Comments)     Became suicidal    Adhesive [Medical Tape] Itching and Blisters    Carafate [Sucralfate] Other (See Comments)     Mouth sores, tongue sore    Sulfa Antibiotics Hives and Rash    Sulfamethoxazole-Trimethoprim Hives  "      Social History:     Marital Status: /Civil Union    Substance Use History:   Social History     Substance and Sexual Activity   Alcohol Use Not Currently     Social History     Tobacco Use   Smoking Status Every Day    Current packs/day: 0.25    Average packs/day: 1 pack/day for 63.5 years (63.1 ttl pk-yrs)    Types: Cigarettes    Start date: 1961   Smokeless Tobacco Never   Tobacco Comments    Quit January 31, 2024     Social History     Substance and Sexual Activity   Drug Use Never       Family History:    Family History   Problem Relation Age of Onset    COPD Mother     Emphysema Mother     COPD Father     Emphysema Father          OBJECTIVE:    Vitals:   Blood Pressure: 122/74 (07/17/24 0700)  Pulse: 94 (07/17/24 0700)  Temperature: 98.5 °F (36.9 °C) (07/17/24 0700)  Temp Source: Temporal (07/17/24 0700)  Respirations: 18 (07/17/24 0700)  Height: 5' 1\" (154.9 cm) (07/16/24 1753)  Weight - Scale: 76.2 kg (168 lb) (07/16/24 1753)  SpO2: 94 % (07/17/24 0700)    Physical Exam:     General Appearance: Alert, cooperative, no distress.  HEENT: Head normocephalic, atraumatic, without obvious abnormality.    Abdomen: Without distension.  Psychiatric: AAOx3  Lower Extremity:  Vascular:   Ulceration on the right skin right posterior Achilles, dry scab in place, no signs of cellulitis, minimal drainage  Additional Data:     Lab Results: I have personally reviewed pertinent labs including:    Results from last 7 days   Lab Units 07/17/24 0622 07/16/24 1757   WBC Thousand/uL 17.73* 19.83*   HEMOGLOBIN g/dL 12.6 14.4   HEMATOCRIT % 40.4 45.8   PLATELETS Thousands/uL 209 277   SEGS PCT %  --  89*   LYMPHO PCT % 5* 4*   MONO PCT % 2* 6   EOS PCT % 1 0     Results from last 7 days   Lab Units 07/17/24 0622 07/16/24  1757   POTASSIUM mmol/L 5.3 5.4*   CHLORIDE mmol/L 99 101   CO2 mmol/L 34* 25   BUN mg/dL 10 17   CREATININE mg/dL 0.59* 0.67   CALCIUM mg/dL 9.5 10.4*   ALK PHOS U/L  --  68   ALT U/L  --  20   AST " "U/L  --  43*     Results from last 7 days   Lab Units 07/16/24  1757   INR  1.17       Cultures: I have personally reviewed pertinent cultures including:    Results from last 7 days   Lab Units 07/16/24  2236 07/16/24  1757   BLOOD CULTURE   --  Received in Microbiology Lab. Culture in Progress.  Received in Microbiology Lab. Culture in Progress.   LEGIONELLA URINARY ANTIGEN  Negative  --            Imaging: I have personally reviewed pertinent films in PACS.  EKG, Pathology, and Other Studies: I have personally reviewed pertinent reports.        ** Please Note: Portions of the record may have been created with voice recognition software. Occasional wrong word or \"sound a like\" substitutions may have occurred due to the inherent limitations of voice recognition software. Read the chart carefully and recognize, using context, where substitutions have occurred. **    "

## 2024-07-17 NOTE — ASSESSMENT & PLAN NOTE
Hemoglobin is better than baseline  Continue prehospital vitamin B12 1000 mcg p.o. daily  Continue to monitor with repeat labs in a.m.

## 2024-07-17 NOTE — ASSESSMENT & PLAN NOTE
Mild at 5.4  Will hold prehospital supplemental potassium chloride  Will continue prehospital amiloride at this time  Continue to monitor with repeat labs in a.m.

## 2024-07-17 NOTE — ASSESSMENT & PLAN NOTE
Continue prehospital Proventil 90 mcg 2 puffs every 6 hours as needed substitute Breo 100/25 mcg 5 daily for prehospital Advair

## 2024-07-17 NOTE — ASSESSMENT & PLAN NOTE
Patient has been following as an outpatient with wound care  States that it previously closed but opened started draining 3 days ago  We will give antibiotics as per above  Consult podiatry

## 2024-07-17 NOTE — PROGRESS NOTES
Lona Cedeno is a 78 y.o. female who is currently ordered Vancomycin IV with management by the Pharmacy Consult service.  Relevant clinical data and objective / subjective history reviewed.  Vancomycin Assessment:  Indication and Goal AUC/Trough: Soft tissue (goal -600, trough >10); Pneumonia (goal -600, trough >10), -600, trough >10  Clinical Status: stable  Micro:   pending  Renal Function:  SCr: 0.59 mg/dL  CrCl: 73.4 mL/min  Renal replacement: Not on dialysis  Days of Therapy: 2  Current Dose: 750mg IV q12h  Vancomycin Plan:  New Dosinmg IV q12h  Estimated AUC: 424 mcg*hr/mL  Estimated Trough: 12.3 mcg/mL  Next Level: 7/18 AM Labs  Renal Function Monitoring: Daily BMP and UOP  Pharmacy will continue to follow closely for s/sx of nephrotoxicity, infusion reactions and appropriateness of therapy.  BMP and CBC will be ordered per protocol. We will continue to follow the patient’s culture results and clinical progress daily.    Gibson Liriano, Pharmacist

## 2024-07-18 LAB
ALBUMIN SERPL BCG-MCNC: 3.6 G/DL (ref 3.5–5)
ALP SERPL-CCNC: 53 U/L (ref 34–104)
ALT SERPL W P-5'-P-CCNC: 9 U/L (ref 7–52)
ANION GAP SERPL CALCULATED.3IONS-SCNC: 5 MMOL/L (ref 4–13)
AST SERPL W P-5'-P-CCNC: 14 U/L (ref 13–39)
BASOPHILS # BLD AUTO: 0.04 THOUSANDS/ÂΜL (ref 0–0.1)
BASOPHILS NFR BLD AUTO: 0 % (ref 0–1)
BILIRUB SERPL-MCNC: 0.31 MG/DL (ref 0.2–1)
BUN SERPL-MCNC: 10 MG/DL (ref 5–25)
CALCIUM SERPL-MCNC: 9.3 MG/DL (ref 8.4–10.2)
CHLORIDE SERPL-SCNC: 101 MMOL/L (ref 96–108)
CO2 SERPL-SCNC: 28 MMOL/L (ref 21–32)
CREAT SERPL-MCNC: 0.55 MG/DL (ref 0.6–1.3)
EOSINOPHIL # BLD AUTO: 0.46 THOUSAND/ÂΜL (ref 0–0.61)
EOSINOPHIL NFR BLD AUTO: 4 % (ref 0–6)
ERYTHROCYTE [DISTWIDTH] IN BLOOD BY AUTOMATED COUNT: 15.6 % (ref 11.6–15.1)
GFR SERPL CREATININE-BSD FRML MDRD: 90 ML/MIN/1.73SQ M
GLUCOSE SERPL-MCNC: 108 MG/DL (ref 65–140)
GLUCOSE SERPL-MCNC: 141 MG/DL (ref 65–140)
GLUCOSE SERPL-MCNC: 147 MG/DL (ref 65–140)
GLUCOSE SERPL-MCNC: 176 MG/DL (ref 65–140)
GLUCOSE SERPL-MCNC: 232 MG/DL (ref 65–140)
HCT VFR BLD AUTO: 38.2 % (ref 34.8–46.1)
HGB BLD-MCNC: 11.6 G/DL (ref 11.5–15.4)
IMM GRANULOCYTES # BLD AUTO: 0.04 THOUSAND/UL (ref 0–0.2)
IMM GRANULOCYTES NFR BLD AUTO: 0 % (ref 0–2)
LYMPHOCYTES # BLD AUTO: 0.66 THOUSANDS/ÂΜL (ref 0.6–4.47)
LYMPHOCYTES NFR BLD AUTO: 6 % (ref 14–44)
MCH RBC QN AUTO: 28.4 PG (ref 26.8–34.3)
MCHC RBC AUTO-ENTMCNC: 30.4 G/DL (ref 31.4–37.4)
MCV RBC AUTO: 94 FL (ref 82–98)
MONOCYTES # BLD AUTO: 0.73 THOUSAND/ÂΜL (ref 0.17–1.22)
MONOCYTES NFR BLD AUTO: 6 % (ref 4–12)
NEUTROPHILS # BLD AUTO: 9.76 THOUSANDS/ÂΜL (ref 1.85–7.62)
NEUTS SEG NFR BLD AUTO: 84 % (ref 43–75)
NRBC BLD AUTO-RTO: 0 /100 WBCS
PLATELET # BLD AUTO: 190 THOUSANDS/UL (ref 149–390)
PMV BLD AUTO: 9.7 FL (ref 8.9–12.7)
POTASSIUM SERPL-SCNC: 3.8 MMOL/L (ref 3.5–5.3)
PROT SERPL-MCNC: 6.1 G/DL (ref 6.4–8.4)
RBC # BLD AUTO: 4.08 MILLION/UL (ref 3.81–5.12)
SODIUM SERPL-SCNC: 134 MMOL/L (ref 135–147)
VANCOMYCIN SERPL-MCNC: 7.6 UG/ML (ref 10–20)
WBC # BLD AUTO: 11.69 THOUSAND/UL (ref 4.31–10.16)

## 2024-07-18 PROCEDURE — 80053 COMPREHEN METABOLIC PANEL: CPT | Performed by: HOSPITALIST

## 2024-07-18 PROCEDURE — 85025 COMPLETE CBC W/AUTO DIFF WBC: CPT | Performed by: HOSPITALIST

## 2024-07-18 PROCEDURE — 99232 SBSQ HOSP IP/OBS MODERATE 35: CPT | Performed by: HOSPITALIST

## 2024-07-18 PROCEDURE — 80202 ASSAY OF VANCOMYCIN: CPT | Performed by: PHYSICIAN ASSISTANT

## 2024-07-18 PROCEDURE — 82948 REAGENT STRIP/BLOOD GLUCOSE: CPT

## 2024-07-18 RX ORDER — CEFUROXIME AXETIL 250 MG/1
500 TABLET ORAL EVERY 12 HOURS SCHEDULED
Status: DISCONTINUED | OUTPATIENT
Start: 2024-07-18 | End: 2024-07-19 | Stop reason: HOSPADM

## 2024-07-18 RX ORDER — VANCOMYCIN HYDROCHLORIDE 1 G/200ML
15 INJECTION, SOLUTION INTRAVENOUS EVERY 12 HOURS
Status: DISCONTINUED | OUTPATIENT
Start: 2024-07-18 | End: 2024-07-18

## 2024-07-18 RX ADMIN — ACETAMINOPHEN 650 MG: 325 TABLET ORAL at 00:23

## 2024-07-18 RX ADMIN — Medication: at 08:54

## 2024-07-18 RX ADMIN — ASPIRIN 81 MG: 81 TABLET, COATED ORAL at 08:53

## 2024-07-18 RX ADMIN — AMILORIDE HYDROCLORIDE 5 MG: 5 TABLET ORAL at 08:53

## 2024-07-18 RX ADMIN — ALBUTEROL SULFATE 2 PUFF: 90 AEROSOL, METERED RESPIRATORY (INHALATION) at 08:55

## 2024-07-18 RX ADMIN — FLUTICASONE FUROATE AND VILANTEROL TRIFENATATE 1 PUFF: 100; 25 POWDER RESPIRATORY (INHALATION) at 08:54

## 2024-07-18 RX ADMIN — RIVAROXABAN 2.5 MG: 2.5 TABLET, FILM COATED ORAL at 18:04

## 2024-07-18 RX ADMIN — SODIUM CHLORIDE 125 ML/HR: 0.9 INJECTION, SOLUTION INTRAVENOUS at 03:12

## 2024-07-18 RX ADMIN — POLYETHYLENE GLYCOL 3350 17 G: 17 POWDER, FOR SOLUTION ORAL at 08:54

## 2024-07-18 RX ADMIN — PREGABALIN 200 MG: 100 CAPSULE ORAL at 17:19

## 2024-07-18 RX ADMIN — CYANOCOBALAMIN TAB 500 MCG 1000 MCG: 500 TAB at 08:53

## 2024-07-18 RX ADMIN — CEFUROXIME AXETIL 500 MG: 250 TABLET, FILM COATED ORAL at 18:02

## 2024-07-18 RX ADMIN — NICOTINE 1 PATCH: 21 PATCH, EXTENDED RELEASE TRANSDERMAL at 08:55

## 2024-07-18 RX ADMIN — GUAIFENESIN AND DEXTROMETHORPHAN 10 ML: 100; 10 SYRUP ORAL at 18:08

## 2024-07-18 RX ADMIN — INSULIN LISPRO 2 UNITS: 100 INJECTION, SOLUTION INTRAVENOUS; SUBCUTANEOUS at 21:16

## 2024-07-18 RX ADMIN — ATORVASTATIN CALCIUM 10 MG: 10 TABLET, FILM COATED ORAL at 17:19

## 2024-07-18 RX ADMIN — LISINOPRIL 5 MG: 5 TABLET ORAL at 08:53

## 2024-07-18 RX ADMIN — PREGABALIN 200 MG: 100 CAPSULE ORAL at 21:13

## 2024-07-18 RX ADMIN — CEFEPIME HYDROCHLORIDE 2000 MG: 2 INJECTION, SOLUTION INTRAVENOUS at 05:29

## 2024-07-18 RX ADMIN — OXYBUTYNIN 5 MG: 5 TABLET, FILM COATED, EXTENDED RELEASE ORAL at 08:53

## 2024-07-18 RX ADMIN — RIVAROXABAN 2.5 MG: 2.5 TABLET, FILM COATED ORAL at 08:55

## 2024-07-18 RX ADMIN — INSULIN LISPRO 1 UNITS: 100 INJECTION, SOLUTION INTRAVENOUS; SUBCUTANEOUS at 12:08

## 2024-07-18 RX ADMIN — MUPIROCIN 1 APPLICATION: 20 OINTMENT TOPICAL at 08:54

## 2024-07-18 RX ADMIN — GUAIFENESIN AND DEXTROMETHORPHAN 10 ML: 100; 10 SYRUP ORAL at 00:23

## 2024-07-18 RX ADMIN — PREGABALIN 200 MG: 100 CAPSULE ORAL at 08:54

## 2024-07-18 RX ADMIN — ACETAMINOPHEN 650 MG: 325 TABLET ORAL at 18:08

## 2024-07-18 RX ADMIN — BUPROPION HYDROCHLORIDE 300 MG: 150 TABLET, EXTENDED RELEASE ORAL at 08:53

## 2024-07-18 RX ADMIN — PANTOPRAZOLE SODIUM 40 MG: 40 TABLET, DELAYED RELEASE ORAL at 08:53

## 2024-07-18 RX ADMIN — VANCOMYCIN HYDROCHLORIDE 750 MG: 750 INJECTION, SOLUTION INTRAVENOUS at 06:12

## 2024-07-18 NOTE — PROGRESS NOTES
Lona Cedeno is a 78 y.o. female who is currently ordered Vancomycin IV with management by the Pharmacy Consult service.  Relevant clinical data and objective / subjective history reviewed.  Vancomycin Assessment:  Indication and Goal AUC/Trough: Soft tissue (goal -600, trough >10), -600, trough >10  Clinical Status: stable  Micro:     Renal Function:  SCr: 0.58 mg/dL  CrCl: 78.8 mL/min  Renal replacement: Not on dialysis  Days of Therapy: 3  Current Dose: 750mg IV q12h  Vancomycin Plan:  New Dosinmg IV q12h  Estimated AUC: 475 mcg*hr/mL  Estimated Trough: 13.1 mcg/mL  Next Level: 7 AM labs  Renal Function Monitoring: Daily BMP and UOP  Pharmacy will continue to follow closely for s/sx of nephrotoxicity, infusion reactions and appropriateness of therapy.  BMP and CBC will be ordered per protocol. We will continue to follow the patient’s culture results and clinical progress daily.    Gibson Liriano, Pharmacist

## 2024-07-18 NOTE — NURSING NOTE
AWAKE AND RESP EASY AND NO DISTRESS. 02 SAT 91% HR 75/MIN. NO DISTRESS. CALL BELL NEAR AND NO SOB AT REST.

## 2024-07-18 NOTE — PROGRESS NOTES
[facility]  Progress Note  Name: Lona Cedeno I  MRN: [id number]  Unit/Bed#: -01 I Date of Admission: 7/16/2024   Date of Service: 7/18/2024 I Hospital Day: 2    Assessment & Plan   * Sepsis due to pneumonia (HCC)  Assessment & Plan  Patient initially met sepsis criteria in that she was febrile with a Tmax of 100.6, tachycardic with a heart rate as high as 102, tachypneic with a respiratory rate as high as 30, leukocytosis of 19.83 and a known source of infection being right lower lobe pneumonia  All sepsis parameters have resolved-patient is no longer febrile, tachycardic, tachypneic, and her white count has come down to 11.69  Testing for COVID-19, RSV, and influenza is negative  Urine testing for both Streptococcus pneumonia and Legionella antigen is negative  Blood cultures x 2 sets-no growth to date  Okay to discontinue IV fluid  Status post treatment with 2 days worth of IV cefepime, and vancomycin  Okay to discontinue vancomycin  Continue monotherapy with IV cefepime  If the patient continues to get better, hopeful discharge planning in 24 to 48 hours    Acute respiratory failure with hypoxia (HCC)  Assessment & Plan  Oxygen requirement remains at 3 L of supplemental oxygen by nasal cannula  Will recheck a portable chest x-ray  Wean oxygen as able  Secondary to the pneumonia on the right lower lobe as outlined above    Open wound of right foot  Assessment & Plan  Patient has been following as an outpatient with wound care  Status post a podiatry evaluation  No further inpatient testing, treatment, and/or workup  Continue supportive/local wound care    Acute metabolic encephalopathy  Assessment & Plan  Resolved  Secondary to sepsis  Continue close monitoring    Essential hypertension  Assessment & Plan  Blood pressure stable   Continue amiloride and lisinopril    Type 2 diabetes mellitus with diabetic polyneuropathy (HCC)  Assessment & Plan  Lab Results   Component Value Date     HGBA1C 7.6 (H) 06/08/2024       Recent Labs     07/17/24  1607 07/17/24  2110 07/18/24  0736 07/18/24  1103   POCGLU 124 172* 147* 176*         Blood Sugar Average: Last 72 hrs:  (P) 150.0866290443055097    Oral hypoglycemic medications remain on hold  Target blood sugar for the hospital is 140-180  Continue insulin, Accu-Cheks before meals and at bedtime with sliding scale coverage  Diabetic neuropathy-continue pregabalin    COPD, severity to be determined (HCC)  Assessment & Plan  Without exacerbation   continue prehospital Proventil 90 mcg 2 puffs every 6 hours as needed substitute Breo 100/25 mcg 5 daily for prehospital Advair    GERD without esophagitis  Assessment & Plan  Continue prehospital Protonix 40 mg p.o. daily    Anxiety and depression  Assessment & Plan  Continue prehospital Wellbutrin  mg p.o. daily    Anemia  Assessment & Plan  Hemoglobin is stable  Mild drop is probably secondary to dilutional effect IV fluid that the patient received  Continue prehospital vitamin B12 1000 mcg p.o. daily  Continue to monitor with repeat labs in a.m.    Hyperkalemia  Assessment & Plan  Resolved    Tobacco use disorder  Assessment & Plan  Cessation counseling provided  Continue nicotine patch    PAD (peripheral artery disease) ([facility])  Assessment & Plan  Continue aspirin, Lipitor, and Xarelto               VTE Pharmacologic Prophylaxis: VTE Score: 7 High Risk (Score >/= 5) - Pharmacological DVT Prophylaxis Ordered: rivaroxaban (Xarelto). Sequential Compression Devices Ordered.    Mobility:   Basic Mobility Inpatient Raw Score: 19  JH-HLM Goal: 6: Walk 10 steps or more  JH-HLM Achieved: 5: Stand (1 or more minutes)  JH-HLM Goal achieved. Continue to encourage appropriate mobility.    Patient Centered Rounds: I performed bedside rounds with nursing staff today.   Discussions with Specialists or Other Care Team Provider: Case management    Education and Discussions with Family / Patient: Patient declined call to  .     Total Time Spent on Date of Encounter in care of patient: 35 mins. This time was spent on one or more of the following: performing physical exam; counseling and coordination of care; obtaining or reviewing history; documenting in the medical record; reviewing/ordering tests, medications or procedures; communicating with other healthcare professionals and discussing with patient's family/caregivers.    Current Length of Stay: 2 day(s)  Current Patient Status: Inpatient   Certification Statement: The patient will continue to require additional inpatient hospital stay due to need for continued antibiotics  Discharge Plan: Anticipate discharge in 24-48 hrs to home.    Code Status: Level 1 - Full Code    Subjective:   Patient seen, reports feeling better, continues to have some weakness.  But denies pain    Objective:     Vitals:   Temp (24hrs), Av.3 °F (36.8 °C), Min:97.7 °F (36.5 °C), Max:98.9 °F (37.2 °C)    Temp:  [97.7 °F (36.5 °C)-98.9 °F (37.2 °C)] 98 °F (36.7 °C)  HR:  [71-82] 75  Resp:  [16-18] 16  BP: (111-161)/(38-71) 161/71  SpO2:  [88 %-94 %] 93 %  Body mass index is 31.74 kg/m².     Input and Output Summary (last 24 hours):     Intake/Output Summary (Last 24 hours) at 2024 1214  Last data filed at 2024 0900  Gross per 24 hour   Intake 2485 ml   Output 0 ml   Net 2485 ml       Physical Exam:   Physical Exam  Vitals and nursing note reviewed.   Constitutional:       General: She is not in acute distress.     Appearance: Normal appearance. She is not ill-appearing.   HENT:      Head: Normocephalic and atraumatic.      Nose: Nose normal.   Eyes:      Extraocular Movements: Extraocular movements intact.      Pupils: Pupils are equal, round, and reactive to light.   Cardiovascular:      Rate and Rhythm: Normal rate and regular rhythm.      Pulses: Normal pulses.      Heart sounds: Normal heart sounds. No murmur heard.     No friction rub. No gallop.   Pulmonary:      Effort:  Pulmonary effort is normal.      Breath sounds: Normal breath sounds.   Abdominal:      General: There is no distension.      Palpations: Abdomen is soft. There is no mass.      Tenderness: There is no abdominal tenderness. There is no guarding or rebound.   Musculoskeletal:         General: No swelling or tenderness. Normal range of motion.      Cervical back: Normal range of motion and neck supple. No rigidity. No muscular tenderness.      Right lower leg: No edema.      Left lower leg: No edema.      Comments: Status post partial amputation of the right foot, right foot dressing is in place, dry, and intact otherwise   Skin:     General: Skin is warm.      Capillary Refill: Capillary refill takes less than 2 seconds.      Findings: No erythema or rash.   Neurological:      General: No focal deficit present.      Mental Status: She is alert and oriented to person, place, and time. Mental status is at baseline.   Psychiatric:         Mood and Affect: Mood normal.         Behavior: Behavior normal.          Additional Data:     Labs:  Results from last 7 days   Lab Units 07/18/24  0445   WBC Thousand/uL 11.69*   HEMOGLOBIN g/dL 11.6   HEMATOCRIT % 38.2   PLATELETS Thousands/uL 190   SEGS PCT % 84*   LYMPHO PCT % 6*   MONO PCT % 6   EOS PCT % 4     Results from last 7 days   Lab Units 07/18/24  0445   SODIUM mmol/L 134*   POTASSIUM mmol/L 3.8   CHLORIDE mmol/L 101   CO2 mmol/L 28   BUN mg/dL 10   CREATININE mg/dL 0.55*   ANION GAP mmol/L 5   CALCIUM mg/dL 9.3   ALBUMIN g/dL 3.6   TOTAL BILIRUBIN mg/dL 0.31   ALK PHOS U/L 53   ALT U/L 9   AST U/L 14   GLUCOSE RANDOM mg/dL 141*     Results from last 7 days   Lab Units 07/16/24  1757   INR  1.17     Results from last 7 days   Lab Units 07/18/24  1103 07/18/24  0736 07/17/24  2110 07/17/24  1607 07/17/24  1338 07/16/24  2226   POC GLUCOSE mg/dl 176* 147* 172* 124 139 144*         Results from last 7 days   Lab Units 07/17/24  0622 07/16/24  1757   LACTIC ACID mmol/L  --   2.0   PROCALCITONIN ng/ml 0.86* 0.76*       Lines/Drains:  Invasive Devices       Peripheral Intravenous Line  Duration             Peripheral IV 07/18/24 Right;Ventral (anterior) Forearm <1 day                          Imaging: No pertinent imaging reviewed.    Recent Cultures (last 7 days):   Results from last 7 days   Lab Units 07/16/24  2236 07/16/24  1757   BLOOD CULTURE   --  No Growth at 24 hrs.  No Growth at 24 hrs.   LEGIONELLA URINARY ANTIGEN  Negative  --        Last 24 Hours Medication List:   Current Facility-Administered Medications   Medication Dose Route Frequency Provider Last Rate    acetaminophen  650 mg Oral Q6H PRN Rigo Garcia PA-C      albuterol  2 puff Inhalation Q6H PRN Rigo Garcia PA-C      AMILoride  5 mg Oral Daily Rigo Garcia PA-C      ammonium lactate   Topical Daily Rigo Garcia PA-C      aspirin  81 mg Oral Daily Rigo Garcia PA-C      atorvastatin  10 mg Oral Daily With Dinner Rigo Garcia PA-C      buPROPion  300 mg Oral Daily Rigo Garcia PA-C      cefepime  2,000 mg Intravenous Q12H CONCEPCION DianeC 2,000 mg (07/18/24 0529)    cyanocobalamin  1,000 mcg Oral Daily Rigo Garcia PA-C      dextromethorphan-guaiFENesin  10 mL Oral Q4H PRN Rigo Garcia PA-C      Fluticasone Furoate-Vilanterol  1 puff Inhalation Daily Rigo Garcia PA-C      insulin lispro  1-6 Units Subcutaneous TID AC Rigo Garcia PA-C      insulin lispro  1-6 Units Subcutaneous HS Rigo Garcia PA-C      lisinopril  5 mg Oral Daily Rigo Garcia PA-C      mupirocin   Topical Daily Rigo Garcia PA-C      nicotine  1 patch Transdermal Daily Rigo Garcia PA-C      ondansetron  4 mg Intravenous Q6H PRN Rigo Garcia PA-C      oxybutynin  5 mg Oral Daily Rigo Garcia PA-C      pantoprazole  40 mg Oral Daily Rigo Garcia PA-C      polyethylene glycol  17 g Oral Daily Rigo Garcia PA-C      pregabalin  200 mg Oral TID Rigo Garcia PA-C      rivaroxaban  2.5 mg Oral BID Rigo Garcia PA-C          Today,  Patient Was Seen By: Claudine Pretty MD    **Please Note: This note may have been constructed using a voice recognition system.**

## 2024-07-18 NOTE — WOUND OSTOMY CARE
Progress Note - Wound   Lona Cedeno 78 y.o. female MRN: [id number]  Unit/Bed#: -01 Encounter: [id number]      Assessment:   Patient admitted to [facility] due to sepsis. History of COPD, diabetes, GERD, HLD, HTN, IBS, PAD. Wound care consulted for right ankle wound. Patient is agreeable to assessment, alert and oriented x4, continent of bowel and bladder, standby assist to ambulate, is a minimal assist with care. Patient states she has visiting nurses to help with wound care that stopped but she has wound care products at home to do dressing changes. Podiatry assessed and placed wound care orders; podiatry signed off, wound care nurse will continue to follow with inpatient.     1. Bilateral sacrum, buttock, hips, elbows, and heels- skin is dry, intact, blanchable.    2. Right posterior ankle- Patient states she had a skin graft placed to this wound site in April. Wound is oval in shape, partial thickness, approx. 5% small area of brown tissue and 95% pink partial thickness tissue, with scant amount of serous drainage noted. Ani-wound is dry, intact, blanchable.    Educated patient on importance of frequent offloading of pressure via turning, repositioning, and weight-shifting. Verbalized understanding of plan of care.    No induration, fluctuance, odor, warmth, redness, or purulence noted to the above noted wound. New dressings applied. Patient tolerated well, denies pain to the wound. Primary nurse aware of plan of care. See flow sheets for more detailed assessment findings. Will follow along.        Skin care plans:  1-Hydraguard to bilateral sacrum, buttock and heels BID and PRN  2-Elevate heels to offload pressure.  3-Ehob cushion in chair when out of bed.  4-Moisturize skin daily with skin nourishing cream.  5-Turn/reposition q2h for pressure re-distribution on skin.   6-Right posterior ankle- Cleanse wound with NSS, pat dry. Apply Xeroform over wound bed, cover with 4x4, wrap with Alicia. Change daily and as  needed for soilage/displacement.      Wound 07/17/24 Diabetic Ulcer Tibial Distal;Posterior;Right (Active)   Wound Image   07/18/24 1116   Wound Description Brown;Amber 07/18/24 1116   Ani-wound Assessment Dry; intact;Amber 07/18/24 1116   Wound Length (cm) 1 cm 07/18/24 1116   Wound Width (cm) 1.5 cm 07/18/24 1116   Wound Depth (cm) 0.1 cm 07/18/24 1116   Wound Surface Area (cm^2) 1.5 cm^2 07/18/24 1116   Wound Volume (cm^3) 0.15 cm^3 07/18/24 1116   Calculated Wound Volume (cm^3) 0.15 cm^3 07/18/24 1116   Drainage Amount scant 07/18/24 1116   Drainage Description Serous 07/18/24 1116   Non-staged Wound Description Partial thickness 07/18/24 1116   Treatments Cleansed;Irrigation with NSS;Site care 07/18/24 1116   Dressing Dermagran gauze;Dry dressing 07/18/24 1116   Wound packed? No 07/18/24 1116   Dressing Changed Changed 07/18/24 1116   Patient Tolerance Tolerated well 07/18/24 1116   Dressing Status Clean;Dry;Intact 07/18/24 1116     Contact through [employer] Secure Chat with any questions  Wound Care will continue to follow    Carolina TRIVEDIN RN CWON  Wound and Ostomy care

## 2024-07-18 NOTE — PLAN OF CARE
Problem: Potential for Falls  Goal: Patient will remain free of falls  Description: INTERVENTIONS:  - Educate patient/family on patient safety including physical limitations  - Instruct patient to call for assistance with activity   - Consult OT/PT to assist with strengthening/mobility   - Keep Call bell within reach  - Keep bed low and locked with side rails adjusted as appropriate  - Keep care items and personal belongings within reach  - Initiate and maintain comfort rounds  - Make Fall Risk Sign visible to staff  - Offer Toileting every 2 Hours, in advance of need  - Initiate/Maintain bed alarm  - Obtain necessary fall risk management equipment: alarms, socks  - Apply yellow socks and bracelet for high fall risk patients  - Consider moving patient to room near nurses station  Outcome: Progressing

## 2024-07-18 NOTE — ASSESSMENT & PLAN NOTE
Oxygen requirement remains at 3 L of supplemental oxygen by nasal cannula  Will recheck a portable chest x-ray  Wean oxygen as able  Secondary to the pneumonia on the right lower lobe as outlined above

## 2024-07-18 NOTE — DISCHARGE INSTR - OTHER ORDERS
Skin care plans:  1-Right posterior ankle- Cleanse wound with NSS, pat dry. Apply Xeroform over wound bed, cover with 4x4, wrap with Alicia. Change daily and as needed for soilage/displacement.

## 2024-07-18 NOTE — CASE MANAGEMENT
Case Management Assessment & Discharge Planning Note    Patient name Lona Cedeno  Location /-01 MRN [id number]  : 1945 Date 2024       Current Admission Date: 2024  Current Admission Diagnosis:Sepsis due to pneumonia (HCC)   Patient Active Problem List    Diagnosis Date Noted Date Diagnosed    Sepsis due to pneumonia (HCC) 2024     Open wound of right foot 2024     Hyperkalemia 2024     Acute metabolic encephalopathy 2024     Toxic encephalopathy 2024     Acute respiratory failure with hypoxia (HCC) 2024     Sacral wound 2024     Other dietary vitamin B12 deficiency anemia 2024     Leukocytosis 2024     Sepsis without acute organ dysfunction (HCC) 2024     At risk for venous thromboembolism (VTE) 2024     At high risk for skin breakdown 2024     Wound of skin 2024     Impaired mobility and activities of daily living 2024     Anemia 2024     Constipation 2024     Age-related osteoporosis without current pathological fracture 2023     Abnormal CT of the chest 10/17/2023     COPD, severity to be determined ([facility]) 10/17/2023     Tobacco use disorder 10/17/2023     PAD (peripheral artery disease) ([facility]) 10/10/2023     Bladder neoplasm 2023     Left renal mass 2023     Degenerative lumbar disc 2023     Urinary incontinence 2023     GERD without esophagitis 10/16/2019     Essential hypertension 02/15/2019     Anxiety and depression 02/15/2019     Type 2 diabetes mellitus with diabetic polyneuropathy ([facility]) 2019       LOS (days): 2  Geometric Mean LOS (GMLOS) (days): 5.1  Days to GMLOS:3.2     OBJECTIVE:    Risk of Unplanned Readmission Score: 31.55         Current admission status: Inpatient  Referral Reason: Other (D/C planning)    Preferred Pharmacy:   [id number] - [place] - [address] [place] [address] [place]  [address]  Phone: [phone] Fax: [phone]    [place] - [address]  [address] [place] [address]  Phone: [phone] Fax: [phone]    [facility] [place] - [address]  [address]  [place] [address]  Phone: [phone] Fax: [phone]    Primary Care Provider: Vivek Preston DO    Primary Insurance: [facility] REP  Secondary Insurance:     ASSESSMENT:  Active Health Care Proxies       Carmelo Cedeno [facility] Care Representative - Spouse   Primary Phone: [phone] (Home)                 Advance Directives  Does patient have a Health Care POA?: Yes  Does patient have Advance Directives?: Yes  Primary Contact: Carmelo Cedeno         Readmission Root Cause  30 Day Readmission: No    Patient Information  Admitted from:: Home  Mental Status: Alert  During Assessment patient was accompanied by: Not accompanied during assessment  Assessment information provided by:: Patient  Primary Caregiver: Self  Support Systems: Spouse/significant other, Children, Daughter, Family members  County of Residence: [place]  What city do you live in?: [place]  Home entry access options. Select all that apply.: Stairs  Number of steps to enter home.: 5  Do the steps have railings?: Yes  Type of Current Residence: [facility]  Living Arrangements: Lives w/ Spouse/significant other, Lives w/ Daughter  Is patient a ?: No    Activities of Daily Living Prior to Admission  Functional Status: Independent  Completes ADLs independently?: Yes  Ambulates independently?: Yes  Does patient use assisted devices?: No  Does patient currently own DME?: Yes  What DME does the patient currently own?: Walker, Wheelchair, Crutches  Does patient have a history of Outpatient Therapy (PT/OT)?: Yes  Does the patient have a history of Short-Term Rehab?: No  Does patient have a history of HHC?: Yes  Does patient currently have HHC?: No         Patient Information  Continued  Income Source: Pension/custodial  Does patient have prescription coverage?: Yes  Does patient receive dialysis treatments?: No  Does patient have a history of substance abuse?: No  Does patient have a history of Mental Health Diagnosis?: No         Means of Transportation  Means of Transport to Appts:: Drives Self      Social Determinants of Health (SDOH)      Flowsheet Row Most Recent Value   Housing Stability    In the last 12 months, was there a time when you were not able to pay the mortgage or rent on time? N   In the past 12 months, how many times have you moved where you were living? 1   At any time in the past 12 months, were you homeless or living in a shelter (including now)? N   Transportation Needs    In the past 12 months, has lack of transportation kept you from medical appointments or from getting medications? no   In the past 12 months, has lack of transportation kept you from meetings, work, or from getting things needed for daily living? No   Food Insecurity    Within the past 12 months, you worried that your food would run out before you got the money to buy more. Never true   Within the past 12 months, the food you bought just didn't last and you didn't have money to get more. Never true   Utilities    In the past 12 months has the electric, gas, oil, or water company threatened to shut off services in your home? No            DISCHARGE DETAILS:    Discharge planning discussed with:: Pt  Freedom of Choice: Yes        Were Treatment Team discharge recommendations reviewed with patient/caregiver?: Yes  Did patient/caregiver verbalize understanding of patient care needs?: Yes  Were patient/caregiver advised of the risks associated with not following Treatment Team discharge recommendations?: Yes    Contacts  Reason/Outcome: Continuity of Care, Discharge Planning    Requested Home Health Care         Is the patient interested in HHC at discharge?: No    DME Referral Provided  Referral  made for DME?: No    Other Referral/Resources/Interventions Provided:  Interventions: None Indicated  Referral Comments: CM met with pt at bedside to conduct assessment. Pt declined HHC & reported no CM needs at this time. Pt's daughter will transport home. CM will continue to follow.    Would you like to participate in our [facility] service program?  : No - Declined    Treatment Team Recommendation: Home  Discharge Destination Plan:: Home  Transport at Discharge : Family

## 2024-07-18 NOTE — PLAN OF CARE
Problem: RESPIRATORY - ADULT  Goal: Achieves optimal ventilation and oxygenation  Description: INTERVENTIONS:  - Assess for changes in respiratory status  - Assess for changes in mentation and behavior  - Position to facilitate oxygenation and minimize respiratory effort  - Oxygen administered by appropriate delivery if ordered  - Initiate smoking cessation education as indicated  - Encourage broncho-pulmonary hygiene including cough, deep breathe, Incentive Spirometry  - Assess the need for suctioning and aspirate as needed  - Assess and instruct to report SOB or any respiratory difficulty  - Respiratory Therapy support as indicated  Outcome: Progressing   Weaned to 2L NC.  ICS at bedside.

## 2024-07-18 NOTE — ASSESSMENT & PLAN NOTE
Hemoglobin is stable  Mild drop is probably secondary to dilutional effect IV fluid that the patient received  Continue prehospital vitamin B12 1000 mcg p.o. daily  Continue to monitor with repeat labs in a.m.

## 2024-07-18 NOTE — PLAN OF CARE
Problem: Potential for Falls  Goal: Patient will remain free of falls  Description: INTERVENTIONS:  - Educate patient/family on patient safety including physical limitations  - Instruct patient to call for assistance with activity   - Consult OT/PT to assist with strengthening/mobility   - Keep Call bell within reach  - Keep bed low and locked with side rails adjusted as appropriate  - Keep care items and personal belongings within reach  - Initiate and maintain comfort rounds  - Make Fall Risk Sign visible to staff  - Offer Toileting every 2 Hours, in advance of need  - Initiate/Maintain bed alarm  - Obtain necessary fall risk management equipment: alarms, socks  - Apply yellow socks and bracelet for high fall risk patients  - Consider moving patient to room near nurses station  7/18/2024 0409 by Luz Maria Jacobs, RN  Outcome: Progressing  7/17/2024 5526 by Luz Maria Jacobs, RN  Outcome: Progressing

## 2024-07-18 NOTE — ASSESSMENT & PLAN NOTE
Patient has been following as an outpatient with wound care  Status post a podiatry evaluation  No further inpatient testing, treatment, and/or workup  Continue supportive/local wound care

## 2024-07-18 NOTE — NURSING NOTE
IV infiltrated during bedside shift report. IV removed, warm compress applied and R arm elevated on three pillows. +3 cap refill, +2 R radial pulse, pt c/o mild numbness to R Hand. Denies SOB, VSS. Pt requests new IV placement after breakfast.

## 2024-07-18 NOTE — ASSESSMENT & PLAN NOTE
Lab Results   Component Value Date    HGBA1C 7.6 (H) 06/08/2024       Recent Labs     07/17/24  1607 07/17/24  2110 07/18/24  0736 07/18/24  1103   POCGLU 124 172* 147* 176*         Blood Sugar Average: Last 72 hrs:  (P) [id number]    Oral hypoglycemic medications remain on hold  Target blood sugar for the hospital is 140-180  Continue insulin, Accu-Cheks before meals and at bedtime with sliding scale coverage  Diabetic neuropathy-continue pregabalin

## 2024-07-18 NOTE — NURSING NOTE
AWAKE AND 02 SAT 96% HR 79/MIN. NO DISTRESS. CALL BELL NEAR. 02 Maintained 3 liters n/c. Call bell near and bed alarm on

## 2024-07-18 NOTE — ASSESSMENT & PLAN NOTE
Without exacerbation   continue prehospital Proventil 90 mcg 2 puffs every 6 hours as needed substitute Breo 100/25 mcg 5 daily for prehospital Advair

## 2024-07-19 VITALS
WEIGHT: 167.99 LBS | OXYGEN SATURATION: 97 % | HEIGHT: 61 IN | SYSTOLIC BLOOD PRESSURE: 148 MMHG | HEART RATE: 81 BPM | BODY MASS INDEX: 31.72 KG/M2 | DIASTOLIC BLOOD PRESSURE: 68 MMHG | RESPIRATION RATE: 19 BRPM | TEMPERATURE: 97.9 F

## 2024-07-19 LAB
ANION GAP SERPL CALCULATED.3IONS-SCNC: 5 MMOL/L (ref 4–13)
BASOPHILS # BLD AUTO: 0.03 THOUSANDS/ÂΜL (ref 0–0.1)
BASOPHILS NFR BLD AUTO: 0 % (ref 0–1)
BUN SERPL-MCNC: 8 MG/DL (ref 5–25)
CALCIUM SERPL-MCNC: 9.9 MG/DL (ref 8.4–10.2)
CHLORIDE SERPL-SCNC: 101 MMOL/L (ref 96–108)
CO2 SERPL-SCNC: 31 MMOL/L (ref 21–32)
CREAT SERPL-MCNC: 0.49 MG/DL (ref 0.6–1.3)
EOSINOPHIL # BLD AUTO: 0.52 THOUSAND/ÂΜL (ref 0–0.61)
EOSINOPHIL NFR BLD AUTO: 6 % (ref 0–6)
ERYTHROCYTE [DISTWIDTH] IN BLOOD BY AUTOMATED COUNT: 15.1 % (ref 11.6–15.1)
GFR SERPL CREATININE-BSD FRML MDRD: 94 ML/MIN/1.73SQ M
GLUCOSE SERPL-MCNC: 104 MG/DL (ref 65–140)
GLUCOSE SERPL-MCNC: 146 MG/DL (ref 65–140)
GLUCOSE SERPL-MCNC: 161 MG/DL (ref 65–140)
GLUCOSE SERPL-MCNC: 179 MG/DL (ref 65–140)
HCT VFR BLD AUTO: 36.8 % (ref 34.8–46.1)
HGB BLD-MCNC: 11.5 G/DL (ref 11.5–15.4)
IMM GRANULOCYTES # BLD AUTO: 0.06 THOUSAND/UL (ref 0–0.2)
IMM GRANULOCYTES NFR BLD AUTO: 1 % (ref 0–2)
LYMPHOCYTES # BLD AUTO: 0.56 THOUSANDS/ÂΜL (ref 0.6–4.47)
LYMPHOCYTES NFR BLD AUTO: 6 % (ref 14–44)
MCH RBC QN AUTO: 28.6 PG (ref 26.8–34.3)
MCHC RBC AUTO-ENTMCNC: 31.3 G/DL (ref 31.4–37.4)
MCV RBC AUTO: 92 FL (ref 82–98)
MONOCYTES # BLD AUTO: 0.66 THOUSAND/ÂΜL (ref 0.17–1.22)
MONOCYTES NFR BLD AUTO: 7 % (ref 4–12)
NEUTROPHILS # BLD AUTO: 7.45 THOUSANDS/ÂΜL (ref 1.85–7.62)
NEUTS SEG NFR BLD AUTO: 80 % (ref 43–75)
NRBC BLD AUTO-RTO: 0 /100 WBCS
PLATELET # BLD AUTO: 231 THOUSANDS/UL (ref 149–390)
PMV BLD AUTO: 9.8 FL (ref 8.9–12.7)
POTASSIUM SERPL-SCNC: 4.4 MMOL/L (ref 3.5–5.3)
RBC # BLD AUTO: 4.02 MILLION/UL (ref 3.81–5.12)
SODIUM SERPL-SCNC: 137 MMOL/L (ref 135–147)
WBC # BLD AUTO: 9.28 THOUSAND/UL (ref 4.31–10.16)

## 2024-07-19 PROCEDURE — 80048 BASIC METABOLIC PNL TOTAL CA: CPT | Performed by: HOSPITALIST

## 2024-07-19 PROCEDURE — 85025 COMPLETE CBC W/AUTO DIFF WBC: CPT | Performed by: HOSPITALIST

## 2024-07-19 PROCEDURE — 99239 HOSP IP/OBS DSCHRG MGMT >30: CPT | Performed by: HOSPITALIST

## 2024-07-19 PROCEDURE — 82948 REAGENT STRIP/BLOOD GLUCOSE: CPT

## 2024-07-19 RX ORDER — CEFUROXIME AXETIL 500 MG/1
500 TABLET ORAL EVERY 12 HOURS SCHEDULED
Qty: 8 TABLET | Refills: 0 | Status: SHIPPED | OUTPATIENT
Start: 2024-07-19 | End: 2024-07-24

## 2024-07-19 RX ADMIN — Medication: at 08:29

## 2024-07-19 RX ADMIN — NICOTINE 1 PATCH: 21 PATCH, EXTENDED RELEASE TRANSDERMAL at 08:30

## 2024-07-19 RX ADMIN — PANTOPRAZOLE SODIUM 40 MG: 40 TABLET, DELAYED RELEASE ORAL at 08:28

## 2024-07-19 RX ADMIN — INSULIN LISPRO 1 UNITS: 100 INJECTION, SOLUTION INTRAVENOUS; SUBCUTANEOUS at 08:29

## 2024-07-19 RX ADMIN — POLYETHYLENE GLYCOL 3350 17 G: 17 POWDER, FOR SOLUTION ORAL at 08:28

## 2024-07-19 RX ADMIN — RIVAROXABAN 2.5 MG: 2.5 TABLET, FILM COATED ORAL at 08:30

## 2024-07-19 RX ADMIN — BUPROPION HYDROCHLORIDE 300 MG: 150 TABLET, EXTENDED RELEASE ORAL at 08:28

## 2024-07-19 RX ADMIN — FLUTICASONE FUROATE AND VILANTEROL TRIFENATATE 1 PUFF: 100; 25 POWDER RESPIRATORY (INHALATION) at 08:29

## 2024-07-19 RX ADMIN — OXYBUTYNIN 5 MG: 5 TABLET, FILM COATED, EXTENDED RELEASE ORAL at 08:28

## 2024-07-19 RX ADMIN — PREGABALIN 200 MG: 100 CAPSULE ORAL at 08:28

## 2024-07-19 RX ADMIN — CYANOCOBALAMIN TAB 500 MCG 1000 MCG: 500 TAB at 08:28

## 2024-07-19 RX ADMIN — LISINOPRIL 5 MG: 5 TABLET ORAL at 08:28

## 2024-07-19 RX ADMIN — MUPIROCIN: 20 OINTMENT TOPICAL at 08:29

## 2024-07-19 RX ADMIN — CEFUROXIME AXETIL 500 MG: 250 TABLET, FILM COATED ORAL at 08:28

## 2024-07-19 RX ADMIN — ASPIRIN 81 MG: 81 TABLET, COATED ORAL at 08:28

## 2024-07-19 RX ADMIN — AMILORIDE HYDROCLORIDE 5 MG: 5 TABLET ORAL at 08:28

## 2024-07-19 NOTE — ASSESSMENT & PLAN NOTE
Lab Results   Component Value Date    HGBA1C 7.6 (H) 06/08/2024       Recent Labs     07/18/24  1609 07/18/24 2052 07/19/24 0727 07/19/24  1117   POCGLU 108 232* 161* 104         Blood Sugar Average: Last 72 hrs:  (P) 150.7    Dc home on pre admit DM meds at the same doses

## 2024-07-19 NOTE — ASSESSMENT & PLAN NOTE
Patient initially met sepsis criteria in that she was febrile with a Tmax of 100.6, tachycardic with a heart rate as high as 102, tachypneic with a respiratory rate as high as 30, leukocytosis of 19.83 and a known source of infection being right lower lobe pneumonia  All sepsis parameters have resolved-patient is no longer febrile, tachycardic, tachypneic, and her white count has come down to 9.28  Testing for COVID-19, RSV, and influenza is negative  Urine testing for both Streptococcus pneumonia and Legionella antigen is negative  Blood cultures x 2 sets-no growth to date  S/P 3 days of IV abx - Vanc and Cefepime  DC home on 4 more days of Ceftin  Prescription provided for 4 more days of Ceftin 500mg po q12h  DC home on all other pre-admit meds at pre admit doses  OP follow up with PCP in 7-10 days

## 2024-07-19 NOTE — ASSESSMENT & PLAN NOTE
Resolved - patient is now on room air  Secondary to the pneumonia on the right lower lobe as outlined above  OK for Dc Home

## 2024-07-19 NOTE — ASSESSMENT & PLAN NOTE
Patient has been following as an outpatient with wound care  Status post a podiatry evaluation  No further inpatient testing, treatment, and/or workup  OP follow up with Dr. Spaulding/wound care clinic

## 2024-07-19 NOTE — ASSESSMENT & PLAN NOTE
Hemoglobin is stable  Mild drop is probably secondary to dilutional effect IV fluid that the patient received  Continue prehospital vitamin B12 1000 mcg p.o. daily  Will need continued periodic outp cbc monitoring via her PCP

## 2024-07-19 NOTE — ASSESSMENT & PLAN NOTE
Without exacerbation   continue prehospital Proventil 90 mcg 2 puffs every 6 hours and Advair as the patient was taking prior to arrival

## 2024-07-19 NOTE — PLAN OF CARE
Problem: Potential for Falls  Goal: Patient will remain free of falls  Description: INTERVENTIONS:  - Educate patient/family on patient safety including physical limitations  - Instruct patient to call for assistance with activity   - Consult OT/PT to assist with strengthening/mobility   - Keep Call bell within reach  - Keep bed low and locked with side rails adjusted as appropriate  - Keep care items and personal belongings within reach  - Initiate and maintain comfort rounds  - Make Fall Risk Sign visible to staff  - Offer Toileting every 2 Hours, in advance of need  - Initiate/Maintain bed alarm  - Obtain necessary fall risk management equipment: alarms, socks  - Apply yellow socks and bracelet for high fall risk patients  - Consider moving patient to room near nurses station  7/19/2024 1705 by Mojgan Alamo RN  Outcome: Adequate for Discharge  7/19/2024 1011 by Mojgan Alamo RN  Outcome: Progressing     Problem: PAIN - ADULT  Goal: Verbalizes/displays adequate comfort level or baseline comfort level  Description: Interventions:  - Encourage patient to monitor pain and request assistance  - Assess pain using appropriate pain scale  - Administer analgesics based on type and severity of pain and evaluate response  - Implement non-pharmacological measures as appropriate and evaluate response  - Consider cultural and social influences on pain and pain management  - Notify physician/advanced practitioner if interventions unsuccessful or patient reports new pain  7/19/2024 1705 by Mojgan Alamo RN  Outcome: Adequate for Discharge  7/19/2024 1011 by Mojgan Alamo RN  Outcome: Progressing     Problem: INFECTION - ADULT  Goal: Absence or prevention of progression during hospitalization  Description: INTERVENTIONS:  - Assess and monitor for signs and symptoms of infection  - Monitor lab/diagnostic results  - Monitor all insertion sites, i.e. indwelling lines, tubes, and drains  - Monitor endotracheal if appropriate and nasal  secretions for changes in amount and color  - [place] appropriate cooling/warming therapies per order  - Administer medications as ordered  - Instruct and encourage patient and family to use good hand hygiene technique  - Identify and instruct in appropriate isolation precautions for identified infection/condition  7/19/2024 1705 by Mojgan Alamo RN  Outcome: Adequate for Discharge  7/19/2024 1011 by Mojgan Alamo RN  Outcome: Progressing     Problem: DISCHARGE PLANNING  Goal: Discharge to home or other facility with appropriate resources  Description: INTERVENTIONS:  - Identify barriers to discharge w/patient and caregiver  - Arrange for needed discharge resources and transportation as appropriate  - Identify discharge learning needs (meds, wound care, etc.)  - Arrange for interpretive services to assist at discharge as needed  - Refer to Case Management Department for coordinating discharge planning if the patient needs post-hospital services based on physician/advanced practitioner order or complex needs related to functional status, cognitive ability, or social support system  7/19/2024 1705 by Mojgan Alamo RN  Outcome: Adequate for Discharge  7/19/2024 1011 by Mojgan Alamo RN  Outcome: Progressing     Problem: Knowledge Deficit  Goal: Patient/family/caregiver demonstrates understanding of disease process, treatment plan, medications, and discharge instructions  Description: Complete learning assessment and assess knowledge base.  Interventions:  - Provide teaching at level of understanding  - Provide teaching via preferred learning methods  7/19/2024 1705 by Mojgan Alamo RN  Outcome: Adequate for Discharge  7/19/2024 1011 by Mojgan Alamo RN  Outcome: Progressing     Problem: RESPIRATORY - ADULT  Goal: Achieves optimal ventilation and oxygenation  Description: INTERVENTIONS:  - Assess for changes in respiratory status  - Assess for changes in mentation and behavior  - Position to facilitate oxygenation and minimize  respiratory effort  - Oxygen administered by appropriate delivery if ordered  - Initiate smoking cessation education as indicated  - Encourage broncho-pulmonary hygiene including cough, deep breathe, Incentive Spirometry  - Assess the need for suctioning and aspirate as needed  - Assess and instruct to report SOB or any respiratory difficulty  - Respiratory Therapy support as indicated  7/19/2024 1705 by Mojgan Alamo RN  Outcome: Adequate for Discharge  7/19/2024 1011 by Mojgan Alamo RN  Outcome: Progressing     Problem: Prexisting or High Potential for Compromised Skin Integrity  Goal: Skin integrity is maintained or improved  Description: INTERVENTIONS:  - Identify patients at risk for skin breakdown  - Assess and monitor skin integrity  - Assess and monitor nutrition and hydration status  - Monitor labs   - Assess for incontinence   - Turn and reposition patient  - Assist with mobility/ambulation  - Relieve pressure over bony prominences  - Avoid friction and shearing  - Provide appropriate hygiene as needed including keeping skin clean and dry  - Evaluate need for skin moisturizer/barrier cream  - Collaborate with interdisciplinary team   - Patient/family teaching  - Consider wound care consult   7/19/2024 1705 by Mojgan Alamo RN  Outcome: Adequate for Discharge  7/19/2024 1011 by Mojgan Alamo RN  Outcome: Progressing

## 2024-07-19 NOTE — DISCHARGE INSTR - AVS FIRST PAGE
Dear Lona Cedeno,     It was our pleasure to care for you here at [facility].  It is our hope that we were always able to exceed the expected standards for your care during your stay.  You were hospitalized due to Pneumonia.  You were cared for on the medical floor by Claudine Pretty MD with the [facility] who covers for your primary care physician (PCP), Vivek Preston DO, while you were hospitalized.  If you have any questions or concerns related to this hospitalization, you may contact us at .  For follow up as well as any medication refills, we recommend that you follow up with your primary care physician.  A registered nurse will reach out to you by phone within a few days after your discharge to answer any additional questions that you may have after going home.  However, at this time we provide for you here, the most important instructions / recommendations at discharge:     Notable Medication Adjustments -   New prescription - Ceftin 500mg - take 1 tablet every 12 hours then stop  OK to continue all other preadmit meds at the same doses with no changes  Testing Required after Discharge -   TBD by your PCP  Important follow up information -   Please follow up with your PCP within 7-10 days  Other Instructions -   Please refrain from tobacco use  Please maintain a healthy diet  Please review this entire after visit summary as additional general instructions including medication list, appointments, activity, diet, any pertinent wound care, and other additional recommendations from your care team that may be provided for you.      Sincerely,     Claudine Pretty MD

## 2024-07-19 NOTE — CASE MANAGEMENT
Case Management Discharge Planning Note    Patient name Lona Cedeno  Location /-01 MRN [id number]  : 1945 Date 2024       Current Admission Date: 2024  Current Admission Diagnosis:Sepsis due to pneumonia (HCC)   Patient Active Problem List    Diagnosis Date Noted Date Diagnosed    Sepsis due to pneumonia (HCC) 2024     Open wound of right foot 2024     Hyperkalemia 2024     Acute metabolic encephalopathy 2024     Toxic encephalopathy 2024     Acute respiratory failure with hypoxia ([facility]) 2024     Sacral wound 2024     Other dietary vitamin B12 deficiency anemia 2024     Leukocytosis 2024     Sepsis without acute organ dysfunction ([facility]) 2024     At risk for venous thromboembolism (VTE) 2024     At high risk for skin breakdown 2024     Wound of skin 2024     Impaired mobility and activities of daily living 2024     Anemia 2024     Constipation 2024     Age-related osteoporosis without current pathological fracture 2023     Abnormal CT of the chest 10/17/2023     COPD, severity to be determined ([facility]) 10/17/2023     Tobacco use disorder 10/17/2023     PAD (peripheral artery disease) ([facility]) 10/10/2023     Bladder neoplasm 2023     Left renal mass 2023     Degenerative lumbar disc 2023     Urinary incontinence 2023     GERD without esophagitis 10/16/2019     Essential hypertension 02/15/2019     Anxiety and depression 02/15/2019     Type 2 diabetes mellitus with diabetic polyneuropathy ([facility]) 2019       LOS (days): 3  Geometric Mean LOS (GMLOS) (days): 5.1  Days to GMLOS:2.5     OBJECTIVE:  Risk of Unplanned Readmission Score: 31.8         Current admission status: Inpatient   Preferred Pharmacy:   [id number] [address] [place] [address]  Phone: [phone] Fax: [phone]    [facility]  [place], [place] - [address]  [address]  [place] [address]  Phone: [phone] Fax: [phone]    [facility] [place] - [address]  [address]  [place] [address]  Phone: [phone] Fax: [phone]    Primary Care Provider: Vivek Preston DO    Primary Insurance: [facility] REP  Secondary Insurance:     DISCHARGE DETAILS:                                                                                        IMM Given (Date):: 07/19/24  IMM Given to:: Patient     Additional Comments: CM met with pt at bedside to review IMM & obtained verbal consent.

## 2024-07-19 NOTE — PLAN OF CARE
Problem: Potential for Falls  Goal: Patient will remain free of falls  Description: INTERVENTIONS:  - Educate patient/family on patient safety including physical limitations  - Instruct patient to call for assistance with activity   - Consult OT/PT to assist with strengthening/mobility   - Keep Call bell within reach  - Keep bed low and locked with side rails adjusted as appropriate  - Keep care items and personal belongings within reach  - Initiate and maintain comfort rounds  - Make Fall Risk Sign visible to staff  - Offer Toileting every 2 Hours, in advance of need  - Initiate/Maintain bed alarm  - Obtain necessary fall risk management equipment: alarms, socks  - Apply yellow socks and bracelet for high fall risk patients  - Consider moving patient to room near nurses station  Outcome: Progressing     Problem: PAIN - ADULT  Goal: Verbalizes/displays adequate comfort level or baseline comfort level  Description: Interventions:  - Encourage patient to monitor pain and request assistance  - Assess pain using appropriate pain scale  - Administer analgesics based on type and severity of pain and evaluate response  - Implement non-pharmacological measures as appropriate and evaluate response  - Consider cultural and social influences on pain and pain management  - Notify physician/advanced practitioner if interventions unsuccessful or patient reports new pain  Outcome: Progressing     Problem: INFECTION - ADULT  Goal: Absence or prevention of progression during hospitalization  Description: INTERVENTIONS:  - Assess and monitor for signs and symptoms of infection  - Monitor lab/diagnostic results  - Monitor all insertion sites, i.e. indwelling lines, tubes, and drains  - Monitor endotracheal if appropriate and nasal secretions for changes in amount and color  - [place] appropriate cooling/warming therapies per order  - Administer medications as ordered  - Instruct and encourage patient and family to use good hand  hygiene technique  - Identify and instruct in appropriate isolation precautions for identified infection/condition  Outcome: Progressing     Problem: DISCHARGE PLANNING  Goal: Discharge to home or other facility with appropriate resources  Description: INTERVENTIONS:  - Identify barriers to discharge w/patient and caregiver  - Arrange for needed discharge resources and transportation as appropriate  - Identify discharge learning needs (meds, wound care, etc.)  - Arrange for interpretive services to assist at discharge as needed  - Refer to Case Management Department for coordinating discharge planning if the patient needs post-hospital services based on physician/advanced practitioner order or complex needs related to functional status, cognitive ability, or social support system  Outcome: Progressing     Problem: Knowledge Deficit  Goal: Patient/family/caregiver demonstrates understanding of disease process, treatment plan, medications, and discharge instructions  Description: Complete learning assessment and assess knowledge base.  Interventions:  - Provide teaching at level of understanding  - Provide teaching via preferred learning methods  Outcome: Progressing     Problem: RESPIRATORY - ADULT  Goal: Achieves optimal ventilation and oxygenation  Description: INTERVENTIONS:  - Assess for changes in respiratory status  - Assess for changes in mentation and behavior  - Position to facilitate oxygenation and minimize respiratory effort  - Oxygen administered by appropriate delivery if ordered  - Initiate smoking cessation education as indicated  - Encourage broncho-pulmonary hygiene including cough, deep breathe, Incentive Spirometry  - Assess the need for suctioning and aspirate as needed  - Assess and instruct to report SOB or any respiratory difficulty  - Respiratory Therapy support as indicated  Outcome: Progressing     Problem: Prexisting or High Potential for Compromised Skin Integrity  Goal: Skin integrity is  maintained or improved  Description: INTERVENTIONS:  - Identify patients at risk for skin breakdown  - Assess and monitor skin integrity  - Assess and monitor nutrition and hydration status  - Monitor labs   - Assess for incontinence   - Turn and reposition patient  - Assist with mobility/ambulation  - Relieve pressure over bony prominences  - Avoid friction and shearing  - Provide appropriate hygiene as needed including keeping skin clean and dry  - Evaluate need for skin moisturizer/barrier cream  - Collaborate with interdisciplinary team   - Patient/family teaching  - Consider wound care consult   Outcome: Progressing

## 2024-07-19 NOTE — DISCHARGE SUMMARY
[facility]  Discharge- Lona Cedeno 1945, 78 y.o. female MRN: [id number]  Unit/Bed#: MS [facility]-01 Encounter: [id number]  Primary Care Provider: Vivek Preston DO   Date and time admitted to hospital: 7/16/2024  5:47 PM    * Sepsis due to pneumonia (HCC)  Assessment & Plan  Patient initially met sepsis criteria in that she was febrile with a Tmax of 100.6, tachycardic with a heart rate as high as 102, tachypneic with a respiratory rate as high as 30, leukocytosis of 19.83 and a known source of infection being right lower lobe pneumonia  All sepsis parameters have resolved-patient is no longer febrile, tachycardic, tachypneic, and her white count has come down to 9.28  Testing for COVID-19, RSV, and influenza is negative  Urine testing for both Streptococcus pneumonia and Legionella antigen is negative  Blood cultures x 2 sets-no growth to date  S/P 3 days of IV abx - Vanc and Cefepime  DC home on 4 more days of Ceftin  Prescription provided for 4 more days of Ceftin 500mg po q12h  DC home on all other pre-admit meds at pre admit doses  OP follow up with PCP in 7-10 days    Acute respiratory failure with hypoxia (HCC)  Assessment & Plan  Resolved - patient is now on room air  Secondary to the pneumonia on the right lower lobe as outlined above  OK for Dc Home    Open wound of right foot  Assessment & Plan  Patient has been following as an outpatient with wound care  Status post a podiatry evaluation  No further inpatient testing, treatment, and/or workup  OP follow up with Dr. Spaulding/wound care clinic    Acute metabolic encephalopathy  Assessment & Plan  Resolved  Secondary to sepsis  Ok for dc home    Essential hypertension  Assessment & Plan  Blood pressure stable   Continue amiloride and lisinopril post discharge    Type 2 diabetes mellitus with diabetic polyneuropathy (HCC)  Assessment & Plan  Lab Results   Component Value Date    HGBA1C 7.6 (H) 06/08/2024       Recent Labs      07/18/24  1609 07/18/24 2052 07/19/24  0727 07/19/24  1117   POCGLU 108 232* 161* 104         Blood Sugar Average: Last 72 hrs:  (P) 150.7    Dc home on pre admit DM meds at the same doses    COPD, severity to be determined (HCC)  Assessment & Plan  Without exacerbation   continue prehospital Proventil 90 mcg 2 puffs every 6 hours and Advair as the patient was taking prior to arrival    GERD without esophagitis  Assessment & Plan  Continue prehospital Protonix 40 mg p.o. daily post discharge    Anxiety and depression  Assessment & Plan  Continue prehospital Wellbutrin  mg p.o. daily post discharge    Anemia  Assessment & Plan  Hemoglobin is stable  Mild drop is probably secondary to dilutional effect IV fluid that the patient received  Continue prehospital vitamin B12 1000 mcg p.o. daily  Will need continued periodic outp cbc monitoring via her PCP    Hyperkalemia  Assessment & Plan  Resolved    Tobacco use disorder  Assessment & Plan  Cessation counseling provided  S/p treatment with a nicotine patch while in house    PAD (peripheral artery disease) (HCC)  Assessment & Plan  Continue aspirin, Lipitor, and Xarelto post discharge as the patient was taking prior to arrival          Medical Problems       Resolved Problems  Date Reviewed: 7/16/2024   None       Discharging Physician / Practitioner: Claudine Pretty MD  PCP: Vivek Preston DO  Admission Date:   Admission Orders (From admission, onward)       Ordered        07/16/24 1950  INPATIENT ADMISSION  Once                          Discharge Date: 07/19/24    Consultations During Hospital Stay:  Podiatry    Procedures Performed:   None    Significant Findings / Test Results:   X-Ray rt ankle - no acute osseous abnormality  X-ray Chest - Mild opacity in the right base, question atelectasis versus pneumonia. Chronic increase in interstitial lung markings.     Incidental Findings:   None    Test Results Pending at Discharge (will require follow up):  "  None     Outpatient Tests Requested:  None    Complications:  None    Reason for Admission: Sepsis/Pneumonia    Hospital Course:   Lona Cedeno is a 78 y.o. female patient who originally presented to the hospital on 7/16/2024 due to fevers and SOB. Please refer to the initial H/P completed by Rigo Garcia PA-C for the initial presenting features and complaints. In brief, the patient was admitted to the hospital for pneumonia which resulted in Sepsis. She was treated with 3 days of iv abx and then discharged home on 4 more days worth of Ceftin. She was seen by podiatry also for a non healing wound on her right foot. No inpt procedure was needed. Pt was deemed stable for dc on 7/19. Please refer to A/P portion of this dc summary as outlined above for remaining details.    Please see above list of diagnoses and related plan for additional information.     Condition at Discharge: good    Discharge Day Visit / Exam:   Subjective:  pt seen - looks and feels well - wants to go home  Vitals: Blood Pressure: (!) 174/60 (07/19/24 0729)  Pulse: 77 (07/19/24 0729)  Temperature: 97.9 °F (36.6 °C) (07/19/24 0729)  Temp Source: Oral (07/18/24 2120)  Respirations: 19 (07/19/24 0729)  Height: 5' 1\" (154.9 cm) (07/18/24 1312)  Weight - Scale: 76.2 kg (167 lb 15.9 oz) (07/18/24 1312)  SpO2: 91 % (07/19/24 0729)  Exam:   Physical Exam  Constitutional:       General: She is not in acute distress.     Appearance: Normal appearance. She is normal weight. She is not ill-appearing.   HENT:      Head: Normocephalic and atraumatic.      Nose: Nose normal.      Mouth/Throat:      Mouth: Mucous membranes are moist.   Eyes:      Extraocular Movements: Extraocular movements intact.      Pupils: Pupils are equal, round, and reactive to light.   Cardiovascular:      Rate and Rhythm: Normal rate and regular rhythm.      Pulses: Normal pulses.      Heart sounds: Normal heart sounds. No murmur heard.     No friction rub. No gallop.   Pulmonary:     "  Effort: Pulmonary effort is normal. No respiratory distress.      Breath sounds: Normal breath sounds. No wheezing, rhonchi or rales.   Abdominal:      General: There is no distension.      Palpations: Abdomen is soft. There is no mass.      Tenderness: There is no abdominal tenderness.      Hernia: No hernia is present.   Musculoskeletal:         General: No swelling or tenderness. Normal range of motion.      Cervical back: Normal range of motion and neck supple. No rigidity.      Right lower leg: No edema.      Left lower leg: No edema.   Skin:     General: Skin is warm.      Capillary Refill: Capillary refill takes less than 2 seconds.      Findings: No erythema or rash.   Neurological:      General: No focal deficit present.      Mental Status: She is alert and oriented to person, place, and time. Mental status is at baseline.      Cranial Nerves: No cranial nerve deficit.      Motor: No weakness.   Psychiatric:         Mood and Affect: Mood normal.         Behavior: Behavior normal.          Discussion with Family: Patient declined call to .     Discharge instructions/Information to patient and family:   See after visit summary for information provided to patient and family.      Provisions for Follow-Up Care:  See after visit summary for information related to follow-up care and any pertinent home health orders.      Mobility at time of Discharge:   Basic Mobility Inpatient Raw Score: 19  JH-HLM Goal: 6: Walk 10 steps or more  JH-HLM Achieved: 7: Walk 25 feet or more  HLM Goal achieved. Continue to encourage appropriate mobility.     Disposition:   Home    Planned Readmission: None     Discharge Statement:  I spent 45 minutes discharging the patient. This time was spent on the day of discharge. I had direct contact with the patient on the day of discharge. Greater than 50% of the total time was spent examining patient, answering all patient questions, arranging and discussing plan of care with  patient as well as directly providing post-discharge instructions.  Additional time then spent on discharge activities.    Discharge Medications:  See after visit summary for reconciled discharge medications provided to patient and/or family.      **Please Note: This note may have been constructed using a voice recognition system**

## 2024-07-22 ENCOUNTER — TRANSITIONAL CARE MANAGEMENT (OUTPATIENT)
Dept: FAMILY MEDICINE CLINIC | Facility: CLINIC | Age: 79
End: 2024-07-22

## 2024-07-22 LAB
BACTERIA BLD CULT: NORMAL
BACTERIA BLD CULT: NORMAL

## 2024-07-23 DIAGNOSIS — E11.42 TYPE 2 DIABETES MELLITUS WITH DIABETIC POLYNEUROPATHY, WITHOUT LONG-TERM CURRENT USE OF INSULIN (HCC): Primary | Chronic | ICD-10-CM

## 2024-07-23 RX ORDER — SITAGLIPTIN 100 MG/1
100 TABLET, FILM COATED ORAL DAILY
Qty: 90 TABLET | Refills: 2 | Status: SHIPPED | OUTPATIENT
Start: 2024-07-23

## 2024-07-23 NOTE — UTILIZATION REVIEW
NOTIFICATION OF ADMISSION DISCHARGE   This is a Notification of Discharge from [facility]. Please be advised that this patient has been discharge from our facility. Below you will find the admission and discharge date and time including the patient’s disposition.   UTILIZATION REVIEW CONTACT:  Jonathan Demarco  Utilization   Network Utilization Review Department  Phone: [phone] x carefully listen to the prompts. All voicemails are confidential.  Email: [email or web address]     ADMISSION INFORMATION  PRESENTATION DATE: 7/16/2024  5:47 PM  OBERVATION ADMISSION DATE: N/A  INPATIENT ADMISSION DATE: 7/16/24  7:50 PM   DISCHARGE DATE: 7/19/2024  5:06 PM   DISPOSITION:Home/Self Care    Network Utilization Review Department  ATTENTION: Please call with any questions or concerns to [phone] and carefully listen to the prompts so that you are directed to the right person. All voicemails are confidential.   For Discharge needs, contact Care Management DC Support Team at [phone] opt. 2  Send all requests for admission clinical reviews, approved or denied determinations and any other requests to dedicated fax number below belonging to the campus where the patient is receiving treatment. List of dedicated fax numbers for the Facilities:  FACILITY NAME UR FAX NUMBER   ADMISSION DENIALS (Administrative/Medical Necessity) [phone]   DISCHARGE SUPPORT TEAM ([facility]) [phone]   PARENT CHILD HEALTH (Maternity/NICU/Pediatrics) [phone]   [facility] [phone]   General acute hospital [phone]   [facility] [phone]   [facility] [phone]   [facility] [phone]   [facility] [phone]   [facility] [phone]   [facility] [phone]    [facility] [phone]   [facility] [phone]   [facility] [phone]   [facility] [phone]

## 2024-07-23 NOTE — TELEPHONE ENCOUNTER
Please Advise.   I tried to call patient to set up a tcm appointment but her daughter brings her to the appointments so she needs to wait for her .    I'm not seeing this on this on the patients medication list. I even looked on the previous medications that she was on and I didn't see it there either

## 2024-07-23 NOTE — TELEPHONE ENCOUNTER
Please Advise.   I'm not seeing this on this on the patients medication list. I even looked on the previous medications that she was on and I didn't see it there either.

## 2024-07-24 ENCOUNTER — TELEMEDICINE (OUTPATIENT)
Dept: FAMILY MEDICINE CLINIC | Facility: CLINIC | Age: 79
End: 2024-07-24
Payer: COMMERCIAL

## 2024-07-24 DIAGNOSIS — F17.200 TOBACCO USE DISORDER: Chronic | ICD-10-CM

## 2024-07-24 DIAGNOSIS — G92.9 TOXIC ENCEPHALOPATHY: ICD-10-CM

## 2024-07-24 DIAGNOSIS — I10 ESSENTIAL HYPERTENSION: Chronic | ICD-10-CM

## 2024-07-24 DIAGNOSIS — E11.42 TYPE 2 DIABETES MELLITUS WITH DIABETIC POLYNEUROPATHY, WITHOUT LONG-TERM CURRENT USE OF INSULIN (HCC): Chronic | ICD-10-CM

## 2024-07-24 DIAGNOSIS — A41.9 SEPSIS DUE TO PNEUMONIA (HCC): Primary | ICD-10-CM

## 2024-07-24 DIAGNOSIS — J18.9 SEPSIS DUE TO PNEUMONIA (HCC): Primary | ICD-10-CM

## 2024-07-24 PROCEDURE — 99496 TRANSJ CARE MGMT HIGH F2F 7D: CPT | Performed by: FAMILY MEDICINE

## 2024-07-24 NOTE — ASSESSMENT & PLAN NOTE
Toxic encephalopathy secondary to pneumonia hypoxemia and sepsis.  Hospitalized in treated with IV antibiotics and aggressive pulmonary therapy and she has improved at this time she is using an incentive spirometer at home and continuing on her inhalers.  Monitor blood glucose carefully and accurately and she will follow-up with me as scheduled at her appointment here in October

## 2024-07-24 NOTE — ASSESSMENT & PLAN NOTE
Lab Results   Component Value Date    HGBA1C 7.6 (H) 06/08/2024   Continue diabetic diet plan continue with monitoring blood sugars every day and heart healthy diet avoid concentrated sweets work on weight reduction

## 2024-07-24 NOTE — PROGRESS NOTES
Virtual TCM Visit:    Verification of patient location:    Patient is located at Home in the following state in which I hold an active license [place]    Assessment & Plan   1. Sepsis due to pneumonia ([facility])  Assessment & Plan:  Sepsis secondary to bilateral pneumonia with hypoxemia and near respiratory failure posthospitalization after patient was sent down from this office when she presented with confusion and hypoxemia.  She is doing much better now on virtual examination she is alert oriented x 3 back to her baseline mental status she is off oxygen altogether breathing well on room air and she quit smoking she will continue with her normal vitamins and medications and follow-up with me as scheduled in October and I informed her to call me or contact me come in if any problems over the next month if any changes.  2. Essential hypertension  Assessment & Plan:  Hypertension under stable control continue with amlodipine 5 mg daily.  Continue lisinopril and reevaluate at next office visit  3. Type 2 diabetes mellitus with diabetic polyneuropathy, without long-term current use of insulin ([facility])  Assessment & Plan:    Lab Results   Component Value Date    HGBA1C 7.6 (H) 06/08/2024   Continue diabetic diet plan continue with monitoring blood sugars every day and heart healthy diet avoid concentrated sweets work on weight reduction  4. Toxic encephalopathy  Assessment & Plan:  Toxic encephalopathy secondary to pneumonia hypoxemia and sepsis.  Hospitalized in treated with IV antibiotics and aggressive pulmonary therapy and she has improved at this time she is using an incentive spirometer at home and continuing on her inhalers.  Monitor blood glucose carefully and accurately and she will follow-up with me as scheduled at her appointment here in October  5. Tobacco use disorder  Assessment & Plan:  Patient states that she quit smoking now and will follow-up with me at her next visit encouraged her to remain off cigarettes as she  just went through respiratory failure         Encounter provider Vivek Preston DO     Provider located at [facility]  [address]  [place] [id number] [phone]    After connecting through televideo, the patient was identified by name and date of birth. Lona Cedeno was informed that this is a telemedicine visit and that the visit is being conducted through the ITema platform. She agrees to proceed..  My office door was closed. No one else was in the room.  She acknowledged consent and understanding of privacy and security of the video platform. The patient has agreed to participate and understands they can discontinue the visit at any time.    Patient is aware this is a billable service.    History of Present Illness     Transitional Care Management Review:   Lona Cedeno is a 78 y.o. female here for TCM follow up.    During the TCM phone call patient stated:  TCM Call       Date and time call was made  7/22/2024 11:05 AM    Hospital care reviewed  Records reviewed    Patient was hospitialized at  [facility]    Date of Admission  07/16/24    Date of discharge  07/19/24    Diagnosis  Sepsis due to pneumonia    Disposition  Home    Were the patients medications reviewed and updated  No    Current Symptoms  None          TCM Call       Post hospital issues  None    Should patient be enrolled in anticoag monitoring?  No    Scheduled for follow up?  Yes    Did you obtain your prescribed medications  Yes    Do you need help managing your prescriptions or medications  No    Is transportation to your appointment needed  No    I have advised the patient to call PCP with any new or worsening symptoms  ZULMA ADDISON    Living Arrangements  Family members          Transition of care management posthospitalization for bilateral pneumonia sepsis hypoxemia      Review of Systems   Constitutional:  Positive for fatigue. Negative for chills and fever.    HENT:  Negative for congestion, nosebleeds, rhinorrhea, sinus pressure and sore throat.    Eyes:  Negative for discharge and redness.   Respiratory:  Negative for cough and shortness of breath.    Cardiovascular:  Negative for chest pain, palpitations and leg swelling.   Gastrointestinal:  Negative for abdominal pain, blood in stool and nausea.   Endocrine: Negative for cold intolerance, heat intolerance and polyuria.   Genitourinary:  Negative for dysuria and frequency.   Musculoskeletal:  Negative for arthralgias, back pain and myalgias.   Skin:  Negative for rash.   Neurological:  Negative for dizziness, weakness and headaches.   Hematological:  Negative for adenopathy.   Psychiatric/Behavioral:  Negative for behavioral problems and sleep disturbance. The patient is not nervous/anxious.      Objective     There were no vitals taken for this visit.    Physical Exam  Vitals and nursing note reviewed.   Constitutional:       General: She is not in acute distress.     Appearance: Normal appearance. She is well-developed.   HENT:      Head: Normocephalic and atraumatic.      Right Ear: External ear normal.      Left Ear: Tympanic membrane and external ear normal.      Nose: Nose normal.      Mouth/Throat:      Mouth: Mucous membranes are moist.      Pharynx: Oropharynx is clear. No oropharyngeal exudate.   Eyes:      General: No scleral icterus.        Right eye: No discharge.         Left eye: No discharge.      Conjunctiva/sclera: Conjunctivae normal.      Pupils: Pupils are equal, round, and reactive to light.   Neck:      Thyroid: No thyromegaly.      Vascular: No JVD.   Cardiovascular:      Rate and Rhythm: Normal rate and regular rhythm.      Pulses: Normal pulses.      Heart sounds: Normal heart sounds. No murmur heard.  Pulmonary:      Effort: Pulmonary effort is normal.      Breath sounds: No wheezing or rales.   Chest:      Chest wall: No tenderness.   Abdominal:      General: Bowel sounds are normal.  There is no distension.      Palpations: Abdomen is soft. There is no mass.      Tenderness: There is no abdominal tenderness.   Musculoskeletal:         General: No tenderness or deformity. Normal range of motion.      Cervical back: Normal range of motion.   Lymphadenopathy:      Cervical: No cervical adenopathy.   Skin:     General: Skin is warm and dry.      Capillary Refill: Capillary refill takes less than 2 seconds.      Findings: No rash.   Neurological:      General: No focal deficit present.      Mental Status: She is alert and oriented to person, place, and time. Mental status is at baseline.      Cranial Nerves: No cranial nerve deficit.      Motor: Weakness present.      Coordination: Coordination normal.      Deep Tendon Reflexes: Reflexes are normal and symmetric. Reflexes normal.   Psychiatric:         Mood and Affect: Mood normal.         Behavior: Behavior normal.         Thought Content: Thought content normal.         Judgment: Judgment normal.       Medications have been reviewed by provider in current encounter      Vivek Preston, DO      VIRTUAL VISIT DISCLAIMER    Lona Cedeno verbally agrees to participate in Virtual Care Services. Pt is aware that Virtual Care Services could be limited without vital signs or the ability to perform a full hands-on physical exam. Lona Cedeno understands she or the provider may request at any time to terminate the video visit and request the patient to seek care or treatment in person.

## 2024-07-24 NOTE — ASSESSMENT & PLAN NOTE
Hypertension under stable control continue with amlodipine 5 mg daily.  Continue lisinopril and reevaluate at next office visit

## 2024-07-24 NOTE — PROGRESS NOTES
Transition of Care Visit  Name: Lona Cedeno      : 1945      MRN: [id number]  Encounter Provider: Vivek Preston DO  Encounter Date: 2024   Encounter department: [facility]    Assessment & Plan   {There are no diagnoses linked to this encounter. (Refresh or delete this SmartLink)}       History of Present Illness   {Disappearing Hyperlinks I Encounters * My Last Note * Since Last Visit * History :67097}  Transitional Care Management Review:   Lona Cedeno is a 78 y.o. female here for TCM follow up.     During the TCM phone call patient stated:  TCM Call       Date and time call was made  2024 11:05 AM    Hospital care reviewed  Records reviewed    Patient was hospitialized at  [facility]    Date of Admission  24    Date of discharge  24    Diagnosis  Sepsis due to pneumonia    Disposition  Home    Were the patients medications reviewed and updated  No    Current Symptoms  None          TCM Call       Post hospital issues  None    Should patient be enrolled in anticoag monitoring?  No    Scheduled for follow up?  Yes    Did you obtain your prescribed medications  Yes    Do you need help managing your prescriptions or medications  No    Is transportation to your appointment needed  No    I have advised the patient to call PCP with any new or worsening symptoms  ZULMA ADDISON    Living Arrangements  Family members          HPI  Review of Systems  Objective   {Disappearing Hyperlinks   Review Vitals * Enter New Vitals * Results Review * Labs * Imaging * Cardiology * Procedures * Lung Cancer Screening :65656}  There were no vitals taken for this visit.    Physical Exam  { Medications have NOT been reviewed by provider in current encounter. Once medications have been reviewed, please refresh your progress note so that you can sign the visit }    Administrative Statements {Disappearing Hyperlinks I  Level of Service * PCMH/PCSP:07749}  {Time Spent  Statement (Optional):03738}

## 2024-07-24 NOTE — ASSESSMENT & PLAN NOTE
Sepsis secondary to bilateral pneumonia with hypoxemia and near respiratory failure posthospitalization after patient was sent down from this office when she presented with confusion and hypoxemia.  She is doing much better now on virtual examination she is alert oriented x 3 back to her baseline mental status she is off oxygen altogether breathing well on room air and she quit smoking she will continue with her normal vitamins and medications and follow-up with me as scheduled in October and I informed her to call me or contact me come in if any problems over the next month if any changes.

## 2024-07-24 NOTE — ASSESSMENT & PLAN NOTE
Patient states that she quit smoking now and will follow-up with me at her next visit encouraged her to remain off cigarettes as she just went through respiratory failure

## 2024-08-02 DIAGNOSIS — F32.A ANXIETY AND DEPRESSION: ICD-10-CM

## 2024-08-02 DIAGNOSIS — F41.9 ANXIETY AND DEPRESSION: ICD-10-CM

## 2024-08-02 RX ORDER — CLONAZEPAM 1 MG/1
1 TABLET ORAL EVERY EVENING
Qty: 30 TABLET | Refills: 3 | Status: SHIPPED | OUTPATIENT
Start: 2024-08-02

## 2024-08-16 PROBLEM — J18.9 SEPSIS DUE TO PNEUMONIA (HCC): Status: RESOLVED | Noted: 2024-07-17 | Resolved: 2024-08-16

## 2024-08-16 PROBLEM — A41.9 SEPSIS DUE TO PNEUMONIA (HCC): Status: RESOLVED | Noted: 2024-07-17 | Resolved: 2024-08-16

## 2024-08-19 ENCOUNTER — HOSPITAL ENCOUNTER (OUTPATIENT)
Dept: NON INVASIVE DIAGNOSTICS | Facility: HOSPITAL | Age: 79
Discharge: HOME | End: 2024-08-19
Payer: COMMERCIAL

## 2024-08-19 DIAGNOSIS — I73.9 PAD (PERIPHERAL ARTERY DISEASE) (HCC): Chronic | ICD-10-CM

## 2024-08-19 PROCEDURE — 93923 UPR/LXTR ART STDY 3+ LVLS: CPT

## 2024-08-19 PROCEDURE — 93925 LOWER EXTREMITY STUDY: CPT | Performed by: SURGERY

## 2024-08-19 PROCEDURE — 93922 UPR/L XTREMITY ART 2 LEVELS: CPT | Performed by: SURGERY

## 2024-08-19 PROCEDURE — 93925 LOWER EXTREMITY STUDY: CPT

## 2024-08-23 DIAGNOSIS — I10 ESSENTIAL HYPERTENSION: Chronic | ICD-10-CM

## 2024-08-23 RX ORDER — LISINOPRIL 5 MG/1
5 TABLET ORAL DAILY
Qty: 90 TABLET | Refills: 1 | Status: SHIPPED | OUTPATIENT
Start: 2024-08-23

## 2024-08-25 DIAGNOSIS — E11.42 TYPE 2 DIABETES MELLITUS WITH DIABETIC POLYNEUROPATHY, WITHOUT LONG-TERM CURRENT USE OF INSULIN (HCC): Chronic | ICD-10-CM

## 2024-08-27 RX ORDER — PREGABALIN 200 MG/1
CAPSULE ORAL
Qty: 90 CAPSULE | Refills: 0 | Status: SHIPPED | OUTPATIENT
Start: 2024-08-27

## 2024-09-08 ENCOUNTER — TELEPHONE (OUTPATIENT)
Dept: VASCULAR SURGERY | Facility: CLINIC | Age: 79
End: 2024-09-08

## 2024-09-08 NOTE — TELEPHONE ENCOUNTER
Maria Isabel DONE 8/19/24 Please call patient to schedule     ----- Message from ROXAEN Arellano sent at 8/22/2024  3:26 PM EDT -----  Please schedule patient for follow-up with Dr. Hoyos to review results

## 2024-09-15 DIAGNOSIS — F32.A ANXIETY AND DEPRESSION: Primary | ICD-10-CM

## 2024-09-15 DIAGNOSIS — F41.9 ANXIETY AND DEPRESSION: Primary | ICD-10-CM

## 2024-09-17 RX ORDER — BUPROPION HYDROCHLORIDE 300 MG/1
300 TABLET ORAL DAILY
Qty: 90 TABLET | Refills: 0 | Status: SHIPPED | OUTPATIENT
Start: 2024-09-17

## 2024-09-17 RX ORDER — POTASSIUM CHLORIDE 750 MG/1
20 TABLET, EXTENDED RELEASE ORAL DAILY
Qty: 180 TABLET | Refills: 1 | Status: SHIPPED | OUTPATIENT
Start: 2024-09-17

## 2024-09-20 NOTE — ARC ADMISSION
OUTPATIENT THERAPY REQUESTING VERBAL ORDER TO CONTINUE TO SEE PATIENT ONCE (1) A WEEK FOR 4 WEEKS.    PLEASE CALL: [phone]    THANK YOU    Patient is medically cleared for discharge and is approved for admission to [facility] today. Patient will admit to [facility] room 963  and has a transport time of 130pm. Nurse can TT the [facility] charge nurse a number to call for report.  CM has been updated with same in [place].

## 2024-09-30 DIAGNOSIS — F32.A ANXIETY AND DEPRESSION: ICD-10-CM

## 2024-09-30 DIAGNOSIS — F41.9 ANXIETY AND DEPRESSION: ICD-10-CM

## 2024-09-30 DIAGNOSIS — E11.42 TYPE 2 DIABETES MELLITUS WITH DIABETIC POLYNEUROPATHY, WITHOUT LONG-TERM CURRENT USE OF INSULIN (HCC): Chronic | ICD-10-CM

## 2024-10-01 RX ORDER — CLONAZEPAM 1 MG/1
1 TABLET ORAL EVERY EVENING
Qty: 30 TABLET | Refills: 0 | Status: SHIPPED | OUTPATIENT
Start: 2024-10-01

## 2024-10-01 RX ORDER — PREGABALIN 200 MG/1
CAPSULE ORAL
Qty: 90 CAPSULE | Refills: 0 | Status: SHIPPED | OUTPATIENT
Start: 2024-10-01

## 2024-10-11 NOTE — ASSESSMENT & PLAN NOTE
----- Message from Maral FLORES sent at 10/11/2024 12:48 PM CDT -----  Regarding: STAT headache referral  Good afternoon:    Please review and advise STAT referral placed by Irwin Castellano MD,[facility] on 10-10-24.  The patient has a Dx of Nonintractable headache, unspecified chronicity pattern, unspecified headache  type    The patient has seen a neurologist previously. No  Is this a new problem? Yes  Onset date 10-10-24    The patient has imaging. Yes  MRI BRAIN WO CONTRAST (Final result)   IMPRESSION:     #  No acute intracranial abnormality.  #  Age-appropriate generalized cerebral volume loss, small chronic  infarction in the right basal ganglia and probable chronic microvascular  ischemic changes.  CTA HEAD AND NECK LEVEL 1 (Final result)   IMPRESSION:     CTA NECK:    No hemodynamically significant extracranial stenosis, occlusion or  dissection.     CTA HEAD:    #  Moderate to severe multifocal intracranial narrowing as described above,  similar to prior CTA performed May 31, 2024. Please see above for details.  #  No new area of stenosis or large vessel occlusion.  #  No intracranial aneurysm identified.  CT HEAD LEVEL 1 (Final result)   IMPRESSION:  1. No intracranial hemorrhage.  2. Hypoattenuation in the right basal ganglia may reflect remote lacunar  infarcts or focal chronic small vessel disease. MRI follow-up if there is  concern for occult ischemia.  Narrative:  EXAM: CT HEAD LEVEL 1  INDICATION: stroke.  COMPARISON: May 31, 2024  Possible that symptoms could be related to migraine syndrome however consideration of giant cell arteritis was brought up to due to left-sided headache.  It was felt less likely to be infectious condition such as meningitis or encephalitis as patient has no fever not altered or disoriented symptoms came on rather suddenly.  Also unlikely acute bleed or subarachnoid bleed as patient had CT imaging that way and imaging is negative for any acute bleed.  Neurology  Patient initially met sepsis criteria in that she was febrile with a Tmax of 100.6, tachycardic with a heart rate as high as 102, tachypneic with a respiratory rate as high as 30, leukocytosis of 19.83 and a known source of infection being right lower lobe pneumonia  All sepsis parameters have resolved-patient is no longer febrile, tachycardic, tachypneic, and her white count has come down to 11.69  Testing for COVID-19, RSV, and influenza is negative  Urine testing for both Streptococcus pneumonia and Legionella antigen is negative  Blood cultures x 2 sets-no growth to date  Okay to discontinue IV fluid  Status post treatment with 2 days worth of IV cefepime, and vancomycin  Okay to discontinue vancomycin  Continue monotherapy with IV cefepime  If the patient continues to get better, hopeful discharge planning in 24 to 48 hours   agreeable with plan for discharge home as sed rate is normal       Please advise if pt needs to be seen STAT or if next available is appropriate      Thank you for your time and consideration,  Beronica   Clinical Referral Specialist I  Neuroscience Navigation - [facility]

## 2024-10-14 DIAGNOSIS — I73.9 PAD (PERIPHERAL ARTERY DISEASE) (HCC): ICD-10-CM

## 2024-10-14 RX ORDER — RIVAROXABAN 2.5 MG/1
2.5 TABLET, FILM COATED ORAL 2 TIMES DAILY
Qty: 180 TABLET | Refills: 0 | Status: ON HOLD | OUTPATIENT
Start: 2024-10-14

## 2024-10-16 DIAGNOSIS — E11.42 TYPE 2 DIABETES MELLITUS WITH DIABETIC POLYNEUROPATHY, WITHOUT LONG-TERM CURRENT USE OF INSULIN (HCC): Chronic | ICD-10-CM

## 2024-10-16 RX ORDER — SITAGLIPTIN 100 MG/1
100 TABLET, FILM COATED ORAL DAILY
Qty: 90 TABLET | Refills: 3 | Status: ON HOLD | OUTPATIENT
Start: 2024-10-16

## 2024-10-21 ENCOUNTER — RA CDI HCC (OUTPATIENT)
Dept: OTHER | Facility: HOSPITAL | Age: 79
End: 2024-10-21

## 2024-10-21 ENCOUNTER — TELEPHONE (OUTPATIENT)
Age: 79
End: 2024-10-21

## 2024-10-21 DIAGNOSIS — Z12.31 SCREENING MAMMOGRAM FOR BREAST CANCER: Primary | ICD-10-CM

## 2024-10-21 DIAGNOSIS — E11.42 TYPE 2 DIABETES MELLITUS WITH DIABETIC POLYNEUROPATHY, WITHOUT LONG-TERM CURRENT USE OF INSULIN (HCC): Chronic | ICD-10-CM

## 2024-10-21 NOTE — TELEPHONE ENCOUNTER
Pt stated schedule Mammo for tomorrow can doctor order mammo and fax to [facility] [phone]

## 2024-10-22 NOTE — PROGRESS NOTES
HCC coding opportunities          Chart Reviewed number of suggestions sent to Provider: 2  E11.65, E11.51     Patients Insurance     Medicare Insurance: [employer] Advantage

## 2024-10-23 ENCOUNTER — APPOINTMENT (EMERGENCY)
Dept: CT IMAGING | Facility: HOSPITAL | Age: 79
DRG: 871 | End: 2024-10-23
Payer: COMMERCIAL

## 2024-10-23 ENCOUNTER — HOSPITAL ENCOUNTER (INPATIENT)
Facility: HOSPITAL | Age: 79
LOS: 5 days | Discharge: HOME | DRG: 871 | End: 2024-10-28
Attending: FAMILY MEDICINE | Admitting: INTERNAL MEDICINE
Payer: COMMERCIAL

## 2024-10-23 DIAGNOSIS — A41.9 SEPSIS (HCC): ICD-10-CM

## 2024-10-23 DIAGNOSIS — J44.9 COPD, SEVERITY TO BE DETERMINED (HCC): Chronic | ICD-10-CM

## 2024-10-23 DIAGNOSIS — J18.9 PNEUMONIA: ICD-10-CM

## 2024-10-23 DIAGNOSIS — R51.9 HEADACHE: ICD-10-CM

## 2024-10-23 DIAGNOSIS — R09.02 HYPOXIA: Primary | ICD-10-CM

## 2024-10-23 LAB
ALBUMIN SERPL BCG-MCNC: 4.2 G/DL (ref 3.5–5)
ALP SERPL-CCNC: 57 U/L (ref 34–104)
ALT SERPL W P-5'-P-CCNC: 8 U/L (ref 7–52)
ANION GAP SERPL CALCULATED.3IONS-SCNC: 7 MMOL/L (ref 4–13)
APTT PPP: 32 SECONDS (ref 23–34)
AST SERPL W P-5'-P-CCNC: 18 U/L (ref 13–39)
BASOPHILS # BLD AUTO: 0.04 THOUSANDS/ΜL (ref 0–0.1)
BASOPHILS NFR BLD AUTO: 0 % (ref 0–1)
BILIRUB SERPL-MCNC: 0.38 MG/DL (ref 0.2–1)
BILIRUB UR QL STRIP: NEGATIVE
BUN SERPL-MCNC: 12 MG/DL (ref 5–25)
CALCIUM SERPL-MCNC: 10.4 MG/DL (ref 8.4–10.2)
CHLORIDE SERPL-SCNC: 97 MMOL/L (ref 96–108)
CLARITY UR: CLEAR
CO2 SERPL-SCNC: 28 MMOL/L (ref 21–32)
COLOR UR: YELLOW
CREAT SERPL-MCNC: 0.73 MG/DL (ref 0.6–1.3)
EOSINOPHIL # BLD AUTO: 0.01 THOUSAND/ΜL (ref 0–0.61)
EOSINOPHIL NFR BLD AUTO: 0 % (ref 0–6)
ERYTHROCYTE [DISTWIDTH] IN BLOOD BY AUTOMATED COUNT: 15.2 % (ref 11.6–15.1)
FLUAV RNA RESP QL NAA+PROBE: NEGATIVE
FLUBV RNA RESP QL NAA+PROBE: NEGATIVE
GFR SERPL CREATININE-BSD FRML MDRD: 79 ML/MIN/1.73SQ M
GLUCOSE SERPL-MCNC: 141 MG/DL (ref 65–140)
GLUCOSE UR STRIP-MCNC: NEGATIVE MG/DL
HCT VFR BLD AUTO: 44.1 % (ref 34.8–46.1)
HGB BLD-MCNC: 13.9 G/DL (ref 11.5–15.4)
HGB UR QL STRIP.AUTO: NEGATIVE
IMM GRANULOCYTES # BLD AUTO: 0.08 THOUSAND/UL (ref 0–0.2)
IMM GRANULOCYTES NFR BLD AUTO: 0 % (ref 0–2)
INR PPP: 1.11 (ref 0.85–1.19)
KETONES UR STRIP-MCNC: NEGATIVE MG/DL
LACTATE SERPL-SCNC: 2.1 MMOL/L (ref 0.5–2)
LACTATE SERPL-SCNC: 2.6 MMOL/L (ref 0.5–2)
LEUKOCYTE ESTERASE UR QL STRIP: NEGATIVE
LYMPHOCYTES # BLD AUTO: 1.04 THOUSANDS/ΜL (ref 0.6–4.47)
LYMPHOCYTES NFR BLD AUTO: 5 % (ref 14–44)
MCH RBC QN AUTO: 29.2 PG (ref 26.8–34.3)
MCHC RBC AUTO-ENTMCNC: 31.5 G/DL (ref 31.4–37.4)
MCV RBC AUTO: 93 FL (ref 82–98)
MONOCYTES # BLD AUTO: 1.07 THOUSAND/ΜL (ref 0.17–1.22)
MONOCYTES NFR BLD AUTO: 5 % (ref 4–12)
NEUTROPHILS # BLD AUTO: 17.51 THOUSANDS/ΜL (ref 1.85–7.62)
NEUTS SEG NFR BLD AUTO: 90 % (ref 43–75)
NITRITE UR QL STRIP: NEGATIVE
NRBC BLD AUTO-RTO: 0 /100 WBCS
PH UR STRIP.AUTO: 7.5 [PH]
PLATELET # BLD AUTO: 245 THOUSANDS/UL (ref 149–390)
PMV BLD AUTO: 10.1 FL (ref 8.9–12.7)
POTASSIUM SERPL-SCNC: 5.2 MMOL/L (ref 3.5–5.3)
PROCALCITONIN SERPL-MCNC: 1.29 NG/ML
PROT SERPL-MCNC: 6.7 G/DL (ref 6.4–8.4)
PROT UR STRIP-MCNC: NEGATIVE MG/DL
PROTHROMBIN TIME: 14.8 SECONDS (ref 12.3–15)
RBC # BLD AUTO: 4.76 MILLION/UL (ref 3.81–5.12)
RSV RNA RESP QL NAA+PROBE: NEGATIVE
SARS-COV-2 RNA RESP QL NAA+PROBE: NEGATIVE
SODIUM SERPL-SCNC: 132 MMOL/L (ref 135–147)
SP GR UR STRIP.AUTO: 1.01
UROBILINOGEN UR QL STRIP.AUTO: 0.2 E.U./DL
WBC # BLD AUTO: 19.75 THOUSAND/UL (ref 4.31–10.16)

## 2024-10-23 PROCEDURE — 80053 COMPREHEN METABOLIC PANEL: CPT | Performed by: FAMILY MEDICINE

## 2024-10-23 PROCEDURE — 94760 N-INVAS EAR/PLS OXIMETRY 1: CPT

## 2024-10-23 PROCEDURE — 36415 COLL VENOUS BLD VENIPUNCTURE: CPT | Performed by: FAMILY MEDICINE

## 2024-10-23 PROCEDURE — 70450 CT HEAD/BRAIN W/O DYE: CPT

## 2024-10-23 PROCEDURE — 96365 THER/PROPH/DIAG IV INF INIT: CPT

## 2024-10-23 PROCEDURE — 99285 EMERGENCY DEPT VISIT HI MDM: CPT

## 2024-10-23 PROCEDURE — 83605 ASSAY OF LACTIC ACID: CPT | Performed by: FAMILY MEDICINE

## 2024-10-23 PROCEDURE — 81003 URINALYSIS AUTO W/O SCOPE: CPT | Performed by: FAMILY MEDICINE

## 2024-10-23 PROCEDURE — 71260 CT THORAX DX C+: CPT

## 2024-10-23 PROCEDURE — 0241U HB NFCT DS VIR RESP RNA 4 TRGT: CPT | Performed by: FAMILY MEDICINE

## 2024-10-23 PROCEDURE — 85025 COMPLETE CBC W/AUTO DIFF WBC: CPT | Performed by: FAMILY MEDICINE

## 2024-10-23 PROCEDURE — 94664 DEMO&/EVAL PT USE INHALER: CPT

## 2024-10-23 PROCEDURE — 85610 PROTHROMBIN TIME: CPT | Performed by: FAMILY MEDICINE

## 2024-10-23 PROCEDURE — 87077 CULTURE AEROBIC IDENTIFY: CPT | Performed by: FAMILY MEDICINE

## 2024-10-23 PROCEDURE — 99285 EMERGENCY DEPT VISIT HI MDM: CPT | Performed by: FAMILY MEDICINE

## 2024-10-23 PROCEDURE — 99223 1ST HOSP IP/OBS HIGH 75: CPT

## 2024-10-23 PROCEDURE — 87154 CUL TYP ID BLD PTHGN 6+ TRGT: CPT | Performed by: FAMILY MEDICINE

## 2024-10-23 PROCEDURE — 85730 THROMBOPLASTIN TIME PARTIAL: CPT | Performed by: FAMILY MEDICINE

## 2024-10-23 PROCEDURE — 87040 BLOOD CULTURE FOR BACTERIA: CPT | Performed by: FAMILY MEDICINE

## 2024-10-23 PROCEDURE — 84145 PROCALCITONIN (PCT): CPT | Performed by: FAMILY MEDICINE

## 2024-10-23 RX ORDER — ACETAMINOPHEN 325 MG/1
975 TABLET ORAL EVERY 8 HOURS SCHEDULED
Status: DISCONTINUED | OUTPATIENT
Start: 2024-10-23 | End: 2024-10-28 | Stop reason: HOSPADM

## 2024-10-23 RX ORDER — CEFEPIME HYDROCHLORIDE 2 G/50ML
2000 INJECTION, SOLUTION INTRAVENOUS ONCE
Status: COMPLETED | OUTPATIENT
Start: 2024-10-23 | End: 2024-10-23

## 2024-10-23 RX ORDER — ATORVASTATIN CALCIUM 10 MG/1
10 TABLET, FILM COATED ORAL
Status: DISCONTINUED | OUTPATIENT
Start: 2024-10-24 | End: 2024-10-28 | Stop reason: HOSPADM

## 2024-10-23 RX ORDER — SODIUM CHLORIDE, SODIUM GLUCONATE, SODIUM ACETATE, POTASSIUM CHLORIDE, MAGNESIUM CHLORIDE, SODIUM PHOSPHATE, DIBASIC, AND POTASSIUM PHOSPHATE .53; .5; .37; .037; .03; .012; .00082 G/100ML; G/100ML; G/100ML; G/100ML; G/100ML; G/100ML; G/100ML
75 INJECTION, SOLUTION INTRAVENOUS CONTINUOUS
Status: DISCONTINUED | OUTPATIENT
Start: 2024-10-23 | End: 2024-10-24

## 2024-10-23 RX ORDER — CEFTRIAXONE 1 G/50ML
1000 INJECTION, SOLUTION INTRAVENOUS EVERY 24 HOURS
Status: DISCONTINUED | OUTPATIENT
Start: 2024-10-23 | End: 2024-10-28 | Stop reason: HOSPADM

## 2024-10-23 RX ORDER — PREGABALIN 100 MG/1
200 CAPSULE ORAL DAILY
Status: DISCONTINUED | OUTPATIENT
Start: 2024-10-24 | End: 2024-10-28 | Stop reason: HOSPADM

## 2024-10-23 RX ORDER — ASPIRIN 81 MG/1
81 TABLET, CHEWABLE ORAL DAILY
Status: DISCONTINUED | OUTPATIENT
Start: 2024-10-24 | End: 2024-10-28 | Stop reason: HOSPADM

## 2024-10-23 RX ORDER — FLUTICASONE FUROATE AND VILANTEROL 200; 25 UG/1; UG/1
1 POWDER RESPIRATORY (INHALATION)
Status: DISCONTINUED | OUTPATIENT
Start: 2024-10-24 | End: 2024-10-28 | Stop reason: HOSPADM

## 2024-10-23 RX ORDER — POLYETHYLENE GLYCOL 3350 17 G/17G
17 POWDER, FOR SOLUTION ORAL DAILY
Status: DISCONTINUED | OUTPATIENT
Start: 2024-10-24 | End: 2024-10-28 | Stop reason: HOSPADM

## 2024-10-23 RX ORDER — CLONAZEPAM 1 MG/1
1 TABLET ORAL EVERY EVENING
Status: DISCONTINUED | OUTPATIENT
Start: 2024-10-23 | End: 2024-10-28 | Stop reason: HOSPADM

## 2024-10-23 RX ORDER — OXYBUTYNIN CHLORIDE 5 MG/1
5 TABLET, EXTENDED RELEASE ORAL DAILY
Status: DISCONTINUED | OUTPATIENT
Start: 2024-10-24 | End: 2024-10-28 | Stop reason: HOSPADM

## 2024-10-23 RX ORDER — PANTOPRAZOLE SODIUM 40 MG/1
40 TABLET, DELAYED RELEASE ORAL DAILY
Status: DISCONTINUED | OUTPATIENT
Start: 2024-10-24 | End: 2024-10-28 | Stop reason: HOSPADM

## 2024-10-23 RX ORDER — BUPROPION HYDROCHLORIDE 150 MG/1
300 TABLET ORAL DAILY
Status: DISCONTINUED | OUTPATIENT
Start: 2024-10-24 | End: 2024-10-28 | Stop reason: HOSPADM

## 2024-10-23 RX ORDER — POTASSIUM CHLORIDE 1500 MG/1
20 TABLET, EXTENDED RELEASE ORAL DAILY
Status: DISCONTINUED | OUTPATIENT
Start: 2024-10-24 | End: 2024-10-24

## 2024-10-23 RX ORDER — DOCUSATE SODIUM 100 MG/1
100 CAPSULE, LIQUID FILLED ORAL 2 TIMES DAILY
Status: DISCONTINUED | OUTPATIENT
Start: 2024-10-24 | End: 2024-10-28 | Stop reason: HOSPADM

## 2024-10-23 RX ORDER — LISINOPRIL 5 MG/1
5 TABLET ORAL DAILY
Status: DISCONTINUED | OUTPATIENT
Start: 2024-10-24 | End: 2024-10-28 | Stop reason: HOSPADM

## 2024-10-23 RX ORDER — AMILORIDE HYDROCHLORIDE 5 MG/1
5 TABLET ORAL DAILY
Status: DISCONTINUED | OUTPATIENT
Start: 2024-10-24 | End: 2024-10-24

## 2024-10-23 RX ADMIN — CEFEPIME HYDROCHLORIDE 2000 MG: 2 INJECTION, SOLUTION INTRAVENOUS at 20:09

## 2024-10-23 RX ADMIN — SODIUM CHLORIDE 1000 ML: 0.9 INJECTION, SOLUTION INTRAVENOUS at 20:15

## 2024-10-23 RX ADMIN — IOHEXOL 85 ML: 350 INJECTION, SOLUTION INTRAVENOUS at 19:59

## 2024-10-23 NOTE — ED PROVIDER NOTES
Time reflects when diagnosis was documented in both MDM as applicable and the Disposition within this note       Time User Action Codes Description Comment    10/23/2024  8:35 PM Ahmad, Abimael Add [R09.02] Hypoxia     10/23/2024  8:35 PM Ahmad, Abimael Add [R51.9] Headache     10/23/2024  8:35 PM Ahmad, Abimael Add [A41.9] Sepsis (HCC)     10/23/2024  8:40 PM Ahmad, Abimael Add [J18.9] Pneumonia           ED Disposition       ED Disposition   Admit    Condition   Stable    Date/Time   Wed Oct 23, 2024  8:35 PM    Comment   Case was discussed with Shaq and the patient's admission status was agreed to be Admission Status: inpatient status to the service of Dr. Thomas .               Assessment & Plan       Medical Decision Making  Amount and/or Complexity of Data Reviewed  Labs: ordered.  Radiology: ordered.    Risk  Prescription drug management.    79-year-old female presented to ED with altered mental status and headache.  Patient that she headache at this afternoon took Tylenol which resolved her headache.  However daughter stated that that she is concerned that she was not making any sense.  Patient was found to be hypoxic in the ED oxygenation 88% placed on 2 L of cannula.  She is awake alert oriented GCS 15 does complain of some generalized weakness  History taken with patient and family at bedside  Differential diagnosis include pneumonia/intracranial bleed/metabolic encephalopathy/electro normality/UTI  Plan will obtain lab work patient started on antibiotics IV fluid placed on 2 L of cannula.  Concerning for worsening infection will admit  Family is updated regarding patient blood work and imaging  CT head reviewed within normal limits  Discussed with the internal medicine Shaq accepted the admission.        Medications   sodium chloride 0.9 % bolus 1,000 mL (1,000 mL Intravenous New Bag 10/23/24 2015)   cefepime (MAXIPIME) IVPB (premix in dextrose) 2,000 mg 50 mL (2,000 mg Intravenous New Bag 10/23/24 2009)    iohexol (OMNIPAQUE) 350 MG/ML injection (MULTI-DOSE) 85 mL (85 mL Intravenous Given 10/23/24 1959)       ED Risk Strat Scores                                               History of Present Illness       Chief Complaint   Patient presents with    Headache     Pt reports headache earlier today and took tylenol noting it went away. Pts daughter notes pt was not coherent and was not making sense since early afternoon       Past Medical History:   Diagnosis Date    Anxiety     Closed fracture of coccyx (HCC) 2023    COPD (chronic obstructive pulmonary disease) (HCC)     Diabetes (HCC)     Diabetes mellitus (HCC)     GERD (gastroesophageal reflux disease)     Hyperlipidemia     Hypertension     IBS (irritable bowel syndrome)     Peripheral arterial disease (HCC)     Shoulder fracture     Tobacco use       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;  Surgeon: Cristian Child MD;  Location: [facility];  Service: Urology    EVACUATION OF HEMATOMA Right 2024    Procedure: EVACUATION/ DRAINAGE CHEST WALL  HEMATOMA;  Surgeon: Seth Hoyos MD;  Location: [facility];  Service: Vascular    FL RETROGRADE PYELOGRAM  2023    HERNIA REPAIR      umbilicus w/ mesh    MT BYPASS W/VEIN AXILLARY-FEMORAL Right 2024    Procedure: BYPASS AXILLO-FEMORAL, RIGHT WITH 8MM RINGED PTFE GRAFT;  Surgeon: Seth Hoyos MD;  [facility];  Service: Vascular    SPLIT THICKNESS SKIN GRAFT Right 2024    Procedure: SKIN  GRAFT SPLIT THICKNESS (STSG)  EXTREMITY, Wound vac dressing change;  Surgeon: Rigo Yeboah DPM;  [facility];  Service: Podiatry    WOUND DEBRIDEMENT Right 2024    Procedure: RIGHT WOUND AND ACHILLES DEBRIDEMENT FOOT/TOE (WASH OUT); PARTIAL AMPUTATION DISTAL 2ND TOE;  Surgeon: Aaron Montero DPM;  [facility];  Service: Podiatry    WOUND DEBRIDEMENT Right 2024    Procedure: DEBRIDEMENT RIGHT GROIN  WOUND AND WASHOUT, APPLICATION OF WOUND VAC;  Surgeon: Suly Muro DO;  [facility];  Service: Vascular    WOUND DEBRIDEMENT Right 2024    Procedure: DEBRIDEMENT LOWER EXTREMITY, washout;  Surgeon: Suly Muro DO;  [facility];  Service: Vascular      Family History   Problem Relation Age of Onset    COPD Mother     Emphysema Mother     COPD Father     Emphysema Father       Social History     Tobacco Use    Smoking status: Former     Current packs/day: 0.00     Average packs/day: 1 pack/day for 63.5 years (63.1 ttl pk-yrs)     Types: Cigarettes     Start date:      Quit date: 2024     Years since quittin.3    Smokeless tobacco: Never    Tobacco comments:     Quit 2024   Vaping Use    Vaping status: Never Used   Substance Use Topics    Alcohol use: Not Currently    Drug use: Never      E-Cigarette/Vaping    E-Cigarette Use Never User       E-Cigarette/Vaping Substances    Nicotine No     THC No     CBD No     Flavoring No     Other No     Unknown No       I have reviewed and agree with the history as documented.     HPI  79-year-old female presented to ED with altered mental status and headache.  Patient that she headache at this afternoon took Tylenol which resolved her headache.  However daughter stated that that she is concerned that she was not making any sense.  Patient was found to be hypoxic in the ED oxygenation 88% placed on 2 L of cannula.  She is awake alert oriented GCS 15 does complain of some generalized  weakness  Review of Systems   Constitutional:  Negative for chills and fever.   HENT:  Negative for rhinorrhea and sore throat.    Eyes:  Negative for visual disturbance.   Respiratory:  Negative for cough and shortness of breath.    Cardiovascular:  Negative for chest pain and leg swelling.   Gastrointestinal:  Negative for abdominal pain, diarrhea, nausea and vomiting.   Genitourinary:  Negative for dysuria.   Musculoskeletal:  Negative for back pain and myalgias.   Skin:  Negative for rash.   Neurological:  Negative for dizziness and headaches.   Psychiatric/Behavioral:  Positive for confusion.    All other systems reviewed and are negative.          Objective       ED Triage Vitals   Temperature Pulse Blood Pressure Respirations SpO2 Patient Position - Orthostatic VS   10/23/24 1921 10/23/24 1919 10/23/24 1919 10/23/24 1919 10/23/24 1919 --   99.8 °F (37.7 °C) 94 127/55 18 (!) 85 %       Temp Source Heart Rate Source BP Location FiO2 (%) Pain Score    10/23/24 1921 -- -- -- 10/23/24 1919    Tympanic    No Pain      Vitals      Date and Time Temp Pulse SpO2 Resp BP Pain Score FACES Pain Rating User   10/23/24 2015 -- 86 92 % 16 112/52 -- -- KL   10/23/24 1930 -- 93 90 % -- 127/52 -- --    10/23/24 1921 99.8 °F (37.7 °C) -- -- -- -- -- -- MD   10/23/24 1919 -- 94 85 % 18 127/55 No Pain -- MD            Physical Exam  Vitals and nursing note reviewed.   Constitutional:       Appearance: She is well-developed.   HENT:      Head: Normocephalic and atraumatic.      Right Ear: External ear normal.      Left Ear: External ear normal.      Nose: Nose normal.      Mouth/Throat:      Mouth: Mucous membranes are moist.      Pharynx: No oropharyngeal exudate.   Eyes:      General: No scleral icterus.        Right eye: No discharge.         Left eye: No discharge.      Conjunctiva/sclera: Conjunctivae normal.      Pupils: Pupils are equal, round, and reactive to light.   Cardiovascular:      Rate and Rhythm: Normal rate and  regular rhythm.      Pulses: Normal pulses.      Heart sounds: Normal heart sounds.   Pulmonary:      Effort: Pulmonary effort is normal. No respiratory distress.      Breath sounds: Rhonchi present. No wheezing.   Abdominal:      General: Bowel sounds are normal.      Palpations: Abdomen is soft.   Musculoskeletal:         General: Normal range of motion.      Cervical back: Normal range of motion and neck supple.   Lymphadenopathy:      Cervical: No cervical adenopathy.   Skin:     General: Skin is warm and dry.      Capillary Refill: Capillary refill takes less than 2 seconds.   Neurological:      General: No focal deficit present.      Mental Status: She is alert and oriented to person, place, and time.   Psychiatric:         Mood and Affect: Mood normal.         Behavior: Behavior normal.         Results Reviewed       Procedure Component Value Units Date/Time    FLU/RSV/COVID - if FLU/RSV clinically relevant [[id number]]  (Normal) Collected: 10/23/24 1930    Lab Status: Final result Specimen: Nares from Nose Updated: 10/23/24 2021     SARS-CoV-2 Negative     INFLUENZA A PCR Negative     INFLUENZA B PCR Negative     RSV PCR Negative    Narrative:      This test has been performed using the CoV-2/Flu/RSV plus assay on the [employer] GeneXpert platform. This test has been validated by the  and verified by the performing laboratory.     This test is designed to amplify and detect the following: nucleocapsid (N), envelope (E), and RNA-dependent RNA polymerase (RdRP) genes of the SARS-CoV-2 genome; matrix (M), basic polymerase (PB2), and acidic protein (PA) segments of the influenza A genome; matrix (M) and non-structural protein (NS) segments of the influenza B genome, and the nucleocapsid genes of RSV A and RSV B.     Positive results are indicative of the presence of Flu A, Flu B, RSV, and/or SARS-CoV-2 RNA. Positive results for SARS-CoV-2 or suspected novel influenza should be reported to state, local,  or [facility] health departments according to local reporting requirements.      All results should be assessed in conjunction with clinical presentation and other laboratory markers for clinical management.     FOR PEDIATRIC PATIENTS - copy/paste COVID Guidelines URL to browser: [email or web address]       Blood culture #2 [[id number]] Collected: 10/23/24 2009    Lab Status: In process Specimen: Blood from Arm, Left Updated: 10/23/24 2019    APTT [[id number]]  (Normal) Collected: 10/23/24 1930    Lab Status: Final result Specimen: Blood from Arm, Right Updated: 10/23/24 2010     PTT 32 seconds     Protime-INR [[id number]]  (Normal) Collected: 10/23/24 1930    Lab Status: Final result Specimen: Blood from Arm, Right Updated: 10/23/24 2010     Protime 14.8 seconds      INR 1.11    Narrative:      INR Therapeutic Range    Indication                                             INR Range      Atrial Fibrillation                                               2.0-3.0  Hypercoagulable State                                    2.0.2.3  Left Ventricular Asist Device                            2.0-3.0  Mechanical Heart Valve                                  -    Aortic(with afib, MI, embolism, HF, LA enlargement,    and/or coagulopathy)                                     2.0-3.0 (2.5-3.5)     Mitral                                                             2.5-3.5  Prosthetic/Bioprosthetic Heart Valve               2.0-3.0  Venous thromboembolism (VTE: VT, PE        2.0-3.0    Procalcitonin [[id number]]  (Abnormal) Collected: 10/23/24 1930    Lab Status: Final result Specimen: Blood from Arm, Right Updated: 10/23/24 2004     Procalcitonin 1.29 ng/ml     Comprehensive metabolic panel [[id number]]  (Abnormal) Collected: 10/23/24 1930    Lab Status: Final result Specimen: Blood from Arm, Right Updated: 10/23/24 1957     Sodium 132 mmol/L      Potassium 5.2 mmol/L      Chloride 97  mmol/L      CO2 28 mmol/L      ANION GAP 7 mmol/L      BUN 12 mg/dL      Creatinine 0.73 mg/dL      Glucose 141 mg/dL      Calcium 10.4 mg/dL      AST 18 U/L      ALT 8 U/L      Alkaline Phosphatase 57 U/L      Total Protein 6.7 g/dL      Albumin 4.2 g/dL      Total Bilirubin 0.38 mg/dL      eGFR 79 ml/min/1.73sq m     Narrative:      National Kidney Disease Foundation guidelines for Chronic Kidney Disease (CKD):     Stage 1 with normal or high GFR (GFR > 90 mL/min/1.73 square meters)    Stage 2 Mild CKD (GFR = 60-89 mL/min/1.73 square meters)    Stage 3A Moderate CKD (GFR = 45-59 mL/min/1.73 square meters)    Stage 3B Moderate CKD (GFR = 30-44 mL/min/1.73 square meters)    Stage 4 Severe CKD (GFR = 15-29 mL/min/1.73 square meters)    Stage 5 End Stage CKD (GFR <15 mL/min/1.73 square meters)  Note: GFR calculation is accurate only with a steady state creatinine    Lactic acid [[id number]]  (Abnormal) Collected: 10/23/24 1930    Lab Status: Final result Specimen: Blood from Arm, Right Updated: 10/23/24 1954     LACTIC ACID 2.6 mmol/L     Narrative:      Result may be elevated if tourniquet was used during collection.    Lactic acid 2 Hours [[id number]]     Lab Status: No result Specimen: Blood     CBC and differential [[id number]]  (Abnormal) Collected: 10/23/24 1930    Lab Status: Final result Specimen: Blood from Arm, Right Updated: 10/23/24 1947     WBC 19.75 Thousand/uL      RBC 4.76 Million/uL      Hemoglobin 13.9 g/dL      Hematocrit 44.1 %      MCV 93 fL      MCH 29.2 pg      MCHC 31.5 g/dL      RDW 15.2 %      MPV 10.1 fL      Platelets 245 Thousands/uL      nRBC 0 /100 WBCs      Segmented % 90 %      Immature Grans % 0 %      Lymphocytes % 5 %      Monocytes % 5 %      Eosinophils Relative 0 %      Basophils Relative 0 %      Absolute Neutrophils 17.51 Thousands/µL      Absolute Immature Grans 0.08 Thousand/uL      Absolute Lymphocytes 1.04 Thousands/µL      Absolute Monocytes 1.07 Thousand/µL      Eosinophils  Absolute 0.01 Thousand/µL      Basophils Absolute 0.04 Thousands/µL     Narrative:      This is an appended report.  These results have been appended to a previously verified report.    Blood culture #1 [[id number]] Collected: 10/23/24 1930    Lab Status: In process Specimen: Blood from Arm, Right Updated: 10/23/24 1936    UA w Reflex to Microscopic w Reflex to Culture [[id number]]     Lab Status: No result Specimen: Urine             CT chest with contrast   Final Interpretation by Rona Marte MD (10/23 2038)      Right lower lobe consolidation and additional tree-in-bud and groundglass airspace opacities throughout the right lung suspicious for pneumonia.      Chronic seroma underlying the right pectoralis major muscle at the proximal axillary bypass graft anastomosis               Workstation performed: [id number]         CT head wo contrast   Final Interpretation by Santos Pace MD (10/23 2020)      No mass effect, acute intracranial hemorrhage or evidence of recent infarction.      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 Correlate with direct visual inspection.                  Workstation performed: [id number]             Procedures    ED Medication and Procedure Management   Prior to Admission Medications   Prescriptions Last Dose Informant Patient Reported? Taking?   AMILoride 5 mg tablet   Yes No   Apoaequorin 20 MG CAPS  Self No No   Sig: Take 20 mg by mouth in the morning   Aspirin (ASPIR-81 PO)  Self Yes No   Sig: take one by mouth daily   Blood Glucose Monitoring Suppl (OneTouch Verio) w/Device KIT  Self No No   Sig: Use 2 (two) times a day   Fluticasone-Salmeterol (Advair Diskus) 250-50 mcg/dose inhaler   No No   Sig: Inhale 1 puff 2 (two) times a day Rinse mouth after use.   Januvia 100 MG tablet   No No   Sig: Take 1 tablet (100 mg total) by mouth daily   Lancets (OneTouch Delica Plus [id number]) MISC  Self No No   Sig: TEST TWO TIMES A DAY   Misc. Devices ([employer]yx  Cushion) MISC  Self No No   Sig: Use in the morning   OneTouch Verio test strip   Yes No   Xarelto 2.5 MG tablet   No No   Sig: TAKE ONE TABLET BY MOUTH TWICE A DAY   albuterol (Ventolin HFA) 90 mcg/act inhaler   No No   Sig: Inhale 2 puffs every 6 (six) hours as needed for wheezing   ammonium lactate (LAC-HYDRIN) 12 % lotion   No No   Sig: Apply topically daily To dry scaling skin   atorvastatin (LIPITOR) 10 mg tablet   No No   Sig: Take 1 tablet (10 mg total) by mouth daily with dinner   buPROPion (WELLBUTRIN XL) 300 mg 24 hr tablet   No No   Sig: TAKE ONE TABLET BY MOUTH EVERY DAY   clonazePAM (KlonoPIN) 1 mg tablet   No No   Sig: TAKE ONE TABLET BY MOUTH IN THE EVENING   cyanocobalamin (VITAMIN B-12) 1000 MCG tablet   No No   Sig: Take 1 tablet (1,000 mcg total) by mouth daily   lisinopril (ZESTRIL) 5 mg tablet   No No   Sig: TAKE ONE TABLET BY MOUTH EVERY DAY   metFORMIN (GLUCOPHAGE) 1000 MG tablet   No No   Sig: TAKE ONE TABLET BY MOUTH TWICE A DAY WITH MEALS   mupirocin (BACTROBAN) 2 % ointment   No No   Sig: Apply topically daily for 7 days To open wounds of RLE   Patient not taking: Reported on 7/24/2024   naloxone (NARCAN) 4 mg/0.1 mL nasal spray  Self No No   Sig: Administer 1 spray into a nostril. If no response after 2-3 minutes, give another dose in the other nostril using a new spray.   pantoprazole (PROTONIX) 40 mg tablet   No No   Sig: Take 1 tablet (40 mg total) by mouth daily   polyethylene glycol (MIRALAX) 17 g packet  Self No No   Sig: Take 17 g by mouth daily   potassium chloride (Klor-Con M10) 10 mEq tablet   No No   Sig: TAKE TWO TABLETS BY MOUTH EVERY DAY   pregabalin (LYRICA) 200 MG capsule   No No   Sig: TAKE ONE CAPSULE BY MOUTH THREE TIMES A DAY   solifenacin (VESICARE) 5 mg tablet   No No   Sig: Take 1 tablet (5 mg total) by mouth daily      Facility-Administered Medications: None     Patient's Medications   Discharge Prescriptions    No medications on file     No discharge procedures  on file.  ED SEPSIS DOCUMENTATION   Time reflects when diagnosis was documented in both MDM as applicable and the Disposition within this note       Time User Action Codes Description Comment    10/23/2024  8:35 PM Abimael Sandy Add [R09.02] Hypoxia     10/23/2024  8:35 PM Du Abimael Add [R51.9] Headache     10/23/2024  8:35 PM Ryan Sandyt Add [A41.9] Sepsis (HCC)     10/23/2024  8:40 PM Abimael Sandy Add [J18.9] Pneumonia                  Abimael Sandy MD  10/23/24 2046

## 2024-10-24 ENCOUNTER — RA CDI HCC (OUTPATIENT)
Dept: RADIOLOGY | Facility: HOSPITAL | Age: 79
End: 2024-10-24

## 2024-10-24 PROBLEM — E87.1 HYPONATREMIA: Status: ACTIVE | Noted: 2024-10-24

## 2024-10-24 PROBLEM — R65.20 SEPSIS WITH ACUTE RESPIRATORY FAILURE (HCC): Status: ACTIVE | Noted: 2024-02-13

## 2024-10-24 PROBLEM — J96.00 SEPSIS WITH ACUTE RESPIRATORY FAILURE (HCC): Status: ACTIVE | Noted: 2024-02-13

## 2024-10-24 LAB
ANION GAP SERPL CALCULATED.3IONS-SCNC: 6 MMOL/L (ref 4–13)
ANISOCYTOSIS BLD QL SMEAR: PRESENT
BASOPHILS # BLD MANUAL: 0 THOUSAND/UL (ref 0–0.1)
BASOPHILS NFR MAR MANUAL: 0 % (ref 0–1)
BUN SERPL-MCNC: 11 MG/DL (ref 5–25)
CALCIUM SERPL-MCNC: 9.8 MG/DL (ref 8.4–10.2)
CHLORIDE SERPL-SCNC: 102 MMOL/L (ref 96–108)
CO2 SERPL-SCNC: 29 MMOL/L (ref 21–32)
CREAT SERPL-MCNC: 0.65 MG/DL (ref 0.6–1.3)
DOHLE BOD BLD QL SMEAR: PRESENT
EOSINOPHIL # BLD MANUAL: 0.31 THOUSAND/UL (ref 0–0.4)
EOSINOPHIL NFR BLD MANUAL: 2 % (ref 0–6)
ERYTHROCYTE [DISTWIDTH] IN BLOOD BY AUTOMATED COUNT: 15.5 % (ref 11.6–15.1)
EST. AVERAGE GLUCOSE BLD GHB EST-MCNC: 166 MG/DL
GFR SERPL CREATININE-BSD FRML MDRD: 85 ML/MIN/1.73SQ M
GIANT PLATELETS BLD QL SMEAR: PRESENT
GLUCOSE SERPL-MCNC: 117 MG/DL (ref 65–140)
GLUCOSE SERPL-MCNC: 129 MG/DL (ref 65–140)
GLUCOSE SERPL-MCNC: 144 MG/DL (ref 65–140)
HBA1C MFR BLD: 7.4 %
HCT VFR BLD AUTO: 40.1 % (ref 34.8–46.1)
HGB BLD-MCNC: 12.4 G/DL (ref 11.5–15.4)
LYMPHOCYTES # BLD AUTO: 0.46 THOUSAND/UL (ref 0.6–4.47)
LYMPHOCYTES # BLD AUTO: 3 % (ref 14–44)
MAGNESIUM SERPL-MCNC: 1.5 MG/DL (ref 1.9–2.7)
MCH RBC QN AUTO: 29.2 PG (ref 26.8–34.3)
MCHC RBC AUTO-ENTMCNC: 30.9 G/DL (ref 31.4–37.4)
MCV RBC AUTO: 95 FL (ref 82–98)
MONOCYTES # BLD AUTO: 0.77 THOUSAND/UL (ref 0–1.22)
MONOCYTES NFR BLD: 5 % (ref 4–12)
NEUTROPHILS # BLD MANUAL: 13.91 THOUSAND/UL (ref 1.85–7.62)
NEUTS BAND NFR BLD MANUAL: 14 % (ref 0–8)
NEUTS SEG NFR BLD AUTO: 76 % (ref 43–75)
PHOSPHATE SERPL-MCNC: 4.2 MG/DL (ref 2.3–4.1)
PLATELET # BLD AUTO: 200 THOUSANDS/UL (ref 149–390)
PLATELET BLD QL SMEAR: ADEQUATE
PMV BLD AUTO: 10.8 FL (ref 8.9–12.7)
POLYCHROMASIA BLD QL SMEAR: PRESENT
POTASSIUM SERPL-SCNC: 4.2 MMOL/L (ref 3.5–5.3)
PROCALCITONIN SERPL-MCNC: 1.26 NG/ML
RBC # BLD AUTO: 4.24 MILLION/UL (ref 3.81–5.12)
RBC MORPH BLD: PRESENT
SODIUM SERPL-SCNC: 137 MMOL/L (ref 135–147)
WBC # BLD AUTO: 15.46 THOUSAND/UL (ref 4.31–10.16)

## 2024-10-24 PROCEDURE — 84100 ASSAY OF PHOSPHORUS: CPT

## 2024-10-24 PROCEDURE — 85007 BL SMEAR W/DIFF WBC COUNT: CPT

## 2024-10-24 PROCEDURE — 94664 DEMO&/EVAL PT USE INHALER: CPT

## 2024-10-24 PROCEDURE — 82948 REAGENT STRIP/BLOOD GLUCOSE: CPT

## 2024-10-24 PROCEDURE — 80048 BASIC METABOLIC PNL TOTAL CA: CPT

## 2024-10-24 PROCEDURE — 94760 N-INVAS EAR/PLS OXIMETRY 1: CPT

## 2024-10-24 PROCEDURE — 84145 PROCALCITONIN (PCT): CPT

## 2024-10-24 PROCEDURE — 85027 COMPLETE CBC AUTOMATED: CPT

## 2024-10-24 PROCEDURE — G0008 ADMIN INFLUENZA VIRUS VAC: HCPCS | Performed by: INTERNAL MEDICINE

## 2024-10-24 PROCEDURE — 99232 SBSQ HOSP IP/OBS MODERATE 35: CPT | Performed by: HOSPITALIST

## 2024-10-24 PROCEDURE — 83036 HEMOGLOBIN GLYCOSYLATED A1C: CPT | Performed by: HOSPITALIST

## 2024-10-24 PROCEDURE — 87070 CULTURE OTHR SPECIMN AEROBIC: CPT

## 2024-10-24 PROCEDURE — 90662 IIV NO PRSV INCREASED AG IM: CPT | Performed by: INTERNAL MEDICINE

## 2024-10-24 PROCEDURE — 87205 SMEAR GRAM STAIN: CPT

## 2024-10-24 PROCEDURE — 83735 ASSAY OF MAGNESIUM: CPT

## 2024-10-24 PROCEDURE — 87449 NOS EACH ORGANISM AG IA: CPT | Performed by: HOSPITALIST

## 2024-10-24 RX ORDER — ACETAMINOPHEN 325 MG/1
975 TABLET ORAL EVERY 6 HOURS PRN
COMMUNITY

## 2024-10-24 RX ORDER — ALBUTEROL SULFATE 90 UG/1
2 INHALANT RESPIRATORY (INHALATION) EVERY 4 HOURS PRN
Status: DISCONTINUED | OUTPATIENT
Start: 2024-10-24 | End: 2024-10-24

## 2024-10-24 RX ORDER — INSULIN LISPRO 100 [IU]/ML
1-6 INJECTION, SOLUTION INTRAVENOUS; SUBCUTANEOUS
Status: DISCONTINUED | OUTPATIENT
Start: 2024-10-24 | End: 2024-10-28 | Stop reason: HOSPADM

## 2024-10-24 RX ORDER — AMILORIDE HYDROCHLORIDE 5 MG/1
5 TABLET ORAL DAILY
Status: DISCONTINUED | OUTPATIENT
Start: 2024-10-24 | End: 2024-10-28 | Stop reason: HOSPADM

## 2024-10-24 RX ORDER — MAGNESIUM SULFATE HEPTAHYDRATE 40 MG/ML
2 INJECTION, SOLUTION INTRAVENOUS ONCE
Status: COMPLETED | OUTPATIENT
Start: 2024-10-24 | End: 2024-10-24

## 2024-10-24 RX ORDER — ALBUTEROL SULFATE 90 UG/1
2 INHALANT RESPIRATORY (INHALATION)
Status: DISCONTINUED | OUTPATIENT
Start: 2024-10-24 | End: 2024-10-28 | Stop reason: HOSPADM

## 2024-10-24 RX ORDER — IPRATROPIUM BROMIDE AND ALBUTEROL SULFATE 2.5; .5 MG/3ML; MG/3ML
3 SOLUTION RESPIRATORY (INHALATION) EVERY 6 HOURS PRN
Status: DISCONTINUED | OUTPATIENT
Start: 2024-10-24 | End: 2024-10-24

## 2024-10-24 RX ADMIN — CLONAZEPAM 1 MG: 1 TABLET ORAL at 17:25

## 2024-10-24 RX ADMIN — SODIUM CHLORIDE, SODIUM GLUCONATE, SODIUM ACETATE, POTASSIUM CHLORIDE, MAGNESIUM CHLORIDE, SODIUM PHOSPHATE, DIBASIC, AND POTASSIUM PHOSPHATE 75 ML/HR: .53; .5; .37; .037; .03; .012; .00082 INJECTION, SOLUTION INTRAVENOUS at 00:05

## 2024-10-24 RX ADMIN — ACETAMINOPHEN 975 MG: 325 TABLET ORAL at 21:34

## 2024-10-24 RX ADMIN — ACETAMINOPHEN 975 MG: 325 TABLET ORAL at 00:01

## 2024-10-24 RX ADMIN — BUPROPION HYDROCHLORIDE 300 MG: 150 TABLET, EXTENDED RELEASE ORAL at 09:16

## 2024-10-24 RX ADMIN — LISINOPRIL 5 MG: 5 TABLET ORAL at 09:16

## 2024-10-24 RX ADMIN — PANTOPRAZOLE SODIUM 40 MG: 40 TABLET, DELAYED RELEASE ORAL at 09:16

## 2024-10-24 RX ADMIN — DOCUSATE SODIUM 100 MG: 100 CAPSULE, LIQUID FILLED ORAL at 09:15

## 2024-10-24 RX ADMIN — CLONAZEPAM 1 MG: 1 TABLET ORAL at 00:01

## 2024-10-24 RX ADMIN — ATORVASTATIN CALCIUM 10 MG: 10 TABLET, FILM COATED ORAL at 17:25

## 2024-10-24 RX ADMIN — CEFTRIAXONE 1000 MG: 1 INJECTION, SOLUTION INTRAVENOUS at 23:22

## 2024-10-24 RX ADMIN — CEFTRIAXONE 1000 MG: 1 INJECTION, SOLUTION INTRAVENOUS at 00:03

## 2024-10-24 RX ADMIN — POLYETHYLENE GLYCOL 3350 17 G: 17 POWDER, FOR SOLUTION ORAL at 09:15

## 2024-10-24 RX ADMIN — ACETAMINOPHEN 975 MG: 325 TABLET ORAL at 14:55

## 2024-10-24 RX ADMIN — ASPIRIN 81 MG 81 MG: 81 TABLET ORAL at 09:16

## 2024-10-24 RX ADMIN — RIVAROXABAN 2.5 MG: 2.5 TABLET, FILM COATED ORAL at 17:26

## 2024-10-24 RX ADMIN — ACETAMINOPHEN 975 MG: 325 TABLET ORAL at 06:32

## 2024-10-24 RX ADMIN — PREGABALIN 200 MG: 100 CAPSULE ORAL at 09:16

## 2024-10-24 RX ADMIN — RIVAROXABAN 2.5 MG: 2.5 TABLET, FILM COATED ORAL at 09:20

## 2024-10-24 RX ADMIN — MAGNESIUM SULFATE HEPTAHYDRATE 2 G: 40 INJECTION, SOLUTION INTRAVENOUS at 06:32

## 2024-10-24 RX ADMIN — FLUTICASONE FUROATE AND VILANTEROL TRIFENATATE 1 PUFF: 200; 25 POWDER RESPIRATORY (INHALATION) at 09:30

## 2024-10-24 RX ADMIN — AMILORIDE HYDROCLORIDE 5 MG: 5 TABLET ORAL at 09:15

## 2024-10-24 RX ADMIN — AZITHROMYCIN MONOHYDRATE 500 MG: 500 INJECTION, POWDER, LYOPHILIZED, FOR SOLUTION INTRAVENOUS at 01:48

## 2024-10-24 RX ADMIN — INFLUENZA A VIRUS A/VICTORIA/4897/2022 IVR-238 (H1N1) ANTIGEN (FORMALDEHYDE INACTIVATED), INFLUENZA A VIRUS A/CALIFORNIA/122/2022 SAN-022 (H3N2) ANTIGEN (FORMALDEHYDE INACTIVATED), AND INFLUENZA B VIRUS B/MICHIGAN/01/2021 ANTIGEN (FORMALDEHYDE INACTIVATED) 0.5 ML: 60; 60; 60 INJECTION, SUSPENSION INTRAMUSCULAR at 09:32

## 2024-10-24 RX ADMIN — DOCUSATE SODIUM 100 MG: 100 CAPSULE, LIQUID FILLED ORAL at 17:25

## 2024-10-24 RX ADMIN — ALBUTEROL SULFATE 2 PUFF: 90 AEROSOL, METERED RESPIRATORY (INHALATION) at 09:30

## 2024-10-24 RX ADMIN — OXYBUTYNIN CHLORIDE 5 MG: 5 TABLET, EXTENDED RELEASE ORAL at 09:16

## 2024-10-24 RX ADMIN — ALBUTEROL SULFATE 2 PUFF: 90 AEROSOL, METERED RESPIRATORY (INHALATION) at 20:05

## 2024-10-24 NOTE — PROGRESS NOTES
"Progress Note - Hospitalist   Name: Lona Cedeno 79 y.o. female I MRN: [id number]  Unit/Bed#: -01 I Date of Admission: 10/23/2024   Date of Service: 10/24/2024 I Hospital Day: 1    Assessment & Plan  Sepsis with acute respiratory failure (HCC)  Sepsis was present on admission -patient had a white blood cell count of 19.75, heart rate of 94, and a known infection of the right lower lobe pneumonia  Sepsis parameters have improved  White blood cell count is down to 15.46, and the patient is no longer tachycardic  See the management as outlined below  Acute toxic metabolic encephalopathy-patient demonstrated some confusion at time of arrival, currently it has resolved.  This occurred in the setting of sepsis and hypoxia.  Okay to DC IV fluid  Community acquired pneumonia of right lower lobe of lung  Continue Rocephin, and Zithromax day 2  Blood cultures x 2 sets are in progress  Testing for COVID-19, RSV, and influenza is negative  Will order urine testing for both  Streptococcus antigen and Legionella antigen  Repeat blood work in the a.m.  Acute respiratory failure with hypoxia (HCC)  Arrival O2 sat was 85% on room air  Patient's current oxygen requirement is 4 L of supplemental oxygen via nasal cannula with a resultant O2 sat in the low to mid 90s  Secondary to the pneumonia as outlined above  Wean oxygen as able  Type 2 diabetes mellitus with diabetic polyneuropathy (HCC)  Lab Results   Component Value Date    HGBA1C 7.6 (H) 06/08/2024       No results for input(s): \"POCGLU\" in the last 72 hours.    Blood Sugar Average: Last 72 hrs:    Oral hypoglycemic medications are on hold  Target blood sugar for the hospital is 140-180  Implement Accu-Cheks before meals and at bedtime and according sliding scale coverage  Essential hypertension  Blood pressure stable   Continue amiloride and lisinopril   Anxiety and depression  Continue prehospital Wellbutrin  mg p.o.   Continue clonazepam 1 mg in the evening  Mood " currently stable  GERD without esophagitis  Continue Protonix 40 mg p.o. daily   PAD (peripheral artery disease) (HCC)  Continue aspirin, Lipitor, and Xarelto   COPD, severity to be determined (HCC)  Currently without exacerbation  Continue treatment in accordance with respiratory protocol  Of note, patient continues to smoke  Continue current pulmonary based medications which include the following-  #1.  Fluticasone-vilanterol 200-25 micrograms 1 puff inhalational daily  #2.  Albuterol inhaler 2 puffs as needed twice daily    Tobacco use disorder  Nicotine patch offered  Cessation recommended  Hyponatremia  Most likely a hypovolemic hyponatremia  And recheck BMP testing in the a.m.  Hypomagnesemia  Magnesium this morning (10/24) is 1.5  Repleted, will recheck in the a.m.    VTE Pharmacologic Prophylaxis: VTE Score: 7 High Risk (Score >/= 5) - Pharmacological DVT Prophylaxis Ordered: rivaroxaban (Xarelto). Sequential Compression Devices Ordered.    Mobility:   Basic Mobility Inpatient Raw Score: 20  JH-HLM Goal: 6: Walk 10 steps or more  JH-HLM Achieved: 4: Move to chair/commode  JH-HLM Goal achieved. Continue to encourage appropriate mobility.    Patient Centered Rounds: I performed bedside rounds with nursing staff today.   Discussions with Specialists or Other Care Team Provider: Case management    Education and Discussions with Family / Patient: Patient declined call to .     Current Length of Stay: 1 day(s)  Current Patient Status: Inpatient   Certification Statement: The patient will continue to require additional inpatient hospital stay due to need for IV antibiotics  Discharge Plan: Anticipate discharge in 48-72 hrs to home.    Code Status: Level 3 - DNAR and DNI    Subjective   Patient seen, sitting up on the side of the bed, reports feeling a lot better than prior to coming into the hospital, denies any pain or discomfort    Objective :  Temp:  [97.7 °F (36.5 °C)-99.8 °F (37.7 °C)] 97.7 °F  (36.5 °C)  HR:  [76-94] 80  BP: ()/(45-69) 98/60  Resp:  [16-20] 16  SpO2:  [80 %-94 %] 93 %  O2 Device: None (Room air)  Nasal Cannula O2 Flow Rate (L/min):  [3 L/min-4 L/min] 4 L/min    Body mass index is 32.91 kg/m².     Input and Output Summary (last 24 hours):     Intake/Output Summary (Last 24 hours) at 10/24/2024 1439  Last data filed at 10/24/2024 1200  Gross per 24 hour   Intake 1740 ml   Output 1000 ml   Net 740 ml       Physical Exam  Constitutional:       General: She is not in acute distress.     Appearance: Normal appearance. She is normal weight. She is not ill-appearing.   HENT:      Head: Normocephalic and atraumatic.      Nose: Nose normal.      Mouth/Throat:      Mouth: Mucous membranes are moist.   Eyes:      Extraocular Movements: Extraocular movements intact.      Pupils: Pupils are equal, round, and reactive to light.   Cardiovascular:      Rate and Rhythm: Normal rate and regular rhythm.      Pulses: Normal pulses.      Heart sounds: Normal heart sounds. No murmur heard.     No friction rub. No gallop.   Pulmonary:      Effort: Pulmonary effort is normal. No respiratory distress.      Breath sounds: Normal breath sounds. No wheezing, rhonchi or rales.   Abdominal:      General: There is no distension.      Palpations: Abdomen is soft. There is no mass.      Tenderness: There is no abdominal tenderness.      Hernia: No hernia is present.   Musculoskeletal:         General: No swelling or tenderness. Normal range of motion.      Cervical back: Normal range of motion and neck supple. No rigidity.      Right lower leg: No edema.      Left lower leg: No edema.   Skin:     General: Skin is warm.      Capillary Refill: Capillary refill takes less than 2 seconds.      Findings: No erythema or rash.   Neurological:      General: No focal deficit present.      Mental Status: She is alert and oriented to person, place, and time. Mental status is at baseline.      Cranial Nerves: No cranial nerve  deficit.      Motor: No weakness.   Psychiatric:         Mood and Affect: Mood normal.         Behavior: Behavior normal.         Lab Results: I have reviewed the following results:   Results from last 7 days   Lab Units 10/24/24  0440 10/23/24  1930   WBC Thousand/uL 15.46* 19.75*   HEMOGLOBIN g/dL 12.4 13.9   HEMATOCRIT % 40.1 44.1   PLATELETS Thousands/uL 200 245   BANDS PCT % 14*  --    SEGS PCT %  --  90*   LYMPHO PCT % 3* 5*   MONO PCT % 5 5   EOS PCT % 2 0     Results from last 7 days   Lab Units 10/24/24  0440 10/23/24  1930   SODIUM mmol/L 137 132*   POTASSIUM mmol/L 4.2 5.2   CHLORIDE mmol/L 102 97   CO2 mmol/L 29 28   BUN mg/dL 11 12   CREATININE mg/dL 0.65 0.73   ANION GAP mmol/L 6 7   CALCIUM mg/dL 9.8 10.4*   ALBUMIN g/dL  --  4.2   TOTAL BILIRUBIN mg/dL  --  0.38   ALK PHOS U/L  --  57   ALT U/L  --  8   AST U/L  --  18   GLUCOSE RANDOM mg/dL 117 141*     Results from last 7 days   Lab Units 10/23/24  1930   INR  1.11             Results from last 7 days   Lab Units 10/24/24  0440 10/23/24  2226 10/23/24  1930   LACTIC ACID mmol/L  --  2.1* 2.6*   PROCALCITONIN ng/ml 1.26*  --  1.29*       Recent Cultures (last 7 days):   Results from last 7 days   Lab Units 10/23/24  2009 10/23/24  1930   BLOOD CULTURE  Received in Microbiology Lab. Culture in Progress. Received in Microbiology Lab. Culture in Progress.       Imaging Results Review: No pertinent imaging studies reviewed.  Other Study Results Review: No additional pertinent studies reviewed.    Last 24 Hours Medication List:     Current Facility-Administered Medications:     acetaminophen (TYLENOL) tablet 975 mg, Q8H JILLIAN    albuterol (PROVENTIL HFA,VENTOLIN HFA) inhaler 2 puff, BID    AMILoride tablet 5 mg, Daily    aspirin chewable tablet 81 mg, Daily    atorvastatin (LIPITOR) tablet 10 mg, Daily With Dinner    azithromycin (ZITHROMAX) 500 mg in sodium chloride 0.9 % 250 mL IVPB, Q24H, Last Rate: 500 mg (10/24/24 0148)    buPROPion (WELLBUTRIN XL) 24  hr tablet 300 mg, Daily    cefTRIAXone (ROCEPHIN) IVPB (premix in dextrose) 1,000 mg 50 mL, Q24H, Last Rate: 1,000 mg (10/24/24 0003)    clonazePAM (KlonoPIN) tablet 1 mg, QPM    docusate sodium (COLACE) capsule 100 mg, BID    fluticasone-vilanterol 200-25 mcg/actuation 1 puff, Daily    insulin lispro (HumALOG/ADMELOG) 100 units/mL subcutaneous injection 1-6 Units, TID AC **AND** Fingerstick Glucose (POCT), TID AC    insulin lispro (HumALOG/ADMELOG) 100 units/mL subcutaneous injection 1-6 Units, HS    lisinopril (ZESTRIL) tablet 5 mg, Daily    multi-electrolyte (PLASMALYTE-A/ISOLYTE-S PH 7.4) IV solution, Continuous, Last Rate: 75 mL/hr (10/24/24 0333)    oxybutynin (DITROPAN-XL) 24 hr tablet 5 mg, Daily    pantoprazole (PROTONIX) EC tablet 40 mg, Daily    polyethylene glycol (MIRALAX) packet 17 g, Daily    pregabalin (LYRICA) capsule 200 mg, Daily    rivaroxaban (XARELTO) tablet 2.5 mg, BID    Administrative Statements   Today, Patient Was Seen By: Claudine Pretty MD      **Please Note: This note may have been constructed using a voice recognition system.**

## 2024-10-24 NOTE — ASSESSMENT & PLAN NOTE
Currently without exacerbation  Continue treatment in accordance with respiratory protocol  Of note, patient continues to smoke  Continue current pulmonary based medications which include the following-  #1.  Fluticasone-vilanterol 200-25 micrograms 1 puff inhalational daily  #2.  Albuterol inhaler 2 puffs as needed twice daily

## 2024-10-24 NOTE — ASSESSMENT & PLAN NOTE
Continue prehospital Wellbutrin  mg p.o.   Continue clonazepam 1 mg in the evening  Mood currently stable

## 2024-10-24 NOTE — CASE MANAGEMENT
Case Management Assessment & Discharge Planning Note    Patient name Lona Cedeno  Location /-01 MRN [id number]  : 1945 Date 10/24/2024       Current Admission Date: 10/23/2024  Current Admission Diagnosis:Sepsis with acute respiratory failure (HCC)   Patient Active Problem List    Diagnosis Date Noted Date Diagnosed    Hyponatremia 10/24/2024     Community acquired pneumonia of right lower lobe of lung 2024     Open wound of right foot 2024     Hyperkalemia 2024     Acute metabolic encephalopathy 2024     Toxic encephalopathy 2024     Acute respiratory failure with hypoxia (HCC) 2024     Sacral wound 2024     Other dietary vitamin B12 deficiency anemia 2024     Leukocytosis 2024     Sepsis with acute respiratory failure (HCC) 2024     At risk for venous thromboembolism (VTE) 2024     At high risk for skin breakdown 2024     Wound of skin 2024     Impaired mobility and activities of daily living 2024     Anemia 2024     Constipation 2024     Age-related osteoporosis without current pathological fracture 2023     Abnormal CT of the chest 10/17/2023     COPD, severity to be determined ([facility]) 10/17/2023     Tobacco use disorder 10/17/2023     PAD (peripheral artery disease) ([facility]) 10/10/2023     Bladder neoplasm 2023     Left renal mass 2023     Hypomagnesemia 2023     Degenerative lumbar disc 2023     Urinary incontinence 2023     GERD without esophagitis 10/16/2019     Essential hypertension 02/15/2019     Anxiety and depression 02/15/2019     Type 2 diabetes mellitus with diabetic polyneuropathy ([facility]) 2019       LOS (days): 1  Geometric Mean LOS (GMLOS) (days): 4.9  Days to GMLOS:4     OBJECTIVE:    Risk of Unplanned Readmission Score: 34.12         Current admission status: Inpatient  Referral Reason: Other (d/c planning)    Preferred Pharmacy:   [id number] - [place] - [address]  [address] [facility] [facility] [address]  Phone: [phone] Fax: [phone]    [facility] [place] - [address]  [address] [place] [address]  Phone: [phone] Fax: [phone]    [facility] - [place], [place] - [address]  [address]  [place] [address]  Phone: [phone] Fax: [phone]    Primary Care Provider: Vivek Preston DO    Primary Insurance: [facility] REP  Secondary Insurance:     ASSESSMENT:  Active Health Care Proxies       Carmelo Cedeno [facility] Care Representative - Spouse   Primary Phone: [phone] (Home)                 Advance Directives  Does patient have a Health Care POA?: Yes (family needs to bring inpaperwork)  Does patient have Advance Directives?: Yes (family needs to bring in paperwork)         Readmission Root Cause  30 Day Readmission: No    Patient Information  Admitted from:: Home  Mental Status: Alert  During Assessment patient was accompanied by: Not accompanied during assessment  Assessment information provided by:: Patient  Primary Caregiver: Self  Support Systems: Spouse/significant other, Daughter (pt cares for her disabled child)  County of Residence: [place]  What city do you live in?: [place]  Home entry access options. Select all that apply.: Stairs  Number of steps to enter home.: 10 (side door 2 steps & rails)  Do the steps have railings?: Yes  Type of Current Residence: [facility]  Living Arrangements: Lives w/ Spouse/significant other, Lives w/ Children  Is patient a ?: No (her spouse is 100% [employer])    Activities of Daily Living Prior to Admission  Functional Status: Independent  Completes ADLs independently?: Yes  Ambulates independently?: Yes  Does patient use assisted devices?: Yes  Assisted Devices (DME) used: Straight Cane, Other (Comment) (walk in shower & seat)  Does patient currently own DME?: Yes  What DME does  the patient currently own?: Bedside Commode, Wheelchair, Crutches  Does patient have a history of Outpatient Therapy (PT/OT)?: No  Does the patient have a history of Short-Term Rehab?: No  Does patient have a history of HHC?: Yes (jossy)  Does patient currently have HHC?: No         Patient Information Continued  Income Source: Pension/MCC  Does patient have prescription coverage?: Yes ([employer])  Does patient receive dialysis treatments?: No  Does patient have a history of substance abuse?: No  Does patient have a history of Mental Health Diagnosis?: No         Means of Transportation  Means of Transport to Appts:: Drives Self          DISCHARGE DETAILS:    Discharge planning discussed with:: patient  Freedom of Choice: Yes                        Requested Home Health Care         Is the patient interested in HHC at discharge?: No    DME Referral Provided  Referral made for DME?: No (pt is on oxygen and she does not have home oxygen)    Other Referral/Resources/Interventions Provided:  Referral Comments: pt not stable for d/c -pt on 4 liters of oxygen (no home oxygen), IV antibitoics,    Would you like to participate in our [facility] service program?  : No - Declined    Treatment Team Recommendation: Home (home with spouse & outpt follow up- TBD)

## 2024-10-24 NOTE — UTILIZATION REVIEW
Initial Clinical Review    Admission: Date/Time/Statement:   Admission Orders (From admission, onward)       Ordered        10/23/24 2040  INPATIENT ADMISSION  Once                          Orders Placed This Encounter   Procedures    INPATIENT ADMISSION     Standing Status:   Standing     Number of Occurrences:   1     Order Specific Question:   Level of Care     Answer:   Med Surg [16]     Order Specific Question:   Estimated length of stay     Answer:   More than 2 Midnights     Order Specific Question:   Certification     Answer:   I certify that inpatient services are medically necessary for this patient for a duration of greater than two midnights. See H&P and MD Progress Notes for additional information about the patient's course of treatment.     ED Arrival Information       Expected   -    Arrival   10/23/2024 19:07    Acuity   Urgent              Means of arrival   Wheelchair    Escorted by   Family Member    Service   Hospitalist    Admission type   Emergency              Arrival complaint   AMS, headache             Chief Complaint   Patient presents with    Headache     Pt reports headache earlier today and took tylenol noting it went away. Pts daughter notes pt was not coherent and was not making sense since early afternoon       Initial Presentation: 79 y.o. female to the ED from home with complaints of headache, altered mental status.  Admitted to inpatient for sepsis with acute respiratory infection, pneumonia. H/O tobacco use, COPD, PAD, Yomaira/depression, hypertension, obesity, and diabetes.  CT scan shows pneumonia. Pulse ox on room air 80s.  Placed on oxygen 3LNC.  Blood cultures pending. Started oN IV abx. WBcs 19.75 on arrival. Lactic acdi 2.6-2.1. Started on nebulizers. Given 1 liter ivfluid bolus in the ED.     Anticipated Length of Stay/Certification Statement: : Patient will be admitted on an inpatient basis with an anticipated length of stay of greater than 2 midnights secondary to  respiratory failure and sepsis.     Date: 10/24   Day 2:  Acute toxic metabolic encephalopathy is resolved.  DC iv fluids.  Continue with IV abx.  Blood cultures pending. WBCs improving.  Date: 10/25  Day 3: Has surpassed a 2nd midnight with active treatments and services.INitially admitted for sepsis, pneumonia.  Continue with IV abx, nebulizers.  Blood cultures pending.          ED Treatment-Medication Administration from 10/23/2024 1906 to 10/24/2024 0049         Date/Time Order Dose Route Action     10/23/2024 2009 cefepime (MAXIPIME) IVPB (premix in dextrose) 2,000 mg 50 mL 2,000 mg Intravenous New Bag     10/23/2024 1959 iohexol (OMNIPAQUE) 350 MG/ML injection (MULTI-DOSE) 85 mL 85 mL Intravenous Given     10/23/2024 2015 sodium chloride 0.9 % bolus 1,000 mL 1,000 mL Intravenous New Bag     10/24/2024 0001 clonazePAM (KlonoPIN) tablet 1 mg 1 mg Oral Given     10/24/2024 0003 cefTRIAXone (ROCEPHIN) IVPB (premix in dextrose) 1,000 mg 50 mL 1,000 mg Intravenous New Bag     10/24/2024 0001 acetaminophen (TYLENOL) tablet 975 mg 975 mg Oral Given     10/24/2024 0005 multi-electrolyte (PLASMALYTE-A/ISOLYTE-S PH 7.4) IV solution 75 mL/hr Intravenous New Bag            Scheduled Medications:  acetaminophen, 975 mg, Oral, Q8H JILLIAN  albuterol, 2 puff, Inhalation, BID  AMILoride, 5 mg, Oral, Daily  aspirin, 81 mg, Oral, Daily  atorvastatin, 10 mg, Oral, Daily With Dinner  azithromycin, 500 mg, Intravenous, Q24H  buPROPion, 300 mg, Oral, Daily  cefTRIAXone, 1,000 mg, Intravenous, Q24H  clonazePAM, 1 mg, Oral, QPM  docusate sodium, 100 mg, Oral, BID  fluticasone-vilanterol, 1 puff, Inhalation, Daily  lisinopril, 5 mg, Oral, Daily  oxybutynin, 5 mg, Oral, Daily  pantoprazole, 40 mg, Oral, Daily  polyethylene glycol, 17 g, Oral, Daily  pregabalin, 200 mg, Oral, Daily  rivaroxaban, 2.5 mg, Oral, BID      Continuous IV Infusions:  multi-electrolyte, 75 mL/hr, Intravenous, Continuous      PRN Meds:     ED Triage Vitals    Temperature Pulse Respirations Blood Pressure SpO2 Pain Score   10/23/24 1921 10/23/24 1919 10/23/24 1919 10/23/24 1919 10/23/24 1919 10/23/24 1919   99.8 °F (37.7 °C) 94 18 127/55 (!) 85 % No Pain     Weight (last 2 days)       Date/Time Weight    10/24/24 0100 79 (174.16)            Vital Signs (last 3 days)       Date/Time Temp Pulse Resp BP MAP (mmHg) SpO2 Calculated FIO2 (%) - Nasal Cannula Nasal Cannula O2 Flow Rate (L/min) O2 Device [place] Coma Scale Score Pain    10/24/24 09:21:51 -- 76 -- 123/45 71 93 % -- -- -- -- --    10/24/24 0900 -- -- -- -- -- -- -- -- -- -- No Pain    10/24/24 0838 -- -- -- -- -- 80 % -- -- None (Room air) -- --    10/24/24 07:19:59 98.2 °F (36.8 °C) 78 17 -- -- 94 % -- -- -- -- --    10/24/24 0632 -- -- -- -- -- -- -- -- -- -- No Pain    10/24/24 0100 -- -- -- -- -- -- 36 4 L/min Nasal cannula 15 --    10/24/24 00:58:19 98 °F (36.7 °C) 78 17 102/69 80 90 % -- -- -- -- --    10/24/24 0051 -- -- -- -- -- -- -- -- -- -- No Pain    10/23/24 2300 -- -- 20 -- -- -- 36 4 L/min Nasal cannula -- --    10/23/24 2037 -- -- -- -- -- -- -- -- Nasal cannula -- --    10/23/24 2036 -- -- -- -- -- -- -- -- -- 15 --    10/23/24 2015 -- 86 16 112/52 75 92 % 36 4 L/min Nasal cannula -- --    10/23/24 1930 -- 93 -- 127/52 138 90 % 32 3 L/min Nasal cannula -- --    10/23/24 1921 99.8 °F (37.7 °C) -- -- -- -- -- -- -- -- -- --    10/23/24 1919 -- 94 18 127/55 -- 85 % -- -- None (Room air) -- No Pain            Pertinent Labs/Diagnostic Test Results:   Radiology:  CT chest with contrast   Final Interpretation by Rona Marte MD (10/23 2038)      Right lower lobe consolidation and additional tree-in-bud and groundglass airspace opacities throughout the right lung suspicious for pneumonia.      Chronic seroma underlying the right pectoralis major muscle at the proximal axillary bypass graft anastomosis               Workstation performed: [id number]         CT head wo contrast   Final Interpretation by  Santos Pace MD (10/23 2020)      No mass effect, acute intracranial hemorrhage or evidence of recent infarction.      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 Correlate with direct visual inspection.                  Workstation performed: [id number]             Results from last 7 days   Lab Units 10/23/24  1930   SARS-COV-2  Negative     Results from last 7 days   Lab Units 10/24/24  0440 10/23/24  1930   WBC Thousand/uL 15.46* 19.75*   HEMOGLOBIN g/dL 12.4 13.9   HEMATOCRIT % 40.1 44.1   PLATELETS Thousands/uL 200 245   TOTAL NEUT ABS Thousands/µL  --  17.51*   BANDS PCT % 14*  --          Results from last 7 days   Lab Units 10/24/24  0440 10/23/24  1930   SODIUM mmol/L 137 132*   POTASSIUM mmol/L 4.2 5.2   CHLORIDE mmol/L 102 97   CO2 mmol/L 29 28   ANION GAP mmol/L 6 7   BUN mg/dL 11 12   CREATININE mg/dL 0.65 0.73   EGFR ml/min/1.73sq m 85 79   CALCIUM mg/dL 9.8 10.4*   MAGNESIUM mg/dL 1.5*  --    PHOSPHORUS mg/dL 4.2*  --      Results from last 7 days   Lab Units 10/23/24  1930   AST U/L 18   ALT U/L 8   ALK PHOS U/L 57   TOTAL PROTEIN g/dL 6.7   ALBUMIN g/dL 4.2   TOTAL BILIRUBIN mg/dL 0.38         Results from last 7 days   Lab Units 10/24/24  0440 10/23/24  1930   GLUCOSE RANDOM mg/dL 117 141*             Results from last 7 days   Lab Units 10/23/24  1930   PROTIME seconds 14.8   INR  1.11   PTT seconds 32         Results from last 7 days   Lab Units 10/24/24  0440 10/23/24  1930   PROCALCITONIN ng/ml 1.26* 1.29*     Results from last 7 days   Lab Units 10/23/24  2226 10/23/24  1930   LACTIC ACID mmol/L 2.1* 2.6*           Results from last 7 days   Lab Units 10/23/24  2052   CLARITY UA  Clear   COLOR UA  Yellow   SPEC GRAV UA  1.010   PH UA  7.5   GLUCOSE UA mg/dl Negative   KETONES UA mg/dl Negative   BLOOD UA  Negative   PROTEIN UA mg/dl Negative   NITRITE UA  Negative   BILIRUBIN UA  Negative   UROBILINOGEN UA E.U./dl 0.2   LEUKOCYTES UA  Negative     Results from last 7 days    Lab Units 10/23/24  1930   INFLUENZA A PCR  Negative   INFLUENZA B PCR  Negative   RSV PCR  Negative       Results from last 7 days   Lab Units 10/23/24  2009 10/23/24  1930   BLOOD CULTURE  Received in Microbiology Lab. Culture in Progress. Received in Microbiology Lab. Culture in Progress.       Past Medical History:   Diagnosis Date    Anxiety     Closed fracture of coccyx (HCC) 05/24/2023    COPD (chronic obstructive pulmonary disease) (HCC)     Diabetes (HCC)     Diabetes mellitus (HCC)     GERD (gastroesophageal reflux disease)     Hyperlipidemia     Hypertension     IBS (irritable bowel syndrome)     Peripheral arterial disease (HCC)     Shoulder fracture     Tobacco use      Present on Admission:   Sepsis with acute respiratory failure (HCC)   Community acquired pneumonia of right lower lobe of lung   Acute respiratory failure with hypoxia (HCC)   Type 2 diabetes mellitus with diabetic polyneuropathy (HCC)   Essential hypertension   Anxiety and depression   GERD without esophagitis   PAD (peripheral artery disease) (HCC)   Tobacco use disorder   COPD, severity to be determined (HCC)   Hypomagnesemia      Admitting Diagnosis: Pneumonia [J18.9]  Hypoxia [R09.02]  Sepsis (HCC) [A41.9]  Headache [R51.9]  Age/Sex: 79 y.o. female    Network Utilization Review Department  ATTENTION: Please call with any questions or concerns to [phone] and carefully listen to the prompts so that you are directed to the right person. All voicemails are confidential.   For Discharge needs, contact Care Management DC Support Team at [phone] opt. [phone]  Send all requests for admission clinical reviews, approved or denied determinations and any other requests to dedicated fax number below belonging to the campus where the patient is receiving treatment. List of dedicated fax numbers for the Facilities:  FACILITY NAME UR FAX NUMBER   ADMISSION DENIALS (Administrative/Medical Necessity) [phone]   DISCHARGE SUPPORT TEAM  (NETWORK) [phone]   PARENT CHILD HEALTH (Maternity/NICU/Pediatrics) [phone]   [facility] [phone]   [facility] [phone]   [facility] [phone]   [facility] [phone]   [facility] [phone]   [facility] [phone]   [facility] [phone]   [facility] [phone]   [facility] [phone]   [facility] [phone]   [facility] [phone]   [facility] [phone]

## 2024-10-24 NOTE — ASSESSMENT & PLAN NOTE
Lona Cid is a 79-year-old female with a past medical history of tobacco use, COPD, PAD, Yomaira/depression, hypertension, obesity, and diabetes.  She presents to the hospital with her family who reports that she is confused, and has complaints of a headache.  In the emergency room she meets sepsis criteria, and CAT scan shows right lower lobe pneumonia.  She was noted to be hypoxic with a room air saturation in the mid 80s.  She was started on antibiotics and will be admitted for sepsis, pneumonia, and hypoxic respiratory failure.  Blood cultures pending  Check sputum culture  Urinalysis clean  Ceftriaxone/azithromycin  Wean oxygen as tolerated, maintain saturation greater than 88%  As needed DuoNebs  Lactic acid 2.6--> 2.1  Initial procalcitonin 1.29, trend  WBC 19.75  Encephalopathy most likely due to acute infection/hypoxia, CT head negative

## 2024-10-24 NOTE — ASSESSMENT & PLAN NOTE
Sodium 132 on admission  Patient given normal saline in the emergency department and will continue on Isolyte infusion overnight  Monitor sodium closely

## 2024-10-24 NOTE — PLAN OF CARE
Problem: Prexisting or High Potential for Compromised Skin Integrity  Goal: Skin integrity is maintained or improved  Description: INTERVENTIONS:  - Identify patients at risk for skin breakdown  - Assess and monitor skin integrity  - Assess and monitor nutrition and hydration status  - Monitor labs   - Assess for incontinence   - Turn and reposition patient  - Assist with mobility/ambulation  - Relieve pressure over bony prominences  - Avoid friction and shearing  - Provide appropriate hygiene as needed including keeping skin clean and dry  - Evaluate need for skin moisturizer/barrier cream  - Collaborate with interdisciplinary team   - Patient/family teaching  - Consider wound care consult   Outcome: Progressing     Problem: PAIN - ADULT  Goal: Verbalizes/displays adequate comfort level or baseline comfort level  Description: Interventions:  - Encourage patient to monitor pain and request assistance  - Assess pain using appropriate pain scale  - Administer analgesics based on type and severity of pain and evaluate response  - Implement non-pharmacological measures as appropriate and evaluate response  - Consider cultural and social influences on pain and pain management  - Notify physician/advanced practitioner if interventions unsuccessful or patient reports new pain  Outcome: Progressing     Problem: INFECTION - ADULT  Goal: Absence or prevention of progression during hospitalization  Description: INTERVENTIONS:  - Assess and monitor for signs and symptoms of infection  - Monitor lab/diagnostic results  - Monitor all insertion sites, i.e. indwelling lines, tubes, and drains  - Monitor endotracheal if appropriate and nasal secretions for changes in amount and color  - [place] appropriate cooling/warming therapies per order  - Administer medications as ordered  - Instruct and encourage patient and family to use good hand hygiene technique  - Identify and instruct in appropriate isolation precautions for  identified infection/condition  Outcome: Progressing     Problem: SAFETY ADULT  Goal: Patient will remain free of falls  Description: INTERVENTIONS:  - Educate patient/family on patient safety including physical limitations  - Instruct patient to call for assistance with activity   - Consult OT/PT to assist with strengthening/mobility   - Keep Call bell within reach  - Keep bed low and locked with side rails adjusted as appropriate  - Keep care items and personal belongings within reach  - Initiate and maintain comfort rounds  - Make Fall Risk Sign visible to staff  - Offer Toileting every 2 Hours, in advance of need  - Initiate/Maintain bed alarm  - Apply yellow socks and bracelet for high fall risk patients  - Consider moving patient to room near nurses station  Outcome: Progressing  Goal: Maintain or return to baseline ADL function  Description: INTERVENTIONS:  -  Assess patient's ability to carry out ADLs; assess patient's baseline for ADL function and identify physical deficits which impact ability to perform ADLs (bathing, care of mouth/teeth, toileting, grooming, dressing, etc.)  - Assess/evaluate cause of self-care deficits   - Assess range of motion  - Assess patient's mobility; develop plan if impaired  - Assess patient's need for assistive devices and provide as appropriate  - Encourage maximum independence but intervene and supervise when necessary  - Involve family in performance of ADLs  - Assess for home care needs following discharge   - Consider OT consult to assist with ADL evaluation and planning for discharge  - Provide patient education as appropriate  Outcome: Progressing     Problem: DISCHARGE PLANNING  Goal: Discharge to home or other facility with appropriate resources  Description: INTERVENTIONS:  - Identify barriers to discharge w/patient and caregiver  - Arrange for needed discharge resources and transportation as appropriate  - Identify discharge learning needs (meds, wound care,  etc.)  - Arrange for interpretive services to assist at discharge as needed  - Refer to Case Management Department for coordinating discharge planning if the patient needs post-hospital services based on physician/advanced practitioner order or complex needs related to functional status, cognitive ability, or social support system  Outcome: Progressing     Problem: Knowledge Deficit  Goal: Patient/family/caregiver demonstrates understanding of disease process, treatment plan, medications, and discharge instructions  Description: Complete learning assessment and assess knowledge base.  Interventions:  - Provide teaching at level of understanding  - Provide teaching via preferred learning methods  Outcome: Progressing     Problem: RESPIRATORY - ADULT  Goal: Achieves optimal ventilation and oxygenation  Description: INTERVENTIONS:  - Assess for changes in respiratory status  - Assess for changes in mentation and behavior  - Position to facilitate oxygenation and minimize respiratory effort  - Oxygen administered by appropriate delivery if ordered  - Initiate smoking cessation education as indicated  - Encourage broncho-pulmonary hygiene including cough, deep breathe, Incentive Spirometry  - Assess the need for suctioning and aspirate as needed  - Assess and instruct to report SOB or any respiratory difficulty  - Respiratory Therapy support as indicated  Outcome: Progressing

## 2024-10-24 NOTE — ASSESSMENT & PLAN NOTE
Continue Rocephin, and Zithromax day 2  Blood cultures x 2 sets are in progress  Testing for COVID-19, RSV, and influenza is negative  Will order urine testing for both  Streptococcus antigen and Legionella antigen  Repeat blood work in the a.m.

## 2024-10-24 NOTE — ASSESSMENT & PLAN NOTE
Arrival O2 sat was 85% on room air  Patient's current oxygen requirement is 4 L of supplemental oxygen via nasal cannula with a resultant O2 sat in the low to mid 90s  Secondary to the pneumonia as outlined above  Wean oxygen as able

## 2024-10-24 NOTE — RESPIRATORY THERAPY NOTE
10/23/24 3690   Respiratory Assessment   Resp Comments will change to MDI ALB  BID as pt uses at home

## 2024-10-24 NOTE — RESPIRATORY THERAPY NOTE
RT Protocol Note  Lona Cedeno 79 y.o. female MRN: [id number]  Unit/Bed#: ED 25 Encounter: [id number]    Assessment    Active Problems:  There are no active Hospital Problems.      Home Pulmonary Medications:    Home Devices/Therapy: Other (Comment) (MDi alb prn once a day to BID, Advair BID no other resp therapy)    Past Medical History:   Diagnosis Date    Anxiety     Closed fracture of coccyx (HCC) 2023    COPD (chronic obstructive pulmonary disease) (HCC)     Diabetes (HCC)     Diabetes mellitus (HCC)     GERD (gastroesophageal reflux disease)     Hyperlipidemia     Hypertension     IBS (irritable bowel syndrome)     Peripheral arterial disease (HCC)     Shoulder fracture     Tobacco use      Social History     Socioeconomic History    Marital status: /Civil Union     Spouse name: None    Number of children: None    Years of education: None    Highest education level: None   Occupational History    None   Tobacco Use    Smoking status: Former     Current packs/day: 0.00     Average packs/day: 1 pack/day for 63.5 years (63.1 ttl pk-yrs)     Types: Cigarettes     Start date:      Quit date: 2024     Years since quittin.3    Smokeless tobacco: Never    Tobacco comments:     Quit 2024   Vaping Use    Vaping status: Never Used   Substance and Sexual Activity    Alcohol use: Not Currently    Drug use: Never    Sexual activity: Not Currently   Other Topics Concern    None   Social History Narrative    None     Social Determinants of Health     Financial Resource Strain: Low Risk  (2023)    Overall Financial Resource Strain (CARDIA)     Difficulty of Paying Living Expenses: Not hard at all   Food Insecurity: No Food Insecurity (2024)    Nursing - Inadequate Food Risk Classification     Worried About Running Out of Food in the Last Year: Never true     Ran Out of Food in the Last Year: Never true     Ran Out of Food in the Last Year: Not on file   Transportation Needs: No  Transportation Needs (7/18/2024)    PRAPARE - Transportation     Lack of Transportation (Medical): No     Lack of Transportation (Non-Medical): No   Physical Activity: Not on file   Stress: Not on file   Social Connections: Unknown (6/18/2024)    Received from [facility]     How often do you feel lonely or isolated from those around you? (Adult - for ages 18 years and over): Not on file   Intimate Partner Violence: Not on file   Housing Stability: Low Risk  (7/18/2024)    Housing Stability Vital Sign     Unable to Pay for Housing in the Last Year: No     Number of Times Moved in the Last Year: 1     Homeless in the Last Year: No       Subjective         Objective    Physical Exam:   Assessment Type: Assess only  General Appearance: Alert, Awake  Respiratory Pattern: Normal  Chest Assessment: Chest expansion symmetrical  Bilateral Breath Sounds: Clear  O2 Device: nc    Vitals:  Blood pressure 112/52, pulse 86, temperature 99.8 °F (37.7 °C), temperature source Tympanic, resp. rate 20, SpO2 92%.          Imaging and other studies: Results Review Statement: I personally reviewed the following image studies/reports in PACS and discussed pertinent findings with Radiology: chest xray. My interpretation of the radiology images/reports is: see report .    O2 Device: nc     Plan    Respiratory Plan: Home Bronchodilator Patient pathway        Resp Comments: pt has Pulm hx uses MDi alb prn up to BID and Advair BID , no other resp threrapy, will order MDI alb prn  pt aware no other resp indicated for at this time

## 2024-10-24 NOTE — PLAN OF CARE
Problem: Prexisting or High Potential for Compromised Skin Integrity  Goal: Skin integrity is maintained or improved  Description: INTERVENTIONS:  - Identify patients at risk for skin breakdown  - Assess and monitor skin integrity  - Assess and monitor nutrition and hydration status  - Monitor labs   - Assess for incontinence   - Turn and reposition patient  - Assist with mobility/ambulation  - Relieve pressure over bony prominences  - Avoid friction and shearing  - Provide appropriate hygiene as needed including keeping skin clean and dry  - Evaluate need for skin moisturizer/barrier cream  - Collaborate with interdisciplinary team   - Patient/family teaching  - Consider wound care consult   Outcome: Progressing     Problem: PAIN - ADULT  Goal: Verbalizes/displays adequate comfort level or baseline comfort level  Description: Interventions:  - Encourage patient to monitor pain and request assistance  - Assess pain using appropriate pain scale  - Administer analgesics based on type and severity of pain and evaluate response  - Implement non-pharmacological measures as appropriate and evaluate response  - Consider cultural and social influences on pain and pain management  - Notify physician/advanced practitioner if interventions unsuccessful or patient reports new pain  Outcome: Progressing     Problem: INFECTION - ADULT  Goal: Absence or prevention of progression during hospitalization  Description: INTERVENTIONS:  - Assess and monitor for signs and symptoms of infection  - Monitor lab/diagnostic results  - Monitor all insertion sites, i.e. indwelling lines, tubes, and drains  - Monitor endotracheal if appropriate and nasal secretions for changes in amount and color  - [place] appropriate cooling/warming therapies per order  - Administer medications as ordered  - Instruct and encourage patient and family to use good hand hygiene technique  - Identify and instruct in appropriate isolation precautions for  identified infection/condition  Outcome: Progressing     Problem: SAFETY ADULT  Goal: Patient will remain free of falls  Description: INTERVENTIONS:  - Educate patient/family on patient safety including physical limitations  - Instruct patient to call for assistance with activity   - Consult OT/PT to assist with strengthening/mobility   - Keep Call bell within reach  - Keep bed low and locked with side rails adjusted as appropriate  - Keep care items and personal belongings within reach  - Initiate and maintain comfort rounds  - Make Fall Risk Sign visible to staff  - Offer Toileting every 2 Hours, in advance of need  - Initiate/Maintain bed alarm  - Apply yellow socks and bracelet for high fall risk patients  - Consider moving patient to room near nurses station  Outcome: Progressing     Problem: RESPIRATORY - ADULT  Goal: Achieves optimal ventilation and oxygenation  Description: INTERVENTIONS:  - Assess for changes in respiratory status  - Assess for changes in mentation and behavior  - Position to facilitate oxygenation and minimize respiratory effort  - Oxygen administered by appropriate delivery if ordered  - Initiate smoking cessation education as indicated  - Encourage broncho-pulmonary hygiene including cough, deep breathe, Incentive Spirometry  - Assess the need for suctioning and aspirate as needed  - Assess and instruct to report SOB or any respiratory difficulty  - Respiratory Therapy support as indicated  Outcome: Progressing     Problem: Knowledge Deficit  Goal: Patient/family/caregiver demonstrates understanding of disease process, treatment plan, medications, and discharge instructions  Description: Complete learning assessment and assess knowledge base.  Interventions:  - Provide teaching at level of understanding  - Provide teaching via preferred learning methods  Outcome: Progressing     Problem: DISCHARGE PLANNING  Goal: Discharge to home or other facility with appropriate  resources  Description: INTERVENTIONS:  - Identify barriers to discharge w/patient and caregiver  - Arrange for needed discharge resources and transportation as appropriate  - Identify discharge learning needs (meds, wound care, etc.)  - Arrange for interpretive services to assist at discharge as needed  - Refer to Case Management Department for coordinating discharge planning if the patient needs post-hospital services based on physician/advanced practitioner order or complex needs related to functional status, cognitive ability, or social support system  Outcome: Progressing

## 2024-10-24 NOTE — ASSESSMENT & PLAN NOTE
Sepsis was present on admission -patient had a white blood cell count of 19.75, heart rate of 94, and a known infection of the right lower lobe pneumonia  Sepsis parameters have improved  White blood cell count is down to 15.46, and the patient is no longer tachycardic  See the management as outlined below  Acute toxic metabolic encephalopathy-patient demonstrated some confusion at time of arrival, currently it has resolved.  This occurred in the setting of sepsis and hypoxia.  Okay to DC IV fluid

## 2024-10-24 NOTE — ASSESSMENT & PLAN NOTE
Noted on CAT scan  Plan as above  Patient currently afebrile   Ricki Solis is a 73 y.o. male and presents with Hypertension, Gout, and Foot Problem  . Subjective:  Hypertension Review:  The patient has essential hypertension  Diet and Lifestyle: generally follows a  low sodium diet, exercises sporadically  Home BP Monitoring: is not measured at home. Pertinent ROS: taking medications as instructed, no medication side effects noted, no TIA's, no chest pain on exertion, no dyspnea on exertion, no swelling of ankles. Gout Review:  Patient has gout, primarily affecting the foot. The patient has been stable with no recent joint pains  Symptoms onset: problem is longstanding. Rheumatological ROS: using NSAID medication regularly, daily. Response to treatment plan: symptoms have progressed to a point and plateaued. The most recent uric acid was normal.    Erectile dysfunction Review[de-identified]  Patient complains of difficulty in maintaining an adequate erection. He states that his present medication is helping thus far. Vitamin D Deficiency Review   The patient has a previous history of vitamin d deficiency  The patient is on medication for vitamin d deficiency  The patient has denied any recent falls or fractures    He states he has a recurrent rash on the foot. Review of Systems  Constitutional: negative for fevers, chills, anorexia and weight loss  Eyes:   negative for visual disturbance and irritation  ENT:   negative for tinnitus,sore throat,nasal congestion,ear pains. hoarseness  Respiratory:  negative for cough, hemoptysis, dyspnea,wheezing  CV:   negative for chest pain, palpitations, lower extremity edema  GI:   negative for nausea, vomiting, diarrhea, abdominal pain,melena  Endo:               negative for polyuria,polydipsia,polyphagia,heat intolerance  Genitourinary: negative for frequency, dysuria and hematuria  Integument:  rash and pruritus  Hematologic:  negative for easy bruising and gum/nose bleeding  Musculoskel: negative for myalgias, arthralgias, back pain, muscle weakness, joint pain  Neurological:  negative for headaches, dizziness, vertigo, memory problems and gait   Behavl/Psych: negative for feelings of anxiety, depression, mood changes    Past Medical History:   Diagnosis Date    Arthritis     Erectile dysfunction     Gout     Hypertension      History reviewed. No pertinent surgical history. Social History     Socioeconomic History    Marital status: SINGLE     Spouse name: Not on file    Number of children: Not on file    Years of education: Not on file    Highest education level: Not on file   Tobacco Use    Smoking status: Light Tobacco Smoker    Smokeless tobacco: Never Used   Vaping Use    Vaping Use: Never used   Substance and Sexual Activity    Alcohol use: No    Drug use: No    Sexual activity: Yes     Partners: Female     Social Determinants of Health     Financial Resource Strain:     Difficulty of Paying Living Expenses:    Food Insecurity:     Worried About Running Out of Food in the Last Year:     [address] in the Last Year:    Transportation Needs:     Lack of Transportation (Medical):  Lack of Transportation (Non-Medical):    Physical Activity:     Days of Exercise per Week:     Minutes of Exercise per Session:    Stress:     Feeling of Stress :    Social Connections:     Frequency of Communication with Friends and Family:     Frequency of Social Gatherings with Friends and Family:     Attends [employer] Services:     Active Member of Clubs or Organizations:     Attends Club or Organization Meetings:     Marital Status:      Family History   Problem Relation Age of Onset    Diabetes Father      Current Outpatient Medications   Medication Sig Dispense Refill    sildenafil citrate (VIAGRA) 100 mg tablet TAKE ONE TABLET BY MOUTH FOR INABILITY TO HAVE AN ERECTION BEFORE SEX 30 Tablet 11    amLODIPine (NORVASC) 5 mg tablet TAKE ONE TABLET BY MOUTH EVERY DAY FOR HYPERTENSION .  30 Tablet 11    allopurinoL (ZYLOPRIM) 300 mg tablet TAKE ONE TABLET BY MOUTH EVERY DAY 30 Tab 12    lisinopriL (PRINIVIL, ZESTRIL) 10 mg tablet TAKE ONE TABLET BY MOUTH EVERY DAY FOR HYPERTENSION 30 Tab 11    Flovent  mcg/actuation inhaler INHALE TWO PUFFS BY MOUTH EVERY 12 HOURS 1 Inhaler 11    ergocalciferol (ERGOCALCIFEROL) 1,250 mcg (50,000 unit) capsule TAKE ONE CAPSULE BY MOUTH EVERY 7 DAYS 4 Cap 11    ibuprofen (MOTRIN) 800 mg tablet TAKE ONE TABLET BY MOUTH TWICE A DAY AFTER MEALS (DO NOT TAKE WITH NAPROXEN) 60 Tab 10    ergocalciferol (ERGOCALCIFEROL) 50,000 unit capsule TAKE ONE CAPSULE BY MOUTH EVERY 7 DAYS. 4 Cap 11    naproxen (NAPROSYN) 500 mg tablet TAKE ONE TABLET BY MOUTH TWICE A DAY AFTER MEALS 60 Tab 11    sucralfate (CARAFATE) 1 gram tablet Take 1 Tab by mouth four (4) times daily.  120 Tab 12    amLODIPine (NORVASC) 5 mg tablet TAKE ONE TABLET BY MOUTH EVERY DAY FOR HYPERTENSION 30 Tab 11     No Known Allergies    Objective:  Visit Vitals  /70 (BP 1 Location: Right arm, BP Patient Position: Sitting, BP Cuff Size: Adult)   Pulse 76   Temp 98.6 °F (37 °C) (Temporal)   Resp 20   Ht 5' 6\" (1.676 m)   Wt 174 lb (78.9 kg)   SpO2 100%   BMI 28.08 kg/m²     Physical Exam:   General appearance - alert, well appearing, and in no distress  Mental status - alert, oriented to person, place, and time  EYE-GAIL, EOMI, corneas normal, no foreign bodies  ENT-ENT exam normal, no neck nodes or sinus tenderness  Nose - normal and patent, no erythema, discharge or polyps  Mouth - mucous membranes moist, pharynx normal without lesions  Neck - supple, no significant adenopathy   Chest - clear to auscultation, no wheezes, rales or rhonchi, symmetric air entry   Heart - normal rate, regular rhythm, normal S1, S2, no murmurs, rubs, clicks or gallops   Abdomen - soft, nontender, nondistended, no masses or organomegaly  Lymph- no adenopathy palpable  Ext-peripheral pulses normal, no pedal edema, no clubbing or cyanosis  Skin-Warm and dry. no hyperpigmentation, vitiligo, or suspicious lesions  Neuro -alert, oriented, normal speech, no focal findings or movement disorder noted  Neck-normal C-spine, no tenderness, full ROM without pain  Feet-no nail deformities or callus formation with good pulses noted      Results for orders placed or performed in visit on 14/75/69   METABOLIC PANEL, COMPREHENSIVE   Result Value Ref Range    Sodium 136 136 - 145 mmol/L    Potassium 4.8 3.5 - 5.1 mmol/L    Chloride 104 97 - 108 mmol/L    CO2 27 21 - 32 mmol/L    Anion gap 5 5 - 15 mmol/L    Glucose 87 65 - 100 mg/dL    BUN 24 (H) 6 - 20 MG/DL    Creatinine 1.25 0.70 - 1.30 MG/DL    BUN/Creatinine ratio 19 12 - 20      GFR est AA >60 >60 ml/min/1.73m2    GFR est non-AA 58 (L) >60 ml/min/1.73m2    Calcium 9.2 8.5 - 10.1 MG/DL    Bilirubin, total 0.5 0.2 - 1.0 MG/DL    ALT (SGPT) 24 12 - 78 U/L    AST (SGOT) 28 15 - 37 U/L    Alk. phosphatase 94 45 - 117 U/L    Protein, total 7.6 6.4 - 8.2 g/dL    Albumin 4.3 3.5 - 5.0 g/dL    Globulin 3.3 2.0 - 4.0 g/dL    A-G Ratio 1.3 1.1 - 2.2     CBC W/O DIFF   Result Value Ref Range    WBC 5.0 4.1 - 11.1 K/uL    RBC 4.91 4.10 - 5.70 M/uL    HGB 13.0 12.1 - 17.0 g/dL    HCT 42.7 36.6 - 50.3 %    MCV 87.0 80.0 - 99.0 FL    MCH 26.5 26.0 - 34.0 PG    MCHC 30.4 30.0 - 36.5 g/dL    RDW 15.5 (H) 11.5 - 14.5 %    PLATELET 905 561 - 178 K/uL    MPV 10.6 8.9 - 12.9 FL    NRBC 0.0 0  WBC    ABSOLUTE NRBC 0.00 0.00 - 0.01 K/uL   AMB POC LIPID PROFILE   Result Value Ref Range    Cholesterol (POC) 181     Triglycerides (POC) 130     HDL Cholesterol (POC) 25     Non-HDL Cholesterol 156     LDL Cholesterol (POC) 130 MG/DL    TChol/HDL Ratio (POC) 7.3        Assessment/Plan:    ICD-10-CM ICD-9-CM    1. Essential hypertension  I10 401.9 AMB POC LIPID PROFILE      CBC W/O DIFF      METABOLIC PANEL, COMPREHENSIVE   2. Vitamin D deficiency  E55.9 268.9    3.  Chronic gout of right knee, unspecified cause  M1A.0610 274.02    4. Erectile disorder due to medical condition in male  N52.1 607.84    5. Rash of foot  R21 782.1 REFERRAL TO PODIATRY     Orders Placed This Encounter    CBC W/O DIFF     Standing Status:   Future     Standing Expiration Date:       METABOLIC PANEL, COMPREHENSIVE     Standing Status:   Future     Standing Expiration Date:   2022    REFERRAL TO PODIATRY     Referral Priority:   Routine     Referral Type:   Consultation     Referral Reason:   Specialty Services Required     Referral Location:   [facility] Specialty:   Podiatry     Number of Visits Requested:   1    AMB POC LIPID PROFILE     lose weight, follow low fat diet, follow low salt diet, continue present plan, call if any problems,Take 81mg aspirin daily  Patient Instructions   [employer] Activation    Thank you for requesting access to [facility]. Please follow the instructions below to securely access and download your online medical record. [facility] allows you to send messages to your doctor, view your test results, renew your prescriptions, schedule appointments, and more. How Do I Sign Up? 1. In your internet browser, go to [email or web address]  2. Click on the First Time User? Click Here link in the Sign In box. You will be redirect to the New Member Sign Up page. 3. Enter your [facility] Access Code exactly as it appears below. You will not need to use this code after youve completed the sign-up process. If you do not sign up before the expiration date, you must request a new code. [facility] Access Code: Activation code not generated  Current [facility] Status: Active (This is the date your [facility] access code will )    4. Enter the last four digits of your Social Security Number (xxxx) and Date of Birth (mm/dd/yyyy) as indicated and click Submit. You will be taken to the next sign-up page. 5. Create a [facility] ID.  This will be your [facility] login ID and cannot be changed, so think of one that is secure and easy to remember. 6. Create a [facility] password. You can change your password at any time. 7. Enter your Password Reset Question and Answer. This can be used at a later time if you forget your password. 8. Enter your e-mail address. You will receive e-mail notification when new information is available in [address] 9. Click Sign Up. You can now view and download portions of your medical record. 10. Click the Download Summary menu link to download a portable copy of your medical information. Additional Information    If you have questions, please visit the Frequently Asked Questions section of the [facility] website at [email or web address] Remember, [facility] is NOT to be used for urgent needs. For medical emergencies, dial 911. Follow-up and Dispositions    · Return in about 3 months (around 12/17/2021), or if symptoms worsen or fail to improve. I have reviewed with the patient details of the assessment and plan and all questions were answered. Relevent patient education was performed. The most recent lab findings were reviewed with the patient. An After Visit Summary was printed and given to the patient.

## 2024-10-24 NOTE — ASSESSMENT & PLAN NOTE
"Lab Results   Component Value Date    HGBA1C 7.6 (H) 06/08/2024       No results for input(s): \"POCGLU\" in the last 72 hours.    Blood Sugar Average: Last 72 hrs:    Hold oral agents  Sliding scale insulin  Diabetic diet  Patient would benefit greatly from a very low carbohydrate diet (less than 50 g daily) and intermittent fasting  Hypoglycemia protocol  ACHS glucose checks  "

## 2024-10-24 NOTE — ASSESSMENT & PLAN NOTE
"Lab Results   Component Value Date    HGBA1C 7.6 (H) 06/08/2024       No results for input(s): \"POCGLU\" in the last 72 hours.    Blood Sugar Average: Last 72 hrs:    Oral hypoglycemic medications are on hold  Target blood sugar for the hospital is 140-180  Implement Accu-Cheks before meals and at bedtime and according sliding scale coverage  "

## 2024-10-24 NOTE — ASSESSMENT & PLAN NOTE
Patient is an active smoker with a history of COPD, I imagine her baseline oxygen saturation is 88 or better  Maintain nasal cannula oxygen and wean as tolerated for an oxygen saturation of greater than 88  Plan as above

## 2024-10-24 NOTE — H&P
"H&P - Hospitalist   Name: Lona Cedeno 79 y.o. female I MRN: [id number]  Unit/Bed#: -01 I Date of Admission: 10/23/2024   Date of Service: 10/24/2024 I Hospital Day: 1     Assessment & Plan  Sepsis with acute respiratory failure (HCC)  Lona Cid is a 79-year-old female with a past medical history of tobacco use, COPD, PAD, Yomaira/depression, hypertension, obesity, and diabetes.  She presents to the hospital with her family who reports that she is confused, and has complaints of a headache.  In the emergency room she meets sepsis criteria, and CAT scan shows right lower lobe pneumonia.  She was noted to be hypoxic with a room air saturation in the mid 80s.  She was started on antibiotics and will be admitted for sepsis, pneumonia, and hypoxic respiratory failure.  Blood cultures pending  Check sputum culture  Urinalysis clean  Ceftriaxone/azithromycin  Wean oxygen as tolerated, maintain saturation greater than 88%  As needed DuoNebs  Lactic acid 2.6--> 2.1  Initial procalcitonin 1.29, trend  WBC 19.75  Encephalopathy most likely due to acute infection/hypoxia, CT head negative  Community acquired pneumonia of right lower lobe of lung  Noted on CAT scan  Plan as above  Patient currently afebrile  Acute respiratory failure with hypoxia (HCC)  Patient is an active smoker with a history of COPD, I imagine her baseline oxygen saturation is 88 or better  Maintain nasal cannula oxygen and wean as tolerated for an oxygen saturation of greater than 88  Plan as above  Type 2 diabetes mellitus with diabetic polyneuropathy (HCC)  Lab Results   Component Value Date    HGBA1C 7.6 (H) 06/08/2024       No results for input(s): \"POCGLU\" in the last 72 hours.    Blood Sugar Average: Last 72 hrs:    Hold oral agents  Sliding scale insulin  Diabetic diet  Patient would benefit greatly from a very low carbohydrate diet (less than 50 g daily) and intermittent fasting  Hypoglycemia protocol  ACHS glucose checks  Essential " hypertension  Blood pressure stable   Continue amiloride and lisinopril   Anxiety and depression  Continue prehospital Wellbutrin  mg p.o.   Continue clonazepam 1 mg in the evening  Mood currently stable  GERD without esophagitis  Continue Protonix 40 mg p.o. daily   PAD (peripheral artery disease) (HCC)  Continue aspirin, Lipitor, and Xarelto   COPD, severity to be determined (HCC)  Patient continues to smoke  Advair is nonformulary, continue dose equivalent  As needed DuoNebs  Tobacco use disorder  Nicotine patch offered  Cessation recommended  Hyponatremia  Sodium 132 on admission  Patient given normal saline in the emergency department and will continue on Isolyte infusion overnight  Monitor sodium closely      VTE Pharmacologic Prophylaxis: VTE Score: 7 High Risk (Score >/= 5) - Pharmacological DVT Prophylaxis Ordered: rivaroxaban (Xarelto). Sequential Compression Devices Ordered.  Code Status: Level 3 - DNAR and DNI   Discussion with patient    Anticipated Length of Stay: Patient will be admitted on an inpatient basis with an anticipated length of stay of greater than 2 midnights secondary to respiratory failure and sepsis.    History of Present Illness   Chief Complaint: Altered mental status, headache    Lona Cedeno is a 79-year-old female with a past medical history of tobacco use, COPD, PAD, Yomaira/depression, hypertension, obesity, and diabetes.  She presents to the hospital with her family who reports that she is confused, and has complaints of a headache.  In the emergency room she meets sepsis criteria, and CAT scan shows right lower lobe pneumonia.  She was noted to be hypoxic with a room air saturation in the mid 80s.  She was started on antibiotics and will be admitted for sepsis, pneumonia, and hypoxic respiratory failure.     Review of Systems   Constitutional:  Positive for fatigue and fever. Negative for chills.   HENT:  Negative for ear pain and sore throat.    Eyes:  Negative for pain  and visual disturbance.   Respiratory:  Negative for cough and shortness of breath.    Cardiovascular:  Negative for chest pain and palpitations.   Gastrointestinal:  Negative for abdominal pain and vomiting.   Genitourinary:  Negative for dysuria and hematuria.   Musculoskeletal:  Negative for arthralgias and back pain.   Skin:  Negative for color change and rash.   Neurological:  Positive for headaches. Negative for seizures and syncope.   All other systems reviewed and are negative.      Historical Information   Past Medical History:   Diagnosis Date    Anxiety     Closed fracture of coccyx (HCC) 2023    COPD (chronic obstructive pulmonary disease) (HCC)     Diabetes (HCC)     Diabetes mellitus (HCC)     GERD (gastroesophageal reflux disease)     Hyperlipidemia     Hypertension     IBS (irritable bowel syndrome)     Peripheral arterial disease (HCC)     Shoulder fracture     Tobacco use      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;  Surgeon: Cristian Child MD;  Location: [facility];  Service: Urology    EVACUATION OF HEMATOMA Right 2024    Procedure: EVACUATION/ DRAINAGE CHEST WALL  HEMATOMA;  Surgeon: Seth Hoyos MD;  Location: [facility];  Service: Vascular    FL RETROGRADE PYELOGRAM  2023    HERNIA REPAIR      umbilicus w/ mesh    NM BYPASS W/VEIN AXILLARY-FEMORAL Right 2024    Procedure: BYPASS AXILLO-FEMORAL, RIGHT WITH 8MM RINGED PTFE GRAFT;  Surgeon: Seth Hoyos MD;  Location: [facility];  Service: Vascular    SPLIT THICKNESS SKIN GRAFT Right 2/28/2024    Procedure: SKIN GRAFT SPLIT THICKNESS (STSG)  EXTREMITY, Wound vac dressing change;  Surgeon: Rigo Yeboah DPM;  Location: [facility];  Service: Podiatry    WOUND DEBRIDEMENT Right 2/4/2024    Procedure: RIGHT WOUND AND ACHILLES DEBRIDEMENT FOOT/TOE (WASH OUT); PARTIAL AMPUTATION DISTAL 2ND TOE;  Surgeon: Aaron Montero DPM;  Location: [facility];  Service: Podiatry    WOUND DEBRIDEMENT Right 2/14/2024    Procedure: DEBRIDEMENT RIGHT GROIN  WOUND AND WASHOUT, APPLICATION OF WOUND VAC;  Surgeon: Suly Muro DO;  [facility]: [facility];  Service: Vascular    WOUND DEBRIDEMENT Right 2/19/2024    Procedure: DEBRIDEMENT LOWER EXTREMITY, washout;  Surgeon: Suly Muro DO;  Location: [facility];  Service: Vascular     Social History     Tobacco Use    Smoking status: Every Day     Current packs/day: 1.50     Average packs/day: 1 pack/day for 63.8 years (64.1 ttl pk-yrs)     Types: Cigarettes     Start date: 1961    Smokeless tobacco: Never    Tobacco comments:     Quit January 31, 2024   Vaping Use    Vaping status: Never Used   Substance and Sexual Activity    Alcohol use: Not Currently    Drug use: Never    Sexual activity: Not Currently     E-Cigarette/Vaping    E-Cigarette Use Never User      E-Cigarette/Vaping Substances    Nicotine No     THC No     CBD No     Flavoring No     Other No     Unknown No      Family History   Problem Relation Age of Onset    COPD Mother     Emphysema Mother     COPD Father     Emphysema Father      Social History:  Marital Status: /Civil Union   Occupation: Retired  Patient Pre-hospital Living Situation: Home, With spouse  Patient Pre-hospital Level of Mobility: walks with walker  Patient Pre-hospital Diet Restrictions: None    Meds/Allergies   I have reviewed home medications with patient family member.  Prior to Admission medications    Medication Sig Start Date End Date Taking?  Authorizing Provider   acetaminophen (TYLENOL) 325 mg tablet Take 975 mg by mouth every 6 (six) hours as needed for mild pain   Yes Historical Provider, MD   AMILoride 5 mg tablet  6/4/24  Yes Historical Provider, MD   Apoaequorin 20 MG CAPS Take 20 mg by mouth in the morning 4/18/24  Yes Vivek Preston DO   Aspirin (ASPIR-81 PO) take one by mouth daily 1/14/14  Yes Historical Provider, MD   atorvastatin (LIPITOR) 10 mg tablet Take 1 tablet (10 mg total) by mouth daily with dinner 6/24/24  Yes Vivek Preston DO   buPROPion (WELLBUTRIN XL) 300 mg 24 hr tablet TAKE ONE TABLET BY MOUTH EVERY DAY 9/17/24  Yes Vivek Preston DO   Januvia 100 MG tablet Take 1 tablet (100 mg total) by mouth daily 10/16/24  Yes Vivek Preston DO   lisinopril (ZESTRIL) 5 mg tablet TAKE ONE TABLET BY MOUTH EVERY DAY 8/23/24  Yes Vivek Preston DO   metFORMIN (GLUCOPHAGE) 1000 MG tablet TAKE ONE TABLET BY MOUTH TWICE A DAY WITH MEALS 10/21/24  Yes Vivek Preston DO   pantoprazole (PROTONIX) 40 mg tablet Take 1 tablet (40 mg total) by mouth daily 6/24/24  Yes Vivek Preston DO   polyethylene glycol (MIRALAX) 17 g packet Take 17 g by mouth daily 2/24/24  Yes Clara Gann,    potassium chloride (Klor-Con M10) 10 mEq tablet TAKE TWO TABLETS BY MOUTH EVERY DAY 9/17/24  Yes Vivek Preston DO   pregabalin (LYRICA) 200 MG capsule TAKE ONE CAPSULE BY MOUTH THREE TIMES A DAY 10/1/24  Yes Vivek Preston DO   Xarelto 2.5 MG tablet TAKE ONE TABLET BY MOUTH TWICE A DAY 10/14/24  Yes Vivek Preston DO   albuterol (Ventolin HFA) 90 mcg/act inhaler Inhale 2 puffs every 6 (six) hours as needed for wheezing 6/24/24   Vivek Preston DO   ammonium lactate (LAC-HYDRIN) 12 % lotion Apply topically daily To dry scaling skin 6/18/24 7/24/24  Lindsey Owen PA-C   Blood Glucose Monitoring Suppl (OneTouch Verio) w/Device KIT Use 2 (two) times a day 4/18/24   Vivek Preston,    clonazePAM (KlonoPIN) 1 mg tablet TAKE ONE  TABLET BY MOUTH IN THE EVENING 10/1/24   Vivek Preston DO   cyanocobalamin (VITAMIN B-12) 1000 MCG tablet Take 1 tablet (1,000 mcg total) by mouth daily 6/24/24   Vivek Preston DO   Fluticasone-Salmeterol (Advair Diskus) 250-50 mcg/dose inhaler Inhale 1 puff 2 (two) times a day Rinse mouth after use. 6/24/24 9/22/24  Vivek Preston DO   Lancets (OneTouch Delica Plus [id number]) MISC TEST TWO TIMES A DAY 4/18/24   Vivek Preston DO   Misc. Devices (Carex Coccyx Cushion) MISC Use in the morning 5/24/23   Myrna Aguilar DNP   naloxone (NARCAN) 4 mg/0.1 mL nasal spray Administer 1 spray into a nostril. If no response after 2-3 minutes, give another dose in the other nostril using a new spray. 4/18/24 4/18/25  Vivek Preston DO   OneTouch Verio test strip  4/24/24   Historical Provider, MD   solifenacin (VESICARE) 5 mg tablet Take 1 tablet (5 mg total) by mouth daily 6/24/24   Vivek Preston DO   mupirocin (BACTROBAN) 2 % ointment Apply topically daily for 7 days To open wounds of RLE  Patient not taking: Reported on 7/24/2024 6/25/24 10/24/24  Lindsey Owen PA-C     Allergies   Allergen Reactions    Paroxetine Other (See Comments)     Became suicidal    Adhesive [Medical Tape] Itching and Blisters    Carafate [Sucralfate] Other (See Comments)     Mouth sores, tongue sore    Sulfa Antibiotics Hives and Rash    Sulfamethoxazole-Trimethoprim Hives       Objective :  Temp:  [98 °F (36.7 °C)-99.8 °F (37.7 °C)] 98 °F (36.7 °C)  HR:  [78-94] 78  BP: (102-127)/(52-69) 102/69  Resp:  [16-20] 17  SpO2:  [85 %-92 %] 90 %  O2 Device: Nasal cannula  Nasal Cannula O2 Flow Rate (L/min):  [3 L/min-4 L/min] 4 L/min    Physical Exam  Vitals and nursing note reviewed.   Constitutional:       General: She is not in acute distress.     Appearance: She is well-developed. She is obese.   HENT:      Head: Normocephalic and atraumatic.   Eyes:      Conjunctiva/sclera: Conjunctivae normal.   Cardiovascular:      Rate and  Rhythm: Normal rate and regular rhythm.      Heart sounds: No murmur heard.  Pulmonary:      Effort: No respiratory distress.      Breath sounds: Wheezing present.      Comments: Coarse breath sounds  Abdominal:      Palpations: Abdomen is soft.      Tenderness: There is no abdominal tenderness.   Musculoskeletal:         General: No swelling.      Cervical back: Neck supple.   Skin:     General: Skin is warm and dry.      Capillary Refill: Capillary refill takes less than 2 seconds.   Neurological:      Mental Status: She is alert. She is disoriented.   Psychiatric:         Mood and Affect: Mood normal.          Lines/Drains:            Lab Results: I have reviewed the following results:  Results from last 7 days   Lab Units 10/23/24  1930   WBC Thousand/uL 19.75*   HEMOGLOBIN g/dL 13.9   HEMATOCRIT % 44.1   PLATELETS Thousands/uL 245   SEGS PCT % 90*   LYMPHO PCT % 5*   MONO PCT % 5   EOS PCT % 0     Results from last 7 days   Lab Units 10/23/24  1930   SODIUM mmol/L 132*   POTASSIUM mmol/L 5.2   CHLORIDE mmol/L 97   CO2 mmol/L 28   BUN mg/dL 12   CREATININE mg/dL 0.73   ANION GAP mmol/L 7   CALCIUM mg/dL 10.4*   ALBUMIN g/dL 4.2   TOTAL BILIRUBIN mg/dL 0.38   ALK PHOS U/L 57   ALT U/L 8   AST U/L 18   GLUCOSE RANDOM mg/dL 141*     Results from last 7 days   Lab Units 10/23/24  1930   INR  1.11         Lab Results   Component Value Date    HGBA1C 7.6 (H) 06/08/2024    HGBA1C 6.0 (H) 02/28/2024    HGBA1C 6.5 (H) 11/07/2023     Results from last 7 days   Lab Units 10/23/24  2226 10/23/24  1930   LACTIC ACID mmol/L 2.1* 2.6*   PROCALCITONIN ng/ml  --  1.29*       Imaging Results Review: I reviewed radiology reports from this admission including: CT chest and CT head.  Other Study Results Review: EKG was reviewed.     Administrative Statements   I have spent a total time of 75 minutes in caring for this patient on the day of the visit/encounter including Diagnostic results, Prognosis, Risks and benefits of tx options,  Instructions for management, Patient and family education, Importance of tx compliance, Risk factor reductions, Impressions, Documenting in the medical record, and Reviewing / ordering tests, medicine, procedures  .    ** Please Note: This note has been constructed using a voice recognition system. **

## 2024-10-25 PROBLEM — G93.40 ACUTE ENCEPHALOPATHY: Status: ACTIVE | Noted: 2024-10-25

## 2024-10-25 LAB
ANION GAP SERPL CALCULATED.3IONS-SCNC: 6 MMOL/L (ref 4–13)
BASOPHILS # BLD AUTO: 0.03 THOUSANDS/ΜL (ref 0–0.1)
BASOPHILS NFR BLD AUTO: 0 % (ref 0–1)
BUN SERPL-MCNC: 9 MG/DL (ref 5–25)
CALCIUM SERPL-MCNC: 9.3 MG/DL (ref 8.4–10.2)
CHLORIDE SERPL-SCNC: 102 MMOL/L (ref 96–108)
CO2 SERPL-SCNC: 28 MMOL/L (ref 21–32)
CREAT SERPL-MCNC: 0.7 MG/DL (ref 0.6–1.3)
EOSINOPHIL # BLD AUTO: 0.41 THOUSAND/ΜL (ref 0–0.61)
EOSINOPHIL NFR BLD AUTO: 4 % (ref 0–6)
ERYTHROCYTE [DISTWIDTH] IN BLOOD BY AUTOMATED COUNT: 15.5 % (ref 11.6–15.1)
GFR SERPL CREATININE-BSD FRML MDRD: 83 ML/MIN/1.73SQ M
GLUCOSE SERPL-MCNC: 139 MG/DL (ref 65–140)
GLUCOSE SERPL-MCNC: 147 MG/DL (ref 65–140)
GLUCOSE SERPL-MCNC: 150 MG/DL (ref 65–140)
GLUCOSE SERPL-MCNC: 166 MG/DL (ref 65–140)
GLUCOSE SERPL-MCNC: 174 MG/DL (ref 65–140)
HCT VFR BLD AUTO: 39.1 % (ref 34.8–46.1)
HGB BLD-MCNC: 11.9 G/DL (ref 11.5–15.4)
IMM GRANULOCYTES # BLD AUTO: 0.04 THOUSAND/UL (ref 0–0.2)
IMM GRANULOCYTES NFR BLD AUTO: 0 % (ref 0–2)
L PNEUMO1 AG UR QL IA.RAPID: NEGATIVE
LYMPHOCYTES # BLD AUTO: 0.61 THOUSANDS/ΜL (ref 0.6–4.47)
LYMPHOCYTES NFR BLD AUTO: 5 % (ref 14–44)
MAGNESIUM SERPL-MCNC: 1.6 MG/DL (ref 1.9–2.7)
MCH RBC QN AUTO: 29.1 PG (ref 26.8–34.3)
MCHC RBC AUTO-ENTMCNC: 30.4 G/DL (ref 31.4–37.4)
MCV RBC AUTO: 96 FL (ref 82–98)
MONOCYTES # BLD AUTO: 0.72 THOUSAND/ΜL (ref 0.17–1.22)
MONOCYTES NFR BLD AUTO: 6 % (ref 4–12)
NEUTROPHILS # BLD AUTO: 10.05 THOUSANDS/ΜL (ref 1.85–7.62)
NEUTS SEG NFR BLD AUTO: 85 % (ref 43–75)
NRBC BLD AUTO-RTO: 0 /100 WBCS
PLATELET # BLD AUTO: 194 THOUSANDS/UL (ref 149–390)
PMV BLD AUTO: 10.1 FL (ref 8.9–12.7)
POTASSIUM SERPL-SCNC: 4.6 MMOL/L (ref 3.5–5.3)
RBC # BLD AUTO: 4.09 MILLION/UL (ref 3.81–5.12)
S PNEUM AG UR QL: NEGATIVE
SODIUM SERPL-SCNC: 136 MMOL/L (ref 135–147)
WBC # BLD AUTO: 11.86 THOUSAND/UL (ref 4.31–10.16)

## 2024-10-25 PROCEDURE — 80048 BASIC METABOLIC PNL TOTAL CA: CPT | Performed by: HOSPITALIST

## 2024-10-25 PROCEDURE — 94760 N-INVAS EAR/PLS OXIMETRY 1: CPT

## 2024-10-25 PROCEDURE — 99233 SBSQ HOSP IP/OBS HIGH 50: CPT | Performed by: NURSE PRACTITIONER

## 2024-10-25 PROCEDURE — 83735 ASSAY OF MAGNESIUM: CPT | Performed by: HOSPITALIST

## 2024-10-25 PROCEDURE — 82948 REAGENT STRIP/BLOOD GLUCOSE: CPT

## 2024-10-25 PROCEDURE — 85025 COMPLETE CBC W/AUTO DIFF WBC: CPT | Performed by: HOSPITALIST

## 2024-10-25 PROCEDURE — 94664 DEMO&/EVAL PT USE INHALER: CPT

## 2024-10-25 RX ORDER — MAGNESIUM SULFATE HEPTAHYDRATE 40 MG/ML
2 INJECTION, SOLUTION INTRAVENOUS ONCE
Status: COMPLETED | OUTPATIENT
Start: 2024-10-25 | End: 2024-10-25

## 2024-10-25 RX ADMIN — RIVAROXABAN 2.5 MG: 2.5 TABLET, FILM COATED ORAL at 08:20

## 2024-10-25 RX ADMIN — AMILORIDE HYDROCLORIDE 5 MG: 5 TABLET ORAL at 08:12

## 2024-10-25 RX ADMIN — CEFTRIAXONE 1000 MG: 1 INJECTION, SOLUTION INTRAVENOUS at 22:46

## 2024-10-25 RX ADMIN — RIVAROXABAN 2.5 MG: 2.5 TABLET, FILM COATED ORAL at 17:11

## 2024-10-25 RX ADMIN — PANTOPRAZOLE SODIUM 40 MG: 40 TABLET, DELAYED RELEASE ORAL at 08:13

## 2024-10-25 RX ADMIN — DOCUSATE SODIUM 100 MG: 100 CAPSULE, LIQUID FILLED ORAL at 17:11

## 2024-10-25 RX ADMIN — INSULIN LISPRO 1 UNITS: 100 INJECTION, SOLUTION INTRAVENOUS; SUBCUTANEOUS at 08:10

## 2024-10-25 RX ADMIN — MAGNESIUM SULFATE HEPTAHYDRATE 2 G: 40 INJECTION, SOLUTION INTRAVENOUS at 15:24

## 2024-10-25 RX ADMIN — ACETAMINOPHEN 975 MG: 325 TABLET ORAL at 05:03

## 2024-10-25 RX ADMIN — ASPIRIN 81 MG 81 MG: 81 TABLET ORAL at 08:12

## 2024-10-25 RX ADMIN — ALBUTEROL SULFATE 2 PUFF: 90 AEROSOL, METERED RESPIRATORY (INHALATION) at 19:32

## 2024-10-25 RX ADMIN — ACETAMINOPHEN 975 MG: 325 TABLET ORAL at 13:23

## 2024-10-25 RX ADMIN — INSULIN LISPRO 1 UNITS: 100 INJECTION, SOLUTION INTRAVENOUS; SUBCUTANEOUS at 21:09

## 2024-10-25 RX ADMIN — LISINOPRIL 5 MG: 5 TABLET ORAL at 08:13

## 2024-10-25 RX ADMIN — ATORVASTATIN CALCIUM 10 MG: 10 TABLET, FILM COATED ORAL at 17:11

## 2024-10-25 RX ADMIN — AZITHROMYCIN MONOHYDRATE 500 MG: 500 INJECTION, POWDER, LYOPHILIZED, FOR SOLUTION INTRAVENOUS at 23:16

## 2024-10-25 RX ADMIN — INSULIN LISPRO 1 UNITS: 100 INJECTION, SOLUTION INTRAVENOUS; SUBCUTANEOUS at 16:44

## 2024-10-25 RX ADMIN — OXYBUTYNIN CHLORIDE 5 MG: 5 TABLET, EXTENDED RELEASE ORAL at 08:13

## 2024-10-25 RX ADMIN — AZITHROMYCIN MONOHYDRATE 500 MG: 500 INJECTION, POWDER, LYOPHILIZED, FOR SOLUTION INTRAVENOUS at 00:14

## 2024-10-25 RX ADMIN — ALBUTEROL SULFATE 2 PUFF: 90 AEROSOL, METERED RESPIRATORY (INHALATION) at 08:20

## 2024-10-25 RX ADMIN — FLUTICASONE FUROATE AND VILANTEROL TRIFENATATE 1 PUFF: 200; 25 POWDER RESPIRATORY (INHALATION) at 08:20

## 2024-10-25 RX ADMIN — CLONAZEPAM 1 MG: 1 TABLET ORAL at 17:11

## 2024-10-25 RX ADMIN — POLYETHYLENE GLYCOL 3350 17 G: 17 POWDER, FOR SOLUTION ORAL at 08:12

## 2024-10-25 RX ADMIN — BUPROPION HYDROCHLORIDE 300 MG: 150 TABLET, EXTENDED RELEASE ORAL at 08:12

## 2024-10-25 RX ADMIN — PREGABALIN 200 MG: 100 CAPSULE ORAL at 08:12

## 2024-10-25 RX ADMIN — DOCUSATE SODIUM 100 MG: 100 CAPSULE, LIQUID FILLED ORAL at 08:13

## 2024-10-25 NOTE — ASSESSMENT & PLAN NOTE
Currently without exacerbation  Continue treatment in accordance with respiratory protocol  Of note, patient continues to smoke  Continue Fluticasone-vilanterol 200-25 micrograms 1 puff daily and albuterol inhaler 2 puffs as needed twice daily

## 2024-10-25 NOTE — ASSESSMENT & PLAN NOTE
Continue Rocephin, and Zithromax day 3  COVID-19, RSV, and influenza are negative  Blood cultures x 2: 1/2 positive for gram-positive cocci in clusters  Streptococcus antigen and Legionella antigen are both negative

## 2024-10-25 NOTE — PROGRESS NOTES
Progress Note - Hospitalist   Name: Lona Cedeno 79 y.o. female I MRN: [id number]  Unit/Bed#: -01 I Date of Admission: 10/23/2024   Date of Service: 10/25/2024 I Hospital Day: 2    Assessment & Plan  Sepsis with acute respiratory failure (HCC)  Met sepsis criteria with leukocytosis (WBC 19.75) tachycardia and right lower lobe pneumonia.  Sepsis parameters are improved  Sepsis parameters have improved  Patient demonstrated some confusion at time of arrival, now resolved.  This occurred in the setting of sepsis and hypoxia.  S/p IV fluids, since discontinued  Monitor labs and vital signs  Community acquired pneumonia of right lower lobe of lung  Continue Rocephin, and Zithromax day 3  COVID-19, RSV, and influenza are negative  Blood cultures x 2: 1/2 positive for gram-positive cocci in clusters  Streptococcus antigen and Legionella antigen are both negative  Acute respiratory failure with hypoxia (HCC)  O2 saturation on arrival was 85% on room air, likely secondary to pneumonia  Currently utilizing supplemental oxygen 4 L nasal cannula  Monitor oxygen requirements and wean oxygen as tolerated  Type 2 diabetes mellitus with diabetic polyneuropathy ([facility])  Lab Results   Component Value Date    HGBA1C 7.4 (H) 10/24/2024       Recent Labs     10/24/24  1606 10/24/24  2132 10/25/24  0655 10/25/24  1103   POCGLU 129 144* 166* 147*       Blood Sugar Average: Last 72 hrs:  (P) 146.5    Oral hypoglycemic medications are on hold  Continue Accu-Cheks before meals and at bedtime with sliding scale coverage  Essential hypertension  Blood pressure stable   Continue amiloride and lisinopril   Anxiety and depression  Continue prehospital Wellbutrin  mg p.o.   Continue clonazepam 1 mg in the evening  Mood currently stable  GERD without esophagitis  Continue Protonix 40 mg p.o. daily   PAD (peripheral artery disease) ([facility])  Continue aspirin, Lipitor, and Xarelto   COPD, severity to be determined ([facility])  Currently without  exacerbation  Continue treatment in accordance with respiratory protocol  Of note, patient continues to smoke  Continue Fluticasone-vilanterol 200-25 micrograms 1 puff daily and albuterol inhaler 2 puffs as needed twice daily    Tobacco use disorder  Nicotine patch offered  Cessation recommended  Hyponatremia  Probably hypovolemic hyponatremia  Daily BMP and trend sodium  Hypomagnesemia  Magnesium 1.6  Repleted, continue to monitor    VTE Pharmacologic Prophylaxis: VTE Score: 7 High Risk (Score >/= 5) - Pharmacological DVT Prophylaxis Ordered: rivaroxaban (Xarelto). Sequential Compression Devices Ordered.    Mobility:   Basic Mobility Inpatient Raw Score: 20  JH-HLM Goal: 6: Walk 10 steps or more  JH-HLM Achieved: 6: Walk 10 steps or more  JH-HLM Goal achieved. Continue to encourage appropriate mobility.    Patient Centered Rounds: I performed bedside rounds with nursing staff today.   Discussions with Specialists or Other Care Team Provider: CM    Education and Discussions with Family / Patient: Patient declined call to .     Current Length of Stay: 2 day(s)  Current Patient Status: Inpatient   Certification Statement: The patient will continue to require additional inpatient hospital stay due to sepsis and acute respiratory failure.  Discharge Plan: Anticipate discharge in 24-48 hrs to home.    Code Status: Level 3 - DNAR and DNI    Subjective   Patient evaluated at bedside.  She said she is feeling much better.  Mild shortness of breath.     Objective :  Temp:  [97.9 °F (36.6 °C)-98.6 °F (37 °C)] 97.9 °F (36.6 °C)  HR:  [76-83] 76  BP: (114-130)/(55-58) 130/55  Resp:  [17-20] 20  SpO2:  [92 %-94 %] 93 %  O2 Device: Nasal cannula  Nasal Cannula O2 Flow Rate (L/min):  [2 L/min-4 L/min] 2 L/min    Body mass index is 32.91 kg/m².     Input and Output Summary (last 24 hours):     Intake/Output Summary (Last 24 hours) at 10/25/2024 1524  Last data filed at 10/25/2024 1246  Gross per 24 hour   Intake 780  ml   Output 3300 ml   Net -2520 ml       Physical Exam  Vitals and nursing note reviewed.   Constitutional:       General: She is not in acute distress.  HENT:      Head: Normocephalic and atraumatic.      Mouth/Throat:      Pharynx: Oropharynx is clear.   Eyes:      Pupils: Pupils are equal, round, and reactive to light.   Cardiovascular:      Rate and Rhythm: Normal rate and regular rhythm.      Pulses: Normal pulses.      Heart sounds: Normal heart sounds.   Pulmonary:      Effort: Pulmonary effort is normal. No respiratory distress.      Breath sounds: Rhonchi present.   Abdominal:      General: Bowel sounds are normal.      Palpations: Abdomen is soft.      Tenderness: There is no abdominal tenderness.   Musculoskeletal:      Cervical back: Neck supple.      Right lower leg: No edema.      Left lower leg: No edema.   Skin:     General: Skin is warm and dry.      Capillary Refill: Capillary refill takes less than 2 seconds.   Neurological:      General: No focal deficit present.      Mental Status: She is alert.           Lab Results: I have reviewed the following results:   Results from last 7 days   Lab Units 10/25/24  0504 10/24/24  0440   WBC Thousand/uL 11.86* 15.46*   HEMOGLOBIN g/dL 11.9 12.4   HEMATOCRIT % 39.1 40.1   PLATELETS Thousands/uL 194 200   BANDS PCT %  --  14*   SEGS PCT % 85*  --    LYMPHO PCT % 5* 3*   MONO PCT % 6 5   EOS PCT % 4 2     Results from last 7 days   Lab Units 10/25/24  0504 10/24/24  0440 10/23/24  1930   SODIUM mmol/L 136   < > 132*   POTASSIUM mmol/L 4.6   < > 5.2   CHLORIDE mmol/L 102   < > 97   CO2 mmol/L 28   < > 28   BUN mg/dL 9   < > 12   CREATININE mg/dL 0.70   < > 0.73   ANION GAP mmol/L 6   < > 7   CALCIUM mg/dL 9.3   < > 10.4*   ALBUMIN g/dL  --   --  4.2   TOTAL BILIRUBIN mg/dL  --   --  0.38   ALK PHOS U/L  --   --  57   ALT U/L  --   --  8   AST U/L  --   --  18   GLUCOSE RANDOM mg/dL 139   < > 141*    < > = values in this interval not displayed.     Results from  last 7 days   Lab Units 10/23/24  1930   INR  1.11     Results from last 7 days   Lab Units 10/25/24  1103 10/25/24  0655 10/24/24  2132 10/24/24  1606   POC GLUCOSE mg/dl 147* 166* 144* 129     Results from last 7 days   Lab Units 10/24/24  1518   HEMOGLOBIN A1C % 7.4*     Results from last 7 days   Lab Units 10/24/24  0440 10/23/24  2226 10/23/24  1930   LACTIC ACID mmol/L  --  2.1* 2.6*   PROCALCITONIN ng/ml 1.26*  --  1.29*       Recent Cultures (last 7 days):   Results from last 7 days   Lab Units 10/24/24  2142 10/23/24  2009 10/23/24  1930   BLOOD CULTURE   --  No Growth at 24 hrs.  --    GRAM STAIN RESULT  No Polys or Bacteria seen  --  Gram positive cocci in clusters*   LEGIONELLA URINARY ANTIGEN  Negative  --   --        Last 24 Hours Medication List:     Current Facility-Administered Medications:     acetaminophen (TYLENOL) tablet 975 mg, Q8H JILLIAN    albuterol (PROVENTIL HFA,VENTOLIN HFA) inhaler 2 puff, BID    AMILoride tablet 5 mg, Daily    aspirin chewable tablet 81 mg, Daily    atorvastatin (LIPITOR) tablet 10 mg, Daily With Dinner    azithromycin (ZITHROMAX) 500 mg in sodium chloride 0.9 % 250 mL IVPB, Q24H, Last Rate: 500 mg (10/25/24 0014)    buPROPion (WELLBUTRIN XL) 24 hr tablet 300 mg, Daily    cefTRIAXone (ROCEPHIN) IVPB (premix in dextrose) 1,000 mg 50 mL, Q24H, Last Rate: 1,000 mg (10/24/24 2322)    clonazePAM (KlonoPIN) tablet 1 mg, QPM    docusate sodium (COLACE) capsule 100 mg, BID    fluticasone-vilanterol 200-25 mcg/actuation 1 puff, Daily    insulin lispro (HumALOG/ADMELOG) 100 units/mL subcutaneous injection 1-6 Units, TID AC **AND** Fingerstick Glucose (POCT), TID AC    insulin lispro (HumALOG/ADMELOG) 100 units/mL subcutaneous injection 1-6 Units, HS    lisinopril (ZESTRIL) tablet 5 mg, Daily    magnesium sulfate 2 g/50 mL IVPB (premix) 2 g, Once    oxybutynin (DITROPAN-XL) 24 hr tablet 5 mg, Daily    pantoprazole (PROTONIX) EC tablet 40 mg, Daily    polyethylene glycol (MIRALAX)  packet 17 g, Daily    pregabalin (LYRICA) capsule 200 mg, Daily    rivaroxaban (XARELTO) tablet 2.5 mg, BID    Administrative Statements   Today, Patient Was Seen By: ROXANE Murphy  I have spent a total time of 36 minutes in caring for this patient on the day of the visit/encounter including Prognosis, Instructions for management, Patient and family education, Counseling / Coordination of care, Documenting in the medical record, Reviewing / ordering tests, medicine, procedures  , Obtaining or reviewing history  , and Communicating with other healthcare professionals .    **Please Note: This note may have been constructed using a voice recognition system.**

## 2024-10-25 NOTE — CASE MANAGEMENT
Case Management Discharge Planning Note    Patient name Lona Cedeno  Location /-01 MRN [id number]  : 1945 Date 10/25/2024       Current Admission Date: 10/23/2024  Current Admission Diagnosis:Sepsis with acute respiratory failure (HCC)   Patient Active Problem List    Diagnosis Date Noted Date Diagnosed    Acute encephalopathy 10/25/2024     Hyponatremia 10/24/2024     Community acquired pneumonia of right lower lobe of lung 2024     Open wound of right foot 2024     Hyperkalemia 2024     Acute metabolic encephalopathy 2024     Toxic encephalopathy 2024     Acute respiratory failure with hypoxia (HCC) 2024     Sacral wound 2024     Other dietary vitamin B12 deficiency anemia 2024     Leukocytosis 2024     Sepsis with acute respiratory failure (HCC) 2024     At risk for venous thromboembolism (VTE) 2024     At high risk for skin breakdown 2024     Wound of skin 2024     Impaired mobility and activities of daily living 2024     Anemia 2024     Constipation 2024     Age-related osteoporosis without current pathological fracture 2023     Abnormal CT of the chest 10/17/2023     COPD, severity to be determined ([facility]) 10/17/2023     Tobacco use disorder 10/17/2023     PAD (peripheral artery disease) ([facility]) 10/10/2023     Bladder neoplasm 2023     Left renal mass 2023     Hypomagnesemia 2023     Degenerative lumbar disc 2023     Urinary incontinence 2023     GERD without esophagitis 10/16/2019     Essential hypertension 02/15/2019     Anxiety and depression 02/15/2019     Type 2 diabetes mellitus with diabetic polyneuropathy (HCC) 2019       LOS (days): 2  Geometric Mean LOS (GMLOS) (days): 4.9  Days to GMLOS:3     OBJECTIVE:  Risk of Unplanned Readmission Score: 33.39         Current admission status: Inpatient   Preferred Pharmacy:   [id number] -  [place] - [address] [place]  [address] [facility] [address] [facility] [place] [address]  Phone: [phone] Fax: [phone]    [facility] [place], [place] - [address]  [address] [place] [address]  Phone: [phone] Fax: [phone]    [facility] - [place], [place] - [address]  [address]  [place] [address]  Phone: [phone] Fax: [phone]    Primary Care Provider: Vivek Preston DO    Primary Insurance: [facility] REP  Secondary Insurance:     DISCHARGE DETAILS:    Discharge planning discussed with:: patient & spouse was called at 10:35 am  Freedom of Choice: Yes     CM contacted family/caregiver?: Yes             Contacts  Patient Contacts: Carmelo Gregg  Relationship to Patient:: Family (spouse)  Contact Method: Phone  Phone Number: [phone]  Reason/Outcome: Discharge Planning    Requested Home Health Care         Is the patient interested in HHC at discharge?: No    DME Referral Provided  Referral made for DME?: No (pt does not have home oxygen or a nebulizer)    Other Referral/Resources/Interventions Provided:  Interventions: Other (Specify)  Referral Comments: pt remains on oxygen & Iv antibiotics, pt does not have home oxygen - cm gave [facility] pt inforamtion on getting a discount on her xarelto- pt may need a ride home- her daughter woulld drive her home but she is out of town until Sunday -    Would you like to participate in our [facility] service program?  : No - Declined    Treatment Team Recommendation: Home (home with spouse & outpt follow up- TBD)                                         Family notified:: spouse was called

## 2024-10-25 NOTE — PLAN OF CARE
Problem: Prexisting or High Potential for Compromised Skin Integrity  Goal: Skin integrity is maintained or improved  Description: INTERVENTIONS:  - Identify patients at risk for skin breakdown  - Assess and monitor skin integrity  - Assess and monitor nutrition and hydration status  - Monitor labs   - Assess for incontinence   - Turn and reposition patient  - Assist with mobility/ambulation  - Relieve pressure over bony prominences  - Avoid friction and shearing  - Provide appropriate hygiene as needed including keeping skin clean and dry  - Evaluate need for skin moisturizer/barrier cream  - Collaborate with interdisciplinary team   - Patient/family teaching  - Consider wound care consult   Outcome: Progressing     Problem: PAIN - ADULT  Goal: Verbalizes/displays adequate comfort level or baseline comfort level  Description: Interventions:  - Encourage patient to monitor pain and request assistance  - Assess pain using appropriate pain scale  - Administer analgesics based on type and severity of pain and evaluate response  - Implement non-pharmacological measures as appropriate and evaluate response  - Consider cultural and social influences on pain and pain management  - Notify physician/advanced practitioner if interventions unsuccessful or patient reports new pain  Outcome: Progressing     Problem: INFECTION - ADULT  Goal: Absence or prevention of progression during hospitalization  Description: INTERVENTIONS:  - Assess and monitor for signs and symptoms of infection  - Monitor lab/diagnostic results  - Monitor all insertion sites, i.e. indwelling lines, tubes, and drains  - Monitor endotracheal if appropriate and nasal secretions for changes in amount and color  - [place] appropriate cooling/warming therapies per order  - Administer medications as ordered  - Instruct and encourage patient and family to use good hand hygiene technique  - Identify and instruct in appropriate isolation precautions for  identified infection/condition  Outcome: Progressing     Problem: SAFETY ADULT  Goal: Patient will remain free of falls  Description: INTERVENTIONS:  - Educate patient/family on patient safety including physical limitations  - Instruct patient to call for assistance with activity   - Consult OT/PT to assist with strengthening/mobility   - Keep Call bell within reach  - Keep bed low and locked with side rails adjusted as appropriate  - Keep care items and personal belongings within reach  - Initiate and maintain comfort rounds  - Make Fall Risk Sign visible to staff  - Offer Toileting every 2 Hours, in advance of need  - Initiate/Maintain bed alarm  - Apply yellow socks and bracelet for high fall risk patients  - Consider moving patient to room near nurses station  Outcome: Progressing     Problem: RESPIRATORY - ADULT  Goal: Achieves optimal ventilation and oxygenation  Description: INTERVENTIONS:  - Assess for changes in respiratory status  - Assess for changes in mentation and behavior  - Position to facilitate oxygenation and minimize respiratory effort  - Oxygen administered by appropriate delivery if ordered  - Initiate smoking cessation education as indicated  - Encourage broncho-pulmonary hygiene including cough, deep breathe, Incentive Spirometry  - Assess the need for suctioning and aspirate as needed  - Assess and instruct to report SOB or any respiratory difficulty  - Respiratory Therapy support as indicated  Outcome: Progressing     Problem: Knowledge Deficit  Goal: Patient/family/caregiver demonstrates understanding of disease process, treatment plan, medications, and discharge instructions  Description: Complete learning assessment and assess knowledge base.  Interventions:  - Provide teaching at level of understanding  - Provide teaching via preferred learning methods  Outcome: Progressing     Problem: DISCHARGE PLANNING  Goal: Discharge to home or other facility with appropriate  resources  Description: INTERVENTIONS:  - Identify barriers to discharge w/patient and caregiver  - Arrange for needed discharge resources and transportation as appropriate  - Identify discharge learning needs (meds, wound care, etc.)  - Arrange for interpretive services to assist at discharge as needed  - Refer to Case Management Department for coordinating discharge planning if the patient needs post-hospital services based on physician/advanced practitioner order or complex needs related to functional status, cognitive ability, or social support system  Outcome: Progressing

## 2024-10-25 NOTE — ASSESSMENT & PLAN NOTE
Lab Results   Component Value Date    HGBA1C 7.4 (H) 10/24/2024       Recent Labs     10/24/24  1606 10/24/24  2132 10/25/24  0655 10/25/24  1103   POCGLU 129 144* 166* 147*       Blood Sugar Average: Last 72 hrs:  (P) 146.5  Oral hypoglycemic medications are on hold  Target blood sugar for the hospital is 140-180  Implement Accu-Cheks before meals and at bedtime and according sliding scale coverage

## 2024-10-25 NOTE — ASSESSMENT & PLAN NOTE
O2 saturation on arrival was 85% on room air, likely secondary to pneumonia  Currently utilizing supplemental oxygen 4 L nasal cannula  Monitor oxygen requirements and wean oxygen as tolerated

## 2024-10-25 NOTE — ASSESSMENT & PLAN NOTE
Lab Results   Component Value Date    HGBA1C 7.4 (H) 10/24/2024       Recent Labs     10/24/24  1606 10/24/24  2132 10/25/24  0655 10/25/24  1103   POCGLU 129 144* 166* 147*       Blood Sugar Average: Last 72 hrs:  (P) 146.5    Oral hypoglycemic medications are on hold  Continue Accu-Cheks before meals and at bedtime with sliding scale coverage

## 2024-10-25 NOTE — ASSESSMENT & PLAN NOTE
Met sepsis criteria with leukocytosis (WBC 19.75) tachycardia and right lower lobe pneumonia.  Sepsis parameters are improved  Sepsis parameters have improved  Patient demonstrated some confusion at time of arrival, now resolved.  This occurred in the setting of sepsis and hypoxia.  S/p IV fluids, since discontinued  Monitor labs and vital signs

## 2024-10-25 NOTE — ASSESSMENT & PLAN NOTE
Presented with confusion in the setting of hypoxia/sepsis.  Now resolved  CT head negative for acute intracranial findings

## 2024-10-26 LAB
ANION GAP SERPL CALCULATED.3IONS-SCNC: 6 MMOL/L (ref 4–13)
BASOPHILS # BLD AUTO: 0.02 THOUSANDS/ΜL (ref 0–0.1)
BASOPHILS NFR BLD AUTO: 0 % (ref 0–1)
BUN SERPL-MCNC: 8 MG/DL (ref 5–25)
CALCIUM SERPL-MCNC: 9.7 MG/DL (ref 8.4–10.2)
CHLORIDE SERPL-SCNC: 101 MMOL/L (ref 96–108)
CO2 SERPL-SCNC: 30 MMOL/L (ref 21–32)
CREAT SERPL-MCNC: 0.56 MG/DL (ref 0.6–1.3)
EOSINOPHIL # BLD AUTO: 0.44 THOUSAND/ΜL (ref 0–0.61)
EOSINOPHIL NFR BLD AUTO: 5 % (ref 0–6)
ERYTHROCYTE [DISTWIDTH] IN BLOOD BY AUTOMATED COUNT: 15.3 % (ref 11.6–15.1)
GFR SERPL CREATININE-BSD FRML MDRD: 89 ML/MIN/1.73SQ M
GLUCOSE SERPL-MCNC: 133 MG/DL (ref 65–140)
GLUCOSE SERPL-MCNC: 141 MG/DL (ref 65–140)
GLUCOSE SERPL-MCNC: 153 MG/DL (ref 65–140)
GLUCOSE SERPL-MCNC: 157 MG/DL (ref 65–140)
GLUCOSE SERPL-MCNC: 190 MG/DL (ref 65–140)
HCT VFR BLD AUTO: 38.3 % (ref 34.8–46.1)
HGB BLD-MCNC: 11.8 G/DL (ref 11.5–15.4)
IMM GRANULOCYTES # BLD AUTO: 0.04 THOUSAND/UL (ref 0–0.2)
IMM GRANULOCYTES NFR BLD AUTO: 1 % (ref 0–2)
LYMPHOCYTES # BLD AUTO: 0.59 THOUSANDS/ΜL (ref 0.6–4.47)
LYMPHOCYTES NFR BLD AUTO: 7 % (ref 14–44)
MCH RBC QN AUTO: 29.1 PG (ref 26.8–34.3)
MCHC RBC AUTO-ENTMCNC: 30.8 G/DL (ref 31.4–37.4)
MCV RBC AUTO: 94 FL (ref 82–98)
MONOCYTES # BLD AUTO: 0.69 THOUSAND/ΜL (ref 0.17–1.22)
MONOCYTES NFR BLD AUTO: 8 % (ref 4–12)
NEUTROPHILS # BLD AUTO: 6.89 THOUSANDS/ΜL (ref 1.85–7.62)
NEUTS SEG NFR BLD AUTO: 79 % (ref 43–75)
NRBC BLD AUTO-RTO: 0 /100 WBCS
PLATELET # BLD AUTO: 204 THOUSANDS/UL (ref 149–390)
PMV BLD AUTO: 10.5 FL (ref 8.9–12.7)
POTASSIUM SERPL-SCNC: 4.7 MMOL/L (ref 3.5–5.3)
RBC # BLD AUTO: 4.06 MILLION/UL (ref 3.81–5.12)
SODIUM SERPL-SCNC: 137 MMOL/L (ref 135–147)
WBC # BLD AUTO: 8.67 THOUSAND/UL (ref 4.31–10.16)

## 2024-10-26 PROCEDURE — 94761 N-INVAS EAR/PLS OXIMETRY MLT: CPT

## 2024-10-26 PROCEDURE — 80048 BASIC METABOLIC PNL TOTAL CA: CPT | Performed by: NURSE PRACTITIONER

## 2024-10-26 PROCEDURE — 94664 DEMO&/EVAL PT USE INHALER: CPT

## 2024-10-26 PROCEDURE — 85025 COMPLETE CBC W/AUTO DIFF WBC: CPT | Performed by: NURSE PRACTITIONER

## 2024-10-26 PROCEDURE — 99233 SBSQ HOSP IP/OBS HIGH 50: CPT | Performed by: NURSE PRACTITIONER

## 2024-10-26 PROCEDURE — 82948 REAGENT STRIP/BLOOD GLUCOSE: CPT

## 2024-10-26 PROCEDURE — 94760 N-INVAS EAR/PLS OXIMETRY 1: CPT

## 2024-10-26 RX ORDER — LANOLIN ALCOHOL/MO/W.PET/CERES
3 CREAM (GRAM) TOPICAL
Status: DISCONTINUED | OUTPATIENT
Start: 2024-10-26 | End: 2024-10-28 | Stop reason: HOSPADM

## 2024-10-26 RX ADMIN — ACETAMINOPHEN 975 MG: 325 TABLET ORAL at 05:08

## 2024-10-26 RX ADMIN — PANTOPRAZOLE SODIUM 40 MG: 40 TABLET, DELAYED RELEASE ORAL at 08:58

## 2024-10-26 RX ADMIN — CLONAZEPAM 1 MG: 1 TABLET ORAL at 17:57

## 2024-10-26 RX ADMIN — DOCUSATE SODIUM 100 MG: 100 CAPSULE, LIQUID FILLED ORAL at 17:57

## 2024-10-26 RX ADMIN — AMILORIDE HYDROCLORIDE 5 MG: 5 TABLET ORAL at 08:56

## 2024-10-26 RX ADMIN — FLUTICASONE FUROATE AND VILANTEROL TRIFENATATE 1 PUFF: 200; 25 POWDER RESPIRATORY (INHALATION) at 07:39

## 2024-10-26 RX ADMIN — ACETAMINOPHEN 975 MG: 325 TABLET ORAL at 21:34

## 2024-10-26 RX ADMIN — BUPROPION HYDROCHLORIDE 300 MG: 150 TABLET, EXTENDED RELEASE ORAL at 08:58

## 2024-10-26 RX ADMIN — POLYETHYLENE GLYCOL 3350 17 G: 17 POWDER, FOR SOLUTION ORAL at 08:58

## 2024-10-26 RX ADMIN — ALBUTEROL SULFATE 2 PUFF: 90 AEROSOL, METERED RESPIRATORY (INHALATION) at 20:15

## 2024-10-26 RX ADMIN — ASPIRIN 81 MG 81 MG: 81 TABLET ORAL at 08:58

## 2024-10-26 RX ADMIN — AZITHROMYCIN MONOHYDRATE 500 MG: 500 INJECTION, POWDER, LYOPHILIZED, FOR SOLUTION INTRAVENOUS at 23:33

## 2024-10-26 RX ADMIN — Medication 3 MG: at 21:34

## 2024-10-26 RX ADMIN — DOCUSATE SODIUM 100 MG: 100 CAPSULE, LIQUID FILLED ORAL at 08:58

## 2024-10-26 RX ADMIN — ACETAMINOPHEN 975 MG: 325 TABLET ORAL at 14:22

## 2024-10-26 RX ADMIN — INSULIN LISPRO 1 UNITS: 100 INJECTION, SOLUTION INTRAVENOUS; SUBCUTANEOUS at 21:34

## 2024-10-26 RX ADMIN — ATORVASTATIN CALCIUM 10 MG: 10 TABLET, FILM COATED ORAL at 16:26

## 2024-10-26 RX ADMIN — PREGABALIN 200 MG: 100 CAPSULE ORAL at 08:58

## 2024-10-26 RX ADMIN — RIVAROXABAN 2.5 MG: 2.5 TABLET, FILM COATED ORAL at 17:57

## 2024-10-26 RX ADMIN — OXYBUTYNIN CHLORIDE 5 MG: 5 TABLET, EXTENDED RELEASE ORAL at 08:57

## 2024-10-26 RX ADMIN — RIVAROXABAN 2.5 MG: 2.5 TABLET, FILM COATED ORAL at 09:06

## 2024-10-26 RX ADMIN — INSULIN LISPRO 2 UNITS: 100 INJECTION, SOLUTION INTRAVENOUS; SUBCUTANEOUS at 16:24

## 2024-10-26 RX ADMIN — ALBUTEROL SULFATE 2 PUFF: 90 AEROSOL, METERED RESPIRATORY (INHALATION) at 07:39

## 2024-10-26 RX ADMIN — CEFTRIAXONE 1000 MG: 1 INJECTION, SOLUTION INTRAVENOUS at 22:42

## 2024-10-26 NOTE — ASSESSMENT & PLAN NOTE
Lab Results   Component Value Date    HGBA1C 7.4 (H) 10/24/2024       Recent Labs     10/25/24  2040 10/26/24  0641 10/26/24  1155 10/26/24  1623   POCGLU 150* 141* 133 190*       Blood Sugar Average: Last 72 hrs:  (P) 152.7319976444571030    Oral hypoglycemic medications are on hold  Continue Accu-Cheks before meals and at bedtime with sliding scale coverage

## 2024-10-26 NOTE — ASSESSMENT & PLAN NOTE
Currently without exacerbation  Continue treatment per respiratory respiratory protocol  Of note, patient continues to smoke  Continue Fluticasone-vilanterol 200-25 micrograms 1 puff daily and albuterol inhaler 2 puffs as needed twice daily

## 2024-10-26 NOTE — NURSING NOTE
Patient keeps taking her oxygen off and stated her reason is because it bothers her nose. Her oxygen level was 84 without it. Oxygen back on patient at this time

## 2024-10-26 NOTE — ASSESSMENT & PLAN NOTE
Continue Rocephin, and Zithromax day 3  COVID-19, RSV, and influenza are negative  Blood cultures x 2: 1/2 positive for gram-positive cocci in clusters, 1/2 no growth x 48 hours  Streptococcus antigen and Legionella antigen are both negative

## 2024-10-26 NOTE — RESPIRATORY THERAPY NOTE
Home Oxygen Qualifying Test     Patient name: Lona Cedeno        : 1945   Date of Test:  2024  Diagnosis: COPD, pneumonia   Home Oxygen Test:    **Medicare Guidelines require item(s) 1-5 on all ambulatory patients or 1 and 2 on non-ambulatory patients.    1. Baseline SPO2 on Room Air at rest 86 %   If <= 88% on Room Air add O2 via NC to obtain SpO2 >=88%. If LPM needed, document LPM 2 needed to reach =>88%    SPO2 during exertion on Room Air N/A  %  During exertion monitor SPO2. If SPO2 increases >=89%, do not add supplemental oxygen    SPO2 on Oxygen at Rest 93 % at 2 LPM    SPO2 during exertion on Oxygen 92 % at 2 LPM    Test performed during exertion activity.      [x]  Supplemental Home Oxygen is indicated.    []  Client does not qualify for home oxygen.    Respiratory Additional Notes- Patient denies dyspnea with ambulation.    Allyson Yeung, RRT

## 2024-10-26 NOTE — ASSESSMENT & PLAN NOTE
Met sepsis criteria with leukocytosis (WBC 19.75) tachycardia and right lower lobe pneumonia.  Sepsis parameters are improved  Patient demonstrated some confusion at time of arrival, now resolved.  This occurred in the setting of sepsis and hypoxia.  S/p IV fluids, since discontinued  Monitor labs and vital signs

## 2024-10-26 NOTE — PLAN OF CARE
Problem: INFECTION - ADULT  Goal: Absence or prevention of progression during hospitalization  Description: INTERVENTIONS:  - Assess and monitor for signs and symptoms of infection  - Monitor lab/diagnostic results  - Monitor all insertion sites, i.e. indwelling lines, tubes, and drains  - Monitor endotracheal if appropriate and nasal secretions for changes in amount and color  - [place] appropriate cooling/warming therapies per order  - Administer medications as ordered  - Instruct and encourage patient and family to use good hand hygiene technique  - Identify and instruct in appropriate isolation precautions for identified infection/condition  Outcome: Progressing     Problem: RESPIRATORY - ADULT  Goal: Achieves optimal ventilation and oxygenation  Description: INTERVENTIONS:  - Assess for changes in respiratory status  - Assess for changes in mentation and behavior  - Position to facilitate oxygenation and minimize respiratory effort  - Oxygen administered by appropriate delivery if ordered  - Initiate smoking cessation education as indicated  - Encourage broncho-pulmonary hygiene including cough, deep breathe, Incentive Spirometry  - Assess the need for suctioning and aspirate as needed  - Assess and instruct to report SOB or any respiratory difficulty  - Respiratory Therapy support as indicated  Wean O2 as tolerated  Outcome: Progressing

## 2024-10-26 NOTE — PROGRESS NOTES
Progress Note - Hospitalist   Name: Lona Cedeno 79 y.o. female I MRN: [id number]  Unit/Bed#: -01 I Date of Admission: 10/23/2024   Date of Service: 10/26/2024 I Hospital Day: 3    Assessment & Plan  Sepsis with acute respiratory failure (HCC)  Met sepsis criteria with leukocytosis (WBC 19.75) tachycardia and right lower lobe pneumonia.  Sepsis parameters are improved  Patient demonstrated some confusion at time of arrival, now resolved.  This occurred in the setting of sepsis and hypoxia.  S/p IV fluids, since discontinued  Monitor labs and vital signs  Community acquired pneumonia of right lower lobe of lung  Continue Rocephin, and Zithromax day 3  COVID-19, RSV, and influenza are negative  Blood cultures x 2: 1/2 positive for gram-positive cocci in clusters, 1/2 no growth x 48 hours  Streptococcus antigen and Legionella antigen are both negative  Acute respiratory failure with hypoxia ([facility])  O2 saturation on arrival was 85% on room air, likely secondary to pneumonia  Currently utilizing supplemental oxygen 2 L nasal cannula  Monitor oxygen requirements and wean oxygen as tolerated  Type 2 diabetes mellitus with diabetic polyneuropathy ([facility])  Lab Results   Component Value Date    HGBA1C 7.4 (H) 10/24/2024       Recent Labs     10/25/24  2040 10/26/24  0641 10/26/24  1155 10/26/24  1623   POCGLU 150* 141* 133 190*       Blood Sugar Average: Last 72 hrs:  (P) 152.4625563252927206    Oral hypoglycemic medications are on hold  Continue Accu-Cheks before meals and at bedtime with sliding scale coverage  Essential hypertension  Blood pressure stable   Continue amiloride and lisinopril   Anxiety and depression  Continue prehospital Wellbutrin  mg p.o.   Continue clonazepam 1 mg in the evening  Mood currently stable  GERD without esophagitis  Continue Protonix 40 mg p.o. daily   PAD (peripheral artery disease) ([facility])  Continue aspirin, Lipitor, and Xarelto   COPD, severity to be determined ([facility])  Currently  without exacerbation  Continue treatment per respiratory respiratory protocol  Of note, patient continues to smoke  Continue Fluticasone-vilanterol 200-25 micrograms 1 puff daily and albuterol inhaler 2 puffs as needed twice daily    Tobacco use disorder  Nicotine patch offered  Cessation recommended  Hyponatremia  Probably hypovolemic hyponatremia  Daily BMP and trend sodium  Hypomagnesemia  Magnesium 1.6 and repleted  Continue to monitor  Acute encephalopathy  Presented with confusion in the setting of hypoxia/sepsis.  Now resolved  CT head negative for acute intracranial findings    VTE Pharmacologic Prophylaxis: VTE Score: 7 High Risk (Score >/= 5) - Pharmacological DVT Prophylaxis Ordered: rivaroxaban (Xarelto). Sequential Compression Devices Ordered.    Mobility:   Basic Mobility Inpatient Raw Score: 20  JH-HLM Goal: 6: Walk 10 steps or more  JH-HLM Achieved: 3: Sit at edge of bed  JH-HLM Goal NOT achieved. Continue with multidisciplinary rounding and encourage appropriate mobility to improve upon JH-HLM goals.    Patient Centered Rounds: I performed bedside rounds with nursing staff today.     Education and Discussions with Family / Patient: Patient declined call to .     Current Length of Stay: 3 day(s)  Current Patient Status: Inpatient   Certification Statement: The patient will continue to require additional inpatient hospital stay due to pneumonia.  Discharge Plan: Anticipate discharge tomorrow to home.    Code Status: Level 3 - DNAR and DNI    Subjective   Patient seen and examined.  She said she is feeling better but did not sleep well last night.  She is hoping to go home soon.     Objective :  Temp:  [98.1 °F (36.7 °C)-98.2 °F (36.8 °C)] 98.2 °F (36.8 °C)  HR:  [67-74] 67  BP: (104-127)/(43-55) 105/43  Resp:  [17] 17  SpO2:  [85 %-95 %] 93 %  O2 Device: Nasal cannula  Nasal Cannula O2 Flow Rate (L/min):  [1 L/min-2 L/min] 1 L/min    Body mass index is 32.91 kg/m².     Input and Output  Summary (last 24 hours):     Intake/Output Summary (Last 24 hours) at 10/26/2024 1630  Last data filed at 10/26/2024 0745  Gross per 24 hour   Intake 240 ml   Output --   Net 240 ml       Physical Exam  Vitals and nursing note reviewed.   Constitutional:       General: She is not in acute distress.  HENT:      Head: Normocephalic and atraumatic.      Nose: Nose normal.      Mouth/Throat:      Mouth: Mucous membranes are moist.      Pharynx: Oropharynx is clear.   Eyes:      Pupils: Pupils are equal, round, and reactive to light.   Cardiovascular:      Rate and Rhythm: Normal rate and regular rhythm.   Pulmonary:      Effort: Pulmonary effort is normal. No respiratory distress.      Breath sounds: Rhonchi present.      Comments: Rhonchi right lung improved today, less audible  Abdominal:      General: Bowel sounds are normal.      Palpations: Abdomen is soft.      Tenderness: There is no abdominal tenderness.   Musculoskeletal:      Cervical back: Neck supple.      Right lower leg: No edema.      Left lower leg: No edema.   Skin:     General: Skin is warm and dry.      Capillary Refill: Capillary refill takes less than 2 seconds.   Neurological:      General: No focal deficit present.      Mental Status: She is alert.                   Lab Results: I have reviewed the following results:   Results from last 7 days   Lab Units 10/26/24  0423 10/25/24  0504 10/24/24  0440   WBC Thousand/uL 8.67   < > 15.46*   HEMOGLOBIN g/dL 11.8   < > 12.4   HEMATOCRIT % 38.3   < > 40.1   PLATELETS Thousands/uL 204   < > 200   BANDS PCT %  --   --  14*   SEGS PCT % 79*   < >  --    LYMPHO PCT % 7*   < > 3*   MONO PCT % 8   < > 5   EOS PCT % 5   < > 2    < > = values in this interval not displayed.     Results from last 7 days   Lab Units 10/26/24  0423 10/24/24  0440 10/23/24  1930   SODIUM mmol/L 137   < > 132*   POTASSIUM mmol/L 4.7   < > 5.2   CHLORIDE mmol/L 101   < > 97   CO2 mmol/L 30   < > 28   BUN mg/dL 8   < > 12   CREATININE  mg/dL 0.56*   < > 0.73   ANION GAP mmol/L 6   < > 7   CALCIUM mg/dL 9.7   < > 10.4*   ALBUMIN g/dL  --   --  4.2   TOTAL BILIRUBIN mg/dL  --   --  0.38   ALK PHOS U/L  --   --  57   ALT U/L  --   --  8   AST U/L  --   --  18   GLUCOSE RANDOM mg/dL 153*   < > 141*    < > = values in this interval not displayed.     Results from last 7 days   Lab Units 10/23/24  1930   INR  1.11     Results from last 7 days   Lab Units 10/26/24  1623 10/26/24  1155 10/26/24  0641 10/25/24  2040 10/25/24  1607 10/25/24  1103 10/25/24  0655 10/24/24  2132 10/24/24  1606   POC GLUCOSE mg/dl 190* 133 141* 150* 174* 147* 166* 144* 129     Results from last 7 days   Lab Units 10/24/24  1518   HEMOGLOBIN A1C % 7.4*     Results from last 7 days   Lab Units 10/24/24  0440 10/23/24  2226 10/23/24  1930   LACTIC ACID mmol/L  --  2.1* 2.6*   PROCALCITONIN ng/ml 1.26*  --  1.29*       Recent Cultures (last 7 days):   Results from last 7 days   Lab Units 10/24/24  2142 10/23/24  2009 10/23/24  1930   BLOOD CULTURE   --  No Growth at 48 hrs.  --    SPUTUM CULTURE  Culture too young- will reincubate  --   --    GRAM STAIN RESULT  No Polys or Bacteria seen  --  Gram positive cocci in clusters*   LEGIONELLA URINARY ANTIGEN  Negative  --   --      Last 24 Hours Medication List:     Current Facility-Administered Medications:     acetaminophen (TYLENOL) tablet 975 mg, Q8H JILLIAN    albuterol (PROVENTIL HFA,VENTOLIN HFA) inhaler 2 puff, BID    AMILoride tablet 5 mg, Daily    aspirin chewable tablet 81 mg, Daily    atorvastatin (LIPITOR) tablet 10 mg, Daily With Dinner    azithromycin (ZITHROMAX) 500 mg in sodium chloride 0.9 % 250 mL IVPB, Q24H, Last Rate: 500 mg (10/25/24 2316)    buPROPion (WELLBUTRIN XL) 24 hr tablet 300 mg, Daily    cefTRIAXone (ROCEPHIN) IVPB (premix in dextrose) 1,000 mg 50 mL, Q24H, Last Rate: 1,000 mg (10/25/24 2246)    clonazePAM (KlonoPIN) tablet 1 mg, QPM    docusate sodium (COLACE) capsule 100 mg, BID    fluticasone-vilanterol  200-25 mcg/actuation 1 puff, Daily    insulin lispro (HumALOG/ADMELOG) 100 units/mL subcutaneous injection 1-6 Units, TID AC **AND** Fingerstick Glucose (POCT), TID AC    insulin lispro (HumALOG/ADMELOG) 100 units/mL subcutaneous injection 1-6 Units, HS    lisinopril (ZESTRIL) tablet 5 mg, Daily    oxybutynin (DITROPAN-XL) 24 hr tablet 5 mg, Daily    pantoprazole (PROTONIX) EC tablet 40 mg, Daily    polyethylene glycol (MIRALAX) packet 17 g, Daily    pregabalin (LYRICA) capsule 200 mg, Daily    rivaroxaban (XARELTO) tablet 2.5 mg, BID    Administrative Statements   Today, Patient Was Seen By: ROXANE Murphy  I have spent a total time of 38 minutes in caring for this patient on the day of the visit/encounter including Diagnostic results, Instructions for management, Patient and family education, Importance of tx compliance, Risk factor reductions, Impressions, Counseling / Coordination of care, Documenting in the medical record, Reviewing / ordering tests, medicine, procedures  , Obtaining or reviewing history  , and Communicating with other healthcare professionals .    **Please Note: This note may have been constructed using a voice recognition system.**

## 2024-10-26 NOTE — ASSESSMENT & PLAN NOTE
O2 saturation on arrival was 85% on room air, likely secondary to pneumonia  Currently utilizing supplemental oxygen 2 L nasal cannula  Monitor oxygen requirements and wean oxygen as tolerated

## 2024-10-27 LAB
ALL TARGETS: NOT DETECTED
ANION GAP SERPL CALCULATED.3IONS-SCNC: 6 MMOL/L (ref 4–13)
BACTERIA BLD CULT: ABNORMAL
BACTERIA SPT RESP CULT: NORMAL
BASOPHILS # BLD AUTO: 0.04 THOUSANDS/ΜL (ref 0–0.1)
BASOPHILS NFR BLD AUTO: 1 % (ref 0–1)
BUN SERPL-MCNC: 11 MG/DL (ref 5–25)
CALCIUM SERPL-MCNC: 10 MG/DL (ref 8.4–10.2)
CHLORIDE SERPL-SCNC: 101 MMOL/L (ref 96–108)
CO2 SERPL-SCNC: 30 MMOL/L (ref 21–32)
CREAT SERPL-MCNC: 0.55 MG/DL (ref 0.6–1.3)
EOSINOPHIL # BLD AUTO: 0.63 THOUSAND/ΜL (ref 0–0.61)
EOSINOPHIL NFR BLD AUTO: 11 % (ref 0–6)
ERYTHROCYTE [DISTWIDTH] IN BLOOD BY AUTOMATED COUNT: 14.7 % (ref 11.6–15.1)
GFR SERPL CREATININE-BSD FRML MDRD: 90 ML/MIN/1.73SQ M
GLUCOSE SERPL-MCNC: 147 MG/DL (ref 65–140)
GLUCOSE SERPL-MCNC: 148 MG/DL (ref 65–140)
GLUCOSE SERPL-MCNC: 159 MG/DL (ref 65–140)
GLUCOSE SERPL-MCNC: 177 MG/DL (ref 65–140)
GLUCOSE SERPL-MCNC: 189 MG/DL (ref 65–140)
GRAM STN SPEC: ABNORMAL
GRAM STN SPEC: NORMAL
HCT VFR BLD AUTO: 44.6 % (ref 34.8–46.1)
HGB BLD-MCNC: 13.6 G/DL (ref 11.5–15.4)
IMM GRANULOCYTES # BLD AUTO: 0.01 THOUSAND/UL (ref 0–0.2)
IMM GRANULOCYTES NFR BLD AUTO: 0 % (ref 0–2)
LYMPHOCYTES # BLD AUTO: 0.85 THOUSANDS/ΜL (ref 0.6–4.47)
LYMPHOCYTES NFR BLD AUTO: 15 % (ref 14–44)
MAGNESIUM SERPL-MCNC: 1.5 MG/DL (ref 1.9–2.7)
MCH RBC QN AUTO: 28.5 PG (ref 26.8–34.3)
MCHC RBC AUTO-ENTMCNC: 30.5 G/DL (ref 31.4–37.4)
MCV RBC AUTO: 93 FL (ref 82–98)
MONOCYTES # BLD AUTO: 0.44 THOUSAND/ΜL (ref 0.17–1.22)
MONOCYTES NFR BLD AUTO: 8 % (ref 4–12)
NEUTROPHILS # BLD AUTO: 3.58 THOUSANDS/ΜL (ref 1.85–7.62)
NEUTS SEG NFR BLD AUTO: 65 % (ref 43–75)
NRBC BLD AUTO-RTO: 0 /100 WBCS
PLATELET # BLD AUTO: 251 THOUSANDS/UL (ref 149–390)
PMV BLD AUTO: 10.1 FL (ref 8.9–12.7)
POTASSIUM SERPL-SCNC: 4.6 MMOL/L (ref 3.5–5.3)
PROCALCITONIN SERPL-MCNC: 0.22 NG/ML
RBC # BLD AUTO: 4.78 MILLION/UL (ref 3.81–5.12)
SODIUM SERPL-SCNC: 137 MMOL/L (ref 135–147)
WBC # BLD AUTO: 5.55 THOUSAND/UL (ref 4.31–10.16)

## 2024-10-27 PROCEDURE — 82948 REAGENT STRIP/BLOOD GLUCOSE: CPT

## 2024-10-27 PROCEDURE — 83735 ASSAY OF MAGNESIUM: CPT | Performed by: NURSE PRACTITIONER

## 2024-10-27 PROCEDURE — 80048 BASIC METABOLIC PNL TOTAL CA: CPT | Performed by: NURSE PRACTITIONER

## 2024-10-27 PROCEDURE — 99233 SBSQ HOSP IP/OBS HIGH 50: CPT | Performed by: NURSE PRACTITIONER

## 2024-10-27 PROCEDURE — 84145 PROCALCITONIN (PCT): CPT | Performed by: NURSE PRACTITIONER

## 2024-10-27 PROCEDURE — 85025 COMPLETE CBC W/AUTO DIFF WBC: CPT | Performed by: NURSE PRACTITIONER

## 2024-10-27 RX ORDER — AZITHROMYCIN 250 MG/1
500 TABLET, FILM COATED ORAL EVERY 24 HOURS
Status: DISCONTINUED | OUTPATIENT
Start: 2024-10-27 | End: 2024-10-28 | Stop reason: HOSPADM

## 2024-10-27 RX ORDER — MAGNESIUM SULFATE HEPTAHYDRATE 40 MG/ML
2 INJECTION, SOLUTION INTRAVENOUS ONCE
Status: COMPLETED | OUTPATIENT
Start: 2024-10-27 | End: 2024-10-28

## 2024-10-27 RX ADMIN — FLUTICASONE FUROATE AND VILANTEROL TRIFENATATE 1 PUFF: 200; 25 POWDER RESPIRATORY (INHALATION) at 08:08

## 2024-10-27 RX ADMIN — ATORVASTATIN CALCIUM 10 MG: 10 TABLET, FILM COATED ORAL at 16:31

## 2024-10-27 RX ADMIN — ALBUTEROL SULFATE 2 PUFF: 90 AEROSOL, METERED RESPIRATORY (INHALATION) at 08:08

## 2024-10-27 RX ADMIN — DOCUSATE SODIUM 100 MG: 100 CAPSULE, LIQUID FILLED ORAL at 08:13

## 2024-10-27 RX ADMIN — INSULIN LISPRO 1 UNITS: 100 INJECTION, SOLUTION INTRAVENOUS; SUBCUTANEOUS at 11:36

## 2024-10-27 RX ADMIN — RIVAROXABAN 2.5 MG: 2.5 TABLET, FILM COATED ORAL at 18:05

## 2024-10-27 RX ADMIN — RIVAROXABAN 2.5 MG: 2.5 TABLET, FILM COATED ORAL at 08:15

## 2024-10-27 RX ADMIN — PANTOPRAZOLE SODIUM 40 MG: 40 TABLET, DELAYED RELEASE ORAL at 08:13

## 2024-10-27 RX ADMIN — BUPROPION HYDROCHLORIDE 300 MG: 150 TABLET, EXTENDED RELEASE ORAL at 08:13

## 2024-10-27 RX ADMIN — DOCUSATE SODIUM 100 MG: 100 CAPSULE, LIQUID FILLED ORAL at 18:03

## 2024-10-27 RX ADMIN — LISINOPRIL 5 MG: 5 TABLET ORAL at 08:13

## 2024-10-27 RX ADMIN — CEFTRIAXONE 1000 MG: 1 INJECTION, SOLUTION INTRAVENOUS at 23:05

## 2024-10-27 RX ADMIN — AZITHROMYCIN DIHYDRATE 500 MG: 250 TABLET ORAL at 23:05

## 2024-10-27 RX ADMIN — INSULIN LISPRO 1 UNITS: 100 INJECTION, SOLUTION INTRAVENOUS; SUBCUTANEOUS at 08:07

## 2024-10-27 RX ADMIN — PREGABALIN 200 MG: 100 CAPSULE ORAL at 08:13

## 2024-10-27 RX ADMIN — ASPIRIN 81 MG 81 MG: 81 TABLET ORAL at 08:13

## 2024-10-27 RX ADMIN — CLONAZEPAM 1 MG: 1 TABLET ORAL at 18:03

## 2024-10-27 RX ADMIN — ACETAMINOPHEN 975 MG: 325 TABLET ORAL at 21:46

## 2024-10-27 RX ADMIN — ALBUTEROL SULFATE 2 PUFF: 90 AEROSOL, METERED RESPIRATORY (INHALATION) at 20:17

## 2024-10-27 RX ADMIN — MAGNESIUM SULFATE HEPTAHYDRATE 2 G: 40 INJECTION, SOLUTION INTRAVENOUS at 11:37

## 2024-10-27 RX ADMIN — OXYBUTYNIN CHLORIDE 5 MG: 5 TABLET, EXTENDED RELEASE ORAL at 08:13

## 2024-10-27 RX ADMIN — ACETAMINOPHEN 975 MG: 325 TABLET ORAL at 05:32

## 2024-10-27 RX ADMIN — POLYETHYLENE GLYCOL 3350 17 G: 17 POWDER, FOR SOLUTION ORAL at 08:15

## 2024-10-27 RX ADMIN — Medication 3 MG: at 21:46

## 2024-10-27 RX ADMIN — ACETAMINOPHEN 975 MG: 325 TABLET ORAL at 14:31

## 2024-10-27 RX ADMIN — AMILORIDE HYDROCLORIDE 5 MG: 5 TABLET ORAL at 08:13

## 2024-10-27 NOTE — PLAN OF CARE
Problem: INFECTION - ADULT  Goal: Absence or prevention of progression during hospitalization  Description: INTERVENTIONS:  - Assess and monitor for signs and symptoms of infection  - Monitor lab/diagnostic results  - Monitor all insertion sites, i.e. indwelling lines, tubes, and drains  - Monitor endotracheal if appropriate and nasal secretions for changes in amount and color  - [place] appropriate cooling/warming therapies per order  - Administer medications as ordered  - Instruct and encourage patient and family to use good hand hygiene technique  - Identify and instruct in appropriate isolation precautions for identified infection/condition  Outcome: Progressing     Problem: RESPIRATORY - ADULT  Goal: Achieves optimal ventilation and oxygenation  Description: INTERVENTIONS:  - Assess for changes in respiratory status  - Assess for changes in mentation and behavior  - Position to facilitate oxygenation and minimize respiratory effort  - Oxygen administered by appropriate delivery if ordered  - Initiate smoking cessation education as indicated  - Encourage broncho-pulmonary hygiene including cough, deep breathe, Incentive Spirometry  - Assess the need for suctioning and aspirate as needed  - Assess and instruct to report SOB or any respiratory difficulty  - Respiratory Therapy support as indicated  Wean O2 as tolerated  Outcome: Progressing

## 2024-10-27 NOTE — ASSESSMENT & PLAN NOTE
Continue Rocephin, and Zithromax day 4  COVID-19, RSV, and influenza are negative  Blood cultures x 2:1/2 no growth x 72 hours, 1/2 positive for gram-positive cocci in clusters. Suspect contaminant, as patient is clinically improving and without fever. Leukocytosis is resolved  Streptococcus antigen and Legionella antigen are both negative

## 2024-10-27 NOTE — ASSESSMENT & PLAN NOTE
O2 saturation on arrival was 85% on room air, likely secondary to pneumonia  Currently utilizing supplemental oxygen 2 L nasal cannula  Monitor oxygen requirements and wean oxygen as tolerated  Qualifies for home oxygen.  Will request case management assistance.

## 2024-10-27 NOTE — PLAN OF CARE
Problem: Prexisting or High Potential for Compromised Skin Integrity  Goal: Skin integrity is maintained or improved  Description: INTERVENTIONS:  - Identify patients at risk for skin breakdown  - Assess and monitor skin integrity  - Assess and monitor nutrition and hydration status  - Monitor labs   - Assess for incontinence   - Turn and reposition patient  - Assist with mobility/ambulation  - Relieve pressure over bony prominences  - Avoid friction and shearing  - Provide appropriate hygiene as needed including keeping skin clean and dry  - Evaluate need for skin moisturizer/barrier cream  - Collaborate with interdisciplinary team   - Patient/family teaching  - Consider wound care consult   Outcome: Progressing     Problem: PAIN - ADULT  Goal: Verbalizes/displays adequate comfort level or baseline comfort level  Description: Interventions:  - Encourage patient to monitor pain and request assistance  - Assess pain using appropriate pain scale  - Administer analgesics based on type and severity of pain and evaluate response  - Implement non-pharmacological measures as appropriate and evaluate response  - Consider cultural and social influences on pain and pain management  - Notify physician/advanced practitioner if interventions unsuccessful or patient reports new pain  Outcome: Progressing     Problem: INFECTION - ADULT  Goal: Absence or prevention of progression during hospitalization  Description: INTERVENTIONS:  - Assess and monitor for signs and symptoms of infection  - Monitor lab/diagnostic results  - Monitor all insertion sites, i.e. indwelling lines, tubes, and drains  - Monitor endotracheal if appropriate and nasal secretions for changes in amount and color  - [place] appropriate cooling/warming therapies per order  - Administer medications as ordered  - Instruct and encourage patient and family to use good hand hygiene technique  - Identify and instruct in appropriate isolation precautions for  identified infection/condition  Outcome: Progressing     Problem: SAFETY ADULT  Goal: Patient will remain free of falls  Description: INTERVENTIONS:  - Educate patient/family on patient safety including physical limitations  - Instruct patient to call for assistance with activity   - Consult OT/PT to assist with strengthening/mobility   - Keep Call bell within reach  - Keep bed low and locked with side rails adjusted as appropriate  - Keep care items and personal belongings within reach  - Initiate and maintain comfort rounds  - Make Fall Risk Sign visible to staff  - Offer Toileting every 2 Hours, in advance of need  - Initiate/Maintain bed alarm  - Apply yellow socks and bracelet for high fall risk patients  - Consider moving patient to room near nurses station  Outcome: Progressing  Goal: Maintain or return to baseline ADL function  Description: INTERVENTIONS:  -  Assess patient's ability to carry out ADLs; assess patient's baseline for ADL function and identify physical deficits which impact ability to perform ADLs (bathing, care of mouth/teeth, toileting, grooming, dressing, etc.)  - Assess/evaluate cause of self-care deficits   - Assess range of motion  - Assess patient's mobility; develop plan if impaired  - Assess patient's need for assistive devices and provide as appropriate  - Encourage maximum independence but intervene and supervise when necessary  - Involve family in performance of ADLs  - Assess for home care needs following discharge   - Consider OT consult to assist with ADL evaluation and planning for discharge  - Provide patient education as appropriate  Outcome: Progressing     Problem: DISCHARGE PLANNING  Goal: Discharge to home or other facility with appropriate resources  Description: INTERVENTIONS:  - Identify barriers to discharge w/patient and caregiver  - Arrange for needed discharge resources and transportation as appropriate  - Identify discharge learning needs (meds, wound care,  etc.)  - Refer to Case Management Department for coordinating discharge planning if the patient needs post-hospital services based on physician/advanced practitioner order or complex needs related to functional status, cognitive ability, or social support system  Outcome: Progressing     Problem: Knowledge Deficit  Goal: Patient/family/caregiver demonstrates understanding of disease process, treatment plan, medications, and discharge instructions  Description: Complete learning assessment and assess knowledge base.  Interventions:  - Provide teaching at level of understanding  - Provide teaching via preferred learning methods  Outcome: Progressing     Problem: RESPIRATORY - ADULT  Goal: Achieves optimal ventilation and oxygenation  Description: INTERVENTIONS:  - Assess for changes in respiratory status  - Assess for changes in mentation and behavior  - Position to facilitate oxygenation and minimize respiratory effort  - Oxygen administered by appropriate delivery if ordered  - Initiate smoking cessation education as indicated  - Encourage broncho-pulmonary hygiene including cough, deep breathe, Incentive Spirometry  - Assess the need for suctioning and aspirate as needed  - Assess and instruct to report SOB or any respiratory difficulty  - Respiratory Therapy support as indicated  Outcome: Progressing

## 2024-10-27 NOTE — PLAN OF CARE
Problem: Prexisting or High Potential for Compromised Skin Integrity  Goal: Skin integrity is maintained or improved  Description: INTERVENTIONS:  - Identify patients at risk for skin breakdown  - Assess and monitor skin integrity  - Assess and monitor nutrition and hydration status  - Monitor labs   - Assess for incontinence   - Turn and reposition patient  - Assist with mobility/ambulation  - Relieve pressure over bony prominences  - Avoid friction and shearing  - Provide appropriate hygiene as needed including keeping skin clean and dry  - Evaluate need for skin moisturizer/barrier cream  - Collaborate with interdisciplinary team   - Patient/family teaching  - Consider wound care consult   Outcome: Progressing     Problem: PAIN - ADULT  Goal: Verbalizes/displays adequate comfort level or baseline comfort level  Description: Interventions:  - Encourage patient to monitor pain and request assistance  - Assess pain using appropriate pain scale  - Administer analgesics based on type and severity of pain and evaluate response  - Implement non-pharmacological measures as appropriate and evaluate response  - Consider cultural and social influences on pain and pain management  - Notify physician/advanced practitioner if interventions unsuccessful or patient reports new pain  Outcome: Progressing     Problem: INFECTION - ADULT  Goal: Absence or prevention of progression during hospitalization  Description: INTERVENTIONS:  - Assess and monitor for signs and symptoms of infection  - Monitor lab/diagnostic results  - Monitor all insertion sites, i.e. indwelling lines, tubes, and drains  - Monitor endotracheal if appropriate and nasal secretions for changes in amount and color  - [place] appropriate cooling/warming therapies per order  - Administer medications as ordered  - Instruct and encourage patient and family to use good hand hygiene technique  - Identify and instruct in appropriate isolation precautions for  identified infection/condition  Outcome: Progressing     Problem: SAFETY ADULT  Goal: Patient will remain free of falls  Description: INTERVENTIONS:  - Educate patient/family on patient safety including physical limitations  - Instruct patient to call for assistance with activity   - Consult OT/PT to assist with strengthening/mobility   - Keep Call bell within reach  - Keep bed low and locked with side rails adjusted as appropriate  - Keep care items and personal belongings within reach  - Initiate and maintain comfort rounds  - Make Fall Risk Sign visible to staff  - Offer Toileting every 2 Hours, in advance of need  - Initiate/Maintain bed alarm  - Apply yellow socks and bracelet for high fall risk patients  - Consider moving patient to room near nurses station  Outcome: Progressing  Goal: Maintain or return to baseline ADL function  Description: INTERVENTIONS:  -  Assess patient's ability to carry out ADLs; assess patient's baseline for ADL function and identify physical deficits which impact ability to perform ADLs (bathing, care of mouth/teeth, toileting, grooming, dressing, etc.)  - Assess/evaluate cause of self-care deficits   - Assess range of motion  - Assess patient's mobility; develop plan if impaired  - Assess patient's need for assistive devices and provide as appropriate  - Encourage maximum independence but intervene and supervise when necessary  - Involve family in performance of ADLs  - Assess for home care needs following discharge   - Consider OT consult to assist with ADL evaluation and planning for discharge  - Provide patient education as appropriate  Outcome: Progressing     Problem: DISCHARGE PLANNING  Goal: Discharge to home or other facility with appropriate resources  Description: INTERVENTIONS:  - Identify barriers to discharge w/patient and caregiver  - Arrange for needed discharge resources and transportation as appropriate  - Identify discharge learning needs (meds, wound care,  etc.)  - Refer to Case Management Department for coordinating discharge planning if the patient needs post-hospital services based on physician/advanced practitioner order or complex needs related to functional status, cognitive ability, or social support system  Outcome: Progressing     Problem: Knowledge Deficit  Goal: Patient/family/caregiver demonstrates understanding of disease process, treatment plan, medications, and discharge instructions  Description: Complete learning assessment and assess knowledge base.  Interventions:  - Provide teaching at level of understanding  - Provide teaching via preferred learning methods  Outcome: Progressing     Problem: RESPIRATORY - ADULT  Goal: Achieves optimal ventilation and oxygenation  Description: INTERVENTIONS:  - Assess for changes in respiratory status  - Assess for changes in mentation and behavior  - Position to facilitate oxygenation and minimize respiratory effort  - Oxygen administered by appropriate delivery if ordered  - Initiate smoking cessation education as indicated  - Encourage broncho-pulmonary hygiene including cough, deep breathe, Incentive Spirometry  - Assess the need for suctioning and aspirate as needed  - Assess and instruct to report SOB or any respiratory difficulty  - Respiratory Therapy support as indicated  Outcome: Progressing

## 2024-10-27 NOTE — ASSESSMENT & PLAN NOTE
Lab Results   Component Value Date    HGBA1C 7.4 (H) 10/24/2024       Recent Labs     10/26/24  1623 10/26/24  2132 10/27/24  0806 10/27/24  1136   POCGLU 190* 157* 177* 189*       Blood Sugar Average: Last 72 hrs:  (P) 158.8237994155756908    Oral hypoglycemic medications are on hold  Continue Accu-Cheks before meals and at bedtime with sliding scale coverage

## 2024-10-27 NOTE — PROGRESS NOTES
Progress Note - Hospitalist   Name: Lona Cedeno 79 y.o. female I MRN: [id number]  Unit/Bed#: -01 I Date of Admission: 10/23/2024   Date of Service: 10/27/2024 I Hospital Day: 4    Assessment & Plan  Sepsis with acute respiratory failure (HCC)  Met sepsis criteria with leukocytosis (WBC 19.75) tachycardia and right lower lobe pneumonia.  Sepsis parameters are improved  Patient demonstrated some confusion at time of arrival, now resolved.  This occurred in the setting of sepsis and hypoxia  S/p IV fluids, since discontinued  Monitor labs and vital signs  Community acquired pneumonia of right lower lobe of lung  Continue Rocephin, and Zithromax day 4  COVID-19, RSV, and influenza are negative  Blood cultures x 2:1/2 no growth x 72 hours, 1/2 positive for gram-positive cocci in clusters. Suspect contaminant, as patient is clinically improving and without fever. Leukocytosis is resolved  Streptococcus antigen and Legionella antigen are both negative  Acute respiratory failure with hypoxia (HCC)  O2 saturation on arrival was 85% on room air, likely secondary to pneumonia  Currently utilizing supplemental oxygen 2 L nasal cannula  Monitor oxygen requirements and wean oxygen as tolerated  Qualifies for home oxygen.  Will request case management assistance.  Type 2 diabetes mellitus with diabetic polyneuropathy (HCC)  Lab Results   Component Value Date    HGBA1C 7.4 (H) 10/24/2024       Recent Labs     10/26/24  1623 10/26/24  2132 10/27/24  0806 10/27/24  1136   POCGLU 190* 157* 177* 189*       Blood Sugar Average: Last 72 hrs:  (P) [id number]    Oral hypoglycemic medications are on hold  Continue Accu-Cheks before meals and at bedtime with sliding scale coverage  Essential hypertension  Blood pressure stable   Continue amiloride and lisinopril   Anxiety and depression  Continue prehospital Wellbutrin  mg p.o.   Continue clonazepam 1 mg in the evening  Mood currently stable  GERD without  esophagitis  Continue Protonix 40 mg p.o. daily   PAD (peripheral artery disease) (HCC)  Continue aspirin, Lipitor, and Xarelto   COPD, severity to be determined (HCC)  Currently without exacerbation  Continue treatment per respiratory respiratory protocol  Of note, patient continues to smoke  Continue Fluticasone-vilanterol 200-25 micrograms 1 puff daily and albuterol inhaler 2 puffs as needed twice daily    Tobacco use disorder  Nicotine patch offered  Cessation recommended  Hyponatremia  Probably hypovolemic hyponatremia  Daily BMP and trend sodium  Hypomagnesemia  Monitor magnesium and replete as needed  Acute encephalopathy  Presented with confusion in the setting of hypoxia/sepsis.  Now resolved  CT head negative for acute intracranial findings    VTE Pharmacologic Prophylaxis: VTE Score: 7 High Risk (Score >/= 5) - Pharmacological DVT Prophylaxis Ordered: rivaroxaban (Xarelto). Sequential Compression Devices Ordered.    Mobility:   Basic Mobility Inpatient Raw Score: 22  JH-HLM Goal: 7: Walk 25 feet or more  JH-HLM Achieved: 6: Walk 10 steps or more  JH-HLM Goal NOT achieved. Continue with multidisciplinary rounding and encourage appropriate mobility to improve upon JH-HLM goals.    Patient Centered Rounds: I performed bedside rounds with nursing staff today.     Education and Discussions with Family / Patient: Updated  () at bedside.    Current Length of Stay: 4 day(s)  Current Patient Status: Inpatient   Certification Statement: The patient will continue to require additional inpatient hospital stay due to pneumonia, care coordination.  Discharge Plan: Anticipate discharge later today or tomorrow to home.    Code Status: Level 3 - DNAR and DNI    Subjective   Patient evaluated at bedside.  She said she is feeling even more improved than yesterday.  She is still requiring supplemental oxygen.  Patient was hoping to go home, however patient's  is legally blind and does not drive.   Her daughter is out of the area in [place].  The patient would require oral antibiotics from [facility] in [place], pharmacy closes at 3 PM, patient or  would not be able to .     Objective :  Temp:  [97.9 °F (36.6 °C)-98.2 °F (36.8 °C)] 97.9 °F (36.6 °C)  HR:  [65-73] 73  BP: (105-125)/(43-75) 125/75  Resp:  [18] 18  SpO2:  [86 %-95 %] 91 %  O2 Device: Nasal cannula  Nasal Cannula O2 Flow Rate (L/min):  [1 L/min-2 L/min] 2 L/min    Body mass index is 32.91 kg/m².     Input and Output Summary (last 24 hours):     Intake/Output Summary (Last 24 hours) at 10/27/2024 1146  Last data filed at 10/27/2024 0830  Gross per 24 hour   Intake 3211 ml   Output 3100 ml   Net 111 ml       Physical Exam  Vitals and nursing note reviewed.   Constitutional:       General: She is not in acute distress.  HENT:      Head: Normocephalic and atraumatic.      Nose: Nose normal.      Mouth/Throat:      Mouth: Mucous membranes are moist.      Pharynx: Oropharynx is clear.   Eyes:      Pupils: Pupils are equal, round, and reactive to light.   Cardiovascular:      Rate and Rhythm: Normal rate and regular rhythm.      Pulses: Normal pulses.      Heart sounds: Normal heart sounds.   Pulmonary:      Effort: Pulmonary effort is normal. No respiratory distress.      Breath sounds: Rhonchi present.   Abdominal:      General: Bowel sounds are normal.      Palpations: Abdomen is soft.      Tenderness: There is no abdominal tenderness.   Musculoskeletal:      Cervical back: Neck supple.      Right lower leg: No edema.      Left lower leg: No edema.   Skin:     General: Skin is warm and dry.      Capillary Refill: Capillary refill takes less than 2 seconds.   Neurological:      General: No focal deficit present.      Mental Status: She is alert and oriented to person, place, and time. Mental status is at baseline.           Lines/Drains:              Lab Results: I have reviewed the following results:   Results from last 7 days    Lab Units 10/27/24  0528 10/25/24  0504 10/24/24  0440   WBC Thousand/uL 5.55   < > 15.46*   HEMOGLOBIN g/dL 13.6   < > 12.4   HEMATOCRIT % 44.6   < > 40.1   PLATELETS Thousands/uL 251   < > 200   BANDS PCT %  --   --  14*   SEGS PCT % 65   < >  --    LYMPHO PCT % 15   < > 3*   MONO PCT % 8   < > 5   EOS PCT % 11*   < > 2    < > = values in this interval not displayed.     Results from last 7 days   Lab Units 10/27/24  0643 10/24/24  0440 10/23/24  1930   SODIUM mmol/L 137   < > 132*   POTASSIUM mmol/L 4.6   < > 5.2   CHLORIDE mmol/L 101   < > 97   CO2 mmol/L 30   < > 28   BUN mg/dL 11   < > 12   CREATININE mg/dL 0.55*   < > 0.73   ANION GAP mmol/L 6   < > 7   CALCIUM mg/dL 10.0   < > 10.4*   ALBUMIN g/dL  --   --  4.2   TOTAL BILIRUBIN mg/dL  --   --  0.38   ALK PHOS U/L  --   --  57   ALT U/L  --   --  8   AST U/L  --   --  18   GLUCOSE RANDOM mg/dL 159*   < > 141*    < > = values in this interval not displayed.     Results from last 7 days   Lab Units 10/23/24  1930   INR  1.11     Results from last 7 days   Lab Units 10/27/24  1136 10/27/24  0806 10/26/24  2132 10/26/24  1623 10/26/24  1155 10/26/24  0641 10/25/24  2040 10/25/24  1607 10/25/24  1103 10/25/24  0655 10/24/24  2132 10/24/24  1606   POC GLUCOSE mg/dl 189* 177* 157* 190* 133 141* 150* 174* 147* 166* 144* 129     Results from last 7 days   Lab Units 10/24/24  1518   HEMOGLOBIN A1C % 7.4*     Results from last 7 days   Lab Units 10/27/24  0643 10/24/24  0440 10/23/24  2226 10/23/24  1930   LACTIC ACID mmol/L  --   --  2.1* 2.6*   PROCALCITONIN ng/ml 0.22 1.26*  --  1.29*       Recent Cultures (last 7 days):   Results from last 7 days   Lab Units 10/24/24  2142 10/23/24  2009 10/23/24  1930   BLOOD CULTURE   --  No Growth at 72 hrs.  --    SPUTUM CULTURE  4+ Growth of  --   --    GRAM STAIN RESULT  No Polys or Bacteria seen  --  Gram positive cocci in clusters*   LEGIONELLA URINARY ANTIGEN  Negative  --   --        Last 24 Hours Medication List:      Current Facility-Administered Medications:     acetaminophen (TYLENOL) tablet 975 mg, Q8H JILLIAN    albuterol (PROVENTIL HFA,VENTOLIN HFA) inhaler 2 puff, BID    AMILoride tablet 5 mg, Daily    aspirin chewable tablet 81 mg, Daily    atorvastatin (LIPITOR) tablet 10 mg, Daily With Dinner    azithromycin (ZITHROMAX) tablet 500 mg, Q24H    buPROPion (WELLBUTRIN XL) 24 hr tablet 300 mg, Daily    cefTRIAXone (ROCEPHIN) IVPB (premix in dextrose) 1,000 mg 50 mL, Q24H, Last Rate: 1,000 mg (10/26/24 2242)    clonazePAM (KlonoPIN) tablet 1 mg, QPM    docusate sodium (COLACE) capsule 100 mg, BID    fluticasone-vilanterol 200-25 mcg/actuation 1 puff, Daily    insulin lispro (HumALOG/ADMELOG) 100 units/mL subcutaneous injection 1-6 Units, TID AC **AND** Fingerstick Glucose (POCT), TID AC    insulin lispro (HumALOG/ADMELOG) 100 units/mL subcutaneous injection 1-6 Units, HS    lisinopril (ZESTRIL) tablet 5 mg, Daily    magnesium sulfate 2 g/50 mL IVPB (premix) 2 g, Once, Last Rate: 2 g (10/27/24 1137)    melatonin tablet 3 mg, HS    oxybutynin (DITROPAN-XL) 24 hr tablet 5 mg, Daily    pantoprazole (PROTONIX) EC tablet 40 mg, Daily    polyethylene glycol (MIRALAX) packet 17 g, Daily    pregabalin (LYRICA) capsule 200 mg, Daily    rivaroxaban (XARELTO) tablet 2.5 mg, BID    Administrative Statements   Today, Patient Was Seen By: ROXANE Murphy  I have spent a total time of 40 minutes in caring for this patient on the day of the visit/encounter including Diagnostic results, Risks and benefits of tx options, Instructions for management, Patient and family education, Importance of tx compliance, Impressions, Counseling / Coordination of care, Documenting in the medical record, Reviewing / ordering tests, medicine, procedures  , Obtaining or reviewing history  , and Communicating with other healthcare professionals .    **Please Note: This note may have been constructed using a voice recognition system.**

## 2024-10-27 NOTE — ASSESSMENT & PLAN NOTE
Met sepsis criteria with leukocytosis (WBC 19.75) tachycardia and right lower lobe pneumonia.  Sepsis parameters are improved  Patient demonstrated some confusion at time of arrival, now resolved.  This occurred in the setting of sepsis and hypoxia  S/p IV fluids, since discontinued  Monitor labs and vital signs

## 2024-10-28 VITALS
HEIGHT: 61 IN | HEART RATE: 70 BPM | SYSTOLIC BLOOD PRESSURE: 117 MMHG | TEMPERATURE: 97.8 F | BODY MASS INDEX: 32.88 KG/M2 | RESPIRATION RATE: 16 BRPM | OXYGEN SATURATION: 93 % | WEIGHT: 174.16 LBS | DIASTOLIC BLOOD PRESSURE: 50 MMHG

## 2024-10-28 LAB
ANION GAP SERPL CALCULATED.3IONS-SCNC: 5 MMOL/L (ref 4–13)
BASOPHILS # BLD AUTO: 0.04 THOUSANDS/ΜL (ref 0–0.1)
BASOPHILS NFR BLD AUTO: 1 % (ref 0–1)
BUN SERPL-MCNC: 14 MG/DL (ref 5–25)
CALCIUM SERPL-MCNC: 10.3 MG/DL (ref 8.4–10.2)
CHLORIDE SERPL-SCNC: 99 MMOL/L (ref 96–108)
CO2 SERPL-SCNC: 33 MMOL/L (ref 21–32)
CREAT SERPL-MCNC: 0.62 MG/DL (ref 0.6–1.3)
DME PARACHUTE DELIVERY DATE REQUESTED: NORMAL
DME PARACHUTE DELIVERY NOTE: NORMAL
DME PARACHUTE ITEM DESCRIPTION: NORMAL
DME PARACHUTE ORDER STATUS: NORMAL
DME PARACHUTE SUPPLIER NAME: NORMAL
DME PARACHUTE SUPPLIER PHONE: NORMAL
EOSINOPHIL # BLD AUTO: 0.57 THOUSAND/ΜL (ref 0–0.61)
EOSINOPHIL NFR BLD AUTO: 9 % (ref 0–6)
ERYTHROCYTE [DISTWIDTH] IN BLOOD BY AUTOMATED COUNT: 14.6 % (ref 11.6–15.1)
GFR SERPL CREATININE-BSD FRML MDRD: 86 ML/MIN/1.73SQ M
GLUCOSE SERPL-MCNC: 162 MG/DL (ref 65–140)
GLUCOSE SERPL-MCNC: 168 MG/DL (ref 65–140)
GLUCOSE SERPL-MCNC: 180 MG/DL (ref 65–140)
HCT VFR BLD AUTO: 43.2 % (ref 34.8–46.1)
HGB BLD-MCNC: 13.3 G/DL (ref 11.5–15.4)
IMM GRANULOCYTES # BLD AUTO: 0.01 THOUSAND/UL (ref 0–0.2)
IMM GRANULOCYTES NFR BLD AUTO: 0 % (ref 0–2)
LYMPHOCYTES # BLD AUTO: 0.85 THOUSANDS/ΜL (ref 0.6–4.47)
LYMPHOCYTES NFR BLD AUTO: 13 % (ref 14–44)
MAGNESIUM SERPL-MCNC: 1.7 MG/DL (ref 1.9–2.7)
MCH RBC QN AUTO: 28.7 PG (ref 26.8–34.3)
MCHC RBC AUTO-ENTMCNC: 30.8 G/DL (ref 31.4–37.4)
MCV RBC AUTO: 93 FL (ref 82–98)
MONOCYTES # BLD AUTO: 0.56 THOUSAND/ΜL (ref 0.17–1.22)
MONOCYTES NFR BLD AUTO: 8 % (ref 4–12)
NEUTROPHILS # BLD AUTO: 4.61 THOUSANDS/ΜL (ref 1.85–7.62)
NEUTS SEG NFR BLD AUTO: 69 % (ref 43–75)
NRBC BLD AUTO-RTO: 0 /100 WBCS
PLATELET # BLD AUTO: 250 THOUSANDS/UL (ref 149–390)
PMV BLD AUTO: 9.5 FL (ref 8.9–12.7)
POTASSIUM SERPL-SCNC: 5 MMOL/L (ref 3.5–5.3)
RBC # BLD AUTO: 4.63 MILLION/UL (ref 3.81–5.12)
SODIUM SERPL-SCNC: 137 MMOL/L (ref 135–147)
WBC # BLD AUTO: 6.64 THOUSAND/UL (ref 4.31–10.16)

## 2024-10-28 PROCEDURE — 82948 REAGENT STRIP/BLOOD GLUCOSE: CPT

## 2024-10-28 PROCEDURE — 85025 COMPLETE CBC W/AUTO DIFF WBC: CPT | Performed by: NURSE PRACTITIONER

## 2024-10-28 PROCEDURE — 80048 BASIC METABOLIC PNL TOTAL CA: CPT | Performed by: NURSE PRACTITIONER

## 2024-10-28 PROCEDURE — 83735 ASSAY OF MAGNESIUM: CPT | Performed by: NURSE PRACTITIONER

## 2024-10-28 PROCEDURE — 99239 HOSP IP/OBS DSCHRG MGMT >30: CPT | Performed by: INTERNAL MEDICINE

## 2024-10-28 RX ORDER — CEFDINIR 300 MG/1
300 CAPSULE ORAL EVERY 12 HOURS SCHEDULED
Qty: 6 CAPSULE | Refills: 0 | Status: SHIPPED | OUTPATIENT
Start: 2024-10-28 | End: 2024-10-31

## 2024-10-28 RX ADMIN — PREGABALIN 200 MG: 100 CAPSULE ORAL at 08:15

## 2024-10-28 RX ADMIN — BUPROPION HYDROCHLORIDE 300 MG: 150 TABLET, EXTENDED RELEASE ORAL at 08:15

## 2024-10-28 RX ADMIN — PANTOPRAZOLE SODIUM 40 MG: 40 TABLET, DELAYED RELEASE ORAL at 08:15

## 2024-10-28 RX ADMIN — AMILORIDE HYDROCLORIDE 5 MG: 5 TABLET ORAL at 08:15

## 2024-10-28 RX ADMIN — ACETAMINOPHEN 975 MG: 325 TABLET ORAL at 14:02

## 2024-10-28 RX ADMIN — ALBUTEROL SULFATE 2 PUFF: 90 AEROSOL, METERED RESPIRATORY (INHALATION) at 08:17

## 2024-10-28 RX ADMIN — FLUTICASONE FUROATE AND VILANTEROL TRIFENATATE 1 PUFF: 200; 25 POWDER RESPIRATORY (INHALATION) at 08:17

## 2024-10-28 RX ADMIN — RIVAROXABAN 2.5 MG: 2.5 TABLET, FILM COATED ORAL at 08:17

## 2024-10-28 RX ADMIN — INSULIN LISPRO 1 UNITS: 100 INJECTION, SOLUTION INTRAVENOUS; SUBCUTANEOUS at 11:45

## 2024-10-28 RX ADMIN — OXYBUTYNIN CHLORIDE 5 MG: 5 TABLET, EXTENDED RELEASE ORAL at 08:15

## 2024-10-28 RX ADMIN — POLYETHYLENE GLYCOL 3350 17 G: 17 POWDER, FOR SOLUTION ORAL at 08:15

## 2024-10-28 RX ADMIN — LISINOPRIL 5 MG: 5 TABLET ORAL at 08:15

## 2024-10-28 RX ADMIN — INSULIN LISPRO 1 UNITS: 100 INJECTION, SOLUTION INTRAVENOUS; SUBCUTANEOUS at 07:18

## 2024-10-28 RX ADMIN — ACETAMINOPHEN 975 MG: 325 TABLET ORAL at 06:32

## 2024-10-28 RX ADMIN — DOCUSATE SODIUM 100 MG: 100 CAPSULE, LIQUID FILLED ORAL at 08:15

## 2024-10-28 RX ADMIN — ASPIRIN 81 MG 81 MG: 81 TABLET ORAL at 08:15

## 2024-10-28 NOTE — CASE MANAGEMENT
Case Management Discharge Planning Note    Patient name Lona Cedeno  Location /-01 MRN [id number]  : 1945 Date 10/28/2024       Current Admission Date: 10/23/2024  Current Admission Diagnosis:Sepsis with acute respiratory failure (HCC)   Patient Active Problem List    Diagnosis Date Noted Date Diagnosed    Acute encephalopathy 10/25/2024     Hyponatremia 10/24/2024     Community acquired pneumonia of right lower lobe of lung 2024     Open wound of right foot 2024     Hyperkalemia 2024     Acute metabolic encephalopathy 2024     Toxic encephalopathy 2024     Acute respiratory failure with hypoxia (HCC) 2024     Sacral wound 2024     Other dietary vitamin B12 deficiency anemia 2024     Leukocytosis 2024     Sepsis with acute respiratory failure (HCC) 2024     At risk for venous thromboembolism (VTE) 2024     At high risk for skin breakdown 2024     Wound of skin 2024     Impaired mobility and activities of daily living 2024     Anemia 2024     Constipation 2024     Age-related osteoporosis without current pathological fracture 2023     Abnormal CT of the chest 10/17/2023     COPD, severity to be determined ([facility]) 10/17/2023     Tobacco use disorder 10/17/2023     PAD (peripheral artery disease) ([facility]) 10/10/2023     Bladder neoplasm 2023     Left renal mass 2023     Hypomagnesemia 2023     Degenerative lumbar disc 2023     Urinary incontinence 2023     GERD without esophagitis 10/16/2019     Essential hypertension 02/15/2019     Anxiety and depression 02/15/2019     Type 2 diabetes mellitus with diabetic polyneuropathy (HCC) 2019       LOS (days): 5  Geometric Mean LOS (GMLOS) (days): 4.9  Days to GMLOS:0.2     OBJECTIVE:  Risk of Unplanned Readmission Score: 30.48         Current admission status: Inpatient   Preferred Pharmacy:   [id number] -  [place] - [address]  [address] [facility] [address]  Phone: [phone] Fax: [phone]    [facility] [place] - [address]  [address] [place] [address]  Phone: [phone] Fax: [phone]    [facility] - [place], [place] - [address]  [address]  [place] [address]  Phone: [phone] Fax: [phone]    Primary Care Provider: Vivek Preston DO    Primary Insurance: [facility] REP  Secondary Insurance:     DISCHARGE DETAILS:    Discharge planning discussed with:: patient & spouse was called at 10:12 am & Ariela(daughter) at 10:17 am & 12:06pm   Freedom of Choice: Yes  Comments - Freedom of Choice: pt decelined [facility]- list of DME companies reviewed- pt's choice is [employer]-  pt was tested & qualified for home oxygen - order sent via parachute  CM contacted family/caregiver?: Yes  Were Treatment Team discharge recommendations reviewed with patient/caregiver?: Yes  Did patient/caregiver verbalize understanding of patient care needs?: Yes  Were patient/caregiver advised of the risks associated with not following Treatment Team discharge recommendations?: Yes    Contacts  Patient Contacts: Carmelo Gregg  and Ariela Feng  Relationship to Patient:: Family (spouse & daughter)  Contact Method: Phone  Phone Number: [phone] ( spouse)  & # [phone] daughter  Reason/Outcome: Discharge Planning    Requested Home Health Care         Is the patient interested in HHC at discharge?: No    DME Referral Provided  Referral made for DME?: Yes (desat completed- home oxygen needed)  DME referral completed for the following items:: Portable Oxygen tanks, Home Oxygen concentrator  DME Supplier Name:: Somewhere    Other Referral/Resources/Interventions Provided:  Interventions: DME  Referral Comments: pt declined [facility] - order sent via parachute for home oxygen - cm was given permission to release a POC- pt delivery is  set up for delivery tomorrow - pt is aware the POC will be picked up upon delivery of her home oxygen set up- POC was delivered to the pt's room, rn was made aware    Would you like to participate in our [facility] service program?  : No - Declined    Treatment Team Recommendation: Home (home with spouse & oxygen& outpt follow up-family)  Discharge Destination Plan:: Home (home with family & oxygen & outpt follow up- family)  Transport at Discharge : Family         Pt and family are in agreement with the d/c & d/c plan                          Family notified:: spouse & daughter was called

## 2024-10-28 NOTE — ASSESSMENT & PLAN NOTE
Sepsis criteria met with leukocytosis and tachycardia in the setting of CAP  B Clx (10/23): Micrococcus Luteus- Likely contaminant   Further management as below

## 2024-10-28 NOTE — PLAN OF CARE
Problem: Prexisting or High Potential for Compromised Skin Integrity  Goal: Skin integrity is maintained or improved  Description: INTERVENTIONS:  - Identify patients at risk for skin breakdown  - Assess and monitor skin integrity  - Assess and monitor nutrition and hydration status  - Monitor labs   - Assess for incontinence   - Turn and reposition patient  - Assist with mobility/ambulation  - Relieve pressure over bony prominences  - Avoid friction and shearing  - Provide appropriate hygiene as needed including keeping skin clean and dry  - Evaluate need for skin moisturizer/barrier cream  - Collaborate with interdisciplinary team   - Patient/family teaching  - Consider wound care consult   Outcome: Progressing     Problem: PAIN - ADULT  Goal: Verbalizes/displays adequate comfort level or baseline comfort level  Description: Interventions:  - Encourage patient to monitor pain and request assistance  - Assess pain using appropriate pain scale  - Administer analgesics based on type and severity of pain and evaluate response  - Implement non-pharmacological measures as appropriate and evaluate response  - Consider cultural and social influences on pain and pain management  - Notify physician/advanced practitioner if interventions unsuccessful or patient reports new pain  Outcome: Progressing     Problem: INFECTION - ADULT  Goal: Absence or prevention of progression during hospitalization  Description: INTERVENTIONS:  - Assess and monitor for signs and symptoms of infection  - Monitor lab/diagnostic results  - Monitor all insertion sites, i.e. indwelling lines, tubes, and drains  - Monitor endotracheal if appropriate and nasal secretions for changes in amount and color  - [place] appropriate cooling/warming therapies per order  - Administer medications as ordered  - Instruct and encourage patient and family to use good hand hygiene technique  - Identify and instruct in appropriate isolation precautions for  identified infection/condition  Outcome: Progressing     Problem: SAFETY ADULT  Goal: Patient will remain free of falls  Description: INTERVENTIONS:  - Educate patient/family on patient safety including physical limitations  - Instruct patient to call for assistance with activity   - Consult OT/PT to assist with strengthening/mobility   - Keep Call bell within reach  - Keep bed low and locked with side rails adjusted as appropriate  - Keep care items and personal belongings within reach  - Initiate and maintain comfort rounds  - Make Fall Risk Sign visible to staff  - Offer Toileting every 2 Hours, in advance of need  - Initiate/Maintain bed alarm  - Apply yellow socks and bracelet for high fall risk patients  - Consider moving patient to room near nurses station  Outcome: Progressing     Problem: RESPIRATORY - ADULT  Goal: Achieves optimal ventilation and oxygenation  Description: INTERVENTIONS:  - Assess for changes in respiratory status  - Assess for changes in mentation and behavior  - Position to facilitate oxygenation and minimize respiratory effort  - Oxygen administered by appropriate delivery if ordered  - Initiate smoking cessation education as indicated  - Encourage broncho-pulmonary hygiene including cough, deep breathe, Incentive Spirometry  - Assess the need for suctioning and aspirate as needed  - Assess and instruct to report SOB or any respiratory difficulty  - Respiratory Therapy support as indicated  Outcome: Progressing     Problem: Knowledge Deficit  Goal: Patient/family/caregiver demonstrates understanding of disease process, treatment plan, medications, and discharge instructions  Description: Complete learning assessment and assess knowledge base.  Interventions:  - Provide teaching at level of understanding  - Provide teaching via preferred learning methods  Outcome: Progressing     Problem: DISCHARGE PLANNING  Goal: Discharge to home or other facility with appropriate  resources  Description: INTERVENTIONS:  - Identify barriers to discharge w/patient and caregiver  - Arrange for needed discharge resources and transportation as appropriate  - Identify discharge learning needs (meds, wound care, etc.)  - Refer to Case Management Department for coordinating discharge planning if the patient needs post-hospital services based on physician/advanced practitioner order or complex needs related to functional status, cognitive ability, or social support system  Outcome: Progressing

## 2024-10-28 NOTE — ASSESSMENT & PLAN NOTE
Lab Results   Component Value Date    HGBA1C 7.4 (H) 10/24/2024       Recent Labs     10/27/24  1136 10/27/24  1630 10/27/24  2137 10/28/24  0710   POCGLU 189* 147* 148* 180*       Blood Sugar Average: Last 72 hrs:  (P) 161.8389736012131632    Resume home Januvia and Metformin  Carb-controlled diet

## 2024-10-28 NOTE — NURSING NOTE
Pt stable for discharge. All belongings with patient. Sent home on oxygen. AVS reviewed with pt. All questions answered.

## 2024-10-28 NOTE — DISCHARGE SUMMARY
Discharge Summary - Hospitalist   Name: Lona Cedeno 79 y.o. female I MRN: [id number]  Unit/Bed#: -01 I Date of Admission: 10/23/2024   Date of Service: 10/28/2024 I Hospital Day: 5     Assessment & Plan  Sepsis with acute respiratory failure (HCC)  Sepsis criteria met with leukocytosis and tachycardia in the setting of CAP  B Clx (10/23): Micrococcus Luteus- Likely contaminant   Further management as below  Acute respiratory failure with hypoxia (HCC)  Continues to require supplemental oxygen  This is in the setting of COPD with unknown severity and acute pneumonia  Home oxygen evaluation completed and the patient qualifies for 2L NC continuously  CM to arrange home O2  Ambulatory referral for Pulmonology for further management  Community acquired pneumonia of right lower lobe of lung  Flu/RSV/COVID: Negative  Urine strep/legionella antigens negative  CT Chest (10/23): Right lower lobe consolidation and additional tree-in-bud and groundglass airspace opacities throughout the right lung suspicious for pneumonia.   Discharge home with an additional 3 days of oral Cefdinir  Acute encephalopathy  Resolved at this time  In the setting of sepsis and hypoxia  CT Brain (10/23): No acute intracranial abnormality   COPD, severity to be determined (HCC)  Not currently without exacerbation  Resume home inhalers  Ambulatory referral to Pulmonology   Type 2 diabetes mellitus with diabetic polyneuropathy (HCC)  Lab Results   Component Value Date    HGBA1C 7.4 (H) 10/24/2024       Recent Labs     10/27/24  1136 10/27/24  1630 10/27/24  2137 10/28/24  0710   POCGLU 189* 147* 148* 180*       Blood Sugar Average: Last 72 hrs:  (P) 161.7699062098548813    Resume home Januvia and Metformin  Carb-controlled diet  PAD (peripheral artery disease) (HCC)  Continue Aspirin, Lipitor, and Xarelto   Tobacco use disorder  Counseled on cessation     Medical Problems       Resolved Problems  Date Reviewed: 10/24/2024   None        Discharging Physician / Practitioner: Maria Colon DO  PCP: Vivek Preston DO  Admission Date:   Admission Orders (From admission, onward)       Ordered        10/23/24 2040  INPATIENT ADMISSION  Once                          Discharge Date: 10/28/24    Consultations During Hospital Stay:  None    Procedures Performed:   None    Significant Findings / Test Results:   CT Chest (10/23): Right lower lobe consolidation and additional tree-in-bud and groundglass airspace opacities throughout the right lung suspicious for pneumonia.     Incidental Findings:   Chronic right pectoralis major muscle seroma  Right-sided nasopharyngeal polyp    Test Results Pending at Discharge (will require follow up):   None     Outpatient Tests Requested:  To be determined upon outpatient follow-up with PCP and establishment with Pulmonology     Complications:  None    Reason for Admission: AMS, Sepsis    Hospital Course:   Lona Cedeno is a 79 y.o. female patient who originally presented to the hospital on 10/23/2024 due to confusion and headache.  Please see H&P as documented by Shaq Jurado for complete details regarding history of presenting illness.  In brief, the patient has a past medical history of COPD, PAD, hypertension, and diabetes who presented for evaluation as family reported increased confusion and patient complaint of headache.  The patient did meet sepsis criteria at the time of admission with likely source of right lower lobe pneumonia as demonstrated on CT of the chest.  Additionally she did meet acute hypoxic respiratory failure with oxygen saturations in the mid 80s on room air.  She was started on antibiotics, supplemental oxygen, and admitted to the medical floor for further observation and medical management.  During hospitalization, she had improvement in her mentation and sepsis parameters.  Unfortunately, the patient did continue to require 2 L nasal cannula and was therefore evaluated for home  "oxygen.  She did qualify for 2 L nasal cannula continuously and this was ultimately set up for her prior to discharge home.  She was discharged to complete an additional 3-day course of oral cefdinir.  She was discharged in stable condition and all of her questions were answered prior to departure.  This is a brief discharge summary please see full medical records for more details.      Please see above list of diagnoses and related plan for additional information.     Condition at Discharge: stable    Discharge Day Visit / Exam:   Subjective: Patient is sitting up at bedside without any acute complaints.  She is very anxious for discharge home at this time.    Vitals: Blood Pressure: 117/50 (10/28/24 0712)  Pulse: 70 (10/28/24 0712)  Temperature: 97.8 °F (36.6 °C) (10/28/24 0712)  Temp Source: Oral (10/27/24 2300)  Respirations: 16 (10/27/24 2300)  Height: 5' 1\" (154.9 cm) (10/24/24 0100)  Weight - Scale: 79 kg (174 lb 2.6 oz) (10/24/24 0100)  SpO2: 93 % (10/28/24 0839)    Physical Exam  Vitals and nursing note reviewed.   Constitutional:       General: She is not in acute distress.     Appearance: She is obese.   HENT:      Mouth/Throat:      Pharynx: Oropharynx is clear.   Cardiovascular:      Rate and Rhythm: Normal rate and regular rhythm.      Pulses: Normal pulses.      Heart sounds: Normal heart sounds.   Pulmonary:      Effort: Pulmonary effort is normal. No respiratory distress.      Breath sounds: Rhonchi present.      Comments: 2L NC  Abdominal:      General: Bowel sounds are normal. There is no distension.      Palpations: Abdomen is soft.   Neurological:      General: No focal deficit present.      Mental Status: She is alert and oriented to person, place, and time. Mental status is at baseline.          Discussion with Family:  Patient updated on plan of care.     Discharge instructions/Information to patient and family:   See after visit summary for information provided to patient and family.  "     Provisions for Follow-Up Care:  See after visit summary for information related to follow-up care and any pertinent home health orders.      Mobility at time of Discharge:   Basic Mobility Inpatient Raw Score: 22  JH-HLM Goal: 7: Walk 25 feet or more  JH-HLM Achieved: 7: Walk 25 feet or more  HLM Goal achieved. Continue to encourage appropriate mobility.     Disposition:   Home    Planned Readmission: None    Discharge Medications:  See after visit summary for reconciled discharge medications provided to patient and/or family.      Administrative Statements   Discharge Statement:  I have spent a total time of > 35 minutes in caring for this patient on the day of the visit/encounter.    **Please Note: This note may have been constructed using a voice recognition system**

## 2024-10-28 NOTE — ASSESSMENT & PLAN NOTE
Continues to require supplemental oxygen  This is in the setting of COPD with unknown severity and acute pneumonia  Home oxygen evaluation completed and the patient qualifies for 2L NC continuously  CM to arrange home O2  Ambulatory referral for Pulmonology for further management

## 2024-10-28 NOTE — ASSESSMENT & PLAN NOTE
Flu/RSV/COVID: Negative  Urine strep/legionella antigens negative  CT Chest (10/23): Right lower lobe consolidation and additional tree-in-bud and groundglass airspace opacities throughout the right lung suspicious for pneumonia.   Discharge home with an additional 3 days of oral Cefdinir

## 2024-10-28 NOTE — ASSESSMENT & PLAN NOTE
Resolved at this time  In the setting of sepsis and hypoxia  CT Brain (10/23): No acute intracranial abnormality

## 2024-10-29 ENCOUNTER — TRANSITIONAL CARE MANAGEMENT (OUTPATIENT)
Dept: FAMILY MEDICINE CLINIC | Facility: CLINIC | Age: 79
End: 2024-10-29

## 2024-10-29 LAB — BACTERIA BLD CULT: NORMAL

## 2024-10-29 NOTE — UTILIZATION REVIEW
NOTIFICATION OF ADMISSION DISCHARGE   This is a Notification of Discharge from [facility]. Please be advised that this patient has been discharge from our facility. Below you will find the admission and discharge date and time including the patient’s disposition.   UTILIZATION REVIEW CONTACT:  Jonathan Demarco  Utilization   Network Utilization Review Department  Phone: [phone] x carefully listen to the prompts. All voicemails are confidential.  Email: [email or web address]     ADMISSION INFORMATION  PRESENTATION DATE: 10/23/2024  7:13 PM  OBERVATION ADMISSION DATE: N/A  INPATIENT ADMISSION DATE: 10/23/24  8:40 PM   DISCHARGE DATE: 10/28/2024  3:52 PM   DISPOSITION:Home/Self Care    Network Utilization Review Department  ATTENTION: Please call with any questions or concerns to [phone] and carefully listen to the prompts so that you are directed to the right person. All voicemails are confidential.   For Discharge needs, contact Care Management DC Support Team at [phone] opt. 2  Send all requests for admission clinical reviews, approved or denied determinations and any other requests to dedicated fax number below belonging to the campus where the patient is receiving treatment. List of dedicated fax numbers for the Facilities:  FACILITY NAME UR FAX NUMBER   ADMISSION DENIALS (Administrative/Medical Necessity) [phone]   DISCHARGE SUPPORT TEAM ([facility]) [phone]   PARENT CHILD HEALTH (Maternity/NICU/Pediatrics) [phone]   [facility] [phone]   [facility] [phone]   [facility] [phone]   [facility] [phone]   [facility] [phone]   [facility] [phone]   [facility] [phone]   [facility] [phone]    [facility] [phone]   [facility] [phone]   [facility] [phone]   [facility] [phone]

## 2024-11-01 DIAGNOSIS — R09.02 HYPOXIA: ICD-10-CM

## 2024-11-02 RX ORDER — ATORVASTATIN CALCIUM 10 MG/1
10 TABLET, FILM COATED ORAL
Qty: 90 TABLET | Refills: 0 | Status: SHIPPED | OUTPATIENT
Start: 2024-11-02

## 2024-11-03 ENCOUNTER — TELEPHONE (OUTPATIENT)
Dept: OTHER | Facility: OTHER | Age: 79
End: 2024-11-03

## 2024-11-03 NOTE — TELEPHONE ENCOUNTER
Patient's daughter  is calling regarding cancelling an appointment.    Date/Time: 11/04/2024  10:00 am    Patient was rescheduled: YES [] NO [x]    Patient requesting call back to reschedule: YES [x] NO []      Daughter would like patient to be called to r/s appointment

## 2024-11-04 LAB
DME PARACHUTE DELIVERY DATE ACTUAL: NORMAL
DME PARACHUTE DELIVERY DATE REQUESTED: NORMAL
DME PARACHUTE DELIVERY NOTE: NORMAL
DME PARACHUTE ITEM DESCRIPTION: NORMAL
DME PARACHUTE ORDER STATUS: NORMAL
DME PARACHUTE SUPPLIER NAME: NORMAL
DME PARACHUTE SUPPLIER PHONE: NORMAL

## 2024-11-07 ENCOUNTER — OFFICE VISIT (OUTPATIENT)
Dept: FAMILY MEDICINE CLINIC | Facility: CLINIC | Age: 79
End: 2024-11-07
Payer: COMMERCIAL

## 2024-11-07 VITALS
DIASTOLIC BLOOD PRESSURE: 66 MMHG | SYSTOLIC BLOOD PRESSURE: 138 MMHG | OXYGEN SATURATION: 92 % | HEART RATE: 77 BPM | RESPIRATION RATE: 20 BRPM | TEMPERATURE: 99 F | WEIGHT: 175.2 LBS | BODY MASS INDEX: 33.08 KG/M2 | HEIGHT: 61 IN

## 2024-11-07 DIAGNOSIS — R65.20 SEPSIS WITH ACUTE HYPOXIC RESPIRATORY FAILURE WITHOUT SEPTIC SHOCK, DUE TO UNSPECIFIED ORGANISM (HCC): ICD-10-CM

## 2024-11-07 DIAGNOSIS — Z23 NEED FOR COVID-19 VACCINE: ICD-10-CM

## 2024-11-07 DIAGNOSIS — A41.9 SEPSIS WITH ACUTE HYPOXIC RESPIRATORY FAILURE WITHOUT SEPTIC SHOCK, DUE TO UNSPECIFIED ORGANISM (HCC): ICD-10-CM

## 2024-11-07 DIAGNOSIS — I10 ESSENTIAL HYPERTENSION: Chronic | ICD-10-CM

## 2024-11-07 DIAGNOSIS — E78.2 MIXED HYPERLIPIDEMIA: ICD-10-CM

## 2024-11-07 DIAGNOSIS — J96.01 SEPSIS WITH ACUTE HYPOXIC RESPIRATORY FAILURE WITHOUT SEPTIC SHOCK, DUE TO UNSPECIFIED ORGANISM (HCC): ICD-10-CM

## 2024-11-07 DIAGNOSIS — J96.01 ACUTE RESPIRATORY FAILURE WITH HYPOXIA (HCC): Primary | ICD-10-CM

## 2024-11-07 PROCEDURE — 90480 ADMN SARSCOV2 VAC 1/ONLY CMP: CPT

## 2024-11-07 PROCEDURE — 91320 SARSCV2 VAC 30MCG TRS-SUC IM: CPT

## 2024-11-07 PROCEDURE — 99495 TRANSJ CARE MGMT MOD F2F 14D: CPT | Performed by: FAMILY MEDICINE

## 2024-11-07 NOTE — ASSESSMENT & PLAN NOTE
Post overall sepsis with acute respiratory failure seen in transition of care management overall improved now at home off oxygen.  She has an inhaler to use as needed basis which I encouraged her to keep with her and begin a walking program daily to help with lung function.  She was hospital lysed from October 23 through 28 for pneumonia after the family reported increased confusion and headaches she was given antibiotics and supplemental oxygen and recovered overall very well and her improvement in mentation came back to her baseline was sent home on 2 L of oxygen and after 3 to 4 days at home her O2 saturations improved and she is off oxygen now at this time doing better overall.

## 2024-11-07 NOTE — ASSESSMENT & PLAN NOTE
Posthospitalization patient seen in transition of care management here doing better overall off oxygen now she will use an inhaler as needed basis at this point and I recommend walking daily at least 30 minutes/day

## 2024-11-07 NOTE — PROGRESS NOTES
Transition of Care Visit  Name: Lona Cedeno      : 1945      MRN: [id number]  Encounter Provider: Vivek Preston DO  Encounter Date: 2024   Encounter department: [facility]    Assessment & Plan  Acute respiratory failure with hypoxia (HCC)  Posthospitalization patient seen in transition of care management here doing better overall off oxygen now she will use an inhaler as needed basis at this point and I recommend walking daily at least 30 minutes/day         Sepsis with acute hypoxic respiratory failure without septic shock, due to unspecified organism (HCC)  Post overall sepsis with acute respiratory failure seen in transition of care management overall improved now at home off oxygen.  She has an inhaler to use as needed basis which I encouraged her to keep with her and begin a walking program daily to help with lung function.  She was hospital lysed from  through  for pneumonia after the family reported increased confusion and headaches she was given antibiotics and supplemental oxygen and recovered overall very well and her improvement in mentation came back to her baseline was sent home on 2 L of oxygen and after 3 to 4 days at home her O2 saturations improved and she is off oxygen now at this time doing better overall.         Essential hypertension  Stable blood pressure continuing on same medications now after discharge from hospital lisinopril will continue at 5 mg daily         Need for COVID-19 vaccine    Orders:    COVID-19 Pfizer mRNA vaccine 12 yr and older (Comirnaty pre-filled syringe)    Mixed hyperlipidemia    Orders:    Comprehensive metabolic panel; Future    CBC and differential; Future    Lipid panel; Future    TSH, 3rd generation with Free T4 reflex; Future         History of Present Illness     Transitional Care Management Review:   Lona Cedeno is a 79 y.o. female here for TCM follow up.     During the TCM phone call patient  stated:  TCM Call       Date and time call was made  10/29/2024  9:56 AM    Hospital care reviewed  Records not available    Patient was hospitial[facility] at  [facility]    Date of Admission  10/23/24    Date of discharge  10/28/24    Diagnosis  Sepsis with acute respiratory failure    Disposition  Home    Were the patients medications reviewed and updated  No    Current Symptoms  None          TCM Call       Post hospital issues  None    Should patient be enrolled in anticoag monitoring?  No    Scheduled for follow up?  Yes    Did you obtain your prescribed medications  Yes    Do you need help managing your prescriptions or medications  No    Is transportation to your appointment needed  No    I have advised the patient to call PCP with any new or worsening symptoms  ZULMA ADDISON    Living Arrangements  Family members          Follow-up evaluation transition of care management posthospitalization after sepsis and respiratory failure      Review of Systems   Constitutional:  Negative for chills, fatigue and fever.   HENT:  Negative for congestion, nosebleeds, rhinorrhea, sinus pressure and sore throat.    Eyes:  Negative for discharge and redness.   Respiratory:  Negative for cough and shortness of breath.    Cardiovascular:  Negative for chest pain, palpitations and leg swelling.   Gastrointestinal:  Negative for abdominal pain, blood in stool and nausea.   Endocrine: Negative for cold intolerance, heat intolerance and polyuria.   Genitourinary:  Negative for dysuria and frequency.   Musculoskeletal:  Negative for arthralgias, back pain and myalgias.   Skin:  Negative for rash.   Neurological:  Negative for dizziness, weakness and headaches.   Hematological:  Negative for adenopathy.   Psychiatric/Behavioral:  Negative for behavioral problems and sleep disturbance. The patient is not nervous/anxious.      Objective     /66 (BP Location: Left arm, Patient Position: Sitting, Cuff Size: Large)   Pulse 77    "Temp 99 °F (37.2 °C) (Tympanic)   Resp 20   Ht 5' 1\" (1.549 m)   Wt 79.5 kg (175 lb 3.2 oz)   SpO2 92%   BMI 33.10 kg/m²     Physical Exam  Vitals and nursing note reviewed.   Constitutional:       General: She is not in acute distress.     Appearance: Normal appearance. She is well-developed.   HENT:      Head: Normocephalic and atraumatic.      Right Ear: Tympanic membrane and external ear normal.      Left Ear: Tympanic membrane and external ear normal.      Nose: Nose normal.      Mouth/Throat:      Mouth: Mucous membranes are moist.      Pharynx: Oropharynx is clear. No oropharyngeal exudate.   Eyes:      General: No scleral icterus.        Right eye: No discharge.         Left eye: No discharge.      Conjunctiva/sclera: Conjunctivae normal.      Pupils: Pupils are equal, round, and reactive to light.   Neck:      Thyroid: No thyromegaly.      Vascular: No JVD.   Cardiovascular:      Rate and Rhythm: Normal rate and regular rhythm.      Pulses: Normal pulses.      Heart sounds: Normal heart sounds. No murmur heard.  Pulmonary:      Effort: Pulmonary effort is normal.      Breath sounds: No wheezing or rales.   Chest:      Chest wall: No tenderness.   Abdominal:      General: Bowel sounds are normal. There is no distension.      Palpations: Abdomen is soft. There is no mass.      Tenderness: There is no abdominal tenderness.   Musculoskeletal:         General: No tenderness or deformity. Normal range of motion.      Cervical back: Normal range of motion.   Lymphadenopathy:      Cervical: No cervical adenopathy.   Skin:     General: Skin is warm and dry.      Findings: No rash.   Neurological:      General: No focal deficit present.      Mental Status: She is alert and oriented to person, place, and time.      Cranial Nerves: No cranial nerve deficit.      Coordination: Coordination normal.      Deep Tendon Reflexes: Reflexes are normal and symmetric. Reflexes normal.   Psychiatric:         Mood and Affect: " Mood normal.         Behavior: Behavior normal.         Thought Content: Thought content normal.         Judgment: Judgment normal.       Medications have been reviewed by provider in current encounter

## 2024-11-07 NOTE — ASSESSMENT & PLAN NOTE
Stable blood pressure continuing on same medications now after discharge from hospital lisinopril will continue at 5 mg daily

## 2024-11-14 DIAGNOSIS — E11.42 TYPE 2 DIABETES MELLITUS WITH DIABETIC POLYNEUROPATHY, WITHOUT LONG-TERM CURRENT USE OF INSULIN (HCC): Chronic | ICD-10-CM

## 2024-11-14 RX ORDER — SITAGLIPTIN 100 MG/1
100 TABLET, FILM COATED ORAL DAILY
Qty: 90 TABLET | Refills: 3 | Status: SHIPPED | OUTPATIENT
Start: 2024-11-14

## 2024-11-18 DIAGNOSIS — E11.42 TYPE 2 DIABETES MELLITUS WITH DIABETIC POLYNEUROPATHY, WITHOUT LONG-TERM CURRENT USE OF INSULIN (HCC): Chronic | ICD-10-CM

## 2024-11-19 NOTE — OCCUPATIONAL THERAPY NOTE
Occupational Therapy Progress Note     Patient Name: Lona Cedeno  Today's Date: 2/8/2024  Problem List  Principal Problem:    Diabetic foot ulcer with osteomyelitis (HCC)  Active Problems:    Type 2 diabetes mellitus with diabetic polyneuropathy (HCC)    Essential hypertension    Anxiety and depression    PAD (peripheral artery disease) (HCC)    COPD, severity to be determined (HCC)    Tobacco use disorder    Constipation    Hematoma    Anemia            02/08/24 1037   OT Last Visit   OT Visit Date 02/08/24   Note Type   Note Type Treatment for insurance authorization   Pain Assessment   Pain Assessment Tool 0-10   Pain Score No Pain   Restrictions/Precautions   Weight Bearing Precautions Per Order Yes   RLE Weight Bearing Per Order NWB   Braces or Orthoses   (prevlon boot RLE)   Other Precautions WBS;Cognitive;Chair Alarm;Bed Alarm;Fall Risk;Pain   ADL   Where Assessed Edge of bed   UB Bathing Assistance 4  Minimal Assistance   UB Bathing Deficit Setup;Increased time to complete;Right arm;Left arm;Abdomen   LB Bathing Assistance 3  Moderate Assistance   LB Bathing Deficit Right upper leg;Left upper leg;Left lower leg including foot;Buttocks   LB Bathing Comments mod A for lower LE, able to manage upper LE's   UB Dressing Assistance 4  Minimal Assistance   UB Dressing Deficit Thread RUE;Thread LUE   UB Dressing Comments min A threading RUE, 2' previous shoulder replacement.   LB Dressing Assistance 4  Minimal Assistance   LB Dressing Deficit Don/doff L sock   LB Dressing Comments pt able to apollo L sock by bringing L foot up to R knee. she requires A for threading LE's into underwear, is able to pull them up in stance, but requires assistance to stabilize while doing so.   Functional Standing Tolerance   Time 3 min   Activity standing for LB dressing and inc standing balance during fxnl activity.   Comments pt requires min-mod A to maintain upright posture if reaching outside of  DIONICIO.   Transfers   Sit to Stand  Physical Therapy Daily Treatment Note  [facility]  [address] [place] [address]     Patient: Campbell Alex   : 1937   Referring practitioner: Rocky Lozoya DO  Date of initial visit: Type: THERAPY  Noted: 2024   Today's date: 2024   Patient seen for 48 visits    Visit Diagnoses:    ICD-10-CM ICD-9-CM   1. Low back pain, unspecified back pain laterality, unspecified chronicity, unspecified whether sciatica present  M54.50 724.2   2. Bilateral leg weakness  R29.898 729.89   3. Mobility impaired  Z74.09 799.89   4. History of lumbar laminectomy  Z98.890 V45.89       Subjective   Pt reports: Feeling under the weather the last few days, limiting his overall activity. HEP going well. No recent falls.      Objective     See Exercise, Manual, and Modality Logs for complete treatment.     Patient Education: HEP    Assessment/Plan Fatigued post visit. Balance remains limited.      Progress per Plan of Care            Timed:         Manual Therapy:         mins  34436;     Therapeutic Exercise:    15     mins  51301;     Neuromuscular Agustina:    15    mins  27369;    Therapeutic Activity:    10      mins  88219;     Gait Training:           mins  84170;     Ultrasound:          mins  61575;    Ionto                                   mins   48330  Self Care                            mins   88634    Un-Timed:  Electrical Stimulation:         mins  91664 ( );  Traction          mins 10648  Low Eval          Mins  71769  Mod Eval          Mins  57285  High Eval                            Mins  56739  Re-Eval                               mins  56482    Timed Treatment:   40   mins   Total Treatment:     40   mins      Daniel Joyner PT, DPT, OCS  [place] license: [id number]  Physical Therapist   4  Minimal assistance   Additional items Assist x 1;Increased time required;Verbal cues   Stand to Sit 4  Minimal assistance   Additional items Assist x 1;Increased time required;Verbal cues   Additional Comments rw, continues to require vc for adherence to WBS   Functional Mobility   Functional Mobility 4  Minimal assistance   Additional Comments ax1, small hops from EOB>chair. max vc for adherence to wbs.   Additional items Rolling walker   Cognition   Overall Cognitive Status Impaired   Arousal/Participation Alert;Responsive   Attention Attends with cues to redirect   Orientation Level Oriented X4   Memory Decreased recall of precautions   Following Commands Follows one step commands with increased time or repetition   Comments requires inc time to follow one step commands, has overall reduced safety awareness and insight to condition. also requires inc time to process commands during session.   Activity Tolerance   Activity Tolerance Patient limited by fatigue   Medical Staff Made Aware ok per RN   Assessment   Assessment Pt seen on this date for OT session focusing on ADL retraining, cognitive reorientation, body mechanics, transfer retraining, increasing activity tolerance/endurance and EOB sitting to increase ability to participate in ADL/functional tasks. Pt was found in bed and was left in bed w/ all needs within reach, bed alarm on. Pt completed transfers and FM w min Ax1 c rw, hops to and from bedside chair. UB ADL w min A, LB ADL w mod A. Engaged in fxnl standing tolerance activity, which included reaching outside of DIONICIO. She requires mod A to maintain upright posture when reaching outside of DIONICIO. Pt requires inc time to follow one step commands, decreased processing speeds also noted. Pt w/ improvements in activity tolerance, transfer ability, adl task completion however is still limited 2* decreased ADL/High-level ADL status, decreased activity tolerance/endurance, decreased cognition, decreased  self-care trans, decreased safety awareness and insight to condition.   The patient's raw score on the AM-PAC Daily Activity Inpatient Short Form is 15. A raw score of less than 19 suggests the patient may benefit from discharge to post-acute rehabilitation services. Please refer to the recommendation of the Occupational Therapist for safe discharge planning.  Recommending pt D/C to STR when medically stable. Pt will continue to benefit from acute OT services to meet goals.   Plan   Treatment Interventions ADL retraining;Functional transfer training;Endurance training;Patient/family training;Equipment evaluation/education;Compensatory technique education;Energy conservation;Activityengagement   Goal Expiration Date 02/12/24   OT Treatment Day 4   OT Frequency 2-3x/wk   Discharge Recommendation   Rehab Resource Intensity Level, OT I (Maximum Resource Intensity)   AM-PAC Daily Activity Inpatient   Lower Body Dressing 2   Bathing 2   Toileting 2   Upper Body Dressing 3   Grooming 3   Eating 3   Daily Activity Raw Score 15   Daily Activity Standardized Score (Calc for Raw Score >=11) 34.69   AM-PAC Applied Cognition Inpatient   Following a Speech/Presentation 4   Understanding Ordinary Conversation 4   Taking Medications 4   Remembering Where Things Are Placed or Put Away 4   Remembering List of 4-5 Errands 4   Taking Care of Complicated Tasks 4   Applied Cognition Raw Score 24   Applied Cognition Standardized Score 62.21   Modified Landen Scale   Modified [place] Scale 4   End of Consult   Education Provided Yes   Patient Position at End of Consult Seated edge of bed;All needs within reach;Bed/Chair alarm activated   Nurse Communication Nurse aware of consult       MINDY Wooten, OTR/L

## 2024-11-20 RX ORDER — PREGABALIN 200 MG/1
200 CAPSULE ORAL 3 TIMES DAILY
Qty: 90 CAPSULE | Refills: 0 | Status: SHIPPED | OUTPATIENT
Start: 2024-11-20

## 2024-11-23 PROBLEM — R65.20 SEPSIS WITH ACUTE RESPIRATORY FAILURE (HCC): Status: RESOLVED | Noted: 2024-02-13 | Resolved: 2024-11-23

## 2024-11-23 PROBLEM — J18.9 COMMUNITY ACQUIRED PNEUMONIA OF RIGHT LOWER LOBE OF LUNG: Status: RESOLVED | Noted: 2024-07-17 | Resolved: 2024-11-23

## 2024-11-23 PROBLEM — J96.00 SEPSIS WITH ACUTE RESPIRATORY FAILURE (HCC): Status: RESOLVED | Noted: 2024-02-13 | Resolved: 2024-11-23

## 2024-11-23 PROBLEM — A41.9 SEPSIS WITH ACUTE RESPIRATORY FAILURE (HCC): Status: RESOLVED | Noted: 2024-02-13 | Resolved: 2024-11-23

## 2024-12-04 ENCOUNTER — APPOINTMENT (EMERGENCY)
Dept: CT IMAGING | Facility: HOSPITAL | Age: 79
DRG: 481 | End: 2024-12-04
Payer: COMMERCIAL

## 2024-12-04 ENCOUNTER — HOSPITAL ENCOUNTER (INPATIENT)
Facility: HOSPITAL | Age: 79
LOS: 7 days | Discharge: NURSING, REHAB OR LONG-TERM CARE | DRG: 481 | End: 2024-12-11
Attending: EMERGENCY MEDICINE | Admitting: INTERNAL MEDICINE
Payer: COMMERCIAL

## 2024-12-04 ENCOUNTER — APPOINTMENT (EMERGENCY)
Dept: RADIOLOGY | Facility: HOSPITAL | Age: 79
DRG: 481 | End: 2024-12-04
Payer: COMMERCIAL

## 2024-12-04 DIAGNOSIS — S72.002A CLOSED FRACTURE OF LEFT HIP, INITIAL ENCOUNTER (HCC): Primary | ICD-10-CM

## 2024-12-04 DIAGNOSIS — I42.9 CARDIOMYOPATHY (HCC): ICD-10-CM

## 2024-12-04 DIAGNOSIS — S91.301D OPEN WOUND OF RIGHT FOOT, SUBSEQUENT ENCOUNTER: ICD-10-CM

## 2024-12-04 DIAGNOSIS — S72.002A CLOSED LEFT HIP FRACTURE, INITIAL ENCOUNTER (HCC): ICD-10-CM

## 2024-12-04 PROBLEM — R65.10 SIRS (SYSTEMIC INFLAMMATORY RESPONSE SYNDROME) (HCC): Status: ACTIVE | Noted: 2024-12-04

## 2024-12-04 PROBLEM — R06.89 ACUTE RESPIRATORY INSUFFICIENCY: Status: ACTIVE | Noted: 2024-12-04

## 2024-12-04 PROBLEM — Z01.818 PREOPERATIVE CLEARANCE: Status: ACTIVE | Noted: 2024-12-04

## 2024-12-04 LAB
ABO GROUP BLD: NORMAL
ALBUMIN SERPL BCG-MCNC: 4.4 G/DL (ref 3.5–5)
ALP SERPL-CCNC: 60 U/L (ref 34–104)
ALT SERPL W P-5'-P-CCNC: 16 U/L (ref 7–52)
ANION GAP SERPL CALCULATED.3IONS-SCNC: 4 MMOL/L (ref 4–13)
ANION GAP SERPL CALCULATED.3IONS-SCNC: 6 MMOL/L (ref 4–13)
APTT PPP: 29 SECONDS (ref 23–34)
AST SERPL W P-5'-P-CCNC: 31 U/L (ref 13–39)
BASOPHILS # BLD AUTO: 0.03 THOUSANDS/ÂΜL (ref 0–0.1)
BASOPHILS NFR BLD AUTO: 0 % (ref 0–1)
BILIRUB SERPL-MCNC: 0.65 MG/DL (ref 0.2–1)
BILIRUB UR QL STRIP: NEGATIVE
BLD GP AB SCN SERPL QL: NEGATIVE
BUN SERPL-MCNC: 16 MG/DL (ref 5–25)
BUN SERPL-MCNC: 18 MG/DL (ref 5–25)
CALCIUM SERPL-MCNC: 10.2 MG/DL (ref 8.4–10.2)
CALCIUM SERPL-MCNC: 9.7 MG/DL (ref 8.4–10.2)
CHLORIDE SERPL-SCNC: 100 MMOL/L (ref 96–108)
CHLORIDE SERPL-SCNC: 98 MMOL/L (ref 96–108)
CK SERPL-CCNC: 223 U/L (ref 26–192)
CLARITY UR: CLEAR
CO2 SERPL-SCNC: 27 MMOL/L (ref 21–32)
CO2 SERPL-SCNC: 29 MMOL/L (ref 21–32)
COLOR UR: YELLOW
CREAT SERPL-MCNC: 0.6 MG/DL (ref 0.6–1.3)
CREAT SERPL-MCNC: 0.73 MG/DL (ref 0.6–1.3)
EOSINOPHIL # BLD AUTO: 0.08 THOUSAND/ÂΜL (ref 0–0.61)
EOSINOPHIL NFR BLD AUTO: 1 % (ref 0–6)
ERYTHROCYTE [DISTWIDTH] IN BLOOD BY AUTOMATED COUNT: 14.8 % (ref 11.6–15.1)
GFR SERPL CREATININE-BSD FRML MDRD: 79 ML/MIN/1.73SQ M
GFR SERPL CREATININE-BSD FRML MDRD: 87 ML/MIN/1.73SQ M
GLUCOSE SERPL-MCNC: 129 MG/DL (ref 65–140)
GLUCOSE SERPL-MCNC: 142 MG/DL (ref 65–140)
GLUCOSE SERPL-MCNC: 147 MG/DL (ref 65–140)
GLUCOSE SERPL-MCNC: 203 MG/DL (ref 65–140)
GLUCOSE UR STRIP-MCNC: NEGATIVE MG/DL
HCT VFR BLD AUTO: 41.1 % (ref 34.8–46.1)
HGB BLD-MCNC: 13 G/DL (ref 11.5–15.4)
HGB UR QL STRIP.AUTO: NEGATIVE
IMM GRANULOCYTES # BLD AUTO: 0.09 THOUSAND/UL (ref 0–0.2)
IMM GRANULOCYTES NFR BLD AUTO: 1 % (ref 0–2)
INR PPP: 1.14 (ref 0.85–1.19)
KETONES UR STRIP-MCNC: NEGATIVE MG/DL
LEUKOCYTE ESTERASE UR QL STRIP: NEGATIVE
LYMPHOCYTES # BLD AUTO: 0.78 THOUSANDS/ÂΜL (ref 0.6–4.47)
LYMPHOCYTES NFR BLD AUTO: 5 % (ref 14–44)
MCH RBC QN AUTO: 29.7 PG (ref 26.8–34.3)
MCHC RBC AUTO-ENTMCNC: 31.6 G/DL (ref 31.4–37.4)
MCV RBC AUTO: 94 FL (ref 82–98)
MONOCYTES # BLD AUTO: 1.11 THOUSAND/ÂΜL (ref 0.17–1.22)
MONOCYTES NFR BLD AUTO: 7 % (ref 4–12)
NEUTROPHILS # BLD AUTO: 14.64 THOUSANDS/ÂΜL (ref 1.85–7.62)
NEUTS SEG NFR BLD AUTO: 86 % (ref 43–75)
NITRITE UR QL STRIP: NEGATIVE
NRBC BLD AUTO-RTO: 0 /100 WBCS
PH UR STRIP.AUTO: 6 [PH]
PLATELET # BLD AUTO: 260 THOUSANDS/UL (ref 149–390)
PMV BLD AUTO: 9.7 FL (ref 8.9–12.7)
POTASSIUM SERPL-SCNC: 5.3 MMOL/L (ref 3.5–5.3)
POTASSIUM SERPL-SCNC: 5.6 MMOL/L (ref 3.5–5.3)
PROT SERPL-MCNC: 7.5 G/DL (ref 6.4–8.4)
PROT UR STRIP-MCNC: NEGATIVE MG/DL
PROTHROMBIN TIME: 15.1 SECONDS (ref 12.3–15)
RBC # BLD AUTO: 4.38 MILLION/UL (ref 3.81–5.12)
RH BLD: POSITIVE
SODIUM SERPL-SCNC: 131 MMOL/L (ref 135–147)
SODIUM SERPL-SCNC: 133 MMOL/L (ref 135–147)
SP GR UR STRIP.AUTO: 1.02
SPECIMEN EXPIRATION DATE: NORMAL
UROBILINOGEN UR QL STRIP.AUTO: 0.2 E.U./DL
WBC # BLD AUTO: 16.73 THOUSAND/UL (ref 4.31–10.16)

## 2024-12-04 PROCEDURE — 99222 1ST HOSP IP/OBS MODERATE 55: CPT | Performed by: INTERNAL MEDICINE

## 2024-12-04 PROCEDURE — 70450 CT HEAD/BRAIN W/O DYE: CPT

## 2024-12-04 PROCEDURE — 82948 REAGENT STRIP/BLOOD GLUCOSE: CPT

## 2024-12-04 PROCEDURE — 80053 COMPREHEN METABOLIC PANEL: CPT | Performed by: EMERGENCY MEDICINE

## 2024-12-04 PROCEDURE — 93005 ELECTROCARDIOGRAM TRACING: CPT

## 2024-12-04 PROCEDURE — 74177 CT ABD & PELVIS W/CONTRAST: CPT

## 2024-12-04 PROCEDURE — 0QS736Z REPOSITION LEFT UPPER FEMUR WITH INTRAMEDULLARY INTERNAL FIXATION DEVICE, PERCUTANEOUS APPROACH: ICD-10-PCS | Performed by: STUDENT IN AN ORGANIZED HEALTH CARE EDUCATION/TRAINING PROGRAM

## 2024-12-04 PROCEDURE — 99285 EMERGENCY DEPT VISIT HI MDM: CPT | Performed by: EMERGENCY MEDICINE

## 2024-12-04 PROCEDURE — 82550 ASSAY OF CK (CPK): CPT | Performed by: NURSE PRACTITIONER

## 2024-12-04 PROCEDURE — 36415 COLL VENOUS BLD VENIPUNCTURE: CPT | Performed by: EMERGENCY MEDICINE

## 2024-12-04 PROCEDURE — 72170 X-RAY EXAM OF PELVIS: CPT

## 2024-12-04 PROCEDURE — 85025 COMPLETE CBC W/AUTO DIFF WBC: CPT | Performed by: EMERGENCY MEDICINE

## 2024-12-04 PROCEDURE — 71045 X-RAY EXAM CHEST 1 VIEW: CPT

## 2024-12-04 PROCEDURE — 99285 EMERGENCY DEPT VISIT HI MDM: CPT

## 2024-12-04 PROCEDURE — 80048 BASIC METABOLIC PNL TOTAL CA: CPT | Performed by: NURSE PRACTITIONER

## 2024-12-04 PROCEDURE — 96375 TX/PRO/DX INJ NEW DRUG ADDON: CPT

## 2024-12-04 PROCEDURE — 81003 URINALYSIS AUTO W/O SCOPE: CPT | Performed by: NURSE PRACTITIONER

## 2024-12-04 PROCEDURE — 86900 BLOOD TYPING SEROLOGIC ABO: CPT | Performed by: EMERGENCY MEDICINE

## 2024-12-04 PROCEDURE — 86850 RBC ANTIBODY SCREEN: CPT | Performed by: EMERGENCY MEDICINE

## 2024-12-04 PROCEDURE — 96365 THER/PROPH/DIAG IV INF INIT: CPT

## 2024-12-04 PROCEDURE — 85610 PROTHROMBIN TIME: CPT | Performed by: EMERGENCY MEDICINE

## 2024-12-04 PROCEDURE — 86901 BLOOD TYPING SEROLOGIC RH(D): CPT | Performed by: EMERGENCY MEDICINE

## 2024-12-04 PROCEDURE — 85730 THROMBOPLASTIN TIME PARTIAL: CPT | Performed by: EMERGENCY MEDICINE

## 2024-12-04 RX ORDER — INSULIN LISPRO 100 [IU]/ML
1-6 INJECTION, SOLUTION INTRAVENOUS; SUBCUTANEOUS
Status: DISCONTINUED | OUTPATIENT
Start: 2024-12-04 | End: 2024-12-11 | Stop reason: HOSPADM

## 2024-12-04 RX ORDER — OXYBUTYNIN CHLORIDE 5 MG/1
5 TABLET, EXTENDED RELEASE ORAL DAILY
Status: DISCONTINUED | OUTPATIENT
Start: 2024-12-05 | End: 2024-12-05

## 2024-12-04 RX ORDER — ALBUTEROL SULFATE 90 UG/1
2 INHALANT RESPIRATORY (INHALATION) EVERY 6 HOURS PRN
Status: DISCONTINUED | OUTPATIENT
Start: 2024-12-04 | End: 2024-12-11 | Stop reason: HOSPADM

## 2024-12-04 RX ORDER — HYDROMORPHONE HCL IN WATER/PF 6 MG/30 ML
0.2 PATIENT CONTROLLED ANALGESIA SYRINGE INTRAVENOUS ONCE
Status: COMPLETED | OUTPATIENT
Start: 2024-12-04 | End: 2024-12-04

## 2024-12-04 RX ORDER — LISINOPRIL 5 MG/1
5 TABLET ORAL DAILY
Status: DISCONTINUED | OUTPATIENT
Start: 2024-12-05 | End: 2024-12-04

## 2024-12-04 RX ORDER — TRANEXAMIC ACID 10 MG/ML
1000 INJECTION, SOLUTION INTRAVENOUS ONCE
Status: COMPLETED | OUTPATIENT
Start: 2024-12-04 | End: 2024-12-04

## 2024-12-04 RX ORDER — ONDANSETRON 2 MG/ML
4 INJECTION INTRAMUSCULAR; INTRAVENOUS EVERY 6 HOURS PRN
Status: DISCONTINUED | OUTPATIENT
Start: 2024-12-04 | End: 2024-12-11 | Stop reason: HOSPADM

## 2024-12-04 RX ORDER — POLYETHYLENE GLYCOL 3350 17 G/17G
17 POWDER, FOR SOLUTION ORAL DAILY
Status: DISCONTINUED | OUTPATIENT
Start: 2024-12-05 | End: 2024-12-11 | Stop reason: HOSPADM

## 2024-12-04 RX ORDER — PREGABALIN 100 MG/1
200 CAPSULE ORAL 3 TIMES DAILY
Status: DISCONTINUED | OUTPATIENT
Start: 2024-12-04 | End: 2024-12-11 | Stop reason: HOSPADM

## 2024-12-04 RX ORDER — CLONAZEPAM 1 MG/1
1 TABLET ORAL EVERY EVENING
Status: DISCONTINUED | OUTPATIENT
Start: 2024-12-04 | End: 2024-12-11 | Stop reason: HOSPADM

## 2024-12-04 RX ORDER — NICOTINE 21 MG/24HR
1 PATCH, TRANSDERMAL 24 HOURS TRANSDERMAL DAILY
Status: DISCONTINUED | OUTPATIENT
Start: 2024-12-05 | End: 2024-12-11 | Stop reason: HOSPADM

## 2024-12-04 RX ORDER — AMMONIUM LACTATE 12 G/100G
LOTION TOPICAL DAILY
Status: DISCONTINUED | OUTPATIENT
Start: 2024-12-05 | End: 2024-12-11 | Stop reason: HOSPADM

## 2024-12-04 RX ORDER — DOCUSATE SODIUM 100 MG/1
100 CAPSULE, LIQUID FILLED ORAL 2 TIMES DAILY
Status: DISCONTINUED | OUTPATIENT
Start: 2024-12-04 | End: 2024-12-11 | Stop reason: HOSPADM

## 2024-12-04 RX ORDER — SODIUM CHLORIDE 9 MG/ML
75 INJECTION, SOLUTION INTRAVENOUS CONTINUOUS
Status: DISCONTINUED | OUTPATIENT
Start: 2024-12-04 | End: 2024-12-05

## 2024-12-04 RX ORDER — OXYCODONE HYDROCHLORIDE 10 MG/1
10 TABLET ORAL EVERY 4 HOURS PRN
Refills: 0 | Status: DISCONTINUED | OUTPATIENT
Start: 2024-12-04 | End: 2024-12-11 | Stop reason: HOSPADM

## 2024-12-04 RX ORDER — HYDROMORPHONE HCL/PF 1 MG/ML
0.5 SYRINGE (ML) INJECTION ONCE
Status: DISCONTINUED | OUTPATIENT
Start: 2024-12-04 | End: 2024-12-04

## 2024-12-04 RX ORDER — ASPIRIN 81 MG/1
81 TABLET ORAL DAILY
Status: DISCONTINUED | OUTPATIENT
Start: 2024-12-05 | End: 2024-12-11 | Stop reason: HOSPADM

## 2024-12-04 RX ORDER — OXYCODONE HYDROCHLORIDE 5 MG/1
5 TABLET ORAL EVERY 4 HOURS PRN
Refills: 0 | Status: DISCONTINUED | OUTPATIENT
Start: 2024-12-04 | End: 2024-12-11 | Stop reason: HOSPADM

## 2024-12-04 RX ORDER — BUPROPION HYDROCHLORIDE 150 MG/1
300 TABLET ORAL DAILY
Status: DISCONTINUED | OUTPATIENT
Start: 2024-12-05 | End: 2024-12-11 | Stop reason: HOSPADM

## 2024-12-04 RX ORDER — PANTOPRAZOLE SODIUM 40 MG/1
40 TABLET, DELAYED RELEASE ORAL DAILY
Status: DISCONTINUED | OUTPATIENT
Start: 2024-12-05 | End: 2024-12-11 | Stop reason: HOSPADM

## 2024-12-04 RX ORDER — ATORVASTATIN CALCIUM 10 MG/1
10 TABLET, FILM COATED ORAL
Status: DISCONTINUED | OUTPATIENT
Start: 2024-12-04 | End: 2024-12-11 | Stop reason: HOSPADM

## 2024-12-04 RX ORDER — FLUTICASONE FUROATE AND VILANTEROL 100; 25 UG/1; UG/1
1 POWDER RESPIRATORY (INHALATION) DAILY
Status: DISCONTINUED | OUTPATIENT
Start: 2024-12-05 | End: 2024-12-11 | Stop reason: HOSPADM

## 2024-12-04 RX ORDER — ACETAMINOPHEN 325 MG/1
975 TABLET ORAL EVERY 8 HOURS SCHEDULED
Status: DISCONTINUED | OUTPATIENT
Start: 2024-12-04 | End: 2024-12-11 | Stop reason: HOSPADM

## 2024-12-04 RX ADMIN — IOHEXOL 100 ML: 350 INJECTION, SOLUTION INTRAVENOUS at 15:39

## 2024-12-04 RX ADMIN — ACETAMINOPHEN 975 MG: 325 TABLET ORAL at 22:57

## 2024-12-04 RX ADMIN — HYDROMORPHONE HYDROCHLORIDE 0.2 MG: 0.2 INJECTION, SOLUTION INTRAMUSCULAR; INTRAVENOUS; SUBCUTANEOUS at 15:19

## 2024-12-04 RX ADMIN — TRANEXAMIC ACID 1000 MG: 1 INJECTION, SOLUTION INTRAVENOUS at 15:49

## 2024-12-04 RX ADMIN — SODIUM CHLORIDE 75 ML/HR: 0.9 INJECTION, SOLUTION INTRAVENOUS at 18:11

## 2024-12-04 RX ADMIN — PREGABALIN 200 MG: 100 CAPSULE ORAL at 22:57

## 2024-12-04 RX ADMIN — INSULIN LISPRO 2 UNITS: 100 INJECTION, SOLUTION INTRAVENOUS; SUBCUTANEOUS at 21:38

## 2024-12-04 RX ADMIN — PREGABALIN 200 MG: 100 CAPSULE ORAL at 18:16

## 2024-12-04 RX ADMIN — ACETAMINOPHEN 975 MG: 325 TABLET ORAL at 18:17

## 2024-12-04 RX ADMIN — TRANEXAMIC ACID 1000 MG: 10 INJECTION, SOLUTION INTRAVENOUS at 15:35

## 2024-12-04 RX ADMIN — DOCUSATE SODIUM 100 MG: 100 CAPSULE, LIQUID FILLED ORAL at 18:16

## 2024-12-04 RX ADMIN — ATORVASTATIN CALCIUM 10 MG: 10 TABLET, FILM COATED ORAL at 18:16

## 2024-12-04 RX ADMIN — CLONAZEPAM 1 MG: 1 TABLET ORAL at 18:16

## 2024-12-04 NOTE — ASSESSMENT & PLAN NOTE
Noted SpO2 of 86% on room air in the ED. The patient chronically wears 2 L nasal cannula intermittently with exertion.  Cxr- no acute cardiopulmonary finding  Continue with oxygen supplement  Monitor to keep SpO2 greater than 88%.  Incentive spirometry

## 2024-12-04 NOTE — ASSESSMENT & PLAN NOTE
On aspirin 81 mg daily,Xarelto 2.5 mg twice daily and statin   Will hold ASA and xarelto for now pending orthopedics surgery  Continue statin

## 2024-12-04 NOTE — ASSESSMENT & PLAN NOTE
BP is relatively controlled. Some hypotension noted in ED.   Will hold off amiloride and lisinopril for now   Monitor BP closely and re-initiate medications as appropriate

## 2024-12-04 NOTE — ASSESSMENT & PLAN NOTE
Lab Results   Component Value Date    HGBA1C 7.4 (H) 10/24/2024       Recent Labs     12/04/24  1746   POCGLU 129       Blood Sugar Average: Last 72 hrs:  (P) 129  Hold metformin and Januvia from home  SSI  Diabetic diet  Continue Lyrica for polyneuropathy.

## 2024-12-04 NOTE — ED PROVIDER NOTES
"Emergency Department Trauma Note  Lona Cedeno 79 y.o. female MRN: [id number]  Unit/Bed#: ED 02/ED 02 Encounter: [id number]      Trauma Alert: Trauma Acuity: Trauma Evaluation  Model of Arrival: Mode of Arrival: Direct from scene via    Trauma Team: Current Providers  Attending Provider: Zafar Conway DO  Attending Provider: Charley Thomas MD  Registered Nurse: Daniel Frey RN  Advanced Practitioner: ROXANE Carlin  Consultants:     None      History of Present Illness     Chief Complaint:   Chief Complaint   Patient presents with    Fall     According to the patient she had lost her balance and fell and laid on the floor for \"awhile\" this morning.  Patient reports pain in the left hip.     HPI:  Lona Cedeno is a 79 y.o. female who presents with .  Mechanism:Details of Incident: Patient lost balance and fell from a standing position. Injury Date: 12/04/24        Patient is a 79-year-old female who presents for evaluation of mechanical fall.  Patient says she was walking with her cane when she lost her balance and landed on her left hip.  She denies head strike or LOC.  She is on aspirin and Xarelto.  She is complaining of pain from the left hip down to her toes.  She denies any numbness or tingling in the leg.  She denies headache, legs, knees, nausea, vomit, neck pain, back pain, chest pain, shortness of breath or abdominal pain.      Review of Systems   Constitutional:  Negative for fever and unexpected weight change.   HENT:  Negative for congestion, ear pain, sore throat and trouble swallowing.    Eyes:  Negative for pain and redness.   Respiratory:  Negative for cough, chest tightness and shortness of breath.    Cardiovascular:  Negative for chest pain and leg swelling.   Gastrointestinal:  Negative for abdominal distention, abdominal pain, diarrhea and vomiting.   Endocrine: Negative for polyuria.   Genitourinary:  Negative for dysuria, hematuria, pelvic pain and vaginal bleeding. "   Musculoskeletal:  Positive for arthralgias (L hip). Negative for back pain and myalgias.   Skin:  Negative for color change and rash.   Neurological:  Negative for dizziness, syncope, weakness, light-headedness and headaches.       Historical Information     Immunizations:   Immunization History   Administered Date(s) Administered    COVID-19 MODERNA VACC 0.5 ML IM 2021, 2021, 2021    COVID-19 Moderna Vac BIVALENT 12 Yr+ IM 0.5 ML 2022    COVID-19 Pfizer mRNA vacc PF candace-sucrose 12 yr and older (Comirnaty) 2024    Hep B / HiB 2013, 2013, 10/03/2013    INFLUENZA 2014, 10/18/2022    Influenza Split High Dose Preservative Free IM 10/24/2024    Influenza, high dose seasonal 0.7 mL 2019, 10/02/2020, 10/27/2021, 10/18/2022, 2023    Pneumococcal Conjugate 13-Valent 2020    Pneumococcal Conjugate Vaccine 20-valent (Pcv20), Polysace 2023, 2023    Tdap 2017       Past Medical History:   Diagnosis Date    Anxiety     Closed fracture of coccyx (HCC) 2023    COPD (chronic obstructive pulmonary disease) (HCC)     Diabetes (HCC)     Diabetes mellitus (HCC)     GERD (gastroesophageal reflux disease)     Hyperlipidemia     Hypertension     IBS (irritable bowel syndrome)     Peripheral arterial disease (HCC)     Shoulder fracture     Tobacco use        Family History   Problem Relation Age of Onset    COPD Mother     Emphysema Mother     COPD Father     Emphysema Father      Past Surgical History:   Procedure Laterality Date    ANKLE FRACTURE SURGERY Right     has screw in place     SECTION      x2    CHOLECYSTECTOMY      CYSTOSCOPY W/ LASER LITHOTRIPSY Left 2023    Procedure: CYSTOSCOPY; RETROGRADE; URETEROSCOPY; BIOPSY; LEFT -INSERTION OF STENT;  Surgeon: Cristian Child MD;  Location: [facility];  Service: Urology    CYSTOSCOPY W/ LASER LITHOTRIPSY Left 2023    Procedure: CYSTOSCOPY; RETROGRADE; URETEROSCOPY; LASER  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;  Surgeon: Cristian Child MD;  Location: [facility];  Service: Urology    EVACUATION OF HEMATOMA Right 2/4/2024    Procedure: EVACUATION/ DRAINAGE CHEST WALL  HEMATOMA;  Surgeon: Seth Hoyos MD;  Location: [facility];  Service: Vascular    FL RETROGRADE PYELOGRAM  9/18/2023    HERNIA REPAIR      umbilicus w/ mesh    HI BYPASS W/VEIN AXILLARY-FEMORAL Right 1/31/2024    Procedure: BYPASS AXILLO-FEMORAL, RIGHT WITH 8MM RINGED PTFE GRAFT;  Surgeon: Seth Hoyos MD;  Location: [facility];  Service: Vascular    SPLIT THICKNESS SKIN GRAFT Right 2/28/2024    Procedure: SKIN GRAFT SPLIT THICKNESS (STSG)  EXTREMITY, Wound vac dressing change;  Surgeon: Rigo Yeboah DPM;  Location: [facility];  Service: Podiatry    WOUND DEBRIDEMENT Right 2/4/2024    Procedure: RIGHT WOUND AND ACHILLES DEBRIDEMENT FOOT/TOE (WASH OUT); PARTIAL AMPUTATION DISTAL 2ND TOE;  Surgeon: Aaron Montero DPM;  Location: [facility];  Service: Podiatry    WOUND DEBRIDEMENT Right 2/14/2024    Procedure: DEBRIDEMENT RIGHT GROIN  WOUND AND WASHOUT, APPLICATION OF WOUND VAC;  Surgeon: Suly Muro DO;  [facility]: [facility];  Service: Vascular    WOUND DEBRIDEMENT Right 2/19/2024    Procedure: DEBRIDEMENT LOWER EXTREMITY, washout;  Surgeon: Suly Muro DO;  [facility]: [facility];  Service: Vascular     Social History     Tobacco Use    Smoking status: Every Day     Current packs/day: 1.50     Average packs/day: 1 pack/day for 63.9 years (64.2 ttl pk-yrs)     Types: Cigarettes     Start date: 1961    Smokeless tobacco: Never    Tobacco comments:     Quit January 31, 2024   Vaping Use    Vaping status: Never Used   Substance Use Topics    Alcohol use: Not Currently    Drug use: Never     E-Cigarette/Vaping    E-Cigarette Use Never User      E-Cigarette/Vaping Substances    Nicotine No     THC No     CBD No     Flavoring No     Other No     Unknown No        Family  History: non-contributory    Meds/Allergies   Prior to Admission Medications   Prescriptions Last Dose Informant Patient Reported? Taking?   AMILoride 5 mg tablet   Yes No   Apoaequorin 20 MG CAPS  Self No No   Sig: Take 20 mg by mouth in the morning   Aspirin (ASPIR-81 PO)  Self Yes No   Sig: take one by mouth daily   Blood Glucose Monitoring Suppl (OneTouch Verio) w/Device KIT  Self No No   Sig: Use 2 (two) times a day   Fluticasone-Salmeterol (Advair Diskus) 250-50 mcg/dose inhaler   No No   Sig: Inhale 1 puff 2 (two) times a day Rinse mouth after use.   Januvia 100 MG tablet   No No   Sig: Take 1 tablet (100 mg total) by mouth daily   Lancets (OneTouch Delica Plus Ciarhj64V) MISC  Self No No   Sig: TEST TWO TIMES A DAY   Misc. Devices (Carex Coccyx Cushion) MISC  Self No No   Sig: Use in the morning   Patient taking differently: Use in the morning When uses w/c   OneTouch Verio test strip   Yes No   Xarelto 2.5 MG tablet   No No   Sig: TAKE ONE TABLET BY MOUTH TWICE A DAY   acetaminophen (TYLENOL) 325 mg tablet   Yes No   Sig: Take 975 mg by mouth every 6 (six) hours as needed for mild pain   albuterol (Ventolin HFA) 90 mcg/act inhaler   No No   Sig: Inhale 2 puffs every 6 (six) hours as needed for wheezing   ammonium lactate (LAC-HYDRIN) 12 % lotion   No No   Sig: Apply topically daily To dry scaling skin   atorvastatin (LIPITOR) 10 mg tablet   No No   Sig: TAKE ONE TABLET BY MOUTH EVERY DAY WITH DINNER   buPROPion (WELLBUTRIN XL) 300 mg 24 hr tablet   No No   Sig: TAKE ONE TABLET BY MOUTH EVERY DAY   clonazePAM (KlonoPIN) 1 mg tablet   No No   Sig: TAKE ONE TABLET BY MOUTH IN THE EVENING   cyanocobalamin (VITAMIN B-12) 1000 MCG tablet   No No   Sig: Take 1 tablet (1,000 mcg total) by mouth daily   lisinopril (ZESTRIL) 5 mg tablet   No No   Sig: TAKE ONE TABLET BY MOUTH EVERY DAY   metFORMIN (GLUCOPHAGE) 1000 MG tablet   No No   Sig: TAKE ONE TABLET BY MOUTH TWICE A DAY WITH MEALS   naloxone (NARCAN) 4 mg/0.1  mL nasal spray  Self No No   Sig: Administer 1 spray into a nostril. If no response after 2-3 minutes, give another dose in the other nostril using a new spray.   pantoprazole (PROTONIX) 40 mg tablet   No No   Sig: Take 1 tablet (40 mg total) by mouth daily   polyethylene glycol (MIRALAX) 17 g packet  Self No No   Sig: Take 17 g by mouth daily   pregabalin (LYRICA) 200 MG capsule   No No   Sig: TAKE ONE CAPSULE BY MOUTH THREE TIMES A DAY   solifenacin (VESICARE) 5 mg tablet   No No   Sig: Take 1 tablet (5 mg total) by mouth daily      Facility-Administered Medications: None       Allergies   Allergen Reactions    Paroxetine Other (See Comments)     Became suicidal    Adhesive [Medical Tape] Itching and Blisters    Carafate [Sucralfate] Other (See Comments)     Mouth sores, tongue sore    Sulfa Antibiotics Hives and Rash    Sulfamethoxazole-Trimethoprim Hives       PHYSICAL EXAM    PE limited by:     Objective   Vitals:   First set: Temperature: 98.3 °F (36.8 °C) (12/04/24 1448)  Pulse: 87 (12/04/24 1448)  Respirations: 18 (12/04/24 1448)  Blood Pressure: 100/66 (12/04/24 1448)  SpO2: 92 % (12/04/24 1448)    Primary Survey:   (A) Airway: intact  (B) Breathing: CTA b/l  (C) Circulation: Pulses:   normal  (D) Disabliity:  GCS Total:  15  (E) Expose:  Completed    Secondary Survey: (Click on Physical Exam tab above)  Physical Exam  Vitals and nursing note reviewed.   Constitutional:       General: She is not in acute distress.     Appearance: She is well-developed.   HENT:      Head: Normocephalic and atraumatic.      Right Ear: External ear normal.      Left Ear: External ear normal.      Nose: Nose normal.      Mouth/Throat:      Mouth: Mucous membranes are moist.      Pharynx: No oropharyngeal exudate.   Eyes:      Conjunctiva/sclera: Conjunctivae normal.      Pupils: Pupils are equal, round, and reactive to light.   Cardiovascular:      Rate and Rhythm: Normal rate and regular rhythm.      Heart sounds: Normal heart  sounds. No murmur heard.     No friction rub. No gallop.   Pulmonary:      Effort: Pulmonary effort is normal. No respiratory distress.      Breath sounds: Normal breath sounds. No wheezing or rales.   Abdominal:      General: There is no distension.      Palpations: Abdomen is soft.      Tenderness: There is no abdominal tenderness. There is no guarding.   Musculoskeletal:         General: Tenderness (Left hip) present. No swelling or deformity. Normal range of motion.      Cervical back: Normal range of motion and neck supple.      Comments: Left leg mildly shorter and externally rotated  L Leg neurovascularly intact    Lymphadenopathy:      Cervical: No cervical adenopathy.   Skin:     General: Skin is warm and dry.   Neurological:      General: No focal deficit present.      Mental Status: She is alert and oriented to person, place, and time. Mental status is at baseline.      Cranial Nerves: No cranial nerve deficit.      Sensory: No sensory deficit.      Motor: No weakness or abnormal muscle tone.      Coordination: Coordination normal.         Cervical spine cleared by clinical criteria? Yes     Invasive Devices       Peripheral Intravenous Line  Duration             Peripheral IV 12/04/24 Right Antecubital <1 day                    Lab Results:   Results Reviewed       Procedure Component Value Units Date/Time    APTT [[id number]]  (Normal) Collected: 12/04/24 1457    Lab Status: Final result Specimen: Blood from Arm, Right Updated: 12/04/24 1544     PTT 29 seconds     Protime-INR [[id number]]  (Abnormal) Collected: 12/04/24 1457    Lab Status: Final result Specimen: Blood from Arm, Right Updated: 12/04/24 1544     Protime 15.1 seconds      INR 1.14    Narrative:      INR Therapeutic Range    Indication                                             INR Range      Atrial Fibrillation                                               2.0-3.0  Hypercoagulable State                                    2.0.2.3  Left  Ventricular Asist Device                            2.0-3.0  Mechanical Heart Valve                                  -    Aortic(with afib, MI, embolism, HF, LA enlargement,    and/or coagulopathy)                                     2.0-3.0 (2.5-3.5)     Mitral                                                             2.5-3.5  Prosthetic/Bioprosthetic Heart Valve               2.0-3.0  Venous thromboembolism (VTE: VT, PE        2.0-3.0    Comprehensive metabolic panel [[id number]]  (Abnormal) Collected: 12/04/24 1457    Lab Status: Final result Specimen: Blood from Arm, Right Updated: 12/04/24 1522     Sodium 131 mmol/L      Potassium 5.6 mmol/L      Chloride 98 mmol/L      CO2 27 mmol/L      ANION GAP 6 mmol/L      BUN 18 mg/dL      Creatinine 0.73 mg/dL      Glucose 147 mg/dL      Calcium 10.2 mg/dL      AST 31 U/L      ALT 16 U/L      Alkaline Phosphatase 60 U/L      Total Protein 7.5 g/dL      Albumin 4.4 g/dL      Total Bilirubin 0.65 mg/dL      eGFR 79 ml/min/1.73sq m     Narrative:      National Kid[employer] Foundation guidelines for Chronic Kidney Disease (CKD):     Stage 1 with normal or high GFR (GFR > 90 mL/min/1.73 square meters)    Stage 2 Mild CKD (GFR = 60-89 mL/min/1.73 square meters)    Stage 3A Moderate CKD (GFR = 45-59 mL/min/1.73 square meters)    Stage 3B Moderate CKD (GFR = 30-44 mL/min/1.73 square meters)    Stage 4 Severe CKD (GFR = 15-29 mL/min/1.73 square meters)    Stage 5 End Stage CKD (GFR <15 mL/min/1.73 square meters)  Note: GFR calculation is accurate only with a steady state creatinine    CBC and differential [[id number]]  (Abnormal) Collected: 12/04/24 1457    Lab Status: Final result Specimen: Blood from Arm, Right Updated: 12/04/24 1509     WBC 16.73 Thousand/uL      RBC 4.38 Million/uL      Hemoglobin 13.0 g/dL      Hematocrit 41.1 %      MCV 94 fL      MCH 29.7 pg      MCHC 31.6 g/dL      RDW 14.8 %      MPV 9.7 fL      Platelets 260 Thousands/uL      nRBC 0 /100 WBCs       Segmented % 86 %      Immature Grans % 1 %      Lymphocytes % 5 %      Monocytes % 7 %      Eosinophils Relative 1 %      Basophils Relative 0 %      Absolute Neutrophils 14.64 Thousands/µL      Absolute Immature Grans 0.09 Thousand/uL      Absolute Lymphocytes 0.78 Thousands/µL      Absolute Monocytes 1.11 Thousand/µL      Eosinophils Absolute 0.08 Thousand/µL      Basophils Absolute 0.03 Thousands/µL                    Imaging Studies:   Direct to CT: No  TRAUMA - CT abdomen pelvis w contrast   Final Result by Gibson Montero MD (12/04 3386)      Comminuted nondisplaced intertrochanteric fracture left proximal femur.      Additional findings as above.         Workstation performed: [id number]         TRAUMA - CT head wo contrast   Final Result by Gibson Montero MD (12/04 2846)      No acute intracranial abnormality. Stable findings as above.                  Workstation performed: [id number]         XR Trauma chest portable   Final Result by Gibson Montero MD (12/04 9103)      No acute cardiopulmonary disease. Remote fracture deformity right proximal humerus.            Workstation performed: [id number]         XR Trauma pelvis ap only 1 or 2 vw   Final Result by Gibson Montero MD (12/04 3745)      Intertrochanteric fracture left proximal femur.      (CAA) may have been used to analyze all applicable images.         Workstation performed: [id number]               Procedures  Procedures         ED Course           Medical Decision Making  79-year-old female presented for mechanical fall.  Landed on left hip.  No head strike LOC.  Is on aspirin and Xarelto.  Complaining of pain from left hip down to the left toes.  Not able to bear weight on left leg  Trauma eval was called given Xarelto use.  Obtain CT head.  Will obtain chest x-ray, pelvis x-ray.  Pelvic x-ray shows left proximal hip fracture.  Will obtain CT abdomen pelvis with contrast  Labs within normal limits.  I did speak with orthopedic  surgery about the hip fracture.  TXA was given.  Plan is to admit for surgery on Friday    Problems Addressed:  Closed fracture of left hip, initial encounter (HCC): acute illness or injury    Amount and/or Complexity of Data Reviewed  Labs: ordered.  Radiology: ordered.    Risk  Prescription drug management.  Decision regarding hospitalization.                Disposition  [facility] Transfer: No  Final diagnoses:   Closed fracture of left hip, initial encounter (HCC)     Time reflects when diagnosis was documented in both MDM as applicable and the Disposition within this note       Time User Action Codes Description Comment    12/4/2024  4:16 PM Zafar Conway Add [S72.002A] Closed fracture of left hip, initial encounter (HCC)           ED Disposition       ED Disposition   Admit    Condition   Stable    Date/Time   Wed Dec 4, 2024  4:16 PM    Comment   Case was discussed with EVANGELISTA and the patient's admission status was agreed to be Admission Status: inpatient status to the service of Dr. Thomas .               Follow-up Information    None       Patient's Medications   Discharge Prescriptions    No medications on file     No discharge procedures on file.    PDMP Review         Value Time User    PDMP Reviewed  Yes 3/13/2024  8:35 PM Ashley Depadua, MD            ED Provider  Electronically Signed by           Zafar Conway DO  12/04/24 7408

## 2024-12-04 NOTE — ASSESSMENT & PLAN NOTE
Risk Factor Score (Yes=1, No=0)   Hx of TIA/CVA 0   Hx of prior ischemic heart disease (AMI, unstable angina, Q waves on EKG, CABG) 1   Hx of congestive heart failure 0   Serum Creatinine >2 mg/dl 0   Insulin dependent diabetes mellitus 0   Total Score 1     Risk of MACE (30-day)     Points Risk % (95% CI), Dane, 2017 Risk % (95% CI) Winston, 1999   0 3.9 (2.8-5.4) 0.4 (0.05-1.5)   1 6 (4.9-7.4) 0.9 (0.3-2.1)   2 10.1 (8.1-12.6) 6.6 (3.9-10.3)   3 or more 15 (11.1-20) >11 (5.8-18.4)       Advice    In patients with elevated risk (RCRI >/= 2), assess functional capacity (METs/DASI).   If >4 METs functional capacity, may proceed to surgery. If unknown/poor (<4 METs) functional capacity consider, may need preoperative cardiac testing depending on symptoms and if testing will .      Echocardiogram 8/14/2023-LVEF of 50 to 55%.

## 2024-12-04 NOTE — ASSESSMENT & PLAN NOTE
Noted to have WBC of 16.73, HR 91, and tachypnea   Suspect that SIRS criteria is likely due to recent fall with left hip fracture. No acute sign of infection at this time.   Will monitor off antibiotic  Check UA

## 2024-12-04 NOTE — ASSESSMENT & PLAN NOTE
The patient presented after a fall from standing at home this morning.  She reports that she lost her balance and landed on her left side.  Imaging shows comminuted nondisplaced intertrochanteric fracture left proximal femur   Trauma work-up is negative in ED except left hip fracture.   ED discussed case with orthopedic surgery.  Planning for surgery on 12/6.   Orthopedics input appreciated.  Discussed with orthopedics.  Will keep n.p.o. for potential availability tomorrow.  Will hold aspirin and Xarelto.  Last dose Xarelto 2.5 mg was in the evening of 12/3.  Check CK level as the patient was on the floor for a long period of time.  Start on gentle IV fluids  Pain management  PT/OT evaluation once cleared by orthopedics  DVT prophylaxis-SCDs for now

## 2024-12-04 NOTE — H&P
H&P - Hospitalist   Name: Lona Cedeno 79 y.o. female I MRN: [id number]  Unit/Bed#: -01 I Date of Admission: 12/4/2024   Date of Service: 12/4/2024 I Hospital Day: 0     Assessment & Plan  Closed left hip fracture, initial encounter ([facility])  The patient presented after a fall from standing at home this morning.  She reports that she lost her balance and landed on her left side.  Imaging shows comminuted nondisplaced intertrochanteric fracture left proximal femur   Trauma work-up is negative in ED except left hip fracture.   ED discussed case with orthopedic surgery.  Planning for surgery on 12/6.   Orthopedics input appreciated.  Discussed with orthopedics.  Will keep n.p.o. for potential availability tomorrow.  Will hold aspirin and Xarelto.  Last dose Xarelto 2.5 mg was in the evening of 12/3.  Check CK level as the patient was on the floor for a long period of time.  Start on gentle IV fluids  Pain management  PT/OT evaluation once cleared by orthopedics  DVT prophylaxis-SCDs for now  Type 2 diabetes mellitus with diabetic polyneuropathy, without long-term current use of insulin ([facility])  Lab Results   Component Value Date    HGBA1C 7.4 (H) 10/24/2024       Recent Labs     12/04/24  1746   POCGLU 129       Blood Sugar Average: Last 72 hrs:  (P) 129  Hold metformin and Januvia from home  SSI  Diabetic diet  Continue Lyrica for polyneuropathy.    Essential hypertension  BP is relatively controlled. Some hypotension noted in ED.   Will hold off amiloride and lisinopril for now   Monitor BP closely and re-initiate medications as appropriate  Anxiety and depression  Continue with Wellbutrin, clonazepam and Lyrica  PAD (peripheral artery disease) ([facility])  On aspirin 81 mg daily,Xarelto 2.5 mg twice daily and statin   Will hold ASA and xarelto for now pending orthopedics surgery  Continue statin   COPD, severity to be determined ([facility])  In no acute exacerbation  Continue with Breo and as needed  albuterol  Hyperkalemia  Will hold amiloride for now  Repeat BMP  Low potassium diet  Hyponatremia  Likely hyponatremia hypovolemia given that she was on the floor for a while with decreased p.o. intake  Will give gentle IV fluids   Monitor BMP closely   Acute respiratory insufficiency  Noted SpO2 of 86% on room air in the ED. The patient chronically wears 2 L nasal cannula intermittently with exertion.  Cxr- no acute cardiopulmonary finding  Continue with oxygen supplement  Monitor to keep SpO2 greater than 88%.  Incentive spirometry  Preoperative clearance  Risk Factor Score (Yes=1, No=0)   Hx of TIA/CVA 0   Hx of prior ischemic heart disease (AMI, unstable angina, Q waves on EKG, CABG) 1   Hx of congestive heart failure 0   Serum Creatinine >2 mg/dl 0   Insulin dependent diabetes mellitus 0   Total Score 1     Risk of MACE (30-day)     Points Risk % (95% CI), Dane, 2017 Risk % (95% CI) Winston, 1999   0 3.9 (2.8-5.4) 0.4 (0.05-1.5)   1 6 (4.9-7.4) 0.9 (0.3-2.1)   2 10.1 (8.1-12.6) 6.6 (3.9-10.3)   3 or more 15 (11.1-20) >11 (5.8-18.4)       Advice    In patients with elevated risk (RCRI >/= 2), assess functional capacity (METs/DASI).   If >4 METs functional capacity, may proceed to surgery. If unknown/poor (<4 METs) functional capacity consider, may need preoperative cardiac testing depending on symptoms and if testing will .      Echocardiogram 8/14/2023-LVEF of 50 to 55%.  SIRS (systemic inflammatory response syndrome) (HCC)  Noted to have WBC of 16.73, HR 91, and tachypnea   Suspect that SIRS criteria is likely due to recent fall with left hip fracture. No acute sign of infection at this time.   Will monitor off antibiotic  Check UA      VTE Pharmacologic Prophylaxis: VTE Score: 9 High Risk (Score >/= 5) - Pharmacological DVT Prophylaxis Contraindicated. Sequential Compression Devices Ordered.  Code Status: Level 1 - Full Codediscussed with patient   Discussion with family: Updated contact  person ( and son) at bedside.    Anticipated Length of Stay: Patient will be admitted on an inpatient basis with an anticipated length of stay of greater than 2 midnights secondary to left hip fracture .    History of Present Illness   Chief Complaint: Left hip pain    Lona Cedeno is a 79 y.o. female with a PMH of diabetes, PAD, hypertension, and COPD on intermittent O2 supplement who presents with left hip pain.  The patient presented after a fall from standing at home this morning.  She reports that she lost her balance and landed on her left side. She denies any loss of consciousness, chest pain, dizziness, or lightheadedness. A trauma alert was called in the ED. Imaging shows left hip fracture. She is subsequently being admitted. Orthopedics surgery is planning surgery on 12/6.     Review of Systems   Constitutional:  Negative for activity change, appetite change, chills, diaphoresis and fever.   HENT:  Negative for congestion, ear pain, hearing loss, tinnitus and trouble swallowing.    Eyes:  Negative for photophobia, pain, discharge, itching and visual disturbance.   Respiratory:  Negative for cough, shortness of breath, wheezing and stridor.    Cardiovascular:  Negative for chest pain, palpitations and leg swelling.   Gastrointestinal:  Negative for abdominal pain, blood in stool, constipation, diarrhea, nausea and vomiting.   Endocrine: Negative for cold intolerance, heat intolerance, polydipsia, polyphagia and polyuria.   Genitourinary:  Negative for difficulty urinating, dysuria, frequency, hematuria and urgency.   Musculoskeletal:  Positive for gait problem. Negative for back pain and neck stiffness.        Left hip pain    Skin:  Negative for pallor, rash and wound.   Allergic/Immunologic: Negative for environmental allergies, food allergies and immunocompromised state.   Neurological:  Negative for dizziness, tremors, speech difficulty, weakness, light-headedness, numbness and headaches.    Hematological:  Negative for adenopathy. Does not bruise/bleed easily.   Psychiatric/Behavioral:  Negative for confusion, hallucinations and sleep disturbance.        Historical Information   Past Medical History:   Diagnosis Date    Anxiety     Closed fracture of coccyx (HCC) 2023    COPD (chronic obstructive pulmonary disease) (HCC)     Diabetes (HCC)     Diabetes mellitus (HCC)     GERD (gastroesophageal reflux disease)     Hyperlipidemia     Hypertension     IBS (irritable bowel syndrome)     Peripheral arterial disease (HCC)     Shoulder fracture     Tobacco use      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;  Surgeon: Cristian Child MD;  Location: [facility];  Service: Urology    EVACUATION OF HEMATOMA Right 2024    Procedure: EVACUATION/ DRAINAGE CHEST WALL  HEMATOMA;  Surgeon: Seth Hoyos MD;  Location: [facility];  Service: Vascular    FL RETROGRADE PYELOGRAM  2023    HERNIA REPAIR      umbilicus w/ mesh    SD BYPASS W/VEIN AXILLARY-FEMORAL Right 2024    Procedure: BYPASS AXILLO-FEMORAL, RIGHT WITH 8MM RINGED PTFE GRAFT;  Surgeon: Seth Hoyos MD;  Location: [facility];  Service: Vascular    SPLIT THICKNESS SKIN GRAFT Right 2024    Procedure: SKIN GRAFT SPLIT THICKNESS (STSG)  EXTREMITY, Wound vac dressing change;  Surgeon: Rigo Yeboah DPM;  Location: [facility];  Service: Podiatry    WOUND DEBRIDEMENT Right 2024    Procedure: RIGHT WOUND AND ACHILLES DEBRIDEMENT  FOOT/TOE (WASH OUT); PARTIAL AMPUTATION DISTAL 2ND TOE;  Surgeon: Aaron Montero DPM;  Location: [facility];  Service: Podiatry    WOUND DEBRIDEMENT Right 2/14/2024    Procedure: DEBRIDEMENT RIGHT GROIN  WOUND AND WASHOUT, APPLICATION OF WOUND VAC;  Surgeon: uSly Muro DO;  Location: [facility];  Service: Vascular    WOUND DEBRIDEMENT Right 2/19/2024    Procedure: DEBRIDEMENT LOWER EXTREMITY, washout;  Surgeon: Suly Muro DO;  Location: [facility];  Service: Vascular     Social History     Tobacco Use    Smoking status: Every Day     Current packs/day: 1.50     Average packs/day: 1 pack/day for 63.9 years (64.2 ttl pk-yrs)     Types: Cigarettes     Start date: 1961    Smokeless tobacco: Never    Tobacco comments:     Quit January 31, 2024   Vaping Use    Vaping status: Never Used   Substance and Sexual Activity    Alcohol use: Not Currently    Drug use: Never    Sexual activity: Not Currently     E-Cigarette/Vaping    E-Cigarette Use Never User      E-Cigarette/Vaping Substances    Nicotine No     THC No     CBD No     Flavoring No     Other No     Unknown No      Family History   Problem Relation Age of Onset    COPD Mother     Emphysema Mother     COPD Father     Emphysema Father      Social History:  Marital Status: /Civil Union   Occupation: n/a   Patient Pre-hospital Living Situation: Home  Patient Pre-hospital Level of Mobility: walks with walker  Patient Pre-hospital Diet Restrictions: DM    Meds/Allergies   I have reviewed home medications with patient personally.  Prior to Admission medications    Medication Sig Start Date End Date Taking? Authorizing Provider   acetaminophen (TYLENOL) 325 mg tablet Take 975 mg by mouth every 6 (six) hours as needed for mild pain    Historical Provider, MD   albuterol (Ventolin HFA) 90 mcg/act inhaler Inhale 2 puffs every 6 (six) hours as needed for wheezing 6/24/24   Vivek Preston DO   AMILoride 5 mg tablet  6/4/24   Historical Provider, MD   ammonium  lactate (LAC-HYDRIN) 12 % lotion Apply topically daily To dry scaling skin 6/18/24 11/7/24  Lindsey Owen PA-C   Apoaequorin 20 MG CAPS Take 20 mg by mouth in the morning 4/18/24   Vivek Preston DO   Aspirin (ASPIR-81 PO) take one by mouth daily 1/14/14   Historical Provider, MD   atorvastatin (LIPITOR) 10 mg tablet TAKE ONE TABLET BY MOUTH EVERY DAY WITH DINNER 11/2/24   Vivek Preston DO   Blood Glucose Monitoring Suppl (OneTouch Verio) w/Device KIT Use 2 (two) times a day 4/18/24   Vivek Preston DO   buPROPion (WELLBUTRIN XL) 300 mg 24 hr tablet TAKE ONE TABLET BY MOUTH EVERY DAY 9/17/24   Vivek Preston DO   clonazePAM (KlonoPIN) 1 mg tablet TAKE ONE TABLET BY MOUTH IN THE EVENING 10/1/24   Vivek Preston DO   cyanocobalamin (VITAMIN B-12) 1000 MCG tablet Take 1 tablet (1,000 mcg total) by mouth daily 6/24/24   Vivek Preston DO   Fluticasone-Salmeterol (Advair Diskus) 250-50 mcg/dose inhaler Inhale 1 puff 2 (two) times a day Rinse mouth after use. 6/24/24 11/7/24  Vivek Preston DO   Januvia 100 MG tablet Take 1 tablet (100 mg total) by mouth daily 11/14/24   Vivek Preston DO   Lancets (OneTouch Delica Plus Ageyaq75R) MISC TEST TWO TIMES A DAY 4/18/24   Vivek Preston DO   lisinopril (ZESTRIL) 5 mg tablet TAKE ONE TABLET BY MOUTH EVERY DAY 8/23/24   Vivek Preston DO   metFORMIN (GLUCOPHAGE) 1000 MG tablet TAKE ONE TABLET BY MOUTH TWICE A DAY WITH MEALS 10/21/24   Vivek Preston DO   Misc. Devices (Carex Coccyx Cushion) MISC Use in the morning  Patient taking differently: Use in the morning When uses w/c 5/24/23   Myrna Aguilar DNP   naloxone (NARCAN) 4 mg/0.1 mL nasal spray Administer 1 spray into a nostril. If no response after 2-3 minutes, give another dose in the other nostril using a new spray. 4/18/24 4/18/25  Vivek Preston, DO   OneTouch Verio test strip  4/24/24   Historical Provider, MD   pantoprazole (PROTONIX) 40 mg tablet Take 1 tablet (40 mg total) by  mouth daily 6/24/24   Vivek Preston DO   polyethylene glycol (MIRALAX) 17 g packet Take 17 g by mouth daily 2/24/24   Clara Gann DO   pregabalin (LYRICA) 200 MG capsule TAKE ONE CAPSULE BY MOUTH THREE TIMES A DAY 11/20/24   Vivek Preston DO   solifenacin (VESICARE) 5 mg tablet Take 1 tablet (5 mg total) by mouth daily 6/24/24   Vivek Preston DO   Xarelto 2.5 MG tablet TAKE ONE TABLET BY MOUTH TWICE A DAY 10/14/24   Vivek Preston DO     Allergies   Allergen Reactions    Paroxetine Other (See Comments)     Became suicidal    Adhesive [Medical Tape] Itching and Blisters    Carafate [Sucralfate] Other (See Comments)     Mouth sores, tongue sore    Sulfa Antibiotics Hives and Rash    Sulfamethoxazole-Trimethoprim Hives       Objective :  Temp:  [98.1 °F (36.7 °C)-98.3 °F (36.8 °C)] 98.1 °F (36.7 °C)  HR:  [86-91] 86  BP: ()/(53-70) 113/67  Resp:  [16-46] 16  SpO2:  [70 %-94 %] 94 %  O2 Device: Nasal cannula    Physical Exam  Vitals and nursing note reviewed.   Constitutional:       General: She is not in acute distress.     Appearance: Normal appearance.   HENT:      Head: Normocephalic and atraumatic.      Right Ear: External ear normal.      Left Ear: External ear normal.      Nose: Nose normal. No rhinorrhea.      Mouth/Throat:      Mouth: Mucous membranes are moist.      Pharynx: Oropharynx is clear.   Eyes:      General:         Right eye: No discharge.         Left eye: No discharge.      Pupils: Pupils are equal, round, and reactive to light.   Cardiovascular:      Rate and Rhythm: Normal rate and regular rhythm.      Pulses: Normal pulses.      Heart sounds: Normal heart sounds. No murmur heard.  Pulmonary:      Effort: Pulmonary effort is normal. No respiratory distress.      Breath sounds: Normal breath sounds.   Abdominal:      General: Bowel sounds are normal. There is no distension.      Palpations: Abdomen is soft. There is no mass.      Tenderness: There is no abdominal  tenderness.   Musculoskeletal:         General: No swelling or tenderness.      Cervical back: Normal range of motion and neck supple. No muscular tenderness.      Left hip: Deformity and bony tenderness present. Decreased range of motion.   Skin:     General: Skin is warm and dry.      Capillary Refill: Capillary refill takes less than 2 seconds.      Findings: No erythema or rash.   Neurological:      General: No focal deficit present.      Mental Status: She is alert and oriented to person, place, and time. Mental status is at baseline.   Psychiatric:         Mood and Affect: Mood normal.         Behavior: Behavior normal.         Thought Content: Thought content normal.         Judgment: Judgment normal.        Lines/Drains:            Lab Results: I have reviewed the following results:  Results from last 7 days   Lab Units 12/04/24  1457   WBC Thousand/uL 16.73*   HEMOGLOBIN g/dL 13.0   HEMATOCRIT % 41.1   PLATELETS Thousands/uL 260   SEGS PCT % 86*   LYMPHO PCT % 5*   MONO PCT % 7   EOS PCT % 1     Results from last 7 days   Lab Units 12/04/24  1457   SODIUM mmol/L 131*   POTASSIUM mmol/L 5.6*   CHLORIDE mmol/L 98   CO2 mmol/L 27   BUN mg/dL 18   CREATININE mg/dL 0.73   ANION GAP mmol/L 6   CALCIUM mg/dL 10.2   ALBUMIN g/dL 4.4   TOTAL BILIRUBIN mg/dL 0.65   ALK PHOS U/L 60   ALT U/L 16   AST U/L 31   GLUCOSE RANDOM mg/dL 147*     Results from last 7 days   Lab Units 12/04/24  1457   INR  1.14     Results from last 7 days   Lab Units 12/04/24  1746   POC GLUCOSE mg/dl 129     Lab Results   Component Value Date    HGBA1C 7.4 (H) 10/24/2024    HGBA1C 7.6 (H) 06/08/2024    HGBA1C 6.0 (H) 02/28/2024           Imaging Results Review: I reviewed radiology reports from this admission including: chest xray, CT abdomen/pelvis, CT head, and xray(s).  Other Study Results Review: EKG was reviewed.     Administrative Statements   I have spent a total time of 50 minutes in caring for this patient on the day of the  visit/encounter including Diagnostic results, Prognosis, Risks and benefits of tx options, Instructions for management, Patient and family education, Importance of tx compliance, Risk factor reductions, Impressions, Counseling / Coordination of care, Documenting in the medical record, Reviewing / ordering tests, medicine, procedures  , Obtaining or reviewing history  , and Communicating with other healthcare professionals .    ** Please Note: This note has been constructed using a voice recognition system. **

## 2024-12-04 NOTE — ASSESSMENT & PLAN NOTE
Likely hyponatremia hypovolemia given that she was on the floor for a while with decreased p.o. intake  Will give gentle IV fluids   Monitor BMP closely

## 2024-12-05 ENCOUNTER — APPOINTMENT (INPATIENT)
Dept: RADIOLOGY | Facility: HOSPITAL | Age: 79
DRG: 481 | End: 2024-12-05
Payer: COMMERCIAL

## 2024-12-05 PROBLEM — R33.9 URINARY RETENTION: Status: ACTIVE | Noted: 2024-12-05

## 2024-12-05 LAB
ANION GAP SERPL CALCULATED.3IONS-SCNC: 4 MMOL/L (ref 4–13)
ATRIAL RATE: 85 BPM
BUN SERPL-MCNC: 14 MG/DL (ref 5–25)
CALCIUM SERPL-MCNC: 9.2 MG/DL (ref 8.4–10.2)
CHLORIDE SERPL-SCNC: 106 MMOL/L (ref 96–108)
CK SERPL-CCNC: 118 U/L (ref 26–192)
CO2 SERPL-SCNC: 25 MMOL/L (ref 21–32)
CREAT SERPL-MCNC: 0.53 MG/DL (ref 0.6–1.3)
ERYTHROCYTE [DISTWIDTH] IN BLOOD BY AUTOMATED COUNT: 14.9 % (ref 11.6–15.1)
GFR SERPL CREATININE-BSD FRML MDRD: 91 ML/MIN/1.73SQ M
GLUCOSE SERPL-MCNC: 111 MG/DL (ref 65–140)
GLUCOSE SERPL-MCNC: 125 MG/DL (ref 65–140)
GLUCOSE SERPL-MCNC: 131 MG/DL (ref 65–140)
GLUCOSE SERPL-MCNC: 132 MG/DL (ref 65–140)
GLUCOSE SERPL-MCNC: 156 MG/DL (ref 65–140)
HCT VFR BLD AUTO: 37.4 % (ref 34.8–46.1)
HGB BLD-MCNC: 11.8 G/DL (ref 11.5–15.4)
MAGNESIUM SERPL-MCNC: 1.5 MG/DL (ref 1.9–2.7)
MCH RBC QN AUTO: 30.1 PG (ref 26.8–34.3)
MCHC RBC AUTO-ENTMCNC: 31.6 G/DL (ref 31.4–37.4)
MCV RBC AUTO: 95 FL (ref 82–98)
P AXIS: 80 DEGREES
PLATELET # BLD AUTO: 201 THOUSANDS/UL (ref 149–390)
PMV BLD AUTO: 9.7 FL (ref 8.9–12.7)
POTASSIUM SERPL-SCNC: 4.7 MMOL/L (ref 3.5–5.3)
PR INTERVAL: 186 MS
QRS AXIS: -56 DEGREES
QRSD INTERVAL: 114 MS
QT INTERVAL: 352 MS
QTC INTERVAL: 418 MS
RBC # BLD AUTO: 3.92 MILLION/UL (ref 3.81–5.12)
SODIUM SERPL-SCNC: 135 MMOL/L (ref 135–147)
T WAVE AXIS: 72 DEGREES
VENTRICULAR RATE: 85 BPM
WBC # BLD AUTO: 10.74 THOUSAND/UL (ref 4.31–10.16)

## 2024-12-05 PROCEDURE — 85027 COMPLETE CBC AUTOMATED: CPT | Performed by: NURSE PRACTITIONER

## 2024-12-05 PROCEDURE — 71045 X-RAY EXAM CHEST 1 VIEW: CPT

## 2024-12-05 PROCEDURE — 82948 REAGENT STRIP/BLOOD GLUCOSE: CPT

## 2024-12-05 PROCEDURE — 99232 SBSQ HOSP IP/OBS MODERATE 35: CPT | Performed by: INTERNAL MEDICINE

## 2024-12-05 PROCEDURE — 82550 ASSAY OF CK (CPK): CPT | Performed by: NURSE PRACTITIONER

## 2024-12-05 PROCEDURE — 99223 1ST HOSP IP/OBS HIGH 75: CPT | Performed by: STUDENT IN AN ORGANIZED HEALTH CARE EDUCATION/TRAINING PROGRAM

## 2024-12-05 PROCEDURE — 93010 ELECTROCARDIOGRAM REPORT: CPT | Performed by: INTERNAL MEDICINE

## 2024-12-05 PROCEDURE — 80048 BASIC METABOLIC PNL TOTAL CA: CPT | Performed by: NURSE PRACTITIONER

## 2024-12-05 PROCEDURE — 83735 ASSAY OF MAGNESIUM: CPT | Performed by: NURSE PRACTITIONER

## 2024-12-05 RX ORDER — MAGNESIUM SULFATE HEPTAHYDRATE 40 MG/ML
4 INJECTION, SOLUTION INTRAVENOUS ONCE
Status: COMPLETED | OUTPATIENT
Start: 2024-12-05 | End: 2024-12-05

## 2024-12-05 RX ORDER — CHLORHEXIDINE GLUCONATE 40 MG/ML
SOLUTION TOPICAL DAILY PRN
Status: DISCONTINUED | OUTPATIENT
Start: 2024-12-05 | End: 2024-12-06 | Stop reason: HOSPADM

## 2024-12-05 RX ADMIN — OXYCODONE HYDROCHLORIDE 10 MG: 10 TABLET ORAL at 00:50

## 2024-12-05 RX ADMIN — ACETAMINOPHEN 975 MG: 325 TABLET ORAL at 15:46

## 2024-12-05 RX ADMIN — OXYCODONE HYDROCHLORIDE 5 MG: 5 TABLET ORAL at 09:18

## 2024-12-05 RX ADMIN — ACETAMINOPHEN 975 MG: 325 TABLET ORAL at 22:01

## 2024-12-05 RX ADMIN — PANTOPRAZOLE SODIUM 40 MG: 40 TABLET, DELAYED RELEASE ORAL at 08:55

## 2024-12-05 RX ADMIN — PREGABALIN 200 MG: 100 CAPSULE ORAL at 08:56

## 2024-12-05 RX ADMIN — ATORVASTATIN CALCIUM 10 MG: 10 TABLET, FILM COATED ORAL at 16:44

## 2024-12-05 RX ADMIN — OXYCODONE HYDROCHLORIDE 10 MG: 10 TABLET ORAL at 20:59

## 2024-12-05 RX ADMIN — MORPHINE SULFATE 2 MG: 2 INJECTION, SOLUTION INTRAMUSCULAR; INTRAVENOUS at 13:00

## 2024-12-05 RX ADMIN — Medication: at 08:56

## 2024-12-05 RX ADMIN — DOCUSATE SODIUM 100 MG: 100 CAPSULE, LIQUID FILLED ORAL at 18:32

## 2024-12-05 RX ADMIN — INSULIN LISPRO 1 UNITS: 100 INJECTION, SOLUTION INTRAVENOUS; SUBCUTANEOUS at 16:43

## 2024-12-05 RX ADMIN — PREGABALIN 200 MG: 100 CAPSULE ORAL at 21:00

## 2024-12-05 RX ADMIN — FLUTICASONE FUROATE AND VILANTEROL TRIFENATATE 1 PUFF: 100; 25 POWDER RESPIRATORY (INHALATION) at 08:57

## 2024-12-05 RX ADMIN — ACETAMINOPHEN 975 MG: 325 TABLET ORAL at 05:46

## 2024-12-05 RX ADMIN — CLONAZEPAM 1 MG: 1 TABLET ORAL at 18:32

## 2024-12-05 RX ADMIN — OXYBUTYNIN CHLORIDE 5 MG: 5 TABLET, EXTENDED RELEASE ORAL at 08:55

## 2024-12-05 RX ADMIN — SODIUM CHLORIDE 75 ML/HR: 0.9 INJECTION, SOLUTION INTRAVENOUS at 06:28

## 2024-12-05 RX ADMIN — MAGNESIUM SULFATE HEPTAHYDRATE 4 G: 40 INJECTION, SOLUTION INTRAVENOUS at 08:55

## 2024-12-05 RX ADMIN — PREGABALIN 200 MG: 100 CAPSULE ORAL at 16:44

## 2024-12-05 RX ADMIN — DOCUSATE SODIUM 100 MG: 100 CAPSULE, LIQUID FILLED ORAL at 08:56

## 2024-12-05 RX ADMIN — BUPROPION HYDROCHLORIDE 300 MG: 150 TABLET, EXTENDED RELEASE ORAL at 08:55

## 2024-12-05 RX ADMIN — CYANOCOBALAMIN TAB 500 MCG 1000 MCG: 500 TAB at 08:56

## 2024-12-05 RX ADMIN — NICOTINE 1 PATCH: 21 PATCH, EXTENDED RELEASE TRANSDERMAL at 08:55

## 2024-12-05 NOTE — ASSESSMENT & PLAN NOTE
Noted to have WBC of 16.73, HR 91, and tachypnea   Suspect that SIRS criteria is likely due to recent fall with left hip fracture. No acute sign of infection at this time.   UA- negative   Will monitor off antibiotic  SIRS parameters are improving

## 2024-12-05 NOTE — UTILIZATION REVIEW
" Initial Clinical Review    Admission: Date/Time/Statement:   Admission Orders (From admission, onward)       Ordered        12/04/24 1616  INPATIENT ADMISSION  Once                          Orders Placed This Encounter   Procedures    INPATIENT ADMISSION     Standing Status:   Standing     Number of Occurrences:   1     Level of Care:   Med Surg [16]     Estimated length of stay:   More than 2 Midnights     Certification:   I certify that inpatient services are medically necessary for this patient for a duration of greater than two midnights. See H&P and MD Progress Notes for additional information about the patient's course of treatment.     ED Arrival Information       Expected   -    Arrival   12/4/2024 14:39    Acuity   Urgent              Means of arrival   Walk-In    Escorted by   Family Member    Service   Hospitalist    Admission type   Emergency              Arrival complaint   fall, hip pain, 2 blood thinners             Chief Complaint   Patient presents with    Fall     According to the patient she had lost her balance and fell and laid on the floor for \"awhile\" this morning.  Patient reports pain in the left hip.       Initial Presentation: 79 y.o. female to the ED from home with complaints of fall. Admitted to inpatient for closed left hip fracture.  H/O  diabetes, PAD, hypertension, and COPD on intermittent O2 supplement . Fell after losing balance, landed on left hip. ON arrival, left hip deformity present with tenderness, limited ROM.WBCs 16.73.Xray shows:  comminuted nondisplaced intertrochanteric fracture left proximal femur .  Hold asa, xarelto.  In the Ed given txa, iv dilaudid.  Start on gently iv fluids.  SCDs. Likely hyponatremia hypovolemia given that she was on the floor for a while with decreased p.o. intake . Pulse ox on room air 86 %.  Placed 2 LNC on .     Anticipated Length of Stay/Certification Statement: Patient will be admitted on an inpatient basis with an anticipated length of " stay of greater than 2 midnights secondary to left hip fracture .     Date: 12/5   Day 2: COntinue to hold xarelto, asa.  CK normalized with IV fluids.  Monitor off abx for now. Continues to have left hip pain worse with movement.     ORtho consult: left displaced intertrochanteric hip fracture medically optimize then plan for surgery.      Date: 12/6  Day 3: Has surpassed a 2nd midnight with active treatments and services. Initially admitted to inpatient for hip fracture.  Pending surgical fixation today. CK elevated on arrival, normalized with IV fluids.         ED Treatment-Medication Administration from 12/04/2024 1439 to 12/04/2024 1725         Date/Time Order Dose Route Action     12/04/2024 1535 tranexamic acid (CYKLOKAPRON) 1000-0.7 MG/100ML-% injection 1,000 mg 1,000 mg Intravenous New Bag     12/04/2024 1549 tranexamic Acid 1,000 mg in sodium chloride 0.9 % 500 mL IVPB 1,000 mg Intravenous New Bag     12/04/2024 1519 HYDROmorphone HCl (DILAUDID) injection 0.2 mg 0.2 mg Intravenous Given     12/04/2024 1539 iohexol (OMNIPAQUE) 350 MG/ML injection (MULTI-DOSE) 100 mL 100 mL Intravenous Given            Scheduled Medications:  acetaminophen, 975 mg, Oral, Q8H JILLIAN  ammonium lactate, , Topical, Daily  [Held by provider] aspirin, 81 mg, Oral, Daily  atorvastatin, 10 mg, Oral, Daily With Dinner  buPROPion, 300 mg, Oral, Daily  clonazePAM, 1 mg, Oral, QPM  cyanocobalamin, 1,000 mcg, Oral, Daily  docusate sodium, 100 mg, Oral, BID  Fluticasone Furoate-Vilanterol, 1 puff, Inhalation, Daily  insulin lispro, 1-6 Units, Subcutaneous, TID AC  insulin lispro, 1-6 Units, Subcutaneous, HS  magnesium sulfate, 4 g, Intravenous, Once  nicotine, 1 patch, Transdermal, Daily  oxybutynin, 5 mg, Oral, Daily  pantoprazole, 40 mg, Oral, Daily  polyethylene glycol, 17 g, Oral, Daily  pregabalin, 200 mg, Oral, TID      Continuous IV Infusions:       PRN Meds:  albuterol, 2 puff, Inhalation, Q6H PRN  chlorhexidine gluconate, , Topical,  Daily PRN  morphine injection, 2 mg, Intravenous, Q4H PRN  ondansetron, 4 mg, Intravenous, Q6H PRN  oxyCODONE, 10 mg, Oral, Q4H PRN  oxyCODONE, 5 mg, Oral, Q4H PRN      ED Triage Vitals [12/04/24 1448]   Temperature Pulse Respirations Blood Pressure SpO2 Pain Score   98.3 °F (36.8 °C) 87 18 100/66 92 % 10 - Worst Possible Pain     Weight (last 2 days)       Date/Time Weight    12/06/24 1333 76.2 (168)    12/04/24 1725 76.4 (168.43)    12/04/24 1448 79.4 (175)            Vital Signs (last 3 days)       Date/Time Temp Pulse Resp BP MAP (mmHg) SpO2 Calculated FIO2 (%) - Nasal Cannula Nasal Cannula O2 Flow Rate (L/min) O2 Device Patient Position - Orthostatic VS Linda Coma Scale Score Pain    12/06/24 1333 97.7 °F (36.5 °C) 87 18 162/66 -- 96 % 32 3 L/min Nasal cannula -- -- 8    12/06/24 13:07:52 98.2 °F (36.8 °C) 90 16 151/61 91 87 % -- -- -- -- -- --    12/06/24 0839 -- -- -- -- -- -- -- -- -- -- -- 7    12/06/24 0823 -- -- -- -- -- 90 % 32 3 L/min Nasal cannula -- 15 7    12/06/24 07:55:41 98.1 °F (36.7 °C) 84 18 131/56 81 90 % -- -- -- -- -- --    12/06/24 0630 -- -- -- -- -- 91 % 32 3 L/min Nasal cannula -- -- --    12/06/24 0519 -- -- -- -- -- -- -- -- -- -- -- 5    12/06/24 0408 -- -- -- -- -- 93 % 32 3 L/min Nasal cannula -- -- --    12/06/24 0300 -- -- -- -- -- 89 % 32 3 L/min Nasal cannula -- -- --    12/06/24 0100 -- -- -- -- -- 91 % 32 3 L/min Nasal cannula -- -- --    12/05/24 23:25:29 97.9 °F (36.6 °C) 84 18 129/50 76 91 % 32 3 L/min Nasal cannula -- -- --    12/05/24 2300 -- -- -- -- -- 93 % -- -- -- -- -- --    12/05/24 2201 -- -- -- -- -- -- -- -- -- -- -- 4    12/05/24 2100 -- -- -- -- -- 90 % -- -- -- -- -- --    12/05/24 2059 -- -- -- -- -- -- -- -- -- -- -- 8    12/05/24 2020 -- -- -- -- -- -- -- -- -- -- 15 No Pain    12/05/24 1810 -- -- -- -- -- 91 % 32 3 L/min Nasal cannula -- -- --    12/05/24 16:09:20 -- -- -- 116/84 95 78 % -- -- -- -- -- --    12/05/24 1546 -- -- -- -- -- -- -- -- -- -- --  No Pain    12/05/24 14:43:40 98.5 °F (36.9 °C) 90 16 88/47 61 93 % -- -- -- -- -- --    12/05/24 1300 -- -- -- -- -- -- -- -- -- -- -- 8    12/05/24 0918 -- -- -- -- -- -- -- -- -- -- -- 7    12/05/24 0815 -- -- -- -- -- -- 36 4 L/min Nasal cannula -- 15 --    12/05/24 07:45:36 98.1 °F (36.7 °C) 83 16 101/41 61 91 % -- -- -- -- -- --    12/05/24 0546 -- -- -- -- -- -- -- -- -- -- -- 5    12/05/24 0050 -- -- -- -- -- -- -- -- -- -- -- 7    12/04/24 2257 -- -- -- -- -- -- -- -- -- -- -- No Pain    12/04/24 22:07:54 98.4 °F (36.9 °C) 88 18 113/42 66 93 % -- -- -- -- -- --    12/04/24 2030 -- -- -- -- -- 97 % 36 4 L/min Nasal cannula -- 15 No Pain    12/04/24 1817 -- -- -- -- -- -- -- -- -- -- -- 4    12/04/24 1725 98.1 °F (36.7 °C) 86 16 113/67 82 94 % 36 4 L/min Nasal cannula Lying 15 4    12/04/24 1615 -- 88 18 134/60 -- 94 % -- -- -- -- 15 --    12/04/24 1610 -- 90 21 -- -- 90 % -- -- -- -- -- --    12/04/24 1605 -- 91 16 -- -- 92 % -- -- -- -- -- --    12/04/24 1600 -- 90 25 -- -- 94 % -- -- -- -- -- --    12/04/24 1555 -- 89 30 -- -- 91 % -- -- -- -- -- --    12/04/24 1550 -- 91 23 -- -- 70 % -- -- -- -- -- --    12/04/24 1545 -- 90 20 137/63 -- 82 % -- -- -- -- 15 --    12/04/24 15:40:03 -- -- -- 137/63 -- -- -- -- -- -- -- --    12/04/24 1540 -- 88 25 -- -- 85 % -- -- -- -- -- --    12/04/24 1535 -- 88 23 -- -- 93 % -- -- -- -- -- --    12/04/24 1530 -- -- 18 128/64 -- 94 % -- -- Nasal cannula -- 15 --    12/04/24 1519 -- -- -- -- -- -- -- -- -- -- -- 8    12/04/24 1515 -- 91 20 136/70 -- 90 % -- -- Nasal cannula -- 15 --    12/04/24 1510 -- 86 46 -- -- 86 % -- -- None (Room air) -- -- --    12/04/24 1505 -- 88 42 -- -- 79 % -- -- -- -- -- --    12/04/24 1500 -- 89 20 -- -- 91 % -- -- -- -- 15 --    12/04/24 1455 -- 89 -- -- -- 89 % -- -- -- -- -- --    12/04/24 14:53:51 -- -- -- -- -- -- -- -- -- -- 15 10 - Worst Possible Pain    12/04/24 14:52:02 -- -- -- 97/53 -- -- -- -- -- -- -- --    12/04/24 1448 98.3 °F  (36.8 °C) 87 18 100/66 77 92 % -- -- None (Room air) Lying -- 10 - Worst Possible Pain              Pertinent Labs/Diagnostic Test Results:   Radiology:  XR hip/pelv 2-3 vws left if performed   Final Interpretation by Eliana King MD (12/06 1029)      Similar appearance of left proximal femoral intertrochanteric fracture.         Computerized Assisted Algorithm (CAA) may have been used to analyze all applicable images.            Workstation performed: [id number]         XR chest portable   Final Interpretation by Bib Lee MD (12/06 0708)      No acute cardiopulmonary disease.            Workstation performed: [id number]         TRAUMA - CT abdomen pelvis w contrast   Final Interpretation by Gibson Montero MD (12/04 1556)      Comminuted nondisplaced intertrochanteric fracture left proximal femur.      Additional findings as above.         Workstation performed: [id number]         TRAUMA - CT head wo contrast   Final Interpretation by Gibson Montero MD (12/04 8529)      No acute intracranial abnormality. Stable findings as above.                  Workstation performed: [id number]         XR Trauma chest portable   Final Interpretation by Gibson Montero MD (12/04 8547)      No acute cardiopulmonary disease. Remote fracture deformity right proximal humerus.            Workstation performed: [id number]         XR Trauma pelvis ap only 1 or 2 vw   Final Interpretation by Gibson Montero MD (12/04 7648)      Intertrochanteric fracture left proximal femur.      (CAA) may have been used to analyze all applicable images.         Workstation performed: [id number]         FL < 1 hour    (Results Pending)     Cardiology:  ECG 12 lead   Final Result by Alexander Cespedes MD (12/05 1231)   Normal sinus rhythm   Left axis deviation   When compared with ECG of 23-Jan-2024 20:22,   No significant change was found      Confirmed by Alexander Cespedes ([id number]) on 12/5/2024 12:31:02 PM            Results  from last 7 days   Lab Units 12/06/24  0508 12/05/24  0531 12/04/24  1457   WBC Thousand/uL 9.46 10.74* 16.73*   HEMOGLOBIN g/dL 13.1 11.8 13.0   HEMATOCRIT % 41.7 37.4 41.1   PLATELETS Thousands/uL 254 201 260   TOTAL NEUT ABS Thousands/µL  --   --  14.64*         Results from last 7 days   Lab Units 12/06/24  0508 12/05/24  0531 12/04/24  1748 12/04/24  1457   SODIUM mmol/L 136 135 133* 131*   POTASSIUM mmol/L 4.5 4.7 5.3 5.6*   CHLORIDE mmol/L 102 106 100 98   CO2 mmol/L 30 25 29 27   ANION GAP mmol/L 4 4 4 6   BUN mg/dL 9 14 16 18   CREATININE mg/dL 0.55* 0.53* 0.60 0.73   EGFR ml/min/1.73sq m 90 91 87 79   CALCIUM mg/dL 9.5 9.2 9.7 10.2   MAGNESIUM mg/dL 1.7* 1.5*  --   --      Results from last 7 days   Lab Units 12/04/24  1457   AST U/L 31   ALT U/L 16   ALK PHOS U/L 60   TOTAL PROTEIN g/dL 7.5   ALBUMIN g/dL 4.4   TOTAL BILIRUBIN mg/dL 0.65     Results from last 7 days   Lab Units 12/06/24  1317 12/06/24  1305 12/06/24  1050 12/06/24  0643 12/05/24  2110 12/05/24  1629 12/05/24  1141 12/05/24  0736 12/04/24  2113 12/04/24  1746   POC GLUCOSE mg/dl 123 125 139 142* 131 156* 111 125 203* 129     Results from last 7 days   Lab Units 12/06/24  0508 12/05/24  0531 12/04/24  1748 12/04/24  1457   GLUCOSE RANDOM mg/dL 133 132 142* 147*         Results from last 7 days   Lab Units 12/05/24  0531 12/04/24  1748   CK TOTAL U/L 118 223*             Results from last 7 days   Lab Units 12/04/24  1457   PROTIME seconds 15.1*   INR  1.14   PTT seconds 29         Results from last 7 days   Lab Units 12/04/24  1905   CLARITY UA  Clear   COLOR UA  Yellow   SPEC GRAV UA  1.020   PH UA  6.0   GLUCOSE UA mg/dl Negative   KETONES UA mg/dl Negative   BLOOD UA  Negative   PROTEIN UA mg/dl Negative   NITRITE UA  Negative   BILIRUBIN UA  Negative   UROBILINOGEN UA E.U./dl 0.2   LEUKOCYTES UA  Negative           Past Medical History:   Diagnosis Date    Anxiety     Closed fracture of coccyx (HCC) 05/24/2023    COPD (chronic  obstructive pulmonary disease) (HCC)     Diabetes (HCC)     Diabetes mellitus (HCC)     GERD (gastroesophageal reflux disease)     Hyperlipidemia     Hypertension     IBS (irritable bowel syndrome)     Peripheral arterial disease (HCC)     Shoulder fracture     Tobacco use      Present on Admission:   Type 2 diabetes mellitus with diabetic polyneuropathy, without long-term current use of insulin (HCC)   PAD (peripheral artery disease) (HCC)   Essential hypertension   COPD, severity to be determined (HCC)   Anxiety and depression   Hyperkalemia   Hyponatremia      Admitting Diagnosis: Closed fracture of left hip, initial encounter ([facility]) [S72.002A]  Closed left hip fracture, initial encounter ([facility]) [S72.002A]  Age/Sex: 79 y.o. female    Network Utilization Review Department  ATTENTION: Please call with any questions or concerns to [phone] and carefully listen to the prompts so that you are directed to the right person. All voicemails are confidential.   For Discharge needs, contact Care Management DC Support Team at [phone] opt. [phone]  Send all requests for admission clinical reviews, approved or denied determinations and any other requests to dedicated fax number below belonging to the [place] where the patient is receiving treatment. List of dedicated fax numbers for the Facilities:  FACILITY NAME UR FAX NUMBER   ADMISSION DENIALS (Administrative/Medical Necessity) [phone]   DISCHARGE SUPPORT TEAM (NETWORK) [phone]   PARENT CHILD HEALTH (Maternity/NICU/Pediatrics) [phone]   [facility] [phone]   [facility] [phone]   [facility] [phone]   [facility] [phone]   [facility] [phone]   [facility] [phone]   [facility] [phone]   [facility] [phone]    [facility] [phone]   [facility] [phone]   [facility] [phone]   [facility] [phone]

## 2024-12-05 NOTE — PLAN OF CARE
Problem: Prexisting or High Potential for Compromised Skin Integrity  Goal: Skin integrity is maintained or improved  Description: INTERVENTIONS:  - Identify patients at risk for skin breakdown  - Assess and monitor skin integrity  - Assess and monitor nutrition and hydration status  - Monitor labs   - Assess for incontinence   - Turn and reposition patient  - Assist with mobility/ambulation  - Relieve pressure over bony prominences  - Avoid friction and shearing  - Provide appropriate hygiene as needed including keeping skin clean and dry  - Evaluate need for skin moisturizer/barrier cream  - Collaborate with interdisciplinary team   - Patient/family teaching  - Consider wound care consult   Outcome: Progressing     Problem: PAIN - ADULT  Goal: Verbalizes/displays adequate comfort level or baseline comfort level  Description: Interventions:  - Encourage patient to monitor pain and request assistance  - Assess pain using appropriate pain scale  - Administer analgesics based on type and severity of pain and evaluate response  - Implement non-pharmacological measures as appropriate and evaluate response  - Consider cultural and social influences on pain and pain management  - Notify physician/advanced practitioner if interventions unsuccessful or patient reports new pain  Outcome: Progressing     Problem: INFECTION - ADULT  Goal: Absence or prevention of progression during hospitalization  Description: INTERVENTIONS:  - Assess and monitor for signs and symptoms of infection  - Monitor lab/diagnostic results  - Monitor all insertion sites, i.e. indwelling lines, tubes, and drains  - Monitor endotracheal if appropriate and nasal secretions for changes in amount and color  - [place] appropriate cooling/warming therapies per order  - Administer medications as ordered  - Instruct and encourage patient and family to use good hand hygiene technique  - Identify and instruct in appropriate isolation precautions for  identified infection/condition  Outcome: Progressing

## 2024-12-05 NOTE — OCCUPATIONAL THERAPY NOTE
Occupational Therapy Cancellation Note     12/05/24 1402   OT Last Visit   OT Visit Date 12/05/24   Note Type   Note type Cancelled Session   Cancel Reasons Patient to operating room     OT orders received. Chart reviewed. Will continue to follow-up as able and appropriate    Shaneka Stark OTR/L

## 2024-12-05 NOTE — ASSESSMENT & PLAN NOTE
The patient presented after a fall from standing at home this morning.  She reports that she lost her balance and landed on her left side.  Imaging shows comminuted nondisplaced intertrochanteric fracture left proximal femur   Trauma work-up is negative in ED except left hip fracture.   CK normalized with IV fluids   Orthopedics input appreciated. Plan for surgical intervention on 12/6   Will hold aspirin and Xarelto.  Last dose Xarelto 2.5 mg was in the evening of 12/3.  Pain management  PT/OT evaluation once cleared by orthopedics  DVT prophylaxis-SCDs for now

## 2024-12-05 NOTE — ASSESSMENT & PLAN NOTE
Noted SpO2 of 86% on room air in the ED.   The patient chronically wears 2 L nasal cannula intermittently at home   Cxr (12/4)- no acute cardiopulmonary finding  Noted increase O2 requirement to 4L NC  Will repeat cxr; possibly related to atelectasis  Continue with oxygen supplement  Monitor to keep SpO2 greater than 88%.  Incentive spirometry, cough and deep breathing

## 2024-12-05 NOTE — CONSULTS
Consultation - Orthopedics   Name: Lona Cedeno 79 y.o. female I MRN: [id number]  Unit/Bed#: -01 I Date of Admission: 12/4/2024   Date of Service: 12/5/2024 I Hospital Day: 1   Inpatient consult to Orthopedic Surgery  Consult performed by: Masood Alcantar PA-C  Consult ordered by: ROXANE Carlin        Physician Requesting Evaluation: Charley Thomas, *   Reason for Evaluation / Principal Problem: Left hip fracture    Assessment & Plan  Closed left hip fracture, initial encounter (Formerly [facility])    Type 2 diabetes mellitus with diabetic polyneuropathy, without long-term current use of insulin (Formerly [facility] [place])  Lab Results   Component Value Date    HGBA1C 7.4 (H) 10/24/2024       Recent Labs     12/04/24  1746 12/04/24  2113 12/05/24  0736 12/05/24  1141   POCGLU 129 203* 125 111       Blood Sugar Average: Last 72 hrs:  (P) 142    Essential hypertension    Anxiety and depression    PAD (peripheral artery disease) (Formerly [facility] [place])    COPD, severity to be determined (Formerly [facility] [place])    Hyperkalemia    Hyponatremia    Acute respiratory insufficiency    Preoperative clearance    SIRS (systemic inflammatory response syndrome) (Formerly [facility])    Orthopedics service will follow.    X-rays of the patient's left hip are consistent with an intertrochanteric fracture.  This type of fracture will require surgical intervention.  The risks and benefits of surgery were discussed with the patient.  The plan is for a TFN of her left hip tomorrow 12/6/2024.  The patient should be n.p.o. after midnight and Xarelto should continue to be held.  She is acceptable to this plan.    History of Present Illness   HPI: Lona Cedeno is a 79 y.o. year old female who presented to the emergency department after suffering a fall.  The patient states she lost her balance and fell along her left side.  She had immediate pain in her hip and groin.  She was taken to the emergency department where x-rays were performed which were consistent with an intertrochanteric fracture.   At that point, orthopedic surgery was consulted.  The patient still complains of pain along her hip.  She states that occasionally she requires home O2.  She also smokes cigarettes and is on Xarelto for peripheral artery disease.  She denies any numbness or tingling.  She denies any fever or chills.    Review of Systems   Constitutional:  Negative for chills, fever and unexpected weight change.   HENT:  Negative for nosebleeds and sore throat.    Eyes:  Negative for pain, redness and visual disturbance.   Respiratory:  Negative for cough, shortness of breath and wheezing.    Cardiovascular:  Negative for chest pain, palpitations and leg swelling.   Gastrointestinal:  Negative for abdominal pain, nausea and vomiting.   Endocrine: Negative for polydipsia and polyuria.   Genitourinary:  Negative for dysuria and hematuria.   Musculoskeletal:  Positive for arthralgias, gait problem and myalgias.        As noted in HPI   Skin:  Negative for rash and wound.   Neurological:  Negative for dizziness, numbness and headaches.   Psychiatric/Behavioral:  Negative for decreased concentration and suicidal ideas. The patient is not nervous/anxious.     significant for findings described in the HPI.  I have reviewed the patient's PMH, PSH, Social History, Family History, Meds, and Allergies  Historical Information   Past Medical History:   Diagnosis Date    Anxiety     Closed fracture of coccyx (HCC) 2023    COPD (chronic obstructive pulmonary disease) (HCC)     Diabetes (HCC)     Diabetes mellitus (HCC)     GERD (gastroesophageal reflux disease)     Hyperlipidemia     Hypertension     IBS (irritable bowel syndrome)     Peripheral arterial disease (HCC)     Shoulder fracture     Tobacco use      Past Surgical History:   Procedure Laterality Date    ANKLE FRACTURE SURGERY Right     has screw in place     SECTION      x2    CHOLECYSTECTOMY      CYSTOSCOPY W/ LASER LITHOTRIPSY Left 2023    Procedure: CYSTOSCOPY;  RETROGRADE; URETEROSCOPY; BIOPSY; LEFT -INSERTION OF STENT;  Surgeon: Cristian Child MD;  Location: [facility];  Service: Urology    CYSTOSCOPY W/ LASER LITHOTRIPSY Left 11/20/2023    Procedure: CYSTOSCOPY; RETROGRADE; URETEROSCOPY; 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;  Surgeon: Cristian Child MD;  Location: [facility];  Service: Urology    EVACUATION OF HEMATOMA Right 2/4/2024    Procedure: EVACUATION/ DRAINAGE CHEST WALL  HEMATOMA;  Surgeon: Seth Hoyos MD;  Location: [facility];  Service: Vascular    FL RETROGRADE PYELOGRAM  9/18/2023    HERNIA REPAIR      umbilicus w/ mesh    WY BYPASS W/VEIN AXILLARY-FEMORAL Right 1/31/2024    Procedure: BYPASS AXILLO-FEMORAL, RIGHT WITH 8MM RINGED PTFE GRAFT;  Surgeon: Seth Hoyos MD;  Location: [facility];  Service: Vascular    SPLIT THICKNESS SKIN GRAFT Right 2/28/2024    Procedure: SKIN GRAFT SPLIT THICKNESS (STSG)  EXTREMITY, Wound vac dressing change;  Surgeon: Rigo Yeboah DPM;  Location: [facility];  Service: Podiatry    WOUND DEBRIDEMENT Right 2/4/2024    Procedure: RIGHT WOUND AND ACHILLES DEBRIDEMENT FOOT/TOE (WASH OUT); PARTIAL AMPUTATION DISTAL 2ND TOE;  Surgeon: Aaron Montero DPM;  Location: [facility];  Service: Podiatry    WOUND DEBRIDEMENT Right 2/14/2024    Procedure: DEBRIDEMENT RIGHT GROIN  WOUND AND WASHOUT, APPLICATION OF WOUND VAC;  Surgeon: Suly Muro DO;  Location: [facility];  Service: Vascular    WOUND DEBRIDEMENT Right 2/19/2024    Procedure: DEBRIDEMENT LOWER EXTREMITY, washout;  Surgeon: Suly Muro DO;  Location: [facility];  Service: Vascular     Social History     Tobacco Use    Smoking status: Every Day     Current packs/day: 1.50     Average packs/day: 1 pack/day for 63.9 years (64.2 ttl pk-yrs)     Types: Cigarettes     Start date: 1961    Smokeless tobacco: Never    Tobacco comments:     Quit January 31, 2024   Vaping Use    Vaping status: Never Used   Substance and  Sexual Activity    Alcohol use: Not Currently    Drug use: Never    Sexual activity: Not Currently     E-Cigarette/Vaping    E-Cigarette Use Never User      E-Cigarette/Vaping Substances    Nicotine No     THC No     CBD No     Flavoring No     Other No     Unknown No      Family History   Problem Relation Age of Onset    COPD Mother     Emphysema Mother     COPD Father     Emphysema Father      Social History     Tobacco Use    Smoking status: Every Day     Current packs/day: 1.50     Average packs/day: 1 pack/day for 63.9 years (64.2 ttl pk-yrs)     Types: Cigarettes     Start date: 1961    Smokeless tobacco: Never    Tobacco comments:     Quit January 31, 2024   Vaping Use    Vaping status: Never Used   Substance and Sexual Activity    Alcohol use: Not Currently    Drug use: Never    Sexual activity: Not Currently       Current Facility-Administered Medications:     acetaminophen (TYLENOL) tablet 975 mg, Q8H JILLIAN    albuterol (PROVENTIL HFA,VENTOLIN HFA) inhaler 2 puff, Q6H PRN    ammonium lactate (LAC-HYDRIN) 12 % lotion, Daily    [Held by provider] aspirin (ECOTRIN LOW STRENGTH) EC tablet 81 mg, Daily    atorvastatin (LIPITOR) tablet 10 mg, Daily With Dinner    buPROPion (WELLBUTRIN XL) 24 hr tablet 300 mg, Daily    clonazePAM (KlonoPIN) tablet 1 mg, QPM    cyanocobalamin (VITAMIN B-12) tablet 1,000 mcg, Daily    docusate sodium (COLACE) capsule 100 mg, BID    Fluticasone Furoate-Vilanterol 100-25 mcg/actuation 1 puff, Daily    insulin lispro (HumALOG/ADMELOG) 100 units/mL subcutaneous injection 1-6 Units, TID AC **AND** Fingerstick Glucose (POCT), TID AC    insulin lispro (HumALOG/ADMELOG) 100 units/mL subcutaneous injection 1-6 Units, HS    magnesium sulfate 4 g/100 mL IVPB (premix) 4 g, Once, Last Rate: 4 g (12/05/24 0855)    morphine injection 2 mg, Q4H PRN    nicotine (NICODERM CQ) 21 mg/24 hr TD 24 hr patch 1 patch, Daily    ondansetron (ZOFRAN) injection 4 mg, Q6H PRN    oxybutynin (DITROPAN-XL) 24 hr  tablet 5 mg, Daily    oxyCODONE (ROXICODONE) immediate release tablet 10 mg, Q4H PRN    oxyCODONE (ROXICODONE) IR tablet 5 mg, Q4H PRN    pantoprazole (PROTONIX) EC tablet 40 mg, Daily    polyethylene glycol (MIRALAX) packet 17 g, Daily    pregabalin (LYRICA) capsule 200 mg, TID    sodium chloride 0.9 % infusion, Continuous, Last Rate: 75 mL/hr (12/05/24 0628)  Prior to Admission Medications   Prescriptions Last Dose Informant Patient Reported? Taking?   AMILoride 5 mg tablet   Yes No   Apoaequorin 20 MG CAPS Not Taking Self No No   Sig: Take 20 mg by mouth in the morning   Patient not taking: Reported on 12/4/2024   Aspirin (ASPIR-81 PO) 12/3/2024 Morning Self Yes Yes   Sig: take one by mouth daily   Blood Glucose Monitoring Suppl (OneTouch Verio) w/Device KIT Unknown Self No No   Sig: Use 2 (two) times a day   Fluticasone-Salmeterol (Advair Diskus) 250-50 mcg/dose inhaler 12/3/2024 Morning  No Yes   Sig: Inhale 1 puff 2 (two) times a day Rinse mouth after use.   Januvia 100 MG tablet 12/3/2024 Morning  No Yes   Sig: Take 1 tablet (100 mg total) by mouth daily   Lancets (OneTouch Delica Plus [id number]) MISC Unknown Self No No   Sig: TEST TWO TIMES A DAY   Misc. Devices (Carex Coccyx Cushion) MISC Unknown Self No No   Sig: Use in the morning   Patient taking differently: Use in the morning When uses w/c   OneTouch Verio test strip Unknown  Yes No   Xarelto 2.5 MG tablet 12/3/2024 Morning  No Yes   Sig: TAKE ONE TABLET BY MOUTH TWICE A DAY   acetaminophen (TYLENOL) 325 mg tablet 12/4/2024  Yes Yes   Sig: Take 975 mg by mouth every 6 (six) hours as needed for mild pain   albuterol (Ventolin HFA) 90 mcg/act inhaler 12/3/2024  No Yes   Sig: Inhale 2 puffs every 6 (six) hours as needed for wheezing   ammonium lactate (LAC-HYDRIN) 12 % lotion 12/4/2024  No Yes   Sig: Apply topically daily To dry scaling skin   atorvastatin (LIPITOR) 10 mg tablet 12/3/2024 Evening  No Yes   Sig: TAKE ONE TABLET BY MOUTH EVERY DAY WITH  DINNER   buPROPion (WELLBUTRIN XL) 300 mg 24 hr tablet 12/3/2024 Morning  No Yes   Sig: TAKE ONE TABLET BY MOUTH EVERY DAY   clonazePAM (KlonoPIN) 1 mg tablet 12/3/2024 Morning  No Yes   Sig: TAKE ONE TABLET BY MOUTH IN THE EVENING   cyanocobalamin (VITAMIN B-12) 1000 MCG tablet 12/3/2024 Morning  No Yes   Sig: Take 1 tablet (1,000 mcg total) by mouth daily   lisinopril (ZESTRIL) 5 mg tablet   No No   Sig: TAKE ONE TABLET BY MOUTH EVERY DAY   metFORMIN (GLUCOPHAGE) 1000 MG tablet 12/3/2024 Evening  No Yes   Sig: TAKE ONE TABLET BY MOUTH TWICE A DAY WITH MEALS   naloxone (NARCAN) 4 mg/0.1 mL nasal spray Not Taking Self No No   Sig: Administer 1 spray into a nostril. If no response after 2-3 minutes, give another dose in the other nostril using a new spray.   Patient not taking: Reported on 12/4/2024   pantoprazole (PROTONIX) 40 mg tablet 12/3/2024 Morning  No Yes   Sig: Take 1 tablet (40 mg total) by mouth daily   polyethylene glycol (MIRALAX) 17 g packet Not Taking Self No No   Sig: Take 17 g by mouth daily   Patient not taking: Reported on 12/4/2024   pregabalin (LYRICA) 200 MG capsule 12/4/2024 Morning  No Yes   Sig: TAKE ONE CAPSULE BY MOUTH THREE TIMES A DAY   solifenacin (VESICARE) 5 mg tablet 12/3/2024 Morning  No Yes   Sig: Take 1 tablet (5 mg total) by mouth daily      Facility-Administered Medications: None     Paroxetine, Adhesive [medical tape], Carafate [sucralfate], Sulfa antibiotics, and Sulfamethoxazole-trimethoprim    Objective :  Temp:  [98.1 °F (36.7 °C)-98.4 °F (36.9 °C)] 98.1 °F (36.7 °C)  HR:  [83-91] 83  BP: ()/(41-70) 101/41  Resp:  [16-46] 16  SpO2:  [70 %-97 %] 91 %  O2 Device: Nasal cannula  Nasal Cannula O2 Flow Rate (L/min):  [4 L/min] 4 L/min  Physical ExamOrtho Exam   Left lower extremity is neurovascularly intact  Toes are pink and mobile  Compartments are soft  Leg is shortened and externally rotated  There is tenderness to palpation along hip and groin  Pain with  logrolling  Sensation intact  Physical Exam   Constitutional: Appears well-developed and well-nourished. No distress.   HENT:   Head: Normocephalic.   Eyes: Conjunctivae are normal. Right eye exhibits no discharge. Left eye exhibits no discharge. No scleral icterus.   Cardiovascular: Normal rate.    Pulmonary/Chest: Effort normal.   Neurological: Alert and oriented to person, place, and time.   Skin: Skin is warm and dry. No rash noted. The patient is not diaphoretic. No erythema. No pallor.   Psychiatric: Normal mood and affect. Behavior is normal. Judgment and thought content normal.      Lab Results: I have reviewed the following results:   Recent Labs     12/04/24  1457 12/04/24  1748 12/05/24  0531   WBC 16.73*  --  10.74*   HGB 13.0  --  11.8   HCT 41.1  --  37.4     --  201   BUN 18 16 14   CREATININE 0.73 0.60 0.53*   PTT 29  --   --    INR 1.14  --   --      Blood Culture:   Lab Results   Component Value Date    BLOODCX No Growth After 5 Days. 10/23/2024     Wound Culture:   Lab Results   Component Value Date    WOUNDCULT 1+ Growth of Klebsiella pneumoniae (A) 02/14/2024    WOUNDCULT 1+ Growth of Proteus vulgaris group (A) 02/14/2024    WOUNDCULT Few Colonies of 02/14/2024       Imaging Results Review: I personally reviewed the following image studies in PACS and associated radiology reports: X-ray left hip. My interpretation of the radiology images/reports is: Intertrochanteric fracture of left hip.  Other Study Results Review: No additional pertinent studies reviewed.

## 2024-12-05 NOTE — ASSESSMENT & PLAN NOTE
BP is relatively controlled. Some hypotension noted possible related to pain medications.   Will hold off amiloride and lisinopril for now   Monitor BP closely and re-initiate medications as appropriate

## 2024-12-05 NOTE — ASSESSMENT & PLAN NOTE
Likely due to acute left hip fracture and nonambulatory status  UA- negative   Will hold oxybutynin for now  Continue with urinary retention protocol

## 2024-12-05 NOTE — ASSESSMENT & PLAN NOTE
Lab Results   Component Value Date    HGBA1C 7.4 (H) 10/24/2024       Recent Labs     12/04/24  1746 12/04/24  2113 12/05/24  0736 12/05/24  1141   POCGLU 129 203* 125 111       Blood Sugar Average: Last 72 hrs:  (P) 142

## 2024-12-05 NOTE — ASSESSMENT & PLAN NOTE
Lab Results   Component Value Date    HGBA1C 7.4 (H) 10/24/2024       Recent Labs     12/04/24  1746 12/04/24  2113 12/05/24  0736 12/05/24  1141   POCGLU 129 203* 125 111       Blood Sugar Average: Last 72 hrs:  (P) 142  Hold metformin and Januvia from home  SSI  Diabetic diet  Continue Lyrica for polyneuropathy.

## 2024-12-05 NOTE — ASSESSMENT & PLAN NOTE
Likely hyponatremia hypovolemia given that she was on the floor for a while with decreased p.o. intake  Received gentle IV fluids   Monitor BMP closely

## 2024-12-05 NOTE — PLAN OF CARE
Problem: Potential for Falls  Goal: Patient will remain free of falls  Description: INTERVENTIONS:  - Educate patient/family on patient safety including physical limitations  - Instruct patient to call for assistance with activity   - Consult OT/PT to assist with strengthening/mobility   - Keep Call bell within reach  - Keep bed low and locked with side rails adjusted as appropriate  - Keep care items and personal belongings within reach  - Initiate and maintain comfort rounds  - Make Fall Risk Sign visible to staff  - Offer Toileting every 2 Hours, in advance of need  - Initiate/Maintain bed alarm  - Obtain necessary fall risk management equipment:   - Apply yellow socks and bracelet for high fall risk patients  - Consider moving patient to room near nurses station  Outcome: Progressing     Problem: Prexisting or High Potential for Compromised Skin Integrity  Goal: Skin integrity is maintained or improved  Description: INTERVENTIONS:  - Identify patients at risk for skin breakdown  - Assess and monitor skin integrity  - Assess and monitor nutrition and hydration status  - Monitor labs   - Assess for incontinence   - Turn and reposition patient  - Assist with mobility/ambulation  - Relieve pressure over bony prominences  - Avoid friction and shearing  - Provide appropriate hygiene as needed including keeping skin clean and dry  - Evaluate need for skin moisturizer/barrier cream  - Collaborate with interdisciplinary team   - Patient/family teaching  - Consider wound care consult   Outcome: Progressing     Problem: PAIN - ADULT  Goal: Verbalizes/displays adequate comfort level or baseline comfort level  Description: Interventions:  - Encourage patient to monitor pain and request assistance  - Assess pain using appropriate pain scale  - Administer analgesics based on type and severity of pain and evaluate response  - Implement non-pharmacological measures as appropriate and evaluate response  - Consider cultural and  social influences on pain and pain management  - Notify physician/advanced practitioner if interventions unsuccessful or patient reports new pain  Outcome: Progressing     Problem: INFECTION - ADULT  Goal: Absence or prevention of progression during hospitalization  Description: INTERVENTIONS:  - Assess and monitor for signs and symptoms of infection  - Monitor lab/diagnostic results  - Monitor all insertion sites, i.e. indwelling lines, tubes, and drains  - Monitor endotracheal if appropriate and nasal secretions for changes in amount and color  - [place] appropriate cooling/warming therapies per order  - Administer medications as ordered  - Instruct and encourage patient and family to use good hand hygiene technique  - Identify and instruct in appropriate isolation precautions for identified infection/condition  Outcome: Progressing     Problem: SAFETY ADULT  Goal: Patient will remain free of falls  Description: INTERVENTIONS:  - Educate patient/family on patient safety including physical limitations  - Instruct patient to call for assistance with activity   - Consult OT/PT to assist with strengthening/mobility   - Keep Call bell within reach  - Keep bed low and locked with side rails adjusted as appropriate  - Keep care items and personal belongings within reach  - Initiate and maintain comfort rounds  - Make Fall Risk Sign visible to staff  - Offer Toileting every 2 Hours, in advance of need  - Initiate/Maintain bed alarm  - Obtain necessary fall risk management equipment:   - Apply yellow socks and bracelet for high fall risk patients  - Consider moving patient to room near nurses station  Outcome: Progressing  Goal: Maintain or return to baseline ADL function  Description: INTERVENTIONS:  -  Assess patient's ability to carry out ADLs; assess patient's baseline for ADL function and identify physical deficits which impact ability to perform ADLs (bathing, care of mouth/teeth, toileting, grooming, dressing,  etc.)  - Assess/evaluate cause of self-care deficits   - Assess range of motion  - Assess patient's mobility; develop plan if impaired  - Assess patient's need for assistive devices and provide as appropriate  - Encourage maximum independence but intervene and supervise when necessary  - Involve family in performance of ADLs  - Assess for home care needs following discharge   - Consider OT consult to assist with ADL evaluation and planning for discharge  - Provide patient education as appropriate  Outcome: Progressing  Goal: Maintains/Returns to pre admission functional level  Description: INTERVENTIONS:  - Perform AM-PAC 6 Click Basic Mobility/ Daily Activity assessment daily.  - Set and communicate daily mobility goal to care team and patient/family/caregiver.   - Collaborate with rehabilitation services on mobility goals if consulted  - Perform Range of Motion 2 times a day.  - Reposition patient every 2 hours.  - Dangle patient 2 times a day  - Stand patient 2 times a day  - Ambulate patient 2 times a day  - Out of bed to chair 2 times a day   - Out of bed for meals 2 times a day  - Out of bed for toileting  - Record patient progress and toleration of activity level   Outcome: Progressing     Problem: DISCHARGE PLANNING  Goal: Discharge to home or other facility with appropriate resources  Description: INTERVENTIONS:  - Identify barriers to discharge w/patient and caregiver  - Arrange for needed discharge resources and transportation as appropriate  - Identify discharge learning needs (meds, wound care, etc.)  - Arrange for interpretive services to assist at discharge as needed  - Refer to Case Management Department for coordinating discharge planning if the patient needs post-hospital services based on physician/advanced practitioner order or complex needs related to functional status, cognitive ability, or social support system  Outcome: Progressing     Problem: Knowledge Deficit  Goal:  Patient/family/caregiver demonstrates understanding of disease process, treatment plan, medications, and discharge instructions  Description: Complete learning assessment and assess knowledge base.  Interventions:  - Provide teaching at level of understanding  - Provide teaching via preferred learning methods  Outcome: Progressing

## 2024-12-05 NOTE — PROGRESS NOTES
Progress Note - Hospitalist   Name: Lona Cedeno 79 y.o. female I MRN: [id number]  Unit/Bed#: -01 I Date of Admission: 12/4/2024   Date of Service: 12/5/2024 I Hospital Day: 1    Assessment & Plan  Closed left hip fracture, initial encounter ([facility])  The patient presented after a fall from standing at home this morning.  She reports that she lost her balance and landed on her left side.  Imaging shows comminuted nondisplaced intertrochanteric fracture left proximal femur   Trauma work-up is negative in ED except left hip fracture.   CK normalized with IV fluids   Orthopedics input appreciated. Plan for surgical intervention on 12/6   Will hold aspirin and Xarelto.  Last dose Xarelto 2.5 mg was in the evening of 12/3.  Pain management  PT/OT evaluation once cleared by orthopedics  DVT prophylaxis-SCDs for now  Type 2 diabetes mellitus with diabetic polyneuropathy, without long-term current use of insulin ([facility])  Lab Results   Component Value Date    HGBA1C 7.4 (H) 10/24/2024       Recent Labs     12/04/24  1746 12/04/24  2113 12/05/24  0736 12/05/24  1141   POCGLU 129 203* 125 111       Blood Sugar Average: Last 72 hrs:  (P) 142  Hold metformin and Januvia from home  SSI  Diabetic diet  Continue Lyrica for polyneuropathy.     Essential hypertension  BP is relatively controlled. Some hypotension noted possible related to pain medications.   Will hold off amiloride and lisinopril for now   Monitor BP closely and re-initiate medications as appropriate  Anxiety and depression  Continue with Wellbutrin, clonazepam and Lyrica   PAD (peripheral artery disease) ([facility])  On aspirin 81 mg daily,Xarelto 2.5 mg twice daily and statin   Will hold ASA and xarelto for now pending orthopedics surgery  Continue statin    COPD, severity to be determined ([facility])  In no acute exacerbation  Continue with Breo and as needed albuterol   Hyperkalemia  Will hold amiloride for now  Low potassium diet  Monitor BMP   Hyponatremia  Likely  hyponatremia hypovolemia given that she was on the floor for a while with decreased p.o. intake  Received gentle IV fluids   Monitor BMP closely   Acute respiratory insufficiency  Noted SpO2 of 86% on room air in the ED.   The patient chronically wears 2 L nasal cannula intermittently at home   Cxr (12/4)- no acute cardiopulmonary finding  Noted increase O2 requirement to 4L NC  Will repeat cxr; possibly related to atelectasis  Continue with oxygen supplement  Monitor to keep SpO2 greater than 88%.  Incentive spirometry, cough and deep breathing   Preoperative clearance  Risk Factor Score (Yes=1, No=0)   Hx of TIA/CVA 0   Hx of prior ischemic heart disease (AMI, unstable angina, Q waves on EKG, CABG) 1   Hx of congestive heart failure 0   Serum Creatinine >2 mg/dl 0   Insulin dependent diabetes mellitus 0   Total Score 1     Risk of MACE (30-day)     Points Risk % (95% CI), Dane, 2017 Risk % (95% CI) Winston, 1999   0 3.9 (2.8-5.4) 0.4 (0.05-1.5)   1 6 (4.9-7.4) 0.9 (0.3-2.1)   2 10.1 (8.1-12.6) 6.6 (3.9-10.3)   3 or more 15 (11.1-20) >11 (5.8-18.4)       Advice    In patients with elevated risk (RCRI >/= 2), assess functional capacity (METs/DASI).   If >4 METs functional capacity, may proceed to surgery. If unknown/poor (<4 METs) functional capacity consider, may need preoperative cardiac testing depending on symptoms and if testing will .      Echocardiogram 8/14/2023-LVEF of 50 to 55%.    SIRS (systemic inflammatory response syndrome) (HCC)  Noted to have WBC of 16.73, HR 91, and tachypnea   Suspect that SIRS criteria is likely due to recent fall with left hip fracture. No acute sign of infection at this time.   UA- negative   Will monitor off antibiotic  SIRS parameters are improving   Urinary retention  Likely due to acute left hip fracture and nonambulatory status  UA- negative   Will hold oxybutynin for now  Continue with urinary retention protocol    VTE Pharmacologic Prophylaxis: VTE Score:  9 High Risk (Score >/= 5) - Pharmacological DVT Prophylaxis Contraindicated. Sequential Compression Devices Ordered.    Mobility:   Basic Mobility Inpatient Raw Score: 8  JH-HLM Goal: 3: Sit at edge of bed  JH-HLM Achieved: 2: Bed activities/Dependent transfer  JH-HLM Goal NOT achieved. Continue with multidisciplinary rounding and encourage appropriate mobility to improve upon JH-HLM goals.    Patient Centered Rounds: I performed bedside rounds with nursing staff today.   Discussions with Specialists or Other Care Team Provider:  orthopedics, CM, OT/PT     Education and Discussions with Family / Patient: Updated  () via phone.    Current Length of Stay: 1 day(s)  Current Patient Status: Inpatient   Certification Statement: The patient will continue to require additional inpatient hospital stay due to left hip fracture   Discharge Plan: Anticipate discharge in 48 hrs to discharge location to be determined pending rehab evaluations.    Code Status: Level 3 - DNAR and DNI    Subjective   The patient was seen and examined.  She stated she is feeling okay.  Continues to have left hip pain worse with movement.  The patient was straight cathed this a.m. for urinary retention.  No acute event overnight.    Objective :  Temp:  [98.1 °F (36.7 °C)-98.5 °F (36.9 °C)] 98.5 °F (36.9 °C)  HR:  [83-90] 90  BP: ()/(41-67) 88/47  Resp:  [16-21] 16  SpO2:  [90 %-97 %] 93 %  O2 Device: Nasal cannula  Nasal Cannula O2 Flow Rate (L/min):  [4 L/min] 4 L/min    Body mass index is 31.82 kg/m².     Input and Output Summary (last 24 hours):     Intake/Output Summary (Last 24 hours) at 12/5/2024 1608  Last data filed at 12/5/2024 1200  Gross per 24 hour   Intake 2324.25 ml   Output 1625 ml   Net 699.25 ml       Physical Exam  Vitals and nursing note reviewed.   Constitutional:       General: She is not in acute distress.     Appearance: Normal appearance.   HENT:      Head: Normocephalic and atraumatic.      Right  Ear: External ear normal.      Left Ear: External ear normal.      Nose: Nose normal. No rhinorrhea.      Mouth/Throat:      Mouth: Mucous membranes are moist.      Pharynx: Oropharynx is clear.   Eyes:      General:         Right eye: No discharge.         Left eye: No discharge.      Pupils: Pupils are equal, round, and reactive to light.   Cardiovascular:      Rate and Rhythm: Normal rate and regular rhythm.      Pulses: Normal pulses.      Heart sounds: Normal heart sounds. No murmur heard.  Pulmonary:      Effort: Pulmonary effort is normal. No respiratory distress.      Breath sounds: Normal breath sounds.   Abdominal:      General: Bowel sounds are normal. There is no distension.      Palpations: Abdomen is soft. There is no mass.      Tenderness: There is no abdominal tenderness.   Musculoskeletal:         General: No swelling or tenderness.      Cervical back: Normal range of motion and neck supple. No muscular tenderness.      Left hip: Deformity and bony tenderness present. Decreased range of motion.   Skin:     General: Skin is warm and dry.      Capillary Refill: Capillary refill takes less than 2 seconds.      Findings: No erythema or rash.   Neurological:      General: No focal deficit present.      Mental Status: She is alert and oriented to person, place, and time. Mental status is at baseline.   Psychiatric:         Mood and Affect: Mood normal.         Behavior: Behavior normal.         Thought Content: Thought content normal.         Judgment: Judgment normal.       Lines/Drains:              Lab Results: I have reviewed the following results:   Results from last 7 days   Lab Units 12/05/24  0531 12/04/24  1457   WBC Thousand/uL 10.74* 16.73*   HEMOGLOBIN g/dL 11.8 13.0   HEMATOCRIT % 37.4 41.1   PLATELETS Thousands/uL 201 260   SEGS PCT %  --  86*   LYMPHO PCT %  --  5*   MONO PCT %  --  7   EOS PCT %  --  1     Results from last 7 days   Lab Units 12/05/24  0531 12/04/24  1748 12/04/24  1457    SODIUM mmol/L 135   < > 131*   POTASSIUM mmol/L 4.7   < > 5.6*   CHLORIDE mmol/L 106   < > 98   CO2 mmol/L 25   < > 27   BUN mg/dL 14   < > 18   CREATININE mg/dL 0.53*   < > 0.73   ANION GAP mmol/L 4   < > 6   CALCIUM mg/dL 9.2   < > 10.2   ALBUMIN g/dL  --   --  4.4   TOTAL BILIRUBIN mg/dL  --   --  0.65   ALK PHOS U/L  --   --  60   ALT U/L  --   --  16   AST U/L  --   --  31   GLUCOSE RANDOM mg/dL 132   < > 147*    < > = values in this interval not displayed.     Results from last 7 days   Lab Units 12/04/24  1457   INR  1.14     Results from last 7 days   Lab Units 12/05/24  1141 12/05/24  0736 12/04/24  2113 12/04/24  1746   POC GLUCOSE mg/dl 111 125 203* 129               Recent Cultures (last 7 days):         Imaging Results Review: I reviewed radiology reports from this admission including: chest xray, CT head, and CT a/p.  Other Study Results Review: EKG was reviewed.     Last 24 Hours Medication List:     Current Facility-Administered Medications:     acetaminophen (TYLENOL) tablet 975 mg, Q8H JILLIAN    albuterol (PROVENTIL HFA,VENTOLIN HFA) inhaler 2 puff, Q6H PRN    ammonium lactate (LAC-HYDRIN) 12 % lotion, Daily    [Held by provider] aspirin (ECOTRIN LOW STRENGTH) EC tablet 81 mg, Daily    atorvastatin (LIPITOR) tablet 10 mg, Daily With Dinner    buPROPion (WELLBUTRIN XL) 24 hr tablet 300 mg, Daily    chlorhexidine gluconate (HIBICLENS) 4 % topical liquid, Daily PRN    clonazePAM (KlonoPIN) tablet 1 mg, QPM    cyanocobalamin (VITAMIN B-12) tablet 1,000 mcg, Daily    docusate sodium (COLACE) capsule 100 mg, BID    Fluticasone Furoate-Vilanterol 100-25 mcg/actuation 1 puff, Daily    insulin lispro (HumALOG/ADMELOG) 100 units/mL subcutaneous injection 1-6 Units, TID AC **AND** Fingerstick Glucose (POCT), TID AC    insulin lispro (HumALOG/ADMELOG) 100 units/mL subcutaneous injection 1-6 Units, HS    morphine injection 2 mg, Q4H PRN    nicotine (NICODERM CQ) 21 mg/24 hr TD 24 hr patch 1 patch, Daily     ondansetron (ZOFRAN) injection 4 mg, Q6H PRN    oxyCODONE (ROXICODONE) immediate release tablet 10 mg, Q4H PRN    oxyCODONE (ROXICODONE) IR tablet 5 mg, Q4H PRN    pantoprazole (PROTONIX) EC tablet 40 mg, Daily    polyethylene glycol (MIRALAX) packet 17 g, Daily    pregabalin (LYRICA) capsule 200 mg, TID    Administrative Statements   Today, Patient Was Seen By: ROXANE Carlin  I have spent a total time of 40 minutes in caring for this patient on the day of the visit/encounter including Diagnostic results, Prognosis, Risks and benefits of tx options, Instructions for management, Patient and family education, Importance of tx compliance, Risk factor reductions, Impressions, Counseling / Coordination of care, Documenting in the medical record, Reviewing / ordering tests, medicine, procedures  , Obtaining or reviewing history  , and Communicating with other healthcare professionals .    **Please Note: This note may have been constructed using a voice recognition system.**

## 2024-12-05 NOTE — PHYSICAL THERAPY NOTE
Physical Therapy Cancellation Note     12/05/24 0832   PT Last Visit   PT Visit Date 12/05/24   Note Type   Note type Cancelled Session   Cancel Reasons Patient to operating room     PT consult received and pertinent information reviewed in EMR. Upon diagnostic confirmation of hip fx, PT assessment and interventions will be held at this time. Will await further orthopedic consult, potential surgical intervention before mobilizing patient and completing functional task assessment.    Thalia Cassidy

## 2024-12-06 ENCOUNTER — ANESTHESIA (INPATIENT)
Dept: PERIOP | Facility: HOSPITAL | Age: 79
DRG: 481 | End: 2024-12-06
Payer: COMMERCIAL

## 2024-12-06 ENCOUNTER — APPOINTMENT (INPATIENT)
Dept: RADIOLOGY | Facility: HOSPITAL | Age: 79
DRG: 481 | End: 2024-12-06
Payer: COMMERCIAL

## 2024-12-06 ENCOUNTER — ANESTHESIA EVENT (INPATIENT)
Dept: PERIOP | Facility: HOSPITAL | Age: 79
DRG: 481 | End: 2024-12-06
Payer: COMMERCIAL

## 2024-12-06 LAB
ANION GAP SERPL CALCULATED.3IONS-SCNC: 4 MMOL/L (ref 4–13)
ATRIAL RATE: 109 BPM
BUN SERPL-MCNC: 9 MG/DL (ref 5–25)
CALCIUM SERPL-MCNC: 9.5 MG/DL (ref 8.4–10.2)
CHLORIDE SERPL-SCNC: 102 MMOL/L (ref 96–108)
CO2 SERPL-SCNC: 30 MMOL/L (ref 21–32)
CREAT SERPL-MCNC: 0.55 MG/DL (ref 0.6–1.3)
ERYTHROCYTE [DISTWIDTH] IN BLOOD BY AUTOMATED COUNT: 14.6 % (ref 11.6–15.1)
GFR SERPL CREATININE-BSD FRML MDRD: 90 ML/MIN/1.73SQ M
GLUCOSE SERPL-MCNC: 123 MG/DL (ref 65–140)
GLUCOSE SERPL-MCNC: 125 MG/DL (ref 65–140)
GLUCOSE SERPL-MCNC: 133 MG/DL (ref 65–140)
GLUCOSE SERPL-MCNC: 139 MG/DL (ref 65–140)
GLUCOSE SERPL-MCNC: 142 MG/DL (ref 65–140)
GLUCOSE SERPL-MCNC: 176 MG/DL (ref 65–140)
GLUCOSE SERPL-MCNC: 255 MG/DL (ref 65–140)
HCT VFR BLD AUTO: 41.7 % (ref 34.8–46.1)
HGB BLD-MCNC: 13.1 G/DL (ref 11.5–15.4)
MAGNESIUM SERPL-MCNC: 1.7 MG/DL (ref 1.9–2.7)
MCH RBC QN AUTO: 30.3 PG (ref 26.8–34.3)
MCHC RBC AUTO-ENTMCNC: 31.4 G/DL (ref 31.4–37.4)
MCV RBC AUTO: 96 FL (ref 82–98)
P AXIS: 44 DEGREES
PLATELET # BLD AUTO: 254 THOUSANDS/UL (ref 149–390)
PMV BLD AUTO: 10 FL (ref 8.9–12.7)
POTASSIUM SERPL-SCNC: 4.5 MMOL/L (ref 3.5–5.3)
PR INTERVAL: 168 MS
QRS AXIS: -8 DEGREES
QRSD INTERVAL: 130 MS
QT INTERVAL: 356 MS
QTC INTERVAL: 479 MS
RBC # BLD AUTO: 4.33 MILLION/UL (ref 3.81–5.12)
SODIUM SERPL-SCNC: 136 MMOL/L (ref 135–147)
T WAVE AXIS: 31 DEGREES
VENTRICULAR RATE: 109 BPM
WBC # BLD AUTO: 9.46 THOUSAND/UL (ref 4.31–10.16)

## 2024-12-06 PROCEDURE — 80048 BASIC METABOLIC PNL TOTAL CA: CPT | Performed by: NURSE PRACTITIONER

## 2024-12-06 PROCEDURE — 93005 ELECTROCARDIOGRAM TRACING: CPT

## 2024-12-06 PROCEDURE — 83735 ASSAY OF MAGNESIUM: CPT | Performed by: NURSE PRACTITIONER

## 2024-12-06 PROCEDURE — 73502 X-RAY EXAM HIP UNI 2-3 VIEWS: CPT

## 2024-12-06 PROCEDURE — C1713 ANCHOR/SCREW BN/BN,TIS/BN: HCPCS | Performed by: STUDENT IN AN ORGANIZED HEALTH CARE EDUCATION/TRAINING PROGRAM

## 2024-12-06 PROCEDURE — 99024 POSTOP FOLLOW-UP VISIT: CPT | Performed by: STUDENT IN AN ORGANIZED HEALTH CARE EDUCATION/TRAINING PROGRAM

## 2024-12-06 PROCEDURE — 82948 REAGENT STRIP/BLOOD GLUCOSE: CPT

## 2024-12-06 PROCEDURE — NC001 PR NO CHARGE: Performed by: STUDENT IN AN ORGANIZED HEALTH CARE EDUCATION/TRAINING PROGRAM

## 2024-12-06 PROCEDURE — 27245 TREAT THIGH FRACTURE: CPT | Performed by: STUDENT IN AN ORGANIZED HEALTH CARE EDUCATION/TRAINING PROGRAM

## 2024-12-06 PROCEDURE — 85027 COMPLETE CBC AUTOMATED: CPT | Performed by: NURSE PRACTITIONER

## 2024-12-06 PROCEDURE — 99232 SBSQ HOSP IP/OBS MODERATE 35: CPT | Performed by: INTERNAL MEDICINE

## 2024-12-06 PROCEDURE — C1769 GUIDE WIRE: HCPCS | Performed by: STUDENT IN AN ORGANIZED HEALTH CARE EDUCATION/TRAINING PROGRAM

## 2024-12-06 DEVICE — TFNA FENESTRATED SCREW 100MM - STERILE
Type: IMPLANTABLE DEVICE | Site: LEG | Status: FUNCTIONAL
Brand: TFN-ADVANCE

## 2024-12-06 DEVICE — LOCKING SCREW FOR IM NAIL Ø 5MM/ 36MM/ XL25/ STERILE: Type: IMPLANTABLE DEVICE | Site: LEG | Status: FUNCTIONAL

## 2024-12-06 DEVICE — 11MM/130 DEG TI CANN TFNA 170MM - STERILE
Type: IMPLANTABLE DEVICE | Site: LEG | Status: FUNCTIONAL
Brand: TFN-ADVANCE

## 2024-12-06 RX ORDER — SODIUM CHLORIDE, SODIUM LACTATE, POTASSIUM CHLORIDE, CALCIUM CHLORIDE 600; 310; 30; 20 MG/100ML; MG/100ML; MG/100ML; MG/100ML
20 INJECTION, SOLUTION INTRAVENOUS CONTINUOUS
Status: DISCONTINUED | OUTPATIENT
Start: 2024-12-06 | End: 2024-12-08

## 2024-12-06 RX ORDER — ONDANSETRON 2 MG/ML
INJECTION INTRAMUSCULAR; INTRAVENOUS AS NEEDED
Status: DISCONTINUED | OUTPATIENT
Start: 2024-12-06 | End: 2024-12-06

## 2024-12-06 RX ORDER — MAGNESIUM SULFATE HEPTAHYDRATE 40 MG/ML
2 INJECTION, SOLUTION INTRAVENOUS ONCE
Status: DISCONTINUED | OUTPATIENT
Start: 2024-12-06 | End: 2024-12-06 | Stop reason: HOSPADM

## 2024-12-06 RX ORDER — PROPOFOL 10 MG/ML
INJECTION, EMULSION INTRAVENOUS AS NEEDED
Status: DISCONTINUED | OUTPATIENT
Start: 2024-12-06 | End: 2024-12-06

## 2024-12-06 RX ORDER — MAGNESIUM SULFATE HEPTAHYDRATE 40 MG/ML
2 INJECTION, SOLUTION INTRAVENOUS ONCE
Status: COMPLETED | OUTPATIENT
Start: 2024-12-06 | End: 2024-12-06

## 2024-12-06 RX ORDER — SODIUM CHLORIDE 9 MG/ML
INJECTION, SOLUTION INTRAVENOUS AS NEEDED
Status: DISCONTINUED | OUTPATIENT
Start: 2024-12-06 | End: 2024-12-06 | Stop reason: HOSPADM

## 2024-12-06 RX ORDER — ROCURONIUM BROMIDE 10 MG/ML
INJECTION, SOLUTION INTRAVENOUS AS NEEDED
Status: DISCONTINUED | OUTPATIENT
Start: 2024-12-06 | End: 2024-12-06

## 2024-12-06 RX ORDER — MAGNESIUM SULFATE HEPTAHYDRATE 40 MG/ML
INJECTION, SOLUTION INTRAVENOUS AS NEEDED
Status: DISCONTINUED | OUTPATIENT
Start: 2024-12-06 | End: 2024-12-06

## 2024-12-06 RX ORDER — GLYCOPYRROLATE 0.2 MG/ML
INJECTION INTRAMUSCULAR; INTRAVENOUS AS NEEDED
Status: DISCONTINUED | OUTPATIENT
Start: 2024-12-06 | End: 2024-12-06

## 2024-12-06 RX ORDER — FENTANYL CITRATE 50 UG/ML
INJECTION, SOLUTION INTRAMUSCULAR; INTRAVENOUS AS NEEDED
Status: DISCONTINUED | OUTPATIENT
Start: 2024-12-06 | End: 2024-12-06

## 2024-12-06 RX ORDER — SODIUM CHLORIDE, SODIUM LACTATE, POTASSIUM CHLORIDE, CALCIUM CHLORIDE 600; 310; 30; 20 MG/100ML; MG/100ML; MG/100ML; MG/100ML
INJECTION, SOLUTION INTRAVENOUS CONTINUOUS PRN
Status: DISCONTINUED | OUTPATIENT
Start: 2024-12-06 | End: 2024-12-06

## 2024-12-06 RX ORDER — CEFAZOLIN SODIUM 2 G/50ML
2000 SOLUTION INTRAVENOUS ONCE
Status: COMPLETED | OUTPATIENT
Start: 2024-12-06 | End: 2024-12-06

## 2024-12-06 RX ORDER — FENTANYL CITRATE/PF 50 MCG/ML
25 SYRINGE (ML) INJECTION
Status: DISCONTINUED | OUTPATIENT
Start: 2024-12-06 | End: 2024-12-06 | Stop reason: HOSPADM

## 2024-12-06 RX ORDER — ONDANSETRON 2 MG/ML
4 INJECTION INTRAMUSCULAR; INTRAVENOUS ONCE AS NEEDED
Status: DISCONTINUED | OUTPATIENT
Start: 2024-12-06 | End: 2024-12-06 | Stop reason: HOSPADM

## 2024-12-06 RX ORDER — TRANEXAMIC ACID 10 MG/ML
1000 INJECTION, SOLUTION INTRAVENOUS ONCE
Status: COMPLETED | OUTPATIENT
Start: 2024-12-06 | End: 2024-12-06

## 2024-12-06 RX ORDER — CHLORHEXIDINE GLUCONATE ORAL RINSE 1.2 MG/ML
15 SOLUTION DENTAL ONCE
Status: COMPLETED | OUTPATIENT
Start: 2024-12-06 | End: 2024-12-06

## 2024-12-06 RX ORDER — DEXAMETHASONE SODIUM PHOSPHATE 10 MG/ML
INJECTION, SOLUTION INTRAMUSCULAR; INTRAVENOUS AS NEEDED
Status: DISCONTINUED | OUTPATIENT
Start: 2024-12-06 | End: 2024-12-06

## 2024-12-06 RX ORDER — BUPIVACAINE HYDROCHLORIDE AND EPINEPHRINE 5; 5 MG/ML; UG/ML
INJECTION, SOLUTION EPIDURAL; INTRACAUDAL; PERINEURAL AS NEEDED
Status: DISCONTINUED | OUTPATIENT
Start: 2024-12-06 | End: 2024-12-06 | Stop reason: HOSPADM

## 2024-12-06 RX ORDER — CEFADROXIL 500 MG/1
1000 CAPSULE ORAL EVERY 12 HOURS SCHEDULED
Status: DISCONTINUED | OUTPATIENT
Start: 2024-12-07 | End: 2024-12-11 | Stop reason: HOSPADM

## 2024-12-06 RX ADMIN — SUGAMMADEX 150 MG: 100 INJECTION, SOLUTION INTRAVENOUS at 17:17

## 2024-12-06 RX ADMIN — FENTANYL CITRATE 50 MCG: 50 INJECTION, SOLUTION INTRAMUSCULAR; INTRAVENOUS at 15:49

## 2024-12-06 RX ADMIN — ACETAMINOPHEN 975 MG: 325 TABLET ORAL at 05:19

## 2024-12-06 RX ADMIN — ACETAMINOPHEN 975 MG: 325 TABLET ORAL at 21:46

## 2024-12-06 RX ADMIN — SODIUM CHLORIDE, SODIUM LACTATE, POTASSIUM CHLORIDE, AND CALCIUM CHLORIDE 20 ML/HR: .6; .31; .03; .02 INJECTION, SOLUTION INTRAVENOUS at 20:08

## 2024-12-06 RX ADMIN — MORPHINE SULFATE 2 MG: 2 INJECTION, SOLUTION INTRAMUSCULAR; INTRAVENOUS at 08:39

## 2024-12-06 RX ADMIN — FENTANYL CITRATE 50 MCG: 50 INJECTION, SOLUTION INTRAMUSCULAR; INTRAVENOUS at 17:40

## 2024-12-06 RX ADMIN — MAGNESIUM SULFATE HEPTAHYDRATE 2 G: 40 INJECTION, SOLUTION INTRAVENOUS at 10:28

## 2024-12-06 RX ADMIN — TRANEXAMIC ACID 1000 MG: 10 INJECTION, SOLUTION INTRAVENOUS at 16:21

## 2024-12-06 RX ADMIN — SODIUM CHLORIDE, SODIUM LACTATE, POTASSIUM CHLORIDE, AND CALCIUM CHLORIDE: .6; .31; .03; .02 INJECTION, SOLUTION INTRAVENOUS at 17:27

## 2024-12-06 RX ADMIN — PREGABALIN 200 MG: 100 CAPSULE ORAL at 20:28

## 2024-12-06 RX ADMIN — PROPOFOL 150 MG: 10 INJECTION, EMULSION INTRAVENOUS at 15:49

## 2024-12-06 RX ADMIN — CLONAZEPAM 1 MG: 1 TABLET ORAL at 20:28

## 2024-12-06 RX ADMIN — MAGNESIUM SULFATE HEPTAHYDRATE 2 G: 40 INJECTION, SOLUTION INTRAVENOUS at 18:02

## 2024-12-06 RX ADMIN — GLYCOPYRROLATE 0.2 MG: 0.2 INJECTION INTRAMUSCULAR; INTRAVENOUS at 15:49

## 2024-12-06 RX ADMIN — OXYCODONE HYDROCHLORIDE 10 MG: 10 TABLET ORAL at 20:10

## 2024-12-06 RX ADMIN — FENTANYL CITRATE 50 MCG: 50 INJECTION, SOLUTION INTRAMUSCULAR; INTRAVENOUS at 16:41

## 2024-12-06 RX ADMIN — INSULIN LISPRO 3 UNITS: 100 INJECTION, SOLUTION INTRAVENOUS; SUBCUTANEOUS at 21:46

## 2024-12-06 RX ADMIN — DEXAMETHASONE SODIUM PHOSPHATE 4 MG: 10 INJECTION, SOLUTION INTRAMUSCULAR; INTRAVENOUS at 15:59

## 2024-12-06 RX ADMIN — ONDANSETRON 4 MG: 2 INJECTION INTRAMUSCULAR; INTRAVENOUS at 15:45

## 2024-12-06 RX ADMIN — CEFAZOLIN SODIUM 2000 MG: 2 SOLUTION INTRAVENOUS at 15:50

## 2024-12-06 RX ADMIN — SODIUM CHLORIDE, SODIUM LACTATE, POTASSIUM CHLORIDE, AND CALCIUM CHLORIDE: .6; .31; .03; .02 INJECTION, SOLUTION INTRAVENOUS at 15:45

## 2024-12-06 RX ADMIN — ROCURONIUM BROMIDE 30 MG: 10 INJECTION, SOLUTION INTRAVENOUS at 15:49

## 2024-12-06 RX ADMIN — FENTANYL CITRATE 50 MCG: 50 INJECTION, SOLUTION INTRAMUSCULAR; INTRAVENOUS at 16:33

## 2024-12-06 RX ADMIN — DOCUSATE SODIUM 100 MG: 100 CAPSULE, LIQUID FILLED ORAL at 20:29

## 2024-12-06 RX ADMIN — CHLORHEXIDINE GLUCONATE 15 ML: 1.2 RINSE ORAL at 13:44

## 2024-12-06 NOTE — ASSESSMENT & PLAN NOTE
BP is relatively controlled. Some hypotension noted 12/5 possible related to pain medications.   Will hold off amiloride and lisinopril for now   Monitor BP closely and re-initiate medications as appropriate

## 2024-12-06 NOTE — ASSESSMENT & PLAN NOTE
Patient is POD0 s/p left hip IMN    - WBAT  - Abx: ancef x24 hrs, then duricef 500 mg bid for 5 days  - DVT ppx: can restart home eliquis and ASA POD1  - PT/OT  - Pain control per primary team  - Medical management per primary team  - Case management consult for dispo planning

## 2024-12-06 NOTE — ASSESSMENT & PLAN NOTE
Lab Results   Component Value Date    HGBA1C 7.4 (H) 10/24/2024       Recent Labs     12/05/24  1629 12/05/24  2110 12/06/24  0643 12/06/24  1050   POCGLU 156* 131 142* 139       Blood Sugar Average: Last 72 hrs:  (P) 142  Continue to hold metformin and Januvia from home  SSI  Diabetic diet  Continue Lyrica for polyneuropathy.

## 2024-12-06 NOTE — PROGRESS NOTES
Progress Note - Orthopedics   Name: Lona Cedeno 79 y.o. female I MRN: [id number]  Unit/Bed#: OR POOL I Date of Admission: 12/4/2024   Date of Service: 12/6/2024 I Hospital Day: 2    Assessment & Plan  Closed left hip fracture, initial encounter ([facility])    Plan for left hip IMN today. Discussed the risks and benefits of surgery with the patient. Patient was amenable to surgery and signed the surgical consent. Left leg marked. Patient optimized by medicine. Patient off xarelto and ASA for over 24 hrs. All questions answered.    Type 2 diabetes mellitus with diabetic polyneuropathy, without long-term current use of insulin ([facility])  Lab Results   Component Value Date    HGBA1C 7.4 (H) 10/24/2024       Recent Labs     12/06/24  0643 12/06/24  1050 12/06/24  1305 12/06/24  1317   POCGLU 142* 139 125 123       Blood Sugar Average: Last 72 hrs:  (P) 138.4    Essential hypertension    Anxiety and depression    PAD (peripheral artery disease) ([facility])    COPD, severity to be determined ([facility])    Hyperkalemia    Hyponatremia    Acute respiratory insufficiency    Preoperative clearance    SIRS (systemic inflammatory response syndrome) ([facility])    Urinary retention      Orthopedics service will follow.    Subjective   79 y.o.female with left intertrochanteric hip fracture after a fall on 12/4/2024. Surgery delayed due to patient being on Xarelto which has been held.  Patient was seen in her room today. She endorses pain in the left hip only. Denies fevers, chills, CP, SOB, N/V, numbness or tingling.     Objective :  Temp:  [97.7 °F (36.5 °C)-98.2 °F (36.8 °C)] 97.7 °F (36.5 °C)  HR:  [84-90] 87  BP: (116-162)/(50-84) 162/66  Resp:  [16-18] 18  SpO2:  [78 %-96 %] 96 %  O2 Device: Nasal cannula  Nasal Cannula O2 Flow Rate (L/min):  [3 L/min] 3 L/min    Physical Exam  Musculoskeletal: leftlower  Skin intact. No erythema or ecchymosis.  TTP left hip and thigh  Motor intact to +FHL/EHL, +ankle dorsi/plantar flexion  Sensation intact to  saphenous, sural, tibial, superficial peroneal nerve, and deep peroneal  2+ DP pulse  No calf swelling or tenderness to palpation      Lab Results: I have reviewed the following results:  Recent Labs     12/04/24  1457 12/04/24  1748 12/05/24  0531 12/06/24  0508   WBC 16.73*  --  10.74* 9.46   HGB 13.0  --  11.8 13.1   HCT 41.1  --  37.4 41.7     --  201 254   BUN 18 16 14 9   CREATININE 0.73 0.60 0.53* 0.55*   PTT 29  --   --   --    INR 1.14  --   --   --      Blood Culture:    Lab Results   Component Value Date    BLOODCX No Growth After 5 Days. 10/23/2024     Wound Culture:   Lab Results   Component Value Date    WOUNDCULT 1+ Growth of Klebsiella pneumoniae (A) 02/14/2024    WOUNDCULT 1+ Growth of Proteus vulgaris group (A) 02/14/2024    WOUNDCULT Few Colonies of 02/14/2024

## 2024-12-06 NOTE — INTERIM OP NOTE
INSERTION NAIL IM FEMUR ANTEGRADE (TROCHANTERIC)  Postoperative Note  PATIENT NAME: Lona Cedeno  : 1945  MRN: [id number]  [facility] OR ROOM 01    Surgery Date: 2024    Preop Diagnosis:  Closed fracture of left hip, initial encounter ([facility]) [S72.002A]    Post-Op Diagnosis Codes:     * Closed fracture of left hip, initial encounter ([facility]) [S72.002A]    Procedure(s) (LRB):  INSERTION NAIL IM FEMUR ANTEGRADE (TROCHANTERIC) (Left)    Surgeons and Role:     * Rigo Prieto MD - Primary    Specimens:  * No specimens in log *    Estimated Blood Loss:   50 mL    Anesthesia Type:   Choice     Findings:    Mildly displaced intertrochanteric hip fracture, left    Complications:   None      SIGNATURE: Rigo Prieto MD   DATE: 2024   TIME: 6:09 PM

## 2024-12-06 NOTE — ANESTHESIA PREPROCEDURE EVALUATION
Procedure:  INSERTION NAIL IM FEMUR ANTEGRADE (TROCHANTERIC) (Left: Leg Upper)  Admitted after groun level fall on 12/4    Last dose of xarelto 12/3  Obesity  PAD  COPD on chronic O2 2L at home. Requiring anywhere from 3-4L in the hospital    Code status discussed with patient. She would like to be a full code for this procedure    Relevant Problems   CARDIO   (+) Essential hypertension   (+) PAD (peripheral artery disease) (HCC)      ENDO   (+) Type 2 diabetes mellitus with diabetic polyneuropathy, without long-term current use of insulin (HCC)      GI/HEPATIC   (+) GERD without esophagitis      HEMATOLOGY   (+) Anemia   (+) Other dietary vitamin B12 deficiency anemia      MUSCULOSKELETAL   (+) Degenerative lumbar disc      NEURO/PSYCH   (+) Anxiety and depression   (+) Type 2 diabetes mellitus with diabetic polyneuropathy, without long-term current use of insulin (HCC)      PULMONARY   (+) Acute respiratory failure with hypoxia ([facility])   (+) COPD, severity to be determined ([facility])      Echo 8/2023    Left Ventricle: Left ventricular cavity size is normal. Wall thickness is mild-moderately increased. The left ventricular ejection fraction is 50-55%. Systolic function is low normal. Although no diagnostic regional wall motion abnormality was identified, this possibility cannot be completely excluded on the basis of this study. Diastolic function is mildly abnormal, consistent with grade I (abnormal) relaxation.    Left Atrium: The atrium is mildly dilated.    Mitral Valve: There is mild annular calcification. There is mild regurgitation.    Tricuspid Valve: There is mild to moderate regurgitation.    Pulmonic Valve: There is mild regurgitation.    Physical Exam    Airway    Mallampati score: IV  TM Distance: <3 FB  Neck ROM: limited     Dental        Cardiovascular  Cardiovascular exam normal    Pulmonary  Pulmonary exam normal     Other Findings  post-pubertal.      Anesthesia Plan  ASA Score- 3     Anesthesia Type-  general with ASA Monitors.         Additional Monitors:     Airway Plan: ETT.    Comment: Risks/benefits and alternatives discussed with patient including possible PONV, sore throat, damage to teeth/lips/gums/esophagus, and possibility of rare anesthetic and surgical emergencies including but not limited to heart attack, stroke, and/or death. All questions were answered.\.       Plan Factors-Exercise tolerance (METS): <4 METS.    Chart reviewed.   Existing labs reviewed. Patient summary reviewed.    Patient is a current smoker.              Induction- intravenous.    Postoperative Plan- Plan for postoperative opioid use. Planned trial extubation    Perioperative Resuscitation Plan - Level 1 - Full Code.       Informed Consent- Anesthetic plan and risks discussed with patient.  I personally reviewed this patient with the CRNA. Discussed and agreed on the Anesthesia Plan with the CRNA..

## 2024-12-06 NOTE — ASSESSMENT & PLAN NOTE
Plan for left hip IMN today. Discussed the risks and benefits of surgery with the patient. Patient was amenable to surgery and signed the surgical consent. Left leg marked. Patient optimized by medicine. Patient off xarelto and ASA for over 24 hrs. All questions answered.

## 2024-12-06 NOTE — PLAN OF CARE
Problem: Potential for Falls  Goal: Patient will remain free of falls  Description: INTERVENTIONS:  - Educate patient/family on patient safety including physical limitations  - Instruct patient to call for assistance with activity   - Consult OT/PT to assist with strengthening/mobility   - Keep Call bell within reach  - Keep bed low and locked with side rails adjusted as appropriate  - Keep care items and personal belongings within reach  - Initiate and maintain comfort rounds  - Make Fall Risk Sign visible to staff  - Offer Toileting every 2 Hours, in advance of need  - Initiate/Maintain bed alarm  - Obtain necessary fall risk management equipment: yellow socks  - Apply yellow socks and bracelet for high fall risk patients  - Consider moving patient to room near nurses station  Outcome: Progressing     Problem: Prexisting or High Potential for Compromised Skin Integrity  Goal: Skin integrity is maintained or improved  Description: INTERVENTIONS:  - Identify patients at risk for skin breakdown  - Assess and monitor skin integrity  - Assess and monitor nutrition and hydration status  - Monitor labs   - Assess for incontinence   - Turn and reposition patient  - Assist with mobility/ambulation  - Relieve pressure over bony prominences  - Avoid friction and shearing  - Provide appropriate hygiene as needed including keeping skin clean and dry  - Evaluate need for skin moisturizer/barrier cream  - Collaborate with interdisciplinary team   - Patient/family teaching  - Consider wound care consult   Outcome: Progressing     Problem: PAIN - ADULT  Goal: Verbalizes/displays adequate comfort level or baseline comfort level  Description: Interventions:  - Encourage patient to monitor pain and request assistance  - Assess pain using appropriate pain scale  - Administer analgesics based on type and severity of pain and evaluate response  - Implement non-pharmacological measures as appropriate and evaluate response  - Consider  cultural and social influences on pain and pain management  - Notify physician/advanced practitioner if interventions unsuccessful or patient reports new pain  Outcome: Progressing     Problem: INFECTION - ADULT  Goal: Absence or prevention of progression during hospitalization  Description: INTERVENTIONS:  - Assess and monitor for signs and symptoms of infection  - Monitor lab/diagnostic results  - Monitor all insertion sites, i.e. indwelling lines, tubes, and drains  - Monitor endotracheal if appropriate and nasal secretions for changes in amount and color  - [place] appropriate cooling/warming therapies per order  - Administer medications as ordered  - Instruct and encourage patient and family to use good hand hygiene technique  - Identify and instruct in appropriate isolation precautions for identified infection/condition  Outcome: Progressing     Problem: SAFETY ADULT  Goal: Patient will remain free of falls  Description: INTERVENTIONS:  - Educate patient/family on patient safety including physical limitations  - Instruct patient to call for assistance with activity   - Consult OT/PT to assist with strengthening/mobility   - Keep Call bell within reach  - Keep bed low and locked with side rails adjusted as appropriate  - Keep care items and personal belongings within reach  - Initiate and maintain comfort rounds  - Make Fall Risk Sign visible to staff  - Offer Toileting every 2 Hours, in advance of need  - Initiate/Maintain bed alarm  - Obtain necessary fall risk management equipment: yellow socks  - Apply yellow socks and bracelet for high fall risk patients  - Consider moving patient to room near nurses station  Outcome: Progressing  Goal: Maintain or return to baseline ADL function  Description: INTERVENTIONS:  -  Assess patient's ability to carry out ADLs; assess patient's baseline for ADL function and identify physical deficits which impact ability to perform ADLs (bathing, care of mouth/teeth,  toileting, grooming, dressing, etc.)  - Assess/evaluate cause of self-care deficits   - Assess range of motion  - Assess patient's mobility; develop plan if impaired  - Assess patient's need for assistive devices and provide as appropriate  - Encourage maximum independence but intervene and supervise when necessary  - Involve family in performance of ADLs  - Assess for home care needs following discharge   - Consider OT consult to assist with ADL evaluation and planning for discharge  - Provide patient education as appropriate  Outcome: Progressing  Goal: Maintains/Returns to pre admission functional level  Description: INTERVENTIONS:  - Perform AM-PAC 6 Click Basic Mobility/ Daily Activity assessment daily.  - Set and communicate daily mobility goal to care team and patient/family/caregiver.   - Collaborate with rehabilitation services on mobility goals if consulted  - Perform Range of Motion 3 times a day.  - Reposition patient every 2 hours.  - Dangle patient 3 times a day  - Stand patient 3 times a day  - Ambulate patient 3 times a day  - Out of bed to chair 3 times a day   - Out of bed for meals 3 times a day  - Out of bed for toileting  - Record patient progress and toleration of activity level   Outcome: Progressing     Problem: DISCHARGE PLANNING  Goal: Discharge to home or other facility with appropriate resources  Description: INTERVENTIONS:  - Identify barriers to discharge w/patient and caregiver  - Arrange for needed discharge resources and transportation as appropriate  - Identify discharge learning needs (meds, wound care, etc.)  - Arrange for interpretive services to assist at discharge as needed  - Refer to Case Management Department for coordinating discharge planning if the patient needs post-hospital services based on physician/advanced practitioner order or complex needs related to functional status, cognitive ability, or social support system  Outcome: Progressing     Problem: Knowledge  Deficit  Goal: Patient/family/caregiver demonstrates understanding of disease process, treatment plan, medications, and discharge instructions  Description: Complete learning assessment and assess knowledge base.  Interventions:  - Provide teaching at level of understanding  - Provide teaching via preferred learning methods  Outcome: Progressing

## 2024-12-06 NOTE — ANESTHESIA POSTPROCEDURE EVALUATION
Post-Op Assessment Note    CV Status:  Stable  Pain Score: 0    Pain management: adequate       Mental Status:  Alert and awake   Hydration Status:  Euvolemic   PONV Controlled:  Controlled   Airway Patency:  Patent     Post Op Vitals Reviewed: Yes    No anethesia notable event occurred.    Staff: CRNA           Last Filed PACU Vitals:  Vitals Value Taken Time   Temp 100.8    Pulse 128 12/06/24 1800   /73 12/06/24 1800   Resp 18 12/06/24 1800   SpO2 93 % 12/06/24 1800   Vitals shown include unfiled device data.    Modified Srinivas:  No data recorded

## 2024-12-06 NOTE — ASSESSMENT & PLAN NOTE
Noted to have WBC of 16.73, HR 91, and tachypnea   Suspect that SIRS criteria is likely due to recent fall with left hip fracture. No acute sign of infection at this time.   UA- negative   Will monitor off antibiotic  SIRS parameters resolved

## 2024-12-06 NOTE — ASSESSMENT & PLAN NOTE
On aspirin 81 mg daily, Xarelto 2.5 mg twice daily and statin   Continue to hold ASA and xarelto for now pending orthopedics surgery later today  Continue statin

## 2024-12-06 NOTE — ANESTHESIA POSTPROCEDURE EVALUATION
Post-Op Assessment Note    Last Filed PACU Vitals:  Vitals Value Taken Time   Temp 100.9 °F (38.3 °C) 12/06/24 1757   Pulse 104 12/06/24 1847   /63 12/06/24 1846   Resp 14 12/06/24 1847   SpO2 93 % 12/06/24 1847   Vitals shown include unfiled device data.    Modified Srinivas:  Activity: 2 (12/6/2024  5:57 PM)  Respiration: 2 (12/6/2024  5:57 PM)  Circulation: 2 (12/6/2024  5:57 PM)  Consciousness: 1 (12/6/2024  5:57 PM)  Oxygen Saturation: 1 (12/6/2024  5:57 PM)  Modified Srinivas Score: 8 (12/6/2024  5:57 PM)

## 2024-12-06 NOTE — ASSESSMENT & PLAN NOTE
Likely hyponatremia hypovolemia given that she was on the floor for a while with decreased p.o. intake  Received gentle IV fluids   Resolved- Monitor BMP

## 2024-12-06 NOTE — OCCUPATIONAL THERAPY NOTE
Occupational Therapy Cancel       12/06/24 0851   OT Last Visit   OT Visit Date 12/06/24   Note Type   Note type Cancelled Session   Cancel Reasons Patient to operating room   Additional Comments Pt chart reviewed. OT consult recieved. Pt to operating room today. Will cancel evaluation and reassess as appropriate.     Oanh Fernández OT          Patient Name: Lona Cedeno  Today's Date: 12/6/2024

## 2024-12-06 NOTE — DISCHARGE INSTR - AVS FIRST PAGE
ORTHOPEDICS - HIP INTRAMEDULLARY NAIL  POST OPERATIVE INSTRUCTIONS      Activity:  Weight Bearing Status: weight bearing as tolerated on the operative leg. Use crutches or a walker to aid in ambulation until comfortable putting full weight on the operative leg.    Pump your ankle up and down frequently throughout the day to facilitate circulation, maintain muscle tone, and to aid in reduction of swelling.    You should start physical therapy as soon as possible after discharge.       DVT prophylaxis:  Anticoagulation for 6 weeks postop - recommend Lovenox 30 mg twice a day for 2 weeks, then Aspirin 81 mg twice a day for 4 weeks      Pain:  Continue analgesics as directed.  You may use ice packs as needed for pain and swelling.  20 minutes is required to allow the cold to penetrate deep enough.  Cover skin with a layer of cloth before applying the ice pack.        Dressing Instructions:   Please keep clean, dry and intact until follow up   You may shower over the dressing starting 5 days after surgery, but do not scrub the area or soak in a bath  Staples or sutures were used to close the incision, they will be removed at your post-operative appointment       Appt Instructions:   If you do not have an appointment, please call the clinic at [phone] to schedule a follow-up appointment with Dr. Prieto 2 weeks after your surgery. Otherwise, follow-up as scheduled.     Contact the office sooner if you experience uncontrolled pain, increased numbness/tingling in the extremities, fevers, chills, redness or drainage around the incision.

## 2024-12-06 NOTE — PLAN OF CARE
Problem: Potential for Falls  Goal: Patient will remain free of falls  Description: INTERVENTIONS:  - Educate patient/family on patient safety including physical limitations  - Instruct patient to call for assistance with activity   - Consult OT/PT to assist with strengthening/mobility   - Keep Call bell within reach  - Keep bed low and locked with side rails adjusted as appropriate  - Keep care items and personal belongings within reach  - Initiate and maintain comfort rounds  - Make Fall Risk Sign visible to staff  - Offer Toileting every 2 Hours, in advance of need  - Initiate/Maintain bed alarm  - Obtain necessary fall risk management equipment: non slip socks  - Apply yellow socks and bracelet for high fall risk patients  - Consider moving patient to room near nurses station  Outcome: Progressing     Problem: Prexisting or High Potential for Compromised Skin Integrity  Goal: Skin integrity is maintained or improved  Description: INTERVENTIONS:  - Identify patients at risk for skin breakdown  - Assess and monitor skin integrity  - Assess and monitor nutrition and hydration status  - Monitor labs   - Assess for incontinence   - Turn and reposition patient  - Assist with mobility/ambulation  - Relieve pressure over bony prominences  - Avoid friction and shearing  - Provide appropriate hygiene as needed including keeping skin clean and dry  - Evaluate need for skin moisturizer/barrier cream  - Collaborate with interdisciplinary team   - Patient/family teaching  - Consider wound care consult   Outcome: Progressing     Problem: PAIN - ADULT  Goal: Verbalizes/displays adequate comfort level or baseline comfort level  Description: Interventions:  - Encourage patient to monitor pain and request assistance  - Assess pain using appropriate pain scale  - Administer analgesics based on type and severity of pain and evaluate response  - Implement non-pharmacological measures as appropriate and evaluate response  - Consider  cultural and social influences on pain and pain management  - Notify physician/advanced practitioner if interventions unsuccessful or patient reports new pain  Outcome: Progressing     Problem: INFECTION - ADULT  Goal: Absence or prevention of progression during hospitalization  Description: INTERVENTIONS:  - Assess and monitor for signs and symptoms of infection  - Monitor lab/diagnostic results  - Monitor all insertion sites, i.e. indwelling lines, tubes, and drains  - Monitor endotracheal if appropriate and nasal secretions for changes in amount and color  - [place] appropriate cooling/warming therapies per order  - Administer medications as ordered  - Instruct and encourage patient and family to use good hand hygiene technique  - Identify and instruct in appropriate isolation precautions for identified infection/condition  Outcome: Progressing     Problem: SAFETY ADULT  Goal: Patient will remain free of falls  Description: INTERVENTIONS:  - Educate patient/family on patient safety including physical limitations  - Instruct patient to call for assistance with activity   - Consult OT/PT to assist with strengthening/mobility   - Keep Call bell within reach  - Keep bed low and locked with side rails adjusted as appropriate  - Keep care items and personal belongings within reach  - Initiate and maintain comfort rounds  - Make Fall Risk Sign visible to staff  - Offer Toileting every 2 Hours, in advance of need  - Initiate/Maintain bed alarm  - Obtain necessary fall risk management equipment: non slip socks   - Apply yellow socks and bracelet for high fall risk patients  - Consider moving patient to room near nurses station  Outcome: Progressing  Goal: Maintain or return to baseline ADL function  Description: INTERVENTIONS:  -  Assess patient's ability to carry out ADLs; assess patient's baseline for ADL function and identify physical deficits which impact ability to perform ADLs (bathing, care of mouth/teeth,  toileting, grooming, dressing, etc.)  - Assess/evaluate cause of self-care deficits   - Assess range of motion  - Assess patient's mobility; develop plan if impaired  - Assess patient's need for assistive devices and provide as appropriate  - Encourage maximum independence but intervene and supervise when necessary  - Involve family in performance of ADLs  - Assess for home care needs following discharge   - Consider OT consult to assist with ADL evaluation and planning for discharge  - Provide patient education as appropriate  Outcome: Progressing  Goal: Maintains/Returns to pre admission functional level  Description: INTERVENTIONS:  - Perform AM-PAC 6 Click Basic Mobility/ Daily Activity assessment daily.  - Set and communicate daily mobility goal to care team and patient/family/caregiver.   - Collaborate with rehabilitation services on mobility goals if consulted  - Perform Range of Motion 2 times a day.  - Reposition patient every 2 hours.  - Dangle patient 3 times a day  - Stand patient 3 times a day  - Ambulate patient 3 times a day  - Out of bed to chair 3 times a day   - Out of bed for meals 3 times a day  - Out of bed for toileting  - Record patient progress and toleration of activity level   Outcome: Progressing     Problem: DISCHARGE PLANNING  Goal: Discharge to home or other facility with appropriate resources  Description: INTERVENTIONS:  - Identify barriers to discharge w/patient and caregiver  - Arrange for needed discharge resources and transportation as appropriate  - Identify discharge learning needs (meds, wound care, etc.)  - Arrange for interpretive services to assist at discharge as needed  - Refer to Case Management Department for coordinating discharge planning if the patient needs post-hospital services based on physician/advanced practitioner order or complex needs related to functional status, cognitive ability, or social support system  Outcome: Progressing     Problem: Knowledge  Deficit  Goal: Patient/family/caregiver demonstrates understanding of disease process, treatment plan, medications, and discharge instructions  Description: Complete learning assessment and assess knowledge base.  Interventions:  - Provide teaching at level of understanding  - Provide teaching via preferred learning methods  Outcome: Progressing

## 2024-12-06 NOTE — ASSESSMENT & PLAN NOTE
Lab Results   Component Value Date    HGBA1C 7.4 (H) 10/24/2024       Recent Labs     12/06/24  0643 12/06/24  1050 12/06/24  1305 12/06/24  1317   POCGLU 142* 139 125 123       Blood Sugar Average: Last 72 hrs:  (P) 138.4

## 2024-12-06 NOTE — ASSESSMENT & PLAN NOTE
Noted SpO2 of 86% on room air in the ED.   The patient chronically wears 2 L nasal cannula intermittently at home   Cxr (12/4)- no acute cardiopulmonary finding  Noted increase O2 requirement to 4L NC, now down to 3 L today  Repeat cxr negaitve; possibly related to atelectasis  Continue with oxygen supplement  Monitor to keep SpO2 greater than 88%.  Incentive spirometry, cough and deep breathing

## 2024-12-06 NOTE — ASSESSMENT & PLAN NOTE
The patient presented after a fall from standing at home this morning.  She reports that she lost her balance and landed on her left side.  Imaging shows comminuted nondisplaced intertrochanteric fracture left proximal femur   Trauma work-up is negative in ED except left hip fracture.   CK normalized with IV fluids   Orthopedics input appreciated. Plan for surgical intervention on 12/6   Continue to hold aspirin and Xarelto.  Last dose Xarelto 2.5 mg was in the evening of 12/3.  Pain management  PT/OT evaluation once cleared by orthopedics  DVT prophylaxis-SCDs for now

## 2024-12-06 NOTE — PHYSICAL THERAPY NOTE
Physical Therapy Cancellation Note       12/06/24 0910   PT Last Visit   PT Visit Date 12/06/24   Note Type   Note type Cancelled Session   Cancel Reasons Patient to operating room     PT consult received and pertinent information reviewed in EMR. Upon diagnostic confirmation of hip fx, PT assessment and interventions will be held at this time. Will await further orthopedic consult, potential surgical intervention before mobilizing patient and completing functional task assessment.    Thalia Cassidy

## 2024-12-06 NOTE — PROGRESS NOTES
Progress Note - Orthopedics   Name: Lona Cedeno 79 y.o. female I MRN: [id number]  Unit/Bed#: OR POOL I Date of Admission: 12/4/2024   Date of Service: 12/6/2024 I Hospital Day: 2    Assessment & Plan  Closed left hip fracture, initial encounter ([facility])    Patient is POD0 s/p left hip IMN    - WBAT  - Abx: ancef x24 hrs, then duricef 500 mg bid for 5 days  - DVT ppx: can restart home eliquis and ASA POD1  - PT/OT  - Pain control per primary team  - Medical management per primary team  - Case management consult for dispo planning    Type 2 diabetes mellitus with diabetic polyneuropathy, without long-term current use of insulin ([facility])  Lab Results   Component Value Date    HGBA1C 7.4 (H) 10/24/2024       Recent Labs     12/06/24  0643 12/06/24  1050 12/06/24  1305 12/06/24  1317   POCGLU 142* 139 125 123       Blood Sugar Average: Last 72 hrs:  (P) 138.4    Essential hypertension    Anxiety and depression    PAD (peripheral artery disease) ([facility])    COPD, severity to be determined ([facility])    Hyperkalemia    Hyponatremia    Acute respiratory insufficiency    Preoperative clearance    SIRS (systemic inflammatory response syndrome) ([facility])    Urinary retention      Orthopedics service will follow.        Subjective   79 y.o.female s/p left hip IMN. Pain controlled. Patient endorses nausea and has some confusion in the PACU.    Objective :  Temp:  [97.7 °F (36.5 °C)-98.2 °F (36.8 °C)] 97.7 °F (36.5 °C)  HR:  [84-90] 87  BP: (129-162)/(50-66) 162/66  Resp:  [16-18] 18  SpO2:  [87 %-96 %] 96 %  O2 Device: Nasal cannula  Nasal Cannula O2 Flow Rate (L/min):  [3 L/min] 3 L/min    Physical Exam  Musculoskeletal: leftlower  No erythema or ecchymosis.  Dressing c/d/i  TTP over left hip at surgical site  Motor intact to +FHL/EHL, +ankle dorsi/plantar flexion  Sensation intact to saphenous, sural, tibial, superficial peroneal nerve, and deep peroneal  1+ DP, PT pulses; Dopplerable DP pulse.  No calf swelling or tenderness to  palpation      Lab Results: I have reviewed the following results:  Recent Labs     12/04/24  1457 12/04/24  1748 12/05/24  0531 12/06/24  0508   WBC 16.73*  --  10.74* 9.46   HGB 13.0  --  11.8 13.1   HCT 41.1  --  37.4 41.7     --  201 254   BUN 18 16 14 9   CREATININE 0.73 0.60 0.53* 0.55*   PTT 29  --   --   --    INR 1.14  --   --   --      Blood Culture:    Lab Results   Component Value Date    BLOODCX No Growth After 5 Days. 10/23/2024     Wound Culture:   Lab Results   Component Value Date    WOUNDCULT 1+ Growth of Klebsiella pneumoniae (A) 02/14/2024    WOUNDCULT 1+ Growth of Proteus vulgaris group (A) 02/14/2024    WOUNDCULT Few Colonies of 02/14/2024

## 2024-12-06 NOTE — OP NOTE
OPERATIVE REPORT    PATIENT NAME: Lona Cedeno   :  1945  MRN: [id number]  Pt Location: [facility] OR ROOM 01    SURGERY DATE: 2024    SURGEON(S) and ROLE:  Primary: Rigo Prieto MD    PREOPERATIVE DIAGNOSES:  Left displaced intertrochanteric hip fracture    POSTOPERATIVE DIAGNOSES:  Same as Preoperative Diagnosis    PROCEDURES:  Left hip intramedullary nail    ANESTHESIA TYPE:  General endotracheal    ANESTHESIA STAFF:   Anesthesiologist: Bette Larson DO  CRNA: Sebastian Akers CRNA    ESTIMATED BLOOD LOSS:  50 mL    PERIOPERATIVE ANTIBIOTICS:  cefazolin, 2 grams    IMPLANTS:    Implant Name Type Inv. Item Serial No.  Lot No. LRB No. Used Action   NAIL IM 11MM 130 DEG TI ELIAS TFNA 170MM RT STRL - SN/A  NAIL IM 11MM 130 DEG TI ELIAS TFNA 170MM RT STRL N/A Erly [id number] Left 1 Implanted   CATALINA REAMING 2.5MM 950MM BALL TIP TI - SN/A  CATALINA REAMING 2.5MM 950MM BALL TIP TI N/A Erly [id number] Left 1 Implanted   SCREW TFNA FENESTRATED 100MM  STRL - SN/A  SCREW TFNA FENESTRATED 100MM  STRL N/A Erly [id number] Left 1 Implanted   SCREW RETAIING LCK 5 X 36MM F/IM NAIL [id number] STRL - SN/A  SCREW RETAIING LCK 5 X 36MM F/IM NAIL [id number] STRL N/A Erly [id number] Left 1 Implanted       SPECIMENS: None      OPERATIVE INDICATIONS:  Patient is a 79-year-old female that ambulates with a cane or walker who sustained a left displaced intertrochanteric hip fracture after a fall at home on 2024.  The indication for operative intervention was a displaced intertrochanteric hip fracture. I discussed both nonoperative and operative intervention with the patient.  I recommended operative intervention for pain control and mobilization.  All risks and benefits of the procedure were discussed with the patient in great detail including but not limited to bleeding, infection, blood clots, pain, stiffness, neurovascular damage, nonunion, malunion, fracture, hardware failure, wound complications, anesthesia  complications, the possibility loss of life and surgery, heart attack, stroke.  All of the patient's questions were answered. The patient understood these risks and alternatives and elected to proceed with surgery.  The patient was evaluated and cleared by both medicine and pulmonology.     PROCEDURE AND TECHNIQUE:  After the patient, site, and procedure were confirmed in the preoperative area, the patient was brought into the operating room in a supine position.  General anaesthesia was provided.  The patient was placed on the fracture table and all bony prominences were padded. C arm imaging confirmed ability to obtain appropriate images. Patient was then prepped and draped in the normal sterile fashion.  Implants were in the room and a timeout was performed involving the surgeon, circulator, and anesthesia team addressing the correct patient, laterality, surgical site, procedure, DVT prophylaxis, TXA, and antibiotics administration.     The traction table was used to apply traction and adjust rotation of the operative leg. Reduction was assessed using fluoroscopy focusing on restoring length, alignment, and rotation of the femur.     Once an adequate reduction was obtained, an incision was made 2 cm proximal to the greater trochanteric region in line with the femur and this was extended proximally about 4-5 cm.  Sharp dissection was performed through skin, subcutaneous tissue, and fascia.  The guidewire was placed at the greater trochanteric region and advanced past the lesser trochanteric region. This was confirmed on both AP and lateral films. Opening reamer was then used. The reamer and guidewire were removed and a 11 x 170 mm, 130 degree short nail was inserted into the canal. Based on preoperative imaging, there was concern that the femoral canal may be narrow and would require reaming, Upon placing the nail, there was resistance before final positioning of the nail. Given this along with preoperative  measurements, the nail was removed for canal reaming. A ball tip guidewire was inserted down to the physeal scar and appropriate placement was confirmed with orthogonal x-rays. An 8.5 mm cutting reamer was then inserted over the guidewire and the femur was sequentally reamed up to 12.5 mm.  The nail was then inserted over the guidewire and appropriate placement was confirmed with orthogonal films. The guidewire was removed from the femoral canal.      An incision was made over the lateral thigh based on the nail jig for placement of the proximal interlock screw. A pin was inserted through the guide into the femoral head and appropriate placement was confirmed on orthogonal films.  An anti-rotation pin was also placed through the jig.  Measurement for the screw length was performed and the lateral cortex reamer was used. A step drill was used over the guidepin.  The proximal interlocking screw was inserted over the guide pin and appropriate placement confirmed with C-arm.  The locking mechanism was engaged.  Compression of the fracture was then achieved using the screw.  The nail jig was removed.      One distal locking screw was placed using the guide in the nail jig.   The nail jig was removed at this point.  Traction was removed.  Final fluoroscopic images were obtained to confirm appropriate reduction of the fracture as well as appropriate placement of the nail.     The incisions were then copiously irrigated and closed with #1 Vicryl, 2-0 Vicryl, and staples. Mepilex was used for the dressing.  The patient was awakened from anesthesia without complications.     PLAN: The patient will be WBAT on the operative lower extremity. Antibiotics for 24 hours post-op followed by 5 days of PO antibiotics. DVT prophylaxis per the primary team.       COMPLICATIONS:  None    PATIENT DISPOSITION:  PACU     I was present for the entire procedure.     NOTE:  No qualified resident or physician assistant was available for the  case.    SIGNATURE:  Rigo Prieto MD  DATE:  December 6, 2024  TIME:  6:43 PM

## 2024-12-06 NOTE — PROGRESS NOTES
Progress Note - Hospitalist   Name: Lona Cedeno 79 y.o. female I MRN: [id number]  Unit/Bed#: -01 I Date of Admission: 12/4/2024   Date of Service: 12/6/2024 I Hospital Day: 2    Assessment & Plan  Closed left hip fracture, initial encounter ([facility])  The patient presented after a fall from standing at home this morning.  She reports that she lost her balance and landed on her left side.  Imaging shows comminuted nondisplaced intertrochanteric fracture left proximal femur   Trauma work-up is negative in ED except left hip fracture.   CK normalized with IV fluids   Orthopedics input appreciated. Plan for surgical intervention on 12/6   Continue to hold aspirin and Xarelto.  Last dose Xarelto 2.5 mg was in the evening of 12/3.  Pain management  PT/OT evaluation once cleared by orthopedics  DVT prophylaxis-SCDs for now  Type 2 diabetes mellitus with diabetic polyneuropathy, without long-term current use of insulin ([facility])  Lab Results   Component Value Date    HGBA1C 7.4 (H) 10/24/2024       Recent Labs     12/05/24  1629 12/05/24  2110 12/06/24  0643 12/06/24  1050   POCGLU 156* 131 142* 139       Blood Sugar Average: Last 72 hrs:  (P) 142  Continue to hold metformin and Januvia from home  SSI  Diabetic diet  Continue Lyrica for polyneuropathy.     Essential hypertension  BP is relatively controlled. Some hypotension noted 12/5 possible related to pain medications.   Will hold off amiloride and lisinopril for now   Monitor BP closely and re-initiate medications as appropriate  Anxiety and depression  Continue with Wellbutrin, clonazepam and Lyrica   PAD (peripheral artery disease) ([facility])  On aspirin 81 mg daily, Xarelto 2.5 mg twice daily and statin   Continue to hold ASA and xarelto for now pending orthopedics surgery later today  Continue statin    COPD, severity to be determined ([facility])  In no acute exacerbation  Continue with Breo and as needed albuterol   Hyperkalemia  Continue to hold amiloride for now  Low  potassium diet  Resolved- continue to monitor BMP   Hyponatremia  Likely hyponatremia hypovolemia given that she was on the floor for a while with decreased p.o. intake  Received gentle IV fluids   Resolved- Monitor BMP   Acute respiratory insufficiency  Noted SpO2 of 86% on room air in the ED.   The patient chronically wears 2 L nasal cannula intermittently at home   Cxr (12/4)- no acute cardiopulmonary finding  Noted increase O2 requirement to 4L NC, now down to 3 L today  Repeat cxr negaitve; possibly related to atelectasis  Continue with oxygen supplement  Monitor to keep SpO2 greater than 88%.  Incentive spirometry, cough and deep breathing   Preoperative clearance  Risk Factor Score (Yes=1, No=0)   Hx of TIA/CVA 0   Hx of prior ischemic heart disease (AMI, unstable angina, Q waves on EKG, CABG) 1   Hx of congestive heart failure 0   Serum Creatinine >2 mg/dl 0   Insulin dependent diabetes mellitus 0   Total Score 1     Risk of MACE (30-day)     Points Risk % (95% CI), Dane, 2017 Risk % (95% CI) Winston, 1999   0 3.9 (2.8-5.4) 0.4 (0.05-1.5)   1 6 (4.9-7.4) 0.9 (0.3-2.1)   2 10.1 (8.1-12.6) 6.6 (3.9-10.3)   3 or more 15 (11.1-20) >11 (5.8-18.4)       Advice    In patients with elevated risk (RCRI >/= 2), assess functional capacity (METs/DASI).   If >4 METs functional capacity, may proceed to surgery. If unknown/poor (<4 METs) functional capacity consider, may need preoperative cardiac testing depending on symptoms and if testing will .      Echocardiogram 8/14/2023-LVEF of 50 to 55%.    SIRS (systemic inflammatory response syndrome) (HCC)  Noted to have WBC of 16.73, HR 91, and tachypnea   Suspect that SIRS criteria is likely due to recent fall with left hip fracture. No acute sign of infection at this time.   UA- negative   Will monitor off antibiotic  SIRS parameters resolved  Urinary retention  Likely due to acute left hip fracture and nonambulatory status  UA- negative   Will hold oxybutynin  for now  Continue with urinary retention protocol    VTE Pharmacologic Prophylaxis: VTE Score: 9 High Risk (Score >/= 5) - Pharmacological DVT Prophylaxis Contraindicated. Sequential Compression Devices Ordered.    Mobility:   Basic Mobility Inpatient Raw Score: 8  JH-HLM Goal: 3: Sit at edge of bed  JH-HLM Achieved: 2: Bed activities/Dependent transfer  JH-HLM Goal NOT achieved. Continue with multidisciplinary rounding and encourage appropriate mobility to improve upon JH-HLM goals.    Patient Centered Rounds: I performed bedside rounds with nursing staff today.   Discussions with Specialists or Other Care Team Provider: nursing, CM    Education and Discussions with Family / Patient: Patient declined call to .     Current Length of Stay: 2 day(s)  Current Patient Status: Inpatient   Certification Statement: The patient will continue to require additional inpatient hospital stay due to planned OR today, then PT/OT evaluation   Discharge Plan: Anticipate discharge in 24-48 hrs to discharge location to be determined pending rehab evaluations.    Code Status: Level 3 - DNAR and DNI    Subjective   The patient was seen and examined. The patient is lying in bed in no acute distress. She continues to have left hip pain. She denies any additional complaints.     Objective :  Temp:  [97.9 °F (36.6 °C)-98.5 °F (36.9 °C)] 98.1 °F (36.7 °C)  HR:  [84-90] 84  BP: ()/(47-84) 131/56  Resp:  [16-18] 18  SpO2:  [78 %-93 %] 90 %  O2 Device: Nasal cannula  Nasal Cannula O2 Flow Rate (L/min):  [3 L/min] 3 L/min    Body mass index is 31.82 kg/m².     Input and Output Summary (last 24 hours):     Intake/Output Summary (Last 24 hours) at 12/6/2024 1216  Last data filed at 12/6/2024 0839  Gross per 24 hour   Intake 460 ml   Output 1834 ml   Net -1374 ml       Physical Exam  Vitals and nursing note reviewed.   Constitutional:       General: She is awake.      Appearance: Normal appearance.   HENT:      Head:  Normocephalic and atraumatic.   Cardiovascular:      Rate and Rhythm: Normal rate and regular rhythm.      Heart sounds: Normal heart sounds.   Pulmonary:      Effort: Pulmonary effort is normal.      Breath sounds: Normal breath sounds.   Abdominal:      Palpations: Abdomen is soft.      Tenderness: There is no abdominal tenderness.   Musculoskeletal:      Comments: Left hip pain, patient able to wiggle toes, foot warm   Skin:     General: Skin is warm and dry.   Neurological:      General: No focal deficit present.      Mental Status: She is alert and oriented to person, place, and time.   Psychiatric:         Attention and Perception: Attention normal.         Mood and Affect: Mood normal.         Speech: Speech normal.         Behavior: Behavior is cooperative.           Lines/Drains:  Lines/Drains/Airways       Active Status       Name Placement date Placement time Site Days    External Urinary Catheter 12/05/24  2100  -- less than 1                            Lab Results: I have reviewed the following results:   Results from last 7 days   Lab Units 12/06/24  0508 12/05/24  0531 12/04/24  1457   WBC Thousand/uL 9.46   < > 16.73*   HEMOGLOBIN g/dL 13.1   < > 13.0   HEMATOCRIT % 41.7   < > 41.1   PLATELETS Thousands/uL 254   < > 260   SEGS PCT %  --   --  86*   LYMPHO PCT %  --   --  5*   MONO PCT %  --   --  7   EOS PCT %  --   --  1    < > = values in this interval not displayed.     Results from last 7 days   Lab Units 12/06/24  0508 12/04/24  1748 12/04/24  1457   SODIUM mmol/L 136   < > 131*   POTASSIUM mmol/L 4.5   < > 5.6*   CHLORIDE mmol/L 102   < > 98   CO2 mmol/L 30   < > 27   BUN mg/dL 9   < > 18   CREATININE mg/dL 0.55*   < > 0.73   ANION GAP mmol/L 4   < > 6   CALCIUM mg/dL 9.5   < > 10.2   ALBUMIN g/dL  --   --  4.4   TOTAL BILIRUBIN mg/dL  --   --  0.65   ALK PHOS U/L  --   --  60   ALT U/L  --   --  16   AST U/L  --   --  31   GLUCOSE RANDOM mg/dL 133   < > 147*    < > = values in this interval  not displayed.     Results from last 7 days   Lab Units 12/04/24  1457   INR  1.14     Results from last 7 days   Lab Units 12/06/24  1050 12/06/24  0643 12/05/24  2110 12/05/24  1629 12/05/24  1141 12/05/24  0736 12/04/24  2113 12/04/24  1746   POC GLUCOSE mg/dl 139 142* 131 156* 111 125 203* 129               Recent Cultures (last 7 days):         Imaging Results Review: No pertinent imaging studies reviewed.  Other Study Results Review: No additional pertinent studies reviewed.    Last 24 Hours Medication List:     Current Facility-Administered Medications:     acetaminophen (TYLENOL) tablet 975 mg, Q8H JILLIAN    albuterol (PROVENTIL HFA,VENTOLIN HFA) inhaler 2 puff, Q6H PRN    ammonium lactate (LAC-HYDRIN) 12 % lotion, Daily    [Held by provider] aspirin (ECOTRIN LOW STRENGTH) EC tablet 81 mg, Daily    atorvastatin (LIPITOR) tablet 10 mg, Daily With Dinner    buPROPion (WELLBUTRIN XL) 24 hr tablet 300 mg, Daily    chlorhexidine gluconate (HIBICLENS) 4 % topical liquid, Daily PRN    clonazePAM (KlonoPIN) tablet 1 mg, QPM    cyanocobalamin (VITAMIN B-12) tablet 1,000 mcg, Daily    docusate sodium (COLACE) capsule 100 mg, BID    Fluticasone Furoate-Vilanterol 100-25 mcg/actuation 1 puff, Daily    insulin lispro (HumALOG/ADMELOG) 100 units/mL subcutaneous injection 1-6 Units, TID AC **AND** Fingerstick Glucose (POCT), TID AC    insulin lispro (HumALOG/ADMELOG) 100 units/mL subcutaneous injection 1-6 Units, HS    magnesium sulfate 2 g/50 mL IVPB (premix) 2 g, Once, Last Rate: 2 g (12/06/24 1028)    morphine injection 2 mg, Q4H PRN    nicotine (NICODERM CQ) 21 mg/24 hr TD 24 hr patch 1 patch, Daily    ondansetron (ZOFRAN) injection 4 mg, Q6H PRN    oxyCODONE (ROXICODONE) immediate release tablet 10 mg, Q4H PRN    oxyCODONE (ROXICODONE) IR tablet 5 mg, Q4H PRN    pantoprazole (PROTONIX) EC tablet 40 mg, Daily    polyethylene glycol (MIRALAX) packet 17 g, Daily    pregabalin (LYRICA) capsule 200 mg, TID    Administrative  Statements   Today, Patient Was Seen By: Nate Burns PA-C  I have spent a total time of 35 minutes in caring for this patient on the day of the visit/encounter including Documenting in the medical record, Reviewing / ordering tests, medicine, procedures  , Obtaining or reviewing history  , and Communicating with other healthcare professionals .    **Please Note: This note may have been constructed using a voice recognition system.**

## 2024-12-07 PROBLEM — R94.31 ABNORMAL EKG: Status: ACTIVE | Noted: 2024-12-07

## 2024-12-07 LAB
ANION GAP SERPL CALCULATED.3IONS-SCNC: 3 MMOL/L (ref 4–13)
BUN SERPL-MCNC: 13 MG/DL (ref 5–25)
CALCIUM SERPL-MCNC: 9.1 MG/DL (ref 8.4–10.2)
CHLORIDE SERPL-SCNC: 100 MMOL/L (ref 96–108)
CO2 SERPL-SCNC: 31 MMOL/L (ref 21–32)
CREAT SERPL-MCNC: 0.56 MG/DL (ref 0.6–1.3)
ERYTHROCYTE [DISTWIDTH] IN BLOOD BY AUTOMATED COUNT: 14.2 % (ref 11.6–15.1)
GFR SERPL CREATININE-BSD FRML MDRD: 89 ML/MIN/1.73SQ M
GLUCOSE SERPL-MCNC: 158 MG/DL (ref 65–140)
GLUCOSE SERPL-MCNC: 167 MG/DL (ref 65–140)
GLUCOSE SERPL-MCNC: 181 MG/DL (ref 65–140)
GLUCOSE SERPL-MCNC: 194 MG/DL (ref 65–140)
GLUCOSE SERPL-MCNC: 230 MG/DL (ref 65–140)
HCT VFR BLD AUTO: 36.2 % (ref 34.8–46.1)
HGB BLD-MCNC: 11.3 G/DL (ref 11.5–15.4)
MAGNESIUM SERPL-MCNC: 2 MG/DL (ref 1.9–2.7)
MCH RBC QN AUTO: 29.6 PG (ref 26.8–34.3)
MCHC RBC AUTO-ENTMCNC: 31.2 G/DL (ref 31.4–37.4)
MCV RBC AUTO: 95 FL (ref 82–98)
PLATELET # BLD AUTO: 263 THOUSANDS/UL (ref 149–390)
PMV BLD AUTO: 9.9 FL (ref 8.9–12.7)
POTASSIUM SERPL-SCNC: 5.2 MMOL/L (ref 3.5–5.3)
RBC # BLD AUTO: 3.82 MILLION/UL (ref 3.81–5.12)
SODIUM SERPL-SCNC: 134 MMOL/L (ref 135–147)
WBC # BLD AUTO: 9.79 THOUSAND/UL (ref 4.31–10.16)

## 2024-12-07 PROCEDURE — 99024 POSTOP FOLLOW-UP VISIT: CPT | Performed by: PHYSICIAN ASSISTANT

## 2024-12-07 PROCEDURE — 83735 ASSAY OF MAGNESIUM: CPT | Performed by: PHYSICIAN ASSISTANT

## 2024-12-07 PROCEDURE — 85027 COMPLETE CBC AUTOMATED: CPT | Performed by: PHYSICIAN ASSISTANT

## 2024-12-07 PROCEDURE — 99232 SBSQ HOSP IP/OBS MODERATE 35: CPT | Performed by: INTERNAL MEDICINE

## 2024-12-07 PROCEDURE — 97163 PT EVAL HIGH COMPLEX 45 MIN: CPT

## 2024-12-07 PROCEDURE — 82948 REAGENT STRIP/BLOOD GLUCOSE: CPT

## 2024-12-07 PROCEDURE — 97110 THERAPEUTIC EXERCISES: CPT

## 2024-12-07 PROCEDURE — 80048 BASIC METABOLIC PNL TOTAL CA: CPT | Performed by: PHYSICIAN ASSISTANT

## 2024-12-07 RX ORDER — ENOXAPARIN SODIUM 100 MG/ML
40 INJECTION SUBCUTANEOUS DAILY
Status: DISCONTINUED | OUTPATIENT
Start: 2024-12-07 | End: 2024-12-07

## 2024-12-07 RX ORDER — CEFAZOLIN SODIUM 1 G/50ML
1000 SOLUTION INTRAVENOUS EVERY 8 HOURS
Status: COMPLETED | OUTPATIENT
Start: 2024-12-07 | End: 2024-12-07

## 2024-12-07 RX ADMIN — ACETAMINOPHEN 975 MG: 325 TABLET ORAL at 06:18

## 2024-12-07 RX ADMIN — DOCUSATE SODIUM 100 MG: 100 CAPSULE, LIQUID FILLED ORAL at 09:12

## 2024-12-07 RX ADMIN — ATORVASTATIN CALCIUM 10 MG: 10 TABLET, FILM COATED ORAL at 16:58

## 2024-12-07 RX ADMIN — OXYCODONE HYDROCHLORIDE 10 MG: 10 TABLET ORAL at 19:19

## 2024-12-07 RX ADMIN — DOCUSATE SODIUM 100 MG: 100 CAPSULE, LIQUID FILLED ORAL at 17:01

## 2024-12-07 RX ADMIN — INSULIN LISPRO 1 UNITS: 100 INJECTION, SOLUTION INTRAVENOUS; SUBCUTANEOUS at 17:01

## 2024-12-07 RX ADMIN — ACETAMINOPHEN 975 MG: 325 TABLET ORAL at 14:20

## 2024-12-07 RX ADMIN — RIVAROXABAN 2.5 MG: 2.5 TABLET, FILM COATED ORAL at 17:04

## 2024-12-07 RX ADMIN — INSULIN LISPRO 3 UNITS: 100 INJECTION, SOLUTION INTRAVENOUS; SUBCUTANEOUS at 21:09

## 2024-12-07 RX ADMIN — MORPHINE SULFATE 2 MG: 2 INJECTION, SOLUTION INTRAMUSCULAR; INTRAVENOUS at 20:28

## 2024-12-07 RX ADMIN — OXYCODONE HYDROCHLORIDE 5 MG: 5 TABLET ORAL at 09:13

## 2024-12-07 RX ADMIN — Medication: at 09:13

## 2024-12-07 RX ADMIN — PREGABALIN 200 MG: 100 CAPSULE ORAL at 21:09

## 2024-12-07 RX ADMIN — PANTOPRAZOLE SODIUM 40 MG: 40 TABLET, DELAYED RELEASE ORAL at 09:12

## 2024-12-07 RX ADMIN — FLUTICASONE FUROATE AND VILANTEROL TRIFENATATE 1 PUFF: 100; 25 POWDER RESPIRATORY (INHALATION) at 10:49

## 2024-12-07 RX ADMIN — PREGABALIN 200 MG: 100 CAPSULE ORAL at 09:12

## 2024-12-07 RX ADMIN — CYANOCOBALAMIN TAB 500 MCG 1000 MCG: 500 TAB at 09:13

## 2024-12-07 RX ADMIN — INSULIN LISPRO 2 UNITS: 100 INJECTION, SOLUTION INTRAVENOUS; SUBCUTANEOUS at 11:49

## 2024-12-07 RX ADMIN — INSULIN LISPRO 1 UNITS: 100 INJECTION, SOLUTION INTRAVENOUS; SUBCUTANEOUS at 08:06

## 2024-12-07 RX ADMIN — NICOTINE 1 PATCH: 21 PATCH, EXTENDED RELEASE TRANSDERMAL at 09:25

## 2024-12-07 RX ADMIN — ASPIRIN 81 MG: 81 TABLET, COATED ORAL at 09:12

## 2024-12-07 RX ADMIN — CEFADROXIL 1000 MG: 500 CAPSULE ORAL at 21:09

## 2024-12-07 RX ADMIN — ACETAMINOPHEN 975 MG: 325 TABLET ORAL at 21:09

## 2024-12-07 RX ADMIN — CLONAZEPAM 1 MG: 1 TABLET ORAL at 17:01

## 2024-12-07 RX ADMIN — POLYETHYLENE GLYCOL 3350 17 G: 17 POWDER, FOR SOLUTION ORAL at 09:12

## 2024-12-07 RX ADMIN — BUPROPION HYDROCHLORIDE 300 MG: 150 TABLET, EXTENDED RELEASE ORAL at 09:13

## 2024-12-07 RX ADMIN — CEFAZOLIN SODIUM 1000 MG: 1 SOLUTION INTRAVENOUS at 16:58

## 2024-12-07 RX ADMIN — ENOXAPARIN SODIUM 40 MG: 40 INJECTION SUBCUTANEOUS at 09:12

## 2024-12-07 RX ADMIN — CEFAZOLIN SODIUM 1000 MG: 1 SOLUTION INTRAVENOUS at 09:11

## 2024-12-07 RX ADMIN — PREGABALIN 200 MG: 100 CAPSULE ORAL at 16:58

## 2024-12-07 NOTE — ASSESSMENT & PLAN NOTE
BP is relatively controlled. Some hypotension noted 12/5 possible related to pain medications.   Continue to hold amiloride and lisinopril for now   Monitor BP closely and re-initiate medications as appropriate

## 2024-12-07 NOTE — ASSESSMENT & PLAN NOTE
Patient is POD1 s/p left hip IMN    - WBAT  - Abx: ancef x24 hrs, then duricef 500 mg bid for 5 days   -Unfortunately, the patient did not receive her antibiotics in timely manner, secondary to an issue with the med rec.  She is now receiving the ABX at this morning.  - DVT ppx: can restart home eliquis and ASA today  - PT/OT  - Pain control per primary team  - Medical management per primary team  - Case management consult for dispo planning

## 2024-12-07 NOTE — PLAN OF CARE
Problem: Potential for Falls  Goal: Patient will remain free of falls  Description: INTERVENTIONS:  - Educate patient/family on patient safety including physical limitations  - Instruct patient to call for assistance with activity   - Consult OT/PT to assist with strengthening/mobility   - Keep Call bell within reach  - Keep bed low and locked with side rails adjusted as appropriate  - Keep care items and personal belongings within reach  - Initiate and maintain comfort rounds  - Make Fall Risk Sign visible to staff  - Offer Toileting every 2 Hours, in advance of need  - Initiate/Maintain bedalarm  - Obtain necessary fall risk management equipment: bed alarm  - Apply yellow socks and bracelet for high fall risk patients  - Consider moving patient to room near nurses station  Outcome: Progressing     Problem: PAIN - ADULT  Goal: Verbalizes/displays adequate comfort level or baseline comfort level  Description: Interventions:  - Encourage patient to monitor pain and request assistance  - Assess pain using appropriate pain scale  - Administer analgesics based on type and severity of pain and evaluate response  - Implement non-pharmacological measures as appropriate and evaluate response  - Consider cultural and social influences on pain and pain management  - Notify physician/advanced practitioner if interventions unsuccessful or patient reports new pain  Outcome: Progressing

## 2024-12-07 NOTE — PHYSICAL THERAPY NOTE
Physical Therapy Evaluation     Patient's Name: Lona Cedeno    Admitting Diagnosis  Closed fracture of left hip, initial encounter (Formerly [facility]) [S72.002A]  Closed left hip fracture, initial encounter (Formerly [facility]) [S72.002A]    Problem List  Patient Active Problem List   Diagnosis    Type 2 diabetes mellitus with diabetic polyneuropathy, without long-term current use of insulin (Formerly [facility])    Essential hypertension    Anxiety and depression    GERD without esophagitis    Urinary incontinence    Degenerative lumbar disc    Hypomagnesemia    Bladder neoplasm    Left renal mass    PAD (peripheral artery disease) (Formerly [facility])    Abnormal CT of the chest    COPD, severity to be determined (Formerly [facility])    Tobacco use disorder    Age-related osteoporosis without current pathological fracture    Constipation    Anemia    At risk for venous thromboembolism (VTE)    At high risk for skin breakdown    Wound of skin    Impaired mobility and activities of daily living    Leukocytosis    Other dietary vitamin B12 deficiency anemia    Sacral wound    Acute respiratory failure with hypoxia (Formerly [facility])    Toxic encephalopathy    Open wound of right foot    Hyperkalemia    Acute metabolic encephalopathy    Hyponatremia    Acute encephalopathy    Closed left hip fracture, initial encounter ([facility]ly [facility])    Acute respiratory insufficiency    Preoperative clearance    SIRS (systemic inflammatory response syndrome) ([facility]ly [facility])    Urinary retention       Past Medical History  Past Medical History:   Diagnosis Date    Anxiety     Closed fracture of coccyx ([facility]ly [facility]) 05/24/2023    COPD (chronic obstructive pulmonary disease) (Formerly [facility])     Diabetes (Formerly [facility])     Diabetes mellitus (HCC)     GERD (gastroesophageal reflux disease)     Hyperlipidemia     Hypertension     IBS (irritable bowel syndrome)     Peripheral arterial disease ([facility])     Shoulder fracture     Tobacco use        Past Surgical History  Past Surgical History:   Procedure Laterality Date    ANKLE FRACTURE SURGERY Right     has screw in place      SECTION      x2    CHOLECYSTECTOMY      CYSTOSCOPY W/ LASER LITHOTRIPSY Left 2023    Procedure: CYSTOSCOPY; RETROGRADE; URETEROSCOPY; BIOPSY; LEFT -INSERTION OF STENT;  Surgeon: Cristian Child MD;  Location: [facility];  Service: Urology    CYSTOSCOPY W/ LASER LITHOTRIPSY Left 2023    Procedure: CYSTOSCOPY; RETROGRADE; URETEROSCOPY; 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;  Surgeon: Cristian Child MD;  Location: [facility];  Service: Urology    EVACUATION OF HEMATOMA Right 2024    Procedure: EVACUATION/ DRAINAGE CHEST WALL  HEMATOMA;  Surgeon: Seth Hoyos MD;  [facility]: [facility];  Service: Vascular    FL RETROGRADE PYELOGRAM  2023    HERNIA REPAIR      umbilicus w/ mesh    MA BYPASS W/VEIN AXILLARY-FEMORAL Right 2024    Procedure: BYPASS AXILLO-FEMORAL, RIGHT WITH 8MM RINGED PTFE GRAFT;  Surgeon: Seth Hoyos MD;  [facility];  Service: Vascular    SPLIT THICKNESS SKIN GRAFT Right 2024    Procedure: SKIN GRAFT SPLIT THICKNESS (STSG)  EXTREMITY, Wound vac dressing change;  Surgeon: Rigo Yeboah DPM;  [facility];  Service: Podiatry    WOUND DEBRIDEMENT Right 2024    Procedure: RIGHT WOUND AND ACHILLES DEBRIDEMENT FOOT/TOE (WASH OUT); PARTIAL AMPUTATION DISTAL 2ND TOE;  Surgeon: Aaron Montero DPM;  [facility];  Service: Podiatry    WOUND DEBRIDEMENT Right 2024    Procedure: DEBRIDEMENT RIGHT GROIN  WOUND AND WASHOUT, APPLICATION OF WOUND VAC;  Surgeon: Suly Muro DO;  [facility];  Service: Vascular    WOUND DEBRIDEMENT Right 2024    Procedure: DEBRIDEMENT LOWER EXTREMITY, washout;  Surgeon: Suly Muro DO;  [facility];  Service: Vascular        24 0935   PT Last Visit   PT Visit Date 24   Note Type   Note type Evaluation   Pain Assessment   Pain Assessment Tool 0-10   Pain Score 4   Pain Location/Orientation Orientation: Left;Location:  Hip   Pain Onset/Description Onset: Ongoing  (increased with mobility)   Restrictions/Precautions   Weight Bearing Precautions Per Order Yes   LLE Weight Bearing Per Order WBAT   Other Precautions Bed Alarm;Chair Alarm;Fall Risk;Pain;Hard of hearing   Home Living   Type of Home House   Home Layout One level;Stairs to enter with rails;Performs ADLs on one level  (2 PATO)   Home Equipment Cane;Wheelchair-manual;Walker   Additional Comments w/c in community, ambulates with cane   Prior Function   Level of [place] Independent with ADLs;Independent with functional mobility;Needs assistance with IADLS   Lives With Spouse  (on disability)   Receives Help From Family   IADLs Independent with driving;Independent with meal prep;Independent with medication management   Falls in the last 6 months 1 to 4   Vocational Retired   General   Family/Caregiver Present No   Cognition   Overall Cognitive Status WFL   Arousal/Participation Alert   Orientation Level Oriented X4   Memory Within functional limits   Following Commands Follows all commands and directions without difficulty   RLE Assessment   RLE Assessment WFL   LLE Assessment   LLE Assessment   (DNT formally MMT, observed at least 3+/5 with functional mobility)   Coordination   Movements are Fluid and Coordinated 1   Sensation WFL   Bed Mobility   Supine to Sit 4  Minimal assistance   Additional items Assist x 2;HOB elevated;Bedrails;Increased time required;Verbal cues;LE management   Transfers   Sit to Stand 4  Minimal assistance   Additional items Assist x 2;Armrests;Increased time required;Verbal cues   Stand to Sit 4  Minimal assistance   Additional items Assist x 2;Armrests;Increased time required;Verbal cues   Additional Comments vc for appropriate hand/foot placement   Ambulation/Elevation   Gait pattern Decreased foot clearance;Decreased L stance;Antalgic;Shuffling;Excessively slow;Short stride;Decreased heel strike;Decreased toe off;Decreased hip extension   Gait  Assistance 4  Minimal assist   Additional items Assist x 2;Verbal cues;Tactile cues   Assistive Device Rolling walker   Distance 1'   Ambulation/Elevation Additional Comments bed ch air swap performed for enhanced pt safety with OOB transfer   Balance   Static Sitting Fair +   Dynamic Sitting Fair   Static Standing Poor +   Dynamic Standing Poor   Ambulatory Poor   Endurance Deficit   Endurance Deficit Yes   Activity Tolerance   Activity Tolerance Patient limited by pain;Patient limited by fatigue   Medical Staff Made Aware CATRACHITO Caruso   Nurse Made Aware RN Jannette   Assessment   Prognosis Good   Problem List Decreased strength;Decreased endurance;Impaired balance;Decreased mobility;Decreased range of motion;Orthopedic restrictions;Pain   Assessment Pt is 79 y.o. female seen for PT evaluation on 12/7/2024 s/p admit to [facility] on 12/4/2024 w/ Closed left hip fracture, initial encounter ([facility] POD 1 L hip IM nail. PT was consulted to assess pt's functional mobility and d/c needs. Order placed for PT eval and tx. PTA, pt resides with spouse in 1SH with 2 PATO, ambulates with cane. At time of eval, pt requiring min assist x2 for all phases of mobility. Upon evaluation, pt presenting with impaired functional mobility d/t decreased strength, decreased ROM, decreased endurance, impaired balance, decreased mobility, orthopedic restrictions of WBAT LLE, pain, and activity intolerance. Pertinent PMHx and current co-morbidities affecting pt's physical performance at time of assessment include: type 2 DM< HTN, anxiety and depression, PAD, COPD, SIRS, urinary retention, bladder neoplasm, age related osteoporosis, anemia, leukocytosis, toxic encephalopathy. Personal factors affecting pt at time of eval include: inability to ambulate household distances, inability to navigate level surfaces w/o external assistance, and positive fall history. The following objective measures performed on IE also reveal limitations: Barthel  Index: 45/100, Modified [place]: 4 (moderate/severe disability), and -PAC 6-Clicks: 11/24. Pt's clinical presentation is currently unstable/unpredictable seen in pt's presentation of abnormal lab value(s), need for input for task focus and mobility technique, L hip pain impacting overall mobility status, and ongoing medical assessment. Overall, pt's rehab potential and prognosis to return to PLOF is good as impacted by objective findings, warranting pt to receive further skilled PT interventions to address identified impairments, activity limitation(s), and participation restriction(s). Pt to benefit from continued PT tx to address deficits as defined above and maximize level of functional independent mobility. From PT/mobility standpoint, recommend level 1, maximum resource intensity in order to facilitate return to PLOF.   Barriers to Discharge Inaccessible home environment;Decreased caregiver support   Goals   Patient Goals to go home   STG Expiration Date 12/17/24   Short Term Goal #1 In 7-10 days: Increase bilateral LE strength 1/2 grade to facilitate independent mobility, Perform all bed mobility tasks modified independent to decrease caregiver burden, Perform all transfers modified independent to improve independence, Ambulate > 50 ft. with RW with SBA w/o LOB and w/ normalized gait pattern 100% of the time, Navigate 2 stair(s) with CGA with unilateral handrail to facilitate return to previous living environment, and Increase all balance 1/2 grade to decrease risk for falls   PT Treatment Day 1   Plan   Treatment/Interventions Functional transfer training;Elevations;LE strengthening/ROM;Therapeutic exercise;Endurance training;Patient/family training;Equipment eval/education;Gait training;Bed mobility;Spoke to nursing   PT Frequency 4-6x/wk   Discharge Recommendation   Rehab Resource Intensity Level, PT I (Maximum Resource Intensity)   Equipment Recommended Walker  (RW)   AM-PAC Basic Mobility Inpatient    Turning in Flat Bed Without Bedrails 3   Lying on Back to Sitting on Edge of Flat Bed Without Bedrails 2   Moving Bed to Chair 2   Standing Up From Chair Using Arms 2   Walk in Room 1   Climb 3-5 Stairs With Railing 1   Basic Mobility Inpatient Raw Score 11   Basic Mobility Standardized Score 30.25   [facility] Highest Level Of Mobility   [facility] Goal 4: Move to chair/commode   -[facility] Achieved 5: Stand (1 or more minutes)   Modified [place] Scale   Modified [place] Scale 4   Barthel Index   Feeding 10   Bathing 0   Grooming Score 5   Dressing Score 5   Bladder Score 5   Bowels Score 10   Toilet Use Score 5   Transfers (Bed/Chair) Score 5   Mobility (Level Surface) Score 0   Stairs Score 0   Barthel Index Score 45   Additional Treatment Session   Start Time 0950   End Time 1000   Treatment Assessment Pt seen for PT treatment session this date s/p PT eval, consisting of ther ex focused on strengthening. VC for technique. Current goals and POC remain appropriate, pt continues to have rehab potential and is making progress towards STGs. Pt prognosis for achieving goals is good, pending pt progress with hospitalization/medical status improvements, and indicated by Stimulability and ability to follow directions. PT recommends level 1, maximum resource intensity upon discharge. Pt continues to be functioning below baseline level, and remains limited 2* factors listed above. PT will continue to see pt during current hospitalization in order to address the deficits listed above and provide interventions consistent w/ POC in effort to achieve STGs.   Exercises   Quad Sets Sitting;10 reps;AROM;Bilateral   Glute Sets Sitting;10 reps;AROM;Bilateral   Ankle Pumps Sitting;10 reps;AROM;Bilateral   End of Consult   Patient Position at End of Consult Bedside chair;Bed/Chair alarm activated;All needs within reach           Ira Edward, PT

## 2024-12-07 NOTE — ASSESSMENT & PLAN NOTE
The patient presented after a fall from standing at home this morning.  She reports that she lost her balance and landed on her left side.  Imaging shows comminuted nondisplaced intertrochanteric fracture left proximal femur   Trauma work-up is negative in ED except left hip fracture.   CK normalized with IV fluids   Orthopedics input appreciated.   S/p left hip IMN on 12/6/2025  Restart aspirin and Xarelto.  Pain management  PT/OT recommending rehab  CM placed referrals

## 2024-12-07 NOTE — ASSESSMENT & PLAN NOTE
Anesthesiology reported patient had a slightly wider QRS complex postop and recommended telemetry monitoring and Echocardiogram prior to discharge  Continue telemetry  Echo pending

## 2024-12-07 NOTE — NURSING NOTE
Patient's , sinus tach. Denies pain, SOB resolved. Nausea resolved. Midflow at 6L NC. Maintaining oxygen saturations 95-92% on same. Requested telemetry bed.

## 2024-12-07 NOTE — PROGRESS NOTES
Progress Note - Hospitalist   Name: Lona Cedeno 79 y.o. female I MRN: [id number]  Unit/Bed#: MS Silver-01 I Date of Admission: 12/4/2024   Date of Service: 12/7/2024 I Hospital Day: 3    Assessment & Plan  Closed left hip fracture, initial encounter ([facility])  The patient presented after a fall from standing at home this morning.  She reports that she lost her balance and landed on her left side.  Imaging shows comminuted nondisplaced intertrochanteric fracture left proximal femur   Trauma work-up is negative in ED except left hip fracture.   CK normalized with IV fluids   Orthopedics input appreciated.   S/p left hip IMN on 12/6/2025  Restart aspirin and Xarelto.  Pain management  PT/OT recommending rehab  CM placed referrals   Type 2 diabetes mellitus with diabetic polyneuropathy, without long-term current use of insulin ([facility])  Lab Results   Component Value Date    HGBA1C 7.4 (H) 10/24/2024       Recent Labs     12/06/24  1911 12/06/24  2041 12/07/24  0751 12/07/24  1145   POCGLU 176* 255* 158* 194*       Blood Sugar Average: Last 72 hrs:  (P) [id number]  Continue to hold metformin and Januvia from home  SSI  Diabetic diet  Continue Lyrica for polyneuropathy.     Essential hypertension  BP is relatively controlled. Some hypotension noted 12/5 possible related to pain medications.   Continue to hold amiloride and lisinopril for now   Monitor BP closely and re-initiate medications as appropriate  Anxiety and depression  Continue with Wellbutrin, clonazepam and Lyrica   PAD (peripheral artery disease) ([facility])  On aspirin 81 mg daily, Xarelto 2.5 mg twice daily and statin   ASA and xarelto held for surgery- restarted 12/7  Continue statin    COPD, severity to be determined ([facility])  In no acute exacerbation  Continue with Breo and as needed albuterol   Hyperkalemia  Continue to hold amiloride for now  Low potassium diet  Resolved- continue to monitor BMP   Hyponatremia  Likely hyponatremia hypovolemia given that she  was on the floor for a while with decreased p.o. intake  Received gentle IV fluids   Resolved- Monitor BMP   Acute respiratory insufficiency  Noted SpO2 of 86% on room air in the ED.   The patient chronically wears 2 L nasal cannula intermittently at home   Cxr (12/4)- no acute cardiopulmonary finding  Noted increase O2 requirement to 4L NC, now down to 3 L today  Repeat cxr negaitve; possibly related to atelectasis  Continue with oxygen supplement  Monitor to keep SpO2 greater than 88%.  Incentive spirometry, cough and deep breathing   SIRS (systemic inflammatory response syndrome) (HCC)  Noted to have WBC of 16.73, HR 91, and tachypnea   Suspect that SIRS criteria is likely due to recent fall with left hip fracture. No acute sign of infection at this time.   UA- negative   Will monitor off antibiotic  SIRS parameters resolved  Urinary retention  Likely due to acute left hip fracture and nonambulatory status  UA- negative   Will hold oxybutynin for now  Continue with urinary retention protocol  Abnormal EKG  Anesthesiology reported patient had a slightly wider QRS complex postop and recommended telemetry monitoring and Echocardiogram prior to discharge  Continue telemetry  Echo pending     VTE Pharmacologic Prophylaxis: VTE Score: 9 High Risk (Score >/= 5) - Pharmacological DVT Prophylaxis Ordered: rivaroxaban (Xarelto). Sequential Compression Devices Ordered.    Mobility:   Basic Mobility Inpatient Raw Score: 11  JH-HLM Goal: 4: Move to chair/commode  JH-HLM Achieved: 5: Stand (1 or more minutes)  JH-HLM Goal NOT achieved. Continue with multidisciplinary rounding and encourage appropriate mobility to improve upon JH-HLM goals.    Patient Centered Rounds: I performed bedside rounds with nursing staff today.   Discussions with Specialists or Other Care Team Provider: nursing, CM    Education and Discussions with Family / Patient: Patient declined call to .     Current Length of Stay: 3 day(s)  Current  Patient Status: Inpatient   Certification Statement: The patient will continue to require additional inpatient hospital stay due to need for rehab placement  Discharge Plan: Anticipate discharge in 48-72 hrs to rehab facility.    Code Status: Level 3 - DNAR and DNI    Subjective   The patient was seen and examined. The patient is sitting up in her chair in no acute distress. She reports feeling sore after working with PT.     Objective :  Temp:  [97.4 °F (36.3 °C)-100.9 °F (38.3 °C)] 97.5 °F (36.4 °C)  HR:  [] 79  BP: (107-162)/(53-77) 113/63  Resp:  [16-39] 16  SpO2:  [87 %-96 %] 94 %  O2 Device: Mid flow nasal cannula  Nasal Cannula O2 Flow Rate (L/min):  [3 L/min-10 L/min] 6 L/min    Body mass index is 33.57 kg/m².     Input and Output Summary (last 24 hours):     Intake/Output Summary (Last 24 hours) at 12/7/2024 1318  Last data filed at 12/7/2024 1136  Gross per 24 hour   Intake 1450 ml   Output 565 ml   Net 885 ml       Physical Exam  Vitals and nursing note reviewed.   Constitutional:       General: She is awake.      Appearance: Normal appearance.   HENT:      Head: Normocephalic and atraumatic.   Cardiovascular:      Rate and Rhythm: Normal rate and regular rhythm.      Heart sounds: Normal heart sounds.   Pulmonary:      Effort: Pulmonary effort is normal.      Breath sounds: Normal breath sounds.   Abdominal:      Palpations: Abdomen is soft.      Tenderness: There is no abdominal tenderness.   Skin:     General: Skin is warm and dry.   Neurological:      General: No focal deficit present.      Mental Status: She is alert and oriented to person, place, and time.   Psychiatric:         Attention and Perception: Attention normal.         Mood and Affect: Mood normal.         Speech: Speech normal.         Behavior: Behavior is cooperative.           Lines/Drains:  Lines/Drains/Airways       Active Status       Name Placement date Placement time Site Days    External Urinary Catheter 12/05/24 2100  --  1                      Telemetry:  Telemetry Orders (From admission, onward)               24 Hour Telemetry Monitoring  Continuous x 24 Hours (Telem)        Expiring   Question:  Reason for 24 Hour Telemetry  Answer:  Arrhythmias requiring acute medical intervention / PPM or ICD malfunction                     Telemetry Reviewed: Normal Sinus Rhythm  Indication for Continued Telemetry Use: Arrthymias requiring medical therapy               Lab Results: I have reviewed the following results:   Results from last 7 days   Lab Units 12/07/24  0446 12/05/24  0531 12/04/24  1457   WBC Thousand/uL 9.79   < > 16.73*   HEMOGLOBIN g/dL 11.3*   < > 13.0   HEMATOCRIT % 36.2   < > 41.1   PLATELETS Thousands/uL 263   < > 260   SEGS PCT %  --   --  86*   LYMPHO PCT %  --   --  5*   MONO PCT %  --   --  7   EOS PCT %  --   --  1    < > = values in this interval not displayed.     Results from last 7 days   Lab Units 12/07/24  0446 12/04/24  1748 12/04/24  1457   SODIUM mmol/L 134*   < > 131*   POTASSIUM mmol/L 5.2   < > 5.6*   CHLORIDE mmol/L 100   < > 98   CO2 mmol/L 31   < > 27   BUN mg/dL 13   < > 18   CREATININE mg/dL 0.56*   < > 0.73   ANION GAP mmol/L 3*   < > 6   CALCIUM mg/dL 9.1   < > 10.2   ALBUMIN g/dL  --   --  4.4   TOTAL BILIRUBIN mg/dL  --   --  0.65   ALK PHOS U/L  --   --  60   ALT U/L  --   --  16   AST U/L  --   --  31   GLUCOSE RANDOM mg/dL 181*   < > 147*    < > = values in this interval not displayed.     Results from last 7 days   Lab Units 12/04/24  1457   INR  1.14     Results from last 7 days   Lab Units 12/07/24  1145 12/07/24  0751 12/06/24  2041 12/06/24  1911 12/06/24  1317 12/06/24  1305 12/06/24  1050 12/06/24  0643 12/05/24  2110 12/05/24  1629 12/05/24  1141 12/05/24  0736   POC GLUCOSE mg/dl 194* 158* 255* 176* 123 125 139 142* 131 156* 111 125               Recent Cultures (last 7 days):         Imaging Results Review: No pertinent imaging studies reviewed.  Other Study Results Review: No  additional pertinent studies reviewed.    Last 24 Hours Medication List:     Current Facility-Administered Medications:     acetaminophen (TYLENOL) tablet 975 mg, Q8H JILLIAN    albuterol (PROVENTIL HFA,VENTOLIN HFA) inhaler 2 puff, Q6H PRN    ammonium lactate (LAC-HYDRIN) 12 % lotion, Daily    aspirin (ECOTRIN LOW STRENGTH) EC tablet 81 mg, Daily    atorvastatin (LIPITOR) tablet 10 mg, Daily With Dinner    buPROPion (WELLBUTRIN XL) 24 hr tablet 300 mg, Daily    cefadroxil (DURICEF) capsule 1,000 mg, Q12H JILLIAN    ceFAZolin (ANCEF) IVPB (premix in dextrose) 1,000 mg 50 mL, Q8H, Last Rate: 1,000 mg (12/07/24 0911)    clonazePAM (KlonoPIN) tablet 1 mg, QPM    cyanocobalamin (VITAMIN B-12) tablet 1,000 mcg, Daily    docusate sodium (COLACE) capsule 100 mg, BID    Fluticasone Furoate-Vilanterol 100-25 mcg/actuation 1 puff, Daily    insulin lispro (HumALOG/ADMELOG) 100 units/mL subcutaneous injection 1-6 Units, TID AC **AND** Fingerstick Glucose (POCT), TID AC    insulin lispro (HumALOG/ADMELOG) 100 units/mL subcutaneous injection 1-6 Units, HS    lactated ringers bolus 1,000 mL, Once PRN **AND** lactated ringers bolus 1,000 mL, Once PRN    lactated ringers infusion, Continuous, Last Rate: 20 mL/hr (12/06/24 2008)    morphine injection 2 mg, Q4H PRN    nicotine (NICODERM CQ) 21 mg/24 hr TD 24 hr patch 1 patch, Daily    ondansetron (ZOFRAN) injection 4 mg, Q6H PRN    oxyCODONE (ROXICODONE) immediate release tablet 10 mg, Q4H PRN    oxyCODONE (ROXICODONE) IR tablet 5 mg, Q4H PRN    pantoprazole (PROTONIX) EC tablet 40 mg, Daily    polyethylene glycol (MIRALAX) packet 17 g, Daily    pregabalin (LYRICA) capsule 200 mg, TID    rivaroxaban (XARELTO) tablet 2.5 mg, BID With Meals    sodium chloride 0.9 % bolus 1,000 mL, Once PRN **AND** sodium chloride 0.9 % bolus 1,000 mL, Once PRN    Administrative Statements   Today, Patient Was Seen By: Nate Burns PA-C  I have spent a total time of 35 minutes in caring for this patient on  the day of the visit/encounter including Documenting in the medical record, Reviewing / ordering tests, medicine, procedures  , and Obtaining or reviewing history  .    **Please Note: This note may have been constructed using a voice recognition system.**

## 2024-12-07 NOTE — PROGRESS NOTES
Progress Note - Orthopedics   Name: Lona Cedeno 79 y.o. female I MRN: [id number]  Unit/Bed#: -01 I Date of Admission: 12/4/2024   Date of Service: 12/7/2024 I Hospital Day: 3     Assessment & Plan  Closed left hip fracture, initial encounter ([facility])    Patient is POD1 s/p left hip IMN    - WBAT  - Abx: ancef x24 hrs, then duricef 500 mg bid for 5 days   -Unfortunately, the patient did not receive her antibiotics in timely manner, secondary to an issue with the med rec.  She is now receiving the ABX at this morning.  - DVT ppx: can restart home eliquis and ASA today  - PT/OT  - Pain control per primary team  - Medical management per primary team  - Case management consult for dispo planning    Type 2 diabetes mellitus with diabetic polyneuropathy, without long-term current use of insulin ([facility])  Lab Results   Component Value Date    HGBA1C 7.4 (H) 10/24/2024       Recent Labs     12/06/24  1317 12/06/24  1911 12/06/24  2041 12/07/24  0751   POCGLU 123 176* 255* 158*       Blood Sugar Average: Last 72 hrs:  (P) 151.9238266125240335    Essential hypertension    Anxiety and depression    PAD (peripheral artery disease) ([facility])    COPD, severity to be determined ([facility])    Hyperkalemia    Hyponatremia    Acute respiratory insufficiency    Preoperative clearance    SIRS (systemic inflammatory response syndrome) ([facility])    Urinary retention        Subjective   79 y.o.female who is now resting comfortably in the chair.  She has some complaints of left hip and groin pain.  She denies any numbness or tingling.  She denies any fever or chills.  She was able to ambulate to the chair this morning with therapy.    Objective :  Temp:  [97.4 °F (36.3 °C)-100.9 °F (38.3 °C)] 97.5 °F (36.4 °C)  HR:  [] 73  BP: (107-162)/(53-77) 107/53  Resp:  [16-39] 16  SpO2:  [87 %-96 %] 93 %  O2 Device: Mid flow nasal cannula  Nasal Cannula O2 Flow Rate (L/min):  [3 L/min-10 L/min] 6 L/min    Physical Exam  Left lower extremity is  neurovascularly intact  Toes are pink and mobile  Compartments are soft  Dressing is clean, dry and intact  Brisk cap refill  Sensation intact  Ace wrap in place  Good dorsiflexion and plantarflexion of ankle      Lab Results: I have reviewed the following results:  Recent Labs     12/04/24  1457 12/04/24  1748 12/05/24  0531 12/06/24  0508 12/07/24  0446   WBC 16.73*  --  10.74* 9.46 9.79   HGB 13.0  --  11.8 13.1 11.3*   HCT 41.1  --  37.4 41.7 36.2     --  201 254 263   BUN 18   < > 14 9 13   CREATININE 0.73   < > 0.53* 0.55* 0.56*   PTT 29  --   --   --   --    INR 1.14  --   --   --   --     < > = values in this interval not displayed.     Blood Culture:    Lab Results   Component Value Date    BLOODCX No Growth After 5 Days. 10/23/2024     Wound Culture:   Lab Results   Component Value Date    WOUNDCULT 1+ Growth of Klebsiella pneumoniae (A) 02/14/2024    WOUNDCULT 1+ Growth of Proteus vulgaris group (A) 02/14/2024    WOUNDCULT Few Colonies of 02/14/2024

## 2024-12-07 NOTE — ASSESSMENT & PLAN NOTE
On aspirin 81 mg daily, Xarelto 2.5 mg twice daily and statin   ASA and xarelto held for surgery- restarted 12/7  Continue statin

## 2024-12-07 NOTE — PLAN OF CARE
Problem: PHYSICAL THERAPY ADULT  Goal: Performs mobility at highest level of function for planned discharge setting.  See evaluation for individualized goals.  Description: Treatment/Interventions: Functional transfer training, Elevations, LE strengthening/ROM, Therapeutic exercise, Endurance training, Patient/family training, Equipment eval/education, Gait training, Bed mobility, Spoke to nursing  Equipment Recommended: Walker (RW)       See flowsheet documentation for full assessment, interventions and recommendations.  12/7/2024 1228 by Ira Edward PT  Note: Prognosis: Good  Problem List: Decreased strength, Decreased endurance, Impaired balance, Decreased mobility, Decreased range of motion, Orthopedic restrictions, Pain  Assessment: Pt is 79 y.o. female seen for PT evaluation on 12/7/2024 s/p admit to [facility] on 12/4/2024 w/ Closed left hip fracture, initial encounter ([facility]). POD 1 L hip IM nail. PT was consulted to assess pt's functional mobility and d/c needs. Order placed for PT eval and tx. PTA, pt resides with spouse in 1SH with 2 PATO, ambulates with cane. At time of eval, pt requiring min assist x2 for all phases of mobility. Upon evaluation, pt presenting with impaired functional mobility d/t decreased strength, decreased ROM, decreased endurance, impaired balance, decreased mobility, orthopedic restrictions of WBAT LLE, pain, and activity intolerance. Pertinent PMHx and current co-morbidities affecting pt's physical performance at time of assessment include: type 2 DM< HTN, anxiety and depression, PAD, COPD, SIRS, urinary retention, bladder neoplasm, age related osteoporosis, anemia, leukocytosis, toxic encephalopathy. Personal factors affecting pt at time of eval include: inability to ambulate household distances, inability to navigate level surfaces w/o external assistance, and positive fall history. The following objective measures performed on IE also reveal limitations: Barthel Index:  45/100, Modified [place]: 4 (moderate/severe disability), and AM-PAC 6-Clicks: 11/24. Pt's clinical presentation is currently unstable/unpredictable seen in pt's presentation of abnormal lab value(s), need for input for task focus and mobility technique, L hip pain impacting overall mobility status, and ongoing medical assessment. Overall, pt's rehab potential and prognosis to return to PLOF is good as impacted by objective findings, warranting pt to receive further skilled PT interventions to address identified impairments, activity limitation(s), and participation restriction(s). Pt to benefit from continued PT tx to address deficits as defined above and maximize level of functional independent mobility. From PT/mobility standpoint, recommend level 1, maximum resource intensity in order to facilitate return to PLOF.  Barriers to Discharge: Inaccessible home environment, Decreased caregiver support     Rehab Resource Intensity Level, PT: I (Maximum Resource Intensity)    See flowsheet documentation for full assessment.     12/7/2024 1228 by Ira Edward PT  Note: Prognosis: Good  Problem List: Decreased strength, Decreased endurance, Impaired balance, Decreased mobility, Decreased range of motion, Orthopedic restrictions, Pain  Assessment: Pt is 79 y.o. female seen for PT evaluation on 12/7/2024 s/p admit to [facility] on 12/4/2024 w/ Closed left hip fracture, initial encounter (H[facility]). POD 1 L hip IM nail. PT was consulted to assess pt's functional mobility and d/c needs. Order placed for PT eval and tx. PTA, pt resides with spouse in 1SH with 2 PATO, ambulates with cane. At time of eval, pt requiring min assist x2 for all phases of mobility. Upon evaluation, pt presenting with impaired functional mobility d/t decreased strength, decreased ROM, decreased endurance, impaired balance, decreased mobility, orthopedic restrictions of WBAT LLE, pain, and activity intolerance. Pertinent PMHx and current  co-morbidities affecting pt's physical performance at time of assessment include: type 2 DM< HTN, anxiety and depression, PAD, COPD, SIRS, urinary retention, bladder neoplasm, age related osteoporosis, anemia, leukocytosis, toxic encephalopathy. Personal factors affecting pt at time of eval include: inability to ambulate household distances, inability to navigate level surfaces w/o external assistance, and positive fall history. The following objective measures performed on IE also reveal limitations: Barthel Index: 45/100, Modified [place]: 4 (moderate/severe disability), and AM-PAC 6-Clicks: 11/24. Pt's clinical presentation is currently unstable/unpredictable seen in pt's presentation of abnormal lab value(s), need for input for task focus and mobility technique, L hip pain impacting overall mobility status, and ongoing medical assessment. Overall, pt's rehab potential and prognosis to return to PLOF is good as impacted by objective findings, warranting pt to receive further skilled PT interventions to address identified impairments, activity limitation(s), and participation restriction(s). Pt to benefit from continued PT tx to address deficits as defined above and maximize level of functional independent mobility. From PT/mobility standpoint, recommend level 1, maximum resource intensity in order to facilitate return to PLOF.  Barriers to Discharge: Inaccessible home environment, Decreased caregiver support     Rehab Resource Intensity Level, PT: I (Maximum Resource Intensity)    See flowsheet documentation for full assessment.

## 2024-12-07 NOTE — ASSESSMENT & PLAN NOTE
Lab Results   Component Value Date    HGBA1C 7.4 (H) 10/24/2024       Recent Labs     12/06/24  1317 12/06/24  1911 12/06/24  2041 12/07/24  0751   POCGLU 123 176* 255* 158*       Blood Sugar Average: Last 72 hrs:  (P) [id number]

## 2024-12-07 NOTE — NURSING NOTE
Patient into pacu, -130's. Rate regular. Patient confused, restless, SOB. EKG performed. Anesthesia present at bedside. Oxygen saturations low with titration from simple mask. Placed on 10L midflow NC. C/O intermittent nausea. See anesthesia note.

## 2024-12-08 LAB
ANION GAP SERPL CALCULATED.3IONS-SCNC: 3 MMOL/L (ref 4–13)
BUN SERPL-MCNC: 15 MG/DL (ref 5–25)
CALCIUM SERPL-MCNC: 9.2 MG/DL (ref 8.4–10.2)
CHLORIDE SERPL-SCNC: 101 MMOL/L (ref 96–108)
CO2 SERPL-SCNC: 33 MMOL/L (ref 21–32)
CREAT SERPL-MCNC: 0.59 MG/DL (ref 0.6–1.3)
ERYTHROCYTE [DISTWIDTH] IN BLOOD BY AUTOMATED COUNT: 14.3 % (ref 11.6–15.1)
GFR SERPL CREATININE-BSD FRML MDRD: 88 ML/MIN/1.73SQ M
GLUCOSE SERPL-MCNC: 148 MG/DL (ref 65–140)
GLUCOSE SERPL-MCNC: 160 MG/DL (ref 65–140)
GLUCOSE SERPL-MCNC: 187 MG/DL (ref 65–140)
GLUCOSE SERPL-MCNC: 200 MG/DL (ref 65–140)
GLUCOSE SERPL-MCNC: 278 MG/DL (ref 65–140)
HCT VFR BLD AUTO: 32 % (ref 34.8–46.1)
HGB BLD-MCNC: 10.1 G/DL (ref 11.5–15.4)
MCH RBC QN AUTO: 29.9 PG (ref 26.8–34.3)
MCHC RBC AUTO-ENTMCNC: 31.6 G/DL (ref 31.4–37.4)
MCV RBC AUTO: 95 FL (ref 82–98)
PLATELET # BLD AUTO: 246 THOUSANDS/UL (ref 149–390)
PMV BLD AUTO: 10.2 FL (ref 8.9–12.7)
POTASSIUM SERPL-SCNC: 4.8 MMOL/L (ref 3.5–5.3)
RBC # BLD AUTO: 3.38 MILLION/UL (ref 3.81–5.12)
SODIUM SERPL-SCNC: 137 MMOL/L (ref 135–147)
WBC # BLD AUTO: 7.6 THOUSAND/UL (ref 4.31–10.16)

## 2024-12-08 PROCEDURE — 99024 POSTOP FOLLOW-UP VISIT: CPT | Performed by: PHYSICIAN ASSISTANT

## 2024-12-08 PROCEDURE — 80048 BASIC METABOLIC PNL TOTAL CA: CPT | Performed by: STUDENT IN AN ORGANIZED HEALTH CARE EDUCATION/TRAINING PROGRAM

## 2024-12-08 PROCEDURE — 99232 SBSQ HOSP IP/OBS MODERATE 35: CPT | Performed by: INTERNAL MEDICINE

## 2024-12-08 PROCEDURE — 85027 COMPLETE CBC AUTOMATED: CPT | Performed by: PHYSICIAN ASSISTANT

## 2024-12-08 PROCEDURE — 82948 REAGENT STRIP/BLOOD GLUCOSE: CPT

## 2024-12-08 RX ADMIN — ATORVASTATIN CALCIUM 10 MG: 10 TABLET, FILM COATED ORAL at 17:26

## 2024-12-08 RX ADMIN — Medication: at 09:19

## 2024-12-08 RX ADMIN — OXYCODONE HYDROCHLORIDE 10 MG: 10 TABLET ORAL at 17:42

## 2024-12-08 RX ADMIN — DOCUSATE SODIUM 100 MG: 100 CAPSULE, LIQUID FILLED ORAL at 09:16

## 2024-12-08 RX ADMIN — CYANOCOBALAMIN TAB 500 MCG 1000 MCG: 500 TAB at 09:16

## 2024-12-08 RX ADMIN — OXYCODONE HYDROCHLORIDE 5 MG: 5 TABLET ORAL at 08:49

## 2024-12-08 RX ADMIN — CEFADROXIL 1000 MG: 500 CAPSULE ORAL at 09:16

## 2024-12-08 RX ADMIN — CEFADROXIL 1000 MG: 500 CAPSULE ORAL at 21:26

## 2024-12-08 RX ADMIN — NICOTINE 1 PATCH: 21 PATCH, EXTENDED RELEASE TRANSDERMAL at 09:17

## 2024-12-08 RX ADMIN — DOCUSATE SODIUM 100 MG: 100 CAPSULE, LIQUID FILLED ORAL at 17:26

## 2024-12-08 RX ADMIN — ACETAMINOPHEN 975 MG: 325 TABLET ORAL at 21:26

## 2024-12-08 RX ADMIN — OXYCODONE HYDROCHLORIDE 10 MG: 10 TABLET ORAL at 12:54

## 2024-12-08 RX ADMIN — POLYETHYLENE GLYCOL 3350 17 G: 17 POWDER, FOR SOLUTION ORAL at 09:16

## 2024-12-08 RX ADMIN — CLONAZEPAM 1 MG: 1 TABLET ORAL at 17:26

## 2024-12-08 RX ADMIN — FLUTICASONE FUROATE AND VILANTEROL TRIFENATATE 1 PUFF: 100; 25 POWDER RESPIRATORY (INHALATION) at 09:17

## 2024-12-08 RX ADMIN — RIVAROXABAN 2.5 MG: 2.5 TABLET, FILM COATED ORAL at 17:39

## 2024-12-08 RX ADMIN — ALBUTEROL SULFATE 2 PUFF: 90 AEROSOL, METERED RESPIRATORY (INHALATION) at 09:18

## 2024-12-08 RX ADMIN — PREGABALIN 200 MG: 100 CAPSULE ORAL at 17:26

## 2024-12-08 RX ADMIN — INSULIN LISPRO 2 UNITS: 100 INJECTION, SOLUTION INTRAVENOUS; SUBCUTANEOUS at 12:42

## 2024-12-08 RX ADMIN — INSULIN LISPRO 1 UNITS: 100 INJECTION, SOLUTION INTRAVENOUS; SUBCUTANEOUS at 21:26

## 2024-12-08 RX ADMIN — ASPIRIN 81 MG: 81 TABLET, COATED ORAL at 09:16

## 2024-12-08 RX ADMIN — PANTOPRAZOLE SODIUM 40 MG: 40 TABLET, DELAYED RELEASE ORAL at 09:16

## 2024-12-08 RX ADMIN — INSULIN LISPRO 4 UNITS: 100 INJECTION, SOLUTION INTRAVENOUS; SUBCUTANEOUS at 17:27

## 2024-12-08 RX ADMIN — OXYCODONE HYDROCHLORIDE 10 MG: 10 TABLET ORAL at 22:47

## 2024-12-08 RX ADMIN — PREGABALIN 200 MG: 100 CAPSULE ORAL at 21:26

## 2024-12-08 RX ADMIN — PREGABALIN 200 MG: 100 CAPSULE ORAL at 09:16

## 2024-12-08 RX ADMIN — BUPROPION HYDROCHLORIDE 300 MG: 150 TABLET, EXTENDED RELEASE ORAL at 09:16

## 2024-12-08 RX ADMIN — RIVAROXABAN 2.5 MG: 2.5 TABLET, FILM COATED ORAL at 07:52

## 2024-12-08 RX ADMIN — ACETAMINOPHEN 975 MG: 325 TABLET ORAL at 06:06

## 2024-12-08 NOTE — ASSESSMENT & PLAN NOTE
Lab Results   Component Value Date    HGBA1C 7.4 (H) 10/24/2024       Recent Labs     12/07/24  1650 12/07/24  2106 12/08/24  0714 12/08/24  1121   POCGLU 167* 230* 148* 200*       Blood Sugar Average: Last 72 hrs:  (P) 161.25

## 2024-12-08 NOTE — NURSING NOTE
Pt bladder scan for 419, refusing straight cath at this time. MD aware, will allow pt to sit on commode to try to void, run water, apply cold / warm compress.

## 2024-12-08 NOTE — ASSESSMENT & PLAN NOTE
Lab Results   Component Value Date    HGBA1C 7.4 (H) 10/24/2024       Recent Labs     12/07/24  1145 12/07/24  1650 12/07/24  2106 12/08/24  0714   POCGLU 194* 167* 230* 148*       Blood Sugar Average: Last 72 hrs:  (P) 158.7973105146414124  Continue to hold metformin and Januvia from home  SSI  Diabetic diet  Continue Lyrica for polyneuropathy.

## 2024-12-08 NOTE — PROGRESS NOTES
Progress Note - Orthopedics   Name: Lona Cedeno 79 y.o. female I MRN: [id number]  Unit/Bed#: -01 I Date of Admission: 12/4/2024   Date of Service: 12/8/2024 I Hospital Day: 4     Assessment & Plan  Closed left hip fracture, initial encounter ([facility])    Patient is POD2 s/p left hip IMN    - WBAT  - Abx: duricef 500 mg bid for 5 days  - DVT ppx: home eliquis and ASA today  - PT/OT  - Pain control per primary team  - Medical management per primary team  - Case management consult for dispo planning    Type 2 diabetes mellitus with diabetic polyneuropathy, without long-term current use of insulin ([facility])  Lab Results   Component Value Date    HGBA1C 7.4 (H) 10/24/2024       Recent Labs     12/07/24  1650 12/07/24  2106 12/08/24  0714 12/08/24  1121   POCGLU 167* 230* 148* 200*       Blood Sugar Average: Last 72 hrs:  (P) 161.25    Essential hypertension    Anxiety and depression    PAD (peripheral artery disease) ([facility])    COPD, severity to be determined ([facility])    Hyperkalemia    Hyponatremia    Acute respiratory insufficiency    Preoperative clearance    SIRS (systemic inflammatory response syndrome) ([facility])    Urinary retention    Abnormal EKG      Ok for discharge from Orthopedics service perspective.    Subjective   79 y.o.female who is resting comfortably in bed.  Her pain is controlled.  She denies any numbness or tingling.  She denies any fever or chills.  She states she is tired today.    Objective :  Temp:  [97.8 °F (36.6 °C)-97.9 °F (36.6 °C)] 97.8 °F (36.6 °C)  HR:  [75-89] 89  BP: (102-114)/(48-88) 105/73  Resp:  [16-19] 18  SpO2:  [93 %-97 %] 95 %  O2 Device: Nasal cannula  Nasal Cannula O2 Flow Rate (L/min):  [3 L/min] 3 L/min    Physical Exam  Left lower extremity is neurovascularly intact  Toes are pink and mobile  Compartments are soft  Dressings are clean, dry and intact  Negative Homans  Brisk cap refill  Sensation intact  No warmth erythema      Lab Results: I have reviewed the following  results:  Recent Labs     12/06/24  0508 12/07/24  0446 12/08/24  0438   WBC 9.46 9.79 7.60   HGB 13.1 11.3* 10.1*   HCT 41.7 36.2 32.0*    263 246   BUN 9 13 15   CREATININE 0.55* 0.56* 0.59*     Blood Culture:    Lab Results   Component Value Date    BLOODCX No Growth After 5 Days. 10/23/2024     Wound Culture:   Lab Results   Component Value Date    WOUNDCULT 1+ Growth of Klebsiella pneumoniae (A) 02/14/2024    WOUNDCULT 1+ Growth of Proteus vulgaris group (A) 02/14/2024    WOUNDCULT Few Colonies of 02/14/2024

## 2024-12-08 NOTE — ASSESSMENT & PLAN NOTE
The patient presented after a fall from standing at home on morning of admission.  She reports that she lost her balance and landed on her left side.  Imaging shows comminuted nondisplaced intertrochanteric fracture left proximal femur   Trauma work-up is negative in ED except left hip fracture.   CK normalized with IV fluids   Orthopedics input appreciated.   S/p left hip IMN on 12/6/2025  Restarted aspirin and Xarelto 12/7  Pain management  PT/OT recommending rehab  CM placed referrals

## 2024-12-08 NOTE — PROGRESS NOTES
Progress Note - Hospitalist   Name: Lona Cedeno 79 y.o. female I MRN: [id number]  Unit/Bed#: MS Silver-01 I Date of Admission: 12/4/2024   Date of Service: 12/8/2024 I Hospital Day: 4    Assessment & Plan  Closed left hip fracture, initial encounter ([facility])  The patient presented after a fall from standing at home on morning of admission.  She reports that she lost her balance and landed on her left side.  Imaging shows comminuted nondisplaced intertrochanteric fracture left proximal femur   Trauma work-up is negative in ED except left hip fracture.   CK normalized with IV fluids   Orthopedics input appreciated.   S/p left hip IMN on 12/6/2025  Restarted aspirin and Xarelto 12/7  Pain management  PT/OT recommending rehab  CM placed referrals   Type 2 diabetes mellitus with diabetic polyneuropathy, without long-term current use of insulin ([facility])  Lab Results   Component Value Date    HGBA1C 7.4 (H) 10/24/2024       Recent Labs     12/07/24  1145 12/07/24  1650 12/07/24  2106 12/08/24  0714   POCGLU 194* 167* 230* 148*       Blood Sugar Average: Last 72 hrs:  (P) 158.0230691922860207  Continue to hold metformin and Januvia from home  SSI  Diabetic diet  Continue Lyrica for polyneuropathy.     Essential hypertension  BP is relatively controlled. Some hypotension noted 12/5 possible related to pain medications.   Continue to hold amiloride and lisinopril for now   Monitor BP closely and re-initiate medications as appropriate  Anxiety and depression  Continue with Wellbutrin, clonazepam and Lyrica   PAD (peripheral artery disease) ([facility])  On aspirin 81 mg daily, Xarelto 2.5 mg twice daily and statin   ASA and xarelto held for surgery- restarted 12/7  Continue statin    COPD, severity to be determined ([facility])  In no acute exacerbation  Continue with Breo and as needed albuterol   Hyperkalemia  Continue to hold amiloride for now  Low potassium diet  Resolved- continue to monitor BMP   Hyponatremia  Likely hyponatremia hypovolemia  given that she was on the floor for a while with decreased p.o. intake  Received gentle IV fluids   Resolved- Monitor BMP   Acute respiratory insufficiency  Noted SpO2 of 86% on room air in the ED.   The patient chronically wears 2 L nasal cannula intermittently at home   Cxr (12/4)- no acute cardiopulmonary finding  Noted increase O2 requirement to 4L NC, now down to 3 L   Repeat cxr negaitve; possibly related to atelectasis  Continue with oxygen supplement  Monitor to keep SpO2 greater than 88%.  Incentive spirometry, cough and deep breathing   SIRS (systemic inflammatory response syndrome) (HCC)  Noted to have WBC of 16.73, HR 91, and tachypnea   Suspect that SIRS criteria is likely due to recent fall with left hip fracture. No acute sign of infection at this time.   UA- negative   Will monitor off antibiotic  SIRS parameters resolved  Urinary retention  Likely due to acute left hip fracture and nonambulatory status  UA- negative   Will hold oxybutynin for now  Continue with urinary retention protocol  Abnormal EKG  Anesthesiology reported patient had a slightly wider QRS complex postop and recommended telemetry monitoring and Echocardiogram prior to discharge  Continue telemetry  Echo pending     VTE Pharmacologic Prophylaxis: VTE Score: 9 High Risk (Score >/= 5) - Pharmacological DVT Prophylaxis Ordered: rivaroxaban (Xarelto). Sequential Compression Devices Ordered.    Mobility:   Basic Mobility Inpatient Raw Score: 11  JH-HLM Goal: 4: Move to chair/commode  JH-HLM Achieved: 5: Stand (1 or more minutes)  JH-HLM Goal achieved. Continue to encourage appropriate mobility.    Patient Centered Rounds: I performed bedside rounds with nursing staff today.   Discussions with Specialists or Other Care Team Provider: nursing    Education and Discussions with Family / Patient: Attempted to update  () via phone. Left voicemail.     Current Length of Stay: 4 day(s)  Current Patient Status: Inpatient    Certification Statement: The patient will continue to require additional inpatient hospital stay due to need for rehab  Discharge Plan: Anticipate discharge in 24-48 hrs to rehab facility.    Code Status: Level 3 - DNAR and DNI    Subjective   The patient was seen and examined. The patient is sitting up on the side of her bed in no acute distress. She reports left hip pain.     Objective :  Temp:  [97.8 °F (36.6 °C)-97.9 °F (36.6 °C)] 97.8 °F (36.6 °C)  HR:  [75-80] 75  BP: (102-114)/(48-88) 104/48  Resp:  [16-19] 18  SpO2:  [93 %-97 %] 93 %  O2 Device: Nasal cannula  Nasal Cannula O2 Flow Rate (L/min):  [3 L/min] 3 L/min    Body mass index is 34.03 kg/m².     Input and Output Summary (last 24 hours):     Intake/Output Summary (Last 24 hours) at 12/8/2024 0840  Last data filed at 12/8/2024 0828  Gross per 24 hour   Intake 1080 ml   Output 1140 ml   Net -60 ml       Physical Exam  Vitals and nursing note reviewed.   Constitutional:       General: She is awake.      Appearance: Normal appearance.      Interventions: Nasal cannula in place.   HENT:      Head: Normocephalic and atraumatic.   Cardiovascular:      Rate and Rhythm: Normal rate and regular rhythm.      Heart sounds: Normal heart sounds.   Pulmonary:      Effort: Pulmonary effort is normal.      Breath sounds: Normal breath sounds.   Abdominal:      Palpations: Abdomen is soft.      Tenderness: There is no abdominal tenderness.   Skin:     General: Skin is warm and dry.   Neurological:      General: No focal deficit present.      Mental Status: She is alert and oriented to person, place, and time.   Psychiatric:         Attention and Perception: Attention normal.         Mood and Affect: Mood normal.         Speech: Speech normal.         Behavior: Behavior is cooperative.           Lines/Drains:              Lab Results: I have reviewed the following results:   Results from last 7 days   Lab Units 12/08/24  0438 12/05/24  0531 12/04/24  1457   WBC  Thousand/uL 7.60   < > 16.73*   HEMOGLOBIN g/dL 10.1*   < > 13.0   HEMATOCRIT % 32.0*   < > 41.1   PLATELETS Thousands/uL 246   < > 260   SEGS PCT %  --   --  86*   LYMPHO PCT %  --   --  5*   MONO PCT %  --   --  7   EOS PCT %  --   --  1    < > = values in this interval not displayed.     Results from last 7 days   Lab Units 12/08/24  0438 12/04/24  1748 12/04/24  1457   SODIUM mmol/L 137   < > 131*   POTASSIUM mmol/L 4.8   < > 5.6*   CHLORIDE mmol/L 101   < > 98   CO2 mmol/L 33*   < > 27   BUN mg/dL 15   < > 18   CREATININE mg/dL 0.59*   < > 0.73   ANION GAP mmol/L 3*   < > 6   CALCIUM mg/dL 9.2   < > 10.2   ALBUMIN g/dL  --   --  4.4   TOTAL BILIRUBIN mg/dL  --   --  0.65   ALK PHOS U/L  --   --  60   ALT U/L  --   --  16   AST U/L  --   --  31   GLUCOSE RANDOM mg/dL 160*   < > 147*    < > = values in this interval not displayed.     Results from last 7 days   Lab Units 12/04/24  1457   INR  1.14     Results from last 7 days   Lab Units 12/08/24  0714 12/07/24  2106 12/07/24  1650 12/07/24  1145 12/07/24  0751 12/06/24  2041 12/06/24  1911 12/06/24  1317 12/06/24  1305 12/06/24  1050 12/06/24  0643 12/05/24  2110   POC GLUCOSE mg/dl 148* 230* 167* 194* 158* 255* 176* 123 125 139 142* 131               Recent Cultures (last 7 days):         Imaging Results Review: No pertinent imaging studies reviewed.  Other Study Results Review: No additional pertinent studies reviewed.    Last 24 Hours Medication List:     Current Facility-Administered Medications:     acetaminophen (TYLENOL) tablet 975 mg, Q8H JILLIAN    albuterol (PROVENTIL HFA,VENTOLIN HFA) inhaler 2 puff, Q6H PRN    ammonium lactate (LAC-HYDRIN) 12 % lotion, Daily    aspirin (ECOTRIN LOW STRENGTH) EC tablet 81 mg, Daily    atorvastatin (LIPITOR) tablet 10 mg, Daily With Dinner    buPROPion (WELLBUTRIN XL) 24 hr tablet 300 mg, Daily    cefadroxil (DURICEF) capsule 1,000 mg, Q12H JILLIAN    clonazePAM (KlonoPIN) tablet 1 mg, QPM    cyanocobalamin (VITAMIN B-12)  tablet 1,000 mcg, Daily    docusate sodium (COLACE) capsule 100 mg, BID    Fluticasone Furoate-Vilanterol 100-25 mcg/actuation 1 puff, Daily    insulin lispro (HumALOG/ADMELOG) 100 units/mL subcutaneous injection 1-6 Units, TID AC **AND** Fingerstick Glucose (POCT), TID AC    insulin lispro (HumALOG/ADMELOG) 100 units/mL subcutaneous injection 1-6 Units, HS    lactated ringers infusion, Continuous, Last Rate: 20 mL/hr (12/06/24 2008)    morphine injection 2 mg, Q4H PRN    nicotine (NICODERM CQ) 21 mg/24 hr TD 24 hr patch 1 patch, Daily    ondansetron (ZOFRAN) injection 4 mg, Q6H PRN    oxyCODONE (ROXICODONE) immediate release tablet 10 mg, Q4H PRN    oxyCODONE (ROXICODONE) IR tablet 5 mg, Q4H PRN    pantoprazole (PROTONIX) EC tablet 40 mg, Daily    polyethylene glycol (MIRALAX) packet 17 g, Daily    pregabalin (LYRICA) capsule 200 mg, TID    rivaroxaban (XARELTO) tablet 2.5 mg, BID With Meals    Administrative Statements   Today, Patient Was Seen By: Nate Burns PA-C  I have spent a total time of 35 minutes in caring for this patient on the day of the visit/encounter including Counseling / Coordination of care, Documenting in the medical record, and Reviewing / ordering tests, medicine, procedures  .    **Please Note: This note may have been constructed using a voice recognition system.**

## 2024-12-08 NOTE — PLAN OF CARE
Problem: Potential for Falls  Goal: Patient will remain free of falls  Description: INTERVENTIONS:  - Educate patient/family on patient safety including physical limitations  - Instruct patient to call for assistance with activity   - Consult OT/PT to assist with strengthening/mobility   - Keep Call bell within reach  - Keep bed low and locked with side rails adjusted as appropriate  - Keep care items and personal belongings within reach  - Initiate and maintain comfort rounds  - Make Fall Risk Sign visible to staff  - Offer Toileting every 2 Hours, in advance of need  - Initiate/Maintain bed alarm  - Obtain necessary fall risk management equipment  - Apply yellow socks and bracelet for high fall risk patients  - Consider moving patient to room near nurses station  Outcome: Progressing     Problem: Prexisting or High Potential for Compromised Skin Integrity  Goal: Skin integrity is maintained or improved  Description: INTERVENTIONS:  - Identify patients at risk for skin breakdown  - Assess and monitor skin integrity  - Assess and monitor nutrition and hydration status  - Monitor labs   - Assess for incontinence   - Turn and reposition patient  - Assist with mobility/ambulation  - Relieve pressure over bony prominences  - Avoid friction and shearing  - Provide appropriate hygiene as needed including keeping skin clean and dry  - Evaluate need for skin moisturizer/barrier cream  - Collaborate with interdisciplinary team   - Patient/family teaching  - Consider wound care consult   Outcome: Progressing     Problem: PAIN - ADULT  Goal: Verbalizes/displays adequate comfort level or baseline comfort level  Description: Interventions:  - Encourage patient to monitor pain and request assistance  - Assess pain using appropriate pain scale  - Administer analgesics based on type and severity of pain and evaluate response  - Implement non-pharmacological measures as appropriate and evaluate response  - Consider cultural and  social influences on pain and pain management  - Notify physician/advanced practitioner if interventions unsuccessful or patient reports new pain  Outcome: Progressing     Problem: INFECTION - ADULT  Goal: Absence or prevention of progression during hospitalization  Description: INTERVENTIONS:  - Assess and monitor for signs and symptoms of infection  - Monitor lab/diagnostic results  - Monitor all insertion sites, i.e. indwelling lines, tubes, and drains  - Monitor endotracheal if appropriate and nasal secretions for changes in amount and color  - [place] appropriate cooling/warming therapies per order  - Administer medications as ordered  - Instruct and encourage patient and family to use good hand hygiene technique  - Identify and instruct in appropriate isolation precautions for identified infection/condition  Outcome: Progressing    Goal: Maintain or return to baseline ADL function  Description: INTERVENTIONS:  -  Assess patient's ability to carry out ADLs; assess patient's baseline for ADL function and identify physical deficits which impact ability to perform ADLs (bathing, care of mouth/teeth, toileting, grooming, dressing, etc.)  - Assess/evaluate cause of self-care deficits   - Assess range of motion  - Assess patient's mobility; develop plan if impaired  - Assess patient's need for assistive devices and provide as appropriate  - Encourage maximum independence but intervene and supervise when necessary  - Involve family in performance of ADLs  - Assess for home care needs following discharge   - Consider OT consult to assist with ADL evaluation and planning for discharge  - Provide patient education as appropriate  Outcome: Progressing  Goal: Maintains/Returns to pre admission functional level  Description: INTERVENTIONS:  - Perform AM-PAC 6 Click Basic Mobility/ Daily Activity assessment daily.  - Set and communicate daily mobility goal to care team and patient/family/caregiver.   - Collaborate with  rehabilitation services on mobility goals if consulted  - Perform Range of Motion 3 times a day.  - Reposition patient every 2 hours.  - Dangle patient 3 times a day  - Stand patient 3 times a day  - Ambulate patient 3 times a day  - Out of bed to chair 3 times a day   - Out of bed for meals 3 times a day  - Out of bed for toileting  - Record patient progress and toleration of activity level   Outcome: Progressing     Problem: DISCHARGE PLANNING  Goal: Discharge to home or other facility with appropriate resources  Description: INTERVENTIONS:  - Identify barriers to discharge w/patient and caregiver  - Arrange for needed discharge resources and transportation as appropriate  - Identify discharge learning needs (meds, wound care, etc.)  - Arrange for interpretive services to assist at discharge as needed  - Refer to Case Management Department for coordinating discharge planning if the patient needs post-hospital services based on physician/advanced practitioner order or complex needs related to functional status, cognitive ability, or social support system  Outcome: Progressing     Problem: Knowledge Deficit  Goal: Patient/family/caregiver demonstrates understanding of disease process, treatment plan, medications, and discharge instructions  Description: Complete learning assessment and assess knowledge base.  Interventions:  - Provide teaching at level of understanding  - Provide teaching via preferred learning methods  Outcome: Progressing

## 2024-12-08 NOTE — ASSESSMENT & PLAN NOTE
Noted SpO2 of 86% on room air in the ED.   The patient chronically wears 2 L nasal cannula intermittently at home   Cxr (12/4)- no acute cardiopulmonary finding  Noted increase O2 requirement to 4L NC, now down to 3 L   Repeat cxr negaitve; possibly related to atelectasis  Continue with oxygen supplement  Monitor to keep SpO2 greater than 88%.  Incentive spirometry, cough and deep breathing

## 2024-12-08 NOTE — CASE MANAGEMENT
Case Management Discharge Planning Note    Patient name Lona Cedeno  Location /-01 MRN [id number]  : 1945 Date 2024       Current Admission Date: 2024  Current Admission Diagnosis:Closed left hip fracture, initial encounter ([facility])   Patient Active Problem List    Diagnosis Date Noted Date Diagnosed    Abnormal EKG 2024     Urinary retention 2024     Closed left hip fracture, initial encounter ([facility]) 2024     Acute respiratory insufficiency 2024     Preoperative clearance 2024     SIRS (systemic inflammatory response syndrome) ([facility]) 2024     Acute encephalopathy 10/25/2024     Hyponatremia 10/24/2024     Open wound of right foot 2024     Hyperkalemia 2024     Acute metabolic encephalopathy 2024     Toxic encephalopathy 2024     Acute respiratory failure with hypoxia ([facility]) 2024     Sacral wound 2024     Other dietary vitamin B12 deficiency anemia 2024     Leukocytosis 2024     At risk for venous thromboembolism (VTE) 2024     At high risk for skin breakdown 2024     Wound of skin 2024     Impaired mobility and activities of daily living 2024     Anemia 2024     Constipation 2024     Age-related osteoporosis without current pathological fracture 2023     Abnormal CT of the chest 10/17/2023     COPD, severity to be determined ([facility]) 10/17/2023     Tobacco use disorder 10/17/2023     PAD (peripheral artery disease) ([facility]) 10/10/2023     Bladder neoplasm 2023     Left renal mass 2023     Hypomagnesemia 2023     Degenerative lumbar disc 2023     Urinary incontinence 2023     GERD without esophagitis 10/16/2019     Essential hypertension 02/15/2019     Anxiety and depression 02/15/2019     Type 2 diabetes mellitus with diabetic polyneuropathy, without long-term current use of insulin ([facility]) 2019       LOS (days): 4  Geometric Mean LOS  (GMLOS) (days): 2.8  Days to GMLOS:-1     OBJECTIVE:  Risk of Unplanned Readmission Score: 38.19         Current admission status: Inpatient   Preferred Pharmacy:   [id number] - [place] - [address]  [address] [facility] [place] [address]  Phone: [phone] Fax: [phone]    [facility]  [place] - [address]  [address]  [place] [address]  Phone: [phone] Fax: [phone]    [facility] - [place], [place] - [address]  [address]  [place] [address]  Phone: [phone] Fax: [phone]    Primary Care Provider: Vivek Preston DO    Primary Insurance: [facility] RE[facility]  Secondary Insurance:     DISCHARGE DETAILS:    Discharge planning discussed with:: patient - cm met with the pt on 12/7/2024  Freedom of Choice: Yes  Comments - Freedom of Choice: recommendation is for rehab- cm was asked to send a referral to [facility] and kecia was given to send snf rehab referrals-  auth is needed                     Requested Home Health Care         Is the patient interested in HHC at discharge?: No    DME Referral Provided  Referral made for DME?: No    Other Referral/Resources/Interventions Provided:  Interventions: Acute Rehab, Short Term Rehab  Referral Comments: referral was sent to [facility] & snf rehab referrals via aidin- auth is needed- pt does not want [facility]- echo is needed    Would you like to participate in our [facility] service program?  : No - Declined

## 2024-12-08 NOTE — ASSESSMENT & PLAN NOTE
Patient is POD2 s/p left hip IMN    - WBAT  - Abx: duricef 500 mg bid for 5 days  - DVT ppx: home eliquis and ASA today  - PT/OT  - Pain control per primary team  - Medical management per primary team  - Case management consult for dispo planning

## 2024-12-09 ENCOUNTER — APPOINTMENT (INPATIENT)
Dept: NON INVASIVE DIAGNOSTICS | Facility: HOSPITAL | Age: 79
DRG: 481 | End: 2024-12-09
Payer: COMMERCIAL

## 2024-12-09 LAB
ANION GAP SERPL CALCULATED.3IONS-SCNC: 5 MMOL/L (ref 4–13)
AORTIC ROOT: 2.8 CM
ATRIAL RATE: 109 BPM
AV LVOT MEAN GRADIENT: 2 MMHG
AV LVOT PEAK GRADIENT: 3 MMHG
BSA FOR ECHO PROCEDURE: 1.82 M2
BUN SERPL-MCNC: 11 MG/DL (ref 5–25)
CALCIUM SERPL-MCNC: 9.2 MG/DL (ref 8.4–10.2)
CHLORIDE SERPL-SCNC: 101 MMOL/L (ref 96–108)
CO2 SERPL-SCNC: 32 MMOL/L (ref 21–32)
CREAT SERPL-MCNC: 0.51 MG/DL (ref 0.6–1.3)
DOP CALC LVOT PEAK VEL VTI: 16.99 CM
DOP CALC LVOT PEAK VEL: 0.93 M/S
E WAVE DECELERATION TIME: 135 MS
E/A RATIO: 0.82
ERYTHROCYTE [DISTWIDTH] IN BLOOD BY AUTOMATED COUNT: 14.2 % (ref 11.6–15.1)
FRACTIONAL SHORTENING: 29 (ref 28–44)
GFR SERPL CREATININE-BSD FRML MDRD: 92 ML/MIN/1.73SQ M
GLUCOSE SERPL-MCNC: 174 MG/DL (ref 65–140)
GLUCOSE SERPL-MCNC: 184 MG/DL (ref 65–140)
GLUCOSE SERPL-MCNC: 184 MG/DL (ref 65–140)
GLUCOSE SERPL-MCNC: 211 MG/DL (ref 65–140)
GLUCOSE SERPL-MCNC: 217 MG/DL (ref 65–140)
HCT VFR BLD AUTO: 33.3 % (ref 34.8–46.1)
HGB BLD-MCNC: 10.2 G/DL (ref 11.5–15.4)
INTERVENTRICULAR SEPTUM IN DIASTOLE (PARASTERNAL SHORT AXIS VIEW): 1.1 CM
INTERVENTRICULAR SEPTUM: 1.1 CM (ref 0.6–1.1)
LEFT ATRIUM SIZE: 3.6 CM
LEFT INTERNAL DIMENSION IN SYSTOLE: 3.4 CM (ref 2.1–4)
LEFT VENTRICULAR INTERNAL DIMENSION IN DIASTOLE: 4.8 CM (ref 3.5–6)
LEFT VENTRICULAR POSTERIOR WALL IN END DIASTOLE: 0.9 CM
LEFT VENTRICULAR STROKE VOLUME: 61 ML
LVSV (TEICH): 61 ML
MCH RBC QN AUTO: 29.7 PG (ref 26.8–34.3)
MCHC RBC AUTO-ENTMCNC: 30.6 G/DL (ref 31.4–37.4)
MCV RBC AUTO: 97 FL (ref 82–98)
MV E'TISSUE VEL-SEP: 10 CM/S
MV PEAK A VEL: 1.06 M/S
MV PEAK E VEL: 87 CM/S
MV STENOSIS PRESSURE HALF TIME: 39 MS
MV VALVE AREA P 1/2 METHOD: 5.6
P AXIS: 44 DEGREES
PLATELET # BLD AUTO: 260 THOUSANDS/UL (ref 149–390)
PMV BLD AUTO: 9.7 FL (ref 8.9–12.7)
POTASSIUM SERPL-SCNC: 4.5 MMOL/L (ref 3.5–5.3)
PR INTERVAL: 168 MS
QRS AXIS: -8 DEGREES
QRSD INTERVAL: 130 MS
QT INTERVAL: 356 MS
QTC INTERVAL: 479 MS
RBC # BLD AUTO: 3.44 MILLION/UL (ref 3.81–5.12)
SL CV LV EF: 35
SL CV PED ECHO LEFT VENTRICLE DIASTOLIC VOLUME (MOD BIPLANE) 2D: 109 ML
SL CV PED ECHO LEFT VENTRICLE SYSTOLIC VOLUME (MOD BIPLANE) 2D: 48 ML
SODIUM SERPL-SCNC: 138 MMOL/L (ref 135–147)
T WAVE AXIS: 31 DEGREES
VENTRICULAR RATE: 109 BPM
WBC # BLD AUTO: 6.97 THOUSAND/UL (ref 4.31–10.16)

## 2024-12-09 PROCEDURE — 80048 BASIC METABOLIC PNL TOTAL CA: CPT | Performed by: STUDENT IN AN ORGANIZED HEALTH CARE EDUCATION/TRAINING PROGRAM

## 2024-12-09 PROCEDURE — 93306 TTE W/DOPPLER COMPLETE: CPT

## 2024-12-09 PROCEDURE — 93010 ELECTROCARDIOGRAM REPORT: CPT | Performed by: INTERNAL MEDICINE

## 2024-12-09 PROCEDURE — 97167 OT EVAL HIGH COMPLEX 60 MIN: CPT

## 2024-12-09 PROCEDURE — 97530 THERAPEUTIC ACTIVITIES: CPT

## 2024-12-09 PROCEDURE — 99232 SBSQ HOSP IP/OBS MODERATE 35: CPT | Performed by: INTERNAL MEDICINE

## 2024-12-09 PROCEDURE — 93306 TTE W/DOPPLER COMPLETE: CPT | Performed by: INTERNAL MEDICINE

## 2024-12-09 PROCEDURE — 99024 POSTOP FOLLOW-UP VISIT: CPT | Performed by: STUDENT IN AN ORGANIZED HEALTH CARE EDUCATION/TRAINING PROGRAM

## 2024-12-09 PROCEDURE — 85027 COMPLETE CBC AUTOMATED: CPT | Performed by: PHYSICIAN ASSISTANT

## 2024-12-09 PROCEDURE — 82948 REAGENT STRIP/BLOOD GLUCOSE: CPT

## 2024-12-09 PROCEDURE — 97110 THERAPEUTIC EXERCISES: CPT

## 2024-12-09 RX ORDER — LISINOPRIL 5 MG/1
5 TABLET ORAL DAILY
Status: DISCONTINUED | OUTPATIENT
Start: 2024-12-09 | End: 2024-12-11 | Stop reason: HOSPADM

## 2024-12-09 RX ADMIN — LISINOPRIL 5 MG: 5 TABLET ORAL at 16:32

## 2024-12-09 RX ADMIN — PREGABALIN 200 MG: 100 CAPSULE ORAL at 16:32

## 2024-12-09 RX ADMIN — ACETAMINOPHEN 975 MG: 325 TABLET ORAL at 14:16

## 2024-12-09 RX ADMIN — DOCUSATE SODIUM 100 MG: 100 CAPSULE, LIQUID FILLED ORAL at 17:04

## 2024-12-09 RX ADMIN — OXYCODONE HYDROCHLORIDE 5 MG: 5 TABLET ORAL at 14:16

## 2024-12-09 RX ADMIN — DOCUSATE SODIUM 100 MG: 100 CAPSULE, LIQUID FILLED ORAL at 08:59

## 2024-12-09 RX ADMIN — INSULIN LISPRO 1 UNITS: 100 INJECTION, SOLUTION INTRAVENOUS; SUBCUTANEOUS at 21:15

## 2024-12-09 RX ADMIN — PANTOPRAZOLE SODIUM 40 MG: 40 TABLET, DELAYED RELEASE ORAL at 08:58

## 2024-12-09 RX ADMIN — CYANOCOBALAMIN TAB 500 MCG 1000 MCG: 500 TAB at 08:59

## 2024-12-09 RX ADMIN — INSULIN LISPRO 1 UNITS: 100 INJECTION, SOLUTION INTRAVENOUS; SUBCUTANEOUS at 16:32

## 2024-12-09 RX ADMIN — ASPIRIN 81 MG: 81 TABLET, COATED ORAL at 08:58

## 2024-12-09 RX ADMIN — ACETAMINOPHEN 975 MG: 325 TABLET ORAL at 05:29

## 2024-12-09 RX ADMIN — POLYETHYLENE GLYCOL 3350 17 G: 17 POWDER, FOR SOLUTION ORAL at 08:59

## 2024-12-09 RX ADMIN — PREGABALIN 200 MG: 100 CAPSULE ORAL at 08:59

## 2024-12-09 RX ADMIN — CEFADROXIL 1000 MG: 500 CAPSULE ORAL at 08:58

## 2024-12-09 RX ADMIN — CEFADROXIL 1000 MG: 500 CAPSULE ORAL at 21:15

## 2024-12-09 RX ADMIN — RIVAROXABAN 2.5 MG: 2.5 TABLET, FILM COATED ORAL at 16:33

## 2024-12-09 RX ADMIN — NICOTINE 1 PATCH: 21 PATCH, EXTENDED RELEASE TRANSDERMAL at 09:00

## 2024-12-09 RX ADMIN — CLONAZEPAM 1 MG: 1 TABLET ORAL at 17:04

## 2024-12-09 RX ADMIN — ATORVASTATIN CALCIUM 10 MG: 10 TABLET, FILM COATED ORAL at 16:32

## 2024-12-09 RX ADMIN — ACETAMINOPHEN 975 MG: 325 TABLET ORAL at 21:14

## 2024-12-09 RX ADMIN — OXYCODONE HYDROCHLORIDE 10 MG: 10 TABLET ORAL at 21:44

## 2024-12-09 RX ADMIN — PREGABALIN 200 MG: 100 CAPSULE ORAL at 21:15

## 2024-12-09 RX ADMIN — INSULIN LISPRO 2 UNITS: 100 INJECTION, SOLUTION INTRAVENOUS; SUBCUTANEOUS at 11:24

## 2024-12-09 RX ADMIN — BUPROPION HYDROCHLORIDE 300 MG: 150 TABLET, EXTENDED RELEASE ORAL at 08:58

## 2024-12-09 RX ADMIN — Medication: at 09:04

## 2024-12-09 RX ADMIN — FLUTICASONE FUROATE AND VILANTEROL TRIFENATATE 1 PUFF: 100; 25 POWDER RESPIRATORY (INHALATION) at 09:00

## 2024-12-09 RX ADMIN — RIVAROXABAN 2.5 MG: 2.5 TABLET, FILM COATED ORAL at 09:00

## 2024-12-09 RX ADMIN — INSULIN LISPRO 1 UNITS: 100 INJECTION, SOLUTION INTRAVENOUS; SUBCUTANEOUS at 08:59

## 2024-12-09 NOTE — ARC ADMISSION
[facility]  spoke with patient's daughter, Ariela via phone. Introduced self, explained role, reviewed ARC program, services offered, acute rehab criteria, review of referral by [facility] Medical Director, insurance authorization process, three ARC locations and anticipated rehab length of stay. [facility]C Rehab folder will be delivered top Pt's room. All questions were answered. Patient's  daughter's only choice is [facility]. Daughter was made aware ARC Reviewer will communicate with their Care Manager who will keep patient updated on referral status.

## 2024-12-09 NOTE — PLAN OF CARE
Problem: Potential for Falls  Goal: Patient will remain free of falls  Description: INTERVENTIONS:  - Educate patient/family on patient safety including physical limitations  - Instruct patient to call for assistance with activity   - Consult OT/PT to assist with strengthening/mobility   - Keep Call bell within reach  - Keep bed low and locked with side rails adjusted as appropriate  - Keep care items and personal belongings within reach  - Initiate and maintain comfort rounds  - Make Fall Risk Sign visible to staff  - Offer Toileting every 2 Hours, in advance of need  - Initiate/Maintain bed  alarm  - Obtain necessary fall risk management equipment: non skid socks  - Apply yellow socks and bracelet for high fall risk patients  - Consider moving patient to room near nurses station  Outcome: Progressing

## 2024-12-09 NOTE — PROGRESS NOTES
Progress Note - Orthopedics   Name: Lona Cedeno 79 y.o. female I MRN: [id number]  Unit/Bed#: -01 I Date of Admission: 12/4/2024   Date of Service: 12/9/2024 I Hospital Day: 5     Assessment & Plan  Closed left hip fracture, initial encounter ([facility])    Patient is POD3 s/p left hip IMN    - WBAT  - Abx: duricef 500 mg bid for 5 days  - DVT ppx: home eliquis and ASA today  - PT/OT  - Pain control per primary team  - Medical management per primary team  - Case management consult for dispo planning    Type 2 diabetes mellitus with diabetic polyneuropathy, without long-term current use of insulin ([facility])  Lab Results   Component Value Date    HGBA1C 7.4 (H) 10/24/2024       Recent Labs     12/08/24  1616 12/08/24  2053 12/09/24  0715 12/09/24  1116   POCGLU 278* 187* 184* 211*       Blood Sugar Average: Last 72 hrs:  (P) 182.3125    Essential hypertension    Anxiety and depression    PAD (peripheral artery disease) ([facility])    COPD, severity to be determined ([facility])    Hyperkalemia    Hyponatremia    Acute respiratory insufficiency    Preoperative clearance    SIRS (systemic inflammatory response syndrome) ([facility])    Urinary retention    Abnormal EKG      Ok for discharge from Orthopedics service perspective.    Subjective   79 y.o.female who is resting comfortably in bed.  Her pain is controlled.  She denies any numbness or tingling.  She denies any fever or chills.  She states she is tired today due to the weather.    Objective :  Temp:  [97.8 °F (36.6 °C)-98 °F (36.7 °C)] 97.8 °F (36.6 °C)  HR:  [83-99] 99  BP: (121-154)/(61-68) 154/61  Resp:  [16-18] 16  SpO2:  [89 %-93 %] 93 %  O2 Device: Nasal cannula  Nasal Cannula O2 Flow Rate (L/min):  [2 L/min] 2 L/min    Physical Exam  Left lower extremity is neurovascularly intact  Toes are pink and mobile  Compartments are soft  Dressings are clean, dry and intact  Negative Homans  Brisk cap refill  Sensation intact  No warmth erythema      Lab Results: I have reviewed the  following results:  Recent Labs     12/07/24  0446 12/08/24  0438 12/09/24  0527   WBC 9.79 7.60 6.97   HGB 11.3* 10.1* 10.2*   HCT 36.2 32.0* 33.3*    246 260   BUN 13 15 11   CREATININE 0.56* 0.59* 0.51*     Blood Culture:    Lab Results   Component Value Date    BLOODCX No Growth After 5 Days. 10/23/2024     Wound Culture:   Lab Results   Component Value Date    WOUNDCULT 1+ Growth of Klebsiella pneumoniae (A) 02/14/2024    WOUNDCULT 1+ Growth of Proteus vulgaris group (A) 02/14/2024    WOUNDCULT Few Colonies of 02/14/2024

## 2024-12-09 NOTE — ASSESSMENT & PLAN NOTE
BP elevated today.  Will restart lisinopril   Continue to hold amiloride    Monitor BP closely and re-initiate medications as appropriate

## 2024-12-09 NOTE — PLAN OF CARE
Problem: PHYSICAL THERAPY ADULT  Goal: Performs mobility at highest level of function for planned discharge setting.  See evaluation for individualized goals.  Description: Treatment/Interventions: Functional transfer training, Elevations, LE strengthening/ROM, Therapeutic exercise, Endurance training, Patient/family training, Equipment eval/education, Gait training, Bed mobility, Spoke to nursing  Equipment Recommended: Walker (RW)       See flowsheet documentation for full assessment, interventions and recommendations.  Outcome: Progressing  Note: Prognosis: Good  Problem List: Decreased strength, Decreased endurance, Impaired balance, Decreased mobility, Decreased range of motion, Orthopedic restrictions, Pain  Assessment: Pt seen for PT treatment session this date with interventions consisting of therapeutic exercise to improve strength to improve functional mobility and therapeutic activity to improve transfers and increase activity tolerance with functional mobility to decrease fall risk. Pt agreeable to PT treatment session upon arrival, pt found supine in bed, in no apparent distress. Since previous session, pt has made fair progress as evidenced by ability to mobilize OOB  Barriers during this session include pain and fatigue.  Pt continues to be functioning below baseline level, and remains limited 2* factors listed above and including decreased strength, impaired activity tolerance, impaired  balance, pain, decreased endurance, and decreased mobility.  Pt prognosis for achieving goals is good, pending pt progress with hospitalization/medical status improvements, and indicated by motivated to participate in therapy, ability to follow cues, and improvement with mobility status. PT will continue to see pt during current hospitalization in order to address the deficits listed above and provide interventions consistent w/ POC in effort to achieve goals. Current goals and POC remain appropriate, pt continues  to have rehab potential  Upon conclusion pt seated OOB in recliner. The patient's AM-PAC Basic Mobility Inpatient Short Form Raw Score is 7.  A Raw score of less than or equal to 16 suggests the patient may benefit from discharge to post-acute rehabilitation services. Based on patient presentations and impairments, pt would most appropriately benefit from Level 1 resource intensity upon discharge.  Please also refer to the recommendation of the Physical Therapist for safe discharge planning. RN verbalized pt appropriate for PT session. This session, pt required and most appropriately benefited from skilled OT/PT co-treat due to extensive physical assistance of two therapists to achieve transitional movements and decreased activity tolerance.  Barriers to Discharge: Inaccessible home environment, Decreased caregiver support     Rehab Resource Intensity Level, PT: I (Maximum Resource Intensity)    See flowsheet documentation for full assessment.

## 2024-12-09 NOTE — ASSESSMENT & PLAN NOTE
The patient presented after a fall from standing at home on morning of admission.  She reports that she lost her balance and landed on her left side.  Imaging shows comminuted nondisplaced intertrochanteric fracture left proximal femur   Trauma work-up is negative in ED except left hip fracture.   CK normalized with IV fluids   Orthopedics input appreciated.   S/p left hip IMN on 12/6/2025  Restarted aspirin and Xarelto 12/7  Pain management  PT/OT recommending rehab  CM placed referrals- patient accepted at [facility]- insurance auth started

## 2024-12-09 NOTE — CASE MANAGEMENT
[facility] received request for authorization from Care Manager.  Authorization request submitted for: Acute Rehab  Facility Name: [facility]  NPI: [id number]  Facility MD: Ashley DePadua  NPI: [id number]  Authorization initiated by contacting insurance:  Ann  Via: Jack Erwin   Clinicals submitted via Portal attachment   Pending Reference #: [id number]     Care Manager notified: Nicole Garcia    Updates to authorization status will be noted in chart. Please reach out to CM for updates on any clinical information.

## 2024-12-09 NOTE — PLAN OF CARE
Problem: Potential for Falls  Goal: Patient will remain free of falls  Description: INTERVENTIONS:  - Educate patient/family on patient safety including physical limitations  - Instruct patient to call for assistance with activity   - Consult OT/PT to assist with strengthening/mobility   - Keep Call bell within reach  - Keep bed low and locked with side rails adjusted as appropriate  - Keep care items and personal belongings within reach  - Initiate and maintain comfort rounds  - Make Fall Risk Sign visible to staff  - Offer Toileting every 2 Hours, in advance of need  - Initiate/Maintain bed alarm  - Obtain necessary fall risk management equipment  - Apply yellow socks and bracelet for high fall risk patients  - Consider moving patient to room near nurses station  Outcome: Progressing     Problem: Prexisting or High Potential for Compromised Skin Integrity  Goal: Skin integrity is maintained or improved  Description: INTERVENTIONS:  - Identify patients at risk for skin breakdown  - Assess and monitor skin integrity  - Assess and monitor nutrition and hydration status  - Monitor labs   - Assess for incontinence   - Turn and reposition patient  - Assist with mobility/ambulation  - Relieve pressure over bony prominences  - Avoid friction and shearing  - Provide appropriate hygiene as needed including keeping skin clean and dry  - Evaluate need for skin moisturizer/barrier cream  - Collaborate with interdisciplinary team   - Patient/family teaching  - Consider wound care consult   Outcome: Progressing     Problem: PAIN - ADULT  Goal: Verbalizes/displays adequate comfort level or baseline comfort level  Description: Interventions:  - Encourage patient to monitor pain and request assistance  - Assess pain using appropriate pain scale  - Administer analgesics based on type and severity of pain and evaluate response  - Implement non-pharmacological measures as appropriate and evaluate response  - Consider cultural and  social influences on pain and pain management  - Notify physician/advanced practitioner if interventions unsuccessful or patient reports new pain  Outcome: Progressing     Problem: INFECTION - ADULT  Goal: Absence or prevention of progression during hospitalization  Description: INTERVENTIONS:  - Assess and monitor for signs and symptoms of infection  - Monitor lab/diagnostic results  - Monitor all insertion sites, i.e. indwelling lines, tubes, and drains  - Monitor endotracheal if appropriate and nasal secretions for changes in amount and color  - [place] appropriate cooling/warming therapies per order  - Administer medications as ordered  - Instruct and encourage patient and family to use good hand hygiene technique  - Identify and instruct in appropriate isolation precautions for identified infection/condition  Outcome: Progressing    Goal: Maintain or return to baseline ADL function  Description: INTERVENTIONS:  -  Assess patient's ability to carry out ADLs; assess patient's baseline for ADL function and identify physical deficits which impact ability to perform ADLs (bathing, care of mouth/teeth, toileting, grooming, dressing, etc.)  - Assess/evaluate cause of self-care deficits   - Assess range of motion  - Assess patient's mobility; develop plan if impaired  - Assess patient's need for assistive devices and provide as appropriate  - Encourage maximum independence but intervene and supervise when necessary  - Involve family in performance of ADLs  - Assess for home care needs following discharge   - Consider OT consult to assist with ADL evaluation and planning for discharge  - Provide patient education as appropriate  Outcome: Progressing  Goal: Maintains/Returns to pre admission functional level  Description: INTERVENTIONS:  - Perform AM-PAC 6 Click Basic Mobility/ Daily Activity assessment daily.  - Set and communicate daily mobility goal to care team and patient/family/caregiver.   - Collaborate with  rehabilitation services on mobility goals if consulted  - Perform Range of Motion 3 times a day.  - Reposition patient every 2 hours.  - Dangle patient 3 times a day  - Stand patient 3 times a day  - Ambulate patient 3 times a day  - Out of bed to chair 3 times a day   - Out of bed for meals 3 times a day  - Out of bed for toileting  - Record patient progress and toleration of activity level   Outcome: Progressing     Problem: DISCHARGE PLANNING  Goal: Discharge to home or other facility with appropriate resources  Description: INTERVENTIONS:  - Identify barriers to discharge w/patient and caregiver  - Arrange for needed discharge resources and transportation as appropriate  - Identify discharge learning needs (meds, wound care, etc.)  - Arrange for interpretive services to assist at discharge as needed  - Refer to Case Management Department for coordinating discharge planning if the patient needs post-hospital services based on physician/advanced practitioner order or complex needs related to functional status, cognitive ability, or social support system  Outcome: Progressing     Problem: Knowledge Deficit  Goal: Patient/family/caregiver demonstrates understanding of disease process, treatment plan, medications, and discharge instructions  Description: Complete learning assessment and assess knowledge base.  Interventions:  - Provide teaching at level of understanding  - Provide teaching via preferred learning methods  Outcome: Progressing

## 2024-12-09 NOTE — ASSESSMENT & PLAN NOTE
Lab Results   Component Value Date    HGBA1C 7.4 (H) 10/24/2024       Recent Labs     12/08/24  1616 12/08/24  2053 12/09/24  0715 12/09/24  1116   POCGLU 278* 187* 184* 211*       Blood Sugar Average: Last 72 hrs:  (P) 182.3125  Continue to hold metformin and Januvia from home  SSI  Diabetic diet  Continue Lyrica for polyneuropathy.

## 2024-12-09 NOTE — PROGRESS NOTES
Patient:  BONIFACIO LEMUS    MRN:  [id number]    Teddy Request ID:  [id number]    Level of care reserved:  Inpatient Rehab Facility    Partner Reserved:  [facility] ([place]), [place] [id number] [phone]    Clinical needs requested:    Geography searched:  50 miles around [phone]    Start of Service:    Request sent:  11:54am EST on 12/7/2024 by Zuly Sanchez    Partner reserved:  1:08pm EST on 12/9/2024 by Nicole Garcia    Choice list shared:  11:59am EST on 12/9/2024 by Nicole Garcia

## 2024-12-09 NOTE — ASSESSMENT & PLAN NOTE
Anesthesiology reported patient had a slightly wider QRS complex postop and recommended telemetry monitoring and Echocardiogram prior to discharge  Continue telemetry  Echo- pending read   PN

## 2024-12-09 NOTE — OCCUPATIONAL THERAPY NOTE
Occupational Therapy Evaluation     Patient Name: Lona Cedeno  Today's Date: 2024  Problem List  Principal Problem:    Closed left hip fracture, initial encounter ([facility])  Active Problems:    Type 2 diabetes mellitus with diabetic polyneuropathy, without long-term current use of insulin ([facility])    Essential hypertension    Anxiety and depression    PAD (peripheral artery disease) ([facility])    COPD, severity to be determined ([facility])    Hyperkalemia    Hyponatremia    Acute respiratory insufficiency    Preoperative clearance    SIRS (systemic inflammatory response syndrome) ([facility])    Urinary retention    Abnormal EKG    Past Medical History  Past Medical History:   Diagnosis Date    Anxiety     Closed fracture of coccyx ([facility]) 2023    COPD (chronic obstructive pulmonary disease) ([facility])     Diabetes (HCC)     Diabetes mellitus (HCC)     GERD (gastroesophageal reflux disease)     Hyperlipidemia     Hypertension     IBS (irritable bowel syndrome)     Peripheral arterial disease ([facility])     Shoulder fracture     Tobacco use      Past Surgical History  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;  Surgeon: Cristian Child MD;  Location: [facility];  Service: Urology    EVACUATION OF HEMATOMA Right 2024    Procedure: EVACUATION/ DRAINAGE CHEST WALL  HEMATOMA;  Surgeon: Seth Hoyos MD;  [facility]: [facility];  Service: Vascular    FL RETROGRADE PYELOGRAM  2023    HERNIA REPAIR      umbilicus w/ mesh    NE BYPASS W/VEIN  AXILLARY-FEMORAL Right 1/31/2024    Procedure: BYPASS AXILLO-FEMORAL, RIGHT WITH 8MM RINGED PTFE GRAFT;  Surgeon: Seth Hoyos MD;  [facility];  Service: Vascular    MI OPTX FEM SHFT FX W/INSJ IMED IMPLT W/WO SCREW Left 12/6/2024    Procedure: INSERTION NAIL IM FEMUR ANTEGRADE (TROCHANTERIC);  Surgeon: Rigo Prieto MD;  Location: [facility];  Service: Orthopedics    SPLIT THICKNESS SKIN GRAFT Right 2/28/2024    Procedure: SKIN GRAFT SPLIT THICKNESS (STSG)  EXTREMITY, Wound vac dressing change;  Surgeon: Rigo Yeboah DPM;  [facility];  Service: Podiatry    WOUND DEBRIDEMENT Right 2/4/2024    Procedure: RIGHT WOUND AND ACHILLES DEBRIDEMENT FOOT/TOE (WASH OUT); PARTIAL AMPUTATION DISTAL 2ND TOE;  Surgeon: Aaron Montero DPM;  [facility];  Service: Podiatry    WOUND DEBRIDEMENT Right 2/14/2024    Procedure: DEBRIDEMENT RIGHT GROIN  WOUND AND WASHOUT, APPLICATION OF WOUND VAC;  Surgeon: Suly Muro DO;  [facility];  Service: Vascular    WOUND DEBRIDEMENT Right 2/19/2024    Procedure: DEBRIDEMENT LOWER EXTREMITY, washout;  Surgeon: Suly Muro DO;  [facility];  Service: Vascular        12/09/24 0938   OT Last Visit   OT Visit Date 12/09/24   Note Type   Note type Evaluation   Additional Comments Pt seen as a co-eval with PT due to the patient's co-morbidities, clinically unstable presentation, and present impairments which are a regression from the patient's baseline.   Pain Assessment   Pain Assessment Tool 0-10   Pain Score 7   Pain Location/Orientation Orientation: Left;Location: Hip   Pain Radiating Towards n/a   Pain Onset/Description Onset: Ongoing   Effect of Pain on Daily Activities mobility   Patient's Stated Pain Goal No pain   Hospital Pain Intervention(s) Repositioned   Multiple Pain Sites No   Restrictions/Precautions   Weight Bearing Precautions Per Order Yes   LLE Weight Bearing Per Order WBAT   Braces or Orthoses   (none  "reported)   Other Precautions Hard of hearing;Chair Alarm;Bed Alarm;Fall Risk;Pain   Home Living   Type of Home House   Home Layout One level;Stairs to enter with rails;Performs ADLs on one level  (2 PATO)   Bathroom Shower/Tub Walk-in shower   Bathroom Toilet Raised   Bathroom Equipment Grab bars in shower;Shower chair   Bathroom Accessibility Accessible   Home Equipment Walker;Cane;Wheelchair-manual  (Pt reports utilizing SPC for household mobility; w/c for community mobility)   Prior Function   Level of [place] Independent with ADLs;Independent with functional mobility;Needs assistance with IADLS   Lives With Spouse   Receives Help From Family   IADLs Independent with driving;Independent with meal prep;Independent with medication management   Falls in the last 6 months 1 to 4   Vocational Retired   Lifestyle   Autonomy Pt resides in one level home w/ spouse; I with ADLs and IADLs at baseline; +    Reciprocal Relationships supportive family   Subjective   Subjective \"Am I helping you or are you pulling me up?\"   ADL   Where Assessed Edge of bed   Eating Assistance 6  Modified independent   Grooming Assistance 6  Modified Independent   UB Bathing Assistance 5  Supervision/Setup   LB Bathing Assistance 4  Minimal Assistance   UB Dressing Assistance 5  Supervision/Setup   LB Dressing Assistance 3  Moderate Assistance   LB Dressing Deficit Don/doff L sock;Don/doff R sock   Toileting Assistance  3  Moderate Assistance   Additional Comments Given functional performance skills + medical complexity, therapist suspects via clinical judgement + skilled analysis; pt currently requires stated assist above to perform each area of ADLs d/t limitations including: functional transfers, weight-bearing status, functional mobility, balance and overall coordination   Bed Mobility   Supine to Sit 3  Moderate assistance   Additional items Assist x 1;HOB elevated;Bedrails;Increased time required;Verbal cues;LE management   Sit " to Supine   (DNT; pt seated in recliner upon conclusion of session)   Additional Comments VC for bedrail utilization and proper body mechanics; denied dizziness with transitional movements   Transfers   Sit to Stand 3  Moderate assistance   Additional items Assist x 2;Increased time required;Verbal cues   Stand to Sit 4  Minimal assistance   Additional items Assist x 2;Increased time required;Verbal cues   Stand pivot 3  Moderate assistance   Additional items Assist x 2;Increased time required;Verbal cues   Additional Comments RW used; VC for safe hand placement, step sequence and RW management. x2 STS trials attempted; further mobility not tested after SPT d/t safety, fatigue and balance concerns   Balance   Static Sitting Good   Dynamic Sitting Fair +   Static Standing Fair   Dynamic Standing Fair -   Ambulatory Poor +   Activity Tolerance   Activity Tolerance Patient limited by pain;Patient limited by fatigue   Medical Staff Made Aware Yes, CM made aware of d/c recs   Nurse Made Aware Yes, nursing staff made aware of session outcomes   RUE Assessment   RUE Assessment WFL   RUE Strength   RUE Overall Strength   (3+/5)   LUE Assessment   LUE Assessment WFL   LUE Strength   LUE Overall Strength   (3+/5)   Hand Function   Gross Motor Coordination Functional   Fine Motor Coordination Functional   Psychosocial   Psychosocial (WDL) WDL   Cognition   Overall Cognitive Status WFL   Arousal/Participation Alert;Responsive;Cooperative   Attention Within functional limits   Orientation Level Oriented X4   Memory Within functional limits   Following Commands Follows all commands and directions without difficulty   Comments Pt agreeable to OT evaluation, pleasant   Assessment   Limitation Decreased ADL status;Decreased Safe judgement during ADL;Decreased UE strength;Decreased endurance;Decreased self-care trans;Decreased high-level ADLs   Prognosis Good   Assessment Pt is a 79 y.o. female seen for OT evaluation s/p admit to .  [facility] w/ Closed left hip fracture, initial encounter ([facility]).  Comorbidities affecting pt's functional performance at time of assessment include: type 2 diabetes, HTN, PAD, COPD, SIRS, GERD, osteoporosis. Personal factors affecting pt at time of IE include:steps to enter environment, difficulty performing ADLS, difficulty performing IADLS , decreased initiation and engagement , and health management . Prior to admission, pt was I with ADLs and IADLs. Upon evaluation: the following deficits impact occupational performance: weakness, decreased strength, decreased balance, decreased tolerance, decreased safety awareness, increased pain, and orthopedic restrictions. Pt to benefit from continued skilled OT tx while in the hospital to address deficits as defined above and maximize level of functional independence w ADL's and functional mobility. Occupational Performance areas to address include: grooming, bathing/shower, toilet hygiene, dressing, functional mobility, community mobility, and clothing management. From OT standpoint, recommendation at time of d/c would with max intensity OT resources.   Goals   Patient Goals to have less pain   Plan   Treatment Interventions ADL retraining;Functional transfer training;UE strengthening/ROM;Endurance training;Patient/family training;Compensatory technique education;Energy conservation;Activityengagement   Goal Expiration Date 12/19/24   OT Treatment Day 0   OT Frequency 3-5x/wk   Discharge Recommendation   Rehab Resource Intensity Level, OT I (Maximum Resource Intensity)   Additional Comments  The patient's raw score on the AM-PAC Daily Activity inpatient short form is 16, standardized score is 35.96, less than 39.4. Patients at this level are likely to benefit from discharge with max intensity OT resources. Please refer to the recommendation of the Occupational Therapist for safe discharge planning.   AM-PAC Daily Activity Inpatient   Lower Body Dressing 2    Bathing 2   Toileting 2   Upper Body Dressing 3   Grooming 3   Eating 4   Daily Activity Raw Score 16   Daily Activity Standardized Score (Calc for Raw Score >=11) 35.96   AM-PAC Applied Cognition Inpatient   Following a Speech/Presentation 3   Understanding Ordinary Conversation 4   Taking Medications 4   Remembering Where Things Are Placed or Put Away 3   Remembering List of 4-5 Errands 3   Taking Care of Complicated Tasks 3   Applied Cognition Raw Score 20   Applied Cognition Standardized Score 41.76     GOALS:    Pt will achieve the following within specified time frame: LTG  Pt will achieve the following goals within 10 days    *ADL transfers with (S) for inc'd independence with ADLs/purposeful tasks    *UB ADL with (I) for inc'd independence with self cares    *LB ADL with Min (A) using AE prn for inc'd independence with self cares    *Toileting with CGA for clothing management and hygiene for return to PLOF with personal care    *Increase static stand balance and dyn stand balance to F+ for inc'd safety with standing purposeful tasks    *Increase stand tolerance x7 m for inc'd tolerance with standing purposeful tasks    *Bed mobility- CGA for inc'd independence to manage own comfort and initiate EOB & OOB purposeful tasks    *Participate in 10-15m UE therex to increase overall stamina/activity tolerance for purposeful tasks      Shaneka Stark MS, OTR/L

## 2024-12-09 NOTE — PHYSICAL THERAPY NOTE
PT Treatment Note    NAME:  Lona Cedeno  DATE: 12/09/24    AGE:   79 y.o.  Mrn:   [id number]  ADMIT DX:  Closed fracture of left hip, initial encounter ([facility]) [S72.002A]  Closed left hip fracture, initial encounter ([facility]) [S72.002A]  Performed at least 2 patient identifiers during session: Name and ID bracelet         12/09/24 0959   PT Last Visit   PT Visit Date 12/09/24   Note Type   Note Type Treatment   Pain Assessment   Pain Assessment Tool 0-10   Pain Score 7   Pain Location/Orientation Orientation: Left;Location: Hip   Hospital Pain Intervention(s) Repositioned   Restrictions/Precautions   Weight Bearing Precautions Per Order Yes   LLE Weight Bearing Per Order WBAT   Braces or Orthoses   (none reported)   Other Precautions Hard of hearing;Chair Alarm;Bed Alarm;Fall Risk;Pain   General   Chart Reviewed Yes   Family/Caregiver Present No   Cognition   Overall Cognitive Status WFL   Arousal/Participation Alert;Responsive;Cooperative   Attention Within functional limits   Orientation Level Oriented X4   Memory Within functional limits   Following Commands Follows all commands and directions without difficulty   Bed Mobility   Supine to Sit 3  Moderate assistance   Additional items Assist x 1;HOB elevated;Bedrails;Increased time required;Verbal cues;LE management   Additional Comments VC for bedrail utilization and proper body mechanics; denied dizziness with transitional movements   Transfers   Sit to Stand 3  Moderate assistance   Additional items Assist x 2;Increased time required;Verbal cues   Stand to Sit 4  Minimal assistance   Additional items Assist x 2;Increased time required;Verbal cues   Stand pivot 3  Moderate assistance   Additional items Assist x 2;Increased time required;Verbal cues   Additional Comments pt denied dizziness with transitional movement  (pt not fully in front of chair prior to sitting)   Ambulation/Elevation   Gait pattern   (attempted ambulation however pt unable to take a step  with her RLE)   Balance   Static Sitting Good   Dynamic Sitting Fair +   Static Standing Fair   Dynamic Standing Poor +   Activity Tolerance   Activity Tolerance Patient limited by pain;Patient limited by fatigue   Exercises   Hip Flexion Sitting;15 reps;AROM;Bilateral   Hip Adduction Sitting;15 reps;AROM;Bilateral   Knee AROM Long Arc Quad Sitting;15 reps;AROM;Bilateral   Ankle Pumps Sitting;15 reps;AROM;Bilateral   Assessment   Prognosis Good   Assessment Pt seen for PT treatment session this date with interventions consisting of therapeutic exercise to improve strength to improve functional mobility and therapeutic activity to improve transfers and increase activity tolerance with functional mobility to decrease fall risk. Pt agreeable to PT treatment session upon arrival, pt found supine in bed, in no apparent distress. Since previous session, pt has made fair progress as evidenced by ability to mobilize OOB  Barriers during this session include pain and fatigue.  Pt continues to be functioning below baseline level, and remains limited 2* factors listed above and including decreased strength, impaired activity tolerance, impaired  balance, pain, decreased endurance, and decreased mobility.  Pt prognosis for achieving goals is good, pending pt progress with hospitalization/medical status improvements, and indicated by motivated to participate in therapy, ability to follow cues, and improvement with mobility status. PT will continue to see pt during current hospitalization in order to address the deficits listed above and provide interventions consistent w/ POC in effort to achieve goals. Current goals and POC remain appropriate, pt continues to have rehab potential  Upon conclusion pt seated OOB in recliner. The patient's AM-PAC Basic Mobility Inpatient Short Form Raw Score is 7.  A Raw score of less than or equal to 16 suggests the patient may benefit from discharge to post-acute rehabilitation services. Based  on patient presentations and impairments, pt would most appropriately benefit from Level 1 resource intensity upon discharge.  Please also refer to the recommendation of the Physical Therapist for safe discharge planning. RN verbalized pt appropriate for PT session. This session, pt required and most appropriately benefited from skilled OT/PT co-treat due to extensive physical assistance of two therapists to achieve transitional movements and decreased activity tolerance.   Barriers to Discharge Inaccessible home environment;Decreased caregiver support   Goals   Patient Goals to move better   STG Expiration Date 12/17/24   PT Treatment Day 2   Plan   Treatment/Interventions Functional transfer training;Therapeutic exercise;Bed mobility;Equipment eval/education   Progress Progressing toward goals   PT Frequency 4-6x/wk   Discharge Recommendation   Rehab Resource Intensity Level, PT I (Maximum Resource Intensity)   Equipment Recommended Walker   AM-PAC Basic Mobility Inpatient   Turning in Flat Bed Without Bedrails 2   Lying on Back to Sitting on Edge of Flat Bed Without Bedrails 1   Moving Bed to Chair 1   Standing Up From Chair Using Arms 1   Walk in Room 1   Climb 3-5 Stairs With Railing 1   Basic Mobility Inpatient Raw Score 7   Turning Head Towards Sound 4   Follow Simple Instructions 4   Low Function Basic Mobility Raw Score  15   Low Function Basic Mobility Standardized Score  23.9   [facility] Highest Level Of Mobility   JH-HLM Goal 2: Bed activities/Dependent transfer   JH-HLM Achieved 4: Move to chair/commode     Time In: 0934  Time Out: 0959  Total Treatment Minutes: 25    Thalia Cassidy, PT

## 2024-12-09 NOTE — OCCUPATIONAL THERAPY NOTE
"   12/09/24 1435   Note Type   Note Type Cancelled Session   Cancel Reasons Refusal     Attempted OT treatment today at 1435. Patient declined with report of \"feeling awful and not today.\"Will continue with POC to achieve max level of functional performance.  Sidra John    "

## 2024-12-09 NOTE — ASSESSMENT & PLAN NOTE
Lab Results   Component Value Date    HGBA1C 7.4 (H) 10/24/2024       Recent Labs     12/08/24  1616 12/08/24 2053 12/09/24  0715 12/09/24  1116   POCGLU 278* 187* 184* 211*       Blood Sugar Average: Last 72 hrs:  (P) 182.3121

## 2024-12-09 NOTE — PLAN OF CARE
Problem: OCCUPATIONAL THERAPY ADULT  Goal: Performs self-care activities at highest level of function for planned discharge setting.  See evaluation for individualized goals.  Description: Treatment Interventions: ADL retraining, Functional transfer training, UE strengthening/ROM, Endurance training, Patient/family training, Compensatory technique education, Energy conservation, Activityengagement     See flowsheet documentation for full assessment, interventions and recommendations.   Note: Limitation: Decreased ADL status, Decreased Safe judgement during ADL, Decreased UE strength, Decreased endurance, Decreased self-care trans, Decreased high-level ADLs  Prognosis: Good  Assessment: Pt is a 79 y.o. female seen for OT evaluation s/p admit to [facility] on 12/4/2024 w/ Closed left hip fracture, initial encounter (HCC).  Comorbidities affecting pt's functional performance at time of assessment include: type 2 diabetes, HTN, PAD, COPD, SIRS, GERD, osteoporosis. Personal factors affecting pt at time of IE include:steps to enter environment, difficulty performing ADLS, difficulty performing IADLS , decreased initiation and engagement , and health management . Prior to admission, pt was I with ADLs and IADLs. Upon evaluation: the following deficits impact occupational performance: weakness, decreased strength, decreased balance, decreased tolerance, decreased safety awareness, increased pain, and orthopedic restrictions. Pt to benefit from continued skilled OT tx while in the hospital to address deficits as defined above and maximize level of functional independence w ADL's and functional mobility. Occupational Performance areas to address include: grooming, bathing/shower, toilet hygiene, dressing, functional mobility, community mobility, and clothing management. From OT standpoint, recommendation at time of d/c would with max intensity OT resources.     Rehab Resource Intensity Level, OT: I (Maximum Resource  Intensity)     Shaneka Stark OTR/L

## 2024-12-09 NOTE — PROGRESS NOTES
Progress Note - Hospitalist   Name: Lona Cedeno 79 y.o. female I MRN: [id number]  Unit/Bed#: MS Silver-01 I Date of Admission: 12/4/2024   Date of Service: 12/9/2024 I Hospital Day: 5    Assessment & Plan  Closed left hip fracture, initial encounter (HCC)  The patient presented after a fall from standing at home on morning of admission.  She reports that she lost her balance and landed on her left side.  Imaging shows comminuted nondisplaced intertrochanteric fracture left proximal femur   Trauma work-up is negative in ED except left hip fracture.   CK normalized with IV fluids   Orthopedics input appreciated.   S/p left hip IMN on 12/6/2025  Restarted aspirin and Xarelto 12/7  Pain management  PT/OT recommending rehab  CM placed referrals- patient accepted at [facility]- insurance auth started  Type 2 diabetes mellitus with diabetic polyneuropathy, without long-term current use of insulin ([facility])  Lab Results   Component Value Date    HGBA1C 7.4 (H) 10/24/2024       Recent Labs     12/08/24  1616 12/08/24  2053 12/09/24  0715 12/09/24  1116   POCGLU 278* 187* 184* 211*       Blood Sugar Average: Last 72 hrs:  (P) 182.3125  Continue to hold metformin and Januvia from home  SSI  Diabetic diet  Continue Lyrica for polyneuropathy.     Essential hypertension  BP elevated today.  Will restart lisinopril   Continue to hold amiloride    Monitor BP closely and re-initiate medications as appropriate  Anxiety and depression  Continue with Wellbutrin, clonazepam and Lyrica   PAD (peripheral artery disease) ([facility])  On aspirin 81 mg daily, Xarelto 2.5 mg twice daily and statin   ASA and xarelto held for surgery- restarted 12/7  Continue statin    COPD, severity to be determined ([facility])  In no acute exacerbation  Continue with Breo and as needed albuterol   Hyperkalemia  Continue to hold amiloride for now  Low potassium diet  Resolved- continue to monitor BMP   Hyponatremia  Likely hyponatremia hypovolemia given that she  was on the floor for a while with decreased p.o. intake  Received gentle IV fluids   Resolved- Monitor BMP   Acute respiratory insufficiency  Noted SpO2 of 86% on room air in the ED.   The patient chronically wears 2 L nasal cannula intermittently at home   Cxr (12/4)- no acute cardiopulmonary finding  Noted increase O2 requirement to 4L NC, now down to 3 L   Repeat cxr negaitve; possibly related to atelectasis  Continue with oxygen supplement  Monitor to keep SpO2 greater than 88%.  Incentive spirometry, cough and deep breathing   SIRS (systemic inflammatory response syndrome) (HCC)  Noted to have WBC of 16.73, HR 91, and tachypnea   Suspect that SIRS criteria is likely due to recent fall with left hip fracture. No acute sign of infection at this time.   UA- negative   Will monitor off antibiotic  SIRS parameters resolved  Urinary retention  Likely due to acute left hip fracture and nonambulatory status  UA- negative   Will hold oxybutynin for now  Continue with urinary retention protocol  Abnormal EKG  Anesthesiology reported patient had a slightly wider QRS complex postop and recommended telemetry monitoring and Echocardiogram prior to discharge  Continue telemetry  Echo- pending read    VTE Pharmacologic Prophylaxis: VTE Score: 9 High Risk (Score >/= 5) - Pharmacological DVT Prophylaxis Ordered: rivaroxaban (Xarelto). Sequential Compression Devices Ordered.    Mobility:   Basic Mobility Inpatient Raw Score: 11  JH-HLM Goal: 4: Move to chair/commode  JH-HLM Achieved: 4: Move to chair/commode  JH-HLM Goal achieved. Continue to encourage appropriate mobility.    Patient Centered Rounds: I performed bedside rounds with nursing staff today.   Discussions with Specialists or Other Care Team Provider: nursing, CM    Education and Discussions with Family / Patient: Patient declined call to .     Current Length of Stay: 5 day(s)  Current Patient Status: Inpatient   Certification Statement: The patient will  continue to require additional inpatient hospital stay due to need for rehab placement  Discharge Plan: Anticipate discharge in 24-48 hrs to rehab facility.    Code Status: Level 3 - DNAR and DNI    Subjective   The patient was seen and examined. The patient is lying in bed in no acute distress. She reports continued left hip pain.     Objective :  Temp:  [97.8 °F (36.6 °C)-98 °F (36.7 °C)] 97.8 °F (36.6 °C)  HR:  [83-99] 99  BP: (121-154)/(61-68) 154/61  Resp:  [16-18] 16  SpO2:  [89 %-93 %] 93 %  O2 Device: Nasal cannula  Nasal Cannula O2 Flow Rate (L/min):  [2 L/min] 2 L/min    Body mass index is 34.58 kg/m².     Input and Output Summary (last 24 hours):     Intake/Output Summary (Last 24 hours) at 12/9/2024 1530  Last data filed at 12/9/2024 1315  Gross per 24 hour   Intake 1320 ml   Output 950 ml   Net 370 ml       Physical Exam  Vitals and nursing note reviewed.   Constitutional:       General: She is awake.      Appearance: Normal appearance.   HENT:      Head: Normocephalic and atraumatic.   Cardiovascular:      Rate and Rhythm: Normal rate and regular rhythm.      Heart sounds: Normal heart sounds.   Pulmonary:      Effort: Pulmonary effort is normal.      Breath sounds: Normal breath sounds.   Abdominal:      Palpations: Abdomen is soft.      Tenderness: There is no abdominal tenderness.   Skin:     General: Skin is warm and dry.   Neurological:      General: No focal deficit present.      Mental Status: She is alert and oriented to person, place, and time.   Psychiatric:         Attention and Perception: Attention normal.         Mood and Affect: Mood normal.         Speech: Speech normal.         Behavior: Behavior is cooperative.           Lines/Drains:              Lab Results: I have reviewed the following results:   Results from last 7 days   Lab Units 12/09/24  0527 12/05/24  0531 12/04/24  1457   WBC Thousand/uL 6.97   < > 16.73*   HEMOGLOBIN g/dL 10.2*   < > 13.0   HEMATOCRIT % 33.3*   < > 41.1    PLATELETS Thousands/uL 260   < > 260   SEGS PCT %  --   --  86*   LYMPHO PCT %  --   --  5*   MONO PCT %  --   --  7   EOS PCT %  --   --  1    < > = values in this interval not displayed.     Results from last 7 days   Lab Units 12/09/24  0527 12/04/24  1748 12/04/24  1457   SODIUM mmol/L 138   < > 131*   POTASSIUM mmol/L 4.5   < > 5.6*   CHLORIDE mmol/L 101   < > 98   CO2 mmol/L 32   < > 27   BUN mg/dL 11   < > 18   CREATININE mg/dL 0.51*   < > 0.73   ANION GAP mmol/L 5   < > 6   CALCIUM mg/dL 9.2   < > 10.2   ALBUMIN g/dL  --   --  4.4   TOTAL BILIRUBIN mg/dL  --   --  0.65   ALK PHOS U/L  --   --  60   ALT U/L  --   --  16   AST U/L  --   --  31   GLUCOSE RANDOM mg/dL 217*   < > 147*    < > = values in this interval not displayed.     Results from last 7 days   Lab Units 12/04/24  1457   INR  1.14     Results from last 7 days   Lab Units 12/09/24  1116 12/09/24  0715 12/08/24  2053 12/08/24  1616 12/08/24  1121 12/08/24  0714 12/07/24  2106 12/07/24  1650 12/07/24  1145 12/07/24  0751 12/06/24  2041 12/06/24  1911   POC GLUCOSE mg/dl 211* 184* 187* 278* 200* 148* 230* 167* 194* 158* 255* 176*               Recent Cultures (last 7 days):         Imaging Results Review: No pertinent imaging studies reviewed.  Other Study Results Review: No additional pertinent studies reviewed.    Last 24 Hours Medication List:     Current Facility-Administered Medications:     acetaminophen (TYLENOL) tablet 975 mg, Q8H JILLIAN    albuterol (PROVENTIL HFA,VENTOLIN HFA) inhaler 2 puff, Q6H PRN    ammonium lactate (LAC-HYDRIN) 12 % lotion, Daily    aspirin (ECOTRIN LOW STRENGTH) EC tablet 81 mg, Daily    atorvastatin (LIPITOR) tablet 10 mg, Daily With Dinner    buPROPion (WELLBUTRIN XL) 24 hr tablet 300 mg, Daily    cefadroxil (DURICEF) capsule 1,000 mg, Q12H JILLIAN    clonazePAM (KlonoPIN) tablet 1 mg, QPM    cyanocobalamin (VITAMIN B-12) tablet 1,000 mcg, Daily    docusate sodium (COLACE) capsule 100 mg, BID    Fluticasone  Furoate-Vilanterol 100-25 mcg/actuation 1 puff, Daily    insulin lispro (HumALOG/ADMELOG) 100 units/mL subcutaneous injection 1-6 Units, TID AC **AND** Fingerstick Glucose (POCT), TID AC    insulin lispro (HumALOG/ADMELOG) 100 units/mL subcutaneous injection 1-6 Units, HS    lisinopril (ZESTRIL) tablet 5 mg, Daily    morphine injection 2 mg, Q4H PRN    nicotine (NICODERM CQ) 21 mg/24 hr TD 24 hr patch 1 patch, Daily    ondansetron (ZOFRAN) injection 4 mg, Q6H PRN    oxyCODONE (ROXICODONE) immediate release tablet 10 mg, Q4H PRN    oxyCODONE (ROXICODONE) IR tablet 5 mg, Q4H PRN    pantoprazole (PROTONIX) EC tablet 40 mg, Daily    polyethylene glycol (MIRALAX) packet 17 g, Daily    pregabalin (LYRICA) capsule 200 mg, TID    rivaroxaban (XARELTO) tablet 2.5 mg, BID With Meals    Administrative Statements   Today, Patient Was Seen By: Nate Burns PA-C  I have spent a total time of 35 minutes in caring for this patient on the day of the visit/encounter including Documenting in the medical record, Reviewing / ordering tests, medicine, procedures  , and Obtaining or reviewing history  .    **Please Note: This note may have been constructed using a voice recognition system.**   discharged by MD

## 2024-12-09 NOTE — ASSESSMENT & PLAN NOTE
Patient is POD3 s/p left hip IMN    - WBAT  - Abx: duricef 500 mg bid for 5 days  - DVT ppx: home eliquis and ASA today  - PT/OT  - Pain control per primary team  - Medical management per primary team  - Case management consult for dispo planning

## 2024-12-10 PROBLEM — I42.9 CARDIOMYOPATHY (HCC): Status: ACTIVE | Noted: 2024-12-10

## 2024-12-10 LAB
ANION GAP SERPL CALCULATED.3IONS-SCNC: 3 MMOL/L (ref 4–13)
BUN SERPL-MCNC: 12 MG/DL (ref 5–25)
CALCIUM SERPL-MCNC: 9 MG/DL (ref 8.4–10.2)
CHLORIDE SERPL-SCNC: 100 MMOL/L (ref 96–108)
CO2 SERPL-SCNC: 35 MMOL/L (ref 21–32)
CREAT SERPL-MCNC: 0.47 MG/DL (ref 0.6–1.3)
ERYTHROCYTE [DISTWIDTH] IN BLOOD BY AUTOMATED COUNT: 14.2 % (ref 11.6–15.1)
GFR SERPL CREATININE-BSD FRML MDRD: 94 ML/MIN/1.73SQ M
GLUCOSE SERPL-MCNC: 192 MG/DL (ref 65–140)
GLUCOSE SERPL-MCNC: 205 MG/DL (ref 65–140)
GLUCOSE SERPL-MCNC: 213 MG/DL (ref 65–140)
GLUCOSE SERPL-MCNC: 239 MG/DL (ref 65–140)
GLUCOSE SERPL-MCNC: 259 MG/DL (ref 65–140)
HCT VFR BLD AUTO: 33.6 % (ref 34.8–46.1)
HGB BLD-MCNC: 10.6 G/DL (ref 11.5–15.4)
MCH RBC QN AUTO: 30.1 PG (ref 26.8–34.3)
MCHC RBC AUTO-ENTMCNC: 31.5 G/DL (ref 31.4–37.4)
MCV RBC AUTO: 96 FL (ref 82–98)
PLATELET # BLD AUTO: 286 THOUSANDS/UL (ref 149–390)
PMV BLD AUTO: 9.4 FL (ref 8.9–12.7)
POTASSIUM SERPL-SCNC: 4.1 MMOL/L (ref 3.5–5.3)
RBC # BLD AUTO: 3.52 MILLION/UL (ref 3.81–5.12)
SODIUM SERPL-SCNC: 138 MMOL/L (ref 135–147)
WBC # BLD AUTO: 7.5 THOUSAND/UL (ref 4.31–10.16)

## 2024-12-10 PROCEDURE — 99222 1ST HOSP IP/OBS MODERATE 55: CPT | Performed by: INTERNAL MEDICINE

## 2024-12-10 PROCEDURE — 99232 SBSQ HOSP IP/OBS MODERATE 35: CPT | Performed by: INTERNAL MEDICINE

## 2024-12-10 PROCEDURE — 97110 THERAPEUTIC EXERCISES: CPT

## 2024-12-10 PROCEDURE — 85027 COMPLETE CBC AUTOMATED: CPT | Performed by: PHYSICIAN ASSISTANT

## 2024-12-10 PROCEDURE — 80048 BASIC METABOLIC PNL TOTAL CA: CPT | Performed by: STUDENT IN AN ORGANIZED HEALTH CARE EDUCATION/TRAINING PROGRAM

## 2024-12-10 PROCEDURE — 82948 REAGENT STRIP/BLOOD GLUCOSE: CPT

## 2024-12-10 PROCEDURE — 97116 GAIT TRAINING THERAPY: CPT

## 2024-12-10 PROCEDURE — 99024 POSTOP FOLLOW-UP VISIT: CPT | Performed by: STUDENT IN AN ORGANIZED HEALTH CARE EDUCATION/TRAINING PROGRAM

## 2024-12-10 PROCEDURE — 97530 THERAPEUTIC ACTIVITIES: CPT

## 2024-12-10 RX ORDER — METOPROLOL SUCCINATE 25 MG/1
12.5 TABLET, EXTENDED RELEASE ORAL DAILY
Status: DISCONTINUED | OUTPATIENT
Start: 2024-12-10 | End: 2024-12-11 | Stop reason: HOSPADM

## 2024-12-10 RX ORDER — INSULIN GLARGINE 100 [IU]/ML
5 INJECTION, SOLUTION SUBCUTANEOUS
Status: DISCONTINUED | OUTPATIENT
Start: 2024-12-10 | End: 2024-12-11 | Stop reason: HOSPADM

## 2024-12-10 RX ADMIN — PREGABALIN 200 MG: 100 CAPSULE ORAL at 08:05

## 2024-12-10 RX ADMIN — CEFADROXIL 1000 MG: 500 CAPSULE ORAL at 21:14

## 2024-12-10 RX ADMIN — ATORVASTATIN CALCIUM 10 MG: 10 TABLET, FILM COATED ORAL at 17:41

## 2024-12-10 RX ADMIN — OXYCODONE HYDROCHLORIDE 10 MG: 10 TABLET ORAL at 06:15

## 2024-12-10 RX ADMIN — Medication: at 08:05

## 2024-12-10 RX ADMIN — PREGABALIN 200 MG: 100 CAPSULE ORAL at 17:41

## 2024-12-10 RX ADMIN — INSULIN LISPRO 3 UNITS: 100 INJECTION, SOLUTION INTRAVENOUS; SUBCUTANEOUS at 21:15

## 2024-12-10 RX ADMIN — ACETAMINOPHEN 975 MG: 325 TABLET ORAL at 21:14

## 2024-12-10 RX ADMIN — LISINOPRIL 5 MG: 5 TABLET ORAL at 08:05

## 2024-12-10 RX ADMIN — INSULIN LISPRO 2 UNITS: 100 INJECTION, SOLUTION INTRAVENOUS; SUBCUTANEOUS at 17:41

## 2024-12-10 RX ADMIN — CLONAZEPAM 1 MG: 1 TABLET ORAL at 17:42

## 2024-12-10 RX ADMIN — INSULIN LISPRO 2 UNITS: 100 INJECTION, SOLUTION INTRAVENOUS; SUBCUTANEOUS at 08:06

## 2024-12-10 RX ADMIN — RIVAROXABAN 2.5 MG: 2.5 TABLET, FILM COATED ORAL at 08:06

## 2024-12-10 RX ADMIN — ACETAMINOPHEN 975 MG: 325 TABLET ORAL at 13:00

## 2024-12-10 RX ADMIN — DOCUSATE SODIUM 100 MG: 100 CAPSULE, LIQUID FILLED ORAL at 08:05

## 2024-12-10 RX ADMIN — NICOTINE 1 PATCH: 21 PATCH, EXTENDED RELEASE TRANSDERMAL at 08:06

## 2024-12-10 RX ADMIN — CEFADROXIL 1000 MG: 500 CAPSULE ORAL at 08:05

## 2024-12-10 RX ADMIN — METOPROLOL SUCCINATE 12.5 MG: 25 TABLET, EXTENDED RELEASE ORAL at 12:57

## 2024-12-10 RX ADMIN — ASPIRIN 81 MG: 81 TABLET, COATED ORAL at 08:05

## 2024-12-10 RX ADMIN — FLUTICASONE FUROATE AND VILANTEROL TRIFENATATE 1 PUFF: 100; 25 POWDER RESPIRATORY (INHALATION) at 08:06

## 2024-12-10 RX ADMIN — PANTOPRAZOLE SODIUM 40 MG: 40 TABLET, DELAYED RELEASE ORAL at 08:04

## 2024-12-10 RX ADMIN — ACETAMINOPHEN 975 MG: 325 TABLET ORAL at 05:22

## 2024-12-10 RX ADMIN — POLYETHYLENE GLYCOL 3350 17 G: 17 POWDER, FOR SOLUTION ORAL at 08:04

## 2024-12-10 RX ADMIN — RIVAROXABAN 2.5 MG: 2.5 TABLET, FILM COATED ORAL at 17:41

## 2024-12-10 RX ADMIN — INSULIN GLARGINE 5 UNITS: 100 INJECTION, SOLUTION SUBCUTANEOUS at 21:15

## 2024-12-10 RX ADMIN — BUPROPION HYDROCHLORIDE 300 MG: 150 TABLET, EXTENDED RELEASE ORAL at 08:05

## 2024-12-10 RX ADMIN — PREGABALIN 200 MG: 100 CAPSULE ORAL at 21:14

## 2024-12-10 RX ADMIN — OXYCODONE HYDROCHLORIDE 10 MG: 10 TABLET ORAL at 19:14

## 2024-12-10 RX ADMIN — DOCUSATE SODIUM 100 MG: 100 CAPSULE, LIQUID FILLED ORAL at 17:42

## 2024-12-10 RX ADMIN — CYANOCOBALAMIN TAB 500 MCG 1000 MCG: 500 TAB at 08:05

## 2024-12-10 NOTE — ASSESSMENT & PLAN NOTE
-Newly discovered with LVEF approximately 35% regional variations  -Discussed with patient recommendations for ischemic evaluation given underlying disease and risk factors.  -At this time patient does not wish to undergo invasive or aggressive treatments or strategies including but not limited to cardiac catheterization and/or ICD placement if she were to require or if ejection fraction does not improve.  -She is agreeable to medical therapy and therefore we will uptitrate medical therapy as patient tolerates by continuing lisinopril 5 mg daily and initiating metoprolol succinate 12.5 mg daily  -Patient with previous issues of hyperkalemia and also documented urinary retention which may make aldosterone antagonist and SGLT2 inhibitor therapy more difficult.  -Counseled patient on need for sodium and fluid restricted diet along with need for tobacco cessation  -Continue to monitor

## 2024-12-10 NOTE — CASE MANAGEMENT
Support Center has received DENIAL for Acute Rehab Authorization.   Insurance: Ann  Denial obtained via Insurance Rep:   Denial Reason: does not meet CMS guidelines  Facility: [facility]   Denial #: [id number]   Peer to Peer Phone#: [phone] Deadline: 4/11 @ 4p  CM to task P2P to Attending to complete if desired.  Courtesy [facility] auth #: [id number]    Please notify Discharge Support if P2P will not be completed.     Care Manager notified: Nicole Garcia    Please reach out to CM for updates on any clinical information.

## 2024-12-10 NOTE — PLAN OF CARE
Problem: OCCUPATIONAL THERAPY ADULT  Goal: Performs self-care activities at highest level of function for planned discharge setting.  See evaluation for individualized goals.  Description: Treatment Interventions: ADL retraining, Functional transfer training, UE strengthening/ROM, Endurance training, Patient/family training, Compensatory technique education, Energy conservation, Activityengagement          See flowsheet documentation for full assessment, interventions and recommendations.   Outcome: Progressing  Note: Limitation: Decreased ADL status, Decreased Safe judgement during ADL, Decreased UE strength, Decreased endurance, Decreased self-care trans, Decreased high-level ADLs  Prognosis: Good  Assessment: Patient participated in Skilled OT session this date with interventions consisting of functional transfer training, Energy Conservation techniques, therapeutic exercise to: increase functional use of BUEs, increase BUE muscle strength , and increase dynamic sit/ stand balance during functional activity  . Patient agreeable to OT treatment session, upon arrival patient was found seated OOB to Recliner, alert, responsive , and in no apparent distress. Patient performs UB TE using 1# weight x 15 reps all directions as detailed in flow sheet. Patient then transfers sit to stand with Min A x 2. Attempted to take steps however patient states she cannot advance R LE. Patient stood x 2 x 5 minutes both times. Patient declined further activity and session ended with patient seated OOB to recliner, all needs met, and call bell within reach. In comparison to previous session, patient with improvements in UB strength, endurance, and functional transfers. Patient requiring frequent re direction, verbal cues for safety, verbal cues for correct technique, and frequent rest periods. Patient performance  demonstrated good carryover of learned techniques and strategies to facilitate safety during functional tasks. Patient  continues to be functioning below baseline level, occupational performance remains limited secondary to factors listed above and increased risk for falls and injury.   From OT standpoint, recommendation at time of d/c would be Level I (Maximum Resource Intensity). Patient to benefit from continued Occupational Therapy treatment while in the hospital to address deficits as defined above and maximize level of functional independence with ADLs and functional mobility in order to return to PLOF.     Rehab Resource Intensity Level, OT: I (Maximum Resource Intensity)

## 2024-12-10 NOTE — PROGRESS NOTES
Patient:  BONIFACIO LEMUS    MRN:  [id number]    Brandonin Request ID:  [id number]    Level of care reserved:    Partner Reserved:    Clinical needs requested:    Geography searched:  30 miles around [id number]    Start of Service:    Request sent:  12:02pm EST on 12/7/2024 by Zuly Sanchez    Partner reserved:    Choice list shared:  9:32am EST on 12/9/2024 by Nicole Garcia

## 2024-12-10 NOTE — CASE MANAGEMENT
Received SNF information for courtesy SNF from [facility]. Submitted information on [employer].   SOC: 12/11  NRD: 2 business days  Facility info:   [facility] [facility] NPI: [id number]  Dr. Gamez NPI: [id number]  CM notified: Hina Marroquin

## 2024-12-10 NOTE — PLAN OF CARE
Problem: PHYSICAL THERAPY ADULT  Goal: Performs mobility at highest level of function for planned discharge setting.  See evaluation for individualized goals.  Description: Treatment/Interventions: Functional transfer training, Elevations, LE strengthening/ROM, Therapeutic exercise, Endurance training, Patient/family training, Equipment eval/education, Gait training, Bed mobility, Spoke to nursing  Equipment Recommended: Walker (RW)       See flowsheet documentation for full assessment, interventions and recommendations.  Outcome: Progressing  Note: Prognosis: Good  Problem List: Decreased strength, Decreased endurance, Impaired balance, Decreased mobility, Decreased range of motion, Orthopedic restrictions, Pain  Assessment: Pt seen for PT treatment session this date with interventions consisting of gait training to normalize gait pattern to decrease fall risk, therapeutic activity to educate patient on safe transfers to progress as able, and therapeutic exercise to improve strength to improve functional mobility. Pt agreeable to PT treatment session upon arrival, pt found seated OOB in recliner, in no apparent distress, A&O x 4, and responsive. Since previous session, pt has made fair progress as evidenced by decreased assistance required with mobility  Barriers during this session include pain and fear .  Pt continues to be functioning below baseline level, and remains limited 2* factors listed above and including decreased strength, impaired activity tolerance, impaired  balance, pain, and decreased ROM.  Pt prognosis for achieving goals is good, pending pt progress with hospitalization/medical status improvements, and indicated by motivated to participate in therapy and ability to follow cues. PT will continue to see pt during current hospitalization in order to address the deficits listed above and provide interventions consistent w/ POC in effort to achieve goals. Current goals and POC remain appropriate,  pt continues to have rehab potential  Upon conclusion pt seated OOB in recliner. The patient's AM-PAC Basic Mobility Inpatient Short Form Raw Score is 9.  A Raw score of less than or equal to 16 suggests the patient may benefit from discharge to post-acute rehabilitation services. Based on patient presentations and impairments, pt would most appropriately benefit from Level 1 resource intensity upon discharge.  Please also refer to the recommendation of the Physical Therapist for safe discharge planning. RN verbalized pt appropriate for PT session.  Barriers to Discharge: Inaccessible home environment, Decreased caregiver support     Rehab Resource Intensity Level, PT: I (Maximum Resource Intensity)    See flowsheet documentation for full assessment.

## 2024-12-10 NOTE — ASSESSMENT & PLAN NOTE
Lab Results   Component Value Date    HGBA1C 7.4 (H) 10/24/2024       Recent Labs     12/09/24  1607 12/09/24  2037 12/10/24  0734 12/10/24  1131   POCGLU 184* 174* 192* 259*       Blood Sugar Average: Last 72 hrs:  (P) 197.6746640476812971  Continue to hold metformin and Januvia from home  Start Lantus 5 U qhs   SSI  Diabetic diet  Continue Lyrica for polyneuropathy.

## 2024-12-10 NOTE — CONSULTS
Consultation - Cardiology   Name: Lona Cedeno 79 y.o. female I MRN: [id number]  Unit/Bed#: -01 I Date of Admission: 12/4/2024   Date of Service: 12/10/2024 I Hospital Day: 6   Inpatient consult to Cardiology  Consult performed by: Sebastian Austin DO  Consult ordered by: Nate Burns PA-C        Physician Requesting Evaluation: Charley Thomas, *   Reason for Evaluation / Principal Problem: Cardiomyopathy    Assessment & Plan  Closed left hip fracture, initial encounter (HCC)  -Primary reason for hospitalization s/p repair   -Presented after mechanical fall.  Cardiomyopathy (HCC)  -Newly discovered with LVEF approximately 35% regional variations  -Discussed with patient recommendations for ischemic evaluation given underlying disease and risk factors.  -At this time patient does not wish to undergo invasive or aggressive treatments or strategies including but not limited to cardiac catheterization and/or ICD placement if she were to require or if ejection fraction does not improve.  -She is agreeable to medical therapy and therefore we will uptitrate medical therapy as patient tolerates by continuing lisinopril 5 mg daily and initiating metoprolol succinate 12.5 mg daily  -Patient with previous issues of hyperkalemia and also documented urinary retention which may make aldosterone antagonist and SGLT2 inhibitor therapy more difficult.  -Counseled patient on need for sodium and fluid restricted diet along with need for tobacco cessation  -Continue to monitor  Type 2 diabetes mellitus with diabetic polyneuropathy, without long-term current use of insulin (HCC)  Lab Results   Component Value Date    HGBA1C 7.4 (H) 10/24/2024       Recent Labs     12/09/24  1116 12/09/24  1607 12/09/24  2037 12/10/24  0734   POCGLU 211* 184* 174* 192*       Blood Sugar Average: Last 72 hrs:  (P) [id number]  -Counseled on dietary and lifestyle modifications  -Plan of care per primary team    Essential  hypertension  -Blood pressures appears stable at this time  -Can continue lisinopril 5 mg daily and will attempt to add metoprolol succinate 12.5 mg daily given newly discovered cardiomyopathy  -Counseled patient on dietary lifestyle modifications including sodium and fluid restricted diet  -Continue to monitor  PAD (peripheral artery disease) (HCC)  -Followed by vascular in the outpatient setting s/p right axillary to femoral bypass with PTFE on 1/31/2024   -Continue medical management with aspirin 80 mg daily, Xarelto 2.5 mg twice daily and would uptitrate atorvastatin if patient tolerates  -Counseled on need for tobacco cessation however patient does not wish to stop at this time.  COPD, severity to be determined (HCC)  -Currently on 2 L nasal cannula oxygen  -Patient counseled on need for tobacco cessation  -Plan of care per primary team    Other summary comments:   -After discussion with patient she would not want any invasive or aggressive treatments or strategies but is agreeable to up titration of medical therapy.    -Will continue lisinopril 5 mg daily and add metoprolol succinate 12.5 mg daily to regimen and monitor response.  Patient had issue with hyperkalemia on admission and has issues with urinary retention which may make things like aldosterone antagonist and SGLT2 inhibitor therapy more difficult.  -Continue to monitor patient clinically.    Outpatient Cardiologist: Dr. Cespedes per patient, but she admits she has not seen him in a little while but has been meaning to make follow-up appointment.    HPI: Lona Cedeno is a 79 y.o. year old female with diabetes mellitus, PAD with history of intervention followed by vascular in the outpatient setting, hypertension, COPD and chronic tobacco use who presented to [facility] 12/4/2024 with mechanical fall from standing after patient lost her balance.  She denied any loss of consciousness, chest pain, palpitations prior to event.  She  subsequently was seen and evaluated by orthopedic surgery and underwent surgical intervention for left intertrochanteric hip fracture 12/6/2024.  Due to abnormal ECG at baseline transthoracic echocardiogram was requested and performed which showed newly discovered cardiomyopathy and is for this that cardiology was consulted.  -Currently at bedside patient denies any chest pain, palpitations, lightness or dizziness, loss of consciousness and notes she is at her chronic baseline shortness of breath.  She states overall she feels well with no significant orthopnea and has no active complaints apart from some persistent left hip pain.      EKG:   -Sinus tachycardia with fusion complexes, nonspecific interventricular conduction block with inability to rule out septal infarct age-indeterminate heart rate 109 bpm.    MOST  RECENT CARDIAC IMAGING:   -Transthoracic echocardiogram 12/9/2024 technically difficult study however overall left ventricular systolic function appears moderately reduced with regional variations particularly in the mid apical septum, mid to apical inferior and apex.  There was low normal right ventricular systolic function with trace tricuspid regurgitation, trace mitral regurgitation.        Review of Systems:   Review of Systems   Constitutional:  Negative for chills, diaphoresis, fatigue and fever.   HENT:  Negative for trouble swallowing and voice change.    Eyes:  Negative for pain and redness.   Respiratory:  Negative for shortness of breath and wheezing.    Cardiovascular:  Negative for chest pain, palpitations and leg swelling.   Gastrointestinal:  Negative for abdominal pain, blood in stool, constipation, diarrhea, nausea and vomiting.   Genitourinary:  Negative for dysuria.   Musculoskeletal:  Positive for arthralgias. Negative for neck pain and neck stiffness.   Skin:  Negative for rash.   Neurological:  Negative for dizziness, syncope, light-headedness and headaches.    Psychiatric/Behavioral:  Negative for agitation and hallucinations.    All other systems reviewed and are negative.       Historical Information   Past Medical History:   Diagnosis Date    Anxiety     Closed fracture of coccyx (HCC) 2023    COPD (chronic obstructive pulmonary disease) (HCC)     Diabetes (HCC)     Diabetes mellitus (HCC)     GERD (gastroesophageal reflux disease)     Hyperlipidemia     Hypertension     IBS (irritable bowel syndrome)     Peripheral arterial disease (HCC)     Shoulder fracture     Tobacco use      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;  Surgeon: Cristian Child MD;  Location: [facility];  Service: Urology    EVACUATION OF HEMATOMA Right 2024    Procedure: EVACUATION/ DRAINAGE CHEST WALL  HEMATOMA;  Surgeon: Seth Hoyos MD;  Location: [facility];  Service: Vascular    FL RETROGRADE PYELOGRAM  2023    HERNIA REPAIR      umbilicus w/ mesh    UT BYPASS W/VEIN AXILLARY-FEMORAL Right 2024    Procedure: BYPASS AXILLO-FEMORAL, RIGHT WITH 8MM RINGED PTFE GRAFT;  Surgeon: Seth Hoyos MD;  [facility];  Service: Vascular    UT OPTX FEM SHFT FX W/INSJ IMED IMPLT W/WO SCREW Left 2024    Procedure: INSERTION NAIL IM FEMUR ANTEGRADE (TROCHANTERIC);  Surgeon: Rigo Prieto MD;  Location: [facility];  Service: Orthopedics    SPLIT THICKNESS SKIN GRAFT Right 2024    Procedure: SKIN GRAFT SPLIT THICKNESS (STSG)  EXTREMITY, Wound vac dressing change;  Surgeon:  Rigo Yeboah DPM;  [facility];  Service: Podiatry    WOUND DEBRIDEMENT Right 2/4/2024    Procedure: RIGHT WOUND AND ACHILLES DEBRIDEMENT FOOT/TOE (WASH OUT); PARTIAL AMPUTATION DISTAL 2ND TOE;  Surgeon: Aaron Montero DPM;  [facility];  Service: Podiatry    WOUND DEBRIDEMENT Right 2/14/2024    Procedure: DEBRIDEMENT RIGHT GROIN  WOUND AND WASHOUT, APPLICATION OF WOUND VAC;  Surgeon: Suly Muro DO;  [facility];  Service: Vascular    WOUND DEBRIDEMENT Right 2/19/2024    Procedure: DEBRIDEMENT LOWER EXTREMITY, washout;  Surgeon: Suyl Muro DO;  [facility];  Service: Vascular     Social History     Substance and Sexual Activity   Alcohol Use Not Currently     Social History     Substance and Sexual Activity   Drug Use Never     Social History     Tobacco Use   Smoking Status Every Day    Current packs/day: 1.50    Average packs/day: 1 pack/day for 63.9 years (64.2 ttl pk-yrs)    Types: Cigarettes    Start date: 1961   Smokeless Tobacco Never   Tobacco Comments    Quit January 31, 2024       Family History:   Family History   Problem Relation Age of Onset    COPD Mother     Emphysema Mother     COPD Father     Emphysema Father         Meds/Allergies   all current active meds have been reviewed    Medications Prior to Admission:     acetaminophen (TYLENOL) 325 mg tablet    albuterol (Ventolin HFA) 90 mcg/act inhaler    ammonium lactate (LAC-HYDRIN) 12 % lotion    Aspirin (ASPIR-81 PO)    atorvastatin (LIPITOR) 10 mg tablet    buPROPion (WELLBUTRIN XL) 300 mg 24 hr tablet    clonazePAM (KlonoPIN) 1 mg tablet    cyanocobalamin (VITAMIN B-12) 1000 MCG tablet    Fluticasone-Salmeterol (Advair Diskus) 250-50 mcg/dose inhaler    Januvia 100 MG tablet    metFORMIN (GLUCOPHAGE) 1000 MG tablet    pantoprazole (PROTONIX) 40 mg tablet    pregabalin (LYRICA) 200 MG capsule    solifenacin (VESICARE) 5 mg tablet    Xarelto 2.5 MG tablet    AMILoride 5 mg tablet    Apoaequorin  "20 MG CAPS    Blood Glucose Monitoring Suppl (OneTouch Verio) w/Device KIT    Lancets (OneTouch Delica Plus [id number]) MISC    lisinopril (ZESTRIL) 5 mg tablet    Misc. Devices (Carex Coccyx Cushion) MISC    naloxone (NARCAN) 4 mg/0.1 mL nasal spray    OneTouch Verio test strip    polyethylene glycol (MIRALAX) 17 g packet    Allergies   Allergen Reactions    Paroxetine Other (See Comments)     Became suicidal    Adhesive [Medical Tape] Itching and Blisters    Carafate [Sucralfate] Other (See Comments)     Mouth sores, tongue sore    Sulfa Antibiotics Hives and Rash    Sulfamethoxazole-Trimethoprim Hives       Objective   Vitals: Blood pressure 119/55, pulse 87, temperature 98.3 °F (36.8 °C), resp. rate 20, height 5' 1\" (1.549 m), weight 83.6 kg (184 lb 4.9 oz), SpO2 (!) 89%., Body mass index is 34.82 kg/m².,   Orthostatic Blood Pressures      Flowsheet Row Most Recent Value   Blood Pressure 119/55 filed at 12/10/2024 0732   Patient Position - Orthostatic VS Sitting filed at 2024 1428            Systolic (24hrs), Av , Min:119 , Max:154     Diastolic (24hrs), Av, Min:55, Max:64      Physical Exam:  Physical Exam  Vitals reviewed.   Constitutional:       General: She is not in acute distress.     Appearance: She is obese. She is not diaphoretic.   HENT:      Head: Normocephalic and atraumatic.      Comments: Nasal cannula oxygen in place  Eyes:      General:         Right eye: No discharge.         Left eye: No discharge.   Neck:      Comments: Trachea midline, neck obese, difficult to assess JVD  Cardiovascular:      Rate and Rhythm: Normal rate and regular rhythm.      Heart sounds:      No friction rub.   Pulmonary:      Effort: No respiratory distress.      Breath sounds: No wheezing.      Comments: Decreased breath sounds bilaterally  Chest:      Chest wall: No tenderness.   Abdominal:      General: Bowel sounds are normal.      Palpations: Abdomen is soft.      Tenderness: There is no abdominal " tenderness. There is no rebound.   Musculoskeletal:      Right lower leg: No edema.      Left lower leg: No edema.   Skin:     General: Skin is warm.   Neurological:      Mental Status: She is alert.      Comments: Awake, alert, able to answer questions appropriately.   Psychiatric:         Mood and Affect: Mood normal.         Behavior: Behavior normal.          Lab Results:   Labs reviewed and prominent abnormalities reviewed above and/or below.    Troponins:      BNP:   Results from last 6 Months   Lab Units 07/16/24  1901   BNP pg/mL 22

## 2024-12-10 NOTE — CASE MANAGEMENT
Case Management Discharge Planning Note    Patient name Lona Cedeno  Location /-01 MRN [id number]  : 1945 Date 12/10/2024       Current Admission Date: 2024  Current Admission Diagnosis:Closed left hip fracture, initial encounter ([facility])   Patient Active Problem List    Diagnosis Date Noted Date Diagnosed    Abnormal EKG 2024     Urinary retention 2024     Closed left hip fracture, initial encounter ([facility]) 2024     Acute respiratory insufficiency 2024     Preoperative clearance 2024     SIRS (systemic inflammatory response syndrome) ([facility]) 2024     Acute encephalopathy 10/25/2024     Hyponatremia 10/24/2024     Open wound of right foot 2024     Hyperkalemia 2024     Acute metabolic encephalopathy 2024     Toxic encephalopathy 2024     Acute respiratory failure with hypoxia ([facility]) 2024     Sacral wound 2024     Other dietary vitamin B12 deficiency anemia 2024     Leukocytosis 2024     At risk for venous thromboembolism (VTE) 2024     At high risk for skin breakdown 2024     Wound of skin 2024     Impaired mobility and activities of daily living 2024     Anemia 2024     Constipation 2024     Age-related osteoporosis without current pathological fracture 2023     Abnormal CT of the chest 10/17/2023     COPD, severity to be determined ([facility]) 10/17/2023     Tobacco use disorder 10/17/2023     PAD (peripheral artery disease) ([facility]) 10/10/2023     Bladder neoplasm 2023     Left renal mass 2023     Hypomagnesemia 2023     Degenerative lumbar disc 2023     Urinary incontinence 2023     GERD without esophagitis 10/16/2019     Essential hypertension 02/15/2019     Anxiety and depression 02/15/2019     Type 2 diabetes mellitus with diabetic polyneuropathy, without long-term current use of insulin ([facility]) 2019       LOS (days): 6  Geometric Mean LOS  (GMLOS) (days): 4.4  Days to GMLOS:-1.3     OBJECTIVE:  Risk of Unplanned Readmission Score: 37.54         Current admission status: Inpatient   Preferred Pharmacy:   [id number] - [place] - [address]  [address] [address]  Phone: [phone] Fax: [phone]    [facility]  [place] - [address]  [address]   [place] [address]  Phone: [phone] Fax: [phone]    [facility] - [place], [place] - [address]  [address]  [place] [address]  Phone: [phone] Fax: [phone]    Primary Care Provider: Vivek Preston DO    Primary Insurance: [employer] [facility]  Secondary Insurance:     DISCHARGE DETAILS:    [facility] denied by insurer.  Provider notified and information for P2P provided    P2P denial upheld    Spoke with pt, advised [facility] was not approved.  Explained that [facility] was approved.  States she wants to go to [facility] in [place], advised insurance did not approve.  States she has Medicare.  Advised she has a Medicare Advantage Plan-Ann  Reviewed [facility] SNF facilities that accepted.  She is agreeable to [facility].  Reserved.  CM support notified    PT needs COVID test prior to DC

## 2024-12-10 NOTE — ASSESSMENT & PLAN NOTE
Lab Results   Component Value Date    HGBA1C 7.4 (H) 10/24/2024       Recent Labs     12/09/24  1116 12/09/24  1607 12/09/24  2037 12/10/24  0734   POCGLU 211* 184* 174* 192*       Blood Sugar Average: Last 72 hrs:  (P) [id number]  -Counseled on dietary and lifestyle modifications  -Plan of care per primary team     Hep B, adolescent or pediatric; 2023 06:15; Barbra Workman (RN); [employer] [id number] (Exp. Date: 11-Apr-2024); IntraMuscular; Vastus Lateralis Right.; 0.5 milliLiter(s); VIS (VIS Published: 15-Oct-2021, VIS Presented: 2023);

## 2024-12-10 NOTE — CASE MANAGEMENT
Rec'd call from Meghana @ [facility] ([phone]) who stated case being sent to MD for review. CM notified: Nicole Garcia

## 2024-12-10 NOTE — ASSESSMENT & PLAN NOTE
-Blood pressures appears stable at this time  -Can continue lisinopril 5 mg daily and will attempt to add metoprolol succinate 12.5 mg daily given newly discovered cardiomyopathy  -Counseled patient on dietary lifestyle modifications including sodium and fluid restricted diet  -Continue to monitor

## 2024-12-10 NOTE — ASSESSMENT & PLAN NOTE
Anesthesiology reported patient with slightly wider QRS complex and recommended telemetry monitoring and echocardiogram  Echo 12/9: EF 35%, hypokinesis of the mid to apical septum, mid to apical inferior, mid to apical anterior, apical lateral and apical walls   Cardiology consultation appreciated   Patient does not wish to undergo invasive or aggressive treatments/strategies  Continue home lisinopril 5 mg daily  Starting Metoprolol succinate 12.5 mg po daily  Outpatient follow-up with cardiology

## 2024-12-10 NOTE — PROGRESS NOTES
Progress Note - Hospitalist   Name: Lona Cedeno 79 y.o. female I MRN: [id number]  Unit/Bed#: MS Silver-01 I Date of Admission: 12/4/2024   Date of Service: 12/10/2024 I Hospital Day: 6    Assessment & Plan  Closed left hip fracture, initial encounter ([facility])  The patient presented after a fall from standing at home on morning of admission.  She reports that she lost her balance and landed on her left side.  Imaging shows comminuted nondisplaced intertrochanteric fracture left proximal femur   Trauma work-up is negative in ED except left hip fracture.   CK normalized with IV fluids   Orthopedics input appreciated.   S/p left hip IMN on 12/6/2025  Restarted aspirin and Xarelto 12/7  Pain management  PT/OT recommending rehab  CM placed referrals- patient accepted at [facility]- insurance denied- will need to due a [id number]  Cardiomyopathy ([facility])  Anesthesiology reported patient with slightly wider QRS complex and recommended telemetry monitoring and echocardiogram  Echo 12/9: EF 35%, hypokinesis of the mid to apical septum, mid to apical inferior, mid to apical anterior, apical lateral and apical walls   Cardiology consultation appreciated   Patient does not wish to undergo invasive or aggressive treatments/strategies  Continue home lisinopril 5 mg daily  Starting Metoprolol succinate 12.5 mg po daily  Outpatient follow-up with cardiology  Type 2 diabetes mellitus with diabetic polyneuropathy, without long-term current use of insulin ([facility])  Lab Results   Component Value Date    HGBA1C 7.4 (H) 10/24/2024       Recent Labs     12/09/24  1607 12/09/24  2037 12/10/24  0734 12/10/24  1131   POCGLU 184* 174* 192* 259*       Blood Sugar Average: Last 72 hrs:  (P) [id number]  Continue to hold metformin and Januvia from home  Start Lantus 5 U qhs   SSI  Diabetic diet  Continue Lyrica for polyneuropathy.     Essential hypertension  BP improved  Restart lisinopril 12/9  Continue to hold amiloride     Monitor BP closely and re-initiate medications as appropriate  Anxiety and depression  Continue with Wellbutrin, clonazepam and Lyrica   PAD (peripheral artery disease) ([facility])  On aspirin 81 mg daily, Xarelto 2.5 mg twice daily and statin   ASA and xarelto held for surgery- restarted 12/7  Continue statin    COPD, severity to be determined ([facility])  In no acute exacerbation  Continue with Breo and as needed albuterol   Hyperkalemia  Continue to hold amiloride for now  Low potassium diet  Resolved- continue to monitor BMP   Hyponatremia  Likely hyponatremia hypovolemia given that she was on the floor for a while with decreased p.o. intake  Received gentle IV fluids   Resolved- Monitor BMP   Acute respiratory insufficiency  Noted SpO2 of 86% on room air in the ED.   The patient chronically wears 2 L nasal cannula intermittently at home   Cxr (12/4)- no acute cardiopulmonary finding  Noted increase O2 requirement to 4L NC, now down to 3 L   Repeat cxr negaitve; possibly related to atelectasis  Continue with oxygen supplement  Monitor to keep SpO2 greater than 88%.  Incentive spirometry, cough and deep breathing   SIRS (systemic inflammatory response syndrome) ([facility])  Noted to have WBC of 16.73, HR 91, and tachypnea   Suspect that SIRS criteria is likely due to recent fall with left hip fracture. No acute sign of infection at this time.   UA- negative   Will monitor off antibiotic  SIRS parameters resolved  Urinary retention  Likely due to acute left hip fracture and nonambulatory status  UA- negative   Will hold oxybutynin for now  Continue with urinary retention protocol    VTE Pharmacologic Prophylaxis: VTE Score: 9 High Risk (Score >/= 5) - Pharmacological DVT Prophylaxis Ordered: rivaroxaban (Xarelto). Sequential Compression Devices Ordered.    Mobility:   Basic Mobility Inpatient Raw Score: 11  JH-HLM Goal: 4: Move to chair/commode  JH-HLM Achieved: 4: Move to chair/commode  JH-HLM Goal achieved. Continue to  encourage appropriate mobility.    Patient Centered Rounds: I performed bedside rounds with nursing staff today.   Discussions with Specialists or Other Care Team Provider: nursing, CM, cardiology      Current Length of Stay: 6 day(s)  Current Patient Status: Inpatient   Certification Statement: The patient will continue to require additional inpatient hospital stay due to need for rehab placement, monitoring while adjusting medications  Discharge Plan: Anticipate discharge in 24-48 hrs to rehab facility.    Code Status: Level 3 - DNAR and DNI    Subjective   The patient was seen and examined. The patient is sitting up on the side of the bed in no acute distress. She reports hip soreness otherwise denies any additional complaints.     Objective :  Temp:  [97.8 °F (36.6 °C)-98.5 °F (36.9 °C)] 98.3 °F (36.8 °C)  HR:  [87-99] 87  BP: (119-154)/(55-62) 119/55  Resp:  [16-20] 20  SpO2:  [89 %-93 %] 89 %  O2 Device: Nasal cannula  Nasal Cannula O2 Flow Rate (L/min):  [2 L/min] 2 L/min    Body mass index is 34.82 kg/m².     Input and Output Summary (last 24 hours):     Intake/Output Summary (Last 24 hours) at 12/10/2024 1305  Last data filed at 12/10/2024 0900  Gross per 24 hour   Intake 1020 ml   Output 1026 ml   Net -6 ml       Physical Exam  Vitals and nursing note reviewed.   Constitutional:       General: She is awake.      Appearance: Normal appearance.      Interventions: Nasal cannula in place.   HENT:      Head: Normocephalic and atraumatic.   Cardiovascular:      Rate and Rhythm: Normal rate and regular rhythm.   Pulmonary:      Effort: Pulmonary effort is normal.      Breath sounds: Normal breath sounds.   Abdominal:      Palpations: Abdomen is soft.      Tenderness: There is no abdominal tenderness.   Skin:     General: Skin is warm and dry.   Neurological:      General: No focal deficit present.      Mental Status: She is alert and oriented to person, place, and time.   Psychiatric:         Attention and  Perception: Attention normal.         Mood and Affect: Mood normal.         Speech: Speech normal.         Behavior: Behavior is cooperative.         Cognition and Memory: Cognition and memory normal.           Lines/Drains:              Lab Results: I have reviewed the following results:   Results from last 7 days   Lab Units 12/10/24  0444 12/05/24  0531 12/04/24  1457   WBC Thousand/uL 7.50   < > 16.73*   HEMOGLOBIN g/dL 10.6*   < > 13.0   HEMATOCRIT % 33.6*   < > 41.1   PLATELETS Thousands/uL 286   < > 260   SEGS PCT %  --   --  86*   LYMPHO PCT %  --   --  5*   MONO PCT %  --   --  7   EOS PCT %  --   --  1    < > = values in this interval not displayed.     Results from last 7 days   Lab Units 12/10/24  0444 12/04/24  1748 12/04/24  1457   SODIUM mmol/L 138   < > 131*   POTASSIUM mmol/L 4.1   < > 5.6*   CHLORIDE mmol/L 100   < > 98   CO2 mmol/L 35*   < > 27   BUN mg/dL 12   < > 18   CREATININE mg/dL 0.47*   < > 0.73   ANION GAP mmol/L 3*   < > 6   CALCIUM mg/dL 9.0   < > 10.2   ALBUMIN g/dL  --   --  4.4   TOTAL BILIRUBIN mg/dL  --   --  0.65   ALK PHOS U/L  --   --  60   ALT U/L  --   --  16   AST U/L  --   --  31   GLUCOSE RANDOM mg/dL 205*   < > 147*    < > = values in this interval not displayed.     Results from last 7 days   Lab Units 12/04/24  1457   INR  1.14     Results from last 7 days   Lab Units 12/10/24  1131 12/10/24  0734 12/09/24  2037 12/09/24  1607 12/09/24  1116 12/09/24  0715 12/08/24  2053 12/08/24  1616 12/08/24  1121 12/08/24  0714 12/07/24  2106 12/07/24  1650   POC GLUCOSE mg/dl 259* 192* 174* 184* 211* 184* 187* 278* 200* 148* 230* 167*               Recent Cultures (last 7 days):         Imaging Results Review: I reviewed radiology reports from this admission including: Echocardiogram.  Other Study Results Review: No additional pertinent studies reviewed.    Last 24 Hours Medication List:     Current Facility-Administered Medications:     acetaminophen (TYLENOL) tablet 975 mg, Q8H  JILLIAN    albuterol (PROVENTIL HFA,VENTOLIN HFA) inhaler 2 puff, Q6H PRN    ammonium lactate (LAC-HYDRIN) 12 % lotion, Daily    aspirin (ECOTRIN LOW STRENGTH) EC tablet 81 mg, Daily    atorvastatin (LIPITOR) tablet 10 mg, Daily With Dinner    buPROPion (WELLBUTRIN XL) 24 hr tablet 300 mg, Daily    cefadroxil (DURICEF) capsule 1,000 mg, Q12H JILLIAN    clonazePAM (KlonoPIN) tablet 1 mg, QPM    cyanocobalamin (VITAMIN B-12) tablet 1,000 mcg, Daily    docusate sodium (COLACE) capsule 100 mg, BID    Fluticasone Furoate-Vilanterol 100-25 mcg/actuation 1 puff, Daily    insulin glargine (LANTUS) subcutaneous injection 5 Units 0.05 mL, HS    insulin lispro (HumALOG/ADMELOG) 100 units/mL subcutaneous injection 1-6 Units, TID AC **AND** Fingerstick Glucose (POCT), TID AC    insulin lispro (HumALOG/ADMELOG) 100 units/mL subcutaneous injection 1-6 Units, HS    lisinopril (ZESTRIL) tablet 5 mg, Daily    metoprolol succinate (TOPROL-XL) 24 hr tablet 12.5 mg, Daily    morphine injection 2 mg, Q4H PRN    nicotine (NICODERM CQ) 21 mg/24 hr TD 24 hr patch 1 patch, Daily    ondansetron (ZOFRAN) injection 4 mg, Q6H PRN    oxyCODONE (ROXICODONE) immediate release tablet 10 mg, Q4H PRN    oxyCODONE (ROXICODONE) IR tablet 5 mg, Q4H PRN    pantoprazole (PROTONIX) EC tablet 40 mg, Daily    polyethylene glycol (MIRALAX) packet 17 g, Daily    pregabalin (LYRICA) capsule 200 mg, TID    rivaroxaban (XARELTO) tablet 2.5 mg, BID With Meals    Administrative Statements   Today, Patient Was Seen By: Nate Burns PA-C  I have spent a total time of 35 minutes in caring for this patient on the day of the visit/encounter including Documenting in the medical record, Reviewing / ordering tests, medicine, procedures  , and Communicating with other healthcare professionals .    **Please Note: This note may have been constructed using a voice recognition system.**

## 2024-12-10 NOTE — ASSESSMENT & PLAN NOTE
-Followed by vascular in the outpatient setting s/p right axillary to femoral bypass with PTFE on 1/31/2024   -Continue medical management with aspirin 80 mg daily, Xarelto 2.5 mg twice daily and would uptitrate atorvastatin if patient tolerates  -Counseled on need for tobacco cessation however patient does not wish to stop at this time.

## 2024-12-10 NOTE — PHYSICAL THERAPY NOTE
PT Treatment Note    NAME:  Lona Cedeno  DATE: 12/10/24    AGE:   79 y.o.  Mrn:   [id number]  ADMIT DX:  Closed fracture of left hip, initial encounter ([facility]) [S72.002A]  Closed left hip fracture, initial encounter ([facility]) [S72.002A]  Performed at least 2 patient identifiers during session: Name and Epic photo       12/10/24 1358   PT Last Visit   PT Visit Date 12/10/24   Note Type   Note Type Treatment   Pain Assessment   Pain Assessment Tool 0-10   Pain Score 7   Pain Location/Orientation Orientation: Left;Location: Hip   Pain Onset/Description Onset: Ongoing   Patient's Stated Pain Goal No pain   Hospital Pain Intervention(s) Medication (See MAR);Ambulation/increased activity   Restrictions/Precautions   Weight Bearing Precautions Per Order Yes   LLE Weight Bearing Per Order WBAT   Other Precautions Bed Alarm;Chair Alarm;O2;Fall Risk   General   Chart Reviewed Yes   Cognition   Overall Cognitive Status WFL   Arousal/Participation Alert;Responsive;Cooperative   Attention Within functional limits   Orientation Level Oriented X4   Memory Within functional limits   Following Commands Follows all commands and directions without difficulty   Subjective   Subjective PT agreeable to PT treatment   Bed Mobility   Additional Comments Pt started session out of bed and in the chair   Transfers   Sit to Stand 4  Minimal assistance   Additional items Assist x 2;Increased time required;Verbal cues   Stand to Sit 4  Minimal assistance   Additional items Assist x 2;Armrests;Increased time required;Verbal cues   Ambulation/Elevation   Ambulation/Elevation Additional Comments Pt attempted to perform ambulation but had fear throughout session in performing. Pt had 3 people in room and consistent encouragement, but could not perfrom any steps due to her nerves and fear   Balance   Static Sitting Good   Dynamic Sitting Fair +   Static Standing Fair   Dynamic Standing Poor +   Ambulatory Poor +   Activity Tolerance   Activity  Tolerance Patient limited by pain;Patient limited by fatigue   Exercises   Hip Adduction Sitting;15 reps;Bilateral   Knee AROM Long Arc Quad Sitting;15 reps;Left   Ankle Pumps Sitting;15 reps;Left   UE Exercise   (UE exercies with OT)   Marching Sitting;5 reps;Left   Assessment   Prognosis Good   Problem List Decreased strength;Decreased endurance;Impaired balance;Decreased mobility;Decreased range of motion;Orthopedic restrictions;Pain   Assessment Pt seen for PT treatment session this date with interventions consisting of gait training to normalize gait pattern to decrease fall risk, therapeutic activity to educate patient on safe transfers to progress as able, and therapeutic exercise to improve strength to improve functional mobility. Pt agreeable to PT treatment session upon arrival, pt found seated OOB in recliner, in no apparent distress, A&O x 4, and responsive. Since previous session, pt has made fair progress as evidenced by decreased assistance required with mobility  Barriers during this session include pain and fear .  Pt continues to be functioning below baseline level, and remains limited 2* factors listed above and including decreased strength, impaired activity tolerance, impaired  balance, pain, and decreased ROM.  Pt prognosis for achieving goals is good, pending pt progress with hospitalization/medical status improvements, and indicated by motivated to participate in therapy and ability to follow cues. PT will continue to see pt during current hospitalization in order to address the deficits listed above and provide interventions consistent w/ POC in effort to achieve goals. Current goals and POC remain appropriate, pt continues to have rehab potential  Upon conclusion pt seated OOB in recliner. The patient's AM-PAC Basic Mobility Inpatient Short Form Raw Score is 9.  A Raw score of less than or equal to 16 suggests the patient may benefit from discharge to post-acute rehabilitation services.  Based on patient presentations and impairments, pt would most appropriately benefit from Level 1 resource intensity upon discharge.  Please also refer to the recommendation of the Physical Therapist for safe discharge planning. RN verbalized pt appropriate for PT session.   Goals   Patient Goals To get to walking again   LTG Expiration Date 12/17/24   PT Treatment Day 3   Plan   Treatment/Interventions Functional transfer training;ADL retraining;LE strengthening/ROM;Elevations;Therapeutic exercise;Endurance training;Gait training   Progress Progressing toward goals   PT Frequency 3-5x/wk   Discharge Recommendation   Rehab Resource Intensity Level, PT I (Maximum Resource Intensity)   Equipment Recommended Walker   AM-PAC Basic Mobility Inpatient   Turning in Flat Bed Without Bedrails 3   Lying on Back to Sitting on Edge of Flat Bed Without Bedrails 2   Moving Bed to Chair 1   Standing Up From Chair Using Arms 1   Walk in Room 1   Climb 3-5 Stairs With Railing 1   Basic Mobility Inpatient Raw Score 9   [facility] Highest Level Of Mobility   -HL Goal 3: Sit at edge of bed   JH-HLM Achieved 5: Stand (1 or more minutes)       Co treatment with OT secondary to complex medical condition of pt, possible A of 2 required to achieve and maintain transitional movements, requiring the need of skilled therapeutic intervention of 2 therapists to achieve delivery of services.      Time In: 1316  Time Out: 1358  Total Treatment Minutes: 42    Johnnie Carter, PT

## 2024-12-10 NOTE — ASSESSMENT & PLAN NOTE
The patient presented after a fall from standing at home on morning of admission.  She reports that she lost her balance and landed on her left side.  Imaging shows comminuted nondisplaced intertrochanteric fracture left proximal femur   Trauma work-up is negative in ED except left hip fracture.   CK normalized with IV fluids   Orthopedics input appreciated.   S/p left hip IMN on 12/6/2025  Restarted aspirin and Xarelto 12/7  Pain management  PT/OT recommending rehab  CM placed referrals- patient accepted at [facility]- insurance denied- will need to due a [id number]

## 2024-12-10 NOTE — ASSESSMENT & PLAN NOTE
-Currently on 2 L nasal cannula oxygen  -Patient counseled on need for tobacco cessation  -Plan of care per primary team

## 2024-12-10 NOTE — OCCUPATIONAL THERAPY NOTE
12/10/24 1400   OT Last Visit   OT Visit Date 12/10/24   Note Type   Note Type Treatment   Pain Assessment   Pain Assessment Tool 0-10   Pain Score 7   Pain Location/Orientation Orientation: Left;Location: Hip   Pain Onset/Description Onset: Ongoing;Frequency: Constant/Continuous   Patient's Stated Pain Goal No pain   Hospital Pain Intervention(s) Medication (See MAR);Repositioned;Ambulation/increased activity;Emotional support   Restrictions/Precautions   Weight Bearing Precautions Per Order Yes   LLE Weight Bearing Per Order WBAT   Other Precautions Bed Alarm;Chair Alarm;O2;Fall Risk   Transfers   Sit to Stand 4  Minimal assistance   Additional items Assist x 2;Increased time required;Verbal cues   Stand to Sit 4  Minimal assistance   Additional items Assist x 2;Increased time required;Verbal cues   Additional Comments Attempted mobility; unable to move R LE, however, did stand x 2 x 5 mins each stance.   Therapeutic Excerise-Strength   UE Strength Yes   Right Upper Extremity- Strength   R Shoulder Flexion;Horizontal ABduction;Extension  (horiz. add, scap pro/ret)   R Elbow Elbow flexion;Elbow extension   R Wrist   (wrist rotation)   R Position Seated   Equipment Dumbbell   R Weight/Reps/Sets 1# weight x 15 reps   Left Upper Extremity-Strength   L Weights/Reps/Sets TE same as RUE above   Cognition   Overall Cognitive Status WFL   Arousal/Participation Alert;Responsive;Cooperative   Attention Within functional limits   Orientation Level Oriented X4   Memory Within functional limits   Following Commands Follows all commands and directions without difficulty   Comments PT agreeable to OT treatment.   Activity Tolerance   Activity Tolerance Patient limited by fatigue;Patient limited by pain   Assessment   Assessment Patient participated in Skilled OT session this date with interventions consisting of functional transfer training, Energy Conservation techniques, therapeutic exercise to: increase functional use of  BUEs, increase BUE muscle strength , and increase dynamic sit/ stand balance during functional activity  . Patient agreeable to OT treatment session, upon arrival patient was found seated OOB to Recliner, alert, responsive , and in no apparent distress. Patient performs UB TE using 1# weight x 15 reps all directions as detailed in flow sheet. Patient then transfers sit to stand with Min A x 2. Attempted to take steps however patient states she cannot advance R LE. Patient stood x 2 x 5 minutes both times. Patient declined further activity and session ended with patient seated OOB to recliner, all needs met, and call bell within reach. In comparison to previous session, patient with improvements in UB strength, endurance, and functional transfers. Patient requiring frequent re direction, verbal cues for safety, verbal cues for correct technique, and frequent rest periods. Patient performance  demonstrated good carryover of learned techniques and strategies to facilitate safety during functional tasks. Patient continues to be functioning below baseline level, occupational performance remains limited secondary to factors listed above and increased risk for falls and injury.   From OT standpoint, recommendation at time of d/c would be Level I (Maximum Resource Intensity). Patient to benefit from continued Occupational Therapy treatment while in the hospital to address deficits as defined above and maximize level of functional independence with ADLs and functional mobility in order to return to OF.   Plan   Treatment Interventions Functional transfer training;UE strengthening/ROM;Energy conservation   Goal Expiration Date 12/19/24   OT Treatment Day 1   OT Frequency 3-5x/wk   Discharge Recommendation   Rehab Resource Intensity Level, OT I (Maximum Resource Intensity)   Additional Comments  The patient's raw score on the AM-PAC Daily Activity Inpatient Short Form is 12. A raw score of less than 19 suggests the patient  may benefit from discharge to post-acute rehabilitation services. Please refer to the recommendation of the Occupational Therapist for safe discharge planning.   AM-PAC Daily Activity Inpatient   Lower Body Dressing 2   Bathing 2   Toileting 1   Upper Body Dressing 1   Grooming 2   Eating 4   Daily Activity Raw Score 12   Daily Activity Standardized Score (Calc for Raw Score >=11) 30.6   AM-PAC Applied Cognition Inpatient   Following a Speech/Presentation 3   Understanding Ordinary Conversation 4   Taking Medications 4   Remembering Where Things Are Placed or Put Away 3   Remembering List of 4-5 Errands 3   Taking Care of Complicated Tasks 3   Applied Cognition Raw Score 20   Applied Cognition Standardized Score 41.76       Sidra John

## 2024-12-10 NOTE — ASSESSMENT & PLAN NOTE
BP improved  Restart lisinopril 12/9  Continue to hold amiloride    Monitor BP closely and re-initiate medications as appropriate

## 2024-12-11 ENCOUNTER — TRANSITIONAL CARE MANAGEMENT (OUTPATIENT)
Dept: FAMILY MEDICINE CLINIC | Facility: CLINIC | Age: 79
End: 2024-12-11

## 2024-12-11 VITALS
HEART RATE: 76 BPM | RESPIRATION RATE: 17 BRPM | SYSTOLIC BLOOD PRESSURE: 127 MMHG | TEMPERATURE: 98.4 F | OXYGEN SATURATION: 95 % | HEIGHT: 61 IN | DIASTOLIC BLOOD PRESSURE: 62 MMHG | WEIGHT: 186.73 LBS | BODY MASS INDEX: 35.25 KG/M2

## 2024-12-11 LAB
ANION GAP SERPL CALCULATED.3IONS-SCNC: 2 MMOL/L (ref 4–13)
BUN SERPL-MCNC: 11 MG/DL (ref 5–25)
CALCIUM SERPL-MCNC: 9.4 MG/DL (ref 8.4–10.2)
CHLORIDE SERPL-SCNC: 99 MMOL/L (ref 96–108)
CO2 SERPL-SCNC: 38 MMOL/L (ref 21–32)
CREAT SERPL-MCNC: 0.5 MG/DL (ref 0.6–1.3)
ERYTHROCYTE [DISTWIDTH] IN BLOOD BY AUTOMATED COUNT: 14.2 % (ref 11.6–15.1)
FLUAV AG UPPER RESP QL IA.RAPID: NEGATIVE
FLUBV AG UPPER RESP QL IA.RAPID: NEGATIVE
GFR SERPL CREATININE-BSD FRML MDRD: 92 ML/MIN/1.73SQ M
GLUCOSE SERPL-MCNC: 181 MG/DL (ref 65–140)
GLUCOSE SERPL-MCNC: 181 MG/DL (ref 65–140)
GLUCOSE SERPL-MCNC: 283 MG/DL (ref 65–140)
HCT VFR BLD AUTO: 34.5 % (ref 34.8–46.1)
HGB BLD-MCNC: 10.3 G/DL (ref 11.5–15.4)
MCH RBC QN AUTO: 29 PG (ref 26.8–34.3)
MCHC RBC AUTO-ENTMCNC: 29.9 G/DL (ref 31.4–37.4)
MCV RBC AUTO: 97 FL (ref 82–98)
PLATELET # BLD AUTO: 296 THOUSANDS/UL (ref 149–390)
PMV BLD AUTO: 9.3 FL (ref 8.9–12.7)
POTASSIUM SERPL-SCNC: 4.2 MMOL/L (ref 3.5–5.3)
RBC # BLD AUTO: 3.55 MILLION/UL (ref 3.81–5.12)
SARS-COV+SARS-COV-2 AG RESP QL IA.RAPID: NEGATIVE
SODIUM SERPL-SCNC: 139 MMOL/L (ref 135–147)
WBC # BLD AUTO: 7.32 THOUSAND/UL (ref 4.31–10.16)

## 2024-12-11 PROCEDURE — 82948 REAGENT STRIP/BLOOD GLUCOSE: CPT

## 2024-12-11 PROCEDURE — 97110 THERAPEUTIC EXERCISES: CPT

## 2024-12-11 PROCEDURE — 99024 POSTOP FOLLOW-UP VISIT: CPT | Performed by: PHYSICIAN ASSISTANT

## 2024-12-11 PROCEDURE — 87804 INFLUENZA ASSAY W/OPTIC: CPT | Performed by: INTERNAL MEDICINE

## 2024-12-11 PROCEDURE — 85027 COMPLETE CBC AUTOMATED: CPT | Performed by: PHYSICIAN ASSISTANT

## 2024-12-11 PROCEDURE — 97530 THERAPEUTIC ACTIVITIES: CPT

## 2024-12-11 PROCEDURE — 99232 SBSQ HOSP IP/OBS MODERATE 35: CPT | Performed by: INTERNAL MEDICINE

## 2024-12-11 PROCEDURE — 87811 SARS-COV-2 COVID19 W/OPTIC: CPT | Performed by: INTERNAL MEDICINE

## 2024-12-11 PROCEDURE — 97116 GAIT TRAINING THERAPY: CPT

## 2024-12-11 PROCEDURE — 80048 BASIC METABOLIC PNL TOTAL CA: CPT | Performed by: PHYSICIAN ASSISTANT

## 2024-12-11 PROCEDURE — 99239 HOSP IP/OBS DSCHRG MGMT >30: CPT | Performed by: INTERNAL MEDICINE

## 2024-12-11 RX ORDER — OXYCODONE HYDROCHLORIDE 5 MG/1
5 TABLET ORAL EVERY 6 HOURS PRN
Qty: 10 TABLET | Refills: 0 | Status: SHIPPED | OUTPATIENT
Start: 2024-12-11 | End: 2024-12-21

## 2024-12-11 RX ORDER — METOPROLOL SUCCINATE 25 MG/1
12.5 TABLET, EXTENDED RELEASE ORAL DAILY
Start: 2024-12-12

## 2024-12-11 RX ORDER — CEFADROXIL 500 MG/1
1000 CAPSULE ORAL EVERY 12 HOURS SCHEDULED
Start: 2024-12-11 | End: 2024-12-12

## 2024-12-11 RX ORDER — DOCUSATE SODIUM 100 MG/1
100 CAPSULE, LIQUID FILLED ORAL 2 TIMES DAILY
Start: 2024-12-11

## 2024-12-11 RX ADMIN — OXYCODONE HYDROCHLORIDE 10 MG: 10 TABLET ORAL at 15:40

## 2024-12-11 RX ADMIN — NICOTINE 1 PATCH: 21 PATCH, EXTENDED RELEASE TRANSDERMAL at 08:26

## 2024-12-11 RX ADMIN — INSULIN LISPRO 4 UNITS: 100 INJECTION, SOLUTION INTRAVENOUS; SUBCUTANEOUS at 11:43

## 2024-12-11 RX ADMIN — PREGABALIN 200 MG: 100 CAPSULE ORAL at 08:27

## 2024-12-11 RX ADMIN — ATORVASTATIN CALCIUM 10 MG: 10 TABLET, FILM COATED ORAL at 15:33

## 2024-12-11 RX ADMIN — FLUTICASONE FUROATE AND VILANTEROL TRIFENATATE 1 PUFF: 100; 25 POWDER RESPIRATORY (INHALATION) at 08:40

## 2024-12-11 RX ADMIN — CYANOCOBALAMIN TAB 500 MCG 1000 MCG: 500 TAB at 08:27

## 2024-12-11 RX ADMIN — RIVAROXABAN 2.5 MG: 2.5 TABLET, FILM COATED ORAL at 15:33

## 2024-12-11 RX ADMIN — RIVAROXABAN 2.5 MG: 2.5 TABLET, FILM COATED ORAL at 08:25

## 2024-12-11 RX ADMIN — INSULIN LISPRO 1 UNITS: 100 INJECTION, SOLUTION INTRAVENOUS; SUBCUTANEOUS at 08:00

## 2024-12-11 RX ADMIN — DOCUSATE SODIUM 100 MG: 100 CAPSULE, LIQUID FILLED ORAL at 08:28

## 2024-12-11 RX ADMIN — ACETAMINOPHEN 975 MG: 325 TABLET ORAL at 15:06

## 2024-12-11 RX ADMIN — ASPIRIN 81 MG: 81 TABLET, COATED ORAL at 08:28

## 2024-12-11 RX ADMIN — CEFADROXIL 1000 MG: 500 CAPSULE ORAL at 08:28

## 2024-12-11 RX ADMIN — METOPROLOL SUCCINATE 12.5 MG: 25 TABLET, EXTENDED RELEASE ORAL at 08:27

## 2024-12-11 RX ADMIN — BUPROPION HYDROCHLORIDE 300 MG: 150 TABLET, EXTENDED RELEASE ORAL at 08:27

## 2024-12-11 RX ADMIN — POLYETHYLENE GLYCOL 3350 17 G: 17 POWDER, FOR SOLUTION ORAL at 08:26

## 2024-12-11 RX ADMIN — PREGABALIN 200 MG: 100 CAPSULE ORAL at 15:07

## 2024-12-11 RX ADMIN — LISINOPRIL 5 MG: 5 TABLET ORAL at 08:28

## 2024-12-11 RX ADMIN — ACETAMINOPHEN 975 MG: 325 TABLET ORAL at 06:27

## 2024-12-11 RX ADMIN — Medication: at 08:26

## 2024-12-11 RX ADMIN — PANTOPRAZOLE SODIUM 40 MG: 40 TABLET, DELAYED RELEASE ORAL at 08:28

## 2024-12-11 NOTE — PLAN OF CARE
Problem: Potential for Falls  Goal: Patient will remain free of falls  Description: INTERVENTIONS:  - Educate patient/family on patient safety including physical limitations  - Instruct patient to call for assistance with activity   - Consult OT/PT to assist with strengthening/mobility   - Keep Call bell within reach  - Keep bed low and locked with side rails adjusted as appropriate  - Keep care items and personal belongings within reach  - Initiate and maintain comfort rounds  - Make Fall Risk Sign visible to staff  - Offer Toileting every 1 Hours, in advance of need  - Initiate/Maintain bed alarm  - Obtain necessary fall risk management equipment: bed alarm  - Apply yellow socks and bracelet for high fall risk patients  - Consider moving patient to room near nurses station  Outcome: Progressing     Problem: Prexisting or High Potential for Compromised Skin Integrity  Goal: Skin integrity is maintained or improved  Description: INTERVENTIONS:  - Identify patients at risk for skin breakdown  - Assess and monitor skin integrity  - Assess and monitor nutrition and hydration status  - Monitor labs   - Assess for incontinence   - Turn and reposition patient  - Assist with mobility/ambulation  - Relieve pressure over bony prominences  - Avoid friction and shearing  - Provide appropriate hygiene as needed including keeping skin clean and dry  - Evaluate need for skin moisturizer/barrier cream  - Collaborate with interdisciplinary team   - Patient/family teaching  - Consider wound care consult   Outcome: Progressing     Problem: PAIN - ADULT  Goal: Verbalizes/displays adequate comfort level or baseline comfort level  Description: Interventions:  - Encourage patient to monitor pain and request assistance  - Assess pain using appropriate pain scale  - Administer analgesics based on type and severity of pain and evaluate response  - Implement non-pharmacological measures as appropriate and evaluate response  - Consider  cultural and social influences on pain and pain management  - Notify physician/advanced practitioner if interventions unsuccessful or patient reports new pain  Outcome: Progressing     Problem: INFECTION - ADULT  Goal: Absence or prevention of progression during hospitalization  Description: INTERVENTIONS:  - Assess and monitor for signs and symptoms of infection  - Monitor lab/diagnostic results  - Monitor all insertion sites, i.e. indwelling lines, tubes, and drains  - Monitor endotracheal if appropriate and nasal secretions for changes in amount and color  - [place] appropriate cooling/warming therapies per order  - Administer medications as ordered  - Instruct and encourage patient and family to use good hand hygiene technique  - Identify and instruct in appropriate isolation precautions for identified infection/condition  Outcome: Progressing     Problem: SAFETY ADULT  Goal: Patient will remain free of falls  Description: INTERVENTIONS:  - Educate patient/family on patient safety including physical limitations  - Instruct patient to call for assistance with activity   - Consult OT/PT to assist with strengthening/mobility   - Keep Call bell within reach  - Keep bed low and locked with side rails adjusted as appropriate  - Keep care items and personal belongings within reach  - Initiate and maintain comfort rounds  - Make Fall Risk Sign visible to staff  - Offer Toileting every 1 Hours, in advance of need  - Initiate/Maintain bed alarm  - Obtain necessary fall risk management equipment: bed alarm  - Apply yellow socks and bracelet for high fall risk patients  - Consider moving patient to room near nurses station  Outcome: Progressing  Goal: Maintain or return to baseline ADL function  Description: INTERVENTIONS:  -  Assess patient's ability to carry out ADLs; assess patient's baseline for ADL function and identify physical deficits which impact ability to perform ADLs (bathing, care of mouth/teeth, toileting,  grooming, dressing, etc.)  - Assess/evaluate cause of self-care deficits   - Assess range of motion  - Assess patient's mobility; develop plan if impaired  - Assess patient's need for assistive devices and provide as appropriate  - Encourage maximum independence but intervene and supervise when necessary  - Involve family in performance of ADLs  - Assess for home care needs following discharge   - Consider OT consult to assist with ADL evaluation and planning for discharge  - Provide patient education as appropriate  Outcome: Progressing  Goal: Maintains/Returns to pre admission functional level  Description: INTERVENTIONS:  - Perform AM-PAC 6 Click Basic Mobility/ Daily Activity assessment daily.  - Set and communicate daily mobility goal to care team and patient/family/caregiver.   - Collaborate with rehabilitation services on mobility goals if consulted  - Perform Range of Motion 3 times a day.  - Reposition patient every 2 hours.  - Dangle patient 3 times a day  - Stand patient 3 times a day  - Ambulate patient 3 times a day  - Out of bed to chair 3 times a day   - Out of bed for meals 3 times a day  - Out of bed for toileting  - Record patient progress and toleration of activity level   Outcome: Progressing     Problem: DISCHARGE PLANNING  Goal: Discharge to home or other facility with appropriate resources  Description: INTERVENTIONS:  - Identify barriers to discharge w/patient and caregiver  - Arrange for needed discharge resources and transportation as appropriate  - Identify discharge learning needs (meds, wound care, etc.)  - Arrange for interpretive services to assist at discharge as needed  - Refer to Case Management Department for coordinating discharge planning if the patient needs post-hospital services based on physician/advanced practitioner order or complex needs related to functional status, cognitive ability, or social support system  Outcome: Progressing     Problem: Knowledge Deficit  Goal:  Patient/family/caregiver demonstrates understanding of disease process, treatment plan, medications, and discharge instructions  Description: Complete learning assessment and assess knowledge base.  Interventions:  - Provide teaching at level of understanding  - Provide teaching via preferred learning methods  Outcome: Progressing

## 2024-12-11 NOTE — NURSING NOTE
[email or web address] in stable condition. All belongings packed and sent with pt.

## 2024-12-11 NOTE — ASSESSMENT & PLAN NOTE
Patient is POD 5 s/p left hip short TFN    - WBAT  - Abx: duricef 500 mg bid for 5 days  - DVT ppx: home eliquis and ASA today  - PT/OT  - Pain control and medical management peMedical management per primary team  - Case management consult for dispo planning

## 2024-12-11 NOTE — PROGRESS NOTES
Progress Note - Orthopedics   Name: Lona Cedeno 79 y.o. female I MRN: [id number]  Unit/Bed#: -01 I Date of Admission: 12/4/2024   Date of Service: 12/11/2024 I Hospital Day: 7     Assessment & Plan  Closed left hip fracture, initial encounter (HCC)    Patient is POD 5 s/p left hip short TFN    - WBAT  - Abx: duricef 500 mg bid for 5 days  - DVT ppx: home eliquis and ASA today  - PT/OT  - Pain control and medical management peMedical management per primary team  - Case management consult for dispo planning    Type 2 diabetes mellitus with diabetic polyneuropathy, without long-term current use of insulin ([facility])  Lab Results   Component Value Date    HGBA1C 7.4 (H) 10/24/2024       Recent Labs     12/10/24  1616 12/10/24  2107 12/11/24  0702 12/11/24  1049   POCGLU 213* 239* 181* 283*       Blood Sugar Average: Last 72 hrs:  (P) 209.5    Essential hypertension    Anxiety and depression    PAD (peripheral artery disease) ([facility])    Preoperative clearance    Urinary retention    Cardiomyopathy ([facility])      Ok for discharge from Orthopedics service perspective.    Subjective   79 y.o.female postop day 5 left hip TFN.  Patient ambulating with therapy only short distances and has not used the bathroom as yet only the bedside commode.  She notes expected pain about the lateral left hip.  Denies specific anterior left thigh pain or calf pain.  Denies any numbness or tingling of the left lower extremity.  No acute events, no new complaints.  Denies fevers, chills, CP, SOB, N/V, numbness or tingling. Patient reports no issues with urination or bowel movements.     Objective :  Temp:  [98.2 °F (36.8 °C)-98.8 °F (37.1 °C)] 98.3 °F (36.8 °C)  HR:  [74-85] 76  BP: (126-138)/(56-63) 134/57  Resp:  [16-19] 16  SpO2:  [90 %-95 %] 92 %  O2 Device: Nasal cannula  Nasal Cannula O2 Flow Rate (L/min):  [2 L/min] 2 L/min    Physical Exam  Musculoskeletal: left hip Flex dressings in place which are clean and dry.  Minimal thigh swelling.   Minimal tenderness az-incisional he to palpation.  No medial or anterior thigh discomfort.  No calf pain negative Homans' sign.  Patient can actively extend the knee in the seated position in the bedside chair.  She has good plantar and dorsiflexion.  Moderate discomfort with rotation of the lower leg.  Light touch sensation intact distally.      Lab Results: I have reviewed the following results:  Recent Labs     12/09/24  0527 12/10/24  0444 12/11/24  0628   WBC 6.97 7.50 7.32   HGB 10.2* 10.6* 10.3*   HCT 33.3* 33.6* 34.5*    286 296   BUN 11 12 11   CREATININE 0.51* 0.47* 0.50*     Blood Culture:    Lab Results   Component Value Date    BLOODCX No Growth After 5 Days. 10/23/2024     Wound Culture:   Lab Results   Component Value Date    WOUNDCULT 1+ Growth of Klebsiella pneumoniae (A) 02/14/2024    WOUNDCULT 1+ Growth of Proteus vulgaris group (A) 02/14/2024    WOUNDCULT Few Colonies of 02/14/2024

## 2024-12-11 NOTE — ASSESSMENT & PLAN NOTE
Lab Results   Component Value Date    HGBA1C 7.4 (H) 10/24/2024       Recent Labs     12/10/24  1616 12/10/24  2107 12/11/24  0702 12/11/24  1049   POCGLU 213* 239* 181* 283*       Blood Sugar Average: Last 72 hrs:  (P) 209.5

## 2024-12-11 NOTE — PLAN OF CARE
Problem: Potential for Falls  Goal: Patient will remain free of falls  Description: INTERVENTIONS:  - Educate patient/family on patient safety including physical limitations  - Instruct patient to call for assistance with activity   - Consult OT/PT to assist with strengthening/mobility   - Keep Call bell within reach  - Keep bed low and locked with side rails adjusted as appropriate  - Keep care items and personal belongings within reach  - Initiate and maintain comfort rounds  - Make Fall Risk Sign visible to staff  - Offer Toileting every 4 Hours, in advance of need  - Initiate/Maintain bed/chair alarm  - Obtain necessary fall risk management equipment: yellow socks  - Apply yellow socks and bracelet for high fall risk patients  - Consider moving patient to room near nurses station  Outcome: Progressing     Problem: Prexisting or High Potential for Compromised Skin Integrity  Goal: Skin integrity is maintained or improved  Description: INTERVENTIONS:  - Identify patients at risk for skin breakdown  - Assess and monitor skin integrity  - Assess and monitor nutrition and hydration status  - Monitor labs   - Assess for incontinence   - Turn and reposition patient  - Assist with mobility/ambulation  - Relieve pressure over bony prominences  - Avoid friction and shearing  - Provide appropriate hygiene as needed including keeping skin clean and dry  - Evaluate need for skin moisturizer/barrier cream  - Collaborate with interdisciplinary team   - Patient/family teaching  - Consider wound care consult   Outcome: Progressing     Problem: PAIN - ADULT  Goal: Verbalizes/displays adequate comfort level or baseline comfort level  Description: Interventions:  - Encourage patient to monitor pain and request assistance  - Assess pain using appropriate pain scale  - Administer analgesics based on type and severity of pain and evaluate response  - Implement non-pharmacological measures as appropriate and evaluate response  -  Consider cultural and social influences on pain and pain management  - Notify physician/advanced practitioner if interventions unsuccessful or patient reports new pain  Outcome: Progressing     Problem: INFECTION - ADULT  Goal: Absence or prevention of progression during hospitalization  Description: INTERVENTIONS:  - Assess and monitor for signs and symptoms of infection  - Monitor lab/diagnostic results  - Monitor all insertion sites, i.e. indwelling lines, tubes, and drains  - Monitor endotracheal if appropriate and nasal secretions for changes in amount and color  - [place] appropriate cooling/warming therapies per order  - Administer medications as ordered  - Instruct and encourage patient and family to use good hand hygiene technique  - Identify and instruct in appropriate isolation precautions for identified infection/condition  Outcome: Progressing     Problem: SAFETY ADULT  Goal: Patient will remain free of falls  Description: INTERVENTIONS:  - Educate patient/family on patient safety including physical limitations  - Instruct patient to call for assistance with activity   - Consult OT/PT to assist with strengthening/mobility   - Keep Call bell within reach  - Keep bed low and locked with side rails adjusted as appropriate  - Keep care items and personal belongings within reach  - Initiate and maintain comfort rounds  - Make Fall Risk Sign visible to staff  - Offer Toileting every 4 Hours, in advance of need  - Initiate/Maintain bed/chair alarm  - Obtain necessary fall risk management equipment: yellow socks  - Apply yellow socks and bracelet for high fall risk patients  - Consider moving patient to room near nurses station  Outcome: Progressing  Goal: Maintain or return to baseline ADL function  Description: INTERVENTIONS:  -  Assess patient's ability to carry out ADLs; assess patient's baseline for ADL function and identify physical deficits which impact ability to perform ADLs (bathing, care of  mouth/teeth, toileting, grooming, dressing, etc.)  - Assess/evaluate cause of self-care deficits   - Assess range of motion  - Assess patient's mobility; develop plan if impaired  - Assess patient's need for assistive devices and provide as appropriate  - Encourage maximum independence but intervene and supervise when necessary  - Involve family in performance of ADLs  - Assess for home care needs following discharge   - Consider OT consult to assist with ADL evaluation and planning for discharge  - Provide patient education as appropriate  Outcome: Progressing  Goal: Maintains/Returns to pre admission functional level  Description: INTERVENTIONS:  - Perform AM-PAC 6 Click Basic Mobility/ Daily Activity assessment daily.  - Set and communicate daily mobility goal to care team and patient/family/caregiver.   - Collaborate with rehabilitation services on mobility goals if consulted  - Perform Range of Motion 3 times a day.  - Reposition patient every 2 hours.  - Dangle patient 3 times a day  - Stand patient 3 times a day  - Ambulate patient 3 times a day  - Out of bed to chair 3 times a day   - Out of bed for meals 3 times a day  - Out of bed for toileting  - Record patient progress and toleration of activity level   Outcome: Progressing     Problem: DISCHARGE PLANNING  Goal: Discharge to home or other facility with appropriate resources  Description: INTERVENTIONS:  - Identify barriers to discharge w/patient and caregiver  - Arrange for needed discharge resources and transportation as appropriate  - Identify discharge learning needs (meds, wound care, etc.)  - Arrange for interpretive services to assist at discharge as needed  - Refer to Case Management Department for coordinating discharge planning if the patient needs post-hospital services based on physician/advanced practitioner order or complex needs related to functional status, cognitive ability, or social support system  Outcome: Progressing     Problem:  Knowledge Deficit  Goal: Patient/family/caregiver demonstrates understanding of disease process, treatment plan, medications, and discharge instructions  Description: Complete learning assessment and assess knowledge base.  Interventions:  - Provide teaching at level of understanding  - Provide teaching via preferred learning methods  Outcome: Progressing

## 2024-12-11 NOTE — PLAN OF CARE
Problem: Potential for Falls  Goal: Patient will remain free of falls  Description: INTERVENTIONS:  - Educate patient/family on patient safety including physical limitations  - Instruct patient to call for assistance with activity   - Consult OT/PT to assist with strengthening/mobility   - Keep Call bell within reach  - Keep bed low and locked with side rails adjusted as appropriate  - Keep care items and personal belongings within reach  - Initiate and maintain comfort rounds  - Make Fall Risk Sign visible to staff  - Offer Toileting every 4 Hours, in advance of need  - Initiate/Maintain bed/chair alarm  - Obtain necessary fall risk management equipment: yellow socks  - Apply yellow socks and bracelet for high fall risk patients  - Consider moving patient to room near nurses station  12/11/2024 1512 by Suresh Yancey RN  Outcome: Progressing  12/11/2024 1130 by Suresh Yancey RN  Outcome: Progressing     Problem: Prexisting or High Potential for Compromised Skin Integrity  Goal: Skin integrity is maintained or improved  Description: INTERVENTIONS:  - Identify patients at risk for skin breakdown  - Assess and monitor skin integrity  - Assess and monitor nutrition and hydration status  - Monitor labs   - Assess for incontinence   - Turn and reposition patient  - Assist with mobility/ambulation  - Relieve pressure over bony prominences  - Avoid friction and shearing  - Provide appropriate hygiene as needed including keeping skin clean and dry  - Evaluate need for skin moisturizer/barrier cream  - Collaborate with interdisciplinary team   - Patient/family teaching  - Consider wound care consult   12/11/2024 1512 by Suresh Yancey RN  Outcome: Progressing  12/11/2024 1130 by Suresh Yancey RN  Outcome: Progressing     Problem: PAIN - ADULT  Goal: Verbalizes/displays adequate comfort level or baseline comfort level  Description: Interventions:  - Encourage patient to monitor pain and request assistance  - Assess pain using  appropriate pain scale  - Administer analgesics based on type and severity of pain and evaluate response  - Implement non-pharmacological measures as appropriate and evaluate response  - Consider cultural and social influences on pain and pain management  - Notify physician/advanced practitioner if interventions unsuccessful or patient reports new pain  12/11/2024 1512 by Suresh Yancey RN  Outcome: Progressing  12/11/2024 1130 by Suresh Yancey RN  Outcome: Progressing     Problem: INFECTION - ADULT  Goal: Absence or prevention of progression during hospitalization  Description: INTERVENTIONS:  - Assess and monitor for signs and symptoms of infection  - Monitor lab/diagnostic results  - Monitor all insertion sites, i.e. indwelling lines, tubes, and drains  - Monitor endotracheal if appropriate and nasal secretions for changes in amount and color  - [place] appropriate cooling/warming therapies per order  - Administer medications as ordered  - Instruct and encourage patient and family to use good hand hygiene technique  - Identify and instruct in appropriate isolation precautions for identified infection/condition  12/11/2024 1512 by Suresh Yancey RN  Outcome: Progressing  12/11/2024 1130 by Suresh Yancey RN  Outcome: Progressing     Problem: SAFETY ADULT  Goal: Patient will remain free of falls  Description: INTERVENTIONS:  - Educate patient/family on patient safety including physical limitations  - Instruct patient to call for assistance with activity   - Consult OT/PT to assist with strengthening/mobility   - Keep Call bell within reach  - Keep bed low and locked with side rails adjusted as appropriate  - Keep care items and personal belongings within reach  - Initiate and maintain comfort rounds  - Make Fall Risk Sign visible to staff  - Offer Toileting every 4 Hours, in advance of need  - Initiate/Maintain bed/chair alarm  - Obtain necessary fall risk management equipment: yellow socks  - Apply yellow socks and  bracelet for high fall risk patients  - Consider moving patient to room near nurses station  12/11/2024 1512 by Suresh Yancey RN  Outcome: Progressing  12/11/2024 1130 by Suresh Yancey RN  Outcome: Progressing  Goal: Maintain or return to baseline ADL function  Description: INTERVENTIONS:  -  Assess patient's ability to carry out ADLs; assess patient's baseline for ADL function and identify physical deficits which impact ability to perform ADLs (bathing, care of mouth/teeth, toileting, grooming, dressing, etc.)  - Assess/evaluate cause of self-care deficits   - Assess range of motion  - Assess patient's mobility; develop plan if impaired  - Assess patient's need for assistive devices and provide as appropriate  - Encourage maximum independence but intervene and supervise when necessary  - Involve family in performance of ADLs  - Assess for home care needs following discharge   - Consider OT consult to assist with ADL evaluation and planning for discharge  - Provide patient education as appropriate  12/11/2024 1512 by Suresh Yancey RN  Outcome: Progressing  12/11/2024 1130 by Suresh Yancey RN  Outcome: Progressing  Goal: Maintains/Returns to pre admission functional level  Description: INTERVENTIONS:  - Perform AM-PAC 6 Click Basic Mobility/ Daily Activity assessment daily.  - Set and communicate daily mobility goal to care team and patient/family/caregiver.   - Collaborate with rehabilitation services on mobility goals if consulted  - Perform Range of Motion 3 times a day.  - Reposition patient every 2 hours.  - Dangle patient 3 times a day  - Stand patient 3 times a day  - Ambulate patient 3 times a day  - Out of bed to chair 3 times a day   - Out of bed for meals 3 times a day  - Out of bed for toileting  - Record patient progress and toleration of activity level   12/11/2024 1512 by Suresh Yancey RN  Outcome: Progressing  12/11/2024 1130 by Suresh Yancey RN  Outcome: Progressing     Problem: DISCHARGE PLANNING  Goal:  Discharge to home or other facility with appropriate resources  Description: INTERVENTIONS:  - Identify barriers to discharge w/patient and caregiver  - Arrange for needed discharge resources and transportation as appropriate  - Identify discharge learning needs (meds, wound care, etc.)  - Arrange for interpretive services to assist at discharge as needed  - Refer to Case Management Department for coordinating discharge planning if the patient needs post-hospital services based on physician/advanced practitioner order or complex needs related to functional status, cognitive ability, or social support system  12/11/2024 1512 by Suresh Yancey RN  Outcome: Progressing  12/11/2024 1130 by Suresh Yancey RN  Outcome: Progressing     Problem: Knowledge Deficit  Goal: Patient/family/caregiver demonstrates understanding of disease process, treatment plan, medications, and discharge instructions  Description: Complete learning assessment and assess knowledge base.  Interventions:  - Provide teaching at level of understanding  - Provide teaching via preferred learning methods  12/11/2024 1512 by Suresh Yancey RN  Outcome: Progressing  12/11/2024 1130 by Suresh Yancey RN  Outcome: Progressing

## 2024-12-11 NOTE — PLAN OF CARE
Problem: OCCUPATIONAL THERAPY ADULT  Goal: Performs self-care activities at highest level of function for planned discharge setting.  See evaluation for individualized goals.  Description: Treatment Interventions: ADL retraining, Functional transfer training, UE strengthening/ROM, Endurance training, Patient/family training, Compensatory technique education, Energy conservation, Activityengagement     See flowsheet documentation for full assessment, interventions and recommendations.   Outcome: Progressing  Note: Limitation: Decreased ADL status, Decreased Safe judgement during ADL, Decreased UE strength, Decreased endurance, Decreased self-care trans, Decreased high-level ADLs  Prognosis: Good  Assessment: Patient participated in Skilled OT session this date with interventions consisting of Energy Conservation techniques, safety awareness and fall prevention techniques, therapeutic exercise to: increase functional use of BUEs, increase BUE muscle strength ,  therapeutic activities to: increase activity tolerance, increase dynamic sit/ stand balance during functional activity , increase postural control, increase trunk control, and increase OOB/ sitting tolerance . Patient agreeable to OT treatment session, upon arrival patient was found seated at edge of bed.  In comparison to previous session, patient with improvements in functional transfers and activity tolerance . Patient requiring verbal cues for correct technique and frequent rest periods. Patient continues to be functioning below baseline level, occupational performance remains limited secondary to factors listed above and increased risk for falls and injury.   From OT standpoint, recommendation at time of d/c would with moderate intensity OT resources.   Patient to benefit from continued Occupational Therapy treatment while in the hospital to address deficits as defined above and maximize level of functional independence with ADLs and functional  mobility.     Rehab Resource Intensity Level, OT: II (Moderate Resource Intensity)     Shaneka Stark OTR/L

## 2024-12-11 NOTE — ASSESSMENT & PLAN NOTE
-Blood pressures appears stable   Continue lisinopril 5 mg daily, and metoprolol succinate 12.5 mg daily

## 2024-12-11 NOTE — ASSESSMENT & PLAN NOTE
Noted to have WBC of 16.73, HR 91, and tachypnea   Suspect that SIRS criteria is likely due to recent fall with left hip fracture. No acute sign of infection at this time.   UA- negative   Will monitor off antibiotic  SIRS parameters resolved  Follow-up with urology as outpatient

## 2024-12-11 NOTE — ASSESSMENT & PLAN NOTE
Patient is POD4 s/p left hip IMN    - WBAT  - Abx: duricef 500 mg bid for 5 days  - DVT ppx: home eliquis and ASA today  - PT/OT  - Pain control per primary team  - Medical management per primary team  - Case management consult for dispo planning

## 2024-12-11 NOTE — DISCHARGE SUMMARY
Discharge Summary - Hospitalist   Name: Lona Cedeno 79 y.o. female I MRN: [id number]  Unit/Bed#: -01 I Date of Admission: 12/4/2024   Date of Service: 12/11/2024 I Hospital Day: 7     Assessment & Plan  Closed left hip fracture, initial encounter ([facility])  The patient presented after a fall from standing at home on morning of admission.  She reports that she lost her balance and landed on her left side.  Imaging shows comminuted nondisplaced intertrochanteric fracture left proximal femur   Trauma work-up is negative in ED except left hip fracture.   CK normalized with IV fluids   Orthopedics input appreciated.   S/p left hip IMN on 12/6/2025  Restarted aspirin and Xarelto 12/7  Pain management  PT/OT recommending rehab  CM has arranged for discharge to SNF today  Cardiomyopathy ([facility])  Anesthesiology reported patient with slightly wider QRS complex and recommended telemetry monitoring and echocardiogram  Echo 12/9: EF 35%, hypokinesis of the mid to apical septum, mid to apical inferior, mid to apical anterior, apical lateral and apical walls   Cardiology consultation appreciated   Patient does not wish to undergo invasive or aggressive treatments/strategies  Continue home lisinopril 5 mg daily  Starting Metoprolol succinate 12.5 mg po daily  Outpatient follow-up with cardiology  Type 2 diabetes mellitus with diabetic polyneuropathy, without long-term current use of insulin ([facility])  Lab Results   Component Value Date    HGBA1C 7.4 (H) 10/24/2024       Recent Labs     12/10/24  1616 12/10/24  2107 12/11/24  0702 12/11/24  1049   POCGLU 213* 239* 181* 283*       Blood Sugar Average: Last 72 hrs:  (P) 209.5  Resume home diabetic regimen    Essential hypertension  BP improved  Restart lisinopril 12/9  Continue to hold amiloride    Monitor BP closely and re-initiate medications as appropriate  Anxiety and depression  Continue with Wellbutrin, clonazepam and Lyrica   PAD (peripheral artery disease) ([facility])  On aspirin 81  mg daily, Xarelto 2.5 mg twice daily and statin   ASA and xarelto held for surgery- restarted 12/7  Continue statin    COPD, severity to be determined (HCC)  In no acute exacerbation  Continue with Breo and as needed albuterol   Hyperkalemia  Continue to hold amiloride for now  Low potassium diet  Resolved- continue to monitor BMP   Hyponatremia  Likely hyponatremia hypovolemia given that she was on the floor for a while with decreased p.o. intake  Received gentle IV fluids   Resolved- Monitor BMP   Acute respiratory insufficiency  Noted SpO2 of 86% on room air in the ED.   The patient chronically wears 2 L nasal cannula intermittently at home   Cxr (12/4)- no acute cardiopulmonary finding  Noted increase O2 requirement to 4L NC, now down to 3 L   Repeat cxr negaitve; possibly related to atelectasis  Continue with oxygen supplement  Monitor to keep SpO2 greater than 88%.  Incentive spirometry, cough and deep breathing   SIRS (systemic inflammatory response syndrome) (HCC)  Noted to have WBC of 16.73, HR 91, and tachypnea   Suspect that SIRS criteria is likely due to recent fall with left hip fracture. No acute sign of infection at this time.   UA- negative   Will monitor off antibiotic  SIRS parameters resolved  Follow-up with urology as outpatient  Urinary retention  Likely due to acute left hip fracture and nonambulatory status  UA- negative   Will hold oxybutynin for now  Continue with urinary retention protocol     Medical Problems       Resolved Problems  Date Reviewed: 12/5/2024   None       Discharging Physician / Practitioner: Charley Thomas MD  PCP: Vivek Preston DO  Admission Date:   Admission Orders (From admission, onward)       Ordered        12/04/24 1616  INPATIENT ADMISSION  Once                          Discharge Date: 12/11/24    Consultations During Hospital Stay:  Cardiology, orthopedics    Procedures Performed:   Left hip surgical repair by orthopedics    Significant Findings / Test  "Results:   Hip fracture    Incidental Findings:   none     Test Results Pending at Discharge (will require follow up):   None     Outpatient Tests Requested:  Routine labs with PCP as outpatient    Complications: None    Reason for Admission: Hip fracture    Hospital Course:   Lona Cedeno is a 79 y.o. female patient who originally presented to the hospital on 12/4/2024 due to fall with hip fracture.  Orthopedic team was consulted.  Patient had surgical repair on 12/6/2024.  Patient tolerated procedure well.  During hospitalization patient also had echocardiogram performed which did show new LV dysfunction.  Cardiology was consulted.  Medications were optimized.  Patient doing well from medical standpoint today.  Will be discharged to SNF for rehab.  Will need outpatient follow-up with cardiology and orthopedics on discharge.  Patient was maintained on oral antibiotics for postoperative state.  Also continued on home Xarelto postoperatively for DVT prophylaxis.    Please see above list of diagnoses and related plan for additional information.     Condition at Discharge: stable    Discharge Day Visit / Exam:   Subjective: No complaints at this time  Vitals: Blood Pressure: 134/57 (12/11/24 0833)  Pulse: 76 (12/11/24 0833)  Temperature: 98.3 °F (36.8 °C) (12/11/24 0702)  Temp Source: Oral (12/09/24 1428)  Respirations: 16 (12/11/24 0702)  Height: 5' 1\" (154.9 cm) (12/09/24 1217)  Weight - Scale: 84.7 kg (186 lb 11.7 oz) (12/11/24 0600)  SpO2: 92 % (12/11/24 0833)    Physical Exam  Constitutional:       General: She is not in acute distress.  HENT:      Head: Normocephalic and atraumatic.      Nose: Nose normal.      Mouth/Throat:      Mouth: Mucous membranes are moist.   Eyes:      Extraocular Movements: Extraocular movements intact.      Conjunctiva/sclera: Conjunctivae normal.   Cardiovascular:      Rate and Rhythm: Normal rate and regular rhythm.   Pulmonary:      Effort: Pulmonary effort is normal. No respiratory " distress.   Abdominal:      Palpations: Abdomen is soft.      Tenderness: There is no abdominal tenderness.   Musculoskeletal:         General: Normal range of motion.      Cervical back: Normal range of motion and neck supple.      Comments: Generalized weakness   Skin:     General: Skin is warm and dry.   Neurological:      General: No focal deficit present.      Mental Status: She is alert. Mental status is at baseline.      Cranial Nerves: No cranial nerve deficit.   Psychiatric:         Mood and Affect: Mood normal.         Behavior: Behavior normal.          Discussion with Family: Updated  () via phone.    Discharge instructions/Information to patient and family:   See after visit summary for information provided to patient and family.      Provisions for Follow-Up Care:  See after visit summary for information related to follow-up care and any pertinent home health orders.      Mobility at time of Discharge:   Basic Mobility Inpatient Raw Score: 12  JH-HLM Goal: 4: Move to chair/commode  JH-HLM Achieved: 5: Stand (1 or more minutes)  HLM Goal achieved. Continue to encourage appropriate mobility.     Disposition:   Other Skilled Nursing Facility at [place]    Planned Readmission: no    Discharge Medications:  See after visit summary for reconciled discharge medications provided to patient and/or family.      Administrative Statements   Discharge Statement:  I have spent a total time of 35 minutes in caring for this patient on the day of the visit/encounter. >30 minutes of time was spent on: Counseling / Coordination of care, Documenting in the medical record, Reviewing / ordering tests, medicine, procedures  , and Communicating with other healthcare professionals .    **Please Note: This note may have been constructed using a voice recognition system**

## 2024-12-11 NOTE — ASSESSMENT & PLAN NOTE
The patient presented after a fall from standing at home on morning of admission.  She reports that she lost her balance and landed on her left side.  Imaging shows comminuted nondisplaced intertrochanteric fracture left proximal femur   Trauma work-up is negative in ED except left hip fracture.   CK normalized with IV fluids   Orthopedics input appreciated.   S/p left hip IMN on 12/6/2025  Restarted aspirin and Xarelto 12/7  Pain management  PT/OT recommending rehab  CM has arranged for discharge to SNF today

## 2024-12-11 NOTE — PROGRESS NOTES
Progress Note - Cardiology   Name: Lona Cedeno 79 y.o. female I MRN: [id number]  Unit/Bed#: -01 I Date of Admission: 12/4/2024   Date of Service: 12/11/2024 I Hospital Day: 7    Assessment & Plan  Closed left hip fracture, initial encounter (HCC)  -Primary reason for hospitalization s/p repair   -Presented after mechanical fall.  Cardiomyopathy (HCC)  -Newly discovered, EF 35%  Ischemic testing previously discussed with pt. And deferred due to pt wishes as she is not experiencing any cardiac symptoms.    Continue  medical therapy-lisinopril 5 mg daily and newly started metoprolol succinate 12.5 mg daily  -Patient previously with hyperkalemia and urinary retention-will hold off on aldosterone antagonist and SGLT2 inhibitor   Essential hypertension  -Blood pressures appears stable   Continue lisinopril 5 mg daily, and metoprolol succinate 12.5 mg daily   PAD (peripheral artery disease) ([facility])  -Followed by vascular in the outpatient setting   COPD, severity to be determined ([facility])  -Currently on 2 L nasal cannula oxygen    Outpatient Cardiologist: Dr Cespedes      Subjective:   Patient seen and examined.  No significant events overnight.    Denies any chest discomfort or shortness of breath    Summary comments:  Lona Hastings was admitted following a mechanical fall and underwent IMN of the proximal left femur 12/6/2024.    She was noted to have a wide QRS by anesthesia and echocardiogram was performed showing a reduction in EF, 35%.  Patient is asymptomatic and without any report of chest discomfort or worsening shortness of breath.  Ischemic testing was discussed and she does not want to pursue any invasive testing or aggressive treatments but is agreeable to medical therapy.    Home lisinopril was continued, metoprolol added during the hospitalization.  Because of hyperkalemia and urinary retention, Aldactone and SGLT2i were not started.    Lona Hastings is euvolemic and without any cardiac symptoms.  She is tolerating  "medical therapy.  Plan is for discharge to rehab.  We will make arrangements for follow-up in the outpatient setting for continued cardiac care and up titration of medications as able.    Telemetry/ECG/Cardiac testing:   Echo 12/10/2024  EF 35% with regional variations  Mild LAE    Echo 8/14/2023  EF 50 to 55%  Mild LAE  Mild MR  Mild to moderate TR    Vitals: Blood pressure 134/57, pulse 76, temperature 98.3 °F (36.8 °C), resp. rate 16, height 5' 1\" (1.549 m), weight 84.7 kg (186 lb 11.7 oz), SpO2 92%.,   Orthostatic Blood Pressures      Flowsheet Row Most Recent Value   Blood Pressure 134/57 filed at 12/11/2024 0833   Patient Position - Orthostatic VS Sitting filed at 12/09/2024 1428        ,   Weight (last 2 days)       Date/Time Weight    12/11/24 0600 84.7 (186.73)    12/10/24 0600 83.6 (184.3)    12/09/24 1217 83 (183)    12/09/24 0530 83.1 (183.2)            Physical Exam:    General:  Normal appearance in no distress.  Eyes:  Anicteric.  Oral mucosa:  Moist.  Neck:  No JVD. Carotid upstrokes are brisk without bruits.  No masses.  Chest:  Clear to auscultation  Cardiac:  No palpable PMI.  Normal S1 and S2.  No murmur gallop or rub.  Abdomen:  Soft and nontender. No palpable organomegaly or aortic enlargement  Extremities:  No peripheral edema, L leg wrapped  Neuro:  Grossly symmetric.  Psych:  Alert and oriented x3.      Medications:      Current Facility-Administered Medications:     acetaminophen (TYLENOL) tablet 975 mg, 975 mg, Oral, Q8H JILLIAN, Masood Alcantar PA-C, 975 mg at 12/11/24 0627    albuterol (PROVENTIL HFA,VENTOLIN HFA) inhaler 2 puff, 2 puff, Inhalation, Q6H PRN, Masood Alcantar PA-C, 2 puff at 12/08/24 0918    ammonium lactate (LAC-HYDRIN) 12 % lotion, , Topical, Daily, Masood Alcantar PA-C, Given at 12/11/24 0826    aspirin (ECOTRIN LOW STRENGTH) EC tablet 81 mg, 81 mg, Oral, Daily, Masood Alcantar PA-C, 81 mg at 12/11/24 0828    atorvastatin (LIPITOR) tablet 10 mg, " 10 mg, Oral, Daily With Dinner, Masood Alcantar PA-C, 10 mg at 12/10/24 1741    buPROPion (WELLBUTRIN XL) 24 hr tablet 300 mg, 300 mg, Oral, Daily, PRISCILLA Aguiar-EDIS, 300 mg at 12/11/24 0827    cefadroxil (DURICEF) capsule 1,000 mg, 1,000 mg, Oral, Q12H JILLIAN, PRISCILLA Aguiar-EDIS, 1,000 mg at 12/11/24 0828    clonazePAM (KlonoPIN) tablet 1 mg, 1 mg, Oral, QPM, Masood Alcantar PA-C, 1 mg at 12/10/24 1742    cyanocobalamin (VITAMIN B-12) tablet 1,000 mcg, 1,000 mcg, Oral, Daily, Masood Alcantar PA-C, 1,000 mcg at 12/11/24 0827    docusate sodium (COLACE) capsule 100 mg, 100 mg, Oral, BID, Masood Alcantar PA-C, 100 mg at 12/11/24 0828    Fluticasone Furoate-Vilanterol 100-25 mcg/actuation 1 puff, 1 puff, Inhalation, Daily, Masood Alcantar PA-C, 1 puff at 12/11/24 0840    insulin glargine (LANTUS) subcutaneous injection 5 Units 0.05 mL, 5 Units, Subcutaneous, HS, Nate Burns PA-C, 5 Units at 12/10/24 2115    insulin lispro (HumALOG/ADMELOG) 100 units/mL subcutaneous injection 1-6 Units, 1-6 Units, Subcutaneous, TID AC, 1 Units at 12/11/24 0800 **AND** Fingerstick Glucose (POCT), , , TID AC, Masood Alcantar PA-C    insulin lispro (HumALOG/ADMELOG) 100 units/mL subcutaneous injection 1-6 Units, 1-6 Units, Subcutaneous, HS, Masood Alcantar PA-C, 3 Units at 12/10/24 2115    lisinopril (ZESTRIL) tablet 5 mg, 5 mg, Oral, Daily, Nate Burns PA-C, 5 mg at 12/11/24 0828    metoprolol succinate (TOPROL-XL) 24 hr tablet 12.5 mg, 12.5 mg, Oral, Daily, Sebastian Austin DO, 12.5 mg at 12/11/24 0827    morphine injection 2 mg, 2 mg, Intravenous, Q4H PRN, Masood Alcantar PA-C, 2 mg at 12/07/24 2028    nicotine (NICODERM CQ) 21 mg/24 hr TD 24 hr patch 1 patch, 1 patch, Transdermal, Daily, Masood Alcantar PA-C, 1 patch at 12/11/24 0826    ondansetron (ZOFRAN) injection 4 mg, 4 mg, Intravenous, Q6H PRN, Masood Alcantar PA-C    oxyCODONIVAN  (ROXICODONE) immediate release tablet 10 mg, 10 mg, Oral, Q4H PRN, Masood Alcantar, PA-C, 10 mg at 12/10/24 1914    oxyCODONE (ROXICODONE) IR tablet 5 mg, 5 mg, Oral, Q4H PRN, Masood Alcantar, PA-C, 5 mg at 12/09/24 1416    pantoprazole (PROTONIX) EC tablet 40 mg, 40 mg, Oral, Daily, Masood Barcenas Odmolly, PA-C, 40 mg at 12/11/24 0828    polyethylene glycol (MIRALAX) packet 17 g, 17 g, Oral, Daily, Masood Alcantar, PA-C, 17 g at 12/11/24 0826    pregabalin (LYRICA) capsule 200 mg, 200 mg, Oral, TID, Masood Alcantar, PA-C, 200 mg at 12/11/24 0827    rivaroxaban (XARELTO) tablet 2.5 mg, 2.5 mg, Oral, BID With Meals, Nate Burns, PA-C, 2.5 mg at 12/11/24 0825     Labs & Results:  Labs reviewed and prominent abnormalities reviewed above and/or below.  Troponins:         BNP:   Results from last 6 Months   Lab Units 07/16/24  1901   BNP pg/mL 22

## 2024-12-11 NOTE — CASE MANAGEMENT
Case Management Discharge Planning Note    Patient name Lona Cedeno  Location /-01 MRN [id number]  : 1945 Date 2024       Current Admission Date: 2024  Current Admission Diagnosis:Closed left hip fracture, initial encounter ([facility])   Patient Active Problem List    Diagnosis Date Noted Date Diagnosed    Cardiomyopathy ([facility]) 12/10/2024     Abnormal EKG 2024     Urinary retention 2024     Closed left hip fracture, initial encounter ([facility]) 2024     Acute respiratory insufficiency 2024     Preoperative clearance 2024     SIRS (systemic inflammatory response syndrome) ([facility]) 2024     Acute encephalopathy 10/25/2024     Hyponatremia 10/24/2024     Open wound of right foot 2024     Hyperkalemia 2024     Acute metabolic encephalopathy 2024     Toxic encephalopathy 2024     Acute respiratory failure with hypoxia ([facility]) 2024     Sacral wound 2024     Other dietary vitamin B12 deficiency anemia 2024     Leukocytosis 2024     At risk for venous thromboembolism (VTE) 2024     At high risk for skin breakdown 2024     Wound of skin 2024     Impaired mobility and activities of daily living 2024     Anemia 2024     Constipation 2024     Age-related osteoporosis without current pathological fracture 2023     Abnormal CT of the chest 10/17/2023     COPD, severity to be determined ([facility]) 10/17/2023     Tobacco use disorder 10/17/2023     PAD (peripheral artery disease) ([facility]) 10/10/2023     Bladder neoplasm 2023     Left renal mass 2023     Hypomagnesemia 2023     Degenerative lumbar disc 2023     Urinary incontinence 2023     GERD without esophagitis 10/16/2019     Essential hypertension 02/15/2019     Anxiety and depression 02/15/2019     Type 2 diabetes mellitus with diabetic polyneuropathy, without long-term current use of insulin ([facility]) 2019        LOS (days): 7  Geometric Mean LOS (GMLOS) (days): 4.4  Days to GMLOS:-2.4     OBJECTIVE:  Risk of Unplanned Readmission Score: 39.54      Current admission status: Inpatient   Preferred Pharmacy:   [id number] - [place] - [address]  [address] [address]  Phone: [phone] Fax: [phone]    [facility] [place] - [address]  [address]  [place] [address]  Phone: [phone] Fax: [phone]    [facility] [place] - [address]  [address]  [place] [address]  Phone: [phone] Fax: [phone]    Primary Care Provider: Vivek Preston DO    Primary Insurance: [facility] REP  Secondary Insurance:     DISCHARGE DETAILS:  Pt has been evaluated by [facility] and is stable to be DC to [facility] today.  Sent for transport via [facility].  Pt will be transported via [facility] at 1600.  Pt, Suresh GALLARDO made, [facility] via [facility] aware of transport time.  TC to pt nirur Ariela left a VM of same with call back number.    Accepting Facility Name, City & State : [facility] [address] [place] [address]  Receiving Facility/Agency Phone Number: [phone]  Facility/Agency Fax Number: [phone]

## 2024-12-11 NOTE — PLAN OF CARE
Problem: PHYSICAL THERAPY ADULT  Goal: Performs mobility at highest level of function for planned discharge setting.  See evaluation for individualized goals.  Description: Treatment/Interventions: Functional transfer training, Elevations, LE strengthening/ROM, Therapeutic exercise, Endurance training, Patient/family training, Equipment eval/education, Gait training, Bed mobility, Spoke to nursing  Equipment Recommended: Walker (RW)       See flowsheet documentation for full assessment, interventions and recommendations.  Outcome: Progressing  Note: Prognosis: Good  Problem List: Decreased strength, Decreased endurance, Impaired balance, Decreased mobility, Decreased range of motion, Orthopedic restrictions, Pain  Assessment: Pt seen for PT treatment session this date with interventions consisting of gait training to normalize gait pattern to decrease fall risk, therapeutic activity to educate patient on safe transfers to progress as able, and therapeutic exercise to improve strength to improve functional mobility. Pt agreeable to PT treatment session upon arrival, pt found  sitting EOB , in no apparent distress, A&O x 4, and responsive. Since previous session, pt has made fair progress as evidenced by decreased assistance required with mobility  Barriers during this session include pain, anxiety, and fear.  Pt continues to be functioning below baseline level, and remains limited 2* factors listed above and including decreased strength, impaired activity tolerance, impaired  balance, pain, and impaired cognition.  Pt prognosis for achieving goals is good, pending pt progress with hospitalization/medical status improvements, and indicated by motivated to participate in therapy and ability to follow cues. PT will continue to see pt during current hospitalization in order to address the deficits listed above and provide interventions consistent w/ POC in effort to achieve goals. Current goals and POC remain  appropriate, pt continues to have rehab potential  Upon conclusion pt seated OOB in recliner. The patient's AM-PAC Basic Mobility Inpatient Short Form Raw Score is 12.  A Raw score of less than or equal to 16 suggests the patient may benefit from discharge to post-acute rehabilitation services. Based on patient presentations and impairments, pt would most appropriately benefit from Level 1 resource intensity upon discharge.  Please also refer to the recommendation of the Physical Therapist for safe discharge planning. RN verbalized pt appropriate for PT session.  Barriers to Discharge: Inaccessible home environment, Decreased caregiver support     Rehab Resource Intensity Level, PT: I (Maximum Resource Intensity)    See flowsheet documentation for full assessment.

## 2024-12-11 NOTE — PROGRESS NOTES
Progress Note - Orthopedics   Name: Lona Cedeno 79 y.o. female I MRN: [id number]  Unit/Bed#: -01 I Date of Admission: 12/4/2024   Date of Service: 12/10/2024 I Hospital Day: 6     Assessment & Plan  Closed left hip fracture, initial encounter (HCC)    Patient is POD4 s/p left hip IMN    - WBAT  - Abx: duricef 500 mg bid for 5 days  - DVT ppx: home eliquis and ASA today  - PT/OT  - Pain control per primary team  - Medical management per primary team  - Case management consult for dispo planning    Type 2 diabetes mellitus with diabetic polyneuropathy, without long-term current use of insulin ([facility])  Lab Results   Component Value Date    HGBA1C 7.4 (H) 10/24/2024       Recent Labs     12/09/24  2037 12/10/24  0734 12/10/24  1131 12/10/24  1616   POCGLU 174* 192* 259* 213*       Blood Sugar Average: Last 72 hrs:  (P) 198.6    Essential hypertension    Anxiety and depression    PAD (peripheral artery disease) ([facility])    COPD, severity to be determined ([facility])    Hyperkalemia    Hyponatremia    Acute respiratory insufficiency    Preoperative clearance    SIRS (systemic inflammatory response syndrome) ([facility])    Urinary retention    Cardiomyopathy ([facility])      Ok for discharge from Orthopedics service perspective.    Subjective   79 y.o.female who is sitting at the edge of bed eating dinner today.  Her pain is controlled.  She does endorse continued pain in the left hip. Denies pain anywhere else. She has been ambulating with a walker with PT. She denies any numbness or tingling.  She denies any fever or chills.  She is interested in going to rehab    Objective :  Temp:  [98.2 °F (36.8 °C)-98.5 °F (36.9 °C)] 98.3 °F (36.8 °C)  HR:  [85-93] 85  BP: (119-138)/(55-62) 127/58  Resp:  [17-20] 17  SpO2:  [89 %-90 %] 90 %    Physical Exam  Left lower extremity is neurovascularly intact  Toes are pink and mobile  Compartments are soft  Dressings are clean, dry and intact  Negative Homans  Brisk cap refill  Sensation intact  No  warmth erythema      Lab Results: I have reviewed the following results:  Recent Labs     12/08/24  0438 12/09/24  0527 12/10/24  0444   WBC 7.60 6.97 7.50   HGB 10.1* 10.2* 10.6*   HCT 32.0* 33.3* 33.6*    260 286   BUN 15 11 12   CREATININE 0.59* 0.51* 0.47*     Blood Culture:    Lab Results   Component Value Date    BLOODCX No Growth After 5 Days. 10/23/2024     Wound Culture:   Lab Results   Component Value Date    WOUNDCULT 1+ Growth of Klebsiella pneumoniae (A) 02/14/2024    WOUNDCULT 1+ Growth of Proteus vulgaris group (A) 02/14/2024    WOUNDCULT Few Colonies of 02/14/2024

## 2024-12-11 NOTE — OCCUPATIONAL THERAPY NOTE
Occupational Therapy Treatment Note     Patient Name: Lona Cedeno  Today's Date: 2024  Problem List  Principal Problem:    Closed left hip fracture, initial encounter ([facility])  Active Problems:    Type 2 diabetes mellitus with diabetic polyneuropathy, without long-term current use of insulin (HCC)    Essential hypertension    Anxiety and depression    PAD (peripheral artery disease) ([facility])    COPD, severity to be determined ([facility])    Hyperkalemia    Hyponatremia    Acute respiratory insufficiency    Preoperative clearance    SIRS (systemic inflammatory response syndrome) ([facility])    Urinary retention    Cardiomyopathy (HCC)    Past Medical History  Past Medical History:   Diagnosis Date    Anxiety     Closed fracture of coccyx ([facility]) 2023    COPD (chronic obstructive pulmonary disease) ([facility])     Diabetes (HCC)     Diabetes mellitus (HCC)     GERD (gastroesophageal reflux disease)     Hyperlipidemia     Hypertension     IBS (irritable bowel syndrome)     Peripheral arterial disease ([facility])     Shoulder fracture     Tobacco use      Past Surgical History  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;  Surgeon: Cristian Child MD;  [facility];  Service: Urology    EVACUATION OF HEMATOMA Right 2024    Procedure: EVACUATION/ DRAINAGE CHEST WALL  HEMATOMA;  Surgeon: Seth Hoyos MD;  [facility];  Service: Vascular    FL RETROGRADE PYELOGRAM  2023    HERNIA REPAIR      umbilicus w/ mesh    [facility] BYPASS W/VEIN  AXILLARY-FEMORAL Right 1/31/2024    Procedure: BYPASS AXILLO-FEMORAL, RIGHT WITH 8MM RINGED PTFE GRAFT;  Surgeon: Seth Hoyos MD;  [facility];  Service: Vascular    ND OPTX FEM SHFT FX W/INSJ IMED IMPLT W/WO SCREW Left 12/6/2024    Procedure: INSERTION NAIL IM FEMUR ANTEGRADE (TROCHANTERIC);  Surgeon: Rigo Prieto MD;  [facility];  Service: Orthopedics    SPLIT THICKNESS SKIN GRAFT Right 2/28/2024    Procedure: SKIN GRAFT SPLIT THICKNESS (STSG)  EXTREMITY, Wound vac dressing change;  Surgeon: Rigo Yeboah DPM;  [facility];  Service: Podiatry    WOUND DEBRIDEMENT Right 2/4/2024    Procedure: RIGHT WOUND AND ACHILLES DEBRIDEMENT FOOT/TOE (WASH OUT); PARTIAL AMPUTATION DISTAL 2ND TOE;  Surgeon: Aaron Montero DPM;  [facility];  Service: Podiatry    WOUND DEBRIDEMENT Right 2/14/2024    Procedure: DEBRIDEMENT RIGHT GROIN  WOUND AND WASHOUT, APPLICATION OF WOUND VAC;  Surgeon: Suly Muro DO;  [facility];  Service: Vascular    WOUND DEBRIDEMENT Right 2/19/2024    Procedure: DEBRIDEMENT LOWER EXTREMITY, washout;  Surgeon: Suly Muro DO;  [facility];  Service: Vascular        12/11/24 0944   OT Last Visit   OT Visit Date 12/11/24   Note Type   Note Type Treatment   Pain Assessment   Pain Assessment Tool 0-10   Pain Score 2  (2/10 at rest; 7/10 after activity)   Pain Location/Orientation Orientation: Left;Location: Hip   Pain Radiating Towards n/a   Pain Onset/Description Onset: Ongoing   Effect of Pain on Daily Activities mobility   Patient's Stated Pain Goal No pain   Hospital Pain Intervention(s) Medication (See MAR);Repositioned   Multiple Pain Sites No   Restrictions/Precautions   Weight Bearing Precautions Per Order Yes   LLE Weight Bearing Per Order WBAT   Braces or Orthoses   (none reported)   Other Precautions Chair Alarm;Bed Alarm;Fall Risk;Pain;O2  (2L O2 via NC)   Lifestyle   Autonomy Pt resides in one level home w/  "spouse; I with ADLs and IADLs at baseline; +    Reciprocal Relationships supportive family   Bed Mobility   Additional Comments DNT; pt seated EOB upon arrival and seated in recliner upon conclusion   Transfers   Sit to Stand 4  Minimal assistance   Additional items Assist x 2;Increased time required;Armrests   Stand to Sit 4  Minimal assistance   Additional items Assist x 2;Increased time required;Verbal cues   Stand pivot 3  Moderate assistance   Additional items Assist x 2;Increased time required;Verbal cues   Additional Comments RW used; VC for safe hand placement, proper body mechanics and overall RW management during directional turns. Pt required increased time and effort to attempt forward mobility d/t significant fear. Pt tolerated standing ~x5 mins, x2 trials with F balance   Therapeutic Excerise-Strength   UE Strength Yes   Right Upper Extremity- Strength   R Shoulder Flexion;Horizontal ABduction;Extension  (protraction/retraction)   R Elbow Elbow flexion;Elbow extension   R Position Seated   Equipment Dumbbell  (1#)   R Weight/Reps/Sets 1x15 reps   RUE Strength Comment Verbal and visual cues for exercise technique and sequence   Left Upper Extremity-Strength   L Shoulder Flexion;Extension  (protraction/retraction)   L Elbow Elbow flexion;Elbow extension   L Position Seated   Equipment Dumbell  (1#)   L Weights/Reps/Sets 1x15 reps   LUE Strength Comment Verbal and visual cues for exercise technique and sequence   Subjective   Subjective \"I can't do it, I am scared\"   Cognition   Overall Cognitive Status Impaired   Arousal/Participation Alert;Responsive;Cooperative   Attention Within functional limits   Orientation Level Oriented X4   Memory Within functional limits   Following Commands Follows all commands and directions without difficulty   Comments Pt agreeable to OT treatment   Activity Tolerance   Activity Tolerance Patient limited by fatigue;Patient limited by pain   Medical Staff Made Aware " Yes, nursing staff made aware of session outcomes   Assessment   Assessment Patient participated in Skilled OT session this date with interventions consisting of Energy Conservation techniques, safety awareness and fall prevention techniques, therapeutic exercise to: increase functional use of BUEs, increase BUE muscle strength ,  therapeutic activities to: increase activity tolerance, increase dynamic sit/ stand balance during functional activity , increase postural control, increase trunk control, and increase OOB/ sitting tolerance . Patient agreeable to OT treatment session, upon arrival patient was found seated at edge of bed.  In comparison to previous session, patient with improvements in functional transfers and activity tolerance . Patient requiring verbal cues for correct technique and frequent rest periods. Patient continues to be functioning below baseline level, occupational performance remains limited secondary to factors listed above and increased risk for falls and injury.   From OT standpoint, recommendation at time of d/c would with moderate intensity OT resources.   Patient to benefit from continued Occupational Therapy treatment while in the hospital to address deficits as defined above and maximize level of functional independence with ADLs and functional mobility.   Plan   Treatment Interventions ADL retraining;Functional transfer training;UE strengthening/ROM;Endurance training;Patient/family training;Energy conservation;Activityengagement   Goal Expiration Date 12/19/24   OT Treatment Day 2   OT Frequency 3-5x/wk   Discharge Recommendation   Rehab Resource Intensity Level, OT II (Moderate Resource Intensity)   Additional Comments  The patient's raw score on the AM-PAC Daily Activity inpatient short form is 17, standardized score is 37.26, less than 39.4. Patients at this level are likely to benefit from discharge with moderate intensity OT resources. Please refer to the recommendation of the  Occupational Therapist for safe discharge planning.   Additional Comments 2 Co treatment with PT completed secondary to complex medical condition of pt and A of 2 required to achieve and maintain transitional movements, requiring the need of skilled therapeutic intervention of 2 therapists to achieve delivery of services.   AM-PAC Daily Activity Inpatient   Lower Body Dressing 2   Bathing 2   Toileting 3   Upper Body Dressing 3   Grooming 3   Eating 4   Daily Activity Raw Score 17   Daily Activity Standardized Score (Calc for Raw Score >=11) 37.26   AM-PAC Applied Cognition Inpatient   Following a Speech/Presentation 3   Understanding Ordinary Conversation 4   Taking Medications 4   Remembering Where Things Are Placed or Put Away 2   Remembering List of 4-5 Errands 3   Taking Care of Complicated Tasks 3   Applied Cognition Raw Score 19   Applied Cognition Standardized Score 39.77     All needs met, call bell within reach  Shaneka Stark OTR/L

## 2024-12-11 NOTE — CASE MANAGEMENT
Case Management Discharge Planning Note    Patient name Lona Cedeno  Location /-01 MRN [id number]  : 1945 Date 2024       Current Admission Date: 2024  Current Admission Diagnosis:Closed left hip fracture, initial encounter ([facility])   Patient Active Problem List    Diagnosis Date Noted Date Diagnosed    Cardiomyopathy ([facility]) 12/10/2024     Abnormal EKG 2024     Urinary retention 2024     Closed left hip fracture, initial encounter ([facility]) 2024     Acute respiratory insufficiency 2024     Preoperative clearance 2024     SIRS (systemic inflammatory response syndrome) ([facility]) 2024     Acute encephalopathy 10/25/2024     Hyponatremia 10/24/2024     Open wound of right foot 2024     Hyperkalemia 2024     Acute metabolic encephalopathy 2024     Toxic encephalopathy 2024     Acute respiratory failure with hypoxia ([facility]) 2024     Sacral wound 2024     Other dietary vitamin B12 deficiency anemia 2024     Leukocytosis 2024     At risk for venous thromboembolism (VTE) 2024     At high risk for skin breakdown 2024     Wound of skin 2024     Impaired mobility and activities of daily living 2024     Anemia 2024     Constipation 2024     Age-related osteoporosis without current pathological fracture 2023     Abnormal CT of the chest 10/17/2023     COPD, severity to be determined ([facility]) 10/17/2023     Tobacco use disorder 10/17/2023     PAD (peripheral artery disease) ([facility]) 10/10/2023     Bladder neoplasm 2023     Left renal mass 2023     Hypomagnesemia 2023     Degenerative lumbar disc 2023     Urinary incontinence 2023     GERD without esophagitis 10/16/2019     Essential hypertension 02/15/2019     Anxiety and depression 02/15/2019     Type 2 diabetes mellitus with diabetic polyneuropathy, without long-term current use of insulin ([facility]) 2019        LOS (days): 7  Geometric Mean LOS (GMLOS) (days): 4.4  Days to GMLOS:-2.3     OBJECTIVE:  Risk of Unplanned Readmission Score: 39.54         Current admission status: Inpatient   Preferred Pharmacy:   [id number] - [place] - [address]  [address] [address]  Phone: [phone] Fax: [phone]    [facility] [place] [facility] [address]  [address] [facility] [place] [address]  Phone: [phone] Fax: [phone]    [facility] [place] - [address]  [address]  [place] [address]  Phone: [phone] Fax: [phone]    Primary Care Provider: Vivek Preston DO    Primary Insurance: [facility] REP  Secondary Insurance:     DISCHARGE DETAILS:   support has received a courtesy auth  for [facility].  Sent a message to [place] Support to determine if any additional information is needed for [facility].  A COVID swab has been ordered.

## 2024-12-11 NOTE — PHYSICAL THERAPY NOTE
PT Treatment Note    NAME:  Lona Cedeno  DATE: 12/11/24    AGE:   79 y.o.  Mrn:   [id number]  ADMIT DX:  Closed fracture of left hip, initial encounter ([facility]) [S72.002A]  Closed left hip fracture, initial encounter ([facility]) [S72.002A]  Performed at least 2 patient identifiers during session: Name and Epic photo       12/11/24 0910   PT Last Visit   PT Visit Date 12/11/24   Note Type   Note Type Treatment   Pain Assessment   Pain Assessment Tool 0-10   Pain Score 2   Pain Location/Orientation Location: Hip;Orientation: Left   Pain Onset/Description Onset: Ongoing   Patient's Stated Pain Goal No pain   Hospital Pain Intervention(s) Medication (See MAR);Repositioned   Restrictions/Precautions   Weight Bearing Precautions Per Order Yes   LLE Weight Bearing Per Order WBAT   Other Precautions Chair Alarm;Bed Alarm;Limb alert;O2;Fall Risk;Pain  (2 L)   Cognition   Overall Cognitive Status Impaired   Arousal/Participation Alert;Responsive;Cooperative   Attention Within functional limits   Orientation Level Oriented X4   Memory Within functional limits   Following Commands Follows all commands and directions without difficulty   Subjective   Subjective I want to be able to get up   Bed Mobility   Additional Comments Pt received for session sitting EOB   Transfers   Sit to Stand 4  Minimal assistance   Additional items Assist x 2;Increased time required;Armrests   Stand to Sit 4  Minimal assistance   Additional items Assist x 2;Increased time required;Verbal cues   Stand pivot 3  Moderate assistance   Additional items Assist x 2;Increased time required;Verbal cues   Additional Comments Attempted mobility, pt unable to ambulate or move right foot forward due to fear of bearing weight in her left foot, we were able to stay standing for 2 5 min periods   Ambulation/Elevation   Ambulation/Elevation Additional Comments Pt encouraged to perform ambualtion but was unable to due to constant fear and nervousness of falling.   Balance    Static Sitting Good   Dynamic Sitting Fair +   Static Standing Fair   Dynamic Standing Poor +   Ambulatory Poor +   Activity Tolerance   Activity Tolerance Patient limited by pain;Patient limited by fatigue   Exercises   Hip Adduction Sitting;10 reps;Bilateral   Knee AROM Long Arc Quad Sitting;15 reps;Bilateral   Ankle Pumps Sitting;15 reps;Bilateral   Marching Sitting;15 reps;AAROM;Bilateral   Assessment   Prognosis Good   Problem List Decreased strength;Decreased endurance;Impaired balance;Decreased mobility;Decreased range of motion;Orthopedic restrictions;Pain   Assessment Pt seen for PT treatment session this date with interventions consisting of gait training to normalize gait pattern to decrease fall risk, therapeutic activity to educate patient on safe transfers to progress as able, and therapeutic exercise to improve strength to improve functional mobility. Pt agreeable to PT treatment session upon arrival, pt found  sitting EOB , in no apparent distress, A&O x 4, and responsive. Since previous session, pt has made fair progress as evidenced by decreased assistance required with mobility  Barriers during this session include pain, anxiety, and fear.  Pt continues to be functioning below baseline level, and remains limited 2* factors listed above and including decreased strength, impaired activity tolerance, impaired  balance, pain, and impaired cognition.  Pt prognosis for achieving goals is good, pending pt progress with hospitalization/medical status improvements, and indicated by motivated to participate in therapy and ability to follow cues. PT will continue to see pt during current hospitalization in order to address the deficits listed above and provide interventions consistent w/ POC in effort to achieve goals. Current goals and POC remain appropriate, pt continues to have rehab potential  Upon conclusion pt seated OOB in recliner. The patient's AM-PAC Basic Mobility Inpatient Short Form Raw Score  is 12.  A Raw score of less than or equal to 16 suggests the patient may benefit from discharge to post-acute rehabilitation services. Based on patient presentations and impairments, pt would most appropriately benefit from Level 1 resource intensity upon discharge.  Please also refer to the recommendation of the Physical Therapist for safe discharge planning. RN verbalized pt appropriate for PT session.   Goals   Patient Goals To walk   LTG Expiration Date 12/17/24   PT Treatment Day 4   Plan   Treatment/Interventions Functional transfer training;LE strengthening/ROM;Elevations;Therapeutic exercise;Endurance training;Cognitive reorientation;Gait training   Progress Slow progress, medical status limitations   PT Frequency 3-5x/wk   Discharge Recommendation   Rehab Resource Intensity Level, PT I (Maximum Resource Intensity)   Equipment Recommended Walker   AM-PAC Basic Mobility Inpatient   Turning in Flat Bed Without Bedrails 3   Lying on Back to Sitting on Edge of Flat Bed Without Bedrails 2   Moving Bed to Chair 2   Standing Up From Chair Using Arms 2   Walk in Room 2   Climb 3-5 Stairs With Railing 1   Basic Mobility Inpatient Raw Score 12   Basic Mobility Standardized Score 32.23   [facility] Highest Level Of Mobility   -[facility] Goal 4: Move to chair/commode   -M Achieved 5: Stand (1 or more minutes)     Co treatment with OT secondary to complex medical condition of pt, possible A of 2 required to achieve and maintain transitional movements, requiring the need of skilled therapeutic intervention of 2 therapists to achieve delivery of services.      Time In: 0910  Time Out: 0953  Total Treatment Minutes: 43    Johnnie Carter, PT

## 2024-12-11 NOTE — ASSESSMENT & PLAN NOTE
Lab Results   Component Value Date    HGBA1C 7.4 (H) 10/24/2024       Recent Labs     12/09/24  2037 12/10/24  0734 12/10/24  1131 12/10/24  1616   POCGLU 174* 192* 259* 213*       Blood Sugar Average: Last 72 hrs:  (P) 198.6

## 2024-12-11 NOTE — ASSESSMENT & PLAN NOTE
Lab Results   Component Value Date    HGBA1C 7.4 (H) 10/24/2024       Recent Labs     12/10/24  1616 12/10/24  2107 12/11/24  0702 12/11/24  1049   POCGLU 213* 239* 181* 283*       Blood Sugar Average: Last 72 hrs:  (P) 209.5  Resume home diabetic regimen

## 2024-12-11 NOTE — ASSESSMENT & PLAN NOTE
-Newly discovered, EF 35%  Ischemic testing previously discussed with pt. And deferred due to pt wishes as she is not experiencing any cardiac symptoms.    Continue  medical therapy-lisinopril 5 mg daily and newly started metoprolol succinate 12.5 mg daily  -Patient previously with hyperkalemia and urinary retention-will hold off on aldosterone antagonist and SGLT2 inhibitor

## 2024-12-12 ENCOUNTER — TELEPHONE (OUTPATIENT)
Age: 79
End: 2024-12-12

## 2024-12-12 ENCOUNTER — TELEPHONE (OUTPATIENT)
Dept: CARDIOLOGY CLINIC | Facility: CLINIC | Age: 79
End: 2024-12-12

## 2024-12-12 ENCOUNTER — TELEPHONE (OUTPATIENT)
Dept: OBGYN CLINIC | Facility: CLINIC | Age: 79
End: 2024-12-12

## 2024-12-12 NOTE — TELEPHONE ENCOUNTER
Called and spoke w/Lona at [facility] and she will have Octaviano return my call when she gets off phone. Await CB.

## 2024-12-12 NOTE — TELEPHONE ENCOUNTER
Called and spoke wLane, Director at [facility] and he states that he just spoke to Dr Prieto about pt's medication and all is good.

## 2024-12-12 NOTE — TELEPHONE ENCOUNTER
[facility]     Doctor: Ida     Reason for call: Octaviano is calling to discuss the prescribed medication for the patient post surgery. States patient is on Lovenox and once that is complete to take baby aspirin, patient already takes Xarelto. Please return call.    Call back#: [phone]

## 2024-12-12 NOTE — TELEPHONE ENCOUNTER
[facility]    Doctor: Ida    Reason for call: Patient is there for rehab. They would like clarification of meds, they received conflicting instructions regarding this. Is the patient supposed to be on Lovenox  twice a day for four weeks then switching to a baby aspirin twice a day for four weeks?    Can someone call her to clarify    Call back#: [phone]

## 2024-12-12 NOTE — TELEPHONE ENCOUNTER
----- Message from Rigo Prieto MD sent at 12/12/2024  8:27 AM EST -----  Hi, this is the other hip fracture patient from last week. She also needs a follow up appointment for late next week. Thanks!

## 2024-12-12 NOTE — TELEPHONE ENCOUNTER
Caller: Octaviano / JONATHAN [facility]     Doctor: Ida     Reason for call: Please see previous message     Octaviano is returning call from [facility]     Call back#:  [phone]

## 2024-12-12 NOTE — TELEPHONE ENCOUNTER
----- Message from ROXANE [place] sent at 12/11/2024  9:57 AM EST -----  Will need hospital follow-up going to rehab  Previously saw Dr. Cespedes and would like appointment to be with him

## 2024-12-13 ENCOUNTER — LAB REQUISITION (OUTPATIENT)
Dept: LAB | Facility: HOSPITAL | Age: 79
End: 2024-12-13

## 2024-12-13 DIAGNOSIS — G93.41 METABOLIC ENCEPHALOPATHY: ICD-10-CM

## 2024-12-13 DIAGNOSIS — E87.5 HYPERKALEMIA: ICD-10-CM

## 2024-12-13 LAB
ANION GAP SERPL CALCULATED.3IONS-SCNC: 4 MMOL/L (ref 4–13)
BASOPHILS # BLD AUTO: 0.03 THOUSANDS/ÂΜL (ref 0–0.1)
BASOPHILS NFR BLD AUTO: 0 % (ref 0–1)
BUN SERPL-MCNC: 15 MG/DL (ref 5–25)
CALCIUM SERPL-MCNC: 9.6 MG/DL (ref 8.4–10.2)
CHLORIDE SERPL-SCNC: 99 MMOL/L (ref 96–108)
CO2 SERPL-SCNC: 34 MMOL/L (ref 21–32)
CREAT SERPL-MCNC: 0.55 MG/DL (ref 0.6–1.3)
EOSINOPHIL # BLD AUTO: 0.48 THOUSAND/ÂΜL (ref 0–0.61)
EOSINOPHIL NFR BLD AUTO: 6 % (ref 0–6)
ERYTHROCYTE [DISTWIDTH] IN BLOOD BY AUTOMATED COUNT: 14.6 % (ref 11.6–15.1)
GFR SERPL CREATININE-BSD FRML MDRD: 90 ML/MIN/1.73SQ M
GLUCOSE SERPL-MCNC: 179 MG/DL (ref 65–140)
HCT VFR BLD AUTO: 35.6 % (ref 34.8–46.1)
HGB BLD-MCNC: 10.7 G/DL (ref 11.5–15.4)
IMM GRANULOCYTES # BLD AUTO: 0.03 THOUSAND/UL (ref 0–0.2)
IMM GRANULOCYTES NFR BLD AUTO: 0 % (ref 0–2)
LYMPHOCYTES # BLD AUTO: 0.47 THOUSANDS/ÂΜL (ref 0.6–4.47)
LYMPHOCYTES NFR BLD AUTO: 6 % (ref 14–44)
MCH RBC QN AUTO: 29.1 PG (ref 26.8–34.3)
MCHC RBC AUTO-ENTMCNC: 30.1 G/DL (ref 31.4–37.4)
MCV RBC AUTO: 97 FL (ref 82–98)
MONOCYTES # BLD AUTO: 0.5 THOUSAND/ÂΜL (ref 0.17–1.22)
MONOCYTES NFR BLD AUTO: 7 % (ref 4–12)
NEUTROPHILS # BLD AUTO: 6.17 THOUSANDS/ÂΜL (ref 1.85–7.62)
NEUTS SEG NFR BLD AUTO: 81 % (ref 43–75)
NRBC BLD AUTO-RTO: 0 /100 WBCS
PLATELET # BLD AUTO: 316 THOUSANDS/UL (ref 149–390)
PMV BLD AUTO: 9.9 FL (ref 8.9–12.7)
POTASSIUM SERPL-SCNC: 4.6 MMOL/L (ref 3.5–5.3)
RBC # BLD AUTO: 3.68 MILLION/UL (ref 3.81–5.12)
SODIUM SERPL-SCNC: 137 MMOL/L (ref 135–147)
WBC # BLD AUTO: 7.68 THOUSAND/UL (ref 4.31–10.16)

## 2024-12-13 PROCEDURE — 85025 COMPLETE CBC W/AUTO DIFF WBC: CPT | Performed by: INTERNAL MEDICINE

## 2024-12-13 PROCEDURE — 80048 BASIC METABOLIC PNL TOTAL CA: CPT | Performed by: INTERNAL MEDICINE

## 2024-12-17 ENCOUNTER — TELEPHONE (OUTPATIENT)
Age: 79
End: 2024-12-17

## 2024-12-17 NOTE — TELEPHONE ENCOUNTER
Caller: [facility] [place]    Doctor: Ida    Reason for call: Wanted to let Dr Prieto know patient has cellulitis of left lower extremity and started doxycycline. Otherwise, patient will be at f/u on 12/19    Call back#: [phone]

## 2024-12-19 ENCOUNTER — APPOINTMENT (OUTPATIENT)
Dept: RADIOLOGY | Facility: CLINIC | Age: 79
End: 2024-12-19
Payer: COMMERCIAL

## 2024-12-19 ENCOUNTER — OFFICE VISIT (OUTPATIENT)
Dept: OBGYN CLINIC | Facility: CLINIC | Age: 79
End: 2024-12-19

## 2024-12-19 ENCOUNTER — LAB REQUISITION (OUTPATIENT)
Dept: LAB | Facility: HOSPITAL | Age: 79
End: 2024-12-19

## 2024-12-19 VITALS — BODY MASS INDEX: 35.3 KG/M2 | WEIGHT: 187 LBS | HEIGHT: 61 IN

## 2024-12-19 DIAGNOSIS — E87.5 HYPERKALEMIA: ICD-10-CM

## 2024-12-19 DIAGNOSIS — M25.552 PAIN IN LEFT HIP: ICD-10-CM

## 2024-12-19 DIAGNOSIS — E87.1 HYPO-OSMOLALITY AND HYPONATREMIA: ICD-10-CM

## 2024-12-19 DIAGNOSIS — Z87.81 S/P LEFT HIP FRACTURE: Primary | ICD-10-CM

## 2024-12-19 LAB
ANION GAP SERPL CALCULATED.3IONS-SCNC: 7 MMOL/L (ref 4–13)
BASOPHILS # BLD AUTO: 0.05 THOUSANDS/ÂΜL (ref 0–0.1)
BASOPHILS NFR BLD AUTO: 1 % (ref 0–1)
BUN SERPL-MCNC: 10 MG/DL (ref 5–25)
CALCIUM SERPL-MCNC: 10 MG/DL (ref 8.4–10.2)
CHLORIDE SERPL-SCNC: 102 MMOL/L (ref 96–108)
CO2 SERPL-SCNC: 31 MMOL/L (ref 21–32)
CREAT SERPL-MCNC: 0.55 MG/DL (ref 0.6–1.3)
EOSINOPHIL # BLD AUTO: 0.44 THOUSAND/ÂΜL (ref 0–0.61)
EOSINOPHIL NFR BLD AUTO: 5 % (ref 0–6)
ERYTHROCYTE [DISTWIDTH] IN BLOOD BY AUTOMATED COUNT: 14.9 % (ref 11.6–15.1)
GFR SERPL CREATININE-BSD FRML MDRD: 90 ML/MIN/1.73SQ M
GLUCOSE SERPL-MCNC: 127 MG/DL (ref 65–140)
HCT VFR BLD AUTO: 37 % (ref 34.8–46.1)
HGB BLD-MCNC: 11.1 G/DL (ref 11.5–15.4)
IMM GRANULOCYTES # BLD AUTO: 0.02 THOUSAND/UL (ref 0–0.2)
IMM GRANULOCYTES NFR BLD AUTO: 0 % (ref 0–2)
LYMPHOCYTES # BLD AUTO: 0.84 THOUSANDS/ÂΜL (ref 0.6–4.47)
LYMPHOCYTES NFR BLD AUTO: 9 % (ref 14–44)
MCH RBC QN AUTO: 29.6 PG (ref 26.8–34.3)
MCHC RBC AUTO-ENTMCNC: 30 G/DL (ref 31.4–37.4)
MCV RBC AUTO: 99 FL (ref 82–98)
MONOCYTES # BLD AUTO: 0.53 THOUSAND/ÂΜL (ref 0.17–1.22)
MONOCYTES NFR BLD AUTO: 6 % (ref 4–12)
NEUTROPHILS # BLD AUTO: 7.21 THOUSANDS/ÂΜL (ref 1.85–7.62)
NEUTS SEG NFR BLD AUTO: 79 % (ref 43–75)
NRBC BLD AUTO-RTO: 0 /100 WBCS
PLATELET # BLD AUTO: 428 THOUSANDS/UL (ref 149–390)
PMV BLD AUTO: 10.4 FL (ref 8.9–12.7)
POTASSIUM SERPL-SCNC: 4.1 MMOL/L (ref 3.5–5.3)
RBC # BLD AUTO: 3.75 MILLION/UL (ref 3.81–5.12)
SODIUM SERPL-SCNC: 140 MMOL/L (ref 135–147)
WBC # BLD AUTO: 9.09 THOUSAND/UL (ref 4.31–10.16)

## 2024-12-19 PROCEDURE — 73502 X-RAY EXAM HIP UNI 2-3 VIEWS: CPT

## 2024-12-19 PROCEDURE — 80048 BASIC METABOLIC PNL TOTAL CA: CPT | Performed by: INTERNAL MEDICINE

## 2024-12-19 PROCEDURE — 85025 COMPLETE CBC W/AUTO DIFF WBC: CPT | Performed by: INTERNAL MEDICINE

## 2024-12-19 PROCEDURE — 99024 POSTOP FOLLOW-UP VISIT: CPT | Performed by: STUDENT IN AN ORGANIZED HEALTH CARE EDUCATION/TRAINING PROGRAM

## 2024-12-19 NOTE — PROGRESS NOTES
Hip Post Operative Visit     Assesment:   79 y.o. female s/p IM nail left femur, DOS: 12/6/2024    Plan:  Reviewed history, physical exam, and imaging with the patient at time of visit. The patient is doing well 2 week s/p the above procedure. The surgical dressings and staples were removed at time of visit and Steri strips were applied. There is no evidence of erythema, dehiscence, or drainage. The patient may expose the incision for showering, however, she was advised not to submerge or scrub the incision. X-rays of the left hip today revealed stable hardware with no acute fracture or infection. The patient's pain is well controlled. Patient is currently weight bearing as tolerated, but presented in a wheelchair today.  On exam, the left hip revealed 2 well healed surgical incisions. Patient is currently attending physical therapy. The patient was advised to continue 81mg aspirin and Eliquis for DVT prophylaxis. She will follow-up in 4 week for re-evaluation and incision check. The patient demonstrated understanding of the discussion and was in agreement with the plan. All of the questions were answered. Patient can reach out to clinic with any questions or concerns at any time.      Follow up:   4 weeks with repeat X-rays    Patient was advised that if they have any fevers, chills, chest pain, shortness of breath, redness or drainage from the incision, please let our office know immediately.        Chief Complaint   Patient presents with    Left Hip - Pain, Post-op       History of Present Illness:    The patient is a 79 y.o. female who is being evaluated post operatively 2 weeks status post IM nail left femur.    Patient is currently attending physical therapy and able to walk with a walker. She still requires assistance with standing but is able to bear weight on the left lower extremity without issue. She states her pain is improving but still having pain while laying down in bed and in the morning. Her pain  "is located at the surgical incision. Patient is still taking antibiotics for cellulitis of the foot.       The patient denies any fevers, chills, calf pain, chest pain/shortness of breath, redness or drainage from the incision.       I have reviewed the past medical, surgical, social and family history, medications and allergies as documented in the EMR.      Review of systems: ROS is negative other than that noted in the HPI.  Constitutional: Negative for fatigue and fever.        Physical Exam:    Height 5' 1\" (1.549 m), weight 84.8 kg (187 lb).    General/Constitutional: NAD, well developed, well nourished  HENT: Normocephalic, atraumatic  CV: Intact distal pulses, regular rate  Resp: No respiratory distress or labored breathing  Lymphatic: No lymphadenopathy palpated  Neuro: Alert and Oriented x 3, no focal deficits  Psych: Normal mood, normal affect, normal judgement, normal behavior  Skin: Warm, dry, no rashes, no erythema      left Hip Exam (focused):  Incisions show no erythema, no drainage, no dehiscence. Staples intact.  Staples removed and replaced with Steri-Strips.  Hip ROM: pain free circumduction of the hip  Swelling: negative  Tenderness to palpation: none  Resolving cellulitis in lower extremity and foot     LE NV Exam:   +2 DP/PT pulses   SILT in the SPN, DPN, sural, saphenous, tibial nerve distributions  SILT L2-S1  Motor intact in the SPN, DPN, tibial nerve distributions    No calf tenderness to palpation bilaterally      Imaging:  X-rays of the left hip were obtained on 12/19/2024 and reviewed with the patient.  Per my independent review, the imaging shows anatomic alignment of the fracture with hardware in appropriate position.  There is no evidence of hardware complication.        Scribe Attestation      I,:  Frederick Castellon PA-C am acting as a scribe while in the presence of the attending physician.:       I,:  Rigo Prieto MD personally performed the services described in this " documentation    as scribed in my presence.:

## 2025-01-10 ENCOUNTER — HOME HEALTH ADMISSION (OUTPATIENT)
Dept: HOME HEALTH SERVICES | Facility: HOME HEALTHCARE | Age: 80
End: 2025-01-10
Payer: COMMERCIAL

## 2025-01-14 ENCOUNTER — HOME CARE VISIT (OUTPATIENT)
Dept: HOME HEALTH SERVICES | Facility: HOME HEALTHCARE | Age: 80
End: 2025-01-14
Payer: COMMERCIAL

## 2025-01-14 VITALS
RESPIRATION RATE: 18 BRPM | HEART RATE: 72 BPM | SYSTOLIC BLOOD PRESSURE: 146 MMHG | DIASTOLIC BLOOD PRESSURE: 76 MMHG | OXYGEN SATURATION: 92 % | TEMPERATURE: 97.6 F

## 2025-01-14 PROCEDURE — G0299 HHS/HOSPICE OF RN EA 15 MIN: HCPCS

## 2025-01-14 PROCEDURE — 400013 VN SOC

## 2025-01-15 ENCOUNTER — HOME CARE VISIT (OUTPATIENT)
Dept: HOME HEALTH SERVICES | Facility: HOME HEALTHCARE | Age: 80
End: 2025-01-15
Payer: COMMERCIAL

## 2025-01-15 VITALS — SYSTOLIC BLOOD PRESSURE: 120 MMHG | HEART RATE: 80 BPM | OXYGEN SATURATION: 95 % | DIASTOLIC BLOOD PRESSURE: 64 MMHG

## 2025-01-15 PROCEDURE — G0151 HHCP-SERV OF PT,EA 15 MIN: HCPCS

## 2025-01-17 ENCOUNTER — HOME CARE VISIT (OUTPATIENT)
Dept: HOME HEALTH SERVICES | Facility: HOME HEALTHCARE | Age: 80
End: 2025-01-17
Payer: COMMERCIAL

## 2025-01-17 VITALS — HEART RATE: 79 BPM | OXYGEN SATURATION: 96 % | DIASTOLIC BLOOD PRESSURE: 78 MMHG | SYSTOLIC BLOOD PRESSURE: 130 MMHG

## 2025-01-17 PROCEDURE — G0151 HHCP-SERV OF PT,EA 15 MIN: HCPCS

## 2025-01-17 NOTE — CASE COMMUNICATION
PT initial evaluation completed.  Plan to see 2xwk x 4wks to address functional deficits including strengthening ble, gait/transfer/stair training, safety education.

## 2025-01-20 ENCOUNTER — HOME CARE VISIT (OUTPATIENT)
Dept: HOME HEALTH SERVICES | Facility: HOME HEALTHCARE | Age: 80
End: 2025-01-20
Payer: COMMERCIAL

## 2025-01-20 VITALS — DIASTOLIC BLOOD PRESSURE: 78 MMHG | HEART RATE: 85 BPM | OXYGEN SATURATION: 93 % | SYSTOLIC BLOOD PRESSURE: 140 MMHG

## 2025-01-20 PROCEDURE — G0152 HHCP-SERV OF OT,EA 15 MIN: HCPCS | Performed by: OCCUPATIONAL THERAPIST

## 2025-01-20 NOTE — CASE COMMUNICATION
[facility] OT evaluation 1/20/25    OT to continue 2x per wk x  3 wks for therapeutic exercises, ADL training, review of safety with transfers/mobility and recommendations for DME to maximize safety with performance of self care/ daily activities.

## 2025-01-21 ENCOUNTER — HOME CARE VISIT (OUTPATIENT)
Dept: HOME HEALTH SERVICES | Facility: HOME HEALTHCARE | Age: 80
End: 2025-01-21
Payer: COMMERCIAL

## 2025-01-22 ENCOUNTER — HOME CARE VISIT (OUTPATIENT)
Dept: HOME HEALTH SERVICES | Facility: HOME HEALTHCARE | Age: 80
End: 2025-01-22
Payer: COMMERCIAL

## 2025-01-22 VITALS — OXYGEN SATURATION: 93 % | HEART RATE: 90 BPM | DIASTOLIC BLOOD PRESSURE: 58 MMHG | SYSTOLIC BLOOD PRESSURE: 118 MMHG

## 2025-01-22 PROCEDURE — G0151 HHCP-SERV OF PT,EA 15 MIN: HCPCS

## 2025-01-22 PROCEDURE — G0299 HHS/HOSPICE OF RN EA 15 MIN: HCPCS

## 2025-01-23 ENCOUNTER — HOME CARE VISIT (OUTPATIENT)
Dept: HOME HEALTH SERVICES | Facility: HOME HEALTHCARE | Age: 80
End: 2025-01-23
Payer: COMMERCIAL

## 2025-01-23 VITALS — DIASTOLIC BLOOD PRESSURE: 70 MMHG | OXYGEN SATURATION: 94 % | HEART RATE: 76 BPM | SYSTOLIC BLOOD PRESSURE: 114 MMHG

## 2025-01-23 VITALS
HEART RATE: 99 BPM | SYSTOLIC BLOOD PRESSURE: 104 MMHG | RESPIRATION RATE: 20 BRPM | TEMPERATURE: 98.5 F | OXYGEN SATURATION: 95 % | DIASTOLIC BLOOD PRESSURE: 62 MMHG

## 2025-01-23 VITALS — HEART RATE: 84 BPM | OXYGEN SATURATION: 94 % | SYSTOLIC BLOOD PRESSURE: 122 MMHG | DIASTOLIC BLOOD PRESSURE: 78 MMHG

## 2025-01-23 PROCEDURE — G0151 HHCP-SERV OF PT,EA 15 MIN: HCPCS

## 2025-01-23 PROCEDURE — G0152 HHCP-SERV OF OT,EA 15 MIN: HCPCS | Performed by: OCCUPATIONAL THERAPIST

## 2025-01-24 NOTE — CASE COMMUNICATION
Patient discharged from sn services with all sn goals met. Pt continues to receive PT and OT services.

## 2025-01-27 ENCOUNTER — HOME CARE VISIT (OUTPATIENT)
Dept: HOME HEALTH SERVICES | Facility: HOME HEALTHCARE | Age: 80
End: 2025-01-27
Payer: COMMERCIAL

## 2025-01-27 VITALS — HEART RATE: 89 BPM | OXYGEN SATURATION: 95 % | DIASTOLIC BLOOD PRESSURE: 86 MMHG | SYSTOLIC BLOOD PRESSURE: 164 MMHG

## 2025-01-27 PROCEDURE — G0151 HHCP-SERV OF PT,EA 15 MIN: HCPCS

## 2025-01-28 ENCOUNTER — HOME CARE VISIT (OUTPATIENT)
Dept: HOME HEALTH SERVICES | Facility: HOME HEALTHCARE | Age: 80
End: 2025-01-28
Payer: COMMERCIAL

## 2025-01-28 VITALS — DIASTOLIC BLOOD PRESSURE: 60 MMHG | SYSTOLIC BLOOD PRESSURE: 120 MMHG | OXYGEN SATURATION: 91 % | HEART RATE: 81 BPM

## 2025-01-28 PROCEDURE — G0152 HHCP-SERV OF OT,EA 15 MIN: HCPCS | Performed by: OCCUPATIONAL THERAPIST

## 2025-01-28 NOTE — CASE COMMUNICATION
Patient notes continued soreness in her left shoulder however discomfort 5/10 which is less than it had been.  Patient notes after switching from heat to ice her discomfort is better controlled  Patient notes currently managing her discomfort with use of ice and Tylenol however has oxycodone which she does not prefer to use.  OT discussed consideration for use of Voltaren gel and encourages patient to contact Dr Preston's office to se e if Dr Preston  would be agreeable for her to use of Voltaren    Dr. Preston, Please advise if you are agreeable for patient to trial voltaren Gel.    Thanks,  Noe Overton MSOT.

## 2025-01-29 ENCOUNTER — HOME CARE VISIT (OUTPATIENT)
Dept: HOME HEALTH SERVICES | Facility: HOME HEALTHCARE | Age: 80
End: 2025-01-29
Payer: COMMERCIAL

## 2025-01-29 VITALS — SYSTOLIC BLOOD PRESSURE: 156 MMHG | HEART RATE: 82 BPM | OXYGEN SATURATION: 92 % | DIASTOLIC BLOOD PRESSURE: 72 MMHG

## 2025-01-29 PROCEDURE — G0151 HHCP-SERV OF PT,EA 15 MIN: HCPCS

## 2025-01-30 ENCOUNTER — HOME CARE VISIT (OUTPATIENT)
Dept: HOME HEALTH SERVICES | Facility: HOME HEALTHCARE | Age: 80
End: 2025-01-30
Payer: COMMERCIAL

## 2025-01-30 VITALS — HEART RATE: 82 BPM | SYSTOLIC BLOOD PRESSURE: 118 MMHG | DIASTOLIC BLOOD PRESSURE: 80 MMHG | OXYGEN SATURATION: 93 %

## 2025-01-30 PROCEDURE — G0152 HHCP-SERV OF OT,EA 15 MIN: HCPCS | Performed by: OCCUPATIONAL THERAPIST

## 2025-02-03 ENCOUNTER — HOME CARE VISIT (OUTPATIENT)
Dept: HOME HEALTH SERVICES | Facility: HOME HEALTHCARE | Age: 80
End: 2025-02-03
Payer: COMMERCIAL

## 2025-02-03 VITALS — HEART RATE: 68 BPM | SYSTOLIC BLOOD PRESSURE: 150 MMHG | OXYGEN SATURATION: 92 % | DIASTOLIC BLOOD PRESSURE: 72 MMHG

## 2025-02-03 PROCEDURE — G0151 HHCP-SERV OF PT,EA 15 MIN: HCPCS

## 2025-02-03 NOTE — CASE COMMUNICATION
"Patient progressing with [facility] PT and plan discharge to [facility] later this week. Home egress has not yet been accomplished initially due to difficulty WB but most recently due to weather. Steps have only been practiced on 6 inch platform. Patient apprehensive about going to outpatient for her own safety and due to 's visual issues and need to leave him home alone. She has not yet returned to use of SPC.  I would recommend \"[facility]\" who can provide outpatient therapy in the patient's home to begin starting next week. Patient in agreement with plan.  General goals of further strengthening, progression to SPC.  Dr Prieto, if you agree please send script to   [facility], Fax [phone].  "

## 2025-02-04 ENCOUNTER — HOME CARE VISIT (OUTPATIENT)
Dept: HOME HEALTH SERVICES | Facility: HOME HEALTHCARE | Age: 80
End: 2025-02-04
Payer: COMMERCIAL

## 2025-02-04 VITALS — SYSTOLIC BLOOD PRESSURE: 130 MMHG | OXYGEN SATURATION: 92 % | HEART RATE: 71 BPM | DIASTOLIC BLOOD PRESSURE: 80 MMHG

## 2025-02-04 DIAGNOSIS — Z87.81 S/P LEFT HIP FRACTURE: Primary | ICD-10-CM

## 2025-02-04 PROCEDURE — G0152 HHCP-SERV OF OT,EA 15 MIN: HCPCS | Performed by: OCCUPATIONAL THERAPIST

## 2025-02-05 ENCOUNTER — HOME CARE VISIT (OUTPATIENT)
Dept: HOME HEALTH SERVICES | Facility: HOME HEALTHCARE | Age: 80
End: 2025-02-05
Payer: COMMERCIAL

## 2025-02-05 ENCOUNTER — TELEPHONE (OUTPATIENT)
Dept: OBGYN CLINIC | Facility: CLINIC | Age: 80
End: 2025-02-05

## 2025-02-05 VITALS — HEART RATE: 60 BPM | SYSTOLIC BLOOD PRESSURE: 122 MMHG | OXYGEN SATURATION: 95 % | DIASTOLIC BLOOD PRESSURE: 72 MMHG

## 2025-02-05 PROCEDURE — G0151 HHCP-SERV OF PT,EA 15 MIN: HCPCS

## 2025-02-05 NOTE — CASE COMMUNICATION
"Patient is discharged from [facility] PT. [facility] OT to complete agency discharge tomorrow and patient to call to set up \"[facility]\" for assist in further progression.  She is indep in current HEP and with RW for home mobility. Incision is healed. She demo stairs to exit home with supervision.   "

## 2025-02-06 ENCOUNTER — HOME CARE VISIT (OUTPATIENT)
Dept: HOME HEALTH SERVICES | Facility: HOME HEALTHCARE | Age: 80
End: 2025-02-06
Payer: COMMERCIAL

## 2025-02-06 VITALS — HEART RATE: 65 BPM | DIASTOLIC BLOOD PRESSURE: 80 MMHG | SYSTOLIC BLOOD PRESSURE: 138 MMHG | OXYGEN SATURATION: 93 %

## 2025-02-06 PROCEDURE — G0152 HHCP-SERV OF OT,EA 15 MIN: HCPCS | Performed by: OCCUPATIONAL THERAPIST

## 2025-02-07 NOTE — CASE COMMUNICATION
OT performed discharge from [facility] OT and [facility] services today per plan of care.  Patient notes plans to initiate outpatient physical therapy with [facility] next week.   OT assisted patient with therapeutic exercise, ADL training, review of safety with transfer / mobility and assistance with recommendation for DME to maximize safety with performance of self care/ daily activities.  No additional [facility] OT services are pl anned at this time as patient is to be discharged with OT goals met and plans to initiate outpatient therapy.

## 2025-02-07 NOTE — TELEPHONE ENCOUNTER
Kellee Syed, this is Ed from [facility]. I'm calling you back in regards to a patient that you referred to us, Lona Cedeno, date of birth [id number]. I did receive the additional information that was faxed over and unfortunately we don't take her insurance, [employer] managed care or whatever it is the Medicare Advantage. And as a rule of thumb, we really don't take any Medicare Advantage because of the paperwork and low reimbursement. It just doesn't. It just doesn't make. It's just not good for us. So I just wanted to get back to you on that. Sorry about that. If you have any other questions, just give me a buzz. OK, Thanks. Bye.

## 2025-02-20 ENCOUNTER — OFFICE VISIT (OUTPATIENT)
Dept: OBGYN CLINIC | Facility: CLINIC | Age: 80
End: 2025-02-20

## 2025-02-20 ENCOUNTER — APPOINTMENT (OUTPATIENT)
Dept: RADIOLOGY | Facility: CLINIC | Age: 80
End: 2025-02-20
Payer: COMMERCIAL

## 2025-02-20 VITALS — BODY MASS INDEX: 35.3 KG/M2 | HEIGHT: 61 IN | WEIGHT: 187 LBS

## 2025-02-20 DIAGNOSIS — Z87.81 S/P LEFT HIP FRACTURE: ICD-10-CM

## 2025-02-20 DIAGNOSIS — Z87.81 S/P LEFT HIP FRACTURE: Primary | ICD-10-CM

## 2025-02-20 PROCEDURE — 73502 X-RAY EXAM HIP UNI 2-3 VIEWS: CPT

## 2025-02-20 PROCEDURE — 99024 POSTOP FOLLOW-UP VISIT: CPT | Performed by: STUDENT IN AN ORGANIZED HEALTH CARE EDUCATION/TRAINING PROGRAM

## 2025-02-20 NOTE — PROGRESS NOTES
Hip Post Operative Visit     Assesment:   79 y.o. female s/p IM nail left femur, DOS: 12/6/2024    Plan:  Patient is overall doing well 11 weeks status post above procedure.  They reviewed the patient's radiographs from today which show stable alignment of the fracture with interval healing and no hardware complications.  Patient denies any hip pain.  Incisions are well-healed with no signs of infection.  He is ambulating with walker but is starting to transition to a cane.  She is returned home.  She is now doing a home exercise program and has completed physical therapy.  I recommend the patient continue ambulating to build up strength.  I discussed that she could continue to work on transitioning from a walker to a cane.  I recommended she follow-up in 6 with repeat evaluation. The patient demonstrated understanding of the discussion and was in agreement with the plan.  All of the questions were answered.  Patient can reach out to clinic with any questions or concerns at any time.    Follow up:   6 weeks with repeat X-rays of the left hip    Patient was advised that if they have any fevers, chills, chest pain, shortness of breath, redness or drainage from the incision, please let our office know immediately.        Chief Complaint   Patient presents with    Left Hip - Post-op       History of Present Illness:  The patient is a 79 y.o. female who is being evaluated post operatively 11 weeks status post IM nail left femur.    Patient states that overall she is doing well.  She is now at home.  She has transition from physical therapy to home exercise program.  She is ambulating with a walker as well as a cane at times.  She states that she not have any pain in the hip.      The patient denies any fevers, chills, calf pain, chest pain/shortness of breath, redness or drainage from the incision.       I have reviewed the past medical, surgical, social and family history, medications and allergies as documented in the  "EMR.      Review of systems: ROS is negative other than that noted in the HPI.  Constitutional: Negative for fatigue and fever.        Physical Exam:    Height 5' 1\" (1.549 m), weight 84.8 kg (187 lb).    General/Constitutional: NAD, well developed, well nourished  HENT: Normocephalic, atraumatic  CV: Intact distal pulses, regular rate  Resp: No respiratory distress or labored breathing  Lymphatic: No lymphadenopathy palpated  Neuro: Alert and Oriented x 3, no focal deficits  Psych: Normal mood, normal affect, normal judgement, normal behavior  Skin: Warm, dry, no rashes, no erythema      left Hip Exam (focused):  Incisions well-healed with no erythema, no drainage, no dehiscence.   Hip ROM: pain free circumduction of the hip  Swelling: negative  Tenderness to palpation: none     LE NV Exam:   +2 DP/PT pulses   SILT in the SPN, DPN, sural, saphenous, tibial nerve distributions  SILT L2-S1  Motor intact in the SPN, DPN, tibial nerve distributions    No calf tenderness to palpation bilaterally      Imaging:  X-rays of the left hip were obtained on 2/20/2025 and reviewed with the patient.  Per my independent review, the imaging shows anatomic alignment of the fracture with hardware in appropriate position.  There is no evidence of hardware complication.        Scribe Attestation      I,:  Frederick Castellon PA-C am acting as a scribe while in the presence of the attending physician.:       I,:  Rigo Prieto MD personally performed the services described in this documentation    as scribed in my presence.:            "

## 2025-02-23 DIAGNOSIS — I10 ESSENTIAL HYPERTENSION: ICD-10-CM

## 2025-02-26 RX ORDER — AMILORIDE HYDROCHLORIDE 5 MG/1
5 TABLET ORAL DAILY
Qty: 90 TABLET | Refills: 0 | OUTPATIENT
Start: 2025-02-26

## 2025-03-02 DIAGNOSIS — I10 ESSENTIAL HYPERTENSION: ICD-10-CM

## 2025-03-04 RX ORDER — AMILORIDE HYDROCHLORIDE 5 MG/1
5 TABLET ORAL DAILY
Qty: 90 TABLET | Refills: 5 | OUTPATIENT
Start: 2025-03-04

## 2025-03-04 NOTE — TELEPHONE ENCOUNTER
The original prescription was discontinued on 2/13/2024 by Kurt Meier MD. Renewing this prescription may not be appropriate.

## 2025-03-06 DIAGNOSIS — F32.A ANXIETY AND DEPRESSION: ICD-10-CM

## 2025-03-06 DIAGNOSIS — F41.9 ANXIETY AND DEPRESSION: ICD-10-CM

## 2025-03-06 NOTE — TELEPHONE ENCOUNTER
Reason for call:   [x] Refill   [] Prior Auth  [] Other:     Office:   [x] PCP/Provider -   [] Specialty/Provider -     Medication: clonazePAM (KlonoPIN) 1 mg tablet     Dose/Frequency: TAKE ONE TABLET BY MOUTH IN THE EVENING     Quantity: 30    Pharmacy: [facility]   Does the patient have enough for 3 days?   [] Yes   [x] No - Send as HP to POD    Mail Away Pharmacy   Does the patient have enough for 10 days?   [] Yes   [] No - Send as HP to POD

## 2025-03-07 RX ORDER — CLONAZEPAM 1 MG/1
1 TABLET ORAL EVERY EVENING
Qty: 30 TABLET | Refills: 5 | Status: SHIPPED | OUTPATIENT
Start: 2025-03-07

## 2025-03-10 DIAGNOSIS — E11.42 TYPE 2 DIABETES MELLITUS WITH DIABETIC POLYNEUROPATHY, WITHOUT LONG-TERM CURRENT USE OF INSULIN (HCC): Chronic | ICD-10-CM

## 2025-03-11 RX ORDER — PREGABALIN 200 MG/1
200 CAPSULE ORAL 3 TIMES DAILY
Qty: 90 CAPSULE | Refills: 0 | Status: SHIPPED | OUTPATIENT
Start: 2025-03-11

## 2025-03-18 ENCOUNTER — TELEPHONE (OUTPATIENT)
Age: 80
End: 2025-03-18

## 2025-03-18 DIAGNOSIS — I10 ESSENTIAL HYPERTENSION: Primary | Chronic | ICD-10-CM

## 2025-03-18 RX ORDER — AMILORIDE HYDROCHLORIDE 5 MG/1
5 TABLET ORAL DAILY
Qty: 30 TABLET | Refills: 0 | Status: SHIPPED | OUTPATIENT
Start: 2025-03-18

## 2025-03-18 NOTE — TELEPHONE ENCOUNTER
Pt called requesting a refill for amiloride HCL 5 mg daily not on current med list. Pt states she has not had it filled since October 2024, does not know what it is for and if she should still be taking it.    Please advise    Pharmacy: [facility]

## 2025-03-18 NOTE — TELEPHONE ENCOUNTER
Pt called asking for this med but its not on her med list as an active med  If appropriate, can you please send med

## 2025-03-22 NOTE — TELEPHONE ENCOUNTER
Received a call from Omar stating that Dr. Gann would like [facility] set up for patient also that her dressing was changed on 5/30/24 at Dr. Salgado office    22-Mar-2025 19:05

## 2025-03-23 DIAGNOSIS — R32 URINARY INCONTINENCE, UNSPECIFIED TYPE: ICD-10-CM

## 2025-03-25 RX ORDER — SOLIFENACIN SUCCINATE 5 MG/1
5 TABLET, FILM COATED ORAL DAILY
Qty: 90 TABLET | Refills: 1 | Status: SHIPPED | OUTPATIENT
Start: 2025-03-25

## 2025-03-27 ENCOUNTER — TELEMEDICINE (OUTPATIENT)
Dept: FAMILY MEDICINE CLINIC | Facility: CLINIC | Age: 80
End: 2025-03-27
Payer: COMMERCIAL

## 2025-03-27 DIAGNOSIS — R65.10 SIRS (SYSTEMIC INFLAMMATORY RESPONSE SYNDROME) (HCC): ICD-10-CM

## 2025-03-27 DIAGNOSIS — I42.9 CARDIOMYOPATHY, UNSPECIFIED TYPE (HCC): ICD-10-CM

## 2025-03-27 DIAGNOSIS — J96.01 ACUTE RESPIRATORY FAILURE WITH HYPOXIA (HCC): ICD-10-CM

## 2025-03-27 DIAGNOSIS — E11.42 TYPE 2 DIABETES MELLITUS WITH DIABETIC POLYNEUROPATHY, WITHOUT LONG-TERM CURRENT USE OF INSULIN (HCC): Primary | ICD-10-CM

## 2025-03-27 DIAGNOSIS — I10 ESSENTIAL HYPERTENSION: Chronic | ICD-10-CM

## 2025-03-27 PROCEDURE — 99214 OFFICE O/P EST MOD 30 MIN: CPT | Performed by: FAMILY MEDICINE

## 2025-03-27 PROCEDURE — G2211 COMPLEX E/M VISIT ADD ON: HCPCS | Performed by: FAMILY MEDICINE

## 2025-03-27 NOTE — ASSESSMENT & PLAN NOTE
No worsening weight gain shortness of breath or heart issues she will stay on all medications without change low-dose aspirin along with metoprolol

## 2025-03-27 NOTE — ASSESSMENT & PLAN NOTE
Diabetes stable by last A1c at 7.4 she will continue with her same medication regimen repeat all lab work with A1c prior to the appointment in June follow diabetic diet reevaluate at that time    Lab Results   Component Value Date    HGBA1C 7.4 (H) 10/24/2024       Orders:    Comprehensive metabolic panel; Future    CBC and differential; Future    Lipid panel; Future    TSH, 3rd generation with Free T4 reflex; Future    Hemoglobin A1C; Future

## 2025-03-27 NOTE — ASSESSMENT & PLAN NOTE
Blood pressure has been stable patient continues the same medications at home checks her blood pressure and overall has been recovering from her hospitalization and multiple problems including pneumonia and respiratory failure.  Breathing has improved she is off oxygen other than at night she will start walking outdoors more with her rolling walker and follow-up with me in June

## 2025-03-27 NOTE — ASSESSMENT & PLAN NOTE
Stable overall regaining strength has less pain and is using a rolling walker working on strengthening and endurance and will follow-up with me in June oxygen is used at night only now

## 2025-03-27 NOTE — ASSESSMENT & PLAN NOTE
O2 saturations have been stable she uses oxygen at night at this point and will begin walking and moving more as weather improves and follow-up with me in Day

## 2025-03-27 NOTE — PROGRESS NOTES
Virtual Regular VisitName: Lona Cedeno      : 1945      MRN: [id number]  Encounter Provider: Vivek Preston DO  Encounter Date: 3/27/2025   Encounter department: [facility]  :  Assessment & Plan  Type 2 diabetes mellitus with diabetic polyneuropathy, without long-term current use of insulin (HCC)  Diabetes stable by last A1c at 7.4 she will continue with her same medication regimen repeat all lab work with A1c prior to the appointment in  follow diabetic diet reevaluate at that time    Lab Results   Component Value Date    HGBA1C 7.4 (H) 10/24/2024       Orders:    Comprehensive metabolic panel; Future    CBC and differential; Future    Lipid panel; Future    TSH, 3rd generation with Free T4 reflex; Future    Hemoglobin A1C; Future    Essential hypertension  Blood pressure has been stable patient continues the same medications at home checks her blood pressure and overall has been recovering from her hospitalization and multiple problems including pneumonia and respiratory failure.  Breathing has improved she is off oxygen other than at night she will start walking outdoors more with her rolling walker and follow-up with me in        Acute respiratory failure with hypoxia (HCC)  O2 saturations have been stable she uses oxygen at night at this point and will begin walking and moving more as weather improves and follow-up with me in        SIRS (systemic inflammatory response syndrome) (HCC)  Stable overall regaining strength has less pain and is using a rolling walker working on strengthening and endurance and will follow-up with me in  oxygen is used at night only now       Cardiomyopathy, unspecified type (HCC)  No worsening weight gain shortness of breath or heart issues she will stay on all medications without change low-dose aspirin along with metoprolol           History of Present Illness     Follow up evaluation post hospital in the Fall and  Winter      Review of Systems   Constitutional:  Negative for chills, fatigue and fever.   HENT:  Negative for congestion, nosebleeds, rhinorrhea, sinus pressure and sore throat.    Eyes:  Negative for discharge and redness.   Respiratory:  Positive for shortness of breath. Negative for cough.    Cardiovascular:  Negative for chest pain, palpitations and leg swelling.   Gastrointestinal:  Negative for abdominal pain, blood in stool and nausea.   Endocrine: Negative for cold intolerance, heat intolerance and polyuria.   Genitourinary:  Negative for dysuria and frequency.   Musculoskeletal:  Negative for arthralgias, back pain and myalgias.   Skin:  Negative for rash.   Neurological:  Negative for dizziness, weakness and headaches.   Hematological:  Negative for adenopathy.   Psychiatric/Behavioral:  Negative for behavioral problems and sleep disturbance. The patient is not nervous/anxious.        Objective   There were no vitals taken for this visit.    Physical Exam  Vitals and nursing note reviewed.   Constitutional:       General: She is not in acute distress.     Appearance: Normal appearance. She is well-developed.   HENT:      Head: Normocephalic and atraumatic.      Right Ear: Tympanic membrane and external ear normal.      Left Ear: Tympanic membrane and external ear normal.      Nose: Nose normal.      Mouth/Throat:      Mouth: Mucous membranes are moist.      Pharynx: Oropharynx is clear. No oropharyngeal exudate.   Eyes:      General: No scleral icterus.        Right eye: No discharge.         Left eye: No discharge.      Conjunctiva/sclera: Conjunctivae normal.      Pupils: Pupils are equal, round, and reactive to light.   Neck:      Thyroid: No thyromegaly.      Vascular: No JVD.   Cardiovascular:      Rate and Rhythm: Normal rate and regular rhythm.      Heart sounds: Normal heart sounds. No murmur heard.  Pulmonary:      Effort: Pulmonary effort is normal.      Breath sounds: No wheezing or rales.    Chest:      Chest wall: No tenderness.   Abdominal:      General: Bowel sounds are normal. There is no distension.      Palpations: Abdomen is soft. There is no mass.      Tenderness: There is no abdominal tenderness.   Musculoskeletal:         General: No tenderness or deformity. Normal range of motion.      Cervical back: Normal range of motion.   Lymphadenopathy:      Cervical: No cervical adenopathy.   Skin:     General: Skin is warm and dry.      Findings: No rash.   Neurological:      General: No focal deficit present.      Mental Status: She is alert and oriented to person, place, and time.      Cranial Nerves: No cranial nerve deficit.      Coordination: Coordination normal.      Deep Tendon Reflexes: Reflexes are normal and symmetric. Reflexes normal.   Psychiatric:         Mood and Affect: Mood normal.         Behavior: Behavior normal.         Thought Content: Thought content normal.         Judgment: Judgment normal.         Administrative Statements   Encounter provider Vivek Preston, DO    The Patient is located at Home and in the following state in which I hold an active license [place].    The patient was identified by name and date of birth. Lona Cedeno was informed that this is a telemedicine visit and that the visit is being conducted through the Epic Embedded platform. She agrees to proceed..  My office door was closed. No one else was in the room.  She acknowledged consent and understanding of privacy and security of the video platform. The patient has agreed to participate and understands they can discontinue the visit at any time.    I have spent a total time of 40 minutes in caring for this patient on the day of the visit/encounter including Diagnostic results, Prognosis, Risks and benefits of tx options, Instructions for management, Patient and family education, Importance of tx compliance, Risk factor reductions, Impressions, Counseling / Coordination of care, Documenting in the medical  record, Reviewing/placing orders in the medical record (including tests, medications, and/or procedures), Obtaining or reviewing history  , and Communicating with other healthcare professionals , not including the time spent for establishing the audio/video connection.

## 2025-03-30 DIAGNOSIS — F32.A ANXIETY AND DEPRESSION: ICD-10-CM

## 2025-03-30 DIAGNOSIS — F41.9 ANXIETY AND DEPRESSION: ICD-10-CM

## 2025-03-31 RX ORDER — BUPROPION HYDROCHLORIDE 300 MG/1
300 TABLET ORAL DAILY
Qty: 90 TABLET | Refills: 1 | Status: SHIPPED | OUTPATIENT
Start: 2025-03-31

## 2025-04-03 ENCOUNTER — OFFICE VISIT (OUTPATIENT)
Dept: OBGYN CLINIC | Facility: CLINIC | Age: 80
End: 2025-04-03
Payer: COMMERCIAL

## 2025-04-03 ENCOUNTER — APPOINTMENT (OUTPATIENT)
Dept: RADIOLOGY | Facility: CLINIC | Age: 80
End: 2025-04-03
Payer: COMMERCIAL

## 2025-04-03 VITALS — HEIGHT: 61 IN | WEIGHT: 187 LBS | BODY MASS INDEX: 35.3 KG/M2

## 2025-04-03 DIAGNOSIS — Z87.81 S/P LEFT HIP FRACTURE: Primary | ICD-10-CM

## 2025-04-03 DIAGNOSIS — Z87.81 S/P LEFT HIP FRACTURE: ICD-10-CM

## 2025-04-03 PROCEDURE — 73502 X-RAY EXAM HIP UNI 2-3 VIEWS: CPT

## 2025-04-03 PROCEDURE — 99213 OFFICE O/P EST LOW 20 MIN: CPT | Performed by: STUDENT IN AN ORGANIZED HEALTH CARE EDUCATION/TRAINING PROGRAM

## 2025-04-03 NOTE — PROGRESS NOTES
Hip Post Operative Visit     Assessment & Plan  S/p left hip fracture    Orders:    XR hip/pelv 2-3 vws left if performed; Future       Assesment:   79 y.o. female s/p IM nail left femur, DOS: 12/6/2024    Plan:  Patient is overall doing well 4 months status post above procedure.  Patient states she has no pain at this point. Repeat radiographs today reveal stable alignment of the fracture with interval healing and no hardware complications. On exam, incisions are well-healed with no signs of infection. She has great range of motion. She has continued with a home exercise program.  She may continue with activity as tolerated. She may continue to ambulate with a walker and cane as needed. Patient may follow up as needed. The patient demonstrated understanding of the discussion and was in agreement with the plan.  All of the questions were answered.  Patient can reach out to clinic with any questions or concerns at any time.    Follow up:   as needed    Patient was advised that if they have any fevers, chills, chest pain, shortness of breath, redness or drainage from the incision, please let our office know immediately.        Chief Complaint   Patient presents with    Left Hip - Post-op       History of Present Illness:  The patient is a 79 y.o. female who is being evaluated post operatively 4 months status post IM nail left femur (DOS 12/6/2024).    Patient states that overall she is doing well. She denies any pain in the left hip.  She has been staying at home with no issues. She has continued with her home exercise program where she has been doing resistance band stretches. She has been using a walker whenever leaving the house, and a cane while at home. She has no new complaints. She feels content with her progress.  The patient denies any fevers, chills, calf pain, chest pain/shortness of breath, redness or drainage from the incision.     I have reviewed the past medical, surgical, social and family history,  "medications and allergies as documented in the EMR.      Review of systems: ROS is negative other than that noted in the HPI.  Constitutional: Negative for fatigue and fever.        Physical Exam:    Height 5' 1\" (1.549 m), weight 84.8 kg (187 lb).    General/Constitutional: NAD, well developed, well nourished  HENT: Normocephalic, atraumatic  CV: Intact distal pulses, regular rate  Resp: No respiratory distress or labored breathing  Lymphatic: No lymphadenopathy palpated  Neuro: Alert and Oriented x 3, no focal deficits  Psych: Normal mood, normal affect, normal judgement, normal behavior  Skin: Warm, dry, no rashes, no erythema      left Hip Exam (focused):  Incisions well-healed with no erythema, no drainage, no dehiscence.   Hip ROM: pain free circumduction of the hip  Swelling: negative  Tenderness to palpation: none     LE NV Exam:   +2 DP/PT pulses   SILT in the SPN, DPN, sural, saphenous, tibial nerve distributions  SILT L2-S1  Motor intact in the SPN, DPN, tibial nerve distributions    No calf tenderness to palpation bilaterally      Imaging:  X-rays of the left hip were obtained on 4/3/2025 and reviewed with the patient.  Per my independent review, the imaging shows anatomic alignment of the fracture with hardware in appropriate position and continued interval healing. There is no evidence of hardware complication.        Scribe Attestation      I,:  Frederick Castellon PA-C am acting as a scribe while in the presence of the attending physician.:       I,:  Rigo Prieto MD personally performed the services described in this documentation    as scribed in my presence.:            "

## 2025-04-06 DIAGNOSIS — E11.42 TYPE 2 DIABETES MELLITUS WITH DIABETIC POLYNEUROPATHY, WITHOUT LONG-TERM CURRENT USE OF INSULIN (HCC): Chronic | ICD-10-CM

## 2025-04-07 RX ORDER — PREGABALIN 200 MG/1
200 CAPSULE ORAL 3 TIMES DAILY
Qty: 90 CAPSULE | Refills: 0 | Status: SHIPPED | OUTPATIENT
Start: 2025-04-07

## 2025-04-09 ENCOUNTER — OFFICE VISIT (OUTPATIENT)
Dept: CARDIOLOGY CLINIC | Facility: CLINIC | Age: 80
End: 2025-04-09
Payer: COMMERCIAL

## 2025-04-09 VITALS
HEIGHT: 61 IN | WEIGHT: 166.4 LBS | BODY MASS INDEX: 31.42 KG/M2 | DIASTOLIC BLOOD PRESSURE: 70 MMHG | HEART RATE: 80 BPM | SYSTOLIC BLOOD PRESSURE: 116 MMHG

## 2025-04-09 DIAGNOSIS — I73.9 PAD (PERIPHERAL ARTERY DISEASE) (HCC): Chronic | ICD-10-CM

## 2025-04-09 DIAGNOSIS — F17.200 TOBACCO USE DISORDER: Chronic | ICD-10-CM

## 2025-04-09 DIAGNOSIS — I42.0 DILATED CARDIOMYOPATHY (HCC): Primary | ICD-10-CM

## 2025-04-09 PROCEDURE — 99214 OFFICE O/P EST MOD 30 MIN: CPT | Performed by: INTERNAL MEDICINE

## 2025-04-09 NOTE — ASSESSMENT & PLAN NOTE
No current symptoms.  Will recheck by echo to see if the decrease in EF was stress related.  She is not interested in looking for CAD and further interventions unless symptomatic.

## 2025-04-09 NOTE — PROGRESS NOTES
Patient ID: Lona Cedeno is a 79 y.o. female.        Plan:      Assessment & Plan  Dilated cardiomyopathy (HCC)  No current symptoms.  Will recheck by echo to see if the decrease in EF was stress related.  She is not interested in looking for CAD and further interventions unless symptomatic.  Tobacco use disorder  Down from 2 ppd to 1/2 ppd.  She will try to cut back further.  PAD (peripheral artery disease) (HCC)  Follows with my vascular colleagues.  S/p ax-fem bypass on the right.      Follow up Plan/Other summary comments:  Return in about 6 months (around 10/9/2025).    HPI: Patient is seen in follow-up today regarding the above.  Recent hospital stay with diminished ejection fraction was reviewed.  Since that hospital stay she was at rehab and she is now back home.  She is feeling better.       No results found for this visit on 25.      Most recent or relevant cardiac/vascular testing:    TTE 2023: Normal LV systolic function.  TTE 2025: LVEF 35%.    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;  Surgeon: Cristian Child MD;  Location: [facility];  Service: Urology    EVACUATION OF HEMATOMA Right 2024    Procedure: EVACUATION/ DRAINAGE CHEST WALL  HEMATOMA;  Surgeon: Seth Hoyos MD;  Location: [facility];  Service: Vascular    FL RETROGRADE PYELOGRAM  2023    HERNIA REPAIR      umbilicus w/ mesh    ND BYPASS W/VEIN AXILLARY-FEMORAL Right 2024    Procedure: BYPASS AXILLO-FEMORAL, RIGHT WITH 8MM  "RINGED PTFE GRAFT;  Surgeon: Seth Hoyos MD;  [facility];  Service: Vascular    CA OPTX FEM SHFT FX W/INSJ IMED IMPLT W/WO SCREW Left 12/6/2024    Procedure: INSERTION NAIL IM FEMUR ANTEGRADE (TROCHANTERIC);  Surgeon: Rigo Prieto MD;  Location: [facility];  Service: Orthopedics    SPLIT THICKNESS SKIN GRAFT Right 2/28/2024    Procedure: SKIN GRAFT SPLIT THICKNESS (STSG)  EXTREMITY, Wound vac dressing change;  Surgeon: Rigo Yeboah DPM;  [facility];  Service: Podiatry    WOUND DEBRIDEMENT Right 2/4/2024    Procedure: RIGHT WOUND AND ACHILLES DEBRIDEMENT FOOT/TOE (WASH OUT); PARTIAL AMPUTATION DISTAL 2ND TOE;  Surgeon: Aaron Montero DPM;  [facility];  Service: Podiatry    WOUND DEBRIDEMENT Right 2/14/2024    Procedure: DEBRIDEMENT RIGHT GROIN  WOUND AND WASHOUT, APPLICATION OF WOUND VAC;  Surgeon: Suly Muro DO;  [facility];  Service: Vascular    WOUND DEBRIDEMENT Right 2/19/2024    Procedure: DEBRIDEMENT LOWER EXTREMITY, washout;  Surgeon: Suly Muro DO;  [facility];  Service: Vascular       Lipid Profile: Reviewed      Review of Systems   10  point ROS  was otherwise non pertinent or negative except as per HPI or as below.   Gait: Normal.        Objective:     /70   Pulse 80   Ht 5' 1\" (1.549 m)   Wt 75.5 kg (166 lb 6.4 oz)   BMI 31.44 kg/m²     PHYSICAL EXAM:    General:  Normal appearance in no distress.  Eyes:  Anicteric.  Oral mucosa:  Moist.  Neck:  No JVD. Carotid upstrokes are brisk without bruits.  No masses.  Chest:  Clear to auscultation.  Cardiac:  No palpable PMI.  Normal S1 and S2.  No murmur gallop or rub.  Abdomen:  Soft and nontender. No palpable organomegaly or aortic enlargement.  Extremities:  No peripheral edema.  Musculoskeletal:  Symmetric.   Vascular:  Popliteal and pedal pulses are absent.  The feet are warm.  Neuro:  Grossly symmetric.  Psych:  Alert and oriented x3.      Meds reviewed.    Past " Medical History:   Diagnosis Date    Anxiety     Closed fracture of coccyx (HCC) 05/24/2023    COPD (chronic obstructive pulmonary disease) (HCC)     Diabetes mellitus (HCC)     GERD (gastroesophageal reflux disease)     Hyperlipidemia     Hypertension     IBS (irritable bowel syndrome)     Peripheral arterial disease (HCC)     Shoulder fracture     Tobacco use            Social History     Tobacco Use   Smoking Status Every Day    Current packs/day: 1.50    Average packs/day: 1 pack/day for 64.3 years (64.7 ttl pk-yrs)    Types: Cigarettes    Start date: 1961   Smokeless Tobacco Never   Tobacco Comments    Quit January 31, 2024

## 2025-04-13 DIAGNOSIS — E11.42 TYPE 2 DIABETES MELLITUS WITH DIABETIC POLYNEUROPATHY, WITHOUT LONG-TERM CURRENT USE OF INSULIN (HCC): Chronic | ICD-10-CM

## 2025-04-20 DIAGNOSIS — I10 ESSENTIAL HYPERTENSION: Chronic | ICD-10-CM

## 2025-04-20 DIAGNOSIS — R09.02 HYPOXIA: ICD-10-CM

## 2025-04-20 RX ORDER — ATORVASTATIN CALCIUM 10 MG/1
10 TABLET, FILM COATED ORAL
Qty: 90 TABLET | Refills: 1 | Status: SHIPPED | OUTPATIENT
Start: 2025-04-20

## 2025-04-20 RX ORDER — AMILORIDE HYDROCHLORIDE 5 MG/1
5 TABLET ORAL DAILY
Qty: 90 TABLET | Refills: 1 | Status: SHIPPED | OUTPATIENT
Start: 2025-04-20

## 2025-04-26 DIAGNOSIS — E11.42 TYPE 2 DIABETES MELLITUS WITH DIABETIC POLYNEUROPATHY, WITHOUT LONG-TERM CURRENT USE OF INSULIN (HCC): Chronic | ICD-10-CM

## 2025-04-28 RX ORDER — LANCETS 33 GAUGE
EACH MISCELLANEOUS
Qty: 200 EACH | Refills: 1 | Status: SHIPPED | OUTPATIENT
Start: 2025-04-28

## 2025-04-28 RX ORDER — BLOOD SUGAR DIAGNOSTIC
STRIP MISCELLANEOUS
Qty: 200 STRIP | Refills: 1 | Status: SHIPPED | OUTPATIENT
Start: 2025-04-28

## 2025-05-18 DIAGNOSIS — E11.42 TYPE 2 DIABETES MELLITUS WITH DIABETIC POLYNEUROPATHY, WITHOUT LONG-TERM CURRENT USE OF INSULIN (HCC): Chronic | ICD-10-CM

## 2025-05-18 DIAGNOSIS — K21.9 GASTROESOPHAGEAL REFLUX DISEASE WITHOUT ESOPHAGITIS: ICD-10-CM

## 2025-05-19 RX ORDER — PANTOPRAZOLE SODIUM 40 MG/1
40 TABLET, DELAYED RELEASE ORAL DAILY
Qty: 90 TABLET | Refills: 1 | Status: SHIPPED | OUTPATIENT
Start: 2025-05-19

## 2025-05-21 RX ORDER — PREGABALIN 200 MG/1
200 CAPSULE ORAL 3 TIMES DAILY
Qty: 90 CAPSULE | Refills: 0 | Status: SHIPPED | OUTPATIENT
Start: 2025-05-21

## 2025-06-08 DIAGNOSIS — I10 ESSENTIAL HYPERTENSION: Chronic | ICD-10-CM

## 2025-06-08 RX ORDER — LISINOPRIL 5 MG/1
5 TABLET ORAL DAILY
Qty: 90 TABLET | Refills: 1 | Status: SHIPPED | OUTPATIENT
Start: 2025-06-08

## 2025-06-16 ENCOUNTER — OFFICE VISIT (OUTPATIENT)
Dept: VASCULAR SURGERY | Facility: CLINIC | Age: 80
End: 2025-06-16
Payer: COMMERCIAL

## 2025-06-16 VITALS
HEIGHT: 61 IN | SYSTOLIC BLOOD PRESSURE: 122 MMHG | BODY MASS INDEX: 31.53 KG/M2 | WEIGHT: 167 LBS | DIASTOLIC BLOOD PRESSURE: 76 MMHG

## 2025-06-16 DIAGNOSIS — E11.42 TYPE 2 DIABETES MELLITUS WITH DIABETIC POLYNEUROPATHY, WITH LONG-TERM CURRENT USE OF INSULIN (HCC): ICD-10-CM

## 2025-06-16 DIAGNOSIS — I73.9 PAD (PERIPHERAL ARTERY DISEASE) (HCC): Primary | Chronic | ICD-10-CM

## 2025-06-16 DIAGNOSIS — Z79.4 TYPE 2 DIABETES MELLITUS WITH DIABETIC POLYNEUROPATHY, WITH LONG-TERM CURRENT USE OF INSULIN (HCC): ICD-10-CM

## 2025-06-16 PROCEDURE — 99214 OFFICE O/P EST MOD 30 MIN: CPT | Performed by: SURGERY

## 2025-06-16 NOTE — LETTER
2025     Vivek Preston DO  [address] [place], [place] [id number]    Patient: Lona Cedeno   YOB: 1945   Date of Visit: 2025       Dear Dr. Vivek Preston DO  Lona Cedeno:    Thank you for referring Lona Cedeno to me for evaluation. Below are my notes for this consultation.    If you have questions, please do not hesitate to call me. I look forward to following your patient along with you.         Sincerely,        Seth Hoyos MD        CC: Ratnabetty Hoyos MD  2025  2:15 PM  Sign when Signing Visit  Name: Lona Cedeno      : 1945      MRN: [id number]  Encounter Provider: Seth Hoyos MD  Encounter Date: 2025   Enc[facility] department[facility] [place]  :  Assessment & Plan  PAD (peripheral artery disease) ([facility])  79-year-old female with right lower extremity peripheral arterial occlusive disease status post right axillofemoral bypass with PTFE.  The patient does not demonstrate any signs of infection.  There is some fluid noted on her graft which is likely sterile seroma.  She also has a palpable fluid pocket deep on her right side.  We have elected against draining at this time for fear of introduction of bacteria.  She remains asymptomatic with no erythema or pain in the area.  I recommend smoking cessation as able as well as continued surveillance of injury.  6-month arterial duplex ordered.  She is continuing with aspirin and statin medication as her maintenance therapy.    Orders:  •  VAS ARTERIAL DUPLEX- LOWER LIMB BILATERAL; Future    Type 2 diabetes mellitus with diabetic polyneuropathy, with long-term current use of insulin ([facility])    Lab Results   Component Value Date    HGBA1C 7.4 (H) 10/24/2024                History of Present Illness  HPI  Lona Cedeno is a 79 y.o. female who presents now approximately 16 months out from right axillofemoral bypass for right lower  "extremity tissue loss.  Her postoperative course was complicated by wound is not in the right groin.  She has since had complete healing at that area.  She does have a palpable fluid collection in the right flank however this is likely sterile seroma as it has not had any associated pain or erythema.  The patient denies fevers or chills.  She recently had labs and her white blood cell count has been normal serially.  She is compliant with aspirin and statin therapy.  Unfortunately she continues to smoke however she has cut back significantly.  We discussed options for complete smoking cessation, she has nicotine patches at home for assistance.    Patient presents to review NORMA, denies claudication symptoms, tissue loss, or rest pain. Patient is a current smoker.       Review of Systems   Constitutional: Negative.    HENT: Negative.     Eyes: Negative.    Respiratory: Negative.     Cardiovascular: Negative.    Gastrointestinal: Negative.    Endocrine: Negative.    Genitourinary: Negative.    Musculoskeletal: Negative.    Skin: Negative.    Allergic/Immunologic: Negative.    Neurological: Negative.    Hematological: Negative.    Psychiatric/Behavioral: Negative.            Objective  /76 (BP Location: Right arm, Patient Position: Sitting, Cuff Size: Standard)   Ht 5' 1\" (1.549 m)   Wt 75.8 kg (167 lb)   BMI 31.55 kg/m²      Physical Exam  Vitals and nursing note reviewed.   Constitutional:       General: She is not in acute distress.     Appearance: She is well-developed.   HENT:      Head: Normocephalic and atraumatic.     Eyes:      Conjunctiva/sclera: Conjunctivae normal.       Cardiovascular:      Rate and Rhythm: Normal rate and regular rhythm.      Heart sounds: No murmur heard.     Comments: No tissue loss  Pulmonary:      Effort: Pulmonary effort is normal. No respiratory distress.      Breath sounds: Normal breath sounds.   Abdominal:      Palpations: Abdomen is soft.      Tenderness: There is no " abdominal tenderness.     Musculoskeletal:         General: No swelling.      Cervical back: Neck supple.     Skin:     General: Skin is warm and dry.      Capillary Refill: Capillary refill takes less than 2 seconds.     Neurological:      Mental Status: She is alert.     Psychiatric:         Mood and Affect: Mood normal.

## 2025-06-16 NOTE — PATIENT INSTRUCTIONS
1. PAD (peripheral artery disease) ([facility])  Assessment & Plan:  79-year-old female with right lower extremity peripheral arterial occlusive disease status post right axillofemoral bypass with PTFE.  The patient does not demonstrate any signs of infection.  There is some fluid noted on her graft which is likely sterile seroma.  She also has a palpable fluid pocket deep on her right side.  We have elected against draining at this time for fear of introduction of bacteria.  She remains asymptomatic with no erythema or pain in the area.  I recommend smoking cessation as able as well as continued surveillance of injury.  6-month arterial duplex ordered.  She is continuing with aspirin and statin medication as her maintenance therapy.    Orders:    VAS ARTERIAL DUPLEX- LOWER LIMB BILATERAL; Future    Orders:  -     VAS ARTERIAL DUPLEX- LOWER LIMB BILATERAL; Future; Expected date: 12/16/2025  2. Type 2 diabetes mellitus with diabetic polyneuropathy, with long-term current use of insulin ([facility])

## 2025-06-16 NOTE — ASSESSMENT & PLAN NOTE
79-year-old female with right lower extremity peripheral arterial occlusive disease status post right axillofemoral bypass with PTFE.  The patient does not demonstrate any signs of infection.  There is some fluid noted on her graft which is likely sterile seroma.  She also has a palpable fluid pocket deep on her right side.  We have elected against draining at this time for fear of introduction of bacteria.  She remains asymptomatic with no erythema or pain in the area.  I recommend smoking cessation as able as well as continued surveillance of injury.  6-month arterial duplex ordered.  She is continuing with aspirin and statin medication as her maintenance therapy.    Orders:    VAS ARTERIAL DUPLEX- LOWER LIMB BILATERAL; Future

## 2025-06-16 NOTE — PROGRESS NOTES
Name: Lona Cedeno      : 1945      MRN: [id number]  Encounter Provider: Seth Hoyos MD  Encounter Date: 2025   Encounter department: [facility] [place]  :  Assessment & Plan  PAD (peripheral artery disease) ([facility])  79-year-old female with right lower extremity peripheral arterial occlusive disease status post right axillofemoral bypass with PTFE.  The patient does not demonstrate any signs of infection.  There is some fluid noted on her graft which is likely sterile seroma.  She also has a palpable fluid pocket deep on her right side.  We have elected against draining at this time for fear of introduction of bacteria.  She remains asymptomatic with no erythema or pain in the area.  I recommend smoking cessation as able as well as continued surveillance of injury.  6-month arterial duplex ordered.  She is continuing with aspirin and statin medication as her maintenance therapy.    Orders:    VAS ARTERIAL DUPLEX- LOWER LIMB BILATERAL; Future    Type 2 diabetes mellitus with diabetic polyneuropathy, with long-term current use of insulin ([facility])    Lab Results   Component Value Date    HGBA1C 7.4 (H) 10/24/2024                History of Present Illness   HPI  Lona Cedeno is a 79 y.o. female who presents now approximately 16 months out from right axillofemoral bypass for right lower extremity tissue loss.  Her postoperative course was complicated by wound is not in the right groin.  She has since had complete healing at that area.  She does have a palpable fluid collection in the right flank however this is likely sterile seroma as it has not had any associated pain or erythema.  The patient denies fevers or chills.  She recently had labs and her white blood cell count has been normal serially.  She is compliant with aspirin and statin therapy.  Unfortunately she continues to smoke however she has cut back significantly.  We discussed options for complete smoking  "cessation, she has nicotine patches at home for assistance.    Patient presents to review NORMA, denies claudication symptoms, tissue loss, or rest pain. Patient is a current smoker.       Review of Systems   Constitutional: Negative.    HENT: Negative.     Eyes: Negative.    Respiratory: Negative.     Cardiovascular: Negative.    Gastrointestinal: Negative.    Endocrine: Negative.    Genitourinary: Negative.    Musculoskeletal: Negative.    Skin: Negative.    Allergic/Immunologic: Negative.    Neurological: Negative.    Hematological: Negative.    Psychiatric/Behavioral: Negative.            Objective   /76 (BP Location: Right arm, Patient Position: Sitting, Cuff Size: Standard)   Ht 5' 1\" (1.549 m)   Wt 75.8 kg (167 lb)   BMI 31.55 kg/m²      Physical Exam  Vitals and nursing note reviewed.   Constitutional:       General: She is not in acute distress.     Appearance: She is well-developed.   HENT:      Head: Normocephalic and atraumatic.     Eyes:      Conjunctiva/sclera: Conjunctivae normal.       Cardiovascular:      Rate and Rhythm: Normal rate and regular rhythm.      Heart sounds: No murmur heard.     Comments: No tissue loss  Pulmonary:      Effort: Pulmonary effort is normal. No respiratory distress.      Breath sounds: Normal breath sounds.   Abdominal:      Palpations: Abdomen is soft.      Tenderness: There is no abdominal tenderness.     Musculoskeletal:         General: No swelling.      Cervical back: Neck supple.     Skin:     General: Skin is warm and dry.      Capillary Refill: Capillary refill takes less than 2 seconds.     Neurological:      Mental Status: She is alert.     Psychiatric:         Mood and Affect: Mood normal.           "

## 2025-06-19 ENCOUNTER — RA CDI HCC (OUTPATIENT)
Dept: OTHER | Facility: HOSPITAL | Age: 80
End: 2025-06-19

## 2025-06-19 PROBLEM — R65.10 SIRS (SYSTEMIC INFLAMMATORY RESPONSE SYNDROME) (HCC): Status: RESOLVED | Noted: 2024-12-04 | Resolved: 2025-06-19

## 2025-06-20 NOTE — PROGRESS NOTES
HCC coding opportunities    E11.51, E11.65  GR     Chart Reviewed number of suggestions sent to Provider: 2     Patients Insurance     Medicare Insurance: [employer]

## 2025-06-23 DIAGNOSIS — E11.42 TYPE 2 DIABETES MELLITUS WITH DIABETIC POLYNEUROPATHY, WITHOUT LONG-TERM CURRENT USE OF INSULIN (HCC): Chronic | ICD-10-CM

## 2025-06-24 ENCOUNTER — TELEPHONE (OUTPATIENT)
Age: 80
End: 2025-06-24

## 2025-06-24 RX ORDER — PREGABALIN 200 MG/1
200 CAPSULE ORAL 3 TIMES DAILY
Qty: 90 CAPSULE | Refills: 0 | Status: SHIPPED | OUTPATIENT
Start: 2025-06-24

## 2025-06-24 NOTE — TELEPHONE ENCOUNTER
Patient called stating she went to have labs drawn prior to her appointment scheduled for Wednesday, 25 and was told they had  on 25.  Patient does have active orders in her chart from 3/27/25 and will discuss further with provider tomorrow at appointment.

## 2025-06-25 ENCOUNTER — OFFICE VISIT (OUTPATIENT)
Dept: FAMILY MEDICINE CLINIC | Facility: CLINIC | Age: 80
End: 2025-06-25
Payer: COMMERCIAL

## 2025-06-25 VITALS
DIASTOLIC BLOOD PRESSURE: 72 MMHG | OXYGEN SATURATION: 92 % | HEART RATE: 84 BPM | BODY MASS INDEX: 32.21 KG/M2 | TEMPERATURE: 98.9 F | RESPIRATION RATE: 22 BRPM | HEIGHT: 61 IN | SYSTOLIC BLOOD PRESSURE: 130 MMHG | WEIGHT: 170.6 LBS

## 2025-06-25 DIAGNOSIS — Z00.00 MEDICARE ANNUAL WELLNESS VISIT, SUBSEQUENT: ICD-10-CM

## 2025-06-25 DIAGNOSIS — I10 ESSENTIAL HYPERTENSION: Chronic | ICD-10-CM

## 2025-06-25 DIAGNOSIS — M81.0 AGE-RELATED OSTEOPOROSIS WITHOUT CURRENT PATHOLOGICAL FRACTURE: ICD-10-CM

## 2025-06-25 DIAGNOSIS — E11.42 TYPE 2 DIABETES MELLITUS WITH DIABETIC POLYNEUROPATHY, WITHOUT LONG-TERM CURRENT USE OF INSULIN (HCC): Primary | ICD-10-CM

## 2025-06-25 DIAGNOSIS — I73.9 PAD (PERIPHERAL ARTERY DISEASE) (HCC): Chronic | ICD-10-CM

## 2025-06-25 DIAGNOSIS — I42.9 CARDIOMYOPATHY, UNSPECIFIED TYPE (HCC): ICD-10-CM

## 2025-06-25 LAB — SL AMB POCT HEMOGLOBIN AIC: 7.5 (ref ?–6.5)

## 2025-06-25 PROCEDURE — G2211 COMPLEX E/M VISIT ADD ON: HCPCS | Performed by: FAMILY MEDICINE

## 2025-06-25 PROCEDURE — G0439 PPPS, SUBSEQ VISIT: HCPCS | Performed by: FAMILY MEDICINE

## 2025-06-25 PROCEDURE — 83036 HEMOGLOBIN GLYCOSYLATED A1C: CPT | Performed by: FAMILY MEDICINE

## 2025-06-25 PROCEDURE — 99214 OFFICE O/P EST MOD 30 MIN: CPT | Performed by: FAMILY MEDICINE

## 2025-06-25 NOTE — ASSESSMENT & PLAN NOTE
Hypertension stable continue same medication regimen of metoprolol along with lisinopril patient doing well overall now and will continue follow-up with me at next visit here in 6 months

## 2025-06-25 NOTE — ASSESSMENT & PLAN NOTE
Diabetes with A1c at 7.5 stable continuing on same medication regimen metformin and Januvia reevaluate follow-up laboratory work at next visit follow diabetic diet plan  Lab Results   Component Value Date    HGBA1C 7.5 (A) 06/25/2025       Orders:    POCT hemoglobin A1c    Albumin / creatinine urine ratio; Standing    Comprehensive metabolic panel; Standing    Hemoglobin A1C; Standing    TSH, 3rd generation with Free T4 reflex; Standing    Lipid Panel with Direct LDL reflex; Standing

## 2025-06-25 NOTE — PROGRESS NOTES
Name: Lona Cedeno      : 1945      MRN: [id number]  Encounter Provider: Vivek Preston DO  Encounter Date: 2025   Encounter department: [facility]  :    Falls Plan of Care: Balance, strength, and gait training instructions were provided.  Assessment & Plan  Type 2 diabetes mellitus with diabetic polyneuropathy, without long-term current use of insulin (HCC)  Diabetes with A1c at 7.5 stable continuing on same medication regimen metformin and Januvia reevaluate follow-up laboratory work at next visit follow diabetic diet plan  Lab Results   Component Value Date    HGBA1C 7.5 (A) 2025       Orders:    POCT hemoglobin A1c    Albumin / creatinine urine ratio; Standing    Comprehensive metabolic panel; Standing    Hemoglobin A1C; Standing    TSH, 3rd generation with Free T4 reflex; Standing    Lipid Panel with Direct LDL reflex; Standing    Essential hypertension  Hypertension stable continue same medication regimen of metoprolol along with lisinopril patient doing well overall now and will continue follow-up with me at next visit here in 6 months         Cardiomyopathy, unspecified type (HCC)  Stable overall cardiomyopathy has been without new issue and she will maintain same medication regimen followed up by cardiology continue with low-dose aspirin         PAD (peripheral artery disease) (HCC)  Peripheral arterial disease stable doing well followed by vascular surgery post bypass to right lower extremity doing well.  Continue all medications and follow-up at next visit         Age-related osteoporosis without current pathological fracture  Patient is followed by orthopedics as well she will continue with calcium vitamin D and weightbearing exercise check DEXA scan every 2 years         Medicare annual wellness visit, subsequent            Preventive health issues were discussed with patient, and age appropriate screening tests were ordered as noted in patient's After  Visit Summary. Personalized health advice and appropriate referrals for health education or preventive services given if needed, as noted in patient's After Visit Summary.    History of Present Illness     Follow-up evaluation Medicare well visit discussed hospitalizations recently       Patient Care Team:  Vivek Preston,  as PCP - General (Family Medicine)  Vivek Preston, DO as PCP - PCP-[facility] ([facility])  Vivek Preston, DO as PCP - PCP-[facility] ([facility])  Kassandra Hopkins PA-C as Physician Assistant (Physician Assistant)  ROXANE De Leon as Nurse Practitioner (Pulmonology)  Jennifer Gann DPM as Wound Care (Podiatry)    Review of Systems   Constitutional:  Negative for chills, fatigue and fever.   HENT:  Negative for congestion, nosebleeds, rhinorrhea, sinus pressure and sore throat.    Eyes:  Negative for discharge and redness.   Respiratory:  Negative for cough and shortness of breath.    Cardiovascular:  Negative for chest pain, palpitations and leg swelling.   Gastrointestinal:  Negative for abdominal pain, blood in stool and nausea.   Endocrine: Negative for cold intolerance, heat intolerance and polyuria.   Genitourinary:  Negative for dysuria and frequency.   Musculoskeletal:  Positive for arthralgias. Negative for back pain and myalgias.   Skin:  Negative for rash.   Neurological:  Negative for dizziness, weakness and headaches.   Hematological:  Negative for adenopathy.   Psychiatric/Behavioral:  Negative for behavioral problems and sleep disturbance. The patient is not nervous/anxious.      Medical History Reviewed by provider this encounter:  Problems       Annual Wellness Visit Questionnaire   Lona Hastings is here for her Subsequent Wellness visit. Last Medicare Wellness visit information reviewed, patient interviewed, no change since last AWV.     Depression Screening:   PHQ-9 Score: 2      Fall Risk Screening:   In the past year, patient has experienced: history of falling in  past year    Number of falls: 1  Injured during fall?: Yes      Urinary Incontinence Screening:   Patient has not leaked urine accidently in the last six months.     Home Safety:  Patient does not have trouble with stairs inside or outside of their home. Patient has working smoke alarms and has working carbon monoxide detector. Home safety hazards include: none.     Nutrition:   Current diet is Diabetic.     Medications:   Patient is currently taking over-the-counter supplements. OTC medications include: see medication list. Patient is able to manage medications.     Activities of Daily Living (ADLs)/Instrumental Activities of Daily Living (IADLs):   Walk and transfer into and out of bed and chair?: Yes  Dress and groom yourself?: Yes    Bathe or shower yourself?: Yes    Feed yourself? Yes  Do your laundry/housekeeping?: Yes  Manage your money, pay your bills and track your expenses?: No  Make your own meals?: No    Do your own shopping?: Yes    ADL comments: Son in law assist, and daughter     Previous Hospitalizations:   Any hospitalizations or ED visits within the last 12 months?: Yes    How many hospitalizations have you had in the last year?: 1-2    Preventive Screenings      Cardiovascular Screening:    General: Screening Not Indicated and History Lipid Disorder      Diabetes Screening:     General: Screening Not Indicated and History Diabetes      Colorectal Cancer Screening:     General: Screening Current      Breast Cancer Screening:     General: Screening Current      Cervical Cancer Screening:    General: Screening Not Indicated      Osteoporosis Screening:    General: Screening Not Indicated and History Osteoporosis      Lung Cancer Screening:     General: Screening Not Indicated      Hepatitis C Screening:    General: Screening Current    Immunizations:  - Immunizations due: Zoster (Shingrix)    Screening, Brief Intervention, and Referral to Treatment (SBIRT)     Screening  Typical number of drinks in a  "day: 0  Typical number of drinks in a week: 1  Interpretation: Low risk drinking behavior.    Single Item Drug Screening:  How often have you used an illegal drug (including marijuana) or a prescription medication for non-medical reasons in the past year? never    Single Item Drug Screen Score: 0  Interpretation: Negative screen for possible drug use disorder    Social Drivers of Health     Financial Resource Strain: Low Risk  (2/9/2023)    Overall Financial Resource Strain (CARDIA)     Difficulty of Paying Living Expenses: Not hard at all   Food Insecurity: No Food Insecurity (6/25/2025)    Nursing - Inadequate Food Risk Classification     Worried About Running Out of Food in the Last Year: Never true     Ran Out of Food in the Last Year: Never true     Ran Out of Food in the Last Year: Never true   Transportation Needs: No Transportation Needs (6/25/2025)    PRAPARE - Transportation     Lack of Transportation (Medical): No     Lack of Transportation (Non-Medical): No   Housing Stability: Low Risk  (6/25/2025)    Housing Stability Vital Sign     Unable to Pay for Housing in the Last Year: No     Number of Times Moved in the Last Year: 0     Homeless in the Last Year: No   Utilities: Not At Risk (6/25/2025)    [facility] Utilities     Threatened with loss of utilities: No     No results found.    Objective   /72 (BP Location: Left arm, Patient Position: Sitting, Cuff Size: Standard)   Pulse 84   Temp 98.9 °F (37.2 °C) (Tympanic)   Resp 22   Ht 5' 1\" (1.549 m)   Wt 77.4 kg (170 lb 9.6 oz)   SpO2 92%   BMI 32.23 kg/m²     Physical Exam  Vitals and nursing note reviewed.   Constitutional:       General: She is not in acute distress.     Appearance: Normal appearance. She is well-developed.   HENT:      Head: Normocephalic and atraumatic.      Right Ear: Tympanic membrane and external ear normal.      Left Ear: Tympanic membrane and external ear normal.      Nose: Nose normal.      Mouth/Throat:      Mouth: Mucous " membranes are moist.      Pharynx: Oropharynx is clear. No oropharyngeal exudate.     Eyes:      General: No scleral icterus.        Right eye: No discharge.         Left eye: No discharge.      Conjunctiva/sclera: Conjunctivae normal.      Pupils: Pupils are equal, round, and reactive to light.     Neck:      Thyroid: No thyromegaly.      Vascular: No JVD.     Cardiovascular:      Rate and Rhythm: Normal rate and regular rhythm.      Heart sounds: Normal heart sounds. No murmur heard.  Pulmonary:      Effort: Pulmonary effort is normal.      Breath sounds: No wheezing or rales.   Chest:      Chest wall: No tenderness.   Abdominal:      General: Bowel sounds are normal. There is no distension.      Palpations: Abdomen is soft. There is no mass.      Tenderness: There is no abdominal tenderness.     Musculoskeletal:         General: No tenderness or deformity. Normal range of motion.      Cervical back: Normal range of motion.   Lymphadenopathy:      Cervical: No cervical adenopathy.     Skin:     General: Skin is warm and dry.      Findings: No rash.     Neurological:      General: No focal deficit present.      Mental Status: She is alert and oriented to person, place, and time.      Cranial Nerves: No cranial nerve deficit.      Coordination: Coordination normal.      Deep Tendon Reflexes: Reflexes are normal and symmetric. Reflexes normal.     Psychiatric:         Mood and Affect: Mood normal.         Behavior: Behavior normal.         Thought Content: Thought content normal.         Judgment: Judgment normal.

## 2025-06-25 NOTE — ASSESSMENT & PLAN NOTE
Stable overall cardiomyopathy has been without new issue and she will maintain same medication regimen followed up by cardiology continue with low-dose aspirin

## 2025-06-25 NOTE — ASSESSMENT & PLAN NOTE
Patient is followed by orthopedics as well she will continue with calcium vitamin D and weightbearing exercise check DEXA scan every 2 years

## 2025-06-25 NOTE — ASSESSMENT & PLAN NOTE
Peripheral arterial disease stable doing well followed by vascular surgery post bypass to right lower extremity doing well.  Continue all medications and follow-up at next visit

## 2025-06-29 DIAGNOSIS — J44.9 COPD (CHRONIC OBSTRUCTIVE PULMONARY DISEASE) (HCC): ICD-10-CM

## 2025-07-01 RX ORDER — ALBUTEROL SULFATE 90 UG/1
2 INHALANT RESPIRATORY (INHALATION) EVERY 6 HOURS PRN
Qty: 18 G | Refills: 5 | Status: SHIPPED | OUTPATIENT
Start: 2025-07-01

## 2025-07-01 RX ORDER — FLUTICASONE PROPIONATE AND SALMETEROL 250; 50 UG/1; UG/1
1 POWDER RESPIRATORY (INHALATION) 2 TIMES DAILY
Qty: 60 BLISTER | Refills: 55 | Status: SHIPPED | OUTPATIENT
Start: 2025-07-01

## 2025-07-08 ENCOUNTER — TELEPHONE (OUTPATIENT)
Age: 80
End: 2025-07-08

## 2025-07-09 DIAGNOSIS — J44.9 COPD, SEVERITY TO BE DETERMINED (HCC): Primary | Chronic | ICD-10-CM

## 2025-07-09 DIAGNOSIS — T14.8XXA WOUND OF SKIN: ICD-10-CM

## 2025-07-09 RX ORDER — MUPIROCIN 2 %
OINTMENT (GRAM) TOPICAL
Qty: 22 G | Refills: 1 | Status: SHIPPED | OUTPATIENT
Start: 2025-07-09

## 2025-07-09 NOTE — TELEPHONE ENCOUNTER
The last time this RX was sent in was 6/25/24 for   Mupirocin (bactroban) 2% Ointment  Direction were : Apply topically daily for 7 days to open wound of RLE.    Pt stated it for the same wound as before the ointment helped her and she used her last one.     Please advise, TY

## 2025-07-14 DIAGNOSIS — E11.42 TYPE 2 DIABETES MELLITUS WITH DIABETIC POLYNEUROPATHY, WITHOUT LONG-TERM CURRENT USE OF INSULIN (HCC): Chronic | ICD-10-CM

## 2025-07-16 RX ORDER — PREGABALIN 200 MG/1
200 CAPSULE ORAL 3 TIMES DAILY
Qty: 90 CAPSULE | Refills: 0 | Status: SHIPPED | OUTPATIENT
Start: 2025-07-16

## 2025-07-25 PROBLEM — Z00.00 MEDICARE ANNUAL WELLNESS VISIT, SUBSEQUENT: Status: RESOLVED | Noted: 2025-06-25 | Resolved: 2025-07-25

## 2025-07-31 NOTE — ASSESSMENT & PLAN NOTE
Lab Results   Component Value Date    HGBA1C 7.4 (H) 10/24/2024       Recent Labs     12/06/24  1911 12/06/24  2041 12/07/24  0751 12/07/24  1145   POCGLU 176* 255* 158* 194*       Blood Sugar Average: Last 72 hrs:  (P) [id number]  Continue to hold metformin and Januvia from home  SSI  Diabetic diet  Continue Lyrica for polyneuropathy.      What Type Of Note Output Would You Prefer (Optional)?: Standard Output How Severe Are Your Spot(S)?: mild Have Your Spot(S) Been Treated In The Past?: has not been treated Hpi Title: Evaluation of Skin Lesions

## 2025-08-05 NOTE — PROGRESS NOTES
PCP wants him to have an EEG done.  PM&R PROGRESS NOTE:  Lona Cedeno 78 y.o. female MRN: [id number]  Unit/Bed#: -01 Encounter: [id number]    Rehab Diagnosis: Impairment of mobility, safety and Activities of Daily Living (ADLs) due to Other Disabling Impairments:  13  Other Disabling Impairments     Etiologic: Diabetic foot ulcer with osteomyelitis s/p fem bypass, right foot wound and achilles debridement with 2nd toe partial amputation and now s/p STSG   Date of Onset: 2/26/24     Date of surgery: 1/31, 2/4, 2/14, 2/28     History of Present Illness:   Lona Cedeno is a 78 y.o. female with history of anxiety, closed fracture of the coccyx in May 2023, diabetes, GERD, hyperlipidemia, hypertension, IBS who presented to the [facility] on 1/23 from the podiatry office with a diabetic ulcer of the right toe and right ankle with exposed Achilles tendon.  She was stabbed have osteomyelitis of the right foot transferred to [facility] to reconsider vascularization and surgical options.  She is recommended to undergo an extra-anatomic bypass.  There was discussion about right transtibial amputation however decision was made for attempted limb salvage.  She underwent the above bypass on 1/31/2024 as well as on 2/4/2024 and underwent right toe and heel wound debridement with cycle and partial amputation of the toe and VAC placement.  Her course was complicated by arrest chest wall hematoma s/p evacuation on 2/4 with vascular.  On 2/13 the patient needed discharge back to acute care in the setting of fevers and leukocytosis from a potential infectious standpoint.  She was also given a discharge from the [facility] to acute care setting after podiatry wanted to take the patient back for split thickness skin grafting which occurred on 2/28 with Dr. Yeboah.  Patient was transition to oral antibiotics and wound vacs have been removed.  She remains nonweightbearing to the right lower extremity. The patient was evaluated by  the Rehabilitation team and deemed an appropriate candidate for comprehensive inpatient rehabilitation and admitted to the [facility] on 3/5/2024  2:03 PM    SUBJECTIVE: Patient seen and evaluated in room.  No acute events overnight.  Seen by podiatry and able to have some of the dressings changed by nursing and some by podiatry.  Nursing to reach out to podiatry for the orders to be placed.  Otherwise she is doing fairly well and for discharge next week.  Denies any fever, chills, nausea vomiting cough or shortness of breath, diarrhea or constipation.  Participating in therapy.  Discussed with nursing to have weekend nursing take pictures of the donor site on changes.    ASSESSMENT: Stable, progressing, dressing changes      PLAN:    Rehabilitation  Functional deficits: Self-care and mobility  Continue current rehabilitation plan of care to maximize function.    Functional update:   PT: Min to mod assist for transfers, supervision for bed mobility and able to hop 8 feet in parallel bars using moderate assist maintaining nonweightbearing  OT: Hygiene moderate assist, eating set up, bathing moderate assist, dressing moderate assist    Estimated Discharge: Estimated 3/14    DVT prophylaxis  On Xarelto     Pain  Acetaminophen 975 mg every 8 hours  Lyrica 200 mg 3 times daily  Robaxin 250 mg every 8 hours  Oxycodone 2.5-5 mg every 4 hours as needed     Bladder plan  Continent     Bowel plan  Continent  Last bowel movement on 3/8     Code Status  Level 3 DNAR/DNI      * Surgical site infection  Assessment & Plan  Needed treatment with cefepime for surgical site infection however also on vancomycin which was discontinued on 2/16  Right groin breakdown status post wound washout with VAC placement on 2/14 as well as 2/19  Was followed by infectious disease and continued on cefepime with plan to transition to cefpodoxime 400 mg twice daily for 4 weeks.  Cultures were positive for Klebsiella and Proteus  VAC to the right groin as  well as the foot have since been discontinued and continues for cefpodoxime for antibiotic treatment  Maintain nonweightbearing to the right lower extremity  Underwent split thickness skin grafting with podiatry on 2/28 for the right foot  Due to the surgical site infection, sepsis and requirement for multiple trips to the OR for wound debridement, VAC placement, split-thickness grafting patient is not at her functional baseline and would benefit from daily physician oversight and rehabilitation nursing as well as aggressive physical and occupational therapy under guidance of physical medicine and rehabilitation specialist.  Patient will tolerate this 3 hours/day 5 to 7 days/week with ultimate discharge goal for the community    Impulsive  Assessment & Plan  Had impulsive behaviors during prior admissions and discussed overall behavioral plan.  Patient on last admission was impulsive and not using call bell appropriately as well as not following weightbearing restrictions  Discussed with patient again however continued vigilance with nursing and therapy checks, alarms and relatively sensitive settings and would benefit from Q 2-hour bowel bladder checks    Sacral wound  Assessment & Plan  Unstageable wound on the sacrum on initial evaluations, POA  Consult wound care for management  Vigilant turns in bed and weight shifts, daily sacral checks by nursing    Acute pain due to injury  Assessment & Plan  Acetaminophen 975 mg every 8 hours  Lyrica 200 mg 3 times daily  Robaxin 250 mg every 8 hours  Oxycodone 2.5-5 mg every 4 hours as needed    Impaired mobility and activities of daily living  Assessment & Plan  - Rehabilitation medicine physician for daily monitoring of care, 24 hour availability for acute  medical issues, medication management, and therapeutic and diagnostic assessments.  - 24 hour rehabilitation nursing 7 days per week for: management/teaching of medications,  bowel/bladder routine, skin care.  - PT,  OT for 2-3 hours per day, 5 to 6 days per week; 15 hours per week  - Rehabilitation Psychology for adjustment and coping  - MSW for barriers to discharge, community resources, and family support  - Discharge planning following to help ensure a safe and efficient discharge    Anemia  Assessment & Plan  Most recent hemoglobin 11.6 which is just within the normal range but has been overall improving  Continue to follow biweekly labs or sooner if clinically indicated as patient is overall at risk for drop in hemoglobin  On review of the last 2 weeks of labs has improved slightly from the mid to upper 10s  Continue B12 supplementation    Diabetic foot ulcer with osteomyelitis (HCC)  Assessment & Plan  Patient presented with right posterior ankle ulceration with exposed Achilles tendon and required foot debridement on 2/4/2024 with VAC placement with right medial ankle ulcer, right second toe osteomyelitis status post right second toe amputation  Wound vacs have since been discontinued and continues to be followed by podiatry  Require going back to the OR on 2/28 for split-thickness grafting  Remains nonweightbearing to the right lower extremity      Tobacco use disorder  Assessment & Plan  Smoking cessation counseling  Had been on nicotine patch 21 mg / 24-hour, however decreased to 14 mg    COPD, severity to be determined ([facility])  Assessment & Plan  Continue inhalers per internal medicine including albuterol as needed as well as fluticasone-vilanterol 1 puff daily  Monitor oxygen saturations and therapy    PAD (peripheral artery disease) ([facility])  Assessment & Plan  Patient was on aspirin, statin and low-dose Xarelto 2.5 mg twice daily  Status post 1/31 bypass axillary-femoral, right with 8 mm ringed PTFE graft    GERD without esophagitis  Assessment & Plan  Continue Protonix    Anxiety and depression  Assessment & Plan  Continue Wellbutrin  mg daily as well as Klonopin  Supportive counseling and consider  neuropsychology if indicated and patient agreeable    Essential hypertension  Assessment & Plan  Monitor pressures in therapy however has been off of regular scheduled antihypertensives  Ordered hydralazine as needed for systolic blood pressure greater than 160    Type 2 diabetes mellitus with diabetic polyneuropathy (HCC)  Assessment & Plan  Lab Results   Component Value Date    HGBA1C 6.0 (H) 02/28/2024       Recent Labs     03/06/24  1604 03/06/24  2115 03/07/24  0652 03/07/24  1111   POCGLU 79 140 155* 91     Has been on metformin as an outpatient but held while inpatient and currently on a sliding scale regimen.  Monitor per internal medicine and make adjustments as needed  Glycemic control has been good we will need to continue to for appropriate wound healing potential          Appreciate IM consultants medical co-management.  Labs, medications, and imaging personally reviewed.      ROS:  A ten point review of systems was completed on 03/08/24 and pertinent positives are listed in subjective section. All other systems reviewed were negative.       OBJECTIVE:   /66 (BP Location: Left arm)   Pulse 75   Temp 97.7 °F (36.5 °C) (Oral)   Resp 18   Wt 69.5 kg (153 lb 3.5 oz)   SpO2 94%   BMI 28.95 kg/m²     Physical Exam  Vitals reviewed.   Constitutional:       General: She is not in acute distress.  HENT:      Head: Normocephalic and atraumatic.      Right Ear: External ear normal.      Left Ear: External ear normal.      Nose: Nose normal. No rhinorrhea.      Mouth/Throat:      Mouth: Mucous membranes are moist.      Pharynx: Oropharynx is clear.   Eyes:      General: No scleral icterus.  Cardiovascular:      Rate and Rhythm: Normal rate.   Pulmonary:      Effort: Pulmonary effort is normal. No respiratory distress.   Abdominal:      General: There is no distension.      Palpations: Abdomen is soft.   Musculoskeletal:      Cervical back: Normal range of motion.      Right lower leg: Edema present.       Left lower leg: No edema.      Comments: Ace wrap on the right lower extremity with Prevalon boot in place.  Swelling is more distal and very little in the proximal lower extremity to about MCFP down the tibia region   Skin:     General: Skin is warm and dry.   Neurological:      Mental Status: She is alert and oriented to person, place, and time.      Motor: No weakness.   Psychiatric:         Mood and Affect: Mood normal.         Behavior: Behavior normal.          Lab Results   Component Value Date    WBC 9.33 03/04/2024    HGB 11.6 03/04/2024    HCT 37.9 03/04/2024     (H) 03/04/2024     03/04/2024     Lab Results   Component Value Date    SODIUM 137 03/04/2024    K 4.1 03/04/2024     03/04/2024    CO2 30 03/04/2024    BUN 21 03/04/2024    CREATININE 0.47 (L) 03/04/2024    GLUC 161 (H) 03/04/2024    CALCIUM 9.3 03/04/2024     Lab Results   Component Value Date    INR 1.16 02/14/2024    INR 1.03 02/04/2024    INR 1.11 02/03/2024    PROTIME 14.7 (H) 02/14/2024    PROTIME 13.4 02/04/2024    PROTIME 14.2 02/03/2024           Current Facility-Administered Medications:     acetaminophen (TYLENOL) tablet 975 mg, 975 mg, Oral, Q8H JILLIAN, Vanesa Sheehan, CRNP, 975 mg at 03/08/24 1433    albuterol (PROVENTIL HFA,VENTOLIN HFA) inhaler 2 puff, 2 puff, Inhalation, Q6H PRN, Vanesa Sheehan, CRNP    aspirin chewable tablet 81 mg, 81 mg, Oral, Daily, Vanesa Sheehan, CRNP, 81 mg at 03/08/24 0826    buPROPion (WELLBUTRIN XL) 24 hr tablet 300 mg, 300 mg, Oral, Daily, Vanesa Sheehan, CRNP, 300 mg at 03/08/24 0829    cefpodoxime (VANTIN) tablet 400 mg, 400 mg, Oral, BID With Meals, Vanesa Sheehan, CRNP, 400 mg at 03/08/24 0830    clonazePAM (KlonoPIN) tablet 1 mg, 1 mg, Oral, QPM, Vanesa Sheehan, CRNP, 1 mg at 03/07/24 1706    cyanocobalamin (VITAMIN B-12) tablet 1,000 mcg, 1,000 mcg, Oral, Daily, Vanesa Sheehan, CRNP, 1,000 mcg at 03/08/24 0826    fish oil capsule 1,000 mg, 1,000 mg, Oral, Daily, Vanesa Sheehan, CRNP, 1,000 mg  at 03/08/24 0828    fluticasone-vilanterol 200-25 mcg/actuation 1 puff, 1 puff, Inhalation, Daily, Vanesa Sheehan, CRNP, 1 puff at 03/08/24 0830    hydrALAZINE (APRESOLINE) tablet 25 mg, 25 mg, Oral, Q8H PRN, Vanesa Sheehan, CRNP    insulin lispro (HumALOG/ADMELOG) 100 units/mL subcutaneous injection 1-5 Units, 1-5 Units, Subcutaneous, TID AC, 1 Units at 03/07/24 0849 **AND** Fingerstick Glucose (POCT), , , TID AC, Vanesa Sheehan, CRNP    insulin lispro (HumALOG/ADMELOG) 100 units/mL subcutaneous injection 1-5 Units, 1-5 Units, Subcutaneous, HS, Vanesa Sheehan, CRNP, 1 Units at 03/07/24 2146    metFORMIN (GLUCOPHAGE) tablet 500 mg, 500 mg, Oral, BID With Meals, Vanesa Sheehan, CRNP, 500 mg at 03/08/24 0826    methocarbamol (ROBAXIN) tablet 250 mg, 250 mg, Oral, Q8H JILLIAN, Vanesa Sheehan, CRNP, 250 mg at 03/08/24 1432    multivitamin-minerals (CENTRUM) tablet 1 tablet, 1 tablet, Oral, Daily, Vanesa Sheehan, CRNP, 1 tablet at 03/08/24 0825    nicotine (NICODERM CQ) 14 mg/24hr TD 24 hr patch 1 patch, 1 patch, Transdermal, Daily, Vanesa Sheehan, CRNP, 1 patch at 03/08/24 0830    oxyCODONE (ROXICODONE) split tablet 2.5 mg, 2.5 mg, Oral, Q4H PRN **OR** oxyCODONE (ROXICODONE) IR tablet 5 mg, 5 mg, Oral, Q4H PRN, Vanesa Sheehan, CRNP, 5 mg at 03/06/24 2014    pantoprazole (PROTONIX) EC tablet 40 mg, 40 mg, Oral, Early Morning, Vanesa Sheehan, CRNP, 40 mg at 03/08/24 0523    polyethylene glycol (MIRALAX) packet 17 g, 17 g, Oral, Daily, Vanesa Sheehan, CRNP, 17 g at 03/08/24 0824    pregabalin (LYRICA) capsule 200 mg, 200 mg, Oral, TID, Vanesa Sheehan, CRNP, 200 mg at 03/08/24 0826    rivaroxaban (XARELTO) tablet 2.5 mg, 2.5 mg, Oral, BID, Vanesa Sheehan, CRNP, 2.5 mg at 03/08/24 0831    senna-docusate sodium (SENOKOT S) 8.6-50 mg per tablet 1 tablet, 1 tablet, Oral, BID, Vanesa Sheehan, CRNP, 1 tablet at 03/08/24 0825    Past Medical History:   Diagnosis Date    Anxiety     Closed fracture of coccyx (HCC) 05/24/2023    Diabetes mellitus ([facility])     GERD  (gastroesophageal reflux disease)     Hyperlipidemia     Hypertension     IBS (irritable bowel syndrome)     Shoulder fracture        Patient Active Problem List    Diagnosis Date Noted    Surgical site infection 02/13/2024    Sacral wound 02/23/2024    Impulsive 02/23/2024    Other dietary vitamin B12 deficiency anemia 02/16/2024    Leukocytosis 02/13/2024    Sepsis without acute organ dysfunction (HCC) 02/13/2024    At risk for venous thromboembolism (VTE) 02/09/2024    At high risk for skin breakdown 02/09/2024    Wound of skin 02/09/2024    Impaired mobility and activities of daily living 02/09/2024    Acute pain due to injury 02/09/2024    Hematoma 02/06/2024    Anemia 02/06/2024    Constipation 01/26/2024    Preoperative cardiovascular examination 01/25/2024    Diabetic foot ulcer with osteomyelitis (HCC) 01/23/2024    Ankle ulcer, right, with fat layer exposed (HCC) 12/14/2023    Age-related osteoporosis without current pathological fracture 11/28/2023    Abnormal CT of the chest 10/17/2023    COPD, severity to be determined ([facility]) 10/17/2023    Tobacco use disorder 10/17/2023    PAD (peripheral artery disease) ([facility]) 10/10/2023    Bladder neoplasm 09/11/2023    Left renal mass 09/11/2023    Lactic acidosis 08/11/2023    Hypomagnesemia 08/11/2023    Degenerative lumbar disc 05/24/2023    Urinary incontinence 04/13/2023    GERD without esophagitis 10/16/2019    Essential hypertension 02/15/2019    Anxiety and depression 02/15/2019    Type 2 diabetes mellitus with diabetic polyneuropathy (HCC) 02/12/2019          Rigo Hoover, DO  Physical Medicine and Rehabilitation  [facility]    I have spent a total time of 35 minutes on 03/08/24 in caring for this patient including Counseling / Coordination of care, Documenting in the medical record, and Communicating with other healthcare professionals .      ** Please Note:  voice to text software may have been used in the creation of this document.  Although proof errors in transcription or interpretation are a potential of such software**

## (undated) DEVICE — GUIDEWIRE STRGHT TIP 0.035 IN  SOLO PLUS

## (undated) DEVICE — CURITY STRETCH BANDAGE: Brand: CURITY

## (undated) DEVICE — GLOVE SRG BIOGEL 7.5

## (undated) DEVICE — 3.2MM GUIDE WIRE 400MM

## (undated) DEVICE — LEGGINGS: Brand: CONVERTORS

## (undated) DEVICE — SPONGE STICK WITH PVP-I: Brand: KENDALL

## (undated) DEVICE — 4.2MM THREE-FLUTED DRILL BIT QC/330MM/100MM CALIB-STERILE

## (undated) DEVICE — INTENDED FOR TISSUE SEPARATION, AND OTHER PROCEDURES THAT REQUIRE A SHARP SURGICAL BLADE TO PUNCTURE OR CUT.: Brand: BARD-PARKER SAFETY BLADES SIZE 15, STERILE

## (undated) DEVICE — CHLORAPREP HI-LITE 26ML ORANGE

## (undated) DEVICE — 40601 PROLONGED POSITIONING SYSTEM: Brand: 40601 PROLONGED POSITIONING SYSTEM

## (undated) DEVICE — HANDPIECE SET WITH RETRACTABLE COAXIAL FAN SPRAY TIP AND SUCTION TUBE: Brand: INTERPULSE

## (undated) DEVICE — TRAY FOLEY 16FR URIMETER SURESTEP

## (undated) DEVICE — SUT SILK 0 30 IN A306H

## (undated) DEVICE — 4-PORT MANIFOLD: Brand: NEPTUNE 2

## (undated) DEVICE — INTENDED FOR TISSUE SEPARATION, AND OTHER PROCEDURES THAT REQUIRE A SHARP SURGICAL BLADE TO PUNCTURE OR CUT.: Brand: BARD-PARKER ® CARBON RIB-BACK BLADES

## (undated) DEVICE — SYRINGE 20ML LL

## (undated) DEVICE — INTENDED FOR TISSUE SEPARATION, AND OTHER PROCEDURES THAT REQUIRE A SHARP SURGICAL BLADE TO PUNCTURE OR CUT.: Brand: BARD-PARKER SAFETY BLADES SIZE 10, STERILE

## (undated) DEVICE — CAST PADDING 4 IN UNSTERILE

## (undated) DEVICE — CURITY NON-ADHERENT STRIPS: Brand: CURITY

## (undated) DEVICE — BAG DRAINAGE UROCATCHER 4 SKYTRON

## (undated) DEVICE — COTTON BALLS: Brand: DEROYAL

## (undated) DEVICE — 3M™ TEGADERM™ TRANSPARENT FILM DRESSING FRAME STYLE, 1628, 6 IN X 8 IN (15 CM X 20 CM), 10/CT 8CT/CASE: Brand: 3M™ TEGADERM™

## (undated) DEVICE — PACK UNIVERSAL DRAPES SUB-Q ICD

## (undated) DEVICE — GLOVE INDICATOR PI UNDERGLOVE SZ 7.5 BLUE

## (undated) DEVICE — GAUZE SPONGES,16 PLY: Brand: CURITY

## (undated) DEVICE — MEDI-VAC NON-CONDUCTIVE SUCTION TUBING 6MM X 1.8M (6FT.) L: Brand: CARDINAL HEALTH

## (undated) DEVICE — PREVENA PEEL & PLACE SYSTEM KIT- 13 CM: Brand: PREVENA™ PEEL & PLACE™

## (undated) DEVICE — BASIC SINGLE BASIN 2-LF: Brand: MEDLINE INDUSTRIES, INC.

## (undated) DEVICE — DRAPE C-ARMOUR

## (undated) DEVICE — SINGLE-USE DIGITAL FLEXIBLE URETEROSCOPE: Brand: APTRA

## (undated) DEVICE — 3M™ V.A.C.® GRANUFOAM™ DRESSING KIT, M8275052, MEDIUM: Brand: 3M™ V.A.C.® GRANUFOAM™

## (undated) DEVICE — BETHLEHEM UNIVERSAL MINOR GEN: Brand: CARDINAL HEALTH

## (undated) DEVICE — ABDOMINAL PAD: Brand: DERMACEA

## (undated) DEVICE — DRAPE C-ARM X-RAY

## (undated) DEVICE — STERILE BETHLEHEM FEM POP PACK: Brand: CARDINAL HEALTH

## (undated) DEVICE — SKN PRP WNG SPNGE PVP SCRB STR: Brand: MEDLINE INDUSTRIES, INC.

## (undated) DEVICE — DRESSING MELGISORB AG 4 X 4IN

## (undated) DEVICE — PENCIL ELECTROSURG E-Z CLEAN -0035H

## (undated) DEVICE — PACK CUSTOM GU CYSTO PACK RF

## (undated) DEVICE — CAST PADDING 4 IN SYNTHETIC STRL

## (undated) DEVICE — SURGICEL FIBRILLAR 1 X 2

## (undated) DEVICE — PROXIMATE PLUS MD MULTI-DIRECTIONAL RELEASE SKIN STAPLERS CONTAINS 35 STAINLESS STEEL STAPLES APPROXIMATE CLOSED DIMENSIONS: 6.9MM X 3.9MM WIDE: Brand: PROXIMATE

## (undated) DEVICE — CYSTO TUBING SINGLE IRRIGATION

## (undated) DEVICE — NEEDLE 25G X 1 1/2

## (undated) DEVICE — MEDI-VAC YANK SUCT HNDL W/TPRD BULBOUS TIP: Brand: CARDINAL HEALTH

## (undated) DEVICE — PLUMEPEN PRO 10FT

## (undated) DEVICE — GLOVE PI ULTRA TOUCH SZ.7.5

## (undated) DEVICE — TIBURON SPLIT SHEET: Brand: CONVERTORS

## (undated) DEVICE — PAD GROUNDING DUAL ADULT

## (undated) DEVICE — SUT VICRYL 0 REEL 54 IN J287G

## (undated) DEVICE — SUT PROLENE 6-0 BV-1/BV-1 24 IN 8805H

## (undated) DEVICE — 2108 SERIES SAGITTAL BLADE (18.6 X 0.64 X 61.1MM)

## (undated) DEVICE — KERLIX BANDAGE ROLL: Brand: KERLIX

## (undated) DEVICE — PAD GROUNDING ADULT

## (undated) DEVICE — ZIMMER SKIN GRAFT CARRIER 8 INCH LENGTH: Brand: DERMACARRIERS

## (undated) DEVICE — STOCKINETTE REGULAR

## (undated) DEVICE — 3M™ STERI-DRAPE™ U-DRAPE 1015: Brand: STERI-DRAPE™

## (undated) DEVICE — 450 ML BOTTLE OF 0.05% CHLORHEXIDINE GLUCONATE IN 99.95% STERILE WATER FOR IRRIGATION, USP AND APPLICATOR.: Brand: IRRISEPT ANTIMICROBIAL WOUND LAVAGE

## (undated) DEVICE — SUT MONOCRYL 4-0 PS-2 27 IN Y426H

## (undated) DEVICE — DERMATOME BLADES: Brand: DERMATOME

## (undated) DEVICE — DRAPE SHEET THREE QUARTER

## (undated) DEVICE — TUBING SUCTION 5MM X 12 FT

## (undated) DEVICE — 3M™ IOBAN™ 2 ANTIMICROBIAL INCISE DRAPE 6651EZ: Brand: IOBAN™ 2

## (undated) DEVICE — FIBER HOLMIUM 272UM SNGL USE

## (undated) DEVICE — GLOVE INDICATOR PI UNDERGLOVE SZ 8 BLUE

## (undated) DEVICE — BETHLEHEM UNIV MAJ EXT ,KIT: Brand: CARDINAL HEALTH

## (undated) DEVICE — SUT SILK 2-0 18 IN A185H

## (undated) DEVICE — DRAPE SURGIKIT SADDLE BAG

## (undated) DEVICE — DECANTER: Brand: UNBRANDED

## (undated) DEVICE — VAC CANISTER 500ML

## (undated) DEVICE — 1840 FOAM BLOCK NEEDLE COUNTER: Brand: DEVON

## (undated) DEVICE — FIRST STEP BEDSIDE KIT - STAND-UP POUCH, ENDOSCOPIC CLEANING PAD - 1 POUCH: Brand: FIRST STEP BEDSIDE KIT - STAND-UP POUCH, ENDOSCOPIC CLEANING PAD

## (undated) DEVICE — ACE WRAP 4 IN UNSTERILE

## (undated) DEVICE — SUT MONOCRYL 2-0 CT-1 27 IN Y339H

## (undated) DEVICE — URETEROSCOPIC BIOPSY FORCEPS: Brand: PIRANHA

## (undated) DEVICE — ACE WRAP 6 IN UNSTERILE

## (undated) DEVICE — SUT SILK 2-0 SH 30 IN K833H

## (undated) DEVICE — BAG URINE DRAINAGE 2000ML ANTI RFLX LF

## (undated) DEVICE — SPECIMEN CONTAINER STERILE PEEL PACK

## (undated) DEVICE — ADHESIVE SKIN HIGH VISCOSITY EXOFIN 1ML

## (undated) DEVICE — DRESSING MEPILEX AG BORDER POST-OP 4 X 6 IN

## (undated) DEVICE — PROXIMATE SKIN STAPLERS (35 WIDE) CONTAINS 35 STAINLESS STEEL STAPLES (FIXED HEAD): Brand: PROXIMATE

## (undated) DEVICE — 3M™ STERI-STRIP™ REINFORCED ADHESIVE SKIN CLOSURES, R1547, 1/2 IN X 4 IN (12 MM X 100 MM), 6 STRIPS/ENVELOPE: Brand: 3M™ STERI-STRIP™

## (undated) DEVICE — GUIDEWIRE STRGHT TIP 0.038 IN SOLO PLUS

## (undated) DEVICE — SUT PROLENE 5-0 BV-1 24 IN 9702H

## (undated) DEVICE — SUT VICRYL 0 CT-1 CR/8 27IN JJ31G

## (undated) DEVICE — DRESSING XEROFORM 5 X 9

## (undated) DEVICE — STERILE SURGICAL LUBRICANT,  TUBE: Brand: SURGILUBE

## (undated) DEVICE — Device

## (undated) DEVICE — 3M™ STERI-DRAPE™  ISOLATION DRAPE WITH INCISE FILM AND POUCH 1017: Brand: STERI-DRAPE™

## (undated) DEVICE — SUT VICRYL 0 CT-1 27 IN J260H

## (undated) DEVICE — POV-IOD SOLUTION 4OZ BT

## (undated) DEVICE — SUT MONOCRYL 3-0 SH 27 IN Y416H

## (undated) DEVICE — SUT ETHILON 3-0 FS-1 18 IN 663G

## (undated) DEVICE — DRESSING MEPILEX AG BORDER POST-OP 4 X 8 IN

## (undated) DEVICE — TONGUE DEPRESSOR STERILE

## (undated) DEVICE — C-ARM: Brand: UNBRANDED

## (undated) DEVICE — TELFA NON-ADHERENT ABSORBENT DRESSING: Brand: TELFA

## (undated) DEVICE — SUT VICRYL 2-0 CT-1 27 IN J259H

## (undated) DEVICE — PETRI DISH STERILE

## (undated) DEVICE — TRAY FOLEY 16FR URIMETER SILICONE SURESTEP

## (undated) DEVICE — PUMPING SYSTEM SINGLE ACTION STD

## (undated) DEVICE — SPONGE LAP 18 X 18 IN STRL RFD

## (undated) DEVICE — CATH URETERAL OPEN END 6FR X 70CM

## (undated) DEVICE — SHEATH URETERAL ACCESS 12/14FR 35CM PROXIS

## (undated) DEVICE — SUT SILK 4-0 18 IN A183H

## (undated) DEVICE — BULB SYRINGE,IRRIGATION WITH PROTECTIVE CAP: Brand: DOVER

## (undated) DEVICE — 2.5MM REAMING ROD WITH BALL TIP/950MM-STERILE
Type: IMPLANTABLE DEVICE | Site: LEG | Status: NON-FUNCTIONAL
Removed: 2024-12-06